# Patient Record
Sex: FEMALE | Race: WHITE | NOT HISPANIC OR LATINO | Employment: OTHER | URBAN - METROPOLITAN AREA
[De-identification: names, ages, dates, MRNs, and addresses within clinical notes are randomized per-mention and may not be internally consistent; named-entity substitution may affect disease eponyms.]

---

## 2017-01-04 ENCOUNTER — ANTI-COAG VISIT (OUTPATIENT)
Dept: CARDIOLOGY | Facility: CLINIC | Age: 82
End: 2017-01-04
Payer: MEDICARE

## 2017-01-04 DIAGNOSIS — I48.20 CHRONIC ATRIAL FIBRILLATION: ICD-10-CM

## 2017-01-04 DIAGNOSIS — Z79.01 LONG TERM (CURRENT) USE OF ANTICOAGULANTS: Primary | ICD-10-CM

## 2017-01-04 LAB
CTP QC/QA: NORMAL
INR PPP: 2.3 (ref 2–3)

## 2017-01-04 PROCEDURE — 85610 PROTHROMBIN TIME: CPT | Mod: QW,S$GLB,, | Performed by: PHARMACIST

## 2017-01-04 NOTE — PROGRESS NOTES
Patient denies any changes in diet, medications, or health that would effect warfarin therapy.  Patient was re-educated on situations that would require placing a call to the coumadin clinic, including bleeding or unusual bruising issues, changes in health, diet or medications, upcoming procedures that require warfarin interruption, and missed coumadin dose(s). Patient expressed understanding.

## 2017-01-05 ENCOUNTER — CLINICAL SUPPORT (OUTPATIENT)
Dept: INTERNAL MEDICINE | Facility: CLINIC | Age: 82
End: 2017-01-05
Payer: MEDICARE

## 2017-01-05 DIAGNOSIS — J30.81 NON-SEASONAL ALLERGIC RHINITIS DUE TO ANIMAL HAIR AND DANDER: ICD-10-CM

## 2017-01-05 DIAGNOSIS — J30.1 SEASONAL ALLERGIC RHINITIS DUE TO POLLEN: ICD-10-CM

## 2017-01-05 PROCEDURE — 95115 IMMUNOTHERAPY ONE INJECTION: CPT | Mod: S$GLB,,, | Performed by: INTERNAL MEDICINE

## 2017-01-05 NOTE — PROGRESS NOTES
Patient came in for her monthly allergy injection. 0.5ml of vial #1, RED, given to the upper right posterior arm.  Patient tolerated injection well with no reaction noted or verbalized.

## 2017-01-24 ENCOUNTER — OFFICE VISIT (OUTPATIENT)
Dept: INTERNAL MEDICINE | Facility: CLINIC | Age: 82
End: 2017-01-24
Payer: MEDICARE

## 2017-01-24 ENCOUNTER — TELEPHONE (OUTPATIENT)
Dept: INTERNAL MEDICINE | Facility: CLINIC | Age: 82
End: 2017-01-24

## 2017-01-24 ENCOUNTER — HOSPITAL ENCOUNTER (OUTPATIENT)
Dept: RADIOLOGY | Facility: HOSPITAL | Age: 82
Discharge: HOME | End: 2017-01-24
Attending: INTERNAL MEDICINE
Payer: MEDICARE

## 2017-01-24 VITALS
TEMPERATURE: 99 F | DIASTOLIC BLOOD PRESSURE: 64 MMHG | SYSTOLIC BLOOD PRESSURE: 136 MMHG | BODY MASS INDEX: 33.02 KG/M2 | HEART RATE: 75 BPM | WEIGHT: 204.56 LBS | RESPIRATION RATE: 20 BRPM

## 2017-01-24 DIAGNOSIS — I70.0 ATHEROSCLEROSIS OF AORTA: ICD-10-CM

## 2017-01-24 DIAGNOSIS — R06.09 DYSPNEA ON EXERTION: Primary | ICD-10-CM

## 2017-01-24 DIAGNOSIS — I51.89 LEFT VENTRICULAR DIASTOLIC DYSFUNCTION WITH PRESERVED SYSTOLIC FUNCTION: ICD-10-CM

## 2017-01-24 DIAGNOSIS — R06.09 DYSPNEA ON EXERTION: ICD-10-CM

## 2017-01-24 DIAGNOSIS — I50.32 CHRONIC DIASTOLIC HEART FAILURE: ICD-10-CM

## 2017-01-24 DIAGNOSIS — I48.20 CHRONIC ATRIAL FIBRILLATION: ICD-10-CM

## 2017-01-24 PROCEDURE — 3078F DIAST BP <80 MM HG: CPT | Mod: S$GLB,,, | Performed by: INTERNAL MEDICINE

## 2017-01-24 PROCEDURE — 3075F SYST BP GE 130 - 139MM HG: CPT | Mod: S$GLB,,, | Performed by: INTERNAL MEDICINE

## 2017-01-24 PROCEDURE — 71020 XR CHEST PA AND LATERAL: CPT | Mod: 26,,, | Performed by: RADIOLOGY

## 2017-01-24 PROCEDURE — 99214 OFFICE O/P EST MOD 30 MIN: CPT | Mod: S$GLB,,, | Performed by: INTERNAL MEDICINE

## 2017-01-24 PROCEDURE — 99999 PR PBB SHADOW E&M-EST. PATIENT-LVL III: CPT | Mod: PBBFAC,,, | Performed by: INTERNAL MEDICINE

## 2017-01-24 PROCEDURE — 1157F ADVNC CARE PLAN IN RCRD: CPT | Mod: S$GLB,,, | Performed by: INTERNAL MEDICINE

## 2017-01-24 PROCEDURE — 93010 ELECTROCARDIOGRAM REPORT: CPT | Mod: S$GLB,,, | Performed by: INTERNAL MEDICINE

## 2017-01-24 PROCEDURE — 1160F RVW MEDS BY RX/DR IN RCRD: CPT | Mod: S$GLB,,, | Performed by: INTERNAL MEDICINE

## 2017-01-24 PROCEDURE — 99499 UNLISTED E&M SERVICE: CPT | Mod: S$GLB,,, | Performed by: INTERNAL MEDICINE

## 2017-01-24 PROCEDURE — 93005 ELECTROCARDIOGRAM TRACING: CPT | Mod: S$GLB,,, | Performed by: INTERNAL MEDICINE

## 2017-01-24 PROCEDURE — 71020 XR CHEST PA AND LATERAL: CPT | Mod: TC,PO

## 2017-01-24 PROCEDURE — 1159F MED LIST DOCD IN RCRD: CPT | Mod: S$GLB,,, | Performed by: INTERNAL MEDICINE

## 2017-01-24 NOTE — PROGRESS NOTES
Subjective:       Patient ID: Jenniffer Jimenez is a 84 y.o. female.    Chief Complaint: Cough; Chest Pain; and Shortness of Breath    Patient is a 84 y.o.female with atherosclerosis of aorta, chronic diastolic heart failure and chronic atrial fibrillation who presents today for cough and congestion.    From christmas to January she was eating salty foods and drinking two glasses of wine nightly. Since then, she has developed some shortness of breath ; mostly on exertion. She admits to a cough as well. The cough is productive with phlegm. No fever or chills. She complains of left sided chest pain; usually after eating. It lasts a few seconds and after belching it resolves.     Review of Systems   Constitutional: Negative for appetite change, chills, diaphoresis, fatigue and fever.   HENT: Negative for congestion, dental problem, ear discharge, ear pain, hearing loss, postnasal drip, sinus pressure and sore throat.    Eyes: Negative for discharge, redness and itching.   Respiratory: Positive for cough and shortness of breath. Negative for chest tightness and wheezing.    Cardiovascular: Negative for chest pain, palpitations and leg swelling.   Gastrointestinal: Negative for abdominal pain, constipation, diarrhea, nausea and vomiting.   Endocrine: Negative for cold intolerance and heat intolerance.   Genitourinary: Negative for difficulty urinating, frequency, hematuria and urgency.   Musculoskeletal: Negative for arthralgias, back pain, gait problem, myalgias and neck pain.   Skin: Negative for color change and rash.   Neurological: Negative for dizziness, syncope and headaches.   Hematological: Negative for adenopathy.   Psychiatric/Behavioral: Negative for behavioral problems and sleep disturbance. The patient is not nervous/anxious.        Objective:      Physical Exam   Constitutional: She is oriented to person, place, and time. She appears well-developed and well-nourished. No distress.   HENT:   Head:  Normocephalic and atraumatic.   Right Ear: Tympanic membrane and external ear normal.   Left Ear: Tympanic membrane and external ear normal.   Nose: Mucosal edema and rhinorrhea present.   Mouth/Throat: Uvula is midline, oropharynx is clear and moist and mucous membranes are normal. No oropharyngeal exudate, posterior oropharyngeal edema, posterior oropharyngeal erythema or tonsillar abscesses.   Eyes: Conjunctivae and EOM are normal. Pupils are equal, round, and reactive to light. Right eye exhibits no discharge. Left eye exhibits no discharge. No scleral icterus.   Neck: Normal range of motion. Neck supple. No JVD present. No thyromegaly present.   Cardiovascular: Normal rate, regular rhythm, normal heart sounds and intact distal pulses.  Exam reveals no gallop and no friction rub.    No murmur heard.  Pulmonary/Chest: Effort normal and breath sounds normal. No stridor. No respiratory distress. She has no wheezes. She has no rales. She exhibits no tenderness.   Abdominal: Soft. Bowel sounds are normal. She exhibits no distension. There is no tenderness. There is no rebound.   Musculoskeletal: Normal range of motion. She exhibits no edema or tenderness.   Lymphadenopathy:     She has no cervical adenopathy.   2+ pitting edema bilateral lower extremities   Neurological: She is alert and oriented to person, place, and time.   Skin: Skin is warm. No rash noted. She is not diaphoretic. No erythema.   Psychiatric: She has a normal mood and affect. Her behavior is normal.   Nursing note and vitals reviewed.      Assessment and Plan:       1. Dyspnea on exertion  - rule out CHF exacerbation vs pneumonia vs cardiac etiology  - EKG 12-lead  - X-Ray Chest PA And Lateral; Future  - CBC auto differential; Future  - Brain natriuretic peptide; Future  - Basic metabolic panel; Future    2. Atherosclerosis of aorta  - stable on coumadin and statin    3. Chronic atrial fibrillation  - stable on coumadin and lasix    4. Chronic  diastolic heart failure  - taking lasix daily; check xray today    5. Left ventricular diastolic dysfunction with preserved systolic function  - taking lasix daily; check chest xray today    Notify clinic if symptoms change, worsen or do not improve        Return in about 4 months (around 5/24/2017).

## 2017-01-25 ENCOUNTER — ANTI-COAG VISIT (OUTPATIENT)
Dept: CARDIOLOGY | Facility: CLINIC | Age: 82
End: 2017-01-25
Payer: MEDICARE

## 2017-01-25 DIAGNOSIS — I48.20 CHRONIC ATRIAL FIBRILLATION: ICD-10-CM

## 2017-01-25 DIAGNOSIS — Z79.01 LONG TERM (CURRENT) USE OF ANTICOAGULANTS: Primary | ICD-10-CM

## 2017-01-25 LAB
CTP QC/QA: YES
INR PPP: 2 (ref 2–3)

## 2017-01-25 PROCEDURE — 85610 PROTHROMBIN TIME: CPT | Mod: QW,S$GLB,,

## 2017-01-25 PROCEDURE — 99211 OFF/OP EST MAY X REQ PHY/QHP: CPT | Mod: 25,S$GLB,,

## 2017-01-25 NOTE — PROGRESS NOTES
Patient missed a dose this past week since she accidentally dropped it on the floor. Despite this missed dose her INR remained within range. She will continue this dose until follow-up. I advised her to contact us with any changes or problems.

## 2017-02-01 ENCOUNTER — TELEPHONE (OUTPATIENT)
Dept: INTERNAL MEDICINE | Facility: CLINIC | Age: 82
End: 2017-02-01

## 2017-02-01 NOTE — TELEPHONE ENCOUNTER
----- Message from Vania Cesar sent at 2/1/2017  9:10 AM CST -----  Contact: self   Pt will like a copy of her EKG and lab results. Like come on Thursday to pickup copy       Please advise

## 2017-02-02 ENCOUNTER — CLINICAL SUPPORT (OUTPATIENT)
Dept: INTERNAL MEDICINE | Facility: CLINIC | Age: 82
End: 2017-02-02
Payer: MEDICARE

## 2017-02-02 DIAGNOSIS — J30.81 NON-SEASONAL ALLERGIC RHINITIS DUE TO ANIMAL HAIR AND DANDER: ICD-10-CM

## 2017-02-02 DIAGNOSIS — J30.1 SEASONAL ALLERGIC RHINITIS DUE TO POLLEN: ICD-10-CM

## 2017-02-02 PROCEDURE — 95115 IMMUNOTHERAPY ONE INJECTION: CPT | Mod: S$GLB,,, | Performed by: INTERNAL MEDICINE

## 2017-02-02 NOTE — PROGRESS NOTES
Patient came in for her monthly allergy injection. 0.5ml of vial #1, RED, given to the upper left posterior arm.  Patient had a 1+ local reaction noted to injection site.

## 2017-02-16 ENCOUNTER — PATIENT MESSAGE (OUTPATIENT)
Dept: CARDIOLOGY | Facility: CLINIC | Age: 82
End: 2017-02-16

## 2017-02-22 ENCOUNTER — ANTI-COAG VISIT (OUTPATIENT)
Dept: CARDIOLOGY | Facility: CLINIC | Age: 82
End: 2017-02-22
Payer: MEDICARE

## 2017-02-22 DIAGNOSIS — Z79.01 LONG TERM (CURRENT) USE OF ANTICOAGULANTS: Primary | ICD-10-CM

## 2017-02-22 DIAGNOSIS — I48.20 CHRONIC ATRIAL FIBRILLATION: ICD-10-CM

## 2017-02-22 LAB — INR PPP: 2.2 (ref 2–3)

## 2017-02-22 PROCEDURE — 99211 OFF/OP EST MAY X REQ PHY/QHP: CPT | Mod: 25,S$GLB,,

## 2017-02-22 PROCEDURE — 85610 PROTHROMBIN TIME: CPT | Mod: QW,S$GLB,,

## 2017-02-22 NOTE — PROGRESS NOTES
Patient will stop salads every other day and supplement the salads for [employer] nutritional drink. This change in diet can affect warfarin. She will begin in one week therefore we will follow-up two weeks later to ensure she is meeting her target goal with this change in diet. I advised her to contact us with any changes or problems.

## 2017-03-02 ENCOUNTER — CLINICAL SUPPORT (OUTPATIENT)
Dept: INTERNAL MEDICINE | Facility: CLINIC | Age: 82
End: 2017-03-02
Payer: MEDICARE

## 2017-03-02 DIAGNOSIS — J30.1 SEASONAL ALLERGIC RHINITIS DUE TO POLLEN: ICD-10-CM

## 2017-03-02 DIAGNOSIS — J30.81 NON-SEASONAL ALLERGIC RHINITIS DUE TO ANIMAL HAIR AND DANDER: ICD-10-CM

## 2017-03-02 PROCEDURE — 95115 IMMUNOTHERAPY ONE INJECTION: CPT | Mod: S$GLB,,, | Performed by: INTERNAL MEDICINE

## 2017-03-02 NOTE — PROGRESS NOTES
Patient came in for her monthly allergy injection. 0.5ml of vial #1 given to the upper left posterior arm.  Patient had a 1+ local reaction noted to injection site.

## 2017-03-06 ENCOUNTER — OFFICE VISIT (OUTPATIENT)
Dept: OTOLARYNGOLOGY | Facility: CLINIC | Age: 82
End: 2017-03-06
Payer: MEDICARE

## 2017-03-06 ENCOUNTER — CLINICAL SUPPORT (OUTPATIENT)
Dept: AUDIOLOGY | Facility: CLINIC | Age: 82
End: 2017-03-06
Payer: MEDICARE

## 2017-03-06 VITALS — WEIGHT: 205 LBS | BODY MASS INDEX: 32.95 KG/M2 | HEIGHT: 66 IN

## 2017-03-06 DIAGNOSIS — H90.3 SENSORY HEARING LOSS, BILATERAL: Primary | ICD-10-CM

## 2017-03-06 DIAGNOSIS — H60.8X1 CHRONIC ECZEMATOUS OTITIS EXTERNA OF RIGHT EAR: ICD-10-CM

## 2017-03-06 PROCEDURE — 99999 PR PBB SHADOW E&M-EST. PATIENT-LVL III: CPT | Mod: PBBFAC,,, | Performed by: OTOLARYNGOLOGY

## 2017-03-06 PROCEDURE — 92567 TYMPANOMETRY: CPT | Mod: S$GLB,,, | Performed by: AUDIOLOGIST

## 2017-03-06 PROCEDURE — 1159F MED LIST DOCD IN RCRD: CPT | Mod: S$GLB,,, | Performed by: OTOLARYNGOLOGY

## 2017-03-06 PROCEDURE — 1157F ADVNC CARE PLAN IN RCRD: CPT | Mod: S$GLB,,, | Performed by: OTOLARYNGOLOGY

## 2017-03-06 PROCEDURE — 99213 OFFICE O/P EST LOW 20 MIN: CPT | Mod: S$GLB,,, | Performed by: OTOLARYNGOLOGY

## 2017-03-06 PROCEDURE — 1160F RVW MEDS BY RX/DR IN RCRD: CPT | Mod: S$GLB,,, | Performed by: OTOLARYNGOLOGY

## 2017-03-06 PROCEDURE — 92557 COMPREHENSIVE HEARING TEST: CPT | Mod: S$GLB,,, | Performed by: AUDIOLOGIST

## 2017-03-06 PROCEDURE — 1126F AMNT PAIN NOTED NONE PRSNT: CPT | Mod: S$GLB,,, | Performed by: OTOLARYNGOLOGY

## 2017-03-06 RX ORDER — BETAMETHASONE VALERATE 0.1 %
LOTION (ML) TOPICAL 2 TIMES DAILY
Qty: 60 ML | Refills: 2 | Status: SHIPPED | OUTPATIENT
Start: 2017-03-06 | End: 2018-04-06

## 2017-03-06 NOTE — PROGRESS NOTES
Subjective:       Patient ID: Jenniffer Jimenez is a 85 y.o. female.    Chief Complaint: itchy ears    HPI: Hx of MORRO SMITH.    S/P EMS .    Now with R ear itching.    Past Medical History: Patient has a past medical history of Amblyopia of left eye (4/10/2013); Anxiety; Arthritis; Central retinal vein occlusion of left eye; Depression; Diastolic heart failure (2014); Exotropia of both eyes (2013); History of resection of small bowel; Hyperlipidemia; Hypertension; Hypertensive retinopathy of both eyes; Hypoglycemia; Macular degeneration; Meniere's disease of both ears; Other and unspecified hyperlipidemia; and Posterior vitreous detachment of both eyes.    Past Surgical History: Patient has a past surgical history that includes Cardiac catheterization; Inner ear surgery; Sinus surgery; Tonsillectomy;  section, classic; Appendectomy; Strabismus surgery (13); Strabismus surgery (2014); and Cataract extraction w/  intraocular lens implant (Bilateral).    Social History: Patient reports that she quit smoking about 34 years ago. Her smoking use included Cigarettes. She has quit using smokeless tobacco. She reports that she drinks alcohol. She reports that she does not use illicit drugs.    Family History: family history includes Diabetes in her brother and sister; Heart disease in her sister and sister; Hypertension in her father and mother; Liver disease in her sister; No Known Problems in her daughter, maternal aunt, maternal grandfather, maternal grandmother, maternal uncle, paternal aunt, paternal grandfather, paternal grandmother, paternal uncle, son, son, and son. There is no history of Cancer, Melanoma, Psoriasis, Lupus, Amblyopia, Blindness, Cataracts, Glaucoma, Macular degeneration, Retinal detachment, Strabismus, Stroke, or Thyroid disease.    Medications:   Current Outpatient Prescriptions   Medication Sig    aspirin (ECOTRIN) 81 MG EC tablet Take 81 mg by mouth once daily.     clindamycin (CLEOCIN T) 1 % lotion Use hs on face    fluticasone (FLONASE) 50 mcg/actuation nasal spray 1 spray by Each Nare route once daily.    furosemide (LASIX) 20 MG tablet Take 1 tablet (20 mg total) by mouth once daily.    glucosamine-chondroitin 500-400 mg tablet Take 1 tablet by mouth every morning.    peg 400-propylene glycol (SYSTANE) 0.4-0.3 % Drop Apply to eye daily as needed.     potassium chloride (MICRO-K) 10 MEQ CpSR Take 1 capsule (10 mEq total) by mouth once daily.    pravastatin (PRAVACHOL) 40 MG tablet Take 1 tablet (40 mg total) by mouth 2 (two) times daily. (Patient taking differently: Take 40 mg by mouth once daily. )    vitamin D 1000 units Tab Take 2,000 Units by mouth once daily.     warfarin (COUMADIN) 3 MG tablet Take 1 tablet (3 mg total) by mouth Daily.    betamethasone valerate 0.1% (VALISONE) 0.1 % Lotn Apply topically 2 (two) times daily. qtts 3 AD Bid.     No current facility-administered medications for this visit.        Allergies: Patient is allergic to bactrim [sulfamethoxazole-trimethoprim]; dramamine [dimenhydrinate]; tramadol; beta-blockers (beta-adrenergic blocking agts); and phenergan [promethazine].      Review of Systems   Constitutional: Negative.    HENT: Positive for ear discharge, ear pain and hearing loss. Negative for tinnitus.    Neurological: Negative for dizziness, seizures, syncope, facial asymmetry, speech difficulty, weakness, light-headedness and headaches.       Objective:      Physical Exam   Constitutional: She appears well-developed and well-nourished. No distress.   HENT:   Right Ear: External ear normal. No lacerations. No drainage, swelling or tenderness. No foreign bodies. No mastoid tenderness. Tympanic membrane is not injected, not scarred, not perforated, not erythematous, not retracted and not bulging. Tympanic membrane mobility is normal. No middle ear effusion. No hemotympanum. Decreased hearing is noted.   Left Ear: External ear  normal. No lacerations. No drainage, swelling or tenderness. No foreign bodies. No mastoid tenderness. Tympanic membrane is not injected, not scarred, not perforated, not erythematous, not retracted and not bulging. Tympanic membrane mobility is normal.  No middle ear effusion. No hemotympanum. Decreased hearing is noted.       R LISSY.       Eyes: Right eye exhibits normal extraocular motion and no nystagmus. Left eye exhibits normal extraocular motion and no nystagmus.   Neurological: She has normal strength. No cranial nerve deficit or sensory deficit. She displays a negative Romberg sign. Coordination and gait normal.   Skin: She is not diaphoretic.               Assessment:       1. Asymmetrical hearing loss, bilateral    2. Chronic eczematous otitis externa of right ear        Plan:         Jenniffer was seen today for itchy ears.    Diagnoses and all orders for this visit:    Asymmetrical hearing loss, bilateral  -     Ambulatory referral to Audiology    Chronic eczematous otitis externa of right ear  -     Ambulatory referral to Audiology    Other orders  -     betamethasone valerate 0.1% (VALISONE) 0.1 % Lotn; Apply topically 2 (two) times daily. qtts 3 AD Bid.        Patient to see Audiology for hearing aid evaluation.

## 2017-03-06 NOTE — MR AVS SNAPSHOT
[facility]  [address]  [place] [address]  Phone: [phone]  Fax: [phone]                  Jenniffer Jimenez   3/6/2017 2:00 PM   Office Visit    Description:  Female : 1932   Provider:  Celestino Lehman MD   Department:  [facility]           Reason for Visit     itchy ears           Diagnoses this Visit        Comments    Asymmetrical hearing loss, bilateral    -  Primary     Chronic eczematous otitis externa of right ear                To Do List           Future Appointments        Provider Department Dept Phone    3/15/2017 1:15 PM Denisa Camacho, PharmD [facility] [phone]    2017 8:30 AM Luly Saenz MD [facility] [phone]      Goals (5 Years of Data)     None      Follow-Up and Disposition     Return if symptoms worsen or fail to improve.       These Medications        Disp Refills Start End    betamethasone valerate 0.1% (VALISONE) 0.1 % Lotn 60 mL 2 3/6/2017 3/20/2017    Apply topically 2 (two) times daily. qtts 3 AD Bid. - Topical    [facility] [id number]  [place] [address] AT [facility] OF [facility] [address] Ph #: [phone]         [facility] On Call     [facility] On Call Nurse Care Line - 24/7 Assistance  Registered nurses in the [facility] On Call Center provide clinical advisement, health education, appointment booking, and other advisory services.  Call for this free service at [phone].             Medications           Message regarding Medications     Verify the changes and/or additions to your medication regime listed below are the same as discussed with your clinician today.  If any of these changes or additions are incorrect, please notify your healthcare provider.        START taking these NEW medications        Refills    betamethasone valerate 0.1% (VALISONE) 0.1 % Lotn 2    Sig: Apply topically 2 (two) times daily. qtts 3 AD Bid.    Class:  "Normal    Route: Topical           Verify that the below list of medications is an accurate representation of the medications you are currently taking.  If none reported, the list may be blank. If incorrect, please contact your healthcare provider. Carry this list with you in case of emergency.           Current Medications     aspirin (ECOTRIN) 81 MG EC tablet Take 81 mg by mouth once daily.    betamethasone valerate 0.1% (VALISONE) 0.1 % Lotn Apply topically 2 (two) times daily. qtts 3 AD Bid.    clindamycin (CLEOCIN T) 1 % lotion Use hs on face    fluticasone (FLONASE) 50 mcg/actuation nasal spray 1 spray by Each Nare route once daily.    furosemide (LASIX) 20 MG tablet Take 1 tablet (20 mg total) by mouth once daily.    glucosamine-chondroitin 500-400 mg tablet Take 1 tablet by mouth every morning.    peg 400-propylene glycol (SYSTANE) 0.4-0.3 % Drop Apply to eye daily as needed.     potassium chloride (MICRO-K) 10 MEQ CpSR Take 1 capsule (10 mEq total) by mouth once daily.    pravastatin (PRAVACHOL) 40 MG tablet Take 1 tablet (40 mg total) by mouth 2 (two) times daily.    vitamin D 1000 units Tab Take 2,000 Units by mouth once daily.     warfarin (COUMADIN) 3 MG tablet Take 1 tablet (3 mg total) by mouth Daily.           Clinical Reference Information           Your Vitals Were     Height Weight Last Period BMI       5' 6" (1.676 m) 93 kg (205 lb 0.4 oz) (LMP Unknown) 33.09 kg/m2       Allergies as of 3/6/2017     Bactrim [Sulfamethoxazole-trimethoprim]    Dramamine [Dimenhydrinate]    Tramadol    Beta-blockers (Beta-adrenergic Blocking Agts)    Phenergan [Promethazine]      Immunizations Administered on Date of Encounter - 3/6/2017     None      Orders Placed During Today's Visit      Normal Orders This Visit    Ambulatory referral to Audiology       Language Assistance Services     ATTENTION: Language assistance services are available, free of charge. Please call [phone].      ATENCIÓN: Si derrek " español, tiene a valle disposición servicios gratuitos de asistencia lingüística. Mauro al [phone].     TRACIE Ý: N?u b?n nói Ti?ng Vi?t, có các d?ch v? h? tr? ngôn ng? mi?n phí dành cho b?n. G?i s? [phone].         [facility] complies with applicable Federal civil rights laws and does not discriminate on the basis of race, color, national origin, age, disability, or sex.

## 2017-03-06 NOTE — PROGRESS NOTES
Moderate to severe sensorineural hearing loss with type A tympanogram in the right ear.  Severe sensorineural hearing loss in the left ear with type A tympanogram.

## 2017-03-10 ENCOUNTER — CLINICAL SUPPORT (OUTPATIENT)
Dept: AUDIOLOGY | Facility: CLINIC | Age: 82
End: 2017-03-10

## 2017-03-10 DIAGNOSIS — H90.3 ASYMMETRICAL SENSORINEURAL HEARING LOSS: Primary | ICD-10-CM

## 2017-03-10 PROCEDURE — 99499 UNLISTED E&M SERVICE: CPT | Mod: S$GLB,,, | Performed by: OTOLARYNGOLOGY

## 2017-03-10 NOTE — PROGRESS NOTES
Jenniffer Jimenez was seen in the clinic today for a hearing aid consult.    Pricing and styles were discussed. Ms. Jimenez was interested in the [employer] B90-R hearing aids in champagne. She stated she wanted to price shop and trial hearing aids for [employer] before deciding to order. Ms. Jimenez will call the clinic if she decides to order hearing aids.

## 2017-03-24 ENCOUNTER — ANTI-COAG VISIT (OUTPATIENT)
Dept: CARDIOLOGY | Facility: CLINIC | Age: 82
End: 2017-03-24
Payer: MEDICARE

## 2017-03-24 DIAGNOSIS — Z79.01 LONG TERM (CURRENT) USE OF ANTICOAGULANTS: Primary | ICD-10-CM

## 2017-03-24 DIAGNOSIS — I48.20 CHRONIC ATRIAL FIBRILLATION: ICD-10-CM

## 2017-03-24 LAB — INR PPP: 2.2 (ref 2–3)

## 2017-03-24 PROCEDURE — 85610 PROTHROMBIN TIME: CPT | Mod: QW,S$GLB,, | Performed by: PHARMACIST

## 2017-03-25 ENCOUNTER — NURSE TRIAGE (OUTPATIENT)
Dept: ADMINISTRATIVE | Facility: CLINIC | Age: 82
End: 2017-03-25

## 2017-03-25 ENCOUNTER — HOSPITAL ENCOUNTER (EMERGENCY)
Facility: HOSPITAL | Age: 82
Discharge: HOME | End: 2017-03-25
Attending: EMERGENCY MEDICINE | Admitting: EMERGENCY MEDICINE
Payer: MEDICARE

## 2017-03-25 VITALS
WEIGHT: 195 LBS | BODY MASS INDEX: 31.34 KG/M2 | HEIGHT: 66 IN | SYSTOLIC BLOOD PRESSURE: 117 MMHG | DIASTOLIC BLOOD PRESSURE: 55 MMHG | TEMPERATURE: 100 F | OXYGEN SATURATION: 96 % | RESPIRATION RATE: 18 BRPM | HEART RATE: 104 BPM

## 2017-03-25 DIAGNOSIS — R11.2 NON-INTRACTABLE VOMITING WITH NAUSEA, UNSPECIFIED VOMITING TYPE: ICD-10-CM

## 2017-03-25 DIAGNOSIS — R10.9 ABDOMINAL PAIN, UNSPECIFIED LOCATION: Primary | ICD-10-CM

## 2017-03-25 DIAGNOSIS — K52.9 GASTROENTERITIS: ICD-10-CM

## 2017-03-25 DIAGNOSIS — R19.7 DIARRHEA, UNSPECIFIED TYPE: ICD-10-CM

## 2017-03-25 DIAGNOSIS — R10.32 ABDOMINAL PAIN, LEFT LOWER QUADRANT: ICD-10-CM

## 2017-03-25 LAB
ALBUMIN SERPL BCP-MCNC: 3.7 G/DL
ALP SERPL-CCNC: 118 U/L
ALT SERPL W/O P-5'-P-CCNC: 13 U/L
ANION GAP SERPL CALC-SCNC: 10 MMOL/L
AST SERPL-CCNC: 18 U/L
BACTERIA #/AREA URNS AUTO: NORMAL /HPF
BASOPHILS # BLD AUTO: 0.01 K/UL
BASOPHILS NFR BLD: 0.1 %
BILIRUB SERPL-MCNC: 0.6 MG/DL
BILIRUB UR QL STRIP: NEGATIVE
BUN SERPL-MCNC: 30 MG/DL
CALCIUM SERPL-MCNC: 9.4 MG/DL
CHLORIDE SERPL-SCNC: 105 MMOL/L
CLARITY UR REFRACT.AUTO: ABNORMAL
CO2 SERPL-SCNC: 25 MMOL/L
COLOR UR AUTO: YELLOW
CREAT SERPL-MCNC: 1 MG/DL
DIFFERENTIAL METHOD: ABNORMAL
EOSINOPHIL # BLD AUTO: 0.1 K/UL
EOSINOPHIL NFR BLD: 0.9 %
ERYTHROCYTE [DISTWIDTH] IN BLOOD BY AUTOMATED COUNT: 14.7 %
EST. GFR  (AFRICAN AMERICAN): 59.4 ML/MIN/1.73 M^2
EST. GFR  (NON AFRICAN AMERICAN): 51.5 ML/MIN/1.73 M^2
GLUCOSE SERPL-MCNC: 118 MG/DL
GLUCOSE UR QL STRIP: NEGATIVE
HCT VFR BLD AUTO: 36.7 %
HGB BLD-MCNC: 13 G/DL
HGB UR QL STRIP: NEGATIVE
HYALINE CASTS UR QL AUTO: 0 /LPF
KETONES UR QL STRIP: NEGATIVE
LEUKOCYTE ESTERASE UR QL STRIP: ABNORMAL
LIPASE SERPL-CCNC: 12 U/L
LYMPHOCYTES # BLD AUTO: 0.4 K/UL
LYMPHOCYTES NFR BLD: 4.5 %
MCH RBC QN AUTO: 30.2 PG
MCHC RBC AUTO-ENTMCNC: 35.4 %
MCV RBC AUTO: 85 FL
MICROSCOPIC COMMENT: NORMAL
MONOCYTES # BLD AUTO: 0.2 K/UL
MONOCYTES NFR BLD: 2.1 %
NEUTROPHILS # BLD AUTO: 9 K/UL
NEUTROPHILS NFR BLD: 92.2 %
NITRITE UR QL STRIP: NEGATIVE
PH UR STRIP: 5 [PH] (ref 5–8)
PLATELET # BLD AUTO: 206 K/UL
PMV BLD AUTO: 9.5 FL
POTASSIUM SERPL-SCNC: 4.2 MMOL/L
PROT SERPL-MCNC: 7.2 G/DL
PROT UR QL STRIP: ABNORMAL
RBC # BLD AUTO: 4.3 M/UL
RBC #/AREA URNS AUTO: 0 /HPF (ref 0–4)
SODIUM SERPL-SCNC: 140 MMOL/L
SP GR UR STRIP: 1.03 (ref 1–1.03)
SQUAMOUS #/AREA URNS AUTO: 2 /HPF
URN SPEC COLLECT METH UR: ABNORMAL
UROBILINOGEN UR STRIP-ACNC: NEGATIVE EU/DL
WBC # BLD AUTO: 9.81 K/UL
WBC #/AREA URNS AUTO: 4 /HPF (ref 0–5)

## 2017-03-25 PROCEDURE — 81001 URINALYSIS AUTO W/SCOPE: CPT

## 2017-03-25 PROCEDURE — 63600175 PHARM REV CODE 636 W HCPCS: Performed by: PHYSICIAN ASSISTANT

## 2017-03-25 PROCEDURE — 25500020 PHARM REV CODE 255: Performed by: EMERGENCY MEDICINE

## 2017-03-25 PROCEDURE — 96374 THER/PROPH/DIAG INJ IV PUSH: CPT

## 2017-03-25 PROCEDURE — 25000003 PHARM REV CODE 250: Performed by: PHYSICIAN ASSISTANT

## 2017-03-25 PROCEDURE — 99284 EMERGENCY DEPT VISIT MOD MDM: CPT | Mod: 25

## 2017-03-25 PROCEDURE — 96361 HYDRATE IV INFUSION ADD-ON: CPT

## 2017-03-25 PROCEDURE — 83690 ASSAY OF LIPASE: CPT

## 2017-03-25 PROCEDURE — 80053 COMPREHEN METABOLIC PANEL: CPT

## 2017-03-25 PROCEDURE — 99284 EMERGENCY DEPT VISIT MOD MDM: CPT | Mod: ,,, | Performed by: PHYSICIAN ASSISTANT

## 2017-03-25 PROCEDURE — 85025 COMPLETE CBC W/AUTO DIFF WBC: CPT

## 2017-03-25 RX ORDER — HYOSCYAMINE SULFATE 0.12 MG/1
0.12 TABLET SUBLINGUAL
Status: COMPLETED | OUTPATIENT
Start: 2017-03-25 | End: 2017-03-25

## 2017-03-25 RX ORDER — ONDANSETRON 4 MG/1
4 TABLET, ORALLY DISINTEGRATING ORAL EVERY 8 HOURS PRN
Qty: 12 TABLET | Refills: 0 | Status: SHIPPED | OUTPATIENT
Start: 2017-03-25 | End: 2017-03-31

## 2017-03-25 RX ORDER — ONDANSETRON 2 MG/ML
4 INJECTION INTRAMUSCULAR; INTRAVENOUS
Status: COMPLETED | OUTPATIENT
Start: 2017-03-25 | End: 2017-03-25

## 2017-03-25 RX ORDER — HYOSCYAMINE SULFATE 0.12 MG/1
0.12 TABLET SUBLINGUAL EVERY 4 HOURS PRN
Qty: 15 TABLET | Refills: 0 | Status: SHIPPED | OUTPATIENT
Start: 2017-03-25 | End: 2017-03-31

## 2017-03-25 RX ADMIN — ONDANSETRON 4 MG: 2 INJECTION INTRAMUSCULAR; INTRAVENOUS at 09:03

## 2017-03-25 RX ADMIN — SODIUM CHLORIDE 500 ML: 0.9 INJECTION, SOLUTION INTRAVENOUS at 01:03

## 2017-03-25 RX ADMIN — IOHEXOL 75 ML: 350 INJECTION, SOLUTION INTRAVENOUS at 11:03

## 2017-03-25 RX ADMIN — HYOSCYAMINE SULFATE 0.12 MG: 0.12 TABLET ORAL at 09:03

## 2017-03-25 RX ADMIN — SODIUM CHLORIDE 500 ML: 0.9 INJECTION, SOLUTION INTRAVENOUS at 09:03

## 2017-03-25 NOTE — ED AVS SNAPSHOT
[facility]  [address]  [facility] [address]               Jenniffer Jimenez   3/25/2017  8:03 AM   ED    Description:  Female : 1932   Department:  [facility]           Your Care was Coordinated By:     Provider Role From To    Remi Stein MD Attending Provider 17 --    Toya Wang PA-C Physician Assistant 17 --      Reason for Visit     Abdominal Pain     Diarrhea           Diagnoses this Visit        Comments    Abdominal pain, unspecified location    -  Primary     Non-intractable vomiting with nausea, unspecified vomiting type         Diarrhea, unspecified type         Gastroenteritis           ED Disposition     ED Disposition Condition Comment    Discharge             To Do List           Follow-up Information     Schedule an appointment as soon as possible for a visit with Rubi Young DO.    Specialty:  Internal Medicine    Contact information:     [facility] [address]  [phone]         These Medications        Disp Refills Start End    hyoscyamine (LEVSIN/SL) 0.125 mg Subl 15 tablet 0 3/25/2017     Place 1 tablet (0.125 mg total) under the tongue every 4 (four) hours as needed. - Sublingual    Pharmacy: [facility] - [address] Ph #: [phone]       ondansetron (ZOFRAN-ODT) 4 MG TbDL 12 tablet 0 3/25/2017     Take 1 tablet (4 mg total) by mouth every 8 (eight) hours as needed. - Oral    Pharmacy: [facility] - [address] Ph #: [phone]         [facility]     [facility] -  Assistance  Registered nurses in the [facility] provide clinical advisement, health education, appointment booking, and other advisory services.  Call for this free service at [phone].             Medications           Message regarding Medications      Verify the changes and/or additions to your medication regime listed below are the same as discussed with your clinician today.  If any of these changes or additions are incorrect, please notify your healthcare provider.        START taking these NEW medications        Refills    hyoscyamine (LEVSIN/SL) 0.125 mg Subl 0    Sig: Place 1 tablet (0.125 mg total) under the tongue every 4 (four) hours as needed.    Class: Print    Route: Sublingual    ondansetron (ZOFRAN-ODT) 4 MG TbDL 0    Sig: Take 1 tablet (4 mg total) by mouth every 8 (eight) hours as needed.    Class: Print    Route: Oral      These medications were administered today        Dose Freq    ondansetron injection 4 mg 4 mg ED 1 Time    Sig: Inject 4 mg into the vein ED 1 Time.    Class: Normal    Route: Intravenous    Cosign for Ordering: Accepted by Remi Stein MD on 3/25/2017 10:22 AM    sodium chloride 0.9% bolus 500 mL 500 mL ED 1 Time    Sig: Inject 500 mLs into the vein ED 1 Time.    Class: Normal    Route: Intravenous    Cosign for Ordering: Accepted by Remi Stein MD on 3/25/2017 10:22 AM    hyoscyamine SL tablet 0.125 mg 0.125 mg ED 1 Time    Sig: Take 1 tablet (0.125 mg total) by mouth ED 1 Time.    Class: Normal    Route: Oral    Cosign for Ordering: Accepted by Remi Stein MD on 3/25/2017 10:22 AM    omnipaque 350 iohexol 75 mL 75 mL IMG once as needed    Sig: Inject 75 mLs into the vein ONCE PRN for contrast.    Class: Normal    Route: Intravenous    sodium chloride 0.9% bolus 500 mL 500 mL ED 1 Time    Sig: Inject 500 mLs into the vein ED 1 Time.    Class: Normal    Route: Intravenous    Cosign for Ordering: Required by Remi Stein MD           Verify that the below list of medications is an accurate representation of the medications you are currently taking.  If none reported, the list may be blank. If incorrect, please contact your healthcare provider. Carry this list with you in case of  "emergency.           Current Medications     aspirin (ECOTRIN) 81 MG EC tablet Take 81 mg by mouth once daily.    clindamycin (CLEOCIN T) 1 % lotion Use hs on face    fluticasone (FLONASE) 50 mcg/actuation nasal spray 1 spray by Each Nare route once daily.    furosemide (LASIX) 20 MG tablet Take 1 tablet (20 mg total) by mouth once daily.    glucosamine-chondroitin 500-400 mg tablet Take 1 tablet by mouth every morning.    peg 400-propylene glycol (SYSTANE) 0.4-0.3 % Drop Apply to eye daily as needed.     potassium chloride (MICRO-K) 10 MEQ CpSR Take 1 capsule (10 mEq total) by mouth once daily.    pravastatin (PRAVACHOL) 40 MG tablet Take 1 tablet (40 mg total) by mouth 2 (two) times daily.    vitamin D 1000 units Tab Take 2,000 Units by mouth once daily.     warfarin (COUMADIN) 3 MG tablet Take 1 tablet (3 mg total) by mouth Daily.    betamethasone valerate 0.1% (VALISONE) 0.1 % Lotn Apply topically 2 (two) times daily. qtts 3 AD Bid.    hyoscyamine (LEVSIN/SL) 0.125 mg Subl Place 1 tablet (0.125 mg total) under the tongue every 4 (four) hours as needed.    hyoscyamine SL tablet 0.125 mg Take 1 tablet (0.125 mg total) by mouth ED 1 Time.    ondansetron (ZOFRAN-ODT) 4 MG TbDL Take 1 tablet (4 mg total) by mouth every 8 (eight) hours as needed.           Clinical Reference Information           Your Vitals Were     BP Pulse Temp Resp Height Weight    117/55 (BP Location: Right arm, Patient Position: Lying, BP Method: Automatic) 104 99.6 °F (37.6 °C) (Oral) 18 5' 6" (1.676 m) 88.5 kg (195 lb)    Last Period SpO2 BMI          (LMP Unknown) 96% 31.47 kg/m2        Allergies as of 3/25/2017        Reactions    Bactrim [Sulfamethoxazole-trimethoprim]     arthritis    Dramamine [Dimenhydrinate]     Tramadol Other (See Comments)    hallucinations    Beta-blockers (Beta-adrenergic Blocking Agts)     Can not go on beta blockers for long period of time    Phenergan [Promethazine]     per pt. request- saw sisters have bad " reaction to drug      Immunizations Administered on Date of Encounter - 3/25/2017     None      ED Micro, Lab, POCT     Start Ordered       Status Ordering Provider    03/25/17 0840 03/25/17 0840  Urinalysis Microscopic  Once      Final result     03/25/17 0839 03/25/17 0840  CBC auto differential  STAT      Final result     03/25/17 0839 03/25/17 0840  Urinalysis Clean Catch  STAT      Final result     03/25/17 0839 03/25/17 0840  Comprehensive metabolic panel  STAT      Final result     03/25/17 0839 03/25/17 0840  Lipase  STAT      Final result       ED Imaging Orders     Start Ordered       Status Ordering Provider    03/25/17 0840 03/25/17 0840  CT Abdomen Pelvis With Contrast  1 time imaging      Final result         Discharge Instructions       Follow up with your PCP. Take the nausea medication (zofran) every 8 hours. Take levsin as needed for abdominal cramping. Return to the ED for any new or worsening symptoms, including those listed below.        Abdominal Pain    Abdominal pain is pain in the stomach or belly area. Everyone has this pain from time to time. In many cases it goes away on its own. But abdominal pain can sometimes be due to a serious problem, such as appendicitis. So its important to know when to seek help.  Causes of abdominal pain  There are many possible causes of abdominal pain. Common causes in adults include:  · Constipation, diarrhea, or gas  · Stomach acid flowing back up into the esophagus (acid reflux or heartburn)  · Severe acid reflux, called GERD (gastroesophageal reflux disease)  · A sore in the lining of the stomach or small intestine (peptic ulcer)  · Inflammation of the gallbladder, liver, or pancreas  · Gallstones or kidney stones  · Appendicitis   · Intestinal blockage   · An internal organ pushing through a muscle or other tissue (hernia)  · Urinary tract infections  · In women, menstrual cramps, fibroids, or endometriosis  · Inflammation or infection of the  intestines  Diagnosing the cause of abdominal pain  Your healthcare provider will do a physical exam help find the cause of your pain. If needed, tests will be ordered. Belly pain has many possible causes. So it can be hard to find the reason for your pain. Giving details about your pain can help. Tell your provider where and when you feel the pain, and what makes it better or worse. Also let your provider know if you have other symptoms such as:  · Fever  · Tiredness  · Upset stomach (nausea)  · Vomiting  · Changes in bathroom habits  Treating abdominal pain  Some causes of pain need emergency medical treatment right away. These include appendicitis or a bowel blockage. Other problems can be treated with rest, fluids, or medicines. Your healthcare provider can give you specific instructions for treatment or self-care based on what is causing your pain.  If you have vomiting or diarrhea, sip water or other clear fluids. When you are ready to eat solid foods again, start with small amounts of easy-to-digest, low-fat foods. These include apple sauce, toast, or crackers.   When to seek medical care  Call 911 or go to the hospital right away if you:  · Cant pass stool and are vomiting  · Are vomiting blood or have bloody diarrhea or black, tarry diarrhea  · Have chest, neck, or shoulder pain  · Feel like you might pass out  · Have pain in your shoulder blades with nausea  · Have sudden, severe belly pain  · Have new, severe pain unlike any you have felt before  · Have a belly that is rigid, hard, and tender to touch  Call your healthcare provider if you have:  · Pain for more than 5 days  · Bloating for more than 2 days  · Diarrhea for more than 5 days  · A fever of 100.4°F (38.0°C) or higher, or as directed by your provider  · Pain that gets worse  · Weight loss for no reason  · Continued lack of appetite  · Blood in your stool  How to prevent abdominal pain  Here are some tips to help prevent abdominal pain:  · Eat  smaller amounts of food at one time.  · Avoid greasy, fried, or other high-fat foods.  · Avoid foods that give you gas.  · Exercise regularly.  · Drink plenty of fluids.  To help prevent GERD symptoms:  · Quit smoking.  · Reduce alcohol and certain foods that increase stomach acid.  · Avoid aspirin and over-the-counter pain and fever medicines (NSAIDS or nonsteroidal anti-inflammatory drugs), if possible  · Lose extra weight.  · Finish eating at least 2 hours before you go to bed or lie down.  · Raise the head of your bed.  Date Last Reviewed: 7/1/2016  © 5238-2801 [employer]. [address] [place] [address], [place] [address]. All rights reserved. This information is not intended as a substitute for professional medical care. Always follow your healthcare professional's instructions.          Noninfectious Gastroenteritis (Ages 6 Years to Adult)    Gastroenteritis can cause nausea, vomiting, diarrhea, and abdominal cramping. This may occur as a result of food sensitivity, inflammation of your gastrointestinal tract, medicines, stress, or other causes not related to infection. Your symptoms will usually last from 1 to 3 days, but can last longer. Antibiotics are not effective, but simple home treatment will be helpful.  Home care  Medicine  · You may use acetaminophen or NSAID medicines like ibuprofen or naproxen to control fever, unless another medicine is prescribed. (Note: If you have chronic liver or kidney disease, or ever had a stomach ulcer or gastrointestinalI bleeding, talk with your healthcare provider before using these medicines.) Aspirin should never be used in anyone under 18 years of age who is ill with a fever. It may cause severe liver damage. Don't increase your NSAID medicines if you are already taking these medicines for another condition (like arthritis). Don't use NSAIDS if you are on aspirin (such as for heart disease, or after a stroke).  · If medicines for diarrhea or vomiting are  prescribed, take only as directed.  General care and preventing spread of the illness  · If symptoms are severe, rest at home for the next 24 hours or until you feel better.  · Hand washing with soap and water is the best way to prevent the spread of infection. Wash your hands after touching anyone who is sick.  · Wash your hands after using the toilet and before meals. Clean the toilet after each use.  · Caffeine, tobacco, and alcohol can make your diarrhea, cramping, and pain worse.  Diet  · Water and clear liquids are important so you do not get dehydrated. Drink a small amount at a time.  · Do not force yourself to eat, especially if you have cramps, vomiting, or diarrhea. When you finally decide to start eating, do not eat large amounts at a time, even if you are hungry.  · If you eat, avoid fatty, greasy, spicy, or fried foods.  · Do not eat dairy products if you have diarrhea; they can make the diarrhea worse.  During the first 24 hours (the first full day), follow the diet below:  · Beverages: Water, clear liquids, soft drinks without caffeine, like ginger ale; mineral water (plain or flavored); decaffeinated tea and coffee.  · Soups: Clear broth, consommé, and bouillon Sports drinks aren't a good choice because they have too much sugar and not enough electrolytes. In this case, commercially available products called oral rehydration solutions are best.  · Desserts: Plain gelatin, popsicles, and fruit juice bars.  During the next 24 hours (the second day), you may add the following to the above if you have improved. If not, continue what you did the first day:  · Hot cereal, plain toast, bread, rolls, crackers  · Plain noodles, rice, mashed potatoes, chicken noodle or rice soup  · Unsweetened canned fruit (avoid pineapple), bananas  · Limit caffeine and chocolate. No spices or seasonings except salt.  During the next 24 hours  · Gradually resume a normal diet, as you feel better and your symptoms  improve.  · If at any time your symptoms start getting worse, go back to clear liquids until you feel better.  Food preparation  · If you have diarrhea, you should not prepare food for others. When you  prepare food for yourself, wash your hands before and after.  · Wash your hands after using cutting boards, countertops, and knives that have been in contact with raw food.  · Keep uncooked meats away from cooked and ready-to-eat foods.  Follow-up care  Follow up with your healthcare provider if you are not improving over the next 2 to 3 days, or as advised. If a stool (diarrhea) sample was taken, call for the results as directed.  When to seek medical care  Call your healthcare provider right away if any of these occur:   · Increasing abdominal pain or constant lower right abdominal pain  · Continued vomiting (unable to keep liquids down)  · Frequent diarrhea (more than 5 times a day)  · Blood in vomit or stool (black or red color)  · Inability to tolerate solid food after a few days.  · Dark urine, reduced urine output  · Weakness, dizziness  · Drowsiness  · Fever of 100.4ºF (38.0ºC) or higher, or as directed by your healthcare provider  · New rash  Call 911  Call 911 if any of these occur:  · Trouble breathing  · Chest pain  · Confusion  · Severe drowsiness or trouble awakening  · Seizure  · Stiff neck  Date Last Reviewed: 11/16/2015  © 6928-6668 [employer]. [address] [place] [address]. All rights reserved. This information is not intended as a substitute for professional medical care. Always follow your healthcare professional's instructions.          Nonspecific Vomiting and Diarrhea (Adult)  Vomiting and diarrhea can have many causes, including:  · Helping your body get rid of harmful substances   · Gastroenteritis caused by viruses, parasites, bacteria, or toxins.  · Allergy to or side effect of a food or medicine  · Severe stress or worry (anxiety)   · Other  illnesses  · Pregnancy  It is often hard to pinpoint an exact cause, even with testing. Vomiting and diarrhea often go away within a day or two without problems. If they continue, though, they can lead to too much loss of fluid (dehydration). This can be serious if not treated.    Home care  Medications  · You may use acetaminophen or NSAID medicines like ibuprofen or naproxen to control fever, unless another medicine was prescribed. If you have chronic liver or kidney disease, talk with your healthcare provider before using these medicines. Also talk with your provider if you've had a stomach ulcer or gastrointestinal bleeding. Don't give aspirin to anyone under 18 years of age who is ill with a fever. Don't use NSAID medicines if you are already taking one for another condition (like arthritis) or are on aspirin (such as for heart disease or after a stroke)  · If medicines for diarrhea or vomiting were prescribed, take these only as directed. Never take these without a healthcare providers approval.  General care  · If symptoms are severe, rest at home for the next 24 hours, or until you are feeling better.  · Washing your hands with soap and water, or using alcohol-based hand  is the best way to stop the spread of infection. Wash your hands after touching anyone who is sick.  · Wash your hands after using the toilet and before meals. Clean the toilet after each use.  · Caffeine, tobacco, and alcohol can make the diarrhea, cramping, and pain worse. Remember, caffeine not only is in coffee, but also is in chocolate, some energy drinks, and teas.  Diet  · Water and clear liquids are important so you don't get dehydrated. Drink a small amount at a time. Don't guzzle down the drinks. That may increase your nausea, make cramping worse, and cause the drinks to come back up.  · Sports drinks may also help. Make sure they are not too sugary, because this can sometimes make things worse. Also, don't drink  beverages that are too acidic, like orange juice and grape juice.  · If you are very dehydrated, sports drinks aren't a good choice. They have too much sugar and not enough electrolytes. In this case, commercially available products called oral rehydration solutions are best.  Food  · Don't force yourself to eat, especially if you have cramps, diarrhea, or vomiting. Eat just a little at a time, and then wait a few minutes before you try to eat more.  · Don't eat fatty, greasy, spicy, or fried foods.  · Don't eat dairy products if you have diarrhea. They can make it worse.  During the first 24 hours (the first full day), follow the diet below:  · Beverages: Sports drinks, soft drinks without caffeine, mineral water, and decaffeinated tea and coffee  · Soups: Clear broth, consommé, and bouillon  · Desserts: Plain gelatin, popsicles, and fruit juice bars  During the next 24 hours (the second day), you may add the following to the above if you are better. If not, continue what you did the first day:  · Hot cereal, plain toast, bread, rolls, crackers  · Plain noodles, rice, mashed potatoes, chicken noodle or rice soup  · Unsweetened canned fruit (avoid pineapple), bananas  · Limit fat intake to less than 15 grams per day by avoiding margarine, butter, oils, mayonnaise, sauces, gravies, fried foods, peanut butter, meat, poultry, and fish.  · Limit fiber. Avoid raw or cooked vegetables, fresh fruits (except bananas) and bran cereals.  · Limit caffeine and chocolate. No spices or seasonings except salt.  During the next 24 hours:  · Gradually resume a normal diet, as you feel better and your symptoms improve.  · If at any time your symptoms start getting worse again, go back to clear liquids until you feel better.  Food preparation  · If you have diarrhea, you should not prepare food for others. When preparing foods, wash your hands before and after.  · Wash your hands or use alcohol-based  after using cutting  boards, countertops, and knives that have been in contact with raw food.  · Keep uncooked meats away from cooked and ready-to-eat foods.  Follow-up care  Follow up with your healthcare advisor, or as advised. Call if you do not get better in the next 2 to 3 days. If a stool (diarrhea) sample was taken, or cultures done, you will be told if they are positive, or if your treatment needs to be changed. You may call as directed for the results.  If X-rays were taken, and a radiologist has not yet looked at them, he or she will do so. You will be told if there is a change in the reading, especially if it affects your treatment.  Call 911  Call 911 if any of these occur:  · Trouble breathing  · Chest pain  · Confusion  · Severe drowsiness or trouble awakening  · Fainting or loss of consciousness  · Rapid heart rate  · Seizure  · Stiff neck  · Severe weakness, dizziness, or lightheadedness  When to seek medical advice  Call your healthcare provider right away if any of these occur:  · Bloody or black vomit or stools.  · Severe, steady abdominal pain or any abdominal pain that is getting worse.  · Severe headache or stiff neck  · An inability to hold down even sips of liquids for more than 12 hours.  · Vomiting that lasts more than 24 hours.  · Diarrhea that lasts more than 24 hours.  · Fever of 100.4°F (38.0°C) or higher that lasts more than 48 hours, or as directed by your health care provider  · Yellowish color to your skin or the whites of your eyes.  · Signs of dehydration, such as dry mouth, little urine (less than every 6 hours), or very dark urine.  Date Last Reviewed: 1/3/2016  © 8576-3608 [employer]. [address] [place] [address], [place] [address]. All rights reserved. This information is not intended as a substitute for professional medical care. Always follow your healthcare professional's instructions.          Your Scheduled Appointments     Mar 31, 2017  8:15 AM CDT   Injection with METAIRIE, ALLERGY  INJECTION   [place] [facility] ([place]    [address] [facility]  [place] [id number] [phone]            Apr 21, 2017 12:00 PM CDT   Anticoagulation with Samy Kumar Lauritaliana - Coumadin ([address] )    [address]  [facility] [address]   [phone]            May 04, 2017  7:00 AM CDT   Established Patient Visit with Rubi Young,    [place] [facility]    [address] [facility]  [place] [address] [phone]            May 04, 2017  8:00 AM CDT   Injection with ERNESTINEIRIE, ALLERGY INJECTION   [place] [facility]    [address] [facility]  [place] [address] [phone]            May 26, 2017  8:30 AM CDT   Established Patient Visit with MD Ernestine [facility] Cardiology ([place])    [address] [facility]  [place] [address] [phone]              Smoking Cessation     If you would like to quit smoking:   You may be eligible for free services if you are a [place] resident and started smoking cigarettes before September 1, 1988.  Call the Smoking Cessation Trust (SCT) toll free at (324) 403-8397 or (336) 075-4333.   Call 3-856-QUIT-NOW if you do not meet the above criteria.             [facility] complies with applicable Federal civil rights laws and does not discriminate on the basis of race, color, national origin, age, disability, or sex.        Language Assistance Services     ATTENTION: Language assistance services are available, free of charge. Please call [phone].      ATENCIÓN: Si habla español, tiene a valle disposición servicios gratuitos de asistencia lingüística. Llame al [phone].     CHÚ Ý: N?u b?n nói Ti?ng Vi?t, có các d?ch v? h? tr? ngôn ng? mi?n phí dành cho b?n. G?i s? [phone].

## 2017-03-25 NOTE — ED NOTES
Pt identifiers Jenniffer Jimenez were checked and correct  LOC: The patient is awake, alert, aware of environment with an appropriate affect. Oriented x3, speaking appropriately  APPEARANCE: Pt resting comfortably, in no acute distress, pt is clean and well groomed, clothing properly fastened  SKIN: Skin warm, dry and intact, normal skin turgor, moist mucus membranes  RESPIRATORY: Airway is open and patent, respirations are spontaneous, even and unlabored, normal effort and rate  CARDIAC: Normal rate and rhythm, no peripheral edema noted, capillary refill < 3 seconds, bilateral radial pulses 2+  ABDOMEN: Soft, non tender, non distended. Bowel sounds present x 4 quadrants. Pt c/o n/v/d. Pt c/o  bilateral  lower quadrant abdominal pain.   NEUROLOGIC: PERRLA, facial expression is symmetrical, patient moving all extremities spontaneously, normal sensation in all extremities when touched with a finger.  Follows all commands appropriately  MUSCULOSKELETAL: No obvious deformities.

## 2017-03-25 NOTE — ED PROVIDER NOTES
"Encounter Date: 3/25/2017    SCRIBE #1 NOTE: I, Jabari Oliver, am scribing for, and in the presence of,  Remi Stein MD. I have scribed the following portions of the note - the APC attestation.       History     Chief Complaint   Patient presents with    Abdominal Pain    Diarrhea     Review of patient's allergies indicates:   Allergen Reactions    Bactrim [sulfamethoxazole-trimethoprim]      arthritis    Dramamine [dimenhydrinate]     Tramadol Other (See Comments)     hallucinations    Beta-blockers (beta-adrenergic blocking agts)      Can not go on beta blockers for long period of time    Phenergan [promethazine]      per pt. request- saw sisters have bad reaction to drug     HPI Comments: 85-year-old white female with past medical history of hypertension, hyperlipidemia, Ménière's disease arthritis, heart failure, hyperlipidemia presents to the ED complaining of left lower quadrant abdominal pain that started 6:00 this morning.  She describes the pain as intermittent "sharp" that radiates across the lower abdomen.  She reports associated diarrhea, nausea, vomiting, decreased appetite, generalized weakness.  She denies any sick contacts, recent travel, recent antibiotic use, recent hospitalizations.  She denies any abdominal trauma.  Past surgical history significant for cholecystectomy.  She denies fever, chills, chest pain, shortness of breath, dysuria, hematuria, BRBPR, melena, headache.  She rarely drinks alcohol and denies tobacco or drug use.    The history is provided by the patient.     Past Medical History:   Diagnosis Date    Amblyopia of left eye 4/10/2013    Anxiety     Arthritis     Central retinal vein occlusion of left eye     Depression     Diastolic heart failure 8/20/2014    Exotropia of both eyes 2/6/2013    recession RSR 5.0mm w/ adj; recession LR os 5.0 w/ adj; resect MR os  4.0mm    History of resection of small bowel     Hyperlipidemia     Hypertension     " Hypertensive retinopathy of both eyes     Hypoglycemia     Macular degeneration     Meniere's disease of both ears     Other and unspecified hyperlipidemia     Posterior vitreous detachment of both eyes      Past Surgical History:   Procedure Laterality Date    APPENDECTOMY      CARDIAC CATHETERIZATION      CATARACT EXTRACTION W/  INTRAOCULAR LENS IMPLANT Bilateral      SECTION, CLASSIC      INNER EAR SURGERY      SINUS SURGERY      STRABISMUS SURGERY  13    RSR recession 5 mm, LLR recession 5 mm and LMR resection 4mm    STRABISMUS SURGERY  2014    recess LR OD 6mm    TONSILLECTOMY       Family History   Problem Relation Age of Onset    Hypertension Mother     Hypertension Father     Liver disease Sister     Diabetes Sister     Heart disease Sister     Diabetes Brother     No Known Problems Maternal Aunt     No Known Problems Maternal Uncle     No Known Problems Paternal Aunt     No Known Problems Paternal Uncle     No Known Problems Maternal Grandmother     No Known Problems Maternal Grandfather     No Known Problems Paternal Grandmother     No Known Problems Paternal Grandfather     No Known Problems Daughter     No Known Problems Son     Heart disease Sister     No Known Problems Son     No Known Problems Son     Cancer Neg Hx      in first degree relatives    Melanoma Neg Hx     Psoriasis Neg Hx     Lupus Neg Hx     Amblyopia Neg Hx     Blindness Neg Hx     Cataracts Neg Hx     Glaucoma Neg Hx     Macular degeneration Neg Hx     Retinal detachment Neg Hx     Strabismus Neg Hx     Stroke Neg Hx     Thyroid disease Neg Hx      Social History   Substance Use Topics    Smoking status: Former Smoker     Types: Cigarettes     Quit date: 10/29/1982    Smokeless tobacco: Former User    Alcohol use 0.0 oz/week     0 Standard drinks or equivalent per week      Comment: 1 -2 glasses since October     Review of Systems   Constitutional: Positive for appetite  change (decreased) and fever (subjective). Negative for chills.   HENT: Negative for congestion, rhinorrhea and sore throat.    Eyes: Negative for photophobia and visual disturbance.   Respiratory: Negative for shortness of breath.    Cardiovascular: Negative for chest pain.   Gastrointestinal: Positive for abdominal pain, diarrhea, nausea and vomiting. Negative for anal bleeding, blood in stool and constipation.   Genitourinary: Negative for dysuria and hematuria.   Musculoskeletal: Negative for back pain, neck pain and neck stiffness.   Skin: Negative for rash and wound.   Neurological: Negative for dizziness, syncope, weakness, light-headedness, numbness and headaches.   Psychiatric/Behavioral: Negative for confusion.       Physical Exam   Initial Vitals   BP Pulse Resp Temp SpO2   03/25/17 0750 03/25/17 0750 03/25/17 0750 03/25/17 0750 03/25/17 0750   125/71 108 16 98.9 °F (37.2 °C) 98 %     Physical Exam    Nursing note and vitals reviewed.  Constitutional: She appears well-developed and well-nourished. She is not diaphoretic. No distress.   HENT:   Head: Normocephalic and atraumatic.   Neck: Normal range of motion. Neck supple.   Cardiovascular: Regular rhythm and normal heart sounds. Tachycardia present.  Exam reveals no gallop and no friction rub.    No murmur heard.  Pulmonary/Chest: Breath sounds normal. She has no wheezes. She has no rhonchi. She has no rales.   Abdominal: Soft. Bowel sounds are normal. There is tenderness (L mid abdomen). There is no rigidity, no rebound, no guarding, no CVA tenderness, no tenderness at McBurney's point and negative Castellon's sign.       Musculoskeletal: Normal range of motion.   Neurological: She is alert and oriented to person, place, and time.   Skin: Skin is warm and dry. No rash noted. No erythema.   Psychiatric: She has a normal mood and affect.         ED Course   Procedures  Labs Reviewed   CBC W/ AUTO DIFFERENTIAL - Abnormal; Notable for the following:         Result Value    Hematocrit 36.7 (*)     RDW 14.7 (*)     Gran # 9.0 (*)     Lymph # 0.4 (*)     Mono # 0.2 (*)     Gran% 92.2 (*)     Lymph% 4.5 (*)     Mono% 2.1 (*)     All other components within normal limits   URINALYSIS - Abnormal; Notable for the following:     Appearance, UA Hazy (*)     Protein, UA 1+ (*)     Leukocytes, UA Trace (*)     All other components within normal limits   COMPREHENSIVE METABOLIC PANEL - Abnormal; Notable for the following:     Glucose 118 (*)     BUN, Bld 30 (*)     eGFR if  59.4 (*)     eGFR if non  51.5 (*)     All other components within normal limits   LIPASE   URINALYSIS MICROSCOPIC        Imaging Results         CT Abdomen Pelvis With Contrast (Final result) Result time:  03/25/17 12:43:27    Final result by Byron Li MD (03/25/17 12:43:27)    Impression:        1. No acute intra-abdominal findings to explain the patient's symptoms of LEFT lower quadrant pain.    2. Stable appearance of a focal hepatic hypodensity with peripheral enhancement measuring 2.3 cm in hepatic segment VIII as well as a region of heterogeneity within hepatic segment VII which is poorly evaluated on this study.  ______________________________________     Electronically signed by resident: BYRON LI MD  Date:     03/25/17  Time:    12:40            As the supervising and teaching physician, I personally reviewed the images and resident's interpretation and I agree with the findings.            Electronically signed by: RHONDA MARTINEZ MD  Date:     03/25/17  Time:    12:43     Narrative:    CT ABDOMEN/PELVIS    PROCEDURE COMMENTS: The patient was surveyed from the lung bases through the pelvis after the administration of 75 cc Omni 350 IV contrast. Data was reconstructed for coronal, sagittal, and axial images.    INDICATION: Abdominal pain with diarrhea    COMPARISON: CT abdomen pelvis with contrast 2/6/2016.    FINDINGS:    The lung bases are unremarkable.   There is no pleural fluid present.  The imaged portions of the heart appear normal.    The liver is normal in size and attenuation. There is redemonstration of a focal hepatic hypodensity with peripheral enhancement within hepatic segment VIII stable in size and appearance from prior examination measuring 2.3 cm (axial series 2, image 20). A second ill-defined region of parenchymal heterogeneity is again noted within hepatic segment VII which is poorly evaluated on this examination. There are no new hepatic lesions.    The gallbladder is surgically absent with cholecystectomy clips present in gallbladder fossa. There is no intra-or extrahepatic biliary ductal dilatation.    There is a small hiatal hernia. The stomach, spleen, pancreas, and adrenal glands are unremarkable.    The kidneys are normal in size and location demonstrating appropriate concentration and excretion of contrast on delayed imaging there is a small hypodensity of inferior pole the LEFT kidney is unchanged from prior examination..  There is a RIGHT extrarenal pelvis. There is no hydroureteronephrosis.  The urinary bladder demonstrate no significant abnormality. The uterus is surgically absent.    The abdominal aorta is normal in course and caliber significant calcific atherosclerosis along its course.    The small and large bowel are normal in appearance without findings to suggest obstruction or inflammation. There are scattered colonic diverticula along the sigmoid colon. There is no ascites, free fluid, or intraperitoneal free air. There is no abdominopelvic lymphadenopathy.    The osseous structures demonstrate age-appropriate degenerative change noting compression deformity of L1 and L3 vertebral bodies. There is no acute fracture or bony destructive process.  The extraperitoneal soft tissues are unremarkable.                 Medical Decision Making:   History:   Old Medical Records: I decided to obtain old medical records.  Old Records  Summarized: records from clinic visits.       <> Summary of Records: Echo from 2/3/15 shows EF of 60%  Clinical Tests:   Lab Tests: Ordered and Reviewed  Radiological Study: Reviewed and Ordered       APC / Resident Notes:   85-year-old white female with past medical history of hypertension, hyperlipidemia, Ménière's disease arthritis, heart failure, hyperlipidemia presents to the ED complaining of left lower quadrant abdominal pain that started 6:00 this morning.  Tachycardic at 108.  Regular rhythm without murmurs. Lungs CTA bilaterally without wheezes. Abdomen soft and tender in the L mid abdomen. Negative [place] sign and no tenderness at McBurneys point. No CVA tenderness. DDx includes but is not limited to diverticulitis, gastroenteritis, UTI, bowel obstruction, pyelonephritis, appendicitis. Will get labs and CT abdomen/pelvis.    CBC with no leukocytosis with WBC 9.81.  CMP shows elevated BUN at 30 with normal creatinine at 1.0.  Lipase WNL at 12.  UA with no signs of infection.    CT abdomen/pelvis with no acute intra-abdominal findings. Stable hypodensity within hepatic segment VII.    She was given levsin, fluids, and zofran with improvement of her symptoms.  She has not had any further episodes of emesis in the ED. Symptoms likely secondary to gastroenteritis. I do not feel that she needs any further labs or imaging at this time. Stable for discharge.    She was discharged with prescriptions for levsin and zofran.  She will follow up with her PCP.  She was given strict return precautions.  All of the patient's questions were answered.  I reviewed the patient's chart, labs, and imaging and discussed the case with my supervising physician.          Scribe Attestation:   Scribe #1: I performed the above scribed service and the documentation accurately describes the services I performed. I attest to the accuracy of the note.    Attending Attestation:     Physician Attestation Statement for NP/PA:   I  discussed this assessment and plan of this patient with the NP/PA, but I did not personally examine the patient. The face to face encounter was performed by the NP/PA.    Other NP/PA Attestation Additions:    History of Present Illness: 85 year old female with Hx off chronic back pain, A-fib and chronic anti-coagulation presents for evaluation of abdominal pain which began earlier this morning with nausea, vomiting and diarrhea.          Physician Attestation for Scribe:  Physician Attestation Statement for Scribe #1: I, Remi Stein MD, reviewed documentation, as scribed by Jabari Oliver in my presence, and it is both accurate and complete.                 ED Course     Clinical Impression:   The primary encounter diagnosis was Abdominal pain, unspecified location. Diagnoses of Non-intractable vomiting with nausea, unspecified vomiting type, Diarrhea, unspecified type, and Gastroenteritis were also pertinent to this visit.    Disposition:   Disposition: Discharged  Condition: Stable       Toya Wang PA-C  03/25/17 1525

## 2017-03-25 NOTE — ED NOTES
Pt presented to the ED c/o abdominal pain, nausea, vomiting, and diarrhea since last night. Pt alert. Pt states her diarrhea is loose. Pt states she took some tums this morning with little relief.

## 2017-03-25 NOTE — DISCHARGE INSTRUCTIONS
Follow up with your PCP. Take the nausea medication (zofran) every 8 hours. Take levsin as needed for abdominal cramping. Return to the ED for any new or worsening symptoms, including those listed below.        Abdominal Pain    Abdominal pain is pain in the stomach or belly area. Everyone has this pain from time to time. In many cases it goes away on its own. But abdominal pain can sometimes be due to a serious problem, such as appendicitis. So its important to know when to seek help.  Causes of abdominal pain  There are many possible causes of abdominal pain. Common causes in adults include:  · Constipation, diarrhea, or gas  · Stomach acid flowing back up into the esophagus (acid reflux or heartburn)  · Severe acid reflux, called GERD (gastroesophageal reflux disease)  · A sore in the lining of the stomach or small intestine (peptic ulcer)  · Inflammation of the gallbladder, liver, or pancreas  · Gallstones or kidney stones  · Appendicitis   · Intestinal blockage   · An internal organ pushing through a muscle or other tissue (hernia)  · Urinary tract infections  · In women, menstrual cramps, fibroids, or endometriosis  · Inflammation or infection of the intestines  Diagnosing the cause of abdominal pain  Your healthcare provider will do a physical exam help find the cause of your pain. If needed, tests will be ordered. Belly pain has many possible causes. So it can be hard to find the reason for your pain. Giving details about your pain can help. Tell your provider where and when you feel the pain, and what makes it better or worse. Also let your provider know if you have other symptoms such as:  · Fever  · Tiredness  · Upset stomach (nausea)  · Vomiting  · Changes in bathroom habits  Treating abdominal pain  Some causes of pain need emergency medical treatment right away. These include appendicitis or a bowel blockage. Other problems can be treated with rest, fluids, or medicines. Your healthcare provider can  give you specific instructions for treatment or self-care based on what is causing your pain.  If you have vomiting or diarrhea, sip water or other clear fluids. When you are ready to eat solid foods again, start with small amounts of easy-to-digest, low-fat foods. These include apple sauce, toast, or crackers.   When to seek medical care  Call 911 or go to the hospital right away if you:  · Cant pass stool and are vomiting  · Are vomiting blood or have bloody diarrhea or black, tarry diarrhea  · Have chest, neck, or shoulder pain  · Feel like you might pass out  · Have pain in your shoulder blades with nausea  · Have sudden, severe belly pain  · Have new, severe pain unlike any you have felt before  · Have a belly that is rigid, hard, and tender to touch  Call your healthcare provider if you have:  · Pain for more than 5 days  · Bloating for more than 2 days  · Diarrhea for more than 5 days  · A fever of 100.4°F (38.0°C) or higher, or as directed by your provider  · Pain that gets worse  · Weight loss for no reason  · Continued lack of appetite  · Blood in your stool  How to prevent abdominal pain  Here are some tips to help prevent abdominal pain:  · Eat smaller amounts of food at one time.  · Avoid greasy, fried, or other high-fat foods.  · Avoid foods that give you gas.  · Exercise regularly.  · Drink plenty of fluids.  To help prevent GERD symptoms:  · Quit smoking.  · Reduce alcohol and certain foods that increase stomach acid.  · Avoid aspirin and over-the-counter pain and fever medicines (NSAIDS or nonsteroidal anti-inflammatory drugs), if possible  · Lose extra weight.  · Finish eating at least 2 hours before you go to bed or lie down.  · Raise the head of your bed.  Date Last Reviewed: 7/1/2016  © 4420-9245 [employer]. [address] [place] [address], [place] [address]. All rights reserved. This information is not intended as a substitute for professional medical care. Always follow your healthcare  professional's instructions.          Noninfectious Gastroenteritis (Ages 6 Years to Adult)    Gastroenteritis can cause nausea, vomiting, diarrhea, and abdominal cramping. This may occur as a result of food sensitivity, inflammation of your gastrointestinal tract, medicines, stress, or other causes not related to infection. Your symptoms will usually last from 1 to 3 days, but can last longer. Antibiotics are not effective, but simple home treatment will be helpful.  Home care  Medicine  · You may use acetaminophen or NSAID medicines like ibuprofen or naproxen to control fever, unless another medicine is prescribed. (Note: If you have chronic liver or kidney disease, or ever had a stomach ulcer or gastrointestinalI bleeding, talk with your healthcare provider before using these medicines.) Aspirin should never be used in anyone under 18 years of age who is ill with a fever. It may cause severe liver damage. Don't increase your NSAID medicines if you are already taking these medicines for another condition (like arthritis). Don't use NSAIDS if you are on aspirin (such as for heart disease, or after a stroke).  · If medicines for diarrhea or vomiting are prescribed, take only as directed.  General care and preventing spread of the illness  · If symptoms are severe, rest at home for the next 24 hours or until you feel better.  · Hand washing with soap and water is the best way to prevent the spread of infection. Wash your hands after touching anyone who is sick.  · Wash your hands after using the toilet and before meals. Clean the toilet after each use.  · Caffeine, tobacco, and alcohol can make your diarrhea, cramping, and pain worse.  Diet  · Water and clear liquids are important so you do not get dehydrated. Drink a small amount at a time.  · Do not force yourself to eat, especially if you have cramps, vomiting, or diarrhea. When you finally decide to start eating, do not eat large amounts at a time, even if you  are hungry.  · If you eat, avoid fatty, greasy, spicy, or fried foods.  · Do not eat dairy products if you have diarrhea; they can make the diarrhea worse.  During the first 24 hours (the first full day), follow the diet below:  · Beverages: Water, clear liquids, soft drinks without caffeine, like ginger ale; mineral water (plain or flavored); decaffeinated tea and coffee.  · Soups: Clear broth, consommé, and bouillon Sports drinks aren't a good choice because they have too much sugar and not enough electrolytes. In this case, commercially available products called oral rehydration solutions are best.  · Desserts: Plain gelatin, popsicles, and fruit juice bars.  During the next 24 hours (the second day), you may add the following to the above if you have improved. If not, continue what you did the first day:  · Hot cereal, plain toast, bread, rolls, crackers  · Plain noodles, rice, mashed potatoes, chicken noodle or rice soup  · Unsweetened canned fruit (avoid pineapple), bananas  · Limit caffeine and chocolate. No spices or seasonings except salt.  During the next 24 hours  · Gradually resume a normal diet, as you feel better and your symptoms improve.  · If at any time your symptoms start getting worse, go back to clear liquids until you feel better.  Food preparation  · If you have diarrhea, you should not prepare food for others. When you  prepare food for yourself, wash your hands before and after.  · Wash your hands after using cutting boards, countertops, and knives that have been in contact with raw food.  · Keep uncooked meats away from cooked and ready-to-eat foods.  Follow-up care  Follow up with your healthcare provider if you are not improving over the next 2 to 3 days, or as advised. If a stool (diarrhea) sample was taken, call for the results as directed.  When to seek medical care  Call your healthcare provider right away if any of these occur:   · Increasing abdominal pain or constant lower right  abdominal pain  · Continued vomiting (unable to keep liquids down)  · Frequent diarrhea (more than 5 times a day)  · Blood in vomit or stool (black or red color)  · Inability to tolerate solid food after a few days.  · Dark urine, reduced urine output  · Weakness, dizziness  · Drowsiness  · Fever of 100.4ºF (38.0ºC) or higher, or as directed by your healthcare provider  · New rash  Call 911  Call 911 if any of these occur:  · Trouble breathing  · Chest pain  · Confusion  · Severe drowsiness or trouble awakening  · Seizure  · Stiff neck  Date Last Reviewed: 11/16/2015 © 2000-2016 [employer]. [address] [place] [address], [place] [address]. All rights reserved. This information is not intended as a substitute for professional medical care. Always follow your healthcare professional's instructions.          Nonspecific Vomiting and Diarrhea (Adult)  Vomiting and diarrhea can have many causes, including:  · Helping your body get rid of harmful substances   · Gastroenteritis caused by viruses, parasites, bacteria, or toxins.  · Allergy to or side effect of a food or medicine  · Severe stress or worry (anxiety)   · Other illnesses  · Pregnancy  It is often hard to pinpoint an exact cause, even with testing. Vomiting and diarrhea often go away within a day or two without problems. If they continue, though, they can lead to too much loss of fluid (dehydration). This can be serious if not treated.    Home care  Medications  · You may use acetaminophen or NSAID medicines like ibuprofen or naproxen to control fever, unless another medicine was prescribed. If you have chronic liver or kidney disease, talk with your healthcare provider before using these medicines. Also talk with your provider if you've had a stomach ulcer or gastrointestinal bleeding. Don't give aspirin to anyone under 18 years of age who is ill with a fever. Don't use NSAID medicines if you are already taking one for another condition (like  arthritis) or are on aspirin (such as for heart disease or after a stroke)  · If medicines for diarrhea or vomiting were prescribed, take these only as directed. Never take these without a healthcare providers approval.  General care  · If symptoms are severe, rest at home for the next 24 hours, or until you are feeling better.  · Washing your hands with soap and water, or using alcohol-based hand  is the best way to stop the spread of infection. Wash your hands after touching anyone who is sick.  · Wash your hands after using the toilet and before meals. Clean the toilet after each use.  · Caffeine, tobacco, and alcohol can make the diarrhea, cramping, and pain worse. Remember, caffeine not only is in coffee, but also is in chocolate, some energy drinks, and teas.  Diet  · Water and clear liquids are important so you don't get dehydrated. Drink a small amount at a time. Don't guzzle down the drinks. That may increase your nausea, make cramping worse, and cause the drinks to come back up.  · Sports drinks may also help. Make sure they are not too sugary, because this can sometimes make things worse. Also, don't drink beverages that are too acidic, like orange juice and grape juice.  · If you are very dehydrated, sports drinks aren't a good choice. They have too much sugar and not enough electrolytes. In this case, commercially available products called oral rehydration solutions are best.  Food  · Don't force yourself to eat, especially if you have cramps, diarrhea, or vomiting. Eat just a little at a time, and then wait a few minutes before you try to eat more.  · Don't eat fatty, greasy, spicy, or fried foods.  · Don't eat dairy products if you have diarrhea. They can make it worse.  During the first 24 hours (the first full day), follow the diet below:  · Beverages: Sports drinks, soft drinks without caffeine, mineral water, and decaffeinated tea and coffee  · Soups: Clear broth, consommé, and  bouillon  · Desserts: Plain gelatin, popsicles, and fruit juice bars  During the next 24 hours (the second day), you may add the following to the above if you are better. If not, continue what you did the first day:  · Hot cereal, plain toast, bread, rolls, crackers  · Plain noodles, rice, mashed potatoes, chicken noodle or rice soup  · Unsweetened canned fruit (avoid pineapple), bananas  · Limit fat intake to less than 15 grams per day by avoiding margarine, butter, oils, mayonnaise, sauces, gravies, fried foods, peanut butter, meat, poultry, and fish.  · Limit fiber. Avoid raw or cooked vegetables, fresh fruits (except bananas) and bran cereals.  · Limit caffeine and chocolate. No spices or seasonings except salt.  During the next 24 hours:  · Gradually resume a normal diet, as you feel better and your symptoms improve.  · If at any time your symptoms start getting worse again, go back to clear liquids until you feel better.  Food preparation  · If you have diarrhea, you should not prepare food for others. When preparing foods, wash your hands before and after.  · Wash your hands or use alcohol-based  after using cutting boards, countertops, and knives that have been in contact with raw food.  · Keep uncooked meats away from cooked and ready-to-eat foods.  Follow-up care  Follow up with your healthcare advisor, or as advised. Call if you do not get better in the next 2 to 3 days. If a stool (diarrhea) sample was taken, or cultures done, you will be told if they are positive, or if your treatment needs to be changed. You may call as directed for the results.  If X-rays were taken, and a radiologist has not yet looked at them, he or she will do so. You will be told if there is a change in the reading, especially if it affects your treatment.  Call 911  Call 911 if any of these occur:  · Trouble breathing  · Chest pain  · Confusion  · Severe drowsiness or trouble awakening  · Fainting or loss of  consciousness  · Rapid heart rate  · Seizure  · Stiff neck  · Severe weakness, dizziness, or lightheadedness  When to seek medical advice  Call your healthcare provider right away if any of these occur:  · Bloody or black vomit or stools.  · Severe, steady abdominal pain or any abdominal pain that is getting worse.  · Severe headache or stiff neck  · An inability to hold down even sips of liquids for more than 12 hours.  · Vomiting that lasts more than 24 hours.  · Diarrhea that lasts more than 24 hours.  · Fever of 100.4°F (38.0°C) or higher that lasts more than 48 hours, or as directed by your health care provider  · Yellowish color to your skin or the whites of your eyes.  · Signs of dehydration, such as dry mouth, little urine (less than every 6 hours), or very dark urine.  Date Last Reviewed: 1/3/2016  © 6289-5646 [employer]. [address] [place] [address], [place] [address]. All rights reserved. This information is not intended as a substitute for professional medical care. Always follow your healthcare professional's instructions.

## 2017-03-25 NOTE — TELEPHONE ENCOUNTER
"  Reason for Disposition   Patient sounds very sick or weak to the triager    Answer Assessment - Initial Assessment Questions  1. LOCATION: "Where does it hurt?"       Lower all over  2. RADIATION: "Does the pain shoot anywhere else?" (e.g., chest, back)      no  3. ONSET: "When did the pain begin?" (e.g., minutes, hours or days ago)       1-2 hours ago  4. SUDDEN: "Gradual or sudden onset?"      Like a shooting pain not sure may have been there last night  5. PATTERN "Does the pain come and go, or is it constant?"     - If constant: "Is it getting better, staying the same, or worsening?"       (Note: Constant means the pain never goes away completely; most serious pain is constant and it progresses)      - If intermittent: "How long does it last?" "Do you have pain now?"      (Note: Intermittent means the pain goes away completely between bouts)      Comes and goes  6. SEVERITY: "How bad is the pain?"  (e.g., Scale 1-10; mild, moderate, or severe)    - MILD (1-3): doesn't interfere with normal activities, abdomen soft and not tender to touch     - MODERATE (4-7): interferes with normal activities or awakens from sleep, tender to touch     - SEVERE (8-10): excruciating pain, doubled over, unable to do any normal activities       6/10  7. RECURRENT SYMPTOM: "Have you ever had this type of abdominal pain before?" If so, ask: "When was the last time?" and "What happened that time?"       Yes has had not sure what is cause  8. CAUSE: "What do you think is causing the abdominal pain?"      Not sure  9. RELIEVING/AGGRAVATING FACTORS: "What makes it better or worse?" (e.g., movement, antacids, bowel movement)      Had diarrhea and nausea took tums and vomited  10. OTHER SYMPTOMS: "Has there been any vomiting, diarrhea, constipation, or urine problems?"        Vomiting and diarrhea  11. PREGNANCY: "Is there any chance you are pregnant?" "When was your last menstrual period?"        no    Protocols used: ST ABDOMINAL PAIN " - FEMALE-A-  pt states she felt weak  And faint. Advised to ED pt 85 years old.  Verbalized understanding.

## 2017-03-25 NOTE — TELEPHONE ENCOUNTER
Has had stomache, nausea and vomiting. History of Afib and CHF. Having abdominal pain.  On coumadin.

## 2017-03-29 ENCOUNTER — CLINICAL SUPPORT (OUTPATIENT)
Dept: ALLERGY | Facility: CLINIC | Age: 82
End: 2017-03-29
Payer: MEDICARE

## 2017-03-29 DIAGNOSIS — J30.9 CHRONIC ALLERGIC RHINITIS, UNSPECIFIED SEASONALITY, UNSPECIFIED TRIGGER: ICD-10-CM

## 2017-03-29 PROCEDURE — 99499 UNLISTED E&M SERVICE: CPT | Mod: S$GLB,,, | Performed by: ALLERGY & IMMUNOLOGY

## 2017-03-29 PROCEDURE — 95165 ANTIGEN THERAPY SERVICES: CPT | Mod: S$GLB,,, | Performed by: ALLERGY & IMMUNOLOGY

## 2017-03-31 ENCOUNTER — CLINICAL SUPPORT (OUTPATIENT)
Dept: INTERNAL MEDICINE | Facility: CLINIC | Age: 82
End: 2017-03-31
Payer: MEDICARE

## 2017-03-31 ENCOUNTER — OFFICE VISIT (OUTPATIENT)
Dept: INTERNAL MEDICINE | Facility: CLINIC | Age: 82
End: 2017-03-31
Payer: MEDICARE

## 2017-03-31 ENCOUNTER — LAB VISIT (OUTPATIENT)
Dept: LAB | Facility: HOSPITAL | Age: 82
End: 2017-03-31
Attending: INTERNAL MEDICINE
Payer: MEDICARE

## 2017-03-31 VITALS
RESPIRATION RATE: 15 BRPM | HEIGHT: 67 IN | TEMPERATURE: 99 F | HEART RATE: 86 BPM | SYSTOLIC BLOOD PRESSURE: 120 MMHG | DIASTOLIC BLOOD PRESSURE: 60 MMHG | WEIGHT: 179.88 LBS | BODY MASS INDEX: 28.23 KG/M2

## 2017-03-31 VITALS
WEIGHT: 180 LBS | SYSTOLIC BLOOD PRESSURE: 120 MMHG | RESPIRATION RATE: 15 BRPM | TEMPERATURE: 99 F | DIASTOLIC BLOOD PRESSURE: 60 MMHG | HEART RATE: 86 BPM | BODY MASS INDEX: 28.25 KG/M2 | HEIGHT: 67 IN

## 2017-03-31 DIAGNOSIS — R26.81 GAIT INSTABILITY: ICD-10-CM

## 2017-03-31 DIAGNOSIS — I50.32 CHRONIC DIASTOLIC HEART FAILURE: ICD-10-CM

## 2017-03-31 DIAGNOSIS — R73.03 PREDIABETES: ICD-10-CM

## 2017-03-31 DIAGNOSIS — E78.00 PURE HYPERCHOLESTEROLEMIA: ICD-10-CM

## 2017-03-31 DIAGNOSIS — Z79.01 LONG TERM (CURRENT) USE OF ANTICOAGULANTS: ICD-10-CM

## 2017-03-31 DIAGNOSIS — R73.01 IMPAIRED FASTING GLUCOSE: ICD-10-CM

## 2017-03-31 DIAGNOSIS — H52.01 HYPERMETROPIA OF RIGHT EYE: ICD-10-CM

## 2017-03-31 DIAGNOSIS — M48.061 LUMBAR SPINAL STENOSIS: ICD-10-CM

## 2017-03-31 DIAGNOSIS — H90.3 SENSORINEURAL HEARING LOSS (SNHL) OF BOTH EARS: ICD-10-CM

## 2017-03-31 DIAGNOSIS — N18.30 CHRONIC KIDNEY DISEASE, STAGE III (MODERATE): ICD-10-CM

## 2017-03-31 DIAGNOSIS — I51.89 LEFT VENTRICULAR DIASTOLIC DYSFUNCTION WITH PRESERVED SYSTOLIC FUNCTION: ICD-10-CM

## 2017-03-31 DIAGNOSIS — I10 BENIGN ESSENTIAL HTN: ICD-10-CM

## 2017-03-31 DIAGNOSIS — Z00.00 ENCOUNTER FOR PREVENTIVE HEALTH EXAMINATION: ICD-10-CM

## 2017-03-31 DIAGNOSIS — H34.8192 CRVO (CENTRAL RETINAL VEIN OCCLUSION): ICD-10-CM

## 2017-03-31 DIAGNOSIS — J31.0 CHRONIC RHINITIS: ICD-10-CM

## 2017-03-31 DIAGNOSIS — H50.10 EXOTROPIA OF BOTH EYES: ICD-10-CM

## 2017-03-31 DIAGNOSIS — I10 BENIGN ESSENTIAL HTN: Primary | ICD-10-CM

## 2017-03-31 DIAGNOSIS — I70.0 ATHEROSCLEROSIS OF AORTA: ICD-10-CM

## 2017-03-31 DIAGNOSIS — H81.09 MENIERE'S DISEASE, UNSPECIFIED LATERALITY: ICD-10-CM

## 2017-03-31 DIAGNOSIS — Z96.1 PSEUDOPHAKIA, BOTH EYES: ICD-10-CM

## 2017-03-31 DIAGNOSIS — M81.0 OSTEOPOROSIS: ICD-10-CM

## 2017-03-31 DIAGNOSIS — R26.89 BALANCE PROBLEMS: ICD-10-CM

## 2017-03-31 DIAGNOSIS — J30.1 SEASONAL ALLERGIC RHINITIS DUE TO POLLEN: ICD-10-CM

## 2017-03-31 DIAGNOSIS — I48.20 CHRONIC ATRIAL FIBRILLATION: ICD-10-CM

## 2017-03-31 DIAGNOSIS — M47.817 FACET ARTHROPATHY, LUMBOSACRAL: ICD-10-CM

## 2017-03-31 DIAGNOSIS — Z98.890 HISTORY OF STRABISMUS SURGERY: ICD-10-CM

## 2017-03-31 DIAGNOSIS — M19.90 ARTHRITIS: Primary | ICD-10-CM

## 2017-03-31 DIAGNOSIS — M19.011 PRIMARY OSTEOARTHRITIS OF RIGHT SHOULDER: ICD-10-CM

## 2017-03-31 DIAGNOSIS — Z86.73 HISTORY OF TIA (TRANSIENT ISCHEMIC ATTACK): ICD-10-CM

## 2017-03-31 DIAGNOSIS — E66.09 NON MORBID OBESITY DUE TO EXCESS CALORIES: ICD-10-CM

## 2017-03-31 LAB
ALBUMIN SERPL BCP-MCNC: 3.7 G/DL
ALP SERPL-CCNC: 109 U/L
ALT SERPL W/O P-5'-P-CCNC: 12 U/L
ANION GAP SERPL CALC-SCNC: 12 MMOL/L
AST SERPL-CCNC: 19 U/L
BILIRUB SERPL-MCNC: 0.8 MG/DL
BUN SERPL-MCNC: 8 MG/DL
CALCIUM SERPL-MCNC: 9.4 MG/DL
CHLORIDE SERPL-SCNC: 104 MMOL/L
CHOLEST/HDLC SERPL: 3.2 {RATIO}
CO2 SERPL-SCNC: 25 MMOL/L
CREAT SERPL-MCNC: 1 MG/DL
EST. GFR  (AFRICAN AMERICAN): 59.4 ML/MIN/1.73 M^2
EST. GFR  (NON AFRICAN AMERICAN): 51.5 ML/MIN/1.73 M^2
GLUCOSE SERPL-MCNC: 103 MG/DL
HDL/CHOLESTEROL RATIO: 31.4 %
HDLC SERPL-MCNC: 172 MG/DL
HDLC SERPL-MCNC: 54 MG/DL
LDLC SERPL CALC-MCNC: 96.6 MG/DL
NONHDLC SERPL-MCNC: 118 MG/DL
POTASSIUM SERPL-SCNC: 4.7 MMOL/L
PROT SERPL-MCNC: 7.1 G/DL
SODIUM SERPL-SCNC: 141 MMOL/L
TRIGL SERPL-MCNC: 107 MG/DL

## 2017-03-31 PROCEDURE — 99999 PR PBB SHADOW E&M-EST. PATIENT-LVL IV: CPT | Mod: PBBFAC,,, | Performed by: NURSE PRACTITIONER

## 2017-03-31 PROCEDURE — 99999 PR PBB SHADOW E&M-EST. PATIENT-LVL III: CPT | Mod: PBBFAC,,, | Performed by: INTERNAL MEDICINE

## 2017-03-31 PROCEDURE — 99214 OFFICE O/P EST MOD 30 MIN: CPT | Mod: S$GLB,,, | Performed by: INTERNAL MEDICINE

## 2017-03-31 PROCEDURE — 80053 COMPREHEN METABOLIC PANEL: CPT

## 2017-03-31 PROCEDURE — 1126F AMNT PAIN NOTED NONE PRSNT: CPT | Mod: S$GLB,,, | Performed by: INTERNAL MEDICINE

## 2017-03-31 PROCEDURE — 99499 UNLISTED E&M SERVICE: CPT | Mod: S$GLB,,, | Performed by: INTERNAL MEDICINE

## 2017-03-31 PROCEDURE — 99999 PR PBB SHADOW E&M-EST. PATIENT-LVL I: CPT | Mod: PBBFAC,,,

## 2017-03-31 PROCEDURE — 80061 LIPID PANEL: CPT

## 2017-03-31 PROCEDURE — 1159F MED LIST DOCD IN RCRD: CPT | Mod: S$GLB,,, | Performed by: INTERNAL MEDICINE

## 2017-03-31 PROCEDURE — 1160F RVW MEDS BY RX/DR IN RCRD: CPT | Mod: S$GLB,,, | Performed by: INTERNAL MEDICINE

## 2017-03-31 PROCEDURE — 93005 ELECTROCARDIOGRAM TRACING: CPT | Mod: S$GLB,,, | Performed by: INTERNAL MEDICINE

## 2017-03-31 PROCEDURE — 99499 UNLISTED E&M SERVICE: CPT | Mod: S$GLB,,, | Performed by: NURSE PRACTITIONER

## 2017-03-31 PROCEDURE — G0439 PPPS, SUBSEQ VISIT: HCPCS | Mod: S$GLB,,, | Performed by: NURSE PRACTITIONER

## 2017-03-31 PROCEDURE — 36415 COLL VENOUS BLD VENIPUNCTURE: CPT | Mod: PO

## 2017-03-31 PROCEDURE — 83036 HEMOGLOBIN GLYCOSYLATED A1C: CPT

## 2017-03-31 PROCEDURE — 95115 IMMUNOTHERAPY ONE INJECTION: CPT | Mod: S$GLB,,, | Performed by: FAMILY MEDICINE

## 2017-03-31 PROCEDURE — 3078F DIAST BP <80 MM HG: CPT | Mod: S$GLB,,, | Performed by: INTERNAL MEDICINE

## 2017-03-31 PROCEDURE — 99499 UNLISTED E&M SERVICE: CPT | Mod: S$GLB,,, | Performed by: FAMILY MEDICINE

## 2017-03-31 PROCEDURE — 93010 ELECTROCARDIOGRAM REPORT: CPT | Mod: S$GLB,,, | Performed by: INTERNAL MEDICINE

## 2017-03-31 PROCEDURE — 3074F SYST BP LT 130 MM HG: CPT | Mod: S$GLB,,, | Performed by: INTERNAL MEDICINE

## 2017-03-31 PROCEDURE — 1157F ADVNC CARE PLAN IN RCRD: CPT | Mod: S$GLB,,, | Performed by: INTERNAL MEDICINE

## 2017-03-31 NOTE — PATIENT INSTRUCTIONS
Counseling and Referral of Other Preventative  (Italic type indicates deductible and co-insurance are waived)    Patient Name: Jenniffer Jimenez  Today's Date: 3/31/2017      SERVICE LIMITATIONS RECOMMENDATION    Vaccines    · Pneumococcal (once after 65)    · Influenza (annually)    · Hepatitis B (if medium/high risk)    · Prevnar 13      Hepatitis B medium/high risk factors:       - End-stage renal disease       - Hemophiliacs who received Factor VII or         IX concentrates       - Clients of institutions for the mentally             retarded       - Persons who live in the same house as          a HepB carrier       - Homosexual men       - Illicit injectable drug abusers     Pneumococcal: current     Influenza: current     Hepatitis B: declined     Prevnar 13: current    Mammogram (biennial age 50-74)  Annually (age 40 or over)  last 6/27/16-due June    Pap (up to age 70 and after 70 if unknown history or abnormal study last 10 years)    N/A         Colorectal cancer screening (to age 75)    · Fecal occult blood test (annual)  · Flexible sigmoidoscopy (5y)  · Screening colonoscopy (10y)  · Barium enema   last 2009- she will discuss need with PCP     Diabetes self-management training (no USPSTF recommendations)  Requires referral by treating physician for patient with diabetes or renal disease. 10 hours of initial DSMT sessions of no less than 30 minutes each in a continuous 12-month period. 2 hours of follow-up DSMT in subsequent years.  N/A    Bone mass measurements (age 65 & older, biennial)  Requires diagnosis related to osteoporosis or estrogen deficiency. Biennial benefit unless patient has history of long-term glucocorticoid  last 6/27/16 due 2018    Glaucoma screening (no USPSTF recommendation)  Diabetes mellitus, family history   , age 50 or over    American, age 65 or over  current    Medical nutrition therapy for diabetes or renal disease (no recommended schedule)  Requires  referral by treating physician for patient with diabetes or renal disease or kidney transplant within the past 3 years.  Can be provided in same year as diabetes self-management training (DSMT), and CMS recommends medical nutrition therapy take place after DSMT. Up to 3 hours for initial year and 2 hours in subsequent years.  N/A    Cardiovascular screening blood tests (every 5 years)  · Fasting lipid panel  Order as a panel if possible  current    Diabetes screening tests (at least every 3 years, Medicare covers annually or at 6-month intervals for prediabetic patients)  · Fasting blood sugar (FBS) or glucose tolerance test (GTT)  Patient must be diagnosed with one of the following:       - Hypertension       - Dyslipidemia       - Obesity (BMI 30kg/m2)       - Previous elevated impaired FBS or GTT       ... or any two of the following:       - Overweight (BMI 25 but <30)       - Family history of diabetes       - Age 65 or older       - History of gestational diabetes or birth of baby weighing more than 9 pounds  current    Abdominal aortic aneurysm screening (once)  · Sonogram   Limited to patients who meet one of the following criteria:       - Men who are 65-75 years old and have smoked more than 100 cigarette in their lifetime       - Anyone with a family history of abdominal aortic aneurysm       - Anyone recommended for screening by the USPSTF  N/A    HIV screening (annually for increased risk patients)  · HIV-1 and HIV-2 by EIA, or ANAHI, rapid antibody test or oral mucosa transudate  Patients must be at increased risk for HIV infection per USPSTF guidelines or pregnant. Tests covered annually for patient at increased risk or as requested by the patient. Pregnant patients may receive up to 3 tests during pregnancy.  Risks discussed, screening is declined    Smoking cessation counseling (up to 8 sessions per year)  Patients must be asymptomatic of tobacco-related conditions to receive as a preventative  service.  N/A    Subsequent annual wellness visit  At least 12 months since last AWV  Return in one year     The following information is provided to all patients.  This information is to help you find resources for any of the problems found today that may be affecting your health:                Living healthy guide: www.[facility].[place].[facility]      Understanding Diabetes: www.diabetes.org      Eating healthy: www.cdc.gov/healthyweight      CDC home safety checklist: www.cdc.gov/steadi/patient.html      Agency on Aging: www.goea.[place].[facility]      Alcoholics anonymous (AA): www.aa.org      Physical Activity: www.nichelle.nih.gov/fe7zznw      Tobacco use: www.quitwithusla.org     Fall Prevention  Falls often occur due to slipping, tripping or losing your balance. Millions of people fall every year and injure themselves. Here are ways to reduce your risk of falling again.  · Think about your fall, was there anything that caused your fall that can be fixed, removed, or replaced?  · Make your home safe by keeping walkways clear of objects you may trip over.  · Use non-slip pads under rugs. Do not use area rugs or small throw rugs.  · Use non-slip mats in bathtubs and showers.  · Install handrails and lights on staircases.  · Do not walk in poorly lit areas.  · Do not stand on chairs or wobbly ladders.  · Use caution when reaching overhead or looking upward. This position can cause a loss of balance.  · Be sure your shoes fit properly, have non-slip bottoms and are in good condition.   · Wear shoes both inside and out. Avoid going barefoot or wearing slippers.  · Be cautious when going up and down stairs, curbs, and when walking on uneven sidewalks.  · If your balance is poor, consider using a cane or walker.  · If your fall was related to alcohol use, stop or limit alcohol intake.   · If your fall was related to use of sleeping medicines, talk to your doctor about this. You may need to reduce your dosage at bedtime if you awaken  during the night to go to the bathroom.    · To reduce the need for nighttime bathroom trips:  ¨ Avoid drinking fluids for several hours before going to bed  ¨ Empty your bladder before going to bed  ¨ Men can keep a urinal at the bedside  · Stay as active as you can. Balance, flexibility, strength, and endurance all come from exercise. They all play a role in preventing falls. Ask your healthcare provider which types of activity are right for you.  · Get your vision checked on a regular basis.  · If you have pets, know where they are before you stand up or walk so you don't trip over them.  · Use night lights.  Date Last Reviewed: 11/5/2015  © 0667-4666 [employer]. [address] [place] [address], [place] [address]. All rights reserved. This information is not intended as a substitute for professional medical care. Always follow your healthcare professional's instructions.        Preventing Falls: Moving Safely Using a Cane or Walker     Keep the cane away from your feet so you dont trip.     A walking aid, such as a cane or walker, can help you stay more independent and avoid falls. Remember to keep your walking aid within easy reach when you're in a chair or in bed. And learn how to use it safely so you don't injure yourself. Be sure the cane or walker is the correct height. Hang your arm loosely at your side, and measure the distance from your wrist to the floor. The distance should be the same as the height of your cane or walker.  Using a cane  If you have a stronger side, hold the cane on the side of your stronger leg.  1. Get your balance.  2. Move the cane and your weaker leg forward.  3. Support your weight on both the cane and your weaker side.  4. Step with your stronger leg.  5. Start again from step 1.     If youre using a folding walker, be sure you know how to lock it open. Check that its locked open before each use.   Using a walker  1. Roll the walker (or lift it, if you're using one  without wheels) forward about 12 inches.  2. Step forward with your weaker leg first.  3. Use the walker to help keep your balance.  4. Bring your other foot forward to the center of the walker.  5. Start again from step 1.  Helpful tips  · Check with your health care provider about the right walking aid to use. Ask about a walker with a seat attached.  · Check the tips of your cane or walker to make sure they have nonskid covers.  · Move slowly from room to room. Don't rush.  · Sit down to get dressed.  · Use a oma pack or backpack to keep your hands free.  · Get help for jobs that mean climbing, even on a stepstool.  · Do not try going up or down stairs using a walker.   Date Last Reviewed: 6/12/2015  © 9899-7483 [employer]. [address] [place] [address], [place] [address]. All rights reserved. This information is not intended as a substitute for professional medical care. Always follow your healthcare professional's instructions.

## 2017-03-31 NOTE — PROGRESS NOTES
Subjective:       Patient ID: Jenniffer Jimenez is a 85 y.o. female.    Chief Complaint: Follow-up (ER)    Patient is a 85 y.o.female who presents today for ER follow up and six month follow up. She went to the ER on 3/25/17 for abdominal pain. She had labs done and a CT scan and no acute findings were noted at that time. She was treated with levsin and zofran and her symptoms improved.    Cholesterol: (due now)  Vaccines: Influenza (yearly); Tetanus (up to date) ; Pneumovax (2010; Prevnar 2015); Zoster (declines)  Eye exam: sept 2015  Mammogram: June 2016  Gyn exam: declines  Colonoscopy: June 2009; repeat in June 2019  DEXA: march 2016; due in 2018  Exercise: yes; walking  Diet: healthy    Review of Systems   Constitutional: Negative for appetite change, chills, diaphoresis, fatigue and fever.   HENT: Negative for congestion, dental problem, ear discharge, ear pain, hearing loss, postnasal drip, sinus pressure and sore throat.    Eyes: Negative for discharge, redness and itching.   Respiratory: Negative for cough, chest tightness, shortness of breath and wheezing.    Cardiovascular: Negative for chest pain, palpitations and leg swelling.   Gastrointestinal: Negative for abdominal pain, constipation, diarrhea, nausea and vomiting.   Endocrine: Negative for cold intolerance and heat intolerance.   Genitourinary: Negative for difficulty urinating, frequency, hematuria and urgency.   Musculoskeletal: Negative for arthralgias, back pain, gait problem, myalgias and neck pain.   Skin: Negative for color change and rash.   Neurological: Negative for dizziness, syncope and headaches.   Hematological: Negative for adenopathy.   Psychiatric/Behavioral: Negative for behavioral problems and sleep disturbance. The patient is not nervous/anxious.        Objective:      Physical Exam   Constitutional: She is oriented to person, place, and time. She appears well-developed and well-nourished. No distress.   HENT:   Head:  Normocephalic and atraumatic.   Right Ear: External ear normal.   Left Ear: External ear normal.   Nose: Nose normal.   Mouth/Throat: Oropharynx is clear and moist. No oropharyngeal exudate.   Eyes: Conjunctivae and EOM are normal. Pupils are equal, round, and reactive to light. Right eye exhibits no discharge. Left eye exhibits no discharge. No scleral icterus.   Neck: Normal range of motion. Neck supple. No JVD present. No thyromegaly present.   Cardiovascular: Normal rate, regular rhythm, normal heart sounds and intact distal pulses.  Exam reveals no gallop and no friction rub.    No murmur heard.  Pulmonary/Chest: Effort normal and breath sounds normal. No stridor. No respiratory distress. She has no wheezes. She has no rales. She exhibits no tenderness.   Abdominal: Soft. Bowel sounds are normal. She exhibits no distension. There is no tenderness. There is no rebound.   Musculoskeletal: Normal range of motion. She exhibits no edema or tenderness.   Lymphadenopathy:     She has no cervical adenopathy.   Neurological: She is alert and oriented to person, place, and time.   Skin: Skin is warm. No rash noted. She is not diaphoretic. No erythema.   Psychiatric: She has a normal mood and affect. Her behavior is normal.   Nursing note and vitals reviewed.      Assessment and Plan:       1. Benign essential HTN  - stable on current meds  - Lipid panel; Future  - Hemoglobin A1c; Future  - Comprehensive metabolic panel; Future    2. Chronic atrial fibrillation  - stable on coumadin  - Lipid panel; Future  - Hemoglobin A1c; Future  - Comprehensive metabolic panel; Future  - EKG 12-lead    3. Chronic diastolic heart failure  - stable; on lasix  - Lipid panel; Future  - Hemoglobin A1c; Future  - Comprehensive metabolic panel; Future    4. Chronic kidney disease, stage III (moderate)  - avoid nsaids  - Lipid panel; Future  - Hemoglobin A1c; Future  - Comprehensive metabolic panel; Future    5. Long term (current) use of  anticoagulants-warfarin  - follows with coumadin clinic  - Lipid panel; Future  - Hemoglobin A1c; Future  - Comprehensive metabolic panel; Future    6. Prediabetes  - diet controlled  - Lipid panel; Future  - Hemoglobin A1c; Future  - Comprehensive metabolic panel; Future    7. Impaired fasting glucose  - Hemoglobin A1c; Future        No Follow-up on file.

## 2017-03-31 NOTE — PROGRESS NOTES
"Jenniffer Jimenez presented for a  Medicare AWV and comprehensive Health Risk Assessment today. The following components were reviewed and updated:    · Medical history  · Family History  · Social history  · Allergies and Current Medications  · Health Risk Assessment  · Health Maintenance  · Care Team     ** See Completed Assessments for Annual Wellness Visit within the encounter summary.**       The following assessments were completed:  · Living Situation  · CAGE  · Depression Screening  · Timed Get Up and Go  · Whisper Test  · Cognitive Function Screening  · Nutrition Screening  · ADL Screening  · PAQ Screening    Vitals:    03/31/17 0736   BP: 120/60   Pulse: 86   Resp: 15   Temp: 98.5 °F (36.9 °C)   TempSrc: Oral   Weight: 81.6 kg (180 lb)   Height: 5' 6.5" (1.689 m)     Body mass index is 28.62 kg/(m^2).  Physical Exam   Constitutional: She is oriented to person, place, and time. She appears well-developed and well-nourished.   HENT:   Head: Normocephalic and atraumatic.   Right Ear: External ear normal.   Left Ear: External ear normal.   Hearing impaired with whispers   Eyes: No scleral icterus.   Cardiovascular: Normal rate and regular rhythm.    Pulmonary/Chest: Effort normal and breath sounds normal.   Abdominal: Soft. Bowel sounds are normal. There is no tenderness.   Musculoskeletal: She exhibits no edema.   Walks w cane   Neurological: She is alert and oriented to person, place, and time.   Skin: Skin is warm and dry.   Psychiatric: She has a normal mood and affect. Her behavior is normal. Judgment and thought content normal.   Nursing note and vitals reviewed.        Diagnoses and health risks identified today and associated recommendations/orders:    1. Arthritis  Stable-followed by PCP    2. Atherosclerosis of aorta  Stable- followed by cardiology    3. Balance problems  Stable-followed by PCP    4. Benign essential HTN  Stable- followed by cardiology    5. Chronic atrial fibrillation  Stable- followed by " cardiology    6. Chronic diastolic heart failure  Stable- followed by cardiology    7. Chronic kidney disease, stage III (moderate)  Stable-followed by PCP    8. Chronic rhinitis  Stable-followed by PCP    9. CRVO (central retinal vein occlusion) - Left Eye  Stable- followed by opthalmology    10. Exotropia of both eyes  Stable- followed by opthalmology    11. Facet arthropathy, lumbosacral  Stable-followed by PCP    12. Gait instability  Stable-followed by PCP    13. Sensorineural hearing loss (SNHL) of both ears  Stable- followed by ENT/audiology    14. History of strabismus surgery  Stable- followed by opthalmology    15. History of TIA (transient ischemic attack)  Stable-followed by PCP    16. 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 Long term (current) use of anticoagulants  Stable- followed by cardiology    19. Lumbar spinal stenosis  Stable-followed by PCP    20. Meniere's disease, unspecified laterality  Stable-followed by PCP & ENT    21. Osteoporosis  Stable-followed by PCP    22. Non morbid obesity due to excess calories  CHronic with gradual intentional weight loss.Reviewed current BMI & ideal BMI range. Encouraged weight loss,  diet and exercise. Followed by PCP.    23. Pseudophakia, both eyes  Stable- followed by opthalmology    24. Pure hypercholesterolemia  Stable- followed by cardiology    25. Primary osteoarthritis of right shoulder  Stable-followed by PCP    26. Encounter for preventive health examination  Assessments completed  Preventative health recommendations reviewed         Provided Bradley with a 5-10 year written screening schedule and personal prevention plan. Recommendations were developed using the USPSTF age appropriate recommendations. Education, counseling, and referrals were provided as needed. After Visit Summary printed and given to patient which  includes a list of additional screenings\tests needed.    Return in about 1 day (around 4/1/2017).    Kaila Aguilar NP

## 2017-03-31 NOTE — MR AVS SNAPSHOT
[facility]   [facility]  [place] [address]  Phone: [phone]  Fax: [phone]                  Jenniffer Jimenez   3/31/2017 7:30 AM   Office Visit    Description:  Female : 1932   Provider:  HRA, [facility] 1   Department:  [facility]           Reason for Visit     Health Risk Assessment           Diagnoses this Visit        Comments    Arthritis    -  Primary     Atherosclerosis of aorta         Balance problems         Benign essential HTN         Chronic atrial fibrillation         Chronic diastolic heart failure         Chronic kidney disease, stage III (moderate)         Chronic rhinitis         CRVO (central retinal vein occlusion)         Exotropia of both eyes         Facet arthropathy, lumbosacral         Gait instability         Sensorineural hearing loss (SNHL) of both ears         History of strabismus surgery         History of TIA (transient ischemic attack)         Left ventricular diastolic dysfunction with preserved systolic function         Hypermetropia of right eye         Long term (current) use of anticoagulants         Lumbar spinal stenosis         Meniere's disease, unspecified laterality         Osteoporosis         Non morbid obesity due to excess calories         Pseudophakia, both eyes         Pure hypercholesterolemia         Primary osteoarthritis of right shoulder         Encounter for preventive health examination                To Do List           Future Appointments        Provider Department Dept Phone    3/31/2017 8:15 AM KAMILA, ALLERGY INJECTION [facility] [phone]    3/31/2017 8:40 AM Rubi Young DO [facility] [phone]    2017 12:00 PM Candice Galarza, PharmD [facility] [phone]    2017 7:00 AM Rubi Young DO [facility] [phone]    2017 8:00 AM KAMILA ALLERGY INJECTION [facility]  [phone]      Goals (5 Years of Data)     None      Follow-Up and Disposition     Return in about 1 day (around 4/1/2017).      [facility]     [facility] - 24/7 Assistance  Unless otherwise directed by your provider, please contact [facility], our nurse care line that is available for 24/7 assistance.     Registered nurses in the [facility] provide: appointment scheduling, clinical advisement, health education, and other advisory services.  Call: [phone] (toll free)               Medications           Message regarding Medications     Verify the changes and/or additions to your medication regime listed below are the same as discussed with your clinician today.  If any of these changes or additions are incorrect, please notify your healthcare provider.        STOP taking these medications     hyoscyamine (LEVSIN/SL) 0.125 mg Subl Place 1 tablet (0.125 mg total) under the tongue every 4 (four) hours as needed.    ondansetron (ZOFRAN-ODT) 4 MG TbDL Take 1 tablet (4 mg total) by mouth every 8 (eight) hours as needed.           Verify that the below list of medications is an accurate representation of the medications you are currently taking.  If none reported, the list may be blank. If incorrect, please contact your healthcare provider. Carry this list with you in case of emergency.           Current Medications     aspirin (ECOTRIN) 81 MG EC tablet Take 81 mg by mouth once daily.    betamethasone valerate 0.1% (VALISONE) 0.1 % Lotn Apply topically 2 (two) times daily. qtts 3 AD Bid.    clindamycin (CLEOCIN T) 1 % lotion Use hs on face    fluticasone (FLONASE) 50 mcg/actuation nasal spray 1 spray by Each Nare route once daily.    furosemide (LASIX) 20 MG tablet Take 1 tablet (20 mg total) by mouth once daily.    glucosamine-chondroitin 500-400 mg tablet Take 1 tablet by mouth every morning.    peg 400-propylene glycol (SYSTANE) 0.4-0.3 % Drop Apply to eye daily as  "needed.     potassium chloride (MICRO-K) 10 MEQ CpSR Take 1 capsule (10 mEq total) by mouth once daily.    pravastatin (PRAVACHOL) 40 MG tablet Take 1 tablet (40 mg total) by mouth 2 (two) times daily.    vitamin D 1000 units Tab Take 2,000 Units by mouth once daily.     warfarin (COUMADIN) 3 MG tablet Take 1 tablet (3 mg total) by mouth Daily.           Clinical Reference Information           Your Vitals Were     BP Pulse Temp Resp Height Weight    120/60 86 98.5 °F (36.9 °C) (Oral) 15 5' 6.5" (1.689 m) 81.6 kg (180 lb)    Last Period BMI             (LMP Unknown) 28.62 kg/m2         Blood Pressure          Most Recent Value    BP  120/60      Allergies as of 3/31/2017     Bactrim [Sulfamethoxazole-trimethoprim]    Dramamine [Dimenhydrinate]    Tramadol    Beta-blockers (Beta-adrenergic Blocking Agts)    Phenergan [Promethazine]      Immunizations Administered on Date of Encounter - 3/31/2017     None      Instructions      Counseling and Referral of Other Preventative  (Italic type indicates deductible and co-insurance are waived)    Patient Name: Jenniffer Jimenez  Today's Date: 3/31/2017      SERVICE LIMITATIONS RECOMMENDATION    Vaccines    · Pneumococcal (once after 65)    · Influenza (annually)    · Hepatitis B (if medium/high risk)    · Prevnar 13      Hepatitis B medium/high risk factors:       - End-stage renal disease       - Hemophiliacs who received Factor VII or         IX concentrates       - Clients of institutions for the mentally             retarded       - Persons who live in the same house as          a HepB carrier       - Homosexual men       - Illicit injectable drug abusers     Pneumococcal: current     Influenza: current     Hepatitis B: declined     Prevnar 13: current    Mammogram (biennial age 50-74)  Annually (age 40 or over)  last 6/27/16-due June    Pap (up to age 70 and after 70 if unknown history or abnormal study last 10 years)    N/A         Colorectal cancer screening (to age " 75)    · Fecal occult blood test (annual)  · Flexible sigmoidoscopy (5y)  · Screening colonoscopy (10y)  · Barium enema   last 2009- she will discuss need with PCP     Diabetes self-management training (no USPSTF recommendations)  Requires referral by treating physician for patient with diabetes or renal disease. 10 hours of initial DSMT sessions of no less than 30 minutes each in a continuous 12-month period. 2 hours of follow-up DSMT in subsequent years.  N/A    Bone mass measurements (age 65 & older, biennial)  Requires diagnosis related to osteoporosis or estrogen deficiency. Biennial benefit unless patient has history of long-term glucocorticoid  last 6/27/16 due 2018    Glaucoma screening (no USPSTF recommendation)  Diabetes mellitus, family history   , age 50 or over    American, age 65 or over  current    Medical nutrition therapy for diabetes or renal disease (no recommended schedule)  Requires referral by treating physician for patient with diabetes or renal disease or kidney transplant within the past 3 years.  Can be provided in same year as diabetes self-management training (DSMT), and CMS recommends medical nutrition therapy take place after DSMT. Up to 3 hours for initial year and 2 hours in subsequent years.  N/A    Cardiovascular screening blood tests (every 5 years)  · Fasting lipid panel  Order as a panel if possible  current    Diabetes screening tests (at least every 3 years, Medicare covers annually or at 6-month intervals for prediabetic patients)  · Fasting blood sugar (FBS) or glucose tolerance test (GTT)  Patient must be diagnosed with one of the following:       - Hypertension       - Dyslipidemia       - Obesity (BMI 30kg/m2)       - Previous elevated impaired FBS or GTT       ... or any two of the following:       - Overweight (BMI 25 but <30)       - Family history of diabetes       - Age 65 or older       - History of gestational diabetes or birth of baby  weighing more than 9 pounds  current    Abdominal aortic aneurysm screening (once)  · Sonogram   Limited to patients who meet one of the following criteria:       - Men who are 65-75 years old and have smoked more than 100 cigarette in their lifetime       - Anyone with a family history of abdominal aortic aneurysm       - Anyone recommended for screening by the USPSTF  N/A    HIV screening (annually for increased risk patients)  · HIV-1 and HIV-2 by EIA, or ANAHI, rapid antibody test or oral mucosa transudate  Patients must be at increased risk for HIV infection per USPSTF guidelines or pregnant. Tests covered annually for patient at increased risk or as requested by the patient. Pregnant patients may receive up to 3 tests during pregnancy.  Risks discussed, screening is declined    Smoking cessation counseling (up to 8 sessions per year)  Patients must be asymptomatic of tobacco-related conditions to receive as a preventative service.  N/A    Subsequent annual wellness visit  At least 12 months since last AWV  Return in one year     The following information is provided to all patients.  This information is to help you find resources for any of the problems found today that may be affecting your health:                Living healthy guide: www.[facility].[place].[facility]      Understanding Diabetes: www.diabetes.org      Eating healthy: www.cdc.gov/healthyweight      [facility] home safety checklist: www.cdc.gov/steadi/patient.html      Agency on Aging: www.goea.[place].[facility]      Alcoholics anonymous (AA): www.aa.org      Physical Activity: www.nichelle.nih.gov/oc3zmra      Tobacco use: www.quitwithusla.org     Fall Prevention  Falls often occur due to slipping, tripping or losing your balance. Millions of people fall every year and injure themselves. Here are ways to reduce your risk of falling again.  · Think about your fall, was there anything that caused your fall that can be fixed, removed, or replaced?  · Make your home safe by  keeping walkways clear of objects you may trip over.  · Use non-slip pads under rugs. Do not use area rugs or small throw rugs.  · Use non-slip mats in bathtubs and showers.  · Install handrails and lights on staircases.  · Do not walk in poorly lit areas.  · Do not stand on chairs or wobbly ladders.  · Use caution when reaching overhead or looking upward. This position can cause a loss of balance.  · Be sure your shoes fit properly, have non-slip bottoms and are in good condition.   · Wear shoes both inside and out. Avoid going barefoot or wearing slippers.  · Be cautious when going up and down stairs, curbs, and when walking on uneven sidewalks.  · If your balance is poor, consider using a cane or walker.  · If your fall was related to alcohol use, stop or limit alcohol intake.   · If your fall was related to use of sleeping medicines, talk to your doctor about this. You may need to reduce your dosage at bedtime if you awaken during the night to go to the bathroom.    · To reduce the need for nighttime bathroom trips:  ¨ Avoid drinking fluids for several hours before going to bed  ¨ Empty your bladder before going to bed  ¨ Men can keep a urinal at the bedside  · Stay as active as you can. Balance, flexibility, strength, and endurance all come from exercise. They all play a role in preventing falls. Ask your healthcare provider which types of activity are right for you.  · Get your vision checked on a regular basis.  · If you have pets, know where they are before you stand up or walk so you don't trip over them.  · Use night lights.  Date Last Reviewed: 11/5/2015  © 3934-2933 [employer]. [address] [place] [address], [place] [address]. All rights reserved. This information is not intended as a substitute for professional medical care. Always follow your healthcare professional's instructions.        Preventing Falls: Moving Safely Using a Cane or Walker     Keep the cane away from your feet so you dont  trip.     A walking aid, such as a cane or walker, can help you stay more independent and avoid falls. Remember to keep your walking aid within easy reach when you're in a chair or in bed. And learn how to use it safely so you don't injure yourself. Be sure the cane or walker is the correct height. Hang your arm loosely at your side, and measure the distance from your wrist to the floor. The distance should be the same as the height of your cane or walker.  Using a cane  If you have a stronger side, hold the cane on the side of your stronger leg.  1. Get your balance.  2. Move the cane and your weaker leg forward.  3. Support your weight on both the cane and your weaker side.  4. Step with your stronger leg.  5. Start again from step 1.     If youre using a folding walker, be sure you know how to lock it open. Check that its locked open before each use.   Using a walker  1. Roll the walker (or lift it, if you're using one without wheels) forward about 12 inches.  2. Step forward with your weaker leg first.  3. Use the walker to help keep your balance.  4. Bring your other foot forward to the center of the walker.  5. Start again from step 1.  Helpful tips  · Check with your health care provider about the right walking aid to use. Ask about a walker with a seat attached.  · Check the tips of your cane or walker to make sure they have nonskid covers.  · Move slowly from room to room. Don't rush.  · Sit down to get dressed.  · Use a oma pack or backpack to keep your hands free.  · Get help for jobs that mean climbing, even on a stepstool.  · Do not try going up or down stairs using a walker.   Date Last Reviewed: 6/12/2015  © 9941-0361 [employer]. [address] [place] [address], [place] [address]. All rights reserved. This information is not intended as a substitute for professional medical care. Always follow your healthcare professional's instructions.             Language Assistance Services      ATTENTION: Language assistance services are available, free of charge. Please call [phone].      ATENCIÓN: Si habla jaquan, tiene a valle disposición servicios gratuitos de asistencia lingüística. Llame al [phone].     CHÚ Ý: N?u b?n nói Ti?ng Vi?t, có các d?ch v? h? tr? ngôn ng? mi?n phí dành cho b?n. G?i s? [phone].         [facility] complies with applicable Federal civil rights laws and does not discriminate on the basis of race, color, national origin, age, disability, or sex.

## 2017-04-01 LAB
ESTIMATED AVG GLUCOSE: 131 MG/DL
HBA1C MFR BLD HPLC: 6.2 %

## 2017-04-03 NOTE — PROGRESS NOTES
"Patient seen in clinic today (3/31/17) for an allergy injection.  After injection was given by Chel Sauer LPN, patient announced she was going downstairs to have labs drawn.  I advised the patient that she could not leave the floor for 30 minutes.  She stated that she "always" goes downstairs for labs while waiting the 30 minutes, and added that she been doing that for 15 years.  I then said it's been done incorrectly for 15 years.  I again advised her that she cannot leave the floor in the event of a potential reaction and that if she did she was leaving against medical advice. She stated that she has never had a reaction and asked if in 30 minutes someone was going to check on her.   Chel said she would.  I reminded Chel that she cannot leave the floor to check the patient as she has patients on the floor for which she is responsible.     Patient saw Dr. Del Rio in early 2016.  As it's been over a year I sent an e-mail to Dr. Oliver, Huong Burt and Dr. Castro suggesting the patient come to the clinic to meet with Dr. Del Rio and Dr. Castro to review immunotherapy.  "

## 2017-04-04 ENCOUNTER — TELEPHONE (OUTPATIENT)
Dept: ALLERGY | Facility: CLINIC | Age: 82
End: 2017-04-04

## 2017-04-04 ENCOUNTER — TELEPHONE (OUTPATIENT)
Dept: INTERNAL MEDICINE | Facility: CLINIC | Age: 82
End: 2017-04-04

## 2017-04-04 NOTE — TELEPHONE ENCOUNTER
----- Message from Jyoti Rajput sent at 4/4/2017  7:12 AM CDT -----  Contact: Home: [phone]   Please call her on cell phone today due to her not being at home with her test results. The cell number is  [phone] but she do not want this number listed in the system. After 3 pm she will be home then you can call  #.

## 2017-04-04 NOTE — TELEPHONE ENCOUNTER
I called Ms. Jimenez this morning to review policy on waiting 30 minutes after immunotherapy injections. We reviewed the events of last week that led to her running late and I apologized that some days we are not able to give her shots at the time scheduled due to other patient needs that come up unexpectedly, and also that because she is hard of hearing she sometimes doesn't hear her name being called. I also reviewed that the 30 minute wait is a policy because anaphylaxis after immunotherapy injections do happen and are life threatening. She feels that as long as she is in the medical building she should be ok. I reviewed that the allergy nurses an physicians are the ones most prepared to treat anaphylaxis, and that promptly treating reactions is associated with better outcomes. She would like us to review this policy, but agrees to follow it from now on unless it is changed.

## 2017-04-05 ENCOUNTER — TELEPHONE (OUTPATIENT)
Dept: INTERNAL MEDICINE | Facility: CLINIC | Age: 82
End: 2017-04-05

## 2017-04-05 NOTE — TELEPHONE ENCOUNTER
----- Message from Vania Cesar sent at 4/5/2017  8:46 AM CDT -----  Contact: self [phone] or cell # [phone]  Patient would like to get test results.  Name of test (lab, mammo, etc.):  Lab & EKG  Date of test:  03/31  Ordering provider: Dr. Young   Where was the test performed:  [facility]  Comments:  Pt still waiting on response back from Dr. Young doesn't want to talk to the nurse

## 2017-04-21 ENCOUNTER — ANTI-COAG VISIT (OUTPATIENT)
Dept: CARDIOLOGY | Facility: CLINIC | Age: 82
End: 2017-04-21
Payer: MEDICARE

## 2017-04-21 DIAGNOSIS — I48.20 CHRONIC ATRIAL FIBRILLATION: ICD-10-CM

## 2017-04-21 DIAGNOSIS — Z79.01 LONG TERM (CURRENT) USE OF ANTICOAGULANTS: Primary | ICD-10-CM

## 2017-04-21 LAB — INR PPP: 2.2 (ref 2–3)

## 2017-04-21 PROCEDURE — 99211 OFF/OP EST MAY X REQ PHY/QHP: CPT | Mod: 25,S$GLB,,

## 2017-04-21 PROCEDURE — 85610 PROTHROMBIN TIME: CPT | Mod: QW,S$GLB,,

## 2017-04-21 NOTE — PROGRESS NOTES
INR good. Pt denies changes. She has bruising on her arms. Pt reports she bumps into things regularly. No s/sx of bleeding. Continue maintenance dose and repeat INR next month while at [facility]

## 2017-04-22 ENCOUNTER — NURSE TRIAGE (OUTPATIENT)
Dept: ADMINISTRATIVE | Facility: CLINIC | Age: 82
End: 2017-04-22

## 2017-04-22 ENCOUNTER — OFFICE VISIT (OUTPATIENT)
Dept: INTERNAL MEDICINE | Facility: CLINIC | Age: 82
End: 2017-04-22
Payer: MEDICARE

## 2017-04-22 VITALS
DIASTOLIC BLOOD PRESSURE: 85 MMHG | SYSTOLIC BLOOD PRESSURE: 130 MMHG | HEART RATE: 84 BPM | BODY MASS INDEX: 29.97 KG/M2 | WEIGHT: 188.5 LBS | TEMPERATURE: 98 F

## 2017-04-22 DIAGNOSIS — I50.32 CHRONIC DIASTOLIC HEART FAILURE: ICD-10-CM

## 2017-04-22 DIAGNOSIS — I10 BENIGN ESSENTIAL HTN: ICD-10-CM

## 2017-04-22 DIAGNOSIS — N39.0 URINARY TRACT INFECTION WITHOUT HEMATURIA, SITE UNSPECIFIED: Primary | ICD-10-CM

## 2017-04-22 LAB
BILIRUB SERPL-MCNC: NORMAL MG/DL
BLOOD URINE, POC: NORMAL
COLOR, POC UA: YELLOW
GLUCOSE UR QL STRIP: NORMAL
KETONES UR QL STRIP: NORMAL
LEUKOCYTE ESTERASE URINE, POC: NORMAL
NITRITE, POC UA: NORMAL
PH, POC UA: 6
PROTEIN, POC: NORMAL
SPECIFIC GRAVITY, POC UA: 1
UROBILINOGEN, POC UA: NORMAL

## 2017-04-22 PROCEDURE — 81001 URINALYSIS AUTO W/SCOPE: CPT | Mod: S$GLB,,, | Performed by: INTERNAL MEDICINE

## 2017-04-22 PROCEDURE — 99213 OFFICE O/P EST LOW 20 MIN: CPT | Mod: S$GLB,,, | Performed by: INTERNAL MEDICINE

## 2017-04-22 PROCEDURE — 3075F SYST BP GE 130 - 139MM HG: CPT | Mod: S$GLB,,, | Performed by: INTERNAL MEDICINE

## 2017-04-22 PROCEDURE — 1159F MED LIST DOCD IN RCRD: CPT | Mod: S$GLB,,, | Performed by: INTERNAL MEDICINE

## 2017-04-22 PROCEDURE — 99999 PR PBB SHADOW E&M-EST. PATIENT-LVL III: CPT | Mod: PBBFAC,,, | Performed by: INTERNAL MEDICINE

## 2017-04-22 PROCEDURE — 87077 CULTURE AEROBIC IDENTIFY: CPT

## 2017-04-22 PROCEDURE — 1160F RVW MEDS BY RX/DR IN RCRD: CPT | Mod: S$GLB,,, | Performed by: INTERNAL MEDICINE

## 2017-04-22 PROCEDURE — 87086 URINE CULTURE/COLONY COUNT: CPT

## 2017-04-22 PROCEDURE — 3079F DIAST BP 80-89 MM HG: CPT | Mod: S$GLB,,, | Performed by: INTERNAL MEDICINE

## 2017-04-22 PROCEDURE — 87088 URINE BACTERIA CULTURE: CPT

## 2017-04-22 PROCEDURE — 99499 UNLISTED E&M SERVICE: CPT | Mod: S$GLB,,, | Performed by: INTERNAL MEDICINE

## 2017-04-22 PROCEDURE — 87186 SC STD MICRODIL/AGAR DIL: CPT

## 2017-04-22 RX ORDER — CIPROFLOXACIN 500 MG/1
500 TABLET ORAL EVERY 12 HOURS
Qty: 14 TABLET | Refills: 0 | Status: SHIPPED | OUTPATIENT
Start: 2017-04-22 | End: 2017-04-29

## 2017-04-22 NOTE — TELEPHONE ENCOUNTER
"  Reason for Disposition   Urinating more frequently than usual (i.e., frequency)    Answer Assessment - Initial Assessment Questions  1. SYMPTOM: "What's the main symptom you're concerned about?" (e.g., frequency, incontinence)      Frequency, urgency and bladder discomfort   2. ONSET: "When did the  ________  start?"      Two nights ago   3. PAIN: "Is there any pain?" If so, ask: "How bad is it?" (Scale: 1-10; mild, moderate, severe)      "just severe discomfort"  4. CAUSE: "What do you think is causing the symptoms?"      Unsure, similiar to past UTI   5. OTHER SYMPTOMS: "Do you have any other symptoms?" (e.g., fever, flank pain, blood in urine, pain with urination)      No   6. PREGNANCY: "Is there any chance you are pregnant?" "When was your last menstrual period?"      --Age consideration--    Protocols used: ST URINARY SYMPTOMS-A-    "

## 2017-04-25 LAB — BACTERIA UR CULT: NORMAL

## 2017-04-25 NOTE — PROGRESS NOTES
Subjective:       Patient ID: Jenniffer Jimenez is a 85 y.o. female.    Chief Complaint: Urinary Tract Infection    HPI   The patient has had symptoms of increased urinary frequency and urgency for the past several days.  She is concerned that she may have a urinary tract infection.  She denies having any flank pain, chills, or fever.  She relates that she has hypertension and has been treated for CHF.  Review of Systems   Constitutional: Negative for chills and fever.   Respiratory: Negative for shortness of breath.    Cardiovascular: Negative for chest pain and leg swelling.   Genitourinary: Positive for frequency and urgency. Negative for flank pain and hematuria.       Objective:      Physical Exam   Constitutional: She is oriented to person, place, and time. She appears well-developed and well-nourished. No distress.   HENT:   Head: Normocephalic and atraumatic.   Cardiovascular:   An irregular rhythm is present.   Pulmonary/Chest: Effort normal and breath sounds normal. No respiratory distress.   Abdominal: Soft. Bowel sounds are normal.   Mild suprapubic tenderness is present.   Musculoskeletal:   No CVA percussion tenderness is present.   Neurological: She is alert and oriented to person, place, and time.   Skin: Skin is warm and dry. No rash noted.   Nursing note and vitals reviewed.    Dipstick urinalysis showed 2+ leukocytes but it was negative for blood negative for nitrites.  Assessment:       1. Urinary tract infection without hematuria, site unspecified    2. Benign essential HTN    3. Chronic diastolic heart failure        Plan:           Jenniffer was seen today for urinary tract infection.  Urine will be sent for culture.  Ciprofloxacin will be ordered.  The patient has been encouraged to increase oral water intake.  She may return to clinic as needed.    Diagnoses and all orders for this visit:    Urinary tract infection without hematuria, site unspecified  -     Urine culture; Future  -     POCT  urinalysis, dipstick or tablet reag  -     Urine culture    Benign essential HTN    Chronic diastolic heart failure    Other orders  -     ciprofloxacin HCl (CIPRO) 500 MG tablet; Take 1 tablet (500 mg total) by mouth every 12 (twelve) hours.

## 2017-05-01 ENCOUNTER — TELEPHONE (OUTPATIENT)
Dept: ALLERGY | Facility: CLINIC | Age: 82
End: 2017-05-01

## 2017-05-01 NOTE — TELEPHONE ENCOUNTER
Patient did not an  receive allergy injection on 04/28/2017 . Vaccine unavailable. Instructed patient that staff would call her as soon as vaccines where available. NAHUM

## 2017-05-05 ENCOUNTER — CLINICAL SUPPORT (OUTPATIENT)
Dept: INTERNAL MEDICINE | Facility: CLINIC | Age: 82
End: 2017-05-05
Payer: MEDICARE

## 2017-05-05 DIAGNOSIS — J31.0 CHRONIC RHINITIS: ICD-10-CM

## 2017-05-05 PROCEDURE — 95115 IMMUNOTHERAPY ONE INJECTION: CPT | Mod: S$GLB,,, | Performed by: FAMILY MEDICINE

## 2017-05-05 PROCEDURE — 99499 UNLISTED E&M SERVICE: CPT | Mod: S$GLB,,, | Performed by: FAMILY MEDICINE

## 2017-05-05 NOTE — PROGRESS NOTES
Patient in for allergy injection. Stated no new meds, no problems with last injection.    0.35 ml of  1:1   given SQ in Right Upper arm. Tolerated well.     Patient assessed after 30 minutes . No reaction noted.     Patient voice no complaints

## 2017-05-11 NOTE — PROGRESS NOTES
Patient called to report that she woke up this am and noticed dry blood on her L-arm, thinks her cat may have scratched her during the night--not really sure, states she cleaned her arm and it's not bleeding now, reports no other bleeding issues, no missed doses, no new meds., next INR is due 5/26, call back # [phone]

## 2017-05-12 ENCOUNTER — CLINICAL SUPPORT (OUTPATIENT)
Dept: INTERNAL MEDICINE | Facility: CLINIC | Age: 82
End: 2017-05-12
Payer: MEDICARE

## 2017-05-12 DIAGNOSIS — J30.9 ALLERGIC RHINITIS, UNSPECIFIED ALLERGIC RHINITIS TRIGGER, UNSPECIFIED RHINITIS SEASONALITY: ICD-10-CM

## 2017-05-12 PROCEDURE — 95115 IMMUNOTHERAPY ONE INJECTION: CPT | Mod: S$GLB,,, | Performed by: FAMILY MEDICINE

## 2017-05-12 PROCEDURE — 99499 UNLISTED E&M SERVICE: CPT | Mod: S$GLB,,, | Performed by: FAMILY MEDICINE

## 2017-05-12 NOTE — PROGRESS NOTES
Patient in for allergy injection. Stated no new meds, no problems with last injection.     0.40 ml of 1 :1   given SQ in  Left  Upper arm. Tolerated well.     Patient assessed after 30 minutes . No reaction noted.     Patient voice no complaints

## 2017-05-15 ENCOUNTER — PATIENT MESSAGE (OUTPATIENT)
Dept: INTERNAL MEDICINE | Facility: CLINIC | Age: 82
End: 2017-05-15

## 2017-05-16 ENCOUNTER — TELEPHONE (OUTPATIENT)
Dept: INTERNAL MEDICINE | Facility: CLINIC | Age: 82
End: 2017-05-16
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
## 2017-05-16 NOTE — TELEPHONE ENCOUNTER
Spoke to pt and informed because of rash she would have to be evaluated in the clinic, she verbalized understanding. Offered apt with another physician for tomorrow, pt refused and booked for Friday.

## 2017-05-19 ENCOUNTER — CLINICAL SUPPORT (OUTPATIENT)
Dept: INTERNAL MEDICINE | Facility: CLINIC | Age: 82
End: 2017-05-19
Payer: MEDICARE

## 2017-05-19 ENCOUNTER — CLINICAL SUPPORT (OUTPATIENT)
Dept: CARDIOLOGY | Facility: CLINIC | Age: 82
End: 2017-05-19
Payer: MEDICARE

## 2017-05-19 ENCOUNTER — OFFICE VISIT (OUTPATIENT)
Dept: INTERNAL MEDICINE | Facility: CLINIC | Age: 82
End: 2017-05-19
Payer: MEDICARE

## 2017-05-19 VITALS
DIASTOLIC BLOOD PRESSURE: 68 MMHG | WEIGHT: 191.13 LBS | HEIGHT: 66 IN | SYSTOLIC BLOOD PRESSURE: 130 MMHG | RESPIRATION RATE: 12 BRPM | BODY MASS INDEX: 30.72 KG/M2 | HEART RATE: 80 BPM

## 2017-05-19 DIAGNOSIS — I48.20 CHRONIC ATRIAL FIBRILLATION: ICD-10-CM

## 2017-05-19 DIAGNOSIS — N39.0 URINARY TRACT INFECTION WITHOUT HEMATURIA, SITE UNSPECIFIED: ICD-10-CM

## 2017-05-19 DIAGNOSIS — B37.9 CANDIDIASIS: ICD-10-CM

## 2017-05-19 DIAGNOSIS — I50.32 CHRONIC DIASTOLIC HEART FAILURE: ICD-10-CM

## 2017-05-19 DIAGNOSIS — R21 RASH: Primary | ICD-10-CM

## 2017-05-19 LAB
BILIRUB UR QL STRIP: NEGATIVE
CLARITY UR REFRACT.AUTO: CLEAR
COLOR UR AUTO: COLORLESS
ESTIMATED PA SYSTOLIC PRESSURE: 36.3
GLUCOSE UR QL STRIP: NEGATIVE
HGB UR QL STRIP: NEGATIVE
KETONES UR QL STRIP: NEGATIVE
LEUKOCYTE ESTERASE UR QL STRIP: NEGATIVE
MITRAL VALVE REGURGITATION: NORMAL
NITRITE UR QL STRIP: NEGATIVE
PH UR STRIP: 7 [PH] (ref 5–8)
PROT UR QL STRIP: NEGATIVE
RETIRED EF AND QEF - SEE NOTES: 55 (ref 55–65)
SP GR UR STRIP: 1 (ref 1–1.03)
TRICUSPID VALVE REGURGITATION: NORMAL
URN SPEC COLLECT METH UR: ABNORMAL
UROBILINOGEN UR STRIP-ACNC: NEGATIVE EU/DL

## 2017-05-19 PROCEDURE — 3078F DIAST BP <80 MM HG: CPT | Mod: S$GLB,,, | Performed by: INTERNAL MEDICINE

## 2017-05-19 PROCEDURE — 87086 URINE CULTURE/COLONY COUNT: CPT

## 2017-05-19 PROCEDURE — 81003 URINALYSIS AUTO W/O SCOPE: CPT

## 2017-05-19 PROCEDURE — 1126F AMNT PAIN NOTED NONE PRSNT: CPT | Mod: S$GLB,,, | Performed by: INTERNAL MEDICINE

## 2017-05-19 PROCEDURE — 3075F SYST BP GE 130 - 139MM HG: CPT | Mod: S$GLB,,, | Performed by: INTERNAL MEDICINE

## 2017-05-19 PROCEDURE — 1159F MED LIST DOCD IN RCRD: CPT | Mod: S$GLB,,, | Performed by: INTERNAL MEDICINE

## 2017-05-19 PROCEDURE — 93306 TTE W/DOPPLER COMPLETE: CPT | Mod: S$GLB,,, | Performed by: INTERNAL MEDICINE

## 2017-05-19 PROCEDURE — 1160F RVW MEDS BY RX/DR IN RCRD: CPT | Mod: S$GLB,,, | Performed by: INTERNAL MEDICINE

## 2017-05-19 PROCEDURE — 99999 PR PBB SHADOW E&M-EST. PATIENT-LVL III: CPT | Mod: PBBFAC,,, | Performed by: INTERNAL MEDICINE

## 2017-05-19 PROCEDURE — 99214 OFFICE O/P EST MOD 30 MIN: CPT | Mod: S$GLB,,, | Performed by: INTERNAL MEDICINE

## 2017-05-19 RX ORDER — KETOCONAZOLE 20 MG/G
CREAM TOPICAL 2 TIMES DAILY
Qty: 1 TUBE | Refills: 1 | Status: SHIPPED | OUTPATIENT
Start: 2017-05-19 | End: 2018-04-06

## 2017-05-19 NOTE — PROGRESS NOTES
Subjective:       Patient ID: Jenniffer Jimenez is a 85 y.o. female.    Chief Complaint: Rash (buttocks) and Urinary Frequency    Patient is a 85 y.o.female who presents today for follow up. She had a rash on her buttock for two weeks but it is since improving. She says it is red and itchy. She also had a urine infection that was treated. She continues to complain of urine frequency.    Review of Systems   Constitutional: Negative for appetite change, chills, diaphoresis, fatigue and fever.   HENT: Negative for congestion, dental problem, ear discharge, ear pain, hearing loss, postnasal drip, sinus pressure and sore throat.    Eyes: Negative for discharge, redness and itching.   Respiratory: Negative for cough, chest tightness, shortness of breath and wheezing.    Cardiovascular: Negative for chest pain, palpitations and leg swelling.   Gastrointestinal: Negative for abdominal pain, constipation, diarrhea, nausea and vomiting.   Endocrine: Negative for cold intolerance and heat intolerance.   Genitourinary: Positive for frequency. Negative for difficulty urinating, hematuria and urgency.   Musculoskeletal: Negative for arthralgias, back pain, gait problem, myalgias and neck pain.   Skin: Positive for rash. Negative for color change.   Neurological: Negative for dizziness, syncope and headaches.   Hematological: Negative for adenopathy.   Psychiatric/Behavioral: Negative for behavioral problems and sleep disturbance. The patient is not nervous/anxious.        Objective:      Physical Exam   Constitutional: She is oriented to person, place, and time. She appears well-developed and well-nourished. No distress.   HENT:   Head: Normocephalic and atraumatic.   Right Ear: External ear normal.   Left Ear: External ear normal.   Eyes: Conjunctivae and EOM are normal. Pupils are equal, round, and reactive to light. Right eye exhibits no discharge. Left eye exhibits no discharge. No scleral icterus.   Neck: Normal range of  motion. Neck supple. No JVD present. No thyromegaly present.   Cardiovascular: Normal rate, regular rhythm, normal heart sounds and intact distal pulses.  Exam reveals no gallop and no friction rub.    No murmur heard.  Pulmonary/Chest: Effort normal and breath sounds normal. No stridor. No respiratory distress. She has no wheezes. She has no rales. She exhibits no tenderness.   Abdominal: Soft. Bowel sounds are normal. She exhibits no distension. There is no tenderness. There is no rebound.   Musculoskeletal: Normal range of motion. She exhibits no edema or tenderness.   Lymphadenopathy:     She has no cervical adenopathy.   Neurological: She is alert and oriented to person, place, and time.   Skin: Skin is warm. Rash noted. She is not diaphoretic. No erythema.   Psychiatric: She has a normal mood and affect. Her behavior is normal.   Nursing note and vitals reviewed.      Assessment and Plan:       1. Rash  - ketoconazole (NIZORAL) 2 % cream; Apply topically 2 (two) times daily.  Dispense: 1 Tube; Refill: 1    2. Candidiasis  - bathe after working out on bicycle  - ketoconazole (NIZORAL) 2 % cream; Apply topically 2 (two) times daily.  Dispense: 1 Tube; Refill: 1    3. Urinary tract infection without hematuria, site unspecified  - increase fluids  - Urinalysis  - Urine culture    Notify clinic if symptoms change, worsen or do not improve        No Follow-up on file.

## 2017-05-19 NOTE — PROGRESS NOTES
Patient in for allergy injection. Stated no new meds, no problems with last injection.    0.45  ml of 1: 1   given SQ in Left Upper  arm. Tolerated well.     Patient assessed after 30 minutes . No reaction noted.     Patient voice no complaints

## 2017-05-20 LAB
BACTERIA UR CULT: NORMAL
BACTERIA UR CULT: NORMAL

## 2017-05-26 ENCOUNTER — ANTI-COAG VISIT (OUTPATIENT)
Dept: CARDIOLOGY | Facility: CLINIC | Age: 82
End: 2017-05-26
Payer: MEDICARE

## 2017-05-26 ENCOUNTER — LAB VISIT (OUTPATIENT)
Dept: LAB | Facility: HOSPITAL | Age: 82
End: 2017-05-26
Attending: INTERNAL MEDICINE
Payer: MEDICARE

## 2017-05-26 ENCOUNTER — CLINICAL SUPPORT (OUTPATIENT)
Dept: INTERNAL MEDICINE | Facility: CLINIC | Age: 82
End: 2017-05-26
Payer: MEDICARE

## 2017-05-26 ENCOUNTER — OFFICE VISIT (OUTPATIENT)
Dept: CARDIOLOGY | Facility: CLINIC | Age: 82
End: 2017-05-26
Payer: MEDICARE

## 2017-05-26 VITALS
DIASTOLIC BLOOD PRESSURE: 70 MMHG | HEIGHT: 66 IN | HEART RATE: 76 BPM | SYSTOLIC BLOOD PRESSURE: 136 MMHG | BODY MASS INDEX: 29.95 KG/M2 | WEIGHT: 186.38 LBS

## 2017-05-26 DIAGNOSIS — I48.20 CHRONIC ATRIAL FIBRILLATION: ICD-10-CM

## 2017-05-26 DIAGNOSIS — J30.9 ALLERGIC RHINITIS, UNSPECIFIED ALLERGIC RHINITIS TRIGGER, UNSPECIFIED RHINITIS SEASONALITY: ICD-10-CM

## 2017-05-26 DIAGNOSIS — I50.32 CHRONIC DIASTOLIC HEART FAILURE: ICD-10-CM

## 2017-05-26 DIAGNOSIS — I48.20 CHRONIC ATRIAL FIBRILLATION: Primary | ICD-10-CM

## 2017-05-26 DIAGNOSIS — E78.00 PURE HYPERCHOLESTEROLEMIA: ICD-10-CM

## 2017-05-26 DIAGNOSIS — I10 ESSENTIAL HYPERTENSION: ICD-10-CM

## 2017-05-26 DIAGNOSIS — Z79.01 LONG TERM (CURRENT) USE OF ANTICOAGULANTS: Primary | ICD-10-CM

## 2017-05-26 LAB
ANION GAP SERPL CALC-SCNC: 14 MMOL/L
BUN SERPL-MCNC: 22 MG/DL
CALCIUM SERPL-MCNC: 10 MG/DL
CHLORIDE SERPL-SCNC: 103 MMOL/L
CO2 SERPL-SCNC: 25 MMOL/L
CREAT SERPL-MCNC: 1 MG/DL
EST. GFR  (AFRICAN AMERICAN): 59.4 ML/MIN/1.73 M^2
EST. GFR  (NON AFRICAN AMERICAN): 51.5 ML/MIN/1.73 M^2
GLUCOSE SERPL-MCNC: 111 MG/DL
INR PPP: 2.2 (ref 2–3)
POTASSIUM SERPL-SCNC: 4.5 MMOL/L
SODIUM SERPL-SCNC: 142 MMOL/L

## 2017-05-26 PROCEDURE — 1126F AMNT PAIN NOTED NONE PRSNT: CPT | Mod: S$GLB,,, | Performed by: INTERNAL MEDICINE

## 2017-05-26 PROCEDURE — 99499 UNLISTED E&M SERVICE: CPT | Mod: S$GLB,,, | Performed by: INTERNAL MEDICINE

## 2017-05-26 PROCEDURE — 99213 OFFICE O/P EST LOW 20 MIN: CPT | Mod: S$GLB,,, | Performed by: INTERNAL MEDICINE

## 2017-05-26 PROCEDURE — 99999 PR PBB SHADOW E&M-EST. PATIENT-LVL I: CPT | Mod: PBBFAC,,,

## 2017-05-26 PROCEDURE — 95115 IMMUNOTHERAPY ONE INJECTION: CPT | Mod: S$GLB,,, | Performed by: FAMILY MEDICINE

## 2017-05-26 PROCEDURE — 99499 UNLISTED E&M SERVICE: CPT | Mod: S$GLB,,, | Performed by: FAMILY MEDICINE

## 2017-05-26 PROCEDURE — 1159F MED LIST DOCD IN RCRD: CPT | Mod: S$GLB,,, | Performed by: INTERNAL MEDICINE

## 2017-05-26 PROCEDURE — 99999 PR PBB SHADOW E&M-EST. PATIENT-LVL III: CPT | Mod: PBBFAC,,, | Performed by: INTERNAL MEDICINE

## 2017-05-26 PROCEDURE — 85610 PROTHROMBIN TIME: CPT | Mod: QW,S$GLB,,

## 2017-05-26 NOTE — PATIENT INSTRUCTIONS
If you like you can do interval training while exercising - accelerate or turn up the resistance for about 1/2 a minute to a minute every few minutes.    OK to skip Lasix (furosemide) for a day but not for 2-3 days  Continue to stay away from salt

## 2017-05-26 NOTE — PROGRESS NOTES
Patient has bruising on her arms from use. Patient is stable on this dose. She will continue this dose until follow-up. I advised her to contact us with any changes or problems.

## 2017-05-26 NOTE — PROGRESS NOTES
Subjective:   Patient ID:  Jenniffer Jimenez is a 85 y.o. 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 She used to follow w Dr. Sommers in Cardiology.  Shortness of breath has resolved. She exercises 60 min on a stationary bicycle.  She is urinating 7 times or more during the day w 20 mg of furosemide. Planning a trip to [place] by train.  She needed much convincing to resume an anticoagulant. She takes warfarin.      Review of Systems   Constitution: Negative for malaise/fatigue, weight gain and weight loss.   Cardiovascular: Negative for chest pain, dyspnea on exertion, leg swelling and palpitations.   Hematologic/Lymphatic: Does not bruise/bleed easily.   Musculoskeletal: Negative for muscle cramps.   Gastrointestinal: Negative for melena.       Current Outpatient Prescriptions   Medication Sig    aspirin (ECOTRIN) 81 MG EC tablet Take 81 mg by mouth once daily.    betamethasone valerate 0.1% (VALISONE) 0.1 % Lotn Apply topically 2 (two) times daily. qtts 3 AD Bid.    clindamycin (CLEOCIN T) 1 % lotion Use hs on face    fluticasone (FLONASE) 50 mcg/actuation nasal spray 1 spray by Each Nare route once daily.    furosemide (LASIX) 20 MG tablet Take 1 tablet (20 mg total) by mouth once daily.    ketoconazole (NIZORAL) 2 % cream Apply topically 2 (two) times daily.    peg 400-propylene glycol (SYSTANE) 0.4-0.3 % Drop Apply to eye daily as needed.     potassium chloride (MICRO-K) 10 MEQ CpSR Take 1 capsule (10 mEq total) by mouth once daily.    pravastatin (PRAVACHOL) 40 MG tablet Take 1 tablet (40 mg total) by mouth 2 (two) times daily. (Patient taking differently: Take 40 mg by mouth once daily. )    SAFFLOWER OIL/LINOLEIC ACID,CO (CLA ORAL) Take 1 capsule by mouth once daily at 6am.    vitamin D 1000  "units Tab Take 2,000 Units by mouth once daily.     warfarin (COUMADIN) 3 MG tablet Take 1 tablet (3 mg total) by mouth Daily. (Patient taking differently: Take 3 mg by mouth. 1.5 tablets 5 days per week, 1 tablet 2 days per week, followed by Coumadin clinic)         Social History   Substance Use Topics    Smoking status: Former Smoker     Types: Cigarettes     Quit date: 10/29/1982    Smokeless tobacco: Former User    Alcohol use 0.0 oz/week      Comment: 1 -2 glasses since October         Objective:     Physical Exam   Constitutional: She is oriented to person, place, and time. She appears well-developed and well-nourished.   /70   Pulse 76   Ht 5' 6" (1.676 m)   Wt 84.5 kg (186 lb 6.4 oz)   LMP  (LMP Unknown)   BMI 30.09 kg/m²      Neck: No JVD present.   Cardiovascular: Normal rate.  An irregularly irregular rhythm present.   No murmur heard.  Pulmonary/Chest: She has no decreased breath sounds. She has no wheezes. She has no rales. Chest wall is not dull to percussion.   Abdominal: Soft. Normal appearance. There is no splenomegaly or hepatomegaly. There is no tenderness.   Musculoskeletal:        Right lower leg: She exhibits no edema.        Left lower leg: She exhibits no edema.   Neurological: She is alert and oriented to person, place, and time.   Skin: Skin is warm. No bruising noted. Nails show no clubbing.   Psychiatric: Her speech is normal and behavior is normal. Cognition and memory are normal.         Lab Results   Component Value Date    CHOL 172 03/31/2017    HDL 54 03/31/2017    LDLCALC 96.6 03/31/2017    TRIG 107 03/31/2017    CHOLHDL 31.4 03/31/2017     Lab Results   Component Value Date     03/31/2017    CREATININE 1.0 03/31/2017    BUN 8 03/31/2017     03/31/2017    K 4.7 03/31/2017     03/31/2017    CO2 25 03/31/2017     Lab Results   Component Value Date    ALT 12 03/31/2017    AST 19 03/31/2017    ALKPHOS 109 03/31/2017    BILITOT 0.8 03/31/2017 "         Assessment:     1. Chronic atrial fibrillation    2. Chronic diastolic heart failure    3. Essential hypertension    4. Pure hypercholesterolemia        Plan:       Chronic atrial fibrillation  -     EKG 12-lead; Future; Expected date: 11/26/2017  -     Hemoglobin; Future; Expected date: 11/26/2017  -     Hematocrit; Future; Expected date: 11/26/2017    Chronic diastolic heart failure  -     Basic metabolic pane today   Continue salt restriction  -     Basic metabolic panel; Future; Expected date: 11/26/2017    Essential hypertension    Pure hypercholesterolemia  -     ALT (SGPT); Future; Expected date: 11/26/2017  -     Lipid panel; Future; Expected date: 11/26/2017  -     AST (SGOT); Future; Expected date: 11/26/2017     RTC 11/17.

## 2017-05-26 NOTE — PROGRESS NOTES
Patient in for allergy injection. Stated no new meds, no problems with last injection.    0.50  ml of 1:1   given SQ in Left Upper  arm. Tolerated well.     Patient assessed after 30 minutes . No reaction noted.     Patient voice no complaints

## 2017-05-30 ENCOUNTER — TELEPHONE (OUTPATIENT)
Dept: INTERNAL MEDICINE | Facility: CLINIC | Age: 82
End: 2017-05-30

## 2017-05-30 DIAGNOSIS — Z12.31 VISIT FOR SCREENING MAMMOGRAM: Primary | ICD-10-CM

## 2017-05-30 NOTE — TELEPHONE ENCOUNTER
----- Message from John Morales sent at 5/30/2017  9:26 AM CDT -----  Contact: Patient came by - [phone]  JOSE G Elizabeth,    Patient came by to speak to you in reference to a referral for her mammogram.  Patient ask once the referral is in, could she have her mammogram scheduled on [id number] because she's coming in for an allergy appt.  I inform her when its in, I'll ask you just to make a note to Paige to see if [id number] available and Paige will give her a call, if not she may change her allergy appt.    Thanks Tatyana

## 2017-06-20 ENCOUNTER — PATIENT MESSAGE (OUTPATIENT)
Dept: CARDIOLOGY | Facility: CLINIC | Age: 82
End: 2017-06-20

## 2017-06-28 ENCOUNTER — CLINICAL SUPPORT (OUTPATIENT)
Dept: INTERNAL MEDICINE | Facility: CLINIC | Age: 82
End: 2017-06-28
Payer: MEDICARE

## 2017-06-28 ENCOUNTER — HOSPITAL ENCOUNTER (OUTPATIENT)
Dept: RADIOLOGY | Facility: HOSPITAL | Age: 82
Discharge: HOME | End: 2017-06-28
Attending: INTERNAL MEDICINE
Payer: MEDICARE

## 2017-06-28 DIAGNOSIS — Z12.31 VISIT FOR SCREENING MAMMOGRAM: ICD-10-CM

## 2017-06-28 DIAGNOSIS — J30.89 NON-SEASONAL ALLERGIC RHINITIS, UNSPECIFIED ALLERGIC RHINITIS TRIGGER: ICD-10-CM

## 2017-06-28 PROCEDURE — 99499 UNLISTED E&M SERVICE: CPT | Mod: S$GLB,,, | Performed by: FAMILY MEDICINE

## 2017-06-28 PROCEDURE — 77067 SCR MAMMO BI INCL CAD: CPT | Mod: 26,,, | Performed by: RADIOLOGY

## 2017-06-28 PROCEDURE — 95115 IMMUNOTHERAPY ONE INJECTION: CPT | Mod: S$GLB,,, | Performed by: FAMILY MEDICINE

## 2017-06-28 PROCEDURE — 77063 BREAST TOMOSYNTHESIS BI: CPT | Mod: 26,,, | Performed by: RADIOLOGY

## 2017-07-07 ENCOUNTER — ANTI-COAG VISIT (OUTPATIENT)
Dept: CARDIOLOGY | Facility: CLINIC | Age: 82
End: 2017-07-07
Payer: MEDICARE

## 2017-07-07 DIAGNOSIS — Z79.01 LONG TERM (CURRENT) USE OF ANTICOAGULANTS: Primary | ICD-10-CM

## 2017-07-07 DIAGNOSIS — I48.20 CHRONIC ATRIAL FIBRILLATION: ICD-10-CM

## 2017-07-07 LAB — INR PPP: 1.8 (ref 2–3)

## 2017-07-07 PROCEDURE — 85610 PROTHROMBIN TIME: CPT | Mod: QW,S$GLB,, | Performed by: PHARMACIST

## 2017-07-07 PROCEDURE — 99211 OFF/OP EST MAY X REQ PHY/QHP: CPT | Mod: 25,S$GLB,, | Performed by: PHARMACIST

## 2017-07-07 NOTE — PROGRESS NOTES
Patient presents with her usual bruising on her arms. Otherwise, Patient denies any changes in diet, medications, or health that would effect warfarin therapy.  INR usually therapeutic on this dose. Patient/caretaker advised by student, Alyson. I have reviewed the student's initial findings and agree with their assessment.  I have personally spoken with and assessed the patient in clinic to devise care plan.

## 2017-07-12 ENCOUNTER — TELEPHONE (OUTPATIENT)
Dept: INTERNAL MEDICINE | Facility: CLINIC | Age: 82
End: 2017-07-12

## 2017-07-26 ENCOUNTER — CLINICAL SUPPORT (OUTPATIENT)
Dept: INTERNAL MEDICINE | Facility: CLINIC | Age: 82
End: 2017-07-26
Payer: MEDICARE

## 2017-07-26 DIAGNOSIS — J30.89 NON-SEASONAL ALLERGIC RHINITIS DUE TO FUNGAL SPORES, UNSPECIFIED CHRONICITY: ICD-10-CM

## 2017-07-26 PROCEDURE — 99499 UNLISTED E&M SERVICE: CPT | Mod: S$GLB,,, | Performed by: ALLERGY & IMMUNOLOGY

## 2017-07-26 PROCEDURE — 95115 IMMUNOTHERAPY ONE INJECTION: CPT | Mod: S$GLB,,, | Performed by: FAMILY MEDICINE

## 2017-07-26 NOTE — PROGRESS NOTES
Patient was here for allergy injections.  No new meds, no problems with last injections.    0.5mL of 1:1 Red given SQ in Upper Left arm.  Tolerated well.  0 reaction at injection site.  No complaints voiced.

## 2017-07-28 ENCOUNTER — ANTI-COAG VISIT (OUTPATIENT)
Dept: CARDIOLOGY | Facility: CLINIC | Age: 82
End: 2017-07-28
Payer: MEDICARE

## 2017-07-28 DIAGNOSIS — Z79.01 LONG TERM (CURRENT) USE OF ANTICOAGULANTS: Primary | ICD-10-CM

## 2017-07-28 DIAGNOSIS — I48.20 CHRONIC ATRIAL FIBRILLATION: ICD-10-CM

## 2017-07-28 LAB — INR PPP: 2.2 (ref 2–3)

## 2017-07-28 PROCEDURE — 99211 OFF/OP EST MAY X REQ PHY/QHP: CPT | Mod: 25,S$GLB,,

## 2017-07-28 PROCEDURE — 85610 PROTHROMBIN TIME: CPT | Mod: QW,S$GLB,,

## 2017-07-28 NOTE — PROGRESS NOTES
INR good. No changes in medications or health. Patient denies greens once per week as listed in chart. She reports she eats salads with iceberg lettuce about 4 times per week. She occasionally has moderate vit K foods like avocado or coleslaw. Will update chart to reflect patients actual diet. She reports bruising from hitting things. No signs or symptoms of bleeding. INR stable. Maintain current dose and repeat INR in 4 weeks.

## 2017-08-04 RX ORDER — PRAVASTATIN SODIUM 40 MG/1
TABLET ORAL
Qty: 180 TABLET | Refills: 3 | Status: SHIPPED | OUTPATIENT
Start: 2017-08-04 | End: 2018-12-03 | Stop reason: SDUPTHER

## 2017-08-09 ENCOUNTER — TELEPHONE (OUTPATIENT)
Dept: INTERNAL MEDICINE | Facility: CLINIC | Age: 82
End: 2017-08-09

## 2017-08-09 NOTE — TELEPHONE ENCOUNTER
----- Message from Kayleenpatria Goldsmith sent at 8/9/2017  1:43 PM CDT -----  Contact: Pt [phone]  Patient would like to get medical advice.  Symptoms (please be specific):  Constant stream of urination, pain and discomfort  How long has patient had these symptoms:  Two days  Pharmacy name and phone #:  [facility], [phone]  Any drug allergies:  Check Chart  Comments: Pt would like urnie scheduled for tomorrow early at main campus if needed.

## 2017-08-11 ENCOUNTER — TELEPHONE (OUTPATIENT)
Dept: INTERNAL MEDICINE | Facility: CLINIC | Age: 82
End: 2017-08-11

## 2017-08-11 ENCOUNTER — OFFICE VISIT (OUTPATIENT)
Dept: INTERNAL MEDICINE | Facility: CLINIC | Age: 82
End: 2017-08-11
Payer: MEDICARE

## 2017-08-11 VITALS
DIASTOLIC BLOOD PRESSURE: 68 MMHG | SYSTOLIC BLOOD PRESSURE: 132 MMHG | WEIGHT: 185.63 LBS | BODY MASS INDEX: 29.96 KG/M2 | HEART RATE: 93 BPM | TEMPERATURE: 98 F | RESPIRATION RATE: 18 BRPM

## 2017-08-11 DIAGNOSIS — N39.0 URINARY TRACT INFECTION WITHOUT HEMATURIA, SITE UNSPECIFIED: Primary | ICD-10-CM

## 2017-08-11 LAB
BACTERIA #/AREA URNS AUTO: ABNORMAL /HPF
BILIRUB SERPL-MCNC: NORMAL MG/DL
BILIRUB UR QL STRIP: NEGATIVE
BLOOD URINE, POC: NORMAL
CLARITY UR REFRACT.AUTO: ABNORMAL
COLOR UR AUTO: YELLOW
COLOR, POC UA: YELLOW
GLUCOSE UR QL STRIP: NEGATIVE
GLUCOSE UR QL STRIP: NORMAL
HGB UR QL STRIP: ABNORMAL
HYALINE CASTS UR QL AUTO: 0 /LPF
KETONES UR QL STRIP: NEGATIVE
KETONES UR QL STRIP: NORMAL
LEUKOCYTE ESTERASE UR QL STRIP: ABNORMAL
LEUKOCYTE ESTERASE URINE, POC: NORMAL
MICROSCOPIC COMMENT: ABNORMAL
NITRITE UR QL STRIP: NEGATIVE
NITRITE, POC UA: POSITIVE
PH UR STRIP: 5 [PH] (ref 5–8)
PH, POC UA: 6
PROT UR QL STRIP: ABNORMAL
PROTEIN, POC: NORMAL
RBC #/AREA URNS AUTO: 36 /HPF (ref 0–4)
SP GR UR STRIP: 1.02 (ref 1–1.03)
SPECIFIC GRAVITY, POC UA: 1.01
URN SPEC COLLECT METH UR: ABNORMAL
UROBILINOGEN UR STRIP-ACNC: 2 EU/DL
UROBILINOGEN, POC UA: NORMAL
WBC #/AREA URNS AUTO: >100 /HPF (ref 0–5)
WBC CLUMPS UR QL AUTO: ABNORMAL

## 2017-08-11 PROCEDURE — 87186 SC STD MICRODIL/AGAR DIL: CPT

## 2017-08-11 PROCEDURE — 81001 URINALYSIS AUTO W/SCOPE: CPT

## 2017-08-11 PROCEDURE — 81002 URINALYSIS NONAUTO W/O SCOPE: CPT | Mod: S$GLB,,, | Performed by: FAMILY MEDICINE

## 2017-08-11 PROCEDURE — 99999 PR PBB SHADOW E&M-EST. PATIENT-LVL III: CPT | Mod: PBBFAC,,, | Performed by: FAMILY MEDICINE

## 2017-08-11 PROCEDURE — 99214 OFFICE O/P EST MOD 30 MIN: CPT | Mod: 25,S$GLB,, | Performed by: FAMILY MEDICINE

## 2017-08-11 PROCEDURE — 87077 CULTURE AEROBIC IDENTIFY: CPT

## 2017-08-11 PROCEDURE — 1125F AMNT PAIN NOTED PAIN PRSNT: CPT | Mod: S$GLB,,, | Performed by: FAMILY MEDICINE

## 2017-08-11 PROCEDURE — 87086 URINE CULTURE/COLONY COUNT: CPT

## 2017-08-11 PROCEDURE — 87088 URINE BACTERIA CULTURE: CPT

## 2017-08-11 PROCEDURE — 1159F MED LIST DOCD IN RCRD: CPT | Mod: S$GLB,,, | Performed by: FAMILY MEDICINE

## 2017-08-11 PROCEDURE — 3008F BODY MASS INDEX DOCD: CPT | Mod: S$GLB,,, | Performed by: FAMILY MEDICINE

## 2017-08-11 RX ORDER — NITROFURANTOIN 25; 75 MG/1; MG/1
100 CAPSULE ORAL 2 TIMES DAILY
Qty: 10 CAPSULE | Refills: 0 | Status: SHIPPED | OUTPATIENT
Start: 2017-08-11 | End: 2017-08-16

## 2017-08-11 RX ORDER — PHENAZOPYRIDINE HYDROCHLORIDE 200 MG/1
200 TABLET, FILM COATED ORAL 3 TIMES DAILY PRN
Qty: 10 TABLET | Refills: 0 | Status: SHIPPED | OUTPATIENT
Start: 2017-08-11 | End: 2017-08-14

## 2017-08-11 RX ORDER — CIPROFLOXACIN 500 MG/1
500 TABLET ORAL 2 TIMES DAILY
Qty: 14 TABLET | Refills: 0 | Status: SHIPPED | OUTPATIENT
Start: 2017-08-11 | End: 2017-08-11

## 2017-08-11 NOTE — TELEPHONE ENCOUNTER
----- Message from Kayleen Goldsmith sent at 8/11/2017  9:03 AM CDT -----  Contact: Jayashree with [facility] [phone]  Jayashree called requesting to change medication Cipro to another medication due to drug interaction with Warafen.

## 2017-08-11 NOTE — PROGRESS NOTES
Subjective:       Patient ID: Jenniffer Jimenez is a 85 y.o. female.    Chief Complaint: Urinary Tract Infection    HPI 85-year-old white female presents to clinic today secondary to a complaint of right flank pain, nausea, burning with urination, urinary frequency, urinary urgency, and increased fatigue worsening over the past week.  She has been taking Tylenol without relief.  Review of Systems   Constitutional: Positive for fatigue. Negative for appetite change, chills and fever.   HENT: Negative for congestion, ear pain, hearing loss, postnasal drip, rhinorrhea, sinus pressure, sore throat and tinnitus.    Eyes: Negative for redness, itching and visual disturbance.   Respiratory: Negative for cough, chest tightness and shortness of breath.    Cardiovascular: Negative for chest pain and palpitations.   Gastrointestinal: Positive for nausea. Negative for abdominal pain, constipation, diarrhea and vomiting.   Genitourinary: Positive for dysuria, flank pain (right flank), frequency and urgency. Negative for decreased urine volume, difficulty urinating and hematuria.   Musculoskeletal: Negative for back pain, myalgias, neck pain and neck stiffness.   Skin: Negative for rash.   Neurological: Negative for dizziness, light-headedness and headaches.   Psychiatric/Behavioral: Negative.        Objective:      Physical Exam   Constitutional: She is oriented to person, place, and time. She appears well-developed and well-nourished. No distress.   HENT:   Head: Normocephalic and atraumatic.   Right Ear: External ear normal.   Left Ear: External ear normal.   Nose: Nose normal.   Mouth/Throat: Oropharynx is clear and moist. No oropharyngeal exudate.   Eyes: Conjunctivae and EOM are normal. Pupils are equal, round, and reactive to light. Right eye exhibits no discharge. Left eye exhibits no discharge. No scleral icterus.   Neck: Normal range of motion. Neck supple. No JVD present. No tracheal deviation present. No  thyromegaly present.   Cardiovascular: Normal rate, regular rhythm, normal heart sounds and intact distal pulses.  Exam reveals no gallop and no friction rub.    No murmur heard.  Pulmonary/Chest: Effort normal and breath sounds normal. No stridor. No respiratory distress. She has no wheezes. She has no rales.   Abdominal: Soft. Bowel sounds are normal. She exhibits no distension and no mass. There is no tenderness. There is CVA tenderness (right). There is no rebound and no guarding.   Musculoskeletal: Normal range of motion. She exhibits no edema or tenderness.   Lymphadenopathy:     She has no cervical adenopathy.   Neurological: She is alert and oriented to person, place, and time.   Skin: Skin is warm and dry. No rash noted. She is not diaphoretic. No erythema. No pallor.   Psychiatric: She has a normal mood and affect. Her behavior is normal. Judgment and thought content normal.   Nursing note and vitals reviewed.      Results for orders placed or performed in visit on 08/11/17   POCT URINE DIPSTICK WITHOUT MICROSCOPE   Result Value Ref Range    Color, UA yellow     Spec Grav UA 1.015     pH, UA 6     WBC, UA 2+     Nitrite, UA positive     Protein 100+     Glucose, UA normal     Ketones, UA neg.     Urobilinogen, UA normal     Bilirubin neg.     Blood, UA about 250        Assessment:       1. Urinary tract infection without hematuria, site unspecified        Plan:       Urinary tract infection without hematuria, site unspecified  -     Urine culture  -     POCT URINE DIPSTICK WITHOUT MICROSCOPE  -     Urinalysis  -     ciprofloxacin HCl (CIPRO) 500 MG tablet; Take 1 tablet (500 mg total) by mouth 2 (two) times daily.  Dispense: 14 tablet; Refill: 0  -     phenazopyridine (PYRIDIUM) 200 MG tablet; Take 1 tablet (200 mg total) by mouth 3 (three) times daily as needed for Pain.  Dispense: 10 tablet; Refill: 0    Return to clinic as needed if symptoms persist or worsen.

## 2017-08-14 LAB — BACTERIA UR CULT: NORMAL

## 2017-08-23 ENCOUNTER — CLINICAL SUPPORT (OUTPATIENT)
Dept: INTERNAL MEDICINE | Facility: CLINIC | Age: 82
End: 2017-08-23
Payer: MEDICARE

## 2017-08-23 DIAGNOSIS — J30.9 ACUTE ALLERGIC RHINITIS, UNSPECIFIED SEASONALITY, UNSPECIFIED TRIGGER: ICD-10-CM

## 2017-08-23 PROCEDURE — 99499 UNLISTED E&M SERVICE: CPT | Mod: S$GLB,,, | Performed by: ALLERGY & IMMUNOLOGY

## 2017-08-23 PROCEDURE — 95115 IMMUNOTHERAPY ONE INJECTION: CPT | Mod: S$GLB,,, | Performed by: FAMILY MEDICINE

## 2017-08-24 ENCOUNTER — TELEPHONE (OUTPATIENT)
Dept: INTERNAL MEDICINE | Facility: CLINIC | Age: 82
End: 2017-08-24

## 2017-08-24 NOTE — TELEPHONE ENCOUNTER
----- Message from Odilia Blanc sent at 8/24/2017  8:18 AM CDT -----  Contact: pt [phone]  Pt said she had leg cramps during the night going up to her groan area and she is taking potassium and would like to know what else she can do,please advise pt

## 2017-08-24 NOTE — TELEPHONE ENCOUNTER
Spoke to pt who stated that last night upper right leg was painful radiating up to groin area. Offered to schedule pt apt for tomorrow, she refused. Please advise if apt is needed or pt can take otc medication

## 2017-08-25 ENCOUNTER — ANTI-COAG VISIT (OUTPATIENT)
Dept: CARDIOLOGY | Facility: CLINIC | Age: 82
End: 2017-08-25
Payer: MEDICARE

## 2017-08-25 DIAGNOSIS — Z79.01 LONG TERM (CURRENT) USE OF ANTICOAGULANTS: Primary | ICD-10-CM

## 2017-08-25 DIAGNOSIS — I48.20 CHRONIC ATRIAL FIBRILLATION: ICD-10-CM

## 2017-08-25 LAB — INR PPP: 2.9 (ref 2–3)

## 2017-08-25 PROCEDURE — 99211 OFF/OP EST MAY X REQ PHY/QHP: CPT | Mod: 25,S$GLB,, | Performed by: PHARMACIST

## 2017-08-25 PROCEDURE — 85610 PROTHROMBIN TIME: CPT | Mod: QW,S$GLB,, | Performed by: PHARMACIST

## 2017-08-25 NOTE — PROGRESS NOTES
Patient reports drinking cranberry juice about 2 weeks ago. She reports taking Macro BID for 5 days, which she has completed now. She will be advised to always call coumadin clinic when starting new medications. Patient was re-educated on situations that would require placing a call to the coumadin clinic, including bleeding or unusual bruising issues, changes in health, diet or medications, upcoming procedures that require warfarin interruption, and missed coumadin dose(s). Patient expressed understanding that avoidance of consistency with these parameters could cause fluctuations in INR, leading to more frequent visits and increase risk of adverse events.

## 2017-08-31 ENCOUNTER — HOSPITAL ENCOUNTER (OUTPATIENT)
Dept: RADIOLOGY | Facility: HOSPITAL | Age: 82
Discharge: HOME | End: 2017-08-31
Attending: INTERNAL MEDICINE
Payer: MEDICARE

## 2017-08-31 ENCOUNTER — PATIENT MESSAGE (OUTPATIENT)
Dept: INTERNAL MEDICINE | Facility: CLINIC | Age: 82
End: 2017-08-31

## 2017-08-31 ENCOUNTER — OFFICE VISIT (OUTPATIENT)
Dept: INTERNAL MEDICINE | Facility: CLINIC | Age: 82
End: 2017-08-31
Payer: MEDICARE

## 2017-08-31 VITALS
WEIGHT: 187.19 LBS | RESPIRATION RATE: 20 BRPM | TEMPERATURE: 98 F | HEART RATE: 96 BPM | DIASTOLIC BLOOD PRESSURE: 64 MMHG | BODY MASS INDEX: 30.21 KG/M2 | SYSTOLIC BLOOD PRESSURE: 116 MMHG

## 2017-08-31 DIAGNOSIS — R10.9 RIGHT SIDED ABDOMINAL PAIN: Primary | ICD-10-CM

## 2017-08-31 DIAGNOSIS — R10.9 RIGHT FLANK PAIN: ICD-10-CM

## 2017-08-31 DIAGNOSIS — R11.0 NAUSEA: ICD-10-CM

## 2017-08-31 DIAGNOSIS — R10.9 RIGHT SIDED ABDOMINAL PAIN: ICD-10-CM

## 2017-08-31 PROCEDURE — 99999 PR PBB SHADOW E&M-EST. PATIENT-LVL III: CPT | Mod: PBBFAC,,, | Performed by: INTERNAL MEDICINE

## 2017-08-31 PROCEDURE — 1159F MED LIST DOCD IN RCRD: CPT | Mod: S$GLB,,, | Performed by: INTERNAL MEDICINE

## 2017-08-31 PROCEDURE — 3008F BODY MASS INDEX DOCD: CPT | Mod: S$GLB,,, | Performed by: INTERNAL MEDICINE

## 2017-08-31 PROCEDURE — 72100 X-RAY EXAM L-S SPINE 2/3 VWS: CPT | Mod: TC,PO

## 2017-08-31 PROCEDURE — 72100 X-RAY EXAM L-S SPINE 2/3 VWS: CPT | Mod: 26,,, | Performed by: RADIOLOGY

## 2017-08-31 PROCEDURE — 99214 OFFICE O/P EST MOD 30 MIN: CPT | Mod: S$GLB,,, | Performed by: INTERNAL MEDICINE

## 2017-08-31 PROCEDURE — 1125F AMNT PAIN NOTED PAIN PRSNT: CPT | Mod: S$GLB,,, | Performed by: INTERNAL MEDICINE

## 2017-08-31 PROCEDURE — 74000 XR ABDOMEN AP 1 VIEW: CPT | Mod: TC,PO

## 2017-08-31 PROCEDURE — 74000 XR ABDOMEN AP 1 VIEW: CPT | Mod: 26,,, | Performed by: RADIOLOGY

## 2017-08-31 NOTE — PROGRESS NOTES
Subjective:       Patient ID: Jenniffer Jimenez is a 85 y.o. female.    Chief Complaint: Leg Pain (right ) and Hip Pain    Patient is a 85 y.o.female who presents today for right lower abdominal pain for one week. She has been having some fatigue and weakness. The abdominal pain is constant during the day. The pain seems to be worse after eating. Some diarrhea. No nausea or vomiting. No fever or chills. The pain does radiate into her back.     Review of Systems   Constitutional: Negative for appetite change, chills, diaphoresis, fatigue and fever.   HENT: Negative for congestion, dental problem, ear discharge, ear pain, hearing loss, postnasal drip, sinus pressure and sore throat.    Eyes: Negative for discharge, redness and itching.   Respiratory: Negative for cough, chest tightness, shortness of breath and wheezing.    Cardiovascular: Negative for chest pain, palpitations and leg swelling.   Gastrointestinal: Positive for abdominal pain. Negative for constipation, diarrhea, nausea and vomiting.   Endocrine: Negative for cold intolerance and heat intolerance.   Genitourinary: Negative for difficulty urinating, frequency, hematuria and urgency.   Musculoskeletal: Positive for back pain. Negative for arthralgias, gait problem, myalgias and neck pain.   Skin: Negative for color change and rash.   Neurological: Negative for dizziness, syncope and headaches.   Hematological: Negative for adenopathy.   Psychiatric/Behavioral: Negative for behavioral problems and sleep disturbance. The patient is not nervous/anxious.        Objective:      Physical Exam   Constitutional: She is oriented to person, place, and time. She appears well-developed and well-nourished. No distress.   HENT:   Head: Normocephalic and atraumatic.   Right Ear: External ear normal.   Left Ear: External ear normal.   Nose: Nose normal.   Mouth/Throat: Oropharynx is clear and moist. No oropharyngeal exudate.   Eyes: Conjunctivae and EOM are normal.  Pupils are equal, round, and reactive to light. Right eye exhibits no discharge. Left eye exhibits no discharge. No scleral icterus.   Neck: Normal range of motion. Neck supple. No JVD present. No thyromegaly present.   Cardiovascular: Normal rate, regular rhythm, normal heart sounds and intact distal pulses.  Exam reveals no gallop and no friction rub.    No murmur heard.  Pulmonary/Chest: Effort normal and breath sounds normal. No stridor. No respiratory distress. She has no wheezes. She has no rales. She exhibits no tenderness.   Abdominal: Soft. Bowel sounds are normal. She exhibits no distension. There is tenderness in the right lower quadrant. There is no rebound.   Musculoskeletal: She exhibits no edema.        Lumbar back: She exhibits decreased range of motion and tenderness (right flank).   Lymphadenopathy:     She has no cervical adenopathy.   Neurological: She is alert and oriented to person, place, and time. No cranial nerve deficit.   Skin: Skin is warm. No rash noted. She is not diaphoretic. No erythema.   Psychiatric: She has a normal mood and affect. Her behavior is normal.   Nursing note and vitals reviewed.      Assessment and Plan:       1. Right sided abdominal pain  - unsure of etiology; possible liver vs kidney stone vs uti vs back pain  - CBC auto differential; Future  - Comprehensive metabolic panel; Future  - Magnesium; Future  - Urinalysis; Future  - Urine culture; Future  - CT Abdomen Without Contrast; Future  - X-Ray Lumbar Spine Ap And Lateral; Future  - X-Ray Abdomen AP 1 View; Future    2. Nausea    - CBC auto differential; Future  - Comprehensive metabolic panel; Future  - Magnesium; Future  - Urinalysis; Future  - Urine culture; Future  - CT Abdomen Without Contrast; Future  - X-Ray Lumbar Spine Ap And Lateral; Future  - X-Ray Abdomen AP 1 View; Future    3. Right flank pain    - X-Ray Lumbar Spine Ap And Lateral; Future  - X-Ray Abdomen AP 1 View; Future    Notify clinic if  symptoms change, worsen or do not improve          No Follow-up on file.

## 2017-09-01 ENCOUNTER — TELEPHONE (OUTPATIENT)
Dept: INTERNAL MEDICINE | Facility: CLINIC | Age: 82
End: 2017-09-01

## 2017-09-01 ENCOUNTER — HOSPITAL ENCOUNTER (OUTPATIENT)
Dept: RADIOLOGY | Facility: HOSPITAL | Age: 82
Discharge: HOME | End: 2017-09-01
Attending: INTERNAL MEDICINE
Payer: MEDICARE

## 2017-09-01 DIAGNOSIS — R11.0 NAUSEA: ICD-10-CM

## 2017-09-01 DIAGNOSIS — R10.9 RIGHT SIDED ABDOMINAL PAIN: ICD-10-CM

## 2017-09-01 DIAGNOSIS — R10.31 RIGHT LOWER QUADRANT PAIN: ICD-10-CM

## 2017-09-01 DIAGNOSIS — R10.31 RIGHT LOWER QUADRANT PAIN: Primary | ICD-10-CM

## 2017-09-01 PROCEDURE — 25500020 PHARM REV CODE 255: Performed by: INTERNAL MEDICINE

## 2017-09-01 PROCEDURE — 74177 CT ABD & PELVIS W/CONTRAST: CPT | Mod: 26,,, | Performed by: RADIOLOGY

## 2017-09-01 PROCEDURE — 74177 CT ABD & PELVIS W/CONTRAST: CPT | Mod: TC

## 2017-09-01 RX ADMIN — IOHEXOL 15 ML: 350 INJECTION, SOLUTION INTRAVENOUS at 04:09

## 2017-09-01 RX ADMIN — IOHEXOL 15 ML: 350 INJECTION, SOLUTION INTRAVENOUS at 03:09

## 2017-09-01 RX ADMIN — IOHEXOL 100 ML: 350 INJECTION, SOLUTION INTRAVENOUS at 05:09

## 2017-09-02 ENCOUNTER — PATIENT MESSAGE (OUTPATIENT)
Dept: INTERNAL MEDICINE | Facility: CLINIC | Age: 82
End: 2017-09-02

## 2017-09-05 ENCOUNTER — PATIENT MESSAGE (OUTPATIENT)
Dept: INTERNAL MEDICINE | Facility: CLINIC | Age: 82
End: 2017-09-05

## 2017-09-21 ENCOUNTER — ANTI-COAG VISIT (OUTPATIENT)
Dept: CARDIOLOGY | Facility: CLINIC | Age: 82
End: 2017-09-21
Payer: MEDICARE

## 2017-09-21 DIAGNOSIS — I48.20 CHRONIC ATRIAL FIBRILLATION: ICD-10-CM

## 2017-09-21 DIAGNOSIS — Z79.01 LONG TERM (CURRENT) USE OF ANTICOAGULANTS: Primary | ICD-10-CM

## 2017-09-21 LAB — INR PPP: 1.8 (ref 2–3)

## 2017-09-21 PROCEDURE — 85610 PROTHROMBIN TIME: CPT | Mod: QW,S$GLB,, | Performed by: PHARMACIST

## 2017-09-21 NOTE — PROGRESS NOTES
Patient reports possibly more greens lately and she will not continue this. We will boost dose today then resume her previous dose. Patient was re-educated on situations that would require placing a call to the coumadin clinic, including bleeding or unusual bruising issues, changes in health, diet or medications, upcoming procedures that require warfarin interruption, and missed coumadin dose(s). Patient expressed understanding that avoidance of consistency with these parameters could cause fluctuations in INR, leading to more frequent visits and increase risk of adverse events.

## 2017-09-29 ENCOUNTER — CLINICAL SUPPORT (OUTPATIENT)
Dept: INTERNAL MEDICINE | Facility: CLINIC | Age: 82
End: 2017-09-29
Payer: MEDICARE

## 2017-09-29 DIAGNOSIS — J30.9 ACUTE ALLERGIC RHINITIS, UNSPECIFIED SEASONALITY, UNSPECIFIED TRIGGER: Primary | ICD-10-CM

## 2017-10-19 ENCOUNTER — ANTI-COAG VISIT (OUTPATIENT)
Dept: CARDIOLOGY | Facility: CLINIC | Age: 82
End: 2017-10-19
Payer: MEDICARE

## 2017-10-19 DIAGNOSIS — Z79.01 LONG-TERM (CURRENT) USE OF ANTICOAGULANTS: Primary | ICD-10-CM

## 2017-10-19 DIAGNOSIS — I48.20 CHRONIC ATRIAL FIBRILLATION: ICD-10-CM

## 2017-10-19 LAB — INR PPP: 1.9 (ref 2–3)

## 2017-10-19 PROCEDURE — 99211 OFF/OP EST MAY X REQ PHY/QHP: CPT | Mod: 25,S$GLB,, | Performed by: PHARMACIST

## 2017-10-19 PROCEDURE — 85610 PROTHROMBIN TIME: CPT | Mod: QW,S$GLB,, | Performed by: PHARMACIST

## 2017-10-19 NOTE — PROGRESS NOTES
INR slightly low and has been hovering low. Patient denies any changes in diet, medications, or health that would effect warfarin therapy.  I will increase her weekly dose at this time but very slightly due to patient's age.

## 2017-10-20 ENCOUNTER — OFFICE VISIT (OUTPATIENT)
Dept: OPTOMETRY | Facility: CLINIC | Age: 82
End: 2017-10-20
Payer: MEDICARE

## 2017-10-20 DIAGNOSIS — Z96.1 PSEUDOPHAKIA OF BOTH EYES: ICD-10-CM

## 2017-10-20 DIAGNOSIS — H35.363 RETINAL DRUSEN OF BOTH EYES: Primary | ICD-10-CM

## 2017-10-20 DIAGNOSIS — H52.203 ASTIGMATISM OF BOTH EYES, UNSPECIFIED TYPE: ICD-10-CM

## 2017-10-20 DIAGNOSIS — Z98.42 S/P CATARACT SURGERY, LEFT: ICD-10-CM

## 2017-10-20 DIAGNOSIS — Z98.41 S/P CATARACT SURGERY, RIGHT: ICD-10-CM

## 2017-10-20 PROCEDURE — 99999 PR PBB SHADOW E&M-EST. PATIENT-LVL II: CPT | Mod: PBBFAC,,, | Performed by: OPTOMETRIST

## 2017-10-20 PROCEDURE — 92014 COMPRE OPH EXAM EST PT 1/>: CPT | Mod: S$GLB,,, | Performed by: OPTOMETRIST

## 2017-10-20 PROCEDURE — 92015 DETERMINE REFRACTIVE STATE: CPT | Mod: S$GLB,,, | Performed by: OPTOMETRIST

## 2017-10-20 NOTE — PATIENT INSTRUCTIONS
S/P cataract surgery in both eyes.  Posterior chamber intraocular lens in both eyes.  S/P YAG laser posterior capsulotomy in the right eye.  Geneally, good ocular health in both eyes.  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  New spectacle lens Rx issued for use as desired.  Recommended full-time wear.  Recheck in one year - or prior if any problems noted in the interim.

## 2017-10-20 NOTE — PROGRESS NOTES
HPI     Concerns About Ocular Health    Additional comments: Eye exam - general eye examination and   refraction.Has seen Dr. Finn for many years.  Has seen Dr. Cheng in the   past, and is scheduled to see Dr. Easley soon.  H/O CRVO in OS.   S/p strabismus surgery a few years ago (Dr. Ceja).  No diplopia           Comments   Patient's age: 85 y.o. WFM   Occupation: Retired  Approximate date of last eye examination: 02/19/2016  Name of last eye doctor seen: Dr. Lis Desai  City/State: [facility]  Wears glasses? Yes      If yes, wears  Full-time or part-time?  Full time   Present glasses are: Bifocal, SV Distance, SV Reading?  Progressive  Approximate age of present glasses:  1 year   Got new glasses following last exam, or subsequently?:  yes   Any problem with VA with glasses?  Patient feels her vision has changed   overall  Wears CLs?:  No  Headaches?  No  Eye pain/discomfort?  No                                                                                     Flashes?  No  Floaters?  No  Diplopia/Double vision?  No  Patient's Ocular History:         Any eye surgeries? Cataract surgery OU          Any eye injury?           Any treatment for eye disease?  Central Retinal Vein Occlusion OS   Family history of eye disease?  None reported   Significant patient medical history:         1. Diabetes?  No       If yes, IDDM or NIDDM? n/a   2. HBP?  Yes, managed with medication              3. Other (describe):  Hearing impaired.    ! OTC eyedrops currently using:  No   ! Prescription eye meds currently using:  No   ! Any history of allergy/adverse reaction to any eye meds used   previously?  No    ! Any history of allergy/adverse reaction to eyedrops used during prior   eye exam(s)? No    ! Any history of allergy/adverse reaction to Novacaine or similar meds?   No   ! Any history of allergy/adverse reaction to Epinephrine or similar meds?   No    ! Patient okay with use of anesthetic eyedrops to check eye pressure?     yes        ! Patient okay with use of eyedrops to dilate pupils today?  yes   !  Allergies/Medications/Medical History/Family History reviewed today?    Yes       PD =     Desired reading distance =    Approx computer distance =                                                                  Last edited by Ashok Hernandez, OD on 10/20/2017  8:21 AM. (History)            Assessment /Plan     For exam results, see Encounter Report.    1. Retinal drusen of both eyes     2. S/P cataract surgery, left     3. S/P cataract surgery, right     4. Pseudophakia of both eyes     5. Astigmatism of both eyes, unspecified type                 S/P cataract surgery in both eyes.  Posterior chamber intraocular lens in both eyes.  S/P YAG laser posterior capsulotomy in the right eye.  Geneally, good ocular health in both eyes.  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  New spectacle lens Rx issued for use as desired.  Recommended full-time wear.  Recheck in one year - or prior if any problems noted in the interim.

## 2017-10-23 ENCOUNTER — TELEPHONE (OUTPATIENT)
Dept: ALLERGY | Facility: CLINIC | Age: 82
End: 2017-10-23

## 2017-10-23 NOTE — TELEPHONE ENCOUNTER
Called pt to reschedule appt.  No answer.  Left msg that we won't have a nurse in injection clinic Wednesday 10/24 a.m., so we will have to reschedule to the afternoon, or she is welcome to come Monday 10/23 or Friday 10/27.  I will accommodate her at any time.

## 2017-10-27 ENCOUNTER — CLINICAL SUPPORT (OUTPATIENT)
Dept: INTERNAL MEDICINE | Facility: CLINIC | Age: 82
End: 2017-10-27
Payer: MEDICARE

## 2017-10-27 DIAGNOSIS — J31.0 CHRONIC RHINITIS, UNSPECIFIED TYPE: ICD-10-CM

## 2017-10-27 PROCEDURE — 95115 IMMUNOTHERAPY ONE INJECTION: CPT | Mod: S$GLB,,, | Performed by: FAMILY MEDICINE

## 2017-10-27 PROCEDURE — 99499 UNLISTED E&M SERVICE: CPT | Mod: S$GLB,,, | Performed by: ALLERGY & IMMUNOLOGY

## 2017-11-03 ENCOUNTER — ANTI-COAG VISIT (OUTPATIENT)
Dept: CARDIOLOGY | Facility: CLINIC | Age: 82
End: 2017-11-03

## 2017-11-03 ENCOUNTER — OFFICE VISIT (OUTPATIENT)
Dept: OPHTHALMOLOGY | Facility: CLINIC | Age: 82
End: 2017-11-03
Payer: MEDICARE

## 2017-11-03 ENCOUNTER — LAB VISIT (OUTPATIENT)
Dept: LAB | Facility: HOSPITAL | Age: 82
End: 2017-11-03
Attending: INTERNAL MEDICINE
Payer: MEDICARE

## 2017-11-03 DIAGNOSIS — H34.8192 CRVO (CENTRAL RETINAL VEIN OCCLUSION): Primary | ICD-10-CM

## 2017-11-03 DIAGNOSIS — I50.32 CHRONIC DIASTOLIC HEART FAILURE: ICD-10-CM

## 2017-11-03 DIAGNOSIS — E78.00 PURE HYPERCHOLESTEROLEMIA: ICD-10-CM

## 2017-11-03 DIAGNOSIS — Z79.01 LONG-TERM (CURRENT) USE OF ANTICOAGULANTS: ICD-10-CM

## 2017-11-03 DIAGNOSIS — H43.813 POSTERIOR VITREOUS DETACHMENT, BILATERAL: ICD-10-CM

## 2017-11-03 DIAGNOSIS — I48.20 CHRONIC ATRIAL FIBRILLATION: ICD-10-CM

## 2017-11-03 DIAGNOSIS — Z96.1 PSEUDOPHAKIA, BOTH EYES: ICD-10-CM

## 2017-11-03 LAB
ALT SERPL W/O P-5'-P-CCNC: 14 U/L
ANION GAP SERPL CALC-SCNC: 10 MMOL/L
AST SERPL-CCNC: 16 U/L
BUN SERPL-MCNC: 20 MG/DL
CALCIUM SERPL-MCNC: 9.3 MG/DL
CHLORIDE SERPL-SCNC: 104 MMOL/L
CHOLEST SERPL-MCNC: 212 MG/DL
CHOLEST/HDLC SERPL: 3.1 {RATIO}
CO2 SERPL-SCNC: 26 MMOL/L
CREAT SERPL-MCNC: 1 MG/DL
EST. GFR  (AFRICAN AMERICAN): 59.4 ML/MIN/1.73 M^2
EST. GFR  (NON AFRICAN AMERICAN): 51.5 ML/MIN/1.73 M^2
GLUCOSE SERPL-MCNC: 111 MG/DL
HCT VFR BLD AUTO: 36.9 %
HDLC SERPL-MCNC: 68 MG/DL
HDLC SERPL: 32.1 %
HGB BLD-MCNC: 12.3 G/DL
INR PPP: 1.7
LDLC SERPL CALC-MCNC: 122.2 MG/DL
NONHDLC SERPL-MCNC: 144 MG/DL
POTASSIUM SERPL-SCNC: 4.4 MMOL/L
PROTHROMBIN TIME: 17.4 SEC
SODIUM SERPL-SCNC: 140 MMOL/L
TRIGL SERPL-MCNC: 109 MG/DL

## 2017-11-03 PROCEDURE — 92014 COMPRE OPH EXAM EST PT 1/>: CPT | Mod: S$GLB,,, | Performed by: OPHTHALMOLOGY

## 2017-11-03 PROCEDURE — 80048 BASIC METABOLIC PNL TOTAL CA: CPT

## 2017-11-03 PROCEDURE — 84460 ALANINE AMINO (ALT) (SGPT): CPT

## 2017-11-03 PROCEDURE — 36415 COLL VENOUS BLD VENIPUNCTURE: CPT

## 2017-11-03 PROCEDURE — 85018 HEMOGLOBIN: CPT

## 2017-11-03 PROCEDURE — 99999 PR PBB SHADOW E&M-EST. PATIENT-LVL II: CPT | Mod: PBBFAC,,, | Performed by: OPHTHALMOLOGY

## 2017-11-03 PROCEDURE — 85014 HEMATOCRIT: CPT

## 2017-11-03 PROCEDURE — 80061 LIPID PANEL: CPT

## 2017-11-03 PROCEDURE — 85610 PROTHROMBIN TIME: CPT

## 2017-11-03 PROCEDURE — 84450 TRANSFERASE (AST) (SGOT): CPT

## 2017-11-05 ENCOUNTER — PATIENT MESSAGE (OUTPATIENT)
Dept: CARDIOLOGY | Facility: CLINIC | Age: 82
End: 2017-11-05

## 2017-11-06 ENCOUNTER — ANTI-COAG VISIT (OUTPATIENT)
Dept: CARDIOLOGY | Facility: CLINIC | Age: 82
End: 2017-11-06

## 2017-11-06 DIAGNOSIS — I48.20 CHRONIC ATRIAL FIBRILLATION: ICD-10-CM

## 2017-11-07 ENCOUNTER — TELEPHONE (OUTPATIENT)
Dept: DERMATOLOGY | Facility: CLINIC | Age: 82
End: 2017-11-07

## 2017-11-07 NOTE — TELEPHONE ENCOUNTER
----- Message from Daysi Coello MD sent at 11/7/2017  4:09 PM CST -----  Contact: pt at [phone] her cell  Ok to put her in no need for lunch but thanks  ----- Message -----  From: Zoraida Chance MA  Sent: 11/7/2017   2:39 PM  To: MD Dr. Mode Wallace Mrs. Jimenez is asking to come on the 29th  Also she stated she will bring herself some lunch if she have to you're at 22 only yellow spot is available  ----- Message -----  From: Ann Harding  Sent: 11/7/2017   2:00 PM  To: Mode MOHAN [facility] pt-pt wants top be seen on the Nov. 29.  Will wait all dayand bring her lunch.  Will be there for an allergy injection

## 2017-11-10 ENCOUNTER — OFFICE VISIT (OUTPATIENT)
Dept: CARDIOLOGY | Facility: CLINIC | Age: 82
End: 2017-11-10
Payer: MEDICARE

## 2017-11-10 VITALS
SYSTOLIC BLOOD PRESSURE: 122 MMHG | HEART RATE: 80 BPM | BODY MASS INDEX: 30.93 KG/M2 | DIASTOLIC BLOOD PRESSURE: 78 MMHG | HEIGHT: 66 IN | WEIGHT: 192.44 LBS

## 2017-11-10 DIAGNOSIS — Z79.01 CURRENT USE OF LONG TERM ANTICOAGULATION: ICD-10-CM

## 2017-11-10 DIAGNOSIS — E66.9 OBESITY (BMI 30.0-34.9): ICD-10-CM

## 2017-11-10 DIAGNOSIS — I48.20 CHRONIC ATRIAL FIBRILLATION: Primary | ICD-10-CM

## 2017-11-10 DIAGNOSIS — N18.30 CHRONIC KIDNEY DISEASE, STAGE III (MODERATE): ICD-10-CM

## 2017-11-10 DIAGNOSIS — H91.93 BILATERAL HEARING LOSS, UNSPECIFIED HEARING LOSS TYPE: ICD-10-CM

## 2017-11-10 DIAGNOSIS — E78.00 PURE HYPERCHOLESTEROLEMIA: Chronic | ICD-10-CM

## 2017-11-10 DIAGNOSIS — I50.32 CHRONIC DIASTOLIC HEART FAILURE: ICD-10-CM

## 2017-11-10 DIAGNOSIS — R26.89 BALANCE PROBLEMS: ICD-10-CM

## 2017-11-10 PROCEDURE — 99214 OFFICE O/P EST MOD 30 MIN: CPT | Mod: S$GLB,,, | Performed by: NURSE PRACTITIONER

## 2017-11-10 PROCEDURE — 99499 UNLISTED E&M SERVICE: CPT | Mod: S$GLB,,, | Performed by: NURSE PRACTITIONER

## 2017-11-10 PROCEDURE — 99999 PR PBB SHADOW E&M-EST. PATIENT-LVL III: CPT | Mod: PBBFAC,,, | Performed by: NURSE PRACTITIONER

## 2017-11-10 PROCEDURE — 93000 ELECTROCARDIOGRAM COMPLETE: CPT | Mod: S$GLB,,, | Performed by: INTERNAL MEDICINE

## 2017-11-10 NOTE — PROGRESS NOTES
Ms. Jimenez is a patient of Dr. Saenz and was last seen in [facility] 5/26/2017.    Subjective:   Patient ID:  Jenniffershade Jimenez is a 85 y.o. female who presents for follow-up of Atrial Fibrillation    Problem List:  Atrial fib 1/16  HTN  Hypercholesterolemia  TIA 1997 and then transient vision loss ~2005  HFpEF, EF 55-60%  Former smoker, quit 1982  CKD stage III  Glucose intolerance    HPI  Ms. Jimenez is in clinic today for routine follow up.  Reports missing lasix two days in a row and has hd some weight gain.  Her weight is up 6 pounds since her last visit.  Patient denies chest pain with exertion or at rest, palpitations, SOB, dizziness, syncope, edema, orthopnea, PND, or claudication.  Reports routine exercise, riding the stationary bike. She is gardening and cleaning her own home.  She is treated with low dose ASA and moderate intensity statin.  Patient is taking Pravastatin 40mg and LDL is 122.  Reports following a low salt diet. She is anticoagulated with coumadin but has had some trouble with getting her levels correct because of some recent dietary changes.  Denies bleeding gums, epistaxis, hematuria, and hematochezia.  She can not take beta blockers because of her allergy shots.  Reports having received her flu shot.    Review of Systems   Constitution: Negative for decreased appetite, diaphoresis, weakness, malaise/fatigue, weight gain and weight loss.   HENT: Positive for congestion.    Eyes: Negative for visual disturbance.   Cardiovascular: Positive for dyspnea on exertion. Negative for chest pain, claudication, irregular heartbeat, leg swelling, near-syncope, orthopnea, palpitations, paroxysmal nocturnal dyspnea and syncope.        Denies chest pressure   Respiratory: Positive for cough. Negative for hemoptysis, shortness of breath, sleep disturbances due to breathing and snoring.    Endocrine: Negative for cold intolerance and heat intolerance.   Hematologic/Lymphatic: Negative for bleeding  problem. Does not bruise/bleed easily.   Musculoskeletal: Positive for muscle cramps. Negative for myalgias.   Gastrointestinal: Negative for bloating, abdominal pain, anorexia, change in bowel habit, constipation, diarrhea, nausea and vomiting.   Neurological: Negative for difficulty with concentration, disturbances in coordination, excessive daytime sleepiness, dizziness, headaches, light-headedness, loss of balance and numbness.   Psychiatric/Behavioral: The patient does not have insomnia.      Allergies and current medications updated and reviewed:  Review of patient's allergies indicates:   Allergen Reactions    Bactrim [sulfamethoxazole-trimethoprim]      arthritis    Dramamine [dimenhydrinate]     Tramadol Other (See Comments)     hallucinations    Beta-blockers (beta-adrenergic blocking agts)      Can not go on beta blockers for long period of time    Phenergan [promethazine]      per pt. request- saw sisters have bad reaction to drug     Current Outpatient Prescriptions   Medication Sig    aspirin (ECOTRIN) 81 MG EC tablet Take 81 mg by mouth once daily.    betamethasone valerate 0.1% (VALISONE) 0.1 % Lotn Apply topically 2 (two) times daily. qtts 3 AD Bid.    clindamycin (CLEOCIN T) 1 % lotion Use hs on face    fluticasone (FLONASE) 50 mcg/actuation nasal spray 1 spray by Each Nare route once daily.    FLUZONE HIGH-DOSE 2017-18, PF, 180 mcg/0.5 mL vaccine     furosemide (LASIX) 20 MG tablet Take 1 tablet (20 mg total) by mouth once daily.    ketoconazole (NIZORAL) 2 % cream Apply topically 2 (two) times daily.    peg 400-propylene glycol (SYSTANE) 0.4-0.3 % Drop Apply to eye daily as needed.     potassium chloride (MICRO-K) 10 MEQ CpSR Take 1 capsule (10 mEq total) by mouth once daily.    pravastatin (PRAVACHOL) 40 MG tablet TAKE 1 TABLET TWICE DAILY (Patient taking differently: Pt taking one pill once a day.)    Warfarin 5mg Take 1 and 1/2 tablets a day      No current facility-administered  "medications for this visit.      Objective:        /78   Pulse 80   Ht 5' 6" (1.676 m)   Wt 87.3 kg (192 lb 7.4 oz)   LMP  (LMP Unknown)   BMI 31.06 kg/m²     Physical Exam   Constitutional: She is oriented to person, place, and time. Vital signs are normal. She appears well-developed and well-nourished. She is active. No distress.   HENT:   Head: Normocephalic and atraumatic.   Eyes: Conjunctivae and lids are normal. No scleral icterus.   Neck: Neck supple. Normal carotid pulses, no hepatojugular reflux and no JVD present. Carotid bruit is not present.   Cardiovascular: Normal rate, regular rhythm, S1 normal, S2 normal and intact distal pulses.  PMI is not displaced.  Exam reveals no gallop and no friction rub.    No murmur heard.  Pulses:       Carotid pulses are 2+ on the right side, and 2+ on the left side.       Radial pulses are 2+ on the right side, and 2+ on the left side.        Dorsalis pedis pulses are 2+ on the right side, and 2+ on the left side.        Posterior tibial pulses are 1+ on the right side, and 1+ on the left side.   Pulmonary/Chest: Effort normal and breath sounds normal. No respiratory distress. She has no decreased breath sounds. She has no wheezes. She has no rhonchi. She has no rales. She exhibits no tenderness.   Abdominal: Soft. Normal appearance and bowel sounds are normal. She exhibits no distension, no fluid wave, no abdominal bruit, no ascites and no pulsatile midline mass. There is no hepatosplenomegaly. There is no tenderness.   Musculoskeletal: She exhibits edema (BLE +1 pitting).   Neurological: She is alert and oriented to person, place, and time. Gait normal.   Skin: Skin is warm, dry and intact. No rash noted. She is not diaphoretic. Nails show no clubbing.   Psychiatric: She has a normal mood and affect. Her speech is normal and behavior is normal. Judgment and thought content normal. Cognition and memory are normal.   Nursing note and vitals reviewed.      " Chemistry        Component Value Date/Time     11/03/2017 0650    K 4.4 11/03/2017 0650     11/03/2017 0650    CO2 26 11/03/2017 0650    BUN 20 11/03/2017 0650    CREATININE 1.0 11/03/2017 0650     (H) 11/03/2017 0650        Component Value Date/Time    CALCIUM 9.3 11/03/2017 0650    ALKPHOS 119 08/31/2017 0740    AST 16 11/03/2017 0650    ALT 14 11/03/2017 0650    BILITOT 0.7 08/31/2017 0740    ESTGFRAFRICA 59.4 (A) 11/03/2017 0650    EGFRNONAA 51.5 (A) 11/03/2017 0650        Lab Results   Component Value Date    HGBA1C 6.2 03/31/2017       Recent Labs  Lab 10/05/16  0705 11/09/16  0705  01/24/17  0730  08/31/17  0740 11/03/17  0650   WHITE BLOOD CELL COUNT  --  5.17  --  4.40  < > 5.23  --    HEMOGLOBIN  --  13.6  < > 12.3  < > 12.5 12.3   HEMATOCRIT  --  39.7  < > 36.3 L  < > 36.7 L 36.9 L   MCV  --  87  --  88  < > 88  --    PLATELETS  --  273  --  242  < > 255  --     H 107 H  --  131 H  --   --   --    TSH  --  1.547  --   --   --   --   --    CHOLESTEROL  --  227 H  --   --   < >  --  212 H   HDL  --  65  --   --   < >  --  68   LDL CHOLESTEROL  --  141.6  --   --   < >  --  122.2   TRIGLYCERIDES  --  102  --   --   < >  --  109   HDL/CHOLESTEROL RATIO  --  28.6  --   --   < >  --  32.1   < > = values in this interval not displayed.      Recent Labs  Lab 08/25/17  0901 09/21/17  0927 10/19/17  1003 11/03/17  0650   INR 2.9 1.8 A 1.9 A 1.7 H        Test(s) Reviewed  I have reviewed the following in detail:  [] Stress test   [] Angiography   [x] Echocardiogram   [x] Labs   [] Other:         Assessment/Plan:     Chronic atrial fibrillation  Comments:  Anticoagulated with coumadin. Not on rate controlling medications. ECG shows atrial fibrillation with HR 76. Declines beta blocker because of allergy treatments  Orders:  -     EKG 12-lead    Current use of long term anticoagulation  Comments:  Followed in the coumadin clinic. Denies bleeding    Chronic diastolic heart  failure  Comments:  6 pound weight gain since her last visit. Missed 2 days of lasix. No SOB/JVD. +1Pitting edema. Instructed to take lasix BID today    Pure hypercholesterolemia  Comments:  . Taking pravastatin 20mg daily.     Chronic kidney disease, stage III (moderate)  Comments:  Stable. GFR mildly depressed. CRT only 1 and BUN wnl    Obesity (BMI 30.0-34.9)  Comments:  BMI 31    Balance problems  Comments:  Stopped riding exercise bike and is just restarting. Deconditioning is a contributing factor. Encouraged to exercise 150minutes a week.    Bilateral hearing loss, unspecified hearing loss type       Return in about 6 months (around 5/10/2018).

## 2017-11-10 NOTE — PATIENT INSTRUCTIONS
Restrict salt to no more than 2,000mg a day  -No more than 100mg of salt in a snack  -No more than 250mg of salt in an entree  -No more than 500mg of salt in an entire meal    Increase cardiovascular exercise to 30 minutes of brisk walking a day for 4-5 days a week.  You can use a stationary bike or swim for 30 minutes a day instead of walking.  Whatever exercise you choose, make sure you are working hard enough to increase your heart rate.     Take an extra dose of lasix today to help get rid of the extra fluid.    You can take an extra dose of Lasix (furosemide) if weight increases 2 or more lbs. in a 24 hr period, or 3-4 lbs. over a 3-4 day period. Contact our office if weight does not return to baseline within 1-2 days.    You should avoid NSAIDs (Nonsteroidal anti-inflammatory drugs) because you take a blood thinner. NSAIDs are: Ibuprofen, Advil, Motrin, Aleeve, Naproxen, Meloxicam, Mobic, Diclofenac and Voltaren.  Do not take Celebrex either.  You can take Tylenol for pain. It is not a NSAID. Limit Tylenol to 4 tabs (500 mg each) in a 24 hr period.

## 2017-11-15 NOTE — PROGRESS NOTES
Patient called to report that as per Dr. Finn today she started: Lutin-25mg, Zeaxanthin -5mg, Congugated Linoleic acid -1250mg, D-3 -2000 IU, Glucosemine & Chondroitin twice a day, next INR is due 11/20, call back # [phone] or [phone]

## 2017-11-15 NOTE — PROGRESS NOTES
HPI     DLS 10/26/2016 Dr. Cheng            10/20/17 Dr. Hernandez    Pt states has been followed yearly by Dr. Cheng. Finds that she is having   more difficulty with reading and TV. She never felt the glasses given by   Dr. Finn were correct, she is getting new ones from Dr. Hernandez exam.      She is feeling off balance and bumping into things. She is wondering if   her eyes are causing this.              POHX:    1. S/P Recession LR OD 6mm on 03/19/14  2.S/P Strabismus sx OD 2/6/13, Strabismus repair OU (12/19/13) Dr Cjea  3. Hx: CRVO OS, s/p laser x 2  4.  PVD OU  5.HTN Ret OU  6.PSK OU               Gtts: Systane PRN OU        Last edited by Tavia Menendez on 11/3/2017  9:10 AM. (History)          OCT without fluid OU    Assessment /Plan     For exam results, see Encounter Report.    CRVO (central retinal vein occlusion) - Left Eye    Pseudophakia, both eyes    Posterior vitreous detachment, bilateral    Other orders  -     Cancel: OCT- Retina; Future; Expected date: 11/03/2017      No NV/ME    Excellent cataract and strabismus sx result    Peripheral drusen OU    Observe    See PMD if balance issue not solved by new glasses or if worsens in any way    RTC q 1 yr, sooner PRN    F/u Dr Hernandez as planned.  Can also just f/u Dr. Hernandez and with retina PRN ONLY

## 2017-11-20 ENCOUNTER — ANTI-COAG VISIT (OUTPATIENT)
Dept: CARDIOLOGY | Facility: CLINIC | Age: 82
End: 2017-11-20
Payer: MEDICARE

## 2017-11-20 DIAGNOSIS — Z79.01 CURRENT USE OF LONG TERM ANTICOAGULATION: ICD-10-CM

## 2017-11-20 DIAGNOSIS — I48.20 CHRONIC ATRIAL FIBRILLATION: ICD-10-CM

## 2017-11-20 LAB — INR PPP: 2 (ref 2–3)

## 2017-11-20 PROCEDURE — 85610 PROTHROMBIN TIME: CPT | Mod: QW,S$GLB,,

## 2017-11-20 NOTE — PROGRESS NOTES
Patient reports dose as 4.5mg daily x 3mg tue. Has resumed head lettuce salads. Continues on supplements. Will maintain dose.

## 2017-11-23 RX ORDER — POTASSIUM CHLORIDE 750 MG/1
CAPSULE, EXTENDED RELEASE ORAL
Qty: 90 CAPSULE | Refills: 3 | Status: SHIPPED | OUTPATIENT
Start: 2017-11-23 | End: 2018-11-22 | Stop reason: SDUPTHER

## 2017-11-24 RX ORDER — FUROSEMIDE 20 MG/1
TABLET ORAL
Qty: 90 TABLET | Refills: 3 | Status: SHIPPED | OUTPATIENT
Start: 2017-11-24 | End: 2018-11-06 | Stop reason: SDUPTHER

## 2017-11-27 RX ORDER — WARFARIN 3 MG/1
TABLET ORAL
Qty: 120 TABLET | Refills: 0 | Status: SHIPPED | OUTPATIENT
Start: 2017-11-27 | End: 2018-02-18 | Stop reason: SDUPTHER

## 2017-11-28 ENCOUNTER — TELEPHONE (OUTPATIENT)
Dept: INTERNAL MEDICINE | Facility: CLINIC | Age: 82
End: 2017-11-28

## 2017-11-28 DIAGNOSIS — R39.89 SENSATION OF PRESSURE IN BLADDER AREA: Primary | ICD-10-CM

## 2017-11-28 NOTE — TELEPHONE ENCOUNTER
----- Message from Clara Del Castillo sent at 11/28/2017  3:36 PM CST -----  Contact: Pt [phone]  Pt would like a call back from the nurse regarding a message that was left earlier. Pt would like a call back today

## 2017-11-28 NOTE — TELEPHONE ENCOUNTER
----- Message from Gillian Solano sent at 11/28/2017 11:18 AM CST -----  Contact: patient- [phone] cell  Patient would like to get medical advice.  Symptoms (please be specific):  Hard time urinating, feeling pressure and discomfort  How long has patient had these symptoms:  3 days  Pharmacy name and phone #:  [facility] [phone] (Phone)  [phone] (Fax)  Any drug allergies:    Comments:

## 2017-11-29 ENCOUNTER — TELEPHONE (OUTPATIENT)
Dept: INTERNAL MEDICINE | Facility: CLINIC | Age: 82
End: 2017-11-29

## 2017-11-29 ENCOUNTER — CLINICAL SUPPORT (OUTPATIENT)
Dept: INTERNAL MEDICINE | Facility: CLINIC | Age: 82
End: 2017-11-29
Payer: MEDICARE

## 2017-11-29 ENCOUNTER — OFFICE VISIT (OUTPATIENT)
Dept: DERMATOLOGY | Facility: CLINIC | Age: 82
End: 2017-11-29
Payer: MEDICARE

## 2017-11-29 ENCOUNTER — LAB VISIT (OUTPATIENT)
Dept: LAB | Facility: HOSPITAL | Age: 82
End: 2017-11-29
Attending: INTERNAL MEDICINE
Payer: MEDICARE

## 2017-11-29 VITALS — BODY MASS INDEX: 30.99 KG/M2 | WEIGHT: 192 LBS

## 2017-11-29 DIAGNOSIS — J30.9 CHRONIC ALLERGIC RHINITIS, UNSPECIFIED SEASONALITY, UNSPECIFIED TRIGGER: ICD-10-CM

## 2017-11-29 DIAGNOSIS — L71.9 ROSACEA: Primary | ICD-10-CM

## 2017-11-29 DIAGNOSIS — R39.89 SENSATION OF PRESSURE IN BLADDER AREA: ICD-10-CM

## 2017-11-29 LAB
BACTERIA #/AREA URNS AUTO: ABNORMAL /HPF
BILIRUB UR QL STRIP: NEGATIVE
CLARITY UR REFRACT.AUTO: ABNORMAL
COLOR UR AUTO: YELLOW
GLUCOSE UR QL STRIP: NEGATIVE
HGB UR QL STRIP: ABNORMAL
HYALINE CASTS UR QL AUTO: 0 /LPF
KETONES UR QL STRIP: NEGATIVE
LEUKOCYTE ESTERASE UR QL STRIP: ABNORMAL
MICROSCOPIC COMMENT: ABNORMAL
NITRITE UR QL STRIP: POSITIVE
PH UR STRIP: 5 [PH] (ref 5–8)
PROT UR QL STRIP: ABNORMAL
RBC #/AREA URNS AUTO: 22 /HPF (ref 0–4)
SP GR UR STRIP: 1.02 (ref 1–1.03)
URN SPEC COLLECT METH UR: ABNORMAL
UROBILINOGEN UR STRIP-ACNC: NEGATIVE EU/DL
WBC #/AREA URNS AUTO: >100 /HPF (ref 0–5)
WBC CLUMPS UR QL AUTO: ABNORMAL

## 2017-11-29 PROCEDURE — 99999 PR PBB SHADOW E&M-EST. PATIENT-LVL III: CPT | Mod: PBBFAC,,, | Performed by: DERMATOLOGY

## 2017-11-29 PROCEDURE — 87077 CULTURE AEROBIC IDENTIFY: CPT

## 2017-11-29 PROCEDURE — 87088 URINE BACTERIA CULTURE: CPT

## 2017-11-29 PROCEDURE — 99212 OFFICE O/P EST SF 10 MIN: CPT | Mod: S$GLB,,, | Performed by: DERMATOLOGY

## 2017-11-29 PROCEDURE — 99499 UNLISTED E&M SERVICE: CPT | Mod: S$GLB,,, | Performed by: ALLERGY & IMMUNOLOGY

## 2017-11-29 PROCEDURE — 87186 SC STD MICRODIL/AGAR DIL: CPT

## 2017-11-29 PROCEDURE — 87086 URINE CULTURE/COLONY COUNT: CPT

## 2017-11-29 PROCEDURE — 95115 IMMUNOTHERAPY ONE INJECTION: CPT | Mod: S$GLB,,, | Performed by: FAMILY MEDICINE

## 2017-11-29 PROCEDURE — 81001 URINALYSIS AUTO W/SCOPE: CPT

## 2017-11-29 RX ORDER — AMOXICILLIN AND CLAVULANATE POTASSIUM 875; 125 MG/1; MG/1
1 TABLET, FILM COATED ORAL 2 TIMES DAILY
Qty: 14 TABLET | Refills: 0 | Status: SHIPPED | OUTPATIENT
Start: 2017-11-29 | End: 2017-12-01

## 2017-11-29 NOTE — PROGRESS NOTES
Gave 0.5mL of vial 1: 1 (red) Upper Right arm. Pt tolerated well, no notable reaction, no complaints voiced..

## 2017-11-29 NOTE — TELEPHONE ENCOUNTER
Left voicemail for patient to let her know that urine appears infected. Sent abx to her pharmacy. Culture still pending

## 2017-11-29 NOTE — PROGRESS NOTES
Subjective:       Patient ID:  Jenniffer Jimenez is a 85 y.o. female who presents for   Chief Complaint   Patient presents with    Skin Discoloration     nose    Hair/Scalp Problem     History of Present Illness: The patient presents with chief complaint of redness .  Location: nose  Duration: weeks  Signs/Symptoms: none    Prior treatments: none    Would like rosacea flare improved prior to holidays.           Review of Systems   Constitutional: Negative for fever.   Skin: Negative for itching and rash.   Hematologic/Lymphatic: Bruises/bleeds easily.        Objective:    Physical Exam   Skin:   Areas Examined (abnormalities noted in diagram):   Head / Face Inspection Performed              Diagram Legend     Erythematous scaling macule/papule c/w actinic keratosis       Vascular papule c/w angioma      Pigmented verrucoid papule/plaque c/w seborrheic keratosis      Yellow umbilicated papule c/w sebaceous hyperplasia      Irregularly shaped tan macule c/w lentigo     1-2 mm smooth white papules consistent with Milia      Movable subcutaneous cyst with punctum c/w epidermal inclusion cyst      Subcutaneous movable cyst c/w pilar cyst      Firm pink to brown papule c/w dermatofibroma      Pedunculated fleshy papule(s) c/w skin tag(s)      Evenly pigmented macule c/w junctional nevus     Mildly variegated pigmented, slightly irregular-bordered macule c/w mildly atypical nevus      Flesh colored to evenly pigmented papule c/w intradermal nevus       Pink pearly papule/plaque c/w basal cell carcinoma      Erythematous hyperkeratotic cursted plaque c/w SCC      Surgical scar with no sign of skin cancer recurrence      Open and closed comedones      Inflammatory papules and pustules      Verrucoid papule consistent consistent with wart     Erythematous eczematous patches and plaques     Dystrophic onycholytic nail with subungual debris c/w onychomycosis     Umbilicated papule    Erythematous-base heme-crusted tan  verrucoid plaque consistent with inflamed seborrheic keratosis     Erythematous Silvery Scaling Plaque c/w Psoriasis     See annotation      Assessment / Plan:        Rosacea  try soolantra  ultravate lotion for 1-2 days nose  Cont cleocin t lotion           Return if symptoms worsen or fail to improve.

## 2017-11-30 ENCOUNTER — PATIENT MESSAGE (OUTPATIENT)
Dept: INTERNAL MEDICINE | Facility: CLINIC | Age: 82
End: 2017-11-30

## 2017-11-30 NOTE — TELEPHONE ENCOUNTER
----- Message from Vania Cesar sent at 11/29/2017  3:03 PM CST -----  Contact: CELL # [phone]  Patient is returning a phone call.  Who left a message for the patient: Clara   Does patient know what this is regarding: urine results   Comments:

## 2017-12-01 ENCOUNTER — PATIENT MESSAGE (OUTPATIENT)
Dept: INTERNAL MEDICINE | Facility: CLINIC | Age: 82
End: 2017-12-01

## 2017-12-01 LAB — BACTERIA UR CULT: NORMAL

## 2017-12-01 RX ORDER — NITROFURANTOIN 25; 75 MG/1; MG/1
100 CAPSULE ORAL 2 TIMES DAILY
Qty: 20 CAPSULE | Refills: 0 | Status: SHIPPED | OUTPATIENT
Start: 2017-12-01 | End: 2017-12-02

## 2017-12-02 ENCOUNTER — PATIENT MESSAGE (OUTPATIENT)
Dept: INTERNAL MEDICINE | Facility: CLINIC | Age: 82
End: 2017-12-02

## 2017-12-02 RX ORDER — CIPROFLOXACIN 500 MG/1
500 TABLET ORAL 2 TIMES DAILY
Qty: 14 TABLET | Refills: 0 | Status: SHIPPED | OUTPATIENT
Start: 2017-12-02 | End: 2017-12-02 | Stop reason: SDUPTHER

## 2017-12-03 RX ORDER — CIPROFLOXACIN 500 MG/1
500 TABLET ORAL 2 TIMES DAILY
Qty: 14 TABLET | Refills: 0 | Status: SHIPPED | OUTPATIENT
Start: 2017-12-03 | End: 2017-12-10

## 2017-12-12 RX ORDER — TRIAMCINOLONE ACETONIDE 55 UG/1
2 SPRAY, METERED NASAL DAILY
Qty: 17 G | Refills: 0 | Status: SHIPPED | OUTPATIENT
Start: 2017-12-12 | End: 2021-02-11

## 2017-12-15 RX ORDER — IVERMECTIN 10 MG/G
CREAM TOPICAL
Qty: 30 G | Refills: 3 | Status: SHIPPED | OUTPATIENT
Start: 2017-12-15 | End: 2018-04-06

## 2017-12-15 RX ORDER — IVERMECTIN 10 MG/G
CREAM TOPICAL
COMMUNITY
End: 2017-12-15 | Stop reason: SDUPTHER

## 2017-12-18 ENCOUNTER — ANTI-COAG VISIT (OUTPATIENT)
Dept: CARDIOLOGY | Facility: CLINIC | Age: 82
End: 2017-12-18
Payer: MEDICARE

## 2017-12-18 DIAGNOSIS — Z79.01 CURRENT USE OF LONG TERM ANTICOAGULATION: Primary | ICD-10-CM

## 2017-12-18 DIAGNOSIS — I48.20 CHRONIC ATRIAL FIBRILLATION: ICD-10-CM

## 2017-12-18 LAB — INR PPP: 2.2 (ref 2–3)

## 2017-12-18 PROCEDURE — 85610 PROTHROMBIN TIME: CPT | Mod: QW,S$GLB,,

## 2017-12-18 PROCEDURE — 99211 OFF/OP EST MAY X REQ PHY/QHP: CPT | Mod: 25,S$GLB,,

## 2017-12-18 NOTE — PROGRESS NOTES
INR within normal range today. Patient states that she was taking an antibiotic for a bladder infection but is unsure of which one. She took he rlast dose on the antibiotic last night. Patient states that she will call us later today with the name of the medication. She denies any bleeding, bruising or other changes. We willl maintain her current dose and follow up in 4 weeks. Advised patient to notify us of any changes or concerns.

## 2017-12-19 ENCOUNTER — TELEPHONE (OUTPATIENT)
Dept: INTERNAL MEDICINE | Facility: CLINIC | Age: 82
End: 2017-12-19

## 2017-12-19 RX ORDER — FLUTICASONE PROPIONATE 50 MCG
1 SPRAY, SUSPENSION (ML) NASAL DAILY
Qty: 3 BOTTLE | Refills: 0 | Status: SHIPPED | OUTPATIENT
Start: 2017-12-19 | End: 2018-12-03 | Stop reason: SDUPTHER

## 2017-12-19 NOTE — TELEPHONE ENCOUNTER
----- Message from Naida Bruce sent at 12/19/2017 11:48 AM CST -----  Contact: Patient [phone]  Prescription Request:     Name of medication: fluticasone (FLONASE) 50 mcg/actuation nasal spray    Reason for request: Refill    Pharmacy: [facility] [address]    Please advise. 90 day supply    Thank You

## 2017-12-27 ENCOUNTER — CLINICAL SUPPORT (OUTPATIENT)
Dept: INTERNAL MEDICINE | Facility: CLINIC | Age: 82
End: 2017-12-27
Payer: MEDICARE

## 2017-12-27 DIAGNOSIS — J30.9 CHRONIC ALLERGIC RHINITIS, UNSPECIFIED SEASONALITY, UNSPECIFIED TRIGGER: ICD-10-CM

## 2017-12-27 PROCEDURE — 95115 IMMUNOTHERAPY ONE INJECTION: CPT | Mod: S$GLB,,, | Performed by: INTERNAL MEDICINE

## 2017-12-27 PROCEDURE — 99499 UNLISTED E&M SERVICE: CPT | Mod: S$GLB,,, | Performed by: ALLERGY & IMMUNOLOGY

## 2018-01-02 ENCOUNTER — DOCUMENTATION ONLY (OUTPATIENT)
Dept: DERMATOLOGY | Facility: CLINIC | Age: 83
End: 2018-01-02

## 2018-01-24 ENCOUNTER — OFFICE VISIT (OUTPATIENT)
Dept: INTERNAL MEDICINE | Facility: CLINIC | Age: 83
End: 2018-01-24
Payer: MEDICARE

## 2018-01-24 ENCOUNTER — CLINICAL SUPPORT (OUTPATIENT)
Dept: INTERNAL MEDICINE | Facility: CLINIC | Age: 83
End: 2018-01-24
Payer: MEDICARE

## 2018-01-24 ENCOUNTER — ANTI-COAG VISIT (OUTPATIENT)
Dept: CARDIOLOGY | Facility: CLINIC | Age: 83
End: 2018-01-24

## 2018-01-24 ENCOUNTER — PATIENT MESSAGE (OUTPATIENT)
Dept: INTERNAL MEDICINE | Facility: CLINIC | Age: 83
End: 2018-01-24

## 2018-01-24 ENCOUNTER — LAB VISIT (OUTPATIENT)
Dept: LAB | Facility: HOSPITAL | Age: 83
End: 2018-01-24
Attending: INTERNAL MEDICINE
Payer: MEDICARE

## 2018-01-24 VITALS
WEIGHT: 195.13 LBS | HEART RATE: 90 BPM | DIASTOLIC BLOOD PRESSURE: 67 MMHG | TEMPERATURE: 99 F | SYSTOLIC BLOOD PRESSURE: 120 MMHG | BODY MASS INDEX: 31.49 KG/M2 | RESPIRATION RATE: 22 BRPM

## 2018-01-24 DIAGNOSIS — I48.20 CHRONIC ATRIAL FIBRILLATION: ICD-10-CM

## 2018-01-24 DIAGNOSIS — N18.30 CHRONIC KIDNEY DISEASE, STAGE III (MODERATE): ICD-10-CM

## 2018-01-24 DIAGNOSIS — Z79.01 CURRENT USE OF LONG TERM ANTICOAGULATION: ICD-10-CM

## 2018-01-24 DIAGNOSIS — M79.89 LEG SWELLING: ICD-10-CM

## 2018-01-24 DIAGNOSIS — E78.00 PURE HYPERCHOLESTEROLEMIA: ICD-10-CM

## 2018-01-24 DIAGNOSIS — I70.0 ATHEROSCLEROSIS OF AORTA: ICD-10-CM

## 2018-01-24 DIAGNOSIS — J30.9 CHRONIC ALLERGIC RHINITIS, UNSPECIFIED SEASONALITY, UNSPECIFIED TRIGGER: ICD-10-CM

## 2018-01-24 DIAGNOSIS — Z79.01 LONG TERM CURRENT USE OF ANTICOAGULANT THERAPY: ICD-10-CM

## 2018-01-24 DIAGNOSIS — I51.89 LEFT VENTRICULAR DIASTOLIC DYSFUNCTION WITH PRESERVED SYSTOLIC FUNCTION: ICD-10-CM

## 2018-01-24 DIAGNOSIS — I50.32 CHRONIC DIASTOLIC HEART FAILURE: ICD-10-CM

## 2018-01-24 DIAGNOSIS — R73.03 PREDIABETES: ICD-10-CM

## 2018-01-24 DIAGNOSIS — Z00.00 ANNUAL PHYSICAL EXAM: ICD-10-CM

## 2018-01-24 DIAGNOSIS — M47.817 FACET ARTHROPATHY, LUMBOSACRAL: ICD-10-CM

## 2018-01-24 DIAGNOSIS — Z00.00 ANNUAL PHYSICAL EXAM: Primary | ICD-10-CM

## 2018-01-24 LAB
ALBUMIN SERPL BCP-MCNC: 3.6 G/DL
ALP SERPL-CCNC: 129 U/L
ALT SERPL W/O P-5'-P-CCNC: 16 U/L
ANION GAP SERPL CALC-SCNC: 10 MMOL/L
AST SERPL-CCNC: 18 U/L
BASOPHILS # BLD AUTO: 0.03 K/UL
BASOPHILS NFR BLD: 0.6 %
BILIRUB SERPL-MCNC: 0.7 MG/DL
BNP SERPL-MCNC: 147 PG/ML
BUN SERPL-MCNC: 21 MG/DL
CALCIUM SERPL-MCNC: 9.5 MG/DL
CHLORIDE SERPL-SCNC: 104 MMOL/L
CHOLEST SERPL-MCNC: 224 MG/DL
CHOLEST/HDLC SERPL: 2.7 {RATIO}
CO2 SERPL-SCNC: 27 MMOL/L
CREAT SERPL-MCNC: 0.9 MG/DL
DIFFERENTIAL METHOD: ABNORMAL
EOSINOPHIL # BLD AUTO: 0.1 K/UL
EOSINOPHIL NFR BLD: 1.9 %
ERYTHROCYTE [DISTWIDTH] IN BLOOD BY AUTOMATED COUNT: 15.3 %
EST. GFR  (AFRICAN AMERICAN): >60 ML/MIN/1.73 M^2
EST. GFR  (NON AFRICAN AMERICAN): 58.5 ML/MIN/1.73 M^2
ESTIMATED AVG GLUCOSE: 120 MG/DL
GLUCOSE SERPL-MCNC: 113 MG/DL
HBA1C MFR BLD HPLC: 5.8 %
HCT VFR BLD AUTO: 37.6 %
HDLC SERPL-MCNC: 82 MG/DL
HDLC SERPL: 36.6 %
HGB BLD-MCNC: 12.4 G/DL
IMM GRANULOCYTES # BLD AUTO: 0.01 K/UL
IMM GRANULOCYTES NFR BLD AUTO: 0.2 %
INR PPP: 2.3
LDLC SERPL CALC-MCNC: 117.8 MG/DL
LYMPHOCYTES # BLD AUTO: 1 K/UL
LYMPHOCYTES NFR BLD: 21.5 %
MCH RBC QN AUTO: 29.2 PG
MCHC RBC AUTO-ENTMCNC: 33 G/DL
MCV RBC AUTO: 89 FL
MONOCYTES # BLD AUTO: 0.3 K/UL
MONOCYTES NFR BLD: 6.7 %
NEUTROPHILS # BLD AUTO: 3.3 K/UL
NEUTROPHILS NFR BLD: 69.1 %
NONHDLC SERPL-MCNC: 142 MG/DL
NRBC BLD-RTO: 0 /100 WBC
PLATELET # BLD AUTO: 258 K/UL
PMV BLD AUTO: 10.6 FL
POTASSIUM SERPL-SCNC: 4.2 MMOL/L
PROT SERPL-MCNC: 7.2 G/DL
PROTHROMBIN TIME: 21.9 SEC
RBC # BLD AUTO: 4.25 M/UL
SODIUM SERPL-SCNC: 141 MMOL/L
TRIGL SERPL-MCNC: 121 MG/DL
TSH SERPL DL<=0.005 MIU/L-ACNC: 4 UIU/ML
WBC # BLD AUTO: 4.75 K/UL

## 2018-01-24 PROCEDURE — 83036 HEMOGLOBIN GLYCOSYLATED A1C: CPT

## 2018-01-24 PROCEDURE — 99499 UNLISTED E&M SERVICE: CPT | Mod: S$GLB,,, | Performed by: ALLERGY & IMMUNOLOGY

## 2018-01-24 PROCEDURE — 95115 IMMUNOTHERAPY ONE INJECTION: CPT | Mod: S$GLB,,, | Performed by: FAMILY MEDICINE

## 2018-01-24 PROCEDURE — 99999 PR PBB SHADOW E&M-EST. PATIENT-LVL III: CPT | Mod: PBBFAC,,, | Performed by: INTERNAL MEDICINE

## 2018-01-24 PROCEDURE — 36415 COLL VENOUS BLD VENIPUNCTURE: CPT | Mod: PO

## 2018-01-24 PROCEDURE — 80061 LIPID PANEL: CPT

## 2018-01-24 PROCEDURE — 83880 ASSAY OF NATRIURETIC PEPTIDE: CPT

## 2018-01-24 PROCEDURE — 85025 COMPLETE CBC W/AUTO DIFF WBC: CPT

## 2018-01-24 PROCEDURE — 99397 PER PM REEVAL EST PAT 65+ YR: CPT | Mod: S$GLB,,, | Performed by: INTERNAL MEDICINE

## 2018-01-24 PROCEDURE — 85610 PROTHROMBIN TIME: CPT

## 2018-01-24 PROCEDURE — 84443 ASSAY THYROID STIM HORMONE: CPT

## 2018-01-24 PROCEDURE — 99499 UNLISTED E&M SERVICE: CPT | Mod: S$GLB,,, | Performed by: INTERNAL MEDICINE

## 2018-01-24 PROCEDURE — 80053 COMPREHEN METABOLIC PANEL: CPT

## 2018-01-24 NOTE — PROGRESS NOTES
Patient here for allergy injection.  No new meds, no problems with last injection.    0.5mL of Red 1:1 given SQ in upper Right arm.  Tolerated well.    Patient assessed after 30 minutes.  1+ reaction noted at injection site.  No complaints voiced.

## 2018-01-24 NOTE — PROGRESS NOTES
Subjective:       Patient ID: Jenniffer Jimenez is a 85 y.o. female.    Chief Complaint: Foot Swelling (numbness in ankles)    Patient is a 85 y.o.female with atherosclerosis of aorta, chronic diastolic heart failure, chronic atrial fib and facet arthropathy who presents today for annual.  Has some leg swelling at times. She does take lasix 20 mg daily.     Cholesterol: (due now)  Vaccines: Influenza (yearly); Tetanus (up to date) ; Pneumovax (2010; Prevnar 2015); Zoster (declines)  Eye exam: sept 2015  Mammogram: June 2017  Gyn exam: declines  Colonoscopy: June 2009; repeat in June 2019  DEXA: june 2016  Exercise: yes; walking  Diet: healthy    Review of Systems   Constitutional: Negative for appetite change, chills, diaphoresis, fatigue and fever.   HENT: Negative for congestion, dental problem, ear discharge, ear pain, hearing loss, postnasal drip, sinus pressure and sore throat.    Eyes: Negative for discharge, redness and itching.   Respiratory: Negative for cough, chest tightness, shortness of breath and wheezing.    Cardiovascular: Negative for chest pain, palpitations and leg swelling.   Gastrointestinal: Negative for abdominal pain, constipation, diarrhea, nausea and vomiting.   Endocrine: Negative for cold intolerance and heat intolerance.   Genitourinary: Negative for difficulty urinating, frequency, hematuria and urgency.   Musculoskeletal: Negative for arthralgias, back pain, gait problem, myalgias and neck pain.   Skin: Negative for color change and rash.   Neurological: Negative for dizziness, syncope and headaches.   Hematological: Negative for adenopathy.   Psychiatric/Behavioral: Negative for behavioral problems and sleep disturbance. The patient is not nervous/anxious.        Objective:      Physical Exam   Constitutional: She is oriented to person, place, and time. She appears well-developed and well-nourished. No distress.   HENT:   Head: Normocephalic and atraumatic.   Right Ear: External  ear normal.   Left Ear: External ear normal.   Nose: Nose normal.   Mouth/Throat: Oropharynx is clear and moist. No oropharyngeal exudate.   Eyes: Conjunctivae and EOM are normal. Pupils are equal, round, and reactive to light. Right eye exhibits no discharge. Left eye exhibits no discharge. No scleral icterus.   Neck: Normal range of motion. Neck supple. No JVD present. No thyromegaly present.   Cardiovascular: Normal rate, regular rhythm, normal heart sounds and intact distal pulses.  Exam reveals no gallop and no friction rub.    No murmur heard.  Pulmonary/Chest: Effort normal and breath sounds normal. No stridor. No respiratory distress. She has no wheezes. She has no rales. She exhibits no tenderness.   Abdominal: Soft. Bowel sounds are normal. She exhibits no distension. There is no tenderness. There is no rebound.   Musculoskeletal: Normal range of motion. She exhibits no edema or tenderness.   Lymphadenopathy:     She has no cervical adenopathy.   Neurological: She is alert and oriented to person, place, and time. No cranial nerve deficit.   Skin: Skin is warm and dry. No rash noted. She is not diaphoretic. No erythema.   Psychiatric: She has a normal mood and affect. Her behavior is normal.   Nursing note and vitals reviewed.      Assessment and Plan:       1. Annual physical exam  - labs due now  - CBC auto differential; Future  - Comprehensive metabolic panel; Future  - TSH; Future  - Lipid panel; Future  - Hemoglobin A1c; Future  - Urinalysis; Future  - Microalbumin/creatinine urine ratio; Future    2. Prediabetes  - following diet closely  - CBC auto differential; Future  - Comprehensive metabolic panel; Future  - TSH; Future  - Lipid panel; Future  - Hemoglobin A1c; Future  - Urinalysis; Future  - Microalbumin/creatinine urine ratio; Future    3. Current use of long term anticoagulation  - stable; followed by coumadin clinic  - CBC auto differential; Future  - Comprehensive metabolic panel; Future  -  TSH; Future  - Lipid panel; Future  - Hemoglobin A1c; Future  - Urinalysis; Future  - Microalbumin/creatinine urine ratio; Future    4. Chronic kidney disease, stage III (moderate)  - good control; check kidney function today  - CBC auto differential; Future  - Comprehensive metabolic panel; Future  - TSH; Future  - Lipid panel; Future  - Hemoglobin A1c; Future  - Urinalysis; Future  - Microalbumin/creatinine urine ratio; Future    5. Pure hypercholesterolemia  - on low cholesterol diet and statin  - CBC auto differential; Future  - Comprehensive metabolic panel; Future  - TSH; Future  - Lipid panel; Future  - Hemoglobin A1c; Future  - Urinalysis; Future  - Microalbumin/creatinine urine ratio; Future    6. Left ventricular diastolic dysfunction with preserved systolic function  - stable; monitored by cardio  - CBC auto differential; Future  - Comprehensive metabolic panel; Future  - TSH; Future  - Lipid panel; Future  - Hemoglobin A1c; Future  - Urinalysis; Future  - Microalbumin/creatinine urine ratio; Future    7. Chronic diastolic heart failure  - stable; monitored by cardio; echo due in may  - CBC auto differential; Future  - Comprehensive metabolic panel; Future  - TSH; Future  - Lipid panel; Future  - Hemoglobin A1c; Future  - Urinalysis; Future  - Microalbumin/creatinine urine ratio; Future    8. Atherosclerosis of aorta  - stable on statin and asa  - CBC auto differential; Future  - Comprehensive metabolic panel; Future  - TSH; Future  - Lipid panel; Future  - Hemoglobin A1c; Future  - Urinalysis; Future  - Microalbumin/creatinine urine ratio; Future    9. Chronic atrial fibrillation  - stable on warfarin  - CBC auto differential; Future  - Comprehensive metabolic panel; Future  - TSH; Future  - Lipid panel; Future  - Hemoglobin A1c; Future  - Urinalysis; Future  - Microalbumin/creatinine urine ratio; Future    10. Facet arthropathy, lumbosacral  - monitored  - CBC auto differential; Future  - Comprehensive  metabolic panel; Future  - TSH; Future  - Lipid panel; Future  - Hemoglobin A1c; Future  - Urinalysis; Future  - Microalbumin/creatinine urine ratio; Future    11. Leg swelling  - Brain natriuretic peptide; Future          No Follow-up on file.

## 2018-02-01 ENCOUNTER — PES CALL (OUTPATIENT)
Dept: ADMINISTRATIVE | Facility: CLINIC | Age: 83
End: 2018-02-01

## 2018-02-10 ENCOUNTER — NURSE TRIAGE (OUTPATIENT)
Dept: ADMINISTRATIVE | Facility: CLINIC | Age: 83
End: 2018-02-10

## 2018-02-10 NOTE — TELEPHONE ENCOUNTER
"Patient stated that she has mild exertional dyspnea and belly swelling. Had diarrhea and red arms/legs last night. History of heart failure but unable to read the scale. No shortness of breath heard during our call. Informed that I would send a message to the provider's office. Did take her morning medications today. Advised per protocol and she verbalized understanding. Instructed to call back with any additional problems and/or concerns      Reason for Disposition   [1] MILD swelling of both ankles (i.e., pedal edema) AND [2] is a chronic symptom (recurrent or ongoing AND present > 4 weeks)    Answer Assessment - Initial Assessment Questions  1. ONSET: "When did the swelling start?" (e.g., minutes, hours, days)      Yesterday morning  2. LOCATION: "What part of the leg is swollen?"  "Are both legs swollen or just one leg?"      Bilateral leg swelling  3. SEVERITY: "How bad is the swelling?" (e.g., localized; mild, moderate, severe)   - Localized - small area of swelling localized to one leg   - MILD pedal edema - swelling limited to foot and ankle, pitting edema < 1/4 inch (6 mm) deep, rest and elevation eliminate most or all swelling   - MODERATE edema - swelling of lower leg to knee, pitting edema > 1/4 inch (6 mm) deep, rest and elevation only partially reduce swelling   - SEVERE edema - swelling extends above knee, facial or hand swelling present       mild  4. REDNESS: "Does the swelling look red or infected?"      No  5. PAIN: "Is the swelling painful to touch?" If so, ask: "How painful is it?"   (Scale 1-10; mild, moderate or severe)      No  6. FEVER: "Do you have a fever?" If so, ask: "What is it, how was it measured, and when did it start?"       No  7. CAUSE: "What do you think is causing the leg swelling?"      Heart failure  8. MEDICAL HISTORY: "Do you have a history of heart failure, kidney disease, liver failure, or cancer?"      yes  9. RECURRENT SYMPTOM: "Have you had leg swelling before?" If so, " "ask: "When was the last time?" "What happened that time?"      No  10. OTHER SYMPTOMS: "Do you have any other symptoms?" (e.g., chest pain, difficulty breathing)        Exertional dyspnea  11. PREGNANCY: "Is there any chance you are pregnant?" "When was your last menstrual period?"        N/A    Protocols used: ST LEG SWELLING AND EDEMA-A-AH      "

## 2018-02-12 ENCOUNTER — TELEPHONE (OUTPATIENT)
Dept: CARDIOLOGY | Facility: CLINIC | Age: 83
End: 2018-02-12

## 2018-02-12 NOTE — TELEPHONE ENCOUNTER
Patient called back.  States that Thursday her abdomen was swollen on Friday, and soon after she had a large bout of diarrhea.  States her abdomen is now back to normal and the swelling in her legs is now back to normal.  States she is still having a slight shortness of breath.  Her BP is normal and has not run any fever.  Thinks overall she is back to her baseline and will call if she has any more issues.

## 2018-02-12 NOTE — TELEPHONE ENCOUNTER
Note from [facility]:    Patient stated that she has mild exertional dyspnea and belly swelling. Had diarrhea and red arms/legs last night. History of heart failure but unable to read the scale. No shortness of breath heard during our call. Informed that I would send a message to the provider's office. Did take her morning medications today. Advised per protocol and she verbalized understanding. Instructed to call back with any additional problems and/or concerns

## 2018-02-16 ENCOUNTER — OFFICE VISIT (OUTPATIENT)
Dept: OPTOMETRY | Facility: CLINIC | Age: 83
End: 2018-02-16
Payer: COMMERCIAL

## 2018-02-16 DIAGNOSIS — Z98.42 S/P CATARACT SURGERY, LEFT: ICD-10-CM

## 2018-02-16 DIAGNOSIS — Z98.41 S/P CATARACT SURGERY, RIGHT: ICD-10-CM

## 2018-02-16 DIAGNOSIS — Z96.1 PSEUDOPHAKIA OF BOTH EYES: ICD-10-CM

## 2018-02-16 DIAGNOSIS — H53.8 BLURRED VISION, BILATERAL: Primary | ICD-10-CM

## 2018-02-16 DIAGNOSIS — H52.203 ASTIGMATISM OF BOTH EYES, UNSPECIFIED TYPE: ICD-10-CM

## 2018-02-16 PROCEDURE — 99499 UNLISTED E&M SERVICE: CPT | Mod: S$GLB,,, | Performed by: OPTOMETRIST

## 2018-02-16 PROCEDURE — 99999 PR PBB SHADOW E&M-EST. PATIENT-LVL II: CPT | Mod: PBBFAC,,, | Performed by: OPTOMETRIST

## 2018-02-16 NOTE — PROGRESS NOTES
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     POHX:     1. S/P Recession LR OD 6mm on 03/19/14   2.S/P Strabismus sx OD 2/6/13, Strabismus repair OU (12/19/13) Dr Ceja   3. Hx: CRVO OS, s/p laser x 2   4.  PVD OU   5.HTN Ret OU   6.PSK OU     PD =  67/63   Desired reading distance =  13"                    Last edited by Ashok Hernandez, OD on 2/16/2018 10:22 AM. (History)            Assessment /Plan     For exam results, see Encounter Report.    1. Blurred vision, bilateral     2. S/P cataract surgery, left     3. S/P cataract surgery, right     4. Pseudophakia of both eyes     5. Astigmatism of both eyes, unspecified type                    S/P cataract surgery in both eyes.  Posterior chamber intraocular lens in both eyes.  S/P YAG laser posterior capsulotomy in the right eye.    Mrs. Jimenez in today with report that she failed vision test at 's license office.  Note that VA with glasses measured today is not as good as that obtained with refraction in 20/2017, or as good as obtained with refraction today.  Note no change in refractive error in each eye.    Referred to [facility] for confirmation that Rx was fabricated correctly, and for reassessment of bifocal placement in each lens.       "

## 2018-02-16 NOTE — PATIENT INSTRUCTIONS
S/P cataract surgery in both eyes.  Posterior chamber intraocular lens in both eyes.  S/P YAG laser posterior capsulotomy in the right eye.    Mrs. Jimenez in today with report that she failed vision test at 's license office.  Note that VA with glasses measured today is not as good as that obtained with refraction in 20/2017, or as good as obtained with refraction today.  Note no change in refractive error in each eye.    Referred to [facility] for confirmation that Rx was fabricated correctly, and for reassessment of bifocal placement in each lens.

## 2018-02-18 RX ORDER — WARFARIN 3 MG/1
TABLET ORAL
Qty: 120 TABLET | Refills: 0 | Status: SHIPPED | OUTPATIENT
Start: 2018-02-18 | End: 2018-03-11

## 2018-02-26 ENCOUNTER — ANTI-COAG VISIT (OUTPATIENT)
Dept: CARDIOLOGY | Facility: CLINIC | Age: 83
End: 2018-02-26
Payer: MEDICARE

## 2018-02-26 ENCOUNTER — CLINICAL SUPPORT (OUTPATIENT)
Dept: INTERNAL MEDICINE | Facility: CLINIC | Age: 83
End: 2018-02-26
Payer: MEDICARE

## 2018-02-26 DIAGNOSIS — Z79.01 CURRENT USE OF LONG TERM ANTICOAGULATION: Primary | ICD-10-CM

## 2018-02-26 DIAGNOSIS — I48.20 CHRONIC ATRIAL FIBRILLATION: ICD-10-CM

## 2018-02-26 DIAGNOSIS — J30.89 ALLERGIC RHINITIS DUE TO DUST MITE: ICD-10-CM

## 2018-02-26 LAB — INR PPP: 2.3 (ref 2–3)

## 2018-02-26 PROCEDURE — 99211 OFF/OP EST MAY X REQ PHY/QHP: CPT | Mod: 25,S$GLB,,

## 2018-02-26 PROCEDURE — 99499 UNLISTED E&M SERVICE: CPT | Mod: S$GLB,,, | Performed by: ALLERGY & IMMUNOLOGY

## 2018-02-26 PROCEDURE — 85610 PROTHROMBIN TIME: CPT | Mod: QW,S$GLB,,

## 2018-02-26 PROCEDURE — 95115 IMMUNOTHERAPY ONE INJECTION: CPT | Mod: S$GLB,,, | Performed by: FAMILY MEDICINE

## 2018-02-26 NOTE — PROGRESS NOTES
Patient here for allergy injection.  No new meds, no problems with last injection.    0.5mL of Red 1:1 given SQ in upper Right arm.  Tolerated well.    Patient assessed after 30 minutes.  1+ reaction noted at injection site.  No complaints voiced.    Scheduled appt for patient to see Dr. Henderson on 3/21/18 for yearly check and allergy vaccine renewal.

## 2018-03-05 ENCOUNTER — PES CALL (OUTPATIENT)
Dept: ADMINISTRATIVE | Facility: CLINIC | Age: 83
End: 2018-03-05

## 2018-03-11 ENCOUNTER — HOSPITAL ENCOUNTER (EMERGENCY)
Facility: HOSPITAL | Age: 83
Discharge: HOME | End: 2018-03-11
Attending: EMERGENCY MEDICINE
Payer: MEDICARE

## 2018-03-11 VITALS
HEART RATE: 83 BPM | HEIGHT: 66 IN | OXYGEN SATURATION: 98 % | SYSTOLIC BLOOD PRESSURE: 161 MMHG | RESPIRATION RATE: 18 BRPM | WEIGHT: 190 LBS | DIASTOLIC BLOOD PRESSURE: 70 MMHG | BODY MASS INDEX: 30.53 KG/M2 | TEMPERATURE: 98 F

## 2018-03-11 DIAGNOSIS — S09.93XA INJURY OF LIP, INITIAL ENCOUNTER: ICD-10-CM

## 2018-03-11 DIAGNOSIS — M25.552 BILATERAL HIP PAIN: ICD-10-CM

## 2018-03-11 DIAGNOSIS — M25.551 BILATERAL HIP PAIN: ICD-10-CM

## 2018-03-11 DIAGNOSIS — S89.91XA RIGHT KNEE INJURY: ICD-10-CM
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
## 2018-03-11 LAB
INR PPP: 2.2
PROTHROMBIN TIME: 21.5 SEC

## 2018-03-11 PROCEDURE — 90715 TDAP VACCINE 7 YRS/> IM: CPT | Performed by: EMERGENCY MEDICINE

## 2018-03-11 PROCEDURE — 99284 EMERGENCY DEPT VISIT MOD MDM: CPT

## 2018-03-11 PROCEDURE — 99284 EMERGENCY DEPT VISIT MOD MDM: CPT | Mod: ,,, | Performed by: EMERGENCY MEDICINE

## 2018-03-11 PROCEDURE — 85610 PROTHROMBIN TIME: CPT

## 2018-03-11 PROCEDURE — 63600175 PHARM REV CODE 636 W HCPCS: Performed by: EMERGENCY MEDICINE

## 2018-03-11 PROCEDURE — 90471 IMMUNIZATION ADMIN: CPT | Performed by: EMERGENCY MEDICINE

## 2018-03-11 RX ORDER — WARFARIN 3 MG/1
3.5 TABLET ORAL DAILY
COMMUNITY
End: 2019-01-21 | Stop reason: SDUPTHER

## 2018-03-11 RX ADMIN — CLOSTRIDIUM TETANI TOXOID ANTIGEN (FORMALDEHYDE INACTIVATED), CORYNEBACTERIUM DIPHTHERIAE TOXOID ANTIGEN (FORMALDEHYDE INACTIVATED), BORDETELLA PERTUSSIS TOXOID ANTIGEN (GLUTARALDEHYDE INACTIVATED), BORDETELLA PERTUSSIS FILAMENTOUS HEMAGGLUTININ ANTIGEN (FORMALDEHYDE INACTIVATED), BORDETELLA PERTUSSIS PERTACTIN ANTIGEN, AND BORDETELLA PERTUSSIS FIMBRIAE 2/3 ANTIGEN 0.5 ML: 5; 2; 2.5; 5; 3; 5 INJECTION, SUSPENSION INTRAMUSCULAR at 02:03

## 2018-03-11 NOTE — ED NOTES
Patient identifiers verified and correct for MS Jimenez  C/C: Pain to right knee, laceration to lip, soreness to right hip  APPEARANCE: awake and alert in NAD.  SKIN: warm, dry and intact. No breakdown or bruising.  MUSCULOSKELETAL: Patient moving all extremities spontaneously, no obvious swelling or deformities noted. Ambulates independently.  RESPIRATORY: Denies shortness of breath.Respirations unlabored.   CARDIAC: Denies CP, 2+ distal pulses; no peripheral edema  ABDOMEN: S/ND/NT, Denies nausea  : voids spontaneously, denies difficulty  Neurologic: AAO x 4; follows commands equal strength in all extremities; denies numbness/tingling. Denies dizziness Denies weakness except pain to left knee, positive sensation and mvmt to right foot, positive pedal pulse.
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
## 2018-03-11 NOTE — DISCHARGE INSTRUCTIONS
Your imaging today does not show any fractures or acute injuries. Please rest and use your walker at all times when you are moving around at home. Please follow the RICE protocol provided in the attached instructions. You may be sore over the next few days. You may use Tylenol to control your pain at home. Please follow-up with your primary care doctor if you continue to have pain.
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
## 2018-03-19 ENCOUNTER — HOSPITAL ENCOUNTER (OUTPATIENT)
Dept: RADIOLOGY | Facility: HOSPITAL | Age: 83
Discharge: HOME | End: 2018-03-19
Attending: INTERNAL MEDICINE
Payer: MEDICARE

## 2018-03-19 ENCOUNTER — OFFICE VISIT (OUTPATIENT)
Dept: INTERNAL MEDICINE | Facility: CLINIC | Age: 83
End: 2018-03-19
Payer: MEDICARE

## 2018-03-19 VITALS
HEART RATE: 72 BPM | WEIGHT: 196.19 LBS | HEIGHT: 66 IN | BODY MASS INDEX: 31.53 KG/M2 | DIASTOLIC BLOOD PRESSURE: 60 MMHG | TEMPERATURE: 98 F | SYSTOLIC BLOOD PRESSURE: 110 MMHG

## 2018-03-19 DIAGNOSIS — R07.81 RIB PAIN ON RIGHT SIDE: ICD-10-CM

## 2018-03-19 DIAGNOSIS — M54.5 LOW BACK PAIN, UNSPECIFIED BACK PAIN LATERALITY, UNSPECIFIED CHRONICITY, WITH SCIATICA PRESENCE UNSPECIFIED: ICD-10-CM
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
## 2018-03-19 DIAGNOSIS — M54.9 BACK PAIN, UNSPECIFIED BACK LOCATION, UNSPECIFIED BACK PAIN LATERALITY, UNSPECIFIED CHRONICITY: ICD-10-CM

## 2018-03-19 PROCEDURE — 71110 X-RAY EXAM RIBS BIL 3 VIEWS: CPT | Mod: 26,,, | Performed by: RADIOLOGY

## 2018-03-19 PROCEDURE — 96372 THER/PROPH/DIAG INJ SC/IM: CPT | Mod: S$GLB,,, | Performed by: INTERNAL MEDICINE

## 2018-03-19 PROCEDURE — 72100 X-RAY EXAM L-S SPINE 2/3 VWS: CPT | Mod: TC,PO

## 2018-03-19 PROCEDURE — 74018 RADEX ABDOMEN 1 VIEW: CPT | Mod: TC,PO

## 2018-03-19 PROCEDURE — 99214 OFFICE O/P EST MOD 30 MIN: CPT | Mod: 25,S$GLB,, | Performed by: INTERNAL MEDICINE

## 2018-03-19 PROCEDURE — 99999 PR PBB SHADOW E&M-EST. PATIENT-LVL III: CPT | Mod: PBBFAC,,, | Performed by: INTERNAL MEDICINE

## 2018-03-19 PROCEDURE — 71110 X-RAY EXAM RIBS BIL 3 VIEWS: CPT | Mod: TC,PO

## 2018-03-19 PROCEDURE — 72100 X-RAY EXAM L-S SPINE 2/3 VWS: CPT | Mod: 26,,, | Performed by: RADIOLOGY

## 2018-03-19 PROCEDURE — 74018 RADEX ABDOMEN 1 VIEW: CPT | Mod: 26,,, | Performed by: RADIOLOGY

## 2018-03-19 RX ORDER — TRIAMCINOLONE ACETONIDE 40 MG/ML
40 INJECTION, SUSPENSION INTRA-ARTICULAR; INTRAMUSCULAR
Status: COMPLETED | OUTPATIENT
Start: 2018-03-19 | End: 2018-03-19

## 2018-03-19 RX ORDER — DICLOFENAC SODIUM 10 MG/G
2 GEL TOPICAL DAILY
Qty: 1 TUBE | Refills: 1 | Status: SHIPPED | OUTPATIENT
Start: 2018-03-19 | End: 2018-04-06

## 2018-03-19 RX ORDER — TIZANIDINE 2 MG/1
TABLET ORAL
Qty: 30 TABLET | Refills: 1 | Status: SHIPPED | OUTPATIENT
Start: 2018-03-19 | End: 2018-04-06

## 2018-03-19 RX ORDER — TIZANIDINE 2 MG/1
4 TABLET ORAL EVERY 6 HOURS PRN
Qty: 30 TABLET | Refills: 1 | Status: SHIPPED | OUTPATIENT
Start: 2018-03-19 | End: 2018-03-19 | Stop reason: SDUPTHER

## 2018-03-19 RX ADMIN — TRIAMCINOLONE ACETONIDE 40 MG: 40 INJECTION, SUSPENSION INTRA-ARTICULAR; INTRAMUSCULAR at 09:03

## 2018-03-19 NOTE — PROGRESS NOTES
Subjective:       Patient ID: Jenniffer Jimenez is a 86 y.o. female.    Chief Complaint: No chief complaint on file.    Patient is a 86 y.o.female who presents today for a fall.    Her cat was disturbing her in the middle of the night. She fell asleep and she was dreaming that someone was trying to get into her house door. Next thing she knows, she fell onto the floor. Her bed is rather high off the floor. She fell onto the steps onto her right side of rib cage and back. This occurred on Saturday morning around 5 am. She cannot lie flat; she is sitting up at home.       She is taking tylenol for her discomfort. She rates it 10/10.  Review of Systems   Constitutional: Negative for appetite change, chills, diaphoresis, fatigue and fever.   HENT: Negative for congestion, dental problem, ear discharge, ear pain, hearing loss, postnasal drip, sinus pressure and sore throat.    Eyes: Negative for discharge, redness and itching.   Respiratory: Negative for cough, chest tightness, shortness of breath and wheezing.    Cardiovascular: Negative for chest pain, palpitations and leg swelling.   Gastrointestinal: Negative for abdominal pain, constipation, diarrhea, nausea and vomiting.   Endocrine: Negative for cold intolerance and heat intolerance.   Genitourinary: Negative for difficulty urinating, frequency, hematuria and urgency.   Musculoskeletal: Positive for arthralgias, back pain and myalgias. Negative for gait problem and neck pain.   Skin: Negative for color change and rash.   Neurological: Negative for dizziness, syncope and headaches.   Hematological: Negative for adenopathy.   Psychiatric/Behavioral: Negative for behavioral problems and sleep disturbance. The patient is not nervous/anxious.        Objective:      Physical Exam   Constitutional: She is oriented to person, place, and time. She appears well-developed and well-nourished. No distress.   HENT:   Head: Normocephalic and atraumatic.   Right Ear: External  ear normal.   Left Ear: External ear normal.   Nose: Nose normal.   Mouth/Throat: Oropharynx is clear and moist. No oropharyngeal exudate.   Eyes: Conjunctivae and EOM are normal. Pupils are equal, round, and reactive to light. Right eye exhibits no discharge. Left eye exhibits no discharge. No scleral icterus.   Neck: Normal range of motion. Neck supple. No JVD present. No thyromegaly present.   Cardiovascular: Normal rate, regular rhythm, normal heart sounds and intact distal pulses.  Exam reveals no gallop and no friction rub.    No murmur heard.  Pulmonary/Chest: Effort normal and breath sounds normal. No stridor. No respiratory distress. She has no wheezes. She has no rales. She exhibits no tenderness.   Abdominal: Soft. Bowel sounds are normal. She exhibits no distension. There is no tenderness. There is no rebound.   Musculoskeletal: She exhibits no edema.        Right shoulder: She exhibits decreased range of motion and tenderness.        Arms:  Lymphadenopathy:     She has no cervical adenopathy.   Neurological: She is alert and oriented to person, place, and time. No cranial nerve deficit.   Skin: Skin is warm and dry. No rash noted. She is not diaphoretic. No erythema.   Psychiatric: She has a normal mood and affect. Her behavior is normal.   Nursing note and vitals reviewed.      Assessment and Plan:       1. Fall, initial encounter    - diclofenac sodium (VOLTAREN) 1 % Gel; Apply 2 g topically once daily.  Dispense: 1 Tube; Refill: 1  - triamcinolone acetonide injection 40 mg; Inject 1 mL (40 mg total) into the muscle one time.  - tiZANidine (ZANAFLEX) 2 MG tablet; Take 2 tablets (4 mg total) by mouth every 6 (six) hours as needed (muscle pain).  Dispense: 30 tablet; Refill: 1  - X-Ray Ribs 3 Views Bilateral; Future  - X-Ray Lumbar Spine Ap And Lateral; Future  - X-Ray Abdomen AP 1 View; Future    2. Rib pain on right side    - diclofenac sodium (VOLTAREN) 1 % Gel; Apply 2 g topically once daily.   Dispense: 1 Tube; Refill: 1  - triamcinolone acetonide injection 40 mg; Inject 1 mL (40 mg total) into the muscle one time.  - tiZANidine (ZANAFLEX) 2 MG tablet; Take 2 tablets (4 mg total) by mouth every 6 (six) hours as needed (muscle pain).  Dispense: 30 tablet; Refill: 1  - X-Ray Ribs 3 Views Bilateral; Future  - X-Ray Lumbar Spine Ap And Lateral; Future  - X-Ray Abdomen AP 1 View; Future    3. Back pain, unspecified back location, unspecified back pain laterality, unspecified chronicity    - diclofenac sodium (VOLTAREN) 1 % Gel; Apply 2 g topically once daily.  Dispense: 1 Tube; Refill: 1  - triamcinolone acetonide injection 40 mg; Inject 1 mL (40 mg total) into the muscle one time.  - tiZANidine (ZANAFLEX) 2 MG tablet; Take 2 tablets (4 mg total) by mouth every 6 (six) hours as needed (muscle pain).  Dispense: 30 tablet; Refill: 1  - X-Ray Ribs 3 Views Bilateral; Future  - X-Ray Lumbar Spine Ap And Lateral; Future  - X-Ray Abdomen AP 1 View; Future    4. Low back pain, unspecified back pain laterality, unspecified chronicity, with sciatica presence unspecified    - diclofenac sodium (VOLTAREN) 1 % Gel; Apply 2 g topically once daily.  Dispense: 1 Tube; Refill: 1  - triamcinolone acetonide injection 40 mg; Inject 1 mL (40 mg total) into the muscle one time.  - tiZANidine (ZANAFLEX) 2 MG tablet; Take 2 tablets (4 mg total) by mouth every 6 (six) hours as needed (muscle pain).  Dispense: 30 tablet; Refill: 1  - X-Ray Ribs 3 Views Bilateral; Future  - X-Ray Lumbar Spine Ap And Lateral; Future  - X-Ray Abdomen AP 1 View; Future      - check xrays to rule out rib fracture  - no ace bandage to ribs  - alternate heat and ice  - cannot tolerate tramadol, nsaids or codeine  - tylenol ES every 6 hours prn pain  - voltaren gel prn  - tizanidine 2 mg po every six hours prn spasm; may cause drowsiness  - kenalog 40 mg IM x 1  - rest, fluids  - rtc if symptoms worsen or do not improve          No Follow-up on file.
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
## 2018-03-19 NOTE — PROGRESS NOTES
Patient is already scheduled for other appts on 3/21, so it should be convenient for her to get an INR that day. Please advise her that her INR may be high right now and increasing her chance of bleeding internal. If she refuses despite knowing the risks, that's up to her.

## 2018-03-19 NOTE — PROGRESS NOTES
Patient was advised the 2 meds are fine, was given change of next lab date, Patient refused appt. due to being in too much pain and not being able to get around

## 2018-03-20 NOTE — PROGRESS NOTES
Patient went ahead and tentatively scheduled an appointment with coumadin clinic for 3/21, states she did have X-rays done 3/19

## 2018-03-21 ENCOUNTER — OUTPATIENT CASE MANAGEMENT (OUTPATIENT)
Dept: ADMINISTRATIVE | Facility: OTHER | Age: 83
End: 2018-03-21

## 2018-03-21 ENCOUNTER — ANTI-COAG VISIT (OUTPATIENT)
Dept: CARDIOLOGY | Facility: CLINIC | Age: 83
End: 2018-03-21
Payer: MEDICARE

## 2018-03-21 DIAGNOSIS — I48.20 CHRONIC ATRIAL FIBRILLATION: ICD-10-CM

## 2018-03-21 DIAGNOSIS — Z79.01 CURRENT USE OF LONG TERM ANTICOAGULATION: ICD-10-CM

## 2018-03-21 LAB — INR PPP: 4.9 (ref 2–3)

## 2018-03-21 PROCEDURE — 99211 OFF/OP EST MAY X REQ PHY/QHP: CPT | Mod: 25,S$GLB,,

## 2018-03-21 PROCEDURE — 85610 PROTHROMBIN TIME: CPT | Mod: QW,S$GLB,,
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
## 2018-03-21 NOTE — PROGRESS NOTES
The following patient has been assigned to Olive Holder RN with Outpatient Complex Care Management for high risk screening.    Reason: High Risk  Seen in clinic    Please contact OPCM at [phone] with any questions.    Thank you,    Heather Chowdary, [facility]  Outpatient Complex Care Mgmt  Ext [phone]

## 2018-03-22 ENCOUNTER — TELEPHONE (OUTPATIENT)
Dept: INTERNAL MEDICINE | Facility: CLINIC | Age: 83
End: 2018-03-22

## 2018-03-22 NOTE — TELEPHONE ENCOUNTER
----- Message from Alexandra Polo sent at 3/22/2018  1:25 PM CDT -----  Contact: Pt [phone]  Pt said the script for diclofenac sodium (VOLTAREN) 1 % Gel didn't help her and she couldn't take it while using her heating pad. Pt said she's taking the script tiZANidine (ZANAFLEX) 2 MG tablet and it's helping.

## 2018-03-26 ENCOUNTER — ANTI-COAG VISIT (OUTPATIENT)
Dept: CARDIOLOGY | Facility: CLINIC | Age: 83
End: 2018-03-26
Payer: MEDICARE

## 2018-03-26 ENCOUNTER — CLINICAL SUPPORT (OUTPATIENT)
Dept: INTERNAL MEDICINE | Facility: CLINIC | Age: 83
End: 2018-03-26
Payer: MEDICARE

## 2018-03-26 ENCOUNTER — OFFICE VISIT (OUTPATIENT)
Dept: DERMATOLOGY | Facility: CLINIC | Age: 83
End: 2018-03-26
Payer: MEDICARE

## 2018-03-26 VITALS — WEIGHT: 196 LBS | BODY MASS INDEX: 31.64 KG/M2

## 2018-03-26 DIAGNOSIS — Z79.01 CURRENT USE OF LONG TERM ANTICOAGULATION: Primary | ICD-10-CM

## 2018-03-26 DIAGNOSIS — L82.1 SEBORRHEIC KERATOSES: Primary | ICD-10-CM

## 2018-03-26 DIAGNOSIS — I48.20 CHRONIC ATRIAL FIBRILLATION: ICD-10-CM

## 2018-03-26 DIAGNOSIS — L21.9 SEBORRHEIC DERMATITIS: ICD-10-CM

## 2018-03-26 DIAGNOSIS — L71.9 ROSACEA: ICD-10-CM

## 2018-03-26 DIAGNOSIS — J30.89 ALLERGIC RHINITIS DUE TO AMERICAN HOUSE DUST MITE: ICD-10-CM

## 2018-03-26 LAB — INR PPP: 2.9 (ref 2–3)

## 2018-03-26 PROCEDURE — 85610 PROTHROMBIN TIME: CPT | Mod: QW,S$GLB,, | Performed by: INTERNAL MEDICINE

## 2018-03-26 PROCEDURE — 99212 OFFICE O/P EST SF 10 MIN: CPT | Mod: S$GLB,,, | Performed by: DERMATOLOGY

## 2018-03-26 PROCEDURE — 95115 IMMUNOTHERAPY ONE INJECTION: CPT | Mod: S$GLB,,, | Performed by: FAMILY MEDICINE

## 2018-03-26 PROCEDURE — 99499 UNLISTED E&M SERVICE: CPT | Mod: S$GLB,,, | Performed by: ALLERGY & IMMUNOLOGY

## 2018-03-26 PROCEDURE — 99999 PR PBB SHADOW E&M-EST. PATIENT-LVL III: CPT | Mod: PBBFAC,,, | Performed by: DERMATOLOGY

## 2018-03-26 NOTE — PROGRESS NOTES
Subjective:       Patient ID:  Jenniffer Jimenez is a 86 y.o. female who presents for   Chief Complaint   Patient presents with    Lesion     scalp     History of Present Illness: The patient presents with chief complaint of spot.  Location: scalp  Duration: months  Signs/Symptoms: none    Prior treatments: none          Review of Systems   Constitutional: Negative for fever.   Skin: Negative for itching and rash.   Hematologic/Lymphatic: Bruises/bleeds easily.        Objective:    Physical Exam   Constitutional: She appears well-developed and well-nourished. No distress.   Neurological: She is alert and oriented to person, place, and time. She is not disoriented.   Psychiatric: She has a normal mood and affect.   Skin:   Areas Examined (abnormalities noted in diagram):   Scalp / Hair Palpated and Inspected  Head / Face Inspection Performed  Neck Inspection Performed              Diagram Legend     Erythematous scaling macule/papule c/w actinic keratosis       Vascular papule c/w angioma      Pigmented verrucoid papule/plaque c/w seborrheic keratosis      Yellow umbilicated papule c/w sebaceous hyperplasia      Irregularly shaped tan macule c/w lentigo     1-2 mm smooth white papules consistent with Milia      Movable subcutaneous cyst with punctum c/w epidermal inclusion cyst      Subcutaneous movable cyst c/w pilar cyst      Firm pink to brown papule c/w dermatofibroma      Pedunculated fleshy papule(s) c/w skin tag(s)      Evenly pigmented macule c/w junctional nevus     Mildly variegated pigmented, slightly irregular-bordered macule c/w mildly atypical nevus      Flesh colored to evenly pigmented papule c/w intradermal nevus       Pink pearly papule/plaque c/w basal cell carcinoma      Erythematous hyperkeratotic cursted plaque c/w SCC      Surgical scar with no sign of skin cancer recurrence      Open and closed comedones      Inflammatory papules and pustules      Verrucoid papule consistent consistent  with wart     Erythematous eczematous patches and plaques     Dystrophic onycholytic nail with subungual debris c/w onychomycosis     Umbilicated papule    Erythematous-base heme-crusted tan verrucoid plaque consistent with inflamed seborrheic keratosis     Erythematous Silvery Scaling Plaque c/w Psoriasis     See annotation      Assessment / Plan:        Seborrheic keratoses  reassurance      Rosacea/Seborrheic dermatitis  Cont nizoral cream prn             Follow-up if symptoms worsen or fail to improve.

## 2018-03-26 NOTE — PROGRESS NOTES
Patient here for allergy injection.  No new meds, no problems with last injection.    0.5mL Red 1:1 given SQ in upper right arm.  Tolerated well.    Site assessed after 30 minutes.  1+ reaction of erythema noted.  No complaints voiced.

## 2018-03-26 NOTE — PROGRESS NOTES
Pt seen by Melvina SERRATO. I have reviewed her documentation and agree with her assessment and plan.
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
## 2018-03-27 ENCOUNTER — OUTPATIENT CASE MANAGEMENT (OUTPATIENT)
Dept: ADMINISTRATIVE | Facility: OTHER | Age: 83
End: 2018-03-27

## 2018-03-27 NOTE — PROGRESS NOTES
3/27/18  Summary:  1st Attempt to complete initial assessment for Outpatient Care Management. Pt declines OPCM.     Interventions:  Explained in depth the purpose of referral and OPCM services (RNs & LCSWs) as part of her healthcare team at [facility] .                 Mailed contact for CM and OPCM Brochure.   Notified Dr. Rubi Hernandez, PCP.    Plan:  None.

## 2018-04-05 ENCOUNTER — TELEPHONE (OUTPATIENT)
Dept: INTERNAL MEDICINE | Facility: CLINIC | Age: 83
End: 2018-04-05

## 2018-04-05 NOTE — TELEPHONE ENCOUNTER
----- Message from Micaela Chiang sent at 4/5/2018 11:40 AM CDT -----  Contact: self/[phone]  Pt found a lump the size of and egg behind her left arm and would like to talk to the office about it.        Please advise

## 2018-04-06 ENCOUNTER — OFFICE VISIT (OUTPATIENT)
Dept: INTERNAL MEDICINE | Facility: CLINIC | Age: 83
End: 2018-04-06
Payer: MEDICARE

## 2018-04-06 VITALS
RESPIRATION RATE: 16 BRPM | DIASTOLIC BLOOD PRESSURE: 72 MMHG | TEMPERATURE: 98 F | SYSTOLIC BLOOD PRESSURE: 134 MMHG | HEART RATE: 52 BPM | BODY MASS INDEX: 29.62 KG/M2 | HEIGHT: 66 IN | WEIGHT: 184.31 LBS

## 2018-04-06 DIAGNOSIS — D17.1 LIPOMA OF TORSO: Primary | ICD-10-CM

## 2018-04-06 PROCEDURE — 99999 PR PBB SHADOW E&M-EST. PATIENT-LVL III: CPT | Mod: PBBFAC,,, | Performed by: FAMILY MEDICINE

## 2018-04-06 PROCEDURE — 99213 OFFICE O/P EST LOW 20 MIN: CPT | Mod: S$GLB,,, | Performed by: FAMILY MEDICINE

## 2018-04-09 ENCOUNTER — ANTI-COAG VISIT (OUTPATIENT)
Dept: CARDIOLOGY | Facility: CLINIC | Age: 83
End: 2018-04-09
Payer: MEDICARE

## 2018-04-09 DIAGNOSIS — Z79.01 CURRENT USE OF LONG TERM ANTICOAGULATION: Primary | ICD-10-CM

## 2018-04-09 DIAGNOSIS — I48.20 CHRONIC ATRIAL FIBRILLATION: ICD-10-CM

## 2018-04-09 LAB — INR PPP: 1.8 (ref 2–3)

## 2018-04-09 PROCEDURE — 85610 PROTHROMBIN TIME: CPT | Mod: QW,S$GLB,, | Performed by: INTERNAL MEDICINE

## 2018-04-09 NOTE — PROGRESS NOTES
INR subtherapeutic today. Patient reports eating more salad with viky lettuce over the past week. She does not intend to continue with this diet. She denies any bleeding, bruising or other changes that may affect warfarin therapy. Will boost dose today then resume weekly dose. Patient advised to call coumadin clinic with any changes.

## 2018-04-09 NOTE — PROGRESS NOTES
Pt seen by Jade SERRATO. I have reviewed her initial findings and agree with her assessment and plan

## 2018-04-18 ENCOUNTER — OFFICE VISIT (OUTPATIENT)
Dept: ALLERGY | Facility: CLINIC | Age: 83
End: 2018-04-18
Payer: MEDICARE

## 2018-04-18 VITALS
BODY MASS INDEX: 30.33 KG/M2 | HEIGHT: 66 IN | WEIGHT: 188.69 LBS | DIASTOLIC BLOOD PRESSURE: 62 MMHG | SYSTOLIC BLOOD PRESSURE: 140 MMHG

## 2018-04-18 DIAGNOSIS — J30.89 CHRONIC NONSEASONAL ALLERGIC RHINITIS DUE TO POLLEN: ICD-10-CM

## 2018-04-18 DIAGNOSIS — J30.9 CHRONIC ALLERGIC RHINITIS, UNSPECIFIED SEASONALITY, UNSPECIFIED TRIGGER: Primary | ICD-10-CM

## 2018-04-18 DIAGNOSIS — H81.09 MENIERE'S DISEASE, UNSPECIFIED LATERALITY: ICD-10-CM

## 2018-04-18 PROCEDURE — 99214 OFFICE O/P EST MOD 30 MIN: CPT | Mod: GC,S$GLB,, | Performed by: ALLERGY & IMMUNOLOGY

## 2018-04-18 PROCEDURE — 99999 PR PBB SHADOW E&M-EST. PATIENT-LVL III: CPT | Mod: PBBFAC,GC,, | Performed by: ALLERGY & IMMUNOLOGY
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
## 2018-04-19 ENCOUNTER — NURSE TRIAGE (OUTPATIENT)
Dept: ADMINISTRATIVE | Facility: CLINIC | Age: 83
End: 2018-04-19

## 2018-04-19 ENCOUNTER — TELEPHONE (OUTPATIENT)
Dept: ALLERGY | Facility: CLINIC | Age: 83
End: 2018-04-19

## 2018-04-19 ENCOUNTER — TELEPHONE (OUTPATIENT)
Dept: CARDIOLOGY | Facility: CLINIC | Age: 83
End: 2018-04-19

## 2018-04-19 NOTE — TELEPHONE ENCOUNTER
If the discomfort as lasting only a few seconds that would not worry about it even though it recurred several times

## 2018-04-19 NOTE — TELEPHONE ENCOUNTER
Pt states she experienced 5 episodes of chest discomfort this AM. Pt states each episode lasted for only 1-2 seconds in duration. Pt states she took Aspirin 81mg and her usual furosemide dose. Pt states she is feeling fine now, denies any chest discomfort at this time. Pt asking if she should be concerned and asking if she should keep Aspirin on hand at home to take if needed in the future and if so which strength Asprin should she keep on hand. Please advise

## 2018-04-19 NOTE — TELEPHONE ENCOUNTER
Sent text to Dr. Basurto that as soon as pt's allergy refills are put in to the system, and are made, I can give pt her allergy injections.  She would like them by 5/4.

## 2018-04-19 NOTE — TELEPHONE ENCOUNTER
----- Message from Danica Basurto MD sent at 4/18/2018  9:12 AM CDT -----  Hi Ms. Leon,   I had the pleasure of meeting this patient today and she requests that I check in with you about whether or not her extract will be ready for May 4th - anything I need to do to assist with this?   Thank you!  Nirmala Basurto

## 2018-04-20 NOTE — TELEPHONE ENCOUNTER
Reason for Disposition   [1] MODERATE pain (e.g., interferes with normal activities) AND [2] present > 3 days    Protocols used: ST MUSCLE ACHES AND BODY PAIN-A-AH

## 2018-04-26 ENCOUNTER — ANTI-COAG VISIT (OUTPATIENT)
Dept: CARDIOLOGY | Facility: CLINIC | Age: 83
End: 2018-04-26
Payer: MEDICARE

## 2018-04-26 ENCOUNTER — LAB VISIT (OUTPATIENT)
Dept: LAB | Facility: HOSPITAL | Age: 83
End: 2018-04-26
Payer: MEDICARE

## 2018-04-26 ENCOUNTER — CLINICAL SUPPORT (OUTPATIENT)
Dept: ALLERGY | Facility: CLINIC | Age: 83
End: 2018-04-26
Payer: MEDICARE

## 2018-04-26 DIAGNOSIS — E78.00 PURE HYPERCHOLESTEROLEMIA: Chronic | ICD-10-CM

## 2018-04-26 DIAGNOSIS — I48.20 CHRONIC ATRIAL FIBRILLATION: ICD-10-CM

## 2018-04-26 DIAGNOSIS — I50.32 CHRONIC DIASTOLIC HEART FAILURE: ICD-10-CM

## 2018-04-26 DIAGNOSIS — J30.9 CHRONIC ALLERGIC RHINITIS: Primary | ICD-10-CM

## 2018-04-26 DIAGNOSIS — Z79.01 CURRENT USE OF LONG TERM ANTICOAGULATION: Primary | ICD-10-CM

## 2018-04-26 DIAGNOSIS — N18.30 CHRONIC KIDNEY DISEASE, STAGE III (MODERATE): ICD-10-CM

## 2018-04-26 LAB
ALBUMIN SERPL BCP-MCNC: 3.6 G/DL
ALP SERPL-CCNC: 123 U/L
ALT SERPL W/O P-5'-P-CCNC: 17 U/L
ANION GAP SERPL CALC-SCNC: 8 MMOL/L
AST SERPL-CCNC: 17 U/L
BILIRUB SERPL-MCNC: 0.6 MG/DL
BUN SERPL-MCNC: 25 MG/DL
CALCIUM SERPL-MCNC: 9.3 MG/DL
CHLORIDE SERPL-SCNC: 103 MMOL/L
CHOLEST SERPL-MCNC: 213 MG/DL
CHOLEST/HDLC SERPL: 2.7 {RATIO}
CO2 SERPL-SCNC: 29 MMOL/L
CREAT SERPL-MCNC: 1 MG/DL
EST. GFR  (AFRICAN AMERICAN): 58.9 ML/MIN/1.73 M^2
EST. GFR  (NON AFRICAN AMERICAN): 51.1 ML/MIN/1.73 M^2
GLUCOSE SERPL-MCNC: 119 MG/DL
HDLC SERPL-MCNC: 78 MG/DL
HDLC SERPL: 36.6 %
INR PPP: 1.6 (ref 2–3)
LDLC SERPL CALC-MCNC: 120.2 MG/DL
NONHDLC SERPL-MCNC: 135 MG/DL
POTASSIUM SERPL-SCNC: 4 MMOL/L
PROT SERPL-MCNC: 6.8 G/DL
SODIUM SERPL-SCNC: 140 MMOL/L
TRIGL SERPL-MCNC: 74 MG/DL

## 2018-04-26 PROCEDURE — 99499 UNLISTED E&M SERVICE: CPT | Mod: S$GLB,,, | Performed by: ALLERGY & IMMUNOLOGY

## 2018-04-26 PROCEDURE — 80061 LIPID PANEL: CPT

## 2018-04-26 PROCEDURE — 36415 COLL VENOUS BLD VENIPUNCTURE: CPT

## 2018-04-26 PROCEDURE — 99211 OFF/OP EST MAY X REQ PHY/QHP: CPT | Mod: 25,S$GLB,, | Performed by: PHARMACIST

## 2018-04-26 PROCEDURE — 80053 COMPREHEN METABOLIC PANEL: CPT

## 2018-04-26 PROCEDURE — 85610 PROTHROMBIN TIME: CPT | Mod: QW,S$GLB,, | Performed by: PHARMACIST

## 2018-04-26 PROCEDURE — 95165 ANTIGEN THERAPY SERVICES: CPT | Mod: S$GLB,,, | Performed by: ALLERGY & IMMUNOLOGY

## 2018-04-26 NOTE — PROGRESS NOTES
INR continues to hover low. Patient denies any changes in diet, medications, or health that would effect warfarin therapy. We will increase her weekly coumadin dose at this time. Patient was re-educated on situations that would require placing a call to the coumadin clinic, including bleeding or unusual bruising issues, changes in health, diet or medications, upcoming procedures that require warfarin interruption, and missed coumadin dose(s). Patient expressed understanding that avoidance of consistency with these parameters could cause fluctuations in INR, leading to more frequent visits and increase risk of adverse events.

## 2018-05-02 ENCOUNTER — PATIENT MESSAGE (OUTPATIENT)
Dept: CARDIOLOGY | Facility: CLINIC | Age: 83
End: 2018-05-02

## 2018-05-04 ENCOUNTER — OFFICE VISIT (OUTPATIENT)
Dept: CARDIOLOGY | Facility: CLINIC | Age: 83
End: 2018-05-04
Payer: MEDICARE

## 2018-05-04 ENCOUNTER — CLINICAL SUPPORT (OUTPATIENT)
Dept: INTERNAL MEDICINE | Facility: CLINIC | Age: 83
End: 2018-05-04
Payer: MEDICARE

## 2018-05-04 VITALS
HEART RATE: 76 BPM | HEIGHT: 66 IN | SYSTOLIC BLOOD PRESSURE: 132 MMHG | DIASTOLIC BLOOD PRESSURE: 70 MMHG | WEIGHT: 186.06 LBS | BODY MASS INDEX: 29.9 KG/M2

## 2018-05-04 DIAGNOSIS — R69 DIAGNOSIS DEFERRED: ICD-10-CM

## 2018-05-04 DIAGNOSIS — I50.32 CHRONIC DIASTOLIC HEART FAILURE: ICD-10-CM

## 2018-05-04 DIAGNOSIS — I48.20 CHRONIC ATRIAL FIBRILLATION: Primary | ICD-10-CM

## 2018-05-04 DIAGNOSIS — J30.89 NON-SEASONAL ALLERGIC RHINITIS DUE TO FUNGAL SPORES: ICD-10-CM

## 2018-05-04 DIAGNOSIS — I10 ESSENTIAL HYPERTENSION: ICD-10-CM

## 2018-05-04 DIAGNOSIS — E78.00 PURE HYPERCHOLESTEROLEMIA: ICD-10-CM

## 2018-05-04 DIAGNOSIS — Z79.01 LONG TERM (CURRENT) USE OF ANTICOAGULANTS: ICD-10-CM

## 2018-05-04 PROCEDURE — 99499 UNLISTED E&M SERVICE: CPT | Mod: S$PBB,,, | Performed by: INTERNAL MEDICINE

## 2018-05-04 PROCEDURE — 93010 ELECTROCARDIOGRAM REPORT: CPT | Mod: S$GLB,,, | Performed by: INTERNAL MEDICINE

## 2018-05-04 PROCEDURE — 99999 PR PBB SHADOW E&M-EST. PATIENT-LVL III: CPT | Mod: PBBFAC,,, | Performed by: INTERNAL MEDICINE

## 2018-05-04 PROCEDURE — 93005 ELECTROCARDIOGRAM TRACING: CPT | Mod: S$GLB,,, | Performed by: INTERNAL MEDICINE

## 2018-05-04 PROCEDURE — 99499 UNLISTED E&M SERVICE: CPT | Mod: S$GLB,,, | Performed by: ALLERGY & IMMUNOLOGY

## 2018-05-04 PROCEDURE — 99214 OFFICE O/P EST MOD 30 MIN: CPT | Mod: S$GLB,,, | Performed by: INTERNAL MEDICINE

## 2018-05-04 PROCEDURE — 95115 IMMUNOTHERAPY ONE INJECTION: CPT | Mod: S$GLB,,, | Performed by: FAMILY MEDICINE

## 2018-05-04 NOTE — PROGRESS NOTES
Patient here for allergy injection.  No new meds, no problems with last injection.    0.25mL Red 1:1 given SQ in upper right arm.  Tolerated well.  Patient backed up due to new vial.    Site assessed after 30 minutes.  0reaction noted.  No complaints voiced.
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
## 2018-05-04 NOTE — PATIENT INSTRUCTIONS
LOW-SALT DIET (2 grams/day)   This diet eliminates foods that are high in salt and restricts the amount of salt that you cook with. It is most often used for patients with high blood pressure, edema (fluid retention), kidney, liver, and heart disease.   Table salt contains the mineral sodium. The body needs a little bit of sodium to work normally. But too much sodium can make your health problems worse. Your total daily allowance of salt (sodium) is 2 grams. This equals 2000 milligrams (mg). This is about a teaspoon of salt. This means you can have only about 500 mg of sodium at each meal. Most individuals get excessive sodium from snacks. Look carefully for sodium levels at or around 250 mg per serving and do not eat more than 1 serving. Really low-sodium snacks contain less than 100 mg per serving.      When you cook, limit the salt you use. And if you can avoid using salt, even better. Do not add salt at the table. So, throw away the saltshaker!   When shopping, read the package labels. Salt is often called sodium on the label. Choose foods that are Salt-Free, Low Salt, or Very Low Salt. Note that foods with Reduced Salt may not lower your salt intake enough.     BEVERAGES OK: Tea, coffee, carbonated beverages, juices   AVOID: Flavored international coffees, electrolyte replacement drinks, sports beverages     BREAD & CEREALS OK: All regular bread, rolls, cereals, cakes; low-salt crackers, matzoh crackers   AVOID: Salted crackers, pretzels, popcorn; [place] toast, pancakes, muffins     FRUITS & DESSERTS OK: Ice cream, frozen yogurt, juice bars, gelatin (Jell-O), cookies and pies, sugar, honey, jelly, hard candy   AVOID: Most pies, cakes and cookies prepared or processed with salt, instant pudding     MEATS OK: All fresh meat, fish, poultry, low-salt tuna   AVOID: Smoked, pickled, brine-cured, or salted meats or fish. This includes foley, chipped beef, corned beef, hot dogs, luncheon meats, ham, kosher meats,  salt pork, sausage, canned tuna, salted codfish, smoked salmon, herring, sardines, or anchovies.     DAIRY OK: Milk, chocolate milk, hot chocolate mix; eggs, Low Salt cheeses, yogurt, egg substitute   AVOID: Processed cheese, cheese spreads, Roquefort, Camembert, and cottage cheese, buttermilk, instant breakfast drink     BEANS, POTATOES & PASTA OK: Dry beans, split peas, lentils, potatoes, rice, macaroni, noodles, spaghetti without added salt   AVOID: Potato chips, tortilla chips, and similar products     SOUPS OK: Low-salt soups and broths made with allowed foods   AVOID: Bouillon cubes, soups with smoked or salted meats, regular soup and broth     VEGETABLES OK: Most are okay; low-salt tomato and vegetable juices   AVOID: Sauerkraut and other brine-soaked vegetables, pickles and other pickled vegetables, tomato juice, olives       SEASONING & SPICES: OK: Most seasonings are okay. Good substitutes for salt include: fresh herb blends, Tabasco, lemon, garlic, hitchcock, vinegar, dry mustard, parsley, cilantro, horseradish, tomato paste, regular margarine, mayonnaise, butter, cream cheese, vegetable oil, cream, low-salt salad dressing and gravy   AVOID: Regular ketchup, relishes, pickles, soy sauce, teriyaki sauce, Worcestershire sauce, BBQ sauce, tartar sauce, meat tenderizer, chili sauce, regular gravy, regular salad dressing   © 2433-9182 [facility], [address] [place] [address], [place] [address]. All rights reserved. This information is not intended as a substitute for professional medical care. Always follow your healthcare professional's instructions.

## 2018-05-10 ENCOUNTER — ANTI-COAG VISIT (OUTPATIENT)
Dept: CARDIOLOGY | Facility: CLINIC | Age: 83
End: 2018-05-10
Payer: MEDICARE

## 2018-05-10 DIAGNOSIS — I48.20 CHRONIC ATRIAL FIBRILLATION: ICD-10-CM

## 2018-05-10 DIAGNOSIS — Z79.01 CURRENT USE OF LONG TERM ANTICOAGULATION: Primary | ICD-10-CM

## 2018-05-10 LAB — INR PPP: 2 (ref 2–3)

## 2018-05-10 PROCEDURE — 85610 PROTHROMBIN TIME: CPT | Mod: QW,S$GLB,, | Performed by: INTERNAL MEDICINE

## 2018-05-11 ENCOUNTER — CLINICAL SUPPORT (OUTPATIENT)
Dept: INTERNAL MEDICINE | Facility: CLINIC | Age: 83
End: 2018-05-11
Payer: MEDICARE

## 2018-05-11 DIAGNOSIS — J30.89 CHRONIC ALLERGIC RHINITIS DUE TO FUNGAL SPORES: ICD-10-CM

## 2018-05-11 PROCEDURE — 95115 IMMUNOTHERAPY ONE INJECTION: CPT | Mod: S$GLB,,, | Performed by: FAMILY MEDICINE

## 2018-05-11 PROCEDURE — 99499 UNLISTED E&M SERVICE: CPT | Mod: S$GLB,,, | Performed by: ALLERGY & IMMUNOLOGY

## 2018-05-11 NOTE — PROGRESS NOTES
Patient here for allergy injection.  No new meds, no problems with last injection.    0.3mL Red 1:1 given SQ in upper right arm.  Tolerated well.    Site assessed after 30 minutes.  0 reaction noted.  No complaints voiced.

## 2018-05-15 RX ORDER — WARFARIN 3 MG/1
TABLET ORAL
Qty: 120 TABLET | Refills: 0 | Status: SHIPPED | OUTPATIENT
Start: 2018-05-15 | End: 2018-08-04 | Stop reason: SDUPTHER

## 2018-05-18 ENCOUNTER — CLINICAL SUPPORT (OUTPATIENT)
Dept: INTERNAL MEDICINE | Facility: CLINIC | Age: 83
End: 2018-05-18
Payer: MEDICARE

## 2018-05-18 DIAGNOSIS — J31.0 CHRONIC RHINITIS: ICD-10-CM

## 2018-05-18 PROCEDURE — 99499 UNLISTED E&M SERVICE: CPT | Mod: S$GLB,,, | Performed by: ALLERGY & IMMUNOLOGY

## 2018-05-18 PROCEDURE — 95115 IMMUNOTHERAPY ONE INJECTION: CPT | Mod: S$GLB,,, | Performed by: FAMILY MEDICINE

## 2018-05-18 NOTE — PROGRESS NOTES
Patient here for allergy injection.  No new meds, no problems with last injection.    0.35mL Red 1:1 given SQ in upper right arm.  Tolerated well.    Site assessed after 30 minutes.  0 reaction noted.  No complaints voiced.

## 2018-05-24 ENCOUNTER — ANTI-COAG VISIT (OUTPATIENT)
Dept: CARDIOLOGY | Facility: CLINIC | Age: 83
End: 2018-05-24
Payer: MEDICARE

## 2018-05-24 DIAGNOSIS — Z79.01 CURRENT USE OF LONG TERM ANTICOAGULATION: Primary | ICD-10-CM

## 2018-05-24 DIAGNOSIS — I48.20 CHRONIC ATRIAL FIBRILLATION: ICD-10-CM

## 2018-05-24 LAB — INR PPP: 3.6 (ref 2–3)

## 2018-05-24 PROCEDURE — 99211 OFF/OP EST MAY X REQ PHY/QHP: CPT | Mod: 25,S$GLB,, | Performed by: PHARMACIST

## 2018-05-24 PROCEDURE — 85610 PROTHROMBIN TIME: CPT | Mod: QW,S$GLB,, | Performed by: PHARMACIST

## 2018-05-24 NOTE — PROGRESS NOTES
INR elevated today. Patient reports having diarrhea this morning. She denies any bleeding, bruising or other changes that may affect warfarin therapy. Will hold dose tonight then lower her weekly dose. Patient reminded to call coumadin clinic with any changes or concerns.

## 2018-05-28 ENCOUNTER — CLINICAL SUPPORT (OUTPATIENT)
Dept: INTERNAL MEDICINE | Facility: CLINIC | Age: 83
End: 2018-05-28
Payer: MEDICARE

## 2018-05-28 DIAGNOSIS — J31.0 CHRONIC RHINITIS: ICD-10-CM

## 2018-05-28 DIAGNOSIS — J30.89 CHRONIC ALLERGIC RHINITIS DUE TO FUNGAL SPORES: ICD-10-CM

## 2018-05-28 PROCEDURE — 95115 IMMUNOTHERAPY ONE INJECTION: CPT | Mod: S$GLB,,, | Performed by: FAMILY MEDICINE

## 2018-05-28 PROCEDURE — 99499 UNLISTED E&M SERVICE: CPT | Mod: S$GLB,,, | Performed by: ALLERGY & IMMUNOLOGY

## 2018-05-28 NOTE — PROGRESS NOTES
Patient here for allergy injection.  No new meds, no problems with last injection.    0.4mL Red 1:1 given SQ in upper right arm.  Tolerated well.    Site assessed after 30 minutes.  1+ reaction with induration noted.  No complaints voiced.

## 2018-06-04 ENCOUNTER — CLINICAL SUPPORT (OUTPATIENT)
Dept: INTERNAL MEDICINE | Facility: CLINIC | Age: 83
End: 2018-06-04
Payer: MEDICARE

## 2018-06-04 DIAGNOSIS — J30.2 CHRONIC SEASONAL ALLERGIC RHINITIS: ICD-10-CM

## 2018-06-04 PROCEDURE — 95115 IMMUNOTHERAPY ONE INJECTION: CPT | Mod: S$GLB,,, | Performed by: FAMILY MEDICINE

## 2018-06-04 PROCEDURE — 99499 UNLISTED E&M SERVICE: CPT | Mod: S$GLB,,, | Performed by: ALLERGY & IMMUNOLOGY

## 2018-06-04 NOTE — PROGRESS NOTES
Patient here for allergy injection.  No new meds, no problems with last injection.    0.45mL Red 1:1 given SQ in upper right arm.  Tolerated well.    Site assessed after 30 minutes.  0 reaction with induration noted.  No complaints voiced.

## 2018-06-07 ENCOUNTER — ANTI-COAG VISIT (OUTPATIENT)
Dept: CARDIOLOGY | Facility: CLINIC | Age: 83
End: 2018-06-07
Payer: MEDICARE

## 2018-06-07 DIAGNOSIS — I48.20 CHRONIC ATRIAL FIBRILLATION: ICD-10-CM

## 2018-06-07 DIAGNOSIS — Z79.01 CURRENT USE OF LONG TERM ANTICOAGULATION: Primary | ICD-10-CM

## 2018-06-07 LAB — INR PPP: 2.4 (ref 2–3)

## 2018-06-07 PROCEDURE — 85610 PROTHROMBIN TIME: CPT | Mod: QW,S$GLB,, | Performed by: INTERNAL MEDICINE

## 2018-06-07 NOTE — PROGRESS NOTES
Quick follow up for elevated INR on 5/24. INR within therapeutic range today. Patient reports still having occasional diarrhea. She also reports occasional bruising from use. She denies any other changes. We would like to follow up in 2 weeks but the patient states that she can not come in 2 weeks. Patient reminded of the risks associated with infrequent monitoring. Patient states that she understands. Will maintain current dose and follow up in 3 weeks. Patient reminded to call coumadin clinic with any changes or concerns.

## 2018-06-15 ENCOUNTER — CLINICAL SUPPORT (OUTPATIENT)
Dept: INTERNAL MEDICINE | Facility: CLINIC | Age: 83
End: 2018-06-15
Payer: MEDICARE

## 2018-06-15 DIAGNOSIS — J31.0 CHRONIC RHINITIS: ICD-10-CM

## 2018-06-15 PROCEDURE — 99499 UNLISTED E&M SERVICE: CPT | Mod: S$GLB,,, | Performed by: ALLERGY & IMMUNOLOGY

## 2018-06-15 PROCEDURE — 95115 IMMUNOTHERAPY ONE INJECTION: CPT | Mod: S$GLB,,, | Performed by: FAMILY MEDICINE

## 2018-06-15 NOTE — PROGRESS NOTES
Patient here for allergy injection.  No new meds, no problems with last injection.    0.5mL Red 1:1 given SQ in upper right arm.  Tolerated well.    Site assessed after 30 minutes.  +1 slight erythema and induration noted.  No complaints voiced.

## 2018-06-28 ENCOUNTER — ANTI-COAG VISIT (OUTPATIENT)
Dept: CARDIOLOGY | Facility: CLINIC | Age: 83
End: 2018-06-28
Payer: MEDICARE

## 2018-06-28 DIAGNOSIS — Z79.01 CURRENT USE OF LONG TERM ANTICOAGULATION: Primary | ICD-10-CM

## 2018-06-28 DIAGNOSIS — I48.20 CHRONIC ATRIAL FIBRILLATION: ICD-10-CM

## 2018-06-28 LAB — INR PPP: 1.4 (ref 2–3)

## 2018-06-28 PROCEDURE — 85610 PROTHROMBIN TIME: CPT | Mod: QW,S$GLB,, | Performed by: INTERNAL MEDICINE

## 2018-06-28 PROCEDURE — 99211 OFF/OP EST MAY X REQ PHY/QHP: CPT | Mod: 25,S$GLB,ICN, | Performed by: INTERNAL MEDICINE

## 2018-06-28 NOTE — PROGRESS NOTES
INR low today. Patient states that she had an increase in greens this past week and she will resume her normal intake. She has bruises from use, denies any bleeding, missed doses or other changes. Will boost dose today and then increase back to previously stable weekly dose until follow-up 7/9(wanted 7/6 but patient couldn't make it) SARAH flores assisted with dosing. Advised her to call with any changes or concerns.

## 2018-07-09 ENCOUNTER — ANTI-COAG VISIT (OUTPATIENT)
Dept: CARDIOLOGY | Facility: CLINIC | Age: 83
End: 2018-07-09
Payer: MEDICARE

## 2018-07-09 DIAGNOSIS — I48.20 CHRONIC ATRIAL FIBRILLATION: ICD-10-CM

## 2018-07-09 DIAGNOSIS — Z79.01 CURRENT USE OF LONG TERM ANTICOAGULATION: Primary | ICD-10-CM

## 2018-07-09 LAB — INR PPP: 2.3 (ref 2–3)

## 2018-07-09 PROCEDURE — 85610 PROTHROMBIN TIME: CPT | Mod: QW,S$GLB,, | Performed by: INTERNAL MEDICINE

## 2018-07-09 PROCEDURE — 99211 OFF/OP EST MAY X REQ PHY/QHP: CPT | Mod: 25,S$GLB,, | Performed by: PHARMACIST

## 2018-07-09 NOTE — PROGRESS NOTES
Quick follow up for subtherapeutic INR on 6/28. INR within therapeutic range today. Bruising noted to both arms from use. Patient denies any bleeding or changes that may affect warfarin therapy. We will maintain current dose and follow up in 3 weeks. Patient reminded to call coumadin clinic with any changes or concerns.

## 2018-07-19 NOTE — PROGRESS NOTES
Subjective:       Patient ID: [place] Liam Jimenez is a 86 y.o. female.    Chief Complaint: Follow-up (right side pain, question about cla)    Patient is a 86 y.o.female who presents today for follow up.    Cholesterol: (due now)  Vaccines: Influenza (yearly); Tetanus (up to date) ; Pneumovax (2010; Prevnar 2015); Zoster (declines)  Eye exam: sept 2015  Mammogram: June 2017; due now  Gyn exam: declines  Colonoscopy: June 2009; repeat in June 2019  DEXA: june 2016; due now  Exercise: yes; walking  Diet: healthy    Skin mass: left axilla; not tender    Review of Systems   Constitutional: Negative for appetite change, chills, diaphoresis, fatigue and fever.   HENT: Negative for congestion, dental problem, ear discharge, ear pain, hearing loss, postnasal drip, sinus pressure and sore throat.    Eyes: Negative for discharge, redness and itching.   Respiratory: Negative for cough, chest tightness, shortness of breath and wheezing.    Cardiovascular: Negative for chest pain, palpitations and leg swelling.   Gastrointestinal: Negative for abdominal pain, constipation, diarrhea, nausea and vomiting.   Endocrine: Negative for cold intolerance and heat intolerance.   Genitourinary: Negative for difficulty urinating, frequency, hematuria and urgency.   Musculoskeletal: Negative for arthralgias, back pain, gait problem, myalgias and neck pain.   Skin: Negative for color change and rash.   Neurological: Negative for dizziness, syncope and headaches.   Hematological: Negative for adenopathy.   Psychiatric/Behavioral: Negative for behavioral problems and sleep disturbance. The patient is not nervous/anxious.        Objective:      Physical Exam   Constitutional: She is oriented to person, place, and time. She appears well-developed and well-nourished. No distress.   HENT:   Head: Normocephalic and atraumatic.   Right Ear: External ear normal.   Left Ear: External ear normal.   Nose: Nose normal.   Mouth/Throat: Oropharynx is clear  and moist. No oropharyngeal exudate.   Eyes: Conjunctivae and EOM are normal. Pupils are equal, round, and reactive to light. Right eye exhibits no discharge. Left eye exhibits no discharge. No scleral icterus.   Neck: Normal range of motion. Neck supple. No JVD present. No thyromegaly present.   Cardiovascular: Normal rate, regular rhythm, normal heart sounds and intact distal pulses.  Exam reveals no gallop and no friction rub.    No murmur heard.  Pulmonary/Chest: Effort normal and breath sounds normal. No stridor. No respiratory distress. She has no wheezes. She has no rales. She exhibits no tenderness.   Abdominal: Soft. Bowel sounds are normal. She exhibits no distension. There is no tenderness. There is no rebound.   Musculoskeletal: Normal range of motion. She exhibits no edema or tenderness.   Lymphadenopathy:     She has no cervical adenopathy.   Neurological: She is alert and oriented to person, place, and time. No cranial nerve deficit.   Skin: Skin is warm and dry. No rash noted. She is not diaphoretic. No erythema.        Psychiatric: She has a normal mood and affect. Her behavior is normal.   Nursing note and vitals reviewed.      Assessment and Plan:       1. Atherosclerosis of aorta    - Comprehensive metabolic panel; Future  - CBC auto differential; Future  - Hemoglobin A1c; Future  - Lipid panel; Future  - TSH; Future  - Urinalysis; Future  - Vitamin D; Future    2. Chronic atrial fibrillation    - Comprehensive metabolic panel; Future  - CBC auto differential; Future  - Hemoglobin A1c; Future  - Lipid panel; Future  - TSH; Future  - Urinalysis; Future  - Vitamin D; Future    3. Chronic diastolic heart failure  - Comprehensive metabolic panel; Future  - CBC auto differential; Future  - Hemoglobin A1c; Future  - Lipid panel; Future  - TSH; Future  - Urinalysis; Future  - Vitamin D; Future    4. Pure hypercholesterolemia  - Comprehensive metabolic panel; Future  - CBC auto differential; Future  -  Hemoglobin A1c; Future  - Lipid panel; Future  - TSH; Future  - Urinalysis; Future  - Vitamin D; Future    5. Chronic kidney disease, stage III (moderate)  - avoid nsaids  - Comprehensive metabolic panel; Future  - CBC auto differential; Future  - Hemoglobin A1c; Future  - Lipid panel; Future  - TSH; Future  - Urinalysis; Future  - Vitamin D; Future    6. Prediabetes  - diet controlle  - Comprehensive metabolic panel; Future  - CBC auto differential; Future  - Hemoglobin A1c; Future  - Lipid panel; Future  - TSH; Future  - Urinalysis; Future  - Vitamin D; Future    7. Visit for screening mammogram  - Mammo Digital Screening Bilat with CAD; Future    8. Postmenopausal    - DXA Bone Density Spine And Hip; Future    9. Skin mass  - US Soft Tissue Misc; Future          No Follow-up on file.

## 2018-07-20 ENCOUNTER — OFFICE VISIT (OUTPATIENT)
Dept: INTERNAL MEDICINE | Facility: CLINIC | Age: 83
End: 2018-07-20
Payer: MEDICARE

## 2018-07-20 ENCOUNTER — HOSPITAL ENCOUNTER (OUTPATIENT)
Dept: RADIOLOGY | Facility: HOSPITAL | Age: 83
Discharge: HOME | End: 2018-07-20
Attending: INTERNAL MEDICINE
Payer: MEDICARE

## 2018-07-20 ENCOUNTER — CLINICAL SUPPORT (OUTPATIENT)
Dept: INTERNAL MEDICINE | Facility: CLINIC | Age: 83
End: 2018-07-20
Payer: MEDICARE

## 2018-07-20 ENCOUNTER — PATIENT MESSAGE (OUTPATIENT)
Dept: INTERNAL MEDICINE | Facility: CLINIC | Age: 83
End: 2018-07-20

## 2018-07-20 VITALS
BODY MASS INDEX: 29 KG/M2 | DIASTOLIC BLOOD PRESSURE: 78 MMHG | HEART RATE: 87 BPM | RESPIRATION RATE: 18 BRPM | WEIGHT: 179.69 LBS | TEMPERATURE: 98 F | SYSTOLIC BLOOD PRESSURE: 136 MMHG

## 2018-07-20 DIAGNOSIS — J31.0 CHRONIC RHINITIS: ICD-10-CM

## 2018-07-20 DIAGNOSIS — R22.9 SKIN MASS: ICD-10-CM

## 2018-07-20 DIAGNOSIS — R73.03 PREDIABETES: ICD-10-CM

## 2018-07-20 DIAGNOSIS — I70.0 ATHEROSCLEROSIS OF AORTA: Primary | ICD-10-CM

## 2018-07-20 DIAGNOSIS — N18.30 CHRONIC KIDNEY DISEASE, STAGE III (MODERATE): ICD-10-CM

## 2018-07-20 DIAGNOSIS — Z12.31 VISIT FOR SCREENING MAMMOGRAM: ICD-10-CM

## 2018-07-20 DIAGNOSIS — I50.32 CHRONIC DIASTOLIC HEART FAILURE: ICD-10-CM

## 2018-07-20 DIAGNOSIS — E78.00 PURE HYPERCHOLESTEROLEMIA: ICD-10-CM

## 2018-07-20 DIAGNOSIS — I48.20 CHRONIC ATRIAL FIBRILLATION: ICD-10-CM

## 2018-07-20 DIAGNOSIS — R74.8 ELEVATED ALKALINE PHOSPHATASE LEVEL: Primary | ICD-10-CM

## 2018-07-20 DIAGNOSIS — Z78.0 POSTMENOPAUSAL: ICD-10-CM

## 2018-07-20 PROCEDURE — 95115 IMMUNOTHERAPY ONE INJECTION: CPT | Mod: S$GLB,,, | Performed by: FAMILY MEDICINE

## 2018-07-20 PROCEDURE — 99999 PR PBB SHADOW E&M-EST. PATIENT-LVL III: CPT | Mod: PBBFAC,,, | Performed by: INTERNAL MEDICINE

## 2018-07-20 PROCEDURE — 77067 SCR MAMMO BI INCL CAD: CPT | Mod: TC,PO

## 2018-07-20 PROCEDURE — 99499 UNLISTED E&M SERVICE: CPT | Mod: S$GLB,,, | Performed by: ALLERGY & IMMUNOLOGY

## 2018-07-20 PROCEDURE — 77067 SCR MAMMO BI INCL CAD: CPT | Mod: 26,,, | Performed by: RADIOLOGY

## 2018-07-20 PROCEDURE — 99214 OFFICE O/P EST MOD 30 MIN: CPT | Mod: S$GLB,,, | Performed by: INTERNAL MEDICINE

## 2018-07-20 PROCEDURE — 77063 BREAST TOMOSYNTHESIS BI: CPT | Mod: 26,,, | Performed by: RADIOLOGY

## 2018-07-24 ENCOUNTER — PATIENT MESSAGE (OUTPATIENT)
Dept: INTERNAL MEDICINE | Facility: CLINIC | Age: 83
End: 2018-07-24

## 2018-07-25 ENCOUNTER — PATIENT MESSAGE (OUTPATIENT)
Dept: INTERNAL MEDICINE | Facility: CLINIC | Age: 83
End: 2018-07-25

## 2018-07-25 NOTE — TELEPHONE ENCOUNTER
----- Message from Micaela Chiang sent at 7/25/2018 11:38 AM CDT -----  Contact: self/[phone]  Pt called in regards to disregarding her my [facility] message that she sent about an aug 3 lab appointment.      Please advise

## 2018-07-26 ENCOUNTER — HOSPITAL ENCOUNTER (OUTPATIENT)
Dept: RADIOLOGY | Facility: HOSPITAL | Age: 83
Discharge: HOME | End: 2018-07-26
Attending: INTERNAL MEDICINE
Payer: MEDICARE

## 2018-07-26 ENCOUNTER — TELEPHONE (OUTPATIENT)
Dept: INTERNAL MEDICINE | Facility: CLINIC | Age: 83
End: 2018-07-26

## 2018-07-26 ENCOUNTER — PATIENT MESSAGE (OUTPATIENT)
Dept: INTERNAL MEDICINE | Facility: CLINIC | Age: 83
End: 2018-07-26

## 2018-07-26 DIAGNOSIS — R22.9 SKIN MASS: ICD-10-CM

## 2018-07-26 DIAGNOSIS — R22.32 LOCALIZED SWELLING, MASS AND LUMP, LEFT UPPER LIMB: ICD-10-CM

## 2018-07-26 DIAGNOSIS — R22.9 SKIN MASS: Primary | ICD-10-CM

## 2018-07-26 PROCEDURE — 76881 US COMPL JOINT R-T W/IMG: CPT | Mod: 26,,, | Performed by: RADIOLOGY

## 2018-07-26 PROCEDURE — 76881 US COMPL JOINT R-T W/IMG: CPT | Mod: TC

## 2018-07-26 NOTE — TELEPHONE ENCOUNTER
----- Message from Lou Palmer sent at 7/26/2018  3:01 PM CDT -----  Contact: call pt at home not cell   [phone]  Patient is calling stating that she just left a message with a different , and she gave the wrong phone number   Please call her at her home number of [phone]

## 2018-07-26 NOTE — TELEPHONE ENCOUNTER
----- Message from Radha Pedraza sent at 7/26/2018  2:57 PM CDT -----  Contact: Self/[phone]  Patient states she has questions about the biopsy. Please advise.

## 2018-07-26 NOTE — TELEPHONE ENCOUNTER
Spoke to pt. Pt stated that she would like to proceed with seeing general surgery. She stated that she has a trip coming up in October. She would like to be seen before then.  Please enter referral.

## 2018-07-27 ENCOUNTER — TELEPHONE (OUTPATIENT)
Dept: INTERNAL MEDICINE | Facility: CLINIC | Age: 83
End: 2018-07-27

## 2018-07-27 NOTE — TELEPHONE ENCOUNTER
----- Message from Paige CARRINGTON Shekhar sent at 7/27/2018  1:17 PM CDT -----  Contact: [phone]  Hi, I scheduled this pt with Dr Ricketts. Pt would like to know if she should see Dr Lei due to the mass being close to her Breast. He does only see Breast surgery issues. Please advise. Pt is scheduled 8/8 with Dr Ricketts and Dr Lei does not have any openings until September.    Thanks!

## 2018-07-27 NOTE — TELEPHONE ENCOUNTER
----- Message from Radha Pedraza sent at 7/26/2018  3:38 PM CDT -----  Contact: Self/[phone]  Patient would like to a specific physician that Dr. Young would like the patient to see. Please advise.

## 2018-07-28 NOTE — TELEPHONE ENCOUNTER
----- Message from Kimani Mary sent at 7/27/2018  4:37 PM CDT -----  Contact: Self [phone]  Patient is returning a phone call.  Who left a message for the patient: Clara  Does patient know what this is regarding:  Unknown  Comments:

## 2018-07-31 ENCOUNTER — TELEPHONE (OUTPATIENT)
Dept: INTERNAL MEDICINE | Facility: CLINIC | Age: 83
End: 2018-07-31

## 2018-07-31 ENCOUNTER — LAB VISIT (OUTPATIENT)
Dept: LAB | Facility: HOSPITAL | Age: 83
End: 2018-07-31
Attending: INTERNAL MEDICINE
Payer: MEDICARE

## 2018-07-31 ENCOUNTER — ANTI-COAG VISIT (OUTPATIENT)
Dept: CARDIOLOGY | Facility: CLINIC | Age: 83
End: 2018-07-31
Payer: MEDICARE

## 2018-07-31 DIAGNOSIS — R74.8 ELEVATED ALKALINE PHOSPHATASE LEVEL: ICD-10-CM

## 2018-07-31 DIAGNOSIS — I48.20 CHRONIC ATRIAL FIBRILLATION: ICD-10-CM

## 2018-07-31 DIAGNOSIS — Z79.01 CURRENT USE OF LONG TERM ANTICOAGULATION: Primary | ICD-10-CM

## 2018-07-31 LAB
ALBUMIN SERPL BCP-MCNC: 3.7 G/DL
ALP SERPL-CCNC: 133 U/L
ALT SERPL W/O P-5'-P-CCNC: 13 U/L
ANION GAP SERPL CALC-SCNC: 8 MMOL/L
AST SERPL-CCNC: 16 U/L
BILIRUB SERPL-MCNC: 0.6 MG/DL
BUN SERPL-MCNC: 17 MG/DL
CALCIUM SERPL-MCNC: 9.4 MG/DL
CHLORIDE SERPL-SCNC: 104 MMOL/L
CO2 SERPL-SCNC: 29 MMOL/L
CREAT SERPL-MCNC: 0.9 MG/DL
EST. GFR  (AFRICAN AMERICAN): >60 ML/MIN/1.73 M^2
EST. GFR  (NON AFRICAN AMERICAN): 58.1 ML/MIN/1.73 M^2
GLUCOSE SERPL-MCNC: 109 MG/DL
INR PPP: 3 (ref 2–3)
POTASSIUM SERPL-SCNC: 4.5 MMOL/L
PROT SERPL-MCNC: 7.1 G/DL
SODIUM SERPL-SCNC: 141 MMOL/L

## 2018-07-31 PROCEDURE — 84080 ASSAY ALKALINE PHOSPHATASES: CPT

## 2018-07-31 PROCEDURE — 84075 ASSAY ALKALINE PHOSPHATASE: CPT

## 2018-07-31 PROCEDURE — 80053 COMPREHEN METABOLIC PANEL: CPT

## 2018-07-31 PROCEDURE — 36415 COLL VENOUS BLD VENIPUNCTURE: CPT

## 2018-07-31 NOTE — TELEPHONE ENCOUNTER
----- Message from Vania Cesar sent at 7/31/2018 10:55 AM CDT -----  Contact: self [phone]  Patient is returning a phone call.  Who left a message for the patient: Clara   Does patient know what this is regarding:  no  Comments:

## 2018-07-31 NOTE — PROGRESS NOTES
Patient INR in therapeutic range today. Reports no bleeding, bruising or other changes. Will maintain current weekly dose until follow up in 3 weeks. Advised patient to call with any concerns or changes.

## 2018-08-03 LAB
ALP BONE CFR SERPL: 29 % (ref 28–66)
ALP INTEST CFR SERPL: 0 % (ref 1–24)
ALP LIVER CFR SERPL: 71 % (ref 25–69)
ALP PLAC CFR SERPL: 0 %
ALP SERPL-CCNC: 114 U/L (ref 33–130)

## 2018-08-04 RX ORDER — WARFARIN 3 MG/1
TABLET ORAL
Qty: 120 TABLET | Refills: 0 | Status: ON HOLD | OUTPATIENT
Start: 2018-08-04 | End: 2018-08-16 | Stop reason: CLARIF

## 2018-08-05 ENCOUNTER — PATIENT MESSAGE (OUTPATIENT)
Dept: INTERNAL MEDICINE | Facility: CLINIC | Age: 83
End: 2018-08-05

## 2018-08-05 DIAGNOSIS — M54.5 LOW BACK PAIN, UNSPECIFIED BACK PAIN LATERALITY, UNSPECIFIED CHRONICITY, WITH SCIATICA PRESENCE UNSPECIFIED: ICD-10-CM

## 2018-08-05 DIAGNOSIS — M54.9 BACK PAIN, UNSPECIFIED BACK LOCATION, UNSPECIFIED BACK PAIN LATERALITY, UNSPECIFIED CHRONICITY: ICD-10-CM

## 2018-08-05 DIAGNOSIS — R07.81 RIB PAIN ON RIGHT SIDE: ICD-10-CM
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
## 2018-08-05 RX ORDER — TIZANIDINE 2 MG/1
TABLET ORAL
Qty: 60 TABLET | Refills: 0 | Status: SHIPPED | OUTPATIENT
Start: 2018-08-05 | End: 2018-08-08

## 2018-08-05 NOTE — PROGRESS NOTES
Patient seen by Suzy SERRATO. I have reviewed her initial findings and agree with her assessment. Please repeat INR in 2 weeks. INR on upper end and dose recently increased.

## 2018-08-08 ENCOUNTER — OFFICE VISIT (OUTPATIENT)
Dept: SURGERY | Facility: CLINIC | Age: 83
End: 2018-08-08
Payer: MEDICARE

## 2018-08-08 VITALS
BODY MASS INDEX: 29.34 KG/M2 | TEMPERATURE: 98 F | WEIGHT: 182.56 LBS | DIASTOLIC BLOOD PRESSURE: 80 MMHG | HEIGHT: 66 IN | SYSTOLIC BLOOD PRESSURE: 165 MMHG | HEART RATE: 91 BPM

## 2018-08-08 DIAGNOSIS — R22.32 AXILLARY MASS, LEFT: Primary | ICD-10-CM

## 2018-08-08 PROCEDURE — 99999 PR PBB SHADOW E&M-EST. PATIENT-LVL V: CPT | Mod: PBBFAC,,, | Performed by: SURGERY

## 2018-08-08 PROCEDURE — 99203 OFFICE O/P NEW LOW 30 MIN: CPT | Mod: S$GLB,,, | Performed by: SURGERY

## 2018-08-08 NOTE — H&P (VIEW-ONLY)
GENERAL SURGERY CLINIC  HISTORY AND PHYSICAL    CC:  L axillary mass    HPI:  Jenniffer Jimenez is a 86 y.o.  female with Hx of Afib who presents to clinic for a left axillary mass. She states that she first noticed the mass around 2018 and that she underwent an ultrasound for further workup, which she says was inconclusive. She notes that the mass does not cause her any discomfort and does not believe that it has increased in size. Of note, the patient takes Coumadin daily for her atrial fibrillation. She states that she does not wish to undergo general anesthesia if the mass is to be removed.        Review of Systems   All 12 systems otherwise negative.  See HPI for pertinent positives     Past Medical History:   Diagnosis Date    Amblyopia of left eye 4/10/2013    Anxiety     Arthritis     Auricular fibrillation     Central retinal vein occlusion of left eye     CHF (congestive heart failure)     Coronary artery disease     Depression     Diastolic heart failure 2014    Exotropia of both eyes 2013    recession RSR 5.0mm w/ adj; recession LR os 5.0 w/ adj; resect MR os  4.0mm    History of resection of small bowel     Hyperlipidemia     Hypertension     Hypertensive retinopathy of both eyes     Hypoglycemia     Macular degeneration     Meniere's disease of both ears     Other and unspecified hyperlipidemia     Posterior vitreous detachment of both eyes        Past Surgical History:   Procedure Laterality Date    APPENDECTOMY      CARDIAC CATHETERIZATION      CATARACT EXTRACTION W/  INTRAOCULAR LENS IMPLANT Bilateral      SECTION, CLASSIC      HYSTERECTOMY      INNER EAR SURGERY      OOPHORECTOMY      SINUS SURGERY      STRABISMUS SURGERY  13    RSR recession 5 mm, LLR recession 5 mm and LMR resection 4mm    STRABISMUS SURGERY  2014    recess LR OD 6mm    TONSILLECTOMY         Social History     Social History    Marital status:       Spouse name: N/A    Number of children: N/A    Years of education: N/A     Occupational History    Not on file.     Social History Main Topics    Smoking status: Former Smoker     Types: Cigarettes     Quit date: 10/29/1982    Smokeless tobacco: Former User    Alcohol use 0.0 oz/week      Comment: socially    Drug use: No    Sexual activity: No     Other Topics Concern    Not on file     Social History Narrative    No narrative on file       Review of patient's allergies indicates:   Allergen Reactions    Bactrim [sulfamethoxazole-trimethoprim]      arthritis    Dramamine [dimenhydrinate]     Tramadol Other (See Comments)     hallucinations    Beta-blockers (beta-adrenergic blocking agts)      Can not go on beta blockers for long period of time    Phenergan [promethazine]      per pt. request- saw sisters have bad reaction to drug    Tizanidine Other (See Comments)         PHYSICAL EXAM:  Vitals:    08/08/18 1414   BP: (!) 165/80   Pulse: 91   Temp: 97.7 °F (36.5 °C)       General: NAD  Neuro: AAOx3  Cardio: RRR  Resp: Breathing even and unlabored  Abd: Soft, ND, NT, no palpable mass  Ext: Warm and well perfused, ~4cm LEFT axillary mass that is freely mobile and non-tender to palpation       PERTINENT LABS:  Reviewed. None.      PERTINENT IMAGING:  Reviewed. None.      ASSESSMENT/PLAN:  Jenniffer Jimenez is a 86 y.o. female with a left axillary mass    - Schedule for mass excision in the OR under MAC per patient not wanting to be placed under general anesthesia    Gilles Boyd M.D.  General Surgery PGY3  [phone]

## 2018-08-08 NOTE — LETTER
August 14, 2018      Rubi Young, DO  [address] [facility] [place] [address]           [facility]  [address]  [place] [address]  Phone: [phone]          Patient: Jenniffer Jimenez   MR Number: [id number]   YOB: 1932   Date of Visit: 8/8/2018       Dear Dr. Rubi Young:    Thank you for referring Jenniffer Jimenez to me for evaluation. Attached you will find relevant portions of my assessment and plan of care.    If you have questions, please do not hesitate to call me. I look forward to following Jenniffer Jimenez along with you.    Sincerely,    Hao Ricketts MD    Enclosure  CC:  No Recipients    If you would like to receive this communication electronically, please contact [email or web address] or [phone] to request more information on [employer] access.    For providers and/or their staff who would like to refer a patient to [facility], please contact us through our one-stop-shop provider referral line, [facility] , at [phone].    If you feel you have received this communication in error or would no longer like to receive these types of communications, please e-mail [email or web address]

## 2018-08-08 NOTE — PATIENT INSTRUCTIONS
Understanding a Lipoma    A lipoma is a benign lump under the skin thats made of fat. Its not cancer. It feels soft like rubber when you press it, and in most cases it doesnt hurt. Some people have more than one. A lipoma grows slowly over time and doesnt cause many problems. Lipomas occur most often in adults from ages 40 to 60, and more often in men.  How to say it  Ly-POH-[id number]   What causes a lipoma?  The cause is not yet known. Experts are still learning more. It may be partly caused by a problem in a gene. They can run in families. Familial multiple lipomatosis is when 2 or more family members have many lipomas.  Symptoms of a lipoma  The main symptom of a lipoma is a soft lump under the skin that doesnt hurt unless it is pressing on a nerve. It may be small, around 1/4 inch across. Or it may be larger, up to 4 inches across or more.  There are different kinds of lipomas. The most common kind occurs under the skin of the shoulders, chest, back, belly, or under the arms. In some cases, a lipoma can occur on the legs. In rare cases, one may occur deeper in the body or in a muscle.  Treatment for a lipoma  In most cases, a lipoma doesnt need treatment. Your healthcare provider may look at it during regular checkups to see if it changes.  But if the lipoma is painful or you want it removed for cosmetic reasons, it can be removed with surgery. The surgery is called excision. The lipoma will most likely not grow back after surgery. During surgery, the area around the lipoma is numbed. If you have a deep lipoma, you may need medicine called regional anesthesia to numb a larger area. Or you may need medicine called general anesthesia to put you to sleep during the procedure. Then the doctor makes a cut over the area of the lipoma. He or she removes the lump of fat. The cut is then closed with stitches.  Possible complications of a lipoma  A large lipoma inside the body can press on organs, nerves, or other  tissues and cause problems. For example, it can cause problems with breathing or digestion.  Living with a lipoma  Your healthcare provider may look at the lipoma during regular checkups to see if it changes or is causing problems.  When to call your healthcare provider  Call your healthcare provider right away if you have any of these:  · Lipoma that grows quickly, causes pain, or feels hard  · Growth of new lipomas   Date Last Reviewed: 5/1/2016  © 1315-4312 [employer] [place]. [address] [place] [address]. All rights reserved. This information is not intended as a substitute for professional medical care. Always follow your healthcare professional's instructions.

## 2018-08-08 NOTE — PROGRESS NOTES
GENERAL SURGERY CLINIC  HISTORY AND PHYSICAL    CC:  L axillary mass    HPI:  Jenniffer Jimenez is a 86 y.o.  female with Hx of Afib who presents to clinic for a left axillary mass. She states that she first noticed the mass around 2018 and that she underwent an ultrasound for further workup, which she says was inconclusive. She notes that the mass does not cause her any discomfort and does not believe that it has increased in size. Of note, the patient takes Coumadin daily for her atrial fibrillation. She states that she does not wish to undergo general anesthesia if the mass is to be removed.        Review of Systems   All 12 systems otherwise negative.  See HPI for pertinent positives     Past Medical History:   Diagnosis Date    Amblyopia of left eye 4/10/2013    Anxiety     Arthritis     Auricular fibrillation     Central retinal vein occlusion of left eye     CHF (congestive heart failure)     Coronary artery disease     Depression     Diastolic heart failure 2014    Exotropia of both eyes 2013    recession RSR 5.0mm w/ adj; recession LR os 5.0 w/ adj; resect MR os  4.0mm    History of resection of small bowel     Hyperlipidemia     Hypertension     Hypertensive retinopathy of both eyes     Hypoglycemia     Macular degeneration     Meniere's disease of both ears     Other and unspecified hyperlipidemia     Posterior vitreous detachment of both eyes        Past Surgical History:   Procedure Laterality Date    APPENDECTOMY      CARDIAC CATHETERIZATION      CATARACT EXTRACTION W/  INTRAOCULAR LENS IMPLANT Bilateral      SECTION, CLASSIC      HYSTERECTOMY      INNER EAR SURGERY      OOPHORECTOMY      SINUS SURGERY      STRABISMUS SURGERY  13    RSR recession 5 mm, LLR recession 5 mm and LMR resection 4mm    STRABISMUS SURGERY  2014    recess LR OD 6mm    TONSILLECTOMY         Social History     Social History    Marital status:       Spouse name: N/A    Number of children: N/A    Years of education: N/A     Occupational History    Not on file.     Social History Main Topics    Smoking status: Former Smoker     Types: Cigarettes     Quit date: 10/29/1982    Smokeless tobacco: Former User    Alcohol use 0.0 oz/week      Comment: socially    Drug use: No    Sexual activity: No     Other Topics Concern    Not on file     Social History Narrative    No narrative on file       Review of patient's allergies indicates:   Allergen Reactions    Bactrim [sulfamethoxazole-trimethoprim]      arthritis    Dramamine [dimenhydrinate]     Tramadol Other (See Comments)     hallucinations    Beta-blockers (beta-adrenergic blocking agts)      Can not go on beta blockers for long period of time    Phenergan [promethazine]      per pt. request- saw sisters have bad reaction to drug    Tizanidine Other (See Comments)         PHYSICAL EXAM:  Vitals:    08/08/18 1414   BP: (!) 165/80   Pulse: 91   Temp: 97.7 °F (36.5 °C)       General: NAD  Neuro: AAOx3  Cardio: RRR  Resp: Breathing even and unlabored  Abd: Soft, ND, NT, no palpable mass  Ext: Warm and well perfused, ~4cm LEFT axillary mass that is freely mobile and non-tender to palpation       PERTINENT LABS:  Reviewed. None.      PERTINENT IMAGING:  Reviewed. None.      ASSESSMENT/PLAN:  Jenniffer Jimenez is a 86 y.o. female with a left axillary mass    - Schedule for mass excision in the OR under MAC per patient not wanting to be placed under general anesthesia    Gilles Boyd M.D.  General Surgery PGY3  [phone]

## 2018-08-09 DIAGNOSIS — M54.9 BACK PAIN, UNSPECIFIED BACK LOCATION, UNSPECIFIED BACK PAIN LATERALITY, UNSPECIFIED CHRONICITY: ICD-10-CM

## 2018-08-09 DIAGNOSIS — R07.81 RIB PAIN ON RIGHT SIDE: ICD-10-CM

## 2018-08-09 DIAGNOSIS — R22.2 MASS ON BACK: ICD-10-CM

## 2018-08-09 DIAGNOSIS — M54.5 LOW BACK PAIN, UNSPECIFIED BACK PAIN LATERALITY, UNSPECIFIED CHRONICITY, WITH SCIATICA PRESENCE UNSPECIFIED: ICD-10-CM
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
## 2018-08-09 RX ORDER — TIZANIDINE 2 MG/1
TABLET ORAL
Qty: 60 TABLET | Refills: 0 | OUTPATIENT
Start: 2018-08-09

## 2018-08-09 RX ORDER — SODIUM CHLORIDE 9 MG/ML
INJECTION, SOLUTION INTRAVENOUS CONTINUOUS
Status: CANCELLED | OUTPATIENT
Start: 2018-08-09

## 2018-08-09 NOTE — PROGRESS NOTES
Received message to clear patient for axillary mass excision on 8/16. Reviewed history, she can hold 5 days. Message sent to Dr. Saenz. Risk factors - age, female, HF, HTN, TIA (1997).

## 2018-08-10 ENCOUNTER — TELEPHONE (OUTPATIENT)
Dept: ALLERGY | Facility: CLINIC | Age: 83
End: 2018-08-10

## 2018-08-10 NOTE — TELEPHONE ENCOUNTER
----- Message from Columba Baldwin LPN sent at 8/10/2018  8:45 AM CDT -----  Contact: patient [phone]  I'm sending this to you because I don't have access to xis.     ----- Message -----  From: Micaela Denis  Sent: 8/10/2018   8:38 AM  To: [facility]LSEN Staff    Patient has a question about her next injection and would like to speak with the nurse. Please call her.

## 2018-08-13 ENCOUNTER — TELEPHONE (OUTPATIENT)
Dept: SURGERY | Facility: CLINIC | Age: 83
End: 2018-08-13

## 2018-08-13 NOTE — TELEPHONE ENCOUNTER
----- Message from Camron Dodge sent at 8/13/2018 12:49 PM CDT -----  Needs Advice    Reason for call: Pt said she is schedule for surgery on 8/16/18. Pt said she looked over some paper work and has some questions regarding herbal intake. Pt said she has been drinking green tea.      Communication Preference: [phone]  Additional Information:

## 2018-08-13 NOTE — TELEPHONE ENCOUNTER
Pt called with concern that she has continued to drink green tea and take Calcium supplements.  She read in her Pre-Op packet to stop taking supplements.  Reassured her that drinking green tea is OK but not to take the pill form or green tea.  She may also take Calcium supplements without stopping.  She verbalized understanding.

## 2018-08-13 NOTE — TELEPHONE ENCOUNTER
Pt is requesting that pain medication be prescribed for her prior to her surgery on 8/16/18 as she lives alone.  Notified her that narcotics are not prescribed prior to the procedure, but that she can make arrangements for bedside delivery by the [facility] day of surgery.  She verbalized understanding.

## 2018-08-13 NOTE — TELEPHONE ENCOUNTER
----- Message from Daniella Berry sent at 8/13/2018  8:20 AM CDT -----  Needs Advice    Reason for call:   Pt. wants to know before her surgery can the  Prescribe her the pain med's, because she live alone and will not be able to pick it up after her surgery.  Communication Preference: [phone]  Additional Information:

## 2018-08-15 RX ORDER — ACETAMINOPHEN 500 MG
1000 TABLET ORAL 2 TIMES DAILY PRN
COMMUNITY
End: 2020-01-02

## 2018-08-15 NOTE — PRE-PROCEDURE INSTRUCTIONS
"Spoke with Patient.  NPO, medication, and pre-op instructions reviewed.  Talkative.  Denies previous problems with Anesthesia.  Stated that she has "a little trouble breathing."  Does not use Oxygen.  Unable to lie flat. Sleeps with extra pillows.  Lives alone.  Requesting Bedside Delivery for any post-op prescriptions.  Aspirin and Coumadin are on Hold.  Stated that she will be wearing a Depend Diaper.  She is not incontinent but feels safer with the Depend on.   Stated that she expectorates clear mucous.  Verbalized understanding of instructions.    "

## 2018-08-16 ENCOUNTER — ANESTHESIA (OUTPATIENT)
Dept: SURGERY | Facility: HOSPITAL | Age: 83
End: 2018-08-16
Payer: MEDICARE

## 2018-08-16 ENCOUNTER — HOSPITAL ENCOUNTER (OUTPATIENT)
Facility: HOSPITAL | Age: 83
Discharge: HOME | End: 2018-08-16
Attending: SURGERY | Admitting: SURGERY
Payer: MEDICARE

## 2018-08-16 ENCOUNTER — ANESTHESIA EVENT (OUTPATIENT)
Dept: SURGERY | Facility: HOSPITAL | Age: 83
End: 2018-08-16
Payer: MEDICARE

## 2018-08-16 VITALS
TEMPERATURE: 98 F | BODY MASS INDEX: 29.25 KG/M2 | DIASTOLIC BLOOD PRESSURE: 62 MMHG | SYSTOLIC BLOOD PRESSURE: 132 MMHG | RESPIRATION RATE: 18 BRPM | HEIGHT: 66 IN | HEART RATE: 75 BPM | WEIGHT: 182 LBS | OXYGEN SATURATION: 100 %

## 2018-08-16 DIAGNOSIS — R22.2 MASS ON BACK: ICD-10-CM

## 2018-08-16 PROCEDURE — 37000009 HC ANESTHESIA EA ADD 15 MINS: Performed by: SURGERY

## 2018-08-16 PROCEDURE — 71000015 HC POSTOP RECOV 1ST HR: Performed by: SURGERY

## 2018-08-16 PROCEDURE — 37000008 HC ANESTHESIA 1ST 15 MINUTES: Performed by: SURGERY

## 2018-08-16 PROCEDURE — 71000044 HC DOSC ROUTINE RECOVERY FIRST HOUR: Performed by: SURGERY

## 2018-08-16 PROCEDURE — D9220A PRA ANESTHESIA: Mod: ANES,,, | Performed by: ANESTHESIOLOGY

## 2018-08-16 PROCEDURE — 21552 EXC NECK LES SC 3 CM/>: CPT | Mod: ,,, | Performed by: SURGERY

## 2018-08-16 PROCEDURE — 88304 TISSUE EXAM BY PATHOLOGIST: CPT | Mod: 26,,, | Performed by: PATHOLOGY

## 2018-08-16 PROCEDURE — 63600175 PHARM REV CODE 636 W HCPCS: Performed by: NURSE ANESTHETIST, CERTIFIED REGISTERED

## 2018-08-16 PROCEDURE — 36000706: Performed by: SURGERY

## 2018-08-16 PROCEDURE — D9220A PRA ANESTHESIA: Mod: CRNA,,, | Performed by: NURSE ANESTHETIST, CERTIFIED REGISTERED

## 2018-08-16 PROCEDURE — 25000003 PHARM REV CODE 250: Performed by: PHYSICIAN ASSISTANT

## 2018-08-16 PROCEDURE — 88304 TISSUE EXAM BY PATHOLOGIST: CPT | Performed by: PATHOLOGY

## 2018-08-16 PROCEDURE — 25000003 PHARM REV CODE 250: Performed by: SURGERY

## 2018-08-16 PROCEDURE — 63600175 PHARM REV CODE 636 W HCPCS: Performed by: PHYSICIAN ASSISTANT

## 2018-08-16 PROCEDURE — 36000707: Performed by: SURGERY

## 2018-08-16 RX ORDER — PROPOFOL 10 MG/ML
VIAL (ML) INTRAVENOUS
Status: DISCONTINUED | OUTPATIENT
Start: 2018-08-16 | End: 2018-08-16

## 2018-08-16 RX ORDER — LIDOCAINE HCL/PF 100 MG/5ML
SYRINGE (ML) INTRAVENOUS
Status: DISCONTINUED | OUTPATIENT
Start: 2018-08-16 | End: 2018-08-16

## 2018-08-16 RX ORDER — SODIUM CHLORIDE 9 MG/ML
INJECTION, SOLUTION INTRAVENOUS CONTINUOUS
Status: DISCONTINUED | OUTPATIENT
Start: 2018-08-16 | End: 2018-08-16 | Stop reason: HOSPADM

## 2018-08-16 RX ORDER — HYDROCODONE BITARTRATE AND ACETAMINOPHEN 5; 325 MG/1; MG/1
1 TABLET ORAL EVERY 4 HOURS PRN
Status: DISCONTINUED | OUTPATIENT
Start: 2018-08-16 | End: 2018-08-16 | Stop reason: HOSPADM

## 2018-08-16 RX ORDER — SODIUM CHLORIDE 0.9 % (FLUSH) 0.9 %
3 SYRINGE (ML) INJECTION
Status: DISCONTINUED | OUTPATIENT
Start: 2018-08-16 | End: 2018-08-16 | Stop reason: HOSPADM

## 2018-08-16 RX ORDER — PROPOFOL 10 MG/ML
VIAL (ML) INTRAVENOUS CONTINUOUS PRN
Status: DISCONTINUED | OUTPATIENT
Start: 2018-08-16 | End: 2018-08-16

## 2018-08-16 RX ORDER — OXYCODONE AND ACETAMINOPHEN 5; 325 MG/1; MG/1
1 TABLET ORAL EVERY 6 HOURS PRN
Qty: 16 TABLET | Refills: 0 | Status: SHIPPED | OUTPATIENT
Start: 2018-08-16 | End: 2018-08-30

## 2018-08-16 RX ORDER — LIDOCAINE HYDROCHLORIDE 10 MG/ML
INJECTION INFILTRATION; PERINEURAL
Status: DISCONTINUED | OUTPATIENT
Start: 2018-08-16 | End: 2018-08-16 | Stop reason: HOSPADM

## 2018-08-16 RX ORDER — CEFAZOLIN SODIUM 1 G/3ML
2 INJECTION, POWDER, FOR SOLUTION INTRAMUSCULAR; INTRAVENOUS
Status: COMPLETED | OUTPATIENT
Start: 2018-08-16 | End: 2018-08-16

## 2018-08-16 RX ORDER — PHENYLEPHRINE HYDROCHLORIDE 10 MG/ML
INJECTION INTRAVENOUS
Status: DISCONTINUED | OUTPATIENT
Start: 2018-08-16 | End: 2018-08-16

## 2018-08-16 RX ORDER — HYDROMORPHONE HYDROCHLORIDE 1 MG/ML
0.2 INJECTION, SOLUTION INTRAMUSCULAR; INTRAVENOUS; SUBCUTANEOUS EVERY 5 MIN PRN
Status: DISCONTINUED | OUTPATIENT
Start: 2018-08-16 | End: 2018-08-16 | Stop reason: HOSPADM

## 2018-08-16 RX ORDER — FENTANYL CITRATE 50 UG/ML
INJECTION, SOLUTION INTRAMUSCULAR; INTRAVENOUS
Status: DISCONTINUED | OUTPATIENT
Start: 2018-08-16 | End: 2018-08-16

## 2018-08-16 RX ADMIN — LIDOCAINE HYDROCHLORIDE 50 MG: 20 INJECTION, SOLUTION INTRAVENOUS at 09:08

## 2018-08-16 RX ADMIN — FENTANYL CITRATE 25 MCG: 50 INJECTION, SOLUTION INTRAMUSCULAR; INTRAVENOUS at 09:08

## 2018-08-16 RX ADMIN — SODIUM CHLORIDE: 0.9 INJECTION, SOLUTION INTRAVENOUS at 08:08

## 2018-08-16 RX ADMIN — CEFAZOLIN 2 G: 330 INJECTION, POWDER, FOR SOLUTION INTRAMUSCULAR; INTRAVENOUS at 09:08

## 2018-08-16 RX ADMIN — PHENYLEPHRINE HYDROCHLORIDE 100 MCG: 10 INJECTION INTRAVENOUS at 09:08

## 2018-08-16 RX ADMIN — PROPOFOL 100 MCG/KG/MIN: 10 INJECTION, EMULSION INTRAVENOUS at 09:08

## 2018-08-16 RX ADMIN — PROPOFOL 50 MG: 10 INJECTION, EMULSION INTRAVENOUS at 09:08

## 2018-08-16 NOTE — INTERVAL H&P NOTE
The patient has been examined and the H&P has been reviewed:    I concur with the findings and no changes have occurred since H&P was written.    Anesthesia/Surgery risks, benefits and alternative options discussed and understood by patient/family.          Active Hospital Problems    Diagnosis  POA    Mass on back [R22.2]  Yes      Resolved Hospital Problems   No resolved problems to display.

## 2018-08-16 NOTE — ANESTHESIA POSTPROCEDURE EVALUATION
"Anesthesia Post Evaluation    Patient: Jenniffer Jimenez    Procedure(s) Performed: Procedure(s) (LRB):  EXCISION,LIPOMA - Left Axillary/Flank (Left)    Final Anesthesia Type: general  Patient location during evaluation: PACU  Patient participation: Yes- Able to Participate  Level of consciousness: awake and alert and awake  Post-procedure vital signs: reviewed and stable  Pain management: adequate  Airway patency: patent  PONV status at discharge: No PONV  Anesthetic complications: no      Cardiovascular status: blood pressure returned to baseline  Respiratory status: unassisted and spontaneous ventilation  Hydration status: euvolemic  Follow-up not needed.        Visit Vitals  /62 (BP Location: Right arm, Patient Position: Lying)   Pulse 75   Temp 36.7 °C (98.1 °F) (Temporal)   Resp 18   Ht 5' 6" (1.676 m)   Wt 82.6 kg (182 lb)   LMP  (LMP Unknown)   SpO2 100%   Breastfeeding? No   BMI 29.38 kg/m²       Pain/Hammad Score: Pain Assessment Performed: Yes (8/16/2018 11:00 AM)  Presence of Pain: denies (8/16/2018 11:00 AM)  Hammad Score: 10 (8/16/2018 10:03 AM)        "

## 2018-08-16 NOTE — OP NOTE
DATE OF PROCEDURE:  08/16/2018    PREOPERATIVE DIAGNOSIS:  Left axillary lipoma.    POSTOPERATIVE DIAGNOSIS:  Left axillary lipoma.    PROCEDURES:  1.  Excision of left axillary lipoma 4 cm.  2.  Intermediate level wound closure.    SURGEON:  Hao Ricketts M.D.    ASSISTANT:  Addi.    ANESTHESIA:  Intravenous sedation plus local 1% Xylocaine.    CLINICAL NOTE:  The patient is an 86-year-old female with a tender soft tissue   mass along the posterior left axilla.    PROCEDURE NOTE:  Following induction of adequate intravenous sedation, the   patient's axilla and chest were prepped and draped with ChloraPrep.  We   infiltrated 1% Xylocaine and made a 4 cm incision over this palpable mass.  We   dissected down and encountered a lipoma, which was dissected away from its   attachments to the subcutaneous tissue.  We then removed the specimen and sent   it to Pathology.  We then irrigated and obtained hemostasis with cautery and   then we closed the wound in layers of subcutaneous Vicryl and subcuticular 4-0   Monocryl sutures.  Sterile dressings were then applied and the procedure was   terminated with the patient tolerating it well.    ESTIMATED BLOOD LOSS:  3 mL.      MCT/IN  dd: 08/16/2018 12:41:31 (CDT)  td: 08/16/2018 13:26:31 (CDT)  Doc ID   #[id number]  Job ID #[id number]    CC:

## 2018-08-16 NOTE — PROGRESS NOTES
Pt called 08/16/18 to inform us that the Dr told her to start back on her coumadin on 08/17/18 and not on 8/16/18. ([phone]) please advise STAt.

## 2018-08-16 NOTE — DISCHARGE INSTRUCTIONS
OK to restart warfarin tomorrow, 8/17/18      Anesthesia: General Anesthesia     You are watched continuously during your procedure by your anesthesia provider.     Youre due to have surgery. During surgery, youll be given medicine called anesthesia or anesthetic. This will keep you comfortable and pain-free. Your anesthesia provider will use general anesthesia.  What is general anesthesia?  General anesthesia puts you into a state like deep sleep. It goes into the bloodstream (IV anesthetics), into the lungs (gas anesthetics), or both. You feel nothing during the procedure. You will not remember it. During the procedure, the anesthesia provider monitors you continuously. He or she checks your heart rate and rhythm, blood pressure, breathing, and blood oxygen.  · IV anesthetics. IV anesthetics are given through an IV line in your arm. Theyre often given first. This is so you are asleep before a gas anesthetic is started. Some kinds of IV anesthetics relieve pain. Others relax you. Your doctor will decide which kind is best in your case.  · Gas anesthetics. Gas anesthetics are breathed into the lungs. They are often used to keep you asleep. They can be given through a facemask or a tube placed in your larynx or trachea (breathing tube).  ¨ If you have a facemask, your anesthesia provider will most likely place it over your nose and mouth while youre still awake. Youll breathe oxygen through the mask as your IV anesthetic is started. Gas anesthetic may be added through the mask.  ¨ If you have a tube in the larynx or trachea, it will be inserted into your throat after youre asleep.  Anesthesia tools and medicines  You will likely have:  · IV anesthetics. These are put into an IV line into your bloodstream.  · Gas anesthetics. You breathe these anesthetics into your lungs, where they pass into your bloodstream.  · Pulse oximeter. This is a small clip that is attached to the end of your finger. This measures  your blood oxygen level.  · Electrocardiography leads (electrodes). These are small sticky pads that are placed on your chest. They record your heart rate and rhythm.  · Blood pressure cuff. This reads your blood pressure.  Risks and possible complications  General anesthesia has some risks. These include:  · Breathing problems  · Nausea and vomiting  · Sore throat or hoarseness (usually temporary)  · Allergic reaction to the anesthetic  · Irregular heartbeat (rare)  · Cardiac arrest (rare)   Anesthesia safety  · Follow all instructions you are given for how long not to eat or drink before your procedure.  · Be sure your doctor knows what medicines and drugs you take. This includes over-the-counter medicines, herbs, supplements, alcohol or other drugs. You will be asked when those were last taken.  · Have an adult family member or friend drive you home after the procedure.  · For the first 24 hours after your surgery:  ¨ Do not drive or use heavy equipment.  ¨ Do not make important decisions or sign legal documents. If important decisions or signing legal documents is necessary during the first 24 hours after surgery, have a trusted family member or spouse act on your behalf.  ¨ Avoid alcohol.  ¨ Have a responsible adult stay with you. He or she can watch for problems and help keep you safe.  Date Last Reviewed: 12/1/2016  © 3235-3380 [employer]. [address] [place] [address], [place] [address]. All rights reserved. This information is not intended as a substitute for professional medical care. Always follow your healthcare professional's instructions.

## 2018-08-16 NOTE — TRANSFER OF CARE
"Anesthesia Transfer of Care Note    Patient: Jenniffer Jimenez    Procedure(s) Performed: Procedure(s) (LRB):  EXCISION,LIPOMA - Left Axillary/Flank (Left)    Patient location: [facility]    Anesthesia Type: general    Transport from OR: Transported from OR on room air with adequate spontaneous ventilation    Post pain: adequate analgesia    Post assessment: no apparent anesthetic complications and tolerated procedure well    Post vital signs: stable    Level of consciousness: awake, alert and oriented    Nausea/Vomiting: no nausea/vomiting    Complications: none    Transfer of care protocol was followed      Last vitals:   Visit Vitals  BP (!) 141/67 (BP Location: Right arm, Patient Position: Lying)   Pulse 82   Temp 37.1 °C (98.8 °F) (Oral)   Resp 17   Ht 5' 6" (1.676 m)   Wt 82.6 kg (182 lb)   LMP  (LMP Unknown)   SpO2 100%   Breastfeeding? No   BMI 29.38 kg/m²     "

## 2018-08-16 NOTE — PLAN OF CARE
Pt and daughter given discharge instructions, pt and daughter verbalize understanding of discharge instructions.

## 2018-08-16 NOTE — ANESTHESIA PREPROCEDURE EVALUATION
08/16/2018  Jenniffer Jimenez is a 86 y.o., female.scheduled for strabismus repair pt. Has current URI     Pre-op Assessment    I have reviewed the Patient Summary Reports.     I have reviewed the Nursing Notes.   I have reviewed the Medications.     Review of Systems  Anesthesia Hx:  History of prior surgery of interest to airway management or planning: Previous anesthesia: General 4/22/13 left TKA with general anesthesia.  Denies Family Hx of Anesthesia complications.   Denies Personal Hx of Anesthesia complications.   Social:  Denies Tobacco Use. Denies Alcohol Use.  Denies Illicit Drug Use.  Hematology/Oncology:  Hematology Normal   Oncology Normal     EENT/Dental:   Caps upper and lower Eyes: Eye Symptoms: of dry eyes. 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     2 - Normal right ventricular systolic function .     3 - Indeterminate LV diastolic function.     4 - The estimated PA systolic pressure is 36 mmHg.     5 - Trivial to mild mitral regurgitation.     6 - Mild tricuspid regurgitation.     7 - Trivial pulmonic regurgitation.     8 - Intermediate central venous pressure.    Functional Capacity rides stationary bike 10-15 min 3 times a day denies SOB or CP   Coronary Artery Disease: (diastolic dysfunction)  Hypertension    Pulmonary:   Denies Shortness of breath. Recent URI (Dr. Ceja office notified pt. went to urgent care clinic today denies fever), unresolved  Pulmonary Symptoms: (chest congestion nasal drip and productive cough )  are currently symptomatic and productive cough.    Renal/:   Chronic Renal Disease, CRI    Hepatic/GI:   GERD (no longer on  meds. did not like the way it made her feel)    Musculoskeletal:   Arthritis   Musculoskeletal General/Symptoms: muscle weakness, generalized. Functional capacity is unlimited. Legs.   Neurological:   TIA,    Endocrine:  Metabolic Disorders, Hyperlipoproteinemia  Psych:   Psychiatric History          Physical Exam  General:  Well nourished, Obesity    Airway/Jaw/Neck:  Airway Findings: Mouth Opening: Normal Tongue: Normal  General Airway Assessment: Adult  Mallampati: I  Improves to I with phonation.  TM Distance: Normal, at least 6 cm        Eyes/Ears/Nose:  EYES/EARS/NOSE FINDINGS: Normal   Dental:  DENTAL FINDINGS: Normal   Chest/Lungs:  Chest/Lungs Findings: Clear to auscultation, Normal Respiratory Rate     Heart/Vascular:  Heart Findings: Rate: Normal  Rhythm: Regular Rhythm  Sounds: Normal     Abdomen:  Abdomen Findings: Normal    Musculoskeletal:  Musculoskeletal Findings: Normal   Skin:  Skin Findings: Normal    Mental Status:  Mental Status Findings:  Cooperative, Alert and Oriented         Anesthesia Plan  Type of Anesthesia, risks & benefits discussed:  Anesthesia Type:  general, MAC  Patient's Preference:   Intra-op Monitoring Plan: standard ASA monitors  Intra-op Monitoring Plan Comments:   Post Op Pain Control Plan: per primary service following discharge from PACU  Post Op Pain Control Plan Comments:   Induction:   IV  Beta Blocker:  Patient is not currently on a Beta-Blocker (No further documentation required).       Informed Consent: Patient understands risks and agrees with Anesthesia plan.  Questions answered. Anesthesia consent signed with patient.  ASA Score: 3     Day of Surgery Review of History & Physical:    H&P update referred to the surgeon.     Anesthesia Plan Notes: Lateral  local MAC        Ready For Surgery From Anesthesia Perspective.                                                                                                                    Ready For Surgery From Anesthesia  Perspective.          Lab Results   Component Value Date    WBC 4.68 07/20/2018    HGB 12.5 07/20/2018    HCT 37.5 07/20/2018    MCV 90 07/20/2018     07/20/2018     BMP  Lab Results   Component Value Date     07/31/2018    K 4.5 07/31/2018     07/31/2018    CO2 29 07/31/2018    BUN 17 07/31/2018    CREATININE 0.9 07/31/2018    CALCIUM 9.4 07/31/2018    ANIONGAP 8 07/31/2018    ESTGFRAFRICA >60.0 07/31/2018    EGFRNONAA 58.1 (A) 07/31/2018     Lab Results   Component Value Date    HGBA1C 5.8 (H) 07/20/2018

## 2018-08-16 NOTE — BRIEF OP NOTE
[facility]  Brief Operative Note     SUMMARY     Surgery Date: 8/16/2018     Surgeon(s) and Role:     * Hao Ricketts MD - Primary     * Keyshawn Fontana MD - Resident - Assisting        Pre-op Diagnosis:  Axillary mass, left [R22.32]    Post-op Diagnosis:  Post-Op Diagnosis Codes:     * Axillary mass, left [R22.32]    Procedure(s) (LRB):  EXCISION,LIPOMA - Left Axillary/Flank (Left)    Anesthesia: Local MAC    Description of the findings of the procedure: Approx 4 cm left flank lipoma removed    Findings/Key Components: Same    Estimated Blood Loss: 0 mL         Specimens:   Specimen (12h ago, onward)    Start     Ordered    08/16/18 0921  Specimen to Pathology - Surgery  Once     Comments:  1. Lipoma - left axilla - Permanent     Start Status   08/16/18 0921 Collected (08/16/18 0923)       08/16/18 0923          Discharge Note    SUMMARY     Admit Date: 8/16/2018    Discharge Date and Time:  08/16/2018 9:33 AM    Hospital Course (synopsis of major diagnoses, care, treatment, and services provided during the course of the hospital stay): Patient arrived for scheduled procedure. Patient tolerated the procedure well. Patient was discharged after recovery.        Final Diagnosis: Post-Op Diagnosis Codes:     * Axillary mass, left [R22.32]    Disposition: Home or Self Care    Follow Up/Patient Instructions:     Medications:  Reconciled Home Medications:      Medication List      CHANGE how you take these medications    fluticasone 50 mcg/actuation nasal spray  Commonly known as:  FLONASE  1 spray by Each Nare route once daily.  What changed:    · when to take this  · reasons to take this     furosemide 20 MG tablet  Commonly known as:  LASIX  TAKE 1 TABLET(20 MG) BY MOUTH EVERY DAY  What changed:  See the new instructions.     potassium chloride 10 MEQ Cpsr  Commonly known as:  MICRO-K  TAKE 1 CAPSULE(10 MEQ) BY MOUTH EVERY DAY  What changed:  See the new instructions.     pravastatin 40 MG  tablet  Commonly known as:  PRAVACHOL  TAKE 1 TABLET TWICE DAILY  What changed:  See the new instructions.     triamcinolone 55 mcg nasal inhaler  Commonly known as:  NASACORT  2 sprays by Nasal route once daily.  What changed:    · when to take this  · reasons to take this        CONTINUE taking these medications    acetaminophen 500 MG tablet  Commonly known as:  TYLENOL  Take 1,000 mg by mouth 2 (two) times daily as needed for Pain. Stated that per Cards, she can take only two doses per day prn     aspirin 81 MG EC tablet  Commonly known as:  ECOTRIN  Take 81 mg by mouth nightly.     SYSTANE (PROPYLENE GLYCOL) 0.4-0.3 % Drop  Generic drug:  peg 400-propylene glycol  Place 1-2 drops into both eyes as needed.     warfarin 3 MG tablet  Commonly known as:  COUMADIN  Take 3.5 mg by mouth once daily. 4.5mg 6 days per week, 3mg 1 day per week, followed by Coumadin clinic          Discharge Procedure Orders   Diet general     Call MD for:  temperature >100.4     Call MD for:  persistent nausea and vomiting     Call MD for:  severe uncontrolled pain     Call MD for:  redness, tenderness, or signs of infection (pain, swelling, redness, odor or green/yellow discharge around incision site)     Remove dressing in 48 hours   Order Comments: After that no dressing needed  Ok to shower with warm soap and water  Do not submerge in bath, pool, lake etc for 2 weeks     Follow-up Information     Hao Ricketts MD In 2 weeks.    Specialty:  General Surgery  Contact information:  [address] [facility] [address]  [phone]

## 2018-08-16 NOTE — PROGRESS NOTES
After her recent procedure, the pt is reporting that she was instructed to resume her coumadin on 8/17, rather than 8/16.  See calendar for dosing to reflect this change.

## 2018-08-21 ENCOUNTER — TELEPHONE (OUTPATIENT)
Dept: SURGERY | Facility: CLINIC | Age: 83
End: 2018-08-21

## 2018-08-21 NOTE — TELEPHONE ENCOUNTER
Spoke with patient as a f/u to her lipoma removal with Dr. Ricketts on 8/16/18.  She reports feeling well and only took 1 Percocet after surgery.  She is showering over the incision with soap and water and keeping the site clean and dry.  She will f/u as scheduled on 8/30/18.

## 2018-08-22 ENCOUNTER — ANTI-COAG VISIT (OUTPATIENT)
Dept: CARDIOLOGY | Facility: CLINIC | Age: 83
End: 2018-08-22
Payer: MEDICARE

## 2018-08-22 ENCOUNTER — CLINICAL SUPPORT (OUTPATIENT)
Dept: INTERNAL MEDICINE | Facility: CLINIC | Age: 83
End: 2018-08-22
Payer: MEDICARE

## 2018-08-22 DIAGNOSIS — I48.20 CHRONIC ATRIAL FIBRILLATION: ICD-10-CM

## 2018-08-22 DIAGNOSIS — J30.9 CHRONIC ALLERGIC RHINITIS: ICD-10-CM

## 2018-08-22 DIAGNOSIS — Z79.01 CURRENT USE OF LONG TERM ANTICOAGULATION: Primary | ICD-10-CM

## 2018-08-22 LAB — INR PPP: 1.6 (ref 2–3)

## 2018-08-22 PROCEDURE — 95115 IMMUNOTHERAPY ONE INJECTION: CPT | Mod: S$GLB,,, | Performed by: INTERNAL MEDICINE

## 2018-08-22 PROCEDURE — 99499 UNLISTED E&M SERVICE: CPT | Mod: S$GLB,,, | Performed by: ALLERGY & IMMUNOLOGY

## 2018-08-22 PROCEDURE — 85610 PROTHROMBIN TIME: CPT | Mod: QW,S$GLB,, | Performed by: INTERNAL MEDICINE

## 2018-08-22 PROCEDURE — 99211 OFF/OP EST MAY X REQ PHY/QHP: CPT | Mod: 25,S$GLB,, | Performed by: INTERNAL MEDICINE

## 2018-08-22 NOTE — PROGRESS NOTES
Quick follow-up for procedure 8/16 and 5 day prior procedure hold. INR improving but still low. Patient with bruises from use, denies any bleeding or changes. Will boost dose today and then resume weekly dose until follow-up next week. Advised her to call with any changes or concerns.  PRETTY THOMPSON assisted with dosing.

## 2018-08-22 NOTE — PROGRESS NOTES
Patient here for allergy injection.  No new meds, no problems with last injection.    0.5mL Red 1:1 given SQ in upper right arm.  Tolerated well.    Site assessed after 30 minutes.  +0 reaction noted.  No complaints voiced.

## 2018-08-28 ENCOUNTER — OFFICE VISIT (OUTPATIENT)
Dept: OPTOMETRY | Facility: CLINIC | Age: 83
End: 2018-08-28
Payer: MEDICARE

## 2018-08-28 DIAGNOSIS — H00.12 CHALAZION OF RIGHT LOWER EYELID: Primary | ICD-10-CM

## 2018-08-28 PROCEDURE — 99999 PR PBB SHADOW E&M-EST. PATIENT-LVL II: CPT | Mod: PBBFAC,,, | Performed by: OPTOMETRIST

## 2018-08-28 PROCEDURE — 92012 INTRM OPH EXAM EST PATIENT: CPT | Mod: S$GLB,,, | Performed by: OPTOMETRIST

## 2018-08-28 RX ORDER — NEOMYCIN SULFATE, POLYMYXIN B SULFATE AND DEXAMETHASONE 3.5; 10000; 1 MG/ML; [USP'U]/ML; MG/ML
1 SUSPENSION/ DROPS OPHTHALMIC 3 TIMES DAILY
Qty: 5 ML | Refills: 0 | Status: SHIPPED | OUTPATIENT
Start: 2018-08-28 | End: 2018-09-04

## 2018-08-28 NOTE — PROGRESS NOTES
HPI     Last eye exam was 2/16/18 with Dr. Hernandez.  Patient states RLL has been swollen and tender to touch for the past 2   weeks. Swelling has gone down this morning. Going on a trip and wanted to   make sure this was taken care of before leaving. No discharge. Hasn't done   any compresses.    Systane BID-TID OU    Last edited by Lorena Hameed on 8/28/2018  8:08 AM. (History)            Assessment /Plan     For exam results, see Encounter Report.    Chalazion of right lower eyelid  -     neomycin-polymyxin-dexamethasone (MAXITROL) 3.5mg/mL-10,000 unit/mL-0.1 % DrpS; Place 1 drop into the right eye 3 (three) times daily. for 7 days  Dispense: 5 mL; Refill: 0            1.  Recommend warm compresses at least 3x/day with digital massage.  Maxitrol tid x 1 week.  RTC as scheduled or sooner if condition does not resolve.

## 2018-08-30 ENCOUNTER — OFFICE VISIT (OUTPATIENT)
Dept: SURGERY | Facility: CLINIC | Age: 83
End: 2018-08-30
Payer: MEDICARE

## 2018-08-30 ENCOUNTER — ANTI-COAG VISIT (OUTPATIENT)
Dept: CARDIOLOGY | Facility: CLINIC | Age: 83
End: 2018-08-30
Payer: MEDICARE

## 2018-08-30 VITALS
WEIGHT: 182 LBS | SYSTOLIC BLOOD PRESSURE: 143 MMHG | BODY MASS INDEX: 29.25 KG/M2 | DIASTOLIC BLOOD PRESSURE: 70 MMHG | HEART RATE: 73 BPM | HEIGHT: 66 IN

## 2018-08-30 DIAGNOSIS — D17.20 LIPOMA OF UPPER ARM: Primary | ICD-10-CM

## 2018-08-30 DIAGNOSIS — I48.20 CHRONIC ATRIAL FIBRILLATION: ICD-10-CM

## 2018-08-30 DIAGNOSIS — Z79.01 CURRENT USE OF LONG TERM ANTICOAGULATION: Primary | ICD-10-CM

## 2018-08-30 LAB — INR PPP: 1.8 (ref 2–3)

## 2018-08-30 PROCEDURE — 99999 PR PBB SHADOW E&M-EST. PATIENT-LVL III: CPT | Mod: PBBFAC,,, | Performed by: PHYSICIAN ASSISTANT

## 2018-08-30 PROCEDURE — 99024 POSTOP FOLLOW-UP VISIT: CPT | Mod: S$GLB,,, | Performed by: PHYSICIAN ASSISTANT

## 2018-08-30 PROCEDURE — 85610 PROTHROMBIN TIME: CPT | Mod: PBBFAC

## 2018-08-30 NOTE — PROGRESS NOTES
Jenniffer Jimenez  86 y.o.    No diagnosis found.    SUBJECTIVE:  86 y.o.female presents s/p Excision of lipoma.  Patient reports that area is healing well.  Denies pain, drainage, fever or chills.     OBJECTIVE:  Posterior left shoulder - incision healing well, no erythema, no drainage, no tenderness with palpation.     Pathology- reviewed  FINAL PATHOLOGIC DIAGNOSIS  FRAGMENTS OF BENIGN ADIPOSE TISSUE, CONSISTENT WITH A LIPOMA    ASSESSMENT:  85 yo female s/p lipoma removal    PLAN:  Patient doing well after removal  Return to clinic prn.

## 2018-08-30 NOTE — PROGRESS NOTES
Quick follow-up for low INR 8/22. INR improving but still low. Patient with bruises from use, denies any bleeding or changes. Will increase weekly dose (no boost pt is elderly) and follow-up in 10 days. Advised her to call with any changes or concerns. PRETTY Lorenzo D assisted with dosing.

## 2018-09-04 ENCOUNTER — TELEPHONE (OUTPATIENT)
Dept: OPTOMETRY | Facility: CLINIC | Age: 83
End: 2018-09-04

## 2018-09-04 NOTE — TELEPHONE ENCOUNTER
----- Message from Damarimary ann Hernandez sent at 9/4/2018 12:09 PM CDT -----  Contact: Pt  Needs Advice    Reason for call: Calling to state her [facility] is not working so don't send a message via [facility], she stated she's not getting better and she no longer has swelling. Pt did state the tumor is the size as the cyst/tear duct. Pt stated she followed the doctor instructions and nothing has changed.      Communication Preference: [phone] or [phone]  Additional Information: N/A

## 2018-09-04 NOTE — TELEPHONE ENCOUNTER
Patient states still has bump RLL and drops and warm compresses haven't help. Per Dr. Duncan schedule appointment with Dr. Scott.

## 2018-09-05 ENCOUNTER — TELEPHONE (OUTPATIENT)
Dept: INTERNAL MEDICINE | Facility: CLINIC | Age: 83
End: 2018-09-05

## 2018-09-05 DIAGNOSIS — R74.8 ELEVATED ALKALINE PHOSPHATASE LEVEL: Primary | ICD-10-CM

## 2018-09-05 NOTE — TELEPHONE ENCOUNTER
Spoke to pt who is requesting labs to be checked to find base level and start CLA again and then to be retested to see if that increased her alkaline phosphatase. Please advise.

## 2018-09-05 NOTE — TELEPHONE ENCOUNTER
Not sure what she is asking. She wants to retest her alk phos now? It was tested in July and it was back to normal.

## 2018-09-05 NOTE — TELEPHONE ENCOUNTER
----- Message from Ginna Moore sent at 9/5/2018  3:19 PM CDT -----  Contact: Jenniffer Montañosesar [phone]  Jenniffer would like a call back in regards to CLA . Would like to see DR. Young on 9/17.

## 2018-09-10 ENCOUNTER — ANTI-COAG VISIT (OUTPATIENT)
Dept: CARDIOLOGY | Facility: CLINIC | Age: 83
End: 2018-09-10
Payer: MEDICARE

## 2018-09-10 ENCOUNTER — LAB VISIT (OUTPATIENT)
Dept: LAB | Facility: HOSPITAL | Age: 83
End: 2018-09-10
Payer: MEDICARE

## 2018-09-10 DIAGNOSIS — R74.8 ELEVATED ALKALINE PHOSPHATASE LEVEL: ICD-10-CM

## 2018-09-10 DIAGNOSIS — Z79.01 CURRENT USE OF LONG TERM ANTICOAGULATION: Primary | ICD-10-CM

## 2018-09-10 DIAGNOSIS — I48.20 CHRONIC ATRIAL FIBRILLATION: ICD-10-CM

## 2018-09-10 LAB
ALBUMIN SERPL BCP-MCNC: 3.7 G/DL
ALP SERPL-CCNC: 118 U/L
ALT SERPL W/O P-5'-P-CCNC: 11 U/L
ANION GAP SERPL CALC-SCNC: 9 MMOL/L
AST SERPL-CCNC: 15 U/L
BILIRUB SERPL-MCNC: 0.9 MG/DL
BUN SERPL-MCNC: 17 MG/DL
CALCIUM SERPL-MCNC: 9.3 MG/DL
CHLORIDE SERPL-SCNC: 104 MMOL/L
CO2 SERPL-SCNC: 27 MMOL/L
CREAT SERPL-MCNC: 0.9 MG/DL
EST. GFR  (AFRICAN AMERICAN): >60 ML/MIN/1.73 M^2
EST. GFR  (NON AFRICAN AMERICAN): 58.1 ML/MIN/1.73 M^2
GLUCOSE SERPL-MCNC: 109 MG/DL
INR PPP: 2.4 (ref 2–3)
POTASSIUM SERPL-SCNC: 3.9 MMOL/L
PROT SERPL-MCNC: 7.1 G/DL
SODIUM SERPL-SCNC: 140 MMOL/L

## 2018-09-10 PROCEDURE — 36415 COLL VENOUS BLD VENIPUNCTURE: CPT

## 2018-09-10 PROCEDURE — 80053 COMPREHEN METABOLIC PANEL: CPT

## 2018-09-10 PROCEDURE — 99211 OFF/OP EST MAY X REQ PHY/QHP: CPT | Mod: S$PBB,25,,

## 2018-09-10 PROCEDURE — 85610 PROTHROMBIN TIME: CPT | Mod: PBBFAC

## 2018-09-10 NOTE — PROGRESS NOTES
Quick follow up for subtherapeutic INR on 8/30. INR within therapeutic range today. Bruising noted to hands from use. Patient denies any bleeding or other changes that would affect warfarin therapy. Will maintain current dose. Patient advised to call coumadin clinic with any changes or concerns.

## 2018-09-10 NOTE — PROGRESS NOTES
Subjective:       Patient ID: Jenniffer Jimenez is a 86 y.o. female.    Chief Complaint: Follow-up    Patient is a 86 y.o.female who presents today for follow up      Vaccines: Influenza (yearly); Tetanus (up to date) ; Pneumovax (2010; Prevnar 2015); Zoster (declines)  Eye exam: sept 2015  Mammogram: July 2018  Gyn exam: Pt declines. Understands risks in declining this preventative exam  Colonoscopy: June 2009; repeat in June 2019  DEXA:2018  Exercise: yes; walking  Diet: healthy      1. Constipation: has been having a BM every four days. She admits to abdominal bloating as well. She is taking dulcolax and fleet enema and drinking 64 ounces of water. She is passing gas well.   Review of Systems   Constitutional: Negative for appetite change, chills, diaphoresis, fatigue and fever.   HENT: Negative for congestion, dental problem, ear discharge, ear pain, hearing loss, postnasal drip, sinus pressure and sore throat.    Eyes: Negative for discharge, redness and itching.   Respiratory: Negative for cough, chest tightness, shortness of breath and wheezing.    Cardiovascular: Negative for chest pain, palpitations and leg swelling.   Gastrointestinal: Positive for constipation. Negative for abdominal pain, diarrhea, nausea and vomiting.   Endocrine: Negative for cold intolerance and heat intolerance.   Genitourinary: Negative for difficulty urinating, frequency, hematuria and urgency.   Musculoskeletal: Negative for arthralgias, back pain, gait problem, myalgias and neck pain.   Skin: Negative for color change and rash.   Neurological: Negative for dizziness, syncope and headaches.   Hematological: Negative for adenopathy.   Psychiatric/Behavioral: Negative for behavioral problems and sleep disturbance. The patient is not nervous/anxious.        Objective:      Physical Exam   Constitutional: She is oriented to person, place, and time. She appears well-developed and well-nourished. No distress.   HENT:   Head:  Normocephalic and atraumatic.   Right Ear: External ear normal.   Left Ear: External ear normal.   Nose: Nose normal.   Mouth/Throat: Oropharynx is clear and moist. No oropharyngeal exudate.   Eyes: Conjunctivae and EOM are normal. Pupils are equal, round, and reactive to light. Right eye exhibits no discharge. Left eye exhibits no discharge. No scleral icterus.   Neck: Normal range of motion. Neck supple. No JVD present. No thyromegaly present.   Cardiovascular: Normal rate, regular rhythm, normal heart sounds and intact distal pulses. Exam reveals no gallop and no friction rub.   No murmur heard.  Pulmonary/Chest: Effort normal and breath sounds normal. No stridor. No respiratory distress. She has no wheezes. She has no rales. She exhibits no tenderness.   Abdominal: Soft. Bowel sounds are normal. She exhibits no distension. There is no tenderness. There is no rebound.   Musculoskeletal: Normal range of motion. She exhibits no edema or tenderness.   Lymphadenopathy:     She has no cervical adenopathy.   Neurological: She is alert and oriented to person, place, and time. No cranial nerve deficit.   Skin: Skin is warm and dry. No rash noted. She is not diaphoretic. No erythema.   Psychiatric: She has a normal mood and affect. Her behavior is normal.   Nursing note and vitals reviewed.      Assessment and Plan:       1. Arthritis  - stable    2. Prediabetes  - stable    3. Chronic kidney disease, stage III (moderate)  - stable  - Comprehensive metabolic panel; Future    4. Pure hypercholesterolemia  - stable  - Comprehensive metabolic panel; Future    5. Chronic diastolic heart failure  - stable on meds  - Comprehensive metabolic panel; Future    6. Atherosclerosis of aorta  - stable on meds    7. Constipation, unspecified constipation type  - trial of lactulose  - X-Ray Abdomen AP 1 View; Future    8. Abdominal bloating  - X-Ray Abdomen AP 1 View; Future          No Follow-up on file.

## 2018-09-16 ENCOUNTER — PATIENT MESSAGE (OUTPATIENT)
Dept: INTERNAL MEDICINE | Facility: CLINIC | Age: 83
End: 2018-09-16

## 2018-09-17 ENCOUNTER — PATIENT MESSAGE (OUTPATIENT)
Dept: INTERNAL MEDICINE | Facility: CLINIC | Age: 83
End: 2018-09-17

## 2018-09-17 ENCOUNTER — HOSPITAL ENCOUNTER (OUTPATIENT)
Dept: RADIOLOGY | Facility: HOSPITAL | Age: 83
Discharge: HOME | End: 2018-09-17
Attending: INTERNAL MEDICINE
Payer: MEDICARE

## 2018-09-17 ENCOUNTER — OFFICE VISIT (OUTPATIENT)
Dept: INTERNAL MEDICINE | Facility: CLINIC | Age: 83
End: 2018-09-17
Payer: MEDICARE

## 2018-09-17 ENCOUNTER — CLINICAL SUPPORT (OUTPATIENT)
Dept: INTERNAL MEDICINE | Facility: CLINIC | Age: 83
End: 2018-09-17
Payer: MEDICARE

## 2018-09-17 VITALS
HEART RATE: 77 BPM | RESPIRATION RATE: 16 BRPM | BODY MASS INDEX: 29.41 KG/M2 | DIASTOLIC BLOOD PRESSURE: 67 MMHG | WEIGHT: 183 LBS | TEMPERATURE: 99 F | SYSTOLIC BLOOD PRESSURE: 118 MMHG | HEIGHT: 66 IN

## 2018-09-17 DIAGNOSIS — R14.0 ABDOMINAL BLOATING: ICD-10-CM

## 2018-09-17 DIAGNOSIS — K59.00 CONSTIPATION, UNSPECIFIED CONSTIPATION TYPE: ICD-10-CM

## 2018-09-17 DIAGNOSIS — E78.00 PURE HYPERCHOLESTEROLEMIA: ICD-10-CM

## 2018-09-17 DIAGNOSIS — J30.9 CHRONIC ALLERGIC RHINITIS: ICD-10-CM

## 2018-09-17 DIAGNOSIS — M19.90 ARTHRITIS: Primary | ICD-10-CM

## 2018-09-17 DIAGNOSIS — I50.32 CHRONIC DIASTOLIC HEART FAILURE: ICD-10-CM

## 2018-09-17 DIAGNOSIS — R73.03 PREDIABETES: ICD-10-CM

## 2018-09-17 DIAGNOSIS — I70.0 ATHEROSCLEROSIS OF AORTA: ICD-10-CM

## 2018-09-17 DIAGNOSIS — N18.30 CHRONIC KIDNEY DISEASE, STAGE III (MODERATE): ICD-10-CM

## 2018-09-17 PROCEDURE — 74018 RADEX ABDOMEN 1 VIEW: CPT | Mod: TC,PO

## 2018-09-17 PROCEDURE — 99499 UNLISTED E&M SERVICE: CPT | Mod: S$PBB,,, | Performed by: ALLERGY & IMMUNOLOGY

## 2018-09-17 PROCEDURE — 99213 OFFICE O/P EST LOW 20 MIN: CPT | Mod: PBBFAC,PO,25 | Performed by: INTERNAL MEDICINE

## 2018-09-17 PROCEDURE — 1101F PT FALLS ASSESS-DOCD LE1/YR: CPT | Mod: CPTII,,, | Performed by: INTERNAL MEDICINE

## 2018-09-17 PROCEDURE — 95115 IMMUNOTHERAPY ONE INJECTION: CPT | Mod: PBBFAC,PO

## 2018-09-17 PROCEDURE — 99213 OFFICE O/P EST LOW 20 MIN: CPT | Mod: S$PBB,,, | Performed by: INTERNAL MEDICINE

## 2018-09-17 PROCEDURE — 74018 RADEX ABDOMEN 1 VIEW: CPT | Mod: 26,,, | Performed by: RADIOLOGY

## 2018-09-17 PROCEDURE — 99999 PR PBB SHADOW E&M-EST. PATIENT-LVL III: CPT | Mod: PBBFAC,,, | Performed by: INTERNAL MEDICINE

## 2018-09-17 RX ORDER — LACTULOSE 10 G/15ML
SOLUTION ORAL; RECTAL
Qty: 4257 ML | Refills: 0 | OUTPATIENT
Start: 2018-09-17

## 2018-09-17 RX ORDER — LACTULOSE 10 G/15ML
30 SOLUTION ORAL DAILY
Qty: 450 ML | Refills: 0 | Status: SHIPPED | OUTPATIENT
Start: 2018-09-17 | End: 2019-02-15

## 2018-09-17 NOTE — PROGRESS NOTES
Patient here for allergy injection.  No new meds, no problems with last injection.    0.5mL Red 1:1 given SQ in upper right arm.  Tolerated well.    Site assessed after 30 minutes.  1+ reaction noted.  No complaints voiced.

## 2018-09-24 ENCOUNTER — ANTI-COAG VISIT (OUTPATIENT)
Dept: CARDIOLOGY | Facility: CLINIC | Age: 83
End: 2018-09-24
Payer: MEDICARE

## 2018-09-24 ENCOUNTER — PROCEDURE VISIT (OUTPATIENT)
Dept: OPHTHALMOLOGY | Facility: CLINIC | Age: 83
End: 2018-09-24
Payer: MEDICARE

## 2018-09-24 VITALS — HEART RATE: 70 BPM | DIASTOLIC BLOOD PRESSURE: 75 MMHG | SYSTOLIC BLOOD PRESSURE: 119 MMHG

## 2018-09-24 DIAGNOSIS — H00.12 CHALAZION OF RIGHT LOWER EYELID: Primary | ICD-10-CM

## 2018-09-24 DIAGNOSIS — I48.20 CHRONIC ATRIAL FIBRILLATION: ICD-10-CM

## 2018-09-24 DIAGNOSIS — Z96.1 PSEUDOPHAKIA, BOTH EYES: ICD-10-CM

## 2018-09-24 DIAGNOSIS — Z79.01 CURRENT USE OF LONG TERM ANTICOAGULATION: Primary | ICD-10-CM

## 2018-09-24 LAB — INR PPP: 2.6 (ref 2–3)

## 2018-09-24 PROCEDURE — 67800 REMOVE EYELID LESION: CPT | Mod: S$PBB,E4,, | Performed by: OPHTHALMOLOGY

## 2018-09-24 PROCEDURE — 92012 INTRM OPH EXAM EST PATIENT: CPT | Mod: S$PBB,25,, | Performed by: OPHTHALMOLOGY

## 2018-09-24 PROCEDURE — 99211 OFF/OP EST MAY X REQ PHY/QHP: CPT | Mod: S$PBB,25,,

## 2018-09-24 PROCEDURE — 67800 REMOVE EYELID LESION: CPT | Mod: PBBFAC,PO | Performed by: OPHTHALMOLOGY

## 2018-09-24 PROCEDURE — 85610 PROTHROMBIN TIME: CPT | Mod: PBBFAC,PO

## 2018-09-24 RX ORDER — LACTULOSE 10 G/15ML
SOLUTION ORAL; RECTAL
COMMUNITY
Start: 2018-09-17 | End: 2018-10-17 | Stop reason: SDUPTHER

## 2018-09-24 NOTE — PROGRESS NOTES
Subjective:       Patient ID: Jenniffer Jimenez is a 86 y.o. female.    Chief Complaint: Eye Problem (Possible chalazion right lower lid. )    HPI     Eye Problem      Additional comments: Possible chalazion right lower lid.               Comments     86 y.o. Female is here for Possible chalazion right lower lid. Denies eye   pain. After waking up in the morning right eye is wet per pt. No blurred   vision. Sometimes right lower lid is a little sore. No color discharge.   Itching right eye and sometimes left eye. No burning. Use hot compresses   four times a day OD. Used Neomyicn and Polymyxin B in the past with no   improvements.     Eye Meds: no gtt            Last edited by NORI Awan on 9/24/2018  3:39 PM. (History)             Assessment:       1. Chalazion of right lower eyelid    2. Pseudophakia, both eyes        Plan:       RLL chalazion-Pt wants it removed today.        Chalazion excision today.  Maxitrol buffy OD bid-tid x 5 days.  RTC 2 mos for full eye exam.      Procedure note:2% xylocaine injected into RLL. Betadine prep to RLL/RUL. Clamp placed on RLL & lid was everted. Vertical incision made into palpebral conj. Curette & Q-tips were used to remove lesion. Cautery used for hemostasis. Maxitrol buffy applied OD. Pt tolerated procedure well.

## 2018-09-24 NOTE — PROGRESS NOTES
INR within normal range today. Patient with bruises on arms from use, denies any bleeding or changes. Patient appears stable on this dose. She will continue this dose until follow-up in 3 weeks. I advised her to contact us with any changes or problems.

## 2018-10-12 ENCOUNTER — PES CALL (OUTPATIENT)
Dept: ADMINISTRATIVE | Facility: CLINIC | Age: 83
End: 2018-10-12

## 2018-10-15 ENCOUNTER — ANTI-COAG VISIT (OUTPATIENT)
Dept: CARDIOLOGY | Facility: CLINIC | Age: 83
End: 2018-10-15
Payer: MEDICARE

## 2018-10-15 ENCOUNTER — CLINICAL SUPPORT (OUTPATIENT)
Dept: INTERNAL MEDICINE | Facility: CLINIC | Age: 83
End: 2018-10-15
Payer: MEDICARE

## 2018-10-15 DIAGNOSIS — Z79.01 CURRENT USE OF LONG TERM ANTICOAGULATION: Primary | ICD-10-CM

## 2018-10-15 DIAGNOSIS — I48.20 CHRONIC ATRIAL FIBRILLATION: ICD-10-CM

## 2018-10-15 DIAGNOSIS — J30.9 CHRONIC ALLERGIC RHINITIS: ICD-10-CM

## 2018-10-15 LAB — INR PPP: 2.5 (ref 2–3)

## 2018-10-15 PROCEDURE — 85610 PROTHROMBIN TIME: CPT | Mod: PBBFAC,PO

## 2018-10-15 PROCEDURE — 95115 IMMUNOTHERAPY ONE INJECTION: CPT | Mod: PBBFAC,PO

## 2018-10-15 PROCEDURE — 99211 OFF/OP EST MAY X REQ PHY/QHP: CPT | Mod: S$PBB,25,, | Performed by: INTERNAL MEDICINE

## 2018-10-15 PROCEDURE — 99499 UNLISTED E&M SERVICE: CPT | Mod: S$PBB,,, | Performed by: ALLERGY & IMMUNOLOGY

## 2018-10-16 RX ORDER — WARFARIN 3 MG/1
4.5 TABLET ORAL DAILY
Qty: 45 TABLET | Refills: 3 | Status: SHIPPED | OUTPATIENT
Start: 2018-10-16 | End: 2018-11-14

## 2018-10-17 RX ORDER — LACTULOSE 10 G/15ML
30 SOLUTION ORAL; RECTAL DAILY
Qty: 1350 ML | Refills: 0 | Status: SHIPPED | OUTPATIENT
Start: 2018-10-17 | End: 2019-02-15

## 2018-10-17 NOTE — TELEPHONE ENCOUNTER
----- Message from Alexandrajuan Polo sent at 10/17/2018 10:03 AM CDT -----  Contact: Pt Mobile/Home [phone]  RX request - refill or new RX.  Is this a refill or new RX:  Refill  RX name and strength: GENERLAC 10 gram/15 mL solution  Directions:   Is this a 30 day or 90 day RX:  30  Local pharmacy or mail order pharmacy:  [facility] [address] [place] [address] [address]  AT [facility] OF [facility] & [address]  Pharmacy name and phone # [facility] Phone # [phone],Fax# [phone]   Comments: Patient would like a call back in regards to her going on a trip for four days and she would like to ask you some questions about taking her medication.

## 2018-10-17 NOTE — TELEPHONE ENCOUNTER
Pt requesting medication refill. She states that when she doesn't take the medication that she becomes constipated but when she does take it she has diarrhea. She stated that that she normally takes 3 tsp of the generlac and wanted to know if she should decrease. Pt will be on a train for 4 days.

## 2018-10-17 NOTE — TELEPHONE ENCOUNTER
Spoke to pt and informed that she can take anywhere between 1 tsp up to 3 tsp as needed per Dr. Espinoza.

## 2018-10-17 NOTE — TELEPHONE ENCOUNTER
Refill sent to the pharmacy.  Patient should take the medication as needed for constipation.  Please inform the patient. Thank you.

## 2018-10-17 NOTE — PROGRESS NOTES
Pt called 10/17/18 to inform us that she for got to take (4.5mg warfarin) on 10/16/18. Please advise

## 2018-10-18 ENCOUNTER — LAB VISIT (OUTPATIENT)
Dept: LAB | Facility: HOSPITAL | Age: 83
End: 2018-10-18
Attending: INTERNAL MEDICINE
Payer: MEDICARE

## 2018-10-18 ENCOUNTER — PATIENT MESSAGE (OUTPATIENT)
Dept: INTERNAL MEDICINE | Facility: CLINIC | Age: 83
End: 2018-10-18

## 2018-10-18 DIAGNOSIS — E78.00 PURE HYPERCHOLESTEROLEMIA: ICD-10-CM

## 2018-10-18 DIAGNOSIS — N18.30 CHRONIC KIDNEY DISEASE, STAGE III (MODERATE): ICD-10-CM

## 2018-10-18 DIAGNOSIS — I50.32 CHRONIC DIASTOLIC HEART FAILURE: ICD-10-CM

## 2018-10-18 LAB
ALBUMIN SERPL BCP-MCNC: 3.6 G/DL
ALP SERPL-CCNC: 126 U/L
ALT SERPL W/O P-5'-P-CCNC: 14 U/L
ANION GAP SERPL CALC-SCNC: 8 MMOL/L
AST SERPL-CCNC: 17 U/L
BILIRUB SERPL-MCNC: 0.6 MG/DL
BUN SERPL-MCNC: 14 MG/DL
CALCIUM SERPL-MCNC: 9.3 MG/DL
CHLORIDE SERPL-SCNC: 106 MMOL/L
CO2 SERPL-SCNC: 27 MMOL/L
CREAT SERPL-MCNC: 0.9 MG/DL
EST. GFR  (AFRICAN AMERICAN): >60 ML/MIN/1.73 M^2
EST. GFR  (NON AFRICAN AMERICAN): 58.1 ML/MIN/1.73 M^2
GLUCOSE SERPL-MCNC: 102 MG/DL
POTASSIUM SERPL-SCNC: 4.3 MMOL/L
PROT SERPL-MCNC: 6.7 G/DL
SODIUM SERPL-SCNC: 141 MMOL/L

## 2018-10-18 PROCEDURE — 80053 COMPREHEN METABOLIC PANEL: CPT

## 2018-10-18 PROCEDURE — 36415 COLL VENOUS BLD VENIPUNCTURE: CPT

## 2018-10-19 ENCOUNTER — PATIENT MESSAGE (OUTPATIENT)
Dept: INTERNAL MEDICINE | Facility: CLINIC | Age: 83
End: 2018-10-19

## 2018-10-19 ENCOUNTER — HOSPITAL ENCOUNTER (OUTPATIENT)
Dept: RADIOLOGY | Facility: HOSPITAL | Age: 83
Discharge: HOME | End: 2018-10-19
Attending: INTERNAL MEDICINE
Payer: MEDICARE

## 2018-10-19 ENCOUNTER — OFFICE VISIT (OUTPATIENT)
Dept: INTERNAL MEDICINE | Facility: CLINIC | Age: 83
End: 2018-10-19
Payer: MEDICARE

## 2018-10-19 VITALS
TEMPERATURE: 98 F | BODY MASS INDEX: 29.16 KG/M2 | DIASTOLIC BLOOD PRESSURE: 62 MMHG | OXYGEN SATURATION: 98 % | WEIGHT: 181.44 LBS | SYSTOLIC BLOOD PRESSURE: 118 MMHG | HEIGHT: 66 IN | RESPIRATION RATE: 18 BRPM | HEART RATE: 75 BPM

## 2018-10-19 DIAGNOSIS — R19.8 CHANGE IN BOWEL MOVEMENT: ICD-10-CM

## 2018-10-19 DIAGNOSIS — R14.3 FLATULENCE: ICD-10-CM

## 2018-10-19 DIAGNOSIS — R14.3 FLATULENCE: Primary | ICD-10-CM

## 2018-10-19 PROCEDURE — 99214 OFFICE O/P EST MOD 30 MIN: CPT | Mod: PBBFAC,PO | Performed by: INTERNAL MEDICINE

## 2018-10-19 PROCEDURE — 74018 RADEX ABDOMEN 1 VIEW: CPT | Mod: TC,PO

## 2018-10-19 PROCEDURE — 99999 PR PBB SHADOW E&M-EST. PATIENT-LVL IV: CPT | Mod: PBBFAC,,, | Performed by: INTERNAL MEDICINE

## 2018-10-19 PROCEDURE — 74018 RADEX ABDOMEN 1 VIEW: CPT | Mod: 26,,, | Performed by: RADIOLOGY

## 2018-10-19 PROCEDURE — 99213 OFFICE O/P EST LOW 20 MIN: CPT | Mod: S$PBB,,, | Performed by: INTERNAL MEDICINE

## 2018-10-19 PROCEDURE — 1101F PT FALLS ASSESS-DOCD LE1/YR: CPT | Mod: CPTII,,, | Performed by: INTERNAL MEDICINE

## 2018-10-19 NOTE — PROGRESS NOTES
"Subjective:       Patient ID: Jenniffer Jimenez is a 86 y.o. female.    Chief Complaint: Abdominal Pain (pt had been taking medication for constipation for 6 weeks, suddenly started having diarrhea yesterday.); Diarrhea; and Fatigue    HPI    She has been taking lactulose for constipation, but hadn't been taking it much this week. She reports eating from the salad bar from [employer] yesterday then had distended abdomen but soft and abdominal cramping. She had several BMs yesterday. No fevers. No nausea or vomiting.     She is going on a trip via train on Monday.  Review of Systems   Constitutional: Negative for chills and fever.   Respiratory: Negative for shortness of breath.    Cardiovascular: Negative for chest pain.   Gastrointestinal: Positive for abdominal distention, abdominal pain (cramping), constipation and diarrhea. Negative for anal bleeding, blood in stool, nausea, rectal pain and vomiting.   Genitourinary: Negative for difficulty urinating.       Objective:        Vitals:    10/19/18 0829   BP: 118/62   Pulse: 75   Resp: 18   Temp: 98 °F (36.7 °C)   SpO2: 98%   Weight: 82.3 kg (181 lb 7 oz)   Height: 5' 6" (1.676 m)       Body mass index is 29.28 kg/m².    Physical Exam   Constitutional: She appears well-developed and well-nourished. No distress.   HENT:   Head: Normocephalic and atraumatic.   Nose: Nose normal.   Eyes: Conjunctivae and EOM are normal. Right eye exhibits no discharge. Left eye exhibits no discharge.   Neck: Normal range of motion. Neck supple.   Cardiovascular: Normal rate, regular rhythm, normal heart sounds and intact distal pulses.   Pulmonary/Chest: Effort normal and breath sounds normal.   Abdominal: Soft. Bowel sounds are normal. She exhibits no distension and no ascites. There is no tenderness. There is no rigidity, no rebound, no guarding and negative Castellon's sign.   Musculoskeletal: She exhibits no edema.   Neurological: She is alert.   Skin: Skin is warm and dry. She is not " diaphoretic. No erythema.   Psychiatric: She has a normal mood and affect. Her behavior is normal. Thought content normal.       Assessment:     1. Flatulence    2. Change in bowel movement           Plan:         1. Flatulence  - simethicone for gas pain  - X-Ray Abdomen AP 1 View; Future    2. Change in bowel movement  - recommend starting probiotics over the counter daily  - she reports having to strain for bowel movements typically for which I have recommended that she start Colace daily and use lactulose for severe constipation if no bowel movement in several days.  Stay well hydrated and encouraged physical activity.           Patient note was created using MModal Dictation.  Any errors in syntax or even information may not have been identified and edited on initial review prior to signing this note.

## 2018-10-22 ENCOUNTER — PATIENT MESSAGE (OUTPATIENT)
Dept: INTERNAL MEDICINE | Facility: CLINIC | Age: 83
End: 2018-10-22

## 2018-11-07 RX ORDER — FUROSEMIDE 20 MG/1
TABLET ORAL
Qty: 90 TABLET | Refills: 2 | Status: SHIPPED | OUTPATIENT
Start: 2018-11-07 | End: 2019-02-15 | Stop reason: SDUPTHER

## 2018-11-08 RX ORDER — FUROSEMIDE 20 MG/1
TABLET ORAL
Qty: 90 TABLET | Refills: 3 | Status: SHIPPED | OUTPATIENT
Start: 2018-11-08 | End: 2019-02-15 | Stop reason: SDUPTHER

## 2018-11-12 ENCOUNTER — ANTI-COAG VISIT (OUTPATIENT)
Dept: CARDIOLOGY | Facility: CLINIC | Age: 83
End: 2018-11-12
Payer: MEDICARE

## 2018-11-12 DIAGNOSIS — Z79.01 CURRENT USE OF LONG TERM ANTICOAGULATION: Primary | ICD-10-CM

## 2018-11-12 DIAGNOSIS — I48.20 CHRONIC ATRIAL FIBRILLATION: ICD-10-CM

## 2018-11-12 LAB — INR PPP: 2.5 (ref 2–3)

## 2018-11-12 PROCEDURE — 99211 OFF/OP EST MAY X REQ PHY/QHP: CPT | Mod: 25,S$GLB,, | Performed by: INTERNAL MEDICINE

## 2018-11-12 PROCEDURE — 85610 PROTHROMBIN TIME: CPT | Mod: QW,S$GLB,, | Performed by: INTERNAL MEDICINE

## 2018-11-12 NOTE — PROGRESS NOTES
Pt seen by Jade SERRATO . I have reviewed her initial findings and agree with her assessment and plan

## 2018-11-12 NOTE — PROGRESS NOTES
INR within therapeutic range today. Bruising noted to arms from use. Patient denies any bleeding or other changes that would affect warfarin therapy. Will maintain current dose and follow up in 4 weeks. Patient advised to call coumadin clinic with any changes or concerns. Care plan made with J cordaro Pharm D.

## 2018-11-14 ENCOUNTER — OFFICE VISIT (OUTPATIENT)
Dept: OPHTHALMOLOGY | Facility: CLINIC | Age: 83
End: 2018-11-14
Payer: MEDICARE

## 2018-11-14 ENCOUNTER — CLINICAL SUPPORT (OUTPATIENT)
Dept: INTERNAL MEDICINE | Facility: CLINIC | Age: 83
End: 2018-11-14
Payer: MEDICARE

## 2018-11-14 DIAGNOSIS — J31.0 CHRONIC RHINITIS: ICD-10-CM

## 2018-11-14 DIAGNOSIS — Z96.1 PSEUDOPHAKIA, BOTH EYES: ICD-10-CM

## 2018-11-14 DIAGNOSIS — H43.813 POSTERIOR VITREOUS DETACHMENT, BILATERAL: ICD-10-CM

## 2018-11-14 DIAGNOSIS — H34.8122 STABLE CENTRAL RETINAL VEIN OCCLUSION OF LEFT EYE: Primary | ICD-10-CM

## 2018-11-14 DIAGNOSIS — H04.123 DRY EYE SYNDROME OF BOTH EYES: ICD-10-CM

## 2018-11-14 DIAGNOSIS — H52.7 REFRACTIVE ERROR: ICD-10-CM

## 2018-11-14 PROBLEM — H04.129 DRY EYE SYNDROME: Status: ACTIVE | Noted: 2018-11-14

## 2018-11-14 PROCEDURE — 99999 PR PBB SHADOW E&M-EST. PATIENT-LVL II: CPT | Mod: PBBFAC,HCNC,, | Performed by: OPHTHALMOLOGY

## 2018-11-14 PROCEDURE — 99499 UNLISTED E&M SERVICE: CPT | Mod: HCNC,S$GLB,, | Performed by: ALLERGY & IMMUNOLOGY

## 2018-11-14 PROCEDURE — 92014 COMPRE OPH EXAM EST PT 1/>: CPT | Mod: HCNC,S$GLB,, | Performed by: OPHTHALMOLOGY

## 2018-11-14 PROCEDURE — 95115 IMMUNOTHERAPY ONE INJECTION: CPT | Mod: HCNC,S$GLB,, | Performed by: FAMILY MEDICINE

## 2018-11-15 NOTE — PROGRESS NOTES
Subjective:       Patient ID: Jenniffer Jimenez is a 86 y.o. female.    Chief Complaint: Eye Exam    HPI     DSL- 9/24/18     87 y/o female is here for routine eye exam. Pt c/o of discharge in the   morning. Pt has eye allergies. Pt sleeps with Cat at night. Pt occas has   floaters/flashes.     Eyemeds  Systane OU PRN    Last edited by Johan Cardona on 11/14/2018  9:47 AM. (History)             Assessment:       1. 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 Dry eye syndrome of both eyes    3. Posterior vitreous detachment, bilateral    4. Refractive error    5. Pseudophakia, both eyes        Plan:       H/o CRVO OS-Stable. No NV or ME.  SARAH-Needs more AT's.  PVD's OU-Stable.  RE-Pt does not want MRx.        AT's.  RTC 1 yr.

## 2018-11-20 ENCOUNTER — NURSE TRIAGE (OUTPATIENT)
Dept: ADMINISTRATIVE | Facility: CLINIC | Age: 83
End: 2018-11-20

## 2018-11-20 ENCOUNTER — HOSPITAL ENCOUNTER (OUTPATIENT)
Dept: RADIOLOGY | Facility: HOSPITAL | Age: 83
Discharge: HOME | End: 2018-11-20
Attending: FAMILY MEDICINE
Payer: MEDICARE

## 2018-11-20 ENCOUNTER — OFFICE VISIT (OUTPATIENT)
Dept: INTERNAL MEDICINE | Facility: CLINIC | Age: 83
End: 2018-11-20
Payer: MEDICARE

## 2018-11-20 VITALS
WEIGHT: 187.63 LBS | HEIGHT: 66 IN | BODY MASS INDEX: 30.16 KG/M2 | DIASTOLIC BLOOD PRESSURE: 62 MMHG | SYSTOLIC BLOOD PRESSURE: 140 MMHG | RESPIRATION RATE: 18 BRPM | TEMPERATURE: 98 F | HEART RATE: 83 BPM | OXYGEN SATURATION: 95 %

## 2018-11-20 DIAGNOSIS — R20.0 NUMBNESS AND TINGLING OF FOOT: ICD-10-CM

## 2018-11-20 DIAGNOSIS — R20.0 NUMBNESS AND TINGLING OF FOOT: Primary | ICD-10-CM

## 2018-11-20 DIAGNOSIS — R20.2 NUMBNESS AND TINGLING OF FOOT: ICD-10-CM

## 2018-11-20 DIAGNOSIS — R20.2 NUMBNESS AND TINGLING OF FOOT: Primary | ICD-10-CM

## 2018-11-20 PROCEDURE — 99214 OFFICE O/P EST MOD 30 MIN: CPT | Mod: HCNC,S$GLB,, | Performed by: FAMILY MEDICINE

## 2018-11-20 PROCEDURE — 1101F PT FALLS ASSESS-DOCD LE1/YR: CPT | Mod: CPTII,HCNC,S$GLB, | Performed by: FAMILY MEDICINE

## 2018-11-20 PROCEDURE — 73630 X-RAY EXAM OF FOOT: CPT | Mod: TC,HCNC,PO,RT

## 2018-11-20 PROCEDURE — 99999 PR PBB SHADOW E&M-EST. PATIENT-LVL IV: CPT | Mod: PBBFAC,HCNC,, | Performed by: FAMILY MEDICINE

## 2018-11-20 PROCEDURE — 73630 X-RAY EXAM OF FOOT: CPT | Mod: 26,HCNC,RT, | Performed by: RADIOLOGY

## 2018-11-20 NOTE — TELEPHONE ENCOUNTER
Pt reported numbness/weakness to right foot to just above ankle for a few days. Can walk but afraid to drive as she can't press the peddle as normal.    Reason for Disposition   Neurologic deficit of gradual onset, ANY of the following: * Weakness of the face, arm, or leg on one side of the body * Numbness of the face, arm, or leg on one side of the body * Loss of speech or garbled speech    Protocols used: ST NEUROLOGIC DEFICIT-A-OH

## 2018-11-20 NOTE — PROGRESS NOTES
Subjective:       Patient ID: Jenniffer Jimenez is a 86 y.o. female.    Chief Complaint: Numbness (right foot / weakness)    HPI 86-year-old white female with atrial fibrillation and lumbar spinal stenosis presents to clinic today secondary to concerns of right foot numbness and tingling with associated weakness since this morning.  She reports that she awoke this morning and has been running around all day running errands.  She has not taken her Lasix yet today.  She denies any pain in the foot or any difficulty walking but does report numbness to the medial foot with radiation into the ankle.  Secondary to this numbness she reports difficulty pressing the accelerator or break of her car.  Review of Systems   Constitutional: Negative for appetite change, chills, fatigue and fever.   HENT: Negative for congestion, ear pain, hearing loss, postnasal drip, rhinorrhea, sinus pressure, sore throat and tinnitus.    Eyes: Negative for redness, itching and visual disturbance.   Respiratory: Negative for cough, chest tightness and shortness of breath.    Cardiovascular: Negative for chest pain and palpitations.   Gastrointestinal: Negative for abdominal pain, constipation, diarrhea, nausea and vomiting.   Genitourinary: Negative for decreased urine volume, difficulty urinating, dysuria, frequency, hematuria and urgency.   Musculoskeletal: Negative for back pain, myalgias, neck pain and neck stiffness.   Skin: Negative for rash.   Neurological: Positive for weakness (right ankle) and numbness (right ankle). Negative for dizziness, light-headedness and headaches.   Psychiatric/Behavioral: Negative.        Objective:      Physical Exam   Constitutional: She is oriented to person, place, and time. She appears well-developed and well-nourished. No distress.   HENT:   Head: Normocephalic and atraumatic.   Right Ear: External ear normal.   Left Ear: External ear normal.   Nose: Nose normal.   Mouth/Throat: Oropharynx is clear  and moist. No oropharyngeal exudate.   Eyes: Conjunctivae and EOM are normal. Pupils are equal, round, and reactive to light. Right eye exhibits no discharge. Left eye exhibits no discharge. No scleral icterus.   Neck: Normal range of motion. Neck supple. No JVD present. No tracheal deviation present. No thyromegaly present.   Cardiovascular: Normal rate, regular rhythm, normal heart sounds and intact distal pulses. Exam reveals no gallop and no friction rub.   No murmur heard.  Pulses:       Dorsalis pedis pulses are 2+ on the right side, and 2+ on the left side.        Posterior tibial pulses are 2+ on the right side, and 2+ on the left side.   Pulmonary/Chest: Effort normal and breath sounds normal. No stridor. No respiratory distress. She has no wheezes. She has no rales.   Abdominal: Soft. Bowel sounds are normal. She exhibits no distension and no mass. There is no tenderness. There is no rebound and no guarding.   Musculoskeletal: Normal range of motion. She exhibits no edema or tenderness.   Lymphadenopathy:     She has no cervical adenopathy.   Neurological: She is alert and oriented to person, place, and time.   Skin: Skin is warm and dry. No rash noted. She is not diaphoretic. No erythema. No pallor.   Psychiatric: She has a normal mood and affect. Her behavior is normal. Judgment and thought content normal.   Nursing note and vitals reviewed.      Assessment:       1. Numbness and tingling of foot        Plan:       1.  Right foot x-ray now.  2.  Recommend taking Lasix once the patient gets home.  3.  Recommend elevation of the lower extremity.  4.  Return to clinic as needed or proceed to the emergency room if symptoms persist or worsen such as numbness or weakness to the leg, dizziness, or slurring of speech.

## 2018-11-21 ENCOUNTER — TELEPHONE (OUTPATIENT)
Dept: INTERNAL MEDICINE | Facility: CLINIC | Age: 83
End: 2018-11-21

## 2018-11-21 NOTE — TELEPHONE ENCOUNTER
----- Message from José Miguel Espinoza MD sent at 11/21/2018  7:26 AM CST -----  Foot x-ray was normal besides signs of arthritis.  Please inform the patient.  Thank you.

## 2018-11-22 RX ORDER — POTASSIUM CHLORIDE 750 MG/1
CAPSULE, EXTENDED RELEASE ORAL
Qty: 90 CAPSULE | Refills: 0 | Status: SHIPPED | OUTPATIENT
Start: 2018-11-22 | End: 2019-01-21 | Stop reason: SDUPTHER

## 2018-11-27 ENCOUNTER — TELEPHONE (OUTPATIENT)
Dept: INTERNAL MEDICINE | Facility: CLINIC | Age: 83
End: 2018-11-27

## 2018-11-27 NOTE — TELEPHONE ENCOUNTER
----- Message from Kimani Mary sent at 11/27/2018 11:53 AM CST -----  Contact: Self [phone]  Pt will like to speak to the doctor or nurse in regards to [employer] Assessment appointment.

## 2018-11-28 ENCOUNTER — TELEPHONE (OUTPATIENT)
Dept: INTERNAL MEDICINE | Facility: CLINIC | Age: 83
End: 2018-11-28

## 2018-11-28 NOTE — PROGRESS NOTES
Subjective:       Patient ID: Jenniffer Jimenez is a 86 y.o. female.    Chief Complaint: Numbness (right leg/bilaterally sometime)    Patient is a 86 y.o.female who presents today for weakness. She thinks she is a diabetic. She is having right foot numbness. Of note, she does have history of lumbar spinal stenosis.      On nov 20th, she was driving and had sudden numbness to her right leg. She did have double vision during this episode as well.     Then, last week, she was sitting at the computer, she then had sudden numbness to both legs. It stops at the calves. The numbness completely resolves a few hours later. She is scared to drive as she is never sure when this occurs.     She is getting some spasms in different parts of her body at times.      Review of Systems   Constitutional: Negative for appetite change, chills, diaphoresis, fatigue and fever.   HENT: Negative for congestion, dental problem, ear discharge, ear pain, hearing loss, postnasal drip, sinus pressure and sore throat.    Eyes: Negative for discharge, redness and itching.   Respiratory: Negative for cough, chest tightness, shortness of breath and wheezing.    Cardiovascular: Negative for chest pain, palpitations and leg swelling.   Gastrointestinal: Negative for abdominal pain, constipation, diarrhea, nausea and vomiting.   Endocrine: Negative for cold intolerance and heat intolerance.   Genitourinary: Negative for difficulty urinating, frequency, hematuria and urgency.   Musculoskeletal: Negative for arthralgias, back pain, gait problem, myalgias and neck pain.   Skin: Negative for color change and rash.   Neurological: Negative for dizziness, syncope and headaches.   Hematological: Negative for adenopathy.   Psychiatric/Behavioral: Negative for behavioral problems and sleep disturbance. The patient is not nervous/anxious.        Objective:      Physical Exam   Constitutional: She is oriented to person, place, and time. She appears  well-developed and well-nourished. No distress.   HENT:   Head: Normocephalic and atraumatic.   Right Ear: External ear normal.   Left Ear: External ear normal.   Nose: Nose normal.   Mouth/Throat: Oropharynx is clear and moist. No oropharyngeal exudate.   Eyes: Conjunctivae and EOM are normal. Pupils are equal, round, and reactive to light. Right eye exhibits no discharge. Left eye exhibits no discharge. No scleral icterus.   Neck: Normal range of motion. Neck supple. No JVD present. No thyromegaly present.   Cardiovascular: Normal rate, regular rhythm, normal heart sounds and intact distal pulses. Exam reveals no gallop and no friction rub.   No murmur heard.  Pulmonary/Chest: Effort normal and breath sounds normal. No stridor. No respiratory distress. She has no wheezes. She has no rales. She exhibits no tenderness.   Abdominal: Soft. Bowel sounds are normal. She exhibits no distension. There is no tenderness. There is no rebound.   Musculoskeletal: Normal range of motion. She exhibits no edema or tenderness.   Lymphadenopathy:     She has no cervical adenopathy.   Neurological: She is alert and oriented to person, place, and time. No cranial nerve deficit.   Skin: Skin is warm and dry. No rash noted. She is not diaphoretic. No erythema.   Psychiatric: She has a normal mood and affect. Her behavior is normal.   Nursing note and vitals reviewed.      Assessment and Plan:       1. Numbness and tingling    - EMG W/ ULTRASOUND AND NERVE CONDUCTION TEST 1 Extremity; Future  - Hemoglobin A1c; Future  - Comprehensive metabolic panel; Future  - Magnesium; Future  - MRI Brain W WO Contrast; Future    2. Muscle spasms of both lower extremities    - EMG W/ ULTRASOUND AND NERVE CONDUCTION TEST 1 Extremity; Future  - Hemoglobin A1c; Future  - Comprehensive metabolic panel; Future  - Magnesium; Future  - MRI Brain W WO Contrast; Future    3. Double vision  - Hemoglobin A1c; Future  - Comprehensive metabolic panel; Future  -  Magnesium; Future  - MRI Brain W WO Contrast; Future    4. Diplopia  - Hemoglobin A1c; Future  - Comprehensive metabolic panel; Future  - Magnesium; Future  - MRI Brain W WO Contrast; Future    5. Prediabetes  - Hemoglobin A1c; Future    Notify clinic if symptoms change, worsen or do not improve          No Follow-up on file.

## 2018-11-28 NOTE — TELEPHONE ENCOUNTER
Spoke to pt and informed that at this time there is no sooner available apt. Advised that labs and other test to be ordered  Will be discussed during o/v

## 2018-11-28 NOTE — TELEPHONE ENCOUNTER
----- Message from Olimpia [facility] sent at 11/28/2018 11:49 AM CST -----  Contact: self/ [phone]  Patient think she is a diabetic? Patient feel light headed . Right foot numbness. Patient was seen 11/20/18 and believe she was diagnosed wrong. Patient has an appointment scheduled for 12/3/18. Please call and advise.

## 2018-12-03 ENCOUNTER — OFFICE VISIT (OUTPATIENT)
Dept: INTERNAL MEDICINE | Facility: CLINIC | Age: 83
End: 2018-12-03
Payer: MEDICARE

## 2018-12-03 ENCOUNTER — PATIENT MESSAGE (OUTPATIENT)
Dept: INTERNAL MEDICINE | Facility: CLINIC | Age: 83
End: 2018-12-03

## 2018-12-03 ENCOUNTER — LAB VISIT (OUTPATIENT)
Dept: LAB | Facility: HOSPITAL | Age: 83
End: 2018-12-03
Attending: INTERNAL MEDICINE
Payer: MEDICARE

## 2018-12-03 ENCOUNTER — TELEPHONE (OUTPATIENT)
Dept: NEUROLOGY | Facility: CLINIC | Age: 83
End: 2018-12-03

## 2018-12-03 VITALS
OXYGEN SATURATION: 95 % | HEART RATE: 71 BPM | TEMPERATURE: 99 F | RESPIRATION RATE: 18 BRPM | DIASTOLIC BLOOD PRESSURE: 60 MMHG | SYSTOLIC BLOOD PRESSURE: 132 MMHG | BODY MASS INDEX: 29 KG/M2 | WEIGHT: 179.69 LBS

## 2018-12-03 DIAGNOSIS — R20.2 NUMBNESS AND TINGLING: ICD-10-CM

## 2018-12-03 DIAGNOSIS — R73.03 PREDIABETES: ICD-10-CM

## 2018-12-03 DIAGNOSIS — R20.0 NUMBNESS AND TINGLING: Primary | ICD-10-CM

## 2018-12-03 DIAGNOSIS — H53.2 DOUBLE VISION: ICD-10-CM

## 2018-12-03 DIAGNOSIS — R20.0 NUMBNESS AND TINGLING: ICD-10-CM

## 2018-12-03 DIAGNOSIS — R20.2 NUMBNESS AND TINGLING: Primary | ICD-10-CM

## 2018-12-03 DIAGNOSIS — M62.838 MUSCLE SPASMS OF BOTH LOWER EXTREMITIES: ICD-10-CM

## 2018-12-03 DIAGNOSIS — H53.2 DIPLOPIA: ICD-10-CM

## 2018-12-03 LAB
ALBUMIN SERPL BCP-MCNC: 3.9 G/DL
ALP SERPL-CCNC: 127 U/L
ALT SERPL W/O P-5'-P-CCNC: 14 U/L
ANION GAP SERPL CALC-SCNC: 9 MMOL/L
AST SERPL-CCNC: 19 U/L
BILIRUB SERPL-MCNC: 0.7 MG/DL
BUN SERPL-MCNC: 27 MG/DL
CALCIUM SERPL-MCNC: 9.6 MG/DL
CHLORIDE SERPL-SCNC: 102 MMOL/L
CO2 SERPL-SCNC: 27 MMOL/L
CREAT SERPL-MCNC: 0.9 MG/DL
EST. GFR  (AFRICAN AMERICAN): >60 ML/MIN/1.73 M^2
EST. GFR  (NON AFRICAN AMERICAN): 58.1 ML/MIN/1.73 M^2
ESTIMATED AVG GLUCOSE: 123 MG/DL
GLUCOSE SERPL-MCNC: 109 MG/DL
HBA1C MFR BLD HPLC: 5.9 %
MAGNESIUM SERPL-MCNC: 2.2 MG/DL
POTASSIUM SERPL-SCNC: 4.2 MMOL/L
PROT SERPL-MCNC: 7.3 G/DL
SODIUM SERPL-SCNC: 138 MMOL/L

## 2018-12-03 PROCEDURE — 99999 PR PBB SHADOW E&M-EST. PATIENT-LVL III: CPT | Mod: PBBFAC,HCNC,, | Performed by: INTERNAL MEDICINE

## 2018-12-03 PROCEDURE — 99214 OFFICE O/P EST MOD 30 MIN: CPT | Mod: HCNC,S$GLB,, | Performed by: INTERNAL MEDICINE

## 2018-12-03 PROCEDURE — 83036 HEMOGLOBIN GLYCOSYLATED A1C: CPT | Mod: HCNC

## 2018-12-03 PROCEDURE — 1101F PT FALLS ASSESS-DOCD LE1/YR: CPT | Mod: CPTII,HCNC,S$GLB, | Performed by: INTERNAL MEDICINE

## 2018-12-03 PROCEDURE — 36415 COLL VENOUS BLD VENIPUNCTURE: CPT | Mod: HCNC,PO

## 2018-12-03 PROCEDURE — 83735 ASSAY OF MAGNESIUM: CPT | Mod: HCNC

## 2018-12-03 PROCEDURE — 80053 COMPREHEN METABOLIC PANEL: CPT | Mod: HCNC

## 2018-12-03 RX ORDER — FLUTICASONE PROPIONATE 50 MCG
1 SPRAY, SUSPENSION (ML) NASAL DAILY
Qty: 3 BOTTLE | Refills: 0 | Status: SHIPPED | OUTPATIENT
Start: 2018-12-03 | End: 2019-05-20 | Stop reason: SDUPTHER

## 2018-12-03 RX ORDER — PRAVASTATIN SODIUM 40 MG/1
40 TABLET ORAL 2 TIMES DAILY
Qty: 180 TABLET | Refills: 3 | Status: SHIPPED | OUTPATIENT
Start: 2018-12-03 | End: 2020-08-18 | Stop reason: SDUPTHER

## 2018-12-03 NOTE — TELEPHONE ENCOUNTER
----- Message from Clara Del Castillo sent at 12/3/2018  7:43 AM CST -----  Contact: Pt [phone]  Pt would like call back from the nurse to see if she can be fit in on 2/15 for an EMG so that was it can be the same day as her appt with dr ramsay

## 2018-12-04 ENCOUNTER — PATIENT MESSAGE (OUTPATIENT)
Dept: INTERNAL MEDICINE | Facility: CLINIC | Age: 83
End: 2018-12-04

## 2018-12-10 ENCOUNTER — PATIENT MESSAGE (OUTPATIENT)
Dept: INTERNAL MEDICINE | Facility: CLINIC | Age: 83
End: 2018-12-10

## 2018-12-13 ENCOUNTER — PATIENT MESSAGE (OUTPATIENT)
Dept: INTERNAL MEDICINE | Facility: CLINIC | Age: 83
End: 2018-12-13

## 2018-12-13 ENCOUNTER — HOSPITAL ENCOUNTER (OUTPATIENT)
Dept: RADIOLOGY | Facility: HOSPITAL | Age: 83
Discharge: HOME | End: 2018-12-13
Attending: INTERNAL MEDICINE
Payer: MEDICARE

## 2018-12-13 DIAGNOSIS — H53.2 DOUBLE VISION: ICD-10-CM

## 2018-12-13 DIAGNOSIS — R20.0 NUMBNESS AND TINGLING: ICD-10-CM

## 2018-12-13 DIAGNOSIS — R20.2 NUMBNESS AND TINGLING: ICD-10-CM

## 2018-12-13 DIAGNOSIS — M62.838 MUSCLE SPASMS OF BOTH LOWER EXTREMITIES: ICD-10-CM

## 2018-12-13 DIAGNOSIS — H53.2 DIPLOPIA: ICD-10-CM

## 2018-12-13 PROCEDURE — 70553 MRI BRAIN STEM W/O & W/DYE: CPT | Mod: TC,HCNC

## 2018-12-13 PROCEDURE — 70553 MRI BRAIN STEM W/O & W/DYE: CPT | Mod: 26,HCNC,, | Performed by: RADIOLOGY

## 2018-12-13 PROCEDURE — A9585 GADOBUTROL INJECTION: HCPCS | Mod: HCNC | Performed by: INTERNAL MEDICINE

## 2018-12-13 PROCEDURE — 25500020 PHARM REV CODE 255: Mod: HCNC | Performed by: INTERNAL MEDICINE

## 2018-12-13 RX ORDER — GADOBUTROL 604.72 MG/ML
8 INJECTION INTRAVENOUS
Status: COMPLETED | OUTPATIENT
Start: 2018-12-13 | End: 2018-12-13

## 2018-12-13 RX ADMIN — GADOBUTROL 8 ML: 604.72 INJECTION INTRAVENOUS at 10:12

## 2018-12-14 ENCOUNTER — ANTI-COAG VISIT (OUTPATIENT)
Dept: CARDIOLOGY | Facility: CLINIC | Age: 83
End: 2018-12-14
Payer: MEDICARE

## 2018-12-14 ENCOUNTER — CLINICAL SUPPORT (OUTPATIENT)
Dept: INTERNAL MEDICINE | Facility: CLINIC | Age: 83
End: 2018-12-14
Payer: MEDICARE

## 2018-12-14 DIAGNOSIS — I48.20 CHRONIC ATRIAL FIBRILLATION: ICD-10-CM

## 2018-12-14 DIAGNOSIS — Z79.01 CURRENT USE OF LONG TERM ANTICOAGULATION: Primary | ICD-10-CM

## 2018-12-14 DIAGNOSIS — J31.0 CHRONIC RHINITIS: ICD-10-CM

## 2018-12-14 LAB — INR PPP: 2.1 (ref 2–3)

## 2018-12-14 PROCEDURE — 85610 PROTHROMBIN TIME: CPT | Mod: QW,S$GLB,,

## 2018-12-14 PROCEDURE — 95115 IMMUNOTHERAPY ONE INJECTION: CPT | Mod: S$GLB,,, | Performed by: FAMILY MEDICINE

## 2018-12-14 PROCEDURE — 99499 UNLISTED E&M SERVICE: CPT | Mod: S$GLB,,, | Performed by: ALLERGY & IMMUNOLOGY

## 2018-12-14 PROCEDURE — 99211 OFF/OP EST MAY X REQ PHY/QHP: CPT | Mod: 25,S$GLB,,

## 2018-12-14 NOTE — PROGRESS NOTES
INR within goal range today. Patient with bruises on arms and hands from use. Denies any bleeding or other changes. Patient is stable on this dose. She will continue this dose until follow-up in 5 weeks. I advised her to contact us with any changes or problems.

## 2019-01-14 NOTE — PROGRESS NOTES
Patient called to reschedule 1/18/19 coumadin clinic appointment to 1/21/19 due to transportation problem

## 2019-01-21 ENCOUNTER — OFFICE VISIT (OUTPATIENT)
Dept: INTERNAL MEDICINE | Facility: CLINIC | Age: 84
End: 2019-01-21
Payer: MEDICARE

## 2019-01-21 ENCOUNTER — ANTI-COAG VISIT (OUTPATIENT)
Dept: CARDIOLOGY | Facility: CLINIC | Age: 84
End: 2019-01-21
Payer: MEDICARE

## 2019-01-21 ENCOUNTER — CLINICAL SUPPORT (OUTPATIENT)
Dept: INTERNAL MEDICINE | Facility: CLINIC | Age: 84
End: 2019-01-21
Payer: MEDICARE

## 2019-01-21 VITALS
WEIGHT: 181 LBS | RESPIRATION RATE: 15 BRPM | DIASTOLIC BLOOD PRESSURE: 50 MMHG | BODY MASS INDEX: 29.09 KG/M2 | HEART RATE: 79 BPM | SYSTOLIC BLOOD PRESSURE: 110 MMHG | HEIGHT: 66 IN

## 2019-01-21 DIAGNOSIS — R73.03 PREDIABETES: ICD-10-CM

## 2019-01-21 DIAGNOSIS — M19.011 PRIMARY OSTEOARTHRITIS OF RIGHT SHOULDER: ICD-10-CM

## 2019-01-21 DIAGNOSIS — H52.01 HYPERMETROPIA OF RIGHT EYE: ICD-10-CM

## 2019-01-21 DIAGNOSIS — Z91.81 RISK FOR FALLS: ICD-10-CM

## 2019-01-21 DIAGNOSIS — H81.09 MENIERE'S DISEASE, UNSPECIFIED LATERALITY: ICD-10-CM

## 2019-01-21 DIAGNOSIS — H43.813 POSTERIOR VITREOUS DETACHMENT, BILATERAL: ICD-10-CM

## 2019-01-21 DIAGNOSIS — J31.0 CHRONIC RHINITIS: ICD-10-CM

## 2019-01-21 DIAGNOSIS — H52.7 REFRACTIVE ERROR: ICD-10-CM

## 2019-01-21 DIAGNOSIS — Z79.01 CURRENT USE OF LONG TERM ANTICOAGULATION: ICD-10-CM

## 2019-01-21 DIAGNOSIS — I70.0 ATHEROSCLEROSIS OF AORTA: ICD-10-CM

## 2019-01-21 DIAGNOSIS — H34.8122 STABLE CENTRAL RETINAL VEIN OCCLUSION OF LEFT EYE: ICD-10-CM

## 2019-01-21 DIAGNOSIS — M81.6 LOCALIZED OSTEOPOROSIS WITHOUT CURRENT PATHOLOGICAL FRACTURE: ICD-10-CM

## 2019-01-21 DIAGNOSIS — Z96.1 PSEUDOPHAKIA, BOTH EYES: ICD-10-CM

## 2019-01-21 DIAGNOSIS — I51.89 LEFT VENTRICULAR DIASTOLIC DYSFUNCTION WITH PRESERVED SYSTOLIC FUNCTION: ICD-10-CM

## 2019-01-21 DIAGNOSIS — Z86.73 HISTORY OF TIA (TRANSIENT ISCHEMIC ATTACK): ICD-10-CM

## 2019-01-21 DIAGNOSIS — E78.00 PURE HYPERCHOLESTEROLEMIA: ICD-10-CM

## 2019-01-21 DIAGNOSIS — I48.20 CHRONIC ATRIAL FIBRILLATION: ICD-10-CM

## 2019-01-21 DIAGNOSIS — Z98.890 HISTORY OF STRABISMUS SURGERY: ICD-10-CM

## 2019-01-21 DIAGNOSIS — H50.10 EXOTROPIA OF BOTH EYES: ICD-10-CM

## 2019-01-21 DIAGNOSIS — N18.30 CHRONIC KIDNEY DISEASE, STAGE III (MODERATE): ICD-10-CM

## 2019-01-21 DIAGNOSIS — Z00.00 ENCOUNTER FOR PREVENTIVE HEALTH EXAMINATION: Primary | ICD-10-CM

## 2019-01-21 DIAGNOSIS — M19.90 ARTHRITIS: ICD-10-CM

## 2019-01-21 DIAGNOSIS — H90.3 SENSORINEURAL HEARING LOSS (SNHL) OF BOTH EARS: ICD-10-CM

## 2019-01-21 DIAGNOSIS — E66.3 OVERWEIGHT (BMI 25.0-29.9): ICD-10-CM

## 2019-01-21 DIAGNOSIS — I50.32 CHRONIC DIASTOLIC HEART FAILURE: ICD-10-CM

## 2019-01-21 DIAGNOSIS — H04.129 DRY EYE SYNDROME, UNSPECIFIED LATERALITY: ICD-10-CM

## 2019-01-21 DIAGNOSIS — M47.817 FACET ARTHROPATHY, LUMBOSACRAL: ICD-10-CM

## 2019-01-21 DIAGNOSIS — R22.2 MASS ON BACK: ICD-10-CM

## 2019-01-21 DIAGNOSIS — R26.81 GAIT INSTABILITY: ICD-10-CM

## 2019-01-21 DIAGNOSIS — M48.061 SPINAL STENOSIS OF LUMBAR REGION, UNSPECIFIED WHETHER NEUROGENIC CLAUDICATION PRESENT: ICD-10-CM

## 2019-01-21 DIAGNOSIS — R26.89 BALANCE PROBLEMS: ICD-10-CM

## 2019-01-21 DIAGNOSIS — Z79.01 CURRENT USE OF LONG TERM ANTICOAGULATION: Primary | ICD-10-CM

## 2019-01-21 PROBLEM — H91.93 BILATERAL HEARING LOSS: Status: RESOLVED | Noted: 2017-11-10 | Resolved: 2019-01-21

## 2019-01-21 PROBLEM — H00.12 CHALAZION OF RIGHT LOWER EYELID: Status: RESOLVED | Noted: 2018-09-24 | Resolved: 2019-01-21

## 2019-01-21 LAB — INR PPP: 1.8 (ref 2–3)

## 2019-01-21 PROCEDURE — 99999 PR PBB SHADOW E&M-EST. PATIENT-LVL V: ICD-10-PCS | Mod: PBBFAC,HCNC,, | Performed by: NURSE PRACTITIONER

## 2019-01-21 PROCEDURE — 85610 PROTHROMBIN TIME: CPT | Mod: QW,HCNC,S$GLB,

## 2019-01-21 PROCEDURE — 99499 NO LOS: ICD-10-PCS | Mod: HCNC,S$GLB,, | Performed by: ALLERGY & IMMUNOLOGY

## 2019-01-21 PROCEDURE — G0439 PPPS, SUBSEQ VISIT: HCPCS | Mod: HCNC,S$GLB,, | Performed by: NURSE PRACTITIONER

## 2019-01-21 PROCEDURE — 95115 IMMUNOTHERAPY ONE INJECTION: CPT | Mod: HCNC,S$GLB,, | Performed by: FAMILY MEDICINE

## 2019-01-21 PROCEDURE — 85610 POCT INR: ICD-10-PCS | Mod: QW,HCNC,S$GLB,

## 2019-01-21 PROCEDURE — G0439 PR MEDICARE ANNUAL WELLNESS SUBSEQUENT VISIT: ICD-10-PCS | Mod: HCNC,S$GLB,, | Performed by: NURSE PRACTITIONER

## 2019-01-21 PROCEDURE — 99211 OFF/OP EST MAY X REQ PHY/QHP: CPT | Mod: 25,HCNC,S$GLB,

## 2019-01-21 PROCEDURE — 99499 UNLISTED E&M SERVICE: CPT | Mod: HCNC,S$GLB,, | Performed by: ALLERGY & IMMUNOLOGY

## 2019-01-21 PROCEDURE — 99499 RISK ADDL DX/OHS AUDIT: ICD-10-PCS | Mod: HCNC,S$GLB,, | Performed by: NURSE PRACTITIONER

## 2019-01-21 PROCEDURE — 99999 PR PBB SHADOW E&M-EST. PATIENT-LVL I: CPT | Mod: PBBFAC,HCNC,,

## 2019-01-21 PROCEDURE — 99211 PR OFFICE/OUTPT VISIT, EST, LEVL I: ICD-10-PCS | Mod: 25,HCNC,S$GLB,

## 2019-01-21 PROCEDURE — 99499 UNLISTED E&M SERVICE: CPT | Mod: HCNC,S$GLB,, | Performed by: NURSE PRACTITIONER

## 2019-01-21 PROCEDURE — 95115 PR IMMUNOTHERAPY, ONE INJECTION: ICD-10-PCS | Mod: HCNC,S$GLB,, | Performed by: FAMILY MEDICINE

## 2019-01-21 PROCEDURE — 99999 PR PBB SHADOW E&M-EST. PATIENT-LVL I: ICD-10-PCS | Mod: PBBFAC,HCNC,,

## 2019-01-21 PROCEDURE — 99999 PR PBB SHADOW E&M-EST. PATIENT-LVL V: CPT | Mod: PBBFAC,HCNC,, | Performed by: NURSE PRACTITIONER

## 2019-01-21 RX ORDER — POTASSIUM CHLORIDE 750 MG/1
CAPSULE, EXTENDED RELEASE ORAL
Qty: 90 CAPSULE | Refills: 3 | Status: SHIPPED | OUTPATIENT
Start: 2019-01-21 | End: 2020-03-20

## 2019-01-21 RX ORDER — FUROSEMIDE 20 MG/1
TABLET ORAL
Qty: 90 TABLET | Refills: 0 | Status: SHIPPED | OUTPATIENT
Start: 2019-01-21 | End: 2019-02-15 | Stop reason: SDUPTHER

## 2019-01-21 RX ORDER — WARFARIN 3 MG/1
4.5-6 TABLET ORAL DAILY
Qty: 60 TABLET | Refills: 5 | Status: SHIPPED | OUTPATIENT
Start: 2019-01-21 | End: 2019-01-25 | Stop reason: SDUPTHER

## 2019-01-21 RX ORDER — POTASSIUM CHLORIDE 750 MG/1
CAPSULE, EXTENDED RELEASE ORAL
Qty: 90 CAPSULE | Refills: 0 | Status: SHIPPED | OUTPATIENT
Start: 2019-01-21 | End: 2019-02-15 | Stop reason: SDUPTHER

## 2019-01-21 NOTE — PROGRESS NOTES
INR below goal range today. Patient reports increase to vit K foods which she thinks she will continue. She has bruising from use but denies any bleeding or any other changes that might account for low level. Will increase weekly dose now since INR is low and last reading was at low end of goal range. Would like to recheck INR in 2 weeks but patient has transportation issues. Will scheduled lab with other appt/labs in 3 weeks. Patient advised to call with any other problems or changes.

## 2019-01-21 NOTE — PROGRESS NOTES
"Jenniffer Jimenez presented for a  Medicare AWV and comprehensive Health Risk Assessment today. The following components were reviewed and updated:    · Medical history  · Family History  · Social history  · Allergies and Current Medications  · Health Risk Assessment  · Health Maintenance  · Care Team     ** See Completed Assessments for Annual Wellness Visit within the encounter summary.**    Patient agrees to see Kalia MENDEZ today         The following assessments were completed:  · Living Situation  · CAGE  · Depression Screening  · Timed Get Up and Go  · Whisper Test  · Cognitive Function Screening  · Nutrition Screening  · ADL Screening  · PAQ Screening    Vitals:    01/21/19 0848   BP: (!) 110/50   Pulse: 79   Resp: 15   Weight: 82.1 kg (181 lb)   Height: 5' 6" (1.676 m)     Body mass index is 29.21 kg/m².  Physical Exam   Constitutional: She is oriented to person, place, and time. She appears well-developed and well-nourished.   HENT:   Head: Normocephalic and atraumatic.   Right Ear: External ear normal.   Left Ear: External ear normal.   Eyes: No scleral icterus.   Cardiovascular: Normal rate, regular rhythm and normal heart sounds.   No murmur heard.  Pulmonary/Chest: Effort normal and breath sounds normal. No respiratory distress. She has no wheezes. She has no rales.   Abdominal: Soft. Bowel sounds are normal. She exhibits no distension. There is no tenderness.   Musculoskeletal: She exhibits edema. She exhibits no tenderness or deformity.   Neurological: She is alert and oriented to person, place, and time. No cranial nerve deficit.   Skin: Skin is warm and dry.   Psychiatric: She has a normal mood and affect. Her behavior is normal. Judgment and thought content normal.   Nursing note and vitals reviewed.        Diagnoses and health risks identified today and associated recommendations/orders:    1. Pure hypercholesterolemia  Stable- followed by cardiology, PCP    2. Pseudophakia, both eyes  Stable- " followed by opthalmolgy    3. Stable central retinal vein occlusion of left eye  Stable- followed by opthalmolgy    4. Chronic rhinitis  Stable- followed by PCP    5. Sensorineural hearing loss (SNHL) of both ears  Stable- followed by PCP  - Ambulatory referral to Outpatient Case Management    6. Exotropia of both eyes  Stable- followed by opthalmolgy    7. Meniere's disease, unspecified laterality  Stable- followed by opthalmology    8. Facet arthropathy, lumbosacral  Stable- followed by PCP    9. Spinal stenosis of lumbar region, unspecified whether neurogenic claudication present  Stable- followed by PCP    10. Dry eye syndrome, unspecified laterality  Stable- followed by opthalmolgy    11. Posterior vitreous detachment, bilateral  Stable- followed by opthalmolgy    12. Refractive error  Stable- followed by opthalmolgy    13. Prediabetes  Stable- followed by PCP    14. Current use of long term anticoagulation  Stable- followed by coumadin cloinic    15. History of strabismus surgery  Stable- followed by opthalmolgy    16. Primary osteoarthritis of right shoulder  Stable- followed by PCP    17. Localized osteoporosis without current pathological fracture  Stable- followed by PCP-last DXA 7/20/18    18. History of TIA (transient ischemic attack)  Stable- followed by PCP    19. 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 Atherosclerosis of aorta  Stable- followed by cardiology, PCP    21. Chronic kidney disease, stage III (moderate)  Stable- followed by PCP    22. Chronic atrial fibrillation  Stable- followed by cardiology    23. Encounter for preventive health examination  Assessments completed  Preventative health recommendations reviewed       24. Overweight (BMI 25.0-29.9)  Chronic  Followed by PCP.  Reviewed with patient the Centers for Disease Control and Prevention (CDC)  weight recommendations for current BMI & ideal BMI range.   Low fat  diet and regular exercise regimen encouraged.  [facility] handout with recommended goal weight range given to patient.     25. Hypermetropia of right eye  Stable- followed by opthalmolgy    26. Chronic diastolic heart failure  Stable- followed by cardiology    27. Mass on back  Stable- followed by PCP    28. Arthritis  Stable- followed by PCP    29. Balance problems  Stable- followed by PCP    30. Gait instability  Stable- followed by PCP    31. Risk for falls  Stable- followed by PCP      Provided Jenniffer with a 5-10 year written screening schedule and personal prevention plan. Recommendations were developed using the USPSTF age appropriate recommendations. Education, counseling, and referrals were provided as needed. After Visit Summary printed and given to patient which includes a list of additional screenings\tests needed. Case mgt referral: cost of hearing aides, senior resource guide given w hearing resources in the community under topic. Fall prevention handouts given. The [employer] transportation application printed off internet & given to pt to complete as per pt request. Patient will submit completed application to [employer] when complete. Verbalizes understanding of process.       Follow-up in about 1 year (around 1/21/2020) for hra.    Kaila Aguilar NP

## 2019-01-21 NOTE — Clinical Note
Primary Care Providers:Rubi Young, DO, DO (General)Your patient was seen today for a HRA visit. No Gap(s) in care (HEDIS gaps) have been identified during this visit that require additional testing and possible follow up.Orders Placed This Encounter    Ambulatory referral to Outpatient Case Management        Referral Priority:Routine        Referral Type:Consultation        Referral Reason:Specialty Services Required        Number of Visits Requested:1These orders were placed using [facility] approved protocol and any results will be forwarded to your office for appropriate follow up. I have included a copy of my visit note; please review the note and feel free to contact me with any questions. Thank you for allowing me to participate in the care of your patients.Kaila Aguilar NP

## 2019-01-21 NOTE — PROGRESS NOTES
I offered to discuss end of life issues, including information on how to make advance directives that the patient could use to name someone who would make medical decisions on their behalf if they became too ill to make themselves.    ___Patient declined  _X_Patient HAS LIVING WILL

## 2019-01-21 NOTE — PATIENT INSTRUCTIONS
Counseling and Referral of Other Preventative  (Italic type indicates deductible and co-insurance are waived)    Patient Name: Jenniffer Jimenez  Today's Date: 1/21/2019    Health Maintenance       Date Due Completion Date    Lipid Panel 07/20/2019 7/20/2018    TETANUS VACCINE 03/11/2028   3/11/2018    MAMMOGRAM                                                7/20/19    BONE DENSITY                                              7/20/2020        Orders Placed This Encounter   Procedures    Ambulatory referral to Outpatient Case Management     The following information is provided to all patients.  This information is to help you find resources for any of the problems found today that may be affecting your health:                Living healthy guide: www.[facility].[place].[facility]      Understanding Diabetes: www.diabetes.org      Eating healthy: www.cdc.gov/healthyweight      CDC home safety checklist: www.cdc.gov/steadi/patient.html      Agency on Aging: www.goea.[place].[facility]      Alcoholics anonymous (AA): www.aa.org      Physical Activity: www.nichelle.nih.gov/gb3ryqc      Tobacco use: www.quitwithusla.org     Exercises to Prevent Falls  Certain types of exercises may help make you less likely to fall. Try the ones below. Or do other exercises that your health care provider suggests. Depending on your health, you may need to start slowly. Don't let that stop you. Even small amounts of exercise can help you. Be sure to talk to your health care provider before starting any exercise program.       Improve balance  Many types of exercise can help improve balance. Ronaldo chi and yoga are good examples. Here's another one to try. You can do it anytime and almost anywhere.  · Stand next to a counter or solid support.  · Push yourself up onto your tiptoes.  · Hold for 5 seconds. If you start to lose your balance, hold on to the counter.  · Rest and repeat 5 times. Work up to holding for 20 to 30 seconds, if you can. Increase  "flexibility  Being more flexible makes it easier for you to move around safely. Try exercises like the seated hamstring stretch.  · Sit in a chair and put one foot on a stool.  · Straighten your leg and reach with both hands down either side of your leg. Reach as far down your leg as you can.  · Hold for about 20 seconds.  · Go back to the starting position. Then repeat 5 times. Switch legs. Build strength  "Resistance" exercises help build strength. You can do them without equipment. Or you can use weights, elastic bands, or special machines. One such exercise is called the biceps curl. You can hold a 1-pound weight or even a can of soup. Do this exercise at least 3 times a week. Strive for every day.  · Sit up straight in a chair.  · Keep your elbow close to your body and your wrist straight.  · Bend your arm, moving your hand up to your shoulder. Then slowly lower your arm.  · Repeat 5 times. Switch to the other arm.   Build your staying power  Aerobic exercises make your heart and lungs stronger so you can keep moving longer. Walking and swimming are two of the best types of exercises you can do. Using a stationary bike is great, too. Find an aerobic exercise that you enjoy. Start slowly and build up. Even 5 minutes is helpful. Aim for a goal of 30 minutes, at least 3 times a week. You don't have to do 30 minutes in 1 session. Break it up and walk a little throughout the day.  More helpful tips  · Start easy. Slowly work up to doing more.  · Talk with your health care provider about the best exercises for you.  · Call senior centers or health clubs about exercise programs.  · If needed, have a family member watch you walk every so often to check your stability.  · Exercise with a friend. Choose an activity you both enjoy.  · Consider greg chi or yoga to strengthen your balance.  · Try exercises that you can do anytime, anywhere. Here are 2 examples. Have someone with you when you first try these:  ¨ Practice " walking by placing 1 foot right in front of the other.  ¨ Stand up and sit down 10 times. Repeat this throughout the day.   Date Last Reviewed: 6/13/2015  © 6252-0909 [employer] [address] [place] [address], [place] [address]. All rights reserved. This information is not intended as a substitute for professional medical care. Always follow your healthcare professional's instructions.        Preventing Falls: Making Changes in Your Living Space  Is your living space filled with hazards that could cause you to fall? Changes can make you safer. They could even save your life. Take a careful look around your home. Change what you can on your own. Hire someone or ask friends or family to help with harder tasks.      Be sure to add a nonslip mat to the inside of your shower or bathtub. Always keep a nightlight on. Keep a clear path from your bed to the bathroom. Move items from higher shelves to lower ones.   Remove hazards  · Remove things that can trip you, like throw rugs, boxes, piles of paper, or cords.  · Nail down rugs or carpeting if you don't want to remove them.  · Don't store items on stairs.  · Keep walkways clear.  · Clean up spills right away.  · Replace glass tables with wooden ones. They're safer if you fall.  Add safety devices  · Add handrails to both sides of stairs.  · Buy a raised toilet seat.  · Add grab bars near the toilet and in the shower.  · Get grabbers to help you reach things and avoid climbing.  Improve lighting  · Add nightlights to halls, bedrooms, and bathrooms.   · Put light switches at the top and bottom of stairs.  · Be sure each room and flight of stairs has proper lighting.  · Use shades or curtains to cut glare from windows.  · Put flashlights in each room. Replace burned-out bulbs.  · Get glowing light switches for room entrances.  Take other precautions  · Use nonskid floor wax.  · Buy a nonslip mat and a liquid soap dispenser for the shower.  · Tack down carpets or use  slip-resistant backing.  · Put most-used items within easy reach.  · Add bright paint or tape on the top front edge of steps.  · Save big jobs, such as moving furniture or other heavy objects, for family or friends.  · Get professional help installing grab bars. They can be unsafe if not installed the right way.  Fix riskier rooms first  Don't tackle everything at once. Focus on one room at a time. The bathroom is a common spot for falls, so you may start there. Or start with a room you spend lots of time in, such as your bedroom. Make only a few changes at once. This will give you time to adjust to them.     Outside your home  You might arrange for these changes yourself, or you might need to talk to your  or homeowners' association about them.  · Have loose boards on porches or damaged stairs repaired.  · Have rough edges, holes, or large cracks in sidewalks or driveways repaired.  · Have hazards that could trip you, such as hoses or cassandra, removed.  · Use high-wattage light bulbs (100 or greater) near outside doors and stairs.  · Get handrails added to outside stairs. Have them extend beyond the bottom step.  · Get help in winter weather with ice or snow removal.   Date Last Reviewed: 6/12/2015  © 2596-2546 [employer]. [address] [place] [address]. All rights reserved. This information is not intended as a substitute for professional medical care. Always follow your healthcare professional's instructions.        Preventing Falls: Moving Safely Using a Cane or Walker     When using a cane, keep it away from your feet so you dont trip.     A walking aid, such as a cane or walker, can help you stay more independent and avoid falls. Remember to keep your walking aid within easy reach when you're in a chair or in bed. And learn how to use it safely so you don't injure yourself. Be sure the cane or walker is the correct height. Hang your arm loosely at your side, and measure  the distance from your wrist to the floor. The distance should be the same as the height of your cane or walker.  Using a cane  If you have a stronger side, hold the cane on the side of your stronger leg.  1. Get your balance.  2. Move the cane and your weaker leg forward.  3. Support your weight on both the cane and your weaker side.  4. Step with your stronger leg.  5. Start again from step 1.  Using a walker  1. Roll the walker (or lift it, if you're using one without wheels) forward about 12 inches.  2. Step forward with your weaker leg first.  3. Use the walker to help keep your balance.  4. Bring your other foot forward to the center of the walker.  5. Start again from step 1.  Helpful tips  · Check with your health care provider about the right walking aid to use. Ask about a walker with a seat attached.  · Check the tips of your cane or walker to make sure they have nonskid covers.  · Move slowly from room to room. Don't rush.  · Sit down to get dressed.  · Use a oma pack or backpack to keep your hands free.  · Get help for jobs that mean climbing, even on a stepstool.  · Do not try going up or down stairs using a walker.   Date Last Reviewed: 6/12/2015  © 3438-1680 [employer]. [address] [place] [address], [place] [address]. All rights reserved. This information is not intended as a substitute for professional medical care. Always follow your healthcare professional's instructions.

## 2019-01-22 ENCOUNTER — OUTPATIENT CASE MANAGEMENT (OUTPATIENT)
Dept: ADMINISTRATIVE | Facility: OTHER | Age: 84
End: 2019-01-22

## 2019-01-22 NOTE — PROGRESS NOTES
Thank you for the referral.  Patient has been assigned to Juliann Naranjo LMSW for low risk screening for Outpatient Case Management.     Reason for referral: Sensorineural hearing loss (SNHL) of both ears    Please contact [facility] at ext. [phone] with any questions.    Thank you,    Heather Chowdary, [facility].  [phone]

## 2019-01-23 ENCOUNTER — OUTPATIENT CASE MANAGEMENT (OUTPATIENT)
Dept: ADMINISTRATIVE | Facility: OTHER | Age: 84
End: 2019-01-23

## 2019-01-24 ENCOUNTER — OUTPATIENT CASE MANAGEMENT (OUTPATIENT)
Dept: ADMINISTRATIVE | Facility: OTHER | Age: 84
End: 2019-01-24

## 2019-01-24 NOTE — LETTER
January 24, 2019    Jenniffer Jimenez  [address] [place] [id number]             [facility] [address] [place] [address] Dear Ms. Jimenez:    I am writing from the Outpatient Complex Care Management Department at [facility]. I received a referral from Kaila Aguilar NP to contact you or your caregiver regarding any needs you may have. I have attempted to contact you or your cargeiver by phone two times unsuccessfully. Please contact the Outpatient Complex Care Management Department at [phone] if you would like to discuss your needs.      Sincerely,     Juliann Naranjo, [facility] [place] [facility]

## 2019-01-24 NOTE — PROGRESS NOTES
This LMSW received a referral on the above patient.   Reason for referral: Low risk, Sensorineural hearing loss (SNHL) of both ears  Name of the community resource that was provided: [place] [facility]  Resource given to: Patient via US mail     LMSW received return phone call from patient this afternoon. LMSW completed assessment with patient. Patient reports living alone and reports she has a cat. Patient reports being independent with ADLs. Patient uses walker to ambulate long distances. Patient reports nearest relative is a granddaughter however she reports granddaughter works two jobs and not available to assist her. Patient states she has a brother in [place]. Patient reports having close friends and reports she goes to [place] every day. Patient denies having difficulty affording housing, food, or medication cost. Patient confirms discussing need for hearing aids with Kaila Aguilar NP. Patient reports having complicated history with her hearing and reports being told several years ago by a provider not to get hearing aids because they would cause complications and not be effective. Patient reports sharing information with Kaila Aguilar. Patient reports she does have the money to even try hearing aids and reports prices for hearing aid exceed her limit. LMSW informed patient about Hearing Aid Program by the [facility] and offered to provide patient information to request assistance. Patient voiced agreement with receiving resource. No other needs identified by patient. Case closed. Referral source notified.

## 2019-01-25 ENCOUNTER — PATIENT MESSAGE (OUTPATIENT)
Dept: INTERNAL MEDICINE | Facility: CLINIC | Age: 84
End: 2019-01-25

## 2019-01-25 DIAGNOSIS — Z79.01 CURRENT USE OF LONG TERM ANTICOAGULATION: ICD-10-CM

## 2019-01-25 DIAGNOSIS — I48.20 CHRONIC ATRIAL FIBRILLATION: ICD-10-CM

## 2019-01-25 NOTE — TELEPHONE ENCOUNTER
----- Message from Danielle Mendez sent at 1/25/2019  1:30 PM CST -----  Contact: Pt [phone]  Pt would like a call back in reference to Transportation. Pt states she recently applied for Transportation and  will have to sign some forms for her to be approved. Please call and advise.

## 2019-01-28 RX ORDER — WARFARIN 3 MG/1
TABLET ORAL
Qty: 45 TABLET | Refills: 0 | Status: SHIPPED | OUTPATIENT
Start: 2019-01-28 | End: 2019-10-21 | Stop reason: SDUPTHER

## 2019-02-01 NOTE — PROGRESS NOTES
Subjective:       Patient ID: Jenniffer Jimenez is a 86 y.o. female.    Chief Complaint: Follow-up    Patient is a 86 y.o.female who presents today for follow up. At last visit, pt had complaint of numbness, tingling and double vision. MRI brain did not show any acute problems. EMG was ordered. She had to cancel the EMG but she is going to neuro soon.    Cough and congestion:  She notes colored green sputum when she does cough. Some wheezing at times but no shortness of breath. She notes hoarseness as well.    Labs: reviewed  Vaccines: up to date  Gyn: hysterectomy  Mammo: July 2018  Dexa: July 2018  Colon: due in June 2019  Eye: nov 2018    Review of Systems   Constitutional: Negative for appetite change, chills, diaphoresis, fatigue and fever.   HENT: Negative for congestion, dental problem, ear discharge, ear pain, hearing loss, postnasal drip, sinus pressure and sore throat.    Eyes: Negative for discharge, redness and itching.   Respiratory: Negative for cough, chest tightness, shortness of breath and wheezing.    Cardiovascular: Negative for chest pain, palpitations and leg swelling.   Gastrointestinal: Negative for abdominal pain, constipation, diarrhea, nausea and vomiting.   Endocrine: Negative for cold intolerance and heat intolerance.   Genitourinary: Negative for difficulty urinating, frequency, hematuria and urgency.   Musculoskeletal: Negative for arthralgias, back pain, gait problem, myalgias and neck pain.   Skin: Negative for color change and rash.   Neurological: Negative for dizziness, syncope and headaches.   Hematological: Negative for adenopathy.   Psychiatric/Behavioral: Negative for behavioral problems and sleep disturbance. The patient is not nervous/anxious.        Objective:      Physical Exam   Constitutional: She is oriented to person, place, and time. She appears well-developed and well-nourished. No distress.   HENT:   Head: Normocephalic and atraumatic.   Right Ear: External ear  normal.   Left Ear: External ear normal.   Nose: Nose normal.   Mouth/Throat: Oropharynx is clear and moist. No oropharyngeal exudate.   Eyes: Conjunctivae and EOM are normal. Pupils are equal, round, and reactive to light. Right eye exhibits no discharge. Left eye exhibits no discharge. No scleral icterus.   Neck: Normal range of motion. Neck supple. No JVD present. No thyromegaly present.   Cardiovascular: Normal rate, regular rhythm, normal heart sounds and intact distal pulses. Exam reveals no gallop and no friction rub.   No murmur heard.  Pulmonary/Chest: Effort normal and breath sounds normal. No stridor. No respiratory distress. She has no wheezes. She has no rales. She exhibits no tenderness.   Abdominal: Soft. Bowel sounds are normal. She exhibits no distension. There is no tenderness. There is no rebound.   Musculoskeletal: Normal range of motion. She exhibits no edema or tenderness.   Lymphadenopathy:     She has no cervical adenopathy.   Neurological: She is alert and oriented to person, place, and time. No cranial nerve deficit.   Skin: Skin is warm and dry. No rash noted. She is not diaphoretic. No erythema.   Psychiatric: She has a normal mood and affect. Her behavior is normal.   Nursing note and vitals reviewed.      Assessment and Plan:       1. Prediabetes  - diet controlled      2. Chronic kidney disease, stage III (moderate)  - avoiding nsaid usage; hydrating well    3. Pure hypercholesterolemia  - on statin  - labs reviewed    4. Chronic diastolic heart failure  - stable on diuretic    5. Chronic atrial fibrillation  - stable on meds, sees cardio    6. Atherosclerosis of aorta  - stable on meds; sees cardio    7. Spinal stenosis of lumbar region, unspecified whether neurogenic claudication present  - scheduled to see neuro soon    8. Cough  - doxycycline (VIBRAMYCIN) 100 MG Cap; Take 1 capsule (100 mg total) by mouth 2 (two) times daily. for 7 days  Dispense: 14 capsule; Refill: 0          No  Follow-up on file.

## 2019-02-02 ENCOUNTER — LAB VISIT (OUTPATIENT)
Dept: LAB | Facility: HOSPITAL | Age: 84
End: 2019-02-02
Attending: INTERNAL MEDICINE
Payer: MEDICARE

## 2019-02-02 DIAGNOSIS — E78.00 PURE HYPERCHOLESTEROLEMIA: ICD-10-CM

## 2019-02-02 DIAGNOSIS — I50.32 CHRONIC DIASTOLIC HEART FAILURE: ICD-10-CM

## 2019-02-02 DIAGNOSIS — Z79.01 LONG TERM (CURRENT) USE OF ANTICOAGULANTS: ICD-10-CM

## 2019-02-02 LAB
ALT SERPL W/O P-5'-P-CCNC: 19 U/L
ANION GAP SERPL CALC-SCNC: 10 MMOL/L
AST SERPL-CCNC: 19 U/L
BUN SERPL-MCNC: 25 MG/DL
CALCIUM SERPL-MCNC: 10 MG/DL
CHLORIDE SERPL-SCNC: 102 MMOL/L
CHOLEST SERPL-MCNC: 196 MG/DL
CHOLEST/HDLC SERPL: 2.6 {RATIO}
CO2 SERPL-SCNC: 27 MMOL/L
CREAT SERPL-MCNC: 1 MG/DL
EST. GFR  (AFRICAN AMERICAN): 58.9 ML/MIN/1.73 M^2
EST. GFR  (NON AFRICAN AMERICAN): 51.1 ML/MIN/1.73 M^2
GLUCOSE SERPL-MCNC: 102 MG/DL
HCT VFR BLD AUTO: 37.2 %
HDLC SERPL-MCNC: 74 MG/DL
HDLC SERPL: 37.8 %
HGB BLD-MCNC: 12 G/DL
LDLC SERPL CALC-MCNC: 104.2 MG/DL
NONHDLC SERPL-MCNC: 122 MG/DL
POTASSIUM SERPL-SCNC: 4.3 MMOL/L
SODIUM SERPL-SCNC: 139 MMOL/L
TRIGL SERPL-MCNC: 89 MG/DL

## 2019-02-02 PROCEDURE — 80048 BASIC METABOLIC PNL TOTAL CA: CPT | Mod: HCNC

## 2019-02-02 PROCEDURE — 85018 HEMOGLOBIN: CPT | Mod: HCNC

## 2019-02-02 PROCEDURE — 84460 ALANINE AMINO (ALT) (SGPT): CPT | Mod: HCNC

## 2019-02-02 PROCEDURE — 36415 COLL VENOUS BLD VENIPUNCTURE: CPT | Mod: HCNC,PO

## 2019-02-02 PROCEDURE — 85014 HEMATOCRIT: CPT | Mod: HCNC

## 2019-02-02 PROCEDURE — 80061 LIPID PANEL: CPT | Mod: HCNC

## 2019-02-02 PROCEDURE — 84450 TRANSFERASE (AST) (SGOT): CPT | Mod: HCNC

## 2019-02-14 ENCOUNTER — CLINICAL SUPPORT (OUTPATIENT)
Dept: ALLERGY | Facility: CLINIC | Age: 84
End: 2019-02-14
Payer: MEDICARE

## 2019-02-14 DIAGNOSIS — J30.9 CHRONIC ALLERGIC RHINITIS: Primary | ICD-10-CM

## 2019-02-14 PROCEDURE — 95165 ANTIGEN THERAPY SERVICES: CPT | Mod: HCNC,S$GLB,, | Performed by: ALLERGY & IMMUNOLOGY

## 2019-02-14 PROCEDURE — 99999 PR PBB SHADOW E&M-EST. PATIENT-LVL I: CPT | Mod: PBBFAC,HCNC,,

## 2019-02-14 PROCEDURE — 99499 NO LOS: ICD-10-PCS | Mod: HCNC,S$GLB,, | Performed by: ALLERGY & IMMUNOLOGY

## 2019-02-14 PROCEDURE — 95165 PR PROFES SVC,IMMUNOTHER,SINGLE/MULT AGS: ICD-10-PCS | Mod: HCNC,S$GLB,, | Performed by: ALLERGY & IMMUNOLOGY

## 2019-02-14 PROCEDURE — 99999 PR PBB SHADOW E&M-EST. PATIENT-LVL I: ICD-10-PCS | Mod: PBBFAC,HCNC,,

## 2019-02-14 PROCEDURE — 99499 UNLISTED E&M SERVICE: CPT | Mod: HCNC,S$GLB,, | Performed by: ALLERGY & IMMUNOLOGY

## 2019-02-15 ENCOUNTER — LAB VISIT (OUTPATIENT)
Dept: LAB | Facility: HOSPITAL | Age: 84
End: 2019-02-15
Attending: INTERNAL MEDICINE
Payer: MEDICARE

## 2019-02-15 ENCOUNTER — ANTI-COAG VISIT (OUTPATIENT)
Dept: CARDIOLOGY | Facility: CLINIC | Age: 84
End: 2019-02-15

## 2019-02-15 ENCOUNTER — CLINICAL SUPPORT (OUTPATIENT)
Dept: INTERNAL MEDICINE | Facility: CLINIC | Age: 84
End: 2019-02-15
Payer: MEDICARE

## 2019-02-15 ENCOUNTER — OFFICE VISIT (OUTPATIENT)
Dept: CARDIOLOGY | Facility: CLINIC | Age: 84
End: 2019-02-15
Payer: MEDICARE

## 2019-02-15 ENCOUNTER — OFFICE VISIT (OUTPATIENT)
Dept: INTERNAL MEDICINE | Facility: CLINIC | Age: 84
End: 2019-02-15
Payer: MEDICARE

## 2019-02-15 VITALS
HEIGHT: 66 IN | HEART RATE: 82 BPM | BODY MASS INDEX: 29.48 KG/M2 | WEIGHT: 183.44 LBS | DIASTOLIC BLOOD PRESSURE: 67 MMHG | SYSTOLIC BLOOD PRESSURE: 151 MMHG

## 2019-02-15 VITALS
RESPIRATION RATE: 20 BRPM | HEART RATE: 76 BPM | SYSTOLIC BLOOD PRESSURE: 154 MMHG | TEMPERATURE: 98 F | BODY MASS INDEX: 29.72 KG/M2 | HEIGHT: 66 IN | WEIGHT: 184.94 LBS | DIASTOLIC BLOOD PRESSURE: 80 MMHG

## 2019-02-15 DIAGNOSIS — I48.20 CHRONIC ATRIAL FIBRILLATION: ICD-10-CM

## 2019-02-15 DIAGNOSIS — I70.0 ATHEROSCLEROSIS OF AORTA: ICD-10-CM

## 2019-02-15 DIAGNOSIS — N18.30 CHRONIC KIDNEY DISEASE, STAGE III (MODERATE): ICD-10-CM

## 2019-02-15 DIAGNOSIS — I48.20 CHRONIC ATRIAL FIBRILLATION: Primary | ICD-10-CM

## 2019-02-15 DIAGNOSIS — Z79.01 CURRENT USE OF LONG TERM ANTICOAGULATION: ICD-10-CM

## 2019-02-15 DIAGNOSIS — E78.00 PURE HYPERCHOLESTEROLEMIA: ICD-10-CM

## 2019-02-15 DIAGNOSIS — I50.32 CHRONIC DIASTOLIC HEART FAILURE: ICD-10-CM

## 2019-02-15 DIAGNOSIS — R73.03 PREDIABETES: Primary | ICD-10-CM

## 2019-02-15 DIAGNOSIS — M48.061 SPINAL STENOSIS OF LUMBAR REGION, UNSPECIFIED WHETHER NEUROGENIC CLAUDICATION PRESENT: ICD-10-CM

## 2019-02-15 DIAGNOSIS — I10 ESSENTIAL HYPERTENSION: ICD-10-CM

## 2019-02-15 DIAGNOSIS — J30.1 CHRONIC ALLERGIC RHINITIS DUE TO POLLEN: ICD-10-CM

## 2019-02-15 DIAGNOSIS — R05.9 COUGH: ICD-10-CM

## 2019-02-15 LAB
INR PPP: 1.9
INR PPP: 1.9
PROTHROMBIN TIME: 18.9 SEC

## 2019-02-15 PROCEDURE — 99999 PR PBB SHADOW E&M-EST. PATIENT-LVL III: CPT | Mod: PBBFAC,HCNC,, | Performed by: INTERNAL MEDICINE

## 2019-02-15 PROCEDURE — 99499 UNLISTED E&M SERVICE: CPT | Mod: HCNC,S$GLB,, | Performed by: ALLERGY & IMMUNOLOGY

## 2019-02-15 PROCEDURE — 1101F PR PT FALLS ASSESS DOC 0-1 FALLS W/OUT INJ PAST YR: ICD-10-PCS | Mod: HCNC,CPTII,S$GLB, | Performed by: INTERNAL MEDICINE

## 2019-02-15 PROCEDURE — 99999 PR PBB SHADOW E&M-EST. PATIENT-LVL III: ICD-10-PCS | Mod: PBBFAC,HCNC,, | Performed by: INTERNAL MEDICINE

## 2019-02-15 PROCEDURE — 1101F PT FALLS ASSESS-DOCD LE1/YR: CPT | Mod: HCNC,CPTII,S$GLB, | Performed by: INTERNAL MEDICINE

## 2019-02-15 PROCEDURE — 99213 PR OFFICE/OUTPT VISIT, EST, LEVL III, 20-29 MIN: ICD-10-PCS | Mod: HCNC,S$GLB,, | Performed by: INTERNAL MEDICINE

## 2019-02-15 PROCEDURE — 95115 IMMUNOTHERAPY ONE INJECTION: CPT | Mod: HCNC,S$GLB,, | Performed by: FAMILY MEDICINE

## 2019-02-15 PROCEDURE — 85610 PROTHROMBIN TIME: CPT | Mod: HCNC

## 2019-02-15 PROCEDURE — 99213 OFFICE O/P EST LOW 20 MIN: CPT | Mod: HCNC,S$GLB,, | Performed by: INTERNAL MEDICINE

## 2019-02-15 PROCEDURE — 95115 PR IMMUNOTHERAPY, ONE INJECTION: ICD-10-PCS | Mod: HCNC,S$GLB,, | Performed by: FAMILY MEDICINE

## 2019-02-15 PROCEDURE — 99499 NO LOS: ICD-10-PCS | Mod: HCNC,S$GLB,, | Performed by: ALLERGY & IMMUNOLOGY

## 2019-02-15 PROCEDURE — 36415 COLL VENOUS BLD VENIPUNCTURE: CPT | Mod: HCNC,PO

## 2019-02-15 RX ORDER — FUROSEMIDE 20 MG/1
TABLET ORAL
Qty: 100 TABLET | Refills: 3 | Status: SHIPPED | OUTPATIENT
Start: 2019-02-15 | End: 2020-03-20

## 2019-02-15 RX ORDER — DOXYCYCLINE 100 MG/1
100 CAPSULE ORAL 2 TIMES DAILY
Qty: 14 CAPSULE | Refills: 0 | Status: SHIPPED | OUTPATIENT
Start: 2019-02-15 | End: 2019-02-22

## 2019-02-15 RX ORDER — MUPIROCIN 20 MG/G
OINTMENT TOPICAL 3 TIMES DAILY
Qty: 30 G | Refills: 1 | Status: SHIPPED | OUTPATIENT
Start: 2019-02-15 | End: 2019-11-25 | Stop reason: SDUPTHER

## 2019-02-15 NOTE — PROGRESS NOTES
Subjective:   Patient ID:  Jenniffer Jimenez is a 86 y.o. female who presents for follow-up of Atrial Fibrillation      Problem List:  Atrial fib - chronic 1/16  HTN  Hypercholesterolemia  TIA 1997 and then transient vision loss ~2005  CHF diastolic    HPI:   Jenniffer Jimenez takes low-dose furosemide daily for diastolic heart failure.  Takes an extra furosemide very infrequently. She reports mild shortness of breath with exertion.  She is remarkably independent and agile.  She demonstrated from me that she can bend down and mop the floor with her hand and a paper towel.    She does not report of palpitations.  Occasional lightheadedness but not when she bends down and mops the floor.  On warfarin for stroke prevention. She bruises easily but does not bleed.   Blood pressure elevated in clinic today.  She checks it at home the and reports blood pressures in the 120s/60s.   On pravastatin for hypercholesterolemia. LDL has decreased slightly from 114-120 to 104 mg/dl. HDL remains in the 70s.       ROS   Negative except as noted in the HPI.      Current Outpatient Medications   Medication Sig    acetaminophen (TYLENOL) 500 MG tablet Take 1,000 mg by mouth 2 (two) times daily as needed for Pain. Stated that per Cards, she can take only two doses per day prn    aspirin (ECOTRIN) 81 MG EC tablet Take 81 mg by mouth nightly.     doxycycline (VIBRAMYCIN) 100 MG Cap Take 1 capsule (100 mg total) by mouth 2 (two) times daily. for 7 days    fluticasone (FLONASE) 50 mcg/actuation nasal spray 1 spray (50 mcg total) by Each Nare route once daily.    furosemide (LASIX) 20 MG tablet TAKE 1 TABLET(20 MG) BY MOUTH EVERY DAY    mupirocin (BACTROBAN) 2 % ointment Apply topically 3 (three) times daily.    peg 400-propylene glycol (SYSTANE) 0.4-0.3 % Drop Place 1-2 drops into both eyes as needed.     potassium chloride (MICRO-K) 10 MEQ CpSR TAKE 1 CAPSULE(10 MEQ) BY MOUTH EVERY DAY    triamcinolone (NASACORT) 55 mcg nasal  "inhaler 2 sprays by Nasal route once daily. (Patient taking differently: 2 sprays by Nasal route 2 (two) times daily as needed. )    warfarin (COUMADIN) 3 MG tablet TAKE 1 AND 1/2 TABLET BY MOUTH DAILY    pravastatin (PRAVACHOL) 40 MG tablet Take 1 tablet (40 mg total) by mouth 2 (two) times daily.     Current Facility-Administered Medications   Medication    Allergy Mix       Social History     Tobacco Use    Smoking status: Former Smoker     Types: Cigarettes     Last attempt to quit: 10/29/1982     Years since quittin.3    Smokeless tobacco: Never Used   Substance Use Topics    Alcohol use: Yes     Alcohol/week: 0.0 oz     Comment: socially    Drug use: No         Objective:     Physical Exam   Constitutional: She is oriented to person, place, and time. She appears well-developed and well-nourished.   BP (!) 151/67 (BP Location: Right arm, Patient Position: Sitting)   Pulse 82   Ht 5' 6" (1.676 m)   Wt 83.2 kg (183 lb 6.8 oz)   LMP  (LMP Unknown)   BMI 29.61 kg/m²      Neck: No JVD present.   Cardiovascular: Normal rate. An irregularly irregular rhythm present.   No murmur heard.  Pulses:       Radial pulses are 2+ on the right side, and 2+ on the left side.   Pulmonary/Chest: She has no decreased breath sounds. She has no wheezes. She has no rales. Chest wall is not dull to percussion.   Abdominal: Soft. Normal appearance. There is no splenomegaly or hepatomegaly. There is no tenderness.   Musculoskeletal:        Right lower leg: She exhibits no edema.        Left lower leg: She exhibits no edema.   Neurological: She is alert and oriented to person, place, and time.   Skin: Skin is warm. Bruising noted. Nails show no clubbing.   Psychiatric: Her speech is normal and behavior is normal. Cognition and memory are normal.           Lab Results   Component Value Date    CHOL 196 2019    HDL 74 2019    LDLCALC 104.2 2019    TRIG 89 2019    CHOLHDL 37.8 2019     Lab " Results   Component Value Date     02/02/2019    CREATININE 1.0 02/02/2019    BUN 25 (H) 02/02/2019     02/02/2019    K 4.3 02/02/2019     02/02/2019    CO2 27 02/02/2019     Lab Results   Component Value Date    ALT 19 02/02/2019    AST 19 02/02/2019    ALKPHOS 127 12/03/2018    BILITOT 0.7 12/03/2018           Assessment and Plan:     1. Chronic atrial fibrillation    2. Current use of long term anticoagulation    3. Chronic diastolic heart failure    4. Essential hypertension    5. Pure hypercholesterolemia      Stop aspirin.  Continue other meds.  Exercise counseling.  Cholesterol education.      Follow-up in about 1 year (around 2/15/2020).

## 2019-02-18 NOTE — PROGRESS NOTES
Patient wants wants to know if she gets lab on 2/26 when should she start the Doxycycline, call back # [phone]

## 2019-02-19 NOTE — PROGRESS NOTES
Pt states she will not have reliable transportation today for INR check; PT did not start new meds (doxy) on 2/16 like planned; also reports starting new meds (doxy) 2/22 and request appt scheduled on 2/26; 2/26 will be the only available day/date to have a f/u INR appt.

## 2019-02-26 ENCOUNTER — ANTI-COAG VISIT (OUTPATIENT)
Dept: CARDIOLOGY | Facility: CLINIC | Age: 84
End: 2019-02-26
Payer: MEDICARE

## 2019-02-26 DIAGNOSIS — Z79.01 LONG TERM (CURRENT) USE OF ANTICOAGULANTS: Primary | ICD-10-CM

## 2019-02-26 DIAGNOSIS — Z79.01 CURRENT USE OF LONG TERM ANTICOAGULATION: ICD-10-CM

## 2019-02-26 DIAGNOSIS — I48.20 CHRONIC ATRIAL FIBRILLATION: ICD-10-CM

## 2019-02-26 LAB — INR PPP: 2.1 (ref 2–3)

## 2019-02-26 PROCEDURE — 85610 POCT INR: ICD-10-PCS | Mod: QW,HCNC,S$GLB, | Performed by: INTERNAL MEDICINE

## 2019-02-26 PROCEDURE — 99211 OFF/OP EST MAY X REQ PHY/QHP: CPT | Mod: 25,HCNC,S$GLB, | Performed by: INTERNAL MEDICINE

## 2019-02-26 PROCEDURE — 99211 PR OFFICE/OUTPT VISIT, EST, LEVL I: ICD-10-PCS | Mod: 25,HCNC,S$GLB, | Performed by: INTERNAL MEDICINE

## 2019-02-26 PROCEDURE — 85610 PROTHROMBIN TIME: CPT | Mod: QW,HCNC,S$GLB, | Performed by: INTERNAL MEDICINE

## 2019-02-26 NOTE — PROGRESS NOTES
INR within range, reports she is still taking Doxycycline 100 mg BID; will take 1 tab on 2/28/19 as only change to regular scheduled dosing with follow up in 3 weeks. SARAH THOMPSON assisted with dosing    Patient was re-educated on situations that would require placing a call to the Coumadin Clinic, including bleeding or unusual bruising issues, changes in health, diet or medications, upcoming procedures that require Coumadin interruption, and missed Coumadin dose(s). Patient expressed understanding that avoidance of consistency with these parameters could cause fluctuations in INR, leading to more frequent visits and increase risk of adverse events.

## 2019-03-13 ENCOUNTER — ANTI-COAG VISIT (OUTPATIENT)
Dept: CARDIOLOGY | Facility: CLINIC | Age: 84
End: 2019-03-13
Payer: MEDICARE

## 2019-03-13 DIAGNOSIS — I48.20 CHRONIC ATRIAL FIBRILLATION: ICD-10-CM

## 2019-03-13 DIAGNOSIS — Z79.01 CURRENT USE OF LONG TERM ANTICOAGULATION: Primary | ICD-10-CM

## 2019-03-13 LAB — INR PPP: 2 (ref 2–3)

## 2019-03-13 PROCEDURE — 99211 OFF/OP EST MAY X REQ PHY/QHP: CPT | Mod: 25,HCNC,S$GLB,

## 2019-03-13 PROCEDURE — 99211 PR OFFICE/OUTPT VISIT, EST, LEVL I: ICD-10-PCS | Mod: 25,HCNC,S$GLB,

## 2019-03-13 PROCEDURE — 85610 PROTHROMBIN TIME: CPT | Mod: QW,HCNC,S$GLB, | Performed by: INTERNAL MEDICINE

## 2019-03-13 PROCEDURE — 85610 POCT INR: ICD-10-PCS | Mod: QW,HCNC,S$GLB, | Performed by: INTERNAL MEDICINE

## 2019-03-13 NOTE — PROGRESS NOTES
INR within therapeutic range today. Bruising noted to arms from use. Patient denies any bleeding or other changes that would affect warfarin therapy. Will maintain current dose and follow up in 5 weeks. We would like to follow up in 3 weeks but patient refused. Patient was re-educated on situations that would require placing a call to the Coumadin  Clinic, including bleeding or unusual bruising issues, changes in health, diet or medications,upcoming procedures that require warfarin interruption, and missed Coumadin dose(s). Patient expressed understanding that avoidance of consistency with these parameters could cause fluctuations in INR, leading to more frequent visits and increase risk of adverse events. Care plan made with KALYN Lorenzo D

## 2019-03-16 ENCOUNTER — OFFICE VISIT (OUTPATIENT)
Dept: URGENT CARE | Facility: CLINIC | Age: 84
End: 2019-03-16
Payer: MEDICARE

## 2019-03-16 VITALS
SYSTOLIC BLOOD PRESSURE: 127 MMHG | WEIGHT: 183 LBS | TEMPERATURE: 98 F | HEIGHT: 66 IN | RESPIRATION RATE: 18 BRPM | BODY MASS INDEX: 29.41 KG/M2 | OXYGEN SATURATION: 98 % | HEART RATE: 73 BPM | DIASTOLIC BLOOD PRESSURE: 61 MMHG

## 2019-03-16 DIAGNOSIS — T14.8XXA ABRASION: Primary | ICD-10-CM

## 2019-03-16 PROCEDURE — 99214 OFFICE O/P EST MOD 30 MIN: CPT | Mod: S$GLB,,, | Performed by: NURSE PRACTITIONER

## 2019-03-16 PROCEDURE — 1101F PT FALLS ASSESS-DOCD LE1/YR: CPT | Mod: CPTII,S$GLB,, | Performed by: NURSE PRACTITIONER

## 2019-03-16 PROCEDURE — 99214 PR OFFICE/OUTPT VISIT, EST, LEVL IV, 30-39 MIN: ICD-10-PCS | Mod: S$GLB,,, | Performed by: NURSE PRACTITIONER

## 2019-03-16 PROCEDURE — 1101F PR PT FALLS ASSESS DOC 0-1 FALLS W/OUT INJ PAST YR: ICD-10-PCS | Mod: CPTII,S$GLB,, | Performed by: NURSE PRACTITIONER

## 2019-03-16 RX ORDER — MUPIROCIN 20 MG/G
OINTMENT TOPICAL
Qty: 22 G | Refills: 1 | Status: SHIPPED | OUTPATIENT
Start: 2019-03-16 | End: 2020-12-17

## 2019-03-16 NOTE — PROGRESS NOTES
"Subjective:       Patient ID: Jenniffer Jimenez is a 87 y.o. female.    Vitals:  height is 5' 6" (1.676 m) and weight is 83 kg (183 lb). Her oral temperature is 97.7 °F (36.5 °C). Her blood pressure is 127/61 and her pulse is 73. Her respiration is 18 and oxygen saturation is 98%.     Chief Complaint: Leg Swelling    Pt reports bilateral leg swelling for three days.she states that the right leg will swell sometimes and that is due to her AFIB per her doctor. Pt states she also has a cut on her right lower leg from a car door one week ago. Pt reports redness around the wound. Pt states she has been taking extra lasix to bring down the swelling without relief.   Tetanus is utd per the patient       Edema   This is a new problem. The current episode started in the past 7 days. The problem occurs constantly. The problem has been unchanged. Associated symptoms include joint swelling and numbness. Pertinent negatives include no arthralgias, chest pain, chills, congestion, coughing, fatigue, fever, headaches, myalgias, nausea, rash, sore throat, vertigo, vomiting or weakness. Nothing aggravates the symptoms. She has tried nothing (lasix) for the symptoms. The treatment provided no relief.       Constitution: Negative for chills, fatigue and fever.   HENT: Negative for congestion and sore throat.    Neck: Negative for painful lymph nodes.   Cardiovascular: Negative for chest pain and leg swelling.   Eyes: Negative for double vision and blurred vision.   Respiratory: Negative for cough and shortness of breath.    Gastrointestinal: Negative for nausea, vomiting and diarrhea.   Genitourinary: Negative for dysuria, frequency, urgency and history of kidney stones.   Musculoskeletal: Positive for joint swelling. Negative for joint pain, muscle cramps and muscle ache.   Skin: Positive for lesion. Negative for color change, pale, rash and bruising.   Allergic/Immunologic: Negative for seasonal allergies.   Neurological: Positive " for numbness. Negative for dizziness, history of vertigo, light-headedness, passing out and headaches.   Hematologic/Lymphatic: Negative for swollen lymph nodes.   Psychiatric/Behavioral: Negative for nervous/anxious, sleep disturbance and depression. The patient is not nervous/anxious.        Objective:      Physical Exam   Constitutional: She is oriented to person, place, and time. She appears well-developed and well-nourished.   HENT:   Head: Normocephalic.   Eyes: Pupils are equal, round, and reactive to light.   Neck: Normal range of motion. Neck supple.   Pulmonary/Chest: Effort normal. No respiratory distress. She has no wheezes.   Musculoskeletal: Normal range of motion.        Legs:  Neurological: She is alert and oriented to person, place, and time.   Skin: Skin is warm and dry.   Psychiatric: She has a normal mood and affect. Her behavior is normal. Judgment and thought content normal.   Vitals reviewed.      Assessment:       1. Abrasion        Plan:         Abrasion    Other orders  -     mupirocin (BACTROBAN) 2 % ointment; Apply to affected area 3 times daily  Dispense: 22 g; Refill: 1      Patient Instructions       Follow with your pcp, call for appt  Follow with neurology as scheduled       Wound Care  Taking proper care of your wound will help it heal. Your healthcare provider may show you how to clean and dress the wound. He or she will also explain how to tell if the wound is healing normally. If you are unsure of how to take care of the wound, be sure to clarify what dressing to use and how often you should change the bandages. Here are the basic steps.     A wound that's not healing normally may be dark in color or have white streaks.   Wash your hands  Tips for washing your hands include:  · Use liquid soap and lather for 2 minutes. Scrub between your fingers and under your nails.  · Rinse with warm water, keeping your fingers pointing down.  · Use a paper towel to dry your hands and to turn  off the faucet.  Remove the used dressing  Here are suggestions for removing the dressing:  · If dressing changes cause you pain, be sure to take your pain medicine as prescribed by your healthcare provider 30 minutes before dressing changes.  · Set up your supplies.  · Put on disposable gloves if youre dressing a wound for someone else or your wound is infected.  · Loosen the tape by pulling gently toward the wound.  · Gently take off the old dressing. If the dressing is stuck to the wound, moisten it with saline (if available) or clean water.  · If you have a drain or tube in the wound, be careful not to pull on it.  · Remove the dressing 1 layer at a time and put it in a plastic bag.  · Remove your gloves.  Inspect and dress the wound  Check the wound carefully:  · Each time you change the dressing, inspect the wound carefully to be sure its healing normally by making sure your wound appears to be pink and moist, and is free of infection.    · Wash your hands again. Put on a new pair of gloves.  · Clean and dress the wound as directed by your healthcare provider or nurse. Do not put anything in the wound that is not prescribed or directed by your healthcare provider. If you have a drain or tube, be careful not to pull on it. Make sure to secure the drain or tube as well.  · Put all supplies in a plastic bag. Seal the bag and put it in the trash.  · Be sure to wash your hands again.  Call your healthcare provider  Call your healthcare provider if you see any of the following signs of a problem:  · Bleeding that soaks the dressing  · Pink fluid weeping from the wound  · Increased drainage or drainage that is yellow, yellow-green, or foul-smelling  · Increased swelling or pain, or redness or swelling in the skin around the wound  · A change in the color of the wound, or if streaks develop in a direction away from the wound  · The area between any stitches opens up  · An increase in the size of the wound  · A fever  of 100.4°F (38°C) or higher, or as directed by your healthcare provider  · Chills, increased fatigue, or a loss of appetite      Date Last Reviewed: 7/30/2015  © 3541-2998 [employer]. [address] [place] [address], [place] [address]. All rights reserved. This information is not intended as a substitute for professional medical care. Always follow your healthcare professional's instructions.

## 2019-03-16 NOTE — PATIENT INSTRUCTIONS
Follow with your pcp, call for appt  Follow with neurology as scheduled       Wound Care  Taking proper care of your wound will help it heal. Your healthcare provider may show you how to clean and dress the wound. He or she will also explain how to tell if the wound is healing normally. If you are unsure of how to take care of the wound, be sure to clarify what dressing to use and how often you should change the bandages. Here are the basic steps.     A wound that's not healing normally may be dark in color or have white streaks.   Wash your hands  Tips for washing your hands include:  · Use liquid soap and lather for 2 minutes. Scrub between your fingers and under your nails.  · Rinse with warm water, keeping your fingers pointing down.  · Use a paper towel to dry your hands and to turn off the faucet.  Remove the used dressing  Here are suggestions for removing the dressing:  · If dressing changes cause you pain, be sure to take your pain medicine as prescribed by your healthcare provider 30 minutes before dressing changes.  · Set up your supplies.  · Put on disposable gloves if youre dressing a wound for someone else or your wound is infected.  · Loosen the tape by pulling gently toward the wound.  · Gently take off the old dressing. If the dressing is stuck to the wound, moisten it with saline (if available) or clean water.  · If you have a drain or tube in the wound, be careful not to pull on it.  · Remove the dressing 1 layer at a time and put it in a plastic bag.  · Remove your gloves.  Inspect and dress the wound  Check the wound carefully:  · Each time you change the dressing, inspect the wound carefully to be sure its healing normally by making sure your wound appears to be pink and moist, and is free of infection.    · Wash your hands again. Put on a new pair of gloves.  · Clean and dress the wound as directed by your healthcare provider or nurse. Do not put anything in the wound that is not  prescribed or directed by your healthcare provider. If you have a drain or tube, be careful not to pull on it. Make sure to secure the drain or tube as well.  · Put all supplies in a plastic bag. Seal the bag and put it in the trash.  · Be sure to wash your hands again.  Call your healthcare provider  Call your healthcare provider if you see any of the following signs of a problem:  · Bleeding that soaks the dressing  · Pink fluid weeping from the wound  · Increased drainage or drainage that is yellow, yellow-green, or foul-smelling  · Increased swelling or pain, or redness or swelling in the skin around the wound  · A change in the color of the wound, or if streaks develop in a direction away from the wound  · The area between any stitches opens up  · An increase in the size of the wound  · A fever of 100.4°F (38°C) or higher, or as directed by your healthcare provider  · Chills, increased fatigue, or a loss of appetite      Date Last Reviewed: 7/30/2015  © 1039-0471 [employer] [address] [place] [address], [place] [address]. All rights reserved. This information is not intended as a substitute for professional medical care. Always follow your healthcare professional's instructions.

## 2019-03-20 ENCOUNTER — CLINICAL SUPPORT (OUTPATIENT)
Dept: INTERNAL MEDICINE | Facility: CLINIC | Age: 84
End: 2019-03-20
Payer: MEDICARE

## 2019-03-20 ENCOUNTER — PROCEDURE VISIT (OUTPATIENT)
Dept: NEUROLOGY | Facility: CLINIC | Age: 84
End: 2019-03-20
Payer: MEDICARE

## 2019-03-20 DIAGNOSIS — R20.0 NUMBNESS AND TINGLING: ICD-10-CM

## 2019-03-20 DIAGNOSIS — J30.9 CHRONIC ALLERGIC RHINITIS: ICD-10-CM

## 2019-03-20 DIAGNOSIS — R20.2 NUMBNESS AND TINGLING: ICD-10-CM

## 2019-03-20 DIAGNOSIS — M62.838 MUSCLE SPASMS OF BOTH LOWER EXTREMITIES: ICD-10-CM

## 2019-03-20 PROCEDURE — 95908 PR NERVE CONDUCTION STUDY; 3-4 STUDIES: ICD-10-PCS | Mod: HCNC,S$GLB,, | Performed by: NEUROMUSCULOSKELETAL MEDICINE & OMM

## 2019-03-20 PROCEDURE — 99499 UNLISTED E&M SERVICE: CPT | Mod: HCNC,S$GLB,, | Performed by: ALLERGY & IMMUNOLOGY

## 2019-03-20 PROCEDURE — 95115 PR IMMUNOTHERAPY, ONE INJECTION: ICD-10-PCS | Mod: HCNC,S$GLB,, | Performed by: FAMILY MEDICINE

## 2019-03-20 PROCEDURE — 99499 NO LOS: ICD-10-PCS | Mod: HCNC,S$GLB,, | Performed by: ALLERGY & IMMUNOLOGY

## 2019-03-20 PROCEDURE — 95886 PR EMG COMPLETE, W/ NERVE CONDUCTION STUDIES, 5+ MUSCLES: ICD-10-PCS | Mod: HCNC,S$GLB,, | Performed by: NEUROMUSCULOSKELETAL MEDICINE & OMM

## 2019-03-20 PROCEDURE — 95115 IMMUNOTHERAPY ONE INJECTION: CPT | Mod: HCNC,S$GLB,, | Performed by: FAMILY MEDICINE

## 2019-03-20 PROCEDURE — 95886 MUSC TEST DONE W/N TEST COMP: CPT | Mod: HCNC,S$GLB,, | Performed by: NEUROMUSCULOSKELETAL MEDICINE & OMM

## 2019-03-20 PROCEDURE — 95908 NRV CNDJ TST 3-4 STUDIES: CPT | Mod: HCNC,S$GLB,, | Performed by: NEUROMUSCULOSKELETAL MEDICINE & OMM

## 2019-03-26 ENCOUNTER — TELEPHONE (OUTPATIENT)
Dept: INTERNAL MEDICINE | Facility: CLINIC | Age: 84
End: 2019-03-26

## 2019-03-26 DIAGNOSIS — M54.16 LUMBAR RADICULOPATHY: Primary | ICD-10-CM

## 2019-03-26 NOTE — TELEPHONE ENCOUNTER
Pt requesting printout of results.     She stated that she has already been to back and spine and neck surgery was recommended, she has refused due to age and risk.

## 2019-03-26 NOTE — TELEPHONE ENCOUNTER
----- Message from Micaela Denis sent at 3/26/2019  1:51 PM CDT -----  Contact: patient [phone]  Patient would like to get test results  Name of test neurology  / EMG  Date of test:  03/20/19  Ordering provider:   Where was the test performed:  [place]  Would the patient rather a call back or a response via [facility]?:  No  Comments:  Pt can't get results in her [facility] account.  told patientt that you would be calling her with results.

## 2019-03-27 ENCOUNTER — CLINICAL SUPPORT (OUTPATIENT)
Dept: INTERNAL MEDICINE | Facility: CLINIC | Age: 84
End: 2019-03-27
Payer: MEDICARE

## 2019-03-27 DIAGNOSIS — J30.9 CHRONIC ALLERGIC RHINITIS: ICD-10-CM

## 2019-03-27 PROCEDURE — 95115 IMMUNOTHERAPY ONE INJECTION: CPT | Mod: HCNC,S$GLB,, | Performed by: FAMILY MEDICINE

## 2019-03-27 PROCEDURE — 95115 PR IMMUNOTHERAPY, ONE INJECTION: ICD-10-PCS | Mod: HCNC,S$GLB,, | Performed by: FAMILY MEDICINE

## 2019-03-27 PROCEDURE — 99499 NO LOS: ICD-10-PCS | Mod: HCNC,S$GLB,, | Performed by: ALLERGY & IMMUNOLOGY

## 2019-03-27 PROCEDURE — 99499 UNLISTED E&M SERVICE: CPT | Mod: HCNC,S$GLB,, | Performed by: ALLERGY & IMMUNOLOGY

## 2019-04-03 ENCOUNTER — CLINICAL SUPPORT (OUTPATIENT)
Dept: INTERNAL MEDICINE | Facility: CLINIC | Age: 84
End: 2019-04-03
Payer: MEDICARE

## 2019-04-03 DIAGNOSIS — J30.9 CHRONIC ALLERGIC RHINITIS: ICD-10-CM

## 2019-04-03 PROCEDURE — 99499 NO LOS: ICD-10-PCS | Mod: HCNC,S$GLB,, | Performed by: ALLERGY & IMMUNOLOGY

## 2019-04-03 PROCEDURE — 99499 UNLISTED E&M SERVICE: CPT | Mod: HCNC,S$GLB,, | Performed by: ALLERGY & IMMUNOLOGY

## 2019-04-03 PROCEDURE — 95115 PR IMMUNOTHERAPY, ONE INJECTION: ICD-10-PCS | Mod: HCNC,S$GLB,, | Performed by: INTERNAL MEDICINE

## 2019-04-03 PROCEDURE — 95115 IMMUNOTHERAPY ONE INJECTION: CPT | Mod: HCNC,S$GLB,, | Performed by: INTERNAL MEDICINE

## 2019-04-09 ENCOUNTER — ANTI-COAG VISIT (OUTPATIENT)
Dept: CARDIOLOGY | Facility: CLINIC | Age: 84
End: 2019-04-09
Payer: MEDICARE

## 2019-04-09 DIAGNOSIS — I48.20 CHRONIC ATRIAL FIBRILLATION: ICD-10-CM

## 2019-04-09 DIAGNOSIS — Z79.01 CURRENT USE OF LONG TERM ANTICOAGULATION: Primary | ICD-10-CM

## 2019-04-09 LAB — INR PPP: 2.1 (ref 2–3)

## 2019-04-09 PROCEDURE — 93793 PR ANTICOAGULANT MGMT FOR PT TAKING WARFARIN: ICD-10-PCS | Mod: HCNC,S$GLB,, | Performed by: PHARMACIST

## 2019-04-09 PROCEDURE — 85610 POCT INR: ICD-10-PCS | Mod: QW,HCNC,S$GLB, | Performed by: INTERNAL MEDICINE

## 2019-04-09 PROCEDURE — 93793 ANTICOAG MGMT PT WARFARIN: CPT | Mod: HCNC,S$GLB,, | Performed by: PHARMACIST

## 2019-04-09 PROCEDURE — 85610 PROTHROMBIN TIME: CPT | Mod: QW,HCNC,S$GLB, | Performed by: INTERNAL MEDICINE

## 2019-04-09 NOTE — PROGRESS NOTES
INR within therapeutic range today. Bruising noted to arms from use. Patient denies any bleeding or other changes that would affect warfarin therapy. Will maintain current dose and follow up in 5 weeks. Patient advised to call coumadin clinic with any changes or concerns. Patient was re-educated on situations that would require placing a call to the Coumadin  Clinic, including bleeding or unusual bruising issues, changes in health, diet or medications,upcoming procedures that require warfarin interruption, and missed Coumadin dose(s). Patient expressed understanding that avoidance of consistency with these parameters could cause fluctuations in INR, leading to more frequent visits and increase risk of adverse events.

## 2019-04-12 ENCOUNTER — CLINICAL SUPPORT (OUTPATIENT)
Dept: INTERNAL MEDICINE | Facility: CLINIC | Age: 84
End: 2019-04-12
Payer: MEDICARE

## 2019-04-12 DIAGNOSIS — J30.89 CHRONIC NONSEASONAL ALLERGIC RHINITIS DUE TO POLLEN: ICD-10-CM

## 2019-04-12 PROCEDURE — 99499 NO LOS: ICD-10-PCS | Mod: HCNC,S$GLB,, | Performed by: ALLERGY & IMMUNOLOGY

## 2019-04-12 PROCEDURE — 95115 PR IMMUNOTHERAPY, ONE INJECTION: ICD-10-PCS | Mod: HCNC,S$GLB,, | Performed by: FAMILY MEDICINE

## 2019-04-12 PROCEDURE — 99499 UNLISTED E&M SERVICE: CPT | Mod: HCNC,S$GLB,, | Performed by: ALLERGY & IMMUNOLOGY

## 2019-04-12 PROCEDURE — 95115 IMMUNOTHERAPY ONE INJECTION: CPT | Mod: HCNC,S$GLB,, | Performed by: FAMILY MEDICINE

## 2019-04-22 ENCOUNTER — CLINICAL SUPPORT (OUTPATIENT)
Dept: INTERNAL MEDICINE | Facility: CLINIC | Age: 84
End: 2019-04-22
Payer: MEDICARE

## 2019-04-22 DIAGNOSIS — J30.9 CHRONIC ALLERGIC RHINITIS: ICD-10-CM

## 2019-04-22 PROCEDURE — 99499 NO LOS: ICD-10-PCS | Mod: HCNC,S$GLB,, | Performed by: ALLERGY & IMMUNOLOGY

## 2019-04-22 PROCEDURE — 99499 UNLISTED E&M SERVICE: CPT | Mod: HCNC,S$GLB,, | Performed by: ALLERGY & IMMUNOLOGY

## 2019-04-22 PROCEDURE — 95115 PR IMMUNOTHERAPY, ONE INJECTION: ICD-10-PCS | Mod: HCNC,S$GLB,, | Performed by: FAMILY MEDICINE

## 2019-04-22 PROCEDURE — 95115 IMMUNOTHERAPY ONE INJECTION: CPT | Mod: HCNC,S$GLB,, | Performed by: FAMILY MEDICINE

## 2019-04-24 RX ORDER — FUROSEMIDE 20 MG/1
TABLET ORAL
Qty: 90 TABLET | Refills: 3 | Status: SHIPPED | OUTPATIENT
Start: 2019-04-24 | End: 2019-11-22 | Stop reason: SDUPTHER

## 2019-05-13 ENCOUNTER — ANTI-COAG VISIT (OUTPATIENT)
Dept: CARDIOLOGY | Facility: CLINIC | Age: 84
End: 2019-05-13
Payer: MEDICARE

## 2019-05-13 DIAGNOSIS — I48.20 CHRONIC ATRIAL FIBRILLATION: ICD-10-CM

## 2019-05-13 DIAGNOSIS — Z79.01 CURRENT USE OF LONG TERM ANTICOAGULATION: Primary | ICD-10-CM

## 2019-05-13 LAB — INR PPP: 1.8 (ref 2–3)

## 2019-05-13 PROCEDURE — 85610 POCT INR: ICD-10-PCS | Mod: QW,HCNC,S$GLB, | Performed by: INTERNAL MEDICINE

## 2019-05-13 PROCEDURE — 93793 PR ANTICOAGULANT MGMT FOR PT TAKING WARFARIN: ICD-10-PCS | Mod: HCNC,S$GLB,,

## 2019-05-13 PROCEDURE — 93793 ANTICOAG MGMT PT WARFARIN: CPT | Mod: HCNC,S$GLB,,

## 2019-05-13 PROCEDURE — 85610 PROTHROMBIN TIME: CPT | Mod: QW,HCNC,S$GLB, | Performed by: INTERNAL MEDICINE

## 2019-05-13 NOTE — PROGRESS NOTES
INR not at goal. Medications, chart, and patient findings reviewed. See calendar for adjustments to dose and follow up plan.  Will maintain current regimen since pt slightly below goal.  Plan to re-assess.  Pt denies any changes.

## 2019-05-20 ENCOUNTER — CLINICAL SUPPORT (OUTPATIENT)
Dept: INTERNAL MEDICINE | Facility: CLINIC | Age: 84
End: 2019-05-20
Payer: MEDICARE

## 2019-05-20 DIAGNOSIS — J31.0 CHRONIC RHINITIS: ICD-10-CM

## 2019-05-20 PROCEDURE — 99499 NO LOS: ICD-10-PCS | Mod: HCNC,S$GLB,, | Performed by: ALLERGY & IMMUNOLOGY

## 2019-05-20 PROCEDURE — 99499 UNLISTED E&M SERVICE: CPT | Mod: HCNC,S$GLB,, | Performed by: ALLERGY & IMMUNOLOGY

## 2019-05-20 PROCEDURE — 95115 IMMUNOTHERAPY ONE INJECTION: CPT | Mod: HCNC,S$GLB,, | Performed by: FAMILY MEDICINE

## 2019-05-20 PROCEDURE — 95115 PR IMMUNOTHERAPY, ONE INJECTION: ICD-10-PCS | Mod: HCNC,S$GLB,, | Performed by: FAMILY MEDICINE

## 2019-05-21 RX ORDER — FLUTICASONE PROPIONATE 50 MCG
SPRAY, SUSPENSION (ML) NASAL
Qty: 32 G | Refills: 11 | Status: SHIPPED | OUTPATIENT
Start: 2019-05-21 | End: 2020-09-20

## 2019-06-11 ENCOUNTER — ANTI-COAG VISIT (OUTPATIENT)
Dept: CARDIOLOGY | Facility: CLINIC | Age: 84
End: 2019-06-11
Payer: MEDICARE

## 2019-06-11 DIAGNOSIS — Z79.01 CURRENT USE OF LONG TERM ANTICOAGULATION: Primary | ICD-10-CM

## 2019-06-11 DIAGNOSIS — I48.20 CHRONIC ATRIAL FIBRILLATION: ICD-10-CM

## 2019-06-11 LAB — INR PPP: 2.8 (ref 2–3)

## 2019-06-11 PROCEDURE — 85610 PROTHROMBIN TIME: CPT | Mod: QW,HCNC,S$GLB, | Performed by: INTERNAL MEDICINE

## 2019-06-11 PROCEDURE — 93793 PR ANTICOAGULANT MGMT FOR PT TAKING WARFARIN: ICD-10-PCS | Mod: HCNC,S$GLB,,

## 2019-06-11 PROCEDURE — 93793 ANTICOAG MGMT PT WARFARIN: CPT | Mod: HCNC,S$GLB,,

## 2019-06-11 PROCEDURE — 85610 POCT INR: ICD-10-PCS | Mod: QW,HCNC,S$GLB, | Performed by: INTERNAL MEDICINE

## 2019-06-11 NOTE — PROGRESS NOTES
INR at goal. Medications and chart reviewed. No changes noted to necessitate adjustment of warfarin or follow-up plan. See calendar.  Pt findings:  Pt states her extremities were red; however, not swollen or itchy.  Pt denies any other changes.  Pt has an upcoming appt w/ PCP and will discuss w/ them.  Will maintain current regimen & re-assess.

## 2019-06-17 ENCOUNTER — CLINICAL SUPPORT (OUTPATIENT)
Dept: INTERNAL MEDICINE | Facility: CLINIC | Age: 84
End: 2019-06-17
Payer: MEDICARE

## 2019-06-17 DIAGNOSIS — J30.9 CHRONIC ALLERGIC RHINITIS: ICD-10-CM

## 2019-06-17 PROCEDURE — 95115 IMMUNOTHERAPY ONE INJECTION: CPT | Mod: HCNC,S$GLB,, | Performed by: FAMILY MEDICINE

## 2019-06-17 PROCEDURE — 99499 NO LOS: ICD-10-PCS | Mod: HCNC,S$GLB,, | Performed by: ALLERGY & IMMUNOLOGY

## 2019-06-17 PROCEDURE — 99499 UNLISTED E&M SERVICE: CPT | Mod: HCNC,S$GLB,, | Performed by: ALLERGY & IMMUNOLOGY

## 2019-06-17 PROCEDURE — 95115 PR IMMUNOTHERAPY, ONE INJECTION: ICD-10-PCS | Mod: HCNC,S$GLB,, | Performed by: FAMILY MEDICINE

## 2019-07-15 ENCOUNTER — CLINICAL SUPPORT (OUTPATIENT)
Dept: INTERNAL MEDICINE | Facility: CLINIC | Age: 84
End: 2019-07-15
Payer: MEDICARE

## 2019-07-15 DIAGNOSIS — J31.0 CHRONIC RHINITIS: ICD-10-CM

## 2019-07-15 PROCEDURE — 99499 UNLISTED E&M SERVICE: CPT | Mod: HCNC,S$GLB,, | Performed by: ALLERGY & IMMUNOLOGY

## 2019-07-15 PROCEDURE — 95115 PR IMMUNOTHERAPY, ONE INJECTION: ICD-10-PCS | Mod: HCNC,S$GLB,, | Performed by: FAMILY MEDICINE

## 2019-07-15 PROCEDURE — 95115 IMMUNOTHERAPY ONE INJECTION: CPT | Mod: HCNC,S$GLB,, | Performed by: FAMILY MEDICINE

## 2019-07-15 PROCEDURE — 99499 NO LOS: ICD-10-PCS | Mod: HCNC,S$GLB,, | Performed by: ALLERGY & IMMUNOLOGY

## 2019-07-17 ENCOUNTER — ANTI-COAG VISIT (OUTPATIENT)
Dept: CARDIOLOGY | Facility: CLINIC | Age: 84
End: 2019-07-17
Payer: MEDICARE

## 2019-07-17 DIAGNOSIS — I48.20 CHRONIC ATRIAL FIBRILLATION: ICD-10-CM

## 2019-07-17 DIAGNOSIS — Z79.01 CURRENT USE OF LONG TERM ANTICOAGULATION: Primary | ICD-10-CM

## 2019-07-17 LAB — INR PPP: 3.9 (ref 2–3)

## 2019-07-17 PROCEDURE — 93793 ANTICOAG MGMT PT WARFARIN: CPT | Mod: HCNC,S$GLB,, | Performed by: PHARMACIST

## 2019-07-17 PROCEDURE — 85610 POCT INR: ICD-10-PCS | Mod: QW,HCNC,S$GLB, | Performed by: INTERNAL MEDICINE

## 2019-07-17 PROCEDURE — 93793 PR ANTICOAGULANT MGMT FOR PT TAKING WARFARIN: ICD-10-PCS | Mod: HCNC,S$GLB,, | Performed by: PHARMACIST

## 2019-07-17 PROCEDURE — 85610 PROTHROMBIN TIME: CPT | Mod: QW,HCNC,S$GLB, | Performed by: INTERNAL MEDICINE

## 2019-07-17 NOTE — PROGRESS NOTES
Patient denies any changes in diet, medications, or health that would effect warfarin therapy. Patient elderly. We will hold her coumadin today then lower her weekly coumadin dose  Patient was re-educated on situations that would require placing a call to the coumadin clinic, including bleeding or unusual bruising issues, changes in health, diet or medications, upcoming procedures that require warfarin interruption, and missed coumadin dose(s). Patient expressed understanding that avoidance of consistency with these parameters could cause fluctuations in INR, leading to more frequent visits and increase risk of adverse events.

## 2019-07-23 DIAGNOSIS — Z79.01 CURRENT USE OF LONG TERM ANTICOAGULATION: ICD-10-CM

## 2019-07-23 DIAGNOSIS — I48.20 CHRONIC ATRIAL FIBRILLATION: ICD-10-CM

## 2019-07-23 RX ORDER — WARFARIN 3 MG/1
TABLET ORAL
Qty: 180 TABLET | Refills: 0 | Status: SHIPPED | OUTPATIENT
Start: 2019-07-23 | End: 2019-10-21 | Stop reason: SDUPTHER

## 2019-07-31 ENCOUNTER — ANTI-COAG VISIT (OUTPATIENT)
Dept: CARDIOLOGY | Facility: CLINIC | Age: 84
End: 2019-07-31
Payer: MEDICARE

## 2019-07-31 DIAGNOSIS — I48.20 CHRONIC ATRIAL FIBRILLATION: ICD-10-CM

## 2019-07-31 DIAGNOSIS — Z79.01 LONG TERM (CURRENT) USE OF ANTICOAGULANTS: Primary | ICD-10-CM

## 2019-07-31 DIAGNOSIS — Z79.01 CURRENT USE OF LONG TERM ANTICOAGULATION: ICD-10-CM

## 2019-07-31 LAB — INR PPP: 3.1 (ref 2–3)

## 2019-07-31 PROCEDURE — 93793 ANTICOAG MGMT PT WARFARIN: CPT | Mod: HCNC,S$GLB,,

## 2019-07-31 PROCEDURE — 85610 POCT INR: ICD-10-PCS | Mod: QW,HCNC,S$GLB, | Performed by: INTERNAL MEDICINE

## 2019-07-31 PROCEDURE — 85610 PROTHROMBIN TIME: CPT | Mod: QW,HCNC,S$GLB, | Performed by: INTERNAL MEDICINE

## 2019-07-31 PROCEDURE — 93793 PR ANTICOAGULANT MGMT FOR PT TAKING WARFARIN: ICD-10-PCS | Mod: HCNC,S$GLB,,

## 2019-07-31 NOTE — PROGRESS NOTES
INR not at goal. Medications, chart, and patient findings reviewed. See calendar for adjustments to dose and follow up plan.  Pt slightly above range.  Findings: Pt states she did not have greens consistently this week & denies any other changes.  Will instruct pt to have an extra serving of greens this week.  Pt was also supratherapeutic last week w/o cause.  Will slightly reduce her dose again & re-assess her in 2 weeks.

## 2019-08-12 ENCOUNTER — TELEPHONE (OUTPATIENT)
Dept: INTERNAL MEDICINE | Facility: CLINIC | Age: 84
End: 2019-08-12

## 2019-08-12 ENCOUNTER — CLINICAL SUPPORT (OUTPATIENT)
Dept: INTERNAL MEDICINE | Facility: CLINIC | Age: 84
End: 2019-08-12
Payer: MEDICARE

## 2019-08-12 DIAGNOSIS — Z12.31 VISIT FOR SCREENING MAMMOGRAM: Primary | ICD-10-CM

## 2019-08-12 DIAGNOSIS — J31.0 CHRONIC RHINITIS: ICD-10-CM

## 2019-08-12 PROCEDURE — 99499 NO LOS: ICD-10-PCS | Mod: HCNC,S$GLB,, | Performed by: ALLERGY & IMMUNOLOGY

## 2019-08-12 PROCEDURE — 95115 IMMUNOTHERAPY ONE INJECTION: CPT | Mod: HCNC,S$GLB,, | Performed by: FAMILY MEDICINE

## 2019-08-12 PROCEDURE — 95115 PR IMMUNOTHERAPY, ONE INJECTION: ICD-10-PCS | Mod: HCNC,S$GLB,, | Performed by: FAMILY MEDICINE

## 2019-08-12 PROCEDURE — 99499 UNLISTED E&M SERVICE: CPT | Mod: HCNC,S$GLB,, | Performed by: ALLERGY & IMMUNOLOGY

## 2019-08-14 ENCOUNTER — ANTI-COAG VISIT (OUTPATIENT)
Dept: CARDIOLOGY | Facility: CLINIC | Age: 84
End: 2019-08-14
Payer: MEDICARE

## 2019-08-14 DIAGNOSIS — Z79.01 LONG TERM (CURRENT) USE OF ANTICOAGULANTS: Primary | ICD-10-CM

## 2019-08-14 DIAGNOSIS — Z79.01 CURRENT USE OF LONG TERM ANTICOAGULATION: ICD-10-CM

## 2019-08-14 DIAGNOSIS — I48.20 CHRONIC ATRIAL FIBRILLATION: ICD-10-CM

## 2019-08-14 LAB — INR PPP: 2.2 (ref 2–3)

## 2019-08-14 PROCEDURE — 93793 ANTICOAG MGMT PT WARFARIN: CPT | Mod: HCNC,S$GLB,,

## 2019-08-14 PROCEDURE — 93793 PR ANTICOAGULANT MGMT FOR PT TAKING WARFARIN: ICD-10-PCS | Mod: HCNC,S$GLB,,

## 2019-08-14 PROCEDURE — 85610 POCT INR: ICD-10-PCS | Mod: QW,HCNC,S$GLB,

## 2019-08-14 PROCEDURE — 85610 PROTHROMBIN TIME: CPT | Mod: QW,HCNC,S$GLB,

## 2019-08-14 NOTE — PROGRESS NOTES
INR at goal. Medications and chart reviewed. No changes noted to necessitate adjustment of warfarin or follow-up plan. See calendar.  Pt findings: Pt is having pain in her lower stomachdaily (she is going to speak to her MD); she continues to have bruising due to use; & she denies any other changes.  Will maintain current regimen & re-assess in 3 weeks.  Pt refused lab & times available in coumadin clinic for recommended 2 week follow-up.

## 2019-09-03 ENCOUNTER — ANTI-COAG VISIT (OUTPATIENT)
Dept: CARDIOLOGY | Facility: CLINIC | Age: 84
End: 2019-09-03
Payer: MEDICARE

## 2019-09-03 DIAGNOSIS — I48.20 CHRONIC ATRIAL FIBRILLATION: ICD-10-CM

## 2019-09-03 DIAGNOSIS — Z79.01 CURRENT USE OF LONG TERM ANTICOAGULATION: ICD-10-CM

## 2019-09-03 DIAGNOSIS — Z79.01 LONG TERM (CURRENT) USE OF ANTICOAGULANTS: Primary | ICD-10-CM

## 2019-09-03 LAB — INR PPP: 2.6 (ref 2–3)

## 2019-09-03 PROCEDURE — 85610 PROTHROMBIN TIME: CPT | Mod: QW,HCNC,S$GLB, | Performed by: INTERNAL MEDICINE

## 2019-09-03 PROCEDURE — 93793 ANTICOAG MGMT PT WARFARIN: CPT | Mod: HCNC,S$GLB,,

## 2019-09-03 PROCEDURE — 93793 PR ANTICOAGULANT MGMT FOR PT TAKING WARFARIN: ICD-10-PCS | Mod: HCNC,S$GLB,,

## 2019-09-03 PROCEDURE — 85610 POCT INR: ICD-10-PCS | Mod: QW,HCNC,S$GLB, | Performed by: INTERNAL MEDICINE

## 2019-09-03 NOTE — PROGRESS NOTES
INR at goal. Medications and chart reviewed. No changes noted to necessitate adjustment of warfarin or follow-up plan. See calendar. Findings: Pt has bruising due to use; her stomach pain has resolved; pt did complain of cramps in her leg; and pt denies any other changes.  Will maintain current dose & re-assess in 4 weeks.

## 2019-09-06 ENCOUNTER — TELEPHONE (OUTPATIENT)
Dept: OPHTHALMOLOGY | Facility: CLINIC | Age: 84
End: 2019-09-06

## 2019-09-06 NOTE — TELEPHONE ENCOUNTER
----- Message from Lilibeth Chandler sent at 9/6/2019 12:28 PM CDT -----  Contact: pt  Reason: Pt states she's experiencing some burning and discharge Please call to advise    Communication: [phone]

## 2019-09-09 ENCOUNTER — HOSPITAL ENCOUNTER (OUTPATIENT)
Dept: RADIOLOGY | Facility: HOSPITAL | Age: 84
Discharge: HOME | End: 2019-09-09
Attending: INTERNAL MEDICINE
Payer: MEDICARE

## 2019-09-09 ENCOUNTER — CLINICAL SUPPORT (OUTPATIENT)
Dept: INTERNAL MEDICINE | Facility: CLINIC | Age: 84
End: 2019-09-09
Payer: MEDICARE

## 2019-09-09 DIAGNOSIS — Z12.31 VISIT FOR SCREENING MAMMOGRAM: ICD-10-CM

## 2019-09-09 DIAGNOSIS — J31.0 CHRONIC RHINITIS: ICD-10-CM

## 2019-09-09 PROCEDURE — 77063 MAMMO DIGITAL SCREENING BILAT WITH TOMOSYNTHESIS_CAD: ICD-10-PCS | Mod: 26,HCNC,, | Performed by: RADIOLOGY

## 2019-09-09 PROCEDURE — 99499 UNLISTED E&M SERVICE: CPT | Mod: HCNC,S$GLB,, | Performed by: ALLERGY & IMMUNOLOGY

## 2019-09-09 PROCEDURE — 99499 NO LOS: ICD-10-PCS | Mod: HCNC,S$GLB,, | Performed by: ALLERGY & IMMUNOLOGY

## 2019-09-09 PROCEDURE — 95115 PR IMMUNOTHERAPY, ONE INJECTION: ICD-10-PCS | Mod: HCNC,S$GLB,, | Performed by: FAMILY MEDICINE

## 2019-09-09 PROCEDURE — 77067 MAMMO DIGITAL SCREENING BILAT WITH TOMOSYNTHESIS_CAD: ICD-10-PCS | Mod: 26,HCNC,, | Performed by: RADIOLOGY

## 2019-09-09 PROCEDURE — 77063 BREAST TOMOSYNTHESIS BI: CPT | Mod: 26,HCNC,, | Performed by: RADIOLOGY

## 2019-09-09 PROCEDURE — 95115 IMMUNOTHERAPY ONE INJECTION: CPT | Mod: HCNC,S$GLB,, | Performed by: FAMILY MEDICINE

## 2019-09-09 PROCEDURE — 77067 SCR MAMMO BI INCL CAD: CPT | Mod: TC,HCNC,PO

## 2019-09-09 PROCEDURE — 77067 SCR MAMMO BI INCL CAD: CPT | Mod: 26,HCNC,, | Performed by: RADIOLOGY

## 2019-09-16 ENCOUNTER — TELEPHONE (OUTPATIENT)
Dept: INTERNAL MEDICINE | Facility: CLINIC | Age: 84
End: 2019-09-16

## 2019-09-16 DIAGNOSIS — N39.0 URINARY TRACT INFECTION WITHOUT HEMATURIA, SITE UNSPECIFIED: Primary | ICD-10-CM

## 2019-09-16 NOTE — TELEPHONE ENCOUNTER
----- Message from Fide Boudreaux sent at 9/16/2019  4:40 PM CDT -----  Contact: self [phone]  Type: Orders Request    What orders/ testing are being requested? U/A    Is there a future appointment scheduled for the patient with PCP? no    When? Urinary burning and itching    Comments: she's coming for an eye apt and is asking if her doctor can place an order for an UA so she can do it on 9/18,    Thank you

## 2019-09-18 ENCOUNTER — OFFICE VISIT (OUTPATIENT)
Dept: OPHTHALMOLOGY | Facility: CLINIC | Age: 84
End: 2019-09-18
Payer: MEDICARE

## 2019-09-18 ENCOUNTER — PATIENT MESSAGE (OUTPATIENT)
Dept: INTERNAL MEDICINE | Facility: CLINIC | Age: 84
End: 2019-09-18

## 2019-09-18 DIAGNOSIS — H34.8122 STABLE CENTRAL RETINAL VEIN OCCLUSION OF LEFT EYE: ICD-10-CM

## 2019-09-18 DIAGNOSIS — H52.7 REFRACTIVE ERROR: ICD-10-CM

## 2019-09-18 DIAGNOSIS — H43.813 POSTERIOR VITREOUS DETACHMENT, BILATERAL: ICD-10-CM

## 2019-09-18 DIAGNOSIS — Z96.1 PSEUDOPHAKIA, BOTH EYES: ICD-10-CM

## 2019-09-18 DIAGNOSIS — H04.123 DRY EYE SYNDROME OF BOTH EYES: Primary | ICD-10-CM

## 2019-09-18 PROCEDURE — 99999 PR PBB SHADOW E&M-EST. PATIENT-LVL II: ICD-10-PCS | Mod: PBBFAC,HCNC,, | Performed by: OPHTHALMOLOGY

## 2019-09-18 PROCEDURE — 99999 PR PBB SHADOW E&M-EST. PATIENT-LVL II: CPT | Mod: PBBFAC,HCNC,, | Performed by: OPHTHALMOLOGY

## 2019-09-18 PROCEDURE — 92014 COMPRE OPH EXAM EST PT 1/>: CPT | Mod: HCNC,S$GLB,, | Performed by: OPHTHALMOLOGY

## 2019-09-18 PROCEDURE — 92014 PR EYE EXAM, EST PATIENT,COMPREHESV: ICD-10-PCS | Mod: HCNC,S$GLB,, | Performed by: OPHTHALMOLOGY

## 2019-09-18 NOTE — PROGRESS NOTES
Subjective:       Patient ID: Jenniffer Jimenez is a 87 y.o. female.    Chief Complaint: Eye Exam    HPI     DSL- 9/18/19 Dr. Scott    88 y/o female is here for routine eye exam. Pt states Va has been blurry.   Pt has itchy and teary eyed. Pt lost her glasses and want prisms included.   Occas floaters.     [facility]  Systane OU QAM      Last edited by Luis Scott MD on 9/18/2019  8:48 AM. (History)               Assessment:       1. Dry eye syndrome of both eyes    2. Stable central retinal vein occlusion of left eye    3. Posterior vitreous detachment, bilateral    4. Refractive error    5. Pseudophakia, both eyes        Plan:       SARAH-Needs more AT's.  H/o CRVO OS-Stable.  PVD's OU-Stable.  RE-Pt wants MRx.        AT's.  Give MRx with 3.0 down OD.  RTC 1 yr.

## 2019-09-19 ENCOUNTER — TELEPHONE (OUTPATIENT)
Dept: INTERNAL MEDICINE | Facility: CLINIC | Age: 84
End: 2019-09-19

## 2019-09-19 NOTE — TELEPHONE ENCOUNTER
----- Message from Alexandra Polo sent at 9/19/2019  1:29 PM CDT -----  Contact: Pt self Mobile [phone]  Patient would like for you to give her a call. She said that she would like for you to let her know what message you sent to her.

## 2019-09-19 NOTE — TELEPHONE ENCOUNTER
----- Message from Faby Gutierrez sent at 9/19/2019  1:26 PM CDT -----  Contact: self/[phone]  Type: Returning a call    Who left a message? Clara     When did the practice call? Today     Comments: Please call and advise.        Thank You

## 2019-09-19 NOTE — TELEPHONE ENCOUNTER
Spoke to pt and informed that culture will be back tomorrow and Dr. Hernandez will prescribe abx based on results. In the meantime advised AZO otc for burning sensation. She verbalized understanding.

## 2019-09-19 NOTE — TELEPHONE ENCOUNTER
----- Message from Bella Sood sent at 9/19/2019  9:51 AM CDT -----  Contact: self   Patient would like to get test results  Name of test (lab, mammo, etc.):     Date of test:  9/18  Ordering provider: Arsen  Where was the test performed:  met  Would the patient rather a call back or a response via [facility]?:  Call back   Comments:

## 2019-09-20 ENCOUNTER — TELEPHONE (OUTPATIENT)
Dept: INTERNAL MEDICINE | Facility: CLINIC | Age: 84
End: 2019-09-20

## 2019-09-20 ENCOUNTER — HOSPITAL ENCOUNTER (OUTPATIENT)
Dept: RADIOLOGY | Facility: HOSPITAL | Age: 84
Discharge: HOME | End: 2019-09-20
Attending: INTERNAL MEDICINE
Payer: MEDICARE

## 2019-09-20 ENCOUNTER — OFFICE VISIT (OUTPATIENT)
Dept: INTERNAL MEDICINE | Facility: CLINIC | Age: 84
End: 2019-09-20
Payer: MEDICARE

## 2019-09-20 VITALS
BODY MASS INDEX: 29.72 KG/M2 | HEART RATE: 66 BPM | SYSTOLIC BLOOD PRESSURE: 132 MMHG | DIASTOLIC BLOOD PRESSURE: 70 MMHG | TEMPERATURE: 99 F | HEIGHT: 66 IN | WEIGHT: 184.94 LBS | OXYGEN SATURATION: 97 %

## 2019-09-20 DIAGNOSIS — R05.9 COUGH: ICD-10-CM

## 2019-09-20 DIAGNOSIS — R30.0 DYSURIA: Primary | ICD-10-CM

## 2019-09-20 DIAGNOSIS — I48.20 CHRONIC ATRIAL FIBRILLATION: ICD-10-CM

## 2019-09-20 DIAGNOSIS — Z79.01 CURRENT USE OF LONG TERM ANTICOAGULATION: ICD-10-CM

## 2019-09-20 LAB
BACTERIA #/AREA URNS AUTO: ABNORMAL /HPF
BILIRUB SERPL-MCNC: NEGATIVE MG/DL
BILIRUB UR QL STRIP: NEGATIVE
BLOOD URINE, POC: NORMAL
CLARITY UR REFRACT.AUTO: ABNORMAL
COLOR UR AUTO: ABNORMAL
COLOR, POC UA: YELLOW
GLUCOSE UR QL STRIP: NEGATIVE
GLUCOSE UR QL STRIP: NORMAL
HGB UR QL STRIP: ABNORMAL
KETONES UR QL STRIP: NEGATIVE
KETONES UR QL STRIP: NEGATIVE
LEUKOCYTE ESTERASE UR QL STRIP: ABNORMAL
LEUKOCYTE ESTERASE URINE, POC: NORMAL
MICROSCOPIC COMMENT: ABNORMAL
NITRITE UR QL STRIP: POSITIVE
NITRITE, POC UA: POSITIVE
PH UR STRIP: 5 [PH] (ref 5–8)
PH, POC UA: 5
PROT UR QL STRIP: NEGATIVE
PROTEIN, POC: NEGATIVE
RBC #/AREA URNS AUTO: 0 /HPF (ref 0–4)
SP GR UR STRIP: 1 (ref 1–1.03)
SPECIFIC GRAVITY, POC UA: 1.01
SQUAMOUS #/AREA URNS AUTO: 1 /HPF
URN SPEC COLLECT METH UR: ABNORMAL
UROBILINOGEN, POC UA: NORMAL
WBC #/AREA URNS AUTO: 42 /HPF (ref 0–5)
WBC CLUMPS UR QL AUTO: ABNORMAL

## 2019-09-20 PROCEDURE — 99999 PR PBB SHADOW E&M-EST. PATIENT-LVL IV: ICD-10-PCS | Mod: PBBFAC,HCNC,, | Performed by: INTERNAL MEDICINE

## 2019-09-20 PROCEDURE — 71046 XR CHEST PA AND LATERAL: ICD-10-PCS | Mod: 26,HCNC,, | Performed by: RADIOLOGY

## 2019-09-20 PROCEDURE — 87077 CULTURE AEROBIC IDENTIFY: CPT | Mod: HCNC

## 2019-09-20 PROCEDURE — 87088 URINE BACTERIA CULTURE: CPT | Mod: HCNC

## 2019-09-20 PROCEDURE — 87186 SC STD MICRODIL/AGAR DIL: CPT | Mod: HCNC

## 2019-09-20 PROCEDURE — 99999 PR PBB SHADOW E&M-EST. PATIENT-LVL IV: CPT | Mod: PBBFAC,HCNC,, | Performed by: INTERNAL MEDICINE

## 2019-09-20 PROCEDURE — 1101F PR PT FALLS ASSESS DOC 0-1 FALLS W/OUT INJ PAST YR: ICD-10-PCS | Mod: HCNC,CPTII,S$GLB, | Performed by: INTERNAL MEDICINE

## 2019-09-20 PROCEDURE — 71046 X-RAY EXAM CHEST 2 VIEWS: CPT | Mod: TC,HCNC

## 2019-09-20 PROCEDURE — 81001 POCT URINALYSIS, DIPSTICK OR TABLET REAGENT, AUTOMATED, WITH MICROSCOP: ICD-10-PCS | Mod: HCNC,S$GLB,, | Performed by: INTERNAL MEDICINE

## 2019-09-20 PROCEDURE — 81001 URINALYSIS AUTO W/SCOPE: CPT | Mod: HCNC

## 2019-09-20 PROCEDURE — 87086 URINE CULTURE/COLONY COUNT: CPT | Mod: HCNC

## 2019-09-20 PROCEDURE — 71046 X-RAY EXAM CHEST 2 VIEWS: CPT | Mod: 26,HCNC,, | Performed by: RADIOLOGY

## 2019-09-20 PROCEDURE — 99214 PR OFFICE/OUTPT VISIT, EST, LEVL IV, 30-39 MIN: ICD-10-PCS | Mod: 25,HCNC,S$GLB, | Performed by: INTERNAL MEDICINE

## 2019-09-20 PROCEDURE — 1101F PT FALLS ASSESS-DOCD LE1/YR: CPT | Mod: HCNC,CPTII,S$GLB, | Performed by: INTERNAL MEDICINE

## 2019-09-20 PROCEDURE — 99214 OFFICE O/P EST MOD 30 MIN: CPT | Mod: 25,HCNC,S$GLB, | Performed by: INTERNAL MEDICINE

## 2019-09-20 PROCEDURE — 81001 URINALYSIS AUTO W/SCOPE: CPT | Mod: HCNC,S$GLB,, | Performed by: INTERNAL MEDICINE

## 2019-09-20 RX ORDER — CIPROFLOXACIN 500 MG/1
500 TABLET ORAL 2 TIMES DAILY
Qty: 14 TABLET | Refills: 0 | Status: SHIPPED | OUTPATIENT
Start: 2019-09-20 | End: 2019-09-27

## 2019-09-20 NOTE — TELEPHONE ENCOUNTER
----- Message from Shadia Hay sent at 9/20/2019  7:48 AM CDT -----  Contact: pt  Pt need to know if she should continue to take thefurosemide (LASIX) 20 MG tablet and potassium chloride (MICRO-K) 10 MEQ CpSR with the ACO medication     Says she is at home today and is not able to get online asked if someone can call her at [phone] or text her on her cell phone at

## 2019-09-20 NOTE — PROGRESS NOTES
Subjective:       Patient ID: Jenniffer Jimenez is a 87 y.o. female.    Chief Complaint: Cough    Here for urgent care -- chronic cough and phlegm and some recent blood in sputum. Always swallowing mucous and feels like post tussive vomiting. Has had chronic coughing and congestion.  Also chronic allergic rhinitis and chronic shots.  What is new today is that she saw scant amount of blood in sputum. She does not think blood came from now. She does use nasonex spray and she is on coumadin for afib.    Also has symptoms of a UTI. Pressure, urinary frequ. Started -- 4 days ago. Dr sent in Cipro today she has not yet started med.    No f/c/ns, has stable breathing. No chest pains.    Review of Systems   Constitutional: Negative for activity change and fatigue.   HENT: Positive for congestion, postnasal drip and rhinorrhea.    Respiratory: Positive for cough.    Cardiovascular: Positive for leg swelling (mild stable). Negative for chest pain and palpitations.   Gastrointestinal: Negative for abdominal distention and abdominal pain.   Genitourinary: Positive for frequency and urgency. Negative for decreased urine volume.   Skin: Negative for rash and wound.       Objective:      Physical Exam   Constitutional: She is oriented to person, place, and time. She appears well-developed and well-nourished. No distress.   Not toxic appearing   HENT:   Head: Normocephalic and atraumatic.   Mouth/Throat: No oropharyngeal exudate.   sinuses nontender, nasal mucosa w/o purulence, but L nares with mucosal irritation which appears like an area with recent bleeding   Eyes: Pupils are equal, round, and reactive to light. EOM are normal. No scleral icterus.   Neck: Normal range of motion. Neck supple. No thyromegaly present.   Cardiovascular: Normal rate and regular rhythm.   Pulmonary/Chest: Effort normal. No stridor. No respiratory distress. She has wheezes. She has no rales. She exhibits no tenderness.   Mild crackles at the  based.  Wheezes throughout   Abdominal: Soft. Bowel sounds are normal.   No CVAT   Musculoskeletal: She exhibits edema (tr).   Lymphadenopathy:     She has no cervical adenopathy.   Neurological: She is alert and oriented to person, place, and time.   Skin: She is not diaphoretic.   Psychiatric: She has a normal mood and affect. Her behavior is normal.       Assessment:       1. Dysuria    2. Chronic atrial fibrillation    3. Current use of long term anticoagulation    4. Cough        Plan:       Jenniffer was seen today for cough.    Diagnoses and all orders for this visit:    Dysuria  -     Urinalysis  -     Urine culture  -     POCT urinalysis, dipstick or tablet reag  Take cipro and rxed    Chronic atrial fibrillation  Good rate control    Current use of long term anticoagulation  -     Protime-INR; Future  Since she saw scant blood in sputum chk inr    Cough  -     X-Ray Chest PA And Lateral; Future  I explained that she is wheezy, I offered an inhaler, she declines. Has never had one. She does not have clear hx of asthma or copd.  Will ensure no pna.  Explained that if not better in 1-2 weeks, pt should rtc/call PCP    Blood in mucous could be from L nares nose bleed, discussed stopping nasal steroid spray.        Other orders  -     Urinalysis Microscopic      Addendum -- reviewed with her that CXR does not show pna.

## 2019-09-20 NOTE — TELEPHONE ENCOUNTER
"Spoke to pt and informed of uti and abx sent to pharm. Pt was also informed to contact coumadin clinic regarding meds.     Pt stated that sometime she coughs up blood, sometimes sputum is brown. Pt had one coughing spell on phone and stated "yea, it's red"   "

## 2019-09-21 ENCOUNTER — NURSE TRIAGE (OUTPATIENT)
Dept: ADMINISTRATIVE | Facility: CLINIC | Age: 84
End: 2019-09-21

## 2019-09-21 NOTE — TELEPHONE ENCOUNTER
"Patient calling to see if coumadin dosage needs to be adjusted today as a result of recent labs. She would also like to report to md that she is currently taking cipro. spoke to on call provider, Dr. De Leon. She states patient can take her normal dose tonight and tomorrow and contact coumadin clinic on Monday to see if dosage should be adjusted and inform them that she is now on Cipro. Patient advised and voices understanding. Please contact caller directly to discuss any further care advice.    Reason for Disposition   Caller has urgent medication question about med that PCP prescribed and triager unable to answer question    Additional Information   Negative: Drug overdose and nurse unable to answer question   Negative: Caller requesting information not related to medicine   Negative: Caller requesting a prescription for Strep throat and has a positive culture result   Negative: Rash while taking a medication or within 3 days of stopping it   Negative: Immunization reaction suspected   Negative: [1] Asthma AND [2] having symptoms of asthma (cough, wheezing, etc)   Negative: MORE THAN A DOUBLE DOSE of a prescription or over-the-counter (OTC) drug   Negative: [1] DOUBLE DOSE (an extra dose or lesser amount) of over-the-counter (OTC) drug AND [2] any symptoms (e.g., dizziness, nausea, pain, sleepiness)   Negative: [1] DOUBLE DOSE (an extra dose or lesser amount) of prescription drug AND [2] any symptoms (e.g., dizziness, nausea, pain, sleepiness)   Negative: Took another person's prescription drug   Negative: Pharmacy calling with prescription questions and triager unable to answer question   Negative: [1] Prescription not at pharmacy AND [2] was prescribed today by PCP   Negative: [1] Request for URGENT new prescription or refill of "essential" medication (i.e., likelihood of harm to patient if not taken) AND [2] triager unable to fill per unit policy   Negative: Diabetes drug error or overdose (e.g., " insulin or extra dose)   Negative: [1] DOUBLE DOSE (an extra dose or lesser amount) of prescription drug AND [2] NO symptoms (Exception: a double dose of antibiotics)    Protocols used: MEDICATION QUESTION CALL-A-AH

## 2019-09-23 ENCOUNTER — ANTI-COAG VISIT (OUTPATIENT)
Dept: CARDIOLOGY | Facility: CLINIC | Age: 84
End: 2019-09-23

## 2019-09-23 DIAGNOSIS — Z79.01 CURRENT USE OF LONG TERM ANTICOAGULATION: ICD-10-CM

## 2019-09-23 DIAGNOSIS — I48.20 CHRONIC ATRIAL FIBRILLATION: ICD-10-CM

## 2019-09-23 LAB
BACTERIA UR CULT: ABNORMAL
INR PPP: 1.8

## 2019-09-23 NOTE — PROGRESS NOTES
Patient called 09/23/19 to inform us that she had coughed up (Phelgm) small appearance of blood in it.Patient started on (Cipro 500mg) 1 tablet x2 times daily for 7 days started on 09/21/19. She also took on 9/19/19 (Azo) 1 tablets only. Please advise.

## 2019-09-24 ENCOUNTER — TELEPHONE (OUTPATIENT)
Dept: DERMATOLOGY | Facility: CLINIC | Age: 84
End: 2019-09-24

## 2019-09-24 ENCOUNTER — ANTI-COAG VISIT (OUTPATIENT)
Dept: CARDIOLOGY | Facility: CLINIC | Age: 84
End: 2019-09-24
Payer: MEDICARE

## 2019-09-24 DIAGNOSIS — I48.20 CHRONIC ATRIAL FIBRILLATION: ICD-10-CM

## 2019-09-24 DIAGNOSIS — Z79.01 CURRENT USE OF LONG TERM ANTICOAGULATION: Primary | ICD-10-CM

## 2019-09-24 LAB — INR PPP: 2.7 (ref 2–3)

## 2019-09-24 PROCEDURE — 85610 POCT INR: ICD-10-PCS | Mod: QW,HCNC,S$GLB, | Performed by: INTERNAL MEDICINE

## 2019-09-24 PROCEDURE — 93793 PR ANTICOAGULANT MGMT FOR PT TAKING WARFARIN: ICD-10-PCS | Mod: HCNC,S$GLB,, | Performed by: PHARMACIST

## 2019-09-24 PROCEDURE — 93793 ANTICOAG MGMT PT WARFARIN: CPT | Mod: HCNC,S$GLB,, | Performed by: PHARMACIST

## 2019-09-24 PROCEDURE — 85610 PROTHROMBIN TIME: CPT | Mod: QW,HCNC,S$GLB, | Performed by: INTERNAL MEDICINE

## 2019-09-24 NOTE — PROGRESS NOTES
Patient started a course of CIPRO on 9/21 and has 3 days remaining. INR on higher end of range and moved up from last week. It could be the possible cipro interaction. Patient is elderly so we will lower her coumadin slightly for today then resume her previous stable dose with close follow up. Patient was re-educated on situations that would require placing a call to the coumadin clinic, including bleeding or unusual bruising issues, changes in health, diet or medications, upcoming procedures that require warfarin interruption, and missed coumadin dose(s). Patient expressed understanding that avoidance of consistency with these parameters could cause fluctuations in INR, leading to more frequent visits and increase risk of adverse events.

## 2019-09-25 ENCOUNTER — TELEPHONE (OUTPATIENT)
Dept: INTERNAL MEDICINE | Facility: CLINIC | Age: 84
End: 2019-09-25

## 2019-09-25 ENCOUNTER — OFFICE VISIT (OUTPATIENT)
Dept: INTERNAL MEDICINE | Facility: CLINIC | Age: 84
End: 2019-09-25
Payer: MEDICARE

## 2019-09-25 VITALS
OXYGEN SATURATION: 96 % | DIASTOLIC BLOOD PRESSURE: 74 MMHG | HEIGHT: 66 IN | TEMPERATURE: 98 F | SYSTOLIC BLOOD PRESSURE: 116 MMHG | WEIGHT: 187.38 LBS | BODY MASS INDEX: 30.11 KG/M2 | HEART RATE: 93 BPM

## 2019-09-25 DIAGNOSIS — R60.0 PERIPHERAL EDEMA: Primary | ICD-10-CM

## 2019-09-25 PROCEDURE — 99999 PR PBB SHADOW E&M-EST. PATIENT-LVL III: ICD-10-PCS | Mod: PBBFAC,HCNC,, | Performed by: INTERNAL MEDICINE

## 2019-09-25 PROCEDURE — 1101F PT FALLS ASSESS-DOCD LE1/YR: CPT | Mod: HCNC,CPTII,S$GLB, | Performed by: INTERNAL MEDICINE

## 2019-09-25 PROCEDURE — 1101F PR PT FALLS ASSESS DOC 0-1 FALLS W/OUT INJ PAST YR: ICD-10-PCS | Mod: HCNC,CPTII,S$GLB, | Performed by: INTERNAL MEDICINE

## 2019-09-25 PROCEDURE — 99213 PR OFFICE/OUTPT VISIT, EST, LEVL III, 20-29 MIN: ICD-10-PCS | Mod: HCNC,S$GLB,, | Performed by: INTERNAL MEDICINE

## 2019-09-25 PROCEDURE — 99999 PR PBB SHADOW E&M-EST. PATIENT-LVL III: CPT | Mod: PBBFAC,HCNC,, | Performed by: INTERNAL MEDICINE

## 2019-09-25 PROCEDURE — 99213 OFFICE O/P EST LOW 20 MIN: CPT | Mod: HCNC,S$GLB,, | Performed by: INTERNAL MEDICINE

## 2019-09-25 NOTE — PROGRESS NOTES
Subjective:       Patient ID: Jenniffer Jimenez is a 87 y.o. female.    Chief Complaint: Leg Swelling (R x2 days )    87 year old lady reports that her legs swelled up after eating ham.  She took an extra lasix and legs went down some, left more than right, but right is always bigger than left.      Review of Systems   Constitutional: Negative for activity change, chills, fatigue and fever.   HENT: Negative for congestion, ear pain, nosebleeds, postnasal drip, sinus pressure and sore throat.    Eyes: Negative.  Negative for visual disturbance.   Respiratory: Negative for cough, chest tightness, shortness of breath and wheezing.    Cardiovascular: Negative for chest pain.   Gastrointestinal: Negative for abdominal pain, diarrhea, nausea and vomiting.   Genitourinary: Negative for difficulty urinating, dysuria, frequency and urgency.   Musculoskeletal: Negative for arthralgias and neck stiffness.   Skin: Negative for rash.   Neurological: Negative for dizziness, weakness and headaches.   Psychiatric/Behavioral: Negative for sleep disturbance. The patient is not nervous/anxious.        Objective:      Physical Exam   Musculoskeletal:        Legs:      Assessment:       1. Peripheral edema        Plan:   Jenniffer was seen today for leg swelling.    Diagnoses and all orders for this visit:    Peripheral edema

## 2019-09-25 NOTE — TELEPHONE ENCOUNTER
----- Message from Naida Bruce sent at 9/25/2019  1:42 PM CDT -----  Contact: Patient [phone]  Stated that her right leg is swollen. Took an extra Lasix last night but it is still swollen.    Please call and advise.    Thank You

## 2019-09-25 NOTE — TELEPHONE ENCOUNTER
Spoke to pt and informed that she should be evaluated for right leg swelling per Dr. Abreu. Pt reported taking two lasix yesterday and only one today. She wanted to know if se should take another. Booked apt for this afternoon.

## 2019-09-26 NOTE — PROGRESS NOTES
Subjective:       Patient ID: Jenniffer Jimenez is a 87 y.o. female.    Chief Complaint: Leg Swelling; Spasms (bilateral leg); Hand Problem (dicoloration in fingers); and Pain (sporadic pain, sharp, usually to hand area, stated it is in her vein)    Patient is a 87 y.o.female who presents today for follow up       Labs: due now  Vaccines: up to date  Gyn: hysterectomy  Mammo: sept 2019  Dexa: July 2018  Colon: declines for now  Eye: nov 2018      1. Right leg swells intermittently; worse at night when lying in bed. She also gets a cramp to the right leg as well. She gets up to walk to help the cramp resolve. She hasn't been exercising as much recently.      Review of Systems   Constitutional: Negative for appetite change, chills, diaphoresis and fever.   HENT: Negative for congestion, ear discharge, ear pain, postnasal drip, tinnitus, trouble swallowing and voice change.    Eyes: Negative for discharge, redness and itching.   Respiratory: Negative for cough, chest tightness, shortness of breath and wheezing.    Cardiovascular: Positive for leg swelling. Negative for chest pain and palpitations.   Gastrointestinal: Negative for abdominal pain, constipation, diarrhea, nausea and vomiting.   Endocrine: Negative for cold intolerance and heat intolerance.   Genitourinary: Negative for difficulty urinating, flank pain, hematuria and urgency.   Musculoskeletal: Positive for arthralgias and myalgias. Negative for gait problem and neck stiffness.   Skin: Negative for color change and rash.   Neurological: Positive for dizziness. Negative for seizures, syncope and headaches.   Hematological: Negative for adenopathy.   Psychiatric/Behavioral: Negative for agitation, behavioral problems, confusion and sleep disturbance. The patient is nervous/anxious.        Objective:      Physical Exam   Constitutional: She is oriented to person, place, and time. She appears well-developed and well-nourished. No distress.   HENT:   Head:  Normocephalic and atraumatic.   Right Ear: External ear normal.   Left Ear: External ear normal.   Nose: Nose normal.   Mouth/Throat: Oropharynx is clear and moist. No oropharyngeal exudate.   Eyes: Pupils are equal, round, and reactive to light. Conjunctivae and EOM are normal. Right eye exhibits no discharge. Left eye exhibits no discharge. No scleral icterus.   Neck: Neck supple. No JVD present. No tracheal deviation present. No thyromegaly present.   Cardiovascular: Normal rate, normal heart sounds and intact distal pulses. Exam reveals no gallop and no friction rub.   No murmur heard.  Pulmonary/Chest: Effort normal and breath sounds normal. No stridor. No respiratory distress. She has no wheezes. She has no rales. She exhibits no tenderness.   Abdominal: Soft. Bowel sounds are normal. She exhibits no distension. There is no tenderness. There is no rebound.   Musculoskeletal: She exhibits no edema or tenderness.   Lymphadenopathy:     She has no cervical adenopathy.   Neurological: She is alert and oriented to person, place, and time.   Skin: Skin is warm and dry. No rash noted. She is not diaphoretic. No erythema.   Psychiatric: She has a normal mood and affect. Her behavior is normal.   Nursing note and vitals reviewed.      Assessment and Plan:       1. Chronic diastolic heart failure    - CBC auto differential; Future  - Comprehensive metabolic panel; Future  - Hemoglobin A1c; Future  - Lipid panel; Future  - TSH; Future  - Urinalysis; Future    2. Chronic atrial fibrillation    - CBC auto differential; Future  - Comprehensive metabolic panel; Future  - Hemoglobin A1c; Future  - Lipid panel; Future  - TSH; Future  - Urinalysis; Future    3. Atherosclerosis of aorta    - CBC auto differential; Future  - Comprehensive metabolic panel; Future  - Hemoglobin A1c; Future  - Lipid panel; Future  - TSH; Future  - Urinalysis; Future    4. Pure hypercholesterolemia    - CBC auto differential; Future  - Comprehensive  metabolic panel; Future  - Hemoglobin A1c; Future  - Lipid panel; Future  - TSH; Future  - Urinalysis; Future    5. Chronic kidney disease, stage III (moderate)    - CBC auto differential; Future  - Comprehensive metabolic panel; Future  - Hemoglobin A1c; Future  - Lipid panel; Future  - TSH; Future  - Urinalysis; Future    6. Prediabetes  - diet controlled  - CBC auto differential; Future  - Comprehensive metabolic panel; Future  - Hemoglobin A1c; Future  - Lipid panel; Future  - TSH; Future  - Urinalysis; Future    7. Leg swelling  - CV US Lower Extremity Veins Bilateral Insufficiency; Future  - COMPRESSION STOCKINGS    8. Leg cramps  - Comprehensive metabolic panel; Future  - Magnesium; Future    9. Urge incontinence  - Urine culture; Future  - Ambulatory Referral to Urology    10. Edema, unspecified type  - trial of compression stockings; low salt diet  - CV US Lower Extremity Veins Bilateral Insufficiency; Future          No follow-ups on file.

## 2019-09-30 ENCOUNTER — PATIENT OUTREACH (OUTPATIENT)
Dept: ADMINISTRATIVE | Facility: OTHER | Age: 84
End: 2019-09-30

## 2019-10-02 ENCOUNTER — ANTI-COAG VISIT (OUTPATIENT)
Dept: CARDIOLOGY | Facility: CLINIC | Age: 84
End: 2019-10-02
Payer: MEDICARE

## 2019-10-02 DIAGNOSIS — Z79.01 CURRENT USE OF LONG TERM ANTICOAGULATION: ICD-10-CM

## 2019-10-02 DIAGNOSIS — Z79.01 LONG TERM (CURRENT) USE OF ANTICOAGULANTS: Primary | ICD-10-CM

## 2019-10-02 DIAGNOSIS — I48.20 CHRONIC ATRIAL FIBRILLATION: ICD-10-CM

## 2019-10-02 LAB — INR PPP: 2.9 (ref 2–3)

## 2019-10-02 PROCEDURE — 85610 PROTHROMBIN TIME: CPT | Mod: QW,HCNC,S$GLB, | Performed by: INTERNAL MEDICINE

## 2019-10-02 PROCEDURE — 93793 PR ANTICOAGULANT MGMT FOR PT TAKING WARFARIN: ICD-10-PCS | Mod: HCNC,S$GLB,,

## 2019-10-02 PROCEDURE — 85610 POCT INR: ICD-10-PCS | Mod: QW,HCNC,S$GLB, | Performed by: INTERNAL MEDICINE

## 2019-10-02 PROCEDURE — 93793 ANTICOAG MGMT PT WARFARIN: CPT | Mod: HCNC,S$GLB,,

## 2019-10-02 NOTE — PROGRESS NOTES
INR at goal. Medications and chart reviewed. No changes noted to necessitate adjustment of warfarin or follow-up plan. See calendar.  Findings: Pt states her cat scratched her causing her to bleed; she has bruising to due to use; she had some diarrhea due to eating some strange foods; she's d/c'd her cipro; & denies any other changes.  Maintain current regimen & re-assess in 3 weeks. Reviewed diet w/ pt.   Patient was re-educated on situations that would require placing a call to the Coumadin Clinic, including bleeding or unusual bruising issues, changes in health, diet or medications,upcoming procedures that require warfarin interruption, and missed Coumadin dose(s). Patient expressed understanding that avoidance of consistency with these parameters could cause fluctuations in INR, leading to more frequent visits and increase risk of adverse events.

## 2019-10-03 ENCOUNTER — OFFICE VISIT (OUTPATIENT)
Dept: INTERNAL MEDICINE | Facility: CLINIC | Age: 84
End: 2019-10-03
Payer: MEDICARE

## 2019-10-03 ENCOUNTER — CLINICAL SUPPORT (OUTPATIENT)
Dept: CARDIOLOGY | Facility: CLINIC | Age: 84
End: 2019-10-03
Attending: INTERNAL MEDICINE
Payer: MEDICARE

## 2019-10-03 ENCOUNTER — LAB VISIT (OUTPATIENT)
Dept: LAB | Facility: HOSPITAL | Age: 84
End: 2019-10-03
Attending: INTERNAL MEDICINE
Payer: MEDICARE

## 2019-10-03 ENCOUNTER — OFFICE VISIT (OUTPATIENT)
Dept: DERMATOLOGY | Facility: CLINIC | Age: 84
End: 2019-10-03
Payer: MEDICARE

## 2019-10-03 VITALS
HEART RATE: 76 BPM | WEIGHT: 185.19 LBS | SYSTOLIC BLOOD PRESSURE: 124 MMHG | TEMPERATURE: 99 F | RESPIRATION RATE: 16 BRPM | DIASTOLIC BLOOD PRESSURE: 60 MMHG | HEIGHT: 66 IN | BODY MASS INDEX: 29.76 KG/M2

## 2019-10-03 VITALS — WEIGHT: 185 LBS | BODY MASS INDEX: 29.86 KG/M2

## 2019-10-03 DIAGNOSIS — I50.32 CHRONIC DIASTOLIC HEART FAILURE: ICD-10-CM

## 2019-10-03 DIAGNOSIS — R73.03 PREDIABETES: ICD-10-CM

## 2019-10-03 DIAGNOSIS — M79.89 LEG SWELLING: ICD-10-CM

## 2019-10-03 DIAGNOSIS — L21.9 SEBORRHEIC DERMATITIS: ICD-10-CM

## 2019-10-03 DIAGNOSIS — I48.20 CHRONIC ATRIAL FIBRILLATION: ICD-10-CM

## 2019-10-03 DIAGNOSIS — N18.30 CHRONIC KIDNEY DISEASE, STAGE III (MODERATE): ICD-10-CM

## 2019-10-03 DIAGNOSIS — I70.0 ATHEROSCLEROSIS OF AORTA: ICD-10-CM

## 2019-10-03 DIAGNOSIS — N39.41 URGE INCONTINENCE: ICD-10-CM

## 2019-10-03 DIAGNOSIS — E78.00 PURE HYPERCHOLESTEROLEMIA: ICD-10-CM

## 2019-10-03 DIAGNOSIS — I50.32 CHRONIC DIASTOLIC HEART FAILURE: Primary | ICD-10-CM

## 2019-10-03 DIAGNOSIS — R60.9 EDEMA, UNSPECIFIED TYPE: ICD-10-CM

## 2019-10-03 DIAGNOSIS — L82.1 SEBORRHEIC KERATOSES: Primary | ICD-10-CM

## 2019-10-03 DIAGNOSIS — R25.2 LEG CRAMPS: ICD-10-CM

## 2019-10-03 DIAGNOSIS — R20.9 DISTURBANCE OF SKIN SENSATION: ICD-10-CM

## 2019-10-03 LAB
ALBUMIN SERPL BCP-MCNC: 4.2 G/DL (ref 3.5–5.2)
ALP SERPL-CCNC: 134 U/L (ref 55–135)
ALT SERPL W/O P-5'-P-CCNC: 16 U/L (ref 10–44)
ANION GAP SERPL CALC-SCNC: 10 MMOL/L (ref 8–16)
AST SERPL-CCNC: 22 U/L (ref 10–40)
BASOPHILS # BLD AUTO: 0.03 K/UL (ref 0–0.2)
BASOPHILS NFR BLD: 0.6 % (ref 0–1.9)
BILIRUB SERPL-MCNC: 0.9 MG/DL (ref 0.1–1)
BUN SERPL-MCNC: 20 MG/DL (ref 8–23)
CALCIUM SERPL-MCNC: 9.8 MG/DL (ref 8.7–10.5)
CHLORIDE SERPL-SCNC: 101 MMOL/L (ref 95–110)
CHOLEST SERPL-MCNC: 218 MG/DL (ref 120–199)
CHOLEST/HDLC SERPL: 2.7 {RATIO} (ref 2–5)
CO2 SERPL-SCNC: 27 MMOL/L (ref 23–29)
CREAT SERPL-MCNC: 1 MG/DL (ref 0.5–1.4)
DIFFERENTIAL METHOD: ABNORMAL
EOSINOPHIL # BLD AUTO: 0.1 K/UL (ref 0–0.5)
EOSINOPHIL NFR BLD: 1.5 % (ref 0–8)
ERYTHROCYTE [DISTWIDTH] IN BLOOD BY AUTOMATED COUNT: 15 % (ref 11.5–14.5)
EST. GFR  (AFRICAN AMERICAN): 58.5 ML/MIN/1.73 M^2
EST. GFR  (NON AFRICAN AMERICAN): 50.8 ML/MIN/1.73 M^2
ESTIMATED AVG GLUCOSE: 126 MG/DL (ref 68–131)
GLUCOSE SERPL-MCNC: 110 MG/DL (ref 70–110)
HBA1C MFR BLD HPLC: 6 % (ref 4–5.6)
HCT VFR BLD AUTO: 38.8 % (ref 37–48.5)
HDLC SERPL-MCNC: 82 MG/DL (ref 40–75)
HDLC SERPL: 37.6 % (ref 20–50)
HGB BLD-MCNC: 12.5 G/DL (ref 12–16)
IMM GRANULOCYTES # BLD AUTO: 0.01 K/UL (ref 0–0.04)
IMM GRANULOCYTES NFR BLD AUTO: 0.2 % (ref 0–0.5)
LDLC SERPL CALC-MCNC: 117.4 MG/DL (ref 63–159)
LEFT GREAT SAPHENOUS DISTAL THIGH DIA: 0.32 CM
LEFT GREAT SAPHENOUS JUNCTION DIA: 0.47 CM
LEFT GREAT SAPHENOUS MIDDLE THIGH DIA: 0.47 CM
LEFT SMALL SAPHENOUS KNEE DIA: 0.23 CM
LEFT SMALL SAPHENOUS SPJ DIA: 0.18 CM
LYMPHOCYTES # BLD AUTO: 1 K/UL (ref 1–4.8)
LYMPHOCYTES NFR BLD: 20.7 % (ref 18–48)
MAGNESIUM SERPL-MCNC: 2.2 MG/DL (ref 1.6–2.6)
MCH RBC QN AUTO: 29.8 PG (ref 27–31)
MCHC RBC AUTO-ENTMCNC: 32.2 G/DL (ref 32–36)
MCV RBC AUTO: 93 FL (ref 82–98)
MONOCYTES # BLD AUTO: 0.4 K/UL (ref 0.3–1)
MONOCYTES NFR BLD: 7.9 % (ref 4–15)
NEUTROPHILS # BLD AUTO: 3.3 K/UL (ref 1.8–7.7)
NEUTROPHILS NFR BLD: 69.1 % (ref 38–73)
NONHDLC SERPL-MCNC: 136 MG/DL
NRBC BLD-RTO: 0 /100 WBC
PLATELET # BLD AUTO: 232 K/UL (ref 150–350)
PMV BLD AUTO: 10.9 FL (ref 9.2–12.9)
POTASSIUM SERPL-SCNC: 4 MMOL/L (ref 3.5–5.1)
PROT SERPL-MCNC: 7.7 G/DL (ref 6–8.4)
RBC # BLD AUTO: 4.19 M/UL (ref 4–5.4)
RIGHT GREAT SAPHENOUS DISTAL THIGH DIA: 0.37 CM
RIGHT GREAT SAPHENOUS JUNCTION DIA: 0.66 CM
RIGHT GREAT SAPHENOUS KNEE DIA: 0.22 CM
RIGHT GREAT SAPHENOUS KNEE REFLUX: 2214 MS
RIGHT GREAT SAPHENOUS MIDDLE THIGH DIA: 0.33 CM
RIGHT GREAT SAPHENOUS PROXIMAL CALF DIA: 0.28 CM
RIGHT SMALL SAPHENOUS KNEE DIA: 0.23 CM
RIGHT SMALL SAPHENOUS SPJ DIA: 0.4 CM
SODIUM SERPL-SCNC: 138 MMOL/L (ref 136–145)
TRIGL SERPL-MCNC: 93 MG/DL (ref 30–150)
TSH SERPL DL<=0.005 MIU/L-ACNC: 2.16 UIU/ML (ref 0.4–4)
WBC # BLD AUTO: 4.79 K/UL (ref 3.9–12.7)

## 2019-10-03 PROCEDURE — 99999 PR PBB SHADOW E&M-EST. PATIENT-LVL III: ICD-10-PCS | Mod: PBBFAC,HCNC,, | Performed by: INTERNAL MEDICINE

## 2019-10-03 PROCEDURE — 80061 LIPID PANEL: CPT | Mod: HCNC

## 2019-10-03 PROCEDURE — 84443 ASSAY THYROID STIM HORMONE: CPT | Mod: HCNC

## 2019-10-03 PROCEDURE — 1101F PT FALLS ASSESS-DOCD LE1/YR: CPT | Mod: HCNC,CPTII,S$GLB, | Performed by: DERMATOLOGY

## 2019-10-03 PROCEDURE — 99999 PR PBB SHADOW E&M-EST. PATIENT-LVL III: CPT | Mod: PBBFAC,HCNC,, | Performed by: INTERNAL MEDICINE

## 2019-10-03 PROCEDURE — 1101F PR PT FALLS ASSESS DOC 0-1 FALLS W/OUT INJ PAST YR: ICD-10-PCS | Mod: HCNC,CPTII,S$GLB, | Performed by: DERMATOLOGY

## 2019-10-03 PROCEDURE — 85025 COMPLETE CBC W/AUTO DIFF WBC: CPT | Mod: HCNC

## 2019-10-03 PROCEDURE — 36415 COLL VENOUS BLD VENIPUNCTURE: CPT | Mod: HCNC,PO

## 2019-10-03 PROCEDURE — 17110 PR DESTRUCTION BENIGN LESIONS UP TO 14: ICD-10-PCS | Mod: HCNC,S$GLB,, | Performed by: DERMATOLOGY

## 2019-10-03 PROCEDURE — 99999 PR PBB SHADOW E&M-EST. PATIENT-LVL II: ICD-10-PCS | Mod: PBBFAC,HCNC,, | Performed by: DERMATOLOGY

## 2019-10-03 PROCEDURE — 1101F PT FALLS ASSESS-DOCD LE1/YR: CPT | Mod: HCNC,CPTII,S$GLB, | Performed by: INTERNAL MEDICINE

## 2019-10-03 PROCEDURE — 93970 EXTREMITY STUDY: CPT | Mod: HCNC,S$GLB,, | Performed by: INTERNAL MEDICINE

## 2019-10-03 PROCEDURE — 83735 ASSAY OF MAGNESIUM: CPT | Mod: HCNC

## 2019-10-03 PROCEDURE — 1101F PR PT FALLS ASSESS DOC 0-1 FALLS W/OUT INJ PAST YR: ICD-10-PCS | Mod: HCNC,CPTII,S$GLB, | Performed by: INTERNAL MEDICINE

## 2019-10-03 PROCEDURE — 99213 OFFICE O/P EST LOW 20 MIN: CPT | Mod: 25,HCNC,S$GLB, | Performed by: DERMATOLOGY

## 2019-10-03 PROCEDURE — 99214 PR OFFICE/OUTPT VISIT, EST, LEVL IV, 30-39 MIN: ICD-10-PCS | Mod: HCNC,S$GLB,, | Performed by: INTERNAL MEDICINE

## 2019-10-03 PROCEDURE — 99999 PR PBB SHADOW E&M-EST. PATIENT-LVL II: CPT | Mod: PBBFAC,HCNC,, | Performed by: DERMATOLOGY

## 2019-10-03 PROCEDURE — 83036 HEMOGLOBIN GLYCOSYLATED A1C: CPT | Mod: HCNC

## 2019-10-03 PROCEDURE — 80053 COMPREHEN METABOLIC PANEL: CPT | Mod: HCNC

## 2019-10-03 PROCEDURE — 17110 DESTRUCTION B9 LES UP TO 14: CPT | Mod: HCNC,S$GLB,, | Performed by: DERMATOLOGY

## 2019-10-03 PROCEDURE — 93970 CV US LOWER VENOUS INSUFFICIENCY BILATERAL (CUPID ONLY): ICD-10-PCS | Mod: HCNC,S$GLB,, | Performed by: INTERNAL MEDICINE

## 2019-10-03 PROCEDURE — 99214 OFFICE O/P EST MOD 30 MIN: CPT | Mod: HCNC,S$GLB,, | Performed by: INTERNAL MEDICINE

## 2019-10-03 PROCEDURE — 99213 PR OFFICE/OUTPT VISIT, EST, LEVL III, 20-29 MIN: ICD-10-PCS | Mod: 25,HCNC,S$GLB, | Performed by: DERMATOLOGY

## 2019-10-03 NOTE — MEDICAL/APP STUDENT
Subjective:       Patient ID: Jenniffer Jimenez is a 87 y.o. female.    Chief Complaint: Leg Swelling; Spasms (bilateral leg); Hand Problem (dicoloration in fingers); and Pain (sporadic pain, sharp, usually to hand area, stated it is in her vein)    Ms Jenniffer Jimenez is an 86 yo F here for complaints of leg swelling and cramping. Swelling occurs intermittently, worse at night, and associated with cramping that wakes her up from sleep a couple times per week. She has to get up and walk around the house before symptoms resolve. Right leg worse than left. She has been seen previously for this problem and instructed to take an extra Lasix. She consumes a balanced diet and exercises on a stationary bike. She lives alone in her house with a cat.      Review of Systems   Constitutional: Negative for activity change, appetite change, chills, fatigue and fever.   HENT: Positive for congestion and postnasal drip. Negative for sinus pain and sore throat.    Eyes: Negative for pain and discharge.   Respiratory: Positive for cough. Negative for chest tightness and shortness of breath.    Cardiovascular: Positive for leg swelling. Negative for chest pain.   Gastrointestinal: Negative for abdominal distention, abdominal pain, diarrhea, nausea and vomiting.   Genitourinary: Positive for urgency. Negative for difficulty urinating, dysuria and frequency.   Musculoskeletal: Positive for arthralgias and gait problem. Negative for joint swelling and myalgias.   Skin: Negative for rash and wound.   Neurological: Positive for dizziness and numbness. Negative for headaches.   Hematological: Bruises/bleeds easily.   Psychiatric/Behavioral: Negative for agitation and behavioral problems.        Positive for memory problems       Objective:      Physical Exam   Constitutional: She is oriented to person, place, and time. She appears well-developed and well-nourished.   HENT:   Head: Normocephalic.   Eyes: Pupils are equal, round, and reactive  to light.   Neck: Normal range of motion. Neck supple.   Cardiovascular: Normal rate, regular rhythm and normal heart sounds.   Pulmonary/Chest: Effort normal and breath sounds normal.   Abdominal: Soft. Bowel sounds are normal.   Neurological: She is alert and oriented to person, place, and time.   Skin: Skin is warm and dry.   Psychiatric: She has a normal mood and affect. Her behavior is normal.       Assessment:       Ms Jenniffer Jimenez is an 88 yo F here today for leg swelling.      Plan:       Leg swelling/cramping  To start wearing compression stockings  CMP to test electrolytes  Continue taking Lasix as prescribed  Continue regular exercise

## 2019-10-03 NOTE — PROGRESS NOTES
Subjective:       Patient ID:  Jenniffer Jimenez is a 87 y.o. female who presents for   Chief Complaint   Patient presents with    Lesion     scalp     Irritated lesion right scalp present for several months, growing not painful no tx.    Lesion  - Initial  Affected locations: face and scalp  Aggravated by: nothing  Relieving factors/Treatments tried: nothing        Review of Systems   Constitutional: Negative for fever, chills, weight loss, weight gain, fatigue, night sweats and malaise.   Skin: Positive for rash, daily sunscreen use, activity-related sunscreen use and wears hat.   Hematologic/Lymphatic: Bruises/bleeds easily.        Objective:    Physical Exam   Constitutional: She appears well-developed and well-nourished. No distress.   Neurological: She is alert and oriented to person, place, and time. She is not disoriented.   Psychiatric: She has a normal mood and affect.   Skin:   Areas Examined (abnormalities noted in diagram):   Scalp / Hair Palpated and Inspected  Head / Face Inspection Performed  Neck Inspection Performed  RUE Inspected  LUE Inspection Performed  Nails and Digits Inspection Performed                   Diagram Legend     Erythematous scaling macule/papule c/w actinic keratosis       Vascular papule c/w angioma      Pigmented verrucoid papule/plaque c/w seborrheic keratosis      Yellow umbilicated papule c/w sebaceous hyperplasia      Irregularly shaped tan macule c/w lentigo     1-2 mm smooth white papules consistent with Milia      Movable subcutaneous cyst with punctum c/w epidermal inclusion cyst      Subcutaneous movable cyst c/w pilar cyst      Firm pink to brown papule c/w dermatofibroma      Pedunculated fleshy papule(s) c/w skin tag(s)      Evenly pigmented macule c/w junctional nevus     Mildly variegated pigmented, slightly irregular-bordered macule c/w mildly atypical nevus      Flesh colored to evenly pigmented papule c/w intradermal nevus       Pink pearly papule/plaque  c/w basal cell carcinoma      Erythematous hyperkeratotic cursted plaque c/w SCC      Surgical scar with no sign of skin cancer recurrence      Open and closed comedones      Inflammatory papules and pustules      Verrucoid papule consistent consistent with wart     Erythematous eczematous patches and plaques     Dystrophic onycholytic nail with subungual debris c/w onychomycosis     Umbilicated papule    Erythematous-base heme-crusted tan verrucoid plaque consistent with inflamed seborrheic keratosis     Erythematous Silvery Scaling Plaque c/w Psoriasis     See annotation      Assessment / Plan:        Seborrheic keratoses  Cryosurgery Procedure Note    Cryosurgery procedure note:    Verbal consent from the patient is obtained including, but not limited to, risk of hypopigmentation/hyperpigmentation, scar, recurrence of lesion. Liquid nitrogen cryosurgery is applied to 1 lesion to produce a freeze injury, is instructed to call if lesions do not completely resolve.        Seborrheic dermatitis/irritant dermatitis right chin  brihali lotion until clear                   Return 2 months for skin check

## 2019-10-04 ENCOUNTER — PATIENT MESSAGE (OUTPATIENT)
Dept: INTERNAL MEDICINE | Facility: CLINIC | Age: 84
End: 2019-10-04

## 2019-10-07 ENCOUNTER — PATIENT MESSAGE (OUTPATIENT)
Dept: INTERNAL MEDICINE | Facility: CLINIC | Age: 84
End: 2019-10-07

## 2019-10-07 ENCOUNTER — CLINICAL SUPPORT (OUTPATIENT)
Dept: INTERNAL MEDICINE | Facility: CLINIC | Age: 84
End: 2019-10-07
Payer: MEDICARE

## 2019-10-07 DIAGNOSIS — J31.0 CHRONIC RHINITIS: ICD-10-CM

## 2019-10-07 PROCEDURE — 99499 NO LOS: ICD-10-PCS | Mod: HCNC,S$GLB,, | Performed by: ALLERGY & IMMUNOLOGY

## 2019-10-07 PROCEDURE — 99499 UNLISTED E&M SERVICE: CPT | Mod: HCNC,S$GLB,, | Performed by: ALLERGY & IMMUNOLOGY

## 2019-10-07 PROCEDURE — 95115 IMMUNOTHERAPY ONE INJECTION: CPT | Mod: HCNC,S$GLB,, | Performed by: FAMILY MEDICINE

## 2019-10-07 PROCEDURE — 95115 PR IMMUNOTHERAPY, ONE INJECTION: ICD-10-PCS | Mod: HCNC,S$GLB,, | Performed by: FAMILY MEDICINE

## 2019-10-13 ENCOUNTER — PATIENT MESSAGE (OUTPATIENT)
Dept: INTERNAL MEDICINE | Facility: CLINIC | Age: 84
End: 2019-10-13

## 2019-10-21 DIAGNOSIS — Z79.01 CURRENT USE OF LONG TERM ANTICOAGULATION: ICD-10-CM

## 2019-10-21 DIAGNOSIS — I48.20 CHRONIC ATRIAL FIBRILLATION: ICD-10-CM

## 2019-10-21 RX ORDER — WARFARIN 3 MG/1
TABLET ORAL
Qty: 140 TABLET | Refills: 3 | Status: SHIPPED | OUTPATIENT
Start: 2019-10-21 | End: 2019-12-20 | Stop reason: SDUPTHER

## 2019-10-21 RX ORDER — WARFARIN 3 MG/1
TABLET ORAL
Qty: 180 TABLET | Refills: 0 | OUTPATIENT
Start: 2019-10-21

## 2019-10-23 ENCOUNTER — ANTI-COAG VISIT (OUTPATIENT)
Dept: CARDIOLOGY | Facility: CLINIC | Age: 84
End: 2019-10-23
Payer: MEDICARE

## 2019-10-23 DIAGNOSIS — Z79.01 CURRENT USE OF LONG TERM ANTICOAGULATION: ICD-10-CM

## 2019-10-23 DIAGNOSIS — Z79.01 LONG TERM (CURRENT) USE OF ANTICOAGULANTS: Primary | ICD-10-CM

## 2019-10-23 DIAGNOSIS — I48.20 CHRONIC ATRIAL FIBRILLATION: ICD-10-CM

## 2019-10-23 LAB — INR PPP: 2.6 (ref 2–3)

## 2019-10-23 PROCEDURE — 85610 PROTHROMBIN TIME: CPT | Mod: QW,HCNC,S$GLB, | Performed by: INTERNAL MEDICINE

## 2019-10-23 PROCEDURE — 93793 ANTICOAG MGMT PT WARFARIN: CPT | Mod: HCNC,S$GLB,,

## 2019-10-23 PROCEDURE — 93793 PR ANTICOAGULANT MGMT FOR PT TAKING WARFARIN: ICD-10-PCS | Mod: HCNC,S$GLB,,

## 2019-10-23 PROCEDURE — 85610 POCT INR: ICD-10-PCS | Mod: QW,HCNC,S$GLB, | Performed by: INTERNAL MEDICINE

## 2019-10-23 NOTE — PROGRESS NOTES
INR at goal. Medications and chart reviewed. No changes noted to necessitate adjustment of warfarin or follow-up plan. See calendar.  Maintain current regimen & re-assess in 4 weeks. Pt denies any changes.   Patient was re-educated on situations that would require placing a call to the Coumadin Clinic, including bleeding or unusual bruising issues, changes in health, diet or medications,upcoming procedures that require warfarin interruption, and missed Coumadin dose(s). Patient expressed understanding that avoidance of consistency with these parameters could cause fluctuations in INR, leading to more frequent visits and increase risk of adverse events.

## 2019-11-04 ENCOUNTER — CLINICAL SUPPORT (OUTPATIENT)
Dept: INTERNAL MEDICINE | Facility: CLINIC | Age: 84
End: 2019-11-04
Payer: MEDICARE

## 2019-11-04 ENCOUNTER — OFFICE VISIT (OUTPATIENT)
Dept: UROLOGY | Facility: CLINIC | Age: 84
End: 2019-11-04
Payer: MEDICARE

## 2019-11-04 VITALS
HEIGHT: 66 IN | SYSTOLIC BLOOD PRESSURE: 155 MMHG | DIASTOLIC BLOOD PRESSURE: 69 MMHG | BODY MASS INDEX: 29.83 KG/M2 | WEIGHT: 185.63 LBS | HEART RATE: 68 BPM

## 2019-11-04 DIAGNOSIS — N39.0 RECURRENT UTI: ICD-10-CM

## 2019-11-04 DIAGNOSIS — J30.9 CHRONIC ALLERGIC RHINITIS: ICD-10-CM

## 2019-11-04 DIAGNOSIS — N32.81 OAB (OVERACTIVE BLADDER): ICD-10-CM

## 2019-11-04 PROCEDURE — 99204 PR OFFICE/OUTPT VISIT, NEW, LEVL IV, 45-59 MIN: ICD-10-PCS | Mod: HCNC,S$GLB,, | Performed by: UROLOGY

## 2019-11-04 PROCEDURE — 95115 PR IMMUNOTHERAPY, ONE INJECTION: ICD-10-PCS | Mod: HCNC,S$GLB,, | Performed by: FAMILY MEDICINE

## 2019-11-04 PROCEDURE — 99499 UNLISTED E&M SERVICE: CPT | Mod: HCNC,S$GLB,, | Performed by: ALLERGY & IMMUNOLOGY

## 2019-11-04 PROCEDURE — 95115 IMMUNOTHERAPY ONE INJECTION: CPT | Mod: HCNC,S$GLB,, | Performed by: FAMILY MEDICINE

## 2019-11-04 PROCEDURE — 99999 PR PBB SHADOW E&M-EST. PATIENT-LVL III: CPT | Mod: PBBFAC,HCNC,, | Performed by: UROLOGY

## 2019-11-04 PROCEDURE — 99499 NO LOS: ICD-10-PCS | Mod: HCNC,S$GLB,, | Performed by: ALLERGY & IMMUNOLOGY

## 2019-11-04 PROCEDURE — 1101F PT FALLS ASSESS-DOCD LE1/YR: CPT | Mod: HCNC,CPTII,S$GLB, | Performed by: UROLOGY

## 2019-11-04 PROCEDURE — 99999 PR PBB SHADOW E&M-EST. PATIENT-LVL III: ICD-10-PCS | Mod: PBBFAC,HCNC,, | Performed by: UROLOGY

## 2019-11-04 PROCEDURE — 1101F PR PT FALLS ASSESS DOC 0-1 FALLS W/OUT INJ PAST YR: ICD-10-PCS | Mod: HCNC,CPTII,S$GLB, | Performed by: UROLOGY

## 2019-11-04 PROCEDURE — 99204 OFFICE O/P NEW MOD 45 MIN: CPT | Mod: HCNC,S$GLB,, | Performed by: UROLOGY

## 2019-11-04 RX ORDER — OXYBUTYNIN CHLORIDE 5 MG/1
5 TABLET ORAL DAILY PRN
Qty: 90 TABLET | Refills: 0 | Status: SHIPPED | OUTPATIENT
Start: 2019-11-04 | End: 2020-06-16

## 2019-11-04 NOTE — PROGRESS NOTES
CC: increased urgency and urge incontinence, recurrent UTI    Jenniffer Jimenez is a 87 y.o. woman who is here for the evaluation of No chief complaint on file.    A new pt referred by her PCP, Rubi Young,    Had E. Coli UTI back in Sept 2019 and was treated with cipro.  Had a repeat urine culture on 10/3/19.  Has worn depends for over 7 years but never had an accidents.  Now c/o urgency and urge incontinence.  She had a hysterectomy at age 35, which was complicated by bowel injury.  Since her hysterectomy, she was told to have a small bladder capacity.  Has been going to bathroom a lot, 15 to 20 x a day.  Urination symptoms: Positive for frequency, urgency, nocturia 1 to 2 x and urge incontinence.  Denies flank pain, dysuria, hematuria     She is active and lives with a cat.  His son lives in [place] doing a research.   to a chinese woman.  Former smoker, quit smoking at age 42.      Past Medical History:   Diagnosis Date    Amblyopia of left eye 4/10/2013    Anticoagulant long-term use     Coumadin - A-Fib    Anxiety     Arthritis     Facet arthropathy, Lumbosacral    Atherosclerosis of aorta     Atrial fibrillation     Auricular fibrillation     Central retinal vein occlusion of left eye     CHF (congestive heart failure)     Chronic kidney disease, stage 3     Coronary artery disease     Depression     Diastolic heart failure 8/20/2014    Exotropia of both eyes 2/6/2013    recession RSR 5.0mm w/ adj; recession LR os 5.0 w/ adj; resect MR os  4.0mm    Hearing loss     History of resection of small bowel     Hyperlipidemia     Hypertension     Hypertensive retinopathy of both eyes     Hypoglycemia     Macular degeneration     Meniere's disease of both ears     OA (osteoarthritis) of shoulder     Right    Osteoporosis     Other and unspecified hyperlipidemia     Posterior vitreous detachment of both eyes     Rhinitis     TIA (transient ischemic attack)      Past Surgical  History:   Procedure Laterality Date    APPENDECTOMY      CARDIAC CATHETERIZATION      CATARACT EXTRACTION W/  INTRAOCULAR LENS IMPLANT Bilateral      SECTION, CLASSIC      HYSTERECTOMY      INNER EAR SURGERY      JOINT REPLACEMENT      LEFT KNEE REPLACEMENT IN 2013 -    OOPHORECTOMY      SINUS SURGERY      STRABISMUS SURGERY  13    RSR recession 5 mm, LLR recession 5 mm and LMR resection 4mm    STRABISMUS SURGERY  2014    recess LR OD 6mm    TONSILLECTOMY       Social History     Tobacco Use    Smoking status: Former Smoker     Types: Cigarettes     Last attempt to quit: 10/29/1982     Years since quittin.0    Smokeless tobacco: Never Used   Substance Use Topics    Alcohol use: Yes     Alcohol/week: 0.0 standard drinks     Comment: socially    Drug use: No     Family History   Problem Relation Age of Onset    Hypertension Mother     Hypertension Father     Liver disease Sister     Diabetes Sister     Heart disease Sister     Diabetes Brother     Breast cancer Maternal Aunt     No Known Problems Maternal Uncle     No Known Problems Paternal Aunt     No Known Problems Paternal Uncle     No Known Problems Maternal Grandmother     No Known Problems Maternal Grandfather     No Known Problems Paternal Grandmother     No Known Problems Paternal Grandfather     No Known Problems Daughter     No Known Problems Son     Heart disease Sister     No Known Problems Son     No Known Problems Son     Cancer Neg Hx         in first degree relatives    Melanoma Neg Hx     Psoriasis Neg Hx     Lupus Neg Hx     Amblyopia Neg Hx     Blindness Neg Hx     Cataracts Neg Hx     Glaucoma Neg Hx     Macular degeneration Neg Hx     Retinal detachment Neg Hx     Strabismus Neg Hx     Stroke Neg Hx     Thyroid disease Neg Hx      Allergy:  Review of patient's allergies indicates:   Allergen Reactions    Bactrim [sulfamethoxazole-trimethoprim] Other (See Comments)      ""Couldn't walk"    Opioids - morphine analogues Other (See Comments)     Bowel issues; bowel obstruction    Tizanidine Other (See Comments)     "Lips were numb,  Almost passed out."    Tramadol Hallucinations    Beta-blockers (beta-adrenergic blocking agts) Other (See Comments)     Can not go on beta blockers for long period of time - due to taking allergy injections    Phenergan [promethazine] Other (See Comments)     Per pt. request- saw sisters have bad reaction to drug     Outpatient Encounter Medications as of 11/4/2019   Medication Sig Dispense Refill    acetaminophen (TYLENOL) 500 MG tablet Take 1,000 mg by mouth 2 (two) times daily as needed for Pain. Stated that per Cards, she can take only two doses per day prn      fluticasone propionate (FLONASE) 50 mcg/actuation nasal spray USE 1 SPRAY IN EACH NOSTRIL ONE TIME DAILY 32 g 11    furosemide (LASIX) 20 MG tablet TAKE 1 TABLET(20 MG) BY MOUTH EVERY DAY EXTRA TAB PRN FOR SWELLING 100 tablet 3    furosemide (LASIX) 20 MG tablet TAKE 1 TABLET(20 MG) BY MOUTH EVERY DAY 90 tablet 3    mupirocin (BACTROBAN) 2 % ointment Apply topically 3 (three) times daily. 30 g 1    mupirocin (BACTROBAN) 2 % ointment Apply to affected area 3 times daily 22 g 1    peg 400-propylene glycol (SYSTANE) 0.4-0.3 % Drop Place 1-2 drops into both eyes as needed.       potassium chloride (MICRO-K) 10 MEQ CpSR TAKE 1 CAPSULE(10 MEQ) BY MOUTH EVERY DAY 90 capsule 3    pravastatin (PRAVACHOL) 40 MG tablet Take 1 tablet (40 mg total) by mouth 2 (two) times daily. 180 tablet 3    triamcinolone (NASACORT) 55 mcg nasal inhaler 2 sprays by Nasal route once daily. 17 g 0    warfarin (COUMADIN) 3 MG tablet 4.5mg PO daily - OR AS DIRECTED BY COUMADIN CLINIC 140 tablet 3    oxybutynin (DITROPAN) 5 MG Tab Take 1 tablet (5 mg total) by mouth daily as needed. 90 tablet 0     Facility-Administered Encounter Medications as of 11/4/2019   Medication Dose Route Frequency Provider Last Rate " Last Dose    Allergy Mix   Subcutaneous 1 time in Clinic/HOD Harriet Franco MD        Allergy Mix   Subcutaneous 1 time in Clinic/HOD Srinivas Castro MD         Review of Systems   ROS  Physical Exam     Vitals:    11/04/19 0900   BP: (!) 155/69   Pulse: 68     Physical Exam   Constitutional: She is oriented to person, place, and time. She appears well-developed and well-nourished. No distress.   HENT:   Head: Normocephalic and atraumatic.   Right Ear: External ear normal.   Left Ear: External ear normal.   Nose: Nose normal.   Eyes: Conjunctivae and EOM are normal. Pupils are equal, round, and reactive to light. No scleral icterus.   Neck: Normal range of motion. Neck supple. No JVD present. No tracheal deviation present. No thyromegaly present.   Cardiovascular: Normal rate, regular rhythm, normal heart sounds and intact distal pulses.  Exam reveals no gallop and no friction rub.    No murmur heard.  Pulmonary/Chest: Effort normal and breath sounds normal. No respiratory distress. She has no wheezes.   Abdominal: Soft. Bowel sounds are normal. She exhibits no distension and no mass. There is no tenderness. There is no rebound and no guarding.   Genitourinary: No vaginal discharge found.   Genitourinary Comments: Atrophic vagina   Musculoskeletal: Normal range of motion. She exhibits no edema, tenderness or deformity.   Lymphadenopathy:     She has no cervical adenopathy.   Neurological: She is alert and oriented to person, place, and time.   Skin: Skin is warm and dry. She is not diaphoretic.     Psychiatric: She has a normal mood and affect. Her behavior is normal. Thought content normal.       LABS:  Lab Results   Component Value Date    CREATININE 1.0 10/03/2019    CREATININE 1.0 02/02/2019    CREATININE 0.9 12/03/2018     Urine Culture, Routine   Date Value Ref Range Status   10/03/2019 No growth  Final   09/20/2019 ESCHERICHIA COLI  >100,000 cfu/ml   (A)  Final     Hemoglobin A1C   Date Value Ref  Range Status   10/03/2019 6.0 (H) 4.0 - 5.6 % Final     Comment:     ADA Screening Guidelines:  5.7-6.4%  Consistent with prediabetes  >or=6.5%  Consistent with diabetes  High levels of fetal hemoglobin interfere with the HbA1C  assay. Heterozygous hemoglobin variants (HbS, HgC, etc)do  not significantly interfere with this assay.   However, presence of multiple variants may affect accuracy.     12/03/2018 5.9 (H) 4.0 - 5.6 % Final     Comment:     ADA Screening Guidelines:  5.7-6.4%  Consistent with prediabetes  >or=6.5%  Consistent with diabetes  High levels of fetal hemoglobin interfere with the HbA1C  assay. Heterozygous hemoglobin variants (HbS, HgC, etc)do  not significantly interfere with this assay.   However, presence of multiple variants may affect accuracy.       UA    Assessment and Plan:  Diagnoses and all orders for this visit:    OAB (overactive bladder)  -     oxybutynin (DITROPAN) 5 MG Tab; Take 1 tablet (5 mg total) by mouth daily as needed.    Recurrent UTI    has been taking cranberry juice and Probiotics  Watch for low salt diet.  Fluid management as she has done well.  She does not want to take additional medication regularly for her OAB .  However, she likes to try a medication for her activity trip as needed.  So we decided to try oxybutynin daily prn.  Potential side effects explained.  All questions answered.    Follow-up:  Follow up if symptoms worsen or fail to improve.

## 2019-11-04 NOTE — LETTER
November 4, 2019      Rubi Young,   [address] [facility] [address] [facility] [address]  [place] [address]           [place] [facility]  [address]  [facility] [address]  Phone: [phone]          Patient: Jenniffer Jimenez   MR Number: [id number]   YOB: 1932   Date of Visit: 11/4/2019       Dear Dr. Rubi Young:    Thank you for referring Jenniffer Jimenez to me for evaluation. Attached you will find relevant portions of my assessment and plan of care.    If you have questions, please do not hesitate to call me. I look forward to following Jenniffer Jimenez along with you.    Sincerely,    David Roe MD    Enclosure  CC:  No Recipients    If you would like to receive this communication electronically, please contact [email or web address] or [phone] to request more information on [facility] access.    For providers and/or their staff who would like to refer a patient to [facility], please contact us through our one-stop-shop provider referral line, [facility] , at [phone].    If you feel you have received this communication in error or would no longer like to receive these types of communications, please e-mail [email or web address]

## 2019-11-13 ENCOUNTER — OFFICE VISIT (OUTPATIENT)
Dept: DERMATOLOGY | Facility: CLINIC | Age: 84
End: 2019-11-13
Payer: MEDICARE

## 2019-11-13 VITALS — BODY MASS INDEX: 29.86 KG/M2 | WEIGHT: 185 LBS

## 2019-11-13 DIAGNOSIS — I87.2 VENOUS STASIS DERMATITIS OF LEFT LOWER EXTREMITY: Primary | ICD-10-CM

## 2019-11-13 DIAGNOSIS — L71.9 ROSACEA: ICD-10-CM

## 2019-11-13 DIAGNOSIS — L82.1 SEBORRHEIC KERATOSES: ICD-10-CM

## 2019-11-13 PROCEDURE — 99999 PR PBB SHADOW E&M-EST. PATIENT-LVL III: CPT | Mod: PBBFAC,HCNC,, | Performed by: DERMATOLOGY

## 2019-11-13 PROCEDURE — 99213 OFFICE O/P EST LOW 20 MIN: CPT | Mod: HCNC,S$GLB,, | Performed by: DERMATOLOGY

## 2019-11-13 PROCEDURE — 99213 PR OFFICE/OUTPT VISIT, EST, LEVL III, 20-29 MIN: ICD-10-PCS | Mod: HCNC,S$GLB,, | Performed by: DERMATOLOGY

## 2019-11-13 PROCEDURE — 1101F PT FALLS ASSESS-DOCD LE1/YR: CPT | Mod: HCNC,CPTII,S$GLB, | Performed by: DERMATOLOGY

## 2019-11-13 PROCEDURE — 99999 PR PBB SHADOW E&M-EST. PATIENT-LVL III: ICD-10-PCS | Mod: PBBFAC,HCNC,, | Performed by: DERMATOLOGY

## 2019-11-13 PROCEDURE — 1101F PR PT FALLS ASSESS DOC 0-1 FALLS W/OUT INJ PAST YR: ICD-10-PCS | Mod: HCNC,CPTII,S$GLB, | Performed by: DERMATOLOGY

## 2019-11-13 NOTE — PROGRESS NOTES
"  Subjective:       Patient ID:  Jenniffer Jimenez is a 87 y.o. female who presents for   Chief Complaint   Patient presents with    Lesion     face, bilateral legs      See note 2015;  : See outside notes Dr Coller [facility], has lesion on left leg for several years not painful had bx and showed scar and stasis dermatitis. She relates the lesion was resolved for a while this year but now recurred.    Has recurred again, she has "moved the crust off" and it is irritated. Relates same area has come and gone for about 20 years.     Lesion  - Initial  Affected locations: face, right lower leg, left lower leg, left upper leg and right upper leg  Signs / symptoms: asymptomatic  Aggravated by: nothing  Relieving factors/Treatments tried: nothing        Review of Systems   Constitutional: Negative for fever, chills, weight loss, weight gain, fatigue, night sweats and malaise.   Skin: Positive for daily sunscreen use, activity-related sunscreen use and wears hat.   Hematologic/Lymphatic: Bruises/bleeds easily.        Objective:    Physical Exam   Constitutional: She appears well-developed and well-nourished. No distress.   Neurological: She is alert and oriented to person, place, and time. She is not disoriented.   Psychiatric: She has a normal mood and affect.   Skin:   Areas Examined (abnormalities noted in diagram):   Scalp / Hair Palpated and Inspected  Head / Face Inspection Performed  Neck Inspection Performed  RUE Inspected  LUE Inspection Performed  Nails and Digits Inspection Performed                   Diagram Legend     Erythematous scaling macule/papule c/w actinic keratosis       Vascular papule c/w angioma      Pigmented verrucoid papule/plaque c/w seborrheic keratosis      Yellow umbilicated papule c/w sebaceous hyperplasia      Irregularly shaped tan macule c/w lentigo     1-2 mm smooth white papules consistent with Milia      Movable subcutaneous cyst with punctum c/w epidermal inclusion cyst      " Subcutaneous movable cyst c/w pilar cyst      Firm pink to brown papule c/w dermatofibroma      Pedunculated fleshy papule(s) c/w skin tag(s)      Evenly pigmented macule c/w junctional nevus     Mildly variegated pigmented, slightly irregular-bordered macule c/w mildly atypical nevus      Flesh colored to evenly pigmented papule c/w intradermal nevus       Pink pearly papule/plaque c/w basal cell carcinoma      Erythematous hyperkeratotic cursted plaque c/w SCC      Surgical scar with no sign of skin cancer recurrence      Open and closed comedones      Inflammatory papules and pustules      Verrucoid papule consistent consistent with wart     Erythematous eczematous patches and plaques     Dystrophic onycholytic nail with subungual debris c/w onychomycosis     Umbilicated papule    Erythematous-base heme-crusted tan verrucoid plaque consistent with inflamed seborrheic keratosis     Erythematous Silvery Scaling Plaque c/w Psoriasis     See annotation      Assessment / Plan:        Venous stasis dermatitis of left lower extremity  See bx report outside record  Will use vaseline on band aid and should be improved 1-2 weeks  Call if recurs    Seborrheic keratoses  reassurance      Rosacea  Asking about laser for telangiectasias             No follow-ups on file.

## 2019-11-14 ENCOUNTER — TELEPHONE (OUTPATIENT)
Dept: DERMATOLOGY | Facility: CLINIC | Age: 84
End: 2019-11-14

## 2019-11-14 NOTE — TELEPHONE ENCOUNTER
"----- Message from Kaur Headley sent at 11/14/2019  9:29 AM CST -----  Contact: CASTILLO ARREDONDO [[id number]]  Name of Who is Calling: CASTILLO ARREDONDO [[id number]]      What is the request in detail:   Patient called requesting to schedule a consult for cosmetic (s) / botox. 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 Green ONLY."  Please give a call back at your earliest convenience and further advise.      THANKS!      Reply by [facility]: no      Call Back : CASTILLO ARREDONDO / # [phone]                                    "
Called PT and scheduled botox appointment.   
ingestion

## 2019-11-15 ENCOUNTER — PROCEDURE VISIT (OUTPATIENT)
Dept: DERMATOLOGY | Facility: CLINIC | Age: 84
End: 2019-11-15
Payer: MEDICARE

## 2019-11-15 DIAGNOSIS — I78.1 TELANGIECTASIA: ICD-10-CM

## 2019-11-15 DIAGNOSIS — L71.9 ROSACEA: Primary | ICD-10-CM

## 2019-11-15 PROCEDURE — 99213 PR OFFICE/OUTPT VISIT, EST, LEVL III, 20-29 MIN: ICD-10-PCS | Mod: S$GLB,,, | Performed by: DERMATOLOGY

## 2019-11-15 PROCEDURE — 99213 OFFICE O/P EST LOW 20 MIN: CPT | Mod: S$GLB,,, | Performed by: DERMATOLOGY

## 2019-11-15 NOTE — PROGRESS NOTES
Subjective:       Patient ID:  Jenniffer Jimenez is a 87 y.o. female who presents for   Chief Complaint   Patient presents with    Skin Discoloration     face, years, red     Skin Discoloration  - Initial  Affected locations: face  Duration: 10 years  Signs / symptoms: redness  Severity: mild  Timing: constant  Aggravated by: stress  Treatments tried: soolantra.  Improvement on treatment: mild      Review of Systems   HENT: Negative for headaches.    Eyes: Negative for itching, eye watering, eye irritation and eyelid inflammation.   Gastrointestinal: Negative for nausea, vomiting, diarrhea and Sensitivity to oral antibiotics.   Skin: Positive for activity-related sunscreen use. Negative for daily sunscreen use and recent sunburn.   Neurological: Negative for headaches.        Objective:    Physical Exam   Constitutional: She appears well-developed and well-nourished. No distress.   HENT:   Mouth/Throat: Lips normal.    Eyes: Lids are normal.  No conjunctival no injection.   Neurological: She is alert and oriented to person, place, and time. She is not disoriented.   Psychiatric: She has a normal mood and affect.   Skin:   Areas Examined (abnormalities noted in diagram):   Head / Face Inspection Performed  Neck Inspection Performed              Diagram Legend     Erythematous scaling macule/papule c/w actinic keratosis       Vascular papule c/w angioma      Pigmented verrucoid papule/plaque c/w seborrheic keratosis      Yellow umbilicated papule c/w sebaceous hyperplasia      Irregularly shaped tan macule c/w lentigo     1-2 mm smooth white papules consistent with Milia      Movable subcutaneous cyst with punctum c/w epidermal inclusion cyst      Subcutaneous movable cyst c/w pilar cyst      Firm pink to brown papule c/w dermatofibroma      Pedunculated fleshy papule(s) c/w skin tag(s)      Evenly pigmented macule c/w junctional nevus     Mildly variegated pigmented, slightly irregular-bordered macule c/w  mildly atypical nevus      Flesh colored to evenly pigmented papule c/w intradermal nevus       Pink pearly papule/plaque c/w basal cell carcinoma      Erythematous hyperkeratotic cursted plaque c/w SCC      Surgical scar with no sign of skin cancer recurrence      Open and closed comedones      Inflammatory papules and pustules      Verrucoid papule consistent consistent with wart     Erythematous eczematous patches and plaques     Dystrophic onycholytic nail with subungual debris c/w onychomycosis     Umbilicated papule    Erythematous-base heme-crusted tan verrucoid plaque consistent with inflamed seborrheic keratosis     Erythematous Silvery Scaling Plaque c/w Psoriasis     See annotation      Assessment / Plan:        Rosacea  Telangiectasia  Discussed that rosacea is a chronic condition without a definitive cure.  There are several well-known triggers, such as exercise, temperature extremes, alcohol, and spicy foods, that should be avoided to prevent a rosacea flare.  Recommended and discussed risks, benefits, alternatives, and side effects of pulse dye laser (PDL).      Follow up for laser.

## 2019-11-18 ENCOUNTER — TELEPHONE (OUTPATIENT)
Dept: DERMATOLOGY | Facility: CLINIC | Age: 84
End: 2019-11-18

## 2019-11-18 NOTE — TELEPHONE ENCOUNTER
----- Message from Ana Maria Chau sent at 11/18/2019  8:15 AM CST -----  Contact: [place] [phone]  Type: Patient Call Back    Who called:Jenniffer     What is the request in detail: the patient is requesting a call back from Ms. Orosco. The patient states that she forgot she to notify the staff that she has congestive heart failure, AFIB& she is coumadin. The patient also has another question to ask in regards to her procedure.    Can the clinic reply by [facility]?no    Would the patient rather a call back or a response via [facility]? Call back    Best call back number:[phone]

## 2019-11-18 NOTE — TELEPHONE ENCOUNTER
Called PT and informed her that it is ok to lia laser with her conditions that she listed. Told PT that her and Dr. MCHUGH can discuss any cosmetic tx during her appointment in December.

## 2019-11-18 NOTE — TELEPHONE ENCOUNTER
Called PT in regards to answering questions she has about laser. LVM for PT to call back at their convenience.

## 2019-11-19 ENCOUNTER — TELEPHONE (OUTPATIENT)
Dept: DERMATOLOGY | Facility: CLINIC | Age: 84
End: 2019-11-19

## 2019-11-19 NOTE — TELEPHONE ENCOUNTER
Called PT and informed her that she should be able to cover any redness and or bruising with make up

## 2019-11-19 NOTE — TELEPHONE ENCOUNTER
----- Message from Andrew De Leon sent at 11/19/2019  8:53 AM CST -----  Contact: CASTILLO ARREDONDO [[id number]]  Name of Who is Calling: CASTILLO ARREDONDO [[id number]]      What is the request in detail: Would like to speak with staff in regards to reschedule her laser appointment. Please advise      Can the clinic reply by [facility]: no      What Number to Call Back if not in [facility]: [phone]

## 2019-11-20 ENCOUNTER — ANTI-COAG VISIT (OUTPATIENT)
Dept: CARDIOLOGY | Facility: CLINIC | Age: 84
End: 2019-11-20
Payer: MEDICARE

## 2019-11-20 DIAGNOSIS — Z79.01 LONG TERM (CURRENT) USE OF ANTICOAGULANTS: Primary | ICD-10-CM

## 2019-11-20 DIAGNOSIS — Z79.01 CURRENT USE OF LONG TERM ANTICOAGULATION: ICD-10-CM

## 2019-11-20 DIAGNOSIS — I48.20 CHRONIC ATRIAL FIBRILLATION: ICD-10-CM

## 2019-11-20 LAB — INR PPP: 2.4 (ref 2–3)

## 2019-11-20 PROCEDURE — 85610 POCT INR: ICD-10-PCS | Mod: QW,HCNC,S$GLB, | Performed by: INTERNAL MEDICINE

## 2019-11-20 PROCEDURE — 93793 PR ANTICOAGULANT MGMT FOR PT TAKING WARFARIN: ICD-10-PCS | Mod: HCNC,S$GLB,,

## 2019-11-20 PROCEDURE — 85610 PROTHROMBIN TIME: CPT | Mod: QW,HCNC,S$GLB, | Performed by: INTERNAL MEDICINE

## 2019-11-20 PROCEDURE — 93793 ANTICOAG MGMT PT WARFARIN: CPT | Mod: HCNC,S$GLB,,

## 2019-11-20 NOTE — PROGRESS NOTES
INR at goal. Medications and chart reviewed. No changes noted to necessitate adjustment of warfarin or follow-up plan. See calendar.  Findings: Pt denies any changes.  She is thinking about resuming drinking daily (will notify CC if decides to).  She has also started drinking cranberry juice daily & was started on ditropan. Discussed w/ pt the importance being consistent w/ drinking & it's affects on INR.  Pt indicated understanding.    Patient was re-educated on situations that would require placing a call to the Coumadin Clinic, including bleeding or unusual bruising issues, changes in health, diet or medications,upcoming procedures that require warfarin interruption, and missed Coumadin dose(s). Patient expressed understanding that avoidance of consistency with these parameters could cause fluctuations in INR, leading to more frequent visits and increase risk of adverse events.

## 2019-11-22 ENCOUNTER — OFFICE VISIT (OUTPATIENT)
Dept: CARDIOLOGY | Facility: CLINIC | Age: 84
End: 2019-11-22
Payer: MEDICARE

## 2019-11-22 VITALS
SYSTOLIC BLOOD PRESSURE: 154 MMHG | HEIGHT: 66 IN | BODY MASS INDEX: 29.97 KG/M2 | HEART RATE: 73 BPM | WEIGHT: 186.5 LBS | DIASTOLIC BLOOD PRESSURE: 67 MMHG

## 2019-11-22 DIAGNOSIS — Z79.01 CURRENT USE OF LONG TERM ANTICOAGULATION: ICD-10-CM

## 2019-11-22 DIAGNOSIS — I50.32 CHRONIC DIASTOLIC HEART FAILURE: Primary | ICD-10-CM

## 2019-11-22 DIAGNOSIS — I48.20 CHRONIC ATRIAL FIBRILLATION: ICD-10-CM

## 2019-11-22 DIAGNOSIS — R03.0 ELEVATED BLOOD-PRESSURE READING, WITHOUT DIAGNOSIS OF HYPERTENSION: ICD-10-CM

## 2019-11-22 DIAGNOSIS — E66.3 OVERWEIGHT (BMI 25.0-29.9): ICD-10-CM

## 2019-11-22 DIAGNOSIS — N18.30 CHRONIC KIDNEY DISEASE, STAGE III (MODERATE): ICD-10-CM

## 2019-11-22 DIAGNOSIS — I87.2 VENOUS INSUFFICIENCY OF BOTH LOWER EXTREMITIES: ICD-10-CM

## 2019-11-22 DIAGNOSIS — E78.00 PURE HYPERCHOLESTEROLEMIA: ICD-10-CM

## 2019-11-22 PROCEDURE — 1159F PR MEDICATION LIST DOCUMENTED IN MEDICAL RECORD: ICD-10-PCS | Mod: HCNC,S$GLB,, | Performed by: NURSE PRACTITIONER

## 2019-11-22 PROCEDURE — 99499 RISK ADDL DX/OHS AUDIT: ICD-10-PCS | Mod: HCNC,S$GLB,, | Performed by: NURSE PRACTITIONER

## 2019-11-22 PROCEDURE — 99499 UNLISTED E&M SERVICE: CPT | Mod: HCNC,S$GLB,, | Performed by: NURSE PRACTITIONER

## 2019-11-22 PROCEDURE — 1159F MED LIST DOCD IN RCRD: CPT | Mod: HCNC,S$GLB,, | Performed by: NURSE PRACTITIONER

## 2019-11-22 PROCEDURE — 1126F AMNT PAIN NOTED NONE PRSNT: CPT | Mod: HCNC,S$GLB,, | Performed by: NURSE PRACTITIONER

## 2019-11-22 PROCEDURE — 99214 OFFICE O/P EST MOD 30 MIN: CPT | Mod: HCNC,S$GLB,, | Performed by: NURSE PRACTITIONER

## 2019-11-22 PROCEDURE — 1126F PR PAIN SEVERITY QUANTIFIED, NO PAIN PRESENT: ICD-10-PCS | Mod: HCNC,S$GLB,, | Performed by: NURSE PRACTITIONER

## 2019-11-22 PROCEDURE — 1101F PT FALLS ASSESS-DOCD LE1/YR: CPT | Mod: HCNC,CPTII,S$GLB, | Performed by: NURSE PRACTITIONER

## 2019-11-22 PROCEDURE — 99214 PR OFFICE/OUTPT VISIT, EST, LEVL IV, 30-39 MIN: ICD-10-PCS | Mod: HCNC,S$GLB,, | Performed by: NURSE PRACTITIONER

## 2019-11-22 PROCEDURE — 99999 PR PBB SHADOW E&M-EST. PATIENT-LVL III: CPT | Mod: PBBFAC,HCNC,, | Performed by: NURSE PRACTITIONER

## 2019-11-22 PROCEDURE — 1101F PR PT FALLS ASSESS DOC 0-1 FALLS W/OUT INJ PAST YR: ICD-10-PCS | Mod: HCNC,CPTII,S$GLB, | Performed by: NURSE PRACTITIONER

## 2019-11-22 PROCEDURE — 99999 PR PBB SHADOW E&M-EST. PATIENT-LVL III: ICD-10-PCS | Mod: PBBFAC,HCNC,, | Performed by: NURSE PRACTITIONER

## 2019-11-22 NOTE — PATIENT INSTRUCTIONS
Salt restriction of 2,000mg a day. Weigh yourself every morning after you go to the restroom.  Take an extra dose of Lasix (furosemide) if weight increases 3 or more pounds in a 24 hr period, or 5 pounds over a 7 day period. Contact our office if weight does not return to baseline within 1-2 days    You can try drinking a little diet tonic water for the muscle cramps.

## 2019-11-22 NOTE — PROGRESS NOTES
Ms. Jimenez is a patient of Dr. Saenz and was last seen in [facility] Cardiology Visit 2/17/19.      Subjective:   Patient ID:  Jenniffer Jimenez is a 87 y.o. female who presents for follow-up of Edema    Problem List:  Atrial fib - chronic 1/16  HTN  Hypercholesterolemia  TIA 1997 and then transient vision loss ~2005  CHF diastolic     HPI:   Jenniffer Jimenez is in clinic today for routine follow up.  Reports LE edema that started last month.  She had a venous ultrasound on 10/3/19 that revealed venous reflux and no DVT.  Reports following a low salt diet. Patient denies chest pain with exertion or at rest, palpitations, SOB, MENDIETA, dizziness, syncope, orthopnea, PND, or claudication.  Reports routine exercise, biking 30-60 minutes on level 3 everyday.  She goes to the [employer] daily and does eat lunch there.  She reports having fatiguing early when raking the leaves yesterday.  Patient is taking warfarin for thromboembolic prophylaxis.  Denies bleeding gums, epistaxis, hemoptysis, hematuria, and hematochezia.    Review of Systems   Constitution: Negative for decreased appetite, diaphoresis, malaise/fatigue, weight gain and weight loss.   Eyes: Negative for visual disturbance.   Cardiovascular: Positive for leg swelling. Negative for chest pain, claudication, dyspnea on exertion, irregular heartbeat, near-syncope, orthopnea, palpitations, paroxysmal nocturnal dyspnea and syncope.        Denies chest pressure   Respiratory: Negative for cough, hemoptysis, shortness of breath, sleep disturbances due to breathing and snoring.    Endocrine: Negative for cold intolerance and heat intolerance.   Hematologic/Lymphatic: Negative for bleeding problem. Does not bruise/bleed easily.   Musculoskeletal: Negative for myalgias.   Gastrointestinal: Negative for bloating, abdominal pain, anorexia, change in bowel habit, constipation, diarrhea, nausea and vomiting.   Neurological: Negative for difficulty with concentration,  "disturbances in coordination, excessive daytime sleepiness, dizziness, headaches, light-headedness, loss of balance, numbness and weakness.   Psychiatric/Behavioral: The patient does not have insomnia.        Allergies and current medications updated and reviewed:  Review of patient's allergies indicates:   Allergen Reactions    Bactrim [sulfamethoxazole-trimethoprim] Other (See Comments)     "Couldn't walk"    Opioids - morphine analogues Other (See Comments)     Bowel issues; bowel obstruction    Tizanidine Other (See Comments)     "Lips were numb,  Almost passed out."    Tramadol Hallucinations    Beta-blockers (beta-adrenergic blocking agts) Other (See Comments)     Can not go on beta blockers for long period of time - due to taking allergy injections    Phenergan [promethazine] Other (See Comments)     Per pt. request- saw sisters have bad reaction to drug     Current Outpatient Medications   Medication Sig    acetaminophen (TYLENOL) 500 MG tablet Take 1,000 mg by mouth 2 (two) times daily as needed for Pain. Stated that per Cards, she can take only two doses per day prn    fluticasone propionate (FLONASE) 50 mcg/actuation nasal spray USE 1 SPRAY IN EACH NOSTRIL ONE TIME DAILY    furosemide (LASIX) 20 MG tablet TAKE 1 TABLET(20 MG) BY MOUTH EVERY DAY EXTRA TAB PRN FOR SWELLING    mupirocin (BACTROBAN) 2 % ointment Apply topically 3 (three) times daily.    mupirocin (BACTROBAN) 2 % ointment Apply to affected area 3 times daily    oxybutynin (DITROPAN) 5 MG Tab Take 1 tablet (5 mg total) by mouth daily as needed.    peg 400-propylene glycol (SYSTANE) 0.4-0.3 % Drop Place 1-2 drops into both eyes as needed.     potassium chloride (MICRO-K) 10 MEQ CpSR TAKE 1 CAPSULE(10 MEQ) BY MOUTH EVERY DAY    pravastatin (PRAVACHOL) 40 MG tablet Take 1 tablet (40 mg total) by mouth 2 (two) times daily. (Patient taking differently: Take 40 mg by mouth once daily. )    triamcinolone (NASACORT) 55 mcg nasal inhaler " "2 sprays by Nasal route once daily.    warfarin (COUMADIN) 3 MG tablet 4.5mg PO daily - OR AS DIRECTED BY COUMADIN CLINIC     Current Facility-Administered Medications   Medication    Allergy Mix    Allergy Mix       Objective:        BP (!) 154/67   Pulse 73   Ht 5' 6" (1.676 m)   Wt 84.6 kg (186 lb 8.2 oz)   LMP  (LMP Unknown)   BMI 30.10 kg/m²     Physical Exam   Constitutional: She is oriented to person, place, and time. Vital signs are normal. She appears well-developed and well-nourished. She is active. No distress.   HENT:   Head: Normocephalic and atraumatic.   Right Ear: Decreased hearing is noted.   Left Ear: Decreased hearing is noted.   Eyes: Conjunctivae and lids are normal. No scleral icterus.   Neck: Neck supple. Normal carotid pulses, no hepatojugular reflux and no JVD present. Carotid bruit is not present.   Cardiovascular: Normal rate, S1 normal, S2 normal and intact distal pulses. An irregularly irregular rhythm present. PMI is not displaced. Exam reveals no gallop and no friction rub.   No murmur heard.  Pulses:       Carotid pulses are 2+ on the right side, and 2+ on the left side.       Radial pulses are 2+ on the right side, and 2+ on the left side.        Dorsalis pedis pulses are 2+ on the right side, and 2+ on the left side.        Posterior tibial pulses are 1+ on the right side, and 1+ on the left side.   Pulmonary/Chest: Effort normal and breath sounds normal. No respiratory distress. She has no decreased breath sounds. She has no wheezes. She has no rhonchi. She has no rales. She exhibits no tenderness.   Abdominal: Soft. Normal appearance and bowel sounds are normal. She exhibits no distension, no fluid wave, no abdominal bruit, no ascites and no pulsatile midline mass. There is no hepatosplenomegaly. There is no tenderness.   Musculoskeletal: She exhibits edema (trace pitting; wearing compression stockings).   Neurological: She is alert and oriented to person, place, and time. " Gait normal.   Skin: Skin is warm, dry and intact. No rash noted. She is not diaphoretic. Nails show no clubbing.   Psychiatric: She has a normal mood and affect. Her speech is normal and behavior is normal. Judgment and thought content normal. Cognition and memory are normal.   Nursing note and vitals reviewed.      Chemistry        Component Value Date/Time     10/03/2019 0917    K 4.0 10/03/2019 0917     10/03/2019 0917    CO2 27 10/03/2019 0917    BUN 20 10/03/2019 0917    CREATININE 1.0 10/03/2019 0917     10/03/2019 0917        Component Value Date/Time    CALCIUM 9.8 10/03/2019 0917    ALKPHOS 134 10/03/2019 0917    AST 22 10/03/2019 0917    ALT 16 10/03/2019 0917    BILITOT 0.9 10/03/2019 0917    ESTGFRAFRICA 58.5 (A) 10/03/2019 0917    EGFRNONAA 50.8 (A) 10/03/2019 0917        Lab Results   Component Value Date    HGBA1C 6.0 (H) 10/03/2019     Recent Labs   Lab 01/24/17  0730  01/24/18  0739  10/03/19  0917   WBC 4.40   < > 4.75   < > 4.79   Hemoglobin 12.3   < > 12.4   < > 12.5   Hematocrit 36.3 L   < > 37.6   < > 38.8   Mean Corpuscular Volume 88   < > 89   < > 93   Platelets 242   < > 258   < > 232    H  --  147 H  --   --    TSH  --   --  3.998   < > 2.164   Cholesterol  --    < > 224 H   < > 218 H   HDL  --    < > 82 H   < > 82 H   LDL Cholesterol  --    < > 117.8   < > 117.4   Triglycerides  --    < > 121   < > 93   Hdl/Cholesterol Ratio  --    < > 36.6   < > 37.6    < > = values in this interval not displayed.       Recent Labs   Lab 09/24/19  0907 10/02/19  1305 10/23/19  1427 11/20/19  1436   INR 2.7 2.9 2.6 2.4        Test(s) Reviewed  I have reviewed the following in detail:  [] Stress test   [] Angiography   [x] Echocardiogram   [x] Labs   [] Other:         Assessment/Plan:   1. Chronic diastolic heart failure  Euvolemic on exam.  2 gm sodium diet.  Instructed to take an extra lasix PRN wt gain >3 lbs/day or >5 lbs/wk.     2. Pure hypercholesterolemia  .  Encouraged mediterranean diet and exercise.     3. Chronic atrial fibrillation  Irregularly irregular rhythm. Continue current regimen.    4. Current use of long term anticoagulation  Denies bleeding.  Discussed when to seek immediate medical attention.       5. Chronic kidney disease, stage III (moderate)  Stable and mild.    6. Overweight (BMI 25.0-29.9)  BMI 30.1 Encouraged increased CV exercise to 30 minutes a day for 5 days a week.     7. Venous insufficiency  No exam findings to support HF and asymptomatic other than the LE edema.  She had a venous ultrasound on 10/3 that did not reveal a DVT in either leg but did reveal venous reflux bilaterally that would confirm the dx of venous insufficiency.      8. Elevated blood pressure  Not currently treated for HTN.  Typically her bp runs 116-130s but has been elevated at her last 2 visits.  Requested she monitor her bp at home and call in 2 weeks.  If bp remains elevated, would start ARB such as losartan 25 mg daily       Patient was discussed with but not examined by Dr. Saenz    Follow up in about 6 months (around 5/22/2020).

## 2019-12-02 ENCOUNTER — CLINICAL SUPPORT (OUTPATIENT)
Dept: INTERNAL MEDICINE | Facility: CLINIC | Age: 84
End: 2019-12-02
Payer: MEDICARE

## 2019-12-02 ENCOUNTER — TELEPHONE (OUTPATIENT)
Dept: INTERNAL MEDICINE | Facility: CLINIC | Age: 84
End: 2019-12-02

## 2019-12-02 DIAGNOSIS — R53.81 DEBILITY: Primary | ICD-10-CM

## 2019-12-02 DIAGNOSIS — J31.0 CHRONIC RHINITIS: ICD-10-CM

## 2019-12-02 PROCEDURE — 99499 NO LOS: ICD-10-PCS | Mod: HCNC,S$GLB,, | Performed by: ALLERGY & IMMUNOLOGY

## 2019-12-02 PROCEDURE — 95115 IMMUNOTHERAPY ONE INJECTION: CPT | Mod: HCNC,S$GLB,, | Performed by: FAMILY MEDICINE

## 2019-12-02 PROCEDURE — 95115 PR IMMUNOTHERAPY, ONE INJECTION: ICD-10-PCS | Mod: HCNC,S$GLB,, | Performed by: FAMILY MEDICINE

## 2019-12-02 PROCEDURE — 99499 UNLISTED E&M SERVICE: CPT | Mod: HCNC,S$GLB,, | Performed by: ALLERGY & IMMUNOLOGY

## 2019-12-05 ENCOUNTER — PATIENT MESSAGE (OUTPATIENT)
Dept: INTERNAL MEDICINE | Facility: CLINIC | Age: 84
End: 2019-12-05

## 2019-12-05 ENCOUNTER — TELEPHONE (OUTPATIENT)
Dept: INTERNAL MEDICINE | Facility: CLINIC | Age: 84
End: 2019-12-05

## 2019-12-05 DIAGNOSIS — R53.81 DEBILITY: Primary | ICD-10-CM

## 2019-12-05 NOTE — TELEPHONE ENCOUNTER
----- Message from Giselle Zafar sent at 12/5/2019  1:48 PM CST -----  Contact: [phone]  Patient is requesting orders for a Quad walking cane. Please send to [employer].  Please advise,thanks .

## 2019-12-11 ENCOUNTER — ANTI-COAG VISIT (OUTPATIENT)
Dept: CARDIOLOGY | Facility: CLINIC | Age: 84
End: 2019-12-11
Payer: MEDICARE

## 2019-12-11 DIAGNOSIS — Z79.01 LONG TERM (CURRENT) USE OF ANTICOAGULANTS: Primary | ICD-10-CM

## 2019-12-11 DIAGNOSIS — Z79.01 CURRENT USE OF LONG TERM ANTICOAGULATION: ICD-10-CM

## 2019-12-11 DIAGNOSIS — I48.20 CHRONIC ATRIAL FIBRILLATION: ICD-10-CM

## 2019-12-11 LAB — INR PPP: 2.3 (ref 2–3)

## 2019-12-11 PROCEDURE — 93793 ANTICOAG MGMT PT WARFARIN: CPT | Mod: HCNC,S$GLB,,

## 2019-12-11 PROCEDURE — 85610 POCT INR: ICD-10-PCS | Mod: QW,HCNC,S$GLB, | Performed by: INTERNAL MEDICINE

## 2019-12-11 PROCEDURE — 85610 PROTHROMBIN TIME: CPT | Mod: QW,HCNC,S$GLB, | Performed by: INTERNAL MEDICINE

## 2019-12-11 PROCEDURE — 93793 PR ANTICOAGULANT MGMT FOR PT TAKING WARFARIN: ICD-10-PCS | Mod: HCNC,S$GLB,,

## 2019-12-11 NOTE — PROGRESS NOTES
INR at goal. Medications and chart reviewed. No changes noted to necessitate adjustment of warfarin or follow-up plan. See calendar.  Findings: Pt denies any changes and is having pulse dye laser 12/17 for her Rosacea.  She denies any other changes.  Maintain current regimen & re-assess in 4 weeks.   Patient was re-educated on situations that would require placing a call to the [facility], including bleeding or unusual bruising issues, changes in health, diet or medications,upcoming procedures that require warfarin interruption, and missed Coumadin dose(s). Patient expressed understanding that avoidance of consistency with these parameters could cause fluctuations in INR, leading to more frequent visits and increase risk of adverse events.

## 2019-12-16 ENCOUNTER — TELEPHONE (OUTPATIENT)
Dept: DERMATOLOGY | Facility: CLINIC | Age: 84
End: 2019-12-16

## 2019-12-16 NOTE — TELEPHONE ENCOUNTER
L/M, appt usually approx 30 minutes----- Message from Sagrario Cardenas sent at 12/16/2019  8:46 AM CST -----  Contact: CASTILLO ARREDONDO  Type: Patient Call Back    Who called:CASTILLO ARREDONDO    What is the request in detail: Patient is requesting a call back. She states that she would like to know how long her visit will be. She states that she need to let her transportation know.   Please contact to further advise.    Can the clinic reply by [facility]? No    Best call back number: [phone] (Please call after 3pm)    Additional Information: N/A

## 2019-12-17 ENCOUNTER — PROCEDURE VISIT (OUTPATIENT)
Dept: DERMATOLOGY | Facility: CLINIC | Age: 84
End: 2019-12-17
Payer: MEDICARE

## 2019-12-17 DIAGNOSIS — I78.1 TELANGIECTASIA: ICD-10-CM

## 2019-12-17 DIAGNOSIS — Z41.1 ELECTIVE PROCEDURE FOR UNACCEPTABLE COSMETIC APPEARANCE: Primary | ICD-10-CM

## 2019-12-17 DIAGNOSIS — L71.9 ROSACEA: ICD-10-CM

## 2019-12-17 PROCEDURE — 17999 PR V BEAM, 51-150 PULSES: ICD-10-PCS | Mod: CSM,S$GLB,, | Performed by: DERMATOLOGY

## 2019-12-17 PROCEDURE — 99499 NO LOS: ICD-10-PCS | Mod: CSM,S$GLB,, | Performed by: DERMATOLOGY

## 2019-12-17 PROCEDURE — 99499 UNLISTED E&M SERVICE: CPT | Mod: CSM,S$GLB,, | Performed by: DERMATOLOGY

## 2019-12-17 PROCEDURE — 17999 UNLISTD PX SKN MUC MEMB SUBQ: CPT | Mod: CSM,S$GLB,, | Performed by: DERMATOLOGY

## 2019-12-20 RX ORDER — WARFARIN 3 MG/1
TABLET ORAL
Qty: 140 TABLET | Refills: 3 | Status: SHIPPED | OUTPATIENT
Start: 2019-12-20 | End: 2020-02-28 | Stop reason: SDUPTHER

## 2019-12-27 ENCOUNTER — PES CALL (OUTPATIENT)
Dept: ADMINISTRATIVE | Facility: CLINIC | Age: 84
End: 2019-12-27

## 2020-01-01 ENCOUNTER — NURSE TRIAGE (OUTPATIENT)
Dept: ADMINISTRATIVE | Facility: CLINIC | Age: 85
End: 2020-01-01

## 2020-01-01 ENCOUNTER — HOSPITAL ENCOUNTER (EMERGENCY)
Facility: HOSPITAL | Age: 85
Discharge: HOME | End: 2020-01-02
Attending: EMERGENCY MEDICINE
Payer: MEDICARE

## 2020-01-01 DIAGNOSIS — M79.606 LEG PAIN: ICD-10-CM

## 2020-01-01 DIAGNOSIS — M25.579 ANKLE PAIN: ICD-10-CM

## 2020-01-01 DIAGNOSIS — M79.89 LEG SWELLING: ICD-10-CM

## 2020-01-01 PROCEDURE — 85610 PROTHROMBIN TIME: CPT | Mod: HCNC

## 2020-01-01 PROCEDURE — 99284 EMERGENCY DEPT VISIT MOD MDM: CPT | Mod: HCNC,,, | Performed by: EMERGENCY MEDICINE

## 2020-01-01 PROCEDURE — 99284 PR EMERGENCY DEPT VISIT,LEVEL IV: ICD-10-PCS | Mod: HCNC,,, | Performed by: EMERGENCY MEDICINE

## 2020-01-01 PROCEDURE — 80053 COMPREHEN METABOLIC PANEL: CPT | Mod: HCNC

## 2020-01-01 PROCEDURE — 99285 EMERGENCY DEPT VISIT HI MDM: CPT | Mod: 25,HCNC

## 2020-01-01 PROCEDURE — 86140 C-REACTIVE PROTEIN: CPT | Mod: HCNC

## 2020-01-01 PROCEDURE — 25000003 PHARM REV CODE 250: Mod: HCNC | Performed by: EMERGENCY MEDICINE

## 2020-01-01 PROCEDURE — 85652 RBC SED RATE AUTOMATED: CPT | Mod: HCNC

## 2020-01-01 PROCEDURE — 85025 COMPLETE CBC W/AUTO DIFF WBC: CPT | Mod: HCNC

## 2020-01-01 PROCEDURE — 83880 ASSAY OF NATRIURETIC PEPTIDE: CPT | Mod: HCNC

## 2020-01-01 RX ORDER — IBUPROFEN 600 MG/1
600 TABLET ORAL
Status: COMPLETED | OUTPATIENT
Start: 2020-01-01 | End: 2020-01-01

## 2020-01-01 RX ADMIN — IBUPROFEN 600 MG: 600 TABLET, FILM COATED ORAL at 11:01

## 2020-01-02 ENCOUNTER — TELEPHONE (OUTPATIENT)
Dept: INTERNAL MEDICINE | Facility: CLINIC | Age: 85
End: 2020-01-02

## 2020-01-02 VITALS
TEMPERATURE: 99 F | OXYGEN SATURATION: 97 % | HEART RATE: 85 BPM | RESPIRATION RATE: 18 BRPM | SYSTOLIC BLOOD PRESSURE: 147 MMHG | DIASTOLIC BLOOD PRESSURE: 64 MMHG

## 2020-01-02 LAB
ALBUMIN SERPL BCP-MCNC: 3.8 G/DL (ref 3.5–5.2)
ALP SERPL-CCNC: 144 U/L (ref 55–135)
ALT SERPL W/O P-5'-P-CCNC: 14 U/L (ref 10–44)
ANION GAP SERPL CALC-SCNC: 12 MMOL/L (ref 8–16)
AST SERPL-CCNC: 19 U/L (ref 10–40)
BASOPHILS # BLD AUTO: 0.02 K/UL (ref 0–0.2)
BASOPHILS NFR BLD: 0.3 % (ref 0–1.9)
BILIRUB SERPL-MCNC: 0.7 MG/DL (ref 0.1–1)
BNP SERPL-MCNC: 178 PG/ML (ref 0–99)
BUN SERPL-MCNC: 24 MG/DL (ref 8–23)
CALCIUM SERPL-MCNC: 9.1 MG/DL (ref 8.7–10.5)
CHLORIDE SERPL-SCNC: 105 MMOL/L (ref 95–110)
CO2 SERPL-SCNC: 23 MMOL/L (ref 23–29)
CREAT SERPL-MCNC: 1 MG/DL (ref 0.5–1.4)
CRP SERPL-MCNC: 8.1 MG/L (ref 0–8.2)
DIFFERENTIAL METHOD: ABNORMAL
EOSINOPHIL # BLD AUTO: 0.1 K/UL (ref 0–0.5)
EOSINOPHIL NFR BLD: 1.3 % (ref 0–8)
ERYTHROCYTE [DISTWIDTH] IN BLOOD BY AUTOMATED COUNT: 15.7 % (ref 11.5–14.5)
ERYTHROCYTE [SEDIMENTATION RATE] IN BLOOD BY WESTERGREN METHOD: 29 MM/HR (ref 0–36)
EST. GFR  (AFRICAN AMERICAN): 58.5 ML/MIN/1.73 M^2
EST. GFR  (NON AFRICAN AMERICAN): 50.8 ML/MIN/1.73 M^2
GLUCOSE SERPL-MCNC: 114 MG/DL (ref 70–110)
HCT VFR BLD AUTO: 34.7 % (ref 37–48.5)
HGB BLD-MCNC: 11.6 G/DL (ref 12–16)
IMM GRANULOCYTES # BLD AUTO: 0.01 K/UL (ref 0–0.04)
IMM GRANULOCYTES NFR BLD AUTO: 0.2 % (ref 0–0.5)
INR PPP: 3.2 (ref 0.8–1.2)
LYMPHOCYTES # BLD AUTO: 0.7 K/UL (ref 1–4.8)
LYMPHOCYTES NFR BLD: 12.4 % (ref 18–48)
MCH RBC QN AUTO: 30 PG (ref 27–31)
MCHC RBC AUTO-ENTMCNC: 33.4 G/DL (ref 32–36)
MCV RBC AUTO: 90 FL (ref 82–98)
MONOCYTES # BLD AUTO: 0.5 K/UL (ref 0.3–1)
MONOCYTES NFR BLD: 9.1 % (ref 4–15)
NEUTROPHILS # BLD AUTO: 4.6 K/UL (ref 1.8–7.7)
NEUTROPHILS NFR BLD: 76.7 % (ref 38–73)
NRBC BLD-RTO: 0 /100 WBC
PLATELET # BLD AUTO: 208 K/UL (ref 150–350)
PMV BLD AUTO: 10.5 FL (ref 9.2–12.9)
POTASSIUM SERPL-SCNC: 4 MMOL/L (ref 3.5–5.1)
PROT SERPL-MCNC: 7.1 G/DL (ref 6–8.4)
PROTHROMBIN TIME: 30.1 SEC (ref 9–12.5)
RBC # BLD AUTO: 3.87 M/UL (ref 4–5.4)
SODIUM SERPL-SCNC: 140 MMOL/L (ref 136–145)
WBC # BLD AUTO: 5.95 K/UL (ref 3.9–12.7)

## 2020-01-02 PROCEDURE — 25000003 PHARM REV CODE 250: Mod: HCNC | Performed by: EMERGENCY MEDICINE

## 2020-01-02 RX ORDER — IBUPROFEN 600 MG/1
600 TABLET ORAL EVERY 6 HOURS PRN
Qty: 20 TABLET | Refills: 0 | Status: SHIPPED | OUTPATIENT
Start: 2020-01-02 | End: 2020-01-07

## 2020-01-02 RX ORDER — ACETAMINOPHEN 500 MG/1
1-2 CAPSULE, LIQUID FILLED ORAL EVERY 6 HOURS
Qty: 40 CAPSULE | Refills: 0 | Status: SHIPPED | OUTPATIENT
Start: 2020-01-02 | End: 2020-01-07

## 2020-01-02 RX ORDER — ACETAMINOPHEN 500 MG
1000 TABLET ORAL
Status: COMPLETED | OUTPATIENT
Start: 2020-01-02 | End: 2020-01-02

## 2020-01-02 RX ADMIN — ACETAMINOPHEN 1000 MG: 500 TABLET ORAL at 01:01

## 2020-01-02 NOTE — DISCHARGE INSTRUCTIONS
Tests you had showed: Xray did not show any fractures (broken bones).  Ultrasound did not show a blood clot.  It is possible that you sprained your ankle.    Treatments you received were:   - Air cast was placed    - Read the provided information about ankle sprain  - Recovery/healing may take up to several months    The first goal is to reduce pain and swelling of your ankle using the RICE regimen (Rest, Ice, Compression, Elevation):  - Rest: Do not walk or bear weight on your sprained ankle.To get around, use the crutches that have been provided to you.  - Ice: For pain and swelling, apply ice packs (with a towel over skin) for 15 minutes 3 - 5 times per day  - Compression: To help with swelling, wrap the ankle with an Ace wrap as needed  - Elevation: Elevate (boost) your ankle to the height of your hip when sitting for at least the first 24 - 48 hours    Home Care Instructions include:  Take ibuprofen (also called Advil, Motrin) for your pain. This medicine is available over-the-counter in 200 mg tablets.  - You may take 600 mg every 6 hours, or 800 mg every 8 hours as needed   - Do not take more than this amount, as it can cause kidney problems, bleeding in your stomach, and other serious problems.   - Do not also take naproxen (Aleve) at the same time or on the same day  - If you have heart problems or uncontrolled high blood pressure, you should not take ibuprofen for more than 3 days without discussing with your doctor    Follow-up plan:  - Follow-up with: Primary care doctor within 3 - 5 days  - Follow-up for additional testing and/or evaluation as directed by your primary doctor  - If your pain persists, follow-up with the provided orthopedic surgeon group  - Return to activity as directed by your doctor or orthopedic surgeon    Return to the Emergency Department for symptoms including but not limited to: worsening symptoms, severe pain, shortness of breath or chest pain, vomiting with inability to hold  down fluids, fevers greater than 100.4°F, passing out/unconsciousness, or other concerning symptoms.

## 2020-01-02 NOTE — ED NOTES
Pt did not want to use velcro ankle splints. MD Deedee made aware and verbalized that it was okay for pt to use walking boot. Walking boot applied to pt's right ankle. Pt educated about how to use boot and verbalized understanding.

## 2020-01-02 NOTE — ED PROVIDER NOTES
"Source of History:  Patient    Chief complaint:  Foot Pain (left foot pain since yesterday. redness noted to left foot and ankle and warm to touch.)    HPI:  Jenniffer Jimenez is a 87 y.o. female with history of , TIA, hearing loss, arthritis presenting to emergency department with complaint of right leg pain. Patient has noted that her right leg seems more swollen than usual over the past week.  She went to urgent care few days ago and states that they did not do any testing and send her home.    She states that this evening she developed worsening pain in her right ankle and calf.  She denies any trauma, aside from trying to put on compression stockings.  She has not had fevers or chills.  There is no redness of the skin.  No fevers or chills.  No chest pain or shortness of breath.  No hemoptysis.  She is compliant with her Coumadin.    She states that she is having increasing difficulty walking due to the degree of her pain.  She did not take any pain medication prior to arrival.    Later, the patient noted that she may have hurt her leg when she was bending down and trying to reach something under her bed.    ROS: As per HPI and below:  Review of Systems   Constitutional: Negative for fever.   HENT: Negative for sore throat.    Eyes: Negative for double vision.   Respiratory: Negative for cough and shortness of breath.    Cardiovascular: Negative for chest pain.   Gastrointestinal: Negative for abdominal pain and vomiting.   Genitourinary: Negative for dysuria.   Musculoskeletal: Positive for joint pain. Negative for falls.   Skin: Negative for rash.   Neurological: Negative for headaches.       Review of patient's allergies indicates:   Allergen Reactions    Bactrim [sulfamethoxazole-trimethoprim] Other (See Comments)     "Couldn't walk"    Opioids - morphine analogues Other (See Comments)     Bowel issues; bowel obstruction    Tizanidine Other (See Comments)     "Lips were numb,  Almost passed out."    " Tramadol Hallucinations    Beta-blockers (beta-adrenergic blocking agts) Other (See Comments)     Can not go on beta blockers for long period of time - due to taking allergy injections    Phenergan [promethazine] Other (See Comments)     Per pt. request- saw sisters have bad reaction to drug       Current Facility-Administered Medications on File Prior to Encounter   Medication Dose Route Frequency Provider Last Rate Last Dose    Allergy Mix   Subcutaneous 1 time in Clinic/HOD Srinivas Castro MD         Current Outpatient Medications on File Prior to Encounter   Medication Sig Dispense Refill    fluticasone propionate (FLONASE) 50 mcg/actuation nasal spray USE 1 SPRAY IN EACH NOSTRIL ONE TIME DAILY 32 g 11    furosemide (LASIX) 20 MG tablet TAKE 1 TABLET(20 MG) BY MOUTH EVERY DAY EXTRA TAB PRN FOR SWELLING 100 tablet 3    mupirocin (BACTROBAN) 2 % ointment Apply to affected area 3 times daily 22 g 1    oxybutynin (DITROPAN) 5 MG Tab Take 1 tablet (5 mg total) by mouth daily as needed. 90 tablet 0    peg 400-propylene glycol (SYSTANE) 0.4-0.3 % Drop Place 1-2 drops into both eyes as needed.       potassium chloride (MICRO-K) 10 MEQ CpSR TAKE 1 CAPSULE(10 MEQ) BY MOUTH EVERY DAY 90 capsule 3    pravastatin (PRAVACHOL) 40 MG tablet Take 1 tablet (40 mg total) by mouth 2 (two) times daily. (Patient taking differently: Take 40 mg by mouth once daily. ) 180 tablet 3    triamcinolone (NASACORT) 55 mcg nasal inhaler 2 sprays by Nasal route once daily. 17 g 0    warfarin (COUMADIN) 3 MG tablet 4.5mg PO daily - OR AS DIRECTED BY COUMADIN CLINIC 140 tablet 3    [DISCONTINUED] acetaminophen (TYLENOL) 500 MG tablet Take 1,000 mg by mouth 2 (two) times daily as needed for Pain. Stated that per Cards, she can take only two doses per day prn         PMH:  As per HPI and below:  Past Medical History:   Diagnosis Date    Amblyopia of left eye 4/10/2013    Anticoagulant long-term use     Coumadin - A-Fib    Anxiety      Arthritis     Facet arthropathy, Lumbosacral    Atherosclerosis of aorta     Atrial fibrillation     Auricular fibrillation     Central retinal vein occlusion of left eye     CHF (congestive heart failure)     Chronic kidney disease, stage 3     Coronary artery disease     Depression     Diastolic heart failure 2014    Exotropia of both eyes 2013    recession RSR 5.0mm w/ adj; recession LR os 5.0 w/ adj; resect MR os  4.0mm    Hearing loss     History of resection of small bowel     Hyperlipidemia     Hypertension     Hypertensive retinopathy of both eyes     Hypoglycemia     Macular degeneration     Meniere's disease of both ears     OA (osteoarthritis) of shoulder     Right    Osteoporosis     Other and unspecified hyperlipidemia     Posterior vitreous detachment of both eyes     Rhinitis     TIA (transient ischemic attack)      Past Surgical History:   Procedure Laterality Date    APPENDECTOMY      CARDIAC CATHETERIZATION      CATARACT EXTRACTION W/  INTRAOCULAR LENS IMPLANT Bilateral      SECTION, CLASSIC      HYSTERECTOMY      INNER EAR SURGERY      JOINT REPLACEMENT      LEFT KNEE REPLACEMENT IN  -    OOPHORECTOMY      SINUS SURGERY      STRABISMUS SURGERY  13    RSR recession 5 mm, LLR recession 5 mm and LMR resection 4mm    STRABISMUS SURGERY  2014    recess LR OD 6mm    TONSILLECTOMY         Social History     Socioeconomic History    Marital status:      Spouse name: Not on file    Number of children: Not on file    Years of education: Not on file    Highest education level: Not on file   Occupational History    Not on file   Social Needs    Financial resource strain: Not on file    Food insecurity:     Worry: Not on file     Inability: Not on file    Transportation needs:     Medical: Not on file     Non-medical: Not on file   Tobacco Use    Smoking status: Former Smoker     Types: Cigarettes     Last attempt to quit:  10/29/1982     Years since quittin.2    Smokeless tobacco: Never Used   Substance and Sexual Activity    Alcohol use: Yes     Alcohol/week: 0.0 standard drinks     Comment: socially    Drug use: No    Sexual activity: Never   Lifestyle    Physical activity:     Days per week: Not on file     Minutes per session: Not on file    Stress: Not on file   Relationships    Social connections:     Talks on phone: Not on file     Gets together: Not on file     Attends [employer] service: Not on file     Active member of club or organization: Not on file     Attends meetings of clubs or organizations: Not on file     Relationship status: Not on file   Other Topics Concern    Patient feels they ought to cut down on drinking/drug use Not Asked    Patient annoyed by others criticizing their drinking/drug use Not Asked    Patient has felt bad or guilty about drinking/drug use Not Asked    Patient has had a drink/used drugs as an eye opener in the AM Not Asked    Are you pregnant or think you may be? No    Breast-feeding No   Social History Narrative    Not on file       Family History   Problem Relation Age of Onset    Hypertension Mother     Hypertension Father     Liver disease Sister     Diabetes Sister     Heart disease Sister     Diabetes Brother     Breast cancer Maternal Aunt     No Known Problems Maternal Uncle     No Known Problems Paternal Aunt     No Known Problems Paternal Uncle     No Known Problems Maternal Grandmother     No Known Problems Maternal Grandfather     No Known Problems Paternal Grandmother     No Known Problems Paternal Grandfather     No Known Problems Daughter     No Known Problems Son     Heart disease Sister     No Known Problems Son     No Known Problems Son     Cancer Neg Hx         in first degree relatives    Melanoma Neg Hx     Psoriasis Neg Hx     Lupus Neg Hx     Amblyopia Neg Hx     Blindness Neg Hx     Cataracts Neg Hx     Glaucoma Neg Hx      Macular degeneration Neg Hx     Retinal detachment Neg Hx     Strabismus Neg Hx     Stroke Neg Hx     Thyroid disease Neg Hx        Physical Exam:    Vitals:    01/02/20 0031   BP: (!) 147/64   Pulse:    Resp:    Temp:      Gen: No acute distress. Nontoxic.  Mental Status:  Alert and oriented x 3.  Appropriate, conversant.  Skin: Warm, dry. No rashes seen.   Pulm: No increased work of breathing.  CV: Normal peripheral perfusion.  MSK:  Swelling surrounding the right ankle and distal right calf.  There is no erythema, ecchymosis. She has full range of motion of the ankle both passively and actively.  Sensation is intact.  There is no focal tenderness to palpation, but she is tender throughout the right ankle.  No tenderness throughout the foot  Neuro: Awake. Speech normal. No focal neuro deficit observed.    Laboratory Studies:  Labs Reviewed   CBC W/ AUTO DIFFERENTIAL - Abnormal; Notable for the following components:       Result Value    RBC 3.87 (*)     Hemoglobin 11.6 (*)     Hematocrit 34.7 (*)     RDW 15.7 (*)     Lymph # 0.7 (*)     Gran% 76.7 (*)     Lymph% 12.4 (*)     All other components within normal limits   COMPREHENSIVE METABOLIC PANEL - Abnormal; Notable for the following components:    Glucose 114 (*)     BUN, Bld 24 (*)     Alkaline Phosphatase 144 (*)     eGFR if  58.5 (*)     eGFR if non  50.8 (*)     All other components within normal limits   B-TYPE NATRIURETIC PEPTIDE - Abnormal; Notable for the following components:     (*)     All other components within normal limits   PROTIME-INR - Abnormal; Notable for the following components:    Prothrombin Time 30.1 (*)     INR 3.2 (*)     All other components within normal limits   SEDIMENTATION RATE   C-REACTIVE PROTEIN     X-rays (independently interpreted by me):  No fracture noted    Chart reviewed.     Imaging Results          US Lower Extremity Veins Right (Final result)  Result time 01/02/20 00:17:25     Final result by Kyle Bennett MD (01/02/20 00:17:25)                 Impression:      No evidence of deep venous thrombosis in the right lower extremity.    Soft tissue edema in the right lower extremity.    Electronically signed by resident: Wallace De La Cruz  Date:    01/02/2020  Time:    00:09    Electronically signed by: Kyle Bennett MD  Date:    01/02/2020  Time:    00:17             Narrative:    EXAMINATION:  US LOWER EXTREMITY VEINS RIGHT    CLINICAL HISTORY:  Other specified soft tissue disorders    TECHNIQUE:  Duplex and color flow Doppler evaluation and graded compression of the right lower extremity veins was performed.    COMPARISON:  No relevant prior imaging for comparison    FINDINGS:  Right thigh veins: The common femoral, femoral, popliteal, upper greater saphenous, and deep femoral veins are patent and free of thrombus. The veins are normally compressible and have normal phasic flow and augmentation response.    Right calf veins: The visualized calf veins are patent.    Contralateral CFV: The contralateral (left) common femoral vein is patent and free of thrombus.    Miscellaneous: Edema in the soft tissues of the right calf.                               X-Ray Tibia Fibula 2 View Right (Final result)  Result time 01/02/20 00:29:39    Final result by Kyle Bennett MD (01/02/20 00:29:39)                 Impression:      No acute fracture.    Additional findings as above.      Electronically signed by: Kyle Bennett MD  Date:    01/02/2020  Time:    00:29             Narrative:    EXAMINATION:  XR ANKLE COMPLETE 3 VIEW RIGHT; XR TIBIA FIBULA 2 VIEW RIGHT    CLINICAL HISTORY:  Pain in unspecified ankle and joints of unspecified foot; Pain in leg, unspecified    TECHNIQUE:  AP, lateral, and oblique images of the right ankle were performed.  AP and lateral images of the right tibia and fibula were performed.    COMPARISON:  None    FINDINGS:  No fracture or dislocation.  Ankle mortise is  symmetric.  Dense calcification inferior to the lateral malleolus, possible sequela of prior injury.  Prominent plantar calcaneal spur.  Moderate tricompartment degenerative change at the right knee.  Atherosclerotic vascular calcifications present.  Generalized soft tissue swelling right lower leg and ankle.                               X-Ray Ankle Complete Right (Final result)  Result time 01/02/20 00:29:39    Final result by Kyle Bennett MD (01/02/20 00:29:39)                 Impression:      No acute fracture.    Additional findings as above.      Electronically signed by: Kyle Bennett MD  Date:    01/02/2020  Time:    00:29             Narrative:    EXAMINATION:  XR ANKLE COMPLETE 3 VIEW RIGHT; XR TIBIA FIBULA 2 VIEW RIGHT    CLINICAL HISTORY:  Pain in unspecified ankle and joints of unspecified foot; Pain in leg, unspecified    TECHNIQUE:  AP, lateral, and oblique images of the right ankle were performed.  AP and lateral images of the right tibia and fibula were performed.    COMPARISON:  None    FINDINGS:  No fracture or dislocation.  Ankle mortise is symmetric.  Dense calcification inferior to the lateral malleolus, possible sequela of prior injury.  Prominent plantar calcaneal spur.  Moderate tricompartment degenerative change at the right knee.  Atherosclerotic vascular calcifications present.  Generalized soft tissue swelling right lower leg and ankle.                              Medications Given:  Medications   ibuprofen tablet 600 mg (600 mg Oral Given 1/1/20 7232)   acetaminophen tablet 1,000 mg (1,000 mg Oral Given 1/2/20 0101)     MDM:    87 y.o. female with right ankle and lower calf pain and swelling.  She is afebrile, stable, nontoxic, and neuro intact.    Differential diagnosis including but not limited to:  DVT, cellulitis, occult fracture, sprain    Ultrasound without evidence of DVT.  The clinically the patient does not appear to have cellulitis.  X-ray without occult fracture.  I  am not suspicious for a septic joint.    I suspect that the patient may have injured her ankle when she was bending to reach under her bed.    She has multiple drug allergies, can only give ibuprofen and Tylenol for pain.    Will place in an air cast, give walker, and have her follow up with her primary care doctor.    Diagnostic Impression:    1. Leg swelling    2. Leg pain    3. Ankle pain        Patient and/or family understands the plan and is in agreement, verbalized understanding, questions answered    Suzy Mark MD  Emergency Medicine           Suzy Mark MD  01/02/20 0147

## 2020-01-02 NOTE — TELEPHONE ENCOUNTER
Reason for Disposition   Can't stand (bear weight) or walk    Additional Information   Negative: Serious injury with multiple fractures   Negative: [1] Major bleeding (e.g., actively dripping or spurting) AND [2] can't be stopped   Negative: Amputation   Negative: Looks like a dislocated joint (very crooked or deformed)   Negative: Sounds like a life-threatening emergency to the triager   Negative: Wound looks infected   Negative: Caused by an animal bite   Negative: Caused by a human bite   Negative: Puncture wound of foot   Negative: Toe injury is main concern   Negative: Cast problems or questions   Negative: Bullet wound, stabbed by knife, or other serious penetrating wound   Negative: Skin is split open or gaping  (or length > 1/2 inch or 12 mm)   Negative: [1] Bleeding AND [2] won't stop after 10 minutes of direct pressure (using correct technique)   Negative: [1] Dirt in the wound AND [2] not removed with 15 minutes of scrubbing    Protocols used: FOOT AND ANKLE INJURY-A-AH    Pt stated she has severe pain in her right foot and ankle of unknown origin and cannot bare weight on it. Per triage protocol, advised to go to ED and not to drive. Pt verbalized understanding and will call a cab.

## 2020-01-02 NOTE — TELEPHONE ENCOUNTER
----- Message from Ann Harding sent at 1/2/2020 12:02 PM CST -----  Contact: pt at [phone]  Hannah farias-Pt is re questing an appt on January 10 if available at [facility].  Fu visit kfor a sprained ankle.  Was in ER yesterday.  Was told to follow up with her PCP.

## 2020-01-03 ENCOUNTER — PES CALL (OUTPATIENT)
Dept: ADMINISTRATIVE | Facility: CLINIC | Age: 85
End: 2020-01-03

## 2020-01-06 NOTE — PROGRESS NOTES
Subjective:       Patient ID: Jenniffer Jimenez is a 87 y.o. female.    Chief Complaint: Follow-up    Patient is a 87 y.o.female with atherosclerosis of aorta, chronic diastolic HF and ckd stage 3 who presents today for ER follow up.  Per ER visit:  87 y.o. female with right ankle and lower calf pain and swelling.  She is afebrile, stable, nontoxic, and neuro intact.     Differential diagnosis including but not limited to:  DVT, cellulitis, occult fracture, sprain     Ultrasound without evidence of DVT.  The clinically the patient does not appear to have cellulitis.  X-ray without occult fracture.  I am not suspicious for a septic joint.     I suspect that the patient may have injured her ankle when she was bending to reach under her bed.     She has multiple drug allergies, can only give ibuprofen and Tylenol for pain.     Will place in an air cast, give walker, and have her follow up with her primary care doctor.     Visit today:  - she is wearing the boot  - without the boot, she notes pain to her lateral ankle  - she notes swelling  - rates pain 6/10  - she takes tylenol prn  - now 10 days s/p and not much improvement per patient  Review of Systems   Constitutional: Negative for appetite change, chills, diaphoresis and fever.   HENT: Negative for congestion, ear discharge, ear pain, postnasal drip, tinnitus, trouble swallowing and voice change.    Eyes: Negative for discharge, redness and itching.   Respiratory: Negative for cough, chest tightness, shortness of breath and wheezing.    Cardiovascular: Negative for chest pain, palpitations and leg swelling.   Gastrointestinal: Negative for abdominal pain, constipation, diarrhea, nausea and vomiting.   Endocrine: Negative for cold intolerance and heat intolerance.   Genitourinary: Negative for difficulty urinating, flank pain, hematuria and urgency.   Musculoskeletal: Positive for arthralgias. Negative for gait problem, myalgias and neck stiffness.   Skin:  Negative for color change and rash.   Neurological: Negative for dizziness, seizures, syncope and headaches.   Hematological: Negative for adenopathy.   Psychiatric/Behavioral: Negative for agitation, behavioral problems, confusion and sleep disturbance.       Objective:      Physical Exam   Constitutional: She is oriented to person, place, and time. She appears well-developed and well-nourished. No distress.   HENT:   Head: Normocephalic and atraumatic.   Right Ear: External ear normal.   Left Ear: External ear normal.   Nose: Nose normal.   Mouth/Throat: Oropharynx is clear and moist. No oropharyngeal exudate.   Eyes: Pupils are equal, round, and reactive to light. Conjunctivae and EOM are normal. Right eye exhibits no discharge. Left eye exhibits no discharge. No scleral icterus.   Neck: Neck supple. No JVD present. No tracheal deviation present. No thyromegaly present.   Cardiovascular: Normal rate, normal heart sounds and intact distal pulses. Exam reveals no gallop and no friction rub.   No murmur heard.  Pulmonary/Chest: Effort normal and breath sounds normal. No stridor. No respiratory distress. She has no wheezes. She has no rales. She exhibits no tenderness.   Abdominal: Soft. Bowel sounds are normal. She exhibits no distension. There is no tenderness. There is no rebound.   Musculoskeletal: She exhibits no edema or tenderness.   (+) right ankle swelling; no warmth or redness   Lymphadenopathy:     She has no cervical adenopathy.   Neurological: She is alert and oriented to person, place, and time.   Skin: Skin is warm and dry. No rash noted. She is not diaphoretic. No erythema.   Psychiatric: She has a normal mood and affect. Her behavior is normal.   Nursing note and vitals reviewed.      Assessment and Plan:       1. Acute right ankle pain  - continue to wear boot until seen by podiatry  - Ambulatory Referral to Podiatry    2. Atherosclerosis of aorta  - stable; monitoring    3. Chronic atrial  fibrillation  - stable; monitoring    4. Chronic kidney disease, stage III (moderate)  - stable; monitoring    5. Chronic diastolic heart failure  - stable; monitoring          No follow-ups on file.

## 2020-01-08 ENCOUNTER — ANTI-COAG VISIT (OUTPATIENT)
Dept: CARDIOLOGY | Facility: CLINIC | Age: 85
End: 2020-01-08
Payer: MEDICARE

## 2020-01-08 DIAGNOSIS — Z79.01 CURRENT USE OF LONG TERM ANTICOAGULATION: Primary | ICD-10-CM

## 2020-01-08 DIAGNOSIS — I48.20 CHRONIC ATRIAL FIBRILLATION: ICD-10-CM

## 2020-01-08 LAB — INR PPP: 1.7 (ref 2–3)

## 2020-01-08 PROCEDURE — 85610 POCT INR: ICD-10-PCS | Mod: QW,HCNC,S$GLB, | Performed by: INTERNAL MEDICINE

## 2020-01-08 PROCEDURE — 93793 ANTICOAG MGMT PT WARFARIN: CPT | Mod: HCNC,S$GLB,,

## 2020-01-08 PROCEDURE — 85610 PROTHROMBIN TIME: CPT | Mod: QW,HCNC,S$GLB, | Performed by: INTERNAL MEDICINE

## 2020-01-08 PROCEDURE — 93793 PR ANTICOAGULANT MGMT FOR PT TAKING WARFARIN: ICD-10-PCS | Mod: HCNC,S$GLB,,

## 2020-01-08 NOTE — PROGRESS NOTES
INR not at goal. Medications, chart, and patient findings reviewed. See calendar for adjustments to dose and follow up plan.  Findings: Pt indicated that she had more vit k than usual; she recently visited the ED for swelling & pain in her LE & denies any other changes.  Recommend that pt takes 6mg 1/8 & resumes maintenance dose.  Plan to re-assess in 2 weeks.   Patient was re-educated on situations that would require placing a call to the Coumadin Clinic, including bleeding or unusual bruising issues, changes in health, diet or medications,upcoming procedures that require warfarin interruption, and missed Coumadin dose(s). Patient expressed understanding that avoidance of consistency with these parameters could cause fluctuations in INR, leading to more frequent visits and increase risk of adverse events.

## 2020-01-10 ENCOUNTER — OFFICE VISIT (OUTPATIENT)
Dept: INTERNAL MEDICINE | Facility: CLINIC | Age: 85
End: 2020-01-10
Payer: MEDICARE

## 2020-01-10 ENCOUNTER — TELEPHONE (OUTPATIENT)
Dept: PODIATRY | Facility: CLINIC | Age: 85
End: 2020-01-10

## 2020-01-10 ENCOUNTER — CLINICAL SUPPORT (OUTPATIENT)
Dept: INTERNAL MEDICINE | Facility: CLINIC | Age: 85
End: 2020-01-10
Payer: MEDICARE

## 2020-01-10 VITALS
TEMPERATURE: 99 F | BODY MASS INDEX: 29.9 KG/M2 | WEIGHT: 186.06 LBS | HEART RATE: 100 BPM | RESPIRATION RATE: 20 BRPM | DIASTOLIC BLOOD PRESSURE: 70 MMHG | SYSTOLIC BLOOD PRESSURE: 122 MMHG | HEIGHT: 66 IN

## 2020-01-10 DIAGNOSIS — I70.0 ATHEROSCLEROSIS OF AORTA: ICD-10-CM

## 2020-01-10 DIAGNOSIS — N18.30 CHRONIC KIDNEY DISEASE, STAGE III (MODERATE): ICD-10-CM

## 2020-01-10 DIAGNOSIS — I50.32 CHRONIC DIASTOLIC HEART FAILURE: ICD-10-CM

## 2020-01-10 DIAGNOSIS — M25.571 ACUTE RIGHT ANKLE PAIN: Primary | ICD-10-CM

## 2020-01-10 DIAGNOSIS — J30.9 CHRONIC ALLERGIC RHINITIS: ICD-10-CM

## 2020-01-10 DIAGNOSIS — I48.20 CHRONIC ATRIAL FIBRILLATION: ICD-10-CM

## 2020-01-10 PROCEDURE — 99999 PR PBB SHADOW E&M-EST. PATIENT-LVL IV: CPT | Mod: PBBFAC,HCNC,, | Performed by: INTERNAL MEDICINE

## 2020-01-10 PROCEDURE — 1125F AMNT PAIN NOTED PAIN PRSNT: CPT | Mod: HCNC,S$GLB,, | Performed by: INTERNAL MEDICINE

## 2020-01-10 PROCEDURE — 99999 PR PBB SHADOW E&M-EST. PATIENT-LVL IV: ICD-10-PCS | Mod: PBBFAC,HCNC,, | Performed by: INTERNAL MEDICINE

## 2020-01-10 PROCEDURE — 1159F MED LIST DOCD IN RCRD: CPT | Mod: HCNC,S$GLB,, | Performed by: INTERNAL MEDICINE

## 2020-01-10 PROCEDURE — 1125F PR PAIN SEVERITY QUANTIFIED, PAIN PRESENT: ICD-10-PCS | Mod: HCNC,S$GLB,, | Performed by: INTERNAL MEDICINE

## 2020-01-10 PROCEDURE — 99499 RISK ADDL DX/OHS AUDIT: ICD-10-PCS | Mod: HCNC,S$GLB,, | Performed by: INTERNAL MEDICINE

## 2020-01-10 PROCEDURE — 99213 OFFICE O/P EST LOW 20 MIN: CPT | Mod: HCNC,S$GLB,, | Performed by: INTERNAL MEDICINE

## 2020-01-10 PROCEDURE — 99499 UNLISTED E&M SERVICE: CPT | Mod: HCNC,S$GLB,, | Performed by: INTERNAL MEDICINE

## 2020-01-10 PROCEDURE — 99213 PR OFFICE/OUTPT VISIT, EST, LEVL III, 20-29 MIN: ICD-10-PCS | Mod: HCNC,S$GLB,, | Performed by: INTERNAL MEDICINE

## 2020-01-10 PROCEDURE — 95115 PR IMMUNOTHERAPY, ONE INJECTION: ICD-10-PCS | Mod: HCNC,S$GLB,, | Performed by: FAMILY MEDICINE

## 2020-01-10 PROCEDURE — 95115 IMMUNOTHERAPY ONE INJECTION: CPT | Mod: HCNC,S$GLB,, | Performed by: FAMILY MEDICINE

## 2020-01-10 PROCEDURE — 1159F PR MEDICATION LIST DOCUMENTED IN MEDICAL RECORD: ICD-10-PCS | Mod: HCNC,S$GLB,, | Performed by: INTERNAL MEDICINE

## 2020-01-10 PROCEDURE — 1101F PR PT FALLS ASSESS DOC 0-1 FALLS W/OUT INJ PAST YR: ICD-10-PCS | Mod: HCNC,CPTII,S$GLB, | Performed by: INTERNAL MEDICINE

## 2020-01-10 PROCEDURE — 1101F PT FALLS ASSESS-DOCD LE1/YR: CPT | Mod: HCNC,CPTII,S$GLB, | Performed by: INTERNAL MEDICINE

## 2020-01-10 PROCEDURE — 99499 NO LOS: ICD-10-PCS | Mod: HCNC,S$GLB,, | Performed by: ALLERGY & IMMUNOLOGY

## 2020-01-10 PROCEDURE — 99499 UNLISTED E&M SERVICE: CPT | Mod: HCNC,S$GLB,, | Performed by: ALLERGY & IMMUNOLOGY

## 2020-01-10 NOTE — TELEPHONE ENCOUNTER
----- Message from Katie Galarza sent at 1/10/2020  2:47 PM CST -----  Contact: pt@ [phone]  Patient has referral to Podiatry in the system, has acute right ankle pain, needs to be seen in 1 day.  Please call.

## 2020-01-10 NOTE — TELEPHONE ENCOUNTER
----- Message from Inderjit Barillas sent at 1/10/2020  1:38 PM CST -----  Contact: Patient   Good Afternoon,      placed orders for  to be seen by a Podiatrist, She has a boot on her right leg that doesn't fit her and she's also experiencing right ankle pain. I wasn't able to find any openings until February I was just calling to see if possible we can get her scheduled sooner then that. Please advise and tank you in advanced.

## 2020-01-21 ENCOUNTER — PROCEDURE VISIT (OUTPATIENT)
Dept: DERMATOLOGY | Facility: CLINIC | Age: 85
End: 2020-01-21
Payer: MEDICARE

## 2020-01-21 DIAGNOSIS — L71.9 ROSACEA: ICD-10-CM

## 2020-01-21 DIAGNOSIS — I78.1 TELANGIECTASIA: ICD-10-CM

## 2020-01-21 DIAGNOSIS — Z41.1 ELECTIVE PROCEDURE FOR UNACCEPTABLE COSMETIC APPEARANCE: Primary | ICD-10-CM

## 2020-01-21 PROCEDURE — 99499 NO LOS: ICD-10-PCS | Mod: CSM,S$GLB,, | Performed by: DERMATOLOGY

## 2020-01-21 PROCEDURE — 99499 UNLISTED E&M SERVICE: CPT | Mod: CSM,S$GLB,, | Performed by: DERMATOLOGY

## 2020-01-21 PROCEDURE — 17999 PR V BEAM, 51-150 PULSES: ICD-10-PCS | Mod: CSM,S$GLB,, | Performed by: DERMATOLOGY

## 2020-01-21 PROCEDURE — 17999 UNLISTD PX SKN MUC MEMB SUBQ: CPT | Mod: CSM,S$GLB,, | Performed by: DERMATOLOGY

## 2020-01-27 ENCOUNTER — ANTI-COAG VISIT (OUTPATIENT)
Dept: CARDIOLOGY | Facility: CLINIC | Age: 85
End: 2020-01-27
Payer: MEDICARE

## 2020-01-27 DIAGNOSIS — Z79.01 CURRENT USE OF LONG TERM ANTICOAGULATION: Primary | ICD-10-CM

## 2020-01-27 DIAGNOSIS — I48.20 CHRONIC ATRIAL FIBRILLATION: ICD-10-CM

## 2020-01-27 LAB — INR PPP: 2.3 (ref 2–3)

## 2020-01-27 PROCEDURE — 85610 PROTHROMBIN TIME: CPT | Mod: QW,HCNC,S$GLB, | Performed by: INTERNAL MEDICINE

## 2020-01-27 PROCEDURE — 93793 ANTICOAG MGMT PT WARFARIN: CPT | Mod: HCNC,S$GLB,,

## 2020-01-27 PROCEDURE — 85610 POCT INR: ICD-10-PCS | Mod: QW,HCNC,S$GLB, | Performed by: INTERNAL MEDICINE

## 2020-01-27 PROCEDURE — 93793 PR ANTICOAGULANT MGMT FOR PT TAKING WARFARIN: ICD-10-PCS | Mod: HCNC,S$GLB,,

## 2020-01-27 NOTE — PROGRESS NOTES
INR at goal. Reports leg swelling is significantly improved. Reports no changes in diet, medications, or health that would affect her INR. Plan to continue dosing per calendar and follow up in 4 weeks. Patient was re-educated on situations that would require placing a call to the coumadin clinic, including bleeding or unusual bruising issues, changes in health, diet or medications, upcoming procedures that require warfarin interruption, and missed coumadin dose(s). Patient expressed understanding that avoidance of consistency with these parameters could cause fluctuations in INR, leading to more frequent visits and increase risk of adverse events.

## 2020-02-04 ENCOUNTER — OFFICE VISIT (OUTPATIENT)
Dept: INTERNAL MEDICINE | Facility: CLINIC | Age: 85
End: 2020-02-04
Payer: MEDICARE

## 2020-02-04 VITALS
HEIGHT: 66 IN | BODY MASS INDEX: 29.05 KG/M2 | HEART RATE: 72 BPM | SYSTOLIC BLOOD PRESSURE: 134 MMHG | RESPIRATION RATE: 18 BRPM | WEIGHT: 180.75 LBS | DIASTOLIC BLOOD PRESSURE: 60 MMHG | TEMPERATURE: 99 F

## 2020-02-04 DIAGNOSIS — B02.9 HERPES ZOSTER WITHOUT COMPLICATION: Primary | ICD-10-CM

## 2020-02-04 PROCEDURE — 99999 PR PBB SHADOW E&M-EST. PATIENT-LVL III: ICD-10-PCS | Mod: PBBFAC,HCNC,, | Performed by: INTERNAL MEDICINE

## 2020-02-04 PROCEDURE — 1159F MED LIST DOCD IN RCRD: CPT | Mod: HCNC,S$GLB,, | Performed by: INTERNAL MEDICINE

## 2020-02-04 PROCEDURE — 1159F PR MEDICATION LIST DOCUMENTED IN MEDICAL RECORD: ICD-10-PCS | Mod: HCNC,S$GLB,, | Performed by: INTERNAL MEDICINE

## 2020-02-04 PROCEDURE — 99214 PR OFFICE/OUTPT VISIT, EST, LEVL IV, 30-39 MIN: ICD-10-PCS | Mod: HCNC,S$GLB,, | Performed by: INTERNAL MEDICINE

## 2020-02-04 PROCEDURE — 99214 OFFICE O/P EST MOD 30 MIN: CPT | Mod: HCNC,S$GLB,, | Performed by: INTERNAL MEDICINE

## 2020-02-04 PROCEDURE — 1125F AMNT PAIN NOTED PAIN PRSNT: CPT | Mod: HCNC,S$GLB,, | Performed by: INTERNAL MEDICINE

## 2020-02-04 PROCEDURE — 1125F PR PAIN SEVERITY QUANTIFIED, PAIN PRESENT: ICD-10-PCS | Mod: HCNC,S$GLB,, | Performed by: INTERNAL MEDICINE

## 2020-02-04 PROCEDURE — 99999 PR PBB SHADOW E&M-EST. PATIENT-LVL III: CPT | Mod: PBBFAC,HCNC,, | Performed by: INTERNAL MEDICINE

## 2020-02-04 PROCEDURE — 1101F PT FALLS ASSESS-DOCD LE1/YR: CPT | Mod: HCNC,CPTII,S$GLB, | Performed by: INTERNAL MEDICINE

## 2020-02-04 PROCEDURE — 1101F PR PT FALLS ASSESS DOC 0-1 FALLS W/OUT INJ PAST YR: ICD-10-PCS | Mod: HCNC,CPTII,S$GLB, | Performed by: INTERNAL MEDICINE

## 2020-02-04 RX ORDER — VALACYCLOVIR HYDROCHLORIDE 1 G/1
1000 TABLET, FILM COATED ORAL 3 TIMES DAILY
Qty: 15 TABLET | Refills: 0 | Status: SHIPPED | OUTPATIENT
Start: 2020-02-04 | End: 2020-02-07 | Stop reason: SDUPTHER

## 2020-02-04 RX ORDER — TRIAMCINOLONE ACETONIDE 1 MG/G
CREAM TOPICAL 2 TIMES DAILY
Qty: 80 G | Refills: 1 | Status: SHIPPED | OUTPATIENT
Start: 2020-02-04 | End: 2020-06-16

## 2020-02-04 NOTE — PROGRESS NOTES
Subjective:       Patient ID: Jenniffer Jimenez is a 87 y.o. female.    Chief Complaint: Rash (pain on the left side)    HPI   Pt here for evaluation of a few days of worsening rash located on her left lower/lateral back with pain radiating around the side to her abdomen a/w burning/tingling pain.   Review of Systems   Constitutional: Negative for activity change, appetite change, chills, diaphoresis, fatigue, fever and unexpected weight change.   HENT: Negative for postnasal drip, rhinorrhea, sinus pressure, sneezing, sore throat, trouble swallowing and voice change.    Respiratory: Negative for cough, shortness of breath and wheezing.    Cardiovascular: Negative for chest pain, palpitations and leg swelling.   Gastrointestinal: Negative for abdominal pain, blood in stool, constipation, diarrhea, nausea and vomiting.   Genitourinary: Negative for dysuria.   Musculoskeletal: Negative for arthralgias and myalgias.   Skin: Positive for rash. Negative for wound.   Allergic/Immunologic: Negative for environmental allergies and food allergies.   Hematological: Negative for adenopathy. Does not bruise/bleed easily.       Objective:      Physical Exam   Constitutional: She is oriented to person, place, and time. She appears well-developed and well-nourished. No distress.   HENT:   Head: Normocephalic and atraumatic.   Eyes: Pupils are equal, round, and reactive to light. Conjunctivae and EOM are normal. Right eye exhibits no discharge. Left eye exhibits no discharge. No scleral icterus.   Neck: Neck supple. No JVD present.   Cardiovascular: Normal rate, regular rhythm, normal heart sounds and intact distal pulses.   Pulmonary/Chest: Effort normal and breath sounds normal. No respiratory distress. She has no wheezes. She has no rales.   Musculoskeletal: She exhibits no edema.   Lymphadenopathy:     She has no cervical adenopathy.   Neurological: She is alert and oriented to person, place, and time.   Skin: Skin is warm  and dry. No rash noted. She is not diaphoretic. No pallor.            Assessment:       1. Herpes zoster without complication        Plan:    1. Rx Valtrex 1 gm TID x 5 days and Triamcinolone cream 0.1 % BID PRN        Tylenol PRN pain

## 2020-02-05 ENCOUNTER — TELEPHONE (OUTPATIENT)
Dept: ALLERGY | Facility: CLINIC | Age: 85
End: 2020-02-05

## 2020-02-05 ENCOUNTER — TELEPHONE (OUTPATIENT)
Dept: INTERNAL MEDICINE | Facility: CLINIC | Age: 85
End: 2020-02-05

## 2020-02-05 NOTE — TELEPHONE ENCOUNTER
----- Message from Columba Baldwin LPN sent at 2/5/2020  9:45 AM CST -----  Contact: pt   Please advise.     ----- Message -----  From: Sagrario Nguyen  Sent: 2/5/2020   8:51 AM CST  To: Salvador OLSEN Staff    Pt is calling to speak with the nurse pt has shingles pt is on medication and has an allergy injection on Friday 2/7/2020 pt is asking if she can still come in to get her injection. Can you please call pt at [phone]    LOU

## 2020-02-05 NOTE — TELEPHONE ENCOUNTER
She should not get shot until is better, off antiviral and lesions drying up. However she has not been seen in clinic in almost 2 years so needs follow up ASAP

## 2020-02-05 NOTE — TELEPHONE ENCOUNTER
----- Message from Kimani Mary sent at 2/5/2020  8:43 AM CST -----  Contact: Self [phone]  Patient states she has shingle, It did not spread and follow direction by Dr. Young Keep in touch, (update) please advise.

## 2020-02-05 NOTE — TELEPHONE ENCOUNTER
----- Message from Daysi Coello MD sent at 9/23/2019  4:20 PM CDT -----  Contact: Jenniffer   call on the land phone:    [phone]  /   We have already added a patient at 10 that day.  ----- Message -----  From: Zoraida Chance MA  Sent: 9/23/2019   1:36 PM CDT  To: MD Dr. Mode Wallace is it ok to put her on the 10:00am spot for 10/03/2019 due to her transportation  ----- Message -----  From: Luciana Sauer  Sent: 9/23/2019  10:42 AM CDT  To: Mode MOHAN Staff    Caller says she'll be at the [facility] on 10/3  At 8:30am and travels via [employer] bus.   Asking if you could pls squeeze her in to see her for the rash on her face.   Pls call the house phone: [phone], can't hear on the cell phone.  Pt. Says she will sit and wait for you.        After study, can you please clarify if there was a UTI or not?    A.	UTI ruled out  B.	UTI ruled in  C.	Other, please specify  D.	Not clinically significant    Supporting Documentations:    1/26/20 Progress Note Hospitalist:  UTI  -urine cx pos for Klebsiella pneumonia, sensitive to cipro  -start abx  SOB  -likely 2/2 Flu with ?asthma or COPD  -Pulm recs appreciated  -cont nebs  -outpt PFTs  -steroids if wheezing continues    1/27/20 Progress Note ID;  - Urine culture with Klebsiella   - UA with no UTI  - D/C Cipro    Discharge Note Provider:  Hospital Course  Urine cx with [id number] CFU Klebsiella pneumoniae. As per ID, neg UA, no abx needed.  UTI  -urine cx pos for Klebsiella pneumonia, UA neg  -no abx needed as per ID

## 2020-02-07 ENCOUNTER — TELEPHONE (OUTPATIENT)
Dept: INTERNAL MEDICINE | Facility: CLINIC | Age: 85
End: 2020-02-07

## 2020-02-07 ENCOUNTER — TELEPHONE (OUTPATIENT)
Dept: DERMATOLOGY | Facility: CLINIC | Age: 85
End: 2020-02-07

## 2020-02-07 RX ORDER — VALACYCLOVIR HYDROCHLORIDE 1 G/1
TABLET, FILM COATED ORAL
Qty: 15 TABLET | Refills: 0 | Status: SHIPPED | OUTPATIENT
Start: 2020-02-07 | End: 2020-02-20 | Stop reason: ALTCHOICE

## 2020-02-07 RX ORDER — VALACYCLOVIR HYDROCHLORIDE 1 G/1
1000 TABLET, FILM COATED ORAL 3 TIMES DAILY
Qty: 15 TABLET | Refills: 0 | Status: SHIPPED | OUTPATIENT
Start: 2020-02-07 | End: 2020-02-20 | Stop reason: ALTCHOICE

## 2020-02-07 NOTE — TELEPHONE ENCOUNTER
----- Message from Naida Bruce sent at 2/7/2020  8:26 AM CST -----  Contact: Patient [phone]  Patient stated that her rash is not as red as it was. Skin is still sensitive but feels much better. Stated that she has enough Rx to last till tomorrow. Does she need a refill or is the cream Rx enough?    Please call and advise.    Thank You

## 2020-02-07 NOTE — TELEPHONE ENCOUNTER
Called PT and told her that it is ok to come in for laser as long as shingles were not on her face.

## 2020-02-07 NOTE — TELEPHONE ENCOUNTER
----- Message from Donna Slater sent at 2/7/2020  2:30 PM CST -----  Contact: Jenniffer [phone]  Patient is calling because she is almost out of valACYclovir (VALTREX) 1000 MG tablet, and she wants to know if she should request a refill or not. Please call and advise.

## 2020-02-07 NOTE — TELEPHONE ENCOUNTER
----- Message from Zev Ashton sent at 2/7/2020  2:48 PM CST -----  Contact: Patient [phone]  RX request - refill or new RX.  Is this a refill or new RX:  refill  RX name and strength: valACYclovir (VALTREX) 1000 MG tablet      Pharmacy name and phone # [id number] - [place] - [address] AT [facility] [address] [phone] (Phone)  [phone] (Fax)    Comments:  Patient stating the Rx was called in on 2/04/20 for only 15 Tab, takes 3 times per day, running out will not have any more for the weekend, stating does has shingles and need the Rx called in prior to closing, would like a call back to inform has been sent.    Please call an advise  Thank you

## 2020-02-07 NOTE — TELEPHONE ENCOUNTER
----- Message from Thiago Sood sent at 2/7/2020  8:32 AM CST -----  Contact: Self   Patient state the rash is not as red and  better, the skin is still sensitive but feel better. Patient state she have enough medication until Saturday and is asking should she get a refill. Please call and advise.

## 2020-02-07 NOTE — TELEPHONE ENCOUNTER
----- Message from Ryan Loomis sent at 2/7/2020  9:58 AM CST -----  Contact: Pt   Name of Who is Calling:  CASTILLO ARREDONDO [[id number]]    What is the request in detail: Patient is requesting a call back Please contact to further discuss and advise      Can the clinic reply by [facility]: No     What Number to Call Back if not in [facility]:  [phone] (home)

## 2020-02-14 ENCOUNTER — PROCEDURE VISIT (OUTPATIENT)
Dept: DERMATOLOGY | Facility: CLINIC | Age: 85
End: 2020-02-14
Payer: MEDICARE

## 2020-02-14 DIAGNOSIS — I78.1 TELANGIECTASIA: ICD-10-CM

## 2020-02-14 DIAGNOSIS — Z41.1 ELECTIVE PROCEDURE FOR UNACCEPTABLE COSMETIC APPEARANCE: Primary | ICD-10-CM

## 2020-02-14 PROCEDURE — 17999 UNLISTD PX SKN MUC MEMB SUBQ: CPT | Mod: CSM,S$GLB,, | Performed by: DERMATOLOGY

## 2020-02-14 PROCEDURE — 17999 PR V BEAM, 0-25 PULSES: ICD-10-PCS | Mod: CSM,S$GLB,, | Performed by: DERMATOLOGY

## 2020-02-14 PROCEDURE — 99499 NO LOS: ICD-10-PCS | Mod: CSM,S$GLB,, | Performed by: DERMATOLOGY

## 2020-02-14 PROCEDURE — 99499 UNLISTED E&M SERVICE: CPT | Mod: CSM,S$GLB,, | Performed by: DERMATOLOGY

## 2020-02-17 ENCOUNTER — OFFICE VISIT (OUTPATIENT)
Dept: PODIATRY | Facility: CLINIC | Age: 85
End: 2020-02-17
Payer: MEDICARE

## 2020-02-17 VITALS
DIASTOLIC BLOOD PRESSURE: 60 MMHG | HEART RATE: 72 BPM | WEIGHT: 180 LBS | SYSTOLIC BLOOD PRESSURE: 130 MMHG | HEIGHT: 66 IN | BODY MASS INDEX: 28.93 KG/M2

## 2020-02-17 DIAGNOSIS — G60.9 IDIOPATHIC PERIPHERAL NEUROPATHY: Primary | ICD-10-CM

## 2020-02-17 DIAGNOSIS — M19.90 ARTHRITIS: ICD-10-CM

## 2020-02-17 DIAGNOSIS — M19.079 ANKLE ARTHRITIS: ICD-10-CM

## 2020-02-17 DIAGNOSIS — B35.1 ONYCHOMYCOSIS DUE TO DERMATOPHYTE: ICD-10-CM

## 2020-02-17 DIAGNOSIS — M48.061 SPINAL STENOSIS OF LUMBAR REGION, UNSPECIFIED WHETHER NEUROGENIC CLAUDICATION PRESENT: ICD-10-CM

## 2020-02-17 PROCEDURE — 1125F PR PAIN SEVERITY QUANTIFIED, PAIN PRESENT: ICD-10-PCS | Mod: HCNC,S$GLB,, | Performed by: STUDENT IN AN ORGANIZED HEALTH CARE EDUCATION/TRAINING PROGRAM

## 2020-02-17 PROCEDURE — 1101F PR PT FALLS ASSESS DOC 0-1 FALLS W/OUT INJ PAST YR: ICD-10-PCS | Mod: HCNC,CPTII,S$GLB, | Performed by: STUDENT IN AN ORGANIZED HEALTH CARE EDUCATION/TRAINING PROGRAM

## 2020-02-17 PROCEDURE — 99999 PR PBB SHADOW E&M-EST. PATIENT-LVL III: CPT | Mod: PBBFAC,HCNC,, | Performed by: STUDENT IN AN ORGANIZED HEALTH CARE EDUCATION/TRAINING PROGRAM

## 2020-02-17 PROCEDURE — 99499 RISK ADDL DX/OHS AUDIT: ICD-10-PCS | Mod: HCNC,S$GLB,, | Performed by: STUDENT IN AN ORGANIZED HEALTH CARE EDUCATION/TRAINING PROGRAM

## 2020-02-17 PROCEDURE — 1101F PT FALLS ASSESS-DOCD LE1/YR: CPT | Mod: HCNC,CPTII,S$GLB, | Performed by: STUDENT IN AN ORGANIZED HEALTH CARE EDUCATION/TRAINING PROGRAM

## 2020-02-17 PROCEDURE — 11721 DEBRIDE NAIL 6 OR MORE: CPT | Mod: HCNC,S$GLB,, | Performed by: STUDENT IN AN ORGANIZED HEALTH CARE EDUCATION/TRAINING PROGRAM

## 2020-02-17 PROCEDURE — 99203 PR OFFICE/OUTPT VISIT, NEW, LEVL III, 30-44 MIN: ICD-10-PCS | Mod: 25,HCNC,S$GLB, | Performed by: STUDENT IN AN ORGANIZED HEALTH CARE EDUCATION/TRAINING PROGRAM

## 2020-02-17 PROCEDURE — 1159F PR MEDICATION LIST DOCUMENTED IN MEDICAL RECORD: ICD-10-PCS | Mod: HCNC,S$GLB,, | Performed by: STUDENT IN AN ORGANIZED HEALTH CARE EDUCATION/TRAINING PROGRAM

## 2020-02-17 PROCEDURE — 99203 OFFICE O/P NEW LOW 30 MIN: CPT | Mod: 25,HCNC,S$GLB, | Performed by: STUDENT IN AN ORGANIZED HEALTH CARE EDUCATION/TRAINING PROGRAM

## 2020-02-17 PROCEDURE — 1125F AMNT PAIN NOTED PAIN PRSNT: CPT | Mod: HCNC,S$GLB,, | Performed by: STUDENT IN AN ORGANIZED HEALTH CARE EDUCATION/TRAINING PROGRAM

## 2020-02-17 PROCEDURE — 1159F MED LIST DOCD IN RCRD: CPT | Mod: HCNC,S$GLB,, | Performed by: STUDENT IN AN ORGANIZED HEALTH CARE EDUCATION/TRAINING PROGRAM

## 2020-02-17 PROCEDURE — 99499 UNLISTED E&M SERVICE: CPT | Mod: HCNC,S$GLB,, | Performed by: STUDENT IN AN ORGANIZED HEALTH CARE EDUCATION/TRAINING PROGRAM

## 2020-02-17 PROCEDURE — 99999 PR PBB SHADOW E&M-EST. PATIENT-LVL III: ICD-10-PCS | Mod: PBBFAC,HCNC,, | Performed by: STUDENT IN AN ORGANIZED HEALTH CARE EDUCATION/TRAINING PROGRAM

## 2020-02-17 PROCEDURE — 11721 ROUTINE FOOT CARE: ICD-10-PCS | Mod: HCNC,S$GLB,, | Performed by: STUDENT IN AN ORGANIZED HEALTH CARE EDUCATION/TRAINING PROGRAM

## 2020-02-17 RX ORDER — CICLOPIROX 80 MG/ML
SOLUTION TOPICAL NIGHTLY
Qty: 1 BOTTLE | Refills: 0 | Status: SHIPPED | OUTPATIENT
Start: 2020-02-17 | End: 2020-06-16

## 2020-02-17 RX ORDER — DICLOFENAC SODIUM 10 MG/G
2 GEL TOPICAL 3 TIMES DAILY
Qty: 1 TUBE | Refills: 0 | Status: SHIPPED | OUTPATIENT
Start: 2020-02-17 | End: 2020-12-17

## 2020-02-17 NOTE — PROCEDURES
Routine Foot Care  Date/Time: 2/17/2020 1:00 PM  Performed by: Hilda Vences DPM  Authorized by: Hilda Vences DPM     Consent Done?:  Yes (Verbal)    Nail Care Type:  Debride  Location(s): All  (Left 1st Toe, Left 3rd Toe, Left 2nd Toe, Left 4th Toe, Left 5th Toe, Right 1st Toe, Right 2nd Toe, Right 3rd Toe, Right 4th Toe and Right 5th Toe)  Patient tolerance:  Patient tolerated the procedure well with no immediate complications

## 2020-02-17 NOTE — PROGRESS NOTES
Subjective:      Patient ID: Jenniffer Jimeenz is a 87 y.o. female.    Chief Complaint: Foot Pain (right foot) and Ankle Pain    Jenniffer is a 87 y.o. female who presents to the clinic upon referral from Dr. Crowell  for evaluation and treatment of diabetic feet. Jenniffer has a past medical history of Amblyopia of left eye (4/10/2013), Anticoagulant long-term use, Anxiety, Arthritis, Atherosclerosis of aorta, Atrial fibrillation, Auricular fibrillation, Central retinal vein occlusion of left eye, CHF (congestive heart failure), Chronic kidney disease, stage 3, Coronary artery disease, Depression, Diastolic heart failure (8/20/2014), Exotropia of both eyes (2/6/2013), Hearing loss, History of resection of small bowel, Hyperlipidemia, Hypertension, Hypertensive retinopathy of both eyes, Hypoglycemia, Macular degeneration, Meniere's disease of both ears, OA (osteoarthritis) of shoulder, Osteoporosis, Other and unspecified hyperlipidemia, Posterior vitreous detachment of both eyes, Rhinitis, and TIA (transient ischemic attack). Patient relates no major problem with feet. Only complaints today consist of elongated, thickened toe nails. States she can cut them due to periphernal neuropathy. Also states has right ankle pain, hurts worse at night. Denies recent trauma, no other pedal complaints.     PCP: Rubi Young,     Date Last Seen by PCP: See epic    Current shoe gear: Casual shoes    Hemoglobin A1C   Date Value Ref Range Status   10/03/2019 6.0 (H) 4.0 - 5.6 % Final     Comment:     ADA Screening Guidelines:  5.7-6.4%  Consistent with prediabetes  >or=6.5%  Consistent with diabetes  High levels of fetal hemoglobin interfere with the HbA1C  assay. Heterozygous hemoglobin variants (HbS, HgC, etc)do  not significantly interfere with this assay.   However, presence of multiple variants may affect accuracy.     12/03/2018 5.9 (H) 4.0 - 5.6 % Final     Comment:     ADA Screening Guidelines:  5.7-6.4%  Consistent with  prediabetes  >or=6.5%  Consistent with diabetes  High levels of fetal hemoglobin interfere with the HbA1C  assay. Heterozygous hemoglobin variants (HbS, HgC, etc)do  not significantly interfere with this assay.   However, presence of multiple variants may affect accuracy.     07/20/2018 5.8 (H) 4.0 - 5.6 % Final     Comment:     ADA Screening Guidelines:  5.7-6.4%  Consistent with prediabetes  >or=6.5%  Consistent with diabetes  High levels of fetal hemoglobin interfere with the HbA1C  assay. Heterozygous hemoglobin variants (HbS, HgC, etc)do  not significantly interfere with this assay.   However, presence of multiple variants may affect accuracy.             Review of Systems   Constitution: Negative for chills, decreased appetite, diaphoresis and fever.   HENT: Negative for congestion and hearing loss.    Cardiovascular: Negative for chest pain, claudication, leg swelling and syncope.   Respiratory: Negative for cough and shortness of breath.    Skin: Positive for nail changes. Negative for color change, dry skin, flushing, itching, poor wound healing, rash, suspicious lesions and unusual hair distribution.   Musculoskeletal: Positive for joint pain, joint swelling and myalgias.   Gastrointestinal: Negative for nausea and vomiting.   Neurological: Positive for numbness. Negative for loss of balance, paresthesias and sensory change.   Psychiatric/Behavioral: Negative for altered mental status, suicidal ideas and thoughts of violence. The patient is not nervous/anxious.            Objective:      Physical Exam   Constitutional: She is oriented to person, place, and time. She appears well-developed and well-nourished. No distress.   Cardiovascular: Intact distal pulses.   Pulses:       Dorsalis pedis pulses are 2+ on the right side, and 2+ on the left side.        Posterior tibial pulses are 1+ on the right side, and 1+ on the left side.       Skin temperature is within normal limits. Toes are cool to touch and feet  are warm proximally. Hair growth is diminished. Skin is mildly atrophic and with mild hyperpigmentation. Mild edema noted, dean.    Musculoskeletal: She exhibits no edema or tenderness.        Right foot: There is decreased range of motion and deformity.        Left foot: There is decreased range of motion and deformity.   POP to R ankle joint and with ROM to right ankle. No crepitus noted. No erythema/edema noted, appears stable, no SOI   Feet:   Right Foot:   Skin Integrity: Positive for dry skin. Negative for blister, erythema or warmth.   Left Foot:   Skin Integrity: Positive for dry skin. Negative for blister, erythema or warmth.   Lymphadenopathy:   Negative lymphadenopathy bilateral popliteal fossa and tarsal tunnel.    Neurological: She is alert and oriented to person, place, and time. She has normal strength. No sensory deficit. She exhibits normal muscle tone.     [place] 5.07 monofilamant testing diminished. Sharp/dull sensation diminished bilaterally. Light touch sensation is diminished bilaterally. Vibratory sensation is diminished dean.    Skin: Skin is warm and dry. No abrasion, no bruising, no burn, no laceration, no petechiae and no rash noted. She is not diaphoretic. No cyanosis or erythema. No pallor. Nails show no clubbing.   Skin is warm and dry, bilaterally, no acute SOI noted, bilaterally, appears stable.         Nails are thickened by 2-4 mm's, dystrophic, and are darkened in coloration with subungual fungal debris.        Psychiatric: She has a normal mood and affect. Her behavior is normal. Judgment and thought content normal.             Assessment:       Encounter Diagnoses   Name Primary?    Idiopathic peripheral neuropathy Yes    Arthritis     Spinal stenosis of lumbar region, unspecified whether neurogenic claudication present     Ankle arthritis     Onychomycosis due to dermatophyte          Plan:       Jenniffer was seen today for foot pain and ankle pain.    Diagnoses and all  orders for this visit:    Idiopathic peripheral neuropathy  -     Routine Foot Care    Arthritis    Spinal stenosis of lumbar region, unspecified whether neurogenic claudication present    Ankle arthritis    Onychomycosis due to dermatophyte    Other orders  -     ciclopirox (PENLAC) 8 % Soln; Apply topically nightly.  -     diclofenac sodium (VOLTAREN) 1 % Gel; Apply 2 g topically 3 (three) times daily. Apply to ankle for pain      I counseled the patient on her conditions, their implications and medical management.    Nails debrided, see procedure note.     Dispensed ankle brace for ankle arthritis, pt instructed on RICE therapy, if fails to improve will consider ankle injection or AFO.    Shoe inspection. General Foot Education. Patient instructed on proper foot hygeine. We discussed wearing proper shoe gear, daily foot inspections, never walking without protective shoe gear, never putting sharp instruments to feet. Patient reminded of the importance of good nutrition, weight loss if needed to help alleviate foot pressure/pain     Discussed general foot care:  Wear comfortable, proper fitting shoes. Wash feet daily. Dry well. After drying, apply moisturizer to feet (no lotion to webspaces). Inspect feet daily for skin breaks, blisters, swelling, or redness. Wear cotton socks (preferably white)  Change socks every day. Do NOT walk barefoot. Do NOT use heating pads or warm/hot water soaks. I discussed with the  patient  signs and symptoms of infection including redness, drainage, purulence, odor, pain, elevated BS, streaking, fever, chills, etc . Patient is to seek medical attention (ER or urgent care) if these symptoms occur       Recommend supportive tennis shoes for foot pain. Recommend shoes with small heel, rigid arch support and wide toe box to accommodate foot type.     RTC PRN

## 2020-02-18 ENCOUNTER — TELEPHONE (OUTPATIENT)
Dept: INTERNAL MEDICINE | Facility: CLINIC | Age: 85
End: 2020-02-18

## 2020-02-18 NOTE — TELEPHONE ENCOUNTER
----- Message from Yuval Jordan sent at 2/18/2020  2:51 PM CST -----  Contact: Pt [phone]  Patient is returning a phone call.  Who left a message for the patient: Karen  Does patient know what this is regarding:  No  Comments: she said the message didn't come thru

## 2020-02-18 NOTE — TELEPHONE ENCOUNTER
----- Message from Olimpia Moore sent at 2/18/2020  7:56 AM CST -----  Contact: self/ [phone]  Patient would like to get medical advice.  Symptoms (please be specific):    How long has patient had these symptoms:    Pharmacy name and phone # (copy/paste from chart):  [facility] - [facility] [address] [facility] [phone] (Phone)  [phone] (Fax)  Any drug allergies (copy/paste from chart):      Would the patient rather a call back or a response via [facility]?:    Comments:  Patient have the shingles and she will be out of the pills and the rash is 3/4 gone, patient would like to know if she is to stop taking the pills or order more. and continue with the cream, please call and advise. Patient has 3 pills left and just use the cream.

## 2020-02-18 NOTE — TELEPHONE ENCOUNTER
Pt does not need anymore Valtrex at this point. Continue with the steroid cream as needed and Tylenol for the pain

## 2020-02-18 NOTE — TELEPHONE ENCOUNTER
Patient has the shingles and she will be out of the pills and the rash is 3/4 gone, patient would like to know if she is to stop taking the pills or order more. and continue with the cream, please call and advise. Patient has 3 pills left and just use the cream

## 2020-02-18 NOTE — TELEPHONE ENCOUNTER
Left detailed message no longer needs Valtrex, continue the steroid crean and take Tylenol , follow the package directions

## 2020-02-20 ENCOUNTER — CLINICAL SUPPORT (OUTPATIENT)
Dept: ALLERGY | Facility: CLINIC | Age: 85
End: 2020-02-20
Payer: MEDICARE

## 2020-02-20 ENCOUNTER — OFFICE VISIT (OUTPATIENT)
Dept: ALLERGY | Facility: CLINIC | Age: 85
End: 2020-02-20
Payer: MEDICARE

## 2020-02-20 VITALS — HEIGHT: 66 IN | WEIGHT: 180.13 LBS | BODY MASS INDEX: 28.95 KG/M2

## 2020-02-20 DIAGNOSIS — Z51.6 ALLERGY DESENSITIZATION THERAPY: ICD-10-CM

## 2020-02-20 DIAGNOSIS — H81.09 MENIERE'S DISEASE, UNSPECIFIED LATERALITY: ICD-10-CM

## 2020-02-20 DIAGNOSIS — J30.9 CHRONIC ALLERGIC RHINITIS: ICD-10-CM

## 2020-02-20 DIAGNOSIS — H90.3 SENSORINEURAL HEARING LOSS (SNHL) OF BOTH EARS: ICD-10-CM

## 2020-02-20 DIAGNOSIS — J30.9 CHRONIC ALLERGIC RHINITIS: Primary | ICD-10-CM

## 2020-02-20 PROCEDURE — 1126F PR PAIN SEVERITY QUANTIFIED, NO PAIN PRESENT: ICD-10-PCS | Mod: HCNC,S$GLB,, | Performed by: ALLERGY & IMMUNOLOGY

## 2020-02-20 PROCEDURE — 99999 PR PBB SHADOW E&M-EST. PATIENT-LVL II: CPT | Mod: PBBFAC,HCNC,, | Performed by: ALLERGY & IMMUNOLOGY

## 2020-02-20 PROCEDURE — 99499 NO LOS: ICD-10-PCS | Mod: HCNC,S$GLB,, | Performed by: ALLERGY & IMMUNOLOGY

## 2020-02-20 PROCEDURE — 1159F MED LIST DOCD IN RCRD: CPT | Mod: HCNC,S$GLB,, | Performed by: ALLERGY & IMMUNOLOGY

## 2020-02-20 PROCEDURE — 99999 PR PBB SHADOW E&M-EST. PATIENT-LVL II: ICD-10-PCS | Mod: PBBFAC,HCNC,, | Performed by: ALLERGY & IMMUNOLOGY

## 2020-02-20 PROCEDURE — 99214 OFFICE O/P EST MOD 30 MIN: CPT | Mod: HCNC,25,S$GLB, | Performed by: ALLERGY & IMMUNOLOGY

## 2020-02-20 PROCEDURE — 95115 IMMUNOTHERAPY ONE INJECTION: CPT | Mod: HCNC,S$GLB,, | Performed by: ALLERGY & IMMUNOLOGY

## 2020-02-20 PROCEDURE — 95115 PR IMMUNOTHERAPY, ONE INJECTION: ICD-10-PCS | Mod: HCNC,S$GLB,, | Performed by: ALLERGY & IMMUNOLOGY

## 2020-02-20 PROCEDURE — 99499 UNLISTED E&M SERVICE: CPT | Mod: HCNC,S$GLB,, | Performed by: ALLERGY & IMMUNOLOGY

## 2020-02-20 PROCEDURE — 99214 PR OFFICE/OUTPT VISIT, EST, LEVL IV, 30-39 MIN: ICD-10-PCS | Mod: HCNC,25,S$GLB, | Performed by: ALLERGY & IMMUNOLOGY

## 2020-02-20 PROCEDURE — 1159F PR MEDICATION LIST DOCUMENTED IN MEDICAL RECORD: ICD-10-PCS | Mod: HCNC,S$GLB,, | Performed by: ALLERGY & IMMUNOLOGY

## 2020-02-20 PROCEDURE — 1126F AMNT PAIN NOTED NONE PRSNT: CPT | Mod: HCNC,S$GLB,, | Performed by: ALLERGY & IMMUNOLOGY

## 2020-02-20 PROCEDURE — 1101F PR PT FALLS ASSESS DOC 0-1 FALLS W/OUT INJ PAST YR: ICD-10-PCS | Mod: HCNC,CPTII,S$GLB, | Performed by: ALLERGY & IMMUNOLOGY

## 2020-02-20 PROCEDURE — 1101F PT FALLS ASSESS-DOCD LE1/YR: CPT | Mod: HCNC,CPTII,S$GLB, | Performed by: ALLERGY & IMMUNOLOGY

## 2020-02-20 NOTE — PROGRESS NOTES
ALLERGY & IMMUNOLOGY CLINIC - FOLLOW UP      HISTORY OF PRESENT ILLNESS      Patient ID: [place] Liam Jimenez is a 86 y.o. female     CC: chronic rhinitis and follow up of AIT     HPI: Miss Jimenez was originally seen by Dr. Ragsdale in 1982, and has been on AIT, maintenance therapy for Alternaria, Fusarium, D. Farinae and D. Pteronyssinus since 1982. She was last seen by Dr Hernandez and is new to me.  Patient had severe Meniere's disease, s/p aural shunt by Dr. Mabry with improvement, and was reportedly told that she would need lifelong immunotherapy. Reports symptom return of dizziness, vertigo and worsening imbalance when IT was temporarily held. Other than during this period, dizziness is controlled, although patient does walk with walker due to chronic imbalance. Additional symptoms when IT was held include nasal congestion and sneezing. Current nasal symptoms are well controlled on IT. Uses fluticasone 1 SEN daily for nasal congestion with good relief of her symptoms. Patient reports tolerates AIT well she has had no reactions to shots, no itch, no hives, no swelling, no SOB, no wheeze, no dizziness. She recently had shingles so missed couple weeks of shots and she has new vial.         REVIEW OF SYSTEMS      CONST: no F/C/NS, no unintentional weight changes  NEURO: no H/A, no weakness, no paresthesias  EYES: no discharge, no pruritus, no erythema  EARS: no hearing loss, no sensation of fullness  NOSE: + congestion, no rhinorrhea, no discharge, no itching, no sneezing  PULM: no acute SOB, no wheezing, no cough, no snoring  CV: no CP, no palpitations, no leg swelling  GI: no dysphagia, no heartburn, no pain, no N/V/D, no BRBPR/melena  URO: no dysuria, no hematuria, no nocturia  MSK: no joint pain, no muscle pain  DERM: no rashes, no skin breaks      MEDICAL HISTORY      MedHx: Meniere's disease,  Allergic rhinitis, hearing loss, atrial fibrillation, HF active problems reviewed  SurgHx: cardiac catheterization, ear  "Sx, sinus Sx, strabismus  SocHx: EtOH, tobacco, IVDU: negative x3, (tobacco cessation 1982)  FamHx: deafness in grandfather, mother and brother with diminished hearing  Seasonal allergies   Allergies: see below  Medications: MAR reviewed  Vaccines:reports up to date on  flu shot, pneumonia shot     H/o Asthma: denies  H/o Eczema: denies  H/o Rhinitis: seasonal  Oral Allergy:  denies  Food Allergy: denies  Venom Allergy: denies  Latex Allergy: denies  Other Allergies:  Bactrim: severe arthritis  Dramamine: remote  Tramadol: hallucination  Env/Occ: denies any evironmental or occupational exposures      PHYSICAL EXAM      Ht 5' 5.98" (1.676 m)   Wt 81.7 kg (180 lb 1.9 oz)   LMP  (LMP Unknown)   BMI 29.09 kg/m²   GEN: Alert, oriented, no acute distress, walks with a walker  EYES: PERRL, EOMI, no conjunctival injection, no discharge, wears glasses  EARS: decreased hearing b/l  NOSE: turbinates 2-3+ pink B/L, no stringing mucous, no polyps  MOUTH: MMM, no ulcers, no thrush, no cobblestoning  NECK: supple, trachea midline, no thyromegaly, no LAD  LUNGS: CTAB, no w/r/c, no increased WOB  HEART: irregularly irregular,  normal S1/S2, no m/g/r  EXTREMITIES: +2 distal pulses, no c/c/e  LYMPHATICS: no cervical/submandibular LAD  DERM: no rashes, no skin breaks, no dystrophic fingernails  NEURO: abnormal gait, no facial asymmetry      LABORATORY STUDIES      January 2018: Cbc and CMP and TSH unremarkable      ALLERGEN TESTING      Skin Prick: records unavailable, remote history of prior SPT     Immunocaps: none to date      CHART REVIEW      Reviewed IT record sheets      ASSESSMENT & PLAN      Jenniffer Jimenez is a 84 y.o. female with      Allergic rhinitis due to pollen     Meniere's disease, unspecified laterality     1. Allergic rhinits - controlled on AIT. Nasal symptoms returned with temporary discontinuation of therapy. Patient denies local of systemic reaction to AIT.               - continue  AIT, maintenance " therapy for Alternaria, Fusarium, D. Farinae and D. Pteronyssinus               - annual follow up     2. Meniere's disease - symptoms well controlled s/p aural shunt and AIT. Symptoms of dizzines, vertigo and worsening imbalance with temporary discontinuation of therapy              - AIT as above      Follow up:  One year

## 2020-02-20 NOTE — PROGRESS NOTES
..See Media Tab for Allergy Injection information. Injection given at office visit today per Dr. Franco.    1:1 0.25mL given SQ upper L arm  Patient tolerated well.

## 2020-02-28 ENCOUNTER — ANTI-COAG VISIT (OUTPATIENT)
Dept: CARDIOLOGY | Facility: CLINIC | Age: 85
End: 2020-02-28
Payer: MEDICARE

## 2020-02-28 DIAGNOSIS — Z79.01 LONG TERM (CURRENT) USE OF ANTICOAGULANTS: Primary | ICD-10-CM

## 2020-02-28 DIAGNOSIS — I48.20 CHRONIC ATRIAL FIBRILLATION: ICD-10-CM

## 2020-02-28 DIAGNOSIS — Z79.01 CURRENT USE OF LONG TERM ANTICOAGULATION: ICD-10-CM

## 2020-02-28 LAB — INR PPP: 2.8 (ref 2–3)

## 2020-02-28 PROCEDURE — 85610 POCT INR: ICD-10-PCS | Mod: QW,HCNC,S$GLB, | Performed by: INTERNAL MEDICINE

## 2020-02-28 PROCEDURE — 93793 PR ANTICOAGULANT MGMT FOR PT TAKING WARFARIN: ICD-10-PCS | Mod: HCNC,S$GLB,,

## 2020-02-28 PROCEDURE — 93793 ANTICOAG MGMT PT WARFARIN: CPT | Mod: HCNC,S$GLB,,

## 2020-02-28 PROCEDURE — 85610 PROTHROMBIN TIME: CPT | Mod: QW,HCNC,S$GLB, | Performed by: INTERNAL MEDICINE

## 2020-02-28 RX ORDER — WARFARIN 3 MG/1
TABLET ORAL
Qty: 140 TABLET | Refills: 3 | Status: ON HOLD | OUTPATIENT
Start: 2020-02-28 | End: 2020-06-26 | Stop reason: HOSPADM

## 2020-02-28 NOTE — PROGRESS NOTES
INR at goal. Medications and chart reviewed. No changes noted to necessitate adjustment of warfarin or follow-up plan. See calendar.  Pt has bruising due to use; pt states she is on the fasting diet to lose weight; she had an increase of alcohol intake due to her birthday being this week; & denies any other changes.   Maintain & re-assess in 5 weeks.   Patient was re-educated on situations that would require placing a call to the Coumadin Clinic, including bleeding or unusual bruising issues, changes in health, diet or medications,upcoming procedures that require warfarin interruption, and missed Coumadin dose(s). Patient expressed understanding that avoidance of consistency with these parameters could cause fluctuations in INR, leading to more frequent visits and increase risk of adverse events.

## 2020-03-02 ENCOUNTER — CLINICAL SUPPORT (OUTPATIENT)
Dept: INTERNAL MEDICINE | Facility: CLINIC | Age: 85
End: 2020-03-02
Payer: MEDICARE

## 2020-03-02 DIAGNOSIS — J30.9 CHRONIC ALLERGIC RHINITIS: ICD-10-CM

## 2020-03-02 PROCEDURE — 99499 NO LOS: ICD-10-PCS | Mod: HCNC,S$GLB,, | Performed by: ALLERGY & IMMUNOLOGY

## 2020-03-02 PROCEDURE — 95115 PR IMMUNOTHERAPY, ONE INJECTION: ICD-10-PCS | Mod: HCNC,S$GLB,, | Performed by: FAMILY MEDICINE

## 2020-03-02 PROCEDURE — 99499 UNLISTED E&M SERVICE: CPT | Mod: HCNC,S$GLB,, | Performed by: ALLERGY & IMMUNOLOGY

## 2020-03-02 PROCEDURE — 95115 IMMUNOTHERAPY ONE INJECTION: CPT | Mod: HCNC,S$GLB,, | Performed by: FAMILY MEDICINE

## 2020-03-09 ENCOUNTER — CLINICAL SUPPORT (OUTPATIENT)
Dept: INTERNAL MEDICINE | Facility: CLINIC | Age: 85
End: 2020-03-09
Payer: MEDICARE

## 2020-03-09 DIAGNOSIS — J30.9 CHRONIC ALLERGIC RHINITIS: ICD-10-CM

## 2020-03-09 PROCEDURE — 95115 IMMUNOTHERAPY ONE INJECTION: CPT | Mod: HCNC,S$GLB,, | Performed by: FAMILY MEDICINE

## 2020-03-09 PROCEDURE — 99499 NO LOS: ICD-10-PCS | Mod: HCNC,S$GLB,, | Performed by: ALLERGY & IMMUNOLOGY

## 2020-03-09 PROCEDURE — 99499 UNLISTED E&M SERVICE: CPT | Mod: HCNC,S$GLB,, | Performed by: ALLERGY & IMMUNOLOGY

## 2020-03-09 PROCEDURE — 95115 PR IMMUNOTHERAPY, ONE INJECTION: ICD-10-PCS | Mod: HCNC,S$GLB,, | Performed by: FAMILY MEDICINE

## 2020-03-11 ENCOUNTER — TELEPHONE (OUTPATIENT)
Dept: INTERNAL MEDICINE | Facility: CLINIC | Age: 85
End: 2020-03-11

## 2020-03-11 NOTE — TELEPHONE ENCOUNTER
Please call pt. She has shingles and would like to be seen by someone ASAP. She wasn't sure if Dr oYung would be able to see her since she is pregnant.

## 2020-03-11 NOTE — TELEPHONE ENCOUNTER
----- Message from Thiago Sood sent at 3/11/2020  4:27 PM CDT -----  Contact: Self   Patient state she have shingles and wants to seen tomorrow. Patient have been advised the first available appointment is 03/17. Please call and advise.

## 2020-03-12 ENCOUNTER — OFFICE VISIT (OUTPATIENT)
Dept: INTERNAL MEDICINE | Facility: CLINIC | Age: 85
End: 2020-03-12
Payer: MEDICARE

## 2020-03-12 VITALS
HEIGHT: 66 IN | WEIGHT: 189.38 LBS | TEMPERATURE: 98 F | BODY MASS INDEX: 30.44 KG/M2 | DIASTOLIC BLOOD PRESSURE: 72 MMHG | HEART RATE: 68 BPM | RESPIRATION RATE: 18 BRPM | SYSTOLIC BLOOD PRESSURE: 138 MMHG

## 2020-03-12 DIAGNOSIS — Z23 NEED FOR SHINGLES VACCINE: ICD-10-CM

## 2020-03-12 DIAGNOSIS — B37.89 CANDIDIASIS OF BREAST: Primary | ICD-10-CM

## 2020-03-12 DIAGNOSIS — R35.0 URINARY FREQUENCY: ICD-10-CM

## 2020-03-12 PROCEDURE — 1159F MED LIST DOCD IN RCRD: CPT | Mod: HCNC,S$GLB,, | Performed by: INTERNAL MEDICINE

## 2020-03-12 PROCEDURE — 99214 PR OFFICE/OUTPT VISIT, EST, LEVL IV, 30-39 MIN: ICD-10-PCS | Mod: HCNC,S$GLB,, | Performed by: INTERNAL MEDICINE

## 2020-03-12 PROCEDURE — 1159F PR MEDICATION LIST DOCUMENTED IN MEDICAL RECORD: ICD-10-PCS | Mod: HCNC,S$GLB,, | Performed by: INTERNAL MEDICINE

## 2020-03-12 PROCEDURE — 99214 OFFICE O/P EST MOD 30 MIN: CPT | Mod: HCNC,S$GLB,, | Performed by: INTERNAL MEDICINE

## 2020-03-12 PROCEDURE — 1101F PT FALLS ASSESS-DOCD LE1/YR: CPT | Mod: HCNC,CPTII,S$GLB, | Performed by: INTERNAL MEDICINE

## 2020-03-12 PROCEDURE — 99999 PR PBB SHADOW E&M-EST. PATIENT-LVL I: ICD-10-PCS | Mod: PBBFAC,HCNC,, | Performed by: INTERNAL MEDICINE

## 2020-03-12 PROCEDURE — 99999 PR PBB SHADOW E&M-EST. PATIENT-LVL I: CPT | Mod: PBBFAC,HCNC,, | Performed by: INTERNAL MEDICINE

## 2020-03-12 PROCEDURE — 1101F PR PT FALLS ASSESS DOC 0-1 FALLS W/OUT INJ PAST YR: ICD-10-PCS | Mod: HCNC,CPTII,S$GLB, | Performed by: INTERNAL MEDICINE

## 2020-03-12 RX ORDER — KETOCONAZOLE 20 MG/G
CREAM TOPICAL 2 TIMES DAILY
Qty: 1 TUBE | Refills: 0 | Status: SHIPPED | OUTPATIENT
Start: 2020-03-12 | End: 2020-06-16

## 2020-03-12 NOTE — PROGRESS NOTES
Subjective:       Patient ID: Jenniffer Jimenez is a 88 y.o. female.    Chief Complaint: No chief complaint on file.    Patient is a 88 y.o.female who presents today for rash. Rash has been present for 4-5 days ago. The rash is under her breast bilaterally. It is painful. She tried   bactroban cream with no improvement. No fever or chills. Rash is red.     She is urinating more frequently.  No burning with urination.  Review of Systems   Constitutional: Negative for appetite change, chills, diaphoresis and fever.   HENT: Negative for congestion, ear discharge, ear pain, postnasal drip, tinnitus, trouble swallowing and voice change.    Eyes: Negative for discharge, redness and itching.   Respiratory: Negative for cough, chest tightness, shortness of breath and wheezing.    Cardiovascular: Negative for chest pain, palpitations and leg swelling.   Gastrointestinal: Negative for abdominal pain, constipation, diarrhea, nausea and vomiting.   Endocrine: Negative for cold intolerance and heat intolerance.   Genitourinary: Positive for frequency. Negative for difficulty urinating, flank pain, hematuria and urgency.   Musculoskeletal: Negative for arthralgias, gait problem, myalgias and neck stiffness.   Skin: Positive for rash. Negative for color change.   Neurological: Negative for dizziness, seizures, syncope and headaches.   Hematological: Negative for adenopathy.   Psychiatric/Behavioral: Negative for agitation, behavioral problems, confusion and sleep disturbance.       Objective:      Physical Exam   Constitutional: She is oriented to person, place, and time. She appears well-developed and well-nourished. No distress.   HENT:   Head: Normocephalic and atraumatic.   Right Ear: External ear normal.   Left Ear: External ear normal.   Nose: Nose normal.   Mouth/Throat: Oropharynx is clear and moist. No oropharyngeal exudate.   Eyes: Pupils are equal, round, and reactive to light. Conjunctivae and EOM are normal. Right  eye exhibits no discharge. Left eye exhibits no discharge. No scleral icterus.   Neck: Neck supple. No JVD present. No tracheal deviation present. No thyromegaly present.   Cardiovascular: Normal rate, normal heart sounds and intact distal pulses. Exam reveals no gallop and no friction rub.   No murmur heard.  Pulmonary/Chest: Effort normal and breath sounds normal. No stridor. No respiratory distress. She has no wheezes. She has no rales. She exhibits no tenderness.   Abdominal: Soft. Bowel sounds are normal. She exhibits no distension. There is no tenderness. There is no rebound.   Musculoskeletal: She exhibits no edema or tenderness.   Lymphadenopathy:     She has no cervical adenopathy.   Neurological: She is alert and oriented to person, place, and time.   Skin: Skin is warm and dry. Rash noted. She is not diaphoretic. No erythema.   Psychiatric: She has a normal mood and affect. Her behavior is normal.   Nursing note and vitals reviewed.      Assessment and Plan:       1. Candidiasis of breast    - ketoconazole (NIZORAL) 2 % cream; Apply topically 2 (two) times daily.  Dispense: 1 Tube; Refill: 0    2. Need for shingles vaccine    - varicella-zoster [id number], PF, (SHINGRIX, PF,) 50 mcg/0.5 mL injection; Inject 0.5 mLs into the muscle once. for 1 dose  Dispense: 0.5 mL; Refill: 1    3. Urinary frequency    - Urinalysis; Future  - Urine culture; Future          No follow-ups on file.

## 2020-03-14 RX ORDER — AMOXICILLIN AND CLAVULANATE POTASSIUM 875; 125 MG/1; MG/1
1 TABLET, FILM COATED ORAL 2 TIMES DAILY
Qty: 14 TABLET | Refills: 0 | Status: SHIPPED | OUTPATIENT
Start: 2020-03-14 | End: 2020-03-21

## 2020-03-16 ENCOUNTER — CLINICAL SUPPORT (OUTPATIENT)
Dept: INTERNAL MEDICINE | Facility: CLINIC | Age: 85
End: 2020-03-16
Payer: MEDICARE

## 2020-03-16 DIAGNOSIS — J30.9 CHRONIC ALLERGIC RHINITIS: ICD-10-CM

## 2020-03-16 PROCEDURE — 99499 NO LOS: ICD-10-PCS | Mod: HCNC,S$GLB,, | Performed by: ALLERGY & IMMUNOLOGY

## 2020-03-16 PROCEDURE — 99499 UNLISTED E&M SERVICE: CPT | Mod: HCNC,S$GLB,, | Performed by: ALLERGY & IMMUNOLOGY

## 2020-03-16 PROCEDURE — 95115 IMMUNOTHERAPY ONE INJECTION: CPT | Mod: HCNC,S$GLB,, | Performed by: FAMILY MEDICINE

## 2020-03-16 PROCEDURE — 95115 PR IMMUNOTHERAPY, ONE INJECTION: ICD-10-PCS | Mod: HCNC,S$GLB,, | Performed by: FAMILY MEDICINE

## 2020-03-16 NOTE — PROGRESS NOTES
Patient called 03/16/20 to inform us that she missed taking on 3/14/20 (Warfarin 4.5mg). What should she take today? Please advise.

## 2020-03-20 ENCOUNTER — TELEPHONE (OUTPATIENT)
Dept: ALLERGY | Facility: CLINIC | Age: 85
End: 2020-03-20

## 2020-03-20 RX ORDER — FUROSEMIDE 20 MG/1
TABLET ORAL
Qty: 100 TABLET | Refills: 3 | Status: SHIPPED | OUTPATIENT
Start: 2020-03-20 | End: 2021-03-15

## 2020-03-20 RX ORDER — POTASSIUM CHLORIDE 750 MG/1
CAPSULE, EXTENDED RELEASE ORAL
Qty: 90 CAPSULE | Refills: 3 | Status: SHIPPED | OUTPATIENT
Start: 2020-03-20 | End: 2020-12-17 | Stop reason: SDUPTHER

## 2020-03-20 NOTE — TELEPHONE ENCOUNTER
Spoke to pt, read Dr Franco's note, she is going to wait 5-6 weeks and then repeat 0.4 and go from there.

## 2020-03-20 NOTE — TELEPHONE ENCOUNTER
----- Message from Sagrario Patrick sent at 3/20/2020  2:43 PM CDT -----  Contact: pt   Pt is calling to speak with the nurse about her allergy appt next week pt is asking if she has to wait the whole thirty minutes for her injection due to the virus and if the pt can start giving the injections at home. 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 Can you please call pt at [phone].    LOU

## 2020-03-20 NOTE — TELEPHONE ENCOUNTER
Advised pt she does have to wait the whole 30 min and no she can not do it herself. Pt received vial 1:1-  0.4 last week and is scheduled for 0.5 next week. Told her we would discuss with  about spacing out so she doesn't have to risk exposure to virus    ----- Message from Sagrario Nguyen sent at 3/20/2020  2:43 PM CDT -----  Contact: pt   Pt is calling to speak with the nurse about her allergy appt next week pt is asking if she has to wait the whole thirty minutes for her injection due to the virus and if the pt can start giving the injections at home. 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 Can you please call pt at [phone].    LOU

## 2020-04-06 NOTE — ADDENDUM NOTE
Addended by: ALYSHA DOVE on: 4/6/2020 01:23 PM     Modules accepted: Orders     You can access the FollowMyHealth Patient Portal offered by [facility] by registering at the following website: [email or web address]. By joining [facility]’s FollowMyHealth portal, you will also be able to view your health information using other applications (apps) compatible with our system.

## 2020-04-06 NOTE — PROGRESS NOTES
4/6 - INR missed last week. Since levels generally in range will postpone next INR to 8 weeks since last (2/2 Covid concerns).

## 2020-04-16 ENCOUNTER — TELEPHONE (OUTPATIENT)
Dept: ALLERGY | Facility: CLINIC | Age: 85
End: 2020-04-16

## 2020-04-16 NOTE — TELEPHONE ENCOUNTER
----- Message from Columba Baldwin LPN sent at 4/15/2020  3:24 PM CDT -----  Contact: self/ [phone]  Pt said you said she could get adjusted dose, and spread it out to 8 weeks. Pt has appt on 4/27, could you please make adjustments in XIS so there won't be any confusion on 4/27.     Thanks    ----- Message -----  From: Shyla Palma  Sent: 4/15/2020   1:03 PM CDT  To: [facility]LS[facility] Staff    Pt states she wants to get allergy injection. Pt tried to schedule but it is asking for a referral. Please call and advise.

## 2020-04-16 NOTE — TELEPHONE ENCOUNTER
I will adjust XIS but she was not told 8 weeks was told 5-6 weeks. She was in process of building back up. She needs to repeat 0.4 from last time and will eventually need weekly to build back up

## 2020-04-21 ENCOUNTER — TELEPHONE (OUTPATIENT)
Dept: PODIATRY | Facility: CLINIC | Age: 85
End: 2020-04-21

## 2020-04-27 ENCOUNTER — ANTI-COAG VISIT (OUTPATIENT)
Dept: CARDIOLOGY | Facility: CLINIC | Age: 85
End: 2020-04-27
Payer: MEDICARE

## 2020-04-27 ENCOUNTER — LAB VISIT (OUTPATIENT)
Dept: LAB | Facility: HOSPITAL | Age: 85
End: 2020-04-27
Attending: INTERNAL MEDICINE
Payer: MEDICARE

## 2020-04-27 ENCOUNTER — CLINICAL SUPPORT (OUTPATIENT)
Dept: INTERNAL MEDICINE | Facility: CLINIC | Age: 85
End: 2020-04-27
Payer: MEDICARE

## 2020-04-27 DIAGNOSIS — J30.9 CHRONIC ALLERGIC RHINITIS: ICD-10-CM

## 2020-04-27 DIAGNOSIS — Z79.01 LONG TERM (CURRENT) USE OF ANTICOAGULANTS: ICD-10-CM

## 2020-04-27 DIAGNOSIS — Z79.01 CURRENT USE OF LONG TERM ANTICOAGULATION: ICD-10-CM

## 2020-04-27 DIAGNOSIS — I48.20 CHRONIC ATRIAL FIBRILLATION: ICD-10-CM

## 2020-04-27 LAB
INR PPP: 2.7 (ref 0.8–1.2)
PROTHROMBIN TIME: 25.8 SEC (ref 9–12.5)

## 2020-04-27 PROCEDURE — 36415 COLL VENOUS BLD VENIPUNCTURE: CPT | Mod: HCNC,PO

## 2020-04-27 PROCEDURE — 99499 NO LOS: ICD-10-PCS | Mod: HCNC,S$GLB,, | Performed by: ALLERGY & IMMUNOLOGY

## 2020-04-27 PROCEDURE — 93793 ANTICOAG MGMT PT WARFARIN: CPT | Mod: S$GLB,,,

## 2020-04-27 PROCEDURE — 95115 IMMUNOTHERAPY ONE INJECTION: CPT | Mod: HCNC,S$GLB,, | Performed by: FAMILY MEDICINE

## 2020-04-27 PROCEDURE — 85610 PROTHROMBIN TIME: CPT | Mod: HCNC

## 2020-04-27 PROCEDURE — 93793 PR ANTICOAGULANT MGMT FOR PT TAKING WARFARIN: ICD-10-PCS | Mod: S$GLB,,,

## 2020-04-27 PROCEDURE — 95115 PR IMMUNOTHERAPY, ONE INJECTION: ICD-10-PCS | Mod: HCNC,S$GLB,, | Performed by: FAMILY MEDICINE

## 2020-04-27 PROCEDURE — 99499 UNLISTED E&M SERVICE: CPT | Mod: HCNC,S$GLB,, | Performed by: ALLERGY & IMMUNOLOGY

## 2020-05-11 ENCOUNTER — CLINICAL SUPPORT (OUTPATIENT)
Dept: INTERNAL MEDICINE | Facility: CLINIC | Age: 85
End: 2020-05-11
Payer: MEDICARE

## 2020-05-11 ENCOUNTER — LAB VISIT (OUTPATIENT)
Dept: LAB | Facility: HOSPITAL | Age: 85
End: 2020-05-11
Attending: INTERNAL MEDICINE
Payer: MEDICARE

## 2020-05-11 ENCOUNTER — ANTI-COAG VISIT (OUTPATIENT)
Dept: CARDIOLOGY | Facility: CLINIC | Age: 85
End: 2020-05-11
Payer: MEDICARE

## 2020-05-11 DIAGNOSIS — I48.20 CHRONIC ATRIAL FIBRILLATION: ICD-10-CM

## 2020-05-11 DIAGNOSIS — Z79.01 CURRENT USE OF LONG TERM ANTICOAGULATION: ICD-10-CM

## 2020-05-11 DIAGNOSIS — Z79.01 LONG TERM (CURRENT) USE OF ANTICOAGULANTS: ICD-10-CM

## 2020-05-11 DIAGNOSIS — E78.00 PURE HYPERCHOLESTEROLEMIA: ICD-10-CM

## 2020-05-11 DIAGNOSIS — J30.9 CHRONIC ALLERGIC RHINITIS: ICD-10-CM

## 2020-05-11 DIAGNOSIS — I50.32 CHRONIC DIASTOLIC HEART FAILURE: ICD-10-CM

## 2020-05-11 LAB
ALT SERPL W/O P-5'-P-CCNC: 12 U/L (ref 10–44)
ANION GAP SERPL CALC-SCNC: 9 MMOL/L (ref 8–16)
AST SERPL-CCNC: 18 U/L (ref 10–40)
BUN SERPL-MCNC: 17 MG/DL (ref 8–23)
CALCIUM SERPL-MCNC: 8.9 MG/DL (ref 8.7–10.5)
CHLORIDE SERPL-SCNC: 102 MMOL/L (ref 95–110)
CHOLEST SERPL-MCNC: 176 MG/DL (ref 120–199)
CHOLEST/HDLC SERPL: 3 {RATIO} (ref 2–5)
CO2 SERPL-SCNC: 27 MMOL/L (ref 23–29)
CREAT SERPL-MCNC: 0.9 MG/DL (ref 0.5–1.4)
ERYTHROCYTE [DISTWIDTH] IN BLOOD BY AUTOMATED COUNT: 15 % (ref 11.5–14.5)
EST. GFR  (AFRICAN AMERICAN): >60 ML/MIN/1.73 M^2
EST. GFR  (NON AFRICAN AMERICAN): 57.3 ML/MIN/1.73 M^2
GLUCOSE SERPL-MCNC: 91 MG/DL (ref 70–110)
HCT VFR BLD AUTO: 36.8 % (ref 37–48.5)
HDLC SERPL-MCNC: 58 MG/DL (ref 40–75)
HDLC SERPL: 33 % (ref 20–50)
HGB BLD-MCNC: 11.6 G/DL (ref 12–16)
INR PPP: 2.1 (ref 0.8–1.2)
LDLC SERPL CALC-MCNC: 103.6 MG/DL (ref 63–159)
MCH RBC QN AUTO: 29.9 PG (ref 27–31)
MCHC RBC AUTO-ENTMCNC: 31.5 G/DL (ref 32–36)
MCV RBC AUTO: 95 FL (ref 82–98)
NONHDLC SERPL-MCNC: 118 MG/DL
PLATELET # BLD AUTO: 196 K/UL (ref 150–350)
PMV BLD AUTO: 11.1 FL (ref 9.2–12.9)
POTASSIUM SERPL-SCNC: 4.6 MMOL/L (ref 3.5–5.1)
PROTHROMBIN TIME: 20.2 SEC (ref 9–12.5)
RBC # BLD AUTO: 3.88 M/UL (ref 4–5.4)
SODIUM SERPL-SCNC: 138 MMOL/L (ref 136–145)
TRIGL SERPL-MCNC: 72 MG/DL (ref 30–150)
WBC # BLD AUTO: 3.25 K/UL (ref 3.9–12.7)

## 2020-05-11 PROCEDURE — 95115 PR IMMUNOTHERAPY, ONE INJECTION: ICD-10-PCS | Mod: HCNC,S$GLB,, | Performed by: FAMILY MEDICINE

## 2020-05-11 PROCEDURE — 80048 BASIC METABOLIC PNL TOTAL CA: CPT | Mod: HCNC

## 2020-05-11 PROCEDURE — 84450 TRANSFERASE (AST) (SGOT): CPT | Mod: HCNC

## 2020-05-11 PROCEDURE — 85610 PROTHROMBIN TIME: CPT | Mod: HCNC

## 2020-05-11 PROCEDURE — 95115 IMMUNOTHERAPY ONE INJECTION: CPT | Mod: HCNC,S$GLB,, | Performed by: FAMILY MEDICINE

## 2020-05-11 PROCEDURE — 93793 ANTICOAG MGMT PT WARFARIN: CPT | Mod: S$GLB,,,

## 2020-05-11 PROCEDURE — 80061 LIPID PANEL: CPT | Mod: HCNC

## 2020-05-11 PROCEDURE — 36415 COLL VENOUS BLD VENIPUNCTURE: CPT | Mod: HCNC,PO

## 2020-05-11 PROCEDURE — 93793 PR ANTICOAGULANT MGMT FOR PT TAKING WARFARIN: ICD-10-PCS | Mod: S$GLB,,,

## 2020-05-11 PROCEDURE — 84460 ALANINE AMINO (ALT) (SGPT): CPT | Mod: HCNC

## 2020-05-11 PROCEDURE — 85027 COMPLETE CBC AUTOMATED: CPT | Mod: HCNC

## 2020-05-11 NOTE — PROGRESS NOTES
INR lab draw was due 5/19, appointment was cancelled and rescheduled to today 5/11 by another department (coumadin clinic not notified)

## 2020-05-11 NOTE — PROGRESS NOTES
Spoke with pt, notified of results and verbalized understanding. Pt states she would not be able to go on 6/8/2020. Pt is scheduled for 6/22, advised the pt to call if she is able to make an earlier appt.

## 2020-05-22 ENCOUNTER — CLINICAL SUPPORT (OUTPATIENT)
Dept: INTERNAL MEDICINE | Facility: CLINIC | Age: 85
End: 2020-05-22
Payer: MEDICARE

## 2020-05-22 DIAGNOSIS — Z51.6 ALLERGY DESENSITIZATION THERAPY: ICD-10-CM

## 2020-05-22 PROCEDURE — 95115 IMMUNOTHERAPY ONE INJECTION: CPT | Mod: HCNC,S$GLB,, | Performed by: FAMILY MEDICINE

## 2020-05-22 PROCEDURE — 99499 UNLISTED E&M SERVICE: CPT | Mod: HCNC,S$GLB,, | Performed by: ALLERGY & IMMUNOLOGY

## 2020-05-22 PROCEDURE — 99499 NO LOS: ICD-10-PCS | Mod: HCNC,S$GLB,, | Performed by: ALLERGY & IMMUNOLOGY

## 2020-05-22 PROCEDURE — 95115 PR IMMUNOTHERAPY, ONE INJECTION: ICD-10-PCS | Mod: HCNC,S$GLB,, | Performed by: FAMILY MEDICINE

## 2020-06-04 ENCOUNTER — HOSPITAL ENCOUNTER (OUTPATIENT)
Dept: RADIOLOGY | Facility: HOSPITAL | Age: 85
Discharge: HOME | End: 2020-06-04
Attending: INTERNAL MEDICINE
Payer: MEDICARE

## 2020-06-04 ENCOUNTER — OFFICE VISIT (OUTPATIENT)
Dept: INTERNAL MEDICINE | Facility: CLINIC | Age: 85
End: 2020-06-04
Payer: MEDICARE

## 2020-06-04 VITALS
OXYGEN SATURATION: 99 % | WEIGHT: 183.44 LBS | SYSTOLIC BLOOD PRESSURE: 104 MMHG | BODY MASS INDEX: 29.48 KG/M2 | DIASTOLIC BLOOD PRESSURE: 64 MMHG | HEIGHT: 66 IN | HEART RATE: 80 BPM | TEMPERATURE: 98 F | RESPIRATION RATE: 12 BRPM

## 2020-06-04 DIAGNOSIS — S69.91XA INJURY OF RIGHT WRIST, INITIAL ENCOUNTER: Primary | ICD-10-CM

## 2020-06-04 DIAGNOSIS — S80.812A ABRASION OF LEFT LOWER EXTREMITY, INITIAL ENCOUNTER: ICD-10-CM

## 2020-06-04 DIAGNOSIS — S69.91XA INJURY OF RIGHT WRIST, INITIAL ENCOUNTER: ICD-10-CM

## 2020-06-04 PROCEDURE — 99999 PR PBB SHADOW E&M-EST. PATIENT-LVL III: CPT | Mod: PBBFAC,HCNC,, | Performed by: INTERNAL MEDICINE

## 2020-06-04 PROCEDURE — 1101F PT FALLS ASSESS-DOCD LE1/YR: CPT | Mod: HCNC,CPTII,S$GLB, | Performed by: INTERNAL MEDICINE

## 2020-06-04 PROCEDURE — 73110 X-RAY EXAM OF WRIST: CPT | Mod: TC,HCNC,PO,RT

## 2020-06-04 PROCEDURE — 1125F PR PAIN SEVERITY QUANTIFIED, PAIN PRESENT: ICD-10-PCS | Mod: HCNC,S$GLB,, | Performed by: INTERNAL MEDICINE

## 2020-06-04 PROCEDURE — 73090 XR FOREARM BILATERAL: ICD-10-PCS | Mod: 26,50,HCNC, | Performed by: RADIOLOGY

## 2020-06-04 PROCEDURE — 99999 PR PBB SHADOW E&M-EST. PATIENT-LVL III: ICD-10-PCS | Mod: PBBFAC,HCNC,, | Performed by: INTERNAL MEDICINE

## 2020-06-04 PROCEDURE — 73090 X-RAY EXAM OF FOREARM: CPT | Mod: TC,50,HCNC,PO

## 2020-06-04 PROCEDURE — 1125F AMNT PAIN NOTED PAIN PRSNT: CPT | Mod: HCNC,S$GLB,, | Performed by: INTERNAL MEDICINE

## 2020-06-04 PROCEDURE — 99213 PR OFFICE/OUTPT VISIT, EST, LEVL III, 20-29 MIN: ICD-10-PCS | Mod: HCNC,S$GLB,, | Performed by: INTERNAL MEDICINE

## 2020-06-04 PROCEDURE — 1101F PR PT FALLS ASSESS DOC 0-1 FALLS W/OUT INJ PAST YR: ICD-10-PCS | Mod: HCNC,CPTII,S$GLB, | Performed by: INTERNAL MEDICINE

## 2020-06-04 PROCEDURE — 73090 X-RAY EXAM OF FOREARM: CPT | Mod: 26,50,HCNC, | Performed by: RADIOLOGY

## 2020-06-04 PROCEDURE — 73110 X-RAY EXAM OF WRIST: CPT | Mod: 26,HCNC,RT, | Performed by: RADIOLOGY

## 2020-06-04 PROCEDURE — 1159F MED LIST DOCD IN RCRD: CPT | Mod: HCNC,S$GLB,, | Performed by: INTERNAL MEDICINE

## 2020-06-04 PROCEDURE — 1159F PR MEDICATION LIST DOCUMENTED IN MEDICAL RECORD: ICD-10-PCS | Mod: HCNC,S$GLB,, | Performed by: INTERNAL MEDICINE

## 2020-06-04 PROCEDURE — 99213 OFFICE O/P EST LOW 20 MIN: CPT | Mod: HCNC,S$GLB,, | Performed by: INTERNAL MEDICINE

## 2020-06-04 PROCEDURE — 73110 XR WRIST COMPLETE 3 VIEWS RIGHT: ICD-10-PCS | Mod: 26,HCNC,RT, | Performed by: RADIOLOGY

## 2020-06-04 RX ORDER — DOXYCYCLINE 100 MG/1
100 CAPSULE ORAL EVERY 12 HOURS
Qty: 20 CAPSULE | Refills: 0 | Status: SHIPPED | OUTPATIENT
Start: 2020-06-04 | End: 2020-06-16 | Stop reason: ALTCHOICE

## 2020-06-04 NOTE — PROGRESS NOTES
Subjective:       Patient ID: Jenniffer Jimenez is a 88 y.o. female.    Chief Complaint: Fall (Fell and hit bed wooden frame, injured left leg, scrape--area reddened) and Wrist Pain (Right wrist; unable to have weight bearing)      HPI   The patient fell at home 6 days ago hurting her right wrist and forearm.  She also sustained an abrasion to her left leg.    The patient is followed by dermatology for venous stasis dermatitis of the lower extremities.    The patient is physically active and exercises by riding a bike daily.  She had not sustained any other injuries to her wrist prior to this incident.    Review of Systems   Constitutional: Negative for activity change and fever.   Musculoskeletal: Positive for arthralgias and joint swelling.   Neurological: Negative for weakness and numbness.       Objective:      Physical Exam   Constitutional: She is oriented to person, place, and time. She appears well-developed and well-nourished. No distress.   HENT:   Head: Normocephalic and atraumatic.   Cardiovascular:   Trace left leg edema is present.  1+ right lower extremity edema is present.   Musculoskeletal:   Right distal forearm is tender on supination and pronation.  There is localized right wrist swelling present.     Neurological: She is alert and oriented to person, place, and time. She exhibits normal muscle tone.   Skin:   Large skin abrasion on the anterior aspect of the right leg.   Nursing note and vitals reviewed.      Assessment:       1. Injury of right wrist, initial encounter    2. Abrasion of left lower extremity, initial encounter        Plan:     Jenniffer was seen today for fall and wrist pain.  X-rays of the right wrist and forearm will be obtained.  Doxycycline will be sent for treatment of localized skin inflammation.  The patient was encouraged to use a wrist splint until her symptoms have resolved.    Diagnoses and all orders for this visit:    Injury of right wrist, initial encounter  -      Cancel: X-Ray Wrist 2 View Right; Future  -     X-Ray Radius Ulna Bilateral AP And Lateral; Future    Abrasion of left lower extremity, initial encounter    Other orders  -     doxycycline (MONODOX) 100 MG capsule; Take 1 capsule (100 mg total) by mouth every 12 (twelve) hours.

## 2020-06-04 NOTE — PROGRESS NOTES
6/4/20 pt was given message and states that she is not leaving her home, there is bad weather coming. Pt does see any reason to come back she only missed one day, then double the dose, she could have not called the clinic. Pt states that she has been doing fine on her regular dose she just made a mistake.

## 2020-06-04 NOTE — PROGRESS NOTES
Hold dose today and repeat INR ASAP.     Update s/w pt: she does not want to come in sooner for assessment of INR due to dose error. She agreed to come in 6/16 with other appts.

## 2020-06-04 NOTE — PROGRESS NOTES
Subjective:   Patient ID: Jenniffer Jimenez is a 86 y.o. female.    Chief Complaint: Mass (left side underarm/ rib area)      HPI  85 yo female with mass on left side and hasn't noticed it before.  Patient queried and denies any further complaints.      ALLERGIES AND MEDICATIONS: updated and reviewed.  Review of patient's allergies indicates:   Allergen Reactions    Bactrim [sulfamethoxazole-trimethoprim]      arthritis    Dramamine [dimenhydrinate]     Tramadol Other (See Comments)     hallucinations    Beta-blockers (beta-adrenergic blocking agts)      Can not go on beta blockers for long period of time    Phenergan [promethazine]      per pt. request- saw sisters have bad reaction to drug    Tizanidine Other (See Comments)       Current Outpatient Prescriptions:     aspirin (ECOTRIN) 81 MG EC tablet, Take 81 mg by mouth once daily., Disp: , Rfl:     fluticasone (FLONASE) 50 mcg/actuation nasal spray, 1 spray by Each Nare route once daily., Disp: 3 Bottle, Rfl: 0    furosemide (LASIX) 20 MG tablet, TAKE 1 TABLET(20 MG) BY MOUTH EVERY DAY, Disp: 90 tablet, Rfl: 3    peg 400-propylene glycol (SYSTANE) 0.4-0.3 % Drop, Apply to eye daily as needed. , Disp: , Rfl:     potassium chloride (MICRO-K) 10 MEQ CpSR, TAKE 1 CAPSULE(10 MEQ) BY MOUTH EVERY DAY, Disp: 90 capsule, Rfl: 3    pravastatin (PRAVACHOL) 40 MG tablet, TAKE 1 TABLET TWICE DAILY (Patient taking differently: Pt taking one pill once a day.), Disp: 180 tablet, Rfl: 3    triamcinolone (NASACORT) 55 mcg nasal inhaler, 2 sprays by Nasal route once daily., Disp: 17 g, Rfl: 0    warfarin (COUMADIN) 3 MG tablet, Take 3.5 mg by mouth once daily. Takes 4.5 mg everyday except Wednesday, takes 3mg., Disp: , Rfl:     Current Facility-Administered Medications:     Allergy Mix, , Subcutaneous, 1 time in Clinic/HOD, Harriet Franco MD    Review of Systems   Constitutional: Negative for activity change, appetite change, chills, diaphoresis, fatigue,  "fever and unexpected weight change.   HENT: Negative for congestion, ear discharge, ear pain, facial swelling, hearing loss, nosebleeds, postnasal drip, rhinorrhea, sinus pressure, sneezing, sore throat, tinnitus, trouble swallowing and voice change.    Eyes: Negative for photophobia, pain, discharge, redness, itching and visual disturbance.   Respiratory: Negative for cough, chest tightness, shortness of breath and wheezing.    Cardiovascular: Negative for chest pain, palpitations and leg swelling.   Gastrointestinal: Negative for abdominal distention, abdominal pain, anal bleeding, blood in stool, constipation, diarrhea, nausea, rectal pain and vomiting.   Endocrine: Negative for cold intolerance, heat intolerance, polydipsia, polyphagia and polyuria.   Genitourinary: Negative for difficulty urinating, dysuria and flank pain.   Musculoskeletal: Negative for arthralgias, back pain, joint swelling, myalgias and neck pain.   Skin: Negative for rash.   Neurological: Negative for dizziness, tremors, seizures, syncope, speech difficulty, weakness, light-headedness, numbness and headaches.   Psychiatric/Behavioral: Negative for behavioral problems, confusion, decreased concentration, dysphoric mood, sleep disturbance and suicidal ideas. The patient is not nervous/anxious and is not hyperactive.        Objective:     Vitals:    04/06/18 1006   BP: 134/72   Pulse: (!) 52   Resp: 16   Temp: 97.9 °F (36.6 °C)   TempSrc: Oral   Weight: 83.6 kg (184 lb 4.9 oz)   Height: 5' 6" (1.676 m)   PainSc:   3   PainLoc: Back     Body mass index is 29.75 kg/m².    Physical Exam   Constitutional: She is oriented to person, place, and time. She appears well-developed and well-nourished. She is cooperative. She does not have a sickly appearance. No distress.   HENT:   Head: Normocephalic and atraumatic.   Right Ear: Hearing, tympanic membrane, external ear and ear canal normal. No tenderness.   Left Ear: Hearing, tympanic membrane, external " ear and ear canal normal. No tenderness.   Nose: Nose normal.   Mouth/Throat: Oropharynx is clear and moist. Normal dentition. No oropharyngeal exudate, posterior oropharyngeal edema or posterior oropharyngeal erythema.   Eyes: Conjunctivae and lids are normal. Right eye exhibits no discharge. Left eye exhibits no discharge. Right conjunctiva is not injected. Left conjunctiva is not injected. No scleral icterus. Right eye exhibits normal extraocular motion. Left eye exhibits normal extraocular motion.   Neck: Normal range of motion. Neck supple. No JVD present. Carotid bruit is not present. No tracheal deviation and no edema present. No thyromegaly present.   Cardiovascular: Normal rate, regular rhythm, normal heart sounds and normal pulses.  Exam reveals no friction rub.    No murmur heard.  Pulmonary/Chest: Effort normal and breath sounds normal. No accessory muscle usage. No respiratory distress. She has no wheezes. She has no rhonchi. She has no rales.   Musculoskeletal: She exhibits no edema.   Lymphadenopathy:        Head (right side): No submandibular adenopathy present.        Head (left side): No submandibular adenopathy present.     She has no cervical adenopathy.   Neurological: She is alert and oriented to person, place, and time.   Skin: Skin is warm and dry. She is not diaphoretic.        Psychiatric: Her speech is normal and behavior is normal. Thought content normal. Her mood appears not anxious. Her affect is not angry, not labile and not inappropriate. She does not exhibit a depressed mood.       Assessment and Plan:   Jenniffer was seen today for mass.    Diagnoses and all orders for this visit:    Lipoma of torso      Observe    No Follow-up on file.    THIS NOTE WILL BE SHARED WITH THE PATIENT.         remains on pressors, not tolerating dialysis  no urgent need to re-attempt   prognosis grave -- rec hospice

## 2020-06-04 NOTE — PROGRESS NOTES
Patient called 06/04/20 to inform us that on 06/3/20 she mistakenly took (Warfarin 4.5mg) twice. She want to know what to do today? Please advise

## 2020-06-16 ENCOUNTER — TELEPHONE (OUTPATIENT)
Dept: CARDIOLOGY | Facility: CLINIC | Age: 85
End: 2020-06-16

## 2020-06-16 ENCOUNTER — LAB VISIT (OUTPATIENT)
Dept: LAB | Facility: HOSPITAL | Age: 85
End: 2020-06-16
Attending: INTERNAL MEDICINE
Payer: MEDICARE

## 2020-06-16 ENCOUNTER — OFFICE VISIT (OUTPATIENT)
Dept: CARDIOLOGY | Facility: CLINIC | Age: 85
End: 2020-06-16
Payer: MEDICARE

## 2020-06-16 ENCOUNTER — ANTI-COAG VISIT (OUTPATIENT)
Dept: CARDIOLOGY | Facility: CLINIC | Age: 85
End: 2020-06-16
Payer: MEDICARE

## 2020-06-16 VITALS
SYSTOLIC BLOOD PRESSURE: 130 MMHG | BODY MASS INDEX: 28.21 KG/M2 | HEART RATE: 72 BPM | WEIGHT: 175.5 LBS | DIASTOLIC BLOOD PRESSURE: 66 MMHG | HEIGHT: 66 IN

## 2020-06-16 DIAGNOSIS — Q20.8 ABNORMALITY OF LEFT ATRIAL APPENDAGE: Primary | ICD-10-CM

## 2020-06-16 DIAGNOSIS — Z79.01 CURRENT USE OF LONG TERM ANTICOAGULATION: ICD-10-CM

## 2020-06-16 DIAGNOSIS — Z79.01 LONG TERM (CURRENT) USE OF ANTICOAGULANTS: ICD-10-CM

## 2020-06-16 DIAGNOSIS — N18.30 CHRONIC KIDNEY DISEASE, STAGE III (MODERATE): ICD-10-CM

## 2020-06-16 DIAGNOSIS — I48.20 CHRONIC ATRIAL FIBRILLATION: ICD-10-CM

## 2020-06-16 DIAGNOSIS — E78.00 PURE HYPERCHOLESTEROLEMIA: ICD-10-CM

## 2020-06-16 DIAGNOSIS — I50.32 CHRONIC DIASTOLIC HEART FAILURE: ICD-10-CM

## 2020-06-16 DIAGNOSIS — E66.3 OVERWEIGHT (BMI 25.0-29.9): ICD-10-CM

## 2020-06-16 DIAGNOSIS — R13.10 DYSPHAGIA, UNSPECIFIED TYPE: ICD-10-CM

## 2020-06-16 DIAGNOSIS — I48.20 CHRONIC ATRIAL FIBRILLATION: Primary | ICD-10-CM

## 2020-06-16 LAB
INR PPP: 1.8 (ref 0.8–1.2)
PROTHROMBIN TIME: 17.5 SEC (ref 9–12.5)

## 2020-06-16 PROCEDURE — 99499 UNLISTED E&M SERVICE: CPT | Mod: HCNC,S$GLB,, | Performed by: NURSE PRACTITIONER

## 2020-06-16 PROCEDURE — 93010 EKG 12-LEAD: ICD-10-PCS | Mod: HCNC,S$GLB,, | Performed by: INTERNAL MEDICINE

## 2020-06-16 PROCEDURE — 1159F MED LIST DOCD IN RCRD: CPT | Mod: HCNC,S$GLB,, | Performed by: NURSE PRACTITIONER

## 2020-06-16 PROCEDURE — 1126F AMNT PAIN NOTED NONE PRSNT: CPT | Mod: HCNC,S$GLB,, | Performed by: NURSE PRACTITIONER

## 2020-06-16 PROCEDURE — 1159F PR MEDICATION LIST DOCUMENTED IN MEDICAL RECORD: ICD-10-PCS | Mod: HCNC,S$GLB,, | Performed by: NURSE PRACTITIONER

## 2020-06-16 PROCEDURE — 93005 EKG 12-LEAD: ICD-10-PCS | Mod: HCNC,S$GLB,, | Performed by: INTERNAL MEDICINE

## 2020-06-16 PROCEDURE — 99999 PR PBB SHADOW E&M-EST. PATIENT-LVL IV: ICD-10-PCS | Mod: PBBFAC,HCNC,, | Performed by: NURSE PRACTITIONER

## 2020-06-16 PROCEDURE — 1101F PT FALLS ASSESS-DOCD LE1/YR: CPT | Mod: HCNC,CPTII,S$GLB, | Performed by: NURSE PRACTITIONER

## 2020-06-16 PROCEDURE — 99999 PR PBB SHADOW E&M-EST. PATIENT-LVL IV: CPT | Mod: PBBFAC,HCNC,, | Performed by: NURSE PRACTITIONER

## 2020-06-16 PROCEDURE — 1126F PR PAIN SEVERITY QUANTIFIED, NO PAIN PRESENT: ICD-10-PCS | Mod: HCNC,S$GLB,, | Performed by: NURSE PRACTITIONER

## 2020-06-16 PROCEDURE — 99214 OFFICE O/P EST MOD 30 MIN: CPT | Mod: HCNC,S$GLB,, | Performed by: NURSE PRACTITIONER

## 2020-06-16 PROCEDURE — 93005 ELECTROCARDIOGRAM TRACING: CPT | Mod: HCNC,S$GLB,, | Performed by: INTERNAL MEDICINE

## 2020-06-16 PROCEDURE — 99214 PR OFFICE/OUTPT VISIT, EST, LEVL IV, 30-39 MIN: ICD-10-PCS | Mod: HCNC,S$GLB,, | Performed by: NURSE PRACTITIONER

## 2020-06-16 PROCEDURE — 93010 ELECTROCARDIOGRAM REPORT: CPT | Mod: HCNC,S$GLB,, | Performed by: INTERNAL MEDICINE

## 2020-06-16 PROCEDURE — 85610 PROTHROMBIN TIME: CPT | Mod: HCNC

## 2020-06-16 PROCEDURE — 99499 RISK ADDL DX/OHS AUDIT: ICD-10-PCS | Mod: HCNC,S$GLB,, | Performed by: NURSE PRACTITIONER

## 2020-06-16 PROCEDURE — 36415 COLL VENOUS BLD VENIPUNCTURE: CPT | Mod: HCNC,PO

## 2020-06-16 PROCEDURE — 1101F PR PT FALLS ASSESS DOC 0-1 FALLS W/OUT INJ PAST YR: ICD-10-PCS | Mod: HCNC,CPTII,S$GLB, | Performed by: NURSE PRACTITIONER

## 2020-06-16 RX ORDER — MULTIVITAMIN
1 TABLET ORAL DAILY
COMMUNITY
End: 2022-07-25

## 2020-06-16 NOTE — TELEPHONE ENCOUNTER
Patient referred for watchman device by Mary Smith. I called the patient to schedule an appointment. I offered her to see Dr. Curtis on 6/23/2020. Patient stated she would call me back next week or the following week to set up an appointment.

## 2020-06-16 NOTE — Clinical Note
Dr. Young,  She wanted a note to keep her from having to go to the senior center.  I don't have a reason from a cardiac perspective to prevent her from going to the senior center.  I asked her to d/w you.  Thanks, Mary

## 2020-06-16 NOTE — PATIENT INSTRUCTIONS
Your white blood cell count is a little low.  Discuss with Dr. Hernandez if you need to re-check this.    Your alkaline phosphatase is a little high.  Ask if Dr. Hernandez thinks you should check your vitamin D level.

## 2020-06-16 NOTE — PROGRESS NOTES
Ms. Jimenez is a patient of Dr. Saenz and was last seen in [facility] Visit 11/22/19.      Subjective:   Patient ID:  Jenniffer Jimenez is a 88 y.o. female who presents for follow-up of Atrial Fibrillation and chronic diastolic heart failure    Problem List:  Atrial fib - chronic 1/16  HTN  Hypercholesterolemia  TIA 1997 and then transient vision loss ~2005  CHF diastolic     HPI:   Jenniffer Jimenez is in clinic today for routine follow up.  Reports some balance since the pandemic started.  She has been increasing her walking an is up to 6 blocks a day.  Reports chest tightness along her bra line that is constant for a day and then goes away.  It is not associated with exertion.  Her weight is down 8 pounds in the last 2 weeks.  She said the wt loss was a result of being on abx.  Patient denies chest pain with exertion or at rest, palpitations, SOB, MENDIETA, dizziness, syncope, edema, orthopnea, PND, or claudication.  Patient is taking warfarin for thromboembolic prophylaxis.  Denies bleeding gums, epistaxis, hemoptysis, hematuria, and hematochezia.  Her blood pressures run 110-130s.     ECG today: atrial fibrillation with PVCs, rate 76 bpm    Review of Systems   Constitution: Negative for decreased appetite, diaphoresis, malaise/fatigue, weight gain and weight loss.   Eyes: Negative for visual disturbance.   Cardiovascular: Negative for chest pain, claudication, dyspnea on exertion, irregular heartbeat, leg swelling, near-syncope, orthopnea, palpitations, paroxysmal nocturnal dyspnea and syncope.        Denies chest pressure   Respiratory: Positive for cough, sputum production (clear) and wheezing. Negative for hemoptysis, shortness of breath, sleep disturbances due to breathing and snoring.    Endocrine: Negative for cold intolerance and heat intolerance.   Hematologic/Lymphatic: Negative for bleeding problem. Does not bruise/bleed easily.   Musculoskeletal: Positive for arthritis, muscle cramps and neck  "pain. Negative for myalgias.   Gastrointestinal: Positive for constipation, diarrhea, dysphagia and heartburn. Negative for bloating, abdominal pain, anorexia, change in bowel habit, nausea and vomiting.   Genitourinary: Positive for frequency.   Neurological: Positive for excessive daytime sleepiness and numbness. Negative for difficulty with concentration, disturbances in coordination, dizziness, headaches, light-headedness, loss of balance and weakness.   Psychiatric/Behavioral: The patient does not have insomnia.        Allergies and current medications updated and reviewed:  Review of patient's allergies indicates:   Allergen Reactions    Bactrim [sulfamethoxazole-trimethoprim] Other (See Comments)     "Couldn't walk"    Opioids - morphine analogues Other (See Comments)     Bowel issues; bowel obstruction    Tizanidine Other (See Comments)     "Lips were numb,  Almost passed out."    Tramadol Hallucinations    Beta-blockers (beta-adrenergic blocking agts) Other (See Comments)     Can not go on beta blockers for long period of time - due to taking allergy injections    Phenergan [promethazine] Other (See Comments)     Per pt. request- saw sisters have bad reaction to drug     Current Outpatient Medications   Medication Sig    cholecalciferol, vitamin D3, (VITAMIN D3 ORAL) Take 1 tablet by mouth once daily.    diclofenac sodium (VOLTAREN) 1 % Gel Apply 2 g topically 3 (three) times daily. Apply to ankle for pain    fluticasone propionate (FLONASE) 50 mcg/actuation nasal spray USE 1 SPRAY IN EACH NOSTRIL ONE TIME DAILY    furosemide (LASIX) 20 MG tablet TAKE 1 TABLET BY MOUTH DAILY; MAY TAKE AN ADDITIONAL TABLET AS NEEDED FOR SWELLING.    multivitamin (ONE DAILY MULTIVITAMIN) per tablet Take 1 tablet by mouth once daily.    mupirocin (BACTROBAN) 2 % ointment Apply to affected area 3 times daily    peg 400-propylene glycol (SYSTANE) 0.4-0.3 % Drop Place 1-2 drops into both eyes as needed.     " "potassium chloride (MICRO-K) 10 MEQ CpSR TAKE 1 CAPSULE EVERY DAY    pravastatin (PRAVACHOL) 40 MG tablet Take 1 tablet (40 mg total) by mouth 2 (two) times daily. (Patient taking differently: Take 40 mg by mouth once daily. )    triamcinolone (NASACORT) 55 mcg nasal inhaler 2 sprays by Nasal route once daily.    warfarin (COUMADIN) 3 MG tablet 4.5mg PO daily - OR AS DIRECTED BY COU[facility]ADIN CLINIC     Current Facility-Administered Medications   Medication    Allergy Mix       Objective:        /66   Pulse 72   Ht 5' 6" (1.676 m)   Wt 79.6 kg (175 lb 7.8 oz)   LMP  (LMP Unknown)   BMI 28.32 kg/m²     Physical Exam   Constitutional: She is oriented to person, place, and time. Vital signs are normal. She appears well-developed and well-nourished. She is active. No distress.   Examined in chair d/t mobility issues.   HENT:   Head: Normocephalic and atraumatic.   Eyes: Conjunctivae and lids are normal. No scleral icterus.   Neck: Neck supple. Normal carotid pulses, no hepatojugular reflux and no JVD present. Carotid bruit is not present.   Cardiovascular: Normal rate, regular rhythm, S1 normal, S2 normal and intact distal pulses. PMI is not displaced. Exam reveals no gallop and no friction rub.   No murmur heard.  Pulses:       Carotid pulses are 2+ on the right side and 2+ on the left side.       Radial pulses are 2+ on the right side and 2+ on the left side.        Dorsalis pedis pulses are 2+ on the right side and 2+ on the left side.        Posterior tibial pulses are 1+ on the right side and 1+ on the left side.   Pulmonary/Chest: Effort normal and breath sounds normal. No respiratory distress. She has no decreased breath sounds. She has no wheezes. She has no rhonchi. She has no rales. She exhibits no tenderness.   Abdominal: Soft. Normal appearance and bowel sounds are normal. She exhibits no distension, no fluid wave, no abdominal bruit, no ascites and no pulsatile midline mass. There is no " hepatosplenomegaly. There is no abdominal tenderness.   Musculoskeletal:         General: Edema (BLE trace to +1 pitting in ankles and 1/3 way up calves. ) present.   Neurological: She is alert and oriented to person, place, and time. Gait normal.   Skin: Skin is warm, dry and intact. No rash noted. She is not diaphoretic. Nails show no clubbing.   Psychiatric: She has a normal mood and affect. Her speech is normal and behavior is normal. Judgment and thought content normal. Cognition and memory are normal.   Nursing note and vitals reviewed.      Chemistry        Component Value Date/Time     05/11/2020 0748    K 4.6 05/11/2020 0748     05/11/2020 0748    CO2 27 05/11/2020 0748    BUN 17 05/11/2020 0748    CREATININE 0.9 05/11/2020 0748    GLU 91 05/11/2020 0748        Component Value Date/Time    CALCIUM 8.9 05/11/2020 0748    ALKPHOS 144 (H) 01/01/2020 2319    AST 18 05/11/2020 0748    ALT 12 05/11/2020 0748    BILITOT 0.7 01/01/2020 2319    ESTGFRAFRICA >60.0 05/11/2020 0748    EGFRNONAA 57.3 (A) 05/11/2020 0748        Lab Results   Component Value Date    HGBA1C 6.0 (H) 10/03/2019     Recent Labs   Lab 01/24/18  0739  10/03/19  0917 01/01/20  2319 05/11/20  0748   WBC 4.75   < > 4.79 5.95 3.25 L   Hemoglobin 12.4   < > 12.5 11.6 L 11.6 L   Hematocrit 37.6   < > 38.8 34.7 L 36.8 L   Mean Corpuscular Volume 89   < > 93 90 95   Platelets 258   < > 232 208 196    H  --   --  178 H  --    TSH 3.998   < > 2.164  --   --    Cholesterol 224 H   < > 218 H  --  176   HDL 82 H   < > 82 H  --  58   LDL Cholesterol 117.8   < > 117.4  --  103.6   Triglycerides 121   < > 93  --  72   Hdl/Cholesterol Ratio 36.6   < > 37.6  --  33.0    < > = values in this interval not displayed.       Recent Labs   Lab 01/27/20  1421 02/28/20  1406 04/27/20  0758 05/11/20  0748   INR 2.3 2.8 2.7 H 2.1 H        Test(s) Reviewed  I have reviewed the following in detail:  [] Stress test   [] Angiography   [x] Echocardiogram    [x] Labs   [] Other:         Assessment/Plan:   1. Chronic atrial fibrillation    - EKG 12-lead  - Ambulatory referral/consult to Interventional Cardiology; Future  - Magnesium; Future    2. Current use of long term anticoagulation  Having balance issues and started to fall and caught her leg on her bed.  Wound is bandaged and has been seen by primary care.  Discussed watchman at length and shown the watchman device. Will refer to interventional cardiology for evaluation for watchman.  Denies bleeding.  Discussed when to seek immediate medical attention.     - CBC Without Differential; Future    3. Chronic diastolic heart failure  Weight is down. Denies SOB.  Some LE edema.     4. Pure hypercholesterolemia  .  Encouraged mediterranean diet and exercise.     - Comprehensive metabolic panel; Future  - Lipid Panel; Future    5. Chronic kidney disease, stage III (moderate)      6. Overweight (BMI 25.0-29.9)  BMI 28.3 Encouraged increased CV exercise to 30 minutes a day for 5 days a week.       7. Dysphagia, unspecified type  Reports some difficulty swallowing and occasionally choking on her food.  Refer to GI.     - Ambulatory referral/consult to Gastroenterology; Future      Patient was discussed with but not examined by Dr. Saenz    Follow up in about 6 months (around 12/16/2020).

## 2020-06-17 NOTE — PROGRESS NOTES
6/17/20 spoke with pt and she did she normal dose on yesterday because she could not be reach to give dose.

## 2020-06-22 ENCOUNTER — LAB VISIT (OUTPATIENT)
Dept: LAB | Facility: HOSPITAL | Age: 85
End: 2020-06-22
Attending: INTERNAL MEDICINE
Payer: MEDICARE

## 2020-06-22 ENCOUNTER — ANTI-COAG VISIT (OUTPATIENT)
Dept: CARDIOLOGY | Facility: CLINIC | Age: 85
End: 2020-06-22
Payer: MEDICARE

## 2020-06-22 ENCOUNTER — CLINICAL SUPPORT (OUTPATIENT)
Dept: INTERNAL MEDICINE | Facility: CLINIC | Age: 85
End: 2020-06-22
Payer: MEDICARE

## 2020-06-22 DIAGNOSIS — Z79.01 CURRENT USE OF LONG TERM ANTICOAGULATION: ICD-10-CM

## 2020-06-22 DIAGNOSIS — I48.20 CHRONIC ATRIAL FIBRILLATION: ICD-10-CM

## 2020-06-22 DIAGNOSIS — Z79.01 LONG TERM (CURRENT) USE OF ANTICOAGULANTS: ICD-10-CM

## 2020-06-22 DIAGNOSIS — J31.0 CHRONIC RHINITIS: ICD-10-CM

## 2020-06-22 LAB
INR PPP: 1.9 (ref 0.8–1.2)
PROTHROMBIN TIME: 18.4 SEC (ref 9–12.5)

## 2020-06-22 PROCEDURE — 95115 IMMUNOTHERAPY ONE INJECTION: CPT | Mod: HCNC,S$GLB,, | Performed by: FAMILY MEDICINE

## 2020-06-22 PROCEDURE — 93793 PR ANTICOAGULANT MGMT FOR PT TAKING WARFARIN: ICD-10-PCS | Mod: S$GLB,,,

## 2020-06-22 PROCEDURE — 95115 PR IMMUNOTHERAPY, ONE INJECTION: ICD-10-PCS | Mod: HCNC,S$GLB,, | Performed by: FAMILY MEDICINE

## 2020-06-22 PROCEDURE — 99499 UNLISTED E&M SERVICE: CPT | Mod: HCNC,S$GLB,, | Performed by: ALLERGY & IMMUNOLOGY

## 2020-06-22 PROCEDURE — 93793 ANTICOAG MGMT PT WARFARIN: CPT | Mod: S$GLB,,,

## 2020-06-22 PROCEDURE — 36415 COLL VENOUS BLD VENIPUNCTURE: CPT | Mod: HCNC,PO

## 2020-06-22 PROCEDURE — 99499 NO LOS: ICD-10-PCS | Mod: HCNC,S$GLB,, | Performed by: ALLERGY & IMMUNOLOGY

## 2020-06-22 PROCEDURE — 85610 PROTHROMBIN TIME: CPT | Mod: HCNC

## 2020-06-23 NOTE — PROGRESS NOTES
INR a tad below range at 1.9. She normally has cranberry juice daily and has not had any. No other significant changes. Current dose has been very stable in the past and only slightly off. Bolus dose again today then resume maintenance dose. Recheck INR in 2 weeks    Noted has assessment planned for Kim chamberlain/ Dr. Curtis in 2 weeks

## 2020-06-24 ENCOUNTER — TELEPHONE (OUTPATIENT)
Dept: OPHTHALMOLOGY | Facility: CLINIC | Age: 85
End: 2020-06-24

## 2020-06-24 NOTE — TELEPHONE ENCOUNTER
Appointment     Pt was given an appointment for 6/25, was instructed to call if symptom worsen prior to appointment     Pt verbalized she understands

## 2020-06-24 NOTE — TELEPHONE ENCOUNTER
----- Message from Beverly Inman sent at 6/24/2020  8:25 AM CDT -----  Contact: [place]  Pt stated she's having some flashing going on in her left eye and needed to be seen on today if possible. Pt stated she also having black spots. Pt stated this started a couple of days ago. Pt contact number is [phone].

## 2020-06-25 ENCOUNTER — HOSPITAL ENCOUNTER (OUTPATIENT)
Facility: HOSPITAL | Age: 85
Discharge: HOME | End: 2020-06-26
Attending: EMERGENCY MEDICINE | Admitting: PSYCHIATRY & NEUROLOGY
Payer: MEDICARE

## 2020-06-25 ENCOUNTER — OFFICE VISIT (OUTPATIENT)
Dept: OPTOMETRY | Facility: CLINIC | Age: 85
End: 2020-06-25
Payer: MEDICARE

## 2020-06-25 DIAGNOSIS — E66.3 OVERWEIGHT (BMI 25.0-29.9): ICD-10-CM

## 2020-06-25 DIAGNOSIS — I48.20 CHRONIC ATRIAL FIBRILLATION: ICD-10-CM

## 2020-06-25 DIAGNOSIS — H34.8392 BRVO (BRANCH RETINAL VEIN OCCLUSION): Primary | ICD-10-CM

## 2020-06-25 DIAGNOSIS — H34.8322 BRANCH RETINAL VEIN OCCLUSION OF LEFT EYE, UNSPECIFIED COMPLICATION STATUS: Primary | ICD-10-CM

## 2020-06-25 DIAGNOSIS — H43.813 PVD (POSTERIOR VITREOUS DETACHMENT), BILATERAL: ICD-10-CM

## 2020-06-25 DIAGNOSIS — I10 ESSENTIAL HYPERTENSION: Chronic | ICD-10-CM

## 2020-06-25 DIAGNOSIS — Z96.1 PSEUDOPHAKIA OF BOTH EYES: ICD-10-CM

## 2020-06-25 DIAGNOSIS — G45.9 TIA (TRANSIENT ISCHEMIC ATTACK): ICD-10-CM

## 2020-06-25 DIAGNOSIS — H81.09 MENIERE'S DISEASE, UNSPECIFIED LATERALITY: ICD-10-CM

## 2020-06-25 DIAGNOSIS — R73.03 PREDIABETES: ICD-10-CM

## 2020-06-25 DIAGNOSIS — H04.123 DRY EYE SYNDROME OF BOTH EYES: ICD-10-CM

## 2020-06-25 LAB
ALBUMIN SERPL BCP-MCNC: 4.2 G/DL (ref 3.5–5.2)
ALP SERPL-CCNC: 123 U/L (ref 55–135)
ALT SERPL W/O P-5'-P-CCNC: 15 U/L (ref 10–44)
ANION GAP SERPL CALC-SCNC: 10 MMOL/L (ref 8–16)
APTT BLDCRRT: 30.2 SEC (ref 21–32)
AST SERPL-CCNC: 21 U/L (ref 10–40)
BASOPHILS # BLD AUTO: 0.02 K/UL (ref 0–0.2)
BASOPHILS NFR BLD: 0.5 % (ref 0–1.9)
BILIRUB SERPL-MCNC: 0.8 MG/DL (ref 0.1–1)
BUN SERPL-MCNC: 13 MG/DL (ref 8–23)
CALCIUM SERPL-MCNC: 9.6 MG/DL (ref 8.7–10.5)
CHLORIDE SERPL-SCNC: 104 MMOL/L (ref 95–110)
CHOLEST SERPL-MCNC: 206 MG/DL (ref 120–199)
CHOLEST/HDLC SERPL: 2.8 {RATIO} (ref 2–5)
CO2 SERPL-SCNC: 27 MMOL/L (ref 23–29)
CREAT SERPL-MCNC: 0.9 MG/DL (ref 0.5–1.4)
CRP SERPL-MCNC: 1.6 MG/L (ref 0–8.2)
DIFFERENTIAL METHOD: ABNORMAL
EOSINOPHIL # BLD AUTO: 0 K/UL (ref 0–0.5)
EOSINOPHIL NFR BLD: 0.8 % (ref 0–8)
ERYTHROCYTE [DISTWIDTH] IN BLOOD BY AUTOMATED COUNT: 15 % (ref 11.5–14.5)
ERYTHROCYTE [SEDIMENTATION RATE] IN BLOOD BY WESTERGREN METHOD: 40 MM/HR (ref 0–36)
EST. GFR  (AFRICAN AMERICAN): >60 ML/MIN/1.73 M^2
EST. GFR  (NON AFRICAN AMERICAN): 57.3 ML/MIN/1.73 M^2
ESTIMATED AVG GLUCOSE: 114 MG/DL (ref 68–131)
GLUCOSE SERPL-MCNC: 93 MG/DL (ref 70–110)
HBA1C MFR BLD HPLC: 5.6 % (ref 4–5.6)
HCT VFR BLD AUTO: 36 % (ref 37–48.5)
HDLC SERPL-MCNC: 74 MG/DL (ref 40–75)
HDLC SERPL: 35.9 % (ref 20–50)
HGB BLD-MCNC: 11.9 G/DL (ref 12–16)
IMM GRANULOCYTES # BLD AUTO: 0.01 K/UL (ref 0–0.04)
IMM GRANULOCYTES NFR BLD AUTO: 0.3 % (ref 0–0.5)
INR PPP: 1.7 (ref 0.8–1.2)
LDLC SERPL CALC-MCNC: 114.2 MG/DL (ref 63–159)
LYMPHOCYTES # BLD AUTO: 1 K/UL (ref 1–4.8)
LYMPHOCYTES NFR BLD: 25.1 % (ref 18–48)
MCH RBC QN AUTO: 30.4 PG (ref 27–31)
MCHC RBC AUTO-ENTMCNC: 33.1 G/DL (ref 32–36)
MCV RBC AUTO: 92 FL (ref 82–98)
MONOCYTES # BLD AUTO: 0.3 K/UL (ref 0.3–1)
MONOCYTES NFR BLD: 8.2 % (ref 4–15)
NEUTROPHILS # BLD AUTO: 2.5 K/UL (ref 1.8–7.7)
NEUTROPHILS NFR BLD: 65.1 % (ref 38–73)
NONHDLC SERPL-MCNC: 132 MG/DL
NRBC BLD-RTO: 0 /100 WBC
PLATELET # BLD AUTO: 171 K/UL (ref 150–350)
PMV BLD AUTO: 9.8 FL (ref 9.2–12.9)
POTASSIUM SERPL-SCNC: 4.1 MMOL/L (ref 3.5–5.1)
PROT SERPL-MCNC: 7.3 G/DL (ref 6–8.4)
PROTHROMBIN TIME: 16.2 SEC (ref 9–12.5)
RBC # BLD AUTO: 3.91 M/UL (ref 4–5.4)
SARS-COV-2 RDRP RESP QL NAA+PROBE: NEGATIVE
SODIUM SERPL-SCNC: 141 MMOL/L (ref 136–145)
TRIGL SERPL-MCNC: 89 MG/DL (ref 30–150)
TSH SERPL DL<=0.005 MIU/L-ACNC: 2.83 UIU/ML (ref 0.4–4)
WBC # BLD AUTO: 3.79 K/UL (ref 3.9–12.7)

## 2020-06-25 PROCEDURE — U0002 COVID-19 LAB TEST NON-CDC: HCPCS | Mod: HCNC

## 2020-06-25 PROCEDURE — 83036 HEMOGLOBIN GLYCOSYLATED A1C: CPT | Mod: HCNC

## 2020-06-25 PROCEDURE — G0378 HOSPITAL OBSERVATION PER HR: HCPCS | Mod: HCNC

## 2020-06-25 PROCEDURE — 93005 ELECTROCARDIOGRAM TRACING: CPT | Mod: HCNC

## 2020-06-25 PROCEDURE — 99285 EMERGENCY DEPT VISIT HI MDM: CPT | Mod: HCNC,,, | Performed by: EMERGENCY MEDICINE

## 2020-06-25 PROCEDURE — 99220 PR INITIAL OBSERVATION CARE,LEVL III: CPT | Mod: HCNC,,, | Performed by: NURSE PRACTITIONER

## 2020-06-25 PROCEDURE — 99285 EMERGENCY DEPT VISIT HI MDM: CPT | Mod: 25,HCNC

## 2020-06-25 PROCEDURE — 92014 COMPRE OPH EXAM EST PT 1/>: CPT | Mod: HCNC,S$GLB,, | Performed by: OPTOMETRIST

## 2020-06-25 PROCEDURE — 99999 PR PBB SHADOW E&M-EST. PATIENT-LVL II: ICD-10-PCS | Mod: PBBFAC,HCNC,, | Performed by: OPTOMETRIST

## 2020-06-25 PROCEDURE — 85025 COMPLETE CBC W/AUTO DIFF WBC: CPT | Mod: HCNC

## 2020-06-25 PROCEDURE — 92014 PR EYE EXAM, EST PATIENT,COMPREHESV: ICD-10-PCS | Mod: HCNC,S$GLB,, | Performed by: OPTOMETRIST

## 2020-06-25 PROCEDURE — 25000003 PHARM REV CODE 250: Mod: HCNC | Performed by: NURSE PRACTITIONER

## 2020-06-25 PROCEDURE — 99220 PR INITIAL OBSERVATION CARE,LEVL III: ICD-10-PCS | Mod: HCNC,,, | Performed by: NURSE PRACTITIONER

## 2020-06-25 PROCEDURE — 80053 COMPREHEN METABOLIC PANEL: CPT | Mod: HCNC

## 2020-06-25 PROCEDURE — 93010 EKG 12-LEAD: ICD-10-PCS | Mod: HCNC,,, | Performed by: INTERNAL MEDICINE

## 2020-06-25 PROCEDURE — 36000 PLACE NEEDLE IN VEIN: CPT | Mod: HCNC

## 2020-06-25 PROCEDURE — 99285 PR EMERGENCY DEPT VISIT,LEVEL V: ICD-10-PCS | Mod: HCNC,,, | Performed by: EMERGENCY MEDICINE

## 2020-06-25 PROCEDURE — 93010 ELECTROCARDIOGRAM REPORT: CPT | Mod: HCNC,,, | Performed by: INTERNAL MEDICINE

## 2020-06-25 PROCEDURE — 84443 ASSAY THYROID STIM HORMONE: CPT | Mod: HCNC

## 2020-06-25 PROCEDURE — 85730 THROMBOPLASTIN TIME PARTIAL: CPT | Mod: HCNC

## 2020-06-25 PROCEDURE — 36415 COLL VENOUS BLD VENIPUNCTURE: CPT | Mod: HCNC

## 2020-06-25 PROCEDURE — 86140 C-REACTIVE PROTEIN: CPT | Mod: HCNC

## 2020-06-25 PROCEDURE — 99999 PR PBB SHADOW E&M-EST. PATIENT-LVL II: CPT | Mod: PBBFAC,HCNC,, | Performed by: OPTOMETRIST

## 2020-06-25 PROCEDURE — 80061 LIPID PANEL: CPT | Mod: HCNC

## 2020-06-25 PROCEDURE — 85652 RBC SED RATE AUTOMATED: CPT | Mod: HCNC

## 2020-06-25 PROCEDURE — 85610 PROTHROMBIN TIME: CPT | Mod: HCNC

## 2020-06-25 RX ORDER — ASPIRIN 81 MG/1
81 TABLET ORAL DAILY
Status: DISCONTINUED | OUTPATIENT
Start: 2020-06-26 | End: 2020-06-25

## 2020-06-25 RX ORDER — POTASSIUM CHLORIDE 750 MG/1
10 CAPSULE, EXTENDED RELEASE ORAL DAILY
Status: DISCONTINUED | OUTPATIENT
Start: 2020-06-26 | End: 2020-06-26 | Stop reason: HOSPADM

## 2020-06-25 RX ORDER — FUROSEMIDE 20 MG/1
20 TABLET ORAL DAILY
Status: DISCONTINUED | OUTPATIENT
Start: 2020-06-26 | End: 2020-06-26 | Stop reason: HOSPADM

## 2020-06-25 RX ORDER — ATORVASTATIN CALCIUM 10 MG/1
40 TABLET, FILM COATED ORAL DAILY
Status: DISCONTINUED | OUTPATIENT
Start: 2020-06-26 | End: 2020-06-26

## 2020-06-25 RX ORDER — PREDNISONE 20 MG/1
60 TABLET ORAL DAILY
Status: DISCONTINUED | OUTPATIENT
Start: 2020-06-26 | End: 2020-06-26

## 2020-06-25 RX ORDER — WARFARIN SODIUM 5 MG/1
5 TABLET ORAL DAILY
Status: DISCONTINUED | OUTPATIENT
Start: 2020-06-26 | End: 2020-06-26 | Stop reason: HOSPADM

## 2020-06-25 RX ORDER — FLUTICASONE PROPIONATE 50 MCG
2 SPRAY, SUSPENSION (ML) NASAL DAILY
Status: DISCONTINUED | OUTPATIENT
Start: 2020-06-26 | End: 2020-06-26 | Stop reason: HOSPADM

## 2020-06-25 RX ORDER — MUPIROCIN 20 MG/G
OINTMENT TOPICAL 3 TIMES DAILY
Status: DISCONTINUED | OUTPATIENT
Start: 2020-06-25 | End: 2020-06-26 | Stop reason: HOSPADM

## 2020-06-25 RX ORDER — SODIUM CHLORIDE 0.9 % (FLUSH) 0.9 %
10 SYRINGE (ML) INJECTION
Status: DISCONTINUED | OUTPATIENT
Start: 2020-06-25 | End: 2020-06-26 | Stop reason: HOSPADM

## 2020-06-25 RX ADMIN — MUPIROCIN: 20 OINTMENT TOPICAL at 10:06

## 2020-06-25 NOTE — PROVIDER PROGRESS NOTES - EMERGENCY DEPT.
" Emergency Department TeleTRIAGE Encounter Note      CHIEF COMPLAINT    Chief Complaint   Patient presents with    Eye Problem     sent from optho       VITAL SIGNS   Initial Vitals [06/25/20 1335]   BP Pulse Resp Temp SpO2   (!) 174/78 96 15 98 °F (36.7 °C) 97 %      MAP       --            ALLERGIES    Review of patient's allergies indicates:   Allergen Reactions    Bactrim [sulfamethoxazole-trimethoprim] Other (See Comments)     "Couldn't walk"    Opioids - morphine analogues Other (See Comments)     Bowel issues; bowel obstruction    Tizanidine Other (See Comments)     "Lips were numb,  Almost passed out."    Tramadol Hallucinations    Beta-blockers (beta-adrenergic blocking agts) Other (See Comments)     Can not go on beta blockers for long period of time - due to taking allergy injections    Phenergan [promethazine] Other (See Comments)     Per pt. request- saw sisters have bad reaction to drug       PROVIDER TRIAGE NOTE  Pt is an 88 yr old female with extensive medical history who presents to the ED after seeing ophtho today.  She has been having visual disturbance in the left eye, with headache, and jaw claudication.  They are concerned she may have GCA.  Will start with labs to evaluate inflammatory markers and will begin with head ct to rule out any intracranial abnormality.      ORDERS  Labs Reviewed   CBC W/ AUTO DIFFERENTIAL   COMPREHENSIVE METABOLIC PANEL   SEDIMENTATION RATE   C-REACTIVE PROTEIN       ED Orders (720h ago, onward)    Start Ordered     Status Ordering Provider    06/25/20 1403 06/25/20 1402  Comprehensive metabolic panel  STAT  Collect    Ordered SANAZ PERALTA    06/25/20 1403 06/25/20 1402  Sedimentation rate  STAT  Collect    Ordered SANAZ PERALTA    06/25/20 1403 06/25/20 1402  C-reactive protein  STAT  Collect    Ordered SANAZ PERALTA    06/25/20 1402 06/25/20 1402  Saline lock IV  Once      Ordered SANAZ PERALTA.    " 06/25/20 1402 06/25/20 1402  CBC auto differential  STAT  Collect    Ordered SANAZ PERALTA            Virtual Visit Note: The provider triage portion of this emergency department evaluation and documentation was performed via [employer], a HIPAA-compliant telemedicine application, in concert with a tele-presenter in the room. A face to face patient evaluation with one of my colleagues will occur once the patient is placed in an emergency department room.      DISCLAIMER: This note was prepared with [employer] voice recognition transcription software. Garbled syntax, mangled pronouns, and other bizarre constructions may be attributed to that software system.

## 2020-06-25 NOTE — ED PROVIDER NOTES
"Encounter Date: 6/25/2020       History     Chief Complaint   Patient presents with    Eye Problem     sent from optho     Pt is an 89 yo F w PMH Of atrial fibrillation on coumadin, TIA who presents with decreased vision to left eye referred to the ED by her optometrist today. Pt had 3 episodes of sudden acute complete loss of vision x 3 over the past week with return of vision in 20 minutes. She noted an area of decreased vision floaters that persisted and was evaluated for that today. She was found to have branch retinal vein occlusion that is acute. She also complained of a headache and jaw pain and was referred to the ER for further evaluation of possible GCA.  Pt tells me she is currently feeling well other than decreased visual acuity. She has had a TIA in the past but it did not affect her vision at that time. She denies facial droop, focal numbness or weakness, difficulty speaking.    The history is provided by the patient.     Review of patient's allergies indicates:   Allergen Reactions    Bactrim [sulfamethoxazole-trimethoprim] Other (See Comments)     "Couldn't walk"    Opioids - morphine analogues Other (See Comments)     Bowel issues; bowel obstruction    Tizanidine Other (See Comments)     "Lips were numb,  Almost passed out."    Tramadol Hallucinations    Beta-blockers (beta-adrenergic blocking agts) Other (See Comments)     Can not go on beta blockers for long period of time - due to taking allergy injections    Phenergan [promethazine] Other (See Comments)     Per pt. request- saw sisters have bad reaction to drug     Past Medical History:   Diagnosis Date    Amblyopia of left eye 4/10/2013    Anticoagulant long-term use     Coumadin - A-Fib    Anxiety     Arthritis     Facet arthropathy, Lumbosacral    Atherosclerosis of aorta     Atrial fibrillation     Auricular fibrillation     Central retinal vein occlusion of left eye     CHF (congestive heart failure)     Chronic kidney " disease, stage 3     Coronary artery disease     Depression     Diastolic heart failure 2014    Exotropia of both eyes 2013    recession RSR 5.0mm w/ adj; recession LR os 5.0 w/ adj; resect MR os  4.0mm    Hearing loss     History of resection of small bowel     Hyperlipidemia     Hypertension     Hypertensive retinopathy of both eyes     Hypoglycemia     Macular degeneration     Meniere's disease of both ears     OA (osteoarthritis) of shoulder     Right    Osteoporosis     Other and unspecified hyperlipidemia     Posterior vitreous detachment of both eyes     Rhinitis     TIA (transient ischemic attack)      Past Surgical History:   Procedure Laterality Date    APPENDECTOMY      CARDIAC CATHETERIZATION      CATARACT EXTRACTION W/  INTRAOCULAR LENS IMPLANT Bilateral      SECTION, CLASSIC      HYSTERECTOMY      INNER EAR SURGERY      JOINT REPLACEMENT      LEFT KNEE REPLACEMENT IN  -    OOPHORECTOMY      SINUS SURGERY      STRABISMUS SURGERY  13    RSR recession 5 mm, LLR recession 5 mm and LMR resection 4mm    STRABISMUS SURGERY  2014    recess LR OD 6mm    TONSILLECTOMY       Family History   Problem Relation Age of Onset    Hypertension Mother     Hypertension Father     Liver disease Sister     Diabetes Sister     Heart disease Sister     Diabetes Brother     Breast cancer Maternal Aunt     No Known Problems Maternal Uncle     No Known Problems Paternal Aunt     No Known Problems Paternal Uncle     No Known Problems Maternal Grandmother     No Known Problems Maternal Grandfather     No Known Problems Paternal Grandmother     No Known Problems Paternal Grandfather     No Known Problems Daughter     No Known Problems Son     Heart disease Sister     No Known Problems Son     No Known Problems Son     Cancer Neg Hx         in first degree relatives    Melanoma Neg Hx     Psoriasis Neg Hx     Lupus Neg Hx     Amblyopia Neg Hx      Blindness Neg Hx     Cataracts Neg Hx     Glaucoma Neg Hx     Macular degeneration Neg Hx     Retinal detachment Neg Hx     Strabismus Neg Hx     Stroke Neg Hx     Thyroid disease Neg Hx      Social History     Tobacco Use    Smoking status: Former Smoker     Types: Cigarettes     Quit date: 10/29/1982     Years since quittin.6    Smokeless tobacco: Never Used   Substance Use Topics    Alcohol use: Yes     Alcohol/week: 0.0 standard drinks     Comment: socially    Drug use: No     Review of Systems  General: No fever.  No chills.  Eyes: + visual changes.  Head:+ headache.    Integument: No rashes or lesions.  Chest: No shortness of breath.  Cardiovascular: No chest pain.  Abdomen: No abdominal pain.  No nausea or vomiting.  Urinary: No abnormal urination.  Neurologic: No focal weakness.  No numbness.  Hematologic: No easy bruising.  Endocrine: No excessive thirst or urination.    Physical Exam     Initial Vitals [20 1335]   BP Pulse Resp Temp SpO2   (!) 174/78 96 15 98 °F (36.7 °C) 97 %      MAP       --         Physical Exam    Nursing note and vitals reviewed.  Constitutional: She appears well-developed and well-nourished. She is not diaphoretic. No distress.   HENT:   Head: Normocephalic and atraumatic.   Temples non-tender bilaterally   Eyes: Conjunctivae and EOM are normal.   Dilated left pupil (had dilated exam just prior to arrival)   Neck: Normal range of motion. Neck supple.   Cardiovascular: Normal rate and regular rhythm.   Pulmonary/Chest: No respiratory distress.   Abdominal: Soft. There is no abdominal tenderness.   Musculoskeletal: Normal range of motion.   Neurological: She is alert and oriented to person, place, and time. She has normal strength. No sensory deficit. GCS score is 15. GCS eye subscore is 4. GCS verbal subscore is 5. GCS motor subscore is 6.   No drift   Skin: Skin is warm and dry.         ED Course   Procedures  Labs Reviewed   CBC W/ AUTO DIFFERENTIAL -  Abnormal; Notable for the following components:       Result Value    WBC 3.79 (*)     RBC 3.91 (*)     Hemoglobin 11.9 (*)     Hematocrit 36.0 (*)     RDW 15.0 (*)     All other components within normal limits   COMPREHENSIVE METABOLIC PANEL - Abnormal; Notable for the following components:    eGFR if non  57.3 (*)     All other components within normal limits   SEDIMENTATION RATE - Abnormal; Notable for the following components:    Sed Rate 40 (*)     All other components within normal limits   PROTIME-INR - Abnormal; Notable for the following components:    Prothrombin Time 16.2 (*)     INR 1.7 (*)     All other components within normal limits    Narrative:     ADD-ON APTT #[id number]; PT-INR #[id number] PER CARLOS REN MD 17:03  06/25/2020    C-REACTIVE PROTEIN   APTT   PROTIME-INR   SARS-COV-2 RNA AMPLIFICATION, QUAL   APTT    Narrative:     ADD-ON APTT #[id number]; PT-INR #[id number] PER CARLOS REN MD 17:03  06/25/2020         ECG Results          EKG 12-lead (Final result)  Result time 06/26/20 10:05:05    Final result by Interface, [facility] (06/26/20 10:05:05)                 Narrative:    Test Reason : G45.9,    Vent. Rate : 074 BPM     Atrial Rate : 375 BPM     P-R Int : 000 ms          QRS Dur : 070 ms      QT Int : 386 ms       P-R-T Axes : 000 079 022 degrees     QTc Int : 428 ms    Atrial fibrillation  Nonspecific T wave abnormality  Abnormal ECG  When compared with ECG of 16-JUN-2020 07:59,  Nonspecific T wave abnormality, worse in Inferior leads  Confirmed by Hao Cortes MD ([id number]) on 6/26/2020 10:04:52 AM    Referred By: [email or web address]   SELF           Confirmed By:Hao Cortes MD                            Imaging Results          US Transcran Doppler Intracran Artr Ltd (Final result)  Result time 06/26/20 08:14:04    Final result by Eugenia Coto MD (06/26/20 08:14:04)                 Impression:      Normal temporal arteries.    No obvious findings  to suggest Giant Cell Arteritis.    Electronically signed by resident: Omid Marshall  Date:    06/26/2020  Time:    08:06    Electronically signed by: Eugenia Coto MD  Date:    06/26/2020  Time:    08:14             Narrative:    EXAMINATION:  US TRANSCRAN DOPPLER INTRACRAN ARTR LTD    Bilateral temporal artery color duplex sonography    CLINICAL HISTORY:  87 y/o F referred due to: Concern for GCA;    TECHNIQUE:  Standard color duplex sonography technique was used to evaluate the bilateral temporal arteries.  Longitudinal and transverse planes were evaluated.    COMPARISON:  MRA brain without contrast 06/25/2020    FINDINGS:  Right temporal artery ultrasound shows normal signal in the walls of the vessel on both planes.  No evidence of halo sign.  Right temporal artery velocity 72 cm/sec.    Left temporal artery ultrasound there is normal signal in the walls of the vessels in both planes.  No evidence of halo sign.  Left temporal artery velocity 76 cm/sec.                               MRA Neck without contrast (Final result)  Result time 06/25/20 18:29:06    Final result by Austin Us MD (06/25/20 18:29:06)                 Impression:      1. No acute intracranial abnormality.  2. Generalized cerebral volume loss and chronic microvascular ischemic change.  3. No significant arterial abnormalities.  4. Bilateral mastoid effusions.    Electronically signed by resident: Juan Jose Aranda  Date:    06/25/2020  Time:    18:05    Electronically signed by: Austin Us MD  Date:    06/25/2020  Time:    18:29             Narrative:    EXAMINATION:  MRI BRAIN WITHOUT CONTRAST; MRA NECK WITHOUT CONTRAST; MRA BRAIN WITHOUT CONTRAST    CLINICAL HISTORY:  TIA, initial exam;; Neuro deficit, acute, stroke suspected;; Stroke, follow up;    TECHNIQUE:  Routine MRI brain without contrast, noncontrast MRA of the brain, and noncontrast MRA of the neck. Multiplanar multisequence MR imaging of the brain was performed without contrast.  By separate acquisition, noncontrast MR angiography was performed of the intracranial vasculature with 3D time-of-flight technique through the [place] [facility] [place], with MIP reformatting. Non-contrast 2D and/or 3D time-of-flight MR angiography of the neck was performed, with MIP reformatting.    COMPARISON:  CT 06/25/2020 and MRI 12/13/2018.    FINDINGS:  Intracranial Compartment:    The ventricles are stable in size without evidence of hydrocephalus. No extra-axial blood or fluid collections.    There is generalized cerebral volume loss.  Patchy T2/FLAIR hyperintensities throughout the supratentorial white matter consistent with chronic microvascular ischemic change.  No mass lesion, acute hemorrhage, edema, or acute infarct.    Aorta: Left-sided arch with 2 branch vessels.    Extracranial carotid circulation: No hemodynamically significant stenosis, aneurysmal dilatation, or dissection.    Extracranial vertebral circulation: No hemodynamically significant stenosis, aneurysmal dilatation, or dissection.  Medial deviation of the right internal carotid artery.    Intracranial Arteries:    No focal high-grade stenosis, occlusion, or aneurysm within the ACAs or MCAs.    No focal high-grade stenosis, occlusion, or aneurysm within the intracranial vertebral arteries, basilar artery, or PCAs.    Skull/Extracranial Contents (limited evaluation): Bone marrow signal intensity is normal.  The orbits and intraorbital contents demonstrate no acute abnormality.  There are bilateral mastoid effusions.                               MRA Brain without contrast (Final result)  Result time 06/25/20 18:29:06    Final result by Austin Us MD (06/25/20 18:29:06)                 Impression:      1. No acute intracranial abnormality.  2. Generalized cerebral volume loss and chronic microvascular ischemic change.  3. No significant arterial abnormalities.  4. Bilateral mastoid effusions.    Electronically signed by resident: Juan Jose  Manoj  Date:    06/25/2020  Time:    18:05    Electronically signed by: Austin Us MD  Date:    06/25/2020  Time:    18:29             Narrative:    EXAMINATION:  MRI BRAIN WITHOUT CONTRAST; MRA NECK WITHOUT CONTRAST; MRA BRAIN WITHOUT CONTRAST    CLINICAL HISTORY:  TIA, initial exam;; Neuro deficit, acute, stroke suspected;; Stroke, follow up;    TECHNIQUE:  Routine MRI brain without contrast, noncontrast MRA of the brain, and noncontrast MRA of the neck. Multiplanar multisequence MR imaging of the brain was performed without contrast. By separate acquisition, noncontrast MR angiography was performed of the intracranial vasculature with 3D time-of-flight technique through the [place] [facility] [place], with MIP reformatting. Non-contrast 2D and/or 3D time-of-flight MR angiography of the neck was performed, with MIP reformatting.    COMPARISON:  CT 06/25/2020 and MRI 12/13/2018.    FINDINGS:  Intracranial Compartment:    The ventricles are stable in size without evidence of hydrocephalus. No extra-axial blood or fluid collections.    There is generalized cerebral volume loss.  Patchy T2/FLAIR hyperintensities throughout the supratentorial white matter consistent with chronic microvascular ischemic change.  No mass lesion, acute hemorrhage, edema, or acute infarct.    Aorta: Left-sided arch with 2 branch vessels.    Extracranial carotid circulation: No hemodynamically significant stenosis, aneurysmal dilatation, or dissection.    Extracranial vertebral circulation: No hemodynamically significant stenosis, aneurysmal dilatation, or dissection.  Medial deviation of the right internal carotid artery.    Intracranial Arteries:    No focal high-grade stenosis, occlusion, or aneurysm within the ACAs or MCAs.    No focal high-grade stenosis, occlusion, or aneurysm within the intracranial vertebral arteries, basilar artery, or PCAs.    Skull/Extracranial Contents (limited evaluation): Bone marrow signal intensity is normal.  The  orbits and intraorbital contents demonstrate no acute abnormality.  There are bilateral mastoid effusions.                               MRI Brain Without Contrast (Final result)  Result time 06/25/20 18:29:06    Final result by Austin Us MD (06/25/20 18:29:06)                 Impression:      1. No acute intracranial abnormality.  2. Generalized cerebral volume loss and chronic microvascular ischemic change.  3. No significant arterial abnormalities.  4. Bilateral mastoid effusions.    Electronically signed by resident: Juan Jose Aranda  Date:    06/25/2020  Time:    18:05    Electronically signed by: Austin Us MD  Date:    06/25/2020  Time:    18:29             Narrative:    EXAMINATION:  MRI BRAIN WITHOUT CONTRAST; MRA NECK WITHOUT CONTRAST; MRA BRAIN WITHOUT CONTRAST    CLINICAL HISTORY:  TIA, initial exam;; Neuro deficit, acute, stroke suspected;; Stroke, follow up;    TECHNIQUE:  Routine MRI brain without contrast, noncontrast MRA of the brain, and noncontrast MRA of the neck. Multiplanar multisequence MR imaging of the brain was performed without contrast. By separate acquisition, noncontrast MR angiography was performed of the intracranial vasculature with 3D time-of-flight technique through the [place] [facility] [place], with MIP reformatting. Non-contrast 2D and/or 3D time-of-flight MR angiography of the neck was performed, with MIP reformatting.    COMPARISON:  CT 06/25/2020 and MRI 12/13/2018.    FINDINGS:  Intracranial Compartment:    The ventricles are stable in size without evidence of hydrocephalus. No extra-axial blood or fluid collections.    There is generalized cerebral volume loss.  Patchy T2/FLAIR hyperintensities throughout the supratentorial white matter consistent with chronic microvascular ischemic change.  No mass lesion, acute hemorrhage, edema, or acute infarct.    Aorta: Left-sided arch with 2 branch vessels.    Extracranial carotid circulation: No hemodynamically significant stenosis,  aneurysmal dilatation, or dissection.    Extracranial vertebral circulation: No hemodynamically significant stenosis, aneurysmal dilatation, or dissection.  Medial deviation of the right internal carotid artery.    Intracranial Arteries:    No focal high-grade stenosis, occlusion, or aneurysm within the ACAs or MCAs.    No focal high-grade stenosis, occlusion, or aneurysm within the intracranial vertebral arteries, basilar artery, or PCAs.    Skull/Extracranial Contents (limited evaluation): Bone marrow signal intensity is normal.  The orbits and intraorbital contents demonstrate no acute abnormality.  There are bilateral mastoid effusions.                               CT Head Without Contrast (Final result)  Result time 06/25/20 15:49:33    Final result by Efrem Anne MD (06/25/20 15:49:33)                 Impression:      1. No acute intracranial abnormalities noting sequela of chronic microvascular ischemic change and senescent change.      Electronically signed by: Efrem Anne MD  Date:    06/25/2020  Time:    15:49             Narrative:    EXAMINATION:  CT HEAD WITHOUT CONTRAST    CLINICAL HISTORY:  Headache, acute, normal neuro exam;    TECHNIQUE:  Low dose axial images were obtained through the head.  Coronal and sagittal reformations were also performed. Contrast was not administered.    COMPARISON:  MRI 12/13/2018    FINDINGS:  There is generalized cerebral volume loss.  There is hypoattenuation in a periventricular fashion, likely sequela of chronic microvascular ischemic change.  There is no evidence of acute major vascular territory infarct, hemorrhage, or mass.  There is no hydrocephalus.  There are no abnormal extra-axial fluid collections.  The paranasal sinuses and mastoid air cells are clear, and there is no evidence of calvarial fracture.  The visualized soft tissues are unremarkable.  There is remote appearing left nasal bone fracture.                                 Medical Decision  Making:   History:   Old Medical Records: I decided to obtain old medical records.  Initial Assessment:   Pt with chronic afib, has been subtherapeutic on warfarin lately  Concern for TIA or stroke with amaurosis fugax three times this week  Also with headache and jaw pain, concern for GCA  ESR borderline at 40 with no elevation of CRP  Consulted vascular neurology- MRI head/neck and MRI brain ordered  PT/INR pending  Seen by optometrist today and no retinal detachment or signs of retinal artery occlusion  Differential Diagnosis:   TIA, stroke, GCA  Clinical Tests:   Lab Tests: Ordered  Radiological Study: Ordered  Medical Tests: Ordered  ED Management:  Care transferred to Dr. Beard pending labs including coags, EKG, MRI, MRA  dispo per vascular neurology                                 Clinical Impression:       ICD-10-CM ICD-9-CM   1. BRVO (branch retinal vein occlusion)  H34.8392 362.36   2. TIA (transient ischemic attack)  G45.9 435.9   3. Essential hypertension  I10 401.9   4. Chronic atrial fibrillation  I48.20 427.31   5. Meniere's disease, unspecified laterality  H81.09 386.00   6. Prediabetes  R73.03 790.29   7. Overweight (BMI 25.0-29.9)  E66.3 278.02             ED Disposition Condition    Observation                           Brittani Herrera MD  06/26/20 [id number]

## 2020-06-25 NOTE — ED TRIAGE NOTES
Pt began seeing spots and floaters approximately 1 week ago in her left eye. Pt has a history of venous occlusion in her left eye and was sent by Dr. Montelongo.

## 2020-06-25 NOTE — PROGRESS NOTES
HPI     Ms. Jenniffer Jimenez was referred by triage for a dark spot OS.    Patient complains she lost vision OS. It was black. Lasted a minute. Now   she is seeing swirling circles temporally OS. Light flashes within   circles. Seeing an orange square when she looks a certain way at the wall.   Vision hasn't had the black out vision since the first time.    *Pt notes that she has been really tired lately. (+)eye pain (+)pain   behind the eye (+)HA (+)amaurosis fujacks, 2 times with complete black out   vision. (-)weakness (-)changes in speech  After further questioning, patient reports a temporal headache and jaw   pain that started around the same time as the amaurosis fujacks. (-)scalp   tenderness.*    H/o CRVO OS         Pseudophakia OU         Exotropia OU         PVD OU         Hypermetropia OD    Would patient like a refraction today? no     Paitent denies diplopia, headaches, flashes/floaters, itching, tearing,   burning, redness, and pain.    (+)drops, systane    (-)Diabetes          OCULAR HISTORY  Last Eye Exam: 8/28/18 with Dr Duncan  (+)eye surgery   (+)diagnosed or treated for any eye conditions or diseases, see above    FAMILY HISTORY  (-)Glaucoma              Last edited by Jayashree Montelongo, OD on 6/25/2020  1:46 PM. (History)            Assessment /Plan     For exam results, see Encounter Report.    Branch retinal vein occlusion of left eye, unspecified complication status    Pseudophakia of both eyes    PVD (posterior vitreous detachment), bilateral    Dry eye syndrome of both eyes      1. Educated pt on findings. H/o BRVO OS. Today new BRVO with heme along superior edge of optic nerve. Small heme along I/T arcade. Refer to retina for further eval. Appointment scheduled today.     2. PCIOL clear and centered OU.     3. Stable, OU. Monitor yearly with DFE.     4. Educated pt on findings. Recommended ATs BID-TID + buffy QHS for added lubrication and comfort.  If symptoms worsen or dont improve, RTC.  Monitor.     NOTE: Patient reports temporal HA + jaw pain that began at the same as her amaurosis fujacks symptoms. She notes no scalp tenderness or weakness. Vision is stable.   Due to GCA symptoms, had patient transported to ED for GCA work-up. ED called and discussed findings.       RTC with Dr. Hernandez for further retina eval, me yearly or prn.

## 2020-06-26 ENCOUNTER — OUTPATIENT CASE MANAGEMENT (OUTPATIENT)
Dept: ADMINISTRATIVE | Facility: OTHER | Age: 85
End: 2020-06-26

## 2020-06-26 ENCOUNTER — ANTI-COAG VISIT (OUTPATIENT)
Dept: CARDIOLOGY | Facility: CLINIC | Age: 85
End: 2020-06-26
Payer: MEDICARE

## 2020-06-26 VITALS
WEIGHT: 157.88 LBS | TEMPERATURE: 98 F | RESPIRATION RATE: 18 BRPM | BODY MASS INDEX: 25.37 KG/M2 | OXYGEN SATURATION: 99 % | HEIGHT: 66 IN | HEART RATE: 78 BPM | SYSTOLIC BLOOD PRESSURE: 139 MMHG | DIASTOLIC BLOOD PRESSURE: 64 MMHG

## 2020-06-26 DIAGNOSIS — Z79.01 CURRENT USE OF LONG TERM ANTICOAGULATION: Primary | ICD-10-CM

## 2020-06-26 DIAGNOSIS — I48.20 CHRONIC ATRIAL FIBRILLATION: ICD-10-CM

## 2020-06-26 LAB
ALBUMIN SERPL BCP-MCNC: 3.4 G/DL (ref 3.5–5.2)
ALP SERPL-CCNC: 103 U/L (ref 55–135)
ALT SERPL W/O P-5'-P-CCNC: 12 U/L (ref 10–44)
ANION GAP SERPL CALC-SCNC: 10 MMOL/L (ref 8–16)
APTT BLDCRRT: 28.9 SEC (ref 21–32)
AST SERPL-CCNC: 18 U/L (ref 10–40)
BASOPHILS # BLD AUTO: 0.02 K/UL (ref 0–0.2)
BASOPHILS NFR BLD: 0.7 % (ref 0–1.9)
BILIRUB SERPL-MCNC: 0.7 MG/DL (ref 0.1–1)
BUN SERPL-MCNC: 14 MG/DL (ref 8–23)
CALCIUM SERPL-MCNC: 8.8 MG/DL (ref 8.7–10.5)
CHLORIDE SERPL-SCNC: 103 MMOL/L (ref 95–110)
CK MB SERPL-MCNC: 2.1 NG/ML (ref 0.1–6.5)
CK MB SERPL-RTO: 3.8 % (ref 0–5)
CK SERPL-CCNC: 56 U/L (ref 20–180)
CO2 SERPL-SCNC: 26 MMOL/L (ref 23–29)
CREAT SERPL-MCNC: 0.8 MG/DL (ref 0.5–1.4)
DIFFERENTIAL METHOD: ABNORMAL
EOSINOPHIL # BLD AUTO: 0.1 K/UL (ref 0–0.5)
EOSINOPHIL NFR BLD: 2.2 % (ref 0–8)
ERYTHROCYTE [DISTWIDTH] IN BLOOD BY AUTOMATED COUNT: 14.7 % (ref 11.5–14.5)
EST. GFR  (AFRICAN AMERICAN): >60 ML/MIN/1.73 M^2
EST. GFR  (NON AFRICAN AMERICAN): >60 ML/MIN/1.73 M^2
GLUCOSE SERPL-MCNC: 75 MG/DL (ref 70–110)
HCT VFR BLD AUTO: 32.5 % (ref 37–48.5)
HGB BLD-MCNC: 10.7 G/DL (ref 12–16)
IMM GRANULOCYTES # BLD AUTO: 0.01 K/UL (ref 0–0.04)
IMM GRANULOCYTES NFR BLD AUTO: 0.4 % (ref 0–0.5)
INR PPP: 1.6 (ref 0.8–1.2)
LYMPHOCYTES # BLD AUTO: 1 K/UL (ref 1–4.8)
LYMPHOCYTES NFR BLD: 36.4 % (ref 18–48)
MAGNESIUM SERPL-MCNC: 2.1 MG/DL (ref 1.6–2.6)
MCH RBC QN AUTO: 30 PG (ref 27–31)
MCHC RBC AUTO-ENTMCNC: 32.9 G/DL (ref 32–36)
MCV RBC AUTO: 91 FL (ref 82–98)
MONOCYTES # BLD AUTO: 0.3 K/UL (ref 0.3–1)
MONOCYTES NFR BLD: 11.5 % (ref 4–15)
NEUTROPHILS # BLD AUTO: 1.3 K/UL (ref 1.8–7.7)
NEUTROPHILS NFR BLD: 48.8 % (ref 38–73)
NRBC BLD-RTO: 0 /100 WBC
PHOSPHATE SERPL-MCNC: 3.8 MG/DL (ref 2.7–4.5)
PLATELET # BLD AUTO: 152 K/UL (ref 150–350)
PMV BLD AUTO: 10.2 FL (ref 9.2–12.9)
POTASSIUM SERPL-SCNC: 3.7 MMOL/L (ref 3.5–5.1)
PROT SERPL-MCNC: 6 G/DL (ref 6–8.4)
PROTHROMBIN TIME: 16 SEC (ref 9–12.5)
RBC # BLD AUTO: 3.57 M/UL (ref 4–5.4)
SODIUM SERPL-SCNC: 139 MMOL/L (ref 136–145)
TROPONIN I SERPL DL<=0.01 NG/ML-MCNC: <0.006 NG/ML (ref 0–0.03)
WBC # BLD AUTO: 2.69 K/UL (ref 3.9–12.7)

## 2020-06-26 PROCEDURE — 85025 COMPLETE CBC W/AUTO DIFF WBC: CPT | Mod: HCNC

## 2020-06-26 PROCEDURE — 93793 ANTICOAG MGMT PT WARFARIN: CPT | Mod: S$GLB,,,

## 2020-06-26 PROCEDURE — 99217 PR OBSERVATION CARE DISCHARGE: ICD-10-PCS | Mod: HCNC,,, | Performed by: PSYCHIATRY & NEUROLOGY

## 2020-06-26 PROCEDURE — 93793 PR ANTICOAGULANT MGMT FOR PT TAKING WARFARIN: ICD-10-PCS | Mod: S$GLB,,,

## 2020-06-26 PROCEDURE — 97802 MEDICAL NUTRITION INDIV IN: CPT | Mod: HCNC

## 2020-06-26 PROCEDURE — 83735 ASSAY OF MAGNESIUM: CPT | Mod: HCNC

## 2020-06-26 PROCEDURE — 99217 PR OBSERVATION CARE DISCHARGE: CPT | Mod: HCNC,,, | Performed by: PSYCHIATRY & NEUROLOGY

## 2020-06-26 PROCEDURE — 97535 SELF CARE MNGMENT TRAINING: CPT | Mod: HCNC

## 2020-06-26 PROCEDURE — 84484 ASSAY OF TROPONIN QUANT: CPT | Mod: HCNC

## 2020-06-26 PROCEDURE — 97165 OT EVAL LOW COMPLEX 30 MIN: CPT | Mod: HCNC

## 2020-06-26 PROCEDURE — 82553 CREATINE MB FRACTION: CPT | Mod: HCNC

## 2020-06-26 PROCEDURE — 85610 PROTHROMBIN TIME: CPT | Mod: HCNC

## 2020-06-26 PROCEDURE — 25000242 PHARM REV CODE 250 ALT 637 W/ HCPCS: Mod: HCNC | Performed by: NURSE PRACTITIONER

## 2020-06-26 PROCEDURE — 84100 ASSAY OF PHOSPHORUS: CPT | Mod: HCNC

## 2020-06-26 PROCEDURE — 25000003 PHARM REV CODE 250: Mod: HCNC | Performed by: NURSE PRACTITIONER

## 2020-06-26 PROCEDURE — 82550 ASSAY OF CK (CPK): CPT | Mod: HCNC

## 2020-06-26 PROCEDURE — 80053 COMPREHEN METABOLIC PANEL: CPT | Mod: HCNC

## 2020-06-26 PROCEDURE — 36415 COLL VENOUS BLD VENIPUNCTURE: CPT | Mod: HCNC

## 2020-06-26 PROCEDURE — G0378 HOSPITAL OBSERVATION PER HR: HCPCS | Mod: HCNC

## 2020-06-26 PROCEDURE — 85730 THROMBOPLASTIN TIME PARTIAL: CPT | Mod: HCNC

## 2020-06-26 RX ORDER — WARFARIN SODIUM 5 MG/1
5 TABLET ORAL DAILY
Qty: 30 TABLET | Refills: 11 | Status: ON HOLD | OUTPATIENT
Start: 2020-06-26 | End: 2020-08-05 | Stop reason: HOSPADM

## 2020-06-26 RX ADMIN — FLUTICASONE PROPIONATE 100 MCG: 50 SPRAY, METERED NASAL at 10:06

## 2020-06-26 RX ADMIN — THERA TABS 1 TABLET: TAB at 10:06

## 2020-06-26 RX ADMIN — FUROSEMIDE 20 MG: 20 TABLET ORAL at 10:06

## 2020-06-26 RX ADMIN — MUPIROCIN: 20 OINTMENT TOPICAL at 10:06

## 2020-06-26 NOTE — ASSESSMENT & PLAN NOTE
87 y/o female with history of Afib (warfarin), CHF, CKD III, CAD, HLD, HTN, prior TIA now with isolated BRAO vs GCA.  Patient with constellation of symptoms concerning for GCA on admission- jaw pain/fullness. MRI/MRA unremarkable.Sed rate and CRP unremarkable. Patient denies scalp tenderness or headache. Vision intact. TCD with normal temporal arteries. GSC unlikely, prednisone discontinued. 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 Patient to continue increased dose of Coumadin (5mg) and follow up in Coumadin clinic. Will continue pravastatin 40 mg, reports intolerance of atorvastatin. Work up complete, patient to discharge home today and follow up in stroke clinic in 4-6 weeks.    Plan:  -Antithrombotic: Continue home warfarin increased dose to 5mg (INR 1.7)  -Statin:  Pravastatin 40 mg   -BP goal = normotension

## 2020-06-26 NOTE — SUBJECTIVE & OBJECTIVE
Neurologic Chief Complaint: left eye vision loss    Subjective:     Interval History: Patient is seen for follow-up neurological assessment and treatment recommendations:     Sed rate and CRP unremarkable. Patietn denies scalp tenderness or headache. Vision intact. TCD with normal temporal arteries. GSC unlikely, prednisone discontinued. 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 Patient to continue increased dose of Coumadin (5mg) and follow up in Coumadin clinic. Will continue pravastatin 40 mg, reports intolerance of atorvastatin. Work up complete, patient to discharge home today and follow up in stroke clinic in 4-6 weeks.     HPI, Past Medical, Family, and Social History remains the same as documented in the initial encounter.     Review of Systems   HENT: Positive for hearing loss (chronic ). Negative for trouble swallowing.    Eyes: Positive for visual disturbance (resolved).   Cardiovascular: Negative for chest pain and palpitations.   Gastrointestinal: Negative for diarrhea.   Musculoskeletal: Positive for arthralgias.   Skin: Positive for wound (left shin). Negative for color change and pallor.   Neurological: Negative for dizziness, facial asymmetry, speech difficulty, weakness, light-headedness and headaches.   Psychiatric/Behavioral: Negative for confusion and decreased concentration.     Scheduled Meds:   fluticasone propionate  2 spray Each Nostril Daily    furosemide  20 mg Oral Daily    multivitamin  1 tablet Oral Daily    mupirocin   Topical (Top) TID    potassium chloride  10 mEq Oral Daily    warfarin  5 mg Oral Daily     Continuous Infusions:   sodium chloride 0.9%       PRN Meds:sodium chloride 0.9%, sodium chloride 0.9%    Objective:     Vital Signs (Most Recent):  Temp: 98 °F (36.7 °C) (06/26/20 0730)  Pulse: 75 (06/26/20 0757)  Resp: 16 (06/26/20 0730)  BP: (!) 141/67 (06/26/20 0730)  SpO2: 98 % (06/26/20 0730)  BP Location: Left arm    Vital Signs Range (Last 24H):  Temp:  [97.2 °F (36.2 °C)-98.9 °F (37.2  °C)]   Pulse:  [62-96]   Resp:  [15-20]   BP: (108-177)/(52-92)   SpO2:  [97 %-100 %]   BP Location: Left arm    Physical Exam  HENT:      Head: Normocephalic and atraumatic.   Eyes:      General: Vision grossly intact.      Extraocular Movements: Extraocular movements intact.   Pulmonary:      Effort: Pulmonary effort is normal.   Abdominal:      General: Abdomen is flat.   Musculoskeletal: Normal range of motion.   Skin:     General: Skin is warm and dry.   Neurological:      Mental Status: She is alert and oriented to person, place, and time.   Psychiatric:         Mood and Affect: Mood normal.         Neurological Exam:   LOC: alert  Attention Span: Good   Language: No aphasia  Articulation: No dysarthria  Orientation: Person, Place, Time   Visual Fields: Full  EOM (CN III, IV, VI): Full/intact  Pupils (CN II, III): PERRL  Facial Sensation (CN V): Normal  Facial Movement (CN VII): Symmetric facial expression    Motor: Arm left  Normal 5/5  Leg left  Normal 5/5  Arm right  Normal 5/5  Leg right Normal 5/5  Cebellar: No evidence of appendicular or axial ataxia  Sensation: Intact to light touch, temperature and vibration  Tone: Normal tone throughout    Laboratory:  CMP:   Recent Labs   Lab 06/26/20  0415   CALCIUM 8.8   ALBUMIN 3.4*   PROT 6.0      K 3.7   CO2 26      BUN 14   CREATININE 0.8   ALKPHOS 103   ALT 12   AST 18   BILITOT 0.7     BMP:   Recent Labs   Lab 06/26/20  0415      K 3.7      CO2 26   BUN 14   CREATININE 0.8   CALCIUM 8.8     CBC:   Recent Labs   Lab 06/26/20  0415   WBC 2.69*   RBC 3.57*   HGB 10.7*   HCT 32.5*      MCV 91   MCH 30.0   MCHC 32.9     Lipid Panel:   Recent Labs   Lab 06/25/20  2145   CHOL 206*   LDLCALC 114.2   HDL 74   TRIG 89     Coagulation:   Recent Labs   Lab 06/26/20 0415   INR 1.6*   APTT 28.9     Platelet Aggregation Study: No results for input(s): PLTAGG, PLTAGINTERP, PLTAGREGLACO, ADPPLTAGGREG in the last 168 hours.  Hgb A1C:   Recent  Labs   Lab 06/25/20  2145   HGBA1C 5.6     TSH:   Recent Labs   Lab 06/25/20  2145   TSH 2.834       Diagnostic Results     Brain Imaging     MRI brain 6/25/20    1. No acute intracranial abnormality.  2. Generalized cerebral volume loss and chronic microvascular ischemic change.  3. No significant arterial abnormalities.  4. Bilateral mastoid effusions.        Vessel Imaging     MRA brain/ neck 6/25/20  No significant arterial abnormalities    TCD 6/26/20  Normal temporal arteries.     No obvious findings to suggest Giant Cell Arteritis.    Cardiac Imaging

## 2020-06-26 NOTE — PLAN OF CARE
Eval completed; no follow up OT needs at this time. OT to sign off. Recommending home with 0 OT needs. ZACHARIAH Flanagan 6/26/2020

## 2020-06-26 NOTE — LETTER
July 1, 2020     Jacobshade Jimenez  [address] [place] [address]  [id number]    Dear Ms. Tony,     Welcome to [facility] Complex Care Management Program.  It was a pleasure talking with you today.  My name is Brisa Al, and I look forward to being your Care Manager.  My goal is to help you function at the healthiest and highest level possible.  You can contact me directly at [phone].    As an [facility] patient with [employer], some of the services we may be able to provide include:      Development of an individualized care plan with a Registered Nurse    Connection with a    Connection with available resources and services     Coordinate communication among your care team members    Provide coaching and education    Help you understand your doctors treatment plan   Help you obtain information about your insurance coverage.     All services provided by [facility] Complex Care Managers and other care team members are coordinated with and communicated to your primary care team.    As part of your enrollment, you will be receiving education materials and more information about these services in your [facility] account, by phone or through the mail.  If you do not wish to participate or receive information, please contact our office at [phone].      Sincerely,        Brisa Al, RN  [facility]   Out-patient RN Complex Care Manager   [phone]

## 2020-06-26 NOTE — CONSULTS
"  [facility]  Adult Nutrition  Consult Note    SUMMARY     Recommendations    1.) ADAT to cardiac diet restrictions; texture per SLP.   2.) Add MVI.   3.) Daily weights    Goals: 1.) Pt to return/meet >75% EEN and EPN by follow up.   Nutrition Goal Status: new  Communication of RD Recs: other (comment)(POC)    Reason for Assessment    Reason For Assessment: consult(stroke pathway)  Diagnosis: other (see comments)(BRVO)  Relevant Medical History: HTN, HLD, TIA, CAD, CHF, CKD3  Interdisciplinary Rounds: did not attend  General Information Comments: Pt remains NPO for stroke rule out. PTA: pt reports good appetite, consuming 2-3 meals/day. No GI distress. Pt reports # with wt discrepancies per chart review. UBW 84.6kg (2019), 2020-79.6kg, CBW 71.6kg. Weights varying most likely related to diuresis. Will monitor weights as pt with stable wt ~180#. NFPE completed with no s/s wasting. Pt does not meet malnutrition criteria at this time.   Nutrition Discharge Planning: Provided cardiac diet education. Pt reports following a low Na diet for years now. Emphasized no added salt, ready to eat meals, and portion sizes. Pt verbalized understanding. Handouts provided to bedside with RD contact information.     Nutrition Risk Screen    Nutrition Risk Screen: no indicators present    Nutrition/Diet History    Spiritual, Cultural Beliefs, Islam Practices, Values that Affect Care: no  Food Allergies: NKFA  Factors Affecting Nutritional Intake: NPO    Anthropometrics    Temp: 98 °F (36.7 °C)  Height Method: Measured  Height: 5' 6" (167.6 cm)  Height (inches): 66 in  Weight Method: Bed Scale  Weight: 71.6 kg (157 lb 13.6 oz)  Weight (lb): 157.85 lb  Ideal Body Weight (IBW), Female: 130 lb  % Ideal Body Weight, Female (lb): 121.42 %  BMI (Calculated): 25.5  BMI Grade: 25 - 29.9 - overweight  Usual Body Weight (UBW), k.6 kg(2019)  % Usual Body Weight: 84.81   "     Lab/Procedures/Meds    Pertinent Labs Reviewed: reviewed  Pertinent Labs Comments: HbA1C 5.6, albumin 3.4, cholesterol 206  Pertinent Medications Reviewed: reviewed  Pertinent Medications Comments: lasix, MVI, coumadin    Estimated/Assessed Needs    Weight Used For Calorie Calculations: 71.6 kg (157 lb 13.6 oz)  Energy Calorie Requirements (kcal): 1454  Energy Need Method: McKinley-St Jeor(x1.25 PAL)  Protein Requirements: 57-72(g/day)  Weight Used For Protein Calculations: 71.6 kg (157 lb 13.6 oz)(0.8-1.0 g/kg)     Estimated Fluid Requirement Method: RDA Method(or per MD)  RDA Method (mL): 1454     Nutrition Prescription Ordered    Current Diet Order: NPO    Evaluation of Received Nutrient/Fluid Intake    I/O: -1L since admit  Energy Calories Required: not meeting needs  Protein Required: not meeting needs  Fluid Required: not meeting needs  Comments: LBM 6/25  % Intake of Estimated Energy Needs: 0 - 25 %  % Meal Intake: NPO    Nutrition Risk    Level of Risk/Frequency of Follow-up: high(x2/week)     Assessment and Plan  Nutrition Problem  Inadequate energy intake    Related to (etiology):   Decreased ability to consume sufficient energy    Signs and Symptoms (as evidenced by):   <75% of nutritional needs being met; NPO     Interventions(treatment strategy):  Collaboration of care with other providers  Referral of care  Nutrition education-cardiac diet    Nutrition Diagnosis Status:   New       Monitor and Evaluation    Food and Nutrient Intake: energy intake  Food and Nutrient Adminstration: diet order  Knowledge/Beliefs/Attitudes: food and nutrition knowledge/skill  Physical Activity and Function: nutrition-related ADLs and IADLs  Anthropometric Measurements: weight, weight change, body mass index  Biochemical Data, Medical Tests and Procedures: electrolyte and renal panel, gastrointestinal profile, glucose/endocrine profile, inflammatory profile  Nutrition-Focused Physical Findings: overall appearance      Malnutrition Assessment             Weight Loss (Malnutrition): (wt varying due to lasix)   Orbital Region (Subcutaneous Fat Loss): well nourished  Upper Arm Region (Subcutaneous Fat Loss): well nourished   Sikh Region (Muscle Loss): well nourished  Clavicle Bone Region (Muscle Loss): well nourished  Clavicle and Acromion Bone Region (Muscle Loss): well nourished  Dorsal Hand (Muscle Loss): well nourished  Patellar Region (Muscle Loss): well nourished  Anterior Thigh Region (Muscle Loss): well nourished  Posterior Calf Region (Muscle Loss): well nourished   Edema (Fluid Accumulation): 0-->no edema present   Subcutaneous Fat Loss (Final Summary): well nourished  Muscle Loss Evaluation (Final Summary): well nourished         Nutrition Follow-Up    RD Follow-up?: Yes

## 2020-06-26 NOTE — PLAN OF CARE
Patient medically ready for discharge to Home no needs. Any necessary transport setup by . This CM scheduled or requested necessary follow-up appointments. Message sent to Dr. Montelongo [facility] to schedule BRVO follow up    Future Appointments   Date Time Provider Department Center   7/6/2020  8:20 AM Daysi Coello MD [facility] DERM [place]   7/7/2020 10:00 AM Himanshu Queen DO [facility] IM [place]   7/7/2020 11:45 AM [facility] CT1 64- LIMIT 650 LBS [facility] CT SCAN [facility]   7/7/2020  1:00 PM Abundio Curtis MD [facility] CARDVAL [facility]   7/14/2020  1:00 PM MARK Hernandez MD [facility] OPHTHAL [facility]   7/15/2020 11:30 AM Hilda Vences DPM [facility] PODIAT [facility]   7/16/2020  8:15 AM LAB, [place] METH LAB [place]   7/20/2020  7:30 AM Kaila Aguilar NP [facility] IM [place]   7/20/2020  8:00 AM [place], ALLERGY INJECTION [facility] IM [place]       Nava Wu, RN  Case Management  Ext: [phone]  06/26/2020  12:15 PM

## 2020-06-26 NOTE — LETTER
June 29, 2020    Jenniffer Wheeler Tony  [address] [place] [id number]             [facility] [address] [place] [address] Dear Ms. Jimenez,     I work with [facility] Outpatient Case Management Department. We received a referral to call you to discuss your medical history.These services are free of charge and are offered to [facility] patients who have recently been discharged from any of our facilities or who have complex medical conditions that may require the skill of a nurse to assist with management.             I am a Registered Nurse who specializes in connecting patients with available medical and financial resources as well as addressing any educational needs that may be indicated.      I attempted to reach you by telephone, but I was unsuccessful. Please call our department so that we can go over some questions with you regarding your health.    The Outpatient Case Management Department can be reached at [phone] from 8:00AM to 4:30 PM on Monday thru Friday. [facility] also has a program where a nurse is available 24/7 to answer questions or provide medical advice, their number is [phone].    Thanks,      Brisa Al, RN Case Manager  Outpatient Complex Case Management/Disease Management   [phone]

## 2020-06-26 NOTE — PLAN OF CARE
CM met with patient in room for Discharge Planning Assessment.  Per patient,  patient lives with alone in a(n) SS with ramp to enter.   Per patient, patient was independent with ADLS and used rollator for ambulation.  Per patient, the patient will have assistance from daughter upon discharge.  Discharge Planning Booklet given to patient/family and discussed.  All questions addressed.       PCP:  Rubi Young DO      Pharmacy:    [facility] [address] [facility]  [address] [facility] [address]  Phone: [phone] Fax: [phone]    [id number] - [place] - [address] AT [facility] [address]  [address] [place] [address]  Phone: [phone] Fax: [phone]        Emergency Contacts:  Extended Emergency Contact Information  Primary Emergency Contact: Afshan Duenas   [facility] Phone: [phone]  Relation: Daughter  Secondary Emergency Contact: Paul Duenas  Mobile Phone: [phone]  Relation: Healthcare Power of       Insurance:    Payor: [employer] MANAGED MEDICARE / Plan: [employer] MEDICARE HMO / Product Type: Capitation /        06/26/20 1202   Discharge Assessment   Assessment Type Discharge Planning Assessment   Confirmed/corrected address and phone number on facesheet? Yes   Assessment information obtained from? Patient   Expected Length of Stay (days) 1   Communicated expected length of stay with patient/caregiver yes   Prior to hospitilization cognitive status: Alert/Oriented;No Deficits   Prior to hospitalization functional status: Independent   Current cognitive status: Alert/Oriented;No Deficits   Current Functional Status: Independent   Lives With alone   Able to Return to Prior Arrangements yes   Is patient able to care for self after discharge? Yes   Who are your caregiver(s) and their phone number(s)? Afshan Duenas Daughter [phone]   Patient's perception of  discharge disposition home or selfcare   Readmission Within the Last 30 Days no previous admission in last 30 days   Patient currently being followed by outpatient case management? No   Patient currently receives any other outside agency services? No   Equipment Currently Used at Home cane, straight;cane, quad;rollator;walker, rolling;grab bar;bath bench   Do you have any problems affording any of your prescribed medications? No   Is the patient taking medications as prescribed? yes   Does the patient have transportation home? Yes   Transportation Anticipated health plan transportation   Does the patient receive services at the Coumadin Clinic? Yes  ([facility])   Discharge Plan A Home   Discharge Plan B Home   DME Needed Upon Discharge  none   Patient/Family in Agreement with Plan yes       Nava Wu RN  Case Management  Ext: [phone]  06/26/2020  12:05 PM

## 2020-06-26 NOTE — HPI
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  Patient initially presented with complaints of sudden vision loss lasting about 1 minute.  Her vision returned but now she is seeing colors and flashing lights, along with swirling circles.  Patient reports associated eye pain, HA, and Jaw pain/fullness.  Patient had a prior history of BRVO in 2002.  Patient has significant eye disease history.      Of note, patient has been subtherapeutic on warfarin but reports taking her medication without missing doses.

## 2020-06-26 NOTE — ASSESSMENT & PLAN NOTE
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  Patient with constellation of symptoms concerning for GCA. MRI/MRA unremarkable.    Plan:  -Prednisone 60mg daily.  -Temporal Artery US  -Antithrombotic: Continue home warfarin increased dose to 5mg (INR 1.7)  -Statin:  Atorvastatin 40mg  -BP goal = normotension

## 2020-06-26 NOTE — PLAN OF CARE
Problem: Eating/Swallowing Impairment (Stroke, Ischemic/Transient Ischemic Attack)  Goal: Oral Intake without Aspiration  Outcome: Ongoing, Progressing  Intervention: Optimize Eating and Swallowing  Flowsheets (Taken 6/26/2020 1102)  Nutrition Support Management:   weight trending reviewed   other (see comments)     Problem: Oral Intake Inadequate  Goal: Improved Oral Intake  Outcome: Ongoing, Progressing  Intervention: Promote and Optimize Oral Intake  Flowsheets (Taken 6/26/2020 1102)  Oral Nutrition Promotion: other (see comments)     Recommendations     1.) ADAT to cardiac diet restrictions; texture per SLP.   2.) Add MVI.   3.) Daily weights     Goals: 1.) Pt to return/meet >75% EEN and EPN by follow up.   Nutrition Goal Status: new  Communication of RD Recs: other (comment)(POC)

## 2020-06-26 NOTE — PROGRESS NOTES
Nava/Case/manager([id number]) called 06/26/20 to inform us that patient was discharged today from ([facility]) patient went home on ([id number]) daily. request inr in 1 week.Please advise.

## 2020-06-26 NOTE — PROGRESS NOTES
[facility]  Vascular Neurology  Comprehensive Stroke Center  Progress Note    Assessment/Plan:     * BRVO (branch retinal vein occlusion)  89 y/o female with history of Afib (warfarin), CHF, CKD III, CAD, HLD, HTN, prior TIA now with isolated BRAO vs GCA.  Patient with constellation of symptoms concerning for GCA on admission- jaw pain/fullness. MRI/MRA unremarkable.Sed rate and CRP unremarkable. Patient denies scalp tenderness or headache. Vision intact. TCD with normal temporal arteries. GSC unlikely, prednisone discontinued. 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 Patient to continue increased dose of Coumadin (5mg) and follow up in Coumadin clinic. Will continue pravastatin 40 mg, reports intolerance of atorvastatin. Work up complete, patient to discharge home today and follow up in stroke clinic in 4-6 weeks.    Plan:  -Antithrombotic: Continue home warfarin increased dose to 5mg (INR 1.7)  -Statin:  Pravastatin 40 mg   -BP goal = normotension    Chronic atrial fibrillation  Has been taking warfarin without missing doses  Subtherapeutic 1.6 today  Warfarin 5 mg daily  Daily INR    Hypertension  Stroke Risk Factor  Continue home meds      Pure hypercholesterolemia  Stroke Risk Factor  Reporting muscle aches with atorvastatin   Continue home pravastatin 40 mg          No notes on file    STROKE DOCUMENTATION        NIH Scale:  1a. Level of Consciousness: 0-->Alert, keenly responsive  1b. LOC Questions: 0-->Answers both questions correctly  1c. LOC Commands: 0-->Performs both tasks correctly  2. Best Gaze: 0-->Normal  3. Visual: 0-->No visual loss  4. Facial Palsy: 0-->Normal symmetrical movements  5a. Motor Arm, Left: 0-->No drift, limb holds 90 (or 45) degrees for full 10 secs  5b. Motor Arm, Right: 0-->No drift, limb holds 90 (or 45) degrees for full 10 secs  6a. Motor Leg, Left: 0-->No drift, leg holds 30 degree position for full 5 secs  6b. Motor Leg, Right: 0-->No drift, leg holds 30 degree position for  full 5 secs  7. Limb Ataxia: 0-->Absent  8. Sensory: 0-->Normal, no sensory loss  9. Best Language: 0-->No aphasia, normal  10. Dysarthria: 0-->Normal  11. Extinction and Inattention (formerly Neglect): 0-->No abnormality  Total (NIH Stroke Scale): 0       Modified Alma Score: 0  Madan Coma Scale:    ABCD2 Score:    [id number] Score:   HAS -BLED Score:   ICH Score:   Hunt & Bosch Classification:      Hemorrhagic change of an Ischemic Stroke: Does this patient have an ischemic stroke with hemorrhagic changes? No     Neurologic Chief Complaint: left eye vision loss    Subjective:     Interval History: Patient is seen for follow-up neurological assessment and treatment recommendations:     Sed rate and CRP unremarkable. Patietn denies scalp tenderness or headache. Vision intact. TCD with normal temporal arteries. GSC unlikely, prednisone discontinued. 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 Patient to continue increased dose of Coumadin (5mg) and follow up in Coumadin clinic. Will continue pravastatin 40 mg, reports intolerance of atorvastatin. Work up complete, patient to discharge home today and follow up in stroke clinic in 4-6 weeks.     HPI, Past Medical, Family, and Social History remains the same as documented in the initial encounter.     Review of Systems   HENT: Positive for hearing loss (chronic ). Negative for trouble swallowing.    Eyes: Positive for visual disturbance (resolved).   Cardiovascular: Negative for chest pain and palpitations.   Gastrointestinal: Negative for diarrhea.   Musculoskeletal: Positive for arthralgias.   Skin: Positive for wound (left shin). Negative for color change and pallor.   Neurological: Negative for dizziness, facial asymmetry, speech difficulty, weakness, light-headedness and headaches.   Psychiatric/Behavioral: Negative for confusion and decreased concentration.     Scheduled Meds:   fluticasone propionate  2 spray Each Nostril Daily    furosemide  20 mg Oral Daily    multivitamin  1  tablet Oral Daily    mupirocin   Topical (Top) TID    potassium chloride  10 mEq Oral Daily    warfarin  5 mg Oral Daily     Continuous Infusions:   sodium chloride 0.9%       PRN Meds:sodium chloride 0.9%, sodium chloride 0.9%    Objective:     Vital Signs (Most Recent):  Temp: 98 °F (36.7 °C) (06/26/20 0730)  Pulse: 75 (06/26/20 0757)  Resp: 16 (06/26/20 0730)  BP: (!) 141/67 (06/26/20 0730)  SpO2: 98 % (06/26/20 0730)  BP Location: Left arm    Vital Signs Range (Last 24H):  Temp:  [97.2 °F (36.2 °C)-98.9 °F (37.2 °C)]   Pulse:  [62-96]   Resp:  [15-20]   BP: (108-177)/(52-92)   SpO2:  [97 %-100 %]   BP Location: Left arm    Physical Exam  HENT:      Head: Normocephalic and atraumatic.   Eyes:      General: Vision grossly intact.      Extraocular Movements: Extraocular movements intact.   Pulmonary:      Effort: Pulmonary effort is normal.   Abdominal:      General: Abdomen is flat.   Musculoskeletal: Normal range of motion.   Skin:     General: Skin is warm and dry.   Neurological:      Mental Status: She is alert and oriented to person, place, and time.   Psychiatric:         Mood and Affect: Mood normal.         Neurological Exam:   LOC: alert  Attention Span: Good   Language: No aphasia  Articulation: No dysarthria  Orientation: Person, Place, Time   Visual Fields: Full  EOM (CN III, IV, VI): Full/intact  Pupils (CN II, III): PERRL  Facial Sensation (CN V): Normal  Facial Movement (CN VII): Symmetric facial expression    Motor: Arm left  Normal 5/5  Leg left  Normal 5/5  Arm right  Normal 5/5  Leg right Normal 5/5  Cebellar: No evidence of appendicular or axial ataxia  Sensation: Intact to light touch, temperature and vibration  Tone: Normal tone throughout    Laboratory:  CMP:   Recent Labs   Lab 06/26/20 0415   CALCIUM 8.8   ALBUMIN 3.4*   PROT 6.0      K 3.7   CO2 26      BUN 14   CREATININE 0.8   ALKPHOS 103   ALT 12   AST 18   BILITOT 0.7     BMP:   Recent Labs   Lab 06/26/20 0415   NA  139   K 3.7      CO2 26   BUN 14   CREATININE 0.8   CALCIUM 8.8     CBC:   Recent Labs   Lab 06/26/20 0415   WBC 2.69*   RBC 3.57*   HGB 10.7*   HCT 32.5*      MCV 91   MCH 30.0   MCHC 32.9     Lipid Panel:   Recent Labs   Lab 06/25/20 2145   CHOL 206*   LDLCALC 114.2   HDL 74   TRIG 89     Coagulation:   Recent Labs   Lab 06/26/20 0415   INR 1.6*   APTT 28.9     Platelet Aggregation Study: No results for input(s): PLTAGG, PLTAGINTERP, PLTAGREGLACO, ADPPLTAGGREG in the last 168 hours.  Hgb A1C:   Recent Labs   Lab 06/25/20 2145   HGBA1C 5.6     TSH:   Recent Labs   Lab 06/25/20 2145   TSH 2.834       Diagnostic Results     Brain Imaging     MRI brain 6/25/20    1. No acute intracranial abnormality.  2. Generalized cerebral volume loss and chronic microvascular ischemic change.  3. No significant arterial abnormalities.  4. Bilateral mastoid effusions.        Vessel Imaging     MRA brain/ neck 6/25/20  No significant arterial abnormalities    TCD 6/26/20  Normal temporal arteries.     No obvious findings to suggest Giant Cell Arteritis.    Cardiac Imaging         Neema Castro NP  [facility] Stroke [facility]  Department of Vascular Neurology   [facility]

## 2020-06-26 NOTE — PLAN OF CARE
06/26/20 1347   Post-Acute Status   Post-Acute Authorization Other   Other Status See Comments       Pt setup with [facility] for 2pm pickup. Nurse notified to bring the patient to the [facility] entrance.  SW in contact with CM and Medical staff. Will continue to follow and offer support as needed.     Nirmal Geiger, WES  [facility]   Ext. [phone]

## 2020-06-26 NOTE — ASSESSMENT & PLAN NOTE
Has been taking warfarin without missing doses  Subtherapeutic 1.7  Warfarin 5 mg daily  Daily INR

## 2020-06-26 NOTE — SUBJECTIVE & OBJECTIVE
Past Medical History:   Diagnosis Date    Amblyopia of left eye 4/10/2013    Anticoagulant long-term use     Coumadin - A-Fib    Anxiety     Arthritis     Facet arthropathy, Lumbosacral    Atherosclerosis of aorta     Atrial fibrillation     Auricular fibrillation     Central retinal vein occlusion of left eye     CHF (congestive heart failure)     Chronic kidney disease, stage 3     Coronary artery disease     Depression     Diastolic heart failure 2014    Exotropia of both eyes 2013    recession RSR 5.0mm w/ adj; recession LR os 5.0 w/ adj; resect MR os  4.0mm    Hearing loss     History of resection of small bowel     Hyperlipidemia     Hypertension     Hypertensive retinopathy of both eyes     Hypoglycemia     Macular degeneration     Meniere's disease of both ears     OA (osteoarthritis) of shoulder     Right    Osteoporosis     Other and unspecified hyperlipidemia     Posterior vitreous detachment of both eyes     Rhinitis     TIA (transient ischemic attack)      Past Surgical History:   Procedure Laterality Date    APPENDECTOMY      CARDIAC CATHETERIZATION      CATARACT EXTRACTION W/  INTRAOCULAR LENS IMPLANT Bilateral      SECTION, CLASSIC      HYSTERECTOMY      INNER EAR SURGERY      JOINT REPLACEMENT      LEFT KNEE REPLACEMENT IN  -    OOPHORECTOMY      SINUS SURGERY      STRABISMUS SURGERY  13    RSR recession 5 mm, LLR recession 5 mm and LMR resection 4mm    STRABISMUS SURGERY  2014    recess LR OD 6mm    TONSILLECTOMY       Family History   Problem Relation Age of Onset    Hypertension Mother     Hypertension Father     Liver disease Sister     Diabetes Sister     Heart disease Sister     Diabetes Brother     Breast cancer Maternal Aunt     No Known Problems Maternal Uncle     No Known Problems Paternal Aunt     No Known Problems Paternal Uncle     No Known Problems Maternal Grandmother     No Known Problems Maternal  "Grandfather     No Known Problems Paternal Grandmother     No Known Problems Paternal Grandfather     No Known Problems Daughter     No Known Problems Son     Heart disease Sister     No Known Problems Son     No Known Problems Son     Cancer Neg Hx         in first degree relatives    Melanoma Neg Hx     Psoriasis Neg Hx     Lupus Neg Hx     Amblyopia Neg Hx     Blindness Neg Hx     Cataracts Neg Hx     Glaucoma Neg Hx     Macular degeneration Neg Hx     Retinal detachment Neg Hx     Strabismus Neg Hx     Stroke Neg Hx     Thyroid disease Neg Hx      Social History     Tobacco Use    Smoking status: Former Smoker     Types: Cigarettes     Quit date: 10/29/1982     Years since quittin.6    Smokeless tobacco: Never Used   Substance Use Topics    Alcohol use: Yes     Alcohol/week: 0.0 standard drinks     Comment: socially    Drug use: No     Review of patient's allergies indicates:   Allergen Reactions    Bactrim [sulfamethoxazole-trimethoprim] Other (See Comments)     "Couldn't walk"    Opioids - morphine analogues Other (See Comments)     Bowel issues; bowel obstruction    Tizanidine Other (See Comments)     "Lips were numb,  Almost passed out."    Tramadol Hallucinations    Beta-blockers (beta-adrenergic blocking agts) Other (See Comments)     Can not go on beta blockers for long period of time - due to taking allergy injections    Phenergan [promethazine] Other (See Comments)     Per pt. request- saw sisters have bad reaction to drug       Medications: I have reviewed the current medication administration record.    (Not in a hospital admission)      Review of Systems   Constitutional: Negative for appetite change, chills and fever.   HENT: Positive for congestion, postnasal drip and trouble swallowing. Negative for voice change.    Eyes: Positive for pain, redness and visual disturbance.   Respiratory: Positive for cough. Negative for shortness of breath.    Cardiovascular: " Positive for chest pain and leg swelling. Negative for palpitations.   Gastrointestinal: Positive for diarrhea. Negative for nausea and vomiting.   Genitourinary: Positive for urgency. Negative for dysuria and hematuria.   Musculoskeletal: Positive for arthralgias and gait problem.   Skin: Positive for wound (left Shin).   Neurological: Positive for headaches. Negative for dizziness, speech difficulty, weakness and numbness.   Psychiatric/Behavioral: Negative for agitation and confusion.     Objective:     Vital Signs (Most Recent):  Temp: 98.9 °F (37.2 °C) (06/25/20 1734)  Pulse: 80 (06/25/20 2012)  Resp: 17 (06/25/20 2012)  BP: (!) 177/77 (06/25/20 2012)  SpO2: 98 % (06/25/20 2012)    Vital Signs Range (Last 24H):  Temp:  [98 °F (36.7 °C)-98.9 °F (37.2 °C)]   Pulse:  [73-96]   Resp:  [15-17]   BP: (135-177)/(77-92)   SpO2:  [97 %-100 %]     Physical Exam  Constitutional:       Appearance: Normal appearance.   HENT:      Head: Normocephalic and atraumatic.   Eyes:      Conjunctiva/sclera:      Left eye: Left conjunctiva is injected.   Cardiovascular:      Rate and Rhythm: Rhythm irregular.   Pulmonary:      Effort: Pulmonary effort is normal.      Breath sounds: Normal breath sounds.   Abdominal:      Palpations: Abdomen is soft.   Musculoskeletal:      Right lower leg: Edema present.      Left lower leg: Edema present.   Skin:     General: Skin is warm and dry.   Neurological:      Mental Status: She is alert.      Gait: Gait abnormal (Chronic).   Psychiatric:         Mood and Affect: Mood normal.         Behavior: Behavior normal.         Thought Content: Thought content normal.         Judgment: Judgment normal.         Neurological Exam:   LOC: alert  Attention Span: Good   Language: No aphasia  Articulation: No dysarthria  Orientation: Person, Place, Time   Visual Fields: Full  EOM (CN III, IV, VI): Full/intact  Pupils (CN II, III): PERRL  Facial Sensation (CN V): Normal  Facial Movement (CN VII): Symmetric  facial expression    Motor: Arm left  Normal 5/5  Leg left  Normal 5/5  Arm right  Normal 5/5  Leg right Normal 5/5  Cebellar: No evidence of appendicular or axial ataxia  Sensation: Intact to light touch, temperature and vibration  Tone: Normal tone throughout      Laboratory:  CMP:   Recent Labs   Lab 06/25/20  1434   CALCIUM 9.6   ALBUMIN 4.2   PROT 7.3      K 4.1   CO2 27      BUN 13   CREATININE 0.9   ALKPHOS 123   ALT 15   AST 21   BILITOT 0.8     CBC:   Recent Labs   Lab 06/25/20  1434   WBC 3.79*   RBC 3.91*   HGB 11.9*   HCT 36.0*      MCV 92   MCH 30.4   MCHC 33.1     Lipid Panel: No results for input(s): CHOL, LDLCALC, HDL, TRIG in the last 168 hours.  Coagulation:   Recent Labs   Lab 06/25/20  1434   INR 1.7*   APTT 30.2     Hgb A1C: No results for input(s): HGBA1C in the last 168 hours.  TSH: No results for input(s): TSH in the last 168 hours.    Diagnostic Results:      Brain imaging:  No early infarct signs or hemorrhage.    Vessel Imaging:  No high-grade stenosis or occlusion.

## 2020-06-26 NOTE — H&P
[facility]  Vascular Neurology  Comprehensive Stroke Center  History & Physical    Inpatient consult to Vascular (Stroke) Neurology  Consult performed by: Daria Diop NP  Consult ordered by: Brittani Herrera MD        Assessment/Plan:     Patient is a 88 y.o. year old female with:    * BRVO (branch retinal vein occlusion)  87 y/o female with history of Afib (warfarin), CHF, CKD III, CAD, HLD, HTN, prior TIA now with isolated BRAO vs GCA.  Patient with constellation of symptoms concerning for GCA. MRI/MRA unremarkable.    Plan:  -Prednisone 60mg daily.  -Temporal Artery US  -Antithrombotic: Continue home warfarin increased dose to 5mg (INR 1.7)  -Statin:  Atorvastatin 40mg  -BP goal = normotension    Chronic atrial fibrillation  Has been taking warfarin without missing doses  Subtherapeutic 1.7  Warfarin 5 mg daily  Daily INR    Balance problems  Chronic - currently at baseline    Hypertension  Stroke Risk Factor  Continue home meds      Pure hypercholesterolemia  Stroke Risk Factor  Atorvastatin 40mg        STROKE DOCUMENTATION          NIH Scale:  1a. Level of Consciousness: 0-->Alert, keenly responsive  1b. LOC Questions: 0-->Answers both questions correctly  1c. LOC Commands: 0-->Performs both tasks correctly  2. Best Gaze: 0-->Normal  3. Visual: 0-->No visual loss  4. Facial Palsy: 0-->Normal symmetrical movements  5a. Motor Arm, Left: 0-->No drift, limb holds 90 (or 45) degrees for full 10 secs  5b. Motor Arm, Right: 0-->No drift, limb holds 90 (or 45) degrees for full 10 secs  6a. Motor Leg, Left: 0-->No drift, leg holds 30 degree position for full 5 secs  6b. Motor Leg, Right: 0-->No drift, leg holds 30 degree position for full 5 secs  7. Limb Ataxia: 0-->Absent  8. Sensory: 0-->Normal, no sensory loss  9. Best Language: 0-->No aphasia, normal  10. Dysarthria: 0-->Normal  11. Extinction and Inattention (formerly Neglect): 0-->No abnormality  Total (NIH Stroke Scale): 0      Modified Maddie Score: 0  [place] Coma Scale:    ABCD2 Score:    [id number] Score:   HAS -BLED Score:   ICH Score:   Hunt & Bosch Classification:      Thrombolysis Candidate? No, Out of window     Delays to Thrombolysis?  No    Interventional Revascularization Candidate?   Is the patient eligible for mechanical endovascular reperfusion (MUKUL)?  No; No large vessel occlusion    Hemorrhagic change of an Ischemic Stroke: Does this patient have an ischemic stroke with hemorrhagic changes? No         Subjective:     History of Present Illness:  87 y/o female with history of Afib (warfarin), CHF, CKD III, CAD, HLD, HTN, prior TIA presents from the Optometry clinic for branch retinal vein occlusion.  Patient initially presented with complaints of sudden vision loss lasting about 1 minute.  Her vision returned but now she is seeing colors and flashing lights, along with swirling circles.  Patient reports associated eye pain, HA, and Jaw pain/fullness.  Patient had a prior history of BRVO in 2002.  Patient has significant eye disease history.      Of note, patient has been subtherapeutic on warfarin but reports taking her medication without missing doses.          Past Medical History:   Diagnosis Date    Amblyopia of left eye 4/10/2013    Anticoagulant long-term use     Coumadin - A-Fib    Anxiety     Arthritis     Facet arthropathy, Lumbosacral    Atherosclerosis of aorta     Atrial fibrillation     Auricular fibrillation     Central retinal vein occlusion of left eye     CHF (congestive heart failure)     Chronic kidney disease, stage 3     Coronary artery disease     Depression     Diastolic heart failure 8/20/2014    Exotropia of both eyes 2/6/2013    recession RSR 5.0mm w/ adj; recession LR os 5.0 w/ adj; resect MR os  4.0mm    Hearing loss     History of resection of small bowel     Hyperlipidemia     Hypertension     Hypertensive retinopathy of both eyes     Hypoglycemia     Macular  degeneration     Meniere's disease of both ears     OA (osteoarthritis) of shoulder     Right    Osteoporosis     Other and unspecified hyperlipidemia     Posterior vitreous detachment of both eyes     Rhinitis     TIA (transient ischemic attack)      Past Surgical History:   Procedure Laterality Date    APPENDECTOMY      CARDIAC CATHETERIZATION      CATARACT EXTRACTION W/  INTRAOCULAR LENS IMPLANT Bilateral      SECTION, CLASSIC      HYSTERECTOMY      INNER EAR SURGERY      JOINT REPLACEMENT      LEFT KNEE REPLACEMENT IN  -    OOPHORECTOMY      SINUS SURGERY      STRABISMUS SURGERY  13    RSR recession 5 mm, LLR recession 5 mm and LMR resection 4mm    STRABISMUS SURGERY  2014    recess LR OD 6mm    TONSILLECTOMY       Family History   Problem Relation Age of Onset    Hypertension Mother     Hypertension Father     Liver disease Sister     Diabetes Sister     Heart disease Sister     Diabetes Brother     Breast cancer Maternal Aunt     No Known Problems Maternal Uncle     No Known Problems Paternal Aunt     No Known Problems Paternal Uncle     No Known Problems Maternal Grandmother     No Known Problems Maternal Grandfather     No Known Problems Paternal Grandmother     No Known Problems Paternal Grandfather     No Known Problems Daughter     No Known Problems Son     Heart disease Sister     No Known Problems Son     No Known Problems Son     Cancer Neg Hx         in first degree relatives    Melanoma Neg Hx     Psoriasis Neg Hx     Lupus Neg Hx     Amblyopia Neg Hx     Blindness Neg Hx     Cataracts Neg Hx     Glaucoma Neg Hx     Macular degeneration Neg Hx     Retinal detachment Neg Hx     Strabismus Neg Hx     Stroke Neg Hx     Thyroid disease Neg Hx      Social History     Tobacco Use    Smoking status: Former Smoker     Types: Cigarettes     Quit date: 10/29/1982     Years since quittin.6    Smokeless tobacco: Never Used  "  Substance Use Topics    Alcohol use: Yes     Alcohol/week: 0.0 standard drinks     Comment: socially    Drug use: No     Review of patient's allergies indicates:   Allergen Reactions    Bactrim [sulfamethoxazole-trimethoprim] Other (See Comments)     "Couldn't walk"    Opioids - morphine analogues Other (See Comments)     Bowel issues; bowel obstruction    Tizanidine Other (See Comments)     "Lips were numb,  Almost passed out."    Tramadol Hallucinations    Beta-blockers (beta-adrenergic blocking agts) Other (See Comments)     Can not go on beta blockers for long period of time - due to taking allergy injections    Phenergan [promethazine] Other (See Comments)     Per pt. request- saw sisters have bad reaction to drug       Medications: I have reviewed the current medication administration record.    (Not in a hospital admission)      Review of Systems   Constitutional: Negative for appetite change, chills and fever.   HENT: Positive for congestion, postnasal drip and trouble swallowing. Negative for voice change.    Eyes: Positive for pain, redness and visual disturbance.   Respiratory: Positive for cough. Negative for shortness of breath.    Cardiovascular: Positive for chest pain and leg swelling. Negative for palpitations.   Gastrointestinal: Positive for diarrhea. Negative for nausea and vomiting.   Genitourinary: Positive for urgency. Negative for dysuria and hematuria.   Musculoskeletal: Positive for arthralgias and gait problem.   Skin: Positive for wound (left Shin).   Neurological: Positive for headaches. Negative for dizziness, speech difficulty, weakness and numbness.   Psychiatric/Behavioral: Negative for agitation and confusion.     Objective:     Vital Signs (Most Recent):  Temp: 98.9 °F (37.2 °C) (06/25/20 1734)  Pulse: 80 (06/25/20 2012)  Resp: 17 (06/25/20 2012)  BP: (!) 177/77 (06/25/20 2012)  SpO2: 98 % (06/25/20 2012)    Vital Signs Range (Last 24H):  Temp:  [98 °F (36.7 °C)-98.9 °F " (37.2 °C)]   Pulse:  [73-96]   Resp:  [15-17]   BP: (135-177)/(77-92)   SpO2:  [97 %-100 %]     Physical Exam  Constitutional:       Appearance: Normal appearance.   HENT:      Head: Normocephalic and atraumatic.   Eyes:      Conjunctiva/sclera:      Left eye: Left conjunctiva is injected.   Cardiovascular:      Rate and Rhythm: Rhythm irregular.   Pulmonary:      Effort: Pulmonary effort is normal.      Breath sounds: Normal breath sounds.   Abdominal:      Palpations: Abdomen is soft.   Musculoskeletal:      Right lower leg: Edema present.      Left lower leg: Edema present.   Skin:     General: Skin is warm and dry.   Neurological:      Mental Status: She is alert.      Gait: Gait abnormal (Chronic).   Psychiatric:         Mood and Affect: Mood normal.         Behavior: Behavior normal.         Thought Content: Thought content normal.         Judgment: Judgment normal.         Neurological Exam:   LOC: alert  Attention Span: Good   Language: No aphasia  Articulation: No dysarthria  Orientation: Person, Place, Time   Visual Fields: Full  EOM (CN III, IV, VI): Full/intact  Pupils (CN II, III): PERRL  Facial Sensation (CN V): Normal  Facial Movement (CN VII): Symmetric facial expression    Motor: Arm left  Normal 5/5  Leg left  Normal 5/5  Arm right  Normal 5/5  Leg right Normal 5/5  Cebellar: No evidence of appendicular or axial ataxia  Sensation: Intact to light touch, temperature and vibration  Tone: Normal tone throughout      Laboratory:  CMP:   Recent Labs   Lab 06/25/20  1434   CALCIUM 9.6   ALBUMIN 4.2   PROT 7.3      K 4.1   CO2 27      BUN 13   CREATININE 0.9   ALKPHOS 123   ALT 15   AST 21   BILITOT 0.8     CBC:   Recent Labs   Lab 06/25/20  1434   WBC 3.79*   RBC 3.91*   HGB 11.9*   HCT 36.0*      MCV 92   MCH 30.4   MCHC 33.1     Lipid Panel: No results for input(s): CHOL, LDLCALC, HDL, TRIG in the last 168 hours.  Coagulation:   Recent Labs   Lab 06/25/20  1434   INR 1.7*   APTT 30.2      Hgb A1C: No results for input(s): HGBA1C in the last 168 hours.  TSH: No results for input(s): TSH in the last 168 hours.    Diagnostic Results:      Brain imaging:  No early infarct signs or hemorrhage.    Vessel Imaging:  No high-grade stenosis or occlusion.            Daria Diop NP  [facility]  Department of Vascular Neurology   [facility]

## 2020-06-26 NOTE — PT/OT/SLP EVAL
Occupational Therapy   Evaluation and Discharge Note    Name: Jenniffer Jimenez  MRN: [id number]  Admitting Diagnosis:  BRVO (branch retinal vein occlusion)      Recommendations:     Discharge Recommendations: home  Discharge Equipment Recommendations:  none  Barriers to discharge:  None    Assessment:     Jenniffer Jimenez is a 88 y.o. female with a medical diagnosis of BRVO (branch retinal vein occlusion). At this time, patient is functioning at their prior level of function for ADLs, functional mobility and household mgmt tasks and does not require further acute OT services.     Plan:     During this hospitalization, patient does not require further acute OT services.  Please re-consult if situation changes.    · Plan of Care Reviewed with: patient    Subjective     Chief Complaint: none reported   Patient/Family Comments/goals: home    Occupational Profile:  Living Environment: Pt lives alone in [facility] with ramp to enter. Tub/shower combo.   Previous level of function: active and indep. 30-60 min daily on stationary bike. Care taker to cat.  Wears reading glasses. Reported using rollator when outside the home and gardening and using furniture with mobility within the home.  Roles and Routines: home & community dweller dweller, caregiver to self and pet. Enjoys gardening.   Equipment Used at home:  rollator, shower chair, grab bar  Assistance upon Discharge: limited 24 hour    Pain/Comfort:  · Pain Rating 1: 0/10  · Pain Rating Post-Intervention 1: 0/10    Patients cultural, spiritual, Yarsanism conflicts given the current situation: no    Objective:     Communicated with: RN prior to session.  Patient found seated on couch with telemetry upon OT entry to room.    General Precautions: Standard, fall, hearing impaired   Orthopedic Precautions:N/A   Braces: N/A     Occupational Performance:  Functional Mobility/Transfers:  · Patient completed Sit <> Stand Transfer with modified independence  with  no assistive  device   · Patient completed Step Transfer technique with modified independence with personal rollator  · Patient completed Toilet Transfer Step Transfer technique with modified independence with  personal rollator   · Functional Mobility: household and short community distance (mask donned in hallway) with Supervision and personal rollator  · Attempt to complete mobility without AD at household distance with mild LOB to R    Activities of Daily Living:  · Grooming: modified independence set up standing at sink for oral care  · Upper Body Dressing: modified independence seated on couch  · Lower Body Dressing: modified independence 4 point position to don B socks and shoes    Cognitive/Visual Perceptual:  Cognitive/Psychosocial Skills:     -       Oriented to: Person, Place, Time and Situation   -       Follows Commands/attention:Follows multistep  commands  -       Communication: clear/fluent  -       Memory: No Deficits noted  -       Safety awareness/insight to disability: intact   -       Mood/Affect/Coping skills/emotional control: Cooperative  Visual/Perceptual:      -Intact tracking and scanning for functional task    Physical Exam:  Postural examination/scapula alignment:    -       Rounded shoulders  Skin integrity: Visible skin intact  Edema:  None noted  Sensation:    -       Intact  Motor Planning:    -       Intact B UE  Dominant hand:    -       R  Upper Extremity Range of Motion:     -       Right Upper Extremity: WFL  -       Left Upper Extremity: WFL  Upper Extremity Strength:    -       Right Upper Extremity: WFL  -       Left Upper Extremity: WFL   Strength:    -       Right Upper Extremity: WFL  -       Left Upper Extremity: WFL  Fine Motor Coordination:    -       Intact  Left hand, finger to nose, Right hand, finger to nose, Left hand, manipulation of objects, Right hand, manipulation of objects and Right hand, graphomotor skills   Gross motor coordination:   WFL B UE    [facility] 6 Click  ADL:  [facility] Total Score: 24   Body mass index is 25.48 kg/m².  Vitals:    20 0757   BP:    Pulse: 75   Resp:    Temp:        Treatment & Education:  -Pt alert and agreeable to therapy session; edu on therapy role in care  -discussed best safety and use of rollator with all functional tasks and activites to aid in best balance   -Communication board updated; questions/concerns addressed within OT scope of practice    Education:    Patient left seated on couch with RN notified      History:     Past Medical History:   Diagnosis Date    Amblyopia of left eye 4/10/2013    Anticoagulant long-term use     Coumadin - A-Fib    Anxiety     Arthritis     Facet arthropathy, Lumbosacral    Atherosclerosis of aorta     Atrial fibrillation     Auricular fibrillation     Central retinal vein occlusion of left eye     CHF (congestive heart failure)     Chronic kidney disease, stage 3     Coronary artery disease     Depression     Diastolic heart failure 2014    Exotropia of both eyes 2013    recession RSR 5.0mm w/ adj; recession LR os 5.0 w/ adj; resect MR os  4.0mm    Hearing loss     History of resection of small bowel     Hyperlipidemia     Hypertension     Hypertensive retinopathy of both eyes     Hypoglycemia     Macular degeneration     Meniere's disease of both ears     OA (osteoarthritis) of shoulder     Right    Osteoporosis     Other and unspecified hyperlipidemia     Posterior vitreous detachment of both eyes     Rhinitis     TIA (transient ischemic attack)        Past Surgical History:   Procedure Laterality Date    APPENDECTOMY      CARDIAC CATHETERIZATION      CATARACT EXTRACTION W/  INTRAOCULAR LENS IMPLANT Bilateral      SECTION, CLASSIC      HYSTERECTOMY      INNER EAR SURGERY      JOINT REPLACEMENT      LEFT KNEE REPLACEMENT IN  -    OOPHORECTOMY      SINUS SURGERY      STRABISMUS SURGERY  13    RSR recession 5 mm, LLR recession 5 mm and LMR  resection 4mm    STRABISMUS SURGERY  03/19/2014    recess LR OD 6mm    TONSILLECTOMY         Time Tracking:     OT Date of Treatment: 06/26/20  OT Start Time: 1045  OT Stop Time: 1105  OT Total Time (min): 20 min    Billable Minutes:Evaluation 10  Self Care/Home Management 10    ZACHARIAH Flanagan  6/26/2020

## 2020-06-26 NOTE — ASSESSMENT & PLAN NOTE
Has been taking warfarin without missing doses  Subtherapeutic 1.6 today  Warfarin 5 mg daily  Daily INR

## 2020-06-26 NOTE — PLAN OF CARE
06/26/20 1215   Final Note   Assessment Type Final Discharge Note   Anticipated Discharge Disposition Home   Hospital Follow Up  Appt(s) scheduled? Yes   Discharge plans and expectations educations in teach back method with documentation complete? Yes   Right Care Referral Info   Post Acute Recommendation No Care   Post-Acute Status   Post-Acute Authorization Other   Other Status No Post-Acute Service Needs   Discharge Delays None known at this time

## 2020-06-26 NOTE — PLAN OF CARE
06/26/20 1142   Post-Acute Status   Post-Acute Authorization Other   Other Status No Post-Acute Service Needs       No SW needs noted upon D/C. SW in contact with CM and Medical staff. Will continue to follow and offer support as needed.     Nirmal Geiger, WES  [facility]   Ext. [phone]

## 2020-06-26 NOTE — PROGRESS NOTES
INR low and from ER. Patient sent to ER from urgent optometrist appt for vision changes. Pt worked up for TIA, GCA, and BRVO. Tests negative and dx with BRVO. Pt reports this happened to her in the past as well. MD changed Rx to 5mg tablet. Dose was missed yesterday. INR decreasing. We will adjust dose and plan for repeat INR Monday. Patient advised of dose and lab plan.

## 2020-06-27 NOTE — DISCHARGE SUMMARY
[facility]  Vascular Neurology  Comprehensive Stroke Center  Discharge Summary     Summary:     Admit Date: 6/25/2020  2:34 PM    Discharge Date and Time: 6/26/2020  2:01 PM    Attending Physician: Dr. Nichole MD     Discharge Provider: Neema Castro NP    History of Present Illness: 89 y/o female with history of Afib (warfarin), CHF, CKD III, CAD, HLD, HTN, prior TIA presents from the Optometry clinic for branch retinal vein occlusion.  Patient initially presented with complaints of sudden vision loss lasting about 1 minute.  Her vision returned but now she is seeing colors and flashing lights, along with swirling circles.  Patient reports associated eye pain, HA, and Jaw pain/fullness.  Patient had a prior history of BRVO in 2002.  Patient has significant eye disease history.      Of note, patient has been subtherapeutic on warfarin but reports taking her medication without missing doses.      Hospital Course (synopsis of major diagnoses, care, treatment, and services provided during the course of the hospital stay):       89 y/o female with history of Afib (warfarin), CHF, CKD III, CAD, HLD, HTN, prior TIA now with isolated BRAO vs GCA.  Patient with constellation of symptoms concerning for GCA on admission- jaw pain/fullness. MRI/MRA unremarkable.Sed rate and CRP unremarkable. Patient denies scalp tenderness or headache. Vision intact. TCD with normal temporal arteries. GSC unlikely, prednisone discontinued. 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 Patient to continue increased dose of Coumadin (5mg) and follow up in Coumadin clinic. Will continue pravastatin 40 mg, reports intolerance of atorvastatin. Work up complete, patient to discharge home today and follow up in stroke clinic in 4-6 weeks.       Stroke Etiology: Not Applicable/Not Ischemic Stroke    STROKE DOCUMENTATION        NIH Scale:  1a. Level of Consciousness: 0-->Alert, keenly responsive  1b. LOC Questions: 0-->Answers both questions  correctly  1c. LOC Commands: 0-->Performs both tasks correctly  2. Best Gaze: 0-->Normal  3. Visual: 0-->No visual loss  4. Facial Palsy: 0-->Normal symmetrical movements  5a. Motor Arm, Left: 0-->No drift, limb holds 90 (or 45) degrees for full 10 secs  5b. Motor Arm, Right: 0-->No drift, limb holds 90 (or 45) degrees for full 10 secs  6a. Motor Leg, Left: 0-->No drift, leg holds 30 degree position for full 5 secs  6b. Motor Leg, Right: 0-->No drift, leg holds 30 degree position for full 5 secs  7. Limb Ataxia: 0-->Absent  8. Sensory: 0-->Normal, no sensory loss  9. Best Language: 0-->No aphasia, normal  10. Dysarthria: 0-->Normal  11. Extinction and Inattention (formerly Neglect): 0-->No abnormality  Total (NIH Stroke Scale): 0            Modified Maddie Score: 0  [place] Coma Scale:    ABCD2 Score:    [id number] Score:   HAS -BLED Score:   ICH Score:   Hunt & Bosch Classification:       Assessment/Plan:     Diagnostic Results:    MRI brain 6/25/20     1. No acute intracranial abnormality.  2. Generalized cerebral volume loss and chronic microvascular ischemic change.  3. No significant arterial abnormalities.  4. Bilateral mastoid effusions.           Vessel Imaging      MRA brain/ neck 6/25/20  No significant arterial abnormalities     TCD 6/26/20  Normal temporal arteries.     No obvious findings to suggest Giant Cell Arteritis.     Cardiac Imaging     Interventions: None    Complications: None    Disposition: Home or Self Care    Final Active Diagnoses:    Diagnosis Date Noted POA    PRINCIPAL PROBLEM:  BRVO (branch retinal vein occlusion) [H34.8392] 06/25/2020 Yes    Chronic atrial fibrillation [I48.20] 01/29/2016 Yes    Hypertension [I10]  Unknown     Chronic    Pure hypercholesterolemia [E78.00] 07/02/2012 Yes      Problems Resolved During this Admission:    Diagnosis Date Noted Date Resolved POA    Balance problems [R26.89] 11/09/2012 06/26/2020 Yes     * BRVO (branch retinal vein occlusion)  89 y/o  female with history of Afib (warfarin), CHF, CKD III, CAD, HLD, HTN, prior TIA now with isolated BRAO vs GCA.  Patient with constellation of symptoms concerning for GCA on admission- jaw pain/fullness. MRI/MRA unremarkable.Sed rate and CRP unremarkable. Patient denies scalp tenderness or headache. Vision intact. TCD with normal temporal arteries. GSC unlikely, prednisone discontinued. 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 Patient to continue increased dose of Coumadin (5mg) and follow up in Coumadin clinic. Will continue pravastatin 40 mg, reports intolerance of atorvastatin. Work up complete, patient to discharge home today and follow up in stroke clinic in 4-6 weeks.    Plan:  -Antithrombotic: Continue home warfarin increased dose to 5mg (INR 1.7)  -Statin:  Pravastatin 40 mg   -BP goal = normotension    Chronic atrial fibrillation  Has been taking warfarin without missing doses  Subtherapeutic 1.6 today  Warfarin 5 mg daily  Daily INR    Hypertension  Stroke Risk Factor  Continue home meds      Pure hypercholesterolemia  Stroke Risk Factor  Reporting muscle aches with atorvastatin   Continue home pravastatin 40 mg         Recommendations:     Post-discharge complication risks: None    Stroke Education given to: patient    Follow-up in Stroke Clinic in 4-6 weeks    Discharge Plan:  Anticoagulant: Warfarin    Follow Up:  Follow-up Information     Himanshu Queen DO On 7/7/2020.    Specialty: Internal Medicine  Why: hospital discharge follow up 10 am (Dr. Young on maternity leave)  Contact information:  [address] [facility] [address]  [phone]             Jayashree Montelongo OD In 1 week.    Specialty: Optometry  Why: BRVO hospital discharge follow up  Contact information:  [facility]  [facility] [address]  [phone]             [facility] VASCULAR NEUROLOGY In 4 weeks.    Specialty: Vascular Neurology  Why: clinic to call to set up appointment, if no call within 1 week please call  [phone]  Contact information:  [facility]  [facility] [address]  [phone]           [facility] - Coumadin In 1 week.    Specialty: Cardiology  Why: coumadin will call to schedule follow up  Contact information:  [address]  [facility] [address] [phone]  Additional information:  3rd floor                 Patient Instructions:      Ambulatory referral/consult to Outpatient Case Management   Referral Priority: Routine Referral Type: Consultation   Referral Reason: Specialty Services Required   Number of Visits Requested: 1     Activity as tolerated       Medications:  Reconciled Home Medications:      Medication List      CHANGE how you take these medications    pravastatin 40 MG tablet  Commonly known as: PRAVACHOL  Take 1 tablet (40 mg total) by mouth 2 (two) times daily.  What changed: when to take this     warfarin 5 MG tablet  Commonly known as: COUMADIN  Take 1 tablet (5 mg total) by mouth Daily.  What changed:   · medication strength  · how much to take  · how to take this  · when to take this  · additional instructions        CONTINUE taking these medications    diclofenac sodium 1 % Gel  Commonly known as: VOLTAREN  Apply 2 g topically 3 (three) times daily. Apply to ankle for pain     fluticasone propionate 50 mcg/actuation nasal spray  Commonly known as: FLONASE  USE 1 SPRAY IN EACH NOSTRIL ONE TIME DAILY     furosemide 20 MG tablet  Commonly known as: LASIX  TAKE 1 TABLET BY MOUTH DAILY; MAY TAKE AN ADDITIONAL TABLET AS NEEDED FOR SWELLING.     mupirocin 2 % ointment  Commonly known as: BACTROBAN  Apply to affected area 3 times daily     ONE DAILY MULTIVITAMIN per tablet  Generic drug: multivitamin  Take 1 tablet by mouth once daily.     potassium chloride 10 MEQ Cpsr  Commonly known as: MICRO-K  TAKE 1 CAPSULE EVERY DAY     SYSTANE (PROPYLENE GLYCOL) 0.4-0.3 % Drop  Generic drug: peg 400-propylene glycol  Place 1-2 drops into both eyes as needed.      triamcinolone 55 mcg nasal inhaler  Commonly known as: NASACORT  2 sprays by Nasal route once daily.     VITAMIN D3 ORAL  Take 1 tablet by mouth once daily.            Neema Castro NP  [facility]  Department of Vascular Neurology   [facility]

## 2020-06-27 NOTE — ASSESSMENT & PLAN NOTE
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  Patient with constellation of symptoms concerning for GCA on admission- jaw pain/fullness. MRI/MRA unremarkable.Sed rate and CRP unremarkable. Patient denies scalp tenderness or headache. Vision intact. TCD with normal temporal arteries. GSC unlikely, prednisone discontinued. 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 Patient to continue increased dose of Coumadin (5mg) and follow up in Coumadin clinic. Will continue pravastatin 40 mg, reports intolerance of atorvastatin. Work up complete, patient to discharge home today and follow up in stroke clinic in 4-6 weeks.    Plan:  -Antithrombotic: Continue home warfarin increased dose to 5mg (INR 1.7)  -Statin:  Pravastatin 40 mg   -BP goal = normotension

## 2020-06-27 NOTE — NURSING
Patient is a pleasant 89yo female. She is hard of hearing and has no batteries for them. She wears glasses only for reading. She has no c/o pain. Patient has a personal walker in her room. She is able to ambulate well with minimum assistance. Patient walks around her neighborhood and rides a stationary bike at home to stay active. Discharge instructions were reviewed . All questions asked and answered. Tele and IV removed without incidence. This nurse brought pt and belongings to meet transportation. Patient left in no apparent distress.

## 2020-06-27 NOTE — ASSESSMENT & PLAN NOTE
Has been taking warfarin without missing doses  Subtherapeutic 1.6 today  Warfarin 5 mg daily  Daily INR   negative

## 2020-06-29 ENCOUNTER — LAB VISIT (OUTPATIENT)
Dept: LAB | Facility: HOSPITAL | Age: 85
End: 2020-06-29
Attending: INTERNAL MEDICINE
Payer: MEDICARE

## 2020-06-29 ENCOUNTER — ANTI-COAG VISIT (OUTPATIENT)
Dept: CARDIOLOGY | Facility: CLINIC | Age: 85
End: 2020-06-29
Payer: MEDICARE

## 2020-06-29 DIAGNOSIS — Z79.01 CURRENT USE OF LONG TERM ANTICOAGULATION: ICD-10-CM

## 2020-06-29 DIAGNOSIS — I48.20 CHRONIC ATRIAL FIBRILLATION: ICD-10-CM

## 2020-06-29 LAB
INR PPP: 1.7 (ref 0.8–1.2)
PROTHROMBIN TIME: 16.4 SEC (ref 9–12.5)

## 2020-06-29 PROCEDURE — 36415 COLL VENOUS BLD VENIPUNCTURE: CPT | Mod: HCNC,PO

## 2020-06-29 PROCEDURE — 85610 PROTHROMBIN TIME: CPT | Mod: HCNC

## 2020-06-29 PROCEDURE — 93793 ANTICOAG MGMT PT WARFARIN: CPT | Mod: S$GLB,,,

## 2020-06-29 PROCEDURE — 93793 PR ANTICOAGULANT MGMT FOR PT TAKING WARFARIN: ICD-10-PCS | Mod: S$GLB,,,

## 2020-07-01 ENCOUNTER — TELEPHONE (OUTPATIENT)
Dept: NEUROLOGY | Facility: CLINIC | Age: 85
End: 2020-07-01

## 2020-07-01 NOTE — PATIENT INSTRUCTIONS
Heart Failure: Warning Signs of a Flare-Up  You have a condition called heart failure. Once you have heart failure, flare-ups can happen. Below are signs that can mean your heart failure is getting worse. If you notice any of these warning signs, call your healthcare provider.  Swelling    · Your feet, ankles, or lower legs get puffier.  · You notice skin changes on your lower legs.  · Your shoes feel too tight.  · Your clothes are tighter in the waist.  · You have trouble getting rings on or off your fingers.  Shortness of breath  · You have to breathe harder even when youre doing your normal activities or when youre resting.  · You are short of breath walking up stairs or even short distances.  · You wake up at night short of breath or coughing.  · You need to use more pillows or sit up to sleep.  · You wake up tired or restless.  Other warning signs  · You feel weaker, dizzy, or more tired.  · You have chest pain or changes in your heartbeat.  · You have a cough that wont go away.  · You cant remember things or dont feel like eating.  Tracking your weight  Gaining weight is often the first warning sign that heart failure is getting worse. Gaining even a few pounds can be a sign that your body is retaining excess water and salt. Weighing yourself each day in the morning after you urinate and before you eat, is the best way to know if you're retaining water. Get a scale that is easy to read and make sure you wear the same clothes and use the same scale every time you weigh. Your healthcare provider will show you how to track your weight. Call your doctor if you gain more than 2 pounds in 1 day, 5 pounds in 1 week, or whatever weight gain you were told to report by your doctor. This is often a sign of worsening heart failure and needs to be evaluated and treated before it compromises your breathing. Your doctor will tell you what to do next.    Date Last Reviewed: 3/15/2016  © 6982-3921 [employer] [address] [place] [address]. All rights reserved. This information is not intended as a substitute for professional medical care. Always follow your healthcare professional's instructions.        Left- or Right- Side Congestive Heart Failure (CHF)    The heart is a large muscle. It is a pump that circulates blood throughout the body. Blood carries oxygen to all of the organs, including the brain, muscles, and skin. After your body takes the oxygen out of the blood, the blood returns to the heart. The right side of the heart collects the blood from the body and pumps it to the lungs. In the lungs, it gets fresh oxygen and gives up carbon dioxide. The oxygen-rich blood from the lungs then returns to the left side of the heart, where it is pumped back out to the rest of your body, starting the process all over.  Congestive heart failure (CHF) occurs when the heart muscle is weakened. This affects the pumping action of the heart. Heart failure can affect the right side of the heart or the left side. But heart failure may affect not only the right side of the heart or only the left side. Although it may have started on one side, it can and often eventually does affect both sides.  Right-side heart failure  When the right side of the heart is weakened, it cant handle the blood it is getting from the rest of the body. This blood returns to the heart through veins. When too much pressure builds up in the veins, fluid leaks out into the tissues. Gravity then causes that fluid to move to those parts of the body that are the lowest. So one of the first symptoms of right-side CHF can include swelling in the feet and ankles. If the condition gets worse, the swelling can even go up past the knees. Sometimes it gets so severe, the liver can get congested as well.  Left-side heart failure  When the left side of the heart is weakened, it cant handle the blood it gets from the lungs. Pressure then builds up in  the veins of the lungs, causing fluid to leak into the lung tissues. This may cause CHF and pulmonary edema. This causes you to feel short of breath, weak, or dizzy. These symptoms are often worse with exertion, such as when climbing stairs or walking up hills. Lying with your head flat is uncomfortable and can make your breathing worse. This may make sleeping difficult. You may need to use extra pillows to elevate your upper body to sleep well. The same is true when just resting during the daytime.  There are many causes of heart failure including:  · Coronary artery disease  · Past heart attack (also known as acute myocardial infarction, or AMI)  · High blood pressure  · Damaged heart valve  · Diabetes  · Obesity  · Cigarette smoking  · Alcohol abuse  Heart failure is a chronic condition. There is no cure. The purpose of medical treatment is to improve the pumping action of the heart. The main way to do this is to remove excess water from the body. A number of medicines can help reach this goal, improve symptoms, and prevent the heart from becoming weaker. Sometimes, heart failure can become so severe that a device is placed in the heart to help with pumping. Another major goal is to better treat the causes of heart failure, such as diabetes and high blood pressure, by making changes in your lifestyle and maximizing medical control when needed.  Home care  Follow these guidelines when caring for yourself at home:  · Check your weight every day. This is very important because a sudden increase in weight gain could mean worsening heart failure. Keep these things in mind:  ¨ Use the same scale every day.  ¨ Weigh yourself at the same time every day.  ¨ Make sure the scale is on a hard floor surface, not on a rug or carpet.  ¨ Keep a record of your weight every day so your healthcare provider can see it. If you are not given a log sheet for this, keep a separate journal for this purpose.   · Cut back on the amount of  salt (sodium) you eat. Follow your healthcare provider's recommendation on how much salt or sodium you should have each day.  ¨ Avoid high-salt foods. These include olives, pickles, smoked meats, salted potato chips, and most prepared foods.  ¨ Don't add salt to your food at the table. Use only small amounts of salt when cooking.  ¨ Read the labels carefully on food packages to learn how much salt or sodium is in each serving in the package. Remember, a can or package of food may contain more than 1 serving. So if you eat all the food in the package, you may be getting more salt than you think.  · Follow your healthcare provider's recommendations about how much fluid you should have. Be aware that some foods, such as soup, pudding, and juicy fruits like oranges or melons, contain liquid. You'll need to count the liquid in those foods as part of your daily fluid intake. Your provider can help you with this.  · Stop smoking.  · Cut back on how much alcohol you drink.  · Lose weight if you are overweight. The excess weight adds a lot of stress on the workload of the heart.  · Stay active. Talk with your provider about an exercise program that is safe for your heart.  · Keep your feet elevated to reduce swelling. Ask your provider about support hose as a preventive treatment for daytime leg swelling.  Besides taking your medicine as instructed, an important part of treatment is lifestyle changes. These include diet, physical activity, stopping smoking, and weight control.  Improve your diet by including more fresh foods, cutting back on how much sugar and saturated fat you eat, and eating fewer processed foods and less salt.  Follow-up care  Follow up with your healthcare provider, or as advised.  Make sure to keep any appointments that were made for you. These can help better control your congestive heart failure. You will need to follow up with your provider on a routine basis to make sure your heart failure is well  managed.  If an X-ray, ECG, or other tests were done, you will be told of any new findings that may affect your care.  Call 911  Call 911 if you:  · Become severely short of breath  · Feel lightheaded, or feel like you might pass out or faint  · Have chest pain or discomfort that is different than usual, the medicines your doctor told you to use for this don't help, or the pain lasts longer than 10 to 15 minutes  · You suddenly develop a rapid heart rate  When to seek medical advice  The following may be signs that your heart failure is getting worse. Call your healthcare provider right away if any of these happen:  · Sudden weight gain. This means 3 or more pounds in one day, or 5 or more pounds in 1 week.  · Trouble breathing not related to being active  · New or increased swelling of your legs or ankles  · Swelling or pain in your abdomen  · Breathing trouble at night. This means waking up short of breath or needing more pillows to breathe.  · Frequent coughing that doesnt go away  · Feeling much more tired than usual  Date Last Reviewed: 1/4/2016  © 5026-9933 [employer]. [address] [place] [address]. All rights reserved. This information is not intended as a substitute for professional medical care. Always follow your healthcare professional's instructions.        Eating Heart-Healthy Food: Using the DASH Plan    Eating for your heart doesnt have to be hard or boring. You just need to know how to make healthier choices. The DASH eating plan has been developed to help you do just that. DASH stands for Dietary Approaches to Stop Hypertension. It is a plan that has been proven to be healthier for your heart and to lower your risk for high blood pressure. It can also help lower your risk for cancer, heart disease, osteoporosis, and diabetes.  Choosing from each food group  Choose foods from each of the food groups below each day. Try to get the recommended number of servings for each  food group. The serving numbers are based on a diet of 2,000 calories a day. Talk to your doctor if youre unsure about your calorie needs. Along with getting the correct servings, the DASH plan also recommends a sodium intake less than 2,300 mg per day.        Grains  Servings: 6 to 8 a day  A serving is:  · 1 slice bread  · 1 ounce dry cereal  · Half a cup cooked rice, pasta or cereal  Best choices: Whole grains and any grains high in fiber. Vegetables  Servings: 4 to 5 a day  A serving is:  · 1 cup raw leafy vegetable  · Half a cup cut-up raw or cooked vegetable  · Half a cup vegetable juice  Best choices: Fresh or frozen vegetables prepared without added salt or fat.   Fruits  Servings: 4 to 5 a day  A serving is:  · 1 medium fruit  · One-quarter cup dried fruit  · Half a cup fresh, frozen, or canned fruit  · Half a cup of 100% fruit juices  Best choices: A variety of fresh fruits of different colors. Whole fruits are a better choice than fruit juices. Low-fat or fat-free dairy  Servings: 2 to 3 a day  A serving is:  · 1 cup milk  · 1 cup yogurt  · One and a half ounces cheese  Best choices: Skim or 1% milk, low-fat or fat-free yogurt or buttermilk, and low-fat cheeses.         Lean meats, poultry, fish  Servings: 6 or fewer a day  A serving is:  · 1 ounce cooked meats, poultry, or fish  · 1 egg  Best choices: Lean poultry and fish. Trim away visible fat. Broil, grill, roast, or boil instead of frying. Remove skin from poultry before eating. Limit how much red meat you eat.  Nuts, seeds, beans  Servings: 4 to 5 a week  A serving is:  · One-third cup nuts (one and a half ounces)  · 2 tablespoons nut butter or seeds  · Half a cup cooked dry beans or legumes  Best choices: Dry roasted nuts with no salt added, lentils, kidney beans, garbanzo beans, and whole taylor beans.   Fats and oils  Servings: 2 to 3 a day  A serving is:  · 1 teaspoon vegetable oil  · 1 teaspoon soft margarine  · 1 tablespoon mayonnaise  · 2  tablespoons salad dressing  Best choices: Nut and vegetable oils (nontropical vegetable oils), such as olive and canola oil. Sweets  Servings: 5 a week or fewer  A serving is:  · 1 tablespoon sugar, maple syrup, or honey  · 1 tablespoon jam or jelly  · 1 half-ounce jelly beans (about 15)  · 1 cup lemonade  Best choices: Dried fruit can be a satisfying sweet. Choose low-fat sweets. And watch your serving sizes!      For more on the DASH eating plan, visit:  www.nhlbi.nih.gov/health/health-topics/topics/dash   Date Last Reviewed: 6/1/2016  © 0388-3153 [employer]. [address] [place] [address], [place] [address]. All rights reserved. This information is not intended as a substitute for professional medical care. Always follow your healthcare professional's instructions.        Low-Salt Choices  Eating salt (sodium) can make your body retain too much water. Excess water makes your heart work harder. Canned, packaged, and frozen foods are easy to prepare, but they are often high in sodium. Here are some ideas for low-salt foods you can easily prepare yourself.    For breakfast  · Fruit or 100% fruit juice  · Whole-wheat bread or an English muffin. Compare sodium content on labels.  · Low-fat milk or yogurt  · Unsalted eggs  · Shredded wheat  · Corn tortillas  · Unsalted steamed rice  · Regular (not instant) hot cereal, made without salt  Stay away from:  · Sausage, foley, and ham  · Flour tortillas  · Packaged muffins, pancakes, and biscuits  · Instant hot cereals  · Cottage cheese  For lunch and dinner  · Fresh fish, chicken, turkey, or meat--baked, broiled, or roasted without salt  · Dry beans, cooked without salt  · Tofu, stir-fried without salt  · Unsalted fresh fruit and vegetables, or frozen or canned fruit and vegetables with no added salt  Stay away from:  · Lunch or deli meat that is cured or smoked  · Cheese  · Tomato juice and catsup  · Canned vegetables, soups, and fish not labeled as no-salt-added or  reduced sodium  · Packaged gravies and sauces  · Olives, pickles, and relish  · Bottled salad dressings  For snacks and desserts  · Yogurt  · Unsalted, air popped popcorn  · Unsalted nuts or seeds  Stay away from:  · Pies and cakes  · Packaged dessert mixes  · Pizza  · Canned and packaged puddings  · Pretzels, chips, crackers, and nuts--unless the label says unsalted  Date Last Reviewed: 6/17/2015  © 8557-7339 [employer]. [address] [place] [address]. All rights reserved. This information is not intended as a substitute for professional medical care. Always follow your healthcare professional's instructions.

## 2020-07-01 NOTE — TELEPHONE ENCOUNTER
----- Message from Perico Spencer sent at 7/1/2020 11:57 AM CDT -----  Contact: pt  Please call pt at [phone]    Patient is requesting a sooner hospital discharge appt     Patient is still having vision problems    Thank you

## 2020-07-01 NOTE — PROGRESS NOTES
"Outpatient Care Management  Initial Patient Assessment    Patient: Jenniffer Jimenez  MRN: [id number]  Date of Service: 06/26/2020  Completed by: Brisa Al RN  Referral Date: 06/26/2020  Program: Case Management (High Risk)    Reason for Visit   Patient presents with    OPCM Chart Review     6/26/20    OPCM RN First Assessment Attempt     6/29/20    OPCM Enrollment Call     6/30/20    Initial Assessment     6/30/20    OPCM Welcome Letter     6/30/20    PHQ-9     6/30/20    Plan Of Care     6/30/20       Brief Summary: Contacted patient to complete initial assessment for OPCM. Patient is agreeable to OPCM program. Patient lives alone.  She has 2 adults daughters, one lives in [place] and the other lives in [place]. She does have a granddaughter that lives nearby that does assist her if needed. Patient uses a RW or cane when leaving the house. Patient has been trying to stay home as much as possible with the COVID-19. She has been ordering her medications and groceries and having them delivered. She no longer drives. She usually uses [facility] to take her to MD appts. She states that she gets frustrated with [employer] because they do not drive her to the door of the MD office, they drop her off in the garage and it is quite a walking distance for her to get to the MD office. She has requested for them to drop her off closer but they do not. She is requesting any other available transportation resources, will refer patient to [facility] for additional resources.  She reports financial struggles with food at times. Prior to COVID-19, patient was attending a senior center and they have assisted her with getting Meals on Wheels temporarily. She is not sure how much longer she will receive these meals so she is asking for any additional food resources available. She states that she already has paperwork for advanced planning, living will and POA which would be her granddaughter. She states "I just haven't filed it yet". " Advised patient to bring a copy to be scanned once paperwork has been completed. She checks her weight and BP daily but does not keep a record of results. Educated patient on signs/symptoms of CHF flare. Will mail additional education on CHF Home Care guide, CHF flare, low sodium diet/choices and logs to start keeping record of results for MD appts.     Reviewed [employer] benefits with patient. Patient already uses the OTC and mail pharmacy benefits. Patient would like information on getting transportation benefit added to her policy. Provided patient with [facility] representative phone number to contact for more information.       Reminded patient of upcoming appointments, verbalized awareness. Discussed completing follow up call with patient, who is in agreement with call in 2 weeks.      Assessment Documentation     OPCM Initial Assessment    Involvement of Care  Do I have permission to speak with other family members about your care?: No  Assessment completed by: Patient  Patient Reported Insurance  Verified current insurance plan: [employer] Advantage  [employer] benefits discussed: OTC prescription discounts, Mail Order Pharmacy, Well Dine, Transportation, Silver Sneakers (Comment: pt already uses OTC and Mail Pharmacy)  Current Health Status  Patient Health Rating  Compared to other people your age, how would you rate your health?: Good  Patient Reported Labs & Vitals  Any patient reported labs and/or vitals?: Yes  Patient reported blood pressure (mmHg): 120/70  Patient reported weight (lbs): 164.7  Social Determinants  Advanced Care Planning  Do you have any of the following?:  (Comment: patient states that she has paperwork on POA and living will but has not completed yet)  If yes, do we have a copy?: N/A  If no, would the patient like Advance Directive resources?: No  Advanced Care Planning resources provided?: No (Comment: patient states that she has paperwork on POA and living will but has not completed  yet)  Is Advanced Care Planning an area of need?: No  Support  Caregiver presence?: Yes  Name of primary caregiver: ANA DURAND (GRANDDAUGHTER)  Primary caregiver phone number: [phone]  Primary caregiver relationship: family member  Present activity level: need help from person or special equipment to leave house (Comment: pt uses a RW or cane when leaving the house)  Who takes you to your medical appointments?: other (see comment) (Comment: patient uses "[employer]" transportation service)  Is the caregiver supporting a need?: Yes  Does the current primary caregiver meet the patient's needs?: Yes  Housing  Living arrangements: alone  Number of people in home: 1  Type of residence: single family home  Own or rent?: own  Permanent residence?: yes  Does the patient's residence have any of the following?: n/a (Comment: has a ramp built on to home)  Is housing an area of need?: no  Access to [employer]  Does the patient have access to any of the following devices or technologies?: SmartPhone  Clinical Assessment  Medication Adherence  How does the patient obtain their medications?: mail order, pharmacy delivery  How many days a week do you miss medications?: never  Do you use a pill box or medication chart to help you manage your medications?: pill box  Do you sometimes have difficulty refilling your medications?: no  Medication reconciliation completed?: Yes  Is medication adherence an area of need?: No  *Active medication list was reviewed and reconciled with patient and/or caregiver:   Nutritional Status  Diet: low sodium  Change in appetite?: no  Recent changes in weight?: weight loss > 10 lbs in the past 2 months  Was weight change intentional or unintentional?: intentional  Dentition: N/A  Is nutrition an area of need?: no  Labs  Do you have regular lab work to monitor your medications?: yes  Type of lab work: INR  Where do you get your lab work done?: [facility]  Is lab adherence an area of need?:  no  PHQ Depression Screen  Does patient's PHQ Depression Screening indicate a barrier to meeting self-care needs?: No  Cognitive/Behavioral Health  Alert and oriented?: yes  Difficulty thinking?: no  Requires prompting?: no  Requires assistance for routine expression?: no  Is Cognitive/Behavioral health an area of need?: no  Culture/Episcopal  Does patient have cultural or Jainism beliefs that may impact ability to access healthcare?: no  Communication  Language preference: English   needed?: no  Hearing problems?: yes  Hearing assistance: hearing aid  Decreased vision?: yes  Legally blind?: no  Vision assistance: glasses  Is Communication an area of need?: no  Health Literacy  Preferred learning method: reading materials  How often do you need to have someone help you read instructions, pamphlets, or other written material from your doctor or pharmacy?: never  Is there a Health Literacy need?: no  Activities of Daily Living  Ambulation: assistance required (Comment: uses RW or cane when leaving the house)  Dressing: independent  Bathing: independent  Transfers: independent  Toileting: independent  Feeding: independent  Cleaning home/chores: independent  Telephone use: independent  Shopping/attending doctors' appointments: independent  Paying bills: independent  Taking meds: independent  Climbing stairs: assistance required  Fall Risk  Patient mobility status: Ambulatory w/ assistance  Number of falls in the past 12 months: 0  Fall risk?: No  Equipment/Current Services  Equipment/supplies used in home: cane, walker, other (see comment) (Comment: BP machine)  Current services: transportation service, [place] Me[employer] on Wheels  Is Equipment/Supplies/Services an area of need?: no  Community & Government Programs  Support: senior centers (Comment: no longer attending senior center at this time due to COVID-19)  Government suppot assistance: N/A  [facility] Office of Aging and Adult Services: N/A  Community Resource  Assessment  Based on the assessment of needs: Patient is in need of community resources  OPCM  consulted to assist with the following: transportation, nutrition support  Completion of Initial Assessment  Is the Initial Assessment Complete at this time?: yes         Problem List and History     Patient Active Problem List   Diagnosis    Pure hypercholesterolemia    Pseudophakia, both eyes    Stable central retinal vein occlusion of left eye    Chronic rhinitis    Hearing loss, sensorineural    Hypertension    Arthritis    Exotropia of both eyes    Gait instability    Meniere's disease    Facet arthropathy, lumbosacral    Lumbar spinal stenosis    Chronic diastolic heart failure    Hypermetropia of right eye    Chronic atrial fibrillation    Chronic kidney disease, stage III (moderate)    Atherosclerosis of aorta    Left ventricular diastolic dysfunction with preserved systolic function    History of TIA (transient ischemic attack)    Osteoporosis    Primary osteoarthritis of right shoulder    History of strabismus surgery    Current use of long term anticoagulation    Prediabetes    Mass on back    Refractive error    Posterior vitreous detachment, bilateral    Dry eye syndrome    Overweight (BMI 25.0-29.9)    Risk for falls    OAB (overactive bladder)    Recurrent UTI    Venous insufficiency of both lower extremities    BRVO (branch retinal vein occlusion)       Reviewed Active Problem List with patient and/or Caregiver. The following were identified as areas of need: CHF and hypertension    Medical History:  Reviewed medical history with patient and/or caregiver    Social History:  Reviewed social history with patient and/or caregiver    Complex Care Plan    Care plan was discussed and completed today with input from patient and/or caregiver.    Goals    None         Patient Instructions     Instructions were provided via the [employer] patient resources and are available  for the patient to view on the patient portal, if active.       Clinical Reference Documents Added to Patient Instructions       Document    HEART FAILURE, CONGESTIVE (CHF) (ENGLISH)    HEART FAILURE: WARNING SIGNS OF A FLARE-UP (ENGLISH)    HEART-HEALTHY FOOD, EATING: USING THE DASH PLAN (ENGLISH)    LOW-SALT CHOICES (ENGLISH)            Follow up in about 18 days (around 7/14/2020) for OPCM.    Todays OPCM Self-Management Care Plan was developed with the patients/caregivers input and was based on identified barriers from todays assessment.  Goals were written today with the patient/caregiver and the patient has agreed to work towards these goals to improve his/her overall well-being. Patient verbalized understanding of the care plan, goals, and all of today's instructions. Encouraged patient/caregiver to communicate with his/her physician and health care team about health conditions and the treatment plan.  Provided my contact information today and encouraged patient/caregiver to call me with any questions as needed.

## 2020-07-07 ENCOUNTER — ANTI-COAG VISIT (OUTPATIENT)
Dept: CARDIOLOGY | Facility: CLINIC | Age: 85
End: 2020-07-07
Payer: MEDICARE

## 2020-07-07 ENCOUNTER — HOSPITAL ENCOUNTER (OUTPATIENT)
Dept: RADIOLOGY | Facility: HOSPITAL | Age: 85
Discharge: HOME | End: 2020-07-07
Attending: INTERNAL MEDICINE
Payer: MEDICARE

## 2020-07-07 ENCOUNTER — OFFICE VISIT (OUTPATIENT)
Dept: OPHTHALMOLOGY | Facility: CLINIC | Age: 85
End: 2020-07-07
Payer: MEDICARE

## 2020-07-07 ENCOUNTER — INITIAL CONSULT (OUTPATIENT)
Dept: CARDIOLOGY | Facility: CLINIC | Age: 85
End: 2020-07-07
Payer: MEDICARE

## 2020-07-07 VITALS
OXYGEN SATURATION: 100 % | SYSTOLIC BLOOD PRESSURE: 172 MMHG | DIASTOLIC BLOOD PRESSURE: 84 MMHG | BODY MASS INDEX: 27.25 KG/M2 | HEIGHT: 66 IN | WEIGHT: 169.56 LBS | HEART RATE: 69 BPM

## 2020-07-07 DIAGNOSIS — I70.0 ATHEROSCLEROSIS OF AORTA: ICD-10-CM

## 2020-07-07 DIAGNOSIS — I48.0 PAROXYSMAL ATRIAL FIBRILLATION: Primary | ICD-10-CM

## 2020-07-07 DIAGNOSIS — H34.8320 BRANCH RETINAL VEIN OCCLUSION OF LEFT EYE WITH MACULAR EDEMA: Primary | ICD-10-CM

## 2020-07-07 DIAGNOSIS — I48.20 CHRONIC ATRIAL FIBRILLATION: Primary | ICD-10-CM

## 2020-07-07 DIAGNOSIS — Q20.8 ABNORMALITY OF LEFT ATRIAL APPENDAGE: ICD-10-CM

## 2020-07-07 DIAGNOSIS — I48.91 ATRIAL FIBRILLATION: ICD-10-CM

## 2020-07-07 DIAGNOSIS — I48.20 CHRONIC ATRIAL FIBRILLATION: ICD-10-CM

## 2020-07-07 DIAGNOSIS — I10 ESSENTIAL HYPERTENSION: Chronic | ICD-10-CM

## 2020-07-07 DIAGNOSIS — Z79.01 CURRENT USE OF LONG TERM ANTICOAGULATION: ICD-10-CM

## 2020-07-07 DIAGNOSIS — H43.813 POSTERIOR VITREOUS DETACHMENT, BILATERAL: ICD-10-CM

## 2020-07-07 DIAGNOSIS — E78.00 PURE HYPERCHOLESTEROLEMIA: ICD-10-CM

## 2020-07-07 PROCEDURE — 99999 PR PBB SHADOW E&M-EST. PATIENT-LVL IV: CPT | Mod: PBBFAC,HCNC,, | Performed by: OPHTHALMOLOGY

## 2020-07-07 PROCEDURE — 1126F PR PAIN SEVERITY QUANTIFIED, NO PAIN PRESENT: ICD-10-PCS | Mod: HCNC,S$GLB,, | Performed by: INTERNAL MEDICINE

## 2020-07-07 PROCEDURE — 92014 COMPRE OPH EXAM EST PT 1/>: CPT | Mod: 25,HCNC,S$GLB, | Performed by: OPHTHALMOLOGY

## 2020-07-07 PROCEDURE — 99499 RISK ADDL DX/OHS AUDIT: ICD-10-PCS | Mod: HCNC,S$GLB,, | Performed by: INTERNAL MEDICINE

## 2020-07-07 PROCEDURE — 92014 PR EYE EXAM, EST PATIENT,COMPREHESV: ICD-10-PCS | Mod: 25,HCNC,S$GLB, | Performed by: OPHTHALMOLOGY

## 2020-07-07 PROCEDURE — 92134 POSTERIOR SEGMENT OCT RETINA (OCULAR COHERENCE TOMOGRAPHY)-BOTH EYES: ICD-10-PCS | Mod: HCNC,S$GLB,, | Performed by: OPHTHALMOLOGY

## 2020-07-07 PROCEDURE — 99499 UNLISTED E&M SERVICE: CPT | Mod: HCNC,S$GLB,, | Performed by: INTERNAL MEDICINE

## 2020-07-07 PROCEDURE — 93793 PR ANTICOAGULANT MGMT FOR PT TAKING WARFARIN: ICD-10-PCS | Mod: S$GLB,,,

## 2020-07-07 PROCEDURE — 1101F PR PT FALLS ASSESS DOC 0-1 FALLS W/OUT INJ PAST YR: ICD-10-PCS | Mod: HCNC,CPTII,S$GLB, | Performed by: INTERNAL MEDICINE

## 2020-07-07 PROCEDURE — 1159F MED LIST DOCD IN RCRD: CPT | Mod: HCNC,S$GLB,, | Performed by: INTERNAL MEDICINE

## 2020-07-07 PROCEDURE — 99999 PR PBB SHADOW E&M-EST. PATIENT-LVL IV: ICD-10-PCS | Mod: PBBFAC,HCNC,, | Performed by: OPHTHALMOLOGY

## 2020-07-07 PROCEDURE — 99205 PR OFFICE/OUTPT VISIT, NEW, LEVL V, 60-74 MIN: ICD-10-PCS | Mod: HCNC,S$GLB,, | Performed by: INTERNAL MEDICINE

## 2020-07-07 PROCEDURE — 1159F PR MEDICATION LIST DOCUMENTED IN MEDICAL RECORD: ICD-10-PCS | Mod: HCNC,S$GLB,, | Performed by: INTERNAL MEDICINE

## 2020-07-07 PROCEDURE — 99205 OFFICE O/P NEW HI 60 MIN: CPT | Mod: HCNC,S$GLB,, | Performed by: INTERNAL MEDICINE

## 2020-07-07 PROCEDURE — 67028 INJECTION EYE DRUG: CPT | Mod: HCNC,LT,S$GLB, | Performed by: OPHTHALMOLOGY

## 2020-07-07 PROCEDURE — 67028 PR INJECT INTRAVITREAL PHARMCOLOGIC: ICD-10-PCS | Mod: HCNC,LT,S$GLB, | Performed by: OPHTHALMOLOGY

## 2020-07-07 PROCEDURE — 93793 ANTICOAG MGMT PT WARFARIN: CPT | Mod: S$GLB,,,

## 2020-07-07 PROCEDURE — 75574 CT ANGIO HRT W/3D IMAGE: CPT | Mod: TC,HCNC

## 2020-07-07 PROCEDURE — 1101F PT FALLS ASSESS-DOCD LE1/YR: CPT | Mod: HCNC,CPTII,S$GLB, | Performed by: INTERNAL MEDICINE

## 2020-07-07 PROCEDURE — 75574 CTA CARDIAC: ICD-10-PCS | Mod: 26,HCNC,, | Performed by: RADIOLOGY

## 2020-07-07 PROCEDURE — 99499 RISK ADDL DX/OHS AUDIT: ICD-10-PCS | Mod: HCNC,S$GLB,, | Performed by: OPHTHALMOLOGY

## 2020-07-07 PROCEDURE — 99499 UNLISTED E&M SERVICE: CPT | Mod: HCNC,S$GLB,, | Performed by: OPHTHALMOLOGY

## 2020-07-07 PROCEDURE — 99999 PR PBB SHADOW E&M-EST. PATIENT-LVL IV: CPT | Mod: PBBFAC,HCNC,, | Performed by: INTERNAL MEDICINE

## 2020-07-07 PROCEDURE — 1126F AMNT PAIN NOTED NONE PRSNT: CPT | Mod: HCNC,S$GLB,, | Performed by: INTERNAL MEDICINE

## 2020-07-07 PROCEDURE — 99999 PR PBB SHADOW E&M-EST. PATIENT-LVL IV: ICD-10-PCS | Mod: PBBFAC,HCNC,, | Performed by: INTERNAL MEDICINE

## 2020-07-07 PROCEDURE — 75574 CT ANGIO HRT W/3D IMAGE: CPT | Mod: 26,HCNC,, | Performed by: RADIOLOGY

## 2020-07-07 PROCEDURE — 25500020 PHARM REV CODE 255: Mod: HCNC | Performed by: INTERNAL MEDICINE

## 2020-07-07 PROCEDURE — 92134 CPTRZ OPH DX IMG PST SGM RTA: CPT | Mod: HCNC,S$GLB,, | Performed by: OPHTHALMOLOGY

## 2020-07-07 RX ORDER — DEXTROSE MONOHYDRATE AND SODIUM CHLORIDE 5; .45 G/100ML; G/100ML
INJECTION, SOLUTION INTRAVENOUS CONTINUOUS
Status: CANCELLED | OUTPATIENT
Start: 2020-07-07

## 2020-07-07 RX ORDER — DIPHENHYDRAMINE HCL 25 MG
50 CAPSULE ORAL EVERY 6 HOURS
Status: CANCELLED | OUTPATIENT
Start: 2020-07-07 | End: 2020-07-08

## 2020-07-07 RX ORDER — ASPIRIN 81 MG/1
81 TABLET ORAL DAILY
Qty: 360 TABLET | Refills: 0 | Status: ON HOLD
Start: 2020-07-07 | End: 2022-10-10

## 2020-07-07 RX ADMIN — IOHEXOL 85 ML: 350 INJECTION, SOLUTION INTRAVENOUS at 09:07

## 2020-07-07 RX ADMIN — Medication 1.25 MG: at 03:07

## 2020-07-07 NOTE — H&P (VIEW-ONLY)
Interventional Cardiology Clinic Note  Reason for Visit: AFib/Watchman evaluation  Referring Physician: Dr. Saenz/Mary Smith    HPI:   Jenniffer Jimenez is a 88 y.o. female who presents for Atrial Fibrillation    She has a PMHx of chronic Afib w/ on warfarin, TIA in 1997 and 2005, HTN, HLD, HFpEF, CKD III, hearing loss requiring a hearing aid.    Patient was seen in cardiology clinic 6/16/2020, having balance issues with falls leading to LE wounds, hence referred to IC clinic for Watchman evaluation. Wounds are now recovered on her L leg. No recent falls, but INR's have been labile and occasionally subtherapeutic in recent months.    Denies chest pain, SOB, palpitations, PND, or orthopnea. Has occasional LE edema. Lives alone, is unsteady on her feet for a long time but can ambulate without assistance at home. Use a rolling walker when she leaves the house.    AFib chronic since 2016.  CHADS2-VASc = 7 (11.2% annual risk of stroke, 15.7% risk of stroke/TIA/systemic embolism)  HASBLED = 5.    ROS:    Constitution: Negative for fever, chills, weight loss or gain.   HENT: Negative for sore throat, rhinorrhea, or headache.  Eyes: Negative for blurred or double vision.   Cardiovascular: See above  Pulmonary: Negative for SOB   Gastrointestinal: Negative for abdominal pain, nausea, vomiting, or diarrhea.   : Negative for dysuria.   Neurological: Negative for focal weakness or sensory changes.  PMH:     Past Medical History:   Diagnosis Date    Amblyopia of left eye 4/10/2013    Anticoagulant long-term use     Coumadin - A-Fib    Anxiety     Arthritis     Facet arthropathy, Lumbosacral    Atherosclerosis of aorta     Atrial fibrillation     Auricular fibrillation     Central retinal vein occlusion of left eye     CHF (congestive heart failure)     Chronic kidney disease, stage 3     Coronary artery disease     Depression     Diastolic heart failure 8/20/2014    Exotropia of both eyes 2/6/2013     "recession RSR 5.0mm w/ adj; recession LR os 5.0 w/ adj; resect MR os  4.0mm    Hearing loss     History of resection of small bowel     Hyperlipidemia     Hypertension     Hypertensive retinopathy of both eyes     Hypoglycemia     Macular degeneration     Meniere's disease of both ears     OA (osteoarthritis) of shoulder     Right    Osteoporosis     Other and unspecified hyperlipidemia     Posterior vitreous detachment of both eyes     Rhinitis     TIA (transient ischemic attack)      Past Surgical History:   Procedure Laterality Date    APPENDECTOMY      CARDIAC CATHETERIZATION      CATARACT EXTRACTION W/  INTRAOCULAR LENS IMPLANT Bilateral      SECTION, CLASSIC      HYSTERECTOMY      INNER EAR SURGERY      JOINT REPLACEMENT      LEFT KNEE REPLACEMENT IN  -    OOPHORECTOMY      SINUS SURGERY      STRABISMUS SURGERY  13    RSR recession 5 mm, LLR recession 5 mm and LMR resection 4mm    STRABISMUS SURGERY  2014    recess LR OD 6mm    TONSILLECTOMY       Allergies:     Review of patient's allergies indicates:   Allergen Reactions    Bactrim [sulfamethoxazole-trimethoprim] Other (See Comments)     "Couldn't walk"    Opioids - morphine analogues Other (See Comments)     Bowel issues; bowel obstruction    Tizanidine Other (See Comments)     "Lips were numb,  Almost passed out."    Tramadol Hallucinations    Beta-blockers (beta-adrenergic blocking agts) Other (See Comments)     Can not go on beta blockers for long period of time - due to taking allergy injections    Phenergan [promethazine] Other (See Comments)     Per pt. request- saw sisters have bad reaction to drug     Medications:     Current Outpatient Medications on File Prior to Visit   Medication Sig Dispense Refill    cholecalciferol, vitamin D3, (VITAMIN D3 ORAL) Take 1 tablet by mouth once daily.      diclofenac sodium (VOLTAREN) 1 % Gel Apply 2 g topically 3 (three) times daily. Apply to ankle for pain " 1 Tube 0    fluticasone propionate (FLONASE) 50 mcg/actuation nasal spray USE 1 SPRAY IN EACH NOSTRIL ONE TIME DAILY 32 g 11    furosemide (LASIX) 20 MG tablet TAKE 1 TABLET BY MOUTH DAILY; MAY TAKE AN ADDITIONAL TABLET AS NEEDED FOR SWELLING. 100 tablet 3    multivitamin (ONE DAILY MULTIVITAMIN) per tablet Take 1 tablet by mouth once daily.      mupirocin (BACTROBAN) 2 % ointment Apply to affected area 3 times daily 22 g 1    peg 400-propylene glycol (SYSTANE) 0.4-0.3 % Drop Place 1-2 drops into both eyes as needed.       potassium chloride (MICRO-K) 10 MEQ CpSR TAKE 1 CAPSULE EVERY DAY 90 capsule 3    pravastatin (PRAVACHOL) 40 MG tablet Take 1 tablet (40 mg total) by mouth 2 (two) times daily. (Patient taking differently: Take 40 mg by mouth once daily. ) 180 tablet 3    triamcinolone (NASACORT) 55 mcg nasal inhaler 2 sprays by Nasal route once daily. 17 g 0    warfarin (COUMADIN) 5 MG tablet Take 1 tablet (5 mg total) by mouth Daily. 30 tablet 11     Current Facility-Administered Medications on File Prior to Visit   Medication Dose Route Frequency Provider Last Rate Last Dose    [COMPLETED] iohexoL (OMNIPAQUE 350) injection 85 mL  85 mL Intravenous ONCE PRN Abundio Curtis MD   85 mL at 20 0942     Social History:     Social History     Tobacco Use    Smoking status: Former Smoker     Types: Cigarettes     Quit date: 10/29/1982     Years since quittin.7    Smokeless tobacco: Never Used   Substance Use Topics    Alcohol use: Yes     Alcohol/week: 0.0 standard drinks     Comment: socially     Family History:     Family History   Problem Relation Age of Onset    Hypertension Mother     Hypertension Father     Liver disease Sister     Diabetes Sister     Heart disease Sister     Diabetes Brother     Breast cancer Maternal Aunt     No Known Problems Maternal Uncle     No Known Problems Paternal Aunt     No Known Problems Paternal Uncle     No Known Problems Maternal Grandmother   "   No Known Problems Maternal Grandfather     No Known Problems Paternal Grandmother     No Known Problems Paternal Grandfather     No Known Problems Daughter     No Known Problems Son     Heart disease Sister     No Known Problems Son     No Known Problems Son     Cancer Neg Hx         in first degree relatives    Melanoma Neg Hx     Psoriasis Neg Hx     Lupus Neg Hx     Amblyopia Neg Hx     Blindness Neg Hx     Cataracts Neg Hx     Glaucoma Neg Hx     Macular degeneration Neg Hx     Retinal detachment Neg Hx     Strabismus Neg Hx     Stroke Neg Hx     Thyroid disease Neg Hx      Physical Exam:   BP (!) 174/84 (BP Location: Left arm, Patient Position: Sitting, BP Method: Large (Automatic))   Pulse 71   Ht 5' 6" (1.676 m)   Wt 76.9 kg (169 lb 8.5 oz)   LMP  (LMP Unknown)   SpO2 100%   BMI 27.36 kg/m²      Constitutional: NAD, conversant  HEENT: Sclera anicteric, PERRLA, EOMI  Neck: No JVD, no carotid bruits  CV: Irregularly irregular, no murmur, normal S1/S2  Pulm: CTAB, no wheezes, rales, or ronchi  GI: Abdomen soft, NTND, +BS  Extremities: 1+ LE edema, warm and well perfused  Skin: No ecchymosis, erythema, or ulcers  Psych: AOx3, appropriate affect  Neuro: No gross deficits    Labs:     Lab Results   Component Value Date     06/26/2020    K 3.7 06/26/2020     06/26/2020    CO2 26 06/26/2020    BUN 14 06/26/2020    CREATININE 0.8 06/26/2020    ANIONGAP 10 06/26/2020     Lab Results   Component Value Date    HGBA1C 5.6 06/25/2020     Lab Results   Component Value Date     (H) 01/01/2020     (H) 01/24/2018     (H) 01/24/2017    Lab Results   Component Value Date    WBC 2.69 (L) 06/26/2020    HGB 10.7 (L) 06/26/2020    HCT 32.5 (L) 06/26/2020     06/26/2020    GRAN 1.3 (L) 06/26/2020    GRAN 48.8 06/26/2020     Lab Results   Component Value Date    CHOL 206 (H) 06/25/2020    HDL 74 06/25/2020    LDLCALC 114.2 06/25/2020    TRIG 89 06/25/2020      "     Imaging:   Cardiac CT (7/7/2020)  Left atrial appendage origin measurements as follows: 2.9 cm (coronal series 601, image 103), 2.4 cm (sagittal series 602, image 114.)  Maximum transverse diameter of the left atrial appendage is approximately 4.3 cm (sagittal series 303, image 43).  Delayed series shows complete enhancement of the left atrial appendage with no definite filling defect detected.    Assessment:     1. Chronic atrial fibrillation    2. Essential hypertension    3. Atherosclerosis of aorta    4. Pure hypercholesterolemia      Plan:     Chronic atrial fibrillation  - Chronic AFib since 2016, on Coumadin  - Labile INR's recently, balance issues  - CHADS-VASc = 7, HASBLED = 5  - Cardiac CT completed for appendage dimensions  - Plan for LAAO (Watchman), will obtain SONYA at the time of the procedure  - Anti-platelet Therapy: ASA 81 mg Daily, on warfarin  - Access: R CFV  - Creatinine/CrCl: 0.8 on 6/26/2020  - Allergies: No shellfish/Iodine allergy  - Pre-Hydration: 3 cc/kg/hr IV, continuous, for 1 hour, Pre-Procedure  - Pre-Op Med: Diphenhydramine (Benadryl) 50 mg, Oral, Once, Pre-Procedure   - All patient's questions were answered  - The risks, benefits & alternatives of the procedure were explained to the patient  - The risks of FERNANDA occlusion include but are not limited to: Bleeding, infection, heart rhythm abnormalities, allergic reactions, kidney injury requiring dilaysis, limb loss, stroke and death  - The risks of moderate sedation include hypotension, respiratory depression, arrhythmias, bronchospasm, & death  - Informed consent was obtained & the patient is agreeable to proceed with the procedure    Hypertension  - Well controlled in clinic today  - Continue furosemide 20 mg Daily    Pure hypercholesterolemia  - Lipid panel as above  - Continue pravastatin 40 mg Daily      Signed:  Fernando Moon MD  Advanced Interventional Cardiology Fellow, PGY-8  Pager: [phone]  7/7/2020 10:21  AM    Interventional Cardiology Staff  I have personally taken the history and examined this patient. I have discussed and agree with the resident's findings and plan as documented in the resident's note.  Has bled equal 5, chads Vasc equals 7, referred for left atrial appendage occlusion.  Patient understands and accepts risks of the procedure and wishes to proceed.    Abundio Curtis

## 2020-07-07 NOTE — ASSESSMENT & PLAN NOTE
- Chronic AFib since 2016, on Coumadin  - Labile INR's recently, balance issues  - CHADS-VASc = 7, HASBLED = 5  - Cardiac CT completed for appendage dimensions  - Plan for LAAO (Watchman), will obtain SONYA at the time of the procedure  - Anti-platelet Therapy: ASA 81 mg Daily, on warfarin  - Access: R CFV  - Creatinine/CrCl: 0.8 on 6/26/2020  - Allergies: No shellfish/Iodine allergy  - Pre-Hydration: 3 cc/kg/hr IV, continuous, for 1 hour, Pre-Procedure  - Pre-Op Med: Diphenhydramine (Benadryl) 50 mg, Oral, Once, Pre-Procedure   - All patient's questions were answered  - The risks, benefits & alternatives of the procedure were explained to the patient  - The risks of FERNANDA occlusion include but are not limited to: Bleeding, infection, heart rhythm abnormalities, allergic reactions, kidney injury requiring dilaysis, limb loss, stroke and death  - The risks of moderate sedation include hypotension, respiratory depression, arrhythmias, bronchospasm, & death  - Informed consent was obtained & the patient is agreeable to proceed with the procedure

## 2020-07-07 NOTE — PROGRESS NOTES
Interventional Cardiology Clinic Note  Reason for Visit: AFib/Watchman evaluation  Referring Physician: Dr. Saenz/Mary Smith    HPI:   Jenniffer Jimenez is a 88 y.o. female who presents for Atrial Fibrillation    She has a PMHx of chronic Afib w/ on warfarin, TIA in 1997 and 2005, HTN, HLD, HFpEF, CKD III, hearing loss requiring a hearing aid.    Patient was seen in cardiology clinic 6/16/2020, having balance issues with falls leading to LE wounds, hence referred to IC clinic for Watchman evaluation. Wounds are now recovered on her L leg. No recent falls, but INR's have been labile and occasionally subtherapeutic in recent months.    Denies chest pain, SOB, palpitations, PND, or orthopnea. Has occasional LE edema. Lives alone, is unsteady on her feet for a long time but can ambulate without assistance at home. Use a rolling walker when she leaves the house.    AFib chronic since 2016.  CHADS2-VASc = 7 (11.2% annual risk of stroke, 15.7% risk of stroke/TIA/systemic embolism)  HASBLED = 5.    ROS:    Constitution: Negative for fever, chills, weight loss or gain.   HENT: Negative for sore throat, rhinorrhea, or headache.  Eyes: Negative for blurred or double vision.   Cardiovascular: See above  Pulmonary: Negative for SOB   Gastrointestinal: Negative for abdominal pain, nausea, vomiting, or diarrhea.   : Negative for dysuria.   Neurological: Negative for focal weakness or sensory changes.  PMH:     Past Medical History:   Diagnosis Date    Amblyopia of left eye 4/10/2013    Anticoagulant long-term use     Coumadin - A-Fib    Anxiety     Arthritis     Facet arthropathy, Lumbosacral    Atherosclerosis of aorta     Atrial fibrillation     Auricular fibrillation     Central retinal vein occlusion of left eye     CHF (congestive heart failure)     Chronic kidney disease, stage 3     Coronary artery disease     Depression     Diastolic heart failure 8/20/2014    Exotropia of both eyes 2/6/2013     "recession RSR 5.0mm w/ adj; recession LR os 5.0 w/ adj; resect MR os  4.0mm    Hearing loss     History of resection of small bowel     Hyperlipidemia     Hypertension     Hypertensive retinopathy of both eyes     Hypoglycemia     Macular degeneration     Meniere's disease of both ears     OA (osteoarthritis) of shoulder     Right    Osteoporosis     Other and unspecified hyperlipidemia     Posterior vitreous detachment of both eyes     Rhinitis     TIA (transient ischemic attack)      Past Surgical History:   Procedure Laterality Date    APPENDECTOMY      CARDIAC CATHETERIZATION      CATARACT EXTRACTION W/  INTRAOCULAR LENS IMPLANT Bilateral      SECTION, CLASSIC      HYSTERECTOMY      INNER EAR SURGERY      JOINT REPLACEMENT      LEFT KNEE REPLACEMENT IN  -    OOPHORECTOMY      SINUS SURGERY      STRABISMUS SURGERY  13    RSR recession 5 mm, LLR recession 5 mm and LMR resection 4mm    STRABISMUS SURGERY  2014    recess LR OD 6mm    TONSILLECTOMY       Allergies:     Review of patient's allergies indicates:   Allergen Reactions    Bactrim [sulfamethoxazole-trimethoprim] Other (See Comments)     "Couldn't walk"    Opioids - morphine analogues Other (See Comments)     Bowel issues; bowel obstruction    Tizanidine Other (See Comments)     "Lips were numb,  Almost passed out."    Tramadol Hallucinations    Beta-blockers (beta-adrenergic blocking agts) Other (See Comments)     Can not go on beta blockers for long period of time - due to taking allergy injections    Phenergan [promethazine] Other (See Comments)     Per pt. request- saw sisters have bad reaction to drug     Medications:     Current Outpatient Medications on File Prior to Visit   Medication Sig Dispense Refill    cholecalciferol, vitamin D3, (VITAMIN D3 ORAL) Take 1 tablet by mouth once daily.      diclofenac sodium (VOLTAREN) 1 % Gel Apply 2 g topically 3 (three) times daily. Apply to ankle for pain " 1 Tube 0    fluticasone propionate (FLONASE) 50 mcg/actuation nasal spray USE 1 SPRAY IN EACH NOSTRIL ONE TIME DAILY 32 g 11    furosemide (LASIX) 20 MG tablet TAKE 1 TABLET BY MOUTH DAILY; MAY TAKE AN ADDITIONAL TABLET AS NEEDED FOR SWELLING. 100 tablet 3    multivitamin (ONE DAILY MULTIVITAMIN) per tablet Take 1 tablet by mouth once daily.      mupirocin (BACTROBAN) 2 % ointment Apply to affected area 3 times daily 22 g 1    peg 400-propylene glycol (SYSTANE) 0.4-0.3 % Drop Place 1-2 drops into both eyes as needed.       potassium chloride (MICRO-K) 10 MEQ CpSR TAKE 1 CAPSULE EVERY DAY 90 capsule 3    pravastatin (PRAVACHOL) 40 MG tablet Take 1 tablet (40 mg total) by mouth 2 (two) times daily. (Patient taking differently: Take 40 mg by mouth once daily. ) 180 tablet 3    triamcinolone (NASACORT) 55 mcg nasal inhaler 2 sprays by Nasal route once daily. 17 g 0    warfarin (COUMADIN) 5 MG tablet Take 1 tablet (5 mg total) by mouth Daily. 30 tablet 11     Current Facility-Administered Medications on File Prior to Visit   Medication Dose Route Frequency Provider Last Rate Last Dose    [COMPLETED] iohexoL (OMNIPAQUE 350) injection 85 mL  85 mL Intravenous ONCE PRN Abundio Curtis MD   85 mL at 20 0942     Social History:     Social History     Tobacco Use    Smoking status: Former Smoker     Types: Cigarettes     Quit date: 10/29/1982     Years since quittin.7    Smokeless tobacco: Never Used   Substance Use Topics    Alcohol use: Yes     Alcohol/week: 0.0 standard drinks     Comment: socially     Family History:     Family History   Problem Relation Age of Onset    Hypertension Mother     Hypertension Father     Liver disease Sister     Diabetes Sister     Heart disease Sister     Diabetes Brother     Breast cancer Maternal Aunt     No Known Problems Maternal Uncle     No Known Problems Paternal Aunt     No Known Problems Paternal Uncle     No Known Problems Maternal Grandmother   "   No Known Problems Maternal Grandfather     No Known Problems Paternal Grandmother     No Known Problems Paternal Grandfather     No Known Problems Daughter     No Known Problems Son     Heart disease Sister     No Known Problems Son     No Known Problems Son     Cancer Neg Hx         in first degree relatives    Melanoma Neg Hx     Psoriasis Neg Hx     Lupus Neg Hx     Amblyopia Neg Hx     Blindness Neg Hx     Cataracts Neg Hx     Glaucoma Neg Hx     Macular degeneration Neg Hx     Retinal detachment Neg Hx     Strabismus Neg Hx     Stroke Neg Hx     Thyroid disease Neg Hx      Physical Exam:   BP (!) 174/84 (BP Location: Left arm, Patient Position: Sitting, BP Method: Large (Automatic))   Pulse 71   Ht 5' 6" (1.676 m)   Wt 76.9 kg (169 lb 8.5 oz)   LMP  (LMP Unknown)   SpO2 100%   BMI 27.36 kg/m²      Constitutional: NAD, conversant  HEENT: Sclera anicteric, PERRLA, EOMI  Neck: No JVD, no carotid bruits  CV: Irregularly irregular, no murmur, normal S1/S2  Pulm: CTAB, no wheezes, rales, or ronchi  GI: Abdomen soft, NTND, +BS  Extremities: 1+ LE edema, warm and well perfused  Skin: No ecchymosis, erythema, or ulcers  Psych: AOx3, appropriate affect  Neuro: No gross deficits    Labs:     Lab Results   Component Value Date     06/26/2020    K 3.7 06/26/2020     06/26/2020    CO2 26 06/26/2020    BUN 14 06/26/2020    CREATININE 0.8 06/26/2020    ANIONGAP 10 06/26/2020     Lab Results   Component Value Date    HGBA1C 5.6 06/25/2020     Lab Results   Component Value Date     (H) 01/01/2020     (H) 01/24/2018     (H) 01/24/2017    Lab Results   Component Value Date    WBC 2.69 (L) 06/26/2020    HGB 10.7 (L) 06/26/2020    HCT 32.5 (L) 06/26/2020     06/26/2020    GRAN 1.3 (L) 06/26/2020    GRAN 48.8 06/26/2020     Lab Results   Component Value Date    CHOL 206 (H) 06/25/2020    HDL 74 06/25/2020    LDLCALC 114.2 06/25/2020    TRIG 89 06/25/2020      "     Imaging:   Cardiac CT (7/7/2020)  Left atrial appendage origin measurements as follows: 2.9 cm (coronal series 601, image 103), 2.4 cm (sagittal series 602, image 114.)  Maximum transverse diameter of the left atrial appendage is approximately 4.3 cm (sagittal series 303, image 43).  Delayed series shows complete enhancement of the left atrial appendage with no definite filling defect detected.    Assessment:     1. Chronic atrial fibrillation    2. Essential hypertension    3. Atherosclerosis of aorta    4. Pure hypercholesterolemia      Plan:     Chronic atrial fibrillation  - Chronic AFib since 2016, on Coumadin  - Labile INR's recently, balance issues  - CHADS-VASc = 7, HASBLED = 5  - Cardiac CT completed for appendage dimensions  - Plan for LAAO (Watchman), will obtain SONYA at the time of the procedure  - Anti-platelet Therapy: ASA 81 mg Daily, on warfarin  - Access: R CFV  - Creatinine/CrCl: 0.8 on 6/26/2020  - Allergies: No shellfish/Iodine allergy  - Pre-Hydration: 3 cc/kg/hr IV, continuous, for 1 hour, Pre-Procedure  - Pre-Op Med: Diphenhydramine (Benadryl) 50 mg, Oral, Once, Pre-Procedure   - All patient's questions were answered  - The risks, benefits & alternatives of the procedure were explained to the patient  - The risks of FERNANDA occlusion include but are not limited to: Bleeding, infection, heart rhythm abnormalities, allergic reactions, kidney injury requiring dilaysis, limb loss, stroke and death  - The risks of moderate sedation include hypotension, respiratory depression, arrhythmias, bronchospasm, & death  - Informed consent was obtained & the patient is agreeable to proceed with the procedure    Hypertension  - Well controlled in clinic today  - Continue furosemide 20 mg Daily    Pure hypercholesterolemia  - Lipid panel as above  - Continue pravastatin 40 mg Daily      Signed:  Fernando Moon MD  Advanced Interventional Cardiology Fellow, PGY-8  Pager: [phone]  7/7/2020 10:21  AM    Interventional Cardiology Staff  I have personally taken the history and examined this patient. I have discussed and agree with the resident's findings and plan as documented in the resident's note.  Has bled equal 5, chads Vasc equals 7, referred for left atrial appendage occlusion.  Patient understands and accepts risks of the procedure and wishes to proceed.    Abundio Curtis

## 2020-07-07 NOTE — LETTER
July 7, 2020      Mary Smith NP  [address]  [facility] [address]           [facility]  [address]  [facility] [address]  Phone: [phone]          Patient: Jenniffer Jimenez   MR Number: [id number]   YOB: 1932   Date of Visit: 7/7/2020       Dear Mary Smith:    Thank you for referring Jenniffer Jimenez to me for evaluation. Attached you will find relevant portions of my assessment and plan of care.    If you have questions, please do not hesitate to call me. I look forward to following Jenniffer Jimenez along with you.    Sincerely,    Abundio Curtis MD    Enclosure  CC:  No Recipients    If you would like to receive this communication electronically, please contact [email or web address] or [phone] to request more information on [facility] access.    For providers and/or their staff who would like to refer a patient to [facility], please contact us through our one-stop-shop provider referral line, [facility], at [phone].    If you feel you have received this communication in error or would no longer like to receive these types of communications, please e-mail [email or web address]

## 2020-07-07 NOTE — PATIENT INSTRUCTIONS

## 2020-07-07 NOTE — PROGRESS NOTES
HPI     Eye Problem      Additional comments: BRVO              Comments     Referred by Dr. Montelongo    Patient states she lost vision OS for a moment that started 2 weeks. Also   reports decrease in vision OS since it started.          Last edited by Mary Schwarz, OD on 7/7/2020  2:54 PM. (History)        OCT - OD - no ME  OS - SRF      A/P    1. Wet AMD OS  PPCNVM    Avastin OS today    2. Prior CRVO OS  Stable    3. PCIOL OU    4. S/p strab sx    5. HTN Ret OU  BP control      1 month OCT no dilate      Risks, benefits, and alternatives to treatment discussed in detail with the patient.  The patient voiced understanding and wished to proceed with the procedure    Injection Procedure Note:  Diagnosis: Wet AMD OS    Patient Identified and Time Out complete  Pt Prefers to be marked with sticker rather than ink marker ([id number] #[id number])  Topical Proparacaine and Betadine.  Inject Avastin OS at 6:00 @ 3.5-4mm posterior to limbus  Post Operative Dx: Same  Complications: None  Follow up as above.

## 2020-07-07 NOTE — PROGRESS NOTES
INR good on new dose. Noted visit with Dr. Curtis today and plans to move forward with Watchman procedure. Current date for procedure is 8/5. Maintain new dose. Recheck INR in 1 week w/ other appts

## 2020-07-13 ENCOUNTER — NURSE TRIAGE (OUTPATIENT)
Dept: ADMINISTRATIVE | Facility: CLINIC | Age: 85
End: 2020-07-13

## 2020-07-13 ENCOUNTER — TELEPHONE (OUTPATIENT)
Dept: CARDIOLOGY | Facility: CLINIC | Age: 85
End: 2020-07-13

## 2020-07-13 ENCOUNTER — TELEPHONE (OUTPATIENT)
Dept: INTERNAL MEDICINE | Facility: CLINIC | Age: 85
End: 2020-07-13

## 2020-07-13 NOTE — TELEPHONE ENCOUNTER
She can take Tylenol for the fever  Do not see any contraindication in her chart for it     and ice packs to the injection site   and benadryl or zyrtec type antihistamine  That is usually very helpful w/ these reactions

## 2020-07-13 NOTE — TELEPHONE ENCOUNTER
Spoke with pt:  Had shingles shot yesterday.  And has fever 100.2F. arm is tender. No SOB no sore throat.     I read MD instructions to pt: She can take Tylenol for the fever  Do not see any contraindication in her chart for it- and ice packs to the injection site and benadryl or zyrtec type antihistamine  That is usually very helpful w/ these reactions    Pt verbalizes understanding protocol advice            Reason for Disposition   Fever with no signs of serious infection or localizing symptoms    Additional Information   Negative: Difficult to awaken or acting confused (e.g., disoriented, slurred speech)   Negative: Pale cold skin and very weak (can't stand)   Negative: Difficulty breathing and bluish (or gray) lips or face   Negative: New onset rash with purple (or blood-colored) spots or dots   Negative: Sounds like a life-threatening emergency to the triager   Negative: Headache and stiff neck (can't touch chin to chest)   Negative: Difficulty breathing   Negative: IV drug abuse   Negative: Fever > 103 F (39.4 C)   Negative: Fever > 101 F (38.3 C) and over 60 years of age   Negative: Fever > 100.0 F (37.8 C) and diabetes mellitus or a weak immune system (e.g., HIV positive, chemotherapy, splenectomy)   Negative: Fever > 100.0 F (37.8 C) and bedridden (e.g., nursing home patient, stroke, chronic illness, recovering from surgery)   Negative: Fever > 100.0 F (37.8 C) and indwelling urinary catheter (e.g., Sinclair, coude)   Negative: Fever > 100.5 F (38.1 C) and has port (portacath), central line, or PICC line   Negative: Drinking very little and has signs of dehydration (e.g., no urine > 12 hours, very dry mouth, very lightheaded)   Negative: Patient sounds very sick or weak to the triager   Negative: Fever > 100.5 F (38.1 C) and surgery in the past month   Negative: Transplant patient (e.g., liver, heart, lung, kidney)   Negative: Widespread rash and cause unknown   Negative: Patient wants to  be seen   Negative: Fever present > 3 days (72 hours)   Negative: Intermittent fever > 100.5 F persists > 3 weeks   Negative: Fever > 100.0 F (37.8 C) and foreign travel to a developing country in the past month    Protocols used: FEVER-A-OH

## 2020-07-13 NOTE — TELEPHONE ENCOUNTER
----- Message from Theresa Sifuentes sent at 7/13/2020  4:08 PM CDT -----  Regarding: Question about tylenol  Pt said she got the shingles shot and she want to know if she can take tylenol for a low grade fever.    Thanks

## 2020-07-13 NOTE — PROGRESS NOTES
Patient called to report that yesterday had the 2nd shingles vaccine yesterday  and had low grade fever (100.2)  -took 1 Tylenol --500mg  which was  in April

## 2020-07-13 NOTE — TELEPHONE ENCOUNTER
Patient had the  Shingle vaccine on sat evening a started to feel bad last night .   Her current temp is 99.8 she has not taking and thing for the fever .   She concerned about taking tylenol with her current health condition .  She has not been exposed to covid nether has she been tested for it

## 2020-07-13 NOTE — PROGRESS NOTES
Note reviewed. No changes for us needed. Patient should discard  meds and not take them in the future. Maintain dose and INR as planned

## 2020-07-15 ENCOUNTER — OFFICE VISIT (OUTPATIENT)
Dept: PODIATRY | Facility: CLINIC | Age: 85
End: 2020-07-15
Payer: MEDICARE

## 2020-07-15 ENCOUNTER — OUTPATIENT CASE MANAGEMENT (OUTPATIENT)
Dept: ADMINISTRATIVE | Facility: OTHER | Age: 85
End: 2020-07-15

## 2020-07-15 VITALS
HEIGHT: 66 IN | DIASTOLIC BLOOD PRESSURE: 75 MMHG | SYSTOLIC BLOOD PRESSURE: 133 MMHG | HEART RATE: 81 BPM | BODY MASS INDEX: 27.25 KG/M2 | WEIGHT: 169.56 LBS

## 2020-07-15 DIAGNOSIS — E11.42 DIABETIC POLYNEUROPATHY ASSOCIATED WITH TYPE 2 DIABETES MELLITUS: ICD-10-CM

## 2020-07-15 DIAGNOSIS — G60.9 IDIOPATHIC PERIPHERAL NEUROPATHY: Primary | ICD-10-CM

## 2020-07-15 DIAGNOSIS — B35.1 ONYCHOMYCOSIS DUE TO DERMATOPHYTE: ICD-10-CM

## 2020-07-15 PROCEDURE — 1159F MED LIST DOCD IN RCRD: CPT | Mod: HCNC,S$GLB,, | Performed by: STUDENT IN AN ORGANIZED HEALTH CARE EDUCATION/TRAINING PROGRAM

## 2020-07-15 PROCEDURE — 1101F PT FALLS ASSESS-DOCD LE1/YR: CPT | Mod: HCNC,CPTII,S$GLB, | Performed by: STUDENT IN AN ORGANIZED HEALTH CARE EDUCATION/TRAINING PROGRAM

## 2020-07-15 PROCEDURE — 99214 PR OFFICE/OUTPT VISIT, EST, LEVL IV, 30-39 MIN: ICD-10-PCS | Mod: 25,HCNC,S$GLB, | Performed by: STUDENT IN AN ORGANIZED HEALTH CARE EDUCATION/TRAINING PROGRAM

## 2020-07-15 PROCEDURE — 99999 PR PBB SHADOW E&M-EST. PATIENT-LVL III: ICD-10-PCS | Mod: PBBFAC,HCNC,, | Performed by: STUDENT IN AN ORGANIZED HEALTH CARE EDUCATION/TRAINING PROGRAM

## 2020-07-15 PROCEDURE — 99999 PR PBB SHADOW E&M-EST. PATIENT-LVL III: CPT | Mod: PBBFAC,HCNC,, | Performed by: STUDENT IN AN ORGANIZED HEALTH CARE EDUCATION/TRAINING PROGRAM

## 2020-07-15 PROCEDURE — 11721 DEBRIDE NAIL 6 OR MORE: CPT | Mod: Q9,HCNC,S$GLB, | Performed by: STUDENT IN AN ORGANIZED HEALTH CARE EDUCATION/TRAINING PROGRAM

## 2020-07-15 PROCEDURE — 1126F PR PAIN SEVERITY QUANTIFIED, NO PAIN PRESENT: ICD-10-PCS | Mod: HCNC,S$GLB,, | Performed by: STUDENT IN AN ORGANIZED HEALTH CARE EDUCATION/TRAINING PROGRAM

## 2020-07-15 PROCEDURE — 1126F AMNT PAIN NOTED NONE PRSNT: CPT | Mod: HCNC,S$GLB,, | Performed by: STUDENT IN AN ORGANIZED HEALTH CARE EDUCATION/TRAINING PROGRAM

## 2020-07-15 PROCEDURE — 1159F PR MEDICATION LIST DOCUMENTED IN MEDICAL RECORD: ICD-10-PCS | Mod: HCNC,S$GLB,, | Performed by: STUDENT IN AN ORGANIZED HEALTH CARE EDUCATION/TRAINING PROGRAM

## 2020-07-15 PROCEDURE — 99214 OFFICE O/P EST MOD 30 MIN: CPT | Mod: 25,HCNC,S$GLB, | Performed by: STUDENT IN AN ORGANIZED HEALTH CARE EDUCATION/TRAINING PROGRAM

## 2020-07-15 PROCEDURE — 11721 ROUTINE FOOT CARE: ICD-10-PCS | Mod: Q9,HCNC,S$GLB, | Performed by: STUDENT IN AN ORGANIZED HEALTH CARE EDUCATION/TRAINING PROGRAM

## 2020-07-15 PROCEDURE — 1101F PR PT FALLS ASSESS DOC 0-1 FALLS W/OUT INJ PAST YR: ICD-10-PCS | Mod: HCNC,CPTII,S$GLB, | Performed by: STUDENT IN AN ORGANIZED HEALTH CARE EDUCATION/TRAINING PROGRAM

## 2020-07-15 PROCEDURE — 99499 RISK ADDL DX/OHS AUDIT: ICD-10-PCS | Mod: HCNC,S$GLB,, | Performed by: STUDENT IN AN ORGANIZED HEALTH CARE EDUCATION/TRAINING PROGRAM

## 2020-07-15 PROCEDURE — 99499 UNLISTED E&M SERVICE: CPT | Mod: HCNC,S$GLB,, | Performed by: STUDENT IN AN ORGANIZED HEALTH CARE EDUCATION/TRAINING PROGRAM

## 2020-07-15 NOTE — PROCEDURES
Routine Foot Care    Date/Time: 7/15/2020 11:30 AM  Performed by: Hilda Vences DPM  Authorized by: Hilda Vences DPM     Consent Done?:  Yes (Verbal)    Nail Care Type:  Debride  Location(s): All  (Left 1st Toe, Left 3rd Toe, Left 2nd Toe, Left 4th Toe, Left 5th Toe, Right 1st Toe, Right 2nd Toe, Right 3rd Toe, Right 4th Toe and Right 5th Toe)  Patient tolerance:  Patient tolerated the procedure well with no immediate complications

## 2020-07-15 NOTE — PROGRESS NOTES
Subjective:      Patient ID: Jenniffer Jimenez is a 88 y.o. female.    Chief Complaint: Nail Problem (fungus on nails of right foot )    Jenniffer is a 88 y.o. female who presents to the clinic upon referral from Dr. Tootie corey. provider found  for evaluation and treatment of diabetic feet. Jenniffer has a past medical history of Amblyopia of left eye (4/10/2013), Anticoagulant long-term use, Anxiety, Arthritis, Atherosclerosis of aorta, Atrial fibrillation, Auricular fibrillation, Central retinal vein occlusion of left eye, CHF (congestive heart failure), Chronic kidney disease, stage 3, Coronary artery disease, Depression, Diastolic heart failure (8/20/2014), Exotropia of both eyes (2/6/2013), Hearing loss, History of resection of small bowel, Hyperlipidemia, Hypertension, Hypertensive retinopathy of both eyes, Hypoglycemia, Macular degeneration, Meniere's disease of both ears, OA (osteoarthritis) of shoulder, Osteoporosis, Other and unspecified hyperlipidemia, Posterior vitreous detachment of both eyes, Rhinitis, and TIA (transient ischemic attack). Patient relates no major problem with feet. Only complaints today consist of elongated, thickened toe nails. States she can't cut them due to periphernal neuropathy. Relates ankle pain is no longer bothering her. No other pedal complaints at this time.     PCP: Rubi Young,     Date Last Seen by PCP: See epic    Current shoe gear: Casual shoes    Hemoglobin A1C   Date Value Ref Range Status   06/25/2020 5.6 4.0 - 5.6 % Final     Comment:     ADA Screening Guidelines:  5.7-6.4%  Consistent with prediabetes  >or=6.5%  Consistent with diabetes  High levels of fetal hemoglobin interfere with the HbA1C  assay. Heterozygous hemoglobin variants (HbS, HgC, etc)do  not significantly interfere with this assay.   However, presence of multiple variants may affect accuracy.     10/03/2019 6.0 (H) 4.0 - 5.6 % Final     Comment:     ADA Screening Guidelines:  5.7-6.4%  Consistent with  prediabetes  >or=6.5%  Consistent with diabetes  High levels of fetal hemoglobin interfere with the HbA1C  assay. Heterozygous hemoglobin variants (HbS, HgC, etc)do  not significantly interfere with this assay.   However, presence of multiple variants may affect accuracy.     12/03/2018 5.9 (H) 4.0 - 5.6 % Final     Comment:     ADA Screening Guidelines:  5.7-6.4%  Consistent with prediabetes  >or=6.5%  Consistent with diabetes  High levels of fetal hemoglobin interfere with the HbA1C  assay. Heterozygous hemoglobin variants (HbS, HgC, etc)do  not significantly interfere with this assay.   However, presence of multiple variants may affect accuracy.             Review of Systems   Constitution: Negative for chills, decreased appetite, diaphoresis and fever.   HENT: Negative for congestion and hearing loss.    Cardiovascular: Negative for chest pain, claudication, leg swelling and syncope.   Respiratory: Negative for cough and shortness of breath.    Skin: Positive for nail changes. Negative for color change, dry skin, flushing, itching, poor wound healing, rash, suspicious lesions and unusual hair distribution.   Musculoskeletal: Positive for joint pain, joint swelling and myalgias.   Gastrointestinal: Negative for nausea and vomiting.   Neurological: Positive for numbness. Negative for loss of balance, paresthesias and sensory change.   Psychiatric/Behavioral: Negative for altered mental status, suicidal ideas and thoughts of violence. The patient is not nervous/anxious.            Objective:      Physical Exam  Constitutional:       General: She is not in acute distress.     Appearance: She is well-developed. She is not diaphoretic.   Cardiovascular:      Pulses:           Dorsalis pedis pulses are 2+ on the right side and 2+ on the left side.        Posterior tibial pulses are 1+ on the right side and 1+ on the left side.      Comments:     Skin temperature is within normal limits. Toes are cool to touch and feet  are warm proximally. Hair growth is diminished. Skin is mildly atrophic and with mild hyperpigmentation. Mild edema noted, dean.   Musculoskeletal:         General: No tenderness.      Right foot: Decreased range of motion. Deformity present.      Left foot: Decreased range of motion. Deformity present.      Comments: Muscle strength is 4/5 to all LE muscle groups, dean. No POP noted.     Feet:      Right foot:      Skin integrity: Dry skin present. No blister, erythema or warmth.      Left foot:      Skin integrity: Dry skin present. No blister, erythema or warmth.   Lymphadenopathy:      Comments: Negative lymphadenopathy bilateral popliteal fossa and tarsal tunnel.    Skin:     General: Skin is warm and dry.      Coloration: Skin is not pale.      Findings: No abrasion, bruising, burn, erythema, laceration, petechiae or rash.      Nails: There is no clubbing.        Comments: Skin is warm and dry, bilaterally, no acute SOI noted, bilaterally, appears stable.     Nails are elongated, thickened by 2-4 mm's, dystrophic, and are darkened in coloration with subungual fungal debris.        Neurological:      Mental Status: She is alert and oriented to person, place, and time.      Sensory: No sensory deficit.      Motor: No abnormal muscle tone.      Comments:   Barberton-Anne 5.07 monofilamant testing diminished. Sharp/dull sensation diminished bilaterally. Light touch sensation is diminished bilaterally. Vibratory sensation is diminished dean.    Psychiatric:         Behavior: Behavior normal.         Thought Content: Thought content normal.         Judgment: Judgment normal.               Assessment:       Encounter Diagnoses   Name Primary?    Idiopathic peripheral neuropathy Yes    Onychomycosis due to dermatophyte     Diabetic polyneuropathy associated with type 2 diabetes mellitus          Plan:       Jenniffer was seen today for nail problem.    Diagnoses and all orders for this visit:    Idiopathic peripheral  neuropathy    Onychomycosis due to dermatophyte    Diabetic polyneuropathy associated with type 2 diabetes mellitus      I counseled the patient on her conditions, their implications and medical management.    Nails debrided, see procedure note.     Continue topical antifungal, pt states penlac is difficult to remove. New topical anti-fungal script sent per [facility].     Shoe inspection. General Foot Education. Patient instructed on proper foot hygeine. We discussed wearing proper shoe gear, daily foot inspections, never walking without protective shoe gear, never putting sharp instruments to feet. Patient reminded of the importance of good nutrition, weight loss if needed to help alleviate foot pressure/pain     Discussed general foot care:  Wear comfortable, proper fitting shoes. Wash feet daily. Dry well. After drying, apply moisturizer to feet (no lotion to webspaces). Inspect feet daily for skin breaks, blisters, swelling, or redness. Wear cotton socks (preferably white)  Change socks every day. Do NOT walk barefoot. Do NOT use heating pads or warm/hot water soaks. I discussed with the  patient  signs and symptoms of infection including redness, drainage, purulence, odor, pain, elevated BS, streaking, fever, chills, etc . Patient is to seek medical attention (ER or urgent care) if these symptoms occur      RTC PRN

## 2020-07-16 ENCOUNTER — OFFICE VISIT (OUTPATIENT)
Dept: OTOLARYNGOLOGY | Facility: CLINIC | Age: 85
End: 2020-07-16
Payer: MEDICARE

## 2020-07-16 ENCOUNTER — OUTPATIENT CASE MANAGEMENT (OUTPATIENT)
Dept: ADMINISTRATIVE | Facility: OTHER | Age: 85
End: 2020-07-16

## 2020-07-16 VITALS
BODY MASS INDEX: 26.84 KG/M2 | HEART RATE: 79 BPM | HEIGHT: 66 IN | SYSTOLIC BLOOD PRESSURE: 113 MMHG | WEIGHT: 167 LBS | DIASTOLIC BLOOD PRESSURE: 61 MMHG

## 2020-07-16 DIAGNOSIS — H61.23 BILATERAL IMPACTED CERUMEN: Primary | ICD-10-CM

## 2020-07-16 PROCEDURE — 99999 PR PBB SHADOW E&M-EST. PATIENT-LVL III: CPT | Mod: PBBFAC,HCNC,, | Performed by: NURSE PRACTITIONER

## 2020-07-16 PROCEDURE — 99999 PR PBB SHADOW E&M-EST. PATIENT-LVL III: ICD-10-PCS | Mod: PBBFAC,HCNC,, | Performed by: NURSE PRACTITIONER

## 2020-07-16 PROCEDURE — 99499 NO LOS: ICD-10-PCS | Mod: HCNC,S$GLB,, | Performed by: NURSE PRACTITIONER

## 2020-07-16 PROCEDURE — 69210 REMOVE IMPACTED EAR WAX UNI: CPT | Mod: HCNC,S$GLB,, | Performed by: NURSE PRACTITIONER

## 2020-07-16 PROCEDURE — 99499 UNLISTED E&M SERVICE: CPT | Mod: HCNC,S$GLB,, | Performed by: NURSE PRACTITIONER

## 2020-07-16 PROCEDURE — 69210 EAR CERUMEN REMOVAL: ICD-10-PCS | Mod: HCNC,S$GLB,, | Performed by: NURSE PRACTITIONER

## 2020-07-16 NOTE — PROGRESS NOTES
Called Patient to reschedule 7/15 cancelled appointment, she reports it has already been rescheduled to 7/20/20

## 2020-07-16 NOTE — PROCEDURES
Ear Cerumen Removal    Date/Time: 7/16/2020 2:00 PM  Performed by: Hung Luque NP  Authorized by: Hung Luque NP     Consent Done?:  Yes (Verbal)  Location details:  Both ears  Procedure type: curette    Cerumen  Removal Results:  Cerumen completely removed  Patient tolerance:  Patient tolerated the procedure well with no immediate complications     Procedure Note:    The patient was brought to the minor procedure room and placed under the operating microscope of the left ear canal which was cleaned of ceruminous debris. Using a combination of suction, curettes and cup forceps the patient's cerumen impaction was removed. The tympanic membrane was evaluated and was unremarkable. The patient tolerated the procedure well. There were no complications.  Procedure Note:    Patient was brought to the minor procedure room and using the operating microscope of the right ear canal which was cleaned of ceruminous debris. There was a significant cerumen impaction.  Using a combination of suction, curettes and cup forceps the patient's cerumen impaction was removed. Tympanic membrane intact. Pt tolerated well. There were no complications.

## 2020-07-17 NOTE — PROGRESS NOTES
Allergy Mix 12, Dr. Castro   pt intubated and obtunded, no family present at bedside, unable to assess

## 2020-07-17 NOTE — PROGRESS NOTES
PATIENT SUMMARY   LMSW received referral from OPCM EDITH Ledezma for assistance with food and transportation barrier. SW attempted to complete assessment but at this time patient declined. She reports that she uses [employer] and [facility] has been great. She stated she was just seeing other options. MARICRUZ discussed SNAP, food wise. MARICRUZ asked patient income. She stated she didn't think this was relevant. SW discussed this for SNAP or Commodities. She stated she didn't qualify for it. She reports she orders her food through [employer] and has it delivered but delivery fee is expensive so appreciates [facility]. She stated she loves [employer] they are always on time and has never had any issues.  MARICRUZ provided contact number and information on available community resources: 311; pravin.ready.gov. Encouraged patient to contact MARICRUZ if any additional needs arise. MARICRUZ notified RN. Case Closed

## 2020-07-19 ENCOUNTER — OFFICE VISIT (OUTPATIENT)
Dept: URGENT CARE | Facility: CLINIC | Age: 85
End: 2020-07-19
Payer: MEDICARE

## 2020-07-19 ENCOUNTER — NURSE TRIAGE (OUTPATIENT)
Dept: ADMINISTRATIVE | Facility: CLINIC | Age: 85
End: 2020-07-19

## 2020-07-19 VITALS
WEIGHT: 167 LBS | SYSTOLIC BLOOD PRESSURE: 140 MMHG | HEART RATE: 69 BPM | BODY MASS INDEX: 26.84 KG/M2 | HEIGHT: 66 IN | DIASTOLIC BLOOD PRESSURE: 71 MMHG | OXYGEN SATURATION: 98 % | RESPIRATION RATE: 14 BRPM | TEMPERATURE: 98 F

## 2020-07-19 DIAGNOSIS — R35.0 FREQUENCY OF URINATION: ICD-10-CM

## 2020-07-19 DIAGNOSIS — N39.0 ACUTE UTI (URINARY TRACT INFECTION): Primary | ICD-10-CM

## 2020-07-19 DIAGNOSIS — R30.0 DYSURIA: ICD-10-CM

## 2020-07-19 LAB
BILIRUB UR QL STRIP: POSITIVE
GLUCOSE UR QL STRIP: NEGATIVE
KETONES UR QL STRIP: NEGATIVE
LEUKOCYTE ESTERASE UR QL STRIP: POSITIVE
PH, POC UA: 5.5 (ref 5–8)
POC BLOOD, URINE: NEGATIVE
POC NITRATES, URINE: POSITIVE
PROT UR QL STRIP: POSITIVE
SP GR UR STRIP: <=1.005 (ref 1–1.03)
UROBILINOGEN UR STRIP-ACNC: POSITIVE (ref 0.1–1.1)

## 2020-07-19 PROCEDURE — 81003 POCT URINALYSIS, DIPSTICK, AUTOMATED, W/O SCOPE: ICD-10-PCS | Mod: QW,S$GLB,, | Performed by: NURSE PRACTITIONER

## 2020-07-19 PROCEDURE — 87086 URINE CULTURE/COLONY COUNT: CPT | Mod: HCNC

## 2020-07-19 PROCEDURE — 87088 URINE BACTERIA CULTURE: CPT | Mod: HCNC

## 2020-07-19 PROCEDURE — 99214 PR OFFICE/OUTPT VISIT, EST, LEVL IV, 30-39 MIN: ICD-10-PCS | Mod: 25,S$GLB,, | Performed by: NURSE PRACTITIONER

## 2020-07-19 PROCEDURE — 99214 OFFICE O/P EST MOD 30 MIN: CPT | Mod: 25,S$GLB,, | Performed by: NURSE PRACTITIONER

## 2020-07-19 PROCEDURE — 81003 URINALYSIS AUTO W/O SCOPE: CPT | Mod: QW,S$GLB,, | Performed by: NURSE PRACTITIONER

## 2020-07-19 PROCEDURE — 87077 CULTURE AEROBIC IDENTIFY: CPT | Mod: HCNC

## 2020-07-19 PROCEDURE — 87186 SC STD MICRODIL/AGAR DIL: CPT | Mod: HCNC

## 2020-07-19 RX ORDER — AMOXICILLIN AND CLAVULANATE POTASSIUM 875; 125 MG/1; MG/1
1 TABLET, FILM COATED ORAL EVERY 12 HOURS
Qty: 14 TABLET | Refills: 0 | Status: SHIPPED | OUTPATIENT
Start: 2020-07-19 | End: 2020-07-26

## 2020-07-19 NOTE — PROGRESS NOTES
"Dictation #1  MRN:[id number]  CSN:[id number]  Subjective:       Patient ID: Jenniffer Jimenez is a 88 y.o. female.    Vitals:  height is 5' 6" (1.676 m) and weight is 75.8 kg (167 lb). Her temperature is 97.6 °F (36.4 °C). Her blood pressure is 140/71 (abnormal) and her pulse is 69. Her respiration is 14 and oxygen saturation is 98%.     Chief Complaint: Urinary Tract Infection    This is a 88 y.o. female who presents today with a chief complaint of  painful urination since Friday, patient reports frequency of urination, urgency and dysuria, denies fever, denies back pain, patient reports she tried over-the-counter azo after checking with her warfarin clinic  Upon chart review patient is on long-term anticoagulation and has history of recurrent UTI last one in March 2020, patient treated with Augmentin without side effects or contraindication with her warfarin, patient last urine culture results positive for E coli    Urinary Tract Infection   This is a new problem. Episode onset: 3 days. The problem has been gradually worsening. The quality of the pain is described as burning. Associated symptoms include frequency and urgency. Pertinent negatives include no chills, flank pain, hematuria, nausea, vomiting or rash. Treatments tried: azo. The treatment provided no relief.       Constitution: Negative for chills and fever.   Neck: Negative for painful lymph nodes.   Gastrointestinal: Negative for abdominal pain, nausea and vomiting.   Genitourinary: Positive for dysuria, frequency and urgency. Negative for urine decreased, flank pain, hematuria, history of kidney stones, painful menstruation, irregular menstruation, missed menses, heavy menstrual bleeding, ovarian cysts, genital trauma, vaginal pain, vaginal discharge, vaginal bleeding, vaginal odor, painful intercourse, genital sore, painful ejaculation and pelvic pain.   Musculoskeletal: Negative for back pain.   Skin: Negative for rash and lesion. "   Hematologic/Lymphatic: Negative for swollen lymph nodes.       Objective:      Physical Exam   Constitutional: She is oriented to person, place, and time. She appears well-developed.   HENT:   Head: Normocephalic and atraumatic.   Ears:   Right Ear: External ear normal. Decreased hearing (chronic) is noted.   Left Ear: External ear normal. Decreased hearing (chronic) is noted.   Nose: Nose normal. No nasal deformity. No epistaxis.   Mouth/Throat: Oropharynx is clear and moist and mucous membranes are normal.   Eyes: Lids are normal.   Neck: Trachea normal, normal range of motion and phonation normal. Neck supple.   Cardiovascular: Normal pulses.   Pulmonary/Chest: Effort normal.   Abdominal: Soft. Normal appearance and bowel sounds are normal. She exhibits no distension. There is no abdominal tenderness. There is no rebound, no guarding, no tenderness at McBurney's point and negative Castellon's sign.   Neurological: She is alert and oriented to person, place, and time.   Skin: Skin is warm, dry and intact. Psychiatric: Her speech is normal and behavior is normal.   Nursing note and vitals reviewed.          Results for orders placed or performed in visit on 07/19/20   POCT Urinalysis, Dipstick, Automated, W/O Scope   Result Value Ref Range    POC Blood, Urine Negative Negative    POC Bilirubin, Urine Positive (A) Negative    POC Urobilinogen, Urine POSITIVE 0.1 - 1.1    POC Ketones, Urine Negative Negative    POC Protein, Urine Positive (A) Negative    POC Nitrates, Urine Positive (A) Negative    POC Glucose, Urine Negative Negative    pH, UA 5.5 5 - 8    POC Specific Gravity, Urine <=1.005 1.003 - 1.029    POC Leukocytes, Urine Positive (A) Negative     *Note: Due to a large number of results and/or encounters for the requested time period, some results have not been displayed. A complete set of results can be found in Results Review.       88-year-old female presents with urinary frequency, urgency and dysuria  since Friday.  Denies fever.  Tried over the medication Azo with no relief.  No CVA tenderness.  Urinalysis positive for 10 leukocytes and ketones.  Urine culture sent.  Patient with history of positive E coli in the past.  Will treat patient with Augmentin based on updated urine culture sensitivity results of 3/12/2020 awaiting urine culture results.  Patient is on chronic anticoagulation warfarin.  Patient's daughter came in the clinic as patient was hard of hearing to discuss plan of care.  Detailed education discussed with daughter including prescription medication and to follow-up with primary if symptom worsens or does not improve.  Strict precautions given to patient to monitor for worsening signs and symptoms. Advised to follow up with primary.All questions answered. Strict ER precautions given. If your symptoms worsens of fail to improve you should go to the Emergency Room. Patient voiced understanding and in agreement with current treatment plan.        Assessment:       1. Acute UTI (urinary tract infection)    2. Dysuria    3. Frequency of urination        Plan:         Acute UTI (urinary tract infection)  -     POCT Urinalysis, Dipstick, Automated, W/O Scope  -     Urine culture  -     amoxicillin-clavulanate 875-125mg (AUGMENTIN) 875-125 mg per tablet; Take 1 tablet by mouth every 12 (twelve) hours. for 7 days  Dispense: 14 tablet; Refill: 0    Dysuria  -     POCT Urinalysis, Dipstick, Automated, W/O Scope  -     Urine culture    Frequency of urination      Patient Instructions   PLEASE READ YOUR DISCHARGE INSTRUCTIONS ENTIRELY AS IT CONTAINS IMPORTANT INFORMATION.      Take the antibiotics to completion.     Drink plenty of fluids, wipe front to back, take showers not baths, no scented soaps, wear breathable cotton underwear, urinate after sexual intercourse.     A urine culture was sent. You will be contacted once it results and appropriate action will be taken if needed.       Please go to the ER  "for worsening symptoms including fever, worsening flank pain, vomiting, etc.       Please return or see your primary care doctor if you develop new or worsening symptoms.     Please arrange follow up with your primary medical clinic as soon as possible. You must understand that you've received an Urgent Care treatment only and that you may be released before all of your medical problems are known or treated. You, the patient, will arrange for follow up as instructed. If your symptoms worsen or fail to improve you should go to the Emergency Room.  WE CANNOT RULE OUT ALL POSSIBLE CAUSES OF YOUR SYMPTOMS IN THE URGENT CARE SETTING PLEASE GO TO THE ER IF YOU FEELS YOUR CONDITION IS WORSENING OR YOU WOULD LIKE EMERGENT EVALUATION.      Bladder Infection, Female (Adult)    Urine is normally doesn't have any bacteria in it. But bacteria can get into the urinary tract from the skin around the rectum. Or they can travel in the blood from elsewhere in the body. Once they are in your urinary tract, they can cause infection in the urethra (urethritis), the bladder (cystitis), or the kidneys (pyelonephritis).  The most common place for an infection is in the bladder. This is called a bladder infection. This is one of the most common infections in women. Most bladder infections are easily treated. They are not serious unless the infection spreads to the kidney.  The phrases "bladder infection," "UTI," and "cystitis" are often used to describe the same thing. But they are not always the same. Cystitis is an inflammation of the bladder. The most common cause of cystitis is an infection.  Symptoms  The infection causes inflammation in the urethra and bladder. This causes many of the symptoms. The most common symptoms of a bladder infection are:  · Pain or burning when urinating  · Having to urinate more often than usual  · Urgent need to urinate  · Only a small amount of urine comes out  · Blood in urine  · Abdominal discomfort. " This is usually in the lower abdomen above the pubic bone.  · Cloudy urine  · Strong- or bad-smelling urine  · Unable to urinate (urinary retention)  · Unable to hold urine in (urinary incontinence)  · Fever  · Loss of appetite  · Confusion (in older adults)  Causes  Bladder infections are not contagious. You can't get one from someone else, from a toilet seat, or from sharing a bath.  The most common cause of bladder infections is bacteria from the bowels. The bacteria get onto the skin around the opening of the urethra. From there, they can get into the urine and travel up to the bladder, causing inflammation and infection. This usually happens because of:  · Wiping improperly after urinating. Always wipe from front to back.  · Bowel incontinence  · Pregnancy  · Procedures such as having a catheter inserted  · Older age  · Not emptying your bladder. This can allow bacteria a chance to grow in your urine.  · Dehydration  · Constipation  · Sex  · Use of a diaphragm for birth control   Treatment  Bladder infections are diagnosed by a urine test. They are treated with antibiotics and usually clear up quickly without complications. Treatment helps prevent a more serious kidney infection.  Medicines  Medicines can help in the treatment of a bladder infection:  · Take antibiotics until they are used up, even if you feel better. It is important to finish them to make sure the infection has cleared.  · You can use acetaminophen or ibuprofen for pain, fever, or discomfort, unless another medicine was prescribed. If you have chronic liver or kidney disease, talk with your healthcare provider before using these medicines. Also talk with your provider if you've ever had a stomach ulcer or gastrointestinal bleeding, or are taking blood-thinner medicines.  · If you are given phenazopydridine to reduce burning with urination, it will cause your urine to become a bright orange color. This can stain clothing.  Care and  prevention  These self-care steps can help prevent future infections:  · Drink plenty of fluids to prevent dehydration and flush out your bladder. Do this unless you must restrict fluids for other health reasons, or your doctor told you not to.  · Proper cleaning after going to the bathroom is important. Wipe from front to back after using the toilet to prevent the spread of bacteria.  · Urinate more often. Don't try to hold urine in for a long time.  · Wear loose-fitting clothes and cotton underwear. Avoid tight-fitting pants.  · Improve your diet and prevent constipation. Eat more fresh fruit and vegetables, and fiber, and less junk and fatty foods.  · Avoid sex until your symptoms are gone.  · Avoid caffeine, alcohol, and spicy foods. These can irritate your bladder.  · Urinate right after intercourse to flush out your bladder.  · If you use birth control pills and have frequent bladder infections, discuss it with your doctor.  Follow-up care  Call your healthcare provider if all symptoms are not gone after 3 days of treatment. This is especially important if you have repeat infections.  If a culture was done, you will be told if your treatment needs to be changed. If directed, you can call to find out the results.  If X-rays were done, you will be told if the results will affect your treatment.  Call 911  Call 911 if any of the following occur:  · Trouble breathing  · Hard to wake up or confusion  · Fainting or loss of consciousness  · Rapid heart rate  When to seek medical advice  Call your healthcare provider right away if any of these occur:  · Fever of 100.4ºF (38.0ºC) or higher, or as directed by your healthcare provider  · Symptoms are not better by the third day of treatment  · Back or belly (abdominal) pain that gets worse  · Repeated vomiting, or unable to keep medicine down  · Weakness or dizziness  · Vaginal discharge  · Pain, redness, or swelling in the outer vaginal area (labia)  Date Last Reviewed:  10/1/2016  © 5476-9299 [employer]. [address] [place] [address], [place] [address]. All rights reserved. This information is not intended as a substitute for professional medical care. Always follow your healthcare professional's instructions.

## 2020-07-19 NOTE — TELEPHONE ENCOUNTER
Reason for Disposition   [1] SEVERE pain with urination  (e.g., excruciating) AND [2] not improved after 2 hours of pain medicine and Sitz bath    Additional Information   Negative: Shock suspected (e.g., cold/pale/clammy skin, too weak to stand, low BP, rapid pulse)   Negative: Sounds like a life-threatening emergency to the triager   Negative: [1] Unable to urinate (or only a few drops) > 4 hours AND [2] bladder feels very full (e.g., palpable bladder or strong urge to urinate)   Negative: Vomiting   Negative: Patient sounds very sick or weak to the triager    Protocols used: URINATION PAIN - FEMALE-A-AH    Pt stated she has pain when she urinates. Taking AZO for 2 days with no relief. Per triage protcol, advised to see a Phy[facility]ian within 4 hrs. Pt stated if Provider on call calls in antibiotics, she has no way to get to the Pharmacy. Call placed to [facility] hr [facility] on [facility]. Spoke with Marilyn, she stated they cannot deliver medication the same day. Pt advised to ask a family member or friend to take her to an Urgent Care to see a Provider to give a urine sample, and to take her to Pharmacy if medication is prescribed. Pt stated she would go to Urgent Care [facility] [address], and the Pharmacy if she can catch a cab or get someone to bring her. Stated if she cannot get a ride she will have to wait until tomorrow. Advised a message will be sent to the PCP's office.

## 2020-07-19 NOTE — PATIENT INSTRUCTIONS
"PLEASE READ YOUR DISCHARGE INSTRUCTIONS ENTIRELY AS IT CONTAINS IMPORTANT INFORMATION.      Take the antibiotics to completion.     Drink plenty of fluids, wipe front to back, take showers not baths, no scented soaps, wear breathable cotton underwear, urinate after sexual intercourse.     A urine culture was sent. You will be contacted once it results and appropriate action will be taken if needed.       Please go to the ER for worsening symptoms including fever, worsening flank pain, vomiting, etc.       Please return or see your primary care doctor if you develop new or worsening symptoms.     Please arrange follow up with your primary medical clinic as soon as possible. You must understand that you've received an Urgent Care treatment only and that you may be released before all of your medical problems are known or treated. You, the patient, will arrange for follow up as instructed. If your symptoms worsen or fail to improve you should go to the Emergency Room.  WE CANNOT RULE OUT ALL POSSIBLE CAUSES OF YOUR SYMPTOMS IN THE URGENT CARE SETTING PLEASE GO TO THE ER IF YOU FEELS YOUR CONDITION IS WORSENING OR YOU WOULD LIKE EMERGENT EVALUATION.      Bladder Infection, Female (Adult)    Urine is normally doesn't have any bacteria in it. But bacteria can get into the urinary tract from the skin around the rectum. Or they can travel in the blood from elsewhere in the body. Once they are in your urinary tract, they can cause infection in the urethra (urethritis), the bladder (cystitis), or the kidneys (pyelonephritis).  The most common place for an infection is in the bladder. This is called a bladder infection. This is one of the most common infections in women. Most bladder infections are easily treated. They are not serious unless the infection spreads to the kidney.  The phrases "bladder infection," "UTI," and "cystitis" are often used to describe the same thing. But they are not always the same. Cystitis is an " inflammation of the bladder. The most common cause of cystitis is an infection.  Symptoms  The infection causes inflammation in the urethra and bladder. This causes many of the symptoms. The most common symptoms of a bladder infection are:  · Pain or burning when urinating  · Having to urinate more often than usual  · Urgent need to urinate  · Only a small amount of urine comes out  · Blood in urine  · Abdominal discomfort. This is usually in the lower abdomen above the pubic bone.  · Cloudy urine  · Strong- or bad-smelling urine  · Unable to urinate (urinary retention)  · Unable to hold urine in (urinary incontinence)  · Fever  · Loss of appetite  · Confusion (in older adults)  Causes  Bladder infections are not contagious. You can't get one from someone else, from a toilet seat, or from sharing a bath.  The most common cause of bladder infections is bacteria from the bowels. The bacteria get onto the skin around the opening of the urethra. From there, they can get into the urine and travel up to the bladder, causing inflammation and infection. This usually happens because of:  · Wiping improperly after urinating. Always wipe from front to back.  · Bowel incontinence  · Pregnancy  · Procedures such as having a catheter inserted  · Older age  · Not emptying your bladder. This can allow bacteria a chance to grow in your urine.  · Dehydration  · Constipation  · Sex  · Use of a diaphragm for birth control   Treatment  Bladder infections are diagnosed by a urine test. They are treated with antibiotics and usually clear up quickly without complications. Treatment helps prevent a more serious kidney infection.  Medicines  Medicines can help in the treatment of a bladder infection:  · Take antibiotics until they are used up, even if you feel better. It is important to finish them to make sure the infection has cleared.  · You can use acetaminophen or ibuprofen for pain, fever, or discomfort, unless another medicine was  prescribed. If you have chronic liver or kidney disease, talk with your healthcare provider before using these medicines. Also talk with your provider if you've ever had a stomach ulcer or gastrointestinal bleeding, or are taking blood-thinner medicines.  · If you are given phenazopydridine to reduce burning with urination, it will cause your urine to become a bright orange color. This can stain clothing.  Care and prevention  These self-care steps can help prevent future infections:  · Drink plenty of fluids to prevent dehydration and flush out your bladder. Do this unless you must restrict fluids for other health reasons, or your doctor told you not to.  · Proper cleaning after going to the bathroom is important. Wipe from front to back after using the toilet to prevent the spread of bacteria.  · Urinate more often. Don't try to hold urine in for a long time.  · Wear loose-fitting clothes and cotton underwear. Avoid tight-fitting pants.  · Improve your diet and prevent constipation. Eat more fresh fruit and vegetables, and fiber, and less junk and fatty foods.  · Avoid sex until your symptoms are gone.  · Avoid caffeine, alcohol, and spicy foods. These can irritate your bladder.  · Urinate right after intercourse to flush out your bladder.  · If you use birth control pills and have frequent bladder infections, discuss it with your doctor.  Follow-up care  Call your healthcare provider if all symptoms are not gone after 3 days of treatment. This is especially important if you have repeat infections.  If a culture was done, you will be told if your treatment needs to be changed. If directed, you can call to find out the results.  If X-rays were done, you will be told if the results will affect your treatment.  Call 911  Call 911 if any of the following occur:  · Trouble breathing  · Hard to wake up or confusion  · Fainting or loss of consciousness  · Rapid heart rate  When to seek medical advice  Call your  healthcare provider right away if any of these occur:  · Fever of 100.4ºF (38.0ºC) or higher, or as directed by your healthcare provider  · Symptoms are not better by the third day of treatment  · Back or belly (abdominal) pain that gets worse  · Repeated vomiting, or unable to keep medicine down  · Weakness or dizziness  · Vaginal discharge  · Pain, redness, or swelling in the outer vaginal area (labia)  Date Last Reviewed: 10/1/2016  © 1700-2159 [employer]. [address] [place] [address]. All rights reserved. This information is not intended as a substitute for professional medical care. Always follow your healthcare professional's instructions.

## 2020-07-20 ENCOUNTER — LAB VISIT (OUTPATIENT)
Dept: LAB | Facility: HOSPITAL | Age: 85
End: 2020-07-20
Attending: INTERNAL MEDICINE
Payer: MEDICARE

## 2020-07-20 ENCOUNTER — CLINICAL SUPPORT (OUTPATIENT)
Dept: INTERNAL MEDICINE | Facility: CLINIC | Age: 85
End: 2020-07-20
Payer: MEDICARE

## 2020-07-20 ENCOUNTER — ANTI-COAG VISIT (OUTPATIENT)
Dept: CARDIOLOGY | Facility: CLINIC | Age: 85
End: 2020-07-20
Payer: MEDICARE

## 2020-07-20 DIAGNOSIS — Z79.01 CURRENT USE OF LONG TERM ANTICOAGULATION: ICD-10-CM

## 2020-07-20 DIAGNOSIS — I48.20 CHRONIC ATRIAL FIBRILLATION: ICD-10-CM

## 2020-07-20 DIAGNOSIS — J30.9 CHRONIC ALLERGIC RHINITIS: ICD-10-CM

## 2020-07-20 LAB
INR PPP: 2.9 (ref 0.8–1.2)
PROTHROMBIN TIME: 30.3 SEC (ref 9–12.5)

## 2020-07-20 PROCEDURE — 95115 IMMUNOTHERAPY ONE INJECTION: CPT | Mod: HCNC,S$GLB,, | Performed by: FAMILY MEDICINE

## 2020-07-20 PROCEDURE — 93793 ANTICOAG MGMT PT WARFARIN: CPT | Mod: S$GLB,,,

## 2020-07-20 PROCEDURE — 93793 PR ANTICOAGULANT MGMT FOR PT TAKING WARFARIN: ICD-10-PCS | Mod: S$GLB,,,

## 2020-07-20 PROCEDURE — 85610 PROTHROMBIN TIME: CPT | Mod: HCNC

## 2020-07-20 PROCEDURE — 95115 PR IMMUNOTHERAPY, ONE INJECTION: ICD-10-PCS | Mod: HCNC,S$GLB,, | Performed by: FAMILY MEDICINE

## 2020-07-20 PROCEDURE — 99499 NO LOS: ICD-10-PCS | Mod: HCNC,S$GLB,, | Performed by: ALLERGY & IMMUNOLOGY

## 2020-07-20 PROCEDURE — 36415 COLL VENOUS BLD VENIPUNCTURE: CPT | Mod: HCNC,PO

## 2020-07-20 PROCEDURE — 99499 UNLISTED E&M SERVICE: CPT | Mod: HCNC,S$GLB,, | Performed by: ALLERGY & IMMUNOLOGY

## 2020-07-20 NOTE — PROGRESS NOTES
INR at goal. Medications and chart reviewed. Seen in urgent care 7/19 for UTI, prescribed augmentin. No DDI expected. Watchman scheduled for 8/5. No changed noted to necessitate adjustment of warfarin or follow-up plan. See calendar.

## 2020-07-21 LAB — BACTERIA UR CULT: ABNORMAL

## 2020-07-22 ENCOUNTER — TELEPHONE (OUTPATIENT)
Dept: URGENT CARE | Facility: CLINIC | Age: 85
End: 2020-07-22

## 2020-07-22 NOTE — TELEPHONE ENCOUNTER
Discussed positive urine culture with patient. She states she is doing better since her visit but still having some symptoms. Denies and back pain, fever or chills.

## 2020-07-23 NOTE — PROGRESS NOTES
Outpatient Care Management  Plan of Care Follow Up Visit    Patient: Jenniffer Jimenez  MRN: [id number]  Date of Service: 07/15/2020  Completed by: Brisa Al RN  Referral Date: 06/26/2020  Program: Case Management (High Risk)    Reason for Visit   Patient presents with    OPCM RN First Follow-Up Attempt     7/16/20    Update Plan Of Care     7/22/20       Brief Summary: Completed follow-up call for OPCM. Patient feeling okay today. She states that her BP has been up and down. Today it was 153/62. Her weight was up as well at 172. She gained 4 lbs and contacted her MD and was instructed to take an additional dose of Lasix. She reports that she has not been eating any processed foods. But she has been receiving meals from [employer] and she states that it consists mostly of foods like gumbo, red beans and sausage along with a small salad. She tries to limit it and states that sometimes she eats the one meal delivered over the course of the entire day. Educated patient on importance of choosing low sodium foods. Went over additional choices. She states that she is still having financial issues and does not want to waste the food that is delivered. She continues to check BP and weight daily and has started keeping a log for MD appts. She reports that she recently had to go to Urgent care for UTI symptoms and was prescribed an antibiotic. She has started taking it. Reminded patient of upcoming appointments, verbalized awareness. Discussed completing follow up call with patient, who is in agreement with call in 2 weeks.        Patient Summary     Involvement of Care:  Do I have permission to speak with other family members about your care?  Yes  Paul Duenas [facility] Power of      [phone]        Patient Reported Labs & Vitals:  1.  Any Patient Reported Labs & Vitals?  Yes  2.  Patient Reported Blood Pressure:  153/62  3.  Patient Reported Pulse:     4.  Patient Reported Weight (Kg):  172  5.   Patient Reported Blood Glucose (mg/dl):       Medical and social history was reviewed with patient and/or caregiver.     Clinical Assessment     Reviewed and provided basic information on available community resources for mental health, transportation, wellness resources, and palliative care programs with patient and/or caregiver.     Complex Care Plan     Care plan was discussed and completed today with input from patient and/or caregiver.    Patient Instructions     Instructions were provided via the [employer] [place] patient [employer] and are available for the patient to view on the patient portal.     Clinical Reference Documents Added to Patient Instructions       Document    URINARY TRACT INFECTIONS IN WOMEN (ENGLISH)             Follow up in about 23 days (around 8/7/2020) for OPCM.    Todays OPCM Self-Management Care Plan was developed with the patients/caregivers input and was based on identified barriers from todays assessment.  Goals were written today with the patient/caregiver and the patient has agreed to work towards these goals to improve his/her overall well-being. Patient verbalized understanding of the care plan, goals, and all of today's instructions. Encouraged patient/caregiver to communicate with his/her physician and health care team about health conditions and the treatment plan.  Provided my contact information today and encouraged patient/caregiver to call me with any questions as needed.

## 2020-07-30 ENCOUNTER — ANTI-COAG VISIT (OUTPATIENT)
Dept: CARDIOLOGY | Facility: CLINIC | Age: 85
End: 2020-07-30
Payer: MEDICARE

## 2020-07-30 ENCOUNTER — LAB VISIT (OUTPATIENT)
Dept: LAB | Facility: HOSPITAL | Age: 85
End: 2020-07-30
Payer: MEDICARE

## 2020-07-30 ENCOUNTER — OFFICE VISIT (OUTPATIENT)
Dept: NEUROLOGY | Facility: CLINIC | Age: 85
End: 2020-07-30
Payer: MEDICARE

## 2020-07-30 VITALS
HEIGHT: 66 IN | SYSTOLIC BLOOD PRESSURE: 126 MMHG | WEIGHT: 165 LBS | BODY MASS INDEX: 26.52 KG/M2 | DIASTOLIC BLOOD PRESSURE: 71 MMHG | HEART RATE: 64 BPM

## 2020-07-30 DIAGNOSIS — I48.20 CHRONIC ATRIAL FIBRILLATION: ICD-10-CM

## 2020-07-30 DIAGNOSIS — Z79.01 CURRENT USE OF LONG TERM ANTICOAGULATION: ICD-10-CM

## 2020-07-30 DIAGNOSIS — R26.81 GAIT INSTABILITY: ICD-10-CM

## 2020-07-30 DIAGNOSIS — H34.8322 STABLE BRANCH RETINAL VEIN OCCLUSION OF LEFT EYE: Primary | ICD-10-CM

## 2020-07-30 LAB
INR PPP: 2.4 (ref 0.8–1.2)
PROTHROMBIN TIME: 25.3 SEC (ref 9–12.5)

## 2020-07-30 PROCEDURE — 93793 ANTICOAG MGMT PT WARFARIN: CPT | Mod: S$GLB,,,

## 2020-07-30 PROCEDURE — 1126F PR PAIN SEVERITY QUANTIFIED, NO PAIN PRESENT: ICD-10-PCS | Mod: HCNC,S$GLB,, | Performed by: PSYCHIATRY & NEUROLOGY

## 2020-07-30 PROCEDURE — 99215 OFFICE O/P EST HI 40 MIN: CPT | Mod: HCNC,S$GLB,, | Performed by: PSYCHIATRY & NEUROLOGY

## 2020-07-30 PROCEDURE — 99999 PR PBB SHADOW E&M-EST. PATIENT-LVL III: CPT | Mod: PBBFAC,HCNC,, | Performed by: PSYCHIATRY & NEUROLOGY

## 2020-07-30 PROCEDURE — 99999 PR PBB SHADOW E&M-EST. PATIENT-LVL III: ICD-10-PCS | Mod: PBBFAC,HCNC,, | Performed by: PSYCHIATRY & NEUROLOGY

## 2020-07-30 PROCEDURE — 1101F PR PT FALLS ASSESS DOC 0-1 FALLS W/OUT INJ PAST YR: ICD-10-PCS | Mod: HCNC,CPTII,S$GLB, | Performed by: PSYCHIATRY & NEUROLOGY

## 2020-07-30 PROCEDURE — 1159F PR MEDICATION LIST DOCUMENTED IN MEDICAL RECORD: ICD-10-PCS | Mod: HCNC,S$GLB,, | Performed by: PSYCHIATRY & NEUROLOGY

## 2020-07-30 PROCEDURE — 1126F AMNT PAIN NOTED NONE PRSNT: CPT | Mod: HCNC,S$GLB,, | Performed by: PSYCHIATRY & NEUROLOGY

## 2020-07-30 PROCEDURE — 1101F PT FALLS ASSESS-DOCD LE1/YR: CPT | Mod: HCNC,CPTII,S$GLB, | Performed by: PSYCHIATRY & NEUROLOGY

## 2020-07-30 PROCEDURE — 36415 COLL VENOUS BLD VENIPUNCTURE: CPT | Mod: HCNC

## 2020-07-30 PROCEDURE — 85610 PROTHROMBIN TIME: CPT | Mod: HCNC

## 2020-07-30 PROCEDURE — 93793 PR ANTICOAGULANT MGMT FOR PT TAKING WARFARIN: ICD-10-PCS | Mod: S$GLB,,,

## 2020-07-30 PROCEDURE — 99215 PR OFFICE/OUTPT VISIT, EST, LEVL V, 40-54 MIN: ICD-10-PCS | Mod: HCNC,S$GLB,, | Performed by: PSYCHIATRY & NEUROLOGY

## 2020-07-30 PROCEDURE — 1159F MED LIST DOCD IN RCRD: CPT | Mod: HCNC,S$GLB,, | Performed by: PSYCHIATRY & NEUROLOGY

## 2020-07-30 NOTE — PROGRESS NOTES
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 Continue warfarin (target INR 2-3) for now; WATCHMAN planned  2. Continue pravastatin (atorvastatin intolerant)  3. F/U with ophthalmology (Dr. Hernandez) as scheduled  4. Hydration discussed  5. RTC prn    Problem List Items Addressed This Visit        Unprioritized    Gait instability    Current use of long term anticoagulation    BRVO (branch retinal vein occlusion) - Primary          CC:  F/u BRVO    HPI:  89 y/o WF here for hospital f/u.  See history below.  Since discharge, no recurrent visual disturbance but still with photopsias.  Tolerating warfarin and scheduled for Watchman next week.   Uses walker when leaves the home.       89 y/o female with history of Afib (warfarin), CHF, CKD III, CAD, HLD, HTN, prior TIA presents from the Optometry clinic for branch retinal vein occlusion.  Patient initially presented with complaints of sudden vision loss lasting about 1 minute.  Her vision returned but now she is seeing colors and flashing lights, along with swirling circles.  Patient reports associated eye pain, HA, and Jaw pain/fullness.  Patient had a prior history of BRVO in 2002.  Patient has significant eye disease history.       Of note, patient has been subtherapeutic on warfarin but reports taking her medication without missing doses.       Hospital Course (synopsis of major diagnoses, care, treatment, and services provided during the course of the hospital stay):         89 y/o female with history of Afib (warfarin), CHF, CKD III, CAD, HLD, HTN, prior TIA now with isolated BRAO vs GCA.  Patient with constellation of symptoms concerning for GCA on admission- jaw pain/fullness. MRI/MRA unremarkable.Sed rate and CRP unremarkable. Patient denies scalp tenderness or headache. Vision intact. TCD with normal temporal arteries. GSC unlikely, prednisone discontinued. 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 Patient to continue increased dose of  Coumadin (5mg) and follow up in Coumadin clinic. Will continue pravastatin 40 mg, reports intolerance of atorvastatin. Work up complete, patient to discharge home today and follow up in stroke clinic in 4-6 weeks.        Past Medical History:   Diagnosis Date    Amblyopia of left eye 4/10/2013    Anticoagulant long-term use     Coumadin - A-Fib    Anxiety     Arthritis     Facet arthropathy, Lumbosacral    Atherosclerosis of aorta     Atrial fibrillation     Auricular fibrillation     Central retinal vein occlusion of left eye     CHF (congestive heart failure)     Chronic kidney disease, stage 3     Coronary artery disease     Depression     Diastolic heart failure 2014    Exotropia of both eyes 2013    recession RSR 5.0mm w/ adj; recession LR os 5.0 w/ adj; resect MR os  4.0mm    Hearing loss     History of resection of small bowel     Hyperlipidemia     Hypertension     Hypertensive retinopathy of both eyes     Hypoglycemia     Macular degeneration     Meniere's disease of both ears     OA (osteoarthritis) of shoulder     Right    Osteoporosis     Other and unspecified hyperlipidemia     Posterior vitreous detachment of both eyes     Rhinitis     TIA (transient ischemic attack)       Past Surgical History:   Procedure Laterality Date    APPENDECTOMY      CARDIAC CATHETERIZATION      CATARACT EXTRACTION W/  INTRAOCULAR LENS IMPLANT Bilateral      SECTION, CLASSIC      HYSTERECTOMY      INNER EAR SURGERY      JOINT REPLACEMENT      LEFT KNEE REPLACEMENT IN  -    OOPHORECTOMY      SINUS SURGERY      STRABISMUS SURGERY  13    RSR recession 5 mm, LLR recession 5 mm and LMR resection 4mm    STRABISMUS SURGERY  2014    recess LR OD 6mm    TONSILLECTOMY        Outpatient Medications Marked as Taking for the 20 encounter (Office Visit) with José Miguel Varela MD   Medication Sig Dispense Refill    aspirin (ECOTRIN) 81 MG EC tablet Take 1 tablet  "(81 mg total) by mouth once daily. 360 tablet 0    cholecalciferol, vitamin D3, (VITAMIN D3 ORAL) Take 1 tablet by mouth once daily.      diclofenac sodium (VOLTAREN) 1 % Gel Apply 2 g topically 3 (three) times daily. Apply to ankle for pain 1 Tube 0    fluticasone propionate (FLONASE) 50 mcg/actuation nasal spray USE 1 SPRAY IN EACH NOSTRIL ONE TIME DAILY 32 g 11    furosemide (LASIX) 20 MG tablet TAKE 1 TABLET BY MOUTH DAILY; MAY TAKE AN ADDITIONAL TABLET AS NEEDED FOR SWELLING. 100 tablet 3    multivitamin (ONE DAILY MULTIVITAMIN) per tablet Take 1 tablet by mouth once daily.      mupirocin (BACTROBAN) 2 % ointment Apply to affected area 3 times daily 22 g 1    peg 400-propylene glycol (SYSTANE) 0.4-0.3 % Drop Place 1-2 drops into both eyes as needed.       potassium chloride (MICRO-K) 10 MEQ CpSR TAKE 1 CAPSULE EVERY DAY 90 capsule 3    pravastatin (PRAVACHOL) 40 MG tablet Take 1 tablet (40 mg total) by mouth 2 (two) times daily. (Patient taking differently: Take 40 mg by mouth once daily. ) 180 tablet 3    triamcinolone (NASACORT) 55 mcg nasal inhaler 2 sprays by Nasal route once daily. 17 g 0    warfarin (COUMADIN) 5 MG tablet Take 1 tablet (5 mg total) by mouth Daily. 30 tablet 11      Review of patient's allergies indicates:   Allergen Reactions    Bactrim [sulfamethoxazole-trimethoprim] Other (See Comments)     "Couldn't walk"    Opioids - morphine analogues Other (See Comments)     Bowel issues; bowel obstruction    Tizanidine Other (See Comments)     "Lips were numb,  Almost passed out."    Tramadol Hallucinations    Beta-blockers (beta-adrenergic blocking agts) Other (See Comments)     Can not go on beta blockers for long period of time - due to taking allergy injections    Phenergan [promethazine] Other (See Comments)     Per pt. request- saw sisters have bad reaction to drug      Family History   Problem Relation Age of Onset    Hypertension Mother     Hypertension Father     Liver " disease Sister     Diabetes Sister     Heart disease Sister     Diabetes Brother     Breast cancer Maternal Aunt     No Known Problems Maternal Uncle     No Known Problems Paternal Aunt     No Known Problems Paternal Uncle     No Known Problems Maternal Grandmother     No Known Problems Maternal Grandfather     No Known Problems Paternal Grandmother     No Known Problems Paternal Grandfather     No Known Problems Daughter     No Known Problems Son     Heart disease Sister     No Known Problems Son     No Known Problems Son     Cancer Neg Hx         in first degree relatives    Melanoma Neg Hx     Psoriasis Neg Hx     Lupus Neg Hx     Amblyopia Neg Hx     Blindness Neg Hx     Cataracts Neg Hx     Glaucoma Neg Hx     Macular degeneration Neg Hx     Retinal detachment Neg Hx     Strabismus Neg Hx     Stroke Neg Hx     Thyroid disease Neg Hx       Social History     Socioeconomic History    Marital status:      Spouse name: Not on file    Number of children: Not on file    Years of education: Not on file    Highest education level: Not on file   Occupational History    Not on file   Social Needs    Financial resource strain: Not very hard    Food insecurity     Worry: Sometimes true     Inability: Sometimes true    Transportation needs     Medical: Yes     Non-medical: Yes   Tobacco Use    Smoking status: Former Smoker     Types: Cigarettes     Quit date: 10/29/1982     Years since quittin.7    Smokeless tobacco: Never Used   Substance and Sexual Activity    Alcohol use: Yes     Alcohol/week: 0.0 standard drinks     Comment: socially    Drug use: No    Sexual activity: Never   Lifestyle    Physical activity     Days per week: Not on file     Minutes per session: Not on file    Stress: Not on file   Relationships    Social connections     Talks on phone: Not on file     Gets together: Not on file     Attends Buddhist service: Not on file     Active member of club  "or organization: Not on file     Attends meetings of clubs or organizations: Not on file     Relationship status: Not on file   Other Topics Concern    Patient feels they ought to cut down on drinking/drug use Not Asked    Patient annoyed by others criticizing their drinking/drug use Not Asked    Patient has felt bad or guilty about drinking/drug use Not Asked    Patient has had a drink/used drugs as an eye opener in the AM Not Asked    Are you pregnant or think you may be? No    Breast-feeding No   Social History Narrative    Not on file     Review Of Systems:  General: Negative for fever   HENT: Negative for tinnitus, nose bleeds, neck stiffness   Cardiac Negative for palpitations   Vascular: Negative for easy bruising   Pulmonary: Negative for cough   Gastrointestinal: Negative for constipation   Urinary: Negative for incomplete bladder emptying   Musculoskeletal: Negative for muscle aches   Neurological: As above. Otherwise negative for abnormal movements   Psychiatric:  Negative for depression       /71   Pulse 64   Ht 5' 6" (1.676 m)   Wt 74.8 kg (165 lb)   LMP  (LMP Unknown)   BMI 26.63 kg/m²    Well developed, well nourished female  Extremities: no edema    Mental status:   Awake, alert and appropriately oriented   Normal recent and remote memory   Normal attention and concentration   Normal speech and language   Normal fund of knowledge   No extinction  Cranial nerves:   EOMF without nystagmus   VF - L eye inferotemporal scotoma   Normal facial sensation   Normal facial movements   Decreased hearing bilaterally (+ aids)  Motor:   No pronator drift   Normal FF movements bilaterally   Normal muscle tone, bulk and power (no cogwheeling)   No abnormal movements  Sensory   Intact to LT  DTRs   NT  Coordination   Intact to FNF and HTS  Gait   Mild unsteadiness - drifted to right    Data Reviewed:  Lab Results   Component Value Date    INR 2.4 (H) 07/30/2020    INR 2.9 (H) 07/20/2020    INR 2.6 " (H) 07/07/2020       José Miguel Varela MD

## 2020-07-31 NOTE — PROGRESS NOTES
INR at goal. Medications and chart reviewed. Having watchman procedure on 8/5. Continue dose per calendar. F/u procedure INR on 8/5.

## 2020-07-31 NOTE — PATIENT INSTRUCTIONS

## 2020-08-03 ENCOUNTER — LAB VISIT (OUTPATIENT)
Dept: SURGERY | Facility: CLINIC | Age: 85
DRG: 309 | End: 2020-08-03
Payer: MEDICARE

## 2020-08-03 DIAGNOSIS — I48.91 ATRIAL FIBRILLATION: ICD-10-CM

## 2020-08-03 LAB — SARS-COV-2 RNA RESP QL NAA+PROBE: NOT DETECTED

## 2020-08-03 PROCEDURE — U0003 INFECTIOUS AGENT DETECTION BY NUCLEIC ACID (DNA OR RNA); SEVERE ACUTE RESPIRATORY SYNDROME CORONAVIRUS 2 (SARS-COV-2) (CORONAVIRUS DISEASE [COVID-19]), AMPLIFIED PROBE TECHNIQUE, MAKING USE OF HIGH THROUGHPUT TECHNOLOGIES AS DESCRIBED BY CMS-2020-01-R: HCPCS | Mod: HCNC

## 2020-08-04 ENCOUNTER — ANESTHESIA EVENT (OUTPATIENT)
Dept: MEDSURG UNIT | Facility: HOSPITAL | Age: 85
DRG: 309 | End: 2020-08-04
Payer: MEDICARE

## 2020-08-04 NOTE — ANESTHESIA PREPROCEDURE EVALUATION
"[facility]  Anesthesia Pre-Operative Evaluation         Patient Name: Jenniffer Jimenez  YOB: 1932  MRN: [id number]    SUBJECTIVE:     Pre-operative evaluation for Procedure(s) (LRB):  Left atrial appendage closure device (N/A)     08/04/2020    Jenniffer Jimenez is a 88 y.o. female w/ a significant PMHx of of Afib (warfarin), CHF, CKD III, CAD, HLD, HTN, prior TIA.    Patient now presents for the above procedure(s).      LDA: None documented    Prev airway: None documented.    Drips: None documented.      Patient Active Problem List   Diagnosis    Pure hypercholesterolemia    Pseudophakia, both eyes    Stable central retinal vein occlusion of left eye    Chronic rhinitis    Hearing loss, sensorineural    Hypertension    Arthritis    Exotropia of both eyes    Gait instability    Meniere's disease    Facet arthropathy, lumbosacral    Lumbar spinal stenosis    Chronic diastolic heart failure    Hypermetropia of right eye    Chronic atrial fibrillation    Chronic kidney disease, stage III (moderate)    Atherosclerosis of aorta    Left ventricular diastolic dysfunction with preserved systolic function    History of TIA (transient ischemic attack)    Osteoporosis    Primary osteoarthritis of right shoulder    History of strabismus surgery    Current use of long term anticoagulation    Prediabetes    Mass on back    Refractive error    Posterior vitreous detachment, bilateral    Dry eye syndrome    Overweight (BMI 25.0-29.9)    Risk for falls    OAB (overactive bladder)    Recurrent UTI    Venous insufficiency of both lower extremities    BRVO (branch retinal vein occlusion)       Review of patient's allergies indicates:   Allergen Reactions    Bactrim [sulfamethoxazole-trimethoprim] Other (See Comments)     "Couldn't walk"    Opioids - morphine analogues Other (See Comments)     Bowel issues; bowel obstruction    Tizanidine Other (See Comments)     " ""Lips were numb,  Almost passed out."    Tramadol Hallucinations    Beta-blockers (beta-adrenergic blocking agts) Other (See Comments)     Can not go on beta blockers for long period of time - due to taking allergy injections    Phenergan [promethazine] Other (See Comments)     Per pt. request- saw sisters have bad reaction to drug       Current Inpatient Medications:      No current facility-administered medications on file prior to encounter.      Current Outpatient Medications on File Prior to Encounter   Medication Sig Dispense Refill    aspirin (ECOTRIN) 81 MG EC tablet Take 1 tablet (81 mg total) by mouth once daily. 360 tablet 0    cholecalciferol, vitamin D3, (VITAMIN D3 ORAL) Take 1 tablet by mouth once daily.      diclofenac sodium (VOLTAREN) 1 % Gel Apply 2 g topically 3 (three) times daily. Apply to ankle for pain 1 Tube 0    fluticasone propionate (FLONASE) 50 mcg/actuation nasal spray USE 1 SPRAY IN EACH NOSTRIL ONE TIME DAILY 32 g 11    furosemide (LASIX) 20 MG tablet TAKE 1 TABLET BY MOUTH DAILY; MAY TAKE AN ADDITIONAL TABLET AS NEEDED FOR SWELLING. 100 tablet 3    multivitamin (ONE DAILY MULTIVITAMIN) per tablet Take 1 tablet by mouth once daily.      mupirocin (BACTROBAN) 2 % ointment Apply to affected area 3 times daily 22 g 1    peg 400-propylene glycol (SYSTANE) 0.4-0.3 % Drop Place 1-2 drops into both eyes as needed.       potassium chloride (MICRO-K) 10 MEQ CpSR TAKE 1 CAPSULE EVERY DAY 90 capsule 3    pravastatin (PRAVACHOL) 40 MG tablet Take 1 tablet (40 mg total) by mouth 2 (two) times daily. (Patient taking differently: Take 40 mg by mouth once daily. ) 180 tablet 3    triamcinolone (NASACORT) 55 mcg nasal inhaler 2 sprays by Nasal route once daily. 17 g 0    warfarin (COUMADIN) 5 MG tablet Take 1 tablet (5 mg total) by mouth Daily. 30 tablet 11       Past Surgical History:   Procedure Laterality Date    APPENDECTOMY      CARDIAC CATHETERIZATION      CATARACT EXTRACTION " W/  INTRAOCULAR LENS IMPLANT Bilateral      SECTION, CLASSIC      HYSTERECTOMY      INNER EAR SURGERY      JOINT REPLACEMENT      LEFT KNEE REPLACEMENT IN 2013 -    OOPHORECTOMY      SINUS SURGERY      STRABISMUS SURGERY  13    RSR recession 5 mm, LLR recession 5 mm and LMR resection 4mm    STRABISMUS SURGERY  2014    recess LR OD 6mm    TONSILLECTOMY         Social History     Socioeconomic History    Marital status:      Spouse name: Not on file    Number of children: Not on file    Years of education: Not on file    Highest education level: Not on file   Occupational History    Not on file   Social Needs    Financial resource strain: Not very hard    Food insecurity     Worry: Sometimes true     Inability: Sometimes true    Transportation needs     Medical: Yes     Non-medical: Yes   Tobacco Use    Smoking status: Former Smoker     Types: Cigarettes     Quit date: 10/29/1982     Years since quittin.7    Smokeless tobacco: Never Used   Substance and Sexual Activity    Alcohol use: Yes     Alcohol/week: 0.0 standard drinks     Comment: socially    Drug use: No    Sexual activity: Never   Lifestyle    Physical activity     Days per week: Not on file     Minutes per session: Not on file    Stress: Not on file   Relationships    Social connections     Talks on phone: Not on file     Gets together: Not on file     Attends Moravian service: Not on file     Active member of club or organization: Not on file     Attends meetings of clubs or organizations: Not on file     Relationship status: Not on file   Other Topics Concern    Patient feels they ought to cut down on drinking/drug use Not Asked    Patient annoyed by others criticizing their drinking/drug use Not Asked    Patient has felt bad or guilty about drinking/drug use Not Asked    Patient has had a drink/used drugs as an eye opener in the AM Not Asked    Are you pregnant or think you may be? No     Breast-feeding No   Social History Narrative    Not on file       OBJECTIVE:     Vital Signs Range (Last 24H):         Significant Labs:  Lab Results   Component Value Date    WBC 2.69 (L) 06/26/2020    HGB 10.7 (L) 06/26/2020    HCT 32.5 (L) 06/26/2020     06/26/2020    CHOL 206 (H) 06/25/2020    TRIG 89 06/25/2020    HDL 74 06/25/2020    ALT 12 06/26/2020    AST 18 06/26/2020     06/26/2020    K 3.7 06/26/2020     06/26/2020    CREATININE 0.8 06/26/2020    BUN 14 06/26/2020    CO2 26 06/26/2020    TSH 2.834 06/25/2020    INR 2.4 (H) 07/30/2020    GLUF 101 08/14/2007    HGBA1C 5.6 06/25/2020       Diagnostic Studies: No relevant studies.    EKG:   Results for orders placed or performed during the hospital encounter of 06/25/20   EKG 12-lead    Collection Time: 06/25/20  5:28 PM    Narrative    Test Reason : G45.9,    Vent. Rate : 074 BPM     Atrial Rate : 375 BPM     P-R Int : 000 ms          QRS Dur : 070 ms      QT Int : 386 ms       P-R-T Axes : 000 079 022 degrees     QTc Int : 428 ms    Atrial fibrillation  Nonspecific T wave abnormality  Abnormal ECG  When compared with ECG of 16-JUN-2020 07:59,  Nonspecific T wave abnormality, worse in Inferior leads  Confirmed by Hao Cortes MD ([id number]) on 6/26/2020 10:04:52 AM    Referred By: RUBA   SELF           Confirmed By:Hao Cortes MD       ECHOCARDIOGRAM:  TTE:  No results found. However, due to the size of the patient record, not all encounters were searched. Please check Results Review for a complete set of results.    ASSESSMENT/PLAN:         Anesthesia Evaluation    I have reviewed the Patient Summary Reports.    I have reviewed the Nursing Notes.    I have reviewed the Medications.     Review of Systems  Anesthesia Hx:  History of prior surgery of interest to airway management or planning: Previous anesthesia: General 4/22/13 left TKA with general anesthesia.  Denies Family Hx of Anesthesia complications.   Denies Personal Hx of  Anesthesia complications.   Social:  Denies Tobacco Use. Denies Alcohol Use.  Denies Illicit Drug Use.  Hematology/Oncology:  Hematology Normal   Oncology Normal     EENT/Dental:   Caps upper and lower Eyes: Eye Symptoms: of dry eyes. Eye Disease: Strabismus:  Macular degeneration  Ears General/Symptom(s) Hearing Impairment: deaf - left menieres disease   Cardiovascular:   Exercise tolerance: good Hypertension CAD   CHF  Coronary Artery Disease: (diastolic dysfunction)  Hypertension    Pulmonary:   Denies Shortness of breath.  Pulmonary Symptoms: (chest congestion nasal drip and productive cough )  are currently symptomatic and productive cough.    Renal/:   Chronic Renal Disease, CRI    Hepatic/GI:   GERD (no longer on meds. did not like the way it made her feel)    Musculoskeletal:   Arthritis   Musculoskeletal General/Symptoms: muscle weakness, generalized. Functional capacity is unlimited. Legs.   Neurological:   TIA,    Endocrine:  Metabolic Disorders, Hyperlipoproteinemia  Psych:   Psychiatric History          Physical Exam  General:  Well nourished, Obesity    Airway/Jaw/Neck:  Airway Findings: Mouth Opening: Normal Tongue: Normal  General Airway Assessment: Adult  Mallampati: I  Improves to I with phonation.  TM Distance: Normal, at least 6 cm        Eyes/Ears/Nose:  EYES/EARS/NOSE FINDINGS: Normal   Dental:  DENTAL FINDINGS: Normal   Chest/Lungs:  Chest/Lungs Findings: Clear to auscultation, Normal Respiratory Rate     Heart/Vascular:  Heart Findings: Rate: Normal  Rhythm: Regular Rhythm  Sounds: Normal     Abdomen:  Abdomen Findings: Normal    Musculoskeletal:  Musculoskeletal Findings: Normal   Skin:  Skin Findings: Normal    Mental Status:  Mental Status Findings:  Cooperative, Alert and Oriented         Anesthesia Plan  Type of Anesthesia, risks & benefits discussed:  Anesthesia Type:  general  Patient's Preference: General   Intra-op Monitoring Plan: standard ASA monitors and arterial line  Intra-op  Monitoring Plan Comments:   Post Op Pain Control Plan: per primary service following discharge from PACU  Post Op Pain Control Plan Comments:   Induction:   IV  Beta Blocker:  Patient is on a Beta-Blocker and has received one dose within the past 24 hours (No further documentation required).       Informed Consent: Patient understands risks and agrees with Anesthesia plan.  Questions answered. Anesthesia consent signed with patient.  ASA Score: 4     Day of Surgery Review of History & Physical:    H&P update referred to the surgeon.     Anesthesia Plan Notes: NPO confirmed.   No history of anesthesia problems.   Hard of hearing.        Ready For Surgery From Anesthesia Perspective.

## 2020-08-05 ENCOUNTER — HOSPITAL ENCOUNTER (OUTPATIENT)
Dept: CARDIOLOGY | Facility: HOSPITAL | Age: 85
Discharge: HOME | DRG: 309 | End: 2020-08-05
Attending: INTERNAL MEDICINE
Payer: MEDICARE

## 2020-08-05 ENCOUNTER — HOSPITAL ENCOUNTER (INPATIENT)
Facility: HOSPITAL | Age: 85
LOS: 1 days | Discharge: HOME | DRG: 309 | End: 2020-08-05
Attending: INTERNAL MEDICINE | Admitting: INTERNAL MEDICINE
Payer: MEDICARE

## 2020-08-05 ENCOUNTER — ANESTHESIA (OUTPATIENT)
Dept: MEDSURG UNIT | Facility: HOSPITAL | Age: 85
DRG: 309 | End: 2020-08-05
Payer: MEDICARE

## 2020-08-05 ENCOUNTER — ANTI-COAG VISIT (OUTPATIENT)
Dept: CARDIOLOGY | Facility: CLINIC | Age: 85
End: 2020-08-05

## 2020-08-05 VITALS
TEMPERATURE: 97 F | WEIGHT: 166 LBS | HEIGHT: 66 IN | DIASTOLIC BLOOD PRESSURE: 69 MMHG | HEART RATE: 48 BPM | OXYGEN SATURATION: 97 % | RESPIRATION RATE: 18 BRPM | SYSTOLIC BLOOD PRESSURE: 140 MMHG | BODY MASS INDEX: 26.68 KG/M2

## 2020-08-05 DIAGNOSIS — I48.0 PAROXYSMAL ATRIAL FIBRILLATION: ICD-10-CM

## 2020-08-05 DIAGNOSIS — Z79.01 CURRENT USE OF LONG TERM ANTICOAGULATION: ICD-10-CM

## 2020-08-05 DIAGNOSIS — I48.20 CHRONIC ATRIAL FIBRILLATION: ICD-10-CM

## 2020-08-05 LAB
ABO + RH BLD: NORMAL
ANION GAP SERPL CALC-SCNC: 10 MMOL/L (ref 8–16)
APTT BLDCRRT: 28.8 SEC (ref 21–32)
BLD GP AB SCN CELLS X3 SERPL QL: NORMAL
BSA FOR ECHO PROCEDURE: 1.87 M2
BUN SERPL-MCNC: 17 MG/DL (ref 8–23)
CALCIUM SERPL-MCNC: 9 MG/DL (ref 8.7–10.5)
CHLORIDE SERPL-SCNC: 107 MMOL/L (ref 95–110)
CO2 SERPL-SCNC: 24 MMOL/L (ref 23–29)
CREAT SERPL-MCNC: 0.9 MG/DL (ref 0.5–1.4)
ERYTHROCYTE [DISTWIDTH] IN BLOOD BY AUTOMATED COUNT: 15.4 % (ref 11.5–14.5)
EST. GFR  (AFRICAN AMERICAN): >60 ML/MIN/1.73 M^2
EST. GFR  (NON AFRICAN AMERICAN): 57.3 ML/MIN/1.73 M^2
GLUCOSE SERPL-MCNC: 104 MG/DL (ref 70–110)
HCT VFR BLD AUTO: 31.9 % (ref 37–48.5)
HGB BLD-MCNC: 10.6 G/DL (ref 12–16)
INR PPP: 1.3 (ref 0.8–1.2)
MCH RBC QN AUTO: 30.7 PG (ref 27–31)
MCHC RBC AUTO-ENTMCNC: 33.2 G/DL (ref 32–36)
MCV RBC AUTO: 93 FL (ref 82–98)
PISA TR MAX VEL: 2.5 M/S
PLATELET # BLD AUTO: 165 K/UL (ref 150–350)
PMV BLD AUTO: 10.5 FL (ref 9.2–12.9)
POTASSIUM SERPL-SCNC: 3.9 MMOL/L (ref 3.5–5.1)
PROTHROMBIN TIME: 14.4 SEC (ref 9–12.5)
RBC # BLD AUTO: 3.45 M/UL (ref 4–5.4)
SODIUM SERPL-SCNC: 141 MMOL/L (ref 136–145)
TR MAX PG: 25 MMHG
WBC # BLD AUTO: 2.98 K/UL (ref 3.9–12.7)

## 2020-08-05 PROCEDURE — 85027 COMPLETE CBC AUTOMATED: CPT | Mod: HCNC

## 2020-08-05 PROCEDURE — 36620 INSERTION CATHETER ARTERY: CPT | Mod: HCNC,59,, | Performed by: ANESTHESIOLOGY

## 2020-08-05 PROCEDURE — D9220A PRA ANESTHESIA: ICD-10-PCS | Mod: HCNC,,, | Performed by: ANESTHESIOLOGY

## 2020-08-05 PROCEDURE — D9220A PRA ANESTHESIA: Mod: HCNC,,, | Performed by: ANESTHESIOLOGY

## 2020-08-05 PROCEDURE — 25000003 PHARM REV CODE 250: Mod: HCNC | Performed by: STUDENT IN AN ORGANIZED HEALTH CARE EDUCATION/TRAINING PROGRAM

## 2020-08-05 PROCEDURE — 27201037 HC PRESSURE MONITORING SET UP: Mod: HCNC

## 2020-08-05 PROCEDURE — S5010 5% DEXTROSE AND 0.45% SALINE: HCPCS | Mod: HCNC | Performed by: INTERNAL MEDICINE

## 2020-08-05 PROCEDURE — 63600175 PHARM REV CODE 636 W HCPCS: Mod: HCNC | Performed by: INTERNAL MEDICINE

## 2020-08-05 PROCEDURE — 93320 DOPPLER ECHO COMPLETE: CPT | Mod: 26,HCNC,, | Performed by: INTERNAL MEDICINE

## 2020-08-05 PROCEDURE — 37000009 HC ANESTHESIA EA ADD 15 MINS: Mod: HCNC | Performed by: INTERNAL MEDICINE

## 2020-08-05 PROCEDURE — 93010 EKG 12-LEAD: ICD-10-PCS | Mod: HCNC,,, | Performed by: INTERNAL MEDICINE

## 2020-08-05 PROCEDURE — 63600175 PHARM REV CODE 636 W HCPCS: Mod: HCNC | Performed by: STUDENT IN AN ORGANIZED HEALTH CARE EDUCATION/TRAINING PROGRAM

## 2020-08-05 PROCEDURE — 85730 THROMBOPLASTIN TIME PARTIAL: CPT | Mod: HCNC

## 2020-08-05 PROCEDURE — 93320 TRANSESOPHAGEAL ECHO (TEE) (CUPID ONLY): ICD-10-PCS | Mod: 26,HCNC,, | Performed by: INTERNAL MEDICINE

## 2020-08-05 PROCEDURE — 85610 PROTHROMBIN TIME: CPT | Mod: HCNC

## 2020-08-05 PROCEDURE — 80048 BASIC METABOLIC PNL TOTAL CA: CPT | Mod: HCNC

## 2020-08-05 PROCEDURE — 93312 ECHO TRANSESOPHAGEAL: CPT | Mod: 26,HCNC,, | Performed by: INTERNAL MEDICINE

## 2020-08-05 PROCEDURE — 36620 PR INSERT CATH,ART,PERCUT,SHORTTERM: ICD-10-PCS | Mod: HCNC,59,, | Performed by: ANESTHESIOLOGY

## 2020-08-05 PROCEDURE — 11000001 HC ACUTE MED/SURG PRIVATE ROOM: Mod: HCNC

## 2020-08-05 PROCEDURE — 25000003 PHARM REV CODE 250: Mod: HCNC | Performed by: INTERNAL MEDICINE

## 2020-08-05 PROCEDURE — 93010 ELECTROCARDIOGRAM REPORT: CPT | Mod: HCNC,,, | Performed by: INTERNAL MEDICINE

## 2020-08-05 PROCEDURE — 93005 ELECTROCARDIOGRAM TRACING: CPT | Mod: HCNC

## 2020-08-05 PROCEDURE — 37000008 HC ANESTHESIA 1ST 15 MINUTES: Mod: HCNC | Performed by: INTERNAL MEDICINE

## 2020-08-05 PROCEDURE — 86901 BLOOD TYPING SEROLOGIC RH(D): CPT | Mod: HCNC

## 2020-08-05 PROCEDURE — 93325 DOPPLER ECHO COLOR FLOW MAPG: CPT | Mod: HCNC

## 2020-08-05 PROCEDURE — 93312 TRANSESOPHAGEAL ECHO (TEE) (CUPID ONLY): ICD-10-PCS | Mod: 26,HCNC,, | Performed by: INTERNAL MEDICINE

## 2020-08-05 PROCEDURE — A4216 STERILE WATER/SALINE, 10 ML: HCPCS | Mod: HCNC | Performed by: STUDENT IN AN ORGANIZED HEALTH CARE EDUCATION/TRAINING PROGRAM

## 2020-08-05 RX ORDER — DIPHENHYDRAMINE HCL 50 MG
50 CAPSULE ORAL EVERY 6 HOURS
Status: DISCONTINUED | OUTPATIENT
Start: 2020-08-05 | End: 2020-08-05 | Stop reason: HOSPADM

## 2020-08-05 RX ORDER — DEXMEDETOMIDINE HYDROCHLORIDE 100 UG/ML
INJECTION, SOLUTION INTRAVENOUS
Status: DISCONTINUED | OUTPATIENT
Start: 2020-08-05 | End: 2020-08-05 | Stop reason: HOSPADM

## 2020-08-05 RX ORDER — HEPARIN SODIUM 200 [USP'U]/100ML
INJECTION, SOLUTION INTRAVENOUS
Status: DISCONTINUED | OUTPATIENT
Start: 2020-08-05 | End: 2020-08-05 | Stop reason: HOSPADM

## 2020-08-05 RX ORDER — FENTANYL CITRATE 50 UG/ML
INJECTION, SOLUTION INTRAMUSCULAR; INTRAVENOUS
Status: DISCONTINUED | OUTPATIENT
Start: 2020-08-05 | End: 2020-08-05 | Stop reason: HOSPADM

## 2020-08-05 RX ORDER — SODIUM CHLORIDE 9 MG/ML
INJECTION, SOLUTION INTRAVENOUS CONTINUOUS PRN
Status: DISCONTINUED | OUTPATIENT
Start: 2020-08-05 | End: 2020-08-05 | Stop reason: HOSPADM

## 2020-08-05 RX ORDER — DEXTROSE MONOHYDRATE AND SODIUM CHLORIDE 5; .45 G/100ML; G/100ML
INJECTION, SOLUTION INTRAVENOUS CONTINUOUS
Status: DISCONTINUED | OUTPATIENT
Start: 2020-08-05 | End: 2020-08-05 | Stop reason: HOSPADM

## 2020-08-05 RX ORDER — LIDOCAINE HYDROCHLORIDE 20 MG/ML
INJECTION, SOLUTION EPIDURAL; INFILTRATION; INTRACAUDAL; PERINEURAL
Status: DISCONTINUED | OUTPATIENT
Start: 2020-08-05 | End: 2020-08-05 | Stop reason: HOSPADM

## 2020-08-05 RX ADMIN — SODIUM CHLORIDE, SODIUM GLUCONATE, SODIUM ACETATE, POTASSIUM CHLORIDE, MAGNESIUM CHLORIDE, SODIUM PHOSPHATE, DIBASIC, AND POTASSIUM PHOSPHATE: .53; .5; .37; .037; .03; .012; .00082 INJECTION, SOLUTION INTRAVENOUS at 11:08

## 2020-08-05 RX ADMIN — DIPHENHYDRAMINE HYDROCHLORIDE 50 MG: 50 CAPSULE ORAL at 10:08

## 2020-08-05 RX ADMIN — SODIUM CHLORIDE: 9 INJECTION, SOLUTION INTRAVENOUS at 11:08

## 2020-08-05 RX ADMIN — DEXMEDETOMIDINE HYDROCHLORIDE 1 MCG/KG/HR: 100 INJECTION, SOLUTION, CONCENTRATE INTRAVENOUS at 11:08

## 2020-08-05 RX ADMIN — DEXTROSE AND SODIUM CHLORIDE: 5; .45 INJECTION, SOLUTION INTRAVENOUS at 10:08

## 2020-08-05 RX ADMIN — DEXMEDETOMIDINE HYDROCHLORIDE 75 MCG: 100 INJECTION, SOLUTION, CONCENTRATE INTRAVENOUS at 11:08

## 2020-08-05 RX ADMIN — FENTANYL CITRATE 25 MCG: 50 INJECTION, SOLUTION INTRAMUSCULAR; INTRAVENOUS at 11:08

## 2020-08-05 NOTE — ASSESSMENT & PLAN NOTE
Jenniffer Jimenez is 88 y.o. female who presents for Atrial Fibrillation. She has a PMHx of chronic Afib w/ on warfarin, TIA in 1997 and 2005, HTN, HLD, HFpEF, CKD III, hearing loss requiring a hearing aid. She presented for Watchman device implantation for her long standing atrial fibrillatio.     SONYA ROS  Dysphagia or odynophagia:  No  Liver Disease, esophageal disease, or known varices:  No  Upper GI Bleeding: No  Snoring:  No  Sleep Apnea:  unknown  Prior neck surgery or radiation:  No  History of anesthetic difficulties:  No  Last oral intake:  12 hours ago  Able to move neck in all directions:  Yes    Intraoperative SONYA for Watchman device implantation  -No absolute contraindications of esophageal stricture, tumor, perforation, laceration,or diverticulum and/or active GI bleed  -The risks, benefits & alternatives of the procedure were explained to the patient.   -The risks of transesophageal echo include but are not limited to:  Dental trauma, esophageal trauma/perforation, bleeding, laryngospasm/brochospasm, aspiration, sore throat/hoarseness, & dislodgement of the endotracheal tube/nasogastric tube (where applicable).    -The risks of moderate sedation include hypotension, respiratory depression, arrhythmias, bronchospasm, & death.    -Informed consent was obtained. The patient is agreeable to proceed with the procedure and all questions and concerns addressed.

## 2020-08-05 NOTE — HPI
88 y.o. female with history of chronic Afib w/ on warfarin, TIA in 1997 and 2005, HTN, HLD, HFpEF, CKD III, hearing loss requiring a hearing aid. Patient was seen in cardiology clinic 6/16/2020, having balance issues with falls leading to LE wounds, hence referred to IC clinic for Watchman evaluation. Wounds are now recovered on her L leg. No recent falls, but INR's have been labile and occasionally subtherapeutic in recent months.Lives alone, is unsteady on her feet for a long time but can ambulate without assistance at home. Use a rolling walker when she leaves the house.     AFib chronic since 2016.  CHADS2-VASc = 7 (11.2% annual risk of stroke, 15.7% risk of stroke/TIA/systemic embolism)  HASBLED = 5.

## 2020-08-05 NOTE — NURSING TRANSFER
Nursing Transfer Note      8/5/2020     Transfer To: [facility] room 6    Transfer via stretcher    Transfer with cardiac monitoring    Transported by PCT    Chart send with patient: Yes    Notified: daughter    Patient reassessed at: 1500

## 2020-08-05 NOTE — SUBJECTIVE & OBJECTIVE
"Past Medical History:   Diagnosis Date    Amblyopia of left eye 4/10/2013    Anticoagulant long-term use     Coumadin - A-Fib    Anxiety     Arthritis     Facet arthropathy, Lumbosacral    Atherosclerosis of aorta     Atrial fibrillation     Auricular fibrillation     Central retinal vein occlusion of left eye     CHF (congestive heart failure)     Chronic kidney disease, stage 3     Coronary artery disease     Depression     Diastolic heart failure 2014    Exotropia of both eyes 2013    recession RSR 5.0mm w/ adj; recession LR os 5.0 w/ adj; resect MR os  4.0mm    Hearing loss     History of resection of small bowel     Hyperlipidemia     Hypertension     Hypertensive retinopathy of both eyes     Hypoglycemia     Macular degeneration     Meniere's disease of both ears     OA (osteoarthritis) of shoulder     Right    Osteoporosis     Other and unspecified hyperlipidemia     Posterior vitreous detachment of both eyes     Rhinitis     TIA (transient ischemic attack)        Past Surgical History:   Procedure Laterality Date    APPENDECTOMY      CARDIAC CATHETERIZATION      CATARACT EXTRACTION W/  INTRAOCULAR LENS IMPLANT Bilateral      SECTION, CLASSIC      HYSTERECTOMY      INNER EAR SURGERY      JOINT REPLACEMENT      LEFT KNEE REPLACEMENT IN  -    OOPHORECTOMY      SINUS SURGERY      STRABISMUS SURGERY  13    RSR recession 5 mm, LLR recession 5 mm and LMR resection 4mm    STRABISMUS SURGERY  2014    recess LR OD 6mm    TONSILLECTOMY         Review of patient's allergies indicates:   Allergen Reactions    Bactrim [sulfamethoxazole-trimethoprim] Other (See Comments)     "Couldn't walk"    Opioids - morphine analogues Other (See Comments)     Bowel issues; bowel obstruction    Tizanidine Other (See Comments)     "Lips were numb,  Almost passed out."    Tramadol Hallucinations    Beta-blockers (beta-adrenergic blocking agts) Other (See Comments) "     Can not go on beta blockers for long period of time - due to taking allergy injections    Phenergan [promethazine] Other (See Comments)     Per pt. request- saw sisters have bad reaction to drug       No current facility-administered medications on file prior to encounter.      Current Outpatient Medications on File Prior to Encounter   Medication Sig    cholecalciferol, vitamin D3, (VITAMIN D3 ORAL) Take 1 tablet by mouth once daily.    furosemide (LASIX) 20 MG tablet TAKE 1 TABLET BY MOUTH DAILY; MAY TAKE AN ADDITIONAL TABLET AS NEEDED FOR SWELLING.    multivitamin (ONE DAILY MULTIVITAMIN) per tablet Take 1 tablet by mouth once daily.    potassium chloride (MICRO-K) 10 MEQ CpSR TAKE 1 CAPSULE EVERY DAY    pravastatin (PRAVACHOL) 40 MG tablet Take 1 tablet (40 mg total) by mouth 2 (two) times daily. (Patient taking differently: Take 40 mg by mouth once daily. )    triamcinolone (NASACORT) 55 mcg nasal inhaler 2 sprays by Nasal route once daily.    aspirin (ECOTRIN) 81 MG EC tablet Take 1 tablet (81 mg total) by mouth once daily.    diclofenac sodium (VOLTAREN) 1 % Gel Apply 2 g topically 3 (three) times daily. Apply to ankle for pain    fluticasone propionate (FLONASE) 50 mcg/actuation nasal spray USE 1 SPRAY IN EACH NOSTRIL ONE TIME DAILY    mupirocin (BACTROBAN) 2 % ointment Apply to affected area 3 times daily    peg 400-propylene glycol (SYSTANE) 0.4-0.3 % Drop Place 1-2 drops into both eyes as needed.     warfarin (COUMADIN) 5 MG tablet Take 1 tablet (5 mg total) by mouth Daily.     Family History     Problem Relation (Age of Onset)    Breast cancer Maternal Aunt    Diabetes Sister, Brother    Heart disease Sister, Sister    Hypertension Mother, Father    Liver disease Sister    No Known Problems Maternal Uncle, Paternal Aunt, Paternal Uncle, Maternal Grandmother, Maternal Grandfather, Paternal Grandmother, Paternal Grandfather, Daughter, Son, Son, Son        Tobacco Use    Smoking status:  Former Smoker     Types: Cigarettes     Quit date: 10/29/1982     Years since quittin.7    Smokeless tobacco: Never Used   Substance and Sexual Activity    Alcohol use: Yes     Alcohol/week: 0.0 standard drinks     Comment: socially    Drug use: No    Sexual activity: Never     Review of Systems   Constitution: Positive for malaise/fatigue. Negative for chills and decreased appetite.   HENT: Negative for congestion.    Eyes: Negative for blurred vision.   Cardiovascular: Positive for dyspnea on exertion, irregular heartbeat and palpitations. Negative for chest pain.   Respiratory: Negative for cough.    Endocrine: Negative for cold intolerance and heat intolerance.   Skin: Negative for rash.   Musculoskeletal: Negative for arthritis and back pain.   Gastrointestinal: Negative for abdominal pain, constipation and diarrhea.   Genitourinary: Negative for dysuria.   Neurological: Negative for dizziness and headaches.   Psychiatric/Behavioral: Negative for altered mental status.     Objective:     Vital Signs (Most Recent):    Vital Signs (24h Range):           There is no height or weight on file to calculate BMI.            No intake or output data in the 24 hours ending 20 1038    Lines/Drains/Airways     Epidural Line                 Perineural Analgesia/Anesthesia Assessment (using dermatomes) Epidural -- days         Perineural Analgesia/Anesthesia Assessment (using dermatomes) Epidural -- days          Peripheral Intravenous Line                 Peripheral IV - Single Lumen 20 1434 20 G Left Antecubital 40 days                Physical Exam   Constitutional: She is oriented to person, place, and time. She appears well-nourished. No distress.   HENT:   Head: Normocephalic.   Eyes: Pupils are equal, round, and reactive to light.   Neck: No JVD present. No thyromegaly present.   Cardiovascular: Normal rate.   No murmur heard.  Pulmonary/Chest: Effort normal. No respiratory distress.   Abdominal:  Soft. She exhibits no distension.   Musculoskeletal:         General: No edema.   Neurological: She is alert and oriented to person, place, and time.   Vitals reviewed.      Significant Labs:   CBC   Recent Labs   Lab 08/05/20  1016   WBC 2.98*   HGB 10.6*   HCT 31.9*          Significant Imaging: Echocardiogram:   2D echo with color flow doppler:   Results for orders placed or performed in visit on 05/19/17   2D echo with color flow doppler   Result Value Ref Range    QEF 55 55 - 65    Mitral Valve Regurgitation TRIVIAL TO MILD     Est. PA Systolic Pressure 36.3     Tricuspid Valve Regurgitation MILD     Narrative    Date of Procedure: 05/19/2017        TEST DESCRIPTION       Aorta: The aortic root is normal in size, measuring 3.1 cm at sinotubular junction and 3.0 cm at Sinuses of Valsalva. The proximal ascending aorta is normal in size, measuring 3.0 cm across.     Left Atrium: The left atrial volume index is severely enlarged, measuring 50.62 cc/m2.     Left Ventricle: The left ventricle is normal in size, with an end-diastolic diameter of 5.0 cm, and an end-systolic diameter of 3.5 cm. LV wall thickness is normal, with the septum and the posterior wall each measuring 0.8 cm across. Relative wall   thickness was normal at 0.32, and the LV mass index was 81.4 g/m2 consistent with normal left ventricular mass. There are no regional wall motion abnormalities. Left ventricular systolic function appears normal. Visually estimated ejection fraction is   55-60%. The LV Doppler derived stroke volume equals 42.0 ccs.     Diastolic indices: Mitral inflow pattern reveals fusion of E & A waves. E wave velocity 1.4 m/s, E/A ratio 4.0,  msec., E/e' ratio(sep) 17. Diastolic function is indeterminate.     Right Atrium: The right atrium is upper limit of normal, measuring 6.1 cm in length and 4.2 cm in width in the apical view. The end-systolic right atrial area is 20.0 cm2.     Right Ventricle: The right ventricle  is normal in size measuring 4.0 cm at the base in the apical right ventricle-focused view. Global right ventricular systolic function appears normal. Tricuspid annular plane systolic excursion (TAPSE) is 1.1 cm. The   estimated PA systolic pressure is 36 mmHg.     Aortic Valve:  The aortic valve is mildly sclerotic. The aortic valve is tri-leaflet in structure.     Mitral Valve:  The mitral valve is mildly sclerotic. There is trivial to mild mitral regurgitation. There is mitral annular calcification.     Tricuspid Valve:  There is mild tricuspid regurgitation. Tricuspid valve is normal in structure with normal leaflet mobility.     Pulmonary Valve:  There is trivial pulmonic regurgitation. Pulmonary valve is normal in structure with normal leaflet mobility.     IVC: IVC is enlarged but collapses > 50% with a sniff, suggesting intermediate right atrial pressure of 8 mmHg.     Intracavitary: There is no evidence of pericardial effusion, intracavity mass, thrombi, or vegetation.         CONCLUSIONS     1 - Normal left ventricular systolic function (EF 55-60%).     2 - Normal right ventricular systolic function .     3 - Indeterminate LV diastolic function.     4 - The estimated PA systolic pressure is 36 mmHg.     5 - Trivial to mild mitral regurgitation.     6 - Mild tricuspid regurgitation.     7 - Trivial pulmonic regurgitation.     8 - Intermediate central venous pressure.             This document has been electronically    SIGNED BY: Elo Groves MD On: 05/19/2017 11:29    This document was originally electronically signed on: 05/19/2017 11:06

## 2020-08-05 NOTE — ANESTHESIA PROCEDURE NOTES
Arterial    Diagnosis: atrial fibrillation    Patient location during procedure: done in OR  Procedure start time: 8/5/2020 11:47 AM  Timeout: 8/5/2020 11:45 AM  Procedure end time: 8/5/2020 11:52 AM    Staffing  Authorizing Provider: Sarabjit Choi MD  Performing Provider: Nilesh Moreno MD    Anesthesiologist was present at the time of the procedure.    Preanesthetic Checklist  Completed: patient identified, site marked, surgical consent, pre-op evaluation, timeout performed, IV checked, risks and benefits discussed, monitors and equipment checked and anesthesia consent givenArterial  Skin Prep: chlorhexidine gluconate and isopropyl alcohol  Local Infiltration: lidocaine  Orientation: right  Location: radial  Catheter Size: 20 G  Catheter placement by Ultrasound guidance. Heme positive aspiration all ports.  Vessel Caliber: small, patent, compressibility normal  Needle advanced into vessel with real time Ultrasound guidance.Insertion Attempts: 2  Assessment  Dressing: secured with tape and tegaderm  Patient: Tolerated well

## 2020-08-05 NOTE — TRANSFER OF CARE
"Anesthesia Transfer of Care Note    Patient: Jenniffer Jimenez    Procedure(s) Performed: Procedure(s) (LRB):  Left atrial appendage closure device (N/A)    Patient location: PACU    Anesthesia Type: general    Transport from OR: Transported from OR on 6-10 L/min O2 by face mask with adequate spontaneous ventilation    Post pain: adequate analgesia    Post assessment: no apparent anesthetic complications    Post vital signs: stable    Level of consciousness: awake    Nausea/Vomiting: no nausea/vomiting    Complications: none    Transfer of care protocol was followed      Last vitals:   Visit Vitals  BP (!) 140/69   Pulse (!) 48   Temp 36.1 °C (97 °F) (Oral)   Resp 18   Ht 5' 6" (1.676 m)   Wt 75.3 kg (166 lb)   LMP  (LMP Unknown)   SpO2 97%   Breastfeeding No   BMI 26.79 kg/m²     "

## 2020-08-05 NOTE — PROGRESS NOTES
"Per d/c summary:  Hospital Course:  "Patient presented for left atrial appendage occlusion/Watchman device placement. Patient reported that she had stopped coumadin 2 days prior to the procedure. Pre-procedure SONYA demonstrated a Left atrial appendage thrombus. Procedure aborted and patient brought back to recovery. Plan to switch to eliquis 5 mg twice a day as patient has had subtherapeutic INRs in the past. Patient will be brought back in 6 weeks for repeat SONYA(cath lab) and watchman procedure. Stop eliquis 2 days prior to procedure."      Not sure why patient held preop but likely would not have changed her outcome. Pt now on Eliquis. D/c from clinic. Will call patient to confirm change and advise she can now resume a normal diet w/out concern for vit K.  "

## 2020-08-05 NOTE — HOSPITAL COURSE
Patient presented for left atrial appendage occlusion/Watchman device placement. Patient reported that she had stopped coumadin 2 days prior to the procedure. Pre-procedure SONYA demonstrated a Left atrial appendage thrombus. Procedure aborted and patient brought back to recovery. Plan to switch to eliquis 5 mg twice a day as patient has had subtherapeutic INRs in the past. Patient will be brought back in 6 weeks for repeat SONYA(cath lab) and watchman procedure.

## 2020-08-05 NOTE — PROGRESS NOTES
Patient ambulated to bathroom with one person assistance. Patient is AAOx3. PIVs removed. Patient given at copy of discharge instructions with follow up appointments listed. New medications picked up from pharmacy. Patient discharged home per MD order with all personal belongings.

## 2020-08-05 NOTE — DISCHARGE SUMMARY
[facility] - Short Stay Cardiac Unit  Interventional Cardiology  Discharge Summary      Patient Name: Jenniffer Jimenez  MRN: [id number]  Admission Date: 8/5/2020  Hospital Length of Stay: 0 days  Discharge Date and Time:  08/05/2020 3:52 PM  Attending Physician: Abundio Curtis MD  Discharging Provider: Caesar Burt MD  Primary Care Physician: Rubi Young DO    HPI:  88 y.o. female with history of chronic Afib w/ on warfarin, TIA in 1997 and 2005, HTN, HLD, HFpEF, CKD III, hearing loss requiring a hearing aid. Patient was seen in cardiology clinic 6/16/2020, having balance issues with falls leading to LE wounds, hence referred to IC clinic for Watchman evaluation. Wounds are now recovered on her L leg. No recent falls, but INR's have been labile and occasionally subtherapeutic in recent months.Lives alone, is unsteady on her feet for a long time but can ambulate without assistance at home. Use a rolling walker when she leaves the house.     AFib chronic since 2016.  CHADS2-VASc = 7 (11.2% annual risk of stroke, 15.7% risk of stroke/TIA/systemic embolism)  HASBLED = 5.    Procedure(s) (LRB):  Left atrial appendage closure device (N/A)     Indwelling Lines/Drains at time of discharge:  Lines/Drains/Airways     Epidural Line                 Perineural Analgesia/Anesthesia Assessment (using dermatomes) Epidural -- days         Perineural Analgesia/Anesthesia Assessment (using dermatomes) Epidural -- days                Hospital Course:  Patient presented for left atrial appendage occlusion/Watchman device placement. Patient reported that she had stopped coumadin 2 days prior to the procedure. Pre-procedure SONYA demonstrated a Left atrial appendage thrombus. Procedure aborted and patient brought back to recovery. Plan to switch to eliquis 5 mg twice a day as patient has had subtherapeutic INRs in the past. Patient will be brought back in 6 weeks for repeat SONYA(cath lab) and watchman procedure. Stop  eliquis 2 days prior to procedure.         Significant Diagnostic Studies: Labs:   BMP:   Recent Labs   Lab 08/05/20  1016         K 3.9      CO2 24   BUN 17   CREATININE 0.9   CALCIUM 9.0    and CBC   Recent Labs   Lab 08/05/20  1016   WBC 2.98*   HGB 10.6*   HCT 31.9*          Pending Diagnostic Studies:     None        No new Assessment & Plan notes have been filed under this hospital service since the last note was generated.  Service: Interventional Cardiology      Discharged Condition: good    Follow Up:in 6 weeks for repeat SONYA in cath lab and watchman    Patient Instructions:      Type & Screen   Standing Status: Future Standing Exp. Date: 09/05/21     Medications:  Reconciled Home Medications:      Medication List      CHANGE how you take these medications    pravastatin 40 MG tablet  Commonly known as: PRAVACHOL  Take 1 tablet (40 mg total) by mouth 2 (two) times daily.  What changed: when to take this        CONTINUE taking these medications    aspirin 81 MG EC tablet  Commonly known as: ECOTRIN  Take 1 tablet (81 mg total) by mouth once daily.     diclofenac sodium 1 % Gel  Commonly known as: VOLTAREN  Apply 2 g topically 3 (three) times daily. Apply to ankle for pain     fluticasone propionate 50 mcg/actuation nasal spray  Commonly known as: FLONASE  USE 1 SPRAY IN EACH NOSTRIL ONE TIME DAILY     furosemide 20 MG tablet  Commonly known as: LASIX  TAKE 1 TABLET BY MOUTH DAILY; MAY TAKE AN ADDITIONAL TABLET AS NEEDED FOR SWELLING.     mupirocin 2 % ointment  Commonly known as: BACTROBAN  Apply to affected area 3 times daily     ONE DAILY MULTIVITAMIN per tablet  Generic drug: multivitamin  Take 1 tablet by mouth once daily.     potassium chloride 10 MEQ Cpsr  Commonly known as: MICRO-K  TAKE 1 CAPSULE EVERY DAY     SYSTANE (PROPYLENE GLYCOL) 0.4-0.3 % Drop  Generic drug: peg 400-propylene glycol  Place 1-2 drops into both eyes as needed.     triamcinolone 55 mcg nasal  inhaler  Commonly known as: NASACORT  2 sprays by Nasal route once daily.     VITAMIN D3 ORAL  Take 1 tablet by mouth once daily.        STOP taking these medications    warfarin 5 MG tablet  Commonly known as: COUMADIN            Time spent on the discharge of patient: 35 minutes    Caesar Burt MD  Interventional Cardiology  [facility] Short Stay Cardiac Unit

## 2020-08-05 NOTE — CONSULTS
[facility]  Cardiology  Consult Note    Patient Name: Jenniffer Jimenez  MRN: [id number]  Admission Date: 8/5/2020  Hospital Length of Stay: 0 days  Code Status: Prior   Attending Provider: Abundio Curtis MD   Consulting Provider: Hitesh Varela MD  Primary Care Physician: Rubi Young DO  Principal Problem:<principal problem not specified>    Patient information was obtained from patient, past medical records and ER records.     Consults  Subjective:     Chief Complaint:  atrial fibrillation      HPI:   Jenniffer Jimenez is 88 y.o. female who presents for Atrial Fibrillation. She has a PMHx of chronic Afib w/ on warfarin, TIA in 1997 and 2005, HTN, HLD, HFpEF, CKD III, hearing loss requiring a hearing aid. Patient was seen in cardiology clinic 6/16/2020, having balance issues with falls leading to LE wounds, hence referred to IC clinic for Watchman evaluation. Wounds are now recovered on her L leg. No recent falls, but INR's have been labile and occasionally subtherapeutic in recent months. Creatinine/CrCl: 0.8 on 6/26/2020. She has prior history of colitis in 2016 and bowel obstruction attributed to narcotics. AFib chronic since 2016. CHADS2-VASc = 7 (11.2% annual risk of stroke, 15.7% risk of stroke/TIA/systemic embolism). HASBLED = 5.    Card Meds:  Warfarin 5 mg PO daily, furosemide 20 mg Daily    TTE 5/2017  Aorta: The aortic root is normal in size, measuring 3.1 cm at sinotubular junction and 3.0 cm at Sinuses of Valsalva. The proximal ascending aorta is normal in size, measuring 3.0 cm across.   Left Atrium: The left atrial volume index is severely enlarged, measuring 50.62 cc/m2.   Left Ventricle: Left ventricular systolic function appears normal. Visually estimated ejection fraction is 55-60%.   Right Atrium: The right atrium is upper limit of normal, measuring 6.1 cm in length and 4.2 cm in width in the apical view.   Right Ventricle: The right ventricle  is normal in size measuring 4.0 cm at the base in the apical right ventricle-focused view.   Aortic Valve:  The aortic valve is mildly sclerotic. The aortic valve is trileaflet in structure.   Mitral Valve:  The mitral valve is mildly sclerotic. There is trivial to mild mitral regurgitation. There is mitral annular calcification.   Tricuspid Valve:  There is mild tricuspid regurgitation. Tricuspid valve is normal in structure with normal leaflet mobility.   Pulmonary Valve:  There is trivial pulmonic regurgitation. Pulmonary valve is normal in structure with normal leaflet mobility.   IVC: IVC is enlarged but collapses > 50% with a sniff, suggesting intermediate right atrial pressure of 8 mmHg.   Intracavitary: There is no evidence of pericardial effusion, intracavitary mass, thrombi, or vegetation.      Past Medical History:   Diagnosis Date    Amblyopia of left eye 4/10/2013    Anticoagulant long-term use     Coumadin - A-Fib    Anxiety     Arthritis     Facet arthropathy, Lumbosacral    Atherosclerosis of aorta     Atrial fibrillation     Auricular fibrillation     Central retinal vein occlusion of left eye     CHF (congestive heart failure)     Chronic kidney disease, stage 3     Coronary artery disease     Depression     Diastolic heart failure 8/20/2014    Exotropia of both eyes 2/6/2013    recession RSR 5.0mm w/ adj; recession LR os 5.0 w/ adj; resect MR os  4.0mm    Hearing loss     History of resection of small bowel     Hyperlipidemia     Hypertension     Hypertensive retinopathy of both eyes     Hypoglycemia     Macular degeneration     Meniere's disease of both ears     OA (osteoarthritis) of shoulder     Right    Osteoporosis     Other and unspecified hyperlipidemia     Posterior vitreous detachment of both eyes     Rhinitis     TIA (transient ischemic attack)        Past Surgical History:   Procedure Laterality Date    APPENDECTOMY      CARDIAC CATHETERIZATION       "CATARACT EXTRACTION W/  INTRAOCULAR LENS IMPLANT Bilateral      SECTION, CLASSIC      HYSTERECTOMY      INNER EAR SURGERY      JOINT REPLACEMENT      LEFT KNEE REPLACEMENT IN 2013 -    OOPHORECTOMY      SINUS SURGERY      STRABISMUS SURGERY  13    RSR recession 5 mm, LLR recession 5 mm and LMR resection 4mm    STRABISMUS SURGERY  2014    recess LR OD 6mm    TONSILLECTOMY         Review of patient's allergies indicates:   Allergen Reactions    Bactrim [sulfamethoxazole-trimethoprim] Other (See Comments)     "Couldn't walk"    Opioids - morphine analogues Other (See Comments)     Bowel issues; bowel obstruction    Tizanidine Other (See Comments)     "Lips were numb,  Almost passed out."    Tramadol Hallucinations    Beta-blockers (beta-adrenergic blocking agts) Other (See Comments)     Can not go on beta blockers for long period of time - due to taking allergy injections    Phenergan [promethazine] Other (See Comments)     Per pt. request- saw sisters have bad reaction to drug       No current facility-administered medications on file prior to encounter.      Current Outpatient Medications on File Prior to Encounter   Medication Sig    cholecalciferol, vitamin D3, (VITAMIN D3 ORAL) Take 1 tablet by mouth once daily.    furosemide (LASIX) 20 MG tablet TAKE 1 TABLET BY MOUTH DAILY; MAY TAKE AN ADDITIONAL TABLET AS NEEDED FOR SWELLING.    multivitamin (ONE DAILY MULTIVITAMIN) per tablet Take 1 tablet by mouth once daily.    potassium chloride (MICRO-K) 10 MEQ CpSR TAKE 1 CAPSULE EVERY DAY    pravastatin (PRAVACHOL) 40 MG tablet Take 1 tablet (40 mg total) by mouth 2 (two) times daily. (Patient taking differently: Take 40 mg by mouth once daily. )    triamcinolone (NASACORT) 55 mcg nasal inhaler 2 sprays by Nasal route once daily.    aspirin (ECOTRIN) 81 MG EC tablet Take 1 tablet (81 mg total) by mouth once daily.    diclofenac sodium (VOLTAREN) 1 % Gel Apply 2 g topically 3 " (three) times daily. Apply to ankle for pain    fluticasone propionate (FLONASE) 50 mcg/actuation nasal spray USE 1 SPRAY IN EACH NOSTRIL ONE TIME DAILY    mupirocin (BACTROBAN) 2 % ointment Apply to affected area 3 times daily    peg 400-propylene glycol (SYSTANE) 0.4-0.3 % Drop Place 1-2 drops into both eyes as needed.     warfarin (COUMADIN) 5 MG tablet Take 1 tablet (5 mg total) by mouth Daily.     Family History     Problem Relation (Age of Onset)    Breast cancer Maternal Aunt    Diabetes Sister, Brother    Heart disease Sister, Sister    Hypertension Mother, Father    Liver disease Sister    No Known Problems Maternal Uncle, Paternal Aunt, Paternal Uncle, Maternal Grandmother, Maternal Grandfather, Paternal Grandmother, Paternal Grandfather, Daughter, Son, Son, Son        Tobacco Use    Smoking status: Former Smoker     Types: Cigarettes     Quit date: 10/29/1982     Years since quittin.7    Smokeless tobacco: Never Used   Substance and Sexual Activity    Alcohol use: Yes     Alcohol/week: 0.0 standard drinks     Comment: socially    Drug use: No    Sexual activity: Never     Review of Systems   Constitution: Positive for malaise/fatigue. Negative for chills and decreased appetite.   HENT: Negative for congestion.    Eyes: Negative for blurred vision.   Cardiovascular: Positive for dyspnea on exertion, irregular heartbeat and palpitations. Negative for chest pain.   Respiratory: Negative for cough.    Endocrine: Negative for cold intolerance and heat intolerance.   Skin: Negative for rash.   Musculoskeletal: Negative for arthritis and back pain.   Gastrointestinal: Negative for abdominal pain, constipation and diarrhea.   Genitourinary: Negative for dysuria.   Neurological: Negative for dizziness and headaches.   Psychiatric/Behavioral: Negative for altered mental status.     Objective:     Vital Signs (Most Recent):    Vital Signs (24h Range):           There is no height or weight on file to  calculate BMI.            No intake or output data in the 24 hours ending 08/05/20 1038    Lines/Drains/Airways     Epidural Line                 Perineural Analgesia/Anesthesia Assessment (using dermatomes) Epidural -- days         Perineural Analgesia/Anesthesia Assessment (using dermatomes) Epidural -- days          Peripheral Intravenous Line                 Peripheral IV - Single Lumen 06/25/20 1434 20 G Left Antecubital 40 days                Physical Exam   Constitutional: She is oriented to person, place, and time. She appears well-nourished. No distress.   HENT:   Head: Normocephalic.   Eyes: Pupils are equal, round, and reactive to light.   Neck: No JVD present. No thyromegaly present.   Cardiovascular: Normal rate.   No murmur heard.  Pulmonary/Chest: Effort normal. No respiratory distress.   Abdominal: Soft. She exhibits no distension.   Musculoskeletal:         General: No edema.   Neurological: She is alert and oriented to person, place, and time.   Vitals reviewed.      Significant Labs:   CBC   Recent Labs   Lab 08/05/20  1016   WBC 2.98*   HGB 10.6*   HCT 31.9*          Significant Imaging: Echocardiogram:   2D echo with color flow doppler:   Results for orders placed or performed in visit on 05/19/17   2D echo with color flow doppler   Result Value Ref Range    QEF 55 55 - 65    Mitral Valve Regurgitation TRIVIAL TO MILD     Est. PA Systolic Pressure 36.3     Tricuspid Valve Regurgitation MILD     Narrative    Date of Procedure: 05/19/2017        TEST DESCRIPTION       Aorta: The aortic root is normal in size, measuring 3.1 cm at sinotubular junction and 3.0 cm at Sinuses of Valsalva. The proximal ascending aorta is normal in size, measuring 3.0 cm across.     Left Atrium: The left atrial volume index is severely enlarged, measuring 50.62 cc/m2.     Left Ventricle: The left ventricle is normal in size, with an end-diastolic diameter of 5.0 cm, and an end-systolic diameter of 3.5 cm. LV wall  thickness is normal, with the septum and the posterior wall each measuring 0.8 cm across. Relative wall   thickness was normal at 0.32, and the LV mass index was 81.4 g/m2 consistent with normal left ventricular mass. There are no regional wall motion abnormalities. Left ventricular systolic function appears normal. Visually estimated ejection fraction is   55-60%. The LV Doppler derived stroke volume equals 42.0 ccs.     Diastolic indices: Mitral inflow pattern reveals fusion of E & A waves. E wave velocity 1.4 m/s, E/A ratio 4.0,  msec., E/e' ratio(sep) 17. Diastolic function is indeterminate.     Right Atrium: The right atrium is upper limit of normal, measuring 6.1 cm in length and 4.2 cm in width in the apical view. The end-systolic right atrial area is 20.0 cm2.     Right Ventricle: The right ventricle is normal in size measuring 4.0 cm at the base in the apical right ventricle-focused view. Global right ventricular systolic function appears normal. Tricuspid annular plane systolic excursion (TAPSE) is 1.1 cm. The   estimated PA systolic pressure is 36 mmHg.     Aortic Valve:  The aortic valve is mildly sclerotic. The aortic valve is tri-leaflet in structure.     Mitral Valve:  The mitral valve is mildly sclerotic. There is trivial to mild mitral regurgitation. There is mitral annular calcification.     Tricuspid Valve:  There is mild tricuspid regurgitation. Tricuspid valve is normal in structure with normal leaflet mobility.     Pulmonary Valve:  There is trivial pulmonic regurgitation. Pulmonary valve is normal in structure with normal leaflet mobility.     IVC: IVC is enlarged but collapses > 50% with a sniff, suggesting intermediate right atrial pressure of 8 mmHg.     Intracavitary: There is no evidence of pericardial effusion, intracavity mass, thrombi, or vegetation.         CONCLUSIONS     1 - Normal left ventricular systolic function (EF 55-60%).     2 - Normal right ventricular systolic  function .     3 - Indeterminate LV diastolic function.     4 - The estimated PA systolic pressure is 36 mmHg.     5 - Trivial to mild mitral regurgitation.     6 - Mild tricuspid regurgitation.     7 - Trivial pulmonic regurgitation.     8 - Intermediate central venous pressure.             This document has been electronically    SIGNED BY: Elo Groves MD On: 05/19/2017 11:29    This document was originally electronically signed on: 05/19/2017 11:06        Assessment and Plan:     Paroxysmal atrial fibrillation  Jenniffer Jimenez is 88 y.o. female who presents for Atrial Fibrillation. She has a PMHx of chronic Afib w/ on warfarin, TIA in 1997 and 2005, HTN, HLD, HFpEF, CKD III, hearing loss requiring a hearing aid. She presented for Watchman device implantation for her long standing atrial fibrillatio.     SONYA ROS  Dysphagia or odynophagia:  No  Liver Disease, esophageal disease, or known varices:  No  Upper GI Bleeding: No  Snoring:  No  Sleep Apnea:  unknown  Prior neck surgery or radiation:  No  History of anesthetic difficulties:  No  Last oral intake:  12 hours ago  Able to move neck in all directions:  Yes    Intraoperative SONYA for Watchman device implantation  -No absolute contraindications of esophageal stricture, tumor, perforation, laceration,or diverticulum and/or active GI bleed  -The risks, benefits & alternatives of the procedure were explained to the patient.   -The risks of transesophageal echo include but are not limited to:  Dental trauma, esophageal trauma/perforation, bleeding, laryngospasm/brochospasm, aspiration, sore throat/hoarseness, & dislodgement of the endotracheal tube/nasogastric tube (where applicable).    -The risks of moderate sedation include hypotension, respiratory depression, arrhythmias, bronchospasm, & death.    -Informed consent was obtained. The patient is agreeable to proceed with the procedure and all questions and concerns addressed.        VTE Risk Mitigation  (From admission, onward)    None      Thank you for your consult. I will follow-up with patient. Please contact us if you have any additional questions.    Hitesh Varela MD  Cardiology   [facility]

## 2020-08-05 NOTE — ANESTHESIA POSTPROCEDURE EVALUATION
Anesthesia Post Evaluation    Patient: Jenniffer Jimenez    Procedure(s) Performed: Procedure(s) (LRB):  Left atrial appendage closure device (N/A)    Final Anesthesia Type: general    Patient location during evaluation: PACU  Patient participation: Yes- Able to Participate  Level of consciousness: awake and alert  Post-procedure vital signs: reviewed and stable  Pain management: adequate  Airway patency: patent    PONV status at discharge: No PONV  Anesthetic complications: no      Cardiovascular status: blood pressure returned to baseline  Respiratory status: unassisted  Hydration status: euvolemic  Follow-up not needed.          Vitals Value Taken Time   /56 08/05/20 1301   Temp 36.7 °C (98.1 °F) 08/05/20 1258   Pulse 54 08/05/20 1311   Resp 11 08/05/20 1311   SpO2 99 % 08/05/20 1311   Vitals shown include unvalidated device data.      No case tracking events are documented in the log.      Pain/Hammad Score: Hammad Score: 8 (8/5/2020  1:00 PM)

## 2020-08-05 NOTE — ANESTHESIA RELEASE NOTE
"Anesthesia Release from PACU Note    Patient: Jenniffer Jimenez    Procedure(s) Performed: Procedure(s) (LRB):  Left atrial appendage closure device (N/A)    Anesthesia type: general    Post pain: Adequate analgesia    Post assessment: no apparent anesthetic complications    Last Vitals:   Visit Vitals  BP (!) 140/69   Pulse (!) 48   Temp 36.1 °C (97 °F) (Oral)   Resp 18   Ht 5' 6" (1.676 m)   Wt 75.3 kg (166 lb)   LMP  (LMP Unknown)   SpO2 97%   Breastfeeding No   BMI 26.79 kg/m²       Post vital signs: stable    Level of consciousness: awake    Nausea/Vomiting: no nausea/no vomiting    Complications: none    Airway Patency: patent    Respiratory: unassisted    Cardiovascular: stable and blood pressure at baseline    Hydration: euvolemic  "

## 2020-08-06 DIAGNOSIS — I48.91 ATRIAL FIBRILLATION: ICD-10-CM

## 2020-08-06 DIAGNOSIS — I48.0 PAROXYSMAL ATRIAL FIBRILLATION: Primary | ICD-10-CM

## 2020-08-06 NOTE — PROGRESS NOTES
8/6/20 contacted pt and pt confirmed that she is on the Eliquis and started it yesterday when she got home and pt was advised of note.

## 2020-08-07 ENCOUNTER — TELEPHONE (OUTPATIENT)
Dept: ENDOSCOPY | Facility: HOSPITAL | Age: 85
End: 2020-08-07

## 2020-08-11 ENCOUNTER — OUTPATIENT CASE MANAGEMENT (OUTPATIENT)
Dept: ADMINISTRATIVE | Facility: OTHER | Age: 85
End: 2020-08-11

## 2020-08-11 ENCOUNTER — PROCEDURE VISIT (OUTPATIENT)
Dept: OPHTHALMOLOGY | Facility: CLINIC | Age: 85
End: 2020-08-11
Payer: MEDICARE

## 2020-08-11 DIAGNOSIS — H34.8122 STABLE CENTRAL RETINAL VEIN OCCLUSION OF LEFT EYE: ICD-10-CM

## 2020-08-11 DIAGNOSIS — H34.8320 BRANCH RETINAL VEIN OCCLUSION OF LEFT EYE WITH MACULAR EDEMA: ICD-10-CM

## 2020-08-11 DIAGNOSIS — H43.813 POSTERIOR VITREOUS DETACHMENT, BILATERAL: ICD-10-CM

## 2020-08-11 DIAGNOSIS — H35.3221 EXUDATIVE AGE-RELATED MACULAR DEGENERATION OF LEFT EYE WITH ACTIVE CHOROIDAL NEOVASCULARIZATION: Primary | ICD-10-CM

## 2020-08-11 PROCEDURE — 92012 PR EYE EXAM, EST PATIENT,INTERMED: ICD-10-PCS | Mod: 25,HCNC,S$GLB, | Performed by: OPHTHALMOLOGY

## 2020-08-11 PROCEDURE — 67028 INJECTION EYE DRUG: CPT | Mod: HCNC,LT,S$GLB, | Performed by: OPHTHALMOLOGY

## 2020-08-11 PROCEDURE — 92134 POSTERIOR SEGMENT OCT RETINA (OCULAR COHERENCE TOMOGRAPHY)-BOTH EYES: ICD-10-PCS | Mod: HCNC,S$GLB,, | Performed by: OPHTHALMOLOGY

## 2020-08-11 PROCEDURE — 92134 CPTRZ OPH DX IMG PST SGM RTA: CPT | Mod: HCNC,S$GLB,, | Performed by: OPHTHALMOLOGY

## 2020-08-11 PROCEDURE — 67028 PR INJECT INTRAVITREAL PHARMCOLOGIC: ICD-10-PCS | Mod: HCNC,LT,S$GLB, | Performed by: OPHTHALMOLOGY

## 2020-08-11 PROCEDURE — 92012 INTRM OPH EXAM EST PATIENT: CPT | Mod: 25,HCNC,S$GLB, | Performed by: OPHTHALMOLOGY

## 2020-08-11 RX ADMIN — Medication 1.25 MG: at 03:08

## 2020-08-11 NOTE — PATIENT INSTRUCTIONS

## 2020-08-11 NOTE — PROGRESS NOTES
"HPI     Eye Problem      Additional comments: BRVO OS              Comments     DLS: 07/07/2020 Dr. Hernandez    Patient states the circles in her peripheral vision in the left eye are   not as bad. She also says the vision in the left eye is still "weak."      OCT - OD - no ME  OS - SRF      A/P    1. Wet AMD OS  PPCNVM  S/p Avastin OS x 2    Avastin OS today    2. Prior CRVO OS  Stable    3. PCIOL OU    4. S/p strab sx    5. HTN Ret OU  BP control      1 month Avastin OS only      Risks, benefits, and alternatives to treatment discussed in detail with the patient.  The patient voiced understanding and wished to proceed with the procedure    Injection Procedure Note:  Diagnosis: Wet AMD OS    Patient Identified and Time Out complete  Pt Prefers to be marked with sticker rather than ink marker (OHS.Qual.[id number] #5)  Topical Proparacaine and Betadine.  Inject Avastin OS at 6:00 @ 3.5-4mm posterior to limbus  Post Operative Dx: Same  Complications: None  Follow up as above.    "

## 2020-08-11 NOTE — LETTER
August 24, 2020    Jenniffer Jimenez  [address] [place] [id number]             [facility] [address] [place] [address] Dear Ms Jimenez,    I work with [facility] Outpatient Case Management Department. I have been unsuccessful at reaching you to follow-up to see how you have been doing. Please call me back at your earliest convenience to discuss your health care needs.      I can be reached at [phone] from 8:00AM to 4:30 PM on Monday thru Friday. [facility] is a program offered through [facility] where a nurse is available 24/7 to answer questions or provide medical advice, their number is [phone].    Thanks,      Brisa Al, RN Case Manager  Outpatient Case Management  [phone]

## 2020-08-12 DIAGNOSIS — I48.11 LONGSTANDING PERSISTENT ATRIAL FIBRILLATION: Primary | ICD-10-CM

## 2020-08-12 RX ORDER — DEXTROSE MONOHYDRATE AND SODIUM CHLORIDE 5; .45 G/100ML; G/100ML
INJECTION, SOLUTION INTRAVENOUS CONTINUOUS
Status: CANCELLED | OUTPATIENT
Start: 2020-08-12

## 2020-08-12 RX ORDER — DIPHENHYDRAMINE HCL 25 MG
50 CAPSULE ORAL ONCE
Status: CANCELLED | OUTPATIENT
Start: 2020-08-12 | End: 2020-08-12

## 2020-08-17 ENCOUNTER — TELEPHONE (OUTPATIENT)
Dept: DERMATOLOGY | Facility: CLINIC | Age: 85
End: 2020-08-17

## 2020-08-17 ENCOUNTER — CLINICAL SUPPORT (OUTPATIENT)
Dept: INTERNAL MEDICINE | Facility: CLINIC | Age: 85
End: 2020-08-17
Payer: MEDICARE

## 2020-08-17 DIAGNOSIS — J30.9 CHRONIC ALLERGIC RHINITIS: ICD-10-CM

## 2020-08-17 PROCEDURE — 95115 PR IMMUNOTHERAPY, ONE INJECTION: ICD-10-PCS | Mod: HCNC,S$GLB,, | Performed by: FAMILY MEDICINE

## 2020-08-17 PROCEDURE — 95115 IMMUNOTHERAPY ONE INJECTION: CPT | Mod: HCNC,S$GLB,, | Performed by: FAMILY MEDICINE

## 2020-08-17 NOTE — TELEPHONE ENCOUNTER
----- Message from Richard Chance sent at 8/17/2020  3:07 PM CDT -----  Contact: self  Pt is asking for a call back in regards to rescheduling her injection appointment      Contact info-  [phone]

## 2020-08-18 ENCOUNTER — TELEPHONE (OUTPATIENT)
Dept: GASTROENTEROLOGY | Facility: CLINIC | Age: 85
End: 2020-08-18

## 2020-08-18 ENCOUNTER — NURSE TRIAGE (OUTPATIENT)
Dept: ADMINISTRATIVE | Facility: CLINIC | Age: 85
End: 2020-08-18

## 2020-08-18 NOTE — TELEPHONE ENCOUNTER
Reason for Disposition   [1] Follow-up call to recent contact AND [2] information only call, no triage required    Protocols used: INFORMATION ONLY CALL - NO TRIAGE-A-

## 2020-08-19 ENCOUNTER — TELEPHONE (OUTPATIENT)
Dept: GASTROENTEROLOGY | Facility: CLINIC | Age: 85
End: 2020-08-19

## 2020-08-19 RX ORDER — PRAVASTATIN SODIUM 40 MG/1
40 TABLET ORAL DAILY
Qty: 90 TABLET | Refills: 3 | Status: SHIPPED | OUTPATIENT
Start: 2020-08-19 | End: 2021-10-07

## 2020-08-19 NOTE — TELEPHONE ENCOUNTER
MA contacted pt ,because pt was requesting a sooner appt. Pt stated she will keep the appt she has , she has a lot of appt's in September and will be out of town in October.   MA will mail out appt reminder for pt to address on file, per pt.     ----- Message from Angle Franks sent at 8/17/2020  2:56 PM CDT -----  Regarding: Appointment  Contact: [phone]  Calling to reschedule appointment to an afternoon appointment after 10/25/2020. Please call and advise.

## 2020-08-25 ENCOUNTER — PES CALL (OUTPATIENT)
Dept: ADMINISTRATIVE | Facility: CLINIC | Age: 85
End: 2020-08-25

## 2020-08-26 ENCOUNTER — TELEPHONE (OUTPATIENT)
Dept: PHARMACY | Facility: CLINIC | Age: 85
End: 2020-08-26

## 2020-08-26 NOTE — TELEPHONE ENCOUNTER
Patient is going to bring her Social Security Award Letter and EOB on September 4 so we can apply with [facility]

## 2020-08-26 NOTE — PROGRESS NOTES
Outpatient Care Management  Plan of Care Follow Up Visit    Patient: Jenniffer Jimenez  MRN: [id number]  Date of Service: 08/11/2020  Completed by: Brisa Al RN  Referral Date: 06/26/2020  Program: Case Management (High Risk)    Reason for Visit   Patient presents with    OPCM Chart Review     8/17/20    OPCM RN First Follow-Up Attempt     8/24/20    OPCM RN Second Follow-Up Attempt     8/26/20    Update Plan Of Care     8/26/20       Brief Summary: Contacted patient to complete follow-up call for OPCM. Patient states that she is feeling good. She was scheduled to have a Watchman device implantation but was found to have a Left atrial appendage thrombus so procedure was rescheduled for a later date and patient was prescribed Eliquis. Patient states that she has financial struggles with the cost of Eliquis, she is currently paying $47.00/month. Referral has been placed for Pharmacy Assistance Program for any assistance with the medication costs. Patient continues to check BP and weight daily and continues to keep a log of results for MD appts. Patient is able to verbalize when she should contact MD for weight gains. She states that her weight has been consistent over the last few weeks, with today at 166 lbs. She denies any swelling or shortness of breath at this time. She continues to try to follow a low sodium diet but states that it can be difficult at times due to the types of food that [employer] provides. She tries to eat only the meat, salad, vegetables and fruit that they send. Patient states that she is taking a trip to [place], [place] for her nephew's wedding on 9/23/20 as long as she is feeling good. Reminded patient of upcoming appointments, verbalized awareness. Discussed completing follow up call with patient, who is in agreement with call in 2 weeks.        Patient Summary     Involvement of Care:  Do I have permission to speak with other family members about your care?  Yes    Patient  Reported Labs & Vitals:  1.  Any Patient Reported Labs & Vitals?  Yes  2.  Patient Reported Blood Pressure:  122/70  3.  Patient Reported Pulse:     4.  Patient Reported Weight (Kg):  166  5.  Patient Reported Blood Glucose (mg/dl):       Medical and social history was reviewed with patient and/or caregiver.     Clinical Assessment     Reviewed and provided basic information on available community resources for mental health, transportation, wellness resources, and palliative care programs with patient and/or caregiver.     Complex Care Plan     Care plan was discussed and completed today with input from patient and/or caregiver.    Patient Instructions     Instructions were provided via the [employer] patient [employer] and are available for the patient to view on the patient portal.    Follow-up call for OPCM scheduled on 9/8/20    [facility] OPCM Self-Management Care Plan was developed with the patients/caregivers input and was based on identified barriers from todays assessment.  Goals were written today with the patient/caregiver and the patient has agreed to work towards these goals to improve his/her overall well-being. Patient verbalized understanding of the care plan, goals, and all of today's instructions. Encouraged patient/caregiver to communicate with his/her physician and health care team about health conditions and the treatment plan.  Provided my contact information today and encouraged patient/caregiver to call me with any questions as needed.

## 2020-09-01 ENCOUNTER — TELEPHONE (OUTPATIENT)
Dept: INTERNAL MEDICINE | Facility: CLINIC | Age: 85
End: 2020-09-01

## 2020-09-01 DIAGNOSIS — Z12.31 VISIT FOR SCREENING MAMMOGRAM: Primary | ICD-10-CM

## 2020-09-01 NOTE — TELEPHONE ENCOUNTER
----- Message from Yumiko Cunningham sent at 9/1/2020  2:28 PM CDT -----  Contact: Jenniffer pardo [phone]  Type:  Needs Medical Advice    Who Called: Jenniffer pardo  Symptoms (please be specific):   How long has patient had these symptoms:    Pharmacy name and phone #:    Would the patient rather a call back or a response via [facility]? Call back  Best Call Back Number:  [phone]  Additional Information: Pt is requesting a call back from the nurse because she need orders in the computer to schedule her mammogram

## 2020-09-03 ENCOUNTER — PES CALL (OUTPATIENT)
Dept: ADMINISTRATIVE | Facility: CLINIC | Age: 85
End: 2020-09-03

## 2020-09-14 ENCOUNTER — CLINICAL SUPPORT (OUTPATIENT)
Dept: INTERNAL MEDICINE | Facility: CLINIC | Age: 85
End: 2020-09-14
Payer: MEDICARE

## 2020-09-14 DIAGNOSIS — J30.9 CHRONIC ALLERGIC RHINITIS: ICD-10-CM

## 2020-09-14 PROCEDURE — 99499 UNLISTED E&M SERVICE: CPT | Mod: HCNC,S$GLB,, | Performed by: ALLERGY & IMMUNOLOGY

## 2020-09-14 PROCEDURE — 99499 NO LOS: ICD-10-PCS | Mod: HCNC,S$GLB,, | Performed by: ALLERGY & IMMUNOLOGY

## 2020-09-14 PROCEDURE — 95115 IMMUNOTHERAPY ONE INJECTION: CPT | Mod: HCNC,S$GLB,, | Performed by: FAMILY MEDICINE

## 2020-09-14 PROCEDURE — 95115 PR IMMUNOTHERAPY, ONE INJECTION: ICD-10-PCS | Mod: HCNC,S$GLB,, | Performed by: FAMILY MEDICINE

## 2020-09-15 ENCOUNTER — PROCEDURE VISIT (OUTPATIENT)
Dept: OPHTHALMOLOGY | Facility: CLINIC | Age: 85
End: 2020-09-15
Payer: MEDICARE

## 2020-09-15 DIAGNOSIS — H35.3221 EXUDATIVE AGE-RELATED MACULAR DEGENERATION OF LEFT EYE WITH ACTIVE CHOROIDAL NEOVASCULARIZATION: Primary | ICD-10-CM

## 2020-09-15 DIAGNOSIS — H43.813 POSTERIOR VITREOUS DETACHMENT, BILATERAL: ICD-10-CM

## 2020-09-15 PROCEDURE — 92012 INTRM OPH EXAM EST PATIENT: CPT | Mod: 25,HCNC,S$GLB, | Performed by: OPHTHALMOLOGY

## 2020-09-15 PROCEDURE — 67028 PR INJECT INTRAVITREAL PHARMCOLOGIC: ICD-10-PCS | Mod: HCNC,LT,S$GLB, | Performed by: OPHTHALMOLOGY

## 2020-09-15 PROCEDURE — 67028 INJECTION EYE DRUG: CPT | Mod: HCNC,LT,S$GLB, | Performed by: OPHTHALMOLOGY

## 2020-09-15 PROCEDURE — 92012 PR EYE EXAM, EST PATIENT,INTERMED: ICD-10-PCS | Mod: 25,HCNC,S$GLB, | Performed by: OPHTHALMOLOGY

## 2020-09-15 RX ADMIN — Medication 1.25 MG: at 02:09

## 2020-09-15 NOTE — PROGRESS NOTES
HPI     Eye Problem      Additional comments: BRVO OS              Comments     DLS: 08/11/2020 Dr. Hernandez    Patient states her vision has been stable since her last visit.   (+)floaters.       OCT - OD - no ME  OS - SRF      A/P    1. Wet AMD OS  PPCNVM  S/p Avastin OS x 3    Avastin OS today    2. Prior CRVO OS  Stable    3. PCIOL OU    4. S/p strab sx    5. HTN Ret OU  BP control      1 month OCT and dilate OU      Risks, benefits, and alternatives to treatment discussed in detail with the patient.  The patient voiced understanding and wished to proceed with the procedure    Injection Procedure Note:  Diagnosis: Wet AMD OS    Patient Identified and Time Out complete  Pt Prefers to be marked with sticker rather than ink marker (OHS.Qual.003 #5)  Topical Proparacaine and Betadine.  Inject Avastin OS at 6:00 @ 3.5-4mm posterior to limbus  Post Operative Dx: Same  Complications: None  Follow up as above.

## 2020-09-15 NOTE — PATIENT INSTRUCTIONS

## 2020-09-17 ENCOUNTER — OUTPATIENT CASE MANAGEMENT (OUTPATIENT)
Dept: ADMINISTRATIVE | Facility: OTHER | Age: 85
End: 2020-09-17

## 2020-09-17 NOTE — LETTER
September 29, 2020    Jenniffer Jimenez  [address] [place] [address] [place] [address]             [facility] [address] [place] [address] Dear Ms Toyn,    I work with [facility] Outpatient Case Management Department. I have been unsuccessful at reaching you to follow-up to see how you have been doing. Please call me back at your earliest convenience to discuss your health care needs.      I can be reached at [phone]  from 8:00AM to 4:30 PM on Monday thru Friday. [facility] is a program offered through [facility] where a nurse is available 24/7 to answer questions or provide medical advice, their number is [phone].    Thanks,      Brisa Al, RN Case Manager  Outpatient Case Management

## 2020-10-02 ENCOUNTER — PATIENT OUTREACH (OUTPATIENT)
Dept: ADMINISTRATIVE | Facility: OTHER | Age: 85
End: 2020-10-02

## 2020-10-06 ENCOUNTER — DOCUMENTATION ONLY (OUTPATIENT)
Dept: CARDIOLOGY | Facility: CLINIC | Age: 85
End: 2020-10-06

## 2020-10-06 ENCOUNTER — LAB VISIT (OUTPATIENT)
Dept: LAB | Facility: HOSPITAL | Age: 85
End: 2020-10-06
Payer: MEDICARE

## 2020-10-06 ENCOUNTER — LAB VISIT (OUTPATIENT)
Dept: SURGERY | Facility: CLINIC | Age: 85
End: 2020-10-06
Payer: MEDICARE

## 2020-10-06 ENCOUNTER — OFFICE VISIT (OUTPATIENT)
Dept: CARDIOLOGY | Facility: CLINIC | Age: 85
End: 2020-10-06
Payer: MEDICARE

## 2020-10-06 ENCOUNTER — ANESTHESIA EVENT (OUTPATIENT)
Dept: MEDSURG UNIT | Facility: HOSPITAL | Age: 85
DRG: 274 | End: 2020-10-06
Payer: MEDICARE

## 2020-10-06 VITALS
BODY MASS INDEX: 26.03 KG/M2 | WEIGHT: 162 LBS | DIASTOLIC BLOOD PRESSURE: 67 MMHG | OXYGEN SATURATION: 100 % | HEART RATE: 66 BPM | SYSTOLIC BLOOD PRESSURE: 143 MMHG | HEIGHT: 66 IN

## 2020-10-06 DIAGNOSIS — Z86.73 HISTORY OF TIA (TRANSIENT ISCHEMIC ATTACK): Primary | ICD-10-CM

## 2020-10-06 DIAGNOSIS — N18.2 STAGE 2 CHRONIC KIDNEY DISEASE: ICD-10-CM

## 2020-10-06 DIAGNOSIS — I48.0 PAROXYSMAL ATRIAL FIBRILLATION: ICD-10-CM

## 2020-10-06 DIAGNOSIS — I48.20 CHRONIC ATRIAL FIBRILLATION: ICD-10-CM

## 2020-10-06 DIAGNOSIS — I48.91 ATRIAL FIBRILLATION: ICD-10-CM

## 2020-10-06 DIAGNOSIS — I50.32 CHRONIC DIASTOLIC HEART FAILURE: ICD-10-CM

## 2020-10-06 DIAGNOSIS — I10 ESSENTIAL HYPERTENSION: Chronic | ICD-10-CM

## 2020-10-06 DIAGNOSIS — N18.31 STAGE 3A CHRONIC KIDNEY DISEASE: ICD-10-CM

## 2020-10-06 LAB
ANION GAP SERPL CALC-SCNC: 7 MMOL/L (ref 8–16)
APTT BLDCRRT: 26.8 SEC (ref 21–32)
BASOPHILS # BLD AUTO: 0.03 K/UL (ref 0–0.2)
BASOPHILS NFR BLD: 0.9 % (ref 0–1.9)
BUN SERPL-MCNC: 15 MG/DL (ref 8–23)
CALCIUM SERPL-MCNC: 8.9 MG/DL (ref 8.7–10.5)
CHLORIDE SERPL-SCNC: 102 MMOL/L (ref 95–110)
CO2 SERPL-SCNC: 29 MMOL/L (ref 23–29)
CREAT SERPL-MCNC: 0.9 MG/DL (ref 0.5–1.4)
DIFFERENTIAL METHOD: ABNORMAL
EOSINOPHIL # BLD AUTO: 0 K/UL (ref 0–0.5)
EOSINOPHIL NFR BLD: 1.2 % (ref 0–8)
ERYTHROCYTE [DISTWIDTH] IN BLOOD BY AUTOMATED COUNT: 14.8 % (ref 11.5–14.5)
EST. GFR  (AFRICAN AMERICAN): >60 ML/MIN/1.73 M^2
EST. GFR  (NON AFRICAN AMERICAN): 57.3 ML/MIN/1.73 M^2
GLUCOSE SERPL-MCNC: 103 MG/DL (ref 70–110)
HCT VFR BLD AUTO: 31.8 % (ref 37–48.5)
HGB BLD-MCNC: 10.4 G/DL (ref 12–16)
IMM GRANULOCYTES # BLD AUTO: 0.02 K/UL (ref 0–0.04)
IMM GRANULOCYTES NFR BLD AUTO: 0.6 % (ref 0–0.5)
INR PPP: 1 (ref 0.8–1.2)
LYMPHOCYTES # BLD AUTO: 0.8 K/UL (ref 1–4.8)
LYMPHOCYTES NFR BLD: 22.8 % (ref 18–48)
MCH RBC QN AUTO: 30.7 PG (ref 27–31)
MCHC RBC AUTO-ENTMCNC: 32.7 G/DL (ref 32–36)
MCV RBC AUTO: 94 FL (ref 82–98)
MONOCYTES # BLD AUTO: 0.3 K/UL (ref 0.3–1)
MONOCYTES NFR BLD: 8.6 % (ref 4–15)
NEUTROPHILS # BLD AUTO: 2.2 K/UL (ref 1.8–7.7)
NEUTROPHILS NFR BLD: 65.9 % (ref 38–73)
NRBC BLD-RTO: 0 /100 WBC
PLATELET # BLD AUTO: 173 K/UL (ref 150–350)
PMV BLD AUTO: 10.4 FL (ref 9.2–12.9)
POTASSIUM SERPL-SCNC: 4.6 MMOL/L (ref 3.5–5.1)
PROTHROMBIN TIME: 10.6 SEC (ref 9–12.5)
RBC # BLD AUTO: 3.39 M/UL (ref 4–5.4)
SARS-COV-2 RNA RESP QL NAA+PROBE: NOT DETECTED
SODIUM SERPL-SCNC: 138 MMOL/L (ref 136–145)
WBC # BLD AUTO: 3.37 K/UL (ref 3.9–12.7)

## 2020-10-06 PROCEDURE — 99499 UNLISTED E&M SERVICE: CPT | Mod: S$GLB,,, | Performed by: INTERNAL MEDICINE

## 2020-10-06 PROCEDURE — 1159F MED LIST DOCD IN RCRD: CPT | Mod: HCNC,S$GLB,, | Performed by: INTERNAL MEDICINE

## 2020-10-06 PROCEDURE — 85730 THROMBOPLASTIN TIME PARTIAL: CPT | Mod: HCNC

## 2020-10-06 PROCEDURE — 1159F PR MEDICATION LIST DOCUMENTED IN MEDICAL RECORD: ICD-10-PCS | Mod: HCNC,S$GLB,, | Performed by: INTERNAL MEDICINE

## 2020-10-06 PROCEDURE — 99215 PR OFFICE/OUTPT VISIT, EST, LEVL V, 40-54 MIN: ICD-10-PCS | Mod: HCNC,S$GLB,, | Performed by: INTERNAL MEDICINE

## 2020-10-06 PROCEDURE — 1101F PR PT FALLS ASSESS DOC 0-1 FALLS W/OUT INJ PAST YR: ICD-10-PCS | Mod: HCNC,CPTII,S$GLB, | Performed by: INTERNAL MEDICINE

## 2020-10-06 PROCEDURE — 85025 COMPLETE CBC W/AUTO DIFF WBC: CPT | Mod: HCNC

## 2020-10-06 PROCEDURE — 1125F PR PAIN SEVERITY QUANTIFIED, PAIN PRESENT: ICD-10-PCS | Mod: HCNC,S$GLB,, | Performed by: INTERNAL MEDICINE

## 2020-10-06 PROCEDURE — 36415 COLL VENOUS BLD VENIPUNCTURE: CPT | Mod: HCNC

## 2020-10-06 PROCEDURE — 85610 PROTHROMBIN TIME: CPT | Mod: HCNC

## 2020-10-06 PROCEDURE — U0003 INFECTIOUS AGENT DETECTION BY NUCLEIC ACID (DNA OR RNA); SEVERE ACUTE RESPIRATORY SYNDROME CORONAVIRUS 2 (SARS-COV-2) (CORONAVIRUS DISEASE [COVID-19]), AMPLIFIED PROBE TECHNIQUE, MAKING USE OF HIGH THROUGHPUT TECHNOLOGIES AS DESCRIBED BY CMS-2020-01-R: HCPCS | Mod: HCNC

## 2020-10-06 PROCEDURE — 1101F PT FALLS ASSESS-DOCD LE1/YR: CPT | Mod: HCNC,CPTII,S$GLB, | Performed by: INTERNAL MEDICINE

## 2020-10-06 PROCEDURE — 99999 PR PBB SHADOW E&M-EST. PATIENT-LVL IV: ICD-10-PCS | Mod: PBBFAC,HCNC,, | Performed by: INTERNAL MEDICINE

## 2020-10-06 PROCEDURE — 99999 PR PBB SHADOW E&M-EST. PATIENT-LVL IV: CPT | Mod: PBBFAC,HCNC,, | Performed by: INTERNAL MEDICINE

## 2020-10-06 PROCEDURE — 99215 OFFICE O/P EST HI 40 MIN: CPT | Mod: HCNC,S$GLB,, | Performed by: INTERNAL MEDICINE

## 2020-10-06 PROCEDURE — 80048 BASIC METABOLIC PNL TOTAL CA: CPT | Mod: HCNC

## 2020-10-06 PROCEDURE — 1125F AMNT PAIN NOTED PAIN PRSNT: CPT | Mod: HCNC,S$GLB,, | Performed by: INTERNAL MEDICINE

## 2020-10-06 PROCEDURE — 99499 RISK ADDL DX/OHS AUDIT: ICD-10-PCS | Mod: S$GLB,,, | Performed by: INTERNAL MEDICINE

## 2020-10-06 NOTE — H&P (VIEW-ONLY)
Patient MRN: [id number]  Patient : 1932  PCP: Rubi Young DO    CC: Follow up Watchman     History of Present Illness:   Jeninffer Jimenez is a 88 y.o. y.o. female who presents for consultation for Watchman follow up.     This is an established patient for me presenting with an established problem of AF. Her other chronic medical conditions include CKD, history of CVA.  Patient initially presented for outpatient Watchman device on 2020.  On SONYA noted to have thrombus in left atrial appendage and when she was on Coumadin.  She was transition to Eliquis at that time.  She presents as an outpatient today to clinic for evaluation for Watchman as planned initially for 10/07/2020.  Patient was instructed to stop her Eliquis on 10/02/2020 so she has been off for the past 4 days.  She denies history of bleeding in notes medication compliance she is continued on aspirin 81 mg daily.    Due to concern for FERNANDA thrombus, patient will need to be continued on Eliquis without stopping. This presents a higher bleeding risk but this is an assumed risk given her risk of thrombus.     This is the extent of the patient's complaints at this time. Patient queried and denies any further complaint.     Past Medical History:     Past Medical History:   Diagnosis Date    Amblyopia of left eye 4/10/2013    Anticoagulant long-term use     Coumadin - A-Fib    Anxiety     Arthritis     Facet arthropathy, Lumbosacral    Atherosclerosis of aorta     Atrial fibrillation     Auricular fibrillation     Central retinal vein occlusion of left eye     CHF (congestive heart failure)     Chronic kidney disease, stage 3     Coronary artery disease     Depression     Diastolic heart failure 2014    Exotropia of both eyes 2013    recession RSR 5.0mm w/ adj; recession LR os 5.0 w/ adj; resect MR os  4.0mm    Hearing loss     History of resection of small bowel     Hyperlipidemia     Hypertension      Hypertensive retinopathy of both eyes     Hypoglycemia     Macular degeneration     Meniere's disease of both ears     OA (osteoarthritis) of shoulder     Right    Osteoporosis     Other and unspecified hyperlipidemia     Posterior vitreous detachment of both eyes     Rhinitis     TIA (transient ischemic attack)        Past Surgical History:     Past Surgical History:   Procedure Laterality Date    APPENDECTOMY      CARDIAC CATHETERIZATION      CATARACT EXTRACTION W/  INTRAOCULAR LENS IMPLANT Bilateral      SECTION, CLASSIC      HYSTERECTOMY      INNER EAR SURGERY      JOINT REPLACEMENT      LEFT KNEE REPLACEMENT IN 2013 -    OOPHORECTOMY      SINUS SURGERY      STRABISMUS SURGERY  13    RSR recession 5 mm, LLR recession 5 mm and LMR resection 4mm    STRABISMUS SURGERY  2014    recess LR OD 6mm    TONSILLECTOMY         Social History:     Social History     Tobacco Use    Smoking status: Former Smoker     Types: Cigarettes     Quit date: 10/29/1982     Years since quittin.9    Smokeless tobacco: Never Used   Substance Use Topics    Alcohol use: Not Currently     Alcohol/week: 0.0 standard drinks     Comment: socially       Family History:     Family History   Problem Relation Age of Onset    Hypertension Mother     Hypertension Father     Liver disease Sister     Diabetes Sister     Heart disease Sister     Diabetes Brother     Breast cancer Maternal Aunt     No Known Problems Maternal Uncle     No Known Problems Paternal Aunt     No Known Problems Paternal Uncle     No Known Problems Maternal Grandmother     No Known Problems Maternal Grandfather     No Known Problems Paternal Grandmother     No Known Problems Paternal Grandfather     No Known Problems Daughter     No Known Problems Son     Heart disease Sister     No Known Problems Son     No Known Problems Son     Cancer Neg Hx         in first degree relatives    Melanoma Neg Hx     Psoriasis  "Neg Hx     Lupus Neg Hx     Amblyopia Neg Hx     Blindness Neg Hx     Cataracts Neg Hx     Glaucoma Neg Hx     Macular degeneration Neg Hx     Retinal detachment Neg Hx     Strabismus Neg Hx     Stroke Neg Hx     Thyroid disease Neg Hx        Allergies:     Review of patient's allergies indicates:   Allergen Reactions    Bactrim [sulfamethoxazole-trimethoprim] Other (See Comments)     "Couldn't walk"    Opioids - morphine analogues Other (See Comments)     Bowel issues; bowel obstruction    Tizanidine Other (See Comments)     "Lips were numb,  Almost passed out."    Tramadol Hallucinations    Beta-blockers (beta-adrenergic blocking agts) Other (See Comments)     Can not go on beta blockers for long period of time - due to taking allergy injections    Phenergan [promethazine] Other (See Comments)     Per pt. request- saw sisters have bad reaction to drug       Review of Systems:   Review of Systems   Constitutional: Negative for chills, fever and weight loss.   HENT: Negative for hearing loss and sore throat.    Eyes: Negative for blurred vision.   Respiratory: Negative for cough and shortness of breath.    Cardiovascular: Negative for chest pain, palpitations, orthopnea, leg swelling and PND.   Gastrointestinal: Negative for abdominal pain, constipation, diarrhea, heartburn, nausea and vomiting.   Genitourinary: Negative for dysuria.   Musculoskeletal: Negative for neck pain.   Skin: Negative for rash.   Neurological: Negative for dizziness and headaches.   Endo/Heme/Allergies: Does not bruise/bleed easily.   Psychiatric/Behavioral: Negative for substance abuse.       All other systems reviewed and are negative.   Medications:     Current Outpatient Medications on File Prior to Visit   Medication Sig Dispense Refill    apixaban (ELIQUIS) 5 mg Tab Take 1 tablet (5 mg total) by mouth 2 (two) times daily. 60 tablet 11    aspirin (ECOTRIN) 81 MG EC tablet Take 1 tablet (81 mg total) by mouth once " "daily. 360 tablet 0    cholecalciferol, vitamin D3, (VITAMIN D3 ORAL) Take 1 tablet by mouth once daily.      diclofenac sodium (VOLTAREN) 1 % Gel Apply 2 g topically 3 (three) times daily. Apply to ankle for pain 1 Tube 0    fluticasone propionate (FLONASE) 50 mcg/actuation nasal spray USE 1 SPRAY IN EACH NOSTRIL ONE TIME DAILY 32 g 11    furosemide (LASIX) 20 MG tablet TAKE 1 TABLET BY MOUTH DAILY; MAY TAKE AN ADDITIONAL TABLET AS NEEDED FOR SWELLING. 100 tablet 3    multivitamin (ONE DAILY MULTIVITAMIN) per tablet Take 1 tablet by mouth once daily.      mupirocin (BACTROBAN) 2 % ointment Apply to affected area 3 times daily 22 g 1    peg 400-propylene glycol (SYSTANE) 0.4-0.3 % Drop Place 1-2 drops into both eyes as needed.       potassium chloride (MICRO-K) 10 MEQ CpSR TAKE 1 CAPSULE EVERY DAY 90 capsule 3    pravastatin (PRAVACHOL) 40 MG tablet Take 1 tablet (40 mg total) by mouth once daily. 90 tablet 3    triamcinolone (NASACORT) 55 mcg nasal inhaler 2 sprays by Nasal route once daily. 17 g 0     No current facility-administered medications on file prior to visit.        Physical Exam:   BP (!) 143/67 (BP Location: Right arm, Patient Position: Sitting, BP Method: Large (Automatic))   Pulse 66   Ht 5' 6" (1.676 m)   Wt 73.5 kg (162 lb)   LMP  (LMP Unknown)   SpO2 100%   BMI 26.15 kg/m²   Physical Exam   Constitutional: She is oriented to person, place, and time and well-developed, well-nourished, and in no distress.   HENT:   Head: Normocephalic and atraumatic.   Eyes: Conjunctivae are normal.   Neck: Normal range of motion. Neck supple.   Cardiovascular: Normal rate, regular rhythm and normal heart sounds. Exam reveals no gallop and no friction rub.   No murmur heard.  Pulmonary/Chest: Effort normal and breath sounds normal. No respiratory distress. She has no wheezes. She has no rales. She exhibits no tenderness.   Abdominal: Soft. Bowel sounds are normal. She exhibits no distension and no " mass. There is no abdominal tenderness. There is no rebound and no guarding.   Musculoskeletal: Normal range of motion.         General: No edema.   Neurological: She is alert and oriented to person, place, and time.   Skin: Skin is warm and dry.   Psychiatric: Mood normal.   Vitals reviewed.      Labs:     Lab Results   Component Value Date    WBC 3.37 (L) 10/06/2020    HGB 10.4 (L) 10/06/2020    HCT 31.8 (L) 10/06/2020     10/06/2020    GRAN 2.2 10/06/2020    GRAN 65.9 10/06/2020        Chemistry        Component Value Date/Time     10/06/2020 1003    K 4.6 10/06/2020 1003     10/06/2020 1003    CO2 29 10/06/2020 1003    BUN 15 10/06/2020 1003    CREATININE 0.9 10/06/2020 1003     10/06/2020 1003        Component Value Date/Time    CALCIUM 8.9 10/06/2020 1003    ALKPHOS 103 06/26/2020 0415    AST 18 06/26/2020 0415    ALT 12 06/26/2020 0415    BILITOT 0.7 06/26/2020 0415    ESTGFRAFRICA >60.0 10/06/2020 1003    EGFRNONAA 57.3 (A) 10/06/2020 1003          Lab Results   Component Value Date    ALT 12 06/26/2020    AST 18 06/26/2020    ALKPHOS 103 06/26/2020    BILITOT 0.7 06/26/2020      Lab Results   Component Value Date    HGBA1C 5.6 06/25/2020     Lab Results   Component Value Date     (H) 01/01/2020     (H) 01/24/2018     (H) 01/24/2017     Lab Results   Component Value Date    CHOL 206 (H) 06/25/2020    HDL 74 06/25/2020    LDLCALC 114.2 06/25/2020    TRIG 89 06/25/2020     Lab Results   Component Value Date    INR 1.0 10/06/2020    INR 1.3 (H) 08/05/2020    INR 2.4 (H) 07/30/2020        I have reviewed all pertinent labs within the past 24 hours.    Cardiovascular Imaging:   Echo: No results found for: EF  2D echo with color flow doppler:   Results for orders placed or performed in visit on 05/19/17   2D echo with color flow doppler   Result Value Ref Range    QEF 55 55 - 65    Mitral Valve Regurgitation TRIVIAL TO MILD     Est. PA Systolic Pressure 36.3      Tricuspid Valve Regurgitation MILD     Narrative    Date of Procedure: 05/19/2017        TEST DESCRIPTION       Aorta: The aortic root is normal in size, measuring 3.1 cm at sinotubular junction and 3.0 cm at Sinuses of Valsalva. The proximal ascending aorta is normal in size, measuring 3.0 cm across.     Left Atrium: The left atrial volume index is severely enlarged, measuring 50.62 cc/m2.     Left Ventricle: The left ventricle is normal in size, with an end-diastolic diameter of 5.0 cm, and an end-systolic diameter of 3.5 cm. LV wall thickness is normal, with the septum and the posterior wall each measuring 0.8 cm across. Relative wall   thickness was normal at 0.32, and the LV mass index was 81.4 g/m2 consistent with normal left ventricular mass. There are no regional wall motion abnormalities. Left ventricular systolic function appears normal. Visually estimated ejection fraction is   55-60%. The LV Doppler derived stroke volume equals 42.0 ccs.     Diastolic indices: Mitral inflow pattern reveals fusion of E & A waves. E wave velocity 1.4 m/s, E/A ratio 4.0,  msec., E/e' ratio(sep) 17. Diastolic function is indeterminate.     Right Atrium: The right atrium is upper limit of normal, measuring 6.1 cm in length and 4.2 cm in width in the apical view. The end-systolic right atrial area is 20.0 cm2.     Right Ventricle: The right ventricle is normal in size measuring 4.0 cm at the base in the apical right ventricle-focused view. Global right ventricular systolic function appears normal. Tricuspid annular plane systolic excursion (TAPSE) is 1.1 cm. The   estimated PA systolic pressure is 36 mmHg.     Aortic Valve:  The aortic valve is mildly sclerotic. The aortic valve is tri-leaflet in structure.     Mitral Valve:  The mitral valve is mildly sclerotic. There is trivial to mild mitral regurgitation. There is mitral annular calcification.     Tricuspid Valve:  There is mild tricuspid regurgitation. Tricuspid  valve is normal in structure with normal leaflet mobility.     Pulmonary Valve:  There is trivial pulmonic regurgitation. Pulmonary valve is normal in structure with normal leaflet mobility.     IVC: IVC is enlarged but collapses > 50% with a sniff, suggesting intermediate right atrial pressure of 8 mmHg.     Intracavitary: There is no evidence of pericardial effusion, intracavity mass, thrombi, or vegetation.         CONCLUSIONS     1 - Normal left ventricular systolic function (EF 55-60%).     2 - Normal right ventricular systolic function .     3 - Indeterminate LV diastolic function.     4 - The estimated PA systolic pressure is 36 mmHg.     5 - Trivial to mild mitral regurgitation.     6 - Mild tricuspid regurgitation.     7 - Trivial pulmonic regurgitation.     8 - Intermediate central venous pressure.             This document has been electronically    SIGNED BY: Elo Groves MD On: 05/19/2017 11:29    This document was originally electronically signed on: 05/19/2017 11:06    and Transthoracic echo (TTE) complete (Cupid Only): No results found. However, due to the size of the patient record, not all encounters were searched. Please check Results Review for a complete set of results.    Test(s) Reviewed  I have reviewed the following in detail:  [] Stress test   [] Angiography   [x] Echocardiogram   [x] Labs   [] Other:       Assessment & Plan:   The or sumed risk.  Patient had left atrial appendage thrombus on prior SONYA she was given without tears for medication assistance for her Eliquis.  Patient was consented inclinic appointment today.    -The risks, benefits & alternatives of the procedure were explained to the patient.    -The risks of Watchman include but are not limited to: Bleeding, infection, heart rhythm abnormalities, allergic reactions, kidney injury, stroke and death.    -The risks of moderate sedation include hypotension, respiratory depression, arrhythmias, bronchospasm, & death.     -Informed consent was obtained & the patient is agreeable to proceed with the procedure.    Usman HOOKER Knight - Pager# [phone]  10/6/2020  11:54 AM  Cardiovascular Fellow  [facility]   Interventional Cardiology Staff  I have personally taken the history and examined this patient. I have discussed and agree with the resident's findings and plan as documented in the resident's note.          Chronic atrial fibrillation  Severe chronic atrial fibrillation- Chronic AFib since 2016, on Coumadin  - Labile INR's recently, balance issues  - CHADS-VASc = 7, HASBLED = 5  - Cardiac CT completed for appendage dimensions  - Plan for LAAO (Watchman), will obtain SONYA at the time of the procedure  - Anti-platelet Therapy: ASA 81 mg Daily, on warfarin  - Access: R CFV  - Creatinine/CrCl: 0.8 on 6/26/2020  - Allergies: No shellfish/Iodine allergy  - Pre-Hydration: 3 cc/kg/hr IV, continuous, for 1 hour, Pre-Procedure  - Pre-Op Med: Diphenhydramine (Benadryl) 50 mg, Oral, Once, Pre-Procedure   - All patient's questions were answered      Hypertension  - Well controlled in clinic today  - Continue furosemide 20 mg Daily    Chronic kidney disease, stage III (moderate)  Will not perform CTA for LAAO sizing.                                                   \Abundio Curtis

## 2020-10-06 NOTE — ASSESSMENT & PLAN NOTE
Severe chronic atrial fibrillation- Chronic AFib since 2016, on Coumadin  - Labile INR's recently, balance issues  - CHADS-VASc = 7, HASBLED = 5  - Cardiac CT completed for appendage dimensions  - Plan for LAAO (Watchman), will obtain SONYA at the time of the procedure  - Anti-platelet Therapy: ASA 81 mg Daily, on warfarin  - Access: R CFV  - Creatinine/CrCl: 0.8 on 6/26/2020  - Allergies: No shellfish/Iodine allergy  - Pre-Hydration: 3 cc/kg/hr IV, continuous, for 1 hour, Pre-Procedure  - Pre-Op Med: Diphenhydramine (Benadryl) 50 mg, Oral, Once, Pre-Procedure   - All patient's questions were answered

## 2020-10-06 NOTE — PROGRESS NOTES
Patient MRN: [id number]  Patient : 1932  PCP: Rubi Young DO    CC: Follow up Watchman     History of Present Illness:   Jenniffer Jimenez is a 88 y.o. y.o. female who presents for consultation for Watchman follow up.     This is an established patient for me presenting with an established problem of AF. Her other chronic medical conditions include CKD, history of CVA.  Patient initially presented for outpatient Watchman device on 2020.  On SONYA noted to have thrombus in left atrial appendage and when she was on Coumadin.  She was transition to Eliquis at that time.  She presents as an outpatient today to clinic for evaluation for Watchman as planned initially for 10/07/2020.  Patient was instructed to stop her Eliquis on 10/02/2020 so she has been off for the past 4 days.  She denies history of bleeding in notes medication compliance she is continued on aspirin 81 mg daily.    Due to concern for FERNANDA thrombus, patient will need to be continued on Eliquis without stopping. This presents a higher bleeding risk but this is an assumed risk given her risk of thrombus.     This is the extent of the patient's complaints at this time. Patient queried and denies any further complaint.     Past Medical History:     Past Medical History:   Diagnosis Date    Amblyopia of left eye 4/10/2013    Anticoagulant long-term use     Coumadin - A-Fib    Anxiety     Arthritis     Facet arthropathy, Lumbosacral    Atherosclerosis of aorta     Atrial fibrillation     Auricular fibrillation     Central retinal vein occlusion of left eye     CHF (congestive heart failure)     Chronic kidney disease, stage 3     Coronary artery disease     Depression     Diastolic heart failure 2014    Exotropia of both eyes 2013    recession RSR 5.0mm w/ adj; recession LR os 5.0 w/ adj; resect MR os  4.0mm    Hearing loss     History of resection of small bowel     Hyperlipidemia     Hypertension      Hypertensive retinopathy of both eyes     Hypoglycemia     Macular degeneration     Meniere's disease of both ears     OA (osteoarthritis) of shoulder     Right    Osteoporosis     Other and unspecified hyperlipidemia     Posterior vitreous detachment of both eyes     Rhinitis     TIA (transient ischemic attack)        Past Surgical History:     Past Surgical History:   Procedure Laterality Date    APPENDECTOMY      CARDIAC CATHETERIZATION      CATARACT EXTRACTION W/  INTRAOCULAR LENS IMPLANT Bilateral      SECTION, CLASSIC      HYSTERECTOMY      INNER EAR SURGERY      JOINT REPLACEMENT      LEFT KNEE REPLACEMENT IN 2013 -    OOPHORECTOMY      SINUS SURGERY      STRABISMUS SURGERY  13    RSR recession 5 mm, LLR recession 5 mm and LMR resection 4mm    STRABISMUS SURGERY  2014    recess LR OD 6mm    TONSILLECTOMY         Social History:     Social History     Tobacco Use    Smoking status: Former Smoker     Types: Cigarettes     Quit date: 10/29/1982     Years since quittin.9    Smokeless tobacco: Never Used   Substance Use Topics    Alcohol use: Not Currently     Alcohol/week: 0.0 standard drinks     Comment: socially       Family History:     Family History   Problem Relation Age of Onset    Hypertension Mother     Hypertension Father     Liver disease Sister     Diabetes Sister     Heart disease Sister     Diabetes Brother     Breast cancer Maternal Aunt     No Known Problems Maternal Uncle     No Known Problems Paternal Aunt     No Known Problems Paternal Uncle     No Known Problems Maternal Grandmother     No Known Problems Maternal Grandfather     No Known Problems Paternal Grandmother     No Known Problems Paternal Grandfather     No Known Problems Daughter     No Known Problems Son     Heart disease Sister     No Known Problems Son     No Known Problems Son     Cancer Neg Hx         in first degree relatives    Melanoma Neg Hx     Psoriasis  "Neg Hx     Lupus Neg Hx     Amblyopia Neg Hx     Blindness Neg Hx     Cataracts Neg Hx     Glaucoma Neg Hx     Macular degeneration Neg Hx     Retinal detachment Neg Hx     Strabismus Neg Hx     Stroke Neg Hx     Thyroid disease Neg Hx        Allergies:     Review of patient's allergies indicates:   Allergen Reactions    Bactrim [sulfamethoxazole-trimethoprim] Other (See Comments)     "Couldn't walk"    Opioids - morphine analogues Other (See Comments)     Bowel issues; bowel obstruction    Tizanidine Other (See Comments)     "Lips were numb,  Almost passed out."    Tramadol Hallucinations    Beta-blockers (beta-adrenergic blocking agts) Other (See Comments)     Can not go on beta blockers for long period of time - due to taking allergy injections    Phenergan [promethazine] Other (See Comments)     Per pt. request- saw sisters have bad reaction to drug       Review of Systems:   Review of Systems   Constitutional: Negative for chills, fever and weight loss.   HENT: Negative for hearing loss and sore throat.    Eyes: Negative for blurred vision.   Respiratory: Negative for cough and shortness of breath.    Cardiovascular: Negative for chest pain, palpitations, orthopnea, leg swelling and PND.   Gastrointestinal: Negative for abdominal pain, constipation, diarrhea, heartburn, nausea and vomiting.   Genitourinary: Negative for dysuria.   Musculoskeletal: Negative for neck pain.   Skin: Negative for rash.   Neurological: Negative for dizziness and headaches.   Endo/Heme/Allergies: Does not bruise/bleed easily.   Psychiatric/Behavioral: Negative for substance abuse.       All other systems reviewed and are negative.   Medications:     Current Outpatient Medications on File Prior to Visit   Medication Sig Dispense Refill    apixaban (ELIQUIS) 5 mg Tab Take 1 tablet (5 mg total) by mouth 2 (two) times daily. 60 tablet 11    aspirin (ECOTRIN) 81 MG EC tablet Take 1 tablet (81 mg total) by mouth once " "daily. 360 tablet 0    cholecalciferol, vitamin D3, (VITAMIN D3 ORAL) Take 1 tablet by mouth once daily.      diclofenac sodium (VOLTAREN) 1 % Gel Apply 2 g topically 3 (three) times daily. Apply to ankle for pain 1 Tube 0    fluticasone propionate (FLONASE) 50 mcg/actuation nasal spray USE 1 SPRAY IN EACH NOSTRIL ONE TIME DAILY 32 g 11    furosemide (LASIX) 20 MG tablet TAKE 1 TABLET BY MOUTH DAILY; MAY TAKE AN ADDITIONAL TABLET AS NEEDED FOR SWELLING. 100 tablet 3    multivitamin (ONE DAILY MULTIVITAMIN) per tablet Take 1 tablet by mouth once daily.      mupirocin (BACTROBAN) 2 % ointment Apply to affected area 3 times daily 22 g 1    peg 400-propylene glycol (SYSTANE) 0.4-0.3 % Drop Place 1-2 drops into both eyes as needed.       potassium chloride (MICRO-K) 10 MEQ CpSR TAKE 1 CAPSULE EVERY DAY 90 capsule 3    pravastatin (PRAVACHOL) 40 MG tablet Take 1 tablet (40 mg total) by mouth once daily. 90 tablet 3    triamcinolone (NASACORT) 55 mcg nasal inhaler 2 sprays by Nasal route once daily. 17 g 0     No current facility-administered medications on file prior to visit.        Physical Exam:   BP (!) 143/67 (BP Location: Right arm, Patient Position: Sitting, BP Method: Large (Automatic))   Pulse 66   Ht 5' 6" (1.676 m)   Wt 73.5 kg (162 lb)   LMP  (LMP Unknown)   SpO2 100%   BMI 26.15 kg/m²   Physical Exam   Constitutional: She is oriented to person, place, and time and well-developed, well-nourished, and in no distress.   HENT:   Head: Normocephalic and atraumatic.   Eyes: Conjunctivae are normal.   Neck: Normal range of motion. Neck supple.   Cardiovascular: Normal rate, regular rhythm and normal heart sounds. Exam reveals no gallop and no friction rub.   No murmur heard.  Pulmonary/Chest: Effort normal and breath sounds normal. No respiratory distress. She has no wheezes. She has no rales. She exhibits no tenderness.   Abdominal: Soft. Bowel sounds are normal. She exhibits no distension and no " mass. There is no abdominal tenderness. There is no rebound and no guarding.   Musculoskeletal: Normal range of motion.         General: No edema.   Neurological: She is alert and oriented to person, place, and time.   Skin: Skin is warm and dry.   Psychiatric: Mood normal.   Vitals reviewed.      Labs:     Lab Results   Component Value Date    WBC 3.37 (L) 10/06/2020    HGB 10.4 (L) 10/06/2020    HCT 31.8 (L) 10/06/2020     10/06/2020    GRAN 2.2 10/06/2020    GRAN 65.9 10/06/2020        Chemistry        Component Value Date/Time     10/06/2020 1003    K 4.6 10/06/2020 1003     10/06/2020 1003    CO2 29 10/06/2020 1003    BUN 15 10/06/2020 1003    CREATININE 0.9 10/06/2020 1003     10/06/2020 1003        Component Value Date/Time    CALCIUM 8.9 10/06/2020 1003    ALKPHOS 103 06/26/2020 0415    AST 18 06/26/2020 0415    ALT 12 06/26/2020 0415    BILITOT 0.7 06/26/2020 0415    ESTGFRAFRICA >60.0 10/06/2020 1003    EGFRNONAA 57.3 (A) 10/06/2020 1003          Lab Results   Component Value Date    ALT 12 06/26/2020    AST 18 06/26/2020    ALKPHOS 103 06/26/2020    BILITOT 0.7 06/26/2020      Lab Results   Component Value Date    HGBA1C 5.6 06/25/2020     Lab Results   Component Value Date     (H) 01/01/2020     (H) 01/24/2018     (H) 01/24/2017     Lab Results   Component Value Date    CHOL 206 (H) 06/25/2020    HDL 74 06/25/2020    LDLCALC 114.2 06/25/2020    TRIG 89 06/25/2020     Lab Results   Component Value Date    INR 1.0 10/06/2020    INR 1.3 (H) 08/05/2020    INR 2.4 (H) 07/30/2020        I have reviewed all pertinent labs within the past 24 hours.    Cardiovascular Imaging:   Echo: No results found for: EF  2D echo with color flow doppler:   Results for orders placed or performed in visit on 05/19/17   2D echo with color flow doppler   Result Value Ref Range    QEF 55 55 - 65    Mitral Valve Regurgitation TRIVIAL TO MILD     Est. PA Systolic Pressure 36.3      Tricuspid Valve Regurgitation MILD     Narrative    Date of Procedure: 05/19/2017        TEST DESCRIPTION       Aorta: The aortic root is normal in size, measuring 3.1 cm at sinotubular junction and 3.0 cm at Sinuses of Valsalva. The proximal ascending aorta is normal in size, measuring 3.0 cm across.     Left Atrium: The left atrial volume index is severely enlarged, measuring 50.62 cc/m2.     Left Ventricle: The left ventricle is normal in size, with an end-diastolic diameter of 5.0 cm, and an end-systolic diameter of 3.5 cm. LV wall thickness is normal, with the septum and the posterior wall each measuring 0.8 cm across. Relative wall   thickness was normal at 0.32, and the LV mass index was 81.4 g/m2 consistent with normal left ventricular mass. There are no regional wall motion abnormalities. Left ventricular systolic function appears normal. Visually estimated ejection fraction is   55-60%. The LV Doppler derived stroke volume equals 42.0 ccs.     Diastolic indices: Mitral inflow pattern reveals fusion of E & A waves. E wave velocity 1.4 m/s, E/A ratio 4.0,  msec., E/e' ratio(sep) 17. Diastolic function is indeterminate.     Right Atrium: The right atrium is upper limit of normal, measuring 6.1 cm in length and 4.2 cm in width in the apical view. The end-systolic right atrial area is 20.0 cm2.     Right Ventricle: The right ventricle is normal in size measuring 4.0 cm at the base in the apical right ventricle-focused view. Global right ventricular systolic function appears normal. Tricuspid annular plane systolic excursion (TAPSE) is 1.1 cm. The   estimated PA systolic pressure is 36 mmHg.     Aortic Valve:  The aortic valve is mildly sclerotic. The aortic valve is tri-leaflet in structure.     Mitral Valve:  The mitral valve is mildly sclerotic. There is trivial to mild mitral regurgitation. There is mitral annular calcification.     Tricuspid Valve:  There is mild tricuspid regurgitation. Tricuspid  valve is normal in structure with normal leaflet mobility.     Pulmonary Valve:  There is trivial pulmonic regurgitation. Pulmonary valve is normal in structure with normal leaflet mobility.     IVC: IVC is enlarged but collapses > 50% with a sniff, suggesting intermediate right atrial pressure of 8 mmHg.     Intracavitary: There is no evidence of pericardial effusion, intracavity mass, thrombi, or vegetation.         CONCLUSIONS     1 - Normal left ventricular systolic function (EF 55-60%).     2 - Normal right ventricular systolic function .     3 - Indeterminate LV diastolic function.     4 - The estimated PA systolic pressure is 36 mmHg.     5 - Trivial to mild mitral regurgitation.     6 - Mild tricuspid regurgitation.     7 - Trivial pulmonic regurgitation.     8 - Intermediate central venous pressure.             This document has been electronically    SIGNED BY: Elo Groves MD On: 05/19/2017 11:29    This document was originally electronically signed on: 05/19/2017 11:06    and Transthoracic echo (TTE) complete (Cupid Only): No results found. However, due to the size of the patient record, not all encounters were searched. Please check Results Review for a complete set of results.    Test(s) Reviewed  I have reviewed the following in detail:  [] Stress test   [] Angiography   [x] Echocardiogram   [x] Labs   [] Other:       Assessment & Plan:   The or sumed risk.  Patient had left atrial appendage thrombus on prior SONYA she was given without tears for medication assistance for her Eliquis.  Patient was consented inclinic appointment today.    -The risks, benefits & alternatives of the procedure were explained to the patient.    -The risks of Watchman include but are not limited to: Bleeding, infection, heart rhythm abnormalities, allergic reactions, kidney injury, stroke and death.    -The risks of moderate sedation include hypotension, respiratory depression, arrhythmias, bronchospasm, & death.     -Informed consent was obtained & the patient is agreeable to proceed with the procedure.    Usman HOOKER Knight - Pager# [phone]  10/6/2020  11:54 AM  Cardiovascular Fellow  [facility]   Interventional Cardiology Staff  I have personally taken the history and examined this patient. I have discussed and agree with the resident's findings and plan as documented in the resident's note.          Chronic atrial fibrillation  Severe chronic atrial fibrillation- Chronic AFib since 2016, on Coumadin  - Labile INR's recently, balance issues  - CHADS-VASc = 7, HASBLED = 5  - Cardiac CT completed for appendage dimensions  - Plan for LAAO (Watchman), will obtain SONYA at the time of the procedure  - Anti-platelet Therapy: ASA 81 mg Daily, on warfarin  - Access: R CFV  - Creatinine/CrCl: 0.8 on 6/26/2020  - Allergies: No shellfish/Iodine allergy  - Pre-Hydration: 3 cc/kg/hr IV, continuous, for 1 hour, Pre-Procedure  - Pre-Op Med: Diphenhydramine (Benadryl) 50 mg, Oral, Once, Pre-Procedure   - All patient's questions were answered      Hypertension  - Well controlled in clinic today  - Continue furosemide 20 mg Daily    Chronic kidney disease, stage III (moderate)  Will not perform CTA for LAAO sizing.                                                   \Abundio Curtis

## 2020-10-06 NOTE — PROGRESS NOTES
You have been scheduled for your procedure on Wednesday, November 4, 2020.      Please report to the Cardiology Waiting Area on the Third floor of the hospital and check in at 6 AM.     You will then be taken to the SSCU (Short Stay Cardiac Unit) and prepared for your procedure. Please be aware that this is not the time of your procedure but the time that you are to arrive.     Preperations for your procedure  1. Shower with Dial soap the night before and the morning of your procedure.  2. Call the office for any signs of infection ( fever, cough, pneumonia, urinary tract, etc.) We cannot implant a device in an infected patient.      You may not have anything to eat or drink after midnight the night before your test. You may take your regular morning medications with as much water as necessary. If there are any medications that you should not take you will be instructed to hold them that morning. If you are diabetic and on Metformin (Glucophage) do not take it the day before, the day of, and for 2 days after your procedure.   Start Coumadin 5mg daily two days prior to your procedure if you are not currently taking it.     The entire procedure may take up to three hours. After the procedure, you will return to your room on the third floor where you will be monitored closely for the next several hours.     Your length of stay in the hospital will be determined by your physician. You may be discharged the same day if your physician is satisfied with your progress.     Follow up After Your Procedure  About 45 days after your procedure you will be required to have a Transesophageal echo and a clinic visit with your physician at [facility] in [place].   At 6 months, 1 year, and 2 years after your procedure, you will receive a phone call follow up with one of our nurses.  You can follow up with your regular Cardiologist regarding any other heart issues.     If you should have any questions, concerns, or need to  "change the date of your procedure, please call "Zoraida RN @ [phone]    Special Instructions:    Do not stop Eliquis.      THE ABOVE INSTRUCTIONS WERE GIVEN TO THE PATIENT VERBALLY AND THEY VERBALIZED UNDERSTANDING.  THEY DO NOT REQUIRE ANY SPECIAL NEEDS AND DO NOT HAVE ANY LEARNING BARRIERS.          Directions for Reporting to Cardiology Waiting Area in the Hospital  If you park in the Parking Garage:  Take elevators to the1st floor of the parking garage.  Continue past the gift shop, coffee shop, and piano.  Take a right and go to the gold elevators. (Elevator B)  Take the elevator to the 3rd floor.  Follow the arrow on the sign on the wall that says Cath Lab Registration/EP Lab Registration.  Follow the long hallway all the way around until you come to a big open area.  This is the registration area.  Check in at Reception Desk.    OR    If family is dropping you off:  Have them drop you off at the front of the Hospital under the green overhang.  Enter through the doors and take a right.  Take the E elevators to the 3rd floor Cardiology Waiting Area.  Check in at the Reception Desk in the waiting room.              "

## 2020-10-07 ENCOUNTER — ANESTHESIA (OUTPATIENT)
Dept: MEDSURG UNIT | Facility: HOSPITAL | Age: 85
DRG: 274 | End: 2020-10-07
Payer: MEDICARE

## 2020-10-08 ENCOUNTER — TELEPHONE (OUTPATIENT)
Dept: CARDIOLOGY | Facility: CLINIC | Age: 85
End: 2020-10-08

## 2020-10-08 NOTE — TELEPHONE ENCOUNTER
Patient scheduled for Watchman device on 11/4/2020. Patient called today asking if she could get the flu shot. I told her yes she can get the flu shot. Patient also had concerns about the procedure and discharge. She lives alone and has balance issues. Patient would like to spend the night in the hospital post procedure because she has no one to help her at home. I told the patient that we can keep her overnight if we need to. Patient verbalized understanding.

## 2020-10-14 ENCOUNTER — HOSPITAL ENCOUNTER (OUTPATIENT)
Dept: RADIOLOGY | Facility: HOSPITAL | Age: 85
Discharge: HOME | End: 2020-10-14
Attending: INTERNAL MEDICINE
Payer: MEDICARE

## 2020-10-14 ENCOUNTER — CLINICAL SUPPORT (OUTPATIENT)
Dept: INTERNAL MEDICINE | Facility: CLINIC | Age: 85
End: 2020-10-14
Payer: MEDICARE

## 2020-10-14 DIAGNOSIS — Z12.31 VISIT FOR SCREENING MAMMOGRAM: ICD-10-CM

## 2020-10-14 DIAGNOSIS — J30.9 CHRONIC ALLERGIC RHINITIS: ICD-10-CM

## 2020-10-14 PROCEDURE — 77063 MAMMO DIGITAL SCREENING BILAT WITH TOMO: ICD-10-PCS | Mod: 26,HCNC,, | Performed by: RADIOLOGY

## 2020-10-14 PROCEDURE — 77067 MAMMO DIGITAL SCREENING BILAT WITH TOMO: ICD-10-PCS | Mod: 26,HCNC,, | Performed by: RADIOLOGY

## 2020-10-14 PROCEDURE — 99499 NO LOS: ICD-10-PCS | Mod: HCNC,S$GLB,, | Performed by: ALLERGY & IMMUNOLOGY

## 2020-10-14 PROCEDURE — 95115 PR IMMUNOTHERAPY, ONE INJECTION: ICD-10-PCS | Mod: HCNC,S$GLB,, | Performed by: FAMILY MEDICINE

## 2020-10-14 PROCEDURE — 77067 SCR MAMMO BI INCL CAD: CPT | Mod: TC,HCNC,PO

## 2020-10-14 PROCEDURE — 77063 BREAST TOMOSYNTHESIS BI: CPT | Mod: 26,HCNC,, | Performed by: RADIOLOGY

## 2020-10-14 PROCEDURE — 95115 IMMUNOTHERAPY ONE INJECTION: CPT | Mod: HCNC,S$GLB,, | Performed by: FAMILY MEDICINE

## 2020-10-14 PROCEDURE — 99499 UNLISTED E&M SERVICE: CPT | Mod: HCNC,S$GLB,, | Performed by: ALLERGY & IMMUNOLOGY

## 2020-10-14 PROCEDURE — 77067 SCR MAMMO BI INCL CAD: CPT | Mod: 26,HCNC,, | Performed by: RADIOLOGY

## 2020-10-20 ENCOUNTER — PROCEDURE VISIT (OUTPATIENT)
Dept: OPHTHALMOLOGY | Facility: CLINIC | Age: 85
End: 2020-10-20
Payer: MEDICARE

## 2020-10-20 DIAGNOSIS — H35.3221 EXUDATIVE AGE-RELATED MACULAR DEGENERATION OF LEFT EYE WITH ACTIVE CHOROIDAL NEOVASCULARIZATION: Primary | ICD-10-CM

## 2020-10-20 DIAGNOSIS — H35.033 HYPERTENSIVE RETINOPATHY OF BOTH EYES: ICD-10-CM

## 2020-10-20 DIAGNOSIS — H43.813 POSTERIOR VITREOUS DETACHMENT, BILATERAL: ICD-10-CM

## 2020-10-20 PROBLEM — N39.0 RECURRENT UTI: Status: RESOLVED | Noted: 2019-11-04 | Resolved: 2020-10-20

## 2020-10-20 PROCEDURE — 67028 PR INJECT INTRAVITREAL PHARMCOLOGIC: ICD-10-PCS | Mod: HCNC,LT,S$GLB, | Performed by: OPHTHALMOLOGY

## 2020-10-20 PROCEDURE — 92014 COMPRE OPH EXAM EST PT 1/>: CPT | Mod: 25,HCNC,S$GLB, | Performed by: OPHTHALMOLOGY

## 2020-10-20 PROCEDURE — 92134 POSTERIOR SEGMENT OCT RETINA (OCULAR COHERENCE TOMOGRAPHY)-BOTH EYES: ICD-10-PCS | Mod: HCNC,S$GLB,, | Performed by: OPHTHALMOLOGY

## 2020-10-20 PROCEDURE — 67028 INJECTION EYE DRUG: CPT | Mod: HCNC,LT,S$GLB, | Performed by: OPHTHALMOLOGY

## 2020-10-20 PROCEDURE — 92014 PR EYE EXAM, EST PATIENT,COMPREHESV: ICD-10-PCS | Mod: 25,HCNC,S$GLB, | Performed by: OPHTHALMOLOGY

## 2020-10-20 PROCEDURE — 92134 CPTRZ OPH DX IMG PST SGM RTA: CPT | Mod: HCNC,S$GLB,, | Performed by: OPHTHALMOLOGY

## 2020-10-20 RX ADMIN — Medication 1.25 MG: at 03:10

## 2020-10-20 NOTE — PROGRESS NOTES
HPI     Eye Problem      Additional comments: BRVO OS              Comments     DLS: 09/115/2020 Dr. Hernandez    Patient states her vision in her left eye is much better. She states she   does not see as many floaters.         OCT - OD - no ME  OS - SRF resolved      A/P    1. Wet AMD OS  PPCNVM  S/p Avastin OS x 4    Extend    Avastin OS today    2. Prior CRVO OS  Stable    3. PCIOL OU    4. S/p strab sx    5. HTN Ret OU  BP control      8 weeks  OCT no dilate      Risks, benefits, and alternatives to treatment discussed in detail with the patient.  The patient voiced understanding and wished to proceed with the procedure    Injection Procedure Note:  Diagnosis: Wet AMD OS    Patient Identified and Time Out complete  Pt Prefers to be marked with sticker rather than ink marker ([id number] #5)  Topical Proparacaine and Betadine.  Inject Avastin OS at 6:00 @ 3.5-4mm posterior to limbus  Post Operative Dx: Same  Complications: None  Follow up as above.

## 2020-10-20 NOTE — PROGRESS NOTES
10/20/20-Attempted to contact patient to complete follow-up call for [facility]. Unable to reach patient. Letter has been sent to patient with [facility]PCM contact information should any needs arise in the future. Closing case at this time.

## 2020-10-21 ENCOUNTER — OFFICE VISIT (OUTPATIENT)
Dept: INTERNAL MEDICINE | Facility: CLINIC | Age: 85
End: 2020-10-21
Payer: MEDICARE

## 2020-10-21 ENCOUNTER — OFFICE VISIT (OUTPATIENT)
Dept: DERMATOLOGY | Facility: CLINIC | Age: 85
End: 2020-10-21
Payer: MEDICARE

## 2020-10-21 VITALS
SYSTOLIC BLOOD PRESSURE: 118 MMHG | DIASTOLIC BLOOD PRESSURE: 70 MMHG | BODY MASS INDEX: 25.88 KG/M2 | RESPIRATION RATE: 14 BRPM | OXYGEN SATURATION: 99 % | HEART RATE: 66 BPM | WEIGHT: 161 LBS | HEIGHT: 66 IN

## 2020-10-21 VITALS — BODY MASS INDEX: 26.15 KG/M2 | WEIGHT: 162 LBS

## 2020-10-21 DIAGNOSIS — N32.81 OAB (OVERACTIVE BLADDER): ICD-10-CM

## 2020-10-21 DIAGNOSIS — M47.817 FACET ARTHROPATHY, LUMBOSACRAL: ICD-10-CM

## 2020-10-21 DIAGNOSIS — Z79.01 CURRENT USE OF LONG TERM ANTICOAGULATION: ICD-10-CM

## 2020-10-21 DIAGNOSIS — Z86.73 HISTORY OF TIA (TRANSIENT ISCHEMIC ATTACK): ICD-10-CM

## 2020-10-21 DIAGNOSIS — I87.2 VENOUS INSUFFICIENCY OF BOTH LOWER EXTREMITIES: ICD-10-CM

## 2020-10-21 DIAGNOSIS — I10 HYPERTENSION, UNSPECIFIED TYPE: Chronic | ICD-10-CM

## 2020-10-21 DIAGNOSIS — N18.2 STAGE 2 CHRONIC KIDNEY DISEASE: ICD-10-CM

## 2020-10-21 DIAGNOSIS — H52.7 REFRACTIVE ERROR: ICD-10-CM

## 2020-10-21 DIAGNOSIS — I48.0 PAROXYSMAL ATRIAL FIBRILLATION: ICD-10-CM

## 2020-10-21 DIAGNOSIS — Z98.890 HISTORY OF STRABISMUS SURGERY: ICD-10-CM

## 2020-10-21 DIAGNOSIS — M19.90 ARTHRITIS: ICD-10-CM

## 2020-10-21 DIAGNOSIS — R26.81 GAIT INSTABILITY: ICD-10-CM

## 2020-10-21 DIAGNOSIS — I50.32 CHRONIC DIASTOLIC HEART FAILURE: ICD-10-CM

## 2020-10-21 DIAGNOSIS — E78.00 PURE HYPERCHOLESTEROLEMIA: ICD-10-CM

## 2020-10-21 DIAGNOSIS — M81.6 LOCALIZED OSTEOPOROSIS, UNSPECIFIED PATHOLOGICAL FRACTURE PRESENCE: ICD-10-CM

## 2020-10-21 DIAGNOSIS — H35.3221 EXUDATIVE AGE-RELATED MACULAR DEGENERATION OF LEFT EYE WITH ACTIVE CHOROIDAL NEOVASCULARIZATION: ICD-10-CM

## 2020-10-21 DIAGNOSIS — H90.3 SENSORINEURAL HEARING LOSS (SNHL) OF BOTH EARS: ICD-10-CM

## 2020-10-21 DIAGNOSIS — H35.033 HYPERTENSIVE RETINOPATHY OF BOTH EYES: ICD-10-CM

## 2020-10-21 DIAGNOSIS — H81.09 MENIERE'S DISEASE, UNSPECIFIED LATERALITY: ICD-10-CM

## 2020-10-21 DIAGNOSIS — L82.1 SEBORRHEIC KERATOSES: Primary | ICD-10-CM

## 2020-10-21 DIAGNOSIS — Z87.2 HISTORY OF DERMATITIS: ICD-10-CM

## 2020-10-21 DIAGNOSIS — Z00.00 ENCOUNTER FOR PREVENTIVE HEALTH EXAMINATION: Primary | ICD-10-CM

## 2020-10-21 DIAGNOSIS — H34.8122 STABLE CENTRAL RETINAL VEIN OCCLUSION OF LEFT EYE: ICD-10-CM

## 2020-10-21 DIAGNOSIS — H34.8392 BRANCH RETINAL VEIN OCCLUSION, UNSPECIFIED COMPLICATION STATUS, UNSPECIFIED LATERALITY: ICD-10-CM

## 2020-10-21 DIAGNOSIS — M46.97 UNSPECIFIED INFLAMMATORY SPONDYLOPATHY, LUMBOSACRAL REGION: ICD-10-CM

## 2020-10-21 DIAGNOSIS — I70.0 ATHEROSCLEROSIS OF AORTA: ICD-10-CM

## 2020-10-21 DIAGNOSIS — M19.011 PRIMARY OSTEOARTHRITIS OF RIGHT SHOULDER: ICD-10-CM

## 2020-10-21 DIAGNOSIS — Z96.1 PSEUDOPHAKIA, BOTH EYES: ICD-10-CM

## 2020-10-21 DIAGNOSIS — H43.813 POSTERIOR VITREOUS DETACHMENT, BILATERAL: ICD-10-CM

## 2020-10-21 DIAGNOSIS — J31.0 CHRONIC RHINITIS: ICD-10-CM

## 2020-10-21 DIAGNOSIS — I51.89 LEFT VENTRICULAR DIASTOLIC DYSFUNCTION WITH PRESERVED SYSTOLIC FUNCTION: ICD-10-CM

## 2020-10-21 DIAGNOSIS — M48.061 SPINAL STENOSIS OF LUMBAR REGION, UNSPECIFIED WHETHER NEUROGENIC CLAUDICATION PRESENT: ICD-10-CM

## 2020-10-21 DIAGNOSIS — H52.01 HYPERMETROPIA OF RIGHT EYE: ICD-10-CM

## 2020-10-21 DIAGNOSIS — R73.03 PREDIABETES: ICD-10-CM

## 2020-10-21 DIAGNOSIS — H04.129 DRY EYE SYNDROME, UNSPECIFIED LATERALITY: ICD-10-CM

## 2020-10-21 DIAGNOSIS — Z91.81 RISK FOR FALLS: ICD-10-CM

## 2020-10-21 DIAGNOSIS — I48.20 CHRONIC ATRIAL FIBRILLATION: ICD-10-CM

## 2020-10-21 DIAGNOSIS — H50.10 EXOTROPIA OF BOTH EYES: ICD-10-CM

## 2020-10-21 DIAGNOSIS — N18.30 STAGE 3 CHRONIC KIDNEY DISEASE, UNSPECIFIED WHETHER STAGE 3A OR 3B CKD: ICD-10-CM

## 2020-10-21 PROBLEM — R22.2 MASS ON BACK: Status: RESOLVED | Noted: 2018-08-16 | Resolved: 2020-10-21

## 2020-10-21 PROCEDURE — 1159F PR MEDICATION LIST DOCUMENTED IN MEDICAL RECORD: ICD-10-PCS | Mod: HCNC,S$GLB,, | Performed by: DERMATOLOGY

## 2020-10-21 PROCEDURE — 1126F PR PAIN SEVERITY QUANTIFIED, NO PAIN PRESENT: ICD-10-PCS | Mod: HCNC,S$GLB,, | Performed by: DERMATOLOGY

## 2020-10-21 PROCEDURE — G0439 PR MEDICARE ANNUAL WELLNESS SUBSEQUENT VISIT: ICD-10-PCS | Mod: HCNC,S$GLB,, | Performed by: NURSE PRACTITIONER

## 2020-10-21 PROCEDURE — 99999 PR PBB SHADOW E&M-EST. PATIENT-LVL III: ICD-10-PCS | Mod: PBBFAC,HCNC,, | Performed by: DERMATOLOGY

## 2020-10-21 PROCEDURE — 99999 PR PBB SHADOW E&M-EST. PATIENT-LVL V: ICD-10-PCS | Mod: PBBFAC,HCNC,, | Performed by: NURSE PRACTITIONER

## 2020-10-21 PROCEDURE — G0439 PPPS, SUBSEQ VISIT: HCPCS | Mod: HCNC,S$GLB,, | Performed by: NURSE PRACTITIONER

## 2020-10-21 PROCEDURE — 99499 UNLISTED E&M SERVICE: CPT | Mod: S$GLB,,, | Performed by: NURSE PRACTITIONER

## 2020-10-21 PROCEDURE — 1126F AMNT PAIN NOTED NONE PRSNT: CPT | Mod: HCNC,S$GLB,, | Performed by: DERMATOLOGY

## 2020-10-21 PROCEDURE — 99213 OFFICE O/P EST LOW 20 MIN: CPT | Mod: HCNC,S$GLB,, | Performed by: DERMATOLOGY

## 2020-10-21 PROCEDURE — 1101F PR PT FALLS ASSESS DOC 0-1 FALLS W/OUT INJ PAST YR: ICD-10-PCS | Mod: HCNC,CPTII,S$GLB, | Performed by: DERMATOLOGY

## 2020-10-21 PROCEDURE — 1101F PT FALLS ASSESS-DOCD LE1/YR: CPT | Mod: HCNC,CPTII,S$GLB, | Performed by: DERMATOLOGY

## 2020-10-21 PROCEDURE — 1159F MED LIST DOCD IN RCRD: CPT | Mod: HCNC,S$GLB,, | Performed by: DERMATOLOGY

## 2020-10-21 PROCEDURE — 99999 PR PBB SHADOW E&M-EST. PATIENT-LVL V: CPT | Mod: PBBFAC,HCNC,, | Performed by: NURSE PRACTITIONER

## 2020-10-21 PROCEDURE — 99213 PR OFFICE/OUTPT VISIT, EST, LEVL III, 20-29 MIN: ICD-10-PCS | Mod: HCNC,S$GLB,, | Performed by: DERMATOLOGY

## 2020-10-21 PROCEDURE — 99499 RISK ADDL DX/OHS AUDIT: ICD-10-PCS | Mod: S$GLB,,, | Performed by: NURSE PRACTITIONER

## 2020-10-21 PROCEDURE — 99999 PR PBB SHADOW E&M-EST. PATIENT-LVL III: CPT | Mod: PBBFAC,HCNC,, | Performed by: DERMATOLOGY

## 2020-10-21 RX ORDER — VITAMIN E 268 MG
400 CAPSULE ORAL DAILY
COMMUNITY

## 2020-10-21 RX ORDER — ASPIRIN 81 MG/1
81 TABLET ORAL DAILY
COMMUNITY
End: 2020-12-17 | Stop reason: SDUPTHER

## 2020-10-21 RX ORDER — DOCUSATE SODIUM 100 MG/1
100 CAPSULE, LIQUID FILLED ORAL DAILY
COMMUNITY
End: 2021-07-09

## 2020-10-21 NOTE — PATIENT INSTRUCTIONS

## 2020-10-21 NOTE — PATIENT INSTRUCTIONS
Counseling and Referral of Other Preventative  (Italic type indicates deductible and co-insurance are waived)    Patient Name: Jenniffer Jimenez  Today's Date: 10/21/2020    Health Maintenance       Date Due Completion Date    Lipid Panel 06/25/2021 6/25/2020    TETANUS VACCINE 03/11/2028 3/11/2018        Mammogram                                                    10/14/2021                10/14/2020    Bone density                                           Check insurance coverage      7/20/2018                                                                If paid every 2 or 3 years.                                                                If it pays every 2 years, it is due    Annual eye exam- current            Orders Placed This Encounter   Procedures    DXA Bone Density Spine And Hip     The following information is provided to all patients.  This information is to help you find resources for any of the problems found today that may be affecting your health:                Living healthy guide: www.[facility].[place].gov      Understanding Diabetes: www.diabetes.org      Eating healthy: www.cdc.gov/healthyweight      [facility] home safety checklist: www.cdc.gov/steadi/patient.html      Agency on Aging: www.goea.[place].gov      Alcoholics anonymous (AA): www.aa.org      Physical Activity: www.nichelle.nih.gov/mx4gwus      Tobacco use: www.quitwithusla.org

## 2020-10-21 NOTE — PROGRESS NOTES
Subjective:       Patient ID:  Jenniffer Jimenez is a 88 y.o. female who presents for   Chief Complaint   Patient presents with    Spot     left lower leg, chest, itching     Skin Check     UBSE     The stasis dermatitis on her left leg has resolved, she had laser for telangiectasias on face which worked well.       Review of Systems   Constitutional: Negative for fever, chills, weight loss, weight gain, fatigue, night sweats and malaise.   Skin: Positive for daily sunscreen use, activity-related sunscreen use and wears hat.   Hematologic/Lymphatic: Bruises/bleeds easily.        Objective:    Physical Exam   Constitutional: She appears well-developed and well-nourished. No distress.   Neurological: She is alert and oriented to person, place, and time. She is not disoriented.   Psychiatric: She has a normal mood and affect.   Skin:   Areas Examined (abnormalities noted in diagram):   Head / Face Inspection Performed  Neck Inspection Performed  Chest / Axilla Inspection Performed  Back Inspection Performed  RUE Inspected  LUE Inspection Performed  Nails and Digits Inspection Performed                   Diagram Legend     Erythematous scaling macule/papule c/w actinic keratosis       Vascular papule c/w angioma      Pigmented verrucoid papule/plaque c/w seborrheic keratosis      Yellow umbilicated papule c/w sebaceous hyperplasia      Irregularly shaped tan macule c/w lentigo     1-2 mm smooth white papules consistent with Milia      Movable subcutaneous cyst with punctum c/w epidermal inclusion cyst      Subcutaneous movable cyst c/w pilar cyst      Firm pink to brown papule c/w dermatofibroma      Pedunculated fleshy papule(s) c/w skin tag(s)      Evenly pigmented macule c/w junctional nevus     Mildly variegated pigmented, slightly irregular-bordered macule c/w mildly atypical nevus      Flesh colored to evenly pigmented papule c/w intradermal nevus       Pink pearly papule/plaque c/w basal cell carcinoma       Erythematous hyperkeratotic cursted plaque c/w SCC      Surgical scar with no sign of skin cancer recurrence      Open and closed comedones      Inflammatory papules and pustules      Verrucoid papule consistent consistent with wart     Erythematous eczematous patches and plaques     Dystrophic onycholytic nail with subungual debris c/w onychomycosis     Umbilicated papule    Erythematous-base heme-crusted tan verrucoid plaque consistent with inflamed seborrheic keratosis     Erythematous Silvery Scaling Plaque c/w Psoriasis     See annotation      Assessment / Plan:        Seborrheic keratoses  reassurance      History of dermatitis  Left leg  See previous note           Follow up if symptoms worsen or fail to improve.

## 2020-10-21 NOTE — PROGRESS NOTES
"Jenniffer Jimenez presented for a  Medicare AWV and comprehensive Health Risk Assessment today. The following components were reviewed and updated:    · Medical history  · Family History  · Social history  · Allergies and Current Medications  · Health Risk Assessment  · Health Maintenance  · Care Team     ** See Completed Assessments for Annual Wellness Visit within the encounter summary.**       The following assessments were completed:  · Living Situation  · CAGE  · Depression Screening  · Timed Get Up and Go  · Whisper Test  · Cognitive Function Screening  · Nutrition Screening  · ADL Screening  · PAQ Screening    Vitals:    10/21/20 0958   BP: 118/70   Pulse: 66   Resp: 14   SpO2: 99%   Weight: 73 kg (161 lb)   Height: 5' 5.5" (1.664 m)     Body mass index is 26.38 kg/m².  Physical Exam  Constitutional:       Appearance: She is well-developed.   HENT:      Head: Normocephalic.      Comments: Wears mask     Right Ear: External ear normal.      Left Ear: External ear normal.      Ears:      Comments: Hear aide R     Mouth/Throat:      Pharynx: No oropharyngeal exudate or posterior oropharyngeal erythema.   Eyes:      General: No scleral icterus.  Neck:      Musculoskeletal: Normal range of motion and neck supple.   Cardiovascular:      Rate and Rhythm: Normal rate and regular rhythm.   Pulmonary:      Effort: Pulmonary effort is normal. No respiratory distress.      Breath sounds: Normal breath sounds.   Abdominal:      General: Bowel sounds are normal. There is no distension.      Palpations: Abdomen is soft.   Musculoskeletal: Normal range of motion.      Comments: Walks w rollator   Skin:     General: Skin is warm and dry.      Findings: No rash.   Neurological:      General: No focal deficit present.      Mental Status: She is alert and oriented to person, place, and time.      Cranial Nerves: No cranial nerve deficit.      Motor: No abnormal muscle tone.      Coordination: Coordination normal.      Deep Tendon " Reflexes: Reflexes are normal and symmetric. Reflexes normal.   Psychiatric:         Mood and Affect: Mood normal.         Behavior: Behavior normal.         Thought Content: Thought content normal.         Judgment: Judgment normal.           Lab Results   Component Value Date    HGBA1C 5.6 06/25/2020    CHOL 206 (H) 06/25/2020    HDL 74 06/25/2020    LDLCALC 114.2 06/25/2020    TRIG 89 06/25/2020    CHOLHDL 35.9 06/25/2020      Results for orders placed in visit on 07/20/18   DXA Bone Density Spine And Hip    Narrative EXAMINATION:  DEXA BONE DENSITY SPINE HIP    CLINICAL HISTORY:  Asymptomatic menopausal state86 y/o female with a history of fractured left shoulder and humerus at 59 y/o.  She had a hysterectomy at 34 y/o.  Pt's Mother had a hip fracture.  She does not exercise and does not smoke.    TECHNIQUE:  DXA specification: [place] [employer] [id number].    Bone Mineral Density scanning was performed over the hip and lumbar spine. Review of the images confirms satisfactory positioning and technique.    COMPARISON:  Comparison study done on 06/27/2016. Lumbar spine BMD 1.252 g/cm2 and the T-score 1.9.  The Total Hip BMD 0.713 g/cm2 and the T-score -1.9.    FINDINGS:  Lumbar Spine: Lumbar bone mineral density L1-L4 is 1.215g/cm2, which is a T-score of 1.5. The Z-score is 4.4.    Total Hip: Total hip bone mineral density is 0.659g/cm2.  The T-score is -2.3, and the Z-score is 0.0.    Femoral neck: Bone mineral density is 0.574g/cm2 and the T-score is -2.5 and the Z-score is 0.0 g/cm2.    There is a 33% risk of a major osteoporotic fracture and a 23% risk of hip fracture in the next 10 years (FRAX).    Compared with previous DXA, BMD at the lumbar spine has decreased 3.0%  and the BMD at the total hip has decreased 7.7%.      Impression Osteoporosis.    RECOMMENDATIONS of [facility]:    1.  Calcium 1052-0845 mg daily and vitamin D 800-1000 units daily, adequate  exercise.    2.  If clinically appropriate, consider bisphosphonates (eg. alendronate, risedronate, zolendronic acid), denosumab, or teraparatide/abaloparatide.    3.  Repeat BMD in 2 years    4.  Given BMD irregularities in the lumbar spine, can consider lumbar and thoracic spinal xrays/imaging, if not previously done, to assess for vertebral abnormalities.    EXPLANATION OF RESULTS:    The T-score compares these results to the average bone density of a 20 year-old of the same gender. The Z-score compares this result to the average bone density to people of the same age, gender, and race. The amounts indicate the number of standard deviations above or below the mean.    * Osteoporosis is generally defined as having a T-score between less than or equal to -2.5.    * Osteopenia is generally defined as having a T-score between -1.0 and -2.5.    * The normal range is generally defined as having a t-score greater than or equal to -1.0.    * Calculated FRAX scores for fracture risk prediction may not be accurate in the setting of certain clinical factors such as pharmacologic therapy for osteoporosis, prior fragility fractures, high dose glucocorticoid use etc.      Electronically signed by: Anthony Up MD  Date:    07/24/2018  Time:    09:12     Results for orders placed during the hospital encounter of 06/27/16   Mammo Digital Screening Bilat with CAD    Narrative The present examination has been compared to prior imaging studies dated  06/25/2015 and 02/28/2014.    BILATERAL MAMMOGRAM FINDINGS:  There are scattered fibroglandular densities.    No significant abnormalities are seen.    These images  were processed to produce digital images analyzed for potential  abnormalities.      Impression There is no mammographic evidence of malignancy.    Mammogram in 1 year is recommended.    ACR BI-RADS Category 1: Negative      JAZMIN WOLF M.D.  06/28/2016     Diagnoses and health risks identified today and associated  recommendations/orders:    1. Pure hypercholesterolemia  Stable- followed by PCP    2. Pseudophakia, both eyes  Stable- followed by ophth    3. Stable central retinal vein occlusion of left eye  Stable- followed by ophth    4. Chronic rhinitis  Stable- followed by PCP    5. Sensorineural hearing loss (SNHL) of both ears  Stable- followed by PCP, audible    6. Arthritis  Stable- followed by PCP    7. Exotropia of both eyes  Stable- followed by ophth    8. Gait instability  Stable- followed by PCP    9. Meniere's disease, unspecified laterality  Stable- followed by PCP    10. Facet arthropathy, lumbosacral  Stable- followed by PCP    11. Spinal stenosis of lumbar region, unspecified whether neurogenic claudication present  Stable- followed by PCP    12. Chronic diastolic heart failure  Stable- followed by cardiology    13. Hypermetropia of right eye  Stable- followed by ophth    14. Chronic atrial fibrillation  Stable- followed by cardiology    15. Stage 3 chronic kidney disease, unspecified whether stage 3a or 3b CKD  Stable- followed by PCP    16. Hypertension, unspecified type  Stable- followed by cardiology    17. Encounter for preventive health examination  Here for Health Risk Assessment/Annual Wellness Visit.  Health maintenance reviewed and updated. Follow up in one year.       18. 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 eye with active choroidal neovascularization  Stable- followed by ophth    19. Paroxysmal atrial fibrillation  Stable- followed by cardiology    20. Branch retinal vein occlusion, unspecified complication status, unspecified laterality  Stable- followed by ophth    21. Venous insufficiency of both lower extremities  Stable- followed by cardiology    22. OAB (overactive bladder)  Stable- followed by PCP    23. Risk for falls  Stable- followed by PCP    24. Dry eye syndrome, unspecified laterality  Stable- followed by ophth    25. Posterior vitreous detachment, bilateral  Stable-  followed by ophth    26. Refractive error  Stable- followed by ophth    27. Prediabetes  Stable- followed by PCP    28. Current use of long term anticoagulation  Stable- followed by cardiology    29. History of strabismus surgery  Stable- followed by ophth    30. Primary osteoarthritis of right shoulder  Stable- followed by PCP    31. Localized osteoporosis, unspecified pathological fracture presence  Stable- followed by PCP  - DXA Bone Density Spine And Hip; Future    32. History of TIA (transient ischemic attack)  Stable- followed by PCP    33. Left ventricular diastolic dysfunction with preserved systolic function  Stable- followed by cardiology    34. Atherosclerosis of aorta  Stable- followed by cardiology    35. Hypertensive retinopathy of both eyes  Stable- followed by ophth    36. Unspecified inflammatory spondylopathy, lumbosacral region  Stable- followed by PCP        Provided Jenniffer with a 5-10 year written screening schedule and personal prevention plan. Recommendations were developed using the USPSTF age appropriate recommendations. Education, counseling, and referrals were provided as needed. After Visit Summary printed and given to patient which includes a list of additional screenings\tests needed. Denies bleeding , falls. Refer to after visit summary  for individualized health maintainence chart other identifying  risk factor mitigation interventions & specific health maintenance recommendations . She will ivonne annual w PCP-due.    Follow up in about 1 year (around 10/21/2021) for HRA.    Kaila Aguilar NP I offered to discuss end of life issues, including information on how to make advance directives that the patient could use to name someone who would make medical decisions on their behalf if they became too ill to make themselves.    ___Patient declined  _X_Patient is interested, I provided paper work and offered to discuss.

## 2020-10-26 ENCOUNTER — TELEPHONE (OUTPATIENT)
Dept: GASTROENTEROLOGY | Facility: CLINIC | Age: 85
End: 2020-10-26

## 2020-10-26 NOTE — TELEPHONE ENCOUNTER
MA contacted pt to update her about appt being rescheduled on 11/6 to 11/2 for 8:40 due to provider not being in clinic . Pt did not answer, MA will try again later.

## 2020-10-29 ENCOUNTER — PATIENT MESSAGE (OUTPATIENT)
Dept: GASTROENTEROLOGY | Facility: CLINIC | Age: 85
End: 2020-10-29

## 2020-10-29 ENCOUNTER — OFFICE VISIT (OUTPATIENT)
Dept: PODIATRY | Facility: CLINIC | Age: 85
End: 2020-10-29
Payer: MEDICARE

## 2020-10-29 VITALS
DIASTOLIC BLOOD PRESSURE: 61 MMHG | HEART RATE: 71 BPM | SYSTOLIC BLOOD PRESSURE: 125 MMHG | WEIGHT: 160.94 LBS | BODY MASS INDEX: 26.37 KG/M2

## 2020-10-29 DIAGNOSIS — N18.30 STAGE 3 CHRONIC KIDNEY DISEASE, UNSPECIFIED WHETHER STAGE 3A OR 3B CKD: ICD-10-CM

## 2020-10-29 DIAGNOSIS — G60.9 IDIOPATHIC PERIPHERAL NEUROPATHY: Primary | ICD-10-CM

## 2020-10-29 DIAGNOSIS — B35.1 ONYCHOMYCOSIS DUE TO DERMATOPHYTE: ICD-10-CM

## 2020-10-29 PROCEDURE — 11721 PR DEBRIDEMENT OF NAILS, 6 OR MORE: ICD-10-PCS | Mod: Q9,HCNC,S$GLB, | Performed by: PODIATRIST

## 2020-10-29 PROCEDURE — 99499 UNLISTED E&M SERVICE: CPT | Mod: HCNC,S$GLB,, | Performed by: PODIATRIST

## 2020-10-29 PROCEDURE — 99999 PR PBB SHADOW E&M-EST. PATIENT-LVL III: CPT | Mod: PBBFAC,HCNC,, | Performed by: PODIATRIST

## 2020-10-29 PROCEDURE — 99499 NO LOS: ICD-10-PCS | Mod: HCNC,S$GLB,, | Performed by: PODIATRIST

## 2020-10-29 PROCEDURE — 99999 PR PBB SHADOW E&M-EST. PATIENT-LVL III: ICD-10-PCS | Mod: PBBFAC,HCNC,, | Performed by: PODIATRIST

## 2020-10-29 PROCEDURE — 11721 DEBRIDE NAIL 6 OR MORE: CPT | Mod: Q9,HCNC,S$GLB, | Performed by: PODIATRIST

## 2020-10-29 NOTE — PROGRESS NOTES
Subjective:      Patient ID: Jenniffer Jimenez is a 88 y.o. female.    Chief Complaint: Foot Problem (fungus)    Jenniffer is a 88 y.o. female who presents to the clinic for evaluation and treatment of high risk feet. Jenniffer has a past medical history of Amblyopia of left eye (4/10/2013), Anticoagulant long-term use, Anxiety, Arthritis, Atherosclerosis of aorta, Atrial fibrillation, Auricular fibrillation, Central retinal vein occlusion of left eye, CHF (congestive heart failure), Chronic kidney disease, stage 3, Coronary artery disease, Depression, Diastolic heart failure (8/20/2014), Exotropia of both eyes (2/6/2013), Hearing loss, History of resection of small bowel, Hyperlipidemia, Hypertension, Hypertensive retinopathy of both eyes, Hypoglycemia, Macular degeneration, Meniere's disease of both ears, OA (osteoarthritis) of shoulder, Osteoporosis, Other and unspecified hyperlipidemia, Posterior vitreous detachment of both eyes, Rhinitis, and TIA (transient ischemic attack). The patient's chief complaint is long, thick toenails. This patient has documented high risk feet requiring routine maintenance secondary to peripheral neuropathy.    PCP: Rubi Young, DO    Date Last Seen by PCP:   Chief Complaint   Patient presents with    Foot Problem     fungus       Last encounter in this department: Visit date not found    Hemoglobin A1C   Date Value Ref Range Status   06/25/2020 5.6 4.0 - 5.6 % Final     Comment:     ADA Screening Guidelines:  5.7-6.4%  Consistent with prediabetes  >or=6.5%  Consistent with diabetes  High levels of fetal hemoglobin interfere with the HbA1C  assay. Heterozygous hemoglobin variants (HbS, HgC, etc)do  not significantly interfere with this assay.   However, presence of multiple variants may affect accuracy.     10/03/2019 6.0 (H) 4.0 - 5.6 % Final     Comment:     ADA Screening Guidelines:  5.7-6.4%  Consistent with prediabetes  >or=6.5%  Consistent with diabetes  High levels of fetal  hemoglobin interfere with the HbA1C  assay. Heterozygous hemoglobin variants (HbS, HgC, etc)do  not significantly interfere with this assay.   However, presence of multiple variants may affect accuracy.     12/03/2018 5.9 (H) 4.0 - 5.6 % Final     Comment:     ADA Screening Guidelines:  5.7-6.4%  Consistent with prediabetes  >or=6.5%  Consistent with diabetes  High levels of fetal hemoglobin interfere with the HbA1C  assay. Heterozygous hemoglobin variants (HbS, HgC, etc)do  not significantly interfere with this assay.   However, presence of multiple variants may affect accuracy.         Review of Systems   Constitution: Negative for chills, decreased appetite and fever.   Cardiovascular: Negative for leg swelling.   Skin: Positive for nail changes.   Musculoskeletal: Negative for arthritis, joint pain, joint swelling and myalgias.   Gastrointestinal: Negative for nausea and vomiting.   Neurological: Negative for loss of balance, numbness and paresthesias.           Objective:      Physical Exam  Constitutional:       Appearance: She is well-developed.   Cardiovascular:      Comments: Dorsalis pedis and posterior tibial pulses are diminished Bilaterally. Toes are cool to touch. Feet are warm proximally.There is decreased digital hair. Skin is atrophic, slightly hyperpigmented, and mildly edematous      Musculoskeletal: Normal range of motion.         General: No tenderness.      Comments: Adequate joint range of motion without pain, limitation, nor crepitation Bilateral feet and ankle joints. Muscle strength is 5/5 in all groups bilaterally.         Skin:     General: Skin is warm and dry.      Findings: No ecchymosis, erythema or lesion.      Comments: Nails x10 are elongated by  2-4mm's, thickened by 2-4 mm's, dystrophic, and are whitish in  coloration . Xerosis Bilaterally. No open lesions noted.       Neurological:      Mental Status: She is alert and oriented to person, place, and time.      Comments:  [place] 5.07 monofilamant testing is diminished Senthil feet. Sharp/dull sensation diminished Bilaterally. Light touch absent Bilaterally.       Psychiatric:         Behavior: Behavior normal.               Assessment:       Encounter Diagnoses   Name Primary?    Idiopathic peripheral neuropathy Yes    Onychomycosis due to dermatophyte     Stage 3 chronic kidney disease, unspecified whether stage 3a or 3b CKD          Plan:       Jenniffer was seen today for foot problem.    Diagnoses and all orders for this visit:    Idiopathic peripheral neuropathy    Onychomycosis due to dermatophyte    Stage 3 chronic kidney disease, unspecified whether stage 3a or 3b CKD      I counseled the patient on her conditions, their implications and medical management.        - Shoe inspection. Patient instructed on proper foot hygeine. We discussed wearing proper shoe gear, daily foot inspections, never walking without protective shoe gear, never putting sharp instruments to feet, routine podiatric nail visits every 2-3 months.      - With patient's permission, nails were aggressively reduced and debrided x 10 to their soft tissue attachment mechanically and with electric , removing all offending nail and debris. Patient relates relief following the procedure. She will continue to monitor the areas daily, inspect her feet, wear protective shoe gear when ambulatory, moisturizer to maintain skin integrity and follow in this office in approximately 2-3 months, sooner p.r.n.

## 2020-11-02 ENCOUNTER — LAB VISIT (OUTPATIENT)
Dept: SURGERY | Facility: CLINIC | Age: 85
DRG: 274 | End: 2020-11-02
Payer: MEDICARE

## 2020-11-02 DIAGNOSIS — I48.91 ATRIAL FIBRILLATION: ICD-10-CM

## 2020-11-02 LAB — SARS-COV-2 RNA RESP QL NAA+PROBE: NOT DETECTED

## 2020-11-02 PROCEDURE — U0003 INFECTIOUS AGENT DETECTION BY NUCLEIC ACID (DNA OR RNA); SEVERE ACUTE RESPIRATORY SYNDROME CORONAVIRUS 2 (SARS-COV-2) (CORONAVIRUS DISEASE [COVID-19]), AMPLIFIED PROBE TECHNIQUE, MAKING USE OF HIGH THROUGHPUT TECHNOLOGIES AS DESCRIBED BY CMS-2020-01-R: HCPCS | Mod: HCNC

## 2020-11-03 NOTE — ANESTHESIA PREPROCEDURE EVALUATION
[facility]  Anesthesia Pre-Operative Evaluation         Patient Name: Jenniffer Jimenez  YOB: 1932  MRN: [id number]    SUBJECTIVE:     Pre-operative evaluation for Procedure(s) (LRB):  Left atrial appendage closure device (N/A)     11/03/2020    Jenniffer Jimenez is a 88 y.o. female w/ a significant PMHx of HTN, HFpEF, a fib on apixaban, prior TIAs (1997, 2005), HLD, CAD, CKD3, hearing loss requiring hearing aids presents for watchman device insertion. Attempted placement 6 weeks prior, but aborted due to left atrial appendage thrombus. Previously on warfarin, switched to apixaban at that time.    LDA: None documented.    Prev airway: None documented.    Drips: None documented.      Patient Active Problem List   Diagnosis    Pure hypercholesterolemia    Pseudophakia, both eyes    Stable central retinal vein occlusion of left eye    Chronic rhinitis    Hearing loss, sensorineural    Hypertension    Arthritis    Exotropia of both eyes    Gait instability    Meniere's disease    Facet arthropathy, lumbosacral    Lumbar spinal stenosis    Chronic diastolic heart failure    Hypermetropia of right eye    Chronic atrial fibrillation    Chronic kidney disease, stage III (moderate)    Atherosclerosis of aorta    Left ventricular diastolic dysfunction with preserved systolic function    History of TIA (transient ischemic attack)    Osteoporosis    Primary osteoarthritis of right shoulder    History of strabismus surgery    Current use of long term anticoagulation    Prediabetes    Refractive error    Posterior vitreous detachment, bilateral    Dry eye syndrome    Overweight (BMI 25.0-29.9)    Risk for falls    OAB (overactive bladder)    Venous insufficiency of both lower extremities    BRVO (branch retinal vein occlusion)    Paroxysmal atrial fibrillation    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 eye with active choroidal neovascularization  "   Stage 2 chronic kidney disease    Hypertensive retinopathy of both eyes    Unspecified inflammatory spondylopathy, lumbosacral region       Review of patient's allergies indicates:   Allergen Reactions    Bactrim [sulfamethoxazole-trimethoprim] Other (See Comments)     "Couldn't walk"    Opioids - morphine analogues Other (See Comments)     Bowel issues; bowel obstruction    Tizanidine Other (See Comments)     "Lips were numb,  Almost passed out."    Tramadol Hallucinations    Beta-blockers (beta-adrenergic blocking agts) Other (See Comments)     Can not go on beta blockers for long period of time - due to taking allergy injections    Phenergan [promethazine] Other (See Comments)     Per pt. request- saw sisters have bad reaction to drug       Current Inpatient Medications:      No current facility-administered medications on file prior to encounter.      Current Outpatient Medications on File Prior to Encounter   Medication Sig Dispense Refill    apixaban (ELIQUIS) 5 mg Tab Take 1 tablet (5 mg total) by mouth 2 (two) times daily. 60 tablet 11    aspirin (ECOTRIN) 81 MG EC tablet Take 1 tablet (81 mg total) by mouth once daily. 360 tablet 0    cholecalciferol, vitamin D3, (VITAMIN D3 ORAL) Take 1 tablet by mouth once daily.      diclofenac sodium (VOLTAREN) 1 % Gel Apply 2 g topically 3 (three) times daily. Apply to ankle for pain 1 Tube 0    furosemide (LASIX) 20 MG tablet TAKE 1 TABLET BY MOUTH DAILY; MAY TAKE AN ADDITIONAL TABLET AS NEEDED FOR SWELLING. 100 tablet 3    multivitamin (ONE DAILY MULTIVITAMIN) per tablet Take 1 tablet by mouth once daily.      mupirocin (BACTROBAN) 2 % ointment Apply to affected area 3 times daily 22 g 1    peg 400-propylene glycol (SYSTANE) 0.4-0.3 % Drop Place 1-2 drops into both eyes as needed.       potassium chloride (MICRO-K) 10 MEQ CpSR TAKE 1 CAPSULE EVERY DAY 90 capsule 3    triamcinolone (NASACORT) 55 mcg nasal inhaler 2 sprays by Nasal route once " daily. 17 g 0       Past Surgical History:   Procedure Laterality Date    APPENDECTOMY      CARDIAC CATHETERIZATION      CATARACT EXTRACTION W/  INTRAOCULAR LENS IMPLANT Bilateral      SECTION, CLASSIC      HYSTERECTOMY      INNER EAR SURGERY      JOINT REPLACEMENT      LEFT KNEE REPLACEMENT IN 2013 -    OOPHORECTOMY      SINUS SURGERY      STRABISMUS SURGERY  13    RSR recession 5 mm, LLR recession 5 mm and LMR resection 4mm    STRABISMUS SURGERY  2014    recess LR OD 6mm    TONSILLECTOMY         Social History     Socioeconomic History    Marital status:      Spouse name: Not on file    Number of children: Not on file    Years of education: Not on file    Highest education level: Not on file   Occupational History    Not on file   Social Needs    Financial resource strain: Not very hard    Food insecurity     Worry: Sometimes true     Inability: Sometimes true    Transportation needs     Medical: Yes     Non-medical: Yes   Tobacco Use    Smoking status: Former Smoker     Types: Cigarettes     Quit date: 10/29/1982     Years since quittin.0    Smokeless tobacco: Never Used   Substance and Sexual Activity    Alcohol use: Not Currently     Alcohol/week: 0.0 standard drinks     Comment: socially    Drug use: No    Sexual activity: Never   Lifestyle    Physical activity     Days per week: 7 days     Minutes per session: 20 min    Stress: Only a little   Relationships    Social connections     Talks on phone: More than three times a week     Gets together: More than three times a week     Attends Islam service: Not on file     Active member of club or organization: Not on file     Attends meetings of clubs or organizations: Not on file     Relationship status:    Other Topics Concern    Patient feels they ought to cut down on drinking/drug use Not Asked    Patient annoyed by others criticizing their drinking/drug use Not Asked    Patient has felt  bad or guilty about drinking/drug use Not Asked    Patient has had a drink/used drugs as an eye opener in the AM Not Asked    Are you pregnant or think you may be? No    Breast-feeding No   Social History Narrative    Not on file       OBJECTIVE:     Vital Signs Range (Last 24H):         Significant Labs:  Lab Results   Component Value Date    WBC 3.37 (L) 10/06/2020    HGB 10.4 (L) 10/06/2020    HCT 31.8 (L) 10/06/2020     10/06/2020    CHOL 206 (H) 06/25/2020    TRIG 89 06/25/2020    HDL 74 06/25/2020    ALT 12 06/26/2020    AST 18 06/26/2020     10/06/2020    K 4.6 10/06/2020     10/06/2020    CREATININE 0.9 10/06/2020    BUN 15 10/06/2020    CO2 29 10/06/2020    TSH 2.834 06/25/2020    INR 1.0 10/06/2020    GLUF 101 08/14/2007    HGBA1C 5.6 06/25/2020       Diagnostic Studies: No relevant studies.    EKG:   Results for orders placed or performed during the hospital encounter of 08/05/20   EKG 12-lead    Collection Time: 08/05/20 10:53 AM    Narrative    Test Reason : I48.0,    Vent. Rate : 067 BPM     Atrial Rate : 000 BPM     P-R Int : 000 ms          QRS Dur : 082 ms      QT Int : 404 ms       P-R-T Axes : 000 069 015 degrees     QTc Int : 426 ms    Atrial fibrillation  Low voltage QRS  Nonspecific T wave abnormality  Abnormal ECG  When compared with ECG of 25-JUN-2020 17:28,  No significant change was found  Confirmed by RAMAN SMITH, HOMEYAR ([id number]) on 8/6/2020 9:47:28 AM    Referred By: LAURA CANTRELL           Confirmed By:PORSCHE CAMARGO MD       ECHOCARDIOGRAM:    SONYA:  Results for orders placed or performed during the hospital encounter of 08/05/20   Transesophageal echo (SONYA)   Result Value Ref Range    BSA 1.87 m2    TR Max Rg 2.50 m/s    Triscuspid Valve Regurgitation Peak Gradient 25 mmHg    Narrative    · Severe right atrial enlargement.  · Severe left atrial enlargement.  · Normal left ventricular systolic function. The estimated ejection   fraction is 55%.  · Normal right  ventricular systolic function.  · SEC and Thrombus is present in the appendage with echo cotnrast  · Moderate to severe tricuspid regurgitation.  · Moderate mitral regurgitation.  · Normal LV diastolic function.     45 degree 2.2 cm width and length of 2.7 cm  90 degree 2.1 cm width and length of 3.1cm  120 degree 2.1 cm width and length of 2.1 cm  0 degree 2.2 cm width and length of 3.6 cm       STRESS:  No results found.         ASSESSMENT/PLAN:         Anesthesia Evaluation    I have reviewed the Patient Summary Reports.    I have reviewed the Nursing Notes. I have reviewed the NPO Status.   I have reviewed the Medications.     Review of Systems  Anesthesia Hx:  History of prior surgery of interest to airway management or planning: Previous anesthesia: General 4/22/13 left TKA with general anesthesia.  Denies Family Hx of Anesthesia complications.   Denies Personal Hx of Anesthesia complications.   Social:  Denies Tobacco Use. Denies Alcohol Use.  Denies Illicit Drug Use.  Hematology/Oncology:  Hematology Normal   Oncology Normal     EENT/Dental:   Caps upper and lower Eyes: Eye Symptoms: of dry eyes. Eye Disease: Strabismus:  Macular degeneration  Ears General/Symptom(s) Hearing Impairment: deaf - left menieres disease   Cardiovascular:   Exercise tolerance: good Hypertension CAD   CHF  Coronary Artery Disease: (diastolic dysfunction)  Hypertension    Pulmonary:   Denies Shortness of breath.  Pulmonary Symptoms: (chest congestion nasal drip and productive cough )  are currently symptomatic and productive cough.    Renal/:   Chronic Renal Disease, CRI    Hepatic/GI:   GERD (no longer on meds. did not like the way it made her feel)    Musculoskeletal:   Arthritis   Musculoskeletal General/Symptoms: muscle weakness, generalized. Functional capacity is unlimited. Legs.   Neurological:   TIA,    Endocrine:  Metabolic Disorders, Hyperlipoproteinemia  Psych:   Psychiatric History          Physical  Exam  General:  Well nourished, Obesity    Airway/Jaw/Neck:  Airway Findings: Mouth Opening: Normal Tongue: Normal  General Airway Assessment: Adult  Mallampati: I  Improves to I with phonation.  TM Distance: Normal, at least 6 cm        Eyes/Ears/Nose:  EYES/EARS/NOSE FINDINGS: Normal   Dental:  DENTAL FINDINGS: Normal   Chest/Lungs:  Chest/Lungs Findings: Clear to auscultation, Normal Respiratory Rate     Heart/Vascular:  Heart Findings: Rate: Normal  Rhythm: Regular Rhythm  Sounds: Normal     Abdomen:  Abdomen Findings: Normal    Musculoskeletal:  Musculoskeletal Findings: Normal   Skin:  Skin Findings: Normal    Mental Status:  Mental Status Findings:  Cooperative, Alert and Oriented         Anesthesia Plan  Type of Anesthesia, risks & benefits discussed:  Anesthesia Type:  general, MAC  Patient's Preference:   Intra-op Monitoring Plan: standard ASA monitors and arterial line  Intra-op Monitoring Plan Comments:   Post Op Pain Control Plan: multimodal analgesia and IV/PO Opioids PRN  Post Op Pain Control Plan Comments:   Induction:   IV  Beta Blocker:  Patient is not currently on a Beta-Blocker (No further documentation required).       Informed Consent: Patient understands risks and agrees with Anesthesia plan.  Questions answered. Anesthesia consent signed with patient.  ASA Score: 3     Day of Surgery Review of History & Physical: I have interviewed and examined the patient. I have reviewed the patient's H&P dated:    H&P update referred to the surgeon.         Ready For Surgery From Anesthesia Perspective.

## 2020-11-04 ENCOUNTER — HOSPITAL ENCOUNTER (INPATIENT)
Facility: HOSPITAL | Age: 85
LOS: 1 days | Discharge: HOME HEALTH | DRG: 274 | End: 2020-11-05
Attending: INTERNAL MEDICINE | Admitting: INTERNAL MEDICINE
Payer: MEDICARE

## 2020-11-04 ENCOUNTER — EDUCATION (OUTPATIENT)
Dept: CARDIOLOGY | Facility: CLINIC | Age: 85
End: 2020-11-04

## 2020-11-04 DIAGNOSIS — I48.11 LONGSTANDING PERSISTENT ATRIAL FIBRILLATION: ICD-10-CM

## 2020-11-04 DIAGNOSIS — Z01.812 PRE-PROCEDURE LAB EXAM: ICD-10-CM

## 2020-11-04 DIAGNOSIS — I48.11 LONGSTANDING PERSISTENT ATRIAL FIBRILLATION: Primary | ICD-10-CM

## 2020-11-04 DIAGNOSIS — Z95.818 PRESENCE OF WATCHMAN LEFT ATRIAL APPENDAGE CLOSURE DEVICE: ICD-10-CM

## 2020-11-04 LAB
ABO + RH BLD: NORMAL
ANION GAP SERPL CALC-SCNC: 11 MMOL/L (ref 8–16)
BLD GP AB SCN CELLS X3 SERPL QL: NORMAL
BUN SERPL-MCNC: 20 MG/DL (ref 8–23)
CALCIUM SERPL-MCNC: 8.4 MG/DL (ref 8.7–10.5)
CHLORIDE SERPL-SCNC: 108 MMOL/L (ref 95–110)
CO2 SERPL-SCNC: 25 MMOL/L (ref 23–29)
CREAT SERPL-MCNC: 1 MG/DL (ref 0.5–1.4)
EST. GFR  (AFRICAN AMERICAN): 58.1 ML/MIN/1.73 M^2
EST. GFR  (NON AFRICAN AMERICAN): 50.4 ML/MIN/1.73 M^2
GLUCOSE SERPL-MCNC: 98 MG/DL (ref 70–110)
POTASSIUM SERPL-SCNC: 4 MMOL/L (ref 3.5–5.1)
SODIUM SERPL-SCNC: 144 MMOL/L (ref 136–145)

## 2020-11-04 PROCEDURE — 93010 ELECTROCARDIOGRAM REPORT: CPT | Mod: ,,, | Performed by: INTERNAL MEDICINE

## 2020-11-04 PROCEDURE — 86901 BLOOD TYPING SEROLOGIC RH(D): CPT | Mod: HCNC

## 2020-11-04 PROCEDURE — 20600001 HC STEP DOWN PRIVATE ROOM: Mod: HCNC

## 2020-11-04 PROCEDURE — 93005 ELECTROCARDIOGRAM TRACING: CPT | Mod: HCNC

## 2020-11-04 PROCEDURE — C1887 CATHETER, GUIDING: HCPCS | Mod: HCNC | Performed by: INTERNAL MEDICINE

## 2020-11-04 PROCEDURE — 37000008 HC ANESTHESIA 1ST 15 MINUTES: Mod: HCNC | Performed by: INTERNAL MEDICINE

## 2020-11-04 PROCEDURE — 36620 INSERTION CATHETER ARTERY: CPT | Mod: 59,HCNC,, | Performed by: STUDENT IN AN ORGANIZED HEALTH CARE EDUCATION/TRAINING PROGRAM

## 2020-11-04 PROCEDURE — 86850 RBC ANTIBODY SCREEN: CPT | Mod: HCNC

## 2020-11-04 PROCEDURE — C1751 CATH, INF, PER/CENT/MIDLINE: HCPCS | Mod: HCNC | Performed by: STUDENT IN AN ORGANIZED HEALTH CARE EDUCATION/TRAINING PROGRAM

## 2020-11-04 PROCEDURE — 25000003 PHARM REV CODE 250: Mod: HCNC | Performed by: INTERNAL MEDICINE

## 2020-11-04 PROCEDURE — 80048 BASIC METABOLIC PNL TOTAL CA: CPT | Mod: HCNC

## 2020-11-04 PROCEDURE — 33340 PR TRANSCATH CLOSURE, PERC, LEFT ATRIAL APPENDAGE, W/IMPLANT: ICD-10-PCS | Mod: HCNC,,, | Performed by: INTERNAL MEDICINE

## 2020-11-04 PROCEDURE — 25000003 PHARM REV CODE 250: Mod: HCNC | Performed by: STUDENT IN AN ORGANIZED HEALTH CARE EDUCATION/TRAINING PROGRAM

## 2020-11-04 PROCEDURE — 27201423 OPTIME MED/SURG SUP & DEVICES STERILE SUPPLY: Mod: HCNC | Performed by: INTERNAL MEDICINE

## 2020-11-04 PROCEDURE — 27800903 OPTIME MED/SURG SUP & DEVICES OTHER IMPLANTS: Mod: HCNC | Performed by: INTERNAL MEDICINE

## 2020-11-04 PROCEDURE — 33340 PERQ CLSR TCAT L ATR APNDGE: CPT | Mod: HCNC,,, | Performed by: INTERNAL MEDICINE

## 2020-11-04 PROCEDURE — 25500020 PHARM REV CODE 255: Mod: HCNC | Performed by: INTERNAL MEDICINE

## 2020-11-04 PROCEDURE — S5010 5% DEXTROSE AND 0.45% SALINE: HCPCS | Mod: HCNC | Performed by: INTERNAL MEDICINE

## 2020-11-04 PROCEDURE — 33340 PERQ CLSR TCAT L ATR APNDGE: CPT | Mod: HCNC | Performed by: INTERNAL MEDICINE

## 2020-11-04 PROCEDURE — 36620 ARTERIAL: ICD-10-PCS | Mod: 59,HCNC,, | Performed by: STUDENT IN AN ORGANIZED HEALTH CARE EDUCATION/TRAINING PROGRAM

## 2020-11-04 PROCEDURE — C1894 INTRO/SHEATH, NON-LASER: HCPCS | Mod: HCNC | Performed by: INTERNAL MEDICINE

## 2020-11-04 PROCEDURE — 63600175 PHARM REV CODE 636 W HCPCS: Mod: HCNC | Performed by: STUDENT IN AN ORGANIZED HEALTH CARE EDUCATION/TRAINING PROGRAM

## 2020-11-04 PROCEDURE — A4216 STERILE WATER/SALINE, 10 ML: HCPCS | Mod: HCNC | Performed by: STUDENT IN AN ORGANIZED HEALTH CARE EDUCATION/TRAINING PROGRAM

## 2020-11-04 PROCEDURE — D9220A PRA ANESTHESIA: ICD-10-PCS | Mod: HCNC,,, | Performed by: STUDENT IN AN ORGANIZED HEALTH CARE EDUCATION/TRAINING PROGRAM

## 2020-11-04 PROCEDURE — D9220A PRA ANESTHESIA: Mod: HCNC,,, | Performed by: STUDENT IN AN ORGANIZED HEALTH CARE EDUCATION/TRAINING PROGRAM

## 2020-11-04 PROCEDURE — 37000009 HC ANESTHESIA EA ADD 15 MINS: Mod: HCNC | Performed by: INTERNAL MEDICINE

## 2020-11-04 PROCEDURE — 94761 N-INVAS EAR/PLS OXIMETRY MLT: CPT | Mod: HCNC

## 2020-11-04 PROCEDURE — 93010 EKG 12-LEAD: ICD-10-PCS | Mod: ,,, | Performed by: INTERNAL MEDICINE

## 2020-11-04 PROCEDURE — C1760 CLOSURE DEV, VASC: HCPCS | Mod: HCNC | Performed by: INTERNAL MEDICINE

## 2020-11-04 PROCEDURE — C1769 GUIDE WIRE: HCPCS | Mod: HCNC | Performed by: INTERNAL MEDICINE

## 2020-11-04 PROCEDURE — 36415 COLL VENOUS BLD VENIPUNCTURE: CPT | Mod: HCNC

## 2020-11-04 DEVICE — LEFT ATRIAL APPENDAGE CLOSURE DEVICE WITH DELIVERY SYSTEM
Type: IMPLANTABLE DEVICE | Site: HEART | Status: FUNCTIONAL
Brand: WATCHMAN FLX™

## 2020-11-04 RX ORDER — PROPOFOL 10 MG/ML
VIAL (ML) INTRAVENOUS
Status: DISCONTINUED | OUTPATIENT
Start: 2020-11-04 | End: 2020-11-04

## 2020-11-04 RX ORDER — ACETAMINOPHEN 325 MG/1
650 TABLET ORAL EVERY 4 HOURS PRN
Status: DISCONTINUED | OUTPATIENT
Start: 2020-11-04 | End: 2020-11-05 | Stop reason: HOSPADM

## 2020-11-04 RX ORDER — SODIUM CHLORIDE 9 MG/ML
INJECTION, SOLUTION INTRAVENOUS CONTINUOUS PRN
Status: DISCONTINUED | OUTPATIENT
Start: 2020-11-04 | End: 2020-11-04

## 2020-11-04 RX ORDER — SODIUM CHLORIDE 0.9 % (FLUSH) 0.9 %
10 SYRINGE (ML) INJECTION
Status: DISCONTINUED | OUTPATIENT
Start: 2020-11-04 | End: 2020-11-05 | Stop reason: HOSPADM

## 2020-11-04 RX ORDER — PRAVASTATIN SODIUM 40 MG/1
40 TABLET ORAL DAILY
Status: DISCONTINUED | OUTPATIENT
Start: 2020-11-04 | End: 2020-11-05 | Stop reason: HOSPADM

## 2020-11-04 RX ORDER — DOCUSATE SODIUM 100 MG/1
100 CAPSULE, LIQUID FILLED ORAL DAILY
Status: DISCONTINUED | OUTPATIENT
Start: 2020-11-04 | End: 2020-11-05 | Stop reason: HOSPADM

## 2020-11-04 RX ORDER — ASPIRIN 81 MG/1
81 TABLET ORAL DAILY
Status: DISCONTINUED | OUTPATIENT
Start: 2020-11-04 | End: 2020-11-05 | Stop reason: HOSPADM

## 2020-11-04 RX ORDER — ONDANSETRON 2 MG/ML
4 INJECTION INTRAMUSCULAR; INTRAVENOUS DAILY PRN
Status: CANCELLED | OUTPATIENT
Start: 2020-11-04

## 2020-11-04 RX ORDER — FENTANYL CITRATE 50 UG/ML
INJECTION, SOLUTION INTRAMUSCULAR; INTRAVENOUS
Status: DISCONTINUED | OUTPATIENT
Start: 2020-11-04 | End: 2020-11-04

## 2020-11-04 RX ORDER — PHENYLEPHRINE HYDROCHLORIDE 10 MG/ML
INJECTION INTRAVENOUS
Status: DISCONTINUED | OUTPATIENT
Start: 2020-11-04 | End: 2020-11-04

## 2020-11-04 RX ORDER — HEPARIN SODIUM 1000 [USP'U]/ML
INJECTION, SOLUTION INTRAVENOUS; SUBCUTANEOUS
Status: DISCONTINUED | OUTPATIENT
Start: 2020-11-04 | End: 2020-11-04

## 2020-11-04 RX ORDER — FUROSEMIDE 20 MG/1
20 TABLET ORAL DAILY
Status: DISCONTINUED | OUTPATIENT
Start: 2020-11-04 | End: 2020-11-05 | Stop reason: HOSPADM

## 2020-11-04 RX ORDER — DEXTROSE MONOHYDRATE AND SODIUM CHLORIDE 5; .45 G/100ML; G/100ML
INJECTION, SOLUTION INTRAVENOUS CONTINUOUS
Status: DISCONTINUED | OUTPATIENT
Start: 2020-11-04 | End: 2020-11-04

## 2020-11-04 RX ORDER — LIDOCAINE HYDROCHLORIDE 20 MG/ML
INJECTION INTRAVENOUS
Status: DISCONTINUED | OUTPATIENT
Start: 2020-11-04 | End: 2020-11-04

## 2020-11-04 RX ORDER — LIDOCAINE HYDROCHLORIDE 20 MG/ML
SOLUTION OROPHARYNGEAL
Status: DISCONTINUED | OUTPATIENT
Start: 2020-11-04 | End: 2020-11-04

## 2020-11-04 RX ORDER — DIPHENHYDRAMINE HCL 50 MG
50 CAPSULE ORAL ONCE
Status: COMPLETED | OUTPATIENT
Start: 2020-11-04 | End: 2020-11-04

## 2020-11-04 RX ORDER — PROTAMINE SULFATE 10 MG/ML
INJECTION, SOLUTION INTRAVENOUS
Status: DISCONTINUED | OUTPATIENT
Start: 2020-11-04 | End: 2020-11-04

## 2020-11-04 RX ORDER — FLUTICASONE PROPIONATE 50 MCG
2 SPRAY, SUSPENSION (ML) NASAL DAILY
Status: DISCONTINUED | OUTPATIENT
Start: 2020-11-05 | End: 2020-11-05 | Stop reason: HOSPADM

## 2020-11-04 RX ORDER — DEXMEDETOMIDINE HYDROCHLORIDE 100 UG/ML
INJECTION, SOLUTION INTRAVENOUS
Status: DISCONTINUED | OUTPATIENT
Start: 2020-11-04 | End: 2020-11-04

## 2020-11-04 RX ADMIN — PROTAMINE SULFATE 20 MG: 10 INJECTION, SOLUTION INTRAVENOUS at 08:11

## 2020-11-04 RX ADMIN — PROPOFOL 10 MG: 10 INJECTION, EMULSION INTRAVENOUS at 08:11

## 2020-11-04 RX ADMIN — DEXMEDETOMIDINE HYDROCHLORIDE 8 MCG: 100 INJECTION, SOLUTION, CONCENTRATE INTRAVENOUS at 07:11

## 2020-11-04 RX ADMIN — LIDOCAINE HYDROCHLORIDE 20 MG: 20 INJECTION, SOLUTION INTRAVENOUS at 07:11

## 2020-11-04 RX ADMIN — DOCUSATE SODIUM 100 MG: 100 CAPSULE, LIQUID FILLED ORAL at 03:11

## 2020-11-04 RX ADMIN — SODIUM CHLORIDE, SODIUM GLUCONATE, SODIUM ACETATE, POTASSIUM CHLORIDE, MAGNESIUM CHLORIDE, SODIUM PHOSPHATE, DIBASIC, AND POTASSIUM PHOSPHATE: .53; .5; .37; .037; .03; .012; .00082 INJECTION, SOLUTION INTRAVENOUS at 07:11

## 2020-11-04 RX ADMIN — HEPARIN SODIUM 6500 UNITS: 1000 INJECTION INTRAVENOUS; SUBCUTANEOUS at 08:11

## 2020-11-04 RX ADMIN — PHENYLEPHRINE HYDROCHLORIDE 100 MCG: 10 INJECTION, SOLUTION INTRAMUSCULAR; INTRAVENOUS; SUBCUTANEOUS at 09:11

## 2020-11-04 RX ADMIN — SODIUM CHLORIDE: 0.9 INJECTION, SOLUTION INTRAVENOUS at 07:11

## 2020-11-04 RX ADMIN — DEXTROSE AND SODIUM CHLORIDE: 5; .45 INJECTION, SOLUTION INTRAVENOUS at 06:11

## 2020-11-04 RX ADMIN — PROTAMINE SULFATE 10 MG: 10 INJECTION, SOLUTION INTRAVENOUS at 08:11

## 2020-11-04 RX ADMIN — PHENYLEPHRINE HYDROCHLORIDE 100 MCG: 10 INJECTION, SOLUTION INTRAMUSCULAR; INTRAVENOUS; SUBCUTANEOUS at 07:11

## 2020-11-04 RX ADMIN — PROPOFOL 20 MG: 10 INJECTION, EMULSION INTRAVENOUS at 07:11

## 2020-11-04 RX ADMIN — HEPARIN SODIUM 2500 UNITS: 1000 INJECTION INTRAVENOUS; SUBCUTANEOUS at 08:11

## 2020-11-04 RX ADMIN — FENTANYL CITRATE 25 MCG: 50 INJECTION INTRAMUSCULAR; INTRAVENOUS at 08:11

## 2020-11-04 RX ADMIN — DEXTROSE 2 G: 50 INJECTION, SOLUTION INTRAVENOUS at 08:11

## 2020-11-04 RX ADMIN — DEXMEDETOMIDINE HYDROCHLORIDE 1 MCG/KG/HR: 100 INJECTION, SOLUTION, CONCENTRATE INTRAVENOUS at 07:11

## 2020-11-04 RX ADMIN — FENTANYL CITRATE 25 MCG: 50 INJECTION INTRAMUSCULAR; INTRAVENOUS at 07:11

## 2020-11-04 RX ADMIN — FUROSEMIDE 20 MG: 20 TABLET ORAL at 03:11

## 2020-11-04 RX ADMIN — PRAVASTATIN SODIUM 40 MG: 40 TABLET ORAL at 03:11

## 2020-11-04 RX ADMIN — PROPOFOL 30 MG: 10 INJECTION, EMULSION INTRAVENOUS at 07:11

## 2020-11-04 RX ADMIN — PHENYLEPHRINE HYDROCHLORIDE 100 MCG: 10 INJECTION, SOLUTION INTRAMUSCULAR; INTRAVENOUS; SUBCUTANEOUS at 08:11

## 2020-11-04 RX ADMIN — DIPHENHYDRAMINE HYDROCHLORIDE 50 MG: 50 CAPSULE ORAL at 06:11

## 2020-11-04 RX ADMIN — LIDOCAINE HYDROCHLORIDE 15 ML: 20 SOLUTION ORAL; TOPICAL at 07:11

## 2020-11-04 RX ADMIN — HEPARIN SODIUM 3000 UNITS: 1000 INJECTION INTRAVENOUS; SUBCUTANEOUS at 08:11

## 2020-11-04 RX ADMIN — SODIUM CHLORIDE: 0.9 INJECTION, SOLUTION INTRAVENOUS at 08:11

## 2020-11-04 RX ADMIN — PROTAMINE SULFATE 50 MG: 10 INJECTION, SOLUTION INTRAVENOUS at 08:11

## 2020-11-04 RX ADMIN — APIXABAN 5 MG: 5 TABLET, FILM COATED ORAL at 09:11

## 2020-11-04 RX ADMIN — ASPIRIN 81 MG: 81 TABLET, COATED ORAL at 03:11

## 2020-11-04 NOTE — ANESTHESIA PROCEDURE NOTES
Arterial    Diagnosis: a fib    Patient location during procedure: done in OR  Procedure start time: 7/22/2020 7:44 AM  Timeout: 7/21/2020 7:44 AM  Procedure end time: 7/30/2020 7:44 AM    Staffing  Authorizing Provider: Sarabjit Murdock MD  Performing Provider: Uzair Swanson MD    Anesthesiologist was present at the time of the procedure.    Preanesthetic Checklist  Completed: patient identified, site marked, surgical consent, pre-op evaluation, timeout performed, IV checked, risks and benefits discussed, monitors and equipment checked and anesthesia consent givenArterial  Skin Prep: chlorhexidine gluconate  Local Infiltration: lidocaine  Orientation: right  Location: radial  Catheter Size: 20 GInsertion Attempts: 1  Assessment  Dressing: secured with tape and tegaderm, tegaderm and secured with tape  Patient: Tolerated well

## 2020-11-04 NOTE — PROGRESS NOTES
Oral airway out patient waking from procedure updated patient's grand daughter on patient location with the permission of patient.

## 2020-11-04 NOTE — SUBJECTIVE & OBJECTIVE
"Past Medical History:   Diagnosis Date    Amblyopia of left eye 4/10/2013    Anticoagulant long-term use     Coumadin - A-Fib    Anxiety     Arthritis     Facet arthropathy, Lumbosacral    Atherosclerosis of aorta     Atrial fibrillation     Auricular fibrillation     Central retinal vein occlusion of left eye     CHF (congestive heart failure)     Chronic kidney disease, stage 3     Coronary artery disease     Depression     Diastolic heart failure 2014    Exotropia of both eyes 2013    recession RSR 5.0mm w/ adj; recession LR os 5.0 w/ adj; resect MR os  4.0mm    Hearing loss     History of resection of small bowel     Hyperlipidemia     Hypertension     Hypertensive retinopathy of both eyes     Hypoglycemia     Macular degeneration     Meniere's disease of both ears     OA (osteoarthritis) of shoulder     Right    Osteoporosis     Other and unspecified hyperlipidemia     Posterior vitreous detachment of both eyes     Rhinitis     TIA (transient ischemic attack)        Past Surgical History:   Procedure Laterality Date    APPENDECTOMY      CARDIAC CATHETERIZATION      CATARACT EXTRACTION W/  INTRAOCULAR LENS IMPLANT Bilateral      SECTION, CLASSIC      HYSTERECTOMY      INNER EAR SURGERY      JOINT REPLACEMENT      LEFT KNEE REPLACEMENT IN  -    OOPHORECTOMY      SINUS SURGERY      STRABISMUS SURGERY  13    RSR recession 5 mm, LLR recession 5 mm and LMR resection 4mm    STRABISMUS SURGERY  2014    recess LR OD 6mm    TONSILLECTOMY         Review of patient's allergies indicates:   Allergen Reactions    Bactrim [sulfamethoxazole-trimethoprim] Other (See Comments)     "Couldn't walk"    Opioids - morphine analogues Other (See Comments)     Bowel issues; bowel obstruction    Tizanidine Other (See Comments)     "Lips were numb,  Almost passed out."    Tramadol Hallucinations    Beta-blockers (beta-adrenergic blocking agts) Other (See Comments) "     Can not go on beta blockers for long period of time - due to taking allergy injections    Phenergan [promethazine] Other (See Comments)     Per pt. request- saw sisters have bad reaction to drug       No current facility-administered medications on file prior to encounter.      Current Outpatient Medications on File Prior to Encounter   Medication Sig    apixaban (ELIQUIS) 5 mg Tab Take 1 tablet (5 mg total) by mouth 2 (two) times daily.    aspirin (ECOTRIN) 81 MG EC tablet Take 1 tablet (81 mg total) by mouth once daily.    furosemide (LASIX) 20 MG tablet TAKE 1 TABLET BY MOUTH DAILY; MAY TAKE AN ADDITIONAL TABLET AS NEEDED FOR SWELLING.    potassium chloride (MICRO-K) 10 MEQ CpSR TAKE 1 CAPSULE EVERY DAY    cholecalciferol, vitamin D3, (VITAMIN D3 ORAL) Take 1 tablet by mouth once daily.    diclofenac sodium (VOLTAREN) 1 % Gel Apply 2 g topically 3 (three) times daily. Apply to ankle for pain    multivitamin (ONE DAILY MULTIVITAMIN) per tablet Take 1 tablet by mouth once daily.    mupirocin (BACTROBAN) 2 % ointment Apply to affected area 3 times daily    peg 400-propylene glycol (SYSTANE) 0.4-0.3 % Drop Place 1-2 drops into both eyes as needed.     triamcinolone (NASACORT) 55 mcg nasal inhaler 2 sprays by Nasal route once daily.     Family History     Problem Relation (Age of Onset)    Breast cancer Maternal Aunt    Diabetes Sister, Brother    Heart disease Sister, Sister    Hypertension Mother, Father    Liver disease Sister    No Known Problems Maternal Uncle, Paternal Aunt, Paternal Uncle, Maternal Grandmother, Maternal Grandfather, Paternal Grandmother, Paternal Grandfather, Daughter, Son, Son, Son        Tobacco Use    Smoking status: Former Smoker     Types: Cigarettes     Quit date: 10/29/1982     Years since quittin.0    Smokeless tobacco: Never Used   Substance and Sexual Activity    Alcohol use: Not Currently     Alcohol/week: 0.0 standard drinks     Comment: socially    Drug use:  No    Sexual activity: Never     Review of Systems   All other systems reviewed and are negative.    Objective:     Vital Signs (Most Recent):  Temp: 97.3 °F (36.3 °C) (11/04/20 0633)  Pulse: 70 (11/04/20 0633)  Resp: 18 (11/04/20 0633)  BP: (!) 143/65 (11/04/20 0635)  SpO2: 100 % (11/04/20 0633) Vital Signs (24h Range):  Temp:  [97.3 °F (36.3 °C)] 97.3 °F (36.3 °C)  Pulse:  [70] 70  Resp:  [18] 18  SpO2:  [100 %] 100 %  BP: (143-159)/(65-69) 143/65     Weight: 74.4 kg (164 lb)  Body mass index is 26.88 kg/m².    SpO2: 100 %  O2 Device (Oxygen Therapy): room air    No intake or output data in the 24 hours ending 11/04/20 0713    Lines/Drains/Airways     Epidural Line                 Perineural Analgesia/Anesthesia Assessment (using dermatomes) Epidural -- days         Perineural Analgesia/Anesthesia Assessment (using dermatomes) Epidural -- days          Peripheral Intravenous Line                 Peripheral IV - Single Lumen 06/25/20 1434 20 G Left Antecubital 131 days         Peripheral IV - Single Lumen 11/04/20 0643 20 G Left Antecubital less than 1 day                Physical Exam   Constitutional: She is oriented to person, place, and time. She appears well-developed and well-nourished. No distress.   HENT:   Head: Normocephalic and atraumatic.   Eyes: Pupils are equal, round, and reactive to light. EOM are normal.   Neck: Normal range of motion. JVD present.   Cardiovascular: Normal rate, normal heart sounds and intact distal pulses. Exam reveals no gallop and no friction rub.   No murmur heard.  Irregularly irregular rhythm   Pulmonary/Chest: Effort normal and breath sounds normal. No respiratory distress. She has no wheezes. She has no rales.   Abdominal: Soft. Bowel sounds are normal. She exhibits no distension. There is no abdominal tenderness.   Musculoskeletal: Normal range of motion.         General: Edema (3+ bilateral lower extremity edema) present.   Neurological: She is alert and oriented to  person, place, and time.   Skin: Skin is warm. No rash noted. She is not diaphoretic.   Psychiatric: She has a normal mood and affect. Her behavior is normal.       Significant Labs:     CBC auto differential  Order: [id number]  Status:  Final result   Visible to patient:  Yes (Patient Portal) Next appt:  Today at 08:00 AM in Cardiology (INPATIENT, [facility]) Dx:  Paroxysmal atrial fibrillation   Ref Range & Units 4wk ago  (10/6/20) 3mo ago  (8/5/20) 4mo ago  (6/26/20) 4mo ago  (6/25/20) 5mo ago  (5/11/20) 10mo ago  (1/1/20) 1yr ago  (10/3/19)   WBC 3.90 - 12.70 K/uL 3.37Low   2.98Low   2.69Low   3.79Low   3.25Low   5.95  4.79    RBC 4.00 - 5.40 M/uL 3.39Low   3.45Low   3.57Low   3.91Low   3.88Low   3.87Low   4.19    Hemoglobin 12.0 - 16.0 g/dL 10.4Low   10.6Low   10.7Low   11.9Low   11.6Low   11.6Low   12.5    Hematocrit 37.0 - 48.5 % 31.8Low   31.9Low   32.5Low   36.0Low   36.8Low   34.7Low   38.8    MCV 82 - 98 fL 94  93  91  92  95  90  93    MCH 27.0 - 31.0 pg 30.7  30.7  30.0  30.4  29.9  30.0  29.8    MCHC 32.0 - 36.0 g/dL 32.7  33.2  32.9  33.1  31.5Low   33.4  32.2    RDW 11.5 - 14.5 % 14.8High   15.4High   14.7High   15.0High   15.0High   15.7High   15.0High     Platelets 150 - 350 K/uL 173  165  152  171  196  208  232    MPV 9.2 - 12.9 fL 10.4  10.5  10.2  9.8  11.1  10.5  10.9    Immature Granulocytes 0.0 - 0.5 % 0.6High    0.4  0.3   0.2  0.2    Gran # (ANC) 1.8 - 7.7 K/uL 2.2   1.3Low   2.5   4.6  3.3    Immature Grans (Abs) 0.00 - 0.04 K/uL 0.02   0.01 CM  0.01 CM   0.01 CM  0.01 CM    Comment: Mild elevation in immature granulocytes is non specific and   can be seen in a variety of conditions including stress response,   acute inflammation, trauma and pregnancy. Correlation with other   laboratory and clinical findings is essential.    Lymph # 1.0 - 4.8 K/uL 0.8Low    1.0  1.0   0.7Low   1.0    Mono # 0.3 - 1.0 K/uL 0.3   0.3  0.3   0.5  0.4    Eos # 0.0 - 0.5 K/uL 0.0   0.1  0.0   0.1  0.1    Baso # 0.00  - 0.20 K/uL 0.03   0.02  0.02   0.02  0.03    nRBC 0 /100 WBC 0   0  0   0  0    Gran % 38.0 - 73.0 % 65.9   48.8  65.1   76.7High   69.1    Lymph % 18.0 - 48.0 % 22.8   36.4  25.1   12.4Low   20.7    Mono % 4.0 - 15.0 % 8.6   11.5  8.2   9.1  7.9    Eosinophil % 0.0 - 8.0 % 1.2   2.2  0.8   1.3  1.5    Basophil % 0.0 - 1.9 % 0.9   0.7  0.5   0.3  0.6    Differential Method  Automated   Automated                    Significant Imaging:     I have reviewed all pertinent imaging studies

## 2020-11-04 NOTE — CONSULTS
[facility] - Short Stay Cardiac Unit  Cardiology  Consult Note    Patient Name: Jenniffer Jimenez  MRN: [id number]  Admission Date: 11/4/2020  Hospital Length of Stay: 0 days  Code Status: Prior   Attending Provider: Abundio Curtis MD   Consulting Provider: Rohith Cifuentes MD  Primary Care Physician: Rubi Young DO  Principal Problem:<principal problem not specified>    Patient information was obtained from patient and ER records.     Consults  Subjective:     Chief Complaint:  Atrial fibrillation     HPI:   88 year old female here for Watchman, she has pAF, CVA, HFpEF, CKD III, fall risk, elevated bleeding risk (HAS BLED 5) and stroke risk (CHADS VASc 7). She reports feeling well overall, she denies angina but has chronic orthopnea and peripheral edema. She denies PND. She has been compliant with eliquis and has been taking it twice daily. She has no contraindications to SONYA.    SONYA 8/5/20    Conclusion       · Severe right atrial enlargement.  · Severe left atrial enlargement.  · Normal left ventricular systolic function. The estimated ejection fraction is 55%.  · Normal right ventricular systolic function.  · SEC and Thrombus is present in the appendage with echo contrast  · Moderate to severe tricuspid regurgitation.  · Moderate mitral regurgitation.  · Normal LV diastolic function.     45 degree 2.2 cm width and length of 2.7 cm  90 degree 2.1 cm width and length of 3.1cm  120 degree 2.1 cm width and length of 2.1 cm  0 degree 2.2 cm width and length of 3.6 cm      TTE 5/19/17    CONCLUSIONS     1 - Normal left ventricular systolic function (EF 55-60%).     2 - Normal right ventricular systolic function .     3 - Indeterminate LV diastolic function.     4 - The estimated PA systolic pressure is 36 mmHg.     5 - Trivial to mild mitral regurgitation.     6 - Mild tricuspid regurgitation.     7 - Trivial pulmonic regurgitation.     8 - Intermediate central venous pressure.     Dysphagia or  odynophagia:  No  Liver Disease, esophageal disease, or known varices:  No  Upper GI Bleeding: No  Snoring:  Yes  Sleep Apnea:  Unknown  Prior neck surgery or radiation:  No  History of anesthetic difficulties:  No  Family history of anesthetic difficulties:  No  Last oral intake:  12 hours ago  Able to move neck in all directions:  Yes          Past Medical History:   Diagnosis Date    Amblyopia of left eye 4/10/2013    Anticoagulant long-term use     Coumadin - A-Fib    Anxiety     Arthritis     Facet arthropathy, Lumbosacral    Atherosclerosis of aorta     Atrial fibrillation     Auricular fibrillation     Central retinal vein occlusion of left eye     CHF (congestive heart failure)     Chronic kidney disease, stage 3     Coronary artery disease     Depression     Diastolic heart failure 2014    Exotropia of both eyes 2013    recession RSR 5.0mm w/ adj; recession LR os 5.0 w/ adj; resect MR os  4.0mm    Hearing loss     History of resection of small bowel     Hyperlipidemia     Hypertension     Hypertensive retinopathy of both eyes     Hypoglycemia     Macular degeneration     Meniere's disease of both ears     OA (osteoarthritis) of shoulder     Right    Osteoporosis     Other and unspecified hyperlipidemia     Posterior vitreous detachment of both eyes     Rhinitis     TIA (transient ischemic attack)        Past Surgical History:   Procedure Laterality Date    APPENDECTOMY      CARDIAC CATHETERIZATION      CATARACT EXTRACTION W/  INTRAOCULAR LENS IMPLANT Bilateral      SECTION, CLASSIC      HYSTERECTOMY      INNER EAR SURGERY      JOINT REPLACEMENT      LEFT KNEE REPLACEMENT IN  -    OOPHORECTOMY      SINUS SURGERY      STRABISMUS SURGERY  13    RSR recession 5 mm, LLR recession 5 mm and LMR resection 4mm    STRABISMUS SURGERY  2014    recess LR OD 6mm    TONSILLECTOMY         Review of patient's allergies indicates:   Allergen Reactions     "Bactrim [sulfamethoxazole-trimethoprim] Other (See Comments)     "Couldn't walk"    Opioids - morphine analogues Other (See Comments)     Bowel issues; bowel obstruction    Tizanidine Other (See Comments)     "Lips were numb,  Almost passed out."    Tramadol Hallucinations    Beta-blockers (beta-adrenergic blocking agts) Other (See Comments)     Can not go on beta blockers for long period of time - due to taking allergy injections    Phenergan [promethazine] Other (See Comments)     Per pt. request- saw sisters have bad reaction to drug       No current facility-administered medications on file prior to encounter.      Current Outpatient Medications on File Prior to Encounter   Medication Sig    apixaban (ELIQUIS) 5 mg Tab Take 1 tablet (5 mg total) by mouth 2 (two) times daily.    aspirin (ECOTRIN) 81 MG EC tablet Take 1 tablet (81 mg total) by mouth once daily.    furosemide (LASIX) 20 MG tablet TAKE 1 TABLET BY MOUTH DAILY; MAY TAKE AN ADDITIONAL TABLET AS NEEDED FOR SWELLING.    potassium chloride (MICRO-K) 10 MEQ CpSR TAKE 1 CAPSULE EVERY DAY    cholecalciferol, vitamin D3, (VITAMIN D3 ORAL) Take 1 tablet by mouth once daily.    diclofenac sodium (VOLTAREN) 1 % Gel Apply 2 g topically 3 (three) times daily. Apply to ankle for pain    multivitamin (ONE DAILY MULTIVITAMIN) per tablet Take 1 tablet by mouth once daily.    mupirocin (BACTROBAN) 2 % ointment Apply to affected area 3 times daily    peg 400-propylene glycol (SYSTANE) 0.4-0.3 % Drop Place 1-2 drops into both eyes as needed.     triamcinolone (NASACORT) 55 mcg nasal inhaler 2 sprays by Nasal route once daily.     Family History     Problem Relation (Age of Onset)    Breast cancer Maternal Aunt    Diabetes Sister, Brother    Heart disease Sister, Sister    Hypertension Mother, Father    Liver disease Sister    No Known Problems Maternal Uncle, Paternal Aunt, Paternal Uncle, Maternal Grandmother, Maternal Grandfather, Paternal Grandmother, " Paternal Grandfather, Daughter, Son, Son, Son        Tobacco Use    Smoking status: Former Smoker     Types: Cigarettes     Quit date: 10/29/1982     Years since quittin.0    Smokeless tobacco: Never Used   Substance and Sexual Activity    Alcohol use: Not Currently     Alcohol/week: 0.0 standard drinks     Comment: socially    Drug use: No    Sexual activity: Never     Review of Systems   All other systems reviewed and are negative.    Objective:     Vital Signs (Most Recent):  Temp: 97.3 °F (36.3 °C) (20)  Pulse: 70 (20)  Resp: 18 (20)  BP: (!) 143/65 (20)  SpO2: 100 % (20) Vital Signs (24h Range):  Temp:  [97.3 °F (36.3 °C)] 97.3 °F (36.3 °C)  Pulse:  [70] 70  Resp:  [18] 18  SpO2:  [100 %] 100 %  BP: (143-159)/(65-69) 143/65     Weight: 74.4 kg (164 lb)  Body mass index is 26.88 kg/m².    SpO2: 100 %  O2 Device (Oxygen Therapy): room air    No intake or output data in the 24 hours ending 20 0713    Lines/Drains/Airways     Epidural Line                 Perineural Analgesia/Anesthesia Assessment (using dermatomes) Epidural -- days         Perineural Analgesia/Anesthesia Assessment (using dermatomes) Epidural -- days          Peripheral Intravenous Line                 Peripheral IV - Single Lumen 20 1434 20 G Left Antecubital 131 days         Peripheral IV - Single Lumen 20 0643 20 G Left Antecubital less than 1 day                Physical Exam   Constitutional: She is oriented to person, place, and time. She appears well-developed and well-nourished. No distress.   HENT:   Head: Normocephalic and atraumatic.   Eyes: Pupils are equal, round, and reactive to light. EOM are normal.   Neck: Normal range of motion. JVD present.   Cardiovascular: Normal rate, normal heart sounds and intact distal pulses. Exam reveals no gallop and no friction rub.   No murmur heard.  Irregularly irregular rhythm   Pulmonary/Chest: Effort normal and  breath sounds normal. No respiratory distress. She has no wheezes. She has no rales.   Abdominal: Soft. Bowel sounds are normal. She exhibits no distension. There is no abdominal tenderness.   Musculoskeletal: Normal range of motion.         General: Edema (3+ bilateral lower extremity edema) present.   Neurological: She is alert and oriented to person, place, and time.   Skin: Skin is warm. No rash noted. She is not diaphoretic.   Psychiatric: She has a normal mood and affect. Her behavior is normal.       Significant Labs:     CBC auto differential  Order: [id number]  Status:  Final result   Visible to patient:  Yes (Patient Portal) Next appt:  Today at 08:00 AM in Cardiology (INPATIENT, SONYA) Dx:  Paroxysmal atrial fibrillation   Ref Range & Units 4wk ago  (10/6/20) 3mo ago  (8/5/20) 4mo ago  (6/26/20) 4mo ago  (6/25/20) 5mo ago  (5/11/20) 10mo ago  (1/1/20) 1yr ago  (10/3/19)   WBC 3.90 - 12.70 K/uL 3.37Low   2.98Low   2.69Low   3.79Low   3.25Low   5.95  4.79    RBC 4.00 - 5.40 M/uL 3.39Low   3.45Low   3.57Low   3.91Low   3.88Low   3.87Low   4.19    Hemoglobin 12.0 - 16.0 g/dL 10.4Low   10.6Low   10.7Low   11.9Low   11.6Low   11.6Low   12.5    Hematocrit 37.0 - 48.5 % 31.8Low   31.9Low   32.5Low   36.0Low   36.8Low   34.7Low   38.8    MCV 82 - 98 fL 94  93  91  92  95  90  93    MCH 27.0 - 31.0 pg 30.7  30.7  30.0  30.4  29.9  30.0  29.8    MCHC 32.0 - 36.0 g/dL 32.7  33.2  32.9  33.1  31.5Low   33.4  32.2    RDW 11.5 - 14.5 % 14.8High   15.4High   14.7High   15.0High   15.0High   15.7High   15.0High     Platelets 150 - 350 K/uL 173  165  152  171  196  208  232    MPV 9.2 - 12.9 fL 10.4  10.5  10.2  9.8  11.1  10.5  10.9    Immature Granulocytes 0.0 - 0.5 % 0.6High    0.4  0.3   0.2  0.2    Gran # (ANC) 1.8 - 7.7 K/uL 2.2   1.3Low   2.5   4.6  3.3    Immature Grans (Abs) 0.00 - 0.04 K/uL 0.02   0.01 CM  0.01 CM   0.01 CM  0.01 CM    Comment: Mild elevation in immature granulocytes is non specific and   can be  seen in a variety of conditions including stress response,   acute inflammation, trauma and pregnancy. Correlation with other   laboratory and clinical findings is essential.    Lymph # 1.0 - 4.8 K/uL 0.8Low    1.0  1.0   0.7Low   1.0    Mono # 0.3 - 1.0 K/uL 0.3   0.3  0.3   0.5  0.4    Eos # 0.0 - 0.5 K/uL 0.0   0.1  0.0   0.1  0.1    Baso # 0.00 - 0.20 K/uL 0.03   0.02  0.02   0.02  0.03    nRBC 0 /100 WBC 0   0  0   0  0    Gran % 38.0 - 73.0 % 65.9   48.8  65.1   76.7High   69.1    Lymph % 18.0 - 48.0 % 22.8   36.4  25.1   12.4Low   20.7    Mono % 4.0 - 15.0 % 8.6   11.5  8.2   9.1  7.9    Eosinophil % 0.0 - 8.0 % 1.2   2.2  0.8   1.3  1.5    Basophil % 0.0 - 1.9 % 0.9   0.7  0.5   0.3  0.6    Differential Method  Automated   Automated                    Significant Imaging:     I have reviewed all pertinent imaging studies      Assessment and Plan:     Longstanding persistent atrial fibrillation  1. SONYA for evaluation of Watchman  -No absolute contraindications of esophageal stricture, tumor, perforation, laceration,or diverticulum and/or active GI bleed  -The risks, benefits & alternatives of the procedure were explained to the patient.   -The risks of transesophageal echo include but are not limited to:  Dental trauma, esophageal trauma/perforation, bleeding, laryngospasm/brochospasm, aspiration, sore throat/hoarseness, & dislodgement of the endotracheal tube/nasogastric tube (where applicable).    -The risks of moderate sedation include hypotension, respiratory depression, arrhythmias, bronchospasm, & death.    -Informed consent was obtained. The patient is agreeable to proceed with the procedure and all questions and concerns addressed.    Case discussed with an attending in echocardiography lab.     Further recommendations per attending addendum          VTE Risk Mitigation (From admission, onward)    None            Rohith Cifuentes MD  Cardiology   [facility] Stay Cardiac Unit

## 2020-11-04 NOTE — HPI
88 year old female here for Watchman, she has pAF, CVA, HFpEF, CKD III, fall risk, elevated bleeding risk (HAS BLED 5) and stroke risk (CHADS VASc 7). She reports feeling well overall, she denies angina but has chronic orthopnea and peripheral edema. She denies PND. She has been compliant with eliquis and has been taking it twice daily. She has no contraindications to SONYA.    SONYA 8/5/20    Conclusion       · Severe right atrial enlargement.  · Severe left atrial enlargement.  · Normal left ventricular systolic function. The estimated ejection fraction is 55%.  · Normal right ventricular systolic function.  · SEC and Thrombus is present in the appendage with echo contrast  · Moderate to severe tricuspid regurgitation.  · Moderate mitral regurgitation.  · Normal LV diastolic function.     45 degree 2.2 cm width and length of 2.7 cm  90 degree 2.1 cm width and length of 3.1cm  120 degree 2.1 cm width and length of 2.1 cm  0 degree 2.2 cm width and length of 3.6 cm      TTE 5/19/17    CONCLUSIONS     1 - Normal left ventricular systolic function (EF 55-60%).     2 - Normal right ventricular systolic function .     3 - Indeterminate LV diastolic function.     4 - The estimated PA systolic pressure is 36 mmHg.     5 - Trivial to mild mitral regurgitation.     6 - Mild tricuspid regurgitation.     7 - Trivial pulmonic regurgitation.     8 - Intermediate central venous pressure.     Dysphagia or odynophagia:  No  Liver Disease, esophageal disease, or known varices:  No  Upper GI Bleeding: No  Snoring:  Yes  Sleep Apnea:  Unknown  Prior neck surgery or radiation:  No  History of anesthetic difficulties:  No  Family history of anesthetic difficulties:  No  Last oral intake:  12 hours ago  Able to move neck in all directions:  Yes

## 2020-11-04 NOTE — INTERVAL H&P NOTE
The patient has been examined and the H&P has been reviewed:    I concur with the findings and no changes have occurred since H&P was written.    Anesthesia/Surgery risks, benefits and alternative options discussed and understood by patient/family.    Referral: Dr. Saenz / Martha Smith  This is Ms. Jimenez who is a 88 yoF, here for Watchman implantation. Patient with history of Atrial fibrillation with high HASBLED / CHADSVASC. Patient is compliant with her medications, last Eliquis and ASA dose last night, uninterrupted for 30 days. Prior SONYA with EF 55%, with SEC and Thrombus in the FERNANDA, with Moderate TR / MR. Patient wishes to stay overnight on observation as she lives alone.     CTA reviewed. FERNANDA Average diameter 30.3mm (Distance 30.7mm x 26.5mm), Area of 6.28cm, length 35.9mm    Chronic atrial fibrillation  Severe chronic atrial fibrillation- Chronic AFib since 2016, on Eliquis / ASA   - Labile INR's recently, balance issues  - CHADS-VASc = 7, HASBLED = 5  - Plan for LAAO (Watchman), will obtain SONYA at the time of the procedure  - Anti-platelet Therapy: ASA 81 mg Daily, on warfarin  - Access: R CFV  - Creatinine/CrCl: 0.8 on 6/26/2020  - Allergies: No shellfish/Iodine allergy  - Pre-Hydration: 3 cc/kg/hr IV, continuous, for 1 hour, Pre-Procedure  - Pre-Op Med: Diphenhydramine (Benadryl) 50 mg, Oral, Once, Pre-Procedure   - All patient's questions were answered    SONYA  · Severe right atrial enlargement.  · Severe left atrial enlargement.  · Normal left ventricular systolic function. The estimated ejection fraction is 55%.  · Normal right ventricular systolic function.  · SEC and Thrombus is present in the appendage with echo cotnrast  · Moderate to severe tricuspid regurgitation.  · Moderate mitral regurgitation.  · Normal LV diastolic function.     45 degree 2.2 cm width and length of 2.7 cm  90 degree 2.1 cm width and length of 3.1cm  120 degree 2.1 cm width and length of 2.1 cm  0 degree 2.2 cm width and  length of 3.6 cm         Active Hospital Problems    Diagnosis  POA    Longstanding persistent atrial fibrillation [I48.11]  Yes      Resolved Hospital Problems   No resolved problems to display.

## 2020-11-04 NOTE — ANESTHESIA POSTPROCEDURE EVALUATION
Anesthesia Post Evaluation    Patient: Jenniffer Jimenez    Procedure(s) Performed: Procedure(s) (LRB):  Left atrial appendage closure device (N/A)    Final Anesthesia Type: general    Patient location during evaluation: PACU  Patient participation: Yes- Able to Participate  Level of consciousness: awake and alert, awake and oriented  Post-procedure vital signs: reviewed and stable  Pain management: adequate  Airway patency: patent    PONV status at discharge: No PONV  Anesthetic complications: no      Cardiovascular status: blood pressure returned to baseline, hemodynamically stable and stable  Respiratory status: unassisted, spontaneous ventilation and room air  Hydration status: euvolemic  Follow-up not needed.          Vitals Value Taken Time   /76 11/04/20 1230   Temp 36.1 °C (97 °F) 11/04/20 1130   Pulse 62 11/04/20 1230   Resp 18 11/04/20 1130   SpO2 100 % 11/04/20 1130         Event Time   Out of Recovery 11:28:00         Pain/Hammad Score: Hammad Score: 9 (11/4/2020  9:45 AM)

## 2020-11-04 NOTE — Clinical Note
The sheath is inserted through the larger sheath in the right femoral vein.  And repositioned to the Right Atrium.

## 2020-11-04 NOTE — PROGRESS NOTES
Patient admitted to recovery see [facility] for complete assessment pacu bcg's maintained safety measures verified patient sedated with oral airway intact. Patient has pressure dressing to right groin intact rn at bedside.

## 2020-11-04 NOTE — PLAN OF CARE
Pt free of falls/trauma/injuries. AAOx4 VSS Denies c/o SOB; O2Sats remain stable on room air. SATs remain above 90%. Watchman placed today 11/4/2020. Plan to continue with post-op care.  Pt tolerating plan of care. Will continue to monitor.

## 2020-11-04 NOTE — PROGRESS NOTES
"You have been scheduled for a procedure in the echo lab on Tuesday, December 15,  2020.     Please report to the Cardiology Waiting Area on the Third floor of the hospital and check in at 9:30AM.     You will then be taken to the SSCU (Short Stay Cardiac Unit) and prepared for your procedure. Please be aware that this is not the time of your procedure but the time you are to arrive. The procedures are scheduled on an hourly basis; however, emergency cases take precedence over all other cases.     You may not have anything to eat or drink after midnight the night before your test. You may take your regular morning medications with water.    The procedure will take 1-2 hours to perform. After the procedure, you will return to SSCU on the third floor of the hospital. Your family may remain in the room with you during this time.     You will be discharged home that same afternoon. You must have someone to drive you home.  Your doctor will determine, based on your progress, the time of your discharge. The results of your procedure will be discussed with you before you are discharged. Any further testing or procedures will be scheduled for you either before you leave or you will be called with these appointments.       If you should have any questions, concerns, or need to change the date of your procedure, please call EDITH Monteiro @ [phone]",    Special Instructions:  Covid test 12/14        THE ABOVE INSTRUCTIONS WERE GIVEN TO THE PATIENT VERBALLY AND THEY VERBALIZED UNDERSTANDING.  THEY DO NOT REQUIRE ANY SPECIAL NEEDS AND DO NOT HAVE ANY LEARNING BARRIERS.          Directions for Reporting to Cardiology Waiting Area in the Hospital  If you park in the Parking Garage:  Take elevators to the1st floor of the parking garage.  Continue past the gift shop, coffee shop, and piano.  Take a right and go to the gold elevators. (Elevator B)  Take the elevator to the 3rd floor.  Follow the arrow on the sign on the wall that " says Cath Lab Registration/EP Lab Registration.  Follow the long hallway all the way around until you come to a big open area.  This is the registration area.  Check in at Reception Desk.    OR    If family is dropping you off:  Have them drop you off at the front of the Hospital under the green overhang.  Enter through the doors and take a right.  Take the E elevators to the 3rd floor Cardiology Waiting Area.  Check in at the Reception Desk in the waiting room.

## 2020-11-04 NOTE — Clinical Note
115 ml injected throughout the case. 85 mL total wasted during the case. 200 mL total used in the case.

## 2020-11-04 NOTE — PROGRESS NOTES
Pt arrived to unit via stretcher with escort, VSS, ambulated to bed, bed in lowest position and call light in reach. Will continue to monitor.

## 2020-11-04 NOTE — NURSING TRANSFER
Nursing Transfer Note      11/4/2020     Transfer To: sscu 12    Transfer via stretcher    Transfer with cardiac monitoring    Transported by dylan miranda    Medicines sent: none    Chart send with patient: Yes    Notified: reported to gaudencio miranda    Patient reassessed at: see epic (date, time)    Upon arrival to floor: to room no complaints no distress noted.

## 2020-11-04 NOTE — TRANSFER OF CARE
"Anesthesia Transfer of Care Note    Patient: Jenniffer Jimenez    Procedure(s) Performed: Procedure(s) (LRB):  Left atrial appendage closure device (N/A)    Patient location: PACU    Anesthesia Type: general    Transport from OR: Transported from OR on 6-10 L/min O2 by face mask with adequate spontaneous ventilation    Post pain: adequate analgesia    Post assessment: no apparent anesthetic complications    Post vital signs: stable    Level of consciousness: awake    Nausea/Vomiting: no nausea/vomiting    Complications: none    Transfer of care protocol was followed      Last vitals:   Visit Vitals  BP (!) 143/65 (BP Location: Right arm, Patient Position: Lying)   Pulse 70   Temp 36.3 °C (97.3 °F) (Oral)   Resp 18   Ht 5' 5.5" (1.664 m)   Wt 74.4 kg (164 lb)   LMP  (LMP Unknown)   SpO2 100%   Breastfeeding No   BMI 26.88 kg/m²     "

## 2020-11-04 NOTE — PROCEDURES
Brief Operative Note:    : Abundio Curtis MD     Referring Physician: Abundio Curtis     All Operators: Surgeon(s):  MD Caesar Pulido MD Madhav Prakash Upadhyaya, MD Rikin Sanjay Kadakia, MD Stephen R. Ramee, MD     Preoperative Diagnosis: Longstanding persistent atrial fibrillation [I48.11]     Postop Diagnosis: Longstanding persistent atrial fibrillation [I48.11]    Treatments/Procedures: Procedure(s) (LRB):  Left atrial appendage closure device (N/A)    Finding. S/p 31mm Watchman device. See catheterization report for full details.    Estimated Blood loss: 20 cc    Specimens removed: No    Lizette Holley

## 2020-11-05 VITALS
WEIGHT: 164 LBS | HEART RATE: 73 BPM | HEIGHT: 66 IN | TEMPERATURE: 99 F | DIASTOLIC BLOOD PRESSURE: 52 MMHG | SYSTOLIC BLOOD PRESSURE: 102 MMHG | RESPIRATION RATE: 20 BRPM | BODY MASS INDEX: 26.36 KG/M2 | OXYGEN SATURATION: 97 %

## 2020-11-05 LAB
BASOPHILS # BLD AUTO: 0.04 K/UL (ref 0–0.2)
BASOPHILS NFR BLD: 0.9 % (ref 0–1.9)
DIFFERENTIAL METHOD: ABNORMAL
EOSINOPHIL # BLD AUTO: 0 K/UL (ref 0–0.5)
EOSINOPHIL NFR BLD: 0.9 % (ref 0–8)
ERYTHROCYTE [DISTWIDTH] IN BLOOD BY AUTOMATED COUNT: 14.8 % (ref 11.5–14.5)
HCT VFR BLD AUTO: 32.5 % (ref 37–48.5)
HGB BLD-MCNC: 10.3 G/DL (ref 12–16)
IMM GRANULOCYTES # BLD AUTO: 0.01 K/UL (ref 0–0.04)
IMM GRANULOCYTES NFR BLD AUTO: 0.2 % (ref 0–0.5)
LYMPHOCYTES # BLD AUTO: 1 K/UL (ref 1–4.8)
LYMPHOCYTES NFR BLD: 22.5 % (ref 18–48)
MCH RBC QN AUTO: 30.2 PG (ref 27–31)
MCHC RBC AUTO-ENTMCNC: 31.7 G/DL (ref 32–36)
MCV RBC AUTO: 95 FL (ref 82–98)
MONOCYTES # BLD AUTO: 0.5 K/UL (ref 0.3–1)
MONOCYTES NFR BLD: 10.3 % (ref 4–15)
NEUTROPHILS # BLD AUTO: 2.9 K/UL (ref 1.8–7.7)
NEUTROPHILS NFR BLD: 65.2 % (ref 38–73)
NRBC BLD-RTO: 0 /100 WBC
PLATELET # BLD AUTO: 154 K/UL (ref 150–350)
PMV BLD AUTO: 10.1 FL (ref 9.2–12.9)
POC ACTIVATED CLOTTING TIME K: 213 SEC (ref 74–137)
POC ACTIVATED CLOTTING TIME K: 241 SEC (ref 74–137)
POC ACTIVATED CLOTTING TIME K: 290 SEC (ref 74–137)
RBC # BLD AUTO: 3.41 M/UL (ref 4–5.4)
SAMPLE: ABNORMAL
WBC # BLD AUTO: 4.48 K/UL (ref 3.9–12.7)

## 2020-11-05 PROCEDURE — 36415 COLL VENOUS BLD VENIPUNCTURE: CPT | Mod: HCNC

## 2020-11-05 PROCEDURE — 90472 IMMUNIZATION ADMIN EACH ADD: CPT | Mod: HCNC | Performed by: INTERNAL MEDICINE

## 2020-11-05 PROCEDURE — G0008 ADMIN INFLUENZA VIRUS VAC: HCPCS | Mod: HCNC | Performed by: INTERNAL MEDICINE

## 2020-11-05 PROCEDURE — 90471 IMMUNIZATION ADMIN: CPT | Mod: HCNC | Performed by: INTERNAL MEDICINE

## 2020-11-05 PROCEDURE — 99900035 HC TECH TIME PER 15 MIN (STAT): Mod: HCNC

## 2020-11-05 PROCEDURE — 90694 VACC AIIV4 NO PRSRV 0.5ML IM: CPT | Mod: HCNC | Performed by: INTERNAL MEDICINE

## 2020-11-05 PROCEDURE — 94761 N-INVAS EAR/PLS OXIMETRY MLT: CPT | Mod: HCNC

## 2020-11-05 PROCEDURE — 85025 COMPLETE CBC W/AUTO DIFF WBC: CPT | Mod: HCNC

## 2020-11-05 PROCEDURE — 63600175 PHARM REV CODE 636 W HCPCS: Mod: HCNC | Performed by: INTERNAL MEDICINE

## 2020-11-05 PROCEDURE — 25000003 PHARM REV CODE 250: Mod: HCNC | Performed by: STUDENT IN AN ORGANIZED HEALTH CARE EDUCATION/TRAINING PROGRAM

## 2020-11-05 RX ADMIN — ASPIRIN 81 MG: 81 TABLET, COATED ORAL at 08:11

## 2020-11-05 RX ADMIN — A/SINGAPORE/GP1908/2015 IVR-180 (AN A/MICHIGAN/45/2015 (H1N1)PDM09-LIKE VIRUS, A/HONG KONG/4801/2014, NYMC X-263B (H3N2) (AN A/HONG KONG/4801/2014-LIKE VIRUS), AND B/BRISBANE/60/2008, WILD TYPE (A B/BRISBANE/60/2008-LIKE VIRUS) 0.5 ML: 15; 15; 15 INJECTION, SUSPENSION INTRAMUSCULAR at 03:11

## 2020-11-05 RX ADMIN — FUROSEMIDE 20 MG: 20 TABLET ORAL at 08:11

## 2020-11-05 RX ADMIN — ACETAMINOPHEN 650 MG: 325 TABLET ORAL at 11:11

## 2020-11-05 RX ADMIN — DOCUSATE SODIUM 100 MG: 100 CAPSULE, LIQUID FILLED ORAL at 08:11

## 2020-11-05 RX ADMIN — APIXABAN 5 MG: 5 TABLET, FILM COATED ORAL at 08:11

## 2020-11-05 RX ADMIN — PRAVASTATIN SODIUM 40 MG: 40 TABLET ORAL at 08:11

## 2020-11-05 NOTE — PLAN OF CARE
"[facility]    HOME HEALTH ORDERS  FACE TO FACE ENCOUNTER    Patient Name: Jenniffer Jimenez  YOB: 1932    PCP: Rubi Young DO   PCP Address: [address] [facility] / [place] [address]  PCP Phone Number: [phone]  PCP Fax: [phone]    Encounter Date: 11/05/2020    Admit to Home Health    Diagnoses:  Active Hospital Problems    Diagnosis  POA    *Longstanding persistent atrial fibrillation [I48.11]  Yes      Resolved Hospital Problems   No resolved problems to display.       Future Appointments   Date Time Provider Department Center   12/8/2020  7:45 AM LAB, [place] [facility] [place]   12/15/2020  9:20 AM Mary Smith NP [facility] CARDIO [place]   12/21/2020  9:00 AM COVID TESTING, [facility] GEN SURG [facility] GENSUR [facility]   12/22/2020  8:30 AM Ashia Morocho PA-C [facility] CARDVAL [facility]   12/22/2020  9:00 AM 3PREP, [facility] access hospital [facility]   12/22/2020 10:00 AM INPATIENT, SONYA [facility] ECHOSTR [facility]   12/22/2020  1:40 PM MARK Hernandez MD [facility] OPHTHAL [facility]   1/28/2021  2:00 PM Lennie Louis DPM [facility] POD [facility]           I have seen and examined this patient face to face today. My clinical findings that support the need for the home health skilled services and home bound status are the following:  Weakness/numbness causing balance and gait disturbance due to Surgery making it taxing to leave home.  Requiring assistive device to leave home due to unsteady gait caused by  Surgery.  Patient with medication mismanagement issues requiring home bound status as evidenced by  Poor understanding of medication regimen/dosage.  Medical restrictions requiring assistance of another human to leave home due to  Post surgery monitoring.    Allergies:  Review of patient's allergies indicates:   Allergen Reactions    Bactrim [sulfamethoxazole-trimethoprim] Other (See Comments)     "Couldn't walk"    Opioids - morphine analogues Other (See Comments)     " "Bowel issues; bowel obstruction    Tizanidine Other (See Comments)     "Lips were numb,  Almost passed out."    Tramadol Hallucinations    Beta-blockers (beta-adrenergic blocking agts) Other (See Comments)     Can not go on beta blockers for long period of time - due to taking allergy injections    Phenergan [promethazine] Other (See Comments)     Per pt. request- saw sisters have bad reaction to drug       Diet: cardiac diet    Activities: ambulate in house with assistance    Nursing:   SN to complete comprehensive assessment including routine vital signs. Instruct on disease process and s/s of complications to report to MD. Review/verify medication list sent home with the patient at time of discharge  and instruct patient/caregiver as needed. Frequency may be adjusted depending on start of care date.    Notify MD if SBP > 160 or < 90; DBP > 90 or < 50; HR > 120 or < 50; Temp > 101; Other:         CONSULTS:    Physical Therapy to evaluate and treat. Evaluate for home safety and equipment needs; Establish/upgrade home exercise program. Perform / instruct on therapeutic exercises, gait training, transfer training, and Range of Motion.  Occupational Therapy to evaluate and treat. Evaluate home environment for safety and equipment needs. Perform/Instruct on transfers, ADL training, ROM, and therapeutic exercises.   to evaluate for community resources/long-range planning.  Aide to provide assistance with personal care, ADLs, and vital signs.    MISCELLANEOUS CARE:  N/A    WOUND CARE ORDERS  n/a      Medications: Review discharge medications with patient and family and provide education.      Current Discharge Medication List      CONTINUE these medications which have NOT CHANGED    Details   apixaban (ELIQUIS) 5 mg Tab Take 1 tablet (5 mg total) by mouth 2 (two) times daily.  Qty: 60 tablet, Refills: 11      !! aspirin (ECOTRIN) 81 MG EC tablet Take 1 tablet (81 mg total) by mouth once daily.  Qty: 360 " tablet, Refills: 0    Associated Diagnoses: Chronic atrial fibrillation      furosemide (LASIX) 20 MG tablet TAKE 1 TABLET BY MOUTH DAILY; MAY TAKE AN ADDITIONAL TABLET AS NEEDED FOR SWELLING.  Qty: 100 tablet, Refills: 3      potassium chloride (MICRO-K) 10 MEQ CpSR TAKE 1 CAPSULE EVERY DAY  Qty: 90 capsule, Refills: 3      pravastatin (PRAVACHOL) 40 MG tablet Take 1 tablet (40 mg total) by mouth once daily.  Qty: 90 tablet, Refills: 3      !! aspirin (ECOTRIN) 81 MG EC tablet Take 81 mg by mouth once daily.      cholecalciferol, vitamin D3, (VITAMIN D3 ORAL) Take 1 tablet by mouth once daily.      diclofenac sodium (VOLTAREN) 1 % Gel Apply 2 g topically 3 (three) times daily. Apply to ankle for pain  Qty: 1 Tube, Refills: 0      docusate sodium (COLACE) 100 MG capsule Take 100 mg by mouth once daily.      fluticasone propionate (FLONASE) 50 mcg/actuation nasal spray USE 1 SPRAY IN EACH NOSTRIL ONE TIME DAILY  Qty: 32 g, Refills: 11      multivitamin (ONE DAILY MULTIVITAMIN) per tablet Take 1 tablet by mouth once daily.      mupirocin (BACTROBAN) 2 % ointment Apply to affected area 3 times daily  Qty: 22 g, Refills: 1      peg 400-propylene glycol (SYSTANE) 0.4-0.3 % Drop Place 1-2 drops into both eyes as needed.       triamcinolone (NASACORT) 55 mcg nasal inhaler 2 sprays by Nasal route once daily.  Qty: 17 g, Refills: 0      vitamin E 400 UNIT capsule Take 400 Units by mouth once daily.       !! - Potential duplicate medications found. Please discuss with provider.          I certify that this patient is confined to her home and needs intermittent skilled nursing care, physical therapy and occupational therapy.

## 2020-11-05 NOTE — PLAN OF CARE
11/05/20 1223   Post-Acute Status   Post-Acute Authorization Home Health   Home Health Status Set-up Complete     Pt accepted by [facility].    Sagrario Ashford LMSW  [facility]  [id number]

## 2020-11-05 NOTE — PLAN OF CARE
Pt discharged per MD orders.  Tele discontinued and returned to station.  IV discontinued; catheter tip intact x.  Medication list and prescriptions reviewed; prescriptions sent to pt preferred pharmacy and printed prescriptions provided.  Pt verbalizes understanding of all written and verbal discharge instructions. [facility] is set up for pt. Pt awaiting family/escort arrival.  Will continue to monitor.

## 2020-11-05 NOTE — PLAN OF CARE
No significant events occurred during the night. Pt remained free from falls/trauma/injury. VSS. Denies any CP, SOB, palpitations, dizziness, pain and discomfort. R groin CDI, dressed w/ gauze and tegaderm, no sign of bleeding or hematoma. Turning/repositioning independently in bed. Plan of care reviewed with patient and all questions answered, verbalizes understanding. No acute distress noted. Will continue to monitor.

## 2020-11-05 NOTE — PLAN OF CARE
11/05/20 1054   Discharge Assessment   Assessment Type Discharge Planning Assessment   Confirmed/corrected address and phone number on facesheet? Yes   Assessment information obtained from? Patient;Medical Record   Expected Length of Stay (days) 2   Communicated expected length of stay with patient/caregiver yes   Prior to hospitilization cognitive status: Alert/Oriented   Prior to hospitalization functional status: Independent;Assistive Equipment   Current cognitive status: Alert/Oriented   Current Functional Status: Assistive Equipment;Needs Assistance   Lives With alone   Able to Return to Prior Arrangements other (see comments)  (2 near falls today)   Is patient able to care for self after discharge? Unable to determine at this time (comments)   Patient's perception of discharge disposition skilled nursing facility   Readmission Within the Last 30 Days no previous admission in last 30 days   Patient currently being followed by outpatient case management? No   Patient currently receives any other outside agency services? No   Equipment Currently Used at Home cane, straight;cane, quad;rollator;walker, rolling;grab bar;bath bench   Do you have any problems affording any of your prescribed medications? TBD   Is the patient taking medications as prescribed? yes   Does the patient have transportation home? Yes   Transportation Anticipated family or friend will provide   Discharge Plan A Skilled Nursing Facility   Discharge Plan B Home;Home Health   DME Needed Upon Discharge  none   Admitted with A-Fib post Watchman procedure. Previously lived alone and was independent in her ADLs. Pt has had 2 near falls while here in the hospital. States she feels worse than pre procedure. Pt's nurse, Tanja, had called this  to express her concerns. Secure chat sent to Dr Holley and Dr Curtis.  had nurse ask the pt if she would be willing to go to SNF if recommended by PT-OT. She said she would.

## 2020-11-05 NOTE — PLAN OF CARE
CM was advised that the Interventional Cardiologists were at bedside. Discussion ongoing between Mds and the pt about DC options. Pt refusing to go to a SNF due to COVID. She is agreeable to go home with HHC. States she cannot afford home care aid. Mds expressed their opinion that she would be better off home with HHC than to remain here. Mds to write HHC orders. Pt states she has used [facility] in the past and would like to use them again.

## 2020-11-05 NOTE — DISCHARGE SUMMARY
[facility]  Interventional Cardiology  Discharge Summary      Patient Name: Jenniffer Jimenez  MRN: [id number]  Admission Date: 11/4/2020  Hospital Length of Stay: 1 days  Discharge Date and Time:  11/05/2020 9:00 AM  Attending Physician: Abundio Curtis MD  Discharging Provider: Lizette Holley MD  Primary Care Physician: Rubi Young DO    HPI:  No notes on file    Procedure(s) (LRB):  Left atrial appendage closure device (N/A)     Indwelling Lines/Drains at time of discharge:  Lines/Drains/Airways     None                 Hospital Course:  Ms. part is is a 88-year-old female with longstanding persistent atrial fibrillation, she underwent a right common femoral vein watchman implantation.  SONYA guidance use.  Status post 31 mm watchman device implanted.  Patient tolerated procedure well.  She was observed overnight patient was doing well hemodynamically stable.  She was ambulated and discharged in a stable medical condition.    -follow-up with Dr. Curtis in clinic  -will need a SONYA 45 days post Watchman  -if SONYA with satisfactory Watchman position and no device leak will discontinue Eliquis        Significant Diagnostic Studies: Labs:   CMP   Recent Labs   Lab 11/04/20  1459      K 4.0      CO2 25   GLU 98   BUN 20   CREATININE 1.0   CALCIUM 8.4*   ANIONGAP 11   ESTGFRAFRICA 58.1*   EGFRNONAA 50.4*   , CBC   Recent Labs   Lab 11/05/20  0016   WBC 4.48   HGB 10.3*   HCT 32.5*      , INR   Lab Results   Component Value Date    INR 1.0 10/06/2020    INR 1.3 (H) 08/05/2020    INR 2.4 (H) 07/30/2020    and Lipid Panel   Lab Results   Component Value Date    CHOL 206 (H) 06/25/2020    HDL 74 06/25/2020    LDLCALC 114.2 06/25/2020    TRIG 89 06/25/2020    CHOLHDL 35.9 06/25/2020       Pending Diagnostic Studies:     None        No new Assessment & Plan notes have been filed under this hospital service since the last note was generated.  Service: Interventional  Cardiology      Discharged Condition: good    Follow Up:    Patient Instructions:      Type & Screen   Standing Status: Future Standing Exp. Date: 10/11/21     Medications:  Reconciled Home Medications:      Medication List      CONTINUE taking these medications    * aspirin 81 MG EC tablet  Commonly known as: ECOTRIN  Take 81 mg by mouth once daily.     * aspirin 81 MG EC tablet  Commonly known as: ECOTRIN  Take 1 tablet (81 mg total) by mouth once daily.     diclofenac sodium 1 % Gel  Commonly known as: VOLTAREN  Apply 2 g topically 3 (three) times daily. Apply to ankle for pain     docusate sodium 100 MG capsule  Commonly known as: COLACE  Take 100 mg by mouth once daily.     ELIQUIS 5 mg Tab  Generic drug: apixaban  Take 1 tablet (5 mg total) by mouth 2 (two) times daily.     fluticasone propionate 50 mcg/actuation nasal spray  Commonly known as: FLONASE  USE 1 SPRAY IN EACH NOSTRIL ONE TIME DAILY     furosemide 20 MG tablet  Commonly known as: LASIX  TAKE 1 TABLET BY MOUTH DAILY; MAY TAKE AN ADDITIONAL TABLET AS NEEDED FOR SWELLING.     mupirocin 2 % ointment  Commonly known as: BACTROBAN  Apply to affected area 3 times daily     ONE DAILY MULTIVITAMIN per tablet  Generic drug: multivitamin  Take 1 tablet by mouth once daily.     potassium chloride 10 MEQ Cpsr  Commonly known as: MICRO-K  TAKE 1 CAPSULE EVERY DAY     pravastatin 40 MG tablet  Commonly known as: PRAVACHOL  Take 1 tablet (40 mg total) by mouth once daily.     SYSTANE (PROPYLENE GLYCOL) 0.4-0.3 % Drop  Generic drug: peg 400-propylene glycol  Place 1-2 drops into both eyes as needed.     triamcinolone 55 mcg nasal inhaler  Commonly known as: NASACORT  2 sprays by Nasal route once daily.     VITAMIN D3 ORAL  Take 1 tablet by mouth once daily.     vitamin E 400 UNIT capsule  Take 400 Units by mouth once daily.         * This list has 2 medication(s) that are the same as other medications prescribed for you. Read the directions carefully, and ask your  doctor or other care provider to review them with you.                Time spent on the discharge of patient: 55 minutes    Lizette Holley MD  Interventional Cardiology  [facility]

## 2020-11-06 NOTE — PLAN OF CARE
11/06/20 0705   Final Note   Assessment Type Final Discharge Note   Anticipated Discharge Disposition Home-Health   Hospital Follow Up  Appt(s) scheduled? Yes

## 2020-11-09 ENCOUNTER — TELEPHONE (OUTPATIENT)
Dept: OPHTHALMOLOGY | Facility: CLINIC | Age: 85
End: 2020-11-09

## 2020-11-09 NOTE — TELEPHONE ENCOUNTER
----- Message from Faby Fonseca sent at 11/9/2020  3:26 PM CST -----  Regarding: Speak with office  Contact: Jenniffer  Pt is calling to speak with someone in office about rescheduling appt in December. Pt have hear problem but let phone ring.      [phone]

## 2020-11-17 ENCOUNTER — NURSE TRIAGE (OUTPATIENT)
Dept: ADMINISTRATIVE | Facility: CLINIC | Age: 85
End: 2020-11-17

## 2020-11-18 ENCOUNTER — CLINICAL SUPPORT (OUTPATIENT)
Dept: INTERNAL MEDICINE | Facility: CLINIC | Age: 85
End: 2020-11-18
Payer: MEDICARE

## 2020-11-18 DIAGNOSIS — J30.9 CHRONIC ALLERGIC RHINITIS: ICD-10-CM

## 2020-11-18 PROCEDURE — 99499 NO LOS: ICD-10-PCS | Mod: HCNC,S$GLB,, | Performed by: ALLERGY & IMMUNOLOGY

## 2020-11-18 PROCEDURE — 95115 IMMUNOTHERAPY ONE INJECTION: CPT | Mod: HCNC,S$GLB,, | Performed by: FAMILY MEDICINE

## 2020-11-18 PROCEDURE — 95115 PR IMMUNOTHERAPY, ONE INJECTION: ICD-10-PCS | Mod: HCNC,S$GLB,, | Performed by: FAMILY MEDICINE

## 2020-11-18 PROCEDURE — 99499 UNLISTED E&M SERVICE: CPT | Mod: HCNC,S$GLB,, | Performed by: ALLERGY & IMMUNOLOGY

## 2020-11-20 ENCOUNTER — TELEPHONE (OUTPATIENT)
Dept: INTERNAL MEDICINE | Facility: CLINIC | Age: 85
End: 2020-11-20

## 2020-11-20 DIAGNOSIS — I10 HYPERTENSION, UNSPECIFIED TYPE: Primary | ICD-10-CM

## 2020-11-24 ENCOUNTER — DOCUMENT SCAN (OUTPATIENT)
Dept: HOME HEALTH SERVICES | Facility: HOSPITAL | Age: 85
End: 2020-11-24
Payer: MEDICARE

## 2020-11-27 ENCOUNTER — EXTERNAL HOME HEALTH (OUTPATIENT)
Dept: HOME HEALTH SERVICES | Facility: HOSPITAL | Age: 85
End: 2020-11-27
Payer: MEDICARE

## 2020-12-08 ENCOUNTER — LAB VISIT (OUTPATIENT)
Dept: LAB | Facility: HOSPITAL | Age: 85
End: 2020-12-08
Attending: NURSE PRACTITIONER
Payer: MEDICARE

## 2020-12-08 DIAGNOSIS — E78.00 PURE HYPERCHOLESTEROLEMIA: ICD-10-CM

## 2020-12-08 DIAGNOSIS — Z79.01 CURRENT USE OF LONG TERM ANTICOAGULATION: ICD-10-CM

## 2020-12-08 DIAGNOSIS — I48.20 CHRONIC ATRIAL FIBRILLATION: ICD-10-CM

## 2020-12-08 LAB
ALBUMIN SERPL BCP-MCNC: 3.7 G/DL (ref 3.5–5.2)
ALP SERPL-CCNC: 121 U/L (ref 55–135)
ALT SERPL W/O P-5'-P-CCNC: 11 U/L (ref 10–44)
ANION GAP SERPL CALC-SCNC: 10 MMOL/L (ref 8–16)
AST SERPL-CCNC: 16 U/L (ref 10–40)
BILIRUB SERPL-MCNC: 0.6 MG/DL (ref 0.1–1)
BUN SERPL-MCNC: 19 MG/DL (ref 8–23)
CALCIUM SERPL-MCNC: 8.8 MG/DL (ref 8.7–10.5)
CHLORIDE SERPL-SCNC: 100 MMOL/L (ref 95–110)
CHOLEST SERPL-MCNC: 164 MG/DL (ref 120–199)
CHOLEST/HDLC SERPL: 2.6 {RATIO} (ref 2–5)
CO2 SERPL-SCNC: 29 MMOL/L (ref 23–29)
CREAT SERPL-MCNC: 0.9 MG/DL (ref 0.5–1.4)
ERYTHROCYTE [DISTWIDTH] IN BLOOD BY AUTOMATED COUNT: 14.6 % (ref 11.5–14.5)
EST. GFR  (AFRICAN AMERICAN): >60 ML/MIN/1.73 M^2
EST. GFR  (NON AFRICAN AMERICAN): 57.3 ML/MIN/1.73 M^2
GLUCOSE SERPL-MCNC: 98 MG/DL (ref 70–110)
HCT VFR BLD AUTO: 31.7 % (ref 37–48.5)
HDLC SERPL-MCNC: 64 MG/DL (ref 40–75)
HDLC SERPL: 39 % (ref 20–50)
HGB BLD-MCNC: 10.1 G/DL (ref 12–16)
LDLC SERPL CALC-MCNC: 86.6 MG/DL (ref 63–159)
MAGNESIUM SERPL-MCNC: 2.2 MG/DL (ref 1.6–2.6)
MCH RBC QN AUTO: 30 PG (ref 27–31)
MCHC RBC AUTO-ENTMCNC: 31.9 G/DL (ref 32–36)
MCV RBC AUTO: 94 FL (ref 82–98)
NONHDLC SERPL-MCNC: 100 MG/DL
PLATELET # BLD AUTO: 163 K/UL (ref 150–350)
PMV BLD AUTO: 11.2 FL (ref 9.2–12.9)
POTASSIUM SERPL-SCNC: 4.2 MMOL/L (ref 3.5–5.1)
PROT SERPL-MCNC: 6.6 G/DL (ref 6–8.4)
RBC # BLD AUTO: 3.37 M/UL (ref 4–5.4)
SODIUM SERPL-SCNC: 139 MMOL/L (ref 136–145)
TRIGL SERPL-MCNC: 67 MG/DL (ref 30–150)
WBC # BLD AUTO: 3.17 K/UL (ref 3.9–12.7)

## 2020-12-08 PROCEDURE — 80053 COMPREHEN METABOLIC PANEL: CPT | Mod: HCNC

## 2020-12-08 PROCEDURE — 85027 COMPLETE CBC AUTOMATED: CPT | Mod: HCNC

## 2020-12-08 PROCEDURE — 83735 ASSAY OF MAGNESIUM: CPT | Mod: HCNC

## 2020-12-08 PROCEDURE — 80061 LIPID PANEL: CPT | Mod: HCNC

## 2020-12-08 PROCEDURE — 36415 COLL VENOUS BLD VENIPUNCTURE: CPT | Mod: HCNC,PO

## 2020-12-14 ENCOUNTER — LAB VISIT (OUTPATIENT)
Dept: INTERNAL MEDICINE | Facility: CLINIC | Age: 85
End: 2020-12-14
Payer: MEDICARE

## 2020-12-14 DIAGNOSIS — Z95.818 PRESENCE OF WATCHMAN LEFT ATRIAL APPENDAGE CLOSURE DEVICE: ICD-10-CM

## 2020-12-14 DIAGNOSIS — Z01.812 PRE-PROCEDURE LAB EXAM: ICD-10-CM

## 2020-12-14 DIAGNOSIS — I48.11 LONGSTANDING PERSISTENT ATRIAL FIBRILLATION: ICD-10-CM

## 2020-12-14 LAB — SARS-COV-2 RNA RESP QL NAA+PROBE: NOT DETECTED

## 2020-12-14 PROCEDURE — U0003 INFECTIOUS AGENT DETECTION BY NUCLEIC ACID (DNA OR RNA); SEVERE ACUTE RESPIRATORY SYNDROME CORONAVIRUS 2 (SARS-COV-2) (CORONAVIRUS DISEASE [COVID-19]), AMPLIFIED PROBE TECHNIQUE, MAKING USE OF HIGH THROUGHPUT TECHNOLOGIES AS DESCRIBED BY CMS-2020-01-R: HCPCS | Mod: HCNC

## 2020-12-15 ENCOUNTER — HOSPITAL ENCOUNTER (OUTPATIENT)
Dept: CARDIOLOGY | Facility: HOSPITAL | Age: 85
Discharge: HOME | End: 2020-12-15
Attending: INTERNAL MEDICINE | Admitting: INTERNAL MEDICINE
Payer: MEDICARE

## 2020-12-15 ENCOUNTER — OFFICE VISIT (OUTPATIENT)
Dept: CARDIOLOGY | Facility: CLINIC | Age: 85
End: 2020-12-15
Payer: MEDICARE

## 2020-12-15 ENCOUNTER — HOSPITAL ENCOUNTER (OUTPATIENT)
Facility: HOSPITAL | Age: 85
Discharge: HOME | End: 2020-12-15
Attending: INTERNAL MEDICINE | Admitting: INTERNAL MEDICINE
Payer: MEDICARE

## 2020-12-15 ENCOUNTER — ANESTHESIA EVENT (OUTPATIENT)
Dept: MEDSURG UNIT | Facility: HOSPITAL | Age: 85
End: 2020-12-15
Payer: MEDICARE

## 2020-12-15 ENCOUNTER — ANESTHESIA (OUTPATIENT)
Dept: MEDSURG UNIT | Facility: HOSPITAL | Age: 85
End: 2020-12-15
Payer: MEDICARE

## 2020-12-15 VITALS
OXYGEN SATURATION: 98 % | WEIGHT: 171.31 LBS | HEIGHT: 66 IN | HEART RATE: 66 BPM | BODY MASS INDEX: 27.53 KG/M2 | SYSTOLIC BLOOD PRESSURE: 148 MMHG | DIASTOLIC BLOOD PRESSURE: 65 MMHG

## 2020-12-15 VITALS
SYSTOLIC BLOOD PRESSURE: 141 MMHG | HEIGHT: 66 IN | OXYGEN SATURATION: 100 % | RESPIRATION RATE: 16 BRPM | TEMPERATURE: 98 F | DIASTOLIC BLOOD PRESSURE: 64 MMHG | BODY MASS INDEX: 27.48 KG/M2 | WEIGHT: 171 LBS | HEART RATE: 69 BPM

## 2020-12-15 VITALS
SYSTOLIC BLOOD PRESSURE: 134 MMHG | DIASTOLIC BLOOD PRESSURE: 64 MMHG | BODY MASS INDEX: 27.48 KG/M2 | WEIGHT: 171 LBS | HEIGHT: 66 IN

## 2020-12-15 DIAGNOSIS — Z91.81 RISK FOR FALLS: ICD-10-CM

## 2020-12-15 DIAGNOSIS — E78.00 PURE HYPERCHOLESTEROLEMIA: ICD-10-CM

## 2020-12-15 DIAGNOSIS — Z01.812 PRE-PROCEDURE LAB EXAM: ICD-10-CM

## 2020-12-15 DIAGNOSIS — I48.11 LONGSTANDING PERSISTENT ATRIAL FIBRILLATION: ICD-10-CM

## 2020-12-15 DIAGNOSIS — I51.89 LEFT VENTRICULAR DIASTOLIC DYSFUNCTION WITH PRESERVED SYSTOLIC FUNCTION: ICD-10-CM

## 2020-12-15 DIAGNOSIS — Z95.818 PRESENCE OF WATCHMAN LEFT ATRIAL APPENDAGE CLOSURE DEVICE: Primary | ICD-10-CM

## 2020-12-15 DIAGNOSIS — Z95.818 PRESENCE OF WATCHMAN LEFT ATRIAL APPENDAGE CLOSURE DEVICE: ICD-10-CM

## 2020-12-15 DIAGNOSIS — N18.30 STAGE 3 CHRONIC KIDNEY DISEASE, UNSPECIFIED WHETHER STAGE 3A OR 3B CKD: ICD-10-CM

## 2020-12-15 DIAGNOSIS — I48.20 CHRONIC ATRIAL FIBRILLATION: ICD-10-CM

## 2020-12-15 DIAGNOSIS — I10 ESSENTIAL HYPERTENSION: Chronic | ICD-10-CM

## 2020-12-15 DIAGNOSIS — Q20.8 ABNORMALITY OF LEFT ATRIAL APPENDAGE: ICD-10-CM

## 2020-12-15 PROBLEM — I48.0 PAROXYSMAL ATRIAL FIBRILLATION: Status: RESOLVED | Noted: 2020-08-05 | Resolved: 2020-12-15

## 2020-12-15 LAB — BSA FOR ECHO PROCEDURE: 1.9 M2

## 2020-12-15 PROCEDURE — 1125F AMNT PAIN NOTED PAIN PRSNT: CPT | Mod: HCNC,S$GLB,, | Performed by: PHYSICIAN ASSISTANT

## 2020-12-15 PROCEDURE — 93312 ECHO TRANSESOPHAGEAL: CPT | Mod: 26,HCNC,, | Performed by: INTERNAL MEDICINE

## 2020-12-15 PROCEDURE — 93312 TRANSESOPHAGEAL ECHO (TEE) (CUPID ONLY): ICD-10-PCS | Mod: 26,HCNC,, | Performed by: INTERNAL MEDICINE

## 2020-12-15 PROCEDURE — 99214 OFFICE O/P EST MOD 30 MIN: CPT | Mod: HCNC,S$GLB,, | Performed by: PHYSICIAN ASSISTANT

## 2020-12-15 PROCEDURE — 1159F PR MEDICATION LIST DOCUMENTED IN MEDICAL RECORD: ICD-10-PCS | Mod: HCNC,S$GLB,, | Performed by: PHYSICIAN ASSISTANT

## 2020-12-15 PROCEDURE — D9220A PRA ANESTHESIA: ICD-10-PCS | Mod: HCNC,CRNA,, | Performed by: NURSE ANESTHETIST, CERTIFIED REGISTERED

## 2020-12-15 PROCEDURE — 63600175 PHARM REV CODE 636 W HCPCS: Mod: HCNC | Performed by: NURSE ANESTHETIST, CERTIFIED REGISTERED

## 2020-12-15 PROCEDURE — 1159F MED LIST DOCD IN RCRD: CPT | Mod: HCNC,S$GLB,, | Performed by: PHYSICIAN ASSISTANT

## 2020-12-15 PROCEDURE — 93325 DOPPLER ECHO COLOR FLOW MAPG: CPT | Mod: HCNC

## 2020-12-15 PROCEDURE — D9220A PRA ANESTHESIA: Mod: HCNC,CRNA,, | Performed by: NURSE ANESTHETIST, CERTIFIED REGISTERED

## 2020-12-15 PROCEDURE — 93010 ELECTROCARDIOGRAM REPORT: CPT | Mod: HCNC,,, | Performed by: INTERNAL MEDICINE

## 2020-12-15 PROCEDURE — 1125F PR PAIN SEVERITY QUANTIFIED, PAIN PRESENT: ICD-10-PCS | Mod: HCNC,S$GLB,, | Performed by: PHYSICIAN ASSISTANT

## 2020-12-15 PROCEDURE — 99499 RISK ADDL DX/OHS AUDIT: ICD-10-PCS | Mod: S$GLB,,, | Performed by: PHYSICIAN ASSISTANT

## 2020-12-15 PROCEDURE — D9220A PRA ANESTHESIA: ICD-10-PCS | Mod: HCNC,ANES,, | Performed by: ANESTHESIOLOGY

## 2020-12-15 PROCEDURE — 93325 DOPPLER ECHO COLOR FLOW MAPG: CPT | Mod: 26,HCNC,, | Performed by: INTERNAL MEDICINE

## 2020-12-15 PROCEDURE — 37000008 HC ANESTHESIA 1ST 15 MINUTES: Mod: HCNC

## 2020-12-15 PROCEDURE — 99999 PR PBB SHADOW E&M-EST. PATIENT-LVL IV: ICD-10-PCS | Mod: PBBFAC,HCNC,, | Performed by: PHYSICIAN ASSISTANT

## 2020-12-15 PROCEDURE — 93325 TRANSESOPHAGEAL ECHO (TEE) (CUPID ONLY): ICD-10-PCS | Mod: 26,HCNC,, | Performed by: INTERNAL MEDICINE

## 2020-12-15 PROCEDURE — 93010 EKG 12-LEAD: ICD-10-PCS | Mod: HCNC,,, | Performed by: INTERNAL MEDICINE

## 2020-12-15 PROCEDURE — 99214 PR OFFICE/OUTPT VISIT, EST, LEVL IV, 30-39 MIN: ICD-10-PCS | Mod: HCNC,S$GLB,, | Performed by: PHYSICIAN ASSISTANT

## 2020-12-15 PROCEDURE — 99499 UNLISTED E&M SERVICE: CPT | Mod: S$GLB,,, | Performed by: PHYSICIAN ASSISTANT

## 2020-12-15 PROCEDURE — D9220A PRA ANESTHESIA: Mod: HCNC,ANES,, | Performed by: ANESTHESIOLOGY

## 2020-12-15 PROCEDURE — 93005 ELECTROCARDIOGRAM TRACING: CPT | Mod: HCNC

## 2020-12-15 PROCEDURE — 37000009 HC ANESTHESIA EA ADD 15 MINS: Mod: HCNC

## 2020-12-15 PROCEDURE — 25000003 PHARM REV CODE 250: Mod: HCNC | Performed by: NURSE ANESTHETIST, CERTIFIED REGISTERED

## 2020-12-15 PROCEDURE — 99999 PR PBB SHADOW E&M-EST. PATIENT-LVL IV: CPT | Mod: PBBFAC,HCNC,, | Performed by: PHYSICIAN ASSISTANT

## 2020-12-15 RX ORDER — ONDANSETRON 2 MG/ML
4 INJECTION INTRAMUSCULAR; INTRAVENOUS DAILY PRN
Status: DISCONTINUED | OUTPATIENT
Start: 2020-12-15 | End: 2020-12-15 | Stop reason: HOSPADM

## 2020-12-15 RX ORDER — LIDOCAINE HYDROCHLORIDE 20 MG/ML
INJECTION INTRAVENOUS
Status: DISCONTINUED | OUTPATIENT
Start: 2020-12-15 | End: 2020-12-15

## 2020-12-15 RX ORDER — ONDANSETRON 2 MG/ML
4 INJECTION INTRAMUSCULAR; INTRAVENOUS ONCE AS NEEDED
Status: DISCONTINUED | OUTPATIENT
Start: 2020-12-15 | End: 2020-12-15 | Stop reason: HOSPADM

## 2020-12-15 RX ORDER — PROPOFOL 10 MG/ML
VIAL (ML) INTRAVENOUS
Status: DISCONTINUED | OUTPATIENT
Start: 2020-12-15 | End: 2020-12-15

## 2020-12-15 RX ORDER — FENTANYL CITRATE 50 UG/ML
INJECTION, SOLUTION INTRAMUSCULAR; INTRAVENOUS
Status: DISCONTINUED | OUTPATIENT
Start: 2020-12-15 | End: 2020-12-15

## 2020-12-15 RX ORDER — SODIUM CHLORIDE 9 MG/ML
INJECTION, SOLUTION INTRAVENOUS CONTINUOUS PRN
Status: DISCONTINUED | OUTPATIENT
Start: 2020-12-15 | End: 2020-12-15

## 2020-12-15 RX ORDER — FENTANYL CITRATE 50 UG/ML
25 INJECTION, SOLUTION INTRAMUSCULAR; INTRAVENOUS EVERY 5 MIN PRN
Status: DISCONTINUED | OUTPATIENT
Start: 2020-12-15 | End: 2020-12-15 | Stop reason: HOSPADM

## 2020-12-15 RX ORDER — SODIUM CHLORIDE 0.9 % (FLUSH) 0.9 %
3 SYRINGE (ML) INJECTION
Status: DISCONTINUED | OUTPATIENT
Start: 2020-12-15 | End: 2020-12-15 | Stop reason: HOSPADM

## 2020-12-15 RX ORDER — DIPHENHYDRAMINE HYDROCHLORIDE 50 MG/ML
25 INJECTION INTRAMUSCULAR; INTRAVENOUS EVERY 6 HOURS PRN
Status: DISCONTINUED | OUTPATIENT
Start: 2020-12-15 | End: 2020-12-15 | Stop reason: HOSPADM

## 2020-12-15 RX ORDER — LIDOCAINE HYDROCHLORIDE 20 MG/ML
SOLUTION OROPHARYNGEAL
Status: DISCONTINUED | OUTPATIENT
Start: 2020-12-15 | End: 2020-12-15

## 2020-12-15 RX ORDER — CLOPIDOGREL BISULFATE 75 MG/1
75 TABLET ORAL DAILY
Qty: 182 TABLET | Refills: 0 | Status: SHIPPED | OUTPATIENT
Start: 2020-12-15 | End: 2021-07-09

## 2020-12-15 RX ORDER — PROPOFOL 10 MG/ML
VIAL (ML) INTRAVENOUS CONTINUOUS PRN
Status: DISCONTINUED | OUTPATIENT
Start: 2020-12-15 | End: 2020-12-15

## 2020-12-15 RX ADMIN — PROPOFOL 100 MCG/KG/MIN: 10 INJECTION, EMULSION INTRAVENOUS at 11:12

## 2020-12-15 RX ADMIN — LIDOCAINE HYDROCHLORIDE 15 ML: 20 SOLUTION ORAL; TOPICAL at 11:12

## 2020-12-15 RX ADMIN — LIDOCAINE HYDROCHLORIDE 50 MG: 20 INJECTION, SOLUTION INTRAVENOUS at 11:12

## 2020-12-15 RX ADMIN — SODIUM CHLORIDE: 9 INJECTION, SOLUTION INTRAVENOUS at 11:12

## 2020-12-15 RX ADMIN — PROPOFOL 60 MG: 10 INJECTION, EMULSION INTRAVENOUS at 11:12

## 2020-12-15 RX ADMIN — FENTANYL CITRATE 25 MCG: 50 INJECTION INTRAMUSCULAR; INTRAVENOUS at 11:12

## 2020-12-15 NOTE — HPI
88 F with Meniere's disease, hypercholesterolemia, Chronic diastolic heart failure, Chronic atrial fibrillation, venous insufficiency of both lower extremities and Paroxysmal atrial fibrillation CKD III. She presented for 6 weeks follow up post Watchman implantation. The plan following SONYA post implant if < 5mm nelida-device jet: D/C Eliquis. Continue ASA 81 mg and Start Clopidogrel 75 mg.     Card Meds: apixaban 5 mg, aspirin 81 mg, fluticasone propionate 50 mcg furosemide 20 mg, pravastatin 40 mg.    SONYA Intraoperatively 11/4/2020  · With normal systolic function. The estimated ejection fraction is 55%.  WATCHMAN FLX CLSR 31MM device.  · Normal right ventricular size with normal right ventricular systolic function.  · Severe biatrial enlargement.· Moderate mitral regurgitation.· Mild tricuspid regurgitation.  · Mild pulmonic regurgitation.  · Normal appearing left atrial appendage. No thrombus is present in the appendage.  · 31 mm Watchman FLX successfully implanted, 25% compression.  · Iatrogenic ASD present with left to right shunting indicated by color flow Doppler    Dysphagia or odynophagia:  No  Liver Disease, esophageal disease, or known varices:  No  Upper GI Bleeding: No  Snoring:  Yes  Sleep Apnea:  Yes  Family history of anesthetic difficulties:  No  Last oral intake:  12 hours ago  Able to move neck in all directions:  Yes

## 2020-12-15 NOTE — TRANSFER OF CARE
"Anesthesia Transfer of Care Note    Patient: Jenniffer Jimenez    Procedure(s) Performed: Procedure(s) (LRB):  ECHOCARDIOGRAM,TRANSESOPHAGEAL (N/A)    Patient location: PACU    Anesthesia Type: general    Transport from OR: Transported from OR on 2-3 L/min O2 by NC with adequate spontaneous ventilation    Post pain: adequate analgesia    Post assessment: no apparent anesthetic complications and tolerated procedure well    Post vital signs: stable    Level of consciousness: awake and alert    Nausea/Vomiting: no nausea/vomiting    Complications: none    Transfer of care protocol was followed      Last vitals:   Visit Vitals  /64 (BP Location: Left arm, Patient Position: Lying)   Pulse 74   Temp 36.4 °C (97.5 °F) (Oral)   Resp 16   Ht 5' 6" (1.676 m)   Wt 77.6 kg (171 lb)   LMP  (LMP Unknown)   SpO2 98%   Breastfeeding No   BMI 27.60 kg/m²     "

## 2020-12-15 NOTE — ANESTHESIA POSTPROCEDURE EVALUATION
Anesthesia Post Evaluation    Patient: Jenniffer Jimenez    Procedure(s) Performed: Procedure(s) (LRB):  ECHOCARDIOGRAM,TRANSESOPHAGEAL (N/A)    Final Anesthesia Type: general    Patient location during evaluation: PACU  Patient participation: Yes- Able to Participate  Level of consciousness: awake and alert  Post-procedure vital signs: reviewed and stable  Pain management: adequate  Airway patency: patent    PONV status at discharge: No PONV  Anesthetic complications: no      Cardiovascular status: blood pressure returned to baseline  Respiratory status: unassisted  Follow-up not needed.          Vitals Value Taken Time   /64 12/15/20 1222   Temp 36.7 °C (98.1 °F) 12/15/20 1222   Pulse 69 12/15/20 1222   Resp 16 12/15/20 1222   SpO2 100 % 12/15/20 1222         No case tracking events are documented in the log.      Pain/Hammad Score: Hammad Score: 10 (12/15/2020 12:22 PM)

## 2020-12-15 NOTE — HOSPITAL COURSE
Presented for eval of Watchhaley 6 weeks after implantation. Discussed with Dr. Curtis, given no para device leak and stable findings and compression, switch from anticoagulation Apixaban to Plavix for the next 6 months followed by ASA monotherapy.

## 2020-12-15 NOTE — PROGRESS NOTES
Interventional Cardiology Clinic Note  Reason for Visit: S/p Watchman follow up  Last Clinic Visit: 10/6/2020  PCP: Rubi Young DO  Primary Cardiologist: Dr. Saenz    HPI:   Jenniffer Jimenez is a 88 y.o. female with chronic Afib, CKD, history of CVA and high risk for falls who presents for follow up s/p Watchman.     Patient initially presented for outpatient Watchman device on 08/05/2020.  On SONYA noted to have thrombus in left atrial appendage when she was on Coumadin.  She was transitioned to Eliquis at that time. She presented on 11/4/20 for Watchman procedure and her SONYA showed resolution of the FERNANDA thrombus. She underwent successful 31 mm Watchman device placement. She was continued on Eliquis. She is scheduled for SONYA today.        Doing well since procedure. She stopped dieting and started eating much more since Thanksgiving and now has more leg swelling than usual. She has gained about 10 lbs. Otherwise no complaints and compliant with ASA and Eliquis. Also compliant with antibiotic prophylaxis before dental work.       ROS:      Review of Systems   Constitution: Positive for weight gain. Negative for diaphoresis, malaise/fatigue and weight loss.   HENT: Negative for nosebleeds.    Eyes: Negative for vision loss in left eye, vision loss in right eye and visual disturbance.   Cardiovascular: Positive for leg swelling. Negative for chest pain, claudication, dyspnea on exertion, near-syncope, orthopnea, palpitations, paroxysmal nocturnal dyspnea and syncope.   Respiratory: Negative for cough, shortness of breath, sleep disturbances due to breathing, snoring and wheezing.    Hematologic/Lymphatic: Negative for bleeding problem. Bruises/bleeds easily.   Skin: Negative for poor wound healing and rash.   Musculoskeletal: Positive for arthritis, back pain and joint pain. Negative for muscle cramps and myalgias.   Gastrointestinal: Negative for bloating, abdominal pain, diarrhea, heartburn, melena,  nausea and vomiting.   Genitourinary: Negative for hematuria and nocturia.   Neurological: Positive for loss of balance. Negative for brief paralysis, dizziness, headaches, numbness and weakness.   Psychiatric/Behavioral: Positive for memory loss. Negative for depression. The patient does not have insomnia.    Allergic/Immunologic: Negative for hives.       PMH:     Past Medical History:   Diagnosis Date    Amblyopia of left eye 4/10/2013    Anticoagulant long-term use     Coumadin - A-Fib    Anxiety     Arthritis     Facet arthropathy, Lumbosacral    Atherosclerosis of aorta     Atrial fibrillation     Auricular fibrillation     Central retinal vein occlusion of left eye     CHF (congestive heart failure)     Chronic kidney disease, stage 3     Coronary artery disease     Depression     Diastolic heart failure 2014    Exotropia of both eyes 2013    recession RSR 5.0mm w/ adj; recession LR os 5.0 w/ adj; resect MR os  4.0mm    Hearing loss     History of resection of small bowel     Hyperlipidemia     Hypertension     Hypertensive retinopathy of both eyes     Hypoglycemia     Macular degeneration     Meniere's disease of both ears     OA (osteoarthritis) of shoulder     Right    Osteoporosis     Other and unspecified hyperlipidemia     Posterior vitreous detachment of both eyes     Rhinitis     TIA (transient ischemic attack)      Past Surgical History:   Procedure Laterality Date    APPENDECTOMY      CARDIAC CATHETERIZATION      CATARACT EXTRACTION W/  INTRAOCULAR LENS IMPLANT Bilateral      SECTION, CLASSIC      CLOSURE OF LEFT ATRIAL APPENDAGE USING DEVICE N/A 2020    Procedure: Left atrial appendage closure device;  Surgeon: Abundio Curtis MD;  Location: [facility] CATH LAB;  Service: Cardiology;  Laterality: N/A;    HYSTERECTOMY      INNER EAR SURGERY      JOINT REPLACEMENT      LEFT KNEE REPLACEMENT IN  -    OOPHORECTOMY      SINUS SURGERY       "STRABISMUS SURGERY  2/6/13    RSR recession 5 mm, LLR recession 5 mm and LMR resection 4mm    STRABISMUS SURGERY  03/19/2014    recess LR OD 6mm    TONSILLECTOMY       Allergies:     Review of patient's allergies indicates:   Allergen Reactions    Bactrim [sulfamethoxazole-trimethoprim] Other (See Comments)     "Couldn't walk"    Opioids - morphine analogues Other (See Comments)     Bowel issues; bowel obstruction    Tizanidine Other (See Comments)     "Lips were numb,  Almost passed out."    Tramadol Hallucinations    Beta-blockers (beta-adrenergic blocking agts) Other (See Comments)     Can not go on beta blockers for long period of time - due to taking allergy injections    Phenergan [promethazine] Other (See Comments)     Per pt. request- saw sisters have bad reaction to drug     Medications:     Current Outpatient Medications on File Prior to Visit   Medication Sig Dispense Refill    amoxicillin (AMOXIL) 500 MG capsule Take 4 capsule by mouth 1 hour prior dental appointment 12 capsule 0    apixaban (ELIQUIS) 5 mg Tab Take 1 tablet (5 mg total) by mouth 2 (two) times daily. 60 tablet 11    aspirin (ECOTRIN) 81 MG EC tablet Take 1 tablet (81 mg total) by mouth once daily. 360 tablet 0    aspirin (ECOTRIN) 81 MG EC tablet Take 81 mg by mouth once daily.      cholecalciferol, vitamin D3, (VITAMIN D3 ORAL) Take 1 tablet by mouth once daily.      diclofenac sodium (VOLTAREN) 1 % Gel Apply 2 g topically 3 (three) times daily. Apply to ankle for pain 1 Tube 0    docusate sodium (COLACE) 100 MG capsule Take 100 mg by mouth once daily.      fluticasone propionate (FLONASE) 50 mcg/actuation nasal spray USE 1 SPRAY IN EACH NOSTRIL ONE TIME DAILY 32 g 11    furosemide (LASIX) 20 MG tablet TAKE 1 TABLET BY MOUTH DAILY; MAY TAKE AN ADDITIONAL TABLET AS NEEDED FOR SWELLING. 100 tablet 3    multivitamin (ONE DAILY MULTIVITAMIN) per tablet Take 1 tablet by mouth once daily.      mupirocin (BACTROBAN) 2 % " ointment Apply to affected area 3 times daily 22 g 1    peg 400-propylene glycol (SYSTANE) 0.4-0.3 % Drop Place 1-2 drops into both eyes as needed.       potassium chloride (MICRO-K) 10 MEQ CpSR TAKE 1 CAPSULE EVERY DAY 90 capsule 3    pravastatin (PRAVACHOL) 40 MG tablet Take 1 tablet (40 mg total) by mouth once daily. 90 tablet 3    triamcinolone (NASACORT) 55 mcg nasal inhaler 2 sprays by Nasal route once daily. 17 g 0    vitamin E 400 UNIT capsule Take 400 Units by mouth once daily.       No current facility-administered medications on file prior to visit.      Social History:     Social History     Tobacco Use    Smoking status: Former Smoker     Types: Cigarettes     Quit date: 10/29/1982     Years since quittin.1    Smokeless tobacco: Never Used   Substance Use Topics    Alcohol use: Not Currently     Alcohol/week: 0.0 standard drinks     Comment: socially     Family History:     Family History   Problem Relation Age of Onset    Hypertension Mother     Hypertension Father     Liver disease Sister     Diabetes Sister     Heart disease Sister     Diabetes Brother     Breast cancer Maternal Aunt     No Known Problems Maternal Uncle     No Known Problems Paternal Aunt     No Known Problems Paternal Uncle     No Known Problems Maternal Grandmother     No Known Problems Maternal Grandfather     No Known Problems Paternal Grandmother     No Known Problems Paternal Grandfather     No Known Problems Daughter     No Known Problems Son     Heart disease Sister     No Known Problems Son     No Known Problems Son     Cancer Neg Hx         in first degree relatives    Melanoma Neg Hx     Psoriasis Neg Hx     Lupus Neg Hx     Amblyopia Neg Hx     Blindness Neg Hx     Cataracts Neg Hx     Glaucoma Neg Hx     Macular degeneration Neg Hx     Retinal detachment Neg Hx     Strabismus Neg Hx     Stroke Neg Hx     Thyroid disease Neg Hx      Physical Exam:   BP (!) 148/65 (BP Location:  "Right arm, Patient Position: Sitting, BP Method: Large (Automatic))   Pulse 66   Ht 5' 5.5" (1.664 m)   Wt 77.7 kg (171 lb 4.8 oz)   LMP  (LMP Unknown)   SpO2 98%   BMI 28.07 kg/m²      Physical Exam   Constitutional: She is oriented to person, place, and time and well-developed, well-nourished, and in no distress.   HENT:   Head: Normocephalic and atraumatic.   Eyes: Conjunctivae and EOM are normal. No scleral icterus.   Neck: Neck supple. No JVD present. No thyromegaly present.   Cardiovascular: Normal rate, normal heart sounds and intact distal pulses. An irregularly irregular rhythm present. Exam reveals no gallop and no friction rub.   No murmur heard.  Pulmonary/Chest: Effort normal and breath sounds normal. She has no wheezes. She has no rales. She exhibits no tenderness.   Abdominal: Soft. Bowel sounds are normal. She exhibits no distension. There is no abdominal tenderness.   Musculoskeletal:         General: Edema (2+pitting pedal edema bilaterally) present.   Neurological: She is alert and oriented to person, place, and time.   Skin: Skin is warm and dry. No rash noted. No cyanosis or erythema. No pallor. Nails show no clubbing.   Psychiatric: Affect normal.   Nursing note and vitals reviewed.       Labs:     Lab Results   Component Value Date     12/08/2020    K 4.2 12/08/2020     12/08/2020    CO2 29 12/08/2020    BUN 19 12/08/2020    CREATININE 0.9 12/08/2020    ANIONGAP 10 12/08/2020     Lab Results   Component Value Date    HGBA1C 5.6 06/25/2020     Lab Results   Component Value Date     (H) 01/01/2020     (H) 01/24/2018     (H) 01/24/2017    Lab Results   Component Value Date    WBC 3.17 (L) 12/08/2020    HGB 10.1 (L) 12/08/2020    HCT 31.7 (L) 12/08/2020     12/08/2020    GRAN 2.9 11/05/2020    GRAN 65.2 11/05/2020     Lab Results   Component Value Date    CHOL 164 12/08/2020    HDL 64 12/08/2020    LDLCALC 86.6 12/08/2020    TRIG 67 12/08/2020      "     Imaging:   SONYA 11/4/20  · With normal systolic function. The estimated ejection fraction is 55%.  · Normal right ventricular size with normal right ventricular systolic function.  · Severe biatrial enlargement.  · Moderate mitral regurgitation.  · Mild tricuspid regurgitation.  · Mild pulmonic regurgitation.  · Normal appearing left atrial appendage. No thrombus is present in the appendage.  · 31 mm Watchman FLX successfully implanted, 25% compression.  · Iatrogenic ASD present with left to right shunting indicated by color flow Doppler.       Assessment:     1. Presence of Watchman left atrial appendage closure device    2. Chronic atrial fibrillation    3. Essential hypertension    4. Pure hypercholesterolemia    5. Left ventricular diastolic dysfunction with preserved systolic function    6. Stage 3 chronic kidney disease, unspecified whether stage 3a or 3b CKD    7. Risk for falls      Plan:     Chronic Afib s/p Watchman device   -Scheduled for SONYA today. If < 5mm nelida-device jet:   -D/C Eliquis.   -Continue ASA 81 mg   -Start Clopidogrel 75 mg  -At 6 months post implant:   -D/C Clopidogrel   -Continue ASA 81 mg indefinitely.  -Endocarditis prophylaxis for life    Hyperlipidemia  -Controlled on Pravastatin      Hypertension  - Typically well controlled. Needs to be compliant with low sodium diet  - Continue furosemide 20 mg Daily     Follow up with Dr. Saenz/Mary Smith as scheduled.    Signed:  Ashia Morocho PA-C  Cardiology   [phone] - Interventional  [phone] - General  12/15/2020 8:21 AM

## 2020-12-15 NOTE — SUBJECTIVE & OBJECTIVE
"Past Medical History:   Diagnosis Date    Amblyopia of left eye 4/10/2013    Anticoagulant long-term use     Coumadin - A-Fib    Anxiety     Arthritis     Facet arthropathy, Lumbosacral    Atherosclerosis of aorta     Atrial fibrillation     Auricular fibrillation     Central retinal vein occlusion of left eye     CHF (congestive heart failure)     Chronic kidney disease, stage 3     Coronary artery disease     Depression     Diastolic heart failure 2014    Exotropia of both eyes 2013    recession RSR 5.0mm w/ adj; recession LR os 5.0 w/ adj; resect MR os  4.0mm    Hearing loss     History of resection of small bowel     Hyperlipidemia     Hypertension     Hypertensive retinopathy of both eyes     Hypoglycemia     Macular degeneration     Meniere's disease of both ears     OA (osteoarthritis) of shoulder     Right    Osteoporosis     Other and unspecified hyperlipidemia     Posterior vitreous detachment of both eyes     Rhinitis     TIA (transient ischemic attack)        Past Surgical History:   Procedure Laterality Date    APPENDECTOMY      CARDIAC CATHETERIZATION      CATARACT EXTRACTION W/  INTRAOCULAR LENS IMPLANT Bilateral      SECTION, CLASSIC      CLOSURE OF LEFT ATRIAL APPENDAGE USING DEVICE N/A 2020    Procedure: Left atrial appendage closure device;  Surgeon: Abundio Curtis MD;  Location: [facility] CATH LAB;  Service: Cardiology;  Laterality: N/A;    HYSTERECTOMY      INNER EAR SURGERY      JOINT REPLACEMENT      LEFT KNEE REPLACEMENT IN 2013 -    OOPHORECTOMY      SINUS SURGERY      STRABISMUS SURGERY  13    RSR recession 5 mm, LLR recession 5 mm and LMR resection 4mm    STRABISMUS SURGERY  2014    recess LR OD 6mm    TONSILLECTOMY         Review of patient's allergies indicates:   Allergen Reactions    Bactrim [sulfamethoxazole-trimethoprim] Other (See Comments)     "Couldn't walk"    Opioids - morphine analogues Other (See " "Comments)     Bowel issues; bowel obstruction    Tizanidine Other (See Comments)     "Lips were numb,  Almost passed out."    Tramadol Hallucinations    Beta-blockers (beta-adrenergic blocking agts) Other (See Comments)     Can not go on beta blockers for long period of time - due to taking allergy injections    Phenergan [promethazine] Other (See Comments)     Per pt. request- saw sisters have bad reaction to drug       No current facility-administered medications on file prior to encounter.      Current Outpatient Medications on File Prior to Encounter   Medication Sig    apixaban (ELIQUIS) 5 mg Tab Take 1 tablet (5 mg total) by mouth 2 (two) times daily.    aspirin (ECOTRIN) 81 MG EC tablet Take 1 tablet (81 mg total) by mouth once daily.    cholecalciferol, vitamin D3, (VITAMIN D3 ORAL) Take 1 tablet by mouth once daily.    docusate sodium (COLACE) 100 MG capsule Take 100 mg by mouth once daily.    fluticasone propionate (FLONASE) 50 mcg/actuation nasal spray USE 1 SPRAY IN EACH NOSTRIL ONE TIME DAILY    furosemide (LASIX) 20 MG tablet TAKE 1 TABLET BY MOUTH DAILY; MAY TAKE AN ADDITIONAL TABLET AS NEEDED FOR SWELLING.    multivitamin (ONE DAILY MULTIVITAMIN) per tablet Take 1 tablet by mouth once daily.    potassium chloride (MICRO-K) 10 MEQ CpSR TAKE 1 CAPSULE EVERY DAY    pravastatin (PRAVACHOL) 40 MG tablet Take 1 tablet (40 mg total) by mouth once daily.    triamcinolone (NASACORT) 55 mcg nasal inhaler 2 sprays by Nasal route once daily.    vitamin E 400 UNIT capsule Take 400 Units by mouth once daily.    aspirin (ECOTRIN) 81 MG EC tablet Take 81 mg by mouth once daily.    diclofenac sodium (VOLTAREN) 1 % Gel Apply 2 g topically 3 (three) times daily. Apply to ankle for pain    mupirocin (BACTROBAN) 2 % ointment Apply to affected area 3 times daily (Patient not taking: Reported on 12/15/2020)    peg 400-propylene glycol (SYSTANE) 0.4-0.3 % Drop Place 1-2 drops into both eyes as needed.  "     Family History     Problem Relation (Age of Onset)    Breast cancer Maternal Aunt    Diabetes Sister, Brother    Heart disease Sister, Sister    Hypertension Mother, Father    Liver disease Sister    No Known Problems Maternal Uncle, Paternal Aunt, Paternal Uncle, Maternal Grandmother, Maternal Grandfather, Paternal Grandmother, Paternal Grandfather, Daughter, Son, Son, Son        Tobacco Use    Smoking status: Former Smoker     Types: Cigarettes     Quit date: 10/29/1982     Years since quittin.1    Smokeless tobacco: Never Used   Substance and Sexual Activity    Alcohol use: Yes     Alcohol/week: 2.0 standard drinks     Types: 1 Cans of beer, 1 Shots of liquor per week     Comment: socially    Drug use: No    Sexual activity: Never     Review of Systems   Constitution: Positive for malaise/fatigue. Negative for chills and decreased appetite.   HENT: Negative for congestion.    Cardiovascular: Negative for chest pain, irregular heartbeat and leg swelling.   Respiratory: Positive for shortness of breath. Negative for cough.    Endocrine: Negative for cold intolerance and heat intolerance.   Skin: Negative for rash.   Musculoskeletal: Negative for arthritis and back pain.   Gastrointestinal: Negative for abdominal pain, constipation and diarrhea.   Genitourinary: Negative for dysuria and hematuria.   Neurological: Negative for dizziness and headaches.   Psychiatric/Behavioral: Negative for altered mental status.     Objective:     Vital Signs (Most Recent):    Vital Signs (24h Range):  Pulse:  [61-66] 66  SpO2:  [98 %] 98 %  BP: (147-148)/(65-67) 148/65     Weight: 77.6 kg (171 lb)  Body mass index is 27.6 kg/m².            No intake or output data in the 24 hours ending 12/15/20 1017    Lines/Drains/Airways     None                 Physical Exam   Constitutional: She is oriented to person, place, and time. She appears well-nourished. No distress.   HENT:   Head: Normocephalic.   Eyes: Pupils are equal,  round, and reactive to light.   Neck: No JVD present. No thyromegaly present.   Cardiovascular: Normal rate.   Murmur heard.  Pulmonary/Chest: Effort normal. No respiratory distress. She has no wheezes. She has no rales.   Abdominal: Soft.   Musculoskeletal:         General: Edema present.   Neurological: She is alert and oriented to person, place, and time.   Vitals reviewed.      Significant Labs: All pertinent lab results from the last 24 hours have been reviewed.    Significant Imaging: Echocardiogram:   2D echo with color flow doppler:   Results for orders placed or performed in visit on 05/19/17   2D echo with color flow doppler   Result Value Ref Range    EF 55 55 - 65    Mitral Valve Regurgitation TRIVIAL TO MILD     Est. PA Systolic Pressure 36.3     Tricuspid Valve Regurgitation MILD     Narrative    Date of Procedure: 05/19/2017        TEST DESCRIPTION       Aorta: The aortic root is normal in size, measuring 3.1 cm at sinotubular junction and 3.0 cm at Sinuses of Valsalva. The proximal ascending aorta is normal in size, measuring 3.0 cm across.     Left Atrium: The left atrial volume index is severely enlarged, measuring 50.62 cc/m2.     Left Ventricle: The left ventricle is normal in size, with an end-diastolic diameter of 5.0 cm, and an end-systolic diameter of 3.5 cm. LV wall thickness is normal, with the septum and the posterior wall each measuring 0.8 cm across. Relative wall   thickness was normal at 0.32, and the LV mass index was 81.4 g/m2 consistent with normal left ventricular mass. There are no regional wall motion abnormalities. Left ventricular systolic function appears normal. Visually estimated ejection fraction is   55-60%. The LV Doppler derived stroke volume equals 42.0 ccs.     Diastolic indices: Mitral inflow pattern reveals fusion of E & A waves. E wave velocity 1.4 m/s, E/A ratio 4.0,  msec., E/e' ratio(sep) 17. Diastolic function is indeterminate.     Right Atrium: The right  atrium is upper limit of normal, measuring 6.1 cm in length and 4.2 cm in width in the apical view. The end-systolic right atrial area is 20.0 cm2.     Right Ventricle: The right ventricle is normal in size measuring 4.0 cm at the base in the apical right ventricle-focused view. Global right ventricular systolic function appears normal. Tricuspid annular plane systolic excursion (TAPSE) is 1.1 cm. The   estimated PA systolic pressure is 36 mmHg.     Aortic Valve:  The aortic valve is mildly sclerotic. The aortic valve is tri-leaflet in structure.     Mitral Valve:  The mitral valve is mildly sclerotic. There is trivial to mild mitral regurgitation. There is mitral annular calcification.     Tricuspid Valve:  There is mild tricuspid regurgitation. Tricuspid valve is normal in structure with normal leaflet mobility.     Pulmonary Valve:  There is trivial pulmonic regurgitation. Pulmonary valve is normal in structure with normal leaflet mobility.     IVC: IVC is enlarged but collapses > 50% with a sniff, suggesting intermediate right atrial pressure of 8 mmHg.     Intracavitary: There is no evidence of pericardial effusion, intracavity mass, thrombi, or vegetation.         CONCLUSIONS     1 - Normal left ventricular systolic function (EF 55-60%).     2 - Normal right ventricular systolic function .     3 - Indeterminate LV diastolic function.     4 - The estimated PA systolic pressure is 36 mmHg.     5 - Trivial to mild mitral regurgitation.     6 - Mild tricuspid regurgitation.     7 - Trivial pulmonic regurgitation.     8 - Intermediate central venous pressure.             This document has been electronically    SIGNED BY: Elo Groves MD On: 05/19/2017 11:29    This document was originally electronically signed on: 05/19/2017 11:06    and Transthoracic echo (TTE) complete (Cupid Only): No results found. However, due to the size of the patient record, not all encounters were searched. Please check Results  Review for a complete set of results.

## 2020-12-15 NOTE — ASSESSMENT & PLAN NOTE
88 F with Meniere's disease, hypercholesterolemia, Chronic diastolic heart failure, Chronic atrial fibrillation, venous insufficiency of both lower extremities and Paroxysmal atrial fibrillation CKD III. She presented for 6 weeks follow up post Watchman implantation. The plan following SONYA post implant if < 5mm nelida-device jet: D/C Eliquis. Continue ASA 81 mg and Start Clopidogrel 75 mg.     Card Meds: apixaban 5 mg, aspirin 81 mg, fluticasone propionate 50 mcg furosemide 20 mg, pravastatin 40 mg.    1. SONYA for evaluation of Watchman FLX 31 mm   -No absolute contraindications of esophageal stricture, tumor, perforation, laceration,or diverticulum and/or active GI bleed  -The risks, benefits & alternatives of the procedure were explained to the patient.   -The risks of transesophageal echo include but are not limited to:  Dental trauma, esophageal trauma/perforation, bleeding, laryngospasm/brochospasm, aspiration, sore throat/hoarseness, & dislodgement of the endotracheal tube/nasogastric tube (where applicable).    -The risks of moderate sedation include hypotension, respiratory depression, arrhythmias, bronchospasm, & death.    -Informed consent was obtained. The patient is agreeable to proceed with the procedure and all questions and concerns addressed.

## 2020-12-15 NOTE — DISCHARGE SUMMARY
[facility]  Cardiology  Discharge Summary      Patient Name: Jenniffer Jimenez  MRN: [id number]  Admission Date: 12/15/2020  Hospital Length of Stay: 0 days  Discharge Date and Time:  12/15/2020 2:12 PM  Attending Physician: No att. providers found    Discharging Provider: Hitesh Varela MD  Primary Care Physician: Rubi Young DO    HPI:   88 F with Meniere's disease, hypercholesterolemia, Chronic diastolic heart failure, Chronic atrial fibrillation, venous insufficiency of both lower extremities and Paroxysmal atrial fibrillation CKD III. She presented for 6 weeks follow up post Watchman implantation. The plan following SONYA post implant if < 5mm nelida-device jet: D/C Eliquis. Continue ASA 81 mg and Start Clopidogrel 75 mg.     Card Meds: apixaban 5 mg, aspirin 81 mg, fluticasone propionate 50 mcg furosemide 20 mg, pravastatin 40 mg.    SONYA Intraoperatively 11/4/2020  · With normal systolic function. The estimated ejection fraction is 55%.  WATCHMAN FLX CLSR 31MM device.  · Normal right ventricular size with normal right ventricular systolic function.  · Severe biatrial enlargement.· Moderate mitral regurgitation.· Mild tricuspid regurgitation.  · Mild pulmonic regurgitation.  · Normal appearing left atrial appendage. No thrombus is present in the appendage.  · 31 mm Watchman FLX successfully implanted, 25% compression.  · Iatrogenic ASD present with left to right shunting indicated by color flow Doppler    Dysphagia or odynophagia:  No  Liver Disease, esophageal disease, or known varices:  No  Upper GI Bleeding: No  Snoring:  Yes  Sleep Apnea:  Yes  Family history of anesthetic difficulties:  No  Last oral intake:  12 hours ago  Able to move neck in all directions:  Yes        Procedure(s) (LRB):  ECHOCARDIOGRAM,TRANSESOPHAGEAL (N/A)     Indwelling Lines/Drains at time of discharge:  Lines/Drains/Airways     None                 Hospital Course:  Presented for eval of  Kim 6 weeks after implantation. Discussed with Dr. Curtis, given no para device leak and stable findings and compression, switch from anticoagulation Apixaban to Plavix for the next 6 months followed by ASA monotherapy.     Consults:     Significant Diagnostic Studies: Labs: CBC No results for input(s): WBC, HGB, HCT, PLT in the last 48 hours.    Pending Diagnostic Studies:     None          Final Active Diagnoses:    Diagnosis Date Noted POA    Abnormality of left atrial appendage [Q20.8] 12/15/2020 Not Applicable      Problems Resolved During this Admission:     No new Assessment & Plan notes have been filed under this hospital service since the last note was generated.  Service: Cardiology      Discharged Condition: stable    Disposition: Home or Self Care    Follow Up:    Patient Instructions:   No discharge procedures on file.  Medications:  Reconciled Home Medications:      Medication List      START taking these medications    clopidogreL 75 mg tablet  Commonly known as: PLAVIX  Take 1 tablet (75 mg total) by mouth once daily.        CONTINUE taking these medications    amoxicillin 500 MG capsule  Commonly known as: AMOXIL  Take 4 capsule by mouth 1 hour prior dental appointment     * aspirin 81 MG EC tablet  Commonly known as: ECOTRIN  Take 81 mg by mouth once daily.     * aspirin 81 MG EC tablet  Commonly known as: ECOTRIN  Take 1 tablet (81 mg total) by mouth once daily.     diclofenac sodium 1 % Gel  Commonly known as: VOLTAREN  Apply 2 g topically 3 (three) times daily. Apply to ankle for pain     docusate sodium 100 MG capsule  Commonly known as: COLACE  Take 100 mg by mouth once daily.     fluticasone propionate 50 mcg/actuation nasal spray  Commonly known as: FLONASE  USE 1 SPRAY IN EACH NOSTRIL ONE TIME DAILY     furosemide 20 MG tablet  Commonly known as: LASIX  TAKE 1 TABLET BY MOUTH DAILY; MAY TAKE AN ADDITIONAL TABLET AS NEEDED FOR SWELLING.     mupirocin 2 % ointment  Commonly known as:  BACTROBAN  Apply to affected area 3 times daily     ONE DAILY MULTIVITAMIN per tablet  Generic drug: multivitamin  Take 1 tablet by mouth once daily.     potassium chloride 10 MEQ Cpsr  Commonly known as: MICRO-K  TAKE 1 CAPSULE EVERY DAY     pravastatin 40 MG tablet  Commonly known as: PRAVACHOL  Take 1 tablet (40 mg total) by mouth once daily.     SYSTANE (PROPYLENE GLYCOL) 0.4-0.3 % Drop  Generic drug: peg 400-propylene glycol  Place 1-2 drops into both eyes as needed.     triamcinolone 55 mcg nasal inhaler  Commonly known as: NASACORT  2 sprays by Nasal route once daily.     VITAMIN D3 ORAL  Take 1 tablet by mouth once daily.     vitamin E 400 UNIT capsule  Take 400 Units by mouth once daily.         * This list has 2 medication(s) that are the same as other medications prescribed for you. Read the directions carefully, and ask your doctor or other care provider to review them with you.            STOP taking these medications    ELIQUIS 5 mg Tab  Generic drug: apixaban            Time spent on the discharge of patient: 31 minutes    Hitesh Varela MD  Cardiology  [facility]

## 2020-12-15 NOTE — PROGRESS NOTES
Plavix prescription delivered to bedside. Discharge papers and med regimen discussed with patient. Granddaughter notified; states that she will pick patient up at front of hospital at 1330. LAC PIV removed at this time. Patient discharged home with all personal belongings.

## 2020-12-15 NOTE — NURSING TRANSFER
Nursing Transfer Note      12/15/2020     Transfer {TRANSFER TO/cath 6    Transfer via stretcher    Transfer with cardiac monitoring    Transported by     Medicines sent: none    Chart send with patient: Yes    Notified: granddaughter    Patient reassessed at: 1215 12/15/2020    Upon arrival to floor: cardiac monitor applied, patient oriented to room, call bell in reach and bed in lowest position

## 2020-12-15 NOTE — H&P
[facility]  Cardiology  History and Physical     Patient Name: Jenniffer Jimenez  MRN: [id number]  Admission Date: 12/15/2020  Code Status: Prior   Attending Provider: Abundio Curtis MD   Primary Care Physician: Rubi Young DO  Principal Problem:<principal problem not specified>    Patient information was obtained from patient, past medical records and ER records.     Subjective:     Chief Complaint:  atrial fibrillation      HPI:  88 F with Meniere's disease, hypercholesterolemia, Chronic diastolic heart failure, Chronic atrial fibrillation, venous insufficiency of both lower extremities and Paroxysmal atrial fibrillation CKD III. She presented for 6 weeks follow up post Watchman implantation. The plan following SONYA post implant if < 5mm nelida-device jet: D/C Eliquis. Continue ASA 81 mg and Start Clopidogrel 75 mg.     Card Meds: apixaban 5 mg, aspirin 81 mg, fluticasone propionate 50 mcg furosemide 20 mg, pravastatin 40 mg.    SONYA Intraoperatively 11/4/2020  · With normal systolic function. The estimated ejection fraction is 55%.  WATCHMAN FLX CLSR 31MM device.  · Normal right ventricular size with normal right ventricular systolic function.  · Severe biatrial enlargement.· Moderate mitral regurgitation.· Mild tricuspid regurgitation.  · Mild pulmonic regurgitation.  · Normal appearing left atrial appendage. No thrombus is present in the appendage.  · 31 mm Watchman FLX successfully implanted, 25% compression.  · Iatrogenic ASD present with left to right shunting indicated by color flow Doppler    Dysphagia or odynophagia:  No  Liver Disease, esophageal disease, or known varices:  No  Upper GI Bleeding: No  Snoring:  Yes  Sleep Apnea:  Yes  Family history of anesthetic difficulties:  No  Last oral intake:  12 hours ago  Able to move neck in all directions:  Yes        Past Medical History:   Diagnosis Date    Amblyopia of left eye 4/10/2013    Anticoagulant long-term  "use     Coumadin - A-Fib    Anxiety     Arthritis     Facet arthropathy, Lumbosacral    Atherosclerosis of aorta     Atrial fibrillation     Auricular fibrillation     Central retinal vein occlusion of left eye     CHF (congestive heart failure)     Chronic kidney disease, stage 3     Coronary artery disease     Depression     Diastolic heart failure 2014    Exotropia of both eyes 2013    recession RSR 5.0mm w/ adj; recession LR os 5.0 w/ adj; resect MR os  4.0mm    Hearing loss     History of resection of small bowel     Hyperlipidemia     Hypertension     Hypertensive retinopathy of both eyes     Hypoglycemia     Macular degeneration     Meniere's disease of both ears     OA (osteoarthritis) of shoulder     Right    Osteoporosis     Other and unspecified hyperlipidemia     Posterior vitreous detachment of both eyes     Rhinitis     TIA (transient ischemic attack)        Past Surgical History:   Procedure Laterality Date    APPENDECTOMY      CARDIAC CATHETERIZATION      CATARACT EXTRACTION W/  INTRAOCULAR LENS IMPLANT Bilateral      SECTION, CLASSIC      CLOSURE OF LEFT ATRIAL APPENDAGE USING DEVICE N/A 2020    Procedure: Left atrial appendage closure device;  Surgeon: Abundio Curtis MD;  Location: [facility] CATH LAB;  Service: Cardiology;  Laterality: N/A;    HYSTERECTOMY      INNER EAR SURGERY      JOINT REPLACEMENT      LEFT KNEE REPLACEMENT IN  -    OOPHORECTOMY      SINUS SURGERY      STRABISMUS SURGERY  13    RSR recession 5 mm, LLR recession 5 mm and LMR resection 4mm    STRABISMUS SURGERY  2014    recess LR OD 6mm    TONSILLECTOMY         Review of patient's allergies indicates:   Allergen Reactions    Bactrim [sulfamethoxazole-trimethoprim] Other (See Comments)     "Couldn't walk"    Opioids - morphine analogues Other (See Comments)     Bowel issues; bowel obstruction    Tizanidine Other (See Comments)     "Lips were numb,  Almost " "passed out."    Tramadol Hallucinations    Beta-blockers (beta-adrenergic blocking agts) Other (See Comments)     Can not go on beta blockers for long period of time - due to taking allergy injections    Phenergan [promethazine] Other (See Comments)     Per pt. request- saw sisters have bad reaction to drug       No current facility-administered medications on file prior to encounter.      Current Outpatient Medications on File Prior to Encounter   Medication Sig    apixaban (ELIQUIS) 5 mg Tab Take 1 tablet (5 mg total) by mouth 2 (two) times daily.    aspirin (ECOTRIN) 81 MG EC tablet Take 1 tablet (81 mg total) by mouth once daily.    cholecalciferol, vitamin D3, (VITAMIN D3 ORAL) Take 1 tablet by mouth once daily.    docusate sodium (COLACE) 100 MG capsule Take 100 mg by mouth once daily.    fluticasone propionate (FLONASE) 50 mcg/actuation nasal spray USE 1 SPRAY IN EACH NOSTRIL ONE TIME DAILY    furosemide (LASIX) 20 MG tablet TAKE 1 TABLET BY MOUTH DAILY; MAY TAKE AN ADDITIONAL TABLET AS NEEDED FOR SWELLING.    multivitamin (ONE DAILY MULTIVITAMIN) per tablet Take 1 tablet by mouth once daily.    potassium chloride (MICRO-K) 10 MEQ CpSR TAKE 1 CAPSULE EVERY DAY    pravastatin (PRAVACHOL) 40 MG tablet Take 1 tablet (40 mg total) by mouth once daily.    triamcinolone (NASACORT) 55 mcg nasal inhaler 2 sprays by Nasal route once daily.    vitamin E 400 UNIT capsule Take 400 Units by mouth once daily.    aspirin (ECOTRIN) 81 MG EC tablet Take 81 mg by mouth once daily.    diclofenac sodium (VOLTAREN) 1 % Gel Apply 2 g topically 3 (three) times daily. Apply to ankle for pain    mupirocin (BACTROBAN) 2 % ointment Apply to affected area 3 times daily (Patient not taking: Reported on 12/15/2020)    peg 400-propylene glycol (SYSTANE) 0.4-0.3 % Drop Place 1-2 drops into both eyes as needed.      Family History     Problem Relation (Age of Onset)    Breast cancer Maternal Aunt    Diabetes Sister, Brother "    Heart disease Sister, Sister    Hypertension Mother, Father    Liver disease Sister    No Known Problems Maternal Uncle, Paternal Aunt, Paternal Uncle, Maternal Grandmother, Maternal Grandfather, Paternal Grandmother, Paternal Grandfather, Daughter, Son, Son, Son        Tobacco Use    Smoking status: Former Smoker     Types: Cigarettes     Quit date: 10/29/1982     Years since quittin.1    Smokeless tobacco: Never Used   Substance and Sexual Activity    Alcohol use: Yes     Alcohol/week: 2.0 standard drinks     Types: 1 Cans of beer, 1 Shots of liquor per week     Comment: socially    Drug use: No    Sexual activity: Never     Review of Systems   Constitution: Positive for malaise/fatigue. Negative for chills and decreased appetite.   HENT: Negative for congestion.    Cardiovascular: Negative for chest pain, irregular heartbeat and leg swelling.   Respiratory: Positive for shortness of breath. Negative for cough.    Endocrine: Negative for cold intolerance and heat intolerance.   Skin: Negative for rash.   Musculoskeletal: Negative for arthritis and back pain.   Gastrointestinal: Negative for abdominal pain, constipation and diarrhea.   Genitourinary: Negative for dysuria and hematuria.   Neurological: Negative for dizziness and headaches.   Psychiatric/Behavioral: Negative for altered mental status.     Objective:     Vital Signs (Most Recent):    Vital Signs (24h Range):  Pulse:  [61-66] 66  SpO2:  [98 %] 98 %  BP: (147-148)/(65-67) 148/65     Weight: 77.6 kg (171 lb)  Body mass index is 27.6 kg/m².            No intake or output data in the 24 hours ending 12/15/20 1017    Lines/Drains/Airways     None                 Physical Exam   Constitutional: She is oriented to person, place, and time. She appears well-nourished. No distress.   HENT:   Head: Normocephalic.   Eyes: Pupils are equal, round, and reactive to light.   Neck: No JVD present. No thyromegaly present.   Cardiovascular: Normal rate.    Murmur heard.  Pulmonary/Chest: Effort normal. No respiratory distress. She has no wheezes. She has no rales.   Abdominal: Soft.   Musculoskeletal:         General: Edema present.   Neurological: She is alert and oriented to person, place, and time.   Vitals reviewed.      Significant Labs: All pertinent lab results from the last 24 hours have been reviewed.    Significant Imaging: Echocardiogram:   2D echo with color flow doppler:   Results for orders placed or performed in visit on 05/19/17   2D echo with color flow doppler   Result Value Ref Range    EF 55 55 - 65    Mitral Valve Regurgitation TRIVIAL TO MILD     Est. PA Systolic Pressure 36.3     Tricuspid Valve Regurgitation MILD     Narrative    Date of Procedure: 05/19/2017        TEST DESCRIPTION       Aorta: The aortic root is normal in size, measuring 3.1 cm at sinotubular junction and 3.0 cm at Sinuses of Valsalva. The proximal ascending aorta is normal in size, measuring 3.0 cm across.     Left Atrium: The left atrial volume index is severely enlarged, measuring 50.62 cc/m2.     Left Ventricle: The left ventricle is normal in size, with an end-diastolic diameter of 5.0 cm, and an end-systolic diameter of 3.5 cm. LV wall thickness is normal, with the septum and the posterior wall each measuring 0.8 cm across. Relative wall   thickness was normal at 0.32, and the LV mass index was 81.4 g/m2 consistent with normal left ventricular mass. There are no regional wall motion abnormalities. Left ventricular systolic function appears normal. Visually estimated ejection fraction is   55-60%. The LV Doppler derived stroke volume equals 42.0 ccs.     Diastolic indices: Mitral inflow pattern reveals fusion of E & A waves. E wave velocity 1.4 m/s, E/A ratio 4.0,  msec., E/e' ratio(sep) 17. Diastolic function is indeterminate.     Right Atrium: The right atrium is upper limit of normal, measuring 6.1 cm in length and 4.2 cm in width in the apical view. The  end-systolic right atrial area is 20.0 cm2.     Right Ventricle: The right ventricle is normal in size measuring 4.0 cm at the base in the apical right ventricle-focused view. Global right ventricular systolic function appears normal. Tricuspid annular plane systolic excursion (TAPSE) is 1.1 cm. The   estimated PA systolic pressure is 36 mmHg.     Aortic Valve:  The aortic valve is mildly sclerotic. The aortic valve is tri-leaflet in structure.     Mitral Valve:  The mitral valve is mildly sclerotic. There is trivial to mild mitral regurgitation. There is mitral annular calcification.     Tricuspid Valve:  There is mild tricuspid regurgitation. Tricuspid valve is normal in structure with normal leaflet mobility.     Pulmonary Valve:  There is trivial pulmonic regurgitation. Pulmonary valve is normal in structure with normal leaflet mobility.     IVC: IVC is enlarged but collapses > 50% with a sniff, suggesting intermediate right atrial pressure of 8 mmHg.     Intracavitary: There is no evidence of pericardial effusion, intracavity mass, thrombi, or vegetation.         CONCLUSIONS     1 - Normal left ventricular systolic function (EF 55-60%).     2 - Normal right ventricular systolic function .     3 - Indeterminate LV diastolic function.     4 - The estimated PA systolic pressure is 36 mmHg.     5 - Trivial to mild mitral regurgitation.     6 - Mild tricuspid regurgitation.     7 - Trivial pulmonic regurgitation.     8 - Intermediate central venous pressure.             This document has been electronically    SIGNED BY: Elo Groves MD On: 05/19/2017 11:29    This document was originally electronically signed on: 05/19/2017 11:06    and Transthoracic echo (TTE) complete (Cupid Only): No results found. However, due to the size of the patient record, not all encounters were searched. Please check Results Review for a complete set of results.    Assessment and Plan:     Abnormality of left atrial appendage  88 F  with Meniere's disease, hypercholesterolemia, Chronic diastolic heart failure, Chronic atrial fibrillation, venous insufficiency of both lower extremities and Paroxysmal atrial fibrillation CKD III. She presented for 6 weeks follow up post Watchman implantation. The plan following SONYA post implant if < 5mm nelida-device jet: D/C Eliquis. Continue ASA 81 mg and Start Clopidogrel 75 mg.     Card Meds: apixaban 5 mg, aspirin 81 mg, fluticasone propionate 50 mcg furosemide 20 mg, pravastatin 40 mg.     1. SONYA for evaluation of Watchman FLX 31 mm   -No absolute contraindications of esophageal stricture, tumor, perforation, laceration,or diverticulum and/or active GI bleed  -The risks, benefits & alternatives of the procedure were explained to the patient.   -The risks of transesophageal echo include but are not limited to:  Dental trauma, esophageal trauma/perforation, bleeding, laryngospasm/brochospasm, aspiration, sore throat/hoarseness, & dislodgement of the endotracheal tube/nasogastric tube (where applicable).    -The risks of moderate sedation include hypotension, respiratory depression, arrhythmias, bronchospasm, & death.    -Informed consent was obtained. The patient is agreeable to proceed with the procedure and all questions and concerns addressed.      VTE Risk Mitigation (From admission, onward)    None          Hitesh Varela MD  Cardiology   [facility]

## 2020-12-15 NOTE — ANESTHESIA PREPROCEDURE EVALUATION
"                                                                                                             12/15/2020  Pre-operative evaluation for Procedure(s) (LRB):  ECHOCARDIOGRAM,TRANSESOPHAGEAL (N/A)    Jenniffer Jimenez is a 88 y.o. female here for SONYA to evaluate post Watchman LAAOD    Patient Active Problem List   Diagnosis    Pure hypercholesterolemia    Pseudophakia, both eyes    Stable central retinal vein occlusion of left eye    Chronic rhinitis    Hearing loss, sensorineural    Hypertension    Arthritis    Exotropia of both eyes    Gait instability    Meniere's disease    Facet arthropathy, lumbosacral    Lumbar spinal stenosis    Hypermetropia of right eye    Chronic atrial fibrillation    Chronic kidney disease, stage III (moderate)    Atherosclerosis of aorta    Left ventricular diastolic dysfunction with preserved systolic function    History of TIA (transient ischemic attack)    Osteoporosis    Primary osteoarthritis of right shoulder    History of strabismus surgery    Current use of long term anticoagulation    Prediabetes    Refractive error    Posterior vitreous detachment, bilateral    Dry eye syndrome    Overweight (BMI 25.0-29.9)    Risk for falls    OAB (overactive bladder)    Venous insufficiency of both lower extremities    BRVO (branch retinal vein occlusion)    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 eye with active choroidal neovascularization    Stage 2 chronic kidney disease    Hypertensive retinopathy of both eyes    Unspecified inflammatory spondylopathy, lumbosacral region    Presence of Watchman left atrial appendage closure device       Review of patient's allergies indicates:   Allergen Reactions    Bactrim [sulfamethoxazole-trimethoprim] Other (See Comments)     "Couldn't walk"    Opioids - morphine analogues Other (See Comments)     Bowel issues; bowel obstruction    Tizanidine Other (See Comments)     "Lips were numb,  " "Almost passed out."    Tramadol Hallucinations    Beta-blockers (beta-adrenergic blocking agts) Other (See Comments)     Can not go on beta blockers for long period of time - due to taking allergy injections    Phenergan [promethazine] Other (See Comments)     Per pt. request- saw sisters have bad reaction to drug       No current facility-administered medications on file prior to encounter.      Current Outpatient Medications on File Prior to Encounter   Medication Sig Dispense Refill    apixaban (ELIQUIS) 5 mg Tab Take 1 tablet (5 mg total) by mouth 2 (two) times daily. 60 tablet 11    aspirin (ECOTRIN) 81 MG EC tablet Take 1 tablet (81 mg total) by mouth once daily. 360 tablet 0    cholecalciferol, vitamin D3, (VITAMIN D3 ORAL) Take 1 tablet by mouth once daily.      docusate sodium (COLACE) 100 MG capsule Take 100 mg by mouth once daily.      fluticasone propionate (FLONASE) 50 mcg/actuation nasal spray USE 1 SPRAY IN EACH NOSTRIL ONE TIME DAILY 32 g 11    furosemide (LASIX) 20 MG tablet TAKE 1 TABLET BY MOUTH DAILY; MAY TAKE AN ADDITIONAL TABLET AS NEEDED FOR SWELLING. 100 tablet 3    multivitamin (ONE DAILY MULTIVITAMIN) per tablet Take 1 tablet by mouth once daily.      potassium chloride (MICRO-K) 10 MEQ CpSR TAKE 1 CAPSULE EVERY DAY 90 capsule 3    pravastatin (PRAVACHOL) 40 MG tablet Take 1 tablet (40 mg total) by mouth once daily. 90 tablet 3    triamcinolone (NASACORT) 55 mcg nasal inhaler 2 sprays by Nasal route once daily. 17 g 0    vitamin E 400 UNIT capsule Take 400 Units by mouth once daily.      aspirin (ECOTRIN) 81 MG EC tablet Take 81 mg by mouth once daily.      diclofenac sodium (VOLTAREN) 1 % Gel Apply 2 g topically 3 (three) times daily. Apply to ankle for pain 1 Tube 0    mupirocin (BACTROBAN) 2 % ointment Apply to affected area 3 times daily (Patient not taking: Reported on 12/15/2020) 22 g 1    peg 400-propylene glycol (SYSTANE) 0.4-0.3 % Drop Place 1-2 drops into both " eyes as needed.          Past Surgical History:   Procedure Laterality Date    APPENDECTOMY      CARDIAC CATHETERIZATION      CATARACT EXTRACTION W/  INTRAOCULAR LENS IMPLANT Bilateral      SECTION, CLASSIC      CLOSURE OF LEFT ATRIAL APPENDAGE USING DEVICE N/A 2020    Procedure: Left atrial appendage closure device;  Surgeon: Abundio Curtis MD;  Location: [facility] CATH LAB;  Service: Cardiology;  Laterality: N/A;    HYSTERECTOMY      INNER EAR SURGERY      JOINT REPLACEMENT      LEFT KNEE REPLACEMENT IN 2013 -    OOPHORECTOMY      SINUS SURGERY      STRABISMUS SURGERY  13    RSR recession 5 mm, LLR recession 5 mm and LMR resection 4mm    STRABISMUS SURGERY  2014    recess LR OD 6mm    TONSILLECTOMY         Social History     Socioeconomic History    Marital status:      Spouse name: Not on file    Number of children: Not on file    Years of education: Not on file    Highest education level: Not on file   Occupational History    Not on file   Social Needs    Financial resource strain: Not very hard    Food insecurity     Worry: Sometimes true     Inability: Sometimes true    Transportation needs     Medical: Yes     Non-medical: Yes   Tobacco Use    Smoking status: Former Smoker     Types: Cigarettes     Quit date: 10/29/1982     Years since quittin.1    Smokeless tobacco: Never Used   Substance and Sexual Activity    Alcohol use: Yes     Alcohol/week: 2.0 standard drinks     Types: 1 Cans of beer, 1 Shots of liquor per week     Comment: socially    Drug use: No    Sexual activity: Never   Lifestyle    Physical activity     Days per week: 7 days     Minutes per session: 20 min    Stress: Only a little   Relationships    Social connections     Talks on phone: More than three times a week     Gets together: More than three times a week     Attends Jain service: Not on file     Active member of club or organization: Not on file     Attends meetings of  clubs or organizations: Not on file     Relationship status:    Other Topics Concern    Patient feels they ought to cut down on drinking/drug use Not Asked    Patient annoyed by others criticizing their drinking/drug use Not Asked    Patient has felt bad or guilty about drinking/drug use Not Asked    Patient has had a drink/used drugs as an eye opener in the AM Not Asked    Are you pregnant or think you may be? No    Breast-feeding No   Social History Narrative    Not on file         2D Echo:  Results for orders placed or performed in visit on 05/19/17   2D echo with color flow doppler   Result Value Ref Range    EF 55 55 - 65    Mitral Valve Regurgitation TRIVIAL TO MILD     Est. PA Systolic Pressure 36.3     Tricuspid Valve Regurgitation MILD          Anesthesia Evaluation    I have reviewed the Patient Summary Reports.    I have reviewed the Nursing Notes. I have reviewed the NPO Status.      Review of Systems  Anesthesia Hx:  No problems with previous Anesthesia    Cardiovascular:   Hypertension CAD   CHF    Pulmonary:  Pulmonary Normal    Renal/:   Chronic Renal Disease, CRI    Hepatic/GI:  Hepatic/GI Normal    Musculoskeletal:   Arthritis     Neurological:   TIA,        Physical Exam  General:  Well nourished    Airway/Jaw/Neck:  Airway Findings: Mouth Opening: Normal Tongue: Normal  General Airway Assessment: Adult  Mallampati: II  TM Distance: Normal, at least 6 cm       Chest/Lungs:  Chest/Lungs Findings: Normal Respiratory Rate     Heart/Vascular:  Heart Findings: Rate: Normal             Anesthesia Plan  Type of Anesthesia, risks & benefits discussed:  Anesthesia Type:  general, MAC  Patient's Preference:   Intra-op Monitoring Plan: standard ASA monitors  Intra-op Monitoring Plan Comments:   Post Op Pain Control Plan: IV/PO Opioids PRN and multimodal analgesia  Post Op Pain Control Plan Comments:   Induction:   IV  Beta Blocker:         Informed Consent: Patient understands risks and  agrees with Anesthesia plan.  Questions answered. Anesthesia consent signed with patient.  ASA Score: 3     Day of Surgery Review of History & Physical:            Ready For Surgery From Anesthesia Perspective.

## 2020-12-17 ENCOUNTER — OFFICE VISIT (OUTPATIENT)
Dept: CARDIOLOGY | Facility: CLINIC | Age: 85
End: 2020-12-17
Payer: MEDICARE

## 2020-12-17 VITALS
DIASTOLIC BLOOD PRESSURE: 60 MMHG | HEART RATE: 80 BPM | WEIGHT: 170.63 LBS | HEIGHT: 66 IN | SYSTOLIC BLOOD PRESSURE: 116 MMHG | BODY MASS INDEX: 27.42 KG/M2

## 2020-12-17 DIAGNOSIS — Z95.818 PRESENCE OF WATCHMAN LEFT ATRIAL APPENDAGE CLOSURE DEVICE: ICD-10-CM

## 2020-12-17 DIAGNOSIS — Z79.02 ENCOUNTER FOR LONG-TERM (CURRENT) USE OF ANTIPLATELETS/ANTITHROMBOTICS: ICD-10-CM

## 2020-12-17 DIAGNOSIS — I50.32 CHRONIC DIASTOLIC HEART FAILURE: ICD-10-CM

## 2020-12-17 DIAGNOSIS — E78.00 PURE HYPERCHOLESTEROLEMIA: ICD-10-CM

## 2020-12-17 DIAGNOSIS — I48.20 CHRONIC ATRIAL FIBRILLATION: Primary | ICD-10-CM

## 2020-12-17 DIAGNOSIS — Z86.73 HISTORY OF TIA (TRANSIENT ISCHEMIC ATTACK): ICD-10-CM

## 2020-12-17 DIAGNOSIS — E66.3 OVERWEIGHT (BMI 25.0-29.9): ICD-10-CM

## 2020-12-17 DIAGNOSIS — I10 ESSENTIAL HYPERTENSION: Chronic | ICD-10-CM

## 2020-12-17 DIAGNOSIS — D64.9 NORMOCYTIC ANEMIA: ICD-10-CM

## 2020-12-17 DIAGNOSIS — N18.31 STAGE 3A CHRONIC KIDNEY DISEASE: ICD-10-CM

## 2020-12-17 PROBLEM — N18.2 STAGE 2 CHRONIC KIDNEY DISEASE: Status: RESOLVED | Noted: 2020-10-06 | Resolved: 2020-12-17

## 2020-12-17 PROCEDURE — 99499 UNLISTED E&M SERVICE: CPT | Mod: S$GLB,,, | Performed by: NURSE PRACTITIONER

## 2020-12-17 PROCEDURE — 1101F PR PT FALLS ASSESS DOC 0-1 FALLS W/OUT INJ PAST YR: ICD-10-PCS | Mod: HCNC,CPTII,S$GLB, | Performed by: NURSE PRACTITIONER

## 2020-12-17 PROCEDURE — 1159F MED LIST DOCD IN RCRD: CPT | Mod: HCNC,S$GLB,, | Performed by: NURSE PRACTITIONER

## 2020-12-17 PROCEDURE — 1159F PR MEDICATION LIST DOCUMENTED IN MEDICAL RECORD: ICD-10-PCS | Mod: HCNC,S$GLB,, | Performed by: NURSE PRACTITIONER

## 2020-12-17 PROCEDURE — 1101F PT FALLS ASSESS-DOCD LE1/YR: CPT | Mod: HCNC,CPTII,S$GLB, | Performed by: NURSE PRACTITIONER

## 2020-12-17 PROCEDURE — 99214 PR OFFICE/OUTPT VISIT, EST, LEVL IV, 30-39 MIN: ICD-10-PCS | Mod: HCNC,S$GLB,, | Performed by: NURSE PRACTITIONER

## 2020-12-17 PROCEDURE — 3288F FALL RISK ASSESSMENT DOCD: CPT | Mod: HCNC,CPTII,S$GLB, | Performed by: NURSE PRACTITIONER

## 2020-12-17 PROCEDURE — 99214 OFFICE O/P EST MOD 30 MIN: CPT | Mod: HCNC,S$GLB,, | Performed by: NURSE PRACTITIONER

## 2020-12-17 PROCEDURE — 1126F AMNT PAIN NOTED NONE PRSNT: CPT | Mod: HCNC,S$GLB,, | Performed by: NURSE PRACTITIONER

## 2020-12-17 PROCEDURE — 99999 PR PBB SHADOW E&M-EST. PATIENT-LVL IV: ICD-10-PCS | Mod: PBBFAC,HCNC,, | Performed by: NURSE PRACTITIONER

## 2020-12-17 PROCEDURE — 99499 RISK ADDL DX/OHS AUDIT: ICD-10-PCS | Mod: S$GLB,,, | Performed by: NURSE PRACTITIONER

## 2020-12-17 PROCEDURE — 99999 PR PBB SHADOW E&M-EST. PATIENT-LVL IV: CPT | Mod: PBBFAC,HCNC,, | Performed by: NURSE PRACTITIONER

## 2020-12-17 PROCEDURE — 3288F PR FALLS RISK ASSESSMENT DOCUMENTED: ICD-10-PCS | Mod: HCNC,CPTII,S$GLB, | Performed by: NURSE PRACTITIONER

## 2020-12-17 PROCEDURE — 1126F PR PAIN SEVERITY QUANTIFIED, NO PAIN PRESENT: ICD-10-PCS | Mod: HCNC,S$GLB,, | Performed by: NURSE PRACTITIONER

## 2020-12-17 RX ORDER — POTASSIUM CHLORIDE 750 MG/1
10 CAPSULE, EXTENDED RELEASE ORAL DAILY PRN
Qty: 90 CAPSULE | Refills: 3 | Status: SHIPPED | OUTPATIENT
Start: 2020-12-17 | End: 2021-02-25

## 2020-12-17 NOTE — PROGRESS NOTES
Ms. Jimenez is a patient of Dr. Saenz and was last seen in [facility] Cardiology Visit 6/16/20.      Subjective:   Patient ID:  Jenniffer Jimenez is a 88 y.o. female who presents for follow-up of Atrial Fibrillation    Problem List:  Atrial fib - chronic 1/16  -s/p watchman 11/4/20; repeat SONYA on 12/15 with successful occlusion of FERNANDA  HTN  Hypercholesterolemia  TIA 1997 and then transient vision loss ~2005  CHF diastolic     HPI:   Jenniffer Jimenez is in clinic today for routine follow up.  She had sucessful implantation of the watchman on 11/4 and stopped anticoagulation therapy on 12/15 after SONYA was done to confirm FERNANDA occlusion.  Patient is taking DAPT since 12/15.  Denies bleeding gums, epistaxis, hemoptysis, hematuria, and hematochezia.  She is chronically anemic but it is stable.  She is riding the stationary bike about 30 minutes daily.  Patient denies chest pain with exertion or at rest, palpitations, SOB, MENDIETA, dizziness, syncope, edema, orthopnea, PND, or claudication.  Her weight is up 10 pounds over the last month by clinic weights.      Review of Systems   Constitution: Positive for weight gain. Negative for decreased appetite, diaphoresis, malaise/fatigue and weight loss.   Eyes: Negative for visual disturbance.   Cardiovascular: Positive for leg swelling. Negative for chest pain, claudication, dyspnea on exertion, irregular heartbeat, near-syncope, orthopnea, palpitations, paroxysmal nocturnal dyspnea and syncope.        Denies chest pressure   Respiratory: Negative for cough, hemoptysis, shortness of breath, sleep disturbances due to breathing and snoring.    Endocrine: Negative for cold intolerance and heat intolerance.   Hematologic/Lymphatic: Negative for bleeding problem. Does not bruise/bleed easily.   Musculoskeletal: Negative for myalgias.   Gastrointestinal: Negative for bloating, abdominal pain, anorexia, change in bowel habit, constipation, diarrhea, nausea and vomiting.   Neurological:  "Negative for difficulty with concentration, disturbances in coordination, excessive daytime sleepiness, dizziness, headaches, light-headedness, loss of balance, numbness and weakness.   Psychiatric/Behavioral: The patient does not have insomnia.        Allergies and current medications updated and reviewed:  Review of patient's allergies indicates:   Allergen Reactions    Bactrim [sulfamethoxazole-trimethoprim] Other (See Comments)     "Couldn't walk"    Opioids - morphine analogues Other (See Comments)     Bowel issues; bowel obstruction    Tizanidine Other (See Comments)     "Lips were numb,  Almost passed out."    Tramadol Hallucinations    Beta-blockers (beta-adrenergic blocking agts) Other (See Comments)     Can not go on beta blockers for long period of time - due to taking allergy injections    Phenergan [promethazine] Other (See Comments)     Per pt. request- saw sisters have bad reaction to drug     Current Outpatient Medications   Medication Sig    amoxicillin (AMOXIL) 500 MG capsule Take 4 capsule by mouth 1 hour prior dental appointment    aspirin (ECOTRIN) 81 MG EC tablet Take 1 tablet (81 mg total) by mouth once daily.    cholecalciferol, vitamin D3, (VITAMIN D3 ORAL) Take 1 tablet by mouth once daily.    clopidogreL (PLAVIX) 75 mg tablet Take 1 tablet (75 mg total) by mouth once daily.    docusate sodium (COLACE) 100 MG capsule Take 100 mg by mouth once daily.    fluticasone propionate (FLONASE) 50 mcg/actuation nasal spray USE 1 SPRAY IN EACH NOSTRIL ONE TIME DAILY    furosemide (LASIX) 20 MG tablet TAKE 1 TABLET BY MOUTH DAILY; MAY TAKE AN ADDITIONAL TABLET AS NEEDED FOR SWELLING.    multivitamin (ONE DAILY MULTIVITAMIN) per tablet Take 1 tablet by mouth once daily.    peg 400-propylene glycol (SYSTANE) 0.4-0.3 % Drop Place 1-2 drops into both eyes as needed.     potassium chloride (MICRO-K) 10 MEQ CpSR Take 1 capsule (10 mEq total) by mouth daily as needed (on days that you take the " "furosemide).    pravastatin (PRAVACHOL) 40 MG tablet Take 1 tablet (40 mg total) by mouth once daily.    triamcinolone (NASACORT) 55 mcg nasal inhaler 2 sprays by Nasal route once daily.    UNABLE TO FIND Apply topically. Fluconazole/Vancomycin/Ibuprofen 2/2/2% W/V DMSO Nail Polish [40591]    vitamin E 400 UNIT capsule Take 400 Units by mouth once daily.     No current facility-administered medications for this visit.        Objective:        /60   Pulse 80   Ht 5' 6" (1.676 m)   Wt 77.4 kg (170 lb 10.2 oz)   LMP  (LMP Unknown)   BMI 27.54 kg/m²       Physical Exam   Constitutional: She is oriented to person, place, and time. Vital signs are normal. She appears well-developed and well-nourished. She is active. No distress.   HENT:   Head: Normocephalic and atraumatic.   Eyes: Conjunctivae and lids are normal. No scleral icterus.   Neck: Neck supple. Hepatojugular reflux present. Normal carotid pulses and no JVD present. Carotid bruit is not present.   Cardiovascular: Normal rate, S1 normal, S2 normal and intact distal pulses. An irregularly irregular rhythm present. PMI is not displaced. Exam reveals no gallop and no friction rub.   No murmur heard.  Pulses:       Carotid pulses are 2+ on the right side and 2+ on the left side.       Radial pulses are 2+ on the right side and 2+ on the left side.        Dorsalis pedis pulses are 2+ on the right side and 2+ on the left side.        Posterior tibial pulses are 1+ on the right side and 1+ on the left side.   Pulmonary/Chest: Effort normal and breath sounds normal. No respiratory distress. She has no decreased breath sounds. She has no wheezes. She has no rhonchi. She has no rales. She exhibits no tenderness.   Abdominal: Soft. Normal appearance and bowel sounds are normal. She exhibits no distension, no fluid wave, no abdominal bruit, no ascites and no pulsatile midline mass. There is no hepatosplenomegaly. There is no abdominal tenderness. "   Musculoskeletal:         General: Edema (BLE +1 pitting R>L; trace pitting presacral) present.   Neurological: She is alert and oriented to person, place, and time. Gait normal.   Skin: Skin is warm, dry and intact. No rash noted. She is not diaphoretic. Nails show no clubbing.   Psychiatric: She has a normal mood and affect. Her speech is normal and behavior is normal. Judgment and thought content normal. Cognition and memory are normal.   Nursing note and vitals reviewed.      Chemistry        Component Value Date/Time     12/08/2020 0730    K 4.2 12/08/2020 0730     12/08/2020 0730    CO2 29 12/08/2020 0730    BUN 19 12/08/2020 0730    CREATININE 0.9 12/08/2020 0730    GLU 98 12/08/2020 0730        Component Value Date/Time    CALCIUM 8.8 12/08/2020 0730    ALKPHOS 121 12/08/2020 0730    AST 16 12/08/2020 0730    ALT 11 12/08/2020 0730    BILITOT 0.6 12/08/2020 0730    ESTGFRAFRICA >60.0 12/08/2020 0730    EGFRNONAA 57.3 (A) 12/08/2020 0730        Lab Results   Component Value Date    HGBA1C 5.6 06/25/2020     Recent Labs   Lab 01/24/18  0739  01/01/20  2319  06/25/20  2145  12/08/20  0730   WBC 4.75   < > 5.95   < >  --    < > 3.17 L   Hemoglobin 12.4   < > 11.6 L   < >  --    < > 10.1 L   Hematocrit 37.6   < > 34.7 L   < >  --    < > 31.7 L   MCV 89   < > 90   < >  --    < > 94   Platelets 258   < > 208   < >  --    < > 163    H  --  178 H  --   --   --   --    TSH 3.998   < >  --   --  2.834  --   --    Cholesterol 224 H   < >  --    < > 206 H  --  164   HDL 82 H   < >  --    < > 74  --  64   LDL Cholesterol 117.8   < >  --    < > 114.2  --  86.6   Triglycerides 121   < >  --    < > 89  --  67   HDL/Cholesterol Ratio 36.6   < >  --    < > 35.9  --  39.0    < > = values in this interval not displayed.       Recent Labs   Lab 07/20/20  0750 07/30/20  1318 08/05/20  1016 10/06/20  1003   INR 2.9 H 2.4 H 1.3 H 1.0        Test(s) Reviewed  I have reviewed the following in detail:  [] Stress  test   [] Angiography   [x] Echocardiogram   [x] Labs   [] Other:         Assessment/Plan:   1. Chronic atrial fibrillation      2. Presence of Watchman left atrial appendage closure device      3. Encounter for long-term (current) use of antiplatelets/antithrombotics  Denies bleeding.  Discussed when to seek immediate medical attention.       4. History of TIA (transient ischemic attack)      5. Chronic diastolic heart failure  Wt up 10 pounds over the last month.  Mildly hypervolemic on exam.  Increase furosemide to BID for 5 days and bmp in 2 weeks.  Requested that she call Monday to let us know how her weight and her leg swelling are.     - potassium chloride (MICRO-K) 10 MEQ CpSR; Take 1 capsule (10 mEq total) by mouth daily as needed (on days that you take the furosemide).  Dispense: 90 capsule; Refill: 3    6. Essential hypertension  BP at goal <130/80. Continue current regimen.      7. Pure hypercholesterolemia  LDL at goal <100. No changes      8. Overweight (BMI 25.0-29.9)  BMI 27.5 Encouraged CV exercise to 30 minutes a day for 5 days a week.       9. Stage 3a chronic kidney disease      10.  Normocytic anemia  Stable      A copy of this note will be forwarded to Dr. Saenz and Dr. Hernandez    Follow up in about 6 months (around 6/17/2021), or if symptoms worsen or fail to improve.

## 2020-12-17 NOTE — PATIENT INSTRUCTIONS
Take your fluid pill in the afternoon 6 hours after your morning dose for 5 days.  You can stop the afternoon dose sooner than 5 days if you lose the 5-7 pounds that you have gained at home.     Call me on Monday and let me know how your weight is and your leg swelling.

## 2020-12-18 ENCOUNTER — CLINICAL SUPPORT (OUTPATIENT)
Dept: INTERNAL MEDICINE | Facility: CLINIC | Age: 85
End: 2020-12-18
Payer: MEDICARE

## 2020-12-18 DIAGNOSIS — Z51.6 ALLERGY DESENSITIZATION THERAPY: ICD-10-CM

## 2020-12-18 PROCEDURE — 99499 NO LOS: ICD-10-PCS | Mod: HCNC,S$GLB,, | Performed by: ALLERGY & IMMUNOLOGY

## 2020-12-18 PROCEDURE — 95115 PR IMMUNOTHERAPY, ONE INJECTION: ICD-10-PCS | Mod: HCNC,S$GLB,, | Performed by: FAMILY MEDICINE

## 2020-12-18 PROCEDURE — 95115 IMMUNOTHERAPY ONE INJECTION: CPT | Mod: HCNC,S$GLB,, | Performed by: FAMILY MEDICINE

## 2020-12-18 PROCEDURE — 99499 UNLISTED E&M SERVICE: CPT | Mod: HCNC,S$GLB,, | Performed by: ALLERGY & IMMUNOLOGY

## 2020-12-22 ENCOUNTER — PROCEDURE VISIT (OUTPATIENT)
Dept: OPHTHALMOLOGY | Facility: CLINIC | Age: 85
End: 2020-12-22
Payer: MEDICARE

## 2020-12-22 DIAGNOSIS — H43.813 POSTERIOR VITREOUS DETACHMENT, BILATERAL: ICD-10-CM

## 2020-12-22 DIAGNOSIS — H35.033 HYPERTENSIVE RETINOPATHY OF BOTH EYES: ICD-10-CM

## 2020-12-22 DIAGNOSIS — H35.3221 EXUDATIVE AGE-RELATED MACULAR DEGENERATION OF LEFT EYE WITH ACTIVE CHOROIDAL NEOVASCULARIZATION: Primary | ICD-10-CM

## 2020-12-22 PROCEDURE — 92012 PR EYE EXAM, EST PATIENT,INTERMED: ICD-10-PCS | Mod: 25,HCNC,S$GLB, | Performed by: OPHTHALMOLOGY

## 2020-12-22 PROCEDURE — 92134 CPTRZ OPH DX IMG PST SGM RTA: CPT | Mod: HCNC,S$GLB,, | Performed by: OPHTHALMOLOGY

## 2020-12-22 PROCEDURE — 67028 INJECTION EYE DRUG: CPT | Mod: HCNC,LT,S$GLB, | Performed by: OPHTHALMOLOGY

## 2020-12-22 PROCEDURE — 67028 PR INJECT INTRAVITREAL PHARMCOLOGIC: ICD-10-PCS | Mod: HCNC,LT,S$GLB, | Performed by: OPHTHALMOLOGY

## 2020-12-22 PROCEDURE — 92012 INTRM OPH EXAM EST PATIENT: CPT | Mod: 25,HCNC,S$GLB, | Performed by: OPHTHALMOLOGY

## 2020-12-22 PROCEDURE — 92134 POSTERIOR SEGMENT OCT RETINA (OCULAR COHERENCE TOMOGRAPHY)-BOTH EYES: ICD-10-PCS | Mod: HCNC,S$GLB,, | Performed by: OPHTHALMOLOGY

## 2020-12-22 RX ADMIN — Medication 1.25 MG: at 02:12

## 2020-12-23 NOTE — PATIENT INSTRUCTIONS
POST INTRAVITREAL INJECTION PATIENT INSTRUCTIONS   Below are some guidelines for what to expect after your treatment and additional care instructions.   * you may experience mild discomfort in your eye after the treatment. Your eye usually feels better within 24 to 48 hours after the procedure.   * you have been given drops that numb the surface of your eye.   DO NOT rub or touch your eye, DO NOT wear contacts until numbness goes away.   * you may experience small spots that appear in your field of vision, these are usually caused by an air bubble or from the medication. It taked a few hours or days for these to go away.   * use of sunglasses will help reduce light sensitivity and glare.   * DO NOT swim   for at least 3 hours after the injection   * If you have a gritty, burning, irritating or stinging feeling in the injected eye. This may be a result of the antiseptic used. Use artifical tears or eye lubricant ( from over- the- counter from your local pharmacy ). If you have some at home already please check the expiration date, so not to use anything contaminated in your eye. A cool pack over your eye brow above the injected eye may also relieve discomfort.   Call us right away or go to the Emergency Department if you have a dramatic decrease in vision or extreme pain in the eye.   [facility] [phone]

## 2020-12-28 ENCOUNTER — TELEPHONE (OUTPATIENT)
Dept: INTERNAL MEDICINE | Facility: CLINIC | Age: 85
End: 2020-12-28

## 2020-12-28 NOTE — TELEPHONE ENCOUNTER
----- Message from Suzy Moore sent at 12/28/2020 12:05 PM CST -----  Contact: Self [phone]  Would like to get medical advice.    Comments:  Calling to speak with the nurse regarding the covid vaccine once available. Patient  states she have questions and requesting a call back.

## 2020-12-28 NOTE — TELEPHONE ENCOUNTER
"Spoke with pt to advise:    "[facility] tentatively has plans to begin vaccinating patients beginning in the Spring.  We also know that vaccines will not be given here at our local clinics.    You will be notified via text message, media, and/or My Chart when the vaccine is available for your age and risk group.     I'm sorry, I wish I could have been of more help.    Thanks."    Verbalized understanding.    "

## 2020-12-29 ENCOUNTER — TELEPHONE (OUTPATIENT)
Dept: ADMINISTRATIVE | Facility: CLINIC | Age: 85
End: 2020-12-29

## 2020-12-30 ENCOUNTER — DOCUMENT SCAN (OUTPATIENT)
Dept: HOME HEALTH SERVICES | Facility: HOSPITAL | Age: 85
End: 2020-12-30
Payer: MEDICARE

## 2020-12-30 ENCOUNTER — LAB VISIT (OUTPATIENT)
Dept: LAB | Facility: HOSPITAL | Age: 85
End: 2020-12-30
Attending: NURSE PRACTITIONER
Payer: COMMERCIAL

## 2020-12-30 DIAGNOSIS — I50.32 CHRONIC DIASTOLIC HEART FAILURE: ICD-10-CM

## 2020-12-30 LAB
ANION GAP SERPL CALC-SCNC: 10 MMOL/L (ref 8–16)
BUN SERPL-MCNC: 25 MG/DL (ref 8–23)
CALCIUM SERPL-MCNC: 8.9 MG/DL (ref 8.7–10.5)
CHLORIDE SERPL-SCNC: 103 MMOL/L (ref 95–110)
CO2 SERPL-SCNC: 27 MMOL/L (ref 23–29)
CREAT SERPL-MCNC: 1 MG/DL (ref 0.5–1.4)
EST. GFR  (AFRICAN AMERICAN): 58.1 ML/MIN/1.73 M^2
EST. GFR  (NON AFRICAN AMERICAN): 50.4 ML/MIN/1.73 M^2
GLUCOSE SERPL-MCNC: 99 MG/DL (ref 70–110)
POTASSIUM SERPL-SCNC: 4.2 MMOL/L (ref 3.5–5.1)
SODIUM SERPL-SCNC: 140 MMOL/L (ref 136–145)

## 2020-12-30 PROCEDURE — 36415 COLL VENOUS BLD VENIPUNCTURE: CPT | Mod: HCNC,PO

## 2020-12-30 PROCEDURE — 80048 BASIC METABOLIC PNL TOTAL CA: CPT | Mod: HCNC

## 2021-01-07 ENCOUNTER — EXTERNAL HOME HEALTH (OUTPATIENT)
Dept: HOME HEALTH SERVICES | Facility: HOSPITAL | Age: 86
End: 2021-01-07
Payer: MEDICARE

## 2021-01-11 ENCOUNTER — TELEPHONE (OUTPATIENT)
Dept: ALLERGY | Facility: CLINIC | Age: 86
End: 2021-01-11

## 2021-01-14 ENCOUNTER — IMMUNIZATION (OUTPATIENT)
Dept: INTERNAL MEDICINE | Facility: CLINIC | Age: 86
End: 2021-01-14
Payer: MEDICARE

## 2021-01-14 DIAGNOSIS — Z23 NEED FOR VACCINATION: ICD-10-CM

## 2021-01-14 PROCEDURE — 91300 COVID-19, MRNA, LNP-S, PF, 30 MCG/0.3 ML DOSE VACCINE: CPT | Mod: PBBFAC | Performed by: INTERNAL MEDICINE

## 2021-01-15 ENCOUNTER — DOCUMENT SCAN (OUTPATIENT)
Dept: HOME HEALTH SERVICES | Facility: HOSPITAL | Age: 86
End: 2021-01-15
Payer: MEDICARE

## 2021-01-15 PROCEDURE — G0179 MD RECERTIFICATION HHA PT: HCPCS | Mod: ,,, | Performed by: INTERNAL MEDICINE

## 2021-01-15 PROCEDURE — G0179 PR HOME HEALTH MD RECERTIFICATION: ICD-10-PCS | Mod: ,,, | Performed by: INTERNAL MEDICINE

## 2021-01-25 ENCOUNTER — CLINICAL SUPPORT (OUTPATIENT)
Dept: INTERNAL MEDICINE | Facility: CLINIC | Age: 86
End: 2021-01-25
Payer: MEDICARE

## 2021-01-25 DIAGNOSIS — J31.0 CHRONIC RHINITIS: ICD-10-CM

## 2021-01-25 PROCEDURE — 99499 NO LOS: ICD-10-PCS | Mod: HCNC,S$GLB,, | Performed by: ALLERGY & IMMUNOLOGY

## 2021-01-25 PROCEDURE — 95115 PR IMMUNOTHERAPY, ONE INJECTION: ICD-10-PCS | Mod: HCNC,S$GLB,, | Performed by: FAMILY MEDICINE

## 2021-01-25 PROCEDURE — 99499 UNLISTED E&M SERVICE: CPT | Mod: HCNC,S$GLB,, | Performed by: ALLERGY & IMMUNOLOGY

## 2021-01-25 PROCEDURE — 95115 IMMUNOTHERAPY ONE INJECTION: CPT | Mod: HCNC,S$GLB,, | Performed by: FAMILY MEDICINE

## 2021-01-28 ENCOUNTER — OFFICE VISIT (OUTPATIENT)
Dept: PODIATRY | Facility: CLINIC | Age: 86
End: 2021-01-28
Payer: MEDICARE

## 2021-01-28 VITALS
HEIGHT: 66 IN | HEART RATE: 57 BPM | RESPIRATION RATE: 18 BRPM | BODY MASS INDEX: 27.32 KG/M2 | WEIGHT: 170 LBS | SYSTOLIC BLOOD PRESSURE: 120 MMHG | DIASTOLIC BLOOD PRESSURE: 62 MMHG

## 2021-01-28 DIAGNOSIS — G60.9 IDIOPATHIC PERIPHERAL NEUROPATHY: Primary | ICD-10-CM

## 2021-01-28 DIAGNOSIS — B35.1 ONYCHOMYCOSIS DUE TO DERMATOPHYTE: ICD-10-CM

## 2021-01-28 PROCEDURE — 1126F AMNT PAIN NOTED NONE PRSNT: CPT | Mod: S$GLB,,, | Performed by: PODIATRIST

## 2021-01-28 PROCEDURE — 11721 PR DEBRIDEMENT OF NAILS, 6 OR MORE: ICD-10-PCS | Mod: Q9,S$GLB,, | Performed by: PODIATRIST

## 2021-01-28 PROCEDURE — 99999 PR PBB SHADOW E&M-EST. PATIENT-LVL III: ICD-10-PCS | Mod: PBBFAC,,, | Performed by: PODIATRIST

## 2021-01-28 PROCEDURE — 11721 DEBRIDE NAIL 6 OR MORE: CPT | Mod: Q9,S$GLB,, | Performed by: PODIATRIST

## 2021-01-28 PROCEDURE — 99499 UNLISTED E&M SERVICE: CPT | Mod: S$GLB,,, | Performed by: PODIATRIST

## 2021-01-28 PROCEDURE — 1126F PR PAIN SEVERITY QUANTIFIED, NO PAIN PRESENT: ICD-10-PCS | Mod: S$GLB,,, | Performed by: PODIATRIST

## 2021-01-28 PROCEDURE — 99499 NO LOS: ICD-10-PCS | Mod: S$GLB,,, | Performed by: PODIATRIST

## 2021-01-28 PROCEDURE — 99999 PR PBB SHADOW E&M-EST. PATIENT-LVL III: CPT | Mod: PBBFAC,,, | Performed by: PODIATRIST

## 2021-02-05 ENCOUNTER — IMMUNIZATION (OUTPATIENT)
Dept: INTERNAL MEDICINE | Facility: CLINIC | Age: 86
End: 2021-02-05
Payer: MEDICARE

## 2021-02-05 DIAGNOSIS — Z23 NEED FOR VACCINATION: Primary | ICD-10-CM

## 2021-02-05 PROCEDURE — 0002A COVID-19, MRNA, LNP-S, PF, 30 MCG/0.3 ML DOSE VACCINE: CPT | Mod: PBBFAC | Performed by: INTERNAL MEDICINE

## 2021-02-05 PROCEDURE — 91300 COVID-19, MRNA, LNP-S, PF, 30 MCG/0.3 ML DOSE VACCINE: CPT | Mod: PBBFAC | Performed by: INTERNAL MEDICINE

## 2021-02-11 ENCOUNTER — HOSPITAL ENCOUNTER (OUTPATIENT)
Dept: RADIOLOGY | Facility: HOSPITAL | Age: 86
Discharge: HOME | End: 2021-02-11
Attending: INTERNAL MEDICINE
Payer: MEDICARE

## 2021-02-11 ENCOUNTER — OFFICE VISIT (OUTPATIENT)
Dept: CARDIOLOGY | Facility: CLINIC | Age: 86
End: 2021-02-11
Payer: MEDICARE

## 2021-02-11 VITALS
DIASTOLIC BLOOD PRESSURE: 65 MMHG | HEART RATE: 75 BPM | SYSTOLIC BLOOD PRESSURE: 141 MMHG | BODY MASS INDEX: 28.95 KG/M2 | WEIGHT: 173.75 LBS | HEIGHT: 65 IN

## 2021-02-11 DIAGNOSIS — Z95.818 PRESENCE OF WATCHMAN LEFT ATRIAL APPENDAGE CLOSURE DEVICE: ICD-10-CM

## 2021-02-11 DIAGNOSIS — I50.32 CHRONIC DIASTOLIC HEART FAILURE: ICD-10-CM

## 2021-02-11 DIAGNOSIS — N18.31 STAGE 3A CHRONIC KIDNEY DISEASE: ICD-10-CM

## 2021-02-11 DIAGNOSIS — E78.00 PURE HYPERCHOLESTEROLEMIA: ICD-10-CM

## 2021-02-11 DIAGNOSIS — Q20.8 ABNORMALITY OF LEFT ATRIAL APPENDAGE: ICD-10-CM

## 2021-02-11 DIAGNOSIS — I48.20 CHRONIC ATRIAL FIBRILLATION: ICD-10-CM

## 2021-02-11 DIAGNOSIS — I10 ESSENTIAL HYPERTENSION: Chronic | ICD-10-CM

## 2021-02-11 DIAGNOSIS — Z79.02 ENCOUNTER FOR LONG-TERM (CURRENT) USE OF ANTIPLATELETS/ANTITHROMBOTICS: ICD-10-CM

## 2021-02-11 DIAGNOSIS — R07.9 CHEST PAIN, UNSPECIFIED TYPE: Primary | ICD-10-CM

## 2021-02-11 DIAGNOSIS — Z86.73 HISTORY OF TIA (TRANSIENT ISCHEMIC ATTACK): ICD-10-CM

## 2021-02-11 PROCEDURE — 99999 PR PBB SHADOW E&M-EST. PATIENT-LVL IV: CPT | Mod: PBBFAC,,, | Performed by: INTERNAL MEDICINE

## 2021-02-11 PROCEDURE — 3288F FALL RISK ASSESSMENT DOCD: CPT | Mod: CPTII,S$GLB,, | Performed by: INTERNAL MEDICINE

## 2021-02-11 PROCEDURE — 1101F PR PT FALLS ASSESS DOC 0-1 FALLS W/OUT INJ PAST YR: ICD-10-PCS | Mod: CPTII,S$GLB,, | Performed by: INTERNAL MEDICINE

## 2021-02-11 PROCEDURE — 99214 PR OFFICE/OUTPT VISIT, EST, LEVL IV, 30-39 MIN: ICD-10-PCS | Mod: 25,S$GLB,, | Performed by: INTERNAL MEDICINE

## 2021-02-11 PROCEDURE — 71046 X-RAY EXAM CHEST 2 VIEWS: CPT | Mod: TC,PO

## 2021-02-11 PROCEDURE — 1159F PR MEDICATION LIST DOCUMENTED IN MEDICAL RECORD: ICD-10-PCS | Mod: S$GLB,,, | Performed by: INTERNAL MEDICINE

## 2021-02-11 PROCEDURE — 99214 OFFICE O/P EST MOD 30 MIN: CPT | Mod: 25,S$GLB,, | Performed by: INTERNAL MEDICINE

## 2021-02-11 PROCEDURE — 71046 XR CHEST PA AND LATERAL: ICD-10-PCS | Mod: 26,,, | Performed by: RADIOLOGY

## 2021-02-11 PROCEDURE — 93000 EKG 12-LEAD: ICD-10-PCS | Mod: S$GLB,,, | Performed by: INTERNAL MEDICINE

## 2021-02-11 PROCEDURE — 1159F MED LIST DOCD IN RCRD: CPT | Mod: S$GLB,,, | Performed by: INTERNAL MEDICINE

## 2021-02-11 PROCEDURE — 71046 X-RAY EXAM CHEST 2 VIEWS: CPT | Mod: 26,,, | Performed by: RADIOLOGY

## 2021-02-11 PROCEDURE — 93000 ELECTROCARDIOGRAM COMPLETE: CPT | Mod: S$GLB,,, | Performed by: INTERNAL MEDICINE

## 2021-02-11 PROCEDURE — 1126F AMNT PAIN NOTED NONE PRSNT: CPT | Mod: S$GLB,,, | Performed by: INTERNAL MEDICINE

## 2021-02-11 PROCEDURE — 3288F PR FALLS RISK ASSESSMENT DOCUMENTED: ICD-10-PCS | Mod: CPTII,S$GLB,, | Performed by: INTERNAL MEDICINE

## 2021-02-11 PROCEDURE — 1126F PR PAIN SEVERITY QUANTIFIED, NO PAIN PRESENT: ICD-10-PCS | Mod: S$GLB,,, | Performed by: INTERNAL MEDICINE

## 2021-02-11 PROCEDURE — 1101F PT FALLS ASSESS-DOCD LE1/YR: CPT | Mod: CPTII,S$GLB,, | Performed by: INTERNAL MEDICINE

## 2021-02-11 PROCEDURE — 99999 PR PBB SHADOW E&M-EST. PATIENT-LVL IV: ICD-10-PCS | Mod: PBBFAC,,, | Performed by: INTERNAL MEDICINE

## 2021-02-11 RX ORDER — SPIRONOLACTONE 25 MG/1
25 TABLET ORAL DAILY
Qty: 30 TABLET | Refills: 11 | Status: SHIPPED | OUTPATIENT
Start: 2021-02-11 | End: 2021-02-19 | Stop reason: SDUPTHER

## 2021-02-19 DIAGNOSIS — I10 ESSENTIAL HYPERTENSION: Chronic | ICD-10-CM

## 2021-02-19 DIAGNOSIS — I50.32 CHRONIC DIASTOLIC HEART FAILURE: ICD-10-CM

## 2021-02-19 RX ORDER — SPIRONOLACTONE 25 MG/1
25 TABLET ORAL DAILY
Qty: 30 TABLET | Refills: 11 | Status: SHIPPED | OUTPATIENT
Start: 2021-02-19 | End: 2021-02-25 | Stop reason: SDUPTHER

## 2021-02-22 ENCOUNTER — CLINICAL SUPPORT (OUTPATIENT)
Dept: INTERNAL MEDICINE | Facility: CLINIC | Age: 86
End: 2021-02-22
Payer: MEDICARE

## 2021-02-22 DIAGNOSIS — J31.0 CHRONIC RHINITIS: ICD-10-CM

## 2021-02-22 PROCEDURE — 95115 IMMUNOTHERAPY ONE INJECTION: CPT | Mod: S$GLB,,, | Performed by: FAMILY MEDICINE

## 2021-02-22 PROCEDURE — 99499 NO LOS: ICD-10-PCS | Mod: S$GLB,,, | Performed by: ALLERGY & IMMUNOLOGY

## 2021-02-22 PROCEDURE — 95115 PR IMMUNOTHERAPY, ONE INJECTION: ICD-10-PCS | Mod: S$GLB,,, | Performed by: FAMILY MEDICINE

## 2021-02-22 PROCEDURE — 99499 UNLISTED E&M SERVICE: CPT | Mod: S$GLB,,, | Performed by: ALLERGY & IMMUNOLOGY

## 2021-02-25 ENCOUNTER — OFFICE VISIT (OUTPATIENT)
Dept: CARDIOLOGY | Facility: CLINIC | Age: 86
End: 2021-02-25
Payer: MEDICARE

## 2021-02-25 ENCOUNTER — LAB VISIT (OUTPATIENT)
Dept: LAB | Facility: HOSPITAL | Age: 86
End: 2021-02-25
Attending: INTERNAL MEDICINE
Payer: MEDICARE

## 2021-02-25 VITALS
DIASTOLIC BLOOD PRESSURE: 73 MMHG | HEART RATE: 82 BPM | HEIGHT: 65 IN | SYSTOLIC BLOOD PRESSURE: 136 MMHG | WEIGHT: 157.44 LBS | BODY MASS INDEX: 26.23 KG/M2

## 2021-02-25 DIAGNOSIS — I10 ESSENTIAL HYPERTENSION: Chronic | ICD-10-CM

## 2021-02-25 DIAGNOSIS — I50.32 CHRONIC DIASTOLIC HEART FAILURE: ICD-10-CM

## 2021-02-25 PROCEDURE — 1159F PR MEDICATION LIST DOCUMENTED IN MEDICAL RECORD: ICD-10-PCS | Mod: S$GLB,,, | Performed by: INTERNAL MEDICINE

## 2021-02-25 PROCEDURE — 99999 PR PBB SHADOW E&M-EST. PATIENT-LVL III: CPT | Mod: PBBFAC,,, | Performed by: INTERNAL MEDICINE

## 2021-02-25 PROCEDURE — 3288F PR FALLS RISK ASSESSMENT DOCUMENTED: ICD-10-PCS | Mod: CPTII,S$GLB,, | Performed by: INTERNAL MEDICINE

## 2021-02-25 PROCEDURE — 1101F PT FALLS ASSESS-DOCD LE1/YR: CPT | Mod: CPTII,S$GLB,, | Performed by: INTERNAL MEDICINE

## 2021-02-25 PROCEDURE — 1159F MED LIST DOCD IN RCRD: CPT | Mod: S$GLB,,, | Performed by: INTERNAL MEDICINE

## 2021-02-25 PROCEDURE — 99214 PR OFFICE/OUTPT VISIT, EST, LEVL IV, 30-39 MIN: ICD-10-PCS | Mod: S$GLB,,, | Performed by: INTERNAL MEDICINE

## 2021-02-25 PROCEDURE — 1126F AMNT PAIN NOTED NONE PRSNT: CPT | Mod: S$GLB,,, | Performed by: INTERNAL MEDICINE

## 2021-02-25 PROCEDURE — 80048 BASIC METABOLIC PNL TOTAL CA: CPT

## 2021-02-25 PROCEDURE — 1101F PR PT FALLS ASSESS DOC 0-1 FALLS W/OUT INJ PAST YR: ICD-10-PCS | Mod: CPTII,S$GLB,, | Performed by: INTERNAL MEDICINE

## 2021-02-25 PROCEDURE — 99214 OFFICE O/P EST MOD 30 MIN: CPT | Mod: S$GLB,,, | Performed by: INTERNAL MEDICINE

## 2021-02-25 PROCEDURE — 99999 PR PBB SHADOW E&M-EST. PATIENT-LVL III: ICD-10-PCS | Mod: PBBFAC,,, | Performed by: INTERNAL MEDICINE

## 2021-02-25 PROCEDURE — 3288F FALL RISK ASSESSMENT DOCD: CPT | Mod: CPTII,S$GLB,, | Performed by: INTERNAL MEDICINE

## 2021-02-25 PROCEDURE — 36415 COLL VENOUS BLD VENIPUNCTURE: CPT | Mod: PO | Performed by: INTERNAL MEDICINE

## 2021-02-25 PROCEDURE — 1126F PR PAIN SEVERITY QUANTIFIED, NO PAIN PRESENT: ICD-10-PCS | Mod: S$GLB,,, | Performed by: INTERNAL MEDICINE

## 2021-02-25 RX ORDER — SPIRONOLACTONE 25 MG/1
25 TABLET ORAL DAILY
Qty: 90 TABLET | Refills: 3 | Status: SHIPPED | OUTPATIENT
Start: 2021-02-25 | End: 2021-04-08

## 2021-02-26 LAB
ANION GAP SERPL CALC-SCNC: 13 MMOL/L (ref 8–16)
BUN SERPL-MCNC: 24 MG/DL (ref 8–23)
CALCIUM SERPL-MCNC: 9.4 MG/DL (ref 8.7–10.5)
CHLORIDE SERPL-SCNC: 100 MMOL/L (ref 95–110)
CO2 SERPL-SCNC: 28 MMOL/L (ref 23–29)
CREAT SERPL-MCNC: 1.1 MG/DL (ref 0.5–1.4)
EST. GFR  (AFRICAN AMERICAN): 51.4 ML/MIN/1.73 M^2
EST. GFR  (NON AFRICAN AMERICAN): 44.6 ML/MIN/1.73 M^2
GLUCOSE SERPL-MCNC: 102 MG/DL (ref 70–110)
POTASSIUM SERPL-SCNC: 4.3 MMOL/L (ref 3.5–5.1)
SODIUM SERPL-SCNC: 141 MMOL/L (ref 136–145)

## 2021-03-02 ENCOUNTER — PROCEDURE VISIT (OUTPATIENT)
Dept: OPHTHALMOLOGY | Facility: CLINIC | Age: 86
End: 2021-03-02
Payer: MEDICARE

## 2021-03-02 DIAGNOSIS — H35.033 HYPERTENSIVE RETINOPATHY OF BOTH EYES: ICD-10-CM

## 2021-03-02 DIAGNOSIS — H35.3221 EXUDATIVE AGE-RELATED MACULAR DEGENERATION OF LEFT EYE WITH ACTIVE CHOROIDAL NEOVASCULARIZATION: Primary | ICD-10-CM

## 2021-03-02 DIAGNOSIS — H34.8122 STABLE CENTRAL RETINAL VEIN OCCLUSION OF LEFT EYE: ICD-10-CM

## 2021-03-02 DIAGNOSIS — H43.813 POSTERIOR VITREOUS DETACHMENT, BILATERAL: ICD-10-CM

## 2021-03-02 PROCEDURE — 92014 PR EYE EXAM, EST PATIENT,COMPREHESV: ICD-10-PCS | Mod: S$GLB,,, | Performed by: OPHTHALMOLOGY

## 2021-03-02 PROCEDURE — 92134 POSTERIOR SEGMENT OCT RETINA (OCULAR COHERENCE TOMOGRAPHY)-BOTH EYES: ICD-10-PCS | Mod: S$GLB,,, | Performed by: OPHTHALMOLOGY

## 2021-03-02 PROCEDURE — 99499 RISK ADDL DX/OHS AUDIT: ICD-10-PCS | Mod: S$GLB,,, | Performed by: OPHTHALMOLOGY

## 2021-03-02 PROCEDURE — 92014 COMPRE OPH EXAM EST PT 1/>: CPT | Mod: S$GLB,,, | Performed by: OPHTHALMOLOGY

## 2021-03-02 PROCEDURE — 92202 PR OPHTHALMOSCOPY, EXT, W/DRAW OPTIC NERVE/MACULA, I&R, UNI/BI: ICD-10-PCS | Mod: S$GLB,,, | Performed by: OPHTHALMOLOGY

## 2021-03-02 PROCEDURE — 99499 UNLISTED E&M SERVICE: CPT | Mod: S$GLB,,, | Performed by: OPHTHALMOLOGY

## 2021-03-02 PROCEDURE — 92134 CPTRZ OPH DX IMG PST SGM RTA: CPT | Mod: S$GLB,,, | Performed by: OPHTHALMOLOGY

## 2021-03-02 PROCEDURE — 92202 OPSCPY EXTND ON/MAC DRAW: CPT | Mod: S$GLB,,, | Performed by: OPHTHALMOLOGY

## 2021-03-03 ENCOUNTER — TELEPHONE (OUTPATIENT)
Dept: INTERNAL MEDICINE | Facility: CLINIC | Age: 86
End: 2021-03-03

## 2021-03-06 PROCEDURE — G0179 MD RECERTIFICATION HHA PT: HCPCS | Mod: ,,, | Performed by: INTERNAL MEDICINE

## 2021-03-06 PROCEDURE — G0179 PR HOME HEALTH MD RECERTIFICATION: ICD-10-PCS | Mod: ,,, | Performed by: INTERNAL MEDICINE

## 2021-03-09 ENCOUNTER — TELEPHONE (OUTPATIENT)
Dept: INTERNAL MEDICINE | Facility: CLINIC | Age: 86
End: 2021-03-09

## 2021-03-09 ENCOUNTER — EXTERNAL HOME HEALTH (OUTPATIENT)
Dept: HOME HEALTH SERVICES | Facility: HOSPITAL | Age: 86
End: 2021-03-09
Payer: MEDICARE

## 2021-03-09 DIAGNOSIS — Z78.0 POSTMENOPAUSAL: Primary | ICD-10-CM

## 2021-03-10 ENCOUNTER — DOCUMENT SCAN (OUTPATIENT)
Dept: HOME HEALTH SERVICES | Facility: HOSPITAL | Age: 86
End: 2021-03-10
Payer: MEDICARE

## 2021-03-15 ENCOUNTER — PATIENT MESSAGE (OUTPATIENT)
Dept: CARDIOLOGY | Facility: CLINIC | Age: 86
End: 2021-03-15

## 2021-03-22 ENCOUNTER — CLINICAL SUPPORT (OUTPATIENT)
Dept: INTERNAL MEDICINE | Facility: CLINIC | Age: 86
End: 2021-03-22
Payer: MEDICARE

## 2021-03-22 DIAGNOSIS — J31.0 CHRONIC RHINITIS: ICD-10-CM

## 2021-03-22 PROCEDURE — 99499 NO LOS: ICD-10-PCS | Mod: S$GLB,,, | Performed by: ALLERGY & IMMUNOLOGY

## 2021-03-22 PROCEDURE — 95115 PR IMMUNOTHERAPY, ONE INJECTION: ICD-10-PCS | Mod: S$GLB,,, | Performed by: FAMILY MEDICINE

## 2021-03-22 PROCEDURE — 99499 UNLISTED E&M SERVICE: CPT | Mod: S$GLB,,, | Performed by: ALLERGY & IMMUNOLOGY

## 2021-03-22 PROCEDURE — 95115 IMMUNOTHERAPY ONE INJECTION: CPT | Mod: S$GLB,,, | Performed by: FAMILY MEDICINE

## 2021-03-24 DIAGNOSIS — M25.552 LEFT HIP PAIN: Primary | ICD-10-CM

## 2021-03-26 ENCOUNTER — HOSPITAL ENCOUNTER (OUTPATIENT)
Dept: RADIOLOGY | Facility: HOSPITAL | Age: 86
Discharge: HOME | End: 2021-03-26
Attending: ORTHOPAEDIC SURGERY
Payer: MEDICARE

## 2021-03-26 ENCOUNTER — OFFICE VISIT (OUTPATIENT)
Dept: ORTHOPEDICS | Facility: CLINIC | Age: 86
End: 2021-03-26
Payer: MEDICARE

## 2021-03-26 VITALS — BODY MASS INDEX: 26.26 KG/M2 | HEIGHT: 65 IN | WEIGHT: 157.63 LBS

## 2021-03-26 DIAGNOSIS — M25.552 LEFT HIP PAIN: ICD-10-CM

## 2021-03-26 DIAGNOSIS — M70.62 GREATER TROCHANTERIC BURSITIS OF LEFT HIP: Primary | ICD-10-CM

## 2021-03-26 PROCEDURE — 20610 DRAIN/INJ JOINT/BURSA W/O US: CPT | Mod: LT,S$GLB,, | Performed by: ORTHOPAEDIC SURGERY

## 2021-03-26 PROCEDURE — 99999 PR PBB SHADOW E&M-EST. PATIENT-LVL III: CPT | Mod: PBBFAC,,, | Performed by: ORTHOPAEDIC SURGERY

## 2021-03-26 PROCEDURE — 99999 PR PBB SHADOW E&M-EST. PATIENT-LVL III: ICD-10-PCS | Mod: PBBFAC,,, | Performed by: ORTHOPAEDIC SURGERY

## 2021-03-26 PROCEDURE — 3288F PR FALLS RISK ASSESSMENT DOCUMENTED: ICD-10-PCS | Mod: CPTII,S$GLB,, | Performed by: ORTHOPAEDIC SURGERY

## 2021-03-26 PROCEDURE — 99203 PR OFFICE/OUTPT VISIT, NEW, LEVL III, 30-44 MIN: ICD-10-PCS | Mod: 25,S$GLB,, | Performed by: ORTHOPAEDIC SURGERY

## 2021-03-26 PROCEDURE — 1101F PR PT FALLS ASSESS DOC 0-1 FALLS W/OUT INJ PAST YR: ICD-10-PCS | Mod: CPTII,S$GLB,, | Performed by: ORTHOPAEDIC SURGERY

## 2021-03-26 PROCEDURE — 99203 OFFICE O/P NEW LOW 30 MIN: CPT | Mod: 25,S$GLB,, | Performed by: ORTHOPAEDIC SURGERY

## 2021-03-26 PROCEDURE — 1125F AMNT PAIN NOTED PAIN PRSNT: CPT | Mod: S$GLB,,, | Performed by: ORTHOPAEDIC SURGERY

## 2021-03-26 PROCEDURE — 1159F PR MEDICATION LIST DOCUMENTED IN MEDICAL RECORD: ICD-10-PCS | Mod: S$GLB,,, | Performed by: ORTHOPAEDIC SURGERY

## 2021-03-26 PROCEDURE — 20610 LARGE JOINT ASPIRATION/INJECTION: L GREATER TROCHANTERIC BURSA: ICD-10-PCS | Mod: LT,S$GLB,, | Performed by: ORTHOPAEDIC SURGERY

## 2021-03-26 PROCEDURE — 73502 XR HIP 2 VIEW LEFT: ICD-10-PCS | Mod: 26,LT,, | Performed by: RADIOLOGY

## 2021-03-26 PROCEDURE — 1101F PT FALLS ASSESS-DOCD LE1/YR: CPT | Mod: CPTII,S$GLB,, | Performed by: ORTHOPAEDIC SURGERY

## 2021-03-26 PROCEDURE — 3288F FALL RISK ASSESSMENT DOCD: CPT | Mod: CPTII,S$GLB,, | Performed by: ORTHOPAEDIC SURGERY

## 2021-03-26 PROCEDURE — 73502 X-RAY EXAM HIP UNI 2-3 VIEWS: CPT | Mod: TC,LT

## 2021-03-26 PROCEDURE — 1159F MED LIST DOCD IN RCRD: CPT | Mod: S$GLB,,, | Performed by: ORTHOPAEDIC SURGERY

## 2021-03-26 PROCEDURE — 1125F PR PAIN SEVERITY QUANTIFIED, PAIN PRESENT: ICD-10-PCS | Mod: S$GLB,,, | Performed by: ORTHOPAEDIC SURGERY

## 2021-03-26 PROCEDURE — 73502 X-RAY EXAM HIP UNI 2-3 VIEWS: CPT | Mod: 26,LT,, | Performed by: RADIOLOGY

## 2021-03-26 RX ADMIN — TRIAMCINOLONE ACETONIDE 40 MG: 40 INJECTION, SUSPENSION INTRA-ARTICULAR; INTRAMUSCULAR at 01:03

## 2021-03-29 ENCOUNTER — APPOINTMENT (OUTPATIENT)
Dept: RADIOLOGY | Facility: CLINIC | Age: 86
End: 2021-03-29
Attending: INTERNAL MEDICINE
Payer: MEDICARE

## 2021-03-29 DIAGNOSIS — Z78.0 POSTMENOPAUSAL: ICD-10-CM

## 2021-03-29 PROCEDURE — 77080 DXA BONE DENSITY AXIAL: CPT | Mod: TC,PO

## 2021-03-29 PROCEDURE — 77080 DXA BONE DENSITY AXIAL: CPT | Mod: 26,,, | Performed by: INTERNAL MEDICINE

## 2021-03-29 PROCEDURE — 77080 DEXA BONE DENSITY SPINE HIP: ICD-10-PCS | Mod: 26,,, | Performed by: INTERNAL MEDICINE

## 2021-04-02 RX ORDER — TRIAMCINOLONE ACETONIDE 40 MG/ML
40 INJECTION, SUSPENSION INTRA-ARTICULAR; INTRAMUSCULAR
Status: DISCONTINUED | OUTPATIENT
Start: 2021-03-26 | End: 2021-04-02 | Stop reason: HOSPADM

## 2021-04-07 ENCOUNTER — OFFICE VISIT (OUTPATIENT)
Dept: OPHTHALMOLOGY | Facility: CLINIC | Age: 86
End: 2021-04-07
Payer: COMMERCIAL

## 2021-04-07 ENCOUNTER — DOCUMENT SCAN (OUTPATIENT)
Dept: HOME HEALTH SERVICES | Facility: HOSPITAL | Age: 86
End: 2021-04-07
Payer: MEDICARE

## 2021-04-07 DIAGNOSIS — H04.123 DRY EYE SYNDROME OF BOTH EYES: ICD-10-CM

## 2021-04-07 DIAGNOSIS — Z96.1 PSEUDOPHAKIA, BOTH EYES: ICD-10-CM

## 2021-04-07 DIAGNOSIS — H52.7 REFRACTIVE ERROR: Primary | ICD-10-CM

## 2021-04-07 PROCEDURE — 99999 PR PBB SHADOW E&M-EST. PATIENT-LVL III: CPT | Mod: PBBFAC,,, | Performed by: OPHTHALMOLOGY

## 2021-04-07 PROCEDURE — 92012 PR EYE EXAM, EST PATIENT,INTERMED: ICD-10-PCS | Mod: S$GLB,,, | Performed by: OPHTHALMOLOGY

## 2021-04-07 PROCEDURE — 92012 INTRM OPH EXAM EST PATIENT: CPT | Mod: S$GLB,,, | Performed by: OPHTHALMOLOGY

## 2021-04-07 PROCEDURE — 99999 PR PBB SHADOW E&M-EST. PATIENT-LVL III: ICD-10-PCS | Mod: PBBFAC,,, | Performed by: OPHTHALMOLOGY

## 2021-04-08 ENCOUNTER — LAB VISIT (OUTPATIENT)
Dept: LAB | Facility: HOSPITAL | Age: 86
End: 2021-04-08
Attending: INTERNAL MEDICINE
Payer: MEDICARE

## 2021-04-08 ENCOUNTER — TELEPHONE (OUTPATIENT)
Dept: OPHTHALMOLOGY | Facility: CLINIC | Age: 86
End: 2021-04-08

## 2021-04-08 ENCOUNTER — OFFICE VISIT (OUTPATIENT)
Dept: CARDIOLOGY | Facility: CLINIC | Age: 86
End: 2021-04-08
Payer: MEDICARE

## 2021-04-08 VITALS
HEIGHT: 65 IN | WEIGHT: 157.44 LBS | BODY MASS INDEX: 26.23 KG/M2 | SYSTOLIC BLOOD PRESSURE: 129 MMHG | DIASTOLIC BLOOD PRESSURE: 71 MMHG | HEART RATE: 78 BPM

## 2021-04-08 DIAGNOSIS — N18.31 STAGE 3A CHRONIC KIDNEY DISEASE: Chronic | ICD-10-CM

## 2021-04-08 DIAGNOSIS — I48.20 CHRONIC ATRIAL FIBRILLATION: Chronic | ICD-10-CM

## 2021-04-08 DIAGNOSIS — I10 ESSENTIAL HYPERTENSION: Chronic | ICD-10-CM

## 2021-04-08 DIAGNOSIS — Z95.818 PRESENCE OF WATCHMAN LEFT ATRIAL APPENDAGE CLOSURE DEVICE: Chronic | ICD-10-CM

## 2021-04-08 DIAGNOSIS — I50.32 CHRONIC DIASTOLIC HEART FAILURE: Primary | Chronic | ICD-10-CM

## 2021-04-08 PROCEDURE — 99214 OFFICE O/P EST MOD 30 MIN: CPT | Mod: S$GLB,,, | Performed by: INTERNAL MEDICINE

## 2021-04-08 PROCEDURE — 3288F FALL RISK ASSESSMENT DOCD: CPT | Mod: CPTII,S$GLB,, | Performed by: INTERNAL MEDICINE

## 2021-04-08 PROCEDURE — 99214 PR OFFICE/OUTPT VISIT, EST, LEVL IV, 30-39 MIN: ICD-10-PCS | Mod: S$GLB,,, | Performed by: INTERNAL MEDICINE

## 2021-04-08 PROCEDURE — 80048 BASIC METABOLIC PNL TOTAL CA: CPT | Performed by: INTERNAL MEDICINE

## 2021-04-08 PROCEDURE — 1126F PR PAIN SEVERITY QUANTIFIED, NO PAIN PRESENT: ICD-10-PCS | Mod: S$GLB,,, | Performed by: INTERNAL MEDICINE

## 2021-04-08 PROCEDURE — 1159F MED LIST DOCD IN RCRD: CPT | Mod: S$GLB,,, | Performed by: INTERNAL MEDICINE

## 2021-04-08 PROCEDURE — 36415 COLL VENOUS BLD VENIPUNCTURE: CPT | Mod: PO | Performed by: INTERNAL MEDICINE

## 2021-04-08 PROCEDURE — 1101F PR PT FALLS ASSESS DOC 0-1 FALLS W/OUT INJ PAST YR: ICD-10-PCS | Mod: CPTII,S$GLB,, | Performed by: INTERNAL MEDICINE

## 2021-04-08 PROCEDURE — 3288F PR FALLS RISK ASSESSMENT DOCUMENTED: ICD-10-PCS | Mod: CPTII,S$GLB,, | Performed by: INTERNAL MEDICINE

## 2021-04-08 PROCEDURE — 1126F AMNT PAIN NOTED NONE PRSNT: CPT | Mod: S$GLB,,, | Performed by: INTERNAL MEDICINE

## 2021-04-08 PROCEDURE — 99999 PR PBB SHADOW E&M-EST. PATIENT-LVL IV: CPT | Mod: PBBFAC,,, | Performed by: INTERNAL MEDICINE

## 2021-04-08 PROCEDURE — 1159F PR MEDICATION LIST DOCUMENTED IN MEDICAL RECORD: ICD-10-PCS | Mod: S$GLB,,, | Performed by: INTERNAL MEDICINE

## 2021-04-08 PROCEDURE — 99999 PR PBB SHADOW E&M-EST. PATIENT-LVL IV: ICD-10-PCS | Mod: PBBFAC,,, | Performed by: INTERNAL MEDICINE

## 2021-04-08 PROCEDURE — 1101F PT FALLS ASSESS-DOCD LE1/YR: CPT | Mod: CPTII,S$GLB,, | Performed by: INTERNAL MEDICINE

## 2021-04-08 PROCEDURE — 85025 COMPLETE CBC W/AUTO DIFF WBC: CPT | Performed by: INTERNAL MEDICINE

## 2021-04-08 RX ORDER — FUROSEMIDE 20 MG/1
20 TABLET ORAL
Qty: 180 TABLET | Refills: 3 | Status: SHIPPED | OUTPATIENT
Start: 2021-04-08 | End: 2021-05-25 | Stop reason: SDUPTHER

## 2021-04-08 RX ORDER — SPIRONOLACTONE 25 MG/1
25 TABLET ORAL
Qty: 180 TABLET | Refills: 3 | Status: SHIPPED | OUTPATIENT
Start: 2021-04-08 | End: 2021-05-25 | Stop reason: SDUPTHER

## 2021-04-09 LAB
ANION GAP SERPL CALC-SCNC: 11 MMOL/L (ref 8–16)
BASOPHILS # BLD AUTO: 0.02 K/UL (ref 0–0.2)
BASOPHILS NFR BLD: 0.4 % (ref 0–1.9)
BUN SERPL-MCNC: 24 MG/DL (ref 8–23)
CALCIUM SERPL-MCNC: 9.1 MG/DL (ref 8.7–10.5)
CHLORIDE SERPL-SCNC: 100 MMOL/L (ref 95–110)
CO2 SERPL-SCNC: 28 MMOL/L (ref 23–29)
CREAT SERPL-MCNC: 1 MG/DL (ref 0.5–1.4)
DIFFERENTIAL METHOD: ABNORMAL
EOSINOPHIL # BLD AUTO: 0 K/UL (ref 0–0.5)
EOSINOPHIL NFR BLD: 0.8 % (ref 0–8)
ERYTHROCYTE [DISTWIDTH] IN BLOOD BY AUTOMATED COUNT: 15.9 % (ref 11.5–14.5)
EST. GFR  (AFRICAN AMERICAN): 57.7 ML/MIN/1.73 M^2
EST. GFR  (NON AFRICAN AMERICAN): 50.1 ML/MIN/1.73 M^2
GLUCOSE SERPL-MCNC: 93 MG/DL (ref 70–110)
HCT VFR BLD AUTO: 36 % (ref 37–48.5)
HGB BLD-MCNC: 11.7 G/DL (ref 12–16)
IMM GRANULOCYTES # BLD AUTO: 0.01 K/UL (ref 0–0.04)
IMM GRANULOCYTES NFR BLD AUTO: 0.2 % (ref 0–0.5)
LYMPHOCYTES # BLD AUTO: 0.9 K/UL (ref 1–4.8)
LYMPHOCYTES NFR BLD: 17.2 % (ref 18–48)
MCH RBC QN AUTO: 30.4 PG (ref 27–31)
MCHC RBC AUTO-ENTMCNC: 32.5 G/DL (ref 32–36)
MCV RBC AUTO: 94 FL (ref 82–98)
MONOCYTES # BLD AUTO: 0.4 K/UL (ref 0.3–1)
MONOCYTES NFR BLD: 7.8 % (ref 4–15)
NEUTROPHILS # BLD AUTO: 3.8 K/UL (ref 1.8–7.7)
NEUTROPHILS NFR BLD: 73.6 % (ref 38–73)
NRBC BLD-RTO: 0 /100 WBC
PLATELET # BLD AUTO: 222 K/UL (ref 150–450)
PMV BLD AUTO: 10.4 FL (ref 9.2–12.9)
POTASSIUM SERPL-SCNC: 4.5 MMOL/L (ref 3.5–5.1)
RBC # BLD AUTO: 3.85 M/UL (ref 4–5.4)
SODIUM SERPL-SCNC: 139 MMOL/L (ref 136–145)
WBC # BLD AUTO: 5.11 K/UL (ref 3.9–12.7)

## 2021-04-13 ENCOUNTER — PATIENT MESSAGE (OUTPATIENT)
Dept: OPHTHALMOLOGY | Facility: CLINIC | Age: 86
End: 2021-04-13

## 2021-04-14 ENCOUNTER — PATIENT MESSAGE (OUTPATIENT)
Dept: CARDIOLOGY | Facility: CLINIC | Age: 86
End: 2021-04-14

## 2021-04-14 ENCOUNTER — TELEPHONE (OUTPATIENT)
Dept: OPHTHALMOLOGY | Facility: CLINIC | Age: 86
End: 2021-04-14

## 2021-04-15 ENCOUNTER — DOCUMENT SCAN (OUTPATIENT)
Dept: HOME HEALTH SERVICES | Facility: HOSPITAL | Age: 86
End: 2021-04-15
Payer: MEDICARE

## 2021-04-19 ENCOUNTER — PATIENT MESSAGE (OUTPATIENT)
Dept: INTERNAL MEDICINE | Facility: CLINIC | Age: 86
End: 2021-04-19

## 2021-04-19 ENCOUNTER — CLINICAL SUPPORT (OUTPATIENT)
Dept: INTERNAL MEDICINE | Facility: CLINIC | Age: 86
End: 2021-04-19
Payer: MEDICARE

## 2021-04-19 ENCOUNTER — DOCUMENT SCAN (OUTPATIENT)
Dept: HOME HEALTH SERVICES | Facility: HOSPITAL | Age: 86
End: 2021-04-19
Payer: MEDICARE

## 2021-04-19 DIAGNOSIS — J31.0 CHRONIC RHINITIS: ICD-10-CM

## 2021-04-19 PROCEDURE — 99499 NO LOS: ICD-10-PCS | Mod: S$GLB,,, | Performed by: ALLERGY & IMMUNOLOGY

## 2021-04-19 PROCEDURE — 99499 UNLISTED E&M SERVICE: CPT | Mod: S$GLB,,, | Performed by: ALLERGY & IMMUNOLOGY

## 2021-04-19 PROCEDURE — 95115 IMMUNOTHERAPY ONE INJECTION: CPT | Mod: S$GLB,,, | Performed by: FAMILY MEDICINE

## 2021-04-19 PROCEDURE — 95115 PR IMMUNOTHERAPY, ONE INJECTION: ICD-10-PCS | Mod: S$GLB,,, | Performed by: FAMILY MEDICINE

## 2021-04-24 NOTE — PATIENT INSTRUCTIONS
Probiotics (available over the counter) to help get healthy gut bacteria    Simethicone (gas-x - available over the counter) to help with gas pain    Colace to help soften the stool   
Appropriate

## 2021-04-28 ENCOUNTER — PATIENT MESSAGE (OUTPATIENT)
Dept: INTERNAL MEDICINE | Facility: CLINIC | Age: 86
End: 2021-04-28

## 2021-04-29 ENCOUNTER — LAB VISIT (OUTPATIENT)
Dept: LAB | Facility: HOSPITAL | Age: 86
End: 2021-04-29
Attending: INTERNAL MEDICINE
Payer: MEDICARE

## 2021-04-29 DIAGNOSIS — I10 HYPERTENSION, UNSPECIFIED TYPE: ICD-10-CM

## 2021-04-29 LAB
ALBUMIN SERPL BCP-MCNC: 4 G/DL (ref 3.5–5.2)
ALP SERPL-CCNC: 104 U/L (ref 55–135)
ALT SERPL W/O P-5'-P-CCNC: 11 U/L (ref 10–44)
ANION GAP SERPL CALC-SCNC: 10 MMOL/L (ref 8–16)
AST SERPL-CCNC: 18 U/L (ref 10–40)
BILIRUB SERPL-MCNC: 0.7 MG/DL (ref 0.1–1)
BUN SERPL-MCNC: 20 MG/DL (ref 8–23)
CALCIUM SERPL-MCNC: 9.5 MG/DL (ref 8.7–10.5)
CHLORIDE SERPL-SCNC: 101 MMOL/L (ref 95–110)
CHOLEST SERPL-MCNC: 193 MG/DL (ref 120–199)
CHOLEST/HDLC SERPL: 3 {RATIO} (ref 2–5)
CO2 SERPL-SCNC: 29 MMOL/L (ref 23–29)
CREAT SERPL-MCNC: 1.1 MG/DL (ref 0.5–1.4)
EST. GFR  (AFRICAN AMERICAN): 51.4 ML/MIN/1.73 M^2
EST. GFR  (NON AFRICAN AMERICAN): 44.6 ML/MIN/1.73 M^2
GLUCOSE SERPL-MCNC: 101 MG/DL (ref 70–110)
HDLC SERPL-MCNC: 65 MG/DL (ref 40–75)
HDLC SERPL: 33.7 % (ref 20–50)
LDLC SERPL CALC-MCNC: 112.8 MG/DL (ref 63–159)
NONHDLC SERPL-MCNC: 128 MG/DL
POTASSIUM SERPL-SCNC: 4.7 MMOL/L (ref 3.5–5.1)
PROT SERPL-MCNC: 7.2 G/DL (ref 6–8.4)
SODIUM SERPL-SCNC: 140 MMOL/L (ref 136–145)
TRIGL SERPL-MCNC: 76 MG/DL (ref 30–150)
TSH SERPL DL<=0.005 MIU/L-ACNC: 2.79 UIU/ML (ref 0.4–4)

## 2021-04-29 PROCEDURE — 36415 COLL VENOUS BLD VENIPUNCTURE: CPT | Mod: PO | Performed by: INTERNAL MEDICINE

## 2021-04-29 PROCEDURE — 84443 ASSAY THYROID STIM HORMONE: CPT | Performed by: INTERNAL MEDICINE

## 2021-04-29 PROCEDURE — 80053 COMPREHEN METABOLIC PANEL: CPT | Performed by: INTERNAL MEDICINE

## 2021-04-29 PROCEDURE — 80061 LIPID PANEL: CPT | Performed by: INTERNAL MEDICINE

## 2021-05-05 ENCOUNTER — PATIENT MESSAGE (OUTPATIENT)
Dept: INTERNAL MEDICINE | Facility: CLINIC | Age: 86
End: 2021-05-05

## 2021-05-06 ENCOUNTER — OFFICE VISIT (OUTPATIENT)
Dept: INTERNAL MEDICINE | Facility: CLINIC | Age: 86
End: 2021-05-06
Payer: MEDICARE

## 2021-05-06 VITALS
OXYGEN SATURATION: 100 % | HEART RATE: 66 BPM | SYSTOLIC BLOOD PRESSURE: 132 MMHG | WEIGHT: 154.31 LBS | DIASTOLIC BLOOD PRESSURE: 72 MMHG | TEMPERATURE: 97 F | HEIGHT: 65 IN | BODY MASS INDEX: 25.71 KG/M2

## 2021-05-06 DIAGNOSIS — I87.2 VENOUS INSUFFICIENCY OF BOTH LOWER EXTREMITIES: ICD-10-CM

## 2021-05-06 DIAGNOSIS — N32.81 OAB (OVERACTIVE BLADDER): ICD-10-CM

## 2021-05-06 DIAGNOSIS — R51.9 LEFT TEMPORAL HEADACHE: ICD-10-CM

## 2021-05-06 DIAGNOSIS — I48.20 CHRONIC ATRIAL FIBRILLATION: ICD-10-CM

## 2021-05-06 DIAGNOSIS — I10 HYPERTENSION, UNSPECIFIED TYPE: ICD-10-CM

## 2021-05-06 DIAGNOSIS — E13.42 DIABETIC POLYNEUROPATHY ASSOCIATED WITH OTHER SPECIFIED DIABETES MELLITUS: ICD-10-CM

## 2021-05-06 DIAGNOSIS — M46.97 INFLAMMATORY SPONDYLOPATHY OF LUMBOSACRAL REGION: ICD-10-CM

## 2021-05-06 DIAGNOSIS — N18.30 STAGE 3 CHRONIC KIDNEY DISEASE, UNSPECIFIED WHETHER STAGE 3A OR 3B CKD: ICD-10-CM

## 2021-05-06 DIAGNOSIS — Z86.73 HISTORY OF TIA (TRANSIENT ISCHEMIC ATTACK): ICD-10-CM

## 2021-05-06 DIAGNOSIS — I50.32 CHRONIC DIASTOLIC HEART FAILURE: ICD-10-CM

## 2021-05-06 DIAGNOSIS — I70.0 ATHEROSCLEROSIS OF AORTA: ICD-10-CM

## 2021-05-06 DIAGNOSIS — Z00.00 ANNUAL PHYSICAL EXAM: Primary | ICD-10-CM

## 2021-05-06 DIAGNOSIS — H81.09 MENIERE'S DISEASE, UNSPECIFIED LATERALITY: ICD-10-CM

## 2021-05-06 PROCEDURE — 99397 PER PM REEVAL EST PAT 65+ YR: CPT | Mod: S$GLB,,, | Performed by: INTERNAL MEDICINE

## 2021-05-06 PROCEDURE — 1101F PT FALLS ASSESS-DOCD LE1/YR: CPT | Mod: CPTII,S$GLB,, | Performed by: INTERNAL MEDICINE

## 2021-05-06 PROCEDURE — 99397 PR PREVENTIVE VISIT,EST,65 & OVER: ICD-10-PCS | Mod: S$GLB,,, | Performed by: INTERNAL MEDICINE

## 2021-05-06 PROCEDURE — 1126F PR PAIN SEVERITY QUANTIFIED, NO PAIN PRESENT: ICD-10-PCS | Mod: S$GLB,,, | Performed by: INTERNAL MEDICINE

## 2021-05-06 PROCEDURE — 99499 UNLISTED E&M SERVICE: CPT | Mod: S$GLB,,, | Performed by: INTERNAL MEDICINE

## 2021-05-06 PROCEDURE — 1126F AMNT PAIN NOTED NONE PRSNT: CPT | Mod: S$GLB,,, | Performed by: INTERNAL MEDICINE

## 2021-05-06 PROCEDURE — 3288F PR FALLS RISK ASSESSMENT DOCUMENTED: ICD-10-PCS | Mod: CPTII,S$GLB,, | Performed by: INTERNAL MEDICINE

## 2021-05-06 PROCEDURE — 1101F PR PT FALLS ASSESS DOC 0-1 FALLS W/OUT INJ PAST YR: ICD-10-PCS | Mod: CPTII,S$GLB,, | Performed by: INTERNAL MEDICINE

## 2021-05-06 PROCEDURE — 3288F FALL RISK ASSESSMENT DOCD: CPT | Mod: CPTII,S$GLB,, | Performed by: INTERNAL MEDICINE

## 2021-05-06 PROCEDURE — 99999 PR PBB SHADOW E&M-EST. PATIENT-LVL IV: ICD-10-PCS | Mod: PBBFAC,,, | Performed by: INTERNAL MEDICINE

## 2021-05-06 PROCEDURE — 99499 RISK ADDL DX/OHS AUDIT: ICD-10-PCS | Mod: S$GLB,,, | Performed by: INTERNAL MEDICINE

## 2021-05-06 PROCEDURE — 99999 PR PBB SHADOW E&M-EST. PATIENT-LVL IV: CPT | Mod: PBBFAC,,, | Performed by: INTERNAL MEDICINE

## 2021-05-10 ENCOUNTER — PATIENT OUTREACH (OUTPATIENT)
Dept: ADMINISTRATIVE | Facility: OTHER | Age: 86
End: 2021-05-10

## 2021-05-11 ENCOUNTER — TELEPHONE (OUTPATIENT)
Dept: INTERNAL MEDICINE | Facility: CLINIC | Age: 86
End: 2021-05-11

## 2021-05-11 ENCOUNTER — PATIENT MESSAGE (OUTPATIENT)
Dept: CARDIOLOGY | Facility: CLINIC | Age: 86
End: 2021-05-11

## 2021-05-11 ENCOUNTER — OFFICE VISIT (OUTPATIENT)
Dept: OPHTHALMOLOGY | Facility: CLINIC | Age: 86
End: 2021-05-11
Payer: MEDICARE

## 2021-05-11 ENCOUNTER — TELEPHONE (OUTPATIENT)
Dept: ENDOSCOPY | Facility: HOSPITAL | Age: 86
End: 2021-05-11

## 2021-05-11 ENCOUNTER — OFFICE VISIT (OUTPATIENT)
Dept: GASTROENTEROLOGY | Facility: CLINIC | Age: 86
End: 2021-05-11
Payer: MEDICARE

## 2021-05-11 VITALS
SYSTOLIC BLOOD PRESSURE: 141 MMHG | BODY MASS INDEX: 25.93 KG/M2 | HEIGHT: 65 IN | WEIGHT: 155.63 LBS | DIASTOLIC BLOOD PRESSURE: 66 MMHG | HEART RATE: 66 BPM

## 2021-05-11 DIAGNOSIS — H35.033 HYPERTENSIVE RETINOPATHY OF BOTH EYES: ICD-10-CM

## 2021-05-11 DIAGNOSIS — H34.8122 STABLE CENTRAL RETINAL VEIN OCCLUSION OF LEFT EYE: ICD-10-CM

## 2021-05-11 DIAGNOSIS — H34.8320 BRANCH RETINAL VEIN OCCLUSION OF LEFT EYE WITH MACULAR EDEMA: ICD-10-CM

## 2021-05-11 DIAGNOSIS — H35.3221 EXUDATIVE AGE-RELATED MACULAR DEGENERATION OF LEFT EYE WITH ACTIVE CHOROIDAL NEOVASCULARIZATION: Primary | ICD-10-CM

## 2021-05-11 DIAGNOSIS — K59.00 CONSTIPATION, UNSPECIFIED CONSTIPATION TYPE: Primary | ICD-10-CM

## 2021-05-11 PROCEDURE — 92014 PR EYE EXAM, EST PATIENT,COMPREHESV: ICD-10-PCS | Mod: S$GLB,,, | Performed by: OPHTHALMOLOGY

## 2021-05-11 PROCEDURE — 1159F PR MEDICATION LIST DOCUMENTED IN MEDICAL RECORD: ICD-10-PCS | Mod: S$GLB,,, | Performed by: INTERNAL MEDICINE

## 2021-05-11 PROCEDURE — 92134 POSTERIOR SEGMENT OCT RETINA (OCULAR COHERENCE TOMOGRAPHY)-BOTH EYES: ICD-10-PCS | Mod: S$GLB,,, | Performed by: OPHTHALMOLOGY

## 2021-05-11 PROCEDURE — 92134 CPTRZ OPH DX IMG PST SGM RTA: CPT | Mod: S$GLB,,, | Performed by: OPHTHALMOLOGY

## 2021-05-11 PROCEDURE — 92201 PR OPHTHALMOSCOPY, EXT, W/RET DRAW/SCLERAL DEPR, I&R, UNI/BI: ICD-10-PCS | Mod: S$GLB,,, | Performed by: OPHTHALMOLOGY

## 2021-05-11 PROCEDURE — 1126F PR PAIN SEVERITY QUANTIFIED, NO PAIN PRESENT: ICD-10-PCS | Mod: S$GLB,,, | Performed by: INTERNAL MEDICINE

## 2021-05-11 PROCEDURE — 1126F AMNT PAIN NOTED NONE PRSNT: CPT | Mod: S$GLB,,, | Performed by: INTERNAL MEDICINE

## 2021-05-11 PROCEDURE — 3288F FALL RISK ASSESSMENT DOCD: CPT | Mod: CPTII,S$GLB,, | Performed by: OPHTHALMOLOGY

## 2021-05-11 PROCEDURE — 99999 PR PBB SHADOW E&M-EST. PATIENT-LVL IV: CPT | Mod: PBBFAC,,, | Performed by: INTERNAL MEDICINE

## 2021-05-11 PROCEDURE — 92014 COMPRE OPH EXAM EST PT 1/>: CPT | Mod: S$GLB,,, | Performed by: OPHTHALMOLOGY

## 2021-05-11 PROCEDURE — 99204 PR OFFICE/OUTPT VISIT, NEW, LEVL IV, 45-59 MIN: ICD-10-PCS | Mod: S$GLB,,, | Performed by: INTERNAL MEDICINE

## 2021-05-11 PROCEDURE — 1101F PT FALLS ASSESS-DOCD LE1/YR: CPT | Mod: CPTII,S$GLB,, | Performed by: OPHTHALMOLOGY

## 2021-05-11 PROCEDURE — 1126F PR PAIN SEVERITY QUANTIFIED, NO PAIN PRESENT: ICD-10-PCS | Mod: S$GLB,,, | Performed by: OPHTHALMOLOGY

## 2021-05-11 PROCEDURE — 99204 OFFICE O/P NEW MOD 45 MIN: CPT | Mod: S$GLB,,, | Performed by: INTERNAL MEDICINE

## 2021-05-11 PROCEDURE — 99999 PR PBB SHADOW E&M-EST. PATIENT-LVL III: CPT | Mod: PBBFAC,,, | Performed by: OPHTHALMOLOGY

## 2021-05-11 PROCEDURE — 1101F PR PT FALLS ASSESS DOC 0-1 FALLS W/OUT INJ PAST YR: ICD-10-PCS | Mod: CPTII,S$GLB,, | Performed by: INTERNAL MEDICINE

## 2021-05-11 PROCEDURE — 3288F PR FALLS RISK ASSESSMENT DOCUMENTED: ICD-10-PCS | Mod: CPTII,S$GLB,, | Performed by: OPHTHALMOLOGY

## 2021-05-11 PROCEDURE — 99999 PR PBB SHADOW E&M-EST. PATIENT-LVL III: ICD-10-PCS | Mod: PBBFAC,,, | Performed by: OPHTHALMOLOGY

## 2021-05-11 PROCEDURE — 1101F PR PT FALLS ASSESS DOC 0-1 FALLS W/OUT INJ PAST YR: ICD-10-PCS | Mod: CPTII,S$GLB,, | Performed by: OPHTHALMOLOGY

## 2021-05-11 PROCEDURE — 92201 OPSCPY EXTND RTA DRAW UNI/BI: CPT | Mod: S$GLB,,, | Performed by: OPHTHALMOLOGY

## 2021-05-11 PROCEDURE — 1126F AMNT PAIN NOTED NONE PRSNT: CPT | Mod: S$GLB,,, | Performed by: OPHTHALMOLOGY

## 2021-05-11 PROCEDURE — 1159F MED LIST DOCD IN RCRD: CPT | Mod: S$GLB,,, | Performed by: INTERNAL MEDICINE

## 2021-05-11 PROCEDURE — 3288F FALL RISK ASSESSMENT DOCD: CPT | Mod: CPTII,S$GLB,, | Performed by: INTERNAL MEDICINE

## 2021-05-11 PROCEDURE — 99999 PR PBB SHADOW E&M-EST. PATIENT-LVL IV: ICD-10-PCS | Mod: PBBFAC,,, | Performed by: INTERNAL MEDICINE

## 2021-05-11 PROCEDURE — 3288F PR FALLS RISK ASSESSMENT DOCUMENTED: ICD-10-PCS | Mod: CPTII,S$GLB,, | Performed by: INTERNAL MEDICINE

## 2021-05-11 PROCEDURE — 1101F PT FALLS ASSESS-DOCD LE1/YR: CPT | Mod: CPTII,S$GLB,, | Performed by: INTERNAL MEDICINE

## 2021-05-11 RX ORDER — POLYETHYLENE GLYCOL 3350, SODIUM SULFATE ANHYDROUS, SODIUM BICARBONATE, SODIUM CHLORIDE, POTASSIUM CHLORIDE 236; 22.74; 6.74; 5.86; 2.97 G/4L; G/4L; G/4L; G/4L; G/4L
4 POWDER, FOR SOLUTION ORAL ONCE
Qty: 4000 ML | Refills: 0 | Status: SHIPPED | OUTPATIENT
Start: 2021-05-11 | End: 2021-05-12

## 2021-05-11 RX ORDER — POLYETHYLENE GLYCOL 3350 17 G/17G
17 POWDER, FOR SOLUTION ORAL DAILY
Qty: 1020 G | Refills: 11 | Status: SHIPPED | OUTPATIENT
Start: 2021-05-11 | End: 2022-01-14

## 2021-05-12 ENCOUNTER — TELEPHONE (OUTPATIENT)
Dept: GASTROENTEROLOGY | Facility: CLINIC | Age: 86
End: 2021-05-12

## 2021-05-17 ENCOUNTER — CLINICAL SUPPORT (OUTPATIENT)
Dept: INTERNAL MEDICINE | Facility: CLINIC | Age: 86
End: 2021-05-17
Payer: MEDICARE

## 2021-05-17 DIAGNOSIS — J31.0 CHRONIC RHINITIS: ICD-10-CM

## 2021-05-17 PROCEDURE — 95115 IMMUNOTHERAPY ONE INJECTION: CPT | Mod: S$GLB,,, | Performed by: FAMILY MEDICINE

## 2021-05-17 PROCEDURE — 95115 PR IMMUNOTHERAPY, ONE INJECTION: ICD-10-PCS | Mod: S$GLB,,, | Performed by: FAMILY MEDICINE

## 2021-05-17 PROCEDURE — 99499 NO LOS: ICD-10-PCS | Mod: S$GLB,,, | Performed by: ALLERGY & IMMUNOLOGY

## 2021-05-17 PROCEDURE — 99499 UNLISTED E&M SERVICE: CPT | Mod: S$GLB,,, | Performed by: ALLERGY & IMMUNOLOGY

## 2021-05-25 ENCOUNTER — PATIENT MESSAGE (OUTPATIENT)
Dept: CARDIOLOGY | Facility: CLINIC | Age: 86
End: 2021-05-25

## 2021-05-25 DIAGNOSIS — I50.32 CHRONIC DIASTOLIC HEART FAILURE: Chronic | ICD-10-CM

## 2021-05-25 DIAGNOSIS — I10 ESSENTIAL HYPERTENSION: Chronic | ICD-10-CM

## 2021-05-25 RX ORDER — FUROSEMIDE 20 MG/1
20 TABLET ORAL
Qty: 180 TABLET | Refills: 3 | Status: SHIPPED | OUTPATIENT
Start: 2021-05-25 | End: 2022-05-27 | Stop reason: SDUPTHER

## 2021-05-25 RX ORDER — SPIRONOLACTONE 25 MG/1
25 TABLET ORAL
Qty: 180 TABLET | Refills: 3 | Status: SHIPPED | OUTPATIENT
Start: 2021-05-25 | End: 2022-05-27 | Stop reason: SDUPTHER

## 2021-05-28 ENCOUNTER — PATIENT MESSAGE (OUTPATIENT)
Dept: CARDIOLOGY | Facility: CLINIC | Age: 86
End: 2021-05-28

## 2021-06-14 ENCOUNTER — PATIENT MESSAGE (OUTPATIENT)
Dept: GASTROENTEROLOGY | Facility: CLINIC | Age: 86
End: 2021-06-14

## 2021-06-14 ENCOUNTER — CLINICAL SUPPORT (OUTPATIENT)
Dept: INTERNAL MEDICINE | Facility: CLINIC | Age: 86
End: 2021-06-14
Payer: MEDICARE

## 2021-06-14 DIAGNOSIS — J30.9 CHRONIC ALLERGIC RHINITIS: ICD-10-CM

## 2021-06-14 PROCEDURE — 95115 PR IMMUNOTHERAPY, ONE INJECTION: ICD-10-PCS | Mod: S$GLB,,, | Performed by: FAMILY MEDICINE

## 2021-06-14 PROCEDURE — 95115 IMMUNOTHERAPY ONE INJECTION: CPT | Mod: S$GLB,,, | Performed by: FAMILY MEDICINE

## 2021-06-14 PROCEDURE — 99499 NO LOS: ICD-10-PCS | Mod: S$GLB,,, | Performed by: ALLERGY & IMMUNOLOGY

## 2021-06-14 PROCEDURE — 99499 UNLISTED E&M SERVICE: CPT | Mod: S$GLB,,, | Performed by: ALLERGY & IMMUNOLOGY

## 2021-06-15 ENCOUNTER — PATIENT MESSAGE (OUTPATIENT)
Dept: GASTROENTEROLOGY | Facility: CLINIC | Age: 86
End: 2021-06-15

## 2021-06-15 RX ORDER — AMOXICILLIN 500 MG/1
2000 TABLET, FILM COATED ORAL ONCE
Qty: 4 TABLET | Refills: 0 | Status: SHIPPED | OUTPATIENT
Start: 2021-06-15 | End: 2022-12-07 | Stop reason: SDUPTHER

## 2021-06-16 ENCOUNTER — PATIENT MESSAGE (OUTPATIENT)
Dept: GASTROENTEROLOGY | Facility: CLINIC | Age: 86
End: 2021-06-16

## 2021-06-16 ENCOUNTER — TELEPHONE (OUTPATIENT)
Dept: ENDOSCOPY | Facility: HOSPITAL | Age: 86
End: 2021-06-16

## 2021-06-21 ENCOUNTER — CLINICAL SUPPORT (OUTPATIENT)
Dept: INTERNAL MEDICINE | Facility: CLINIC | Age: 86
End: 2021-06-21
Payer: MEDICARE

## 2021-06-21 DIAGNOSIS — J30.9 CHRONIC ALLERGIC RHINITIS: Primary | ICD-10-CM

## 2021-06-21 PROCEDURE — 99499 NO LOS: ICD-10-PCS | Mod: S$GLB,,, | Performed by: ALLERGY & IMMUNOLOGY

## 2021-06-21 PROCEDURE — 95115 IMMUNOTHERAPY ONE INJECTION: CPT | Mod: S$GLB,,, | Performed by: STUDENT IN AN ORGANIZED HEALTH CARE EDUCATION/TRAINING PROGRAM

## 2021-06-21 PROCEDURE — 99499 UNLISTED E&M SERVICE: CPT | Mod: S$GLB,,, | Performed by: ALLERGY & IMMUNOLOGY

## 2021-06-21 PROCEDURE — 95115 PR IMMUNOTHERAPY, ONE INJECTION: ICD-10-PCS | Mod: S$GLB,,, | Performed by: STUDENT IN AN ORGANIZED HEALTH CARE EDUCATION/TRAINING PROGRAM

## 2021-06-28 ENCOUNTER — CLINICAL SUPPORT (OUTPATIENT)
Dept: INTERNAL MEDICINE | Facility: CLINIC | Age: 86
End: 2021-06-28
Payer: MEDICARE

## 2021-06-28 ENCOUNTER — PATIENT MESSAGE (OUTPATIENT)
Dept: CARDIOLOGY | Facility: CLINIC | Age: 86
End: 2021-06-28

## 2021-06-28 DIAGNOSIS — J30.9 CHRONIC ALLERGIC RHINITIS: ICD-10-CM

## 2021-06-28 PROCEDURE — 95115 IMMUNOTHERAPY ONE INJECTION: CPT | Mod: S$GLB,,, | Performed by: FAMILY MEDICINE

## 2021-06-28 PROCEDURE — 95115 PR IMMUNOTHERAPY, ONE INJECTION: ICD-10-PCS | Mod: S$GLB,,, | Performed by: FAMILY MEDICINE

## 2021-06-28 PROCEDURE — 99499 NO LOS: ICD-10-PCS | Mod: S$GLB,,, | Performed by: ALLERGY & IMMUNOLOGY

## 2021-06-28 PROCEDURE — 99499 UNLISTED E&M SERVICE: CPT | Mod: S$GLB,,, | Performed by: ALLERGY & IMMUNOLOGY

## 2021-07-01 ENCOUNTER — TELEPHONE (OUTPATIENT)
Dept: CARDIOLOGY | Facility: CLINIC | Age: 86
End: 2021-07-01

## 2021-07-08 ENCOUNTER — PATIENT OUTREACH (OUTPATIENT)
Dept: ADMINISTRATIVE | Facility: OTHER | Age: 86
End: 2021-07-08

## 2021-07-08 DIAGNOSIS — E11.9 TYPE 2 DIABETES MELLITUS WITHOUT COMPLICATION, UNSPECIFIED WHETHER LONG TERM INSULIN USE: Primary | ICD-10-CM

## 2021-07-09 ENCOUNTER — OFFICE VISIT (OUTPATIENT)
Dept: CARDIOLOGY | Facility: CLINIC | Age: 86
End: 2021-07-09
Payer: MEDICARE

## 2021-07-09 ENCOUNTER — TELEPHONE (OUTPATIENT)
Dept: ALLERGY | Facility: CLINIC | Age: 86
End: 2021-07-09

## 2021-07-09 ENCOUNTER — CLINICAL SUPPORT (OUTPATIENT)
Dept: INTERNAL MEDICINE | Facility: CLINIC | Age: 86
End: 2021-07-09
Payer: MEDICARE

## 2021-07-09 VITALS
SYSTOLIC BLOOD PRESSURE: 145 MMHG | HEART RATE: 70 BPM | DIASTOLIC BLOOD PRESSURE: 66 MMHG | BODY MASS INDEX: 27.22 KG/M2 | HEIGHT: 65 IN | WEIGHT: 163.38 LBS

## 2021-07-09 DIAGNOSIS — J31.0 CHRONIC RHINITIS: ICD-10-CM

## 2021-07-09 DIAGNOSIS — I50.32 CHRONIC DIASTOLIC HEART FAILURE: Primary | Chronic | ICD-10-CM

## 2021-07-09 DIAGNOSIS — Z95.818 PRESENCE OF WATCHMAN LEFT ATRIAL APPENDAGE CLOSURE DEVICE: Chronic | ICD-10-CM

## 2021-07-09 DIAGNOSIS — E78.00 PURE HYPERCHOLESTEROLEMIA: Chronic | ICD-10-CM

## 2021-07-09 DIAGNOSIS — I70.0 ATHEROSCLEROSIS OF AORTA: Chronic | ICD-10-CM

## 2021-07-09 DIAGNOSIS — N18.31 STAGE 3A CHRONIC KIDNEY DISEASE: ICD-10-CM

## 2021-07-09 DIAGNOSIS — I48.20 CHRONIC ATRIAL FIBRILLATION: Chronic | ICD-10-CM

## 2021-07-09 DIAGNOSIS — I10 ESSENTIAL HYPERTENSION: Chronic | ICD-10-CM

## 2021-07-09 PROCEDURE — 1126F PR PAIN SEVERITY QUANTIFIED, NO PAIN PRESENT: ICD-10-PCS | Mod: S$GLB,,, | Performed by: INTERNAL MEDICINE

## 2021-07-09 PROCEDURE — 1126F AMNT PAIN NOTED NONE PRSNT: CPT | Mod: S$GLB,,, | Performed by: INTERNAL MEDICINE

## 2021-07-09 PROCEDURE — 99214 OFFICE O/P EST MOD 30 MIN: CPT | Mod: S$GLB,,, | Performed by: INTERNAL MEDICINE

## 2021-07-09 PROCEDURE — 3288F FALL RISK ASSESSMENT DOCD: CPT | Mod: CPTII,S$GLB,, | Performed by: INTERNAL MEDICINE

## 2021-07-09 PROCEDURE — 99999 PR PBB SHADOW E&M-EST. PATIENT-LVL III: CPT | Mod: PBBFAC,,, | Performed by: INTERNAL MEDICINE

## 2021-07-09 PROCEDURE — 1159F PR MEDICATION LIST DOCUMENTED IN MEDICAL RECORD: ICD-10-PCS | Mod: S$GLB,,, | Performed by: INTERNAL MEDICINE

## 2021-07-09 PROCEDURE — 1159F MED LIST DOCD IN RCRD: CPT | Mod: S$GLB,,, | Performed by: INTERNAL MEDICINE

## 2021-07-09 PROCEDURE — 3288F PR FALLS RISK ASSESSMENT DOCUMENTED: ICD-10-PCS | Mod: CPTII,S$GLB,, | Performed by: INTERNAL MEDICINE

## 2021-07-09 PROCEDURE — 99499 NO LOS: ICD-10-PCS | Mod: S$GLB,,, | Performed by: ALLERGY & IMMUNOLOGY

## 2021-07-09 PROCEDURE — 1101F PT FALLS ASSESS-DOCD LE1/YR: CPT | Mod: CPTII,S$GLB,, | Performed by: INTERNAL MEDICINE

## 2021-07-09 PROCEDURE — 99499 UNLISTED E&M SERVICE: CPT | Mod: S$GLB,,, | Performed by: ALLERGY & IMMUNOLOGY

## 2021-07-09 PROCEDURE — 1101F PR PT FALLS ASSESS DOC 0-1 FALLS W/OUT INJ PAST YR: ICD-10-PCS | Mod: CPTII,S$GLB,, | Performed by: INTERNAL MEDICINE

## 2021-07-09 PROCEDURE — 99999 PR PBB SHADOW E&M-EST. PATIENT-LVL III: ICD-10-PCS | Mod: PBBFAC,,, | Performed by: INTERNAL MEDICINE

## 2021-07-09 PROCEDURE — 95115 IMMUNOTHERAPY ONE INJECTION: CPT | Mod: S$GLB,,, | Performed by: STUDENT IN AN ORGANIZED HEALTH CARE EDUCATION/TRAINING PROGRAM

## 2021-07-09 PROCEDURE — 99214 PR OFFICE/OUTPT VISIT, EST, LEVL IV, 30-39 MIN: ICD-10-PCS | Mod: S$GLB,,, | Performed by: INTERNAL MEDICINE

## 2021-07-09 PROCEDURE — 95115 PR IMMUNOTHERAPY, ONE INJECTION: ICD-10-PCS | Mod: S$GLB,,, | Performed by: STUDENT IN AN ORGANIZED HEALTH CARE EDUCATION/TRAINING PROGRAM

## 2021-07-14 ENCOUNTER — LAB VISIT (OUTPATIENT)
Dept: LAB | Facility: HOSPITAL | Age: 86
End: 2021-07-14
Payer: MEDICARE

## 2021-07-14 ENCOUNTER — OFFICE VISIT (OUTPATIENT)
Dept: NEUROLOGY | Facility: CLINIC | Age: 86
End: 2021-07-14
Payer: MEDICARE

## 2021-07-14 ENCOUNTER — OFFICE VISIT (OUTPATIENT)
Dept: PODIATRY | Facility: CLINIC | Age: 86
End: 2021-07-14
Payer: MEDICARE

## 2021-07-14 VITALS
SYSTOLIC BLOOD PRESSURE: 155 MMHG | DIASTOLIC BLOOD PRESSURE: 75 MMHG | BODY MASS INDEX: 25.49 KG/M2 | HEIGHT: 65 IN | RESPIRATION RATE: 18 BRPM | WEIGHT: 153 LBS | HEART RATE: 69 BPM

## 2021-07-14 VITALS
HEART RATE: 82 BPM | WEIGHT: 153.56 LBS | SYSTOLIC BLOOD PRESSURE: 159 MMHG | HEIGHT: 65 IN | BODY MASS INDEX: 25.58 KG/M2 | DIASTOLIC BLOOD PRESSURE: 78 MMHG

## 2021-07-14 DIAGNOSIS — R51.9 NONINTRACTABLE HEADACHE, UNSPECIFIED CHRONICITY PATTERN, UNSPECIFIED HEADACHE TYPE: ICD-10-CM

## 2021-07-14 DIAGNOSIS — G89.29 CHRONIC NONINTRACTABLE HEADACHE, UNSPECIFIED HEADACHE TYPE: ICD-10-CM

## 2021-07-14 DIAGNOSIS — G60.9 IDIOPATHIC PERIPHERAL NEUROPATHY: ICD-10-CM

## 2021-07-14 DIAGNOSIS — M79.676 PAIN OF GREAT TOE, UNSPECIFIED LATERALITY: ICD-10-CM

## 2021-07-14 DIAGNOSIS — R51.9 CHRONIC NONINTRACTABLE HEADACHE, UNSPECIFIED HEADACHE TYPE: Primary | ICD-10-CM

## 2021-07-14 DIAGNOSIS — G89.29 CHRONIC NONINTRACTABLE HEADACHE, UNSPECIFIED HEADACHE TYPE: Primary | ICD-10-CM

## 2021-07-14 DIAGNOSIS — R27.0 ATAXIA, UNSPECIFIED: ICD-10-CM

## 2021-07-14 DIAGNOSIS — R51.9 CHRONIC NONINTRACTABLE HEADACHE, UNSPECIFIED HEADACHE TYPE: ICD-10-CM

## 2021-07-14 DIAGNOSIS — B35.1 ONYCHOMYCOSIS DUE TO DERMATOPHYTE: ICD-10-CM

## 2021-07-14 DIAGNOSIS — L60.0 INCURVED TOENAIL: Primary | ICD-10-CM

## 2021-07-14 LAB
ALBUMIN SERPL BCP-MCNC: 4.2 G/DL (ref 3.5–5.2)
ALP SERPL-CCNC: 122 U/L (ref 55–135)
ALT SERPL W/O P-5'-P-CCNC: 10 U/L (ref 10–44)
ANION GAP SERPL CALC-SCNC: 12 MMOL/L (ref 8–16)
AST SERPL-CCNC: 18 U/L (ref 10–40)
BILIRUB SERPL-MCNC: 0.7 MG/DL (ref 0.1–1)
BUN SERPL-MCNC: 18 MG/DL (ref 8–23)
CALCIUM SERPL-MCNC: 9.6 MG/DL (ref 8.7–10.5)
CHLORIDE SERPL-SCNC: 98 MMOL/L (ref 95–110)
CO2 SERPL-SCNC: 27 MMOL/L (ref 23–29)
CREAT SERPL-MCNC: 1.1 MG/DL (ref 0.5–1.4)
CRP SERPL-MCNC: 1.2 MG/L (ref 0–8.2)
ERYTHROCYTE [SEDIMENTATION RATE] IN BLOOD BY WESTERGREN METHOD: 22 MM/HR (ref 0–36)
EST. GFR  (AFRICAN AMERICAN): 51.4 ML/MIN/1.73 M^2
EST. GFR  (NON AFRICAN AMERICAN): 44.6 ML/MIN/1.73 M^2
GLUCOSE SERPL-MCNC: 83 MG/DL (ref 70–110)
POTASSIUM SERPL-SCNC: 4 MMOL/L (ref 3.5–5.1)
PROT SERPL-MCNC: 7.3 G/DL (ref 6–8.4)
SODIUM SERPL-SCNC: 137 MMOL/L (ref 136–145)

## 2021-07-14 PROCEDURE — 3288F FALL RISK ASSESSMENT DOCD: CPT | Mod: CPTII,S$GLB,, | Performed by: PHYSICIAN ASSISTANT

## 2021-07-14 PROCEDURE — 99215 PR OFFICE/OUTPT VISIT, EST, LEVL V, 40-54 MIN: ICD-10-PCS | Mod: S$GLB,,, | Performed by: PHYSICIAN ASSISTANT

## 2021-07-14 PROCEDURE — 99213 OFFICE O/P EST LOW 20 MIN: CPT | Mod: 25,S$GLB,, | Performed by: PODIATRIST

## 2021-07-14 PROCEDURE — 86140 C-REACTIVE PROTEIN: CPT | Performed by: PHYSICIAN ASSISTANT

## 2021-07-14 PROCEDURE — 11721 DEBRIDE NAIL 6 OR MORE: CPT | Mod: Q9,S$GLB,, | Performed by: PODIATRIST

## 2021-07-14 PROCEDURE — 99999 PR PBB SHADOW E&M-EST. PATIENT-LVL III: CPT | Mod: PBBFAC,,, | Performed by: PODIATRIST

## 2021-07-14 PROCEDURE — 99499 UNLISTED E&M SERVICE: CPT | Mod: HCNC,S$GLB,, | Performed by: PHYSICIAN ASSISTANT

## 2021-07-14 PROCEDURE — 1101F PT FALLS ASSESS-DOCD LE1/YR: CPT | Mod: CPTII,S$GLB,, | Performed by: PHYSICIAN ASSISTANT

## 2021-07-14 PROCEDURE — 85652 RBC SED RATE AUTOMATED: CPT | Performed by: PHYSICIAN ASSISTANT

## 2021-07-14 PROCEDURE — 1126F PR PAIN SEVERITY QUANTIFIED, NO PAIN PRESENT: ICD-10-PCS | Mod: S$GLB,,, | Performed by: PHYSICIAN ASSISTANT

## 2021-07-14 PROCEDURE — 36415 COLL VENOUS BLD VENIPUNCTURE: CPT | Performed by: PHYSICIAN ASSISTANT

## 2021-07-14 PROCEDURE — 99999 PR PBB SHADOW E&M-EST. PATIENT-LVL IV: CPT | Mod: PBBFAC,,, | Performed by: PHYSICIAN ASSISTANT

## 2021-07-14 PROCEDURE — 99499 RISK ADDL DX/OHS AUDIT: ICD-10-PCS | Mod: HCNC,S$GLB,, | Performed by: PHYSICIAN ASSISTANT

## 2021-07-14 PROCEDURE — 1101F PR PT FALLS ASSESS DOC 0-1 FALLS W/OUT INJ PAST YR: ICD-10-PCS | Mod: CPTII,S$GLB,, | Performed by: PHYSICIAN ASSISTANT

## 2021-07-14 PROCEDURE — 99215 OFFICE O/P EST HI 40 MIN: CPT | Mod: S$GLB,,, | Performed by: PHYSICIAN ASSISTANT

## 2021-07-14 PROCEDURE — 1125F AMNT PAIN NOTED PAIN PRSNT: CPT | Mod: CPTII,S$GLB,, | Performed by: PODIATRIST

## 2021-07-14 PROCEDURE — 80053 COMPREHEN METABOLIC PANEL: CPT | Performed by: PHYSICIAN ASSISTANT

## 2021-07-14 PROCEDURE — 99999 PR PBB SHADOW E&M-EST. PATIENT-LVL IV: ICD-10-PCS | Mod: PBBFAC,,, | Performed by: PHYSICIAN ASSISTANT

## 2021-07-14 PROCEDURE — 99999 PR PBB SHADOW E&M-EST. PATIENT-LVL III: ICD-10-PCS | Mod: PBBFAC,,, | Performed by: PODIATRIST

## 2021-07-14 PROCEDURE — 1125F PR PAIN SEVERITY QUANTIFIED, PAIN PRESENT: ICD-10-PCS | Mod: CPTII,S$GLB,, | Performed by: PODIATRIST

## 2021-07-14 PROCEDURE — 11721 PR DEBRIDEMENT OF NAILS, 6 OR MORE: ICD-10-PCS | Mod: Q9,S$GLB,, | Performed by: PODIATRIST

## 2021-07-14 PROCEDURE — 1159F MED LIST DOCD IN RCRD: CPT | Mod: CPTII,S$GLB,, | Performed by: PODIATRIST

## 2021-07-14 PROCEDURE — 99213 PR OFFICE/OUTPT VISIT, EST, LEVL III, 20-29 MIN: ICD-10-PCS | Mod: 25,S$GLB,, | Performed by: PODIATRIST

## 2021-07-14 PROCEDURE — 1159F PR MEDICATION LIST DOCUMENTED IN MEDICAL RECORD: ICD-10-PCS | Mod: CPTII,S$GLB,, | Performed by: PODIATRIST

## 2021-07-14 PROCEDURE — 1126F AMNT PAIN NOTED NONE PRSNT: CPT | Mod: S$GLB,,, | Performed by: PHYSICIAN ASSISTANT

## 2021-07-14 PROCEDURE — 3288F PR FALLS RISK ASSESSMENT DOCUMENTED: ICD-10-PCS | Mod: CPTII,S$GLB,, | Performed by: PHYSICIAN ASSISTANT

## 2021-07-14 PROCEDURE — 1159F MED LIST DOCD IN RCRD: CPT | Mod: S$GLB,,, | Performed by: PHYSICIAN ASSISTANT

## 2021-07-14 PROCEDURE — 1159F PR MEDICATION LIST DOCUMENTED IN MEDICAL RECORD: ICD-10-PCS | Mod: S$GLB,,, | Performed by: PHYSICIAN ASSISTANT

## 2021-07-15 ENCOUNTER — PATIENT MESSAGE (OUTPATIENT)
Dept: NEUROLOGY | Facility: CLINIC | Age: 86
End: 2021-07-15

## 2021-07-16 ENCOUNTER — CLINICAL SUPPORT (OUTPATIENT)
Dept: INTERNAL MEDICINE | Facility: CLINIC | Age: 86
End: 2021-07-16
Payer: MEDICARE

## 2021-07-16 ENCOUNTER — TELEPHONE (OUTPATIENT)
Dept: NEUROLOGY | Facility: CLINIC | Age: 86
End: 2021-07-16

## 2021-07-16 DIAGNOSIS — J31.0 CHRONIC RHINITIS: ICD-10-CM

## 2021-07-16 PROCEDURE — 99499 NO LOS: ICD-10-PCS | Mod: S$GLB,,, | Performed by: ALLERGY & IMMUNOLOGY

## 2021-07-16 PROCEDURE — 95115 PR IMMUNOTHERAPY, ONE INJECTION: ICD-10-PCS | Mod: S$GLB,,, | Performed by: FAMILY MEDICINE

## 2021-07-16 PROCEDURE — 95115 IMMUNOTHERAPY ONE INJECTION: CPT | Mod: S$GLB,,, | Performed by: FAMILY MEDICINE

## 2021-07-16 PROCEDURE — 99499 UNLISTED E&M SERVICE: CPT | Mod: S$GLB,,, | Performed by: ALLERGY & IMMUNOLOGY

## 2021-07-26 ENCOUNTER — CLINICAL SUPPORT (OUTPATIENT)
Dept: INTERNAL MEDICINE | Facility: CLINIC | Age: 86
End: 2021-07-26
Payer: MEDICARE

## 2021-07-26 DIAGNOSIS — J30.1 ALLERGIC RHINITIS DUE TO POLLEN, UNSPECIFIED SEASONALITY: Primary | ICD-10-CM

## 2021-07-26 PROCEDURE — 99499 UNLISTED E&M SERVICE: CPT | Mod: S$GLB,,, | Performed by: ALLERGY & IMMUNOLOGY

## 2021-07-26 PROCEDURE — 95115 IMMUNOTHERAPY ONE INJECTION: CPT | Mod: S$GLB,,, | Performed by: FAMILY MEDICINE

## 2021-07-26 PROCEDURE — 99499 NO LOS: ICD-10-PCS | Mod: S$GLB,,, | Performed by: ALLERGY & IMMUNOLOGY

## 2021-07-26 PROCEDURE — 95115 PR IMMUNOTHERAPY, ONE INJECTION: ICD-10-PCS | Mod: S$GLB,,, | Performed by: FAMILY MEDICINE

## 2021-08-02 ENCOUNTER — HOSPITAL ENCOUNTER (OUTPATIENT)
Dept: RADIOLOGY | Facility: HOSPITAL | Age: 86
Discharge: HOME | End: 2021-08-02
Attending: PHYSICIAN ASSISTANT
Payer: MEDICARE

## 2021-08-02 DIAGNOSIS — R51.9 NONINTRACTABLE HEADACHE, UNSPECIFIED CHRONICITY PATTERN, UNSPECIFIED HEADACHE TYPE: ICD-10-CM

## 2021-08-02 DIAGNOSIS — R27.0 ATAXIA, UNSPECIFIED: ICD-10-CM

## 2021-08-02 PROCEDURE — A9585 GADOBUTROL INJECTION: HCPCS | Performed by: PHYSICIAN ASSISTANT

## 2021-08-02 PROCEDURE — 70553 MRI BRAIN STEM W/O & W/DYE: CPT | Mod: 26,,, | Performed by: RADIOLOGY

## 2021-08-02 PROCEDURE — 25500020 PHARM REV CODE 255: Performed by: PHYSICIAN ASSISTANT

## 2021-08-02 PROCEDURE — 70553 MRI BRAIN STEM W/O & W/DYE: CPT | Mod: TC

## 2021-08-02 PROCEDURE — 70553 MRI BRAIN W WO CONTRAST: ICD-10-PCS | Mod: 26,,, | Performed by: RADIOLOGY

## 2021-08-02 RX ORDER — GADOBUTROL 604.72 MG/ML
7 INJECTION INTRAVENOUS
Status: COMPLETED | OUTPATIENT
Start: 2021-08-02 | End: 2021-08-02

## 2021-08-02 RX ADMIN — GADOBUTROL 7 ML: 604.72 INJECTION INTRAVENOUS at 12:08

## 2021-08-03 ENCOUNTER — PATIENT MESSAGE (OUTPATIENT)
Dept: ADMINISTRATIVE | Facility: HOSPITAL | Age: 86
End: 2021-08-03

## 2021-08-03 ENCOUNTER — PES CALL (OUTPATIENT)
Dept: ADMINISTRATIVE | Facility: CLINIC | Age: 86
End: 2021-08-03

## 2021-08-03 ENCOUNTER — PATIENT MESSAGE (OUTPATIENT)
Dept: INTERNAL MEDICINE | Facility: CLINIC | Age: 86
End: 2021-08-03

## 2021-08-03 ENCOUNTER — TELEPHONE (OUTPATIENT)
Dept: INTERNAL MEDICINE | Facility: CLINIC | Age: 86
End: 2021-08-03

## 2021-08-04 ENCOUNTER — CLINICAL SUPPORT (OUTPATIENT)
Dept: INTERNAL MEDICINE | Facility: CLINIC | Age: 86
End: 2021-08-04
Payer: MEDICARE

## 2021-08-04 DIAGNOSIS — J30.1 ALLERGIC RHINITIS DUE TO POLLEN, UNSPECIFIED SEASONALITY: Primary | ICD-10-CM

## 2021-08-04 PROCEDURE — 99499 UNLISTED E&M SERVICE: CPT | Mod: S$GLB,,, | Performed by: ALLERGY & IMMUNOLOGY

## 2021-08-04 PROCEDURE — 99999 PR PBB SHADOW E&M-EST. PATIENT-LVL I: CPT | Mod: PBBFAC,,,

## 2021-08-04 PROCEDURE — 95115 PR IMMUNOTHERAPY, ONE INJECTION: ICD-10-PCS | Mod: S$GLB,,, | Performed by: FAMILY MEDICINE

## 2021-08-04 PROCEDURE — 99499 NO LOS: ICD-10-PCS | Mod: S$GLB,,, | Performed by: ALLERGY & IMMUNOLOGY

## 2021-08-04 PROCEDURE — 95115 IMMUNOTHERAPY ONE INJECTION: CPT | Mod: S$GLB,,, | Performed by: FAMILY MEDICINE

## 2021-08-04 PROCEDURE — 99999 PR PBB SHADOW E&M-EST. PATIENT-LVL I: ICD-10-PCS | Mod: PBBFAC,,,

## 2021-08-05 ENCOUNTER — PATIENT MESSAGE (OUTPATIENT)
Dept: CARDIOLOGY | Facility: CLINIC | Age: 86
End: 2021-08-05

## 2021-08-10 ENCOUNTER — OFFICE VISIT (OUTPATIENT)
Dept: NEUROLOGY | Facility: CLINIC | Age: 86
End: 2021-08-10
Payer: MEDICARE

## 2021-08-10 VITALS
BODY MASS INDEX: 20.37 KG/M2 | HEART RATE: 84 BPM | SYSTOLIC BLOOD PRESSURE: 114 MMHG | DIASTOLIC BLOOD PRESSURE: 62 MMHG | HEIGHT: 65 IN | WEIGHT: 122.25 LBS

## 2021-08-10 DIAGNOSIS — Z86.69 HISTORY OF MENIERE'S DISEASE: ICD-10-CM

## 2021-08-10 DIAGNOSIS — G44.86 CERVICOGENIC HEADACHE: Primary | ICD-10-CM

## 2021-08-10 DIAGNOSIS — R26.89 IMBALANCE: ICD-10-CM

## 2021-08-10 DIAGNOSIS — H91.90 HEARING LOSS, UNSPECIFIED HEARING LOSS TYPE, UNSPECIFIED LATERALITY: ICD-10-CM

## 2021-08-10 PROCEDURE — 1101F PT FALLS ASSESS-DOCD LE1/YR: CPT | Mod: CPTII,S$GLB,, | Performed by: PHYSICIAN ASSISTANT

## 2021-08-10 PROCEDURE — 99499 UNLISTED E&M SERVICE: CPT | Mod: HCNC,S$GLB,, | Performed by: PHYSICIAN ASSISTANT

## 2021-08-10 PROCEDURE — 1126F PR PAIN SEVERITY QUANTIFIED, NO PAIN PRESENT: ICD-10-PCS | Mod: CPTII,S$GLB,, | Performed by: PHYSICIAN ASSISTANT

## 2021-08-10 PROCEDURE — 3288F FALL RISK ASSESSMENT DOCD: CPT | Mod: CPTII,S$GLB,, | Performed by: PHYSICIAN ASSISTANT

## 2021-08-10 PROCEDURE — 1159F PR MEDICATION LIST DOCUMENTED IN MEDICAL RECORD: ICD-10-PCS | Mod: CPTII,S$GLB,, | Performed by: PHYSICIAN ASSISTANT

## 2021-08-10 PROCEDURE — 99499 RISK ADDL DX/OHS AUDIT: ICD-10-PCS | Mod: HCNC,S$GLB,, | Performed by: PHYSICIAN ASSISTANT

## 2021-08-10 PROCEDURE — 99999 PR PBB SHADOW E&M-EST. PATIENT-LVL IV: CPT | Mod: PBBFAC,,, | Performed by: PHYSICIAN ASSISTANT

## 2021-08-10 PROCEDURE — 99214 PR OFFICE/OUTPT VISIT, EST, LEVL IV, 30-39 MIN: ICD-10-PCS | Mod: S$GLB,,, | Performed by: PHYSICIAN ASSISTANT

## 2021-08-10 PROCEDURE — 3288F PR FALLS RISK ASSESSMENT DOCUMENTED: ICD-10-PCS | Mod: CPTII,S$GLB,, | Performed by: PHYSICIAN ASSISTANT

## 2021-08-10 PROCEDURE — 99999 PR PBB SHADOW E&M-EST. PATIENT-LVL IV: ICD-10-PCS | Mod: PBBFAC,,, | Performed by: PHYSICIAN ASSISTANT

## 2021-08-10 PROCEDURE — 99214 OFFICE O/P EST MOD 30 MIN: CPT | Mod: S$GLB,,, | Performed by: PHYSICIAN ASSISTANT

## 2021-08-10 PROCEDURE — 1101F PR PT FALLS ASSESS DOC 0-1 FALLS W/OUT INJ PAST YR: ICD-10-PCS | Mod: CPTII,S$GLB,, | Performed by: PHYSICIAN ASSISTANT

## 2021-08-10 PROCEDURE — 1159F MED LIST DOCD IN RCRD: CPT | Mod: CPTII,S$GLB,, | Performed by: PHYSICIAN ASSISTANT

## 2021-08-10 PROCEDURE — 1126F AMNT PAIN NOTED NONE PRSNT: CPT | Mod: CPTII,S$GLB,, | Performed by: PHYSICIAN ASSISTANT

## 2021-08-10 PROCEDURE — 1160F RVW MEDS BY RX/DR IN RCRD: CPT | Mod: CPTII,S$GLB,, | Performed by: PHYSICIAN ASSISTANT

## 2021-08-10 PROCEDURE — 1160F PR REVIEW ALL MEDS BY PRESCRIBER/CLIN PHARMACIST DOCUMENTED: ICD-10-PCS | Mod: CPTII,S$GLB,, | Performed by: PHYSICIAN ASSISTANT

## 2021-08-10 RX ORDER — BROMPHENIRAMINE MALEATE, DEXTROMETHORPHAN HBR, PHENYLEPHRINE HCL, DIPHENHYDRAMINE HCL, PHENYLEPHRINE HCL 0.52G
9 KIT ORAL DAILY
Status: ON HOLD | COMMUNITY
End: 2022-11-08

## 2021-08-11 ENCOUNTER — TELEPHONE (OUTPATIENT)
Dept: NEUROLOGY | Facility: CLINIC | Age: 86
End: 2021-08-11

## 2021-08-13 ENCOUNTER — CLINICAL SUPPORT (OUTPATIENT)
Dept: INTERNAL MEDICINE | Facility: CLINIC | Age: 86
End: 2021-08-13
Payer: MEDICARE

## 2021-08-13 DIAGNOSIS — J30.1 ALLERGIC RHINITIS DUE TO POLLEN, UNSPECIFIED SEASONALITY: Primary | ICD-10-CM

## 2021-08-13 PROCEDURE — 95115 IMMUNOTHERAPY ONE INJECTION: CPT | Mod: S$GLB,,, | Performed by: FAMILY MEDICINE

## 2021-08-13 PROCEDURE — 99499 UNLISTED E&M SERVICE: CPT | Mod: S$GLB,,, | Performed by: ALLERGY & IMMUNOLOGY

## 2021-08-13 PROCEDURE — 99499 NO LOS: ICD-10-PCS | Mod: S$GLB,,, | Performed by: ALLERGY & IMMUNOLOGY

## 2021-08-13 PROCEDURE — 95115 PR IMMUNOTHERAPY, ONE INJECTION: ICD-10-PCS | Mod: S$GLB,,, | Performed by: FAMILY MEDICINE

## 2021-08-15 ENCOUNTER — PATIENT OUTREACH (OUTPATIENT)
Dept: ADMINISTRATIVE | Facility: OTHER | Age: 86
End: 2021-08-15

## 2021-08-17 ENCOUNTER — OFFICE VISIT (OUTPATIENT)
Dept: OPHTHALMOLOGY | Facility: CLINIC | Age: 86
End: 2021-08-17
Payer: MEDICARE

## 2021-08-17 DIAGNOSIS — H43.813 POSTERIOR VITREOUS DETACHMENT, BILATERAL: Primary | ICD-10-CM

## 2021-08-17 DIAGNOSIS — H35.033 HYPERTENSIVE RETINOPATHY OF BOTH EYES: ICD-10-CM

## 2021-08-17 DIAGNOSIS — H34.8122 STABLE CENTRAL RETINAL VEIN OCCLUSION OF LEFT EYE: ICD-10-CM

## 2021-08-17 DIAGNOSIS — H35.3221 EXUDATIVE AGE-RELATED MACULAR DEGENERATION OF LEFT EYE WITH ACTIVE CHOROIDAL NEOVASCULARIZATION: ICD-10-CM

## 2021-08-17 PROCEDURE — 92134 POSTERIOR SEGMENT OCT RETINA (OCULAR COHERENCE TOMOGRAPHY)-BOTH EYES: ICD-10-PCS | Mod: S$GLB,,, | Performed by: OPHTHALMOLOGY

## 2021-08-17 PROCEDURE — 1160F RVW MEDS BY RX/DR IN RCRD: CPT | Mod: CPTII,S$GLB,, | Performed by: OPHTHALMOLOGY

## 2021-08-17 PROCEDURE — 92201 PR OPHTHALMOSCOPY, EXT, W/RET DRAW/SCLERAL DEPR, I&R, UNI/BI: ICD-10-PCS | Mod: S$GLB,,, | Performed by: OPHTHALMOLOGY

## 2021-08-17 PROCEDURE — 1126F AMNT PAIN NOTED NONE PRSNT: CPT | Mod: CPTII,S$GLB,, | Performed by: OPHTHALMOLOGY

## 2021-08-17 PROCEDURE — 1101F PR PT FALLS ASSESS DOC 0-1 FALLS W/OUT INJ PAST YR: ICD-10-PCS | Mod: CPTII,S$GLB,, | Performed by: OPHTHALMOLOGY

## 2021-08-17 PROCEDURE — 99999 PR PBB SHADOW E&M-EST. PATIENT-LVL III: ICD-10-PCS | Mod: PBBFAC,,, | Performed by: OPHTHALMOLOGY

## 2021-08-17 PROCEDURE — 1159F PR MEDICATION LIST DOCUMENTED IN MEDICAL RECORD: ICD-10-PCS | Mod: CPTII,S$GLB,, | Performed by: OPHTHALMOLOGY

## 2021-08-17 PROCEDURE — 1101F PT FALLS ASSESS-DOCD LE1/YR: CPT | Mod: CPTII,S$GLB,, | Performed by: OPHTHALMOLOGY

## 2021-08-17 PROCEDURE — 1126F PR PAIN SEVERITY QUANTIFIED, NO PAIN PRESENT: ICD-10-PCS | Mod: CPTII,S$GLB,, | Performed by: OPHTHALMOLOGY

## 2021-08-17 PROCEDURE — 1160F PR REVIEW ALL MEDS BY PRESCRIBER/CLIN PHARMACIST DOCUMENTED: ICD-10-PCS | Mod: CPTII,S$GLB,, | Performed by: OPHTHALMOLOGY

## 2021-08-17 PROCEDURE — 3288F FALL RISK ASSESSMENT DOCD: CPT | Mod: CPTII,S$GLB,, | Performed by: OPHTHALMOLOGY

## 2021-08-17 PROCEDURE — 92014 COMPRE OPH EXAM EST PT 1/>: CPT | Mod: S$GLB,,, | Performed by: OPHTHALMOLOGY

## 2021-08-17 PROCEDURE — 3288F PR FALLS RISK ASSESSMENT DOCUMENTED: ICD-10-PCS | Mod: CPTII,S$GLB,, | Performed by: OPHTHALMOLOGY

## 2021-08-17 PROCEDURE — 92134 CPTRZ OPH DX IMG PST SGM RTA: CPT | Mod: S$GLB,,, | Performed by: OPHTHALMOLOGY

## 2021-08-17 PROCEDURE — 92201 OPSCPY EXTND RTA DRAW UNI/BI: CPT | Mod: S$GLB,,, | Performed by: OPHTHALMOLOGY

## 2021-08-17 PROCEDURE — 1159F MED LIST DOCD IN RCRD: CPT | Mod: CPTII,S$GLB,, | Performed by: OPHTHALMOLOGY

## 2021-08-17 PROCEDURE — 99999 PR PBB SHADOW E&M-EST. PATIENT-LVL III: CPT | Mod: PBBFAC,,, | Performed by: OPHTHALMOLOGY

## 2021-08-17 PROCEDURE — 92014 PR EYE EXAM, EST PATIENT,COMPREHESV: ICD-10-PCS | Mod: S$GLB,,, | Performed by: OPHTHALMOLOGY

## 2021-08-23 ENCOUNTER — CLINICAL SUPPORT (OUTPATIENT)
Dept: INTERNAL MEDICINE | Facility: CLINIC | Age: 86
End: 2021-08-23
Payer: MEDICARE

## 2021-08-23 DIAGNOSIS — J30.1 ALLERGIC RHINITIS DUE TO POLLEN, UNSPECIFIED SEASONALITY: Primary | ICD-10-CM

## 2021-08-23 PROCEDURE — 99499 NO LOS: ICD-10-PCS | Mod: S$GLB,,, | Performed by: ALLERGY & IMMUNOLOGY

## 2021-08-23 PROCEDURE — 99499 UNLISTED E&M SERVICE: CPT | Mod: S$GLB,,, | Performed by: ALLERGY & IMMUNOLOGY

## 2021-08-23 PROCEDURE — 95115 PR IMMUNOTHERAPY, ONE INJECTION: ICD-10-PCS | Mod: S$GLB,,, | Performed by: FAMILY MEDICINE

## 2021-08-23 PROCEDURE — 95115 IMMUNOTHERAPY ONE INJECTION: CPT | Mod: S$GLB,,, | Performed by: FAMILY MEDICINE

## 2021-09-03 ENCOUNTER — PATIENT MESSAGE (OUTPATIENT)
Dept: NEUROLOGY | Facility: CLINIC | Age: 86
End: 2021-09-03

## 2021-09-09 ENCOUNTER — TELEPHONE (OUTPATIENT)
Dept: NEUROLOGY | Facility: CLINIC | Age: 86
End: 2021-09-09

## 2021-09-09 ENCOUNTER — TELEPHONE (OUTPATIENT)
Dept: OTOLARYNGOLOGY | Facility: CLINIC | Age: 86
End: 2021-09-09

## 2021-09-10 ENCOUNTER — TELEPHONE (OUTPATIENT)
Dept: INTERNAL MEDICINE | Facility: CLINIC | Age: 86
End: 2021-09-10

## 2021-09-10 ENCOUNTER — PATIENT MESSAGE (OUTPATIENT)
Dept: ALLERGY | Facility: CLINIC | Age: 86
End: 2021-09-10

## 2021-09-10 ENCOUNTER — TELEPHONE (OUTPATIENT)
Dept: NEUROLOGY | Facility: CLINIC | Age: 86
End: 2021-09-10

## 2021-09-14 ENCOUNTER — TELEPHONE (OUTPATIENT)
Dept: INTERNAL MEDICINE | Facility: CLINIC | Age: 86
End: 2021-09-14

## 2021-09-15 ENCOUNTER — PES CALL (OUTPATIENT)
Dept: ADMINISTRATIVE | Facility: CLINIC | Age: 86
End: 2021-09-15

## 2021-09-20 ENCOUNTER — OFFICE VISIT (OUTPATIENT)
Dept: OTOLARYNGOLOGY | Facility: CLINIC | Age: 86
End: 2021-09-20
Payer: MEDICARE

## 2021-09-20 ENCOUNTER — TELEPHONE (OUTPATIENT)
Dept: PHARMACY | Facility: CLINIC | Age: 86
End: 2021-09-20

## 2021-09-20 VITALS — WEIGHT: 165.38 LBS | BODY MASS INDEX: 27.51 KG/M2

## 2021-09-20 DIAGNOSIS — R26.89 IMBALANCE: ICD-10-CM

## 2021-09-20 DIAGNOSIS — H61.23 BILATERAL IMPACTED CERUMEN: Primary | ICD-10-CM

## 2021-09-20 DIAGNOSIS — Z86.69 HISTORY OF MENIERE'S DISEASE: ICD-10-CM

## 2021-09-20 DIAGNOSIS — H91.90 HEARING LOSS, UNSPECIFIED HEARING LOSS TYPE, UNSPECIFIED LATERALITY: ICD-10-CM

## 2021-09-20 PROCEDURE — 99212 OFFICE O/P EST SF 10 MIN: CPT | Mod: 25,HCNC,S$GLB, | Performed by: OTOLARYNGOLOGY

## 2021-09-20 PROCEDURE — 1126F AMNT PAIN NOTED NONE PRSNT: CPT | Mod: HCNC,CPTII,S$GLB, | Performed by: OTOLARYNGOLOGY

## 2021-09-20 PROCEDURE — 1159F PR MEDICATION LIST DOCUMENTED IN MEDICAL RECORD: ICD-10-PCS | Mod: HCNC,CPTII,S$GLB, | Performed by: OTOLARYNGOLOGY

## 2021-09-20 PROCEDURE — 69210 REMOVE IMPACTED EAR WAX UNI: CPT | Mod: HCNC,S$GLB,, | Performed by: OTOLARYNGOLOGY

## 2021-09-20 PROCEDURE — 1126F PR PAIN SEVERITY QUANTIFIED, NO PAIN PRESENT: ICD-10-PCS | Mod: HCNC,CPTII,S$GLB, | Performed by: OTOLARYNGOLOGY

## 2021-09-20 PROCEDURE — 99212 PR OFFICE/OUTPT VISIT, EST, LEVL II, 10-19 MIN: ICD-10-PCS | Mod: 25,HCNC,S$GLB, | Performed by: OTOLARYNGOLOGY

## 2021-09-20 PROCEDURE — 69210 PR REMOVAL IMPACTED CERUMEN REQUIRING INSTRUMENTATION, UNILATERAL: ICD-10-PCS | Mod: HCNC,S$GLB,, | Performed by: OTOLARYNGOLOGY

## 2021-09-20 PROCEDURE — 99999 PR PBB SHADOW E&M-EST. PATIENT-LVL III: CPT | Mod: PBBFAC,HCNC,, | Performed by: OTOLARYNGOLOGY

## 2021-09-20 PROCEDURE — 99999 PR PBB SHADOW E&M-EST. PATIENT-LVL III: ICD-10-PCS | Mod: PBBFAC,HCNC,, | Performed by: OTOLARYNGOLOGY

## 2021-09-20 PROCEDURE — 1159F MED LIST DOCD IN RCRD: CPT | Mod: HCNC,CPTII,S$GLB, | Performed by: OTOLARYNGOLOGY

## 2021-09-21 ENCOUNTER — NURSE TRIAGE (OUTPATIENT)
Dept: ADMINISTRATIVE | Facility: CLINIC | Age: 86
End: 2021-09-21

## 2021-09-30 ENCOUNTER — TELEPHONE (OUTPATIENT)
Dept: CARDIOLOGY | Facility: CLINIC | Age: 86
End: 2021-09-30

## 2021-09-30 ENCOUNTER — LAB VISIT (OUTPATIENT)
Dept: LAB | Facility: HOSPITAL | Age: 86
End: 2021-09-30
Attending: INTERNAL MEDICINE
Payer: MEDICARE

## 2021-09-30 ENCOUNTER — TELEPHONE (OUTPATIENT)
Dept: ALLERGY | Facility: CLINIC | Age: 86
End: 2021-09-30
Payer: MEDICARE

## 2021-09-30 DIAGNOSIS — N18.31 STAGE 3A CHRONIC KIDNEY DISEASE: ICD-10-CM

## 2021-09-30 DIAGNOSIS — R26.81 GAIT INSTABILITY: ICD-10-CM

## 2021-09-30 DIAGNOSIS — I48.20 CHRONIC ATRIAL FIBRILLATION: Chronic | ICD-10-CM

## 2021-09-30 DIAGNOSIS — G45.9 TRANSIENT CEREBRAL ISCHEMIA, UNSPECIFIED TYPE: ICD-10-CM

## 2021-09-30 DIAGNOSIS — I50.32 CHRONIC DIASTOLIC HEART FAILURE: Chronic | ICD-10-CM

## 2021-09-30 DIAGNOSIS — Z86.73 HISTORY OF TIA (TRANSIENT ISCHEMIC ATTACK): Primary | ICD-10-CM

## 2021-09-30 DIAGNOSIS — I70.0 ATHEROSCLEROSIS OF AORTA: Chronic | ICD-10-CM

## 2021-09-30 DIAGNOSIS — I10 ESSENTIAL HYPERTENSION: Chronic | ICD-10-CM

## 2021-09-30 DIAGNOSIS — E78.00 PURE HYPERCHOLESTEROLEMIA: Chronic | ICD-10-CM

## 2021-09-30 DIAGNOSIS — E11.9 TYPE 2 DIABETES MELLITUS WITHOUT COMPLICATION, UNSPECIFIED WHETHER LONG TERM INSULIN USE: ICD-10-CM

## 2021-09-30 LAB
ANION GAP SERPL CALC-SCNC: 13 MMOL/L (ref 8–16)
BASOPHILS # BLD AUTO: 0.02 K/UL (ref 0–0.2)
BASOPHILS NFR BLD: 0.6 % (ref 0–1.9)
BUN SERPL-MCNC: 25 MG/DL (ref 8–23)
CALCIUM SERPL-MCNC: 9.2 MG/DL (ref 8.7–10.5)
CHLORIDE SERPL-SCNC: 99 MMOL/L (ref 95–110)
CO2 SERPL-SCNC: 21 MMOL/L (ref 23–29)
CREAT SERPL-MCNC: 1.1 MG/DL (ref 0.5–1.4)
DIFFERENTIAL METHOD: ABNORMAL
EOSINOPHIL # BLD AUTO: 0.1 K/UL (ref 0–0.5)
EOSINOPHIL NFR BLD: 1.9 % (ref 0–8)
ERYTHROCYTE [DISTWIDTH] IN BLOOD BY AUTOMATED COUNT: 15.1 % (ref 11.5–14.5)
EST. GFR  (AFRICAN AMERICAN): 51.4 ML/MIN/1.73 M^2
EST. GFR  (NON AFRICAN AMERICAN): 44.6 ML/MIN/1.73 M^2
ESTIMATED AVG GLUCOSE: 117 MG/DL (ref 68–131)
GLUCOSE SERPL-MCNC: 94 MG/DL (ref 70–110)
HBA1C MFR BLD: 5.7 % (ref 4–5.6)
HCT VFR BLD AUTO: 32.8 % (ref 37–48.5)
HGB BLD-MCNC: 10.7 G/DL (ref 12–16)
IMM GRANULOCYTES # BLD AUTO: 0.01 K/UL (ref 0–0.04)
IMM GRANULOCYTES NFR BLD AUTO: 0.3 % (ref 0–0.5)
LYMPHOCYTES # BLD AUTO: 0.8 K/UL (ref 1–4.8)
LYMPHOCYTES NFR BLD: 25.4 % (ref 18–48)
MCH RBC QN AUTO: 29.7 PG (ref 27–31)
MCHC RBC AUTO-ENTMCNC: 32.6 G/DL (ref 32–36)
MCV RBC AUTO: 91 FL (ref 82–98)
MONOCYTES # BLD AUTO: 0.3 K/UL (ref 0.3–1)
MONOCYTES NFR BLD: 9.4 % (ref 4–15)
NEUTROPHILS # BLD AUTO: 2 K/UL (ref 1.8–7.7)
NEUTROPHILS NFR BLD: 62.4 % (ref 38–73)
NRBC BLD-RTO: 0 /100 WBC
PLATELET # BLD AUTO: 192 K/UL (ref 150–450)
PMV BLD AUTO: 10.4 FL (ref 9.2–12.9)
POTASSIUM SERPL-SCNC: 4.2 MMOL/L (ref 3.5–5.1)
RBC # BLD AUTO: 3.6 M/UL (ref 4–5.4)
SODIUM SERPL-SCNC: 133 MMOL/L (ref 136–145)
WBC # BLD AUTO: 3.19 K/UL (ref 3.9–12.7)

## 2021-09-30 PROCEDURE — 85025 COMPLETE CBC W/AUTO DIFF WBC: CPT | Mod: HCNC | Performed by: INTERNAL MEDICINE

## 2021-09-30 PROCEDURE — 83036 HEMOGLOBIN GLYCOSYLATED A1C: CPT | Mod: HCNC | Performed by: INTERNAL MEDICINE

## 2021-09-30 PROCEDURE — 80048 BASIC METABOLIC PNL TOTAL CA: CPT | Mod: HCNC | Performed by: INTERNAL MEDICINE

## 2021-09-30 PROCEDURE — 36415 COLL VENOUS BLD VENIPUNCTURE: CPT | Mod: HCNC,PO | Performed by: INTERNAL MEDICINE

## 2021-10-02 PROCEDURE — 95165 PR PROFES SVC,IMMUNOTHER,SINGLE/MULT AGS: ICD-10-PCS | Mod: S$GLB,,, | Performed by: ALLERGY & IMMUNOLOGY

## 2021-10-02 PROCEDURE — 95165 ANTIGEN THERAPY SERVICES: CPT | Mod: S$GLB,,, | Performed by: ALLERGY & IMMUNOLOGY

## 2021-10-06 ENCOUNTER — TELEPHONE (OUTPATIENT)
Dept: ALLERGY | Facility: CLINIC | Age: 86
End: 2021-10-06

## 2021-10-15 NOTE — HPI
Jenniffer Jimenez is 88 y.o. female who presents for Atrial Fibrillation. She has a PMHx of chronic Afib w/ on warfarin, TIA in 1997 and 2005, HTN, HLD, HFpEF, CKD III, hearing loss requiring a hearing aid. Patient was seen in cardiology clinic 6/16/2020, having balance issues with falls leading to LE wounds, hence referred to IC clinic for Watchman evaluation. Wounds are now recovered on her L leg. No recent falls, but INR's have been labile and occasionally subtherapeutic in recent months. Creatinine/CrCl: 0.8 on 6/26/2020. She has prior history of colitis in 2016 and bowel obstruction attributed to narcotics. AFib chronic since 2016. CHADS2-VASc = 7 (11.2% annual risk of stroke, 15.7% risk of stroke/TIA/systemic embolism). HASBLED = 5.    Card Meds:  Warfarin 5 mg PO daily, furosemide 20 mg Daily    TTE 5/2017  Aorta: The aortic root is normal in size, measuring 3.1 cm at sinotubular junction and 3.0 cm at Sinuses of Valsalva. The proximal ascending aorta is normal in size, measuring 3.0 cm across.   Left Atrium: The left atrial volume index is severely enlarged, measuring 50.62 cc/m2.   Left Ventricle: Left ventricular systolic function appears normal. Visually estimated ejection fraction is 55-60%.   Right Atrium: The right atrium is upper limit of normal, measuring 6.1 cm in length and 4.2 cm in width in the apical view.   Right Ventricle: The right ventricle is normal in size measuring 4.0 cm at the base in the apical right ventricle-focused view.   Aortic Valve:  The aortic valve is mildly sclerotic. The aortic valve is trileaflet in structure.   Mitral Valve:  The mitral valve is mildly sclerotic. There is trivial to mild mitral regurgitation. There is mitral annular calcification.   Tricuspid Valve:  There is mild tricuspid regurgitation. Tricuspid valve is normal in structure with normal leaflet mobility.   Pulmonary Valve:  There is trivial pulmonic regurgitation. Pulmonary valve is normal in  structure with normal leaflet mobility.   IVC: IVC is enlarged but collapses > 50% with a sniff, suggesting intermediate right atrial pressure of 8 mmHg.   Intracavitary: There is no evidence of pericardial effusion, intracavitary mass, thrombi, or vegetation.     - - -

## 2021-10-18 ENCOUNTER — PATIENT MESSAGE (OUTPATIENT)
Dept: ADMINISTRATIVE | Facility: HOSPITAL | Age: 86
End: 2021-10-18
Payer: MEDICARE

## 2021-10-25 ENCOUNTER — PATIENT MESSAGE (OUTPATIENT)
Dept: ALLERGY | Facility: CLINIC | Age: 86
End: 2021-10-25
Payer: MEDICARE

## 2021-10-25 ENCOUNTER — TELEPHONE (OUTPATIENT)
Dept: ALLERGY | Facility: CLINIC | Age: 86
End: 2021-10-25
Payer: MEDICARE

## 2021-10-27 ENCOUNTER — HOSPITAL ENCOUNTER (OUTPATIENT)
Dept: CARDIOLOGY | Facility: HOSPITAL | Age: 86
Discharge: HOME | End: 2021-10-27
Attending: INTERNAL MEDICINE
Payer: MEDICARE

## 2021-10-27 DIAGNOSIS — G45.9 TRANSIENT CEREBRAL ISCHEMIA, UNSPECIFIED TYPE: ICD-10-CM

## 2021-10-27 DIAGNOSIS — N18.31 STAGE 3A CHRONIC KIDNEY DISEASE: ICD-10-CM

## 2021-10-27 DIAGNOSIS — I48.20 CHRONIC ATRIAL FIBRILLATION: ICD-10-CM

## 2021-10-27 DIAGNOSIS — I70.0 ATHEROSCLEROSIS OF AORTA: ICD-10-CM

## 2021-10-27 DIAGNOSIS — I10 ESSENTIAL HYPERTENSION: ICD-10-CM

## 2021-10-27 DIAGNOSIS — R26.81 GAIT INSTABILITY: ICD-10-CM

## 2021-10-27 DIAGNOSIS — E78.00 PURE HYPERCHOLESTEROLEMIA: ICD-10-CM

## 2021-10-27 DIAGNOSIS — I50.32 CHRONIC DIASTOLIC HEART FAILURE: ICD-10-CM

## 2021-10-27 DIAGNOSIS — Z86.73 HISTORY OF TIA (TRANSIENT ISCHEMIC ATTACK): ICD-10-CM

## 2021-10-27 LAB
LEFT ARM DIASTOLIC BLOOD PRESSURE: 80 MMHG
LEFT ARM SYSTOLIC BLOOD PRESSURE: 130 MMHG
LEFT CBA DIAS: 14 CM/S
LEFT CBA SYS: 71 CM/S
LEFT CCA DIST DIAS: 8 CM/S
LEFT CCA DIST SYS: 69 CM/S
LEFT CCA MID DIAS: 9 CM/S
LEFT CCA MID SYS: 87 CM/S
LEFT CCA PROX DIAS: 13 CM/S
LEFT CCA PROX SYS: 86 CM/S
LEFT ECA DIAS: 9 CM/S
LEFT ECA SYS: 130 CM/S
LEFT ICA DIST DIAS: 21 CM/S
LEFT ICA DIST SYS: 133 CM/S
LEFT ICA MID DIAS: 28 CM/S
LEFT ICA MID SYS: 113 CM/S
LEFT ICA PROX DIAS: 15 CM/S
LEFT ICA PROX SYS: 101 CM/S
LEFT VERTEBRAL DIAS: 7 CM/S
LEFT VERTEBRAL SYS: 54 CM/S
OHS CV CAROTID RIGHT ICA EDV HIGHEST: 27
OHS CV CAROTID ULTRASOUND LEFT ICA/CCA RATIO: 1.93
OHS CV CAROTID ULTRASOUND RIGHT ICA/CCA RATIO: 1.52
OHS CV PV CAROTID LEFT HIGHEST CCA: 87
OHS CV PV CAROTID LEFT HIGHEST ICA: 133
OHS CV PV CAROTID RIGHT HIGHEST CCA: 129
OHS CV PV CAROTID RIGHT HIGHEST ICA: 125
OHS CV US CAROTID LEFT HIGHEST EDV: 28
RIGHT ARM DIASTOLIC BLOOD PRESSURE: 80 MMHG
RIGHT ARM SYSTOLIC BLOOD PRESSURE: 130 MMHG
RIGHT CBA DIAS: 16 CM/S
RIGHT CBA SYS: 90 CM/S
RIGHT CCA DIST DIAS: 9 CM/S
RIGHT CCA DIST SYS: 82 CM/S
RIGHT CCA MID DIAS: 7 CM/S
RIGHT CCA MID SYS: 79 CM/S
RIGHT CCA PROX DIAS: 17 CM/S
RIGHT CCA PROX SYS: 129 CM/S
RIGHT ECA DIAS: 6 CM/S
RIGHT ECA SYS: 159 CM/S
RIGHT ICA DIST DIAS: 15 CM/S
RIGHT ICA DIST SYS: 75 CM/S
RIGHT ICA MID DIAS: 23 CM/S
RIGHT ICA MID SYS: 87 CM/S
RIGHT ICA PROX DIAS: 27 CM/S
RIGHT ICA PROX SYS: 125 CM/S
RIGHT VERTEBRAL DIAS: 10 CM/S
RIGHT VERTEBRAL SYS: 46 CM/S

## 2021-10-27 PROCEDURE — 93880 EXTRACRANIAL BILAT STUDY: CPT | Mod: 26,HCNC,, | Performed by: INTERNAL MEDICINE

## 2021-10-27 PROCEDURE — 93880 CV US DOPPLER CAROTID (CUPID ONLY): ICD-10-PCS | Mod: 26,HCNC,, | Performed by: INTERNAL MEDICINE

## 2021-10-27 PROCEDURE — 93880 EXTRACRANIAL BILAT STUDY: CPT | Mod: HCNC

## 2021-11-03 ENCOUNTER — PES CALL (OUTPATIENT)
Dept: ADMINISTRATIVE | Facility: CLINIC | Age: 86
End: 2021-11-03
Payer: MEDICARE

## 2021-11-03 ENCOUNTER — TELEPHONE (OUTPATIENT)
Dept: ALLERGY | Facility: CLINIC | Age: 86
End: 2021-11-03
Payer: MEDICARE

## 2021-11-03 ENCOUNTER — TELEPHONE (OUTPATIENT)
Dept: INTERNAL MEDICINE | Facility: CLINIC | Age: 86
End: 2021-11-03
Payer: MEDICARE

## 2021-11-03 DIAGNOSIS — Z12.31 VISIT FOR SCREENING MAMMOGRAM: Primary | ICD-10-CM

## 2021-11-04 ENCOUNTER — TELEPHONE (OUTPATIENT)
Dept: ALLERGY | Facility: CLINIC | Age: 86
End: 2021-11-04
Payer: MEDICARE

## 2021-11-09 ENCOUNTER — OFFICE VISIT (OUTPATIENT)
Dept: CARDIOLOGY | Facility: CLINIC | Age: 86
End: 2021-11-09
Payer: MEDICARE

## 2021-11-09 VITALS
HEART RATE: 65 BPM | OXYGEN SATURATION: 100 % | DIASTOLIC BLOOD PRESSURE: 71 MMHG | SYSTOLIC BLOOD PRESSURE: 154 MMHG | WEIGHT: 170.44 LBS | BODY MASS INDEX: 28.4 KG/M2 | HEIGHT: 65 IN

## 2021-11-09 DIAGNOSIS — I48.20 CHRONIC ATRIAL FIBRILLATION: ICD-10-CM

## 2021-11-09 DIAGNOSIS — Z95.818 PRESENCE OF WATCHMAN LEFT ATRIAL APPENDAGE CLOSURE DEVICE: Primary | ICD-10-CM

## 2021-11-09 DIAGNOSIS — I10 ESSENTIAL HYPERTENSION: ICD-10-CM

## 2021-11-09 DIAGNOSIS — R07.89 ATYPICAL CHEST PAIN: ICD-10-CM

## 2021-11-09 PROCEDURE — 1125F AMNT PAIN NOTED PAIN PRSNT: CPT | Mod: HCNC,CPTII,S$GLB, | Performed by: PHYSICIAN ASSISTANT

## 2021-11-09 PROCEDURE — 99214 PR OFFICE/OUTPT VISIT, EST, LEVL IV, 30-39 MIN: ICD-10-PCS | Mod: HCNC,S$GLB,, | Performed by: PHYSICIAN ASSISTANT

## 2021-11-09 PROCEDURE — 99999 PR PBB SHADOW E&M-EST. PATIENT-LVL III: ICD-10-PCS | Mod: PBBFAC,HCNC,, | Performed by: PHYSICIAN ASSISTANT

## 2021-11-09 PROCEDURE — 1125F PR PAIN SEVERITY QUANTIFIED, PAIN PRESENT: ICD-10-PCS | Mod: HCNC,CPTII,S$GLB, | Performed by: PHYSICIAN ASSISTANT

## 2021-11-09 PROCEDURE — 1159F PR MEDICATION LIST DOCUMENTED IN MEDICAL RECORD: ICD-10-PCS | Mod: HCNC,CPTII,S$GLB, | Performed by: PHYSICIAN ASSISTANT

## 2021-11-09 PROCEDURE — 99999 PR PBB SHADOW E&M-EST. PATIENT-LVL III: CPT | Mod: PBBFAC,HCNC,, | Performed by: PHYSICIAN ASSISTANT

## 2021-11-09 PROCEDURE — 99214 OFFICE O/P EST MOD 30 MIN: CPT | Mod: HCNC,S$GLB,, | Performed by: PHYSICIAN ASSISTANT

## 2021-11-09 PROCEDURE — 1159F MED LIST DOCD IN RCRD: CPT | Mod: HCNC,CPTII,S$GLB, | Performed by: PHYSICIAN ASSISTANT

## 2021-11-10 ENCOUNTER — PATIENT MESSAGE (OUTPATIENT)
Dept: INTERNAL MEDICINE | Facility: CLINIC | Age: 86
End: 2021-11-10
Payer: MEDICARE

## 2021-11-10 ENCOUNTER — TELEPHONE (OUTPATIENT)
Dept: INTERNAL MEDICINE | Facility: CLINIC | Age: 86
End: 2021-11-10
Payer: MEDICARE

## 2021-11-10 ENCOUNTER — TELEPHONE (OUTPATIENT)
Dept: ALLERGY | Facility: CLINIC | Age: 86
End: 2021-11-10
Payer: MEDICARE

## 2021-11-11 ENCOUNTER — OFFICE VISIT (OUTPATIENT)
Dept: INTERNAL MEDICINE | Facility: CLINIC | Age: 86
End: 2021-11-11
Payer: MEDICARE

## 2021-11-11 ENCOUNTER — LAB VISIT (OUTPATIENT)
Dept: LAB | Facility: HOSPITAL | Age: 86
End: 2021-11-11
Attending: INTERNAL MEDICINE
Payer: MEDICARE

## 2021-11-11 VITALS
HEART RATE: 60 BPM | WEIGHT: 170.88 LBS | BODY MASS INDEX: 28.47 KG/M2 | OXYGEN SATURATION: 100 % | DIASTOLIC BLOOD PRESSURE: 72 MMHG | HEIGHT: 65 IN | SYSTOLIC BLOOD PRESSURE: 110 MMHG | RESPIRATION RATE: 16 BRPM | TEMPERATURE: 98 F

## 2021-11-11 DIAGNOSIS — R20.2 NUMBNESS AND TINGLING: ICD-10-CM

## 2021-11-11 DIAGNOSIS — N18.30 STAGE 3 CHRONIC KIDNEY DISEASE, UNSPECIFIED WHETHER STAGE 3A OR 3B CKD: ICD-10-CM

## 2021-11-11 DIAGNOSIS — R25.2 LEG CRAMPS: ICD-10-CM

## 2021-11-11 DIAGNOSIS — R20.0 NUMBNESS AND TINGLING: ICD-10-CM

## 2021-11-11 DIAGNOSIS — I70.0 ATHEROSCLEROSIS OF AORTA: ICD-10-CM

## 2021-11-11 DIAGNOSIS — I48.20 CHRONIC ATRIAL FIBRILLATION: ICD-10-CM

## 2021-11-11 DIAGNOSIS — I50.32 CHRONIC DIASTOLIC HEART FAILURE: ICD-10-CM

## 2021-11-11 DIAGNOSIS — I10 HYPERTENSION, UNSPECIFIED TYPE: Primary | ICD-10-CM

## 2021-11-11 DIAGNOSIS — E13.42 DIABETIC POLYNEUROPATHY ASSOCIATED WITH OTHER SPECIFIED DIABETES MELLITUS: ICD-10-CM

## 2021-11-11 DIAGNOSIS — I10 HYPERTENSION, UNSPECIFIED TYPE: ICD-10-CM

## 2021-11-11 LAB
25(OH)D3+25(OH)D2 SERPL-MCNC: 43 NG/ML (ref 30–96)
ALBUMIN SERPL BCP-MCNC: 4 G/DL (ref 3.5–5.2)
ALP SERPL-CCNC: 116 U/L (ref 55–135)
ALT SERPL W/O P-5'-P-CCNC: 17 U/L (ref 10–44)
ANION GAP SERPL CALC-SCNC: 14 MMOL/L (ref 8–16)
AST SERPL-CCNC: 22 U/L (ref 10–40)
BILIRUB SERPL-MCNC: 0.6 MG/DL (ref 0.1–1)
BUN SERPL-MCNC: 19 MG/DL (ref 8–23)
CALCIUM SERPL-MCNC: 9.7 MG/DL (ref 8.7–10.5)
CHLORIDE SERPL-SCNC: 102 MMOL/L (ref 95–110)
CO2 SERPL-SCNC: 24 MMOL/L (ref 23–29)
CREAT SERPL-MCNC: 1.4 MG/DL (ref 0.5–1.4)
EST. GFR  (AFRICAN AMERICAN): 38.4 ML/MIN/1.73 M^2
EST. GFR  (NON AFRICAN AMERICAN): 33.3 ML/MIN/1.73 M^2
GLUCOSE SERPL-MCNC: 102 MG/DL (ref 70–110)
MAGNESIUM SERPL-MCNC: 2.1 MG/DL (ref 1.6–2.6)
POTASSIUM SERPL-SCNC: 4.5 MMOL/L (ref 3.5–5.1)
PROT SERPL-MCNC: 7.2 G/DL (ref 6–8.4)
SODIUM SERPL-SCNC: 140 MMOL/L (ref 136–145)
VIT B12 SERPL-MCNC: 398 PG/ML (ref 210–950)

## 2021-11-11 PROCEDURE — 3288F PR FALLS RISK ASSESSMENT DOCUMENTED: ICD-10-PCS | Mod: HCNC,CPTII,S$GLB, | Performed by: INTERNAL MEDICINE

## 2021-11-11 PROCEDURE — 3288F FALL RISK ASSESSMENT DOCD: CPT | Mod: HCNC,CPTII,S$GLB, | Performed by: INTERNAL MEDICINE

## 2021-11-11 PROCEDURE — 80053 COMPREHEN METABOLIC PANEL: CPT | Mod: HCNC | Performed by: INTERNAL MEDICINE

## 2021-11-11 PROCEDURE — 83735 ASSAY OF MAGNESIUM: CPT | Mod: HCNC | Performed by: INTERNAL MEDICINE

## 2021-11-11 PROCEDURE — 1101F PR PT FALLS ASSESS DOC 0-1 FALLS W/OUT INJ PAST YR: ICD-10-PCS | Mod: HCNC,CPTII,S$GLB, | Performed by: INTERNAL MEDICINE

## 2021-11-11 PROCEDURE — 1126F PR PAIN SEVERITY QUANTIFIED, NO PAIN PRESENT: ICD-10-PCS | Mod: HCNC,CPTII,S$GLB, | Performed by: INTERNAL MEDICINE

## 2021-11-11 PROCEDURE — 1126F AMNT PAIN NOTED NONE PRSNT: CPT | Mod: HCNC,CPTII,S$GLB, | Performed by: INTERNAL MEDICINE

## 2021-11-11 PROCEDURE — 1159F MED LIST DOCD IN RCRD: CPT | Mod: HCNC,CPTII,S$GLB, | Performed by: INTERNAL MEDICINE

## 2021-11-11 PROCEDURE — 1159F PR MEDICATION LIST DOCUMENTED IN MEDICAL RECORD: ICD-10-PCS | Mod: HCNC,CPTII,S$GLB, | Performed by: INTERNAL MEDICINE

## 2021-11-11 PROCEDURE — 99999 PR PBB SHADOW E&M-EST. PATIENT-LVL IV: CPT | Mod: PBBFAC,HCNC,, | Performed by: INTERNAL MEDICINE

## 2021-11-11 PROCEDURE — 99214 OFFICE O/P EST MOD 30 MIN: CPT | Mod: HCNC,S$GLB,, | Performed by: INTERNAL MEDICINE

## 2021-11-11 PROCEDURE — 82607 VITAMIN B-12: CPT | Mod: HCNC | Performed by: INTERNAL MEDICINE

## 2021-11-11 PROCEDURE — 36415 COLL VENOUS BLD VENIPUNCTURE: CPT | Mod: HCNC,PO | Performed by: INTERNAL MEDICINE

## 2021-11-11 PROCEDURE — 99214 PR OFFICE/OUTPT VISIT, EST, LEVL IV, 30-39 MIN: ICD-10-PCS | Mod: HCNC,S$GLB,, | Performed by: INTERNAL MEDICINE

## 2021-11-11 PROCEDURE — 82306 VITAMIN D 25 HYDROXY: CPT | Mod: HCNC | Performed by: INTERNAL MEDICINE

## 2021-11-11 PROCEDURE — 99999 PR PBB SHADOW E&M-EST. PATIENT-LVL IV: ICD-10-PCS | Mod: PBBFAC,HCNC,, | Performed by: INTERNAL MEDICINE

## 2021-11-11 PROCEDURE — 1101F PT FALLS ASSESS-DOCD LE1/YR: CPT | Mod: HCNC,CPTII,S$GLB, | Performed by: INTERNAL MEDICINE

## 2021-11-18 ENCOUNTER — TELEPHONE (OUTPATIENT)
Dept: INTERNAL MEDICINE | Facility: CLINIC | Age: 86
End: 2021-11-18
Payer: MEDICARE

## 2021-11-19 ENCOUNTER — CLINICAL SUPPORT (OUTPATIENT)
Dept: INTERNAL MEDICINE | Facility: CLINIC | Age: 86
End: 2021-11-19
Payer: MEDICARE

## 2021-11-19 DIAGNOSIS — J31.0 CHRONIC RHINITIS: ICD-10-CM

## 2021-11-19 PROCEDURE — 99499 NO LOS: ICD-10-PCS | Mod: HCNC,S$GLB,, | Performed by: ALLERGY & IMMUNOLOGY

## 2021-11-19 PROCEDURE — 95115 IMMUNOTHERAPY ONE INJECTION: CPT | Mod: HCNC,S$GLB,, | Performed by: FAMILY MEDICINE

## 2021-11-19 PROCEDURE — 95115 PR IMMUNOTHERAPY, ONE INJECTION: ICD-10-PCS | Mod: HCNC,S$GLB,, | Performed by: FAMILY MEDICINE

## 2021-11-19 PROCEDURE — 99499 UNLISTED E&M SERVICE: CPT | Mod: HCNC,S$GLB,, | Performed by: ALLERGY & IMMUNOLOGY

## 2021-11-29 ENCOUNTER — CLINICAL SUPPORT (OUTPATIENT)
Dept: INTERNAL MEDICINE | Facility: CLINIC | Age: 86
End: 2021-11-29
Payer: MEDICARE

## 2021-11-29 DIAGNOSIS — J30.1 ALLERGIC RHINITIS DUE TO POLLEN, UNSPECIFIED SEASONALITY: Primary | ICD-10-CM

## 2021-11-29 PROCEDURE — 95115 IMMUNOTHERAPY ONE INJECTION: CPT | Mod: HCNC,S$GLB,, | Performed by: FAMILY MEDICINE

## 2021-11-29 PROCEDURE — 99499 UNLISTED E&M SERVICE: CPT | Mod: HCNC,S$GLB,, | Performed by: ALLERGY & IMMUNOLOGY

## 2021-11-29 PROCEDURE — 99499 NO LOS: ICD-10-PCS | Mod: HCNC,S$GLB,, | Performed by: ALLERGY & IMMUNOLOGY

## 2021-11-29 PROCEDURE — 95115 PR IMMUNOTHERAPY, ONE INJECTION: ICD-10-PCS | Mod: HCNC,S$GLB,, | Performed by: FAMILY MEDICINE

## 2021-12-03 ENCOUNTER — PATIENT MESSAGE (OUTPATIENT)
Dept: PRIMARY CARE CLINIC | Facility: CLINIC | Age: 86
End: 2021-12-03
Payer: MEDICARE

## 2021-12-03 ENCOUNTER — CLINICAL SUPPORT (OUTPATIENT)
Dept: INTERNAL MEDICINE | Facility: CLINIC | Age: 86
End: 2021-12-03
Payer: MEDICARE

## 2021-12-03 DIAGNOSIS — J30.9 CHRONIC ALLERGIC RHINITIS: ICD-10-CM

## 2021-12-03 PROCEDURE — 99499 NO LOS: ICD-10-PCS | Mod: HCNC,S$GLB,, | Performed by: ALLERGY & IMMUNOLOGY

## 2021-12-03 PROCEDURE — 95115 PR IMMUNOTHERAPY, ONE INJECTION: ICD-10-PCS | Mod: HCNC,S$GLB,, | Performed by: FAMILY MEDICINE

## 2021-12-03 PROCEDURE — 99499 UNLISTED E&M SERVICE: CPT | Mod: HCNC,S$GLB,, | Performed by: ALLERGY & IMMUNOLOGY

## 2021-12-03 PROCEDURE — 95115 IMMUNOTHERAPY ONE INJECTION: CPT | Mod: HCNC,S$GLB,, | Performed by: FAMILY MEDICINE

## 2021-12-06 ENCOUNTER — CLINICAL SUPPORT (OUTPATIENT)
Dept: INTERNAL MEDICINE | Facility: CLINIC | Age: 86
End: 2021-12-06
Payer: MEDICARE

## 2021-12-06 DIAGNOSIS — J30.1 ALLERGIC RHINITIS DUE TO POLLEN, UNSPECIFIED SEASONALITY: Primary | ICD-10-CM

## 2021-12-06 PROCEDURE — 99499 UNLISTED E&M SERVICE: CPT | Mod: HCNC,S$GLB,, | Performed by: ALLERGY & IMMUNOLOGY

## 2021-12-06 PROCEDURE — 95115 IMMUNOTHERAPY ONE INJECTION: CPT | Mod: HCNC,S$GLB,, | Performed by: FAMILY MEDICINE

## 2021-12-06 PROCEDURE — 99499 NO LOS: ICD-10-PCS | Mod: HCNC,S$GLB,, | Performed by: ALLERGY & IMMUNOLOGY

## 2021-12-06 PROCEDURE — 95115 PR IMMUNOTHERAPY, ONE INJECTION: ICD-10-PCS | Mod: HCNC,S$GLB,, | Performed by: FAMILY MEDICINE

## 2021-12-10 ENCOUNTER — CLINICAL SUPPORT (OUTPATIENT)
Dept: INTERNAL MEDICINE | Facility: CLINIC | Age: 86
End: 2021-12-10
Payer: MEDICARE

## 2021-12-10 DIAGNOSIS — J30.1 ALLERGIC RHINITIS DUE TO POLLEN, UNSPECIFIED SEASONALITY: Primary | ICD-10-CM

## 2021-12-10 DIAGNOSIS — Z91.048 ALLERGY TO MOLD: ICD-10-CM

## 2021-12-10 DIAGNOSIS — Z91.09 ALLERGY TO MITES: ICD-10-CM

## 2021-12-10 PROCEDURE — 95115 IMMUNOTHERAPY ONE INJECTION: CPT | Mod: HCNC,S$GLB,, | Performed by: FAMILY MEDICINE

## 2021-12-10 PROCEDURE — 99499 UNLISTED E&M SERVICE: CPT | Mod: HCNC,S$GLB,, | Performed by: ALLERGY & IMMUNOLOGY

## 2021-12-10 PROCEDURE — 99499 NO LOS: ICD-10-PCS | Mod: HCNC,S$GLB,, | Performed by: ALLERGY & IMMUNOLOGY

## 2021-12-10 PROCEDURE — 95115 PR IMMUNOTHERAPY, ONE INJECTION: ICD-10-PCS | Mod: HCNC,S$GLB,, | Performed by: FAMILY MEDICINE

## 2021-12-13 ENCOUNTER — TELEPHONE (OUTPATIENT)
Dept: INTERNAL MEDICINE | Facility: CLINIC | Age: 86
End: 2021-12-13
Payer: MEDICARE

## 2021-12-13 ENCOUNTER — CLINICAL SUPPORT (OUTPATIENT)
Dept: INTERNAL MEDICINE | Facility: CLINIC | Age: 86
End: 2021-12-13
Payer: MEDICARE

## 2021-12-13 DIAGNOSIS — Z91.048 ALLERGY TO MOLD: ICD-10-CM

## 2021-12-13 DIAGNOSIS — J30.1 ALLERGIC RHINITIS DUE TO POLLEN, UNSPECIFIED SEASONALITY: Primary | ICD-10-CM

## 2021-12-13 DIAGNOSIS — Z91.09 ALLERGY TO MITES: ICD-10-CM

## 2021-12-13 PROCEDURE — 95115 IMMUNOTHERAPY ONE INJECTION: CPT | Mod: HCNC,S$GLB,, | Performed by: FAMILY MEDICINE

## 2021-12-13 PROCEDURE — 95115 PR IMMUNOTHERAPY, ONE INJECTION: ICD-10-PCS | Mod: HCNC,S$GLB,, | Performed by: FAMILY MEDICINE

## 2021-12-13 PROCEDURE — 99499 UNLISTED E&M SERVICE: CPT | Mod: HCNC,S$GLB,, | Performed by: ALLERGY & IMMUNOLOGY

## 2021-12-13 PROCEDURE — 99499 NO LOS: ICD-10-PCS | Mod: HCNC,S$GLB,, | Performed by: ALLERGY & IMMUNOLOGY

## 2021-12-18 ENCOUNTER — NURSE TRIAGE (OUTPATIENT)
Dept: ADMINISTRATIVE | Facility: CLINIC | Age: 86
End: 2021-12-18
Payer: MEDICARE

## 2021-12-19 ENCOUNTER — OFFICE VISIT (OUTPATIENT)
Dept: URGENT CARE | Facility: CLINIC | Age: 86
End: 2021-12-19
Payer: MEDICARE

## 2021-12-19 VITALS
RESPIRATION RATE: 17 BRPM | HEART RATE: 69 BPM | TEMPERATURE: 98 F | BODY MASS INDEX: 28.32 KG/M2 | SYSTOLIC BLOOD PRESSURE: 165 MMHG | DIASTOLIC BLOOD PRESSURE: 77 MMHG | OXYGEN SATURATION: 100 % | WEIGHT: 170 LBS | HEIGHT: 65 IN

## 2021-12-19 DIAGNOSIS — M25.512 ACUTE PAIN OF LEFT SHOULDER: Primary | ICD-10-CM

## 2021-12-19 PROCEDURE — 73030 XR SHOULDER COMPLETE 2 OR MORE VIEWS LEFT: ICD-10-PCS | Mod: FY,LT,S$GLB, | Performed by: RADIOLOGY

## 2021-12-19 PROCEDURE — 73030 X-RAY EXAM OF SHOULDER: CPT | Mod: FY,LT,S$GLB, | Performed by: RADIOLOGY

## 2021-12-19 PROCEDURE — 99213 PR OFFICE/OUTPT VISIT, EST, LEVL III, 20-29 MIN: ICD-10-PCS | Mod: S$GLB,,,

## 2021-12-19 PROCEDURE — 99213 OFFICE O/P EST LOW 20 MIN: CPT | Mod: S$GLB,,,

## 2021-12-19 RX ORDER — DICLOFENAC SODIUM 10 MG/G
2 GEL TOPICAL 4 TIMES DAILY
Qty: 100 G | Refills: 1 | Status: ON HOLD | OUTPATIENT
Start: 2021-12-19 | End: 2022-10-10 | Stop reason: HOSPADM

## 2021-12-19 RX ORDER — DICLOFENAC SODIUM 10 MG/G
2 GEL TOPICAL 4 TIMES DAILY
Qty: 100 G | Refills: 1 | Status: CANCELLED | OUTPATIENT
Start: 2021-12-19 | End: 2021-12-29

## 2021-12-20 ENCOUNTER — TELEPHONE (OUTPATIENT)
Dept: CARDIOLOGY | Facility: CLINIC | Age: 86
End: 2021-12-20
Payer: MEDICARE

## 2021-12-21 ENCOUNTER — OFFICE VISIT (OUTPATIENT)
Dept: ORTHOPEDICS | Facility: CLINIC | Age: 86
End: 2021-12-21
Payer: MEDICARE

## 2021-12-21 VITALS
HEIGHT: 65 IN | HEART RATE: 77 BPM | SYSTOLIC BLOOD PRESSURE: 134 MMHG | BODY MASS INDEX: 28.32 KG/M2 | DIASTOLIC BLOOD PRESSURE: 68 MMHG | WEIGHT: 170 LBS

## 2021-12-21 DIAGNOSIS — M19.112 POST-TRAUMATIC OSTEOARTHRITIS OF LEFT SHOULDER: Primary | ICD-10-CM

## 2021-12-21 PROCEDURE — 20610 DRAIN/INJ JOINT/BURSA W/O US: CPT | Mod: HCNC,LT,S$GLB, | Performed by: PHYSICIAN ASSISTANT

## 2021-12-21 PROCEDURE — 99213 OFFICE O/P EST LOW 20 MIN: CPT | Mod: 25,HCNC,S$GLB, | Performed by: PHYSICIAN ASSISTANT

## 2021-12-21 PROCEDURE — 99213 PR OFFICE/OUTPT VISIT, EST, LEVL III, 20-29 MIN: ICD-10-PCS | Mod: 25,HCNC,S$GLB, | Performed by: PHYSICIAN ASSISTANT

## 2021-12-21 PROCEDURE — 99999 PR PBB SHADOW E&M-EST. PATIENT-LVL IV: ICD-10-PCS | Mod: PBBFAC,HCNC,, | Performed by: PHYSICIAN ASSISTANT

## 2021-12-21 PROCEDURE — 20610 PR DRAIN/INJECT LARGE JOINT/BURSA: ICD-10-PCS | Mod: HCNC,LT,S$GLB, | Performed by: PHYSICIAN ASSISTANT

## 2021-12-21 PROCEDURE — 99999 PR PBB SHADOW E&M-EST. PATIENT-LVL IV: CPT | Mod: PBBFAC,HCNC,, | Performed by: PHYSICIAN ASSISTANT

## 2021-12-21 RX ORDER — TRIAMCINOLONE ACETONIDE 40 MG/ML
40 INJECTION, SUSPENSION INTRA-ARTICULAR; INTRAMUSCULAR
Status: COMPLETED | OUTPATIENT
Start: 2021-12-21 | End: 2021-12-21

## 2021-12-21 RX ADMIN — TRIAMCINOLONE ACETONIDE 40 MG: 40 INJECTION, SUSPENSION INTRA-ARTICULAR; INTRAMUSCULAR at 04:12

## 2021-12-23 ENCOUNTER — HOSPITAL ENCOUNTER (EMERGENCY)
Facility: HOSPITAL | Age: 86
Discharge: HOME | End: 2021-12-23
Attending: EMERGENCY MEDICINE
Payer: MEDICARE

## 2021-12-23 ENCOUNTER — TELEPHONE (OUTPATIENT)
Dept: ORTHOPEDICS | Facility: CLINIC | Age: 86
End: 2021-12-23
Payer: MEDICARE

## 2021-12-23 VITALS
DIASTOLIC BLOOD PRESSURE: 64 MMHG | WEIGHT: 169.75 LBS | BODY MASS INDEX: 28.25 KG/M2 | HEART RATE: 74 BPM | RESPIRATION RATE: 16 BRPM | SYSTOLIC BLOOD PRESSURE: 145 MMHG | TEMPERATURE: 99 F | OXYGEN SATURATION: 98 %

## 2021-12-23 DIAGNOSIS — M25.512 LEFT SHOULDER PAIN: Primary | ICD-10-CM

## 2021-12-23 DIAGNOSIS — M75.00 ADHESIVE CAPSULITIS OF SHOULDER, UNSPECIFIED LATERALITY: ICD-10-CM

## 2021-12-23 LAB
ALBUMIN SERPL BCP-MCNC: 4.2 G/DL (ref 3.5–5.2)
ALP SERPL-CCNC: 110 U/L (ref 55–135)
ALT SERPL W/O P-5'-P-CCNC: 13 U/L (ref 10–44)
ANION GAP SERPL CALC-SCNC: 12 MMOL/L (ref 8–16)
AST SERPL-CCNC: 17 U/L (ref 10–40)
BASOPHILS # BLD AUTO: 0.03 K/UL (ref 0–0.2)
BASOPHILS NFR BLD: 0.5 % (ref 0–1.9)
BILIRUB SERPL-MCNC: 0.6 MG/DL (ref 0.1–1)
BUN SERPL-MCNC: 32 MG/DL (ref 8–23)
CALCIUM SERPL-MCNC: 9.5 MG/DL (ref 8.7–10.5)
CHLORIDE SERPL-SCNC: 104 MMOL/L (ref 95–110)
CO2 SERPL-SCNC: 23 MMOL/L (ref 23–29)
CREAT SERPL-MCNC: 1.2 MG/DL (ref 0.5–1.4)
CRP SERPL-MCNC: 0.7 MG/L (ref 0–8.2)
DIFFERENTIAL METHOD: ABNORMAL
EOSINOPHIL # BLD AUTO: 0.1 K/UL (ref 0–0.5)
EOSINOPHIL NFR BLD: 0.8 % (ref 0–8)
ERYTHROCYTE [DISTWIDTH] IN BLOOD BY AUTOMATED COUNT: 15 % (ref 11.5–14.5)
ERYTHROCYTE [SEDIMENTATION RATE] IN BLOOD BY WESTERGREN METHOD: 27 MM/HR (ref 0–36)
EST. GFR  (AFRICAN AMERICAN): 46.3 ML/MIN/1.73 M^2
EST. GFR  (NON AFRICAN AMERICAN): 40.2 ML/MIN/1.73 M^2
GLUCOSE SERPL-MCNC: 94 MG/DL (ref 70–110)
HCT VFR BLD AUTO: 34.3 % (ref 37–48.5)
HGB BLD-MCNC: 11.2 G/DL (ref 12–16)
IMM GRANULOCYTES # BLD AUTO: 0.02 K/UL (ref 0–0.04)
IMM GRANULOCYTES NFR BLD AUTO: 0.3 % (ref 0–0.5)
LYMPHOCYTES # BLD AUTO: 0.9 K/UL (ref 1–4.8)
LYMPHOCYTES NFR BLD: 13.8 % (ref 18–48)
MCH RBC QN AUTO: 29.9 PG (ref 27–31)
MCHC RBC AUTO-ENTMCNC: 32.7 G/DL (ref 32–36)
MCV RBC AUTO: 92 FL (ref 82–98)
MONOCYTES # BLD AUTO: 0.4 K/UL (ref 0.3–1)
MONOCYTES NFR BLD: 6.8 % (ref 4–15)
NEUTROPHILS # BLD AUTO: 4.8 K/UL (ref 1.8–7.7)
NEUTROPHILS NFR BLD: 77.8 % (ref 38–73)
NRBC BLD-RTO: 0 /100 WBC
PLATELET # BLD AUTO: 212 K/UL (ref 150–450)
PMV BLD AUTO: 9.8 FL (ref 9.2–12.9)
POTASSIUM SERPL-SCNC: 4.5 MMOL/L (ref 3.5–5.1)
PROT SERPL-MCNC: 7.4 G/DL (ref 6–8.4)
RBC # BLD AUTO: 3.75 M/UL (ref 4–5.4)
SODIUM SERPL-SCNC: 139 MMOL/L (ref 136–145)
URATE SERPL-MCNC: 7.9 MG/DL (ref 2.4–5.7)
WBC # BLD AUTO: 6.14 K/UL (ref 3.9–12.7)

## 2021-12-23 PROCEDURE — 93005 ELECTROCARDIOGRAM TRACING: CPT | Mod: HCNC

## 2021-12-23 PROCEDURE — 93010 EKG 12-LEAD: ICD-10-PCS | Mod: HCNC,,, | Performed by: INTERNAL MEDICINE

## 2021-12-23 PROCEDURE — 99284 EMERGENCY DEPT VISIT MOD MDM: CPT | Mod: HCNC,,, | Performed by: NURSE PRACTITIONER

## 2021-12-23 PROCEDURE — 99285 EMERGENCY DEPT VISIT HI MDM: CPT | Mod: 25,HCNC

## 2021-12-23 PROCEDURE — 93010 ELECTROCARDIOGRAM REPORT: CPT | Mod: HCNC,,, | Performed by: INTERNAL MEDICINE

## 2021-12-23 PROCEDURE — 63600175 PHARM REV CODE 636 W HCPCS: Mod: HCNC | Performed by: NURSE PRACTITIONER

## 2021-12-23 PROCEDURE — 84550 ASSAY OF BLOOD/URIC ACID: CPT | Mod: HCNC | Performed by: NURSE PRACTITIONER

## 2021-12-23 PROCEDURE — 80053 COMPREHEN METABOLIC PANEL: CPT | Mod: HCNC | Performed by: NURSE PRACTITIONER

## 2021-12-23 PROCEDURE — 85652 RBC SED RATE AUTOMATED: CPT | Mod: HCNC | Performed by: NURSE PRACTITIONER

## 2021-12-23 PROCEDURE — 85025 COMPLETE CBC W/AUTO DIFF WBC: CPT | Mod: HCNC | Performed by: NURSE PRACTITIONER

## 2021-12-23 PROCEDURE — 99284 PR EMERGENCY DEPT VISIT,LEVEL IV: ICD-10-PCS | Mod: HCNC,,, | Performed by: NURSE PRACTITIONER

## 2021-12-23 PROCEDURE — 96372 THER/PROPH/DIAG INJ SC/IM: CPT | Mod: HCNC

## 2021-12-23 PROCEDURE — 86140 C-REACTIVE PROTEIN: CPT | Mod: HCNC | Performed by: NURSE PRACTITIONER

## 2021-12-23 RX ORDER — DEXAMETHASONE SODIUM PHOSPHATE 4 MG/ML
6 INJECTION, SOLUTION INTRA-ARTICULAR; INTRALESIONAL; INTRAMUSCULAR; INTRAVENOUS; SOFT TISSUE
Status: COMPLETED | OUTPATIENT
Start: 2021-12-23 | End: 2021-12-23

## 2021-12-23 RX ORDER — MELOXICAM 7.5 MG/1
7.5 TABLET ORAL DAILY PRN
Qty: 7 TABLET | Refills: 0 | Status: SHIPPED | OUTPATIENT
Start: 2021-12-23 | End: 2021-12-23 | Stop reason: SDUPTHER

## 2021-12-23 RX ORDER — MELOXICAM 7.5 MG/1
7.5 TABLET ORAL DAILY PRN
Qty: 7 TABLET | Refills: 0 | Status: SHIPPED | OUTPATIENT
Start: 2021-12-23 | End: 2022-01-14

## 2021-12-23 RX ADMIN — DEXAMETHASONE SODIUM PHOSPHATE 6 MG: 4 INJECTION INTRA-ARTICULAR; INTRALESIONAL; INTRAMUSCULAR; INTRAVENOUS; SOFT TISSUE at 07:12

## 2021-12-27 ENCOUNTER — TELEPHONE (OUTPATIENT)
Dept: ALLERGY | Facility: CLINIC | Age: 86
End: 2021-12-27
Payer: MEDICARE

## 2022-01-05 ENCOUNTER — CLINICAL SUPPORT (OUTPATIENT)
Dept: INTERNAL MEDICINE | Facility: CLINIC | Age: 87
End: 2022-01-05
Payer: MEDICARE

## 2022-01-05 DIAGNOSIS — J30.1 ALLERGIC RHINITIS DUE TO POLLEN, UNSPECIFIED SEASONALITY: Primary | ICD-10-CM

## 2022-01-05 DIAGNOSIS — Z91.048 ALLERGY TO MOLD: ICD-10-CM

## 2022-01-05 DIAGNOSIS — Z91.09 ALLERGY TO MITES: ICD-10-CM

## 2022-01-05 PROCEDURE — 95115 PR IMMUNOTHERAPY, ONE INJECTION: ICD-10-PCS | Mod: HCNC,S$GLB,, | Performed by: FAMILY MEDICINE

## 2022-01-05 PROCEDURE — 95115 IMMUNOTHERAPY ONE INJECTION: CPT | Mod: HCNC,S$GLB,, | Performed by: FAMILY MEDICINE

## 2022-01-05 PROCEDURE — 99499 NO LOS: ICD-10-PCS | Mod: HCNC,S$GLB,, | Performed by: ALLERGY & IMMUNOLOGY

## 2022-01-05 PROCEDURE — 99499 UNLISTED E&M SERVICE: CPT | Mod: HCNC,S$GLB,, | Performed by: ALLERGY & IMMUNOLOGY

## 2022-01-05 NOTE — PROGRESS NOTES
SQ-RT UPPER ARM  allergy injection red vial # 1/1 DM/M/ 0.3 ml given,tolerated well. Pt. Waited 30 minutes, no local reaction noted

## 2022-01-06 ENCOUNTER — OFFICE VISIT (OUTPATIENT)
Dept: INTERNAL MEDICINE | Facility: CLINIC | Age: 87
End: 2022-01-06
Payer: MEDICARE

## 2022-01-06 VITALS
DIASTOLIC BLOOD PRESSURE: 70 MMHG | SYSTOLIC BLOOD PRESSURE: 110 MMHG | BODY MASS INDEX: 25.94 KG/M2 | HEIGHT: 66 IN | TEMPERATURE: 98 F | OXYGEN SATURATION: 100 % | HEART RATE: 77 BPM | WEIGHT: 161.38 LBS

## 2022-01-06 DIAGNOSIS — Z86.73 HISTORY OF TIA (TRANSIENT ISCHEMIC ATTACK): ICD-10-CM

## 2022-01-06 DIAGNOSIS — H52.7 REFRACTIVE ERROR: ICD-10-CM

## 2022-01-06 DIAGNOSIS — E66.3 OVERWEIGHT (BMI 25.0-29.9): ICD-10-CM

## 2022-01-06 DIAGNOSIS — I10 PRIMARY HYPERTENSION: Chronic | ICD-10-CM

## 2022-01-06 DIAGNOSIS — M47.817 FACET ARTHROPATHY, LUMBOSACRAL: ICD-10-CM

## 2022-01-06 DIAGNOSIS — H52.01 HYPERMETROPIA OF RIGHT EYE: ICD-10-CM

## 2022-01-06 DIAGNOSIS — H04.129 DRY EYE SYNDROME, UNSPECIFIED LATERALITY: ICD-10-CM

## 2022-01-06 DIAGNOSIS — H34.8122 STABLE CENTRAL RETINAL VEIN OCCLUSION OF LEFT EYE: ICD-10-CM

## 2022-01-06 DIAGNOSIS — H90.3 SENSORINEURAL HEARING LOSS (SNHL) OF BOTH EARS: ICD-10-CM

## 2022-01-06 DIAGNOSIS — H34.8392 BRANCH RETINAL VEIN OCCLUSION, UNSPECIFIED COMPLICATION STATUS, UNSPECIFIED LATERALITY: ICD-10-CM

## 2022-01-06 DIAGNOSIS — G44.86 CERVICOGENIC HEADACHE: ICD-10-CM

## 2022-01-06 DIAGNOSIS — R26.81 GAIT INSTABILITY: ICD-10-CM

## 2022-01-06 DIAGNOSIS — I50.32 CHRONIC DIASTOLIC HEART FAILURE: ICD-10-CM

## 2022-01-06 DIAGNOSIS — J31.0 CHRONIC RHINITIS: ICD-10-CM

## 2022-01-06 DIAGNOSIS — I87.2 VENOUS INSUFFICIENCY OF BOTH LOWER EXTREMITIES: ICD-10-CM

## 2022-01-06 DIAGNOSIS — H81.09 MENIERE'S DISEASE, UNSPECIFIED LATERALITY: ICD-10-CM

## 2022-01-06 DIAGNOSIS — E78.00 PURE HYPERCHOLESTEROLEMIA: Chronic | ICD-10-CM

## 2022-01-06 DIAGNOSIS — Z98.890 HISTORY OF STRABISMUS SURGERY: ICD-10-CM

## 2022-01-06 DIAGNOSIS — Z96.1 PSEUDOPHAKIA, BOTH EYES: ICD-10-CM

## 2022-01-06 DIAGNOSIS — I70.0 ATHEROSCLEROSIS OF AORTA: ICD-10-CM

## 2022-01-06 DIAGNOSIS — H50.10 EXOTROPIA OF BOTH EYES: ICD-10-CM

## 2022-01-06 DIAGNOSIS — Z95.818 PRESENCE OF WATCHMAN LEFT ATRIAL APPENDAGE CLOSURE DEVICE: Chronic | ICD-10-CM

## 2022-01-06 DIAGNOSIS — D69.2 SENILE PURPURA: ICD-10-CM

## 2022-01-06 DIAGNOSIS — N32.81 OAB (OVERACTIVE BLADDER): ICD-10-CM

## 2022-01-06 DIAGNOSIS — M75.02 ADHESIVE CAPSULITIS OF LEFT SHOULDER: ICD-10-CM

## 2022-01-06 DIAGNOSIS — R51.0 POSITIONAL HEADACHE: Primary | ICD-10-CM

## 2022-01-06 DIAGNOSIS — M46.97 INFLAMMATORY SPONDYLOPATHY OF LUMBOSACRAL REGION: ICD-10-CM

## 2022-01-06 DIAGNOSIS — E13.42 DIABETIC POLYNEUROPATHY ASSOCIATED WITH OTHER SPECIFIED DIABETES MELLITUS: ICD-10-CM

## 2022-01-06 DIAGNOSIS — M48.061 SPINAL STENOSIS OF LUMBAR REGION WITHOUT NEUROGENIC CLAUDICATION: ICD-10-CM

## 2022-01-06 DIAGNOSIS — H43.813 POSTERIOR VITREOUS DETACHMENT, BILATERAL: ICD-10-CM

## 2022-01-06 DIAGNOSIS — M46.97 UNSPECIFIED INFLAMMATORY SPONDYLOPATHY, LUMBOSACRAL REGION: ICD-10-CM

## 2022-01-06 DIAGNOSIS — S51.812A SKIN TEAR OF FOREARM WITHOUT COMPLICATION, LEFT, INITIAL ENCOUNTER: ICD-10-CM

## 2022-01-06 DIAGNOSIS — I48.20 CHRONIC ATRIAL FIBRILLATION: ICD-10-CM

## 2022-01-06 DIAGNOSIS — R73.03 PREDIABETES: ICD-10-CM

## 2022-01-06 DIAGNOSIS — Q20.8 ABNORMALITY OF LEFT ATRIAL APPENDAGE: ICD-10-CM

## 2022-01-06 DIAGNOSIS — H35.033 HYPERTENSIVE RETINOPATHY OF BOTH EYES: ICD-10-CM

## 2022-01-06 DIAGNOSIS — Z79.02 ENCOUNTER FOR LONG-TERM (CURRENT) USE OF ANTIPLATELETS/ANTITHROMBOTICS: ICD-10-CM

## 2022-01-06 DIAGNOSIS — Z91.81 RISK FOR FALLS: ICD-10-CM

## 2022-01-06 DIAGNOSIS — M19.011 PRIMARY OSTEOARTHRITIS OF RIGHT SHOULDER: ICD-10-CM

## 2022-01-06 DIAGNOSIS — Z00.00 ENCOUNTER FOR PREVENTIVE HEALTH EXAMINATION: ICD-10-CM

## 2022-01-06 DIAGNOSIS — M81.6 LOCALIZED OSTEOPOROSIS, UNSPECIFIED PATHOLOGICAL FRACTURE PRESENCE: ICD-10-CM

## 2022-01-06 DIAGNOSIS — N18.30 STAGE 3 CHRONIC KIDNEY DISEASE, UNSPECIFIED WHETHER STAGE 3A OR 3B CKD: ICD-10-CM

## 2022-01-06 DIAGNOSIS — H35.3221 EXUDATIVE AGE-RELATED MACULAR DEGENERATION OF LEFT EYE WITH ACTIVE CHOROIDAL NEOVASCULARIZATION: ICD-10-CM

## 2022-01-06 DIAGNOSIS — M19.90 ARTHRITIS: ICD-10-CM

## 2022-01-06 PROBLEM — E11.42 DIABETIC POLYNEUROPATHY: Status: ACTIVE | Noted: 2022-01-06

## 2022-01-06 PROCEDURE — G0439 PR MEDICARE ANNUAL WELLNESS SUBSEQUENT VISIT: ICD-10-PCS | Mod: HCNC,S$GLB,, | Performed by: INTERNAL MEDICINE

## 2022-01-06 PROCEDURE — 1101F PT FALLS ASSESS-DOCD LE1/YR: CPT | Mod: HCNC,CPTII,S$GLB, | Performed by: INTERNAL MEDICINE

## 2022-01-06 PROCEDURE — 3288F FALL RISK ASSESSMENT DOCD: CPT | Mod: HCNC,CPTII,S$GLB, | Performed by: INTERNAL MEDICINE

## 2022-01-06 PROCEDURE — 99499 RISK ADDL DX/OHS AUDIT: ICD-10-PCS | Mod: S$GLB,,, | Performed by: INTERNAL MEDICINE

## 2022-01-06 PROCEDURE — 99999 PR PBB SHADOW E&M-EST. PATIENT-LVL V: ICD-10-PCS | Mod: PBBFAC,HCNC,, | Performed by: INTERNAL MEDICINE

## 2022-01-06 PROCEDURE — 1159F PR MEDICATION LIST DOCUMENTED IN MEDICAL RECORD: ICD-10-PCS | Mod: HCNC,CPTII,S$GLB, | Performed by: INTERNAL MEDICINE

## 2022-01-06 PROCEDURE — 3288F PR FALLS RISK ASSESSMENT DOCUMENTED: ICD-10-PCS | Mod: HCNC,CPTII,S$GLB, | Performed by: INTERNAL MEDICINE

## 2022-01-06 PROCEDURE — 1101F PR PT FALLS ASSESS DOC 0-1 FALLS W/OUT INJ PAST YR: ICD-10-PCS | Mod: HCNC,CPTII,S$GLB, | Performed by: INTERNAL MEDICINE

## 2022-01-06 PROCEDURE — 99999 PR PBB SHADOW E&M-EST. PATIENT-LVL V: CPT | Mod: PBBFAC,HCNC,, | Performed by: INTERNAL MEDICINE

## 2022-01-06 PROCEDURE — G0439 PPPS, SUBSEQ VISIT: HCPCS | Mod: HCNC,S$GLB,, | Performed by: INTERNAL MEDICINE

## 2022-01-06 PROCEDURE — 1159F MED LIST DOCD IN RCRD: CPT | Mod: HCNC,CPTII,S$GLB, | Performed by: INTERNAL MEDICINE

## 2022-01-06 PROCEDURE — 1126F PR PAIN SEVERITY QUANTIFIED, NO PAIN PRESENT: ICD-10-PCS | Mod: HCNC,CPTII,S$GLB, | Performed by: INTERNAL MEDICINE

## 2022-01-06 PROCEDURE — 1126F AMNT PAIN NOTED NONE PRSNT: CPT | Mod: HCNC,CPTII,S$GLB, | Performed by: INTERNAL MEDICINE

## 2022-01-06 PROCEDURE — 99499 UNLISTED E&M SERVICE: CPT | Mod: S$GLB,,, | Performed by: INTERNAL MEDICINE

## 2022-01-06 RX ORDER — INFLUENZA A VIRUS A/VICTORIA/2570/2019 IVR-215 (H1N1) ANTIGEN (FORMALDEHYDE INACTIVATED), INFLUENZA A VIRUS A/TASMANIA/503/2020 IVR-221 (H3N2) ANTIGEN (FORMALDEHYDE INACTIVATED), INFLUENZA B VIRUS B/PHUKET/3073/2013 ANTIGEN (FORMALDEHYDE INACTIVATED), AND INFLUENZA B VIRUS B/WASHINGTON/02/2019 ANTIGEN (FORMALDEHYDE INACTIVATED) 60; 60; 60; 60 UG/.7ML; UG/.7ML; UG/.7ML; UG/.7ML
INJECTION, SUSPENSION INTRAMUSCULAR
COMMUNITY
Start: 2021-09-17 | End: 2022-08-23 | Stop reason: ALTCHOICE

## 2022-01-06 NOTE — Clinical Note
She has severe debility with right shoulder for which I am sending her back to ortho.  She also gets headache from bending forward and has severe balance disorder, so sent her for neuro evaluation.

## 2022-01-10 PROBLEM — S51.812A SKIN TEAR OF FOREARM WITHOUT COMPLICATION, LEFT, INITIAL ENCOUNTER: Status: ACTIVE | Noted: 2022-01-10

## 2022-01-10 PROBLEM — D69.2 SENILE PURPURA: Status: ACTIVE | Noted: 2022-01-10

## 2022-01-10 NOTE — PROGRESS NOTES
"Jenniffer Jimenez presented for a  Medicare AWV and comprehensive Health Risk Assessment today. The following components were reviewed and updated:    · Medical history  · Family History  · Social history  · Allergies and Current Medications  · Health Risk Assessment  · Health Maintenance  · Care Team     ** See Completed Assessments for Annual Wellness Visit within the encounter summary.**       The following assessments were completed:  · Living Situation  · CAGE  · Depression Screening  · Timed Get Up and Go  · Whisper Test  · Cognitive Function Screening  · Nutrition Screening  · ADL Screening  · PAQ Screening    Vitals:    01/06/22 1031   BP: 110/70   BP Location: Right arm   Patient Position: Sitting   BP Method: Medium (Manual)   Pulse: 77   Temp: 97.7 °F (36.5 °C)   TempSrc: Temporal   SpO2: 100%   Weight: 73.2 kg (161 lb 6 oz)   Height: 5' 5.5" (1.664 m)     Body mass index is 26.45 kg/m².    89-year-old female patient coming in for annual wellness exam for [facility].  Patient is living alone and has some problems taking care of herself because of her balance and severe limitations with her left shoulder.  She had a prior fracture left shoulder in I believe 2008 and began having pain in mid December resulting in a ER visit and a shot by an orthopedist which have not given her much relief.  She was told that they thought it was bursitis.  She has very little movement of the left shoulder without severe pain.    Patient says she has been having pain in her head, mostly coronal pain, on bending forward for about the last year and this seems to be getting progressively worse.  She has no other neurologic problems with this that she is aware of.    Patient also has severe balance problems resulting in prior falls which she is now avoiding by using a cane in the house and a walker when she goes out of the house.  She is unable to get around by car and got here today by the [employer] bus.    She was told she was diabetic " though her glucose is a been normal recently.  She is seen by multiple specialists and subspecialists.  Patient has had her COVID shots but has not had her booster yet.  I advised her to proceed with that if she is able to make arrangements.    Patient just a day or 2 ago had a skin tear on her left wrist from doing something in her house but not a fall or loss of balance.  This is resulted a minor tear which she is covering with a Band-Aid.  She has multiple bruising in her arms especially in her hands.      Physical Exam  Constitutional:       Appearance: Normal appearance. She is well-developed and well-nourished. She is obese.   Eyes:      Extraocular Movements: EOM normal.      Pupils: Pupils are equal, round, and reactive to light.   Neck:      Thyroid: No thyromegaly.      Vascular: No JVD.   Cardiovascular:      Rate and Rhythm: Normal rate. Rhythm irregular.      Pulses: Intact distal pulses.      Heart sounds: Normal heart sounds. No murmur heard.  No gallop.    Pulmonary:      Breath sounds: Normal breath sounds. No wheezing or rales.   Abdominal:      General: Bowel sounds are normal.      Palpations: Abdomen is soft. There is no mass.      Tenderness: There is no abdominal tenderness.   Musculoskeletal:         General: Normal range of motion.      Cervical back: Normal range of motion and neck supple.      Right lower leg: No edema.      Left lower leg: No edema.   Lymphadenopathy:      Cervical: No cervical adenopathy.   Skin:     Findings: Bruising and lesion present. No erythema or rash.   Neurological:      Mental Status: She is alert and oriented to person, place, and time.      Cranial Nerves: No cranial nerve deficit.      Gait: Gait abnormal.      Deep Tendon Reflexes: Reflexes are normal and symmetric.   Psychiatric:         Mood and Affect: Mood and affect and mood normal.         Behavior: Behavior normal.         Judgment: Judgment normal.           Lab Results   Component Value Date     HGBA1C 5.7 (H) 09/30/2021    CHOL 193 04/29/2021    HDL 65 04/29/2021    LDLCALC 112.8 04/29/2021    TRIG 76 04/29/2021    CHOLHDL 33.7 04/29/2021      Results for orders placed in visit on 03/29/21    DXA Bone Density Spine And Hip    Narrative  EXAMINATION:  DEXA BONE DENSITY SPINE HIP    CLINICAL HISTORY:  Asymptomatic menopausal state.  89 y/o female with a left shoulder and humerus fracture at 61 y/o.  She had a hysterectomy at 34 y/o.  Pt's Mother had a hip fracture.  Pt is taking a Multivitamin and Vit D supplements.  She does not exercise or smoke.    TECHNIQUE:  DXA specification: epacube [id number]    Bone Mineral Density scanning was performed over the hip and lumbar spine.    Review of the images confirms satisfactory positioning and technique.    COMPARISON:  Comparison study done on 07/20/2018.    Lumbar spine:  BMD 1.215 g/cm2 and T-score 1.5.    Total Hip:        BMD 0.659 g/cm2 and T-score -2.3.    FINDINGS:  Lumbar spine (L1-L4):              BMD is 1.247 g/cm2, T-score is 1.8, and Z-score is 4.7.    Degenerative changes at the lumbar spine are noted, which may cause increased bone density in this region.    Possible compression deformity at L3.    Total hip:                               BMD is 0.643 g/cm2, T-score is -2.5, and Z-score is -0.1.    Femoral neck:                         BMD is 0.543 g/cm2, T-score is -2.8, and Z-score is -0.2.    FRAX:    41% risk of a major osteoporotic fracture in the next 10 years.    29% risk of hip fracture in the next 10 years.    Impression  1. Osteoporosis with very high fracture risk  2.  Degenerative changes at the lumbar spine are noted, which may cause increased bone density in this region.  3. Possible compression deformity at L3, similar to the study in 2018.  4. Compared with previous DXA, BMD at the lumbar spine has increased by 2.7%, and the BMD at the total hip has remained stable.    RECOMMENDATIONS:  RECOMMENDATIONS of [facility]  Rheumatology and Endocrinology Departments:    1. Recommend daily calcium intake 3100-1281 mg, dietary sources preferred; Vitamin D 1115-3461 IU daily.  2. Adequate exercise and fall precautions.  3.  Consider AP and lateral X-rays of the thoraco-lumbar spine to evaluate for possible occult vertebral compression fracture.  4. Recommend medical therapy for osteoporosis.  Given very high fracture risk based on FRAX (above 30% or 4.5%), would consider anabolic agents as first-line therapy including teriparatide, abaloparatide, or romosozumab.  Second line therapy includes Prolia, Reclast, or oral bisphosphonate.  5. Repeat BMD in 1-2 years    EXPLANATION OF RESULTS:  The T-score compares these results to the average bone density of a 20-29 year-old of the same gender. The Z-score compares this result to the average bone density to people of the same age, gender, and race. The amounts indicate the number of standard deviations above or below the mean.    * Osteoporosis is generally defined as having a T-score between less than or equal to -2.5.    * Osteopenia is generally defined as having a T-score between -1.0 and -2.5.    * The normal range is generally defined as having a T-score greater than or equal to -1.0.    * Calculated FRAX scores for fracture risk prediction may not be accurate in the setting of certain clinical factors such as pharmacologic therapy for osteoporosis, prior fragility fractures, high dose glucocorticoid use.      Electronically signed by: Escobar Mondragon  Date:    04/07/2021  Time:    15:24    No results found for this or any previous visit.                Diagnoses and health risks identified today and associated recommendations/orders:    1. Positional headache  Stable- followed by neurology  - Ambulatory referral/consult to Neurology; Future    2. Diabetic polyneuropathy associated with other specified diabetes mellitus  Stable- followed by neurology    3. Inflammatory spondylopathy of  lumbosacral region  Stable- followed by ortho    4. Chronic diastolic heart failure  Stable- followed by cardiology    5. Chronic atrial fibrillation  Stable- followed by cardiology    6. Atherosclerosis of aorta  Stable- followed by cardiology    7. 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 eye with active choroidal neovascularization  Stable- followed by opthal    8. Stage 3 chronic kidney disease, unspecified whether stage 3a or 3b CKD  Stable- followed by cardiology    9. Adhesive capsulitis of left shoulder  Stable- followed by ortho  - Ambulatory referral/consult to Orthopedics; Future    10. Spinal stenosis of lumbar region without neurogenic claudication  Stable- followed by ortho    11. History of TIA (transient ischemic attack)  Stable- followed by neurology    12. Cervicogenic headache  Stable- followed by neurology    13. Stable central retinal vein occlusion of left eye  Stable- followed by opthal    14. Refractive error  Stable- followed by opthal    15. Pseudophakia, both eyes  Stable- followed by opthal    16. Posterior vitreous detachment, bilateral  Stable- followed by opthal    17. Hypertensive retinopathy of both eyes  Stable- followed by opthal    18. Hypermetropia of right eye  Stable- followed by opthal    19. History of strabismus surgery  Stable- followed by opthal    20. Exotropia of both eyes  Stable- followed by opthal    21. Dry eye syndrome, unspecified laterality  Stable- followed by opthal    22. Branch retinal vein occlusion, unspecified complication status, unspecified laterality  Stable- followed by opthal    23. Meniere's disease, unspecified laterality  Stable- followed by PCP    24. Sensorineural hearing loss (SNHL) of both ears  Stable- followed by PCP    25. Chronic rhinitis  Stable- followed by PCP    26. Venous insufficiency of both lower extremities  Stable- followed by PCP    27. Pure hypercholesterolemia  Stable- followed by PCP    28. Primary  hypertension  Stable- followed by PCP    29. Abnormality of left atrial appendage  Stable- followed by cardiology    30. OAB (overactive bladder)  Stable- followed by PCP    31. Encounter for long-term (current) use of antiplatelets/antithrombotics  Stable- followed by cardiology    32. Prediabetes  Stable- followed by PCP    33. Overweight (BMI 25.0-29.9)  Stable- followed by PCP    34. Unspecified inflammatory spondylopathy, lumbosacral region  Stable- followed by ortho    35. Primary osteoarthritis of right shoulder  Stable- followed by ortho    36. Localized osteoporosis, unspecified pathological fracture presence  Stable- followed by ortho    37. Facet arthropathy, lumbosacral  Stable- followed by ortho    38. Arthritis  Stable- followed by PCP    39. Risk for falls  Stable- followed by PCP    40. Presence of Watchman left atrial appendage closure device  Stable- followed by cardiology    41. Gait instability  Stable- followed by PCP    42. Senile purpura  Stable- followed by PCP    43. Skin tear of forearm without complication, left, initial encounter  Stable- followed by PCP    44. Encounter for preventive health examination  Stable- followed by PCP      Provided [place] with a 5-10 year written screening schedule and personal prevention plan. Recommendations were developed using the USPSTF age appropriate recommendations. Education, counseling, and referrals were provided as needed. After Visit Summary printed and given to patient which includes a list of additional screenings\tests needed.    Follow up in about 1 year (around 1/6/2023) for hra.    Abundio Perez MD I offered to discuss advanced care planning, including how to pick a person who would make decisions for you if you were unable to make them for yourself, called a health care power of , and what kind of decisions you might make such as use of life sustaining treatments such as ventilators and tube feeding when faced with a life limiting  illness recorded on a living will that they will need to know. (How you want to be cared for as you near the end of your natural life)     X Patient is interested in learning more about how to make advanced directives.  I provided them paperwork and offered to discuss this with them.

## 2022-01-10 NOTE — PATIENT INSTRUCTIONS
Here for Health Risk Assessment/Annual Wellness Visit.  Health maintenance reviewed and updated. Follow up in one year.   Annual physical exam  Age appropriate prevention guidelines implemented, unless patient refused  Encourage Advanced directives  Labs ordered   Immunizations reviewed. Encourage yearly influenza vaccine.  Patient Counseling:  --Nutrition: Encourage healthy food choices, adequate water intake, moderate sodium/caffeine intake, diet low in saturated fat and cholesterol. Importance of caloric balance and sufficient intake of fresh fruits, vegetables, fiber, calcium, iron, and 1 mg of folate supplement per day (for females capable of pregnancy).  --Exercise: Stressed the importance of regular exercise.   --Substance Abuse: Abstinence from tobacco products. No more than 2 alcoholic drinks per day in men or 1 alcoholic drink per day in women. Avoid illicit drugs, driving or other dangerous activities under the influence. In the event of abuse, treatment offered.   --Sexuality: Safe sex practices to avoid sexually transmitted diseases; careful partner selection, use of condoms and contraceptive alternatives, avoidance of unintended pregnancy in fertile women of child-bearing age  --Injury prevention: encourage safety belts, safety helmets, smoke detector.  --Dental health: Discussed importance of regular tooth brushing X2 daily, flossing X1 daily, and routine dental visits.  --Encourage use of sun screen, wear sun protective clothing  --Encourage routine eye exams   Today's Health Risk Assessment/Annual Wellness identified risk factors which have been discussed with the patient. Long and short-term goals have been developed with the patient's input . The patient has agreed to work toward these goals to modify risk factors and improve her/his overall health. The patient verbalized understanding all of today's instructions.  The patient was encouraged to communicate with her/his physician and care team  regarding her condition(s) and treatment.  I provided the patient with my contact information today and encouraged her to contact me via phone or patient portal as needed for any questions relating to today's health risk assessment.      Counseling and Referral of Other Preventative  (Italic type indicates deductible and co-insurance are waived)    Patient Name: Jenniffer Jimenez  Today's Date: 1/10/2022    Health Maintenance       Date Due Completion Date    Foot Exam 06/06/2022 (Originally 2/25/1942) ---    Hemoglobin A1c 03/30/2022 9/30/2021    Lipid Panel 04/29/2022 4/29/2021    Eye Exam 08/17/2022 8/17/2021    Diabetes Urine Screening 11/11/2022 11/11/2021    TETANUS VACCINE 03/11/2028 3/11/2018    Override on 11/7/2016: Declined        Orders Placed This Encounter   Procedures    Ambulatory referral/consult to Neurology    Ambulatory referral/consult to Orthopedics     The following information is provided to all patients.  This information is to help you find resources for any of the problems found today that may be affecting your health:                Living healthy guide: www.[facility].[place].gov      Understanding Diabetes: www.diabetes.org      Eating healthy: www.cdc.gov/healthyweight      CDC home safety checklist: www.cdc.gov/steadi/patient.html      Agency on Aging: www.goea.[place].gov      Alcoholics anonymous (AA): www.aa.org      Physical Activity: www.nichelle.nih.gov/xq2wrcx      Tobacco use: www.quitwithusla.org

## 2022-01-12 ENCOUNTER — CLINICAL SUPPORT (OUTPATIENT)
Dept: INTERNAL MEDICINE | Facility: CLINIC | Age: 87
End: 2022-01-12
Payer: MEDICARE

## 2022-01-12 ENCOUNTER — HOSPITAL ENCOUNTER (OUTPATIENT)
Dept: CARDIOLOGY | Facility: HOSPITAL | Age: 87
Discharge: HOME | End: 2022-01-12
Attending: INTERNAL MEDICINE
Payer: MEDICARE

## 2022-01-12 VITALS
HEART RATE: 68 BPM | DIASTOLIC BLOOD PRESSURE: 68 MMHG | SYSTOLIC BLOOD PRESSURE: 120 MMHG | HEIGHT: 65 IN | BODY MASS INDEX: 28.32 KG/M2 | WEIGHT: 170 LBS

## 2022-01-12 DIAGNOSIS — I50.32 CHRONIC DIASTOLIC HEART FAILURE: ICD-10-CM

## 2022-01-12 DIAGNOSIS — Z91.09 ALLERGY TO MITES: ICD-10-CM

## 2022-01-12 DIAGNOSIS — Z91.048 ALLERGY TO MOLD: ICD-10-CM

## 2022-01-12 DIAGNOSIS — J30.1 ALLERGIC RHINITIS DUE TO POLLEN, UNSPECIFIED SEASONALITY: Primary | ICD-10-CM

## 2022-01-12 LAB
ASCENDING AORTA: 3.38 CM
AV INDEX (PROSTH): 0.66
AV MEAN GRADIENT: 4 MMHG
AV PEAK GRADIENT: 8 MMHG
AV VALVE AREA: 2.23 CM2
AV VELOCITY RATIO: 0.55
BSA FOR ECHO PROCEDURE: 1.88 M2
CV ECHO LV RWT: 0.28 CM
DOP CALC AO PEAK VEL: 1.41 M/S
DOP CALC AO VTI: 31.01 CM
DOP CALC LVOT AREA: 3.4 CM2
DOP CALC LVOT DIAMETER: 2.07 CM
DOP CALC LVOT PEAK VEL: 0.77 M/S
DOP CALC LVOT STROKE VOLUME: 69.16 CM3
DOP CALCLVOT PEAK VEL VTI: 20.56 CM
E WAVE DECELERATION TIME: 170.54 MSEC
E/A RATIO: 3.02
E/E' RATIO: 10.23 M/S
ECHO LV POSTERIOR WALL: 0.7 CM (ref 0.6–1.1)
EJECTION FRACTION: 65 %
FRACTIONAL SHORTENING: 33 % (ref 28–44)
INTERVENTRICULAR SEPTUM: 0.73 CM (ref 0.6–1.1)
IVRT: 88.49 MSEC
LA MAJOR: 7.17 CM
LA MINOR: 7.04 CM
LA WIDTH: 4.67 CM
LEFT ATRIUM SIZE: 4.42 CM
LEFT ATRIUM VOLUME INDEX MOD: 55 ML/M2
LEFT ATRIUM VOLUME INDEX: 67.4 ML/M2
LEFT ATRIUM VOLUME MOD: 101.81 CM3
LEFT ATRIUM VOLUME: 124.65 CM3
LEFT INTERNAL DIMENSION IN SYSTOLE: 3.33 CM (ref 2.1–4)
LEFT VENTRICLE DIASTOLIC VOLUME INDEX: 63.38 ML/M2
LEFT VENTRICLE DIASTOLIC VOLUME: 117.25 ML
LEFT VENTRICLE MASS INDEX: 63 G/M2
LEFT VENTRICLE SYSTOLIC VOLUME INDEX: 24.4 ML/M2
LEFT VENTRICLE SYSTOLIC VOLUME: 45.18 ML
LEFT VENTRICULAR INTERNAL DIMENSION IN DIASTOLE: 4.98 CM (ref 3.5–6)
LEFT VENTRICULAR MASS: 116.97 G
LV LATERAL E/E' RATIO: 10.23 M/S
LV SEPTAL E/E' RATIO: 10.23 M/S
MV PEAK A VEL: 0.44 M/S
MV PEAK E VEL: 1.33 M/S
MV STENOSIS PRESSURE HALF TIME: 49.46 MS
MV VALVE AREA P 1/2 METHOD: 4.45 CM2
PISA TR MAX VEL: 3.27 M/S
PV PEAK D VEL: 0.68 M/S
RA MAJOR: 6.98 CM
RA PRESSURE: 8 MMHG
RA WIDTH: 3.38 CM
RIGHT VENTRICULAR END-DIASTOLIC DIMENSION: 3.51 CM
SINUS: 3.02 CM
STJ: 2.77 CM
TDI LATERAL: 0.13 M/S
TDI SEPTAL: 0.13 M/S
TDI: 0.13 M/S
TR MAX PG: 43 MMHG
TRICUSPID ANNULAR PLANE SYSTOLIC EXCURSION: 1.61 CM
TV REST PULMONARY ARTERY PRESSURE: 51 MMHG

## 2022-01-12 PROCEDURE — 93306 TTE W/DOPPLER COMPLETE: CPT | Mod: HCNC

## 2022-01-12 PROCEDURE — 93306 ECHO (CUPID ONLY): ICD-10-PCS | Mod: 26,HCNC,, | Performed by: INTERNAL MEDICINE

## 2022-01-12 PROCEDURE — 99499 UNLISTED E&M SERVICE: CPT | Mod: HCNC,S$GLB,, | Performed by: ALLERGY & IMMUNOLOGY

## 2022-01-12 PROCEDURE — 95115 PR IMMUNOTHERAPY, ONE INJECTION: ICD-10-PCS | Mod: HCNC,S$GLB,, | Performed by: FAMILY MEDICINE

## 2022-01-12 PROCEDURE — 95115 IMMUNOTHERAPY ONE INJECTION: CPT | Mod: HCNC,S$GLB,, | Performed by: FAMILY MEDICINE

## 2022-01-12 PROCEDURE — 93306 TTE W/DOPPLER COMPLETE: CPT | Mod: 26,HCNC,, | Performed by: INTERNAL MEDICINE

## 2022-01-12 PROCEDURE — 99499 NO LOS: ICD-10-PCS | Mod: HCNC,S$GLB,, | Performed by: ALLERGY & IMMUNOLOGY

## 2022-01-12 NOTE — PROGRESS NOTES
SQ-RT UPPER ARM  allergy injection red vial # 1/1 DM/M/ 0.35 ml given,tolerated well. Pt. Waited 30 minutes, no local reaction noted

## 2022-01-13 ENCOUNTER — EXTERNAL HOME HEALTH (OUTPATIENT)
Dept: HOME HEALTH SERVICES | Facility: HOSPITAL | Age: 87
End: 2022-01-13
Payer: MEDICARE

## 2022-01-14 ENCOUNTER — OFFICE VISIT (OUTPATIENT)
Dept: CARDIOLOGY | Facility: CLINIC | Age: 87
End: 2022-01-14
Payer: MEDICARE

## 2022-01-14 VITALS
HEIGHT: 65 IN | SYSTOLIC BLOOD PRESSURE: 127 MMHG | BODY MASS INDEX: 26.82 KG/M2 | HEART RATE: 71 BPM | WEIGHT: 161 LBS | DIASTOLIC BLOOD PRESSURE: 73 MMHG

## 2022-01-14 DIAGNOSIS — E78.00 PURE HYPERCHOLESTEROLEMIA: ICD-10-CM

## 2022-01-14 DIAGNOSIS — Z95.818 PRESENCE OF WATCHMAN LEFT ATRIAL APPENDAGE CLOSURE DEVICE: Chronic | ICD-10-CM

## 2022-01-14 DIAGNOSIS — I48.20 CHRONIC ATRIAL FIBRILLATION: Chronic | ICD-10-CM

## 2022-01-14 DIAGNOSIS — I70.0 ATHEROSCLEROSIS OF AORTA: Chronic | ICD-10-CM

## 2022-01-14 DIAGNOSIS — N18.32 STAGE 3B CHRONIC KIDNEY DISEASE: ICD-10-CM

## 2022-01-14 DIAGNOSIS — I10 PRIMARY HYPERTENSION: ICD-10-CM

## 2022-01-14 DIAGNOSIS — I50.32 CHRONIC DIASTOLIC HEART FAILURE: Primary | Chronic | ICD-10-CM

## 2022-01-14 PROCEDURE — 99214 OFFICE O/P EST MOD 30 MIN: CPT | Mod: HCNC,S$GLB,, | Performed by: INTERNAL MEDICINE

## 2022-01-14 PROCEDURE — 1160F RVW MEDS BY RX/DR IN RCRD: CPT | Mod: HCNC,CPTII,S$GLB, | Performed by: INTERNAL MEDICINE

## 2022-01-14 PROCEDURE — 3288F FALL RISK ASSESSMENT DOCD: CPT | Mod: HCNC,CPTII,S$GLB, | Performed by: INTERNAL MEDICINE

## 2022-01-14 PROCEDURE — 99214 PR OFFICE/OUTPT VISIT, EST, LEVL IV, 30-39 MIN: ICD-10-PCS | Mod: HCNC,S$GLB,, | Performed by: INTERNAL MEDICINE

## 2022-01-14 PROCEDURE — 3288F PR FALLS RISK ASSESSMENT DOCUMENTED: ICD-10-PCS | Mod: HCNC,CPTII,S$GLB, | Performed by: INTERNAL MEDICINE

## 2022-01-14 PROCEDURE — 1126F PR PAIN SEVERITY QUANTIFIED, NO PAIN PRESENT: ICD-10-PCS | Mod: HCNC,CPTII,S$GLB, | Performed by: INTERNAL MEDICINE

## 2022-01-14 PROCEDURE — 1101F PR PT FALLS ASSESS DOC 0-1 FALLS W/OUT INJ PAST YR: ICD-10-PCS | Mod: HCNC,CPTII,S$GLB, | Performed by: INTERNAL MEDICINE

## 2022-01-14 PROCEDURE — 99999 PR PBB SHADOW E&M-EST. PATIENT-LVL III: CPT | Mod: PBBFAC,HCNC,, | Performed by: INTERNAL MEDICINE

## 2022-01-14 PROCEDURE — 99999 PR PBB SHADOW E&M-EST. PATIENT-LVL III: ICD-10-PCS | Mod: PBBFAC,HCNC,, | Performed by: INTERNAL MEDICINE

## 2022-01-14 PROCEDURE — 1126F AMNT PAIN NOTED NONE PRSNT: CPT | Mod: HCNC,CPTII,S$GLB, | Performed by: INTERNAL MEDICINE

## 2022-01-14 PROCEDURE — 1159F PR MEDICATION LIST DOCUMENTED IN MEDICAL RECORD: ICD-10-PCS | Mod: HCNC,CPTII,S$GLB, | Performed by: INTERNAL MEDICINE

## 2022-01-14 PROCEDURE — 1160F PR REVIEW ALL MEDS BY PRESCRIBER/CLIN PHARMACIST DOCUMENTED: ICD-10-PCS | Mod: HCNC,CPTII,S$GLB, | Performed by: INTERNAL MEDICINE

## 2022-01-14 PROCEDURE — 1101F PT FALLS ASSESS-DOCD LE1/YR: CPT | Mod: HCNC,CPTII,S$GLB, | Performed by: INTERNAL MEDICINE

## 2022-01-14 PROCEDURE — 1159F MED LIST DOCD IN RCRD: CPT | Mod: HCNC,CPTII,S$GLB, | Performed by: INTERNAL MEDICINE

## 2022-01-14 RX ORDER — PRAVASTATIN SODIUM 40 MG/1
40 TABLET ORAL DAILY
Qty: 90 TABLET | Refills: 3 | Status: SHIPPED | OUTPATIENT
Start: 2022-01-14 | End: 2023-04-03

## 2022-01-14 NOTE — PROGRESS NOTES
Subjective:   01/14/2022     Patient ID:  Jenniffer Jimenez is a 89 y.o. female who presents for evaulation of Hyperlipidemia      She presented for evaluation of chest pain in February of 2021.  She had noted increasing swelling in her legs.  She had taken additional furosemide.  She is status post Watchman device placement in December of 2020.  She is off anticoagulation, now on aspirin only.  She has not had bleeding problems.  She does not have a history of chest pain.  Cardiac catheterization performed many years ago was normal.  Echocardiography continue show evidence for diastolic dysfunction with increased PA pressures and increased left atrial size.  She has not had increasing shortness of breath.  Her weight has been stable, taking additional furosemide and aldosterone is needed.  Laboratory work recently has shown stable renal to and potassium.     Hypercholesterolemia is on present, on moderate dose statin therapy.  Prescription refilled     She does have chronic atrial fibrillation is status post Watchman device as noted above.  Her heart rate is well controlled.     She was felt to have acute on chronic chronic diastolic heart failure.  My recommendations then were:  1.  Discontinue potassium chloride  2.  Take furosemide 20 mg twice a day to get rid of fluid, decrease to once a day when fluid improved.  3.  Take spironolactone 25 mg 1 with each furosemide.  4.  Call me if you have recurrent chest pain.  5.  Weigh yourself daily.  Record this and bring it in with your next visit     I saw her 2 weeks after the initial presentation.  She was markedly improved.  Recommendations then were:    1.  Decrease Lasix/furosemide and Aldactone/spironolactone to 1 a day as long as weight stays below 156 lb, okay to increase to twice a day for increased weight.  2.  Please do a blood test for me today to check kidney function.    She developed an episode of lightheadedness and her weight target was changed to  160lb.      Echocardiogram from January 2022:  · The left ventricle is normal in size with normal systolic function.  · The estimated ejection fraction is 65%.  · Severe left atrial enlargement.  · Grade III left ventricular diastolic dysfunction.  · The estimated PA systolic pressure is 51 mmHg.  · Normal right ventricular size with normal right ventricular systolic function.  · There is mild pulmonary hypertension.  · Intermediate central venous pressure (8 mmHg).  · Moderate right atrial enlargement.  · Moderate tricuspid regurgitation.  · Mild mitral regurgitation.       Recent carotid ultrasound shows mild bilateral disease:  There is 20-39% right Internal Carotid Stenosis.  There is 40-49% left Internal Carotid Stenosis.      Hyperlipidemia  Pertinent negatives include no chest pain.       Patient Active Problem List   Diagnosis    Pure hypercholesterolemia    Pseudophakia, both eyes    Stable central retinal vein occlusion of left eye    Chronic rhinitis    Hearing loss, sensorineural    Primary hypertension    Arthritis    Exotropia of both eyes    Gait instability    Meniere's disease    Facet arthropathy, lumbosacral    Lumbar spinal stenosis    Hypermetropia of right eye    Chronic atrial fibrillation    Stage 3b chronic kidney disease    Atherosclerosis of aorta    Chronic diastolic heart failure    History of TIA (transient ischemic attack)    Osteoporosis    Primary osteoarthritis of right shoulder    History of strabismus surgery    Encounter for long-term (current) use of antiplatelets/antithrombotics    Prediabetes    Refractive error    Posterior vitreous detachment, bilateral    Dry eye syndrome    Overweight (BMI 25.0-29.9)    Risk for falls    OAB (overactive bladder)    Venous insufficiency of both lower extremities    BRVO (branch retinal vein occlusion)    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 eye with active choroidal neovascularization     "Hypertensive retinopathy of both eyes    Unspecified inflammatory spondylopathy, lumbosacral region    Presence of Watchman left atrial appendage closure device    Abnormality of left atrial appendage    Normocytic anemia    Cervicogenic headache    Diabetic polyneuropathy    Senile purpura    Skin tear of forearm without complication, left, initial encounter        Review of patient's allergies indicates:   Allergen Reactions    Opioids - morphine analogues Other (See Comments)     Bowel issues; bowel obstruction    Tizanidine Other (See Comments)     "Lips were numb,  Almost passed out."    Tramadol Hallucinations    Beta-blockers (beta-adrenergic blocking agts) Other (See Comments)     Can not go on beta blockers for long period of time - due to taking allergy injections    Morphine     Opioids-meperidine and related          Current Outpatient Medications:     aspirin (ECOTRIN) 81 MG EC tablet, Take 1 tablet (81 mg total) by mouth once daily., Disp: 360 tablet, Rfl: 0    cholecalciferol, vitamin D3, (VITAMIN D3 ORAL), Take 1 tablet by mouth once daily., Disp: , Rfl:     diclofenac sodium (VOLTAREN) 1 % Gel, Apply 2 g topically 4 (four) times daily. Use gloves to apply for 10 days, Disp: 100 g, Rfl: 1    fluticasone propionate (FLONASE) 50 mcg/actuation nasal spray, USE 1 SPRAY IN EACH NOSTRIL ONE TIME DAILY, Disp: 32 g, Rfl: 11    furosemide (LASIX) 20 MG tablet, Take 1 tablet (20 mg total) by mouth 2 (two) times daily before meals. (Patient taking differently: Take 20 mg by mouth once daily. And as needed), Disp: 180 tablet, Rfl: 3    multivitamin (THERAGRAN) per tablet, Take 1 tablet by mouth once daily., Disp: , Rfl:     pravastatin (PRAVACHOL) 40 MG tablet, TAKE 1 TABLET EVERY DAY, Disp: 90 tablet, Rfl: 3    psyllium 0.52 gram capsule, Take 9 g by mouth once daily. Pt takes 3 capsules 3 times a day, Disp: , Rfl:     spironolactone (ALDACTONE) 25 MG tablet, Take 1 tablet (25 mg total) " by mouth 2 (two) times daily before meals., Disp: 180 tablet, Rfl: 3    vit C/E/Zn/coppr/lutein/zeaxan (OCUVITE LUTEIN AND ZEAXANTHIN ORAL), Take 1 capsule by mouth once daily. Lutien 25 mg, Zeaxanthin 5 mg, Disp: , Rfl:     vitamin E 400 UNIT capsule, Take 400 Units by mouth once daily., Disp: , Rfl:     FLUZONE HIGHDOSE QUAD 21-22  mcg/0.7 mL Syrg, , Disp: , Rfl:      Objective:   Review of Systems   Cardiovascular: Negative for chest pain, claudication, cyanosis, dyspnea on exertion, irregular heartbeat, leg swelling, near-syncope, orthopnea, palpitations, paroxysmal nocturnal dyspnea and syncope.   Musculoskeletal: Positive for arthritis and muscle cramps.   Gastrointestinal: Positive for constipation and diarrhea.   Neurological: Positive for headaches, loss of balance, numbness and paresthesias.       Vitals:    01/14/22 0828   BP: 127/73   Pulse: 71       Physical Exam  Vitals reviewed.   Constitutional:       General: She is not in acute distress.     Appearance: She is well-developed.   HENT:      Head: Normocephalic and atraumatic.      Nose: Nose normal.   Eyes:      Conjunctiva/sclera: Conjunctivae normal.      Pupils: Pupils are equal, round, and reactive to light.   Neck:      Vascular: No JVD.   Cardiovascular:      Rate and Rhythm: Normal rate and regular rhythm.      Pulses: Intact distal pulses.      Heart sounds: Normal heart sounds. No murmur heard.  No friction rub. No gallop.       Comments: Jugular venous pressure is normal.  No edema  Pulmonary:      Effort: Pulmonary effort is normal. No respiratory distress.      Breath sounds: Normal breath sounds. No wheezing or rales.   Chest:      Chest wall: No tenderness.   Abdominal:      General: Bowel sounds are normal. There is no distension.      Palpations: Abdomen is soft.      Tenderness: There is no abdominal tenderness.   Musculoskeletal:         General: No tenderness or deformity. Normal range of motion.      Cervical back: Normal  range of motion and neck supple.      Right lower leg: Edema (1+ bilateral edema) present.      Left lower leg: Edema present.   Skin:     General: Skin is warm and dry.      Findings: No erythema or rash.   Neurological:      Mental Status: She is alert and oriented to person, place, and time.      Cranial Nerves: No cranial nerve deficit.      Motor: No abnormal muscle tone.      Coordination: Coordination normal.   Psychiatric:         Behavior: Behavior normal.         Thought Content: Thought content normal.         Judgment: Judgment normal.       Component      Latest Ref Rng & Units 12/23/2021 11/11/2021 9/30/2021   WBC      3.90 - 12.70 K/uL 6.14  3.19 (L)   RBC      4.00 - 5.40 M/uL 3.75 (L)  3.60 (L)   Hemoglobin      12.0 - 16.0 g/dL 11.2 (L)  10.7 (L)   Hematocrit      37.0 - 48.5 % 34.3 (L)  32.8 (L)   MCV      82 - 98 fL 92  91   MCH      27.0 - 31.0 pg 29.9  29.7   MCHC      32.0 - 36.0 g/dL 32.7  32.6   RDW      11.5 - 14.5 % 15.0 (H)  15.1 (H)   Platelets      150 - 450 K/uL 212  192   MPV      9.2 - 12.9 fL 9.8  10.4   Immature Granulocytes      0.0 - 0.5 % 0.3  0.3   Gran # (ANC)      1.8 - 7.7 K/uL 4.8  2.0   Immature Grans (Abs)      0.00 - 0.04 K/uL 0.02  0.01   Lymph #      1.0 - 4.8 K/uL 0.9 (L)  0.8 (L)   Mono #      0.3 - 1.0 K/uL 0.4  0.3   Eos #      0.0 - 0.5 K/uL 0.1  0.1   Baso #      0.00 - 0.20 K/uL 0.03  0.02   nRBC      0 /100 WBC 0  0   Gran %      38.0 - 73.0 % 77.8 (H)  62.4   Lymph %      18.0 - 48.0 % 13.8 (L)  25.4   Mono %      4.0 - 15.0 % 6.8  9.4   Eosinophil %      0.0 - 8.0 % 0.8  1.9   Basophil %      0.0 - 1.9 % 0.5  0.6   Differential Method       Automated  Automated   Sodium      136 - 145 mmol/L 139 140 133 (L)   Potassium      3.5 - 5.1 mmol/L 4.5 4.5 4.2   Chloride      95 - 110 mmol/L 104 102 99   CO2      23 - 29 mmol/L 23 24 21 (L)   Glucose      70 - 110 mg/dL 94 102 94   BUN      8 - 23 mg/dL 32 (H) 19 25 (H)   Creatinine      0.5 - 1.4 mg/dL 1.2 1.4 1.1    Calcium      8.7 - 10.5 mg/dL 9.5 9.7 9.2   PROTEIN TOTAL      6.0 - 8.4 g/dL 7.4 7.2    Albumin      3.5 - 5.2 g/dL 4.2 4.0    BILIRUBIN TOTAL      0.1 - 1.0 mg/dL 0.6 0.6    Alkaline Phosphatase      55 - 135 U/L 110 116    AST      10 - 40 U/L 17 22    ALT      10 - 44 U/L 13 17    Anion Gap      8 - 16 mmol/L 12 14 13   eGFR if African American      >60 mL/min/1.73 m:2 46.3 (A) 38.4 (A) 51.4 (A)   eGFR if non African American      >60 mL/min/1.73 m:2 40.2 (A) 33.3 (A) 44.6 (A)   Microalbumin, Urine      ug/mL  <5.0    Creatinine, Urine      15.0 - 325.0 mg/dL  22.0    MICROALB/CREAT RATIO      0.0 - 30.0 ug/mg  Unable to calculate    Hemoglobin A1C External      4.0 - 5.6 %   5.7 (H)   Estimated Avg Glucose      68 - 131 mg/dL   117   Vitamin B-12      210 - 950 pg/mL  398    Vit D, 25-Hydroxy      30 - 96 ng/mL  43    Magnesium      1.6 - 2.6 mg/dL  2.1    CRP      0.0 - 8.2 mg/L 0.7     Sed Rate      0 - 36 mm/Hr 27     Uric Acid      2.4 - 5.7 mg/dL 7.9 (H)            Assessment and Plan:     Chronic diastolic heart failure  Comments:  Currently stable    Pure hypercholesterolemia  Comments:  On moderate dose statin therapy, no change    Primary hypertension  Comments:  Blood pressure appears well controlled    Chronic atrial fibrillation  Comments:  Rate controlled, status post Watchman    Atherosclerosis of aorta  Comments:  Continue blood pressure and lipid control    Presence of Watchman left atrial appendage closure device  Comments:  Continue aspirin    Stage 3b chronic kidney disease  Comments:  Appears stable         Follow up in about 6 months (around 7/14/2022).

## 2022-01-15 ENCOUNTER — PATIENT MESSAGE (OUTPATIENT)
Dept: CARDIOLOGY | Facility: CLINIC | Age: 87
End: 2022-01-15
Payer: MEDICARE

## 2022-01-15 ENCOUNTER — PATIENT MESSAGE (OUTPATIENT)
Dept: INTERNAL MEDICINE | Facility: CLINIC | Age: 87
End: 2022-01-15
Payer: MEDICARE

## 2022-01-18 NOTE — TELEPHONE ENCOUNTER
Encounter details require adjustment(s)/ updating by [facility] Staff  As of this time Protocols and CDM: did not populate or display   Adjustment(s) made: Department  CDM should display. Medication(s) delegated by the OR.  Will resend refill request encounter to P Centralized Refill Staff Pool.   [facility]   Note composed:8:57 AM 01/18/2022

## 2022-01-18 NOTE — TELEPHONE ENCOUNTER
No new care gaps identified.  Powered by [employer]. Reference number: [id number].   1/18/2022 8:58:05 AM CST

## 2022-01-21 ENCOUNTER — CLINICAL SUPPORT (OUTPATIENT)
Dept: INTERNAL MEDICINE | Facility: CLINIC | Age: 87
End: 2022-01-21
Payer: MEDICARE

## 2022-01-21 DIAGNOSIS — Z91.048 ALLERGY TO MOLD: ICD-10-CM

## 2022-01-21 DIAGNOSIS — J30.1 ALLERGIC RHINITIS DUE TO POLLEN, UNSPECIFIED SEASONALITY: Primary | ICD-10-CM

## 2022-01-21 DIAGNOSIS — Z91.09 ALLERGY TO MITES: ICD-10-CM

## 2022-01-21 PROCEDURE — 99999 PR PBB SHADOW E&M-EST. PATIENT-LVL I: CPT | Mod: PBBFAC,HCNC,,

## 2022-01-21 PROCEDURE — 99499 UNLISTED E&M SERVICE: CPT | Mod: HCNC,S$GLB,, | Performed by: ALLERGY & IMMUNOLOGY

## 2022-01-21 PROCEDURE — 95115 IMMUNOTHERAPY ONE INJECTION: CPT | Mod: HCNC,S$GLB,, | Performed by: FAMILY MEDICINE

## 2022-01-21 PROCEDURE — 95115 PR IMMUNOTHERAPY, ONE INJECTION: ICD-10-PCS | Mod: HCNC,S$GLB,, | Performed by: FAMILY MEDICINE

## 2022-01-21 PROCEDURE — 99499 NO LOS: ICD-10-PCS | Mod: HCNC,S$GLB,, | Performed by: ALLERGY & IMMUNOLOGY

## 2022-01-21 PROCEDURE — 99999 PR PBB SHADOW E&M-EST. PATIENT-LVL I: ICD-10-PCS | Mod: PBBFAC,HCNC,,

## 2022-01-21 NOTE — PROGRESS NOTES
SQ-RT UPPER ARM  allergy injection red vial # 1/1 DM/M/ 0.4 ml given,tolerated well. Pt. Waited 30 minutes, no local reaction noted

## 2022-01-25 NOTE — PROGRESS NOTES
Subjective:     Jenniffer Jimenez     Chief Complaint   Patient presents with    Left Shoulder - Pain       HPI    Jenniffer is a 89 y.o. female coming in today for left shoulder, neck and arm pain that began about 1 month(s) ago, referred by Dr. Perez. Hx of ORIF 1992 years ago for L humeral fracture. Her shoulder acutely became painful about 1 month ago. Pt. describes the pain as a 3/10 at rest, 8-9/10 with ROM achy pain that does radiate. She reports pain in her neck as well as paresthesias in her fingertips. Notes difficulty with fine motor movements of her left hand. There was not a fall/injury/ or trauma associated with the onset of symptoms. The pain is better with rest, tylenol and worse with ROM, raising arm. Pt notes she is unable to actively raise her arm. Pt reports 2 days relief with CSI to her left shoulder, performed by Hao Aguila PA-C. She has been seen in urgent care and the ED for this. ED recommended she be admitted to [facility] [place], but pt declined this. She has been doing home health OT which has worsened her pain. Pt. Denies any other musculoskeletal complaints at this time.     Joint instability? no  Mechanical locking/clicking? no   Affecting ADL's? yes  Affecting sleep? yes    Occupation: retired     Review of Systems   Constitutional: Negative for chills and fever.   HENT: Negative for hearing loss and tinnitus.    Eyes: Negative for blurred vision and photophobia.   Respiratory: Negative for cough and shortness of breath.    Cardiovascular: Negative for chest pain and leg swelling.   Gastrointestinal: Negative for abdominal pain, heartburn, nausea and vomiting.   Genitourinary: Negative for dysuria and hematuria.   Musculoskeletal: Positive for joint pain. Negative for back pain, falls, myalgias and neck pain.   Skin: Negative for rash.   Neurological: Negative for dizziness, tingling, focal weakness, weakness and headaches.   Endo/Heme/Allergies: Negative for environmental  allergies. Does not bruise/bleed easily.   Psychiatric/Behavioral: Negative for depression. The patient is not nervous/anxious.        PAST MEDICAL HISTORY:   Past Medical History:   Diagnosis Date    Amblyopia of left eye 4/10/2013    Anxiety     Arthritis     Facet arthropathy, Lumbosacral    Cataract     Central retinal vein occlusion of left eye     Depression     Diabetic polyneuropathy 2022    Exotropia of both eyes 2013    recession RSR 5.0mm w/ adj; recession LR os 5.0 w/ adj; resect MR os  4.0mm    Hearing loss     History of resection of small bowel     Hypertensive retinopathy of both eyes     Hypoglycemia     Macular degeneration     OA (osteoarthritis) of shoulder     Right    Osteoporosis     Posterior vitreous detachment of both eyes     Rhinitis     TIA (transient ischemic attack)      PAST SURGICAL HISTORY:   Past Surgical History:   Procedure Laterality Date    APPENDECTOMY      CARDIAC CATHETERIZATION      CATARACT EXTRACTION W/  INTRAOCULAR LENS IMPLANT Bilateral      SECTION, CLASSIC      CLOSURE OF LEFT ATRIAL APPENDAGE USING DEVICE N/A 2020    Procedure: Left atrial appendage closure device;  Surgeon: Abundio Curtis MD;  Location: [facility] CATH LAB;  Service: Cardiology;  Laterality: N/A;    HYSTERECTOMY      INNER EAR SURGERY      JOINT REPLACEMENT      LEFT KNEE REPLACEMENT IN  -    OOPHORECTOMY      SINUS SURGERY      STRABISMUS SURGERY  13    RSR recession 5 mm, LLR recession 5 mm and LMR resection 4mm    STRABISMUS SURGERY  2014    recess LR OD 6mm    TONSILLECTOMY      watchman surgery N/A 2020     FAMILY HISTORY:   Family History   Problem Relation Age of Onset    Hypertension Mother     Hypertension Father     Liver disease Sister     Diabetes Sister     Heart disease Sister     Diabetes Brother     Breast cancer Maternal Aunt     No Known Problems Maternal Uncle     No Known Problems Paternal Aunt     No  Known Problems Paternal Uncle     No Known Problems Maternal Grandmother     No Known Problems Maternal Grandfather     No Known Problems Paternal Grandmother     No Known Problems Paternal Grandfather     No Known Problems Daughter     No Known Problems Son     Heart disease Sister     No Known Problems Son     No Known Problems Son     Cancer Neg Hx         in first degree relatives    Melanoma Neg Hx     Psoriasis Neg Hx     Lupus Neg Hx     Amblyopia Neg Hx     Blindness Neg Hx     Cataracts Neg Hx     Glaucoma Neg Hx     Macular degeneration Neg Hx     Retinal detachment Neg Hx     Strabismus Neg Hx     Stroke Neg Hx     Thyroid disease Neg Hx      SOCIAL HISTORY:   Social History     Socioeconomic History    Marital status:    Occupational History    Occupation: retired   Tobacco Use    Smoking status: Former Smoker     Types: Cigarettes     Quit date: 10/29/1982     Years since quittin.2    Smokeless tobacco: Never Used   Substance and Sexual Activity    Alcohol use: Not Currently     Comment: socially    Drug use: No    Sexual activity: Never   Other Topics Concern    Are you pregnant or think you may be? No    Breast-feeding No     Social Determinants of Health     Financial Resource Strain: Low Risk     Difficulty of Paying Living Expenses: Not hard at all   Food Insecurity: No Food Insecurity    Worried About Running Out of Food in the Last Year: Never true    Ran Out of Food in the Last Year: Never true   Transportation Needs: No Transportation Needs    Lack of Transportation (Medical): No    Lack of Transportation (Non-Medical): No   Physical Activity: Inactive    Days of Exercise per Week: 0 days    Minutes of Exercise per Session: 0 min   Stress: Stress Concern Present    Feeling of Stress : To some extent   Social Connections: Socially Isolated    Frequency of Communication with Friends and Family: Once a week    Frequency of Social Gatherings  with Friends and Family: Once a week    Attends Rastafarian Services: Never    Active Member of Clubs or Organizations: No    Attends Club or Organization Meetings: Never    Marital Status:    Housing Stability: Unknown    Unable to Pay for Housing in the Last Year: No    Unstable Housing in the Last Year: No       MEDICATIONS:   Current Outpatient Medications:     aspirin (ECOTRIN) 81 MG EC tablet, Take 1 tablet (81 mg total) by mouth once daily., Disp: 360 tablet, Rfl: 0    cholecalciferol, vitamin D3, (VITAMIN D3 ORAL), Take 1 tablet by mouth once daily., Disp: , Rfl:     diclofenac sodium (VOLTAREN) 1 % Gel, Apply 2 g topically 4 (four) times daily. Use gloves to apply for 10 days, Disp: 100 g, Rfl: 1    fluticasone propionate (FLONASE) 50 mcg/actuation nasal spray, USE 1 SPRAY IN EACH NOSTRIL ONE TIME DAILY, Disp: 32 g, Rfl: 11    FLUZONE HIGHDOSE QUAD 21-22  mcg/0.7 mL Syrg, , Disp: , Rfl:     furosemide (LASIX) 20 MG tablet, Take 1 tablet (20 mg total) by mouth 2 (two) times daily before meals. (Patient taking differently: Take 20 mg by mouth once daily. And as needed), Disp: 180 tablet, Rfl: 3    multivitamin (THERAGRAN) per tablet, Take 1 tablet by mouth once daily., Disp: , Rfl:     pravastatin (PRAVACHOL) 40 MG tablet, Take 1 tablet (40 mg total) by mouth once daily., Disp: 90 tablet, Rfl: 3    psyllium 0.52 gram capsule, Take 9 g by mouth once daily. Pt takes 3 capsules 3 times a day, Disp: , Rfl:     spironolactone (ALDACTONE) 25 MG tablet, Take 1 tablet (25 mg total) by mouth 2 (two) times daily before meals., Disp: 180 tablet, Rfl: 3    vit C/E/Zn/coppr/lutein/zeaxan (OCUVITE LUTEIN AND ZEAXANTHIN ORAL), Take 1 capsule by mouth once daily. Lutien 25 mg, Zeaxanthin 5 mg, Disp: , Rfl:     vitamin E 400 UNIT capsule, Take 400 Units by mouth once daily., Disp: , Rfl:   ALLERGIES:   Review of patient's allergies indicates:   Allergen Reactions    Opioids - morphine analogues  "Other (See Comments)     Bowel issues; bowel obstruction    Tizanidine Other (See Comments)     "Lips were numb,  Almost passed out."    Tramadol Hallucinations    Beta-blockers (beta-adrenergic blocking agts) Other (See Comments)     Can not go on beta blockers for long period of time - due to taking allergy injections    Morphine     Opioids-meperidine and related        Reviewed office visit on 21 with Hao Aguila PA-C: L shoulder posttraumatic osteoarthritis. CSI L shoulder    Reviewed office visit on 22 with Dr. Perez: Patient is living alone and has some problems taking care of herself because of her balance and severe limitations with her left shoulder.  She had a prior fracture left shoulder in I believe  and began having pain in mid December resulting in a ER visit and a shot by an orthopedist which have not given her much relief.  She was told that they thought it was bursitis.  She has very little movement of the left shoulder without severe pain.    IMAGIN. X-ray ordered due to left shoulder pain. (AP, scapular Y, and axillary views) taken 21, 21.   2. X-ray images were reviewed personally by me and then directly with patient.  3. FINDINGS: Generalized osteopenia.  Remote ORIF for healed fracture of the humeral shaft with long intramedullary krysten and single proximal anchoring screw, without evidence of acute hardware malalignment or loosening. There is stable appearance of long intramedullary krysten fixation with proximal stabilizing screw.  No periprosthetic fractures identified No acute displaced fracture, dislocation or destructive osseous process.  Minimal to mild degenerative change about the shoulder. There is narrowing of the glenohumeral joint.  There is mild arthropathy of the acromioclavicular joint.  The coracoclavicular interval is within normal limits. There are calcifications of the thoracic aorta.  The remaining visualized left hemithorax is within " "normal limits. No subcutaneous emphysema or radiodense retained foreign body.  No left apical pneumothorax.  4. IMPRESSION: Left humerus remote ORIF without detrimental change or acute osseous process seen. No evidence of a fracture or dislocation of the left shoulder.  Stable postop changes of the left shoulder.    Objective:     VITAL SIGNS: /68   Ht 5' 5" (1.651 m)   Wt 73 kg (161 lb)   LMP  (LMP Unknown)   BMI 26.79 kg/m²    General    Nursing note and vitals reviewed.  Constitutional: She is oriented to person, place, and time. She appears well-developed and well-nourished.   HENT:   Head: Normocephalic and atraumatic.   no nasal discharge, no external ear redness or discharge   Eyes:   EOM is full and smooth  No eye redness or discharge   Neck: Neck supple. No tracheal deviation present.   Cardiovascular: Normal rate.    2+ Radial pulse bilaterally  2+ Dorsalis Pedis pulse bilaterally  No LE edema appreciated   Pulmonary/Chest: Effort normal. No respiratory distress.   Abdominal: She exhibits no distension.   No rigidity   Neurological: She is alert and oriented to person, place, and time. She exhibits normal muscle tone. Coordination normal.   See details below   Psychiatric: She has a normal mood and affect. Her behavior is normal.             MUSCULOSKELETAL EXAM  Shoulder: left SHOULDER  The affected shoulder is compared to the contralateral shoulder.    Observation:      SHOULDER  No ecchymosis, edema, or erythema throughout the shoulder girdle.  No sternal, clavicular, or acromial deformities bilaterally.  + atrophy of the pectorals, deltoids, supraspinatus and infraspinatus on the left  + asymmetry of shoulders bilaterally, elevated left shoulder.    Posture:  Increased thoracic kyphosis and Anterior head carriage      ROM (* = with pain):  CERVICAL SPINE   AROM in flexion, extension, sidebending, and rotation.    SHOULDER  Active flexion to 10° on left* (passove to 90 degrees) and " 180° on right.  Active abduction to 10° on left* (passive to 90 degrees) and 180° on right.  Active internal rotation to SI on left and T7 on right.    Active external rotation unable to perform on left and T4 on right.    No scapular dyskinesia or winging.    Tenderness:  No tenderness at the SC or AC joint  No tenderness over the clavicle   No tenderness over biceps tendon or bicipital groove  + tenderness over subacromial space  + left upper trapezius pain    Strength Testing (* = with pain):  Deltoid - 5/5 on left and 5/5 on right  Biceps - 5/5 on left and 5/5 on right  Triceps - 5/5 on left and 5/5 on right  Wrist extension - 5/5 on left and 5/5 on right  Wrist flexion - 5/5 on left and 5/5 on right   - 5/5 on left and 5/5 on right  Finger extension - 5/5 on left and 5/5 on right  Finger abduction - 5/5 on left and 5/5 on right    Special Tests:  Empty can test - positive  Full can test - positive  Bear hug test - positive  Belly press test - positive  Resisted internal rotation - positive  Resisted external rotation - positive  + drop arm test    Neer's test - unable to perform due to degrees ROM  Hawkin's-Sanjay test - positive  Cross-arm test- negative    OPitos test - negative  Biceps load 1 test - negative  Biceps load 2 test - negative  Gomez sheer test - negative    Sulcus sign - none  AP load and shift laxity - none  Anterior apprehension test - negative  Posterior apprehension test - negative    Speed's test - negative  Yergason's test - negative    Neurovascular Exam:  Spurlings test - positive on left, reproducing left shoulder and upper arm pain  Lhermittes test - positive  2+ radial pulses bilaterally  Sensation intact to light touch in the distal median, radial, and ulnar nerve distributions bilaterally.  Negative Tinel's at carpal tunnel  Negative Tinel's at cubital tunnel  2+/4 reflexes at triceps, biceps, and brachioradialis  Capillary refill intact <2 seconds in all digits  bilaterally    IMAGIN. X-ray ordered due to left neck pain. (AP, lateral, bilateral obliques, and odontoid  views) taken today.   2. X-ray images were reviewed personally by me and then directly with patient.  3. FINDINGS: X-ray images obtained demonstrate that the odontoid prevertebral soft tissues and posterior elements are intact.  There is mild DJD.  No fracture dislocation bone destruction seen.  No instability seen.  No trauma seen.  There is mild neural foraminal narrowing at C5-C6 and C6-C7.  4. IMPRESSION:  Cervical spondylosis with mild neural foraminal narrowing at C5-C6 and C6-C7    Assessment:      Encounter Diagnoses   Name Primary?    Chronic left shoulder pain Yes    Osteoarthritis of left shoulder, unspecified osteoarthritis type     Rotator cuff dysfunction, left     Neck pain     Spondylosis of cervical joint           Plan:   1. Chronic left shoulder pain with suspicion of underlying left rotator cuff tear on physical exam today as well as underlying DJD changes on previous shoulder x-rays.  Patient also has underlying neck pain and spondylosis that could  be contributing to her left shoulder pain as well.  Cervical spine and left shoulder MRIs ordered for further evaluation.  Only temporary relief with previous left shoulder CSI performed buy Hao Aguila PA-C on 21.  - recommend continuing home health OT as tolerated for left shoulder passive range of motion exercises  - recommend continuing over-the-counter Tylenol, ice or heat up to 20 minutes at a time, and OTC Voltaren 1% gel to 4 times a day  -  X-ray images of left shoulder taken 21 (AP, scapular Y, and axillary views) showed DJD changes at the glenohumeral and AC joints and stable appearance of long intramedullary krysten fixation with proximal stabilizing screw of the left humerus. Images were personally reviewed with patient.  -  X-ray images of cervical spine taken today (AP, lateral, bilateral obliques, and  odontoid  views) showed  cervical spondylosis with neural foraminal narrowing at C5-C6 and C6-C7. Images were personally reviewed with patient.     2. Pt. Given the following HEP:    A) Pendulum exercises: move dangling arm in small circles clockwise for 10 reps and counterclockwise for 10 reps, 1-2 times daily   B) passive shoulder ABduction and flexion ROM motion exercises: 10 reps, 1-2 times a day    [address] HOME EXERCISE PROGRAM (HEP):  The patient was taught a homegoing physical therapy regimen as described above. The patient demonstrated understanding of the exercises and proper technique of their execution. This interaction took 15 minutes.     3. Follow-up in 1-2 weeks for reevaluation and review of cervical and shoulder MRI results    4. Patient agreeable to today's plan and all questions were answered    This note is dictated using the M*Modal Fluency Direct word recognition program. There are word recognition mistakes that are occasionally missed on review.

## 2022-01-26 ENCOUNTER — OFFICE VISIT (OUTPATIENT)
Dept: SPORTS MEDICINE | Facility: CLINIC | Age: 87
End: 2022-01-26
Payer: MEDICARE

## 2022-01-26 ENCOUNTER — HOSPITAL ENCOUNTER (OUTPATIENT)
Dept: RADIOLOGY | Facility: HOSPITAL | Age: 87
Discharge: HOME | End: 2022-01-26
Attending: NEUROMUSCULOSKELETAL MEDICINE & OMM
Payer: MEDICARE

## 2022-01-26 ENCOUNTER — CLINICAL SUPPORT (OUTPATIENT)
Dept: INTERNAL MEDICINE | Facility: CLINIC | Age: 87
End: 2022-01-26
Payer: MEDICARE

## 2022-01-26 VITALS
SYSTOLIC BLOOD PRESSURE: 118 MMHG | HEIGHT: 65 IN | BODY MASS INDEX: 26.82 KG/M2 | DIASTOLIC BLOOD PRESSURE: 68 MMHG | WEIGHT: 161 LBS

## 2022-01-26 DIAGNOSIS — M54.2 NECK PAIN: ICD-10-CM

## 2022-01-26 DIAGNOSIS — M25.512 CHRONIC LEFT SHOULDER PAIN: Primary | ICD-10-CM

## 2022-01-26 DIAGNOSIS — Z91.09 ALLERGY TO MITES: ICD-10-CM

## 2022-01-26 DIAGNOSIS — J30.1 ALLERGIC RHINITIS DUE TO POLLEN, UNSPECIFIED SEASONALITY: Primary | ICD-10-CM

## 2022-01-26 DIAGNOSIS — M19.012 OSTEOARTHRITIS OF LEFT SHOULDER, UNSPECIFIED OSTEOARTHRITIS TYPE: ICD-10-CM

## 2022-01-26 DIAGNOSIS — M47.812 SPONDYLOSIS OF CERVICAL JOINT: ICD-10-CM

## 2022-01-26 DIAGNOSIS — Z91.048 ALLERGY TO MOLD: ICD-10-CM

## 2022-01-26 DIAGNOSIS — G89.29 CHRONIC LEFT SHOULDER PAIN: Primary | ICD-10-CM

## 2022-01-26 DIAGNOSIS — M67.912 ROTATOR CUFF DYSFUNCTION, LEFT: ICD-10-CM

## 2022-01-26 PROCEDURE — 99204 OFFICE O/P NEW MOD 45 MIN: CPT | Mod: 25,HCNC,S$GLB, | Performed by: NEUROMUSCULOSKELETAL MEDICINE & OMM

## 2022-01-26 PROCEDURE — 1160F PR REVIEW ALL MEDS BY PRESCRIBER/CLIN PHARMACIST DOCUMENTED: ICD-10-PCS | Mod: HCNC,CPTII,S$GLB, | Performed by: NEUROMUSCULOSKELETAL MEDICINE & OMM

## 2022-01-26 PROCEDURE — 72052 XR CERVICAL SPINE 5 VIEW WITH FLEX AND EXT: ICD-10-PCS | Mod: 26,HCNC,, | Performed by: RADIOLOGY

## 2022-01-26 PROCEDURE — 99499 NO LOS: ICD-10-PCS | Mod: HCNC,S$GLB,, | Performed by: ALLERGY & IMMUNOLOGY

## 2022-01-26 PROCEDURE — 99999 PR PBB SHADOW E&M-EST. PATIENT-LVL I: ICD-10-PCS | Mod: PBBFAC,HCNC,,

## 2022-01-26 PROCEDURE — 95115 PR IMMUNOTHERAPY, ONE INJECTION: ICD-10-PCS | Mod: HCNC,S$GLB,, | Performed by: FAMILY MEDICINE

## 2022-01-26 PROCEDURE — 1101F PR PT FALLS ASSESS DOC 0-1 FALLS W/OUT INJ PAST YR: ICD-10-PCS | Mod: HCNC,CPTII,S$GLB, | Performed by: NEUROMUSCULOSKELETAL MEDICINE & OMM

## 2022-01-26 PROCEDURE — 95115 IMMUNOTHERAPY ONE INJECTION: CPT | Mod: HCNC,S$GLB,, | Performed by: FAMILY MEDICINE

## 2022-01-26 PROCEDURE — 1160F RVW MEDS BY RX/DR IN RCRD: CPT | Mod: HCNC,CPTII,S$GLB, | Performed by: NEUROMUSCULOSKELETAL MEDICINE & OMM

## 2022-01-26 PROCEDURE — 99204 PR OFFICE/OUTPT VISIT, NEW, LEVL IV, 45-59 MIN: ICD-10-PCS | Mod: 25,HCNC,S$GLB, | Performed by: NEUROMUSCULOSKELETAL MEDICINE & OMM

## 2022-01-26 PROCEDURE — 1101F PT FALLS ASSESS-DOCD LE1/YR: CPT | Mod: HCNC,CPTII,S$GLB, | Performed by: NEUROMUSCULOSKELETAL MEDICINE & OMM

## 2022-01-26 PROCEDURE — 1159F MED LIST DOCD IN RCRD: CPT | Mod: HCNC,CPTII,S$GLB, | Performed by: NEUROMUSCULOSKELETAL MEDICINE & OMM

## 2022-01-26 PROCEDURE — 99999 PR PBB SHADOW E&M-EST. PATIENT-LVL IV: ICD-10-PCS | Mod: PBBFAC,HCNC,, | Performed by: NEUROMUSCULOSKELETAL MEDICINE & OMM

## 2022-01-26 PROCEDURE — 72052 X-RAY EXAM NECK SPINE 6/>VWS: CPT | Mod: 26,HCNC,, | Performed by: RADIOLOGY

## 2022-01-26 PROCEDURE — 1159F PR MEDICATION LIST DOCUMENTED IN MEDICAL RECORD: ICD-10-PCS | Mod: HCNC,CPTII,S$GLB, | Performed by: NEUROMUSCULOSKELETAL MEDICINE & OMM

## 2022-01-26 PROCEDURE — 3288F FALL RISK ASSESSMENT DOCD: CPT | Mod: HCNC,CPTII,S$GLB, | Performed by: NEUROMUSCULOSKELETAL MEDICINE & OMM

## 2022-01-26 PROCEDURE — 99499 UNLISTED E&M SERVICE: CPT | Mod: HCNC,S$GLB,, | Performed by: ALLERGY & IMMUNOLOGY

## 2022-01-26 PROCEDURE — 99999 PR PBB SHADOW E&M-EST. PATIENT-LVL IV: CPT | Mod: PBBFAC,HCNC,, | Performed by: NEUROMUSCULOSKELETAL MEDICINE & OMM

## 2022-01-26 PROCEDURE — 3288F PR FALLS RISK ASSESSMENT DOCUMENTED: ICD-10-PCS | Mod: HCNC,CPTII,S$GLB, | Performed by: NEUROMUSCULOSKELETAL MEDICINE & OMM

## 2022-01-26 PROCEDURE — 99999 PR PBB SHADOW E&M-EST. PATIENT-LVL I: CPT | Mod: PBBFAC,HCNC,,

## 2022-01-26 PROCEDURE — 72052 X-RAY EXAM NECK SPINE 6/>VWS: CPT | Mod: TC,HCNC,PO

## 2022-01-26 NOTE — PROGRESS NOTES
SQ-RT UPPER ARM  allergy injection red vial #1/1 DM/M/ 0.45 ml  given,tolerated well. Pt. Waited 30 minutes, no local reaction noted

## 2022-01-27 ENCOUNTER — TELEPHONE (OUTPATIENT)
Dept: SPORTS MEDICINE | Facility: CLINIC | Age: 87
End: 2022-01-27
Payer: MEDICARE

## 2022-01-27 RX ORDER — FLUTICASONE PROPIONATE 50 MCG
SPRAY, SUSPENSION (ML) NASAL
Qty: 32 G | Refills: 11 | Status: ON HOLD | OUTPATIENT
Start: 2022-01-27 | End: 2023-01-29 | Stop reason: HOSPADM

## 2022-01-27 NOTE — TELEPHONE ENCOUNTER
----- Message from Mary Sher sent at 1/27/2022 11:41 AM CST -----  Contact: [phone]  Requesting an RX refill or new RX.  Is this a refill or new RX: REFILL  RX name and strength (copy/paste from chart):  fluticasone propionate (FLONASE) 50 mcg/actuation nasal spray  Is this a 30 day or 90 day RX: 90  Patient advised that in the future they can use their [facility] account to request a refill?:YES  Patient advised that RX refills can take up to 72 hours?: YES  Pharmacy name and phone # (copy/paste from chart):    [facility]  [address]  [place] [address]  Phone: [phone] Fax: [phone]      Comments: Pt wants rx delivered to her home         Patient would like to get medical advice.  Symptoms (please be specific):  congestion in throat   How long have you had these symptoms: awhile  Would you like a call back, or a response through your [facility] portal?:  call   Pharmacy name and phone # (copy from chart):    [facility]  [address]  [address]  [place] [address]  Phone: [phone] Fax: [phone]       Comments:   pt is asking for rx for cough/ congestion

## 2022-01-27 NOTE — TELEPHONE ENCOUNTER
No new care gaps identified.  Powered by [employer]. Reference number: [id number].   1/27/2022 2:36:24 PM CST

## 2022-01-27 NOTE — TELEPHONE ENCOUNTER
Spoke with pt in depth about each of these issues. Reports for quite some time she has been experiencing dizziness/weakness upon raising her arms above her head and turning her head. Has seen neuro and ENT for this. Encouraged pt to follow up with neuro and/or ENT for these continued symptoms, and to do her HEP from a seated position in a chair for safety.     Pt expressed concerns about the MRI. Explained the process to the patient and she feels ready to proceed as scheduled without sedation.     Pt states understanding and appreciation.     Clara Canela MS, OTC  Clinical Assistant to Dr. Martha Wren

## 2022-01-27 NOTE — TELEPHONE ENCOUNTER
----- Message from Funmi Nava MA sent at 1/27/2022  2:37 PM CST -----  Regarding: FW: pt   Hi this is yalls patient but was sent to [facility].  Anything you want me to do with this?  ----- Message -----  From: Sagrario Nguyen  Sent: 1/27/2022   2:31 PM CST  To: [facility] Staff  Subject: pt                                               Pt is calling to speak with the nurse pt was seen yesterday and was given some home exercises to do pt wanted to let the nurses know pt is having weakness in her legs pt was seen for arm and shoulder pain. Pt said she did the exercises today and was having the leg weakness. Can you please call pt at [phone]. Pt said she is hearing impaired.     LOU

## 2022-01-27 NOTE — TELEPHONE ENCOUNTER
----- Message from Rima Mackey sent at 1/27/2022  2:45 PM CST -----  Regarding: Call back  Contact: [phone]  Who Called: PT     Patient is calling to talk to nurse in regards to her MRI's and if doctor called in medication to help her relax or can she get it done in the hospital with sedation. Please advice

## 2022-01-28 RX ORDER — FLUTICASONE PROPIONATE 50 MCG
SPRAY, SUSPENSION (ML) NASAL
Qty: 32 G | Refills: 11 | OUTPATIENT
Start: 2022-01-28

## 2022-01-29 NOTE — TELEPHONE ENCOUNTER
Quick DC. Request already responded to by other means (e.g. phone or fax)   Refill Authorization Note   Jenniffer Jimenez  is requesting a refill authorization.  Brief Assessment and Rationale for Refill:  Quick Discontinue  Medication Therapy Plan:  quick dc: duplicate     Medication Reconciliation Completed:  No      Comments:   Pended Medication(s)       Requested Prescriptions     Pending Prescriptions Disp Refills    fluticasone propionate (FLONASE) 50 mcg/actuation nasal spray [Pharmacy Med Name: FLUTICASONE PROPIONATE 50 MCG/ACT Suspension] 32 g 11     Sig: USE 1 SPRAY IN EACH NOSTRIL ONE TIME DAILY        Duplicate Pended Encounter(s)/ Last Prescribed Details: (includes pharmacy & prescriber details)   fluticasone propionate (FLONASE) 50 mcg/actuation nasal spray 32 g 11 1/27/2022  No   Sig: USE 1 SPRAY IN EACH NOSTRIL ONE TIME DAILY   Sent to pharmacy as: fluticasone propionate (FLONASE) 50 mcg/actuation nasal spray   Class: Normal   Order: [id number]   Date/Time Signed: 1/27/2022 14:54           Ordering Encounter Report    Associated Reports   View Encounter                Note composed:7:26 PM 01/28/2022

## 2022-02-01 ENCOUNTER — LAB VISIT (OUTPATIENT)
Dept: LAB | Facility: HOSPITAL | Age: 87
End: 2022-02-01
Attending: INTERNAL MEDICINE
Payer: MEDICARE

## 2022-02-01 DIAGNOSIS — I13.0 HYPERTENSIVE HEART AND RENAL DISEASE WITH CONGESTIVE HEART FAILURE: Primary | ICD-10-CM

## 2022-02-01 PROCEDURE — 87077 CULTURE AEROBIC IDENTIFY: CPT | Mod: HCNC | Performed by: INTERNAL MEDICINE

## 2022-02-01 PROCEDURE — 81001 URINALYSIS AUTO W/SCOPE: CPT | Mod: HCNC | Performed by: INTERNAL MEDICINE

## 2022-02-01 PROCEDURE — 87086 URINE CULTURE/COLONY COUNT: CPT | Mod: HCNC | Performed by: INTERNAL MEDICINE

## 2022-02-01 PROCEDURE — 87088 URINE BACTERIA CULTURE: CPT | Mod: HCNC | Performed by: INTERNAL MEDICINE

## 2022-02-01 PROCEDURE — 87186 SC STD MICRODIL/AGAR DIL: CPT | Mod: HCNC | Performed by: INTERNAL MEDICINE

## 2022-02-01 NOTE — PROGRESS NOTES
Rani with [facility] called back and I gave give verbal orders for ua and culture   She VU and will set up apt to go see her

## 2022-02-01 NOTE — PROGRESS NOTES
Called [facility] and spoke to Yokasta about verbal orders. She said she would have Rani call me back

## 2022-02-02 ENCOUNTER — HOSPITAL ENCOUNTER (OUTPATIENT)
Dept: RADIOLOGY | Facility: HOSPITAL | Age: 87
Discharge: HOME | End: 2022-02-02
Attending: NEUROMUSCULOSKELETAL MEDICINE & OMM
Payer: MEDICARE

## 2022-02-02 ENCOUNTER — TELEPHONE (OUTPATIENT)
Dept: INTERNAL MEDICINE | Facility: CLINIC | Age: 87
End: 2022-02-02
Payer: MEDICARE

## 2022-02-02 DIAGNOSIS — M54.2 NECK PAIN: ICD-10-CM

## 2022-02-02 DIAGNOSIS — G89.29 CHRONIC LEFT SHOULDER PAIN: ICD-10-CM

## 2022-02-02 DIAGNOSIS — M25.512 CHRONIC LEFT SHOULDER PAIN: ICD-10-CM

## 2022-02-02 DIAGNOSIS — M47.812 SPONDYLOSIS OF CERVICAL JOINT: ICD-10-CM

## 2022-02-02 LAB
BACTERIA #/AREA URNS AUTO: ABNORMAL /HPF
BILIRUB UR QL STRIP: NEGATIVE
CLARITY UR REFRACT.AUTO: ABNORMAL
COLOR UR AUTO: ABNORMAL
GLUCOSE UR QL STRIP: NEGATIVE
HGB UR QL STRIP: ABNORMAL
HYALINE CASTS UR QL AUTO: 0 /LPF
KETONES UR QL STRIP: NEGATIVE
LEUKOCYTE ESTERASE UR QL STRIP: ABNORMAL
MICROSCOPIC COMMENT: ABNORMAL
NITRITE UR QL STRIP: POSITIVE
PH UR STRIP: 5 [PH] (ref 5–8)
PROT UR QL STRIP: ABNORMAL
RBC #/AREA URNS AUTO: 5 /HPF (ref 0–4)
SP GR UR STRIP: 1.01 (ref 1–1.03)
URN SPEC COLLECT METH UR: ABNORMAL
WBC #/AREA URNS AUTO: >100 /HPF (ref 0–5)
WBC CLUMPS UR QL AUTO: ABNORMAL

## 2022-02-02 PROCEDURE — 73221 MRI JOINT UPR EXTREM W/O DYE: CPT | Mod: TC,HCNC,LT

## 2022-02-02 PROCEDURE — 72141 MRI NECK SPINE W/O DYE: CPT | Mod: TC,HCNC

## 2022-02-02 PROCEDURE — 72141 MRI CERVICAL SPINE WITHOUT CONTRAST: ICD-10-PCS | Mod: 26,HCNC,, | Performed by: RADIOLOGY

## 2022-02-02 PROCEDURE — 73221 MRI JOINT UPR EXTREM W/O DYE: CPT | Mod: 26,HCNC,LT, | Performed by: RADIOLOGY

## 2022-02-02 PROCEDURE — 72141 MRI NECK SPINE W/O DYE: CPT | Mod: 26,HCNC,, | Performed by: RADIOLOGY

## 2022-02-02 PROCEDURE — 73221 MRI SHOULDER WITHOUT CONTRAST LEFT: ICD-10-PCS | Mod: 26,HCNC,LT, | Performed by: RADIOLOGY

## 2022-02-02 NOTE — TELEPHONE ENCOUNTER
Tried calling pt but it asks for a remote access code.  I sent her a portal message letting her know she can take 1 AZO TID for UTI symptoms

## 2022-02-02 NOTE — TELEPHONE ENCOUNTER
----- Message from Kurtis Inman sent at 2/2/2022  2:41 PM CST -----  Contact: Pt @ [phone]  Patient would like to get medical advice.  Symptoms (please be specific):  possible UTI  How long have you had these symptoms: 1 week  Would you like a call back, or a response through your [facility] portal?:   call  Pharmacy name and phone # (copy from chart):    Comments:    Pt would like to know the results of her Urine test.

## 2022-02-02 NOTE — TELEPHONE ENCOUNTER
Tried calling pt but no answer and then it asks for a remote code.  I sent pt a portal message through another phone encounter

## 2022-02-02 NOTE — TELEPHONE ENCOUNTER
----- Message from Thor Flynn sent at 2/2/2022 10:16 AM CST -----  Contact: [phone] pt  Calling to get test results.  Name of test (lab, x-ray): Urine  Date of test: n/a  Where was the test performed: [facility]  Would you like a call back, or a response through your [facility] portal?:  call back   Comments: Pt states she needs an antibiotic for her UTI, pt is in pain

## 2022-02-02 NOTE — TELEPHONE ENCOUNTER
Yes she can take azo 1 tab po tid x 3 days; culture will hopefully return in 1-2 days; if symptoms don't improve with azo, I will send a tentative abx until her culture returns

## 2022-02-03 ENCOUNTER — OFFICE VISIT (OUTPATIENT)
Dept: SPORTS MEDICINE | Facility: CLINIC | Age: 87
End: 2022-02-03
Payer: MEDICARE

## 2022-02-03 ENCOUNTER — TELEPHONE (OUTPATIENT)
Dept: SPORTS MEDICINE | Facility: CLINIC | Age: 87
End: 2022-02-03
Payer: MEDICARE

## 2022-02-03 ENCOUNTER — HOSPITAL ENCOUNTER (OUTPATIENT)
Dept: RADIOLOGY | Facility: HOSPITAL | Age: 87
Discharge: HOME | End: 2022-02-03
Attending: NEUROMUSCULOSKELETAL MEDICINE & OMM
Payer: MEDICARE

## 2022-02-03 ENCOUNTER — PATIENT MESSAGE (OUTPATIENT)
Dept: INTERNAL MEDICINE | Facility: CLINIC | Age: 87
End: 2022-02-03
Payer: MEDICARE

## 2022-02-03 VITALS
DIASTOLIC BLOOD PRESSURE: 70 MMHG | HEIGHT: 65 IN | WEIGHT: 161 LBS | BODY MASS INDEX: 26.82 KG/M2 | SYSTOLIC BLOOD PRESSURE: 120 MMHG

## 2022-02-03 DIAGNOSIS — M25.512 CHRONIC LEFT SHOULDER PAIN: ICD-10-CM

## 2022-02-03 DIAGNOSIS — M67.912 ROTATOR CUFF DYSFUNCTION, LEFT: ICD-10-CM

## 2022-02-03 DIAGNOSIS — M54.2 NECK PAIN: ICD-10-CM

## 2022-02-03 DIAGNOSIS — G89.29 CHRONIC LEFT SHOULDER PAIN: ICD-10-CM

## 2022-02-03 DIAGNOSIS — M48.02 CERVICAL STENOSIS OF SPINE: ICD-10-CM

## 2022-02-03 DIAGNOSIS — M19.012 OSTEOARTHRITIS OF LEFT SHOULDER, UNSPECIFIED OSTEOARTHRITIS TYPE: Primary | ICD-10-CM

## 2022-02-03 DIAGNOSIS — M25.519 PAIN IN UNSPECIFIED SHOULDER: ICD-10-CM

## 2022-02-03 DIAGNOSIS — M47.812 SPONDYLOSIS OF CERVICAL JOINT: ICD-10-CM

## 2022-02-03 PROCEDURE — 3288F PR FALLS RISK ASSESSMENT DOCUMENTED: ICD-10-PCS | Mod: HCNC,CPTII,S$GLB, | Performed by: NEUROMUSCULOSKELETAL MEDICINE & OMM

## 2022-02-03 PROCEDURE — 73221 MRI JOINT UPR EXTREM W/O DYE: CPT | Mod: 26,HCNC,LT,GC | Performed by: RADIOLOGY

## 2022-02-03 PROCEDURE — 1101F PT FALLS ASSESS-DOCD LE1/YR: CPT | Mod: HCNC,CPTII,S$GLB, | Performed by: NEUROMUSCULOSKELETAL MEDICINE & OMM

## 2022-02-03 PROCEDURE — 3288F FALL RISK ASSESSMENT DOCD: CPT | Mod: HCNC,CPTII,S$GLB, | Performed by: NEUROMUSCULOSKELETAL MEDICINE & OMM

## 2022-02-03 PROCEDURE — 73221 MRI SHOULDER WITHOUT CONTRAST LEFT: ICD-10-PCS | Mod: 26,HCNC,LT,GC | Performed by: RADIOLOGY

## 2022-02-03 PROCEDURE — 99214 OFFICE O/P EST MOD 30 MIN: CPT | Mod: HCNC,S$GLB,, | Performed by: NEUROMUSCULOSKELETAL MEDICINE & OMM

## 2022-02-03 PROCEDURE — 99999 PR PBB SHADOW E&M-EST. PATIENT-LVL IV: ICD-10-PCS | Mod: PBBFAC,HCNC,, | Performed by: NEUROMUSCULOSKELETAL MEDICINE & OMM

## 2022-02-03 PROCEDURE — 1159F PR MEDICATION LIST DOCUMENTED IN MEDICAL RECORD: ICD-10-PCS | Mod: HCNC,CPTII,S$GLB, | Performed by: NEUROMUSCULOSKELETAL MEDICINE & OMM

## 2022-02-03 PROCEDURE — 73221 MRI JOINT UPR EXTREM W/O DYE: CPT | Mod: TC,HCNC,LT

## 2022-02-03 PROCEDURE — 99214 PR OFFICE/OUTPT VISIT, EST, LEVL IV, 30-39 MIN: ICD-10-PCS | Mod: HCNC,S$GLB,, | Performed by: NEUROMUSCULOSKELETAL MEDICINE & OMM

## 2022-02-03 PROCEDURE — 1101F PR PT FALLS ASSESS DOC 0-1 FALLS W/OUT INJ PAST YR: ICD-10-PCS | Mod: HCNC,CPTII,S$GLB, | Performed by: NEUROMUSCULOSKELETAL MEDICINE & OMM

## 2022-02-03 PROCEDURE — 1160F RVW MEDS BY RX/DR IN RCRD: CPT | Mod: HCNC,CPTII,S$GLB, | Performed by: NEUROMUSCULOSKELETAL MEDICINE & OMM

## 2022-02-03 PROCEDURE — 1160F PR REVIEW ALL MEDS BY PRESCRIBER/CLIN PHARMACIST DOCUMENTED: ICD-10-PCS | Mod: HCNC,CPTII,S$GLB, | Performed by: NEUROMUSCULOSKELETAL MEDICINE & OMM

## 2022-02-03 PROCEDURE — 1159F MED LIST DOCD IN RCRD: CPT | Mod: HCNC,CPTII,S$GLB, | Performed by: NEUROMUSCULOSKELETAL MEDICINE & OMM

## 2022-02-03 PROCEDURE — 99999 PR PBB SHADOW E&M-EST. PATIENT-LVL IV: CPT | Mod: PBBFAC,HCNC,, | Performed by: NEUROMUSCULOSKELETAL MEDICINE & OMM

## 2022-02-03 RX ORDER — NITROFURANTOIN 25; 75 MG/1; MG/1
100 CAPSULE ORAL 2 TIMES DAILY
Qty: 10 CAPSULE | Refills: 0 | Status: SHIPPED | OUTPATIENT
Start: 2022-02-03 | End: 2022-03-02 | Stop reason: SDUPTHER

## 2022-02-03 NOTE — TELEPHONE ENCOUNTER
Please keep an eye on urine culture final hopefully will be back this afternoon so you  can order something.    Thanks remberto

## 2022-02-03 NOTE — PROGRESS NOTES
Subjective:     Jenniffer Jimenez     Chief Complaint   Patient presents with    Left Shoulder - Pain    Neck - Pain    Follow-up       HPI      Jenniffer is a 89 y.o. female coming in today for left shoulder, neck, and arm pain.  Since last visit the pain has Improved. Pt reports more ROM in her arm and less pain, though still very limited with flexion or raising her arm.  She notes her home OT has been helping her significantly, requests to continue this. The pain is better with rest, tylenol and worse with ROM, raising arm. Pt notes she is unable to actively raise her arm. Pt. describes the pain as a 5/10 achy pain that does not radiate. There has not been any new a fall/injury/ or traumas since last visit.  Pt. denies any new musculoskeletal complaints at this time.  Patient is here to review her results of her recent cervical spine MRI.  Of note, a left shoulder MRI was also ordered however there was an error in the sequencing, so this is needed to be repeated.    Office note from 1/26/22 reviewed    Joint instability? no  Mechanical locking/clicking? no   Affecting ADL's? yes  Affecting sleep? yes    Occupation: retired     Review of Systems   Constitutional: Negative for chills and fever.   Musculoskeletal: Positive for joint pain. Negative for back pain, falls, myalgias and neck pain.   Neurological: Positive for weakness. Negative for dizziness, tingling, focal weakness and headaches.       PAST MEDICAL HISTORY:   Past Medical History:   Diagnosis Date    Amblyopia of left eye 4/10/2013    Anxiety     Arthritis     Facet arthropathy, Lumbosacral    Cataract     Central retinal vein occlusion of left eye     Depression     Diabetic polyneuropathy 1/6/2022    Exotropia of both eyes 2/6/2013    recession RSR 5.0mm w/ adj; recession LR os 5.0 w/ adj; resect MR os  4.0mm    Hearing loss     History of resection of small bowel     Hypertensive retinopathy of both eyes     Hypoglycemia     Macular  degeneration     OA (osteoarthritis) of shoulder     Right    Osteoporosis     Posterior vitreous detachment of both eyes     Rhinitis     TIA (transient ischemic attack)      PAST SURGICAL HISTORY:   Past Surgical History:   Procedure Laterality Date    APPENDECTOMY      CARDIAC CATHETERIZATION      CATARACT EXTRACTION W/  INTRAOCULAR LENS IMPLANT Bilateral      SECTION, CLASSIC      CLOSURE OF LEFT ATRIAL APPENDAGE USING DEVICE N/A 2020    Procedure: Left atrial appendage closure device;  Surgeon: Abundio Curtis MD;  Location: [facility] CATH LAB;  Service: Cardiology;  Laterality: N/A;    HYSTERECTOMY      INNER EAR SURGERY      JOINT REPLACEMENT      LEFT KNEE REPLACEMENT IN  -    OOPHORECTOMY      SINUS SURGERY      STRABISMUS SURGERY  13    RSR recession 5 mm, LLR recession 5 mm and LMR resection 4mm    STRABISMUS SURGERY  2014    recess LR OD 6mm    TONSILLECTOMY      watchman surgery N/A 2020     FAMILY HISTORY:   Family History   Problem Relation Age of Onset    Hypertension Mother     Hypertension Father     Liver disease Sister     Diabetes Sister     Heart disease Sister     Diabetes Brother     Breast cancer Maternal Aunt     No Known Problems Maternal Uncle     No Known Problems Paternal Aunt     No Known Problems Paternal Uncle     No Known Problems Maternal Grandmother     No Known Problems Maternal Grandfather     No Known Problems Paternal Grandmother     No Known Problems Paternal Grandfather     No Known Problems Daughter     No Known Problems Son     Heart disease Sister     No Known Problems Son     No Known Problems Son     Cancer Neg Hx         in first degree relatives    Melanoma Neg Hx     Psoriasis Neg Hx     Lupus Neg Hx     Amblyopia Neg Hx     Blindness Neg Hx     Cataracts Neg Hx     Glaucoma Neg Hx     Macular degeneration Neg Hx     Retinal detachment Neg Hx     Strabismus Neg Hx     Stroke Neg Hx      Thyroid disease Neg Hx      SOCIAL HISTORY:   Social History     Socioeconomic History    Marital status:    Occupational History    Occupation: retired   Tobacco Use    Smoking status: Former Smoker     Types: Cigarettes     Quit date: 10/29/1982     Years since quittin.2    Smokeless tobacco: Never Used   Substance and Sexual Activity    Alcohol use: Not Currently     Comment: socially    Drug use: No    Sexual activity: Never   Other Topics Concern    Are you pregnant or think you may be? No    Breast-feeding No     Social Determinants of Health     Financial Resource Strain: Low Risk     Difficulty of Paying Living Expenses: Not hard at all   Food Insecurity: No Food Insecurity    Worried About Running Out of Food in the Last Year: Never true    Ran Out of Food in the Last Year: Never true   Transportation Needs: No Transportation Needs    Lack of Transportation (Medical): No    Lack of Transportation (Non-Medical): No   Physical Activity: Inactive    Days of Exercise per Week: 0 days    Minutes of Exercise per Session: 0 min   Stress: Stress Concern Present    Feeling of Stress : To some extent   Social Connections: Socially Isolated    Frequency of Communication with Friends and Family: Once a week    Frequency of Social Gatherings with Friends and Family: Once a week    Attends [employer] Services: Never    Active Member of Clubs or Organizations: No    Attends Club or Organization Meetings: Never    Marital Status:    Housing Stability: Unknown    Unable to Pay for Housing in the Last Year: No    Unstable Housing in the Last Year: No       MEDICATIONS:   Current Outpatient Medications:     aspirin (ECOTRIN) 81 MG EC tablet, Take 1 tablet (81 mg total) by mouth once daily., Disp: 360 tablet, Rfl: 0    cholecalciferol, vitamin D3, (VITAMIN D3 ORAL), Take 1 tablet by mouth once daily., Disp: , Rfl:     diclofenac sodium (VOLTAREN) 1 % Gel, Apply 2 g topically 4  "(four) times daily. Use gloves to apply for 10 days, Disp: 100 g, Rfl: 1    fluticasone propionate (FLONASE) 50 mcg/actuation nasal spray, USE 1 SPRAY IN EACH NOSTRIL ONE TIME DAILY, Disp: 32 g, Rfl: 11    FLUZONE HIGHDOSE QUAD 21-22  mcg/0.7 mL Syrg, , Disp: , Rfl:     furosemide (LASIX) 20 MG tablet, Take 1 tablet (20 mg total) by mouth 2 (two) times daily before meals. (Patient taking differently: Take 20 mg by mouth once daily. And as needed), Disp: 180 tablet, Rfl: 3    multivitamin (THERAGRAN) per tablet, Take 1 tablet by mouth once daily., Disp: , Rfl:     nitrofurantoin, macrocrystal-monohydrate, (MACROBID) 100 MG capsule, Take 1 capsule (100 mg total) by mouth 2 (two) times daily., Disp: 10 capsule, Rfl: 0    pravastatin (PRAVACHOL) 40 MG tablet, Take 1 tablet (40 mg total) by mouth once daily., Disp: 90 tablet, Rfl: 3    psyllium 0.52 gram capsule, Take 9 g by mouth once daily. Pt takes 3 capsules 3 times a day, Disp: , Rfl:     spironolactone (ALDACTONE) 25 MG tablet, Take 1 tablet (25 mg total) by mouth 2 (two) times daily before meals., Disp: 180 tablet, Rfl: 3    vit C/E/Zn/coppr/lutein/zeaxan (OCUVITE LUTEIN AND ZEAXANTHIN ORAL), Take 1 capsule by mouth once daily. Lutien 25 mg, Zeaxanthin 5 mg, Disp: , Rfl:     vitamin E 400 UNIT capsule, Take 400 Units by mouth once daily., Disp: , Rfl:   ALLERGIES:   Review of patient's allergies indicates:   Allergen Reactions    Opioids - morphine analogues Other (See Comments)     Bowel issues; bowel obstruction    Tizanidine Other (See Comments)     "Lips were numb,  Almost passed out."    Tramadol Hallucinations    Beta-blockers (beta-adrenergic blocking agts) Other (See Comments)     Can not go on beta blockers for long period of time - due to taking allergy injections    Morphine     Opioids-meperidine and related        Reviewed office visit on 12/21/21 with Hao Aguila PA-C: L shoulder posttraumatic osteoarthritis. CSI L " "shoulder    Reviewed office visit on 22 with Dr. Perez: Patient is living alone and has some problems taking care of herself because of her balance and severe limitations with her left shoulder.  She had a prior fracture left shoulder in I believe  and began having pain in mid December resulting in a ER visit and a shot by an orthopedist which have not given her much relief.  She was told that they thought it was bursitis.  She has very little movement of the left shoulder without severe pain.    IMAGIN. X-ray ordered due to left shoulder pain. (AP, scapular Y, and axillary views) taken 21, 21.   2. X-ray images were reviewed personally by me and then directly with patient.  3. FINDINGS: Generalized osteopenia.  Remote ORIF for healed fracture of the humeral shaft with long intramedullary krysten and single proximal anchoring screw, without evidence of acute hardware malalignment or loosening. There is stable appearance of long intramedullary krysten fixation with proximal stabilizing screw.  No periprosthetic fractures identified No acute displaced fracture, dislocation or destructive osseous process.  Minimal to mild degenerative change about the shoulder. There is narrowing of the glenohumeral joint.  There is mild arthropathy of the acromioclavicular joint.  The coracoclavicular interval is within normal limits. There are calcifications of the thoracic aorta.  The remaining visualized left hemithorax is within normal limits. No subcutaneous emphysema or radiodense retained foreign body.  No left apical pneumothorax.  4. IMPRESSION: Left humerus remote ORIF without detrimental change or acute osseous process seen. No evidence of a fracture or dislocation of the left shoulder.  Stable postop changes of the left shoulder.    Objective:     VITAL SIGNS: /70   Ht 5' 5" (1.651 m)   Wt 73 kg (161 lb)   LMP  (LMP Unknown)   BMI 26.79 kg/m²    General    Vitals reviewed.  Constitutional: She " is oriented to person, place, and time. She appears well-developed and well-nourished.   Neurological: She is alert and oriented to person, place, and time.   Psychiatric: She has a normal mood and affect. Her behavior is normal.             MUSCULOSKELETAL EXAM  Shoulder: left SHOULDER  The affected shoulder is compared to the contralateral shoulder.    Observation:      SHOULDER  No ecchymosis, edema, or erythema throughout the shoulder girdle.  No sternal, clavicular, or acromial deformities bilaterally.  + atrophy of the pectorals, biceps, deltoids, supraspinatus and infraspinatus on the left  + asymmetry of shoulder on left, but decreased left shoulder elevation     Posture:  Increased thoracic kyphosis and Anterior head carriage      ROM (* = with pain):  CERVICAL SPINE   AROM in flexion, extension, sidebending, and rotation.    SHOULDER  Active flexion to 10° on left* (passove to 100 degrees) and 180° on right.  Active abduction to 10° on left* (passive to 100 degrees) and 180° on right.  Active internal rotation to SI on left and T7 on right.    Active external rotation unable to perform on left and T4 on right.    No scapular dyskinesia or winging.     Tenderness:  No tenderness at the SC or AC joint  No tenderness over the clavicle   No tenderness over biceps tendon or bicipital groove  + tenderness over subacromial space  + left upper trapezius pain    Strength Testing (* = with pain):  Deltoid - 3+/5 on left and 5/5 on right  Biceps - 3+/5 on left and 5/5 on right  Triceps - 5/5 on left and 5/5 on right  Wrist extension - 5/5 on left and 5/5 on right  Wrist flexion - 5/5 on left and 5/5 on right   - 5/5 on left and 5/5 on right  Finger extension - 5/5 on left and 5/5 on right  Finger abduction - 5/5 on left and 5/5 on right    Special Tests:  Empty can test - positive  Full can test - positive  Bear hug test - positive  Belly press test - positive  Resisted internal rotation -  positive  Resisted external rotation - positive  + drop arm test    Neer's test - unable to perform due to degrees ROM  Hawkin's-Sanjay test - positive  Cross-arm test- negative    Sulcus sign - none  AP load and shift laxity - none    Speed's test - negative  Yergason's test - negative    Neurovascular Exam:  Spurlings test - positive on left, reproducing left shoulder and upper arm pain  Lhermittes test - positive  2+ radial pulses bilaterally  Sensation intact to light touch in the distal median, radial, and ulnar nerve distributions bilaterally.  Negative Tinel's at carpal tunnel  Negative Tinel's at cubital tunnel  2+/4 reflexes at triceps, biceps, and brachioradialis  Capillary refill intact <2 seconds in all digits bilaterally      IMAGIN. MRI ordered due to cervical spine pain taken 22.   2. MRI images were reviewed personally by me and then directly with patient.  3. FINDINGS:   Alignment: Slight grade 1 retrolisthesis of C3 on C4.  Vertebrae: No acute fracture.  No diffuse marrow replacement process.  Slight increased STIR signal in the T7 vertebral body, likely degenerative.  Discs: Multilevel disc desiccation with severe disc height loss at C3-C4.  Cord: Normal caliber and signal.  Skull base and craniocervical junction: Normal.  Degenerative findings:  C2-C3: Facet hypertrophy and uncovertebral spurring, resulting in mild right and moderate left neural foraminal narrowing.  No spinal canal stenosis.  C3-C4: Posterior disc osteophyte complex, facet hypertrophy, and uncovertebral spurring resulting in moderate spinal canal stenosis and severe bilateral neural foraminal narrowing.  C4-C5: Prominent posterior disc osteophyte complex with superimposed small right paracentral protrusion.  Facet hypertrophy and uncovertebral spurring noted.  Findings result in severe spinal canal stenosis and severe bilateral neural foraminal narrowing.  C5-C6: Posterior disc osteophyte complex, facet hypertrophy,  and uncovertebral spurring, resulting in moderate spinal canal stenosis and severe bilateral neural foraminal narrowing.  C6-C7: Posterior disc osteophyte complex, facet hypertrophy, and uncovertebral spurring, resulting in mild spinal canal stenosis as well as moderate bilateral neural foraminal narrowing.  C7-T1: Facet hypertrophy contributing to mild right neural foraminal narrowing.  No spinal canal stenosis.   Paraspinal muscles & soft tissues: Unremarkable.  4. IMPRESSION:  Multilevel cervical spondylosis, resulting in moderate/ severe spinal canal stenosis from C3-C4 to C5-C6 and moderate/ severe neural foraminal narrowing from C2-C3 to C6-C7, as above.      Assessment:      Encounter Diagnoses   Name Primary?    Osteoarthritis of left shoulder, unspecified osteoarthritis type Yes    Spondylosis of cervical joint     Chronic left shoulder pain     Rotator cuff dysfunction, left     Neck pain           Plan:   1. Chronic left shoulder pain with suspicion of underlying left rotator cuff tear on physical exam today as well as underlying DJD changes on previous shoulder x-rays.  Patient also has underlying neck pain, spondylosis, and spinal stenosis on recent cervical spine MRI  that is likely contributing to her left shoulder pain as well.   Patient has seen Dr. Miranda (pain management) for her neck pain in the past - referral place for a follow-up with him. Only temporary relief with previous left shoulder CSI performed buy Hao Aguila PA-C on 12/21/21.  - Pt. returning to imaging center following this appointment to obtain left shoulder MRI with proper sequencing.  I plan on calling patient to review her results once these are obtained.    - recommend continuing home health PT/OT (reordred today) as tolerated for left shoulder passive range of motion exercises as well as balance retraining.   - recommend continuing over-the-counter Tylenol, ice or heat up to 20 minutes at a time, and OTC Voltaren 1%  gel to 4 times a day  -  X-ray images of left shoulder taken 12/23/21 (AP, scapular Y, and axillary views) showed DJD changes at the glenohumeral and AC joints and stable appearance of long intramedullary krysten fixation with proximal stabilizing screw of the left humerus. Images were personally reviewed with patient.  -  X-ray images of cervical spine taken 1/26/22 (AP, lateral, bilateral obliques, and odontoid  views) showed  cervical spondylosis with neural foraminal narrowing at C5-C6 and C6-C7. Images were personally reviewed with patient.  - MRI  images of cervical spine taken 2/2/22 showed Multilevel cervical spondylosis, resulting in moderate/ severe spinal canal stenosis from C3-C4 to C5-C6 and moderate/ severe neural foraminal narrowing from C2-C3 to C6-C7, as above.Images were personally reviewed with patient.     2. Reviewed with pt. the following HEP:  Continue:  A) Pendulum exercises: move dangling arm in small circles clockwise for 10 reps and counterclockwise for 10 reps, 1-2 times daily   B) passive shoulder ABduction and flexion ROM motion exercises: 10 reps, 1-2 times a day     The patient was taught a homegoing physical therapy regimen as described above. The patient demonstrated understanding of the exercises and proper technique of their execution.     3. Follow-up with pain management for further evaluation of neck pain. Plan to call patient with results of upcoming left shoulder MRI.     4. Patient agreeable to today's plan and all questions were answered    This note is dictated using the M*Modal Fluency Direct word recognition program. There are word recognition mistakes that are occasionally missed on review.

## 2022-02-03 NOTE — TELEPHONE ENCOUNTER
----- Message from RT Momo sent at 2/3/2022 11:46 AM CST -----  Regarding: RE: Per Dr Chery Study wants pt repeated at No charge to patient  Okay sure thing will work her in  But still We will a new order for MRI Shoulder left wo   We can't use the order from yesterday  Please thank you  ----- Message -----  From: Clara Canela  Sent: 2/3/2022  11:43 AM CST  To: RT Momo, Martha Wren DO, #  Subject: RE: Per Dr Chery Study wants pt repeated at#    Good morning,  The patient is on her way to the imaging center now. I discussed with the read room and techs and they said they could work her in to repeat the study.     Thanks!  Clara Canela, MS, OTC  Clinical Assistant to Dr. Martha Wren      ----- Message -----  From: RT Momo  Sent: 2/3/2022  11:41 AM CST  To: Martha Wren DO, [facility] Staff  Subject: Per Dr Chery Study wants pt repeated at No #      Good Morning    I am writing in regards to our patient Jenniffer Jimenez patient came on 2/2/22 for her MRI Shoulder  Per Dr Chery wants patient to repeat MRI shoulder with Thorp Protocol because patient has  Metal. Can you assist us with an MRI Shoulder Left WO contrast.  Please advise while patient is here at [facility]  Thank you

## 2022-02-04 ENCOUNTER — PATIENT MESSAGE (OUTPATIENT)
Dept: INTERNAL MEDICINE | Facility: CLINIC | Age: 87
End: 2022-02-04
Payer: MEDICARE

## 2022-02-04 LAB — BACTERIA UR CULT: ABNORMAL

## 2022-02-04 NOTE — PROGRESS NOTES
Called pt. To discussed recent left shoulder MRI results. Plan to follow-up with pain management for neck pain first, as I believe this is the main source of patient's pain. If shoulder pain still present following treatment with Dr. Miranda, consider US guided subacromial CSI for associated bursitis.

## 2022-02-07 ENCOUNTER — CLINICAL SUPPORT (OUTPATIENT)
Dept: INTERNAL MEDICINE | Facility: CLINIC | Age: 87
End: 2022-02-07
Payer: MEDICARE

## 2022-02-07 DIAGNOSIS — J30.1 ALLERGIC RHINITIS DUE TO POLLEN, UNSPECIFIED SEASONALITY: Primary | ICD-10-CM

## 2022-02-07 DIAGNOSIS — Z91.09 ALLERGY TO MITES: ICD-10-CM

## 2022-02-07 DIAGNOSIS — Z91.048 ALLERGY TO MOLD: ICD-10-CM

## 2022-02-07 PROCEDURE — 99499 UNLISTED E&M SERVICE: CPT | Mod: HCNC,S$GLB,, | Performed by: ALLERGY & IMMUNOLOGY

## 2022-02-07 PROCEDURE — 95115 IMMUNOTHERAPY ONE INJECTION: CPT | Mod: HCNC,S$GLB,, | Performed by: FAMILY MEDICINE

## 2022-02-07 PROCEDURE — 95115 PR IMMUNOTHERAPY, ONE INJECTION: ICD-10-PCS | Mod: HCNC,S$GLB,, | Performed by: FAMILY MEDICINE

## 2022-02-07 PROCEDURE — 99499 NO LOS: ICD-10-PCS | Mod: HCNC,S$GLB,, | Performed by: ALLERGY & IMMUNOLOGY

## 2022-02-07 NOTE — PROGRESS NOTES
SQ-RT UPPER ARM  allergy injection red vial # 1/1 DM/M/ 0.5 ml given,tolerated well. Pt. Waited 30 minutes, no local reaction noted

## 2022-02-09 ENCOUNTER — DOCUMENT SCAN (OUTPATIENT)
Dept: HOME HEALTH SERVICES | Facility: HOSPITAL | Age: 87
End: 2022-02-09
Payer: MEDICARE

## 2022-02-11 ENCOUNTER — PATIENT MESSAGE (OUTPATIENT)
Dept: INTERNAL MEDICINE | Facility: CLINIC | Age: 87
End: 2022-02-11
Payer: MEDICARE

## 2022-02-14 ENCOUNTER — TELEPHONE (OUTPATIENT)
Dept: PAIN MEDICINE | Facility: CLINIC | Age: 87
End: 2022-02-14
Payer: MEDICARE

## 2022-02-14 ENCOUNTER — TELEPHONE (OUTPATIENT)
Dept: SPORTS MEDICINE | Facility: CLINIC | Age: 87
End: 2022-02-14
Payer: MEDICARE

## 2022-02-14 NOTE — TELEPHONE ENCOUNTER
----- Message from Katie Galarza sent at 2/14/2022  1:25 PM CST -----  Regarding: pt advice  Contact: pt @ [phone]  Pt calling to speak with someone (Clara) in Dr. Wren's office regarding her scheduled appt with Dr. Bright. Please call.

## 2022-02-14 NOTE — TELEPHONE ENCOUNTER
Advised pt that is is ok for her to see Dr. Bright. Confirmed appt date, time and location and patient states understanding.     Clara Canela MS, OTC  Clinical Assistant to Dr. Martha Wren

## 2022-02-14 NOTE — TELEPHONE ENCOUNTER
Spoke with pt, I notified her that we need to reschedule her 2/17 2pm appt with Dr. Miranda. I offered patient a 10am with Dr. Mckeon and a 1:30pm with Dr. Barfield for Friday. Pt scheduled for Friday with Dr. Barfield, pt stated that she depend on transportation to bring her to her appts and she will let us know if she can make it. I offered pt a 2pm appt in [place] with Dr. Westfall on 2/17 since she already have a ride scheduled. Pt accepted appt, pt stated to keep her scheduled for Friday also just incase she can't make it on 2/17, I stated ok. Pt stated that she will call to let us know which one to cancel.

## 2022-02-16 ENCOUNTER — PATIENT MESSAGE (OUTPATIENT)
Dept: SPORTS MEDICINE | Facility: CLINIC | Age: 87
End: 2022-02-16
Payer: MEDICARE

## 2022-02-17 ENCOUNTER — OFFICE VISIT (OUTPATIENT)
Dept: PAIN MEDICINE | Facility: CLINIC | Age: 87
End: 2022-02-17
Payer: MEDICARE

## 2022-02-17 ENCOUNTER — DOCUMENT SCAN (OUTPATIENT)
Dept: HOME HEALTH SERVICES | Facility: HOSPITAL | Age: 87
End: 2022-02-17
Payer: MEDICARE

## 2022-02-17 VITALS
HEIGHT: 65 IN | TEMPERATURE: 98 F | BODY MASS INDEX: 28.16 KG/M2 | WEIGHT: 169 LBS | DIASTOLIC BLOOD PRESSURE: 60 MMHG | HEART RATE: 61 BPM | SYSTOLIC BLOOD PRESSURE: 118 MMHG | RESPIRATION RATE: 19 BRPM

## 2022-02-17 DIAGNOSIS — M62.81 MUSCLE WEAKNESS OF LEFT UPPER EXTREMITY: Primary | ICD-10-CM

## 2022-02-17 DIAGNOSIS — M62.58 MUSCLE ATROPHY OF UPPER EXTREMITY: ICD-10-CM

## 2022-02-17 DIAGNOSIS — M79.10 MYALGIA: ICD-10-CM

## 2022-02-17 DIAGNOSIS — M47.812 CERVICAL SPONDYLOSIS: ICD-10-CM

## 2022-02-17 DIAGNOSIS — M48.02 CERVICAL STENOSIS OF SPINE: ICD-10-CM

## 2022-02-17 DIAGNOSIS — M50.30 DDD (DEGENERATIVE DISC DISEASE), CERVICAL: ICD-10-CM

## 2022-02-17 DIAGNOSIS — M75.102 TEAR OF LEFT SUPRASPINATUS TENDON: Primary | ICD-10-CM

## 2022-02-17 DIAGNOSIS — M62.512 ATROPHY OF MUSCLE OF LEFT SHOULDER: ICD-10-CM

## 2022-02-17 DIAGNOSIS — M47.812 SPONDYLOSIS OF CERVICAL JOINT: ICD-10-CM

## 2022-02-17 DIAGNOSIS — M54.12 CERVICAL RADICULOPATHY: ICD-10-CM

## 2022-02-17 PROCEDURE — 1101F PR PT FALLS ASSESS DOC 0-1 FALLS W/OUT INJ PAST YR: ICD-10-PCS | Mod: HCNC,CPTII,S$GLB, | Performed by: STUDENT IN AN ORGANIZED HEALTH CARE EDUCATION/TRAINING PROGRAM

## 2022-02-17 PROCEDURE — 99205 OFFICE O/P NEW HI 60 MIN: CPT | Mod: HCNC,S$GLB,, | Performed by: STUDENT IN AN ORGANIZED HEALTH CARE EDUCATION/TRAINING PROGRAM

## 2022-02-17 PROCEDURE — 1160F RVW MEDS BY RX/DR IN RCRD: CPT | Mod: HCNC,CPTII,S$GLB, | Performed by: STUDENT IN AN ORGANIZED HEALTH CARE EDUCATION/TRAINING PROGRAM

## 2022-02-17 PROCEDURE — 1159F PR MEDICATION LIST DOCUMENTED IN MEDICAL RECORD: ICD-10-PCS | Mod: HCNC,CPTII,S$GLB, | Performed by: STUDENT IN AN ORGANIZED HEALTH CARE EDUCATION/TRAINING PROGRAM

## 2022-02-17 PROCEDURE — 1160F PR REVIEW ALL MEDS BY PRESCRIBER/CLIN PHARMACIST DOCUMENTED: ICD-10-PCS | Mod: HCNC,CPTII,S$GLB, | Performed by: STUDENT IN AN ORGANIZED HEALTH CARE EDUCATION/TRAINING PROGRAM

## 2022-02-17 PROCEDURE — 1159F MED LIST DOCD IN RCRD: CPT | Mod: HCNC,CPTII,S$GLB, | Performed by: STUDENT IN AN ORGANIZED HEALTH CARE EDUCATION/TRAINING PROGRAM

## 2022-02-17 PROCEDURE — 1101F PT FALLS ASSESS-DOCD LE1/YR: CPT | Mod: HCNC,CPTII,S$GLB, | Performed by: STUDENT IN AN ORGANIZED HEALTH CARE EDUCATION/TRAINING PROGRAM

## 2022-02-17 PROCEDURE — 3288F PR FALLS RISK ASSESSMENT DOCUMENTED: ICD-10-PCS | Mod: HCNC,CPTII,S$GLB, | Performed by: STUDENT IN AN ORGANIZED HEALTH CARE EDUCATION/TRAINING PROGRAM

## 2022-02-17 PROCEDURE — 99999 PR PBB SHADOW E&M-EST. PATIENT-LVL V: CPT | Mod: PBBFAC,HCNC,, | Performed by: STUDENT IN AN ORGANIZED HEALTH CARE EDUCATION/TRAINING PROGRAM

## 2022-02-17 PROCEDURE — 1125F PR PAIN SEVERITY QUANTIFIED, PAIN PRESENT: ICD-10-PCS | Mod: HCNC,CPTII,S$GLB, | Performed by: STUDENT IN AN ORGANIZED HEALTH CARE EDUCATION/TRAINING PROGRAM

## 2022-02-17 PROCEDURE — 1125F AMNT PAIN NOTED PAIN PRSNT: CPT | Mod: HCNC,CPTII,S$GLB, | Performed by: STUDENT IN AN ORGANIZED HEALTH CARE EDUCATION/TRAINING PROGRAM

## 2022-02-17 PROCEDURE — 3288F FALL RISK ASSESSMENT DOCD: CPT | Mod: HCNC,CPTII,S$GLB, | Performed by: STUDENT IN AN ORGANIZED HEALTH CARE EDUCATION/TRAINING PROGRAM

## 2022-02-17 PROCEDURE — 99205 PR OFFICE/OUTPT VISIT, NEW, LEVL V, 60-74 MIN: ICD-10-PCS | Mod: HCNC,S$GLB,, | Performed by: STUDENT IN AN ORGANIZED HEALTH CARE EDUCATION/TRAINING PROGRAM

## 2022-02-17 PROCEDURE — 99999 PR PBB SHADOW E&M-EST. PATIENT-LVL V: ICD-10-PCS | Mod: PBBFAC,HCNC,, | Performed by: STUDENT IN AN ORGANIZED HEALTH CARE EDUCATION/TRAINING PROGRAM

## 2022-02-17 NOTE — PROGRESS NOTES
Chronic Pain - New Consult    Referring Physician: Mag Crowellerral    Date: 02/17/2022     Re: Jenniffer Jimenez  MR#: [id number]  YOB: 1932  Age: 89 y.o.    Chief Complaint:   Chief Complaint   Patient presents with    Neck Pain    Leg Pain     Right          **This note is dictated using the M*Modal Fluency Direct word recognition program. There are word recognition mistakes that are occasionally missed on review.**    ASSESSMENT: 89 y.o. year old female with left neck/shoulder pain, consistent with     1. Tear of left supraspinatus tendon  Ambulatory referral/consult to Physical/Occupational Therapy   2. Cervical radiculopathy     3. Cervical stenosis of spine     4. DDD (degenerative disc disease), cervical     5. Cervical spondylosis     6. Atrophy of muscle of left shoulder     7. Myalgia           PLAN:     Cervical radiculopathy  -Noted stenosis at multiple levels on MRI  -May be contributing to weakness, but she has no pain anymore so cervical epidural would not be indicated  -continue conservative therapy  -discussed addition of Gabapentin, but after discussing SEs, the patient declined.  -If radicular pain returns, then would be a candidate for cervical epidural.    Left arm weakness  -2/2 partial rotator cuff tear vs cervical radic  -discussed spine surgery as option given the degree of stenosis, however, patient declines referral.   -Discussed with patient that an injection purely for weakness would not be indicated, as it is unlikely to give improvement.     Left Suprascapular tear   -Noted on shoulder MRI  -following with sports med  -ROM/strengthe is slowly improving per patient.   -Continue OT.  New referral to outpatient OT provided.    Myalgia  -the patient has a trigger point at the left trap. We discussed a trigger point injection, but she defers for now.    Lower extremity weakness with arms raised  -may be related to cervical stenosis.  Discussed that only surgery could  relieve the pressure on the cervical spine to potentially improve these symptoms. Monitor.     - RTC PRN  - Counseled patient regarding the importance of  activity modification.    The above plan and management options were discussed at length with patient. Patient is in agreement with the above and verbalized understanding. It will be communicated with the referring physician via electronic record, fax, or mail.  Lab/study reports reviewed were important and necessary because subsequent medical and treatment recommendations required review of the above lab/study reports. Images viewed/reviewed above were important and necessary because subsequent medical and treatment recommendations required review of the reviewed image(s).     Electronically signed by:  Lionel Reeves  02/17/2022    =========================================================================================================    SUBJECTIVE:    Jenniffer Jimenez is a 89 y.o. female presents to the clinic for the evaluation of shoulder and neck pain. The pain started December 10 2021 following lifting a bag of dog food and symptoms have been improving.  She was previosuly having singificant pain in the left neck/trap/shoulder with radiation down the left arm to the mid biceps.  She also has associated weakness of the left arm which is new as she is able to abduct her shoulder or flex beyond 30 degrees.  This is new since the incident.  Over the last week the patient reports that her pain has mostly resolved.  She is not taking pain medication anymore (just Tylenol), and has only 0-1/10 pain.  The patient states that she does have some weakness in the lower extremities when she raises her arms above her head, but this has been going on for longer than the pain.  She does not have b/b changes.  ROM is not painful, as she can do it passively, but cannot acitvely move her shoulder very much.  She is currently getting OT through home health.    Pain  Description:    The pain is located in the left neck area and radiates to the left trap and shoulder.    At BEST  0/10   At WORST  9/10 on the WORST day.    On average pain is rated as 1/10.   Today the pain is rated as 1/10  The pain is continuous.  The pain is described as aching    Symptoms interfere with daily activity.   Exacerbating factors: moving her head    Mitigating factors nothing.     Physical Therapy/Home Exercise: Yes, currently in Physical therapy    Current Pain Medications:    - Tylenol 500mg QID PRN    Failed Pain Medications:    - Tylenol    Pain Treatment Therapies:    Pain procedures: none  Physical Therapy: currently in occupational therapy for the shoulder    Patient denies urinary incontinence and bowel incontinence.  Patient denies any suicidal or homicidal ideations     report:  Not applicable    Imaging:   Cervical MRI 2/2/22:    Multilevel cervical spondylosis, resulting in moderate/ severe spinal canal stenosis from C3-C4 to C5-C6 and moderate/ severe neural foraminal narrowing from C2-C3 to C6-C7, as above.    Shoulder MRI  1. Tendinosis and possible partial thickness tear involving the insertional supraspinatus.  Infraspinatus tendinosis.  No evidence for full-thickness rotator cuff tear.  Mild to moderate muscle atrophy.  2. Moderate acromioclavicular arthrosis.  3. Low signal material in the expected location of the subacromial subdeltoid bursa, likely complex bursitis.  Appearance may be seen with calcific or gouty bursitis.    Past Medical History:   Diagnosis Date    Amblyopia of left eye 4/10/2013    Anxiety     Arthritis     Facet arthropathy, Lumbosacral    Cataract     Central retinal vein occlusion of left eye     Depression     Diabetic polyneuropathy 1/6/2022    Exotropia of both eyes 2/6/2013    recession RSR 5.0mm w/ adj; recession LR os 5.0 w/ adj; resect MR os  4.0mm    Hearing loss     History of resection of small bowel     Hypertensive retinopathy of  both eyes     Hypoglycemia     Macular degeneration     OA (osteoarthritis) of shoulder     Right    Osteoporosis     Posterior vitreous detachment of both eyes     Rhinitis     TIA (transient ischemic attack)      Past Surgical History:   Procedure Laterality Date    APPENDECTOMY      CARDIAC CATHETERIZATION      CATARACT EXTRACTION W/  INTRAOCULAR LENS IMPLANT Bilateral      SECTION, CLASSIC      CLOSURE OF LEFT ATRIAL APPENDAGE USING DEVICE N/A 2020    Procedure: Left atrial appendage closure device;  Surgeon: Abundio Curtis MD;  Location: [facility] CATH LAB;  Service: Cardiology;  Laterality: N/A;    HYSTERECTOMY      INNER EAR SURGERY      JOINT REPLACEMENT      LEFT KNEE REPLACEMENT IN 2013 -    OOPHORECTOMY      SINUS SURGERY      STRABISMUS SURGERY  13    RSR recession 5 mm, LLR recession 5 mm and LMR resection 4mm    STRABISMUS SURGERY  2014    recess LR OD 6mm    TONSILLECTOMY      watchman surgery N/A 2020     Social History     Socioeconomic History    Marital status:    Occupational History    Occupation: retired   Tobacco Use    Smoking status: Former Smoker     Types: Cigarettes     Quit date: 10/29/1982     Years since quittin.3    Smokeless tobacco: Never Used   Substance and Sexual Activity    Alcohol use: Not Currently     Comment: socially    Drug use: No    Sexual activity: Never   Other Topics Concern    Are you pregnant or think you may be? No    Breast-feeding No     Social Determinants of Health     Financial Resource Strain: Low Risk     Difficulty of Paying Living Expenses: Not hard at all   Food Insecurity: No Food Insecurity    Worried About Running Out of Food in the Last Year: Never true    Ran Out of Food in the Last Year: Never true   Transportation Needs: No Transportation Needs    Lack of Transportation (Medical): No    Lack of Transportation (Non-Medical): No   Physical Activity: Inactive    Days of Exercise  "per Week: 0 days    Minutes of Exercise per Session: 0 min   Stress: Stress Concern Present    Feeling of Stress : To some extent   Social Connections: Socially Isolated    Frequency of Communication with Friends and Family: Once a week    Frequency of Social Gatherings with Friends and Family: Once a week    Attends Latter-day Services: Never    Active Member of Clubs or Organizations: No    Attends Club or Organization Meetings: Never    Marital Status:    Housing Stability: Unknown    Unable to Pay for Housing in the Last Year: No    Unstable Housing in the Last Year: No     Family History   Problem Relation Age of Onset    Hypertension Mother     Hypertension Father     Liver disease Sister     Diabetes Sister     Heart disease Sister     Diabetes Brother     Breast cancer Maternal Aunt     No Known Problems Maternal Uncle     No Known Problems Paternal Aunt     No Known Problems Paternal Uncle     No Known Problems Maternal Grandmother     No Known Problems Maternal Grandfather     No Known Problems Paternal Grandmother     No Known Problems Paternal Grandfather     No Known Problems Daughter     No Known Problems Son     Heart disease Sister     No Known Problems Son     No Known Problems Son     Cancer Neg Hx         in first degree relatives    Melanoma Neg Hx     Psoriasis Neg Hx     Lupus Neg Hx     Amblyopia Neg Hx     Blindness Neg Hx     Cataracts Neg Hx     Glaucoma Neg Hx     Macular degeneration Neg Hx     Retinal detachment Neg Hx     Strabismus Neg Hx     Stroke Neg Hx     Thyroid disease Neg Hx        Review of patient's allergies indicates:   Allergen Reactions    Opioids - morphine analogues Other (See Comments)     Bowel issues; bowel obstruction    Tizanidine Other (See Comments)     "Lips were numb,  Almost passed out."    Tramadol Hallucinations    Beta-blockers (beta-adrenergic blocking agts) Other (See Comments)     Can not go on beta " blockers for long period of time - due to taking allergy injections    Morphine     Opioids-meperidine and related        Current Outpatient Medications   Medication Sig    cholecalciferol, vitamin D3, (VITAMIN D3 ORAL) Take 1 tablet by mouth once daily.    fluticasone propionate (FLONASE) 50 mcg/actuation nasal spray USE 1 SPRAY IN EACH NOSTRIL ONE TIME DAILY    FLUZONE HIGHDOSE QUAD 21-22  mcg/0.7 mL Syrg     furosemide (LASIX) 20 MG tablet Take 1 tablet (20 mg total) by mouth 2 (two) times daily before meals. (Patient taking differently: Take 20 mg by mouth once daily. And as needed)    multivitamin (THERAGRAN) per tablet Take 1 tablet by mouth once daily.    nitrofurantoin, macrocrystal-monohydrate, (MACROBID) 100 MG capsule Take 1 capsule (100 mg total) by mouth 2 (two) times daily.    pravastatin (PRAVACHOL) 40 MG tablet Take 1 tablet (40 mg total) by mouth once daily.    psyllium 0.52 gram capsule Take 9 g by mouth once daily. Pt takes 3 capsules 3 times a day    spironolactone (ALDACTONE) 25 MG tablet Take 1 tablet (25 mg total) by mouth 2 (two) times daily before meals.    vit C/E/Zn/coppr/lutein/zeaxan (OCUVITE LUTEIN AND ZEAXANTHIN ORAL) Take 1 capsule by mouth once daily. Lutien 25 mg, Zeaxanthin 5 mg    vitamin E 400 UNIT capsule Take 400 Units by mouth once daily.    aspirin (ECOTRIN) 81 MG EC tablet Take 1 tablet (81 mg total) by mouth once daily.    diclofenac sodium (VOLTAREN) 1 % Gel Apply 2 g topically 4 (four) times daily. Use gloves to apply for 10 days     No current facility-administered medications for this visit.       REVIEW OF SYSTEMS:    GENERAL:  No weight loss, malaise or fevers.  HEENT:   No recent changes in vision or hearing (+)HAs, vision changes  NECK:  Negative for lumps, no difficulty with swallowing.  RESPIRATORY:  Negative for cough, wheezing or shortness of breath, patient denies any recent URI. (+) cough  CARDIOVASCULAR:  Negative for chest pain, leg  "swelling or palpitations. (+) Afib, CHF  GI:  Negative for abdominal discomfort, blood in stools or black stools or change in bowel habits.  MUSCULOSKELETAL:  See HPI.  SKIN:  Negative for lesions, rash, and itching. (+) easy bruising  PSYCH:  No mood disorder or recent psychosocial stressors.  Patients sleep is not disturbed secondary to pain.  HEMATOLOGY/LYMPHOLOGY:  Negative for prolonged bleeding, bruising easily or swollen nodes.  Patient is not currently taking any anti-coagulants  NEURO:   No history of headaches, syncope, paralysis, seizures or tremors.  All other reviewed and negative other than HPI.    OBJECTIVE:    /60   Pulse 61   Temp 98.2 °F (36.8 °C) (Oral)   Resp 19   Ht 5' 5" (1.651 m)   Wt 76.7 kg (169 lb)   LMP  (LMP Unknown)   BMI 28.12 kg/m²     PHYSICAL EXAMINATION:    GENERAL: Well appearing, in no acute distress, alert and oriented x3.  PSYCH:  Mood and affect appropriate.  SKIN: Skin color, texture, turgor normal, no rashes or lesions.  HEAD/FACE:  Normocephalic, atraumatic. Cranial nerves grossly intact.    NECK:    - Positive pain to palpation over the left trap muscle.   - Spurling  Negative.  - Positive pain with neck rotation to the left.     CV: RRR with palpation of the radial artery.  PULM: CTAB. No evidence of respiratory difficulty, symmetric chest rise.  GI:  Soft and non-tender.    MUSKULOSKELETAL:    EXTREMITIES:   Shoulder:  -Left shoulder atrophy of the deltoid  -no TTP of the shoulder joint  -limited passive ROM with only about 20 degrees of abduction, 30 degrees of shoulder flexion.  Active ROM is mostly normal without pain.  -weakness of the left shoulder is evident.  Elbow flexion normal.  normal. Elbow extension normal.      NEURO:  No clonus.  No loss of sensation is noted.    NEUROLOGICAL EXAM:  MENTAL STATUS: A x O x 3, good concentration, speech is fluent and goal directed  MEMORY: recent and remote are intact  CN: CN2-12 grossly intact  MOTOR: 5/5 in " left UE muscle groups.   DTRs: 1+ intact symmetric  Cox: absent on the bilateral side(s)

## 2022-02-22 ENCOUNTER — PATIENT MESSAGE (OUTPATIENT)
Dept: SPORTS MEDICINE | Facility: CLINIC | Age: 87
End: 2022-02-22
Payer: MEDICARE

## 2022-02-22 ENCOUNTER — TELEPHONE (OUTPATIENT)
Dept: SPORTS MEDICINE | Facility: CLINIC | Age: 87
End: 2022-02-22
Payer: MEDICARE

## 2022-02-22 NOTE — TELEPHONE ENCOUNTER
"Attempted to call patient regarding EMG scheduling, phone rang for several minutes and then recorded voices asked for an "access code". Unable to LVM. Aixa mess sent.     Clara Canela MS, OTC  Clinical Assistant to Dr. Martha Wren    "

## 2022-02-23 ENCOUNTER — CLINICAL SUPPORT (OUTPATIENT)
Dept: REHABILITATION | Facility: HOSPITAL | Age: 87
End: 2022-02-23
Attending: STUDENT IN AN ORGANIZED HEALTH CARE EDUCATION/TRAINING PROGRAM
Payer: MEDICARE

## 2022-02-23 DIAGNOSIS — R29.898 WEAKNESS OF SHOULDER: Primary | ICD-10-CM

## 2022-02-23 DIAGNOSIS — R68.89 IMPAIRED FUNCTION OF UPPER EXTREMITY: ICD-10-CM

## 2022-02-23 DIAGNOSIS — M75.102 TEAR OF LEFT SUPRASPINATUS TENDON: ICD-10-CM

## 2022-02-23 PROCEDURE — 97110 THERAPEUTIC EXERCISES: CPT | Mod: HCNC

## 2022-02-23 PROCEDURE — 97161 PT EVAL LOW COMPLEX 20 MIN: CPT | Mod: HCNC

## 2022-02-23 NOTE — PLAN OF CARE
"[facility]   Physical Therapy Initial Evaluation     Date: 2/23/2022   Name: Jenniffer Jimenez  Clinic Number: [id number]    Therapy Diagnosis:   Encounter Diagnoses   Name Primary?    Tear of left supraspinatus tendon     Weakness of shoulder Yes    Impaired function of upper extremity      Physician: Lionel Bright,*    Physician Orders: PT Eval and Treat    Left shoulder supraspinatus tear. Limited strengthen but minimal pain.  Needs strengthening of the left deltoid/shoulder      Medical Diagnosis from Referral: M75.102 (ICD-10-CM) - Tear of left supraspinatus tendon  Evaluation Date: 2/23/2022  Authorization Period Expiration: TBD  Plan of Care Expiration: 5/6/2022  Progress Note Due: 3/23/2022  Visit # / Visits authorized: 1/ TBD   FOTO: TBD    Precautions: Standard, Fall and HEARING IMPAIRED     Time In: 1255pm  Time Out: 145pm  Total Appointment Time (timed & untimed codes): 50 minutes      SUBJECTIVE     Date of onset: December    History of current condition - Jenniffer reports:  She tore her rotator cuff when she was picking something up. She has gotten home health which helped her pain and motion a lot. She still can't move her L arm much without her R arm helping. She does get soreness and pain in her shoulder after a day of inc'd activity. Her shoulder pain and function is impacting her ability to perform eating, showering, LE dressing, and toileting. She uses rollator for community ambulation and no AD at home. She has been performing exercises given to her by the home health therapist. She is concerned about participation in therapy due to financial and transportation considerations. She denies numbness or tingling. She describes the pain as an ache.    Falls: none recently    Imaging, MRI studies shoulder: "Impression: 1. Tendinosis and possible partial thickness tear involving the insertional supraspinatus.  Infraspinatus tendinosis.  No evidence for full-thickness " "rotator cuff tear.  Mild to moderate muscle atrophy. 2. Moderate acromioclavicular arthrosis. 3. Low signal material in the expected location of the subacromial subdeltoid bursa, likely complex bursitis.  Appearance may be seen with calcific or gouty bursitis"   MRI studies neck: "Impression:Multilevel cervical spondylosis, resulting in moderate/ severe spinal canal stenosis from C3-C4 to C5-C6 and moderate/ severe neural foraminal narrowing from C2-C3 to C6-C7, as above."    Prior Therapy: yes,  for shoulder  Social History: lives alone in [facility] with ramp to enter  Occupation: retired  Prior Level of Function: independent for ADLs, Mod I for community ambulation with rollator  Current Level of Function: Mod I for most ADLs, unable to perform adequate lower body dressing     Pain:  Current 0/10, worst 7/10, best 0/10   Location: left shoulder   Description: Aching, soreness  Aggravating Factors: inc'd activity  Easing Factors: tylenol    Patients goals: to be able to lift her arm to a level out in front of her (indicated to be 90*)     Medical History:   Past Medical History:   Diagnosis Date    Amblyopia of left eye 4/10/2013    Anxiety     Arthritis     Facet arthropathy, Lumbosacral    Cataract     Central retinal vein occlusion of left eye     Depression     Diabetic polyneuropathy 2022    Exotropia of both eyes 2013    recession RSR 5.0mm w/ adj; recession LR os 5.0 w/ adj; resect MR os  4.0mm    Hearing loss     History of resection of small bowel     Hypertensive retinopathy of both eyes     Hypoglycemia     Macular degeneration     OA (osteoarthritis) of shoulder     Right    Osteoporosis     Posterior vitreous detachment of both eyes     Rhinitis     TIA (transient ischemic attack)        Surgical History:   Jenniffer Jimenez  has a past surgical history that includes Cardiac catheterization; Inner ear surgery; Sinus surgery; Tonsillectomy;  section, classic; " "Appendectomy; Strabismus surgery (2/6/13); Strabismus surgery (03/19/2014); Cataract extraction w/  intraocular lens implant (Bilateral); Hysterectomy; Oophorectomy; Joint replacement; Closure of left atrial appendage using device (N/A, 11/4/2020); and watchman surgery (N/A, 11/2020).    Medications:   Jenniffer has a current medication list which includes the following prescription(s): aspirin, cholecalciferol (vitamin d3), diclofenac sodium, fluticasone propionate, fluzone highdose quad 21-22 pf, furosemide, multivitamin, nitrofurantoin (macrocrystal-monohydrate), pravastatin, psyllium, spironolactone, vit c/e/zn/coppr/lutein/zeaxan, and vitamin e.    Allergies:   Review of patient's allergies indicates:   Allergen Reactions    Opioids - morphine analogues Other (See Comments)     Bowel issues; bowel obstruction    Tizanidine Other (See Comments)     "Lips were numb,  Almost passed out."    Tramadol Hallucinations    Beta-blockers (beta-adrenergic blocking agts) Other (See Comments)     Can not go on beta blockers for long period of time - due to taking allergy injections    Morphine     Opioids-meperidine and related           OBJECTIVE     Shoulder Right Right Left Left Left Pain/Dysfunction with Movement    AROM MMT AROM PROM MMT    Flexion WFL 4-/5 25! WFL 1/5!    Extension WFL 4-/5 45 NT 2/5    Abduction WFL 4-/5 20! WFL 1/5!    Adduction WFL 4-/5 WNL WNL 2/5    Internal rotation WFL 4-/5 L5 45 2/5    External Rotation WFL 4-/5 0*! 55! 1/5!      Elbow Right Right Left Left Pain/Dysfunction with Movement    AROM MMT AROM MMT    Flexion WNL 4-/5 90! 3/5 Full PROM   Extension WNL 4-/5 0 3+/5       Strength: R>L WFL    Posture Assessment: elevated R scapula, fwd head, kyphosis    Palpation: no TTP t/o shoulder     Anterior Apprehension: negative       Limitation/Restriction for FOTO Survey    Therapist reviewed FOTO scores for Jenniffer Jimenez on 2/23/2022.   FOTO documents entered into EPIC - see Media " section.    Limitation Score: TBA%         TREATMENT     Total Treatment time (time-based codes) separate from Evaluation: 8 minutes      Jenniffer received the treatments listed below:      therapeutic exercises to develop strength, endurance, ROM, flexibility and posture for 8 minutes including:  Review or previously established HEP  Review of new HEP including the following:  Table circles x30 cw/ccw      PATIENT EDUCATION AND HOME EXERCISES     Education provided:   - Role of PT  - PT POC  - HEP    Written Home Exercises Provided: yes. Exercises were reviewed and [place] was able to demonstrate them prior to the end of the session.  Jenniffer demonstrated good  understanding of the education provided. See EMR under Patient Instructions for exercises provided during therapy sessions.    ASSESSMENT     Jenniffer is a 89 y.o. female referred to outpatient Physical Therapy with a medical diagnosis of L supraspinatus tear. Patient presents with impairments in strength, ROM, pain, functional mobility, joint mobility, motor control, tissue extensibility, activity tolerance, and posture. Pt with significantly increased PROM as compared to AROM and with minimal pain during PROM. Pt significantly limited by decreased L shoulder activation. Pt most limited in ER AROM. Cervical ROM does not reproduce symptoms.     Patient prognosis is Fair.   Patientt will benefit from skilled outpatient Physical Therapy to address the deficits stated above and in the chart below, provide patient /family education, and to maximize patientt's level of independence.     Plan of care discussed with patient: Yes  Patient's spiritual, cultural and educational needs considered and patient is agreeable to the plan of care and goals as stated below:     Anticipated Barriers for therapy: age, transportation, financial considerations, co-morbidities, pain    Medical Necessity is demonstrated by the following  History  Co-morbidities and personal factors that may  impact the plan of care Co-morbidities:   see history above    Personal Factors:   no deficits     moderate   Examination  Body Structures and Functions, activity limitations and participation restrictions that may impact the plan of care Body Regions:   neck  upper extremities    Body Systems:    gross symmetry  ROM  strength  gross coordinated movement  balance  transfers  transitions  motor control  motor learning  heart rate    Participation Restrictions:   ADLs as discussed above    Activity limitations:   Learning and applying knowledge  no deficits    General Tasks and Commands  no deficits    Communication  communicating with/receiving spoken language    Mobility  lifting and carrying objects  fine hand use (grasping/picking up)    Self care  washing oneself (bathing, drying, washing hands)  caring for body parts (brushing teeth, shaving, grooming)  toileting  dressing  eating    Domestic Life  cooking  doing house work (cleaning house, washing dishes, laundry)    Interactions/Relationships  no deficits    Life Areas  no deficits    Community and Social Life  community life  recreation and leisure         high   Clinical Presentation stable and uncomplicated low   Decision Making/ Complexity Score: low     Goals:  Short Term Goals (4 Weeks):   1. Pt will be independent with HEP to supplement PT in improving functional use of UE.  2. Pt will increase pain free shoulder elevation AROM to >/= 45 deg to improve functional mobility of UE  3. Pt will increase shoulder ER AROM abduction to >/=10 deg to improve functional mobility of UE  4. Pt will increase elbow AROM to 110 deg in 4 weeks to improve functional mobility of UE.  Long Term Goals (8 Weeks):   1. Pt will improve FOTO score to </=TBA% limited to decrease perceived limitation with carrying, moving, and handling objects.  2. Pt will increase shoulder elevation AROM to 90* to improve functional use of L RUE.  3. Pt will increase shoulder ER AROM to 20* to  improve functional use of L RUE.  4. Pt will improve impaired shoulder MMTs to = >/=3+/5 to promote equal use of B UEs in performing functional tasks.  5. Pt to report pain </= 3/10 with ADLs and IADLs using L UE to improve functional QOL.      PLAN   Plan of care Certification: 2/23/2022 to 5/6/2022.    Outpatient Physical Therapy 2 times weekly for 10 weeks to include the following interventions: Cervical/Lumbar Traction, Electrical Stimulation  , Gait Training, Manual Therapy, Moist Heat/ Ice, Neuromuscular Re-ed, Patient Education, Self Care, Therapeutic Activities, Therapeutic Exercise, Ultrasound and dry needling, IASTM, and kinesiotaping.     hCin Gardner, PT      I CERTIFY THE NEED FOR THESE SERVICES FURNISHED UNDER THIS PLAN OF TREATMENT AND WHILE UNDER MY CARE   Physician's comments:     Physician's Signature: ___________________________________________________

## 2022-02-28 ENCOUNTER — TELEPHONE (OUTPATIENT)
Dept: INTERNAL MEDICINE | Facility: CLINIC | Age: 87
End: 2022-02-28
Payer: MEDICARE

## 2022-02-28 NOTE — TELEPHONE ENCOUNTER
----- Message from Gillian Damon MA sent at 2/28/2022  2:19 PM CST -----  Contact: self [phone]  I opened the mail Friday and it was a duplicate to the faxed request we got the day before so I threw it out.    The fax request is on kofi koch's desk to sign when she returns to clinic on Thursday.      ----- Message -----  From: Nereida Ricks MA  Sent: 2/28/2022   2:17 PM CST  To: Gillian Damon MA    Have you or Yaima received this form in the mail?   Thank You,  YASIR Padron      ----- Message -----  From: Nereida Collado  Sent: 2/28/2022  10:32 AM CST  To: Hannah Venegas S Staff    Patient states that she mailed her application for [facility] to the office and needs to check the status of that. Please advise

## 2022-02-28 NOTE — TELEPHONE ENCOUNTER
Called pt but no answer. She then called right back and wanted to know if we received her transportation form for [employer]. I told her we did receive it and it is on the dr's desk and I will have her look at it and sign it on Wednesday. I told her I would fax it to the company and mail her the original.   She VU

## 2022-03-02 ENCOUNTER — LAB VISIT (OUTPATIENT)
Dept: LAB | Facility: HOSPITAL | Age: 87
End: 2022-03-02
Attending: INTERNAL MEDICINE
Payer: MEDICARE

## 2022-03-02 ENCOUNTER — TELEPHONE (OUTPATIENT)
Dept: INTERNAL MEDICINE | Facility: CLINIC | Age: 87
End: 2022-03-02
Payer: MEDICARE

## 2022-03-02 DIAGNOSIS — N39.0 URINARY TRACT INFECTION WITHOUT HEMATURIA, SITE UNSPECIFIED: ICD-10-CM

## 2022-03-02 DIAGNOSIS — N39.0 URINARY TRACT INFECTION WITHOUT HEMATURIA, SITE UNSPECIFIED: Primary | ICD-10-CM

## 2022-03-02 LAB
BACTERIA #/AREA URNS AUTO: ABNORMAL /HPF
BILIRUB UR QL STRIP: NEGATIVE
CLARITY UR REFRACT.AUTO: ABNORMAL
COLOR UR AUTO: YELLOW
GLUCOSE UR QL STRIP: NEGATIVE
HGB UR QL STRIP: ABNORMAL
HYALINE CASTS UR QL AUTO: 0 /LPF
KETONES UR QL STRIP: NEGATIVE
LEUKOCYTE ESTERASE UR QL STRIP: ABNORMAL
MICROSCOPIC COMMENT: ABNORMAL
NITRITE UR QL STRIP: NEGATIVE
PH UR STRIP: 5 [PH] (ref 5–8)
PROT UR QL STRIP: ABNORMAL
RBC #/AREA URNS AUTO: 14 /HPF (ref 0–4)
SP GR UR STRIP: 1.01 (ref 1–1.03)
SQUAMOUS #/AREA URNS AUTO: 1 /HPF
URN SPEC COLLECT METH UR: ABNORMAL
WBC #/AREA URNS AUTO: >100 /HPF (ref 0–5)
WBC CLUMPS UR QL AUTO: ABNORMAL

## 2022-03-02 PROCEDURE — 81001 URINALYSIS AUTO W/SCOPE: CPT | Mod: HCNC | Performed by: INTERNAL MEDICINE

## 2022-03-02 PROCEDURE — 87186 SC STD MICRODIL/AGAR DIL: CPT | Mod: HCNC | Performed by: INTERNAL MEDICINE

## 2022-03-02 PROCEDURE — 87077 CULTURE AEROBIC IDENTIFY: CPT | Mod: HCNC | Performed by: INTERNAL MEDICINE

## 2022-03-02 PROCEDURE — 87088 URINE BACTERIA CULTURE: CPT | Mod: HCNC | Performed by: INTERNAL MEDICINE

## 2022-03-02 PROCEDURE — 87086 URINE CULTURE/COLONY COUNT: CPT | Mod: HCNC | Performed by: INTERNAL MEDICINE

## 2022-03-02 RX ORDER — NITROFURANTOIN 25; 75 MG/1; MG/1
100 CAPSULE ORAL 2 TIMES DAILY
Qty: 20 CAPSULE | Refills: 0 | Status: SHIPPED | OUTPATIENT
Start: 2022-03-02 | End: 2022-03-03 | Stop reason: SDUPTHER

## 2022-03-02 NOTE — TELEPHONE ENCOUNTER
Called pt and she c/o UTI. Her symptoms are pain / pressure with urination, frequency and urgency. She denies blood in urine, fever or flank pain    Please order UA and UCx.     She would like to know if you can send an antibiotic to start and change if necessary once culture comes back    Also want to know if she can take something for the symptoms such as AZO

## 2022-03-02 NOTE — TELEPHONE ENCOUNTER
Called pt and let her know Urine ordered; abx sent to pharm; ok for her to take azo x 3 days as needed for symptoms  She VU

## 2022-03-02 NOTE — TELEPHONE ENCOUNTER
----- Message from Mallorie Cardona sent at 3/2/2022  7:06 AM CST -----  States she has a UTI. She did a test strip and it was positive. States she wants to know if she needs to come in and do blood work. States she is having some discomfort, pain and hot urine. Please call pt [phone]. Thank you

## 2022-03-03 ENCOUNTER — TELEPHONE (OUTPATIENT)
Dept: INTERNAL MEDICINE | Facility: CLINIC | Age: 87
End: 2022-03-03
Payer: MEDICARE

## 2022-03-03 DIAGNOSIS — N39.0 URINARY TRACT INFECTION WITHOUT HEMATURIA, SITE UNSPECIFIED: ICD-10-CM

## 2022-03-03 RX ORDER — NITROFURANTOIN 25; 75 MG/1; MG/1
100 CAPSULE ORAL 2 TIMES DAILY
Qty: 20 CAPSULE | Refills: 0 | Status: SHIPPED | OUTPATIENT
Start: 2022-03-03 | End: 2022-04-04

## 2022-03-03 RX ORDER — NITROFURANTOIN 25; 75 MG/1; MG/1
100 CAPSULE ORAL 2 TIMES DAILY
Qty: 20 CAPSULE | Refills: 0 | Status: CANCELLED | OUTPATIENT
Start: 2022-03-03

## 2022-03-03 NOTE — TELEPHONE ENCOUNTER
----- Message from Angelia Galarza sent at 3/3/2022  2:55 PM CST -----  Regarding: Call Back  Who Called: Marianne (Pharmacy)         What is the reason for the call: calling in regards to new prescription for pt         Can patient be contacted on Mychart: n/a         Call back number: [phone]

## 2022-03-03 NOTE — TELEPHONE ENCOUNTER
[facility] and she wanted to be sure we were sending pt's rx to [facility] so it could be delivered today  I told her it was being handled now  She VU

## 2022-03-04 ENCOUNTER — TELEPHONE (OUTPATIENT)
Dept: INTERNAL MEDICINE | Facility: CLINIC | Age: 87
End: 2022-03-04
Payer: MEDICARE

## 2022-03-04 NOTE — TELEPHONE ENCOUNTER
----- Message from Alia Isarua sent at 3/4/2022  2:40 PM CST -----  Contact: [phone] Patient  Caller is requesting an earlier appointment then we can schedule.  Caller is requesting a message be sent to the provider.  If this is for urgent care symptoms, did you offer other providers at this location, providers at other locations, or [facility]? (yes, no, n/a):  N/A  If this is for the patients physical, did you offer to schedule next available and put on wait list, or to see NP or PA for their physical?  (yes, no, n/a):  yes  When is the next available appointment with their provider:  08/11; Pt states this is 4 months before [place]  Reason for the appointment:  physical  Patient preference of timeframe to be scheduled:  05/07/22 or after  Would the patient like a call back, or a response through their [facility] portal?:  call back  Comments:

## 2022-03-04 NOTE — TELEPHONE ENCOUNTER
----- Message from Tavia Hightower sent at 3/3/2022  3:14 PM CST -----  Contact: Marianne [phone]  Pharmacy is requesting to speak to you again Nereida Ricks MA    Please call and advise.    Thank You

## 2022-03-04 NOTE — TELEPHONE ENCOUNTER
Called pharm and spoke to hilton and she said christiana was not in and no one knows what she was calling for  She can see where the rx was filled at [facility] but can't tell me if it was delivered

## 2022-03-05 ENCOUNTER — NURSE TRIAGE (OUTPATIENT)
Dept: ADMINISTRATIVE | Facility: CLINIC | Age: 87
End: 2022-03-05
Payer: MEDICARE

## 2022-03-05 LAB — BACTERIA UR CULT: ABNORMAL

## 2022-03-05 NOTE — TELEPHONE ENCOUNTER
Spoke with patient states she has a bladder infection that she is being treated for with Macrobid. Patient states she now havif covid/flu like symptoms that started last night.  Current symptoms are mild difficulty breathing, tightness in chest dizziness, fever body aches, cough, and chills.  Fever ranging between 100.6-101.0.  Patient states she is ambulating with there walker which she does not normally.  Per protocol advised ER and patient verbalized understanding.  States she will take a cab.     Reason for Disposition   Difficulty breathing    Additional Information   Negative: SEVERE difficulty breathing (e.g., struggling for each breath, speaks in single words)   Negative: Difficult to awaken or acting confused (e.g., disoriented, slurred speech)   Negative: Bluish (or gray) lips or face now   Negative: Shock suspected (e.g., cold/pale/clammy skin, too weak to stand, low BP, rapid pulse)   Negative: Sounds like a life-threatening emergency to the triager   Negative: SEVERE or constant chest pain or pressure (Exception: mild central chest pain, present only when coughing)   Negative: MODERATE difficulty breathing (e.g., speaks in phrases, SOB even at rest, pulse 100-120)   Negative: [1] Headache AND [2] stiff neck (can't touch chin to chest)   Negative: Chest pain or pressure   Negative: Patient sounds very sick or weak to the triager   MILD difficulty breathing (e.g., minimal/no SOB at rest, SOB with walking, pulse <100)   Negative: SEVERE difficulty breathing (e.g., struggling for each breath, speaks in single words)   Negative: [1] Difficulty breathing or swallowing AND [2] started suddenly after medicine, an allergic food or bee sting   Negative: Shock suspected (e.g., cold/pale/clammy skin, too weak to stand, low BP, rapid pulse)   Negative: Difficult to awaken or acting confused (e.g., disoriented, slurred speech)   Negative: [1] Weakness (i.e., paralysis, loss of muscle strength) of the  face, arm or leg on one side of the body AND [2] sudden onset AND [3] present now   Negative: [1] Numbness (i.e., loss of sensation) of the face, arm or leg on one side of the body AND [2] sudden onset AND [3] present now   Negative: [1] Loss of speech or garbled speech AND [2] sudden onset AND [3] present now   Negative: Overdose (accidental or intentional) of medications   Negative: [1] Fainted > 15 minutes ago AND [2] still feels too weak or dizzy to stand   Negative: Heart beating < 50 beats per minute OR > 140 beats per minute   Negative: Sounds like a life-threatening emergency to the triager    Protocols used: DIZZINESS - [id number], CORONAVIRUS (COVID-19) DIAGNOSED OR [id number]

## 2022-03-07 ENCOUNTER — CLINICAL SUPPORT (OUTPATIENT)
Dept: INTERNAL MEDICINE | Facility: CLINIC | Age: 87
End: 2022-03-07
Payer: MEDICARE

## 2022-03-07 DIAGNOSIS — Z91.048 ALLERGY TO MOLD: ICD-10-CM

## 2022-03-07 DIAGNOSIS — J30.1 ALLERGIC RHINITIS DUE TO POLLEN, UNSPECIFIED SEASONALITY: Primary | ICD-10-CM

## 2022-03-07 DIAGNOSIS — Z91.09 ALLERGY TO MITES: ICD-10-CM

## 2022-03-07 PROCEDURE — 95115 PR IMMUNOTHERAPY, ONE INJECTION: ICD-10-PCS | Mod: HCNC,S$GLB,, | Performed by: STUDENT IN AN ORGANIZED HEALTH CARE EDUCATION/TRAINING PROGRAM

## 2022-03-07 PROCEDURE — 99499 NO LOS: ICD-10-PCS | Mod: HCNC,S$GLB,, | Performed by: ALLERGY & IMMUNOLOGY

## 2022-03-07 PROCEDURE — 99499 UNLISTED E&M SERVICE: CPT | Mod: HCNC,S$GLB,, | Performed by: ALLERGY & IMMUNOLOGY

## 2022-03-07 PROCEDURE — 95115 IMMUNOTHERAPY ONE INJECTION: CPT | Mod: HCNC,S$GLB,, | Performed by: STUDENT IN AN ORGANIZED HEALTH CARE EDUCATION/TRAINING PROGRAM

## 2022-03-09 ENCOUNTER — PATIENT MESSAGE (OUTPATIENT)
Dept: NEUROLOGY | Facility: CLINIC | Age: 87
End: 2022-03-09
Payer: MEDICARE

## 2022-03-16 ENCOUNTER — CLINICAL SUPPORT (OUTPATIENT)
Dept: REHABILITATION | Facility: HOSPITAL | Age: 87
End: 2022-03-16
Attending: STUDENT IN AN ORGANIZED HEALTH CARE EDUCATION/TRAINING PROGRAM
Payer: MEDICARE

## 2022-03-16 DIAGNOSIS — R68.89 IMPAIRED FUNCTION OF UPPER EXTREMITY: ICD-10-CM

## 2022-03-16 DIAGNOSIS — R29.898 WEAKNESS OF SHOULDER: ICD-10-CM

## 2022-03-16 DIAGNOSIS — M75.102 TEAR OF LEFT SUPRASPINATUS TENDON: Primary | ICD-10-CM

## 2022-03-16 PROCEDURE — 97110 THERAPEUTIC EXERCISES: CPT | Mod: HCNC

## 2022-03-16 NOTE — PROGRESS NOTES
[facility]   Physical Therapy Treatment Note     Name: Jenniffer Jimenez  Clinic Number: [id number]    Therapy Diagnosis:   Encounter Diagnoses   Name Primary?    Tear of left supraspinatus tendon Yes    Weakness of shoulder     Impaired function of upper extremity      Physician: Lionel Bright,*    Visit Date: 3/16/2022    Physician Orders: PT Eval and Treat     Left shoulder supraspinatus tear. Limited strengthen but minimal pain.  Needs strengthening of the left deltoid/shoulder      Medical Diagnosis from Referral: M75.102 (ICD-10-CM) - Tear of left supraspinatus tendon  Evaluation Date: 2/23/2022  Authorization Period Expiration: 5/15/2022  Plan of Care Expiration: 5/6/2022  Progress Note Due: 3/23/2022  Visit # / Visits authorized: 1/1, 1/20   FOTO: 0/5     Precautions: Standard, Fall and HEARING IMPAIRED      PTA Visit #: 0/5     Time In: 145  Time Out: 233  Total Billable Time: 48 minutes    SUBJECTIVE     Pt reports: her shoulder has been feeling good. She has been very compliant with her HEP and feels like her shoulder has gotten a little better already.  She was compliant with home exercise program.  Response to previous treatment: no adverse effects  Functional change: none noted    Pain: 0/10  Location: left shoulder      OBJECTIVE     Objective Measures updated at progress report unless specified.     FOTO: 66%    Treatment     Jenniffer received the treatments listed below:      therapeutic exercises to develop strength, endurance, ROM, flexibility and posture for 48 minutes including:  Table circles   - x15 cw   - x15 ccw  Ball slides   - fwd x15  - scaption x15  Isometrics   - ER x10   - IR   - add 2x10   - abd x10  Seated chest press 2x12  Biceps curl 1lb with t-bar and shoulder flexion leaning at table 2x10  Rows green theraband 2x10 leaning at table  Biceps curl with 1lb dumbbell x10      Patient Education and Home Exercises     Home Exercises Provided and  Patient Education Provided     Education provided:   - HEP    Written Home Exercises Provided: yes. Exercises were reviewed and Jenniffer was able to demonstrate them prior to the end of the session.  Jenniffer demonstrated good  understanding of the education provided. See EMR under Patient Instructions for exercises provided during therapy sessions    ASSESSMENT     Pt tolerated first session after initial evaluation well today with no adverse effects. Exercises emphasized activation of shoulder muscles and strengthening of upper extremity. Pt requires frequent seated rest breaks and SPV for all standing activities due to impaired balance.     Jenniffer Is progressing well towards her goals.   Patient prognosis is Fair.   Patientt will benefit from skilled outpatient Physical Therapy to address the deficits stated above and in the chart below, provide patient /family education, and to maximize patientt's level of independence.      Plan of care discussed with patient: Yes  Patient's spiritual, cultural and educational needs considered and patient is agreeable to the plan of care and goals as stated below:      Anticipated Barriers for therapy: age, transportation, financial considerations, co-morbidities, pain      Goals:   Short Term Goals (4 Weeks):   1. Pt will be independent with HEP to supplement PT in improving functional use of UE.  2. Pt will increase pain free shoulder elevation AROM to >/= 45 deg to improve functional mobility of UE  3. Pt will increase shoulder ER AROM abduction to >/=10 deg to improve functional mobility of UE  4. Pt will increase elbow AROM to 110 deg in 4 weeks to improve functional mobility of UE.  Long Term Goals (8 Weeks):   1. Pt will improve FOTO score to </=46% limited to decrease perceived limitation with carrying, moving, and handling objects.  2. Pt will increase shoulder elevation AROM to 90* to improve functional use of L RUE.  3. Pt will increase shoulder ER AROM to 20* to improve  functional use of L RUE.  4. Pt will improve impaired shoulder MMTs to = >/=3+/5 to promote equal use of B UEs in performing functional tasks.  5. Pt to report pain </= 3/10 with ADLs and IADLs using L UE to improve functional QOL.    PLAN     Plan of care Certification: 2/23/2022 to 5/6/2022.     Outpatient Physical Therapy 2 times weekly for 10 weeks to include the following interventions: Cervical/Lumbar Traction, Electrical Stimulation  , Gait Training, Manual Therapy, Moist Heat/ Ice, Neuromuscular Re-ed, Patient Education, Self Care, Therapeutic Activities, Therapeutic Exercise, Ultrasound and dry needling, IASTM, and kinesiotaping.       Chin Gardner, PT

## 2022-03-17 ENCOUNTER — DOCUMENT SCAN (OUTPATIENT)
Dept: HOME HEALTH SERVICES | Facility: HOSPITAL | Age: 87
End: 2022-03-17
Payer: MEDICARE

## 2022-03-18 ENCOUNTER — TELEPHONE (OUTPATIENT)
Dept: ALLERGY | Facility: CLINIC | Age: 87
End: 2022-03-18
Payer: MEDICARE

## 2022-03-18 NOTE — TELEPHONE ENCOUNTER
----- Message from Nereida Rivera LPN sent at 3/18/2022  9:37 AM CDT -----  Regarding: FW: pt    ----- Message -----  From: Sagrario Nguyen  Sent: 3/18/2022   9:01 AM CDT  To: Salvador OLSEN Staff  Subject: pt                                               Pt is calling to speak with the nurse Radha pt said she will further discuss when the nurse calls her back can you please call pt at [phone].    LOU       
Pt. Was calling to verify the correct day for her next allergy injection , so she could set up her transportation    
normal...

## 2022-03-23 ENCOUNTER — CLINICAL SUPPORT (OUTPATIENT)
Dept: REHABILITATION | Facility: HOSPITAL | Age: 87
End: 2022-03-23
Attending: STUDENT IN AN ORGANIZED HEALTH CARE EDUCATION/TRAINING PROGRAM
Payer: MEDICARE

## 2022-03-23 DIAGNOSIS — R68.89 IMPAIRED FUNCTION OF UPPER EXTREMITY: ICD-10-CM

## 2022-03-23 DIAGNOSIS — R29.898 WEAKNESS OF SHOULDER: ICD-10-CM

## 2022-03-23 DIAGNOSIS — M75.102 TEAR OF LEFT SUPRASPINATUS TENDON: Primary | ICD-10-CM

## 2022-03-23 PROCEDURE — 97110 THERAPEUTIC EXERCISES: CPT | Mod: CQ

## 2022-03-23 PROCEDURE — 97140 MANUAL THERAPY 1/> REGIONS: CPT | Mod: CQ
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
## 2022-03-24 ENCOUNTER — DOCUMENTATION ONLY (OUTPATIENT)
Dept: REHABILITATION | Facility: HOSPITAL | Age: 87
End: 2022-03-24
Payer: MEDICARE

## 2022-03-24 NOTE — PROGRESS NOTES
PT/PTA met face to face to discuss pt's treatment plan and progress towards established goals. Pt will be seen by a physical therapist minimally every 6th visit or every 30 days.    Hao Andrade PTA    Face to Face PTA Conference performed with Tanja Argueta PTA and Hao Andrade PTA regarding patient's current status, overall progress, and plan of care.    Chin Gardner, PT

## 2022-04-04 ENCOUNTER — TELEPHONE (OUTPATIENT)
Dept: INTERNAL MEDICINE | Facility: CLINIC | Age: 87
End: 2022-04-04

## 2022-04-04 ENCOUNTER — OFFICE VISIT (OUTPATIENT)
Dept: INTERNAL MEDICINE | Facility: CLINIC | Age: 87
End: 2022-04-04
Payer: MEDICARE

## 2022-04-04 ENCOUNTER — CLINICAL SUPPORT (OUTPATIENT)
Dept: INTERNAL MEDICINE | Facility: CLINIC | Age: 87
End: 2022-04-04
Payer: MEDICARE

## 2022-04-04 VITALS
WEIGHT: 168.63 LBS | RESPIRATION RATE: 18 BRPM | HEIGHT: 65 IN | SYSTOLIC BLOOD PRESSURE: 132 MMHG | DIASTOLIC BLOOD PRESSURE: 68 MMHG | BODY MASS INDEX: 28.1 KG/M2 | HEART RATE: 78 BPM | OXYGEN SATURATION: 96 % | TEMPERATURE: 98 F

## 2022-04-04 DIAGNOSIS — J30.1 ALLERGIC RHINITIS DUE TO POLLEN, UNSPECIFIED SEASONALITY: Primary | ICD-10-CM

## 2022-04-04 DIAGNOSIS — S80.811A INFECTED ABRASION OF SKIN OF RIGHT LOWER LEG: Primary | ICD-10-CM

## 2022-04-04 DIAGNOSIS — L03.115 CELLULITIS OF RIGHT ANTERIOR LOWER LEG: ICD-10-CM

## 2022-04-04 DIAGNOSIS — R73.03 PREDIABETES: ICD-10-CM

## 2022-04-04 DIAGNOSIS — I48.20 CHRONIC ATRIAL FIBRILLATION: Chronic | ICD-10-CM

## 2022-04-04 DIAGNOSIS — L08.9 INFECTED ABRASION OF SKIN OF RIGHT LOWER LEG: Primary | ICD-10-CM

## 2022-04-04 DIAGNOSIS — Z91.09 ALLERGY TO MITES: ICD-10-CM

## 2022-04-04 DIAGNOSIS — Z91.048 ALLERGY TO MOLD: ICD-10-CM

## 2022-04-04 DIAGNOSIS — I10 PRIMARY HYPERTENSION: ICD-10-CM

## 2022-04-04 PROCEDURE — 99499 NO LOS: ICD-10-PCS | Mod: S$GLB,,, | Performed by: ALLERGY & IMMUNOLOGY

## 2022-04-04 PROCEDURE — 3288F FALL RISK ASSESSMENT DOCD: CPT | Mod: CPTII,S$GLB,, | Performed by: INTERNAL MEDICINE

## 2022-04-04 PROCEDURE — 1159F MED LIST DOCD IN RCRD: CPT | Mod: CPTII,S$GLB,, | Performed by: INTERNAL MEDICINE

## 2022-04-04 PROCEDURE — 3288F PR FALLS RISK ASSESSMENT DOCUMENTED: ICD-10-PCS | Mod: CPTII,S$GLB,, | Performed by: INTERNAL MEDICINE

## 2022-04-04 PROCEDURE — 1159F PR MEDICATION LIST DOCUMENTED IN MEDICAL RECORD: ICD-10-PCS | Mod: CPTII,S$GLB,, | Performed by: INTERNAL MEDICINE

## 2022-04-04 PROCEDURE — 1160F PR REVIEW ALL MEDS BY PRESCRIBER/CLIN PHARMACIST DOCUMENTED: ICD-10-PCS | Mod: CPTII,S$GLB,, | Performed by: INTERNAL MEDICINE

## 2022-04-04 PROCEDURE — 95115 IMMUNOTHERAPY ONE INJECTION: CPT | Mod: S$GLB,,, | Performed by: FAMILY MEDICINE

## 2022-04-04 PROCEDURE — 1101F PT FALLS ASSESS-DOCD LE1/YR: CPT | Mod: CPTII,S$GLB,, | Performed by: INTERNAL MEDICINE

## 2022-04-04 PROCEDURE — 1125F AMNT PAIN NOTED PAIN PRSNT: CPT | Mod: CPTII,S$GLB,, | Performed by: INTERNAL MEDICINE

## 2022-04-04 PROCEDURE — 1125F PR PAIN SEVERITY QUANTIFIED, PAIN PRESENT: ICD-10-PCS | Mod: CPTII,S$GLB,, | Performed by: INTERNAL MEDICINE

## 2022-04-04 PROCEDURE — 99999 PR PBB SHADOW E&M-EST. PATIENT-LVL IV: ICD-10-PCS | Mod: PBBFAC,,, | Performed by: INTERNAL MEDICINE

## 2022-04-04 PROCEDURE — 99213 OFFICE O/P EST LOW 20 MIN: CPT | Mod: S$GLB,,, | Performed by: INTERNAL MEDICINE

## 2022-04-04 PROCEDURE — 99499 UNLISTED E&M SERVICE: CPT | Mod: S$GLB,,, | Performed by: ALLERGY & IMMUNOLOGY

## 2022-04-04 PROCEDURE — 99213 PR OFFICE/OUTPT VISIT, EST, LEVL III, 20-29 MIN: ICD-10-PCS | Mod: S$GLB,,, | Performed by: INTERNAL MEDICINE

## 2022-04-04 PROCEDURE — 1160F RVW MEDS BY RX/DR IN RCRD: CPT | Mod: CPTII,S$GLB,, | Performed by: INTERNAL MEDICINE

## 2022-04-04 PROCEDURE — 1101F PR PT FALLS ASSESS DOC 0-1 FALLS W/OUT INJ PAST YR: ICD-10-PCS | Mod: CPTII,S$GLB,, | Performed by: INTERNAL MEDICINE

## 2022-04-04 PROCEDURE — 95115 PR IMMUNOTHERAPY, ONE INJECTION: ICD-10-PCS | Mod: S$GLB,,, | Performed by: FAMILY MEDICINE

## 2022-04-04 PROCEDURE — 99999 PR PBB SHADOW E&M-EST. PATIENT-LVL IV: CPT | Mod: PBBFAC,,, | Performed by: INTERNAL MEDICINE

## 2022-04-04 RX ORDER — SULFAMETHOXAZOLE AND TRIMETHOPRIM 800; 160 MG/1; MG/1
1 TABLET ORAL 2 TIMES DAILY
Qty: 20 TABLET | Refills: 0 | Status: SHIPPED | OUTPATIENT
Start: 2022-04-04 | End: 2022-04-04 | Stop reason: SDUPTHER

## 2022-04-04 RX ORDER — SULFAMETHOXAZOLE AND TRIMETHOPRIM 800; 160 MG/1; MG/1
1 TABLET ORAL 2 TIMES DAILY
Qty: 20 TABLET | Refills: 0 | Status: SHIPPED | OUTPATIENT
Start: 2022-04-04 | End: 2022-04-14

## 2022-04-04 NOTE — PATIENT INSTRUCTIONS
Start generic Bactrim twice a day for leg infection.  Daily cleansing of wound with hydrogen peroxide.  Continue all regualr meds.\  Followup in 1 week if not improving.

## 2022-04-04 NOTE — PROGRESS NOTES
Subjective:       Patient ID: Jenniffer Jimenez is a 89 y.o. female.    Chief Complaint: Leg Pain (Rt leg pain/Infection )    Patient whom I have seen once before presents for treatment of an apparent infected skin abrasion on right lower leg.  She hit the shin on a bed frame several days ago.  It has become red and painful with some heat to touch.  She is concerned.  No purulent drainage.  No fever/chills.    She has been trying to clean it and apply triple antibiotic ointment.    Compliant with her chronic medical condition medications.  Has Chronic atrial fib and is a prediabetic.  Also, CKD.    Review of Systems   Constitutional: Negative for chills and fever.   Respiratory: Negative.    Cardiovascular: Negative.    Gastrointestinal: Negative.    Integumentary:  Positive for wound.   Neurological: Negative for dizziness, light-headedness and headaches.         Objective:      Physical Exam  Vitals reviewed.   Constitutional:       General: She is not in acute distress.     Appearance: Normal appearance.   Cardiovascular:      Rate and Rhythm: Normal rate. Rhythm irregular.      Pulses: Normal pulses.      Heart sounds: Normal heart sounds.   Pulmonary:      Effort: Pulmonary effort is normal. No respiratory distress.      Breath sounds: Normal breath sounds.   Skin:     Comments: Red warm skin abrasion on right mid shin area.  No drainage but tender to palpation. Area about 2 X 4 inches.  Early scab formation noted.    Good depend pulses.   Neurological:      Mental Status: She is alert.         Assessment:       Problem List Items Addressed This Visit     Chronic atrial fibrillation (Chronic)    Prediabetes    Primary hypertension      Other Visit Diagnoses     Infected abrasion of skin of right lower leg    -  Primary    Relevant Medications    sulfamethoxazole-trimethoprim 800-160mg (BACTRIM DS) 800-160 mg Tab    Cellulitis of right anterior lower leg        Relevant Medications     sulfamethoxazole-trimethoprim 800-160mg (BACTRIM DS) 800-160 mg Tab          Plan:   1. Cleaned and dressed in the office.  2. Start generic bactrim BID for 10 days.  3. Daily cleansing and dressing at home.  4. Continue all regular meds.  5. followup in 1 week for recheck.

## 2022-04-04 NOTE — PROGRESS NOTES
SQ-RT UPPER ARM  allergy injection red vial #1/1 DM/M/ 0.5 ml  given,tolerated well. Pt. Waited 30 minutes, no local reaction noted

## 2022-04-04 NOTE — TELEPHONE ENCOUNTER
Pt called.    States she cannot take Bactrim D, prescribed today, because she's on a dieretic.    Please advise.

## 2022-04-04 NOTE — TELEPHONE ENCOUNTER
Called pt and she states she was seen by dr villar today and he prescribe a sulfa antibiotic. When she went to pick it up she was told by pharmacist that she should cut back on her Lasix and Aldactone while taking this antibiotic. However, she has gained a few pounds in the last couple of days and was going to double up these meds before being told about cutting back.   What should see do    Please advise

## 2022-04-04 NOTE — TELEPHONE ENCOUNTER
----- Message from Priyanka Madrid sent at 4/4/2022  3:39 PM CDT -----  Regarding: advice - request call back  Contact: steve   [phone]  Patient is requesting a call back from Nereida because she cant take prescribed medication.  Please call

## 2022-04-05 RX ORDER — DOXYCYCLINE 100 MG/1
100 CAPSULE ORAL 2 TIMES DAILY
Qty: 14 CAPSULE | Refills: 0 | Status: SHIPPED | OUTPATIENT
Start: 2022-04-05 | End: 2022-04-12

## 2022-04-05 NOTE — TELEPHONE ENCOUNTER
Spoke with pt to advise.    Verbalized understanding.    Requested that medication is changed from Bactrim to Doxy.    Thank you.

## 2022-04-14 ENCOUNTER — DOCUMENTATION ONLY (OUTPATIENT)
Dept: REHABILITATION | Facility: HOSPITAL | Age: 87
End: 2022-04-14
Payer: MEDICARE

## 2022-04-14 NOTE — PROGRESS NOTES
PT/PTA met face to face to discuss pt's treatment plan and progress towards established goals. Pt will be seen by a physical therapist minimally every 6th visit or every 30 days.    Hao Andrade PTA    Face to Face PTA Conference performed with Hao Andrade PTA regarding patient's current status, overall progress, and plan of care.    Chin Gardner, PT

## 2022-05-02 ENCOUNTER — CLINICAL SUPPORT (OUTPATIENT)
Dept: INTERNAL MEDICINE | Facility: CLINIC | Age: 87
End: 2022-05-02
Payer: MEDICARE

## 2022-05-02 DIAGNOSIS — Z91.09 ALLERGY TO MITES: ICD-10-CM

## 2022-05-02 DIAGNOSIS — Z91.048 ALLERGY TO MOLD: ICD-10-CM

## 2022-05-02 DIAGNOSIS — J30.1 ALLERGIC RHINITIS DUE TO POLLEN, UNSPECIFIED SEASONALITY: Primary | ICD-10-CM

## 2022-05-02 PROCEDURE — 99499 NO LOS: ICD-10-PCS | Mod: S$GLB,,, | Performed by: ALLERGY & IMMUNOLOGY

## 2022-05-02 PROCEDURE — 95115 PR IMMUNOTHERAPY, ONE INJECTION: ICD-10-PCS | Mod: S$GLB,,, | Performed by: STUDENT IN AN ORGANIZED HEALTH CARE EDUCATION/TRAINING PROGRAM

## 2022-05-02 PROCEDURE — 99499 UNLISTED E&M SERVICE: CPT | Mod: S$GLB,,, | Performed by: ALLERGY & IMMUNOLOGY

## 2022-05-02 PROCEDURE — 95115 IMMUNOTHERAPY ONE INJECTION: CPT | Mod: S$GLB,,, | Performed by: STUDENT IN AN ORGANIZED HEALTH CARE EDUCATION/TRAINING PROGRAM

## 2022-05-02 NOTE — PROGRESS NOTES
SQ-LT UPPER ARM  ALLERGY injection red vial #1/1 DM/M/ 0.5 ml   given,tolerated well. Pt waited 30 minutes, no local reaction noted

## 2022-05-03 ENCOUNTER — TELEPHONE (OUTPATIENT)
Dept: PAIN MEDICINE | Facility: CLINIC | Age: 87
End: 2022-05-03
Payer: MEDICARE

## 2022-05-03 DIAGNOSIS — M75.102 TEAR OF LEFT SUPRASPINATUS TENDON: Primary | ICD-10-CM

## 2022-05-03 DIAGNOSIS — M62.512 ATROPHY OF MUSCLE OF LEFT SHOULDER: ICD-10-CM

## 2022-05-03 NOTE — TELEPHONE ENCOUNTER
----- Message from Paul Laughlin sent at 5/3/2022  2:06 PM CDT -----  Contact: @ [phone]  Patient [facility] ), pls fax order to: [phone]

## 2022-05-03 NOTE — TELEPHONE ENCOUNTER
Staff contacted patient regarding missed call. Patient informed staff that she would like her pt request sent to a facility outside of [facility]. Staff verbalized understanding and will fax over to the number we received.

## 2022-05-24 ENCOUNTER — OFFICE VISIT (OUTPATIENT)
Dept: OTOLARYNGOLOGY | Facility: CLINIC | Age: 87
End: 2022-05-24
Payer: MEDICARE

## 2022-05-24 DIAGNOSIS — H61.23 BILATERAL IMPACTED CERUMEN: Primary | ICD-10-CM

## 2022-05-24 PROCEDURE — 3288F FALL RISK ASSESSMENT DOCD: CPT | Mod: CPTII,S$GLB,, | Performed by: NURSE PRACTITIONER

## 2022-05-24 PROCEDURE — 69210 EAR CERUMEN REMOVAL: ICD-10-PCS | Mod: S$GLB,,, | Performed by: NURSE PRACTITIONER

## 2022-05-24 PROCEDURE — 99999 PR PBB SHADOW E&M-EST. PATIENT-LVL III: CPT | Mod: PBBFAC,,, | Performed by: NURSE PRACTITIONER

## 2022-05-24 PROCEDURE — 1159F MED LIST DOCD IN RCRD: CPT | Mod: CPTII,S$GLB,, | Performed by: NURSE PRACTITIONER

## 2022-05-24 PROCEDURE — 1160F RVW MEDS BY RX/DR IN RCRD: CPT | Mod: CPTII,S$GLB,, | Performed by: NURSE PRACTITIONER

## 2022-05-24 PROCEDURE — 99499 NO LOS: ICD-10-PCS | Mod: S$GLB,,, | Performed by: NURSE PRACTITIONER

## 2022-05-24 PROCEDURE — 1126F AMNT PAIN NOTED NONE PRSNT: CPT | Mod: CPTII,S$GLB,, | Performed by: NURSE PRACTITIONER

## 2022-05-24 PROCEDURE — 99999 PR PBB SHADOW E&M-EST. PATIENT-LVL III: ICD-10-PCS | Mod: PBBFAC,,, | Performed by: NURSE PRACTITIONER

## 2022-05-24 PROCEDURE — 1159F PR MEDICATION LIST DOCUMENTED IN MEDICAL RECORD: ICD-10-PCS | Mod: CPTII,S$GLB,, | Performed by: NURSE PRACTITIONER

## 2022-05-24 PROCEDURE — 99499 UNLISTED E&M SERVICE: CPT | Mod: S$GLB,,, | Performed by: NURSE PRACTITIONER

## 2022-05-24 PROCEDURE — 3288F PR FALLS RISK ASSESSMENT DOCUMENTED: ICD-10-PCS | Mod: CPTII,S$GLB,, | Performed by: NURSE PRACTITIONER

## 2022-05-24 PROCEDURE — 1101F PR PT FALLS ASSESS DOC 0-1 FALLS W/OUT INJ PAST YR: ICD-10-PCS | Mod: CPTII,S$GLB,, | Performed by: NURSE PRACTITIONER

## 2022-05-24 PROCEDURE — 1101F PT FALLS ASSESS-DOCD LE1/YR: CPT | Mod: CPTII,S$GLB,, | Performed by: NURSE PRACTITIONER

## 2022-05-24 PROCEDURE — 1126F PR PAIN SEVERITY QUANTIFIED, NO PAIN PRESENT: ICD-10-PCS | Mod: CPTII,S$GLB,, | Performed by: NURSE PRACTITIONER

## 2022-05-24 PROCEDURE — 69210 REMOVE IMPACTED EAR WAX UNI: CPT | Mod: S$GLB,,, | Performed by: NURSE PRACTITIONER

## 2022-05-24 PROCEDURE — 1160F PR REVIEW ALL MEDS BY PRESCRIBER/CLIN PHARMACIST DOCUMENTED: ICD-10-PCS | Mod: CPTII,S$GLB,, | Performed by: NURSE PRACTITIONER

## 2022-05-24 NOTE — PROCEDURES
Ear Cerumen Removal    Date/Time: 5/24/2022 9:00 AM  Performed by: Hung Luque DNP, FNP-C  Authorized by: Hung Luque DNP, FNP-C     Consent Done?:  Yes (Verbal)    Local anesthetic:  None  Location details:  Both ears  Procedure type: curette    Cerumen  Removal Results:  Cerumen completely removed  Patient tolerance:  Patient tolerated the procedure well with no immediate complications     Procedure Note:    The patient was brought to the minor procedure room and placed under the operating microscope of the left ear canal which was cleaned of ceruminous debris. Using a combination of suction, curettes and cup forceps the patient's cerumen impaction was removed. The tympanic membrane was evaluated and was unremarkable. The patient tolerated the procedure well. There were no complications.    Procedure Note:    Patient was brought to the minor procedure room and using the operating microscope of the right ear canal which was cleaned of ceruminous debris. There was a significant cerumen impaction.  Using a combination of suction, curettes and cup forceps the patient's cerumen impaction was removed. Tympanic membrane intact. Pt tolerated well. There were no complications.

## 2022-05-27 DIAGNOSIS — I50.32 CHRONIC DIASTOLIC HEART FAILURE: Chronic | ICD-10-CM

## 2022-05-27 DIAGNOSIS — I10 ESSENTIAL HYPERTENSION: Chronic | ICD-10-CM

## 2022-05-27 RX ORDER — FUROSEMIDE 20 MG/1
20 TABLET ORAL
Qty: 180 TABLET | Refills: 3 | Status: ON HOLD | OUTPATIENT
Start: 2022-05-27 | End: 2022-10-10 | Stop reason: SDUPTHER

## 2022-05-27 RX ORDER — SPIRONOLACTONE 25 MG/1
25 TABLET ORAL
Qty: 180 TABLET | Refills: 3 | Status: SHIPPED | OUTPATIENT
Start: 2022-05-27 | End: 2022-11-07

## 2022-05-30 ENCOUNTER — CLINICAL SUPPORT (OUTPATIENT)
Dept: INTERNAL MEDICINE | Facility: CLINIC | Age: 87
End: 2022-05-30
Payer: MEDICARE

## 2022-05-30 DIAGNOSIS — J30.9 CHRONIC ALLERGIC RHINITIS: ICD-10-CM

## 2022-05-30 PROCEDURE — 99499 UNLISTED E&M SERVICE: CPT | Mod: S$GLB,,, | Performed by: ALLERGY & IMMUNOLOGY

## 2022-05-30 PROCEDURE — 95115 PR IMMUNOTHERAPY, ONE INJECTION: ICD-10-PCS | Mod: S$GLB,,, | Performed by: STUDENT IN AN ORGANIZED HEALTH CARE EDUCATION/TRAINING PROGRAM

## 2022-05-30 PROCEDURE — 95115 IMMUNOTHERAPY ONE INJECTION: CPT | Mod: S$GLB,,, | Performed by: STUDENT IN AN ORGANIZED HEALTH CARE EDUCATION/TRAINING PROGRAM

## 2022-05-30 PROCEDURE — 99499 NO LOS: ICD-10-PCS | Mod: S$GLB,,, | Performed by: ALLERGY & IMMUNOLOGY

## 2022-06-08 ENCOUNTER — TELEPHONE (OUTPATIENT)
Dept: OPTOMETRY | Facility: CLINIC | Age: 87
End: 2022-06-08
Payer: MEDICARE

## 2022-06-08 NOTE — TELEPHONE ENCOUNTER
----- Message from Funmi Ingram sent at 6/8/2022  4:21 PM CDT -----  Regarding: RE: Fall/Schedule  Contact: Jenniffer Fairbanks I scheduled her. Have a great evening :)  ----- Message -----  From: Lorena Madden  Sent: 6/8/2022   4:18 PM CDT  To: Funmi Juan Jose  Subject: FW: Fall/Schedule                                Hey, she is fine. She is not having any swelling or pain. She said she feels spaced out.. I advised she go to ER for eval and possible CT/MRI but she didn't want to do that so I suggested she at least call her PCP. She does need a f/u with Dr. Hernandez for August though, can you help get her set up for that?   ----- Message -----  From: Funmi Ingram  Sent: 6/8/2022   3:57 PM CDT  To: [facility] Ophthalmology Triage  Subject: FW: Fall/Schedule                                Jose Alfredo Lo can we get her with a triage clinic? Dr. Hernandez is not going to see her for a fall  ----- Message -----  From: Beverly Inman  Sent: 6/8/2022   1:17 PM CDT  To: [facility] Staff  Subject: Fall/Schedule                                    Patient stated she had a fall on 5/7 and wanted to come in and see Dr. Hernandez so he could check her eye. Patient stated her eye is really swollen. Patient call back number is [phone] or [phone]. Patient stated she feel that something is wrong. Please contact this patient.

## 2022-06-09 ENCOUNTER — TELEPHONE (OUTPATIENT)
Dept: INTERNAL MEDICINE | Facility: CLINIC | Age: 87
End: 2022-06-09
Payer: MEDICARE

## 2022-06-09 NOTE — TELEPHONE ENCOUNTER
Pt called on home and mobile numbers. No answer. Left voicemail on home voicemail to call back to advise if she went to the ER, and if so what they said.

## 2022-06-09 NOTE — TELEPHONE ENCOUNTER
----- Message from Nereida Ricks MA sent at 6/9/2022  9:18 AM CDT -----  Regarding: FW: Fall/Schedule  Contact: Jenniffer Jimenez    ----- Message -----  From: Gillian Damon MA  Sent: 6/8/2022   5:23 PM CDT  To: Nereida Ricks MA  Subject: FW: Fall/Schedule                                  ----- Message -----  From: Lorenamaisha Madden  Sent: 6/8/2022   4:35 PM CDT  To: [facility] Staff  Subject: FW: Fall/Schedule                                This pt is having feeling of confusion after a fall on Sunday. I advised she go to ER but she refused. My second advice was for her to contact your office.   ----- Message -----  From: Funmi Ingram  Sent: 6/8/2022   4:22 PM CDT  To: Lorena Madden  Subject: RE: Fall/Schedule                                Gotcha I scheduled her. Have a great evening :)  ----- Message -----  From: Lorena Madden  Sent: 6/8/2022   4:18 PM CDT  To: Funmi Ingram  Subject: FW: Fall/Schedule                                Hey, she is fine. She is not having any swelling or pain. She said she feels spaced out.. I advised she go to ER for eval and possible CT/MRI but she didn't want to do that so I suggested she at least call her PCP. She does need a f/u with Dr. Hernandez for August though, can you help get her set up for that?   ----- Message -----  From: Funmi Ingram  Sent: 6/8/2022   3:57 PM CDT  To: [facility] Ophthalmology Triage  Subject: FW: Fall/Schedule                                Jose Alfredo Lo can we get her with a triage clinic? Dr. Hernandez is not going to see her for a fall  ----- Message -----  From: Beverly Inman  Sent: 6/8/2022   1:17 PM CDT  To: [facility] Staff  Subject: Fall/Schedule                                    Patient stated she had a fall on 5/7 and wanted to come in and see Dr. Hernandez so he could check her eye. Patient stated her eye is really swollen. Patient call back number is [phone] or [phone]. Patient stated she feel that something is wrong.  Please contact this patient.

## 2022-06-27 ENCOUNTER — CLINICAL SUPPORT (OUTPATIENT)
Dept: INTERNAL MEDICINE | Facility: CLINIC | Age: 87
End: 2022-06-27
Payer: MEDICARE

## 2022-06-27 DIAGNOSIS — Z91.09 ALLERGY TO MITES: ICD-10-CM

## 2022-06-27 DIAGNOSIS — J30.1 ALLERGIC RHINITIS DUE TO POLLEN, UNSPECIFIED SEASONALITY: Primary | ICD-10-CM

## 2022-06-27 DIAGNOSIS — Z91.048 ALLERGY TO MOLD: ICD-10-CM

## 2022-06-27 PROCEDURE — 95115 IMMUNOTHERAPY ONE INJECTION: CPT | Mod: S$GLB,,, | Performed by: FAMILY MEDICINE

## 2022-06-27 PROCEDURE — 99999 PR PBB SHADOW E&M-EST. PATIENT-LVL I: CPT | Mod: PBBFAC,,,

## 2022-06-27 PROCEDURE — 99499 UNLISTED E&M SERVICE: CPT | Mod: S$GLB,,, | Performed by: ALLERGY & IMMUNOLOGY

## 2022-06-27 PROCEDURE — 95115 PR IMMUNOTHERAPY, ONE INJECTION: ICD-10-PCS | Mod: S$GLB,,, | Performed by: FAMILY MEDICINE

## 2022-06-27 PROCEDURE — 99499 NO LOS: ICD-10-PCS | Mod: S$GLB,,, | Performed by: ALLERGY & IMMUNOLOGY

## 2022-06-27 PROCEDURE — 99999 PR PBB SHADOW E&M-EST. PATIENT-LVL I: ICD-10-PCS | Mod: PBBFAC,,,

## 2022-07-01 ENCOUNTER — PATIENT MESSAGE (OUTPATIENT)
Dept: CARDIOLOGY | Facility: CLINIC | Age: 87
End: 2022-07-01
Payer: MEDICARE

## 2022-07-07 ENCOUNTER — TELEPHONE (OUTPATIENT)
Dept: INTERNAL MEDICINE | Facility: CLINIC | Age: 87
End: 2022-07-07
Payer: MEDICARE

## 2022-07-07 ENCOUNTER — PATIENT MESSAGE (OUTPATIENT)
Dept: INTERNAL MEDICINE | Facility: CLINIC | Age: 87
End: 2022-07-07
Payer: MEDICARE

## 2022-07-07 DIAGNOSIS — M48.061 SPINAL STENOSIS OF LUMBAR REGION WITHOUT NEUROGENIC CLAUDICATION: Primary | ICD-10-CM

## 2022-07-07 NOTE — TELEPHONE ENCOUNTER
----- Message from Mary Sher sent at 7/7/2022 11:13 AM CDT -----  Contact: [phone]  Pt is requesting a order for a Rolator walker.Pt states the breaks are out on her current walker. Please f/u

## 2022-07-12 ENCOUNTER — PATIENT MESSAGE (OUTPATIENT)
Dept: CARDIOLOGY | Facility: CLINIC | Age: 87
End: 2022-07-12
Payer: MEDICARE

## 2022-07-12 ENCOUNTER — TELEPHONE (OUTPATIENT)
Dept: CARDIOLOGY | Facility: CLINIC | Age: 87
End: 2022-07-12
Payer: MEDICARE

## 2022-07-12 NOTE — TELEPHONE ENCOUNTER
Attempted to contact patient on both numbers.  Did leave VM on home phone, cell phone in box is full.  Patient does not need to have in person visit tomorrow 7/13/22 for Watchman follow up.  Can be done over phone.

## 2022-07-13 ENCOUNTER — OFFICE VISIT (OUTPATIENT)
Dept: CARDIOLOGY | Facility: CLINIC | Age: 87
End: 2022-07-13
Payer: MEDICARE

## 2022-07-13 VITALS
DIASTOLIC BLOOD PRESSURE: 72 MMHG | SYSTOLIC BLOOD PRESSURE: 153 MMHG | OXYGEN SATURATION: 97 % | HEART RATE: 92 BPM | HEIGHT: 65 IN | WEIGHT: 162.69 LBS | BODY MASS INDEX: 27.1 KG/M2

## 2022-07-13 DIAGNOSIS — I50.32 CHRONIC DIASTOLIC HEART FAILURE: Chronic | ICD-10-CM

## 2022-07-13 DIAGNOSIS — I48.20 CHRONIC ATRIAL FIBRILLATION: Chronic | ICD-10-CM

## 2022-07-13 DIAGNOSIS — I70.0 ATHEROSCLEROSIS OF AORTA: Chronic | ICD-10-CM

## 2022-07-13 DIAGNOSIS — N18.32 STAGE 3B CHRONIC KIDNEY DISEASE: ICD-10-CM

## 2022-07-13 PROCEDURE — 1159F MED LIST DOCD IN RCRD: CPT | Mod: CPTII,S$GLB,, | Performed by: PHYSICIAN ASSISTANT

## 2022-07-13 PROCEDURE — 1125F AMNT PAIN NOTED PAIN PRSNT: CPT | Mod: CPTII,S$GLB,, | Performed by: PHYSICIAN ASSISTANT

## 2022-07-13 PROCEDURE — 1125F PR PAIN SEVERITY QUANTIFIED, PAIN PRESENT: ICD-10-PCS | Mod: CPTII,S$GLB,, | Performed by: PHYSICIAN ASSISTANT

## 2022-07-13 PROCEDURE — 99214 OFFICE O/P EST MOD 30 MIN: CPT | Mod: S$GLB,,, | Performed by: PHYSICIAN ASSISTANT

## 2022-07-13 PROCEDURE — 99999 PR PBB SHADOW E&M-EST. PATIENT-LVL III: ICD-10-PCS | Mod: PBBFAC,,, | Performed by: PHYSICIAN ASSISTANT

## 2022-07-13 PROCEDURE — 1159F PR MEDICATION LIST DOCUMENTED IN MEDICAL RECORD: ICD-10-PCS | Mod: CPTII,S$GLB,, | Performed by: PHYSICIAN ASSISTANT

## 2022-07-13 PROCEDURE — 99214 PR OFFICE/OUTPT VISIT, EST, LEVL IV, 30-39 MIN: ICD-10-PCS | Mod: S$GLB,,, | Performed by: PHYSICIAN ASSISTANT

## 2022-07-13 PROCEDURE — 99999 PR PBB SHADOW E&M-EST. PATIENT-LVL III: CPT | Mod: PBBFAC,,, | Performed by: PHYSICIAN ASSISTANT

## 2022-07-13 NOTE — ASSESSMENT & PLAN NOTE
Successful FERNANDA closure with Watchman. SONYA with no nelida-device leak. No need for OAC. Continue ASA indefinitely.

## 2022-07-13 NOTE — PROGRESS NOTES
Subjective:    Patient ID:  Jenniffer Jimenez is a 89 y.o. female who presents for follow-up of Watchman Device    Referring Physician: Dr Adela SPEARS  Jenniffer Jimenez is a 89 y.o. female with chronic Afib, CKD, history of CVA and high risk for falls who presents for follow up s/p Watchman.      Patient initially presented for outpatient Watchman device on 08/05/2020.  On SONYA noted to have thrombus in left atrial appendage when she was on Coumadin.  She was transitioned to Eliquis at that time. She presented on 11/4/20 for Watchman procedure and her SONYA showed resolution of the FERNANDA thrombus. She underwent successful 31 mm Watchman device placement. 45 day follow up SONYA was negative for nelida device leak. Eliquis was stopped at that time. She has also completed her 4.5 month course of Plavix and continues on ASA alone.     She is feeling well overall today. She continues to do 30-60 minutes daily on the stationary bike. She did have a bad fall recently. She has not seen her PCP since.       Review of Systems   Constitutional: Negative for chills and fever.   HENT: Negative for sore throat.    Eyes: Negative for blurred vision.   Cardiovascular: Negative for chest pain, claudication, cyanosis, dyspnea on exertion, irregular heartbeat, leg swelling, near-syncope, orthopnea, palpitations, paroxysmal nocturnal dyspnea and syncope.   Respiratory: Negative for cough and sputum production.    Hematologic/Lymphatic: Does not bruise/bleed easily.   Skin: Negative for itching, rash and suspicious lesions.   Musculoskeletal: Negative for falls.   Gastrointestinal: Negative for abdominal pain and change in bowel habit.   Genitourinary: Negative for dysuria.   Neurological: Negative for disturbances in coordination, dizziness and loss of balance.   Psychiatric/Behavioral: Negative for altered mental status.          Past Medical History:   Diagnosis Date    Amblyopia of left eye 4/10/2013    Anxiety     Arthritis      Facet arthropathy, Lumbosacral    Cataract     Central retinal vein occlusion of left eye     Depression     Diabetic polyneuropathy 1/6/2022    Exotropia of both eyes 2/6/2013    recession RSR 5.0mm w/ adj; recession LR os 5.0 w/ adj; resect MR os  4.0mm    Hearing loss     History of resection of small bowel     Hypertensive retinopathy of both eyes     Hypoglycemia     Macular degeneration     OA (osteoarthritis) of shoulder     Right    Osteoporosis     Posterior vitreous detachment of both eyes     Rhinitis     TIA (transient ischemic attack)        Current Outpatient Medications:     aspirin (ECOTRIN) 81 MG EC tablet, Take 1 tablet (81 mg total) by mouth once daily., Disp: 360 tablet, Rfl: 0    fluticasone propionate (FLONASE) 50 mcg/actuation nasal spray, USE 1 SPRAY IN EACH NOSTRIL ONE TIME DAILY, Disp: 32 g, Rfl: 11    furosemide (LASIX) 20 MG tablet, Take 1 tablet (20 mg total) by mouth 2 (two) times daily before meals., Disp: 180 tablet, Rfl: 3    multivitamin (THERAGRAN) per tablet, Take 1 tablet by mouth once daily., Disp: , Rfl:     pravastatin (PRAVACHOL) 40 MG tablet, Take 1 tablet (40 mg total) by mouth once daily., Disp: 90 tablet, Rfl: 3    psyllium 0.52 gram capsule, Take 9 g by mouth once daily. Pt takes 3 capsules 3 times a day, Disp: , Rfl:     spironolactone (ALDACTONE) 25 MG tablet, Take 1 tablet (25 mg total) by mouth 2 (two) times daily before meals., Disp: 180 tablet, Rfl: 3    vit C/E/Zn/coppr/lutein/zeaxan (OCUVITE LUTEIN AND ZEAXANTHIN ORAL), Take 1 capsule by mouth once daily. Lutien 25 mg, Zeaxanthin 5 mg, Disp: , Rfl:     vitamin E 400 UNIT capsule, Take 400 Units by mouth once daily., Disp: , Rfl:     cholecalciferol, vitamin D3, (VITAMIN D3 ORAL), Take 1 tablet by mouth once daily., Disp: , Rfl:     diclofenac sodium (VOLTAREN) 1 % Gel, Apply 2 g topically 4 (four) times daily. Use gloves to apply for 10 days, Disp: 100 g, Rfl: 1    FLUZONE HIGHDOSE  "QUAD 21-22  mcg/0.7 mL Syrg, , Disp: , Rfl:     Objective:    Physical Exam  Vitals reviewed.   Constitutional:       General: She is not in acute distress.     Appearance: She is well-developed. She is not diaphoretic.   HENT:      Head: Normocephalic and atraumatic.   Neck:      Vascular: No JVD.   Cardiovascular:      Rate and Rhythm: Normal rate and regular rhythm.      Pulses: Intact distal pulses.      Heart sounds: No murmur heard.  Pulmonary:      Effort: Pulmonary effort is normal. No respiratory distress.      Breath sounds: Normal breath sounds.   Musculoskeletal:      Cervical back: Normal range of motion.      Right lower leg: No edema.      Left lower leg: No edema.   Skin:     General: Skin is warm and dry.   Neurological:      Mental Status: She is alert and oriented to person, place, and time.             Vitals:    07/13/22 1423 07/13/22 1426   BP: (!) 151/68 (!) 153/72   BP Location: Left arm Right arm   Patient Position: Sitting Sitting   BP Method: Large (Automatic) Large (Automatic)   Pulse: 87 92   SpO2: 97%    Weight: 73.8 kg (162 lb 11.2 oz)    Height: 5' 5" (1.651 m)      Body mass index is 27.07 kg/m².    Test(s) Reviewed  I have reviewed the following in detail:  [] Stress test   [] Angiography   [] Echocardiogram   [] Labs   [] Other       Chemistry        Component Value Date/Time     12/23/2021 1642    K 4.5 12/23/2021 1642     12/23/2021 1642    CO2 23 12/23/2021 1642    BUN 32 (H) 12/23/2021 1642    CREATININE 1.2 12/23/2021 1642    GLU 94 12/23/2021 1642        Component Value Date/Time    CALCIUM 9.5 12/23/2021 1642    ALKPHOS 110 12/23/2021 1642    AST 17 12/23/2021 1642    ALT 13 12/23/2021 1642    BILITOT 0.6 12/23/2021 1642    ESTGFRAFRICA 46.3 (A) 12/23/2021 1642    EGFRNONAA 40.2 (A) 12/23/2021 1642            Assessment:   Chronic atrial fibrillation  Successful FERNANDA closure with Watchman. SONYA with no nelida-device leak. No need for OAC. Continue ASA " indefinitely.     Chronic diastolic heart failure  NYHA I    Atherosclerosis of aorta  See CT    Stage 3b chronic kidney disease  Stable. Follow up with PCP    Plan:     1. Continue ASA indefinitely.  2. Follow up with Dr. Chapman.   3. SBE prophylaxis for life.              Mercy Trejo PA-C  Valve and Structural Heart Disease  [facility]

## 2022-07-14 ENCOUNTER — TELEPHONE (OUTPATIENT)
Dept: INTERNAL MEDICINE | Facility: CLINIC | Age: 87
End: 2022-07-14
Payer: MEDICARE

## 2022-07-14 NOTE — TELEPHONE ENCOUNTER
----- Message from Ritu Jai sent at 7/14/2022  3:46 PM CDT -----  Regarding: RE: Ritu with [employer]  This is something that is required by the insurance company.  Thank you    ----- Message -----  From: Sagrario Cabrera LPN  Sent: 7/14/2022   3:03 PM CDT  To: Ritu Vergara  Subject: RE: Ritu with [employer]                               The pt already currently uses a rollator walker. Her brakes have worn out. This is a replacement.  Thank you.  ----- Message -----  From: Ritu Vergara  Sent: 7/14/2022   1:07 PM CDT  To: [facility] STEPHENS Staff  Subject: Ritu with [employer]                                   I received an order for a rollator, but there are no clinic notes to indicate the need for a walker.  Please advise.

## 2022-07-19 ENCOUNTER — PATIENT MESSAGE (OUTPATIENT)
Dept: RESEARCH | Facility: CLINIC | Age: 87
End: 2022-07-19
Payer: MEDICARE

## 2022-07-19 NOTE — TELEPHONE ENCOUNTER
Message sent to pt letting her know she needs to schedule an apt before a new rollator can be ordered    I also sent a message to [employer] letting them know that pt must be seen in clinic first

## 2022-07-25 ENCOUNTER — TELEPHONE (OUTPATIENT)
Dept: CARDIOLOGY | Facility: CLINIC | Age: 87
End: 2022-07-25
Payer: MEDICARE

## 2022-07-25 ENCOUNTER — OFFICE VISIT (OUTPATIENT)
Dept: CARDIOLOGY | Facility: CLINIC | Age: 87
End: 2022-07-25
Payer: MEDICARE

## 2022-07-25 ENCOUNTER — PATIENT MESSAGE (OUTPATIENT)
Dept: CARDIOLOGY | Facility: CLINIC | Age: 87
End: 2022-07-25
Payer: MEDICARE

## 2022-07-25 ENCOUNTER — CLINICAL SUPPORT (OUTPATIENT)
Dept: INTERNAL MEDICINE | Facility: CLINIC | Age: 87
End: 2022-07-25
Payer: MEDICARE

## 2022-07-25 VITALS
DIASTOLIC BLOOD PRESSURE: 87 MMHG | WEIGHT: 161.38 LBS | HEART RATE: 80 BPM | HEIGHT: 65 IN | SYSTOLIC BLOOD PRESSURE: 127 MMHG | BODY MASS INDEX: 26.89 KG/M2

## 2022-07-25 DIAGNOSIS — I10 PRIMARY HYPERTENSION: Chronic | ICD-10-CM

## 2022-07-25 DIAGNOSIS — J30.1 ALLERGIC RHINITIS DUE TO POLLEN, UNSPECIFIED SEASONALITY: Primary | ICD-10-CM

## 2022-07-25 DIAGNOSIS — I50.32 CHRONIC DIASTOLIC HEART FAILURE: Chronic | ICD-10-CM

## 2022-07-25 DIAGNOSIS — I70.0 ATHEROSCLEROSIS OF AORTA: Chronic | ICD-10-CM

## 2022-07-25 DIAGNOSIS — I48.20 CHRONIC ATRIAL FIBRILLATION: Primary | Chronic | ICD-10-CM

## 2022-07-25 DIAGNOSIS — Z95.818 PRESENCE OF WATCHMAN LEFT ATRIAL APPENDAGE CLOSURE DEVICE: Chronic | ICD-10-CM

## 2022-07-25 DIAGNOSIS — Z91.09 ALLERGY TO MITES: ICD-10-CM

## 2022-07-25 DIAGNOSIS — Z91.048 ALLERGY TO MOLD: ICD-10-CM

## 2022-07-25 PROBLEM — Q20.8 ABNORMALITY OF LEFT ATRIAL APPENDAGE: Status: RESOLVED | Noted: 2020-12-15 | Resolved: 2022-07-25

## 2022-07-25 PROCEDURE — 3288F FALL RISK ASSESSMENT DOCD: CPT | Mod: CPTII,S$GLB,, | Performed by: INTERNAL MEDICINE

## 2022-07-25 PROCEDURE — 1101F PR PT FALLS ASSESS DOC 0-1 FALLS W/OUT INJ PAST YR: ICD-10-PCS | Mod: CPTII,S$GLB,, | Performed by: INTERNAL MEDICINE

## 2022-07-25 PROCEDURE — 1160F RVW MEDS BY RX/DR IN RCRD: CPT | Mod: CPTII,S$GLB,, | Performed by: INTERNAL MEDICINE

## 2022-07-25 PROCEDURE — 1160F PR REVIEW ALL MEDS BY PRESCRIBER/CLIN PHARMACIST DOCUMENTED: ICD-10-PCS | Mod: CPTII,S$GLB,, | Performed by: INTERNAL MEDICINE

## 2022-07-25 PROCEDURE — 99214 PR OFFICE/OUTPT VISIT, EST, LEVL IV, 30-39 MIN: ICD-10-PCS | Mod: S$GLB,,, | Performed by: INTERNAL MEDICINE

## 2022-07-25 PROCEDURE — 99499 UNLISTED E&M SERVICE: CPT | Mod: S$GLB,,, | Performed by: ALLERGY & IMMUNOLOGY

## 2022-07-25 PROCEDURE — 99214 OFFICE O/P EST MOD 30 MIN: CPT | Mod: S$GLB,,, | Performed by: INTERNAL MEDICINE

## 2022-07-25 PROCEDURE — 3288F PR FALLS RISK ASSESSMENT DOCUMENTED: ICD-10-PCS | Mod: CPTII,S$GLB,, | Performed by: INTERNAL MEDICINE

## 2022-07-25 PROCEDURE — 1126F AMNT PAIN NOTED NONE PRSNT: CPT | Mod: CPTII,S$GLB,, | Performed by: INTERNAL MEDICINE

## 2022-07-25 PROCEDURE — 95115 IMMUNOTHERAPY ONE INJECTION: CPT | Mod: S$GLB,,, | Performed by: FAMILY MEDICINE

## 2022-07-25 PROCEDURE — 1159F MED LIST DOCD IN RCRD: CPT | Mod: CPTII,S$GLB,, | Performed by: INTERNAL MEDICINE

## 2022-07-25 PROCEDURE — 1101F PT FALLS ASSESS-DOCD LE1/YR: CPT | Mod: CPTII,S$GLB,, | Performed by: INTERNAL MEDICINE

## 2022-07-25 PROCEDURE — 1126F PR PAIN SEVERITY QUANTIFIED, NO PAIN PRESENT: ICD-10-PCS | Mod: CPTII,S$GLB,, | Performed by: INTERNAL MEDICINE

## 2022-07-25 PROCEDURE — 1159F PR MEDICATION LIST DOCUMENTED IN MEDICAL RECORD: ICD-10-PCS | Mod: CPTII,S$GLB,, | Performed by: INTERNAL MEDICINE

## 2022-07-25 PROCEDURE — 99999 PR PBB SHADOW E&M-EST. PATIENT-LVL III: ICD-10-PCS | Mod: PBBFAC,,, | Performed by: INTERNAL MEDICINE

## 2022-07-25 PROCEDURE — 95115 PR IMMUNOTHERAPY, ONE INJECTION: ICD-10-PCS | Mod: S$GLB,,, | Performed by: FAMILY MEDICINE

## 2022-07-25 PROCEDURE — 99999 PR PBB SHADOW E&M-EST. PATIENT-LVL III: CPT | Mod: PBBFAC,,, | Performed by: INTERNAL MEDICINE

## 2022-07-25 PROCEDURE — 99499 NO LOS: ICD-10-PCS | Mod: S$GLB,,, | Performed by: ALLERGY & IMMUNOLOGY

## 2022-07-25 NOTE — TELEPHONE ENCOUNTER
Called patient to attempt to clarify SBE prophylaxis. She cannot hear over the phone. Will attempt to send a patient message.

## 2022-07-25 NOTE — PROGRESS NOTES
Subjective:   07/25/2022     Patient ID:  Jenniffer Jimenez is a 89 y.o. female who presents for evaulation of Congestive Heart Failure and Hypertension      She comes in for cardiac follow-up.  She has not had increasing chest pains or tightness, shortness of breath, PND or orthopnea.  She is status placement of a left atrial appendage occluder 2021, November.  Recently followed up in [facility], note made that she should take SBE prophylaxis for life, she has not been doing that.    She has previous had diastolic heart failure, shortness of breath not increasing, she does take additional furosemide and spironolactone as needed for swelling.      Chronic atrial fibrillation is present, currently off of anticoagulation, only on aspirin.  No bleeding problems.        Prior note:    She presented for evaluation of chest pain in February of 2021.  She had noted increasing swelling in her legs.  She had taken additional furosemide.  She is status post Watchman device placement in December of 2020.  She is off anticoagulation, now on aspirin only.  She has not had bleeding problems.  She does not have a history of chest pain.  Cardiac catheterization performed many years ago was normal.  Echocardiography continue show evidence for diastolic dysfunction with increased PA pressures and increased left atrial size.  She has not had increasing shortness of breath.  Her weight has been stable, taking additional furosemide and aldosterone is needed.  Laboratory work recently has shown stable renal to and potassium.     Hypercholesterolemia is on present, on moderate dose statin therapy.  Prescription refilled     She does have chronic atrial fibrillation is status post Watchman device as noted above.  Her heart rate is well controlled.     She was felt to have acute on chronic chronic diastolic heart failure.  My recommendations then were:  1.  Discontinue potassium chloride  2.  Take furosemide 20 mg twice a day  to get rid of fluid, decrease to once a day when fluid improved.  3.  Take spironolactone 25 mg 1 with each furosemide.  4.  Call me if you have recurrent chest pain.  5.  Weigh yourself daily.  Record this and bring it in with your next visit     I saw her 2 weeks after the initial presentation.  She was markedly improved.  Recommendations then were:    1.  Decrease Lasix/furosemide and Aldactone/spironolactone to 1 a day as long as weight stays below 156 lb, okay to increase to twice a day for increased weight.  2.  Please do a blood test for me today to check kidney function.    She developed an episode of lightheadedness and her weight target was changed to 160lb.      Echocardiogram from January 2022:  · The left ventricle is normal in size with normal systolic function.  · The estimated ejection fraction is 65%.  · Severe left atrial enlargement.  · Grade III left ventricular diastolic dysfunction.  · The estimated PA systolic pressure is 51 mmHg.  · Normal right ventricular size with normal right ventricular systolic function.  · There is mild pulmonary hypertension.  · Intermediate central venous pressure (8 mmHg).  · Moderate right atrial enlargement.  · Moderate tricuspid regurgitation.  · Mild mitral regurgitation.       Recent carotid ultrasound shows mild bilateral disease:  There is 20-39% right Internal Carotid Stenosis.  There is 40-49% left Internal Carotid Stenosis.      Hyperlipidemia  Pertinent negatives include no chest pain.   Congestive Heart Failure  Pertinent negatives include no chest pain, claudication, near-syncope or palpitations.   Hypertension  Associated symptoms include headaches. Pertinent negatives include no chest pain, orthopnea, palpitations or PND.       Patient Active Problem List   Diagnosis    Pure hypercholesterolemia    Pseudophakia, both eyes    Stable central retinal vein occlusion of left eye    Chronic rhinitis    Hearing loss, sensorineural    Primary  "hypertension    Arthritis    Exotropia of both eyes    Gait instability    Meniere's disease    Facet arthropathy, lumbosacral    Lumbar spinal stenosis    Hypermetropia of right eye    Chronic atrial fibrillation    Stage 3b chronic kidney disease    Atherosclerosis of aorta    Chronic diastolic heart failure    History of TIA (transient ischemic attack)    Osteoporosis    Primary osteoarthritis of right shoulder    History of strabismus surgery    Encounter for long-term (current) use of antiplatelets/antithrombotics    Prediabetes    Refractive error    Posterior vitreous detachment, bilateral    Dry eye syndrome    Overweight (BMI 25.0-29.9)    Risk for falls    OAB (overactive bladder)    Venous insufficiency of both lower extremities    BRVO (branch retinal vein occlusion)    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 eye with active choroidal neovascularization    Hypertensive retinopathy of both eyes    Unspecified inflammatory spondylopathy, lumbosacral region    Presence of Watchman left atrial appendage closure device    Normocytic anemia    Cervicogenic headache    Diabetic polyneuropathy    Senile purpura    Skin tear of forearm without complication, left, initial encounter    Tear of left supraspinatus tendon    Weakness of shoulder    Impaired function of upper extremity        Review of patient's allergies indicates:   Allergen Reactions    Opioids - morphine analogues Other (See Comments)     Bowel issues; bowel obstruction    Tizanidine Other (See Comments)     "Lips were numb,  Almost passed out."    Tramadol Hallucinations    Beta-blockers (beta-adrenergic blocking agts) Other (See Comments)     Can not go on beta blockers for long period of time - due to taking allergy injections    Morphine     Opioids-meperidine and related          Current Outpatient Medications:     aspirin (ECOTRIN) 81 MG EC tablet, Take 1 tablet (81 mg total) by mouth " once daily., Disp: 360 tablet, Rfl: 0    cholecalciferol, vitamin D3, (VITAMIN D3 ORAL), Take 1 tablet by mouth once daily., Disp: , Rfl:     fluticasone propionate (FLONASE) 50 mcg/actuation nasal spray, USE 1 SPRAY IN EACH NOSTRIL ONE TIME DAILY, Disp: 32 g, Rfl: 11    furosemide (LASIX) 20 MG tablet, Take 1 tablet (20 mg total) by mouth 2 (two) times daily before meals., Disp: 180 tablet, Rfl: 3    pravastatin (PRAVACHOL) 40 MG tablet, Take 1 tablet (40 mg total) by mouth once daily., Disp: 90 tablet, Rfl: 3    psyllium 0.52 gram capsule, Take 9 g by mouth once daily. Pt takes 3 capsules 3 times a day, Disp: , Rfl:     spironolactone (ALDACTONE) 25 MG tablet, Take 1 tablet (25 mg total) by mouth 2 (two) times daily before meals., Disp: 180 tablet, Rfl: 3    vit C/E/Zn/coppr/lutein/zeaxan (OCUVITE LUTEIN AND ZEAXANTHIN ORAL), Take 1 capsule by mouth once daily. Lutien 25 mg, Zeaxanthin 5 mg, Disp: , Rfl:     vitamin E 400 UNIT capsule, Take 400 Units by mouth once daily., Disp: , Rfl:     diclofenac sodium (VOLTAREN) 1 % Gel, Apply 2 g topically 4 (four) times daily. Use gloves to apply for 10 days, Disp: 100 g, Rfl: 1    FLUZONE HIGHDOSE QUAD 21-22  mcg/0.7 mL Syrg, , Disp: , Rfl:      Objective:   Review of Systems   Cardiovascular: Negative for chest pain, claudication, cyanosis, dyspnea on exertion, irregular heartbeat, leg swelling, near-syncope, orthopnea, palpitations, paroxysmal nocturnal dyspnea and syncope.   Musculoskeletal: Positive for arthritis and muscle cramps.   Gastrointestinal: Positive for constipation and diarrhea.   Neurological: Positive for headaches, loss of balance, numbness and paresthesias.       Vitals:    07/25/22 1332   BP: 127/87   Pulse: 80       Physical Exam  Vitals reviewed.   Constitutional:       General: She is not in acute distress.     Appearance: She is well-developed.   HENT:      Head: Normocephalic and atraumatic.      Nose: Nose normal.   Eyes:       Conjunctiva/sclera: Conjunctivae normal.      Pupils: Pupils are equal, round, and reactive to light.   Neck:      Vascular: No JVD.   Cardiovascular:      Rate and Rhythm: Normal rate and regular rhythm.      Pulses: Intact distal pulses.      Heart sounds: Normal heart sounds. No murmur heard.    No friction rub. No gallop.      Comments: Jugular venous pressure is normal.  No edema  Pulmonary:      Effort: Pulmonary effort is normal. No respiratory distress.      Breath sounds: Normal breath sounds. No wheezing or rales.   Chest:      Chest wall: No tenderness.   Abdominal:      General: Bowel sounds are normal. There is no distension.      Palpations: Abdomen is soft.      Tenderness: There is no abdominal tenderness.   Musculoskeletal:         General: No tenderness or deformity. Normal range of motion.      Cervical back: Normal range of motion and neck supple.      Right lower leg: Edema (1+ bilateral edema) present.      Left lower leg: Edema present.   Skin:     General: Skin is warm and dry.      Findings: No erythema or rash.   Neurological:      Mental Status: She is alert and oriented to person, place, and time.      Cranial Nerves: No cranial nerve deficit.      Motor: No abnormal muscle tone.      Coordination: Coordination normal.   Psychiatric:         Behavior: Behavior normal.         Thought Content: Thought content normal.         Judgment: Judgment normal.       Component      Latest Ref Rng & Units 12/23/2021 11/11/2021 9/30/2021   WBC      3.90 - 12.70 K/uL 6.14  3.19 (L)   RBC      4.00 - 5.40 M/uL 3.75 (L)  3.60 (L)   Hemoglobin      12.0 - 16.0 g/dL 11.2 (L)  10.7 (L)   Hematocrit      37.0 - 48.5 % 34.3 (L)  32.8 (L)   MCV      82 - 98 fL 92  91   MCH      27.0 - 31.0 pg 29.9  29.7   MCHC      32.0 - 36.0 g/dL 32.7  32.6   RDW      11.5 - 14.5 % 15.0 (H)  15.1 (H)   Platelets      150 - 450 K/uL 212  192   MPV      9.2 - 12.9 fL 9.8  10.4   Immature Granulocytes      0.0 - 0.5 % 0.3  0.3    Gran # (ANC)      1.8 - 7.7 K/uL 4.8  2.0   Immature Grans (Abs)      0.00 - 0.04 K/uL 0.02  0.01   Lymph #      1.0 - 4.8 K/uL 0.9 (L)  0.8 (L)   Mono #      0.3 - 1.0 K/uL 0.4  0.3   Eos #      0.0 - 0.5 K/uL 0.1  0.1   Baso #      0.00 - 0.20 K/uL 0.03  0.02   nRBC      0 /100 WBC 0  0   Gran %      38.0 - 73.0 % 77.8 (H)  62.4   Lymph %      18.0 - 48.0 % 13.8 (L)  25.4   Mono %      4.0 - 15.0 % 6.8  9.4   Eosinophil %      0.0 - 8.0 % 0.8  1.9   Basophil %      0.0 - 1.9 % 0.5  0.6   Differential Method       Automated  Automated   Sodium      136 - 145 mmol/L 139 140 133 (L)   Potassium      3.5 - 5.1 mmol/L 4.5 4.5 4.2   Chloride      95 - 110 mmol/L 104 102 99   CO2      23 - 29 mmol/L 23 24 21 (L)   Glucose      70 - 110 mg/dL 94 102 94   BUN      8 - 23 mg/dL 32 (H) 19 25 (H)   Creatinine      0.5 - 1.4 mg/dL 1.2 1.4 1.1   Calcium      8.7 - 10.5 mg/dL 9.5 9.7 9.2   PROTEIN TOTAL      6.0 - 8.4 g/dL 7.4 7.2    Albumin      3.5 - 5.2 g/dL 4.2 4.0    BILIRUBIN TOTAL      0.1 - 1.0 mg/dL 0.6 0.6    Alkaline Phosphatase      55 - 135 U/L 110 116    AST      10 - 40 U/L 17 22    ALT      10 - 44 U/L 13 17    Anion Gap      8 - 16 mmol/L 12 14 13   eGFR if African American      >60 mL/min/1.73 m:2 46.3 (A) 38.4 (A) 51.4 (A)   eGFR if non African American      >60 mL/min/1.73 m:2 40.2 (A) 33.3 (A) 44.6 (A)   Microalbumin, Urine      ug/mL  <5.0    Creatinine, Urine      15.0 - 325.0 mg/dL  22.0    MICROALB/CREAT RATIO      0.0 - 30.0 ug/mg  Unable to calculate    Hemoglobin A1C External      4.0 - 5.6 %   5.7 (H)   Estimated Avg Glucose      68 - 131 mg/dL   117   Vitamin B-12      210 - 950 pg/mL  398    Vit D, 25-Hydroxy      30 - 96 ng/mL  43    Magnesium      1.6 - 2.6 mg/dL  2.1    CRP      0.0 - 8.2 mg/L 0.7     Sed Rate      0 - 36 mm/Hr 27     Uric Acid      2.4 - 5.7 mg/dL 7.9 (H)            Assessment and Plan:     Chronic atrial fibrillation  Comments:  Rate controlled    Chronic diastolic heart  failure  Comments:  Appears compensated    Presence of Watchman left atrial appendage closure device  Comments:  Check with Interventional Clinic about SBE prophylaxis    Primary hypertension  Comments:  Blood pressure well controlled    Atherosclerosis of aorta    To see Dr. Mccoy soon, lab at that time.      Follow up in about 6 months (around 1/25/2023).

## 2022-08-03 NOTE — TELEPHONE ENCOUNTER
----- Message from Zev Ashton sent at 7/13/2020  1:38 PM CDT -----  Regarding: Advice  Contact: Patient [phone]  Patient would like to get medical advice.    Comments: Patient stating has been feeling cold a lot and today fever reading where 100.2 and 100.4, not sure if has to do with Covid, would like a call back to discuss.    Also got over to nurse.    Please call an advise  Thank you     Hide Additional Notes?: No Detail Level: Zone

## 2022-08-05 ENCOUNTER — OFFICE VISIT (OUTPATIENT)
Dept: OPHTHALMOLOGY | Facility: CLINIC | Age: 87
End: 2022-08-05
Payer: MEDICARE

## 2022-08-05 DIAGNOSIS — H35.3221 EXUDATIVE AGE-RELATED MACULAR DEGENERATION OF LEFT EYE WITH ACTIVE CHOROIDAL NEOVASCULARIZATION: Primary | ICD-10-CM

## 2022-08-05 DIAGNOSIS — H35.033 HYPERTENSIVE RETINOPATHY OF BOTH EYES: ICD-10-CM

## 2022-08-05 DIAGNOSIS — H34.8122 STABLE CENTRAL RETINAL VEIN OCCLUSION OF LEFT EYE: ICD-10-CM

## 2022-08-05 DIAGNOSIS — H43.813 POSTERIOR VITREOUS DETACHMENT, BILATERAL: ICD-10-CM

## 2022-08-05 PROCEDURE — 92201 OPSCPY EXTND RTA DRAW UNI/BI: CPT | Mod: S$GLB,,, | Performed by: OPHTHALMOLOGY

## 2022-08-05 PROCEDURE — 1101F PT FALLS ASSESS-DOCD LE1/YR: CPT | Mod: CPTII,S$GLB,, | Performed by: OPHTHALMOLOGY

## 2022-08-05 PROCEDURE — 92201 PR OPHTHALMOSCOPY, EXT, W/RET DRAW/SCLERAL DEPR, I&R, UNI/BI: ICD-10-PCS | Mod: S$GLB,,, | Performed by: OPHTHALMOLOGY

## 2022-08-05 PROCEDURE — 1160F PR REVIEW ALL MEDS BY PRESCRIBER/CLIN PHARMACIST DOCUMENTED: ICD-10-PCS | Mod: CPTII,S$GLB,, | Performed by: OPHTHALMOLOGY

## 2022-08-05 PROCEDURE — 1126F PR PAIN SEVERITY QUANTIFIED, NO PAIN PRESENT: ICD-10-PCS | Mod: CPTII,S$GLB,, | Performed by: OPHTHALMOLOGY

## 2022-08-05 PROCEDURE — 92014 PR EYE EXAM, EST PATIENT,COMPREHESV: ICD-10-PCS | Mod: S$GLB,,, | Performed by: OPHTHALMOLOGY

## 2022-08-05 PROCEDURE — 3288F FALL RISK ASSESSMENT DOCD: CPT | Mod: CPTII,S$GLB,, | Performed by: OPHTHALMOLOGY

## 2022-08-05 PROCEDURE — 1160F RVW MEDS BY RX/DR IN RCRD: CPT | Mod: CPTII,S$GLB,, | Performed by: OPHTHALMOLOGY

## 2022-08-05 PROCEDURE — 1159F PR MEDICATION LIST DOCUMENTED IN MEDICAL RECORD: ICD-10-PCS | Mod: CPTII,S$GLB,, | Performed by: OPHTHALMOLOGY

## 2022-08-05 PROCEDURE — 1101F PR PT FALLS ASSESS DOC 0-1 FALLS W/OUT INJ PAST YR: ICD-10-PCS | Mod: CPTII,S$GLB,, | Performed by: OPHTHALMOLOGY

## 2022-08-05 PROCEDURE — 92014 COMPRE OPH EXAM EST PT 1/>: CPT | Mod: S$GLB,,, | Performed by: OPHTHALMOLOGY

## 2022-08-05 PROCEDURE — 1126F AMNT PAIN NOTED NONE PRSNT: CPT | Mod: CPTII,S$GLB,, | Performed by: OPHTHALMOLOGY

## 2022-08-05 PROCEDURE — 99999 PR PBB SHADOW E&M-EST. PATIENT-LVL III: CPT | Mod: PBBFAC,,, | Performed by: OPHTHALMOLOGY

## 2022-08-05 PROCEDURE — 3288F PR FALLS RISK ASSESSMENT DOCUMENTED: ICD-10-PCS | Mod: CPTII,S$GLB,, | Performed by: OPHTHALMOLOGY

## 2022-08-05 PROCEDURE — 1159F MED LIST DOCD IN RCRD: CPT | Mod: CPTII,S$GLB,, | Performed by: OPHTHALMOLOGY

## 2022-08-05 PROCEDURE — 92134 CPTRZ OPH DX IMG PST SGM RTA: CPT | Mod: S$GLB,,, | Performed by: OPHTHALMOLOGY

## 2022-08-05 PROCEDURE — 99999 PR PBB SHADOW E&M-EST. PATIENT-LVL III: ICD-10-PCS | Mod: PBBFAC,,, | Performed by: OPHTHALMOLOGY

## 2022-08-05 PROCEDURE — 92134 POSTERIOR SEGMENT OCT RETINA (OCULAR COHERENCE TOMOGRAPHY)-BOTH EYES: ICD-10-PCS | Mod: S$GLB,,, | Performed by: OPHTHALMOLOGY

## 2022-08-05 NOTE — PROGRESS NOTES
HPI     Patient here today for yearly AMD check. Patient states VA has decreased   since last visit OS > OD, no pain or f/f. Patient does report having a   fall since last visit, hitting her head against the wall causing VA   changes OS.    Systane ou prn    Last edited by Funmi Ingram on 8/5/2022  9:25 AM. (History)            OCT - OD - no ME  OS - SRF resolved      A/P    1. Wet AMD OS  PPCNVM  S/p Avastin OS x 6 (last 12/20)    Stable - continue obs    2. Prior CRVO OS  Stable    3. PCIOL OU    4. S/p strab sx    5. HTN Ret OU  BP control      12 months  OCT and dilate

## 2022-08-09 ENCOUNTER — TELEPHONE (OUTPATIENT)
Dept: INTERNAL MEDICINE | Facility: CLINIC | Age: 87
End: 2022-08-09
Payer: MEDICARE

## 2022-08-09 DIAGNOSIS — R53.83 FATIGUE, UNSPECIFIED TYPE: ICD-10-CM

## 2022-08-09 DIAGNOSIS — R73.03 PREDIABETES: ICD-10-CM

## 2022-08-09 DIAGNOSIS — I10 PRIMARY HYPERTENSION: ICD-10-CM

## 2022-08-09 DIAGNOSIS — Z00.00 ANNUAL PHYSICAL EXAM: Primary | ICD-10-CM

## 2022-08-09 NOTE — TELEPHONE ENCOUNTER
----- Message from Steff Wilson sent at 8/9/2022  2:42 PM CDT -----  Contact: Self/[phone]  type: Lab    Caller is requesting to schedule their Lab appointment prior to annual appointment.  Order is not listed in EPIC.  Please enter order and contact patient to schedule.    Name of Caller:[place]     Preferred Date and Time of Labs:9/20 8:00 am     Date of Annual Physical Appointment:9/20     Where would they like the lab performed?[facility] [place]     Would the patient rather a call back or a response via [facility] [employer]? Call back     Best Call Back Number:[phone]    Additional Information:na

## 2022-08-22 ENCOUNTER — CLINICAL SUPPORT (OUTPATIENT)
Dept: INTERNAL MEDICINE | Facility: CLINIC | Age: 87
End: 2022-08-22
Payer: MEDICARE

## 2022-08-22 DIAGNOSIS — Z91.048 ALLERGY TO MOLD: ICD-10-CM

## 2022-08-22 DIAGNOSIS — J30.1 ALLERGIC RHINITIS DUE TO POLLEN, UNSPECIFIED SEASONALITY: Primary | ICD-10-CM

## 2022-08-22 DIAGNOSIS — Z91.09 ALLERGY TO MITES: ICD-10-CM

## 2022-08-22 PROCEDURE — 95115 IMMUNOTHERAPY ONE INJECTION: CPT | Mod: S$GLB,,, | Performed by: FAMILY MEDICINE

## 2022-08-22 PROCEDURE — 99499 UNLISTED E&M SERVICE: CPT | Mod: S$GLB,,, | Performed by: ALLERGY & IMMUNOLOGY

## 2022-08-22 PROCEDURE — 99499 NO LOS: ICD-10-PCS | Mod: S$GLB,,, | Performed by: ALLERGY & IMMUNOLOGY

## 2022-08-22 PROCEDURE — 95115 PR IMMUNOTHERAPY, ONE INJECTION: ICD-10-PCS | Mod: S$GLB,,, | Performed by: FAMILY MEDICINE

## 2022-08-23 ENCOUNTER — OFFICE VISIT (OUTPATIENT)
Dept: INTERNAL MEDICINE | Facility: CLINIC | Age: 87
End: 2022-08-23
Payer: MEDICARE

## 2022-08-23 ENCOUNTER — HOSPITAL ENCOUNTER (OUTPATIENT)
Dept: RADIOLOGY | Facility: HOSPITAL | Age: 87
Discharge: HOME | End: 2022-08-23
Attending: INTERNAL MEDICINE
Payer: MEDICARE

## 2022-08-23 VITALS
RESPIRATION RATE: 24 BRPM | SYSTOLIC BLOOD PRESSURE: 116 MMHG | HEART RATE: 78 BPM | BODY MASS INDEX: 26.29 KG/M2 | OXYGEN SATURATION: 98 % | TEMPERATURE: 98 F | DIASTOLIC BLOOD PRESSURE: 50 MMHG | WEIGHT: 163.56 LBS | HEIGHT: 66 IN

## 2022-08-23 DIAGNOSIS — J01.90 ACUTE BACTERIAL SINUSITIS: Primary | ICD-10-CM

## 2022-08-23 DIAGNOSIS — B96.89 ACUTE BACTERIAL SINUSITIS: Primary | ICD-10-CM

## 2022-08-23 DIAGNOSIS — R05.9 COUGH: ICD-10-CM

## 2022-08-23 PROCEDURE — 1160F RVW MEDS BY RX/DR IN RCRD: CPT | Mod: CPTII,S$GLB,, | Performed by: INTERNAL MEDICINE

## 2022-08-23 PROCEDURE — 99213 OFFICE O/P EST LOW 20 MIN: CPT | Mod: S$GLB,,, | Performed by: INTERNAL MEDICINE

## 2022-08-23 PROCEDURE — 71046 X-RAY EXAM CHEST 2 VIEWS: CPT | Mod: TC,PO

## 2022-08-23 PROCEDURE — 1126F AMNT PAIN NOTED NONE PRSNT: CPT | Mod: CPTII,S$GLB,, | Performed by: INTERNAL MEDICINE

## 2022-08-23 PROCEDURE — 71046 X-RAY EXAM CHEST 2 VIEWS: CPT | Mod: 26,,, | Performed by: RADIOLOGY

## 2022-08-23 PROCEDURE — 99213 PR OFFICE/OUTPT VISIT, EST, LEVL III, 20-29 MIN: ICD-10-PCS | Mod: S$GLB,,, | Performed by: INTERNAL MEDICINE

## 2022-08-23 PROCEDURE — 1160F PR REVIEW ALL MEDS BY PRESCRIBER/CLIN PHARMACIST DOCUMENTED: ICD-10-PCS | Mod: CPTII,S$GLB,, | Performed by: INTERNAL MEDICINE

## 2022-08-23 PROCEDURE — 99999 PR PBB SHADOW E&M-EST. PATIENT-LVL V: ICD-10-PCS | Mod: PBBFAC,,, | Performed by: INTERNAL MEDICINE

## 2022-08-23 PROCEDURE — 71046 XR CHEST PA AND LATERAL: ICD-10-PCS | Mod: 26,,, | Performed by: RADIOLOGY

## 2022-08-23 PROCEDURE — 1159F MED LIST DOCD IN RCRD: CPT | Mod: CPTII,S$GLB,, | Performed by: INTERNAL MEDICINE

## 2022-08-23 PROCEDURE — 1159F PR MEDICATION LIST DOCUMENTED IN MEDICAL RECORD: ICD-10-PCS | Mod: CPTII,S$GLB,, | Performed by: INTERNAL MEDICINE

## 2022-08-23 PROCEDURE — 1126F PR PAIN SEVERITY QUANTIFIED, NO PAIN PRESENT: ICD-10-PCS | Mod: CPTII,S$GLB,, | Performed by: INTERNAL MEDICINE

## 2022-08-23 PROCEDURE — 99999 PR PBB SHADOW E&M-EST. PATIENT-LVL V: CPT | Mod: PBBFAC,,, | Performed by: INTERNAL MEDICINE

## 2022-08-23 RX ORDER — BENZONATATE 200 MG/1
200 CAPSULE ORAL 3 TIMES DAILY PRN
Qty: 30 CAPSULE | Refills: 0 | Status: SHIPPED | OUTPATIENT
Start: 2022-08-23 | End: 2022-09-02

## 2022-08-23 RX ORDER — DOXYCYCLINE 100 MG/1
100 CAPSULE ORAL EVERY 12 HOURS
Qty: 10 CAPSULE | Refills: 0 | Status: SHIPPED | OUTPATIENT
Start: 2022-08-23 | End: 2022-08-28

## 2022-08-23 RX ORDER — AZELASTINE 1 MG/ML
1 SPRAY, METERED NASAL 2 TIMES DAILY
Qty: 30 ML | Refills: 1 | Status: ON HOLD | OUTPATIENT
Start: 2022-08-23 | End: 2023-01-29 | Stop reason: HOSPADM

## 2022-08-23 NOTE — PATIENT INSTRUCTIONS
Saline nasal spray every 4 hours as needed     2. Guaifenesin (Robitussin or Mucinex) as needed to help thin the mucus

## 2022-08-23 NOTE — PROGRESS NOTES
Subjective:     Jenniffer Jimenez is a 89 y.o. female who presents for   Chief Complaint   Patient presents with    Hemoptysis     Pt has been congested, post nasal drip and coughing for weeks.  Getting worse  Yesterday and this morning, brown blood with a string of red. No fever.         HPI     Sinusitis: The patient presents for evaluation of sinus pain. Symptoms include: congestion, cough, facial pain, frequent clearing of the throat, nasal congestion, post nasal drip, sinus pressure and spitting/vomiting mucous. Onset of symptoms was 1-2 weeks ago. Symptoms have been unchanged since that time. Past history is significant for recurrent sinus infections. Patient is a former smoker and has history of seasonal allergies. She coughs up mucus that is very thick and has streaks of blood. She reports mild SOB and mucus sometimes gets caught in her chest and causes discomfort. She denies fever and has no chills. She reports episodes of sweats for 2 days but this has resolved.  She exercises regularly but symptoms have not stopped her from exercising.       Review of Systems   Constitutional: Positive for diaphoresis (last few weeks). Negative for chills, fatigue and fever.   HENT: Positive for congestion, hearing loss (wears hearing aid), nosebleeds, postnasal drip, rhinorrhea and sinus pressure. Negative for ear discharge, ear pain, sneezing and sore throat.         She reports spitting up blood   Eyes: Negative for discharge and redness.   Respiratory: Positive for cough. Negative for chest tightness and shortness of breath.    Cardiovascular: Negative for chest pain, palpitations and leg swelling.   Gastrointestinal: Negative for abdominal pain, constipation, diarrhea and vomiting.   Musculoskeletal: Positive for gait problem (uses rollator). Negative for myalgias and neck pain.   Skin: Negative for rash and wound.   Allergic/Immunologic: Positive for environmental allergies.   Neurological: Negative for  dizziness, tremors and headaches.   Hematological: Negative for adenopathy.          Objective:     Physical Exam  Vitals reviewed.   Constitutional:       General: She is awake. She is not in acute distress.     Appearance: Normal appearance. She is well-developed and well-groomed.   HENT:      Head: Normocephalic and atraumatic.      Right Ear: Tympanic membrane, ear canal and external ear normal. Decreased hearing noted. Tympanic membrane is not erythematous or bulging.      Left Ear: Tympanic membrane, ear canal and external ear normal. Decreased hearing noted. Tympanic membrane is not erythematous or bulging.      Nose: Mucosal edema, congestion and rhinorrhea present. Rhinorrhea is clear.      Right Sinus: Maxillary sinus tenderness present. No frontal sinus tenderness.      Left Sinus: Maxillary sinus tenderness present. No frontal sinus tenderness.      Mouth/Throat:      Mouth: Mucous membranes are moist.   Eyes:      General: Lids are normal. Vision grossly intact.   Cardiovascular:      Rate and Rhythm: Normal rate and regular rhythm.      Heart sounds: Normal heart sounds. No murmur heard.  Pulmonary:      Effort: Pulmonary effort is normal. No tachypnea or bradypnea.      Breath sounds: Normal breath sounds. No decreased breath sounds or wheezing.   Abdominal:      General: Bowel sounds are normal. There is no distension.   Musculoskeletal:         General: Normal range of motion.      Cervical back: Normal range of motion.      Right lower leg: No edema.      Left lower leg: No edema.   Skin:     General: Skin is warm and dry.      Findings: No lesion or rash.   Neurological:      Mental Status: She is alert and oriented to person, place, and time.      Gait: Gait abnormal.   Psychiatric:         Attention and Perception: Attention normal.         Mood and Affect: Mood normal.         Behavior: Behavior is cooperative.            Assessment:      1. Acute bacterial sinusitis    2. Cough           Plan:      1. Acute bacterial sinusitis  - azelastine (ASTELIN) 137 mcg (0.1 %) nasal spray; 1 spray (137 mcg total) by Nasal route 2 (two) times daily.  Dispense: 30 mL; Refill: 1  - doxycycline (VIBRAMYCIN) 100 MG Cap; Take 1 capsule (100 mg total) by mouth every 12 (twelve) hours. for 5 days  Dispense: 10 capsule; Refill: 0  - saline nasal spray as needed to prevent dry nasal passages and bleeding    2. Cough  - benzonatate (TESSALON) 200 MG capsule; Take 1 capsule (200 mg total) by mouth 3 (three) times daily as needed.  Dispense: 30 capsule; Refill: 0  - X-Ray Chest PA And Lateral; Future  - may use Mucinex as needed    Call if there is no improvement in symptoms    __________________________    Vidya Ray MD, PharmD  [facility]- Internal Medicine  American Board of Obesity Medicine diplomate  Office [phone]

## 2022-08-24 ENCOUNTER — TELEPHONE (OUTPATIENT)
Dept: INTERNAL MEDICINE | Facility: CLINIC | Age: 87
End: 2022-08-24

## 2022-08-24 NOTE — TELEPHONE ENCOUNTER
Pt notified and verbalized understanding.    Her prescription is being delivered today and she'll start taking today.    Wants you to know she says you're a great doctor!!

## 2022-08-24 NOTE — TELEPHONE ENCOUNTER
----- Message from Vidya Ray MD sent at 8/23/2022  1:27 PM CDT -----  The x-ray shows no signs of pneumonia. Please continue treatment as we discussed.    Call us if there is no improvement in the symptoms.

## 2022-09-13 ENCOUNTER — PATIENT MESSAGE (OUTPATIENT)
Dept: INTERNAL MEDICINE | Facility: CLINIC | Age: 87
End: 2022-09-13
Payer: MEDICARE

## 2022-09-20 ENCOUNTER — HOSPITAL ENCOUNTER (OUTPATIENT)
Dept: RADIOLOGY | Facility: HOSPITAL | Age: 87
Discharge: HOME | End: 2022-09-20
Attending: INTERNAL MEDICINE
Payer: MEDICARE

## 2022-09-20 ENCOUNTER — OFFICE VISIT (OUTPATIENT)
Dept: INTERNAL MEDICINE | Facility: CLINIC | Age: 87
End: 2022-09-20
Payer: MEDICARE

## 2022-09-20 VITALS
TEMPERATURE: 98 F | DIASTOLIC BLOOD PRESSURE: 72 MMHG | RESPIRATION RATE: 16 BRPM | HEART RATE: 72 BPM | WEIGHT: 168.63 LBS | HEIGHT: 66 IN | BODY MASS INDEX: 27.1 KG/M2 | SYSTOLIC BLOOD PRESSURE: 114 MMHG | OXYGEN SATURATION: 98 %

## 2022-09-20 DIAGNOSIS — R25.2 BILATERAL LEG CRAMPS: ICD-10-CM

## 2022-09-20 DIAGNOSIS — L03.115 CELLULITIS AND ABSCESS OF RIGHT LOWER EXTREMITY: Primary | ICD-10-CM

## 2022-09-20 DIAGNOSIS — Z12.31 VISIT FOR SCREENING MAMMOGRAM: ICD-10-CM

## 2022-09-20 DIAGNOSIS — L02.415 CELLULITIS AND ABSCESS OF RIGHT LOWER EXTREMITY: Primary | ICD-10-CM

## 2022-09-20 DIAGNOSIS — I87.2 VENOUS STASIS DERMATITIS OF RIGHT LOWER EXTREMITY: ICD-10-CM

## 2022-09-20 DIAGNOSIS — R60.0 LEG EDEMA, RIGHT: ICD-10-CM

## 2022-09-20 DIAGNOSIS — I87.2 VENOUS INSUFFICIENCY OF BOTH LOWER EXTREMITIES: ICD-10-CM

## 2022-09-20 DIAGNOSIS — I50.32 CHRONIC DIASTOLIC HEART FAILURE: ICD-10-CM

## 2022-09-20 DIAGNOSIS — I48.20 CHRONIC ATRIAL FIBRILLATION: ICD-10-CM

## 2022-09-20 PROCEDURE — 1159F MED LIST DOCD IN RCRD: CPT | Mod: CPTII,S$GLB,, | Performed by: INTERNAL MEDICINE

## 2022-09-20 PROCEDURE — 1126F AMNT PAIN NOTED NONE PRSNT: CPT | Mod: CPTII,S$GLB,, | Performed by: INTERNAL MEDICINE

## 2022-09-20 PROCEDURE — 99999 PR PBB SHADOW E&M-EST. PATIENT-LVL III: CPT | Mod: PBBFAC,,, | Performed by: INTERNAL MEDICINE

## 2022-09-20 PROCEDURE — 99999 PR PBB SHADOW E&M-EST. PATIENT-LVL III: ICD-10-PCS | Mod: PBBFAC,,, | Performed by: INTERNAL MEDICINE

## 2022-09-20 PROCEDURE — 3288F PR FALLS RISK ASSESSMENT DOCUMENTED: ICD-10-PCS | Mod: CPTII,S$GLB,, | Performed by: INTERNAL MEDICINE

## 2022-09-20 PROCEDURE — 99214 PR OFFICE/OUTPT VISIT, EST, LEVL IV, 30-39 MIN: ICD-10-PCS | Mod: S$GLB,,, | Performed by: INTERNAL MEDICINE

## 2022-09-20 PROCEDURE — 1126F PR PAIN SEVERITY QUANTIFIED, NO PAIN PRESENT: ICD-10-PCS | Mod: CPTII,S$GLB,, | Performed by: INTERNAL MEDICINE

## 2022-09-20 PROCEDURE — 1101F PR PT FALLS ASSESS DOC 0-1 FALLS W/OUT INJ PAST YR: ICD-10-PCS | Mod: CPTII,S$GLB,, | Performed by: INTERNAL MEDICINE

## 2022-09-20 PROCEDURE — 77063 MAMMO DIGITAL SCREENING BILAT WITH TOMO: ICD-10-PCS | Mod: 26,,, | Performed by: RADIOLOGY

## 2022-09-20 PROCEDURE — 3288F FALL RISK ASSESSMENT DOCD: CPT | Mod: CPTII,S$GLB,, | Performed by: INTERNAL MEDICINE

## 2022-09-20 PROCEDURE — 1159F PR MEDICATION LIST DOCUMENTED IN MEDICAL RECORD: ICD-10-PCS | Mod: CPTII,S$GLB,, | Performed by: INTERNAL MEDICINE

## 2022-09-20 PROCEDURE — 77063 BREAST TOMOSYNTHESIS BI: CPT | Mod: TC,PO

## 2022-09-20 PROCEDURE — 99214 OFFICE O/P EST MOD 30 MIN: CPT | Mod: S$GLB,,, | Performed by: INTERNAL MEDICINE

## 2022-09-20 PROCEDURE — 77063 BREAST TOMOSYNTHESIS BI: CPT | Mod: 26,,, | Performed by: RADIOLOGY

## 2022-09-20 PROCEDURE — 77067 SCR MAMMO BI INCL CAD: CPT | Mod: 26,,, | Performed by: RADIOLOGY

## 2022-09-20 PROCEDURE — 1101F PT FALLS ASSESS-DOCD LE1/YR: CPT | Mod: CPTII,S$GLB,, | Performed by: INTERNAL MEDICINE

## 2022-09-20 PROCEDURE — 77067 MAMMO DIGITAL SCREENING BILAT WITH TOMO: ICD-10-PCS | Mod: 26,,, | Performed by: RADIOLOGY

## 2022-09-20 RX ORDER — AMOXICILLIN AND CLAVULANATE POTASSIUM 875; 125 MG/1; MG/1
1 TABLET, FILM COATED ORAL 2 TIMES DAILY
Qty: 20 TABLET | Refills: 0 | Status: SHIPPED | OUTPATIENT
Start: 2022-09-20 | End: 2022-09-25

## 2022-09-20 RX ORDER — MUPIROCIN 20 MG/G
OINTMENT TOPICAL 3 TIMES DAILY
Qty: 22 G | Refills: 0 | Status: ON HOLD | OUTPATIENT
Start: 2022-09-20 | End: 2022-10-10 | Stop reason: HOSPADM

## 2022-09-20 NOTE — PROGRESS NOTES
CC: leg wound-cellulitis/abcess    89 y.o. w/ HTN, Afib, CDHF, CKD 3b, pre-DM,  and venous insufficiency LE  presents today for cellulitis  Started-one month  Location-RLE  Trauma-hit bed frame?  TDAP: 2018  Tx-Podiatrist escripted Levaquin and she didn't want to take 2/2 to warning about mm problems of the shoulder w/ her present shoulder ds  Denied fever, chills, or night sweats    MEDCARD: Reviewed  ROS:  No fatigue or lethargy  No fever, chills, or night sweats  No chest pain or palpitations  No PND or orthopnea   No abdominal pain or GERD  B/L leg cramps  Remainder or review negative except as previously noted    PMHX: Reviewed  SHX: Reviewed  FHX: Reviewed    PE:  VSS:  GEN: WDWN, A&O, NAD, conversant and co-operative  EYES: Conj/lids unremarkable, sclera anicteric  ENT: O/P mucus membranes moist w/o lesions or stridor  NECK: Supple w/o lymphadenopathy or thyromegaly  RESP: Efforts unlabored, lungs CTA  CV: Heart RRR,  diminished pedal pulses,   1+ pitting right sided LE edema  MSK: Gait impaired, + walker. No CCE  NEURO: PIKE. No tremor noted  SKIN: cellulitis anterior RLE w/ warmth, erythema and  Serous d/c and denuding of several areas of the dermis. + tender to palpation     IMPRESSION:  Cellulitis, RLE  LE edema, right  Venous stasis dermatitis, RLE , 2+ pitting today  Venous insufficiency, noted on CV LE studies - test reviewed  Night cramps, intermittent  Afib, stable today  CDHF, on furosemide- noted 2-3# weight gain w/ RLE infection    PLAN:   Rx augmentin 875 BID x 10 days  Rx Bactroban  Hydration  ELevation  Furosemide 20 mg - take 2 each morning x 3 days  Increase potassium diet  Add mg to lab today

## 2022-09-22 ENCOUNTER — PATIENT MESSAGE (OUTPATIENT)
Dept: OPHTHALMOLOGY | Facility: CLINIC | Age: 87
End: 2022-09-22
Payer: MEDICARE

## 2022-09-25 ENCOUNTER — HOSPITAL ENCOUNTER (EMERGENCY)
Facility: HOSPITAL | Age: 87
Discharge: HOME | End: 2022-09-26
Attending: EMERGENCY MEDICINE
Payer: MEDICARE

## 2022-09-25 DIAGNOSIS — I50.32 CHRONIC DIASTOLIC HEART FAILURE: ICD-10-CM

## 2022-09-25 DIAGNOSIS — M79.89 RIGHT LEG SWELLING: ICD-10-CM

## 2022-09-25 DIAGNOSIS — M79.89 LEG SWELLING: ICD-10-CM

## 2022-09-25 DIAGNOSIS — L03.115 CELLULITIS OF RIGHT LOWER EXTREMITY: Primary | ICD-10-CM

## 2022-09-25 LAB
ALBUMIN SERPL BCP-MCNC: 4 G/DL (ref 3.5–5.2)
ALP SERPL-CCNC: 122 U/L (ref 55–135)
ALT SERPL W/O P-5'-P-CCNC: 12 U/L (ref 10–44)
ANION GAP SERPL CALC-SCNC: 11 MMOL/L (ref 8–16)
AST SERPL-CCNC: 17 U/L (ref 10–40)
BASOPHILS # BLD AUTO: 0.03 K/UL (ref 0–0.2)
BASOPHILS NFR BLD: 0.7 % (ref 0–1.9)
BILIRUB SERPL-MCNC: 0.6 MG/DL (ref 0.1–1)
BNP SERPL-MCNC: 153 PG/ML (ref 0–99)
BUN SERPL-MCNC: 34 MG/DL (ref 8–23)
CALCIUM SERPL-MCNC: 9.5 MG/DL (ref 8.7–10.5)
CHLORIDE SERPL-SCNC: 105 MMOL/L (ref 95–110)
CO2 SERPL-SCNC: 22 MMOL/L (ref 23–29)
CREAT SERPL-MCNC: 1.2 MG/DL (ref 0.5–1.4)
CRP SERPL-MCNC: 2.1 MG/L (ref 0–8.2)
DIFFERENTIAL METHOD: ABNORMAL
EOSINOPHIL # BLD AUTO: 0.1 K/UL (ref 0–0.5)
EOSINOPHIL NFR BLD: 2.5 % (ref 0–8)
ERYTHROCYTE [DISTWIDTH] IN BLOOD BY AUTOMATED COUNT: 15.4 % (ref 11.5–14.5)
ERYTHROCYTE [SEDIMENTATION RATE] IN BLOOD BY PHOTOMETRIC METHOD: 29 MM/HR (ref 0–36)
EST. GFR  (NO RACE VARIABLE): 43 ML/MIN/1.73 M^2
GLUCOSE SERPL-MCNC: 103 MG/DL (ref 70–110)
HCT VFR BLD AUTO: 33.3 % (ref 37–48.5)
HGB BLD-MCNC: 11 G/DL (ref 12–16)
IMM GRANULOCYTES # BLD AUTO: 0.03 K/UL (ref 0–0.04)
IMM GRANULOCYTES NFR BLD AUTO: 0.7 % (ref 0–0.5)
LYMPHOCYTES # BLD AUTO: 0.8 K/UL (ref 1–4.8)
LYMPHOCYTES NFR BLD: 18.7 % (ref 18–48)
MCH RBC QN AUTO: 29.3 PG (ref 27–31)
MCHC RBC AUTO-ENTMCNC: 33 G/DL (ref 32–36)
MCV RBC AUTO: 89 FL (ref 82–98)
MONOCYTES # BLD AUTO: 0.5 K/UL (ref 0.3–1)
MONOCYTES NFR BLD: 11.5 % (ref 4–15)
NEUTROPHILS # BLD AUTO: 2.9 K/UL (ref 1.8–7.7)
NEUTROPHILS NFR BLD: 65.9 % (ref 38–73)
NRBC BLD-RTO: 0 /100 WBC
PLATELET # BLD AUTO: 182 K/UL (ref 150–450)
PMV BLD AUTO: 10.3 FL (ref 9.2–12.9)
POTASSIUM SERPL-SCNC: 4.5 MMOL/L (ref 3.5–5.1)
PROT SERPL-MCNC: 7 G/DL (ref 6–8.4)
RBC # BLD AUTO: 3.75 M/UL (ref 4–5.4)
SODIUM SERPL-SCNC: 138 MMOL/L (ref 136–145)
TROPONIN I SERPL DL<=0.01 NG/ML-MCNC: <0.006 NG/ML (ref 0–0.03)
WBC # BLD AUTO: 4.34 K/UL (ref 3.9–12.7)

## 2022-09-25 PROCEDURE — 84484 ASSAY OF TROPONIN QUANT: CPT | Performed by: PHYSICIAN ASSISTANT

## 2022-09-25 PROCEDURE — 99285 EMERGENCY DEPT VISIT HI MDM: CPT | Mod: ,,, | Performed by: PHYSICIAN ASSISTANT

## 2022-09-25 PROCEDURE — 93005 ELECTROCARDIOGRAM TRACING: CPT

## 2022-09-25 PROCEDURE — 86803 HEPATITIS C AB TEST: CPT | Performed by: EMERGENCY MEDICINE

## 2022-09-25 PROCEDURE — 99285 EMERGENCY DEPT VISIT HI MDM: CPT | Mod: 25

## 2022-09-25 PROCEDURE — 25000003 PHARM REV CODE 250: Performed by: PHYSICIAN ASSISTANT

## 2022-09-25 PROCEDURE — 85652 RBC SED RATE AUTOMATED: CPT | Performed by: PHYSICIAN ASSISTANT

## 2022-09-25 PROCEDURE — 99285 PR EMERGENCY DEPT VISIT,LEVEL V: ICD-10-PCS | Mod: ,,, | Performed by: PHYSICIAN ASSISTANT

## 2022-09-25 PROCEDURE — 36000 PLACE NEEDLE IN VEIN: CPT

## 2022-09-25 PROCEDURE — 93010 ELECTROCARDIOGRAM REPORT: CPT | Mod: ,,, | Performed by: INTERNAL MEDICINE

## 2022-09-25 PROCEDURE — 80053 COMPREHEN METABOLIC PANEL: CPT | Performed by: PHYSICIAN ASSISTANT

## 2022-09-25 PROCEDURE — 86140 C-REACTIVE PROTEIN: CPT | Performed by: PHYSICIAN ASSISTANT

## 2022-09-25 PROCEDURE — 87389 HIV-1 AG W/HIV-1&-2 AB AG IA: CPT | Performed by: EMERGENCY MEDICINE

## 2022-09-25 PROCEDURE — 83880 ASSAY OF NATRIURETIC PEPTIDE: CPT | Performed by: PHYSICIAN ASSISTANT

## 2022-09-25 PROCEDURE — 93010 EKG 12-LEAD: ICD-10-PCS | Mod: ,,, | Performed by: INTERNAL MEDICINE

## 2022-09-25 PROCEDURE — 85025 COMPLETE CBC W/AUTO DIFF WBC: CPT | Performed by: PHYSICIAN ASSISTANT

## 2022-09-25 RX ORDER — CEPHALEXIN 500 MG/1
500 CAPSULE ORAL
Status: COMPLETED | OUTPATIENT
Start: 2022-09-25 | End: 2022-09-25

## 2022-09-25 RX ORDER — DOXYCYCLINE HYCLATE 100 MG
100 TABLET ORAL
Status: COMPLETED | OUTPATIENT
Start: 2022-09-25 | End: 2022-09-25

## 2022-09-25 RX ORDER — DOXYCYCLINE 100 MG/1
100 CAPSULE ORAL 2 TIMES DAILY
Qty: 13 CAPSULE | Refills: 0 | Status: SHIPPED | OUTPATIENT
Start: 2022-09-25 | End: 2022-10-02

## 2022-09-25 RX ORDER — CEPHALEXIN 500 MG/1
500 CAPSULE ORAL 4 TIMES DAILY
Qty: 19 CAPSULE | Refills: 0 | Status: SHIPPED | OUTPATIENT
Start: 2022-09-25 | End: 2022-09-30

## 2022-09-25 RX ADMIN — CEPHALEXIN 500 MG: 500 CAPSULE ORAL at 11:09

## 2022-09-25 RX ADMIN — DOXYCYCLINE HYCLATE 100 MG: 100 TABLET, COATED ORAL at 11:09

## 2022-09-26 VITALS
BODY MASS INDEX: 27.99 KG/M2 | TEMPERATURE: 98 F | RESPIRATION RATE: 20 BRPM | HEIGHT: 65 IN | HEART RATE: 73 BPM | WEIGHT: 168 LBS | DIASTOLIC BLOOD PRESSURE: 71 MMHG | SYSTOLIC BLOOD PRESSURE: 161 MMHG | OXYGEN SATURATION: 99 %

## 2022-09-26 LAB
HCV AB SERPL QL IA: NORMAL
HIV 1+2 AB+HIV1 P24 AG SERPL QL IA: NORMAL

## 2022-09-26 NOTE — ED PROVIDER NOTES
"Encounter Date: 9/25/2022       History     Chief Complaint   Patient presents with    Leg Swelling     Redness and swelling to R lower leg, x 1 month. Pt ambulatory in triage. Denies fever/chills     The history is provided by the patient and medical records. No  was used.     Jenniffer Jimenez is a 89 y.o. female with medical history of HTN, HLD, AFib  s/p Watchman, Diastolic HF presenting to the ED with the chief complaint of right leg swelling.     Reports worsening R lower leg pain and swelling that began about 1 month ago. Believes she may have scrapped her leg against her bed frame, but unsure. No falls or trauma. Saw PCP 5 days ago who gave her Augmentin RX that has not provided any improvement. Reports feeling more SOB than normal and has been sitting up more in bed. Reports having a non-productive cough. She is compliant with her Lasix. She is on daily ASA 81mg. No fever, chest pain, abdominal pain, vomiting, urinary or bowel movement changes. Lives alone with her cat. Ambulates with a walker.     Review of patient's allergies indicates:   Allergen Reactions    Opioids - morphine analogues Other (See Comments)     Bowel issues; bowel obstruction    Tizanidine Other (See Comments)     "Lips were numb,  Almost passed out."    Tramadol Hallucinations    Beta-blockers (beta-adrenergic blocking agts) Other (See Comments)     Can not go on beta blockers for long period of time - due to taking allergy injections    Morphine     Opioids-meperidine and related      Past Medical History:   Diagnosis Date    Amblyopia of left eye 4/10/2013    Anxiety     Arthritis     Facet arthropathy, Lumbosacral    Cataract     Central retinal vein occlusion of left eye     Depression     Diabetic polyneuropathy 1/6/2022    Exotropia of both eyes 2/6/2013    recession RSR 5.0mm w/ adj; recession LR os 5.0 w/ adj; resect MR os  4.0mm    Hearing loss     History of resection of small bowel     Hypertensive " retinopathy of both eyes     Hypoglycemia     Macular degeneration     OA (osteoarthritis) of shoulder     Right    Osteoporosis     Posterior vitreous detachment of both eyes     Rhinitis     TIA (transient ischemic attack)      Past Surgical History:   Procedure Laterality Date    APPENDECTOMY      CARDIAC CATHETERIZATION      CATARACT EXTRACTION W/  INTRAOCULAR LENS IMPLANT Bilateral      SECTION, CLASSIC      CLOSURE OF LEFT ATRIAL APPENDAGE USING DEVICE N/A 2020    Procedure: Left atrial appendage closure device;  Surgeon: Abundio Curtis MD;  Location: [facility] CATH LAB;  Service: Cardiology;  Laterality: N/A;    HYSTERECTOMY      INNER EAR SURGERY      JOINT REPLACEMENT      LEFT KNEE REPLACEMENT IN  -    OOPHORECTOMY      SINUS SURGERY      STRABISMUS SURGERY  13    RSR recession 5 mm, LLR recession 5 mm and LMR resection 4mm    STRABISMUS SURGERY  2014    recess LR OD 6mm    TONSILLECTOMY      watchman surgery N/A 2020     Family History   Problem Relation Age of Onset    Hypertension Mother     Hypertension Father     Liver disease Sister     Diabetes Sister     Heart disease Sister     Diabetes Brother     Breast cancer Maternal Aunt     No Known Problems Maternal Uncle     No Known Problems Paternal Aunt     No Known Problems Paternal Uncle     No Known Problems Maternal Grandmother     No Known Problems Maternal Grandfather     No Known Problems Paternal Grandmother     No Known Problems Paternal Grandfather     No Known Problems Daughter     No Known Problems Son     Heart disease Sister     No Known Problems Son     No Known Problems Son     Cancer Neg Hx         in first degree relatives    Melanoma Neg Hx     Psoriasis Neg Hx     Lupus Neg Hx     Amblyopia Neg Hx     Blindness Neg Hx     Cataracts Neg Hx     Glaucoma Neg Hx     Macular degeneration Neg Hx     Retinal detachment Neg Hx     Strabismus Neg Hx     Stroke Neg Hx     Thyroid disease Neg Hx      Social History      Tobacco Use    Smoking status: Former     Types: Cigarettes     Quit date: 10/29/1982     Years since quittin.9    Smokeless tobacco: Never   Substance Use Topics    Alcohol use: Yes     Alcohol/week: 2.0 standard drinks     Types: 2 Shots of liquor per week     Comment: rare    Drug use: No     Review of Systems   Constitutional:  Negative for fever.   HENT:  Negative for sore throat.    Eyes:  Negative for pain.   Respiratory:  Positive for cough and shortness of breath.    Cardiovascular:  Positive for leg swelling. Negative for chest pain.   Gastrointestinal:  Negative for nausea.   Genitourinary:  Negative for dysuria.   Musculoskeletal:  Negative for back pain.   Skin:  Positive for wound. Negative for rash.   Neurological:  Negative for weakness.   Hematological:  Does not bruise/bleed easily.     Physical Exam     Initial Vitals [22]   BP Pulse Resp Temp SpO2   137/63 88 20 98.6 °F (37 °C) 99 %      MAP       --         Physical Exam    Constitutional: She appears well-developed and well-nourished. She is not diaphoretic. No distress.   Hard of hearing   HENT:   Head: Normocephalic and atraumatic.   Mouth/Throat: Oropharynx is clear and moist. No oropharyngeal exudate.   Eyes: Conjunctivae and EOM are normal. Pupils are equal, round, and reactive to light. No scleral icterus.   Neck: Neck supple.   Normal range of motion.  Cardiovascular:  Normal rate and regular rhythm.           +2 DP pulses BLE   Pulmonary/Chest: Breath sounds normal. No respiratory distress. She has no wheezes.   Abdominal: Abdomen is soft. She exhibits no distension. There is no abdominal tenderness. There is no rebound.   Musculoskeletal:         General: No tenderness or edema. Normal range of motion.      Cervical back: Normal range of motion and neck supple.      Comments: +2 pitting edema to RLE up to knee. Macular erythematous rash to lateral aspect of tib-fib with flaking skin. Yellow serous expressible fluid.  TTP and warm to touch.   Full A/P ROM of BLE     Neurological: She is alert and oriented to person, place, and time. She has normal strength. No sensory deficit.   Skin: Skin is warm and dry. No rash noted. No erythema.   Psychiatric: She has a normal mood and affect.       ED Course   Procedures  Labs Reviewed   CBC W/ AUTO DIFFERENTIAL - Abnormal; Notable for the following components:       Result Value    RBC 3.75 (*)     Hemoglobin 11.0 (*)     Hematocrit 33.3 (*)     RDW 15.4 (*)     Immature Granulocytes 0.7 (*)     Lymph # 0.8 (*)     All other components within normal limits    Narrative:     Release to patient->Immediate   COMPREHENSIVE METABOLIC PANEL - Abnormal; Notable for the following components:    CO2 22 (*)     BUN 34 (*)     eGFR 43.0 (*)     All other components within normal limits    Narrative:     Release to patient->Immediate   B-TYPE NATRIURETIC PEPTIDE - Abnormal; Notable for the following components:     (*)     All other components within normal limits    Narrative:     Release to patient->Immediate   TROPONIN I    Narrative:     Release to patient->Immediate   SEDIMENTATION RATE    Narrative:     Release to patient->Immediate   C-REACTIVE PROTEIN    Narrative:     Release to patient->Immediate   HIV 1 / 2 ANTIBODY   HEPATITIS C ANTIBODY          Imaging Results              US Lower Extremity Veins Right (Final result)  Result time 09/25/22 23:21:29      Final result by Kyle Bennett MD (09/25/22 23:21:29)                   Impression:      No evidence of deep venous thrombosis in the right lower extremity.    Soft tissue edema in the right popliteal fossa and medial right calf.      Electronically signed by: Kyle Bennett MD  Date:    09/25/2022  Time:    23:21               Narrative:    EXAMINATION:  US LOWER EXTREMITY VEINS RIGHT    CLINICAL HISTORY:  Other specified soft tissue disorders    TECHNIQUE:  Duplex and color flow Doppler evaluation and graded compression of  the right lower extremity veins was performed.    COMPARISON:  01/01/2020.    FINDINGS:  Right thigh veins: The common femoral, femoral, popliteal, upper greater saphenous, and deep femoral veins are patent and free of thrombus. The veins are normally compressible and have normal phasic flow and augmentation response.    Right calf veins: The visualized calf veins are patent.    Contralateral CFV: The contralateral (left) common femoral vein is patent and free of thrombus.    Miscellaneous: Soft tissue edema in the right popliteal fossa and medial calf.                                       X-Ray Chest AP Portable (Final result)  Result time 09/25/22 21:36:18      Final result by Celestino Breen MD (09/25/22 21:36:18)                   Impression:      Acute on chronic change noted with interstitial and alveolar infiltrates superimposed on chronic change, as discussed above.      Electronically signed by: Celestino Breen  Date:    09/25/2022  Time:    21:36               Narrative:    EXAMINATION:  XR CHEST AP PORTABLE    CLINICAL HISTORY:  Other specified soft tissue disorders    TECHNIQUE:  Single frontal view of the chest was performed.    COMPARISON:  Chest radiograph August 23, 2022    FINDINGS:  Single portable chest view is submitted.  When accounting for difference in position, technique and depth of inspiration, the appearance of the cardiomediastinal silhouette is stable.  Aortic atherosclerotic change noted.    Accentuation of pulmonary interstitial and bronchovascular markings noted, on comparison to prior studies there is an underlying chronic appearance.  However there is superimposed pattern of acute infiltrate.  This is more prominent on the right.  There is perihilar, central and basilar infiltrate including interstitial and patchy and mildly confluent alveolar infiltrate particularly at the right base.  There is mild left perihilar and basilar interstitial infiltrate and minimal patchy alveolar  infiltrates suggested.  These findings are seen as an interval change.    There is no evidence for pleural effusion and there is no evidence for pneumothorax.  The osseous structures demonstrate chronic change.  There is diminished mineralization and there is degenerative change, postoperative change of the left humerus with associated intramedullary krysten noted.                                       Medications   cephALEXin capsule 500 mg (has no administration in time range)   doxycycline tablet 100 mg (has no administration in time range)     Medical Decision Making:   History:   Old Medical Records: I decided to obtain old medical records.  Old Records Summarized: records from clinic visits and records from previous admission(s).  Independently Interpreted Test(s):   I have ordered and independently interpreted EKG Reading(s) - see summary below       <> Summary of EKG Reading(s): AFib 76 bpm. No STEMI  Clinical Tests:   Lab Tests: Ordered and Reviewed  Radiological Study: Ordered and Reviewed  Medical Tests: Ordered and Reviewed     APC / Resident Notes:   89 y.o. female with medical history of HTN, HLD, AFib  s/p Watchman, Diastolic HF presenting to the ED c/o worsening RLE swelling and redness despite compliance with Augmentin therapy. DDx includes but not limited to venous dermatitis, cellulitis, CHF exacerbation, DVT, dependent edema.       ED Course as of 09/25/22 2359   Sun Sep 25, 2022   2330 CRP: 2.1 [BA]   2330 Sed Rate: 29 [BA]   2330 WBC: 4.34 [BA]   2330 Creatinine: 1.2 [BA]   2330 Troponin I: <0.006 [BA]   2330 BNP(!): 153  Appears to be at her baseline [BA]   2330 CXR with interstitial and alveolar infiltrates that appear acute on chronic. No large consolidations or pulmonary edema [BA]   2331 Venous US negative for DVT [BA]   2331 No significant findings on laboratory work. Afebrile and non-toxic appearing. Outpatient management appropriate at this time with PCP follow-up within the next week. Will  switch patient from Augmentin to Doxy + Keflex for further management of her RLE cellulitis. Encouraged her to keep her leg elevated while at rest and sleeping. Patient expresses understanding and agreeable to the plan. Return to ED precautions given for new, worsening, or concerning symptoms. I have discussed the care of this patient with my supervising physician.  [BA]      ED Course User Index  [BA] Himanshu Rivas PA-C                 Clinical Impression:   Final diagnoses:  [M79.89] Leg swelling  [M79.89] Right leg swelling  [L03.115] Cellulitis of right lower extremity (Primary)  [I50.32] Chronic diastolic heart failure      ED Disposition Condition    Discharge Stable          ED Prescriptions       Medication Sig Dispense Start Date End Date Auth. Provider    cephALEXin (KEFLEX) 500 MG capsule Take 1 capsule (500 mg total) by mouth 4 (four) times daily. for 5 days 19 capsule 9/25/2022 9/30/2022 Himanshu Rivas PA-C    doxycycline (VIBRAMYCIN) 100 MG Cap Take 1 capsule (100 mg total) by mouth 2 (two) times daily. for 7 days 13 capsule 9/25/2022 10/2/2022 Himanshu Rivas PA-C          Follow-up Information       Follow up With Specialties Details Why Contact Info    Rubi Young,  Internal Medicine   [address] [place] [address]  [phone]               Himanshu Rivas PA-C  09/25/22 5035

## 2022-09-26 NOTE — DISCHARGE INSTRUCTIONS
Stop taking the previously prescribed Augmentin  Begin taking the prescribed Keflex and Doxycycline for further management of your leg cellulitis.   Keep your leg elevated while at rest  Follow-up with your primary care provider for further management.     Return to the emergency room for new, worsening, or concerning symptoms.     Future Appointments   Date Time Provider Department Center   9/26/2022  3:00 PM Camryn Merino NP [facility]   9/29/2022  9:40 AM Himanshu Queen DO [facility] [place]   10/5/2022  8:15 AM [place], ALLERGY INJECTION [facility] [place]   10/17/2022  8:30 AM Luis Scott MD [facility] [place]

## 2022-09-26 NOTE — ED NOTES
"Jenniffer Jimenez, an 89 y.o. female presents to the ED complaining of leg swelling of the right lower leg that has been going on for 1 month but worse today. States she may have hit her leg on the bed at some point. There are a few areas of skin tears and erythema. Also endorses cramps that radiate from the lower extremity to the groin. Denies HA, CP, abd pain, NVD, dysuria.       LOC: The patient is awake, alert and aware of environment with an appropriate affect, the patient is oriented x 3 and speaking appropriately.   APPEARANCE: Patient appears comfortable and in no acute distress, patient is clean and well groomed.  SKIN: The skin is warm and dry, color consistent with ethnicity.   MUSCULOSKELETAL: Patient moving all extremities spontaneously. + swelling and erythema noted to the right lower extremity  RESPIRATORY: Airway is open and patent, respirations are spontaneous, patient has a normal effort and rate, no accessory muscle use noted.  CARDIAC: Patient has a normal rate and regular rhythm, no edema noted, capillary refill < 3 seconds.   GASTRO: Soft and non tender to palpation, no distention noted.   : Pt denies any pain or frequency with urination.  NEURO: Pt opens eyes spontaneously, behavior appropriate to situation, follows commands.        Chief Complaint   Patient presents with    Leg Swelling     Redness and swelling to R lower leg, x 1 month. Pt ambulatory in triage. Denies fever/chills     Review of patient's allergies indicates:   Allergen Reactions    Opioids - morphine analogues Other (See Comments)     Bowel issues; bowel obstruction    Tizanidine Other (See Comments)     "Lips were numb,  Almost passed out."    Tramadol Hallucinations    Beta-blockers (beta-adrenergic blocking agts) Other (See Comments)     Can not go on beta blockers for long period of time - due to taking allergy injections    Morphine     Opioids-meperidine and related      Past Medical History:   Diagnosis Date    " Amblyopia of left eye 4/10/2013    Anxiety     Arthritis     Facet arthropathy, Lumbosacral    Cataract     Central retinal vein occlusion of left eye     Depression     Diabetic polyneuropathy 1/6/2022    Exotropia of both eyes 2/6/2013    recession RSR 5.0mm w/ adj; recession LR os 5.0 w/ adj; resect MR os  4.0mm    Hearing loss     History of resection of small bowel     Hypertensive retinopathy of both eyes     Hypoglycemia     Macular degeneration     OA (osteoarthritis) of shoulder     Right    Osteoporosis     Posterior vitreous detachment of both eyes     Rhinitis     TIA (transient ischemic attack)

## 2022-09-29 ENCOUNTER — OFFICE VISIT (OUTPATIENT)
Dept: INTERNAL MEDICINE | Facility: CLINIC | Age: 87
End: 2022-09-29
Payer: MEDICARE

## 2022-09-29 VITALS
HEART RATE: 81 BPM | BODY MASS INDEX: 27.63 KG/M2 | OXYGEN SATURATION: 100 % | SYSTOLIC BLOOD PRESSURE: 128 MMHG | RESPIRATION RATE: 18 BRPM | DIASTOLIC BLOOD PRESSURE: 60 MMHG | HEIGHT: 65 IN | WEIGHT: 165.81 LBS | TEMPERATURE: 98 F

## 2022-09-29 DIAGNOSIS — N18.32 STAGE 3B CHRONIC KIDNEY DISEASE: ICD-10-CM

## 2022-09-29 DIAGNOSIS — R73.03 PREDIABETES: ICD-10-CM

## 2022-09-29 DIAGNOSIS — G89.29 CHRONIC LEFT SHOULDER PAIN: Primary | ICD-10-CM

## 2022-09-29 DIAGNOSIS — D64.9 NORMOCYTIC NORMOCHROMIC ANEMIA: ICD-10-CM

## 2022-09-29 DIAGNOSIS — Z86.73 HISTORY OF TIA (TRANSIENT ISCHEMIC ATTACK): ICD-10-CM

## 2022-09-29 DIAGNOSIS — M81.6 LOCALIZED OSTEOPOROSIS, UNSPECIFIED PATHOLOGICAL FRACTURE PRESENCE: ICD-10-CM

## 2022-09-29 DIAGNOSIS — I48.20 CHRONIC ATRIAL FIBRILLATION: Chronic | ICD-10-CM

## 2022-09-29 DIAGNOSIS — M19.90 ARTHRITIS: ICD-10-CM

## 2022-09-29 DIAGNOSIS — G89.29 CHRONIC PAIN OF BOTH HIPS: ICD-10-CM

## 2022-09-29 DIAGNOSIS — D69.2 SENILE PURPURA: ICD-10-CM

## 2022-09-29 DIAGNOSIS — M25.512 CHRONIC LEFT SHOULDER PAIN: Primary | ICD-10-CM

## 2022-09-29 DIAGNOSIS — Z00.00 ANNUAL PHYSICAL EXAM: ICD-10-CM

## 2022-09-29 DIAGNOSIS — M25.552 CHRONIC PAIN OF BOTH HIPS: ICD-10-CM

## 2022-09-29 DIAGNOSIS — M25.551 CHRONIC PAIN OF BOTH HIPS: ICD-10-CM

## 2022-09-29 DIAGNOSIS — I50.32 CHRONIC DIASTOLIC HEART FAILURE: Chronic | ICD-10-CM

## 2022-09-29 DIAGNOSIS — E78.00 PURE HYPERCHOLESTEROLEMIA: Chronic | ICD-10-CM

## 2022-09-29 DIAGNOSIS — Z95.818 PRESENCE OF WATCHMAN LEFT ATRIAL APPENDAGE CLOSURE DEVICE: Chronic | ICD-10-CM

## 2022-09-29 DIAGNOSIS — I70.0 ATHEROSCLEROSIS OF AORTA: Chronic | ICD-10-CM

## 2022-09-29 DIAGNOSIS — R26.81 GAIT INSTABILITY: ICD-10-CM

## 2022-09-29 DIAGNOSIS — I10 PRIMARY HYPERTENSION: Chronic | ICD-10-CM

## 2022-09-29 PROCEDURE — G0008 ADMIN INFLUENZA VIRUS VAC: HCPCS | Mod: S$GLB,,, | Performed by: INTERNAL MEDICINE

## 2022-09-29 PROCEDURE — 90694 FLU VACCINE - QUADRIVALENT - ADJUVANTED: ICD-10-PCS | Mod: S$GLB,,, | Performed by: INTERNAL MEDICINE

## 2022-09-29 PROCEDURE — 3288F PR FALLS RISK ASSESSMENT DOCUMENTED: ICD-10-PCS | Mod: CPTII,S$GLB,, | Performed by: INTERNAL MEDICINE

## 2022-09-29 PROCEDURE — 1125F PR PAIN SEVERITY QUANTIFIED, PAIN PRESENT: ICD-10-PCS | Mod: CPTII,S$GLB,, | Performed by: INTERNAL MEDICINE

## 2022-09-29 PROCEDURE — 99397 PR PREVENTIVE VISIT,EST,65 & OVER: ICD-10-PCS | Mod: S$GLB,,, | Performed by: INTERNAL MEDICINE

## 2022-09-29 PROCEDURE — 90694 VACC AIIV4 NO PRSRV 0.5ML IM: CPT | Mod: S$GLB,,, | Performed by: INTERNAL MEDICINE

## 2022-09-29 PROCEDURE — 3288F FALL RISK ASSESSMENT DOCD: CPT | Mod: CPTII,S$GLB,, | Performed by: INTERNAL MEDICINE

## 2022-09-29 PROCEDURE — 1125F AMNT PAIN NOTED PAIN PRSNT: CPT | Mod: CPTII,S$GLB,, | Performed by: INTERNAL MEDICINE

## 2022-09-29 PROCEDURE — 1101F PR PT FALLS ASSESS DOC 0-1 FALLS W/OUT INJ PAST YR: ICD-10-PCS | Mod: CPTII,S$GLB,, | Performed by: INTERNAL MEDICINE

## 2022-09-29 PROCEDURE — 99499 UNLISTED E&M SERVICE: CPT | Mod: HCNC,S$GLB,, | Performed by: INTERNAL MEDICINE

## 2022-09-29 PROCEDURE — 1101F PT FALLS ASSESS-DOCD LE1/YR: CPT | Mod: CPTII,S$GLB,, | Performed by: INTERNAL MEDICINE

## 2022-09-29 PROCEDURE — 99999 PR PBB SHADOW E&M-EST. PATIENT-LVL V: ICD-10-PCS | Mod: PBBFAC,,, | Performed by: INTERNAL MEDICINE

## 2022-09-29 PROCEDURE — 99397 PER PM REEVAL EST PAT 65+ YR: CPT | Mod: S$GLB,,, | Performed by: INTERNAL MEDICINE

## 2022-09-29 PROCEDURE — G0008 FLU VACCINE - QUADRIVALENT - ADJUVANTED: ICD-10-PCS | Mod: S$GLB,,, | Performed by: INTERNAL MEDICINE

## 2022-09-29 PROCEDURE — 99999 PR PBB SHADOW E&M-EST. PATIENT-LVL V: CPT | Mod: PBBFAC,,, | Performed by: INTERNAL MEDICINE

## 2022-09-29 NOTE — PROGRESS NOTES
Subjective:       Patient ID: Jenniffer Jimenez is a 89 y.o. female.    Chief Complaint: Annual Exam and Hip Pain    HPI  Patient is a 89 y.o.female with diabetic polyneuropathy associated with type 2 dm, unspecified inflammatory spondylopaty lumbar region, chronic diastolic HF, chronic atrial fib, atherosclerosis of aorta, stage 3 ckd who presents today for annual.     Requesting referral to PT for chronic left shoulder and B/L hip pain.     Labs reviewed  Vaccines up to date  Eye: randolph  mammo oct 2020  dexa 2021  Colon declines  Review of Systems   Constitutional:  Negative for activity change, appetite change, chills, diaphoresis, fatigue, fever and unexpected weight change.   HENT:  Negative for nasal congestion, mouth sores, postnasal drip, rhinorrhea, sinus pressure/congestion, sneezing, sore throat, trouble swallowing and voice change.    Eyes:  Negative for pain, discharge and visual disturbance.   Respiratory:  Negative for cough, shortness of breath and wheezing.    Cardiovascular:  Negative for chest pain, palpitations and leg swelling.   Gastrointestinal:  Negative for abdominal pain, blood in stool, constipation, diarrhea, nausea and vomiting.   Endocrine: Negative for cold intolerance and heat intolerance.   Genitourinary:  Negative for difficulty urinating, dysuria, frequency, hematuria and urgency.   Musculoskeletal:  Positive for arthralgias. Negative for myalgias.   Integumentary:  Negative for rash and wound.   Allergic/Immunologic: Negative for environmental allergies and food allergies.   Neurological:  Negative for dizziness, tremors, seizures, syncope, weakness, light-headedness and headaches.   Hematological:  Negative for adenopathy. Does not bruise/bleed easily.   Psychiatric/Behavioral:  Negative for confusion and sleep disturbance. The patient is not nervous/anxious.        Objective:      Physical Exam  Vitals and nursing note reviewed.   Constitutional:       General: She is not  in acute distress.     Appearance: Normal appearance. She is well-developed. She is not diaphoretic.   HENT:      Head: Normocephalic and atraumatic.      Right Ear: External ear normal.      Left Ear: External ear normal.      Nose: Nose normal.      Mouth/Throat:      Pharynx: No oropharyngeal exudate.   Eyes:      General: No scleral icterus.        Right eye: No discharge.         Left eye: No discharge.      Conjunctiva/sclera: Conjunctivae normal.      Pupils: Pupils are equal, round, and reactive to light.   Neck:      Thyroid: No thyromegaly.      Vascular: No JVD.   Cardiovascular:      Rate and Rhythm: Normal rate and regular rhythm.      Pulses: Normal pulses.      Heart sounds: Normal heart sounds. No murmur heard.  Pulmonary:      Effort: Pulmonary effort is normal. No respiratory distress.      Breath sounds: Normal breath sounds. No wheezing, rhonchi or rales.   Chest:      Chest wall: No tenderness.   Abdominal:      General: Bowel sounds are normal. There is no distension.      Palpations: Abdomen is soft.      Tenderness: There is no abdominal tenderness. There is no guarding or rebound.   Musculoskeletal:      Left shoulder: Tenderness present. Decreased range of motion.      Cervical back: Neck supple.      Right hip: Tenderness present.      Left hip: Tenderness present.      Right lower leg: No edema.      Left lower leg: No edema.   Lymphadenopathy:      Cervical: No cervical adenopathy.   Skin:     General: Skin is warm and dry.      Coloration: Skin is not pale.      Findings: No rash.   Neurological:      General: No focal deficit present.      Mental Status: She is alert and oriented to person, place, and time.      Gait: Gait normal.   Psychiatric:         Behavior: Behavior normal.         Thought Content: Thought content normal.         Judgment: Judgment normal.       Assessment:       Problem List Items Addressed This Visit          Neuro    History of TIA (transient ischemic attack)        Cardiac/Vascular    Pure hypercholesterolemia (Chronic)    Primary hypertension (Chronic)    Presence of Watchman left atrial appendage closure device (Chronic)    Chronic diastolic heart failure (Chronic)    Chronic atrial fibrillation (Chronic)    Atherosclerosis of aorta (Chronic)       Renal/    Stage 3b chronic kidney disease       Hematology    Senile purpura       Oncology    Normocytic normochromic anemia       Endocrine    Prediabetes       Orthopedic    Osteoporosis    Arthritis       Other    Gait instability     Other Visit Diagnoses       Chronic left shoulder pain    -  Primary    Relevant Orders    Ambulatory referral/consult to Physical/Occupational Therapy    Chronic pain of both hips        Relevant Orders    Ambulatory referral/consult to Physical/Occupational Therapy    Annual physical exam                Plan:    Blood work reviewed with pt and is stable     Continue medical management for chronic medical conditions     Referral to PT for chronic hip/shoulder pain

## 2022-10-04 ENCOUNTER — TELEPHONE (OUTPATIENT)
Dept: INTERNAL MEDICINE | Facility: CLINIC | Age: 87
End: 2022-10-04
Payer: MEDICARE

## 2022-10-04 ENCOUNTER — PATIENT MESSAGE (OUTPATIENT)
Dept: INTERNAL MEDICINE | Facility: CLINIC | Age: 87
End: 2022-10-04
Payer: MEDICARE

## 2022-10-04 RX ORDER — HYDROXYZINE HYDROCHLORIDE 25 MG/1
25 TABLET, FILM COATED ORAL 3 TIMES DAILY PRN
Qty: 90 TABLET | Refills: 0 | Status: ON HOLD | OUTPATIENT
Start: 2022-10-04 | End: 2022-10-10 | Stop reason: SDUPTHER

## 2022-10-04 NOTE — TELEPHONE ENCOUNTER
----- Message from Carissa Moore sent at 10/4/2022  8:26 AM CDT -----  Contact: pt [phone]  Per pt, she is itching everywhere. She believes it is a side effect of the medication that was used for her skin infection on her leg. She would like something called in. Pt is using   [facility]  [address]  [place] [address]  Phone: [phone] Fax: [phone]. Pt states she will not be home until 4pm but she would like this taken care of today.         Thank you

## 2022-10-04 NOTE — TELEPHONE ENCOUNTER
----- Message from Carissa Moore sent at 10/4/2022  8:26 AM CDT -----  Contact: pt [phone]  Per pt, she is itching everywhere. She believes it is a side effect of the medication that was used for her skin infection on her leg. She would like something called in. Pt is using   [facility]  [address]  [place] [address]  Phone: [phone] Fax: [phone]. Pt states she will not be home until 4pm but she would like this taken care of today.         Thank you

## 2022-10-04 NOTE — TELEPHONE ENCOUNTER
Medication sent for itching symptom. If it does not work, she will need an o/v to assess her cellulitis to determine which medications need to be ordered given her reaction to the current ones

## 2022-10-07 ENCOUNTER — TELEPHONE (OUTPATIENT)
Dept: DERMATOLOGY | Facility: CLINIC | Age: 87
End: 2022-10-07
Payer: MEDICARE

## 2022-10-07 ENCOUNTER — HOSPITAL ENCOUNTER (OUTPATIENT)
Facility: HOSPITAL | Age: 87
Discharge: HOME | End: 2022-10-10
Attending: EMERGENCY MEDICINE | Admitting: INTERNAL MEDICINE
Payer: MEDICARE

## 2022-10-07 DIAGNOSIS — L03.115 CELLULITIS OF RIGHT LOWER EXTREMITY: Primary | ICD-10-CM

## 2022-10-07 DIAGNOSIS — S81.801D LEG WOUND, RIGHT, SUBSEQUENT ENCOUNTER: ICD-10-CM

## 2022-10-07 DIAGNOSIS — R07.9 CHEST PAIN: ICD-10-CM

## 2022-10-07 DIAGNOSIS — I50.32 CHRONIC DIASTOLIC HEART FAILURE: Chronic | ICD-10-CM

## 2022-10-07 DIAGNOSIS — I50.9 HEART FAILURE: ICD-10-CM

## 2022-10-07 DIAGNOSIS — I48.20 CHRONIC ATRIAL FIBRILLATION: ICD-10-CM

## 2022-10-07 DIAGNOSIS — I87.2 VENOUS INSUFFICIENCY OF BOTH LOWER EXTREMITIES: ICD-10-CM

## 2022-10-07 DIAGNOSIS — H81.09 MENIERE'S DISEASE, UNSPECIFIED LATERALITY: ICD-10-CM

## 2022-10-07 LAB
ALBUMIN SERPL BCP-MCNC: 4.2 G/DL (ref 3.5–5.2)
ALP SERPL-CCNC: 129 U/L (ref 55–135)
ALT SERPL W/O P-5'-P-CCNC: 12 U/L (ref 10–44)
ANION GAP SERPL CALC-SCNC: 11 MMOL/L (ref 8–16)
AST SERPL-CCNC: 16 U/L (ref 10–40)
BASOPHILS # BLD AUTO: 0.04 K/UL (ref 0–0.2)
BASOPHILS NFR BLD: 0.8 % (ref 0–1.9)
BILIRUB SERPL-MCNC: 0.9 MG/DL (ref 0.1–1)
BUN SERPL-MCNC: 33 MG/DL (ref 6–30)
BUN SERPL-MCNC: 36 MG/DL (ref 8–23)
CALCIUM SERPL-MCNC: 9.9 MG/DL (ref 8.7–10.5)
CHLORIDE SERPL-SCNC: 105 MMOL/L (ref 95–110)
CHLORIDE SERPL-SCNC: 107 MMOL/L (ref 95–110)
CO2 SERPL-SCNC: 22 MMOL/L (ref 23–29)
CREAT SERPL-MCNC: 1.2 MG/DL (ref 0.5–1.4)
CREAT SERPL-MCNC: 1.2 MG/DL (ref 0.5–1.4)
DIFFERENTIAL METHOD: ABNORMAL
EOSINOPHIL # BLD AUTO: 0.1 K/UL (ref 0–0.5)
EOSINOPHIL NFR BLD: 2.3 % (ref 0–8)
ERYTHROCYTE [DISTWIDTH] IN BLOOD BY AUTOMATED COUNT: 15.3 % (ref 11.5–14.5)
EST. GFR  (NO RACE VARIABLE): 43 ML/MIN/1.73 M^2
GLUCOSE SERPL-MCNC: 78 MG/DL (ref 70–110)
GLUCOSE SERPL-MCNC: 80 MG/DL (ref 70–110)
HCT VFR BLD AUTO: 33.4 % (ref 37–48.5)
HCT VFR BLD CALC: 36 %PCV (ref 36–54)
HGB BLD-MCNC: 11.3 G/DL (ref 12–16)
IMM GRANULOCYTES # BLD AUTO: 0.01 K/UL (ref 0–0.04)
IMM GRANULOCYTES NFR BLD AUTO: 0.2 % (ref 0–0.5)
LACTATE SERPL-SCNC: 1.3 MMOL/L (ref 0.5–2.2)
LYMPHOCYTES # BLD AUTO: 0.8 K/UL (ref 1–4.8)
LYMPHOCYTES NFR BLD: 16.7 % (ref 18–48)
MCH RBC QN AUTO: 29.2 PG (ref 27–31)
MCHC RBC AUTO-ENTMCNC: 33.8 G/DL (ref 32–36)
MCV RBC AUTO: 86 FL (ref 82–98)
MONOCYTES # BLD AUTO: 0.5 K/UL (ref 0.3–1)
MONOCYTES NFR BLD: 9.5 % (ref 4–15)
NEUTROPHILS # BLD AUTO: 3.3 K/UL (ref 1.8–7.7)
NEUTROPHILS NFR BLD: 70.5 % (ref 38–73)
NRBC BLD-RTO: 0 /100 WBC
PLATELET # BLD AUTO: 183 K/UL (ref 150–450)
PMV BLD AUTO: 10.7 FL (ref 9.2–12.9)
POC IONIZED CALCIUM: 1.17 MMOL/L (ref 1.06–1.42)
POC TCO2 (MEASURED): 23 MMOL/L (ref 23–29)
POTASSIUM BLD-SCNC: 4.1 MMOL/L (ref 3.5–5.1)
POTASSIUM SERPL-SCNC: 4.2 MMOL/L (ref 3.5–5.1)
PROT SERPL-MCNC: 7.4 G/DL (ref 6–8.4)
RBC # BLD AUTO: 3.87 M/UL (ref 4–5.4)
SAMPLE: ABNORMAL
SODIUM BLD-SCNC: 139 MMOL/L (ref 136–145)
SODIUM SERPL-SCNC: 140 MMOL/L (ref 136–145)
WBC # BLD AUTO: 4.72 K/UL (ref 3.9–12.7)

## 2022-10-07 PROCEDURE — G0378 HOSPITAL OBSERVATION PER HR: HCPCS

## 2022-10-07 PROCEDURE — 84145 PROCALCITONIN (PCT): CPT | Performed by: INTERNAL MEDICINE

## 2022-10-07 PROCEDURE — 85025 COMPLETE CBC W/AUTO DIFF WBC: CPT | Performed by: EMERGENCY MEDICINE

## 2022-10-07 PROCEDURE — 82330 ASSAY OF CALCIUM: CPT

## 2022-10-07 PROCEDURE — 83605 ASSAY OF LACTIC ACID: CPT | Performed by: EMERGENCY MEDICINE

## 2022-10-07 PROCEDURE — 80047 BASIC METABLC PNL IONIZED CA: CPT

## 2022-10-07 PROCEDURE — 84443 ASSAY THYROID STIM HORMONE: CPT | Performed by: INTERNAL MEDICINE

## 2022-10-07 PROCEDURE — 99285 EMERGENCY DEPT VISIT HI MDM: CPT | Mod: 25

## 2022-10-07 PROCEDURE — 84484 ASSAY OF TROPONIN QUANT: CPT | Performed by: INTERNAL MEDICINE

## 2022-10-07 PROCEDURE — 25000003 PHARM REV CODE 250: Performed by: INTERNAL MEDICINE

## 2022-10-07 PROCEDURE — 83880 ASSAY OF NATRIURETIC PEPTIDE: CPT | Performed by: INTERNAL MEDICINE

## 2022-10-07 PROCEDURE — 80053 COMPREHEN METABOLIC PANEL: CPT | Performed by: EMERGENCY MEDICINE

## 2022-10-07 PROCEDURE — 99285 PR EMERGENCY DEPT VISIT,LEVEL V: ICD-10-PCS | Mod: ,,, | Performed by: EMERGENCY MEDICINE

## 2022-10-07 PROCEDURE — 36415 COLL VENOUS BLD VENIPUNCTURE: CPT | Performed by: INTERNAL MEDICINE

## 2022-10-07 PROCEDURE — 82728 ASSAY OF FERRITIN: CPT | Performed by: INTERNAL MEDICINE

## 2022-10-07 PROCEDURE — 96365 THER/PROPH/DIAG IV INF INIT: CPT

## 2022-10-07 PROCEDURE — 99285 EMERGENCY DEPT VISIT HI MDM: CPT | Mod: ,,, | Performed by: EMERGENCY MEDICINE

## 2022-10-07 PROCEDURE — 84466 ASSAY OF TRANSFERRIN: CPT | Performed by: INTERNAL MEDICINE

## 2022-10-07 PROCEDURE — 25000003 PHARM REV CODE 250: Performed by: EMERGENCY MEDICINE

## 2022-10-07 PROCEDURE — 82565 ASSAY OF CREATININE: CPT

## 2022-10-07 PROCEDURE — 96366 THER/PROPH/DIAG IV INF ADDON: CPT

## 2022-10-07 RX ORDER — GLUCAGON 1 MG
1 KIT INJECTION
Status: DISCONTINUED | OUTPATIENT
Start: 2022-10-07 | End: 2022-10-10 | Stop reason: HOSPADM

## 2022-10-07 RX ORDER — CLINDAMYCIN PHOSPHATE 600 MG/50ML
600 INJECTION, SOLUTION INTRAVENOUS
Status: DISCONTINUED | OUTPATIENT
Start: 2022-10-07 | End: 2022-10-09

## 2022-10-07 RX ORDER — HYDROXYZINE HYDROCHLORIDE 25 MG/1
25 TABLET, FILM COATED ORAL 3 TIMES DAILY PRN
Status: DISCONTINUED | OUTPATIENT
Start: 2022-10-07 | End: 2022-10-10 | Stop reason: HOSPADM

## 2022-10-07 RX ORDER — CHOLECALCIFEROL (VITAMIN D3) 25 MCG
1000 TABLET ORAL DAILY
Status: DISCONTINUED | OUTPATIENT
Start: 2022-10-08 | End: 2022-10-10 | Stop reason: HOSPADM

## 2022-10-07 RX ORDER — FUROSEMIDE 20 MG/1
20 TABLET ORAL
Status: DISCONTINUED | OUTPATIENT
Start: 2022-10-08 | End: 2022-10-09

## 2022-10-07 RX ORDER — ONDANSETRON 2 MG/ML
4 INJECTION INTRAMUSCULAR; INTRAVENOUS EVERY 8 HOURS PRN
Status: DISCONTINUED | OUTPATIENT
Start: 2022-10-07 | End: 2022-10-10 | Stop reason: HOSPADM

## 2022-10-07 RX ORDER — SPIRONOLACTONE 25 MG/1
25 TABLET ORAL
Status: DISCONTINUED | OUTPATIENT
Start: 2022-10-08 | End: 2022-10-08

## 2022-10-07 RX ORDER — ENOXAPARIN SODIUM 100 MG/ML
40 INJECTION SUBCUTANEOUS EVERY 24 HOURS
Status: DISCONTINUED | OUTPATIENT
Start: 2022-10-07 | End: 2022-10-09

## 2022-10-07 RX ORDER — PROCHLORPERAZINE EDISYLATE 5 MG/ML
5 INJECTION INTRAMUSCULAR; INTRAVENOUS EVERY 6 HOURS PRN
Status: DISCONTINUED | OUTPATIENT
Start: 2022-10-07 | End: 2022-10-10 | Stop reason: HOSPADM

## 2022-10-07 RX ORDER — ACETAMINOPHEN 325 MG/1
650 TABLET ORAL EVERY 4 HOURS PRN
Status: DISCONTINUED | OUTPATIENT
Start: 2022-10-07 | End: 2022-10-10 | Stop reason: HOSPADM

## 2022-10-07 RX ORDER — PRAVASTATIN SODIUM 40 MG/1
40 TABLET ORAL DAILY
Status: DISCONTINUED | OUTPATIENT
Start: 2022-10-08 | End: 2022-10-09

## 2022-10-07 RX ORDER — POLYETHYLENE GLYCOL 3350 17 G/17G
17 POWDER, FOR SOLUTION ORAL 2 TIMES DAILY PRN
Status: DISCONTINUED | OUTPATIENT
Start: 2022-10-07 | End: 2022-10-10 | Stop reason: HOSPADM

## 2022-10-07 RX ORDER — IBUPROFEN 200 MG
24 TABLET ORAL
Status: DISCONTINUED | OUTPATIENT
Start: 2022-10-07 | End: 2022-10-10 | Stop reason: HOSPADM

## 2022-10-07 RX ORDER — TALC
6 POWDER (GRAM) TOPICAL NIGHTLY PRN
Status: DISCONTINUED | OUTPATIENT
Start: 2022-10-07 | End: 2022-10-10 | Stop reason: HOSPADM

## 2022-10-07 RX ORDER — VITAMIN E 268 MG
400 CAPSULE ORAL DAILY
Status: DISCONTINUED | OUTPATIENT
Start: 2022-10-08 | End: 2022-10-10 | Stop reason: HOSPADM

## 2022-10-07 RX ORDER — CLINDAMYCIN PHOSPHATE 150 MG/ML
600 INJECTION, SOLUTION INTRAVENOUS
Status: DISCONTINUED | OUTPATIENT
Start: 2022-10-07 | End: 2022-10-07

## 2022-10-07 RX ORDER — CLINDAMYCIN PHOSPHATE 600 MG/50ML
600 INJECTION, SOLUTION INTRAVENOUS
Status: COMPLETED | OUTPATIENT
Start: 2022-10-07 | End: 2022-10-07

## 2022-10-07 RX ORDER — IBUPROFEN 200 MG
16 TABLET ORAL
Status: DISCONTINUED | OUTPATIENT
Start: 2022-10-07 | End: 2022-10-10 | Stop reason: HOSPADM

## 2022-10-07 RX ORDER — ASPIRIN 81 MG/1
81 TABLET ORAL DAILY
Status: DISCONTINUED | OUTPATIENT
Start: 2022-10-08 | End: 2022-10-10 | Stop reason: HOSPADM

## 2022-10-07 RX ORDER — AZELASTINE 1 MG/ML
1 SPRAY, METERED NASAL 2 TIMES DAILY
Status: DISCONTINUED | OUTPATIENT
Start: 2022-10-07 | End: 2022-10-10 | Stop reason: HOSPADM

## 2022-10-07 RX ORDER — SODIUM CHLORIDE 0.9 % (FLUSH) 0.9 %
10 SYRINGE (ML) INJECTION EVERY 12 HOURS PRN
Status: DISCONTINUED | OUTPATIENT
Start: 2022-10-07 | End: 2022-10-10 | Stop reason: HOSPADM

## 2022-10-07 RX ORDER — NALOXONE HCL 0.4 MG/ML
0.02 VIAL (ML) INJECTION
Status: DISCONTINUED | OUTPATIENT
Start: 2022-10-07 | End: 2022-10-10 | Stop reason: HOSPADM

## 2022-10-07 RX ADMIN — CLINDAMYCIN IN 5 PERCENT DEXTROSE 600 MG: 12 INJECTION, SOLUTION INTRAVENOUS at 05:10

## 2022-10-07 RX ADMIN — AZELASTINE HYDROCHLORIDE 137 MCG: 137 SPRAY, METERED NASAL at 11:10

## 2022-10-07 RX ADMIN — CLINDAMYCIN IN 5 PERCENT DEXTROSE 600 MG: 12 INJECTION, SOLUTION INTRAVENOUS at 11:10

## 2022-10-07 NOTE — Clinical Note
Diagnosis: Leg wound, right, subsequent encounter [114051]   Future Attending Provider: SOPHY SIMPSON [[id number]]   Admitting Provider:: SOPHY SIMPSON [[id number]]

## 2022-10-07 NOTE — ED NOTES
Pt identifiers Dime Boxshade Jimenez were checked and care correct  LOC: The patient is awake, alert, aware of environment with an appropriate affect. Oriented x4 speaking appropriately  APPEARANCE: Pt resting comfortably, in no acute distress, pt is clean and well groomed, clothing properly fastened  SKIN: Skin warm, dry and intact, normal skin turgor, moist mucus membranes  Dry skin, redness and swelling noted to right lower extremity  RESPIRATORY: Airway is open and patent, respirations are spontaneous, even and unlabored, normal effort and rate  CARDIAC: radial pulses strong and irregular , 1 +  peripheral edema noted, capillary refill < 3 seconds, bilateral radial pulses 2+  ABDOMEN: Soft, nontender, nondistended. Bowel sounds present to all four quad of abd on auscultation  NEUROLOGIC: PERRL, facial expression is symmetrical, patient moving all extremities spontaneously, normal sensation in all extremities when touched with a finger.  Follows all commands appropriately  MUSCULOSKELETAL: No obvious deformities.

## 2022-10-07 NOTE — ED TRIAGE NOTES
Pt complains of redness and swelling to right lower leg times one month but has gotten worse over the past two to three weeks

## 2022-10-07 NOTE — ED PROVIDER NOTES
"Encounter Date: 10/7/2022       History     Chief Complaint   Patient presents with    Wound Infection     To right leg, taking PO ABX and finished 4 days      Pt is a 89 yo F with PMH of recent R leg cellulitis who presents with persistent pain, redness to RLE  She has taken augmentin then switched to keflex and doxycycline- she notes persistent swelling and increasing pain and redness    The history is provided by the patient and medical records.   Review of patient's allergies indicates:   Allergen Reactions    Opioids - morphine analogues Other (See Comments)     Bowel issues; bowel obstruction    Tizanidine Other (See Comments)     "Lips were numb,  Almost passed out."    Tramadol Hallucinations    Beta-blockers (beta-adrenergic blocking agts) Other (See Comments)     Can not go on beta blockers for long period of time - due to taking allergy injections    Morphine     Opioids-meperidine and related      Past Medical History:   Diagnosis Date    Amblyopia of left eye 4/10/2013    Anxiety     Arthritis     Facet arthropathy, Lumbosacral    Cataract     Central retinal vein occlusion of left eye     Depression     Diabetic polyneuropathy 2022    Exotropia of both eyes 2013    recession RSR 5.0mm w/ adj; recession LR os 5.0 w/ adj; resect MR os  4.0mm    Hearing loss     History of resection of small bowel     Hypertensive retinopathy of both eyes     Hypoglycemia     Macular degeneration     OA (osteoarthritis) of shoulder     Right    Osteoporosis     Posterior vitreous detachment of both eyes     Rhinitis     TIA (transient ischemic attack)      Past Surgical History:   Procedure Laterality Date    APPENDECTOMY      CARDIAC CATHETERIZATION      CATARACT EXTRACTION W/  INTRAOCULAR LENS IMPLANT Bilateral      SECTION, CLASSIC      CLOSURE OF LEFT ATRIAL APPENDAGE USING DEVICE N/A 2020    Procedure: Left atrial appendage closure device;  Surgeon: Abundio Curtis MD;  Location: [facility] CATH LAB; "  Service: Cardiology;  Laterality: N/A;    HYSTERECTOMY      INNER EAR SURGERY      JOINT REPLACEMENT      LEFT KNEE REPLACEMENT IN 2013 -    OOPHORECTOMY      SINUS SURGERY      STRABISMUS SURGERY  13    RSR recession 5 mm, LLR recession 5 mm and LMR resection 4mm    STRABISMUS SURGERY  2014    recess LR OD 6mm    TONSILLECTOMY      watchman surgery N/A 2020     Family History   Problem Relation Age of Onset    Hypertension Mother     Hypertension Father     Liver disease Sister     Diabetes Sister     Heart disease Sister     Diabetes Brother     Breast cancer Maternal Aunt     No Known Problems Maternal Uncle     No Known Problems Paternal Aunt     No Known Problems Paternal Uncle     No Known Problems Maternal Grandmother     No Known Problems Maternal Grandfather     No Known Problems Paternal Grandmother     No Known Problems Paternal Grandfather     No Known Problems Daughter     No Known Problems Son     Heart disease Sister     No Known Problems Son     No Known Problems Son     Cancer Neg Hx         in first degree relatives    Melanoma Neg Hx     Psoriasis Neg Hx     Lupus Neg Hx     Amblyopia Neg Hx     Blindness Neg Hx     Cataracts Neg Hx     Glaucoma Neg Hx     Macular degeneration Neg Hx     Retinal detachment Neg Hx     Strabismus Neg Hx     Stroke Neg Hx     Thyroid disease Neg Hx      Social History     Tobacco Use    Smoking status: Former     Types: Cigarettes     Quit date: 10/29/1982     Years since quittin.9    Smokeless tobacco: Never   Substance Use Topics    Alcohol use: Yes     Alcohol/week: 2.0 standard drinks     Types: 2 Shots of liquor per week     Comment: rare    Drug use: No     Review of Systems  General: No fever.  No chills.  Eyes: No visual changes.  Head: No headache.    Integument: + redness to skin to right lower leg with crusting  Chest: No shortness of breath.  Cardiovascular: No chest pain.  Abdomen: No abdominal pain.  No nausea or vomiting.  Urinary:  No abnormal urination.  Neurologic: No focal weakness.  No numbness.  Hematologic: No easy bruising.  Endocrine: No excessive thirst or urination.    Physical Exam     Initial Vitals [10/07/22 1405]   BP Pulse Resp Temp SpO2   133/61 81 18 98.3 °F (36.8 °C) 97 %      MAP       --         Physical Exam    Nursing note and vitals reviewed.  Constitutional: She appears well-developed and well-nourished. She is not diaphoretic. No distress.   HENT:   Head: Normocephalic and atraumatic.   Right Ear: External ear normal.   Left Ear: External ear normal.   Eyes: Conjunctivae and EOM are normal.   Neck: Neck supple.   Normal range of motion.  Cardiovascular:  Normal rate, regular rhythm and intact distal pulses.           Pulmonary/Chest: No respiratory distress.   Abdominal: Abdomen is soft. She exhibits no distension. There is no abdominal tenderness.   Musculoskeletal:         General: Edema (R leg) present. Normal range of motion.      Cervical back: Normal range of motion and neck supple.     Neurological: She is alert and oriented to person, place, and time. GCS score is 15. GCS eye subscore is 4. GCS verbal subscore is 5. GCS motor subscore is 6.   Skin: Skin is warm and dry.   See photo below  R lower leg is warm, erythematous, tender   Psychiatric: She has a normal mood and affect. Her behavior is normal. Judgment and thought content normal.             ED Course   Procedures  Labs Reviewed   COMPREHENSIVE METABOLIC PANEL - Abnormal; Notable for the following components:       Result Value    CO2 22 (*)     BUN 36 (*)     eGFR 43.0 (*)     All other components within normal limits   CBC W/ AUTO DIFFERENTIAL - Abnormal; Notable for the following components:    RBC 3.87 (*)     Hemoglobin 11.3 (*)     Hematocrit 33.4 (*)     RDW 15.3 (*)     Lymph # 0.8 (*)     Lymph % 16.7 (*)     All other components within normal limits   ISTAT PROCEDURE - Abnormal; Notable for the following components:    POC BUN 33 (*)     All  other components within normal limits   LACTIC ACID, PLASMA   ISTAT CHEM8          Imaging Results              X-Ray Tibia Fibula 2 View Right (Final result)  Result time 10/07/22 17:37:47      Final result by Austin Us MD (10/07/22 17:37:47)                   Impression:      No acute process.      Electronically signed by: Austin Us MD  Date:    10/07/2022  Time:    17:37               Narrative:    EXAMINATION:  XR TIBIA FIBULA 2 VIEW RIGHT    CLINICAL HISTORY:  Unspecified open wound, right lower leg, subsequent encounter    TECHNIQUE:  AP and lateral views of the right tibia and fibula were performed.    COMPARISON:  01/01/2020.    FINDINGS:  The bone mineralization is within normal limits.  There is no cortical step-off.  There is no evidence of periostitis.    The joint spaces are maintained.  There are vascular calcifications.  The soft tissues are otherwise unremarkable.    There is no evidence of a fracture or dislocation of the right tibia/fibula.                                       Medications   aspirin EC tablet 81 mg (has no administration in time range)   azelastine 137 mcg (0.1 %) nasal spray 137 mcg (has no administration in time range)   vitamin D 1000 units tablet 1,000 Units (has no administration in time range)   furosemide tablet 20 mg (has no administration in time range)   hydrOXYzine HCL tablet 25 mg (has no administration in time range)   pravastatin tablet 40 mg (has no administration in time range)   psyllium husk (aspartame) 3.4 gram packet 1 packet (has no administration in time range)   spironolactone tablet 25 mg (has no administration in time range)   vitamin E capsule 400 Units (has no administration in time range)   sodium chloride 0.9% flush 10 mL (has no administration in time range)   naloxone 0.4 mg/mL injection 0.02 mg (has no administration in time range)   glucose chewable tablet 16 g (has no administration in time range)   glucose chewable tablet 24 g (has no  administration in time range)   glucagon (human recombinant) injection 1 mg (has no administration in time range)   dextrose 10% bolus 125 mL (has no administration in time range)   dextrose 10% bolus 250 mL (has no administration in time range)   enoxaparin injection 40 mg (has no administration in time range)   acetaminophen tablet 650 mg (has no administration in time range)   polyethylene glycol packet 17 g (has no administration in time range)   ondansetron injection 4 mg (has no administration in time range)   prochlorperazine injection Soln 5 mg (has no administration in time range)   melatonin tablet 6 mg (has no administration in time range)   clindamycin in D5W 600 mg/50 mL IVPB 600 mg (has no administration in time range)   clindamycin in D5W 600 mg/50 mL IVPB 600 mg (0 mg Intravenous Stopped 10/7/22 7970)     Medical Decision Making:   History:   Old Medical Records: I decided to obtain old medical records.  Old Records Summarized: records from clinic visits and records from previous admission(s).       <> Summary of Records: US LE bilaterally  Differential Diagnosis:   Cellulitis, abscess, shingles, necrotizing fasciitis, eczema, impetigo  Clinical Tests:   Lab Tests: Reviewed and Ordered  Radiological Study: Ordered and Reviewed  ED Management:  Persistent RLE cellulitis despite outpatient antibiotics including augmentin, keflex, doxycycline  Pt is well appearing, nontoxic, does not appear septic  Started on clindamycin iv and place in observation with hospital medicine                        Clinical Impression:   Final diagnoses:  [S81.801D] Leg wound, right, subsequent encounter  [L03.115] Cellulitis of right lower extremity (Primary)      ED Disposition Condition    Observation Stable                Brittani Herrera MD  10/07/22 3121

## 2022-10-07 NOTE — TELEPHONE ENCOUNTER
Called pt . Pt stated that her cellulitis was really bad and she needed an appt today. There are no providers currently in office that have availability.  I suggested the ER if the leg was as bad as the patient stated.

## 2022-10-07 NOTE — ED NOTES
I-STAT Chem-8+ Results:   Value Reference Range   Sodium 139 136-145 mmol/L   Potassium  4.1 3.5-5.1 mmol/L   Chloride 105  mmol/L   Ionized Calcium 1.17 1.06-1.42 mmol/L   CO2 (measured) 23 23-29 mmol/L   Glucose 80  mg/dL   BUN 33 6-30 mg/dL   Creatinine 1.2 0.5-1.4 mg/dL   Hematocrit 36 36-54%

## 2022-10-08 LAB
ALBUMIN SERPL BCP-MCNC: 3.1 G/DL (ref 3.5–5.2)
ALP SERPL-CCNC: 102 U/L (ref 55–135)
ALT SERPL W/O P-5'-P-CCNC: 9 U/L (ref 10–44)
ANION GAP SERPL CALC-SCNC: 9 MMOL/L (ref 8–16)
ASCENDING AORTA: 3.27 CM
AST SERPL-CCNC: 13 U/L (ref 10–40)
AV INDEX (PROSTH): 0.55
AV MEAN GRADIENT: 5 MMHG
AV PEAK GRADIENT: 8 MMHG
AV VALVE AREA: 1.77 CM2
AV VELOCITY RATIO: 0.54
BASOPHILS # BLD AUTO: 0.03 K/UL (ref 0–0.2)
BASOPHILS NFR BLD: 1 % (ref 0–1.9)
BILIRUB SERPL-MCNC: 0.5 MG/DL (ref 0.1–1)
BNP SERPL-MCNC: 139 PG/ML (ref 0–99)
BSA FOR ECHO PROCEDURE: 1.84 M2
BUN SERPL-MCNC: 33 MG/DL (ref 8–23)
CALCIUM SERPL-MCNC: 8.7 MG/DL (ref 8.7–10.5)
CHLORIDE SERPL-SCNC: 106 MMOL/L (ref 95–110)
CO2 SERPL-SCNC: 20 MMOL/L (ref 23–29)
CREAT SERPL-MCNC: 1.1 MG/DL (ref 0.5–1.4)
CV ECHO LV RWT: 0.53 CM
DIFFERENTIAL METHOD: ABNORMAL
DOP CALC AO PEAK VEL: 1.45 M/S
DOP CALC AO VTI: 36.64 CM
DOP CALC LVOT AREA: 3.2 CM2
DOP CALC LVOT DIAMETER: 2.02 CM
DOP CALC LVOT PEAK VEL: 0.78 M/S
DOP CALC LVOT STROKE VOLUME: 64.86 CM3
DOP CALCLVOT PEAK VEL VTI: 20.25 CM
E WAVE DECELERATION TIME: 150.75 MSEC
E/A RATIO: 3
E/E' RATIO: 11.45 M/S
ECHO LV POSTERIOR WALL: 0.96 CM (ref 0.6–1.1)
EJECTION FRACTION: 55 %
EOSINOPHIL # BLD AUTO: 0.1 K/UL (ref 0–0.5)
EOSINOPHIL NFR BLD: 3.3 % (ref 0–8)
ERYTHROCYTE [DISTWIDTH] IN BLOOD BY AUTOMATED COUNT: 15.4 % (ref 11.5–14.5)
EST. GFR  (NO RACE VARIABLE): 47.7 ML/MIN/1.73 M^2
FERRITIN SERPL-MCNC: 91 NG/ML (ref 20–300)
FRACTIONAL SHORTENING: 28 % (ref 28–44)
GLUCOSE SERPL-MCNC: 114 MG/DL (ref 70–110)
HCT VFR BLD AUTO: 30.2 % (ref 37–48.5)
HGB BLD-MCNC: 9.7 G/DL (ref 12–16)
IMM GRANULOCYTES # BLD AUTO: 0.04 K/UL (ref 0–0.04)
IMM GRANULOCYTES NFR BLD AUTO: 1.3 % (ref 0–0.5)
INTERVENTRICULAR SEPTUM: 0.9 CM (ref 0.6–1.1)
IRON SERPL-MCNC: 49 UG/DL (ref 30–160)
IVRT: 79.92 MSEC
LA MAJOR: 7 CM
LA MINOR: 6.71 CM
LA WIDTH: 4.06 CM
LEFT ATRIUM SIZE: 3.88 CM
LEFT ATRIUM VOLUME INDEX MOD: 55.3 ML/M2
LEFT ATRIUM VOLUME INDEX: 50.7 ML/M2
LEFT ATRIUM VOLUME MOD: 100.16 CM3
LEFT ATRIUM VOLUME: 91.75 CM3
LEFT INTERNAL DIMENSION IN SYSTOLE: 2.62 CM (ref 2.1–4)
LEFT VENTRICLE DIASTOLIC VOLUME INDEX: 31.13 ML/M2
LEFT VENTRICLE DIASTOLIC VOLUME: 56.35 ML
LEFT VENTRICLE MASS INDEX: 55 G/M2
LEFT VENTRICLE SYSTOLIC VOLUME INDEX: 13.8 ML/M2
LEFT VENTRICLE SYSTOLIC VOLUME: 24.98 ML
LEFT VENTRICULAR INTERNAL DIMENSION IN DIASTOLE: 3.65 CM (ref 3.5–6)
LEFT VENTRICULAR MASS: 99.32 G
LV LATERAL E/E' RATIO: 10.5 M/S
LV SEPTAL E/E' RATIO: 12.6 M/S
LYMPHOCYTES # BLD AUTO: 0.7 K/UL (ref 1–4.8)
LYMPHOCYTES NFR BLD: 21.8 % (ref 18–48)
MAGNESIUM SERPL-MCNC: 2.1 MG/DL (ref 1.6–2.6)
MCH RBC QN AUTO: 29.3 PG (ref 27–31)
MCHC RBC AUTO-ENTMCNC: 32.1 G/DL (ref 32–36)
MCV RBC AUTO: 91 FL (ref 82–98)
MONOCYTES # BLD AUTO: 0.4 K/UL (ref 0.3–1)
MONOCYTES NFR BLD: 13.7 % (ref 4–15)
MV PEAK A VEL: 0.42 M/S
MV PEAK E VEL: 1.26 M/S
MV STENOSIS PRESSURE HALF TIME: 43.72 MS
MV VALVE AREA P 1/2 METHOD: 5.03 CM2
NEUTROPHILS # BLD AUTO: 1.8 K/UL (ref 1.8–7.7)
NEUTROPHILS NFR BLD: 58.9 % (ref 38–73)
NRBC BLD-RTO: 0 /100 WBC
PHOSPHATE SERPL-MCNC: 3.5 MG/DL (ref 2.7–4.5)
PISA TR MAX VEL: 3.05 M/S
PLATELET # BLD AUTO: 130 K/UL (ref 150–450)
PMV BLD AUTO: 11.2 FL (ref 9.2–12.9)
POTASSIUM SERPL-SCNC: 4.5 MMOL/L (ref 3.5–5.1)
PROCALCITONIN SERPL IA-MCNC: 0.06 NG/ML
PROT SERPL-MCNC: 5.5 G/DL (ref 6–8.4)
RA MAJOR: 6.35 CM
RA PRESSURE: 15 MMHG
RA WIDTH: 5.07 CM
RBC # BLD AUTO: 3.31 M/UL (ref 4–5.4)
RIGHT VENTRICULAR END-DIASTOLIC DIMENSION: 4.1 CM
RV TISSUE DOPPLER FREE WALL SYSTOLIC VELOCITY 1 (APICAL 4 CHAMBER VIEW): 7.86 CM/S
SATURATED IRON: 14 % (ref 20–50)
SINUS: 2.83 CM
SODIUM SERPL-SCNC: 135 MMOL/L (ref 136–145)
STJ: 2.6 CM
TDI LATERAL: 0.12 M/S
TDI SEPTAL: 0.1 M/S
TDI: 0.11 M/S
TOTAL IRON BINDING CAPACITY: 355 UG/DL (ref 250–450)
TR MAX PG: 37 MMHG
TRANSFERRIN SERPL-MCNC: 240 MG/DL (ref 200–375)
TRICUSPID ANNULAR PLANE SYSTOLIC EXCURSION: 1.39 CM
TROPONIN I SERPL DL<=0.01 NG/ML-MCNC: <0.006 NG/ML (ref 0–0.03)
TSH SERPL DL<=0.005 MIU/L-ACNC: 2.19 UIU/ML (ref 0.4–4)
TV REST PULMONARY ARTERY PRESSURE: 52 MMHG
WBC # BLD AUTO: 3.07 K/UL (ref 3.9–12.7)

## 2022-10-08 PROCEDURE — G0378 HOSPITAL OBSERVATION PER HR: HCPCS

## 2022-10-08 PROCEDURE — 99225 PR SUBSEQUENT OBSERVATION CARE,LEVEL II: CPT | Mod: ,,, | Performed by: INTERNAL MEDICINE

## 2022-10-08 PROCEDURE — 96372 THER/PROPH/DIAG INJ SC/IM: CPT | Performed by: INTERNAL MEDICINE

## 2022-10-08 PROCEDURE — 84100 ASSAY OF PHOSPHORUS: CPT | Performed by: INTERNAL MEDICINE

## 2022-10-08 PROCEDURE — 80053 COMPREHEN METABOLIC PANEL: CPT | Performed by: INTERNAL MEDICINE

## 2022-10-08 PROCEDURE — 63600175 PHARM REV CODE 636 W HCPCS: Performed by: INTERNAL MEDICINE

## 2022-10-08 PROCEDURE — 25000242 PHARM REV CODE 250 ALT 637 W/ HCPCS: Performed by: INTERNAL MEDICINE

## 2022-10-08 PROCEDURE — 99225 PR SUBSEQUENT OBSERVATION CARE,LEVEL II: ICD-10-PCS | Mod: ,,, | Performed by: INTERNAL MEDICINE

## 2022-10-08 PROCEDURE — 83735 ASSAY OF MAGNESIUM: CPT | Performed by: INTERNAL MEDICINE

## 2022-10-08 PROCEDURE — 85025 COMPLETE CBC W/AUTO DIFF WBC: CPT | Performed by: INTERNAL MEDICINE

## 2022-10-08 PROCEDURE — 25000003 PHARM REV CODE 250: Performed by: PHYSICIAN ASSISTANT

## 2022-10-08 PROCEDURE — 25000003 PHARM REV CODE 250: Performed by: INTERNAL MEDICINE

## 2022-10-08 PROCEDURE — 36415 COLL VENOUS BLD VENIPUNCTURE: CPT | Performed by: INTERNAL MEDICINE

## 2022-10-08 PROCEDURE — 96366 THER/PROPH/DIAG IV INF ADDON: CPT

## 2022-10-08 PROCEDURE — 96365 THER/PROPH/DIAG IV INF INIT: CPT | Mod: 59

## 2022-10-08 RX ORDER — GUAIFENESIN 100 MG/5ML
200 SOLUTION ORAL EVERY 6 HOURS PRN
Status: DISCONTINUED | OUTPATIENT
Start: 2022-10-08 | End: 2022-10-10 | Stop reason: HOSPADM

## 2022-10-08 RX ADMIN — CLINDAMYCIN IN 5 PERCENT DEXTROSE 600 MG: 12 INJECTION, SOLUTION INTRAVENOUS at 05:10

## 2022-10-08 RX ADMIN — GUAIFENESIN 200 MG: 200 SOLUTION ORAL at 09:10

## 2022-10-08 RX ADMIN — ASPIRIN 81 MG: 81 TABLET, COATED ORAL at 09:10

## 2022-10-08 RX ADMIN — PRAVASTATIN SODIUM 40 MG: 40 TABLET ORAL at 09:10

## 2022-10-08 RX ADMIN — ENOXAPARIN SODIUM 40 MG: 100 INJECTION SUBCUTANEOUS at 05:10

## 2022-10-08 RX ADMIN — Medication 400 UNITS: at 09:10

## 2022-10-08 RX ADMIN — AZELASTINE HYDROCHLORIDE 137 MCG: 137 SPRAY, METERED NASAL at 09:10

## 2022-10-08 RX ADMIN — HYDROXYZINE HYDROCHLORIDE 25 MG: 25 TABLET, FILM COATED ORAL at 12:10

## 2022-10-08 RX ADMIN — ACETAMINOPHEN 650 MG: 325 TABLET ORAL at 08:10

## 2022-10-08 RX ADMIN — SPIRONOLACTONE 25 MG: 25 TABLET, FILM COATED ORAL at 06:10

## 2022-10-08 RX ADMIN — AZELASTINE HYDROCHLORIDE 137 MCG: 137 SPRAY, METERED NASAL at 08:10

## 2022-10-08 RX ADMIN — CLINDAMYCIN IN 5 PERCENT DEXTROSE 600 MG: 12 INJECTION, SOLUTION INTRAVENOUS at 12:10

## 2022-10-08 RX ADMIN — CLINDAMYCIN IN 5 PERCENT DEXTROSE 600 MG: 12 INJECTION, SOLUTION INTRAVENOUS at 03:10

## 2022-10-08 RX ADMIN — PSYLLIUM HUSK 1 PACKET: 3.4 POWDER ORAL at 09:10

## 2022-10-08 RX ADMIN — CLINDAMYCIN IN 5 PERCENT DEXTROSE 600 MG: 12 INJECTION, SOLUTION INTRAVENOUS at 09:10

## 2022-10-08 RX ADMIN — FUROSEMIDE 20 MG: 20 TABLET ORAL at 06:10

## 2022-10-08 RX ADMIN — FUROSEMIDE 20 MG: 20 TABLET ORAL at 05:10

## 2022-10-08 RX ADMIN — CHOLECALCIFEROL TAB 25 MCG (1000 UNIT) 1000 UNITS: 25 TAB at 09:10

## 2022-10-08 NOTE — ASSESSMENT & PLAN NOTE
- diuresis with home lasix and aldactone  - followup repeat TTE  - strict I/Os   - daily weights, preferably standing   - fluid restrict 1.5L while inpatient  - tele  - keep Mg >2, K >4

## 2022-10-08 NOTE — SUBJECTIVE & OBJECTIVE
Interval History: Patient lying in bed, no acute distress.No acute events overnight. Patient reports LLE pain and appearance of the wound is slightly improved. Patient also notes good UOP, regular bowel movements and good po intake. Denies fever, chills, chest pain, SOB, cough, N/V, abdominal pain, changes in bowel or bladder function, signs of bleeding. Patient noted that she would like to still attend her family member's wedding on Tuesday 10/11/22 and primary team informed her that she would likely be discharge on 10/10 on oral antibiotics if wound is improving on IV clindamycin in the next few days.       Review of Systems   Constitutional:  Positive for activity change and fatigue. Negative for chills and fever.   HENT:  Negative for congestion and trouble swallowing.    Eyes:  Negative for visual disturbance.   Respiratory:  Negative for cough and shortness of breath.    Cardiovascular:  Negative for chest pain and leg swelling.   Gastrointestinal:  Negative for abdominal distention, abdominal pain, nausea and vomiting.   Endocrine: Negative for cold intolerance, heat intolerance, polydipsia and polyuria.   Genitourinary:  Negative for difficulty urinating and dysuria.   Musculoskeletal:  Negative for back pain and myalgias.   Skin:  Positive for wound.   Neurological:  Positive for weakness. Negative for dizziness and light-headedness.   Hematological:  Negative for adenopathy. Does not bruise/bleed easily.   Psychiatric/Behavioral:  Negative for confusion and sleep disturbance.    Objective:     Vital Signs (Most Recent):  Temp: 97.3 °F (36.3 °C) (10/08/22 1513)  Pulse: 60 (10/08/22 1513)  Resp: 16 (10/08/22 1513)  BP: (!) 140/63 (10/08/22 1711)  SpO2: 96 % (10/08/22 1513)   Vital Signs (24h Range):  Temp:  [96.2 °F (35.7 °C)-98.4 °F (36.9 °C)] 97.3 °F (36.3 °C)  Pulse:  [60-80] 60  Resp:  [16-18] 16  SpO2:  [93 %-100 %] 96 %  BP: ()/(56-72) 140/63     Weight: 73.5 kg (162 lb)  Body mass index is 26.96  kg/m².    Intake/Output Summary (Last 24 hours) at 10/8/2022 1714  Last data filed at 10/8/2022 0627  Gross per 24 hour   Intake 50 ml   Output 300 ml   Net -250 ml      Physical Exam  Constitutional:       Appearance: She is well-developed.   HENT:      Head: Normocephalic and atraumatic.   Eyes:      General: No scleral icterus.     Pupils: Pupils are equal, round, and reactive to light.   Neck:      Vascular: No JVD.   Cardiovascular:      Rate and Rhythm: Normal rate and regular rhythm.      Heart sounds: No murmur heard.    No friction rub. No gallop.   Pulmonary:      Effort: Pulmonary effort is normal. No respiratory distress.      Breath sounds: Normal breath sounds. No wheezing or rales.   Abdominal:      General: Bowel sounds are normal. There is no distension.      Palpations: Abdomen is soft.      Tenderness: There is no abdominal tenderness. There is no guarding or rebound.   Musculoskeletal:         General: No deformity. Normal range of motion.      Cervical back: Neck supple.      Comments: LLE wound on anterior tibial region with surrounding erythema, swelling and TTP. Without active drainage or fluctuance   Lymphadenopathy:      Cervical: No cervical adenopathy.   Skin:     General: Skin is warm and dry.      Capillary Refill: Capillary refill takes less than 2 seconds.      Findings: No erythema or rash.   Neurological:      Mental Status: She is alert and oriented to person, place, and time.      Cranial Nerves: No cranial nerve deficit.      Sensory: No sensory deficit.   Psychiatric:         Judgment: Judgment normal.       Significant Labs: All pertinent labs within the past 24 hours have been reviewed.  BMP:   Recent Labs   Lab 10/08/22  0439   *   *   K 4.5      CO2 20*   BUN 33*   CREATININE 1.1   CALCIUM 8.7   MG 2.1     CBC:   Recent Labs   Lab 10/07/22  1601 10/07/22  1611 10/08/22  0439   WBC 4.72  --  3.07*   HGB 11.3*  --  9.7*   HCT 33.4* 36 30.2*     --   130*       Significant Imaging: I have reviewed all pertinent imaging results/findings within the past 24 hours.

## 2022-10-08 NOTE — SUBJECTIVE & OBJECTIVE
"Past Medical History:   Diagnosis Date    Amblyopia of left eye 4/10/2013    Anxiety     Arthritis     Facet arthropathy, Lumbosacral    Cataract     Central retinal vein occlusion of left eye     Depression     Diabetic polyneuropathy 2022    Exotropia of both eyes 2013    recession RSR 5.0mm w/ adj; recession LR os 5.0 w/ adj; resect MR os  4.0mm    Hearing loss     History of resection of small bowel     Hypertensive retinopathy of both eyes     Hypoglycemia     Macular degeneration     OA (osteoarthritis) of shoulder     Right    Osteoporosis     Posterior vitreous detachment of both eyes     Rhinitis     TIA (transient ischemic attack)        Past Surgical History:   Procedure Laterality Date    APPENDECTOMY      CARDIAC CATHETERIZATION      CATARACT EXTRACTION W/  INTRAOCULAR LENS IMPLANT Bilateral      SECTION, CLASSIC      CLOSURE OF LEFT ATRIAL APPENDAGE USING DEVICE N/A 2020    Procedure: Left atrial appendage closure device;  Surgeon: Abundio Curtis MD;  Location: [facility] CATH LAB;  Service: Cardiology;  Laterality: N/A;    HYSTERECTOMY      INNER EAR SURGERY      JOINT REPLACEMENT      LEFT KNEE REPLACEMENT IN  -    OOPHORECTOMY      SINUS SURGERY      STRABISMUS SURGERY  13    RSR recession 5 mm, LLR recession 5 mm and LMR resection 4mm    STRABISMUS SURGERY  2014    recess LR OD 6mm    TONSILLECTOMY      watchman surgery N/A 2020       Review of patient's allergies indicates:   Allergen Reactions    Opioids - morphine analogues Other (See Comments)     Bowel issues; bowel obstruction    Tizanidine Other (See Comments)     "Lips were numb,  Almost passed out."    Tramadol Hallucinations    Beta-blockers (beta-adrenergic blocking agts) Other (See Comments)     Can not go on beta blockers for long period of time - due to taking allergy injections    Morphine     Opioids-meperidine and related        No current facility-administered medications on file prior to " encounter.     Current Outpatient Medications on File Prior to Encounter   Medication Sig    aspirin (ECOTRIN) 81 MG EC tablet Take 1 tablet (81 mg total) by mouth once daily.    furosemide (LASIX) 20 MG tablet Take 1 tablet (20 mg total) by mouth 2 (two) times daily before meals.    pravastatin (PRAVACHOL) 40 MG tablet Take 1 tablet (40 mg total) by mouth once daily.    spironolactone (ALDACTONE) 25 MG tablet Take 1 tablet (25 mg total) by mouth 2 (two) times daily before meals.    azelastine (ASTELIN) 137 mcg (0.1 %) nasal spray 1 spray (137 mcg total) by Nasal route 2 (two) times daily.    cholecalciferol, vitamin D3, (VITAMIN D3 ORAL) Take 1 tablet by mouth once daily.    diclofenac sodium (VOLTAREN) 1 % Gel Apply 2 g topically 4 (four) times daily. Use gloves to apply for 10 days    fluticasone propionate (FLONASE) 50 mcg/actuation nasal spray USE 1 SPRAY IN EACH NOSTRIL ONE TIME DAILY (Patient not taking: No sig reported)    hydrOXYzine HCL (ATARAX) 25 MG tablet Take 1 tablet (25 mg total) by mouth 3 (three) times daily as needed for Itching.    mupirocin (BACTROBAN) 2 % ointment Apply topically 3 (three) times daily. To right leg wound    psyllium 0.52 gram capsule Take 9 g by mouth once daily. Pt takes 3 capsules 3 times a day    vit C/E/Zn/coppr/lutein/zeaxan (OCUVITE LUTEIN AND ZEAXANTHIN ORAL) Take 1 capsule by mouth once daily. Lutien 25 mg, Zeaxanthin 5 mg    vitamin E 400 UNIT capsule Take 400 Units by mouth once daily.     Family History       Problem Relation (Age of Onset)    Breast cancer Maternal Aunt    Diabetes Sister, Brother    Heart disease Sister, Sister    Hypertension Mother, Father    Liver disease Sister    No Known Problems Maternal Uncle, Paternal Aunt, Paternal Uncle, Maternal Grandmother, Maternal Grandfather, Paternal Grandmother, Paternal Grandfather, Daughter, Son, Son, Son          Tobacco Use    Smoking status: Former     Types: Cigarettes     Quit date: 10/29/1982     Years  since quittin.9    Smokeless tobacco: Never   Substance and Sexual Activity    Alcohol use: Yes     Alcohol/week: 2.0 standard drinks     Types: 2 Shots of liquor per week     Comment: rare    Drug use: No    Sexual activity: Never     Review of Systems   Constitutional:  Positive for activity change and fatigue. Negative for chills and fever.   HENT:  Negative for congestion and trouble swallowing.    Eyes:  Negative for visual disturbance.   Respiratory:  Negative for cough and shortness of breath.    Cardiovascular:  Positive for leg swelling. Negative for chest pain.   Gastrointestinal:  Negative for abdominal distention, abdominal pain, nausea and vomiting.   Endocrine: Negative for cold intolerance, heat intolerance, polydipsia and polyuria.   Genitourinary:  Negative for difficulty urinating and dysuria.   Musculoskeletal:  Negative for back pain and myalgias.   Skin:  Negative for rash and wound.   Neurological:  Positive for weakness. Negative for dizziness and light-headedness.   Hematological:  Negative for adenopathy. Does not bruise/bleed easily.   Psychiatric/Behavioral:  Negative for confusion and sleep disturbance.    Objective:     Vital Signs (Most Recent):  Temp: 98 °F (36.7 °C) (10/07/22 2140)  Pulse: 80 (10/07/22 2140)  Resp: 18 (10/07/22 2140)  BP: (!) 154/63 (10/07/22 2140)  SpO2: 97 % (10/07/22 2140)   Vital Signs (24h Range):  Temp:  [98 °F (36.7 °C)-98.5 °F (36.9 °C)] 98 °F (36.7 °C)  Pulse:  [68-81] 80  Resp:  [16-18] 18  SpO2:  [95 %-100 %] 97 %  BP: (129-157)/(61-70) 154/63        There is no height or weight on file to calculate BMI.    Physical Exam  Constitutional:       Appearance: She is well-developed.   HENT:      Head: Normocephalic and atraumatic.   Eyes:      General: No scleral icterus.     Pupils: Pupils are equal, round, and reactive to light.   Neck:      Vascular: No JVD.   Cardiovascular:      Rate and Rhythm: Normal rate and regular rhythm.      Heart sounds: No  murmur heard.    No friction rub. No gallop.   Pulmonary:      Effort: Pulmonary effort is normal. No respiratory distress.      Breath sounds: Normal breath sounds. No wheezing or rales.   Abdominal:      General: Bowel sounds are normal. There is no distension.      Palpations: Abdomen is soft.      Tenderness: There is no abdominal tenderness. There is no guarding or rebound.   Musculoskeletal:         General: No deformity. Normal range of motion.      Cervical back: Neck supple.   Lymphadenopathy:      Cervical: No cervical adenopathy.   Skin:     General: Skin is warm and dry.      Capillary Refill: Capillary refill takes less than 2 seconds.      Findings: No erythema or rash.      Comments: Right pre-tibial swelling, redness and erythema that is TTP   Right lower shin wound   Neurological:      Mental Status: She is alert and oriented to person, place, and time.      Cranial Nerves: No cranial nerve deficit.      Sensory: No sensory deficit.   Psychiatric:         Judgment: Judgment normal.         CRANIAL NERVES     CN III, IV, VI   Pupils are equal, round, and reactive to light.     Significant Labs:   Recent Lab Results         10/07/22  1611   10/07/22  1601        Albumin   4.2       Alkaline Phosphatase   129       ALT   12       Anion Gap   11       AST   16       Baso #   0.04       Basophil %   0.8       BILIRUBIN TOTAL   0.9  Comment: For infants and newborns, interpretation of results should be based  on gestational age, weight and in agreement with clinical  observations.    Premature Infant recommended reference ranges:  Up to 24 hours.............<8.0 mg/dL  Up to 48 hours............<12.0 mg/dL  3-5 days..................<15.0 mg/dL  6-29 days.................<15.0 mg/dL         BUN   36       Calcium   9.9       Chloride   107       CO2   22       Creatinine   1.2       Differential Method   Automated       eGFR   43.0       Eos #   0.1       Eosinophil %   2.3       Glucose   78       Gran #  (ANC)   3.3       Gran %   70.5       Hematocrit   33.4       Hemoglobin   11.3       Immature Grans (Abs)   0.01  Comment: Mild elevation in immature granulocytes is non specific and   can be seen in a variety of conditions including stress response,   acute inflammation, trauma and pregnancy. Correlation with other   laboratory and clinical findings is essential.         Immature Granulocytes   0.2       Lactate, Eleanor   1.3  Comment: Falsely low lactic acid results can be found in samples   containing >=13.0 mg/dL total bilirubin and/or >=3.5 mg/dL   direct bilirubin.         Lymph #   0.8       Lymph %   16.7       MCH   29.2       MCHC   33.8       MCV   86       Mono #   0.5       Mono %   9.5       MPV   10.7       nRBC   0       Platelets   183       POC BUN 33         POC Chloride 105         POC Creatinine 1.2         POC Glucose 80         POC Hematocrit 36         POC Ionized Calcium 1.17         POC Potassium 4.1         POC Sodium 139         POC TCO2 (MEASURED) 23         Potassium   4.2       PROTEIN TOTAL   7.4       RBC   3.87       RDW   15.3       Sample ELEANOR         Sodium   140       WBC   4.72               Significant Imaging: I have reviewed all pertinent imaging results/findings within the past 24 hours.    Imaging Results              X-Ray Tibia Fibula 2 View Right (Final result)  Result time 10/07/22 17:37:47      Final result by Austin Us MD (10/07/22 17:37:47)                   Impression:      No acute process.      Electronically signed by: Austin Us MD  Date:    10/07/2022  Time:    17:37               Narrative:    EXAMINATION:  XR TIBIA FIBULA 2 VIEW RIGHT    CLINICAL HISTORY:  Unspecified open wound, right lower leg, subsequent encounter    TECHNIQUE:  AP and lateral views of the right tibia and fibula were performed.    COMPARISON:  01/01/2020.    FINDINGS:  The bone mineralization is within normal limits.  There is no cortical step-off.  There is no evidence of  periostitis.    The joint spaces are maintained.  There are vascular calcifications.  The soft tissues are otherwise unremarkable.    There is no evidence of a fracture or dislocation of the right tibia/fibula.

## 2022-10-08 NOTE — PLAN OF CARE
Problem: Adult Inpatient Plan of Care  Goal: Plan of Care Review  Outcome: Ongoing, Progressing  Goal: Patient-Specific Goal (Individualized)  Outcome: Ongoing, Progressing  Goal: Absence of Hospital-Acquired Illness or Injury  Outcome: Ongoing, Progressing  Goal: Optimal Comfort and Wellbeing  Outcome: Ongoing, Progressing  Goal: Readiness for Transition of Care  Outcome: Ongoing, Progressing     No significant events this shift

## 2022-10-08 NOTE — ASSESSMENT & PLAN NOTE
Patient with Paroxysmal (<7 days) atrial fibrillation which is controlled currently with no medication. Patient is currently in sinus rhythm.[id number] Score: 4. HASBLED Score: Unable to calculate. Anticoagulation not indicated due to age and fall risk.

## 2022-10-08 NOTE — ASSESSMENT & PLAN NOTE
Patient with Paroxysmal (<7 days) atrial fibrillation which is controlled currently with no medication. Patient is currently in sinus rhythm.[id number] Score: 4. HASBLED Score: Unable to calculate. Anticoagulation not indicated due to age and fall risk.

## 2022-10-08 NOTE — ASSESSMENT & PLAN NOTE
- compensated state  - diuresis with home lasix and aldactone  -  TTE showed EF 55-60%, grade III DD, severe TR and mild MR, PASP 52 mm Hg  - strict I/Os   - daily weights, preferably standing   - fluid restrict 1.5L while inpatient  - tele  - keep Mg >2, K >4

## 2022-10-08 NOTE — ASSESSMENT & PLAN NOTE
Patient's FSGs are controlled on current medication regimen.  Last A1c reviewed-   Lab Results   Component Value Date    HGBA1C 6.0 (H) 09/20/2022     Most recent fingerstick glucose reviewed- No results for input(s): POCTGLUCOSE in the last 24 hours.  Current correctional scale  Low  Maintain anti-hyperglycemic dose as follows-   Antihyperglycemics (From admission, onward)    None        Hold Oral hypoglycemics while patient is in the hospital.

## 2022-10-08 NOTE — ED NOTES
Pt assisted to bathroom x1 assist. All pt belongings with pt/transport. Pt transport at beside to bring pt to floor rm now.

## 2022-10-08 NOTE — PROGRESS NOTES
[facility]  Progress Note    Patient Name: Jenniffer Jimenez  MRN: [id number]  Patient Class: OP- Observation   Admission Date: 10/7/2022  Length of Stay: 0 days  Attending Physician: Chito Jade MD  Primary Care Provider: Rubi Young DO        Subjective:     Principal Problem:Cellulitis of right lower extremity        HPI:  Ms. Jimenez is a 89 year old woman with PMH of HTN, HLD, AFib  s/p Watchman, Diastolic HF, Meniere's disease, venous insufficiency of both lower extremities, CKD III who presents with worsening right leg cellulitis. She reports persistent pain, redness to RLE that began about one month ago. She was recently seen in the ED about 2 week prior to admission and was placed on Augmentin, then later switched to keflex and doxycycline. Despite escalating oral antibiotic regimen, patient's RLE pain and swelling continued to worsen. US Doppler LE negative for DVT on 9/25/22.       In the ED, BP (!) 154/63   Pulse 80   Temp 98 °F (36.7 °C)   Resp 18   LMP  (LMP Unknown)   SpO2 97%   Breastfeeding No . She is sating well on room air. CBC significant for Hb 11.3. CMP significant for bicarb 22. Lactate 1.3. XR RLE showed no acute changes. Patient started on IV clindamycin and admitted to Hospital Medicine for further evaluation and management of RLE cellulitis.       Overview/Hospital Course:  No notes on file    Interval History: Patient lying in bed, no acute distress.No acute events overnight. Patient reports LLE pain and appearance of the wound is slightly improved. Patient also notes good UOP, regular bowel movements and good po intake. Denies fever, chills, chest pain, SOB, cough, N/V, abdominal pain, changes in bowel or bladder function, signs of bleeding. Patient noted that she would like to still attend her family member's wedding on Tuesday 10/11/22 and primary team informed her that she would likely be discharge on 10/10 on oral antibiotics if wound is  improving on IV clindamycin in the next few days.       Review of Systems   Constitutional:  Positive for activity change and fatigue. Negative for chills and fever.   HENT:  Negative for congestion and trouble swallowing.    Eyes:  Negative for visual disturbance.   Respiratory:  Negative for cough and shortness of breath.    Cardiovascular:  Negative for chest pain and leg swelling.   Gastrointestinal:  Negative for abdominal distention, abdominal pain, nausea and vomiting.   Endocrine: Negative for cold intolerance, heat intolerance, polydipsia and polyuria.   Genitourinary:  Negative for difficulty urinating and dysuria.   Musculoskeletal:  Negative for back pain and myalgias.   Skin:  Positive for wound.   Neurological:  Positive for weakness. Negative for dizziness and light-headedness.   Hematological:  Negative for adenopathy. Does not bruise/bleed easily.   Psychiatric/Behavioral:  Negative for confusion and sleep disturbance.    Objective:     Vital Signs (Most Recent):  Temp: 97.3 °F (36.3 °C) (10/08/22 1513)  Pulse: 60 (10/08/22 1513)  Resp: 16 (10/08/22 1513)  BP: (!) 140/63 (10/08/22 1711)  SpO2: 96 % (10/08/22 1513)   Vital Signs (24h Range):  Temp:  [96.2 °F (35.7 °C)-98.4 °F (36.9 °C)] 97.3 °F (36.3 °C)  Pulse:  [60-80] 60  Resp:  [16-18] 16  SpO2:  [93 %-100 %] 96 %  BP: ()/(56-72) 140/63     Weight: 73.5 kg (162 lb)  Body mass index is 26.96 kg/m².    Intake/Output Summary (Last 24 hours) at 10/8/2022 6845  Last data filed at 10/8/2022 0627  Gross per 24 hour   Intake 50 ml   Output 300 ml   Net -250 ml      Physical Exam  Constitutional:       Appearance: She is well-developed.   HENT:      Head: Normocephalic and atraumatic.   Eyes:      General: No scleral icterus.     Pupils: Pupils are equal, round, and reactive to light.   Neck:      Vascular: No JVD.   Cardiovascular:      Rate and Rhythm: Normal rate and regular rhythm.      Heart sounds: No murmur heard.    No friction rub. No  gallop.   Pulmonary:      Effort: Pulmonary effort is normal. No respiratory distress.      Breath sounds: Normal breath sounds. No wheezing or rales.   Abdominal:      General: Bowel sounds are normal. There is no distension.      Palpations: Abdomen is soft.      Tenderness: There is no abdominal tenderness. There is no guarding or rebound.   Musculoskeletal:         General: No deformity. Normal range of motion.      Cervical back: Neck supple.      Comments: LLE wound on anterior tibial region with surrounding erythema, swelling and TTP. Without active drainage or fluctuance   Lymphadenopathy:      Cervical: No cervical adenopathy.   Skin:     General: Skin is warm and dry.      Capillary Refill: Capillary refill takes less than 2 seconds.      Findings: No erythema or rash.   Neurological:      Mental Status: She is alert and oriented to person, place, and time.      Cranial Nerves: No cranial nerve deficit.      Sensory: No sensory deficit.   Psychiatric:         Judgment: Judgment normal.       Significant Labs: All pertinent labs within the past 24 hours have been reviewed.  BMP:   Recent Labs   Lab 10/08/22  0439   *   *   K 4.5      CO2 20*   BUN 33*   CREATININE 1.1   CALCIUM 8.7   MG 2.1     CBC:   Recent Labs   Lab 10/07/22  1601 10/07/22  1611 10/08/22  0439   WBC 4.72  --  3.07*   HGB 11.3*  --  9.7*   HCT 33.4* 36 30.2*     --  130*       Significant Imaging: I have reviewed all pertinent imaging results/findings within the past 24 hours.      Assessment/Plan:      * Cellulitis of right lower extremity  - nonseptic   - XR RLE showed no acute changes  - lactate wnl  - continue IV clindamycin  (D1- 10/7). Plan to transition to oral clindamycin on 10/9  - wound care consulted, followup recs      Diabetic polyneuropathy  Patient's FSGs are controlled on current medication regimen.  Last A1c reviewed-   Lab Results   Component Value Date    HGBA1C 6.0 (H) 09/20/2022     Most  recent fingerstick glucose reviewed- No results for input(s): POCTGLUCOSE in the last 24 hours.  Current correctional scale  Low  Maintain anti-hyperglycemic dose as follows-   Antihyperglycemics (From admission, onward)    None        Hold Oral hypoglycemics while patient is in the hospital.  - patient previously prescribed gabapentin but stopped taking it due to concerns about potential side effects    Venous insufficiency of both lower extremities  - continue diuresis and elevate legs       Chronic diastolic heart failure  - compensated state  - diuresis with home lasix and aldactone  -  TTE showed EF 55-60%, grade III DD, severe TR and mild MR, PASP 52 mm Hg  - strict I/Os   - daily weights, preferably standing   - fluid restrict 1.5L while inpatient  - tele  - keep Mg >2, K >4        Stage 3b chronic kidney disease  - Hb at baseline 1.1-1.2  - renally dose medications   - BMP daily       Chronic atrial fibrillation  Patient with Paroxysmal (<7 days) atrial fibrillation which is controlled currently with no medication. Patient is currently in sinus rhythm.[id number] Score: 4. HASBLED Score: Unable to calculate. Anticoagulation not indicated due to age and fall risk.        Meniere's disease  - no active issues  - functional status at baseline      Primary hypertension  - continue home lasix and aldactone  - stable   - monitor       Pure hypercholesterolemia  - continue home statin        VTE Risk Mitigation (From admission, onward)         Ordered     enoxaparin injection 40 mg  Daily         10/07/22 2133     IP VTE HIGH RISK PATIENT  Once         10/07/22 2133     Place sequential compression device  Until discontinued         10/07/22 2133                Discharge Planning   GILES: 10/10/2022     Code Status: Full Code   Is the patient medically ready for discharge?: No    Reason for patient still in hospital (select all that apply): Patient unstable, Patient trending condition, Laboratory test and Treatment              Time spent in care of patient: > 35 minutes           Chito Jade MD  Department of Hospital Medicine   [facility]

## 2022-10-08 NOTE — ASSESSMENT & PLAN NOTE
- nonseptic   - XR RLE showed no acute changes  - lactate wnl  - continue IV clindamycin  (D1- 10/7). Plan to transition to oral clindamycin on 10/9  - wound care consulted, followup recs

## 2022-10-08 NOTE — ED NOTES
"Jenniffer Jimenez, a 89 y.o. female presents to the ED w/ complaint of RLE edema/erythema    Triage note:  Chief Complaint   Patient presents with    Wound Infection     To right leg, taking PO ABX and finished 4 days      Review of patient's allergies indicates:   Allergen Reactions    Opioids - morphine analogues Other (See Comments)     Bowel issues; bowel obstruction    Tizanidine Other (See Comments)     "Lips were numb,  Almost passed out."    Tramadol Hallucinations    Beta-blockers (beta-adrenergic blocking agts) Other (See Comments)     Can not go on beta blockers for long period of time - due to taking allergy injections    Morphine     Opioids-meperidine and related      Past Medical History:   Diagnosis Date    Amblyopia of left eye 4/10/2013    Anxiety     Arthritis     Facet arthropathy, Lumbosacral    Cataract     Central retinal vein occlusion of left eye     Depression     Diabetic polyneuropathy 1/6/2022    Exotropia of both eyes 2/6/2013    recession RSR 5.0mm w/ adj; recession LR os 5.0 w/ adj; resect MR os  4.0mm    Hearing loss     History of resection of small bowel     Hypertensive retinopathy of both eyes     Hypoglycemia     Macular degeneration     OA (osteoarthritis) of shoulder     Right    Osteoporosis     Posterior vitreous detachment of both eyes     Rhinitis     TIA (transient ischemic attack)       "

## 2022-10-08 NOTE — ASSESSMENT & PLAN NOTE
- nonseptic   - XR RLE showed no acute changes  - lactate wnl  - continue IV clindamycin  (D1- 10/7)  - wound care consulted, followup recs  - if patient spikes fever or clinically declines, collect blood cultures, broaden antibiotics, order lactate and start gentle IVF

## 2022-10-08 NOTE — H&P
[facility] Medicine  History & Physical    Patient Name: Jenniffer Jimenez  MRN: [id number]  Patient Class: OP- Observation  Admission Date: 10/7/2022  Attending Physician: Chito Jade MD   Primary Care Provider: Rubi Young DO         Patient information was obtained from patient and ER records.     Subjective:     Principal Problem:Cellulitis of right lower extremity    Chief Complaint:   Chief Complaint   Patient presents with    Wound Infection     To right leg, taking PO ABX and finished 4 days         HPI: Ms. Jimenez is a 89 year old woman with PMH of HTN, HLD, AFib  s/p Watchman, Diastolic HF, Meniere's disease, venous insufficiency of both lower extremities, CKD III who presents with worsening right leg cellulitis. She reports persistent pain, redness to RLE that began about one month ago. She was recently seen in the ED about 2 week prior to admission and was placed on Augmentin, then later switched to keflex and doxycycline. Despite escalating oral antibiotic regimen, patient's RLE pain and swelling continued to worsen. US Doppler LE negative for DVT on 9/25/22.       In the ED, BP (!) 154/63   Pulse 80   Temp 98 °F (36.7 °C)   Resp 18   LMP  (LMP Unknown)   SpO2 97%   Breastfeeding No . She is sating well on room air. CBC significant for Hb 11.3. CMP significant for bicarb 22. Lactate 1.3. XR RLE showed no acute changes. Patient started on IV clindamycin and admitted to Hospital Medicine for further evaluation and management of RLE cellulitis.       Past Medical History:   Diagnosis Date    Amblyopia of left eye 4/10/2013    Anxiety     Arthritis     Facet arthropathy, Lumbosacral    Cataract     Central retinal vein occlusion of left eye     Depression     Diabetic polyneuropathy 1/6/2022    Exotropia of both eyes 2/6/2013    recession RSR 5.0mm w/ adj; recession LR os 5.0 w/ adj; resect MR os  4.0mm    Hearing loss     History of resection of small bowel   "   Hypertensive retinopathy of both eyes     Hypoglycemia     Macular degeneration     OA (osteoarthritis) of shoulder     Right    Osteoporosis     Posterior vitreous detachment of both eyes     Rhinitis     TIA (transient ischemic attack)        Past Surgical History:   Procedure Laterality Date    APPENDECTOMY      CARDIAC CATHETERIZATION      CATARACT EXTRACTION W/  INTRAOCULAR LENS IMPLANT Bilateral      SECTION, CLASSIC      CLOSURE OF LEFT ATRIAL APPENDAGE USING DEVICE N/A 2020    Procedure: Left atrial appendage closure device;  Surgeon: Abundio Curtis MD;  Location: [facility] CATH LAB;  Service: Cardiology;  Laterality: N/A;    HYSTERECTOMY      INNER EAR SURGERY      JOINT REPLACEMENT      LEFT KNEE REPLACEMENT IN 2013 -    OOPHORECTOMY      SINUS SURGERY      STRABISMUS SURGERY  13    RSR recession 5 mm, LLR recession 5 mm and LMR resection 4mm    STRABISMUS SURGERY  2014    recess LR OD 6mm    TONSILLECTOMY      watchman surgery N/A 2020       Review of patient's allergies indicates:   Allergen Reactions    Opioids - morphine analogues Other (See Comments)     Bowel issues; bowel obstruction    Tizanidine Other (See Comments)     "Lips were numb,  Almost passed out."    Tramadol Hallucinations    Beta-blockers (beta-adrenergic blocking agts) Other (See Comments)     Can not go on beta blockers for long period of time - due to taking allergy injections    Morphine     Opioids-meperidine and related        No current facility-administered medications on file prior to encounter.     Current Outpatient Medications on File Prior to Encounter   Medication Sig    aspirin (ECOTRIN) 81 MG EC tablet Take 1 tablet (81 mg total) by mouth once daily.    furosemide (LASIX) 20 MG tablet Take 1 tablet (20 mg total) by mouth 2 (two) times daily before meals.    pravastatin (PRAVACHOL) 40 MG tablet Take 1 tablet (40 mg total) by mouth once daily.    spironolactone " (ALDACTONE) 25 MG tablet Take 1 tablet (25 mg total) by mouth 2 (two) times daily before meals.    azelastine (ASTELIN) 137 mcg (0.1 %) nasal spray 1 spray (137 mcg total) by Nasal route 2 (two) times daily.    cholecalciferol, vitamin D3, (VITAMIN D3 ORAL) Take 1 tablet by mouth once daily.    diclofenac sodium (VOLTAREN) 1 % Gel Apply 2 g topically 4 (four) times daily. Use gloves to apply for 10 days    fluticasone propionate (FLONASE) 50 mcg/actuation nasal spray USE 1 SPRAY IN EACH NOSTRIL ONE TIME DAILY (Patient not taking: No sig reported)    hydrOXYzine HCL (ATARAX) 25 MG tablet Take 1 tablet (25 mg total) by mouth 3 (three) times daily as needed for Itching.    mupirocin (BACTROBAN) 2 % ointment Apply topically 3 (three) times daily. To right leg wound    psyllium 0.52 gram capsule Take 9 g by mouth once daily. Pt takes 3 capsules 3 times a day    vit C/E/Zn/coppr/lutein/zeaxan (OCUVITE LUTEIN AND ZEAXANTHIN ORAL) Take 1 capsule by mouth once daily. Lutien 25 mg, Zeaxanthin 5 mg    vitamin E 400 UNIT capsule Take 400 Units by mouth once daily.     Family History       Problem Relation (Age of Onset)    Breast cancer Maternal Aunt    Diabetes Sister, Brother    Heart disease Sister, Sister    Hypertension Mother, Father    Liver disease Sister    No Known Problems Maternal Uncle, Paternal Aunt, Paternal Uncle, Maternal Grandmother, Maternal Grandfather, Paternal Grandmother, Paternal Grandfather, Daughter, Son, Son, Son          Tobacco Use    Smoking status: Former     Types: Cigarettes     Quit date: 10/29/1982     Years since quittin.9    Smokeless tobacco: Never   Substance and Sexual Activity    Alcohol use: Yes     Alcohol/week: 2.0 standard drinks     Types: 2 Shots of liquor per week     Comment: rare    Drug use: No    Sexual activity: Never     Review of Systems   Constitutional:  Positive for activity change and fatigue. Negative for chills and fever.   HENT:  Negative for  congestion and trouble swallowing.    Eyes:  Negative for visual disturbance.   Respiratory:  Negative for cough and shortness of breath.    Cardiovascular:  Positive for leg swelling. Negative for chest pain.   Gastrointestinal:  Negative for abdominal distention, abdominal pain, nausea and vomiting.   Endocrine: Negative for cold intolerance, heat intolerance, polydipsia and polyuria.   Genitourinary:  Negative for difficulty urinating and dysuria.   Musculoskeletal:  Negative for back pain and myalgias.   Skin:  Negative for rash and wound.   Neurological:  Positive for weakness. Negative for dizziness and light-headedness.   Hematological:  Negative for adenopathy. Does not bruise/bleed easily.   Psychiatric/Behavioral:  Negative for confusion and sleep disturbance.    Objective:     Vital Signs (Most Recent):  Temp: 98 °F (36.7 °C) (10/07/22 2140)  Pulse: 80 (10/07/22 2140)  Resp: 18 (10/07/22 2140)  BP: (!) 154/63 (10/07/22 2140)  SpO2: 97 % (10/07/22 2140)   Vital Signs (24h Range):  Temp:  [98 °F (36.7 °C)-98.5 °F (36.9 °C)] 98 °F (36.7 °C)  Pulse:  [68-81] 80  Resp:  [16-18] 18  SpO2:  [95 %-100 %] 97 %  BP: (129-157)/(61-70) 154/63        There is no height or weight on file to calculate BMI.    Physical Exam  Constitutional:       Appearance: She is well-developed.   HENT:      Head: Normocephalic and atraumatic.   Eyes:      General: No scleral icterus.     Pupils: Pupils are equal, round, and reactive to light.   Neck:      Vascular: No JVD.   Cardiovascular:      Rate and Rhythm: Normal rate and regular rhythm.      Heart sounds: No murmur heard.    No friction rub. No gallop.   Pulmonary:      Effort: Pulmonary effort is normal. No respiratory distress.      Breath sounds: Normal breath sounds. No wheezing or rales.   Abdominal:      General: Bowel sounds are normal. There is no distension.      Palpations: Abdomen is soft.      Tenderness: There is no abdominal tenderness. There is no guarding or  rebound.   Musculoskeletal:         General: No deformity. Normal range of motion.      Cervical back: Neck supple.   Lymphadenopathy:      Cervical: No cervical adenopathy.   Skin:     General: Skin is warm and dry.      Capillary Refill: Capillary refill takes less than 2 seconds.      Findings: No erythema or rash.      Comments: Right pre-tibial swelling, redness and erythema that is TTP   Right lower shin wound   Neurological:      Mental Status: She is alert and oriented to person, place, and time.      Cranial Nerves: No cranial nerve deficit.      Sensory: No sensory deficit.   Psychiatric:         Judgment: Judgment normal.         CRANIAL NERVES     CN III, IV, VI   Pupils are equal, round, and reactive to light.     Significant Labs:   Recent Lab Results         10/07/22  1611   10/07/22  1601        Albumin   4.2       Alkaline Phosphatase   129       ALT   12       Anion Gap   11       AST   16       Baso #   0.04       Basophil %   0.8       BILIRUBIN TOTAL   0.9  Comment: For infants and newborns, interpretation of results should be based  on gestational age, weight and in agreement with clinical  observations.    Premature Infant recommended reference ranges:  Up to 24 hours.............<8.0 mg/dL  Up to 48 hours............<12.0 mg/dL  3-5 days..................<15.0 mg/dL  6-29 days.................<15.0 mg/dL         BUN   36       Calcium   9.9       Chloride   107       CO2   22       Creatinine   1.2       Differential Method   Automated       eGFR   43.0       Eos #   0.1       Eosinophil %   2.3       Glucose   78       Gran # (ANC)   3.3       Gran %   70.5       Hematocrit   33.4       Hemoglobin   11.3       Immature Grans (Abs)   0.01  Comment: Mild elevation in immature granulocytes is non specific and   can be seen in a variety of conditions including stress response,   acute inflammation, trauma and pregnancy. Correlation with other   laboratory and clinical findings is essential.          Immature Granulocytes   0.2       Lactate, Eleanor   1.3  Comment: Falsely low lactic acid results can be found in samples   containing >=13.0 mg/dL total bilirubin and/or >=3.5 mg/dL   direct bilirubin.         Lymph #   0.8       Lymph %   16.7       MCH   29.2       MCHC   33.8       MCV   86       Mono #   0.5       Mono %   9.5       MPV   10.7       nRBC   0       Platelets   183       POC BUN 33         POC Chloride 105         POC Creatinine 1.2         POC Glucose 80         POC Hematocrit 36         POC Ionized Calcium 1.17         POC Potassium 4.1         POC Sodium 139         POC TCO2 (MEASURED) 23         Potassium   4.2       PROTEIN TOTAL   7.4       RBC   3.87       RDW   15.3       Sample ELEANOR         Sodium   140       WBC   4.72               Significant Imaging: I have reviewed all pertinent imaging results/findings within the past 24 hours.    Imaging Results              X-Ray Tibia Fibula 2 View Right (Final result)  Result time 10/07/22 17:37:47      Final result by Austin Us MD (10/07/22 17:37:47)                   Impression:      No acute process.      Electronically signed by: Austin Us MD  Date:    10/07/2022  Time:    17:37               Narrative:    EXAMINATION:  XR TIBIA FIBULA 2 VIEW RIGHT    CLINICAL HISTORY:  Unspecified open wound, right lower leg, subsequent encounter    TECHNIQUE:  AP and lateral views of the right tibia and fibula were performed.    COMPARISON:  01/01/2020.    FINDINGS:  The bone mineralization is within normal limits.  There is no cortical step-off.  There is no evidence of periostitis.    The joint spaces are maintained.  There are vascular calcifications.  The soft tissues are otherwise unremarkable.    There is no evidence of a fracture or dislocation of the right tibia/fibula.                                        Assessment/Plan:     * Cellulitis of right lower extremity  - nonseptic   - XR RLE showed no acute changes  - lactate wnl  - continue IV  clindamycin  (D1- 10/7)  - wound care consulted, followup recs  - if patient spikes fever or clinically declines, collect blood cultures, broaden antibiotics, order lactate and start gentle IVF       Diabetic polyneuropathy  Patient's FSGs are controlled on current medication regimen.  Last A1c reviewed-   Lab Results   Component Value Date    HGBA1C 6.0 (H) 09/20/2022     Most recent fingerstick glucose reviewed- No results for input(s): POCTGLUCOSE in the last 24 hours.  Current correctional scale  Low  Maintain anti-hyperglycemic dose as follows-   Antihyperglycemics (From admission, onward)    None        Hold Oral hypoglycemics while patient is in the hospital.    Venous insufficiency of both lower extremities  - continue diuresis and elevate legs       Chronic diastolic heart failure  - diuresis with home lasix and aldactone  - followup repeat TTE  - strict I/Os   - daily weights, preferably standing   - fluid restrict 1.5L while inpatient  - tele  - keep Mg >2, K >4        Stage 3b chronic kidney disease  - Hb at baseline 1.1-1.2  - renally dose medications   - BMP daily       Chronic atrial fibrillation  Patient with Paroxysmal (<7 days) atrial fibrillation which is controlled currently with no medication. Patient is currently in sinus rhythm.[id number] Score: 4. HASBLED Score: Unable to calculate. Anticoagulation not indicated due to age and fall risk.        Meniere's disease  - no active issues      Primary hypertension  - continue home lasix and aldactone  - stable   - monitor       Pure hypercholesterolemia  - continue home statin        VTE Risk Mitigation (From admission, onward)         Ordered     enoxaparin injection 40 mg  Daily         10/07/22 2133     IP VTE HIGH RISK PATIENT  Once         10/07/22 2133     Place sequential compression device  Until discontinued         10/07/22 2133              Time spent in care of patient: > 35 minutes       Chito Jade MD  Department of Hospital  Medicine   [facility] S[facility]

## 2022-10-08 NOTE — ASSESSMENT & PLAN NOTE
Patient's FSGs are controlled on current medication regimen.  Last A1c reviewed-   Lab Results   Component Value Date    HGBA1C 6.0 (H) 09/20/2022     Most recent fingerstick glucose reviewed- No results for input(s): POCTGLUCOSE in the last 24 hours.  Current correctional scale  Low  Maintain anti-hyperglycemic dose as follows-   Antihyperglycemics (From admission, onward)    None        Hold Oral hypoglycemics while patient is in the hospital.  - patient previously prescribed gabapentin but stopped taking it due to concerns about potential side effects

## 2022-10-08 NOTE — HPI
Ms. Jimenez is a 89 year old woman with PMH of HTN, HLD, AFib  s/p Watchman, Diastolic HF, Meniere's disease, venous insufficiency of both lower extremities, CKD III who presents with worsening right leg cellulitis. She reports persistent pain, redness to RLE that began about one month ago. She was recently seen in the ED about 2 week prior to admission and was placed on Augmentin, then later switched to keflex and doxycycline. Despite escalating oral antibiotic regimen, patient's RLE pain and swelling continued to worsen. US Doppler LE negative for DVT on 9/25/22.       In the ED, BP (!) 154/63   Pulse 80   Temp 98 °F (36.7 °C)   Resp 18   LMP  (LMP Unknown)   SpO2 97%   Breastfeeding No . She is sating well on room air. CBC significant for Hb 11.3. CMP significant for bicarb 22. Lactate 1.3. XR RLE showed no acute changes. Patient started on IV clindamycin and admitted to Hospital Medicine for further evaluation and management of RLE cellulitis.

## 2022-10-09 LAB
ALBUMIN SERPL BCP-MCNC: 3.4 G/DL (ref 3.5–5.2)
ALP SERPL-CCNC: 103 U/L (ref 55–135)
ALT SERPL W/O P-5'-P-CCNC: 9 U/L (ref 10–44)
ANION GAP SERPL CALC-SCNC: 10 MMOL/L (ref 8–16)
AST SERPL-CCNC: 13 U/L (ref 10–40)
BASOPHILS # BLD AUTO: 0.03 K/UL (ref 0–0.2)
BASOPHILS NFR BLD: 0.9 % (ref 0–1.9)
BILIRUB SERPL-MCNC: 0.7 MG/DL (ref 0.1–1)
BUN SERPL-MCNC: 35 MG/DL (ref 8–23)
CALCIUM SERPL-MCNC: 8.9 MG/DL (ref 8.7–10.5)
CHLORIDE SERPL-SCNC: 108 MMOL/L (ref 95–110)
CO2 SERPL-SCNC: 21 MMOL/L (ref 23–29)
CREAT SERPL-MCNC: 1.3 MG/DL (ref 0.5–1.4)
CRP SERPL-MCNC: 2.1 MG/L (ref 0–8.2)
DIFFERENTIAL METHOD: ABNORMAL
EOSINOPHIL # BLD AUTO: 0.1 K/UL (ref 0–0.5)
EOSINOPHIL NFR BLD: 2.9 % (ref 0–8)
ERYTHROCYTE [DISTWIDTH] IN BLOOD BY AUTOMATED COUNT: 15.6 % (ref 11.5–14.5)
ERYTHROCYTE [SEDIMENTATION RATE] IN BLOOD BY PHOTOMETRIC METHOD: 18 MM/HR (ref 0–36)
EST. GFR  (NO RACE VARIABLE): 39.1 ML/MIN/1.73 M^2
GLUCOSE SERPL-MCNC: 99 MG/DL (ref 70–110)
HCT VFR BLD AUTO: 31.1 % (ref 37–48.5)
HGB BLD-MCNC: 10.1 G/DL (ref 12–16)
IMM GRANULOCYTES # BLD AUTO: 0.03 K/UL (ref 0–0.04)
IMM GRANULOCYTES NFR BLD AUTO: 0.9 % (ref 0–0.5)
LYMPHOCYTES # BLD AUTO: 0.6 K/UL (ref 1–4.8)
LYMPHOCYTES NFR BLD: 16.8 % (ref 18–48)
MAGNESIUM SERPL-MCNC: 2 MG/DL (ref 1.6–2.6)
MCH RBC QN AUTO: 29.2 PG (ref 27–31)
MCHC RBC AUTO-ENTMCNC: 32.5 G/DL (ref 32–36)
MCV RBC AUTO: 90 FL (ref 82–98)
MONOCYTES # BLD AUTO: 0.4 K/UL (ref 0.3–1)
MONOCYTES NFR BLD: 11.6 % (ref 4–15)
NEUTROPHILS # BLD AUTO: 2.3 K/UL (ref 1.8–7.7)
NEUTROPHILS NFR BLD: 66.9 % (ref 38–73)
NRBC BLD-RTO: 0 /100 WBC
PHOSPHATE SERPL-MCNC: 4.4 MG/DL (ref 2.7–4.5)
PLATELET # BLD AUTO: 145 K/UL (ref 150–450)
PMV BLD AUTO: 10.8 FL (ref 9.2–12.9)
POTASSIUM SERPL-SCNC: 4.2 MMOL/L (ref 3.5–5.1)
PROT SERPL-MCNC: 5.9 G/DL (ref 6–8.4)
RBC # BLD AUTO: 3.46 M/UL (ref 4–5.4)
SODIUM SERPL-SCNC: 139 MMOL/L (ref 136–145)
WBC # BLD AUTO: 3.45 K/UL (ref 3.9–12.7)

## 2022-10-09 PROCEDURE — 85025 COMPLETE CBC W/AUTO DIFF WBC: CPT | Performed by: INTERNAL MEDICINE

## 2022-10-09 PROCEDURE — G0378 HOSPITAL OBSERVATION PER HR: HCPCS

## 2022-10-09 PROCEDURE — 63600175 PHARM REV CODE 636 W HCPCS: Performed by: INTERNAL MEDICINE

## 2022-10-09 PROCEDURE — 80053 COMPREHEN METABOLIC PANEL: CPT | Performed by: INTERNAL MEDICINE

## 2022-10-09 PROCEDURE — 25000003 PHARM REV CODE 250: Performed by: PHYSICIAN ASSISTANT

## 2022-10-09 PROCEDURE — 99225 PR SUBSEQUENT OBSERVATION CARE,LEVEL II: ICD-10-PCS | Mod: ,,, | Performed by: INTERNAL MEDICINE

## 2022-10-09 PROCEDURE — 25000242 PHARM REV CODE 250 ALT 637 W/ HCPCS: Performed by: INTERNAL MEDICINE

## 2022-10-09 PROCEDURE — 85652 RBC SED RATE AUTOMATED: CPT | Performed by: INTERNAL MEDICINE

## 2022-10-09 PROCEDURE — 96372 THER/PROPH/DIAG INJ SC/IM: CPT | Performed by: INTERNAL MEDICINE

## 2022-10-09 PROCEDURE — 83735 ASSAY OF MAGNESIUM: CPT | Performed by: INTERNAL MEDICINE

## 2022-10-09 PROCEDURE — 84100 ASSAY OF PHOSPHORUS: CPT | Performed by: INTERNAL MEDICINE

## 2022-10-09 PROCEDURE — 99225 PR SUBSEQUENT OBSERVATION CARE,LEVEL II: CPT | Mod: ,,, | Performed by: INTERNAL MEDICINE

## 2022-10-09 PROCEDURE — 36415 COLL VENOUS BLD VENIPUNCTURE: CPT | Performed by: INTERNAL MEDICINE

## 2022-10-09 PROCEDURE — 86140 C-REACTIVE PROTEIN: CPT | Performed by: INTERNAL MEDICINE

## 2022-10-09 PROCEDURE — 96366 THER/PROPH/DIAG IV INF ADDON: CPT

## 2022-10-09 PROCEDURE — 25000003 PHARM REV CODE 250: Performed by: INTERNAL MEDICINE

## 2022-10-09 RX ORDER — CEFPODOXIME PROXETIL 200 MG/1
200 TABLET, FILM COATED ORAL DAILY
Status: DISCONTINUED | OUTPATIENT
Start: 2022-10-10 | End: 2022-10-10 | Stop reason: HOSPADM

## 2022-10-09 RX ORDER — ENOXAPARIN SODIUM 100 MG/ML
30 INJECTION SUBCUTANEOUS EVERY 24 HOURS
Status: DISCONTINUED | OUTPATIENT
Start: 2022-10-09 | End: 2022-10-10 | Stop reason: HOSPADM

## 2022-10-09 RX ORDER — FUROSEMIDE 20 MG/1
20 TABLET ORAL
Status: DISCONTINUED | OUTPATIENT
Start: 2022-10-10 | End: 2022-10-10 | Stop reason: HOSPADM

## 2022-10-09 RX ORDER — DOXYCYCLINE HYCLATE 100 MG
100 TABLET ORAL 2 TIMES DAILY
Status: DISCONTINUED | OUTPATIENT
Start: 2022-10-09 | End: 2022-10-10 | Stop reason: HOSPADM

## 2022-10-09 RX ORDER — PRAVASTATIN SODIUM 10 MG/1
10 TABLET ORAL DAILY
Status: DISCONTINUED | OUTPATIENT
Start: 2022-10-10 | End: 2022-10-10 | Stop reason: HOSPADM

## 2022-10-09 RX ADMIN — AZELASTINE HYDROCHLORIDE 137 MCG: 137 SPRAY, METERED NASAL at 09:10

## 2022-10-09 RX ADMIN — PSYLLIUM HUSK 1 PACKET: 3.4 POWDER ORAL at 09:10

## 2022-10-09 RX ADMIN — Medication 400 UNITS: at 09:10

## 2022-10-09 RX ADMIN — ENOXAPARIN SODIUM 30 MG: 100 INJECTION SUBCUTANEOUS at 05:10

## 2022-10-09 RX ADMIN — PRAVASTATIN SODIUM 40 MG: 40 TABLET ORAL at 09:10

## 2022-10-09 RX ADMIN — CHOLECALCIFEROL TAB 25 MCG (1000 UNIT) 1000 UNITS: 25 TAB at 09:10

## 2022-10-09 RX ADMIN — DOXYCYCLINE HYCLATE 100 MG: 100 TABLET, COATED ORAL at 08:10

## 2022-10-09 RX ADMIN — CLINDAMYCIN IN 5 PERCENT DEXTROSE 600 MG: 12 INJECTION, SOLUTION INTRAVENOUS at 03:10

## 2022-10-09 RX ADMIN — ACETAMINOPHEN 650 MG: 325 TABLET ORAL at 08:10

## 2022-10-09 RX ADMIN — CLINDAMYCIN IN 5 PERCENT DEXTROSE 600 MG: 12 INJECTION, SOLUTION INTRAVENOUS at 10:10

## 2022-10-09 RX ADMIN — ASPIRIN 81 MG: 81 TABLET, COATED ORAL at 09:10

## 2022-10-09 RX ADMIN — GUAIFENESIN 200 MG: 200 SOLUTION ORAL at 08:10

## 2022-10-09 RX ADMIN — AZELASTINE HYDROCHLORIDE 137 MCG: 137 SPRAY, METERED NASAL at 08:10

## 2022-10-09 NOTE — ASSESSMENT & PLAN NOTE
- compensated state  - holding home lasix and aldactone due to hypotension  -  TTE showed EF 55-60%, grade III DD, severe TR and mild MR, PASP 52 mm Hg  - strict I/Os   - daily weights, preferably standing   - fluid restrict 1.5L while inpatient  - tele  - keep Mg >2, K >4

## 2022-10-09 NOTE — PROGRESS NOTES
Pharmacist Renal Dose Adjustment Note    Jenniffer Jimenez is a 89 y.o. female being treated with the medication enoxaparin    Patient Data:    Vital Signs (Most Recent):  Temp: 98.2 °F (36.8 °C) (10/09/22 0718)  Pulse: 61 (10/09/22 0718)  Resp: 18 (10/09/22 0718)  BP: (!) 96/50 (10/09/22 0718)  SpO2: 97 % (10/09/22 0718)   Vital Signs (72h Range):  Temp:  [96.2 °F (35.7 °C)-98.5 °F (36.9 °C)]   Pulse:  [60-81]   Resp:  [16-20]   BP: ()/(50-72)   SpO2:  [93 %-100 %]      Recent Labs   Lab 10/07/22  1601 10/08/22  0439 10/09/22  0738   CREATININE 1.2 1.1 1.3     Serum creatinine: 1.3 mg/dL 10/09/22 0738  Estimated creatinine clearance: 28.9 mL/min    Medication:enoxaparin dose: 40 mg frequency q 24 h will be changed to medication:enoxaparin dose:30 mg frequency:q 24 h    Pharmacist's Name: Jono Sanchez  Pharmacist's Extension: [phone]

## 2022-10-09 NOTE — SUBJECTIVE & OBJECTIVE
Interval History: Patient lying in bed, no acute distress.No acute events overnight. Patient reports dizziness and fatigue overnight. Discontinued home lasix and spironolactone since patient hypotensive and likely causing her symptoms. Patient denies fever, chills, chest pain, N/V, SOB, dysuria or abdominal pain. Tolerating IV clindamycin and ID consulted to assist with antibiotic guidance since patient's cellulitis was refractory to several other oral antibiotics prior to admission. Wound care consulted and will evaluate patient tomorrow.       Review of Systems   Constitutional:  Positive for activity change and fatigue. Negative for chills and fever.   HENT:  Negative for congestion and trouble swallowing.    Eyes:  Negative for visual disturbance.   Respiratory:  Negative for cough and shortness of breath.    Cardiovascular:  Negative for chest pain and leg swelling.   Gastrointestinal:  Negative for abdominal distention, abdominal pain, nausea and vomiting.   Endocrine: Negative for cold intolerance, heat intolerance, polydipsia and polyuria.   Genitourinary:  Negative for difficulty urinating and dysuria.   Musculoskeletal:  Negative for back pain and myalgias.   Skin:  Positive for wound.   Neurological:  Positive for weakness. Negative for dizziness and light-headedness.   Hematological:  Negative for adenopathy. Does not bruise/bleed easily.   Psychiatric/Behavioral:  Negative for confusion and sleep disturbance.    Objective:     Vital Signs (Most Recent):  Temp: 97 °F (36.1 °C) (10/09/22 1547)  Pulse: 71 (10/09/22 1547)  Resp: 16 (10/09/22 1547)  BP: 134/61 (10/09/22 1547)  SpO2: 99 % (10/09/22 1547)   Vital Signs (24h Range):  Temp:  [97 °F (36.1 °C)-98.2 °F (36.8 °C)] 97 °F (36.1 °C)  Pulse:  [61-71] 71  Resp:  [16-20] 16  SpO2:  [94 %-99 %] 99 %  BP: ()/(50-63) 134/61     Weight: 73.5 kg (162 lb)  Body mass index is 26.96 kg/m².    Intake/Output Summary (Last 24 hours) at 10/9/2022 1703  Last  data filed at 10/9/2022 0359  Gross per 24 hour   Intake 100 ml   Output --   Net 100 ml        Physical Exam  Constitutional:       Appearance: She is well-developed.   HENT:      Head: Normocephalic and atraumatic.   Eyes:      General: No scleral icterus.     Pupils: Pupils are equal, round, and reactive to light.   Neck:      Vascular: No JVD.   Cardiovascular:      Rate and Rhythm: Normal rate and regular rhythm.      Heart sounds: No murmur heard.    No friction rub. No gallop.   Pulmonary:      Effort: Pulmonary effort is normal. No respiratory distress.      Breath sounds: Normal breath sounds. No wheezing or rales.   Abdominal:      General: Bowel sounds are normal. There is no distension.      Palpations: Abdomen is soft.      Tenderness: There is no abdominal tenderness. There is no guarding or rebound.   Musculoskeletal:         General: No deformity. Normal range of motion.      Cervical back: Neck supple.      Comments: LLE wound on anterior tibial region with surrounding erythema and swelling which is improving. Without active drainage or fluctuance   Lymphadenopathy:      Cervical: No cervical adenopathy.   Skin:     General: Skin is warm and dry.      Capillary Refill: Capillary refill takes less than 2 seconds.      Findings: No erythema or rash.   Neurological:      Mental Status: She is alert and oriented to person, place, and time.      Cranial Nerves: No cranial nerve deficit.      Sensory: No sensory deficit.   Psychiatric:         Judgment: Judgment normal.       Significant Labs: All pertinent labs within the past 24 hours have been reviewed.  BMP:   Recent Labs   Lab 10/09/22  0738   GLU 99      K 4.2      CO2 21*   BUN 35*   CREATININE 1.3   CALCIUM 8.9   MG 2.0       CBC:   Recent Labs   Lab 10/08/22  0439 10/09/22  0739   WBC 3.07* 3.45*   HGB 9.7* 10.1*   HCT 30.2* 31.1*   * 145*         Significant Imaging: I have reviewed all pertinent imaging results/findings  within the past 24 hours.

## 2022-10-09 NOTE — ASSESSMENT & PLAN NOTE
- nonseptic   - XR RLE showed no acute changes  - lactate wnl  - continue IV clindamycin  (D1- 10/7). Transitioned to oral cefpodoxime and doxycyline on 10/9  - ID consulted. followup recs  - wound care consulted, followup recs

## 2022-10-09 NOTE — NURSING
patient is asking for robitussin. She says she takes the liquid every day at home.     Secure chat to TAYLOR Barboza

## 2022-10-09 NOTE — PROGRESS NOTES
[facility] Medicine  Progress Note    Patient Name: Jenniffer Jimenez  MRN: [id number]  Patient Class: OP- Observation   Admission Date: 10/7/2022  Length of Stay: 0 days  Attending Physician: Chito Jade MD  Primary Care Provider: Rubi Young DO        Subjective:     Principal Problem:Cellulitis of right lower extremity        HPI:  Ms. Jimenez is a 89 year old woman with PMH of HTN, HLD, AFib  s/p Watchman, Diastolic HF, Meniere's disease, venous insufficiency of both lower extremities, CKD III who presents with worsening right leg cellulitis. She reports persistent pain, redness to RLE that began about one month ago. She was recently seen in the ED about 2 week prior to admission and was placed on Augmentin, then later switched to keflex and doxycycline. Despite escalating oral antibiotic regimen, patient's RLE pain and swelling continued to worsen. US Doppler LE negative for DVT on 9/25/22.       In the ED, BP (!) 154/63   Pulse 80   Temp 98 °F (36.7 °C)   Resp 18   LMP  (LMP Unknown)   SpO2 97%   Breastfeeding No . She is sating well on room air. CBC significant for Hb 11.3. CMP significant for bicarb 22. Lactate 1.3. XR RLE showed no acute changes. Patient started on IV clindamycin and admitted to Hospital Medicine for further evaluation and management of RLE cellulitis.       Overview/Hospital Course:  No notes on file    Interval History: Patient lying in bed, no acute distress.No acute events overnight. Patient reports dizziness and fatigue overnight. Discontinued home lasix and spironolactone since patient hypotensive and likely causing her symptoms. Patient denies fever, chills, chest pain, N/V, SOB, dysuria or abdominal pain. Tolerating IV clindamycin and ID consulted to assist with antibiotic guidance since patient's cellulitis was refractory to several other oral antibiotics prior to admission. Wound care consulted and will evaluate patient tomorrow.       Review of  Systems   Constitutional:  Positive for activity change and fatigue. Negative for chills and fever.   HENT:  Negative for congestion and trouble swallowing.    Eyes:  Negative for visual disturbance.   Respiratory:  Negative for cough and shortness of breath.    Cardiovascular:  Negative for chest pain and leg swelling.   Gastrointestinal:  Negative for abdominal distention, abdominal pain, nausea and vomiting.   Endocrine: Negative for cold intolerance, heat intolerance, polydipsia and polyuria.   Genitourinary:  Negative for difficulty urinating and dysuria.   Musculoskeletal:  Negative for back pain and myalgias.   Skin:  Positive for wound.   Neurological:  Positive for weakness. Negative for dizziness and light-headedness.   Hematological:  Negative for adenopathy. Does not bruise/bleed easily.   Psychiatric/Behavioral:  Negative for confusion and sleep disturbance.    Objective:     Vital Signs (Most Recent):  Temp: 97 °F (36.1 °C) (10/09/22 1547)  Pulse: 71 (10/09/22 1547)  Resp: 16 (10/09/22 1547)  BP: 134/61 (10/09/22 1547)  SpO2: 99 % (10/09/22 1547)   Vital Signs (24h Range):  Temp:  [97 °F (36.1 °C)-98.2 °F (36.8 °C)] 97 °F (36.1 °C)  Pulse:  [61-71] 71  Resp:  [16-20] 16  SpO2:  [94 %-99 %] 99 %  BP: ()/(50-63) 134/61     Weight: 73.5 kg (162 lb)  Body mass index is 26.96 kg/m².    Intake/Output Summary (Last 24 hours) at 10/9/2022 1705  Last data filed at 10/9/2022 0359  Gross per 24 hour   Intake 100 ml   Output --   Net 100 ml        Physical Exam  Constitutional:       Appearance: She is well-developed.   HENT:      Head: Normocephalic and atraumatic.   Eyes:      General: No scleral icterus.     Pupils: Pupils are equal, round, and reactive to light.   Neck:      Vascular: No JVD.   Cardiovascular:      Rate and Rhythm: Normal rate and regular rhythm.      Heart sounds: No murmur heard.    No friction rub. No gallop.   Pulmonary:      Effort: Pulmonary effort is normal. No respiratory  distress.      Breath sounds: Normal breath sounds. No wheezing or rales.   Abdominal:      General: Bowel sounds are normal. There is no distension.      Palpations: Abdomen is soft.      Tenderness: There is no abdominal tenderness. There is no guarding or rebound.   Musculoskeletal:         General: No deformity. Normal range of motion.      Cervical back: Neck supple.      Comments: LLE wound on anterior tibial region with surrounding erythema and swelling which is improving. Without active drainage or fluctuance   Lymphadenopathy:      Cervical: No cervical adenopathy.   Skin:     General: Skin is warm and dry.      Capillary Refill: Capillary refill takes less than 2 seconds.      Findings: No erythema or rash.   Neurological:      Mental Status: She is alert and oriented to person, place, and time.      Cranial Nerves: No cranial nerve deficit.      Sensory: No sensory deficit.   Psychiatric:         Judgment: Judgment normal.       Significant Labs: All pertinent labs within the past 24 hours have been reviewed.  BMP:   Recent Labs   Lab 10/09/22  0738   GLU 99      K 4.2      CO2 21*   BUN 35*   CREATININE 1.3   CALCIUM 8.9   MG 2.0       CBC:   Recent Labs   Lab 10/08/22  0439 10/09/22  0739   WBC 3.07* 3.45*   HGB 9.7* 10.1*   HCT 30.2* 31.1*   * 145*         Significant Imaging: I have reviewed all pertinent imaging results/findings within the past 24 hours.      Assessment/Plan:      * Cellulitis of right lower extremity  - nonseptic   - XR RLE showed no acute changes  - lactate wnl  - continue IV clindamycin  (D1- 10/7). Transitioned to oral cefpodoxime and doxycyline on 10/9  - ID consulted. followup recs  - wound care consulted, followup recs      Diabetic polyneuropathy  Patient's FSGs are controlled on current medication regimen.  Last A1c reviewed-   Lab Results   Component Value Date    HGBA1C 6.0 (H) 09/20/2022     Most recent fingerstick glucose reviewed- No results for  input(s): POCTGLUCOSE in the last 24 hours.  Current correctional scale  Low  Maintain anti-hyperglycemic dose as follows-   Antihyperglycemics (From admission, onward)    None        Hold Oral hypoglycemics while patient is in the hospital.  - patient previously prescribed gabapentin but stopped taking it due to concerns about potential side effects    Venous insufficiency of both lower extremities  - continue diuresis and elevate legs       Chronic diastolic heart failure  - compensated state  - holding home lasix and aldactone due to hypotension  -  TTE showed EF 55-60%, grade III DD, severe TR and mild MR, PASP 52 mm Hg  - strict I/Os   - daily weights, preferably standing   - fluid restrict 1.5L while inpatient  - tele  - keep Mg >2, K >4        Stage 3b chronic kidney disease  - Hb at baseline 1.1-1.2  - renally dose medications   - BMP daily       Chronic atrial fibrillation  Patient with Paroxysmal (<7 days) atrial fibrillation which is controlled currently with no medication. Patient is currently in sinus rhythm.[id number] Score: 4. HASBLED Score: Unable to calculate. Anticoagulation not indicated due to age and fall risk.        Meniere's disease  - no active issues  - functional status at baseline      Primary hypertension  - continue home lasix and aldactone  - stable   - monitor       Pure hypercholesterolemia  - continue home statin        VTE Risk Mitigation (From admission, onward)         Ordered     enoxaparin injection 30 mg  Daily         10/09/22 0935     IP VTE HIGH RISK PATIENT  Once         10/07/22 2133     Place sequential compression device  Until discontinued         10/07/22 2133                Discharge Planning   GILES: 10/10/2022     Code Status: Full Code   Is the patient medically ready for discharge?: No    Reason for patient still in hospital (select all that apply): Patient unstable, Patient trending condition, Laboratory test, Treatment and Consult recommendations             Time  spent in care of patient: > 35 minutes           Chito Jade MD  Department of Hospital Medicine   [facility] Surg

## 2022-10-10 ENCOUNTER — PATIENT MESSAGE (OUTPATIENT)
Dept: ALLERGY | Facility: CLINIC | Age: 87
End: 2022-10-10
Payer: MEDICARE

## 2022-10-10 VITALS
HEART RATE: 70 BPM | OXYGEN SATURATION: 99 % | HEIGHT: 65 IN | WEIGHT: 162 LBS | TEMPERATURE: 98 F | DIASTOLIC BLOOD PRESSURE: 68 MMHG | BODY MASS INDEX: 26.99 KG/M2 | RESPIRATION RATE: 16 BRPM | SYSTOLIC BLOOD PRESSURE: 155 MMHG

## 2022-10-10 LAB
ALBUMIN SERPL BCP-MCNC: 3.2 G/DL (ref 3.5–5.2)
ALP SERPL-CCNC: 95 U/L (ref 55–135)
ALT SERPL W/O P-5'-P-CCNC: 9 U/L (ref 10–44)
ANION GAP SERPL CALC-SCNC: 10 MMOL/L (ref 8–16)
AST SERPL-CCNC: 12 U/L (ref 10–40)
BASOPHILS # BLD AUTO: 0.02 K/UL (ref 0–0.2)
BASOPHILS NFR BLD: 0.6 % (ref 0–1.9)
BILIRUB SERPL-MCNC: 0.4 MG/DL (ref 0.1–1)
BUN SERPL-MCNC: 38 MG/DL (ref 8–23)
CALCIUM SERPL-MCNC: 8.8 MG/DL (ref 8.7–10.5)
CHLORIDE SERPL-SCNC: 107 MMOL/L (ref 95–110)
CO2 SERPL-SCNC: 19 MMOL/L (ref 23–29)
CREAT SERPL-MCNC: 1.2 MG/DL (ref 0.5–1.4)
DIFFERENTIAL METHOD: ABNORMAL
EOSINOPHIL # BLD AUTO: 0.1 K/UL (ref 0–0.5)
EOSINOPHIL NFR BLD: 2.9 % (ref 0–8)
ERYTHROCYTE [DISTWIDTH] IN BLOOD BY AUTOMATED COUNT: 15.7 % (ref 11.5–14.5)
EST. GFR  (NO RACE VARIABLE): 43 ML/MIN/1.73 M^2
GLUCOSE SERPL-MCNC: 93 MG/DL (ref 70–110)
HCT VFR BLD AUTO: 29.7 % (ref 37–48.5)
HGB BLD-MCNC: 9.6 G/DL (ref 12–16)
IMM GRANULOCYTES # BLD AUTO: 0.01 K/UL (ref 0–0.04)
IMM GRANULOCYTES NFR BLD AUTO: 0.3 % (ref 0–0.5)
LYMPHOCYTES # BLD AUTO: 0.9 K/UL (ref 1–4.8)
LYMPHOCYTES NFR BLD: 28.4 % (ref 18–48)
MAGNESIUM SERPL-MCNC: 2 MG/DL (ref 1.6–2.6)
MCH RBC QN AUTO: 29 PG (ref 27–31)
MCHC RBC AUTO-ENTMCNC: 32.3 G/DL (ref 32–36)
MCV RBC AUTO: 90 FL (ref 82–98)
MONOCYTES # BLD AUTO: 0.4 K/UL (ref 0.3–1)
MONOCYTES NFR BLD: 12.8 % (ref 4–15)
NEUTROPHILS # BLD AUTO: 1.7 K/UL (ref 1.8–7.7)
NEUTROPHILS NFR BLD: 55 % (ref 38–73)
NRBC BLD-RTO: 0 /100 WBC
PHOSPHATE SERPL-MCNC: 4.5 MG/DL (ref 2.7–4.5)
PLATELET # BLD AUTO: 140 K/UL (ref 150–450)
PMV BLD AUTO: 11.2 FL (ref 9.2–12.9)
POTASSIUM SERPL-SCNC: 4.3 MMOL/L (ref 3.5–5.1)
PROT SERPL-MCNC: 5.5 G/DL (ref 6–8.4)
RBC # BLD AUTO: 3.31 M/UL (ref 4–5.4)
SODIUM SERPL-SCNC: 136 MMOL/L (ref 136–145)
WBC # BLD AUTO: 3.13 K/UL (ref 3.9–12.7)

## 2022-10-10 PROCEDURE — 25000242 PHARM REV CODE 250 ALT 637 W/ HCPCS: Performed by: INTERNAL MEDICINE

## 2022-10-10 PROCEDURE — 83735 ASSAY OF MAGNESIUM: CPT | Performed by: INTERNAL MEDICINE

## 2022-10-10 PROCEDURE — 84100 ASSAY OF PHOSPHORUS: CPT | Performed by: INTERNAL MEDICINE

## 2022-10-10 PROCEDURE — 80053 COMPREHEN METABOLIC PANEL: CPT | Performed by: INTERNAL MEDICINE

## 2022-10-10 PROCEDURE — G0378 HOSPITAL OBSERVATION PER HR: HCPCS

## 2022-10-10 PROCEDURE — 99217 PR OBSERVATION CARE DISCHARGE: CPT | Mod: ,,, | Performed by: INTERNAL MEDICINE

## 2022-10-10 PROCEDURE — 99204 OFFICE O/P NEW MOD 45 MIN: CPT | Mod: ,,, | Performed by: INTERNAL MEDICINE

## 2022-10-10 PROCEDURE — 25000003 PHARM REV CODE 250: Performed by: INTERNAL MEDICINE

## 2022-10-10 PROCEDURE — 85025 COMPLETE CBC W/AUTO DIFF WBC: CPT | Performed by: INTERNAL MEDICINE

## 2022-10-10 PROCEDURE — 36415 COLL VENOUS BLD VENIPUNCTURE: CPT | Performed by: INTERNAL MEDICINE

## 2022-10-10 PROCEDURE — 99204 PR OFFICE/OUTPT VISIT, NEW, LEVL IV, 45-59 MIN: ICD-10-PCS | Mod: ,,, | Performed by: INTERNAL MEDICINE

## 2022-10-10 PROCEDURE — 99217 PR OBSERVATION CARE DISCHARGE: ICD-10-PCS | Mod: ,,, | Performed by: INTERNAL MEDICINE

## 2022-10-10 RX ORDER — CEFDINIR 300 MG/1
300 CAPSULE ORAL 2 TIMES DAILY
Qty: 28 CAPSULE | Refills: 0 | Status: SHIPPED | OUTPATIENT
Start: 2022-10-10 | End: 2022-10-20 | Stop reason: SDUPTHER

## 2022-10-10 RX ORDER — HYDROXYZINE HYDROCHLORIDE 25 MG/1
25 TABLET, FILM COATED ORAL 3 TIMES DAILY PRN
Qty: 90 TABLET | Refills: 0 | Status: ON HOLD | OUTPATIENT
Start: 2022-10-10 | End: 2022-11-08

## 2022-10-10 RX ORDER — DOXYCYCLINE HYCLATE 100 MG
100 TABLET ORAL 2 TIMES DAILY
Qty: 28 TABLET | Refills: 0 | Status: SHIPPED | OUTPATIENT
Start: 2022-10-10 | End: 2022-10-24

## 2022-10-10 RX ORDER — FUROSEMIDE 20 MG/1
20 TABLET ORAL
Qty: 180 TABLET | Refills: 3 | Status: SHIPPED | OUTPATIENT
Start: 2022-10-10 | End: 2022-11-07

## 2022-10-10 RX ORDER — ASPIRIN 81 MG/1
81 TABLET ORAL DAILY
Qty: 90 TABLET | Refills: 3 | Status: SHIPPED | OUTPATIENT
Start: 2022-10-10 | End: 2024-01-27 | Stop reason: SDUPTHER

## 2022-10-10 RX ADMIN — PSYLLIUM HUSK 1 PACKET: 3.4 POWDER ORAL at 10:10

## 2022-10-10 RX ADMIN — PRAVASTATIN SODIUM 10 MG: 10 TABLET ORAL at 10:10

## 2022-10-10 RX ADMIN — ASPIRIN 81 MG: 81 TABLET, COATED ORAL at 10:10

## 2022-10-10 RX ADMIN — CHOLECALCIFEROL TAB 25 MCG (1000 UNIT) 1000 UNITS: 25 TAB at 10:10

## 2022-10-10 RX ADMIN — DOXYCYCLINE HYCLATE 100 MG: 100 TABLET, COATED ORAL at 10:10

## 2022-10-10 RX ADMIN — FUROSEMIDE 20 MG: 20 TABLET ORAL at 09:10

## 2022-10-10 RX ADMIN — AZELASTINE HYDROCHLORIDE 137 MCG: 137 SPRAY, METERED NASAL at 09:10

## 2022-10-10 RX ADMIN — FUROSEMIDE 20 MG: 20 TABLET ORAL at 04:10

## 2022-10-10 RX ADMIN — HYDROXYZINE HYDROCHLORIDE 25 MG: 25 TABLET, FILM COATED ORAL at 11:10

## 2022-10-10 RX ADMIN — Medication 400 UNITS: at 10:10

## 2022-10-10 RX ADMIN — ACETAMINOPHEN 650 MG: 325 TABLET ORAL at 10:10

## 2022-10-10 RX ADMIN — CEFPODOXIME PROXETIL 200 MG: 200 TABLET, FILM COATED ORAL at 10:10

## 2022-10-10 NOTE — HPI
Ms. Jimenez is a 89 year old woman with PMH of HTN, HLD, AFib  s/p Watchman, Diastolic HF, Meniere's disease, venous insufficiency of both lower extremities, CKD III who presents with worsening right leg cellulitis 10/7. She reports redness and swelling to RLE that began about one month ago. She was seen by PCP for the leg on 9/20 and given Augmentin.  She was recently seen in the ED about 2 week prior to admission and was placed on keflex and doxycycline. US Doppler LE negative for DVT on 9/25/22.  Despite oral antibiotic regimen she has failed to improve, patient's RLE redness and swelling continued to worsen.  She does not recall any trauma and feels all prior po abx did not help significantly.  She denies any significant pain unless the leg is pressed. The patient denies any recent fever, chills, or sweats.      Patient admitted and started in IV clindamycin.  Per notes she improved and was changed to Doxy and cefpodoxime.  Per dw with patient she feels leg is unchanged.  Labs reveal sed rate 18, CRP 2.1 and mild leukopenia.  Xray of R tib/fib without acute process.  Afebrile.  ID consulted for abx recs.

## 2022-10-10 NOTE — PLAN OF CARE
[facility]isco liana - [facility] S[facility]  Discharge Assessment    Primary Care Provider: Rubi Young DO     Discharge Assessment (most recent)       BRIEF DISCHARGE ASSESSMENT - 10/10/22 0908          Discharge Planning    Assessment Type Discharge Planning Brief Assessment     Resource/Environmental Concerns reliable transportation     Transportation Concerns no car     Support Systems Children     Equipment Currently Used at Home rollator     Current Living Arrangements home/apartment/condo     Patient/Family Anticipates Transition to home     DME Needed Upon Discharge  none     Discharge Plan A Home with family     Discharge Plan B Home with family                       Pt has rollator at bedside.  Discharging today.  Needs a w/c transport home.  Pcp f/u appointment made.     DCP: tania North RN [facility] [phone]

## 2022-10-10 NOTE — ASSESSMENT & PLAN NOTE
89 yo female with Hx diastolic HF and venous insufficiency of BL LEs with pw 1 month R LE edema and redness with cf cellulitis but has had multiple po abx without improvement. Sed rate and CRP WNL so suspicion of cellulitis lower.  No DVT on US 9/25 and no systemic signs of infection.  Suspect chronic venous insufficiency and fluid retention secondary heart failure contributing with possible mild sign of cellulitis.  Was treated with clinda and now on doxy cefpodoxime.  After dw her suspect she she has heightened concern as her diabetic sister had to have LE amputation secondary to infection.     Plan:  · Continue Doxy/cefpodoxime x14d  · Rec outpt wound care and vasc sx fu for wound care and evaluation of vascular cause contributing to symptoms  · FU in ID clinic in 10-14  · Seen with ID staff   · Will sign off  · Plan discussed with Dr. Jade

## 2022-10-10 NOTE — PLAN OF CARE
Francisco Fried - [facility]      HOME HEALTH ORDERS  FACE TO FACE ENCOUNTER    Patient Name: Jenniffer Jimenez  YOB: 1932    PCP: Rubi Young DO   PCP Address: [address] / [place] [address]  PCP Phone Number: [phone]  PCP Fax: [phone]    Encounter Date: 10/7/22    Admit to Home Health    Diagnoses:  Active Hospital Problems    Diagnosis  POA    *Cellulitis of right lower extremity [L03.115]  Yes    Diabetic polyneuropathy [E11.42]  Yes    Venous insufficiency of both lower extremities [I87.2]  Yes    Chronic diastolic heart failure [I50.32]  Yes     Chronic     TTE (10/7/2022):    The left ventricle is normal in size with concentric remodeling and normal systolic function.  The estimated ejection fraction is 55-60%.  Grade III left ventricular diastolic dysfunction.  Mild mitral regurgitation.  Normal right ventricular size with mildly reduced right ventricular systolic function.  Severe tricuspid regurgitation.  The estimated PA systolic pressure is 52 mmHg. PASP may be under-estimated due to TR severity.  Elevated central venous pressure (15 mmHg).  There is pulmonary hypertension.  Severe left atrial enlargement.           Stage 3b chronic kidney disease [N18.32]  Yes    Chronic atrial fibrillation [I48.20]  Yes     Chronic    Meniere's disease [H81.09]  Yes    Primary hypertension [I10]  Yes     Chronic    Pure hypercholesterolemia [E78.00]  Yes     Chronic      Resolved Hospital Problems   No resolved problems to display.       Follow Up Appointments:  Future Appointments   Date Time Provider Department Center   10/17/2022  8:30 AM Luis Scott MD [facility] OPHTHAL [place]   10/20/2022  9:30 AM Venu David Jr., PA [facility] ID [facility]   10/20/2022 10:00 AM José Miguel Espinoza MD [facility] IM [place]   11/8/2022  2:00 PM DO [facility] [place]   3/30/2023 10:00 AM Rubi Young DO [facility] IM [place]       Allergies:  Review of patient's  "allergies indicates:   Allergen Reactions    Opioids - morphine analogues Other (See Comments)     Bowel issues; bowel obstruction    Tizanidine Other (See Comments)     "Lips were numb,  Almost passed out."    Tramadol Hallucinations    Beta-blockers (beta-adrenergic blocking agts) Other (See Comments)     Can not go on beta blockers for long period of time - due to taking allergy injections    Morphine     Opioids-meperidine and related        Medications: Review discharge medications with patient and family and provide education.    Current Facility-Administered Medications   Medication Dose Route Frequency Provider Last Rate Last Admin    acetaminophen tablet 650 mg  650 mg Oral Q4H PRN Chito Jade MD   650 mg at 10/10/22 1000    aspirin EC tablet 81 mg  81 mg Oral Daily Chito Jade MD   81 mg at 10/10/22 1000    azelastine 137 mcg (0.1 %) nasal spray 137 mcg  1 spray Nasal BID Chito Jade MD   137 mcg at 10/10/22 0900    cefpodoxime tablet 200 mg  200 mg Oral Daily Chito Jade MD   200 mg at 10/10/22 1000    dextrose 10% bolus 125 mL  12.5 g Intravenous PRN Chito Jade MD        dextrose 10% bolus 250 mL  25 g Intravenous PRN Chito Jade MD        doxycycline tablet 100 mg  100 mg Oral BID Chito Jade MD   100 mg at 10/10/22 1000    enoxaparin injection 30 mg  30 mg Subcutaneous Daily Chito Jade MD   30 mg at 10/09/22 1734    furosemide tablet 20 mg  20 mg Oral BID AC Chito Jade MD   20 mg at 10/10/22 0945    glucagon (human recombinant) injection 1 mg  1 mg Intramuscular PRN Chito Jade MD        glucose chewable tablet 16 g  16 g Oral PRN Chito Jade MD        glucose chewable tablet 24 g  24 g Oral PRN Chito Jade MD        guaiFENesin 100 mg/5 ml syrup 200 mg  200 mg Oral Q6H PRN Ciera Barboza PA-C   200 mg at 10/09/22 2002    hydrOXYzine HCL tablet 25 mg  25 mg Oral TID PRN Chito Jade MD   25 mg at 10/10/22 1149    " melatonin tablet 6 mg  6 mg Oral Nightly PRN Chito Jade MD        [START ON 10/11/2022] miconazole nitrate 2% ointment   Topical (Top) Every other day Chito Jade MD        naloxone 0.4 mg/mL injection 0.02 mg  0.02 mg Intravenous PRN Chito Jade MD        ondansetron injection 4 mg  4 mg Intravenous Q8H PRN Chito Jade MD        polyethylene glycol packet 17 g  17 g Oral BID PRN Chito Jade MD        pravastatin tablet 10 mg  10 mg Oral Daily Chito Jade MD   10 mg at 10/10/22 1000    prochlorperazine injection Soln 5 mg  5 mg Intravenous Q6H PRN Chito Jade MD        psyllium husk (aspartame) 3.4 gram packet 1 packet  1 packet Oral Daily Chito Jade MD   1 packet at 10/10/22 1000    sars-cov-2 (covid-19 pfizer omicron) 30 mcg/0.3 mL injection 0.3 mL  0.3 mL Intramuscular vaccine x 1 dose Chito Jade MD        sodium chloride 0.9% flush 10 mL  10 mL Intravenous Q12H PRN Chito Jade MD        vitamin D 1000 units tablet 1,000 Units  1,000 Units Oral Daily Chito Jade MD   1,000 Units at 10/10/22 1000    vitamin E capsule 400 Units  400 Units Oral Daily Chito Jade MD   400 Units at 10/10/22 1000     Current Discharge Medication List        START taking these medications    Details   cefdinir (OMNICEF) 300 MG capsule Take 1 capsule (300 mg total) by mouth 2 (two) times daily. for 14 days  Qty: 28 capsule, Refills: 0      doxycycline (VIBRA-TABS) 100 MG tablet Take 1 tablet (100 mg total) by mouth 2 (two) times a day. for 14 days  Qty: 28 tablet, Refills: 0      miconazole nitrate 2% (MICOTIN) 2 % Oint Apply topically every other day.  Qty: 28.4 g, Refills: 2           CONTINUE these medications which have CHANGED    Details   aspirin (ECOTRIN) 81 MG EC tablet Take 1 tablet (81 mg total) by mouth once daily.  Qty: 90 tablet, Refills: 3    Associated Diagnoses: Chronic atrial fibrillation      hydrOXYzine HCL (ATARAX) 25 MG tablet Take 1 tablet  (25 mg total) by mouth 3 (three) times daily as needed for Itching.  Qty: 90 tablet, Refills: 0           CONTINUE these medications which have NOT CHANGED    Details   furosemide (LASIX) 20 MG tablet Take 1 tablet (20 mg total) by mouth 2 (two) times daily before meals.  Qty: 180 tablet, Refills: 3    Associated Diagnoses: Chronic diastolic heart failure      pravastatin (PRAVACHOL) 40 MG tablet Take 1 tablet (40 mg total) by mouth once daily.  Qty: 90 tablet, Refills: 3    Associated Diagnoses: Pure hypercholesterolemia      spironolactone (ALDACTONE) 25 MG tablet Take 1 tablet (25 mg total) by mouth 2 (two) times daily before meals.  Qty: 180 tablet, Refills: 3    Comments: .  She will take 1 or 2 tablets daily as needed for swelling  Associated Diagnoses: Chronic diastolic heart failure; Essential hypertension      azelastine (ASTELIN) 137 mcg (0.1 %) nasal spray 1 spray (137 mcg total) by Nasal route 2 (two) times daily.  Qty: 30 mL, Refills: 1    Associated Diagnoses: Acute bacterial sinusitis      cholecalciferol, vitamin D3, (VITAMIN D3 ORAL) Take 1 tablet by mouth once daily.      fluticasone propionate (FLONASE) 50 mcg/actuation nasal spray USE 1 SPRAY IN EACH NOSTRIL ONE TIME DAILY  Qty: 32 g, Refills: 11      psyllium 0.52 gram capsule Take 9 g by mouth once daily. Pt takes 3 capsules 3 times a day      vit C/E/Zn/coppr/lutein/zeaxan (OCUVITE LUTEIN AND ZEAXANTHIN ORAL) Take 1 capsule by mouth once daily. Lutien 25 mg, Zeaxanthin 5 mg      vitamin E 400 UNIT capsule Take 400 Units by mouth once daily.           STOP taking these medications       diclofenac sodium (VOLTAREN) 1 % Gel Comments:   Reason for Stopping:         mupirocin (BACTROBAN) 2 % ointment Comments:   Reason for Stopping:                 I have seen and examined this patient within the last 30 days. My clinical findings that support the need for the home health skilled services and home bound status are the  following:no   Weakness/numbness causing balance and gait disturbance due to Heart Failure, Infection, Weakness/Debility, Anemia, and Dehydration making it taxing to leave home.     Diet:   2 gram sodium diet    Labs:  SN to perform labs:  CBC: Weekly; 2 week(s) and CMP: Weekly; 2 week(s) and Report Lab results to PCP.    Referrals/ Consults  None    Activities:   activity as tolerated    Nursing:   Agency to admit patient within 24 hours of hospital discharge unless specified on physician order or at patient request    SN to complete comprehensive assessment including routine vital signs. Instruct on disease process and s/s of complications to report to MD. Review/verify medication list sent home with the patient at time of discharge  and instruct patient/caregiver as needed. Frequency may be adjusted depending on start of care date.     Skilled nurse to perform up to 3 visits PRN for symptoms related to diagnosis    Notify MD if SBP > 160 or < 90; DBP > 90 or < 50; HR > 120 or < 50; Temp > 101; O2 < 88%    Ok to schedule additional visits based on staff availability and patient request on consecutive days within the home health episode.    When multiple disciplines ordered:    Start of Care occurs on Sunday - Wednesday schedule remaining discipline evaluations as ordered on separate consecutive days following the start of care.    Thursday SOC -schedule subsequent evaluations Friday and Monday the following week.     Friday - Saturday SOC - schedule subsequent discipline evaluations on consecutive days starting Monday of the following week.    For all post-discharge communication and subsequent orders please contact patient's primary care physician. If unable to reach primary care physician or do not receive response within 30 minutes, please contact hospitalist on-call for clinical staff order clarification    Miscellaneous   Routine Skin for Bedridden Patients: Instruct patient/caregiver to apply moisture  barrier cream to all skin folds and wet areas in perineal area daily and after baths and all bowel movements.  Heart Failure:      SN to instruct on the following:    Instruct on the definition of CHF.   Instruct on the signs/sympoms of CHF to be reported.   Instruct on and monitor daily weights.   Instruct on factors that cause exacerbation.   Instruct on action, dose, schedule, and side effects of medications.   Instruct on diet as prescribed.   Instruct on activity allowed.   Instruct on life-style modifications for life long management of CHF   SN to assess compliance with daily weights, diet, medications, fluid retention,    safety precautions, activities permitted and life-style modifications.   Additional 1-2 SN visits per week as needed for signs and symptoms     of CHF exacerbation.      For Weight Gain > 2-3 lbs in 1 day or 4-6 lbs over 1 week notify PCP    Home Health Aide:  Nursing Three times weekly    Wound Care Orders  Cleanse right leg with soap and water pat dry apply antifungal ointment cover with a Vaseline gauze, the dry gauze or a abd pad wrap with kerlix and secure with tape every other day and prn.    I certify that this patient is confined to her home and needs intermittent skilled nursing care.

## 2022-10-10 NOTE — PROGRESS NOTES
[facility] Surg  Wound Care    Patient Name:  Jenniffer Jimenez   MRN:  [id number]  Date: 10/10/2022  Diagnosis: Cellulitis of right lower extremity    History:     Past Medical History:   Diagnosis Date    Amblyopia of left eye 4/10/2013    Anxiety     Arthritis     Facet arthropathy, Lumbosacral    Cataract     Central retinal vein occlusion of left eye     Depression     Diabetic polyneuropathy 2022    Exotropia of both eyes 2013    recession RSR 5.0mm w/ adj; recession LR os 5.0 w/ adj; resect MR os  4.0mm    Hearing loss     History of resection of small bowel     Hypertensive retinopathy of both eyes     Hypoglycemia     Macular degeneration     OA (osteoarthritis) of shoulder     Right    Osteoporosis     Posterior vitreous detachment of both eyes     Rhinitis     TIA (transient ischemic attack)        Social History     Socioeconomic History    Marital status:    Occupational History    Occupation: retired   Tobacco Use    Smoking status: Former     Types: Cigarettes     Quit date: 10/29/1982     Years since quittin.9    Smokeless tobacco: Never   Substance and Sexual Activity    Alcohol use: Yes     Alcohol/week: 2.0 standard drinks     Types: 2 Shots of liquor per week     Comment: rare    Drug use: No    Sexual activity: Never   Other Topics Concern    Are you pregnant or think you may be? No    Breast-feeding No     Social Determinants of Health     Financial Resource Strain: Low Risk     Difficulty of Paying Living Expenses: Not hard at all   Food Insecurity: No Food Insecurity    Worried About Running Out of Food in the Last Year: Never true    Ran Out of Food in the Last Year: Never true   Transportation Needs: No Transportation Needs    Lack of Transportation (Medical): No    Lack of Transportation (Non-Medical): No   Physical Activity: Inactive    Days of Exercise per Week: 0 days    Minutes of Exercise per Session: 0 min   Stress: Stress Concern Present    Feeling of Stress  : To some extent   Social Connections: Socially Isolated    Frequency of Communication with Friends and Family: Once a week    Frequency of Social Gatherings with Friends and Family: Once a week    Attends Spiritism Services: Never    Active Member of Clubs or Organizations: No    Attends Club or Organization Meetings: Never    Marital Status:    Housing Stability: Unknown    Unable to Pay for Housing in the Last Year: No    Unstable Housing in the Last Year: No       Precautions:     Allergies as of 10/07/2022 - Reviewed 10/07/2022   Allergen Reaction Noted    Opioids - morphine analogues Other (See Comments) 08/15/2018    Tizanidine Other (See Comments) 04/06/2018    Tramadol Hallucinations 01/18/2013    Beta-blockers (beta-adrenergic blocking agts) Other (See Comments) 10/23/2013    Morphine  12/24/2021    Opioids-meperidine and related  01/04/2022       [facility] Assessment Details/Treatment   (Add Subjective Assessment as Narrative)  Patient consult for wound care to right lower extremity. The right leg have dry flaky scab to the skin. This site is cleanse with soap and water pat dry apply antifungal ointment cover with a vaseline gauze, cover with dry gauze or abd pad for absorption wrap with kerlix secure with tape every other day and prn for saturation.    (Insert flowsheet data here)    Recommendations made to primary team for the aboveOrders placed.     10/10/2022

## 2022-10-10 NOTE — DISCHARGE INSTRUCTIONS
Wound care instructions:  cleanse right leg with soap and water pat dry apply antifungal ointment cover with a Vaseline gauze, the dry gauze or a abd pad wrap with kerlix and secure with tape every other day and prn.      To keep the extra fluid off your body, you will need to limit your sodium and fluid intake.     Salt intake: No more than 2 grams of sodium per day      Fluid intake:  No more than 2.0 liters per day OR (50 - 70 ounces per day) OR (6 - 8 cups per day)    Weight yourself every morning around the same time. For Weight Gain > 2-3 lbs in 1 day or 4-6 lbs over 1 week notify PCP, cardiologist and/or heart failure clinic.

## 2022-10-10 NOTE — CONSULTS
[facility] Surg  Infectious Disease  Consult Note    Patient Name: Jenniffer Jimenez  MRN: [id number]  Admission Date: 10/7/2022  Hospital Length of Stay: 0 days  Attending Physician: Chito Jade MD  Primary Care Provider: Rubi Young DO     Isolation Status: No active isolations    Patient information was obtained from patient and ER records.      Inpatient consult to Infectious Diseases  Consult performed by: TAYLOR Carlos Jr.  Consult ordered by: Chito Jade MD      Assessment/Plan:     * Cellulitis of right lower extremity  89 yo female with Hx diastolic HF and venous insufficiency of BL LEs with pw 1 month R LE edema and redness with cf cellulitis but has had multiple po abx without improvement. Sed rate and CRP WNL so suspicion of cellulitis lower.  No DVT on US 9/25 and no systemic signs of infection.  Suspect chronic venous insufficiency and fluid retention secondary heart failure contributing with possible mild sign of cellulitis.  Was treated with clinda and now on doxy cefpodoxime.  After dw her suspect she she has heightened concern as her diabetic sister had to have LE amputation secondary to infection.     Plan:  Continue Doxy/cefpodoxime x14d  Rec outpt wound care and vasc sx fu for wound care and evaluation of vascular cause contributing to symptoms  FU in ID clinic in 10-14  Seen with ID staff   Will sign off  Plan discussed with Dr. Jade    Venous insufficiency of both lower extremities  Rec outpt vasc sx eval ASAP post dc        Thank you for your consult. I will sign off. Please contact us if you have any additional questions.    TAYLOR Bowers  Infectious Disease  [facility] Surg    Subjective:     Principal Problem: Cellulitis of right lower extremity    HPI: Ms. Jimenez is a 89 year old woman with PMH of HTN, HLD, AFib  s/p Watchman, Diastolic HF, Meniere's disease, venous insufficiency of both lower extremities, CKD III who presents with worsening  right leg cellulitis 10/7. She reports redness and swelling to RLE that began about one month ago. She was seen by PCP for the leg on  and given Augmentin.  She was recently seen in the ED about 2 week prior to admission and was placed on keflex and doxycycline. US Doppler LE negative for DVT on 22.  Despite oral antibiotic regimen she has failed to improve, patient's RLE redness and swelling continued to worsen.  She does not recall any trauma and feels all prior po abx did not help significantly.  She denies any significant pain unless the leg is pressed. The patient denies any recent fever, chills, or sweats.      Patient admitted and started in IV clindamycin.  Per notes she improved and was changed to Doxy and cefpodoxime.  Per dw with patient she feels leg is unchanged.  Labs reveal sed rate 18, CRP 2.1 and mild leukopenia.  Xray of R tib/fib without acute process.  Afebrile.  ID consulted for abx recs.          Past Medical History:   Diagnosis Date    Amblyopia of left eye 4/10/2013    Anxiety     Arthritis     Facet arthropathy, Lumbosacral    Cataract     Central retinal vein occlusion of left eye     Depression     Diabetic polyneuropathy 2022    Exotropia of both eyes 2013    recession RSR 5.0mm w/ adj; recession LR os 5.0 w/ adj; resect MR os  4.0mm    Hearing loss     History of resection of small bowel     Hypertensive retinopathy of both eyes     Hypoglycemia     Macular degeneration     OA (osteoarthritis) of shoulder     Right    Osteoporosis     Posterior vitreous detachment of both eyes     Rhinitis     TIA (transient ischemic attack)        Past Surgical History:   Procedure Laterality Date    APPENDECTOMY      CARDIAC CATHETERIZATION      CATARACT EXTRACTION W/  INTRAOCULAR LENS IMPLANT Bilateral      SECTION, CLASSIC      CLOSURE OF LEFT ATRIAL APPENDAGE USING DEVICE N/A 2020    Procedure: Left atrial appendage closure device;  Surgeon: Abundio Curtis MD;   "Location: [facility] CATH LAB;  Service: Cardiology;  Laterality: N/A;    HYSTERECTOMY      INNER EAR SURGERY      JOINT REPLACEMENT      LEFT KNEE REPLACEMENT IN 2013 -    OOPHORECTOMY      SINUS SURGERY      STRABISMUS SURGERY  2/6/13    RSR recession 5 mm, LLR recession 5 mm and LMR resection 4mm    STRABISMUS SURGERY  03/19/2014    recess LR OD 6mm    TONSILLECTOMY      watchman surgery N/A 11/2020       Review of patient's allergies indicates:   Allergen Reactions    Opioids - morphine analogues Other (See Comments)     Bowel issues; bowel obstruction    Tizanidine Other (See Comments)     "Lips were numb,  Almost passed out."    Tramadol Hallucinations    Beta-blockers (beta-adrenergic blocking agts) Other (See Comments)     Can not go on beta blockers for long period of time - due to taking allergy injections    Morphine     Opioids-meperidine and related        Medications:  Medications Prior to Admission   Medication Sig    furosemide (LASIX) 20 MG tablet Take 1 tablet (20 mg total) by mouth 2 (two) times daily before meals.    pravastatin (PRAVACHOL) 40 MG tablet Take 1 tablet (40 mg total) by mouth once daily.    spironolactone (ALDACTONE) 25 MG tablet Take 1 tablet (25 mg total) by mouth 2 (two) times daily before meals.    [DISCONTINUED] aspirin (ECOTRIN) 81 MG EC tablet Take 1 tablet (81 mg total) by mouth once daily.    azelastine (ASTELIN) 137 mcg (0.1 %) nasal spray 1 spray (137 mcg total) by Nasal route 2 (two) times daily.    cholecalciferol, vitamin D3, (VITAMIN D3 ORAL) Take 1 tablet by mouth once daily.    fluticasone propionate (FLONASE) 50 mcg/actuation nasal spray USE 1 SPRAY IN EACH NOSTRIL ONE TIME DAILY    psyllium 0.52 gram capsule Take 9 g by mouth once daily. Pt takes 3 capsules 3 times a day    vit C/E/Zn/coppr/lutein/zeaxan (OCUVITE LUTEIN AND ZEAXANTHIN ORAL) Take 1 capsule by mouth once daily. Lutien 25 mg, Zeaxanthin 5 mg    vitamin E 400 UNIT capsule Take 400 Units by mouth once daily. "    [DISCONTINUED] diclofenac sodium (VOLTAREN) 1 % Gel Apply 2 g topically 4 (four) times daily. Use gloves to apply for 10 days    [DISCONTINUED] mupirocin (BACTROBAN) 2 % ointment Apply topically 3 (three) times daily. To right leg wound     Antibiotics (From admission, onward)      Start     Stop Route Frequency Ordered    10/10/22 0900  cefpodoxime tablet 200 mg         -- Oral Daily 10/09/22 1710    10/09/22 2100  doxycycline tablet 100 mg         -- Oral 2 times daily 10/09/22 1710          Antifungals (From admission, onward)      Start     Stop Route Frequency Ordered    10/11/22 0900  miconazole nitrate 2% ointment         -- Top Every other day 10/10/22 1259          Antivirals (From admission, onward)      None             Immunization History   Administered Date(s) Administered    COVID-19, MRNA, LN-S, PF (Pfizer) (Gray Cap) 05/01/2022    COVID-19, MRNA, LN-S, PF (Pfizer) (Purple Cap) 01/14/2021, 02/05/2021, 09/17/2021    Influenza (FLUAD) - Quadrivalent - Adjuvanted - PF *Preferred* (65+) 11/05/2020, 09/29/2022    Influenza - High Dose - PF (65 years and older) 09/19/2012, 09/24/2013, 10/08/2014, 09/21/2015, 10/01/2016, 09/21/2017, 08/30/2018    Influenza - Quadrivalent 09/04/2019, 09/04/2019    Influenza - Quadrivalent - High Dose - PF (65 years and older) 09/17/2021    Influenza - Trivalent (ADULT) 10/20/2006, 10/03/2007, 10/06/2008, 09/24/2009, 09/28/2010, 09/20/2011    Influenza A (H1N1) 2009 Monovalent - IM - PF 01/06/2010    Pneumococcal Conjugate - 13 Valent 09/14/2015    Pneumococcal Polysaccharide - 23 Valent 10/20/2001, 10/04/2010    Tdap 03/11/2018    Zoster Recombinant 03/12/2020, 07/12/2020       Family History       Problem Relation (Age of Onset)    Breast cancer Maternal Aunt    Diabetes Sister, Brother    Heart disease Sister, Sister    Hypertension Mother, Father    Liver disease Sister    No Known Problems Maternal Uncle, Paternal Aunt, Paternal Uncle, Maternal Grandmother, Maternal  Grandfather, Paternal Grandmother, Paternal Grandfather, Daughter, Son, Son, Son          Social History     Socioeconomic History    Marital status:    Occupational History    Occupation: retired   Tobacco Use    Smoking status: Former     Types: Cigarettes     Quit date: 10/29/1982     Years since quittin.9    Smokeless tobacco: Never   Substance and Sexual Activity    Alcohol use: Yes     Alcohol/week: 2.0 standard drinks     Types: 2 Shots of liquor per week     Comment: rare    Drug use: No    Sexual activity: Never   Other Topics Concern    Are you pregnant or think you may be? No    Breast-feeding No     Social Determinants of Health     Financial Resource Strain: Low Risk     Difficulty of Paying Living Expenses: Not hard at all   Food Insecurity: No Food Insecurity    Worried About Running Out of Food in the Last Year: Never true    Ran Out of Food in the Last Year: Never true   Transportation Needs: No Transportation Needs    Lack of Transportation (Medical): No    Lack of Transportation (Non-Medical): No   Physical Activity: Inactive    Days of Exercise per Week: 0 days    Minutes of Exercise per Session: 0 min   Stress: Stress Concern Present    Feeling of Stress : To some extent   Social Connections: Socially Isolated    Frequency of Communication with Friends and Family: Once a week    Frequency of Social Gatherings with Friends and Family: Once a week    Attends Adventist Services: Never    Active Member of Clubs or Organizations: No    Attends Club or Organization Meetings: Never    Marital Status:    Housing Stability: Unknown    Unable to Pay for Housing in the Last Year: No    Unstable Housing in the Last Year: No     Review of Systems   Constitutional:  Negative for appetite change, chills, diaphoresis, fatigue, fever and unexpected weight change.   HENT:  Negative for congestion, ear pain, hearing loss, sore throat and tinnitus.    Eyes:  Negative for pain, redness and  visual disturbance.   Respiratory:  Negative for cough, chest tightness, shortness of breath and wheezing.    Cardiovascular:  Negative for chest pain.   Gastrointestinal:  Negative for abdominal pain, constipation, diarrhea, nausea and vomiting.   Endocrine: Negative for cold intolerance and heat intolerance.   Genitourinary:  Negative for decreased urine volume, difficulty urinating, dysuria, flank pain, frequency, hematuria and urgency.   Musculoskeletal:  Negative for arthralgias, back pain, myalgias and neck pain.   Skin:  Positive for color change and wound. Negative for rash.   Allergic/Immunologic: Negative for environmental allergies, food allergies and immunocompromised state.   Neurological:  Negative for dizziness, facial asymmetry, weakness, light-headedness, numbness and headaches.   Hematological:  Negative for adenopathy. Does not bruise/bleed easily.   Psychiatric/Behavioral:  Negative for agitation, behavioral problems and confusion.    Objective:     Vital Signs (Most Recent):  Temp: 98.3 °F (36.8 °C) (10/10/22 1211)  Pulse: 70 (10/10/22 1211)  Resp: 16 (10/10/22 1211)  BP: (!) 155/68 (10/10/22 1211)  SpO2: 99 % (10/10/22 1211)   Vital Signs (24h Range):  Temp:  [97 °F (36.1 °C)-98.5 °F (36.9 °C)] 98.3 °F (36.8 °C)  Pulse:  [61-71] 70  Resp:  [16-20] 16  SpO2:  [94 %-99 %] 99 %  BP: (117-155)/(54-78) 155/68     Weight: 73.5 kg (162 lb)  Body mass index is 26.96 kg/m².    Estimated Creatinine Clearance: 31.3 mL/min (based on SCr of 1.2 mg/dL).    Physical Exam  Constitutional:       General: She is not in acute distress.     Appearance: Normal appearance. She is well-developed. She is not ill-appearing, toxic-appearing or diaphoretic.       HENT:      Head: Normocephalic and atraumatic.   Cardiovascular:      Rate and Rhythm: Normal rate and regular rhythm.      Heart sounds: Normal heart sounds. No murmur heard.    No friction rub. No gallop.   Pulmonary:      Effort: Pulmonary effort is normal.  No respiratory distress.      Breath sounds: Normal breath sounds. No wheezing or rales.   Abdominal:      General: Bowel sounds are normal. There is no distension.      Palpations: Abdomen is soft. There is no mass.      Tenderness: There is no abdominal tenderness. There is no guarding or rebound.   Musculoskeletal:      Right lower leg: Edema (mild) present.      Left lower leg: No edema.   Skin:     General: Skin is warm and dry.   Neurological:      Mental Status: She is alert and oriented to person, place, and time.   Psychiatric:         Behavior: Behavior normal.   10/10      10/7      9/25            Significant Labs: Blood Culture: No results for input(s): LABBLOO in the last 4320 hours.  CBC:   Recent Labs   Lab 10/09/22  0739 10/10/22  0324   WBC 3.45* 3.13*   HGB 10.1* 9.6*   HCT 31.1* 29.7*   * 140*     CMP:   Recent Labs   Lab 10/09/22  0738 10/10/22  0324    136   K 4.2 4.3    107   CO2 21* 19*   GLU 99 93   BUN 35* 38*   CREATININE 1.3 1.2   CALCIUM 8.9 8.8   PROT 5.9* 5.5*   ALBUMIN 3.4* 3.2*   BILITOT 0.7 0.4   ALKPHOS 103 95   AST 13 12   ALT 9* 9*   ANIONGAP 10 10     Wound Culture: No results for input(s): LABAERO in the last 4320 hours.  All pertinent labs within the past 24 hours have been reviewed.    Significant Imaging: I have reviewed all pertinent imaging results/findings within the past 24 hours.  X-Ray Tibia Fibula 2 View Right [[id number]] Resulted: 10/07/22 1737   Order Status: Completed Updated: 10/07/22 1740   Narrative:     EXAMINATION:   XR TIBIA FIBULA 2 VIEW RIGHT     CLINICAL HISTORY:   Unspecified open wound, right lower leg, subsequent encounter     TECHNIQUE:   AP and lateral views of the right tibia and fibula were performed.     COMPARISON:   01/01/2020.     FINDINGS:   The bone mineralization is within normal limits.  There is no cortical step-off.  There is no evidence of periostitis.     The joint spaces are maintained.  There are vascular  calcifications.  The soft tissues are otherwise unremarkable.     There is no evidence of a fracture or dislocation of the right tibia/fibula.    Impression:       No acute process.       Electronically signed by: Austin Us MD   Date: 10/07/2022   Time: 17:37     Imaging History    2022    Date Procedure Name Study Review Link PACS Link Status Accession Number Location   10/07/22 05:16 PM X-Ray Tibia Fibula 2 View Right Study Review  Images Final [id number] [facility]   09/25/22 11:18 PM US Lower Extremity Veins Right Study Review  Images Final [id number] [facility]   09/25/22 09:29 PM X-Ray Chest AP Portable Study Review  Images Final [id number] [facility]   09/20/22 08:38 AM Mammo Digital Screening Bilat w/ Odilon Study Review  Images Final [id number] [facility]   10/08/22 12:06 PM Echo Study Review  Images Final [id number] [facility]

## 2022-10-10 NOTE — NURSING
Pt received discharge instructions/education. No concerns verbalized. IV cath discontinued, cath intact, pt tolerated well. Awaiting additional medications and transportation. Safety precautions in place. Will continue to monitor.

## 2022-10-10 NOTE — SUBJECTIVE & OBJECTIVE
"Past Medical History:   Diagnosis Date    Amblyopia of left eye 4/10/2013    Anxiety     Arthritis     Facet arthropathy, Lumbosacral    Cataract     Central retinal vein occlusion of left eye     Depression     Diabetic polyneuropathy 2022    Exotropia of both eyes 2013    recession RSR 5.0mm w/ adj; recession LR os 5.0 w/ adj; resect MR os  4.0mm    Hearing loss     History of resection of small bowel     Hypertensive retinopathy of both eyes     Hypoglycemia     Macular degeneration     OA (osteoarthritis) of shoulder     Right    Osteoporosis     Posterior vitreous detachment of both eyes     Rhinitis     TIA (transient ischemic attack)        Past Surgical History:   Procedure Laterality Date    APPENDECTOMY      CARDIAC CATHETERIZATION      CATARACT EXTRACTION W/  INTRAOCULAR LENS IMPLANT Bilateral      SECTION, CLASSIC      CLOSURE OF LEFT ATRIAL APPENDAGE USING DEVICE N/A 2020    Procedure: Left atrial appendage closure device;  Surgeon: Abundio Curtis MD;  Location: [facility] CATH LAB;  Service: Cardiology;  Laterality: N/A;    HYSTERECTOMY      INNER EAR SURGERY      JOINT REPLACEMENT      LEFT KNEE REPLACEMENT IN  -    OOPHORECTOMY      SINUS SURGERY      STRABISMUS SURGERY  13    RSR recession 5 mm, LLR recession 5 mm and LMR resection 4mm    STRABISMUS SURGERY  2014    recess LR OD 6mm    TONSILLECTOMY      watchman surgery N/A 2020       Review of patient's allergies indicates:   Allergen Reactions    Opioids - morphine analogues Other (See Comments)     Bowel issues; bowel obstruction    Tizanidine Other (See Comments)     "Lips were numb,  Almost passed out."    Tramadol Hallucinations    Beta-blockers (beta-adrenergic blocking agts) Other (See Comments)     Can not go on beta blockers for long period of time - due to taking allergy injections    Morphine     Opioids-meperidine and related        Medications:  Medications Prior to Admission   Medication Sig    " furosemide (LASIX) 20 MG tablet Take 1 tablet (20 mg total) by mouth 2 (two) times daily before meals.    pravastatin (PRAVACHOL) 40 MG tablet Take 1 tablet (40 mg total) by mouth once daily.    spironolactone (ALDACTONE) 25 MG tablet Take 1 tablet (25 mg total) by mouth 2 (two) times daily before meals.    [DISCONTINUED] aspirin (ECOTRIN) 81 MG EC tablet Take 1 tablet (81 mg total) by mouth once daily.    azelastine (ASTELIN) 137 mcg (0.1 %) nasal spray 1 spray (137 mcg total) by Nasal route 2 (two) times daily.    cholecalciferol, vitamin D3, (VITAMIN D3 ORAL) Take 1 tablet by mouth once daily.    fluticasone propionate (FLONASE) 50 mcg/actuation nasal spray USE 1 SPRAY IN EACH NOSTRIL ONE TIME DAILY    psyllium 0.52 gram capsule Take 9 g by mouth once daily. Pt takes 3 capsules 3 times a day    vit C/E/Zn/coppr/lutein/zeaxan (OCUVITE LUTEIN AND ZEAXANTHIN ORAL) Take 1 capsule by mouth once daily. Lutien 25 mg, Zeaxanthin 5 mg    vitamin E 400 UNIT capsule Take 400 Units by mouth once daily.    [DISCONTINUED] diclofenac sodium (VOLTAREN) 1 % Gel Apply 2 g topically 4 (four) times daily. Use gloves to apply for 10 days    [DISCONTINUED] mupirocin (BACTROBAN) 2 % ointment Apply topically 3 (three) times daily. To right leg wound     Antibiotics (From admission, onward)      Start     Stop Route Frequency Ordered    10/10/22 0900  cefpodoxime tablet 200 mg         -- Oral Daily 10/09/22 1710    10/09/22 2100  doxycycline tablet 100 mg         -- Oral 2 times daily 10/09/22 1710          Antifungals (From admission, onward)      Start     Stop Route Frequency Ordered    10/11/22 0900  miconazole nitrate 2% ointment         -- Top Every other day 10/10/22 1259          Antivirals (From admission, onward)      None             Immunization History   Administered Date(s) Administered    COVID-19, MRNA, LN-S, PF (Pfizer) (Gray Cap) 05/01/2022    COVID-19, MRNA, LN-S, PF (Pfizer) (Purple Cap) 01/14/2021, 02/05/2021,  2021    Influenza (FLUAD) - Quadrivalent - Adjuvanted - PF *Preferred* (65+) 2020, 2022    Influenza - High Dose - PF (65 years and older) 2012, 2013, 10/08/2014, 2015, 10/01/2016, 2017, 2018    Influenza - Quadrivalent 2019, 2019    Influenza - Quadrivalent - High Dose - PF (65 years and older) 2021    Influenza - Trivalent (ADULT) 10/20/2006, 10/03/2007, 10/06/2008, 2009, 2010, 2011    Influenza A (H1N1) 2009 Monovalent - IM - PF 2010    Pneumococcal Conjugate - 13 Valent 2015    Pneumococcal Polysaccharide - 23 Valent 10/20/2001, 10/04/2010    Tdap 2018    Zoster Recombinant 2020, 2020       Family History       Problem Relation (Age of Onset)    Breast cancer Maternal Aunt    Diabetes Sister, Brother    Heart disease Sister, Sister    Hypertension Mother, Father    Liver disease Sister    No Known Problems Maternal Uncle, Paternal Aunt, Paternal Uncle, Maternal Grandmother, Maternal Grandfather, Paternal Grandmother, Paternal Grandfather, Daughter, Son, Son, Son          Social History     Socioeconomic History    Marital status:    Occupational History    Occupation: retired   Tobacco Use    Smoking status: Former     Types: Cigarettes     Quit date: 10/29/1982     Years since quittin.9    Smokeless tobacco: Never   Substance and Sexual Activity    Alcohol use: Yes     Alcohol/week: 2.0 standard drinks     Types: 2 Shots of liquor per week     Comment: rare    Drug use: No    Sexual activity: Never   Other Topics Concern    Are you pregnant or think you may be? No    Breast-feeding No     Social Determinants of Health     Financial Resource Strain: Low Risk     Difficulty of Paying Living Expenses: Not hard at all   Food Insecurity: No Food Insecurity    Worried About Running Out of Food in the Last Year: Never true    Ran Out of Food in the Last Year: Never true   Transportation Needs:  No Transportation Needs    Lack of Transportation (Medical): No    Lack of Transportation (Non-Medical): No   Physical Activity: Inactive    Days of Exercise per Week: 0 days    Minutes of Exercise per Session: 0 min   Stress: Stress Concern Present    Feeling of Stress : To some extent   Social Connections: Socially Isolated    Frequency of Communication with Friends and Family: Once a week    Frequency of Social Gatherings with Friends and Family: Once a week    Attends Hoahaoism Services: Never    Active Member of Clubs or Organizations: No    Attends Club or Organization Meetings: Never    Marital Status:    Housing Stability: Unknown    Unable to Pay for Housing in the Last Year: No    Unstable Housing in the Last Year: No     Review of Systems   Constitutional:  Negative for appetite change, chills, diaphoresis, fatigue, fever and unexpected weight change.   HENT:  Negative for congestion, ear pain, hearing loss, sore throat and tinnitus.    Eyes:  Negative for pain, redness and visual disturbance.   Respiratory:  Negative for cough, chest tightness, shortness of breath and wheezing.    Cardiovascular:  Negative for chest pain.   Gastrointestinal:  Negative for abdominal pain, constipation, diarrhea, nausea and vomiting.   Endocrine: Negative for cold intolerance and heat intolerance.   Genitourinary:  Negative for decreased urine volume, difficulty urinating, dysuria, flank pain, frequency, hematuria and urgency.   Musculoskeletal:  Negative for arthralgias, back pain, myalgias and neck pain.   Skin:  Positive for color change and wound. Negative for rash.   Allergic/Immunologic: Negative for environmental allergies, food allergies and immunocompromised state.   Neurological:  Negative for dizziness, facial asymmetry, weakness, light-headedness, numbness and headaches.   Hematological:  Negative for adenopathy. Does not bruise/bleed easily.   Psychiatric/Behavioral:  Negative for agitation, behavioral  problems and confusion.    Objective:     Vital Signs (Most Recent):  Temp: 98.3 °F (36.8 °C) (10/10/22 1211)  Pulse: 70 (10/10/22 1211)  Resp: 16 (10/10/22 1211)  BP: (!) 155/68 (10/10/22 1211)  SpO2: 99 % (10/10/22 1211)   Vital Signs (24h Range):  Temp:  [97 °F (36.1 °C)-98.5 °F (36.9 °C)] 98.3 °F (36.8 °C)  Pulse:  [61-71] 70  Resp:  [16-20] 16  SpO2:  [94 %-99 %] 99 %  BP: (117-155)/(54-78) 155/68     Weight: 73.5 kg (162 lb)  Body mass index is 26.96 kg/m².    Estimated Creatinine Clearance: 31.3 mL/min (based on SCr of 1.2 mg/dL).    Physical Exam  Constitutional:       General: She is not in acute distress.     Appearance: Normal appearance. She is well-developed. She is not ill-appearing, toxic-appearing or diaphoretic.       HENT:      Head: Normocephalic and atraumatic.   Cardiovascular:      Rate and Rhythm: Normal rate and regular rhythm.      Heart sounds: Normal heart sounds. No murmur heard.    No friction rub. No gallop.   Pulmonary:      Effort: Pulmonary effort is normal. No respiratory distress.      Breath sounds: Normal breath sounds. No wheezing or rales.   Abdominal:      General: Bowel sounds are normal. There is no distension.      Palpations: Abdomen is soft. There is no mass.      Tenderness: There is no abdominal tenderness. There is no guarding or rebound.   Musculoskeletal:      Right lower leg: Edema (mild) present.      Left lower leg: No edema.   Skin:     General: Skin is warm and dry.   Neurological:      Mental Status: She is alert and oriented to person, place, and time.   Psychiatric:         Behavior: Behavior normal.   10/10      10/7      9/25            Significant Labs: Blood Culture: No results for input(s): LABBLOO in the last 4320 hours.  CBC:   Recent Labs   Lab 10/09/22  0739 10/10/22  0324   WBC 3.45* 3.13*   HGB 10.1* 9.6*   HCT 31.1* 29.7*   * 140*     CMP:   Recent Labs   Lab 10/09/22  0738 10/10/22  0324    136   K 4.2 4.3    107   CO2 21* 19*    GLU 99 93   BUN 35* 38*   CREATININE 1.3 1.2   CALCIUM 8.9 8.8   PROT 5.9* 5.5*   ALBUMIN 3.4* 3.2*   BILITOT 0.7 0.4   ALKPHOS 103 95   AST 13 12   ALT 9* 9*   ANIONGAP 10 10     Wound Culture: No results for input(s): LABAERO in the last 4320 hours.  All pertinent labs within the past 24 hours have been reviewed.    Significant Imaging: I have reviewed all pertinent imaging results/findings within the past 24 hours.  X-Ray Tibia Fibula 2 View Right [[id number]] Resulted: 10/07/22 1737   Order Status: Completed Updated: 10/07/22 1740   Narrative:     EXAMINATION:   XR TIBIA FIBULA 2 VIEW RIGHT     CLINICAL HISTORY:   Unspecified open wound, right lower leg, subsequent encounter     TECHNIQUE:   AP and lateral views of the right tibia and fibula were performed.     COMPARISON:   01/01/2020.     FINDINGS:   The bone mineralization is within normal limits.  There is no cortical step-off.  There is no evidence of periostitis.     The joint spaces are maintained.  There are vascular calcifications.  The soft tissues are otherwise unremarkable.     There is no evidence of a fracture or dislocation of the right tibia/fibula.    Impression:       No acute process.       Electronically signed by: Austin Us MD   Date: 10/07/2022   Time: 17:37     Imaging History    2022    Date Procedure Name Study Review Link PACS Link Status Accession Number Location   10/07/22 05:16 PM X-Ray Tibia Fibula 2 View Right Study Review  Images Final [id number] [facility]   09/25/22 11:18 PM US Lower Extremity Veins Right Study Review  Images Final [id number] [facility]   09/25/22 09:29 PM X-Ray Chest AP Portable Study Review  Images Final [id number] [facility]   09/20/22 08:38 AM Mammo Digital Screening Bilat w/ Odilon Study Review  Images Final [id number] [facility]   10/08/22 12:06 PM Echo Study Review  Images Final [id number] [facility]

## 2022-10-11 ENCOUNTER — PATIENT MESSAGE (OUTPATIENT)
Dept: ADMINISTRATIVE | Facility: OTHER | Age: 87
End: 2022-10-11
Payer: MEDICARE

## 2022-10-11 ENCOUNTER — OUTPATIENT CASE MANAGEMENT (OUTPATIENT)
Dept: ADMINISTRATIVE | Facility: OTHER | Age: 87
End: 2022-10-11
Payer: MEDICARE

## 2022-10-11 NOTE — PLAN OF CARE
[facility] - Med Surg  Discharge Final Note    Primary Care Provider: Rubi Young DO    Expected Discharge Date: 10/10/2022    Final Discharge Note (most recent)       Final Note - 10/11/22 0814          Final Note    Assessment Type Final Discharge Note     Anticipated Discharge Disposition Home or Self Care     Hospital Resources/Appts/Education Provided Provided patient/caregiver with written discharge plan information;Appointments scheduled and added to AVS        Post-Acute Status    Discharge Delays None known at this time                     Important Message from Medicare             Contact Info       Rubi Young DO   Specialty: Internal Medicine   Relationship: PCP - General    [address] [facility] [address]   Phone: [phone]       Next Steps: Go on 10/20/2022    Instructions: at 10am on oct 20. thank you    allergy clinic    ph: [phone]       Next Steps: Call    Instructions: Unable to make your allergy appointment.  They will call you to make your appointment.  you can call the above number to follow-up.   thanks          Pt went home with no needs.  Follow-up appt was made.  W/c van transport used for discharge.     DCP: tania Nroth RN [facility] [phone]

## 2022-10-11 NOTE — LETTER
October 14, 2022           Dear Ms Jenniffer,     Welcome to [facility]s Complex Care Management Program.  It was a pleasure talking with you today.  My name is Lima Washington. I look forward to working with you as your .  My goal is to help you function at the healthiest and highest level possible.  You can contact me directly at [phone].    As an [facility] patient with [employer], some of the services we may be able to provide include:      Development of an individualized care plan with a Registered Nurse    Connection with a    Connection with available resources and services     Coordinate communication among your care team members    Provide coaching and education    Help you understand your doctors treatment plan   Help you obtain information about your insurance coverage.     All services provided by [facility]s Complex Care Managers and other care team members are coordinated with and communicated to your primary care team.    As part of your enrollment, you will be receiving education materials and more information about these services in your [facility] account, by phone or through the mail.  If you do not wish to participate or receive information, please contact our office at [phone].      Sincerely,        Lima Washington, RN  [facility]   Out-patient RN Complex Care Manager

## 2022-10-14 ENCOUNTER — OUTPATIENT CASE MANAGEMENT (OUTPATIENT)
Dept: ADMINISTRATIVE | Facility: OTHER | Age: 87
End: 2022-10-14
Payer: MEDICARE

## 2022-10-14 NOTE — PROGRESS NOTES
Outpatient Care Management   - High Risk Patient Assessment    Patient: Jenniffer Jimenez  MRN:  [id number]  Date of Service:  10/14/2022  Completed by:  Suzy Morales LMSW  Referral Date: 10/10/2022    Reason for Visit   Patient presents with    [facility] Chart Review    Social Work Assessment - High Risk     10/14/2022    Plan Of Care     10/14/22       Brief Summary:  received a referral from OPCM RN Rona Washington for the following patient identified psycho-social needs: home health set up. MARICRUZ completed social assessment with pt via telephone. Pt was discharged from the hospital on 10/10/22 with home health orders placed for wound care, but referral not sent out for set up. Pt had [facility] in the past. SW call [facility] liaison and they were able to schedule patient for Roberto 10/ 16/2022. MARICRUZ informed pt of this and she was appreciative. Pt lives with her cat in [facility] while her home is being renovated. Her daughter and granddaughter live nearby. Pt gets a lot of support from apartment staff, but is independent with ADLs. Pt utilized [employer] transportation and stated she is very happy with their services. Pt currently receives Meals on Wheels. She did not report any other needs/concerns. MARICRUZ will follow up with Pt on Monday to make sure home health came by. Care plan was created in collaboration with patient/caregiver input.     Patient Summary     [facility] Social Work Assessment (High Risk)    Involvement of Care  Do I have permission to speak with other family members about your care?: No  Assessment completed by: Patient  Cognitive  Which of the following can you state?: Name, Address, Date of birth, Year, President  Cognitive barriers?: Yes  General  Marital status:   Current employment status: Retired and not working  Support  Level of Caregiver support: Member independent and does not need caregiver assistance  Support system: Neighbors  Is the caregiver reporting burnout?:  No  Support Barriers?: No  Advanced Care Planning  Do you have any of the following?: Living will  If yes, do we have a copy?: Yes  If no, would you like Advance Directive resources?: N/A  Advance Care Planning resources provided?: No  Is Advance Care Planning an area of need?: No  Financial  Current medical coverage: Medicare Advantage  Have you applied for government assistance programs?: No  Are you unable to pay any of the following?: None  Gross monthly income:  (Comment: enough to cover bills)  Financial Support Barriers?: No  Safety  Safety barriers?: No   History  Do you or your spouse currently or formerly serve in the ?: No  Disaster Plan  Established evacuation plan?: Yes  Evacuation location: go with daughter  Mental Health Status  Emotional status: Stable  Have you recenetly lost a loved one?: No  Current mental health treatment: No  Would you like mental health resources?: No  Current symptoms: None  Mental/Behavioral/Environmental risk: None  Mental Health Barriers?: No  Addictive Behaviors  Current alcohol consumption?: No  Current substance abuse?: No  Gambling habits?: No  Was the PHQ depression screening completed?: No  Was the YING-7 completed?: No         Complex Care Plan     Care plan was discussed and completed today with input from patient and/or caregiver.    Patient Instructions     Follow up in about 3 days (around 10/17/2022).    Todays OPCM Self-Management Care Plan was developed with the patients/caregivers input and was based on identified barriers from todays assessment.  Goals were written today with the patient/caregiver and the patient has agreed to work towards these goals to improve his/her overall well-being. Patient verbalized understanding of the care plan, goals, and all of today's instructions. Encouraged patient/caregiver to communicate with his/her physician and health care team about health conditions and the treatment plan.  Provided my contact  information today and encouraged patient/caregiver to call me with any questions as needed.

## 2022-10-14 NOTE — PROGRESS NOTES
Outpatient Care Management  Initial Patient Assessment    Patient: Jenniffer Jimenez  MRN: [id number]  Date of Service: 10/11/2022  Completed by: Lima Washington RN  Referral Date: 10/10/2022  Program: High Risk  Status: Ongoing  Effective Dates: 10/14/2022 - present  Responsible Staff: Lima Washington RN      Reason for Visit   Patient presents with    OPCM Chart Review     10/11/2022    OPCM RN First Assessment Attempt     10/11/2022  No answer, CM left voicemail message on cell phone, home phone mailbox is full.  CM also sent assessment attempt letter through the portal.    OPCM RN Second Assessment Attempt     10/12/2022  No answer, CM left voicemail message on cell phone, home phone mailbox is full.    Nursing Assessment     10/14/2022    OPCM Enrollment Call     10/14/2022    Plan Of Care     10/14/2022    Initial Assessment     10/14/2022       Brief Summary:  Jenniffer Jimenez was referred by Dr. Chito Jade for heart failure. Patient qualifies for program based on risk score of 66.7%.   Active problem list, medical, surgical and social history reviewed. Active comorbidities include HTN, HLD, AFib, CHF, CKD3, hypoglycemia. Areas of need identified by patient include CHF.   Complex care plan created with patient/caregiver input. By next encounter, patient agrees to follow up in one week, to weigh herself daily and to read educational materials.     Next steps:   Follow up in one week  Follow up if received educational materials  Follow up if daily weights  Educate on 3 lb/5 lb rule  Review daily weight trends  Educate on S&S of CHF  Educate on low sodium diet    Follow up in about 13 days (around 10/24/2022).    Assessment Documentation     OPCM Initial Assessment    Involvement of Care  Do I have permission to speak with other family members about your care?: Yes  Assessment completed by: Patient  Identified Areas of Need  Advanced Care Planning: No  Housing: no  Medication Adherence: No  *Active  medication list was reviewed and reconciled with patient and/or caregiver:   Nutrition: no  Lab Adherence: no  Depression: No  Cognitive/Behavioral Health: no  Communication: no  Health Literacy: Yes  Fall risk?: No  Equipment/Supplies/Services: no          Problem List and History     Problems Addressed This Visit    Atrial Fibrillation: Not identified by patient as current problem  Heart Failure: Identified as current problem  Hypertension: Not identified by patient as current problem  Diabetes: Not identified by patient as current problem  Stroke: Not identified by patient as current problem  Anemia: Not identified by patient as current problem  Osteoporosis: Not identified by patient as current problem  Chronic Kidney Disease: Not identified by patient as current problem  Hyperlipidemia: Not identified by patient as current problem  Heart Disease: Not identified by patient as current problem         Reviewed medical and social history with patient and/or caregiver. A complex care plan was discussed and completed today, with input from patient and/or caregiver.    Patient Instructions     Instructions were provided via the [employer] patient resources and are available for the patient to view on the patient portal, if active.    Todays OPCM Self-Management Care Plan was developed with the patients/caregivers input and was based on identified barriers from todays assessment.  Goals were written today with the patient/caregiver and the patient has agreed to work towards these goals to improve his/her overall well-being. Patient verbalized understanding of the care plan, goals, and all of today's instructions. Encouraged patient/caregiver to communicate with his/her physician and health care team about health conditions and the treatment plan.  Provided my contact information today and encouraged patient/caregiver to call me with any questions as needed.

## 2022-10-17 ENCOUNTER — OUTPATIENT CASE MANAGEMENT (OUTPATIENT)
Dept: ADMINISTRATIVE | Facility: OTHER | Age: 87
End: 2022-10-17
Payer: MEDICARE

## 2022-10-17 NOTE — PROGRESS NOTES
Outpatient Care Management   - Care Plan Follow Up    Patient: Jenniffer Jimenez  MRN:  [id number]  Date of Service:  10/17/2022  Completed by:  Suzy Morales LMSW  Referral Date: 10/10/2022    Reason for Visit   Patient presents with    Update Plan Of Care    Case Closure     10/17/2022       Brief Summary: Sw spoke with pt for follow up via telephone. She reported home health did come out yesterday and admitted pt. Nurse was able to change her wound dressing and PT will be coming out soon also. Pt reported no other needs or issues. Pt has a follow up with MD on Thursday. SW discussed with pt closing case and she was in agreement. SW will close case and notify OPCM RN.     Complex Care Plan     Care plan was discussed and completed today with input from patient and/or caregiver.    Patient Instructions     No follow-ups on file.    Todays OPCM Self-Management Care Plan was developed with the patients/caregivers input and was based on identified barriers from todays assessment.  Goals were written today with the patient/caregiver and the patient has agreed to work towards these goals to improve his/her overall well-being. Patient verbalized understanding of the care plan, goals, and all of today's instructions. Encouraged patient/caregiver to communicate with his/her physician and health care team about health conditions and the treatment plan.  Provided my contact information today and encouraged patient/caregiver to call me with any questions as needed.

## 2022-10-18 ENCOUNTER — LAB VISIT (OUTPATIENT)
Dept: LAB | Facility: HOSPITAL | Age: 87
End: 2022-10-18
Attending: INTERNAL MEDICINE
Payer: MEDICARE

## 2022-10-18 DIAGNOSIS — I50.32 CHRONIC DIASTOLIC HEART FAILURE: Primary | ICD-10-CM

## 2022-10-18 LAB
ALBUMIN SERPL BCP-MCNC: 3.8 G/DL (ref 3.5–5.2)
ALP SERPL-CCNC: 140 U/L (ref 55–135)
ALT SERPL W/O P-5'-P-CCNC: 15 U/L (ref 10–44)
ANION GAP SERPL CALC-SCNC: 10 MMOL/L (ref 8–16)
AST SERPL-CCNC: 21 U/L (ref 10–40)
BILIRUB SERPL-MCNC: 0.5 MG/DL (ref 0.1–1)
BUN SERPL-MCNC: 34 MG/DL (ref 8–23)
CALCIUM SERPL-MCNC: 9.2 MG/DL (ref 8.7–10.5)
CHLORIDE SERPL-SCNC: 108 MMOL/L (ref 95–110)
CO2 SERPL-SCNC: 22 MMOL/L (ref 23–29)
CREAT SERPL-MCNC: 1.3 MG/DL (ref 0.5–1.4)
ERYTHROCYTE [DISTWIDTH] IN BLOOD BY AUTOMATED COUNT: 15.7 % (ref 11.5–14.5)
EST. GFR  (NO RACE VARIABLE): 39.1 ML/MIN/1.73 M^2
GLUCOSE SERPL-MCNC: 66 MG/DL (ref 70–110)
HCT VFR BLD AUTO: 33.2 % (ref 37–48.5)
HGB BLD-MCNC: 11 G/DL (ref 12–16)
MCH RBC QN AUTO: 29.4 PG (ref 27–31)
MCHC RBC AUTO-ENTMCNC: 33.1 G/DL (ref 32–36)
MCV RBC AUTO: 89 FL (ref 82–98)
PLATELET # BLD AUTO: 202 K/UL (ref 150–450)
PMV BLD AUTO: 11.2 FL (ref 9.2–12.9)
POTASSIUM SERPL-SCNC: 4.4 MMOL/L (ref 3.5–5.1)
PROT SERPL-MCNC: 7 G/DL (ref 6–8.4)
RBC # BLD AUTO: 3.74 M/UL (ref 4–5.4)
SODIUM SERPL-SCNC: 140 MMOL/L (ref 136–145)
WBC # BLD AUTO: 4.4 K/UL (ref 3.9–12.7)

## 2022-10-18 PROCEDURE — 85027 COMPLETE CBC AUTOMATED: CPT | Performed by: INTERNAL MEDICINE

## 2022-10-18 PROCEDURE — 80053 COMPREHEN METABOLIC PANEL: CPT | Performed by: INTERNAL MEDICINE

## 2022-10-20 ENCOUNTER — OFFICE VISIT (OUTPATIENT)
Dept: INFECTIOUS DISEASES | Facility: CLINIC | Age: 87
End: 2022-10-20
Payer: MEDICARE

## 2022-10-20 VITALS
SYSTOLIC BLOOD PRESSURE: 128 MMHG | DIASTOLIC BLOOD PRESSURE: 68 MMHG | HEART RATE: 83 BPM | TEMPERATURE: 98 F | WEIGHT: 171.31 LBS | BODY MASS INDEX: 28.51 KG/M2

## 2022-10-20 DIAGNOSIS — L03.119 CELLULITIS AND ABSCESS OF LOWER EXTREMITY: Primary | ICD-10-CM

## 2022-10-20 DIAGNOSIS — L02.419 CELLULITIS AND ABSCESS OF LOWER EXTREMITY: Primary | ICD-10-CM

## 2022-10-20 DIAGNOSIS — L03.115 CELLULITIS OF RIGHT LEG: ICD-10-CM

## 2022-10-20 DIAGNOSIS — I87.2 VENOUS INSUFFICIENCY: ICD-10-CM

## 2022-10-20 PROCEDURE — 1101F PR PT FALLS ASSESS DOC 0-1 FALLS W/OUT INJ PAST YR: ICD-10-PCS | Mod: CPTII,S$GLB,, | Performed by: PHYSICIAN ASSISTANT

## 2022-10-20 PROCEDURE — 1126F PR PAIN SEVERITY QUANTIFIED, NO PAIN PRESENT: ICD-10-PCS | Mod: CPTII,S$GLB,, | Performed by: PHYSICIAN ASSISTANT

## 2022-10-20 PROCEDURE — 99214 PR OFFICE/OUTPT VISIT, EST, LEVL IV, 30-39 MIN: ICD-10-PCS | Mod: S$GLB,,, | Performed by: PHYSICIAN ASSISTANT

## 2022-10-20 PROCEDURE — 1101F PT FALLS ASSESS-DOCD LE1/YR: CPT | Mod: CPTII,S$GLB,, | Performed by: PHYSICIAN ASSISTANT

## 2022-10-20 PROCEDURE — 3288F PR FALLS RISK ASSESSMENT DOCUMENTED: ICD-10-PCS | Mod: CPTII,S$GLB,, | Performed by: PHYSICIAN ASSISTANT

## 2022-10-20 PROCEDURE — 99214 OFFICE O/P EST MOD 30 MIN: CPT | Mod: S$GLB,,, | Performed by: PHYSICIAN ASSISTANT

## 2022-10-20 PROCEDURE — 99999 PR PBB SHADOW E&M-EST. PATIENT-LVL III: CPT | Mod: PBBFAC,,, | Performed by: PHYSICIAN ASSISTANT

## 2022-10-20 PROCEDURE — 1126F AMNT PAIN NOTED NONE PRSNT: CPT | Mod: CPTII,S$GLB,, | Performed by: PHYSICIAN ASSISTANT

## 2022-10-20 PROCEDURE — 3288F FALL RISK ASSESSMENT DOCD: CPT | Mod: CPTII,S$GLB,, | Performed by: PHYSICIAN ASSISTANT

## 2022-10-20 PROCEDURE — 99999 PR PBB SHADOW E&M-EST. PATIENT-LVL III: ICD-10-PCS | Mod: PBBFAC,,, | Performed by: PHYSICIAN ASSISTANT

## 2022-10-20 RX ORDER — CEFDINIR 300 MG/1
300 CAPSULE ORAL EVERY 12 HOURS
Qty: 14 CAPSULE | Refills: 0 | Status: SHIPPED | OUTPATIENT
Start: 2022-10-20 | End: 2022-10-27

## 2022-10-20 NOTE — PROGRESS NOTES
Subjective:       Patient ID: Jenniffer Jimenez is a 89 y.o. female.    Chief Complaint: Follow-up    HPI    89 yo female with CHF, venous insufficiency BLE presents to ID clinic for hospital follow up. Pt was recently admitted for persistent RLE cellulitis. She was discharged on doxycycline and cefpodoxome x 14 days. She is on day 10/14 of abx therapy. No fever chills or night sweats. Has wound care every other day .  she continues to have leg swelling and redness. +volume overload.     Review of Systems   Constitutional:  Negative for fatigue and fever.   Respiratory:  Negative for cough and shortness of breath.    Cardiovascular:  Positive for leg swelling.   Genitourinary:  Negative for dysuria.   Integumentary:  Positive for wound.   Neurological:  Negative for dizziness.   All other systems reviewed and are negative.      Objective:      Physical Exam  Vitals and nursing note reviewed.   Constitutional:       General: She is not in acute distress.     Appearance: She is well-developed. She is not diaphoretic.   HENT:      Head: Normocephalic and atraumatic.   Eyes:      Pupils: Pupils are equal, round, and reactive to light.   Cardiovascular:      Rate and Rhythm: Normal rate and regular rhythm.      Heart sounds: Normal heart sounds. No murmur heard.    No friction rub. No gallop.   Pulmonary:      Effort: Pulmonary effort is normal. No respiratory distress.      Breath sounds: Normal breath sounds. No wheezing or rales.   Chest:      Chest wall: No tenderness.   Abdominal:      General: Bowel sounds are normal. There is no distension.      Palpations: Abdomen is soft. There is no mass.      Tenderness: There is no abdominal tenderness. There is no guarding or rebound.      Hernia: No hernia is present.   Musculoskeletal:         General: No tenderness or deformity. Normal range of motion.      Cervical back: Normal range of motion and neck supple.      Right lower leg: Edema present.   Skin:     General:  Skin is warm and dry.      Coloration: Skin is not pale.      Findings: Erythema present.   Neurological:      Mental Status: She is alert and oriented to person, place, and time.      Cranial Nerves: No cranial nerve deficit.      Coordination: Coordination normal.   Psychiatric:         Behavior: Behavior normal.         Thought Content: Thought content normal.             Assessment:       Problem List Items Addressed This Visit    None  Visit Diagnoses       Cellulitis and abscess of lower extremity    -  Primary    Cellulitis of right leg        Relevant Medications    cefdinir (OMNICEF) 300 MG capsule    Venous insufficiency        Relevant Medications    cefdinir (OMNICEF) 300 MG capsule              Plan:     Pt will need judicial leg elevation ankle>knee>hip.  Would not extend abx at this time and complete oral abx course as instructed. However as pt adamant on continuing medication, will order cefdinir x 7 days to pharmacy as needed if with recurrent cellulitis.   Agree with vascular and PCP follow up   F/u with ID in two weeks       >35 minutes of total time spent on the encounter, which includes face to face time and non-face to face time preparing to see the patient (eg, review of tests), Obtaining and/or reviewing separately obtained history, Documenting clinical information in the electronic or other health record, Independently interpreting results (not separately reported) and communicating results to the patient/family/caregiver, or Care coordination (not separately reported).

## 2022-10-21 PROBLEM — D64.9 NORMOCYTIC NORMOCHROMIC ANEMIA: Status: ACTIVE | Noted: 2022-10-21

## 2022-10-24 ENCOUNTER — PATIENT MESSAGE (OUTPATIENT)
Dept: ADMINISTRATIVE | Facility: OTHER | Age: 87
End: 2022-10-24
Payer: MEDICARE

## 2022-10-24 ENCOUNTER — OFFICE VISIT (OUTPATIENT)
Dept: OPHTHALMOLOGY | Facility: CLINIC | Age: 87
End: 2022-10-24
Payer: MEDICARE

## 2022-10-24 ENCOUNTER — OUTPATIENT CASE MANAGEMENT (OUTPATIENT)
Dept: ADMINISTRATIVE | Facility: OTHER | Age: 87
End: 2022-10-24
Payer: MEDICARE

## 2022-10-24 DIAGNOSIS — H34.8122 STABLE CENTRAL RETINAL VEIN OCCLUSION OF LEFT EYE: ICD-10-CM

## 2022-10-24 DIAGNOSIS — H35.3221 EXUDATIVE AGE-RELATED MACULAR DEGENERATION OF LEFT EYE WITH ACTIVE CHOROIDAL NEOVASCULARIZATION: ICD-10-CM

## 2022-10-24 DIAGNOSIS — H43.813 POSTERIOR VITREOUS DETACHMENT, BILATERAL: ICD-10-CM

## 2022-10-24 DIAGNOSIS — H35.033 HYPERTENSIVE RETINOPATHY OF BOTH EYES: ICD-10-CM

## 2022-10-24 DIAGNOSIS — H52.7 REFRACTIVE ERROR: ICD-10-CM

## 2022-10-24 DIAGNOSIS — H04.123 DRY EYE SYNDROME OF BOTH EYES: Primary | ICD-10-CM

## 2022-10-24 DIAGNOSIS — Z96.1 PSEUDOPHAKIA, BOTH EYES: ICD-10-CM

## 2022-10-24 PROCEDURE — 1159F PR MEDICATION LIST DOCUMENTED IN MEDICAL RECORD: ICD-10-PCS | Mod: CPTII,S$GLB,, | Performed by: OPHTHALMOLOGY

## 2022-10-24 PROCEDURE — 3288F FALL RISK ASSESSMENT DOCD: CPT | Mod: CPTII,S$GLB,, | Performed by: OPHTHALMOLOGY

## 2022-10-24 PROCEDURE — 92014 PR EYE EXAM, EST PATIENT,COMPREHESV: ICD-10-PCS | Mod: S$GLB,,, | Performed by: OPHTHALMOLOGY

## 2022-10-24 PROCEDURE — 1126F AMNT PAIN NOTED NONE PRSNT: CPT | Mod: CPTII,S$GLB,, | Performed by: OPHTHALMOLOGY

## 2022-10-24 PROCEDURE — 99999 PR PBB SHADOW E&M-EST. PATIENT-LVL III: CPT | Mod: PBBFAC,,, | Performed by: OPHTHALMOLOGY

## 2022-10-24 PROCEDURE — 1101F PT FALLS ASSESS-DOCD LE1/YR: CPT | Mod: CPTII,S$GLB,, | Performed by: OPHTHALMOLOGY

## 2022-10-24 PROCEDURE — 92014 COMPRE OPH EXAM EST PT 1/>: CPT | Mod: S$GLB,,, | Performed by: OPHTHALMOLOGY

## 2022-10-24 PROCEDURE — 1160F PR REVIEW ALL MEDS BY PRESCRIBER/CLIN PHARMACIST DOCUMENTED: ICD-10-PCS | Mod: CPTII,S$GLB,, | Performed by: OPHTHALMOLOGY

## 2022-10-24 PROCEDURE — 1159F MED LIST DOCD IN RCRD: CPT | Mod: CPTII,S$GLB,, | Performed by: OPHTHALMOLOGY

## 2022-10-24 PROCEDURE — 1160F RVW MEDS BY RX/DR IN RCRD: CPT | Mod: CPTII,S$GLB,, | Performed by: OPHTHALMOLOGY

## 2022-10-24 PROCEDURE — 1101F PR PT FALLS ASSESS DOC 0-1 FALLS W/OUT INJ PAST YR: ICD-10-PCS | Mod: CPTII,S$GLB,, | Performed by: OPHTHALMOLOGY

## 2022-10-24 PROCEDURE — 99999 PR PBB SHADOW E&M-EST. PATIENT-LVL III: ICD-10-PCS | Mod: PBBFAC,,, | Performed by: OPHTHALMOLOGY

## 2022-10-24 PROCEDURE — 1126F PR PAIN SEVERITY QUANTIFIED, NO PAIN PRESENT: ICD-10-PCS | Mod: CPTII,S$GLB,, | Performed by: OPHTHALMOLOGY

## 2022-10-24 PROCEDURE — 3288F PR FALLS RISK ASSESSMENT DOCUMENTED: ICD-10-PCS | Mod: CPTII,S$GLB,, | Performed by: OPHTHALMOLOGY

## 2022-10-24 NOTE — PROGRESS NOTES
Transitional Care Note:    Family and/or Caretaker present at visit?  No.  Diagnostic tests reviewed/disposition: I have reviewed all completed as well as pending diagnostic tests at the time of discharge.  Disease/illness education: Yes   Home health/community services discussion/referrals:  Has home health   Establishment or re-establishment of referral orders for community resources:  Will establish with Wound care as per plan  .   Discussion with other health care providers:  Will coordinate with ID  .     HPI:    Mrs. Jenniffer Jimenez is a 89-year-old F, new to me but established with a colleague, with extensive prior medical history as below presenting for hospital follow-up:    Hospital follow-up:  -presented with right lower extremity cellulitis and failure of outpatient antibiotics (Augmentin switched to Keflex/doxy with increasing pain/swelling)  -negative venous ultrasound   -treated with IV clinda  -discharged on doxycycline/cefpodoxome x 14 days and found to be in fluid overload  -has already completed hospital follow-up with Infectious Disease 10/20; cefdinir x 7 days added and recommended follow-up in 2 weeks  -4 pounds down in last 5 days on increased lasix without noticeable decline in serious fluid discharge           Review of Systems   Constitutional:  Negative for chills, fever and weight loss.   HENT:  Negative for congestion, hearing loss, sinus pain and sore throat.    Eyes:  Negative for blurred vision.   Respiratory:  Negative for cough, shortness of breath and wheezing.    Cardiovascular:  Negative for chest pain, palpitations and leg swelling.   Gastrointestinal:  Negative for abdominal pain, constipation, diarrhea, nausea and vomiting.   Genitourinary:  Negative for dysuria, flank pain and hematuria.   Skin:  Negative for rash.        Wound    Neurological:  Negative for dizziness, loss of consciousness and headaches.     Vitals:    10/25/22 1120   BP: (!) 114/54   Pulse: 71   Resp: 18    Temp: 97.3 °F (36.3 °C)      Physical Exam  Constitutional:       Appearance: Normal appearance.   HENT:      Head: Normocephalic and atraumatic.   Eyes:      Extraocular Movements: Extraocular movements intact.      Conjunctiva/sclera: Conjunctivae normal.   Cardiovascular:      Rate and Rhythm: Normal rate and regular rhythm.   Pulmonary:      Effort: Pulmonary effort is normal.      Breath sounds: Normal breath sounds.   Skin:     Comments: Lower extremity wound as per photo in HPI    Neurological:      Mental Status: She is alert.     Assessment & Plan      Lower extremity wound:  - It is unclear to me if we are treating a refractory cellulitis, arterial insufficiency, fluid overload, or Pyoderma Gangrenosum (referral generated to Wound care urgently for specialist opinion and further assistance/guidance regarding treatment). At present, the wound does not appear infected nor is it responding to several courses of both PO and IV Ab's; so further ABs seem superfluous, but ED return if symptoms worsen was stressed   - Will assess Procal to further delineate bacterial sources and assess lower extremity arterial sufficiency to ensure Ab's have penetrance     Addendum:   -after conferring with Infectious Disease, I have also added a dermatology referral for consideration of biopsy/pathology diagnosis.

## 2022-10-24 NOTE — PROGRESS NOTES
Outpatient Care Management  Plan of Care Follow Up Visit    Patient: Jenniffer Jimenez  MRN: [id number]  Date of Service: 10/24/2022  Completed by: Lima Washington RN  Referral Date: 10/10/2022    Reason for Visit   Patient presents with    OPCM Chart Review     10/24/2022    OPCM RN First Follow-Up Attempt     10/24/2022  No answer on cell phone, CM left voicemail message.  No answer on home phone, voice mailbox is full.  CM sent follow up attempt letter through the portal.    Follow-up     10/24/2022    Update Plan Of Care     10/24/2022       Brief Summary: Patient states she has been weighing herself everyday.  Today she weighed 165 which was down one pound from yesterday at 166.  She states her weight has been staying right around these numbers.  She received her educational materials and read through them.  Patient states she does not know all the signs and symptoms of CHF except for shortness of breath, weight gain and fatigue.  She states she keeps her legs elevated above her heart whenever she is sitting down and she has no swelling at this time.  She states she only gets slightly SOB on exertion.  Patient states that she is trying to follow the low sodium diet.  She states she eats a lot of fresh vegetables and salads.  She does not eat canned vegetables because of the sodium content.  She sticks to a 1000 ml fluid restriction a day.     CM reviewed with patient she should contact her PCP if she has a 3 lb weight gain in one day or a 5 lb weight gain in one week.  CM informed patient of the signs and symptoms of CHF including SOB; new or worsening cough especially if your cough is bloody or frothy; worsened swelling in your legs or ankles; fast or irregular heartbeat.  CM reviewed a 2920-1153 mg sodium diet with patient.  CM reviewed whether or not patient can read flood nutritional labels, she states she does it everyday.     Next steps:   Follow up in two weeks  Follow up on 3 lb/5 lb rule  Follow up  on S&S of CHF  Follow up on low sodium diet  Follow up on daily weights and trends  Review use of diuretic medication  Educate on 6539-9445 ml fluid restriction instead of 1000 ml (unless from MD)  Educate to avoid processed foods  Educate to keep a food and drink log    Follow up in about 15 days (around 11/8/2022).    Patient Summary     Involvement of Care:  Do I have permission to speak with other family members about your care?  Yes    Patient Reported Labs & Vitals:  1.  Any Patient Reported Labs & Vitals?  Yes  2.  Patient Reported Blood Pressure:  NA  3.  Patient Reported Pulse:  NA  4.  Patient Reported Weight (Kg):  165 lbs  5.  Patient Reported Blood Glucose (mg/dl):  NA    Medical and social history was reviewed with patient and/or caregiver.     Clinical Assessment     Reviewed and provided basic information on available community resources for mental health, transportation, wellness resources, and palliative care programs with patient and/or caregiver.     Complex Care Plan     Care plan was discussed and completed today with input from patient and/or caregiver.    Patient Instructions     Instructions were provided via the [facility] patient resources and are available for the patient to view on the patient portal.    Todays OPCM Self-Management Care Plan was developed with the patients/caregivers input and was based on identified barriers from todays assessment.  Goals were written today with the patient/caregiver and the patient has agreed to work towards these goals to improve his/her overall well-being. Patient verbalized understanding of the care plan, goals, and all of today's instructions. Encouraged patient/caregiver to communicate with his/her physician and health care team about health conditions and the treatment plan.  Provided my contact information today and encouraged patient/caregiver to call me with any questions as needed.

## 2022-10-24 NOTE — PROGRESS NOTES
Subjective:       Patient ID: Jenniffer Jimenez is a 89 y.o. female.    Chief Complaint: Annual Exam (Jenniffer Jimenez is a 89 y/o female )    HPI     Annual Exam     Additional comments: Jenniffer Jimenez is a 89 y/o female            Comments    Pt here for annual eye exam   Pt state no complaints at this time.    POHx:   1. Wet AMD OS   Avastin OS x 5 (12/22/20)     2. PPCNVM       3. Prior CRVO OS   Stable       4. PCIOL OU       5. S/p strab sx       6. HTN Ret OU            Last edited by Basia Jensen on 10/24/2022 10:19 AM.             Assessment:       1. Dry eye syndrome of both eyes    2. 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 eye with active choroidal neovascularization    3. Posterior vitreous detachment, bilateral    4. Stable central retinal vein occlusion of left eye    5. Hypertensive retinopathy of both eyes    6. Refractive error    7. Pseudophakia, both eyes          Plan:       SARAH-Needs AT's.  Wet AMD OS-Followed by Dr Hernandez.  PVD's OU-Stable.  Prior CRVO OS-Stable.  HTN retinopathy OU-Mild, stable OU.  RE-Pt wants MRx.      AT's.  Control HTN.  Give MRx.  RTC 1 yr.

## 2022-10-25 ENCOUNTER — OFFICE VISIT (OUTPATIENT)
Dept: INTERNAL MEDICINE | Facility: CLINIC | Age: 87
End: 2022-10-25
Payer: MEDICARE

## 2022-10-25 ENCOUNTER — LAB VISIT (OUTPATIENT)
Dept: LAB | Facility: HOSPITAL | Age: 87
End: 2022-10-25
Attending: STUDENT IN AN ORGANIZED HEALTH CARE EDUCATION/TRAINING PROGRAM
Payer: MEDICARE

## 2022-10-25 VITALS
OXYGEN SATURATION: 99 % | HEIGHT: 65 IN | DIASTOLIC BLOOD PRESSURE: 54 MMHG | HEART RATE: 71 BPM | RESPIRATION RATE: 18 BRPM | TEMPERATURE: 97 F | WEIGHT: 167.13 LBS | BODY MASS INDEX: 27.85 KG/M2 | SYSTOLIC BLOOD PRESSURE: 114 MMHG

## 2022-10-25 DIAGNOSIS — S81.801S LEG WOUND, RIGHT, SEQUELA: ICD-10-CM

## 2022-10-25 DIAGNOSIS — S81.801S LEG WOUND, RIGHT, SEQUELA: Primary | ICD-10-CM

## 2022-10-25 LAB — PROCALCITONIN SERPL IA-MCNC: 0.04 NG/ML

## 2022-10-25 PROCEDURE — 1125F AMNT PAIN NOTED PAIN PRSNT: CPT | Mod: CPTII,S$GLB,, | Performed by: STUDENT IN AN ORGANIZED HEALTH CARE EDUCATION/TRAINING PROGRAM

## 2022-10-25 PROCEDURE — 99214 PR OFFICE/OUTPT VISIT, EST, LEVL IV, 30-39 MIN: ICD-10-PCS | Mod: S$GLB,,, | Performed by: STUDENT IN AN ORGANIZED HEALTH CARE EDUCATION/TRAINING PROGRAM

## 2022-10-25 PROCEDURE — 36415 COLL VENOUS BLD VENIPUNCTURE: CPT | Mod: PO | Performed by: STUDENT IN AN ORGANIZED HEALTH CARE EDUCATION/TRAINING PROGRAM

## 2022-10-25 PROCEDURE — 99214 OFFICE O/P EST MOD 30 MIN: CPT | Mod: S$GLB,,, | Performed by: STUDENT IN AN ORGANIZED HEALTH CARE EDUCATION/TRAINING PROGRAM

## 2022-10-25 PROCEDURE — 84145 PROCALCITONIN (PCT): CPT | Performed by: STUDENT IN AN ORGANIZED HEALTH CARE EDUCATION/TRAINING PROGRAM

## 2022-10-25 PROCEDURE — 1101F PR PT FALLS ASSESS DOC 0-1 FALLS W/OUT INJ PAST YR: ICD-10-PCS | Mod: CPTII,S$GLB,, | Performed by: STUDENT IN AN ORGANIZED HEALTH CARE EDUCATION/TRAINING PROGRAM

## 2022-10-25 PROCEDURE — 3288F PR FALLS RISK ASSESSMENT DOCUMENTED: ICD-10-PCS | Mod: CPTII,S$GLB,, | Performed by: STUDENT IN AN ORGANIZED HEALTH CARE EDUCATION/TRAINING PROGRAM

## 2022-10-25 PROCEDURE — 1159F PR MEDICATION LIST DOCUMENTED IN MEDICAL RECORD: ICD-10-PCS | Mod: CPTII,S$GLB,, | Performed by: STUDENT IN AN ORGANIZED HEALTH CARE EDUCATION/TRAINING PROGRAM

## 2022-10-25 PROCEDURE — 1125F PR PAIN SEVERITY QUANTIFIED, PAIN PRESENT: ICD-10-PCS | Mod: CPTII,S$GLB,, | Performed by: STUDENT IN AN ORGANIZED HEALTH CARE EDUCATION/TRAINING PROGRAM

## 2022-10-25 PROCEDURE — 1101F PT FALLS ASSESS-DOCD LE1/YR: CPT | Mod: CPTII,S$GLB,, | Performed by: STUDENT IN AN ORGANIZED HEALTH CARE EDUCATION/TRAINING PROGRAM

## 2022-10-25 PROCEDURE — 99999 PR PBB SHADOW E&M-EST. PATIENT-LVL V: ICD-10-PCS | Mod: PBBFAC,,, | Performed by: STUDENT IN AN ORGANIZED HEALTH CARE EDUCATION/TRAINING PROGRAM

## 2022-10-25 PROCEDURE — 3288F FALL RISK ASSESSMENT DOCD: CPT | Mod: CPTII,S$GLB,, | Performed by: STUDENT IN AN ORGANIZED HEALTH CARE EDUCATION/TRAINING PROGRAM

## 2022-10-25 PROCEDURE — 99999 PR PBB SHADOW E&M-EST. PATIENT-LVL V: CPT | Mod: PBBFAC,,, | Performed by: STUDENT IN AN ORGANIZED HEALTH CARE EDUCATION/TRAINING PROGRAM

## 2022-10-25 PROCEDURE — 1159F MED LIST DOCD IN RCRD: CPT | Mod: CPTII,S$GLB,, | Performed by: STUDENT IN AN ORGANIZED HEALTH CARE EDUCATION/TRAINING PROGRAM

## 2022-10-25 NOTE — Clinical Note
I chatted with Infectious Disease, and they would also like her to see Dermatology.  Referral placed.  Can we help facilitate this for her please?  Thank you for your time.

## 2022-10-26 ENCOUNTER — LAB VISIT (OUTPATIENT)
Dept: LAB | Facility: HOSPITAL | Age: 87
End: 2022-10-26
Attending: INTERNAL MEDICINE
Payer: MEDICARE

## 2022-10-26 ENCOUNTER — TELEPHONE (OUTPATIENT)
Dept: INTERNAL MEDICINE | Facility: CLINIC | Age: 87
End: 2022-10-26
Payer: MEDICARE

## 2022-10-26 DIAGNOSIS — S81.801S LEG WOUND, RIGHT, SEQUELA: Primary | ICD-10-CM

## 2022-10-26 DIAGNOSIS — M79.603 PAIN OF UPPER EXTREMITY, UNSPECIFIED LATERALITY: Primary | ICD-10-CM

## 2022-10-26 DIAGNOSIS — L03.115 CELLULITIS OF RIGHT FOOT: Primary | ICD-10-CM

## 2022-10-26 LAB
ALBUMIN SERPL BCP-MCNC: 3.6 G/DL (ref 3.5–5.2)
ALP SERPL-CCNC: 127 U/L (ref 55–135)
ALT SERPL W/O P-5'-P-CCNC: 12 U/L (ref 10–44)
ANION GAP SERPL CALC-SCNC: 11 MMOL/L (ref 8–16)
AST SERPL-CCNC: 18 U/L (ref 10–40)
BASOPHILS # BLD AUTO: 0.03 K/UL (ref 0–0.2)
BASOPHILS NFR BLD: 0.7 % (ref 0–1.9)
BILIRUB SERPL-MCNC: 0.9 MG/DL (ref 0.1–1)
BUN SERPL-MCNC: 38 MG/DL (ref 8–23)
CALCIUM SERPL-MCNC: 9 MG/DL (ref 8.7–10.5)
CHLORIDE SERPL-SCNC: 106 MMOL/L (ref 95–110)
CO2 SERPL-SCNC: 20 MMOL/L (ref 23–29)
CREAT SERPL-MCNC: 1.3 MG/DL (ref 0.5–1.4)
DIFFERENTIAL METHOD: ABNORMAL
EOSINOPHIL # BLD AUTO: 0 K/UL (ref 0–0.5)
EOSINOPHIL NFR BLD: 1 % (ref 0–8)
ERYTHROCYTE [DISTWIDTH] IN BLOOD BY AUTOMATED COUNT: 15.7 % (ref 11.5–14.5)
EST. GFR  (NO RACE VARIABLE): 39.1 ML/MIN/1.73 M^2
GLUCOSE SERPL-MCNC: 91 MG/DL (ref 70–110)
HCT VFR BLD AUTO: 31.5 % (ref 37–48.5)
HGB BLD-MCNC: 10.5 G/DL (ref 12–16)
IMM GRANULOCYTES # BLD AUTO: 0.03 K/UL (ref 0–0.04)
IMM GRANULOCYTES NFR BLD AUTO: 0.7 % (ref 0–0.5)
LYMPHOCYTES # BLD AUTO: 0.8 K/UL (ref 1–4.8)
LYMPHOCYTES NFR BLD: 20.4 % (ref 18–48)
MCH RBC QN AUTO: 29.3 PG (ref 27–31)
MCHC RBC AUTO-ENTMCNC: 33.3 G/DL (ref 32–36)
MCV RBC AUTO: 88 FL (ref 82–98)
MONOCYTES # BLD AUTO: 0.3 K/UL (ref 0.3–1)
MONOCYTES NFR BLD: 7.8 % (ref 4–15)
NEUTROPHILS # BLD AUTO: 2.9 K/UL (ref 1.8–7.7)
NEUTROPHILS NFR BLD: 69.4 % (ref 38–73)
NRBC BLD-RTO: 0 /100 WBC
PLATELET # BLD AUTO: 199 K/UL (ref 150–450)
PMV BLD AUTO: 10.9 FL (ref 9.2–12.9)
POTASSIUM SERPL-SCNC: 4.9 MMOL/L (ref 3.5–5.1)
PROT SERPL-MCNC: 6.6 G/DL (ref 6–8.4)
RBC # BLD AUTO: 3.58 M/UL (ref 4–5.4)
SODIUM SERPL-SCNC: 137 MMOL/L (ref 136–145)
WBC # BLD AUTO: 4.12 K/UL (ref 3.9–12.7)

## 2022-10-26 PROCEDURE — 85025 COMPLETE CBC W/AUTO DIFF WBC: CPT | Performed by: INTERNAL MEDICINE

## 2022-10-26 PROCEDURE — 80053 COMPREHEN METABOLIC PANEL: CPT | Performed by: INTERNAL MEDICINE

## 2022-10-26 NOTE — TELEPHONE ENCOUNTER
----- Message from Naida Bruce sent at 10/26/2022 12:36 PM CDT -----  Contact: Aimee/[facility] [phone]  Requesting to have a OT evaluation for rotator cuff on left arm.    Please call and advise.    Thank You

## 2022-10-26 NOTE — TELEPHONE ENCOUNTER
----- Message from Naida Bruce sent at 10/26/2022 12:36 PM CDT -----  Contact: Aimee/[facility] [phone]  Requesting to have a OT evaluation for rotator cuff on left arm.    Please call and advise.    Thank You

## 2022-10-26 NOTE — DISCHARGE SUMMARY
[facility] Medicine  Discharge Summary      Patient Name: Jenniffer Jimenez  MRN: [id number]  Patient Class: OP- Observation  Admission Date: 10/7/2022  Hospital Length of Stay: 0 days  Discharge Date and Time: 10/10/2022  6:32 PM  Attending Physician: No att. providers found   Discharging Provider: Chito Jade MD  Primary Care Provider: Rubi Young [facility] Medicine Team: [facility]OSP MED R Chito Jade MD    HPI:   Ms. Jimenez is a 89 year old woman with PMH of HTN, HLD, AFib  s/p Watchman, Diastolic HF, Meniere's disease, venous insufficiency of both lower extremities, CKD III who presents with worsening right leg cellulitis. She reports persistent pain, redness to RLE that began about one month ago. She was recently seen in the ED about 2 week prior to admission and was placed on Augmentin, then later switched to keflex and doxycycline. Despite escalating oral antibiotic regimen, patient's RLE pain and swelling continued to worsen. US Doppler LE negative for DVT on 9/25/22.       In the ED, BP (!) 154/63   Pulse 80   Temp 98 °F (36.7 °C)   Resp 18   LMP  (LMP Unknown)   SpO2 97%   Breastfeeding No . She is sating well on room air. CBC significant for Hb 11.3. CMP significant for bicarb 22. Lactate 1.3. XR RLE showed no acute changes. Patient started on IV clindamycin and admitted to Hospital Medicine for further evaluation and management of RLE cellulitis.       * No surgery found *      Hospital Course:   Patient reports LLE pain and appearance of the wound gradually improved on IV clindamycin. Patient noted that she would like to still attend her family member's wedding on Tuesday 10/11/22 and primary team informed her that she would likely be discharge on 10/10 on oral antibiotics if wound is improving on IV clindamycin in the next few days. Sed rate and CRP WNL so suspicion of cellulitis lower.  No DVT on US 9/25 and no systemic signs of infection.  ID consulted for  antibiotic guidance. ID suspected chronic venous insufficiency and fluid retention secondary heart failure contributing with possible mild sign of cellulitis.  She was switched to doxy and cefpodoxime.  . She was stable for discharge home after wound care evaluation and provided wound instructions and supplies and advised to followup with outpatient wound care. ID recommended to continue Doxy/cefpodoxime x14 d and recommended uoutpatient wound care and vasc sx fu for wound care and evaluation of vascular cause contributing to symptoms. ID followup in 2 weeks. She was switched from cefpodoxime to cefdinir due to copay cost.        Goals of Care Treatment Preferences:  Code Status: Full Code      Consults:   Consults (From admission, onward)        Status Ordering Provider     Inpatient consult to Infectious Diseases  Once        Provider:  (Not yet assigned)    Completed SOPHY SIMPSON          No new Assessment & Plan notes have been filed under this hospital service since the last note was generated.  Service: Hospital Medicine    Final Active Diagnoses:    Diagnosis Date Noted POA    PRINCIPAL PROBLEM:  Cellulitis of right lower extremity [L03.115] 10/07/2022 Yes    Diabetic polyneuropathy [E11.42] 01/06/2022 Yes    Venous insufficiency of both lower extremities [I87.2] 11/22/2019 Yes    Chronic diastolic heart failure [I50.32] 05/04/2016 Yes     Chronic    Stage 3b chronic kidney disease [N18.32] 05/04/2016 Yes    Chronic atrial fibrillation [I48.20] 01/29/2016 Yes     Chronic    Meniere's disease [H81.09] 04/02/2014 Yes    Primary hypertension [I10]  Yes     Chronic    Pure hypercholesterolemia [E78.00] 07/02/2012 Yes     Chronic      Problems Resolved During this Admission:       Discharged Condition: good    Disposition: Home or Self Care    Follow Up:   Follow-up Information     Rubi Young DO. Go on 10/20/2022.    Specialty: Internal Medicine  Why: at 10am on oct 20. thank you  Contact  information:  [address] [facility]  [place] [address]  [phone]             allergy clinic. Call.    Why: Unable to make your allergy appointment.  They will call you to make your appointment.  you can call the above number to follow-up.   thanks  Contact information:  ph: [phone]                     Patient Instructions:      Ambulatory referral/consult to Outpatient Case Management   Referral Priority: Routine Referral Type: Consultation   Referral Reason: Specialty Services Required   Number of Visits Requested: 1     Ambulatory referral/consult to Allergy   Standing Status: Future   Referral Priority: Routine Referral Type: Allergy Testing   Referral Reason: Specialty Services Required   Requested Specialty: Allergy   Number of Visits Requested: 1     Ambulatory referral/consult to Neurology   Standing Status: Future   Referral Priority: Routine Referral Type: Consultation   Referral Reason: Specialty Services Required   Requested Specialty: Neurology   Number of Visits Requested: 1     Ambulatory referral/consult to Infectious Disease   Standing Status: Future   Referral Priority: Routine Referral Type: Consultation   Referral Reason: Specialty Services Required   Requested Specialty: Infectious Diseases   Number of Visits Requested: 1     Ambulatory referral/consult to Vascular Surgery   Standing Status: Future   Referral Priority: Routine Referral Type: Consultation   Referral Reason: Specialty Services Required   Requested Specialty: Vascular Surgery   Number of Visits Requested: 1       Significant Diagnostic Studies: Labs:     CBC:   Latest Reference Range & Units 10/10/22 03:24   WBC 3.90 - 12.70 K/uL 3.13 (L)   RBC 4.00 - 5.40 M/uL 3.31 (L)   Hemoglobin 12.0 - 16.0 g/dL 9.6 (L)   Hematocrit 37.0 - 48.5 % 29.7 (L)   MCV 82 - 98 fL 90   MCH 27.0 - 31.0 pg 29.0   MCHC 32.0 - 36.0 g/dL 32.3   RDW 11.5 - 14.5 % 15.7 (H)   Platelets 150 - 450 K/uL 140 (L)   (L): Data is abnormally low  (H):  Data is abnormally high    CMP:   Latest Reference Range & Units 10/10/22 03:24   Sodium 136 - 145 mmol/L 136   Potassium 3.5 - 5.1 mmol/L 4.3   Chloride 95 - 110 mmol/L 107   CO2 23 - 29 mmol/L 19 (L)   Anion Gap 8 - 16 mmol/L 10   BUN 8 - 23 mg/dL 38 (H)   Creatinine 0.5 - 1.4 mg/dL 1.2   eGFR >60 mL/min/1.73 m^2 43.0 !   Glucose 70 - 110 mg/dL 93   Calcium 8.7 - 10.5 mg/dL 8.8   Phosphorus 2.7 - 4.5 mg/dL 4.5   Magnesium 1.6 - 2.6 mg/dL 2.0   Alkaline Phosphatase 55 - 135 U/L 95   PROTEIN TOTAL 6.0 - 8.4 g/dL 5.5 (L)   Albumin 3.5 - 5.2 g/dL 3.2 (L)   BILIRUBIN TOTAL 0.1 - 1.0 mg/dL 0.4   AST 10 - 40 U/L 12   ALT 10 - 44 U/L 9 (L)   (L): Data is abnormally low  (H): Data is abnormally high  !: Data is abnormal      Microbiology:   Microbiology Results (last 7 days)     ** No results found for the last 168 hours. **          Radiology:   Imaging Results          X-Ray Tibia Fibula 2 View Right (Final result)  Result time 10/07/22 17:37:47    Final result by Austin Us MD (10/07/22 17:37:47)                 Impression:      No acute process.      Electronically signed by: Austin Us MD  Date:    10/07/2022  Time:    17:37             Narrative:    EXAMINATION:  XR TIBIA FIBULA 2 VIEW RIGHT    CLINICAL HISTORY:  Unspecified open wound, right lower leg, subsequent encounter    TECHNIQUE:  AP and lateral views of the right tibia and fibula were performed.    COMPARISON:  01/01/2020.    FINDINGS:  The bone mineralization is within normal limits.  There is no cortical step-off.  There is no evidence of periostitis.    The joint spaces are maintained.  There are vascular calcifications.  The soft tissues are otherwise unremarkable.    There is no evidence of a fracture or dislocation of the right tibia/fibula.                                  Pending Diagnostic Studies:     None         Medications:  Reconciled Home Medications:      Medication List      START taking these medications    CRITIC-AID CLEAR  AF(MICONAZOL) 2 % Oint  Generic drug: miconazole nitrate 2%  Apply topically every other day.     fluticasone propionate 50 mcg/actuation nasal spray  Commonly known as: FLONASE  USE 1 SPRAY IN EACH NOSTRIL ONE TIME DAILY        CHANGE how you take these medications    furosemide 20 MG tablet  Commonly known as: LASIX  Take 1 tablet (20 mg total) by mouth 2 (two) times daily before meals. Take an extra tablet twice a day for Weight Gain > 2-3 lbs in 1 day or 4-6 lbs over 1 week.  What changed: additional instructions        CONTINUE taking these medications    aspirin 81 MG EC tablet  Commonly known as: ECOTRIN  Take 1 tablet (81 mg total) by mouth once daily.     azelastine 137 mcg (0.1 %) nasal spray  Commonly known as: ASTELIN  1 spray (137 mcg total) by Nasal route 2 (two) times daily.     hydrOXYzine HCL 25 MG tablet  Commonly known as: ATARAX  Take 1 tablet (25 mg total) by mouth 3 (three) times daily as needed for Itching.     OCUVITE LUTEIN AND ZEAXANTHIN ORAL  Take 1 capsule by mouth once daily. Lutien 25 mg, Zeaxanthin 5 mg     pravastatin 40 MG tablet  Commonly known as: PRAVACHOL  Take 1 tablet (40 mg total) by mouth once daily.     psyllium 0.52 gram capsule  Take 9 g by mouth once daily. Pt takes 3 capsules 3 times a day     spironolactone 25 MG tablet  Commonly known as: ALDACTONE  Take 1 tablet (25 mg total) by mouth 2 (two) times daily before meals.     VITAMIN D3 ORAL  Take 1 tablet by mouth once daily.     vitamin E 400 UNIT capsule  Take 400 Units by mouth once daily.        STOP taking these medications    diclofenac sodium 1 % Gel  Commonly known as: VOLTAREN     mupirocin 2 % ointment  Commonly known as: BACTROBAN        ASK your doctor about these medications    doxycycline 100 MG tablet  Commonly known as: VIBRA-TABS  Take 1 tablet (100 mg total) by mouth 2 (two) times a day. for 14 days  Ask about: Should I take this medication?            Indwelling Lines/Drains at time of discharge:    Lines/Drains/Airways     None                 Time spent on the discharge of patient: 45 minutes         Chito Jade MD  Department of Hospital Medicine  [facility]

## 2022-10-26 NOTE — TELEPHONE ENCOUNTER
[facility] home help nurse fantasma is requesting a referral for occupational therapy for pt ms. Jenniffer, fantasma states that she can only lift her arm to an 45 degree angle.      Please place referral .

## 2022-10-26 NOTE — HOSPITAL COURSE
Patient reports LLE pain and appearance of the wound gradually improved on IV clindamycin. Patient noted that she would like to still attend her family member's wedding on Tuesday 10/11/22 and primary team informed her that she would likely be discharge on 10/10 on oral antibiotics if wound is improving on IV clindamycin in the next few days. Sed rate and CRP WNL so suspicion of cellulitis lower.  No DVT on US 9/25 and no systemic signs of infection.  ID consulted for antibiotic guidance. ID suspected chronic venous insufficiency and fluid retention secondary heart failure contributing with possible mild sign of cellulitis.  She was switched to doxy and cefpodoxime.  . She was stable for discharge home after wound care evaluation and provided wound instructions and supplies and advised to followup with outpatient wound care. ID recommended to continue Doxy/cefpodoxime x14 d and recommended uoutpatient wound care and vasc sx fu for wound care and evaluation of vascular cause contributing to symptoms. ID followup in 2 weeks. She was switched from cefpodoxime to cefdinir due to copay cost.

## 2022-10-28 ENCOUNTER — TELEPHONE (OUTPATIENT)
Dept: INTERNAL MEDICINE | Facility: CLINIC | Age: 87
End: 2022-10-28
Payer: MEDICARE

## 2022-10-28 NOTE — TELEPHONE ENCOUNTER
----- Message from Marty Carrasquillo MD sent at 10/26/2022  7:40 AM CDT -----  Please see derm referral  Thank you for your time.

## 2022-11-01 ENCOUNTER — TELEPHONE (OUTPATIENT)
Dept: INTERNAL MEDICINE | Facility: CLINIC | Age: 87
End: 2022-11-01
Payer: MEDICARE

## 2022-11-01 NOTE — TELEPHONE ENCOUNTER
Spoke with Anabelle from [facility] , in regards needing to know if pt's Meniere's disease is in the Left , right ear or both ears .

## 2022-11-01 NOTE — TELEPHONE ENCOUNTER
----- Message from Steff Wilson sent at 11/1/2022 12:09 PM CDT -----  Contact: [facility]/Anabelle/[phone]  Anabelle from [facility] said that she Is calling in regards to needing to know if pt's Meniere's disease is in the Left , right ear or both . Please advise

## 2022-11-01 NOTE — TELEPHONE ENCOUNTER
----- Message from Steff Wilson sent at 11/1/2022 12:09 PM CDT -----  Contact: [facility]/Anabelle/[phone]  Anabelle from [facility] said that she Is calling in regards to needing to know if pt's Meniere's disease is in the Left , right ear or both . Please advise

## 2022-11-02 ENCOUNTER — HOSPITAL ENCOUNTER (OUTPATIENT)
Dept: WOUND CARE | Facility: HOSPITAL | Age: 87
Discharge: HOME | End: 2022-11-02
Attending: SURGERY
Payer: MEDICARE

## 2022-11-02 VITALS
SYSTOLIC BLOOD PRESSURE: 170 MMHG | HEART RATE: 83 BPM | TEMPERATURE: 98 F | BODY MASS INDEX: 27.82 KG/M2 | DIASTOLIC BLOOD PRESSURE: 74 MMHG | HEIGHT: 65 IN | WEIGHT: 167 LBS

## 2022-11-02 DIAGNOSIS — S81.801S LEG WOUND, RIGHT, SEQUELA: ICD-10-CM

## 2022-11-02 DIAGNOSIS — L03.115 CELLULITIS OF RIGHT LOWER EXTREMITY: Primary | ICD-10-CM

## 2022-11-02 PROCEDURE — 87186 SC STD MICRODIL/AGAR DIL: CPT | Performed by: SURGERY

## 2022-11-02 PROCEDURE — 87075 CULTR BACTERIA EXCEPT BLOOD: CPT | Performed by: SURGERY

## 2022-11-02 PROCEDURE — 29581 APPL MULTLAYER CMPRN SYS LEG: CPT

## 2022-11-02 PROCEDURE — 87077 CULTURE AEROBIC IDENTIFY: CPT | Performed by: SURGERY

## 2022-11-02 PROCEDURE — 87070 CULTURE OTHR SPECIMN AEROBIC: CPT | Performed by: SURGERY

## 2022-11-02 RX ORDER — DOXYCYCLINE HYCLATE 100 MG
100 TABLET ORAL 2 TIMES DAILY
Qty: 30 TABLET | Refills: 2 | Status: ON HOLD | OUTPATIENT
Start: 2022-11-02 | End: 2022-11-10 | Stop reason: HOSPADM

## 2022-11-02 RX ORDER — MUPIROCIN 20 MG/G
OINTMENT TOPICAL 3 TIMES DAILY
Qty: 22 G | Refills: 2 | Status: ON HOLD | OUTPATIENT
Start: 2022-11-02 | End: 2023-01-16 | Stop reason: HOSPADM

## 2022-11-02 NOTE — PROGRESS NOTES
[facility]    Subjective:       Patient ID: Jenniffer Jimenez is a 89 y.o. female.    Chief Complaint: Non-healing Wound (Right Lower Leg Cellulitis)    11/2/2022: Patient admitted to Wound Care Clinic for Right Lower Leg Wound / Cellulitis. Wound to right leg Assessed and  a Treatment Plan was discussed with patient. Dressings and Compression Wrap with medicated ointment applied to leg wound per order, tolerated it well. Home Health to perform Dressing changes every other day. Instructed to return to Wound Clinic in 1 week (11/9/2022). Patient verbalized understanding of treatment and next visit.   Review of Systems   Constitutional: Negative.    HENT: Negative.     Eyes: Negative.    Respiratory: Negative.     Cardiovascular: Negative.    Gastrointestinal: Negative.    Genitourinary: Negative.    Musculoskeletal: Negative.    Skin: Negative.    Neurological: Negative.    Psychiatric/Behavioral: Negative.         Objective:      Physical Exam  Constitutional:       Appearance: She is well-developed.   HENT:      Head: Normocephalic.   Eyes:      Conjunctiva/sclera: Conjunctivae normal.      Pupils: Pupils are equal, round, and reactive to light.   Cardiovascular:      Rate and Rhythm: Normal rate and regular rhythm.      Heart sounds: Normal heart sounds.   Pulmonary:      Effort: Pulmonary effort is normal.      Breath sounds: Normal breath sounds.   Abdominal:      General: Bowel sounds are normal.      Palpations: Abdomen is soft.   Musculoskeletal:         General: Normal range of motion.      Cervical back: Normal range of motion and neck supple.   Skin:     General: Skin is warm and dry.   Neurological:      Mental Status: She is alert and oriented to person, place, and time.      Deep Tendon Reflexes: Reflexes are normal and symmetric.        Altered Skin Integrity 09/25/22 2010 Right anterior;lower Leg #1 (Active)   09/25/22 2010   Altered Skin Integrity  Present on Admission: yes   Side: Right   Orientation: anterior;lower   Location: Leg   Wound Number: #1   Is this injury device related?:    Primary Wound Type:    Description of Altered Skin Integrity:    Ankle-Brachial Index:    Pulses:    Removal Indication and Assessment:    Wound Outcome:    (Retired) Wound Length (cm):    (Retired) Wound Width (cm):    (Retired) Depth (cm):    Wound Description (Comments):    Removal Indications:    Wound Image    11/02/22 1422   Description of Altered Skin Integrity Full thickness tissue loss. Subcutaneous fat may be visible but bone, tendon or muscle are not exposed 11/02/22 1422   Dressing Appearance Moist drainage;Open to air 11/02/22 1422   Drainage Amount Moderate 11/02/22 1422   Drainage Characteristics/Odor Serosanguineous;Yellow;Creamy;No odor 11/02/22 1422   Appearance Pink;Red;Blistered;Moist;Granulating;Yellow 11/02/22 1422   Tissue loss description Full thickness 11/02/22 1422   Red (%), Wound Tissue Color 80 % 11/02/22 1422   Yellow (%), Wound Tissue Color 20 % 11/02/22 1422   Periwound Area Hemosiderin Staining;Macerated;Moist;Blistered;Swelling 11/02/22 1422   Wound Edges Irregular;Open;Undefined 11/02/22 1422   Wound Length (cm) 14 cm 11/02/22 1422   Wound Width (cm) 8.3 cm 11/02/22 1422   Wound Depth (cm) 0.2 cm 11/02/22 1422   Wound Volume (cm^3) 23.24 cm^3 11/02/22 1422   Wound Surface Area (cm^2) 116.2 cm^2 11/02/22 1422   Care Cleansed with:;Soap and water;Sterile normal saline 11/02/22 1422   Dressing Applied;Non-adherent;Compression wrap;Rolled gauze 11/02/22 1422   Periwound Care Dry periwound area maintained 11/02/22 1422   Compression Two layer compression 11/02/22 1422   Off Loading Off loading shoe 11/02/22 1422   Dressing Change Due 11/04/22 11/02/22 1422         Assessment/Plan:         ICD-10-CM ICD-9-CM   1. Cellulitis of right lower extremity  L03.115 682.6   2. Leg wound, right, sequela  S81.801S 906.1           Tissue pathology and/or  culture taken:  [x] Yes [] No   Sharp debridement performed:   [] Yes [x] No   Labs ordered this visit:   [] Yes [x] No   Imaging ordered this visit:   [] Yes [x] No           Orders Placed This Encounter   Procedures    Aerobic culture          Anaerobic culture          Ambulatory referral/consult to Wound Clinic     Standing Status:   Standing     Number of Occurrences:   1     Referral Priority:   Urgent     Referral Type:   Consultation     Referral Reason:   Specialty Services Required     Requested Specialty:   Wound Care     Number of Visits Requested:   1    Change dressing     Right Lower Leg Cellulitis:    Cleanse wound with: Soap and Water, Sterile Saline  Lidocaine:PRN  Silver nitrate: PRN  Periwound care: Moisturizer  Primary dressing: Bactroban,  Adaptic, Soft Rolled Gauze (Wound area only)  Secondary dressing:  Co-Flex with Calamine  2 layer Compression  Offloading: Darco Shoe  Edema control: Elevate Rt. Leg Frequently  Frequency: Every other Day  Follow-up: 1 Week with Dr. Chavez ( 11/9/2022)  Home Health: Dressing changes every other Day per Home Health except when in Wound Clinic.   Other Orders: Culture Done to Rt. Leg and sent. Continue Antibiotic that she is taking presently.        Follow up in about 1 week (around 11/9/2022) for Dr. Chavez.

## 2022-11-05 LAB — BACTERIA SPEC AEROBE CULT: ABNORMAL

## 2022-11-07 ENCOUNTER — TELEPHONE (OUTPATIENT)
Dept: CARDIOLOGY | Facility: CLINIC | Age: 87
End: 2022-11-07
Payer: MEDICARE

## 2022-11-07 ENCOUNTER — HOSPITAL ENCOUNTER (OUTPATIENT)
Facility: HOSPITAL | Age: 87
Discharge: HOME HEALTH | End: 2022-11-10
Attending: EMERGENCY MEDICINE | Admitting: EMERGENCY MEDICINE
Payer: MEDICARE

## 2022-11-07 DIAGNOSIS — R07.9 CHEST PAIN: ICD-10-CM

## 2022-11-07 DIAGNOSIS — I12.9 HYPERTENSIVE CHRONIC KIDNEY DISEASE WITH STAGE 1 THROUGH STAGE 4 CHRONIC KIDNEY DISEASE, OR UNSPECIFIED CHRONIC KIDNEY DISEASE: ICD-10-CM

## 2022-11-07 DIAGNOSIS — L03.90 CELLULITIS: ICD-10-CM

## 2022-11-07 DIAGNOSIS — S81.801A WOUND OF RIGHT LOWER EXTREMITY: ICD-10-CM

## 2022-11-07 DIAGNOSIS — I87.2 VENOUS INSUFFICIENCY OF BOTH LOWER EXTREMITIES: ICD-10-CM

## 2022-11-07 DIAGNOSIS — S81.801A NON-HEALING WOUND OF RIGHT LOWER EXTREMITY: Primary | ICD-10-CM

## 2022-11-07 DIAGNOSIS — I10 ESSENTIAL HYPERTENSION: Chronic | ICD-10-CM

## 2022-11-07 DIAGNOSIS — I48.91 A-FIB: ICD-10-CM

## 2022-11-07 DIAGNOSIS — L03.115 CELLULITIS OF RIGHT LOWER EXTREMITY: ICD-10-CM

## 2022-11-07 DIAGNOSIS — I73.9 PVD (PERIPHERAL VASCULAR DISEASE): ICD-10-CM

## 2022-11-07 DIAGNOSIS — I50.32 CHRONIC DIASTOLIC HEART FAILURE: Chronic | ICD-10-CM

## 2022-11-07 LAB
BACTERIA SPEC ANAEROBE CULT: NORMAL
BASOPHILS # BLD AUTO: 0.04 K/UL (ref 0–0.2)
BASOPHILS NFR BLD: 0.8 % (ref 0–1.9)
DIFFERENTIAL METHOD: ABNORMAL
EOSINOPHIL # BLD AUTO: 0.1 K/UL (ref 0–0.5)
EOSINOPHIL NFR BLD: 1.4 % (ref 0–8)
ERYTHROCYTE [DISTWIDTH] IN BLOOD BY AUTOMATED COUNT: 15.5 % (ref 11.5–14.5)
HCT VFR BLD AUTO: 30.4 % (ref 37–48.5)
HGB BLD-MCNC: 10 G/DL (ref 12–16)
IMM GRANULOCYTES # BLD AUTO: 0.03 K/UL (ref 0–0.04)
IMM GRANULOCYTES NFR BLD AUTO: 0.6 % (ref 0–0.5)
LYMPHOCYTES # BLD AUTO: 0.9 K/UL (ref 1–4.8)
LYMPHOCYTES NFR BLD: 17.6 % (ref 18–48)
MCH RBC QN AUTO: 28.9 PG (ref 27–31)
MCHC RBC AUTO-ENTMCNC: 32.9 G/DL (ref 32–36)
MCV RBC AUTO: 88 FL (ref 82–98)
MONOCYTES # BLD AUTO: 0.5 K/UL (ref 0.3–1)
MONOCYTES NFR BLD: 10.8 % (ref 4–15)
NEUTROPHILS # BLD AUTO: 3.4 K/UL (ref 1.8–7.7)
NEUTROPHILS NFR BLD: 68.8 % (ref 38–73)
NRBC BLD-RTO: 0 /100 WBC
PLATELET # BLD AUTO: 220 K/UL (ref 150–450)
PMV BLD AUTO: 10.2 FL (ref 9.2–12.9)
RBC # BLD AUTO: 3.46 M/UL (ref 4–5.4)
WBC # BLD AUTO: 4.9 K/UL (ref 3.9–12.7)

## 2022-11-07 PROCEDURE — 99284 PR EMERGENCY DEPT VISIT,LEVEL IV: ICD-10-PCS | Mod: ,,, | Performed by: EMERGENCY MEDICINE

## 2022-11-07 PROCEDURE — 99285 EMERGENCY DEPT VISIT HI MDM: CPT | Mod: 25

## 2022-11-07 PROCEDURE — 80053 COMPREHEN METABOLIC PANEL: CPT | Performed by: EMERGENCY MEDICINE

## 2022-11-07 PROCEDURE — 85025 COMPLETE CBC W/AUTO DIFF WBC: CPT | Performed by: EMERGENCY MEDICINE

## 2022-11-07 PROCEDURE — 99284 EMERGENCY DEPT VISIT MOD MDM: CPT | Mod: ,,, | Performed by: EMERGENCY MEDICINE

## 2022-11-07 RX ORDER — FUROSEMIDE 20 MG/1
20 TABLET ORAL
Qty: 180 TABLET | Refills: 3 | Status: ON HOLD | OUTPATIENT
Start: 2022-11-07 | End: 2023-01-23 | Stop reason: HOSPADM

## 2022-11-07 RX ORDER — SPIRONOLACTONE 25 MG/1
TABLET ORAL
Qty: 180 TABLET | Refills: 3 | Status: ON HOLD | OUTPATIENT
Start: 2022-11-07 | End: 2023-01-23 | Stop reason: HOSPADM

## 2022-11-07 NOTE — TELEPHONE ENCOUNTER
----- Message from Jeri Mock sent at 11/7/2022  1:45 PM CST -----  Regarding: clarify medication  Aimee with [facility] [phone] is calling to clarify pt's fluid medication.  The directions on the bottle and what the pt is taking does not match up and pt has had wt gain.    Thank you

## 2022-11-08 PROBLEM — S81.801A NON-HEALING WOUND OF RIGHT LOWER EXTREMITY: Status: ACTIVE | Noted: 2022-11-08

## 2022-11-08 PROBLEM — L03.90 CELLULITIS: Status: ACTIVE | Noted: 2022-11-08

## 2022-11-08 LAB
ALBUMIN SERPL BCP-MCNC: 3 G/DL (ref 3.5–5.2)
ALBUMIN SERPL BCP-MCNC: 3.4 G/DL (ref 3.5–5.2)
ALP SERPL-CCNC: 117 U/L (ref 55–135)
ALP SERPL-CCNC: 132 U/L (ref 55–135)
ALT SERPL W/O P-5'-P-CCNC: 10 U/L (ref 10–44)
ALT SERPL W/O P-5'-P-CCNC: 10 U/L (ref 10–44)
ANION GAP SERPL CALC-SCNC: 10 MMOL/L (ref 8–16)
ANION GAP SERPL CALC-SCNC: 11 MMOL/L (ref 8–16)
AST SERPL-CCNC: 15 U/L (ref 10–40)
AST SERPL-CCNC: 16 U/L (ref 10–40)
BACTERIA #/AREA URNS AUTO: ABNORMAL /HPF
BASOPHILS # BLD AUTO: 0.01 K/UL (ref 0–0.2)
BASOPHILS NFR BLD: 0.3 % (ref 0–1.9)
BILIRUB SERPL-MCNC: 0.5 MG/DL (ref 0.1–1)
BILIRUB SERPL-MCNC: 0.6 MG/DL (ref 0.1–1)
BILIRUB UR QL STRIP: NEGATIVE
BUN SERPL-MCNC: 29 MG/DL (ref 8–23)
BUN SERPL-MCNC: 30 MG/DL (ref 8–23)
CALCIUM SERPL-MCNC: 8.7 MG/DL (ref 8.7–10.5)
CALCIUM SERPL-MCNC: 9.3 MG/DL (ref 8.7–10.5)
CHLORIDE SERPL-SCNC: 102 MMOL/L (ref 95–110)
CHLORIDE SERPL-SCNC: 105 MMOL/L (ref 95–110)
CLARITY UR REFRACT.AUTO: ABNORMAL
CO2 SERPL-SCNC: 21 MMOL/L (ref 23–29)
CO2 SERPL-SCNC: 22 MMOL/L (ref 23–29)
COLOR UR AUTO: YELLOW
CREAT SERPL-MCNC: 1.3 MG/DL (ref 0.5–1.4)
CREAT SERPL-MCNC: 1.3 MG/DL (ref 0.5–1.4)
DIFFERENTIAL METHOD: ABNORMAL
EOSINOPHIL # BLD AUTO: 0.1 K/UL (ref 0–0.5)
EOSINOPHIL NFR BLD: 1.3 % (ref 0–8)
ERYTHROCYTE [DISTWIDTH] IN BLOOD BY AUTOMATED COUNT: 15.4 % (ref 11.5–14.5)
EST. GFR  (NO RACE VARIABLE): 39.1 ML/MIN/1.73 M^2
EST. GFR  (NO RACE VARIABLE): 39.1 ML/MIN/1.73 M^2
GLUCOSE SERPL-MCNC: 115 MG/DL (ref 70–110)
GLUCOSE SERPL-MCNC: 119 MG/DL (ref 70–110)
GLUCOSE UR QL STRIP: NEGATIVE
HCT VFR BLD AUTO: 26.2 % (ref 37–48.5)
HGB BLD-MCNC: 9 G/DL (ref 12–16)
HGB UR QL STRIP: NEGATIVE
HYALINE CASTS UR QL AUTO: 1 /LPF
IMM GRANULOCYTES # BLD AUTO: 0.02 K/UL (ref 0–0.04)
IMM GRANULOCYTES NFR BLD AUTO: 0.5 % (ref 0–0.5)
KETONES UR QL STRIP: NEGATIVE
LACTATE SERPL-SCNC: 0.8 MMOL/L (ref 0.5–2.2)
LEUKOCYTE ESTERASE UR QL STRIP: ABNORMAL
LYMPHOCYTES # BLD AUTO: 0.7 K/UL (ref 1–4.8)
LYMPHOCYTES NFR BLD: 17.8 % (ref 18–48)
MAGNESIUM SERPL-MCNC: 2.2 MG/DL (ref 1.6–2.6)
MCH RBC QN AUTO: 29.7 PG (ref 27–31)
MCHC RBC AUTO-ENTMCNC: 34.4 G/DL (ref 32–36)
MCV RBC AUTO: 87 FL (ref 82–98)
MICROSCOPIC COMMENT: ABNORMAL
MONOCYTES # BLD AUTO: 0.5 K/UL (ref 0.3–1)
MONOCYTES NFR BLD: 12.8 % (ref 4–15)
NEUTROPHILS # BLD AUTO: 2.7 K/UL (ref 1.8–7.7)
NEUTROPHILS NFR BLD: 67.3 % (ref 38–73)
NITRITE UR QL STRIP: NEGATIVE
NRBC BLD-RTO: 0 /100 WBC
PH UR STRIP: 6 [PH] (ref 5–8)
PHOSPHATE SERPL-MCNC: 3.9 MG/DL (ref 2.7–4.5)
PLATELET # BLD AUTO: 193 K/UL (ref 150–450)
PMV BLD AUTO: 10.6 FL (ref 9.2–12.9)
POCT GLUCOSE: 125 MG/DL (ref 70–110)
POCT GLUCOSE: 152 MG/DL (ref 70–110)
POCT GLUCOSE: 99 MG/DL (ref 70–110)
POTASSIUM SERPL-SCNC: 4.4 MMOL/L (ref 3.5–5.1)
POTASSIUM SERPL-SCNC: 4.8 MMOL/L (ref 3.5–5.1)
PROT SERPL-MCNC: 6 G/DL (ref 6–8.4)
PROT SERPL-MCNC: 6.7 G/DL (ref 6–8.4)
PROT UR QL STRIP: NEGATIVE
RBC # BLD AUTO: 3.03 M/UL (ref 4–5.4)
RBC #/AREA URNS AUTO: 0 /HPF (ref 0–4)
SODIUM SERPL-SCNC: 133 MMOL/L (ref 136–145)
SODIUM SERPL-SCNC: 138 MMOL/L (ref 136–145)
SP GR UR STRIP: 1.01 (ref 1–1.03)
SQUAMOUS #/AREA URNS AUTO: 1 /HPF
URN SPEC COLLECT METH UR: ABNORMAL
WBC # BLD AUTO: 4 K/UL (ref 3.9–12.7)
WBC #/AREA URNS AUTO: 8 /HPF (ref 0–5)

## 2022-11-08 PROCEDURE — 96372 THER/PROPH/DIAG INJ SC/IM: CPT | Mod: 59

## 2022-11-08 PROCEDURE — 80053 COMPREHEN METABOLIC PANEL: CPT | Performed by: STUDENT IN AN ORGANIZED HEALTH CARE EDUCATION/TRAINING PROGRAM

## 2022-11-08 PROCEDURE — G0378 HOSPITAL OBSERVATION PER HR: HCPCS

## 2022-11-08 PROCEDURE — 93010 EKG 12-LEAD: ICD-10-PCS | Mod: ,,, | Performed by: INTERNAL MEDICINE

## 2022-11-08 PROCEDURE — 63600175 PHARM REV CODE 636 W HCPCS

## 2022-11-08 PROCEDURE — 93010 ELECTROCARDIOGRAM REPORT: CPT | Mod: ,,, | Performed by: INTERNAL MEDICINE

## 2022-11-08 PROCEDURE — 25000003 PHARM REV CODE 250

## 2022-11-08 PROCEDURE — 99220 PR INITIAL OBSERVATION CARE,LEVL III: ICD-10-PCS | Mod: ,,,

## 2022-11-08 PROCEDURE — 99220 PR INITIAL OBSERVATION CARE,LEVL III: CPT | Mod: ,,,

## 2022-11-08 PROCEDURE — 93005 ELECTROCARDIOGRAM TRACING: CPT

## 2022-11-08 PROCEDURE — 99213 PR OFFICE/OUTPT VISIT, EST, LEVL III, 20-29 MIN: ICD-10-PCS | Mod: ,,, | Performed by: INTERNAL MEDICINE

## 2022-11-08 PROCEDURE — 85025 COMPLETE CBC W/AUTO DIFF WBC: CPT | Performed by: STUDENT IN AN ORGANIZED HEALTH CARE EDUCATION/TRAINING PROGRAM

## 2022-11-08 PROCEDURE — 84100 ASSAY OF PHOSPHORUS: CPT | Performed by: STUDENT IN AN ORGANIZED HEALTH CARE EDUCATION/TRAINING PROGRAM

## 2022-11-08 PROCEDURE — 63600175 PHARM REV CODE 636 W HCPCS: Performed by: EMERGENCY MEDICINE

## 2022-11-08 PROCEDURE — 83605 ASSAY OF LACTIC ACID: CPT | Performed by: EMERGENCY MEDICINE

## 2022-11-08 PROCEDURE — 96366 THER/PROPH/DIAG IV INF ADDON: CPT

## 2022-11-08 PROCEDURE — 81001 URINALYSIS AUTO W/SCOPE: CPT | Performed by: EMERGENCY MEDICINE

## 2022-11-08 PROCEDURE — 96365 THER/PROPH/DIAG IV INF INIT: CPT

## 2022-11-08 PROCEDURE — 25000242 PHARM REV CODE 250 ALT 637 W/ HCPCS

## 2022-11-08 PROCEDURE — 82962 GLUCOSE BLOOD TEST: CPT | Mod: 91

## 2022-11-08 PROCEDURE — 83735 ASSAY OF MAGNESIUM: CPT | Performed by: STUDENT IN AN ORGANIZED HEALTH CARE EDUCATION/TRAINING PROGRAM

## 2022-11-08 PROCEDURE — 99213 OFFICE O/P EST LOW 20 MIN: CPT | Mod: ,,, | Performed by: INTERNAL MEDICINE

## 2022-11-08 PROCEDURE — 25000003 PHARM REV CODE 250: Performed by: EMERGENCY MEDICINE

## 2022-11-08 RX ORDER — BISACODYL 10 MG
10 SUPPOSITORY, RECTAL RECTAL DAILY PRN
Status: DISCONTINUED | OUTPATIENT
Start: 2022-11-08 | End: 2022-11-10 | Stop reason: HOSPADM

## 2022-11-08 RX ORDER — CHOLECALCIFEROL (VITAMIN D3) 25 MCG
1000 TABLET ORAL DAILY
Status: DISCONTINUED | OUTPATIENT
Start: 2022-11-08 | End: 2022-11-10 | Stop reason: HOSPADM

## 2022-11-08 RX ORDER — IPRATROPIUM BROMIDE AND ALBUTEROL SULFATE 2.5; .5 MG/3ML; MG/3ML
3 SOLUTION RESPIRATORY (INHALATION) EVERY 4 HOURS PRN
Status: DISCONTINUED | OUTPATIENT
Start: 2022-11-08 | End: 2022-11-10 | Stop reason: HOSPADM

## 2022-11-08 RX ORDER — SODIUM CHLORIDE 0.9 % (FLUSH) 0.9 %
5 SYRINGE (ML) INJECTION
Status: DISCONTINUED | OUTPATIENT
Start: 2022-11-08 | End: 2022-11-10 | Stop reason: HOSPADM

## 2022-11-08 RX ORDER — VANCOMYCIN HCL IN 5 % DEXTROSE 1G/250ML
1000 PLASTIC BAG, INJECTION (ML) INTRAVENOUS
Status: COMPLETED | OUTPATIENT
Start: 2022-11-08 | End: 2022-11-08

## 2022-11-08 RX ORDER — ASPIRIN 81 MG/1
81 TABLET ORAL DAILY
Status: DISCONTINUED | OUTPATIENT
Start: 2022-11-08 | End: 2022-11-10 | Stop reason: HOSPADM

## 2022-11-08 RX ORDER — ONDANSETRON 8 MG/1
8 TABLET, ORALLY DISINTEGRATING ORAL EVERY 8 HOURS PRN
Status: DISCONTINUED | OUTPATIENT
Start: 2022-11-08 | End: 2022-11-10 | Stop reason: HOSPADM

## 2022-11-08 RX ORDER — ACETAMINOPHEN 500 MG
1000 TABLET ORAL EVERY 8 HOURS PRN
Status: DISCONTINUED | OUTPATIENT
Start: 2022-11-08 | End: 2022-11-10 | Stop reason: HOSPADM

## 2022-11-08 RX ORDER — FLUTICASONE PROPIONATE 50 MCG
1 SPRAY, SUSPENSION (ML) NASAL DAILY
Status: DISCONTINUED | OUTPATIENT
Start: 2022-11-08 | End: 2022-11-09

## 2022-11-08 RX ORDER — ACETAMINOPHEN 325 MG/1
650 TABLET ORAL EVERY 4 HOURS PRN
Status: DISCONTINUED | OUTPATIENT
Start: 2022-11-08 | End: 2022-11-10 | Stop reason: HOSPADM

## 2022-11-08 RX ORDER — SPIRONOLACTONE 25 MG/1
25 TABLET ORAL DAILY
Status: DISCONTINUED | OUTPATIENT
Start: 2022-11-08 | End: 2022-11-10 | Stop reason: HOSPADM

## 2022-11-08 RX ORDER — PRAVASTATIN SODIUM 40 MG/1
40 TABLET ORAL DAILY
Status: DISCONTINUED | OUTPATIENT
Start: 2022-11-08 | End: 2022-11-10 | Stop reason: HOSPADM

## 2022-11-08 RX ORDER — HEPARIN SODIUM 5000 [USP'U]/ML
5000 INJECTION, SOLUTION INTRAVENOUS; SUBCUTANEOUS EVERY 8 HOURS
Status: DISCONTINUED | OUTPATIENT
Start: 2022-11-08 | End: 2022-11-10 | Stop reason: HOSPADM

## 2022-11-08 RX ORDER — ACETAMINOPHEN 500 MG
500 TABLET ORAL
Status: COMPLETED | OUTPATIENT
Start: 2022-11-08 | End: 2022-11-08

## 2022-11-08 RX ORDER — NALOXONE HCL 0.4 MG/ML
0.02 VIAL (ML) INJECTION
Status: DISCONTINUED | OUTPATIENT
Start: 2022-11-08 | End: 2022-11-10 | Stop reason: HOSPADM

## 2022-11-08 RX ORDER — AZELASTINE 1 MG/ML
1 SPRAY, METERED NASAL 2 TIMES DAILY
Status: DISCONTINUED | OUTPATIENT
Start: 2022-11-08 | End: 2022-11-10 | Stop reason: HOSPADM

## 2022-11-08 RX ORDER — IBUPROFEN 200 MG
16 TABLET ORAL
Status: DISCONTINUED | OUTPATIENT
Start: 2022-11-08 | End: 2022-11-10 | Stop reason: HOSPADM

## 2022-11-08 RX ORDER — SIMETHICONE 80 MG
1 TABLET,CHEWABLE ORAL 4 TIMES DAILY PRN
Status: DISCONTINUED | OUTPATIENT
Start: 2022-11-08 | End: 2022-11-10 | Stop reason: HOSPADM

## 2022-11-08 RX ORDER — POLYETHYLENE GLYCOL 3350 17 G/17G
17 POWDER, FOR SOLUTION ORAL 2 TIMES DAILY PRN
Status: DISCONTINUED | OUTPATIENT
Start: 2022-11-08 | End: 2022-11-10 | Stop reason: HOSPADM

## 2022-11-08 RX ORDER — GUAIFENESIN 100 MG/5ML
15 SOLUTION ORAL DAILY
Status: ON HOLD | COMMUNITY
End: 2023-01-29 | Stop reason: HOSPADM

## 2022-11-08 RX ORDER — FUROSEMIDE 20 MG/1
20 TABLET ORAL
Status: DISCONTINUED | OUTPATIENT
Start: 2022-11-08 | End: 2022-11-10 | Stop reason: HOSPADM

## 2022-11-08 RX ORDER — HYDROXYZINE HYDROCHLORIDE 25 MG/1
25 TABLET, FILM COATED ORAL 3 TIMES DAILY PRN
Status: DISCONTINUED | OUTPATIENT
Start: 2022-11-08 | End: 2022-11-10 | Stop reason: HOSPADM

## 2022-11-08 RX ORDER — INSULIN ASPART 100 [IU]/ML
0-5 INJECTION, SOLUTION INTRAVENOUS; SUBCUTANEOUS
Status: DISCONTINUED | OUTPATIENT
Start: 2022-11-08 | End: 2022-11-10 | Stop reason: HOSPADM

## 2022-11-08 RX ORDER — IBUPROFEN 200 MG
24 TABLET ORAL
Status: DISCONTINUED | OUTPATIENT
Start: 2022-11-08 | End: 2022-11-10 | Stop reason: HOSPADM

## 2022-11-08 RX ORDER — TALC
6 POWDER (GRAM) TOPICAL NIGHTLY PRN
Status: DISCONTINUED | OUTPATIENT
Start: 2022-11-08 | End: 2022-11-10 | Stop reason: HOSPADM

## 2022-11-08 RX ORDER — GLUCAGON 1 MG
1 KIT INJECTION
Status: DISCONTINUED | OUTPATIENT
Start: 2022-11-08 | End: 2022-11-10 | Stop reason: HOSPADM

## 2022-11-08 RX ORDER — MAG HYDROX/ALUMINUM HYD/SIMETH 200-200-20
30 SUSPENSION, ORAL (FINAL DOSE FORM) ORAL 4 TIMES DAILY PRN
Status: DISCONTINUED | OUTPATIENT
Start: 2022-11-08 | End: 2022-11-10 | Stop reason: HOSPADM

## 2022-11-08 RX ORDER — PROCHLORPERAZINE EDISYLATE 5 MG/ML
5 INJECTION INTRAMUSCULAR; INTRAVENOUS EVERY 6 HOURS PRN
Status: DISCONTINUED | OUTPATIENT
Start: 2022-11-08 | End: 2022-11-10 | Stop reason: HOSPADM

## 2022-11-08 RX ORDER — ACETAMINOPHEN 500 MG
1000 TABLET ORAL 2 TIMES DAILY
COMMUNITY
End: 2024-02-15

## 2022-11-08 RX ADMIN — HEPARIN SODIUM 5000 UNITS: 5000 INJECTION INTRAVENOUS; SUBCUTANEOUS at 04:11

## 2022-11-08 RX ADMIN — CHOLECALCIFEROL TAB 25 MCG (1000 UNIT) 1000 UNITS: 25 TAB at 08:11

## 2022-11-08 RX ADMIN — FLUTICASONE PROPIONATE 50 MCG: 50 SPRAY, METERED NASAL at 10:11

## 2022-11-08 RX ADMIN — FUROSEMIDE 20 MG: 20 TABLET ORAL at 04:11

## 2022-11-08 RX ADMIN — PRAVASTATIN SODIUM 40 MG: 40 TABLET ORAL at 08:11

## 2022-11-08 RX ADMIN — ASPIRIN 81 MG: 81 TABLET, COATED ORAL at 08:11

## 2022-11-08 RX ADMIN — HEPARIN SODIUM 5000 UNITS: 5000 INJECTION INTRAVENOUS; SUBCUTANEOUS at 09:11

## 2022-11-08 RX ADMIN — VANCOMYCIN HYDROCHLORIDE 1000 MG: 1 INJECTION, POWDER, LYOPHILIZED, FOR SOLUTION INTRAVENOUS at 12:11

## 2022-11-08 RX ADMIN — ACETAMINOPHEN 500 MG: 500 TABLET ORAL at 12:11

## 2022-11-08 RX ADMIN — PSYLLIUM HUSK 1 PACKET: 3.4 POWDER ORAL at 10:11

## 2022-11-08 RX ADMIN — ACETAMINOPHEN 1000 MG: 500 TABLET ORAL at 08:11

## 2022-11-08 RX ADMIN — SPIRONOLACTONE 25 MG: 25 TABLET, FILM COATED ORAL at 09:11

## 2022-11-08 NOTE — SUBJECTIVE & OBJECTIVE
"Past Medical History:   Diagnosis Date    Amblyopia of left eye 4/10/2013    Anxiety     Arthritis     Facet arthropathy, Lumbosacral    Cataract     Central retinal vein occlusion of left eye     Depression     Diabetic polyneuropathy 2022    Exotropia of both eyes 2013    recession RSR 5.0mm w/ adj; recession LR os 5.0 w/ adj; resect MR os  4.0mm    Hearing loss     History of resection of small bowel     Hypertensive retinopathy of both eyes     Hypoglycemia     Macular degeneration     OA (osteoarthritis) of shoulder     Right    Osteoporosis     Posterior vitreous detachment of both eyes     Rhinitis     TIA (transient ischemic attack)        Past Surgical History:   Procedure Laterality Date    APPENDECTOMY      CARDIAC CATHETERIZATION      CATARACT EXTRACTION W/  INTRAOCULAR LENS IMPLANT Bilateral      SECTION, CLASSIC      CLOSURE OF LEFT ATRIAL APPENDAGE USING DEVICE N/A 2020    Procedure: Left atrial appendage closure device;  Surgeon: Abundio Curtis MD;  Location: [facility] CATH LAB;  Service: Cardiology;  Laterality: N/A;    HYSTERECTOMY      INNER EAR SURGERY      JOINT REPLACEMENT      LEFT KNEE REPLACEMENT IN  -    OOPHORECTOMY      SINUS SURGERY      STRABISMUS SURGERY  13    RSR recession 5 mm, LLR recession 5 mm and LMR resection 4mm    STRABISMUS SURGERY  2014    recess LR OD 6mm    TONSILLECTOMY      watchman surgery N/A 2020       Review of patient's allergies indicates:   Allergen Reactions    Opioids - morphine analogues Other (See Comments)     Bowel issues; bowel obstruction    Tizanidine Other (See Comments)     "Lips were numb,  Almost passed out."    Tramadol Hallucinations    Beta-blockers (beta-adrenergic blocking agts) Other (See Comments)     Can not go on beta blockers for long period of time - due to taking allergy injections    Morphine     Opioids-meperidine and related        No current facility-administered medications on file prior to " encounter.     Current Outpatient Medications on File Prior to Encounter   Medication Sig    aspirin (ECOTRIN) 81 MG EC tablet Take 1 tablet (81 mg total) by mouth once daily.    azelastine (ASTELIN) 137 mcg (0.1 %) nasal spray 1 spray (137 mcg total) by Nasal route 2 (two) times daily.    cholecalciferol, vitamin D3, (VITAMIN D3 ORAL) Take 1 tablet by mouth once daily.    doxycycline (VIBRA-TABS) 100 MG tablet Take 1 tablet (100 mg total) by mouth 2 (two) times daily.    fluticasone propionate (FLONASE) 50 mcg/actuation nasal spray USE 1 SPRAY IN EACH NOSTRIL ONE TIME DAILY    furosemide (LASIX) 20 MG tablet Take 1 tablet (20 mg total) by mouth 2 (two) times daily before meals. Take twice a day for Weight greater than 160 lb, once a day for weights less than 160 lb    hydrOXYzine HCL (ATARAX) 25 MG tablet Take 1 tablet (25 mg total) by mouth 3 (three) times daily as needed for Itching.    miconazole nitrate 2% (MICOTIN) 2 % Oint Apply topically every other day.    mupirocin (BACTROBAN) 2 % ointment Apply topically 3 (three) times daily. Apply locally every other day    pravastatin (PRAVACHOL) 40 MG tablet Take 1 tablet (40 mg total) by mouth once daily.    psyllium 0.52 gram capsule Take 9 g by mouth once daily. Pt takes 3 capsules 3 times a day    spironolactone (ALDACTONE) 25 MG tablet Take twice a day for Weight greater than 160 lb, once a day for weights less than 160 lb    vit C/E/Zn/coppr/lutein/zeaxan (OCUVITE LUTEIN AND ZEAXANTHIN ORAL) Take 1 capsule by mouth once daily. Lutien 25 mg, Zeaxanthin 5 mg    vitamin E 400 UNIT capsule Take 400 Units by mouth once daily.     Family History       Problem Relation (Age of Onset)    Breast cancer Maternal Aunt    Diabetes Sister, Brother    Heart disease Sister, Sister    Hypertension Mother, Father    Liver disease Sister    No Known Problems Maternal Uncle, Paternal Aunt, Paternal Uncle, Maternal Grandmother, Maternal Grandfather, Paternal Grandmother, Paternal  Grandfather, Daughter, Son, Son, Son          Tobacco Use    Smoking status: Former     Types: Cigarettes     Quit date: 10/29/1982     Years since quittin.0     Passive exposure: Never    Smokeless tobacco: Never   Substance and Sexual Activity    Alcohol use: Yes     Alcohol/week: 2.0 standard drinks     Types: 2 Shots of liquor per week     Comment: rare    Drug use: No    Sexual activity: Never     Review of Systems   Constitutional:  Negative for activity change, chills and fever.   HENT:  Positive for hearing loss. Negative for trouble swallowing.    Eyes:  Negative for photophobia and visual disturbance.   Respiratory:  Negative for cough, chest tightness and shortness of breath.    Cardiovascular:  Positive for leg swelling. Negative for chest pain and palpitations.   Gastrointestinal:  Negative for abdominal pain, constipation, diarrhea, nausea and vomiting.   Genitourinary:  Negative for dysuria, frequency and hematuria.   Musculoskeletal:  Positive for arthralgias (L hip). Negative for back pain, gait problem and neck pain.        RLE pain and cramping   Skin:  Positive for wound (RLE). Negative for rash.   Neurological:  Negative for dizziness, syncope, speech difficulty and light-headedness.   Psychiatric/Behavioral:  Negative for agitation and confusion. The patient is not nervous/anxious.    Objective:     Vital Signs (Most Recent):  Temp: 98.1 °F (36.7 °C) (22)  Pulse: 63 (22 0502)  Resp: 18 (22 050)  BP: (!) 123/54 (22 050)  SpO2: 98 % (22)   Vital Signs (24h Range):  Temp:  [98.1 °F (36.7 °C)] 98.1 °F (36.7 °C)  Pulse:  [63-78] 63  Resp:  [18] 18  SpO2:  [96 %-98 %] 98 %  BP: (116-123)/(51-54) 123/54     Weight: 77.6 kg (171 lb)  Body mass index is 28.46 kg/m².    Physical Exam  Vitals and nursing note reviewed.   Constitutional:       General: She is not in acute distress.     Appearance: She is well-developed. She is not ill-appearing or diaphoretic.       Comments: Resting comfortably   HENT:      Head: Normocephalic and atraumatic.      Mouth/Throat:      Pharynx: No oropharyngeal exudate.   Eyes:      Conjunctiva/sclera: Conjunctivae normal.      Pupils: Pupils are equal, round, and reactive to light.   Cardiovascular:      Rate and Rhythm: Normal rate and regular rhythm.      Heart sounds: Normal heart sounds.   Pulmonary:      Effort: Pulmonary effort is normal. No respiratory distress.      Breath sounds: Normal breath sounds. No wheezing.   Abdominal:      General: Bowel sounds are normal. There is no distension.      Palpations: Abdomen is soft.      Tenderness: There is no abdominal tenderness.   Musculoskeletal:         General: Swelling (RLE>LLE) present. No tenderness. Normal range of motion.      Cervical back: Normal range of motion and neck supple.      Right lower leg: Edema (2+up to knee) present.      Left lower leg: Edema (2+) present.   Lymphadenopathy:      Cervical: No cervical adenopathy.   Skin:     General: Skin is warm and dry.      Capillary Refill: Capillary refill takes less than 2 seconds.      Findings: No rash.      Comments: RLE wound; wrapped in clean, dry, and intact dressing on my exam. See picture below from ED provider   Neurological:      General: No focal deficit present.      Mental Status: She is alert and oriented to person, place, and time.      Cranial Nerves: No cranial nerve deficit.      Sensory: No sensory deficit.      Coordination: Coordination normal.   Psychiatric:         Behavior: Behavior normal.         Thought Content: Thought content normal.         Judgment: Judgment normal.               CRANIAL NERVES     CN III, IV, VI   Pupils are equal, round, and reactive to light.     Significant Labs: All pertinent labs within the past 24 hours have been reviewed.  CBC:   Recent Labs   Lab 11/07/22  2318 11/08/22  0501   WBC 4.90 4.00   HGB 10.0* 9.0*   HCT 30.4* 26.2*    193     CMP:   Recent Labs   Lab  11/07/22  2318 11/08/22  0501   * 138   K 4.8 4.4    105   CO2 21* 22*   * 115*   BUN 30* 29*   CREATININE 1.3 1.3   CALCIUM 9.3 8.7   PROT 6.7 6.0   ALBUMIN 3.4* 3.0*   BILITOT 0.6 0.5   ALKPHOS 132 117   AST 15 16   ALT 10 10   ANIONGAP 10 11     Lactic Acid:   Recent Labs   Lab 11/08/22  0028   LACTATE 0.8       Significant Imaging: I have reviewed all pertinent imaging results/findings within the past 24 hours.

## 2022-11-08 NOTE — ASSESSMENT & PLAN NOTE
89 yo F with PMHx of CKD, HFpEF, A fib, Meniere's disease, venous insufficiency, HTN, HLD who presented to ED for worsening RLE cellulitis. Recent admission 1 month ago and treated with IV clindamycin and transitioned to po doxy and cefdinir on discharge. Follows with wound care and ID outpatient.    - SIRS 0/4  - Lactate 0.8  - Given tylenol and IV vanc in ED  - RLE US pending  - ID consulted as patient has failed po and IV abx therapy   - will hold off on abx at this time and defer to ID for recs  - wound care consulted

## 2022-11-08 NOTE — ASSESSMENT & PLAN NOTE
- Hgb 10 -> 9.0 on admit (baseline ~10)  -Obtain type and screen and transfuse if Hb <7 or patient is acutely symptomatic

## 2022-11-08 NOTE — ASSESSMENT & PLAN NOTE
- Patient is identified as having Diastolic (HFpEF) heart failure that is Chronic.   - CHF is currently controlled.   - Latest ECHO performed and demonstrates- Results for orders placed during the hospital encounter of 10/07/22    Echo    Interpretation Summary  · The left ventricle is normal in size with concentric remodeling and normal systolic function.  · The estimated ejection fraction is 55-60%.  · Grade III left ventricular diastolic dysfunction.  · Mild mitral regurgitation.  · Normal right ventricular size with mildly reduced right ventricular systolic function.  · Severe tricuspid regurgitation.  · The estimated PA systolic pressure is 52 mmHg. PASP may be under-estimated due to TR severity.  · Elevated central venous pressure (15 mmHg).  · There is pulmonary hypertension.  · Severe left atrial enlargement.  .   - Continue Furosemide Aldactone and monitor clinical status closely.   - Monitor on telemetry.   - Patient is off CHF pathway.    - Monitor strict Is&Os and daily weights.    - Place on fluid restriction of 1.5 L.   - Continue to stress to patient importance of self efficacy and  on diet for CHF.   - BLE swelling noted on exam. Patient denies SOB and lungs CTA. Swelling likely 2/2 venous insufficiency, but consider BNP if concern for acute exacerbation.

## 2022-11-08 NOTE — HPI
Ms. iJmenez is a 89 year old female with a PMH of CKD, HFpEF, A fib, Meniere's disease, venous insufficiency, HTN, HLD, who presented to ED with worsening RLE wound.     Per chart review, pt initially presented to PCP on 9/20 following potentially scraping her leg with the c/o increased pain. She was started on Augmentin. She unfortunately did not have relief and presented to the ER on 9/25 for similar issue. The ED physician transitioned her to keflex/doxy. A lower extremity ultrasound on 9/25 was obtained and negative for DVT although soft tissue edema in the right popliteal fossa and medial right calf was noted. Pt returned to the ER on 10/7 with similar complaints, persistent RLE wound, increased pain, she was then admitted and placed on IV clindamycin. An xray of the right tib/fib was obtained on 10/7 and no evidence of periostitis or fracture of tib/fib was noted. It was overall an unremarkable study. Pt was seen per ID during the hospital stay and was discharged with 14d course of doxy/cefpodoxime. Vascular surgery consult was also ordered. Pt followed up in ID clinic on 10/20 who requested her abx be extended, which she was give omnicef x7days. She reported qother day wound care as well. She had not seen vascular surgery. Pt was instructed to come to ED per wound care for increased drainage, pain. Pt returned on 11/7 for these reasons.     Since being admitted, pt remains afebrile, no leukocytosis noted. Recent wound culture was obtained from right lower leg on 11/2 + pseudomonas aeruginosa, pan sensitive.     Pt is currently not on abx therapy. ID was consulted for abx recs.

## 2022-11-08 NOTE — SUBJECTIVE & OBJECTIVE
"Past Medical History:   Diagnosis Date    Amblyopia of left eye 4/10/2013    Anxiety     Arthritis     Facet arthropathy, Lumbosacral    Cataract     Central retinal vein occlusion of left eye     Depression     Diabetic polyneuropathy 2022    Exotropia of both eyes 2013    recession RSR 5.0mm w/ adj; recession LR os 5.0 w/ adj; resect MR os  4.0mm    Hearing loss     History of resection of small bowel     Hypertensive retinopathy of both eyes     Hypoglycemia     Macular degeneration     OA (osteoarthritis) of shoulder     Right    Osteoporosis     Posterior vitreous detachment of both eyes     Rhinitis     TIA (transient ischemic attack)        Past Surgical History:   Procedure Laterality Date    APPENDECTOMY      CARDIAC CATHETERIZATION      CATARACT EXTRACTION W/  INTRAOCULAR LENS IMPLANT Bilateral      SECTION, CLASSIC      CLOSURE OF LEFT ATRIAL APPENDAGE USING DEVICE N/A 2020    Procedure: Left atrial appendage closure device;  Surgeon: Abundio Curtis MD;  Location: [facility] CATH LAB;  Service: Cardiology;  Laterality: N/A;    HYSTERECTOMY      INNER EAR SURGERY      JOINT REPLACEMENT      LEFT KNEE REPLACEMENT IN  -    OOPHORECTOMY      SINUS SURGERY      STRABISMUS SURGERY  13    RSR recession 5 mm, LLR recession 5 mm and LMR resection 4mm    STRABISMUS SURGERY  2014    recess LR OD 6mm    TONSILLECTOMY      watchman surgery N/A 2020       Review of patient's allergies indicates:   Allergen Reactions    Opioids - morphine analogues Other (See Comments)     Bowel issues; bowel obstruction    Tizanidine Other (See Comments)     "Lips were numb,  Almost passed out."    Tramadol Hallucinations    Beta-blockers (beta-adrenergic blocking agts) Other (See Comments)     Can not go on beta blockers for long period of time - due to taking allergy injections    Morphine     Opioids-meperidine and related        Medications:  (Not in a hospital admission)    Antibiotics (From " admission, onward)      None          Antifungals (From admission, onward)      None          Antivirals (From admission, onward)      None             Immunization History   Administered Date(s) Administered    COVID-19, MRNA, LN-S, PF (Pfizer) (Gray Cap) 2022    COVID-19, MRNA, LN-S, PF (Pfizer) (Purple Cap) 2021, 2021, 2021    COVID-19, mRNA, LNP-S, bivalent booster, PF (PFIZER OMICRON) 10/14/2022    Influenza (FLUAD) - Quadrivalent - Adjuvanted - PF *Preferred* (65+) 2020, 2022    Influenza - High Dose - PF (65 years and older) 2012, 2013, 10/08/2014, 2015, 10/01/2016, 2017, 2018    Influenza - Quadrivalent 2019, 2019    Influenza - Quadrivalent - High Dose - PF (65 years and older) 2021    Influenza - Trivalent (ADULT) 10/20/2006, 10/03/2007, 10/06/2008, 2009, 2010, 2011    Influenza A (H1N1) 2009 Monovalent - IM - PF 2010    Pneumococcal Conjugate - 13 Valent 2015    Pneumococcal Polysaccharide - 23 Valent 10/20/2001, 10/04/2010    Tdap 2018    Zoster Recombinant 2020, 2020       Family History       Problem Relation (Age of Onset)    Breast cancer Maternal Aunt    Diabetes Sister, Brother    Heart disease Sister, Sister    Hypertension Mother, Father    Liver disease Sister    No Known Problems Maternal Uncle, Paternal Aunt, Paternal Uncle, Maternal Grandmother, Maternal Grandfather, Paternal Grandmother, Paternal Grandfather, Daughter, Son, Son, Son          Social History     Socioeconomic History    Marital status:    Occupational History    Occupation: retired   Tobacco Use    Smoking status: Former     Types: Cigarettes     Quit date: 10/29/1982     Years since quittin.0     Passive exposure: Never    Smokeless tobacco: Never   Substance and Sexual Activity    Alcohol use: Yes     Alcohol/week: 2.0 standard drinks     Types: 2 Shots of liquor per week      Comment: rare    Drug use: No    Sexual activity: Never   Other Topics Concern    Are you pregnant or think you may be? No    Breast-feeding No     Social Determinants of Health     Financial Resource Strain: Low Risk     Difficulty of Paying Living Expenses: Not hard at all   Food Insecurity: No Food Insecurity    Worried About Running Out of Food in the Last Year: Never true    Ran Out of Food in the Last Year: Never true   Transportation Needs: No Transportation Needs    Lack of Transportation (Medical): No    Lack of Transportation (Non-Medical): No   Physical Activity: Inactive    Days of Exercise per Week: 0 days    Minutes of Exercise per Session: 0 min   Stress: Stress Concern Present    Feeling of Stress : To some extent   Social Connections: Socially Isolated    Frequency of Communication with Friends and Family: Once a week    Frequency of Social Gatherings with Friends and Family: Once a week    Attends Voodoo Services: Never    Active Member of Clubs or Organizations: No    Attends Club or Organization Meetings: Never    Marital Status:    Housing Stability: Unknown    Unable to Pay for Housing in the Last Year: No    Unstable Housing in the Last Year: No     Review of Systems   Constitutional:  Negative for chills, diaphoresis, fatigue and fever.   HENT: Negative.     Eyes: Negative.    Respiratory:  Negative for cough and shortness of breath.    Cardiovascular:  Positive for leg swelling. Negative for chest pain and palpitations.   Gastrointestinal:  Negative for abdominal pain, constipation, diarrhea, nausea and vomiting.   Genitourinary:  Negative for difficulty urinating and dysuria.   Musculoskeletal:  Negative for arthralgias, back pain and myalgias.   Skin:  Positive for wound. Negative for color change.   Neurological:  Negative for dizziness, weakness and numbness.   Psychiatric/Behavioral:  Negative for agitation and confusion.    Objective:     Vital Signs (Most Recent):  Temp:  97.7 °F (36.5 °C) (11/08/22 1629)  Pulse: 65 (11/08/22 1629)  Resp: 16 (11/08/22 1629)  BP: (!) 106/53 (11/08/22 1615)  SpO2: 96 % (11/08/22 1629)   Vital Signs (24h Range):  Temp:  [97.4 °F (36.3 °C)-98.1 °F (36.7 °C)] 97.7 °F (36.5 °C)  Pulse:  [63-80] 65  Resp:  [16-18] 16  SpO2:  [96 %-99 %] 96 %  BP: (106-123)/(51-63) 106/53     Weight: 77.6 kg (171 lb)  Body mass index is 28.46 kg/m².    Estimated Creatinine Clearance: 29.6 mL/min (based on SCr of 1.3 mg/dL).    Physical Exam  Vitals and nursing note reviewed.   Constitutional:       General: She is not in acute distress.     Appearance: She is well-developed and normal weight. She is not ill-appearing, toxic-appearing or diaphoretic.   HENT:      Head: Normocephalic and atraumatic.      Nose: Nose normal.      Mouth/Throat:      Mouth: Mucous membranes are moist.      Dentition: Normal dentition. Does not have dentures. No dental caries or dental abscesses.      Pharynx: Oropharynx is clear. No oropharyngeal exudate.   Eyes:      General: No scleral icterus.     Conjunctiva/sclera: Conjunctivae normal.   Cardiovascular:      Rate and Rhythm: Normal rate and regular rhythm.      Heart sounds: Normal heart sounds. No murmur heard.  Pulmonary:      Effort: Pulmonary effort is normal. No respiratory distress.      Breath sounds: Normal breath sounds.   Abdominal:      General: Bowel sounds are normal. There is no distension.      Palpations: Abdomen is soft.      Tenderness: There is no abdominal tenderness.   Musculoskeletal:         General: Normal range of motion.      Cervical back: Normal range of motion.      Right lower leg: Edema present.      Left lower leg: Edema present.   Lymphadenopathy:      Cervical: No cervical adenopathy.   Skin:     General: Skin is warm and dry.      Findings: No erythema or rash.      Comments: Right lower leg venous stasis wound. No swelling, periwound redness noted. No purulent drainage noted. Mild warmth noted to  surrounding area.    Neurological:      General: No focal deficit present.      Mental Status: She is alert and oriented to person, place, and time. Mental status is at baseline.      Motor: No weakness.      Gait: Gait normal.   Psychiatric:         Mood and Affect: Mood normal.         Behavior: Behavior normal.         Thought Content: Thought content normal.         Judgment: Judgment normal.       Significant Labs: All pertinent labs within the past 24 hours have been reviewed.    Significant Imaging: I have reviewed all pertinent imaging results/findings within the past 24 hours.

## 2022-11-08 NOTE — PLAN OF CARE
Hospital Medicine Plan of Care Note    Admission H&P dated earlier this morning reviewed, and agree with assessment and plan as documented. Pt seen and examined this morning on rounds, NAEON.     Pain well-controlled. Awaiting ID consult for abx recommendations. VSS, labs reviewed. Continuing current plan, further dispo pending.      Shilo Swift MD  Attending Physician  Department of Hospital Medicine  Epic secure chat preferred, or ext. [phone]  11/8/2022

## 2022-11-08 NOTE — H&P
[facility]  [facility]  History & Physical    Patient Name: Jenniffer Jimenez  MRN: [id number]  Patient Class: OP- Observation  Admission Date: 11/7/2022  Attending Physician: Shilo Swift MD   Primary Care Provider: Rubi Young DO         Patient information was obtained from patient and ER records.     Subjective:     Principal Problem:Cellulitis of right lower extremity    Chief Complaint:   Chief Complaint   Patient presents with    Wound Infection     Pt has chronic wound taking up entire front part of R shin. Pt followed by wound care RN - wound was bandaged today and completely saturated 3 hours later. Denies any other sx besides R leg pain.         HPI: Jenniffer Jimenez is a 89 yo F with PMHx of CKD, HFpEF, A fib, Meniere's disease, venous insufficiency, HTN, HLD who presented to ED for worsening RLE cellulitis. Wound was first noticed 2 months ago, for which she was then hospitalized at [facility] one month ago after failing po abx therapy (Augmentin -> keflex/doxy).  She was placed on IV clindamycin during that admission and then transitioned to po doxy and cefdinir on discharge. Since discharge, she has received OP wound care and follows with OP ID. Patient receives wound care every other day at home and was told by wound care nurse to go to ED for increased redness and serous drainage today. Wound was initially not painful, but she now admits soreness and cramping to RLE. Also admits L hip pain. Of note, patient has become increasingly dependent on walker for ambulation over the past couple of months. Patient denies fever, chills, chest pain, SOB, cough, abdominal pain, n/v/d, weakness.    In ED: Afebrile. VSS. Hgb 10.0 ->9.0. Lactate 0.8. Given tylenol and IV vanc.      Past Medical History:   Diagnosis Date    Amblyopia of left eye 4/10/2013    Anxiety     Arthritis     Facet arthropathy, Lumbosacral    Cataract     Central retinal vein occlusion of left eye     Depression   "   Diabetic polyneuropathy 2022    Exotropia of both eyes 2013    recession RSR 5.0mm w/ adj; recession LR os 5.0 w/ adj; resect MR os  4.0mm    Hearing loss     History of resection of small bowel     Hypertensive retinopathy of both eyes     Hypoglycemia     Macular degeneration     OA (osteoarthritis) of shoulder     Right    Osteoporosis     Posterior vitreous detachment of both eyes     Rhinitis     TIA (transient ischemic attack)        Past Surgical History:   Procedure Laterality Date    APPENDECTOMY      CARDIAC CATHETERIZATION      CATARACT EXTRACTION W/  INTRAOCULAR LENS IMPLANT Bilateral      SECTION, CLASSIC      CLOSURE OF LEFT ATRIAL APPENDAGE USING DEVICE N/A 2020    Procedure: Left atrial appendage closure device;  Surgeon: Abundio Curtis MD;  Location: [facility] CATH LAB;  Service: Cardiology;  Laterality: N/A;    HYSTERECTOMY      INNER EAR SURGERY      JOINT REPLACEMENT      LEFT KNEE REPLACEMENT IN  -    OOPHORECTOMY      SINUS SURGERY      STRABISMUS SURGERY  13    RSR recession 5 mm, LLR recession 5 mm and LMR resection 4mm    STRABISMUS SURGERY  2014    recess LR OD 6mm    TONSILLECTOMY      watchman surgery N/A 2020       Review of patient's allergies indicates:   Allergen Reactions    Opioids - morphine analogues Other (See Comments)     Bowel issues; bowel obstruction    Tizanidine Other (See Comments)     "Lips were numb,  Almost passed out."    Tramadol Hallucinations    Beta-blockers (beta-adrenergic blocking agts) Other (See Comments)     Can not go on beta blockers for long period of time - due to taking allergy injections    Morphine     Opioids-meperidine and related        No current facility-administered medications on file prior to encounter.     Current Outpatient Medications on File Prior to Encounter   Medication Sig    aspirin (ECOTRIN) 81 MG EC tablet Take 1 tablet (81 mg total) by mouth once daily.    azelastine (ASTELIN) 137 mcg (0.1 %) " nasal spray 1 spray (137 mcg total) by Nasal route 2 (two) times daily.    cholecalciferol, vitamin D3, (VITAMIN D3 ORAL) Take 1 tablet by mouth once daily.    doxycycline (VIBRA-TABS) 100 MG tablet Take 1 tablet (100 mg total) by mouth 2 (two) times daily.    fluticasone propionate (FLONASE) 50 mcg/actuation nasal spray USE 1 SPRAY IN EACH NOSTRIL ONE TIME DAILY    furosemide (LASIX) 20 MG tablet Take 1 tablet (20 mg total) by mouth 2 (two) times daily before meals. Take twice a day for Weight greater than 160 lb, once a day for weights less than 160 lb    hydrOXYzine HCL (ATARAX) 25 MG tablet Take 1 tablet (25 mg total) by mouth 3 (three) times daily as needed for Itching.    miconazole nitrate 2% (MICOTIN) 2 % Oint Apply topically every other day.    mupirocin (BACTROBAN) 2 % ointment Apply topically 3 (three) times daily. Apply locally every other day    pravastatin (PRAVACHOL) 40 MG tablet Take 1 tablet (40 mg total) by mouth once daily.    psyllium 0.52 gram capsule Take 9 g by mouth once daily. Pt takes 3 capsules 3 times a day    spironolactone (ALDACTONE) 25 MG tablet Take twice a day for Weight greater than 160 lb, once a day for weights less than 160 lb    vit C/E/Zn/coppr/lutein/zeaxan (OCUVITE LUTEIN AND ZEAXANTHIN ORAL) Take 1 capsule by mouth once daily. Lutien 25 mg, Zeaxanthin 5 mg    vitamin E 400 UNIT capsule Take 400 Units by mouth once daily.     Family History       Problem Relation (Age of Onset)    Breast cancer Maternal Aunt    Diabetes Sister, Brother    Heart disease Sister, Sister    Hypertension Mother, Father    Liver disease Sister    No Known Problems Maternal Uncle, Paternal Aunt, Paternal Uncle, Maternal Grandmother, Maternal Grandfather, Paternal Grandmother, Paternal Grandfather, Daughter, Son, Son, Son          Tobacco Use    Smoking status: Former     Types: Cigarettes     Quit date: 10/29/1982     Years since quittin.0     Passive exposure: Never    Smokeless tobacco:  Never   Substance and Sexual Activity    Alcohol use: Yes     Alcohol/week: 2.0 standard drinks     Types: 2 Shots of liquor per week     Comment: rare    Drug use: No    Sexual activity: Never     Review of Systems   Constitutional:  Negative for activity change, chills and fever.   HENT:  Positive for hearing loss. Negative for trouble swallowing.    Eyes:  Negative for photophobia and visual disturbance.   Respiratory:  Negative for cough, chest tightness and shortness of breath.    Cardiovascular:  Positive for leg swelling. Negative for chest pain and palpitations.   Gastrointestinal:  Negative for abdominal pain, constipation, diarrhea, nausea and vomiting.   Genitourinary:  Negative for dysuria, frequency and hematuria.   Musculoskeletal:  Positive for arthralgias (L hip). Negative for back pain, gait problem and neck pain.        RLE pain and cramping   Skin:  Positive for wound (RLE). Negative for rash.   Neurological:  Negative for dizziness, syncope, speech difficulty and light-headedness.   Psychiatric/Behavioral:  Negative for agitation and confusion. The patient is not nervous/anxious.    Objective:     Vital Signs (Most Recent):  Temp: 98.1 °F (36.7 °C) (11/07/22 2129)  Pulse: 63 (11/08/22 0502)  Resp: 18 (11/08/22 0502)  BP: (!) 123/54 (11/08/22 0502)  SpO2: 98 % (11/08/22 0502)   Vital Signs (24h Range):  Temp:  [98.1 °F (36.7 °C)] 98.1 °F (36.7 °C)  Pulse:  [63-78] 63  Resp:  [18] 18  SpO2:  [96 %-98 %] 98 %  BP: (116-123)/(51-54) 123/54     Weight: 77.6 kg (171 lb)  Body mass index is 28.46 kg/m².    Physical Exam  Vitals and nursing note reviewed.   Constitutional:       General: She is not in acute distress.     Appearance: She is well-developed. She is not ill-appearing or diaphoretic.      Comments: Resting comfortably   HENT:      Head: Normocephalic and atraumatic.      Mouth/Throat:      Pharynx: No oropharyngeal exudate.   Eyes:      Conjunctiva/sclera: Conjunctivae normal.      Pupils:  Pupils are equal, round, and reactive to light.   Cardiovascular:      Rate and Rhythm: Normal rate and regular rhythm.      Heart sounds: Normal heart sounds.   Pulmonary:      Effort: Pulmonary effort is normal. No respiratory distress.      Breath sounds: Normal breath sounds. No wheezing.   Abdominal:      General: Bowel sounds are normal. There is no distension.      Palpations: Abdomen is soft.      Tenderness: There is no abdominal tenderness.   Musculoskeletal:         General: Swelling (RLE>LLE) present. No tenderness. Normal range of motion.      Cervical back: Normal range of motion and neck supple.      Right lower leg: Edema (2+up to knee) present.      Left lower leg: Edema (2+) present.   Lymphadenopathy:      Cervical: No cervical adenopathy.   Skin:     General: Skin is warm and dry.      Capillary Refill: Capillary refill takes less than 2 seconds.      Findings: No rash.      Comments: RLE wound; wrapped in clean, dry, and intact dressing on my exam. See picture below from ED provider   Neurological:      General: No focal deficit present.      Mental Status: She is alert and oriented to person, place, and time.      Cranial Nerves: No cranial nerve deficit.      Sensory: No sensory deficit.      Coordination: Coordination normal.   Psychiatric:         Behavior: Behavior normal.         Thought Content: Thought content normal.         Judgment: Judgment normal.               CRANIAL NERVES     CN III, IV, VI   Pupils are equal, round, and reactive to light.     Significant Labs: All pertinent labs within the past 24 hours have been reviewed.  CBC:   Recent Labs   Lab 11/07/22 2318 11/08/22  0501   WBC 4.90 4.00   HGB 10.0* 9.0*   HCT 30.4* 26.2*    193     CMP:   Recent Labs   Lab 11/07/22 2318 11/08/22  0501   * 138   K 4.8 4.4    105   CO2 21* 22*   * 115*   BUN 30* 29*   CREATININE 1.3 1.3   CALCIUM 9.3 8.7   PROT 6.7 6.0   ALBUMIN 3.4* 3.0*   BILITOT 0.6 0.5    ALKPHOS 132 117   AST 15 16   ALT 10 10   ANIONGAP 10 11     Lactic Acid:   Recent Labs   Lab 11/08/22  0028   LACTATE 0.8       Significant Imaging: I have reviewed all pertinent imaging results/findings within the past 24 hours.    Assessment/Plan:     * Cellulitis of right lower extremity  89 yo F with PMHx of CKD, HFpEF, A fib, Meniere's disease, venous insufficiency, HTN, HLD who presented to ED for worsening RLE cellulitis. Recent admission 1 month ago and treated with IV clindamycin and transitioned to po doxy and cefdinir on discharge. Follows with wound care and ID outpatient.    - SIRS 0/4  - Lactate 0.8  - Given tylenol and IV vanc in ED  - RLE US pending  - recent wound cx performed on 11/02 with pseudomonas aeruginosa  - ID consulted as patient has failed po and IV abx therapy   - will hold off on abx at this time and defer to ID for recs  - wound care consulted    Venous insufficiency of both lower extremities  - [facility]    Chronic diastolic heart failure  - Patient is identified as having Diastolic (HFpEF) heart failure that is Chronic.   - CHF is currently controlled.   - Latest ECHO performed and demonstrates- Results for orders placed during the hospital encounter of 10/07/22    Echo    Interpretation Summary  · The left ventricle is normal in size with concentric remodeling and normal systolic function.  · The estimated ejection fraction is 55-60%.  · Grade III left ventricular diastolic dysfunction.  · Mild mitral regurgitation.  · Normal right ventricular size with mildly reduced right ventricular systolic function.  · Severe tricuspid regurgitation.  · The estimated PA systolic pressure is 52 mmHg. PASP may be under-estimated due to TR severity.  · Elevated central venous pressure (15 mmHg).  · There is pulmonary hypertension.  · Severe left atrial enlargement.  .   - Continue Furosemide Aldactone and monitor clinical status closely.   - Monitor  on telemetry.   - Patient is off CHF pathway.    - Monitor strict Is&Os and daily weights.    - Place on fluid restriction of 1.5 L.   - Continue to stress to patient importance of self efficacy and  on diet for CHF.   - BLE swelling noted on exam. Patient denies SOB and lungs CTA. Swelling likely 2/2 venous insufficiency, but consider BNP if concern for acute exacerbation.    Normocytic anemia  - Hgb 10 -> 9.0 on admit (baseline ~10)  -Obtain type and screen and transfuse if Hb <7 or patient is acutely symptomatic    Stage 3b chronic kidney disease  - Cr 1.3 on admit  - renally dose meds  - avoid nephrotoxic meds  - daily bmp    Chronic atrial fibrillation  - s/p Watchman  - EKG pending  - continue home ASA    Meniere's disease  - appointment with allergy/immunology scheduled for today; will need to be rescheduled    Primary hypertension  - controlled on admit  - continue home lasix    Pure hypercholesterolemia  - continue home statin    VTE Risk Mitigation (From admission, onward)           Ordered     heparin (porcine) injection 5,000 Units  Every 8 hours         11/08/22 0114     IP VTE HIGH RISK PATIENT  Once         11/08/22 0114                       Lana Miramontes PA-C  Department of Hospital Medicine   [facility] Dept

## 2022-11-08 NOTE — NURSING
Report called to [facility]. Patient alert and oriented. Room air. Blood sugar 125 at dinner time. Continent times two. Ambulates with rolling walker. Tele box 1903 heart rate 68 and Normal Sinus  rhythm.

## 2022-11-08 NOTE — CONSULTS
[facility] Emergency Dept  Infectious Disease  Consult Note    Patient Name: Jenniffer Jimenez  MRN: [id number]  Admission Date: 11/7/2022  Hospital Length of Stay: 0 days  Attending Physician: Shilo Swift MD  Primary Care Provider: Rubi Young DO     Isolation Status: No active isolations    Patient information was obtained from patient, past medical records and ER records.      Inpatient consult to Infectious Diseases  Consult performed by: Joseph Rees NP  Consult ordered by: Lana Miramontes PA-C        Assessment/Plan:     Non-healing wound of right lower extremity   89 year old female with a PMH of CKD, HFpEF, A fib, Meniere's disease, venous insufficiency, HTN, HLD, who presented to ED with worsening RLE wound.     Abx history:   9/20 - Augmentin   9/25- doxy/keflex   10/7 - IV clinda and was transitioned to doxy/cefpodoxime   10/20 - Cefdinir x7d    Imaging history:   9/25 - lower extremity ultrasound - No DVT bilaterally   10/7- tib/fib xray of right leg - no evidence of periostitis or fracture of tib/fib was noted. It was overall an unremarkable study.  11/8- lower extremity ultrasound - no DVT bilaterally      Since being admitted, pt remains afebrile, no leukocytosis noted. Recent wound culture was obtained from right lower leg on 11/2 + pseudomonas aeruginosa, pan sensitive.      Pt is currently not on abx therapy. ID was consulted for abx recs.     Recommendations:   - Wound does not appear infectious from ID standpoint. Culture obtained from 11/2 was surface culture and most likely d/t colonization. Do not recommend further abx at this time.   - Rec frequent leg elevation and compression to lower extremities.   - Rec Vascular surgery consult to further evaluate nonhealing wound.   - Rec to continue freq wound care to promote wound healing and prevent infections in the future.   - Instructed pt to call or seek immediate medical attention for any fevers, chills, sweats, diarrhea, n/v or  increase in pain, swelling, purulent/foul smelling drainage from wound.   - Pt seen and plan reviewed with Dr. Zuñiga. ID will sign off.           Thank you for your consult. I will sign off. Please contact us if you have any additional questions.    Joseph Diaz NP  Infectious Disease  [facility]    Subjective:     Principal Problem: Cellulitis of right lower extremity    HPI: Ms. Jimenez is a 89 year old female with a PMH of CKD, HFpEF, A fib, Meniere's disease, venous insufficiency, HTN, HLD, who presented to ED with worsening RLE wound.     Per chart review, pt initially presented to PCP on 9/20 following potentially scraping her leg with the c/o increased pain. She was started on Augmentin. She unfortunately did not have relief and presented to the ER on 9/25 for similar issue. The ED physician transitioned her to keflex/doxy. A lower extremity ultrasound on 9/25 was obtained and negative for DVT although soft tissue edema in the right popliteal fossa and medial right calf was noted. Pt returned to the ER on 10/7 with similar complaints, persistent RLE wound, increased pain, she was then admitted and placed on IV clindamycin. An xray of the right tib/fib was obtained on 10/7 and no evidence of periostitis or fracture of tib/fib was noted. It was overall an unremarkable study. Pt was seen per ID during the hospital stay and was discharged with 14d course of doxy/cefpodoxime. Vascular surgery consult was also ordered. Pt followed up in ID clinic on 10/20 who requested her abx be extended, which she was give omnicef x7days. She reported qother day wound care as well. She had not seen vascular surgery. Pt was instructed to come to ED per wound care for increased drainage, pain. Pt returned on 11/7 for these reasons.     Since being admitted, pt remains afebrile, no leukocytosis noted. Recent wound culture was obtained from right lower leg on 11/2 + pseudomonas aeruginosa, pan sensitive.     Pt is  "currently not on abx therapy. ID was consulted for abx recs.       Past Medical History:   Diagnosis Date    Amblyopia of left eye 4/10/2013    Anxiety     Arthritis     Facet arthropathy, Lumbosacral    Cataract     Central retinal vein occlusion of left eye     Depression     Diabetic polyneuropathy 2022    Exotropia of both eyes 2013    recession RSR 5.0mm w/ adj; recession LR os 5.0 w/ adj; resect MR os  4.0mm    Hearing loss     History of resection of small bowel     Hypertensive retinopathy of both eyes     Hypoglycemia     Macular degeneration     OA (osteoarthritis) of shoulder     Right    Osteoporosis     Posterior vitreous detachment of both eyes     Rhinitis     TIA (transient ischemic attack)        Past Surgical History:   Procedure Laterality Date    APPENDECTOMY      CARDIAC CATHETERIZATION      CATARACT EXTRACTION W/  INTRAOCULAR LENS IMPLANT Bilateral      SECTION, CLASSIC      CLOSURE OF LEFT ATRIAL APPENDAGE USING DEVICE N/A 2020    Procedure: Left atrial appendage closure device;  Surgeon: Abundio Curtis MD;  Location: [facility] CATH LAB;  Service: Cardiology;  Laterality: N/A;    HYSTERECTOMY      INNER EAR SURGERY      JOINT REPLACEMENT      LEFT KNEE REPLACEMENT IN  -    OOPHORECTOMY      SINUS SURGERY      STRABISMUS SURGERY  13    RSR recession 5 mm, LLR recession 5 mm and LMR resection 4mm    STRABISMUS SURGERY  2014    recess LR OD 6mm    TONSILLECTOMY      watchman surgery N/A 2020       Review of patient's allergies indicates:   Allergen Reactions    Opioids - morphine analogues Other (See Comments)     Bowel issues; bowel obstruction    Tizanidine Other (See Comments)     "Lips were numb,  Almost passed out."    Tramadol Hallucinations    Beta-blockers (beta-adrenergic blocking agts) Other (See Comments)     Can not go on beta blockers for long period of time - due to taking allergy injections    Morphine     " Opioids-meperidine and related        Medications:  (Not in a hospital admission)    Antibiotics (From admission, onward)      None          Antifungals (From admission, onward)      None          Antivirals (From admission, onward)      None             Immunization History   Administered Date(s) Administered    COVID-19, MRNA, LN-S, PF (Pfizer) (Gray Cap) 2022    COVID-19, MRNA, LN-S, PF (Pfizer) (Purple Cap) 2021, 2021, 2021    COVID-19, mRNA, LNP-S, bivalent booster, PF (PFIZER OMICRON) 10/14/2022    Influenza (FLUAD) - Quadrivalent - Adjuvanted - PF *Preferred* (65+) 2020, 2022    Influenza - High Dose - PF (65 years and older) 2012, 2013, 10/08/2014, 2015, 10/01/2016, 2017, 2018    Influenza - Quadrivalent 2019, 2019    Influenza - Quadrivalent - High Dose - PF (65 years and older) 2021    Influenza - Trivalent (ADULT) 10/20/2006, 10/03/2007, 10/06/2008, 2009, 2010, 2011    Influenza A (H1N1) 2009 Monovalent - IM - PF 2010    Pneumococcal Conjugate - 13 Valent 2015    Pneumococcal Polysaccharide - 23 Valent 10/20/2001, 10/04/2010    Tdap 2018    Zoster Recombinant 2020, 2020       Family History       Problem Relation (Age of Onset)    Breast cancer Maternal Aunt    Diabetes Sister, Brother    Heart disease Sister, Sister    Hypertension Mother, Father    Liver disease Sister    No Known Problems Maternal Uncle, Paternal Aunt, Paternal Uncle, Maternal Grandmother, Maternal Grandfather, Paternal Grandmother, Paternal Grandfather, Daughter, Son, Son, Son          Social History     Socioeconomic History    Marital status:    Occupational History    Occupation: retired   Tobacco Use    Smoking status: Former     Types: Cigarettes     Quit date: 10/29/1982     Years since quittin.0     Passive exposure: Never    Smokeless tobacco: Never   Substance and  Sexual Activity    Alcohol use: Yes     Alcohol/week: 2.0 standard drinks     Types: 2 Shots of liquor per week     Comment: rare    Drug use: No    Sexual activity: Never   Other Topics Concern    Are you pregnant or think you may be? No    Breast-feeding No     Social Determinants of Health     Financial Resource Strain: Low Risk     Difficulty of Paying Living Expenses: Not hard at all   Food Insecurity: No Food Insecurity    Worried About Running Out of Food in the Last Year: Never true    Ran Out of Food in the Last Year: Never true   Transportation Needs: No Transportation Needs    Lack of Transportation (Medical): No    Lack of Transportation (Non-Medical): No   Physical Activity: Inactive    Days of Exercise per Week: 0 days    Minutes of Exercise per Session: 0 min   Stress: Stress Concern Present    Feeling of Stress : To some extent   Social Connections: Socially Isolated    Frequency of Communication with Friends and Family: Once a week    Frequency of Social Gatherings with Friends and Family: Once a week    Attends [employer] Services: Never    Active Member of Clubs or Organizations: No    Attends Club or Organization Meetings: Never    Marital Status:    Housing Stability: Unknown    Unable to Pay for Housing in the Last Year: No    Unstable Housing in the Last Year: No     Review of Systems   Constitutional:  Negative for chills, diaphoresis, fatigue and fever.   HENT: Negative.     Eyes: Negative.    Respiratory:  Negative for cough and shortness of breath.    Cardiovascular:  Positive for leg swelling. Negative for chest pain and palpitations.   Gastrointestinal:  Negative for abdominal pain, constipation, diarrhea, nausea and vomiting.   Genitourinary:  Negative for difficulty urinating and dysuria.   Musculoskeletal:  Negative for arthralgias, back pain and myalgias.   Skin:  Positive for wound. Negative for color change.   Neurological:  Negative for dizziness,  weakness and numbness.   Psychiatric/Behavioral:  Negative for agitation and confusion.    Objective:     Vital Signs (Most Recent):  Temp: 97.7 °F (36.5 °C) (11/08/22 1629)  Pulse: 65 (11/08/22 1629)  Resp: 16 (11/08/22 1629)  BP: (!) 106/53 (11/08/22 1615)  SpO2: 96 % (11/08/22 1629)   Vital Signs (24h Range):  Temp:  [97.4 °F (36.3 °C)-98.1 °F (36.7 °C)] 97.7 °F (36.5 °C)  Pulse:  [63-80] 65  Resp:  [16-18] 16  SpO2:  [96 %-99 %] 96 %  BP: (106-123)/(51-63) 106/53     Weight: 77.6 kg (171 lb)  Body mass index is 28.46 kg/m².    Estimated Creatinine Clearance: 29.6 mL/min (based on SCr of 1.3 mg/dL).    Physical Exam  Vitals and nursing note reviewed.   Constitutional:       General: She is not in acute distress.     Appearance: She is well-developed and normal weight. She is not ill-appearing, toxic-appearing or diaphoretic.   HENT:      Head: Normocephalic and atraumatic.      Nose: Nose normal.      Mouth/Throat:      Mouth: Mucous membranes are moist.      Dentition: Normal dentition. Does not have dentures. No dental caries or dental abscesses.      Pharynx: Oropharynx is clear. No oropharyngeal exudate.   Eyes:      General: No scleral icterus.     Conjunctiva/sclera: Conjunctivae normal.   Cardiovascular:      Rate and Rhythm: Normal rate and regular rhythm.      Heart sounds: Normal heart sounds. No murmur heard.  Pulmonary:      Effort: Pulmonary effort is normal. No respiratory distress.      Breath sounds: Normal breath sounds.   Abdominal:      General: Bowel sounds are normal. There is no distension.      Palpations: Abdomen is soft.      Tenderness: There is no abdominal tenderness.   Musculoskeletal:         General: Normal range of motion.      Cervical back: Normal range of motion.      Right lower leg: Edema present.      Left lower leg: Edema present.   Lymphadenopathy:      Cervical: No cervical adenopathy.   Skin:     General: Skin is warm and dry.      Findings: No erythema or rash.       Comments: Right lower leg venous stasis wound. No swelling, periwound redness noted. No purulent drainage noted. Mild warmth noted to surrounding area.    Neurological:      General: No focal deficit present.      Mental Status: She is alert and oriented to person, place, and time. Mental status is at baseline.      Motor: No weakness.      Gait: Gait normal.   Psychiatric:         Mood and Affect: Mood normal.         Behavior: Behavior normal.         Thought Content: Thought content normal.         Judgment: Judgment normal.       Significant Labs: All pertinent labs within the past 24 hours have been reviewed.    Significant Imaging: I have reviewed all pertinent imaging results/findings within the past 24 hours.

## 2022-11-08 NOTE — ASSESSMENT & PLAN NOTE
89 year old female with a PMH of CKD, HFpEF, A fib, Meniere's disease, venous insufficiency, HTN, HLD, who presented to ED with worsening RLE wound.     Abx history:   9/20 - Augmentin   9/25- doxy/keflex   10/7 - IV clinda and was transitioned to doxy/cefpodoxime   10/20 - Cefdinir x7d    Imaging history:   9/25 - lower extremity ultrasound - No DVT bilaterally   10/7- tib/fib xray of right leg - no evidence of periostitis or fracture of tib/fib was noted. It was overall an unremarkable study.  11/8- lower extremity ultrasound - no DVT bilaterally      Since being admitted, pt remains afebrile, no leukocytosis noted. Recent wound culture was obtained from right lower leg on 11/2 + pseudomonas aeruginosa, pan sensitive.      Pt is currently not on abx therapy. ID was consulted for abx recs.     Recommendations:   - Wound does not appear infectious from ID standpoint. Culture obtained from 11/2 was surface culture and most likely d/t colonization. Do not recommend further abx at this time.   - Rec frequent leg elevation and compression to lower extremities.   - Rec Vascular surgery consult to further evaluate nonhealing wound.   - Rec to continue freq wound care to promote wound healing and prevent infections in the future.   - Instructed pt to call or seek immediate medical attention for any fevers, chills, sweats, diarrhea, n/v or increase in pain, swelling, purulent/foul smelling drainage from wound.   - Pt seen and plan reviewed with Dr. Zuñiga. ID will sign off.

## 2022-11-08 NOTE — ED TRIAGE NOTES
"Jenniffer Jimenez, a 89 y.o. female presents to the ED w/ complaint of wound to R shin, chronic wound with recurring cellulitis, states wound has been draining more than usual and concerned for infection.     Triage note:  Chief Complaint   Patient presents with    Wound Infection     Pt has chronic wound taking up entire front part of R shin. Pt followed by wound care RN - wound was bandaged today and completely saturated 3 hours later. Denies any other sx besides R leg pain.      Review of patient's allergies indicates:   Allergen Reactions    Opioids - morphine analogues Other (See Comments)     Bowel issues; bowel obstruction    Tizanidine Other (See Comments)     "Lips were numb,  Almost passed out."    Tramadol Hallucinations    Beta-blockers (beta-adrenergic blocking agts) Other (See Comments)     Can not go on beta blockers for long period of time - due to taking allergy injections    Morphine     Opioids-meperidine and related      Past Medical History:   Diagnosis Date    Amblyopia of left eye 4/10/2013    Anxiety     Arthritis     Facet arthropathy, Lumbosacral    Cataract     Central retinal vein occlusion of left eye     Depression     Diabetic polyneuropathy 1/6/2022    Exotropia of both eyes 2/6/2013    recession RSR 5.0mm w/ adj; recession LR os 5.0 w/ adj; resect MR os  4.0mm    Hearing loss     History of resection of small bowel     Hypertensive retinopathy of both eyes     Hypoglycemia     Macular degeneration     OA (osteoarthritis) of shoulder     Right    Osteoporosis     Posterior vitreous detachment of both eyes     Rhinitis     TIA (transient ischemic attack)       "

## 2022-11-08 NOTE — ED PROVIDER NOTES
"Encounter Date: 11/7/2022       History     Chief Complaint   Patient presents with    Wound Infection     Pt has chronic wound taking up entire front part of R shin. Pt followed by wound care RN - wound was bandaged today and completely saturated 3 hours later. Denies any other sx besides R leg pain.      HPI    Patient is a 89-year-old female with a history of peripheral edema, diastolic heart failure, chronic kidney disease, venous insufficiency with chronic right lower extremity wound for which she is followed by home wound care who was referred to the emergency department after wound care nurse noted increased drainage and concern for spread of erythema.  The patient noted increased clear serous drainage today and also increased redness and pain in the right lower extremity with no preceding trauma.  Patient denies any fevers chills, no nausea vomiting, no additional symptoms.    Review of patient's allergies indicates:   Allergen Reactions    Opioids - morphine analogues Other (See Comments)     Bowel issues; bowel obstruction    Tizanidine Other (See Comments)     "Lips were numb,  Almost passed out."    Tramadol Hallucinations    Beta-blockers (beta-adrenergic blocking agts) Other (See Comments)     Can not go on beta blockers for long period of time - due to taking allergy injections    Morphine     Opioids-meperidine and related      Past Medical History:   Diagnosis Date    Amblyopia of left eye 4/10/2013    Anxiety     Arthritis     Facet arthropathy, Lumbosacral    Cataract     Central retinal vein occlusion of left eye     Depression     Diabetic polyneuropathy 1/6/2022    Exotropia of both eyes 2/6/2013    recession RSR 5.0mm w/ adj; recession LR os 5.0 w/ adj; resect MR os  4.0mm    Hearing loss     History of resection of small bowel     Hypertensive retinopathy of both eyes     Hypoglycemia     Macular degeneration     OA (osteoarthritis) of shoulder     Right    Osteoporosis     Posterior vitreous " detachment of both eyes     Rhinitis     TIA (transient ischemic attack)      Past Surgical History:   Procedure Laterality Date    APPENDECTOMY      CARDIAC CATHETERIZATION      CATARACT EXTRACTION W/  INTRAOCULAR LENS IMPLANT Bilateral      SECTION, CLASSIC      CLOSURE OF LEFT ATRIAL APPENDAGE USING DEVICE N/A 2020    Procedure: Left atrial appendage closure device;  Surgeon: Abundio Curtis MD;  Location: [facility] CATH LAB;  Service: Cardiology;  Laterality: N/A;    HYSTERECTOMY      INNER EAR SURGERY      JOINT REPLACEMENT      LEFT KNEE REPLACEMENT IN 2013 -    OOPHORECTOMY      SINUS SURGERY      STRABISMUS SURGERY  13    RSR recession 5 mm, LLR recession 5 mm and LMR resection 4mm    STRABISMUS SURGERY  2014    recess LR OD 6mm    TONSILLECTOMY      watchman surgery N/A 2020     Family History   Problem Relation Age of Onset    Hypertension Mother     Hypertension Father     Liver disease Sister     Diabetes Sister     Heart disease Sister     Diabetes Brother     Breast cancer Maternal Aunt     No Known Problems Maternal Uncle     No Known Problems Paternal Aunt     No Known Problems Paternal Uncle     No Known Problems Maternal Grandmother     No Known Problems Maternal Grandfather     No Known Problems Paternal Grandmother     No Known Problems Paternal Grandfather     No Known Problems Daughter     No Known Problems Son     Heart disease Sister     No Known Problems Son     No Known Problems Son     Cancer Neg Hx         in first degree relatives    Melanoma Neg Hx     Psoriasis Neg Hx     Lupus Neg Hx     Amblyopia Neg Hx     Blindness Neg Hx     Cataracts Neg Hx     Glaucoma Neg Hx     Macular degeneration Neg Hx     Retinal detachment Neg Hx     Strabismus Neg Hx     Stroke Neg Hx     Thyroid disease Neg Hx      Social History     Tobacco Use    Smoking status: Former     Types: Cigarettes     Quit date: 10/29/1982     Years since quittin.0     Passive exposure: Never     Smokeless tobacco: Never   Substance Use Topics    Alcohol use: Yes     Alcohol/week: 2.0 standard drinks     Types: 2 Shots of liquor per week     Comment: rare    Drug use: No     Review of Systems  Review of Systems   Constitutional:  Negative for chills, diaphoresis, fatigue and fever.   HENT:  Negative for congestion, dental problem, ear pain and sore throat.    Eyes:  Negative for pain, redness and visual disturbance.   Respiratory:  Negative for cough, chest tightness and shortness of breath.    Cardiovascular:  Negative for chest pain, palpitations and leg swelling.   Gastrointestinal:  Negative for abdominal pain, blood in stool, diarrhea, nausea and vomiting.   Endocrine: Negative for polydipsia and polyuria.   Genitourinary:  Negative for dysuria, flank pain, hematuria and urgency.   Musculoskeletal:  Negative for back pain, neck pain and neck stiffness. + RLE pain   Skin: +chronic RLE wound  Allergic/Immunologic: Negative for immunocompromised state.   Neurological:  Negative for dizziness, weakness, light-headedness, numbness and headaches.   Psychiatric/Behavioral:  Negative for hallucinations and suicidal ideas.      Physical Exam     Initial Vitals [11/07/22 2129]   BP Pulse Resp Temp SpO2   (!) 116/51 78 18 98.1 °F (36.7 °C) 96 %      MAP       --         Physical Exam  Physical Exam     Nursing note and vitals reviewed.  Constitutional: Patient appears well-developed and well-nourished. No distress.   HENT:   Head: Normocephalic and atraumatic.   Eyes: Conjunctivae and EOM are normal. Pupils are equal, round, and reactive to light.   Neck: Neck supple.   Normal range of motion.  Cardiovascular: Normal rate, regular rhythm, normal heart sounds and intact distal pulses.   Pulmonary/Chest: Breath sounds normal.   Abdominal: Abdomen is soft. Patient exhibits no distension. There is no abdominal tenderness.   Musculoskeletal:      Cervical back: Normal range of motion and neck supple.   Neurological:  Patient is alert and oriented to person, place, and time. No cranial nerve deficit. Gait normal. GCS score is 15.    Skin:         Psych: Normal mood/affect    ED Course   Procedures  Labs Reviewed   CBC W/ AUTO DIFFERENTIAL - Abnormal; Notable for the following components:       Result Value    RBC 3.46 (*)     Hemoglobin 10.0 (*)     Hematocrit 30.4 (*)     RDW 15.5 (*)     Immature Granulocytes 0.6 (*)     Lymph # 0.9 (*)     Lymph % 17.6 (*)     All other components within normal limits   COMPREHENSIVE METABOLIC PANEL - Abnormal; Notable for the following components:    Sodium 133 (*)     CO2 21 (*)     Glucose 119 (*)     BUN 30 (*)     Albumin 3.4 (*)     eGFR 39.1 (*)     All other components within normal limits   LACTIC ACID, PLASMA   URINALYSIS, REFLEX TO URINE CULTURE   BASIC METABOLIC PANEL   CBC W/ AUTO DIFFERENTIAL   URINALYSIS MICROSCOPIC          Imaging Results    None          Medications   aspirin EC tablet 81 mg (has no administration in time range)   azelastine 137 mcg (0.1 %) nasal spray 137 mcg (has no administration in time range)   vitamin D 1000 units tablet 1,000 Units (has no administration in time range)   fluticasone propionate 50 mcg/actuation nasal spray 50 mcg (has no administration in time range)   furosemide tablet 20 mg (has no administration in time range)   hydrOXYzine HCL tablet 25 mg (has no administration in time range)   pravastatin tablet 40 mg (has no administration in time range)   psyllium husk (aspartame) 3.4 gram packet 1 packet (has no administration in time range)   spironolactone tablet 25 mg (has no administration in time range)   sodium chloride 0.9% flush 5 mL (has no administration in time range)   albuterol-ipratropium 2.5 mg-0.5 mg/3 mL nebulizer solution 3 mL (has no administration in time range)   melatonin tablet 6 mg (has no administration in time range)   ondansetron disintegrating tablet 8 mg (has no administration in time range)   prochlorperazine  injection Soln 5 mg (has no administration in time range)   polyethylene glycol packet 17 g (has no administration in time range)   bisacodyL suppository 10 mg (has no administration in time range)   simethicone chewable tablet 80 mg (has no administration in time range)   aluminum-magnesium hydroxide-simethicone 200-200-20 mg/5 mL suspension 30 mL (has no administration in time range)   acetaminophen tablet 650 mg (has no administration in time range)   acetaminophen tablet 1,000 mg (has no administration in time range)   naloxone 0.4 mg/mL injection 0.02 mg (has no administration in time range)   glucose chewable tablet 16 g (has no administration in time range)   glucose chewable tablet 24 g (has no administration in time range)   glucagon (human recombinant) injection 1 mg (has no administration in time range)   dextrose 10% bolus 125 mL (has no administration in time range)   dextrose 10% bolus 250 mL (has no administration in time range)   heparin (porcine) injection 5,000 Units (has no administration in time range)   insulin aspart U-100 pen 0-5 Units (has no administration in time range)   vancomycin in dextrose 5 % 1 gram/250 mL IVPB 1,000 mg (0 mg Intravenous Stopped 11/8/22 0207)   acetaminophen tablet 500 mg (500 mg Oral Given 11/8/22 0028)                       The patient does appear to have a new cellulitis surrounding her chronic wound along her right lower extremity.    Will plan for IV antibiotics in the emergency department admission for IV antibiotics and wound care given history of diabetes and venous insufficiency.       Clinical Impression:   Final diagnoses:  [L03.90] Cellulitis        ED Disposition Condition    Observation                 Millicent Lopez MD  11/08/22 4922

## 2022-11-08 NOTE — ED NOTES
Pt placed La tele, pt ambulated to bathroom to bed with walker with steady gait, no assistance needed. Wound to leg covered by prior providers. Pt AAOx4, answering questions appropriately, NAD noted. Pt updated on plan of care, will continue to monitor

## 2022-11-08 NOTE — HPI
Jenniffer Jimenez is a 89 yo F with PMHx of CKD, HFpEF, A fib, Meniere's disease, venous insufficiency, HTN, HLD who presented to ED for worsening RLE cellulitis. Wound was first noticed 2 months ago, for which she was then hospitalized at [facility] one month ago after failing po abx therapy (Augmentin -> keflex/doxy).  She was placed on IV clindamycin during that admission and then transitioned to po doxy and cefdinir on discharge. Since discharge, she has received OP wound care and follows with OP ID. Patient receives wound care every other day at home and was told by wound care nurse to go to ED for increased redness and serous drainage today. Wound was initially not painful, but she now admits soreness and cramping to RLE. Also admits L hip pain. Of note, patient has become increasingly dependent on walker for ambulation over the past couple of months. Patient denies fever, chills, chest pain, SOB, cough, abdominal pain, n/v/d, weakness.    In ED: Afebrile. VSS. Hgb 10.0 ->9.0. Lactate 0.8. Given tylenol and IV vanc.

## 2022-11-09 ENCOUNTER — TELEPHONE (OUTPATIENT)
Dept: ALLERGY | Facility: CLINIC | Age: 87
End: 2022-11-09
Payer: MEDICARE

## 2022-11-09 LAB
ALBUMIN SERPL BCP-MCNC: 2.7 G/DL (ref 3.5–5.2)
ALP SERPL-CCNC: 108 U/L (ref 55–135)
ALT SERPL W/O P-5'-P-CCNC: 7 U/L (ref 10–44)
ANION GAP SERPL CALC-SCNC: 7 MMOL/L (ref 8–16)
AST SERPL-CCNC: 10 U/L (ref 10–40)
BASOPHILS # BLD AUTO: 0.01 K/UL (ref 0–0.2)
BASOPHILS NFR BLD: 0.3 % (ref 0–1.9)
BILIRUB SERPL-MCNC: 0.3 MG/DL (ref 0.1–1)
BUN SERPL-MCNC: 29 MG/DL (ref 8–23)
CALCIUM SERPL-MCNC: 8.3 MG/DL (ref 8.7–10.5)
CHLORIDE SERPL-SCNC: 109 MMOL/L (ref 95–110)
CO2 SERPL-SCNC: 23 MMOL/L (ref 23–29)
CREAT SERPL-MCNC: 1.3 MG/DL (ref 0.5–1.4)
DIFFERENTIAL METHOD: ABNORMAL
EOSINOPHIL # BLD AUTO: 0.1 K/UL (ref 0–0.5)
EOSINOPHIL NFR BLD: 1.4 % (ref 0–8)
ERYTHROCYTE [DISTWIDTH] IN BLOOD BY AUTOMATED COUNT: 15.5 % (ref 11.5–14.5)
EST. GFR  (NO RACE VARIABLE): 39.1 ML/MIN/1.73 M^2
GLUCOSE SERPL-MCNC: 115 MG/DL (ref 70–110)
HCT VFR BLD AUTO: 27.4 % (ref 37–48.5)
HGB BLD-MCNC: 8.9 G/DL (ref 12–16)
IMM GRANULOCYTES # BLD AUTO: 0.04 K/UL (ref 0–0.04)
IMM GRANULOCYTES NFR BLD AUTO: 1.1 % (ref 0–0.5)
LEFT ABI: 1.14
LEFT ARM BP: 130 MMHG
LEFT DORSALIS PEDIS: 157 MMHG
LEFT POSTERIOR TIBIAL: 131 MMHG
LYMPHOCYTES # BLD AUTO: 0.7 K/UL (ref 1–4.8)
LYMPHOCYTES NFR BLD: 19 % (ref 18–48)
MAGNESIUM SERPL-MCNC: 2.1 MG/DL (ref 1.6–2.6)
MCH RBC QN AUTO: 29.3 PG (ref 27–31)
MCHC RBC AUTO-ENTMCNC: 32.5 G/DL (ref 32–36)
MCV RBC AUTO: 90 FL (ref 82–98)
MONOCYTES # BLD AUTO: 0.4 K/UL (ref 0.3–1)
MONOCYTES NFR BLD: 10.3 % (ref 4–15)
NEUTROPHILS # BLD AUTO: 2.5 K/UL (ref 1.8–7.7)
NEUTROPHILS NFR BLD: 67.9 % (ref 38–73)
NRBC BLD-RTO: 0 /100 WBC
PHOSPHATE SERPL-MCNC: 3.9 MG/DL (ref 2.7–4.5)
PLATELET # BLD AUTO: 174 K/UL (ref 150–450)
PMV BLD AUTO: 10 FL (ref 9.2–12.9)
POCT GLUCOSE: 123 MG/DL (ref 70–110)
POCT GLUCOSE: 132 MG/DL (ref 70–110)
POCT GLUCOSE: 136 MG/DL (ref 70–110)
POCT GLUCOSE: 84 MG/DL (ref 70–110)
POTASSIUM SERPL-SCNC: 4.2 MMOL/L (ref 3.5–5.1)
PROT SERPL-MCNC: 5.5 G/DL (ref 6–8.4)
RBC # BLD AUTO: 3.04 M/UL (ref 4–5.4)
RIGHT ABI: 1.17
RIGHT ARM BP: 138 MMHG
RIGHT DORSALIS PEDIS: 147 MMHG
RIGHT POSTERIOR TIBIAL: 161 MMHG
SODIUM SERPL-SCNC: 139 MMOL/L (ref 136–145)
WBC # BLD AUTO: 3.68 K/UL (ref 3.9–12.7)

## 2022-11-09 PROCEDURE — 36415 COLL VENOUS BLD VENIPUNCTURE: CPT | Performed by: STUDENT IN AN ORGANIZED HEALTH CARE EDUCATION/TRAINING PROGRAM

## 2022-11-09 PROCEDURE — 25000003 PHARM REV CODE 250

## 2022-11-09 PROCEDURE — 83735 ASSAY OF MAGNESIUM: CPT | Performed by: STUDENT IN AN ORGANIZED HEALTH CARE EDUCATION/TRAINING PROGRAM

## 2022-11-09 PROCEDURE — G0378 HOSPITAL OBSERVATION PER HR: HCPCS

## 2022-11-09 PROCEDURE — 99204 PR OFFICE/OUTPT VISIT, NEW, LEVL IV, 45-59 MIN: ICD-10-PCS | Mod: GC,,, | Performed by: SURGERY

## 2022-11-09 PROCEDURE — 63600175 PHARM REV CODE 636 W HCPCS

## 2022-11-09 PROCEDURE — 99204 OFFICE O/P NEW MOD 45 MIN: CPT | Mod: GC,,, | Performed by: SURGERY

## 2022-11-09 PROCEDURE — 85025 COMPLETE CBC W/AUTO DIFF WBC: CPT | Performed by: STUDENT IN AN ORGANIZED HEALTH CARE EDUCATION/TRAINING PROGRAM

## 2022-11-09 PROCEDURE — 96372 THER/PROPH/DIAG INJ SC/IM: CPT

## 2022-11-09 PROCEDURE — 99226 PR SUBSEQUENT OBSERVATION CARE,LEVEL III: ICD-10-PCS | Mod: ,,, | Performed by: STUDENT IN AN ORGANIZED HEALTH CARE EDUCATION/TRAINING PROGRAM

## 2022-11-09 PROCEDURE — 84100 ASSAY OF PHOSPHORUS: CPT | Performed by: STUDENT IN AN ORGANIZED HEALTH CARE EDUCATION/TRAINING PROGRAM

## 2022-11-09 PROCEDURE — 80053 COMPREHEN METABOLIC PANEL: CPT | Performed by: STUDENT IN AN ORGANIZED HEALTH CARE EDUCATION/TRAINING PROGRAM

## 2022-11-09 PROCEDURE — 99226 PR SUBSEQUENT OBSERVATION CARE,LEVEL III: CPT | Mod: ,,, | Performed by: STUDENT IN AN ORGANIZED HEALTH CARE EDUCATION/TRAINING PROGRAM

## 2022-11-09 RX ORDER — FLUTICASONE PROPIONATE 50 MCG
1 SPRAY, SUSPENSION (ML) NASAL DAILY PRN
Status: DISCONTINUED | OUTPATIENT
Start: 2022-11-09 | End: 2022-11-10 | Stop reason: HOSPADM

## 2022-11-09 RX ADMIN — ACETAMINOPHEN 1000 MG: 500 TABLET ORAL at 10:11

## 2022-11-09 RX ADMIN — SPIRONOLACTONE 25 MG: 25 TABLET, FILM COATED ORAL at 09:11

## 2022-11-09 RX ADMIN — HEPARIN SODIUM 5000 UNITS: 5000 INJECTION INTRAVENOUS; SUBCUTANEOUS at 03:11

## 2022-11-09 RX ADMIN — ACETAMINOPHEN 1000 MG: 500 TABLET ORAL at 12:11

## 2022-11-09 RX ADMIN — ACETAMINOPHEN 1000 MG: 500 TABLET ORAL at 01:11

## 2022-11-09 RX ADMIN — HEPARIN SODIUM 5000 UNITS: 5000 INJECTION INTRAVENOUS; SUBCUTANEOUS at 10:11

## 2022-11-09 RX ADMIN — PRAVASTATIN SODIUM 40 MG: 40 TABLET ORAL at 09:11

## 2022-11-09 RX ADMIN — HEPARIN SODIUM 5000 UNITS: 5000 INJECTION INTRAVENOUS; SUBCUTANEOUS at 05:11

## 2022-11-09 RX ADMIN — AZELASTINE HYDROCHLORIDE 137 MCG: 137 SPRAY, METERED NASAL at 01:11

## 2022-11-09 RX ADMIN — CHOLECALCIFEROL TAB 25 MCG (1000 UNIT) 1000 UNITS: 25 TAB at 09:11

## 2022-11-09 RX ADMIN — FUROSEMIDE 20 MG: 20 TABLET ORAL at 05:11

## 2022-11-09 RX ADMIN — ASPIRIN 81 MG: 81 TABLET, COATED ORAL at 09:11

## 2022-11-09 RX ADMIN — AZELASTINE HYDROCHLORIDE 137 MCG: 137 SPRAY, METERED NASAL at 09:11

## 2022-11-09 RX ADMIN — FUROSEMIDE 20 MG: 20 TABLET ORAL at 03:11

## 2022-11-09 NOTE — HOSPITAL COURSE
Pt [facility]. ID consulted, who did not feel pt's non-healing RLE wound was due to infection: they recommended against abx, and signed off. Vascular surgery and wound care consulted for evaluation.

## 2022-11-09 NOTE — TELEPHONE ENCOUNTER
----- Message from Grazyna De La Fuente sent at 11/9/2022 11:59 AM CST -----  Contact: @[phone]  Pt is calling in stating that she missed her appt because she was admitted in the hospital and would like to know if you would be able to come down and see her. Pt states that she is in room 1111. Please call to discuss further.

## 2022-11-09 NOTE — HPI
89 year old spry female with CKD, HFpEF, A fib, Meniere's disease, venous insufficiency, HTN, HLD who presented to ED for worsening RLE cellulitis. Wound was first noticed 2 months ago. She has been hospitalized for this before and undergone fairly extensive IV antibiotic therapy as well as outpatient wound care (although she only reports one visit). Patient receives wound care every other day at home and was told by wound care nurse to go to ED for increased redness and serous drainage today. Wound was initially not painful, but she now admits soreness and cramping to RLE. Also admits L hip pain. Patient denies fever, chills, numbness, tingling, weakness to BLE

## 2022-11-09 NOTE — PLAN OF CARE
[facility]  Discharge Assessment    Primary Care Provider: Rubi Young, DO     Discharge Assessment (most recent)       BRIEF DISCHARGE ASSESSMENT - 11/09/22 1343          Discharge Planning    Assessment Type Discharge Planning Brief Assessment     Resource/Environmental Concerns none     Support Systems Children     Equipment Currently Used at Home rollator;cane, straight;grab bar     Current Living Arrangements home/apartment/condo     Patient/Family Anticipates Transition to home     Patient/Family Anticipated Services at Transition home health care;outpatient care     DME Needed Upon Discharge  other (see comments)   TBD    Discharge Plan A Home     Discharge Plan B Home Health        Physical Activity    On average, how many days per week do you engage in moderate to strenuous exercise (like a brisk walk)? 0 days     On average, how many minutes do you engage in exercise at this level? 0 min        Financial Resource Strain    How hard is it for you to pay for the very basics like food, housing, medical care, and heating? Not hard at all        Housing Stability    In the last 12 months, was there a time when you were not able to pay the mortgage or rent on time? No     In the last 12 months, how many places have you lived? 1     In the last 12 months, was there a time when you did not have a steady place to sleep or slept in a shelter (including now)? No        Transportation Needs    In the past 12 months, has lack of transportation kept you from medical appointments or from getting medications? No     In the past 12 months, has lack of transportation kept you from meetings, work, or from getting things needed for daily living? No        Food Insecurity    Within the past 12 months, the food you bought just didn't last and you didn't have money to get more. Never true        Stress    Do you feel stress - tense, restless, nervous, or anxious, or unable to sleep at night because your mind  is troubled all the time - these days? To some extent        Social Connections    In a typical week, how many times do you talk on the phone with family, friends, or neighbors? Once a week     How often do you get together with friends or relatives? Once a week     How often do you attend [employer]ian or Spiritism services? Never     Do you belong to any clubs or organizations such as [employer] groups, unions, fraternal or athletic groups, or school groups? Yes     How often do you attend meetings of the clubs or organizations you belong to? Never     Are you , , , , never , or living with a partner?         Alcohol Use    Q1: How often do you have a drink containing alcohol? Never     Q3: How often do you have six or more drinks on one occasion? Never                   Pt is a 89 y.o. female admitted with Non-Healing wound of RLE and has a PMH of CKD, HFrEF and AFib. She lives alone witgh her cat in a 5th floor apartment, elevator in the d. She is able to perform self care. She utilized insurance provided transport and Meals on Wheels. He dtr does check in on her frequently. [facility] Discharge Packet given to patient and/or family with understanding verbalized.   name and number and estimated discharge date written on white board in patient's room with request to call for any questions or concerns.  Will continue to follow for needs.  Srinivas Neely RN,BSN

## 2022-11-09 NOTE — CONSULTS
[facility]  Wound Care    Patient Name:  Jenniffer Jimenez   MRN:  [id number]  Date: 2022  Diagnosis: Non-healing wound of right lower extremity    History:     Past Medical History:   Diagnosis Date    Amblyopia of left eye 4/10/2013    Anxiety     Arthritis     Facet arthropathy, Lumbosacral    Cataract     Central retinal vein occlusion of left eye     Depression     Diabetic polyneuropathy 2022    Exotropia of both eyes 2013    recession RSR 5.0mm w/ adj; recession LR os 5.0 w/ adj; resect MR os  4.0mm    Hearing loss     History of resection of small bowel     Hypertensive retinopathy of both eyes     Hypoglycemia     Macular degeneration     OA (osteoarthritis) of shoulder     Right    Osteoporosis     Posterior vitreous detachment of both eyes     Rhinitis     TIA (transient ischemic attack)        Social History     Socioeconomic History    Marital status:    Occupational History    Occupation: retired   Tobacco Use    Smoking status: Former     Types: Cigarettes     Quit date: 10/29/1982     Years since quittin.0     Passive exposure: Never    Smokeless tobacco: Never   Substance and Sexual Activity    Alcohol use: Yes     Alcohol/week: 2.0 standard drinks     Types: 2 Shots of liquor per week     Comment: rare    Drug use: No    Sexual activity: Never   Other Topics Concern    Are you pregnant or think you may be? No    Breast-feeding No     Social Determinants of Health     Financial Resource Strain: Low Risk     Difficulty of Paying Living Expenses: Not hard at all   Food Insecurity: No Food Insecurity    Worried About Running Out of Food in the Last Year: Never true    Ran Out of Food in the Last Year: Never true   Transportation Needs: No Transportation Needs    Lack of Transportation (Medical): No    Lack of Transportation (Non-Medical): No   Physical Activity: Inactive    Days of Exercise per Week: 0 days    Minutes of Exercise per Session: 0 min   Stress:  Stress Concern Present    Feeling of Stress : To some extent   Social Connections: Socially Isolated    Frequency of Communication with Friends and Family: Once a week    Frequency of Social Gatherings with Friends and Family: Once a week    Attends Sabianist Services: Never    Active Member of Clubs or Organizations: Yes    Attends Club or Organization Meetings: Never    Marital Status:    Housing Stability: Low Risk     Unable to Pay for Housing in the Last Year: No    Number of Places Lived in the Last Year: 1    Unstable Housing in the Last Year: No       Precautions:     Allergies as of 11/07/2022 - Reviewed 11/07/2022   Allergen Reaction Noted    Opioids - morphine analogues Other (See Comments) 08/15/2018    Tizanidine Other (See Comments) 04/06/2018    Tramadol Hallucinations 01/18/2013    Beta-blockers (beta-adrenergic blocking agts) Other (See Comments) 10/23/2013    Morphine  12/24/2021    Opioids-meperidine and related  01/04/2022       [facility] Assessment Details/Treatment   Attempted to see patient for wound care consult for right leg cellulitis. The vascular team is at the bedside, wrapping the leg with xeroform and kerlix. Will follow-up.     11/09/2022

## 2022-11-09 NOTE — SUBJECTIVE & OBJECTIVE
Interval History: Pt seen and examined this morning on rosalba WILLSON. Pain well-controlled. Updated on ID's recommendations, and that vascular surgery will see her today for assessment. Care plan reviewed. Otherwise, doing well and with no further complaints at this time.      Objective:     Vital Signs (Most Recent):  Temp: 97.8 °F (36.6 °C) (11/09/22 1129)  Pulse: 60 (11/09/22 1129)  Resp: 18 (11/09/22 1129)  BP: 133/65 (11/09/22 1129)  SpO2: 97 % (11/09/22 1129) Vital Signs (24h Range):  Temp:  [97.7 °F (36.5 °C)-98.8 °F (37.1 °C)] 97.8 °F (36.6 °C)  Pulse:  [55-78] 60  Resp:  [16-20] 18  SpO2:  [95 %-98 %] 97 %  BP: (106-153)/(53-72) 133/65     Weight: 77.6 kg (171 lb)  Body mass index is 28.46 kg/m².    Intake/Output Summary (Last 24 hours) at 11/9/2022 1134  Last data filed at 11/9/2022 0115  Gross per 24 hour   Intake --   Output 700 ml   Net -700 ml        Physical Exam  Vitals and nursing note reviewed.   Constitutional:       General: She is not in acute distress.     Appearance: She is well-developed. She is not ill-appearing or diaphoretic.      Comments: Resting comfortably   HENT:      Head: Normocephalic and atraumatic.      Mouth/Throat:      Pharynx: No oropharyngeal exudate.   Eyes:      Conjunctiva/sclera: Conjunctivae normal.      Pupils: Pupils are equal, round, and reactive to light.   Cardiovascular:      Rate and Rhythm: Normal rate and regular rhythm.      Heart sounds: Normal heart sounds.   Pulmonary:      Effort: Pulmonary effort is normal. No respiratory distress.      Breath sounds: Normal breath sounds. No wheezing.   Abdominal:      General: Bowel sounds are normal. There is no distension.      Palpations: Abdomen is soft.      Tenderness: There is no abdominal tenderness.   Musculoskeletal:         General: Swelling (RLE>LLE) present. No tenderness. Normal range of motion.      Cervical back: Normal range of motion and neck supple.      Right lower leg: Edema (2+up to knee) present.       Left lower leg: Edema (2+) present.   Lymphadenopathy:      Cervical: No cervical adenopathy.   Skin:     General: Skin is warm and dry.      Capillary Refill: Capillary refill takes less than 2 seconds.      Findings: No rash.      Comments: RLE wound; wrapped in clean, dry, and intact dressing on my exam. See picture below from ED provider   Neurological:      General: No focal deficit present.      Mental Status: She is alert and oriented to person, place, and time.      Cranial Nerves: No cranial nerve deficit.      Sensory: No sensory deficit.      Coordination: Coordination normal.   Psychiatric:         Behavior: Behavior normal.         Thought Content: Thought content normal.         Judgment: Judgment normal.              Significant Labs: All pertinent labs within the past 24 hours have been reviewed.    Significant Imaging: I have reviewed all pertinent imaging results/findings within the past 24 hours.

## 2022-11-09 NOTE — SUBJECTIVE & OBJECTIVE
"Medications Prior to Admission   Medication Sig Dispense Refill Last Dose    acetaminophen (TYLENOL) 500 MG tablet Take 1,000 mg by mouth 2 (two) times a day.       aspirin (ECOTRIN) 81 MG EC tablet Take 1 tablet (81 mg total) by mouth once daily. 90 tablet 3     azelastine (ASTELIN) 137 mcg (0.1 %) nasal spray 1 spray (137 mcg total) by Nasal route 2 (two) times daily. 30 mL 1     fluticasone propionate (FLONASE) 50 mcg/actuation nasal spray USE 1 SPRAY IN EACH NOSTRIL ONE TIME DAILY 32 g 11     furosemide (LASIX) 20 MG tablet Take 1 tablet (20 mg total) by mouth 2 (two) times daily before meals. Take twice a day for Weight greater than 160 lb, once a day for weights less than 160 lb 180 tablet 3     guaiFENesin 100 mg/5 ml (ROBITUSSIN) 100 mg/5 mL syrup Take 15 mLs by mouth once daily.       pravastatin (PRAVACHOL) 40 MG tablet Take 1 tablet (40 mg total) by mouth once daily. 90 tablet 3     spironolactone (ALDACTONE) 25 MG tablet Take twice a day for Weight greater than 160 lb, once a day for weights less than 160 lb 180 tablet 3     vit C/E/Zn/coppr/lutein/zeaxan (OCUVITE LUTEIN AND ZEAXANTHIN ORAL) Take 1 capsule by mouth once daily. Lutien 25 mg, Zeaxanthin 5 mg       vitamin E 400 UNIT capsule Take 400 Units by mouth once daily.       doxycycline (VIBRA-TABS) 100 MG tablet Take 1 tablet (100 mg total) by mouth 2 (two) times daily. (Patient not taking: Reported on 11/8/2022) 30 tablet 2 Not Taking    miconazole nitrate 2% (MICOTIN) 2 % Oint Apply topically every other day. 57 g 2     mupirocin (BACTROBAN) 2 % ointment Apply topically 3 (three) times daily. Apply locally every other day 22 g 2        Review of patient's allergies indicates:   Allergen Reactions    Opioids - morphine analogues Other (See Comments)     Bowel issues; bowel obstruction    Tizanidine Other (See Comments)     "Lips were numb,  Almost passed out."    Tramadol Hallucinations    Beta-blockers (beta-adrenergic blocking agts) Other (See " Comments)     Can not go on beta blockers for long period of time - due to taking allergy injections    Morphine     Opioids-meperidine and related        Past Medical History:   Diagnosis Date    Amblyopia of left eye 4/10/2013    Anxiety     Arthritis     Facet arthropathy, Lumbosacral    Cataract     Central retinal vein occlusion of left eye     Depression     Diabetic polyneuropathy 2022    Exotropia of both eyes 2013    recession RSR 5.0mm w/ adj; recession LR os 5.0 w/ adj; resect MR os  4.0mm    Hearing loss     History of resection of small bowel     Hypertensive retinopathy of both eyes     Hypoglycemia     Macular degeneration     OA (osteoarthritis) of shoulder     Right    Osteoporosis     Posterior vitreous detachment of both eyes     Rhinitis     TIA (transient ischemic attack)      Past Surgical History:   Procedure Laterality Date    APPENDECTOMY      CARDIAC CATHETERIZATION      CATARACT EXTRACTION W/  INTRAOCULAR LENS IMPLANT Bilateral      SECTION, CLASSIC      CLOSURE OF LEFT ATRIAL APPENDAGE USING DEVICE N/A 2020    Procedure: Left atrial appendage closure device;  Surgeon: Abundio Curtis MD;  Location: [facility] CATH LAB;  Service: Cardiology;  Laterality: N/A;    HYSTERECTOMY      INNER EAR SURGERY      JOINT REPLACEMENT      LEFT KNEE REPLACEMENT IN  -    OOPHORECTOMY      SINUS SURGERY      STRABISMUS SURGERY  13    RSR recession 5 mm, LLR recession 5 mm and LMR resection 4mm    STRABISMUS SURGERY  2014    recess LR OD 6mm    TONSILLECTOMY      watchman surgery N/A 2020     Family History       Problem Relation (Age of Onset)    Breast cancer Maternal Aunt    Diabetes Sister, Brother    Heart disease Sister, Sister    Hypertension Mother, Father    Liver disease Sister    No Known Problems Maternal Uncle, Paternal Aunt, Paternal Uncle, Maternal Grandmother, Maternal Grandfather, Paternal Grandmother, Paternal Grandfather, Daughter, Son, Son, Son           Tobacco Use    Smoking status: Former     Types: Cigarettes     Quit date: 10/29/1982     Years since quittin.0     Passive exposure: Never    Smokeless tobacco: Never   Substance and Sexual Activity    Alcohol use: Yes     Alcohol/week: 2.0 standard drinks     Types: 2 Shots of liquor per week     Comment: rare    Drug use: No    Sexual activity: Never     Review of Systems   Skin:  Positive for wound.   All other systems reviewed and are negative.  Objective:     Vital Signs (Most Recent):  Temp: 97.8 °F (36.6 °C) (22)  Pulse: 60 (22)  Resp: 18 (22)  BP: 133/65 (22)  SpO2: 97 % (22) Vital Signs (24h Range):  Temp:  [97.7 °F (36.5 °C)-98.8 °F (37.1 °C)] 97.8 °F (36.6 °C)  Pulse:  [55-78] 60  Resp:  [16-20] 18  SpO2:  [95 %-98 %] 97 %  BP: (106-153)/(53-72) 133/65     Weight: 76.3 kg (168 lb 3.2 oz)  Body mass index is 27.99 kg/m².    Physical Exam  Vitals and nursing note reviewed.   Constitutional:       Appearance: Normal appearance.   HENT:      Head: Normocephalic and atraumatic.   Cardiovascular:      Rate and Rhythm: Normal rate and regular rhythm.      Comments: R femoral pulse 2+   L DP palpable   Extensive wound care bandage to RLE   Pulmonary:      Effort: Pulmonary effort is normal. No respiratory distress.   Abdominal:      General: Abdomen is flat. There is no distension.      Palpations: Abdomen is soft. There is no mass.      Tenderness: There is no abdominal tenderness.      Hernia: No hernia is present.   Musculoskeletal:      Right lower leg: No edema.      Left lower leg: No edema.   Skin:     General: Skin is warm and dry.      Findings: Lesion (see picture) present.   Neurological:      General: No focal deficit present.      Mental Status: She is alert and oriented to person, place, and time.   Psychiatric:         Mood and Affect: Mood normal.         Behavior: Behavior normal.           Significant Labs:  CBC:   Recent Labs    Lab 11/09/22  0531   WBC 3.68*   RBC 3.04*   HGB 8.9*   HCT 27.4*      MCV 90   MCH 29.3   MCHC 32.5       Significant Diagnostics:  I have reviewed all pertinent imaging results/findings within the past 24 hours.    HOLLY/US arterial/ Venous duplex pending

## 2022-11-09 NOTE — CHAPLAIN
Visited pt per spiritual consult order (advance directive assistance). Pt stated she has a living will and healthcare POA in place already. She didn't see the need to complete another one. After speaking with nurse after my visit, nurse said pt had expressed a desire to be DNR and that pt's LW and healthcare POA aren't on file with [facility]. Pt told me her daughter will be visiting this evening. I will re-visit pt when daughter arrives and will see if they wish to complete a LW this evening indicating DNR.

## 2022-11-09 NOTE — PHARMACY MED REC
"Admission Medication History     The home medication history was taken by Radha Pham.    You may go to "Admission" then "Reconcile Home Medications" tabs to review and/or act upon these items.     The home medication list has been updated by the Pharmacy department.   Please read ALL comments highlighted in yellow.   Please address this information as you see fit.    Feel free to contact us if you have any questions or require assistance.      The medications listed below were removed from the home medication list. Please reorder if appropriate:  Patient reports no longer taking the following medication(s):  HYDROXYZINE HCL 25 MG  PSYLLIUM 0.52 GRAM  CHOLECALCIFEROL, VITAMIN D3,    Medications listed below were obtained from: Patient/family  PTA Medications   Medication Sig    acetaminophen (TYLENOL) 500 MG tablet   Take 1,000 mg by mouth 2 (two) times a day.    aspirin (ECOTRIN) 81 MG EC tablet   Take 1 tablet (81 mg total) by mouth once daily.    azelastine (ASTELIN) 137 mcg (0.1 %) nasal spray   1 spray (137 mcg total) by Nasal route 2 (two) times daily.    fluticasone propionate (FLONASE) 50 mcg/actuation nasal spray   USE 1 SPRAY IN EACH NOSTRIL ONE TIME DAILY    furosemide (LASIX) 20 MG tablet       Take 1 tablet (20 mg total) by mouth 2 (two) times daily before meals. Take twice a day for Weight greater than 160 lb, once a day for weights less than 160 lb    guaiFENesin 100 mg/5 ml (ROBITUSSIN) 100 mg/5 mL syrup   Take 15 mLs by mouth once daily.    pravastatin (PRAVACHOL) 40 MG tablet   Take 1 tablet (40 mg total) by mouth once daily.    spironolactone (ALDACTONE) 25 MG tablet   Take twice a day for Weight greater than 160 lb, once a day for weights less than 160 lb    vit C/E/Zn/coppr/lutein/zeaxan (OCUVITE LUTEIN AND ZEAXANTHIN ORAL)   Take 1 capsule by mouth once daily. Lutien 25 mg, Zeaxanthin 5 mg    vitamin E 400 UNIT capsule   Take 400 Units by mouth once daily.    doxycycline (VIBRA-TABS) 100 MG " tablet   Take 1 tablet (100 mg total) by mouth 2 (two) times daily. (Patient not taking: Reported on 11/8/2022)    miconazole nitrate 2% (MICOTIN) 2 % Oint   Apply topically every other day.    mupirocin (BACTROBAN) 2 % ointment Apply topically 3 (three) times daily. Apply locally every other day         Radha Pham  [id number]                  .

## 2022-11-09 NOTE — ASSESSMENT & PLAN NOTE
89 yo F with above history, limited ambulation, and non-healing venous stasis ulcer to RLE.     - No acute surgical intervention indicated  - Continue antibiotics as directed by ID; wound care; RLE elevation  - Can obtain arterial work up - HOLLY/TBI and US arterial   - Can also obtain VAS venous duplex to evaluate for reflux amendable to tx (VAS US venous legs bilateral for venous insufficiency)  - These studies are ordered, will follow up.

## 2022-11-09 NOTE — CONSULTS
[facility]  Vascular Surgery  Consult Note    Inpatient consult to Vascular Surgery  Consult performed by: Afia Breaux MD  Consult ordered by: Shilo Swift MD        Subjective:     Chief Complaint/Reason for Admission: Non healing ulcer; RLE cellulitis     History of Present Illness: 89 year old spry female with CKD, HFpEF, A fib, Meniere's disease, venous insufficiency, HTN, HLD who presented to ED for worsening RLE cellulitis. Wound was first noticed 2 months ago. She has been hospitalized for this before and undergone fairly extensive IV antibiotic therapy as well as outpatient wound care (although she only reports one visit). Patient receives wound care every other day at home and was told by wound care nurse to go to ED for increased redness and serous drainage today. Wound was initially not painful, but she now admits soreness and cramping to RLE. Also admits L hip pain. Patient denies fever, chills, numbness, tingling, weakness to BLE      Medications Prior to Admission   Medication Sig Dispense Refill Last Dose    acetaminophen (TYLENOL) 500 MG tablet Take 1,000 mg by mouth 2 (two) times a day.       aspirin (ECOTRIN) 81 MG EC tablet Take 1 tablet (81 mg total) by mouth once daily. 90 tablet 3     azelastine (ASTELIN) 137 mcg (0.1 %) nasal spray 1 spray (137 mcg total) by Nasal route 2 (two) times daily. 30 mL 1     fluticasone propionate (FLONASE) 50 mcg/actuation nasal spray USE 1 SPRAY IN EACH NOSTRIL ONE TIME DAILY 32 g 11     furosemide (LASIX) 20 MG tablet Take 1 tablet (20 mg total) by mouth 2 (two) times daily before meals. Take twice a day for Weight greater than 160 lb, once a day for weights less than 160 lb 180 tablet 3     guaiFENesin 100 mg/5 ml (ROBITUSSIN) 100 mg/5 mL syrup Take 15 mLs by mouth once daily.       pravastatin (PRAVACHOL) 40 MG tablet Take 1 tablet (40 mg total) by mouth once daily. 90 tablet 3     spironolactone (ALDACTONE) 25 MG tablet Take twice a day  "for Weight greater than 160 lb, once a day for weights less than 160 lb 180 tablet 3     vit C/E/Zn/coppr/lutein/zeaxan (OCUVITE LUTEIN AND ZEAXANTHIN ORAL) Take 1 capsule by mouth once daily. Lutien 25 mg, Zeaxanthin 5 mg       vitamin E 400 UNIT capsule Take 400 Units by mouth once daily.       doxycycline (VIBRA-TABS) 100 MG tablet Take 1 tablet (100 mg total) by mouth 2 (two) times daily. (Patient not taking: Reported on 11/8/2022) 30 tablet 2 Not Taking    miconazole nitrate 2% (MICOTIN) 2 % Oint Apply topically every other day. 57 g 2     mupirocin (BACTROBAN) 2 % ointment Apply topically 3 (three) times daily. Apply locally every other day 22 g 2        Review of patient's allergies indicates:   Allergen Reactions    Opioids - morphine analogues Other (See Comments)     Bowel issues; bowel obstruction    Tizanidine Other (See Comments)     "Lips were numb,  Almost passed out."    Tramadol Hallucinations    Beta-blockers (beta-adrenergic blocking agts) Other (See Comments)     Can not go on beta blockers for long period of time - due to taking allergy injections    Morphine     Opioids-meperidine and related        Past Medical History:   Diagnosis Date    Amblyopia of left eye 4/10/2013    Anxiety     Arthritis     Facet arthropathy, Lumbosacral    Cataract     Central retinal vein occlusion of left eye     Depression     Diabetic polyneuropathy 1/6/2022    Exotropia of both eyes 2/6/2013    recession RSR 5.0mm w/ adj; recession LR os 5.0 w/ adj; resect MR os  4.0mm    Hearing loss     History of resection of small bowel     Hypertensive retinopathy of both eyes     Hypoglycemia     Macular degeneration     OA (osteoarthritis) of shoulder     Right    Osteoporosis     Posterior vitreous detachment of both eyes     Rhinitis     TIA (transient ischemic attack)      Past Surgical History:   Procedure Laterality Date    APPENDECTOMY      CARDIAC CATHETERIZATION      CATARACT EXTRACTION W/  INTRAOCULAR LENS " IMPLANT Bilateral      SECTION, CLASSIC      CLOSURE OF LEFT ATRIAL APPENDAGE USING DEVICE N/A 2020    Procedure: Left atrial appendage closure device;  Surgeon: Abundio Curtis MD;  Location: [facility] CATH LAB;  Service: Cardiology;  Laterality: N/A;    HYSTERECTOMY      INNER EAR SURGERY      JOINT REPLACEMENT      LEFT KNEE REPLACEMENT IN 2013 -    OOPHORECTOMY      SINUS SURGERY      STRABISMUS SURGERY  13    RSR recession 5 mm, LLR recession 5 mm and LMR resection 4mm    STRABISMUS SURGERY  2014    recess LR OD 6mm    TONSILLECTOMY      watchman surgery N/A 2020     Family History       Problem Relation (Age of Onset)    Breast cancer Maternal Aunt    Diabetes Sister, Brother    Heart disease Sister, Sister    Hypertension Mother, Father    Liver disease Sister    No Known Problems Maternal Uncle, Paternal Aunt, Paternal Uncle, Maternal Grandmother, Maternal Grandfather, Paternal Grandmother, Paternal Grandfather, Daughter, Son, Son, Son          Tobacco Use    Smoking status: Former     Types: Cigarettes     Quit date: 10/29/1982     Years since quittin.0     Passive exposure: Never    Smokeless tobacco: Never   Substance and Sexual Activity    Alcohol use: Yes     Alcohol/week: 2.0 standard drinks     Types: 2 Shots of liquor per week     Comment: rare    Drug use: No    Sexual activity: Never     Review of Systems   Skin:  Positive for wound.   All other systems reviewed and are negative.  Objective:     Vital Signs (Most Recent):  Temp: 97.8 °F (36.6 °C) (22)  Pulse: 60 (22)  Resp: 18 (22)  BP: 133/65 (22)  SpO2: 97 % (22) Vital Signs (24h Range):  Temp:  [97.7 °F (36.5 °C)-98.8 °F (37.1 °C)] 97.8 °F (36.6 °C)  Pulse:  [55-78] 60  Resp:  [16-20] 18  SpO2:  [95 %-98 %] 97 %  BP: (106-153)/(53-72) 133/65     Weight: 76.3 kg (168 lb 3.2 oz)  Body mass index is 27.99 kg/m².    Physical Exam  Vitals and nursing note reviewed.    Constitutional:       Appearance: Normal appearance.   HENT:      Head: Normocephalic and atraumatic.   Cardiovascular:      Rate and Rhythm: Normal rate and regular rhythm.      Comments: R femoral pulse 2+   L DP palpable   Extensive wound care bandage to RLE   Pulmonary:      Effort: Pulmonary effort is normal. No respiratory distress.   Abdominal:      General: Abdomen is flat. There is no distension.      Palpations: Abdomen is soft. There is no mass.      Tenderness: There is no abdominal tenderness.      Hernia: No hernia is present.   Musculoskeletal:      Right lower leg: No edema.      Left lower leg: No edema.   Skin:     General: Skin is warm and dry.      Findings: Lesion (see picture) present.   Neurological:      General: No focal deficit present.      Mental Status: She is alert and oriented to person, place, and time.   Psychiatric:         Mood and Affect: Mood normal.         Behavior: Behavior normal.           Significant Labs:  CBC:   Recent Labs   Lab 11/09/22  0531   WBC 3.68*   RBC 3.04*   HGB 8.9*   HCT 27.4*      MCV 90   MCH 29.3   MCHC 32.5       Significant Diagnostics:  I have reviewed all pertinent imaging results/findings within the past 24 hours.    HOLLY/US arterial/ Venous duplex pending     Assessment/Plan:     * Non-healing wound of right lower extremity  89 yo F with above history, limited ambulation, and non-healing venous stasis ulcer to RLE.     - No acute surgical intervention indicated  - Continue antibiotics as directed by ID; wound care; RLE elevation  - Good candidate for hema boot  - Can obtain arterial work up - HOLLY/TBI. Good palpable pulses.  - Can also obtain VAS venous duplex to evaluate for reflux amendable to tx (VAS US venous legs bilateral for venous insufficiency)  - These studies are ordered, will follow up.  - Will hopefully have insufficiency amendable to vascular intervention - can follow up with Dr. Black outpatient to discuss options for  intervention.             Thank you for your consult. I will follow-up with patient. Please contact us if you have any additional questions.    Afia Breaux MD  Vascular Surgery  [facility]

## 2022-11-09 NOTE — ASSESSMENT & PLAN NOTE
89 yo F with PMHx of CKD, HFpEF, A fib, Meniere's disease, venous insufficiency, HTN, HLD who presented to ED for worsening RLE cellulitis. Recent admission 1 month ago and treated with IV clindamycin and transitioned to po doxy and cefdinir on discharge. Follows with wound care and ID outpatient.    - SIRS 0/4  - Lactate 0.8  - Given tylenol and IV vanc in ED  - RLE US negative for DVT  - recent wound cx performed on 11/02 with pseudomonas aeruginosa  - ID consulted: do not think this is infectious, recommended no abx, signed off  - Vascular surgery consulted for assistance  - wound care consulted  - continuing to monitor

## 2022-11-09 NOTE — PROGRESS NOTES
[facility] [address] [place] [address] Medicine  Progress Note    Patient Name: Jenniffer Jimenez  MRN: [id number]  Patient Class: OP- Observation   Admission Date: 11/7/2022  Length of Stay: 0 days  Attending Physician: Shilo Swift MD  Primary Care Provider: Rubi Young DO        Subjective:     Principal Problem:Non-healing wound of right lower extremity        HPI:  Jenniffer Jimenez is a 89 yo F with PMHx of CKD, HFpEF, A fib, Meniere's disease, venous insufficiency, HTN, HLD who presented to ED for worsening RLE cellulitis. Wound was first noticed 2 months ago, for which she was then hospitalized at [facility] [place] one month ago after failing po abx therapy (Augmentin -> keflex/doxy).  She was placed on IV clindamycin during that admission and then transitioned to po doxy and cefdinir on discharge. Since discharge, she has received OP wound care and follows with OP ID. Patient receives wound care every other day at home and was told by wound care nurse to go to ED for increased redness and serous drainage today. Wound was initially not painful, but she now admits soreness and cramping to RLE. Also admits L hip pain. Of note, patient has become increasingly dependent on walker for ambulation over the past couple of months. Patient denies fever, chills, chest pain, SOB, cough, abdominal pain, n/v/d, weakness.    In ED: Afebrile. VSS. Hgb 10.0 ->9.0. Lactate 0.8. Given tylenol and IV vanc.      Overview/Hospital Course:  Pt admitted to [facility]. ID consulted, who did not feel pt's non-healing RLE wound was due to infection: they recommended against abx, and signed off. Vascular surgery and wound care consulted for evaluation.      Interval History: Pt seen and examined this morning on rounds. DEMETRIS. Pain well-controlled. Updated on ID's recommendations, and that vascular surgery will see her today for assessment. Care plan reviewed. Otherwise, doing well and with no further complaints at this time.      Objective:      Vital Signs (Most Recent):  Temp: 97.8 °F (36.6 °C) (11/09/22 1129)  Pulse: 60 (11/09/22 1129)  Resp: 18 (11/09/22 1129)  BP: 133/65 (11/09/22 1129)  SpO2: 97 % (11/09/22 1129) Vital Signs (24h Range):  Temp:  [97.7 °F (36.5 °C)-98.8 °F (37.1 °C)] 97.8 °F (36.6 °C)  Pulse:  [55-78] 60  Resp:  [16-20] 18  SpO2:  [95 %-98 %] 97 %  BP: (106-153)/(53-72) 133/65     Weight: 77.6 kg (171 lb)  Body mass index is 28.46 kg/m².    Intake/Output Summary (Last 24 hours) at 11/9/2022 1134  Last data filed at 11/9/2022 0115  Gross per 24 hour   Intake --   Output 700 ml   Net -700 ml        Physical Exam  Vitals and nursing note reviewed.   Constitutional:       General: She is not in acute distress.     Appearance: She is well-developed. She is not ill-appearing or diaphoretic.      Comments: Resting comfortably   HENT:      Head: Normocephalic and atraumatic.      Mouth/Throat:      Pharynx: No oropharyngeal exudate.   Eyes:      Conjunctiva/sclera: Conjunctivae normal.      Pupils: Pupils are equal, round, and reactive to light.   Cardiovascular:      Rate and Rhythm: Normal rate and regular rhythm.      Heart sounds: Normal heart sounds.   Pulmonary:      Effort: Pulmonary effort is normal. No respiratory distress.      Breath sounds: Normal breath sounds. No wheezing.   Abdominal:      General: Bowel sounds are normal. There is no distension.      Palpations: Abdomen is soft.      Tenderness: There is no abdominal tenderness.   Musculoskeletal:         General: Swelling (RLE>LLE) present. No tenderness. Normal range of motion.      Cervical back: Normal range of motion and neck supple.      Right lower leg: Edema (2+up to knee) present.      Left lower leg: Edema (2+) present.   Lymphadenopathy:      Cervical: No cervical adenopathy.   Skin:     General: Skin is warm and dry.      Capillary Refill: Capillary refill takes less than 2 seconds.      Findings: No rash.      Comments: RLE wound; wrapped in clean, dry, and  intact dressing on my exam. See picture below from ED provider   Neurological:      General: No focal deficit present.      Mental Status: She is alert and oriented to person, place, and time.      Cranial Nerves: No cranial nerve deficit.      Sensory: No sensory deficit.      Coordination: Coordination normal.   Psychiatric:         Behavior: Behavior normal.         Thought Content: Thought content normal.         Judgment: Judgment normal.              Significant Labs: All pertinent labs within the past 24 hours have been reviewed.    Significant Imaging: I have reviewed all pertinent imaging results/findings within the past 24 hours.      Assessment/Plan:      * Non-healing wound of right lower extremity  89 yo F with PMHx of CKD, HFpEF, A fib, Meniere's disease, venous insufficiency, HTN, HLD who presented to ED for worsening RLE cellulitis. Recent admission 1 month ago and treated with IV clindamycin and transitioned to po doxy and cefdinir on discharge. Follows with wound care and ID outpatient.    - SIRS 0/4  - Lactate 0.8  - Given tylenol and IV vanc in ED  - RLE US negative for DVT  - recent wound cx performed on 11/02 with pseudomonas aeruginosa  - ID consulted: do not think this is infectious, recommended no abx, signed off  - Vascular surgery consulted for assistance  - wound care consulted  - continuing to monitor    Cellulitis of right lower extremity  - As above    Normocytic anemia  - Hgb 10 -> 9.0 on admit (baseline ~10)  -Obtain type and screen and transfuse if Hb <7 or patient is acutely symptomatic    Venous insufficiency of both lower extremities  - amb referral to vasc surgery placed on last admission    Chronic diastolic heart failure  - Patient is identified as having Diastolic (HFpEF) heart failure that is Chronic.   - CHF is currently controlled.   - Latest ECHO performed and demonstrates- Results for orders placed during the hospital encounter of 10/07/22    Echo    Interpretation  Summary  · The left ventricle is normal in size with concentric remodeling and normal systolic function.  · The estimated ejection fraction is 55-60%.  · Grade III left ventricular diastolic dysfunction.  · Mild mitral regurgitation.  · Normal right ventricular size with mildly reduced right ventricular systolic function.  · Severe tricuspid regurgitation.  · The estimated PA systolic pressure is 52 mmHg. PASP may be under-estimated due to TR severity.  · Elevated central venous pressure (15 mmHg).  · There is pulmonary hypertension.  · Severe left atrial enlargement.  .   - Continue Furosemide Aldactone and monitor clinical status closely.   - Monitor on telemetry.   - Patient is off CHF pathway.    - Monitor strict Is&Os and daily weights.    - Place on fluid restriction of 1.5 L.   - Continue to stress to patient importance of self efficacy and  on diet for CHF.   - BLE swelling noted on exam. Patient denies SOB and lungs CTA. Swelling likely 2/2 venous insufficiency, but consider BNP if concern for acute exacerbation.    Stage 3b chronic kidney disease  - Cr 1.3 on admit  - renally dose meds  - avoid nephrotoxic meds  - daily bmp    Chronic atrial fibrillation  - s/p Watchman  - EKG pending  - continue home ASA    Meniere's disease  - appointment with allergy/immunology scheduled for today; will need to be rescheduled    Primary hypertension  - controlled on admit  - continue home lasix    Pure hypercholesterolemia  - continue home statin      VTE Risk Mitigation (From admission, onward)         Ordered     heparin (porcine) injection 5,000 Units  Every 8 hours         11/08/22 0114     IP VTE HIGH RISK PATIENT  Once         11/08/22 0114                Discharge Planning   GILES: 11/10/2022     Code Status: Full Code   Is the patient medically ready for discharge?: No    Reason for patient still in hospital (select all that apply): Patient trending condition             Shilo Swift MD  Attending  Physician  Department of Hospital Medicine  Epic secure chat preferred, or ext. [phone]  11/9/2022

## 2022-11-09 NOTE — PLAN OF CARE
Problem: Adult Inpatient Plan of Care  Goal: Plan of Care Review  Outcome: Ongoing, Progressing     Problem: Adult Inpatient Plan of Care  Goal: Patient-Specific Goal (Individualized)  Outcome: Ongoing, Progressing     Problem: Infection  Goal: Absence of Infection Signs and Symptoms  Outcome: Ongoing, Progressing     Problem: Fall Injury Risk  Goal: Absence of Fall and Fall-Related Injury  Outcome: Ongoing, Progressing     Problem: Impaired Wound Healing  Goal: Optimal Wound Healing  Outcome: Ongoing, Progressing       Problem: Diabetes Comorbidity  Goal: Blood Glucose Level Within Targeted Range  Outcome: Ongoing, Progressing

## 2022-11-10 VITALS
SYSTOLIC BLOOD PRESSURE: 118 MMHG | WEIGHT: 168.19 LBS | HEART RATE: 69 BPM | BODY MASS INDEX: 27.99 KG/M2 | TEMPERATURE: 10 F | RESPIRATION RATE: 19 BRPM | OXYGEN SATURATION: 100 % | DIASTOLIC BLOOD PRESSURE: 56 MMHG

## 2022-11-10 LAB
ALBUMIN SERPL BCP-MCNC: 2.9 G/DL (ref 3.5–5.2)
ALP SERPL-CCNC: 114 U/L (ref 55–135)
ALT SERPL W/O P-5'-P-CCNC: 8 U/L (ref 10–44)
ANION GAP SERPL CALC-SCNC: 10 MMOL/L (ref 8–16)
AST SERPL-CCNC: 10 U/L (ref 10–40)
BASOPHILS # BLD AUTO: 0.02 K/UL (ref 0–0.2)
BASOPHILS NFR BLD: 0.6 % (ref 0–1.9)
BILIRUB SERPL-MCNC: 0.4 MG/DL (ref 0.1–1)
BUN SERPL-MCNC: 31 MG/DL (ref 8–23)
CALCIUM SERPL-MCNC: 9 MG/DL (ref 8.7–10.5)
CHLORIDE SERPL-SCNC: 103 MMOL/L (ref 95–110)
CO2 SERPL-SCNC: 23 MMOL/L (ref 23–29)
CREAT SERPL-MCNC: 1.2 MG/DL (ref 0.5–1.4)
DIFFERENTIAL METHOD: ABNORMAL
EOSINOPHIL # BLD AUTO: 0.1 K/UL (ref 0–0.5)
EOSINOPHIL NFR BLD: 2.3 % (ref 0–8)
ERYTHROCYTE [DISTWIDTH] IN BLOOD BY AUTOMATED COUNT: 15.4 % (ref 11.5–14.5)
EST. GFR  (NO RACE VARIABLE): 43 ML/MIN/1.73 M^2
GLUCOSE SERPL-MCNC: 102 MG/DL (ref 70–110)
HCT VFR BLD AUTO: 28.4 % (ref 37–48.5)
HGB BLD-MCNC: 9.2 G/DL (ref 12–16)
IMM GRANULOCYTES # BLD AUTO: 0.02 K/UL (ref 0–0.04)
IMM GRANULOCYTES NFR BLD AUTO: 0.6 % (ref 0–0.5)
LYMPHOCYTES # BLD AUTO: 0.9 K/UL (ref 1–4.8)
LYMPHOCYTES NFR BLD: 28.9 % (ref 18–48)
MAGNESIUM SERPL-MCNC: 2 MG/DL (ref 1.6–2.6)
MCH RBC QN AUTO: 28.5 PG (ref 27–31)
MCHC RBC AUTO-ENTMCNC: 32.4 G/DL (ref 32–36)
MCV RBC AUTO: 88 FL (ref 82–98)
MONOCYTES # BLD AUTO: 0.3 K/UL (ref 0.3–1)
MONOCYTES NFR BLD: 10.3 % (ref 4–15)
NEUTROPHILS # BLD AUTO: 1.8 K/UL (ref 1.8–7.7)
NEUTROPHILS NFR BLD: 57.3 % (ref 38–73)
NRBC BLD-RTO: 0 /100 WBC
PHOSPHATE SERPL-MCNC: 4.1 MG/DL (ref 2.7–4.5)
PLATELET # BLD AUTO: 215 K/UL (ref 150–450)
PMV BLD AUTO: 10.5 FL (ref 9.2–12.9)
POCT GLUCOSE: 106 MG/DL (ref 70–110)
POCT GLUCOSE: 106 MG/DL (ref 70–110)
POCT GLUCOSE: 132 MG/DL (ref 70–110)
POTASSIUM SERPL-SCNC: 3.9 MMOL/L (ref 3.5–5.1)
PROT SERPL-MCNC: 5.8 G/DL (ref 6–8.4)
RBC # BLD AUTO: 3.23 M/UL (ref 4–5.4)
SODIUM SERPL-SCNC: 136 MMOL/L (ref 136–145)
WBC # BLD AUTO: 3.11 K/UL (ref 3.9–12.7)

## 2022-11-10 PROCEDURE — 63600175 PHARM REV CODE 636 W HCPCS

## 2022-11-10 PROCEDURE — 83735 ASSAY OF MAGNESIUM: CPT | Performed by: STUDENT IN AN ORGANIZED HEALTH CARE EDUCATION/TRAINING PROGRAM

## 2022-11-10 PROCEDURE — 99217 PR OBSERVATION CARE DISCHARGE: CPT | Mod: 95,,, | Performed by: INTERNAL MEDICINE

## 2022-11-10 PROCEDURE — 85025 COMPLETE CBC W/AUTO DIFF WBC: CPT | Performed by: STUDENT IN AN ORGANIZED HEALTH CARE EDUCATION/TRAINING PROGRAM

## 2022-11-10 PROCEDURE — 36415 COLL VENOUS BLD VENIPUNCTURE: CPT | Performed by: STUDENT IN AN ORGANIZED HEALTH CARE EDUCATION/TRAINING PROGRAM

## 2022-11-10 PROCEDURE — G0378 HOSPITAL OBSERVATION PER HR: HCPCS

## 2022-11-10 PROCEDURE — 84100 ASSAY OF PHOSPHORUS: CPT | Performed by: STUDENT IN AN ORGANIZED HEALTH CARE EDUCATION/TRAINING PROGRAM

## 2022-11-10 PROCEDURE — 25000003 PHARM REV CODE 250

## 2022-11-10 PROCEDURE — 99213 OFFICE O/P EST LOW 20 MIN: CPT | Mod: GC,,, | Performed by: SURGERY

## 2022-11-10 PROCEDURE — 94761 N-INVAS EAR/PLS OXIMETRY MLT: CPT

## 2022-11-10 PROCEDURE — 80053 COMPREHEN METABOLIC PANEL: CPT | Performed by: STUDENT IN AN ORGANIZED HEALTH CARE EDUCATION/TRAINING PROGRAM

## 2022-11-10 PROCEDURE — 96372 THER/PROPH/DIAG INJ SC/IM: CPT

## 2022-11-10 PROCEDURE — 99217 PR OBSERVATION CARE DISCHARGE: ICD-10-PCS | Mod: 95,,, | Performed by: INTERNAL MEDICINE

## 2022-11-10 PROCEDURE — 99213 PR OFFICE/OUTPT VISIT, EST, LEVL III, 20-29 MIN: ICD-10-PCS | Mod: GC,,, | Performed by: SURGERY

## 2022-11-10 RX ORDER — METHOCARBAMOL 500 MG/1
500 TABLET, FILM COATED ORAL 2 TIMES DAILY PRN
Qty: 20 TABLET | Refills: 0 | Status: SHIPPED | OUTPATIENT
Start: 2022-11-10 | End: 2022-11-20

## 2022-11-10 RX ORDER — METHOCARBAMOL 500 MG/1
500 TABLET, FILM COATED ORAL 2 TIMES DAILY PRN
Qty: 20 TABLET | Refills: 0 | Status: SHIPPED | OUTPATIENT
Start: 2022-11-10 | End: 2022-11-10 | Stop reason: SDUPTHER

## 2022-11-10 RX ADMIN — FUROSEMIDE 20 MG: 20 TABLET ORAL at 05:11

## 2022-11-10 RX ADMIN — FUROSEMIDE 20 MG: 20 TABLET ORAL at 06:11

## 2022-11-10 RX ADMIN — SPIRONOLACTONE 25 MG: 25 TABLET, FILM COATED ORAL at 10:11

## 2022-11-10 RX ADMIN — PRAVASTATIN SODIUM 40 MG: 40 TABLET ORAL at 10:11

## 2022-11-10 RX ADMIN — AZELASTINE HYDROCHLORIDE 137 MCG: 137 SPRAY, METERED NASAL at 10:11

## 2022-11-10 RX ADMIN — ACETAMINOPHEN 1000 MG: 500 TABLET ORAL at 10:11

## 2022-11-10 RX ADMIN — CHOLECALCIFEROL TAB 25 MCG (1000 UNIT) 1000 UNITS: 25 TAB at 10:11

## 2022-11-10 RX ADMIN — HEPARIN SODIUM 5000 UNITS: 5000 INJECTION INTRAVENOUS; SUBCUTANEOUS at 01:11

## 2022-11-10 RX ADMIN — HEPARIN SODIUM 5000 UNITS: 5000 INJECTION INTRAVENOUS; SUBCUTANEOUS at 06:11

## 2022-11-10 RX ADMIN — ASPIRIN 81 MG: 81 TABLET, COATED ORAL at 10:11

## 2022-11-10 NOTE — PLAN OF CARE
Problem: Adult Inpatient Plan of Care  Goal: Plan of Care Review  Outcome: Ongoing, Progressing     Problem: Adult Inpatient Plan of Care  Goal: Patient-Specific Goal (Individualized)  Outcome: Ongoing, Progressing     Problem: Adult Inpatient Plan of Care  Goal: Absence of Hospital-Acquired Illness or Injury  Outcome: Ongoing, Progressing     Problem: Adult Inpatient Plan of Care  Goal: Optimal Comfort and Wellbeing  Outcome: Ongoing, Progressing     Problem: Infection  Goal: Absence of Infection Signs and Symptoms  Outcome: Ongoing, Progressing     Problem: Fall Injury Risk  Goal: Absence of Fall and Fall-Related Injury  Outcome: Ongoing, Progressing

## 2022-11-10 NOTE — PLAN OF CARE
Pt accepted by Jessica ADAN.      11/10/22 3544   Post-Acute Status   Post-Acute Authorization Placement   Home Health Status Set-up Complete/Auth obtained   Discharge Plan   Discharge Plan A Home Health   Discharge Plan B Home Health     Srinivas Neely, RN,BSN

## 2022-11-10 NOTE — SUBJECTIVE & OBJECTIVE
Medications:  Continuous Infusions:  Scheduled Meds:   aspirin  81 mg Oral Daily    azelastine  1 spray Nasal BID    furosemide  20 mg Oral BID AC    heparin (porcine)  5,000 Units Subcutaneous Q8H    pravastatin  40 mg Oral Daily    spironolactone  25 mg Oral Daily    vitamin D  1,000 Units Oral Daily     PRN Meds:acetaminophen, acetaminophen, albuterol-ipratropium, aluminum-magnesium hydroxide-simethicone, bisacodyL, dextrose 10%, dextrose 10%, fluticasone propionate, glucagon (human recombinant), glucose, glucose, hydrOXYzine HCL, insulin aspart U-100, melatonin, naloxone, ondansetron, polyethylene glycol, prochlorperazine, psyllium husk (aspartame), simethicone, sodium chloride 0.9%       Objective:     Vital Signs (Most Recent):  Temp: 97.9 °F (36.6 °C) (11/10/22 0429)  Pulse: 69 (11/10/22 0429)  Resp: 18 (11/10/22 0429)  BP: 133/70 (11/10/22 0429)  SpO2: 98 % (11/10/22 0429) Vital Signs (24h Range):  Temp:  [96.9 °F (36.1 °C)-98.1 °F (36.7 °C)] 97.9 °F (36.6 °C)  Pulse:  [55-77] 69  Resp:  [18] 18  SpO2:  [93 %-98 %] 98 %  BP: (124-139)/(58-74) 133/70     Weight: 76.3 kg (168 lb 3.2 oz)  Body mass index is 27.99 kg/m².    Physical Exam  Vitals and nursing note reviewed.   Constitutional:       Appearance: Normal appearance.   HENT:      Head: Normocephalic and atraumatic.   Cardiovascular:      Rate and Rhythm: Normal rate and regular rhythm.      Comments: R femoral pulse 2+   L DP palpable   Extensive wound care bandage to RLE   Pulmonary:      Effort: Pulmonary effort is normal. No respiratory distress.   Abdominal:      General: Abdomen is flat. There is no distension.      Palpations: Abdomen is soft. There is no mass.      Tenderness: There is no abdominal tenderness.      Hernia: No hernia is present.   Musculoskeletal:      Right lower leg: No edema.      Left lower leg: No edema.   Skin:     General: Skin is warm and dry.      Findings: Lesion (see picture) present.   Neurological:      General: No  focal deficit present.      Mental Status: She is alert and oriented to person, place, and time.   Psychiatric:         Mood and Affect: Mood normal.         Behavior: Behavior normal.           Significant Labs:  CBC:   Recent Labs   Lab 11/10/22  0340   WBC 3.11*   RBC 3.23*   HGB 9.2*   HCT 28.4*      MCV 88   MCH 28.5   MCHC 32.4         Significant Diagnostics:  I have reviewed all pertinent imaging results/findings within the past 24 hours.    US arterial without abnormality     Venous duplex pending

## 2022-11-10 NOTE — CHAPLAIN
"Re-visited pt to see if she wanted to complete our LW form making her a DNR. She said she does not. Her LW at home was "completed by an  and is very good". She said she will bring a copy of it the next time she comes to [facility] for care so it can be scanned and made part of her EMR. Does not want to complete our Advance Directive form/s on this admission. If she winds up staying longer than expected, she said she will have her daughter bring her a copy from her house.       "

## 2022-11-10 NOTE — CONSULTS
[facility] [address] [place] [address] Medicine  Telemedicine Consult Note    Patient Name: Jenniffer Jimenez  MRN: [id number]  Admission Date: 11/7/2022  Hospital Length of Stay: 0 days  Attending Physician: Shilo Swift MD   Primary Care Provider: Rubi Young,          Thank you for your consult to [facility]. We have reviewed the patient chart. This patient does meet criteria for [facility] service at this time. Will assume care on 11/10/22 at 7AM.        Bhargavi Morales MD  Department of Hospital Medicine   [facility]

## 2022-11-10 NOTE — PROGRESS NOTES
[facility]  Vascular Surgery  Progress Note    Patient Name: Jenniffer Jimenez  MRN: [id number]  Admission Date: 11/7/2022  Primary Care Provider: Rubi Young DO    Subjective:     Interval History: NAEON  US arterial normal  No clinical changes     Post-Op Info:  * No surgery found *           Medications:  Continuous Infusions:  Scheduled Meds:   aspirin  81 mg Oral Daily    azelastine  1 spray Nasal BID    furosemide  20 mg Oral BID AC    heparin (porcine)  5,000 Units Subcutaneous Q8H    pravastatin  40 mg Oral Daily    spironolactone  25 mg Oral Daily    vitamin D  1,000 Units Oral Daily     PRN Meds:acetaminophen, acetaminophen, albuterol-ipratropium, aluminum-magnesium hydroxide-simethicone, bisacodyL, dextrose 10%, dextrose 10%, fluticasone propionate, glucagon (human recombinant), glucose, glucose, hydrOXYzine HCL, insulin aspart U-100, melatonin, naloxone, ondansetron, polyethylene glycol, prochlorperazine, psyllium husk (aspartame), simethicone, sodium chloride 0.9%       Objective:     Vital Signs (Most Recent):  Temp: 97.9 °F (36.6 °C) (11/10/22 0429)  Pulse: 69 (11/10/22 0429)  Resp: 18 (11/10/22 0429)  BP: 133/70 (11/10/22 0429)  SpO2: 98 % (11/10/22 0429) Vital Signs (24h Range):  Temp:  [96.9 °F (36.1 °C)-98.1 °F (36.7 °C)] 97.9 °F (36.6 °C)  Pulse:  [55-77] 69  Resp:  [18] 18  SpO2:  [93 %-98 %] 98 %  BP: (124-139)/(58-74) 133/70     Weight: 76.3 kg (168 lb 3.2 oz)  Body mass index is 27.99 kg/m².    Physical Exam  Vitals and nursing note reviewed.   Constitutional:       Appearance: Normal appearance.   HENT:      Head: Normocephalic and atraumatic.   Cardiovascular:      Rate and Rhythm: Normal rate and regular rhythm.      Comments: R femoral pulse 2+   L DP palpable   Extensive wound care bandage to RLE   Pulmonary:      Effort: Pulmonary effort is normal. No respiratory distress.   Abdominal:      General: Abdomen is flat. There is no distension.      Palpations:  Abdomen is soft. There is no mass.      Tenderness: There is no abdominal tenderness.      Hernia: No hernia is present.   Musculoskeletal:      Right lower leg: No edema.      Left lower leg: No edema.   Skin:     General: Skin is warm and dry.      Findings: Lesion (see picture) present.   Neurological:      General: No focal deficit present.      Mental Status: She is alert and oriented to person, place, and time.   Psychiatric:         Mood and Affect: Mood normal.         Behavior: Behavior normal.           Significant Labs:  CBC:   Recent Labs   Lab 11/10/22  0340   WBC 3.11*   RBC 3.23*   HGB 9.2*   HCT 28.4*      MCV 88   MCH 28.5   MCHC 32.4         Significant Diagnostics:  I have reviewed all pertinent imaging results/findings within the past 24 hours.    US arterial without abnormality     Venous duplex pending     Assessment/Plan:     * Non-healing wound of right lower extremity  89 yo F with above history, limited ambulation, and non-healing venous stasis ulcer to RLE.     - Okay to DC from vascular perspective after application of UNNA Boot with follow up in our vascular surgery and wound care clinic. We will set these follow up appointments.  - If VAS venous duplex imaging is delaying discharge, can obtain in outpatient setting prior to follow up.   - We will apply Unna Boot today              Afia Breaux MD  Vascular Surgery  [facility]

## 2022-11-10 NOTE — PLAN OF CARE
Referrals sent to Home health agencies via [employer].      11/10/22 9236   Post-Acute Status   Post-Acute Authorization Home Health   Home Health Status Referrals Sent   Discharge Plan   Discharge Plan A Home Health   Discharge Plan B Home Health     Srinivas Neely RN,BSN

## 2022-11-10 NOTE — ASSESSMENT & PLAN NOTE
89 yo F with above history, limited ambulation, and non-healing venous stasis ulcer to RLE.     - Okay to DC from vascular perspective after application of UNNA Boot with follow up in our vascular surgery and wound care clinic. We will set these follow up appointments.  - If VAS venous duplex imaging is delaying discharge, can obtain in outpatient setting prior to follow up.   - We will apply Unna Boot today

## 2022-11-10 NOTE — CARE UPDATE
Vascular Surgery Care Update     Paras Ugalde applied this am. Wound care follow up set up for next week.Vascular Surgery clinic follow up will be set up as well.     Okay to DC from our perspective. If VAS US Venous Leg Bilateral study is delaying discharge - can be obtained outpatient prior to clinic follow up.    We will sign off.     Afia Breaux MD  PGY-3, General Surgery  [facility]

## 2022-11-10 NOTE — PLAN OF CARE
[facility]      HOME HEALTH ORDERS  FACE TO FACE ENCOUNTER    Patient Name: Jenniffer Jimenez  YOB: 1932    PCP: Rubi Young DO   PCP Address: [address] [facility] / [place] [address]  PCP Phone Number: [phone]  PCP Fax: [phone]    Encounter Date: 11/7/22    Admit to Home Health    Diagnoses:  Active Hospital Problems    Diagnosis  POA    *Non-healing wound of right lower extremity [S81.801A]  Yes    Cellulitis of right lower extremity [L03.115]  Yes    Normocytic anemia [D64.9]  Yes    Venous insufficiency of both lower extremities [I87.2]  Yes    History of TIA (transient ischemic attack) [Z86.73]  Not Applicable    Chronic diastolic heart failure [I50.32]  Yes     Chronic     TTE (10/7/2022):    The left ventricle is normal in size with concentric remodeling and normal systolic function.  The estimated ejection fraction is 55-60%.  Grade III left ventricular diastolic dysfunction.  Mild mitral regurgitation.  Normal right ventricular size with mildly reduced right ventricular systolic function.  Severe tricuspid regurgitation.  The estimated PA systolic pressure is 52 mmHg. PASP may be under-estimated due to TR severity.  Elevated central venous pressure (15 mmHg).  There is pulmonary hypertension.  Severe left atrial enlargement.           Stage 3b chronic kidney disease [N18.32]  Yes    Chronic atrial fibrillation [I48.20]  Yes     Chronic    Meniere's disease [H81.09]  Yes    Primary hypertension [I10]  Yes     Chronic    Pure hypercholesterolemia [E78.00]  Yes     Chronic      Resolved Hospital Problems   No resolved problems to display.       Follow Up Appointments:   Follow-up Information       Rubi Young DO. Schedule an appointment as soon as possible for a visit in 1 week(s).    Specialty: Internal Medicine  Why: For discharge from hospital follow up  Contact information:  [address] [facility]  [place] [address]  [phone]           "     [facility]ASCULAR SURGERY. Schedule an appointment as soon as possible for a visit in 1 week(s).    Specialty: Vascular Surgery  Why: For discharge from hospital follow up, To establish care, Wound check  Contact information:  [address]  [facility] [address]  [phone]                         Future Appointments   Date Time Provider Department Center   11/11/2022  8:45 AM Hao Romero MD [facility] ORTHO [facility]   11/17/2022 11:00 AM Dede Chavez MD [facility] WOUND [facility]   3/30/2023 10:00 AM Rubi Young DO [facility] IM [place]       Allergies:  Review of patient's allergies indicates:   Allergen Reactions    Opioids - morphine analogues Other (See Comments)     Bowel issues; bowel obstruction    Tizanidine Other (See Comments)     "Lips were numb,  Almost passed out."    Tramadol Hallucinations    Beta-blockers (beta-adrenergic blocking agts) Other (See Comments)     Can not go on beta blockers for long period of time - due to taking allergy injections    Morphine     Opioids-meperidine and related        Medications: Review discharge medications with patient and family and provide education.    Current Facility-Administered Medications   Medication Dose Route Frequency Provider Last Rate Last Admin    acetaminophen tablet 1,000 mg  1,000 mg Oral Q8H PRN Lana Miramontes PA-C   1,000 mg at 11/10/22 1017    acetaminophen tablet 650 mg  650 mg Oral Q4H PRN Lana Miramontes PA-C        albuterol-ipratropium 2.5 mg-0.5 mg/3 mL nebulizer solution 3 mL  3 mL Nebulization Q4H PRN Lana Miramontes PA-C        aluminum-magnesium hydroxide-simethicone 200-200-20 mg/5 mL suspension 30 mL  30 mL Oral QID PRN Lana Miramontes PA-C        aspirin EC tablet 81 mg  81 mg Oral Daily Lana Miramontes PA-C   81 mg at 11/10/22 1000    azelastine 137 mcg (0.1 %) nasal spray 137 mcg  1 spray Nasal BID Lana Miramontes PA-C   137 mcg at 11/10/22 1000    bisacodyL suppository 10 mg  10 mg Rectal Daily " PRN Lana Miramontes PA-C        dextrose 10% bolus 125 mL  12.5 g Intravenous PRN TAYLOR Mc-KIMBERLEE        dextrose 10% bolus 250 mL  25 g Intravenous PRN TAYLOR Mc-KIMBERLEE        fluticasone propionate 50 mcg/actuation nasal spray 50 mcg  1 spray Each Nostril Daily PRN Shilo Swift MD        furosemide tablet 20 mg  20 mg Oral BID AC TAYLOR Mc-C   20 mg at 11/10/22 0603    glucagon (human recombinant) injection 1 mg  1 mg Intramuscular PRN Lana Miramontes PA-C        glucose chewable tablet 16 g  16 g Oral PRN Lana Miramontes PA-C        glucose chewable tablet 24 g  24 g Oral PRN Lana Miramontes PA-C        heparin (porcine) injection 5,000 Units  5,000 Units Subcutaneous Q8H TAYLOR Mc-C   5,000 Units at 11/10/22 1317    hydrOXYzine HCL tablet 25 mg  25 mg Oral TID PRN Lana Miramontes PA-C        insulin aspart U-100 pen 0-5 Units  0-5 Units Subcutaneous QID (AC + HS) PRN Lana Miramontes PA-C        melatonin tablet 6 mg  6 mg Oral Nightly PRN Lana Miramontes PA-C        naloxone 0.4 mg/mL injection 0.02 mg  0.02 mg Intravenous PRN Lana Miramontes PA-C        ondansetron disintegrating tablet 8 mg  8 mg Oral Q8H PRN Lana Miramontes PA-C        polyethylene glycol packet 17 g  17 g Oral BID PRN Lana Miramontes PA-C        pravastatin tablet 40 mg  40 mg Oral Daily TAYLOR Mc-C   40 mg at 11/10/22 1000    prochlorperazine injection Soln 5 mg  5 mg Intravenous Q6H PRN Lana Miramontes PA-C        psyllium husk (aspartame) 3.4 gram packet 1 packet  1 packet Oral Daily PRN Shilo Swift MD        simethicone chewable tablet 80 mg  1 tablet Oral QID PRN Lana Miramontes PA-C        sodium chloride 0.9% flush 5 mL  5 mL Intravenous PRN Lana Miramontes PA-C        spironolactone tablet 25 mg  25 mg Oral Daily Lana Miramontes PA-C   25 mg at 11/10/22 1000    vitamin D 1000 units tablet 1,000 Units  1,000 Units Oral Daily Lana Miramontes PA-C   1,000 Units at 11/10/22  1000     Current Discharge Medication List        CONTINUE these medications which have NOT CHANGED    Details   acetaminophen (TYLENOL) 500 MG tablet Take 1,000 mg by mouth 2 (two) times a day.      aspirin (ECOTRIN) 81 MG EC tablet Take 1 tablet (81 mg total) by mouth once daily.  Qty: 90 tablet, Refills: 3    Associated Diagnoses: Chronic atrial fibrillation      azelastine (ASTELIN) 137 mcg (0.1 %) nasal spray 1 spray (137 mcg total) by Nasal route 2 (two) times daily.  Qty: 30 mL, Refills: 1    Associated Diagnoses: Acute bacterial sinusitis      fluticasone propionate (FLONASE) 50 mcg/actuation nasal spray USE 1 SPRAY IN EACH NOSTRIL ONE TIME DAILY  Qty: 32 g, Refills: 11      furosemide (LASIX) 20 MG tablet Take 1 tablet (20 mg total) by mouth 2 (two) times daily before meals. Take twice a day for Weight greater than 160 lb, once a day for weights less than 160 lb  Qty: 180 tablet, Refills: 3    Associated Diagnoses: Chronic diastolic heart failure      guaiFENesin 100 mg/5 ml (ROBITUSSIN) 100 mg/5 mL syrup Take 15 mLs by mouth once daily.      pravastatin (PRAVACHOL) 40 MG tablet Take 1 tablet (40 mg total) by mouth once daily.  Qty: 90 tablet, Refills: 3    Associated Diagnoses: Pure hypercholesterolemia      spironolactone (ALDACTONE) 25 MG tablet Take twice a day for Weight greater than 160 lb, once a day for weights less than 160 lb  Qty: 180 tablet, Refills: 3    Comments: .  She will take 1 or 2 tablets daily as needed for swelling  Associated Diagnoses: Chronic diastolic heart failure; Essential hypertension      vit C/E/Zn/coppr/lutein/zeaxan (OCUVITE LUTEIN AND ZEAXANTHIN ORAL) Take 1 capsule by mouth once daily. Lutien 25 mg, Zeaxanthin 5 mg      vitamin E 400 UNIT capsule Take 400 Units by mouth once daily.      miconazole nitrate 2% (MICOTIN) 2 % Oint Apply topically every other day.  Qty: 57 g, Refills: 2      mupirocin (BACTROBAN) 2 % ointment Apply topically 3 (three) times daily. Apply  locally every other day  Qty: 22 g, Refills: 2           STOP taking these medications       cholecalciferol, vitamin D3, (VITAMIN D3 ORAL) Comments:   Reason for Stopping:         doxycycline (VIBRA-TABS) 100 MG tablet Comments:   Reason for Stopping:         hydrOXYzine HCL (ATARAX) 25 MG tablet Comments:   Reason for Stopping:         psyllium 0.52 gram capsule Comments:   Reason for Stopping:                 I have seen and examined this patient within the last 30 days. My clinical findings that support the need for the home health skilled services and home bound status are the following:no   Requiring assistive device to leave home due to unsteady gait caused by  Weakness/Debility.     Diet:   cardiac, < 2 gram sodium diet, and High Protein. Boost Plus BID     Labs:  Report Lab results to PCP.    Referrals/ Consults  Physical Therapy to evaluate and treat. Evaluate for home safety and equipment needs; Establish/upgrade home exercise program. Perform / instruct on therapeutic exercises, gait training, transfer training, and Range of Motion.  Occupational Therapy to evaluate and treat. Evaluate home environment for safety and equipment needs. Perform/Instruct on transfers, ADL training, ROM, and therapeutic exercises.    Activities:   activity as tolerated; keep RLE elevated    Nursing:   Agency to admit patient within 24 hours of hospital discharge unless specified on physician order or at patient request    SN to complete comprehensive assessment including routine vital signs. Instruct on disease process and s/s of complications to report to MD. Review/verify medication list sent home with the patient at time of discharge  and instruct patient/caregiver as needed. Frequency may be adjusted depending on start of care date.     Skilled nurse to perform up to 3 visits PRN for symptoms related to diagnosis    Notify MD if SBP > 160 or < 90; DBP > 90 or < 50; HR > 120 or < 50; Temp > 101; O2 < 88%    Ok to schedule  additional visits based on staff availability and patient request on consecutive days within the home health episode.    When multiple disciplines ordered:    Start of Care occurs on Sunday - Wednesday schedule remaining discipline evaluations as ordered on separate consecutive days following the start of care.    Thursday SOC -schedule subsequent evaluations Friday and Monday the following week.     Friday - Saturday SOC - schedule subsequent discipline evaluations on consecutive days starting Monday of the following week.    For all post-discharge communication and subsequent orders please contact patient's primary care physician. If unable to reach primary care physician or do not receive response within 30 minutes, please contact [facility] On Call RN for clinical staff order clarification      Home Health Aide:  Nursing Twice weekly, Physical Therapy Three times weekly, and Occupational Therapy Twice weekly    Wound Care Orders  Maintain UNNA Aftab RLE    I certify that this patient is confined to her home and needs intermittent skilled nursing care, physical therapy, and occupational therapy.      Bhargavi Morales MD

## 2022-11-10 NOTE — ASSESSMENT & PLAN NOTE
- Patient is identified as having Diastolic (HFpEF) heart failure that is Chronic.   - CHF is currently controlled.   - Latest ECHO performed and demonstrates- Results for orders placed during the hospital encounter of 10/07/22    Echo    Interpretation Summary  · The left ventricle is normal in size with concentric remodeling and normal systolic function.  · The estimated ejection fraction is 55-60%.  · Grade III left ventricular diastolic dysfunction.  · Mild mitral regurgitation.  · Normal right ventricular size with mildly reduced right ventricular systolic function.  · Severe tricuspid regurgitation.  · The estimated PA systolic pressure is 52 mmHg. PASP may be under-estimated due to TR severity.  · Elevated central venous pressure (15 mmHg).  · There is pulmonary hypertension.  · Severe left atrial enlargement.  .   - Continue Furosemide Aldactone and monitor clinical status closely.   - Monitor on telemetry.   - Patient is off CHF pathway.    - Monitor strict Is&Os and daily weights.    - Place on fluid restriction of 1.5 L.    - BLE swelling noted on exam. Patient denies SOB and lungs CTA. Swelling likely due to venous insufficiency

## 2022-11-10 NOTE — ASSESSMENT & PLAN NOTE
Vascular Surgery consulted  UNNA boot placed  Follow up in Vascular Surgery clinic in 1 week  Keep RLE elevated  Nutrition referral placed.

## 2022-11-10 NOTE — ASSESSMENT & PLAN NOTE
89 yo F with PMHx of CKD, HFpEF, A fib, Meniere's disease, venous insufficiency, HTN, HLD who presented to ED for worsening RLE cellulitis. Recent admission 1 month ago and treated with IV clindamycin and transitioned to po doxy and cefdinir on discharge. Follows with wound care and ID outpatient.  - SIRS 0/4  - Lactate 0.8  - Given Tylenol and IV vancomycin in ED  - RLE US negative for DVT  - recent wound cx performed on 11/02 with pseudomonas aeruginosa  - ID consulted: do not think this is infectious, recommended no antibiotcs, signed off  - Vascular surgery consulted for assistance; UNNA Boot placed  - wound care consulted  - Follow up with Vascular Surgery in 1 week

## 2022-11-10 NOTE — PLAN OF CARE
[facility]  Discharge Final Note    Primary Care Provider: Rubi Young DO    Expected Discharge Date: 11/10/2022    Future Appointments   Date Time Provider Department Center   11/11/2022  8:45 AM Hao Romero MD [facility] ORTHO [facility]   11/17/2022 11:00 AM Dede Chavez MD [facility] WOUND [facility]   3/30/2023 10:00 AM Rubi Young DO [facility] IM [place]       Pt discharged with referral to Home health.   Srinivas Neely, RN,BSN        Final Discharge Note (most recent)       Final Note - 11/10/22 1406          Final Note    Assessment Type Final Discharge Note     Anticipated Discharge Disposition Home-Health Care [facility]     Hospital Resources/Appts/Education Provided Provided patient/caregiver with written discharge plan information;Appointments scheduled and added to AVS        Post-Acute Status    Post-Acute Authorization Home Health     Home Health Status Referrals Sent     Discharge Delays None known at this time                     Important Message from Medicare             Contact Info       Rubi Young DO   Specialty: Internal Medicine   Relationship: PCP - General    [address] [facility] [place] [address]   Phone: [phone]       Next Steps: Schedule an appointment as soon as possible for a visit in 1 week(s)    Instructions: For discharge from hospital follow up    [facility] VASCULAR SURGERY   Specialty: Vascular Surgery    [address]  [facility] [address]   Phone: [phone]       Next Steps: Schedule an appointment as soon as possible for a visit in 1 week(s)    Instructions: For discharge from hospital follow up, To establish care, Wound check. The Clinic Nurse will contact you with a follow up appointment. If you do not hear from them within 48 hours, please call [phone]. Thank You

## 2022-11-10 NOTE — DISCHARGE SUMMARY
[facility] - Observation [address] [place] [address] Medicine  Telemedicine Discharge Summary      Patient Name: Jenniffer Jimenez  MRN: [id number]  Patient Class: OP- Observation  Admission Date: 11/7/2022  Hospital Length of Stay: 0 days  Discharge Date and Time:  11/10/2022 1:31 PM  Attending Physician: Bhargavi Morales MD   Discharging Provider: Bhargavi Morales MD  Primary Care Provider: Rubi Young DO      HPI:   Jenniffer Jimenez is a 89 yo F with PMHx of CKD, HFpEF, A fib, Meniere's disease, venous insufficiency, HTN, HLD who presented to ED for worsening RLE cellulitis. Wound was first noticed 2 months ago, for which she was then hospitalized at [facility] one month ago after failing po abx therapy (Augmentin -> keflex/doxy).  She was placed on IV clindamycin during that admission and then transitioned to po doxy and cefdinir on discharge. Since discharge, she has received OP wound care and follows with OP ID. Patient receives wound care every other day at home and was told by wound care nurse to go to ED for increased redness and serous drainage today. Wound was initially not painful, but she now admits soreness and cramping to RLE. Also admits L hip pain. Of note, patient has become increasingly dependent on walker for ambulation over the past couple of months. Patient denies fever, chills, chest pain, SOB, cough, abdominal pain, n/v/d, weakness.    In ED: Afebrile. VSS. Hgb 10.0 ->9.0. Lactate 0.8. Given tylenol and IV vanc.      * No surgery found *      Hospital Course:   Pt admitted to [facility]. ID consulted, who did not feel pt's non-healing RLE wound was due to infection: they recommended against abx, and signed off. Vascular surgery and wound care consulted for evaluation.     See Problems listed below for additional details of this hospital stay.      Goals of Care Treatment Preferences:  Code Status: Full Code      Consults:   Consults (From admission, onward)          Status Ordering Provider     Inpatient  virtual consult to Hospital Medicine  Once        Provider:  (Not yet assigned)    Completed NILE HUERTAS     IP consult to case management  Once        Provider:  (Not yet assigned)    Acknowledged NILE HUERTAS     Inpatient consult to Spiritual Care  Once        Provider:  (Not yet assigned)    Completed NILE HUERTAS     Inpatient consult to Vascular Surgery  Once        Provider:  (Not yet assigned)    Completed NILE HUERTAS     Inpatient consult to Infectious Diseases  Once        Provider:  (Not yet assigned)    Completed TRINIDAD JUAN            * Non-healing wound of right lower extremity  89 yo F with PMHx of CKD, HFpEF, A fib, Meniere's disease, venous insufficiency, HTN, HLD who presented to ED for worsening RLE cellulitis. Recent admission 1 month ago and treated with IV clindamycin and transitioned to po doxy and cefdinir on discharge. Follows with wound care and ID outpatient.  - SIRS 0/4  - Lactate 0.8  - Given Tylenol and IV vancomycin in ED  - RLE US negative for DVT  - recent wound cx performed on 11/02 with pseudomonas aeruginosa  - ID consulted: do not think this is infectious, recommended no antibiotcs, signed off  - Vascular surgery consulted for assistance; UNNA Boot placed  - wound care consulted  - Follow up with Vascular Surgery in 1 week    Cellulitis of right lower extremity  - Resolved; no additional antibiotics needed at this time    Normocytic anemia  - Hgb 10 to 9.0 on admit (baseline ~10)  - H&H stable    Venous insufficiency of both lower extremities  Vascular Surgery consulted  UNNA boot placed  Follow up in Vascular Surgery clinic in 1 week  Keep RLE elevated  Nutrition referral placed.    History of TIA (transient ischemic attack)  Continue ASA    Chronic diastolic heart failure  - Patient is identified as having Diastolic (HFpEF) heart failure that is Chronic.   - CHF is currently controlled.   - Latest ECHO performed and demonstrates- Results for orders placed  during the hospital encounter of 10/07/22    Echo    Interpretation Summary  · The left ventricle is normal in size with concentric remodeling and normal systolic function.  · The estimated ejection fraction is 55-60%.  · Grade III left ventricular diastolic dysfunction.  · Mild mitral regurgitation.  · Normal right ventricular size with mildly reduced right ventricular systolic function.  · Severe tricuspid regurgitation.  · The estimated PA systolic pressure is 52 mmHg. PASP may be under-estimated due to TR severity.  · Elevated central venous pressure (15 mmHg).  · There is pulmonary hypertension.  · Severe left atrial enlargement.  .   - Continue Furosemide Aldactone and monitor clinical status closely.   - Monitor on telemetry.   - Patient is off CHF pathway.    - Monitor strict Is&Os and daily weights.    - Place on fluid restriction of 1.5 L.    - BLE swelling noted on exam. Patient denies SOB and lungs CTA. Swelling likely due to venous insufficiency    Stage 3b chronic kidney disease  - Cr 1.3 on admit  - renally dose meds  - avoid nephrotoxic meds    Chronic atrial fibrillation  - s/p Watchman  - EKG - A-fib, rate controlled   - continue home ASA    Meniere's disease  - appointment with allergy/immunology scheduled during hospital stay; will need to be rescheduled    Primary hypertension  - controlled   - continue home Lasix and aldactone  - Low Na diet    Pure hypercholesterolemia  - continue home statin      Final Active Diagnoses:    Diagnosis Date Noted POA    PRINCIPAL PROBLEM:  Non-healing wound of right lower extremity [S81.801A] 11/08/2022 Yes    Cellulitis of right lower extremity [L03.115] 10/07/2022 Yes    Normocytic anemia [D64.9] 12/17/2020 Yes    Venous insufficiency of both lower extremities [I87.2] 11/22/2019 Yes    History of TIA (transient ischemic attack) [Z86.73] 05/04/2016 Not Applicable    Chronic diastolic heart failure [I50.32] 05/04/2016 Yes     Chronic    Stage 3b chronic kidney  disease [N18.32] 05/04/2016 Yes    Chronic atrial fibrillation [I48.20] 01/29/2016 Yes     Chronic    Meniere's disease [H81.09] 04/02/2014 Yes    Primary hypertension [I10]  Yes     Chronic    Pure hypercholesterolemia [E78.00] 07/02/2012 Yes     Chronic      Problems Resolved During this Admission:       Discharged Condition: stable    Disposition: Home-Health Care [facility]    Follow Up:   Follow-up Information       Rubi Young DO. Go to.    Specialty: Internal Medicine  Why: As Scheduled  Contact information:  [address] [facility]  [place] [address]  [phone]               [facility] VASCULAR SURGERY. Schedule an appointment as soon as possible for a visit in 1 week(s).    Specialty: Vascular Surgery  Why: For discharge from hospital follow up, To establish care, Wound check  Contact information:  Ele[address] Fried  [facility] [address]  [phone]                         Future Appointments   Date Time Provider Department Center   11/11/2022  8:45 AM Hao Romero MD [facility]   11/17/2022 11:00 AM Dede Chavez MD [facility] WOUND [facility]   3/30/2023 10:00 AM Rubi Young DO [facility] [place]       Patient Instructions:      Ambulatory referral/consult to Vascular Surgery   Standing Status: Future   Referral Priority: Urgent Referral Type: Consultation   Referral Reason: Specialty Services Required   Requested Specialty: Vascular Surgery   Number of Visits Requested: 1     Ambulatory referral/consult to Nutrition Services   Standing Status: Future   Referral Priority: Urgent Referral Type: Consultation   Referral Reason: Specialty Services Required   Requested Specialty: Nutrition   Number of Visits Requested: 1     Diet Cardiac   Order Comments: High Protein and Low Sodium     Keep surgical extremity elevated   Order Comments: Keep RLE elevated     Notify your health care provider if you experience any of the following:  temperature >100.4     Notify your health  care provider if you experience any of the following:  persistent nausea and vomiting or diarrhea     Notify your health care provider if you experience any of the following:  difficulty breathing or increased cough     Notify your health care provider if you experience any of the following:  severe persistent headache     Notify your health care provider if you experience any of the following:  increased confusion or weakness     Notify your health care provider if you experience any of the following:  persistent dizziness, light-headedness, or visual disturbances     Leave dressing on - Keep it clean, dry, and intact until clinic visit     Activity as tolerated       Significant Diagnostic Studies:  Recent Labs   Lab 09/20/22  0818   HGBA1C 6.0*     Recent Labs   Lab 11/08/22  0501 11/09/22  0531 11/10/22  0340   WBC 4.00 3.68* 3.11*   HGB 9.0* 8.9* 9.2*   HCT 26.2* 27.4* 28.4*    174 215     Recent Labs   Lab 11/08/22  0501 11/09/22  0531 11/10/22  0340   GRAN 67.3  2.7 67.9  2.5 57.3  1.8   LYMPH 17.8*  0.7* 19.0  0.7* 28.9  0.9*   MONO 12.8  0.5 10.3  0.4 10.3  0.3   EOS 0.1 0.1 0.1     Recent Labs   Lab 11/08/22  0501 11/09/22  0531 11/10/22  0340    139 136   K 4.4 4.2 3.9    109 103   CO2 22* 23 23   BUN 29* 29* 31*   CREATININE 1.3 1.3 1.2   * 115* 102   CALCIUM 8.7 8.3* 9.0   ALBUMIN 3.0* 2.7* 2.9*   MG 2.2 2.1 2.0   PHOS 3.9 3.9 4.1     Recent Labs   Lab 11/08/22  0501 11/09/22  0531 11/10/22  0340   ALKPHOS 117 108 114   ALT 10 7* 8*   AST 16 10 10   PROT 6.0 5.5* 5.8*   BILITOT 0.5 0.3 0.4     Recent Labs   Lab 09/25/22  2039 10/07/22  1601 10/07/22  2354 10/09/22  0739 10/25/22  1228 11/08/22  0028   PROCAL  --   --  0.06  --  0.04  --    LACTATE  --  1.3  --   --   --  0.8   FERRITIN  --   --  91  --   --   --    SEDRATE 29  --   --  18  --   --      SARS-CoV2 (COVID-19) Qualitative PCR (no units)   Date Value   12/14/2020 Not Detected   11/02/2020 Not Detected    10/06/2020 Not Detected   08/03/2020 Not Detected     SARS-CoV-2 RNA, Amplification, Qual (no units)   Date Value   06/25/2020 Negative       ECG Results              EKG 12-lead (Final result)  Result time 11/08/22 12:26:00      Final result by Interface, Lab In [facility] (11/08/22 12:26:00)                   Narrative:    Test Reason : I48.91,    Vent. Rate : 070 BPM     Atrial Rate : 000 BPM     P-R Int : 000 ms          QRS Dur : 086 ms      QT Int : 412 ms       P-R-T Axes : 000 085 058 degrees     QTc Int : 444 ms    Atrial fibrillation  Abnormal ECG  When compared with ECG of 25-SEP-2022 20:37,  No significant change was found  Confirmed by Raaf OTTO MD ([id number]) on 11/8/2022 12:25:43 PM    Referred By: [email or web address]   SELF           Confirmed By:Rafa OTTO MD                                    Results for orders placed during the hospital encounter of 10/07/22    Echo    Interpretation Summary  · The left ventricle is normal in size with concentric remodeling and normal systolic function.  · The estimated ejection fraction is 55-60%.  · Grade III left ventricular diastolic dysfunction.  · Mild mitral regurgitation.  · Normal right ventricular size with mildly reduced right ventricular systolic function.  · Severe tricuspid regurgitation.  · The estimated PA systolic pressure is 52 mmHg. PASP may be under-estimated due to TR severity.  · Elevated central venous pressure (15 mmHg).  · There is pulmonary hypertension.  · Severe left atrial enlargement.      US Lower Extremity Arteries Right  Narrative: EXAMINATION:  Right lower extremity arterial Doppler study    CLINICAL HISTORY:  Evaluate for peripheral arterial disease.    COMPARISON:  No priors.    FINDINGS:  Peak systolic velocities of the RIGHT lower extremity were obtained, as follows:    Common femoral: 94 cm/sec monophasic waveform.    Deep femoral artery : 55 cm/sec  biphasic waveform.    Proximal superficial femoral: 113 cm/sec  monophasic waveform.    Mid  superficial femoral: 156 cm/sec  monophasic waveform.    Distal superficial femoral: 130 cm/sec  monophasic waveform.    Proximal popliteal: 108 cm/sec  monophasic waveform.    Distal popliteal: 119 cm/sec  monophasic waveform.    Posterior tibial: 108 cm/sec  monophasic waveform.    Anterior tibial: 125 cm/sec  monophasic waveform.  Impression: Right lower extremity arteries are patent noting scattered atherosclerotic plaque.  No evidence of focal hemodynamically significant stenosis.  Monophasic waveforms throughout the majority of the right lower extremity suggestive of proximal disease.  Further assessment could be performed with CTA abdomen/pelvis if clinically warranted.    Electronically signed by resident: Alfonso Fitch  Date:    11/09/2022  Time:    15:53    Electronically signed by: Barrie Garza  Date:    11/09/2022  Time:    16:05  Ankle Brachial Indices (HOLLY)  · Resting HOLLY's are normal.  · PVR's are biphasic throughout.          Medications:  Reconciled Home Medications:      Medication List        START taking these medications      methocarbamoL 500 MG Tab  Commonly known as: ROBAXIN  Take 1 tablet (500 mg total) by mouth 2 (two) times daily as needed (muscle cramps).            CONTINUE taking these medications      acetaminophen 500 MG tablet  Commonly known as: TYLENOL  Take 1,000 mg by mouth 2 (two) times a day.     aspirin 81 MG EC tablet  Commonly known as: ECOTRIN  Take 1 tablet (81 mg total) by mouth once daily.     azelastine 137 mcg (0.1 %) nasal spray  Commonly known as: ASTELIN  1 spray (137 mcg total) by Nasal route 2 (two) times daily.     CRITIC-AID CLEAR AF(MICONAZOL) 2 % Oint  Generic drug: miconazole nitrate 2%  Apply topically every other day.     fluticasone propionate 50 mcg/actuation nasal spray  Commonly known as: FLONASE  USE 1 SPRAY IN EACH NOSTRIL ONE TIME DAILY     furosemide 20 MG tablet  Commonly known as: LASIX  Take 1 tablet (20 mg total) by mouth 2 (two) times  daily before meals. Take twice a day for Weight greater than 160 lb, once a day for weights less than 160 lb     guaiFENesin 100 mg/5 ml 100 mg/5 mL syrup  Commonly known as: ROBITUSSIN  Take 15 mLs by mouth once daily.     mupirocin 2 % ointment  Commonly known as: BACTROBAN  Apply topically 3 (three) times daily. Apply locally every other day     OCUVITE LUTEIN AND ZEAXANTHIN ORAL  Take 1 capsule by mouth once daily. Lutien 25 mg, Zeaxanthin 5 mg     pravastatin 40 MG tablet  Commonly known as: PRAVACHOL  Take 1 tablet (40 mg total) by mouth once daily.     spironolactone 25 MG tablet  Commonly known as: ALDACTONE  Take twice a day for Weight greater than 160 lb, once a day for weights less than 160 lb     vitamin E 400 UNIT capsule  Take 400 Units by mouth once daily.            STOP taking these medications      doxycycline 100 MG tablet  Commonly known as: VIBRA-TABS              Indwelling Lines/Drains at time of discharge:   Lines/Drains/Airways       None       Time spent on the discharge of patient: 45 minutes  This service was provided by Virtual Visit.   Patient was seen and examined on the date of discharge.  Additional time was spent speaking with consultants and case management, reviewing records, and/or discussing the plan of care with patient/family.  The patient location is: 1111/1111 A  Admitted 11/7/2022 11:31 PM  Present with the patient at the time of the telemed/virtual assessment: Telepresenter    Patient was transferred to [facility] Medicine on:  11/10/22   This document was prepared by chart review and may not directly reflect my personal knowledge of the patient's case, clinical course, or significant events during the hospital stay.    The attending portion of this evaluation, treatment, and documentation was performed per Bhargavi Morales MD via Telemedicine AudioVisual using the secure Watchfinder software platform with 2 way audio/video. The provider was located off-site and the  patient is located in the hospital. The aforementioned video software was utilized to document the relevant history and physical exam    Bhargavi Morales MD  Department of Hospital Medicine  [facility] - Observation 11H

## 2022-11-11 ENCOUNTER — OUTPATIENT CASE MANAGEMENT (OUTPATIENT)
Dept: ADMINISTRATIVE | Facility: OTHER | Age: 87
End: 2022-11-11
Payer: MEDICARE

## 2022-11-11 NOTE — NURSING
Discharge instructions reviewed with patient. Patient verbalized understanding. Questions answered/encouraged at this time. Patient awaiting transport. Patient stated that her daughter will be picking her up. Patient denies concerns at this time.

## 2022-11-11 NOTE — PROGRESS NOTES
Outpatient Care Management  Plan of Care Follow Up Visit    Patient: Jenniffer Jimenez  MRN: [id number]  Date of Service: 11/11/2022  Completed by: Lima Washington RN  Referral Date: 10/10/2022    Reason for Visit   Patient presents with    OPCM Chart Review     11/11/2022    OPCM RN First Follow-Up Attempt     11/11/2022  Patient unavailable to talk due to  nurse calling.  Will call back later this afternoon.    Follow-up     11/15/2022    Update Plan Of Care     11/15/2022       Brief Summary: Patient states she weighs herself everyday when she is at home.  She was in the hospital from 11/11-11/14 and the last time they weighed her was 11/12 she was 160 lbs.  Today she is weighing in at 159.2.  She has not had to contact her doctor because she hasn't gained weight.  Patient states the signs and symptoms of CHF are dizziness, shortness of breath and lightheadedness.  She states she doesn't remember what else.  She states she has been taking her Lasix and Aldactone twice a day but will now decrease to once a day since her weight is below 160, per MD orders.  Patient states she is following a low sodium diet.  She states she follows a 1700 ml fluid restriction.  She states in addition to eating, she drinks two Boosts a day to help with protein intake.  Patient states her right leg wound dressing is dry and intact at this time.  She recently had two hospital stays due to wound drainage, copious amounts, just discharged yesterday from the second hospital stay.  She states she has [facility] and the nurse is supposed to change the dressing a couple of times a week.  She states she is eating a high protein diet to help with wound healing.  She states she elevates her leg when she can but stays busy with house cleaning and cannot keep propping her leg up constantly.  She has too much to do.    CM reiterated to patient to call PCP/cardiologist if she gains 3 lbs in one day or 5 lbs in one week.  CM also  reiterated signs of weight gain like shortness of breath, swelling/puffiness to feet, ankles, and legs, tightness of clothing.  CM reiterated the signs and symptoms of CHF including SOB; new or worsening cough especially if your cough is bloody or frothy; worsened swelling in your legs or ankles; fast or irregular heartbeat.  CM reviewed what processed meats are and that they are high in sodium.  CM also mailed three Ensure coupons to patient's home.  CM reviewed self care steps with patient including maintain nutrition and hydration, proper handwashing and personal hygiene, follow wound care instructions provided, take antibiotic prescription as prescribed.  CM also reviewed Bob supplements with patient that is specifically formulated for wound healing.  CM mailed two Bob coupons to patient's home.  CM encouraged patient to elevate leg as MD has instructed patient to do.    Next steps:   Follow up in three weeks  Follow up on 3 lb/5 lb rule  Follow up on S&S of CHF  Follow up on low sodium diet  Follow up on daily weights and trends  Follow up if taking diuretic medication  Follow up if following 1700 ml fluid restriction  Follow up if avoiding processed foods  Follow up on self care steps for wounds  Follow up if got Bob  Educate on S&S of wound infection    Follow up in about 25 days (around 12/6/2022).    Patient Summary     Involvement of Care:  Do I have permission to speak with other family members about your care?  Yes    Patient Reported Labs & Vitals:  1.  Any Patient Reported Labs & Vitals?  Yes  2.  Patient Reported Blood Pressure:  NA  3.  Patient Reported Pulse:  NA  4.  Patient Reported Weight (Kg):  159.2  5.  Patient Reported Blood Glucose (mg/dl):  NA    Medical and social history was reviewed with patient and/or caregiver.     Clinical Assessment     Reviewed and provided basic information on available community resources for mental health, transportation, wellness resources, and palliative  care programs with patient and/or caregiver.     Complex Care Plan     Care plan was discussed and completed today with input from patient and/or caregiver.    Patient Instructions     Instructions were provided via the [employer] patient resources and are available for the patient to view on the patient portal.    Todays OPCM Self-Management Care Plan was developed with the patients/caregivers input and was based on identified barriers from todays assessment.  Goals were written today with the patient/caregiver and the patient has agreed to work towards these goals to improve his/her overall well-being. Patient verbalized understanding of the care plan, goals, and all of today's instructions. Encouraged patient/caregiver to communicate with his/her physician and health care team about health conditions and the treatment plan.  Provided my contact information today and encouraged patient/caregiver to call me with any questions as needed.

## 2022-11-11 NOTE — PLAN OF CARE
MARICRUZ spoke with Domonique at [facility] who reported that pt is a current pt of theirs receiving home health services. Domonique reported that she cancelled the referral to [facility] and [facility] will resume services with pt.    Deyanira Ochoa LMSW  [facility]  Ext. [phone]

## 2022-11-11 NOTE — LETTER
November 15, 2022    Jenniffer Liam Jimenez  [address]  [place] [address]             Outpatient Case Management  [address] [facility] [place] [address] Dear Ms Abad,    Here is some educational information for you about your wound.       If you need anything please give me, Rona Washington, a call Monday through Friday from 8:00-4:30 PM at [phone].     The Outpatient Case Management Department can be reached at [phone] from 8:00AM to 4:30 PM on Monday thru Friday. [facility] also has a program where a nurse is available 24/7 to answer questions or provide medical advice, their number is [phone].      Thanks,    Rona Washington RN  RN Case Manager  Outpatient Complex Care Management  [facility]  [phone]  [email or web address]

## 2022-11-11 NOTE — PLAN OF CARE
Problem: Adult Inpatient Plan of Care  Goal: Plan of Care Review  Outcome: Met  Goal: Patient-Specific Goal (Individualized)  Outcome: Met  Goal: Absence of Hospital-Acquired Illness or Injury  Outcome: Met  Goal: Optimal Comfort and Wellbeing  Outcome: Met  Goal: Readiness for Transition of Care  Outcome: Met     Problem: Infection  Goal: Absence of Infection Signs and Symptoms  Outcome: Met     Problem: Fall Injury Risk  Goal: Absence of Fall and Fall-Related Injury  Outcome: Met     Problem: Diabetes Comorbidity  Goal: Blood Glucose Level Within Targeted Range  Outcome: Met     Problem: Impaired Wound Healing  Goal: Optimal Wound Healing  Outcome: Met

## 2022-11-12 ENCOUNTER — DOCUMENT SCAN (OUTPATIENT)
Dept: HOME HEALTH SERVICES | Facility: HOSPITAL | Age: 87
End: 2022-11-12
Payer: MEDICARE

## 2022-11-12 ENCOUNTER — EXTERNAL HOME HEALTH (OUTPATIENT)
Dept: HOME HEALTH SERVICES | Facility: HOSPITAL | Age: 87
End: 2022-11-12
Payer: MEDICARE

## 2022-11-12 ENCOUNTER — NURSE TRIAGE (OUTPATIENT)
Dept: ADMINISTRATIVE | Facility: CLINIC | Age: 87
End: 2022-11-12
Payer: MEDICARE

## 2022-11-12 ENCOUNTER — HOSPITAL ENCOUNTER (OUTPATIENT)
Facility: HOSPITAL | Age: 87
Discharge: HOME | End: 2022-11-14
Attending: EMERGENCY MEDICINE | Admitting: EMERGENCY MEDICINE
Payer: MEDICARE

## 2022-11-12 DIAGNOSIS — Z78.9 UNABLE TO CARE FOR SELF: ICD-10-CM

## 2022-11-12 DIAGNOSIS — Z51.89 VISIT FOR WOUND CHECK: ICD-10-CM

## 2022-11-12 DIAGNOSIS — R07.9 CHEST PAIN: ICD-10-CM

## 2022-11-12 DIAGNOSIS — S81.801A WOUND OF RIGHT LOWER EXTREMITY: Primary | ICD-10-CM

## 2022-11-12 LAB
ALBUMIN SERPL BCP-MCNC: 3.4 G/DL (ref 3.5–5.2)
ALP SERPL-CCNC: 125 U/L (ref 55–135)
ALT SERPL W/O P-5'-P-CCNC: 13 U/L (ref 10–44)
ANION GAP SERPL CALC-SCNC: 14 MMOL/L (ref 8–16)
AST SERPL-CCNC: 21 U/L (ref 10–40)
BASOPHILS # BLD AUTO: 0.03 K/UL (ref 0–0.2)
BASOPHILS NFR BLD: 0.7 % (ref 0–1.9)
BILIRUB SERPL-MCNC: 0.4 MG/DL (ref 0.1–1)
BUN SERPL-MCNC: 37 MG/DL (ref 8–23)
CALCIUM SERPL-MCNC: 9.5 MG/DL (ref 8.7–10.5)
CHLORIDE SERPL-SCNC: 104 MMOL/L (ref 95–110)
CO2 SERPL-SCNC: 23 MMOL/L (ref 23–29)
CREAT SERPL-MCNC: 1.2 MG/DL (ref 0.5–1.4)
CRP SERPL-MCNC: 8.8 MG/L (ref 0–8.2)
DIFFERENTIAL METHOD: ABNORMAL
EOSINOPHIL # BLD AUTO: 0.1 K/UL (ref 0–0.5)
EOSINOPHIL NFR BLD: 1.4 % (ref 0–8)
ERYTHROCYTE [DISTWIDTH] IN BLOOD BY AUTOMATED COUNT: 15.5 % (ref 11.5–14.5)
ERYTHROCYTE [SEDIMENTATION RATE] IN BLOOD BY PHOTOMETRIC METHOD: 64 MM/HR (ref 0–36)
EST. GFR  (NO RACE VARIABLE): 43 ML/MIN/1.73 M^2
GLUCOSE SERPL-MCNC: 83 MG/DL (ref 70–110)
HCT VFR BLD AUTO: 30.7 % (ref 37–48.5)
HGB BLD-MCNC: 10.1 G/DL (ref 12–16)
IMM GRANULOCYTES # BLD AUTO: 0.03 K/UL (ref 0–0.04)
IMM GRANULOCYTES NFR BLD AUTO: 0.7 % (ref 0–0.5)
LACTATE SERPL-SCNC: 1 MMOL/L (ref 0.5–2.2)
LYMPHOCYTES # BLD AUTO: 1.1 K/UL (ref 1–4.8)
LYMPHOCYTES NFR BLD: 25.9 % (ref 18–48)
MCH RBC QN AUTO: 29.4 PG (ref 27–31)
MCHC RBC AUTO-ENTMCNC: 32.9 G/DL (ref 32–36)
MCV RBC AUTO: 89 FL (ref 82–98)
MONOCYTES # BLD AUTO: 0.5 K/UL (ref 0.3–1)
MONOCYTES NFR BLD: 10.7 % (ref 4–15)
NEUTROPHILS # BLD AUTO: 2.6 K/UL (ref 1.8–7.7)
NEUTROPHILS NFR BLD: 60.6 % (ref 38–73)
NRBC BLD-RTO: 0 /100 WBC
PLATELET # BLD AUTO: 243 K/UL (ref 150–450)
PMV BLD AUTO: 9.9 FL (ref 9.2–12.9)
POCT GLUCOSE: 78 MG/DL (ref 70–110)
POTASSIUM SERPL-SCNC: 5.1 MMOL/L (ref 3.5–5.1)
PROT SERPL-MCNC: 6.9 G/DL (ref 6–8.4)
RBC # BLD AUTO: 3.44 M/UL (ref 4–5.4)
SODIUM SERPL-SCNC: 141 MMOL/L (ref 136–145)
WBC # BLD AUTO: 4.21 K/UL (ref 3.9–12.7)

## 2022-11-12 PROCEDURE — 86140 C-REACTIVE PROTEIN: CPT | Performed by: EMERGENCY MEDICINE

## 2022-11-12 PROCEDURE — 99285 EMERGENCY DEPT VISIT HI MDM: CPT | Mod: ,,, | Performed by: EMERGENCY MEDICINE

## 2022-11-12 PROCEDURE — 99285 EMERGENCY DEPT VISIT HI MDM: CPT | Mod: 25

## 2022-11-12 PROCEDURE — 80053 COMPREHEN METABOLIC PANEL: CPT | Performed by: EMERGENCY MEDICINE

## 2022-11-12 PROCEDURE — G0378 HOSPITAL OBSERVATION PER HR: HCPCS

## 2022-11-12 PROCEDURE — 85652 RBC SED RATE AUTOMATED: CPT | Performed by: EMERGENCY MEDICINE

## 2022-11-12 PROCEDURE — 63600175 PHARM REV CODE 636 W HCPCS: Performed by: NURSE PRACTITIONER

## 2022-11-12 PROCEDURE — 96372 THER/PROPH/DIAG INJ SC/IM: CPT | Performed by: NURSE PRACTITIONER

## 2022-11-12 PROCEDURE — 99220 PR INITIAL OBSERVATION CARE,LEVL III: CPT | Mod: ,,, | Performed by: NURSE PRACTITIONER

## 2022-11-12 PROCEDURE — 83605 ASSAY OF LACTIC ACID: CPT | Performed by: EMERGENCY MEDICINE

## 2022-11-12 PROCEDURE — 25000003 PHARM REV CODE 250: Performed by: NURSE PRACTITIONER

## 2022-11-12 PROCEDURE — 99220 PR INITIAL OBSERVATION CARE,LEVL III: ICD-10-PCS | Mod: ,,, | Performed by: NURSE PRACTITIONER

## 2022-11-12 PROCEDURE — 85025 COMPLETE CBC W/AUTO DIFF WBC: CPT | Performed by: EMERGENCY MEDICINE

## 2022-11-12 PROCEDURE — 99285 PR EMERGENCY DEPT VISIT,LEVEL V: ICD-10-PCS | Mod: ,,, | Performed by: EMERGENCY MEDICINE

## 2022-11-12 RX ORDER — TALC
6 POWDER (GRAM) TOPICAL NIGHTLY PRN
Status: DISCONTINUED | OUTPATIENT
Start: 2022-11-12 | End: 2022-11-12

## 2022-11-12 RX ORDER — TALC
6 POWDER (GRAM) TOPICAL NIGHTLY PRN
Status: DISCONTINUED | OUTPATIENT
Start: 2022-11-12 | End: 2022-11-14 | Stop reason: HOSPADM

## 2022-11-12 RX ORDER — HEPARIN SODIUM 5000 [USP'U]/ML
5000 INJECTION, SOLUTION INTRAVENOUS; SUBCUTANEOUS EVERY 8 HOURS
Status: DISCONTINUED | OUTPATIENT
Start: 2022-11-12 | End: 2022-11-14 | Stop reason: HOSPADM

## 2022-11-12 RX ORDER — ONDANSETRON 8 MG/1
8 TABLET, ORALLY DISINTEGRATING ORAL EVERY 8 HOURS PRN
Status: DISCONTINUED | OUTPATIENT
Start: 2022-11-12 | End: 2022-11-14 | Stop reason: HOSPADM

## 2022-11-12 RX ORDER — MAG HYDROX/ALUMINUM HYD/SIMETH 200-200-20
30 SUSPENSION, ORAL (FINAL DOSE FORM) ORAL 4 TIMES DAILY PRN
Status: DISCONTINUED | OUTPATIENT
Start: 2022-11-12 | End: 2022-11-14 | Stop reason: HOSPADM

## 2022-11-12 RX ORDER — IBUPROFEN 200 MG
16 TABLET ORAL
Status: DISCONTINUED | OUTPATIENT
Start: 2022-11-12 | End: 2022-11-14 | Stop reason: HOSPADM

## 2022-11-12 RX ORDER — BISACODYL 10 MG
10 SUPPOSITORY, RECTAL RECTAL DAILY PRN
Status: DISCONTINUED | OUTPATIENT
Start: 2022-11-12 | End: 2022-11-14 | Stop reason: HOSPADM

## 2022-11-12 RX ORDER — ASPIRIN 81 MG/1
81 TABLET ORAL DAILY
Status: DISCONTINUED | OUTPATIENT
Start: 2022-11-13 | End: 2022-11-14 | Stop reason: HOSPADM

## 2022-11-12 RX ORDER — FLUTICASONE PROPIONATE 50 MCG
1 SPRAY, SUSPENSION (ML) NASAL DAILY PRN
Status: DISCONTINUED | OUTPATIENT
Start: 2022-11-12 | End: 2022-11-14 | Stop reason: HOSPADM

## 2022-11-12 RX ORDER — PROCHLORPERAZINE EDISYLATE 5 MG/ML
5 INJECTION INTRAMUSCULAR; INTRAVENOUS EVERY 6 HOURS PRN
Status: DISCONTINUED | OUTPATIENT
Start: 2022-11-12 | End: 2022-11-14 | Stop reason: HOSPADM

## 2022-11-12 RX ORDER — AZELASTINE 1 MG/ML
1 SPRAY, METERED NASAL 2 TIMES DAILY
Status: DISCONTINUED | OUTPATIENT
Start: 2022-11-12 | End: 2022-11-14 | Stop reason: HOSPADM

## 2022-11-12 RX ORDER — SIMETHICONE 80 MG
1 TABLET,CHEWABLE ORAL 4 TIMES DAILY PRN
Status: DISCONTINUED | OUTPATIENT
Start: 2022-11-12 | End: 2022-11-14 | Stop reason: HOSPADM

## 2022-11-12 RX ORDER — CHOLECALCIFEROL (VITAMIN D3) 25 MCG
1000 TABLET ORAL DAILY
Status: DISCONTINUED | OUTPATIENT
Start: 2022-11-13 | End: 2022-11-14 | Stop reason: HOSPADM

## 2022-11-12 RX ORDER — SODIUM CHLORIDE 0.9 % (FLUSH) 0.9 %
5 SYRINGE (ML) INJECTION
Status: DISCONTINUED | OUTPATIENT
Start: 2022-11-12 | End: 2022-11-14 | Stop reason: HOSPADM

## 2022-11-12 RX ORDER — HYDROXYZINE HYDROCHLORIDE 25 MG/1
25 TABLET, FILM COATED ORAL 3 TIMES DAILY PRN
Status: DISCONTINUED | OUTPATIENT
Start: 2022-11-12 | End: 2022-11-14 | Stop reason: HOSPADM

## 2022-11-12 RX ORDER — INSULIN ASPART 100 [IU]/ML
0-5 INJECTION, SOLUTION INTRAVENOUS; SUBCUTANEOUS
Status: DISCONTINUED | OUTPATIENT
Start: 2022-11-12 | End: 2022-11-14 | Stop reason: HOSPADM

## 2022-11-12 RX ORDER — NALOXONE HCL 0.4 MG/ML
0.02 VIAL (ML) INJECTION
Status: DISCONTINUED | OUTPATIENT
Start: 2022-11-12 | End: 2022-11-14 | Stop reason: HOSPADM

## 2022-11-12 RX ORDER — POLYETHYLENE GLYCOL 3350 17 G/17G
17 POWDER, FOR SOLUTION ORAL 2 TIMES DAILY PRN
Status: DISCONTINUED | OUTPATIENT
Start: 2022-11-12 | End: 2022-11-14 | Stop reason: HOSPADM

## 2022-11-12 RX ORDER — GLUCAGON 1 MG
1 KIT INJECTION
Status: DISCONTINUED | OUTPATIENT
Start: 2022-11-12 | End: 2022-11-14 | Stop reason: HOSPADM

## 2022-11-12 RX ORDER — SPIRONOLACTONE 25 MG/1
25 TABLET ORAL DAILY
Status: DISCONTINUED | OUTPATIENT
Start: 2022-11-13 | End: 2022-11-14 | Stop reason: HOSPADM

## 2022-11-12 RX ORDER — IBUPROFEN 200 MG
24 TABLET ORAL
Status: DISCONTINUED | OUTPATIENT
Start: 2022-11-12 | End: 2022-11-14 | Stop reason: HOSPADM

## 2022-11-12 RX ORDER — SODIUM CHLORIDE 0.9 % (FLUSH) 0.9 %
10 SYRINGE (ML) INJECTION EVERY 12 HOURS PRN
Status: DISCONTINUED | OUTPATIENT
Start: 2022-11-12 | End: 2022-11-12

## 2022-11-12 RX ORDER — SODIUM CHLORIDE 0.9 % (FLUSH) 0.9 %
10 SYRINGE (ML) INJECTION
Status: DISCONTINUED | OUTPATIENT
Start: 2022-11-12 | End: 2022-11-12

## 2022-11-12 RX ORDER — ACETAMINOPHEN 500 MG
1000 TABLET ORAL EVERY 8 HOURS PRN
Status: DISCONTINUED | OUTPATIENT
Start: 2022-11-12 | End: 2022-11-14 | Stop reason: HOSPADM

## 2022-11-12 RX ORDER — PRAVASTATIN SODIUM 40 MG/1
40 TABLET ORAL DAILY
Status: DISCONTINUED | OUTPATIENT
Start: 2022-11-13 | End: 2022-11-14 | Stop reason: HOSPADM

## 2022-11-12 RX ORDER — IPRATROPIUM BROMIDE AND ALBUTEROL SULFATE 2.5; .5 MG/3ML; MG/3ML
3 SOLUTION RESPIRATORY (INHALATION) EVERY 4 HOURS PRN
Status: DISCONTINUED | OUTPATIENT
Start: 2022-11-12 | End: 2022-11-14 | Stop reason: HOSPADM

## 2022-11-12 RX ORDER — ACETAMINOPHEN 325 MG/1
650 TABLET ORAL EVERY 4 HOURS PRN
Status: DISCONTINUED | OUTPATIENT
Start: 2022-11-12 | End: 2022-11-14 | Stop reason: HOSPADM

## 2022-11-12 RX ORDER — METHOCARBAMOL 500 MG/1
500 TABLET, FILM COATED ORAL 2 TIMES DAILY PRN
Status: DISCONTINUED | OUTPATIENT
Start: 2022-11-12 | End: 2022-11-14 | Stop reason: HOSPADM

## 2022-11-12 RX ORDER — FUROSEMIDE 20 MG/1
20 TABLET ORAL
Status: DISCONTINUED | OUTPATIENT
Start: 2022-11-13 | End: 2022-11-14 | Stop reason: HOSPADM

## 2022-11-12 RX ADMIN — AZELASTINE HYDROCHLORIDE 137 MCG: 137 SPRAY, METERED NASAL at 11:11

## 2022-11-12 RX ADMIN — HEPARIN SODIUM 5000 UNITS: 5000 INJECTION INTRAVENOUS; SUBCUTANEOUS at 10:11

## 2022-11-12 RX ADMIN — ACETAMINOPHEN 1000 MG: 500 TABLET ORAL at 10:11

## 2022-11-12 RX ADMIN — Medication 6 MG: at 10:11

## 2022-11-12 NOTE — TELEPHONE ENCOUNTER
Reason for Disposition   Patient sounds very sick or weak to the triager    Additional Information   Negative: [1] Widespread rash AND [2] bright red, sunburn-like AND [3] too weak to stand   Negative: Sounds like a life-threatening emergency to the triager   Negative: [1] Widespread rash AND [2] bright red, sunburn-like   Negative: SEVERE pain in the wound   Negative: Black (necrotic) or blisters develop in wound    Protocols used: Wound Infection-A-AH    Jenniffer called to say that she is home from the hospital after she was discharged 11/10/2022 following treatment for cellulitis of RLE.  She states she is home now, and the dressing that goes from just below her knee to her toes is soaked with very sticky drainage, and it is draining through the dressing onto the towel she has placed below her elevated leg.  She states the leg continues to drain copiously, and the leg is very tender to touch. Instructions upon discharge indicate orders to keep the dressing dry and intact until she is seen for follow up in clinic.  Home health nurse has not been to see her /re-admit to their service since discharge, she said, and she is very concerned. OT came to see her, she said, but not nurse. Age 89 years, she is unable to attend to this drainage / change dressing herself at this time.  She is home alone.   Per [facility] triage protocol, recommend ED now for evaluation / dressing change.  Recommend she call her daughter, with above information, to request transportation to ED.  She states she will.  Strongly encouraged call for ambulance if she cannot safely get to auto from her home, but she said she can ambulate with walker. She does have follow up appointments with Dr Chavez 11/17, and Dr Garcia 11/21. Message to Dr Garcia, and Dr Chavez, as well as Rubi Young DO, pcp.  Please contact caller directly with any additional care advice.

## 2022-11-13 LAB
ALBUMIN SERPL BCP-MCNC: 3.1 G/DL (ref 3.5–5.2)
ALP SERPL-CCNC: 114 U/L (ref 55–135)
ALT SERPL W/O P-5'-P-CCNC: 18 U/L (ref 10–44)
ANION GAP SERPL CALC-SCNC: 14 MMOL/L (ref 8–16)
AST SERPL-CCNC: 29 U/L (ref 10–40)
BASOPHILS # BLD AUTO: 0.04 K/UL (ref 0–0.2)
BASOPHILS NFR BLD: 1.3 % (ref 0–1.9)
BILIRUB SERPL-MCNC: 0.4 MG/DL (ref 0.1–1)
BUN SERPL-MCNC: 37 MG/DL (ref 8–23)
CALCIUM SERPL-MCNC: 9.4 MG/DL (ref 8.7–10.5)
CHLORIDE SERPL-SCNC: 106 MMOL/L (ref 95–110)
CO2 SERPL-SCNC: 20 MMOL/L (ref 23–29)
CREAT SERPL-MCNC: 1.3 MG/DL (ref 0.5–1.4)
DIFFERENTIAL METHOD: ABNORMAL
EOSINOPHIL # BLD AUTO: 0 K/UL (ref 0–0.5)
EOSINOPHIL NFR BLD: 1.3 % (ref 0–8)
ERYTHROCYTE [DISTWIDTH] IN BLOOD BY AUTOMATED COUNT: 15.8 % (ref 11.5–14.5)
EST. GFR  (NO RACE VARIABLE): 39.1 ML/MIN/1.73 M^2
GLUCOSE SERPL-MCNC: 100 MG/DL (ref 70–110)
HCT VFR BLD AUTO: 30 % (ref 37–48.5)
HGB BLD-MCNC: 9.5 G/DL (ref 12–16)
IMM GRANULOCYTES # BLD AUTO: 0.02 K/UL (ref 0–0.04)
IMM GRANULOCYTES NFR BLD AUTO: 0.6 % (ref 0–0.5)
LYMPHOCYTES # BLD AUTO: 0.7 K/UL (ref 1–4.8)
LYMPHOCYTES NFR BLD: 23.5 % (ref 18–48)
MAGNESIUM SERPL-MCNC: 2 MG/DL (ref 1.6–2.6)
MCH RBC QN AUTO: 29.3 PG (ref 27–31)
MCHC RBC AUTO-ENTMCNC: 31.7 G/DL (ref 32–36)
MCV RBC AUTO: 93 FL (ref 82–98)
MONOCYTES # BLD AUTO: 0.4 K/UL (ref 0.3–1)
MONOCYTES NFR BLD: 11.9 % (ref 4–15)
NEUTROPHILS # BLD AUTO: 1.9 K/UL (ref 1.8–7.7)
NEUTROPHILS NFR BLD: 61.4 % (ref 38–73)
NRBC BLD-RTO: 0 /100 WBC
PHOSPHATE SERPL-MCNC: 4.6 MG/DL (ref 2.7–4.5)
PLATELET # BLD AUTO: 179 K/UL (ref 150–450)
PMV BLD AUTO: 10.6 FL (ref 9.2–12.9)
POCT GLUCOSE: 108 MG/DL (ref 70–110)
POTASSIUM SERPL-SCNC: 4.7 MMOL/L (ref 3.5–5.1)
PROT SERPL-MCNC: 6.2 G/DL (ref 6–8.4)
RBC # BLD AUTO: 3.24 M/UL (ref 4–5.4)
SODIUM SERPL-SCNC: 140 MMOL/L (ref 136–145)
WBC # BLD AUTO: 3.1 K/UL (ref 3.9–12.7)

## 2022-11-13 PROCEDURE — 83735 ASSAY OF MAGNESIUM: CPT | Performed by: NURSE PRACTITIONER

## 2022-11-13 PROCEDURE — 84100 ASSAY OF PHOSPHORUS: CPT | Performed by: NURSE PRACTITIONER

## 2022-11-13 PROCEDURE — G0378 HOSPITAL OBSERVATION PER HR: HCPCS

## 2022-11-13 PROCEDURE — 63600175 PHARM REV CODE 636 W HCPCS: Performed by: NURSE PRACTITIONER

## 2022-11-13 PROCEDURE — 96372 THER/PROPH/DIAG INJ SC/IM: CPT | Performed by: NURSE PRACTITIONER

## 2022-11-13 PROCEDURE — 99226 PR SUBSEQUENT OBSERVATION CARE,LEVEL III: ICD-10-PCS | Mod: ,,,

## 2022-11-13 PROCEDURE — 25000003 PHARM REV CODE 250: Performed by: NURSE PRACTITIONER

## 2022-11-13 PROCEDURE — 36415 COLL VENOUS BLD VENIPUNCTURE: CPT | Performed by: NURSE PRACTITIONER

## 2022-11-13 PROCEDURE — 80053 COMPREHEN METABOLIC PANEL: CPT | Performed by: NURSE PRACTITIONER

## 2022-11-13 PROCEDURE — 99226 PR SUBSEQUENT OBSERVATION CARE,LEVEL III: CPT | Mod: ,,,

## 2022-11-13 PROCEDURE — 85025 COMPLETE CBC W/AUTO DIFF WBC: CPT | Performed by: NURSE PRACTITIONER

## 2022-11-13 RX ADMIN — AZELASTINE HYDROCHLORIDE 137 MCG: 137 SPRAY, METERED NASAL at 08:11

## 2022-11-13 RX ADMIN — ASPIRIN 81 MG: 81 TABLET, COATED ORAL at 09:11

## 2022-11-13 RX ADMIN — HEPARIN SODIUM 5000 UNITS: 5000 INJECTION INTRAVENOUS; SUBCUTANEOUS at 10:11

## 2022-11-13 RX ADMIN — HEPARIN SODIUM 5000 UNITS: 5000 INJECTION INTRAVENOUS; SUBCUTANEOUS at 05:11

## 2022-11-13 RX ADMIN — METHOCARBAMOL 500 MG: 500 TABLET ORAL at 05:11

## 2022-11-13 RX ADMIN — PRAVASTATIN SODIUM 40 MG: 40 TABLET ORAL at 08:11

## 2022-11-13 RX ADMIN — FUROSEMIDE 20 MG: 20 TABLET ORAL at 06:11

## 2022-11-13 RX ADMIN — HEPARIN SODIUM 5000 UNITS: 5000 INJECTION INTRAVENOUS; SUBCUTANEOUS at 02:11

## 2022-11-13 RX ADMIN — SPIRONOLACTONE 25 MG: 25 TABLET, FILM COATED ORAL at 08:11

## 2022-11-13 RX ADMIN — FUROSEMIDE 20 MG: 20 TABLET ORAL at 04:11

## 2022-11-13 RX ADMIN — AZELASTINE HYDROCHLORIDE 137 MCG: 137 SPRAY, METERED NASAL at 09:11

## 2022-11-13 RX ADMIN — ACETAMINOPHEN 1000 MG: 500 TABLET ORAL at 08:11

## 2022-11-13 RX ADMIN — CHOLECALCIFEROL TAB 25 MCG (1000 UNIT) 1000 UNITS: 25 TAB at 08:11

## 2022-11-13 NOTE — ASSESSMENT & PLAN NOTE
Patient's FSGs are controlled on current medication regimen.  Last A1c reviewed-   Lab Results   Component Value Date    HGBA1C 6.0 (H) 09/20/2022     Most recent fingerstick glucose reviewed-   Recent Labs   Lab 11/12/22 2145   POCTGLUCOSE 78     Current correctional scale  Low  Maintain anti-hyperglycemic dose as follows-   Antihyperglycemics (From admission, onward)    Start     Stop Route Frequency Ordered    11/12/22 2051  insulin aspart U-100 pen 0-5 Units         -- SubQ Before meals & nightly PRN 11/12/22 2054        Hold Oral hypoglycemics while patient is in the hospital.  -Accuchecks AC/HS

## 2022-11-13 NOTE — SUBJECTIVE & OBJECTIVE
"Past Medical History:   Diagnosis Date    Amblyopia of left eye 4/10/2013    Anxiety     Arthritis     Facet arthropathy, Lumbosacral    Cataract     Central retinal vein occlusion of left eye     Depression     Diabetic polyneuropathy 2022    Exotropia of both eyes 2013    recession RSR 5.0mm w/ adj; recession LR os 5.0 w/ adj; resect MR os  4.0mm    Hearing loss     History of resection of small bowel     Hypertensive retinopathy of both eyes     Hypoglycemia     Macular degeneration     OA (osteoarthritis) of shoulder     Right    Osteoporosis     Posterior vitreous detachment of both eyes     Rhinitis     TIA (transient ischemic attack)        Past Surgical History:   Procedure Laterality Date    APPENDECTOMY      CARDIAC CATHETERIZATION      CATARACT EXTRACTION W/  INTRAOCULAR LENS IMPLANT Bilateral      SECTION, CLASSIC      CLOSURE OF LEFT ATRIAL APPENDAGE USING DEVICE N/A 2020    Procedure: Left atrial appendage closure device;  Surgeon: Abundio Curtis MD;  Location: [facility] CATH LAB;  Service: Cardiology;  Laterality: N/A;    HYSTERECTOMY      INNER EAR SURGERY      JOINT REPLACEMENT      LEFT KNEE REPLACEMENT IN  -    OOPHORECTOMY      SINUS SURGERY      STRABISMUS SURGERY  13    RSR recession 5 mm, LLR recession 5 mm and LMR resection 4mm    STRABISMUS SURGERY  2014    recess LR OD 6mm    TONSILLECTOMY      watchman surgery N/A 2020       Review of patient's allergies indicates:   Allergen Reactions    Opioids - morphine analogues Other (See Comments)     Bowel issues; bowel obstruction    Tizanidine Other (See Comments)     "Lips were numb,  Almost passed out."    Tramadol Hallucinations    Beta-blockers (beta-adrenergic blocking agts) Other (See Comments)     Can not go on beta blockers for long period of time - due to taking allergy injections    Morphine     Opioids-meperidine and related        No current facility-administered medications on file prior to " encounter.     Current Outpatient Medications on File Prior to Encounter   Medication Sig    acetaminophen (TYLENOL) 500 MG tablet Take 1,000 mg by mouth 2 (two) times a day.    aspirin (ECOTRIN) 81 MG EC tablet Take 1 tablet (81 mg total) by mouth once daily.    azelastine (ASTELIN) 137 mcg (0.1 %) nasal spray 1 spray (137 mcg total) by Nasal route 2 (two) times daily.    fluticasone propionate (FLONASE) 50 mcg/actuation nasal spray USE 1 SPRAY IN EACH NOSTRIL ONE TIME DAILY    furosemide (LASIX) 20 MG tablet Take 1 tablet (20 mg total) by mouth 2 (two) times daily before meals. Take twice a day for Weight greater than 160 lb, once a day for weights less than 160 lb    guaiFENesin 100 mg/5 ml (ROBITUSSIN) 100 mg/5 mL syrup Take 15 mLs by mouth once daily.    methocarbamoL (ROBAXIN) 500 MG Tab Take 1 tablet (500 mg total) by mouth 2 (two) times daily as needed (muscle cramps).    miconazole nitrate 2% (MICOTIN) 2 % Oint Apply topically every other day.    mupirocin (BACTROBAN) 2 % ointment Apply topically 3 (three) times daily. Apply locally every other day    pravastatin (PRAVACHOL) 40 MG tablet Take 1 tablet (40 mg total) by mouth once daily.    spironolactone (ALDACTONE) 25 MG tablet Take twice a day for Weight greater than 160 lb, once a day for weights less than 160 lb    vit C/E/Zn/coppr/lutein/zeaxan (OCUVITE LUTEIN AND ZEAXANTHIN ORAL) Take 1 capsule by mouth once daily. Lutien 25 mg, Zeaxanthin 5 mg    vitamin E 400 UNIT capsule Take 400 Units by mouth once daily.     Family History       Problem Relation (Age of Onset)    Breast cancer Maternal Aunt    Diabetes Sister, Brother    Heart disease Sister, Sister    Hypertension Mother, Father    Liver disease Sister    No Known Problems Maternal Uncle, Paternal Aunt, Paternal Uncle, Maternal Grandmother, Maternal Grandfather, Paternal Grandmother, Paternal Grandfather, Daughter, Son, Son, Son          Tobacco Use    Smoking status: Former     Types: Cigarettes      Quit date: 10/29/1982     Years since quittin.0     Passive exposure: Never    Smokeless tobacco: Never   Substance and Sexual Activity    Alcohol use: Yes     Alcohol/week: 2.0 standard drinks     Types: 2 Shots of liquor per week     Comment: rare    Drug use: No    Sexual activity: Never     Review of Systems   Constitutional:  Negative for appetite change, chills, diaphoresis, fatigue and fever.   HENT:  Negative for congestion, rhinorrhea and sinus pressure.    Eyes:  Negative for photophobia and visual disturbance.   Respiratory:  Negative for cough, shortness of breath and wheezing.    Cardiovascular:  Negative for chest pain and leg swelling.   Gastrointestinal:  Negative for abdominal distention, abdominal pain, diarrhea, nausea and vomiting.   Genitourinary:  Negative for difficulty urinating, dysuria and hematuria.   Musculoskeletal:  Positive for myalgias. Negative for arthralgias, back pain, gait problem and neck pain.   Skin:  Positive for wound (RLE). Negative for color change, pallor and rash.   Neurological:  Negative for dizziness, syncope, weakness, light-headedness and headaches.   Psychiatric/Behavioral:  Negative for agitation, confusion and hallucinations.    Objective:     Vital Signs (Most Recent):  Temp: 98.5 °F (36.9 °C) (22)  Pulse: 73 (22)  Resp: 18 (22)  BP: (!) 117/56 (22)  SpO2: 97 % (22)   Vital Signs (24h Range):  Temp:  [98.5 °F (36.9 °C)] 98.5 °F (36.9 °C)  Pulse:  [73] 73  Resp:  [18] 18  SpO2:  [97 %] 97 %  BP: (117)/(56) 117/56     Weight: 72.6 kg (160 lb)  Body mass index is 26.63 kg/m².    Physical Exam  Vitals and nursing note reviewed.   Constitutional:       General: She is not in acute distress.     Appearance: She is not toxic-appearing or diaphoretic.   HENT:      Head: Normocephalic and atraumatic.      Nose: Nose normal.      Mouth/Throat:      Mouth: Mucous membranes are moist.   Eyes:      Pupils:  Pupils are equal, round, and reactive to light.   Cardiovascular:      Rate and Rhythm: Normal rate and regular rhythm.      Pulses: Normal pulses.   Pulmonary:      Effort: Pulmonary effort is normal. No respiratory distress.      Breath sounds: No wheezing, rhonchi or rales.   Abdominal:      General: Bowel sounds are normal. There is no distension.      Palpations: Abdomen is soft.      Tenderness: There is no abdominal tenderness. There is no guarding.   Musculoskeletal:         General: No swelling, tenderness or deformity. Normal range of motion.      Cervical back: Normal range of motion. No tenderness.   Skin:     General: Skin is warm and dry.      Capillary Refill: Capillary refill takes less than 2 seconds.      Findings: Erythema and wound present.      Comments: Wound noted to RLE over shin, no warmth or fluctuance. Nontender to palpation. Serous drainage noted. Erythema noted. See photos.   Neurological:      General: No focal deficit present.      Mental Status: She is alert and oriented to person, place, and time.      Sensory: No sensory deficit.      Motor: No weakness.   Psychiatric:         Mood and Affect: Mood normal.         Behavior: Behavior normal.         CRANIAL NERVES     CN III, IV, VI   Pupils are equal, round, and reactive to light.           Significant Labs: All pertinent labs within the past 24 hours have been reviewed.  CBC:   Recent Labs   Lab 11/12/22 1941   WBC 4.21   HGB 10.1*   HCT 30.7*        CMP:   Recent Labs   Lab 11/12/22 1941      K 5.1      CO2 23   GLU 83   BUN 37*   CREATININE 1.2   CALCIUM 9.5   PROT 6.9   ALBUMIN 3.4*   BILITOT 0.4   ALKPHOS 125   AST 21   ALT 13   ANIONGAP 14     Lactic Acid:   Recent Labs   Lab 11/12/22 1941   LACTATE 1.0       Significant Imaging: I have reviewed all pertinent imaging results/findings within the past 24 hours.

## 2022-11-13 NOTE — PROGRESS NOTES
[facility] [address] [place] [address] Medicine  Progress Note    Patient Name: Jenniffer Jimenez  MRN: [id number]  Patient Class: OP- Observation   Admission Date: 11/12/2022  Length of Stay: 0 days  Attending Physician: Teofilo Nixon MD  Primary Care Provider: Rubi Young DO        Subjective:     Principal Problem:Non-healing wound of right lower extremity        HPI:  Jenniffer Jimenez is a 89 y.o. female with PMHx of hypoglycemia, CKD, HFpEF, A fib, Meniere's disease, venous insufficiency, HTN, and HLD who presents to the ED due to increased drainage from chronic RLE wound. She was admitted from 11/8/22-11/10/22 for chronic RLE wound and was initially placed on abx, but ID did not feel the wound was infected and recommended discontinuing abx therapy. Unna boot was placed by vascular surgery with follow up in 1 week. She was told the dressing would last a week but she reports the wound is draining through the dressing. She reports soreness to the touch. She says the redness and swelling have improved but the draining has worsened. She has been elevating her leg at home. She is not currently on antibiotics. She denies any fever or chills. Home health previously came every few days but have not come since being discharged, as she was told the dressing would last a week. The patient denies any new numbness, weakness, and affirms improvement of pain in the last week with much improvement in edema of extremity. The patient is concerned due to the drainage continuing to soak through her dressing and feels she cannot manage this at home herself.    In the ED, VSSAF. CBC unremarkable. Cr 1.2 (at baseline). Sed rate 64. CRP 8.8. Lactate WNL. Patient lives alone and does have HH that is supposed to provide wound care but have not been to readmit the patient since discharge. Patient placed in observation for case management consult and possible short term placement for wound care.      Overview/Hospital  Course:  Jenniffer Jimenez was admitted to Observation for RLE wound care and case management consult. Patient reports concern for continued wound drainage and slow healing. Discussed outpatient wound care possibilities with CM. Wound Care to advise plan of care for outpatient wound care.      Interval History: KEM WILLSON. Patient concerned with slow wound healing and is anxious to be discharged home. States her current wound care regimen is not enough to maintain continued healing. Discussed wound care options and plan of care with patient. Wound care to see patient tomorrow, 11/14, and advise follow up needs. Patient denies fevers, chills, nausea, vomiting, chest pain, abdominal pain, constipation, diarrhea. Requesting help to reschedule missed Sports Medicine appointment for lower back pain/left hip bursitis. Case Management to assist with this.    Review of Systems   Constitutional:  Negative for appetite change, chills, diaphoresis, fatigue and fever.   HENT:  Negative for congestion, rhinorrhea and sinus pressure.    Eyes:  Negative for photophobia and visual disturbance.   Respiratory:  Negative for cough, shortness of breath and wheezing.    Cardiovascular:  Negative for chest pain and leg swelling.   Gastrointestinal:  Negative for abdominal distention, abdominal pain, diarrhea, nausea and vomiting.   Genitourinary:  Negative for difficulty urinating, dysuria and hematuria.   Musculoskeletal:  Positive for back pain and myalgias. Negative for arthralgias, gait problem and neck pain.   Skin:  Positive for wound (RLE). Negative for color change, pallor and rash.   Neurological:  Negative for dizziness, syncope, weakness, light-headedness and headaches.   Psychiatric/Behavioral:  Negative for agitation, confusion and hallucinations.      Objective:     Vital Signs (Most Recent):  Temp: 98.5 °F (36.9 °C) (11/13/22 1100)  Pulse: 66 (11/13/22 1100)  Resp: 17 (11/13/22 1100)  BP: 134/68 (11/13/22 1100)  SpO2:  100 % (11/13/22 1100) Vital Signs (24h Range):  Temp:  [97.5 °F (36.4 °C)-98.5 °F (36.9 °C)] 98.5 °F (36.9 °C)  Pulse:  [66-77] 66  Resp:  [17-19] 17  SpO2:  [96 %-100 %] 100 %  BP: (117-134)/(56-68) 134/68     Weight: 72.6 kg (160 lb 0.9 oz)  Body mass index is 26.63 kg/m².  No intake or output data in the 24 hours ending 11/13/22 1458     Physical Exam  Vitals and nursing note reviewed.   Constitutional:       General: She is not in acute distress.     Appearance: She is not toxic-appearing or diaphoretic.   HENT:      Head: Normocephalic and atraumatic.      Nose: Nose normal.      Mouth/Throat:      Mouth: Mucous membranes are moist.   Eyes:      Pupils: Pupils are equal, round, and reactive to light.   Cardiovascular:      Rate and Rhythm: Normal rate and regular rhythm.      Pulses: Normal pulses.   Pulmonary:      Effort: Pulmonary effort is normal. No respiratory distress.      Breath sounds: No wheezing, rhonchi or rales.   Abdominal:      General: Bowel sounds are normal. There is no distension.      Palpations: Abdomen is soft.      Tenderness: There is no abdominal tenderness. There is no guarding.   Musculoskeletal:         General: No swelling, tenderness or deformity. Normal range of motion.      Cervical back: Normal range of motion. No tenderness.   Skin:     General: Skin is warm and dry.      Capillary Refill: Capillary refill takes less than 2 seconds.      Findings: Erythema and wound present.      Comments: Dressing to RLE, C/D/I   Neurological:      General: No focal deficit present.      Mental Status: She is alert and oriented to person, place, and time.      Sensory: No sensory deficit.      Motor: No weakness.   Psychiatric:         Mood and Affect: Mood normal.         Behavior: Behavior normal.       Significant Labs: All pertinent labs within the past 24 hours have been reviewed.    Significant Imaging: I have reviewed all pertinent imaging results/findings within the past 24  hours.      Assessment/Plan:      * Non-healing wound of right lower extremity  Follows with wound care and ID outpatient. Patient with concerns of being discharged home. Feels she does not receive appropriate wound care outpatient and would prefer to be admitted or go to SNF.  - SIRS 0/4  - Lactate 1.0  - PRN tylenol for pain  - Wound Care consulted  - Follow up with Vascular Surgery next week    Unable to care for self  - Patient lives alone and is unable to change her RLE dressing independently  - Case management consulted for possible placement vs close wound care follow-up with provided transportation    Diabetic polyneuropathy  Patient's FSGs are controlled on current medication regimen.  Last A1c reviewed-   Lab Results   Component Value Date    HGBA1C 6.0 (H) 09/20/2022     Most recent fingerstick glucose reviewed-   Recent Labs   Lab 11/12/22 2145   POCTGLUCOSE 78     Current correctional scale  Low  Maintain anti-hyperglycemic dose as follows-   Antihyperglycemics (From admission, onward)    Start     Stop Route Frequency Ordered    11/12/22 2051  insulin aspart U-100 pen 0-5 Units         -- SubQ Before meals & nightly PRN 11/12/22 2054        Hold Oral hypoglycemics while patient is in the hospital.  -Accuchecks AC/HS    Normocytic anemia  Patient's anemia is currently controlled. S/p 0 units of PRBCs.   Current CBC reviewed-   Lab Results   Component Value Date    HGB 9.5 (L) 11/13/2022    HCT 30.0 (L) 11/13/2022     Monitor serial CBC and transfuse if patient becomes hemodynamically unstable, symptomatic or H/H drops below 7/21.     Venous insufficiency of both lower extremities  - Continue ASA and statin  - Keep lower extremities elevated  - Has outpatient vascular follow up next week    History of TIA (transient ischemic attack)  - Continue ASA and statin    Chronic diastolic heart failure  Patient is identified as having Diastolic (HFpEF) heart failure that is Chronic. CHF is currently controlled.  Latest ECHO performed and demonstrates- Results for orders placed during the hospital encounter of 10/07/22    Echo    Interpretation Summary  · The left ventricle is normal in size with concentric remodeling and normal systolic function.  · The estimated ejection fraction is 55-60%.  · Grade III left ventricular diastolic dysfunction.  · Mild mitral regurgitation.  · Normal right ventricular size with mildly reduced right ventricular systolic function.  · Severe tricuspid regurgitation.  · The estimated PA systolic pressure is 52 mmHg. PASP may be under-estimated due to TR severity.  · Elevated central venous pressure (15 mmHg).  · There is pulmonary hypertension.  · Severe left atrial enlargement.  - Continue Furosemide, Aldactone, and monitor clinical status closely  - Monitor on telemetry  - Patient is off CHF pathway.   - Monitor strict Is&Os and daily weights.  - Place on fluid restriction of 1.5 L.   - Continue to stress to patient importance of self efficacy and  on diet for CHF.   - Appears euvolemic on exam.    Stage 3b chronic kidney disease  - Creatine stable for now  - BMP reviewed- noted Estimated Creatinine Clearance: 28.7 mL/min (based on SCr of 1.3 mg/dL). according to latest data.   - Monitor UOP and serial BMP and adjust therapy as needed.   - Renally dose meds.    Chronic atrial fibrillation  Presence of Watchman left atrial appendage closure device  Patient with Permanent atrial fibrillation which is controlled currently. Patient is currently in atrial fibrillation. [id number] Score: 4. Patient is s/p Watchman.    Meniere's disease  - Chronic, stable  - Functional status at baseline    Primary hypertension  - controlled   - continue home Lasix and aldactone  - Low Na diet    Pure hypercholesterolemia   Patient is chronically on statin.will continue for now. Monitor clinically. Last LDL was   Lab Results   Component Value Date    LDLCALC 105.8 09/20/2022         VTE Risk Mitigation (From  admission, onward)         Ordered     heparin (porcine) injection 5,000 Units  Every 8 hours         11/12/22 2054     IP VTE HIGH RISK PATIENT  Once         11/12/22 2055     Place sequential compression device  Until discontinued         11/12/22 2055                Discharge Planning   GILES: 11/15/2022     Code Status: Full Code   Is the patient medically ready for discharge?: No    Reason for patient still in hospital (select all that apply): Treatment and Consult recommendations                     Kelton Castillo PA-C  Department of Hospital Medicine   [facility]

## 2022-11-13 NOTE — ASSESSMENT & PLAN NOTE
Presence of Watchman left atrial appendage closure device  Patient with Permanent atrial fibrillation which is controlled currently. Patient is currently in atrial fibrillation. [id number] Score: 4. Patient is s/p Watchman.

## 2022-11-13 NOTE — PLAN OF CARE
Problem: Adult Inpatient Plan of Care  Goal: Plan of Care Review  Outcome: Ongoing, Progressing     Problem: Diabetes Comorbidity  Goal: Blood Glucose Level Within Targeted Range  Outcome: Ongoing, Progressing     Problem: Impaired Wound Healing  Goal: Optimal Wound Healing  Outcome: Ongoing, Progressing

## 2022-11-13 NOTE — ED TRIAGE NOTES
"Jenniffer Jimenez, a 89 y.o. female presents to the ED w/ complaint of Wound    Triage note: Patient states wound to right leg for months patient states multiple trips to ER, patient states she came in today as the wound was weeping.   Chief Complaint   Patient presents with    Wound Check     Recent infection to left leg, wound leaking liquid      Review of patient's allergies indicates:   Allergen Reactions    Opioids - morphine analogues Other (See Comments)     Bowel issues; bowel obstruction    Tizanidine Other (See Comments)     "Lips were numb,  Almost passed out."    Tramadol Hallucinations    Beta-blockers (beta-adrenergic blocking agts) Other (See Comments)     Can not go on beta blockers for long period of time - due to taking allergy injections    Morphine     Opioids-meperidine and related      Past Medical History:   Diagnosis Date    Amblyopia of left eye 4/10/2013    Anxiety     Arthritis     Facet arthropathy, Lumbosacral    Cataract     Central retinal vein occlusion of left eye     Depression     Diabetic polyneuropathy 1/6/2022    Exotropia of both eyes 2/6/2013    recession RSR 5.0mm w/ adj; recession LR os 5.0 w/ adj; resect MR os  4.0mm    Hearing loss     History of resection of small bowel     Hypertensive retinopathy of both eyes     Hypoglycemia     Macular degeneration     OA (osteoarthritis) of shoulder     Right    Osteoporosis     Posterior vitreous detachment of both eyes     Rhinitis     TIA (transient ischemic attack)       "

## 2022-11-13 NOTE — HPI
Jenniffer Jimenez is a 89 y.o. female with PMHx of hypoglycemia, CKD, HFpEF, A fib, Meniere's disease, venous insufficiency, HTN, and HLD who presents to the ED due to increased drainage from chronic RLE wound. She was admitted from 11/8/22-11/10/22 for chronic RLE wound and was initially placed on abx, but ID did not feel the wound was infected and recommended discontinuing abx therapy. Unna boot was placed by vascular surgery with follow up in 1 week. She was told the dressing would last a week but she reports the wound is draining through the dressing. She reports soreness to the touch. She says the redness and swelling have improved but the draining has worsened. She has been elevating her leg at home. She is not currently on antibiotics. She denies any fever or chills. Home health previously came every few days but have not come since being discharged, as she was told the dressing would last a week. The patient denies any new numbness, weakness, and affirms improvement of pain in the last week with much improvement in edema of extremity. The patient is concerned due to the drainage continuing to soak through her dressing and feels she cannot manage this at home herself.    In the ED, VSSAF. CBC unremarkable. Cr 1.2 (at baseline). Sed rate 64. CRP 8.8. Lactate WNL. Patient lives alone and does have HH that is supposed to provide wound care but have not been to readmit the patient since discharge. Patient placed in observation for case management consult and possible short term placement for wound care.

## 2022-11-13 NOTE — ASSESSMENT & PLAN NOTE
Patient lives alone and is unable to change her RLE dressing herself.  -Case management consulted for possible placement.

## 2022-11-13 NOTE — ASSESSMENT & PLAN NOTE
Presence of Watchman left atrial appendage closure device  Patient with Permanent atrial fibrillation which is controlled currently. Patient is currently in atrial fibrillation.[id number] Score: 4. Patient is s/p Watchman.

## 2022-11-13 NOTE — ASSESSMENT & PLAN NOTE
Creatine stable for now. BMP reviewed- noted Estimated Creatinine Clearance: 31.1 mL/min (based on SCr of 1.2 mg/dL). according to latest data. Monitor UOP and serial BMP and adjust therapy as needed. Renally dose meds.

## 2022-11-13 NOTE — ASSESSMENT & PLAN NOTE
Patient is chronically on statin.will continue for now. Monitor clinically. Last LDL was   Lab Results   Component Value Date    LDLCALC 105.8 09/20/2022

## 2022-11-13 NOTE — ASSESSMENT & PLAN NOTE
Patient's FSGs are controlled on current medication regimen.  Last A1c reviewed-   Lab Results   Component Value Date    HGBA1C 6.0 (H) 09/20/2022     Most recent fingerstick glucose reviewed- No results for input(s): POCTGLUCOSE in the last 24 hours.  Current correctional scale  Low  Maintain anti-hyperglycemic dose as follows-   Antihyperglycemics (From admission, onward)    Start     Stop Route Frequency Ordered    11/12/22 2051  insulin aspart U-100 pen 0-5 Units         -- SubQ Before meals & nightly PRN 11/12/22 2054        Hold Oral hypoglycemics while patient is in the hospital.  -Accuchecks AC/HS

## 2022-11-13 NOTE — ASSESSMENT & PLAN NOTE
Follows with wound care and ID outpatient. Patient with concerns of being discharged home. Feels she does not receive appropriate wound care outpatient and would prefer to be admitted or go to SNF.  - SIRS 0/4  - Lactate 1.0  - PRN tylenol for pain  - Wound Care consulted  - Follow up with Vascular Surgery next week

## 2022-11-13 NOTE — ASSESSMENT & PLAN NOTE
Patient is identified as having Diastolic (HFpEF) heart failure that is Chronic. CHF is currently controlled. Latest ECHO performed and demonstrates- Results for orders placed during the hospital encounter of 10/07/22    Echo    Interpretation Summary  · The left ventricle is normal in size with concentric remodeling and normal systolic function.  · The estimated ejection fraction is 55-60%.  · Grade III left ventricular diastolic dysfunction.  · Mild mitral regurgitation.  · Normal right ventricular size with mildly reduced right ventricular systolic function.  · Severe tricuspid regurgitation.  · The estimated PA systolic pressure is 52 mmHg. PASP may be under-estimated due to TR severity.  · Elevated central venous pressure (15 mmHg).  · There is pulmonary hypertension.  · Severe left atrial enlargement.  - Continue Furosemide, Aldactone, and monitor clinical status closely  - Monitor on telemetry  - Patient is off CHF pathway.   - Monitor strict Is&Os and daily weights.  - Place on fluid restriction of 1.5 L.   - Continue to stress to patient importance of self efficacy and  on diet for CHF.   - Appears euvolemic on exam.

## 2022-11-13 NOTE — ASSESSMENT & PLAN NOTE
- Patient lives alone and is unable to change her RLE dressing independently  - Case management consulted for possible placement vs close wound care follow-up with provided transportation

## 2022-11-13 NOTE — ED PROVIDER NOTES
"Encounter Date: 11/12/2022    SCRIBE #1 NOTE: I, Ilana Montenegro, am scribing for, and in the presence of,  Contreras Christian MD. I have scribed the entire note.   STAFF ATTENDING PHYSICIAN NOTE:  I provided and agree with the documentation provided by SCRIBE on Jenniffer Jimenez.  ____________________  Mega JOELLEN Christian MD, [facility]  Emergency Medicine Staff  7:43 PM 11/12/2022    History     Chief Complaint   Patient presents with    Wound Check     Recent infection to left leg, wound leaking liquid      Time patient was seen by the provider: 7:08 PM      The patient is a 89 y.o. female with co-morbidities including: hypoglycemia, hearing loss, TIA, diabetic polyneuropathy who presents to the ED with a complaint of a wound to her right shin with onset a few weeks ago. She was admitted from 11/8/22-11/10/22. Her shin is wrapped on arrival. She was told the dressing would last a week but she reports the wound is draining through the dressing. She reports soreness to the touch. She says the redness and swelling have improved but the draining and pain have worsened. She has been elevating her leg at home. She is not currently on antibiotics. She denies any fever. Home health came every few days but has not come since being admitted as she was told the dressing would last a week.     The history is provided by the patient and medical records. No  was used.   No numbness, no weakness, no fever or chills and affirms improvement of pain in the last week with much improvement in edema of extremity.  She acknowledges that the contour/tapering of her lower extremity on to ankle joint has improved significantly.      Review of patient's allergies indicates:   Allergen Reactions    Opioids - morphine analogues Other (See Comments)     Bowel issues; bowel obstruction    Tizanidine Other (See Comments)     "Lips were numb,  Almost passed out."    Tramadol Hallucinations    Beta-blockers (beta-adrenergic blocking " agts) Other (See Comments)     Can not go on beta blockers for long period of time - due to taking allergy injections    Morphine     Opioids-meperidine and related      Past Medical History:   Diagnosis Date    Amblyopia of left eye 4/10/2013    Anxiety     Arthritis     Facet arthropathy, Lumbosacral    Cataract     Central retinal vein occlusion of left eye     Depression     Diabetic polyneuropathy 2022    Exotropia of both eyes 2013    recession RSR 5.0mm w/ adj; recession LR os 5.0 w/ adj; resect MR os  4.0mm    Hearing loss     History of resection of small bowel     Hypertensive retinopathy of both eyes     Hypoglycemia     Macular degeneration     OA (osteoarthritis) of shoulder     Right    Osteoporosis     Posterior vitreous detachment of both eyes     Rhinitis     TIA (transient ischemic attack)      Past Surgical History:   Procedure Laterality Date    APPENDECTOMY      CARDIAC CATHETERIZATION      CATARACT EXTRACTION W/  INTRAOCULAR LENS IMPLANT Bilateral      SECTION, CLASSIC      CLOSURE OF LEFT ATRIAL APPENDAGE USING DEVICE N/A 2020    Procedure: Left atrial appendage closure device;  Surgeon: Abundio Cutris MD;  Location: [facility] CATH LAB;  Service: Cardiology;  Laterality: N/A;    HYSTERECTOMY      INNER EAR SURGERY      JOINT REPLACEMENT      LEFT KNEE REPLACEMENT IN  -    OOPHORECTOMY      SINUS SURGERY      STRABISMUS SURGERY  13    RSR recession 5 mm, LLR recession 5 mm and LMR resection 4mm    STRABISMUS SURGERY  2014    recess LR OD 6mm    TONSILLECTOMY      watchman surgery N/A 2020     Family History   Problem Relation Age of Onset    Hypertension Mother     Hypertension Father     Liver disease Sister     Diabetes Sister     Heart disease Sister     Diabetes Brother     Breast cancer Maternal Aunt     No Known Problems Maternal Uncle     No Known Problems Paternal Aunt     No Known Problems Paternal Uncle     No Known Problems Maternal Grandmother      No Known Problems Maternal Grandfather     No Known Problems Paternal Grandmother     No Known Problems Paternal Grandfather     No Known Problems Daughter     No Known Problems Son     Heart disease Sister     No Known Problems Son     No Known Problems Son     Cancer Neg Hx         in first degree relatives    Melanoma Neg Hx     Psoriasis Neg Hx     Lupus Neg Hx     Amblyopia Neg Hx     Blindness Neg Hx     Cataracts Neg Hx     Glaucoma Neg Hx     Macular degeneration Neg Hx     Retinal detachment Neg Hx     Strabismus Neg Hx     Stroke Neg Hx     Thyroid disease Neg Hx      Social History     Tobacco Use    Smoking status: Former     Types: Cigarettes     Quit date: 10/29/1982     Years since quittin.0     Passive exposure: Never    Smokeless tobacco: Never   Substance Use Topics    Alcohol use: Yes     Alcohol/week: 2.0 standard drinks     Types: 2 Shots of liquor per week     Comment: rare    Drug use: No     Review of Systems   Constitutional:  Negative for fever.   HENT:  Negative for sore throat.    Respiratory:  Negative for shortness of breath.    Cardiovascular:  Negative for chest pain.   Gastrointestinal:  Negative for nausea.   Genitourinary:  Negative for dysuria.   Musculoskeletal:  Negative for back pain.   Skin:  Positive for wound (right shin; draining, painful). Negative for rash.   Neurological:  Negative for weakness.   Hematological:  Does not bruise/bleed easily.     Physical Exam     Initial Vitals [22 1839]   BP Pulse Resp Temp SpO2   (!) 117/56 73 18 98.5 °F (36.9 °C) 97 %      MAP       --         Physical Exam  GENERAL: Calm; Cooperative; Well-appearing and Non-Toxic; Well-Nourished; NAD.  Walks at baseline with walker.  HEENT: AT/NC; anicteric speaking full sentences with no slurring of speech or drooling/inability to tolerate oral secretions.  NECK: Supple, FROM with no meningismus, no accessory muscle use.   THORAX/BACK: Atraumatic with NTTP. No midline TTP to C/T/LS  spine  HEART: Regular rate and rhythm, no M/G/T.  LUNGS: No Tachypnea, No Retractions, and CTA B/L with no W/R/R.  ABDOMEN: Soft, ND, NTTP.   EXTREMITIES: FROM. Strength 5/5. Symmetrical Sensorium and with no deficits. Soft Comparments.  SKIN: Wound is not hot/warm to palpation, nontender to palpation. No crepitus. Not malodorous. Compared to previous photo shown by the patient, wound is less erythematous.   VASCULAR: 2+ pulses Prox/Dist & Symmetrical with No delay.  NEUROLOGIC: AAOx3; Answering questions appropriately with no focal deficits          ED Course   Procedures  Labs Reviewed   CBC W/ AUTO DIFFERENTIAL - Abnormal; Notable for the following components:       Result Value    RBC 3.44 (*)     Hemoglobin 10.1 (*)     Hematocrit 30.7 (*)     RDW 15.5 (*)     Immature Granulocytes 0.7 (*)     All other components within normal limits   COMPREHENSIVE METABOLIC PANEL - Abnormal; Notable for the following components:    BUN 37 (*)     Albumin 3.4 (*)     eGFR 43.0 (*)     All other components within normal limits   SEDIMENTATION RATE - Abnormal; Notable for the following components:    Sed Rate 64 (*)     All other components within normal limits   C-REACTIVE PROTEIN - Abnormal; Notable for the following components:    CRP 8.8 (*)     All other components within normal limits   LACTIC ACID, PLASMA   URINALYSIS, REFLEX TO URINE CULTURE          Imaging Results    None          Medications - No data to display  Medical Decision Making:   History:   Old Medical Records: I decided to obtain old medical records.  Initial Assessment:   Patient is afebrile and well-appearing with wound appropriately healing.  Wound itself is not malodorous, with no appreciated asymmetric warmth, no tenderness to palpation, crepitus or really any red flags of infection at this time.  Patient is concerned about caring for self. Was discharged 2 days ago and home health aid did not come yesterday or today. She would prefer to stay in a  short term facility.   ____________________  Mega Christian MD, [facility]  Emergency Medicine Staff  7:45 PM 11/12/2022    Clinical Tests:   Lab Tests: Ordered and Reviewed        Scribe Attestation:   Scribe #1: I performed the above scribed service and the documentation accurately describes the services I performed. I attest to the accuracy of the note.                   Clinical Impression:   Final diagnoses:  [Z78.9] Unable to care for self (Primary)  [Z51.89] Visit for wound check      ED Disposition Condition    Observation Stable                Contreras Christian MD  11/12/22 2038

## 2022-11-13 NOTE — ASSESSMENT & PLAN NOTE
Patient's anemia is currently controlled. S/p 0 units of PRBCs.   Current CBC reviewed-   Lab Results   Component Value Date    HGB 10.1 (L) 11/12/2022    HCT 30.7 (L) 11/12/2022     Monitor serial CBC and transfuse if patient becomes hemodynamically unstable, symptomatic or H/H drops below 7/21.

## 2022-11-13 NOTE — ASSESSMENT & PLAN NOTE
Patient's anemia is currently controlled. S/p 0 units of PRBCs.   Current CBC reviewed-   Lab Results   Component Value Date    HGB 9.5 (L) 11/13/2022    HCT 30.0 (L) 11/13/2022     Monitor serial CBC and transfuse if patient becomes hemodynamically unstable, symptomatic or H/H drops below 7/21.

## 2022-11-13 NOTE — H&P
[facility] [address] [place] [address] Medicine  History & Physical    Patient Name: Jenniffer Jimenez  MRN: [id number]  Patient Class: OP- Observation  Admission Date: 11/12/2022  Attending Physician: Teofilo Nixon MD   Primary Care Provider: Rubi Young DO         Patient information was obtained from patient, past medical records and ER records.     Subjective:     Principal Problem:Non-healing wound of right lower extremity    Chief Complaint:   Chief Complaint   Patient presents with    Wound Check     Recent infection to left leg, wound leaking liquid         HPI: Jenniffer Jimenez is a 89 y.o. female with PMHx of hypoglycemia, CKD, HFpEF, A fib, Meniere's disease, venous insufficiency, HTN, and HLD who presents to the ED due to increased drainage from chronic RLE wound. She was admitted from 11/8/22-11/10/22 for chronic RLE wound and was initially placed on abx, but ID did not feel the wound was infected and recommended discontinuing abx therapy. Unna boot was placed by vascular surgery with follow up in 1 week. She was told the dressing would last a week but she reports the wound is draining through the dressing. She reports soreness to the touch. She says the redness and swelling have improved but the draining has worsened. She has been elevating her leg at home. She is not currently on antibiotics. She denies any fever or chills. Home health previously came every few days but have not come since being discharged, as she was told the dressing would last a week. The patient denies any new numbness, weakness, and affirms improvement of pain in the last week with much improvement in edema of extremity. The patient is concerned due to the drainage continuing to soak through her dressing and feels she cannot manage this at home herself.    In the ED, VSSAF. CBC unremarkable. Cr 1.2 (at baseline). Sed rate 64. CRP 8.8. Lactate WNL. Patient lives alone and does have HH that is supposed to provide wound  "care but have not been to readmit the patient since discharge. Patient placed in observation for case management consult and possible short term placement for wound care.      Past Medical History:   Diagnosis Date    Amblyopia of left eye 4/10/2013    Anxiety     Arthritis     Facet arthropathy, Lumbosacral    Cataract     Central retinal vein occlusion of left eye     Depression     Diabetic polyneuropathy 2022    Exotropia of both eyes 2013    recession RSR 5.0mm w/ adj; recession LR os 5.0 w/ adj; resect MR os  4.0mm    Hearing loss     History of resection of small bowel     Hypertensive retinopathy of both eyes     Hypoglycemia     Macular degeneration     OA (osteoarthritis) of shoulder     Right    Osteoporosis     Posterior vitreous detachment of both eyes     Rhinitis     TIA (transient ischemic attack)        Past Surgical History:   Procedure Laterality Date    APPENDECTOMY      CARDIAC CATHETERIZATION      CATARACT EXTRACTION W/  INTRAOCULAR LENS IMPLANT Bilateral      SECTION, CLASSIC      CLOSURE OF LEFT ATRIAL APPENDAGE USING DEVICE N/A 2020    Procedure: Left atrial appendage closure device;  Surgeon: Abundio Curtis MD;  Location: [facility] CATH LAB;  Service: Cardiology;  Laterality: N/A;    HYSTERECTOMY      INNER EAR SURGERY      JOINT REPLACEMENT      LEFT KNEE REPLACEMENT IN  -    OOPHORECTOMY      SINUS SURGERY      STRABISMUS SURGERY  13    RSR recession 5 mm, LLR recession 5 mm and LMR resection 4mm    STRABISMUS SURGERY  2014    recess LR OD 6mm    TONSILLECTOMY      watchman surgery N/A 2020       Review of patient's allergies indicates:   Allergen Reactions    Opioids - morphine analogues Other (See Comments)     Bowel issues; bowel obstruction    Tizanidine Other (See Comments)     "Lips were numb,  Almost passed out."    Tramadol Hallucinations    Beta-blockers (beta-adrenergic blocking agts) Other (See Comments)     Can not go on beta blockers for " long period of time - due to taking allergy injections    Morphine     Opioids-meperidine and related        No current facility-administered medications on file prior to encounter.     Current Outpatient Medications on File Prior to Encounter   Medication Sig    acetaminophen (TYLENOL) 500 MG tablet Take 1,000 mg by mouth 2 (two) times a day.    aspirin (ECOTRIN) 81 MG EC tablet Take 1 tablet (81 mg total) by mouth once daily.    azelastine (ASTELIN) 137 mcg (0.1 %) nasal spray 1 spray (137 mcg total) by Nasal route 2 (two) times daily.    fluticasone propionate (FLONASE) 50 mcg/actuation nasal spray USE 1 SPRAY IN EACH NOSTRIL ONE TIME DAILY    furosemide (LASIX) 20 MG tablet Take 1 tablet (20 mg total) by mouth 2 (two) times daily before meals. Take twice a day for Weight greater than 160 lb, once a day for weights less than 160 lb    guaiFENesin 100 mg/5 ml (ROBITUSSIN) 100 mg/5 mL syrup Take 15 mLs by mouth once daily.    methocarbamoL (ROBAXIN) 500 MG Tab Take 1 tablet (500 mg total) by mouth 2 (two) times daily as needed (muscle cramps).    miconazole nitrate 2% (MICOTIN) 2 % Oint Apply topically every other day.    mupirocin (BACTROBAN) 2 % ointment Apply topically 3 (three) times daily. Apply locally every other day    pravastatin (PRAVACHOL) 40 MG tablet Take 1 tablet (40 mg total) by mouth once daily.    spironolactone (ALDACTONE) 25 MG tablet Take twice a day for Weight greater than 160 lb, once a day for weights less than 160 lb    vit C/E/Zn/coppr/lutein/zeaxan (OCUVITE LUTEIN AND ZEAXANTHIN ORAL) Take 1 capsule by mouth once daily. Lutien 25 mg, Zeaxanthin 5 mg    vitamin E 400 UNIT capsule Take 400 Units by mouth once daily.     Family History       Problem Relation (Age of Onset)    Breast cancer Maternal Aunt    Diabetes Sister, Brother    Heart disease Sister, Sister    Hypertension Mother, Father    Liver disease Sister    No Known Problems Maternal Uncle, Paternal Aunt, Paternal Uncle, Maternal  Grandmother, Maternal Grandfather, Paternal Grandmother, Paternal Grandfather, Daughter, Son, Son, Son          Tobacco Use    Smoking status: Former     Types: Cigarettes     Quit date: 10/29/1982     Years since quittin.0     Passive exposure: Never    Smokeless tobacco: Never   Substance and Sexual Activity    Alcohol use: Yes     Alcohol/week: 2.0 standard drinks     Types: 2 Shots of liquor per week     Comment: rare    Drug use: No    Sexual activity: Never     Review of Systems   Constitutional:  Negative for appetite change, chills, diaphoresis, fatigue and fever.   HENT:  Negative for congestion, rhinorrhea and sinus pressure.    Eyes:  Negative for photophobia and visual disturbance.   Respiratory:  Negative for cough, shortness of breath and wheezing.    Cardiovascular:  Negative for chest pain and leg swelling.   Gastrointestinal:  Negative for abdominal distention, abdominal pain, diarrhea, nausea and vomiting.   Genitourinary:  Negative for difficulty urinating, dysuria and hematuria.   Musculoskeletal:  Positive for myalgias. Negative for arthralgias, back pain, gait problem and neck pain.   Skin:  Positive for wound (RLE). Negative for color change, pallor and rash.   Neurological:  Negative for dizziness, syncope, weakness, light-headedness and headaches.   Psychiatric/Behavioral:  Negative for agitation, confusion and hallucinations.    Objective:     Vital Signs (Most Recent):  Temp: 98.5 °F (36.9 °C) (22)  Pulse: 73 (22)  Resp: 18 (22)  BP: (!) 117/56 (22)  SpO2: 97 % (22)   Vital Signs (24h Range):  Temp:  [98.5 °F (36.9 °C)] 98.5 °F (36.9 °C)  Pulse:  [73] 73  Resp:  [18] 18  SpO2:  [97 %] 97 %  BP: (117)/(56) 117/56     Weight: 72.6 kg (160 lb)  Body mass index is 26.63 kg/m².    Physical Exam  Vitals and nursing note reviewed.   Constitutional:       General: She is not in acute distress.     Appearance: She is not toxic-appearing or  diaphoretic.   HENT:      Head: Normocephalic and atraumatic.      Nose: Nose normal.      Mouth/Throat:      Mouth: Mucous membranes are moist.   Eyes:      Pupils: Pupils are equal, round, and reactive to light.   Cardiovascular:      Rate and Rhythm: Normal rate and regular rhythm.      Pulses: Normal pulses.   Pulmonary:      Effort: Pulmonary effort is normal. No respiratory distress.      Breath sounds: No wheezing, rhonchi or rales.   Abdominal:      General: Bowel sounds are normal. There is no distension.      Palpations: Abdomen is soft.      Tenderness: There is no abdominal tenderness. There is no guarding.   Musculoskeletal:         General: No swelling, tenderness or deformity. Normal range of motion.      Cervical back: Normal range of motion. No tenderness.   Skin:     General: Skin is warm and dry.      Capillary Refill: Capillary refill takes less than 2 seconds.      Findings: Erythema and wound present.      Comments: Wound noted to RLE over shin, no warmth or fluctuance. Nontender to palpation. Serous drainage noted. Erythema noted. See photos.   Neurological:      General: No focal deficit present.      Mental Status: She is alert and oriented to person, place, and time.      Sensory: No sensory deficit.      Motor: No weakness.   Psychiatric:         Mood and Affect: Mood normal.         Behavior: Behavior normal.         CRANIAL NERVES     CN III, IV, VI   Pupils are equal, round, and reactive to light.           Significant Labs: All pertinent labs within the past 24 hours have been reviewed.  CBC:   Recent Labs   Lab 11/12/22 1941   WBC 4.21   HGB 10.1*   HCT 30.7*        CMP:   Recent Labs   Lab 11/12/22 1941      K 5.1      CO2 23   GLU 83   BUN 37*   CREATININE 1.2   CALCIUM 9.5   PROT 6.9   ALBUMIN 3.4*   BILITOT 0.4   ALKPHOS 125   AST 21   ALT 13   ANIONGAP 14     Lactic Acid:   Recent Labs   Lab 11/12/22 1941   LACTATE 1.0       Significant Imaging: I have  reviewed all pertinent imaging results/findings within the past 24 hours.    Assessment/Plan:     * Non-healing wound of right lower extremity  Follows with wound care and ID outpatient.  - SIRS 0/4  - Lactate 1.0  - PRN tylenol for pain  - wound care consulted  - Follow up with Vascular Surgery next week.    Unable to care for self  Patient lives alone and is unable to change her RLE dressing herself.  -Case management consulted for possible placement.    Diabetic polyneuropathy  Patient's FSGs are controlled on current medication regimen.  Last A1c reviewed-   Lab Results   Component Value Date    HGBA1C 6.0 (H) 09/20/2022     Most recent fingerstick glucose reviewed- No results for input(s): POCTGLUCOSE in the last 24 hours.  Current correctional scale  Low  Maintain anti-hyperglycemic dose as follows-   Antihyperglycemics (From admission, onward)      Start     Stop Route Frequency Ordered    11/12/22 2051  insulin aspart U-100 pen 0-5 Units         -- SubQ Before meals & nightly PRN 11/12/22 2054          Hold Oral hypoglycemics while patient is in the hospital.  -Accuchecks AC/HS    Normocytic anemia  Patient's anemia is currently controlled. S/p 0 units of PRBCs.   Current CBC reviewed-   Lab Results   Component Value Date    HGB 10.1 (L) 11/12/2022    HCT 30.7 (L) 11/12/2022     Monitor serial CBC and transfuse if patient becomes hemodynamically unstable, symptomatic or H/H drops below 7/21.     Venous insufficiency of both lower extremities  -Continue ASA and statin.  -Keep lower extremities elevated.  -Has outpatient vascular follow up next week    History of TIA (transient ischemic attack)  -Continue ASA and statin    Chronic diastolic heart failure  Patient is identified as having Diastolic (HFpEF) heart failure that is Chronic. CHF is currently controlled. Latest ECHO performed and demonstrates- Results for orders placed during the hospital encounter of 10/07/22    Echo    Interpretation Summary  · The  left ventricle is normal in size with concentric remodeling and normal systolic function.  · The estimated ejection fraction is 55-60%.  · Grade III left ventricular diastolic dysfunction.  · Mild mitral regurgitation.  · Normal right ventricular size with mildly reduced right ventricular systolic function.  · Severe tricuspid regurgitation.  · The estimated PA systolic pressure is 52 mmHg. PASP may be under-estimated due to TR severity.  · Elevated central venous pressure (15 mmHg).  · There is pulmonary hypertension.  · Severe left atrial enlargement.  . Continue Furosemide Aldactone and monitor clinical status closely. Monitor on telemetry. Patient is off CHF pathway.  Monitor strict Is&Os and daily weights.  Place on fluid restriction of 1.5 L. Continue to stress to patient importance of self efficacy and  on diet for CHF.   -Appears euvolemic on exam.    Stage 3b chronic kidney disease  Creatine stable for now. BMP reviewed- noted Estimated Creatinine Clearance: 31.1 mL/min (based on SCr of 1.2 mg/dL). according to latest data. Monitor UOP and serial BMP and adjust therapy as needed. Renally dose meds.    Chronic atrial fibrillation  Presence of Watchman left atrial appendage closure device  Patient with Permanent atrial fibrillation which is controlled currently. Patient is currently in atrial fibrillation.[id number] Score: 4. Patient is s/p Watchman.    Meniere's disease  - no active issues  - functional status at baseline    Primary hypertension  - controlled   - continue home Lasix and aldactone  - Low Na diet    Pure hypercholesterolemia   Patient is chronically on statin.will continue for now. Monitor clinically. Last LDL was   Lab Results   Component Value Date    LDLCALC 105.8 09/20/2022       VTE Risk Mitigation (From admission, onward)           Ordered     heparin (porcine) injection 5,000 Units  Every 8 hours         11/12/22 2054     IP VTE HIGH RISK PATIENT  Once         11/12/22 2055      Place sequential compression device  Until discontinued         11/12/22 2055                       Anette Pereyra NP  Department of Hospital Medicine   [facility]

## 2022-11-13 NOTE — SUBJECTIVE & OBJECTIVE
Interval History: KEM WILLSON. Patient concerned with slow wound healing and is anxious to be discharged home. States her current wound care regimen is not enough to maintain continued healing. Discussed wound care options and plan of care with patient. Wound care to see patient tomorrow, 11/14, and advise follow up needs. Patient denies fevers, chills, nausea, vomiting, chest pain, abdominal pain, constipation, diarrhea. Requesting help to reschedule missed Sports Medicine appointment for lower back pain/left hip bursitis. Case Management to assist with this.    Review of Systems   Constitutional:  Negative for appetite change, chills, diaphoresis, fatigue and fever.   HENT:  Negative for congestion, rhinorrhea and sinus pressure.    Eyes:  Negative for photophobia and visual disturbance.   Respiratory:  Negative for cough, shortness of breath and wheezing.    Cardiovascular:  Negative for chest pain and leg swelling.   Gastrointestinal:  Negative for abdominal distention, abdominal pain, diarrhea, nausea and vomiting.   Genitourinary:  Negative for difficulty urinating, dysuria and hematuria.   Musculoskeletal:  Positive for back pain and myalgias. Negative for arthralgias, gait problem and neck pain.   Skin:  Positive for wound (RLE). Negative for color change, pallor and rash.   Neurological:  Negative for dizziness, syncope, weakness, light-headedness and headaches.   Psychiatric/Behavioral:  Negative for agitation, confusion and hallucinations.      Objective:     Vital Signs (Most Recent):  Temp: 98.5 °F (36.9 °C) (11/13/22 1100)  Pulse: 66 (11/13/22 1100)  Resp: 17 (11/13/22 1100)  BP: 134/68 (11/13/22 1100)  SpO2: 100 % (11/13/22 1100) Vital Signs (24h Range):  Temp:  [97.5 °F (36.4 °C)-98.5 °F (36.9 °C)] 98.5 °F (36.9 °C)  Pulse:  [66-77] 66  Resp:  [17-19] 17  SpO2:  [96 %-100 %] 100 %  BP: (117-134)/(56-68) 134/68     Weight: 72.6 kg (160 lb 0.9 oz)  Body mass index is 26.63 kg/m².  No intake or output  data in the 24 hours ending 11/13/22 5518     Physical Exam  Vitals and nursing note reviewed.   Constitutional:       General: She is not in acute distress.     Appearance: She is not toxic-appearing or diaphoretic.   HENT:      Head: Normocephalic and atraumatic.      Nose: Nose normal.      Mouth/Throat:      Mouth: Mucous membranes are moist.   Eyes:      Pupils: Pupils are equal, round, and reactive to light.   Cardiovascular:      Rate and Rhythm: Normal rate and regular rhythm.      Pulses: Normal pulses.   Pulmonary:      Effort: Pulmonary effort is normal. No respiratory distress.      Breath sounds: No wheezing, rhonchi or rales.   Abdominal:      General: Bowel sounds are normal. There is no distension.      Palpations: Abdomen is soft.      Tenderness: There is no abdominal tenderness. There is no guarding.   Musculoskeletal:         General: No swelling, tenderness or deformity. Normal range of motion.      Cervical back: Normal range of motion. No tenderness.   Skin:     General: Skin is warm and dry.      Capillary Refill: Capillary refill takes less than 2 seconds.      Findings: Erythema and wound present.      Comments: Dressing to RLE, C/D/I   Neurological:      General: No focal deficit present.      Mental Status: She is alert and oriented to person, place, and time.      Sensory: No sensory deficit.      Motor: No weakness.   Psychiatric:         Mood and Affect: Mood normal.         Behavior: Behavior normal.       Significant Labs: All pertinent labs within the past 24 hours have been reviewed.    Significant Imaging: I have reviewed all pertinent imaging results/findings within the past 24 hours.

## 2022-11-13 NOTE — ASSESSMENT & PLAN NOTE
- Creatine stable for now  - BMP reviewed- noted Estimated Creatinine Clearance: 28.7 mL/min (based on SCr of 1.3 mg/dL). according to latest data.   - Monitor UOP and serial BMP and adjust therapy as needed.   - Renally dose meds.

## 2022-11-13 NOTE — ASSESSMENT & PLAN NOTE
-Continue ASA and statin.  -Keep lower extremities elevated.  -Has outpatient vascular follow up next week

## 2022-11-13 NOTE — ASSESSMENT & PLAN NOTE
Patient is identified as having Diastolic (HFpEF) heart failure that is Chronic. CHF is currently controlled. Latest ECHO performed and demonstrates- Results for orders placed during the hospital encounter of 10/07/22    Echo    Interpretation Summary  · The left ventricle is normal in size with concentric remodeling and normal systolic function.  · The estimated ejection fraction is 55-60%.  · Grade III left ventricular diastolic dysfunction.  · Mild mitral regurgitation.  · Normal right ventricular size with mildly reduced right ventricular systolic function.  · Severe tricuspid regurgitation.  · The estimated PA systolic pressure is 52 mmHg. PASP may be under-estimated due to TR severity.  · Elevated central venous pressure (15 mmHg).  · There is pulmonary hypertension.  · Severe left atrial enlargement.  . Continue Furosemide Aldactone and monitor clinical status closely. Monitor on telemetry. Patient is off CHF pathway.  Monitor strict Is&Os and daily weights.  Place on fluid restriction of 1.5 L. Continue to stress to patient importance of self efficacy and  on diet for CHF.   -Appears euvolemic on exam.

## 2022-11-13 NOTE — HOSPITAL COURSE
"Jenniffer Jimenez was admitted to Observation for RLE wound care and case management consult. Patient reports concern for continued wound drainage and slow healing. Discussed outpatient wound care possibilities with CM. Patient complaining of urinary frequency, denied dysuria or hematuria. UA with 3+ Leuks and >100 WBCs. Gave Augmentin 500 mg BID x 7 days. Patient reported an episode of vision changes, confusion, and BLE weakness. Nurse was called and assessed patient at bedside. Patient was hysterical, stating she was scared and unsure of what happened. She reported "the bed was standing up, and I was going to fall over". However, was still in bed. No fall or injury occurred. Bladder/bowel continence was maintained. Patient was examined by me at bedside. Cranial nerve exam wnl. BUE strength 5/5 throughout. Patient unable to perform straight leg raise and knee flexion 2/2 to weakness. Plantar- and dorsiflexion with 5/5 strength. Sensation intact throughout BLE. However, patient endorsed numbness, but denied pain. At this time of my exam, she became very distressed and began crying. Patient was eventually consolable and then proceeded to use her walker with assistance to walk to the bathroom. A CT head was performed to rule out etiology, no evidence of acute hemorrhage or major vascular distribution infarct observed. Patient at baseline without deficits or complaints other than concern for wound healing prior to discharge. Wound Care performed dressing change, recommending changing dressing every 4 days. Patient has home health, care at home, and wound care orders placed.  "

## 2022-11-13 NOTE — ASSESSMENT & PLAN NOTE
- Continue ASA and statin  - Keep lower extremities elevated  - Has outpatient vascular follow up next week

## 2022-11-13 NOTE — ASSESSMENT & PLAN NOTE
Follows with wound care and ID outpatient.  - SIRS 0/4  - Lactate 1.0  - PRN tylenol for pain  - wound care consulted  - Follow up with Vascular Surgery next week.

## 2022-11-14 ENCOUNTER — EXTERNAL HOME HEALTH (OUTPATIENT)
Dept: HOME HEALTH SERVICES | Facility: HOSPITAL | Age: 87
End: 2022-11-14
Payer: MEDICARE

## 2022-11-14 VITALS
WEIGHT: 160.06 LBS | HEIGHT: 65 IN | DIASTOLIC BLOOD PRESSURE: 60 MMHG | OXYGEN SATURATION: 98 % | HEART RATE: 71 BPM | SYSTOLIC BLOOD PRESSURE: 123 MMHG | RESPIRATION RATE: 17 BRPM | BODY MASS INDEX: 26.67 KG/M2 | TEMPERATURE: 98 F

## 2022-11-14 LAB
ALBUMIN SERPL BCP-MCNC: 2.9 G/DL (ref 3.5–5.2)
ALP SERPL-CCNC: 108 U/L (ref 55–135)
ALT SERPL W/O P-5'-P-CCNC: 21 U/L (ref 10–44)
ANION GAP SERPL CALC-SCNC: 9 MMOL/L (ref 8–16)
AST SERPL-CCNC: 25 U/L (ref 10–40)
BACTERIA #/AREA URNS AUTO: ABNORMAL /HPF
BASOPHILS # BLD AUTO: 0.02 K/UL (ref 0–0.2)
BASOPHILS NFR BLD: 0.6 % (ref 0–1.9)
BILIRUB SERPL-MCNC: 0.4 MG/DL (ref 0.1–1)
BILIRUB UR QL STRIP: NEGATIVE
BUN SERPL-MCNC: 35 MG/DL (ref 8–23)
CALCIUM SERPL-MCNC: 8.9 MG/DL (ref 8.7–10.5)
CHLORIDE SERPL-SCNC: 107 MMOL/L (ref 95–110)
CLARITY UR REFRACT.AUTO: ABNORMAL
CO2 SERPL-SCNC: 24 MMOL/L (ref 23–29)
COLOR UR AUTO: YELLOW
CREAT SERPL-MCNC: 1.2 MG/DL (ref 0.5–1.4)
DIFFERENTIAL METHOD: ABNORMAL
EOSINOPHIL # BLD AUTO: 0 K/UL (ref 0–0.5)
EOSINOPHIL NFR BLD: 1.1 % (ref 0–8)
ERYTHROCYTE [DISTWIDTH] IN BLOOD BY AUTOMATED COUNT: 15.5 % (ref 11.5–14.5)
EST. GFR  (NO RACE VARIABLE): 43 ML/MIN/1.73 M^2
GLUCOSE SERPL-MCNC: 99 MG/DL (ref 70–110)
GLUCOSE UR QL STRIP: NEGATIVE
HCT VFR BLD AUTO: 27.6 % (ref 37–48.5)
HGB BLD-MCNC: 9.2 G/DL (ref 12–16)
HGB UR QL STRIP: NEGATIVE
IMM GRANULOCYTES # BLD AUTO: 0.02 K/UL (ref 0–0.04)
IMM GRANULOCYTES NFR BLD AUTO: 0.6 % (ref 0–0.5)
KETONES UR QL STRIP: NEGATIVE
LEUKOCYTE ESTERASE UR QL STRIP: ABNORMAL
LYMPHOCYTES # BLD AUTO: 0.9 K/UL (ref 1–4.8)
LYMPHOCYTES NFR BLD: 23.9 % (ref 18–48)
MAGNESIUM SERPL-MCNC: 1.9 MG/DL (ref 1.6–2.6)
MCH RBC QN AUTO: 29.8 PG (ref 27–31)
MCHC RBC AUTO-ENTMCNC: 33.3 G/DL (ref 32–36)
MCV RBC AUTO: 89 FL (ref 82–98)
MICROSCOPIC COMMENT: ABNORMAL
MONOCYTES # BLD AUTO: 0.5 K/UL (ref 0.3–1)
MONOCYTES NFR BLD: 13.2 % (ref 4–15)
NEUTROPHILS # BLD AUTO: 2.2 K/UL (ref 1.8–7.7)
NEUTROPHILS NFR BLD: 60.6 % (ref 38–73)
NITRITE UR QL STRIP: NEGATIVE
NRBC BLD-RTO: 0 /100 WBC
PH UR STRIP: 6 [PH] (ref 5–8)
PHOSPHATE SERPL-MCNC: 4.5 MG/DL (ref 2.7–4.5)
PLATELET # BLD AUTO: 210 K/UL (ref 150–450)
PMV BLD AUTO: 10.3 FL (ref 9.2–12.9)
POCT GLUCOSE: 104 MG/DL (ref 70–110)
POCT GLUCOSE: 94 MG/DL (ref 70–110)
POTASSIUM SERPL-SCNC: 4.4 MMOL/L (ref 3.5–5.1)
PROT SERPL-MCNC: 5.7 G/DL (ref 6–8.4)
PROT UR QL STRIP: NEGATIVE
RBC # BLD AUTO: 3.09 M/UL (ref 4–5.4)
RBC #/AREA URNS AUTO: 2 /HPF (ref 0–4)
SODIUM SERPL-SCNC: 140 MMOL/L (ref 136–145)
SP GR UR STRIP: 1.01 (ref 1–1.03)
SQUAMOUS #/AREA URNS AUTO: 0 /HPF
URN SPEC COLLECT METH UR: ABNORMAL
WBC # BLD AUTO: 3.56 K/UL (ref 3.9–12.7)
WBC #/AREA URNS AUTO: >100 /HPF (ref 0–5)

## 2022-11-14 PROCEDURE — 85025 COMPLETE CBC W/AUTO DIFF WBC: CPT | Performed by: NURSE PRACTITIONER

## 2022-11-14 PROCEDURE — 36415 COLL VENOUS BLD VENIPUNCTURE: CPT | Performed by: NURSE PRACTITIONER

## 2022-11-14 PROCEDURE — 84100 ASSAY OF PHOSPHORUS: CPT | Performed by: NURSE PRACTITIONER

## 2022-11-14 PROCEDURE — 87088 URINE BACTERIA CULTURE: CPT

## 2022-11-14 PROCEDURE — 25000003 PHARM REV CODE 250: Performed by: NURSE PRACTITIONER

## 2022-11-14 PROCEDURE — G0378 HOSPITAL OBSERVATION PER HR: HCPCS

## 2022-11-14 PROCEDURE — 87086 URINE CULTURE/COLONY COUNT: CPT

## 2022-11-14 PROCEDURE — 87186 SC STD MICRODIL/AGAR DIL: CPT

## 2022-11-14 PROCEDURE — 96372 THER/PROPH/DIAG INJ SC/IM: CPT | Performed by: NURSE PRACTITIONER

## 2022-11-14 PROCEDURE — 80053 COMPREHEN METABOLIC PANEL: CPT | Performed by: NURSE PRACTITIONER

## 2022-11-14 PROCEDURE — 83735 ASSAY OF MAGNESIUM: CPT | Performed by: NURSE PRACTITIONER

## 2022-11-14 PROCEDURE — 99217 PR OBSERVATION CARE DISCHARGE: ICD-10-PCS | Mod: ,,,

## 2022-11-14 PROCEDURE — 99217 PR OBSERVATION CARE DISCHARGE: CPT | Mod: ,,,

## 2022-11-14 PROCEDURE — 63600175 PHARM REV CODE 636 W HCPCS: Performed by: NURSE PRACTITIONER

## 2022-11-14 PROCEDURE — 81001 URINALYSIS AUTO W/SCOPE: CPT

## 2022-11-14 PROCEDURE — 87077 CULTURE AEROBIC IDENTIFY: CPT

## 2022-11-14 RX ORDER — NITROFURANTOIN 25; 75 MG/1; MG/1
100 CAPSULE ORAL 2 TIMES DAILY
Qty: 10 CAPSULE | Refills: 0 | Status: SHIPPED | OUTPATIENT
Start: 2022-11-14 | End: 2022-11-14 | Stop reason: ALTCHOICE

## 2022-11-14 RX ORDER — AMOXICILLIN AND CLAVULANATE POTASSIUM 500; 125 MG/1; MG/1
1 TABLET, FILM COATED ORAL 2 TIMES DAILY
Qty: 14 TABLET | Refills: 0 | Status: SHIPPED | OUTPATIENT
Start: 2022-11-14 | End: 2022-11-17

## 2022-11-14 RX ADMIN — SPIRONOLACTONE 25 MG: 25 TABLET, FILM COATED ORAL at 08:11

## 2022-11-14 RX ADMIN — ACETAMINOPHEN 1000 MG: 500 TABLET ORAL at 08:11

## 2022-11-14 RX ADMIN — FUROSEMIDE 20 MG: 20 TABLET ORAL at 04:11

## 2022-11-14 RX ADMIN — HEPARIN SODIUM 5000 UNITS: 5000 INJECTION INTRAVENOUS; SUBCUTANEOUS at 02:11

## 2022-11-14 RX ADMIN — HEPARIN SODIUM 5000 UNITS: 5000 INJECTION INTRAVENOUS; SUBCUTANEOUS at 06:11

## 2022-11-14 RX ADMIN — METHOCARBAMOL 500 MG: 500 TABLET ORAL at 05:11

## 2022-11-14 RX ADMIN — METHOCARBAMOL 500 MG: 500 TABLET ORAL at 06:11

## 2022-11-14 RX ADMIN — FUROSEMIDE 20 MG: 20 TABLET ORAL at 06:11

## 2022-11-14 RX ADMIN — PRAVASTATIN SODIUM 40 MG: 40 TABLET ORAL at 08:11

## 2022-11-14 RX ADMIN — CHOLECALCIFEROL TAB 25 MCG (1000 UNIT) 1000 UNITS: 25 TAB at 08:11

## 2022-11-14 RX ADMIN — AZELASTINE HYDROCHLORIDE 137 MCG: 137 SPRAY, METERED NASAL at 08:11

## 2022-11-14 RX ADMIN — ASPIRIN 81 MG: 81 TABLET, COATED ORAL at 08:11

## 2022-11-14 NOTE — NURSING
" Pt iv dc'd. AVS discussed. No further questions were asked at this time. Informed pt daughter that she would be discharged. Pt daughter said she could be here "a little after 1830"  "

## 2022-11-14 NOTE — PLAN OF CARE
Problem: Adult Inpatient Plan of Care  Goal: Plan of Care Review  Outcome: Ongoing, Progressing     Problem: Impaired Wound Healing  Goal: Optimal Wound Healing  Outcome: Ongoing, Progressing     Problem: Fall Injury Risk  Goal: Absence of Fall and Fall-Related Injury  Outcome: Ongoing, Progressing

## 2022-11-14 NOTE — PROGRESS NOTES
[facility]  Wound Care    Patient Name:  Jenniffer Jimenez   MRN:  [id number]  Date: 2022  Diagnosis: Non-healing wound of right lower extremity    History:     Past Medical History:   Diagnosis Date    Amblyopia of left eye 4/10/2013    Anxiety     Arthritis     Facet arthropathy, Lumbosacral    Cataract     Central retinal vein occlusion of left eye     Depression     Diabetic polyneuropathy 2022    Exotropia of both eyes 2013    recession RSR 5.0mm w/ adj; recession LR os 5.0 w/ adj; resect MR os  4.0mm    Hearing loss     History of resection of small bowel     Hypertensive retinopathy of both eyes     Hypoglycemia     Macular degeneration     OA (osteoarthritis) of shoulder     Right    Osteoporosis     Posterior vitreous detachment of both eyes     Rhinitis     TIA (transient ischemic attack)        Social History     Socioeconomic History    Marital status:    Occupational History    Occupation: retired   Tobacco Use    Smoking status: Former     Types: Cigarettes     Quit date: 10/29/1982     Years since quittin.0     Passive exposure: Never    Smokeless tobacco: Never   Substance and Sexual Activity    Alcohol use: Yes     Alcohol/week: 2.0 standard drinks     Types: 2 Shots of liquor per week     Comment: rare    Drug use: No    Sexual activity: Never   Other Topics Concern    Are you pregnant or think you may be? No    Breast-feeding No     Social Determinants of Health     Financial Resource Strain: Low Risk     Difficulty of Paying Living Expenses: Not hard at all   Food Insecurity: No Food Insecurity    Worried About Running Out of Food in the Last Year: Never true    Ran Out of Food in the Last Year: Never true   Transportation Needs: No Transportation Needs    Lack of Transportation (Medical): No    Lack of Transportation (Non-Medical): No   Physical Activity: Inactive    Days of Exercise per Week: 0 days    Minutes of Exercise per Session: 0 min   Stress:  Stress Concern Present    Feeling of Stress : To some extent   Social Connections: Socially Isolated    Frequency of Communication with Friends and Family: Once a week    Frequency of Social Gatherings with Friends and Family: Once a week    Attends [employer] Services: Never    Active Member of Clubs or Organizations: Yes    Attends Club or Organization Meetings: Never    Marital Status:    Housing Stability: Low Risk     Unable to Pay for Housing in the Last Year: No    Number of Places Lived in the Last Year: 1    Unstable Housing in the Last Year: No       Precautions:     Allergies as of 11/12/2022 - Reviewed 11/12/2022   Allergen Reaction Noted    Opioids - morphine analogues Other (See Comments) 08/15/2018    Tizanidine Other (See Comments) 04/06/2018    Tramadol Hallucinations 01/18/2013    Beta-blockers (beta-adrenergic blocking agts) Other (See Comments) 10/23/2013    Morphine  12/24/2021    Opioids-meperidine and related  01/04/2022       WO Assessment Details/Treatment   Patient seen at bedside for RLE. Patient has controlled CHF. On previous admission vascular applied hema boot. Patient has vascular FU scheduled for next week. One small open area at base of wound to RLE remainder of Anterior RLE has intact, red, thin, fragile, shiny, skin. ABIs performed on 11/9 and is 1.17 normal. Cleansed dry skin with bath wipes, Applied contact silver layer to open area and red area to assist with prevention of infection, wrapped with two layer calamine compression wrap to control edema and help circulation. Change every 4 days.             11/14/22 1045   WOCN Assessment   WOCN Total Time (mins) 60   Visit Date 11/14/22   Visit Time 1045   Consult Type New   WOCN Speciality Wound   Procedure compression   Intervention assessed;changed;chart review;applied;coordination of care;orders   Teaching on-going        Altered Skin Integrity 09/25/22 2010 Right anterior;lower Leg #1   Date First Assessed/Time First  Assessed: 09/25/22 2010   Altered Skin Integrity Present on Admission: yes  Side: Right  Orientation: anterior;lower  Location: Leg  Wound Number: #1   Wound Image     Dressing Appearance Dry;Intact;Clean   Drainage Amount Scant   Drainage Characteristics/Odor Serous   Appearance Pink;Red;Epithelialization   Periwound Area Dry;Redness   Wound Length (cm) 19 cm   Wound Width (cm) 11 cm   Wound Depth (cm) 0 cm  (small open  area lower leg depth of 0.2 length 0.1 width 0.1)   Wound Volume (cm^3) 0 cm^3   Wound Surface Area (cm^2) 209 cm^2   Care Cleansed with:;Other (see comments)  (white bath wipes)   Dressing Removed;Applied;Compression wrap;Silver   Compression Two layer compression   Dressing Change Due 11/17/22 11/14/2022

## 2022-11-14 NOTE — PLAN OF CARE
[facility]      HOME HEALTH ORDERS  FACE TO FACE ENCOUNTER    Patient Name: Jenniffer Jimenez  YOB: 1932    PCP: Rubi Young DO   PCP Address: [address] [facility] / [place] [address]  PCP Phone Number: [phone]  PCP Fax: [phone]    Encounter Date: 11/12/22    Admit to Home Health    Diagnoses:  Active Hospital Problems    Diagnosis  POA    *Non-healing wound of right lower extremity [S81.801A]  Yes     Priority: 1 - High    Unable to care for self [Z78.9]  Yes    Diabetic polyneuropathy [E11.42]  Yes    Normocytic anemia [D64.9]  Yes    Presence of Watchman left atrial appendage closure device [Z95.818]  Yes     Chronic    Venous insufficiency of both lower extremities [I87.2]  Yes    Stage 3b chronic kidney disease [N18.32]  Yes    Chronic diastolic heart failure [I50.32]  Yes     Chronic     TTE (10/7/2022):    The left ventricle is normal in size with concentric remodeling and normal systolic function.  The estimated ejection fraction is 55-60%.  Grade III left ventricular diastolic dysfunction.  Mild mitral regurgitation.  Normal right ventricular size with mildly reduced right ventricular systolic function.  Severe tricuspid regurgitation.  The estimated PA systolic pressure is 52 mmHg. PASP may be under-estimated due to TR severity.  Elevated central venous pressure (15 mmHg).  There is pulmonary hypertension.  Severe left atrial enlargement.           History of TIA (transient ischemic attack) [Z86.73]  Not Applicable    Chronic atrial fibrillation [I48.20]  Yes     Chronic    Meniere's disease [H81.09]  Yes    Primary hypertension [I10]  Yes     Chronic    Pure hypercholesterolemia [E78.00]  Yes     Chronic      Resolved Hospital Problems   No resolved problems to display.       Follow Up Appointments:  Future Appointments   Date Time Provider Department Center   11/17/2022 11:00 AM Dede Chavez MD [facility] WOUND [facility]   11/21/2022  3:00 PM  "Wolf Garcia MD [facility]   3/30/2023 10:00 AM Rubi Young DO [facility] IM [place]       Allergies:  Review of patient's allergies indicates:   Allergen Reactions    Opioids - morphine analogues Other (See Comments)     Bowel issues; bowel obstruction    Tizanidine Other (See Comments)     "Lips were numb,  Almost passed out."    Tramadol Hallucinations    Beta-blockers (beta-adrenergic blocking agts) Other (See Comments)     Can not go on beta blockers for long period of time - due to taking allergy injections    Morphine     Opioids-meperidine and related        Medications: Review discharge medications with patient and family and provide education.    Current Facility-Administered Medications   Medication Dose Route Frequency Provider Last Rate Last Admin    acetaminophen tablet 1,000 mg  1,000 mg Oral Q8H PRN Anette Pereyra NP   1,000 mg at 11/14/22 0806    acetaminophen tablet 650 mg  650 mg Oral Q4H PRN Anette Pereyra NP        albuterol-ipratropium 2.5 mg-0.5 mg/3 mL nebulizer solution 3 mL  3 mL Nebulization Q4H PRN Anette Pereyra NP        aluminum-magnesium hydroxide-simethicone 200-200-20 mg/5 mL suspension 30 mL  30 mL Oral QID PRN Anette Pereyra NP        aspirin EC tablet 81 mg  81 mg Oral Daily Anette Pereyra NP   81 mg at 11/14/22 0806    azelastine 137 mcg (0.1 %) nasal spray 137 mcg  1 spray Nasal BID Aentte Pereyra NP   137 mcg at 11/14/22 0809    bisacodyL suppository 10 mg  10 mg Rectal Daily PRN Anette Pereyra NP        dextrose 10% bolus 125 mL  12.5 g Intravenous PRN Anette Pereyra NP        dextrose 10% bolus 250 mL  25 g Intravenous PRN Anette Pereyra NP        fluticasone propionate 50 mcg/actuation nasal spray 50 mcg  1 spray Each Nostril Daily PRN Anette Pereyra NP        furosemide tablet 20 mg  20 mg Oral BID AC Anette Pereyra NP   20 mg at 11/14/22 1640    glucagon (human recombinant) injection 1 mg  1 mg Intramuscular PRN " Anette Pereyra NP        glucose chewable tablet 16 g  16 g Oral PRN Anette Pereyra NP        glucose chewable tablet 24 g  24 g Oral PRN Anette Pereyra NP        heparin (porcine) injection 5,000 Units  5,000 Units Subcutaneous Q8H Anette Pereyra NP   5,000 Units at 11/14/22 1400    hydrOXYzine HCL tablet 25 mg  25 mg Oral TID PRN Anette Pereyra NP        insulin aspart U-100 pen 0-5 Units  0-5 Units Subcutaneous QID (AC + HS) PRN Anette Pereyra NP        melatonin tablet 6 mg  6 mg Oral Nightly PRN Anette Pereyra NP   6 mg at 11/12/22 2203    methocarbamoL tablet 500 mg  500 mg Oral BID PRN Anette Pereyra NP   500 mg at 11/14/22 0633    naloxone 0.4 mg/mL injection 0.02 mg  0.02 mg Intravenous PRN Anette Pereyra NP        ondansetron disintegrating tablet 8 mg  8 mg Oral Q8H PRN Anette Pereyra NP        polyethylene glycol packet 17 g  17 g Oral BID PRN Anette Pereyra NP        pravastatin tablet 40 mg  40 mg Oral Daily Anette Pereyra NP   40 mg at 11/14/22 0806    prochlorperazine injection Soln 5 mg  5 mg Intravenous Q6H PRN Anette Pereyra NP        psyllium husk (aspartame) 3.4 gram packet 1 packet  1 packet Oral Daily PRN Anette Pereyra NP        simethicone chewable tablet 80 mg  1 tablet Oral QID PRN Anette Pereyra NP        sodium chloride 0.9% flush 5 mL  5 mL Intravenous PRN Anette Pereyra NP        spironolactone tablet 25 mg  25 mg Oral Daily Anette Pereyra NP   25 mg at 11/14/22 0806    vitamin D 1000 units tablet 1,000 Units  1,000 Units Oral Daily Anette Pereyra NP   1,000 Units at 11/14/22 0805     Current Discharge Medication List        CONTINUE these medications which have NOT CHANGED    Details   acetaminophen (TYLENOL) 500 MG tablet Take 1,000 mg by mouth 2 (two) times a day.      aspirin (ECOTRIN) 81 MG EC tablet Take 1 tablet (81 mg total) by mouth once daily.  Qty: 90 tablet, Refills: 3    Associated Diagnoses: Chronic  atrial fibrillation      azelastine (ASTELIN) 137 mcg (0.1 %) nasal spray 1 spray (137 mcg total) by Nasal route 2 (two) times daily.  Qty: 30 mL, Refills: 1    Associated Diagnoses: Acute bacterial sinusitis      fluticasone propionate (FLONASE) 50 mcg/actuation nasal spray USE 1 SPRAY IN EACH NOSTRIL ONE TIME DAILY  Qty: 32 g, Refills: 11      furosemide (LASIX) 20 MG tablet Take 1 tablet (20 mg total) by mouth 2 (two) times daily before meals. Take twice a day for Weight greater than 160 lb, once a day for weights less than 160 lb  Qty: 180 tablet, Refills: 3    Associated Diagnoses: Chronic diastolic heart failure      guaiFENesin 100 mg/5 ml (ROBITUSSIN) 100 mg/5 mL syrup Take 15 mLs by mouth once daily.      methocarbamoL (ROBAXIN) 500 MG Tab Take 1 tablet (500 mg total) by mouth 2 (two) times daily as needed (muscle cramps).  Qty: 20 tablet, Refills: 0      miconazole nitrate 2% (MICOTIN) 2 % Oint Apply topically every other day.  Qty: 57 g, Refills: 2      mupirocin (BACTROBAN) 2 % ointment Apply topically 3 (three) times daily. Apply locally every other day  Qty: 22 g, Refills: 2      pravastatin (PRAVACHOL) 40 MG tablet Take 1 tablet (40 mg total) by mouth once daily.  Qty: 90 tablet, Refills: 3    Associated Diagnoses: Pure hypercholesterolemia      spironolactone (ALDACTONE) 25 MG tablet Take twice a day for Weight greater than 160 lb, once a day for weights less than 160 lb  Qty: 180 tablet, Refills: 3    Comments: .  She will take 1 or 2 tablets daily as needed for swelling  Associated Diagnoses: Chronic diastolic heart failure; Essential hypertension      vit C/E/Zn/coppr/lutein/zeaxan (OCUVITE LUTEIN AND ZEAXANTHIN ORAL) Take 1 capsule by mouth once daily. Lutien 25 mg, Zeaxanthin 5 mg      vitamin E 400 UNIT capsule Take 400 Units by mouth once daily.               I have seen and examined this patient within the last 30 days. My clinical findings that support the need for the home health skilled  services and home bound status are the following:no   Requiring assistive device to leave home due to unsteady gait caused by  RLE wound.     Diet:   cardiac diet and diabetic diet 2000 calorie      Referrals/ Consults  Physical Therapy to evaluate and treat. Evaluate for home safety and equipment needs; Establish/upgrade home exercise program. Perform / instruct on therapeutic exercises, gait training, transfer training, and Range of Motion.  Occupational Therapy to evaluate and treat. Evaluate home environment for safety and equipment needs. Perform/Instruct on transfers, ADL training, ROM, and therapeutic exercises.  Aide to provide assistance with personal care, ADLs, and vital signs.    Activities:   activity as tolerated    Nursing:   Agency to admit patient within 24 hours of hospital discharge unless specified on physician order or at patient request    SN to complete comprehensive assessment including routine vital signs. Instruct on disease process and s/s of complications to report to MD. Review/verify medication list sent home with the patient at time of discharge  and instruct patient/caregiver as needed. Frequency may be adjusted depending on start of care date.     Skilled nurse to perform up to 3 visits PRN for symptoms related to diagnosis    Notify MD if SBP > 160 or < 90; DBP > 90 or < 50; HR > 120 or < 50; Temp > 101; O2 < 88%;     Ok to schedule additional visits based on staff availability and patient request on consecutive days within the home health episode.    When multiple disciplines ordered:    Start of Care occurs on Sunday - Wednesday schedule remaining discipline evaluations as ordered on separate consecutive days following the start of care.    Thursday SOC -schedule subsequent evaluations Friday and Monday the following week.     Friday - Saturday SOC - schedule subsequent discipline evaluations on consecutive days starting Monday of the following week.    For all post-discharge  communication and subsequent orders please contact patient's primary care physician.       Home Health Aide:  Nursing Weekly, Physical Therapy Twice weekly, Occupational Therapy Twice weekly, and Home Health Aide Three times weekly    Wound Care Orders  yes:  Patient seen at bedside for RLE. Patient has controlled CHF. On previous admission vascular applied hema boot. Patient has vascular FU scheduled for next week. One small open area at base of wound to RLE remainder of Anterior RLE has intact, red, thin, fragile, shiny, skin. ABIs performed on 11/9 and is 1.17 normal. Cleansed dry skin with bath wipes, Applied contact silver layer to open area and red area to assist with prevention of infection, wrapped with two layer calamine compression wrap to control edema and help circulation. Change every 4 days.    I certify that this patient is confined to her home and needs intermittent skilled nursing care, physical therapy, and occupational therapy.

## 2022-11-14 NOTE — PLAN OF CARE
[facility]  Discharge Assessment    Primary Care Provider: Rbui Young,      Discharge Assessment (most recent)       BRIEF DISCHARGE ASSESSMENT - 11/14/22 1100          Discharge Planning    Assessment Type Discharge Planning Brief Assessment     Resource/Environmental Concerns none     Support Systems Children     Equipment Currently Used at Home rollator;cane, straight;grab bar     Current Living Arrangements home/apartment/condo     Patient/Family Anticipates Transition to home with help/services     Patient/Family Anticipated Services at Transition home health care;outpatient care     DME Needed Upon Discharge  none     Discharge Plan A Home;Home Health     Discharge Plan B Home with family        Physical Activity    On average, how many days per week do you engage in moderate to strenuous exercise (like a brisk walk)? 0 days     On average, how many minutes do you engage in exercise at this level? 0 min        Housing Stability    In the last 12 months, how many places have you lived? 1        Transportation Needs    In the past 12 months, has lack of transportation kept you from medical appointments or from getting medications? No     In the past 12 months, has lack of transportation kept you from meetings, work, or from getting things needed for daily living? No        Food Insecurity    Within the past 12 months, you worried that your food would run out before you got the money to buy more. Never true        Stress    Do you feel stress - tense, restless, nervous, or anxious, or unable to sleep at night because your mind is troubled all the time - these days? To some extent        Social Connections    How often do you attend Synagogue or Synagogue services? Never     Are you , , , , never , or living with a partner?                    Lives alone with her cat in a 5th floor apt, building has elevator. Patient gets meals on wheels. Current  with [facility] and are aware of patient discharging.

## 2022-11-15 ENCOUNTER — TELEPHONE (OUTPATIENT)
Dept: INTERNAL MEDICINE | Facility: CLINIC | Age: 87
End: 2022-11-15
Payer: MEDICARE

## 2022-11-15 NOTE — TELEPHONE ENCOUNTER
----- Message from Kandy Stoddard sent at 11/15/2022  9:55 AM CST -----  Type: Patient Call Back    Who called: Amy- [facility]     What is the request in detail: patient need OT extension orders and Physical therapy orders, She says she can except a verbal. Please call     Can the clinic reply by [facility]?    Would the patient rather a call back or a response via [facility]? Call     Best call back number: [phone]

## 2022-11-17 ENCOUNTER — HOSPITAL ENCOUNTER (OUTPATIENT)
Dept: WOUND CARE | Facility: HOSPITAL | Age: 87
Discharge: HOME | End: 2022-11-17
Attending: SURGERY
Payer: MEDICARE

## 2022-11-17 ENCOUNTER — TELEPHONE (OUTPATIENT)
Dept: INTERNAL MEDICINE | Facility: CLINIC | Age: 87
End: 2022-11-17
Payer: MEDICARE

## 2022-11-17 VITALS
HEIGHT: 65 IN | DIASTOLIC BLOOD PRESSURE: 61 MMHG | WEIGHT: 160 LBS | HEART RATE: 82 BPM | TEMPERATURE: 98 F | SYSTOLIC BLOOD PRESSURE: 135 MMHG | BODY MASS INDEX: 26.66 KG/M2

## 2022-11-17 DIAGNOSIS — L03.115 CELLULITIS OF RIGHT LOWER EXTREMITY: Primary | ICD-10-CM

## 2022-11-17 LAB — BACTERIA UR CULT: ABNORMAL

## 2022-11-17 PROCEDURE — 29581 APPL MULTLAYER CMPRN SYS LEG: CPT

## 2022-11-17 RX ORDER — GENTAMICIN SULFATE 1 MG/G
OINTMENT TOPICAL 3 TIMES DAILY
COMMUNITY
End: 2022-11-21

## 2022-11-17 RX ORDER — CIPROFLOXACIN 500 MG/1
500 TABLET ORAL 2 TIMES DAILY
COMMUNITY
End: 2022-12-06

## 2022-11-17 NOTE — TELEPHONE ENCOUNTER
PT states she will contact orthro for pain meds, since the provider is out today and can't see her.

## 2022-11-17 NOTE — PROGRESS NOTES
[facility]    Subjective:       Patient ID: Jenniffer Liam Jimenez is a 89 y.o. female.    Chief Complaint: Non-healing Wound Follow Up    Follow up rle cellulitis.  No complaints per patient. Changed antibiotic oral to Cipro and topical Gentamicin Ointment. New plan of care discussed with patient and daughter, verbalized understanding.  Home health updated with new orders. Follow up  2 weeks.   Review of Systems   Constitutional: Negative.    HENT: Negative.     Eyes: Negative.    Respiratory: Negative.     Cardiovascular: Negative.    Gastrointestinal: Negative.    Genitourinary: Negative.    Musculoskeletal: Negative.    Skin: Negative.    Neurological: Negative.    Psychiatric/Behavioral: Negative.         Objective:      Physical Exam  Constitutional:       Appearance: She is well-developed.   HENT:      Head: Normocephalic.   Eyes:      Conjunctiva/sclera: Conjunctivae normal.      Pupils: Pupils are equal, round, and reactive to light.   Cardiovascular:      Rate and Rhythm: Normal rate and regular rhythm.      Heart sounds: Normal heart sounds.   Pulmonary:      Effort: Pulmonary effort is normal.      Breath sounds: Normal breath sounds.   Abdominal:      General: Bowel sounds are normal.      Palpations: Abdomen is soft.   Musculoskeletal:         General: Normal range of motion.      Cervical back: Normal range of motion and neck supple.   Skin:     General: Skin is warm and dry.   Neurological:      Mental Status: She is alert and oriented to person, place, and time.      Deep Tendon Reflexes: Reflexes are normal and symmetric.        Altered Skin Integrity 09/25/22 2010 Right anterior;lower Leg #1 (Active)   09/25/22 2010   Altered Skin Integrity Present on Admission: yes   Side: Right   Orientation: anterior;lower   Location: Leg   Wound Number: #1   Is this injury device related?:    Primary Wound Type:    Description of Altered Skin Integrity:     Ankle-Brachial Index:    Pulses:    Removal Indication and Assessment:    Wound Outcome:    (Retired) Wound Length (cm):    (Retired) Wound Width (cm):    (Retired) Depth (cm):    Wound Description (Comments):    Removal Indications:    Wound Image    11/17/22 1148   Description of Altered Skin Integrity Partial thickness tissue loss. Shallow open ulcer with a red or pink wound bed, without slough. Intact or Open/Ruptured Serum-filled blister. 11/17/22 1148   Dressing Appearance Intact;Moist drainage 11/17/22 1148   Drainage Amount Large 11/17/22 1148   Drainage Characteristics/Odor Serous;Green 11/17/22 1148   Appearance Pink;Red;Moist 11/17/22 1148   Tissue loss description Partial thickness 11/17/22 1148   Red (%), Wound Tissue Color 100 % 11/17/22 1148   Periwound Area Edematous;Redness 11/17/22 1148   Wound Edges Undefined 11/17/22 1148   Wound Length (cm) 18 cm 11/17/22 1148   Wound Width (cm) 12 cm 11/17/22 1148   Wound Depth (cm) 0.1 cm 11/17/22 1148   Wound Volume (cm^3) 21.6 cm^3 11/17/22 1148   Wound Surface Area (cm^2) 216 cm^2 11/17/22 1148   Care Cleansed with:;Antimicrobial agent 11/17/22 1148   Dressing Applied;Non-adherent;Absorptive Pad;Compression wrap 11/17/22 1148   Periwound Care Dry periwound area maintained;Absorptive dressing applied 11/17/22 1148   Compression Two layer compression 11/17/22 1148   Off Loading Off loading shoe 11/17/22 1148   Dressing Change Due 11/21/22 11/17/22 1148         Assessment/Plan:         ICD-10-CM ICD-9-CM   1. Cellulitis of right lower extremity  L03.115 682.6           Tissue pathology and/or culture taken:  [] Yes [] No   Sharp debridement performed:   [] Yes [] No   Labs ordered this visit:   [] Yes [] No   Imaging ordered this visit:   [] Yes [] No           Orders Placed This Encounter   Procedures    Change dressing     Right Lower Leg Cellulitis:   Cleanse wound with: Soap and Water, Sterile Saline   Lidocaine:PRN   Silver nitrate: PRN   Periwound  care: Moisturizer   Primary dressing: Gentamicin, Adaptic, Soft Rolled Gauze (Wound area only)   Secondary dressing:  Co-Flex with Calamine  2 layer Compression   Offloading: Darco Shoe   Edema control: Elevate Rt. Leg Frequently   Frequency: Twice weekly and PRN per home Health  Follow-up: 2 Weeks with Dr. Chavez 12/1/22  Home Health: Dressing changes as above, except when in Wound Clinic.   Other Orders: RX faxed to Pharmacy for Gentamicin, and Cipro        Follow up in about 2 weeks (around 12/1/2022) for .

## 2022-11-18 ENCOUNTER — PATIENT MESSAGE (OUTPATIENT)
Dept: WOUND CARE | Facility: HOSPITAL | Age: 87
End: 2022-11-18
Payer: MEDICARE

## 2022-11-18 ENCOUNTER — PES CALL (OUTPATIENT)
Dept: ADMINISTRATIVE | Facility: CLINIC | Age: 87
End: 2022-11-18
Payer: MEDICARE

## 2022-11-19 ENCOUNTER — PATIENT MESSAGE (OUTPATIENT)
Dept: WOUND CARE | Facility: HOSPITAL | Age: 87
End: 2022-11-19
Payer: MEDICARE

## 2022-11-21 ENCOUNTER — INITIAL CONSULT (OUTPATIENT)
Dept: VASCULAR SURGERY | Facility: CLINIC | Age: 87
End: 2022-11-21
Payer: MEDICARE

## 2022-11-21 VITALS
HEIGHT: 65 IN | BODY MASS INDEX: 26.81 KG/M2 | DIASTOLIC BLOOD PRESSURE: 65 MMHG | WEIGHT: 160.94 LBS | TEMPERATURE: 99 F | SYSTOLIC BLOOD PRESSURE: 149 MMHG | HEART RATE: 79 BPM

## 2022-11-21 DIAGNOSIS — S81.801A NON-HEALING WOUND OF RIGHT LOWER EXTREMITY: ICD-10-CM

## 2022-11-21 DIAGNOSIS — I87.2 VENOUS INSUFFICIENCY OF BOTH LOWER EXTREMITIES: ICD-10-CM

## 2022-11-21 PROCEDURE — 99999 PR PBB SHADOW E&M-EST. PATIENT-LVL IV: ICD-10-PCS | Mod: PBBFAC,,, | Performed by: SURGERY

## 2022-11-21 PROCEDURE — 99214 PR OFFICE/OUTPT VISIT, EST, LEVL IV, 30-39 MIN: ICD-10-PCS | Mod: S$GLB,,, | Performed by: SURGERY

## 2022-11-21 PROCEDURE — 99214 OFFICE O/P EST MOD 30 MIN: CPT | Mod: S$GLB,,, | Performed by: SURGERY

## 2022-11-21 PROCEDURE — 99999 PR PBB SHADOW E&M-EST. PATIENT-LVL IV: CPT | Mod: PBBFAC,,, | Performed by: SURGERY

## 2022-11-21 RX ORDER — GENTAMICIN SULFATE 1 MG/G
CREAM TOPICAL
Status: ON HOLD | COMMUNITY
Start: 2022-11-17 | End: 2023-01-16 | Stop reason: HOSPADM

## 2022-11-21 RX ORDER — AMOXICILLIN AND CLAVULANATE POTASSIUM 875; 125 MG/1; MG/1
1 TABLET, FILM COATED ORAL 2 TIMES DAILY
Qty: 14 TABLET | Refills: 0 | Status: SHIPPED | OUTPATIENT
Start: 2022-11-21 | End: 2022-11-28 | Stop reason: SDUPTHER

## 2022-11-21 NOTE — PROGRESS NOTES
Jenniffer Jimenez  2022    HPI:  Patient is a 89 y.o. female with a h/o hypoglycemia, hearing loss, CHF, TIA, diabetic polyneuropathy who is here today for evaluation of  right leg wound. The patient states she had a minor trauma to her right knee approximately 3 months ago. Since that time she has had increased redness and swelling to the area and has been unable to see improvement with consistent wound care, which she receives every other day. She was recently admitted for this wound on 2022 and underwent ABIs which did not show any evidence of arterial compromise. Patient is currently on cipro. She notes increased weeping and pain to the touch.    Yes MI/stroke  Tobacco use: remote history, quit 40 years ago     Past Medical History:   Diagnosis Date    Amblyopia of left eye 4/10/2013    Anxiety     Arthritis     Facet arthropathy, Lumbosacral    Cataract     Central retinal vein occlusion of left eye     Depression     Diabetic polyneuropathy 2022    Exotropia of both eyes 2013    recession RSR 5.0mm w/ adj; recession LR os 5.0 w/ adj; resect MR os  4.0mm    Hearing loss     History of resection of small bowel     Hypertensive retinopathy of both eyes     Hypoglycemia     Macular degeneration     OA (osteoarthritis) of shoulder     Right    Osteoporosis     Posterior vitreous detachment of both eyes     Rhinitis     TIA (transient ischemic attack)      Past Surgical History:   Procedure Laterality Date    APPENDECTOMY      CARDIAC CATHETERIZATION      CATARACT EXTRACTION W/  INTRAOCULAR LENS IMPLANT Bilateral      SECTION, CLASSIC      CLOSURE OF LEFT ATRIAL APPENDAGE USING DEVICE N/A 2020    Procedure: Left atrial appendage closure device;  Surgeon: Abundio Curtis MD;  Location: [facility] CATH LAB;  Service: Cardiology;  Laterality: N/A;    HYSTERECTOMY      INNER EAR SURGERY      JOINT REPLACEMENT      LEFT KNEE REPLACEMENT IN 2013 -    OOPHORECTOMY      SINUS SURGERY       STRABISMUS SURGERY  13    RSR recession 5 mm, LLR recession 5 mm and LMR resection 4mm    STRABISMUS SURGERY  2014    recess LR OD 6mm    TONSILLECTOMY      watchman surgery N/A 2020     Family History   Problem Relation Age of Onset    Hypertension Mother     Hypertension Father     Liver disease Sister     Diabetes Sister     Heart disease Sister     Diabetes Brother     Breast cancer Maternal Aunt     No Known Problems Maternal Uncle     No Known Problems Paternal Aunt     No Known Problems Paternal Uncle     No Known Problems Maternal Grandmother     No Known Problems Maternal Grandfather     No Known Problems Paternal Grandmother     No Known Problems Paternal Grandfather     No Known Problems Daughter     No Known Problems Son     Heart disease Sister     No Known Problems Son     No Known Problems Son     Cancer Neg Hx         in first degree relatives    Melanoma Neg Hx     Psoriasis Neg Hx     Lupus Neg Hx     Amblyopia Neg Hx     Blindness Neg Hx     Cataracts Neg Hx     Glaucoma Neg Hx     Macular degeneration Neg Hx     Retinal detachment Neg Hx     Strabismus Neg Hx     Stroke Neg Hx     Thyroid disease Neg Hx      Social History     Socioeconomic History    Marital status:    Occupational History    Occupation: retired   Tobacco Use    Smoking status: Former     Types: Cigarettes     Quit date: 10/29/1982     Years since quittin.0     Passive exposure: Never    Smokeless tobacco: Never   Substance and Sexual Activity    Alcohol use: Yes     Alcohol/week: 2.0 standard drinks     Types: 2 Shots of liquor per week     Comment: rare    Drug use: No    Sexual activity: Never   Other Topics Concern    Are you pregnant or think you may be? No    Breast-feeding No     Social Determinants of Health     Financial Resource Strain: Low Risk     Difficulty of Paying Living Expenses: Not hard at all   Food Insecurity: No Food Insecurity    Worried About Running Out of Food in the Last Year:  Never true    Ran Out of Food in the Last Year: Never true   Transportation Needs: No Transportation Needs    Lack of Transportation (Medical): No    Lack of Transportation (Non-Medical): No   Physical Activity: Inactive    Days of Exercise per Week: 0 days    Minutes of Exercise per Session: 0 min   Stress: Stress Concern Present    Feeling of Stress : To some extent   Social Connections: Socially Isolated    Frequency of Communication with Friends and Family: Once a week    Frequency of Social Gatherings with Friends and Family: Once a week    Attends Congregation Services: Never    Active Member of Clubs or Organizations: Yes    Attends Club or Organization Meetings: Never    Marital Status:    Housing Stability: Low Risk     Unable to Pay for Housing in the Last Year: No    Number of Places Lived in the Last Year: 1    Unstable Housing in the Last Year: No       Current Outpatient Medications:     acetaminophen (TYLENOL) 500 MG tablet, Take 1,000 mg by mouth 2 (two) times a day., Disp: , Rfl:     aspirin (ECOTRIN) 81 MG EC tablet, Take 1 tablet (81 mg total) by mouth once daily., Disp: 90 tablet, Rfl: 3    azelastine (ASTELIN) 137 mcg (0.1 %) nasal spray, 1 spray (137 mcg total) by Nasal route 2 (two) times daily., Disp: 30 mL, Rfl: 1    ciprofloxacin HCl (CIPRO) 500 MG tablet, Take 500 mg by mouth 2 (two) times daily., Disp: , Rfl:     fluticasone propionate (FLONASE) 50 mcg/actuation nasal spray, USE 1 SPRAY IN EACH NOSTRIL ONE TIME DAILY, Disp: 32 g, Rfl: 11    furosemide (LASIX) 20 MG tablet, Take 1 tablet (20 mg total) by mouth 2 (two) times daily before meals. Take twice a day for Weight greater than 160 lb, once a day for weights less than 160 lb, Disp: 180 tablet, Rfl: 3    gentamicin (GARAMYCIN) 0.1 % cream, APPLY TOPICALLY TO THE AFFECTED AREA EVERY DAY AS NEEDED AS DIRECTED, Disp: , Rfl:     guaiFENesin 100 mg/5 ml (ROBITUSSIN) 100 mg/5 mL syrup, Take 15 mLs by mouth once daily., Disp: , Rfl:      miconazole nitrate 2% (MICOTIN) 2 % Oint, Apply topically every other day., Disp: 57 g, Rfl: 2    mupirocin (BACTROBAN) 2 % ointment, Apply topically 3 (three) times daily. Apply locally every other day, Disp: 22 g, Rfl: 2    pravastatin (PRAVACHOL) 40 MG tablet, Take 1 tablet (40 mg total) by mouth once daily., Disp: 90 tablet, Rfl: 3    spironolactone (ALDACTONE) 25 MG tablet, Take twice a day for Weight greater than 160 lb, once a day for weights less than 160 lb, Disp: 180 tablet, Rfl: 3    vit C/E/Zn/coppr/lutein/zeaxan (OCUVITE LUTEIN AND ZEAXANTHIN ORAL), Take 1 capsule by mouth once daily. Lutien 25 mg, Zeaxanthin 5 mg, Disp: , Rfl:     vitamin E 400 UNIT capsule, Take 400 Units by mouth once daily., Disp: , Rfl:     REVIEW OF SYSTEMS:  General: negative; ENT: negative; Allergy and Immunology: negative; Hematological and Lymphatic: Negative; Endocrine: negative; Respiratory: no cough, shortness of breath, or wheezing; Cardiovascular: no chest pain or dyspnea on exertion; Gastrointestinal: no abdominal pain/back, change in bowel habits, or bloody stools; Genito-Urinary: no dysuria, trouble voiding, or hematuria; Musculoskeletal: negative  Neurological: no TIA or stroke symptoms; Psychiatric: no nervousness, anxiety or depression.    PHYSICAL EXAM:   Right Arm BP - Sittin/65 (22 1527)  Left Arm BP - Sittin/70 (22 1527)  Pulse: 79  Temp: 98.5 °F (36.9 °C)      General appearance:  Alert, well-appearing, and in no distress.  Oriented to person, place, and time   Neurological: Normal speech, no focal findings noted; CN II - XII grossly intact           Musculoskeletal: Digits/nail without cyanosis/clubbing.  Normal muscle strength/tone.                 Neck: Supple, no significant adenopathy; thyroid is not enlarged                  No carotid bruit can be auscultated                Chest:  Clear to auscultation, no wheezes, rales or rhonchi, symmetric air entry     No use of accessory  muscles             Cardiac: Normal rate and regular rhythm, S1 and S2 normal; PMI non-displaced          Abdomen: Soft, nontender, nondistended, no masses or organomegaly     No rebound tenderness noted; bowel sounds normal     Pulsatile aortic mass is not palpable.     No groin adenopathy      Extremities:   2+ femoral pulses bilaterally     2+ pedal pulses palpable.     Desquamation over anterior shin. Erythema with sharp demarcation      No ulcerations    LAB RESULTS:  Lab Results   Component Value Date    K 4.4 11/14/2022    K 4.7 11/13/2022    K 5.1 11/12/2022    CREATININE 1.2 11/14/2022    CREATININE 1.3 11/13/2022    CREATININE 1.2 11/12/2022     Lab Results   Component Value Date    WBC 3.56 (L) 11/14/2022    WBC 3.10 (L) 11/13/2022    WBC 4.21 11/12/2022    HCT 27.6 (L) 11/14/2022    HCT 30.0 (L) 11/13/2022    HCT 30.7 (L) 11/12/2022     11/14/2022     11/13/2022     11/12/2022     Lab Results   Component Value Date    HGBA1C 6.0 (H) 09/20/2022    HGBA1C 5.7 (H) 09/30/2021    HGBA1C 5.6 06/25/2020     IMAGING:    IMP/PLAN:     89 y.o. female with 3 months of RLE cellulitis. The patient's cellulitis continues to increase in size regardless of wound care and antibiotics. Suspected strep infection and unlikely due to swelling or vascular supply.     Plan:   - RTC in 1 week with wound care   - PO Hussein Ramos MD  General Surgery   PGY-1

## 2022-11-22 ENCOUNTER — EXTERNAL HOME HEALTH (OUTPATIENT)
Dept: HOME HEALTH SERVICES | Facility: HOSPITAL | Age: 87
End: 2022-11-22
Payer: MEDICARE

## 2022-11-23 ENCOUNTER — DOCUMENT SCAN (OUTPATIENT)
Dept: HOME HEALTH SERVICES | Facility: HOSPITAL | Age: 87
End: 2022-11-23
Payer: MEDICARE

## 2022-11-23 ENCOUNTER — TELEPHONE (OUTPATIENT)
Dept: ORTHOPEDICS | Facility: CLINIC | Age: 87
End: 2022-11-23
Payer: MEDICARE

## 2022-11-23 ENCOUNTER — HOSPITAL ENCOUNTER (OUTPATIENT)
Dept: RADIOLOGY | Facility: HOSPITAL | Age: 87
Discharge: HOME | End: 2022-11-23
Attending: PHYSICIAN ASSISTANT
Payer: MEDICARE

## 2022-11-23 ENCOUNTER — OFFICE VISIT (OUTPATIENT)
Dept: ORTHOPEDICS | Facility: CLINIC | Age: 87
End: 2022-11-23
Payer: MEDICARE

## 2022-11-23 DIAGNOSIS — M25.552 BILATERAL HIP PAIN: ICD-10-CM

## 2022-11-23 DIAGNOSIS — M25.551 BILATERAL HIP PAIN: ICD-10-CM

## 2022-11-23 DIAGNOSIS — M70.62 GREATER TROCHANTERIC BURSITIS OF LEFT HIP: ICD-10-CM

## 2022-11-23 DIAGNOSIS — M76.32 IT BAND SYNDROME, LEFT: ICD-10-CM

## 2022-11-23 DIAGNOSIS — M25.551 BILATERAL HIP PAIN: Primary | ICD-10-CM

## 2022-11-23 DIAGNOSIS — M25.552 BILATERAL HIP PAIN: Primary | ICD-10-CM

## 2022-11-23 PROCEDURE — 99213 OFFICE O/P EST LOW 20 MIN: CPT | Mod: S$GLB,,, | Performed by: PHYSICIAN ASSISTANT

## 2022-11-23 PROCEDURE — 73521 X-RAY EXAM HIPS BI 2 VIEWS: CPT | Mod: TC

## 2022-11-23 PROCEDURE — 99999 PR PBB SHADOW E&M-EST. PATIENT-LVL III: CPT | Mod: PBBFAC,,, | Performed by: PHYSICIAN ASSISTANT

## 2022-11-23 PROCEDURE — 1159F MED LIST DOCD IN RCRD: CPT | Mod: CPTII,S$GLB,, | Performed by: PHYSICIAN ASSISTANT

## 2022-11-23 PROCEDURE — 99213 PR OFFICE/OUTPT VISIT, EST, LEVL III, 20-29 MIN: ICD-10-PCS | Mod: S$GLB,,, | Performed by: PHYSICIAN ASSISTANT

## 2022-11-23 PROCEDURE — 73521 XR HIPS BILATERAL 2 VIEW INCL AP PELVIS: ICD-10-PCS | Mod: 26,,, | Performed by: RADIOLOGY

## 2022-11-23 PROCEDURE — 99999 PR PBB SHADOW E&M-EST. PATIENT-LVL III: ICD-10-PCS | Mod: PBBFAC,,, | Performed by: PHYSICIAN ASSISTANT

## 2022-11-23 PROCEDURE — 73521 X-RAY EXAM HIPS BI 2 VIEWS: CPT | Mod: 26,,, | Performed by: RADIOLOGY

## 2022-11-23 PROCEDURE — 1159F PR MEDICATION LIST DOCUMENTED IN MEDICAL RECORD: ICD-10-PCS | Mod: CPTII,S$GLB,, | Performed by: PHYSICIAN ASSISTANT

## 2022-11-23 NOTE — PROGRESS NOTES
Subjective:      Patient ID: Jenniffer Jimenez is a 89 y.o. female.    Chief Complaint: Pain of the Left Hip and Pain of the Right Hip    HPI    Patient I a 89 year old female who presents to clinic with chief complaint of a-traumatic left lateral hip pain. Pain will radiate to lateral knee. She has had this before and got a steroid injection which helped. She is currently using a rollator to assist with ambulation. She is getting home health to assist with balance and with her cellulitis that is on her right leg. Denied groin pain.     Review of Systems   Constitutional: Negative for chills and fever.   Cardiovascular:  Negative for chest pain.   Respiratory:  Negative for cough and shortness of breath.    Skin:  Negative for color change, dry skin, itching, nail changes, poor wound healing and rash.   Musculoskeletal:         Left hip pain     Neurological:  Negative for dizziness.   Psychiatric/Behavioral:  Negative for altered mental status. The patient is not nervous/anxious.    All other systems reviewed and are negative.      Objective:            General    Constitutional: She is oriented to person, place, and time. She appears well-developed and well-nourished. No distress.   HENT:   Head: Atraumatic.   Eyes: Conjunctivae are normal.   Cardiovascular:  Normal rate.            Pulmonary/Chest: Effort normal.   Neurological: She is alert and oriented to person, place, and time.   Psychiatric: She has a normal mood and affect. Her behavior is normal.         Left Hip Exam     Crepitus   The patient has crepitus of the trochanteric bursa.    Range of Motion   The patient has normal left hip ROM.    Tests   Log Roll: negative    Comments:  TTP along It band           Muscle Strength   Left Lower Extremity   Hip Abduction: 4/5   Hip Adduction: 4/5   Hip Flexion: 4/5     RADS: Right: No fracture dislocation bone destruction seen.  There is DJD.     Left: No fracture dislocation bone destruction seen.  There is  DJD        Assessment:       Encounter Diagnoses   Name Primary?    Bilateral hip pain Yes    It band syndrome, left     Greater trochanteric bursitis of left hip           Plan:     Discussed with the patient that at this time I would not recommend a steroid injection due to her currently being treated for infection. She will ice and use PT. She is to to return to clinic as needed.

## 2022-11-25 ENCOUNTER — DOCUMENT SCAN (OUTPATIENT)
Dept: HOME HEALTH SERVICES | Facility: HOSPITAL | Age: 87
End: 2022-11-25
Payer: MEDICARE

## 2022-11-26 NOTE — ASSESSMENT & PLAN NOTE
- Creatine stable for now  - BMP reviewed- noted CrCl cannot be calculated (Patient's most recent lab result is older than the maximum 7 days allowed.). according to latest data.   - Monitor UOP and serial BMP and adjust therapy as needed  - Renally dose meds

## 2022-11-26 NOTE — ASSESSMENT & PLAN NOTE
Presence of Watchman left atrial appendage closure device  Patient with Permanent atrial fibrillation which is controlled currently. Patient is currently in atrial fibrillation. [id number] Score: 4. Patient is s/p Watchman.

## 2022-11-26 NOTE — ASSESSMENT & PLAN NOTE
Patient's anemia is currently controlled. S/p 0 units of PRBCs.   Current CBC reviewed-   Lab Results   Component Value Date    HGB 9.2 (L) 11/14/2022    HCT 27.6 (L) 11/14/2022     Monitor serial CBC and transfuse if patient becomes hemodynamically unstable, symptomatic or H/H drops below 7/21.

## 2022-11-26 NOTE — DISCHARGE SUMMARY
[facility] [address] [place] [address] Medicine  Discharge Summary      Patient Name: Jenniffer Jimenez  MRN: [id number]  NANCY: [id number]  Patient Class: OP- Observation  Admission Date: 11/12/2022  Hospital Length of Stay: 0 days  Discharge Date and Time: 11/14/2022  6:55 PM  Attending Physician: No att. providers found   Discharging Provider: Kelton Castillo PA-C  Primary Care Provider: Rubi Young [facility] Medicine Team: [facility] Kelton Castillo PA-C  Primary Care Team: [facility]    HPI:   Jenniffer Jimenez is a 89 y.o. female with PMHx of hypoglycemia, CKD, HFpEF, A fib, Meniere's disease, venous insufficiency, HTN, and HLD who presents to the ED due to increased drainage from chronic RLE wound. She was admitted from 11/8/22-11/10/22 for chronic RLE wound and was initially placed on abx, but ID did not feel the wound was infected and recommended discontinuing abx therapy. Unna boot was placed by vascular surgery with follow up in 1 week. She was told the dressing would last a week but she reports the wound is draining through the dressing. She reports soreness to the touch. She says the redness and swelling have improved but the draining has worsened. She has been elevating her leg at home. She is not currently on antibiotics. She denies any fever or chills. Home health previously came every few days but have not come since being discharged, as she was told the dressing would last a week. The patient denies any new numbness, weakness, and affirms improvement of pain in the last week with much improvement in edema of extremity. The patient is concerned due to the drainage continuing to soak through her dressing and feels she cannot manage this at home herself.    In the ED, VSSAF. CBC unremarkable. Cr 1.2 (at baseline). Sed rate 64. CRP 8.8. Lactate WNL. Patient lives alone and does have HH that is supposed to provide wound care but have not been to readmit the patient since discharge.  "Patient placed in observation for case management consult and possible short term placement for wound care.      * No surgery found *      Hospital Course:   Jenniffer Jimenez was admitted to Observation for RLE wound care and case management consult. Patient reports concern for continued wound drainage and slow healing. Discussed outpatient wound care possibilities with CM. Patient complaining of urinary frequency, denied dysuria or hematuria. UA with 3+ Leuks and >100 WBCs. Gave Augmentin 500 mg BID x 7 days. Patient reported an episode of vision changes, confusion, and BLE weakness. Nurse was called and assessed patient at bedside. Patient was hysterical, stating she was scared and unsure of what happened. She reported "the bed was standing up, and I was going to fall over". However, was still in bed. No fall or injury occurred. Bladder/bowel continence was maintained. Patient was examined by me at bedside. Cranial nerve exam wnl. BUE strength 5/5 throughout. Patient unable to perform straight leg raise and knee flexion 2/2 to weakness. Plantar- and dorsiflexion with 5/5 strength. Sensation intact throughout BLE. However, patient endorsed numbness, but denied pain. At this time of my exam, she became very distressed and began crying. Patient was eventually consolable and then proceeded to use her walker with assistance to walk to the bathroom. A CT head was performed to rule out etiology, no evidence of acute hemorrhage or major vascular distribution infarct observed. Patient at baseline without deficits or complaints other than concern for wound healing prior to discharge. Wound Care performed dressing change, recommending changing dressing every 4 days. Patient has home health, care at home, and wound care orders placed.       Goals of Care Treatment Preferences:  Code Status: Full Code      Consults:   Consults (From admission, onward)        Status Ordering Provider     Inpatient consult to PICC team " (NIAS)  Once        Provider:  (Not yet assigned)    Completed DIANNE DORSEY     Inpatient consult to Social Work  Once        Provider:  (Not yet assigned)    Completed REJI VELIZ          * Non-healing wound of right lower extremity  Follows with wound care and ID outpatient. Patient with concerns of being discharged home. Feels she does not receive appropriate wound care outpatient and would prefer to be admitted or go to SNF.  - SIRS 0/4  - Lactate 1.0  - PRN tylenol for pain  - Wound Care consulted  - Follow up with Vascular Surgery next week    Unable to care for self  - Patient lives alone and is unable to change her RLE dressing independently  - Case management consulted for possible placement vs close wound care follow-up with provided transportation    Diabetic polyneuropathy  Patient's FSGs are controlled on current medication regimen.  Last A1c reviewed-   Lab Results   Component Value Date    HGBA1C 6.0 (H) 09/20/2022     Most recent fingerstick glucose reviewed-   No results for input(s): POCTGLUCOSE in the last 24 hours.  Current correctional scale  Low  Maintain anti-hyperglycemic dose as follows-   Antihyperglycemics (From admission, onward)    None        Hold Oral hypoglycemics while patient is in the hospital.  -Accuchecks AC/HS    Normocytic anemia  Patient's anemia is currently controlled. S/p 0 units of PRBCs.   Current CBC reviewed-   Lab Results   Component Value Date    HGB 9.2 (L) 11/14/2022    HCT 27.6 (L) 11/14/2022     Monitor serial CBC and transfuse if patient becomes hemodynamically unstable, symptomatic or H/H drops below 7/21.     Venous insufficiency of both lower extremities  - Continue ASA and statin  - Keep lower extremities elevated  - Has outpatient vascular follow up next week    History of TIA (transient ischemic attack)  - Continue ASA and statin    Chronic diastolic heart failure  Patient is identified as having Diastolic (HFpEF) heart failure that is Chronic.  CHF is currently controlled. Latest ECHO performed and demonstrates- Results for orders placed during the hospital encounter of 10/07/22    Echo    Interpretation Summary  · The left ventricle is normal in size with concentric remodeling and normal systolic function.  · The estimated ejection fraction is 55-60%.  · Grade III left ventricular diastolic dysfunction.  · Mild mitral regurgitation.  · Normal right ventricular size with mildly reduced right ventricular systolic function.  · Severe tricuspid regurgitation.  · The estimated PA systolic pressure is 52 mmHg. PASP may be under-estimated due to TR severity.  · Elevated central venous pressure (15 mmHg).  · There is pulmonary hypertension.  · Severe left atrial enlargement.  - Continue Furosemide, Aldactone, and monitor clinical status closely  - Monitor on telemetry  - Patient is off CHF pathway.   - Monitor strict Is&Os and daily weights.  - Place on fluid restriction of 1.5 L.   - Continue to stress to patient importance of self efficacy and  on diet for CHF.   - Appears euvolemic on exam.    Stage 3b chronic kidney disease  - Creatine stable for now  - BMP reviewed- noted CrCl cannot be calculated (Patient's most recent lab result is older than the maximum 7 days allowed.). according to latest data.   - Monitor UOP and serial BMP and adjust therapy as needed  - Renally dose meds    Chronic atrial fibrillation  Presence of Watchman left atrial appendage closure device  Patient with Permanent atrial fibrillation which is controlled currently. Patient is currently in atrial fibrillation. [id number] Score: 4. Patient is s/p Watchman.    Meniere's disease  - Chronic, stable  - Functional status at baseline    Primary hypertension  - controlled   - continue home Lasix and aldactone  - Low Na diet    Pure hypercholesterolemia   Patient is chronically on statin.will continue for now. Monitor clinically. Last LDL was   Lab Results   Component Value Date     LDLCALC 105.8 09/20/2022         Final Active Diagnoses:    Diagnosis Date Noted POA    PRINCIPAL PROBLEM:  Non-healing wound of right lower extremity [S81.801A] 11/08/2022 Yes    Unable to care for self [Z78.9] 11/12/2022 Yes    Diabetic polyneuropathy [E11.42] 01/06/2022 Yes    Normocytic anemia [D64.9] 12/17/2020 Yes    Presence of Watchman left atrial appendage closure device [Z95.818] 12/15/2020 Yes     Chronic    Venous insufficiency of both lower extremities [I87.2] 11/22/2019 Yes    Stage 3b chronic kidney disease [N18.32] 05/04/2016 Yes    Chronic diastolic heart failure [I50.32] 05/04/2016 Yes     Chronic    History of TIA (transient ischemic attack) [Z86.73] 05/04/2016 Not Applicable    Chronic atrial fibrillation [I48.20] 01/29/2016 Yes     Chronic    Meniere's disease [H81.09] 04/02/2014 Yes    Primary hypertension [I10]  Yes     Chronic    Pure hypercholesterolemia [E78.00] 07/02/2012 Yes     Chronic      Problems Resolved During this Admission:       Discharged Condition: good    Disposition: Home or Self Care    Follow Up:   Follow-up Information     Rubi Young DO Follow up in 1 month(s).    Specialty: Internal Medicine  Contact information:  [address] [facility] [address]  [phone]                       Patient Instructions:      Ambulatory referral/consult to [facility]   Standing Status: Future   Referral Priority: Routine Referral Type: Consultation   Referral Reason: Specialty Services Required   Number of Visits Requested: 1     Diet Cardiac     Notify your health care provider if you experience any of the following:  temperature >100.4     Notify your health care provider if you experience any of the following:  severe uncontrolled pain     Notify your health care provider if you experience any of the following:  persistent nausea and vomiting or diarrhea     Notify your health care provider if you experience any of the  following:  redness, tenderness, or signs of infection (pain, swelling, redness, odor or green/yellow discharge around incision site)     Notify your health care provider if you experience any of the following:  difficulty breathing or increased cough     Notify your health care provider if you experience any of the following:  persistent dizziness, light-headedness, or visual disturbances     Notify your health care provider if you experience any of the following:  severe persistent headache     Notify your health care provider if you experience any of the following:  increased confusion or weakness     Leave dressing on - Keep it clean, dry, and intact until clinic visit     Activity as tolerated       Pending Diagnostic Studies:     None         Medications:  Reconciled Home Medications:      Medication List      CONTINUE taking these medications    acetaminophen 500 MG tablet  Commonly known as: TYLENOL  Take 1,000 mg by mouth 2 (two) times a day.     aspirin 81 MG EC tablet  Commonly known as: ECOTRIN  Take 1 tablet (81 mg total) by mouth once daily.     azelastine 137 mcg (0.1 %) nasal spray  Commonly known as: ASTELIN  1 spray (137 mcg total) by Nasal route 2 (two) times daily.     CRITIC-AID CLEAR AF(MICONAZOL) 2 % Oint  Generic drug: miconazole nitrate 2%  Apply topically every other day.     fluticasone propionate 50 mcg/actuation nasal spray  Commonly known as: FLONASE  USE 1 SPRAY IN EACH NOSTRIL ONE TIME DAILY     furosemide 20 MG tablet  Commonly known as: LASIX  Take 1 tablet (20 mg total) by mouth 2 (two) times daily before meals. Take twice a day for Weight greater than 160 lb, once a day for weights less than 160 lb     guaiFENesin 100 mg/5 ml 100 mg/5 mL syrup  Commonly known as: ROBITUSSIN  Take 15 mLs by mouth once daily.     mupirocin 2 % ointment  Commonly known as: BACTROBAN  Apply topically 3 (three) times daily. Apply locally every other day     OCUVITE LUTEIN AND ZEAXANTHIN ORAL  Take 1  capsule by mouth once daily. Lutien 25 mg, Zeaxanthin 5 mg     pravastatin 40 MG tablet  Commonly known as: PRAVACHOL  Take 1 tablet (40 mg total) by mouth once daily.     spironolactone 25 MG tablet  Commonly known as: ALDACTONE  Take twice a day for Weight greater than 160 lb, once a day for weights less than 160 lb     vitamin E 400 UNIT capsule  Take 400 Units by mouth once daily.        ASK your doctor about these medications    methocarbamoL 500 MG Tab  Commonly known as: ROBAXIN  Take 1 tablet (500 mg total) by mouth 2 (two) times daily as needed (muscle cramps).  Ask about: Should I take this medication?            Indwelling Lines/Drains at time of discharge:   Lines/Drains/Airways     None                 Time spent on the discharge of patient: 36 minutes         Kelton Castillo PA-C  Department of Hospital Medicine  [facility] 11H

## 2022-11-28 ENCOUNTER — OFFICE VISIT (OUTPATIENT)
Dept: WOUND CARE | Facility: CLINIC | Age: 87
End: 2022-11-28
Payer: MEDICARE

## 2022-11-28 ENCOUNTER — TELEPHONE (OUTPATIENT)
Dept: WOUND CARE | Facility: CLINIC | Age: 87
End: 2022-11-28
Payer: MEDICARE

## 2022-11-28 VITALS
TEMPERATURE: 99 F | DIASTOLIC BLOOD PRESSURE: 63 MMHG | BODY MASS INDEX: 27.36 KG/M2 | WEIGHT: 164.25 LBS | HEART RATE: 76 BPM | HEIGHT: 65 IN | SYSTOLIC BLOOD PRESSURE: 132 MMHG

## 2022-11-28 DIAGNOSIS — N18.32 STAGE 3B CHRONIC KIDNEY DISEASE: ICD-10-CM

## 2022-11-28 DIAGNOSIS — L03.115 CELLULITIS OF RIGHT LOWER EXTREMITY: ICD-10-CM

## 2022-11-28 DIAGNOSIS — Z86.73 HISTORY OF TIA (TRANSIENT ISCHEMIC ATTACK): ICD-10-CM

## 2022-11-28 DIAGNOSIS — S81.801A NON-HEALING WOUND OF RIGHT LOWER EXTREMITY: Primary | ICD-10-CM

## 2022-11-28 DIAGNOSIS — I50.32 CHRONIC DIASTOLIC HEART FAILURE: Chronic | ICD-10-CM

## 2022-11-28 DIAGNOSIS — I10 PRIMARY HYPERTENSION: Chronic | ICD-10-CM

## 2022-11-28 DIAGNOSIS — E66.3 OVERWEIGHT (BMI 25.0-29.9): ICD-10-CM

## 2022-11-28 DIAGNOSIS — E13.42 DIABETIC POLYNEUROPATHY ASSOCIATED WITH OTHER SPECIFIED DIABETES MELLITUS: ICD-10-CM

## 2022-11-28 DIAGNOSIS — I87.2 VENOUS INSUFFICIENCY OF BOTH LOWER EXTREMITIES: ICD-10-CM

## 2022-11-28 PROCEDURE — 11042 PR DEBRIDEMENT, SKIN, SUB-Q TISSUE,=<20 SQ CM: ICD-10-PCS | Mod: S$GLB,,,

## 2022-11-28 PROCEDURE — 11045 DBRDMT SUBQ TISS EACH ADDL: CPT | Mod: S$GLB,,,

## 2022-11-28 PROCEDURE — 1125F AMNT PAIN NOTED PAIN PRSNT: CPT | Mod: CPTII,S$GLB,,

## 2022-11-28 PROCEDURE — 99215 OFFICE O/P EST HI 40 MIN: CPT | Mod: 25,S$GLB,,

## 2022-11-28 PROCEDURE — 1159F PR MEDICATION LIST DOCUMENTED IN MEDICAL RECORD: ICD-10-PCS | Mod: CPTII,S$GLB,,

## 2022-11-28 PROCEDURE — 11042 DBRDMT SUBQ TIS 1ST 20SQCM/<: CPT | Mod: S$GLB,,,

## 2022-11-28 PROCEDURE — 99999 PR PBB SHADOW E&M-EST. PATIENT-LVL IV: CPT | Mod: PBBFAC,,,

## 2022-11-28 PROCEDURE — 3288F FALL RISK ASSESSMENT DOCD: CPT | Mod: CPTII,S$GLB,,

## 2022-11-28 PROCEDURE — 1101F PR PT FALLS ASSESS DOC 0-1 FALLS W/OUT INJ PAST YR: ICD-10-PCS | Mod: CPTII,S$GLB,,

## 2022-11-28 PROCEDURE — 1101F PT FALLS ASSESS-DOCD LE1/YR: CPT | Mod: CPTII,S$GLB,,

## 2022-11-28 PROCEDURE — 1159F MED LIST DOCD IN RCRD: CPT | Mod: CPTII,S$GLB,,

## 2022-11-28 PROCEDURE — 99215 PR OFFICE/OUTPT VISIT, EST, LEVL V, 40-54 MIN: ICD-10-PCS | Mod: 25,S$GLB,,

## 2022-11-28 PROCEDURE — 11045 DEBRIDEMENT: ICD-10-PCS | Mod: S$GLB,,,

## 2022-11-28 PROCEDURE — 99999 PR PBB SHADOW E&M-EST. PATIENT-LVL IV: ICD-10-PCS | Mod: PBBFAC,,,

## 2022-11-28 PROCEDURE — 3288F PR FALLS RISK ASSESSMENT DOCUMENTED: ICD-10-PCS | Mod: CPTII,S$GLB,,

## 2022-11-28 PROCEDURE — 1125F PR PAIN SEVERITY QUANTIFIED, PAIN PRESENT: ICD-10-PCS | Mod: CPTII,S$GLB,,

## 2022-11-28 RX ORDER — AMOXICILLIN AND CLAVULANATE POTASSIUM 875; 125 MG/1; MG/1
1 TABLET, FILM COATED ORAL 2 TIMES DAILY
Qty: 14 TABLET | Refills: 0 | Status: SHIPPED | OUTPATIENT
Start: 2022-11-28 | End: 2022-12-05

## 2022-11-28 NOTE — TELEPHONE ENCOUNTER
Patient being officially seen today in wound care clinic and provider will fax over wound care orders to fax number listed in message.

## 2022-11-28 NOTE — PROGRESS NOTES
Subjective:       Patient ID: Jenniffer Jimenez is a 89 y.o. female.    Chief Complaint: Wound Consult    Patient presents with daughter for an evaluation of a right lower extremity wound. Pt reports that she had trauma to her right knee about 3 months ago. Pt reports the wound progressively got worse. Endorses increased erythema and edema to the area. She has been followed at the wound care clinic in [place], vascular surgery, and primary care. Patient was evaluated by the ED 4 times for this. Pt has [facility] home [facility] that comes out 2x a week. She states she has excessive clear drainage. Denies fever, chills, erythema, warmth, purulent drainage, or pain.    ABIs 11/90/22:  Right HOLLY:1.17  Left HOLLY: 1.14    Review of Systems   Constitutional:  Negative for activity change, chills, diaphoresis, fatigue and fever.   Respiratory:  Negative for apnea, chest tightness and shortness of breath.    Cardiovascular:  Positive for leg swelling. Negative for chest pain and palpitations.   Musculoskeletal:  Negative for gait problem and joint swelling.   Skin:  Positive for color change and wound. Negative for pallor and rash.   Neurological:  Negative for syncope, weakness and numbness.   Psychiatric/Behavioral:  Negative for agitation. The patient is not nervous/anxious.    All other systems reviewed and are negative.    Objective:      Physical Exam  Vitals reviewed.   Constitutional:       General: She is not in acute distress.     Appearance: Normal appearance.   Cardiovascular:      Rate and Rhythm: Normal rate and regular rhythm.      Pulses: Normal pulses.   Pulmonary:      Effort: No respiratory distress.   Musculoskeletal:         General: No swelling.      Right lower leg: Edema present.   Skin:     General: Skin is warm and dry.      Capillary Refill: Capillary refill takes less than 2 seconds.      Findings: Erythema and wound present.          Neurological:      General: No focal deficit present.      Mental  Status: She is alert and oriented to person, place, and time.   Psychiatric:         Mood and Affect: Mood normal.         Behavior: Behavior normal.         Thought Content: Thought content normal.         Judgment: Judgment normal.       Assessment:       1. Non-healing wound of right lower extremity    2. Cellulitis of right lower extremity    3. Venous insufficiency of both lower extremities    4. History of TIA (transient ischemic attack)    5. Diabetic polyneuropathy associated with other specified diabetes mellitus    6. Primary hypertension    7. Chronic diastolic heart failure    8. Stage 3b chronic kidney disease    9. Overweight (BMI 25.0-29.9)           Altered Skin Integrity 09/25/22 2010 Right anterior;lower Leg #1 (Active)   09/25/22 2010   Altered Skin Integrity Present on Admission: yes   Side: Right   Orientation: anterior;lower   Location: Leg   Wound Number: #1   Is this injury device related?:    Primary Wound Type:    Description of Altered Skin Integrity:    Ankle-Brachial Index:    Pulses:    Removal Indication and Assessment:    Wound Outcome:    (Retired) Wound Length (cm):    (Retired) Wound Width (cm):    (Retired) Depth (cm):    Wound Description (Comments):    Removal Indications:    Wound Image      11/28/22 1505   Dressing Appearance Dry;Intact;Clean;Saturated;Moist drainage 11/28/22 1505   Drainage Amount Copious 11/28/22 1505   Drainage Characteristics/Odor Yellow;Clear 11/28/22 1505   Red (%), Wound Tissue Color 50 % 11/28/22 1505   Yellow (%), Wound Tissue Color 50 % 11/28/22 1505   Periwound Area Intact;Edematous;[place] 11/28/22 1505   Wound Length (cm) 22 cm 11/28/22 1505   Wound Width (cm) 20 cm 11/28/22 1505   Wound Depth (cm) 0.1 cm 11/28/22 1505   Wound Volume (cm^3) 44 cm^3 11/28/22 1505   Wound Surface Area (cm^2) 440 cm^2 11/28/22 1505   Care Cleansed with:;Soap and water 11/28/22 1505   Dressing Applied;Calcium alginate;Silver;Absorptive Pad;Compression wrap;Other  (comment) 11/28/22 1505   Periwound Care Skin barrier film applied 11/28/22 1505   Compression Two layer compression 11/28/22 1505      Jenniffer was seen in the clinic room and placed in the supine position on the treatment table.  The unna boot was removed with scissors and the leg was cleansed with Easi-clense sponges and dried thoroughly. Erythema noted. No odor or warmth noted from patient's baseline. Placed topical 4% Lidocaine Hydrochloride to wound bed for 15 minutes. Sharp debridement with scissors and pickups to remove non-viable tissue. Pt tolerated procedure well.    Plan of Care: Aquacel Ag to wound bed, calmoseptine to periwound, drawtex, mextra pads, and covered with a compression wrap (kerlix and coban). The patient's foot was positioned at a 90 degree angle.  A compression wrap was applied using a spiral technique avoiding creases or folds.  The wrap was started behind the first metatarsal and ended below the tibial tubercle of the knee.  There was overlap of each turn half the width of the previous turn.    Plan:       Orders Placed This Encounter   Procedures    Debridement     This order was created via procedure documentation       Right lower extremity dressed as detailed above.  Patient was instructed to not get the dressings wet and to use cast covers for showering.  Should the dressing become wet, she is to remove it, place a moist dressing over the wound, cover with gauze and roll gauze and use ace wraps for compression and to secure bandages.  She should then notify this office or home health as soon as possible to have a new dressing applied.  Instructed patient to remove wrap if she notices tingling or coldness to her right lower extremity or if her toes become white, blue, or cold.    Faxed home health care orders to [facility]:  HOME HEALTH WOUND CARE ORDERS  D/C previous orders  Wound care and nurse visits: 2X a week and PRN complications  1. Cleanse wound with saline, mild soap and water, or  wound cleanser  2.Apply aquacel Ag to wound bed, drawtex, mextra pads, and a compression wrap (rolled gauze and coban).  3.Cover with appropriate cover dressing  Monitor for infection and teach patient/caregiver signs and symptoms    Discussed nutrition and the role of protein in wound healing with the patient. Instructed patient to optimize protein for wound healing within CKD protein limitations. Gave samples of Ensure.     Instructed patient to elevate legs whenever sedentary, to follow a low salt diet, and take lasix as prescribed.    Discussed cellulitis with patient. Prescribed Augmentin. Discussed side effects of medication. Instructed patient to take medication as prescribed.     All questions were answered.    Written and verbal instructions given to patient  RTC in 1 week      Clara Arteaga PA-C               50  minutes of total time spent on the encounter, which includes face to face time and non-face to face time preparing to see the patient (eg, review of tests), Obtaining and/or reviewing separately obtained history, Documenting clinical information in the electronic or other health record, Independently interpreting results (not separately reported) and communicating results to the patient/family/caregiver, or Care coordination (not separately reported).

## 2022-11-28 NOTE — TELEPHONE ENCOUNTER
----- Message from Nydia Espinoza sent at 11/25/2022  2:50 PM CST -----  Regarding: Call Back  Contact: [phone]  Afshan ( Daughter) Is needing a call back. She states home health has not changed the bandage and puts the same one on. She states home health has not received the new orders. Bandage leaks two days. Please call to discuss further @[phone]. Home health fax # [phone]

## 2022-11-28 NOTE — PROCEDURES
"Debridement    Date/Time: 11/28/2022 2:00 PM  Performed by: Clara Arteaga PA-C  Authorized by: Clara Arteaga PA-C     Time out: Immediately prior to procedure a "time out" was called to verify the correct patient, procedure, equipment, support staff and site/side marked as required.    Consent Done?:  Yes (Written)  Local anesthesia used?: Yes    Local anesthetic:  Topical anesthetic (Topical 4% Lidocaine hydrochloride)    Wound Details:    Location:  Right leg    Type of Debridement:  Excisional       Length (cm):  22       Area (sq cm):  440       Width (cm):  20       Percent Debrided (%):  50       Depth (cm):  0.1       Total Area Debrided (sq cm):  220    Depth of debridement:  Subcutaneous tissue    Tissue debrided:  Subcutaneous    Devitalized tissue debrided:  Biofilm, Exudate, Fibrin and Slough    Instruments:  Curette    Bleeding:  Minimal  Hemostasis Achieved: Yes    Method Used:  Pressure  Patient tolerance:  Patient tolerated the procedure well with no immediate complications  "

## 2022-11-30 ENCOUNTER — OUTPATIENT CASE MANAGEMENT (OUTPATIENT)
Dept: ADMINISTRATIVE | Facility: OTHER | Age: 87
End: 2022-11-30
Payer: MEDICARE

## 2022-11-30 ENCOUNTER — TELEPHONE (OUTPATIENT)
Dept: WOUND CARE | Facility: CLINIC | Age: 87
End: 2022-11-30
Payer: MEDICARE

## 2022-11-30 NOTE — PROGRESS NOTES
CM received phone call from patient inquiring about a cast cover for her leg to be able to take a shower since she has a bandage on her right leg from below her knee to her toes.  CM called [employer] to inquire about a cast cover.  They states they have one left in stock that is called Aqua [employer]or and costs $28 that insurance will not cover.  They do not ship, someone must go pick it up from the store on [facility], [place].  CM then called patient back with information and with a Google search CM was able to find a Long Leg Case Cover from [employer] online at [employer].  CM assisted patient by placing order for the cover, order number [id number], from [employer] [phone].

## 2022-11-30 NOTE — TELEPHONE ENCOUNTER
----- Message from Marianne Vyas RN sent at 11/29/2022  4:22 PM CST -----  Regarding: FW: orders  Contact: [phone]  I think this message is for y'all.  ----- Message -----  From: Abby Joya  Sent: 11/29/2022  11:58 AM CST  To: Jose LASSITER Staff  Subject: orders                                           CallerAfshan states that PT orders for Home Health has not been fax over. Caller states Home Health is currently at Pt home placing wrong bandage on Pt. PT states correct order are needing to be fax for proper healing care. Caller states no need to call back,just place correct orders. Please call discuss further.

## 2022-11-30 NOTE — TELEPHONE ENCOUNTER
Returned call, no answer, left voice message advising daughter that wound care orders were faxed to home health agency on Monday, 11/28/22.

## 2022-12-05 ENCOUNTER — DOCUMENT SCAN (OUTPATIENT)
Dept: HOME HEALTH SERVICES | Facility: HOSPITAL | Age: 87
End: 2022-12-05
Payer: MEDICARE

## 2022-12-06 ENCOUNTER — OFFICE VISIT (OUTPATIENT)
Dept: WOUND CARE | Facility: CLINIC | Age: 87
End: 2022-12-06
Payer: MEDICARE

## 2022-12-06 ENCOUNTER — OUTPATIENT CASE MANAGEMENT (OUTPATIENT)
Dept: ADMINISTRATIVE | Facility: OTHER | Age: 87
End: 2022-12-06
Payer: MEDICARE

## 2022-12-06 ENCOUNTER — DOCUMENT SCAN (OUTPATIENT)
Dept: HOME HEALTH SERVICES | Facility: HOSPITAL | Age: 87
End: 2022-12-06
Payer: MEDICARE

## 2022-12-06 ENCOUNTER — PATIENT MESSAGE (OUTPATIENT)
Dept: ADMINISTRATIVE | Facility: OTHER | Age: 87
End: 2022-12-06
Payer: MEDICARE

## 2022-12-06 DIAGNOSIS — S81.801A NON-HEALING WOUND OF RIGHT LOWER EXTREMITY: Primary | ICD-10-CM

## 2022-12-06 DIAGNOSIS — I87.2 VENOUS INSUFFICIENCY OF BOTH LOWER EXTREMITIES: ICD-10-CM

## 2022-12-06 DIAGNOSIS — Z86.73 HISTORY OF TIA (TRANSIENT ISCHEMIC ATTACK): ICD-10-CM

## 2022-12-06 DIAGNOSIS — E66.3 OVERWEIGHT (BMI 25.0-29.9): ICD-10-CM

## 2022-12-06 DIAGNOSIS — I10 PRIMARY HYPERTENSION: ICD-10-CM

## 2022-12-06 DIAGNOSIS — N18.32 STAGE 3B CHRONIC KIDNEY DISEASE: ICD-10-CM

## 2022-12-06 DIAGNOSIS — I50.32 CHRONIC DIASTOLIC HEART FAILURE: ICD-10-CM

## 2022-12-06 DIAGNOSIS — E13.42 DIABETIC POLYNEUROPATHY ASSOCIATED WITH OTHER SPECIFIED DIABETES MELLITUS: ICD-10-CM

## 2022-12-06 DIAGNOSIS — L03.115 CELLULITIS OF RIGHT LOWER EXTREMITY: ICD-10-CM

## 2022-12-06 PROCEDURE — 29581 APPL MULTLAYER CMPRN SYS LEG: CPT | Mod: RT,S$GLB,,

## 2022-12-06 PROCEDURE — 29581 PR APPL MLT-LAYER VENOUS WOUND COMPRESS BELOW KNEE: ICD-10-PCS | Mod: RT,S$GLB,,

## 2022-12-06 PROCEDURE — 99214 PR OFFICE/OUTPT VISIT, EST, LEVL IV, 30-39 MIN: ICD-10-PCS | Mod: 25,S$GLB,,

## 2022-12-06 PROCEDURE — 99214 OFFICE O/P EST MOD 30 MIN: CPT | Mod: 25,S$GLB,,

## 2022-12-06 PROCEDURE — 99999 PR PBB SHADOW E&M-EST. PATIENT-LVL II: CPT | Mod: PBBFAC,,,

## 2022-12-06 PROCEDURE — 99999 PR PBB SHADOW E&M-EST. PATIENT-LVL II: ICD-10-PCS | Mod: PBBFAC,,,

## 2022-12-06 RX ORDER — CIPROFLOXACIN 500 MG/1
500 TABLET ORAL EVERY 12 HOURS
Qty: 20 TABLET | Refills: 0 | Status: SHIPPED | OUTPATIENT
Start: 2022-12-06 | End: 2022-12-16

## 2022-12-06 NOTE — PROGRESS NOTES
Subjective:       Patient ID: Jenniffer Jimenez is a 89 y.o. female.    Chief Complaint: Wound Check and Wound Infection    Patient presents with daughter for a reevaluation of a right lower extremity wound. Pt has [facility] home health that comes out 2x a week. Patient reports that Home health is not wrapping her leg correctly. She reports that she completed her Augmentin. Denies fever, chills, erythema, warmth, or pain.    ABIs 11/90/22:  Right HOLLY:1.17  Left HOLLY: 1.14    Review of Systems   Constitutional:  Negative for activity change, chills, diaphoresis, fatigue and fever.   Respiratory:  Negative for apnea, chest tightness and shortness of breath.    Cardiovascular:  Positive for leg swelling. Negative for chest pain and palpitations.   Musculoskeletal:  Negative for gait problem and joint swelling.   Skin:  Positive for wound. Negative for color change, pallor and rash.   Neurological:  Negative for syncope, weakness and numbness.   Psychiatric/Behavioral:  Negative for agitation. The patient is not nervous/anxious.    All other systems reviewed and are negative.    Objective:      Physical Exam  Vitals reviewed.   Constitutional:       General: She is not in acute distress.     Appearance: Normal appearance.   Cardiovascular:      Rate and Rhythm: Normal rate and regular rhythm.      Pulses: Normal pulses.   Pulmonary:      Effort: No respiratory distress.   Musculoskeletal:         General: No swelling.      Right lower leg: Edema present.   Skin:     General: Skin is warm and dry.      Capillary Refill: Capillary refill takes less than 2 seconds.      Findings: Wound present. No erythema.          Neurological:      General: No focal deficit present.      Mental Status: She is alert and oriented to person, place, and time.   Psychiatric:         Mood and Affect: Mood normal.         Behavior: Behavior normal.         Thought Content: Thought content normal.         Judgment: Judgment normal.        Assessment:       1. Non-healing wound of right lower extremity    2. Cellulitis of right lower extremity    3. Venous insufficiency of both lower extremities    4. History of TIA (transient ischemic attack)    5. Diabetic polyneuropathy associated with other specified diabetes mellitus    6. Primary hypertension    7. Chronic diastolic heart failure    8. Stage 3b chronic kidney disease    9. Overweight (BMI 25.0-29.9)           Altered Skin Integrity 09/25/22 2010 Right anterior;lower Leg #1 (Active)   09/25/22 2010   Altered Skin Integrity Present on Admission: yes   Side: Right   Orientation: anterior;lower   Location: Leg   Wound Number: #1   Is this injury device related?:    Primary Wound Type:    Description of Altered Skin Integrity:    Ankle-Brachial Index:    Pulses:    Removal Indication and Assessment:    Wound Outcome:    (Retired) Wound Length (cm):    (Retired) Wound Width (cm):    (Retired) Depth (cm):    Wound Description (Comments):    Removal Indications:    Wound Image      12/06/22 [facility]   Dressing Appearance Dry;Intact;Clean;Moist drainage 12/06/22 1024   Drainage Amount Large 12/06/22 1024   Drainage Characteristics/Odor Green 12/06/22 1024   Red (%), Wound Tissue Color 100 % 12/06/22 1024   Periwound Area Intact;Edematous;Pink 12/06/22 1024   Wound Length (cm) 13.9 cm 12/06/22 1024   Wound Width (cm) 25.1 cm 12/06/22 1024   Wound Depth (cm) 0.1 cm 12/06/22 1024   Wound Volume (cm^3) 34.889 cm^3 12/06/22 1024   Wound Surface Area (cm^2) 348.89 cm^2 12/06/22 1024   Care Cleansed with:;Soap and water 12/06/22 1024   Dressing Applied;Calcium alginate;Silver;Absorptive Pad;Rolled gauze;Elastic bandage;Other (comment) 12/06/22 1024   Periwound Care Skin barrier film applied 12/06/22 1024      Jenniffer was seen in the clinic room and placed in the supine position on the treatment table.  The wound dressing was removed with scissors and the leg was cleansed with Easi-clense sponges and dried  thoroughly. No odor, erythema, or warmth noted from patient's baseline. Green drainage noted.    Plan of Care: Aquacel Ag to wound bed, calmoseptine to periwound, drawtex, mextra pads, and covered with a wrap (kerlix and coban). The patient's foot was positioned at a 90 degree angle.  The wrap was applied using a spiral technique avoiding creases or folds.  The wrap was started behind the first metatarsal and ended below the tibial tubercle of the knee.  There was overlap of each turn half the width of the previous turn.    Plan:       Right lower extremity dressed as detailed above.  Patient was instructed to not get the dressings wet and to use cast covers for showering.  Should the dressing become wet, she is to remove it, place a moist dressing over the wound, cover with gauze and roll gauze and use ace wraps for compression and to secure bandages.  She should then notify this office or home health as soon as possible to have a new dressing applied.  Instructed patient to remove wrap if she notices tingling or coldness to her right lower extremity or if her toes become white, blue, or cold.    Faxed home health care orders to [facility]:  HOME HEALTH WOUND CARE ORDERS  D/C previous orders  Wound care and nurse visits: 3X a week and PRN complications  1. Cleanse wound with saline, mild soap and water, or wound cleanser  2.Apply aquacel Ag to wound bed, drawtex, mextra pads, and wrap leg in rolled gauze and coban from below her first metatarsal and ending below the tibial tubercle of the knee  3.Cover with appropriate cover dressing  Monitor for infection and teach patient/caregiver signs and symptoms    Discussed nutrition and the role of protein in wound healing with the patient. Instructed patient to continue to optimize protein for wound healing within CKD protein limitations.     Instructed patient to elevate legs whenever sedentary, to follow a low salt diet, and take lasix as prescribed.    Discussed  cellulitis with patient-likely pseudomonas. Prescribed ciprofloxacin. Discussed side effects of medication. Instructed patient to take medication as prescribed.     All questions were answered.  Written and verbal instructions given to patient  RTC in 2 week      Clara Arteaga PA-C

## 2022-12-06 NOTE — PROGRESS NOTES
Outpatient Care Management  Plan of Care Follow Up Visit    Patient: Jenniffer Jimenez  MRN: [id number]  Date of Service: 12/06/2022  Completed by: Lima Washington RN  Referral Date: 10/10/2022    Reason for Visit   Patient presents with    OPCM Chart Review     12/6/2022    OPCM RN First Follow-Up Attempt     12/6/2022  No answer, CM left voicemail message and sent follow up attempt letter through the portal.    Follow-up     12/8/2022    Update Plan Of Care     12/8/2022       Brief Summary: Patient states she is not having shortness of breath, a cough, leg swelling or fast or irregular heartbeat.  She states she knows that shortness of breath is a sign and symptom of CHF but doesn't remember the rest.  She weighs 160 lbs today as she has for the last three days.  She states she hasn't gained any weight.  Patient states she is eating a high protein diet for wound healing and drinking her 1700 ml fluid restriction but not eating a lot of sodium.  She doesn't not have salt in her home and reads the label for sodium content before consuming.  She drinks two, high protein boosts a day.  She get low sodium meals from meals on wheels.  Patient states her leg looks a bit better.  She states there is new skin growing and it's less red.  She states there is not as much drainage because it is not soaking through the dressing anymore.  She is keeping her leg elevated on a foam pillow as much as possible.  She gets up to go to the bathroom then props it back up.  She received her education materials and read through it.  She is taking her antibiotics as prescribed by the MD.    CM reiterated to patient the signs and symptoms of CHF including SOB; new or worsening cough especially if your cough is bloody or frothy; worsened swelling in your legs or ankles; fast or irregular heartbeat.  CM informed patient to call PCP/cardiologist if she has any of the above signs and symptoms or if she gains 3 lbs in one day or 5 lbs in one  week.  CM reiterated the importance of following a low sodium diet and fluid restriction.  CM also discussed the importance of a high protein diet at this time for wound healing of her right leg.  CM reviewed the self care steps including maintain nutrition and hydration, proper handwashing and personal hygiene, follow wound care instructions provided, take antibiotic prescription as prescribed.  CM acknowledged that patient already does many of these things and encouraged her to continue doing them.  The patient states she could use an aide to help her with a bath several times a week.  CM messaged Dr. Rubi Young to send an order to [facility], who the patient is current with, for an aide several times a week.    Next steps:   Follow up in three weeks  Follow up on 3 lb/5 lb rule  Follow up on S&S of CHF  Follow up on low sodium diet  Follow up on daily weights and trends  Follow up if taking diuretic medication  Follow up if following 1700 ml fluid restriction  Follow up if avoiding processed foods  Follow up on self care steps for wounds  Follow up if got Bob  Educate on S&S of wound infection

## 2022-12-07 ENCOUNTER — NURSE TRIAGE (OUTPATIENT)
Dept: ADMINISTRATIVE | Facility: CLINIC | Age: 87
End: 2022-12-07
Payer: MEDICARE

## 2022-12-07 ENCOUNTER — TELEPHONE (OUTPATIENT)
Dept: CARDIOLOGY | Facility: CLINIC | Age: 87
End: 2022-12-07
Payer: MEDICARE

## 2022-12-07 ENCOUNTER — TELEPHONE (OUTPATIENT)
Dept: INTERNAL MEDICINE | Facility: CLINIC | Age: 87
End: 2022-12-07
Payer: MEDICARE

## 2022-12-07 ENCOUNTER — TELEPHONE (OUTPATIENT)
Dept: WOUND CARE | Facility: CLINIC | Age: 87
End: 2022-12-07
Payer: MEDICARE

## 2022-12-07 DIAGNOSIS — Z95.818 PRESENCE OF WATCHMAN LEFT ATRIAL APPENDAGE CLOSURE DEVICE: Primary | Chronic | ICD-10-CM

## 2022-12-07 RX ORDER — AMOXICILLIN 500 MG/1
2000 TABLET, FILM COATED ORAL ONCE
Qty: 4 TABLET | Refills: 0 | Status: SHIPPED | OUTPATIENT
Start: 2022-12-07 | End: 2022-12-07

## 2022-12-07 NOTE — TELEPHONE ENCOUNTER
Contacted [facility]. Both the patient's nurse and  were busy. Left message with Bhargavi. Instructed nurse to wrap the patient's lower extremity starting behind the first metatarsal and ending below the tibial tubercle of the knee. Instructed to cover the heel with the wrap.     Clara Arteaga PA-C

## 2022-12-07 NOTE — TELEPHONE ENCOUNTER
Pt states she have cellultitis in her leg , she states she have been taking tylenol but it doesn't help, pt is asking can you prescribe 2 pain killers percocet preferably  . Pt states she do not want tramadol .

## 2022-12-07 NOTE — TELEPHONE ENCOUNTER
Reason for Disposition   Nursing judgment   Unable to complete triage due to phone connection issues    Protocols used: No Protocol Available - Information Only-A-[place], No Contact or Duplicate Contact Call-A-OH    Jyoti Rico, with [employer], called to say they have been waiting for fax back of signed orders for the wound care supplies needed for Jenniffer Jimenez.  She said the orders were signed on 11/08/2022 by Dr Chito Jade.  I explained that Dr Jade is hospitalist, I am not in his office / clinic or the hospital, and I will send him a message with her request, but I do not have access to any records / orders that were initiated by Dr Jade.  She then hung up. I do not have a fax number or phone number, as she did hang up when I was not able to directly assist her.  I found these numbers and locations for [employer], and attempted to call [employer], [facility], [phone], but no answer.  Then called [employer] in [place], [place], but no answer.  Above information sent to Dr Chito Jade. Also sent message to Rubi Young DO, pcp for [place].  I was unable to complete this call.  Please contact caller directly if able, with any additional care advice.  She was asking for the orders to be faxed back to [employer], with provider signature.

## 2022-12-07 NOTE — TELEPHONE ENCOUNTER
----- Message from Brent Chapman Jr., MD sent at 12/7/2022 11:23 AM CST -----  Regarding: RE: dental appt  Needs endocarditis prophylaxis, prescription for amoxicillin sent to pharmacy  ----- Message -----  From: Sabrina Chapman MA  Sent: 12/7/2022  10:18 AM CST  To: Bernt Chapman Jr., MD  Subject: FW: dental appt                                    ----- Message -----  From: Jeri Mock  Sent: 12/7/2022  10:00 AM CST  To: [facility] Staff  Subject: dental appt                                      Pls call pt at [phone].  She states that she had a watchman put in 2 years ago and needs to know if she has to premedicate for a dental appt this afternoon.    Thank you

## 2022-12-07 NOTE — TELEPHONE ENCOUNTER
----- Message from Nereida Collado sent at 12/6/2022 12:49 PM CST -----  Contact: [phone]  Pt is requesting a call back regarding pain medication, pls advise patient does not want Tylenol. She states that cardio advised her to ask pcp. Patient wants to go to grand daughter's baby shower and is only requesting 2 pain pills.      [id number] - [place]  [address]  AT [facility] [address]  [address] [place] [address]  Phone: [phone] Fax: [phone]

## 2022-12-09 ENCOUNTER — TELEPHONE (OUTPATIENT)
Dept: INTERNAL MEDICINE | Facility: CLINIC | Age: 87
End: 2022-12-09
Payer: MEDICARE

## 2022-12-09 DIAGNOSIS — R53.81 DEBILITY: Primary | ICD-10-CM

## 2022-12-09 NOTE — TELEPHONE ENCOUNTER
----- Message from Lima Washington RN sent at 12/8/2022  2:23 PM CST -----  Regarding: Home Health Aide  Good afternoon,    I am Ms Abad's Outpatient .  I spoke to her today and she asked if I could send you a message and request a home health aide for her several times a week to help her bathe.  If you decide to order an aide, her home health agency is [facility].  If your staff could fax the orders to them it would be greatly appreciated.    Thank you,    Rona Washington RN  RN Case Manager  Outpatient Complex Care Management  [facility]  [phone]  [email or web address]

## 2022-12-15 PROCEDURE — G0179 PR HOME HEALTH MD RECERTIFICATION: ICD-10-PCS | Mod: ,,, | Performed by: INTERNAL MEDICINE

## 2022-12-15 PROCEDURE — G0179 MD RECERTIFICATION HHA PT: HCPCS | Mod: ,,, | Performed by: INTERNAL MEDICINE

## 2022-12-16 ENCOUNTER — OUTPATIENT CASE MANAGEMENT (OUTPATIENT)
Dept: ADMINISTRATIVE | Facility: OTHER | Age: 87
End: 2022-12-16
Payer: MEDICARE

## 2022-12-16 NOTE — PROGRESS NOTES
CM received phone call from patient inquiring about the home health aide ordered by Dr. Rubi Young on 12/9/2022.  CM called [facility] [phone] to see if they received the orders.  CM waited for a call back from  without any response.  CM then call [facility] again who states they received the order and an aide will contact the patient on Monday 12/19/2022.  CM then call patient back to inform her, voicemail received.  CM left voicemail message for patient.

## 2022-12-19 ENCOUNTER — TELEPHONE (OUTPATIENT)
Dept: ALLERGY | Facility: CLINIC | Age: 87
End: 2022-12-19
Payer: MEDICARE

## 2022-12-19 ENCOUNTER — TELEPHONE (OUTPATIENT)
Dept: WOUND CARE | Facility: CLINIC | Age: 87
End: 2022-12-19
Payer: MEDICARE

## 2022-12-19 ENCOUNTER — DOCUMENT SCAN (OUTPATIENT)
Dept: HOME HEALTH SERVICES | Facility: HOSPITAL | Age: 87
End: 2022-12-19
Payer: MEDICARE

## 2022-12-19 ENCOUNTER — TELEPHONE (OUTPATIENT)
Dept: INTERNAL MEDICINE | Facility: CLINIC | Age: 87
End: 2022-12-19
Payer: MEDICARE

## 2022-12-19 NOTE — TELEPHONE ENCOUNTER
Spoke to pt , pt states she have a rash all over her body , she think its shingles,pt is requesting to be seen immediately but there is no availability in clinic , [facility] or [place], told pt to go to urgent care. Pt verbalized understanding.

## 2022-12-19 NOTE — TELEPHONE ENCOUNTER
Spoke with patient informed her that allergy doesn't see adult patient for rash she verbalized she understood I told her I was sending her message to the correct department to be scheduled for an appointment and to be assisted further.

## 2022-12-19 NOTE — TELEPHONE ENCOUNTER
----- Message from Carissa Moore sent at 12/19/2022  1:42 PM CST -----  Contact: Jennifer cintia/[facility]  [phone]  Jennifer is calling in regards to pt having a rash all over her body. Pt hasn't taken a bath in over a month due to know assistance. Jennifer is wondering if some type of moisturizer can be called in for pt. Pt is using   [id number] - [place] [facility] [address]  AT [facility] [address]  [address] [place] [address]  Phone: [phone] Fax: [phone]          Thank you

## 2022-12-19 NOTE — TELEPHONE ENCOUNTER
Returned call and spoke with patient, advised patient to stop Cipro antibiotic and keep scheduled appointment for tomorrow morning.  Also advised patient that she can take a shower if she protects the dressing on her leg with a cast protector.

## 2022-12-19 NOTE — TELEPHONE ENCOUNTER
----- Message from Abby Joya sent at 12/19/2022  2:11 PM CST -----  Regarding: appt  Contact: [phone]  PT is requesting a call in regards to rash all over her body. PT has an appt on 12/20/22 and would like to know if she can be advised on this matter at her appt. Please call to discuss further.

## 2022-12-19 NOTE — TELEPHONE ENCOUNTER
----- Message from Gabby Rodriguez sent at 12/19/2022 10:37 AM CST -----  Regarding: Rash Appt For Tomorrow  Contact: @[phone]  Pt requesting a call back to see to she can be seen tomorrow for a rash she has possibly because not bathing in over a month due to a wound on her leg.  Pt just concern about the rash and she is on antibiotic but just want to make sure its nothing else going on.  Due to transportation she can be seen tomorrow after her appt with Clara in wound care at 9:30.  Please call to discuss further.  Pt is also hearing impaired.

## 2022-12-20 ENCOUNTER — OFFICE VISIT (OUTPATIENT)
Dept: URGENT CARE | Facility: CLINIC | Age: 87
End: 2022-12-20
Payer: MEDICARE

## 2022-12-20 ENCOUNTER — OFFICE VISIT (OUTPATIENT)
Dept: WOUND CARE | Facility: CLINIC | Age: 87
End: 2022-12-20
Payer: MEDICARE

## 2022-12-20 VITALS
SYSTOLIC BLOOD PRESSURE: 140 MMHG | TEMPERATURE: 99 F | BODY MASS INDEX: 26.63 KG/M2 | OXYGEN SATURATION: 99 % | HEART RATE: 89 BPM | DIASTOLIC BLOOD PRESSURE: 66 MMHG | RESPIRATION RATE: 16 BRPM | WEIGHT: 160 LBS

## 2022-12-20 VITALS
BODY MASS INDEX: 26.93 KG/M2 | HEART RATE: 91 BPM | DIASTOLIC BLOOD PRESSURE: 59 MMHG | HEIGHT: 65 IN | SYSTOLIC BLOOD PRESSURE: 123 MMHG | TEMPERATURE: 98 F | WEIGHT: 161.63 LBS

## 2022-12-20 DIAGNOSIS — R21 RASH: ICD-10-CM

## 2022-12-20 DIAGNOSIS — S81.801A NON-HEALING WOUND OF RIGHT LOWER EXTREMITY: Primary | ICD-10-CM

## 2022-12-20 DIAGNOSIS — I87.2 VENOUS INSUFFICIENCY OF BOTH LOWER EXTREMITIES: ICD-10-CM

## 2022-12-20 DIAGNOSIS — I50.32 CHRONIC DIASTOLIC HEART FAILURE: ICD-10-CM

## 2022-12-20 DIAGNOSIS — N18.32 STAGE 3B CHRONIC KIDNEY DISEASE: ICD-10-CM

## 2022-12-20 DIAGNOSIS — E13.42 DIABETIC POLYNEUROPATHY ASSOCIATED WITH OTHER SPECIFIED DIABETES MELLITUS: ICD-10-CM

## 2022-12-20 DIAGNOSIS — M25.559 HIP PAIN: Primary | ICD-10-CM

## 2022-12-20 DIAGNOSIS — I10 PRIMARY HYPERTENSION: ICD-10-CM

## 2022-12-20 DIAGNOSIS — Z86.73 HISTORY OF TIA (TRANSIENT ISCHEMIC ATTACK): ICD-10-CM

## 2022-12-20 PROCEDURE — 1159F MED LIST DOCD IN RCRD: CPT | Mod: CPTII,S$GLB,, | Performed by: NURSE PRACTITIONER

## 2022-12-20 PROCEDURE — 99214 OFFICE O/P EST MOD 30 MIN: CPT | Mod: 25,HCNC,S$GLB,

## 2022-12-20 PROCEDURE — 1159F PR MEDICATION LIST DOCUMENTED IN MEDICAL RECORD: ICD-10-PCS | Mod: CPTII,S$GLB,, | Performed by: NURSE PRACTITIONER

## 2022-12-20 PROCEDURE — 1125F AMNT PAIN NOTED PAIN PRSNT: CPT | Mod: HCNC,CPTII,S$GLB,

## 2022-12-20 PROCEDURE — 99213 PR OFFICE/OUTPT VISIT, EST, LEVL III, 20-29 MIN: ICD-10-PCS | Mod: S$GLB,,, | Performed by: NURSE PRACTITIONER

## 2022-12-20 PROCEDURE — 11042 DBRDMT SUBQ TIS 1ST 20SQCM/<: CPT | Mod: HCNC,S$GLB,,

## 2022-12-20 PROCEDURE — 1101F PR PT FALLS ASSESS DOC 0-1 FALLS W/OUT INJ PAST YR: ICD-10-PCS | Mod: HCNC,CPTII,S$GLB,

## 2022-12-20 PROCEDURE — 99999 PR PBB SHADOW E&M-EST. PATIENT-LVL IV: ICD-10-PCS | Mod: PBBFAC,HCNC,,

## 2022-12-20 PROCEDURE — 1125F PR PAIN SEVERITY QUANTIFIED, PAIN PRESENT: ICD-10-PCS | Mod: CPTII,S$GLB,, | Performed by: NURSE PRACTITIONER

## 2022-12-20 PROCEDURE — 99999 PR PBB SHADOW E&M-EST. PATIENT-LVL IV: CPT | Mod: PBBFAC,HCNC,,

## 2022-12-20 PROCEDURE — 1125F PR PAIN SEVERITY QUANTIFIED, PAIN PRESENT: ICD-10-PCS | Mod: HCNC,CPTII,S$GLB,

## 2022-12-20 PROCEDURE — 11045 DEBRIDEMENT: ICD-10-PCS | Mod: HCNC,S$GLB,,

## 2022-12-20 PROCEDURE — 1160F PR REVIEW ALL MEDS BY PRESCRIBER/CLIN PHARMACIST DOCUMENTED: ICD-10-PCS | Mod: CPTII,S$GLB,, | Performed by: NURSE PRACTITIONER

## 2022-12-20 PROCEDURE — 1159F PR MEDICATION LIST DOCUMENTED IN MEDICAL RECORD: ICD-10-PCS | Mod: HCNC,CPTII,S$GLB,

## 2022-12-20 PROCEDURE — 1101F PT FALLS ASSESS-DOCD LE1/YR: CPT | Mod: HCNC,CPTII,S$GLB,

## 2022-12-20 PROCEDURE — 3288F PR FALLS RISK ASSESSMENT DOCUMENTED: ICD-10-PCS | Mod: HCNC,CPTII,S$GLB,

## 2022-12-20 PROCEDURE — 3288F FALL RISK ASSESSMENT DOCD: CPT | Mod: HCNC,CPTII,S$GLB,

## 2022-12-20 PROCEDURE — 1125F AMNT PAIN NOTED PAIN PRSNT: CPT | Mod: CPTII,S$GLB,, | Performed by: NURSE PRACTITIONER

## 2022-12-20 PROCEDURE — 99213 OFFICE O/P EST LOW 20 MIN: CPT | Mod: S$GLB,,, | Performed by: NURSE PRACTITIONER

## 2022-12-20 PROCEDURE — 1159F MED LIST DOCD IN RCRD: CPT | Mod: HCNC,CPTII,S$GLB,

## 2022-12-20 PROCEDURE — 11045 DBRDMT SUBQ TISS EACH ADDL: CPT | Mod: HCNC,S$GLB,,

## 2022-12-20 PROCEDURE — 11042 DEBRIDEMENT: ICD-10-PCS | Mod: HCNC,S$GLB,,

## 2022-12-20 PROCEDURE — 1160F RVW MEDS BY RX/DR IN RCRD: CPT | Mod: CPTII,S$GLB,, | Performed by: NURSE PRACTITIONER

## 2022-12-20 PROCEDURE — 99214 PR OFFICE/OUTPT VISIT, EST, LEVL IV, 30-39 MIN: ICD-10-PCS | Mod: 25,HCNC,S$GLB,

## 2022-12-20 RX ORDER — DICLOFENAC SODIUM 10 MG/G
2 GEL TOPICAL 4 TIMES DAILY
Qty: 100 G | Refills: 1 | Status: ON HOLD | OUTPATIENT
Start: 2022-12-20 | End: 2023-01-24

## 2022-12-20 RX ORDER — LIDOCAINE 50 MG/G
1 PATCH TOPICAL DAILY
Qty: 5 PATCH | Refills: 0 | Status: ON HOLD | OUTPATIENT
Start: 2022-12-20 | End: 2023-01-24 | Stop reason: SDUPTHER

## 2022-12-20 NOTE — PROGRESS NOTES
Subjective:       Patient ID: Jenniffer Jimenez is a 89 y.o. female.    Chief Complaint: Wound Check      Patient presents for a reevaluation of a right lower extremity wound. Pt has [facility] home [facility] that comes out 3x a week. Patient reports a full body rash excluding her face that started about a week ago. She states she got the rash on day 7 of her 10 day Ciprofloxacin prescription. Pt does not think its from her antibiotics due to taking Ciprofloxacin multiple times prior. She reports starting a new rinse soap about a week ago. Pt also reports that her daughter recently was diagnosed with Shingles. She states that she is fully vaccinated against shingles. She states that she is very itchy and uncomfortable. Denies trying anything to alleviate the itch. Pt is concerned about being contagious. Denies fever, chills, erythema, warmth, or pain.    ABIs 11/90/22:  Right HOLLY:1.17  Left HOLLY: 1.14    Review of Systems   Constitutional:  Negative for activity change, chills, diaphoresis, fatigue and fever.   Respiratory:  Negative for apnea, chest tightness and shortness of breath.    Cardiovascular:  Positive for leg swelling. Negative for chest pain and palpitations.   Musculoskeletal:  Negative for gait problem and joint swelling.   Skin:  Positive for wound. Negative for color change, pallor and rash.   Neurological:  Negative for syncope, weakness and numbness.   Psychiatric/Behavioral:  Negative for agitation. The patient is not nervous/anxious.    All other systems reviewed and are negative.    Objective:      Physical Exam  Vitals reviewed.   Constitutional:       General: She is not in acute distress.     Appearance: Normal appearance.   Cardiovascular:      Rate and Rhythm: Normal rate and regular rhythm.      Pulses: Normal pulses.   Pulmonary:      Effort: No respiratory distress.   Musculoskeletal:         General: No swelling.      Right lower leg: Edema present.   Skin:     General: Skin is warm and  dry.      Capillary Refill: Capillary refill takes less than 2 seconds.      Findings: Rash (Noted to full body excluding face) and wound present. No erythema. Rash is crusting and macular.          Neurological:      General: No focal deficit present.      Mental Status: She is alert and oriented to person, place, and time.   Psychiatric:         Mood and Affect: Mood normal.         Behavior: Behavior normal.         Thought Content: Thought content normal.         Judgment: Judgment normal.       Assessment:       1. Non-healing wound of right lower extremity    2. Venous insufficiency of both lower extremities    3. History of TIA (transient ischemic attack)    4. Diabetic polyneuropathy associated with other specified diabetes mellitus    5. Primary hypertension    6. Chronic diastolic heart failure    7. Stage 3b chronic kidney disease    8. BMI 26.0-26.9,adult           Altered Skin Integrity 09/25/22 2010 Right anterior;lower Leg #1 (Active)   09/25/22 2010   Altered Skin Integrity Present on Admission: yes   Side: Right   Orientation: anterior;lower   Location: Leg   Wound Number: #1   Is this injury device related?:    Primary Wound Type:    Description of Altered Skin Integrity:    Ankle-Brachial Index:    Pulses:    Removal Indication and Assessment:    Wound Outcome:    (Retired) Wound Length (cm):    (Retired) Wound Width (cm):    (Retired) Depth (cm):    Wound Description (Comments):    Removal Indications:    Wound Image      12/20/22 0949   Dressing Appearance Dry;Intact;Clean;Moist drainage 12/20/22 0949   Drainage Amount Large 12/20/22 0949   Drainage Characteristics/Odor Yellow;Tan 12/20/22 0949   Red (%), Wound Tissue Color 50 % 12/20/22 0949   Yellow (%), Wound Tissue Color 50 % 12/20/22 0949   Periwound Area Intact;Edematous;Other (see comments) 12/20/22 0949   Wound Length (cm) 15.2 cm 12/20/22 0949   Wound Width (cm) 30.2 cm 12/20/22 0949   Wound Depth (cm) 0.1 cm 12/20/22 0949   Wound  Volume (cm^3) 45.904 cm^3 12/20/22 0949   Wound Surface Area (cm^2) 459.04 cm^2 12/20/22 0949   Care Cleansed with:;Soap and water 12/20/22 0949   Dressing Applied;Calcium alginate;Silver;Absorptive Pad;Compression wrap;Other (comment) 12/20/22 0949   Periwound Care Skin barrier film applied 12/20/22 0949   Compression Two layer compression 12/20/22 0949        Jenniffer was seen in the clinic room and placed in the supine position on the treatment table.  The wound dressing was removed and the leg was cleansed with Easi-clense sponges and dried thoroughly. No odor, erythema, green drainage, or warmth noted from patient's baseline. Sharp debridement with scissors and pickups to remove non-viable tissue. Pt tolerated procedure well.      Plan of Care: Aquacel Ag to wound bed, calmoseptine to periwound, drawtex, mextra pads, and covered with a 2 layer calamine wrap. The patient's foot was positioned at a 90 degree angle.  The wrap was applied using a spiral technique avoiding creases or folds.  The wrap was started behind the first metatarsal and ended below the tibial tubercle of the knee.  There was overlap of each turn half the width of the previous turn.    Plan:       Right lower extremity dressed as detailed above.  Patient was instructed to not get the dressings wet and to use cast covers for showering.  Should the dressing become wet, she is to remove it, place a moist dressing over the wound, cover with gauze and roll gauze and use ace wraps for compression and to secure bandages.  She should then notify this office or home health as soon as possible to have a new dressing applied.  Instructed patient to remove wrap if she notices tingling or coldness to her right lower extremity or if her toes become white, blue, or cold.    Faxed home health care orders to [facility]:  HOME HEALTH WOUND CARE ORDERS  D/C previous orders  Wound care and nurse visits: 3X a week and PRN complications  1. Cleanse wound with saline, mild  soap and water, or wound cleanser  2.Apply aquacel Ag to wound bed, drawtex, mextra pads, and wrap leg in a 2 layer calamine wrap from below her first metatarsal and ending below the tibial tubercle of the knee  3.Cover with appropriate cover dressing  Monitor for infection and teach patient/caregiver signs and symptoms    Discussed nutrition and the role of protein in wound healing with the patient. Instructed patient to continue to optimize protein for wound healing within CKD protein limitations.     Instructed patient to elevate legs whenever sedentary, to follow a low salt diet, and take lasix as prescribed.    Discussed rash with patient- unlikely due to antibiotics due to rash appearing on day 7 of a 10 day prescription and previously tolerating medication. Rash does not follow dermatomal patterns- unlikely Shingles. Discussed being evaluated by PCP, urgent care, or dermatology. Pt opted for urgent care.    All questions were answered.  Written and verbal instructions given to patient  RTC in 3 week      Clara Arteaga PA-C

## 2022-12-20 NOTE — PROGRESS NOTES
"Subjective:       Patient ID: Jenniffer Jimenez is a 89 y.o. female.    Vitals:  weight is 72.6 kg (160 lb). Her oral temperature is 99.1 °F (37.3 °C). Her blood pressure is 140/66 (abnormal) and her pulse is 89. Her respiration is 16 and oxygen saturation is 99%.     Chief Complaint: Rash    This is a 89 y.o. female who presents today with a chief complaint of rash all over body x 2 weeks.  + pruritic. States no new known exposure, started on cipro for R leg cellulitis and developed rash shortly after, however, patient tolerated cipro in the past without rash.  Cipro was subsequently d/c by prescribing provider. No fever or chills. Rash is not stinging or painful.      Pt is also is experiencing R leg pain x 1 week. Pain is located to R lower leg and radiates to R hip and groin. Constant, 10/10. No trauma or fall. No back pain, numbness or tingling, or weakness. Able to bear weight but ambulation is painful.  Transferring from chair to standing and sitting causes worsening of pain. Rubbing it "feels better".  + swelling and px in upper leg due to the wound bandage to R lower leg. Ambulates with a rolling walker.     Pt does not like to tale px  mds due to "dimentia" side-effects      Rash  The affected locations include the abdomen, chest, back, torso, left arm, left wrist, right arm, right shoulder, right lower leg and right upper leg. The rash is characterized by redness and itchiness.     Genitourinary:  Negative for dysuria, frequency, urgency and pelvic pain.   Musculoskeletal:  Positive for arthritis, muscle cramps and muscle ache. Negative for trauma, joint pain, joint swelling, abnormal ROM of joint, back pain and history of spine disorder.   Skin:  Positive for rash. Negative for color change, pale, erythema and bruising.   Neurological:  Negative for tingling and seizures.     Objective:      Physical Exam   Constitutional: She is oriented to person, place, and time. She appears well-developed. She is " cooperative. No distress.   HENT:   Head: Normocephalic and atraumatic.   Nose: Nose normal.   Mouth/Throat: Oropharynx is clear and moist and mucous membranes are normal.   Eyes: Conjunctivae and lids are normal.   Neck: Trachea normal and phonation normal. Neck supple.   Cardiovascular: Normal rate.   Pulmonary/Chest: Effort normal. No respiratory distress.   Abdominal: Normal appearance. She exhibits no distension, no abdominal bruit and no pulsatile midline mass. There is no guarding, no left CVA tenderness and no right CVA tenderness.   Musculoskeletal: Normal range of motion.         General: Tenderness present. No swelling, deformity or signs of injury. Normal range of motion.      Right lower leg: No edema.      Left lower leg: No edema.      Comments: Painful ambulation with walker, passive ROM of R hip produces severe discomfort, no palpation tenderness of lower spine, buttock, + tenderness with palpation of R lateral hip and thigh. + weight bearing but painful   Neurological: She is alert and oriented to person, place, and time. She has normal strength and normal reflexes. She displays no weakness. Coordination normal.      Comments: Unable to thoroughly assess due to patient discomfort and debility but states + numbness to R foot, + sensation, wiggles toes   Skin: Skin is warm, dry, intact, not diaphoretic, not pale and rash. No bruising and No erythema         Comments: Scattered macular papular rash to torso, UEs   Psychiatric: Her speech is normal and behavior is normal. Judgment and thought content normal.   Nursing note and vitals reviewed.      Assessment:       1. Hip pain    2. Rash        Differentials related to risk factors include but not limited to DVT, septic arthritis, arterio iliac insufficiency, trochanteric bursitis. Given severity of pain and presentation recommend further eval with possible imaging. Advised ER referral, patient would like to explore pain management options which are  limited given her current wound and advanced renal dz. Patient and family member advised of potential outcomes related to previously noted differentials, they will consider ER   Plan:         Hip pain  -     LIDOcaine (LIDODERM) 5 %; Place 1 patch onto the skin once daily. Remove & Discard patch within 12 hours or as directed by provider  Dispense: 5 patch; Refill: 0  -     diclofenac sodium (VOLTAREN) 1 % Gel; Apply 2 g topically 4 (four) times daily. Use gloves to apply for 10 days  Dispense: 100 g; Refill: 1    Rash    -likely allergic dermatitis, advised of skin care, f/u with PCP               Patient Instructions   See additional patient Instructions provided      May take Zyrtec or Claritin or Benadryl daily as directed for itching  Moisturize daily after showering  Avoid hot water with bathing as this may worsen itching  Aveeno oatmeal bath, over the counter, use as directed on  packaging.  This may improve skin itching and irritation  May use Calamine lotion, over the counter, use as directed on  packaging for itching    The best way to prevent pruritus is to take care of your skin. To protect skin:  Use skin creams and lotions that moisturize your skin and prevent dryness. Example: SARNA Sensitive lotion provides the maximum amount of anti-itch medication you can find without a prescription. And because it is fragrance- and steroid-free, it is safe for daily use  Use sunscreens regularly to prevent sunburns and skin damage.   Use mild bath soap that won't irritate your skin.   Take a bath or shower in warm -- not hot -- water.   Avoid certain fabrics, such as wool and synthetics, that can make skin itch. Switch to cotton clothing and bed sheets.   Since warm, dry air can make skin dry, keep the thermostat in your house down and use a humidifier.   To relieve itching, place a cool washcloth or some ice over the area that itches, rather than scratching.       Rest and elevated  affected limb   May apply ice x 15-20 minutes 3-4 times per day as needed to affected area of pain  Tylenol as needed for pain if not contraindicated  Voltaren or Faisal galarza topical, use as directed on  packaging  Or may use Salon pas patches, also over the counter, use as directed on  packaging  Healing may take up to 4-6 weeks, follow up with PCP for no improvement  If symptoms worsen be sure to call your PCP for prompt assessment    Patient Instructions   - You must understand that you have received an Urgent Care treatment only and that you may be released before all of your medical problems are known or treated.   - You, the patient, will arrange for follow up care as instructed.   - If your condition worsens or fails to improve we recommend that you receive another evaluation at the ER immediately or contact your PCP to discuss your concerns or return here.     Advised on return/follow-up precautions. Advised on ER precautions. Answered all patient questions. Patient verbalized understanding and voiced agreement with current treatment plan.

## 2022-12-20 NOTE — PATIENT INSTRUCTIONS
See additional patient Instructions provided      May take Zyrtec or Claritin or Benadryl daily as directed for itching  Moisturize daily after showering  Avoid hot water with bathing as this may worsen itching  Aveeno oatmeal bath, over the counter, use as directed on  packaging.  This may improve skin itching and irritation  May use Calamine lotion, over the counter, use as directed on  packaging for itching    The best way to prevent pruritus is to take care of your skin. To protect skin:  Use skin creams and lotions that moisturize your skin and prevent dryness. Example: SARNA Sensitive lotion provides the maximum amount of anti-itch medication you can find without a prescription. And because it is fragrance- and steroid-free, it is safe for daily use  Use sunscreens regularly to prevent sunburns and skin damage.   Use mild bath soap that won't irritate your skin.   Take a bath or shower in warm -- not hot -- water.   Avoid certain fabrics, such as wool and synthetics, that can make skin itch. Switch to cotton clothing and bed sheets.   Since warm, dry air can make skin dry, keep the thermostat in your house down and use a humidifier.   To relieve itching, place a cool washcloth or some ice over the area that itches, rather than scratching.       Rest and elevated affected limb   May apply ice x 15-20 minutes 3-4 times per day as needed to affected area of pain  Tylenol as needed for pain if not contraindicated  Voltaren or Faisal galarza topical, use as directed on  packaging  Or may use Salon pas patches, also over the counter, use as directed on  packaging  Healing may take up to 4-6 weeks, follow up with PCP for no improvement  If symptoms worsen be sure to call your PCP for prompt assessment    Patient Instructions   - You must understand that you have received an Urgent Care treatment only and that you may be released before all of your medical problems are known or  treated.   - You, the patient, will arrange for follow up care as instructed.   - If your condition worsens or fails to improve we recommend that you receive another evaluation at the ER immediately or contact your PCP to discuss your concerns or return here.     Advised on return/follow-up precautions. Advised on ER precautions. Answered all patient questions. Patient verbalized understanding and voiced agreement with current treatment plan.

## 2022-12-20 NOTE — PROCEDURES
"Debridement    Date/Time: 12/20/2022 9:30 AM  Performed by: Clara Arteaga PA-C  Authorized by: Clara Arteaga PA-C     Time out: Immediately prior to procedure a "time out" was called to verify the correct patient, procedure, equipment, support staff and site/side marked as required.    Consent Done?:  Yes (Written)  Local anesthesia used?: No      Wound Details:    Location:  Right leg    Type of Debridement:  Excisional       Length (cm):  15.2       Area (sq cm):  459.04       Width (cm):  30.2       Percent Debrided (%):  100       Depth (cm):  0.1       Total Area Debrided (sq cm):  459.04    Depth of debridement:  Subcutaneous tissue    Tissue debrided:  Subcutaneous    Devitalized tissue debrided:  Biofilm, Exudate, Fibrin and Slough    Instruments:  Forceps and Scissors    Bleeding:  Minimal  Hemostasis Achieved: Yes    Method Used:  Pressure  Patient tolerance:  Patient tolerated the procedure well with no immediate complications  "

## 2022-12-22 ENCOUNTER — OFFICE VISIT (OUTPATIENT)
Dept: PRIMARY CARE CLINIC | Facility: CLINIC | Age: 87
End: 2022-12-22
Payer: MEDICARE

## 2022-12-22 VITALS
OXYGEN SATURATION: 97 % | HEART RATE: 83 BPM | DIASTOLIC BLOOD PRESSURE: 66 MMHG | HEIGHT: 65 IN | WEIGHT: 159.81 LBS | SYSTOLIC BLOOD PRESSURE: 118 MMHG | BODY MASS INDEX: 26.63 KG/M2 | TEMPERATURE: 98 F

## 2022-12-22 DIAGNOSIS — T78.40XD ALLERGIC REACTION TO DRUG, SUBSEQUENT ENCOUNTER: Primary | ICD-10-CM

## 2022-12-22 PROCEDURE — 1125F PR PAIN SEVERITY QUANTIFIED, PAIN PRESENT: ICD-10-PCS | Mod: HCNC,CPTII,S$GLB, | Performed by: INTERNAL MEDICINE

## 2022-12-22 PROCEDURE — 1125F AMNT PAIN NOTED PAIN PRSNT: CPT | Mod: HCNC,CPTII,S$GLB, | Performed by: INTERNAL MEDICINE

## 2022-12-22 PROCEDURE — 99213 PR OFFICE/OUTPT VISIT, EST, LEVL III, 20-29 MIN: ICD-10-PCS | Mod: HCNC,S$GLB,, | Performed by: INTERNAL MEDICINE

## 2022-12-22 PROCEDURE — 1159F PR MEDICATION LIST DOCUMENTED IN MEDICAL RECORD: ICD-10-PCS | Mod: HCNC,CPTII,S$GLB, | Performed by: INTERNAL MEDICINE

## 2022-12-22 PROCEDURE — 1101F PT FALLS ASSESS-DOCD LE1/YR: CPT | Mod: HCNC,CPTII,S$GLB, | Performed by: INTERNAL MEDICINE

## 2022-12-22 PROCEDURE — 99999 PR PBB SHADOW E&M-EST. PATIENT-LVL V: ICD-10-PCS | Mod: PBBFAC,HCNC,, | Performed by: INTERNAL MEDICINE

## 2022-12-22 PROCEDURE — 1160F PR REVIEW ALL MEDS BY PRESCRIBER/CLIN PHARMACIST DOCUMENTED: ICD-10-PCS | Mod: HCNC,CPTII,S$GLB, | Performed by: INTERNAL MEDICINE

## 2022-12-22 PROCEDURE — 1101F PR PT FALLS ASSESS DOC 0-1 FALLS W/OUT INJ PAST YR: ICD-10-PCS | Mod: HCNC,CPTII,S$GLB, | Performed by: INTERNAL MEDICINE

## 2022-12-22 PROCEDURE — 99999 PR PBB SHADOW E&M-EST. PATIENT-LVL V: CPT | Mod: PBBFAC,HCNC,, | Performed by: INTERNAL MEDICINE

## 2022-12-22 PROCEDURE — 3288F FALL RISK ASSESSMENT DOCD: CPT | Mod: HCNC,CPTII,S$GLB, | Performed by: INTERNAL MEDICINE

## 2022-12-22 PROCEDURE — 3288F PR FALLS RISK ASSESSMENT DOCUMENTED: ICD-10-PCS | Mod: HCNC,CPTII,S$GLB, | Performed by: INTERNAL MEDICINE

## 2022-12-22 PROCEDURE — 1160F RVW MEDS BY RX/DR IN RCRD: CPT | Mod: HCNC,CPTII,S$GLB, | Performed by: INTERNAL MEDICINE

## 2022-12-22 PROCEDURE — 99213 OFFICE O/P EST LOW 20 MIN: CPT | Mod: HCNC,S$GLB,, | Performed by: INTERNAL MEDICINE

## 2022-12-22 PROCEDURE — 1159F MED LIST DOCD IN RCRD: CPT | Mod: HCNC,CPTII,S$GLB, | Performed by: INTERNAL MEDICINE

## 2022-12-22 RX ORDER — FAMOTIDINE 20 MG/1
20 TABLET, FILM COATED ORAL 2 TIMES DAILY
Qty: 30 TABLET | Refills: 0 | Status: SHIPPED | OUTPATIENT
Start: 2022-12-22 | End: 2023-02-02

## 2022-12-22 RX ORDER — LEVOCETIRIZINE DIHYDROCHLORIDE 5 MG/1
5 TABLET, FILM COATED ORAL DAILY
Qty: 30 TABLET | Refills: 0 | Status: ON HOLD | OUTPATIENT
Start: 2022-12-22 | End: 2023-01-29 | Stop reason: HOSPADM

## 2022-12-22 NOTE — PROGRESS NOTES
"Subjective:       Patient ID: Jenniffer Jimenez is a 89 y.o. female.    Chief Complaint: Rash    Patient of Dr. Young presents for an urgent visit c/o rash. Describes onset of pruritic generalized rash beginning one week ago. Denies any new exposures. It occurred just as she was nearing the completion of a course of Cipro, which she has taken several times before without adverse effect. No associated fever, chills, body aches. The rash is not painful. She has used Benadryl with relief of the itching, but has only had 3-4 doses over the week that she's had the rash. Presented to Urgent Care two days ago but spent much time taking about leg pain. Nothing was prescribed for the rash. Has chronic cellulitis of the right lower extremity for which she is followed closely by Wound care. She insists she was told by Wound Care not to take ANY steroids, I don't see this documented. She has pre-diabetes, HbA1c 6.0%, no hyperglycemia noted on recent labs.     Past history reviewed. History of allergies, treated with "allergy shots" in the past.   Medication allergies include opiates, tramadol, tizanidine, beta blockers.       Review of Systems   Constitutional:  Negative for fever.   Respiratory:  Negative for cough, shortness of breath and stridor.        Objective:      Vitals:    12/22/22 1117   BP: 118/66   BP Location: Right arm   Patient Position: Sitting   Pulse: 83   Temp: 97.8 °F (36.6 °C)   TempSrc: Oral   SpO2: 97%   Weight: 72.5 kg (159 lb 13.3 oz)   Height: 5' 5" (1.651 m)     Physical Exam  Constitutional:       General: She is not in acute distress.  Cardiovascular:      Rate and Rhythm: Normal rate and regular rhythm.   Pulmonary:      Effort: Pulmonary effort is normal. No respiratory distress.      Breath sounds: Normal breath sounds.   Skin:     Comments: Erythematous maculopapular rash on entire body sparing the face. Bandages not removed from right lower leg, last seen by Wound Care two days ago.  "   Neurological:      Mental Status: She is alert.       Assessment:       Problem List Items Addressed This Visit    None  Visit Diagnoses       Allergic reaction to drug, subsequent encounter    -  Primary    Relevant Medications    levocetirizine (XYZAL) 5 MG tablet    famotidine (PEPCID) 20 MG tablet    Other Relevant Orders    Ambulatory referral/consult to Allergy              Plan:       Allergic reaction to drug, subsequent encounter  -     levocetirizine (XYZAL) 5 MG tablet; Take 1 tablet (5 mg total) by mouth once daily. For Allergies.  Dispense: 30 tablet; Refill: 0  -     famotidine (PEPCID) 20 MG tablet; Take 1 tablet (20 mg total) by mouth 2 (two) times daily. For Allergic Reaction.  Dispense: 30 tablet; Refill: 0  -     NO steroids prescribed per patient request.   -     Ambulatory referral/consult to Allergy; Future; Expected date: 12/29/2022

## 2022-12-27 ENCOUNTER — TELEPHONE (OUTPATIENT)
Dept: VASCULAR SURGERY | Facility: CLINIC | Age: 87
End: 2022-12-27
Payer: MEDICARE

## 2022-12-27 NOTE — TELEPHONE ENCOUNTER
Contacted patient in response to message. States she has fluid draining from abdomen at belly folds and is not sure what is causing this. Denies wounds or severe swelling to legs or abdomen. Explained to pt that this does not seem to be a vascular problem and daughter that she should schedule appt with PCP. Both verbalized understanding. ----- Message from Nicola Simon sent at 12/27/2022  1:13 PM CST -----  Contact: [phone] or [phone]  Pt daughter is calling in ref to pt states her stomach is leaking -- shes requesting to speak with  Clara -- shes currently in pain and also has a rash -- please give her a call back [phone] or [phone] Afshan       Pt is hard of hearing

## 2022-12-28 ENCOUNTER — TELEPHONE (OUTPATIENT)
Dept: INTERNAL MEDICINE | Facility: CLINIC | Age: 87
End: 2022-12-28
Payer: MEDICARE

## 2022-12-28 ENCOUNTER — OFFICE VISIT (OUTPATIENT)
Dept: INTERNAL MEDICINE | Facility: CLINIC | Age: 87
End: 2022-12-28
Payer: MEDICARE

## 2022-12-28 VITALS
SYSTOLIC BLOOD PRESSURE: 132 MMHG | HEIGHT: 65 IN | OXYGEN SATURATION: 99 % | WEIGHT: 169.56 LBS | TEMPERATURE: 97 F | BODY MASS INDEX: 28.25 KG/M2 | RESPIRATION RATE: 20 BRPM | DIASTOLIC BLOOD PRESSURE: 68 MMHG | HEART RATE: 83 BPM

## 2022-12-28 DIAGNOSIS — L30.4 INTERTRIGO: Primary | ICD-10-CM

## 2022-12-28 DIAGNOSIS — M25.551 RIGHT HIP PAIN: ICD-10-CM

## 2022-12-28 PROCEDURE — 1125F PR PAIN SEVERITY QUANTIFIED, PAIN PRESENT: ICD-10-PCS | Mod: HCNC,CPTII,S$GLB, | Performed by: HOSPITALIST

## 2022-12-28 PROCEDURE — 1101F PT FALLS ASSESS-DOCD LE1/YR: CPT | Mod: HCNC,CPTII,S$GLB, | Performed by: HOSPITALIST

## 2022-12-28 PROCEDURE — 1101F PR PT FALLS ASSESS DOC 0-1 FALLS W/OUT INJ PAST YR: ICD-10-PCS | Mod: HCNC,CPTII,S$GLB, | Performed by: HOSPITALIST

## 2022-12-28 PROCEDURE — 99213 OFFICE O/P EST LOW 20 MIN: CPT | Mod: HCNC,S$GLB,, | Performed by: HOSPITALIST

## 2022-12-28 PROCEDURE — 3288F PR FALLS RISK ASSESSMENT DOCUMENTED: ICD-10-PCS | Mod: HCNC,CPTII,S$GLB, | Performed by: HOSPITALIST

## 2022-12-28 PROCEDURE — 1125F AMNT PAIN NOTED PAIN PRSNT: CPT | Mod: HCNC,CPTII,S$GLB, | Performed by: HOSPITALIST

## 2022-12-28 PROCEDURE — 3288F FALL RISK ASSESSMENT DOCD: CPT | Mod: HCNC,CPTII,S$GLB, | Performed by: HOSPITALIST

## 2022-12-28 PROCEDURE — 99213 PR OFFICE/OUTPT VISIT, EST, LEVL III, 20-29 MIN: ICD-10-PCS | Mod: HCNC,S$GLB,, | Performed by: HOSPITALIST

## 2022-12-28 PROCEDURE — 99999 PR PBB SHADOW E&M-EST. PATIENT-LVL V: CPT | Mod: PBBFAC,HCNC,, | Performed by: HOSPITALIST

## 2022-12-28 PROCEDURE — 99999 PR PBB SHADOW E&M-EST. PATIENT-LVL V: ICD-10-PCS | Mod: PBBFAC,HCNC,, | Performed by: HOSPITALIST

## 2022-12-28 RX ORDER — CLOTRIMAZOLE 1 %
CREAM (GRAM) TOPICAL 2 TIMES DAILY
Qty: 60 G | Refills: 0 | Status: SHIPPED | OUTPATIENT
Start: 2022-12-28 | End: 2022-12-28 | Stop reason: SDUPTHER

## 2022-12-28 RX ORDER — CLOTRIMAZOLE 1 %
CREAM (GRAM) TOPICAL 2 TIMES DAILY
Qty: 60 G | Refills: 0 | Status: ON HOLD | OUTPATIENT
Start: 2022-12-28 | End: 2023-01-16 | Stop reason: HOSPADM

## 2022-12-28 NOTE — TELEPHONE ENCOUNTER
----- Message from Olimpia Moore sent at 12/28/2022 12:30 PM CST -----  Contact: F[facility]/ [phone]   Nurse  Fartun is returning a phone call.  Who left a message for the patient: Marjorie Steward LPN  Does patient know what this is regarding:    Would you like a call back, or a response through your [facility] portal?:     Comments:

## 2022-12-28 NOTE — PROGRESS NOTES
"Subjective:     @Patient ID: Jenniffer Jimenez is a 89 y.o. female.    Chief Complaint: Leg Pain (Right leg pain at a 10) and Abdominal Pain (Under stomach has liquid coming from it )    HPI    89 yo F presents for urgent visit with c/o rash of abdomen. Accompanied today by family   Also reporting r hip pain     Review of Systems   Musculoskeletal:  Positive for arthralgias.   Skin:  Positive for rash.   Past medical history, surgical history, and family medical history reviewed and updated as appropriate.    Medications and allergies reviewed.     Objective:     Vitals:    12/28/22 0829   BP: 132/68   BP Location: Right arm   Patient Position: Sitting   BP Method: Small (Manual)   Pulse: 83   Resp: 20   Temp: 96.8 °F (36 °C)   TempSrc: Temporal   SpO2: 99%   Weight: 76.9 kg (169 lb 8.5 oz)   Height: 5' 5" (1.651 m)     Body mass index is 28.21 kg/m².  Physical Exam  Constitutional:       Appearance: Normal appearance.   Eyes:      Conjunctiva/sclera: Conjunctivae normal.   Pulmonary:      Effort: Pulmonary effort is normal.   Musculoskeletal:      Cervical back: Normal range of motion and neck supple.   Skin:     General: Skin is warm and dry.      Findings: Rash present.   Neurological:      Mental Status: She is alert and oriented to person, place, and time.   Psychiatric:         Mood and Affect: Mood normal.         Behavior: Behavior normal.     Lab Results   Component Value Date    WBC 3.56 (L) 11/14/2022    HGB 9.2 (L) 11/14/2022    HCT 27.6 (L) 11/14/2022     11/14/2022    CHOL 170 09/20/2022    TRIG 76 09/20/2022    HDL 49 09/20/2022    ALT 21 11/14/2022    AST 25 11/14/2022     11/14/2022    K 4.4 11/14/2022     11/14/2022    CREATININE 1.2 11/14/2022    BUN 35 (H) 11/14/2022    CO2 24 11/14/2022    TSH 2.188 10/07/2022    INR 1.0 10/06/2020    GLUF 101 08/14/2007    HGBA1C 6.0 (H) 09/20/2022             Assessment:     1. Intertrigo    2. Right hip pain      Plan:   Jenniffer was seen " today for leg pain and abdominal pain.    Diagnoses and all orders for this visit:    Intertrigo  -     clotrimazole (LOTRIMIN) 1 % cream; Apply topically 2 (two) times daily. Apply up to 4 weeks to affected area    Right hip pain  - recommend tylenol prn. Pt has multiple allergies          No follow-ups on file.    Rachael Case MD  Internal Medicine    12/28/2022

## 2022-12-28 NOTE — TELEPHONE ENCOUNTER
----- Message from Rachael Case MD sent at 12/28/2022  9:01 AM CST -----  Regarding: call [facility]  Please call [facility] and give verbal for:    1. Pt to be bathed twice a week. Apply barrier cream to skin folds of abdomen   2. Physical therapy eval and treat for R hip pain/bursitis. Pt requesting [facility] physical therapist

## 2022-12-28 NOTE — TELEPHONE ENCOUNTER
I called [facility] to give verbal orders. The nurse is away from her desk.   Message left to have Rani call me back.

## 2022-12-30 ENCOUNTER — NURSE TRIAGE (OUTPATIENT)
Dept: ADMINISTRATIVE | Facility: CLINIC | Age: 87
End: 2022-12-30
Payer: MEDICARE

## 2022-12-31 NOTE — TELEPHONE ENCOUNTER
High bed, steps onto bed, also celllulitis to right leg, thigh is already sore, when pt was stepping into high bed she pulled muscle to thigh of right leg and now muscle is hurting (above cellulitis and dressing). Has dressing to leg where cellulitis is but pain only to above dressing on thigh. Pain is constant when sitting and walking.     Advised needs to be seen now. VU but caller wants to know if she needs to heat or ice now. Advised with muscle strains, elevate and use cold compress for first 48 hours then heat after 48 hours but again reiterated ED dispo now. VU   Reason for Disposition   [1] Cast on leg or ankle AND [2] now increased pain    Additional Information   Negative: Looks like a broken bone or dislocated joint (e.g., crooked or deformed)   Negative: Sounds like a life-threatening emergency to the triager   Negative: Chest pain   Negative: Difficulty breathing   Negative: Entire foot is cool or blue in comparison to other side   Negative: Unable to walk    Protocols used: Leg Pain-A-AH

## 2023-01-03 NOTE — TELEPHONE ENCOUNTER
Called the patient and asked about the pain they were experiencing in their leg and patient stated they had a lot of fluid built up a few days ago (over the holiday) but is doing fine now. Patient would like to keep their appointment on the 1/10.   [facility] requested a H & H to be drawn prior to discharge and that the pt's hgb be above 10. This RN sent lab down and H & H prior to discharge is 10.9/32. Karen BROUSSARD notified. Baldo COLE notified and pt transferred to [facility] via [facility].

## 2023-01-04 ENCOUNTER — TELEPHONE (OUTPATIENT)
Dept: WOUND CARE | Facility: CLINIC | Age: 88
End: 2023-01-04
Payer: MEDICARE

## 2023-01-04 ENCOUNTER — HOSPITAL ENCOUNTER (INPATIENT)
Facility: HOSPITAL | Age: 88
LOS: 15 days | Discharge: NURSING, REHAB OR LONG-TERM CARE | DRG: 603 | End: 2023-01-24
Attending: EMERGENCY MEDICINE | Admitting: HOSPITALIST
Payer: MEDICARE

## 2023-01-04 DIAGNOSIS — Z51.89 VISIT FOR WOUND CHECK: ICD-10-CM

## 2023-01-04 DIAGNOSIS — M25.559 HIP PAIN: ICD-10-CM

## 2023-01-04 DIAGNOSIS — L03.115 CELLULITIS OF RIGHT LOWER EXTREMITY: ICD-10-CM

## 2023-01-04 DIAGNOSIS — E87.5 HYPERKALEMIA: ICD-10-CM

## 2023-01-04 DIAGNOSIS — Z91.89 AT RISK FOR LONG QT SYNDROME: ICD-10-CM

## 2023-01-04 DIAGNOSIS — I87.2 VENOUS INSUFFICIENCY: ICD-10-CM

## 2023-01-04 DIAGNOSIS — N17.9 AKI (ACUTE KIDNEY INJURY): ICD-10-CM

## 2023-01-04 DIAGNOSIS — L03.90 CELLULITIS, UNSPECIFIED CELLULITIS SITE: Primary | ICD-10-CM

## 2023-01-04 DIAGNOSIS — R07.9 CHEST PAIN: ICD-10-CM

## 2023-01-04 LAB
ANION GAP SERPL CALC-SCNC: 9 MMOL/L (ref 8–16)
BASOPHILS NFR BLD: 3 % (ref 0–1.9)
BUN SERPL-MCNC: 48 MG/DL (ref 8–23)
CALCIUM SERPL-MCNC: 9.1 MG/DL (ref 8.7–10.5)
CHLORIDE SERPL-SCNC: 108 MMOL/L (ref 95–110)
CO2 SERPL-SCNC: 20 MMOL/L (ref 23–29)
CREAT SERPL-MCNC: 1.5 MG/DL (ref 0.5–1.4)
CRP SERPL-MCNC: 85.2 MG/L (ref 0–8.2)
DIFFERENTIAL METHOD: ABNORMAL
EOSINOPHIL NFR BLD: 2 % (ref 0–8)
ERYTHROCYTE [DISTWIDTH] IN BLOOD BY AUTOMATED COUNT: 16.5 % (ref 11.5–14.5)
ERYTHROCYTE [SEDIMENTATION RATE] IN BLOOD BY PHOTOMETRIC METHOD: 96 MM/HR (ref 0–36)
EST. GFR  (NO RACE VARIABLE): 32.9 ML/MIN/1.73 M^2
GLUCOSE SERPL-MCNC: 127 MG/DL (ref 70–110)
HCT VFR BLD AUTO: 31.4 % (ref 37–48.5)
HGB BLD-MCNC: 10.4 G/DL (ref 12–16)
IMM GRANULOCYTES # BLD AUTO: ABNORMAL K/UL (ref 0–0.04)
IMM GRANULOCYTES NFR BLD AUTO: ABNORMAL % (ref 0–0.5)
LYMPHOCYTES NFR BLD: 10 % (ref 18–48)
MCH RBC QN AUTO: 29.1 PG (ref 27–31)
MCHC RBC AUTO-ENTMCNC: 33.1 G/DL (ref 32–36)
MCV RBC AUTO: 88 FL (ref 82–98)
MONOCYTES NFR BLD: 9 % (ref 4–15)
NEUTROPHILS NFR BLD: 76 % (ref 38–73)
NRBC BLD-RTO: 0 /100 WBC
PLATELET # BLD AUTO: 252 K/UL (ref 150–450)
PLATELET BLD QL SMEAR: ABNORMAL
PMV BLD AUTO: 10.2 FL (ref 9.2–12.9)
POTASSIUM SERPL-SCNC: 5.9 MMOL/L (ref 3.5–5.1)
RBC # BLD AUTO: 3.58 M/UL (ref 4–5.4)
SCHISTOCYTES BLD QL SMEAR: ABNORMAL
SODIUM SERPL-SCNC: 137 MMOL/L (ref 136–145)
WBC # BLD AUTO: 7.29 K/UL (ref 3.9–12.7)

## 2023-01-04 PROCEDURE — 99285 EMERGENCY DEPT VISIT HI MDM: CPT | Mod: GC,,, | Performed by: EMERGENCY MEDICINE

## 2023-01-04 PROCEDURE — 85652 RBC SED RATE AUTOMATED: CPT | Mod: HCNC

## 2023-01-04 PROCEDURE — 86140 C-REACTIVE PROTEIN: CPT | Mod: HCNC

## 2023-01-04 PROCEDURE — 85007 BL SMEAR W/DIFF WBC COUNT: CPT | Mod: HCNC

## 2023-01-04 PROCEDURE — 85027 COMPLETE CBC AUTOMATED: CPT | Mod: HCNC

## 2023-01-04 PROCEDURE — 96374 THER/PROPH/DIAG INJ IV PUSH: CPT | Mod: HCNC

## 2023-01-04 PROCEDURE — 99285 PR EMERGENCY DEPT VISIT,LEVEL V: ICD-10-PCS | Mod: GC,,, | Performed by: EMERGENCY MEDICINE

## 2023-01-04 PROCEDURE — 99285 EMERGENCY DEPT VISIT HI MDM: CPT | Mod: 25,HCNC

## 2023-01-04 PROCEDURE — 83880 ASSAY OF NATRIURETIC PEPTIDE: CPT | Mod: HCNC

## 2023-01-04 PROCEDURE — 96375 TX/PRO/DX INJ NEW DRUG ADDON: CPT | Mod: HCNC

## 2023-01-04 PROCEDURE — 80048 BASIC METABOLIC PNL TOTAL CA: CPT | Mod: HCNC

## 2023-01-04 RX ORDER — DOXYCYCLINE HYCLATE 100 MG
100 TABLET ORAL
Status: DISCONTINUED | OUTPATIENT
Start: 2023-01-04 | End: 2023-01-05

## 2023-01-04 NOTE — TELEPHONE ENCOUNTER
Spoke with the patient and informed her that she go to the ED if she's having that much pain are call her PCP to see if she can call her something in for pain.

## 2023-01-04 NOTE — Clinical Note
Diagnosis: Cellulitis, unspecified cellulitis site [[id number]]   Future Attending Provider: BARRY DALEY [[id number]]   Admitting Provider:: BARRY DALEY [[id number]]

## 2023-01-05 PROBLEM — E87.5 HYPERKALEMIA: Status: ACTIVE | Noted: 2023-01-05

## 2023-01-05 PROBLEM — N17.9 AKI (ACUTE KIDNEY INJURY): Status: ACTIVE | Noted: 2023-01-05

## 2023-01-05 LAB
ANION GAP SERPL CALC-SCNC: 7 MMOL/L (ref 8–16)
ANION GAP SERPL CALC-SCNC: 7 MMOL/L (ref 8–16)
ANISOCYTOSIS BLD QL SMEAR: SLIGHT
BASOPHILS NFR BLD: 1 % (ref 0–1.9)
BNP SERPL-MCNC: 134 PG/ML (ref 0–99)
BUN SERPL-MCNC: 39 MG/DL (ref 8–23)
BUN SERPL-MCNC: 40 MG/DL (ref 8–23)
BUN SERPL-MCNC: 44 MG/DL (ref 6–30)
CALCIUM SERPL-MCNC: 8.5 MG/DL (ref 8.7–10.5)
CALCIUM SERPL-MCNC: 8.6 MG/DL (ref 8.7–10.5)
CHLORIDE SERPL-SCNC: 107 MMOL/L (ref 95–110)
CHLORIDE SERPL-SCNC: 108 MMOL/L (ref 95–110)
CHLORIDE SERPL-SCNC: 109 MMOL/L (ref 95–110)
CO2 SERPL-SCNC: 21 MMOL/L (ref 23–29)
CO2 SERPL-SCNC: 21 MMOL/L (ref 23–29)
CREAT SERPL-MCNC: 1.4 MG/DL (ref 0.5–1.4)
CREAT SERPL-MCNC: 1.4 MG/DL (ref 0.5–1.4)
CREAT SERPL-MCNC: 1.5 MG/DL (ref 0.5–1.4)
DACRYOCYTES BLD QL SMEAR: ABNORMAL
DIFFERENTIAL METHOD: ABNORMAL
EOSINOPHIL NFR BLD: 2 % (ref 0–8)
ERYTHROCYTE [DISTWIDTH] IN BLOOD BY AUTOMATED COUNT: 16.5 % (ref 11.5–14.5)
EST. GFR  (NO RACE VARIABLE): 35.7 ML/MIN/1.73 M^2
EST. GFR  (NO RACE VARIABLE): 35.7 ML/MIN/1.73 M^2
GLUCOSE SERPL-MCNC: 132 MG/DL (ref 70–110)
GLUCOSE SERPL-MCNC: 150 MG/DL (ref 70–110)
GLUCOSE SERPL-MCNC: 171 MG/DL (ref 70–110)
HCT VFR BLD AUTO: 29.7 % (ref 37–48.5)
HCT VFR BLD CALC: 29 %PCV (ref 36–54)
HGB BLD-MCNC: 9.6 G/DL (ref 12–16)
HYPOCHROMIA BLD QL SMEAR: ABNORMAL
IMM GRANULOCYTES # BLD AUTO: ABNORMAL K/UL (ref 0–0.04)
IMM GRANULOCYTES NFR BLD AUTO: ABNORMAL % (ref 0–0.5)
LACTATE SERPL-SCNC: 1.3 MMOL/L (ref 0.5–2.2)
LYMPHOCYTES NFR BLD: 16 % (ref 18–48)
MAGNESIUM SERPL-MCNC: 2.1 MG/DL (ref 1.6–2.6)
MCH RBC QN AUTO: 28.5 PG (ref 27–31)
MCHC RBC AUTO-ENTMCNC: 32.3 G/DL (ref 32–36)
MCV RBC AUTO: 88 FL (ref 82–98)
MONOCYTES NFR BLD: 13 % (ref 4–15)
NEUTROPHILS NFR BLD: 68 % (ref 38–73)
NRBC BLD-RTO: 0 /100 WBC
OVALOCYTES BLD QL SMEAR: ABNORMAL
PLATELET # BLD AUTO: 221 K/UL (ref 150–450)
PLATELET BLD QL SMEAR: ABNORMAL
PMV BLD AUTO: 10.7 FL (ref 9.2–12.9)
POC IONIZED CALCIUM: 1.17 MMOL/L (ref 1.06–1.42)
POC TCO2 (MEASURED): 24 MMOL/L (ref 23–29)
POCT GLUCOSE: 132 MG/DL (ref 70–110)
POCT GLUCOSE: 187 MG/DL (ref 70–110)
POCT GLUCOSE: 189 MG/DL (ref 70–110)
POIKILOCYTOSIS BLD QL SMEAR: SLIGHT
POLYCHROMASIA BLD QL SMEAR: ABNORMAL
POTASSIUM BLD-SCNC: 5.8 MMOL/L (ref 3.5–5.1)
POTASSIUM SERPL-SCNC: 5.1 MMOL/L (ref 3.5–5.1)
POTASSIUM SERPL-SCNC: 5.3 MMOL/L (ref 3.5–5.1)
RBC # BLD AUTO: 3.37 M/UL (ref 4–5.4)
SAMPLE: ABNORMAL
SCHISTOCYTES BLD QL SMEAR: ABNORMAL
SODIUM BLD-SCNC: 138 MMOL/L (ref 136–145)
SODIUM SERPL-SCNC: 136 MMOL/L (ref 136–145)
SODIUM SERPL-SCNC: 137 MMOL/L (ref 136–145)
TARGETS BLD QL SMEAR: ABNORMAL
WBC # BLD AUTO: 5.03 K/UL (ref 3.9–12.7)

## 2023-01-05 PROCEDURE — 63600175 PHARM REV CODE 636 W HCPCS: Mod: HCNC | Performed by: PHYSICIAN ASSISTANT

## 2023-01-05 PROCEDURE — 25000242 PHARM REV CODE 250 ALT 637 W/ HCPCS: Mod: HCNC | Performed by: PHYSICIAN ASSISTANT

## 2023-01-05 PROCEDURE — 93005 ELECTROCARDIOGRAM TRACING: CPT | Mod: HCNC

## 2023-01-05 PROCEDURE — 63600175 PHARM REV CODE 636 W HCPCS: Mod: HCNC

## 2023-01-05 PROCEDURE — 93010 EKG 12-LEAD: ICD-10-PCS | Mod: HCNC,,, | Performed by: INTERNAL MEDICINE

## 2023-01-05 PROCEDURE — 36415 COLL VENOUS BLD VENIPUNCTURE: CPT | Mod: HCNC

## 2023-01-05 PROCEDURE — G0378 HOSPITAL OBSERVATION PER HR: HCPCS | Mod: HCNC

## 2023-01-05 PROCEDURE — 85007 BL SMEAR W/DIFF WBC COUNT: CPT | Mod: HCNC

## 2023-01-05 PROCEDURE — 85027 COMPLETE CBC AUTOMATED: CPT | Mod: HCNC

## 2023-01-05 PROCEDURE — 99223 PR INITIAL HOSPITAL CARE,LEVL III: ICD-10-PCS | Mod: HCNC,,, | Performed by: PHYSICIAN ASSISTANT

## 2023-01-05 PROCEDURE — 93010 ELECTROCARDIOGRAM REPORT: CPT | Mod: 76,HCNC,, | Performed by: INTERNAL MEDICINE

## 2023-01-05 PROCEDURE — 99900035 HC TECH TIME PER 15 MIN (STAT): Mod: HCNC

## 2023-01-05 PROCEDURE — 83735 ASSAY OF MAGNESIUM: CPT | Mod: HCNC

## 2023-01-05 PROCEDURE — 99223 1ST HOSP IP/OBS HIGH 75: CPT | Mod: HCNC,,, | Performed by: PHYSICIAN ASSISTANT

## 2023-01-05 PROCEDURE — 93010 ELECTROCARDIOGRAM REPORT: CPT | Mod: HCNC,,, | Performed by: INTERNAL MEDICINE

## 2023-01-05 PROCEDURE — 25000003 PHARM REV CODE 250: Mod: HCNC | Performed by: PHYSICIAN ASSISTANT

## 2023-01-05 PROCEDURE — 96372 THER/PROPH/DIAG INJ SC/IM: CPT | Performed by: PHYSICIAN ASSISTANT

## 2023-01-05 PROCEDURE — 83605 ASSAY OF LACTIC ACID: CPT | Mod: HCNC

## 2023-01-05 PROCEDURE — 36415 COLL VENOUS BLD VENIPUNCTURE: CPT | Mod: HCNC | Performed by: PHYSICIAN ASSISTANT

## 2023-01-05 PROCEDURE — 80048 BASIC METABOLIC PNL TOTAL CA: CPT | Mod: HCNC

## 2023-01-05 PROCEDURE — 94640 AIRWAY INHALATION TREATMENT: CPT | Mod: HCNC,XB

## 2023-01-05 PROCEDURE — 25000242 PHARM REV CODE 250 ALT 637 W/ HCPCS: Mod: HCNC

## 2023-01-05 PROCEDURE — 84132 ASSAY OF SERUM POTASSIUM: CPT | Mod: HCNC | Performed by: PHYSICIAN ASSISTANT

## 2023-01-05 PROCEDURE — 25000003 PHARM REV CODE 250: Mod: HCNC

## 2023-01-05 PROCEDURE — 94640 AIRWAY INHALATION TREATMENT: CPT | Mod: HCNC

## 2023-01-05 RX ORDER — FAMOTIDINE 20 MG/1
20 TABLET, FILM COATED ORAL DAILY
Status: DISCONTINUED | OUTPATIENT
Start: 2023-01-05 | End: 2023-01-11

## 2023-01-05 RX ORDER — IBUPROFEN 200 MG
24 TABLET ORAL
Status: DISCONTINUED | OUTPATIENT
Start: 2023-01-05 | End: 2023-01-24 | Stop reason: HOSPADM

## 2023-01-05 RX ORDER — POLYETHYLENE GLYCOL 3350 17 G/17G
17 POWDER, FOR SOLUTION ORAL DAILY PRN
Status: DISCONTINUED | OUTPATIENT
Start: 2023-01-05 | End: 2023-01-24 | Stop reason: HOSPADM

## 2023-01-05 RX ORDER — CETIRIZINE HYDROCHLORIDE 5 MG/1
5 TABLET ORAL NIGHTLY
Status: DISCONTINUED | OUTPATIENT
Start: 2023-01-05 | End: 2023-01-09

## 2023-01-05 RX ORDER — ACETAMINOPHEN 325 MG/1
650 TABLET ORAL EVERY 4 HOURS PRN
Status: DISCONTINUED | OUTPATIENT
Start: 2023-01-05 | End: 2023-01-15

## 2023-01-05 RX ORDER — DOXYCYCLINE HYCLATE 100 MG
100 TABLET ORAL
Status: DISCONTINUED | OUTPATIENT
Start: 2023-01-05 | End: 2023-01-05

## 2023-01-05 RX ORDER — ALBUTEROL SULFATE 2.5 MG/.5ML
10 SOLUTION RESPIRATORY (INHALATION) ONCE
Status: COMPLETED | OUTPATIENT
Start: 2023-01-05 | End: 2023-01-05

## 2023-01-05 RX ORDER — ENOXAPARIN SODIUM 100 MG/ML
30 INJECTION SUBCUTANEOUS EVERY 24 HOURS
Status: DISCONTINUED | OUTPATIENT
Start: 2023-01-05 | End: 2023-01-13

## 2023-01-05 RX ORDER — GLUCAGON 1 MG
1 KIT INJECTION
Status: DISCONTINUED | OUTPATIENT
Start: 2023-01-05 | End: 2023-01-24 | Stop reason: HOSPADM

## 2023-01-05 RX ORDER — IPRATROPIUM BROMIDE AND ALBUTEROL SULFATE 2.5; .5 MG/3ML; MG/3ML
3 SOLUTION RESPIRATORY (INHALATION) EVERY 4 HOURS PRN
Status: DISCONTINUED | OUTPATIENT
Start: 2023-01-05 | End: 2023-01-24 | Stop reason: HOSPADM

## 2023-01-05 RX ORDER — MORPHINE SULFATE 2 MG/ML
2 INJECTION, SOLUTION INTRAMUSCULAR; INTRAVENOUS
Status: COMPLETED | OUTPATIENT
Start: 2023-01-05 | End: 2023-01-05

## 2023-01-05 RX ORDER — IBUPROFEN 200 MG
16 TABLET ORAL
Status: DISCONTINUED | OUTPATIENT
Start: 2023-01-05 | End: 2023-01-24 | Stop reason: HOSPADM

## 2023-01-05 RX ORDER — ONDANSETRON 2 MG/ML
4 INJECTION INTRAMUSCULAR; INTRAVENOUS
Status: COMPLETED | OUTPATIENT
Start: 2023-01-05 | End: 2023-01-05

## 2023-01-05 RX ORDER — ALBUTEROL SULFATE 2.5 MG/.5ML
10 SOLUTION RESPIRATORY (INHALATION) EVERY 6 HOURS
Status: DISCONTINUED | OUTPATIENT
Start: 2023-01-06 | End: 2023-01-06

## 2023-01-05 RX ORDER — PROMETHAZINE HYDROCHLORIDE 25 MG/1
25 TABLET ORAL EVERY 6 HOURS PRN
Status: DISCONTINUED | OUTPATIENT
Start: 2023-01-05 | End: 2023-01-09

## 2023-01-05 RX ORDER — ACETAMINOPHEN 500 MG
1000 TABLET ORAL EVERY 8 HOURS PRN
Status: DISCONTINUED | OUTPATIENT
Start: 2023-01-05 | End: 2023-01-06

## 2023-01-05 RX ORDER — BISACODYL 10 MG
10 SUPPOSITORY, RECTAL RECTAL DAILY PRN
Status: DISCONTINUED | OUTPATIENT
Start: 2023-01-05 | End: 2023-01-24 | Stop reason: HOSPADM

## 2023-01-05 RX ORDER — TALC
6 POWDER (GRAM) TOPICAL NIGHTLY PRN
Status: DISCONTINUED | OUTPATIENT
Start: 2023-01-05 | End: 2023-01-24 | Stop reason: HOSPADM

## 2023-01-05 RX ORDER — PRAVASTATIN SODIUM 40 MG/1
40 TABLET ORAL DAILY
Status: DISCONTINUED | OUTPATIENT
Start: 2023-01-05 | End: 2023-01-24 | Stop reason: HOSPADM

## 2023-01-05 RX ORDER — OXYCODONE HYDROCHLORIDE 5 MG/1
5 TABLET ORAL EVERY 6 HOURS PRN
Status: DISCONTINUED | OUTPATIENT
Start: 2023-01-05 | End: 2023-01-09

## 2023-01-05 RX ORDER — ASPIRIN 81 MG/1
81 TABLET ORAL DAILY
Status: DISCONTINUED | OUTPATIENT
Start: 2023-01-05 | End: 2023-01-24 | Stop reason: HOSPADM

## 2023-01-05 RX ORDER — ONDANSETRON 8 MG/1
8 TABLET, ORALLY DISINTEGRATING ORAL EVERY 8 HOURS PRN
Status: DISCONTINUED | OUTPATIENT
Start: 2023-01-05 | End: 2023-01-24 | Stop reason: HOSPADM

## 2023-01-05 RX ADMIN — ACETAMINOPHEN 1000 MG: 500 TABLET ORAL at 03:01

## 2023-01-05 RX ADMIN — ASPIRIN 81 MG: 81 TABLET, COATED ORAL at 08:01

## 2023-01-05 RX ADMIN — SODIUM ZIRCONIUM CYCLOSILICATE 10 G: 10 POWDER, FOR SUSPENSION ORAL at 03:01

## 2023-01-05 RX ADMIN — ONDANSETRON 4 MG: 2 INJECTION INTRAMUSCULAR; INTRAVENOUS at 12:01

## 2023-01-05 RX ADMIN — ENOXAPARIN SODIUM 30 MG: 30 INJECTION SUBCUTANEOUS at 04:01

## 2023-01-05 RX ADMIN — ALBUTEROL SULFATE 10 MG: 2.5 SOLUTION RESPIRATORY (INHALATION) at 01:01

## 2023-01-05 RX ADMIN — PRAVASTATIN SODIUM 40 MG: 40 TABLET ORAL at 08:01

## 2023-01-05 RX ADMIN — CETIRIZINE HYDROCHLORIDE 5 MG: 5 TABLET, FILM COATED ORAL at 08:01

## 2023-01-05 RX ADMIN — OXYCODONE 5 MG: 5 TABLET ORAL at 08:01

## 2023-01-05 RX ADMIN — VANCOMYCIN HYDROCHLORIDE 1500 MG: 1.5 INJECTION, POWDER, LYOPHILIZED, FOR SOLUTION INTRAVENOUS at 04:01

## 2023-01-05 RX ADMIN — SODIUM ZIRCONIUM CYCLOSILICATE 10 G: 10 POWDER, FOR SUSPENSION ORAL at 09:01

## 2023-01-05 RX ADMIN — CETIRIZINE HYDROCHLORIDE 5 MG: 5 TABLET, FILM COATED ORAL at 03:01

## 2023-01-05 RX ADMIN — MORPHINE SULFATE 2 MG: 2 INJECTION, SOLUTION INTRAMUSCULAR; INTRAVENOUS at 12:01

## 2023-01-05 RX ADMIN — FAMOTIDINE 20 MG: 20 TABLET ORAL at 08:01

## 2023-01-05 RX ADMIN — SODIUM CHLORIDE 500 ML: 9 INJECTION, SOLUTION INTRAVENOUS at 03:01

## 2023-01-05 RX ADMIN — ALBUTEROL SULFATE 10 MG: 2.5 SOLUTION RESPIRATORY (INHALATION) at 10:01

## 2023-01-05 NOTE — ASSESSMENT & PLAN NOTE
- Cr 1.5, baseline ~1.2  - difficult determine volume status based on physical exam alone  - BNP and CXR pending-- decision for IVFs vs lasix pending results  - check UA  - avoid nephrotoxins, renally dose meds  - trend BMP

## 2023-01-05 NOTE — PROGRESS NOTES
"Pharmacokinetic Initial Assessment & Plan: IV Vancomycin    Initiate intravenous vancomycin with loading dose of 1500 mg once   Subsequent doses based on random vancomycin levels.  Obtain a Vanc random on 01/06 @ 1800  Desired empiric serum trough concentration is 10 to 20 mcg/mL    Pharmacy will continue to follow and monitor vancomycin. [id number] with any questions regarding this assessment.     Thank you for the consult,   Matt Marin       Patient brief summary:  Jenniffer Jimenez is a 89 y.o. female initiated on antimicrobial therapy with IV Vancomycin for treatment of suspected skin & soft tissue infection    Drug Allergies:   Review of patient's allergies indicates:   Allergen Reactions    Opioids - morphine analogues Other (See Comments)     Bowel issues; bowel obstruction    Tizanidine Other (See Comments)     "Lips were numb,  Almost passed out."    Tramadol Hallucinations    Beta-blockers (beta-adrenergic blocking agts) Other (See Comments)     Can not go on beta blockers for long period of time - due to taking allergy injections    Morphine     Opioids-meperidine and related     Ciprofloxacin Rash       Actual Body Weight:   76.7 kg    Renal Function:   Estimated Creatinine Clearance: 25.5 mL/min (A) (based on SCr of 1.5 mg/dL (H)).,     CBC (last 72 hours):  Recent Labs   Lab Result Units 01/04/23  2308   WBC K/uL 7.29   Hemoglobin g/dL 10.4*   Hematocrit % 31.4*   Platelets K/uL 252   Gran % % 76.0*   Lymph % % 10.0*   Mono % % 9.0   Eosinophil % % 2.0   Basophil % % 3.0*   Differential Method  Manual       Metabolic Panel (last 72 hours):  Recent Labs   Lab Result Units 01/04/23  2308   Sodium mmol/L 137   Potassium mmol/L 5.9*   Chloride mmol/L 108   CO2 mmol/L 20*   Glucose mg/dL 127*   BUN mg/dL 48*   Creatinine mg/dL 1.5*       Drug levels (last 3 results):  No results for input(s): VANCOMYCINRA, VANCORANDOM, VANCOMYCINPE, VANCOPEAK, VANCOMYCINTR, VANCOTROUGH in the last 72 " hours.    Microbiologic Results:  Microbiology Results (last 7 days)       ** No results found for the last 168 hours. **

## 2023-01-05 NOTE — DISCHARGE INSTRUCTIONS
Diagnosis: Cellulitis     Cellulitis is a skin infection.     Tests today showed:   Labs Reviewed   CBC W/ AUTO DIFFERENTIAL   BASIC METABOLIC PANEL   C-REACTIVE PROTEIN   SEDIMENTATION RATE     Imaging Results    None         Treatments you had today:   Medications   doxycycline tablet 100 mg (has no administration in time range)       Home Care Instructions:  - Take the prescribed antibiotic as directed.   - Continue taking your home medications as prescribed    Follow-up plan:  · Follow-up with: Primary care doctor within 3 - 5  days  · Follow-up for additional testing and/or evaluation as directed by your primary doctor    Return to the emergency department if you develop significant pain or swelling at the site of your cellulitis infection, if you have fevers, nausea, vomiting, or diarrhea, or if the redness of your skin is moving beyond the area where it is red today. These could be signs of worsening infection requiring further medical care.     If your infection was outlined today, return to the emergency department if the redness extends beyond the area outlined by the pen.

## 2023-01-05 NOTE — NURSING
Unable to assess orientation. Pt eyes are close laying supine. No distress noted. Bed in lowest position.Telemetry maintained. Bed alarm activated. Purewick maintained. Side rails up x2. Call bell and personal belongs within reach.  Safety precautions maintained.No further issues or concerns at this time. Plan of care continues.

## 2023-01-05 NOTE — ASSESSMENT & PLAN NOTE
- appears euvoluemic but complains of occasional SOB c/f volume overload  - BNP, CXR pending  - if normal results, will hold lasix and give IVFs  - if abnormal results, will give IV lasix   - strict I/Os, daily weights

## 2023-01-05 NOTE — PLAN OF CARE
Pt progressing toward goals. No distress noted. No falls or injuries during shift. Pt bed in lowest position. Side rails x2. Bed alarm activated. Call bell and personal belongs within reach. Telemetry maintained. Safety precautions maintained.      Problem: Adult Inpatient Plan of Care  Goal: Plan of Care Review  Outcome: Ongoing, Progressing  Goal: Patient-Specific Goal (Individualized)  Outcome: Ongoing, Progressing  Goal: Absence of Hospital-Acquired Illness or Injury  Outcome: Ongoing, Progressing  Goal: Optimal Comfort and Wellbeing  Outcome: Ongoing, Progressing  Goal: Readiness for Transition of Care  Outcome: Ongoing, Progressing     Problem: Infection  Goal: Absence of Infection Signs and Symptoms  Outcome: Ongoing, Progressing     Problem: Diabetes Comorbidity  Goal: Blood Glucose Level Within Targeted Range  Outcome: Ongoing, Progressing     Problem: Fluid and Electrolyte Imbalance (Acute Kidney Injury/Impairment)  Goal: Fluid and Electrolyte Balance  Outcome: Ongoing, Progressing     Problem: Oral Intake Inadequate (Acute Kidney Injury/Impairment)  Goal: Optimal Nutrition Intake  Outcome: Ongoing, Progressing     Problem: Renal Function Impairment (Acute Kidney Injury/Impairment)  Goal: Effective Renal Function  Outcome: Ongoing, Progressing     Problem: Impaired Wound Healing  Goal: Optimal Wound Healing  Outcome: Ongoing, Progressing     Problem: Fall Injury Risk  Goal: Absence of Fall and Fall-Related Injury  Outcome: Ongoing, Progressing

## 2023-01-05 NOTE — SUBJECTIVE & OBJECTIVE
"Past Medical History:   Diagnosis Date    Amblyopia of left eye 4/10/2013    Anxiety     Arthritis     Facet arthropathy, Lumbosacral    Cataract     Central retinal vein occlusion of left eye     Depression     Diabetic polyneuropathy 2022    Exotropia of both eyes 2013    recession RSR 5.0mm w/ adj; recession LR os 5.0 w/ adj; resect MR os  4.0mm    Hearing loss     History of resection of small bowel     Hypertensive retinopathy of both eyes     Hypoglycemia     Macular degeneration     OA (osteoarthritis) of shoulder     Right    Osteoporosis     Posterior vitreous detachment of both eyes     Rhinitis     TIA (transient ischemic attack)        Past Surgical History:   Procedure Laterality Date    APPENDECTOMY      CARDIAC CATHETERIZATION      CATARACT EXTRACTION W/  INTRAOCULAR LENS IMPLANT Bilateral      SECTION, CLASSIC      CLOSURE OF LEFT ATRIAL APPENDAGE USING DEVICE N/A 2020    Procedure: Left atrial appendage closure device;  Surgeon: Abundio Curtis MD;  Location: [facility] CATH LAB;  Service: Cardiology;  Laterality: N/A;    HYSTERECTOMY      INNER EAR SURGERY      JOINT REPLACEMENT      LEFT KNEE REPLACEMENT IN  -    OOPHORECTOMY      SINUS SURGERY      STRABISMUS SURGERY  13    RSR recession 5 mm, LLR recession 5 mm and LMR resection 4mm    STRABISMUS SURGERY  2014    recess LR OD 6mm    TONSILLECTOMY      watchman surgery N/A 2020       Review of patient's allergies indicates:   Allergen Reactions    Opioids - morphine analogues Other (See Comments)     Bowel issues; bowel obstruction    Tizanidine Other (See Comments)     "Lips were numb,  Almost passed out."    Tramadol Hallucinations    Beta-blockers (beta-adrenergic blocking agts) Other (See Comments)     Can not go on beta blockers for long period of time - due to taking allergy injections    Morphine     Opioids-meperidine and related     Ciprofloxacin Rash       No current facility-administered medications on " file prior to encounter.     Current Outpatient Medications on File Prior to Encounter   Medication Sig    acetaminophen (TYLENOL) 500 MG tablet Take 1,000 mg by mouth 2 (two) times a day.    aspirin (ECOTRIN) 81 MG EC tablet Take 1 tablet (81 mg total) by mouth once daily.    azelastine (ASTELIN) 137 mcg (0.1 %) nasal spray 1 spray (137 mcg total) by Nasal route 2 (two) times daily.    clotrimazole (LOTRIMIN) 1 % cream Apply topically 2 (two) times daily. Apply up to 4 weeks to affected area    diclofenac sodium (VOLTAREN) 1 % Gel Apply 2 g topically 4 (four) times daily. Use gloves to apply for 10 days    famotidine (PEPCID) 20 MG tablet Take 1 tablet (20 mg total) by mouth 2 (two) times daily. For Allergic Reaction.    fluticasone propionate (FLONASE) 50 mcg/actuation nasal spray USE 1 SPRAY IN EACH NOSTRIL ONE TIME DAILY    furosemide (LASIX) 20 MG tablet Take 1 tablet (20 mg total) by mouth 2 (two) times daily before meals. Take twice a day for Weight greater than 160 lb, once a day for weights less than 160 lb    gentamicin (GARAMYCIN) 0.1 % cream APPLY TOPICALLY TO THE AFFECTED AREA EVERY DAY AS NEEDED AS DIRECTED    guaiFENesin 100 mg/5 ml (ROBITUSSIN) 100 mg/5 mL syrup Take 15 mLs by mouth once daily.    levocetirizine (XYZAL) 5 MG tablet Take 1 tablet (5 mg total) by mouth once daily. For Allergies.    LIDOcaine (LIDODERM) 5 % Place 1 patch onto the skin once daily. Remove & Discard patch within 12 hours or as directed by provider    miconazole nitrate 2% (MICOTIN) 2 % Oint Apply topically every other day.    mupirocin (BACTROBAN) 2 % ointment Apply topically 3 (three) times daily. Apply locally every other day    pravastatin (PRAVACHOL) 40 MG tablet Take 1 tablet (40 mg total) by mouth once daily.    spironolactone (ALDACTONE) 25 MG tablet Take twice a day for Weight greater than 160 lb, once a day for weights less than 160 lb    vit C/E/Zn/coppr/lutein/zeaxan (OCUVITE LUTEIN AND ZEAXANTHIN ORAL) Take 1  capsule by mouth once daily. Lutien 25 mg, Zeaxanthin 5 mg    vitamin E 400 UNIT capsule Take 400 Units by mouth once daily.     Family History       Problem Relation (Age of Onset)    Breast cancer Maternal Aunt    Diabetes Sister, Brother    Heart disease Sister, Sister    Hypertension Mother, Father    Liver disease Sister    No Known Problems Maternal Uncle, Paternal Aunt, Paternal Uncle, Maternal Grandmother, Maternal Grandfather, Paternal Grandmother, Paternal Grandfather, Daughter, Son, Son, Son          Tobacco Use    Smoking status: Former     Types: Cigarettes     Quit date: 10/29/1982     Years since quittin.2     Passive exposure: Never    Smokeless tobacco: Never   Substance and Sexual Activity    Alcohol use: Yes     Alcohol/week: 2.0 standard drinks     Types: 2 Shots of liquor per week     Comment: rare    Drug use: No    Sexual activity: Never     Review of Systems   Constitutional:  Negative for appetite change, chills and fever.   HENT:  Negative for congestion, trouble swallowing and voice change.    Eyes:  Negative for photophobia and visual disturbance.   Respiratory:  Positive for shortness of breath. Negative for cough, chest tightness and wheezing.    Cardiovascular:  Positive for leg swelling. Negative for chest pain and palpitations.   Gastrointestinal:  Negative for abdominal pain, constipation, diarrhea, nausea and vomiting.   Endocrine: Negative for polyphagia and polyuria.   Genitourinary:  Negative for decreased urine volume, difficulty urinating, dysuria, flank pain and hematuria.   Musculoskeletal:  Negative for arthralgias, back pain and gait problem.   Skin:  Positive for color change and wound.   Neurological:  Negative for dizziness, seizures, syncope, weakness, numbness and headaches.   Psychiatric/Behavioral:  Negative for agitation, behavioral problems and confusion.    Objective:     Vital Signs (Most Recent):  Temp: 98.1 °F (36.7 °C) (23 2150)  Pulse: 76  (01/04/23 2216)  Resp: 20 (01/05/23 0037)  BP: (!) 140/60 (01/04/23 2216)  SpO2: 99 % (01/04/23 2216)   Vital Signs (24h Range):  Temp:  [98.1 °F (36.7 °C)-98.6 °F (37 °C)] 98.1 °F (36.7 °C)  Pulse:  [76-83] 76  Resp:  [16-22] 20  SpO2:  [97 %-99 %] 99 %  BP: (140-142)/(60-83) 140/60     Weight: 76.7 kg (169 lb)  Body mass index is 28.12 kg/m².    Physical Exam  Vitals and nursing note reviewed.   Constitutional:       General: She is not in acute distress.     Appearance: She is well-developed.   HENT:      Head: Normocephalic and atraumatic.      Mouth/Throat:      Pharynx: No oropharyngeal exudate.   Eyes:      Extraocular Movements: Extraocular movements intact.      Conjunctiva/sclera: Conjunctivae normal.   Cardiovascular:      Rate and Rhythm: Normal rate and regular rhythm.      Heart sounds: Normal heart sounds.   Pulmonary:      Effort: Pulmonary effort is normal. No respiratory distress.      Breath sounds: No wheezing.      Comments: Slight diminished BS to BLL  Abdominal:      General: Bowel sounds are normal. There is no distension.      Palpations: Abdomen is soft.      Tenderness: There is no abdominal tenderness.   Musculoskeletal:         General: Swelling and tenderness present. Normal range of motion.      Cervical back: Normal range of motion and neck supple.   Lymphadenopathy:      Cervical: No cervical adenopathy.   Skin:     General: Skin is warm and dry.      Capillary Refill: Capillary refill takes less than 2 seconds.      Findings: Erythema present. No rash.      Comments: Exam limited 2/2 ace bandage in place. Erythema and wound to RLE. Good pulses and perfusion.    Neurological:      Mental Status: She is alert and oriented to person, place, and time.      Cranial Nerves: No cranial nerve deficit.      Sensory: No sensory deficit.      Coordination: Coordination normal.   Psychiatric:         Behavior: Behavior normal.         Thought Content: Thought content normal.         Judgment:  Judgment normal.           Significant Labs: All pertinent labs within the past 24 hours have been reviewed.  BMP:   Recent Labs   Lab 01/04/23 2308   *      K 5.9*      CO2 20*   BUN 48*   CREATININE 1.5*   CALCIUM 9.1     CBC:   Recent Labs   Lab 01/04/23 2308 01/05/23  0050   WBC 7.29  --    HGB 10.4*  --    HCT 31.4* 29*     --        Significant Imaging: I have reviewed all pertinent imaging results/findings within the past 24 hours.

## 2023-01-05 NOTE — ASSESSMENT & PLAN NOTE
Stage 3b chronic kidney disease  - K 5.9, EKG pending  - likely 2/2 JOSHUA  - shift with albuterol neb and lokelma  - IVFs vs lasix as above  - monitor on tele

## 2023-01-05 NOTE — ED PROVIDER NOTES
"Encounter Date: 1/4/2023       History     Chief Complaint   Patient presents with    Wound Check     To right leg, was told by her nurses to come to ed to get checks, needs antibiotics, onset July 89 y/o F w/ h/o DM, HTN, chronic cellulitis of her right lower extremity is presenting to the emergency department for worsening pain of her right lower extremity. Patient states that she was instructed to come to the emergency department today by her wound care nurse for evaluation due to worsening redness and increased fluid loss from the lower extremity.  Patient is a difficult historian and she is here with daughter who has limited information regarding her recent doctor's visits.  Apparently patient was supposed to be on ciprofloxacin but had a bad reaction and has been off of it for the past 2 weeks.  The patient and has not had an alternative antibiotic prescribed but reportedly a supposed to be on an antibiotic.  She denies any fever, nausea, vomiting.  Again patient is hard of hearing and a difficult historian.     The history is provided by the patient. No  was used.   Review of patient's allergies indicates:   Allergen Reactions    Opioids - morphine analogues Other (See Comments)     Bowel issues; bowel obstruction    Tizanidine Other (See Comments)     "Lips were numb,  Almost passed out."    Tramadol Hallucinations    Beta-blockers (beta-adrenergic blocking agts) Other (See Comments)     Can not go on beta blockers for long period of time - due to taking allergy injections    Morphine     Opioids-meperidine and related     Ciprofloxacin Rash     Past Medical History:   Diagnosis Date    Amblyopia of left eye 4/10/2013    Anxiety     Arthritis     Facet arthropathy, Lumbosacral    Cataract     Central retinal vein occlusion of left eye     Depression     Diabetic polyneuropathy 1/6/2022    Exotropia of both eyes 2/6/2013    recession RSR 5.0mm w/ adj; recession LR os 5.0 w/ adj; resect " MR os  4.0mm    Hearing loss     History of resection of small bowel     Hypertensive retinopathy of both eyes     Hypoglycemia     Macular degeneration     OA (osteoarthritis) of shoulder     Right    Osteoporosis     Posterior vitreous detachment of both eyes     Rhinitis     TIA (transient ischemic attack)      Past Surgical History:   Procedure Laterality Date    APPENDECTOMY      CARDIAC CATHETERIZATION      CATARACT EXTRACTION W/  INTRAOCULAR LENS IMPLANT Bilateral      SECTION, CLASSIC      CLOSURE OF LEFT ATRIAL APPENDAGE USING DEVICE N/A 2020    Procedure: Left atrial appendage closure device;  Surgeon: Abundio Curtis MD;  Location: [facility] CATH LAB;  Service: Cardiology;  Laterality: N/A;    HYSTERECTOMY      INNER EAR SURGERY      JOINT REPLACEMENT      LEFT KNEE REPLACEMENT IN  -    OOPHORECTOMY      SINUS SURGERY      STRABISMUS SURGERY  13    RSR recession 5 mm, LLR recession 5 mm and LMR resection 4mm    STRABISMUS SURGERY  2014    recess LR OD 6mm    TONSILLECTOMY      watchman surgery N/A 2020     Family History   Problem Relation Age of Onset    Hypertension Mother     Hypertension Father     Liver disease Sister     Diabetes Sister     Heart disease Sister     Diabetes Brother     Breast cancer Maternal Aunt     No Known Problems Maternal Uncle     No Known Problems Paternal Aunt     No Known Problems Paternal Uncle     No Known Problems Maternal Grandmother     No Known Problems Maternal Grandfather     No Known Problems Paternal Grandmother     No Known Problems Paternal Grandfather     No Known Problems Daughter     No Known Problems Son     Heart disease Sister     No Known Problems Son     No Known Problems Son     Cancer Neg Hx         in first degree relatives    Melanoma Neg Hx     Psoriasis Neg Hx     Lupus Neg Hx     Amblyopia Neg Hx     Blindness Neg Hx     Cataracts Neg Hx     Glaucoma Neg Hx     Macular degeneration Neg Hx     Retinal detachment Neg Hx      Strabismus Neg Hx     Stroke Neg Hx     Thyroid disease Neg Hx      Social History     Tobacco Use    Smoking status: Former     Types: Cigarettes     Quit date: 10/29/1982     Years since quittin.2     Passive exposure: Never    Smokeless tobacco: Never   Substance Use Topics    Alcohol use: Yes     Alcohol/week: 2.0 standard drinks     Types: 2 Shots of liquor per week     Comment: rare    Drug use: No     Review of Systems   Constitutional:  Negative for chills and fever.   HENT:  Negative for rhinorrhea and sore throat.    Respiratory:  Negative for cough and shortness of breath.    Cardiovascular:  Negative for chest pain and leg swelling.   Gastrointestinal:  Negative for abdominal pain, diarrhea, nausea and vomiting.   Genitourinary:  Negative for dysuria and hematuria.   Musculoskeletal:  Negative for back pain and neck pain.   Skin:  Positive for wound. Negative for rash.   Neurological:  Negative for seizures, weakness and headaches.   Hematological:  Does not bruise/bleed easily.   Psychiatric/Behavioral:  Negative for agitation and behavioral problems.      Physical Exam     Initial Vitals [23 1851]   BP Pulse Resp Temp SpO2   (!) 142/83 83 18 98.6 °F (37 °C) 99 %      MAP       --         Physical Exam    Nursing note and vitals reviewed.  Constitutional: She appears well-developed and well-nourished.   Hard of hearing, pleasant   HENT:   Head: Normocephalic and atraumatic.   Eyes: Conjunctivae and EOM are normal.   Neck: Neck supple.   Normal range of motion.  Cardiovascular:  Normal rate, regular rhythm, normal heart sounds and intact distal pulses.           Pulmonary/Chest: No respiratory distress. She has no wheezes. She has no rhonchi. She has no rales.   Abdominal: Abdomen is soft. Bowel sounds are normal. She exhibits no distension. There is no abdominal tenderness. There is no rebound and no guarding.   Musculoskeletal:         General: No tenderness or edema. Normal range of  motion.      Cervical back: Normal range of motion and neck supple.     Neurological: She is alert.   Moving all extremities   Skin: Skin is warm and dry.   Psychiatric: She has a normal mood and affect. Thought content normal.                 ED Course   Procedures  Labs Reviewed   CBC W/ AUTO DIFFERENTIAL - Abnormal; Notable for the following components:       Result Value    RBC 3.58 (*)     Hemoglobin 10.4 (*)     Hematocrit 31.4 (*)     RDW 16.5 (*)     Gran % 76.0 (*)     Lymph % 10.0 (*)     Basophil % 3.0 (*)     All other components within normal limits   BASIC METABOLIC PANEL - Abnormal; Notable for the following components:    Potassium 5.9 (*)     CO2 20 (*)     Glucose 127 (*)     BUN 48 (*)     Creatinine 1.5 (*)     eGFR 32.9 (*)     All other components within normal limits   C-REACTIVE PROTEIN - Abnormal; Notable for the following components:    CRP 85.2 (*)     All other components within normal limits   SEDIMENTATION RATE - Abnormal; Notable for the following components:    Sed Rate 96 (*)     All other components within normal limits   ISTAT PROCEDURE - Abnormal; Notable for the following components:    POC Glucose 132 (*)     POC BUN 44 (*)     POC Creatinine 1.5 (*)     POC Potassium 5.8 (*)     POC Hematocrit 29 (*)     All other components within normal limits   B-TYPE NATRIURETIC PEPTIDE   URINALYSIS, REFLEX TO URINE CULTURE   HEMOGLOBIN A1C   BASIC METABOLIC PANEL   MAGNESIUM   PHOSPHORUS   CBC W/ AUTO DIFFERENTIAL   ISTAT CHEM8   POCT GLUCOSE MONITORING CONTINUOUS     EKG Readings: (Independently Interpreted)   Initial Reading: No STEMI. Previous EKG: Compared with most recent EKG Rhythm: Atrial Fibrillation. Heart Rate: 76. ST Segments: Normal ST Segments. T Waves: Normal.     Imaging Results              X-Ray Chest AP Portable (Final result)  Result time 01/05/23 01:39:27      Final result by Tye Ferris MD (01/05/23 01:39:27)                   Impression:      Cardiomegaly and  "coarsened interstitial lung markings.  No confluent area of consolidation or detrimental change when compared with 09/25/2022.      Electronically signed by: Tye Ferris MD  Date:    01/05/2023  Time:    01:39               Narrative:    EXAMINATION:  XR CHEST AP PORTABLE    CLINICAL HISTORY:  Provided history is "SOB;  ".    TECHNIQUE:  One view of the chest.    COMPARISON:  09/25/2022.    FINDINGS:  Cardiac wires overlie the chest.  Cardiomediastinal silhouette is mildly enlarged and similar to the prior study.  Atherosclerotic calcifications overlie the aortic arch.  Stable elevation of the left hemidiaphragm.  Coarsened bilateral interstitial lung markings but no confluent area of consolidation.  No large pleural effusion.  No distinct pneumothorax.  Postoperative changes in the left humerus.                                       Medications   vancomycin - pharmacy to dose ( Intravenous Random Level Due 1/6/23 1800)   albuterol-ipratropium 2.5 mg-0.5 mg/3 mL nebulizer solution 3 mL (has no administration in time range)   melatonin tablet 6 mg (has no administration in time range)   ondansetron disintegrating tablet 8 mg (has no administration in time range)   promethazine tablet 25 mg (has no administration in time range)   polyethylene glycol packet 17 g (has no administration in time range)   bisacodyL suppository 10 mg (has no administration in time range)   acetaminophen tablet 650 mg (has no administration in time range)   glucose chewable tablet 16 g (has no administration in time range)   glucose chewable tablet 24 g (has no administration in time range)   glucagon (human recombinant) injection 1 mg (has no administration in time range)   dextrose 10% bolus 125 mL 125 mL (has no administration in time range)   dextrose 10% bolus 250 mL 250 mL (has no administration in time range)   enoxaparin injection 30 mg (has no administration in time range)   acetaminophen tablet 1,000 mg (has no administration " in time range)   oxyCODONE immediate release tablet 5 mg (has no administration in time range)   vancomycin 1,500 mg in dextrose 5 % 250 mL IVPB (has no administration in time range)   sodium zirconium cyclosilicate packet 10 g (has no administration in time range)   aspirin EC tablet 81 mg (has no administration in time range)   famotidine tablet 20 mg (has no administration in time range)   cetirizine tablet 5 mg (has no administration in time range)   pravastatin tablet 40 mg (has no administration in time range)   morphine injection 2 mg (2 mg Intravenous Given 1/5/23 0037)   ondansetron injection 4 mg (4 mg Intravenous Given 1/5/23 0041)   albuterol sulfate nebulizer solution 10 mg (10 mg Nebulization Given 1/5/23 0141)     Medical Decision Making:   Initial Assessment:   89 y/o F w/ h/o DM, HTN, chronic cellulitis of her right lower extremity is presenting to the emergency department for worsening pain of her right lower extremity in the setting of not having ciprofloxacin for approximately 2 weeks.  Patient is followed by wound care and was seen by nurse earlier today who told her come to the emergency department for evaluation.  Patient has not been taking ciprofloxacin because of rash suspected to be adverse reaction to this.  Patient presents with normal vital signs.  She is afebrile and nontoxic appearing.  Differential Diagnosis:   Cellulitis, erysipelas, abscess, lymphangitis, dermatitis  Clinical Tests:   Lab Tests: Ordered and Reviewed  ED Management:  No leukocytosis.  Elevated inflammatory markers.  Worsening kidney function.  Potassium slightly elevated without any EKG changes.  Treated in the emergency department with antibiotics and lower extremity redressed.  Decision to resume oral antibiotics instead of escalating to IV was made because patient did not fail outpatient oral antibiotics she just discontinued them.  Plan to bring into the hospital for continued monitoring and treatment.           Attending Attestation:   Physician Attestation Statement for Resident:  As the supervising MD   Physician Attestation Statement: I have personally seen and examined this patient.   I agree with the above history.  -:   As the supervising MD I agree with the above PE.     As the supervising MD I agree with the above treatment, course, plan, and disposition.    I have reviewed and agree with the residents interpretation of the following: lab data.  I have reviewed the following: old records at this facility.              ED Course as of 01/05/23 0246   Thu Jan 05, 2023   0000 Sed Rate(!): 96 [KL]   0000 CRP(!): 85.2 [KL]   0000 WBC: 7.29 [KL]   0000 Potassium(!): 5.9  Suspect 2/2 hemolysis. Will recheck istat.  [KL]   0001 BUN(!): 48 [KL]   0001 Creatinine(!): 1.5 [KL]      ED Course User Index  [KL] Dilcia Davidson MD                 Clinical Impression:   Final diagnoses:  [L03.90] Cellulitis, unspecified cellulitis site (Primary)  [Z51.89] Visit for wound check  [E87.5] Hyperkalemia  [N17.9] JOSHUA (acute kidney injury)        ED Disposition Condition    Observation Stable                Dilcia Davidson MD  Resident  01/05/23 0246       Suzy Mark MD  01/05/23 0301

## 2023-01-05 NOTE — HPI
Jenniffer Jimenez is a 89 y.o. female with a PMHx of HLD, HTN, AF s/p watchman, chronic cellulitis/ wound of her right lower extremity who presents to [facility] for evaluation of worsening redness and pain to her right lwoer extremity. Patient is a poor historian and has difficulty hearing. Per ED note, patient receives wound care for chronic RLE cellulitis. Wound care nuse noted worsening redness, weeping, and pain to her RLE. Apparently patient was supposed to be on ciprofloxacin but had a bad reaction so she has been off of it for the past 2 weeks.  The patient and has not had an alternative antibiotic prescribed but reportedly is supposed to be on an antibiotic. Patient also complains of intermittent shortness of breath recently which mostly occurs on exertion. Denies any fever, nausea, vomiting, chest pain, numbness/tingling, weakness, slurred speech, or recent falls.     ED: AFVSS. No leukocytosis. K 5.9, Cr 1.5 (BL ~1.2). EKG, CXR pending. Given IV vanc.

## 2023-01-05 NOTE — PLAN OF CARE
[facility] - Observation 11H  Initial Discharge Assessment       Primary Care Provider: Rubi Young DO    Admission Diagnosis: Hyperkalemia [E87.5]  JOSHUA (acute kidney injury) [N17.9]  Chest pain [R07.9]  Visit for wound check [Z51.89]  Cellulitis, unspecified cellulitis site [L03.90]    Admission Date: 1/4/2023  Expected Discharge Date: 1/7/2023    CM met with patient at bedside for Discharge Planning Assessment.  Per pt, patient lives with alone in a apt  with 0 step(s) to enter.   Per pt, needs assistance with ADLs and used rollator and cane for ambulation prior to admit.  Patient will have assistance from home health upon discharge.  Patient does not attend a coumadin clinic and is not on dialysis.     Discharge Planning Booklet given to patient/ and discussed.  All questions addressed.     SW/CM to follow and assist with d/c needs as needed.         Payor: [employer] MANAGED MEDICARE / Plan: [employer] MEDICARE HMO / Product Type: Capitation /     Extended Emergency Contact Information  Primary Emergency Contact: Afshan Duenas   [facility]  Home Phone: [phone]  Relation: Daughter  Preferred language: English   needed? No  Secondary Emergency Contact: Paul Duenas  Mobile Phone: [phone]  Relation: Healthcare Power of   Preferred language: English   needed? No    Discharge Plan A: (P) [facility]         [id number] [place] - [address]  AT [facility] [address]  [address] [place] [address]  Phone: [phone] Fax: [phone]      Initial Assessment (most recent)       Adult Discharge Assessment - 01/05/23 0938          Discharge Assessment    Assessment Type Discharge Planning Assessment     Confirmed/corrected address, phone number and insurance Yes     Confirmed Demographics Correct on Facesheet     Source of Information patient     When was your last doctors appointment? 01/03/23     Reason For Admission  Cellulitis rt leg     People in Home alone     Do you expect to return to your current living situation? Yes     Do you have help at home or someone to help you manage your care at home? Yes     Who are your caregiver(s) and their phone number(s)? Afshan Duenas [phone]     Prior to hospitilization cognitive status: Alert/Oriented     Current cognitive status: Alert/Oriented     Walking or Climbing Stairs ambulation difficulty, requires equipment;stair climbing difficulty, requires equipment;transferring difficulty, requires equipment     Dressing/Bathing bathing difficulty, assistance 1 person;dressing difficulty, assistance 1 person     Dressing/Bathing Management Need help but has been taking her time     Home Accessibility wheelchair accessible     Equipment Currently Used at Home walker, rolling;cane, straight     Readmission within 30 days? No (P)      Patient currently being followed by outpatient case management? Unable to determine (comments) (P)      Do you currently have service(s) that help you manage your care at home? Yes (P)      Name and Contact number of agency [facility] (P)      Is the pt/caregiver preference to resume services with current agency Yes (P)      Do you take prescription medications? Yes (P)      Do you have prescription coverage? Yes (P)      Do you have any problems affording any of your prescribed medications? No (P)      Is the patient taking medications as prescribed? yes (P)      Who is going to help you get home at discharge? Afshan Duenas [phone] (P)      How do you get to doctors appointments? family or friend will provide (P)      Are you on dialysis? No (P)      Do you take coumadin? No (P)      Discharge Plan A Home Health (P)      Discharge Plan discussed with: Patient (P)                         Sanchez Lisa RN BSN  Case Management

## 2023-01-06 PROBLEM — M70.61 TROCHANTERIC BURSITIS OF RIGHT HIP: Status: ACTIVE | Noted: 2023-01-06

## 2023-01-06 LAB
ANION GAP SERPL CALC-SCNC: 10 MMOL/L (ref 8–16)
ANION GAP SERPL CALC-SCNC: 10 MMOL/L (ref 8–16)
ANION GAP SERPL CALC-SCNC: 7 MMOL/L (ref 8–16)
ANION GAP SERPL CALC-SCNC: 7 MMOL/L (ref 8–16)
ANION GAP SERPL CALC-SCNC: 9 MMOL/L (ref 8–16)
BASOPHILS # BLD AUTO: 0.01 K/UL (ref 0–0.2)
BASOPHILS NFR BLD: 0.2 % (ref 0–1.9)
BUN SERPL-MCNC: 37 MG/DL (ref 8–23)
BUN SERPL-MCNC: 39 MG/DL (ref 8–23)
BUN SERPL-MCNC: 40 MG/DL (ref 8–23)
BUN SERPL-MCNC: 40 MG/DL (ref 8–23)
BUN SERPL-MCNC: 42 MG/DL (ref 8–23)
CALCIUM SERPL-MCNC: 8.5 MG/DL (ref 8.7–10.5)
CALCIUM SERPL-MCNC: 8.7 MG/DL (ref 8.7–10.5)
CALCIUM SERPL-MCNC: 8.7 MG/DL (ref 8.7–10.5)
CALCIUM SERPL-MCNC: 9 MG/DL (ref 8.7–10.5)
CALCIUM SERPL-MCNC: 9 MG/DL (ref 8.7–10.5)
CALCIUM SERPL-MCNC: 9.1 MG/DL (ref 8.7–10.5)
CALCIUM SERPL-MCNC: 9.1 MG/DL (ref 8.7–10.5)
CHLORIDE SERPL-SCNC: 105 MMOL/L (ref 95–110)
CHLORIDE SERPL-SCNC: 105 MMOL/L (ref 95–110)
CHLORIDE SERPL-SCNC: 106 MMOL/L (ref 95–110)
CHLORIDE SERPL-SCNC: 108 MMOL/L (ref 95–110)
CHLORIDE SERPL-SCNC: 110 MMOL/L (ref 95–110)
CO2 SERPL-SCNC: 18 MMOL/L (ref 23–29)
CO2 SERPL-SCNC: 19 MMOL/L (ref 23–29)
CO2 SERPL-SCNC: 20 MMOL/L (ref 23–29)
CO2 SERPL-SCNC: 22 MMOL/L (ref 23–29)
CO2 SERPL-SCNC: 23 MMOL/L (ref 23–29)
CREAT SERPL-MCNC: 1.1 MG/DL (ref 0.5–1.4)
CREAT SERPL-MCNC: 1.2 MG/DL (ref 0.5–1.4)
CREAT SERPL-MCNC: 1.3 MG/DL (ref 0.5–1.4)
CREAT SERPL-MCNC: 1.4 MG/DL (ref 0.5–1.4)
CREAT SERPL-MCNC: 1.4 MG/DL (ref 0.5–1.4)
DIFFERENTIAL METHOD: ABNORMAL
EOSINOPHIL # BLD AUTO: 0.1 K/UL (ref 0–0.5)
EOSINOPHIL NFR BLD: 1.8 % (ref 0–8)
ERYTHROCYTE [DISTWIDTH] IN BLOOD BY AUTOMATED COUNT: 16.4 % (ref 11.5–14.5)
EST. GFR  (NO RACE VARIABLE): 35.7 ML/MIN/1.73 M^2
EST. GFR  (NO RACE VARIABLE): 35.7 ML/MIN/1.73 M^2
EST. GFR  (NO RACE VARIABLE): 39.1 ML/MIN/1.73 M^2
EST. GFR  (NO RACE VARIABLE): 43 ML/MIN/1.73 M^2
EST. GFR  (NO RACE VARIABLE): 47.7 ML/MIN/1.73 M^2
GLUCOSE SERPL-MCNC: 124 MG/DL (ref 70–110)
GLUCOSE SERPL-MCNC: 124 MG/DL (ref 70–110)
GLUCOSE SERPL-MCNC: 138 MG/DL (ref 70–110)
GLUCOSE SERPL-MCNC: 139 MG/DL (ref 70–110)
GLUCOSE SERPL-MCNC: 152 MG/DL (ref 70–110)
GLUCOSE SERPL-MCNC: 187 MG/DL (ref 70–110)
GLUCOSE SERPL-MCNC: 191 MG/DL (ref 70–110)
HCT VFR BLD AUTO: 27.6 % (ref 37–48.5)
HGB BLD-MCNC: 9 G/DL (ref 12–16)
IMM GRANULOCYTES # BLD AUTO: 0.07 K/UL (ref 0–0.04)
IMM GRANULOCYTES NFR BLD AUTO: 1.6 % (ref 0–0.5)
LYMPHOCYTES # BLD AUTO: 0.8 K/UL (ref 1–4.8)
LYMPHOCYTES NFR BLD: 18.3 % (ref 18–48)
MAGNESIUM SERPL-MCNC: 2.2 MG/DL (ref 1.6–2.6)
MCH RBC QN AUTO: 28.8 PG (ref 27–31)
MCHC RBC AUTO-ENTMCNC: 32.6 G/DL (ref 32–36)
MCV RBC AUTO: 88 FL (ref 82–98)
MONOCYTES # BLD AUTO: 0.7 K/UL (ref 0.3–1)
MONOCYTES NFR BLD: 14.8 % (ref 4–15)
NEUTROPHILS # BLD AUTO: 2.8 K/UL (ref 1.8–7.7)
NEUTROPHILS NFR BLD: 63.3 % (ref 38–73)
NRBC BLD-RTO: 0 /100 WBC
PHOSPHATE SERPL-MCNC: 3.8 MG/DL (ref 2.7–4.5)
PLATELET # BLD AUTO: 216 K/UL (ref 150–450)
PMV BLD AUTO: 10.5 FL (ref 9.2–12.9)
POTASSIUM SERPL-SCNC: 4.9 MMOL/L (ref 3.5–5.1)
POTASSIUM SERPL-SCNC: 4.9 MMOL/L (ref 3.5–5.1)
POTASSIUM SERPL-SCNC: 5 MMOL/L (ref 3.5–5.1)
POTASSIUM SERPL-SCNC: 5 MMOL/L (ref 3.5–5.1)
POTASSIUM SERPL-SCNC: 5.2 MMOL/L (ref 3.5–5.1)
POTASSIUM SERPL-SCNC: 5.3 MMOL/L (ref 3.5–5.1)
POTASSIUM SERPL-SCNC: 6 MMOL/L (ref 3.5–5.1)
POTASSIUM SERPL-SCNC: 6 MMOL/L (ref 3.5–5.1)
RBC # BLD AUTO: 3.13 M/UL (ref 4–5.4)
SODIUM SERPL-SCNC: 134 MMOL/L (ref 136–145)
SODIUM SERPL-SCNC: 134 MMOL/L (ref 136–145)
SODIUM SERPL-SCNC: 135 MMOL/L (ref 136–145)
SODIUM SERPL-SCNC: 137 MMOL/L (ref 136–145)
SODIUM SERPL-SCNC: 139 MMOL/L (ref 136–145)
VANCOMYCIN SERPL-MCNC: 9.2 UG/ML
WBC # BLD AUTO: 4.47 K/UL (ref 3.9–12.7)

## 2023-01-06 PROCEDURE — 63600175 PHARM REV CODE 636 W HCPCS: Mod: HCNC | Performed by: PHYSICIAN ASSISTANT

## 2023-01-06 PROCEDURE — 96372 THER/PROPH/DIAG INJ SC/IM: CPT | Performed by: PHYSICIAN ASSISTANT

## 2023-01-06 PROCEDURE — 99233 PR SUBSEQUENT HOSPITAL CARE,LEVL III: ICD-10-PCS | Mod: HCNC,,,

## 2023-01-06 PROCEDURE — 94640 AIRWAY INHALATION TREATMENT: CPT | Mod: HCNC

## 2023-01-06 PROCEDURE — 80202 ASSAY OF VANCOMYCIN: CPT | Mod: HCNC | Performed by: HOSPITALIST

## 2023-01-06 PROCEDURE — 25000003 PHARM REV CODE 250: Mod: HCNC

## 2023-01-06 PROCEDURE — 25000242 PHARM REV CODE 250 ALT 637 W/ HCPCS: Mod: HCNC

## 2023-01-06 PROCEDURE — 36415 COLL VENOUS BLD VENIPUNCTURE: CPT | Mod: HCNC

## 2023-01-06 PROCEDURE — 63600175 PHARM REV CODE 636 W HCPCS: Mod: HCNC | Performed by: HOSPITALIST

## 2023-01-06 PROCEDURE — 80048 BASIC METABOLIC PNL TOTAL CA: CPT | Mod: 91,HCNC | Performed by: PHYSICIAN ASSISTANT

## 2023-01-06 PROCEDURE — 85025 COMPLETE CBC W/AUTO DIFF WBC: CPT | Mod: HCNC | Performed by: PHYSICIAN ASSISTANT

## 2023-01-06 PROCEDURE — 80048 BASIC METABOLIC PNL TOTAL CA: CPT | Mod: 91,HCNC

## 2023-01-06 PROCEDURE — 80048 BASIC METABOLIC PNL TOTAL CA: CPT | Mod: HCNC

## 2023-01-06 PROCEDURE — 99900031 HC PATIENT EDUCATION (STAT): Mod: HCNC

## 2023-01-06 PROCEDURE — 99233 SBSQ HOSP IP/OBS HIGH 50: CPT | Mod: HCNC,,,

## 2023-01-06 PROCEDURE — 83735 ASSAY OF MAGNESIUM: CPT | Mod: HCNC | Performed by: PHYSICIAN ASSISTANT

## 2023-01-06 PROCEDURE — 25000003 PHARM REV CODE 250: Mod: HCNC | Performed by: HOSPITALIST

## 2023-01-06 PROCEDURE — 25000003 PHARM REV CODE 250: Mod: HCNC | Performed by: PHYSICIAN ASSISTANT

## 2023-01-06 PROCEDURE — 99900035 HC TECH TIME PER 15 MIN (STAT): Mod: HCNC

## 2023-01-06 PROCEDURE — 36415 COLL VENOUS BLD VENIPUNCTURE: CPT | Mod: HCNC | Performed by: HOSPITALIST

## 2023-01-06 PROCEDURE — G0378 HOSPITAL OBSERVATION PER HR: HCPCS | Mod: HCNC

## 2023-01-06 PROCEDURE — 87040 BLOOD CULTURE FOR BACTERIA: CPT | Mod: 59,HCNC

## 2023-01-06 PROCEDURE — 84100 ASSAY OF PHOSPHORUS: CPT | Mod: HCNC | Performed by: PHYSICIAN ASSISTANT

## 2023-01-06 RX ORDER — DICLOFENAC SODIUM 10 MG/G
2 GEL TOPICAL 2 TIMES DAILY
Status: DISCONTINUED | OUTPATIENT
Start: 2023-01-06 | End: 2023-01-24 | Stop reason: HOSPADM

## 2023-01-06 RX ORDER — SILVER SULFADIAZINE 10 G/1000G
CREAM TOPICAL 2 TIMES DAILY
Status: DISCONTINUED | OUTPATIENT
Start: 2023-01-06 | End: 2023-01-24 | Stop reason: HOSPADM

## 2023-01-06 RX ORDER — DIPHENHYDRAMINE HCL 25 MG
25 CAPSULE ORAL EVERY 6 HOURS PRN
Status: DISCONTINUED | OUTPATIENT
Start: 2023-01-06 | End: 2023-01-08

## 2023-01-06 RX ORDER — ACETAMINOPHEN 500 MG
1000 TABLET ORAL EVERY 6 HOURS
Status: DISCONTINUED | OUTPATIENT
Start: 2023-01-06 | End: 2023-01-12

## 2023-01-06 RX ORDER — CIPROFLOXACIN 500 MG/1
500 TABLET ORAL EVERY 12 HOURS
Status: DISCONTINUED | OUTPATIENT
Start: 2023-01-06 | End: 2023-01-07

## 2023-01-06 RX ORDER — LIDOCAINE HYDROCHLORIDE 20 MG/ML
SOLUTION OROPHARYNGEAL
Status: DISCONTINUED | OUTPATIENT
Start: 2023-01-06 | End: 2023-01-24 | Stop reason: HOSPADM

## 2023-01-06 RX ORDER — FUROSEMIDE 20 MG/1
20 TABLET ORAL 2 TIMES DAILY
Status: DISCONTINUED | OUTPATIENT
Start: 2023-01-06 | End: 2023-01-08

## 2023-01-06 RX ADMIN — CIPROFLOXACIN HYDROCHLORIDE 500 MG: 500 TABLET, FILM COATED ORAL at 08:01

## 2023-01-06 RX ADMIN — ALBUTEROL SULFATE 10 MG: 2.5 SOLUTION RESPIRATORY (INHALATION) at 01:01

## 2023-01-06 RX ADMIN — CETIRIZINE HYDROCHLORIDE 5 MG: 5 TABLET, FILM COATED ORAL at 08:01

## 2023-01-06 RX ADMIN — OXYCODONE 5 MG: 5 TABLET ORAL at 06:01

## 2023-01-06 RX ADMIN — ENOXAPARIN SODIUM 30 MG: 30 INJECTION SUBCUTANEOUS at 04:01

## 2023-01-06 RX ADMIN — OXYCODONE 5 MG: 5 TABLET ORAL at 08:01

## 2023-01-06 RX ADMIN — ALBUTEROL SULFATE 10 MG: 2.5 SOLUTION RESPIRATORY (INHALATION) at 12:01

## 2023-01-06 RX ADMIN — SODIUM ZIRCONIUM CYCLOSILICATE 10 G: 10 POWDER, FOR SUSPENSION ORAL at 08:01

## 2023-01-06 RX ADMIN — VANCOMYCIN HYDROCHLORIDE 1500 MG: 1.5 INJECTION, POWDER, LYOPHILIZED, FOR SOLUTION INTRAVENOUS at 10:01

## 2023-01-06 RX ADMIN — DICLOFENAC SODIUM 2 G: 10 GEL TOPICAL at 08:01

## 2023-01-06 RX ADMIN — ACETAMINOPHEN 1000 MG: 500 TABLET ORAL at 11:01

## 2023-01-06 RX ADMIN — ALBUTEROL SULFATE 10 MG: 2.5 SOLUTION RESPIRATORY (INHALATION) at 08:01

## 2023-01-06 RX ADMIN — ACETAMINOPHEN 1000 MG: 500 TABLET ORAL at 12:01

## 2023-01-06 RX ADMIN — PRAVASTATIN SODIUM 40 MG: 40 TABLET ORAL at 08:01

## 2023-01-06 RX ADMIN — DIPHENHYDRAMINE HYDROCHLORIDE 25 MG: 25 CAPSULE ORAL at 08:01

## 2023-01-06 RX ADMIN — ASPIRIN 81 MG: 81 TABLET, COATED ORAL at 08:01

## 2023-01-06 RX ADMIN — FAMOTIDINE 20 MG: 20 TABLET ORAL at 08:01

## 2023-01-06 RX ADMIN — OXYCODONE 5 MG: 5 TABLET ORAL at 11:01

## 2023-01-06 RX ADMIN — SODIUM ZIRCONIUM CYCLOSILICATE 10 G: 10 POWDER, FOR SUSPENSION ORAL at 04:01

## 2023-01-06 RX ADMIN — ACETAMINOPHEN 1000 MG: 500 TABLET ORAL at 06:01

## 2023-01-06 RX ADMIN — FUROSEMIDE 20 MG: 20 TABLET ORAL at 06:01

## 2023-01-06 NOTE — PROGRESS NOTES
"Pharmacokinetic Assessment Follow Up: IV Vancomycin    Vancomycin serum concentration assessment(s):    The random level was drawn correctly and can be used to guide therapy at this time. The measurement is within the desired definitive target range of 10 to 20 mcg/mL. Result is 9.2 ug/mL but will keep dose the same expecting accumulation    Vancomycin Regimen Plan:    Continue regimen to Vancomycin 1500 mg IV today for 1030 with next serum trough concentration measured tomorrow morning on 1/7 at 0430 with AM labs    Drug levels (last 3 results):  Recent Labs   Lab Result Units 01/06/23  0505   Vancomycin, Random ug/mL 9.2       Pharmacy will continue to follow and monitor vancomycin.    Please contact pharmacy at extension [phone] for questions regarding this assessment.    Thank you for the consult,   Madeline Chance, PharmD       Patient brief summary:  Jenniffer Jimenez is a 89 y.o. female initiated on antimicrobial therapy with IV Vancomycin for treatment of skin & soft tissue infection      Drug Allergies:   Review of patient's allergies indicates:   Allergen Reactions    Opioids - morphine analogues Other (See Comments)     Bowel issues; bowel obstruction    Tizanidine Other (See Comments)     "Lips were numb,  Almost passed out."    Tramadol Hallucinations    Beta-blockers (beta-adrenergic blocking agts) Other (See Comments)     Can not go on beta blockers for long period of time - due to taking allergy injections    Morphine     Opioids-meperidine and related     Ciprofloxacin Rash       Actual Body Weight:   76.8 kg    Renal Function:   Estimated Creatinine Clearance: 29.5 mL/min (based on SCr of 1.3 mg/dL).,     Dialysis Method (if applicable):  N/A    CBC (last 72 hours):  Recent Labs   Lab Result Units 01/04/23  2308 01/05/23  1533 01/06/23  0505   WBC K/uL 7.29 5.03 4.47   Hemoglobin g/dL 10.4* 9.6* 9.0*   Hematocrit % 31.4* 29.7* 27.6*   Platelets K/uL 252 221 216   Gran % % 76.0* 68.0 63.3   Lymph % " % 10.0* 16.0* 18.3   Mono % % 9.0 13.0 14.8   Eosinophil % % 2.0 2.0 1.8   Basophil % % 3.0* 1.0 0.2   Differential Method  Manual Manual Automated       Metabolic Panel (last 72 hours):  Recent Labs   Lab Result Units 01/04/23  2308 01/05/23  0807 01/05/23  1533 01/05/23  1847 01/06/23  0036 01/06/23  0505   Sodium mmol/L 137  --  137 136 137 139  139   Potassium mmol/L 5.9* 5.3* 5.1 5.3*  5.3*  5.3* 5.3*  5.3*  5.3* 5.2*  5.2*  5.2*  5.2*   Chloride mmol/L 108  --  109 108 108 110  110   CO2 mmol/L 20*  --  21* 21* 22* 20*  20*   Glucose mg/dL 127*  --  171* 150* 152* 124*  124*   BUN mg/dL 48*  --  39* 40* 42* 40*  40*   Creatinine mg/dL 1.5*  --  1.4 1.4 1.3 1.3  1.3   Magnesium mg/dL  --   --  2.1  --   --  2.2   Phosphorus mg/dL  --   --   --   --   --  3.8       Vancomycin Administrations:  vancomycin given in the last 96 hours                     vancomycin 1,500 mg in dextrose 5 % 250 mL IVPB (mg) 1,500 mg New Bag 01/05/23 0406                    Microbiologic Results:  Microbiology Results (last 7 days)       ** No results found for the last 168 hours. **

## 2023-01-06 NOTE — SUBJECTIVE & OBJECTIVE
Interval History: Pt seen and examined by me this morning with daughter at bedside. JOSE RAUL WILLSON. Pt reports RLE pain is somewhat improved from previous. Wound undressed with wound care at bedside. Previous dressing with green/blue malodorous drainage. Pt reports R hip pain, currently 9/10, worse with movement and palpation. Per daughter, pt has a history of R greater trochanteric bursitis, and frequently has this issue. Otherwise she is without complaints at this time.     Review of Systems   Constitutional:  Negative for appetite change, chills and fever.   HENT:  Negative for congestion, trouble swallowing and voice change.    Eyes:  Negative for photophobia and visual disturbance.   Respiratory:  Negative for cough, chest tightness and wheezing.    Cardiovascular:  Positive for leg swelling. Negative for chest pain and palpitations.   Gastrointestinal:  Negative for abdominal pain, constipation, diarrhea, nausea and vomiting.   Endocrine: Negative for polyphagia and polyuria.   Genitourinary:  Negative for decreased urine volume, difficulty urinating, dysuria, flank pain and hematuria.   Musculoskeletal:  Positive for arthralgias (R hip). Negative for back pain and gait problem.   Skin:  Positive for color change and wound.   Neurological:  Negative for dizziness, seizures, syncope, weakness, numbness and headaches.   Psychiatric/Behavioral:  Negative for agitation, behavioral problems and confusion.    Objective:     Vital Signs (Most Recent):  Temp: 98.1 °F (36.7 °C) (01/06/23 1516)  Pulse: 109 (01/06/23 1516)  Resp: 20 (01/06/23 1516)  BP: (!) 106/52 (01/06/23 1516)  SpO2: (!) 92 % (01/06/23 1516)   Vital Signs (24h Range):  Temp:  [97.3 °F (36.3 °C)-98.1 °F (36.7 °C)] 98.1 °F (36.7 °C)  Pulse:  [] 109  Resp:  [17-20] 20  SpO2:  [92 %-97 %] 92 %  BP: (106-159)/(52-83) 106/52     Weight: 76.8 kg (169 lb 5 oz)  Body mass index is 28.18 kg/m².    Intake/Output Summary (Last 24 hours) at 1/6/2023 1538  Last  data filed at 1/6/2023 1221  Gross per 24 hour   Intake --   Output 800 ml   Net -800 ml      Physical Exam  Vitals and nursing note reviewed.   Constitutional:       General: She is not in acute distress.     Appearance: She is well-developed.   HENT:      Head: Normocephalic and atraumatic.      Mouth/Throat:      Pharynx: No oropharyngeal exudate.   Eyes:      Extraocular Movements: Extraocular movements intact.      Conjunctiva/sclera: Conjunctivae normal.   Cardiovascular:      Rate and Rhythm: Normal rate and regular rhythm.      Heart sounds: Normal heart sounds.   Pulmonary:      Effort: Pulmonary effort is normal. No respiratory distress.      Breath sounds: Rales present. No wheezing.      Comments: Coarse BS to BLL  Abdominal:      General: Bowel sounds are normal. There is no distension.      Palpations: Abdomen is soft.      Tenderness: There is no abdominal tenderness.   Musculoskeletal:         General: Swelling and tenderness present. Normal range of motion.      Cervical back: Normal range of motion and neck supple.   Lymphadenopathy:      Cervical: No cervical adenopathy.   Skin:     General: Skin is warm and dry.      Capillary Refill: Capillary refill takes less than 2 seconds.      Findings: Erythema present. No rash.      Comments: RLE dressing removed with green/blue malodorous drainage. Underlying skin erythematous, warm, and TTP (appears improved from previous images). Good pulses and perfusion.    Neurological:      Mental Status: She is alert and oriented to person, place, and time.      Cranial Nerves: No cranial nerve deficit.      Sensory: No sensory deficit.      Coordination: Coordination normal.   Psychiatric:         Behavior: Behavior normal.         Thought Content: Thought content normal.         Judgment: Judgment normal.     RLE 1/4/23:      RLE 1/6/23:        Significant Labs: All pertinent labs within the past 24 hours have been reviewed.  CBC:   Recent Labs   Lab  01/04/23  2308 01/05/23  0050 01/05/23  1533 01/06/23  0505   WBC 7.29  --  5.03 4.47   HGB 10.4*  --  9.6* 9.0*   HCT 31.4* 29* 29.7* 27.6*     --  221 216     CMP:   Recent Labs   Lab 01/06/23  0505 01/06/23  0840 01/06/23  1147     139 139 135*   K 5.2*  5.2*  5.2*  5.2* 6.0*  6.0* 5.3*  5.3*     110 110 105   CO2 20*  20* 20* 23   *  124* 138* 139*   BUN 40*  40* 39* 37*   CREATININE 1.3  1.3 1.1 1.2   CALCIUM 9.0  9.0 9.1 9.1   ANIONGAP 9  9 9 7*       Significant Imaging: I have reviewed all pertinent imaging results/findings within the past 24 hours.    US Lower Extremity Veins Bilateral  Narrative: EXAMINATION:  US LOWER EXTREMITY VEINS BILATERAL    CLINICAL HISTORY:  Rule out DVT;    TECHNIQUE:  Duplex and color flow Doppler and dynamic compression was performed of the bilateral lower extremity veins was performed.    COMPARISON:  Ultrasound 11/08/2022 09/25/2022 01/02/2020    FINDINGS:  Right thigh veins: The common femoral, femoral, popliteal, upper greater saphenous, and deep femoral veins are patent and free of thrombus. The veins are normally compressible and have normal phasic flow and augmentation response.    Right calf veins: Posterior tibial vein is not visualized due to overlying bandage.  The remaining visualized calf veins are patent.    Left thigh veins: The common femoral, femoral, popliteal, upper greater saphenous, and deep femoral veins are patent and free of thrombus. The veins are normally compressible and have normal phasic flow and augmentation response.    Left calf veins: The visualized calf veins are patent.    Miscellaneous: Left Baker's cyst measuring 0.7 x 0.6 x 0.4 cm.  Bilateral lower extremities edema.  Impression: No evidence of deep venous thrombosis in either lower extremity.    Subcentimeter left Baker's cyst.    Electronically signed by resident: Ekaterina Gutierrez  Date:    01/05/2023  Time:    11:58    Electronically signed  "by: Joesph Chery MD  Date:    01/05/2023  Time:    12:12  X-Ray Chest AP Portable  Narrative: EXAMINATION:  XR CHEST AP PORTABLE    CLINICAL HISTORY:  Provided history is "SOB;  ".    TECHNIQUE:  One view of the chest.    COMPARISON:  09/25/2022.    FINDINGS:  Cardiac wires overlie the chest.  Cardiomediastinal silhouette is mildly enlarged and similar to the prior study.  Atherosclerotic calcifications overlie the aortic arch.  Stable elevation of the left hemidiaphragm.  Coarsened bilateral interstitial lung markings but no confluent area of consolidation.  No large pleural effusion.  No distinct pneumothorax.  Postoperative changes in the left humerus.  Impression: Cardiomegaly and coarsened interstitial lung markings.  No confluent area of consolidation or detrimental change when compared with 09/25/2022.    Electronically signed by: Tye Ferris MD  Date:    01/05/2023  Time:    01:39     "

## 2023-01-06 NOTE — PLAN OF CARE
01/06/23 1216   Post-Acute Status   Post-Acute Authorization Home Health   Home Health Status Pending medical clearance/testing   Discharge Delays   (Medical clearance)   Discharge Plan   Discharge Plan A Home Health

## 2023-01-06 NOTE — CONSULTS
[facility] - Observation 11[facility]  Wound Care    Patient Name:  Jenniffer Jimenez   MRN:  [id number]  Date: 2023  Diagnosis: Cellulitis of right lower extremity    History:     Past Medical History:   Diagnosis Date    Amblyopia of left eye 4/10/2013    Anxiety     Arthritis     Facet arthropathy, Lumbosacral    Cataract     Central retinal vein occlusion of left eye     Depression     Diabetic polyneuropathy 2022    Exotropia of both eyes 2013    recession RSR 5.0mm w/ adj; recession LR os 5.0 w/ adj; resect MR os  4.0mm    Hearing loss     History of resection of small bowel     Hypertensive retinopathy of both eyes     Hypoglycemia     Macular degeneration     OA (osteoarthritis) of shoulder     Right    Osteoporosis     Posterior vitreous detachment of both eyes     Rhinitis     TIA (transient ischemic attack)        Social History     Socioeconomic History    Marital status:    Occupational History    Occupation: retired   Tobacco Use    Smoking status: Former     Types: Cigarettes     Quit date: 10/29/1982     Years since quittin.2     Passive exposure: Never    Smokeless tobacco: Never   Substance and Sexual Activity    Alcohol use: Yes     Alcohol/week: 2.0 standard drinks     Types: 2 Shots of liquor per week     Comment: rare    Drug use: No    Sexual activity: Never   Other Topics Concern    Are you pregnant or think you may be? No    Breast-feeding No     Social Determinants of Health     Financial Resource Strain: Low Risk     Difficulty of Paying Living Expenses: Not hard at all   Food Insecurity: No Food Insecurity    Worried About Running Out of Food in the Last Year: Never true    Ran Out of Food in the Last Year: Never true   Transportation Needs: No Transportation Needs    Lack of Transportation (Medical): No    Lack of Transportation (Non-Medical): No   Physical Activity: Inactive    Days of Exercise per Week: 0 days    Minutes of Exercise per Session: 0 min   Stress: Stress  Concern Present    Feeling of Stress : To some extent   Social Connections: Socially Isolated    Frequency of Communication with Friends and Family: Once a week    Frequency of Social Gatherings with Friends and Family: Once a week    Attends [employer] Services: Never    Active Member of Clubs or Organizations: Yes    Attends Club or Organization Meetings: Never    Marital Status:    Housing Stability: Low Risk     Unable to Pay for Housing in the Last Year: No    Number of Places Lived in the Last Year: 1    Unstable Housing in the Last Year: No       Precautions:     Allergies as of 01/04/2023 - Reviewed 01/04/2023   Allergen Reaction Noted    Opioids - morphine analogues Other (See Comments) 08/15/2018    Tizanidine Other (See Comments) 04/06/2018    Tramadol Hallucinations 01/18/2013    Beta-blockers (beta-adrenergic blocking agts) Other (See Comments) 10/23/2013    Morphine  12/24/2021    Opioids-meperidine and related  01/04/2022    Ciprofloxacin Rash 12/28/2022       [facility] Assessment Details/Treatment     Patient seen for wound consult for RLE. Seen with Lorena VAZQUEZ.  Previous dressing with greenish/blue malodorous drainage. Partial thickness skin loss noted to right medial knee and right distal lower extremity(circumferential).  Recommend twice a day dressing changes: clean with quarter strength dakin's solution, dress with silvadene, vaseline gauze, ABD pads and secure with kerlix.  PA approved recommendations. Orders placed for nursing. Contact wound care dept for any needs.     01/06/23 1101        Altered Skin Integrity 09/25/22 2010 Right lower Leg #1   Date First Assessed/Time First Assessed: 09/25/22 2010   Altered Skin Integrity Present on Admission: yes  Side: Right  Orientation: lower  Location: Leg  Wound Number: #1   Dressing Appearance Saturated  (green/blue malodorous)   Drainage Amount Scant   Drainage Characteristics/Odor Serosanguineous;No odor   Appearance Moist;Red  (lower  leg/circumferential and medial knee)   Tissue loss description Partial thickness   Periwound Area Dry   Wound Edges Open   Care Cleansed with:;Sterile normal saline   Dressing Changed;Applied  (vaseline gauze, abd pad, kerlix)       Right medial knee    RLE

## 2023-01-06 NOTE — HOSPITAL COURSE
Pt placed in observation for management of worsening RLE cellulitis. Pt received IV vancomycin and ciprofloxacin initially, vancomycin discontinued. Derm consulted for worsening rash, ciprofloxacin discontinued for suspected drug reaction, and the pt was started on doxycycline. Psych consulted for worsening delirium and agitation and pt returned to mental baseline. ID consulted for worsening cellulitis. ID recommends stopping abx and treating with antifungals and steroids. JOSHUA improving s/p IVF, Cr back to baseline. Discontinued lasix and spironolactone, continue lokelma outpatient. Patient medically stable and ready for discharge to SNF. [facility] accepting. Will discharge to SNF. SNF orders updated. Patient will follow up with wound clinic, vascular, infectious disease, and dermatology. Plan of care discussed with patient, patient agreeable to plan, and all questions answered.

## 2023-01-07 LAB
ABO + RH BLD: NORMAL
ANION GAP SERPL CALC-SCNC: 12 MMOL/L (ref 8–16)
ANION GAP SERPL CALC-SCNC: 8 MMOL/L (ref 8–16)
ANION GAP SERPL CALC-SCNC: 9 MMOL/L (ref 8–16)
BACTERIA #/AREA URNS AUTO: ABNORMAL /HPF
BASOPHILS # BLD AUTO: 0.02 K/UL (ref 0–0.2)
BASOPHILS NFR BLD: 0.4 % (ref 0–1.9)
BILIRUB UR QL STRIP: NEGATIVE
BLD GP AB SCN CELLS X3 SERPL QL: NORMAL
BUN SERPL-MCNC: 41 MG/DL (ref 8–23)
BUN SERPL-MCNC: 44 MG/DL (ref 8–23)
BUN SERPL-MCNC: 44 MG/DL (ref 8–23)
BUN SERPL-MCNC: 45 MG/DL (ref 8–23)
BUN SERPL-MCNC: 47 MG/DL (ref 8–23)
CALCIUM SERPL-MCNC: 8.4 MG/DL (ref 8.7–10.5)
CALCIUM SERPL-MCNC: 8.4 MG/DL (ref 8.7–10.5)
CALCIUM SERPL-MCNC: 8.6 MG/DL (ref 8.7–10.5)
CALCIUM SERPL-MCNC: 8.8 MG/DL (ref 8.7–10.5)
CALCIUM SERPL-MCNC: 8.8 MG/DL (ref 8.7–10.5)
CAOX CRY UR QL COMP ASSIST: ABNORMAL
CHLORIDE SERPL-SCNC: 103 MMOL/L (ref 95–110)
CHLORIDE SERPL-SCNC: 104 MMOL/L (ref 95–110)
CHLORIDE SERPL-SCNC: 105 MMOL/L (ref 95–110)
CHLORIDE SERPL-SCNC: 105 MMOL/L (ref 95–110)
CHLORIDE SERPL-SCNC: 107 MMOL/L (ref 95–110)
CLARITY UR REFRACT.AUTO: ABNORMAL
CO2 SERPL-SCNC: 16 MMOL/L (ref 23–29)
CO2 SERPL-SCNC: 19 MMOL/L (ref 23–29)
CO2 SERPL-SCNC: 21 MMOL/L (ref 23–29)
COLOR UR AUTO: YELLOW
CREAT SERPL-MCNC: 1.3 MG/DL (ref 0.5–1.4)
CREAT SERPL-MCNC: 1.4 MG/DL (ref 0.5–1.4)
CREAT SERPL-MCNC: 1.4 MG/DL (ref 0.5–1.4)
CREAT SERPL-MCNC: 1.6 MG/DL (ref 0.5–1.4)
CREAT SERPL-MCNC: 1.7 MG/DL (ref 0.5–1.4)
DIFFERENTIAL METHOD: ABNORMAL
EOSINOPHIL # BLD AUTO: 0.1 K/UL (ref 0–0.5)
EOSINOPHIL NFR BLD: 1.6 % (ref 0–8)
ERYTHROCYTE [DISTWIDTH] IN BLOOD BY AUTOMATED COUNT: 16.5 % (ref 11.5–14.5)
EST. GFR  (NO RACE VARIABLE): 28.3 ML/MIN/1.73 M^2
EST. GFR  (NO RACE VARIABLE): 30.4 ML/MIN/1.73 M^2
EST. GFR  (NO RACE VARIABLE): 35.7 ML/MIN/1.73 M^2
EST. GFR  (NO RACE VARIABLE): 35.7 ML/MIN/1.73 M^2
EST. GFR  (NO RACE VARIABLE): 39.1 ML/MIN/1.73 M^2
GLUCOSE SERPL-MCNC: 111 MG/DL (ref 70–110)
GLUCOSE SERPL-MCNC: 129 MG/DL (ref 70–110)
GLUCOSE SERPL-MCNC: 129 MG/DL (ref 70–110)
GLUCOSE SERPL-MCNC: 138 MG/DL (ref 70–110)
GLUCOSE SERPL-MCNC: 153 MG/DL (ref 70–110)
GLUCOSE UR QL STRIP: NEGATIVE
HCT VFR BLD AUTO: 26.8 % (ref 37–48.5)
HGB BLD-MCNC: 8.5 G/DL (ref 12–16)
HGB UR QL STRIP: NEGATIVE
HYALINE CASTS UR QL AUTO: 62 /LPF
IMM GRANULOCYTES # BLD AUTO: 0.12 K/UL (ref 0–0.04)
IMM GRANULOCYTES NFR BLD AUTO: 2.7 % (ref 0–0.5)
KETONES UR QL STRIP: NEGATIVE
LEUKOCYTE ESTERASE UR QL STRIP: NEGATIVE
LYMPHOCYTES # BLD AUTO: 0.7 K/UL (ref 1–4.8)
LYMPHOCYTES NFR BLD: 14.9 % (ref 18–48)
MAGNESIUM SERPL-MCNC: 2.1 MG/DL (ref 1.6–2.6)
MCH RBC QN AUTO: 28.5 PG (ref 27–31)
MCHC RBC AUTO-ENTMCNC: 31.7 G/DL (ref 32–36)
MCV RBC AUTO: 90 FL (ref 82–98)
MICROSCOPIC COMMENT: ABNORMAL
MONOCYTES # BLD AUTO: 0.6 K/UL (ref 0.3–1)
MONOCYTES NFR BLD: 13.7 % (ref 4–15)
NEUTROPHILS # BLD AUTO: 3 K/UL (ref 1.8–7.7)
NEUTROPHILS NFR BLD: 66.7 % (ref 38–73)
NITRITE UR QL STRIP: NEGATIVE
NON-SQ EPI CELLS #/AREA URNS AUTO: 0 /HPF
NRBC BLD-RTO: 0 /100 WBC
PH UR STRIP: 5 [PH] (ref 5–8)
PHOSPHATE SERPL-MCNC: 4.7 MG/DL (ref 2.7–4.5)
PLATELET # BLD AUTO: 193 K/UL (ref 150–450)
PMV BLD AUTO: 10 FL (ref 9.2–12.9)
POCT GLUCOSE: 184 MG/DL (ref 70–110)
POTASSIUM SERPL-SCNC: 4.7 MMOL/L (ref 3.5–5.1)
POTASSIUM SERPL-SCNC: 4.7 MMOL/L (ref 3.5–5.1)
POTASSIUM SERPL-SCNC: 4.9 MMOL/L (ref 3.5–5.1)
POTASSIUM SERPL-SCNC: 5.1 MMOL/L (ref 3.5–5.1)
POTASSIUM SERPL-SCNC: 5.1 MMOL/L (ref 3.5–5.1)
POTASSIUM SERPL-SCNC: 6.6 MMOL/L (ref 3.5–5.1)
POTASSIUM SERPL-SCNC: 6.6 MMOL/L (ref 3.5–5.1)
PROT UR QL STRIP: ABNORMAL
RBC # BLD AUTO: 2.98 M/UL (ref 4–5.4)
RBC #/AREA URNS AUTO: 2 /HPF (ref 0–4)
SODIUM SERPL-SCNC: 131 MMOL/L (ref 136–145)
SODIUM SERPL-SCNC: 133 MMOL/L (ref 136–145)
SODIUM SERPL-SCNC: 135 MMOL/L (ref 136–145)
SP GR UR STRIP: 1.02 (ref 1–1.03)
SQUAMOUS #/AREA URNS AUTO: 7 /HPF
URN SPEC COLLECT METH UR: ABNORMAL
VANCOMYCIN SERPL-MCNC: 18.6 UG/ML
WBC # BLD AUTO: 4.51 K/UL (ref 3.9–12.7)
WBC #/AREA URNS AUTO: 1 /HPF (ref 0–5)

## 2023-01-07 PROCEDURE — 83735 ASSAY OF MAGNESIUM: CPT | Mod: HCNC | Performed by: PHYSICIAN ASSISTANT

## 2023-01-07 PROCEDURE — G0378 HOSPITAL OBSERVATION PER HR: HCPCS | Mod: HCNC

## 2023-01-07 PROCEDURE — 80202 ASSAY OF VANCOMYCIN: CPT | Mod: HCNC | Performed by: HOSPITALIST

## 2023-01-07 PROCEDURE — 81001 URINALYSIS AUTO W/SCOPE: CPT | Mod: HCNC | Performed by: EMERGENCY MEDICINE

## 2023-01-07 PROCEDURE — 25000242 PHARM REV CODE 250 ALT 637 W/ HCPCS: Mod: HCNC | Performed by: NURSE PRACTITIONER

## 2023-01-07 PROCEDURE — 94640 AIRWAY INHALATION TREATMENT: CPT | Mod: HCNC

## 2023-01-07 PROCEDURE — 96372 THER/PROPH/DIAG INJ SC/IM: CPT | Performed by: PHYSICIAN ASSISTANT

## 2023-01-07 PROCEDURE — 25000003 PHARM REV CODE 250: Mod: HCNC | Performed by: PHYSICIAN ASSISTANT

## 2023-01-07 PROCEDURE — 80048 BASIC METABOLIC PNL TOTAL CA: CPT | Mod: 91,HCNC

## 2023-01-07 PROCEDURE — 63600175 PHARM REV CODE 636 W HCPCS: Mod: HCNC | Performed by: HOSPITALIST

## 2023-01-07 PROCEDURE — 85025 COMPLETE CBC W/AUTO DIFF WBC: CPT | Mod: HCNC | Performed by: PHYSICIAN ASSISTANT

## 2023-01-07 PROCEDURE — 80048 BASIC METABOLIC PNL TOTAL CA: CPT | Mod: HCNC | Performed by: PHYSICIAN ASSISTANT

## 2023-01-07 PROCEDURE — 86900 BLOOD TYPING SEROLOGIC ABO: CPT | Mod: HCNC

## 2023-01-07 PROCEDURE — 84100 ASSAY OF PHOSPHORUS: CPT | Mod: HCNC | Performed by: PHYSICIAN ASSISTANT

## 2023-01-07 PROCEDURE — 25000003 PHARM REV CODE 250: Mod: HCNC | Performed by: NURSE PRACTITIONER

## 2023-01-07 PROCEDURE — 99233 PR SUBSEQUENT HOSPITAL CARE,LEVL III: ICD-10-PCS | Mod: HCNC,,,

## 2023-01-07 PROCEDURE — 25000003 PHARM REV CODE 250: Mod: HCNC | Performed by: HOSPITALIST

## 2023-01-07 PROCEDURE — 99233 SBSQ HOSP IP/OBS HIGH 50: CPT | Mod: HCNC,,,

## 2023-01-07 PROCEDURE — 63600175 PHARM REV CODE 636 W HCPCS: Mod: HCNC | Performed by: PHYSICIAN ASSISTANT

## 2023-01-07 PROCEDURE — 36415 COLL VENOUS BLD VENIPUNCTURE: CPT | Mod: HCNC

## 2023-01-07 PROCEDURE — 25000003 PHARM REV CODE 250: Mod: HCNC

## 2023-01-07 RX ORDER — CALCIUM GLUCONATE 20 MG/ML
1 INJECTION, SOLUTION INTRAVENOUS ONCE
Status: COMPLETED | OUTPATIENT
Start: 2023-01-07 | End: 2023-01-07

## 2023-01-07 RX ORDER — ALBUTEROL SULFATE 2.5 MG/.5ML
10 SOLUTION RESPIRATORY (INHALATION) ONCE
Status: COMPLETED | OUTPATIENT
Start: 2023-01-07 | End: 2023-01-07

## 2023-01-07 RX ORDER — CIPROFLOXACIN 500 MG/1
500 TABLET ORAL EVERY 24 HOURS
Status: DISCONTINUED | OUTPATIENT
Start: 2023-01-08 | End: 2023-01-11

## 2023-01-07 RX ADMIN — FAMOTIDINE 20 MG: 20 TABLET ORAL at 08:01

## 2023-01-07 RX ADMIN — ACETAMINOPHEN 1000 MG: 500 TABLET ORAL at 08:01

## 2023-01-07 RX ADMIN — FUROSEMIDE 20 MG: 20 TABLET ORAL at 05:01

## 2023-01-07 RX ADMIN — CETIRIZINE HYDROCHLORIDE 5 MG: 5 TABLET, FILM COATED ORAL at 08:01

## 2023-01-07 RX ADMIN — FUROSEMIDE 20 MG: 20 TABLET ORAL at 08:01

## 2023-01-07 RX ADMIN — DICLOFENAC SODIUM 2 G: 10 GEL TOPICAL at 08:01

## 2023-01-07 RX ADMIN — SODIUM ZIRCONIUM CYCLOSILICATE 10 G: 10 POWDER, FOR SUSPENSION ORAL at 08:01

## 2023-01-07 RX ADMIN — DIPHENHYDRAMINE HYDROCHLORIDE 25 MG: 25 CAPSULE ORAL at 08:01

## 2023-01-07 RX ADMIN — OXYCODONE 5 MG: 5 TABLET ORAL at 05:01

## 2023-01-07 RX ADMIN — ACETAMINOPHEN 1000 MG: 500 TABLET ORAL at 06:01

## 2023-01-07 RX ADMIN — SILVER SULFADIAZINE: 10 CREAM TOPICAL at 08:01

## 2023-01-07 RX ADMIN — VANCOMYCIN HYDROCHLORIDE 1000 MG: 1 INJECTION, POWDER, LYOPHILIZED, FOR SOLUTION INTRAVENOUS at 11:01

## 2023-01-07 RX ADMIN — ALBUTEROL SULFATE 10 MG: 2.5 SOLUTION RESPIRATORY (INHALATION) at 10:01

## 2023-01-07 RX ADMIN — OXYCODONE 5 MG: 5 TABLET ORAL at 11:01

## 2023-01-07 RX ADMIN — CIPROFLOXACIN HYDROCHLORIDE 500 MG: 500 TABLET, FILM COATED ORAL at 08:01

## 2023-01-07 RX ADMIN — ENOXAPARIN SODIUM 30 MG: 30 INJECTION SUBCUTANEOUS at 05:01

## 2023-01-07 RX ADMIN — OXYCODONE 5 MG: 5 TABLET ORAL at 06:01

## 2023-01-07 RX ADMIN — CALCIUM GLUCONATE 1 G: 20 INJECTION, SOLUTION INTRAVENOUS at 10:01

## 2023-01-07 RX ADMIN — PRAVASTATIN SODIUM 40 MG: 40 TABLET ORAL at 08:01

## 2023-01-07 RX ADMIN — ASPIRIN 81 MG: 81 TABLET, COATED ORAL at 08:01

## 2023-01-07 NOTE — PROGRESS NOTES
Pharmacist Renal Dose Adjustment Note    Jenniffer Jimenez is a 89 y.o. female being treated with the medication Ciprofloxacin.     Patient Data:    Vital Signs (Most Recent):  Temp: 98 °F (36.7 °C) (01/07/23 1121)  Pulse: 76 (01/07/23 1121)  Resp: 18 (01/07/23 1155)  BP: (!) 100/58 (01/07/23 1121)  SpO2: 96 % (01/07/23 1121)   Vital Signs (72h Range):  Temp:  [97 °F (36.1 °C)-98.8 °F (37.1 °C)]   Pulse:  []   Resp:  [16-22]   BP: (100-159)/(52-93)   SpO2:  [92 %-99 %]      Recent Labs   Lab 01/06/23 2003 01/07/23 0018 01/07/23  0526   CREATININE 1.4 1.3 1.4  1.4     Serum creatinine: 1.4 mg/dL 01/07/23 0526  Estimated creatinine clearance: 27.4 mL/min    Ciprofloxacin 500mg BID will be changed to Ciprofloxacin 500mg daily.     Pharmacist's Name: Tato Ballesteros  Pharmacist's Extension: [phone]

## 2023-01-07 NOTE — PROGRESS NOTES
"Pharmacokinetic Assessment Follow Up: IV Vancomycin    Vancomycin serum concentration assessment(s):    The random level was drawn around 17 hours from last dose and can be used to guide therapy at this time.   The measurement is above the desired definitive target range of 10 to 15 mcg/mL.    Vancomycin Regimen Plan:    Scr trending up, CKD3b  Continue pulse dosing.   Give a 1 X 1000 mg IV vancomycin today   Next serum random concentration measured tomorrow morning on 1/8 at 0430 with AM labs    Drug levels (last 3 results):  Recent Labs   Lab Result Units 01/06/23  0505 01/07/23  0526   Vancomycin, Random ug/mL 9.2 18.6       Pharmacy will continue to follow and monitor vancomycin.    Please contact pharmacy at extension [phone] for questions regarding this assessment.    Thank you for the consult,   Tato Ballesteros       Patient brief summary:  Jenniffer Jimenez is a 89 y.o. female initiated on antimicrobial therapy with IV Vancomycin for treatment of skin & soft tissue infection    The patient's current regimen is pulse dosing (~1500mg IV daily X 2 days).     Drug Allergies:   Review of patient's allergies indicates:   Allergen Reactions    Opioids - morphine analogues Other (See Comments)     Bowel issues; bowel obstruction    Tizanidine Other (See Comments)     "Lips were numb,  Almost passed out."    Tramadol Hallucinations    Beta-blockers (beta-adrenergic blocking agts) Other (See Comments)     Can not go on beta blockers for long period of time - due to taking allergy injections    Morphine     Opioids-meperidine and related     Ciprofloxacin Rash       Actual Body Weight:   76.8kg    Renal Function:   Estimated Creatinine Clearance: 27.4 mL/min (based on SCr of 1.4 mg/dL).,     Dialysis Method (if applicable):  N/A    CBC (last 72 hours):  Recent Labs   Lab Result Units 01/04/23  2308 01/05/23  1533 01/06/23  0505 01/07/23  0526   WBC K/uL 7.29 5.03 4.47 4.51   Hemoglobin g/dL 10.4* 9.6* 9.0* 8.5*   Hematocrit " % 31.4* 29.7* 27.6* 26.8*   Platelets K/uL 252 221 216 193   Gran % % 76.0* 68.0 63.3 66.7   Lymph % % 10.0* 16.0* 18.3 14.9*   Mono % % 9.0 13.0 14.8 13.7   Eosinophil % % 2.0 2.0 1.8 1.6   Basophil % % 3.0* 1.0 0.2 0.4   Differential Method  Manual Manual Automated Automated       Metabolic Panel (last 72 hours):  Recent Labs   Lab Result Units 01/04/23  2308 01/05/23  0807 01/05/23  1533 01/05/23  1847 01/06/23  0036 01/06/23  0505 01/06/23  0840 01/06/23  1147 01/06/23  1618 01/06/23  2003 01/07/23  0018 01/07/23  0526   Sodium mmol/L 137  --  137 136 137 139  139 139 135* 134* 134* 133* 135*  135*   Potassium mmol/L 5.9* 5.3* 5.1 5.3*  5.3*  5.3* 5.3*  5.3*  5.3* 5.2*  5.2*  5.2*  5.2* 6.0*  6.0* 5.3*  5.3* 4.9  4.9 5.0  5.0 4.7  4.7 4.9  4.9  4.9   Chloride mmol/L 108  --  109 108 108 110  110 110 105 106 105 104 105  105   CO2 mmol/L 20*  --  21* 21* 22* 20*  20* 20* 23 18* 19* 21* 21*  21*   Glucose mg/dL 127*  --  171* 150* 152* 124*  124* 138* 139* 187* 191* 153* 129*  129*   BUN mg/dL 48*  --  39* 40* 42* 40*  40* 39* 37* 39* 39* 41* 44*  44*   Creatinine mg/dL 1.5*  --  1.4 1.4 1.3 1.3  1.3 1.1 1.2 1.4 1.4 1.3 1.4  1.4   Magnesium mg/dL  --   --  2.1  --   --  2.2  --   --   --   --   --  2.1   Phosphorus mg/dL  --   --   --   --   --  3.8  --   --   --   --   --  4.7*       Vancomycin Administrations:  vancomycin given in the last 96 hours                     vancomycin 1,500 mg in dextrose 5 % 250 mL IVPB (mg) 1,500 mg New Bag 01/06/23 1053    vancomycin 1,500 mg in dextrose 5 % 250 mL IVPB (mg) 1,500 mg New Bag 01/05/23 0406                    Microbiologic Results:  Microbiology Results (last 7 days)       Procedure Component Value Units Date/Time    Blood culture [[id number]] Collected: 01/06/23 1619    Order Status: Completed Specimen: Blood Updated: 01/07/23 0115     Blood Culture, Routine No Growth to date    Narrative:      Site 1    Blood culture [[id number]] Collected:  01/06/23 1619    Order Status: Completed Specimen: Blood Updated: 01/07/23 0115     Blood Culture, Routine No Growth to date    Narrative:      Site 2    Culture, Anaerobe [[id number]]     Order Status: No result Specimen: Wound from Leg, Right     Aerobic culture [[id number]]     Order Status: No result Specimen: Wound from Leg, Right

## 2023-01-07 NOTE — SUBJECTIVE & OBJECTIVE
Interval History: Pt seen and examined by me this morning with nursing at bedside. Mildly hypotensive otherwise VSSAF. NAEON. Pt reports RLE pain and R hip pain are improving since admission. Pt without additional physical complaints at this time.     Review of Systems   Constitutional:  Negative for appetite change, chills and fever.   HENT:  Negative for congestion, trouble swallowing and voice change.    Eyes:  Negative for photophobia and visual disturbance.   Respiratory:  Negative for cough, chest tightness and wheezing.    Cardiovascular:  Positive for leg swelling. Negative for chest pain and palpitations.   Gastrointestinal:  Negative for abdominal pain, constipation, diarrhea, nausea and vomiting.   Endocrine: Negative for polyphagia and polyuria.   Genitourinary:  Negative for decreased urine volume, difficulty urinating, dysuria, flank pain and hematuria.   Musculoskeletal:  Positive for arthralgias (R hip). Negative for back pain and gait problem.   Skin:  Positive for color change and wound.   Neurological:  Negative for dizziness, seizures, syncope, weakness, numbness and headaches.   Psychiatric/Behavioral:  Negative for agitation, behavioral problems and confusion.    Objective:     Vital Signs (Most Recent):  Temp: 98 °F (36.7 °C) (01/07/23 1121)  Pulse: 76 (01/07/23 1121)  Resp: 18 (01/07/23 1155)  BP: (!) 100/58 (01/07/23 1121)  SpO2: 96 % (01/07/23 1121)   Vital Signs (24h Range):  Temp:  [97.3 °F (36.3 °C)-98.2 °F (36.8 °C)] 98 °F (36.7 °C)  Pulse:  [] 76  Resp:  [16-20] 18  SpO2:  [92 %-98 %] 96 %  BP: (100-120)/(52-58) 100/58     Weight: 76.8 kg (169 lb 5 oz)  Body mass index is 28.18 kg/m².    Intake/Output Summary (Last 24 hours) at 1/7/2023 1217  Last data filed at 1/6/2023 2359  Gross per 24 hour   Intake --   Output 550 ml   Net -550 ml        Physical Exam  Vitals and nursing note reviewed.   Constitutional:       General: She is not in acute distress.     Appearance: She is  well-developed.   HENT:      Head: Normocephalic and atraumatic.      Mouth/Throat:      Pharynx: No oropharyngeal exudate.   Eyes:      Extraocular Movements: Extraocular movements intact.      Conjunctiva/sclera: Conjunctivae normal.   Cardiovascular:      Rate and Rhythm: Normal rate and regular rhythm.      Heart sounds: Normal heart sounds.   Pulmonary:      Effort: Pulmonary effort is normal. No respiratory distress.      Breath sounds: No wheezing or rales.   Abdominal:      General: Bowel sounds are normal. There is no distension.      Palpations: Abdomen is soft.      Tenderness: There is no abdominal tenderness.   Musculoskeletal:         General: Swelling and tenderness present. Normal range of motion.      Cervical back: Normal range of motion and neck supple.   Lymphadenopathy:      Cervical: No cervical adenopathy.   Skin:     General: Skin is warm and dry.      Capillary Refill: Capillary refill takes less than 2 seconds.      Findings: Erythema present. No rash.      Comments: RLE dressing removed with green/blue malodorous drainage (less than previous). Underlying skin erythematous, warm, and TTP (improving from previous). Good pulses and perfusion.    Neurological:      Mental Status: She is alert and oriented to person, place, and time.      Cranial Nerves: No cranial nerve deficit.      Sensory: No sensory deficit.      Coordination: Coordination normal.   Psychiatric:         Behavior: Behavior normal.         Thought Content: Thought content normal.         Judgment: Judgment normal.     RLE 1/4/23:      RLE 1/6/23:      RLE 1/7/23:          Significant Labs: All pertinent labs within the past 24 hours have been reviewed.  CBC:   Recent Labs   Lab 01/05/23  1533 01/06/23  0505 01/07/23  0526   WBC 5.03 4.47 4.51   HGB 9.6* 9.0* 8.5*   HCT 29.7* 27.6* 26.8*    216 193       CMP:   Recent Labs   Lab 01/06/23  2003 01/07/23  0018 01/07/23  0526   * 133* 135*  135*   K 5.0  5.0 4.7   "4.7 4.9  4.9  4.9    104 105  105   CO2 19* 21* 21*  21*   * 153* 129*  129*   BUN 39* 41* 44*  44*   CREATININE 1.4 1.3 1.4  1.4   CALCIUM 8.5* 8.4* 8.8  8.8   ANIONGAP 10 8 9  9         Significant Imaging: I have reviewed all pertinent imaging results/findings within the past 24 hours.    US Lower Extremity Veins Bilateral  Narrative: EXAMINATION:  US LOWER EXTREMITY VEINS BILATERAL    CLINICAL HISTORY:  Rule out DVT;    TECHNIQUE:  Duplex and color flow Doppler and dynamic compression was performed of the bilateral lower extremity veins was performed.    COMPARISON:  Ultrasound 11/08/2022 09/25/2022 01/02/2020    FINDINGS:  Right thigh veins: The common femoral, femoral, popliteal, upper greater saphenous, and deep femoral veins are patent and free of thrombus. The veins are normally compressible and have normal phasic flow and augmentation response.    Right calf veins: Posterior tibial vein is not visualized due to overlying bandage.  The remaining visualized calf veins are patent.    Left thigh veins: The common femoral, femoral, popliteal, upper greater saphenous, and deep femoral veins are patent and free of thrombus. The veins are normally compressible and have normal phasic flow and augmentation response.    Left calf veins: The visualized calf veins are patent.    Miscellaneous: Left Baker's cyst measuring 0.7 x 0.6 x 0.4 cm.  Bilateral lower extremities edema.  Impression: No evidence of deep venous thrombosis in either lower extremity.    Subcentimeter left Baker's cyst.    Electronically signed by resident: Ekaterina Gutierrez  Date:    01/05/2023  Time:    11:58    Electronically signed by: Joesph Chery MD  Date:    01/05/2023  Time:    12:12  X-Ray Chest AP Portable  Narrative: EXAMINATION:  XR CHEST AP PORTABLE    CLINICAL HISTORY:  Provided history is "SOB;  ".    TECHNIQUE:  One view of the chest.    COMPARISON:  09/25/2022.    FINDINGS:  Cardiac wires overlie the chest.  " Cardiomediastinal silhouette is mildly enlarged and similar to the prior study.  Atherosclerotic calcifications overlie the aortic arch.  Stable elevation of the left hemidiaphragm.  Coarsened bilateral interstitial lung markings but no confluent area of consolidation.  No large pleural effusion.  No distinct pneumothorax.  Postoperative changes in the left humerus.  Impression: Cardiomegaly and coarsened interstitial lung markings.  No confluent area of consolidation or detrimental change when compared with 09/25/2022.    Electronically signed by: Tye Ferris MD  Date:    01/05/2023  Time:    01:39

## 2023-01-07 NOTE — PROGRESS NOTES
[facility] [address] [place] [address] Medicine  Progress Note    Patient Name: Jenniffer Jimenez  MRN: [id number]  Patient Class: OP- Observation   Admission Date: 1/4/2023  Length of Stay: 0 days  Attending Physician: Nely Chahal MD  Primary Care Provider: Rubi Young DO        Subjective:     Principal Problem:Cellulitis of right lower extremity        HPI:  Jenniffer Jimenez is a 89 y.o. female with a PMHx of HLD, HTN, AF s/p watchman, chronic cellulitis/ wound of her right lower extremity who presents to [facility] for evaluation of worsening redness and pain to her right lwoer extremity. Patient is a poor historian and has difficulty hearing. Per ED note, patient receives wound care for chronic RLE cellulitis. Wound care nuse noted worsening redness, weeping, and pain to her RLE. Apparently patient was supposed to be on ciprofloxacin but had a bad reaction so she has been off of it for the past 2 weeks.  The patient and has not had an alternative antibiotic prescribed but reportedly is supposed to be on an antibiotic. Patient also complains of intermittent shortness of breath recently which mostly occurs on exertion. Denies any fever, nausea, vomiting, chest pain, numbness/tingling, weakness, slurred speech, or recent falls.     ED: AFVSS. No leukocytosis. K 5.9, Cr 1.5 (BL ~1.2). EKG, CXR pending. Given IV vanc.      Overview/Hospital Course:  Pt placed in observation for management of worsening RLE cellulitis. Pt receiving IV vancomycin and clinically improving. Plan to discharge home with home health when clinically stable.       Interval History: Pt seen and examined by me this morning with daughter at bedside. JOSE RAUL WILLSON. Pt reports RLE pain is somewhat improved from previous. Wound undressed with wound care at bedside. Previous dressing with green/blue malodorous drainage. Pt reports R hip pain, currently 9/10, worse with movement and palpation. Per daughter, pt has a history of R greater trochanteric  bursitis, and frequently has this issue. Otherwise she is without complaints at this time.     Review of Systems   Constitutional:  Negative for appetite change, chills and fever.   HENT:  Negative for congestion, trouble swallowing and voice change.    Eyes:  Negative for photophobia and visual disturbance.   Respiratory:  Negative for cough, chest tightness and wheezing.    Cardiovascular:  Positive for leg swelling. Negative for chest pain and palpitations.   Gastrointestinal:  Negative for abdominal pain, constipation, diarrhea, nausea and vomiting.   Endocrine: Negative for polyphagia and polyuria.   Genitourinary:  Negative for decreased urine volume, difficulty urinating, dysuria, flank pain and hematuria.   Musculoskeletal:  Positive for arthralgias (R hip). Negative for back pain and gait problem.   Skin:  Positive for color change and wound.   Neurological:  Negative for dizziness, seizures, syncope, weakness, numbness and headaches.   Psychiatric/Behavioral:  Negative for agitation, behavioral problems and confusion.    Objective:     Vital Signs (Most Recent):  Temp: 98.1 °F (36.7 °C) (01/06/23 1516)  Pulse: 109 (01/06/23 1516)  Resp: 20 (01/06/23 1516)  BP: (!) 106/52 (01/06/23 1516)  SpO2: (!) 92 % (01/06/23 1516)   Vital Signs (24h Range):  Temp:  [97.3 °F (36.3 °C)-98.1 °F (36.7 °C)] 98.1 °F (36.7 °C)  Pulse:  [] 109  Resp:  [17-20] 20  SpO2:  [92 %-97 %] 92 %  BP: (106-159)/(52-83) 106/52     Weight: 76.8 kg (169 lb 5 oz)  Body mass index is 28.18 kg/m².    Intake/Output Summary (Last 24 hours) at 1/6/2023 1538  Last data filed at 1/6/2023 1221  Gross per 24 hour   Intake --   Output 800 ml   Net -800 ml      Physical Exam  Vitals and nursing note reviewed.   Constitutional:       General: She is not in acute distress.     Appearance: She is well-developed.   HENT:      Head: Normocephalic and atraumatic.      Mouth/Throat:      Pharynx: No oropharyngeal exudate.   Eyes:      Extraocular  Movements: Extraocular movements intact.      Conjunctiva/sclera: Conjunctivae normal.   Cardiovascular:      Rate and Rhythm: Normal rate and regular rhythm.      Heart sounds: Normal heart sounds.   Pulmonary:      Effort: Pulmonary effort is normal. No respiratory distress.      Breath sounds: Rales present. No wheezing.      Comments: Coarse BS to BLL  Abdominal:      General: Bowel sounds are normal. There is no distension.      Palpations: Abdomen is soft.      Tenderness: There is no abdominal tenderness.   Musculoskeletal:         General: Swelling and tenderness present. Normal range of motion.      Cervical back: Normal range of motion and neck supple.   Lymphadenopathy:      Cervical: No cervical adenopathy.   Skin:     General: Skin is warm and dry.      Capillary Refill: Capillary refill takes less than 2 seconds.      Findings: Erythema present. No rash.      Comments: RLE dressing removed with green/blue malodorous drainage. Underlying skin erythematous, warm, and TTP (appears improved from previous images). Good pulses and perfusion.    Neurological:      Mental Status: She is alert and oriented to person, place, and time.      Cranial Nerves: No cranial nerve deficit.      Sensory: No sensory deficit.      Coordination: Coordination normal.   Psychiatric:         Behavior: Behavior normal.         Thought Content: Thought content normal.         Judgment: Judgment normal.     RLE 1/4/23:      RLE 1/6/23:        Significant Labs: All pertinent labs within the past 24 hours have been reviewed.  CBC:   Recent Labs   Lab 01/04/23  2308 01/05/23  0050 01/05/23  1533 01/06/23  0505   WBC 7.29  --  5.03 4.47   HGB 10.4*  --  9.6* 9.0*   HCT 31.4* 29* 29.7* 27.6*     --  221 216     CMP:   Recent Labs   Lab 01/06/23  0505 01/06/23  0840 01/06/23  1147     139 139 135*   K 5.2*  5.2*  5.2*  5.2* 6.0*  6.0* 5.3*  5.3*     110 110 105   CO2 20*  20* 20* 23   *  124* 138* 139*  "  BUN 40*  40* 39* 37*   CREATININE 1.3  1.3 1.1 1.2   CALCIUM 9.0  9.0 9.1 9.1   ANIONGAP 9  9 9 7*       Significant Imaging: I have reviewed all pertinent imaging results/findings within the past 24 hours.    US Lower Extremity Veins Bilateral  Narrative: EXAMINATION:  US LOWER EXTREMITY VEINS BILATERAL    CLINICAL HISTORY:  Rule out DVT;    TECHNIQUE:  Duplex and color flow Doppler and dynamic compression was performed of the bilateral lower extremity veins was performed.    COMPARISON:  Ultrasound 11/08/2022 09/25/2022 01/02/2020    FINDINGS:  Right thigh veins: The common femoral, femoral, popliteal, upper greater saphenous, and deep femoral veins are patent and free of thrombus. The veins are normally compressible and have normal phasic flow and augmentation response.    Right calf veins: Posterior tibial vein is not visualized due to overlying bandage.  The remaining visualized calf veins are patent.    Left thigh veins: The common femoral, femoral, popliteal, upper greater saphenous, and deep femoral veins are patent and free of thrombus. The veins are normally compressible and have normal phasic flow and augmentation response.    Left calf veins: The visualized calf veins are patent.    Miscellaneous: Left Baker's cyst measuring 0.7 x 0.6 x 0.4 cm.  Bilateral lower extremities edema.  Impression: No evidence of deep venous thrombosis in either lower extremity.    Subcentimeter left Baker's cyst.    Electronically signed by resident: Ekaterina Gutierrez  Date:    01/05/2023  Time:    11:58    Electronically signed by: Joesph Chery MD  Date:    01/05/2023  Time:    12:12  X-Ray Chest AP Portable  Narrative: EXAMINATION:  XR CHEST AP PORTABLE    CLINICAL HISTORY:  Provided history is "SOB;  ".    TECHNIQUE:  One view of the chest.    COMPARISON:  09/25/2022.    FINDINGS:  Cardiac wires overlie the chest.  Cardiomediastinal silhouette is mildly enlarged and similar to the prior study.  Atherosclerotic " calcifications overlie the aortic arch.  Stable elevation of the left hemidiaphragm.  Coarsened bilateral interstitial lung markings but no confluent area of consolidation.  No large pleural effusion.  No distinct pneumothorax.  Postoperative changes in the left humerus.  Impression: Cardiomegaly and coarsened interstitial lung markings.  No confluent area of consolidation or detrimental change when compared with 09/25/2022.    Electronically signed by: Tye Ferris MD  Date:    01/05/2023  Time:    01:39         Assessment/Plan:      * Cellulitis of right lower extremity  - acute on chronic issue  - afebrile without leukocytosis  - ESR, CRP elevated  - continue IV vanc (pharmD to dose), initiate cipro for gram - and pseudomonas coverage   - pt with rash on day 7/10 of recent cipro course though rash unlikely to be 2/2 cipro given multiple previous courses of cipro without complication or reaction    - benadryl added prn itching, plan to monitor closely for reaction   - wound care consulted and recommending BID dressing changes  - LE US negative for DVT  - elevated extremity  - pain control   - consider advanced imaging if no improvement in AM    Trochanteric bursitis of right hip  - Scheduled tylenol  - Lidocaine jelly  - Voltaren gel     Hyperkalemia  Stage 3b chronic kidney disease  - K 5.9 -> 5.3 -> 4.9  - No EKG changes  - likely 2/2 JOSHUA  - shifted with albuterol neb and scheduled lokelma  - restart lasix as above  - monitor on tele    JOSHUA (acute kidney injury)  - Creatinine today Estimated Creatinine Clearance: 27.4 mL/min (based on SCr of 1.4 mg/dL). (baseline 1.2)  - Suspect pt has prerenal azotemia 2/2 decreased PO intake  - UA pending  - Monitor BMP daily, replete electrolytes if necessary  - Renally adjust drugs to CrCl; avoid nephrotoxic drugs   -  Estimated Creatinine Clearance: 27.4 mL/min (based on SCr of 1.4 mg/dL).   - s/p 500 cc bolus  - Monitor I/Os  - Consider renal U/S if no improvement in  24-48 hrs    Chronic diastolic heart failure  - pt complaining of occasional SOB c/f volume overload, rales on exam this am  -   - CXR with coarse interstitial lung markings and cardiomegaly   - start home lasix today  - strict I/Os, daily weights    Chronic atrial fibrillation  - no longer on eliquis s/p Watchman implantation  - in rate controlled afib on tele    Primary hypertension  - hold aldactone given JOSHUA    Pure hypercholesterolemia  - continue ASA, statin     VTE Risk Mitigation (From admission, onward)         Ordered     enoxaparin injection 30 mg  Daily         01/05/23 0040     IP VTE HIGH RISK PATIENT  Once         01/05/23 0040                Discharge Planning   GILES: 1/9/2023     Code Status: Full Code   Is the patient medically ready for discharge?: No    Reason for patient still in hospital (select all that apply): Patient trending condition, Treatment and Consult recommendations  Discharge Plan A: Home Health   Discharge Delays:  (Medical clearance)              JUSTINO ShinC  Department of Hospital Medicine   [facility]

## 2023-01-07 NOTE — ASSESSMENT & PLAN NOTE
- Creatinine today Estimated Creatinine Clearance: 27.4 mL/min (based on SCr of 1.4 mg/dL). (baseline 1.2)  - Suspect pt has prerenal azotemia 2/2 decreased PO intake  - UA pending  - Monitor BMP daily, replete electrolytes if necessary  - Renally adjust drugs to CrCl; avoid nephrotoxic drugs   -  Estimated Creatinine Clearance: 27.4 mL/min (based on SCr of 1.4 mg/dL).   - s/p 500 cc bolus  - Monitor I/Os  - Consider renal U/S if no improvement in 24-48 hrs

## 2023-01-07 NOTE — ASSESSMENT & PLAN NOTE
- acute on chronic issue  - afebrile without leukocytosis  - ESR, CRP elevated  - continue IV vanc (pharmD to dose), continue cipro for gram - and pseudomonas coverage   - pt with rash on day 7/10 of recent cipro course though rash unlikely to be 2/2 cipro given multiple previous courses of cipro without complication or reaction    - benadryl added, plan to monitor closely for reaction - none reported at this time  - wound care consulted and recommending BID dressing changes  - LE US negative for DVT  - elevate extremity  - continue pain control

## 2023-01-07 NOTE — ASSESSMENT & PLAN NOTE
- pt complaining of occasional SOB c/f volume overload  -   - CXR with coarse interstitial lung markings and cardiomegaly   - last echo below  - Continue home lasix   - strict I/Os, daily weights    Results for orders placed during the hospital encounter of 10/07/22  Echo  Interpretation Summary  · The left ventricle is normal in size with concentric remodeling and normal systolic function.  · The estimated ejection fraction is 55-60%.  · Grade III left ventricular diastolic dysfunction.  · Mild mitral regurgitation.  · Normal right ventricular size with mildly reduced right ventricular systolic function.  · Severe tricuspid regurgitation.  · The estimated PA systolic pressure is 52 mmHg. PASP may be under-estimated due to TR severity.  · Elevated central venous pressure (15 mmHg).  · There is pulmonary hypertension.  · Severe left atrial enlargement.

## 2023-01-07 NOTE — ASSESSMENT & PLAN NOTE
- pt complaining of occasional SOB c/f volume overload, rales on exam this am  -   - CXR with coarse interstitial lung markings and cardiomegaly   - start home lasix today  - strict I/Os, daily weights

## 2023-01-07 NOTE — ASSESSMENT & PLAN NOTE
- acute on chronic issue  - afebrile without leukocytosis  - ESR, CRP elevated  - continue IV vanc (pharmD to dose), initiate cipro for gram - and pseudomonas coverage   - pt with rash on day 7/10 of recent cipro course though rash unlikely to be 2/2 cipro given multiple previous courses of cipro without complication or reaction    - benadryl added prn itching, plan to monitor closely for reaction   - wound care consulted and recommending BID dressing changes  - LE US negative for DVT  - elevated extremity  - pain control   - consider advanced imaging if no improvement in AM

## 2023-01-07 NOTE — ASSESSMENT & PLAN NOTE
Stage 3b chronic kidney disease  - K 5.9 -> 5.3 -> 4.9  - No EKG changes  - likely 2/2 JOSHUA  - shifted with albuterol neb and scheduled lokelma  - continue lasix as above  - monitor on tele

## 2023-01-07 NOTE — ASSESSMENT & PLAN NOTE
Stage 3b chronic kidney disease  - K 5.9 -> 5.3 -> 4.9  - No EKG changes  - likely 2/2 JOSHUA  - shifted with albuterol neb and scheduled lokelma  - restart lasix as above  - monitor on tele

## 2023-01-07 NOTE — PROGRESS NOTES
[facility] [address] [place] [address] Medicine  Progress Note    Patient Name: Jenniffer Jimenez  MRN: [id number]  Patient Class: OP- Observation   Admission Date: 1/4/2023  Length of Stay: 0 days  Attending Physician: Nely Chahal MD  Primary Care Provider: Rubi Young DO        Subjective:     Principal Problem:Cellulitis of right lower extremity        HPI:  Jenniffer Jimenez is a 89 y.o. female with a PMHx of HLD, HTN, AF s/p watchman, chronic cellulitis/ wound of her right lower extremity who presents to [facility] for evaluation of worsening redness and pain to her right lwoer extremity. Patient is a poor historian and has difficulty hearing. Per ED note, patient receives wound care for chronic RLE cellulitis. Wound care nuse noted worsening redness, weeping, and pain to her RLE. Apparently patient was supposed to be on ciprofloxacin but had a bad reaction so she has been off of it for the past 2 weeks.  The patient and has not had an alternative antibiotic prescribed but reportedly is supposed to be on an antibiotic. Patient also complains of intermittent shortness of breath recently which mostly occurs on exertion. Denies any fever, nausea, vomiting, chest pain, numbness/tingling, weakness, slurred speech, or recent falls.     ED: AFVSS. No leukocytosis. K 5.9, Cr 1.5 (BL ~1.2). EKG, CXR pending. Given IV vanc.      Overview/Hospital Course:  Pt placed in observation for management of worsening RLE cellulitis. Pt receiving IV vancomycin and ciprofloxacin and clinically improving. Plan to discharge home with home health when clinically stable.       Interval History: Pt seen and examined by me this morning with nursing at bedside. Mildly hypotensive otherwise VSSAF. NAEON. Pt reports RLE pain and R hip pain are improving since admission. Pt without additional physical complaints at this time.     Review of Systems   Constitutional:  Negative for appetite change, chills and fever.   HENT:  Negative for  congestion, trouble swallowing and voice change.    Eyes:  Negative for photophobia and visual disturbance.   Respiratory:  Negative for cough, chest tightness and wheezing.    Cardiovascular:  Positive for leg swelling. Negative for chest pain and palpitations.   Gastrointestinal:  Negative for abdominal pain, constipation, diarrhea, nausea and vomiting.   Endocrine: Negative for polyphagia and polyuria.   Genitourinary:  Negative for decreased urine volume, difficulty urinating, dysuria, flank pain and hematuria.   Musculoskeletal:  Positive for arthralgias (R hip). Negative for back pain and gait problem.   Skin:  Positive for color change and wound.   Neurological:  Negative for dizziness, seizures, syncope, weakness, numbness and headaches.   Psychiatric/Behavioral:  Negative for agitation, behavioral problems and confusion.    Objective:     Vital Signs (Most Recent):  Temp: 98 °F (36.7 °C) (01/07/23 1121)  Pulse: 76 (01/07/23 1121)  Resp: 18 (01/07/23 1155)  BP: (!) 100/58 (01/07/23 1121)  SpO2: 96 % (01/07/23 1121)   Vital Signs (24h Range):  Temp:  [97.3 °F (36.3 °C)-98.2 °F (36.8 °C)] 98 °F (36.7 °C)  Pulse:  [] 76  Resp:  [16-20] 18  SpO2:  [92 %-98 %] 96 %  BP: (100-120)/(52-58) 100/58     Weight: 76.8 kg (169 lb 5 oz)  Body mass index is 28.18 kg/m².    Intake/Output Summary (Last 24 hours) at 1/7/2023 1217  Last data filed at 1/6/2023 2359  Gross per 24 hour   Intake --   Output 550 ml   Net -550 ml        Physical Exam  Vitals and nursing note reviewed.   Constitutional:       General: She is not in acute distress.     Appearance: She is well-developed.   HENT:      Head: Normocephalic and atraumatic.      Mouth/Throat:      Pharynx: No oropharyngeal exudate.   Eyes:      Extraocular Movements: Extraocular movements intact.      Conjunctiva/sclera: Conjunctivae normal.   Cardiovascular:      Rate and Rhythm: Normal rate and regular rhythm.      Heart sounds: Normal heart sounds.   Pulmonary:       Effort: Pulmonary effort is normal. No respiratory distress.      Breath sounds: No wheezing or rales.   Abdominal:      General: Bowel sounds are normal. There is no distension.      Palpations: Abdomen is soft.      Tenderness: There is no abdominal tenderness.   Musculoskeletal:         General: Swelling and tenderness present. Normal range of motion.      Cervical back: Normal range of motion and neck supple.   Lymphadenopathy:      Cervical: No cervical adenopathy.   Skin:     General: Skin is warm and dry.      Capillary Refill: Capillary refill takes less than 2 seconds.      Findings: Erythema present. No rash.      Comments: RLE dressing removed with green/blue malodorous drainage (less than previous). Underlying skin erythematous, warm, and TTP (improving from previous). Good pulses and perfusion.    Neurological:      Mental Status: She is alert and oriented to person, place, and time.      Cranial Nerves: No cranial nerve deficit.      Sensory: No sensory deficit.      Coordination: Coordination normal.   Psychiatric:         Behavior: Behavior normal.         Thought Content: Thought content normal.         Judgment: Judgment normal.     RLE 1/4/23:      RLE 1/6/23:      RLE 1/7/23:          Significant Labs: All pertinent labs within the past 24 hours have been reviewed.  CBC:   Recent Labs   Lab 01/05/23  1533 01/06/23  0505 01/07/23  0526   WBC 5.03 4.47 4.51   HGB 9.6* 9.0* 8.5*   HCT 29.7* 27.6* 26.8*    216 193       CMP:   Recent Labs   Lab 01/06/23 2003 01/07/23  0018 01/07/23  0526   * 133* 135*  135*   K 5.0  5.0 4.7  4.7 4.9  4.9  4.9    104 105  105   CO2 19* 21* 21*  21*   * 153* 129*  129*   BUN 39* 41* 44*  44*   CREATININE 1.4 1.3 1.4  1.4   CALCIUM 8.5* 8.4* 8.8  8.8   ANIONGAP 10 8 9  9         Significant Imaging: I have reviewed all pertinent imaging results/findings within the past 24 hours.    US Lower Extremity Veins Bilateral  Narrative:  "EXAMINATION:  US LOWER EXTREMITY VEINS BILATERAL    CLINICAL HISTORY:  Rule out DVT;    TECHNIQUE:  Duplex and color flow Doppler and dynamic compression was performed of the bilateral lower extremity veins was performed.    COMPARISON:  Ultrasound 11/08/2022 09/25/2022 01/02/2020    FINDINGS:  Right thigh veins: The common femoral, femoral, popliteal, upper greater saphenous, and deep femoral veins are patent and free of thrombus. The veins are normally compressible and have normal phasic flow and augmentation response.    Right calf veins: Posterior tibial vein is not visualized due to overlying bandage.  The remaining visualized calf veins are patent.    Left thigh veins: The common femoral, femoral, popliteal, upper greater saphenous, and deep femoral veins are patent and free of thrombus. The veins are normally compressible and have normal phasic flow and augmentation response.    Left calf veins: The visualized calf veins are patent.    Miscellaneous: Left Baker's cyst measuring 0.7 x 0.6 x 0.4 cm.  Bilateral lower extremities edema.  Impression: No evidence of deep venous thrombosis in either lower extremity.    Subcentimeter left Baker's cyst.    Electronically signed by resident: Ekaterina Gutierrez  Date:    01/05/2023  Time:    11:58    Electronically signed by: Joesph Chery MD  Date:    01/05/2023  Time:    12:12  X-Ray Chest AP Portable  Narrative: EXAMINATION:  XR CHEST AP PORTABLE    CLINICAL HISTORY:  Provided history is "SOB;  ".    TECHNIQUE:  One view of the chest.    COMPARISON:  09/25/2022.    FINDINGS:  Cardiac wires overlie the chest.  Cardiomediastinal silhouette is mildly enlarged and similar to the prior study.  Atherosclerotic calcifications overlie the aortic arch.  Stable elevation of the left hemidiaphragm.  Coarsened bilateral interstitial lung markings but no confluent area of consolidation.  No large pleural effusion.  No distinct pneumothorax.  Postoperative changes in the left " humerus.  Impression: Cardiomegaly and coarsened interstitial lung markings.  No confluent area of consolidation or detrimental change when compared with 09/25/2022.    Electronically signed by: Tye Ferris MD  Date:    01/05/2023  Time:    01:39         Assessment/Plan:      * Cellulitis of right lower extremity  - acute on chronic issue  - afebrile without leukocytosis  - ESR, CRP elevated  - continue IV vanc (pharmD to dose), continue cipro for gram - and pseudomonas coverage   - pt with rash on day 7/10 of recent cipro course though rash unlikely to be 2/2 cipro given multiple previous courses of cipro without complication or reaction    - benadryl added, plan to monitor closely for reaction - none reported at this time  - wound care consulted and recommending BID dressing changes  - LE US negative for DVT  - elevate extremity  - continue pain control     Trochanteric bursitis of right hip  - Scheduled tylenol  - Lidocaine jelly  - Voltaren gel     Hyperkalemia  Stage 3b chronic kidney disease  - K 5.9 -> 5.3 -> 4.9  - No EKG changes  - likely 2/2 JOSHUA  - shifted with albuterol neb and scheduled lokelma  - continue lasix as above  - monitor on tele    JOSHUA (acute kidney injury)  - Creatinine 1.5 on admission, Cr today Estimated Creatinine Clearance: 27.4 mL/min (based on SCr of 1.4 mg/dL). (baseline 1.2-1.4)  - Suspect pt has prerenal azotemia 2/2 decreased PO intake  - UA pending  - Monitor BMP daily, replete electrolytes if necessary  - Renally adjust drugs to CrCl; avoid nephrotoxic drugs  - s/p 500 cc bolus  - Monitor I/Os  - Consider renal U/S if no improvement in 24-48 hrs    Chronic diastolic heart failure  - pt complaining of occasional SOB c/f volume overload  -   - CXR with coarse interstitial lung markings and cardiomegaly   - last echo below  - Continue home lasix   - strict I/Os, daily weights    Results for orders placed during the hospital encounter of 10/07/22  Echo  Interpretation  Summary  · The left ventricle is normal in size with concentric remodeling and normal systolic function.  · The estimated ejection fraction is 55-60%.  · Grade III left ventricular diastolic dysfunction.  · Mild mitral regurgitation.  · Normal right ventricular size with mildly reduced right ventricular systolic function.  · Severe tricuspid regurgitation.  · The estimated PA systolic pressure is 52 mmHg. PASP may be under-estimated due to TR severity.  · Elevated central venous pressure (15 mmHg).  · There is pulmonary hypertension.  · Severe left atrial enlargement.    Chronic atrial fibrillation  - no longer on eliquis s/p Watchman implantation  - in rate controlled afib on tele    Primary hypertension  - hold aldactone given JOSHUA    Pure hypercholesterolemia  - continue ASA, statin       VTE Risk Mitigation (From admission, onward)         Ordered     enoxaparin injection 30 mg  Daily         01/05/23 0040     IP VTE HIGH RISK PATIENT  Once         01/05/23 0040                Discharge Planning   GILES: 1/9/2023     Code Status: Full Code   Is the patient medically ready for discharge?: No    Reason for patient still in hospital (select all that apply): Patient trending condition and Treatment  Discharge Plan A: Home Health   Discharge Delays:  (Medical clearance)              JUSTINO ShinC  Department of Hospital Medicine   [facility]

## 2023-01-07 NOTE — ASSESSMENT & PLAN NOTE
- Creatinine 1.5 on admission, Cr today Estimated Creatinine Clearance: 27.4 mL/min (based on SCr of 1.4 mg/dL). (baseline 1.2-1.4)  - Suspect pt has prerenal azotemia 2/2 decreased PO intake  - UA pending  - Monitor BMP daily, replete electrolytes if necessary  - Renally adjust drugs to CrCl; avoid nephrotoxic drugs  - s/p 500 cc bolus  - Monitor I/Os  - Consider renal U/S if no improvement in 24-48 hrs

## 2023-01-08 PROBLEM — L30.4 INTERTRIGO: Status: ACTIVE | Noted: 2023-01-08

## 2023-01-08 LAB
ANION GAP SERPL CALC-SCNC: 10 MMOL/L (ref 8–16)
ANION GAP SERPL CALC-SCNC: 11 MMOL/L (ref 8–16)
ANION GAP SERPL CALC-SCNC: 12 MMOL/L (ref 8–16)
ANION GAP SERPL CALC-SCNC: 12 MMOL/L (ref 8–16)
ANION GAP SERPL CALC-SCNC: 8 MMOL/L (ref 8–16)
ANION GAP SERPL CALC-SCNC: 9 MMOL/L (ref 8–16)
ANISOCYTOSIS BLD QL SMEAR: SLIGHT
BASOPHILS # BLD AUTO: ABNORMAL K/UL (ref 0–0.2)
BASOPHILS NFR BLD: 0 % (ref 0–1.9)
BUN SERPL-MCNC: 47 MG/DL (ref 8–23)
BUN SERPL-MCNC: 48 MG/DL (ref 8–23)
BUN SERPL-MCNC: 48 MG/DL (ref 8–23)
BUN SERPL-MCNC: 49 MG/DL (ref 8–23)
BUN SERPL-MCNC: 49 MG/DL (ref 8–23)
BUN SERPL-MCNC: 52 MG/DL (ref 8–23)
CALCIUM SERPL-MCNC: 8.6 MG/DL (ref 8.7–10.5)
CALCIUM SERPL-MCNC: 8.8 MG/DL (ref 8.7–10.5)
CALCIUM SERPL-MCNC: 8.9 MG/DL (ref 8.7–10.5)
CALCIUM SERPL-MCNC: 8.9 MG/DL (ref 8.7–10.5)
CALCIUM SERPL-MCNC: 9 MG/DL (ref 8.7–10.5)
CALCIUM SERPL-MCNC: 9 MG/DL (ref 8.7–10.5)
CHLORIDE SERPL-SCNC: 103 MMOL/L (ref 95–110)
CHLORIDE SERPL-SCNC: 104 MMOL/L (ref 95–110)
CHLORIDE SERPL-SCNC: 105 MMOL/L (ref 95–110)
CHLORIDE SERPL-SCNC: 105 MMOL/L (ref 95–110)
CO2 SERPL-SCNC: 17 MMOL/L (ref 23–29)
CO2 SERPL-SCNC: 18 MMOL/L (ref 23–29)
CO2 SERPL-SCNC: 19 MMOL/L (ref 23–29)
CO2 SERPL-SCNC: 20 MMOL/L (ref 23–29)
CO2 SERPL-SCNC: 22 MMOL/L (ref 23–29)
CO2 SERPL-SCNC: 22 MMOL/L (ref 23–29)
CREAT SERPL-MCNC: 1.4 MG/DL (ref 0.5–1.4)
CREAT SERPL-MCNC: 1.5 MG/DL (ref 0.5–1.4)
CREAT SERPL-MCNC: 1.6 MG/DL (ref 0.5–1.4)
DIFFERENTIAL METHOD: ABNORMAL
EOSINOPHIL # BLD AUTO: ABNORMAL K/UL (ref 0–0.5)
EOSINOPHIL NFR BLD: 1 % (ref 0–8)
ERYTHROCYTE [DISTWIDTH] IN BLOOD BY AUTOMATED COUNT: 16.3 % (ref 11.5–14.5)
EST. GFR  (NO RACE VARIABLE): 30.4 ML/MIN/1.73 M^2
EST. GFR  (NO RACE VARIABLE): 32.9 ML/MIN/1.73 M^2
EST. GFR  (NO RACE VARIABLE): 35.7 ML/MIN/1.73 M^2
GLUCOSE SERPL-MCNC: 105 MG/DL (ref 70–110)
GLUCOSE SERPL-MCNC: 109 MG/DL (ref 70–110)
GLUCOSE SERPL-MCNC: 117 MG/DL (ref 70–110)
GLUCOSE SERPL-MCNC: 136 MG/DL (ref 70–110)
GLUCOSE SERPL-MCNC: 142 MG/DL (ref 70–110)
GLUCOSE SERPL-MCNC: 173 MG/DL (ref 70–110)
HCT VFR BLD AUTO: 28.3 % (ref 37–48.5)
HGB BLD-MCNC: 9.2 G/DL (ref 12–16)
HYPOCHROMIA BLD QL SMEAR: ABNORMAL
IMM GRANULOCYTES # BLD AUTO: ABNORMAL K/UL (ref 0–0.04)
IMM GRANULOCYTES NFR BLD AUTO: ABNORMAL % (ref 0–0.5)
LYMPHOCYTES # BLD AUTO: ABNORMAL K/UL (ref 1–4.8)
LYMPHOCYTES NFR BLD: 11 % (ref 18–48)
MCH RBC QN AUTO: 28.9 PG (ref 27–31)
MCHC RBC AUTO-ENTMCNC: 32.5 G/DL (ref 32–36)
MCV RBC AUTO: 89 FL (ref 82–98)
MONOCYTES # BLD AUTO: ABNORMAL K/UL (ref 0.3–1)
MONOCYTES NFR BLD: 4 % (ref 4–15)
NEUTROPHILS NFR BLD: 83 % (ref 38–73)
NEUTS BAND NFR BLD MANUAL: 1 %
NRBC BLD-RTO: 0 /100 WBC
OVALOCYTES BLD QL SMEAR: ABNORMAL
PLATELET # BLD AUTO: 237 K/UL (ref 150–450)
PMV BLD AUTO: 10.2 FL (ref 9.2–12.9)
POIKILOCYTOSIS BLD QL SMEAR: SLIGHT
POLYCHROMASIA BLD QL SMEAR: ABNORMAL
POTASSIUM SERPL-SCNC: 4.4 MMOL/L (ref 3.5–5.1)
POTASSIUM SERPL-SCNC: 4.5 MMOL/L (ref 3.5–5.1)
POTASSIUM SERPL-SCNC: 4.5 MMOL/L (ref 3.5–5.1)
POTASSIUM SERPL-SCNC: 4.8 MMOL/L (ref 3.5–5.1)
POTASSIUM SERPL-SCNC: 4.8 MMOL/L (ref 3.5–5.1)
POTASSIUM SERPL-SCNC: 5 MMOL/L (ref 3.5–5.1)
POTASSIUM SERPL-SCNC: 5 MMOL/L (ref 3.5–5.1)
POTASSIUM SERPL-SCNC: 5.3 MMOL/L (ref 3.5–5.1)
POTASSIUM SERPL-SCNC: 5.3 MMOL/L (ref 3.5–5.1)
RBC # BLD AUTO: 3.18 M/UL (ref 4–5.4)
SODIUM SERPL-SCNC: 133 MMOL/L (ref 136–145)
SODIUM SERPL-SCNC: 134 MMOL/L (ref 136–145)
SODIUM SERPL-SCNC: 136 MMOL/L (ref 136–145)
VANCOMYCIN SERPL-MCNC: 21.8 UG/ML
WBC # BLD AUTO: 4.31 K/UL (ref 3.9–12.7)

## 2023-01-08 PROCEDURE — 36415 COLL VENOUS BLD VENIPUNCTURE: CPT | Mod: HCNC | Performed by: HOSPITALIST

## 2023-01-08 PROCEDURE — 93005 ELECTROCARDIOGRAM TRACING: CPT | Mod: HCNC

## 2023-01-08 PROCEDURE — 25000003 PHARM REV CODE 250: Mod: HCNC | Performed by: PHYSICIAN ASSISTANT

## 2023-01-08 PROCEDURE — 25000003 PHARM REV CODE 250: Mod: HCNC | Performed by: HOSPITALIST

## 2023-01-08 PROCEDURE — 25000003 PHARM REV CODE 250: Mod: HCNC

## 2023-01-08 PROCEDURE — 63600175 PHARM REV CODE 636 W HCPCS: Mod: HCNC | Performed by: PHYSICIAN ASSISTANT

## 2023-01-08 PROCEDURE — 80048 BASIC METABOLIC PNL TOTAL CA: CPT | Mod: 91,HCNC

## 2023-01-08 PROCEDURE — 96372 THER/PROPH/DIAG INJ SC/IM: CPT | Performed by: PHYSICIAN ASSISTANT

## 2023-01-08 PROCEDURE — G0378 HOSPITAL OBSERVATION PER HR: HCPCS | Mod: HCNC

## 2023-01-08 PROCEDURE — 80202 ASSAY OF VANCOMYCIN: CPT | Mod: HCNC | Performed by: HOSPITALIST

## 2023-01-08 PROCEDURE — 96372 THER/PROPH/DIAG INJ SC/IM: CPT | Performed by: NURSE PRACTITIONER

## 2023-01-08 PROCEDURE — 85007 BL SMEAR W/DIFF WBC COUNT: CPT | Mod: NCS,HCNC

## 2023-01-08 PROCEDURE — 25000003 PHARM REV CODE 250: Mod: HCNC | Performed by: NURSE PRACTITIONER

## 2023-01-08 PROCEDURE — 99233 PR SUBSEQUENT HOSPITAL CARE,LEVL III: ICD-10-PCS | Mod: HCNC,,,

## 2023-01-08 PROCEDURE — 85027 COMPLETE CBC AUTOMATED: CPT | Mod: HCNC

## 2023-01-08 PROCEDURE — 93010 EKG 12-LEAD: ICD-10-PCS | Mod: HCNC,,, | Performed by: INTERNAL MEDICINE

## 2023-01-08 PROCEDURE — S0166 INJ OLANZAPINE 2.5MG: HCPCS | Mod: HCNC | Performed by: NURSE PRACTITIONER

## 2023-01-08 PROCEDURE — 36415 COLL VENOUS BLD VENIPUNCTURE: CPT | Mod: HCNC

## 2023-01-08 PROCEDURE — 93010 ELECTROCARDIOGRAM REPORT: CPT | Mod: HCNC,,, | Performed by: INTERNAL MEDICINE

## 2023-01-08 PROCEDURE — 99233 SBSQ HOSP IP/OBS HIGH 50: CPT | Mod: HCNC,,,

## 2023-01-08 RX ORDER — POLYETHYLENE GLYCOL 3350 17 G/17G
17 POWDER, FOR SOLUTION ORAL 2 TIMES DAILY
Status: DISCONTINUED | OUTPATIENT
Start: 2023-01-08 | End: 2023-01-24 | Stop reason: HOSPADM

## 2023-01-08 RX ORDER — HYDROXYZINE HYDROCHLORIDE 25 MG/1
25 TABLET, FILM COATED ORAL 3 TIMES DAILY PRN
Status: DISCONTINUED | OUTPATIENT
Start: 2023-01-08 | End: 2023-01-08

## 2023-01-08 RX ORDER — SODIUM CHLORIDE 9 MG/ML
INJECTION, SOLUTION INTRAVENOUS CONTINUOUS
Status: ACTIVE | OUTPATIENT
Start: 2023-01-08 | End: 2023-01-09

## 2023-01-08 RX ORDER — DIPHENHYDRAMINE HCL 25 MG
25 CAPSULE ORAL EVERY 6 HOURS
Status: DISCONTINUED | OUTPATIENT
Start: 2023-01-08 | End: 2023-01-09

## 2023-01-08 RX ORDER — HYDROXYZINE PAMOATE 25 MG/1
25 CAPSULE ORAL EVERY 8 HOURS PRN
Status: DISCONTINUED | OUTPATIENT
Start: 2023-01-08 | End: 2023-01-08

## 2023-01-08 RX ORDER — OLANZAPINE 10 MG/2ML
2.5 INJECTION, POWDER, FOR SOLUTION INTRAMUSCULAR ONCE AS NEEDED
Status: COMPLETED | OUTPATIENT
Start: 2023-01-08 | End: 2023-01-08

## 2023-01-08 RX ORDER — DOXYLAMINE SUCCINATE 25 MG
TABLET ORAL 2 TIMES DAILY
Status: DISCONTINUED | OUTPATIENT
Start: 2023-01-08 | End: 2023-01-17

## 2023-01-08 RX ORDER — HYDROXYZINE HYDROCHLORIDE 25 MG/1
25 TABLET, FILM COATED ORAL 3 TIMES DAILY PRN
Status: DISCONTINUED | OUTPATIENT
Start: 2023-01-08 | End: 2023-01-09

## 2023-01-08 RX ORDER — SENNOSIDES 8.6 MG/1
8.6 TABLET ORAL DAILY
Status: DISCONTINUED | OUTPATIENT
Start: 2023-01-08 | End: 2023-01-24 | Stop reason: HOSPADM

## 2023-01-08 RX ORDER — OXYCODONE HYDROCHLORIDE 10 MG/1
10 TABLET ORAL EVERY 6 HOURS PRN
Status: DISCONTINUED | OUTPATIENT
Start: 2023-01-08 | End: 2023-01-09

## 2023-01-08 RX ADMIN — SILVER SULFADIAZINE: 10 CREAM TOPICAL at 08:01

## 2023-01-08 RX ADMIN — SODIUM ZIRCONIUM CYCLOSILICATE 10 G: 10 POWDER, FOR SUSPENSION ORAL at 09:01

## 2023-01-08 RX ADMIN — DICLOFENAC SODIUM 2 G: 10 GEL TOPICAL at 08:01

## 2023-01-08 RX ADMIN — DIPHENHYDRAMINE HYDROCHLORIDE 25 MG: 25 CAPSULE ORAL at 05:01

## 2023-01-08 RX ADMIN — ASPIRIN 81 MG: 81 TABLET, COATED ORAL at 09:01

## 2023-01-08 RX ADMIN — ACETAMINOPHEN 1000 MG: 500 TABLET ORAL at 05:01

## 2023-01-08 RX ADMIN — OXYCODONE HYDROCHLORIDE 15 MG: 5 TABLET ORAL at 09:01

## 2023-01-08 RX ADMIN — CETIRIZINE HYDROCHLORIDE 5 MG: 5 TABLET, FILM COATED ORAL at 08:01

## 2023-01-08 RX ADMIN — OXYCODONE 5 MG: 5 TABLET ORAL at 05:01

## 2023-01-08 RX ADMIN — FUROSEMIDE 20 MG: 20 TABLET ORAL at 09:01

## 2023-01-08 RX ADMIN — POLYETHYLENE GLYCOL 3350 17 G: 17 POWDER, FOR SOLUTION ORAL at 08:01

## 2023-01-08 RX ADMIN — OLANZAPINE 2.5 MG: 10 INJECTION, POWDER, FOR SOLUTION INTRAMUSCULAR at 09:01

## 2023-01-08 RX ADMIN — DIPHENHYDRAMINE HYDROCHLORIDE 25 MG: 25 CAPSULE ORAL at 11:01

## 2023-01-08 RX ADMIN — SENNOSIDES 8.6 MG: 8.6 TABLET, FILM COATED ORAL at 03:01

## 2023-01-08 RX ADMIN — QUETIAPINE FUMARATE 12.5 MG: 25 TABLET ORAL at 07:01

## 2023-01-08 RX ADMIN — MICONAZOLE NITRATE: 20 CREAM TOPICAL at 08:01

## 2023-01-08 RX ADMIN — SILVER SULFADIAZINE: 10 CREAM TOPICAL at 09:01

## 2023-01-08 RX ADMIN — SODIUM CHLORIDE: 9 INJECTION, SOLUTION INTRAVENOUS at 03:01

## 2023-01-08 RX ADMIN — SODIUM ZIRCONIUM CYCLOSILICATE 10 G: 10 POWDER, FOR SUSPENSION ORAL at 03:01

## 2023-01-08 RX ADMIN — PRAVASTATIN SODIUM 40 MG: 40 TABLET ORAL at 09:01

## 2023-01-08 RX ADMIN — CIPROFLOXACIN HYDROCHLORIDE 500 MG: 500 TABLET, FILM COATED ORAL at 09:01

## 2023-01-08 RX ADMIN — FAMOTIDINE 20 MG: 20 TABLET ORAL at 09:01

## 2023-01-08 RX ADMIN — DICLOFENAC SODIUM 2 G: 10 GEL TOPICAL at 09:01

## 2023-01-08 RX ADMIN — ENOXAPARIN SODIUM 30 MG: 30 INJECTION SUBCUTANEOUS at 05:01

## 2023-01-08 RX ADMIN — SODIUM ZIRCONIUM CYCLOSILICATE 10 G: 10 POWDER, FOR SUSPENSION ORAL at 08:01

## 2023-01-08 NOTE — ASSESSMENT & PLAN NOTE
- pt complaining of occasional SOB with volume overload  -   - CXR with coarse interstitial lung markings and cardiomegaly   - last echo below  - Holding home lasix in setting of JOSHUA - restart when clinically appropriate   - strict I/Os, daily weights    Results for orders placed during the hospital encounter of 10/07/22  Echo  Interpretation Summary  · The left ventricle is normal in size with concentric remodeling and normal systolic function.  · The estimated ejection fraction is 55-60%.  · Grade III left ventricular diastolic dysfunction.  · Mild mitral regurgitation.  · Normal right ventricular size with mildly reduced right ventricular systolic function.  · Severe tricuspid regurgitation.  · The estimated PA systolic pressure is 52 mmHg. PASP may be under-estimated due to TR severity.  · Elevated central venous pressure (15 mmHg).  · There is pulmonary hypertension.  · Severe left atrial enlargement.

## 2023-01-08 NOTE — PROGRESS NOTES
"Pharmacokinetic Assessment Follow Up: IV Vancomycin    Vancomycin serum concentration assessment(s):    The random level was drawn correctly and can be used to guide therapy at this time. The measurement is above the desired definitive target range of 10 to 15 mcg/mL.    Vancomycin Regimen Plan:    Discontinue the scheduled vancomycin regimen and re-dose when the random level is less than 15 mcg/mL, next level to be drawn at AM-labs on 1/9.    Drug levels (last 3 results):  Recent Labs   Lab Result Units 01/06/23  0505 01/07/23  0526 01/08/23  0233   Vancomycin, Random ug/mL 9.2 18.6 21.8       Pharmacy will continue to follow and monitor vancomycin.    Please contact pharmacy at extension [phone] for questions regarding this assessment.    Thank you for the consult,   Tato Ballesteros       Patient brief summary:  Jenniffer Jimenez is a 89 y.o. female initiated on antimicrobial therapy with IV Vancomycin for treatment of skin & soft tissue infection    The patient's current regimen is 1000mg IV given on 1/7.     Drug Allergies:   Review of patient's allergies indicates:   Allergen Reactions    Opioids - morphine analogues Other (See Comments)     Bowel issues; bowel obstruction    Tizanidine Other (See Comments)     "Lips were numb,  Almost passed out."    Tramadol Hallucinations    Beta-blockers (beta-adrenergic blocking agts) Other (See Comments)     Can not go on beta blockers for long period of time - due to taking allergy injections    Morphine     Opioids-meperidine and related     Ciprofloxacin Rash       Actual Body Weight:   76.8    Renal Function:   Estimated Creatinine Clearance: 25.5 mL/min (A) (based on SCr of 1.5 mg/dL (H)).,     Dialysis Method (if applicable):  N/A    CBC (last 72 hours):  Recent Labs   Lab Result Units 01/05/23  1533 01/06/23  0505 01/07/23  0526 01/08/23  0807   WBC K/uL 5.03 4.47 4.51 4.31   Hemoglobin g/dL 9.6* 9.0* 8.5* 9.2*   Hematocrit % 29.7* 27.6* 26.8* 28.3*   Platelets K/uL 221 " 216 193 237   Gran % % 68.0 63.3 66.7 83.0*   Lymph % % 16.0* 18.3 14.9* 11.0*   Mono % % 13.0 14.8 13.7 4.0   Eosinophil % % 2.0 1.8 1.6 1.0   Basophil % % 1.0 0.2 0.4 0.0   Differential Method  Manual Automated Automated Manual       Metabolic Panel (last 72 hours):  Recent Labs   Lab Result Units 01/05/23  1533 01/05/23  1847 01/06/23  0036 01/06/23  0505 01/06/23  0840 01/06/23  1147 01/06/23  1618 01/06/23  2003 01/07/23  0018 01/07/23  0526 01/07/23  1549 01/07/23  1822 01/07/23  2027 01/07/23  2351 01/08/23  0233 01/08/23  0807 01/08/23  1221   Sodium mmol/L 137 136 137 139  139 139 135* 134* 134* 133* 135*  135* 131*  --  135* 134* 134* 134* 134*   Potassium mmol/L 5.1 5.3*  5.3*  5.3* 5.3*  5.3*  5.3* 5.2*  5.2*  5.2*  5.2* 6.0*  6.0* 5.3*  5.3* 4.9  4.9 5.0  5.0 4.7  4.7 4.9  4.9  4.9 5.1  5.1  --  6.6*  6.6* 4.5  4.5 5.0  5.0 5.3*  5.3* 4.8  4.8   Chloride mmol/L 109 108 108 110  110 110 105 106 105 104 105  105 103  --  107 104 105 104 103   CO2 mmol/L 21* 21* 22* 20*  20* 20* 23 18* 19* 21* 21*  21* 19*  --  16* 20* 18* 22* 22*   Glucose mg/dL 171* 150* 152* 124*  124* 138* 139* 187* 191* 153* 129*  129* 111*  --  138* 173* 142* 109 105   Glucose, UA   --   --   --   --   --   --   --   --   --   --   --  Negative  --   --   --   --   --    BUN mg/dL 39* 40* 42* 40*  40* 39* 37* 39* 39* 41* 44*  44* 45*  --  47* 52* 49* 49* 48*   Creatinine mg/dL 1.4 1.4 1.3 1.3  1.3 1.1 1.2 1.4 1.4 1.3 1.4  1.4 1.6*  --  1.7* 1.6* 1.5* 1.5* 1.5*   Magnesium mg/dL 2.1  --   --  2.2  --   --   --   --   --  2.1  --   --   --   --   --   --   --    Phosphorus mg/dL  --   --   --  3.8  --   --   --   --   --  4.7*  --   --   --   --   --   --   --        Vancomycin Administrations:  vancomycin given in the last 96 hours                     vancomycin (VANCOCIN) 1,000 mg in dextrose 5 % 250 mL IVPB (mg) 1,000 mg New Bag 01/07/23 1154    vancomycin 1,500 mg in dextrose 5 % 250 mL IVPB (mg)  1,500 mg New Bag 01/06/23 1053    vancomycin 1,500 mg in dextrose 5 % 250 mL IVPB (mg) 1,500 mg New Bag 01/05/23 0406                    Microbiologic Results:  Microbiology Results (last 7 days)       Procedure Component Value Units Date/Time    Blood culture [[id number]] Collected: 01/06/23 1619    Order Status: Completed Specimen: Blood Updated: 01/07/23 1812     Blood Culture, Routine No Growth to date      No Growth to date    Narrative:      Site 2    Blood culture [[id number]] Collected: 01/06/23 1619    Order Status: Completed Specimen: Blood Updated: 01/07/23 1812     Blood Culture, Routine No Growth to date      No Growth to date    Narrative:      Site 1    Culture, Anaerobe [[id number]]     Order Status: No result Specimen: Wound from Leg, Right     Aerobic culture [[id number]]     Order Status: No result Specimen: Wound from Leg, Right

## 2023-01-08 NOTE — ASSESSMENT & PLAN NOTE
Stage 3b chronic kidney disease  - Pt hyperkalemic to 6.6 overnight. Pt given calcium gluconate, albuterol, and lokelma with improvement to 5.0 this morning.   - EKG pending  - likely 2/2 JOSHUA  - continue to shift with scheduled lokelma  - monitor on tele

## 2023-01-08 NOTE — ASSESSMENT & PLAN NOTE
Gait instability  - Discussed plan of care with patient and daughter at bedside who report significant weakness and debility with acute difficulty performing ADLs over the past few weeks and throughout admission  - PT/OT consulted

## 2023-01-08 NOTE — ASSESSMENT & PLAN NOTE
- acute on chronic issue  - afebrile without leukocytosis  - ESR, CRP elevated  - Pt reports RLE pain somewhat worse than previous and per nursing, pt poorly tolerating BID dressing changes. Wound undressed at bedside and appears worse than previous with increased redness and drainage.   - Increased pain control to include scheduled tylenol, prn oxy 5 q6h and oxy 15 mg q6 prn (to be used with dressing changes)    - bowel regimen in place to prevent opioid induced constipation  - continue IV vanc (pharmD to dose), continue cipro for gram - and pseudomonas coverage   - pt with rash on day 7 of 10 of recent cipro course outpatient though rash unlikely to be 2/2 cipro given multiple previous courses of cipro without complication or reaction    - benadryl ordered, plan to monitor closely for reaction  - wound care consulted and recommending BID dressing changes by nursing  - add ana to promote wound healing  - LE US negative for DVT  - elevate extremity

## 2023-01-08 NOTE — SUBJECTIVE & OBJECTIVE
Interval History: Pt seen and examined by me this morning with nursing at bedside. Mildly hypotensive otherwise VSSAF. Pt hyperkalemic to 6.6 overnight. Pt given calcium gluconate, albuterol, and lokelma with improvement to 5.0 this morning. EKG pending. Plan to continue scheduled lokelma. Pt reports RLE pain somewhat worse than previous and per nursing, pt poorly tolerating BID dressing changes, pain regimen adjusted this morning. Wound undressed at bedside and appears worse than previous with increased redness and drainage. Plan to continue IV vancomycin and ciprofloxacin. Pt also reports significant itching, most prominent to lower abdomen, pubis, and upper thighs. Scheduled benadryl and miconazole cream added.     Review of Systems   Constitutional:  Negative for appetite change, chills and fever.   HENT:  Negative for congestion, trouble swallowing and voice change.    Eyes:  Negative for photophobia and visual disturbance.   Respiratory:  Negative for cough, chest tightness and wheezing.    Cardiovascular:  Positive for leg swelling. Negative for chest pain and palpitations.   Gastrointestinal:  Negative for abdominal pain, constipation, diarrhea, nausea and vomiting.   Endocrine: Negative for polyphagia and polyuria.   Genitourinary:  Negative for decreased urine volume, difficulty urinating, dysuria, flank pain and hematuria.   Musculoskeletal:  Positive for arthralgias (R hip). Negative for back pain and gait problem.   Skin:  Positive for color change and wound.   Neurological:  Negative for dizziness, seizures, syncope, weakness, numbness and headaches.   Psychiatric/Behavioral:  Negative for agitation, behavioral problems and confusion.    Objective:     Vital Signs (Most Recent):  Temp: 96.6 °F (35.9 °C) (01/08/23 1157)  Pulse: 82 (01/08/23 1157)  Resp: 18 (01/08/23 1157)  BP: (!) 105/57 (01/08/23 1157)  SpO2: 96 % (01/08/23 1157)   Vital Signs (24h Range):  Temp:  [96.6 °F (35.9 °C)-99.2 °F (37.3 °C)]  96.6 °F (35.9 °C)  Pulse:  [] 82  Resp:  [12-18] 18  SpO2:  [90 %-96 %] 96 %  BP: (105-165)/(52-61) 105/57     Weight: 76.8 kg (169 lb 5 oz)  Body mass index is 28.18 kg/m².    Intake/Output Summary (Last 24 hours) at 1/8/2023 1243  Last data filed at 1/8/2023 0259  Gross per 24 hour   Intake --   Output 600 ml   Net -600 ml      Physical Exam  Vitals and nursing note reviewed.   Constitutional:       General: She is not in acute distress.     Appearance: She is well-developed.   HENT:      Head: Normocephalic and atraumatic.      Mouth/Throat:      Mouth: Mucous membranes are dry.      Pharynx: No oropharyngeal exudate.   Eyes:      Extraocular Movements: Extraocular movements intact.      Conjunctiva/sclera: Conjunctivae normal.      Pupils: Pupils are equal, round, and reactive to light.   Cardiovascular:      Rate and Rhythm: Normal rate and regular rhythm.      Heart sounds: Normal heart sounds.   Pulmonary:      Effort: Pulmonary effort is normal. No respiratory distress.      Breath sounds: Normal breath sounds. No wheezing or rales.   Abdominal:      General: Bowel sounds are normal. There is no distension.      Palpations: Abdomen is soft.      Tenderness: There is no abdominal tenderness.      Comments: Erythematous irregular patch to low abdomen and mons pubis with associated excoriations (see below)   Musculoskeletal:         General: Swelling present. No tenderness. Normal range of motion.      Cervical back: Normal range of motion and neck supple.   Lymphadenopathy:      Cervical: No cervical adenopathy.   Skin:     General: Skin is warm and dry.      Capillary Refill: Capillary refill takes less than 2 seconds.      Findings: Erythema present. No rash.      Comments: RLE dressing removed with green/blue malodorous drainage. Underlying skin erythematous, warm, and TTP - appearing worse than previous exam. Good pulses and perfusion.    Neurological:      Mental Status: She is alert and oriented to  person, place, and time.      Cranial Nerves: No cranial nerve deficit.      Sensory: No sensory deficit.      Coordination: Coordination normal.   Psychiatric:         Behavior: Behavior normal.         Thought Content: Thought content normal.         Judgment: Judgment normal.     L-sided abdomen and pubis        RLE 1/4/23:     RLE 1/6/23:     RLE 1/7/23:     RLE 1/8/23:         Significant Labs: All pertinent labs within the past 24 hours have been reviewed.  CBC:   Recent Labs   Lab 01/07/23  0526 01/08/23  0807   WBC 4.51 4.31   HGB 8.5* 9.2*   HCT 26.8* 28.3*    237     CMP:   Recent Labs   Lab 01/07/23  2351 01/08/23  0233 01/08/23  0807   * 134* 134*   K 4.5  4.5 5.0  5.0 5.3*  5.3*    105 104   CO2 20* 18* 22*   * 142* 109   BUN 52* 49* 49*   CREATININE 1.6* 1.5* 1.5*   CALCIUM 8.8 8.9 9.0   ANIONGAP 10 11 8       Significant Imaging: I have reviewed all pertinent imaging results/findings within the past 24 hours.    US Lower Extremity Veins Bilateral  Narrative: EXAMINATION:  US LOWER EXTREMITY VEINS BILATERAL    CLINICAL HISTORY:  Rule out DVT;    TECHNIQUE:  Duplex and color flow Doppler and dynamic compression was performed of the bilateral lower extremity veins was performed.    COMPARISON:  Ultrasound 11/08/2022 09/25/2022 01/02/2020    FINDINGS:  Right thigh veins: The common femoral, femoral, popliteal, upper greater saphenous, and deep femoral veins are patent and free of thrombus. The veins are normally compressible and have normal phasic flow and augmentation response.    Right calf veins: Posterior tibial vein is not visualized due to overlying bandage.  The remaining visualized calf veins are patent.    Left thigh veins: The common femoral, femoral, popliteal, upper greater saphenous, and deep femoral veins are patent and free of thrombus. The veins are normally compressible and have normal phasic flow and augmentation response.    Left calf veins: The visualized  "calf veins are patent.    Miscellaneous: Left Baker's cyst measuring 0.7 x 0.6 x 0.4 cm.  Bilateral lower extremities edema.  Impression: No evidence of deep venous thrombosis in either lower extremity.    Subcentimeter left Baker's cyst.    Electronically signed by resident: Ekaterina Gutierrez  Date:    01/05/2023  Time:    11:58    Electronically signed by: Joesph Chery MD  Date:    01/05/2023  Time:    12:12  X-Ray Chest AP Portable  Narrative: EXAMINATION:  XR CHEST AP PORTABLE    CLINICAL HISTORY:  Provided history is "SOB;  ".    TECHNIQUE:  One view of the chest.    COMPARISON:  09/25/2022.    FINDINGS:  Cardiac wires overlie the chest.  Cardiomediastinal silhouette is mildly enlarged and similar to the prior study.  Atherosclerotic calcifications overlie the aortic arch.  Stable elevation of the left hemidiaphragm.  Coarsened bilateral interstitial lung markings but no confluent area of consolidation.  No large pleural effusion.  No distinct pneumothorax.  Postoperative changes in the left humerus.  Impression: Cardiomegaly and coarsened interstitial lung markings.  No confluent area of consolidation or detrimental change when compared with 09/25/2022.    Electronically signed by: Tye Ferris MD  Date:    01/05/2023  Time:    01:39     "

## 2023-01-08 NOTE — PLAN OF CARE
Plan of care reviewed with Pt. Wound care complete as ordered. Pt repositioned  through out the shift Pain medication given as ordered.   Problem: Adult Inpatient Plan of Care  Goal: Plan of Care Review  Outcome: Ongoing, Progressing  Goal: Patient-Specific Goal (Individualized)  Outcome: Ongoing, Progressing  Goal: Absence of Hospital-Acquired Illness or Injury  Outcome: Ongoing, Progressing  Goal: Optimal Comfort and Wellbeing  Outcome: Ongoing, Progressing  Goal: Readiness for Transition of Care  Outcome: Ongoing, Progressing

## 2023-01-08 NOTE — NURSING
1240   Notified by pt's daughter that pt was in pain. Pt assessed. Pt AAOx3. Pt states she is not in pain. PA in room discussing plan of care with pt and daughter.     1430   Bed alarm sounding off. Nurse enters room.  Pt oriented to self. Pt in room walking unsteady gait noted. Pt assisted back to bed. Safety maintained. Bed alarm re activated Pt educated on call light usage and to call for assistance out of bed. Call Light in reach.     1500  Bed alarm sounding off pt OOB without assistance unsteady gait noted. Tele sitter ordered.     1600  Bed alarm sounding. Nurse and charge nurse at bed side. Pt has multiple complaints Pt assessed. No s/s of  discomfort. Safety maintained.     1630   Pt removed IV. PA notified.

## 2023-01-08 NOTE — PROGRESS NOTES
[facility] [address] [place] [address] Medicine  Progress Note    Patient Name: Jenniffer Jimenez  MRN: [id number]  Patient Class: OP- Observation   Admission Date: 1/4/2023  Length of Stay: 0 days  Attending Physician: Nely Chahal MD  Primary Care Provider: Rubi Young DO        Subjective:     Principal Problem:Cellulitis of right lower extremity        HPI:  Jenniffer Jimenez is a 89 y.o. female with a PMHx of HLD, HTN, AF s/p watchman, chronic cellulitis/ wound of her right lower extremity who presents to [facility] for evaluation of worsening redness and pain to her right lwoer extremity. Patient is a poor historian and has difficulty hearing. Per ED note, patient receives wound care for chronic RLE cellulitis. Wound care nuse noted worsening redness, weeping, and pain to her RLE. Apparently patient was supposed to be on ciprofloxacin but had a bad reaction so she has been off of it for the past 2 weeks.  The patient and has not had an alternative antibiotic prescribed but reportedly is supposed to be on an antibiotic. Patient also complains of intermittent shortness of breath recently which mostly occurs on exertion. Denies any fever, nausea, vomiting, chest pain, numbness/tingling, weakness, slurred speech, or recent falls.     ED: AFVSS. No leukocytosis. K 5.9, Cr 1.5 (BL ~1.2). EKG, CXR pending. Given IV vanc.      Overview/Hospital Course:  Pt placed in observation for management of worsening RLE cellulitis. Pt receiving IV vancomycin and ciprofloxacin and clinically improving. Plan to discharge home with home health when clinically stable.       Interval History: Pt seen and examined by me this morning with nursing at bedside. Mildly hypotensive otherwise VSSAF. Pt hyperkalemic to 6.6 overnight. Pt given calcium gluconate, albuterol, and lokelma with improvement to 5.0 this morning. EKG pending. Plan to continue scheduled lokelma. Pt reports RLE pain somewhat worse than previous and per nursing, pt  poorly tolerating BID dressing changes, pain regimen adjusted this morning. Wound undressed at bedside and appears worse than previous with increased redness and drainage. Plan to continue IV vancomycin and ciprofloxacin. Pt also reports significant itching, most prominent to lower abdomen, pubis, and upper thighs. Scheduled benadryl and miconazole cream added.     Review of Systems   Constitutional:  Negative for appetite change, chills and fever.   HENT:  Negative for congestion, trouble swallowing and voice change.    Eyes:  Negative for photophobia and visual disturbance.   Respiratory:  Negative for cough, chest tightness and wheezing.    Cardiovascular:  Positive for leg swelling. Negative for chest pain and palpitations.   Gastrointestinal:  Negative for abdominal pain, constipation, diarrhea, nausea and vomiting.   Endocrine: Negative for polyphagia and polyuria.   Genitourinary:  Negative for decreased urine volume, difficulty urinating, dysuria, flank pain and hematuria.   Musculoskeletal:  Positive for arthralgias (R hip). Negative for back pain and gait problem.   Skin:  Positive for color change and wound.   Neurological:  Negative for dizziness, seizures, syncope, weakness, numbness and headaches.   Psychiatric/Behavioral:  Negative for agitation, behavioral problems and confusion.    Objective:     Vital Signs (Most Recent):  Temp: 96.6 °F (35.9 °C) (01/08/23 1157)  Pulse: 82 (01/08/23 1157)  Resp: 18 (01/08/23 1157)  BP: (!) 105/57 (01/08/23 1157)  SpO2: 96 % (01/08/23 1157)   Vital Signs (24h Range):  Temp:  [96.6 °F (35.9 °C)-99.2 °F (37.3 °C)] 96.6 °F (35.9 °C)  Pulse:  [] 82  Resp:  [12-18] 18  SpO2:  [90 %-96 %] 96 %  BP: (105-165)/(52-61) 105/57     Weight: 76.8 kg (169 lb 5 oz)  Body mass index is 28.18 kg/m².    Intake/Output Summary (Last 24 hours) at 1/8/2023 1246  Last data filed at 1/8/2023 0259  Gross per 24 hour   Intake --   Output 600 ml   Net -600 ml      Physical Exam  Vitals  and nursing note reviewed.   Constitutional:       General: She is not in acute distress.     Appearance: She is well-developed.   HENT:      Head: Normocephalic and atraumatic.      Mouth/Throat:      Mouth: Mucous membranes are dry.      Pharynx: No oropharyngeal exudate.   Eyes:      Extraocular Movements: Extraocular movements intact.      Conjunctiva/sclera: Conjunctivae normal.      Pupils: Pupils are equal, round, and reactive to light.   Cardiovascular:      Rate and Rhythm: Normal rate and regular rhythm.      Heart sounds: Normal heart sounds.   Pulmonary:      Effort: Pulmonary effort is normal. No respiratory distress.      Breath sounds: Normal breath sounds. No wheezing or rales.   Abdominal:      General: Bowel sounds are normal. There is no distension.      Palpations: Abdomen is soft.      Tenderness: There is no abdominal tenderness.      Comments: Erythematous irregular patch to low abdomen and mons pubis with associated excoriations (see below)   Musculoskeletal:         General: Swelling present. No tenderness. Normal range of motion.      Cervical back: Normal range of motion and neck supple.   Lymphadenopathy:      Cervical: No cervical adenopathy.   Skin:     General: Skin is warm and dry.      Capillary Refill: Capillary refill takes less than 2 seconds.      Findings: Erythema present. No rash.      Comments: RLE dressing removed with green/blue malodorous drainage. Underlying skin erythematous, warm, and TTP - appearing worse than previous exam. Good pulses and perfusion.    Neurological:      Mental Status: She is alert and oriented to person, place, and time.      Cranial Nerves: No cranial nerve deficit.      Sensory: No sensory deficit.      Coordination: Coordination normal.   Psychiatric:         Behavior: Behavior normal.         Thought Content: Thought content normal.         Judgment: Judgment normal.     L-sided abdomen and pubis        RLE 1/4/23:     RLE 1/6/23:     RLE  1/7/23:     RLE 1/8/23:         Significant Labs: All pertinent labs within the past 24 hours have been reviewed.  CBC:   Recent Labs   Lab 01/07/23  0526 01/08/23  0807   WBC 4.51 4.31   HGB 8.5* 9.2*   HCT 26.8* 28.3*    237     CMP:   Recent Labs   Lab 01/07/23  2351 01/08/23  0233 01/08/23  0807   * 134* 134*   K 4.5  4.5 5.0  5.0 5.3*  5.3*    105 104   CO2 20* 18* 22*   * 142* 109   BUN 52* 49* 49*   CREATININE 1.6* 1.5* 1.5*   CALCIUM 8.8 8.9 9.0   ANIONGAP 10 11 8       Significant Imaging: I have reviewed all pertinent imaging results/findings within the past 24 hours.    US Lower Extremity Veins Bilateral  Narrative: EXAMINATION:  US LOWER EXTREMITY VEINS BILATERAL    CLINICAL HISTORY:  Rule out DVT;    TECHNIQUE:  Duplex and color flow Doppler and dynamic compression was performed of the bilateral lower extremity veins was performed.    COMPARISON:  Ultrasound 11/08/2022 09/25/2022 01/02/2020    FINDINGS:  Right thigh veins: The common femoral, femoral, popliteal, upper greater saphenous, and deep femoral veins are patent and free of thrombus. The veins are normally compressible and have normal phasic flow and augmentation response.    Right calf veins: Posterior tibial vein is not visualized due to overlying bandage.  The remaining visualized calf veins are patent.    Left thigh veins: The common femoral, femoral, popliteal, upper greater saphenous, and deep femoral veins are patent and free of thrombus. The veins are normally compressible and have normal phasic flow and augmentation response.    Left calf veins: The visualized calf veins are patent.    Miscellaneous: Left Baker's cyst measuring 0.7 x 0.6 x 0.4 cm.  Bilateral lower extremities edema.  Impression: No evidence of deep venous thrombosis in either lower extremity.    Subcentimeter left Baker's cyst.    Electronically signed by resident: Ekaterina Gutierrez  Date:    01/05/2023  Time:    11:58    Electronically  "signed by: Joesph Chery MD  Date:    01/05/2023  Time:    12:12  X-Ray Chest AP Portable  Narrative: EXAMINATION:  XR CHEST AP PORTABLE    CLINICAL HISTORY:  Provided history is "SOB;  ".    TECHNIQUE:  One view of the chest.    COMPARISON:  09/25/2022.    FINDINGS:  Cardiac wires overlie the chest.  Cardiomediastinal silhouette is mildly enlarged and similar to the prior study.  Atherosclerotic calcifications overlie the aortic arch.  Stable elevation of the left hemidiaphragm.  Coarsened bilateral interstitial lung markings but no confluent area of consolidation.  No large pleural effusion.  No distinct pneumothorax.  Postoperative changes in the left humerus.  Impression: Cardiomegaly and coarsened interstitial lung markings.  No confluent area of consolidation or detrimental change when compared with 09/25/2022.    Electronically signed by: Tye Ferris MD  Date:    01/05/2023  Time:    01:39         Assessment/Plan:      * Cellulitis of right lower extremity  - acute on chronic issue  - afebrile without leukocytosis  - ESR, CRP elevated  - Pt reports RLE pain somewhat worse than previous and per nursing, pt poorly tolerating BID dressing changes. Wound undressed at bedside and appears worse than previous with increased redness and drainage.   - Increased pain control to include scheduled tylenol, prn oxy 5 q6h and oxy 15 mg q6 prn (to be used with dressing changes)    - bowel regimen in place to prevent opioid induced constipation  - continue IV vanc (pharmD to dose), continue cipro for gram - and pseudomonas coverage   - pt with rash on day 7 of 10 of recent cipro course outpatient though rash unlikely to be 2/2 cipro given multiple previous courses of cipro without complication or reaction    - benadryl ordered, plan to monitor closely for reaction  - wound care consulted and recommending BID dressing changes by nursing  - add ana to promote wound healing  - LE US negative for DVT  - elevate " extremity    Intertrigo  - Continue miconazole cream    Trochanteric bursitis of right hip  - Scheduled tylenol  - Lidocaine jelly  - Voltaren gel     Hyperkalemia  Stage 3b chronic kidney disease  - Pt hyperkalemic to 6.6 overnight. Pt given calcium gluconate, albuterol, and lokelma with improvement to 5.0 this morning.   - EKG pending  - likely 2/2 JOSHUA  - continue to shift with scheduled lokelma  - monitor on tele    JOSHUA (acute kidney injury)  - Creatinine 1.5 on admission, Cr today Estimated Creatinine Clearance: 25.5 mL/min (A) (based on SCr of 1.5 mg/dL (H)). (baseline 1.2)  - Suspect pt has prerenal azotemia 2/2 decreased PO intake  - UA with multiple hyaline casts   - Monitor BMP daily, replete electrolytes if necessary  - Renally adjust drugs to CrCl; avoid nephrotoxic drugs  - s/p IVFs   - Hold lasix, encourage PO intake  - Give NS at 50cc/hr x 10 hrs today  - Strict I/Os  - Consider renal U/S if no improvement in 24-48 hrs    Chronic diastolic heart failure  - pt complaining of occasional SOB with volume overload  -   - CXR with coarse interstitial lung markings and cardiomegaly   - last echo below  - Holding home lasix in setting of JOSHUA - restart when clinically appropriate   - strict I/Os, daily weights    Results for orders placed during the hospital encounter of 10/07/22  Echo  Interpretation Summary  · The left ventricle is normal in size with concentric remodeling and normal systolic function.  · The estimated ejection fraction is 55-60%.  · Grade III left ventricular diastolic dysfunction.  · Mild mitral regurgitation.  · Normal right ventricular size with mildly reduced right ventricular systolic function.  · Severe tricuspid regurgitation.  · The estimated PA systolic pressure is 52 mmHg. PASP may be under-estimated due to TR severity.  · Elevated central venous pressure (15 mmHg).  · There is pulmonary hypertension.  · Severe left atrial enlargement.    Chronic atrial fibrillation  - no  longer on eliquis s/p Watchman implantation  - in rate controlled afib on tele     Debility  Gait instability  - Discussed plan of care with patient and daughter at bedside who report significant weakness and debility with acute difficulty performing ADLs over the past few weeks and throughout admission  - PT/OT consulted    Primary hypertension  - hold aldactone given JOSHUA    Pure hypercholesterolemia  - continue ASA, statin     VTE Risk Mitigation (From admission, onward)         Ordered     enoxaparin injection 30 mg  Daily         01/05/23 0040     IP VTE HIGH RISK PATIENT  Once         01/05/23 0040                Discharge Planning   GILES: 1/9/2023     Code Status: Full Code   Is the patient medically ready for discharge?: No    Reason for patient still in hospital (select all that apply): Patient trending condition, Treatment and PT / OT recommendations  Discharge Plan A: Home Health   Discharge Delays:  (Medical clearance)              JUSTINO ShinC  Department of Hospital Medicine   [facility]

## 2023-01-09 PROBLEM — R41.0 DELIRIUM: Status: ACTIVE | Noted: 2023-01-09

## 2023-01-09 LAB
ANION GAP SERPL CALC-SCNC: 11 MMOL/L (ref 8–16)
ANION GAP SERPL CALC-SCNC: 11 MMOL/L (ref 8–16)
ANION GAP SERPL CALC-SCNC: 12 MMOL/L (ref 8–16)
ANION GAP SERPL CALC-SCNC: 14 MMOL/L (ref 8–16)
ANION GAP SERPL CALC-SCNC: 8 MMOL/L (ref 8–16)
BUN SERPL-MCNC: 41 MG/DL (ref 8–23)
BUN SERPL-MCNC: 42 MG/DL (ref 8–23)
BUN SERPL-MCNC: 44 MG/DL (ref 8–23)
BUN SERPL-MCNC: 45 MG/DL (ref 8–23)
BUN SERPL-MCNC: 54 MG/DL (ref 8–23)
CALCIUM SERPL-MCNC: 8.7 MG/DL (ref 8.7–10.5)
CALCIUM SERPL-MCNC: 8.7 MG/DL (ref 8.7–10.5)
CALCIUM SERPL-MCNC: 8.8 MG/DL (ref 8.7–10.5)
CALCIUM SERPL-MCNC: 8.9 MG/DL (ref 8.7–10.5)
CALCIUM SERPL-MCNC: 8.9 MG/DL (ref 8.7–10.5)
CHLORIDE SERPL-SCNC: 103 MMOL/L (ref 95–110)
CHLORIDE SERPL-SCNC: 105 MMOL/L (ref 95–110)
CHLORIDE SERPL-SCNC: 106 MMOL/L (ref 95–110)
CO2 SERPL-SCNC: 17 MMOL/L (ref 23–29)
CO2 SERPL-SCNC: 19 MMOL/L (ref 23–29)
CO2 SERPL-SCNC: 20 MMOL/L (ref 23–29)
CO2 SERPL-SCNC: 20 MMOL/L (ref 23–29)
CO2 SERPL-SCNC: 21 MMOL/L (ref 23–29)
CREAT SERPL-MCNC: 1.2 MG/DL (ref 0.5–1.4)
CREAT SERPL-MCNC: 1.3 MG/DL (ref 0.5–1.4)
CREAT SERPL-MCNC: 1.3 MG/DL (ref 0.5–1.4)
CREAT SERPL-MCNC: 1.4 MG/DL (ref 0.5–1.4)
CREAT SERPL-MCNC: 1.4 MG/DL (ref 0.5–1.4)
EST. GFR  (NO RACE VARIABLE): 35.7 ML/MIN/1.73 M^2
EST. GFR  (NO RACE VARIABLE): 35.7 ML/MIN/1.73 M^2
EST. GFR  (NO RACE VARIABLE): 39.1 ML/MIN/1.73 M^2
EST. GFR  (NO RACE VARIABLE): 39.1 ML/MIN/1.73 M^2
EST. GFR  (NO RACE VARIABLE): 43 ML/MIN/1.73 M^2
GLUCOSE SERPL-MCNC: 102 MG/DL (ref 70–110)
GLUCOSE SERPL-MCNC: 105 MG/DL (ref 70–110)
GLUCOSE SERPL-MCNC: 113 MG/DL (ref 70–110)
GLUCOSE SERPL-MCNC: 114 MG/DL (ref 70–110)
GLUCOSE SERPL-MCNC: 132 MG/DL (ref 70–110)
POTASSIUM SERPL-SCNC: 3.9 MMOL/L (ref 3.5–5.1)
POTASSIUM SERPL-SCNC: 4 MMOL/L (ref 3.5–5.1)
POTASSIUM SERPL-SCNC: 4 MMOL/L (ref 3.5–5.1)
POTASSIUM SERPL-SCNC: 4.3 MMOL/L (ref 3.5–5.1)
POTASSIUM SERPL-SCNC: 4.3 MMOL/L (ref 3.5–5.1)
POTASSIUM SERPL-SCNC: 4.4 MMOL/L (ref 3.5–5.1)
POTASSIUM SERPL-SCNC: 4.4 MMOL/L (ref 3.5–5.1)
SODIUM SERPL-SCNC: 134 MMOL/L (ref 136–145)
SODIUM SERPL-SCNC: 134 MMOL/L (ref 136–145)
SODIUM SERPL-SCNC: 136 MMOL/L (ref 136–145)
SODIUM SERPL-SCNC: 136 MMOL/L (ref 136–145)
SODIUM SERPL-SCNC: 137 MMOL/L (ref 136–145)
VANCOMYCIN SERPL-MCNC: 14.8 UG/ML

## 2023-01-09 PROCEDURE — 97535 SELF CARE MNGMENT TRAINING: CPT | Mod: HCNC

## 2023-01-09 PROCEDURE — 25000003 PHARM REV CODE 250: Mod: HCNC

## 2023-01-09 PROCEDURE — 36415 COLL VENOUS BLD VENIPUNCTURE: CPT | Mod: HCNC | Performed by: HOSPITALIST

## 2023-01-09 PROCEDURE — 97165 OT EVAL LOW COMPLEX 30 MIN: CPT | Mod: HCNC

## 2023-01-09 PROCEDURE — 99233 PR SUBSEQUENT HOSPITAL CARE,LEVL III: ICD-10-PCS | Mod: HCNC,,,

## 2023-01-09 PROCEDURE — 25000003 PHARM REV CODE 250: Mod: HCNC | Performed by: HOSPITALIST

## 2023-01-09 PROCEDURE — 25000003 PHARM REV CODE 250: Mod: HCNC | Performed by: PHYSICIAN ASSISTANT

## 2023-01-09 PROCEDURE — 80202 ASSAY OF VANCOMYCIN: CPT | Mod: HCNC | Performed by: HOSPITALIST

## 2023-01-09 PROCEDURE — 97161 PT EVAL LOW COMPLEX 20 MIN: CPT | Mod: HCNC

## 2023-01-09 PROCEDURE — 36415 COLL VENOUS BLD VENIPUNCTURE: CPT | Mod: HCNC

## 2023-01-09 PROCEDURE — 97116 GAIT TRAINING THERAPY: CPT | Mod: HCNC

## 2023-01-09 PROCEDURE — 63600175 PHARM REV CODE 636 W HCPCS: Mod: HCNC | Performed by: PHYSICIAN ASSISTANT

## 2023-01-09 PROCEDURE — 11000001 HC ACUTE MED/SURG PRIVATE ROOM: Mod: HCNC

## 2023-01-09 PROCEDURE — 80048 BASIC METABOLIC PNL TOTAL CA: CPT | Mod: 91,HCNC

## 2023-01-09 PROCEDURE — 99233 SBSQ HOSP IP/OBS HIGH 50: CPT | Mod: HCNC,,,

## 2023-01-09 RX ORDER — OLANZAPINE 10 MG/2ML
2.5 INJECTION, POWDER, FOR SOLUTION INTRAMUSCULAR ONCE AS NEEDED
Status: COMPLETED | OUTPATIENT
Start: 2023-01-10 | End: 2023-01-09

## 2023-01-09 RX ORDER — DOXYCYCLINE HYCLATE 100 MG
100 TABLET ORAL 2 TIMES DAILY
Status: DISCONTINUED | OUTPATIENT
Start: 2023-01-09 | End: 2023-01-13

## 2023-01-09 RX ADMIN — MICONAZOLE NITRATE: 20 CREAM TOPICAL at 10:01

## 2023-01-09 RX ADMIN — ACETAMINOPHEN 1000 MG: 500 TABLET ORAL at 12:01

## 2023-01-09 RX ADMIN — MICONAZOLE NITRATE: 20 CREAM TOPICAL at 09:01

## 2023-01-09 RX ADMIN — SILVER SULFADIAZINE: 10 CREAM TOPICAL at 10:01

## 2023-01-09 RX ADMIN — ASPIRIN 81 MG: 81 TABLET, COATED ORAL at 09:01

## 2023-01-09 RX ADMIN — DICLOFENAC SODIUM 2 G: 10 GEL TOPICAL at 10:01

## 2023-01-09 RX ADMIN — FAMOTIDINE 20 MG: 20 TABLET ORAL at 09:01

## 2023-01-09 RX ADMIN — SODIUM ZIRCONIUM CYCLOSILICATE 10 G: 10 POWDER, FOR SUSPENSION ORAL at 10:01

## 2023-01-09 RX ADMIN — SODIUM ZIRCONIUM CYCLOSILICATE 10 G: 10 POWDER, FOR SUSPENSION ORAL at 02:01

## 2023-01-09 RX ADMIN — SILVER SULFADIAZINE: 10 CREAM TOPICAL at 09:01

## 2023-01-09 RX ADMIN — ENOXAPARIN SODIUM 30 MG: 30 INJECTION SUBCUTANEOUS at 05:01

## 2023-01-09 RX ADMIN — DIPHENHYDRAMINE HYDROCHLORIDE 25 MG: 25 CAPSULE ORAL at 02:01

## 2023-01-09 RX ADMIN — ACETAMINOPHEN 1000 MG: 500 TABLET ORAL at 02:01

## 2023-01-09 RX ADMIN — PRAVASTATIN SODIUM 40 MG: 40 TABLET ORAL at 09:01

## 2023-01-09 RX ADMIN — SODIUM ZIRCONIUM CYCLOSILICATE 10 G: 10 POWDER, FOR SUSPENSION ORAL at 09:01

## 2023-01-09 RX ADMIN — DOXYCYCLINE HYCLATE 100 MG: 100 TABLET, COATED ORAL at 12:01

## 2023-01-09 RX ADMIN — DICLOFENAC SODIUM 2 G: 10 GEL TOPICAL at 09:01

## 2023-01-09 RX ADMIN — CIPROFLOXACIN HYDROCHLORIDE 500 MG: 500 TABLET, FILM COATED ORAL at 09:01

## 2023-01-09 RX ADMIN — OLANZAPINE 2.5 MG: 10 INJECTION, POWDER, FOR SOLUTION INTRAMUSCULAR at 11:01

## 2023-01-09 NOTE — PLAN OF CARE
01/09/23 1514   Post-Acute Status   Post-Acute Authorization Home Health   Home Health Status Set-up Complete/Auth obtained   Discharge Delays (!) Change in Medical Condition  (Patient had aggitation and confusion last night.)   Discharge Plan   Discharge Plan A Home Health

## 2023-01-09 NOTE — SUBJECTIVE & OBJECTIVE
Interval History:   Overnight patient removing tele leads, tele discontinued, patient given IM zyprexa. Hypotensive, all other VSSAF on my exam. Patient without complaints. IV vanco discontinued, transitioned to oral doxy bid and continuing oral cipro for cellulitis coverage. Discontinued benadryl and hydroxyzine given delirium. CBC unable to be collected as patient refusing, CMP without abnormalities requiring repletion, Cr improving to 1.3. Nursing to continue dressing changes on RLE. Patient denies further complaints.     Review of Systems   Constitutional:  Negative for appetite change, chills and fever.   HENT:  Negative for congestion, trouble swallowing and voice change.    Eyes:  Negative for photophobia and visual disturbance.   Respiratory:  Negative for cough, chest tightness and wheezing.    Cardiovascular:  Positive for leg swelling. Negative for chest pain and palpitations.   Gastrointestinal:  Negative for abdominal pain, constipation, diarrhea, nausea and vomiting.   Endocrine: Negative for polyphagia and polyuria.   Genitourinary:  Negative for decreased urine volume, difficulty urinating, dysuria, flank pain and hematuria.   Musculoskeletal:  Positive for arthralgias (R hip). Negative for back pain and gait problem.   Skin:  Positive for color change and wound.   Neurological:  Negative for dizziness, seizures, syncope, weakness, numbness and headaches.   Psychiatric/Behavioral:  Negative for agitation, behavioral problems and confusion.    Objective:     Vital Signs (Most Recent):  Temp: 97.7 °F (36.5 °C) (01/09/23 1604)  Pulse: 71 (01/09/23 1604)  Resp: 18 (01/09/23 1604)  BP: (!) 101/50 (01/09/23 1604)  SpO2: 97 % (01/09/23 1604)   Vital Signs (24h Range):  Temp:  [97.5 °F (36.4 °C)-98.1 °F (36.7 °C)] 97.7 °F (36.5 °C)  Pulse:  [70-93] 71  Resp:  [17-18] 18  SpO2:  [93 %-99 %] 97 %  BP: (101-139)/(49-62) 101/50     Weight: 76.8 kg (169 lb 5 oz)  Body mass index is 28.18 kg/m².    Intake/Output  Summary (Last 24 hours) at 1/9/2023 1614  Last data filed at 1/9/2023 0620  Gross per 24 hour   Intake --   Output 1050 ml   Net -1050 ml      Physical Exam  Vitals and nursing note reviewed.   Constitutional:       General: She is not in acute distress.     Appearance: She is well-developed.   HENT:      Head: Normocephalic and atraumatic.      Mouth/Throat:      Mouth: Mucous membranes are dry.      Pharynx: No oropharyngeal exudate.   Eyes:      Extraocular Movements: Extraocular movements intact.      Conjunctiva/sclera: Conjunctivae normal.      Pupils: Pupils are equal, round, and reactive to light.   Cardiovascular:      Rate and Rhythm: Normal rate and regular rhythm.      Heart sounds: Normal heart sounds.   Pulmonary:      Effort: Pulmonary effort is normal. No respiratory distress.      Breath sounds: Normal breath sounds. No wheezing or rales.   Abdominal:      General: Bowel sounds are normal. There is no distension.      Palpations: Abdomen is soft.      Tenderness: There is no abdominal tenderness.      Comments: Erythematous irregular patch to low abdomen and mons pubis with associated excoriations.   Musculoskeletal:         General: Swelling present. No tenderness. Normal range of motion.      Cervical back: Normal range of motion and neck supple.   Lymphadenopathy:      Cervical: No cervical adenopathy.   Skin:     General: Skin is warm and dry.      Capillary Refill: Capillary refill takes less than 2 seconds.      Findings: Erythema present. No rash.      Comments: RLE dressing clean, dry, and intact.    Neurological:      Mental Status: She is alert and oriented to person, place, and time.      Cranial Nerves: No cranial nerve deficit.      Sensory: No sensory deficit.      Coordination: Coordination normal.   Psychiatric:         Behavior: Behavior normal.         Thought Content: Thought content normal.         Judgment: Judgment normal.       Significant Labs: All pertinent labs within the  past 24 hours have been reviewed.  CBC:   Recent Labs   Lab 01/08/23  0807   WBC 4.31   HGB 9.2*   HCT 28.3*        CMP:   Recent Labs   Lab 01/08/23  2351 01/09/23  0918 01/09/23  1319 01/09/23  1536   * 134* 136 136   K 4.4  4.4 3.9  3.9 3.9  3.9 4.0  4.0    105 106 105   CO2 17* 21* 19* 20*   * 102 113* 105   BUN 45* 41* 44*  --    CREATININE 1.4 1.2 1.3  --    CALCIUM 8.7 8.9 8.7 8.8   ANIONGAP 12 8 11 11     Significant Imaging: I have reviewed all pertinent imaging results/findings within the past 24 hours.

## 2023-01-09 NOTE — ASSESSMENT & PLAN NOTE
Stage 3b chronic kidney disease  - overnight 01/05 K 6.6 - given calcium gluconate, albuterol, and lokelma  - 01/08 K 5  - likely 2/2 JOSHUA  - continue to shift with scheduled lokelma  - monitor on tele

## 2023-01-09 NOTE — ASSESSMENT & PLAN NOTE
- acute on chronic issue  - afebrile without leukocytosis  - ESR, CRP elevated  - Increased pain control to include scheduled tylenol, prn oxy 5 q6h and oxy 15 mg q6 prn (to be used with dressing changes)    - bowel regimen in place to prevent opioid induced constipation  - discontinue IV vanc  - continue cipro and start doxy for gram neg and pseudomonas coverage  - wound care consulted, continue BID dressing changes by nursing   -- ana added to promote wound healing  - LE US negative for DVT  - elevate extremity

## 2023-01-09 NOTE — PLAN OF CARE
PT evaluation complete - see note for details. POC and goals established.    Problem: Physical Therapy  Goal: Physical Therapy Goal  Description: Goals to be met by: 23     Patient will increase functional independence with mobility by performin. Sit to stand transfer with Stand-by Assistance  2. Gait  x 100 feet with Stand-by Assistance using LRAD.   3. Stand for 6 minutes with Stand-by Assistance using LRAD while performing dynamic tasks.    Outcome: Ongoing, Progressing     2023

## 2023-01-09 NOTE — PROGRESS NOTES
[facility] [address] [place] [address] Medicine  Progress Note    Patient Name: Jenniffer Jimenez  MRN: [id number]  Patient Class: IP- Inpatient   Admission Date: 1/4/2023  Length of Stay: 0 days  Attending Physician: Nely Chahal MD  Primary Care Provider: Rubi Young DO        Subjective:     Principal Problem:Cellulitis of right lower extremity        HPI:  Jenniffer Jimenez is a 89 y.o. female with a PMHx of HLD, HTN, AF s/p watchman, chronic cellulitis/ wound of her right lower extremity who presents to [facility] [place] for evaluation of worsening redness and pain to her right lwoer extremity. Patient is a poor historian and has difficulty hearing. Per ED note, patient receives wound care for chronic RLE cellulitis. Wound care nuse noted worsening redness, weeping, and pain to her RLE. Apparently patient was supposed to be on ciprofloxacin but had a bad reaction so she has been off of it for the past 2 weeks.  The patient and has not had an alternative antibiotic prescribed but reportedly is supposed to be on an antibiotic. Patient also complains of intermittent shortness of breath recently which mostly occurs on exertion. Denies any fever, nausea, vomiting, chest pain, numbness/tingling, weakness, slurred speech, or recent falls.     ED: AFVSS. No leukocytosis. K 5.9, Cr 1.5 (BL ~1.2). EKG, CXR pending. Given IV vanc.      Overview/Hospital Course:  Pt placed in observation for management of worsening RLE cellulitis. Pt receiving IV vancomycin and ciprofloxacin and clinically improving. Plan to discharge home with home health when clinically stable.       Interval History:   Overnight patient removing tele leads, tele discontinued, patient given IM zyprexa. Hypotensive, all other VSSAF on my exam. Patient without complaints. IV vanco discontinued, transitioned to oral doxy bid and continuing oral cipro for cellulitis coverage. Discontinued benadryl and hydroxyzine given delirium. CBC unable to be collected as  patient refusing, CMP without abnormalities requiring repletion, Cr improving to 1.3. Nursing to continue dressing changes on RLE. Patient denies further complaints.     Review of Systems   Constitutional:  Negative for appetite change, chills and fever.   HENT:  Negative for congestion, trouble swallowing and voice change.    Eyes:  Negative for photophobia and visual disturbance.   Respiratory:  Negative for cough, chest tightness and wheezing.    Cardiovascular:  Positive for leg swelling. Negative for chest pain and palpitations.   Gastrointestinal:  Negative for abdominal pain, constipation, diarrhea, nausea and vomiting.   Endocrine: Negative for polyphagia and polyuria.   Genitourinary:  Negative for decreased urine volume, difficulty urinating, dysuria, flank pain and hematuria.   Musculoskeletal:  Positive for arthralgias (R hip). Negative for back pain and gait problem.   Skin:  Positive for color change and wound.   Neurological:  Negative for dizziness, seizures, syncope, weakness, numbness and headaches.   Psychiatric/Behavioral:  Negative for agitation, behavioral problems and confusion.    Objective:     Vital Signs (Most Recent):  Temp: 97.7 °F (36.5 °C) (01/09/23 1604)  Pulse: 71 (01/09/23 1604)  Resp: 18 (01/09/23 1604)  BP: (!) 101/50 (01/09/23 1604)  SpO2: 97 % (01/09/23 1604)   Vital Signs (24h Range):  Temp:  [97.5 °F (36.4 °C)-98.1 °F (36.7 °C)] 97.7 °F (36.5 °C)  Pulse:  [70-93] 71  Resp:  [17-18] 18  SpO2:  [93 %-99 %] 97 %  BP: (101-139)/(49-62) 101/50     Weight: 76.8 kg (169 lb 5 oz)  Body mass index is 28.18 kg/m².    Intake/Output Summary (Last 24 hours) at 1/9/2023 1614  Last data filed at 1/9/2023 0620  Gross per 24 hour   Intake --   Output 1050 ml   Net -1050 ml      Physical Exam  Vitals and nursing note reviewed.   Constitutional:       General: She is not in acute distress.     Appearance: She is well-developed.   HENT:      Head: Normocephalic and atraumatic.      Mouth/Throat:       Mouth: Mucous membranes are dry.      Pharynx: No oropharyngeal exudate.   Eyes:      Extraocular Movements: Extraocular movements intact.      Conjunctiva/sclera: Conjunctivae normal.      Pupils: Pupils are equal, round, and reactive to light.   Cardiovascular:      Rate and Rhythm: Normal rate and regular rhythm.      Heart sounds: Normal heart sounds.   Pulmonary:      Effort: Pulmonary effort is normal. No respiratory distress.      Breath sounds: Normal breath sounds. No wheezing or rales.   Abdominal:      General: Bowel sounds are normal. There is no distension.      Palpations: Abdomen is soft.      Tenderness: There is no abdominal tenderness.      Comments: Erythematous irregular patch to low abdomen and mons pubis with associated excoriations.   Musculoskeletal:         General: Swelling present. No tenderness. Normal range of motion.      Cervical back: Normal range of motion and neck supple.   Lymphadenopathy:      Cervical: No cervical adenopathy.   Skin:     General: Skin is warm and dry.      Capillary Refill: Capillary refill takes less than 2 seconds.      Findings: Erythema present. No rash.      Comments: RLE dressing clean, dry, and intact.    Neurological:      Mental Status: She is alert and oriented to person, place, and time.      Cranial Nerves: No cranial nerve deficit.      Sensory: No sensory deficit.      Coordination: Coordination normal.   Psychiatric:         Behavior: Behavior normal.         Thought Content: Thought content normal.         Judgment: Judgment normal.       Significant Labs: All pertinent labs within the past 24 hours have been reviewed.  CBC:   Recent Labs   Lab 01/08/23  0807   WBC 4.31   HGB 9.2*   HCT 28.3*        CMP:   Recent Labs   Lab 01/08/23  2351 01/09/23  0918 01/09/23  1319 01/09/23  1536   * 134* 136 136   K 4.4  4.4 3.9  3.9 3.9  3.9 4.0  4.0    105 106 105   CO2 17* 21* 19* 20*   * 102 113* 105   BUN 45* 41* 44*  --     CREATININE 1.4 1.2 1.3  --    CALCIUM 8.7 8.9 8.7 8.8   ANIONGAP 12 8 11 11     Significant Imaging: I have reviewed all pertinent imaging results/findings within the past 24 hours.      Assessment/Plan:      * Cellulitis of right lower extremity  - acute on chronic issue  - afebrile without leukocytosis  - ESR, CRP elevated  - Increased pain control to include scheduled tylenol, prn oxy 5 q6h and oxy 15 mg q6 prn (to be used with dressing changes)    - bowel regimen in place to prevent opioid induced constipation  - discontinue IV vanc  - continue cipro and start doxy for gram neg and pseudomonas coverage  - wound care consulted, continue BID dressing changes by nursing   -- ana added to promote wound healing  - LE US negative for DVT  - elevate extremity    Delirium  - discontinued benadryl and hydroxyzine given delirium overnight   - patient requiring IM Zyprexa x 1    Intertrigo  - Continue miconazole cream    Trochanteric bursitis of right hip  - Scheduled tylenol  - Lidocaine jelly  - Voltaren gel     Hyperkalemia  Stage 3b chronic kidney disease  - overnight 01/05 K 6.6 - given calcium gluconate, albuterol, and lokelma  - 01/08 K 5  - likely 2/2 JOSHUA  - continue to shift with scheduled lokelma  - monitor on tele    JOSHUA (acute kidney injury)  - Creatinine 1.5 on admission, Cr today Estimated Creatinine Clearance: 29.5 mL/min (based on SCr of 1.3 mg/dL). (baseline 1.2)  - Suspect pt has prerenal azotemia 2/2 decreased PO intake  - UA with multiple hyaline casts   - Monitor BMP daily, replete electrolytes if necessary  - Renally adjust drugs to CrCl; avoid nephrotoxic drugs  - s/p IVFs   - Hold lasix, encourage PO intake  - Give NS 500cc 01/08  - Strict I/Os    Chronic diastolic heart failure  - pt complaining of occasional SOB with volume overload  -   - CXR with coarse interstitial lung markings and cardiomegaly   - last echo below  - Holding home lasix in setting of JOSHUA - restart when clinically  appropriate   - strict I/Os, daily weights    Results for orders placed during the hospital encounter of 10/07/22  Echo  Interpretation Summary  · The left ventricle is normal in size with concentric remodeling and normal systolic function.  · The estimated ejection fraction is 55-60%.  · Grade III left ventricular diastolic dysfunction.  · Mild mitral regurgitation.  · Normal right ventricular size with mildly reduced right ventricular systolic function.  · Severe tricuspid regurgitation.  · The estimated PA systolic pressure is 52 mmHg. PASP may be under-estimated due to TR severity.  · Elevated central venous pressure (15 mmHg).  · There is pulmonary hypertension.  · Severe left atrial enlargement.    Chronic atrial fibrillation  - no longer on eliquis s/p Watchman implantation  - in rate controlled afib on tele     Debility  Gait instability  - Discussed plan of care with patient and daughter at bedside who report significant weakness and debility with acute difficulty performing ADLs over the past few weeks and throughout admission  - PT/OT consulted   -- recommending SNF and shower chair at d/c    Primary hypertension  - hold aldactone given JOSHUA    Pure hypercholesterolemia  - continue ASA, statin       VTE Risk Mitigation (From admission, onward)         Ordered     enoxaparin injection 30 mg  Daily         01/05/23 0040     IP VTE HIGH RISK PATIENT  Once         01/05/23 0040                Discharge Planning   GILES: 1/10/2023     Code Status: Full Code   Is the patient medically ready for discharge?: No    Reason for patient still in hospital (select all that apply): Patient trending condition and Treatment  Discharge Plan A: Home Health   Discharge Delays: (!) Change in Medical Condition (Patient had aggitation and confusion last night.)              Paul Curtis PA-C  Department of Hospital Medicine   [facility]

## 2023-01-09 NOTE — ASSESSMENT & PLAN NOTE
Gait instability  - Discussed plan of care with patient and daughter at bedside who report significant weakness and debility with acute difficulty performing ADLs over the past few weeks and throughout admission  - PT/OT consulted   -- recommending SNF and shower chair at d/c

## 2023-01-09 NOTE — NURSING
Wound care completed per orders.   Patient tolerated procedure well with tylenol as a premedication.

## 2023-01-09 NOTE — PT/OT/SLP EVAL
Physical Therapy Co-Evaluation with OT and Treatment     Patient Name:  Jenniffer Jimenez  MRN: [id number]    Admit Date: 1/4/2023  Admitting Diagnosis:  Cellulitis of right lower extremity  Length of Stay: 0 days  Recent Surgery: * No surgery found *      Recommendations:     Discharge Recommendations: Skilled Nursing Facility   Equipment recommendations: shower chair  Barriers to discharge: Increased level of skilled assistance required and Fall risk     Assessment:     Jenniffer Jimenez is a 89 y.o. female [place] [facility] [place] with medical diagnosis of Cellulitis of right lower extremity. Pt presents with weakness, impaired endurance, gait instability, impaired balance, impaired functional mobility, decreased coordination, decreased safety awareness, pain, edema, impaired skin. These deficits effect their roles and responsibilities in which they were able to complete prior to admit.   Pt is pleasant and agreeable to therapy session. Pt able to ambulate ~20 ft in room with RW before stating she felt fatigued and returned to bed. PTA, pt states she was living in [facility] community at [facility]. She recently began using her rollator ~1 month ago (prior to this was furniture walking). Reports (I) with ADLs. Sitter present at end of session.  Jenniffer Jimenez would benefit from acute PT intervention to improve quality of life, focus on recovery of impairments, provide patient/caregiver education, reduce fall risk, and maximize (I) and safety with functional mobility. Once medically stable, recommending pt discharge to Skilled Nursing Facility .      Rehab Prognosis: Good    Plan:     During this hospitalization, patient to be seen 3 x/week to address the identified rehab impairments via gait training, therapeutic activities, therapeutic exercises, neuromuscular re-education and progress towards stated goals.     Plan of Care Expires:  02/08/23  Plan of Care reviewed with: patient    This plan of  "care has been discussed with the patient/caregiver, who was included in its development and is in agreement with the identified goals and treatment plan.     Subjective     Communicated with RN prior to session.  Patient found supine upon PT entry to room, agreeable to evaluation. Pt's sitter present during beginning of session.    Chief Complaint: R leg wound weeping    Patient/Family Comments/goals: "I live with my cat and does what it tells me"    Pain/Comfort:  Pain Rating 1:  (pt did not rate)  Location - Side 1: Right  Location - Orientation 1: generalized  Location 1: leg  Pain Addressed 1: Reposition, Distraction, Cessation of Activity, Nurse notified  Pain Rating Post-Intervention 1:  (pt did not rate)    Patients cultural, spiritual, Faith conflicts given the current situation: None identified     Patient History: information obtained from pt     Living Environment: Pt lives alone at [facility] (independent [facility]) with elevator access to 5th floor.   Prior Level of Function: modified (I) for mobility using rollator; reports (I) ADLs; not driving  DME owned: single point cane and rollator  Support Available/Caregiver Assistance:  states daughter lives 1 mile away and can occasionally (A)    Objective:   OT present for coeval due to pt's multiple medical comorbidities and functional/cognition deficits requiring two skilled therapists to appropriately progress pt's musculoskeletal strength, neuromuscular control, and endurance while taking into consideration medical acuity and pt safety.    Patient found with: peripheral IV, pressure relief boots (telesitter; sitter)    Recent Surgery: * No surgery found *    General Precautions: Standard, fall   Orthopedic Precautions:N/A   Braces: N/A   Oxygen Device: room air      Exams:    Cognition:  Alert  Command following: Follows multistep verbal commands  Communication: clear/fluent    Sensation:   Light touch sensation: Intact " BLEs    Gross Motor Coordination: No deficits noted during functional mobility tasks     Edema/Skin Integrity: weeping RLE in wrapping    Postural examination/scapula alignment: Rounded shoulder and Head forward    Lower Extremity Range of Motion:  Right Lower Extremity: WFL  Left Lower Extremity: WFL    Lower Extremity Strength:  Right Lower Extremity:  grossly 4/5  Left Lower Extremity:  grossly 4/5    Functional Mobility:    Bed Mobility:  Supine > Sit with stand by assistance  Sit > Supine with stand by assistance    Transfers:   Sit <> Stand Transfer: Minimal Assistance x 1 trials from eob with RW AD   Bed <> Chair: deferred due to pt continually getting up without staff per chart review             Gait:  Distance: ~20 ft  Assistance level: Minimal Assistance  Assistive Device: rolling walker  Gait Assessment: decreased step length , decreased marco, decreased gait speed, narrow base of support, and unsteady gait     Balance:  Dynamic Sitting: FAIR+: Maintains balance through MINIMAL excursions of active trunk motion  Standing:  Static: POOR+: Needs MINIMAL assist to maintain   Dynamic: POOR+: Needs MIN (minimal ) assist during gait    Outcome Measure: AM-PAC 6 CLICK MOBILITY  Total Score:17     Patient/Caregiver Education:     Therapist educated pt/caregiver regarding:   PT POC and goals for therapy   Safety with mobility and fall risk   Instruction on use of call button and importance of calling nursing staff for assistance with mobility   Time provided for therapeutic counseling and discussion of current health disposition. All questions answered to satisfaction, within scope of PT practice     Patient/caregiver able to verbalize understanding and expressed no further questions this visit; will follow-up with pt/caregiver during current admit for additional questions/concerns within scope of practice.     White board updated.     Patient left supine with all lines intact, call button in reach, bed alarm  on, RN notified, and sitter present.    GOALS:   Multidisciplinary Problems       Physical Therapy Goals          Problem: Physical Therapy    Goal Priority Disciplines Outcome Goal Variances Interventions   Physical Therapy Goal     PT, PT/OT Ongoing, Progressing     Description: Goals to be met by: 23     Patient will increase functional independence with mobility by performin. Sit to stand transfer with Stand-by Assistance  2. Gait  x 100 feet with Stand-by Assistance using LRAD.   3. Stand for 6 minutes with Stand-by Assistance using LRAD while performing dynamic tasks.                           History:     Past Medical History:   Diagnosis Date    Amblyopia of left eye 4/10/2013    Anxiety     Arthritis     Facet arthropathy, Lumbosacral    Cataract     Central retinal vein occlusion of left eye     Depression     Diabetic polyneuropathy 2022    Exotropia of both eyes 2013    recession RSR 5.0mm w/ adj; recession LR os 5.0 w/ adj; resect MR os  4.0mm    Hearing loss     History of resection of small bowel     Hypertensive retinopathy of both eyes     Hypoglycemia     Macular degeneration     OA (osteoarthritis) of shoulder     Right    Osteoporosis     Posterior vitreous detachment of both eyes     Rhinitis     TIA (transient ischemic attack)        Past Surgical History:   Procedure Laterality Date    APPENDECTOMY      CARDIAC CATHETERIZATION      CATARACT EXTRACTION W/  INTRAOCULAR LENS IMPLANT Bilateral      SECTION, CLASSIC      CLOSURE OF LEFT ATRIAL APPENDAGE USING DEVICE N/A 2020    Procedure: Left atrial appendage closure device;  Surgeon: Abundio Curtis MD;  Location: [facility] CATH LAB;  Service: Cardiology;  Laterality: N/A;    HYSTERECTOMY      INNER EAR SURGERY      JOINT REPLACEMENT      LEFT KNEE REPLACEMENT IN  -    OOPHORECTOMY      SINUS SURGERY      STRABISMUS SURGERY  13    RSR recession 5 mm, LLR recession 5 mm and LMR resection 4mm    STRABISMUS  SURGERY  03/19/2014    recess LR OD 6mm    TONSILLECTOMY      watchman surgery N/A 11/2020       Time Tracking:     PT Received On: 01/09/23  PT Start Time: 1021     PT Stop Time: 1045  PT Total Time (min): 24 min     Billable Minutes: Evaluation 8 and Gait Training 16    01/09/2023

## 2023-01-09 NOTE — ASSESSMENT & PLAN NOTE
- discontinued benadryl and hydroxyzine given delirium overnight   - patient requiring IM Zyprexa x 1

## 2023-01-09 NOTE — PROGRESS NOTES
VANCOMYCIN DOSING BY PHARMACY DISCONTINUATION NOTE    Jenniffer Jimenez is a 89 y.o. female who had been consulted for vancomycin dosing.    The pharmacy consult for vancomycin dosing has been discontinued.     Vancomycin Dosing by Pharmacy Consult will sign-off. Please reconsult if necessary. Thank you for allowing us to participate in this patient's care.     Thank you for the consult,   Abebe Arana, Pharm.D.  Inpatient Pharmacist  EXT [phone]

## 2023-01-09 NOTE — ASSESSMENT & PLAN NOTE
- Creatinine 1.5 on admission, Cr today Estimated Creatinine Clearance: 29.5 mL/min (based on SCr of 1.3 mg/dL). (baseline 1.2)  - Suspect pt has prerenal azotemia 2/2 decreased PO intake  - UA with multiple hyaline casts   - Monitor BMP daily, replete electrolytes if necessary  - Renally adjust drugs to CrCl; avoid nephrotoxic drugs  - s/p IVFs   - Hold lasix, encourage PO intake  - Give NS 500cc 01/08  - Strict I/Os

## 2023-01-09 NOTE — PLAN OF CARE
Problem: Occupational Therapy  Goal: Occupational Therapy Goal  Description: Goals to be met by: 1/16/2023 (1 week)     Patient will increase functional independence with ADLs by performing:    UE Dressing with Supervision.  LE Dressing with Supervision.  Grooming while standing at sink with Stand-by Assistance.  Toileting from toilet with Stand-by Assistance for hygiene and clothing management.   Rolling to Bilateral with [place].   Supine to sit with [place].  Step transfer with Stand-by Assistance  Toilet transfer to toilet with Stand-by Assistance.      Evaluated pt and established OT POC. Continue OT as tolerated.  Fatoumata Cardoza  1/9/2023    Outcome: Ongoing, Progressing

## 2023-01-09 NOTE — PLAN OF CARE
"Significant Event Note:    - Provider called to bedside around 1240 by request from patient's daughter at bedside. Pt's daughter reported pt in significant pain. Pt appears comfortable on my assessment and denies worsening pain after receiving scheduled and prn analgesics. Plan of care discussed with daughter at bedside. All questions were answered. Patient and daughter amenable to plan of care.     - Provider called to bedside by nursing around 1500 for disorientation, agitation, and pt attempting to get out of bed without assistance. On my assessment, pt confused and disoriented reporting she "needs to leave to go babysit the kids." When asked to clarify, pt disorganized in speech. Neuro exam without focal deficits. Pt reoriented to room and situation. Tele sitter ordered.      - Provider notified by nursing at 1600 that pt increasingly more agitated and anxious appearing saying "I can't breathe. I'm having a stroke." PRN vistaril ordered and given.     - Provider notified by nursing at 1700 that pt removed IV and remains agitated requiring frequent re-direction. Seroquel 12.5 mg ordered to be given with dinner.     - Discussed with charge nurse  - Plan to have rapid team attempt IV access following Seroquel administration      Lorena Moore PA-C    "

## 2023-01-09 NOTE — PROGRESS NOTES
"Pharmacokinetic Assessment Follow Up: IV Vancomycin    Vancomycin serum concentration assessment(s):    The random level was drawn correctly and can be used to guide therapy at this time. The measurement is within the desired definitive target range of 10 to 15 mcg/mL.    Vancomycin Regimen Plan:    Ordered vancomycin 750 mg to be given now.  Re-dose when the random level is less than 15 mcg/mL, next level to be drawn at with AM draw on 01/10/23.    Drug levels (last 3 results):  Recent Labs   Lab Result Units 01/07/23  0526 01/08/23  0233 01/09/23  0356   Vancomycin, Random ug/mL 18.6 21.8 14.8       Pharmacy will continue to follow and monitor vancomycin.    Please contact pharmacy at extension [phone] for questions regarding this assessment.    Thank you for the consult,   Abebe Arana       Patient brief summary:  Jenniffer Jimenez is a 89 y.o. female initiated on antimicrobial therapy with IV Vancomycin for treatment of skin & soft tissue infection    The patient's current regimen is pulse dosing.    Drug Allergies:   Review of patient's allergies indicates:   Allergen Reactions    Opioids - morphine analogues Other (See Comments)     Bowel issues; bowel obstruction    Tizanidine Other (See Comments)     "Lips were numb,  Almost passed out."    Tramadol Hallucinations    Beta-blockers (beta-adrenergic blocking agts) Other (See Comments)     Can not go on beta blockers for long period of time - due to taking allergy injections    Morphine     Opioids-meperidine and related     Ciprofloxacin Rash       Actual Body Weight:   76.8 kg    Renal Function:   Estimated Creatinine Clearance: 27.4 mL/min (based on SCr of 1.4 mg/dL).,   \    CBC (last 72 hours):  Recent Labs   Lab Result Units 01/07/23  0526 01/08/23  0807   WBC K/uL 4.51 4.31   Hemoglobin g/dL 8.5* 9.2*   Hematocrit % 26.8* 28.3*   Platelets K/uL 193 237   Gran % % 66.7 83.0*   Lymph % % 14.9* 11.0*   Mono % % 13.7 4.0   Eosinophil % % 1.6 1.0   Basophil " % % 0.4 0.0   Differential Method  Automated Manual       Metabolic Panel (last 72 hours):  Recent Labs   Lab Result Units 01/06/23  0840 01/06/23  1147 01/06/23  1618 01/06/23 2003 01/07/23  0018 01/07/23  0526 01/07/23  1549 01/07/23  1822 01/07/23  2027 01/07/23  2351 01/08/23  0233 01/08/23  0807 01/08/23  1221 01/08/23  1516 01/08/23  1942 01/08/23  2351   Sodium mmol/L 139 135* 134* 134* 133* 135*  135* 131*  --  135* 134* 134* 134* 134* 136 133* 134*   Potassium mmol/L 6.0*  6.0* 5.3*  5.3* 4.9  4.9 5.0  5.0 4.7  4.7 4.9  4.9  4.9 5.1  5.1  --  6.6*  6.6* 4.5  4.5 5.0  5.0 5.3*  5.3* 4.8  4.8 4.4  4.4 4.4  4.4 4.4  4.4   Chloride mmol/L 110 105 106 105 104 105  105 103  --  107 104 105 104 103 105 104 105   CO2 mmol/L 20* 23 18* 19* 21* 21*  21* 19*  --  16* 20* 18* 22* 22* 19* 17* 17*   Glucose mg/dL 138* 139* 187* 191* 153* 129*  129* 111*  --  138* 173* 142* 109 105 117* 136* 114*   Glucose, UA   --   --   --   --   --   --   --  Negative  --   --   --   --   --   --   --   --    BUN mg/dL 39* 37* 39* 39* 41* 44*  44* 45*  --  47* 52* 49* 49* 48* 48* 47* 45*   Creatinine mg/dL 1.1 1.2 1.4 1.4 1.3 1.4  1.4 1.6*  --  1.7* 1.6* 1.5* 1.5* 1.5* 1.5* 1.4 1.4   Magnesium mg/dL  --   --   --   --   --  2.1  --   --   --   --   --   --   --   --   --   --    Phosphorus mg/dL  --   --   --   --   --  4.7*  --   --   --   --   --   --   --   --   --   --        Vancomycin Administrations:  vancomycin given in the last 96 hours                     vancomycin (VANCOCIN) 1,000 mg in dextrose 5 % 250 mL IVPB (mg) 1,000 mg New Bag 01/07/23 1154    vancomycin 1,500 mg in dextrose 5 % 250 mL IVPB (mg) 1,500 mg New Bag 01/06/23 1053                    Microbiologic Results:  Microbiology Results (last 7 days)       Procedure Component Value Units Date/Time    Blood culture [[id number]] Collected: 01/06/23 1619    Order Status: Completed Specimen: Blood Updated: 01/08/23 1812     Blood Culture, Routine  No Growth to date      No Growth to date      No Growth to date    Narrative:      Site 1    Blood culture [[id number]] Collected: 01/06/23 1619    Order Status: Completed Specimen: Blood Updated: 01/08/23 1812     Blood Culture, Routine No Growth to date      No Growth to date      No Growth to date    Narrative:      Site 2    Culture, Anaerobe [[id number]]     Order Status: No result Specimen: Wound from Leg, Right     Aerobic culture [[id number]]     Order Status: No result Specimen: Wound from Leg, Right

## 2023-01-09 NOTE — PT/OT/SLP EVAL
"Occupational Therapy   Co-Evaluation and Co-Treatment   Co-treatment with PT for maximal pt participation, safety, and activity tolerance       Name: Jenniffer Jimenez  MRN: [id number]  Admitting Diagnosis: Cellulitis of right lower extremity  Recent Surgery: * No surgery found *      Recommendations:     Discharge Recommendations: nursing facility, skilled  Discharge Equipment Recommendations:  shower chair  Barriers to discharge:  Inaccessible home environment, Decreased caregiver support    Assessment:     Jenniffer Jimenez is a 89 y.o. female with a medical diagnosis of Cellulitis of right lower extremity.  She presents with impaired ADL and mobility performance deficits. Pt oriented x4 and followed all commands with cues 2/2 pt [place]. Pt required SBA for bed mobility and min A to ambulate in room with RW. Pt with limited environmental scanning and poor posturing. Pt also limited 2/2 RLE discomfort with mobility. PTA, pt was mod I for ADLs and mobility and living alone. Pt would benefit from continued OT skilled services 4x/wk to improve daily living skills to optimize QOL.  Pt is recommended to discharge to SNF at this time.  Performance deficits affecting function: weakness, impaired functional mobility, impaired endurance, gait instability, impaired balance, impaired self care skills.      Rehab Prognosis: Good; patient would benefit from acute skilled OT services to address these deficits and reach maximum level of function.       Plan:     Patient to be seen 4 x/week to address the above listed problems via self-care/home management, therapeutic activities, therapeutic exercises  Plan of Care Expires: 02/09/23  Plan of Care Reviewed with: patient    Subjective     Chief Complaint: RLE discomfort at EOB   Patient/Family Comments/goals: "I do whatever my cat tells me to do!"    Occupational Profile:  Living Environment: Pt lives alone in a [facility]. Apt on 5th floor with elevator access.  Previous " level of function: Pt states using no AD in house (furniture A), rollator in community  Roles and Routines: Pt not driving; spends time with cats  Equipment Used at Home: cane, straight, rollator  Assistance upon Discharge: Daughter lives near    Pain/Comfort:  Pain Rating 1: other (see comments) (R leg discomfort)  Location - Side 1: Right  Location - Orientation 1: generalized  Location 1: leg  Pain Addressed 1: Reposition, Distraction, Cessation of Activity    Patients cultural, spiritual, Tenriism conflicts given the current situation: no    Objective:     Communicated with: RN prior to session.  Patient found HOB elevated with peripheral IV, bed alarm upon OT entry to room.    General Precautions: Standard, fall  Orthopedic Precautions: N/A  Braces: N/A  Respiratory Status: Room air    Occupational Performance:    Bed Mobility:    Patient completed Rolling/Turning to Right with stand by assistance  Patient completed Scooting/Bridging with stand by assistance  Patient completed Supine to Sit with stand by assistance  Patient completed Sit to Supine with stand by assistance    Functional Mobility/Transfers:  Patient completed Sit <> Stand Transfer with minimum assistance  with  rolling walker   Functional Mobility: Pt stood and mobilized 1 full lap in room with min A-RW    Activities of Daily Living:  Grooming: setup A to wash face at EOB   Upper Body Dressing: minimum assistance donning gown   Lower Body Dressing: total assistance donning  socks at EOB     Cognitive/Visual Perceptual:  Cognitive/Psychosocial Skills:     -       Oriented to: Person, Place, Time, and Situation   -       Follows Commands/attention:Follows multistep  commands  -       Communication: clear/fluent  -       Memory: No Deficits noted  -       Safety awareness/insight to disability: intact   -       Mood/Affect/Coping skills/emotional control: Pleasant    Physical Exam:  Balance:    -       demo good sitting and fair standing  balance with RW, narrow ROSETTA   Postural examination/scapula alignment:    -       Kyphosis  Dominant hand:    -       right  Upper Extremity Range of Motion:     -       Right Upper Extremity: WFL  -       Left Upper Extremity: WFL  Upper Extremity Strength:    -       Right Upper Extremity: WFL  -       Left Upper Extremity: WFL   Strength:    -       Right Upper Extremity: WFL  -       Left Upper Extremity: WFL    AMPAC 6 Click ADL:  AMPA Total Score: 14    Treatment & Education:  Pt educated on role of occupational therapy, POC, and safety during ADLs and functional mobility. Pt and OT discussed importance of safe, continued mobility to optimize daily living skills. Pt verbalized understanding.     White board updated during session. Pt given instruction to call for medical staff/nurse for assistance.       Patient left HOB elevated with all lines intact, call button in reach, and RN notified    GOALS:   Multidisciplinary Problems       Occupational Therapy Goals          Problem: Occupational Therapy    Goal Priority Disciplines Outcome Interventions   Occupational Therapy Goal     OT, PT/OT Ongoing, Progressing    Description: Goals to be met by: 1/16/2023 (1 week)     Patient will increase functional independence with ADLs by performing:    UE Dressing with Supervision.  LE Dressing with Supervision.  Grooming while standing at sink with Stand-by Assistance.  Toileting from toilet with Stand-by Assistance for hygiene and clothing management.   Rolling to Bilateral with [place].   Supine to sit with [place].  Step transfer with Stand-by Assistance  Toilet transfer to toilet with Stand-by Assistance.                         History:     Past Medical History:   Diagnosis Date    Amblyopia of left eye 4/10/2013    Anxiety     Arthritis     Facet arthropathy, Lumbosacral    Cataract     Central retinal vein occlusion of left eye     Depression     Diabetic polyneuropathy 1/6/2022    Exotropia of both  eyes 2013    recession RSR 5.0mm w/ adj; recession LR os 5.0 w/ adj; resect MR os  4.0mm    Hearing loss     History of resection of small bowel     Hypertensive retinopathy of both eyes     Hypoglycemia     Macular degeneration     OA (osteoarthritis) of shoulder     Right    Osteoporosis     Posterior vitreous detachment of both eyes     Rhinitis     TIA (transient ischemic attack)          Past Surgical History:   Procedure Laterality Date    APPENDECTOMY      CARDIAC CATHETERIZATION      CATARACT EXTRACTION W/  INTRAOCULAR LENS IMPLANT Bilateral      SECTION, CLASSIC      CLOSURE OF LEFT ATRIAL APPENDAGE USING DEVICE N/A 2020    Procedure: Left atrial appendage closure device;  Surgeon: Abundio Curtis MD;  Location: [facility] CATH LAB;  Service: Cardiology;  Laterality: N/A;    HYSTERECTOMY      INNER EAR SURGERY      JOINT REPLACEMENT      LEFT KNEE REPLACEMENT IN  -    OOPHORECTOMY      SINUS SURGERY      STRABISMUS SURGERY  13    RSR recession 5 mm, LLR recession 5 mm and LMR resection 4mm    STRABISMUS SURGERY  2014    recess LR OD 6mm    TONSILLECTOMY      watchman surgery N/A 2020       Time Tracking:     OT Date of Treatment: 23  OT Start Time: 1021  OT Stop Time: 1043  OT Total Time (min): 22 min    Billable Minutes:Evaluation 11 min  Self Care/Home Management 11 min    2023

## 2023-01-10 LAB
ALBUMIN SERPL BCP-MCNC: 2.7 G/DL (ref 3.5–5.2)
ALP SERPL-CCNC: 117 U/L (ref 55–135)
ALT SERPL W/O P-5'-P-CCNC: 13 U/L (ref 10–44)
ANION GAP SERPL CALC-SCNC: 12 MMOL/L (ref 8–16)
ANION GAP SERPL CALC-SCNC: 13 MMOL/L (ref 8–16)
ANION GAP SERPL CALC-SCNC: 13 MMOL/L (ref 8–16)
AST SERPL-CCNC: 15 U/L (ref 10–40)
BILIRUB SERPL-MCNC: 0.5 MG/DL (ref 0.1–1)
BUN SERPL-MCNC: 47 MG/DL (ref 8–23)
BUN SERPL-MCNC: 53 MG/DL (ref 8–23)
BUN SERPL-MCNC: 53 MG/DL (ref 8–23)
CALCIUM SERPL-MCNC: 9.2 MG/DL (ref 8.7–10.5)
CALCIUM SERPL-MCNC: 9.2 MG/DL (ref 8.7–10.5)
CALCIUM SERPL-MCNC: 9.4 MG/DL (ref 8.7–10.5)
CHLORIDE SERPL-SCNC: 106 MMOL/L (ref 95–110)
CHLORIDE SERPL-SCNC: 106 MMOL/L (ref 95–110)
CHLORIDE SERPL-SCNC: 108 MMOL/L (ref 95–110)
CO2 SERPL-SCNC: 20 MMOL/L (ref 23–29)
CO2 SERPL-SCNC: 20 MMOL/L (ref 23–29)
CO2 SERPL-SCNC: 21 MMOL/L (ref 23–29)
CREAT SERPL-MCNC: 1.2 MG/DL (ref 0.5–1.4)
CREAT SERPL-MCNC: 1.4 MG/DL (ref 0.5–1.4)
CREAT SERPL-MCNC: 1.4 MG/DL (ref 0.5–1.4)
EST. GFR  (NO RACE VARIABLE): 35.7 ML/MIN/1.73 M^2
EST. GFR  (NO RACE VARIABLE): 35.7 ML/MIN/1.73 M^2
EST. GFR  (NO RACE VARIABLE): 43 ML/MIN/1.73 M^2
GLUCOSE SERPL-MCNC: 104 MG/DL (ref 70–110)
GLUCOSE SERPL-MCNC: 152 MG/DL (ref 70–110)
GLUCOSE SERPL-MCNC: 152 MG/DL (ref 70–110)
POTASSIUM SERPL-SCNC: 3.6 MMOL/L (ref 3.5–5.1)
POTASSIUM SERPL-SCNC: 3.6 MMOL/L (ref 3.5–5.1)
POTASSIUM SERPL-SCNC: 4 MMOL/L (ref 3.5–5.1)
PROT SERPL-MCNC: 6.2 G/DL (ref 6–8.4)
SODIUM SERPL-SCNC: 139 MMOL/L (ref 136–145)
SODIUM SERPL-SCNC: 139 MMOL/L (ref 136–145)
SODIUM SERPL-SCNC: 141 MMOL/L (ref 136–145)

## 2023-01-10 PROCEDURE — 63600175 PHARM REV CODE 636 W HCPCS: Mod: HCNC | Performed by: PHYSICIAN ASSISTANT

## 2023-01-10 PROCEDURE — 36415 COLL VENOUS BLD VENIPUNCTURE: CPT | Mod: HCNC

## 2023-01-10 PROCEDURE — 25000003 PHARM REV CODE 250: Mod: HCNC | Performed by: NURSE PRACTITIONER

## 2023-01-10 PROCEDURE — 25000003 PHARM REV CODE 250: Mod: HCNC | Performed by: HOSPITALIST

## 2023-01-10 PROCEDURE — 80048 BASIC METABOLIC PNL TOTAL CA: CPT | Mod: HCNC,XB

## 2023-01-10 PROCEDURE — 80053 COMPREHEN METABOLIC PANEL: CPT | Mod: HCNC

## 2023-01-10 PROCEDURE — 99233 SBSQ HOSP IP/OBS HIGH 50: CPT | Mod: HCNC,,,

## 2023-01-10 PROCEDURE — 25000003 PHARM REV CODE 250: Mod: HCNC

## 2023-01-10 PROCEDURE — 11000001 HC ACUTE MED/SURG PRIVATE ROOM: Mod: HCNC

## 2023-01-10 PROCEDURE — 25000003 PHARM REV CODE 250: Mod: HCNC | Performed by: PHYSICIAN ASSISTANT

## 2023-01-10 PROCEDURE — 99233 PR SUBSEQUENT HOSPITAL CARE,LEVL III: ICD-10-PCS | Mod: HCNC,,,

## 2023-01-10 PROCEDURE — S0166 INJ OLANZAPINE 2.5MG: HCPCS | Mod: HCNC | Performed by: NURSE PRACTITIONER

## 2023-01-10 RX ORDER — SPIRONOLACTONE 25 MG/1
25 TABLET ORAL 2 TIMES DAILY
Status: DISCONTINUED | OUTPATIENT
Start: 2023-01-10 | End: 2023-01-14

## 2023-01-10 RX ORDER — FUROSEMIDE 20 MG/1
20 TABLET ORAL 2 TIMES DAILY
Status: DISCONTINUED | OUTPATIENT
Start: 2023-01-10 | End: 2023-01-18

## 2023-01-10 RX ORDER — OLANZAPINE 2.5 MG/1
2.5 TABLET ORAL DAILY
Status: DISCONTINUED | OUTPATIENT
Start: 2023-01-10 | End: 2023-01-11

## 2023-01-10 RX ADMIN — FAMOTIDINE 20 MG: 20 TABLET ORAL at 08:01

## 2023-01-10 RX ADMIN — MICONAZOLE NITRATE: 20 CREAM TOPICAL at 08:01

## 2023-01-10 RX ADMIN — SENNOSIDES 8.6 MG: 8.6 TABLET, FILM COATED ORAL at 08:01

## 2023-01-10 RX ADMIN — Medication 6 MG: at 09:01

## 2023-01-10 RX ADMIN — SILVER SULFADIAZINE: 10 CREAM TOPICAL at 08:01

## 2023-01-10 RX ADMIN — PRAVASTATIN SODIUM 40 MG: 40 TABLET ORAL at 08:01

## 2023-01-10 RX ADMIN — MICONAZOLE NITRATE: 20 CREAM TOPICAL at 09:01

## 2023-01-10 RX ADMIN — ENOXAPARIN SODIUM 30 MG: 30 INJECTION SUBCUTANEOUS at 05:01

## 2023-01-10 RX ADMIN — SILVER SULFADIAZINE: 10 CREAM TOPICAL at 09:01

## 2023-01-10 RX ADMIN — DICLOFENAC SODIUM 2 G: 10 GEL TOPICAL at 08:01

## 2023-01-10 RX ADMIN — DICLOFENAC SODIUM 2 G: 10 GEL TOPICAL at 09:01

## 2023-01-10 RX ADMIN — SODIUM ZIRCONIUM CYCLOSILICATE 10 G: 10 POWDER, FOR SUSPENSION ORAL at 08:01

## 2023-01-10 RX ADMIN — ASPIRIN 81 MG: 81 TABLET, COATED ORAL at 08:01

## 2023-01-10 RX ADMIN — SPIRONOLACTONE 25 MG: 25 TABLET, FILM COATED ORAL at 09:01

## 2023-01-10 RX ADMIN — DOXYCYCLINE HYCLATE 100 MG: 100 TABLET, COATED ORAL at 08:01

## 2023-01-10 RX ADMIN — OLANZAPINE 2.5 MG: 2.5 TABLET, FILM COATED ORAL at 10:01

## 2023-01-10 RX ADMIN — FUROSEMIDE 20 MG: 20 TABLET ORAL at 05:01

## 2023-01-10 RX ADMIN — DOXYCYCLINE HYCLATE 100 MG: 100 TABLET, COATED ORAL at 09:01

## 2023-01-10 RX ADMIN — CIPROFLOXACIN HYDROCHLORIDE 500 MG: 500 TABLET, FILM COATED ORAL at 08:01
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
## 2023-01-10 NOTE — PLAN OF CARE
01/10/23 1106   Discharge Reassessment   Assessment Type Discharge Planning Reassessment   Did the patient's condition or plan change since previous assessment? Yes  (Acutely confused)   Discharge Plan discussed with: Adult children   Discharge Plan A Skilled Nursing Facility   Discharge Plan B Home Health   DME Needed Upon Discharge  bedside commode   Discharge Barriers Identified   (Medical condition)   Why the patient remains in the hospital Requires continued medical care   Post-Acute Status   Post-Acute Authorization Placement   Post-Acute Placement Status Referrals Sent   Discharge Delays   (Medical condition)

## 2023-01-10 NOTE — ASSESSMENT & PLAN NOTE
- pt complaining of occasional SOB with volume overload  -   - CXR with coarse interstitial lung markings and cardiomegaly   - last echo below  - JOSHUA resolution, resume home lasix   - strict I/Os, daily weights    Results for orders placed during the hospital encounter of 10/07/22  Echo  Interpretation Summary  · The left ventricle is normal in size with concentric remodeling and normal systolic function.  · The estimated ejection fraction is 55-60%.  · Grade III left ventricular diastolic dysfunction.  · Mild mitral regurgitation.  · Normal right ventricular size with mildly reduced right ventricular systolic function.  · Severe tricuspid regurgitation.  · The estimated PA systolic pressure is 52 mmHg. PASP may be under-estimated due to TR severity.  · Elevated central venous pressure (15 mmHg).  · There is pulmonary hypertension.  · Severe left atrial enlargement.
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
## 2023-01-10 NOTE — ASSESSMENT & PLAN NOTE
- Creatinine 1.5 on admission, Cr today Estimated Creatinine Clearance: 31.9 mL/min (based on SCr of 1.2 mg/dL). (baseline 1.2)  - JOSHUA resolution, back to baseline Cr 1.2  - resume home lasix and aldactone
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
## 2023-01-10 NOTE — NURSING
Patient continuing to be progressively agitated and restless despite medication given and  restraints.  Patient yelling and still attempting  to  get  out  of bed.  Sitter at bedside.  q2hr restraint checks performed.  Will report any changes.
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
## 2023-01-10 NOTE — PLAN OF CARE
Patient is alert to self and occasionally place. Sitter has been at bedside throughout the shift for safety. She was cooperative and pleasantly confused throughout the day. Patient had an afternoon nap and after waking up she was more confused and has tried to get out of bed. Opioids were discontinued today. Wound care done to right leg. Next dressing change is tonight during night shift. Patient's daughter was at bedside. Potassium is improving and is now 3.9. New IV started to left forearm. Patient worked with PT today and ambulated in the room with walker. She is able to independently turn in bed. She started doxycycline. Discharge plan will be to SNF or home with [facility]. Call light and sitter for safety. Q2h rounds done.    Problem: Adult Inpatient Plan of Care  Goal: Plan of Care Review  Outcome: Ongoing, Progressing     Problem: Infection  Goal: Absence of Infection Signs and Symptoms  Outcome: Ongoing, Progressing     Problem: Renal Function Impairment (Acute Kidney Injury/Impairment)  Goal: Effective Renal Function  Outcome: Ongoing, Progressing

## 2023-01-10 NOTE — ASSESSMENT & PLAN NOTE
- acute on chronic issue  - afebrile without leukocytosis  - ESR, CRP elevated  - Increased pain control to include scheduled tylenol, prn oxy 5 q6h and oxy 15 mg q6 prn (to be used with dressing changes)    - bowel regimen in place to prevent opioid induced constipation  - discontinue IV vanc  - continue cipro and start doxy for gram neg and pseudomonas coverage  - wound care consulted, continue BID dressing changes by nursing   -- ana added to promote wound healing  - LE US negative for DVT  - elevate extremity   Detail Level: Detailed

## 2023-01-10 NOTE — ASSESSMENT & PLAN NOTE
Stage 3b chronic kidney disease  - overnight 01/05 K 6.6 - given calcium gluconate, albuterol, and lokelma  - scheduled lokelma discontinued, potassium normalized  - monitor on tele

## 2023-01-10 NOTE — PROGRESS NOTES
[facility] [address] [place] [address] Medicine  Progress Note    Patient Name: Jenniffer Jimenez  MRN: [id number]  Patient Class: IP- Inpatient   Admission Date: 1/4/2023  Length of Stay: 1 days  Attending Physician: Nely Chahal MD  Primary Care Provider: Rubi Young DO        Subjective:     Principal Problem:Cellulitis of right lower extremity        HPI:  Jenniffer Jimenez is a 89 y.o. female with a PMHx of HLD, HTN, AF s/p watchman, chronic cellulitis/ wound of her right lower extremity who presents to [facility] [place] for evaluation of worsening redness and pain to her right lwoer extremity. Patient is a poor historian and has difficulty hearing. Per ED note, patient receives wound care for chronic RLE cellulitis. Wound care nuse noted worsening redness, weeping, and pain to her RLE. Apparently patient was supposed to be on ciprofloxacin but had a bad reaction so she has been off of it for the past 2 weeks.  The patient and has not had an alternative antibiotic prescribed but reportedly is supposed to be on an antibiotic. Patient also complains of intermittent shortness of breath recently which mostly occurs on exertion. Denies any fever, nausea, vomiting, chest pain, numbness/tingling, weakness, slurred speech, or recent falls.     ED: AFVSS. No leukocytosis. K 5.9, Cr 1.5 (BL ~1.2). EKG, CXR pending. Given IV vanc.      Overview/Hospital Course:  Pt placed in observation for management of worsening RLE cellulitis. Pt receiving IV vancomycin and ciprofloxacin initially, vancomycin discontinued. Continue ciprofloxacin, start doxycycline. Psych consulted for worsening delirium and agitation. Plan to discharge home with home health when clinically stable.       Interval History:   Overnight patient patient becoming agitated and physically aggressive, hitting technician, requiring restraints and IM zyprexa x 1. VSSAF on my exam, accompanied by daughter, Afshan, bedside. Patient with worsening delirium. Psychiatry  consulted, will follow recs. Continue cipro and doxy for cellulitis. Blood cultures NGTD. PT recommending SNF. CM/SW consult for discharge planning and DME. Patient refusing CBC this am. CMP without emergent abnormalities. No further complaints on exam.     Review of Systems   Constitutional:  Negative for appetite change, chills and fever.   HENT:  Negative for congestion, trouble swallowing and voice change.    Eyes:  Negative for photophobia and visual disturbance.   Respiratory:  Negative for cough, chest tightness and wheezing.    Cardiovascular:  Positive for leg swelling. Negative for chest pain and palpitations.   Gastrointestinal:  Negative for abdominal pain, constipation, diarrhea, nausea and vomiting.   Endocrine: Negative for polyphagia and polyuria.   Genitourinary:  Negative for decreased urine volume, difficulty urinating, dysuria, flank pain and hematuria.   Musculoskeletal:  Positive for arthralgias (R hip). Negative for back pain and gait problem.   Skin:  Positive for color change and wound.   Neurological:  Negative for dizziness, seizures, syncope, weakness, numbness and headaches.   Psychiatric/Behavioral:  Negative for agitation, behavioral problems and confusion.    Objective:     Vital Signs (Most Recent):  Temp: 97.9 °F (36.6 °C) (01/10/23 0857)  Pulse: 85 (01/10/23 0857)  Resp: 20 (01/10/23 0857)  BP: (!) 154/70 (01/10/23 0857)  SpO2: (!) 92 % (01/10/23 0857)   Vital Signs (24h Range):  Temp:  [97.7 °F (36.5 °C)-98.3 °F (36.8 °C)] 97.9 °F (36.6 °C)  Pulse:  [] 85  Resp:  [18-20] 20  SpO2:  [92 %-97 %] 92 %  BP: (101-154)/(50-96) 154/70     Weight: 76.8 kg (169 lb 5 oz)  Body mass index is 28.18 kg/m².    Intake/Output Summary (Last 24 hours) at 1/10/2023 1405  Last data filed at 1/10/2023 0900  Gross per 24 hour   Intake --   Output 250 ml   Net -250 ml      Physical Exam  Vitals and nursing note reviewed.   Constitutional:       General: She is not in acute distress.      Appearance: She is well-developed.   HENT:      Head: Normocephalic and atraumatic.      Mouth/Throat:      Mouth: Mucous membranes are dry.      Pharynx: No oropharyngeal exudate.   Eyes:      Extraocular Movements: Extraocular movements intact.      Conjunctiva/sclera: Conjunctivae normal.      Pupils: Pupils are equal, round, and reactive to light.   Cardiovascular:      Rate and Rhythm: Normal rate and regular rhythm.      Heart sounds: Normal heart sounds.   Pulmonary:      Effort: Pulmonary effort is normal. No respiratory distress.      Breath sounds: Normal breath sounds. No wheezing or rales.   Abdominal:      General: Bowel sounds are normal. There is no distension.      Palpations: Abdomen is soft.      Tenderness: There is no abdominal tenderness.      Comments: Erythematous irregular patch to low abdomen and mons pubis with associated excoriations.   Musculoskeletal:         General: Swelling present. No tenderness. Normal range of motion.      Cervical back: Normal range of motion and neck supple.   Lymphadenopathy:      Cervical: No cervical adenopathy.   Skin:     General: Skin is warm and dry.      Capillary Refill: Capillary refill takes less than 2 seconds.      Findings: Erythema present. No rash.      Comments: RLE dressing clean, dry, and intact.    Neurological:      Mental Status: She is alert and oriented to person, place, and time.      Cranial Nerves: No cranial nerve deficit.      Sensory: No sensory deficit.      Coordination: Coordination normal.   Psychiatric:         Behavior: Behavior normal.         Thought Content: Thought content normal.         Judgment: Judgment normal.       Significant Labs: All pertinent labs within the past 24 hours have been reviewed.    CMP:   Recent Labs   Lab 01/09/23  2003 01/10/23  0321 01/10/23  0905    139  139 141   K 4.3  4.3 4.0  4.0  4.0 3.6  3.6    106  106 108   CO2 20* 20*  20* 21*   * 152*  152* 104   BUN 54* 53*   53* 47*   CREATININE 1.4 1.4  1.4 1.2   CALCIUM 8.9 9.2  9.2 9.4   PROT  --  6.2  --    ALBUMIN  --  2.7*  --    BILITOT  --  0.5  --    ALKPHOS  --  117  --    AST  --  15  --    ALT  --  13  --    ANIONGAP 14 13  13 12       Significant Imaging: I have reviewed all pertinent imaging results/findings within the past 24 hours.      Assessment/Plan:      * Cellulitis of right lower extremity  - acute on chronic issue  - afebrile without leukocytosis  - ESR, CRP elevated  - Increased pain control to include scheduled tylenol, prn oxy 5 q6h and oxy 15 mg q6 prn (to be used with dressing changes)    - bowel regimen in place to prevent opioid induced constipation  - discontinue IV vanc  - continue cipro and start doxy for gram neg and pseudomonas coverage  - wound care consulted, continue BID dressing changes by nursing   -- ana added to promote wound healing  - LE US negative for DVT  - elevate extremity    Delirium  - discontinued benadryl and hydroxyzine given delirium overnight   - overnight 01/09-01/10 - patient hitting technician   -- requiring IM Zyprexa x 1 and restraints  - psychiatry consulted, will follow recs    Intertrigo  - Continue miconazole cream    Trochanteric bursitis of right hip  - Scheduled tylenol  - Lidocaine jelly  - Voltaren gel     Hyperkalemia  Stage 3b chronic kidney disease  - overnight 01/05 K 6.6 - given calcium gluconate, albuterol, and lokelma  - scheduled lokelma discontinued, potassium normalized  - monitor on tele    JOSHUA (acute kidney injury)  - Creatinine 1.5 on admission, Cr today Estimated Creatinine Clearance: 31.9 mL/min (based on SCr of 1.2 mg/dL). (baseline 1.2)  - JOSHUA resolution, back to baseline Cr 1.2  - resume home lasix and aldactone    Chronic diastolic heart failure  - pt complaining of occasional SOB with volume overload  -   - CXR with coarse interstitial lung markings and cardiomegaly   - last echo below  - JOSHUA resolution, resume home lasix   - strict  I/Os, daily weights    Results for orders placed during the hospital encounter of 10/07/22  Echo  Interpretation Summary  · The left ventricle is normal in size with concentric remodeling and normal systolic function.  · The estimated ejection fraction is 55-60%.  · Grade III left ventricular diastolic dysfunction.  · Mild mitral regurgitation.  · Normal right ventricular size with mildly reduced right ventricular systolic function.  · Severe tricuspid regurgitation.  · The estimated PA systolic pressure is 52 mmHg. PASP may be under-estimated due to TR severity.  · Elevated central venous pressure (15 mmHg).  · There is pulmonary hypertension.  · Severe left atrial enlargement.    Chronic atrial fibrillation  - no longer on eliquis s/p Watchman implantation  - in rate controlled afib on tele     Debility  Gait instability  - Discussed plan of care with patient and daughter at bedside who report significant weakness and debility with acute difficulty performing ADLs over the past few weeks and throughout admission  - PT/OT consulted   -- recommending SNF and shower chair at d/c    Primary hypertension  - resolution of JOSHUA, restart home aldactone    Pure hypercholesterolemia  - continue ASA, statin       VTE Risk Mitigation (From admission, onward)         Ordered     enoxaparin injection 30 mg  Daily         01/05/23 0040     IP VTE HIGH RISK PATIENT  Once         01/05/23 0040                Discharge Planning   GILES: 1/12/2023     Code Status: Full Code   Is the patient medically ready for discharge?: No    Reason for patient still in hospital (select all that apply): Patient trending condition, Treatment and Consult recommendations  Discharge Plan A: Skilled Nursing Facility   Discharge Delays:  (Medical condition)              JUSTINO QuirozC  Department of Hospital Medicine   [facility] 11[facility]

## 2023-01-11 PROBLEM — L27.0 DRUG ERUPTION: Status: ACTIVE | Noted: 2023-01-11

## 2023-01-11 PROBLEM — R21 RASH AND NONSPECIFIC SKIN ERUPTION: Status: ACTIVE | Noted: 2023-01-11

## 2023-01-11 LAB
ALBUMIN SERPL BCP-MCNC: 2.6 G/DL (ref 3.5–5.2)
ALP SERPL-CCNC: 113 U/L (ref 55–135)
ALT SERPL W/O P-5'-P-CCNC: 13 U/L (ref 10–44)
ANION GAP SERPL CALC-SCNC: 11 MMOL/L (ref 8–16)
AST SERPL-CCNC: 21 U/L (ref 10–40)
BACTERIA #/AREA URNS AUTO: ABNORMAL /HPF
BACTERIA BLD CULT: NORMAL
BACTERIA BLD CULT: NORMAL
BILIRUB SERPL-MCNC: 0.4 MG/DL (ref 0.1–1)
BILIRUB UR QL STRIP: NEGATIVE
BUN SERPL-MCNC: 46 MG/DL (ref 8–23)
CALCIUM SERPL-MCNC: 9 MG/DL (ref 8.7–10.5)
CHLORIDE SERPL-SCNC: 113 MMOL/L (ref 95–110)
CLARITY UR REFRACT.AUTO: CLEAR
CO2 SERPL-SCNC: 20 MMOL/L (ref 23–29)
COLOR UR AUTO: YELLOW
CREAT SERPL-MCNC: 1.1 MG/DL (ref 0.5–1.4)
EST. GFR  (NO RACE VARIABLE): 47.7 ML/MIN/1.73 M^2
GLUCOSE SERPL-MCNC: 139 MG/DL (ref 70–110)
GLUCOSE UR QL STRIP: NEGATIVE
HGB UR QL STRIP: ABNORMAL
HYALINE CASTS UR QL AUTO: 0 /LPF
KETONES UR QL STRIP: NEGATIVE
LEUKOCYTE ESTERASE UR QL STRIP: NEGATIVE
MICROSCOPIC COMMENT: ABNORMAL
NITRITE UR QL STRIP: NEGATIVE
NON-SQ EPI CELLS #/AREA URNS AUTO: 5 /HPF
PH UR STRIP: 5 [PH] (ref 5–8)
POTASSIUM SERPL-SCNC: 3.5 MMOL/L (ref 3.5–5.1)
PROT SERPL-MCNC: 5.9 G/DL (ref 6–8.4)
PROT UR QL STRIP: ABNORMAL
RBC #/AREA URNS AUTO: 3 /HPF (ref 0–4)
SODIUM SERPL-SCNC: 144 MMOL/L (ref 136–145)
SP GR UR STRIP: 1.02 (ref 1–1.03)
SQUAMOUS #/AREA URNS AUTO: 3 /HPF
URN SPEC COLLECT METH UR: ABNORMAL
WBC #/AREA URNS AUTO: 2 /HPF (ref 0–5)

## 2023-01-11 PROCEDURE — 80053 COMPREHEN METABOLIC PANEL: CPT | Mod: HCNC

## 2023-01-11 PROCEDURE — 99222 1ST HOSP IP/OBS MODERATE 55: CPT | Mod: GC,,,

## 2023-01-11 PROCEDURE — 11000001 HC ACUTE MED/SURG PRIVATE ROOM: Mod: HCNC

## 2023-01-11 PROCEDURE — 25000003 PHARM REV CODE 250: Mod: HCNC | Performed by: HOSPITALIST

## 2023-01-11 PROCEDURE — 81001 URINALYSIS AUTO W/SCOPE: CPT | Mod: HCNC | Performed by: HOSPITALIST

## 2023-01-11 PROCEDURE — 36415 COLL VENOUS BLD VENIPUNCTURE: CPT | Mod: HCNC

## 2023-01-11 PROCEDURE — 99222 PR INITIAL HOSPITAL CARE,LEVL II: ICD-10-PCS | Mod: GC,,,

## 2023-01-11 PROCEDURE — 25000003 PHARM REV CODE 250: Mod: HCNC

## 2023-01-11 PROCEDURE — 25000003 PHARM REV CODE 250: Mod: HCNC | Performed by: PHYSICIAN ASSISTANT

## 2023-01-11 PROCEDURE — 99233 SBSQ HOSP IP/OBS HIGH 50: CPT | Mod: HCNC,,,

## 2023-01-11 PROCEDURE — 63600175 PHARM REV CODE 636 W HCPCS: Mod: HCNC | Performed by: PHYSICIAN ASSISTANT

## 2023-01-11 PROCEDURE — 99233 PR SUBSEQUENT HOSPITAL CARE,LEVL III: ICD-10-PCS | Mod: HCNC,,,

## 2023-01-11 RX ORDER — TRIAMCINOLONE ACETONIDE 1 MG/G
OINTMENT TOPICAL 2 TIMES DAILY
Status: DISCONTINUED | OUTPATIENT
Start: 2023-01-11 | End: 2023-01-24 | Stop reason: HOSPADM

## 2023-01-11 RX ORDER — OLANZAPINE 2.5 MG/1
2.5 TABLET ORAL EVERY 8 HOURS PRN
Status: DISCONTINUED | OUTPATIENT
Start: 2023-01-11 | End: 2023-01-24 | Stop reason: HOSPADM

## 2023-01-11 RX ORDER — AMOXICILLIN AND CLAVULANATE POTASSIUM 875; 125 MG/1; MG/1
1 TABLET, FILM COATED ORAL EVERY 12 HOURS
Status: DISCONTINUED | OUTPATIENT
Start: 2023-01-11 | End: 2023-01-13

## 2023-01-11 RX ORDER — OLANZAPINE 2.5 MG/1
2.5 TABLET ORAL ONCE
Status: COMPLETED | OUTPATIENT
Start: 2023-01-11 | End: 2023-01-11

## 2023-01-11 RX ORDER — OLANZAPINE 2.5 MG/1
5 TABLET ORAL NIGHTLY
Status: DISCONTINUED | OUTPATIENT
Start: 2023-01-12 | End: 2023-01-12

## 2023-01-11 RX ADMIN — AMOXICILLIN AND CLAVULANATE POTASSIUM 1 TABLET: 875; 125 TABLET, FILM COATED ORAL at 10:01

## 2023-01-11 RX ADMIN — PRAVASTATIN SODIUM 40 MG: 40 TABLET ORAL at 08:01

## 2023-01-11 RX ADMIN — SILVER SULFADIAZINE: 10 CREAM TOPICAL at 10:01

## 2023-01-11 RX ADMIN — CIPROFLOXACIN HYDROCHLORIDE 500 MG: 500 TABLET, FILM COATED ORAL at 08:01

## 2023-01-11 RX ADMIN — SPIRONOLACTONE 25 MG: 25 TABLET, FILM COATED ORAL at 08:01

## 2023-01-11 RX ADMIN — FUROSEMIDE 20 MG: 20 TABLET ORAL at 08:01

## 2023-01-11 RX ADMIN — ACETAMINOPHEN 1000 MG: 500 TABLET ORAL at 11:01

## 2023-01-11 RX ADMIN — DICLOFENAC SODIUM 2 G: 10 GEL TOPICAL at 10:01

## 2023-01-11 RX ADMIN — DICLOFENAC SODIUM 2 G: 10 GEL TOPICAL at 08:01

## 2023-01-11 RX ADMIN — ASPIRIN 81 MG: 81 TABLET, COATED ORAL at 08:01

## 2023-01-11 RX ADMIN — SILVER SULFADIAZINE: 10 CREAM TOPICAL at 08:01

## 2023-01-11 RX ADMIN — TRIAMCINOLONE ACETONIDE: 1 OINTMENT TOPICAL at 05:01

## 2023-01-11 RX ADMIN — SENNOSIDES 8.6 MG: 8.6 TABLET, FILM COATED ORAL at 08:01

## 2023-01-11 RX ADMIN — ENOXAPARIN SODIUM 30 MG: 30 INJECTION SUBCUTANEOUS at 05:01

## 2023-01-11 RX ADMIN — DOXYCYCLINE HYCLATE 100 MG: 100 TABLET, COATED ORAL at 08:01

## 2023-01-11 RX ADMIN — POLYETHYLENE GLYCOL 3350 17 G: 17 POWDER, FOR SOLUTION ORAL at 08:01

## 2023-01-11 RX ADMIN — DOXYCYCLINE HYCLATE 100 MG: 100 TABLET, COATED ORAL at 10:01

## 2023-01-11 RX ADMIN — ACETAMINOPHEN 1000 MG: 500 TABLET ORAL at 05:01

## 2023-01-11 RX ADMIN — SPIRONOLACTONE 25 MG: 25 TABLET, FILM COATED ORAL at 10:01

## 2023-01-11 RX ADMIN — OLANZAPINE 2.5 MG: 2.5 TABLET, FILM COATED ORAL at 10:01

## 2023-01-11 RX ADMIN — MICONAZOLE NITRATE: 20 CREAM TOPICAL at 10:01

## 2023-01-11 RX ADMIN — POLYETHYLENE GLYCOL 3350 17 G: 17 POWDER, FOR SOLUTION ORAL at 10:01

## 2023-01-11 RX ADMIN — FAMOTIDINE 20 MG: 20 TABLET ORAL at 08:01

## 2023-01-11 RX ADMIN — MICONAZOLE NITRATE: 20 CREAM TOPICAL at 08:01

## 2023-01-11 RX ADMIN — FUROSEMIDE 20 MG: 20 TABLET ORAL at 05:01

## 2023-01-11 RX ADMIN — OLANZAPINE 2.5 MG: 2.5 TABLET, FILM COATED ORAL at 08:01

## 2023-01-11 NOTE — ASSESSMENT & PLAN NOTE
- rash developing over upper lower extremities and abdomen  - nonspecific skin eruption vs medication reaction  - dermatology consulted, will follow recs

## 2023-01-11 NOTE — SUBJECTIVE & OBJECTIVE
Past Medical History:   Diagnosis Date    Amblyopia of left eye 4/10/2013    Anxiety     Arthritis     Facet arthropathy, Lumbosacral    Cataract     Central retinal vein occlusion of left eye     Depression     Diabetic polyneuropathy 2022    Exotropia of both eyes 2013    recession RSR 5.0mm w/ adj; recession LR os 5.0 w/ adj; resect MR os  4.0mm    Hearing loss     History of resection of small bowel     Hypertensive retinopathy of both eyes     Hypoglycemia     Macular degeneration     OA (osteoarthritis) of shoulder     Right    Osteoporosis     Posterior vitreous detachment of both eyes     Rhinitis     TIA (transient ischemic attack)        Past Surgical History:   Procedure Laterality Date    APPENDECTOMY      CARDIAC CATHETERIZATION      CATARACT EXTRACTION W/  INTRAOCULAR LENS IMPLANT Bilateral      SECTION, CLASSIC      CLOSURE OF LEFT ATRIAL APPENDAGE USING DEVICE N/A 2020    Procedure: Left atrial appendage closure device;  Surgeon: Abundio Curtis MD;  Location: [facility] CATH LAB;  Service: Cardiology;  Laterality: N/A;    HYSTERECTOMY      INNER EAR SURGERY      JOINT REPLACEMENT      LEFT KNEE REPLACEMENT IN  -    OOPHORECTOMY      SINUS SURGERY      STRABISMUS SURGERY  13    RSR recession 5 mm, LLR recession 5 mm and LMR resection 4mm    STRABISMUS SURGERY  2014    recess LR OD 6mm    TONSILLECTOMY      watchman surgery N/A 2020     Family History       Problem Relation (Age of Onset)    Breast cancer Maternal Aunt    Diabetes Sister, Brother    Heart disease Sister, Sister    Hypertension Mother, Father    Liver disease Sister    No Known Problems Maternal Uncle, Paternal Aunt, Paternal Uncle, Maternal Grandmother, Maternal Grandfather, Paternal Grandmother, Paternal Grandfather, Daughter, Son, Son, Son          Tobacco Use    Smoking status: Former     Types: Cigarettes     Quit date: 10/29/1982     Years since quittin.2     Passive exposure: Never     "Smokeless tobacco: Never   Substance and Sexual Activity    Alcohol use: Yes     Alcohol/week: 2.0 standard drinks     Types: 2 Shots of liquor per week     Comment: rare    Drug use: No    Sexual activity: Never       Review of patient's allergies indicates:   Allergen Reactions    Opioids - morphine analogues Other (See Comments)     Bowel issues; bowel obstruction    Tizanidine Other (See Comments)     "Lips were numb,  Almost passed out."    Tramadol Hallucinations    Beta-blockers (beta-adrenergic blocking agts) Other (See Comments)     Can not go on beta blockers for long period of time - due to taking allergy injections    Morphine     Opioids-meperidine and related     Ciprofloxacin Rash       Medications:  Continuous Infusions:  Scheduled Meds:   acetaminophen  1,000 mg Oral Q6H    aspirin  81 mg Oral Daily    diclofenac sodium  2 g Topical (Top) BID    doxycycline  100 mg Oral BID    enoxaparin  30 mg Subcutaneous Daily    famotidine  20 mg Oral Daily    furosemide  20 mg Oral BID    miconazole   Topical (Top) BID    OLANZapine  2.5 mg Oral Daily    polyethylene glycol  17 g Oral BID    pravastatin  40 mg Oral Daily    senna  8.6 mg Oral Daily    silver sulfADIAZINE 1%   Topical (Top) BID    spironolactone  25 mg Oral BID     PRN Meds:acetaminophen, albuterol-ipratropium, bisacodyL, dextrose 10%, dextrose 10%, glucagon (human recombinant), glucose, glucose, LIDOcaine HCl 2%, melatonin, ondansetron, polyethylene glycol    Review of Systems   Unable to perform ROS  Objective:     Vital Signs (Most Recent):  Temp: 97.4 °F (36.3 °C) (01/11/23 1137)  Pulse: 77 (01/11/23 1137)  Resp: 18 (01/11/23 1137)  BP: (!) 119/58 (01/11/23 1137)  SpO2: (!) 93 % (01/11/23 1137)   Vital Signs (24h Range):  Temp:  [97.4 °F (36.3 °C)-98.2 °F (36.8 °C)] 97.4 °F (36.3 °C)  Pulse:  [] 77  Resp:  [18-20] 18  SpO2:  [91 %-98 %] 93 %  BP: (119-154)/(58-77) 119/58     Weight: 76.8 kg (169 lb 5 oz)  Body mass index is 28.18 " kg/m².    Physical Exam   Neurological: She is disoriented.   Skin:   Areas Examined (abnormalities noted in diagram):   Scalp / Hair Palpated and Inspected  Head / Face Inspection Performed  Neck Inspection Performed  Chest / Axilla Inspection Performed  Abdomen Inspection Performed  Genitals / Buttocks / Groin Inspection Performed  Back Inspection Performed  RUE Inspected  LUE Inspection Performed  RLE Inspected  LLE Inspection Performed  Nails and Digits Inspection Performed  Gland Inspection Performed    Significant Labs: All pertinent labs within the past 24 hours have been reviewed.    Significant Imaging: I have reviewed all pertinent imaging results/findings within the past 24 hours.

## 2023-01-11 NOTE — SUBJECTIVE & OBJECTIVE
Interval History:   Overnight patient reportedly hitting staff. VSSAF on exam this morning. Psychiatry consulted yesterday, awaiting recommendations for agitation and aggression. Currently continuing oral zyprexa. Dermatology consulted regarding worsening rash on patients upper lower extremities and abdomen. Will follow for Derm recs. Continuing RLE dressing changes BID, dressing clean, dry and intact. Patient refusing CBC this am. CMP showing stable Cr, no emergent abnormalities.     Review of Systems   Unable to perform ROS: Mental status change   Objective:     Vital Signs (Most Recent):  Temp: 97.8 °F (36.6 °C) (01/11/23 0834)  Pulse: 77 (01/11/23 0834)  Resp: 20 (01/11/23 0834)  BP: 139/65 (01/11/23 0834)  SpO2: 97 % (01/11/23 0834) Vital Signs (24h Range):  Temp:  [97.8 °F (36.6 °C)-98.2 °F (36.8 °C)] 97.8 °F (36.6 °C)  Pulse:  [] 77  Resp:  [18-20] 20  SpO2:  [91 %-98 %] 97 %  BP: (122-154)/(60-77) 139/65     Weight: 76.8 kg (169 lb 5 oz)  Body mass index is 28.18 kg/m².    Intake/Output Summary (Last 24 hours) at 1/11/2023 1040  Last data filed at 1/11/2023 0745  Gross per 24 hour   Intake 100 ml   Output --   Net 100 ml      Physical Exam  Vitals and nursing note reviewed.   Constitutional:       General: She is not in acute distress.     Appearance: She is well-developed.   HENT:      Head: Normocephalic and atraumatic.      Mouth/Throat:      Mouth: Mucous membranes are dry.      Pharynx: No oropharyngeal exudate.   Eyes:      Extraocular Movements: Extraocular movements intact.      Conjunctiva/sclera: Conjunctivae normal.      Pupils: Pupils are equal, round, and reactive to light.   Cardiovascular:      Rate and Rhythm: Normal rate and regular rhythm.      Heart sounds: Normal heart sounds.   Pulmonary:      Effort: Pulmonary effort is normal. No respiratory distress.      Breath sounds: Normal breath sounds. No wheezing or rales.   Abdominal:      General: Bowel sounds are normal. There is no  distension.      Palpations: Abdomen is soft.      Tenderness: There is no abdominal tenderness.      Comments: Erythematous irregular patch to low abdomen and mons pubis with associated excoriations.   Musculoskeletal:         General: Signs of injury present. No swelling or tenderness. Normal range of motion.      Cervical back: Normal range of motion and neck supple.   Lymphadenopathy:      Cervical: No cervical adenopathy.   Skin:     General: Skin is warm and dry.      Capillary Refill: Capillary refill takes less than 2 seconds.      Findings: Erythema and rash (upper lower extremities and abdomen) present.      Comments: RLE dressing clean, dry, and intact.    Neurological:      Mental Status: She is alert and oriented to person, place, and time.      Cranial Nerves: No cranial nerve deficit.      Sensory: No sensory deficit.      Coordination: Coordination normal.   Psychiatric:         Behavior: Behavior normal.         Thought Content: Thought content normal.         Judgment: Judgment normal.       Significant Labs: All pertinent labs within the past 24 hours have been reviewed.  CMP:   Recent Labs   Lab 01/10/23  0321 01/10/23  0905 01/11/23  0622     139 141 144   K 4.0  4.0  4.0 3.6  3.6 3.5     106 108 113*   CO2 20*  20* 21* 20*   *  152* 104 139*   BUN 53*  53* 47* 46*   CREATININE 1.4  1.4 1.2 1.1   CALCIUM 9.2  9.2 9.4 9.0   PROT 6.2  --  5.9*   ALBUMIN 2.7*  --  2.6*   BILITOT 0.5  --  0.4   ALKPHOS 117  --  113   AST 15  --  21   ALT 13  --  13   ANIONGAP 13  13 12 11       Significant Imaging: I have reviewed all pertinent imaging results/findings within the past 24 hours.

## 2023-01-11 NOTE — ASSESSMENT & PLAN NOTE
Jenniffer is a 89-year-old female who is currently admitted to the hospital for treatment of worsening cellulitis. She had recently been treated with PO ciprofloxacin as an outpatient but developed a reaction on day 7/10. On admission to the hospital, she was again started on ciprofloxacin. Her eruption returned and worsened, which prompted dermatology consultation. Given her history and clinical examination, her exanthem is most consistent with generalized drug eruption. Low concern for DIHS at this time with no fever, evidence of internal organ involvement, eosinophilia, centro-facial edema, or lymphadenopathy on examination.     - Recommend stopping ciprofloxacin at this time. We agree with gram negative coverage, given the appearance of the lower extremity wounds, and recommend an alterative agent for added coverage.  - Agree with wound culture to evaluate for appropriate antibiotic coverage of presumed infection. Order appears to be already placed, pending collection.   - Most likely that ciprofloxacin is the inciting drug in this case, but recommend in depth review of medication changes over the last two months, as a number of medications can cause drug eruption.   - No concerning feature to prompt skin biopsy at this time.  - Recommend starting triamcinolone 0.1% ointment to the entire body BID for 2 weeks.

## 2023-01-11 NOTE — PT/OT/SLP PROGRESS
Occupational Therapy      Patient Name:  Jenniffer Jimenez   MRN:  [id number]    Patient not seen today secondary to RN and PA hold 2/2 pt easily agitated and aggressive . Will follow-up as appropriate.    1/11/2023

## 2023-01-11 NOTE — PLAN OF CARE
01/11/23 1249   Post-Acute Status   Post-Acute Authorization Placement   Post-Acute Placement Status Pending medical clearance/testing   Discharge Delays   (Medically not ready)   Discharge Plan   Discharge Plan A Skilled Nursing Facility

## 2023-01-11 NOTE — CONSULTS
[facility]  Dermatology  Consult Note    Patient Name: Jenniffer Jimenez  MRN: [id number]  Admission Date: 1/4/2023  Hospital Length of Stay: 2 days  Attending Physician: Nely Chahal MD  Primary Care Provider: Rubi Young DO     Inpatient consult to Dermatology  Consult performed by: Bijal Aparicio MD  Consult ordered by: JUSTINO QuirozC  Reason for consult: evaluation of drug rash  Assessment/Recommendations: Agree with concern for drug eruption at this time. Please see recommendations as below.        Subjective:     Principal Problem:Cellulitis of right lower extremity    HPI:  Jenniffer is a 89-year-old female with a history of of HLD, HTN, AF s/p watchman, and chronic cellulitis/ wound of her right lower extremity who presented to the Emergency Department on 1/4/23 for evaluation of worsening redness and pain to her right lower extremity. Patient receives wound care for chronic RLE cellulitis, and wound care nuse noted worsening redness, weeping, and pain to her RLE. Per history, patient was recently started on ciprofloxacin out patient, but she developed a pruritic eruption on day 7/10 of her course and stopped the medication. In the ED, patient afebrile without leukocytosis but given examination findings, she was admitted for management of worsening RLE cellulitis and started IV vancomycin and ciprofloxacin. Since then, vancomycin has been discontinued and patient changed to doxycycline in addition to  continued ciprofloxacin. Since admission, she reports worsening pruritic, erythematous eruption to the abdomen and right leg. She has also developed increasing delirium, psychiatry now onboard. Dermatology consulted 1/11 for evaluation of possible drug eruption.            Past Medical History:   Diagnosis Date    Amblyopia of left eye 4/10/2013    Anxiety     Arthritis     Facet arthropathy, Lumbosacral    Cataract     Central retinal vein occlusion of left eye     Depression      Diabetic polyneuropathy 2022    Exotropia of both eyes 2013    recession RSR 5.0mm w/ adj; recession LR os 5.0 w/ adj; resect MR os  4.0mm    Hearing loss     History of resection of small bowel     Hypertensive retinopathy of both eyes     Hypoglycemia     Macular degeneration     OA (osteoarthritis) of shoulder     Right    Osteoporosis     Posterior vitreous detachment of both eyes     Rhinitis     TIA (transient ischemic attack)        Past Surgical History:   Procedure Laterality Date    APPENDECTOMY      CARDIAC CATHETERIZATION      CATARACT EXTRACTION W/  INTRAOCULAR LENS IMPLANT Bilateral      SECTION, CLASSIC      CLOSURE OF LEFT ATRIAL APPENDAGE USING DEVICE N/A 2020    Procedure: Left atrial appendage closure device;  Surgeon: Abundio Curtis MD;  Location: [facility] CATH LAB;  Service: Cardiology;  Laterality: N/A;    HYSTERECTOMY      INNER EAR SURGERY      JOINT REPLACEMENT      LEFT KNEE REPLACEMENT IN  -    OOPHORECTOMY      SINUS SURGERY      STRABISMUS SURGERY  13    RSR recession 5 mm, LLR recession 5 mm and LMR resection 4mm    STRABISMUS SURGERY  2014    recess LR OD 6mm    TONSILLECTOMY      watchman surgery N/A 2020     Family History       Problem Relation (Age of Onset)    Breast cancer Maternal Aunt    Diabetes Sister, Brother    Heart disease Sister, Sister    Hypertension Mother, Father    Liver disease Sister    No Known Problems Maternal Uncle, Paternal Aunt, Paternal Uncle, Maternal Grandmother, Maternal Grandfather, Paternal Grandmother, Paternal Grandfather, Daughter, Son, Son, Son          Tobacco Use    Smoking status: Former     Types: Cigarettes     Quit date: 10/29/1982     Years since quittin.2     Passive exposure: Never    Smokeless tobacco: Never   Substance and Sexual Activity    Alcohol use: Yes     Alcohol/week: 2.0 standard drinks     Types: 2 Shots of liquor per week     Comment: rare    Drug use: No  "   Sexual activity: Never       Review of patient's allergies indicates:   Allergen Reactions    Opioids - morphine analogues Other (See Comments)     Bowel issues; bowel obstruction    Tizanidine Other (See Comments)     "Lips were numb,  Almost passed out."    Tramadol Hallucinations    Beta-blockers (beta-adrenergic blocking agts) Other (See Comments)     Can not go on beta blockers for long period of time - due to taking allergy injections    Morphine     Opioids-meperidine and related     Ciprofloxacin Rash       Medications:  Continuous Infusions:  Scheduled Meds:   acetaminophen  1,000 mg Oral Q6H    aspirin  81 mg Oral Daily    diclofenac sodium  2 g Topical (Top) BID    doxycycline  100 mg Oral BID    enoxaparin  30 mg Subcutaneous Daily    famotidine  20 mg Oral Daily    furosemide  20 mg Oral BID    miconazole   Topical (Top) BID    OLANZapine  2.5 mg Oral Daily    polyethylene glycol  17 g Oral BID    pravastatin  40 mg Oral Daily    senna  8.6 mg Oral Daily    silver sulfADIAZINE 1%   Topical (Top) BID    spironolactone  25 mg Oral BID     PRN Meds:acetaminophen, albuterol-ipratropium, bisacodyL, dextrose 10%, dextrose 10%, glucagon (human recombinant), glucose, glucose, LIDOcaine HCl 2%, melatonin, ondansetron, polyethylene glycol    Review of Systems   Unable to perform ROS  Objective:     Vital Signs (Most Recent):  Temp: 97.4 °F (36.3 °C) (01/11/23 1137)  Pulse: 77 (01/11/23 1137)  Resp: 18 (01/11/23 1137)  BP: (!) 119/58 (01/11/23 1137)  SpO2: (!) 93 % (01/11/23 1137)   Vital Signs (24h Range):  Temp:  [97.4 °F (36.3 °C)-98.2 °F (36.8 °C)] 97.4 °F (36.3 °C)  Pulse:  [] 77  Resp:  [18-20] 18  SpO2:  [91 %-98 %] 93 %  BP: (119-154)/(58-77) 119/58     Weight: 76.8 kg (169 lb 5 oz)  Body mass index is 28.18 kg/m².    Physical Exam   Neurological: She is disoriented, lethargic, and unable to follow commands.   Skin:   Areas Examined (abnormalities noted in diagram):   Scalp / " Hair Palpated and Inspected. No concerning lesions.   Head / Face Inspection Performed. No concerning lesions.   Neck Inspection Performed. No concerning lesions.   Chest / Axilla Inspection Performed. Scattered erythematous macules and papules. Few with overlying scale.   Abdomen Inspection Performed. Scattered erythematous macules and papules, few coalescing into plaques. Thickened erythematous plaque to the central abdomen/pannus  Genitals / Buttocks / Groin: Unable to inspect.  Back Unable to assess.   RUE Inspected: Few erythematous macules and papules. Purpura present.  LUE Inspection Performed. Few erythematous macules and papules. Purpura present.  RLE Inspected. Scattered erythematous macules and papules, few coalescing into plaques. Distal lower extremity wrapped.   LLE Inspection Performed Scattered erythematous macules and papules, few coalescing into plaques. Distal lower extremity wrapped.   Nails and Digits Inspection Performed. No concerning lesions.   Gland Inspection Performed. No cervical or axillary lymphadenopathy observed.               Significant Labs: All pertinent labs within the past 24 hours have been reviewed.    Significant Imaging: I have reviewed all pertinent imaging results/findings within the past 24 hours.      Assessment/Plan:     Drug eruption  Jenniffer is a 89-year-old female who is currently admitted to the hospital for treatment of worsening cellulitis. She had recently been treated with PO ciprofloxacin as an outpatient but developed a reaction on day 7/10. On admission to the hospital, she was again started on ciprofloxacin. Her eruption returned and worsened, which prompted dermatology consultation. Given her history and clinical examination, her exanthem is most consistent with generalized drug eruption. Low concern for DIHS at this time with no fever, evidence of internal organ involvement, eosinophilia, centro-facial edema, or lymphadenopathy on examination.     - Recommend  stopping ciprofloxacin at this time. We agree with gram negative coverage, given the appearance of the lower extremity wounds, and recommend an alterative agent for added coverage.  - Agree with wound culture to evaluate for appropriate antibiotic coverage of presumed infection. Order appears to be already placed, pending collection.   - Most likely that ciprofloxacin is the inciting drug in this case, but recommend in depth review of medication changes over the last two months, as a number of medications can cause drug eruption.   - No concerning feature to prompt skin biopsy at this time.  - Recommend starting triamcinolone 0.1% ointment to the entire body BID for 2 weeks.     Thank you for your consult.    Bijal Aparicio MD  Dermatology PGY-II  [facility]
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
## 2023-01-11 NOTE — ASSESSMENT & PLAN NOTE
Stage 3b chronic kidney disease  - overnight 01/05 K 6.6 - given calcium gluconate, albuterol, and lokelma  - scheduled lokelma discontinued, potassium remaining normalized  - monitor on tele

## 2023-01-11 NOTE — PLAN OF CARE
Problem: Adult Inpatient Plan of Care  Goal: Plan of Care Review  Outcome: Ongoing, Progressing  Goal: Patient-Specific Goal (Individualized)  Outcome: Ongoing, Progressing  Goal: Absence of Hospital-Acquired Illness or Injury  Outcome: Ongoing, Progressing  Goal: Optimal Comfort and Wellbeing  Outcome: Ongoing, Progressing  Goal: Readiness for Transition of Care  Outcome: Ongoing, Progressing     Problem: Infection  Goal: Absence of Infection Signs and Symptoms  Outcome: Ongoing, Progressing     Problem: Diabetes Comorbidity  Goal: Blood Glucose Level Within Targeted Range  Outcome: Ongoing, Progressing     Problem: Fluid and Electrolyte Imbalance (Acute Kidney Injury/Impairment)  Goal: Fluid and Electrolyte Balance  Outcome: Ongoing, Progressing     Problem: Oral Intake Inadequate (Acute Kidney Injury/Impairment)  Goal: Optimal Nutrition Intake  Outcome: Ongoing, Progressing     Problem: Renal Function Impairment (Acute Kidney Injury/Impairment)  Goal: Effective Renal Function  Outcome: Ongoing, Progressing     Problem: Impaired Wound Healing  Goal: Optimal Wound Healing  Outcome: Ongoing, Progressing     Problem: Fall Injury Risk  Goal: Absence of Fall and Fall-Related Injury  Outcome: Ongoing, Progressing     Problem: Skin Injury Risk Increased  Goal: Skin Health and Integrity  Outcome: Ongoing, Progressing     Problem: Restraint, Nonbehavioral (Nonviolent)  Goal: Absence of Harm or Injury  Outcome: Ongoing, Progressing

## 2023-01-11 NOTE — PT/OT/SLP PROGRESS
Physical Therapy      Patient Name:  Jenniffer Jimenez   MRN:  [id number]    Patient not seen today secondary to: RN and PA hold due to pt becoming easily agitated once woken up; pt currently sleeping. Will follow-up when medically appropriate.    1/11/2023

## 2023-01-11 NOTE — HPI
Jenniffer is a 89-year-old female with a history of of HLD, HTN, AF s/p watchman, and chronic cellulitis/ wound of her right lower extremity who presented to the Emergency Department on 1/4/23 for evaluation of worsening redness and pain to her right lower extremity. Patient receives wound care for chronic RLE cellulitis, and wound care nuse noted worsening redness, weeping, and pain to her RLE. Per history, patient was recently started on ciprofloxacin out patient, but she developed a pruritic eruption on day 7/10 of her course and stopped the medication. In the ED, patient afebrile without leukocytosis but given examination findings, she was admitted for management of worsening RLE cellulitis and started IV vancomycin and ciprofloxacin. Since then, vancomycin has been discontinued and patient changed to doxycycline in addition to  continued ciprofloxacin. Since admission, she reports worsening pruritic, erythematous eruption to the abdomen and right leg. She has also developed increasing delirium, psychiatry now onboard. Dermatology consulted 1/11 for evaluation of possible drug eruption.

## 2023-01-12 PROBLEM — E87.29 HIGH ANION GAP METABOLIC ACIDOSIS: Status: ACTIVE | Noted: 2023-01-12

## 2023-01-12 LAB
ALBUMIN SERPL BCP-MCNC: 2.5 G/DL (ref 3.5–5.2)
ALBUMIN SERPL BCP-MCNC: 2.7 G/DL (ref 3.5–5.2)
ALP SERPL-CCNC: 110 U/L (ref 55–135)
ALP SERPL-CCNC: 121 U/L (ref 55–135)
ALT SERPL W/O P-5'-P-CCNC: 13 U/L (ref 10–44)
ALT SERPL W/O P-5'-P-CCNC: 15 U/L (ref 10–44)
ANION GAP SERPL CALC-SCNC: 10 MMOL/L (ref 8–16)
ANION GAP SERPL CALC-SCNC: 18 MMOL/L (ref 8–16)
AST SERPL-CCNC: 19 U/L (ref 10–40)
AST SERPL-CCNC: 20 U/L (ref 10–40)
BASOPHILS # BLD AUTO: 0.02 K/UL (ref 0–0.2)
BASOPHILS NFR BLD: 0.5 % (ref 0–1.9)
BILIRUB SERPL-MCNC: 0.4 MG/DL (ref 0.1–1)
BILIRUB SERPL-MCNC: 0.4 MG/DL (ref 0.1–1)
BUN SERPL-MCNC: 45 MG/DL (ref 8–23)
BUN SERPL-MCNC: 48 MG/DL (ref 8–23)
CALCIUM SERPL-MCNC: 8.7 MG/DL (ref 8.7–10.5)
CALCIUM SERPL-MCNC: 8.8 MG/DL (ref 8.7–10.5)
CHLORIDE SERPL-SCNC: 111 MMOL/L (ref 95–110)
CHLORIDE SERPL-SCNC: 113 MMOL/L (ref 95–110)
CO2 SERPL-SCNC: 15 MMOL/L (ref 23–29)
CO2 SERPL-SCNC: 21 MMOL/L (ref 23–29)
CREAT SERPL-MCNC: 1.1 MG/DL (ref 0.5–1.4)
CREAT SERPL-MCNC: 1.2 MG/DL (ref 0.5–1.4)
DIFFERENTIAL METHOD: ABNORMAL
EOSINOPHIL # BLD AUTO: 0.1 K/UL (ref 0–0.5)
EOSINOPHIL NFR BLD: 2.6 % (ref 0–8)
ERYTHROCYTE [DISTWIDTH] IN BLOOD BY AUTOMATED COUNT: 16.1 % (ref 11.5–14.5)
EST. GFR  (NO RACE VARIABLE): 43 ML/MIN/1.73 M^2
EST. GFR  (NO RACE VARIABLE): 47.7 ML/MIN/1.73 M^2
GLUCOSE SERPL-MCNC: 106 MG/DL (ref 70–110)
GLUCOSE SERPL-MCNC: 128 MG/DL (ref 70–110)
HCT VFR BLD AUTO: 25.7 % (ref 37–48.5)
HGB BLD-MCNC: 8.6 G/DL (ref 12–16)
IMM GRANULOCYTES # BLD AUTO: 0.02 K/UL (ref 0–0.04)
IMM GRANULOCYTES NFR BLD AUTO: 0.5 % (ref 0–0.5)
LACTATE SERPL-SCNC: 3 MMOL/L (ref 0.5–2.2)
LYMPHOCYTES # BLD AUTO: 0.6 K/UL (ref 1–4.8)
LYMPHOCYTES NFR BLD: 15.7 % (ref 18–48)
MCH RBC QN AUTO: 28.7 PG (ref 27–31)
MCHC RBC AUTO-ENTMCNC: 33.5 G/DL (ref 32–36)
MCV RBC AUTO: 86 FL (ref 82–98)
MONOCYTES # BLD AUTO: 0.5 K/UL (ref 0.3–1)
MONOCYTES NFR BLD: 12.3 % (ref 4–15)
NEUTROPHILS # BLD AUTO: 2.7 K/UL (ref 1.8–7.7)
NEUTROPHILS NFR BLD: 68.4 % (ref 38–73)
NRBC BLD-RTO: 0 /100 WBC
PLATELET # BLD AUTO: 235 K/UL (ref 150–450)
PMV BLD AUTO: 10.2 FL (ref 9.2–12.9)
POCT GLUCOSE: 164 MG/DL (ref 70–110)
POTASSIUM SERPL-SCNC: 3.3 MMOL/L (ref 3.5–5.1)
POTASSIUM SERPL-SCNC: 3.8 MMOL/L (ref 3.5–5.1)
PROT SERPL-MCNC: 5.8 G/DL (ref 6–8.4)
PROT SERPL-MCNC: 6.2 G/DL (ref 6–8.4)
RBC # BLD AUTO: 3 M/UL (ref 4–5.4)
SODIUM SERPL-SCNC: 142 MMOL/L (ref 136–145)
SODIUM SERPL-SCNC: 146 MMOL/L (ref 136–145)
WBC # BLD AUTO: 3.89 K/UL (ref 3.9–12.7)

## 2023-01-12 PROCEDURE — 25000003 PHARM REV CODE 250: Mod: HCNC

## 2023-01-12 PROCEDURE — 99232 PR SUBSEQUENT HOSPITAL CARE,LEVL II: ICD-10-PCS | Mod: ,,,

## 2023-01-12 PROCEDURE — 99232 SBSQ HOSP IP/OBS MODERATE 35: CPT | Mod: ,,,

## 2023-01-12 PROCEDURE — 97535 SELF CARE MNGMENT TRAINING: CPT | Mod: HCNC

## 2023-01-12 PROCEDURE — 25000003 PHARM REV CODE 250: Mod: HCNC | Performed by: PHYSICIAN ASSISTANT

## 2023-01-12 PROCEDURE — 85025 COMPLETE CBC W/AUTO DIFF WBC: CPT | Mod: HCNC | Performed by: HOSPITALIST

## 2023-01-12 PROCEDURE — 11000001 HC ACUTE MED/SURG PRIVATE ROOM: Mod: HCNC

## 2023-01-12 PROCEDURE — 80053 COMPREHEN METABOLIC PANEL: CPT | Mod: 91,HCNC

## 2023-01-12 PROCEDURE — 97530 THERAPEUTIC ACTIVITIES: CPT | Mod: HCNC

## 2023-01-12 PROCEDURE — 63600175 PHARM REV CODE 636 W HCPCS: Mod: HCNC | Performed by: PHYSICIAN ASSISTANT

## 2023-01-12 PROCEDURE — 63600175 PHARM REV CODE 636 W HCPCS: Mod: HCNC

## 2023-01-12 PROCEDURE — 80053 COMPREHEN METABOLIC PANEL: CPT | Mod: HCNC

## 2023-01-12 PROCEDURE — 36415 COLL VENOUS BLD VENIPUNCTURE: CPT | Mod: HCNC

## 2023-01-12 PROCEDURE — 83605 ASSAY OF LACTIC ACID: CPT | Mod: HCNC

## 2023-01-12 PROCEDURE — 25000003 PHARM REV CODE 250: Mod: HCNC | Performed by: HOSPITALIST

## 2023-01-12 PROCEDURE — 99233 SBSQ HOSP IP/OBS HIGH 50: CPT | Mod: HCNC,,,

## 2023-01-12 PROCEDURE — 99233 PR SUBSEQUENT HOSPITAL CARE,LEVL III: ICD-10-PCS | Mod: HCNC,,,

## 2023-01-12 RX ORDER — POTASSIUM CHLORIDE 20 MEQ/1
40 TABLET, EXTENDED RELEASE ORAL ONCE
Status: COMPLETED | OUTPATIENT
Start: 2023-01-12 | End: 2023-01-12

## 2023-01-12 RX ORDER — GUAIFENESIN 100 MG/5ML
200 SOLUTION ORAL EVERY 4 HOURS PRN
Status: DISCONTINUED | OUTPATIENT
Start: 2023-01-12 | End: 2023-01-24 | Stop reason: HOSPADM

## 2023-01-12 RX ORDER — OLANZAPINE 2.5 MG/1
2.5 TABLET ORAL NIGHTLY
Status: DISCONTINUED | OUTPATIENT
Start: 2023-01-12 | End: 2023-01-24 | Stop reason: HOSPADM

## 2023-01-12 RX ADMIN — DICLOFENAC SODIUM 2 G: 10 GEL TOPICAL at 09:01

## 2023-01-12 RX ADMIN — SPIRONOLACTONE 25 MG: 25 TABLET, FILM COATED ORAL at 08:01

## 2023-01-12 RX ADMIN — SILVER SULFADIAZINE: 10 CREAM TOPICAL at 08:01

## 2023-01-12 RX ADMIN — SENNOSIDES 8.6 MG: 8.6 TABLET, FILM COATED ORAL at 08:01

## 2023-01-12 RX ADMIN — SODIUM CHLORIDE, POTASSIUM CHLORIDE, SODIUM LACTATE AND CALCIUM CHLORIDE 500 ML: 600; 310; 30; 20 INJECTION, SOLUTION INTRAVENOUS at 11:01

## 2023-01-12 RX ADMIN — FUROSEMIDE 20 MG: 20 TABLET ORAL at 06:01

## 2023-01-12 RX ADMIN — AMOXICILLIN AND CLAVULANATE POTASSIUM 1 TABLET: 875; 125 TABLET, FILM COATED ORAL at 08:01

## 2023-01-12 RX ADMIN — SILVER SULFADIAZINE: 10 CREAM TOPICAL at 09:01

## 2023-01-12 RX ADMIN — TRIAMCINOLONE ACETONIDE: 1 OINTMENT TOPICAL at 09:01

## 2023-01-12 RX ADMIN — ACETAMINOPHEN 1000 MG: 500 TABLET ORAL at 12:01

## 2023-01-12 RX ADMIN — DOXYCYCLINE HYCLATE 100 MG: 100 TABLET, COATED ORAL at 09:01

## 2023-01-12 RX ADMIN — AMOXICILLIN AND CLAVULANATE POTASSIUM 1 TABLET: 875; 125 TABLET, FILM COATED ORAL at 09:01

## 2023-01-12 RX ADMIN — POLYETHYLENE GLYCOL 3350 17 G: 17 POWDER, FOR SOLUTION ORAL at 08:01

## 2023-01-12 RX ADMIN — ASPIRIN 81 MG: 81 TABLET, COATED ORAL at 08:01

## 2023-01-12 RX ADMIN — FUROSEMIDE 20 MG: 20 TABLET ORAL at 08:01

## 2023-01-12 RX ADMIN — DICLOFENAC SODIUM 2 G: 10 GEL TOPICAL at 08:01

## 2023-01-12 RX ADMIN — OLANZAPINE 2.5 MG: 2.5 TABLET, FILM COATED ORAL at 09:01

## 2023-01-12 RX ADMIN — MICONAZOLE NITRATE: 20 CREAM TOPICAL at 09:01

## 2023-01-12 RX ADMIN — SPIRONOLACTONE 25 MG: 25 TABLET, FILM COATED ORAL at 09:01

## 2023-01-12 RX ADMIN — POTASSIUM CHLORIDE 40 MEQ: 1500 TABLET, EXTENDED RELEASE ORAL at 08:01

## 2023-01-12 RX ADMIN — ENOXAPARIN SODIUM 30 MG: 30 INJECTION SUBCUTANEOUS at 06:01

## 2023-01-12 RX ADMIN — MICONAZOLE NITRATE: 20 CREAM TOPICAL at 08:01

## 2023-01-12 RX ADMIN — DOXYCYCLINE HYCLATE 100 MG: 100 TABLET, COATED ORAL at 08:01

## 2023-01-12 RX ADMIN — PRAVASTATIN SODIUM 40 MG: 40 TABLET ORAL at 08:01

## 2023-01-12 NOTE — PT/OT/SLP PROGRESS
Occupational Therapy   Treatment    Name: Jenniffer Jimenez  MRN: [id number]  Admitting Diagnosis:  Cellulitis of right lower extremity       Recommendations:     Discharge Recommendations: nursing facility, skilled  Discharge Equipment Recommendations:  shower chair  Barriers to discharge:  Inaccessible home environment, Decreased caregiver support    Assessment:     Jenniffer Jimenez is a 89 y.o. female with a medical diagnosis of Cellulitis of right lower extremity.  She presents with impaired ADL and mobility performance deficits. Pt found upright in bed and agreeable for therapy once arousable. Pt lethargic upon entry with RN and sitter present. Session focused on EOB ADLs and short distance gait at bedside using rollator. Pt required max A for bed mobility, tolerated EOB ~12 minutes, and stood with mod A using rollator. Pt continues to display poor safety awareness, lateral sway, and wide ROSETTA. Pt would benefit from continued OT skilled services 4x/wk to improve daily living skills to optimize QOL.  Pt is recommended to discharge to SNF at this time.   Performance deficits affecting function are weakness, impaired functional mobility, impaired endurance, gait instability, impaired balance, impaired self care skills, decreased safety awareness, impaired cognition.     Rehab Prognosis:  Good; patient would benefit from acute skilled OT services to address these deficits and reach maximum level of function.       Plan:     Patient to be seen 4 x/week to address the above listed problems via self-care/home management, therapeutic activities, therapeutic exercises  Plan of Care Expires: 02/09/23  Plan of Care Reviewed with: patient    Subjective     Pain/Comfort:  Pain Rating 1: other (see comments) (R leg)  Location - Side 1: Right  Location - Orientation 1: generalized  Location 1: leg    Objective:     Communicated with: RN prior to session.  Patient found HOB elevated with telemetry, peripheral IV upon OT  entry to room.    General Precautions: Standard, fall    Orthopedic Precautions:N/A  Braces: N/A  Respiratory Status: Room air     Occupational Performance:     Bed Mobility:    Patient completed Rolling/Turning to Right with maximal assistance  Patient completed Scooting/Bridging with maximal assistance  Patient completed Supine to Sit with maximal assistance  Patient completed Sit to Supine with maximal assistance     Functional Mobility/Transfers:  Patient completed Sit <> Stand Transfer with moderate assistance  with  rollator    Functional Mobility: Pt stood and took ~4 lateral steps to HOB with mod A using rollator. Pt voiced fear of falling and limited foot clearance (RLE>LLE)    Activities of Daily Living:  Feeding:  setup A at EOB with SBA for safety   Lower Body Dressing: total assistance donning socks       [facility] 6 Click ADL: 14    Treatment & Education:  Pt educated on role of occupational therapy, POC, and safety during ADLs and functional mobility. Pt and OT discussed importance of safe, continued mobility to optimize daily living skills. Pt verbalized understanding.   White board updated during session. Pt given instruction to call for medical staff/nurse for assistance.       Patient left HOB elevated with all lines intact, call button in reach, bed alarm on, RN notified, and PCT and RN present    GOALS:   Multidisciplinary Problems       Occupational Therapy Goals          Problem: Occupational Therapy    Goal Priority Disciplines Outcome Interventions   Occupational Therapy Goal     OT, PT/OT Ongoing, Progressing    Description: Goals to be met by: 1/16/2023 (1 week)     Patient will increase functional independence with ADLs by performing:    UE Dressing with Supervision.  LE Dressing with Supervision.  Grooming while standing at sink with Stand-by Assistance.  Toileting from toilet with Stand-by Assistance for hygiene and clothing management.   Rolling to Bilateral with [place].   Supine to  sit with [place].  Step transfer with Stand-by Assistance  Toilet transfer to toilet with Stand-by Assistance.                         Time Tracking:     OT Date of Treatment: 01/12/23  OT Start Time: 0816  OT Stop Time: 0841  OT Total Time (min): 25 min    Billable Minutes:Self Care/Home Management 15 min  Therapeutic Activity 10 min    OT/ROMERO: OT          1/12/2023

## 2023-01-12 NOTE — PROGRESS NOTES
"CONSULTATION LIAISON PSYCHIATRY PROGRESS NOTE    Patient Name: Jenniffer Jimenez  MRN: [id number]  Patient Class: IP- Inpatient  Admission Date: 1/4/2023  Attending Physician: Nely Ruiz MD      SUBJECTIVE:   Jenniffer Jimenez is a 89 y.o. female with past psychiatric history of anxiety and depression & past pertinent medical history of  HLD, HTN, AF s/p watchman, chronic cellulitis/ wound of her right lower extremity who presents to [facility] for evaluation of worsening redness and pain to her right lower extremity presents to the ED/ admitted to the hospital for Cellulitis of right lower extremity.     Psychiatry consulted for new onset delirium, agitation and non redirectable behavior    Patient seen and chart reviewed. NAEO. Today, pt awake, lying in bed. States she is feeling great and "being treated like a teresa" here. She is oriented to self, situation, time/date. She is disoriented to place stating she is in her "apartment at the [facility]." However, later during interview she states wanting the "[facility] doctors to assess her hearing while she's here." There was definitely a barrier to communication due to her not having her hearing aid, but she was able to participate fully in interview. She is calm and cooperative and answers all questions correctly. Per sitter at bedside, she did not have any combative or aggressive events over night and she has not been attempting to get out of bed on her own.     Pt reports her appetite is good stating "I even ate breakfast, I normally don't eat breakfast because I am trying to watch my weight." She also reports sleeping well. Denies SI/HI/AVH.        OBJECTIVE:    Mental Status Exam:  Appearance: unremarkable, age appropriate  Behavior/Cooperation: normal, cooperative  Speech: normal tone, normal rate, normal pitch, normal volume  Mood: "excellent"  Affect: euphoric and congruent with mood   Thought Process: normal and logical  Thought Content: normal, no " suicidality, no homicidality, delusions, or paranoia   Orientation: person, situation, time/date  Memory: Grossly intact and Registers and recalls 3/3 objects immediately and at 5 minutes  Attention Span/Concentration: Normal  Insight: fair  Judgment: fair    CAM ICU positive? no      ASSESSMENT & RECOMMENDATIONS     DELIRIUM  DELIRIUM BEHAVIOR MANAGEMENT  PLEASE utilize CHEMICAL restraints with PRN meds first for agitation. Minimize use of PHYSICAL restraints OR have periods of being out of physical restraints if possible.  Keep window shades open and room lit during day and room dim at night in order to promote normal sleep-wake cycles  Encourage family at bedside. Hiram patient often to situation, location, date.  Continue to Limit or Discontinue use of Narcotics, Benzos and Anti-cholinergic medications as they may worsen delirium.  Continue medical workup for causative etiology of Delirium.   Recommend changing nightly 5mg zyprexa PO to 2.5mg zyprexa PO nightly.  Continue zyprexa 2.5mg PO Q8HR PRN for agitation     RISK ASSESSMENT  NO NEED FOR PEC    FOLLOW UP  Will sign off pt can follow up with outpt MH provider resources provided in pt discharge instructions.    DISPOSITION- Once medically cleared;    Defer to medical team    Please contact ON CALL psychiatry service (24/7) for any acute issues that may arise.    Andrew Morse NP   Psychiatry  [facility]  1/12/2023 10:27 AM        --------------------------------------------------------------------------------------------------------------------------------------------------------------------------------------------------------------------------------------    CONTINUED OBJECTIVE clinical data & findings reviewed and noted for above decision making    Current Medications:   Scheduled Meds:    acetaminophen  1,000 mg Oral Q6H    amoxicillin-clavulanate 875-125mg  1 tablet Oral Q12H    aspirin  81 mg Oral Daily    diclofenac sodium  2 g  "Topical (Top) BID    doxycycline  100 mg Oral BID    enoxaparin  30 mg Subcutaneous Daily    furosemide  20 mg Oral BID    miconazole   Topical (Top) BID    OLANZapine  5 mg Oral QHS    polyethylene glycol  17 g Oral BID    pravastatin  40 mg Oral Daily    senna  8.6 mg Oral Daily    silver sulfADIAZINE 1%   Topical (Top) BID    spironolactone  25 mg Oral BID    triamcinolone acetonide 0.1%   Topical (Top) BID     PRN Meds: acetaminophen, albuterol-ipratropium, bisacodyL, dextrose 10%, dextrose 10%, glucagon (human recombinant), glucose, glucose, LIDOcaine HCl 2%, melatonin, OLANZapine, ondansetron, polyethylene glycol    Allergies:   Review of patient's allergies indicates:   Allergen Reactions    Opioids - morphine analogues Other (See Comments)     Bowel issues; bowel obstruction    Tizanidine Other (See Comments)     "Lips were numb,  Almost passed out."    Tramadol Hallucinations    Beta-blockers (beta-adrenergic blocking agts) Other (See Comments)     Can not go on beta blockers for long period of time - due to taking allergy injections    Morphine     Opioids-meperidine and related     Ciprofloxacin Rash       Vitals  Vitals:    01/12/23 0408   BP: (!) 123/56   Pulse: 69   Resp: 16   Temp: 97.6 °F (36.4 °C)       Labs/Imaging/Studies:  Recent Results (from the past 24 hour(s))   Urinalysis, Reflex to Urine Culture Urine, Catheterized    Collection Time: 01/11/23  4:00 PM    Specimen: Urine   Result Value Ref Range    Specimen UA Urine, Catheterized     Color, UA Yellow Yellow, Straw, Amira    Appearance, UA Clear Clear    pH, UA 5.0 5.0 - 8.0    Specific Gravity, UA 1.020 1.005 - 1.030    Protein, UA 1+ (A) Negative    Glucose, UA Negative Negative    Ketones, UA Negative Negative    Bilirubin (UA) Negative Negative    Occult Blood UA 1+ (A) Negative    Nitrite, UA Negative Negative    Leukocytes, UA Negative Negative   Urinalysis Microscopic    Collection Time: 01/11/23  4:00 PM   Result Value Ref Range    " RBC, UA 3 0 - 4 /hpf    WBC, UA 2 0 - 5 /hpf    Bacteria None None-Occ /hpf    Squam Epithel, UA 3 /hpf    Non-Squam Epith 5 (A) <1/hpf /hpf    Hyaline Casts, UA 0 0-1/lpf /lpf    Microscopic Comment SEE COMMENT    Comprehensive metabolic panel    Collection Time: 01/12/23  4:42 AM   Result Value Ref Range    Sodium 146 (H) 136 - 145 mmol/L    Potassium 3.3 (L) 3.5 - 5.1 mmol/L    Chloride 113 (H) 95 - 110 mmol/L    CO2 15 (L) 23 - 29 mmol/L    Glucose 106 70 - 110 mg/dL    BUN 48 (H) 8 - 23 mg/dL    Creatinine 1.2 0.5 - 1.4 mg/dL    Calcium 8.8 8.7 - 10.5 mg/dL    Total Protein 5.8 (L) 6.0 - 8.4 g/dL    Albumin 2.5 (L) 3.5 - 5.2 g/dL    Total Bilirubin 0.4 0.1 - 1.0 mg/dL    Alkaline Phosphatase 110 55 - 135 U/L    AST 19 10 - 40 U/L    ALT 13 10 - 44 U/L    Anion Gap 18 (H) 8 - 16 mmol/L    eGFR 43.0 (A) >60 mL/min/1.73 m^2   CBC auto differential    Collection Time: 01/12/23  4:42 AM   Result Value Ref Range    WBC 3.89 (L) 3.90 - 12.70 K/uL    RBC 3.00 (L) 4.00 - 5.40 M/uL    Hemoglobin 8.6 (L) 12.0 - 16.0 g/dL    Hematocrit 25.7 (L) 37.0 - 48.5 %    MCV 86 82 - 98 fL    MCH 28.7 27.0 - 31.0 pg    MCHC 33.5 32.0 - 36.0 g/dL    RDW 16.1 (H) 11.5 - 14.5 %    Platelets 235 150 - 450 K/uL    MPV 10.2 9.2 - 12.9 fL    Immature Granulocytes 0.5 0.0 - 0.5 %    Gran # (ANC) 2.7 1.8 - 7.7 K/uL    Immature Grans (Abs) 0.02 0.00 - 0.04 K/uL    Lymph # 0.6 (L) 1.0 - 4.8 K/uL    Mono # 0.5 0.3 - 1.0 K/uL    Eos # 0.1 0.0 - 0.5 K/uL    Baso # 0.02 0.00 - 0.20 K/uL    nRBC 0 0 /100 WBC    Gran % 68.4 38.0 - 73.0 %    Lymph % 15.7 (L) 18.0 - 48.0 %    Mono % 12.3 4.0 - 15.0 %    Eosinophil % 2.6 0.0 - 8.0 %    Basophil % 0.5 0.0 - 1.9 %    Differential Method Automated      Imaging Results              X-Ray Chest AP Portable (Final result)  Result time 01/05/23 01:39:27      Final result by Tye Ferris MD (01/05/23 01:39:27)                   Impression:      Cardiomegaly and coarsened interstitial lung markings.  No  "confluent area of consolidation or detrimental change when compared with 09/25/2022.      Electronically signed by: Tye Ferris MD  Date:    01/05/2023  Time:    01:39               Narrative:    EXAMINATION:  XR CHEST AP PORTABLE    CLINICAL HISTORY:  Provided history is "SOB;  ".    TECHNIQUE:  One view of the chest.    COMPARISON:  09/25/2022.    FINDINGS:  Cardiac wires overlie the chest.  Cardiomediastinal silhouette is mildly enlarged and similar to the prior study.  Atherosclerotic calcifications overlie the aortic arch.  Stable elevation of the left hemidiaphragm.  Coarsened bilateral interstitial lung markings but no confluent area of consolidation.  No large pleural effusion.  No distinct pneumothorax.  Postoperative changes in the left humerus.                                      "

## 2023-01-12 NOTE — PT/OT/SLP EVAL
Physical Therapy   Progress Note    Patient Name:  Jenniffer Jimenez  MRN: [id number]    Admit Date: 1/4/2023  Admitting Diagnosis:  Cellulitis of right lower extremity  Length of Stay: 3 days  Recent Surgery: * No surgery found *      Recommendations:     Discharge Recommendations: Skilled Nursing Facility   Equipment recommendations: shower chair  Barriers to discharge: Increased level of skilled assistance required and Fall risk     Assessment:     Jenniffer Jimenez is a 89 y.o. female admitted to [facility] on 1/4/2023 with medical diagnosis of Cellulitis of right lower extremity. Pt presents with weakness, impaired endurance, impaired self care skills, impaired functional mobility, gait instability, impaired balance, decreased coordination, decreased lower extremity function, pain, impaired skin. Pt is progressing towards goals, but has not yet reached prior level of function.   Pt is more alert and safer to participate with therapy today. Pt able to perform 2 sit > stands and trial lateral steps. Also able to complete BSC t/f. Pt requires MOD assist with t/f and oob mobility. Very unsteady on feet.  Jenniffer Jimenez would benefit from continued acute PT intervention to improve quality of life, focus on recovery of impairments, provide patient/caregiver education, reduce fall risk, and maximize (I) and safety with functional mobility. Once medically stable, recommending pt discharge to Skilled Nursing Facility .      Rehab Prognosis: Good    Plan:     During this hospitalization, patient to be seen 3 x/week to address the identified rehab impairments via gait training, therapeutic activities, therapeutic exercises, neuromuscular re-education and progress towards stated goals.     Plan of Care Expires:  02/08/23  Plan of Care reviewed with: patient and family    This plan of care has been discussed with the patient/caregiver, who was included in its development and is in agreement with the identified goals and  "treatment plan.     Subjective     Communicated with RN prior to session.  Patient found supine upon PT entry to room, agreeable to therapy session. Pt's daughter present during session.    Patient/Family Comments/goals: "Oh I am glad youre here today"    Pain/Comfort:  Pain Rating 1:  (pt did not rate)  Location - Side 1: Right  Location - Orientation 1: generalized  Location 1: leg  Pain Addressed 1: Reposition, Distraction, Cessation of Activity  Pain Rating Post-Intervention 1:  (pt did not rate)    Patients cultural, spiritual, Pentecostalism conflicts given the current situation: None identified     Objective:     Patient found with: peripheral IV    General Precautions: Standard, fall   Orthopedic Precautions:N/A   Braces: N/A   Oxygen Device: room air    Cognition:  Pt is Alert during session.    Therapist provided skilled verbal and tactile cueing to facilitate the following functional mobility tasks. Listed tasks are focused on recovery of impairments and improving pt's independence and efficiency with bed mobility, transfers and ambulation as able.     Bed Mobility:  Supine > Sit: Stand-by Assistance  Sit > Supine: Stand-by Assistance    Transfers:   Sit <> Stand Transfer: Minimal Assistance from eob with RW AD   Bed <> Chair: Moderate Assistance with RW AD                  Gait:  Distance: 6 lateral steps L + 6 lateral steps R  Assistance level: Moderate Assistance  Assistive Device: rolling walker  Gait Assessment: decreased step length , decreased gait speed, narrow base of support, and unsteady gait     Balance:  Dynamic Sitting: FAIR+: Maintains balance through MINIMAL excursions of active trunk motion  Standing:  Static: POOR: Needs MODERATE assist to maintain   Dynamic: POOR: Needs MOD (moderate) assist during gait    Outcome Measure: AM-PAC 6 CLICK MOBILITY  Total Score:14     Patient/Caregiver Education and Additional Therapeutic Activities/Exercises       Provided pt/caregiver education regarding: "   PT POC and goals for therapy   Safety with mobility and fall risk   Safe management of AD as needed   Energy conservation techniques   Instruction on use of call button and importance of calling nursing staff for assistance with mobility     Patient/caregiver able to verbalize understanding; will follow-up with pt/caregiver during current admit for additional questions/concerns within scope of practice.     White board updated.     Patient left supine with call button in reach, bed alarm on, and RN notified.    Goals:     Multidisciplinary Problems       Physical Therapy Goals          Problem: Physical Therapy    Goal Priority Disciplines Outcome Goal Variances Interventions   Physical Therapy Goal     PT, PT/OT Ongoing, Progressing     Description: Goals to be met by: 23     Patient will increase functional independence with mobility by performin. Sit to stand transfer with Stand-by Assistance  2. Gait  x 100 feet with Stand-by Assistance using LRAD.   3. Stand for 6 minutes with Stand-by Assistance using LRAD while performing dynamic tasks.                         Time Tracking:       PT Received On: 23  PT Start Time: 1515     PT Stop Time: 1545  PT Total Time (min): 30 min     Billable Minutes: Therapeutic Activity 30    2023

## 2023-01-12 NOTE — NURSING
Pt bladder scanned show 750ml, assistance to bedside commode voided 550ml of emanuel colored urine. Tolerated well. Post void bladder scan showed less than 220ml remaining.

## 2023-01-12 NOTE — PLAN OF CARE
01/12/23 1147   Discharge Reassessment   Assessment Type Discharge Planning Reassessment   Did the patient's condition or plan change since previous assessment? Yes  (Patient has returned to baseline and is alert and oriented)   Discharge Plan discussed with: Patient   Discharge Plan A Skilled Nursing Facility   Discharge Plan B Home Health   Discharge Barriers Identified Other (see comments)  (Was in need of a sitter.)   Why the patient remains in the hospital Requires continued medical care   Post-Acute Status   Post-Acute Authorization Placement   Post-Acute Placement Status Pending post-acute provider review/more information requested   Patient choice form signed by patient/caregiver List with quality metrics by geographic area provided   Discharge Delays   (Needs to not have a sitter for 24 hours)

## 2023-01-13 ENCOUNTER — TELEPHONE (OUTPATIENT)
Dept: WOUND CARE | Facility: CLINIC | Age: 88
End: 2023-01-13
Payer: MEDICARE

## 2023-01-13 ENCOUNTER — TELEPHONE (OUTPATIENT)
Dept: INTERNAL MEDICINE | Facility: CLINIC | Age: 88
End: 2023-01-13
Payer: MEDICARE

## 2023-01-13 DIAGNOSIS — H91.90 HEARING LOSS, UNSPECIFIED HEARING LOSS TYPE, UNSPECIFIED LATERALITY: Primary | ICD-10-CM

## 2023-01-13 LAB
ALBUMIN SERPL BCP-MCNC: 2.8 G/DL (ref 3.5–5.2)
ALP SERPL-CCNC: 124 U/L (ref 55–135)
ALT SERPL W/O P-5'-P-CCNC: 17 U/L (ref 10–44)
ANION GAP SERPL CALC-SCNC: 11 MMOL/L (ref 8–16)
AST SERPL-CCNC: 17 U/L (ref 10–40)
BASOPHILS # BLD AUTO: 0.04 K/UL (ref 0–0.2)
BASOPHILS NFR BLD: 0.7 % (ref 0–1.9)
BILIRUB SERPL-MCNC: 0.5 MG/DL (ref 0.1–1)
BUN SERPL-MCNC: 41 MG/DL (ref 8–23)
CALCIUM SERPL-MCNC: 8.4 MG/DL (ref 8.7–10.5)
CHLORIDE SERPL-SCNC: 107 MMOL/L (ref 95–110)
CO2 SERPL-SCNC: 23 MMOL/L (ref 23–29)
CREAT SERPL-MCNC: 1.2 MG/DL (ref 0.5–1.4)
CRP SERPL-MCNC: 23 MG/L (ref 0–8.2)
DIFFERENTIAL METHOD: ABNORMAL
EOSINOPHIL # BLD AUTO: 0.1 K/UL (ref 0–0.5)
EOSINOPHIL NFR BLD: 2.4 % (ref 0–8)
ERYTHROCYTE [DISTWIDTH] IN BLOOD BY AUTOMATED COUNT: 16 % (ref 11.5–14.5)
ERYTHROCYTE [SEDIMENTATION RATE] IN BLOOD BY PHOTOMETRIC METHOD: 66 MM/HR (ref 0–36)
EST. GFR  (NO RACE VARIABLE): 43 ML/MIN/1.73 M^2
GLUCOSE SERPL-MCNC: 108 MG/DL (ref 70–110)
HCT VFR BLD AUTO: 26.6 % (ref 37–48.5)
HGB BLD-MCNC: 8.7 G/DL (ref 12–16)
IMM GRANULOCYTES # BLD AUTO: 0.07 K/UL (ref 0–0.04)
IMM GRANULOCYTES NFR BLD AUTO: 1.2 % (ref 0–0.5)
LACTATE SERPL-SCNC: 2.6 MMOL/L (ref 0.5–2.2)
LYMPHOCYTES # BLD AUTO: 1 K/UL (ref 1–4.8)
LYMPHOCYTES NFR BLD: 16.3 % (ref 18–48)
MCH RBC QN AUTO: 28.5 PG (ref 27–31)
MCHC RBC AUTO-ENTMCNC: 32.7 G/DL (ref 32–36)
MCV RBC AUTO: 87 FL (ref 82–98)
MONOCYTES # BLD AUTO: 0.7 K/UL (ref 0.3–1)
MONOCYTES NFR BLD: 12.7 % (ref 4–15)
NEUTROPHILS # BLD AUTO: 3.9 K/UL (ref 1.8–7.7)
NEUTROPHILS NFR BLD: 66.7 % (ref 38–73)
NRBC BLD-RTO: 0 /100 WBC
PLATELET # BLD AUTO: 229 K/UL (ref 150–450)
PMV BLD AUTO: 10.3 FL (ref 9.2–12.9)
POCT GLUCOSE: 145 MG/DL (ref 70–110)
POTASSIUM SERPL-SCNC: 3.8 MMOL/L (ref 3.5–5.1)
PROCALCITONIN SERPL IA-MCNC: 0.14 NG/ML
PROT SERPL-MCNC: 6.1 G/DL (ref 6–8.4)
RBC # BLD AUTO: 3.05 M/UL (ref 4–5.4)
SODIUM SERPL-SCNC: 141 MMOL/L (ref 136–145)
WBC # BLD AUTO: 5.82 K/UL (ref 3.9–12.7)

## 2023-01-13 PROCEDURE — 36415 COLL VENOUS BLD VENIPUNCTURE: CPT | Mod: HCNC | Performed by: HOSPITALIST

## 2023-01-13 PROCEDURE — 85652 RBC SED RATE AUTOMATED: CPT | Mod: HCNC | Performed by: HOSPITALIST

## 2023-01-13 PROCEDURE — 87077 CULTURE AEROBIC IDENTIFY: CPT | Mod: HCNC

## 2023-01-13 PROCEDURE — 84145 PROCALCITONIN (PCT): CPT | Mod: HCNC | Performed by: HOSPITALIST

## 2023-01-13 PROCEDURE — 86140 C-REACTIVE PROTEIN: CPT | Mod: HCNC | Performed by: HOSPITALIST

## 2023-01-13 PROCEDURE — 85025 COMPLETE CBC W/AUTO DIFF WBC: CPT | Mod: HCNC | Performed by: HOSPITALIST

## 2023-01-13 PROCEDURE — 25000003 PHARM REV CODE 250: Mod: HCNC | Performed by: PHYSICIAN ASSISTANT

## 2023-01-13 PROCEDURE — 36415 COLL VENOUS BLD VENIPUNCTURE: CPT | Mod: HCNC

## 2023-01-13 PROCEDURE — 99233 PR SUBSEQUENT HOSPITAL CARE,LEVL III: ICD-10-PCS | Mod: HCNC,,,

## 2023-01-13 PROCEDURE — 87070 CULTURE OTHR SPECIMN AEROBIC: CPT | Mod: HCNC

## 2023-01-13 PROCEDURE — 99223 PR INITIAL HOSPITAL CARE,LEVL III: ICD-10-PCS | Mod: HCNC,,, | Performed by: INTERNAL MEDICINE

## 2023-01-13 PROCEDURE — 11000001 HC ACUTE MED/SURG PRIVATE ROOM: Mod: HCNC

## 2023-01-13 PROCEDURE — 25000003 PHARM REV CODE 250: Mod: HCNC

## 2023-01-13 PROCEDURE — 25000003 PHARM REV CODE 250: Mod: HCNC | Performed by: HOSPITALIST

## 2023-01-13 PROCEDURE — 83605 ASSAY OF LACTIC ACID: CPT | Mod: HCNC | Performed by: HOSPITALIST

## 2023-01-13 PROCEDURE — 87075 CULTR BACTERIA EXCEPT BLOOD: CPT | Mod: HCNC

## 2023-01-13 PROCEDURE — 87040 BLOOD CULTURE FOR BACTERIA: CPT | Mod: HCNC

## 2023-01-13 PROCEDURE — 99223 1ST HOSP IP/OBS HIGH 75: CPT | Mod: HCNC,,, | Performed by: INTERNAL MEDICINE

## 2023-01-13 PROCEDURE — 87186 SC STD MICRODIL/AGAR DIL: CPT | Mod: HCNC

## 2023-01-13 PROCEDURE — 63600175 PHARM REV CODE 636 W HCPCS: Mod: HCNC | Performed by: HOSPITALIST

## 2023-01-13 PROCEDURE — 80053 COMPREHEN METABOLIC PANEL: CPT | Mod: HCNC

## 2023-01-13 PROCEDURE — 99233 SBSQ HOSP IP/OBS HIGH 50: CPT | Mod: HCNC,,,

## 2023-01-13 RX ORDER — PREDNISONE 20 MG/1
20 TABLET ORAL DAILY
Status: COMPLETED | OUTPATIENT
Start: 2023-01-14 | End: 2023-01-18

## 2023-01-13 RX ORDER — ENOXAPARIN SODIUM 100 MG/ML
40 INJECTION SUBCUTANEOUS EVERY 24 HOURS
Status: DISCONTINUED | OUTPATIENT
Start: 2023-01-13 | End: 2023-01-19

## 2023-01-13 RX ORDER — FLUCONAZOLE 200 MG/1
200 TABLET ORAL DAILY
Status: DISCONTINUED | OUTPATIENT
Start: 2023-01-13 | End: 2023-01-17

## 2023-01-13 RX ADMIN — POLYETHYLENE GLYCOL 3350 17 G: 17 POWDER, FOR SOLUTION ORAL at 08:01

## 2023-01-13 RX ADMIN — TRIAMCINOLONE ACETONIDE: 1 OINTMENT TOPICAL at 08:01

## 2023-01-13 RX ADMIN — SPIRONOLACTONE 25 MG: 25 TABLET, FILM COATED ORAL at 08:01

## 2023-01-13 RX ADMIN — AMOXICILLIN AND CLAVULANATE POTASSIUM 1 TABLET: 875; 125 TABLET, FILM COATED ORAL at 08:01

## 2023-01-13 RX ADMIN — PRAVASTATIN SODIUM 40 MG: 40 TABLET ORAL at 08:01

## 2023-01-13 RX ADMIN — DICLOFENAC SODIUM 2 G: 10 GEL TOPICAL at 08:01

## 2023-01-13 RX ADMIN — SILVER SULFADIAZINE: 10 CREAM TOPICAL at 08:01

## 2023-01-13 RX ADMIN — ENOXAPARIN SODIUM 40 MG: 40 INJECTION SUBCUTANEOUS at 05:01

## 2023-01-13 RX ADMIN — MICONAZOLE NITRATE: 20 CREAM TOPICAL at 08:01

## 2023-01-13 RX ADMIN — DOXYCYCLINE HYCLATE 100 MG: 100 TABLET, COATED ORAL at 08:01

## 2023-01-13 RX ADMIN — FUROSEMIDE 20 MG: 20 TABLET ORAL at 08:01

## 2023-01-13 RX ADMIN — ASPIRIN 81 MG: 81 TABLET, COATED ORAL at 08:01

## 2023-01-13 RX ADMIN — FLUCONAZOLE 200 MG: 200 TABLET ORAL at 05:01

## 2023-01-13 RX ADMIN — FUROSEMIDE 20 MG: 20 TABLET ORAL at 05:01

## 2023-01-13 RX ADMIN — SENNOSIDES 8.6 MG: 8.6 TABLET, FILM COATED ORAL at 08:01

## 2023-01-13 RX ADMIN — OLANZAPINE 2.5 MG: 2.5 TABLET, FILM COATED ORAL at 08:01

## 2023-01-13 NOTE — ASSESSMENT & PLAN NOTE
- rash developing over upper lower extremities and abdomen  - nonspecific skin eruption vs medication reaction  - dermatology consulted and recommend d/c cipro & start triamcinolone cream to entire body BID x 14 days

## 2023-01-13 NOTE — TELEPHONE ENCOUNTER
----- Message from Yamini Madrigal sent at 1/13/2023 10:40 AM CST -----  Contact: Afshan (daughter)  [phone]  Patient would like to get a referral.  Referral to what specialty:  audiology  Does the patient want the referral with a specific physician:  hearing aids  [facility]  Reason (be specific):  hearing loss  Does the patient already have the specialty clinic appointment scheduled:    If yes, what date is the appointment scheduled:     Is the insurance listed in Epic correct? (this is important for a referral):  yes  Advised patient that once provider approves this either a nurse or  will return their call?:   Would the patient like a call back, or a response through their [facility] portal?:   call back  Comments:       Please call and advise

## 2023-01-13 NOTE — PLAN OF CARE
Problem: Adult Inpatient Plan of Care  Goal: Plan of Care Review  Outcome: Ongoing, Progressing     Problem: Adult Inpatient Plan of Care  Goal: Absence of Hospital-Acquired Illness or Injury  Outcome: Ongoing, Progressing     Problem: Adult Inpatient Plan of Care  Goal: Optimal Comfort and Wellbeing  Outcome: Ongoing, Progressing     Problem: Infection  Goal: Absence of Infection Signs and Symptoms  Outcome: Ongoing, Progressing     Problem: Impaired Wound Healing  Goal: Optimal Wound Healing  Outcome: Ongoing, Progressing
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
## 2023-01-13 NOTE — ASSESSMENT & PLAN NOTE
- acute on chronic issue  - afebrile without leukocytosis  - ESR, CRP elevated  - LE US negative for DVT  - s/p IV vanc x3  - cipro discontinued for suspected drug eruption  - continue doxy for gram neg and pseudomonas coverage  - Pain control with scheduled tylenol, prn oxy 5 q6h and oxy 10 mg q6 prn (to be used with dressing changes)    - bowel regimen in place to prevent opioid induced constipation  - wound care consulted, continue BID dressing changes by nursing   -- ana added to promote wound healing  - elevate extremity

## 2023-01-13 NOTE — PLAN OF CARE
01/13/23 1355   Post-Acute Status   Post-Acute Authorization Placement   Post-Acute Placement Status Pending medical clearance/testing   Discharge Delays   (Not medically ready for discharge)   Discharge Plan   Discharge Plan A Skilled Nursing Facility   Discharge Plan B Home Health

## 2023-01-13 NOTE — PROGRESS NOTES
Pharmacist Renal Dose Adjustment Note    Jenniffer Jimenez is a 89 y.o. female being treated with the medication lovenox     Patient Data:    Vital Signs (Most Recent):  Temp: 97.4 °F (36.3 °C) (01/13/23 1122)  Pulse: 68 (01/13/23 1122)  Resp: 18 (01/13/23 1122)  BP: (!) 125/58 (01/13/23 1122)  SpO2: 97 % (01/13/23 1122)   Vital Signs (72h Range):  Temp:  [97.2 °F (36.2 °C)-98.2 °F (36.8 °C)]   Pulse:  []   Resp:  [12-20]   BP: (114-154)/(56-77)   SpO2:  [91 %-100 %]      Recent Labs   Lab 01/12/23  0442 01/12/23  1702 01/13/23  1015   CREATININE 1.2 1.1 1.2     Serum creatinine: 1.2 mg/dL 01/13/23 1015  Estimated creatinine clearance: 31.9 mL/min    Medication:lovenox 30 mg QD will be changed to lovenox 40 mg QD     Pharmacist's Name: MAURA DUMONT  Pharmacist's Extension: [phone]

## 2023-01-13 NOTE — SUBJECTIVE & OBJECTIVE
Interval History:   Pt seen and examined by me this morning. JOSE RAUL WILLSON. Pt reports feeling the urge to urinate and is tearful regarding care for her cat at home. Psychiatry consulted and recommend decreasing nightly zyprexa. Bladder scan revealed 750 cc pre-void and 220 cc post-void. Dermatology consulted regarding worsening rash on patients upper lower extremities and abdomen and recommend applying triamcinolone to the entire body BID x 2 weeks. Continuing RLE dressing changes BID, dressing clean, dry and intact. Pt with new high anion gap metabolic acidosis on morning labs, which improved s/p 500 cc LR.     Review of Systems   Unable to perform ROS: Mental status change   Objective:     Vital Signs (Most Recent):  Temp: 98 °F (36.7 °C) (01/12/23 1916)  Pulse: 70 (01/12/23 1916)  Resp: 18 (01/12/23 1916)  BP: 124/61 (01/12/23 1916)  SpO2: 98 % (01/12/23 1916) Vital Signs (24h Range):  Temp:  [97.2 °F (36.2 °C)-98.2 °F (36.8 °C)] 98 °F (36.7 °C)  Pulse:  [64-84] 70  Resp:  [12-18] 18  SpO2:  [91 %-100 %] 98 %  BP: (118-145)/(56-72) 124/61     Weight: 76.8 kg (169 lb 5 oz)  Body mass index is 28.18 kg/m².  No intake or output data in the 24 hours ending 01/12/23 1926     Physical Exam  Vitals and nursing note reviewed.   Constitutional:       General: She is not in acute distress.     Appearance: She is well-developed.   HENT:      Head: Normocephalic and atraumatic.      Mouth/Throat:      Mouth: Mucous membranes are dry.      Pharynx: No oropharyngeal exudate.   Eyes:      Extraocular Movements: Extraocular movements intact.      Conjunctiva/sclera: Conjunctivae normal.      Pupils: Pupils are equal, round, and reactive to light.   Cardiovascular:      Rate and Rhythm: Normal rate and regular rhythm.      Heart sounds: Normal heart sounds.   Pulmonary:      Effort: Pulmonary effort is normal. No respiratory distress.      Breath sounds: Normal breath sounds. No wheezing or rales.   Abdominal:      General: Bowel  sounds are normal. There is no distension.      Palpations: Abdomen is soft.      Tenderness: There is no abdominal tenderness.      Comments: Erythematous irregular patch to low abdomen and mons pubis with associated excoriations.   Musculoskeletal:         General: Signs of injury present. No swelling or tenderness. Normal range of motion.      Cervical back: Normal range of motion and neck supple.   Lymphadenopathy:      Cervical: No cervical adenopathy.   Skin:     General: Skin is warm and dry.      Capillary Refill: Capillary refill takes less than 2 seconds.      Findings: Erythema and rash (upper lower extremities and abdomen) present.      Comments: RLE dressing clean, dry, and intact.    Neurological:      Mental Status: She is alert and oriented to person, place, and time.      Cranial Nerves: No cranial nerve deficit.      Sensory: No sensory deficit.      Coordination: Coordination normal.   Psychiatric:         Behavior: Behavior normal.         Thought Content: Thought content normal.         Judgment: Judgment normal.       Significant Labs: All pertinent labs within the past 24 hours have been reviewed.  CMP:   Recent Labs   Lab 01/11/23  0622 01/12/23  0442 01/12/23  1702    146* 142   K 3.5 3.3* 3.8   * 113* 111*   CO2 20* 15* 21*   * 106 128*   BUN 46* 48* 45*   CREATININE 1.1 1.2 1.1   CALCIUM 9.0 8.8 8.7   PROT 5.9* 5.8* 6.2   ALBUMIN 2.6* 2.5* 2.7*   BILITOT 0.4 0.4 0.4   ALKPHOS 113 110 121   AST 21 19 20   ALT 13 13 15   ANIONGAP 11 18* 10         Significant Imaging: I have reviewed all pertinent imaging results/findings within the past 24 hours.

## 2023-01-13 NOTE — SUBJECTIVE & OBJECTIVE
"Past Medical History:   Diagnosis Date    Amblyopia of left eye 4/10/2013    Anxiety     Arthritis     Facet arthropathy, Lumbosacral    Cataract     Central retinal vein occlusion of left eye     Depression     Diabetic polyneuropathy 2022    Exotropia of both eyes 2013    recession RSR 5.0mm w/ adj; recession LR os 5.0 w/ adj; resect MR os  4.0mm    Hearing loss     History of resection of small bowel     Hypertensive retinopathy of both eyes     Hypoglycemia     Macular degeneration     OA (osteoarthritis) of shoulder     Right    Osteoporosis     Posterior vitreous detachment of both eyes     Rhinitis     TIA (transient ischemic attack)        Past Surgical History:   Procedure Laterality Date    APPENDECTOMY      CARDIAC CATHETERIZATION      CATARACT EXTRACTION W/  INTRAOCULAR LENS IMPLANT Bilateral      SECTION, CLASSIC      CLOSURE OF LEFT ATRIAL APPENDAGE USING DEVICE N/A 2020    Procedure: Left atrial appendage closure device;  Surgeon: Abundio Curtis MD;  Location: [facility] CATH LAB;  Service: Cardiology;  Laterality: N/A;    HYSTERECTOMY      INNER EAR SURGERY      JOINT REPLACEMENT      LEFT KNEE REPLACEMENT IN  -    OOPHORECTOMY      SINUS SURGERY      STRABISMUS SURGERY  13    RSR recession 5 mm, LLR recession 5 mm and LMR resection 4mm    STRABISMUS SURGERY  2014    recess LR OD 6mm    TONSILLECTOMY      watchman surgery N/A 2020       Review of patient's allergies indicates:   Allergen Reactions    Opioids - morphine analogues Other (See Comments)     Bowel issues; bowel obstruction    Tizanidine Other (See Comments)     "Lips were numb,  Almost passed out."    Tramadol Hallucinations    Beta-blockers (beta-adrenergic blocking agts) Other (See Comments)     Can not go on beta blockers for long period of time - due to taking allergy injections    Morphine     Opioids-meperidine and related     Ciprofloxacin Rash       Medications:  Medications Prior to Admission "   Medication Sig    acetaminophen (TYLENOL) 500 MG tablet Take 1,000 mg by mouth 2 (two) times a day.    aspirin (ECOTRIN) 81 MG EC tablet Take 1 tablet (81 mg total) by mouth once daily.    azelastine (ASTELIN) 137 mcg (0.1 %) nasal spray 1 spray (137 mcg total) by Nasal route 2 (two) times daily.    clotrimazole (LOTRIMIN) 1 % cream Apply topically 2 (two) times daily. Apply up to 4 weeks to affected area    diclofenac sodium (VOLTAREN) 1 % Gel Apply 2 g topically 4 (four) times daily. Use gloves to apply for 10 days    famotidine (PEPCID) 20 MG tablet Take 1 tablet (20 mg total) by mouth 2 (two) times daily. For Allergic Reaction.    fluticasone propionate (FLONASE) 50 mcg/actuation nasal spray USE 1 SPRAY IN EACH NOSTRIL ONE TIME DAILY    furosemide (LASIX) 20 MG tablet Take 1 tablet (20 mg total) by mouth 2 (two) times daily before meals. Take twice a day for Weight greater than 160 lb, once a day for weights less than 160 lb    gentamicin (GARAMYCIN) 0.1 % cream APPLY TOPICALLY TO THE AFFECTED AREA EVERY DAY AS NEEDED AS DIRECTED    guaiFENesin 100 mg/5 ml (ROBITUSSIN) 100 mg/5 mL syrup Take 15 mLs by mouth once daily.    levocetirizine (XYZAL) 5 MG tablet Take 1 tablet (5 mg total) by mouth once daily. For Allergies.    LIDOcaine (LIDODERM) 5 % Place 1 patch onto the skin once daily. Remove & Discard patch within 12 hours or as directed by provider    miconazole nitrate 2% (MICOTIN) 2 % Oint Apply topically every other day.    mupirocin (BACTROBAN) 2 % ointment Apply topically 3 (three) times daily. Apply locally every other day    pravastatin (PRAVACHOL) 40 MG tablet Take 1 tablet (40 mg total) by mouth once daily.    spironolactone (ALDACTONE) 25 MG tablet Take twice a day for Weight greater than 160 lb, once a day for weights less than 160 lb    vit C/E/Zn/coppr/lutein/zeaxan (OCUVITE LUTEIN AND ZEAXANTHIN ORAL) Take 1 capsule by mouth once daily. Lutien 25 mg, Zeaxanthin 5 mg    vitamin E 400 UNIT capsule  Take 400 Units by mouth once daily.     Antibiotics (From admission, onward)      Start     Stop Route Frequency Ordered    01/06/23 2100  silver sulfADIAZINE 1% cream         -- Top 2 times daily 01/06/23 1335          Antifungals (From admission, onward)      Start     Stop Route Frequency Ordered    01/13/23 1600  fluconazole tablet 200 mg         -- Oral Daily 01/13/23 1546    01/08/23 2100  miconazole 2 % cream         -- Top 2 times daily 01/08/23 1251          Antivirals (From admission, onward)      None             Immunization History   Administered Date(s) Administered    COVID-19, MRNA, LN-S, PF (Pfizer) (Gray Cap) 05/01/2022    COVID-19, MRNA, LN-S, PF (Pfizer) (Purple Cap) 01/14/2021, 02/05/2021, 09/17/2021    COVID-19, mRNA, LNP-S, bivalent booster, PF (PFIZER OMICRON) 10/14/2022    Influenza (FLUAD) - Quadrivalent - Adjuvanted - PF *Preferred* (65+) 11/05/2020, 09/29/2022    Influenza - High Dose - PF (65 years and older) 09/19/2012, 09/24/2013, 10/08/2014, 09/21/2015, 10/01/2016, 09/21/2017, 08/30/2018    Influenza - Quadrivalent 09/04/2019, 09/04/2019    Influenza - Quadrivalent - High Dose - PF (65 years and older) 09/17/2021    Influenza - Trivalent (ADULT) 10/20/2006, 10/03/2007, 10/06/2008, 09/24/2009, 09/28/2010, 09/20/2011    Influenza A (H1N1) 2009 Monovalent - IM - PF 01/06/2010    Pneumococcal Conjugate - 13 Valent 09/14/2015    Pneumococcal Polysaccharide - 23 Valent 10/20/2001, 10/04/2010    Tdap 03/11/2018    Zoster Recombinant 03/12/2020, 07/12/2020       Family History       Problem Relation (Age of Onset)    Breast cancer Maternal Aunt    Diabetes Sister, Brother    Heart disease Sister, Sister    Hypertension Mother, Father    Liver disease Sister    No Known Problems Maternal Uncle, Paternal Aunt, Paternal Uncle, Maternal Grandmother, Maternal Grandfather, Paternal Grandmother, Paternal Grandfather, Daughter, Son, Son, Son          Social History     Socioeconomic History     Marital status:    Occupational History    Occupation: retired   Tobacco Use    Smoking status: Former     Types: Cigarettes     Quit date: 10/29/1982     Years since quittin.2     Passive exposure: Never    Smokeless tobacco: Never   Substance and Sexual Activity    Alcohol use: Yes     Alcohol/week: 2.0 standard drinks     Types: 2 Shots of liquor per week     Comment: rare    Drug use: No    Sexual activity: Never   Other Topics Concern    Are you pregnant or think you may be? No    Breast-feeding No     Social Determinants of Health     Financial Resource Strain: Low Risk     Difficulty of Paying Living Expenses: Not hard at all   Food Insecurity: No Food Insecurity    Worried About Running Out of Food in the Last Year: Never true    Ran Out of Food in the Last Year: Never true   Transportation Needs: No Transportation Needs    Lack of Transportation (Medical): No    Lack of Transportation (Non-Medical): No   Physical Activity: Inactive    Days of Exercise per Week: 0 days    Minutes of Exercise per Session: 0 min   Stress: Stress Concern Present    Feeling of Stress : To some extent   Social Connections: Socially Isolated    Frequency of Communication with Friends and Family: Once a week    Frequency of Social Gatherings with Friends and Family: Once a week    Attends Jewish Services: Never    Active Member of Clubs or Organizations: Yes    Attends Club or Organization Meetings: Never    Marital Status:    Housing Stability: Low Risk     Unable to Pay for Housing in the Last Year: No    Number of Places Lived in the Last Year: 1    Unstable Housing in the Last Year: No     Review of Systems   Constitutional:  Negative for diaphoresis, fatigue and fever.   Respiratory:  Negative for cough and shortness of breath.    Cardiovascular:  Negative for chest pain, palpitations and leg swelling.   Gastrointestinal:  Negative for abdominal pain, diarrhea and vomiting.   Genitourinary:  Negative  for dysuria.   Musculoskeletal:  Positive for myalgias.   Skin:  Positive for color change, rash and wound.   Neurological:  Negative for headaches.   Objective:     Vital Signs (Most Recent):  Temp: 97.6 °F (36.4 °C) (01/13/23 1533)  Pulse: 71 (01/13/23 1533)  Resp: 18 (01/13/23 1533)  BP: 134/63 (01/13/23 1533)  SpO2: 98 % (01/13/23 1533) Vital Signs (24h Range):  Temp:  [97.2 °F (36.2 °C)-98.2 °F (36.8 °C)] 97.6 °F (36.4 °C)  Pulse:  [63-84] 71  Resp:  [18] 18  SpO2:  [95 %-100 %] 98 %  BP: (115-140)/(58-66) 134/63     Weight: 76.8 kg (169 lb 5 oz)  Body mass index is 28.18 kg/m².    Estimated Creatinine Clearance: 31.9 mL/min (based on SCr of 1.2 mg/dL).    Physical Exam  Vitals and nursing note reviewed.   Constitutional:       General: She is not in acute distress.     Appearance: She is well-developed. She is not diaphoretic.      Comments: Hard of hearing   HENT:      Head: Normocephalic.   Eyes:      Pupils: Pupils are equal, round, and reactive to light.   Cardiovascular:      Rate and Rhythm: Normal rate and regular rhythm.      Heart sounds: Normal heart sounds.   Pulmonary:      Effort: Pulmonary effort is normal. No respiratory distress.      Breath sounds: Normal breath sounds.   Abdominal:      General: Bowel sounds are normal.      Palpations: Abdomen is soft.   Musculoskeletal:         General: Tenderness and signs of injury present. Normal range of motion.      Cervical back: Normal range of motion and neck supple.      Right lower leg: Edema present.   Skin:     General: Skin is warm and dry.      Findings: Erythema and rash present.      Comments: Extensive rash of upper, lower, trunk and back  Candida intertrigo noted of gluteal folds, groin, lower extremities and feet   Neurological:      Mental Status: She is alert.   Psychiatric:         Behavior: Behavior normal.       Significant Labs: All pertinent labs within the past 24 hours have been reviewed.    Significant Imaging: I have reviewed  all pertinent imaging results/findings within the past 24 hours.

## 2023-01-13 NOTE — ASSESSMENT & PLAN NOTE
- discontinued benadryl and hydroxyzine given delirium overnight   - overnight 01/09-01/10 - patient hitting technician   -- requiring IM Zyprexa x 1 and restraints  - overnight 01/10-01/11 - patient hitting technician despite oral zyprexa yesterday  - psychiatry consulted, will follow recs  - nightly zyprexa decreased to 2.5 mg per psychiatry

## 2023-01-13 NOTE — CONSULTS
[facility]  Infectious Disease  Consult Note    Patient Name: Jenniffer Jimenez  MRN: [id number]  Admission Date: 1/4/2023  Hospital Length of Stay: 4 days  Attending Physician: Nely Ruiz MD  Primary Care Provider: Rubi Young DO     Isolation Status: No active isolations      Inpatient consult to Infectious Diseases  Consult performed by: Agnes Mcneil PA-C  Consult ordered by: Lorena Moore PA-C        Assessment/Plan:     * Cellulitis of right lower extremity  89 y/o female with HLD, HTN admitted for recurrent RLE cellulitis and drug rash.      1. RLE erythema despite prolong course of abx therapy. Would monitor off of abx at this time. Start trial of steroids (5 day course)  and antifungals  2. Dermatology following, no concerning feature to prompt skin biopsy at this time.   3. Continue local wound care and steroid cream for rash     Seen and discussed with ID staff. Discussed with primary team. ID will follow with you         Thank you for your consult. I will follow-up with patient. Please contact us if you have any additional questions.    Agnes Mcneil PA-C  Infectious Disease  [facility] - Observation 11H    Subjective:     Principal Problem: Cellulitis of right lower extremity    HPI: 89 y/o female with HLD, HTN, AF, admitted for recurrent RLE cellulitis and drug rash. Pt is followed by wound care outpatient and recently completed a course of ciprofloxacin in December. She developed drug rash and was seen at urgent care. Rash not improved and RLE erythema worsen so presented to ED. Pt remained afebrile, stable, no leukocytosis.     She was on IV vancomycin and ciprofloxacin. She was seen by dermatology, suspect ciprofloxacin caused drug rash. No biopsy at this time for RLE. Vascular previously saw pt in 11/2022. No acute intervention at this time. She is currently on doxycycline and augmentin.       Past Medical History:   Diagnosis Date    Amblyopia of left eye 4/10/2013  "   Anxiety     Arthritis     Facet arthropathy, Lumbosacral    Cataract     Central retinal vein occlusion of left eye     Depression     Diabetic polyneuropathy 2022    Exotropia of both eyes 2013    recession RSR 5.0mm w/ adj; recession LR os 5.0 w/ adj; resect MR os  4.0mm    Hearing loss     History of resection of small bowel     Hypertensive retinopathy of both eyes     Hypoglycemia     Macular degeneration     OA (osteoarthritis) of shoulder     Right    Osteoporosis     Posterior vitreous detachment of both eyes     Rhinitis     TIA (transient ischemic attack)        Past Surgical History:   Procedure Laterality Date    APPENDECTOMY      CARDIAC CATHETERIZATION      CATARACT EXTRACTION W/  INTRAOCULAR LENS IMPLANT Bilateral      SECTION, CLASSIC      CLOSURE OF LEFT ATRIAL APPENDAGE USING DEVICE N/A 2020    Procedure: Left atrial appendage closure device;  Surgeon: Abundio Curtis MD;  Location: [facility] CATH LAB;  Service: Cardiology;  Laterality: N/A;    HYSTERECTOMY      INNER EAR SURGERY      JOINT REPLACEMENT      LEFT KNEE REPLACEMENT IN  -    OOPHORECTOMY      SINUS SURGERY      STRABISMUS SURGERY  13    RSR recession 5 mm, LLR recession 5 mm and LMR resection 4mm    STRABISMUS SURGERY  2014    recess LR OD 6mm    TONSILLECTOMY      watchman surgery N/A 2020       Review of patient's allergies indicates:   Allergen Reactions    Opioids - morphine analogues Other (See Comments)     Bowel issues; bowel obstruction    Tizanidine Other (See Comments)     "Lips were numb,  Almost passed out."    Tramadol Hallucinations    Beta-blockers (beta-adrenergic blocking agts) Other (See Comments)     Can not go on beta blockers for long period of time - due to taking allergy injections    Morphine     Opioids-meperidine and related     Ciprofloxacin Rash       Medications:  Medications Prior to Admission   Medication Sig    acetaminophen " (TYLENOL) 500 MG tablet Take 1,000 mg by mouth 2 (two) times a day.    aspirin (ECOTRIN) 81 MG EC tablet Take 1 tablet (81 mg total) by mouth once daily.    azelastine (ASTELIN) 137 mcg (0.1 %) nasal spray 1 spray (137 mcg total) by Nasal route 2 (two) times daily.    clotrimazole (LOTRIMIN) 1 % cream Apply topically 2 (two) times daily. Apply up to 4 weeks to affected area    diclofenac sodium (VOLTAREN) 1 % Gel Apply 2 g topically 4 (four) times daily. Use gloves to apply for 10 days    famotidine (PEPCID) 20 MG tablet Take 1 tablet (20 mg total) by mouth 2 (two) times daily. For Allergic Reaction.    fluticasone propionate (FLONASE) 50 mcg/actuation nasal spray USE 1 SPRAY IN EACH NOSTRIL ONE TIME DAILY    furosemide (LASIX) 20 MG tablet Take 1 tablet (20 mg total) by mouth 2 (two) times daily before meals. Take twice a day for Weight greater than 160 lb, once a day for weights less than 160 lb    gentamicin (GARAMYCIN) 0.1 % cream APPLY TOPICALLY TO THE AFFECTED AREA EVERY DAY AS NEEDED AS DIRECTED    guaiFENesin 100 mg/5 ml (ROBITUSSIN) 100 mg/5 mL syrup Take 15 mLs by mouth once daily.    levocetirizine (XYZAL) 5 MG tablet Take 1 tablet (5 mg total) by mouth once daily. For Allergies.    LIDOcaine (LIDODERM) 5 % Place 1 patch onto the skin once daily. Remove & Discard patch within 12 hours or as directed by provider    miconazole nitrate 2% (MICOTIN) 2 % Oint Apply topically every other day.    mupirocin (BACTROBAN) 2 % ointment Apply topically 3 (three) times daily. Apply locally every other day    pravastatin (PRAVACHOL) 40 MG tablet Take 1 tablet (40 mg total) by mouth once daily.    spironolactone (ALDACTONE) 25 MG tablet Take twice a day for Weight greater than 160 lb, once a day for weights less than 160 lb    vit C/E/Zn/coppr/lutein/zeaxan (OCUVITE LUTEIN AND ZEAXANTHIN ORAL) Take 1 capsule by mouth once daily. Lutien 25 mg, Zeaxanthin 5 mg    vitamin E 400 UNIT capsule Take 400 Units  by mouth once daily.     Antibiotics (From admission, onward)      Start     Stop Route Frequency Ordered    01/06/23 2100  silver sulfADIAZINE 1% cream         -- Top 2 times daily 01/06/23 1335          Antifungals (From admission, onward)      Start     Stop Route Frequency Ordered    01/13/23 1600  fluconazole tablet 200 mg         -- Oral Daily 01/13/23 1546    01/08/23 2100  miconazole 2 % cream         -- Top 2 times daily 01/08/23 1251          Antivirals (From admission, onward)      None             Immunization History   Administered Date(s) Administered    COVID-19, MRNA, LN-S, PF (Pfizer) (Gray Cap) 05/01/2022    COVID-19, MRNA, LN-S, PF (Pfizer) (Purple Cap) 01/14/2021, 02/05/2021, 09/17/2021    COVID-19, mRNA, LNP-S, bivalent booster, PF (PFIZER OMICRON) 10/14/2022    Influenza (FLUAD) - Quadrivalent - Adjuvanted - PF *Preferred* (65+) 11/05/2020, 09/29/2022    Influenza - High Dose - PF (65 years and older) 09/19/2012, 09/24/2013, 10/08/2014, 09/21/2015, 10/01/2016, 09/21/2017, 08/30/2018    Influenza - Quadrivalent 09/04/2019, 09/04/2019    Influenza - Quadrivalent - High Dose - PF (65 years and older) 09/17/2021    Influenza - Trivalent (ADULT) 10/20/2006, 10/03/2007, 10/06/2008, 09/24/2009, 09/28/2010, 09/20/2011    Influenza A (H1N1) 2009 Monovalent - IM - PF 01/06/2010    Pneumococcal Conjugate - 13 Valent 09/14/2015    Pneumococcal Polysaccharide - 23 Valent 10/20/2001, 10/04/2010    Tdap 03/11/2018    Zoster Recombinant 03/12/2020, 07/12/2020       Family History       Problem Relation (Age of Onset)    Breast cancer Maternal Aunt    Diabetes Sister, Brother    Heart disease Sister, Sister    Hypertension Mother, Father    Liver disease Sister    No Known Problems Maternal Uncle, Paternal Aunt, Paternal Uncle, Maternal Grandmother, Maternal Grandfather, Paternal Grandmother, Paternal Grandfather, Daughter, Son, Son, Son          Social History     Socioeconomic History    Marital  status:    Occupational History    Occupation: retired   Tobacco Use    Smoking status: Former     Types: Cigarettes     Quit date: 10/29/1982     Years since quittin.2     Passive exposure: Never    Smokeless tobacco: Never   Substance and Sexual Activity    Alcohol use: Yes     Alcohol/week: 2.0 standard drinks     Types: 2 Shots of liquor per week     Comment: rare    Drug use: No    Sexual activity: Never   Other Topics Concern    Are you pregnant or think you may be? No    Breast-feeding No     Social Determinants of Health     Financial Resource Strain: Low Risk     Difficulty of Paying Living Expenses: Not hard at all   Food Insecurity: No Food Insecurity    Worried About Running Out of Food in the Last Year: Never true    Ran Out of Food in the Last Year: Never true   Transportation Needs: No Transportation Needs    Lack of Transportation (Medical): No    Lack of Transportation (Non-Medical): No   Physical Activity: Inactive    Days of Exercise per Week: 0 days    Minutes of Exercise per Session: 0 min   Stress: Stress Concern Present    Feeling of Stress : To some extent   Social Connections: Socially Isolated    Frequency of Communication with Friends and Family: Once a week    Frequency of Social Gatherings with Friends and Family: Once a week    Attends [employer] Services: Never    Active Member of Clubs or Organizations: Yes    Attends Club or Organization Meetings: Never    Marital Status:    Housing Stability: Low Risk     Unable to Pay for Housing in the Last Year: No    Number of Places Lived in the Last Year: 1    Unstable Housing in the Last Year: No     Review of Systems   Constitutional:  Negative for diaphoresis, fatigue and fever.   Respiratory:  Negative for cough and shortness of breath.    Cardiovascular:  Negative for chest pain, palpitations and leg swelling.   Gastrointestinal:  Negative for abdominal pain, diarrhea and vomiting.    Genitourinary:  Negative for dysuria.   Musculoskeletal:  Positive for myalgias.   Skin:  Positive for color change, rash and wound.   Neurological:  Negative for headaches.   Objective:     Vital Signs (Most Recent):  Temp: 97.6 °F (36.4 °C) (01/13/23 1533)  Pulse: 71 (01/13/23 1533)  Resp: 18 (01/13/23 1533)  BP: 134/63 (01/13/23 1533)  SpO2: 98 % (01/13/23 1533) Vital Signs (24h Range):  Temp:  [97.2 °F (36.2 °C)-98.2 °F (36.8 °C)] 97.6 °F (36.4 °C)  Pulse:  [63-84] 71  Resp:  [18] 18  SpO2:  [95 %-100 %] 98 %  BP: (115-140)/(58-66) 134/63     Weight: 76.8 kg (169 lb 5 oz)  Body mass index is 28.18 kg/m².    Estimated Creatinine Clearance: 31.9 mL/min (based on SCr of 1.2 mg/dL).    Physical Exam  Vitals and nursing note reviewed.   Constitutional:       General: She is not in acute distress.     Appearance: She is well-developed. She is not diaphoretic.      Comments: Hard of hearing   HENT:      Head: Normocephalic.   Eyes:      Pupils: Pupils are equal, round, and reactive to light.   Cardiovascular:      Rate and Rhythm: Normal rate and regular rhythm.      Heart sounds: Normal heart sounds.   Pulmonary:      Effort: Pulmonary effort is normal. No respiratory distress.      Breath sounds: Normal breath sounds.   Abdominal:      General: Bowel sounds are normal.      Palpations: Abdomen is soft.   Musculoskeletal:         General: Tenderness and signs of injury present. Normal range of motion.      Cervical back: Normal range of motion and neck supple.      Right lower leg: Edema present.   Skin:     General: Skin is warm and dry.      Findings: Erythema and rash present.      Comments: Extensive rash of upper, lower, trunk and back  Candida intertrigo noted of gluteal folds, groin, lower extremities and feet   Neurological:      Mental Status: She is alert.   Psychiatric:         Behavior: Behavior normal.       Significant Labs: All pertinent labs within the past 24 hours have been reviewed.    Significant  Imaging: I have reviewed all pertinent imaging results/findings within the past 24 hours.

## 2023-01-13 NOTE — TELEPHONE ENCOUNTER
"Patient's daughter (Afshan) called to speak with the provider about the provider coming to visit the patient in the hospital/ rehab. The daughter was also interested in "rehab for the wound" and wanted to find out if that was possible. I took a message and told the daughter I would have someone call back as soon as possible.   "

## 2023-01-13 NOTE — HPI
89 y/o female with HLD, HTN, AF, admitted for recurrent RLE cellulitis and drug rash. Pt is followed by wound care outpatient and recently completed a course of ciprofloxacin in December. She developed drug rash and was seen at urgent care. Rash not improved and RLE erythema worsen so presented to ED. Pt remained afebrile, stable, no leukocytosis.     She was on IV vancomycin and ciprofloxacin. She was seen by dermatology, suspect ciprofloxacin caused drug rash. No biopsy at this time for RLE. Vascular previously saw pt in 11/2022. No acute intervention at this time. She is currently on doxycycline and augmentin.

## 2023-01-13 NOTE — ASSESSMENT & PLAN NOTE
- Creatinine 1.5 on admission, Cr today Estimated Creatinine Clearance: 34.8 mL/min (based on SCr of 1.1 mg/dL). (baseline 1.2)  - JOSHUA resolution, back to baseline Cr 1.2  - resume home lasix and aldactone  - Continue to monitor on daily labs

## 2023-01-13 NOTE — ASSESSMENT & PLAN NOTE
- Pt complaining of occasional SOB with volume overload  -   - CXR with coarse interstitial lung markings and cardiomegaly   - last echo below  - Continue home lasix  - strict I/Os, daily weights    Results for orders placed during the hospital encounter of 10/07/22  Echo  Interpretation Summary  · The left ventricle is normal in size with concentric remodeling and normal systolic function.  · The estimated ejection fraction is 55-60%.  · Grade III left ventricular diastolic dysfunction.  · Mild mitral regurgitation.  · Normal right ventricular size with mildly reduced right ventricular systolic function.  · Severe tricuspid regurgitation.  · The estimated PA systolic pressure is 52 mmHg. PASP may be under-estimated due to TR severity.  · Elevated central venous pressure (15 mmHg).  · There is pulmonary hypertension.  · Severe left atrial enlargement.

## 2023-01-13 NOTE — ASSESSMENT & PLAN NOTE
Body mass index is 28.18 kg/m².   - Morbid obesity complicates all aspects of disease management from diagnostic modalities to treatment.  - Weight loss encouraged and health benefits explained to patient.

## 2023-01-13 NOTE — ASSESSMENT & PLAN NOTE
Stage 3b chronic kidney disease  - overnight 01/05 K 6.6 - given calcium gluconate, albuterol, and lokelma  - scheduled lokelma discontinued, potassium slightly low this am  - monitor on tele

## 2023-01-14 LAB
ANION GAP SERPL CALC-SCNC: 13 MMOL/L (ref 8–16)
BASOPHILS # BLD AUTO: 0.03 K/UL (ref 0–0.2)
BASOPHILS NFR BLD: 0.6 % (ref 0–1.9)
BUN SERPL-MCNC: 41 MG/DL (ref 8–23)
CALCIUM SERPL-MCNC: 8.4 MG/DL (ref 8.7–10.5)
CHLORIDE SERPL-SCNC: 106 MMOL/L (ref 95–110)
CO2 SERPL-SCNC: 23 MMOL/L (ref 23–29)
CREAT SERPL-MCNC: 1.1 MG/DL (ref 0.5–1.4)
DIFFERENTIAL METHOD: ABNORMAL
EOSINOPHIL # BLD AUTO: 0.1 K/UL (ref 0–0.5)
EOSINOPHIL NFR BLD: 2.9 % (ref 0–8)
ERYTHROCYTE [DISTWIDTH] IN BLOOD BY AUTOMATED COUNT: 16.3 % (ref 11.5–14.5)
EST. GFR  (NO RACE VARIABLE): 47.7 ML/MIN/1.73 M^2
GLUCOSE SERPL-MCNC: 115 MG/DL (ref 70–110)
HCT VFR BLD AUTO: 25.1 % (ref 37–48.5)
HGB BLD-MCNC: 8.2 G/DL (ref 12–16)
IMM GRANULOCYTES # BLD AUTO: 0.05 K/UL (ref 0–0.04)
IMM GRANULOCYTES NFR BLD AUTO: 1 % (ref 0–0.5)
LACTATE SERPL-SCNC: 2.1 MMOL/L (ref 0.5–2.2)
LYMPHOCYTES # BLD AUTO: 1.2 K/UL (ref 1–4.8)
LYMPHOCYTES NFR BLD: 23.8 % (ref 18–48)
MCH RBC QN AUTO: 28 PG (ref 27–31)
MCHC RBC AUTO-ENTMCNC: 32.7 G/DL (ref 32–36)
MCV RBC AUTO: 86 FL (ref 82–98)
MONOCYTES # BLD AUTO: 0.6 K/UL (ref 0.3–1)
MONOCYTES NFR BLD: 12.5 % (ref 4–15)
NEUTROPHILS # BLD AUTO: 2.9 K/UL (ref 1.8–7.7)
NEUTROPHILS NFR BLD: 59.2 % (ref 38–73)
NRBC BLD-RTO: 0 /100 WBC
PLATELET # BLD AUTO: 220 K/UL (ref 150–450)
PMV BLD AUTO: 10.2 FL (ref 9.2–12.9)
POTASSIUM SERPL-SCNC: 3.6 MMOL/L (ref 3.5–5.1)
RBC # BLD AUTO: 2.93 M/UL (ref 4–5.4)
SODIUM SERPL-SCNC: 142 MMOL/L (ref 136–145)
WBC # BLD AUTO: 4.87 K/UL (ref 3.9–12.7)

## 2023-01-14 PROCEDURE — 25000003 PHARM REV CODE 250: Mod: HCNC

## 2023-01-14 PROCEDURE — 11000001 HC ACUTE MED/SURG PRIVATE ROOM: Mod: HCNC

## 2023-01-14 PROCEDURE — 85025 COMPLETE CBC W/AUTO DIFF WBC: CPT | Mod: HCNC | Performed by: HOSPITALIST

## 2023-01-14 PROCEDURE — 25000003 PHARM REV CODE 250: Mod: HCNC | Performed by: PHYSICIAN ASSISTANT

## 2023-01-14 PROCEDURE — 36415 COLL VENOUS BLD VENIPUNCTURE: CPT | Mod: HCNC

## 2023-01-14 PROCEDURE — 93010 ELECTROCARDIOGRAM REPORT: CPT | Mod: HCNC,,, | Performed by: INTERNAL MEDICINE

## 2023-01-14 PROCEDURE — 63600175 PHARM REV CODE 636 W HCPCS: Mod: HCNC

## 2023-01-14 PROCEDURE — 36415 COLL VENOUS BLD VENIPUNCTURE: CPT | Mod: HCNC | Performed by: HOSPITALIST

## 2023-01-14 PROCEDURE — 99233 SBSQ HOSP IP/OBS HIGH 50: CPT | Mod: HCNC,GC,,

## 2023-01-14 PROCEDURE — 80048 BASIC METABOLIC PNL TOTAL CA: CPT | Mod: HCNC

## 2023-01-14 PROCEDURE — 93010 EKG 12-LEAD: ICD-10-PCS | Mod: HCNC,,, | Performed by: INTERNAL MEDICINE

## 2023-01-14 PROCEDURE — 83605 ASSAY OF LACTIC ACID: CPT | Mod: HCNC

## 2023-01-14 PROCEDURE — 63600175 PHARM REV CODE 636 W HCPCS: Mod: HCNC | Performed by: HOSPITALIST

## 2023-01-14 PROCEDURE — 99233 PR SUBSEQUENT HOSPITAL CARE,LEVL III: ICD-10-PCS | Mod: HCNC,GC,,

## 2023-01-14 PROCEDURE — 93005 ELECTROCARDIOGRAM TRACING: CPT | Mod: HCNC

## 2023-01-14 PROCEDURE — 63600175 PHARM REV CODE 636 W HCPCS: Mod: HCNC | Performed by: PHYSICIAN ASSISTANT

## 2023-01-14 RX ORDER — SPIRONOLACTONE 25 MG/1
25 TABLET ORAL DAILY
Status: DISCONTINUED | OUTPATIENT
Start: 2023-01-15 | End: 2023-01-18

## 2023-01-14 RX ORDER — MAGNESIUM SULFATE HEPTAHYDRATE 40 MG/ML
2 INJECTION, SOLUTION INTRAVENOUS ONCE
Status: COMPLETED | OUTPATIENT
Start: 2023-01-14 | End: 2023-01-14

## 2023-01-14 RX ADMIN — MICONAZOLE NITRATE: 20 CREAM TOPICAL at 09:01

## 2023-01-14 RX ADMIN — FUROSEMIDE 20 MG: 20 TABLET ORAL at 09:01

## 2023-01-14 RX ADMIN — FUROSEMIDE 20 MG: 20 TABLET ORAL at 05:01

## 2023-01-14 RX ADMIN — ENOXAPARIN SODIUM 40 MG: 40 INJECTION SUBCUTANEOUS at 05:01

## 2023-01-14 RX ADMIN — TRIAMCINOLONE ACETONIDE: 1 OINTMENT TOPICAL at 09:01

## 2023-01-14 RX ADMIN — POLYETHYLENE GLYCOL 3350 17 G: 17 POWDER, FOR SOLUTION ORAL at 09:01

## 2023-01-14 RX ADMIN — OLANZAPINE 2.5 MG: 2.5 TABLET, FILM COATED ORAL at 09:01

## 2023-01-14 RX ADMIN — FLUCONAZOLE 200 MG: 200 TABLET ORAL at 09:01

## 2023-01-14 RX ADMIN — DICLOFENAC SODIUM 2 G: 10 GEL TOPICAL at 09:01

## 2023-01-14 RX ADMIN — PREDNISONE 20 MG: 20 TABLET ORAL at 09:01

## 2023-01-14 RX ADMIN — SILVER SULFADIAZINE: 10 CREAM TOPICAL at 09:01

## 2023-01-14 RX ADMIN — PRAVASTATIN SODIUM 40 MG: 40 TABLET ORAL at 09:01

## 2023-01-14 RX ADMIN — ASPIRIN 81 MG: 81 TABLET, COATED ORAL at 09:01

## 2023-01-14 RX ADMIN — GUAIFENESIN 200 MG: 200 SOLUTION ORAL at 11:01

## 2023-01-14 RX ADMIN — SPIRONOLACTONE 25 MG: 25 TABLET, FILM COATED ORAL at 09:01

## 2023-01-14 RX ADMIN — MAGNESIUM SULFATE 2 G: 2 INJECTION INTRAVENOUS at 01:01

## 2023-01-14 RX ADMIN — SENNOSIDES 8.6 MG: 8.6 TABLET, FILM COATED ORAL at 09:01

## 2023-01-14 NOTE — SUBJECTIVE & OBJECTIVE
Interval History: Pt seen and examined by me this morning. JOSE RAUL WILLSON. Pt with improving anion gap metabolic acidosis, ESR, CRP, and lactic elevated (but improving). ID consulted for worsening RLE cellulitis.      Review of Systems   Unable to perform ROS: Mental status change   Objective:     Vital Signs (Most Recent):  Temp: 97.6 °F (36.4 °C) (01/13/23 1533)  Pulse: 71 (01/13/23 1533)  Resp: 18 (01/13/23 1533)  BP: 134/63 (01/13/23 1533)  SpO2: 98 % (01/13/23 1533) Vital Signs (24h Range):  Temp:  [97.4 °F (36.3 °C)-98.2 °F (36.8 °C)] 97.6 °F (36.4 °C)  Pulse:  [63-80] 71  Resp:  [18] 18  SpO2:  [95 %-98 %] 98 %  BP: (115-140)/(58-65) 134/63     Weight: 76.8 kg (169 lb 5 oz)  Body mass index is 28.18 kg/m².    Intake/Output Summary (Last 24 hours) at 1/13/2023 1827  Last data filed at 1/13/2023 1225  Gross per 24 hour   Intake --   Output 350 ml   Net -350 ml        Physical Exam  Vitals and nursing note reviewed.   Constitutional:       General: She is not in acute distress.     Appearance: She is well-developed.   HENT:      Head: Normocephalic and atraumatic.      Mouth/Throat:      Mouth: Mucous membranes are moist.      Pharynx: No oropharyngeal exudate.   Eyes:      Extraocular Movements: Extraocular movements intact.      Conjunctiva/sclera: Conjunctivae normal.      Pupils: Pupils are equal, round, and reactive to light.   Cardiovascular:      Rate and Rhythm: Normal rate and regular rhythm.      Heart sounds: Normal heart sounds.   Pulmonary:      Effort: Pulmonary effort is normal. No respiratory distress.      Breath sounds: Normal breath sounds. No wheezing or rales.   Abdominal:      General: Bowel sounds are normal. There is no distension.      Palpations: Abdomen is soft.      Tenderness: There is no abdominal tenderness.      Comments: Candida intertrigo noted to gluteal folds, groin, lower extremities and feet   Musculoskeletal:         General: No swelling or tenderness. Normal range of motion.       Cervical back: Normal range of motion and neck supple.   Lymphadenopathy:      Cervical: No cervical adenopathy.   Skin:     General: Skin is warm and dry.      Capillary Refill: Capillary refill takes less than 2 seconds.      Findings: Erythema and rash (upper and lower extremities, trunk, and back (improved from previous) present.   Neurological:      Mental Status: She is alert and oriented to person, place, and time.      Cranial Nerves: No cranial nerve deficit.      Sensory: No sensory deficit.      Coordination: Coordination normal.   Psychiatric:         Behavior: Behavior normal.         Thought Content: Thought content normal.         Judgment: Judgment normal.       Significant Labs: All pertinent labs within the past 24 hours have been reviewed.  CMP:   Recent Labs   Lab 01/12/23  0442 01/12/23  1702 01/13/23  1015   * 142 141   K 3.3* 3.8 3.8   * 111* 107   CO2 15* 21* 23    128* 108   BUN 48* 45* 41*   CREATININE 1.2 1.1 1.2   CALCIUM 8.8 8.7 8.4*   PROT 5.8* 6.2 6.1   ALBUMIN 2.5* 2.7* 2.8*   BILITOT 0.4 0.4 0.5   ALKPHOS 110 121 124   AST 19 20 17   ALT 13 15 17   ANIONGAP 18* 10 11         Significant Imaging: I have reviewed all pertinent imaging results/findings within the past 24 hours.    US Lower Extremity Veins Bilateral  Narrative: EXAMINATION:  US LOWER EXTREMITY VEINS BILATERAL    CLINICAL HISTORY:  Rule out DVT;    TECHNIQUE:  Duplex and color flow Doppler and dynamic compression was performed of the bilateral lower extremity veins was performed.    COMPARISON:  Ultrasound 11/08/2022 09/25/2022 01/02/2020    FINDINGS:  Right thigh veins: The common femoral, femoral, popliteal, upper greater saphenous, and deep femoral veins are patent and free of thrombus. The veins are normally compressible and have normal phasic flow and augmentation response.    Right calf veins: Posterior tibial vein is not visualized due to overlying bandage.  The remaining visualized calf  "veins are patent.    Left thigh veins: The common femoral, femoral, popliteal, upper greater saphenous, and deep femoral veins are patent and free of thrombus. The veins are normally compressible and have normal phasic flow and augmentation response.    Left calf veins: The visualized calf veins are patent.    Miscellaneous: Left Baker's cyst measuring 0.7 x 0.6 x 0.4 cm.  Bilateral lower extremities edema.  Impression: No evidence of deep venous thrombosis in either lower extremity.    Subcentimeter left Baker's cyst.    Electronically signed by resident: Ekaterina Gutierrez  Date:    01/05/2023  Time:    11:58    Electronically signed by: Joesph Chery MD  Date:    01/05/2023  Time:    12:12  X-Ray Chest AP Portable  Narrative: EXAMINATION:  XR CHEST AP PORTABLE    CLINICAL HISTORY:  Provided history is "SOB;  ".    TECHNIQUE:  One view of the chest.    COMPARISON:  09/25/2022.    FINDINGS:  Cardiac wires overlie the chest.  Cardiomediastinal silhouette is mildly enlarged and similar to the prior study.  Atherosclerotic calcifications overlie the aortic arch.  Stable elevation of the left hemidiaphragm.  Coarsened bilateral interstitial lung markings but no confluent area of consolidation.  No large pleural effusion.  No distinct pneumothorax.  Postoperative changes in the left humerus.  Impression: Cardiomegaly and coarsened interstitial lung markings.  No confluent area of consolidation or detrimental change when compared with 09/25/2022.    Electronically signed by: Tye Ferris MD  Date:    01/05/2023  Time:    01:39     "

## 2023-01-14 NOTE — PLAN OF CARE
Problem: Adult Inpatient Plan of Care  Goal: Plan of Care Review  Outcome: Ongoing, Progressing     Problem: Adult Inpatient Plan of Care  Goal: Absence of Hospital-Acquired Illness or Injury  Outcome: Ongoing, Progressing     Problem: Adult Inpatient Plan of Care  Goal: Optimal Comfort and Wellbeing  Outcome: Ongoing, Progressing     Problem: Impaired Wound Healing  Goal: Optimal Wound Healing  Outcome: Ongoing, Progressing

## 2023-01-14 NOTE — ASSESSMENT & PLAN NOTE
Improving  - Likely secondary to infection   - Improving s/p 500 cc LR   - Continue to monitor on daily labs

## 2023-01-14 NOTE — SUBJECTIVE & OBJECTIVE
Interval History: Pt seen and examined by me this morning. RADHALANEY WILLSON. Pt reporting continued RLE pain, as well as bilateral hip pain. Pain regimen in place. ID consulted for worsening RLE cellulitis, plan to continue steroids and antifungals.     Review of Systems   Unable to perform ROS: Mental status change   Objective:     Vital Signs (Most Recent):  Temp: 98.1 °F (36.7 °C) (01/14/23 1524)  Pulse: 69 (01/14/23 1524)  Resp: 18 (01/14/23 1524)  BP: (!) 131/59 (01/14/23 1524)  SpO2: 95 % (01/14/23 1524) Vital Signs (24h Range):  Temp:  [97.4 °F (36.3 °C)-98.1 °F (36.7 °C)] 98.1 °F (36.7 °C)  Pulse:  [55-73] 69  Resp:  [17-18] 18  SpO2:  [95 %-99 %] 95 %  BP: (102-138)/(49-65) 131/59     Weight: 76.8 kg (169 lb 5 oz)  Body mass index is 28.18 kg/m².  No intake or output data in the 24 hours ending 01/14/23 1636     Physical Exam  Vitals and nursing note reviewed.   Constitutional:       General: She is not in acute distress.     Appearance: She is well-developed.   HENT:      Head: Normocephalic and atraumatic.      Mouth/Throat:      Mouth: Mucous membranes are moist.      Pharynx: No oropharyngeal exudate.   Eyes:      Extraocular Movements: Extraocular movements intact.      Conjunctiva/sclera: Conjunctivae normal.      Pupils: Pupils are equal, round, and reactive to light.   Cardiovascular:      Rate and Rhythm: Normal rate and regular rhythm.      Heart sounds: Normal heart sounds.   Pulmonary:      Effort: Pulmonary effort is normal. No respiratory distress.      Breath sounds: Normal breath sounds. No wheezing or rales.   Abdominal:      General: Bowel sounds are normal. There is no distension.      Palpations: Abdomen is soft.      Tenderness: There is no abdominal tenderness.      Comments: Candida intertrigo noted to gluteal folds, groin, lower extremities and feet   Musculoskeletal:         General: No swelling or tenderness. Normal range of motion.      Cervical back: Normal range of motion and neck  supple.   Lymphadenopathy:      Cervical: No cervical adenopathy.   Skin:     General: Skin is warm and dry.      Capillary Refill: Capillary refill takes less than 2 seconds.      Findings: Erythema and rash (upper and lower extremities, trunk, and back (improved from previous) present.   Neurological:      Mental Status: She is alert and oriented to person, place, and time.      Cranial Nerves: No cranial nerve deficit.      Sensory: No sensory deficit.      Coordination: Coordination normal.   Psychiatric:         Behavior: Behavior normal.         Thought Content: Thought content normal.         Judgment: Judgment normal.               Significant Labs: All pertinent labs within the past 24 hours have been reviewed.  CMP:   Recent Labs   Lab 01/12/23  1702 01/13/23  1015 01/14/23  0938    141 142   K 3.8 3.8 3.6   * 107 106   CO2 21* 23 23   * 108 115*   BUN 45* 41* 41*   CREATININE 1.1 1.2 1.1   CALCIUM 8.7 8.4* 8.4*   PROT 6.2 6.1  --    ALBUMIN 2.7* 2.8*  --    BILITOT 0.4 0.5  --    ALKPHOS 121 124  --    AST 20 17  --    ALT 15 17  --    ANIONGAP 10 11 13         Significant Imaging: I have reviewed all pertinent imaging results/findings within the past 24 hours.    X-Ray Ankle Complete Right  Narrative: EXAMINATION:  XR ANKLE COMPLETE 3 VIEW RIGHT    CLINICAL HISTORY:  r/o osteomyelitis;    TECHNIQUE:  AP, lateral, and oblique images of the right ankle were performed.    COMPARISON:  January 1, 2020    FINDINGS:  There is diffuse osseous demineralization.  No definite acute fracture or bony destructive process is seen.  There is mild soft tissue swelling.  There are vascular calcifications in the soft tissues.  There are calcaneal spurs.  Calcification inferior to the lateral malleolus seen on the prior examination is no longer identified.  Alignment is preserved.  Impression: As above.    Electronically signed by: Madeline Wang MD  Date:    01/14/2023  Time:    07:31  X-Ray Tibia  Fibula 2 View Right  Narrative: EXAMINATION:  XR TIBIA FIBULA 2 VIEW RIGHT    CLINICAL HISTORY:  r/o osteomyelitis;    TECHNIQUE:  AP and lateral views of the right tibia and fibula were performed.    COMPARISON:  October 7, 2022    FINDINGS:  No definite acute fracture or bony destructive process is seen.  Alignment is preserved.  As previously there are mild degenerative changes at the knee.  There are vascular calcifications in the soft tissues.  Impression: No detrimental change since October 7, 2022    Electronically signed by: Madeline Wang MD  Date:    01/14/2023  Time:    07:25
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
## 2023-01-14 NOTE — ASSESSMENT & PLAN NOTE
- acute on chronic issue  - afebrile without leukocytosis  - ESR, CRP elevated  - LE US negative for DVT  - s/p IV vanc x3  - cipro discontinued for suspected drug eruption  - continue doxy for gram neg and pseudomonas coverage  - Pain control with scheduled tylenol, prn oxy 5 q6h and oxy 10 mg q6 prn (to be used with dressing changes)    - bowel regimen in place to prevent opioid induced constipation  - wound care consulted, continue BID dressing changes by nursing   -- ana added to promote wound healing  - elevate extremity  - ID consulted, appreciate recs:  1. RLE erythema despite prolong course of abx therapy. Would monitor off of abx at this time. Start trial of steroids (5 day course) and antifungals  2. Dermatology following, no concerning feature to prompt skin biopsy at this time.   3. Continue local wound care and steroid cream for rash

## 2023-01-14 NOTE — PROGRESS NOTES
[facility] [address] [place] [address] Medicine  Progress Note    Patient Name: Jenniffer Jimenez  MRN: [id number]  Patient Class: IP- Inpatient   Admission Date: 1/4/2023  Length of Stay: 4 days  Attending Physician: Nely Ruiz MD  Primary Care Provider: Rubi Young DO        Subjective:     Principal Problem:Cellulitis of right lower extremity        HPI:  Jenniffer Jimenez is a 89 y.o. female with a PMHx of HLD, HTN, AF s/p watchman, chronic cellulitis/ wound of her right lower extremity who presents to [facility] for evaluation of worsening redness and pain to her right lwoer extremity. Patient is a poor historian and has difficulty hearing. Per ED note, patient receives wound care for chronic RLE cellulitis. Wound care nuse noted worsening redness, weeping, and pain to her RLE. Apparently patient was supposed to be on ciprofloxacin but had a bad reaction so she has been off of it for the past 2 weeks.  The patient and has not had an alternative antibiotic prescribed but reportedly is supposed to be on an antibiotic. Patient also complains of intermittent shortness of breath recently which mostly occurs on exertion. Denies any fever, nausea, vomiting, chest pain, numbness/tingling, weakness, slurred speech, or recent falls.     ED: AFVSS. No leukocytosis. K 5.9, Cr 1.5 (BL ~1.2). EKG, CXR pending. Given IV vanc.      Overview/Hospital Course:  Pt placed in observation for management of worsening RLE cellulitis. Pt received IV vancomycin and ciprofloxacin initially, vancomycin discontinued. Derm consulted for worsening rash, ciprofloxacin discontinued for suspected drug reaction, and the pt was started on doxycycline. Psych consulted for worsening delirium and agitation and pt returned to mental baseline. ID consulted for worsening cellulitis. ID recommends stopping abx and treating with antifungals and steroids. Plan to discharge to SNF when medically stable.       Interval History: Pt seen and examined by  me this morning. VSSAF. NAEON. Pt with improving anion gap metabolic acidosis, ESR, CRP, and lactic elevated (but improving). ID consulted for worsening RLE cellulitis.      Review of Systems   Unable to perform ROS: Mental status change   Objective:     Vital Signs (Most Recent):  Temp: 97.6 °F (36.4 °C) (01/13/23 1533)  Pulse: 71 (01/13/23 1533)  Resp: 18 (01/13/23 1533)  BP: 134/63 (01/13/23 1533)  SpO2: 98 % (01/13/23 1533) Vital Signs (24h Range):  Temp:  [97.4 °F (36.3 °C)-98.2 °F (36.8 °C)] 97.6 °F (36.4 °C)  Pulse:  [63-80] 71  Resp:  [18] 18  SpO2:  [95 %-98 %] 98 %  BP: (115-140)/(58-65) 134/63     Weight: 76.8 kg (169 lb 5 oz)  Body mass index is 28.18 kg/m².    Intake/Output Summary (Last 24 hours) at 1/13/2023 1827  Last data filed at 1/13/2023 1225  Gross per 24 hour   Intake --   Output 350 ml   Net -350 ml        Physical Exam  Vitals and nursing note reviewed.   Constitutional:       General: She is not in acute distress.     Appearance: She is well-developed.   HENT:      Head: Normocephalic and atraumatic.      Mouth/Throat:      Mouth: Mucous membranes are moist.      Pharynx: No oropharyngeal exudate.   Eyes:      Extraocular Movements: Extraocular movements intact.      Conjunctiva/sclera: Conjunctivae normal.      Pupils: Pupils are equal, round, and reactive to light.   Cardiovascular:      Rate and Rhythm: Normal rate and regular rhythm.      Heart sounds: Normal heart sounds.   Pulmonary:      Effort: Pulmonary effort is normal. No respiratory distress.      Breath sounds: Normal breath sounds. No wheezing or rales.   Abdominal:      General: Bowel sounds are normal. There is no distension.      Palpations: Abdomen is soft.      Tenderness: There is no abdominal tenderness.      Comments: Candida intertrigo noted to gluteal folds, groin, lower extremities and feet   Musculoskeletal:         General: No swelling or tenderness. Normal range of motion.      Cervical back: Normal range of  motion and neck supple.   Lymphadenopathy:      Cervical: No cervical adenopathy.   Skin:     General: Skin is warm and dry.      Capillary Refill: Capillary refill takes less than 2 seconds.      Findings: Erythema and rash (upper and lower extremities, trunk, and back (improved from previous) present.   Neurological:      Mental Status: She is alert and oriented to person, place, and time.      Cranial Nerves: No cranial nerve deficit.      Sensory: No sensory deficit.      Coordination: Coordination normal.   Psychiatric:         Behavior: Behavior normal.         Thought Content: Thought content normal.         Judgment: Judgment normal.       Significant Labs: All pertinent labs within the past 24 hours have been reviewed.  CMP:   Recent Labs   Lab 01/12/23  0442 01/12/23  1702 01/13/23  1015   * 142 141   K 3.3* 3.8 3.8   * 111* 107   CO2 15* 21* 23    128* 108   BUN 48* 45* 41*   CREATININE 1.2 1.1 1.2   CALCIUM 8.8 8.7 8.4*   PROT 5.8* 6.2 6.1   ALBUMIN 2.5* 2.7* 2.8*   BILITOT 0.4 0.4 0.5   ALKPHOS 110 121 124   AST 19 20 17   ALT 13 15 17   ANIONGAP 18* 10 11         Significant Imaging: I have reviewed all pertinent imaging results/findings within the past 24 hours.    US Lower Extremity Veins Bilateral  Narrative: EXAMINATION:  US LOWER EXTREMITY VEINS BILATERAL    CLINICAL HISTORY:  Rule out DVT;    TECHNIQUE:  Duplex and color flow Doppler and dynamic compression was performed of the bilateral lower extremity veins was performed.    COMPARISON:  Ultrasound 11/08/2022 09/25/2022 01/02/2020    FINDINGS:  Right thigh veins: The common femoral, femoral, popliteal, upper greater saphenous, and deep femoral veins are patent and free of thrombus. The veins are normally compressible and have normal phasic flow and augmentation response.    Right calf veins: Posterior tibial vein is not visualized due to overlying bandage.  The remaining visualized calf veins are patent.    Left thigh veins:  "The common femoral, femoral, popliteal, upper greater saphenous, and deep femoral veins are patent and free of thrombus. The veins are normally compressible and have normal phasic flow and augmentation response.    Left calf veins: The visualized calf veins are patent.    Miscellaneous: Left Baker's cyst measuring 0.7 x 0.6 x 0.4 cm.  Bilateral lower extremities edema.  Impression: No evidence of deep venous thrombosis in either lower extremity.    Subcentimeter left Baker's cyst.    Electronically signed by resident: Ekaterina Gutierrez  Date:    01/05/2023  Time:    11:58    Electronically signed by: Joesph Chery MD  Date:    01/05/2023  Time:    12:12  X-Ray Chest AP Portable  Narrative: EXAMINATION:  XR CHEST AP PORTABLE    CLINICAL HISTORY:  Provided history is "SOB;  ".    TECHNIQUE:  One view of the chest.    COMPARISON:  09/25/2022.    FINDINGS:  Cardiac wires overlie the chest.  Cardiomediastinal silhouette is mildly enlarged and similar to the prior study.  Atherosclerotic calcifications overlie the aortic arch.  Stable elevation of the left hemidiaphragm.  Coarsened bilateral interstitial lung markings but no confluent area of consolidation.  No large pleural effusion.  No distinct pneumothorax.  Postoperative changes in the left humerus.  Impression: Cardiomegaly and coarsened interstitial lung markings.  No confluent area of consolidation or detrimental change when compared with 09/25/2022.    Electronically signed by: Tye Ferris MD  Date:    01/05/2023  Time:    01:39         Assessment/Plan:      * Cellulitis of right lower extremity  - acute on chronic issue  - afebrile without leukocytosis  - ESR, CRP elevated  - LE US negative for DVT  - s/p IV vanc x3  - cipro discontinued for suspected drug eruption  - continue doxy for gram neg and pseudomonas coverage  - Pain control with scheduled tylenol, prn oxy 5 q6h and oxy 10 mg q6 prn (to be used with dressing changes)    - bowel regimen in place to " prevent opioid induced constipation  - wound care consulted, continue BID dressing changes by nursing   -- ana added to promote wound healing  - elevate extremity  - ID consulted, appreciate recs:  1. RLE erythema despite prolong course of abx therapy. Would monitor off of abx at this time. Start trial of steroids (5 day course) and antifungals  2. Dermatology following, no concerning feature to prompt skin biopsy at this time.   3. Continue local wound care and steroid cream for rash     High anion gap metabolic acidosis  Improving  - Likely secondary to infection   - Improving s/p 500 cc LR   - Continue to monitor on daily labs    Drug eruption  - rash developing over upper lower extremities and abdomen  - nonspecific skin eruption vs medication reaction  - dermatology consulted and recommend d/c cipro & start triamcinolone cream to entire body BID x 14 days    Delirium  - discontinued benadryl and hydroxyzine given delirium overnight   - overnight 01/09-01/10 - patient hitting technician   -- requiring IM Zyprexa x 1 and restraints  - overnight 01/10-01/11 - patient hitting technician despite oral zyprexa yesterday  - psychiatry consulted, will follow recs  - nightly zyprexa decreased to 2.5 mg per psychiatry     Intertrigo  - Continue miconazole cream    Trochanteric bursitis of right hip  - Scheduled tylenol  - Lidocaine jelly  - Voltaren gel     Hyperkalemia  Stage 3b chronic kidney disease  - overnight 01/05 K 6.6 - given calcium gluconate, albuterol, and lokelma  - scheduled lokelma discontinued, potassium slightly low this am  - monitor on tele    JOSHUA (acute kidney injury)  - Creatinine 1.5 on admission, Cr today Estimated Creatinine Clearance: 34.8 mL/min (based on SCr of 1.1 mg/dL). (baseline 1.2)  - JOSHUA resolution, back to baseline Cr 1.2  - resume home lasix and aldactone  - Continue to monitor on daily labs    Overweight (BMI 25.0-29.9)  Body mass index is 28.18 kg/m².   - Morbid obesity complicates  all aspects of disease management from diagnostic modalities to treatment.  - Weight loss encouraged and health benefits explained to patient.    Chronic diastolic heart failure  - Pt complaining of occasional SOB with volume overload  -   - CXR with coarse interstitial lung markings and cardiomegaly   - last echo below  - Continue home lasix  - strict I/Os, daily weights    Results for orders placed during the hospital encounter of 10/07/22  Echo  Interpretation Summary  · The left ventricle is normal in size with concentric remodeling and normal systolic function.  · The estimated ejection fraction is 55-60%.  · Grade III left ventricular diastolic dysfunction.  · Mild mitral regurgitation.  · Normal right ventricular size with mildly reduced right ventricular systolic function.  · Severe tricuspid regurgitation.  · The estimated PA systolic pressure is 52 mmHg. PASP may be under-estimated due to TR severity.  · Elevated central venous pressure (15 mmHg).  · There is pulmonary hypertension.  · Severe left atrial enlargement.    Chronic atrial fibrillation  - no longer on eliquis s/p Watchman implantation    Debility  Gait instability  - Discussed plan of care with patient and daughter at bedside who report significant weakness and debility with acute difficulty performing ADLs over the past few weeks and throughout admission  - PT/OT consulted   -- recommending SNF and shower chair at d/c    Primary hypertension  - JOSHUA resolved, restart home aldactone    Pure hypercholesterolemia  - continue ASA, statin       VTE Risk Mitigation (From admission, onward)         Ordered     enoxaparin injection 40 mg  Daily         01/13/23 1417     IP VTE HIGH RISK PATIENT  Once         01/05/23 0040                Discharge Planning   GILES: 1/17/2023     Code Status: Full Code   Is the patient medically ready for discharge?: No    Reason for patient still in hospital (select all that apply): Patient trending condition,  Treatment, Imaging and Consult recommendations  Discharge Plan A: Skilled Nursing Facility   Discharge Delays:  (Not medically ready for discharge)              Lorena Moore PA-C  Department of Hospital Medicine   [facility]

## 2023-01-15 LAB
ANION GAP SERPL CALC-SCNC: 9 MMOL/L (ref 8–16)
BASOPHILS # BLD AUTO: 0.01 K/UL (ref 0–0.2)
BASOPHILS NFR BLD: 0.2 % (ref 0–1.9)
BUN SERPL-MCNC: 45 MG/DL (ref 8–23)
CALCIUM SERPL-MCNC: 8.5 MG/DL (ref 8.7–10.5)
CHLORIDE SERPL-SCNC: 107 MMOL/L (ref 95–110)
CO2 SERPL-SCNC: 20 MMOL/L (ref 23–29)
CREAT SERPL-MCNC: 1.1 MG/DL (ref 0.5–1.4)
DIFFERENTIAL METHOD: ABNORMAL
EOSINOPHIL # BLD AUTO: 0 K/UL (ref 0–0.5)
EOSINOPHIL NFR BLD: 0.2 % (ref 0–8)
ERYTHROCYTE [DISTWIDTH] IN BLOOD BY AUTOMATED COUNT: 16.4 % (ref 11.5–14.5)
EST. GFR  (NO RACE VARIABLE): 47.7 ML/MIN/1.73 M^2
GLUCOSE SERPL-MCNC: 129 MG/DL (ref 70–110)
HCT VFR BLD AUTO: 26.3 % (ref 37–48.5)
HGB BLD-MCNC: 8.6 G/DL (ref 12–16)
IMM GRANULOCYTES # BLD AUTO: 0.04 K/UL (ref 0–0.04)
IMM GRANULOCYTES NFR BLD AUTO: 0.8 % (ref 0–0.5)
LYMPHOCYTES # BLD AUTO: 0.8 K/UL (ref 1–4.8)
LYMPHOCYTES NFR BLD: 15.2 % (ref 18–48)
MCH RBC QN AUTO: 28.5 PG (ref 27–31)
MCHC RBC AUTO-ENTMCNC: 32.7 G/DL (ref 32–36)
MCV RBC AUTO: 87 FL (ref 82–98)
MONOCYTES # BLD AUTO: 0.5 K/UL (ref 0.3–1)
MONOCYTES NFR BLD: 9.7 % (ref 4–15)
NEUTROPHILS # BLD AUTO: 3.9 K/UL (ref 1.8–7.7)
NEUTROPHILS NFR BLD: 73.9 % (ref 38–73)
NRBC BLD-RTO: 0 /100 WBC
PLATELET # BLD AUTO: 240 K/UL (ref 150–450)
PMV BLD AUTO: 10.5 FL (ref 9.2–12.9)
POCT GLUCOSE: 211 MG/DL (ref 70–110)
POTASSIUM SERPL-SCNC: 4.1 MMOL/L (ref 3.5–5.1)
RBC # BLD AUTO: 3.02 M/UL (ref 4–5.4)
SODIUM SERPL-SCNC: 136 MMOL/L (ref 136–145)
WBC # BLD AUTO: 5.27 K/UL (ref 3.9–12.7)

## 2023-01-15 PROCEDURE — 93010 EKG 12-LEAD: ICD-10-PCS | Mod: HCNC,,, | Performed by: INTERNAL MEDICINE

## 2023-01-15 PROCEDURE — 99233 PR SUBSEQUENT HOSPITAL CARE,LEVL III: ICD-10-PCS | Mod: HCNC,,,

## 2023-01-15 PROCEDURE — 93010 ELECTROCARDIOGRAM REPORT: CPT | Mod: HCNC,,, | Performed by: INTERNAL MEDICINE

## 2023-01-15 PROCEDURE — 11000001 HC ACUTE MED/SURG PRIVATE ROOM: Mod: HCNC

## 2023-01-15 PROCEDURE — 63600175 PHARM REV CODE 636 W HCPCS: Mod: HCNC | Performed by: HOSPITALIST

## 2023-01-15 PROCEDURE — 63600175 PHARM REV CODE 636 W HCPCS: Mod: HCNC | Performed by: PHYSICIAN ASSISTANT

## 2023-01-15 PROCEDURE — 80048 BASIC METABOLIC PNL TOTAL CA: CPT | Mod: HCNC

## 2023-01-15 PROCEDURE — 97116 GAIT TRAINING THERAPY: CPT | Mod: HCNC,CQ

## 2023-01-15 PROCEDURE — 25000003 PHARM REV CODE 250: Mod: HCNC

## 2023-01-15 PROCEDURE — 25000003 PHARM REV CODE 250: Mod: HCNC | Performed by: PHYSICIAN ASSISTANT

## 2023-01-15 PROCEDURE — 93005 ELECTROCARDIOGRAM TRACING: CPT | Mod: HCNC

## 2023-01-15 PROCEDURE — 25000003 PHARM REV CODE 250: Mod: HCNC | Performed by: HOSPITALIST

## 2023-01-15 PROCEDURE — 99233 SBSQ HOSP IP/OBS HIGH 50: CPT | Mod: HCNC,,,

## 2023-01-15 PROCEDURE — 97110 THERAPEUTIC EXERCISES: CPT | Mod: HCNC,CQ

## 2023-01-15 PROCEDURE — 97530 THERAPEUTIC ACTIVITIES: CPT | Mod: HCNC,CQ

## 2023-01-15 PROCEDURE — 85025 COMPLETE CBC W/AUTO DIFF WBC: CPT | Mod: HCNC | Performed by: HOSPITALIST

## 2023-01-15 PROCEDURE — 36415 COLL VENOUS BLD VENIPUNCTURE: CPT | Mod: HCNC

## 2023-01-15 RX ORDER — ACETAMINOPHEN 500 MG
1000 TABLET ORAL EVERY 6 HOURS
Status: DISCONTINUED | OUTPATIENT
Start: 2023-01-15 | End: 2023-01-24 | Stop reason: HOSPADM

## 2023-01-15 RX ADMIN — SENNOSIDES 8.6 MG: 8.6 TABLET, FILM COATED ORAL at 09:01

## 2023-01-15 RX ADMIN — PREDNISONE 20 MG: 20 TABLET ORAL at 09:01

## 2023-01-15 RX ADMIN — TRIAMCINOLONE ACETONIDE: 1 OINTMENT TOPICAL at 09:01

## 2023-01-15 RX ADMIN — MICONAZOLE NITRATE: 20 CREAM TOPICAL at 09:01

## 2023-01-15 RX ADMIN — POLYETHYLENE GLYCOL 3350 17 G: 17 POWDER, FOR SOLUTION ORAL at 09:01

## 2023-01-15 RX ADMIN — FUROSEMIDE 20 MG: 20 TABLET ORAL at 09:01

## 2023-01-15 RX ADMIN — ASPIRIN 81 MG: 81 TABLET, COATED ORAL at 09:01

## 2023-01-15 RX ADMIN — FUROSEMIDE 20 MG: 20 TABLET ORAL at 06:01

## 2023-01-15 RX ADMIN — SILVER SULFADIAZINE: 10 CREAM TOPICAL at 09:01

## 2023-01-15 RX ADMIN — SPIRONOLACTONE 25 MG: 25 TABLET, FILM COATED ORAL at 09:01

## 2023-01-15 RX ADMIN — FLUCONAZOLE 200 MG: 200 TABLET ORAL at 09:01

## 2023-01-15 RX ADMIN — DICLOFENAC SODIUM 2 G: 10 GEL TOPICAL at 09:01

## 2023-01-15 RX ADMIN — OLANZAPINE 2.5 MG: 2.5 TABLET, FILM COATED ORAL at 09:01

## 2023-01-15 RX ADMIN — ENOXAPARIN SODIUM 40 MG: 40 INJECTION SUBCUTANEOUS at 05:01

## 2023-01-15 RX ADMIN — PRAVASTATIN SODIUM 40 MG: 40 TABLET ORAL at 09:01

## 2023-01-15 RX ADMIN — ACETAMINOPHEN 1000 MG: 500 TABLET ORAL at 06:01

## 2023-01-15 RX ADMIN — ACETAMINOPHEN 1000 MG: 500 TABLET ORAL at 09:01

## 2023-01-15 NOTE — PLAN OF CARE
No acute events during shift. VSS. Fall and safety precautions maintained. Bed in lowest locked position. Bed alarm maintained. Call light within reach. POC discussed with patient. Will continue to monitor.     Problem: Adult Inpatient Plan of Care  Goal: Plan of Care Review  1/15/2023 0659 by Sagrario Su LPN  Outcome: Ongoing, Progressing  1/15/2023 0658 by Sagrario Su LPN  Outcome: Ongoing, Progressing  Goal: Patient-Specific Goal (Individualized)  1/15/2023 0659 by Sagrario Su LPN  Outcome: Ongoing, Progressing  1/15/2023 0658 by Sagrario Su LPN  Outcome: Ongoing, Progressing  Goal: Absence of Hospital-Acquired Illness or Injury  1/15/2023 0659 by Sagrario Su LPN  Outcome: Ongoing, Progressing  1/15/2023 0658 by Sagrario Su LPN  Outcome: Ongoing, Progressing  Goal: Optimal Comfort and Wellbeing  1/15/2023 0659 by Sagrario Su LPN  Outcome: Ongoing, Progressing  1/15/2023 0658 by Sagrario Su LPN  Outcome: Ongoing, Progressing  Goal: Readiness for Transition of Care  1/15/2023 0659 by Sagrario Su LPN  Outcome: Ongoing, Progressing  1/15/2023 0658 by Sagrario Su LPN  Outcome: Ongoing, Progressing     Problem: Infection  Goal: Absence of Infection Signs and Symptoms  1/15/2023 0659 by Sagrario Su LPN  Outcome: Ongoing, Progressing  1/15/2023 0658 by Sagrario Su LPN  Outcome: Ongoing, Progressing     Problem: Diabetes Comorbidity  Goal: Blood Glucose Level Within Targeted Range  1/15/2023 0659 by Sagrario Su LPN  Outcome: Ongoing, Progressing  1/15/2023 0658 by Sagrario Su LPN  Outcome: Ongoing, Progressing     Problem: Fluid and Electrolyte Imbalance (Acute Kidney Injury/Impairment)  Goal: Fluid and Electrolyte Balance  1/15/2023 0659 by Sagrario Su LPN  Outcome: Ongoing, Progressing  1/15/2023 0658 by Sagrario Su LPN  Outcome: Ongoing, Progressing     Problem: Oral Intake Inadequate (Acute Kidney Injury/Impairment)  Goal: Optimal Nutrition Intake  1/15/2023 0659 by  Sagrario Su LPN  Outcome: Ongoing, Progressing  1/15/2023 0658 by Sagrario Su LPN  Outcome: Ongoing, Progressing     Problem: Renal Function Impairment (Acute Kidney Injury/Impairment)  Goal: Effective Renal Function  1/15/2023 0659 by Sagrario Su LPN  Outcome: Ongoing, Progressing  1/15/2023 0658 by Sagrario Su LPN  Outcome: Ongoing, Progressing     Problem: Impaired Wound Healing  Goal: Optimal Wound Healing  1/15/2023 0659 by Sagrario Su LPN  Outcome: Ongoing, Progressing  1/15/2023 0658 by Sagrario Su LPN  Outcome: Ongoing, Progressing     Problem: Fall Injury Risk  Goal: Absence of Fall and Fall-Related Injury  1/15/2023 0659 by Sagrario Su LPN  Outcome: Ongoing, Progressing  1/15/2023 0658 by Sagrario Su LPN  Outcome: Ongoing, Progressing     Problem: Skin Injury Risk Increased  Goal: Skin Health and Integrity  1/15/2023 0659 by Sagrario Su LPN  Outcome: Ongoing, Progressing  1/15/2023 0658 by Sagrario Su LPN  Outcome: Ongoing, Progressing     Problem: Restraint, Nonbehavioral (Nonviolent)  Goal: Absence of Harm or Injury  1/15/2023 0659 by Sagrario Su LPN  Outcome: Ongoing, Progressing  1/15/2023 0658 by Sagrario Su LPN  Outcome: Ongoing, Progressing

## 2023-01-15 NOTE — PT/OT/SLP PROGRESS
Physical Therapy Treatment    Patient Name:  Jenniffer Jimenez   MRN:  [id number]    Recommendations:     Discharge Recommendations: nursing facility, skilled  Discharge Equipment Recommendations: shower chair  Barriers to discharge:  increased levelas of assistance required and fall risk    Assessment:     Jenniffer Jimenez is a 89 y.o. female admitted with a medical diagnosis of Cellulitis of right lower extremity.  She presents with the following impairments/functional limitations: weakness, impaired endurance, impaired self care skills, impaired functional mobility, gait instability, impaired balance, decreased coordination, decreased lower extremity function, decreased safety awareness, pain, impaired skin Patient found with HOB elevated and doctor present performing assessment. PTA assisted with sitting patient EOB requiring SBA to complete bed mobility. Patient able to maintain sitting balance while speaking to doctor. Patient then able to progress in out of bed activity tolerance performing transfers to both bedside commode and bedside chair and participated in gait training in room. Patient able to achieve ~15ft with RW with minimum assistance. Patient continues to be strongly motivated to participate in therapy and return to PLOF. Continued therapy would benefit patient in progressing current functional mobility levels. .    Rehab Prognosis: Good; patient would benefit from acute skilled PT services to address these deficits and reach maximum level of function.    Recent Surgery: * No surgery found *      Plan:     During this hospitalization, patient to be seen 3 x/week to address the identified rehab impairments via gait training, therapeutic activities, therapeutic exercises, neuromuscular re-education and progress toward the following goals:    Plan of Care Expires:  02/08/23    Subjective     Chief Complaint: leg pain increasing with activity  Patient/Family Comments/goals: to get  stronger  Pain/Comfort:  Pain Rating 1:  (unrated pain increasing with activity)  Location - Side 1: Right  Location - Orientation 1: generalized  Location 1: leg  Pain Addressed 1: Reposition, Distraction, Cessation of Activity  Pain Rating Post-Intervention 1: 0/10      Objective:     Communicated with nurse prior to session.  Patient found HOB elevated with  (no active lines) upon PT entry to room.     General Precautions: Standard, fall  Orthopedic Precautions: N/A  Braces: N/A  Respiratory Status: Room air     Functional Mobility:  Bed Mobility:     Scooting: stand by assistance  Supine to Sit: stand by assistance  Transfers:     Sit to Stand:  minimum assistance with rolling walker  Bed to Chair: minimum assistance and moderate assistance with  rolling walker  using  Step Transfer  Increased levels of assistance required to perform transfer secondary to decreased safety awareness demonstrated by patient attempting to prematurely sit in chair without ensure it was behind her.   Toilet Transfer: minimum assistance with  rolling walker  using  Step Transfer  Gait: ~6steps to bedside commode + ~15ft + ~6steps to bedside chair with seated rest between each trila.   Decreased marco  Verbal cues to promote erect posture for visual scanning of environment to improve safety awareness  Verbal cues to promote increase in step length  Increased time taken to navigate turns.  Step to gait pattern   Balance:   static sitting EOB SBA  Dynamic sitting balance CGA  Static standing CGA-min A      AM-PAC 6 CLICK MOBILITY  Turning over in bed (including adjusting bedclothes, sheets and blankets)?: 3  Sitting down on and standing up from a chair with arms (e.g., wheelchair, bedside commode, etc.): 3  Moving from lying on back to sitting on the side of the bed?: 3  Moving to and from a bed to a chair (including a wheelchair)?: 2  Need to walk in hospital room?: 2  Climbing 3-5 steps with a railing?: 1  Basic Mobility Total Score:  14       Treatment & Education:  Educated patient on BLE exercises to perform while seated up in chair to improve strength for functional mobility improvement. Patient able to complete with verbal cues on technique.     Daughter arrived at conclusion of session, increased time to answer all of family's questions/concerns about care including patient's progress this date.  Educated patient on PTA role in established POC  Educated patient on using call bell to request assistance with functional mobility  All questions within PTA scope of practice answered.       Patient left up in chair with all lines intact, call button in reach, nurse notified, and daughter present..    GOALS:   Multidisciplinary Problems       Physical Therapy Goals          Problem: Physical Therapy    Goal Priority Disciplines Outcome Goal Variances Interventions   Physical Therapy Goal     PT, PT/OT Ongoing, Progressing     Description: Goals to be met by: 23     Patient will increase functional independence with mobility by performin. Sit to stand transfer with Stand-by Assistance  2. Gait  x 100 feet with Stand-by Assistance using LRAD.   3. Stand for 6 minutes with Stand-by Assistance using LRAD while performing dynamic tasks.                         Time Tracking:     PT Received On: 01/15/23  PT Start Time: 1015     PT Stop Time: 1109  PT Total Time (min): 54 min     Billable Minutes: Gait Training 20, Therapeutic Activity 23, and Therapeutic Exercise 10    Treatment Type: Treatment  PT/PTA: PTA     PTA Visit Number: 1     01/15/2023

## 2023-01-16 LAB
BACTERIA SPEC AEROBE CULT: ABNORMAL
POCT GLUCOSE: 253 MG/DL (ref 70–110)

## 2023-01-16 PROCEDURE — 25000003 PHARM REV CODE 250: Mod: HCNC

## 2023-01-16 PROCEDURE — 99233 PR SUBSEQUENT HOSPITAL CARE,LEVL III: ICD-10-PCS | Mod: HCNC,,,

## 2023-01-16 PROCEDURE — 63600175 PHARM REV CODE 636 W HCPCS: Mod: HCNC | Performed by: HOSPITALIST

## 2023-01-16 PROCEDURE — 25000003 PHARM REV CODE 250: Mod: HCNC | Performed by: PHYSICIAN ASSISTANT

## 2023-01-16 PROCEDURE — 93010 ELECTROCARDIOGRAM REPORT: CPT | Mod: HCNC,,, | Performed by: INTERNAL MEDICINE

## 2023-01-16 PROCEDURE — 93010 EKG 12-LEAD: ICD-10-PCS | Mod: HCNC,,, | Performed by: INTERNAL MEDICINE

## 2023-01-16 PROCEDURE — 11000001 HC ACUTE MED/SURG PRIVATE ROOM: Mod: HCNC

## 2023-01-16 PROCEDURE — 25000003 PHARM REV CODE 250: Mod: HCNC | Performed by: HOSPITALIST

## 2023-01-16 PROCEDURE — 93005 ELECTROCARDIOGRAM TRACING: CPT | Mod: HCNC

## 2023-01-16 PROCEDURE — 99233 SBSQ HOSP IP/OBS HIGH 50: CPT | Mod: HCNC,,,

## 2023-01-16 PROCEDURE — 63600175 PHARM REV CODE 636 W HCPCS: Mod: HCNC | Performed by: PHYSICIAN ASSISTANT

## 2023-01-16 RX ORDER — SILVER SULFADIAZINE 10 G/1000G
CREAM TOPICAL
Start: 2023-01-16

## 2023-01-16 RX ORDER — TRIAMCINOLONE ACETONIDE 1 MG/G
OINTMENT TOPICAL
Start: 2023-01-16 | End: 2023-01-16 | Stop reason: SDUPTHER

## 2023-01-16 RX ORDER — MUPIROCIN 20 MG/G
OINTMENT TOPICAL 2 TIMES DAILY
Status: CANCELLED | OUTPATIENT
Start: 2023-01-16 | End: 2023-01-21

## 2023-01-16 RX ORDER — TRIAMCINOLONE ACETONIDE 1 MG/G
OINTMENT TOPICAL
Start: 2023-01-16 | End: 2023-03-13

## 2023-01-16 RX ORDER — OLANZAPINE 2.5 MG/1
2.5 TABLET ORAL EVERY 8 HOURS PRN
Status: ON HOLD
Start: 2023-01-16 | End: 2023-01-29 | Stop reason: HOSPADM

## 2023-01-16 RX ORDER — OLANZAPINE 2.5 MG/1
2.5 TABLET ORAL NIGHTLY
Qty: 30 TABLET | Refills: 11 | Status: ON HOLD
Start: 2023-01-16 | End: 2023-01-29 | Stop reason: HOSPADM

## 2023-01-16 RX ADMIN — ASPIRIN 81 MG: 81 TABLET, COATED ORAL at 10:01

## 2023-01-16 RX ADMIN — ACETAMINOPHEN 1000 MG: 500 TABLET ORAL at 01:01

## 2023-01-16 RX ADMIN — FLUCONAZOLE 200 MG: 200 TABLET ORAL at 10:01

## 2023-01-16 RX ADMIN — FUROSEMIDE 20 MG: 20 TABLET ORAL at 10:01

## 2023-01-16 RX ADMIN — MICONAZOLE NITRATE: 20 CREAM TOPICAL at 10:01

## 2023-01-16 RX ADMIN — ACETAMINOPHEN 1000 MG: 500 TABLET ORAL at 05:01

## 2023-01-16 RX ADMIN — SPIRONOLACTONE 25 MG: 25 TABLET, FILM COATED ORAL at 10:01

## 2023-01-16 RX ADMIN — POLYETHYLENE GLYCOL 3350 17 G: 17 POWDER, FOR SOLUTION ORAL at 10:01

## 2023-01-16 RX ADMIN — DICLOFENAC SODIUM 2 G: 10 GEL TOPICAL at 10:01

## 2023-01-16 RX ADMIN — TRIAMCINOLONE ACETONIDE: 1 OINTMENT TOPICAL at 10:01

## 2023-01-16 RX ADMIN — OLANZAPINE 2.5 MG: 2.5 TABLET, FILM COATED ORAL at 10:01

## 2023-01-16 RX ADMIN — FUROSEMIDE 20 MG: 20 TABLET ORAL at 06:01

## 2023-01-16 RX ADMIN — TRIAMCINOLONE ACETONIDE: 1 OINTMENT TOPICAL at 09:01

## 2023-01-16 RX ADMIN — BISACODYL 10 MG: 10 SUPPOSITORY RECTAL at 06:01

## 2023-01-16 RX ADMIN — SILVER SULFADIAZINE: 10 CREAM TOPICAL at 10:01

## 2023-01-16 RX ADMIN — ACETAMINOPHEN 1000 MG: 500 TABLET ORAL at 12:01

## 2023-01-16 RX ADMIN — PREDNISONE 20 MG: 20 TABLET ORAL at 10:01

## 2023-01-16 RX ADMIN — SENNOSIDES 8.6 MG: 8.6 TABLET, FILM COATED ORAL at 10:01

## 2023-01-16 RX ADMIN — ENOXAPARIN SODIUM 40 MG: 40 INJECTION SUBCUTANEOUS at 04:01

## 2023-01-16 RX ADMIN — ACETAMINOPHEN 1000 MG: 500 TABLET ORAL at 06:01

## 2023-01-16 RX ADMIN — PRAVASTATIN SODIUM 40 MG: 40 TABLET ORAL at 10:01

## 2023-01-16 NOTE — PROGRESS NOTES
[facility] - [facility]ation [address] [place] [address] Medicine  Progress Note    Patient Name: Jenniffer Jimenez  MRN: [id number]  Patient Class: IP- Inpatient   Admission Date: 1/4/2023  Length of Stay: 7 days  Attending Physician: Nely Ruiz MD  Primary Care Provider: Rubi Young DO        Subjective:     Principal Problem:Cellulitis of right lower extremity        HPI:  Jenniffer Jimenez is a 89 y.o. female with a PMHx of HLD, HTN, AF s/p watchman, chronic cellulitis/ wound of her right lower extremity who [place] [facility] [place] redness and pain to her right lwoer extremity. Patient is a poor historian and has difficulty hearing. Per ED note, patient receives wound care for chronic RLE cellulitis. Wound care nuse noted worsening redness, weeping, and pain to her RLE. Apparently patient was supposed to be on ciprofloxacin but had a bad reaction so she has been off of it for the past 2 weeks.  The patient and has not had an alternative antibiotic prescribed but reportedly is supposed to be on an antibiotic. Patient also complains of intermittent shortness of breath recently which mostly occurs on exertion. Denies any fever, nausea, vomiting, chest pain, numbness/tingling, weakness, slurred speech, or recent falls.     ED: AFVSS. No leukocytosis. K 5.9, Cr 1.5 (BL ~1.2). EKG, CXR pending. Given IV vanc.      Overview/Hospital Course:  Pt placed in observation for management of worsening RLE cellulitis. Pt received IV vancomycin and ciprofloxacin initially, vancomycin discontinued. Derm consulted for worsening rash, ciprofloxacin discontinued for suspected drug reaction, and the pt was started on doxycycline. Psych consulted for worsening delirium and agitation and pt returned to mental baseline. ID consulted for worsening cellulitis. ID recommends stopping abx and treating with antifungals and steroids. Plan to discharge to SNF when medically stable.       Interval History: Pt seen and examined by  me this morning. Mildly hypotensive otherwise VSSAF. Sating 96% on RA. NAEON. Pt reports pain well controlled on current regimen, diffuse body rash improving. Plan to continue steroids and antifungals. POC discussed, all questions answered.     Review of Systems   Constitutional:  Negative for appetite change, chills and fever.   HENT:  Negative for congestion, trouble swallowing and voice change.    Eyes:  Negative for photophobia and visual disturbance.   Respiratory:  Negative for cough, chest tightness and wheezing.    Cardiovascular:  Positive for leg swelling. Negative for chest pain and palpitations.   Gastrointestinal:  Negative for abdominal pain, constipation, diarrhea, nausea and vomiting.   Endocrine: Negative for polyphagia and polyuria.   Genitourinary:  Negative for decreased urine volume, difficulty urinating, dysuria, flank pain and hematuria.   Musculoskeletal:  Positive for arthralgias (R hip). Negative for back pain and gait problem.   Skin:  Positive for color change and wound.   Neurological:  Negative for dizziness, seizures, syncope, weakness, numbness and headaches.   Psychiatric/Behavioral:  Negative for agitation, behavioral problems and confusion.    Objective:     Vital Signs (Most Recent):  Temp: 97.7 °F (36.5 °C) (01/16/23 0749)  Pulse: (!) 55 (01/16/23 0749)  Resp: 17 (01/16/23 0749)  BP: (!) 110/51 (01/16/23 0749)  SpO2: (!) 93 % (01/16/23 0749) Vital Signs (24h Range):  Temp:  [97.5 °F (36.4 °C)-98.5 °F (36.9 °C)] 97.7 °F (36.5 °C)  Pulse:  [55-87] 55  Resp:  [17-18] 17  SpO2:  [93 %-97 %] 93 %  BP: (105-156)/(51-67) 110/51     Weight: 76.8 kg (169 lb 5 oz)  Body mass index is 28.18 kg/m².  No intake or output data in the 24 hours ending 01/16/23 1541     Physical Exam  Vitals and nursing note reviewed.   Constitutional:       General: She is not in acute distress.     Appearance: She is well-developed.   HENT:      Head: Normocephalic and atraumatic.      Mouth/Throat:      Mouth:  Mucous membranes are moist.      Pharynx: No oropharyngeal exudate.   Eyes:      Extraocular Movements: Extraocular movements intact.      Conjunctiva/sclera: Conjunctivae normal.      Pupils: Pupils are equal, round, and reactive to light.   Cardiovascular:      Rate and Rhythm: Normal rate and regular rhythm.      Heart sounds: Normal heart sounds.   Pulmonary:      Effort: Pulmonary effort is normal. No respiratory distress.      Breath sounds: Normal breath sounds. No wheezing or rales.   Abdominal:      General: Bowel sounds are normal. There is no distension.      Palpations: Abdomen is soft.      Tenderness: There is no abdominal tenderness.      Comments: Candida intertrigo noted to gluteal folds, groin, lower extremities and feet   Musculoskeletal:         General: No swelling or tenderness. Normal range of motion.      Cervical back: Normal range of motion and neck supple.   Lymphadenopathy:      Cervical: No cervical adenopathy.   Skin:     General: Skin is warm and dry.      Capillary Refill: Capillary refill takes less than 2 seconds.      Findings: Erythema and rash (upper and lower extremities, trunk, and back (improved from previous) present.   Neurological:      Mental Status: She is alert and oriented to person, place, and time.      Cranial Nerves: No cranial nerve deficit.      Sensory: No sensory deficit.      Coordination: Coordination normal.   Psychiatric:         Behavior: Behavior normal.         Thought Content: Thought content normal.         Judgment: Judgment normal.                           Significant Labs: All pertinent labs within the past 24 hours have been reviewed.  CMP:   Recent Labs   Lab 01/15/23  0743      K 4.1      CO2 20*   *   BUN 45*   CREATININE 1.1   CALCIUM 8.5*   ANIONGAP 9         Significant Imaging: I have reviewed all pertinent imaging results/findings within the past 24 hours.    X-Ray Ankle Complete Right  Narrative: EXAMINATION:  XR ANKLE  COMPLETE 3 VIEW RIGHT    CLINICAL HISTORY:  r/o osteomyelitis;    TECHNIQUE:  AP, lateral, and oblique images of the right ankle were performed.    COMPARISON:  January 1, 2020    FINDINGS:  There is diffuse osseous demineralization.  No definite acute fracture or bony destructive process is seen.  There is mild soft tissue swelling.  There are vascular calcifications in the soft tissues.  There are calcaneal spurs.  Calcification inferior to the lateral malleolus seen on the prior examination is no longer identified.  Alignment is preserved.  Impression: As above.    Electronically signed by: Madeline Wang MD  Date:    01/14/2023  Time:    07:31  X-Ray Tibia Fibula 2 View Right  Narrative: EXAMINATION:  XR TIBIA FIBULA 2 VIEW RIGHT    CLINICAL HISTORY:  r/o osteomyelitis;    TECHNIQUE:  AP and lateral views of the right tibia and fibula were performed.    COMPARISON:  October 7, 2022    FINDINGS:  No definite acute fracture or bony destructive process is seen.  Alignment is preserved.  As previously there are mild degenerative changes at the knee.  There are vascular calcifications in the soft tissues.  Impression: No detrimental change since October 7, 2022    Electronically signed by: Madeline Wang MD  Date:    01/14/2023  Time:    07:25         Assessment/Plan:      * Cellulitis of right lower extremity  - acute on chronic issue  - afebrile without leukocytosis  - ESR, CRP elevated, lactic downtrending  - LE US negative for DVT  - s/p IV vanc x3  - cipro discontinued for suspected drug eruption  - continue doxy for gram neg and pseudomonas coverage  - Pain control with scheduled tylenol, prn oxy 5 q6h and oxy 10 mg q6 prn (to be used with dressing changes)    - bowel regimen in place to prevent opioid induced constipation  - wound care consulted, continue BID dressing changes by nursing   -- ana added to promote wound healing  - elevate extremity  - ID consulted, appreciate recs:  1. RLE erythema despite  prolong course of abx therapy. Would monitor off of abx at this time. Start trial of steroids (5 day course) and antifungals  2. Dermatology following, no concerning feature to prompt skin biopsy at this time.   3. Continue local wound care and steroid cream for rash   - Wound cultures + few MRSA - discussed with Agnes Mcneil PA-C with ID who recommended continuing steroids and antifungals as RLE continues to improve    High anion gap metabolic acidosis  Resolved  - Likely secondary to infection   - Improving s/p 500 cc LR   - Continue to monitor on daily labs    Drug eruption  - rash developing over upper lower extremities and abdomen  - nonspecific skin eruption vs medication reaction  - dermatology consulted and recommend d/c cipro & start triamcinolone cream to entire body BID x 14 days    Delirium  - discontinued benadryl and hydroxyzine given delirium overnight   - overnight 01/09-01/10 - patient hitting technician   -- requiring IM Zyprexa x 1 and restraints  - overnight 01/10-01/11 - patient hitting technician despite oral zyprexa yesterday  - psychiatry consulted, will follow recs  - nightly zyprexa decreased to 2.5 mg per psychiatry     Intertrigo  - Continue miconazole cream    Trochanteric bursitis of right hip  - Scheduled tylenol  - Lidocaine jelly  - Voltaren gel     Hyperkalemia  Stage 3b chronic kidney disease  - overnight 01/05 K 6.6 - given calcium gluconate, albuterol, and lokelma  - scheduled lokelma discontinued, potassium slightly low this am  - monitor on tele    JOSHUA (acute kidney injury)  - Creatinine 1.5 on admission, Cr today Estimated Creatinine Clearance: 34.8 mL/min (based on SCr of 1.1 mg/dL). (baseline 1.2)  - JOSHUA resolution, back to baseline Cr 1.2  - resume home lasix and aldactone  - Continue to monitor on daily labs    Overweight (BMI 25.0-29.9)  Body mass index is 28.18 kg/m².   - Morbid obesity complicates all aspects of disease management from diagnostic modalities to  treatment.  - Weight loss encouraged and health benefits explained to patient.    Chronic diastolic heart failure  - Pt complaining of occasional SOB with volume overload  -   - CXR with coarse interstitial lung markings and cardiomegaly   - last echo below  - Continue home lasix  - strict I/Os, daily weights    Results for orders placed during the hospital encounter of 10/07/22  Echo  Interpretation Summary  · The left ventricle is normal in size with concentric remodeling and normal systolic function.  · The estimated ejection fraction is 55-60%.  · Grade III left ventricular diastolic dysfunction.  · Mild mitral regurgitation.  · Normal right ventricular size with mildly reduced right ventricular systolic function.  · Severe tricuspid regurgitation.  · The estimated PA systolic pressure is 52 mmHg. PASP may be under-estimated due to TR severity.  · Elevated central venous pressure (15 mmHg).  · There is pulmonary hypertension.  · Severe left atrial enlargement.    Chronic atrial fibrillation  - no longer on eliquis s/p Watchman implantation    Debility  Gait instability  - Discussed plan of care with patient and daughter at bedside who report significant weakness and debility with acute difficulty performing ADLs over the past few weeks and throughout admission  - PT/OT consulted   -- recommending SNF and shower chair at d/c    Primary hypertension  - JOSHUA resolved, restart home aldactone    Pure hypercholesterolemia  - continue ASA, statin       VTE Risk Mitigation (From admission, onward)         Ordered     enoxaparin injection 40 mg  Daily         01/13/23 1417     IP VTE HIGH RISK PATIENT  Once         01/05/23 0040                Discharge Planning   GILES: 1/18/2023     Code Status: Full Code   Is the patient medically ready for discharge?: No    Reason for patient still in hospital (select all that apply): Patient trending condition, Treatment, Consult recommendations and Pending  disposition  Discharge Plan A: Skilled Nursing Facility   Discharge Delays:  (Not medically ready for discharge)              Lorena Moore PA-C  Department of Hospital Medicine   [facility] - Observation 11H

## 2023-01-16 NOTE — ASSESSMENT & PLAN NOTE
Resolved  - Likely secondary to infection   - Improving s/p 500 cc LR   - Continue to monitor on daily labs

## 2023-01-16 NOTE — PROGRESS NOTES
[facility] [address] [place] [address] Medicine  Progress Note    Patient Name: Jenniffer Jimenez  MRN: [id number]  Patient Class: IP- Inpatient   Admission Date: 1/4/2023  Length of Stay: 6 days  Attending Physician: Nely Ruiz MD  Primary Care Provider: Rubi Young DO        Subjective:     Principal Problem:Cellulitis of right lower extremity        HPI:  Jenniffer Jimenez is a 89 y.o. female with a PMHx of HLD, HTN, AF s/p watchman, chronic cellulitis/ wound of her right lower extremity who presents to [facility] [place] for evaluation of worsening redness and pain to her right lwoer extremity. Patient is a poor historian and has difficulty hearing. Per ED note, patient receives wound care for chronic RLE cellulitis. Wound care nuse noted worsening redness, weeping, and pain to her RLE. Apparently patient was supposed to be on ciprofloxacin but had a bad reaction so she has been off of it for the past 2 weeks.  The patient and has not had an alternative antibiotic prescribed but reportedly is supposed to be on an antibiotic. Patient also complains of intermittent shortness of breath recently which mostly occurs on exertion. Denies any fever, nausea, vomiting, chest pain, numbness/tingling, weakness, slurred speech, or recent falls.     ED: AFVSS. No leukocytosis. K 5.9, Cr 1.5 (BL ~1.2). EKG, CXR pending. Given IV vanc.      Overview/Hospital Course:  Pt placed in observation for management of worsening RLE cellulitis. Pt received IV vancomycin and ciprofloxacin initially, vancomycin discontinued. Derm consulted for worsening rash, ciprofloxacin discontinued for suspected drug reaction, and the pt was started on doxycycline. Psych consulted for worsening delirium and agitation and pt returned to mental baseline. ID consulted for worsening cellulitis. ID recommends stopping abx and treating with antifungals and steroids. Plan to discharge to SNF when medically stable.       Interval History: Pt seen and examined by  me this morning. FRANK. KATLYNARIELON. Pt reports pain well controlled on current regimen. Plan to continue steroids and antifungals. POC discussed, all questions answered.     Review of Systems   Constitutional:  Negative for appetite change, chills and fever.   HENT:  Negative for congestion, trouble swallowing and voice change.    Eyes:  Negative for photophobia and visual disturbance.   Respiratory:  Negative for cough, chest tightness and wheezing.    Cardiovascular:  Positive for leg swelling. Negative for chest pain and palpitations.   Gastrointestinal:  Negative for abdominal pain, constipation, diarrhea, nausea and vomiting.   Endocrine: Negative for polyphagia and polyuria.   Genitourinary:  Negative for decreased urine volume, difficulty urinating, dysuria, flank pain and hematuria.   Musculoskeletal:  Positive for arthralgias (R hip). Negative for back pain and gait problem.   Skin:  Positive for color change and wound.   Neurological:  Negative for dizziness, seizures, syncope, weakness, numbness and headaches.   Psychiatric/Behavioral:  Negative for agitation, behavioral problems and confusion.    Objective:     Vital Signs (Most Recent):  Temp: 97.8 °F (36.6 °C) (01/15/23 1556)  Pulse: 66 (01/15/23 1556)  Resp: 18 (01/15/23 1556)  BP: (!) 140/60 (01/15/23 1556)  SpO2: (!) 93 % (01/15/23 1556) Vital Signs (24h Range):  Temp:  [96.3 °F (35.7 °C)-98 °F (36.7 °C)] 97.8 °F (36.6 °C)  Pulse:  [65-77] 66  Resp:  [16-18] 18  SpO2:  [93 %-99 %] 93 %  BP: (114-140)/(56-60) 140/60     Weight: 76.8 kg (169 lb 5 oz)  Body mass index is 28.18 kg/m².  No intake or output data in the 24 hours ending 01/15/23 1900     Physical Exam  Vitals and nursing note reviewed.   Constitutional:       General: She is not in acute distress.     Appearance: She is well-developed.   HENT:      Head: Normocephalic and atraumatic.      Mouth/Throat:      Mouth: Mucous membranes are moist.      Pharynx: No oropharyngeal exudate.   Eyes:       Extraocular Movements: Extraocular movements intact.      Conjunctiva/sclera: Conjunctivae normal.      Pupils: Pupils are equal, round, and reactive to light.   Cardiovascular:      Rate and Rhythm: Normal rate and regular rhythm.      Heart sounds: Normal heart sounds.   Pulmonary:      Effort: Pulmonary effort is normal. No respiratory distress.      Breath sounds: Normal breath sounds. No wheezing or rales.   Abdominal:      General: Bowel sounds are normal. There is no distension.      Palpations: Abdomen is soft.      Tenderness: There is no abdominal tenderness.      Comments: Candida intertrigo noted to gluteal folds, groin, lower extremities and feet   Musculoskeletal:         General: No swelling or tenderness. Normal range of motion.      Cervical back: Normal range of motion and neck supple.   Lymphadenopathy:      Cervical: No cervical adenopathy.   Skin:     General: Skin is warm and dry.      Capillary Refill: Capillary refill takes less than 2 seconds.      Findings: Erythema and rash (upper and lower extremities, trunk, and back (improved from previous) present.   Neurological:      Mental Status: She is alert and oriented to person, place, and time.      Cranial Nerves: No cranial nerve deficit.      Sensory: No sensory deficit.      Coordination: Coordination normal.   Psychiatric:         Behavior: Behavior normal.         Thought Content: Thought content normal.         Judgment: Judgment normal.                 Significant Labs: All pertinent labs within the past 24 hours have been reviewed.  CMP:   Recent Labs   Lab 01/14/23  0938 01/15/23  0743    136   K 3.6 4.1    107   CO2 23 20*   * 129*   BUN 41* 45*   CREATININE 1.1 1.1   CALCIUM 8.4* 8.5*   ANIONGAP 13 9         Significant Imaging: I have reviewed all pertinent imaging results/findings within the past 24 hours.    X-Ray Ankle Complete Right  Narrative: EXAMINATION:  XR ANKLE COMPLETE 3 VIEW RIGHT    CLINICAL  HISTORY:  r/o osteomyelitis;    TECHNIQUE:  AP, lateral, and oblique images of the right ankle were performed.    COMPARISON:  January 1, 2020    FINDINGS:  There is diffuse osseous demineralization.  No definite acute fracture or bony destructive process is seen.  There is mild soft tissue swelling.  There are vascular calcifications in the soft tissues.  There are calcaneal spurs.  Calcification inferior to the lateral malleolus seen on the prior examination is no longer identified.  Alignment is preserved.  Impression: As above.    Electronically signed by: Madeline Wang MD  Date:    01/14/2023  Time:    07:31  X-Ray Tibia Fibula 2 View Right  Narrative: EXAMINATION:  XR TIBIA FIBULA 2 VIEW RIGHT    CLINICAL HISTORY:  r/o osteomyelitis;    TECHNIQUE:  AP and lateral views of the right tibia and fibula were performed.    COMPARISON:  October 7, 2022    FINDINGS:  No definite acute fracture or bony destructive process is seen.  Alignment is preserved.  As previously there are mild degenerative changes at the knee.  There are vascular calcifications in the soft tissues.  Impression: No detrimental change since October 7, 2022    Electronically signed by: Madeline Wang MD  Date:    01/14/2023  Time:    07:25         Assessment/Plan:      * Cellulitis of right lower extremity  - acute on chronic issue  - afebrile without leukocytosis  - ESR, CRP elevated, lactic downtrending  - LE US negative for DVT  - s/p IV vanc x3  - cipro discontinued for suspected drug eruption  - continue doxy for gram neg and pseudomonas coverage  - Pain control with scheduled tylenol, prn oxy 5 q6h and oxy 10 mg q6 prn (to be used with dressing changes)    - bowel regimen in place to prevent opioid induced constipation  - wound care consulted, continue BID dressing changes by nursing   -- ana added to promote wound healing  - elevate extremity  - ID consulted, appreciate recs:  1. RLE erythema despite prolong course of abx therapy.  Would monitor off of abx at this time. Start trial of steroids (5 day course) and antifungals  2. Dermatology following, no concerning feature to prompt skin biopsy at this time.   3. Continue local wound care and steroid cream for rash     High anion gap metabolic acidosis  Resolved  - Likely secondary to infection   - Improving s/p 500 cc LR   - Continue to monitor on daily labs    Drug eruption  - rash developing over upper lower extremities and abdomen  - nonspecific skin eruption vs medication reaction  - dermatology consulted and recommend d/c cipro & start triamcinolone cream to entire body BID x 14 days    Delirium  - discontinued benadryl and hydroxyzine given delirium overnight   - overnight 01/09-01/10 - patient hitting technician   -- requiring IM Zyprexa x 1 and restraints  - overnight 01/10-01/11 - patient hitting technician despite oral zyprexa yesterday  - psychiatry consulted, will follow recs  - nightly zyprexa decreased to 2.5 mg per psychiatry     Intertrigo  - Continue miconazole cream    Trochanteric bursitis of right hip  - Scheduled tylenol  - Lidocaine jelly  - Voltaren gel     Hyperkalemia  Stage 3b chronic kidney disease  - overnight 01/05 K 6.6 - given calcium gluconate, albuterol, and lokelma  - scheduled lokelma discontinued, potassium slightly low this am  - monitor on tele    JOSHUA (acute kidney injury)  - Creatinine 1.5 on admission, Cr today Estimated Creatinine Clearance: 34.8 mL/min (based on SCr of 1.1 mg/dL). (baseline 1.2)  - JOSHUA resolution, back to baseline Cr 1.2  - resume home lasix and aldactone  - Continue to monitor on daily labs    Overweight (BMI 25.0-29.9)  Body mass index is 28.18 kg/m².   - Morbid obesity complicates all aspects of disease management from diagnostic modalities to treatment.  - Weight loss encouraged and health benefits explained to patient.    Chronic diastolic heart failure  - Pt complaining of occasional SOB with volume overload  -   - CXR  with coarse interstitial lung markings and cardiomegaly   - last echo below  - Continue home lasix  - strict I/Os, daily weights    Results for orders placed during the hospital encounter of 10/07/22  Echo  Interpretation Summary  · The left ventricle is normal in size with concentric remodeling and normal systolic function.  · The estimated ejection fraction is 55-60%.  · Grade III left ventricular diastolic dysfunction.  · Mild mitral regurgitation.  · Normal right ventricular size with mildly reduced right ventricular systolic function.  · Severe tricuspid regurgitation.  · The estimated PA systolic pressure is 52 mmHg. PASP may be under-estimated due to TR severity.  · Elevated central venous pressure (15 mmHg).  · There is pulmonary hypertension.  · Severe left atrial enlargement.    Chronic atrial fibrillation  - no longer on eliquis s/p Watchman implantation    Debility  Gait instability  - Discussed plan of care with patient and daughter at bedside who report significant weakness and debility with acute difficulty performing ADLs over the past few weeks and throughout admission  - PT/OT consulted   -- recommending SNF and shower chair at d/c    Primary hypertension  - JOSHUA resolved, restart home aldactone    Pure hypercholesterolemia  - continue ASA, statin       VTE Risk Mitigation (From admission, onward)         Ordered     enoxaparin injection 40 mg  Daily         01/13/23 1417     IP VTE HIGH RISK PATIENT  Once         01/05/23 0040                Discharge Planning   GILES: 1/17/2023     Code Status: Full Code   Is the patient medically ready for discharge?: No    Reason for patient still in hospital (select all that apply): Patient trending condition, Treatment and Consult recommendations  Discharge Plan A: Skilled Nursing Facility   Discharge Delays:  (Not medically ready for discharge)              JUSTINO ShinC  Department of Hospital Medicine   [facility] - [facility]ation 11[facility]

## 2023-01-16 NOTE — SUBJECTIVE & OBJECTIVE
Interval History: Pt seen and examined by me this morning. Mildly hypotensive otherwise VSSAF. Sating 96% on RA. NAEON. Pt reports pain well controlled on current regimen, diffuse body rash improving. Plan to continue steroids and antifungals. POC discussed, all questions answered.     Review of Systems   Constitutional:  Negative for appetite change, chills and fever.   HENT:  Negative for congestion, trouble swallowing and voice change.    Eyes:  Negative for photophobia and visual disturbance.   Respiratory:  Negative for cough, chest tightness and wheezing.    Cardiovascular:  Positive for leg swelling. Negative for chest pain and palpitations.   Gastrointestinal:  Negative for abdominal pain, constipation, diarrhea, nausea and vomiting.   Endocrine: Negative for polyphagia and polyuria.   Genitourinary:  Negative for decreased urine volume, difficulty urinating, dysuria, flank pain and hematuria.   Musculoskeletal:  Positive for arthralgias (R hip). Negative for back pain and gait problem.   Skin:  Positive for color change and wound.   Neurological:  Negative for dizziness, seizures, syncope, weakness, numbness and headaches.   Psychiatric/Behavioral:  Negative for agitation, behavioral problems and confusion.    Objective:     Vital Signs (Most Recent):  Temp: 97.7 °F (36.5 °C) (01/16/23 0749)  Pulse: (!) 55 (01/16/23 0749)  Resp: 17 (01/16/23 0749)  BP: (!) 110/51 (01/16/23 0749)  SpO2: (!) 93 % (01/16/23 0749) Vital Signs (24h Range):  Temp:  [97.5 °F (36.4 °C)-98.5 °F (36.9 °C)] 97.7 °F (36.5 °C)  Pulse:  [55-87] 55  Resp:  [17-18] 17  SpO2:  [93 %-97 %] 93 %  BP: (105-156)/(51-67) 110/51     Weight: 76.8 kg (169 lb 5 oz)  Body mass index is 28.18 kg/m².  No intake or output data in the 24 hours ending 01/16/23 1541     Physical Exam  Vitals and nursing note reviewed.   Constitutional:       General: She is not in acute distress.     Appearance: She is well-developed.   HENT:      Head: Normocephalic and  atraumatic.      Mouth/Throat:      Mouth: Mucous membranes are moist.      Pharynx: No oropharyngeal exudate.   Eyes:      Extraocular Movements: Extraocular movements intact.      Conjunctiva/sclera: Conjunctivae normal.      Pupils: Pupils are equal, round, and reactive to light.   Cardiovascular:      Rate and Rhythm: Normal rate and regular rhythm.      Heart sounds: Normal heart sounds.   Pulmonary:      Effort: Pulmonary effort is normal. No respiratory distress.      Breath sounds: Normal breath sounds. No wheezing or rales.   Abdominal:      General: Bowel sounds are normal. There is no distension.      Palpations: Abdomen is soft.      Tenderness: There is no abdominal tenderness.      Comments: Candida intertrigo noted to gluteal folds, groin, lower extremities and feet   Musculoskeletal:         General: No swelling or tenderness. Normal range of motion.      Cervical back: Normal range of motion and neck supple.   Lymphadenopathy:      Cervical: No cervical adenopathy.   Skin:     General: Skin is warm and dry.      Capillary Refill: Capillary refill takes less than 2 seconds.      Findings: Erythema and rash (upper and lower extremities, trunk, and back (improved from previous) present.   Neurological:      Mental Status: She is alert and oriented to person, place, and time.      Cranial Nerves: No cranial nerve deficit.      Sensory: No sensory deficit.      Coordination: Coordination normal.   Psychiatric:         Behavior: Behavior normal.         Thought Content: Thought content normal.         Judgment: Judgment normal.                           Significant Labs: All pertinent labs within the past 24 hours have been reviewed.  CMP:   Recent Labs   Lab 01/15/23  0743      K 4.1      CO2 20*   *   BUN 45*   CREATININE 1.1   CALCIUM 8.5*   ANIONGAP 9         Significant Imaging: I have reviewed all pertinent imaging results/findings within the past 24 hours.    X-Ray Ankle Complete  Right  Narrative: EXAMINATION:  XR ANKLE COMPLETE 3 VIEW RIGHT    CLINICAL HISTORY:  r/o osteomyelitis;    TECHNIQUE:  AP, lateral, and oblique images of the right ankle were performed.    COMPARISON:  January 1, 2020    FINDINGS:  There is diffuse osseous demineralization.  No definite acute fracture or bony destructive process is seen.  There is mild soft tissue swelling.  There are vascular calcifications in the soft tissues.  There are calcaneal spurs.  Calcification inferior to the lateral malleolus seen on the prior examination is no longer identified.  Alignment is preserved.  Impression: As above.    Electronically signed by: Madeline Wang MD  Date:    01/14/2023  Time:    07:31  X-Ray Tibia Fibula 2 View Right  Narrative: EXAMINATION:  XR TIBIA FIBULA 2 VIEW RIGHT    CLINICAL HISTORY:  r/o osteomyelitis;    TECHNIQUE:  AP and lateral views of the right tibia and fibula were performed.    COMPARISON:  October 7, 2022    FINDINGS:  No definite acute fracture or bony destructive process is seen.  Alignment is preserved.  As previously there are mild degenerative changes at the knee.  There are vascular calcifications in the soft tissues.  Impression: No detrimental change since October 7, 2022    Electronically signed by: Madeline Wang MD  Date:    01/14/2023  Time:    07:25

## 2023-01-16 NOTE — ASSESSMENT & PLAN NOTE
- acute on chronic issue  - afebrile without leukocytosis  - ESR, CRP elevated, lactic downtrending  - LE US negative for DVT  - s/p IV vanc x3  - cipro discontinued for suspected drug eruption  - continue doxy for gram neg and pseudomonas coverage  - Pain control with scheduled tylenol, prn oxy 5 q6h and oxy 10 mg q6 prn (to be used with dressing changes)    - bowel regimen in place to prevent opioid induced constipation  - wound care consulted, continue BID dressing changes by nursing   -- ana added to promote wound healing  - elevate extremity  - ID consulted, appreciate recs:  1. RLE erythema despite prolong course of abx therapy. Would monitor off of abx at this time. Start trial of steroids (5 day course) and antifungals  2. Dermatology following, no concerning feature to prompt skin biopsy at this time.   3. Continue local wound care and steroid cream for rash

## 2023-01-16 NOTE — ASSESSMENT & PLAN NOTE
- acute on chronic issue  - afebrile without leukocytosis  - ESR, CRP elevated, lactic downtrending  - LE US negative for DVT  - s/p IV vanc x3  - cipro discontinued for suspected drug eruption  - continue doxy for gram neg and pseudomonas coverage  - Pain control with scheduled tylenol, prn oxy 5 q6h and oxy 10 mg q6 prn (to be used with dressing changes)    - bowel regimen in place to prevent opioid induced constipation  - wound care consulted, continue BID dressing changes by nursing   -- ana added to promote wound healing  - elevate extremity  - ID consulted, appreciate recs:  1. RLE erythema despite prolong course of abx therapy. Would monitor off of abx at this time. Start trial of steroids (5 day course) and antifungals  2. Dermatology following, no concerning feature to prompt skin biopsy at this time.   3. Continue local wound care and steroid cream for rash   - Wound cultures + few MRSA - discussed with Agnes Mcneil PA-C with ID who recommended continuing steroids and antifungals as RLE continues to improve

## 2023-01-16 NOTE — SUBJECTIVE & OBJECTIVE
Interval History: Pt seen and examined by me this morning. FRANKRaul KATLYNPHYLLIS. Pt reports pain well controlled on current regimen. Plan to continue steroids and antifungals. POC discussed, all questions answered.     Review of Systems   Constitutional:  Negative for appetite change, chills and fever.   HENT:  Negative for congestion, trouble swallowing and voice change.    Eyes:  Negative for photophobia and visual disturbance.   Respiratory:  Negative for cough, chest tightness and wheezing.    Cardiovascular:  Positive for leg swelling. Negative for chest pain and palpitations.   Gastrointestinal:  Negative for abdominal pain, constipation, diarrhea, nausea and vomiting.   Endocrine: Negative for polyphagia and polyuria.   Genitourinary:  Negative for decreased urine volume, difficulty urinating, dysuria, flank pain and hematuria.   Musculoskeletal:  Positive for arthralgias (R hip). Negative for back pain and gait problem.   Skin:  Positive for color change and wound.   Neurological:  Negative for dizziness, seizures, syncope, weakness, numbness and headaches.   Psychiatric/Behavioral:  Negative for agitation, behavioral problems and confusion.    Objective:     Vital Signs (Most Recent):  Temp: 97.8 °F (36.6 °C) (01/15/23 1556)  Pulse: 66 (01/15/23 1556)  Resp: 18 (01/15/23 1556)  BP: (!) 140/60 (01/15/23 1556)  SpO2: (!) 93 % (01/15/23 1556) Vital Signs (24h Range):  Temp:  [96.3 °F (35.7 °C)-98 °F (36.7 °C)] 97.8 °F (36.6 °C)  Pulse:  [65-77] 66  Resp:  [16-18] 18  SpO2:  [93 %-99 %] 93 %  BP: (114-140)/(56-60) 140/60     Weight: 76.8 kg (169 lb 5 oz)  Body mass index is 28.18 kg/m².  No intake or output data in the 24 hours ending 01/15/23 1900     Physical Exam  Vitals and nursing note reviewed.   Constitutional:       General: She is not in acute distress.     Appearance: She is well-developed.   HENT:      Head: Normocephalic and atraumatic.      Mouth/Throat:      Mouth: Mucous membranes are moist.       Pharynx: No oropharyngeal exudate.   Eyes:      Extraocular Movements: Extraocular movements intact.      Conjunctiva/sclera: Conjunctivae normal.      Pupils: Pupils are equal, round, and reactive to light.   Cardiovascular:      Rate and Rhythm: Normal rate and regular rhythm.      Heart sounds: Normal heart sounds.   Pulmonary:      Effort: Pulmonary effort is normal. No respiratory distress.      Breath sounds: Normal breath sounds. No wheezing or rales.   Abdominal:      General: Bowel sounds are normal. There is no distension.      Palpations: Abdomen is soft.      Tenderness: There is no abdominal tenderness.      Comments: Candida intertrigo noted to gluteal folds, groin, lower extremities and feet   Musculoskeletal:         General: No swelling or tenderness. Normal range of motion.      Cervical back: Normal range of motion and neck supple.   Lymphadenopathy:      Cervical: No cervical adenopathy.   Skin:     General: Skin is warm and dry.      Capillary Refill: Capillary refill takes less than 2 seconds.      Findings: Erythema and rash (upper and lower extremities, trunk, and back (improved from previous) present.   Neurological:      Mental Status: She is alert and oriented to person, place, and time.      Cranial Nerves: No cranial nerve deficit.      Sensory: No sensory deficit.      Coordination: Coordination normal.   Psychiatric:         Behavior: Behavior normal.         Thought Content: Thought content normal.         Judgment: Judgment normal.                 Significant Labs: All pertinent labs within the past 24 hours have been reviewed.  CMP:   Recent Labs   Lab 01/14/23  0938 01/15/23  0743    136   K 3.6 4.1    107   CO2 23 20*   * 129*   BUN 41* 45*   CREATININE 1.1 1.1   CALCIUM 8.4* 8.5*   ANIONGAP 13 9         Significant Imaging: I have reviewed all pertinent imaging results/findings within the past 24 hours.    X-Ray Ankle Complete Right  Narrative: EXAMINATION:  XR  ANKLE COMPLETE 3 VIEW RIGHT    CLINICAL HISTORY:  r/o osteomyelitis;    TECHNIQUE:  AP, lateral, and oblique images of the right ankle were performed.    COMPARISON:  January 1, 2020    FINDINGS:  There is diffuse osseous demineralization.  No definite acute fracture or bony destructive process is seen.  There is mild soft tissue swelling.  There are vascular calcifications in the soft tissues.  There are calcaneal spurs.  Calcification inferior to the lateral malleolus seen on the prior examination is no longer identified.  Alignment is preserved.  Impression: As above.    Electronically signed by: Madeline Wang MD  Date:    01/14/2023  Time:    07:31  X-Ray Tibia Fibula 2 View Right  Narrative: EXAMINATION:  XR TIBIA FIBULA 2 VIEW RIGHT    CLINICAL HISTORY:  r/o osteomyelitis;    TECHNIQUE:  AP and lateral views of the right tibia and fibula were performed.    COMPARISON:  October 7, 2022    FINDINGS:  No definite acute fracture or bony destructive process is seen.  Alignment is preserved.  As previously there are mild degenerative changes at the knee.  There are vascular calcifications in the soft tissues.  Impression: No detrimental change since October 7, 2022    Electronically signed by: Madeline Wang MD  Date:    01/14/2023  Time:    07:25

## 2023-01-16 NOTE — PLAN OF CARE
No acute events during shift. Patient alert and oriented. VSS. Fall and safety precautions maintained. Bed in lowest locked position. Call light within reach. Bed alarm maintained. POC discussed with patient. Will continue to monitor.       Problem: Adult Inpatient Plan of Care  Goal: Plan of Care Review  1/16/2023 0607 by Sagrario Su LPN  Outcome: Ongoing, Progressing  1/16/2023 0607 by Sagrario Su LPN  Outcome: Ongoing, Progressing  Goal: Patient-Specific Goal (Individualized)  1/16/2023 0607 by Sagrario Su LPN  Outcome: Ongoing, Progressing  1/16/2023 0607 by Sagrario Su LPN  Outcome: Ongoing, Progressing  Goal: Absence of Hospital-Acquired Illness or Injury  1/16/2023 0607 by Sagrario Su LPN  Outcome: Ongoing, Progressing  1/16/2023 0607 by Sagrario Su LPN  Outcome: Ongoing, Progressing  Goal: Optimal Comfort and Wellbeing  1/16/2023 0607 by Sagrario Su LPN  Outcome: Ongoing, Progressing  1/16/2023 0607 by Sagrario Su LPN  Outcome: Ongoing, Progressing  Goal: Readiness for Transition of Care  1/16/2023 0607 by Sagrario Su LPN  Outcome: Ongoing, Progressing  1/16/2023 0607 by Sagrario Su LPN  Outcome: Ongoing, Progressing     Problem: Infection  Goal: Absence of Infection Signs and Symptoms  1/16/2023 0607 by Sagrario Su LPN  Outcome: Ongoing, Progressing  1/16/2023 0607 by Sagrario Su LPN  Outcome: Ongoing, Progressing     Problem: Diabetes Comorbidity  Goal: Blood Glucose Level Within Targeted Range  1/16/2023 0607 by Sagrario Su LPN  Outcome: Ongoing, Progressing  1/16/2023 0607 by Sagrario Su LPN  Outcome: Ongoing, Progressing     Problem: Fluid and Electrolyte Imbalance (Acute Kidney Injury/Impairment)  Goal: Fluid and Electrolyte Balance  1/16/2023 0607 by Sagrario Su LPN  Outcome: Ongoing, Progressing  1/16/2023 0607 by Sagrario Su LPN  Outcome: Ongoing, Progressing     Problem: Oral Intake Inadequate (Acute Kidney Injury/Impairment)  Goal: Optimal Nutrition  Intake  1/16/2023 0607 by Sagrario Su LPN  Outcome: Ongoing, Progressing  1/16/2023 0607 by Sagrario Su LPN  Outcome: Ongoing, Progressing     Problem: Renal Function Impairment (Acute Kidney Injury/Impairment)  Goal: Effective Renal Function  1/16/2023 0607 by Sagrario Su LPN  Outcome: Ongoing, Progressing  1/16/2023 0607 by Sagrario Su LPN  Outcome: Ongoing, Progressing     Problem: Impaired Wound Healing  Goal: Optimal Wound Healing  1/16/2023 0607 by Sagrario Su LPN  Outcome: Ongoing, Progressing  1/16/2023 0607 by Sagrario Su LPN  Outcome: Ongoing, Progressing     Problem: Fall Injury Risk  Goal: Absence of Fall and Fall-Related Injury  1/16/2023 0607 by Sagrario Su LPN  Outcome: Ongoing, Progressing  1/16/2023 0607 by Sagrario Su LPN  Outcome: Ongoing, Progressing     Problem: Skin Injury Risk Increased  Goal: Skin Health and Integrity  1/16/2023 0607 by Sagrario Su LPN  Outcome: Ongoing, Progressing  1/16/2023 0607 by Sagrario Su LPN  Outcome: Ongoing, Progressing     Problem: Restraint, Nonbehavioral (Nonviolent)  Goal: Absence of Harm or Injury  1/16/2023 0607 by Sagrario Su LPN  Outcome: Ongoing, Progressing  1/16/2023 0607 by Sagrario Su LPN  Outcome: Ongoing, Progressing

## 2023-01-16 NOTE — PLAN OF CARE
"   NURSING HOME ORDERS    01/18/2023  [facility]  [facility]  [address]  [facility] [address]  Dept: [phone]  Loc: [phone]     Admit to Nursing Home:      Diagnoses:  Active Hospital Problems    Diagnosis  POA    *Cellulitis of right lower extremity [L03.115]  Yes    High anion gap metabolic acidosis [E87.29]  No    Drug eruption [L27.0]  No    Delirium [R41.0]  No    Intertrigo [L30.4]  Yes    Trochanteric bursitis of right hip [M70.61]  Yes    JOSHUA (acute kidney injury) [N17.9]  Yes    Hyperkalemia [E87.5]  Yes    Overweight (BMI 25.0-29.9) [E66.3]  Yes    Stage 3b chronic kidney disease [N18.32]  Yes    Chronic diastolic heart failure [I50.32]  Yes     Chronic     TTE (10/7/2022):    The left ventricle is normal in size with concentric remodeling and normal systolic function.  The estimated ejection fraction is 55-60%.  Grade III left ventricular diastolic dysfunction.  Mild mitral regurgitation.  Normal right ventricular size with mildly reduced right ventricular systolic function.  Severe tricuspid regurgitation.  The estimated PA systolic pressure is 52 mmHg. PASP may be under-estimated due to TR severity.  Elevated central venous pressure (15 mmHg).  There is pulmonary hypertension.  Severe left atrial enlargement.           Chronic atrial fibrillation [I48.20]  Yes     Chronic    Gait instability [R26.81]  Yes    Debility [R53.81]  Yes    Primary hypertension [I10]  Yes     Chronic    Pure hypercholesterolemia [E78.00]  Yes     Chronic      Resolved Hospital Problems   No resolved problems to display.       Patient is homebound due to:  Cellulitis of right lower extremity    Allergies:  Review of patient's allergies indicates:   Allergen Reactions    Opioids - morphine analogues Other (See Comments)     Bowel issues; bowel obstruction    Tizanidine Other (See Comments)     "Lips were numb,  Almost passed out."    Tramadol Hallucinations    Beta-blockers " (beta-adrenergic blocking agts) Other (See Comments)     Can not go on beta blockers for long period of time - due to taking allergy injections    Morphine     Opioids-meperidine and related     Ciprofloxacin Rash       Vitals:  Every shift    Diet: regular diet    Activities:   Up in a chair each morning as tolerated and Activity as tolerated    Goals of Care Treatment Preferences:  Code Status: Full Code      Labs: Per facility protocol    Nursing Precautions:  Fall and Pressure ulcer prevention    Consults:   PT to evaluate and treat- 5 times a week, OT to evaluate and treat- 5 times a week, Wound Care, and Nutrition to evaluate and recommend diet     Miscellaneous Care: Routine Skin for Bedridden Patients:  Apply moisture barrier cream to all  Wound Care: yes:  see below    Wound Care: Recommend twice a day RLE dressing changes: clean with quarter strength dakin's solution, dress with silvadene, vaseline gauze, ABD pads and secure with kerlix.    Medications: Discontinue all previous medication orders, if any. See new list below.     Medication List        START taking these medications      miconazole NITRATE 2 % 2 % top powder  Commonly known as: MICOTIN  Apply topically 2 (two) times daily.  Replaces: CRITIC-AID CLEAR AF(MICONAZOL) 2 % Oint     * OLANZapine 2.5 MG tablet  Commonly known as: ZyPREXA  Take 1 tablet (2.5 mg total) by mouth every 8 (eight) hours as needed (non-redirectable agitation).     * OLANZapine 2.5 MG tablet  Commonly known as: ZyPREXA  Take 1 tablet (2.5 mg total) by mouth every evening.     silver sulfADIAZINE 1% 1 % cream  Commonly known as: SILVADENE  Apply to RLE skin breakdown twice daily     triamcinolone acetonide 0.1% 0.1 % ointment  Commonly known as: KENALOG  Apply to entire body twice daily           * This list has 2 medication(s) that are the same as other medications prescribed for you. Read the directions carefully, and ask your doctor or other care provider to review them  with you.                CONTINUE taking these medications      acetaminophen 500 MG tablet  Commonly known as: TYLENOL  Take 1,000 mg by mouth 2 (two) times a day.     aspirin 81 MG EC tablet  Commonly known as: ECOTRIN  Take 1 tablet (81 mg total) by mouth once daily.     azelastine 137 mcg (0.1 %) nasal spray  Commonly known as: ASTELIN  1 spray (137 mcg total) by Nasal route 2 (two) times daily.     diclofenac sodium 1 % Gel  Commonly known as: VOLTAREN  Apply 2 g topically 4 (four) times daily. Use gloves to apply for 10 days     famotidine 20 MG tablet  Commonly known as: PEPCID  Take 1 tablet (20 mg total) by mouth 2 (two) times daily. For Allergic Reaction.     fluticasone propionate 50 mcg/actuation nasal spray  Commonly known as: FLONASE  USE 1 SPRAY IN EACH NOSTRIL ONE TIME DAILY     furosemide 20 MG tablet  Commonly known as: LASIX  Take 1 tablet (20 mg total) by mouth 2 (two) times daily before meals. Take twice a day for Weight greater than 160 lb, once a day for weights less than 160 lb     guaiFENesin 100 mg/5 ml 100 mg/5 mL syrup  Commonly known as: ROBITUSSIN  Take 15 mLs by mouth once daily.     levocetirizine 5 MG tablet  Commonly known as: XYZAL  Take 1 tablet (5 mg total) by mouth once daily. For Allergies.     LIDOcaine 5 %  Commonly known as: LIDODERM  Place 1 patch onto the skin once daily. Remove & Discard patch within 12 hours or as directed by provider     OCUVITE LUTEIN AND ZEAXANTHIN ORAL  Take 1 capsule by mouth once daily. Lutien 25 mg, Zeaxanthin 5 mg     pravastatin 40 MG tablet  Commonly known as: PRAVACHOL  Take 1 tablet (40 mg total) by mouth once daily.     spironolactone 25 MG tablet  Commonly known as: ALDACTONE  Take twice a day for Weight greater than 160 lb, once a day for weights less than 160 lb     vitamin E 400 UNIT capsule  Take 400 Units by mouth once daily.            STOP taking these medications      clotrimazole 1 % cream  Commonly known as: LOTRIMIN      CRITIC-AID CLEAR AF(MICONAZOL) 2 % Oint  Generic drug: miconazole nitrate 2%  Replaced by: miconazole NITRATE 2 % 2 % top powder     gentamicin 0.1 % cream  Commonly known as: GARAMYCIN     mupirocin 2 % ointment  Commonly known as: BACTROBAN                Immunizations Administered as of 1/16/2023       Name Date Dose VIS Date Route Exp Date    COVID-19, MRNA, LN-S, PF (Pfizer) (Purple Cap) 9/17/2021 0.3 mL -- Intramuscular --    Site: Left deltoid     : [employer]     Lot: [id number]     COVID-19, MRNA, LN-S, PF (Pfizer) (Purple Cap) 2/5/2021 10:50 AM 0.3 mL 12/12/2020 Intramuscular 5/31/2021    Site: Right deltoid     Given By: Rani Rutherford LPN     : [employer]     Lot: [id number]     COVID-19, MRNA, LN-S, PF (Pfizer) (Purple Cap) 1/14/2021  2:30 PM 0.3 mL 12/12/2020 Intramuscular 4/30/2021    Site: Right arm     Given By: Chela Lopez LPN     : [employer]     Lot: [id number]             This patient has had both covid vaccinations          Lorena Moore PA-C  01/18/2023

## 2023-01-17 ENCOUNTER — EXTERNAL HOME HEALTH (OUTPATIENT)
Dept: HOME HEALTH SERVICES | Facility: HOSPITAL | Age: 88
End: 2023-01-17
Payer: MEDICARE

## 2023-01-17 ENCOUNTER — TELEPHONE (OUTPATIENT)
Dept: INTERNAL MEDICINE | Facility: CLINIC | Age: 88
End: 2023-01-17
Payer: MEDICARE

## 2023-01-17 LAB
BACTERIA SPEC ANAEROBE CULT: NORMAL
SARS-COV-2 RNA RESP QL NAA+PROBE: NOT DETECTED

## 2023-01-17 PROCEDURE — 63600175 PHARM REV CODE 636 W HCPCS: Mod: HCNC | Performed by: PHYSICIAN ASSISTANT

## 2023-01-17 PROCEDURE — 63600175 PHARM REV CODE 636 W HCPCS: Mod: HCNC | Performed by: HOSPITALIST

## 2023-01-17 PROCEDURE — 97535 SELF CARE MNGMENT TRAINING: CPT | Mod: HCNC

## 2023-01-17 PROCEDURE — 93005 ELECTROCARDIOGRAM TRACING: CPT | Mod: HCNC

## 2023-01-17 PROCEDURE — 11000001 HC ACUTE MED/SURG PRIVATE ROOM: Mod: HCNC

## 2023-01-17 PROCEDURE — 25000003 PHARM REV CODE 250: Mod: HCNC | Performed by: PHYSICIAN ASSISTANT

## 2023-01-17 PROCEDURE — 99233 SBSQ HOSP IP/OBS HIGH 50: CPT | Mod: HCNC,,,

## 2023-01-17 PROCEDURE — U0005 INFEC AGEN DETEC AMPLI PROBE: HCPCS

## 2023-01-17 PROCEDURE — 99233 SBSQ HOSP IP/OBS HIGH 50: CPT | Mod: HCNC,,, | Performed by: PHYSICIAN ASSISTANT

## 2023-01-17 PROCEDURE — 99233 PR SUBSEQUENT HOSPITAL CARE,LEVL III: ICD-10-PCS | Mod: HCNC,,,

## 2023-01-17 PROCEDURE — 27000207 HC ISOLATION: Mod: HCNC

## 2023-01-17 PROCEDURE — 93010 ELECTROCARDIOGRAM REPORT: CPT | Mod: HCNC,,, | Performed by: INTERNAL MEDICINE

## 2023-01-17 PROCEDURE — 93010 EKG 12-LEAD: ICD-10-PCS | Mod: HCNC,,, | Performed by: INTERNAL MEDICINE

## 2023-01-17 PROCEDURE — 97116 GAIT TRAINING THERAPY: CPT | Mod: HCNC

## 2023-01-17 PROCEDURE — 99233 PR SUBSEQUENT HOSPITAL CARE,LEVL III: ICD-10-PCS | Mod: HCNC,,, | Performed by: PHYSICIAN ASSISTANT

## 2023-01-17 PROCEDURE — 25000003 PHARM REV CODE 250: Mod: HCNC

## 2023-01-17 RX ORDER — MICONAZOLE NITRATE 2 %
POWDER (GRAM) TOPICAL 2 TIMES DAILY
Status: DISCONTINUED | OUTPATIENT
Start: 2023-01-17 | End: 2023-01-24 | Stop reason: HOSPADM

## 2023-01-17 RX ORDER — MICONAZOLE NITRATE 2 %
POWDER (GRAM) TOPICAL 2 TIMES DAILY
Refills: 0
Start: 2023-01-17 | End: 2023-01-24 | Stop reason: SDUPTHER

## 2023-01-17 RX ADMIN — PRAVASTATIN SODIUM 40 MG: 40 TABLET ORAL at 09:01

## 2023-01-17 RX ADMIN — ACETAMINOPHEN 1000 MG: 500 TABLET ORAL at 05:01

## 2023-01-17 RX ADMIN — TRIAMCINOLONE ACETONIDE: 1 OINTMENT TOPICAL at 09:01

## 2023-01-17 RX ADMIN — SENNOSIDES 8.6 MG: 8.6 TABLET, FILM COATED ORAL at 09:01

## 2023-01-17 RX ADMIN — POLYETHYLENE GLYCOL 3350 17 G: 17 POWDER, FOR SOLUTION ORAL at 09:01

## 2023-01-17 RX ADMIN — MICONAZOLE NITRATE: 20 CREAM TOPICAL at 09:01

## 2023-01-17 RX ADMIN — ASPIRIN 81 MG: 81 TABLET, COATED ORAL at 09:01

## 2023-01-17 RX ADMIN — MICONAZOLE NITRATE 2 % TOPICAL POWDER: at 09:01

## 2023-01-17 RX ADMIN — ENOXAPARIN SODIUM 40 MG: 40 INJECTION SUBCUTANEOUS at 05:01

## 2023-01-17 RX ADMIN — SILVER SULFADIAZINE: 10 CREAM TOPICAL at 09:01

## 2023-01-17 RX ADMIN — PREDNISONE 20 MG: 20 TABLET ORAL at 09:01

## 2023-01-17 RX ADMIN — FUROSEMIDE 20 MG: 20 TABLET ORAL at 05:01

## 2023-01-17 RX ADMIN — FLUCONAZOLE 200 MG: 200 TABLET ORAL at 09:01

## 2023-01-17 RX ADMIN — DICLOFENAC SODIUM 2 G: 10 GEL TOPICAL at 09:01

## 2023-01-17 RX ADMIN — OLANZAPINE 2.5 MG: 2.5 TABLET, FILM COATED ORAL at 09:01
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
## 2023-01-17 NOTE — ASSESSMENT & PLAN NOTE
89 y/o female with HLD, HTN admitted for recurrent RLE cellulitis and drug rash.  ID previously followed and ID staff cf inflammatory component or pyoderma.  Derm defers biopsy.  Skin Cx on admit shows MRSA and patient completed 9d of vanc and doxycycline.  Suspect colonization as no active signs of infection (heat or significant ttp).  Drug rash suspected from Cipro and is now improving off abx with steroid treatment.  Has superimposed candida dermatitis under pannus and in inguinal areas.  Has been on po fluconazole.  No systemic sign of infection.  Suspect underlying chronic venous stasis and venous side insufficiency.    Plan:  · As per prior recs, would monitor off of abx at this time. Complete steroids as palnned   · Stop fluconazole given recent drug rash and start antifungal powder to affected areas  · Continue local wound care to RLE and judicious leg elevation   · Continue steroid cream for rash  · Will sign off  · FU in 7-10 in ID clinic  ·      Discussed with ID staff. Discussed with primary team.

## 2023-01-17 NOTE — TELEPHONE ENCOUNTER
----- Message from Sarah Byrne sent at 1/13/2023  2:35 PM CST -----  Contact: [phone]  Pt's daughter Afshan called to advise that her mother is in need of a hearing aid. She says the pt trusts Ms Paredes and would like to discuss this with her. Please Advise      24-Jun-2019 18:00

## 2023-01-17 NOTE — PROGRESS NOTES
[facility]  Infectious Disease  Progress Note    Patient Name: Jenniffer Jimenez  MRN: [id number]  Admission Date: 1/4/2023  Length of Stay: 8 days  Attending Physician: Nely Ruiz MD  Primary Care Provider: Rubi Young DO    Isolation Status: Contact  Assessment/Plan:      * Cellulitis of right lower extremity  89 y/o female with HLD, HTN admitted for recurrent RLE cellulitis and drug rash.  ID previously followed and ID staff cf inflammatory component or pyoderma.  Derm defers biopsy.  Skin Cx on admit shows MRSA and patient completed 9d of vanc and doxycycline.  Suspect colonization as no active signs of infection (heat or significant ttp).  Drug rash suspected from Cipro and is now improving off abx with steroid treatment.  Has superimposed candida dermatitis under pannus and in inguinal areas.  Has been on po fluconazole.  No systemic sign of infection.  Suspect underlying chronic venous stasis and venous side insufficiency.    Plan:  As per prior recs, would monitor off of abx at this time. Complete steroids as palnned   Stop fluconazole given recent drug rash and start antifungal powder to affected areas  Continue local wound care to RLE and judicious leg elevation   Continue steroid cream for rash  Rec vasc Sx fu for eval of venous insufficiency in RLE  Will sign off  FU in 7-10 in ID clinic       Discussed with ID staff. Discussed with primary team.        Anticipated Disposition: tbd    Thank you for your consult. I will sign off. Please contact us if you have any additional questions.    TAYLOR Bowers  Infectious Disease  [facility] 11[facility]    Subjective:     Principal Problem:Cellulitis of right lower extremity    HPI: 89 y/o female with HLD, HTN, AF, admitted for recurrent RLE cellulitis and drug rash. Pt is followed by wound care outpatient and recently completed a course of ciprofloxacin in December. She developed drug rash and was seen at urgent care. Rash  not improved and RLE erythema worsen so presented to ED. Pt remained afebrile, stable, no leukocytosis.     She was on IV vancomycin and ciprofloxacin. She was seen by dermatology, suspect ciprofloxacin caused drug rash. No biopsy at this time for RLE. Vascular previously saw pt in 11/2022. No acute intervention at this time. She is currently on doxycycline and augmentin.     Interval History:   No AEON.  Afebrile and WBC WNL.  Feels rash is improving.  The patient denies any recent fever, chills, or sweats.      Review of Systems   Constitutional:  Negative for activity change, chills, diaphoresis and fever.   Respiratory:  Negative for cough, shortness of breath and wheezing.    Cardiovascular:  Negative for chest pain.   Gastrointestinal:  Negative for abdominal pain, constipation, diarrhea, nausea and vomiting.   Genitourinary:  Negative for dysuria, frequency and urgency.   Skin:  Positive for color change, rash and wound.   Neurological:  Negative for dizziness.   Hematological:  Does not bruise/bleed easily.   Objective:     Vital Signs (Most Recent):  Temp: 98.4 °F (36.9 °C) (01/17/23 1138)  Pulse: 62 (01/17/23 1138)  Resp: 18 (01/17/23 1138)  BP: 129/62 (01/17/23 1138)  SpO2: 97 % (01/17/23 1138) Vital Signs (24h Range):  Temp:  [97.3 °F (36.3 °C)-98.4 °F (36.9 °C)] 98.4 °F (36.9 °C)  Pulse:  [53-75] 62  Resp:  [17-18] 18  SpO2:  [93 %-98 %] 97 %  BP: ()/(42-75) 129/62     Weight: 76.8 kg (169 lb 5 oz)  Body mass index is 28.18 kg/m².    Estimated Creatinine Clearance: 34.8 mL/min (based on SCr of 1.1 mg/dL).    Physical Exam  Vitals and nursing note reviewed.   Constitutional:       General: She is not in acute distress.     Appearance: She is well-developed. She is not diaphoretic.      Comments: Hard of hearing   HENT:      Head: Normocephalic.   Eyes:      Pupils: Pupils are equal, round, and reactive to light.   Cardiovascular:      Rate and Rhythm: Normal rate and regular rhythm.      Heart  sounds: Normal heart sounds.   Pulmonary:      Effort: Pulmonary effort is normal. No respiratory distress.      Breath sounds: Normal breath sounds.   Abdominal:      General: Bowel sounds are normal.      Palpations: Abdomen is soft.   Musculoskeletal:         General: Tenderness present. No signs of injury. Normal range of motion.      Cervical back: Normal range of motion and neck supple.      Right lower leg: Edema present.   Skin:     General: Skin is warm and dry.      Findings: Erythema and rash present.      Comments: Extensive rash of upper, lower, trunk and back  Candida intertrigo noted of gluteal folds, groin, lower extremities and feet   Neurological:      Mental Status: She is alert.   Psychiatric:         Behavior: Behavior normal.   1/17/23                                  Significant Labs: Blood Culture:   Recent Labs   Lab 01/06/23  1619 01/13/23  1039 01/13/23  1040   LABBLOO No growth after 5 days.  No growth after 5 days. No Growth to date  No Growth to date  No Growth to date  No Growth to date  No Growth to date No Growth to date  No Growth to date  No Growth to date  No Growth to date  No Growth to date     CBC: No results for input(s): WBC, HGB, HCT, PLT in the last 48 hours.  CMP: No results for input(s): NA, K, CL, CO2, GLU, BUN, CREATININE, CALCIUM, PROT, ALBUMIN, BILITOT, ALKPHOS, AST, ALT, ANIONGAP, EGFRNONAA in the last 48 hours.    Invalid input(s): ESTGFAFRICA  Wound Culture:   Recent Labs   Lab 11/02/22  0944 01/13/23  1132   LABAERO PSEUDOMONAS AERUGINOSA  Many  Skin raissa also present  * METHICILLIN RESISTANT STAPHYLOCOCCUS AUREUS  Few  *     All pertinent labs within the past 24 hours have been reviewed.    Significant Imaging: I have reviewed all pertinent imaging results/findings within the past 24 hours.  X-Ray Ankle Complete Right [[id number]] Resulted: 01/14/23 0731   Order Status: Completed Updated: 01/14/23 0733   Narrative:     EXAMINATION:   XR ANKLE COMPLETE 3  VIEW RIGHT     CLINICAL HISTORY:   r/o osteomyelitis;     TECHNIQUE:   AP, lateral, and oblique images of the right ankle were performed.     COMPARISON:   January 1, 2020     FINDINGS:   There is diffuse osseous demineralization.  No definite acute fracture or bony destructive process is seen.  There is mild soft tissue swelling.  There are vascular calcifications in the soft tissues.  There are calcaneal spurs.  Calcification inferior to the lateral malleolus seen on the prior examination is no longer identified.  Alignment is preserved.    Impression:       As above.       Electronically signed by: Madeline Wang MD   Date: 01/14/2023   Time: 07:31   X-Ray Tibia Fibula 2 View Right [[id number]] Resulted: 01/14/23 0725   Order Status: Completed Updated: 01/14/23 0728   Narrative:     EXAMINATION:   XR TIBIA FIBULA 2 VIEW RIGHT     CLINICAL HISTORY:   r/o osteomyelitis;     TECHNIQUE:   AP and lateral views of the right tibia and fibula were performed.     COMPARISON:   October 7, 2022     FINDINGS:   No definite acute fracture or bony destructive process is seen.  Alignment is preserved.  As previously there are mild degenerative changes at the knee.  There are vascular calcifications in the soft tissues.    Impression:       No detrimental change since October 7, 2022       Electronically signed by: Madeline Wang MD   Date: 01/14/2023   Time: 07:25     Imaging History    2023    Date Procedure Name Study Review Link PACS Link Status Accession Number Location   01/13/23 04:44 PM X-Ray Ankle Complete Right Study Review  Images Final [id number] [facility]   01/13/23 04:41 PM X-Ray Tibia Fibula 2 View Right Study Review  Images Final [id number] [facility]   01/05/23 11:54 AM US Lower Extremity Veins Bilateral Study Review  Images Final [id number] [facility]   01/05/23 01:34 AM X-Ray Chest AP Portable Study Review  Images Final [id number] [facility]

## 2023-01-17 NOTE — SUBJECTIVE & OBJECTIVE
Interval History:   No AEON.  Afebrile and WBC WNL.  Feels rash is improving.  The patient denies any recent fever, chills, or sweats.      Review of Systems   Constitutional:  Negative for activity change, chills, diaphoresis and fever.   Respiratory:  Negative for cough, shortness of breath and wheezing.    Cardiovascular:  Negative for chest pain.   Gastrointestinal:  Negative for abdominal pain, constipation, diarrhea, nausea and vomiting.   Genitourinary:  Negative for dysuria, frequency and urgency.   Skin:  Positive for color change, rash and wound.   Neurological:  Negative for dizziness.   Hematological:  Does not bruise/bleed easily.   Objective:     Vital Signs (Most Recent):  Temp: 98.4 °F (36.9 °C) (01/17/23 1138)  Pulse: 62 (01/17/23 1138)  Resp: 18 (01/17/23 1138)  BP: 129/62 (01/17/23 1138)  SpO2: 97 % (01/17/23 1138) Vital Signs (24h Range):  Temp:  [97.3 °F (36.3 °C)-98.4 °F (36.9 °C)] 98.4 °F (36.9 °C)  Pulse:  [53-75] 62  Resp:  [17-18] 18  SpO2:  [93 %-98 %] 97 %  BP: ()/(42-75) 129/62     Weight: 76.8 kg (169 lb 5 oz)  Body mass index is 28.18 kg/m².    Estimated Creatinine Clearance: 34.8 mL/min (based on SCr of 1.1 mg/dL).    Physical Exam  Vitals and nursing note reviewed.   Constitutional:       General: She is not in acute distress.     Appearance: She is well-developed. She is not diaphoretic.      Comments: Hard of hearing   HENT:      Head: Normocephalic.   Eyes:      Pupils: Pupils are equal, round, and reactive to light.   Cardiovascular:      Rate and Rhythm: Normal rate and regular rhythm.      Heart sounds: Normal heart sounds.   Pulmonary:      Effort: Pulmonary effort is normal. No respiratory distress.      Breath sounds: Normal breath sounds.   Abdominal:      General: Bowel sounds are normal.      Palpations: Abdomen is soft.   Musculoskeletal:         General: Tenderness present. No signs of injury. Normal range of motion.      Cervical back: Normal range of motion and  neck supple.      Right lower leg: Edema present.   Skin:     General: Skin is warm and dry.      Findings: Erythema and rash present.      Comments: Extensive rash of upper, lower, trunk and back  Candida intertrigo noted of gluteal folds, groin, lower extremities and feet   Neurological:      Mental Status: She is alert.   Psychiatric:         Behavior: Behavior normal.   1/17/23                                  Significant Labs: Blood Culture:   Recent Labs   Lab 01/06/23  1619 01/13/23  1039 01/13/23  1040   LABBLOO No growth after 5 days.  No growth after 5 days. No Growth to date  No Growth to date  No Growth to date  No Growth to date  No Growth to date No Growth to date  No Growth to date  No Growth to date  No Growth to date  No Growth to date     CBC: No results for input(s): WBC, HGB, HCT, PLT in the last 48 hours.  CMP: No results for input(s): NA, K, CL, CO2, GLU, BUN, CREATININE, CALCIUM, PROT, ALBUMIN, BILITOT, ALKPHOS, AST, ALT, ANIONGAP, EGFRNONAA in the last 48 hours.    Invalid input(s): ESTGFAFRICA  Wound Culture:   Recent Labs   Lab 11/02/22  0944 01/13/23  1132   LABAERO PSEUDOMONAS AERUGINOSA  Many  Skin raissa also present  * METHICILLIN RESISTANT STAPHYLOCOCCUS AUREUS  Few  *     All pertinent labs within the past 24 hours have been reviewed.    Significant Imaging: I have reviewed all pertinent imaging results/findings within the past 24 hours.  X-Ray Ankle Complete Right [[id number]] Resulted: 01/14/23 0731   Order Status: Completed Updated: 01/14/23 0733   Narrative:     EXAMINATION:   XR ANKLE COMPLETE 3 VIEW RIGHT     CLINICAL HISTORY:   r/o osteomyelitis;     TECHNIQUE:   AP, lateral, and oblique images of the right ankle were performed.     COMPARISON:   January 1, 2020     FINDINGS:   There is diffuse osseous demineralization.  No definite acute fracture or bony destructive process is seen.  There is mild soft tissue swelling.  There are vascular calcifications in the soft  tissues.  There are calcaneal spurs.  Calcification inferior to the lateral malleolus seen on the prior examination is no longer identified.  Alignment is preserved.    Impression:       As above.       Electronically signed by: Madeline Wang MD   Date: 01/14/2023   Time: 07:31   X-Ray Tibia Fibula 2 View Right [[id number]] Resulted: 01/14/23 0725   Order Status: Completed Updated: 01/14/23 0728   Narrative:     EXAMINATION:   XR TIBIA FIBULA 2 VIEW RIGHT     CLINICAL HISTORY:   r/o osteomyelitis;     TECHNIQUE:   AP and lateral views of the right tibia and fibula were performed.     COMPARISON:   October 7, 2022     FINDINGS:   No definite acute fracture or bony destructive process is seen.  Alignment is preserved.  As previously there are mild degenerative changes at the knee.  There are vascular calcifications in the soft tissues.    Impression:       No detrimental change since October 7, 2022       Electronically signed by: Madeline Wang MD   Date: 01/14/2023   Time: 07:25     Imaging History    2023    Date Procedure Name Study Review Link PACS Link Status Accession Number Location   01/13/23 04:44 PM X-Ray Ankle Complete Right Study Review  Images Final [id number] [facility]   01/13/23 04:41 PM X-Ray Tibia Fibula 2 View Right Study Review  Images Final [id number] [facility]   01/05/23 11:54 AM US Lower Extremity Veins Bilateral Study Review  Images Final [id number] [facility]   01/05/23 01:34 AM X-Ray Chest AP Portable Study Review  Images Final [id number] [facility]

## 2023-01-17 NOTE — PLAN OF CARE
01/17/23 1419   Discharge Reassessment   Assessment Type Discharge Planning Reassessment   Did the patient's condition or plan change since previous assessment? No   Discharge Plan discussed with: Adult children;Patient   Discharge Plan A Skilled Nursing Facility   Why the patient remains in the hospital Requires continued medical care   Post-Acute Status   Post-Acute Authorization Placement   Post-Acute Placement Status Pending payor review/awaiting authorization (if required)

## 2023-01-17 NOTE — ASSESSMENT & PLAN NOTE
- acute on chronic issue  - afebrile without leukocytosis  - ESR, CRP elevated, lactic downtrending  - LE US negative for DVT  - s/p IV vanc x3  - cipro discontinued for suspected drug eruption  - continue doxy for gram neg and pseudomonas coverage  - Pain control with scheduled tylenol, prn oxy 5 q6h and oxy 10 mg q6 prn (to be used with dressing changes)    - bowel regimen in place to prevent opioid induced constipation  - wound care consulted, continue BID dressing changes by nursing   -- ana added to promote wound healing  - elevate extremity  - Wound cultures + few MRSA - discussed with ID who recommended continuing steroids and antifungals as RLE continues to improve  - ID consulted, appreciate recs:    As per prior recs, would monitor off of abx at this time. Complete steroids as planned    Stop fluconazole given recent drug rash and start antifungal powder to affected areas   Continue local wound care to RLE and judicious leg elevation    Continue steroid cream for rash   Rec vasc Sx fu for eval of venous insufficiency in RLE   FU in 7-10 in ID clinic

## 2023-01-17 NOTE — SUBJECTIVE & OBJECTIVE
Interval History: Pt seen and examined by me this morning. FARNKRaul KATLYNPHYLLIS. Pt reports pain well controlled on current regimen, diffuse body rash improving. Plan to continue steroids and antifungals. POC discussed, all questions answered.     Review of Systems   Constitutional:  Negative for appetite change, chills and fever.   HENT:  Negative for congestion, trouble swallowing and voice change.    Eyes:  Negative for photophobia and visual disturbance.   Respiratory:  Negative for cough, chest tightness and wheezing.    Cardiovascular:  Positive for leg swelling. Negative for chest pain and palpitations.   Gastrointestinal:  Negative for abdominal pain, constipation, diarrhea, nausea and vomiting.   Endocrine: Negative for polyphagia and polyuria.   Genitourinary:  Negative for decreased urine volume, difficulty urinating, dysuria, flank pain and hematuria.   Musculoskeletal:  Positive for arthralgias (R hip). Negative for back pain and gait problem.   Skin:  Positive for color change and wound.   Neurological:  Negative for dizziness, seizures, syncope, weakness, numbness and headaches.   Psychiatric/Behavioral:  Negative for agitation, behavioral problems and confusion.    Objective:     Vital Signs (Most Recent):  Temp: 98.4 °F (36.9 °C) (01/17/23 1610)  Pulse: 63 (01/17/23 1610)  Resp: 18 (01/17/23 1610)  BP: (!) 144/63 (01/17/23 1610)  SpO2: 95 % (01/17/23 1610) Vital Signs (24h Range):  Temp:  [97.5 °F (36.4 °C)-98.4 °F (36.9 °C)] 98.4 °F (36.9 °C)  Pulse:  [53-75] 63  Resp:  [17-18] 18  SpO2:  [95 %-98 %] 95 %  BP: ()/(42-75) 144/63     Weight: 76.8 kg (169 lb 5 oz)  Body mass index is 28.18 kg/m².  No intake or output data in the 24 hours ending 01/17/23 5369     Physical Exam  Vitals and nursing note reviewed.   Constitutional:       General: She is not in acute distress.     Appearance: She is well-developed.      Comments: Hard of hearing   HENT:      Head: Normocephalic and atraumatic.       Mouth/Throat:      Mouth: Mucous membranes are moist.      Pharynx: No oropharyngeal exudate.   Eyes:      Extraocular Movements: Extraocular movements intact.      Conjunctiva/sclera: Conjunctivae normal.      Pupils: Pupils are equal, round, and reactive to light.   Cardiovascular:      Rate and Rhythm: Normal rate and regular rhythm.      Heart sounds: Normal heart sounds.   Pulmonary:      Effort: Pulmonary effort is normal. No respiratory distress.      Breath sounds: Normal breath sounds. No wheezing or rales.   Abdominal:      General: Bowel sounds are normal. There is no distension.      Palpations: Abdomen is soft.      Tenderness: There is no abdominal tenderness.      Comments: Candida intertrigo noted to gluteal folds, groin, lower extremities and feet   Musculoskeletal:         General: No swelling or tenderness. Normal range of motion.      Cervical back: Normal range of motion and neck supple.   Lymphadenopathy:      Cervical: No cervical adenopathy.   Skin:     General: Skin is warm and dry.      Capillary Refill: Capillary refill takes less than 2 seconds.      Findings: Erythema and rash (upper and lower extremities, trunk, and back (improved from previous) present.   Neurological:      Mental Status: She is alert and oriented to person, place, and time.      Cranial Nerves: No cranial nerve deficit.      Sensory: No sensory deficit.      Coordination: Coordination normal.   Psychiatric:         Behavior: Behavior normal.         Thought Content: Thought content normal.         Judgment: Judgment normal.                     Significant Labs: All pertinent labs within the past 24 hours have been reviewed.    Significant Imaging: I have reviewed all pertinent imaging results/findings within the past 24 hours.

## 2023-01-17 NOTE — PT/OT/SLP PROGRESS
"Physical Therapy   Progress Note    Patient Name:  Jenniffer Jimenez  MRN: [id number]    Admit Date: 1/4/2023  Admitting Diagnosis:  Cellulitis of right lower extremity  Length of Stay: 8 days  Recent Surgery: * No surgery found *      Recommendations:     Discharge Recommendations: Skilled Nursing Facility   Equipment recommendations: shower chair  Barriers to discharge: Decreased caregiver support available , Increased level of skilled assistance required, and Fall risk     Assessment:     Jenniffer Jimenez is a 89 y.o. female admitted to [facility] on 1/4/2023 with medical diagnosis of Cellulitis of right lower extremity. Pt presents with weakness, impaired endurance, impaired self care skills, impaired functional mobility, gait instability, impaired balance, impaired cognition, decreased coordination, decreased lower extremity function, decreased upper extremity function, decreased safety awareness, impaired skin, impaired coordination. Pt is progressing towards goals, but has not yet reached prior level of function.   Pt agreeable to therapy session. Assisted pt with donning hearing aide as pt is [place]. Pt able to transfer to bedside commode via ambulation with RW. Pt requires Min with progression to ModA during gait due to increased forward flexion and decreased step length. Pt began to get anxious while walking, stating she was "falling" and started to cry. Assisted pt to commode and educated her on safety measures (walker, 2 skilled therapists, gait belt) used to keep her safe. Will continue to assess functional mobility.  Jenniffer Jimenez would benefit from continued acute PT intervention to improve quality of life, focus on recovery of impairments, provide patient/caregiver education, reduce fall risk, and maximize (I) and safety with functional mobility. Once medically stable, recommending pt discharge to Skilled Nursing Facility .      Rehab Prognosis: Good    Plan:     During this hospitalization, patient " "to be seen 3 x/week to address the identified rehab impairments via gait training, therapeutic activities, therapeutic exercises, neuromuscular re-education and progress towards stated goals.     Plan of Care Expires:  02/08/23  Plan of Care reviewed with: patient    This plan of care has been discussed with the patient/caregiver, who was included in its development and is in agreement with the identified goals and treatment plan.     Subjective     Communicated with RN prior to session.  Patient found supine upon PT entry to room, agreeable to therapy session. Pt alone during session.    Patient/Family Comments/goals: "I really want to go down the street. I will crawl there"    Pain/Comfort:  Pain Rating 1: 0/10  Pain Rating Post-Intervention 1: 0/10    Patients cultural, spiritual, [employer]al conflicts given the current situation: None identified     Objective:   OT present for cotreat due to pt's multiple medical comorbidities and functional/cognition deficits requiring two skilled therapists to appropriately progress pt's musculoskeletal strength, neuromuscular control, and endurance while taking into consideration medical acuity and pt safety.    Patient found with: bed alarm    General Precautions: Standard, fall   Orthopedic Precautions:N/A   Braces: N/A   Oxygen Device: room air    Cognition:  Pt is Alert during session.    Therapist provided skilled verbal and tactile cueing to facilitate the following functional mobility tasks. Listed tasks are focused on recovery of impairments and improving pt's independence and efficiency with bed mobility, transfers and ambulation as able.     Bed Mobility:  Supine > Sit: Stand-by Assistance  Sit > Supine: Minimal Assistance  Assist with LLE    Transfers:   Sit <> Stand Transfer: Minimal Assistance from eob with RW AD   Minimal Assistance from BSC with RW AD   Bed <> Chair: Moderate Assistance with RW AD   Decreased safety awareness  Guided hand placements d/t safety   "               Gait:  Distance: 10 ft + 10 ft = 20 ft  Assistance level: Min with progression to Mod  Assistive Device: rolling walker  Gait Assessment: decreased step length , decreased marco, decreased gait speed, narrow base of support, and unsteady gait     Balance:  Dynamic Sitting: GOOD: Maintains balance through MODERATE excursions of active trunk movement  Standing:  Static: POOR+: Needs MINIMAL assist to maintain   Dynamic: POOR: Needs MOD (moderate) assist during gait    Outcome Measure: AM-PAC 6 CLICK MOBILITY  Total Score:16     Patient/Caregiver Education and Additional Therapeutic Activities/Exercises       Provided pt/caregiver education regarding:   PT POC and goals for therapy   Safety with mobility and fall risk   Safe management of AD as needed   Energy conservation techniques   Instruction on use of call button and importance of calling nursing staff for assistance with mobility     Patient/caregiver able to verbalize understanding; will follow-up with pt/caregiver during current admit for additional questions/concerns within scope of practice.     White board updated.     Patient left supine with all lines intact, call button in reach, and bed alarm on.    Goals:     Multidisciplinary Problems       Physical Therapy Goals          Problem: Physical Therapy    Goal Priority Disciplines Outcome Goal Variances Interventions   Physical Therapy Goal     PT, PT/OT Ongoing, Progressing     Description: Goals to be met by: 23     Patient will increase functional independence with mobility by performin. Sit to stand transfer with Stand-by Assistance  2. Gait  x 100 feet with Stand-by Assistance using LRAD.   3. Stand for 6 minutes with Stand-by Assistance using LRAD while performing dynamic tasks.                         Time Tracking:       PT Received On: 23  PT Start Time: 1416     PT Stop Time: 1439  PT Total Time (min): 23 min     Billable Minutes: Gait Training  23    01/17/2023

## 2023-01-17 NOTE — PT/OT/SLP PROGRESS
Occupational Therapy   Co-Treatment  Co-treatment with PT for maximal pt participation, safety, and activity tolerance     Name: Jenniffer Jimenez  MRN: [id number]  Admitting Diagnosis:  Cellulitis of right lower extremity       Recommendations:     Discharge Recommendations: nursing facility, skilled  Discharge Equipment Recommendations:  shower chair  Barriers to discharge:  Inaccessible home environment, Decreased caregiver support    Assessment:     Jenniffer Jimenez is a 89 y.o. female with a medical diagnosis of Cellulitis of right lower extremity.  She presents with impaired ADL and mobility performance deficits. Pt found upright in bed and agreeable for therapy. Pt [place] requiring increased time for communication with pt eager to participate. Pt session focused on mobility, toileting, and safe return to bed using RW. Pt with increased fear of falling and noted progressive weakness during gait training requiring min-mod A (kyphotic posturing, decreased foot clearance, voiced fear). Pt would benefit from continued OT skilled services 3x/wk to improve daily living skills to optimize QOL. Pt is recommended to discharge to SNF at this time.    Performance deficits affecting function are weakness, gait instability, impaired endurance, impaired balance, impaired self care skills, impaired functional mobility, pain, decreased safety awareness, decreased lower extremity function.     Rehab Prognosis:  Good; patient would benefit from acute skilled OT services to address these deficits and reach maximum level of function.       Plan:     Patient to be seen 4 x/week to address the above listed problems via self-care/home management, therapeutic activities, therapeutic exercises  Plan of Care Expires: 02/09/23  Plan of Care Reviewed with: patient    Subjective     Pain/Comfort:  Pain Rating 1: 0/10  Pain Rating Post-Intervention 1: 0/10    Objective:     Communicated with: RN prior to session.  Patient found HOB  elevated with bed alarm upon OT entry to room.    General Precautions: Standard, fall    Orthopedic Precautions:N/A  Braces: N/A  Respiratory Status: Room air     Occupational Performance:     Bed Mobility:    Patient completed Rolling/Turning to Left with  stand by assistance  Patient completed Scooting/Bridging with stand by assistance  Patient completed Supine to Sit with stand by assistance  Patient completed Sit to Supine with minimum assistance     Functional Mobility/Transfers:  Patient completed Sit <> Stand Transfer with minimum assistance  with  rolling walker   Patient completed Toilet Transfer Step Transfer technique with minimum assistance with  rolling walker  Functional Mobility: Pt stood with min A from bed quickly progressing to mod A with kyphotic posturing and decreased step length with RW     Activities of Daily Living:  Upper Body Dressing: minimum assistance donning gown  Lower Body Dressing: total assistance donning socks   Toileting: total assistance for standing pericare      [facility] 6 Click ADL: 14    Treatment & Education:  Pt educated on role of occupational therapy, POC, and safety during ADLs and functional mobility. Pt and OT discussed importance of safe, continued mobility to optimize daily living skills. Pt verbalized understanding.   White board updated during session. Pt given instruction to call for medical staff/nurse for assistance.       Patient left HOB elevated with all lines intact, call button in reach, and RN notified    GOALS:   Multidisciplinary Problems       Occupational Therapy Goals          Problem: Occupational Therapy    Goal Priority Disciplines Outcome Interventions   Occupational Therapy Goal     OT, PT/OT Ongoing, Progressing    Description: Goals to be met by: 1/16/2023 (1 week)     Patient will increase functional independence with ADLs by performing:    UE Dressing with Supervision.  LE Dressing with Supervision.  Grooming while standing at sink with Stand-by  Assistance.  Toileting from toilet with Stand-by Assistance for hygiene and clothing management.   Rolling to Bilateral with [place].   Supine to sit with [place].  Step transfer with Stand-by Assistance  Toilet transfer to toilet with Stand-by Assistance.                         Time Tracking:     OT Date of Treatment: 01/17/23  OT Start Time: 1416  OT Stop Time: 1440  OT Total Time (min): 24 min    Billable Minutes:Self Care/Home Management 24 min    OT/ROMERO: OT          1/17/2023

## 2023-01-18 LAB
BACTERIA BLD CULT: NORMAL
BACTERIA BLD CULT: NORMAL
CREAT SERPL-MCNC: 2.1 MG/DL (ref 0.5–1.4)
EST. GFR  (NO RACE VARIABLE): 22 ML/MIN/1.73 M^2

## 2023-01-18 PROCEDURE — 25000003 PHARM REV CODE 250: Mod: HCNC | Performed by: HOSPITALIST

## 2023-01-18 PROCEDURE — 63600175 PHARM REV CODE 636 W HCPCS: Mod: HCNC | Performed by: PHYSICIAN ASSISTANT

## 2023-01-18 PROCEDURE — 93005 ELECTROCARDIOGRAM TRACING: CPT | Mod: HCNC

## 2023-01-18 PROCEDURE — 25000003 PHARM REV CODE 250: Mod: HCNC

## 2023-01-18 PROCEDURE — 36415 COLL VENOUS BLD VENIPUNCTURE: CPT | Mod: HCNC | Performed by: HOSPITALIST

## 2023-01-18 PROCEDURE — 93010 ELECTROCARDIOGRAM REPORT: CPT | Mod: HCNC,,, | Performed by: INTERNAL MEDICINE

## 2023-01-18 PROCEDURE — 63600175 PHARM REV CODE 636 W HCPCS: Mod: HCNC | Performed by: HOSPITALIST

## 2023-01-18 PROCEDURE — 25000003 PHARM REV CODE 250: Mod: HCNC | Performed by: PHYSICIAN ASSISTANT

## 2023-01-18 PROCEDURE — 93010 EKG 12-LEAD: ICD-10-PCS | Mod: HCNC,,, | Performed by: INTERNAL MEDICINE

## 2023-01-18 PROCEDURE — 99233 SBSQ HOSP IP/OBS HIGH 50: CPT | Mod: HCNC,,,

## 2023-01-18 PROCEDURE — 82565 ASSAY OF CREATININE: CPT | Mod: HCNC | Performed by: HOSPITALIST

## 2023-01-18 PROCEDURE — 27000207 HC ISOLATION: Mod: HCNC

## 2023-01-18 PROCEDURE — 11000001 HC ACUTE MED/SURG PRIVATE ROOM: Mod: HCNC

## 2023-01-18 PROCEDURE — 99233 PR SUBSEQUENT HOSPITAL CARE,LEVL III: ICD-10-PCS | Mod: HCNC,,,

## 2023-01-18 RX ORDER — SODIUM CHLORIDE 9 MG/ML
INJECTION, SOLUTION INTRAVENOUS CONTINUOUS
Status: ACTIVE | OUTPATIENT
Start: 2023-01-18 | End: 2023-01-19

## 2023-01-18 RX ADMIN — PRAVASTATIN SODIUM 40 MG: 40 TABLET ORAL at 09:01

## 2023-01-18 RX ADMIN — SODIUM CHLORIDE: 9 INJECTION, SOLUTION INTRAVENOUS at 06:01

## 2023-01-18 RX ADMIN — POLYETHYLENE GLYCOL 3350 17 G: 17 POWDER, FOR SOLUTION ORAL at 10:01

## 2023-01-18 RX ADMIN — DICLOFENAC SODIUM 2 G: 10 GEL TOPICAL at 09:01

## 2023-01-18 RX ADMIN — DICLOFENAC SODIUM 2 G: 10 GEL TOPICAL at 10:01

## 2023-01-18 RX ADMIN — ENOXAPARIN SODIUM 40 MG: 40 INJECTION SUBCUTANEOUS at 06:01

## 2023-01-18 RX ADMIN — SENNOSIDES 8.6 MG: 8.6 TABLET, FILM COATED ORAL at 09:01

## 2023-01-18 RX ADMIN — ASPIRIN 81 MG: 81 TABLET, COATED ORAL at 09:01

## 2023-01-18 RX ADMIN — POLYETHYLENE GLYCOL 3350 17 G: 17 POWDER, FOR SOLUTION ORAL at 09:01

## 2023-01-18 RX ADMIN — PREDNISONE 20 MG: 20 TABLET ORAL at 09:01

## 2023-01-18 RX ADMIN — ACETAMINOPHEN 1000 MG: 500 TABLET ORAL at 05:01

## 2023-01-18 RX ADMIN — SPIRONOLACTONE 25 MG: 25 TABLET, FILM COATED ORAL at 09:01

## 2023-01-18 RX ADMIN — TRIAMCINOLONE ACETONIDE: 1 OINTMENT TOPICAL at 09:01

## 2023-01-18 RX ADMIN — MICONAZOLE NITRATE 2 % TOPICAL POWDER: at 09:01

## 2023-01-18 RX ADMIN — MICONAZOLE NITRATE 2 % TOPICAL POWDER: at 10:01

## 2023-01-18 RX ADMIN — SILVER SULFADIAZINE: 10 CREAM TOPICAL at 10:01

## 2023-01-18 RX ADMIN — SILVER SULFADIAZINE: 10 CREAM TOPICAL at 09:01

## 2023-01-18 RX ADMIN — OLANZAPINE 2.5 MG: 2.5 TABLET, FILM COATED ORAL at 10:01

## 2023-01-18 RX ADMIN — TRIAMCINOLONE ACETONIDE: 1 OINTMENT TOPICAL at 10:01

## 2023-01-18 RX ADMIN — ACETAMINOPHEN 1000 MG: 500 TABLET ORAL at 03:01

## 2023-01-18 RX ADMIN — FUROSEMIDE 20 MG: 20 TABLET ORAL at 09:01

## 2023-01-18 NOTE — PLAN OF CARE
01/18/23 1159   Post-Acute Status   Post-Acute Authorization Placement   Post-Acute Placement Status Referrals Sent  (Awaiting accepting skilled facility)   Hospital Resources/Appts/Education Provided   (Will need ID in 7 to 10 days. PCP, Dermatology, Wound ([facility]i) in three weeks post discharge.)

## 2023-01-19 LAB
ANION GAP SERPL CALC-SCNC: 8 MMOL/L (ref 8–16)
BASOPHILS # BLD AUTO: 0.02 K/UL (ref 0–0.2)
BASOPHILS NFR BLD: 0.4 % (ref 0–1.9)
BUN SERPL-MCNC: 69 MG/DL (ref 8–23)
CALCIUM SERPL-MCNC: 8.2 MG/DL (ref 8.7–10.5)
CHLORIDE SERPL-SCNC: 110 MMOL/L (ref 95–110)
CO2 SERPL-SCNC: 21 MMOL/L (ref 23–29)
CREAT SERPL-MCNC: 1.9 MG/DL (ref 0.5–1.4)
DIFFERENTIAL METHOD: ABNORMAL
EOSINOPHIL # BLD AUTO: 0.1 K/UL (ref 0–0.5)
EOSINOPHIL NFR BLD: 1.5 % (ref 0–8)
ERYTHROCYTE [DISTWIDTH] IN BLOOD BY AUTOMATED COUNT: 17.2 % (ref 11.5–14.5)
EST. GFR  (NO RACE VARIABLE): 24.8 ML/MIN/1.73 M^2
GLUCOSE SERPL-MCNC: 84 MG/DL (ref 70–110)
HCT VFR BLD AUTO: 28.7 % (ref 37–48.5)
HGB BLD-MCNC: 9.3 G/DL (ref 12–16)
IMM GRANULOCYTES # BLD AUTO: 0.08 K/UL (ref 0–0.04)
IMM GRANULOCYTES NFR BLD AUTO: 1.5 % (ref 0–0.5)
LYMPHOCYTES # BLD AUTO: 1.2 K/UL (ref 1–4.8)
LYMPHOCYTES NFR BLD: 21.7 % (ref 18–48)
MCH RBC QN AUTO: 28.6 PG (ref 27–31)
MCHC RBC AUTO-ENTMCNC: 32.4 G/DL (ref 32–36)
MCV RBC AUTO: 88 FL (ref 82–98)
MONOCYTES # BLD AUTO: 0.6 K/UL (ref 0.3–1)
MONOCYTES NFR BLD: 11.9 % (ref 4–15)
NEUTROPHILS # BLD AUTO: 3.4 K/UL (ref 1.8–7.7)
NEUTROPHILS NFR BLD: 63 % (ref 38–73)
NRBC BLD-RTO: 0 /100 WBC
PLATELET # BLD AUTO: 231 K/UL (ref 150–450)
PMV BLD AUTO: 11.1 FL (ref 9.2–12.9)
POCT GLUCOSE: 143 MG/DL (ref 70–110)
POCT GLUCOSE: 167 MG/DL (ref 70–110)
POCT GLUCOSE: 79 MG/DL (ref 70–110)
POCT GLUCOSE: 90 MG/DL (ref 70–110)
POTASSIUM SERPL-SCNC: 5.3 MMOL/L (ref 3.5–5.1)
RBC # BLD AUTO: 3.25 M/UL (ref 4–5.4)
SODIUM SERPL-SCNC: 139 MMOL/L (ref 136–145)
WBC # BLD AUTO: 5.38 K/UL (ref 3.9–12.7)

## 2023-01-19 PROCEDURE — 97116 GAIT TRAINING THERAPY: CPT | Mod: HCNC

## 2023-01-19 PROCEDURE — 93010 ELECTROCARDIOGRAM REPORT: CPT | Mod: HCNC,,, | Performed by: INTERNAL MEDICINE

## 2023-01-19 PROCEDURE — 97535 SELF CARE MNGMENT TRAINING: CPT | Mod: HCNC

## 2023-01-19 PROCEDURE — 25000003 PHARM REV CODE 250: Mod: HCNC

## 2023-01-19 PROCEDURE — 97530 THERAPEUTIC ACTIVITIES: CPT | Mod: HCNC

## 2023-01-19 PROCEDURE — 99233 PR SUBSEQUENT HOSPITAL CARE,LEVL III: ICD-10-PCS | Mod: HCNC,,,

## 2023-01-19 PROCEDURE — 27000207 HC ISOLATION: Mod: HCNC

## 2023-01-19 PROCEDURE — 93005 ELECTROCARDIOGRAM TRACING: CPT | Mod: HCNC

## 2023-01-19 PROCEDURE — 93010 EKG 12-LEAD: ICD-10-PCS | Mod: HCNC,,, | Performed by: INTERNAL MEDICINE

## 2023-01-19 PROCEDURE — 63600175 PHARM REV CODE 636 W HCPCS: Mod: HCNC | Performed by: HOSPITALIST

## 2023-01-19 PROCEDURE — 85025 COMPLETE CBC W/AUTO DIFF WBC: CPT | Mod: HCNC

## 2023-01-19 PROCEDURE — 80048 BASIC METABOLIC PNL TOTAL CA: CPT | Mod: HCNC

## 2023-01-19 PROCEDURE — 36415 COLL VENOUS BLD VENIPUNCTURE: CPT | Mod: HCNC

## 2023-01-19 PROCEDURE — 11000001 HC ACUTE MED/SURG PRIVATE ROOM: Mod: HCNC

## 2023-01-19 PROCEDURE — 99233 SBSQ HOSP IP/OBS HIGH 50: CPT | Mod: HCNC,,,

## 2023-01-19 PROCEDURE — 25000003 PHARM REV CODE 250: Mod: HCNC | Performed by: PHYSICIAN ASSISTANT

## 2023-01-19 RX ORDER — ENOXAPARIN SODIUM 100 MG/ML
30 INJECTION SUBCUTANEOUS EVERY 24 HOURS
Status: DISCONTINUED | OUTPATIENT
Start: 2023-01-19 | End: 2023-01-24 | Stop reason: HOSPADM

## 2023-01-19 RX ADMIN — SILVER SULFADIAZINE: 10 CREAM TOPICAL at 10:01

## 2023-01-19 RX ADMIN — SODIUM CHLORIDE 250 ML: 9 INJECTION, SOLUTION INTRAVENOUS at 03:01

## 2023-01-19 RX ADMIN — SILVER SULFADIAZINE: 10 CREAM TOPICAL at 09:01

## 2023-01-19 RX ADMIN — ACETAMINOPHEN 1000 MG: 500 TABLET ORAL at 01:01

## 2023-01-19 RX ADMIN — OLANZAPINE 2.5 MG: 2.5 TABLET, FILM COATED ORAL at 10:01

## 2023-01-19 RX ADMIN — MICONAZOLE NITRATE 2 % TOPICAL POWDER: at 09:01

## 2023-01-19 RX ADMIN — ACETAMINOPHEN 1000 MG: 500 TABLET ORAL at 05:01

## 2023-01-19 RX ADMIN — POLYETHYLENE GLYCOL 3350 17 G: 17 POWDER, FOR SOLUTION ORAL at 10:01

## 2023-01-19 RX ADMIN — TRIAMCINOLONE ACETONIDE: 1 OINTMENT TOPICAL at 10:01

## 2023-01-19 RX ADMIN — DICLOFENAC SODIUM 2 G: 10 GEL TOPICAL at 10:01

## 2023-01-19 RX ADMIN — SENNOSIDES 8.6 MG: 8.6 TABLET, FILM COATED ORAL at 09:01

## 2023-01-19 RX ADMIN — ACETAMINOPHEN 1000 MG: 500 TABLET ORAL at 12:01

## 2023-01-19 RX ADMIN — ENOXAPARIN SODIUM 30 MG: 100 INJECTION SUBCUTANEOUS at 05:01

## 2023-01-19 RX ADMIN — TRIAMCINOLONE ACETONIDE: 1 OINTMENT TOPICAL at 09:01

## 2023-01-19 RX ADMIN — ASPIRIN 81 MG: 81 TABLET, COATED ORAL at 09:01

## 2023-01-19 RX ADMIN — MICONAZOLE NITRATE 2 % TOPICAL POWDER: at 10:01

## 2023-01-19 RX ADMIN — DICLOFENAC SODIUM 2 G: 10 GEL TOPICAL at 09:01

## 2023-01-19 RX ADMIN — PRAVASTATIN SODIUM 40 MG: 40 TABLET ORAL at 09:01

## 2023-01-19 RX ADMIN — ACETAMINOPHEN 1000 MG: 500 TABLET ORAL at 06:01

## 2023-01-19 RX ADMIN — SODIUM CHLORIDE 500 ML: 9 INJECTION, SOLUTION INTRAVENOUS at 09:01

## 2023-01-19 NOTE — PROGRESS NOTES
[facility] [address] [place] [address] Medicine  Progress Note    Patient Name: Jenniffer Jimenez  MRN: [id number]  Patient Class: IP- Inpatient   Admission Date: 1/4/2023  Length of Stay: 10 days  Attending Physician: Nely Chahal MD  Primary Care Provider: Rubi Young DO        Subjective:     Principal Problem:Cellulitis of right lower extremity        HPI:  Jenniffer Jimenez is a 89 y.o. female with a PMHx of HLD, HTN, AF s/p watchman, chronic cellulitis/ wound of her right lower extremity who [place] [facility] [place] worsening redness and pain to her right lwoer extremity. Patient is a poor historian and has difficulty hearing. Per ED note, patient receives wound care for chronic RLE cellulitis. Wound care nuse noted worsening redness, weeping, and pain to her RLE. Apparently patient was supposed to be on ciprofloxacin but had a bad reaction so she has been off of it for the past 2 weeks.  The patient and has not had an alternative antibiotic prescribed but reportedly is supposed to be on an antibiotic. Patient also complains of intermittent shortness of breath recently which mostly occurs on exertion. Denies any fever, nausea, vomiting, chest pain, numbness/tingling, weakness, slurred speech, or recent falls.     ED: AFVSS. No leukocytosis. K 5.9, Cr 1.5 (BL ~1.2). EKG, CXR pending. Given IV vanc.      Overview/Hospital Course:  Pt placed in observation for management of worsening RLE cellulitis. Pt received IV vancomycin and ciprofloxacin initially, vancomycin discontinued. Derm consulted for worsening rash, ciprofloxacin discontinued for suspected drug reaction, and the pt was started on doxycycline. Psych consulted for worsening delirium and agitation and pt returned to mental baseline. ID consulted for worsening cellulitis. ID recommends stopping abx and treating with antifungals and steroids. JOSHUA, improving s/p IVF, will continue IVF. Plan to discharge to SNF when medically stable.        Interval History:   DEMETRIS, RADHASAF on my exam. Yesterday patient developing JOSHUA. Given IVF overnight and 500cc NS today. Will continue IVF overnight. Recheck Cr on daily BMP. Continue anti-fungal powder, steroids, and wound care. CBC without leukocytosis. CMP without emergent abnormalities. Patient without further complaints.     Review of Systems   Constitutional:  Negative for appetite change, chills and fever.   HENT:  Negative for congestion, trouble swallowing and voice change.    Eyes:  Negative for photophobia and visual disturbance.   Respiratory:  Negative for cough, chest tightness and wheezing.    Cardiovascular:  Positive for leg swelling. Negative for chest pain and palpitations.   Gastrointestinal:  Negative for abdominal pain, constipation, diarrhea, nausea and vomiting.   Endocrine: Negative for polyphagia and polyuria.   Genitourinary:  Negative for decreased urine volume, difficulty urinating, dysuria, flank pain and hematuria.   Musculoskeletal:  Positive for arthralgias (R hip). Negative for back pain and gait problem.   Skin:  Positive for color change and wound.   Neurological:  Negative for dizziness, seizures, syncope, weakness, numbness and headaches.   Psychiatric/Behavioral:  Negative for agitation, behavioral problems and confusion.    Objective:     Vital Signs (Most Recent):  Temp: 96.6 °F (35.9 °C) (01/19/23 1516)  Pulse: 77 (01/19/23 1516)  Resp: 18 (01/19/23 1516)  BP: (!) 118/56 (01/19/23 1516)  SpO2: 98 % (01/19/23 1516)   Vital Signs (24h Range):  Temp:  [96.6 °F (35.9 °C)-98 °F (36.7 °C)] 96.6 °F (35.9 °C)  Pulse:  [49-77] 77  Resp:  [18] 18  SpO2:  [96 %-99 %] 98 %  BP: ()/(49-68) 118/56     Weight: 76.8 kg (169 lb 5 oz)  Body mass index is 28.18 kg/m².  No intake or output data in the 24 hours ending 01/19/23 1722   Physical Exam  Vitals and nursing note reviewed.   Constitutional:       General: She is not in acute distress.     Appearance: She is well-developed.       Comments: Hard of hearing   HENT:      Head: Normocephalic and atraumatic.      Mouth/Throat:      Mouth: Mucous membranes are moist.      Pharynx: No oropharyngeal exudate.   Eyes:      Extraocular Movements: Extraocular movements intact.      Conjunctiva/sclera: Conjunctivae normal.      Pupils: Pupils are equal, round, and reactive to light.   Cardiovascular:      Rate and Rhythm: Normal rate and regular rhythm.      Heart sounds: Normal heart sounds.   Pulmonary:      Effort: Pulmonary effort is normal. No respiratory distress.      Breath sounds: Normal breath sounds. No wheezing or rales.   Abdominal:      General: Bowel sounds are normal. There is no distension.      Palpations: Abdomen is soft.      Tenderness: There is no abdominal tenderness.      Comments: Candida intertrigo noted to gluteal folds, groin, lower extremities and feet   Musculoskeletal:         General: No swelling or tenderness. Normal range of motion.      Cervical back: Normal range of motion and neck supple.   Lymphadenopathy:      Cervical: No cervical adenopathy.   Skin:     General: Skin is warm and dry.      Capillary Refill: Capillary refill takes less than 2 seconds.      Findings: Erythema and rash (upper and lower extremities, trunk, and back (improved from previous) present.   Neurological:      Mental Status: She is alert and oriented to person, place, and time.      Cranial Nerves: No cranial nerve deficit.      Sensory: No sensory deficit.      Coordination: Coordination normal.   Psychiatric:         Behavior: Behavior normal.         Thought Content: Thought content normal.         Judgment: Judgment normal.       Significant Labs: All pertinent labs within the past 24 hours have been reviewed.  CBC:   Recent Labs   Lab 01/19/23  0557   WBC 5.38   HGB 9.3*   HCT 28.7*        CMP:   Recent Labs   Lab 01/18/23  1329 01/19/23  0557   NA  --  139   K  --  5.3*   CL  --  110   CO2  --  21*   GLU  --  84   BUN  --  69*    CREATININE 2.1* 1.9*   CALCIUM  --  8.2*   ANIONGAP  --  8       Significant Imaging: I have reviewed all pertinent imaging results/findings within the past 24 hours.      Assessment/Plan:      * Cellulitis of right lower extremity  - acute on chronic issue  - afebrile without leukocytosis  - ESR, CRP elevated, lactic downtrending  - LE US negative for DVT  - s/p IV vanc x3  - cipro discontinued for suspected drug eruption  - continue doxy for gram neg and pseudomonas coverage  - Pain control with scheduled tylenol, prn oxy 5 q6h and oxy 10 mg q6 prn (to be used with dressing changes)    - bowel regimen in place to prevent opioid induced constipation  - wound care consulted, continue BID dressing changes by nursing   -- ana added to promote wound healing  - elevate extremity  - Wound cultures + few MRSA - discussed with ID who recommended continuing steroids and antifungals as RLE continues to improve  - ID consulted, appreciate recs:    As per prior recs, would monitor off of abx at this time. Complete steroids as planned    Stop fluconazole given recent drug rash and start antifungal powder to affected areas   Continue local wound care to RLE and judicious leg elevation    Continue steroid cream for rash   Rec vasc Sx fu for eval of venous insufficiency in RLE   FU in 7-10 in ID clinic    High anion gap metabolic acidosis  Resolved  - Likely secondary to infection   - Improving s/p 500 cc LR   - Continue to monitor on daily labs    Drug eruption  - rash developing over upper lower extremities and abdomen  - nonspecific skin eruption vs medication reaction  - dermatology consulted and recommend d/c cipro & start triamcinolone cream to entire body BID x 14 days    Delirium  RESOLVED  - discontinued benadryl and hydroxyzine given delirium overnight   - overnight 01/09-01/10 - patient hitting technician   -- requiring IM Zyprexa x 1 and restraints  - overnight 01/10-01/11 - patient hitting technician despite  oral zyprexa yesterday  - psychiatry consulted, will follow recs  - nightly zyprexa decreased to 2.5 mg per psychiatry     Intertrigo  - Continue nystatin powder    Trochanteric bursitis of right hip  - Scheduled tylenol  - Lidocaine jelly  - Voltaren gel     Hyperkalemia  Stage 3b chronic kidney disease  - overnight 01/05 K 6.6 - given calcium gluconate, albuterol, and lokelma  - scheduled lokelma discontinued  - monitor with labs  - monitor on tele    JOSHUA (acute kidney injury)  - Creatinine 1.5 on admission, Cr today Estimated Creatinine Clearance: 20.2 mL/min (A) (based on SCr of 1.9 mg/dL (H)). (baseline 1.2)  - hold home lasix and aldactone  - cIVFs overnight, 500cc NS throughout day, will continue IVF overnight  - 1/19 - Cr 2.1 --> 1.9  - Continue to monitor on daily labs    Overweight (BMI 25.0-29.9)  Body mass index is 28.18 kg/m².   - Morbid obesity complicates all aspects of disease management from diagnostic modalities to treatment.  - Weight loss encouraged and health benefits explained to patient.    Chronic diastolic heart failure  - Pt complaining of occasional SOB with volume overload  -   - CXR with coarse interstitial lung markings and cardiomegaly   - last echo below  - Continue home lasix once JOSHUA resolved  - strict I/Os, daily weights    Results for orders placed during the hospital encounter of 10/07/22  Echo  Interpretation Summary  · The left ventricle is normal in size with concentric remodeling and normal systolic function.  · The estimated ejection fraction is 55-60%.  · Grade III left ventricular diastolic dysfunction.  · Mild mitral regurgitation.  · Normal right ventricular size with mildly reduced right ventricular systolic function.  · Severe tricuspid regurgitation.  · The estimated PA systolic pressure is 52 mmHg. PASP may be under-estimated due to TR severity.  · Elevated central venous pressure (15 mmHg).  · There is pulmonary hypertension.  · Severe left atrial  enlargement.    Chronic atrial fibrillation  - no longer on eliquis s/p Watchman implantation    Debility  Gait instability  - Discussed plan of care with patient and daughter at bedside who report significant weakness and debility with acute difficulty performing ADLs over the past few weeks and throughout admission  - PT/OT consulted   -- recommending SNF and shower chair at d/c    Primary hypertension  - Continue to monitor  - Holding home lasix and aldactone given JOSHUA    Pure hypercholesterolemia  - continue ASA, statin       VTE Risk Mitigation (From admission, onward)         Ordered     enoxaparin injection 30 mg  Daily         01/19/23 1425     IP VTE HIGH RISK PATIENT  Once         01/05/23 0040                Discharge Planning   GILES: 1/23/2023     Code Status: Full Code   Is the patient medically ready for discharge?: No    Reason for patient still in hospital (select all that apply): Patient trending condition and Pending disposition  Discharge Plan A: Skilled Nursing Facility   Discharge Delays: (!) Change in Medical Condition (JOSHUA)              Paul Curtis PA-C  Department of Hospital Medicine   [facility] 11H

## 2023-01-19 NOTE — PROGRESS NOTES
[facility] [address] [place] [address] Medicine  Progress Note    Patient Name: Jenniffer Jimenez  MRN: [id number]  Patient Class: IP- Inpatient   Admission Date: 1/4/2023  Length of Stay: 9 days  Attending Physician: Nely Ruiz MD  Primary Care Provider: Rubi Young DO        Subjective:     Principal Problem:Cellulitis of right lower extremity        HPI:  Jenniffer Jimenez is a 89 y.o. female with a PMHx of HLD, HTN, AF s/p watchman, chronic cellulitis/ wound of her right lower extremity who [place] [facility] [place] of worsening redness and pain to her right lwoer extremity. Patient is a poor historian and has difficulty hearing. Per ED note, patient receives wound care for chronic RLE cellulitis. Wound care nuse noted worsening redness, weeping, and pain to her RLE. Apparently patient was supposed to be on ciprofloxacin but had a bad reaction so she has been off of it for the past 2 weeks.  The patient and has not had an alternative antibiotic prescribed but reportedly is supposed to be on an antibiotic. Patient also complains of intermittent shortness of breath recently which mostly occurs on exertion. Denies any fever, nausea, vomiting, chest pain, numbness/tingling, weakness, slurred speech, or recent falls.     ED: AFVSS. No leukocytosis. K 5.9, Cr 1.5 (BL ~1.2). EKG, CXR pending. Given IV vanc.      Overview/Hospital Course:  Pt placed in observation for management of worsening RLE cellulitis. Pt received IV vancomycin and ciprofloxacin initially, vancomycin discontinued. Derm consulted for worsening rash, ciprofloxacin discontinued for suspected drug reaction, and the pt was started on doxycycline. Psych consulted for worsening delirium and agitation and pt returned to mental baseline. ID consulted for worsening cellulitis. ID recommends stopping abx and treating with antifungals and steroids. Plan to discharge to SNF when medically stable.       Interval History: Pt seen and examined by  me this morning. FRANK. DEMETRIS. Pt reports pain well controlled on current regimen, diffuse body rash improving. Plan to continue steroids to completion. New JOSHUA with Cr 2.1. Will order IVFs and hold home lasix and aldactone.     Review of Systems   Constitutional:  Negative for appetite change, chills and fever.   HENT:  Negative for congestion, trouble swallowing and voice change.    Eyes:  Negative for photophobia and visual disturbance.   Respiratory:  Negative for cough, chest tightness and wheezing.    Cardiovascular:  Positive for leg swelling. Negative for chest pain and palpitations.   Gastrointestinal:  Negative for abdominal pain, constipation, diarrhea, nausea and vomiting.   Endocrine: Negative for polyphagia and polyuria.   Genitourinary:  Negative for decreased urine volume, difficulty urinating, dysuria, flank pain and hematuria.   Musculoskeletal:  Positive for arthralgias (R hip). Negative for back pain and gait problem.   Skin:  Positive for color change and wound.   Neurological:  Negative for dizziness, seizures, syncope, weakness, numbness and headaches.   Psychiatric/Behavioral:  Negative for agitation, behavioral problems and confusion.    Objective:     Vital Signs (Most Recent):  Temp: 97.7 °F (36.5 °C) (01/18/23 1533)  Pulse: 71 (01/18/23 1533)  Resp: 18 (01/18/23 1533)  BP: (!) 112/53 (01/18/23 1533)  SpO2: 98 % (01/18/23 1533) Vital Signs (24h Range):  Temp:  [5 °F (-15 °C)-97.8 °F (36.6 °C)] 97.7 °F (36.5 °C)  Pulse:  [50-71] 71  Resp:  [18] 18  SpO2:  [94 %-100 %] 98 %  BP: (107-171)/(52-69) 112/53     Weight: 76.8 kg (169 lb 5 oz)  Body mass index is 28.18 kg/m².  No intake or output data in the 24 hours ending 01/18/23 1812     Physical Exam  Vitals and nursing note reviewed.   Constitutional:       General: She is not in acute distress.     Appearance: She is well-developed.      Comments: Hard of hearing   HENT:      Head: Normocephalic and atraumatic.      Mouth/Throat:      Mouth:  Mucous membranes are moist.      Pharynx: No oropharyngeal exudate.   Eyes:      Extraocular Movements: Extraocular movements intact.      Conjunctiva/sclera: Conjunctivae normal.      Pupils: Pupils are equal, round, and reactive to light.   Cardiovascular:      Rate and Rhythm: Normal rate and regular rhythm.      Heart sounds: Normal heart sounds.   Pulmonary:      Effort: Pulmonary effort is normal. No respiratory distress.      Breath sounds: Normal breath sounds. No wheezing or rales.   Abdominal:      General: Bowel sounds are normal. There is no distension.      Palpations: Abdomen is soft.      Tenderness: There is no abdominal tenderness.      Comments: Candida intertrigo noted to gluteal folds, groin, lower extremities and feet   Musculoskeletal:         General: No swelling or tenderness. Normal range of motion.      Cervical back: Normal range of motion and neck supple.   Lymphadenopathy:      Cervical: No cervical adenopathy.   Skin:     General: Skin is warm and dry.      Capillary Refill: Capillary refill takes less than 2 seconds.      Findings: Erythema and rash (upper and lower extremities, trunk, and back (improved from previous) present.   Neurological:      Mental Status: She is alert and oriented to person, place, and time.      Cranial Nerves: No cranial nerve deficit.      Sensory: No sensory deficit.      Coordination: Coordination normal.   Psychiatric:         Behavior: Behavior normal.         Thought Content: Thought content normal.         Judgment: Judgment normal.                     Significant Labs: All pertinent labs within the past 24 hours have been reviewed.    Significant Imaging: I have reviewed all pertinent imaging results/findings within the past 24 hours.          Assessment/Plan:      * Cellulitis of right lower extremity  - acute on chronic issue  - afebrile without leukocytosis  - ESR, CRP elevated, lactic downtrending  - LE US negative for DVT  - s/p IV vanc x3  -  cipro discontinued for suspected drug eruption  - continue doxy for gram neg and pseudomonas coverage  - Pain control with scheduled tylenol, prn oxy 5 q6h and oxy 10 mg q6 prn (to be used with dressing changes)    - bowel regimen in place to prevent opioid induced constipation  - wound care consulted, continue BID dressing changes by nursing   -- ana added to promote wound healing  - elevate extremity  - Wound cultures + few MRSA - discussed with ID who recommended continuing steroids and antifungals as RLE continues to improve  - ID consulted, appreciate recs:    As per prior recs, would monitor off of abx at this time. Complete steroids as planned    Stop fluconazole given recent drug rash and start antifungal powder to affected areas   Continue local wound care to RLE and judicious leg elevation    Continue steroid cream for rash   Rec vasc Sx fu for eval of venous insufficiency in RLE   FU in 7-10 in ID clinic    High anion gap metabolic acidosis  Resolved  - Likely secondary to infection   - Improving s/p 500 cc LR   - Continue to monitor on daily labs    Drug eruption  - rash developing over upper lower extremities and abdomen  - nonspecific skin eruption vs medication reaction  - dermatology consulted and recommend d/c cipro & start triamcinolone cream to entire body BID x 14 days    Delirium  RESOLVED  - discontinued benadryl and hydroxyzine given delirium overnight   - overnight 01/09-01/10 - patient hitting technician   -- requiring IM Zyprexa x 1 and restraints  - overnight 01/10-01/11 - patient hitting technician despite oral zyprexa yesterday  - psychiatry consulted, will follow recs  - nightly zyprexa decreased to 2.5 mg per psychiatry     Intertrigo  - Continue nystatin powder    Trochanteric bursitis of right hip  - Scheduled tylenol  - Lidocaine jelly  - Voltaren gel     Hyperkalemia  Stage 3b chronic kidney disease  - overnight 01/05 K 6.6 - [facility]con[facility]  - scheduled lokelma discontinued  - monitor with labs  - monitor on tele    JOSHUA (acute kidney injury)  - Creatinine 1.5 on admission, Cr today Estimated Creatinine Clearance: 18.2 mL/min (A) (based on SCr of 2.1 mg/dL (H)). (baseline 1.2)  - hold home lasix and aldactone  - cIVFs overnight   - Continue to monitor on daily labs    Overweight (BMI 25.0-29.9)  Body mass index is 28.18 kg/m².   - Morbid obesity complicates all aspects of disease management from diagnostic modalities to treatment.  - Weight loss encouraged and health benefits explained to patient.    Chronic diastolic heart failure  - Pt complaining of occasional SOB with volume overload  -   - CXR with coarse interstitial lung markings and cardiomegaly   - last echo below  - Continue home lasix  - strict I/Os, daily weights    Results for orders placed during the hospital encounter of 10/07/22  Echo  Interpretation Summary  · The left ventricle is normal in size with concentric remodeling and normal systolic function.  · The estimated ejection fraction is 55-60%.  · Grade III left ventricular diastolic dysfunction.  · Mild mitral regurgitation.  · Normal right ventricular size with mildly reduced right ventricular systolic function.  · Severe tricuspid regurgitation.  · The estimated PA systolic pressure is 52 mmHg. PASP may be under-estimated due to TR severity.  · Elevated central venous pressure (15 mmHg).  · There is pulmonary hypertension.  · Severe left atrial enlargement.    Chronic atrial fibrillation  - no longer on eliquis s/p Watchman implantation    Debility  Gait instability  - Discussed plan of care with patient and daughter at bedside who report significant weakness and debility with acute difficulty performing ADLs over the past few weeks and throughout admission  - PT/OT consulted   -- recommending SNF and shower chair at d/c    Primary hypertension  - Continue to monitor  - Holding home lasix and aldactone given  JOSHUA    Pure hypercholesterolemia  - continue ASA, statin       VTE Risk Mitigation (From admission, onward)         Ordered     enoxaparin injection 40 mg  Daily         01/13/23 1417     IP VTE HIGH RISK PATIENT  Once         01/05/23 0040                Discharge Planning   GILES: 1/19/2023     Code Status: Full Code   Is the patient medically ready for discharge?: No    Reason for patient still in hospital (select all that apply): Patient new problem, Treatment, Imaging and Pending disposition  Discharge Plan A: Skilled Nursing Facility   Discharge Delays:  (Not medically ready for discharge)              Lorena Moore PA-C  Department of Hospital Medicine   [facility] - Observation 11H

## 2023-01-19 NOTE — ASSESSMENT & PLAN NOTE
- Creatinine 1.5 on admission, Cr today Estimated Creatinine Clearance: 18.2 mL/min (A) (based on SCr of 2.1 mg/dL (H)). (baseline 1.2)  - hold home lasix and aldactone  - cIVFs overnight   - Continue to monitor on daily labs

## 2023-01-19 NOTE — PLAN OF CARE
01/19/23 1135   Discharge Reassessment   Assessment Type Discharge Planning Reassessment   Did the patient's condition or plan change since previous assessment? No   Discharge Plan discussed with: Adult children;Patient   Discharge Plan A Skilled Nursing Facility   DME Needed Upon Discharge  bedside commode   Discharge Barriers Identified   (Accepting skilled facility)   Why the patient remains in the hospital Requires continued medical care   Post-Acute Status   Post-Acute Authorization Placement   Post-Acute Placement Status   (Await accepting skilled facility)   Discharge Delays (!) Change in Medical Condition  (JOSHUA)

## 2023-01-19 NOTE — ASSESSMENT & PLAN NOTE
RESOLVED  - discontinued benadryl and hydroxyzine given delirium overnight   - overnight 01/09-01/10 - patient hitting technician   -- requiring IM Zyprexa x 1 and restraints  - overnight 01/10-01/11 - patient hitting technician despite oral zyprexa yesterday  - psychiatry consulted, will follow recs  - nightly zyprexa decreased to 2.5 mg per psychiatry

## 2023-01-19 NOTE — PLAN OF CARE
Spoke with patients medical power of      Paul Duenas [facility] Power of    [phone] granddaughter     Power of  request we contact her to assist her grandmother with decision making.    Reviewed with Granddaughter what is presently patient medical issues and notified her of the three facilities that have offered skilled placement.  [facility]. [phone]

## 2023-01-19 NOTE — SUBJECTIVE & OBJECTIVE
Interval History: Pt seen and examined by me this morning. FRANKRaul DEMETRIS. Pt reports pain well controlled on current regimen, diffuse body rash improving. Plan to continue steroids to completion. New JOSHUA with Cr 2.1. Will order IVFs and hold home lasix and aldactone.     Review of Systems   Constitutional:  Negative for appetite change, chills and fever.   HENT:  Negative for congestion, trouble swallowing and voice change.    Eyes:  Negative for photophobia and visual disturbance.   Respiratory:  Negative for cough, chest tightness and wheezing.    Cardiovascular:  Positive for leg swelling. Negative for chest pain and palpitations.   Gastrointestinal:  Negative for abdominal pain, constipation, diarrhea, nausea and vomiting.   Endocrine: Negative for polyphagia and polyuria.   Genitourinary:  Negative for decreased urine volume, difficulty urinating, dysuria, flank pain and hematuria.   Musculoskeletal:  Positive for arthralgias (R hip). Negative for back pain and gait problem.   Skin:  Positive for color change and wound.   Neurological:  Negative for dizziness, seizures, syncope, weakness, numbness and headaches.   Psychiatric/Behavioral:  Negative for agitation, behavioral problems and confusion.    Objective:     Vital Signs (Most Recent):  Temp: 97.7 °F (36.5 °C) (01/18/23 1533)  Pulse: 71 (01/18/23 1533)  Resp: 18 (01/18/23 1533)  BP: (!) 112/53 (01/18/23 1533)  SpO2: 98 % (01/18/23 1533) Vital Signs (24h Range):  Temp:  [5 °F (-15 °C)-97.8 °F (36.6 °C)] 97.7 °F (36.5 °C)  Pulse:  [50-71] 71  Resp:  [18] 18  SpO2:  [94 %-100 %] 98 %  BP: (107-171)/(52-69) 112/53     Weight: 76.8 kg (169 lb 5 oz)  Body mass index is 28.18 kg/m².  No intake or output data in the 24 hours ending 01/18/23 1812     Physical Exam  Vitals and nursing note reviewed.   Constitutional:       General: She is not in acute distress.     Appearance: She is well-developed.      Comments: Hard of hearing   HENT:      Head: Normocephalic and  atraumatic.      Mouth/Throat:      Mouth: Mucous membranes are moist.      Pharynx: No oropharyngeal exudate.   Eyes:      Extraocular Movements: Extraocular movements intact.      Conjunctiva/sclera: Conjunctivae normal.      Pupils: Pupils are equal, round, and reactive to light.   Cardiovascular:      Rate and Rhythm: Normal rate and regular rhythm.      Heart sounds: Normal heart sounds.   Pulmonary:      Effort: Pulmonary effort is normal. No respiratory distress.      Breath sounds: Normal breath sounds. No wheezing or rales.   Abdominal:      General: Bowel sounds are normal. There is no distension.      Palpations: Abdomen is soft.      Tenderness: There is no abdominal tenderness.      Comments: Candida intertrigo noted to gluteal folds, groin, lower extremities and feet   Musculoskeletal:         General: No swelling or tenderness. Normal range of motion.      Cervical back: Normal range of motion and neck supple.   Lymphadenopathy:      Cervical: No cervical adenopathy.   Skin:     General: Skin is warm and dry.      Capillary Refill: Capillary refill takes less than 2 seconds.      Findings: Erythema and rash (upper and lower extremities, trunk, and back (improved from previous) present.   Neurological:      Mental Status: She is alert and oriented to person, place, and time.      Cranial Nerves: No cranial nerve deficit.      Sensory: No sensory deficit.      Coordination: Coordination normal.   Psychiatric:         Behavior: Behavior normal.         Thought Content: Thought content normal.         Judgment: Judgment normal.                     Significant Labs: All pertinent labs within the past 24 hours have been reviewed.    Significant Imaging: I have reviewed all pertinent imaging results/findings within the past 24 hours.

## 2023-01-19 NOTE — ASSESSMENT & PLAN NOTE
- Pt complaining of occasional SOB with volume overload  -   - CXR with coarse interstitial lung markings and cardiomegaly   - last echo below  - Continue home lasix once JOSHUA resolved  - strict I/Os, daily weights    Results for orders placed during the hospital encounter of 10/07/22  Echo  Interpretation Summary  · The left ventricle is normal in size with concentric remodeling and normal systolic function.  · The estimated ejection fraction is 55-60%.  · Grade III left ventricular diastolic dysfunction.  · Mild mitral regurgitation.  · Normal right ventricular size with mildly reduced right ventricular systolic function.  · Severe tricuspid regurgitation.  · The estimated PA systolic pressure is 52 mmHg. PASP may be under-estimated due to TR severity.  · Elevated central venous pressure (15 mmHg).  · There is pulmonary hypertension.  · Severe left atrial enlargement.

## 2023-01-19 NOTE — PLAN OF CARE
Reviewed POC with pt. Pt ambulated with RW and SBA to bathroom needs assist with getting legs in bed. Pt able to take meds with no difficulty.

## 2023-01-19 NOTE — ASSESSMENT & PLAN NOTE
- Creatinine 1.5 on admission, Cr today Estimated Creatinine Clearance: 20.2 mL/min (A) (based on SCr of 1.9 mg/dL (H)). (baseline 1.2)  - hold home lasix and aldactone  - cIVFs overnight, 500cc NS throughout day, will continue IVF overnight  - 1/19 - Cr 2.1 --> 1.9  - Continue to monitor on daily labs

## 2023-01-19 NOTE — SUBJECTIVE & OBJECTIVE
Interval History:   DEMETRIS, RADHASAF on my exam. Yesterday patient developing JOSHUA. Given IVF overnight and 500cc NS today. Will continue IVF overnight. Recheck Cr on daily BMP. Continue anti-fungal powder, steroids, and wound care. CBC without leukocytosis. CMP without emergent abnormalities. Patient without further complaints.     Review of Systems   Constitutional:  Negative for appetite change, chills and fever.   HENT:  Negative for congestion, trouble swallowing and voice change.    Eyes:  Negative for photophobia and visual disturbance.   Respiratory:  Negative for cough, chest tightness and wheezing.    Cardiovascular:  Positive for leg swelling. Negative for chest pain and palpitations.   Gastrointestinal:  Negative for abdominal pain, constipation, diarrhea, nausea and vomiting.   Endocrine: Negative for polyphagia and polyuria.   Genitourinary:  Negative for decreased urine volume, difficulty urinating, dysuria, flank pain and hematuria.   Musculoskeletal:  Positive for arthralgias (R hip). Negative for back pain and gait problem.   Skin:  Positive for color change and wound.   Neurological:  Negative for dizziness, seizures, syncope, weakness, numbness and headaches.   Psychiatric/Behavioral:  Negative for agitation, behavioral problems and confusion.    Objective:     Vital Signs (Most Recent):  Temp: 96.6 °F (35.9 °C) (01/19/23 1516)  Pulse: 77 (01/19/23 1516)  Resp: 18 (01/19/23 1516)  BP: (!) 118/56 (01/19/23 1516)  SpO2: 98 % (01/19/23 1516)   Vital Signs (24h Range):  Temp:  [96.6 °F (35.9 °C)-98 °F (36.7 °C)] 96.6 °F (35.9 °C)  Pulse:  [49-77] 77  Resp:  [18] 18  SpO2:  [96 %-99 %] 98 %  BP: ()/(49-68) 118/56     Weight: 76.8 kg (169 lb 5 oz)  Body mass index is 28.18 kg/m².  No intake or output data in the 24 hours ending 01/19/23 1722   Physical Exam  Vitals and nursing note reviewed.   Constitutional:       General: She is not in acute distress.     Appearance: She is well-developed.       Comments: Hard of hearing   HENT:      Head: Normocephalic and atraumatic.      Mouth/Throat:      Mouth: Mucous membranes are moist.      Pharynx: No oropharyngeal exudate.   Eyes:      Extraocular Movements: Extraocular movements intact.      Conjunctiva/sclera: Conjunctivae normal.      Pupils: Pupils are equal, round, and reactive to light.   Cardiovascular:      Rate and Rhythm: Normal rate and regular rhythm.      Heart sounds: Normal heart sounds.   Pulmonary:      Effort: Pulmonary effort is normal. No respiratory distress.      Breath sounds: Normal breath sounds. No wheezing or rales.   Abdominal:      General: Bowel sounds are normal. There is no distension.      Palpations: Abdomen is soft.      Tenderness: There is no abdominal tenderness.      Comments: Candida intertrigo noted to gluteal folds, groin, lower extremities and feet   Musculoskeletal:         General: No swelling or tenderness. Normal range of motion.      Cervical back: Normal range of motion and neck supple.   Lymphadenopathy:      Cervical: No cervical adenopathy.   Skin:     General: Skin is warm and dry.      Capillary Refill: Capillary refill takes less than 2 seconds.      Findings: Erythema and rash (upper and lower extremities, trunk, and back (improved from previous) present.   Neurological:      Mental Status: She is alert and oriented to person, place, and time.      Cranial Nerves: No cranial nerve deficit.      Sensory: No sensory deficit.      Coordination: Coordination normal.   Psychiatric:         Behavior: Behavior normal.         Thought Content: Thought content normal.         Judgment: Judgment normal.       Significant Labs: All pertinent labs within the past 24 hours have been reviewed.  CBC:   Recent Labs   Lab 01/19/23  0557   WBC 5.38   HGB 9.3*   HCT 28.7*        CMP:   Recent Labs   Lab 01/18/23  1329 01/19/23  0557   NA  --  139   K  --  5.3*   CL  --  110   CO2  --  21*   GLU  --  84   BUN  --  69*    CREATININE 2.1* 1.9*   CALCIUM  --  8.2*   ANIONGAP  --  8       Significant Imaging: I have reviewed all pertinent imaging results/findings within the past 24 hours.

## 2023-01-19 NOTE — ASSESSMENT & PLAN NOTE
Stage 3b chronic kidney disease  - overnight 01/05 K 6.6 - given calcium gluconate, albuterol, and lokelma  - scheduled lokelma discontinued  - monitor with labs  - monitor on tele

## 2023-01-19 NOTE — PT/OT/SLP PROGRESS
Physical Therapy   Progress Note    Patient Name:  Jenniffer Jimenez  MRN: [id number]    Admit Date: 1/4/2023  Admitting Diagnosis:  Cellulitis of right lower extremity  Length of Stay: 10 days  Recent Surgery: * No surgery found *      Recommendations:     Discharge Recommendations: Skilled Nursing Facility   Equipment recommendations: shower chair  Barriers to discharge: Increased level of skilled assistance required and Fall risk     Assessment:     Jenniffer Jimenez is a 89 y.o. [place] [facility] [place] with medical diagnosis of Cellulitis of right lower extremity. Pt presents with weakness, impaired endurance, impaired self care skills, impaired balance, gait instability, impaired functional mobility, decreased upper extremity function, decreased coordination, decreased safety awareness, impaired skin. Pt is progressing towards goals, but has not yet reached prior level of function.   Pt agreeable to therapy session after encouragement from therapists and daughter. Daughter is concerned about placement. Told her that therapists will convey concerns to case management. Pt able to ambulate ~10 ft and then requested to return back to bed. Once pt was back in bed, she requested to t/f to [facility] [place]. After assisting with pericare, pt t/f back to bed. Will continue to assess functional mobility.  Jenniffer Jimenez would benefit from continued acute PT intervention to improve quality of life, focus on recovery of impairments, provide patient/caregiver education, reduce fall risk, and maximize (I) and safety with functional mobility. Once medically stable, recommending pt discharge to Skilled Nursing Facility .      Rehab Prognosis: Good    Plan:     During this hospitalization, patient to be seen 3 x/week to address the identified rehab impairments via gait training, therapeutic activities, therapeutic exercises, neuromuscular re-education and progress towards stated goals.     Plan of Care Expires:   "02/08/23  Plan of Care reviewed with: patient and family    This plan of care has been discussed with the patient/caregiver, who was included in its development and is in agreement with the identified goals and treatment plan.     Subjective     Communicated with RN prior to session.  Patient found supine upon PT entry to room, agreeable to therapy session. Pt's daughter present during session.    Patient/Family Comments/goals: "Uh oh I have bad news. I need to pee"    Pain/Comfort:  Pain Rating 1: 0/10  Pain Rating Post-Intervention 1: 0/10    Patients cultural, spiritual, Restorationism conflicts given the current situation: None identified     Objective:   OT present for cotreat due to pt's multiple medical comorbidities and functional/cognition deficits requiring two skilled therapists to appropriately progress pt's musculoskeletal strength, neuromuscular control, and endurance while taking into consideration medical acuity and pt safety.    Patient found with: bed alarm    General Precautions: Standard, fall   Orthopedic Precautions:N/A   Braces: N/A   Oxygen Device: room air    Cognition:  Pt is Alert during session.    Therapist provided skilled verbal and tactile cueing to facilitate the following functional mobility tasks. Listed tasks are focused on recovery of impairments and improving pt's independence and efficiency with bed mobility, transfers and ambulation as able.     Bed Mobility:  Supine > Sit: Minimal Assistance x2 trials  Sit > Supine: Minimal Assistance x2 trials    Transfers:   Sit <> Stand Transfer: Moderate Assistance   from eob with RW AD x2 trials  from BSC with RW AD   Bed <> BSC: Minimal Assistance with RW AD                  Gait:  Distance: 10 ft + 10 ft  Assistance level: Moderate Assistance  Assistive Device: rolling walker  Gait Assessment: decreased marco, decreased gait speed, and narrow base of support; weight shifted posteriorly; kyphotic posture    Balance:  Dynamic Sitting: " FAIR+: Maintains balance through MINIMAL excursions of active trunk motion  Standing:  Static: POOR+: Needs MINIMAL assist to maintain   Dynamic: POOR: Needs MOD (moderate) assist during gait    Outcome Measure: AM-PAC 6 CLICK MOBILITY  Total Score:14     Patient/Caregiver Education and Additional Therapeutic Activities/Exercises       Provided pt/caregiver education regarding:   PT POC and goals for therapy   Safety with mobility and fall risk   Safe management of AD as needed   Energy conservation techniques   Instruction on use of call button and importance of calling nursing staff for assistance with mobility     Patient/caregiver able to verbalize understanding; will follow-up with pt/caregiver during current admit for additional questions/concerns within scope of practice.     White board updated.     Patient left supine with all lines intact, call button in reach, bed alarm on, and daughter present.    Goals:     Multidisciplinary Problems       Physical Therapy Goals          Problem: Physical Therapy    Goal Priority Disciplines Outcome Goal Variances Interventions   Physical Therapy Goal     PT, PT/OT Ongoing, Progressing     Description: Goals to be met by: 23     Patient will increase functional independence with mobility by performin. Sit to stand transfer with Stand-by Assistance  2. Gait  x 100 feet with Stand-by Assistance using LRAD.   3. Stand for 6 minutes with Stand-by Assistance using LRAD while performing dynamic tasks.                         Time Tracking:       PT Received On: 23  PT Start Time: 1336     PT Stop Time: 1359  PT Total Time (min): 23 min     Billable Minutes: Gait Training 15 and Therapeutic Activity 8    2023

## 2023-01-19 NOTE — PROGRESS NOTES
Pharmacist Renal Dose Adjustment Note    Jenniffer Jimenez is a 89 y.o. female being treated with the medication enoxaparin    Patient Data:    Vital Signs (Most Recent):  Temp: 96.7 °F (35.9 °C) (01/19/23 1123)  Pulse: 69 (01/19/23 1123)  Resp: 18 (01/19/23 1123)  BP: (!) 123/56 (01/19/23 1123)  SpO2: 98 % (01/19/23 1123)   Vital Signs (72h Range):  Temp:  [5 °F (-15 °C)-98.4 °F (36.9 °C)]   Pulse:  [49-75]   Resp:  [17-18]   BP: ()/(42-75)   SpO2:  [93 %-100 %]      Recent Labs   Lab 01/15/23  0743 01/18/23  1329 01/19/23  0557   CREATININE 1.1 2.1* 1.9*     Serum creatinine: 1.9 mg/dL (H) 01/19/23 0557  Estimated creatinine clearance: 20.2 mL/min (A)    Medication:Enoxaparin 40 mg q24h will be changed to medication:enoxaparin 30 mg q24h     Pharmacist's Name: Елена Johnson  Pharmacist's Extension: [phone]

## 2023-01-19 NOTE — PT/OT/SLP PROGRESS
Occupational Therapy   Co-Treatment  Co-treatment with PT for maximal pt participation, safety, and activity tolerance     Name: Jenniffer Jimenez  MRN: [id number]  Admitting Diagnosis:  Cellulitis of right lower extremity       Recommendations:     Discharge Recommendations: nursing facility, skilled  Discharge Equipment Recommendations:  shower chair  Barriers to discharge:  Inaccessible home environment, Decreased caregiver support    Assessment:     Jenniffer Jimenez is a 89 y.o. female with a medical diagnosis of Cellulitis of right lower extremity.  She presents with impaired ADL and mobility performance deficits. Pt found upright in bed and agreeable for therapy. Pt's daughter present and supportive. Pt required min A for bed mobility and mod A for standing with RW. Pt with quick fatigue with ambulation requiring max for BSC transfer. Pt would benefit from continued OT skilled services 3x/wk to improve daily living skills to optimize QOL.  Pt is recommended to discharge to SNF at this time.   Performance deficits affecting function are weakness, impaired functional mobility, impaired endurance, gait instability, impaired balance, impaired self care skills, decreased lower extremity function, decreased upper extremity function, impaired skin.     Rehab Prognosis:  Good; patient would benefit from acute skilled OT services to address these deficits and reach maximum level of function.       Plan:     Patient to be seen 4 x/week to address the above listed problems via self-care/home management, therapeutic activities, therapeutic exercises  Plan of Care Expires: 02/09/23  Plan of Care Reviewed with: patient    Subjective     Pain/Comfort:  Pain Rating 1: 0/10  Pain Rating Post-Intervention 1: 0/10  Pain Rating Post-Intervention 2: 0/10    Objective:     Communicated with: RN prior to session.  Patient found HOB elevated with bed alarm upon OT entry to room.    General Precautions: Standard, fall    Orthopedic  Precautions:N/A  Braces: N/A  Respiratory Status: Room air     Occupational Performance:     Bed Mobility:    Patient completed Rolling/Turning to Left with  moderate assistance  Patient completed Scooting/Bridging with moderate assistance  Patient completed Supine to Sit with moderate assistance     Functional Mobility/Transfers:  Patient completed Sit <> Stand Transfer with minimum assistance and of 2 persons  with  rolling walker   Patient completed Toilet Transfer Step Transfer technique with moderate assistance with  rolling walker  Functional Mobility: Pt stood with min A x2 with RW however requiring mod A to take several steps in room (~10 steps). Pt returned to bed. Pt then requesting BSC with mod A to pivot to commode and back to bed     Activities of Daily Living:  Upper Body Dressing: minimum assistance donning gown   Toileting: total assistance for standing pericare      AMPAC 6 Click ADL: 14    Treatment & Education:  Pt educated on role of occupational therapy, POC, and safety during ADLs and functional mobility. Pt and OT discussed importance of safe, continued mobility to optimize daily living skills. Pt verbalized understanding.     White board updated during session. Pt given instruction to call for medical staff/nurse for assistance.       Patient left HOB elevated with all lines intact, call button in reach, and RN notified    GOALS:   Multidisciplinary Problems       Occupational Therapy Goals          Problem: Occupational Therapy    Goal Priority Disciplines Outcome Interventions   Occupational Therapy Goal     OT, PT/OT Ongoing, Progressing    Description: Goals to be met by: 1/16/2023 (1 week)     Patient will increase functional independence with ADLs by performing:    UE Dressing with Supervision.  LE Dressing with Supervision.  Grooming while standing at sink with Stand-by Assistance.  Toileting from toilet with Stand-by Assistance for hygiene and clothing management.   Rolling to  Bilateral with [place].   Supine to sit with [place].  Step transfer with Stand-by Assistance  Toilet transfer to toilet with Stand-by Assistance.                         Time Tracking:     OT Date of Treatment: 01/19/23  OT Start Time: 1330  OT Stop Time: 1359  OT Total Time (min): 29 min    Billable Minutes:Self Care/Home Management 15 min  Therapeutic Activity 14 min    OT/ROMERO: OT          1/19/2023

## 2023-01-20 ENCOUNTER — TELEPHONE (OUTPATIENT)
Dept: WOUND CARE | Facility: CLINIC | Age: 88
End: 2023-01-20
Payer: MEDICARE

## 2023-01-20 LAB
ANION GAP SERPL CALC-SCNC: 10 MMOL/L (ref 8–16)
BASOPHILS # BLD AUTO: 0.02 K/UL (ref 0–0.2)
BASOPHILS NFR BLD: 0.4 % (ref 0–1.9)
BUN SERPL-MCNC: 55 MG/DL (ref 8–23)
CALCIUM SERPL-MCNC: 8.4 MG/DL (ref 8.7–10.5)
CHLORIDE SERPL-SCNC: 109 MMOL/L (ref 95–110)
CO2 SERPL-SCNC: 22 MMOL/L (ref 23–29)
CREAT SERPL-MCNC: 1.5 MG/DL (ref 0.5–1.4)
DIFFERENTIAL METHOD: ABNORMAL
EOSINOPHIL # BLD AUTO: 0.1 K/UL (ref 0–0.5)
EOSINOPHIL NFR BLD: 1.9 % (ref 0–8)
ERYTHROCYTE [DISTWIDTH] IN BLOOD BY AUTOMATED COUNT: 17.4 % (ref 11.5–14.5)
EST. GFR  (NO RACE VARIABLE): 32.9 ML/MIN/1.73 M^2
GLUCOSE SERPL-MCNC: 85 MG/DL (ref 70–110)
HCT VFR BLD AUTO: 27.5 % (ref 37–48.5)
HGB BLD-MCNC: 8.7 G/DL (ref 12–16)
IMM GRANULOCYTES # BLD AUTO: 0.07 K/UL (ref 0–0.04)
IMM GRANULOCYTES NFR BLD AUTO: 1.5 % (ref 0–0.5)
LYMPHOCYTES # BLD AUTO: 1.1 K/UL (ref 1–4.8)
LYMPHOCYTES NFR BLD: 24 % (ref 18–48)
MCH RBC QN AUTO: 28.6 PG (ref 27–31)
MCHC RBC AUTO-ENTMCNC: 31.6 G/DL (ref 32–36)
MCV RBC AUTO: 91 FL (ref 82–98)
MONOCYTES # BLD AUTO: 0.7 K/UL (ref 0.3–1)
MONOCYTES NFR BLD: 15.4 % (ref 4–15)
NEUTROPHILS # BLD AUTO: 2.6 K/UL (ref 1.8–7.7)
NEUTROPHILS NFR BLD: 56.8 % (ref 38–73)
NRBC BLD-RTO: 0 /100 WBC
PLATELET # BLD AUTO: 242 K/UL (ref 150–450)
PMV BLD AUTO: 11.6 FL (ref 9.2–12.9)
POCT GLUCOSE: 102 MG/DL (ref 70–110)
POCT GLUCOSE: 130 MG/DL (ref 70–110)
POCT GLUCOSE: 82 MG/DL (ref 70–110)
POTASSIUM SERPL-SCNC: 5 MMOL/L (ref 3.5–5.1)
RBC # BLD AUTO: 3.04 M/UL (ref 4–5.4)
SODIUM SERPL-SCNC: 141 MMOL/L (ref 136–145)
WBC # BLD AUTO: 4.62 K/UL (ref 3.9–12.7)

## 2023-01-20 PROCEDURE — 63600175 PHARM REV CODE 636 W HCPCS: Mod: HCNC | Performed by: HOSPITALIST

## 2023-01-20 PROCEDURE — 80048 BASIC METABOLIC PNL TOTAL CA: CPT | Mod: HCNC

## 2023-01-20 PROCEDURE — 27000207 HC ISOLATION: Mod: HCNC

## 2023-01-20 PROCEDURE — 85025 COMPLETE CBC W/AUTO DIFF WBC: CPT | Mod: HCNC

## 2023-01-20 PROCEDURE — 25000003 PHARM REV CODE 250: Mod: HCNC

## 2023-01-20 PROCEDURE — 93010 ELECTROCARDIOGRAM REPORT: CPT | Mod: HCNC,,, | Performed by: INTERNAL MEDICINE

## 2023-01-20 PROCEDURE — 25000003 PHARM REV CODE 250: Mod: HCNC | Performed by: HOSPITALIST

## 2023-01-20 PROCEDURE — 25000003 PHARM REV CODE 250: Mod: HCNC | Performed by: PHYSICIAN ASSISTANT

## 2023-01-20 PROCEDURE — 36415 COLL VENOUS BLD VENIPUNCTURE: CPT | Mod: HCNC

## 2023-01-20 PROCEDURE — 93010 EKG 12-LEAD: ICD-10-PCS | Mod: HCNC,,, | Performed by: INTERNAL MEDICINE

## 2023-01-20 PROCEDURE — 93005 ELECTROCARDIOGRAM TRACING: CPT | Mod: HCNC

## 2023-01-20 PROCEDURE — 11000001 HC ACUTE MED/SURG PRIVATE ROOM: Mod: HCNC

## 2023-01-20 PROCEDURE — 99233 PR SUBSEQUENT HOSPITAL CARE,LEVL III: ICD-10-PCS | Mod: HCNC,,,

## 2023-01-20 PROCEDURE — 99233 SBSQ HOSP IP/OBS HIGH 50: CPT | Mod: HCNC,,,

## 2023-01-20 RX ADMIN — TRIAMCINOLONE ACETONIDE: 1 OINTMENT TOPICAL at 09:01

## 2023-01-20 RX ADMIN — DICLOFENAC SODIUM 2 G: 10 GEL TOPICAL at 09:01

## 2023-01-20 RX ADMIN — ASPIRIN 81 MG: 81 TABLET, COATED ORAL at 09:01

## 2023-01-20 RX ADMIN — SILVER SULFADIAZINE: 10 CREAM TOPICAL at 09:01

## 2023-01-20 RX ADMIN — PRAVASTATIN SODIUM 40 MG: 40 TABLET ORAL at 09:01

## 2023-01-20 RX ADMIN — GUAIFENESIN 200 MG: 200 SOLUTION ORAL at 09:01

## 2023-01-20 RX ADMIN — ACETAMINOPHEN 1000 MG: 500 TABLET ORAL at 05:01

## 2023-01-20 RX ADMIN — POLYETHYLENE GLYCOL 3350 17 G: 17 POWDER, FOR SOLUTION ORAL at 09:01

## 2023-01-20 RX ADMIN — SENNOSIDES 8.6 MG: 8.6 TABLET, FILM COATED ORAL at 09:01

## 2023-01-20 RX ADMIN — SODIUM CHLORIDE 500 ML: 9 INJECTION, SOLUTION INTRAVENOUS at 09:01

## 2023-01-20 RX ADMIN — ENOXAPARIN SODIUM 30 MG: 100 INJECTION SUBCUTANEOUS at 05:01

## 2023-01-20 RX ADMIN — MICONAZOLE NITRATE 2 % TOPICAL POWDER: at 09:01

## 2023-01-20 RX ADMIN — OLANZAPINE 2.5 MG: 2.5 TABLET, FILM COATED ORAL at 09:01

## 2023-01-20 RX ADMIN — ACETAMINOPHEN 1000 MG: 500 TABLET ORAL at 11:01

## 2023-01-20 NOTE — PROGRESS NOTES
[facility]  Wound Care    Patient Name:  Jenniffer Jimenez   MRN:  [id number]  Date: 2023  Diagnosis: Cellulitis of right lower extremity    History:     Past Medical History:   Diagnosis Date    Amblyopia of left eye 4/10/2013    Anxiety     Arthritis     Facet arthropathy, Lumbosacral    Cataract     Central retinal vein occlusion of left eye     Depression     Diabetic polyneuropathy 2022    Exotropia of both eyes 2013    recession RSR 5.0mm w/ adj; recession LR os 5.0 w/ adj; resect MR os  4.0mm    Hearing loss     History of resection of small bowel     Hypertensive retinopathy of both eyes     Hypoglycemia     Macular degeneration     OA (osteoarthritis) of shoulder     Right    Osteoporosis     Posterior vitreous detachment of both eyes     Rhinitis     TIA (transient ischemic attack)        Social History     Socioeconomic History    Marital status:    Occupational History    Occupation: retired   Tobacco Use    Smoking status: Former     Types: Cigarettes     Quit date: 10/29/1982     Years since quittin.2     Passive exposure: Never    Smokeless tobacco: Never   Substance and Sexual Activity    Alcohol use: Yes     Alcohol/week: 2.0 standard drinks     Types: 2 Shots of liquor per week     Comment: rare    Drug use: No    Sexual activity: Never   Other Topics Concern    Are you pregnant or think you may be? No    Breast-feeding No     Social Determinants of Health     Financial Resource Strain: Low Risk     Difficulty of Paying Living Expenses: Not hard at all   Food Insecurity: No Food Insecurity    Worried About Running Out of Food in the Last Year: Never true    Ran Out of Food in the Last Year: Never true   Transportation Needs: No Transportation Needs    Lack of Transportation (Medical): No    Lack of Transportation (Non-Medical): No   Physical Activity: Inactive    Days of Exercise per Week: 0 days    Minutes of Exercise per Session: 0 min   Stress: Stress  Concern Present    Feeling of Stress : To some extent   Social Connections: Socially Isolated    Frequency of Communication with Friends and Family: Once a week    Frequency of Social Gatherings with Friends and Family: Once a week    Attends Oriental orthodox Services: Never    Active Member of Clubs or Organizations: Yes    Attends Club or Organization Meetings: Never    Marital Status:    Housing Stability: Low Risk     Unable to Pay for Housing in the Last Year: No    Number of Places Lived in the Last Year: 1    Unstable Housing in the Last Year: No       Precautions:     Allergies as of 01/04/2023 - Reviewed 01/04/2023   Allergen Reaction Noted    Opioids - morphine analogues Other (See Comments) 08/15/2018    Tizanidine Other (See Comments) 04/06/2018    Tramadol Hallucinations 01/18/2013    Beta-blockers (beta-adrenergic blocking agts) Other (See Comments) 10/23/2013    Morphine  12/24/2021    Opioids-meperidine and related  01/04/2022    Ciprofloxacin Rash 12/28/2022       WOC Assessment Details/Treatment        01/20/23 1030   WOCN Assessment   WOCN Total Time (mins) 30   Visit Date 01/20/23   Visit Time 1030   Consult Type Follow Up   WOCN Speciality Wound   Intervention assessed;changed;chart review   Teaching on-going        Altered Skin Integrity 09/25/22 2010 Right lower Leg #1   Date First Assessed/Time First Assessed: 09/25/22 2010   Altered Skin Integrity Present on Admission: yes  Side: Right  Orientation: lower  Location: Leg  Wound Number: #1   Wound Image    Dressing Appearance Moist drainage;Intact   Drainage Amount Small   Drainage Characteristics/Odor Serosanguineous   Appearance Red   Red (%), Wound Tissue Color 100 %   Care Cleansed with:  (Dakins)   Dressing Changed;Absorptive Pad;Rolled gauze  (vaseline gauze)     Wound follow up performed for right lower extremity.  Able to make needs known.  Hard of hearing.  Redness noted with scaly skin noted.  Continue current orders for wound care.   Also noted to have rash to almost entire body.  Has orders from dermatology.   01/20/2023

## 2023-01-20 NOTE — ASSESSMENT & PLAN NOTE
- Continue to monitor  - Holding home lasix and aldactone, given euvolemic on exam, restart when clinically appropriate
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
## 2023-01-20 NOTE — SUBJECTIVE & OBJECTIVE
Interval History:   NAEON, VSSAF on my exam. JOSHUA improved overnight, Cr improving. Patient medically ready for discharge to SNF. Discussion with granddaughter, healthcare POA, regarding accepting facilities. Granddaughter will communicate choice to CM. CBC without leukocytosis. CMP without emergent electrolyte abnormalities. Patient without further complaints.     Review of Systems   Constitutional:  Negative for appetite change, chills and fever.   HENT:  Negative for congestion, trouble swallowing and voice change.    Eyes:  Negative for photophobia and visual disturbance.   Respiratory:  Negative for cough, chest tightness and wheezing.    Cardiovascular:  Negative for chest pain, palpitations and leg swelling.   Gastrointestinal:  Negative for abdominal pain, constipation, diarrhea, nausea and vomiting.   Endocrine: Negative for polyphagia and polyuria.   Genitourinary:  Negative for decreased urine volume, difficulty urinating, dysuria, flank pain and hematuria.   Musculoskeletal:  Positive for arthralgias (R hip). Negative for back pain and gait problem.   Skin:  Positive for color change and wound.   Neurological:  Negative for dizziness, seizures, syncope, weakness, numbness and headaches.   Psychiatric/Behavioral:  Negative for agitation, behavioral problems and confusion.    Objective:     Vital Signs (Most Recent):  Temp: 97.2 °F (36.2 °C) (01/20/23 1119)  Pulse: 81 (01/20/23 1119)  Resp: 18 (01/20/23 1119)  BP: 128/61 (01/20/23 1119)  SpO2: 96 % (01/20/23 1119) Vital Signs (24h Range):  Temp:  [96.6 °F (35.9 °C)-98.5 °F (36.9 °C)] 97.2 °F (36.2 °C)  Pulse:  [67-81] 81  Resp:  [18] 18  SpO2:  [96 %-99 %] 96 %  BP: ()/(44-71) 128/61     Weight: 76.8 kg (169 lb 5 oz)  Body mass index is 28.18 kg/m².    Intake/Output Summary (Last 24 hours) at 1/20/2023 8511  Last data filed at 1/20/2023 0745  Gross per 24 hour   Intake 1 ml   Output --   Net 1 ml      Physical Exam  Vitals and nursing note reviewed.    Constitutional:       General: She is not in acute distress.     Appearance: She is well-developed.      Comments: Hard of hearing   HENT:      Head: Normocephalic and atraumatic.      Mouth/Throat:      Mouth: Mucous membranes are moist.      Pharynx: No oropharyngeal exudate.   Eyes:      Extraocular Movements: Extraocular movements intact.      Conjunctiva/sclera: Conjunctivae normal.      Pupils: Pupils are equal, round, and reactive to light.   Cardiovascular:      Rate and Rhythm: Normal rate and regular rhythm.      Heart sounds: Normal heart sounds.   Pulmonary:      Effort: Pulmonary effort is normal. No respiratory distress.      Breath sounds: Normal breath sounds. No wheezing or rales.   Abdominal:      General: Bowel sounds are normal. There is no distension.      Palpations: Abdomen is soft.      Tenderness: There is no abdominal tenderness.      Comments: Candida intertrigo noted to gluteal folds, groin, lower extremities and feet   Musculoskeletal:         General: No swelling or tenderness. Normal range of motion.      Cervical back: Normal range of motion and neck supple.   Lymphadenopathy:      Cervical: No cervical adenopathy.   Skin:     General: Skin is warm and dry.      Capillary Refill: Capillary refill takes less than 2 seconds.      Findings: Erythema and rash (upper and lower extremities, trunk, and back (improved from previous) present.   Neurological:      Mental Status: She is alert and oriented to person, place, and time.      Cranial Nerves: No cranial nerve deficit.      Sensory: No sensory deficit.      Coordination: Coordination normal.   Psychiatric:         Behavior: Behavior normal.         Thought Content: Thought content normal.         Judgment: Judgment normal.       Significant Labs: All pertinent labs within the past 24 hours have been reviewed.  CBC:   Recent Labs   Lab 01/19/23  0557 01/20/23  0458   WBC 5.38 4.62   HGB 9.3* 8.7*   HCT 28.7* 27.5*    242      CMP:   Recent Labs   Lab 01/18/23  1329 01/19/23  0557 01/20/23  0458   NA  --  139 141   K  --  5.3* 5.0   CL  --  110 109   CO2  --  21* 22*   GLU  --  84 85   BUN  --  69* 55*   CREATININE 2.1* 1.9* 1.5*   CALCIUM  --  8.2* 8.4*   ANIONGAP  --  8 10       Significant Imaging: I have reviewed all pertinent imaging results/findings within the past 24 hours.

## 2023-01-20 NOTE — TELEPHONE ENCOUNTER
----- Message from Divya Chance sent at 1/20/2023  1:49 PM CST -----  Regarding: Appt  Contact: Afshan @ [phone]  Case Management is calling to get wound care for pt. Asking if office can call pts daughter, Afshan Duenas.

## 2023-01-20 NOTE — ASSESSMENT & PLAN NOTE
- Creatinine 1.5 on admission, Cr today Estimated Creatinine Clearance: 25.5 mL/min (A) (based on SCr of 1.5 mg/dL (H)). (baseline 1.2)  - hold home lasix and aldactone  - cIVFs overnight, 500cc NS throughout 01/19  - 1/19 - Cr 2.1 --> 1.9 --> 1.5 and improving  - Continue to monitor on daily labs

## 2023-01-21 LAB
ANION GAP SERPL CALC-SCNC: 9 MMOL/L (ref 8–16)
BASOPHILS # BLD AUTO: 0.02 K/UL (ref 0–0.2)
BASOPHILS NFR BLD: 0.4 % (ref 0–1.9)
BUN SERPL-MCNC: 52 MG/DL (ref 8–23)
CALCIUM SERPL-MCNC: 8.4 MG/DL (ref 8.7–10.5)
CHLORIDE SERPL-SCNC: 110 MMOL/L (ref 95–110)
CO2 SERPL-SCNC: 20 MMOL/L (ref 23–29)
CREAT SERPL-MCNC: 1.2 MG/DL (ref 0.5–1.4)
DIFFERENTIAL METHOD: ABNORMAL
EOSINOPHIL # BLD AUTO: 0.1 K/UL (ref 0–0.5)
EOSINOPHIL NFR BLD: 2 % (ref 0–8)
ERYTHROCYTE [DISTWIDTH] IN BLOOD BY AUTOMATED COUNT: 17.4 % (ref 11.5–14.5)
EST. GFR  (NO RACE VARIABLE): 43 ML/MIN/1.73 M^2
GLUCOSE SERPL-MCNC: 87 MG/DL (ref 70–110)
HCT VFR BLD AUTO: 28.7 % (ref 37–48.5)
HGB BLD-MCNC: 9 G/DL (ref 12–16)
IMM GRANULOCYTES # BLD AUTO: 0.03 K/UL (ref 0–0.04)
IMM GRANULOCYTES NFR BLD AUTO: 0.7 % (ref 0–0.5)
LYMPHOCYTES # BLD AUTO: 0.9 K/UL (ref 1–4.8)
LYMPHOCYTES NFR BLD: 20.7 % (ref 18–48)
MCH RBC QN AUTO: 28.2 PG (ref 27–31)
MCHC RBC AUTO-ENTMCNC: 31.4 G/DL (ref 32–36)
MCV RBC AUTO: 90 FL (ref 82–98)
MONOCYTES # BLD AUTO: 0.7 K/UL (ref 0.3–1)
MONOCYTES NFR BLD: 15.3 % (ref 4–15)
NEUTROPHILS # BLD AUTO: 2.7 K/UL (ref 1.8–7.7)
NEUTROPHILS NFR BLD: 60.9 % (ref 38–73)
NRBC BLD-RTO: 0 /100 WBC
PLATELET # BLD AUTO: 225 K/UL (ref 150–450)
PMV BLD AUTO: 11.3 FL (ref 9.2–12.9)
POTASSIUM SERPL-SCNC: 5.1 MMOL/L (ref 3.5–5.1)
RBC # BLD AUTO: 3.19 M/UL (ref 4–5.4)
SODIUM SERPL-SCNC: 139 MMOL/L (ref 136–145)
WBC # BLD AUTO: 4.5 K/UL (ref 3.9–12.7)

## 2023-01-21 PROCEDURE — 27000207 HC ISOLATION: Mod: HCNC

## 2023-01-21 PROCEDURE — 99233 SBSQ HOSP IP/OBS HIGH 50: CPT | Mod: HCNC,,,

## 2023-01-21 PROCEDURE — 93010 ELECTROCARDIOGRAM REPORT: CPT | Mod: HCNC,,, | Performed by: INTERNAL MEDICINE

## 2023-01-21 PROCEDURE — 93010 EKG 12-LEAD: ICD-10-PCS | Mod: HCNC,,, | Performed by: INTERNAL MEDICINE

## 2023-01-21 PROCEDURE — 11000001 HC ACUTE MED/SURG PRIVATE ROOM: Mod: HCNC

## 2023-01-21 PROCEDURE — 25000003 PHARM REV CODE 250: Mod: HCNC | Performed by: PHYSICIAN ASSISTANT

## 2023-01-21 PROCEDURE — 99233 PR SUBSEQUENT HOSPITAL CARE,LEVL III: ICD-10-PCS | Mod: HCNC,,,

## 2023-01-21 PROCEDURE — 36415 COLL VENOUS BLD VENIPUNCTURE: CPT | Mod: HCNC

## 2023-01-21 PROCEDURE — 93005 ELECTROCARDIOGRAM TRACING: CPT | Mod: HCNC

## 2023-01-21 PROCEDURE — 80048 BASIC METABOLIC PNL TOTAL CA: CPT | Mod: HCNC

## 2023-01-21 PROCEDURE — 25000003 PHARM REV CODE 250: Mod: HCNC

## 2023-01-21 PROCEDURE — 63600175 PHARM REV CODE 636 W HCPCS: Mod: HCNC | Performed by: HOSPITALIST

## 2023-01-21 PROCEDURE — 85025 COMPLETE CBC W/AUTO DIFF WBC: CPT | Mod: HCNC

## 2023-01-21 RX ORDER — FUROSEMIDE 20 MG/1
20 TABLET ORAL DAILY
Status: DISCONTINUED | OUTPATIENT
Start: 2023-01-21 | End: 2023-01-22

## 2023-01-21 RX ADMIN — ENOXAPARIN SODIUM 30 MG: 100 INJECTION SUBCUTANEOUS at 05:01

## 2023-01-21 RX ADMIN — ASPIRIN 81 MG: 81 TABLET, COATED ORAL at 08:01

## 2023-01-21 RX ADMIN — ACETAMINOPHEN 1000 MG: 500 TABLET ORAL at 05:01

## 2023-01-21 RX ADMIN — DICLOFENAC SODIUM 2 G: 10 GEL TOPICAL at 09:01

## 2023-01-21 RX ADMIN — TRIAMCINOLONE ACETONIDE: 1 OINTMENT TOPICAL at 09:01

## 2023-01-21 RX ADMIN — FUROSEMIDE 20 MG: 20 TABLET ORAL at 11:01

## 2023-01-21 RX ADMIN — SILVER SULFADIAZINE: 10 CREAM TOPICAL at 08:01

## 2023-01-21 RX ADMIN — PRAVASTATIN SODIUM 40 MG: 40 TABLET ORAL at 08:01

## 2023-01-21 RX ADMIN — OLANZAPINE 2.5 MG: 2.5 TABLET, FILM COATED ORAL at 08:01

## 2023-01-21 RX ADMIN — TRIAMCINOLONE ACETONIDE: 1 OINTMENT TOPICAL at 08:01

## 2023-01-21 RX ADMIN — DICLOFENAC SODIUM 2 G: 10 GEL TOPICAL at 08:01

## 2023-01-21 RX ADMIN — SILVER SULFADIAZINE: 10 CREAM TOPICAL at 09:01

## 2023-01-21 RX ADMIN — ACETAMINOPHEN 1000 MG: 500 TABLET ORAL at 11:01

## 2023-01-21 NOTE — ASSESSMENT & PLAN NOTE
- Creatinine 1.5 on admission, Cr today Estimated Creatinine Clearance: 31.9 mL/min (based on SCr of 1.2 mg/dL). (baseline 1.2)  - hold home lasix and aldactone  - cIVFs overnight, 500cc NS throughout 01/19  - 1/19 - Cr 2.1 --> 1.9 --> 1.5 --> 1.2 and back to baseline  - Continue to monitor on daily labs
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
## 2023-01-21 NOTE — ASSESSMENT & PLAN NOTE
- Pt complaining of occasional SOB with volume overload  -   - CXR with coarse interstitial lung markings and cardiomegaly   - last echo below  - JOSHUA resolved - start lasix daily (holding home lasix bid & aldactone given without significant hypervolemia on exam)  - strict I/Os, daily weights    Results for orders placed during the hospital encounter of 10/07/22  Echo  Interpretation Summary  · The left ventricle is normal in size with concentric remodeling and normal systolic function.  · The estimated ejection fraction is 55-60%.  · Grade III left ventricular diastolic dysfunction.  · Mild mitral regurgitation.  · Normal right ventricular size with mildly reduced right ventricular systolic function.  · Severe tricuspid regurgitation.  · The estimated PA systolic pressure is 52 mmHg. PASP may be under-estimated due to TR severity.  · Elevated central venous pressure (15 mmHg).  · There is pulmonary hypertension.  · Severe left atrial enlargement.

## 2023-01-21 NOTE — PLAN OF CARE
Patient is alert and oriented. Ambulating to the commode. Received lasix x1. Dressing changed to right lower extremity wound. Creams applied to body rash. Diclofenac applied to bilateral hips for pain control. Scheduled tylenol. Plan for SNF.  Call light within reach for safety.     Problem: Adult Inpatient Plan of Care  Goal: Plan of Care Review  Outcome: Ongoing, Progressing     Problem: Infection  Goal: Absence of Infection Signs and Symptoms  Outcome: Ongoing, Progressing     Problem: Impaired Wound Healing  Goal: Optimal Wound Healing  Outcome: Ongoing, Progressing

## 2023-01-21 NOTE — PLAN OF CARE
Problem: Adult Inpatient Plan of Care  Goal: Plan of Care Review  Outcome: Ongoing, Progressing  Goal: Patient-Specific Goal (Individualized)  Outcome: Ongoing, Progressing  Goal: Optimal Comfort and Wellbeing  Outcome: Ongoing, Progressing  Goal: Readiness for Transition of Care  Outcome: Ongoing, Progressing     Problem: Infection  Goal: Absence of Infection Signs and Symptoms  Outcome: Ongoing, Progressing     Problem: Fluid and Electrolyte Imbalance (Acute Kidney Injury/Impairment)  Goal: Fluid and Electrolyte Balance  Outcome: Ongoing, Progressing     Problem: Oral Intake Inadequate (Acute Kidney Injury/Impairment)  Goal: Optimal Nutrition Intake  Outcome: Ongoing, Progressing     Problem: Impaired Wound Healing  Goal: Optimal Wound Healing  Outcome: Ongoing, Progressing     Problem: Fall Injury Risk  Goal: Absence of Fall and Fall-Related Injury  Outcome: Ongoing, Progressing     Problem: Skin Injury Risk Increased  Goal: Skin Health and Integrity  Outcome: Ongoing, Progressing

## 2023-01-21 NOTE — ASSESSMENT & PLAN NOTE
- Continue to monitor  - Holding full home lasix bid dosing and aldactone, given patient without significant hypervolemia on exam  - start lasix oral 20mg

## 2023-01-21 NOTE — SUBJECTIVE & OBJECTIVE
Interval History:   NAEON, VSSAF on my exam. Patient without complaints. JOSHUA resolved. Will resume lasix daily, holding on full home lasix BID and aldactone dosing given lack of significant hypervolemia. [facility] accepted, awaiting authorization. CBC without leukocytosis. CMP without emergent electrolyte abnormalities. Patient without further complaints.     Review of Systems   Constitutional:  Negative for appetite change, chills and fever.   HENT:  Negative for congestion, trouble swallowing and voice change.    Eyes:  Negative for photophobia and visual disturbance.   Respiratory:  Negative for cough, chest tightness and wheezing.    Cardiovascular:  Negative for chest pain, palpitations and leg swelling.   Gastrointestinal:  Negative for abdominal pain, constipation, diarrhea, nausea and vomiting.   Endocrine: Negative for polyphagia and polyuria.   Genitourinary:  Negative for decreased urine volume, difficulty urinating, dysuria, flank pain and hematuria.   Musculoskeletal:  Positive for arthralgias (R hip). Negative for back pain and gait problem.   Skin:  Positive for color change and wound.   Neurological:  Negative for dizziness, seizures, syncope, weakness, numbness and headaches.   Psychiatric/Behavioral:  Negative for agitation, behavioral problems and confusion.    Objective:     Vital Signs (Most Recent):  Temp: 97.8 °F (36.6 °C) (01/21/23 1115)  Pulse: 69 (01/21/23 1115)  Resp: 17 (01/21/23 1115)  BP: (!) 121/58 (01/21/23 1115)  SpO2: 99 % (01/21/23 1115)   Vital Signs (24h Range):  Temp:  [96.2 °F (35.7 °C)-98 °F (36.7 °C)] 97.8 °F (36.6 °C)  Pulse:  [53-72] 69  Resp:  [16-18] 17  SpO2:  [95 %-99 %] 99 %  BP: (101-128)/(47-58) 121/58     Weight: 76.8 kg (169 lb 5 oz)  Body mass index is 28.18 kg/m².    Intake/Output Summary (Last 24 hours) at 1/21/2023 1144  Last data filed at 1/20/2023 1341  Gross per 24 hour   Intake 222 ml   Output --   Net 222 ml      Physical Exam  Vitals and nursing note  reviewed.   Constitutional:       General: She is not in acute distress.     Appearance: She is well-developed.      Comments: Hard of hearing   HENT:      Head: Normocephalic and atraumatic.      Mouth/Throat:      Mouth: Mucous membranes are moist.      Pharynx: No oropharyngeal exudate.   Eyes:      Extraocular Movements: Extraocular movements intact.      Conjunctiva/sclera: Conjunctivae normal.      Pupils: Pupils are equal, round, and reactive to light.   Cardiovascular:      Rate and Rhythm: Normal rate and regular rhythm.      Heart sounds: Normal heart sounds.   Pulmonary:      Effort: Pulmonary effort is normal. No respiratory distress.      Breath sounds: Normal breath sounds. No wheezing or rales.   Abdominal:      General: Bowel sounds are normal. There is no distension.      Palpations: Abdomen is soft.      Tenderness: There is no abdominal tenderness.      Comments: Candida intertrigo noted to gluteal folds, groin, lower extremities and feet   Musculoskeletal:         General: No swelling or tenderness. Normal range of motion.      Cervical back: Normal range of motion and neck supple.   Lymphadenopathy:      Cervical: No cervical adenopathy.   Skin:     General: Skin is warm and dry.      Capillary Refill: Capillary refill takes less than 2 seconds.      Findings: Erythema and rash (upper and lower extremities, trunk, and back (improved from previous) present.   Neurological:      Mental Status: She is alert and oriented to person, place, and time.      Cranial Nerves: No cranial nerve deficit.      Sensory: No sensory deficit.      Coordination: Coordination normal.   Psychiatric:         Behavior: Behavior normal.         Thought Content: Thought content normal.         Judgment: Judgment normal.       Significant Labs: All pertinent labs within the past 24 hours have been reviewed.  CBC:   Recent Labs   Lab 01/20/23  0458 01/21/23  0844   WBC 4.62 4.50   HGB 8.7* 9.0*   HCT 27.5* 28.7*     225     CMP:   Recent Labs   Lab 01/20/23  0458 01/21/23  0844    139   K 5.0 5.1    110   CO2 22* 20*   GLU 85 87   BUN 55* 52*   CREATININE 1.5* 1.2   CALCIUM 8.4* 8.4*   ANIONGAP 10 9       Significant Imaging: I have reviewed all pertinent imaging results/findings within the past 24 hours.

## 2023-01-22 LAB
ANION GAP SERPL CALC-SCNC: 7 MMOL/L (ref 8–16)
BASOPHILS # BLD AUTO: 0.02 K/UL (ref 0–0.2)
BASOPHILS NFR BLD: 0.4 % (ref 0–1.9)
BUN SERPL-MCNC: 45 MG/DL (ref 8–23)
CALCIUM SERPL-MCNC: 8.6 MG/DL (ref 8.7–10.5)
CHLORIDE SERPL-SCNC: 112 MMOL/L (ref 95–110)
CO2 SERPL-SCNC: 22 MMOL/L (ref 23–29)
CREAT SERPL-MCNC: 1.6 MG/DL (ref 0.5–1.4)
DIFFERENTIAL METHOD: ABNORMAL
EOSINOPHIL # BLD AUTO: 0.1 K/UL (ref 0–0.5)
EOSINOPHIL NFR BLD: 2.1 % (ref 0–8)
ERYTHROCYTE [DISTWIDTH] IN BLOOD BY AUTOMATED COUNT: 17.6 % (ref 11.5–14.5)
EST. GFR  (NO RACE VARIABLE): 30.4 ML/MIN/1.73 M^2
GLUCOSE SERPL-MCNC: 73 MG/DL (ref 70–110)
HCT VFR BLD AUTO: 28.7 % (ref 37–48.5)
HGB BLD-MCNC: 9 G/DL (ref 12–16)
IMM GRANULOCYTES # BLD AUTO: 0.04 K/UL (ref 0–0.04)
IMM GRANULOCYTES NFR BLD AUTO: 0.8 % (ref 0–0.5)
LYMPHOCYTES # BLD AUTO: 0.8 K/UL (ref 1–4.8)
LYMPHOCYTES NFR BLD: 15.7 % (ref 18–48)
MCH RBC QN AUTO: 28.4 PG (ref 27–31)
MCHC RBC AUTO-ENTMCNC: 31.4 G/DL (ref 32–36)
MCV RBC AUTO: 91 FL (ref 82–98)
MONOCYTES # BLD AUTO: 0.8 K/UL (ref 0.3–1)
MONOCYTES NFR BLD: 15.1 % (ref 4–15)
NEUTROPHILS # BLD AUTO: 3.4 K/UL (ref 1.8–7.7)
NEUTROPHILS NFR BLD: 65.9 % (ref 38–73)
NRBC BLD-RTO: 0 /100 WBC
PLATELET # BLD AUTO: 208 K/UL (ref 150–450)
PMV BLD AUTO: 11.1 FL (ref 9.2–12.9)
POTASSIUM SERPL-SCNC: 5.7 MMOL/L (ref 3.5–5.1)
RBC # BLD AUTO: 3.17 M/UL (ref 4–5.4)
SODIUM SERPL-SCNC: 141 MMOL/L (ref 136–145)
WBC # BLD AUTO: 5.16 K/UL (ref 3.9–12.7)

## 2023-01-22 PROCEDURE — 93005 ELECTROCARDIOGRAM TRACING: CPT | Mod: HCNC

## 2023-01-22 PROCEDURE — 25000003 PHARM REV CODE 250: Mod: HCNC

## 2023-01-22 PROCEDURE — 36415 COLL VENOUS BLD VENIPUNCTURE: CPT | Mod: HCNC | Performed by: HOSPITALIST

## 2023-01-22 PROCEDURE — 80048 BASIC METABOLIC PNL TOTAL CA: CPT | Mod: HCNC | Performed by: HOSPITALIST

## 2023-01-22 PROCEDURE — 99233 PR SUBSEQUENT HOSPITAL CARE,LEVL III: ICD-10-PCS | Mod: HCNC,,,

## 2023-01-22 PROCEDURE — 93010 ELECTROCARDIOGRAM REPORT: CPT | Mod: HCNC,,, | Performed by: INTERNAL MEDICINE

## 2023-01-22 PROCEDURE — 85025 COMPLETE CBC W/AUTO DIFF WBC: CPT | Mod: HCNC | Performed by: HOSPITALIST

## 2023-01-22 PROCEDURE — 93010 EKG 12-LEAD: ICD-10-PCS | Mod: HCNC,,, | Performed by: INTERNAL MEDICINE

## 2023-01-22 PROCEDURE — 63600175 PHARM REV CODE 636 W HCPCS: Mod: HCNC | Performed by: HOSPITALIST

## 2023-01-22 PROCEDURE — 11000001 HC ACUTE MED/SURG PRIVATE ROOM: Mod: HCNC

## 2023-01-22 PROCEDURE — 27000207 HC ISOLATION: Mod: HCNC

## 2023-01-22 PROCEDURE — 99233 SBSQ HOSP IP/OBS HIGH 50: CPT | Mod: HCNC,,,

## 2023-01-22 PROCEDURE — 25000003 PHARM REV CODE 250: Mod: HCNC | Performed by: PHYSICIAN ASSISTANT

## 2023-01-22 RX ADMIN — ENOXAPARIN SODIUM 30 MG: 100 INJECTION SUBCUTANEOUS at 05:01

## 2023-01-22 RX ADMIN — FUROSEMIDE 20 MG: 20 TABLET ORAL at 08:01

## 2023-01-22 RX ADMIN — SILVER SULFADIAZINE: 10 CREAM TOPICAL at 08:01

## 2023-01-22 RX ADMIN — TRIAMCINOLONE ACETONIDE: 1 OINTMENT TOPICAL at 10:01

## 2023-01-22 RX ADMIN — ACETAMINOPHEN 1000 MG: 500 TABLET ORAL at 12:01

## 2023-01-22 RX ADMIN — DICLOFENAC SODIUM 2 G: 10 GEL TOPICAL at 10:01

## 2023-01-22 RX ADMIN — DICLOFENAC SODIUM 2 G: 10 GEL TOPICAL at 08:01

## 2023-01-22 RX ADMIN — ASPIRIN 81 MG: 81 TABLET, COATED ORAL at 08:01

## 2023-01-22 RX ADMIN — TRIAMCINOLONE ACETONIDE: 1 OINTMENT TOPICAL at 08:01

## 2023-01-22 RX ADMIN — SODIUM ZIRCONIUM CYCLOSILICATE 10 G: 10 POWDER, FOR SUSPENSION ORAL at 10:01

## 2023-01-22 RX ADMIN — ACETAMINOPHEN 1000 MG: 500 TABLET ORAL at 05:01

## 2023-01-22 RX ADMIN — PRAVASTATIN SODIUM 40 MG: 40 TABLET ORAL at 08:01

## 2023-01-22 RX ADMIN — SODIUM ZIRCONIUM CYCLOSILICATE 10 G: 10 POWDER, FOR SUSPENSION ORAL at 03:01

## 2023-01-22 RX ADMIN — OLANZAPINE 2.5 MG: 2.5 TABLET, FILM COATED ORAL at 10:01

## 2023-01-22 RX ADMIN — SILVER SULFADIAZINE: 10 CREAM TOPICAL at 10:01
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
## 2023-01-22 NOTE — PLAN OF CARE
Problem: Adult Inpatient Plan of Care  Goal: Patient-Specific Goal (Individualized)  Outcome: Ongoing, Progressing  Goal: Absence of Hospital-Acquired Illness or Injury  Outcome: Ongoing, Progressing  Goal: Optimal Comfort and Wellbeing  Outcome: Ongoing, Progressing     Problem: Infection  Goal: Absence of Infection Signs and Symptoms  Outcome: Ongoing, Progressing     Problem: Fluid and Electrolyte Imbalance (Acute Kidney Injury/Impairment)  Goal: Fluid and Electrolyte Balance  Outcome: Ongoing, Progressing     Problem: Oral Intake Inadequate (Acute Kidney Injury/Impairment)  Goal: Optimal Nutrition Intake  Outcome: Ongoing, Progressing     Problem: Impaired Wound Healing  Goal: Optimal Wound Healing  Outcome: Ongoing, Progressing     Problem: Fall Injury Risk  Goal: Absence of Fall and Fall-Related Injury  Outcome: Ongoing, Progressing     Problem: Skin Injury Risk Increased  Goal: Skin Health and Integrity  Outcome: Ongoing, Progressing

## 2023-01-22 NOTE — SUBJECTIVE & OBJECTIVE
Interval History:   NAEON, VSSAF on my exam. Patient without complaints. Continuing wound checks, miconazole powder, triamcinolone cream, and lasix. Medically ready for discharge to SNF, authorization pending. CBC & CMP results pending. Will monitor Cr and consider resuming full home dose of lasix bid and aldactone if clinically appropriate. Patient without further complaints.     Review of Systems   Constitutional:  Negative for appetite change, chills and fever.   HENT:  Negative for congestion, trouble swallowing and voice change.    Eyes:  Negative for photophobia and visual disturbance.   Respiratory:  Negative for cough, chest tightness and wheezing.    Cardiovascular:  Negative for chest pain, palpitations and leg swelling.   Gastrointestinal:  Negative for abdominal pain, constipation, diarrhea, nausea and vomiting.   Endocrine: Negative for polyphagia and polyuria.   Genitourinary:  Negative for decreased urine volume, difficulty urinating, dysuria, flank pain and hematuria.   Musculoskeletal:  Positive for arthralgias (R hip). Negative for back pain and gait problem.   Skin:  Positive for color change and wound.   Neurological:  Negative for dizziness, seizures, syncope, weakness, numbness and headaches.   Psychiatric/Behavioral:  Negative for agitation, behavioral problems and confusion.    Objective:     Vital Signs (Most Recent):  Temp: 97.8 °F (36.6 °C) (01/22/23 0722)  Pulse: (!) 59 (01/22/23 0722)  Resp: 17 (01/22/23 0722)  BP: (!) 121/56 (01/22/23 0722)  SpO2: 97 % (01/22/23 0722)   Vital Signs (24h Range):  Temp:  [97.2 °F (36.2 °C)-98.2 °F (36.8 °C)] 97.8 °F (36.6 °C)  Pulse:  [59-77] 59  Resp:  [17-18] 17  SpO2:  [97 %-100 %] 97 %  BP: (104-135)/(54-78) 121/56     Weight: 76.8 kg (169 lb 5 oz)  Body mass index is 28.18 kg/m².  No intake or output data in the 24 hours ending 01/22/23 0932   Physical Exam  Vitals and nursing note reviewed.   Constitutional:       General: She is not in acute  distress.     Appearance: She is well-developed.      Comments: Hard of hearing   HENT:      Head: Normocephalic and atraumatic.      Mouth/Throat:      Mouth: Mucous membranes are moist.      Pharynx: No oropharyngeal exudate.   Eyes:      Extraocular Movements: Extraocular movements intact.      Conjunctiva/sclera: Conjunctivae normal.      Pupils: Pupils are equal, round, and reactive to light.   Cardiovascular:      Rate and Rhythm: Normal rate and regular rhythm.      Heart sounds: Normal heart sounds.   Pulmonary:      Effort: Pulmonary effort is normal. No respiratory distress.      Breath sounds: Normal breath sounds. No wheezing or rales.   Abdominal:      General: Bowel sounds are normal. There is no distension.      Palpations: Abdomen is soft.      Tenderness: There is no abdominal tenderness.      Comments: Candida intertrigo noted to gluteal folds, groin, lower extremities and feet   Musculoskeletal:         General: No swelling or tenderness. Normal range of motion.      Cervical back: Normal range of motion and neck supple.   Lymphadenopathy:      Cervical: No cervical adenopathy.   Skin:     General: Skin is warm and dry.      Capillary Refill: Capillary refill takes less than 2 seconds.      Findings: Erythema and rash (upper and lower extremities, trunk, and back (improved from previous) present.   Neurological:      Mental Status: She is alert and oriented to person, place, and time.      Cranial Nerves: No cranial nerve deficit.      Sensory: No sensory deficit.      Coordination: Coordination normal.   Psychiatric:         Behavior: Behavior normal.         Thought Content: Thought content normal.         Judgment: Judgment normal.       Significant Labs: All pertinent labs within the past 24 hours have been reviewed.  CBC:   Recent Labs   Lab 01/21/23  0844   WBC 4.50   HGB 9.0*   HCT 28.7*        CMP:   Recent Labs   Lab 01/21/23  0844      K 5.1      CO2 20*   GLU 87   BUN  52*   CREATININE 1.2   CALCIUM 8.4*   ANIONGAP 9       Significant Imaging: I have reviewed all pertinent imaging results/findings within the past 24 hours.

## 2023-01-22 NOTE — ASSESSMENT & PLAN NOTE
RESOLVED  - Creatinine 1.5 on admission, Cr today Estimated Creatinine Clearance: 31.9 mL/min (based on SCr of 1.2 mg/dL). (baseline 1.2)  - hold home lasix and aldactone  - cIVFs overnight, 500cc NS throughout 01/19  - Cr 2.1 --> 1.9 --> 1.5 --> 1.2 and back to baseline  - 1/22 - CMP pending, will consider restarting full home lasix dose and aldactone given results and if clinically appropriate  - Continue to monitor on daily labs

## 2023-01-22 NOTE — PHARMACY MED REC
"          Admission Medication History     The home medication history was taken by Kena Moore.    You may go to "Admission" then "Reconcile Home Medications" tabs to review and/or act upon these items.     The home medication list has been updated by the Pharmacy department.   Please read ALL comments highlighted in yellow.   Please address this information as you see fit.    Feel free to contact us if you have any questions or require assistance.      The medications listed below were removed from the home medication list. Please reorder if appropriate:  Patient reports no longer taking the following medication(s):  CLOTRIMAZOLE 1 % CREAM  GENTAMICIN 0.1 % CREAM  MUPIROCIN 2 % OINTMENT      Medications listed below were obtained from: Patient/family  PTA Medications   Medication Sig    acetaminophen (TYLENOL) 500 MG tablet   Take 1,000 mg by mouth 2 (two) times a day.    aspirin (ECOTRIN) 81 MG EC tablet   Take 1 tablet (81 mg total) by mouth once daily.    famotidine (PEPCID) 20 MG tablet   Take 1 tablet (20 mg total) by mouth 2 (two) times daily. For Allergic Reaction.    furosemide (LASIX) 20 MG tablet       Take 1 tablet (20 mg total) by mouth 2 (two) times daily before meals. Take twice a day for Weight greater than 160 lb, once a day for weights less than 160 lb    pravastatin (PRAVACHOL) 40 MG tablet   Take 1 tablet (40 mg total) by mouth once daily.    spironolactone (ALDACTONE) 25 MG tablet   Take 25 mg by mouth 2 (two) times daily.      vit C/E/Zn/coppr/lutein/zeaxan (OCUVITE LUTEIN AND ZEAXANTHIN ORAL)   Take 1 capsule by mouth once daily. Lutien 25 mg, Zeaxanthin 5 mg    vitamin E 400 UNIT capsule   Take 400 Units by mouth once daily.    azelastine (ASTELIN) 137 mcg (0.1 %) nasal spray     1 spray (137 mcg total) by Nasal route 2 (two) times daily. (Patient not taking: Reported on 1/21/2023)    diclofenac sodium (VOLTAREN) 1 % Gel   Apply 2 g topically 4 (four) times daily. Use gloves to apply for " 10 days    fluticasone propionate (FLONASE) 50 mcg/actuation nasal spray   USE 1 SPRAY IN EACH NOSTRIL ONE TIME DAILY (Patient not taking: Reported on 1/21/2023)    guaiFENesin 100 mg/5 ml (ROBITUSSIN) 100 mg/5 mL syrup   Take 15 mLs by mouth once daily.    levocetirizine (XYZAL) 5 MG tablet     Take 1 tablet (5 mg total) by mouth once daily. For Allergies. (Patient not taking: Reported on 1/21/2023)    LIDOcaine (LIDODERM) 5 % Place 1 patch onto the skin once daily. Remove & Discard patch within 12 hours or as directed by provider (Patient not taking: Reported on 1/21/2023)       Potential issues to be addressed PRIOR TO DISCHARGE  Please discuss with the patient barriers to adherence with medication treatment plans  Patient requires education regarding drug therapies     Kena Moore  EXT [phone]        .

## 2023-01-22 NOTE — ASSESSMENT & PLAN NOTE
- Continue to monitor  - Holding full home lasix bid dosing and aldactone, given patient without significant hypervolemia on exam  - continue lasix oral 20mg

## 2023-01-23 LAB
ALBUMIN SERPL BCP-MCNC: 2.6 G/DL (ref 3.5–5.2)
ALP SERPL-CCNC: 116 U/L (ref 55–135)
ALT SERPL W/O P-5'-P-CCNC: 12 U/L (ref 10–44)
ANION GAP SERPL CALC-SCNC: 10 MMOL/L (ref 8–16)
ANION GAP SERPL CALC-SCNC: 10 MMOL/L (ref 8–16)
AST SERPL-CCNC: 12 U/L (ref 10–40)
BASOPHILS # BLD AUTO: 0.02 K/UL (ref 0–0.2)
BASOPHILS NFR BLD: 0.4 % (ref 0–1.9)
BILIRUB SERPL-MCNC: 0.2 MG/DL (ref 0.1–1)
BUN SERPL-MCNC: 42 MG/DL (ref 8–23)
BUN SERPL-MCNC: 47 MG/DL (ref 8–23)
CALCIUM SERPL-MCNC: 8.3 MG/DL (ref 8.7–10.5)
CALCIUM SERPL-MCNC: 8.6 MG/DL (ref 8.7–10.5)
CHLORIDE SERPL-SCNC: 109 MMOL/L (ref 95–110)
CHLORIDE SERPL-SCNC: 112 MMOL/L (ref 95–110)
CO2 SERPL-SCNC: 19 MMOL/L (ref 23–29)
CO2 SERPL-SCNC: 20 MMOL/L (ref 23–29)
CREAT SERPL-MCNC: 1.5 MG/DL (ref 0.5–1.4)
CREAT SERPL-MCNC: 1.6 MG/DL (ref 0.5–1.4)
DIFFERENTIAL METHOD: ABNORMAL
EOSINOPHIL # BLD AUTO: 0.1 K/UL (ref 0–0.5)
EOSINOPHIL NFR BLD: 2.1 % (ref 0–8)
ERYTHROCYTE [DISTWIDTH] IN BLOOD BY AUTOMATED COUNT: 17.7 % (ref 11.5–14.5)
EST. GFR  (NO RACE VARIABLE): 30.4 ML/MIN/1.73 M^2
EST. GFR  (NO RACE VARIABLE): 32.9 ML/MIN/1.73 M^2
GLUCOSE SERPL-MCNC: 105 MG/DL (ref 70–110)
GLUCOSE SERPL-MCNC: 132 MG/DL (ref 70–110)
HCT VFR BLD AUTO: 26.2 % (ref 37–48.5)
HGB BLD-MCNC: 8 G/DL (ref 12–16)
IMM GRANULOCYTES # BLD AUTO: 0.05 K/UL (ref 0–0.04)
IMM GRANULOCYTES NFR BLD AUTO: 1 % (ref 0–0.5)
LYMPHOCYTES # BLD AUTO: 1 K/UL (ref 1–4.8)
LYMPHOCYTES NFR BLD: 19.6 % (ref 18–48)
MAGNESIUM SERPL-MCNC: 2.2 MG/DL (ref 1.6–2.6)
MCH RBC QN AUTO: 28.3 PG (ref 27–31)
MCHC RBC AUTO-ENTMCNC: 30.5 G/DL (ref 32–36)
MCV RBC AUTO: 93 FL (ref 82–98)
MONOCYTES # BLD AUTO: 0.9 K/UL (ref 0.3–1)
MONOCYTES NFR BLD: 19 % (ref 4–15)
NEUTROPHILS # BLD AUTO: 2.8 K/UL (ref 1.8–7.7)
NEUTROPHILS NFR BLD: 57.9 % (ref 38–73)
NRBC BLD-RTO: 0 /100 WBC
PHOSPHATE SERPL-MCNC: 3.6 MG/DL (ref 2.7–4.5)
PLATELET # BLD AUTO: 185 K/UL (ref 150–450)
PMV BLD AUTO: 11.2 FL (ref 9.2–12.9)
POTASSIUM SERPL-SCNC: 5.3 MMOL/L (ref 3.5–5.1)
POTASSIUM SERPL-SCNC: 5.6 MMOL/L (ref 3.5–5.1)
PROT SERPL-MCNC: 5.5 G/DL (ref 6–8.4)
RBC # BLD AUTO: 2.83 M/UL (ref 4–5.4)
SARS-COV-2 RNA RESP QL NAA+PROBE: NOT DETECTED
SODIUM SERPL-SCNC: 139 MMOL/L (ref 136–145)
SODIUM SERPL-SCNC: 141 MMOL/L (ref 136–145)
WBC # BLD AUTO: 4.85 K/UL (ref 3.9–12.7)

## 2023-01-23 PROCEDURE — 99233 SBSQ HOSP IP/OBS HIGH 50: CPT | Mod: HCNC,,,

## 2023-01-23 PROCEDURE — 85025 COMPLETE CBC W/AUTO DIFF WBC: CPT | Mod: HCNC

## 2023-01-23 PROCEDURE — 80048 BASIC METABOLIC PNL TOTAL CA: CPT | Mod: HCNC,XB

## 2023-01-23 PROCEDURE — 27000207 HC ISOLATION: Mod: HCNC

## 2023-01-23 PROCEDURE — 25000003 PHARM REV CODE 250: Mod: HCNC | Performed by: HOSPITALIST

## 2023-01-23 PROCEDURE — U0005 INFEC AGEN DETEC AMPLI PROBE: HCPCS

## 2023-01-23 PROCEDURE — 97535 SELF CARE MNGMENT TRAINING: CPT | Mod: HCNC

## 2023-01-23 PROCEDURE — 97530 THERAPEUTIC ACTIVITIES: CPT | Mod: HCNC

## 2023-01-23 PROCEDURE — 25000003 PHARM REV CODE 250: Mod: HCNC

## 2023-01-23 PROCEDURE — 99233 PR SUBSEQUENT HOSPITAL CARE,LEVL III: ICD-10-PCS | Mod: HCNC,,,

## 2023-01-23 PROCEDURE — 83735 ASSAY OF MAGNESIUM: CPT | Mod: HCNC

## 2023-01-23 PROCEDURE — 93010 ELECTROCARDIOGRAM REPORT: CPT | Mod: HCNC,,, | Performed by: INTERNAL MEDICINE

## 2023-01-23 PROCEDURE — 25000003 PHARM REV CODE 250: Mod: HCNC | Performed by: PHYSICIAN ASSISTANT

## 2023-01-23 PROCEDURE — 63600175 PHARM REV CODE 636 W HCPCS: Mod: HCNC | Performed by: HOSPITALIST

## 2023-01-23 PROCEDURE — 36415 COLL VENOUS BLD VENIPUNCTURE: CPT | Mod: HCNC

## 2023-01-23 PROCEDURE — 80053 COMPREHEN METABOLIC PANEL: CPT | Mod: HCNC

## 2023-01-23 PROCEDURE — 84100 ASSAY OF PHOSPHORUS: CPT | Mod: HCNC

## 2023-01-23 PROCEDURE — 11000001 HC ACUTE MED/SURG PRIVATE ROOM: Mod: HCNC

## 2023-01-23 PROCEDURE — 93010 EKG 12-LEAD: ICD-10-PCS | Mod: HCNC,,, | Performed by: INTERNAL MEDICINE

## 2023-01-23 PROCEDURE — 93005 ELECTROCARDIOGRAM TRACING: CPT | Mod: HCNC

## 2023-01-23 RX ADMIN — ASPIRIN 81 MG: 81 TABLET, COATED ORAL at 09:01

## 2023-01-23 RX ADMIN — POLYETHYLENE GLYCOL 3350 17 G: 17 POWDER, FOR SOLUTION ORAL at 08:01

## 2023-01-23 RX ADMIN — SODIUM CHLORIDE 500 ML: 9 INJECTION, SOLUTION INTRAVENOUS at 11:01

## 2023-01-23 RX ADMIN — ENOXAPARIN SODIUM 30 MG: 100 INJECTION SUBCUTANEOUS at 05:01

## 2023-01-23 RX ADMIN — PRAVASTATIN SODIUM 40 MG: 40 TABLET ORAL at 09:01

## 2023-01-23 RX ADMIN — DICLOFENAC SODIUM 2 G: 10 GEL TOPICAL at 09:01

## 2023-01-23 RX ADMIN — ACETAMINOPHEN 1000 MG: 500 TABLET ORAL at 05:01

## 2023-01-23 RX ADMIN — SENNOSIDES 8.6 MG: 8.6 TABLET, FILM COATED ORAL at 09:01

## 2023-01-23 RX ADMIN — SODIUM ZIRCONIUM CYCLOSILICATE 10 G: 10 POWDER, FOR SUSPENSION ORAL at 11:01

## 2023-01-23 RX ADMIN — TRIAMCINOLONE ACETONIDE: 1 OINTMENT TOPICAL at 08:01

## 2023-01-23 RX ADMIN — SILVER SULFADIAZINE: 10 CREAM TOPICAL at 09:01

## 2023-01-23 RX ADMIN — SILVER SULFADIAZINE: 10 CREAM TOPICAL at 08:01

## 2023-01-23 RX ADMIN — Medication 6 MG: at 08:01

## 2023-01-23 RX ADMIN — OLANZAPINE 2.5 MG: 2.5 TABLET, FILM COATED ORAL at 08:01

## 2023-01-23 RX ADMIN — DICLOFENAC SODIUM 2 G: 10 GEL TOPICAL at 08:01

## 2023-01-23 RX ADMIN — MICONAZOLE NITRATE 2 % TOPICAL POWDER: at 11:01

## 2023-01-23 RX ADMIN — POLYETHYLENE GLYCOL 3350 17 G: 17 POWDER, FOR SOLUTION ORAL at 09:01

## 2023-01-23 RX ADMIN — TRIAMCINOLONE ACETONIDE: 1 OINTMENT TOPICAL at 11:01

## 2023-01-23 RX ADMIN — MICONAZOLE NITRATE 2 % TOPICAL POWDER: at 08:01

## 2023-01-23 RX ADMIN — SODIUM ZIRCONIUM CYCLOSILICATE 10 G: 10 POWDER, FOR SUSPENSION ORAL at 06:01

## 2023-01-23 NOTE — NURSING
Pt was seen at edge of bed putting clothes on. Pt states she's ready to leave and is packing her belongings. Nurse informed pt that discharge orders arent in and is pending Covid results. Pt has been educated on the purpose and use of call light. Pt has been informed to use call light when needed. Will continue to monitor..

## 2023-01-23 NOTE — PLAN OF CARE
Problem: Adult Inpatient Plan of Care  Goal: Plan of Care Review  Outcome: Ongoing, Progressing     Problem: Infection  Goal: Absence of Infection Signs and Symptoms  Outcome: Ongoing, Progressing     Problem: Fluid and Electrolyte Imbalance (Acute Kidney Injury/Impairment)  Goal: Fluid and Electrolyte Balance  1/23/2023 1648 by Saige Middleton RN  Outcome: Ongoing, Progressing  1/23/2023 1640 by Saige Middleton RN  Outcome: Ongoing, Progressing     Problem: Oral Intake Inadequate (Acute Kidney Injury/Impairment)  Goal: Optimal Nutrition Intake  1/23/2023 1648 by Saige Middleton RN  Outcome: Ongoing, Progressing  1/23/2023 1640 by Saige Middleton RN  Outcome: Ongoing, Progressing     Problem: Renal Function Impairment (Acute Kidney Injury/Impairment)  Goal: Effective Renal Function  1/23/2023 1648 by Saige Middleton RN  Outcome: Ongoing, Progressing  1/23/2023 1640 by Saige Middleton RN  Outcome: Ongoing, Progressing     Problem: Fall Injury Risk  Goal: Absence of Fall and Fall-Related Injury  Outcome: Ongoing, Progressing     Problem: Skin Injury Risk Increased  Goal: Skin Health and Integrity  Outcome: Ongoing, Progressing   Pt has been educated on the importance of using call light when need help.

## 2023-01-23 NOTE — ASSESSMENT & PLAN NOTE
- Creatinine 1.5 on admission, Cr today Estimated Creatinine Clearance: 25.5 mL/min (A) (based on SCr of 1.5 mg/dL (H)). (baseline 1.2)  - hold home lasix and aldactone  - cIVFs overnight, 500cc NS throughout 01/19  - Cr 2.1 --> 1.9 --> 1.5 --> 1.2 --> 1.5 - continue holding home lasix and spironolactone  - Continue to monitor on daily labs

## 2023-01-23 NOTE — ASSESSMENT & PLAN NOTE
Stage 3b chronic kidney disease  - overnight 01/05 K 6.6 - given calcium gluconate, albuterol, and lokelma  - 01/23 - K 5.6 --> 5.3 after lokelma  - monitor with labs  - monitor on tele

## 2023-01-23 NOTE — PLAN OF CARE
01/23/23 1107   Post-Acute Status   Post-Acute Authorization Placement   Post-Acute Placement Status Pending payor review/awaiting authorization (if required)   Hospital Resources/Appts/Education Provided Appointments scheduled by Navigator/Coordinator   Discharge Delays None known at this time   Discharge Plan   Discharge Plan A Skilled Nursing Facility   Discharge Plan B Home Health

## 2023-01-23 NOTE — PT/OT/SLP PROGRESS
"Physical Therapy   Progress Note    Patient Name:  Jenniffer Jimenez  MRN: [id number]    Admit Date: 1/4/2023  Admitting Diagnosis:  Cellulitis of right lower extremity  Length of Stay: 14 days  Recent Surgery: * No surgery found *      Recommendations:     Discharge Recommendations: Skilled Nursing Facility   Equipment recommendations: shower chair  Barriers to discharge: Increased level of skilled assistance required and Fall risk     Assessment:     Jenniffer Jimenez is a 89 y.o. female admitted to [facility] [place] 1[place]4/2023 with medical diagnosis of Cellulitis of right lower extremity. Pt presents with weakness, impaired endurance, impaired self care skills, impaired functional mobility, gait instability, impaired balance, decreased coordination, decreased safety awareness, impaired coordination, impaired skin. Pt is progressing towards goals, but has not yet reached prior level of function. Jenniffer Jimenez would benefit from continued acute PT intervention to improve quality of life, focus on recovery of impairments, provide patient/caregiver education, reduce fall risk, and maximize (I) and safety with functional mobility. Once medically stable, recommending pt discharge to Skilled Nursing Facility .      Rehab Prognosis: Good    Plan:     During this hospitalization, patient to be seen 3 x/week to address the identified rehab impairments via gait training, therapeutic activities, therapeutic exercises, neuromuscular re-education and progress towards stated goals.     Plan of Care Expires:  02/08/23  Plan of Care reviewed with: patient    This plan of care has been discussed with the patient/caregiver, who was included in its development and is in agreement with the identified goals and treatment plan.     Subjective     Communicated with RN prior to session.  Patient found supine upon PT entry to room, agreeable to therapy session. Pt alone during session.    Patient/Family Comments/goals: "I really want to " "shower"    Pain/Comfort:  Pain Rating 1: 0/10  Pain Rating Post-Intervention 1: 0/10    Patients cultural, spiritual, Adventism conflicts given the current situation: None identified     Objective:     Patient found with: bed alarm    General Precautions: Standard, fall   Orthopedic Precautions:N/A   Braces: N/A   Oxygen Device: room air    Cognition:  Pt is Alert during session.    Therapist provided skilled verbal and tactile cueing to facilitate the following functional mobility tasks. Listed tasks are focused on recovery of impairments and improving pt's independence and efficiency with bed mobility, transfers and ambulation as able.     Bed Mobility:  Supine > Sit: Stand-by Assistance  Sit > Supine: Stand-by Assistance    Transfers:   Sit <> Stand Transfer: Minimal Assistance from eob/toilet/BSC with RW AD   Pt attempted to prematurely sit during t/f back to bed: Mod required for safe t/f                 Gait:  Distance: 10 ft + 5 ft + 10 ft  Assistance level: Minimal Assistance  Assistive Device: rolling walker  Gait Assessment: decreased step length , decreased marco, decreased gait speed, and unsteady gait   Max v/c for safety awareness to keep RW close during gait    Balance:  Dynamic Sitting: FAIR+: Maintains balance through MINIMAL excursions of active trunk motion  Standing:  Static: POOR+: Needs MINIMAL assist to maintain   Dynamic: POOR+: Needs MIN (minimal ) assist during gait    Outcome Measure: AM-PAC 6 CLICK MOBILITY  Total Score:18     Patient/Caregiver Education and Additional Therapeutic Activities/Exercises   Assisted pt with showering     Provided pt/caregiver education regarding:   PT POC and goals for therapy   Safety with mobility and fall risk   Safe management of AD as needed   Energy conservation techniques   Instruction on use of call button and importance of calling nursing staff for assistance with mobility     Patient/caregiver able to verbalize understanding; will follow-up with " pt/caregiver during current admit for additional questions/concerns within scope of practice.     White board updated.     Patient left supine with all lines intact, call button in reach, and bed alarm on.    Goals:     Multidisciplinary Problems       Physical Therapy Goals          Problem: Physical Therapy    Goal Priority Disciplines Outcome Goal Variances Interventions   Physical Therapy Goal     PT, PT/OT Ongoing, Progressing     Description: Goals to be met by: 23     Patient will increase functional independence with mobility by performin. Sit to stand transfer with Stand-by Assistance  2. Gait  x 100 feet with Stand-by Assistance using LRAD.   3. Stand for 6 minutes with Stand-by Assistance using LRAD while performing dynamic tasks.                         Time Tracking:       PT Received On: 23  PT Start Time: 1021     PT Stop Time: 1103  PT Total Time (min): 42 min     Billable Minutes: Therapeutic Activity 42    2023

## 2023-01-23 NOTE — PT/OT/SLP PROGRESS
Occupational Therapy   Co-Treatment  Co-treatment with PT for maximal pt participation, safety, and activity tolerance     Name: Jenniffer Jimenez  MRN: [id number]  Admitting Diagnosis:  Cellulitis of right lower extremity       Recommendations:     Discharge Recommendations: nursing facility, skilled  Discharge Equipment Recommendations:  shower chair  Barriers to discharge:  Inaccessible home environment, Decreased caregiver support    Assessment:     Jenniffer Jimenez is a 89 y.o. female with a medical diagnosis of Cellulitis of right lower extremity.  She presents with impaired ADL and mobility performance deficits. Pt found upright in bed and agreeable for therapy. Pt session focused on toileting and provided shower today. Pt required SBA for bed mobility and min-mod A with RW. Pt would benefit from continued OT skilled services 3x/wk to improve daily living skills to optimize QOL. Pt is recommended to discharge to SNF at this time.   Performance deficits affecting function are weakness, gait instability, impaired endurance, impaired balance, impaired self care skills, impaired functional mobility, decreased upper extremity function, decreased lower extremity function.     Rehab Prognosis:  Good; patient would benefit from acute skilled OT services to address these deficits and reach maximum level of function.       Plan:     Patient to be seen 3 x/week to address the above listed problems via self-care/home management, therapeutic activities, therapeutic exercises  Plan of Care Expires: 02/09/23  Plan of Care Reviewed with: patient    Subjective     Pain/Comfort:  Pain Rating 1: 0/10  Pain Rating Post-Intervention 1: 0/10  Pain Rating Post-Intervention 2: 0/10    Objective:     Communicated with: RN prior to session.  Patient found HOB elevated with bed alarm upon OT entry to room.    General Precautions: Standard, fall    Orthopedic Precautions:N/A  Braces: N/A  Respiratory Status: Room air     Occupational  Performance:     Bed Mobility:    Patient completed Rolling/Turning to Left with  stand by assistance  Patient completed Scooting/Bridging with stand by assistance  Patient completed Supine to Sit with stand by assistance  Patient completed Sit to Supine with stand by assistance     Functional Mobility/Transfers:  Patient completed Sit <> Stand Transfer with minimum assistance  with  rolling walker   Patient completed Toilet Transfer Step Transfer technique with moderate assistance with  rolling walker  Patient completed  Shower Transfer Step Transfer technique with moderate assistance with rolling walker  Functional Mobility: Pt stood from bed with min A with RW and mobilized to restroom for seated toileting. Pt stood and mobilized from commode to shower for seated bathing    Activities of Daily Living:  Grooming: setup A  for EOB oral care   Upper Body Dressing: minimum assistance donning gown  Toileting: total assistance for pericare      AMPAC 6 Click ADL: 14    Treatment & Education:  Pt educated on role of occupational therapy, POC, and safety during ADLs and functional mobility. Pt and OT discussed importance of safe, continued mobility to optimize daily living skills. Pt verbalized understanding.   White board updated during session. Pt given instruction to call for medical staff/nurse for assistance.       Patient left HOB elevated with all lines intact, call button in reach, bed alarm on, and RN notified    GOALS:   Multidisciplinary Problems       Occupational Therapy Goals          Problem: Occupational Therapy    Goal Priority Disciplines Outcome Interventions   Occupational Therapy Goal     OT, PT/OT Ongoing, Progressing    Description: Goals to be met by: 1/16/2023 (1 week)     Patient will increase functional independence with ADLs by performing:    UE Dressing with Supervision.  LE Dressing with Supervision.  Grooming while standing at sink with Stand-by Assistance.  Toileting from toilet with  Stand-by Assistance for hygiene and clothing management.   Rolling to Bilateral with Crosby.   Supine to sit with [place].  Step transfer with Stand-by Assistance  Toilet transfer to toilet with Stand-by Assistance.                         Time Tracking:     OT Date of Treatment: 01/23/23  OT Start Time: 1021  OT Stop Time: 1103  OT Total Time (min): 42 min    Billable Minutes:Self Care/Home Management 42 min    OT/ROMERO: OT          1/23/2023

## 2023-01-23 NOTE — SUBJECTIVE & OBJECTIVE
Interval History:   NAEON, VSSAF on my exam. Patient without complaints. CBC without leukocytosis. CMP showing Cr improving 1.5 and K improving to 5.3. Holding lasix and spironolactone, given Lokelma. [facility] authorization complete, will discharge to SNF in AM.    Review of Systems   Constitutional:  Negative for appetite change, chills and fever.   HENT:  Negative for congestion, trouble swallowing and voice change.    Eyes:  Negative for photophobia and visual disturbance.   Respiratory:  Negative for cough, chest tightness and wheezing.    Cardiovascular:  Negative for chest pain, palpitations and leg swelling.   Gastrointestinal:  Negative for abdominal pain, constipation, diarrhea, nausea and vomiting.   Endocrine: Negative for polyphagia and polyuria.   Genitourinary:  Negative for decreased urine volume, difficulty urinating, dysuria, flank pain and hematuria.   Musculoskeletal:  Positive for arthralgias (R hip). Negative for back pain and gait problem.   Skin:  Positive for color change and wound.   Neurological:  Negative for dizziness, seizures, syncope, weakness, numbness and headaches.   Psychiatric/Behavioral:  Negative for agitation, behavioral problems and confusion.    Objective:     Vital Signs (Most Recent):  Temp: 98.1 °F (36.7 °C) (01/23/23 1509)  Pulse: 80 (01/23/23 1509)  Resp: 18 (01/23/23 1509)  BP: (!) 158/72 (01/23/23 1509)  SpO2: 96 % (01/23/23 1509)   Vital Signs (24h Range):  Temp:  [97.6 °F (36.4 °C)-98.4 °F (36.9 °C)] 98.1 °F (36.7 °C)  Pulse:  [57-80] 80  Resp:  [18] 18  SpO2:  [93 %-98 %] 96 %  BP: (110-158)/(53-72) 158/72     Weight: 76.8 kg (169 lb 5 oz)  Body mass index is 28.18 kg/m².  No intake or output data in the 24 hours ending 01/23/23 1737   Physical Exam  Vitals and nursing note reviewed.   Constitutional:       General: She is not in acute distress.     Appearance: She is well-developed.      Comments: Hard of hearing   HENT:      Head: Normocephalic and atraumatic.       Mouth/Throat:      Mouth: Mucous membranes are moist.      Pharynx: No oropharyngeal exudate.   Eyes:      Extraocular Movements: Extraocular movements intact.      Conjunctiva/sclera: Conjunctivae normal.      Pupils: Pupils are equal, round, and reactive to light.   Cardiovascular:      Rate and Rhythm: Normal rate and regular rhythm.      Heart sounds: Normal heart sounds.   Pulmonary:      Effort: Pulmonary effort is normal. No respiratory distress.      Breath sounds: Normal breath sounds. No wheezing or rales.   Abdominal:      General: Bowel sounds are normal. There is no distension.      Palpations: Abdomen is soft.      Tenderness: There is no abdominal tenderness.      Comments: Candida intertrigo noted to gluteal folds, groin, lower extremities and feet   Musculoskeletal:         General: No swelling or tenderness. Normal range of motion.      Cervical back: Normal range of motion and neck supple.   Lymphadenopathy:      Cervical: No cervical adenopathy.   Skin:     General: Skin is warm and dry.      Capillary Refill: Capillary refill takes less than 2 seconds.      Findings: Erythema and rash (upper and lower extremities, trunk, and back (improved from previous) present.   Neurological:      Mental Status: She is alert and oriented to person, place, and time.      Cranial Nerves: No cranial nerve deficit.      Sensory: No sensory deficit.      Coordination: Coordination normal.   Psychiatric:         Behavior: Behavior normal.         Thought Content: Thought content normal.         Judgment: Judgment normal.       Significant Labs: All pertinent labs within the past 24 hours have been reviewed.  CBC:   Recent Labs   Lab 01/22/23  1130 01/23/23  0329   WBC 5.16 4.85   HGB 9.0* 8.0*   HCT 28.7* 26.2*    185     CMP:   Recent Labs   Lab 01/22/23  1130 01/23/23  0329 01/23/23  1300    141 139   K 5.7* 5.6* 5.3*   * 112* 109   CO2 22* 19* 20*   GLU 73 105 132*   BUN 45* 47* 42*    CREATININE 1.6* 1.6* 1.5*   CALCIUM 8.6* 8.3* 8.6*   PROT  --  5.5*  --    ALBUMIN  --  2.6*  --    BILITOT  --  0.2  --    ALKPHOS  --  116  --    AST  --  12  --    ALT  --  12  --    ANIONGAP 7* 10 10       Significant Imaging: I have reviewed all pertinent imaging results/findings within the past 24 hours.
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
## 2023-01-23 NOTE — PLAN OF CARE
[facility] 11[facility]  Discharge Final Note    Primary Care Provider: Rubi Young DO  Expected Discharge Date: 1/23/2023  Patient medically ready for discharge to Skilled.  Nurse can call report to [phone].  Transportation stretcher per ambulance.   Is family/patient aware of discharge Yes , .  Hospital follow up scheduled, .    Final Discharge Note (most recent)       Final Note - 01/23/23 1423          Final Note    Assessment Type Final Discharge Note (P)      Anticipated Discharge Disposition Skilled Nursing Facility (P)      What phone number can be called within the next 1-3 days to see how you are doing after discharge? [phone] (P)      Hospital Resources/Appts/Education Provided Appointments scheduled by Navigator/Coordinator (P)         Post-Acute Status    Post-Acute Authorization Placement (P)      Post-Acute Placement Status Set-up Complete/Auth obtained (P)      Discharge Delays None known at this time (P)                    Important Message from Medicare  Important Message from Medicare regarding Discharge Appeal Rights: Given to patient/caregiver, Explained to patient/caregiver, Other (comments) (Patient refused to sign)   Date IMM was signed: 01/23/23  Time IMM was signed: 0750  Referral Info (most recent)       Referral Info    No documentation.                 Contact Info       [facility] - Emergency Dept   Specialty: Emergency Medicine    [address]  [facility] [address]   Phone: [phone]       Next Steps: Go to    Instructions: As needed, If symptoms worsen    Rubi Young DO   Specialty: Internal Medicine   Relationship: PCP - General    [address] [facility] [address]   Phone: [phone]       Next Steps: Schedule an appointment as soon as possible for a visit in 1 day(s)    Wound Care Clinic        Next Steps: Schedule an appointment as soon as possible for a visit in 3 week(s)    Instructions: Wound care will be reaching out to  patients daughter.          Future Appointments   Date Time Provider Department Center   1/24/2023  9:30 AM Brent Chapman Jr., MD [facility] CARDIO [place]   1/30/2023  1:30 PM Agnes Mcneil PA-C [facility] ID [facility]   2/10/2023  1:45 PM Lionel Garland MD [facility] DERM [facility]   2/16/2023 11:30 AM Marty Carrasquillo MD [facility] IM [place]   3/30/2023 10:00 AM Rubi Young DO [facility] IM [place]   6/29/2023  8:30 AM Rachael Case MD [facility] IM [place]     [place], RN  Case Management  Ext: [phone]  01/23/2023

## 2023-01-23 NOTE — PLAN OF CARE
Problem: Adult Inpatient Plan of Care  Goal: Plan of Care Review  Outcome: Ongoing, Progressing     Problem: Infection  Goal: Absence of Infection Signs and Symptoms  Outcome: Ongoing, Progressing     Problem: Fluid and Electrolyte Imbalance (Acute Kidney Injury/Impairment)  Goal: Fluid and Electrolyte Balance  Outcome: Ongoing, Progressing     Problem: Oral Intake Inadequate (Acute Kidney Injury/Impairment)  Goal: Optimal Nutrition Intake  Outcome: Ongoing, Progressing     Problem: Renal Function Impairment (Acute Kidney Injury/Impairment)  Goal: Effective Renal Function  Outcome: Ongoing, Progressing

## 2023-01-23 NOTE — ASSESSMENT & PLAN NOTE
- Pt complaining of occasional SOB with volume overload  -   - CXR with coarse interstitial lung markings and cardiomegaly   - last echo below  - JOSHUA - holding home lasix bid & aldactone  - strict I/Os, daily weights    Results for orders placed during the hospital encounter of 10/07/22  Echo  Interpretation Summary  · The left ventricle is normal in size with concentric remodeling and normal systolic function.  · The estimated ejection fraction is 55-60%.  · Grade III left ventricular diastolic dysfunction.  · Mild mitral regurgitation.  · Normal right ventricular size with mildly reduced right ventricular systolic function.  · Severe tricuspid regurgitation.  · The estimated PA systolic pressure is 52 mmHg. PASP may be under-estimated due to TR severity.  · Elevated central venous pressure (15 mmHg).  · There is pulmonary hypertension.  · Severe left atrial enlargement.

## 2023-01-24 VITALS
OXYGEN SATURATION: 96 % | HEIGHT: 65 IN | BODY MASS INDEX: 28.21 KG/M2 | TEMPERATURE: 98 F | WEIGHT: 169.31 LBS | DIASTOLIC BLOOD PRESSURE: 64 MMHG | SYSTOLIC BLOOD PRESSURE: 138 MMHG | HEART RATE: 94 BPM | RESPIRATION RATE: 18 BRPM

## 2023-01-24 PROBLEM — R41.0 DELIRIUM: Status: RESOLVED | Noted: 2023-01-09 | Resolved: 2023-01-24

## 2023-01-24 PROBLEM — N17.9 AKI (ACUTE KIDNEY INJURY): Status: RESOLVED | Noted: 2023-01-05 | Resolved: 2023-01-24

## 2023-01-24 LAB
ALBUMIN SERPL BCP-MCNC: 3.2 G/DL (ref 3.5–5.2)
ALP SERPL-CCNC: 146 U/L (ref 55–135)
ALT SERPL W/O P-5'-P-CCNC: 19 U/L (ref 10–44)
ANION GAP SERPL CALC-SCNC: 8 MMOL/L (ref 8–16)
AST SERPL-CCNC: 15 U/L (ref 10–40)
BASOPHILS # BLD AUTO: 0.04 K/UL (ref 0–0.2)
BASOPHILS NFR BLD: 0.6 % (ref 0–1.9)
BILIRUB SERPL-MCNC: 0.7 MG/DL (ref 0.1–1)
BUN SERPL-MCNC: 31 MG/DL (ref 8–23)
CALCIUM SERPL-MCNC: 9 MG/DL (ref 8.7–10.5)
CHLORIDE SERPL-SCNC: 107 MMOL/L (ref 95–110)
CO2 SERPL-SCNC: 23 MMOL/L (ref 23–29)
CREAT SERPL-MCNC: 1.1 MG/DL (ref 0.5–1.4)
DIFFERENTIAL METHOD: ABNORMAL
EOSINOPHIL # BLD AUTO: 0.1 K/UL (ref 0–0.5)
EOSINOPHIL NFR BLD: 1.2 % (ref 0–8)
ERYTHROCYTE [DISTWIDTH] IN BLOOD BY AUTOMATED COUNT: 17.5 % (ref 11.5–14.5)
EST. GFR  (NO RACE VARIABLE): 47.7 ML/MIN/1.73 M^2
GLUCOSE SERPL-MCNC: 102 MG/DL (ref 70–110)
HCT VFR BLD AUTO: 30.1 % (ref 37–48.5)
HGB BLD-MCNC: 9.4 G/DL (ref 12–16)
IMM GRANULOCYTES # BLD AUTO: 0.09 K/UL (ref 0–0.04)
IMM GRANULOCYTES NFR BLD AUTO: 1.4 % (ref 0–0.5)
LYMPHOCYTES # BLD AUTO: 0.7 K/UL (ref 1–4.8)
LYMPHOCYTES NFR BLD: 11.1 % (ref 18–48)
MAGNESIUM SERPL-MCNC: 2.1 MG/DL (ref 1.6–2.6)
MCH RBC QN AUTO: 28 PG (ref 27–31)
MCHC RBC AUTO-ENTMCNC: 31.2 G/DL (ref 32–36)
MCV RBC AUTO: 90 FL (ref 82–98)
MONOCYTES # BLD AUTO: 0.9 K/UL (ref 0.3–1)
MONOCYTES NFR BLD: 13.4 % (ref 4–15)
NEUTROPHILS # BLD AUTO: 4.7 K/UL (ref 1.8–7.7)
NEUTROPHILS NFR BLD: 72.3 % (ref 38–73)
NRBC BLD-RTO: 0 /100 WBC
PHOSPHATE SERPL-MCNC: 3.4 MG/DL (ref 2.7–4.5)
PLATELET # BLD AUTO: 262 K/UL (ref 150–450)
PMV BLD AUTO: 11.4 FL (ref 9.2–12.9)
POTASSIUM SERPL-SCNC: 5.3 MMOL/L (ref 3.5–5.1)
PROT SERPL-MCNC: 6.9 G/DL (ref 6–8.4)
RBC # BLD AUTO: 3.36 M/UL (ref 4–5.4)
SODIUM SERPL-SCNC: 138 MMOL/L (ref 136–145)
WBC # BLD AUTO: 6.48 K/UL (ref 3.9–12.7)

## 2023-01-24 PROCEDURE — 25000003 PHARM REV CODE 250: Mod: HCNC

## 2023-01-24 PROCEDURE — 36415 COLL VENOUS BLD VENIPUNCTURE: CPT | Mod: HCNC

## 2023-01-24 PROCEDURE — 25000003 PHARM REV CODE 250: Mod: HCNC | Performed by: PHYSICIAN ASSISTANT

## 2023-01-24 PROCEDURE — 99239 HOSP IP/OBS DSCHRG MGMT >30: CPT | Mod: HCNC,,,

## 2023-01-24 PROCEDURE — 80053 COMPREHEN METABOLIC PANEL: CPT | Mod: HCNC

## 2023-01-24 PROCEDURE — 83735 ASSAY OF MAGNESIUM: CPT | Mod: HCNC

## 2023-01-24 PROCEDURE — 93005 ELECTROCARDIOGRAM TRACING: CPT | Mod: HCNC

## 2023-01-24 PROCEDURE — 99239 PR HOSPITAL DISCHARGE DAY,>30 MIN: ICD-10-PCS | Mod: HCNC,,,

## 2023-01-24 PROCEDURE — 84100 ASSAY OF PHOSPHORUS: CPT | Mod: HCNC

## 2023-01-24 PROCEDURE — 30200315 PPD INTRADERMAL TEST REV CODE 302: Mod: HCNC | Performed by: HOSPITALIST

## 2023-01-24 PROCEDURE — 86580 TB INTRADERMAL TEST: CPT | Mod: HCNC | Performed by: HOSPITALIST

## 2023-01-24 PROCEDURE — 85025 COMPLETE CBC W/AUTO DIFF WBC: CPT | Mod: HCNC

## 2023-01-24 RX ORDER — LIDOCAINE 50 MG/G
1 PATCH TOPICAL DAILY
Qty: 5 PATCH | Refills: 0 | Status: SHIPPED | OUTPATIENT
Start: 2023-01-24 | End: 2023-04-12

## 2023-01-24 RX ORDER — MICONAZOLE NITRATE 2 %
POWDER (GRAM) TOPICAL 2 TIMES DAILY
Refills: 0
Start: 2023-01-24 | End: 2023-04-21

## 2023-01-24 RX ORDER — DICLOFENAC SODIUM 10 MG/G
2 GEL TOPICAL 4 TIMES DAILY
Qty: 100 G | Refills: 1 | Status: SHIPPED | OUTPATIENT
Start: 2023-01-24 | End: 2023-04-21 | Stop reason: SDUPTHER

## 2023-01-24 RX ADMIN — ASPIRIN 81 MG: 81 TABLET, COATED ORAL at 12:01

## 2023-01-24 RX ADMIN — POLYETHYLENE GLYCOL 3350 17 G: 17 POWDER, FOR SOLUTION ORAL at 12:01

## 2023-01-24 RX ADMIN — ACETAMINOPHEN 1000 MG: 500 TABLET ORAL at 12:01

## 2023-01-24 RX ADMIN — TRIAMCINOLONE ACETONIDE: 1 OINTMENT TOPICAL at 09:01

## 2023-01-24 RX ADMIN — SENNOSIDES 8.6 MG: 8.6 TABLET, FILM COATED ORAL at 12:01

## 2023-01-24 RX ADMIN — SODIUM ZIRCONIUM CYCLOSILICATE 10 G: 10 POWDER, FOR SUSPENSION ORAL at 03:01

## 2023-01-24 RX ADMIN — TUBERCULIN PURIFIED PROTEIN DERIVATIVE 5 UNITS: 5 INJECTION, SOLUTION INTRADERMAL at 03:01

## 2023-01-24 RX ADMIN — SODIUM ZIRCONIUM CYCLOSILICATE 10 G: 10 POWDER, FOR SUSPENSION ORAL at 12:01

## 2023-01-24 RX ADMIN — SILVER SULFADIAZINE: 10 CREAM TOPICAL at 09:01

## 2023-01-24 RX ADMIN — ACETAMINOPHEN 1000 MG: 500 TABLET ORAL at 01:01

## 2023-01-24 RX ADMIN — MICONAZOLE NITRATE 2 % TOPICAL POWDER: at 09:01

## 2023-01-24 RX ADMIN — DICLOFENAC SODIUM 2 G: 10 GEL TOPICAL at 09:01

## 2023-01-24 NOTE — PLAN OF CARE
[facility]  Discharge Final Note    Primary Care Provider: Rubi Young DO    Expected Discharge Date: 1/24/2023    Final Discharge Note (most recent)       Final Note - 01/24/23 1443          Final Note    Assessment Type Final Discharge Note     Anticipated Discharge Disposition Skilled Nursing Facility     What phone number can be called within the next 1-3 days to see how you are doing after discharge? [phone]     Hospital Resources/Appts/Education Provided Provided patient/caregiver with written discharge plan information;Appointments scheduled by Navigator/Coordinator        Post-Acute Status    Post-Acute Placement Status Set-up Complete/Auth obtained     Patient choice form signed by patient/caregiver List from System Post-Acute Care     Discharge Delays Ambulance Transport/Facility Transport                     Important Message from Medicare  Important Message from Medicare regarding Discharge Appeal Rights: Given to patient/caregiver, Explained to patient/caregiver, Other (comments) (Patient refused to sign)     Date IMM was signed: 01/23/23  Time IMM was signed: 0750    Contact Info       [facility] - Emergency Dept   Specialty: Emergency Medicine    [address]  [facility] [address]   Phone: [phone]       Next Steps: Go to    Instructions: As needed, If symptoms worsen    Rubi Young DO   Specialty: Internal Medicine   Relationship: PCP - General    [address] [facility] [address]   Phone: [phone]       Next Steps: Schedule an appointment as soon as possible for a visit in 1 week(s)    Wound Care Clinic        Next Steps: Schedule an appointment as soon as possible for a visit in 3 week(s)    Instructions: Wound care will be reaching out to patients daughter.          Pt discharging to [facility], transportation via stretcher set up for 3:15 PM. RN and family, Pt's POA, notified.     RN to call report to [phone], Pt going  "to room 49A, RN is "Marian".    Pt's appointments are scheduled and in her AVS. Pt has no other post acute needs and is cleared for discharge from a case management standpoint.     SARAH Franco LMSW [facility]  [id number]      "

## 2023-01-24 NOTE — NURSING
Informed by PCT pt was standing in her doorway and would not go back to bed. Upon meeting pt she was non-redirectable and would not get back in the bed stating she wanted to go home now. Pt informed about safety concerns as she is a fall risk. Pt finally move from doorway but would not get in bed she is now sitting on the chair part of her walker. Attempted to given PRN olanzapine pt refused.

## 2023-01-24 NOTE — DISCHARGE SUMMARY
[facility] - Observation [address] [place] [address] Medicine  Discharge Summary      Patient Name: Jenniffer Jimenez  MRN: [id number]  NANCY: [id number]  Patient Class: IP- Inpatient  Admission Date: 1/4/2023  Hospital Length of Stay: 15 days  Discharge Date and Time:  01/24/2023 4:48 PM  Attending Physician: Nely Chahal MD   Discharging Provider: Paul Curtis PA-C  Primary Care Provider: Rubi Young DO  [facility] Medicine Team: [facility] MED Y Paul Curtis PA-C  Primary Care Team: [facility] MED Y    HPI:   Jenniffer Jimenez is a 89 y.o. female with a PMHx of HLD, HTN, AF s/p watchman, chronic cellulitis/ wound of her right lower extremity who presents to [facility] for evaluation of worsening redness and pain to her right lwoer extremity. Patient is a poor historian and has difficulty hearing. Per ED note, patient receives wound care for chronic RLE cellulitis. Wound care nuse noted worsening redness, weeping, and pain to her RLE. Apparently patient was supposed to be on ciprofloxacin but had a bad reaction so she has been off of it for the past 2 weeks.  The patient and has not had an alternative antibiotic prescribed but reportedly is supposed to be on an antibiotic. Patient also complains of intermittent shortness of breath recently which mostly occurs on exertion. Denies any fever, nausea, vomiting, chest pain, numbness/tingling, weakness, slurred speech, or recent falls.     ED: AFVSS. No leukocytosis. K 5.9, Cr 1.5 (BL ~1.2). EKG, CXR pending. Given IV vanc.      * No surgery found *      Hospital Course:   Pt placed in observation for management of worsening RLE cellulitis. Pt received IV vancomycin and ciprofloxacin initially, vancomycin discontinued. Derm consulted for worsening rash, ciprofloxacin discontinued for suspected drug reaction, and the pt was started on doxycycline. Psych consulted for worsening delirium and agitation and pt returned to mental baseline. ID consulted for worsening cellulitis. ID  recommends stopping abx and treating with antifungals and steroids. JOSHUA improving s/p IVF, Cr back to baseline. Discontinued lasix and spironolactone, continue lokelma outpatient. Patient medically stable and ready for discharge to SNF. [facility] accepting. Will discharge to SNF. SNF orders updated. Patient will follow up with wound clinic, vascular, infectious disease, and dermatology. Plan of care discussed with patient, patient agreeable to plan, and all questions answered.        Goals of Care Treatment Preferences:  Code Status: Full Code      Consults:   Consults (From admission, onward)        Status Ordering Provider     Inpatient consult to PICC team (\[facility] [place] [facility]\"")  Once        Provider:  (Not yet assigned)    Completed ALDEN KINSEY     Inpatient consult to Infectious Diseases  Once        Provider:  (Not yet assigned)    Completed DANILO MILLER     Inpatient consult to Dermatology  Once        Provider:  (Not yet assigned)    Completed YAJAIRA CANTRELL     Inpatient consult to Psychiatry  Once        Provider:  (Not yet assigned)    Completed YAJAIRA CANTRELL     Inpatient consult to PICC team (\[facility] [place] [facility]\"")  Once        Provider:  (Not yet assigned)    Completed ALDEN KINSEY          No new Assessment & Plan notes have been filed under this hospital service since the last note was generated.  Service: Hospital Medicine    Final Active Diagnoses:    Diagnosis Date Noted POA    PRINCIPAL PROBLEM:  Cellulitis of right lower extremity [L03.115] 10/07/2022 Yes    High anion gap metabolic acidosis [E87.29] 01/12/2023 No    Drug eruption [L27.0] 01/11/2023 No    Intertrigo [L30.4] 01/08/2023 Yes    Trochanteric bursitis of right hip [M70.61] 01/06/2023 Yes    Hyperkalemia [E87.5] 01/05/2023 Yes    Overweight (BMI 25.0-29.9) [E66.3] 01/21/2019 Yes    Stage 3b chronic kidney disease [N18.32] 05/04/2016 Yes    Chronic diastolic heart failure [I50.32] 05/04/2016 Yes     Chronic    Chronic atrial fibrillation  [I48.20] 01/29/2016 Yes     Chronic    Gait instability [R26.81] 06/12/2013 Yes    Debility [R53.81] 04/25/2013 Yes    Primary hypertension [I10]  Yes     Chronic    Pure hypercholesterolemia [E78.00] 07/02/2012 Yes     Chronic      Problems Resolved During this Admission:    Diagnosis Date Noted Date Resolved POA    Delirium [R41.0] 01/09/2023 01/24/2023 No    JOSHUA (acute kidney injury) [N17.9] 01/05/2023 01/24/2023 Yes       Discharged Condition: stable    Disposition: Home or Self Care    Follow Up:   Follow-up Information     [facility] - Emergency Dept. Go to .    Specialty: Emergency Medicine  Why: As needed, If symptoms worsen  Contact information:  [address]  [facility] [address] [phone]           Rubi Young DO. Schedule an appointment as soon as possible for a visit in 1 week(s).    Specialty: Internal Medicine  Contact information:  [address] [facility] [address]  [phone]             Wound Care Clinic. Schedule an appointment as soon as possible for a visit in 3 week(s).    Why: Wound care will be reaching out to patients daughter.                     Patient Instructions:      Ambulatory referral/consult to Infectious Disease   Standing Status: Future   Referral Priority: Urgent Referral Type: Consultation   Referral Reason: Specialty Services Required   Requested Specialty: Infectious Diseases   Number of Visits Requested: 1     Ambulatory referral/consult to Dermatology   Standing Status: Future   Referral Priority: Urgent Referral Type: Consultation   Referral Reason: Specialty Services Required   Requested Specialty: Dermatology   Number of Visits Requested: 1     Ambulatory referral/consult to Vascular Surgery   Standing Status: Future   Referral Priority: Urgent Referral Type: Consultation   Referral Reason: Specialty Services Required   Requested Specialty: Vascular Surgery   Number of Visits Requested: 1     Ambulatory referral/consult to  Wound Clinic   Standing Status: Future   Referral Priority: Urgent Referral Type: Consultation   Referral Reason: Specialty Services Required   Requested Specialty: Wound Care   Number of Visits Requested: 1     Diet Cardiac     Notify your health care provider if you experience any of the following:  redness, tenderness, or signs of infection (pain, swelling, redness, odor or green/yellow discharge around incision site)     Notify your health care provider if you experience any of the following:  temperature >100.4     Notify your health care provider if you experience any of the following:  severe uncontrolled pain     Activity as tolerated       Significant Diagnostic Studies: Labs:   CMP   Recent Labs   Lab 01/23/23  0329 01/23/23  1300 01/24/23  0815    139 138   K 5.6* 5.3* 5.3*   * 109 107   CO2 19* 20* 23    132* 102   BUN 47* 42* 31*   CREATININE 1.6* 1.5* 1.1   CALCIUM 8.3* 8.6* 9.0   PROT 5.5*  --  6.9   ALBUMIN 2.6*  --  3.2*   BILITOT 0.2  --  0.7   ALKPHOS 116  --  146*   AST 12  --  15   ALT 12  --  19   ANIONGAP 10 10 8   , CBC   Recent Labs   Lab 01/23/23  0329 01/24/23  0815   WBC 4.85 6.48   HGB 8.0* 9.4*   HCT 26.2* 30.1*    262     Medications:  Reconciled Home Medications:      Medication List      START taking these medications    miconazole NITRATE 2 % 2 % top powder  Commonly known as: MICOTIN  Apply topically 2 (two) times daily. To RLE.  Replaces: CRITIC-AID CLEAR AF(MICONAZOL) 2 % Oint     * OLANZapine 2.5 MG tablet  Commonly known as: ZyPREXA  Take 1 tablet (2.5 mg total) by mouth every 8 (eight) hours as needed (non-redirectable agitation).     * OLANZapine 2.5 MG tablet  Commonly known as: ZyPREXA  Take 1 tablet (2.5 mg total) by mouth every evening.     silver sulfADIAZINE 1% 1 % cream  Commonly known as: SILVADENE  Apply to RLE skin breakdown twice daily     sodium zirconium cyclosilicate 5 gram packet  Commonly known as: LOKELMA  Take 1 packet (5 g total)  by mouth 2 (two) times a day. Mix entire contents of packet(s) into drinking glass containing 3 tablespoons of water; stir well and drink immediately. Add water and repeat until no powder remains to receive entire dose.     triamcinolone acetonide 0.1% 0.1 % ointment  Commonly known as: KENALOG  Apply to entire body twice daily         * This list has 2 medication(s) that are the same as other medications prescribed for you. Read the directions carefully, and ask your doctor or other care provider to review them with you.            CHANGE how you take these medications    diclofenac sodium 1 % Gel  Commonly known as: VOLTAREN  Apply 2 g topically 4 (four) times daily. Use gloves to apply to right hip for 10 days  What changed: additional instructions     LIDOcaine 5 %  Commonly known as: LIDODERM  Place 1 patch onto the skin once daily. Remove & Discard patch within 12 hours or as directed by provider. Apply to right hip.  What changed: additional instructions        CONTINUE taking these medications    acetaminophen 500 MG tablet  Commonly known as: TYLENOL  Take 1,000 mg by mouth 2 (two) times a day.     aspirin 81 MG EC tablet  Commonly known as: ECOTRIN  Take 1 tablet (81 mg total) by mouth once daily.     azelastine 137 mcg (0.1 %) nasal spray  Commonly known as: ASTELIN  1 spray (137 mcg total) by Nasal route 2 (two) times daily.     famotidine 20 MG tablet  Commonly known as: PEPCID  Take 1 tablet (20 mg total) by mouth 2 (two) times daily. For Allergic Reaction.     fluticasone propionate 50 mcg/actuation nasal spray  Commonly known as: FLONASE  USE 1 SPRAY IN EACH NOSTRIL ONE TIME DAILY     guaiFENesin 100 mg/5 ml 100 mg/5 mL syrup  Commonly known as: ROBITUSSIN  Take 15 mLs by mouth once daily.     levocetirizine 5 MG tablet  Commonly known as: XYZAL  Take 1 tablet (5 mg total) by mouth once daily. For Allergies.     OCUVITE LUTEIN AND ZEAXANTHIN ORAL  Take 1 capsule by mouth once daily. Lutien 25 mg,  Zeaxanthin 5 mg     pravastatin 40 MG tablet  Commonly known as: PRAVACHOL  Take 1 tablet (40 mg total) by mouth once daily.     vitamin E 400 UNIT capsule  Take 400 Units by mouth once daily.        STOP taking these medications    clotrimazole 1 % cream  Commonly known as: LOTRIMIN     CRITIC-AID CLEAR AF(MICONAZOL) 2 % Oint  Generic drug: miconazole nitrate 2%  Replaced by: miconazole NITRATE 2 % 2 % top powder     furosemide 20 MG tablet  Commonly known as: LASIX     gentamicin 0.1 % cream  Commonly known as: GARAMYCIN     mupirocin 2 % ointment  Commonly known as: BACTROBAN     spironolactone 25 MG tablet  Commonly known as: ALDACTONE            Indwelling Lines/Drains at time of discharge:   Lines/Drains/Airways     None                 Time spent on the discharge of patient: 33 minutes         Paul Curtis PA-C  Department of Hospital Medicine  [facility] - Observation 11H

## 2023-01-24 NOTE — PLAN OF CARE
Discharge instructions, diagnosis, medications, and follow up discussed with patient. Patient verbalized understanding. All questions and concerns answered. No needs expressed at the time. Pt is awake, alert and oriented with no acute distress noted. Respirations even and unlabored. Pt escorted kiya by [facility] x2 via stretcher.    Problem: Adult Inpatient Plan of Care  Goal: Plan of Care Review  Outcome: Met  Goal: Patient-Specific Goal (Individualized)  Outcome: Met  Goal: Absence of Hospital-Acquired Illness or Injury  Outcome: Met  Goal: Optimal Comfort and Wellbeing  Outcome: Met  Goal: Readiness for Transition of Care  Outcome: Met     Problem: Infection  Goal: Absence of Infection Signs and Symptoms  Outcome: Met     Problem: Diabetes Comorbidity  Goal: Blood Glucose Level Within Targeted Range  Outcome: Met     Problem: Fluid and Electrolyte Imbalance (Acute Kidney Injury/Impairment)  Goal: Fluid and Electrolyte Balance  Outcome: Met     Problem: Oral Intake Inadequate (Acute Kidney Injury/Impairment)  Goal: Optimal Nutrition Intake  Outcome: Met     Problem: Renal Function Impairment (Acute Kidney Injury/Impairment)  Goal: Effective Renal Function  Outcome: Met     Problem: Impaired Wound Healing  Goal: Optimal Wound Healing  Outcome: Met     Problem: Fall Injury Risk  Goal: Absence of Fall and Fall-Related Injury  Outcome: Met     Problem: Skin Injury Risk Increased  Goal: Skin Health and Integrity  Outcome: Met

## 2023-01-24 NOTE — PLAN OF CARE
"SW left a message for Luciana at [facility]. [facility]     SW will follow.    1:07 PM  SW has uploaded updated orders, chest xray, PASRR/142, MAR, medication history. Waiting for PPD to be placed.     2:00 PM  [facility] wants Pt's RN to call report to [phone], Pt is going to room 49A, nurse is "Marian". Once the Pt's PPD is placed, RN will notify [facility]ICRU[facility] to arrange transport. [facility] will follow.    SARAH Franco LMSW [facility]  [id number]    "

## 2023-01-25 ENCOUNTER — HOSPITAL ENCOUNTER (INPATIENT)
Facility: HOSPITAL | Age: 88
LOS: 5 days | Discharge: HOME HEALTH | DRG: 880 | End: 2023-01-30
Attending: EMERGENCY MEDICINE | Admitting: INTERNAL MEDICINE
Payer: MEDICARE

## 2023-01-25 ENCOUNTER — TELEPHONE (OUTPATIENT)
Dept: PHARMACY | Facility: CLINIC | Age: 88
End: 2023-01-25
Payer: MEDICARE

## 2023-01-25 DIAGNOSIS — R41.0 DELIRIUM: ICD-10-CM

## 2023-01-25 DIAGNOSIS — L03.115 CELLULITIS OF RIGHT LOWER EXTREMITY: Primary | ICD-10-CM

## 2023-01-25 DIAGNOSIS — N18.32 STAGE 3B CHRONIC KIDNEY DISEASE: ICD-10-CM

## 2023-01-25 DIAGNOSIS — E87.5 HYPERKALEMIA: ICD-10-CM

## 2023-01-25 DIAGNOSIS — R41.82 AMS (ALTERED MENTAL STATUS): ICD-10-CM

## 2023-01-25 DIAGNOSIS — R26.81 GAIT INSTABILITY: ICD-10-CM

## 2023-01-25 DIAGNOSIS — R53.81 DEBILITY: ICD-10-CM

## 2023-01-25 DIAGNOSIS — F05 DELIRIUM DUE TO MEDICAL CONDITION WITH BEHAVIORAL DISTURBANCE: ICD-10-CM

## 2023-01-25 DIAGNOSIS — R07.9 CHEST PAIN: ICD-10-CM

## 2023-01-25 LAB
ALBUMIN SERPL BCP-MCNC: 2.9 G/DL (ref 3.5–5.2)
ALLENS TEST: NORMAL
ALP SERPL-CCNC: 141 U/L (ref 55–135)
ALT SERPL W/O P-5'-P-CCNC: 12 U/L (ref 10–44)
ANION GAP SERPL CALC-SCNC: 10 MMOL/L (ref 8–16)
ANION GAP SERPL CALC-SCNC: 12 MMOL/L (ref 8–16)
AST SERPL-CCNC: 14 U/L (ref 10–40)
BASOPHILS # BLD AUTO: 0.02 K/UL (ref 0–0.2)
BASOPHILS NFR BLD: 0.3 % (ref 0–1.9)
BILIRUB SERPL-MCNC: 0.7 MG/DL (ref 0.1–1)
BNP SERPL-MCNC: 183 PG/ML (ref 0–99)
BUN SERPL-MCNC: 21 MG/DL (ref 8–23)
BUN SERPL-MCNC: 23 MG/DL (ref 8–23)
CALCIUM SERPL-MCNC: 8.2 MG/DL (ref 8.7–10.5)
CALCIUM SERPL-MCNC: 8.5 MG/DL (ref 8.7–10.5)
CHLORIDE SERPL-SCNC: 109 MMOL/L (ref 95–110)
CHLORIDE SERPL-SCNC: 109 MMOL/L (ref 95–110)
CK SERPL-CCNC: 54 U/L (ref 20–180)
CO2 SERPL-SCNC: 18 MMOL/L (ref 23–29)
CO2 SERPL-SCNC: 23 MMOL/L (ref 23–29)
CREAT SERPL-MCNC: 1.1 MG/DL (ref 0.5–1.4)
CREAT SERPL-MCNC: 1.1 MG/DL (ref 0.5–1.4)
CRP SERPL-MCNC: 48.4 MG/L (ref 0–8.2)
CTP QC/QA: YES
CTP QC/QA: YES
DIFFERENTIAL METHOD: ABNORMAL
EOSINOPHIL # BLD AUTO: 0 K/UL (ref 0–0.5)
EOSINOPHIL NFR BLD: 0.7 % (ref 0–8)
ERYTHROCYTE [DISTWIDTH] IN BLOOD BY AUTOMATED COUNT: 17.5 % (ref 11.5–14.5)
ERYTHROCYTE [SEDIMENTATION RATE] IN BLOOD BY WESTERGREN METHOD: 43 MM/HR (ref 0–20)
EST. GFR  (NO RACE VARIABLE): 48 ML/MIN/1.73 M^2
EST. GFR  (NO RACE VARIABLE): 48 ML/MIN/1.73 M^2
GLUCOSE SERPL-MCNC: 191 MG/DL (ref 70–110)
GLUCOSE SERPL-MCNC: 87 MG/DL (ref 70–110)
HCT VFR BLD AUTO: 27.5 % (ref 37–48.5)
HGB BLD-MCNC: 9.3 G/DL (ref 12–16)
IMM GRANULOCYTES # BLD AUTO: 0.09 K/UL (ref 0–0.04)
IMM GRANULOCYTES NFR BLD AUTO: 1.5 % (ref 0–0.5)
LACTATE SERPL-SCNC: 1.5 MMOL/L (ref 0.5–2.2)
LACTATE SERPL-SCNC: 1.7 MMOL/L (ref 0.5–2.2)
LDH SERPL L TO P-CCNC: 1.43 MMOL/L (ref 0.5–2.2)
LYMPHOCYTES # BLD AUTO: 0.6 K/UL (ref 1–4.8)
LYMPHOCYTES NFR BLD: 9.9 % (ref 18–48)
MAGNESIUM SERPL-MCNC: 1.9 MG/DL (ref 1.6–2.6)
MCH RBC QN AUTO: 29.3 PG (ref 27–31)
MCHC RBC AUTO-ENTMCNC: 33.8 G/DL (ref 32–36)
MCV RBC AUTO: 87 FL (ref 82–98)
MONOCYTES # BLD AUTO: 1.1 K/UL (ref 0.3–1)
MONOCYTES NFR BLD: 18.1 % (ref 4–15)
NEUTROPHILS # BLD AUTO: 4.3 K/UL (ref 1.8–7.7)
NEUTROPHILS NFR BLD: 69.5 % (ref 38–73)
NRBC BLD-RTO: 0 /100 WBC
PHOSPHATE SERPL-MCNC: 3.4 MG/DL (ref 2.7–4.5)
PLATELET # BLD AUTO: 221 K/UL (ref 150–450)
PMV BLD AUTO: 10.4 FL (ref 9.2–12.9)
POC MOLECULAR INFLUENZA A AGN: NEGATIVE
POC MOLECULAR INFLUENZA B AGN: NEGATIVE
POTASSIUM SERPL-SCNC: 5.3 MMOL/L (ref 3.5–5.1)
POTASSIUM SERPL-SCNC: 5.4 MMOL/L (ref 3.5–5.1)
PROCALCITONIN SERPL IA-MCNC: 0.11 NG/ML
PROT SERPL-MCNC: 6.7 G/DL (ref 6–8.4)
RBC # BLD AUTO: 3.17 M/UL (ref 4–5.4)
SAMPLE: NORMAL
SARS-COV-2 RDRP RESP QL NAA+PROBE: NEGATIVE
SITE: NORMAL
SODIUM SERPL-SCNC: 139 MMOL/L (ref 136–145)
SODIUM SERPL-SCNC: 142 MMOL/L (ref 136–145)
TROPONIN I SERPL DL<=0.01 NG/ML-MCNC: 0.01 NG/ML (ref 0–0.03)
TSH SERPL DL<=0.005 MIU/L-ACNC: 1.65 UIU/ML (ref 0.4–4)
WBC # BLD AUTO: 6.14 K/UL (ref 3.9–12.7)

## 2023-01-25 PROCEDURE — 93010 ELECTROCARDIOGRAM REPORT: CPT | Mod: HCNC,,, | Performed by: INTERNAL MEDICINE

## 2023-01-25 PROCEDURE — 87502 INFLUENZA DNA AMP PROBE: CPT | Mod: HCNC

## 2023-01-25 PROCEDURE — 11000001 HC ACUTE MED/SURG PRIVATE ROOM: Mod: HCNC

## 2023-01-25 PROCEDURE — 82550 ASSAY OF CK (CPK): CPT | Mod: HCNC | Performed by: INTERNAL MEDICINE

## 2023-01-25 PROCEDURE — 63600175 PHARM REV CODE 636 W HCPCS: Mod: HCNC | Performed by: EMERGENCY MEDICINE

## 2023-01-25 PROCEDURE — 96365 THER/PROPH/DIAG IV INF INIT: CPT | Mod: HCNC

## 2023-01-25 PROCEDURE — 93010 EKG 12-LEAD: ICD-10-PCS | Mod: HCNC,,, | Performed by: INTERNAL MEDICINE

## 2023-01-25 PROCEDURE — 25000003 PHARM REV CODE 250: Mod: HCNC | Performed by: INTERNAL MEDICINE

## 2023-01-25 PROCEDURE — 83605 ASSAY OF LACTIC ACID: CPT | Mod: HCNC

## 2023-01-25 PROCEDURE — 87040 BLOOD CULTURE FOR BACTERIA: CPT | Mod: 59,HCNC | Performed by: EMERGENCY MEDICINE

## 2023-01-25 PROCEDURE — S0166 INJ OLANZAPINE 2.5MG: HCPCS | Mod: HCNC | Performed by: INTERNAL MEDICINE

## 2023-01-25 PROCEDURE — 80048 BASIC METABOLIC PNL TOTAL CA: CPT | Mod: HCNC,XB | Performed by: INTERNAL MEDICINE

## 2023-01-25 PROCEDURE — 25000003 PHARM REV CODE 250: Mod: HCNC | Performed by: EMERGENCY MEDICINE

## 2023-01-25 PROCEDURE — 96375 TX/PRO/DX INJ NEW DRUG ADDON: CPT | Mod: HCNC

## 2023-01-25 PROCEDURE — 84145 PROCALCITONIN (PCT): CPT | Mod: HCNC | Performed by: EMERGENCY MEDICINE

## 2023-01-25 PROCEDURE — 84443 ASSAY THYROID STIM HORMONE: CPT | Mod: HCNC | Performed by: EMERGENCY MEDICINE

## 2023-01-25 PROCEDURE — 83735 ASSAY OF MAGNESIUM: CPT | Mod: HCNC | Performed by: EMERGENCY MEDICINE

## 2023-01-25 PROCEDURE — 96367 TX/PROPH/DG ADDL SEQ IV INF: CPT | Mod: HCNC

## 2023-01-25 PROCEDURE — 80053 COMPREHEN METABOLIC PANEL: CPT | Mod: HCNC | Performed by: EMERGENCY MEDICINE

## 2023-01-25 PROCEDURE — 36415 COLL VENOUS BLD VENIPUNCTURE: CPT | Mod: HCNC | Performed by: INTERNAL MEDICINE

## 2023-01-25 PROCEDURE — 85025 COMPLETE CBC W/AUTO DIFF WBC: CPT | Mod: HCNC | Performed by: EMERGENCY MEDICINE

## 2023-01-25 PROCEDURE — 96366 THER/PROPH/DIAG IV INF ADDON: CPT | Mod: HCNC

## 2023-01-25 PROCEDURE — 83605 ASSAY OF LACTIC ACID: CPT | Mod: HCNC | Performed by: EMERGENCY MEDICINE

## 2023-01-25 PROCEDURE — 99285 EMERGENCY DEPT VISIT HI MDM: CPT | Mod: 25,HCNC

## 2023-01-25 PROCEDURE — 84484 ASSAY OF TROPONIN QUANT: CPT | Mod: HCNC | Performed by: EMERGENCY MEDICINE

## 2023-01-25 PROCEDURE — 84100 ASSAY OF PHOSPHORUS: CPT | Mod: HCNC | Performed by: EMERGENCY MEDICINE

## 2023-01-25 PROCEDURE — 86140 C-REACTIVE PROTEIN: CPT | Mod: HCNC | Performed by: INTERNAL MEDICINE

## 2023-01-25 PROCEDURE — 85652 RBC SED RATE AUTOMATED: CPT | Mod: HCNC | Performed by: INTERNAL MEDICINE

## 2023-01-25 PROCEDURE — 87635 SARS-COV-2 COVID-19 AMP PRB: CPT | Mod: HCNC | Performed by: EMERGENCY MEDICINE

## 2023-01-25 PROCEDURE — 99900035 HC TECH TIME PER 15 MIN (STAT): Mod: HCNC

## 2023-01-25 PROCEDURE — 83880 ASSAY OF NATRIURETIC PEPTIDE: CPT | Mod: HCNC | Performed by: EMERGENCY MEDICINE

## 2023-01-25 RX ORDER — SILVER SULFADIAZINE 10 G/1000G
CREAM TOPICAL 2 TIMES DAILY
Status: DISCONTINUED | OUTPATIENT
Start: 2023-01-25 | End: 2023-01-26

## 2023-01-25 RX ORDER — ENOXAPARIN SODIUM 100 MG/ML
40 INJECTION SUBCUTANEOUS EVERY 24 HOURS
Status: DISCONTINUED | OUTPATIENT
Start: 2023-01-25 | End: 2023-01-30 | Stop reason: HOSPADM

## 2023-01-25 RX ORDER — NALOXONE HCL 0.4 MG/ML
0.02 VIAL (ML) INJECTION
Status: DISCONTINUED | OUTPATIENT
Start: 2023-01-25 | End: 2023-01-30 | Stop reason: HOSPADM

## 2023-01-25 RX ORDER — IPRATROPIUM BROMIDE AND ALBUTEROL SULFATE 2.5; .5 MG/3ML; MG/3ML
3 SOLUTION RESPIRATORY (INHALATION) EVERY 4 HOURS PRN
Status: DISCONTINUED | OUTPATIENT
Start: 2023-01-25 | End: 2023-01-30 | Stop reason: HOSPADM

## 2023-01-25 RX ORDER — PROCHLORPERAZINE EDISYLATE 5 MG/ML
5 INJECTION INTRAMUSCULAR; INTRAVENOUS EVERY 6 HOURS PRN
Status: DISCONTINUED | OUTPATIENT
Start: 2023-01-25 | End: 2023-01-28

## 2023-01-25 RX ORDER — AMOXICILLIN 250 MG
1 CAPSULE ORAL 2 TIMES DAILY PRN
Status: DISCONTINUED | OUTPATIENT
Start: 2023-01-25 | End: 2023-01-30 | Stop reason: HOSPADM

## 2023-01-25 RX ORDER — POLYETHYLENE GLYCOL 3350 17 G/17G
17 POWDER, FOR SOLUTION ORAL DAILY
Status: DISCONTINUED | OUTPATIENT
Start: 2023-01-26 | End: 2023-01-30 | Stop reason: HOSPADM

## 2023-01-25 RX ORDER — OLANZAPINE 10 MG/2ML
5 INJECTION, POWDER, FOR SOLUTION INTRAMUSCULAR ONCE
Status: COMPLETED | OUTPATIENT
Start: 2023-01-25 | End: 2023-01-25

## 2023-01-25 RX ORDER — IBUPROFEN 200 MG
16 TABLET ORAL
Status: DISCONTINUED | OUTPATIENT
Start: 2023-01-25 | End: 2023-01-30 | Stop reason: HOSPADM

## 2023-01-25 RX ORDER — SODIUM CHLORIDE 0.9 % (FLUSH) 0.9 %
10 SYRINGE (ML) INJECTION EVERY 12 HOURS PRN
Status: DISCONTINUED | OUTPATIENT
Start: 2023-01-25 | End: 2023-01-30 | Stop reason: HOSPADM

## 2023-01-25 RX ORDER — ONDANSETRON 2 MG/ML
4 INJECTION INTRAMUSCULAR; INTRAVENOUS EVERY 8 HOURS PRN
Status: DISCONTINUED | OUTPATIENT
Start: 2023-01-25 | End: 2023-01-30 | Stop reason: HOSPADM

## 2023-01-25 RX ORDER — ACETAMINOPHEN 325 MG/1
650 TABLET ORAL EVERY 4 HOURS PRN
Status: DISCONTINUED | OUTPATIENT
Start: 2023-01-25 | End: 2023-01-30 | Stop reason: HOSPADM

## 2023-01-25 RX ORDER — SIMETHICONE 80 MG
1 TABLET,CHEWABLE ORAL 4 TIMES DAILY PRN
Status: DISCONTINUED | OUTPATIENT
Start: 2023-01-25 | End: 2023-01-30 | Stop reason: HOSPADM

## 2023-01-25 RX ORDER — LORAZEPAM 2 MG/ML
1 INJECTION INTRAMUSCULAR
Status: COMPLETED | OUTPATIENT
Start: 2023-01-25 | End: 2023-01-25

## 2023-01-25 RX ORDER — PRAVASTATIN SODIUM 40 MG/1
40 TABLET ORAL DAILY
Status: DISCONTINUED | OUTPATIENT
Start: 2023-01-26 | End: 2023-01-30 | Stop reason: HOSPADM

## 2023-01-25 RX ORDER — MUPIROCIN 20 MG/G
OINTMENT TOPICAL 2 TIMES DAILY
Status: DISCONTINUED | OUTPATIENT
Start: 2023-01-25 | End: 2023-01-30 | Stop reason: HOSPADM

## 2023-01-25 RX ORDER — GLUCAGON 1 MG
1 KIT INJECTION
Status: DISCONTINUED | OUTPATIENT
Start: 2023-01-25 | End: 2023-01-30 | Stop reason: HOSPADM

## 2023-01-25 RX ORDER — ASPIRIN 81 MG/1
81 TABLET ORAL DAILY
Status: DISCONTINUED | OUTPATIENT
Start: 2023-01-26 | End: 2023-01-30 | Stop reason: HOSPADM

## 2023-01-25 RX ORDER — MICONAZOLE NITRATE 2 %
POWDER (GRAM) TOPICAL 2 TIMES DAILY
Status: DISCONTINUED | OUTPATIENT
Start: 2023-01-25 | End: 2023-01-30 | Stop reason: HOSPADM

## 2023-01-25 RX ORDER — IBUPROFEN 200 MG
24 TABLET ORAL
Status: DISCONTINUED | OUTPATIENT
Start: 2023-01-25 | End: 2023-01-30 | Stop reason: HOSPADM

## 2023-01-25 RX ORDER — MAG HYDROX/ALUMINUM HYD/SIMETH 200-200-20
30 SUSPENSION, ORAL (FINAL DOSE FORM) ORAL 4 TIMES DAILY PRN
Status: DISCONTINUED | OUTPATIENT
Start: 2023-01-25 | End: 2023-01-30 | Stop reason: HOSPADM

## 2023-01-25 RX ADMIN — SILVER SULFADIAZINE: 10 CREAM TOPICAL at 11:01

## 2023-01-25 RX ADMIN — OLANZAPINE 5 MG: 10 INJECTION, POWDER, FOR SOLUTION INTRAMUSCULAR at 09:01

## 2023-01-25 RX ADMIN — MICONAZOLE NITRATE: 20 POWDER TOPICAL at 11:01

## 2023-01-25 RX ADMIN — PIPERACILLIN AND TAZOBACTAM 4.5 G: 4; .5 INJECTION, POWDER, LYOPHILIZED, FOR SOLUTION INTRAVENOUS; PARENTERAL at 03:01

## 2023-01-25 RX ADMIN — OLANZAPINE 5 MG: 10 INJECTION, POWDER, FOR SOLUTION INTRAMUSCULAR at 10:01

## 2023-01-25 RX ADMIN — LORAZEPAM 1 MG: 2 INJECTION INTRAMUSCULAR; INTRAVENOUS at 05:01

## 2023-01-25 RX ADMIN — VANCOMYCIN HYDROCHLORIDE 2000 MG: 500 INJECTION, POWDER, LYOPHILIZED, FOR SOLUTION INTRAVENOUS at 04:01

## 2023-01-25 RX ADMIN — SODIUM CHLORIDE 1000 ML: 0.9 INJECTION, SOLUTION INTRAVENOUS at 03:01

## 2023-01-25 NOTE — Clinical Note
Diagnosis: AMS (altered mental status) [6949026]   Admitting Provider:: FAMILIA PEREZ [[id number]]   Future Attending Provider: NILE HERNÁNDEZ [[id number]]   Reason for IP Medical Treatment  (Clinical interventions that can only be accomplished in the IP setting? ) :: AMS, cellulitis   Estimated Length of Stay:: 2 midnights   I certify that Inpatient services for greater than or equal to 2 midnights are medically necessary:: Yes   Plans for Post-Acute care--if anticipated (pick the single best option):: A. No post acute care anticipated at this time   Dapsone Pregnancy And Lactation Text: This medication is Pregnancy Category C and is not considered safe during pregnancy or breast feeding.

## 2023-01-25 NOTE — ED PROVIDER NOTES
"Encounter Date: 2023    SCRIBE #1 NOTE: I, Vanessa Mota, am scribing for, and in the presence of,  Dang Manning MD.     History     Chief Complaint   Patient presents with    Altered Mental Status     Presents to the ED via EMS from [facility] after staff stated that patient was "abnormally aggressive". Patient AAOx3,disoriented to situation. Patient keeps stating that she wants to go home.      Jenniffer Jimenez is a 89 y.o. female, with a PMHx of Cellulitis of the RLE, A-fib, Diastolic heart failure (EF 55%), CKD, HTN, and HLD, who presents to the ED with altered mental status. History provided by chart and nursing home: Patient was discharged yesterday from [facility]. She was admitted for 20 days from  to  for cellulitis of the RLE complicated by delirium and agitation. She was sent today by [facility] who said the patient became abnormally agitated and aggressive. During exam, patient is saying that she wants to go home and is repeating random numbers/ addresses she wants to be taken to. She then pivoted to apologizing to the nursing staff repeadedly. Patient initially denies any pain but later states she has back pain. Takes 81 mg ASA, Zyprexa, Pravastatin. Patient was prescribed Silvadene and Micotin for RLE cellulitis. Per chart, patient is a former smoker. Chart review shows a PSHx of Appendectomy, Cardiac catheterization, , Hysterectomy, Oophorectomy, Sinus surgery, Strabismus surgery, Tonsillectomy, Watchman surgery, L knee replacement. Drug allergies to Opioids, Tizanidine, Tramadol, Beta-blockers, Morphine, and Ciprofloxacin.  Per papers from nursing home, patient is full code.    The history is provided by the patient. The history is limited by the condition of the patient. No  was used.   Review of patient's allergies indicates:   Allergen Reactions    Opioids - morphine analogues Other (See Comments)     Bowel issues; bowel obstruction " "   Tizanidine Other (See Comments)     "Lips were numb,  Almost passed out."    Tramadol Hallucinations    Beta-blockers (beta-adrenergic blocking agts) Other (See Comments)     Can not go on beta blockers for long period of time - due to taking allergy injections    Morphine     Opioids-meperidine and related     Ciprofloxacin Rash     Past Medical History:   Diagnosis Date    Amblyopia of left eye 04/10/2013    Anxiety     Arthritis     Facet arthropathy, Lumbosacral    Atherosclerosis of aorta     Cataract     Central retinal vein occlusion of left eye     CKD (chronic kidney disease) stage 3, GFR 30-59 ml/min     Depression     Diabetes mellitus, type 2     Diabetic polyneuropathy 2022    Essential (primary) hypertension     Exotropia of both eyes 2013    recession RSR 5.0mm w/ adj; recession LR os 5.0 w/ adj; resect MR os  4.0mm    Hearing loss     History of resection of small bowel     Hypertensive retinopathy of both eyes     Hypoglycemia     Macular degeneration     OA (osteoarthritis) of shoulder     Right    Osteoporosis     Paroxysmal atrial fibrillation     Posterior vitreous detachment of both eyes     Rhinitis     TIA (transient ischemic attack)      Past Surgical History:   Procedure Laterality Date    APPENDECTOMY      CARDIAC CATHETERIZATION      CATARACT EXTRACTION W/  INTRAOCULAR LENS IMPLANT Bilateral      SECTION, CLASSIC      CLOSURE OF LEFT ATRIAL APPENDAGE USING DEVICE N/A 2020    Procedure: Left atrial appendage closure device;  Surgeon: Abundio Curtis MD;  Location: [facility] CATH LAB;  Service: Cardiology;  Laterality: N/A;    HYSTERECTOMY      INNER EAR SURGERY      JOINT REPLACEMENT      LEFT KNEE REPLACEMENT IN  -    OOPHORECTOMY      SINUS SURGERY      STRABISMUS SURGERY  13    RSR recession 5 mm, LLR recession 5 mm and LMR resection 4mm    STRABISMUS SURGERY  2014    recess LR OD 6mm    TONSILLECTOMY      watchman surgery N/A 2020     Family " History   Problem Relation Age of Onset    Hypertension Mother     Hypertension Father     Liver disease Sister     Diabetes Sister     Heart disease Sister     Diabetes Brother     Breast cancer Maternal Aunt     No Known Problems Maternal Uncle     No Known Problems Paternal Aunt     No Known Problems Paternal Uncle     No Known Problems Maternal Grandmother     No Known Problems Maternal Grandfather     No Known Problems Paternal Grandmother     No Known Problems Paternal Grandfather     No Known Problems Daughter     No Known Problems Son     Heart disease Sister     No Known Problems Son     No Known Problems Son     Cancer Neg Hx         in first degree relatives    Melanoma Neg Hx     Psoriasis Neg Hx     Lupus Neg Hx     Amblyopia Neg Hx     Blindness Neg Hx     Cataracts Neg Hx     Glaucoma Neg Hx     Macular degeneration Neg Hx     Retinal detachment Neg Hx     Strabismus Neg Hx     Stroke Neg Hx     Thyroid disease Neg Hx      Social History     Tobacco Use    Smoking status: Former     Types: Cigarettes     Quit date: 10/29/1982     Years since quittin.2     Passive exposure: Never    Smokeless tobacco: Never   Substance Use Topics    Alcohol use: Yes     Alcohol/week: 2.0 standard drinks     Types: 2 Shots of liquor per week     Comment: rare    Drug use: No     Review of Systems   Unable to perform ROS: Mental status change   Musculoskeletal:  Positive for back pain.   Psychiatric/Behavioral:  Positive for agitation.         (+) AMS.     Physical Exam     Initial Vitals [23 1355]   BP Pulse Resp Temp SpO2   (!) 142/64 103 18 99.9 °F (37.7 °C) 96 %      MAP       --         Physical Exam    Nursing note and vitals reviewed.  Constitutional: She appears well-developed and well-nourished. She is not diaphoretic. No distress.   HENT:   Head: Normocephalic and atraumatic.   Mouth/Throat: Oropharynx is clear and moist. Mucous membranes are dry. No oropharyngeal exudate.   Eyes: Conjunctivae and  EOM are normal. Pupils are equal, round, and reactive to light. Right eye exhibits no discharge. Left eye exhibits no discharge.   Neck: Neck supple. No JVD present.   Normal range of motion.  Cardiovascular:  Normal rate, regular rhythm, normal heart sounds and intact distal pulses.     Exam reveals no gallop and no friction rub.       No murmur heard.  Pulmonary/Chest: Breath sounds normal. No respiratory distress. She has no wheezes. She has no rhonchi. She has no rales.   Slight wheezes.   Abdominal: Abdomen is soft. Bowel sounds are normal. She exhibits no distension. There is no abdominal tenderness. There is no rebound and no guarding.   Musculoskeletal:         General: No tenderness or edema.      Cervical back: Normal range of motion and neck supple.      Comments: Moving all extremities equally. No sacral decubitus.      Lymphadenopathy:     She has no cervical adenopathy.   Neurological: She is alert. She has normal strength. No cranial nerve deficit or sensory deficit.   Not oriented, delirious, confused. Tearful and labile on exam. Repetitive speech.    Skin: Skin is warm and dry. Capillary refill takes less than 2 seconds.   Excoriated wound with erythema to RLE. Rash to BLE. Excoriated wound to L wrist.   Psychiatric: She has a normal mood and affect. Thought content normal.         ED Course   Procedures  Labs Reviewed   CBC W/ AUTO DIFFERENTIAL - Abnormal; Notable for the following components:       Result Value    RBC 3.17 (*)     Hemoglobin 9.3 (*)     Hematocrit 27.5 (*)     RDW 17.5 (*)     Immature Granulocytes 1.5 (*)     Immature Grans (Abs) 0.09 (*)     Lymph # 0.6 (*)     Mono # 1.1 (*)     Lymph % 9.9 (*)     Mono % 18.1 (*)     All other components within normal limits   COMPREHENSIVE METABOLIC PANEL - Abnormal; Notable for the following components:    Potassium 5.4 (*)     Calcium 8.5 (*)     Albumin 2.9 (*)     Alkaline Phosphatase 141 (*)     eGFR 48 (*)     All other components  within normal limits   B-TYPE NATRIURETIC PEPTIDE - Abnormal; Notable for the following components:     (*)     All other components within normal limits   TSH   LACTIC ACID, PLASMA   TROPONIN I   PROCALCITONIN   MAGNESIUM   PHOSPHORUS   LACTIC ACID, PLASMA   URINALYSIS, REFLEX TO URINE CULTURE   POCT INFLUENZA A/B MOLECULAR   SARS-COV-2 RDRP GENE   ISTAT LACTATE          Imaging Results              CT Head Without Contrast (Final result)  Result time 01/25/23 17:59:59      Final result by Jensen Fierro MD (01/25/23 17:59:59)                   Impression:      No acute intracranial abnormalities identified, allowing for patient motion limitations.  No significant detrimental change compared to previous CT head from November 2022.      Electronically signed by: Jensen Fierro MD  Date:    01/25/2023  Time:    17:59               Narrative:    EXAMINATION:  CT HEAD WITHOUT CONTRAST    CLINICAL HISTORY:  Mental status change, unknown cause;    TECHNIQUE:  Low dose axial images were obtained through the head.  Coronal and sagittal reformations were also performed. Contrast was not administered.    COMPARISON:  CT head from November 2022.    FINDINGS:  Motion limited examination.  There is mild generalized cerebral volume loss and chronic microvascular ischemic change.  No evidence of acute/recent major vascular distribution cerebral infarction, intraparenchymal hemorrhage, or intra-axial space occupying lesion. The ventricular system is normal in size and configuration with no evidence of hydrocephalus. No effacement of the skull-base cisterns. No abnormal extra-axial fluid collections or blood products. Visualized paranasal sinuses and mastoid air cells are clear. The calvarium shows no acute abnormalities.  Postsurgical changes of partial left mastoidectomy are seen.                                       X-Ray Chest AP Portable (Final result)  Result time 01/25/23 16:44:08      Final result by Sunshine ACEVEDO  MD Doc (01/25/23 16:44:08)                   Impression:      As above      Electronically signed by: Sunshine Roach MD  Date:    01/25/2023  Time:    16:44               Narrative:    EXAMINATION:  XR CHEST AP PORTABLE    CLINICAL HISTORY:  Sepsis;    TECHNIQUE:  Single frontal view of the chest was performed.    COMPARISON:  January 5, 2023    FINDINGS:  Heart size is mildly enlarged but not significantly changed.  Calcification is present along the wall of the aorta.  There is osseous demineralization and degenerative change.  Increased interstitial markings appears similar to previous examination.  Left lung base is not well evaluated due to patient positioning.  It is difficult to exclude pleural fluid or parenchymal disease in this region.  Consider repeat imaging.                                       Medications   mupirocin 2 % ointment ( Nasal Given 1/26/23 0107)   silver sulfADIAZINE 1% cream ( Topical (Top) Given 1/25/23 2330)   miconazole NITRATE 2 % top powder ( Topical (Top) Given 1/25/23 2330)   aspirin EC tablet 81 mg (has no administration in time range)   pravastatin tablet 40 mg (has no administration in time range)   sodium chloride 0.9% flush 10 mL (has no administration in time range)   naloxone 0.4 mg/mL injection 0.02 mg (has no administration in time range)   glucose chewable tablet 16 g (has no administration in time range)   glucose chewable tablet 24 g (has no administration in time range)   dextrose 10% bolus 125 mL 125 mL (has no administration in time range)   dextrose 10% bolus 250 mL 250 mL (has no administration in time range)   glucagon (human recombinant) injection 1 mg (has no administration in time range)   enoxaparin injection 40 mg (40 mg Subcutaneous Given 1/26/23 0029)   acetaminophen tablet 650 mg (650 mg Oral Not Given 1/25/23 2132)   polyethylene glycol packet 17 g (has no administration in time range)   senna-docusate 8.6-50 mg per tablet 1 tablet (has no  "administration in time range)   ondansetron injection 4 mg (has no administration in time range)   prochlorperazine injection Soln 5 mg (has no administration in time range)   albuterol-ipratropium 2.5 mg-0.5 mg/3 mL nebulizer solution 3 mL (has no administration in time range)   aluminum-magnesium hydroxide-simethicone 200-200-20 mg/5 mL suspension 30 mL (has no administration in time range)   simethicone chewable tablet 80 mg (has no administration in time range)   sodium zirconium cyclosilicate packet 10 g (10 g Oral Given 1/26/23 0107)   piperacillin-tazobactam (ZOSYN) 4.5 g in dextrose 5 % in water (D5W) 5 % 100 mL IVPB (MB+) (0 g Intravenous Stopped 1/25/23 1618)   vancomycin (VANCOCIN) 2,000 mg in dextrose 5 % (D5W) 500 mL IVPB (0 mg Intravenous Stopped 1/25/23 2007)   sodium chloride 0.9% bolus 1,000 mL 1,000 mL (0 mLs Intravenous Stopped 1/25/23 1638)   LORazepam injection 1 mg (1 mg Intravenous Given 1/25/23 1739)   OLANZapine injection 5 mg (5 mg Intramuscular Given 1/25/23 2140)   OLANZapine injection 5 mg (5 mg Intramuscular Given 1/25/23 2256)   metoprolol injection 5 mg (5 mg Intravenous Given 1/26/23 0107)     Medical Decision Making:   History:   Old Medical Records: I decided to obtain old medical records.  Independently Interpreted Test(s):   I have ordered and independently interpreted EKG Reading(s) - see summary below       <> Summary of EKG Reading(s): EKGs independently interpreted by Dr Dang Manning MD reads: see ED course.   Clinical Tests:   Lab Tests: Ordered and Reviewed  Radiological Study: Ordered and Reviewed  Medical Tests: Ordered and Reviewed  Sepsis Perfusion Assessment: "I attest a sepsis perfusion exam was performed within 6 hours of sepsis, severe sepsis, or septic shock presentation, following fluid resuscitation."     MDM  Patient arrived via EMS with AMS. physical exam remarkable for confused and agitated and GCS 14 consistent with delirium. + significant cellulitis noted to " right lower leg and left arm.     Given patient has normal O2, hypoxia less likely.  BgL is ___, making hypoglycemia, DKA, HHS less likely    DDX still includes, but is not limited to: EtOH, electrolyte abnormality (low/high Na, low/high Mg, hypocalcemia, hypophosphatemia), abnormal thyroid function, infection/sepsis, drug overdose, hypothermia, uremia, trauma, encephalopathy/encephalitis, CVA, SAH or other ICH, postictal state 2/2 seizure, psych.    Labs: flu and covid negative. Lactic WNL, No leukocytosis. K of 5.4, similar to previous on [facility] per discharge instructions, no peaked T wave son EKG   Urine: urine pending, patient not able to give clean sample yet and too agitated for in-out cath. BsAbx were given upon arrival   ECG- afib RVR at 115, after IVF patient HR improved to 90s. No acute ST elevation.   CXR: no acute changes noted   Head CT: no acute changes     Given patient significant cellulitis (?fungal) and worsening delirium, agitation, requiring chemical sedation to obtain CT scan, discussed case with Casandra who agrees to admission to Dr. Sifuentes's service.        Scribe Attestation:   Scribe #1: I performed the above scribed service and the documentation accurately describes the services I performed. I attest to the accuracy of the note.      ED Course as of 01/26/23 0144   Wed Jan 25, 2023   1658 EKG 12-lead  Atrial fibrillation with rapid ventricular rate at 115. and PVC.  No significant ST elevation or depression.  T-wave flattening in V1 and 1.  Normal axis.  QTC is 426.  This EKG is slightly difficult to read secondary to patient tremor.  When compared to her previous EKGs she was previously in AFib although the rate is higher now. [JT]      ED Course User Index  [JT] Dang Manning MD               I, Dang Manning, personally performed the services described in this documentation.  All medical record entries made by the scribe were at my direction and in my presence.  I have reviewed the  chart and agree that the record reflects my personal performance and is accurate and complete.  Clinical Impression:   Final diagnoses:  [R41.82] AMS (altered mental status)  [R07.9] Chest pain  [R41.0] Delirium  [L03.115] Cellulitis of right lower extremity (Primary)  [E87.5] Hyperkalemia        ED Disposition Condition    Admit Stable                Dang Manning MD  01/26/23 0152

## 2023-01-26 PROBLEM — F05 DELIRIUM DUE TO MEDICAL CONDITION WITH BEHAVIORAL DISTURBANCE: Status: ACTIVE | Noted: 2023-01-25

## 2023-01-26 PROBLEM — Z86.39 HISTORY OF DIABETES MELLITUS: Status: ACTIVE | Noted: 2023-01-26

## 2023-01-26 PROBLEM — I27.20 PULMONARY HYPERTENSION: Status: ACTIVE | Noted: 2023-01-26

## 2023-01-26 LAB
ALBUMIN SERPL BCP-MCNC: 2.9 G/DL (ref 3.5–5.2)
ALP SERPL-CCNC: 138 U/L (ref 55–135)
ALT SERPL W/O P-5'-P-CCNC: 12 U/L (ref 10–44)
AMMONIA PLAS-SCNC: 24 UMOL/L (ref 10–50)
ANION GAP SERPL CALC-SCNC: 12 MMOL/L (ref 8–16)
AST SERPL-CCNC: 13 U/L (ref 10–40)
BACTERIA #/AREA URNS HPF: NORMAL /HPF
BASOPHILS # BLD AUTO: 0.02 K/UL (ref 0–0.2)
BASOPHILS NFR BLD: 0.3 % (ref 0–1.9)
BILIRUB SERPL-MCNC: 0.7 MG/DL (ref 0.1–1)
BILIRUB UR QL STRIP: NEGATIVE
BUN SERPL-MCNC: 19 MG/DL (ref 8–23)
CALCIUM SERPL-MCNC: 8.6 MG/DL (ref 8.7–10.5)
CHLORIDE SERPL-SCNC: 107 MMOL/L (ref 95–110)
CLARITY UR: CLEAR
CO2 SERPL-SCNC: 22 MMOL/L (ref 23–29)
COLOR UR: YELLOW
CREAT SERPL-MCNC: 1.1 MG/DL (ref 0.5–1.4)
DIFFERENTIAL METHOD: ABNORMAL
EOSINOPHIL # BLD AUTO: 0 K/UL (ref 0–0.5)
EOSINOPHIL NFR BLD: 0.2 % (ref 0–8)
ERYTHROCYTE [DISTWIDTH] IN BLOOD BY AUTOMATED COUNT: 17.2 % (ref 11.5–14.5)
EST. GFR  (NO RACE VARIABLE): 48 ML/MIN/1.73 M^2
ESTIMATED AVG GLUCOSE: 126 MG/DL (ref 68–131)
FOLATE SERPL-MCNC: 12.7 NG/ML (ref 4–24)
GLUCOSE SERPL-MCNC: 131 MG/DL (ref 70–110)
GLUCOSE UR QL STRIP: NEGATIVE
HBA1C MFR BLD: 6 % (ref 4–5.6)
HCT VFR BLD AUTO: 25.9 % (ref 37–48.5)
HGB BLD-MCNC: 8.4 G/DL (ref 12–16)
HGB UR QL STRIP: NEGATIVE
HYALINE CASTS #/AREA URNS LPF: 0 /LPF
IMM GRANULOCYTES # BLD AUTO: 0.08 K/UL (ref 0–0.04)
IMM GRANULOCYTES NFR BLD AUTO: 1.3 % (ref 0–0.5)
IRON SERPL-MCNC: 20 UG/DL (ref 30–160)
KETONES UR QL STRIP: NEGATIVE
LEUKOCYTE ESTERASE UR QL STRIP: NEGATIVE
LYMPHOCYTES # BLD AUTO: 0.7 K/UL (ref 1–4.8)
LYMPHOCYTES NFR BLD: 11.2 % (ref 18–48)
MAGNESIUM SERPL-MCNC: 1.9 MG/DL (ref 1.6–2.6)
MCH RBC QN AUTO: 28.9 PG (ref 27–31)
MCHC RBC AUTO-ENTMCNC: 32.4 G/DL (ref 32–36)
MCV RBC AUTO: 89 FL (ref 82–98)
MICROSCOPIC COMMENT: NORMAL
MONOCYTES # BLD AUTO: 1.4 K/UL (ref 0.3–1)
MONOCYTES NFR BLD: 21.7 % (ref 4–15)
NEUTROPHILS # BLD AUTO: 4.2 K/UL (ref 1.8–7.7)
NEUTROPHILS NFR BLD: 65.3 % (ref 38–73)
NITRITE UR QL STRIP: NEGATIVE
NRBC BLD-RTO: 0 /100 WBC
PH UR STRIP: 7 [PH] (ref 5–8)
PHOSPHATE SERPL-MCNC: 3.4 MG/DL (ref 2.7–4.5)
PLATELET # BLD AUTO: 203 K/UL (ref 150–450)
PMV BLD AUTO: 11 FL (ref 9.2–12.9)
POTASSIUM SERPL-SCNC: 4.5 MMOL/L (ref 3.5–5.1)
PROT SERPL-MCNC: 6.5 G/DL (ref 6–8.4)
PROT UR QL STRIP: ABNORMAL
RBC # BLD AUTO: 2.91 M/UL (ref 4–5.4)
RBC #/AREA URNS HPF: 3 /HPF (ref 0–4)
SATURATED IRON: 5 % (ref 20–50)
SODIUM SERPL-SCNC: 141 MMOL/L (ref 136–145)
SP GR UR STRIP: 1.02 (ref 1–1.03)
TOTAL IRON BINDING CAPACITY: 367 UG/DL (ref 250–450)
TRANSFERRIN SERPL-MCNC: 248 MG/DL (ref 200–375)
URN SPEC COLLECT METH UR: ABNORMAL
UROBILINOGEN UR STRIP-ACNC: NEGATIVE EU/DL
VIT B12 SERPL-MCNC: 509 PG/ML (ref 210–950)
WBC # BLD AUTO: 6.36 K/UL (ref 3.9–12.7)
WBC #/AREA URNS HPF: 0 /HPF (ref 0–5)

## 2023-01-26 PROCEDURE — 51798 US URINE CAPACITY MEASURE: CPT | Mod: HCNC

## 2023-01-26 PROCEDURE — 84425 ASSAY OF VITAMIN B-1: CPT | Mod: HCNC | Performed by: INTERNAL MEDICINE

## 2023-01-26 PROCEDURE — 25000003 PHARM REV CODE 250: Mod: HCNC | Performed by: STUDENT IN AN ORGANIZED HEALTH CARE EDUCATION/TRAINING PROGRAM

## 2023-01-26 PROCEDURE — 97162 PT EVAL MOD COMPLEX 30 MIN: CPT | Mod: HCNC

## 2023-01-26 PROCEDURE — 93005 ELECTROCARDIOGRAM TRACING: CPT | Mod: HCNC

## 2023-01-26 PROCEDURE — 97166 OT EVAL MOD COMPLEX 45 MIN: CPT | Mod: HCNC

## 2023-01-26 PROCEDURE — 92610 EVALUATE SWALLOWING FUNCTION: CPT | Mod: HCNC

## 2023-01-26 PROCEDURE — 11000001 HC ACUTE MED/SURG PRIVATE ROOM: Mod: HCNC

## 2023-01-26 PROCEDURE — 86592 SYPHILIS TEST NON-TREP QUAL: CPT | Mod: HCNC | Performed by: INTERNAL MEDICINE

## 2023-01-26 PROCEDURE — 25000003 PHARM REV CODE 250: Mod: HCNC | Performed by: INTERNAL MEDICINE

## 2023-01-26 PROCEDURE — 97530 THERAPEUTIC ACTIVITIES: CPT | Mod: HCNC

## 2023-01-26 PROCEDURE — 90792 PR PSYCHIATRIC DIAGNOSTIC EVALUATION W/MEDICAL SERVICES: ICD-10-PCS | Mod: HCNC,,, | Performed by: PSYCHIATRY & NEUROLOGY

## 2023-01-26 PROCEDURE — 90792 PSYCH DIAG EVAL W/MED SRVCS: CPT | Mod: HCNC,,, | Performed by: PSYCHIATRY & NEUROLOGY

## 2023-01-26 PROCEDURE — 51701 INSERT BLADDER CATHETER: CPT | Mod: HCNC

## 2023-01-26 PROCEDURE — 83921 ORGANIC ACID SINGLE QUANT: CPT | Mod: HCNC | Performed by: INTERNAL MEDICINE

## 2023-01-26 PROCEDURE — 93010 ELECTROCARDIOGRAM REPORT: CPT | Mod: HCNC,,, | Performed by: INTERNAL MEDICINE

## 2023-01-26 PROCEDURE — 83036 HEMOGLOBIN GLYCOSYLATED A1C: CPT | Mod: HCNC | Performed by: INTERNAL MEDICINE

## 2023-01-26 PROCEDURE — 81000 URINALYSIS NONAUTO W/SCOPE: CPT | Mod: HCNC | Performed by: INTERNAL MEDICINE

## 2023-01-26 PROCEDURE — 82746 ASSAY OF FOLIC ACID SERUM: CPT | Mod: HCNC | Performed by: INTERNAL MEDICINE

## 2023-01-26 PROCEDURE — 80053 COMPREHEN METABOLIC PANEL: CPT | Mod: HCNC | Performed by: INTERNAL MEDICINE

## 2023-01-26 PROCEDURE — 51702 INSERT TEMP BLADDER CATH: CPT | Mod: HCNC

## 2023-01-26 PROCEDURE — 97110 THERAPEUTIC EXERCISES: CPT | Mod: HCNC

## 2023-01-26 PROCEDURE — 93010 EKG 12-LEAD: ICD-10-PCS | Mod: HCNC,,, | Performed by: INTERNAL MEDICINE

## 2023-01-26 PROCEDURE — 82140 ASSAY OF AMMONIA: CPT | Mod: HCNC | Performed by: INTERNAL MEDICINE

## 2023-01-26 PROCEDURE — 84466 ASSAY OF TRANSFERRIN: CPT | Mod: HCNC | Performed by: INTERNAL MEDICINE

## 2023-01-26 PROCEDURE — 84100 ASSAY OF PHOSPHORUS: CPT | Mod: HCNC | Performed by: INTERNAL MEDICINE

## 2023-01-26 PROCEDURE — 85025 COMPLETE CBC W/AUTO DIFF WBC: CPT | Mod: HCNC | Performed by: INTERNAL MEDICINE

## 2023-01-26 PROCEDURE — 83735 ASSAY OF MAGNESIUM: CPT | Mod: HCNC | Performed by: INTERNAL MEDICINE

## 2023-01-26 PROCEDURE — 82607 VITAMIN B-12: CPT | Mod: HCNC | Performed by: INTERNAL MEDICINE

## 2023-01-26 PROCEDURE — 63600175 PHARM REV CODE 636 W HCPCS: Mod: HCNC | Performed by: INTERNAL MEDICINE

## 2023-01-26 RX ORDER — METOPROLOL TARTRATE 1 MG/ML
5 INJECTION, SOLUTION INTRAVENOUS ONCE
Status: COMPLETED | OUTPATIENT
Start: 2023-01-26 | End: 2023-01-26

## 2023-01-26 RX ORDER — TRIAMCINOLONE ACETONIDE 1 MG/G
CREAM TOPICAL 2 TIMES DAILY
Status: DISCONTINUED | OUTPATIENT
Start: 2023-01-26 | End: 2023-01-30 | Stop reason: HOSPADM

## 2023-01-26 RX ADMIN — SODIUM ZIRCONIUM CYCLOSILICATE 10 G: 10 POWDER, FOR SUSPENSION ORAL at 01:01

## 2023-01-26 RX ADMIN — ASPIRIN 81 MG: 81 TABLET, COATED ORAL at 09:01

## 2023-01-26 RX ADMIN — MUPIROCIN: 20 OINTMENT TOPICAL at 09:01

## 2023-01-26 RX ADMIN — PRAVASTATIN SODIUM 40 MG: 40 TABLET ORAL at 09:01

## 2023-01-26 RX ADMIN — METOPROLOL TARTRATE 5 MG: 1 INJECTION, SOLUTION INTRAVENOUS at 01:01

## 2023-01-26 RX ADMIN — TRIAMCINOLONE ACETONIDE: 1 CREAM TOPICAL at 09:01

## 2023-01-26 RX ADMIN — SODIUM ZIRCONIUM CYCLOSILICATE 10 G: 10 POWDER, FOR SUSPENSION ORAL at 09:01

## 2023-01-26 RX ADMIN — POLYETHYLENE GLYCOL 3350 17 G: 17 POWDER, FOR SOLUTION ORAL at 09:01

## 2023-01-26 RX ADMIN — ENOXAPARIN SODIUM 40 MG: 40 INJECTION SUBCUTANEOUS at 12:01

## 2023-01-26 RX ADMIN — MUPIROCIN: 20 OINTMENT TOPICAL at 01:01

## 2023-01-26 RX ADMIN — ENOXAPARIN SODIUM 40 MG: 40 INJECTION SUBCUTANEOUS at 04:01

## 2023-01-26 RX ADMIN — MICONAZOLE NITRATE: 20 POWDER TOPICAL at 10:01

## 2023-01-26 RX ADMIN — SILVER SULFADIAZINE: 10 CREAM TOPICAL at 10:01

## 2023-01-26 NOTE — SUBJECTIVE & OBJECTIVE
"Past Medical History:   Diagnosis Date    Amblyopia of left eye 04/10/2013    Anxiety     Arthritis     Facet arthropathy, Lumbosacral    Atherosclerosis of aorta     Cataract     Central retinal vein occlusion of left eye     CKD (chronic kidney disease) stage 3, GFR 30-59 ml/min     Depression     Diabetes mellitus, type 2     Diabetic polyneuropathy 2022    Essential (primary) hypertension     Exotropia of both eyes 2013    recession RSR 5.0mm w/ adj; recession LR os 5.0 w/ adj; resect MR os  4.0mm    Hearing loss     History of resection of small bowel     Hypertensive retinopathy of both eyes     Hypoglycemia     Macular degeneration     OA (osteoarthritis) of shoulder     Right    Osteoporosis     Posterior vitreous detachment of both eyes     Rhinitis     TIA (transient ischemic attack)        Past Surgical History:   Procedure Laterality Date    APPENDECTOMY      CARDIAC CATHETERIZATION      CATARACT EXTRACTION W/  INTRAOCULAR LENS IMPLANT Bilateral      SECTION, CLASSIC      CLOSURE OF LEFT ATRIAL APPENDAGE USING DEVICE N/A 2020    Procedure: Left atrial appendage closure device;  Surgeon: Abundio Curtis MD;  Location: [facility] CATH LAB;  Service: Cardiology;  Laterality: N/A;    HYSTERECTOMY      INNER EAR SURGERY      JOINT REPLACEMENT      LEFT KNEE REPLACEMENT IN  -    OOPHORECTOMY      SINUS SURGERY      STRABISMUS SURGERY  13    RSR recession 5 mm, LLR recession 5 mm and LMR resection 4mm    STRABISMUS SURGERY  2014    recess LR OD 6mm    TONSILLECTOMY      watchman surgery N/A 2020       Review of patient's allergies indicates:   Allergen Reactions    Opioids - morphine analogues Other (See Comments)     Bowel issues; bowel obstruction    Tizanidine Other (See Comments)     "Lips were numb,  Almost passed out."    Tramadol Hallucinations    Beta-blockers (beta-adrenergic blocking agts) Other (See Comments)     Can not go on beta blockers for long period of " time - due to taking allergy injections    Morphine     Opioids-meperidine and related     Ciprofloxacin Rash       No current facility-administered medications on file prior to encounter.     Current Outpatient Medications on File Prior to Encounter   Medication Sig    acetaminophen (TYLENOL) 500 MG tablet Take 1,000 mg by mouth 2 (two) times a day.    aspirin (ECOTRIN) 81 MG EC tablet Take 1 tablet (81 mg total) by mouth once daily.    azelastine (ASTELIN) 137 mcg (0.1 %) nasal spray 1 spray (137 mcg total) by Nasal route 2 (two) times daily. (Patient not taking: Reported on 1/21/2023)    diclofenac sodium (VOLTAREN) 1 % Gel Apply 2 g topically 4 (four) times daily. Use gloves to apply to right hip for 10 days    famotidine (PEPCID) 20 MG tablet Take 1 tablet (20 mg total) by mouth 2 (two) times daily. For Allergic Reaction.    fluticasone propionate (FLONASE) 50 mcg/actuation nasal spray USE 1 SPRAY IN EACH NOSTRIL ONE TIME DAILY (Patient not taking: Reported on 1/21/2023)    guaiFENesin 100 mg/5 ml (ROBITUSSIN) 100 mg/5 mL syrup Take 15 mLs by mouth once daily.    levocetirizine (XYZAL) 5 MG tablet Take 1 tablet (5 mg total) by mouth once daily. For Allergies. (Patient not taking: Reported on 1/21/2023)    LIDOcaine (LIDODERM) 5 % Place 1 patch onto the skin once daily. Remove & Discard patch within 12 hours or as directed by provider. Apply to right hip.    miconazole NITRATE 2 % (MICOTIN) 2 % top powder Apply topically 2 (two) times daily. To RLE.    OLANZapine (ZYPREXA) 2.5 MG tablet Take 1 tablet (2.5 mg total) by mouth every 8 (eight) hours as needed (non-redirectable agitation).    OLANZapine (ZYPREXA) 2.5 MG tablet Take 1 tablet (2.5 mg total) by mouth every evening.    pravastatin (PRAVACHOL) 40 MG tablet Take 1 tablet (40 mg total) by mouth once daily.    silver sulfADIAZINE 1% (SILVADENE) 1 % cream Apply to RLE skin breakdown twice daily    sodium zirconium cyclosilicate (LOKELMA) 5 gram packet Take 1  packet (5 g total) by mouth 2 (two) times a day. Mix entire contents of packet(s) into drinking glass containing 3 tablespoons of water; stir well and drink immediately. Add water and repeat until no powder remains to receive entire dose.    triamcinolone acetonide 0.1% (KENALOG) 0.1 % ointment Apply to entire body twice daily    vit C/E/Zn/coppr/lutein/zeaxan (OCUVITE LUTEIN AND ZEAXANTHIN ORAL) Take 1 capsule by mouth once daily. Lutien 25 mg, Zeaxanthin 5 mg    vitamin E 400 UNIT capsule Take 400 Units by mouth once daily.     Family History       Problem Relation (Age of Onset)    Breast cancer Maternal Aunt    Diabetes Sister, Brother    Heart disease Sister, Sister    Hypertension Mother, Father    Liver disease Sister    No Known Problems Maternal Uncle, Paternal Aunt, Paternal Uncle, Maternal Grandmother, Maternal Grandfather, Paternal Grandmother, Paternal Grandfather, Daughter, Son, Son, Son          Tobacco Use    Smoking status: Former     Types: Cigarettes     Quit date: 10/29/1982     Years since quittin.2     Passive exposure: Never    Smokeless tobacco: Never   Substance and Sexual Activity    Alcohol use: Yes     Alcohol/week: 2.0 standard drinks     Types: 2 Shots of liquor per week     Comment: rare    Drug use: No    Sexual activity: Never     Review of Systems   Unable to perform ROS: Mental status change   Objective:     Vital Signs (Most Recent):  Temp: 99.9 °F (37.7 °C) (23 1355)  Pulse: 102 (23 1819)  Resp: 20 (23 1849)  BP: (!) 144/83 (23 1819)  SpO2: 95 % (23 1822)   Vital Signs (24h Range):  Temp:  [99.9 °F (37.7 °C)] 99.9 °F (37.7 °C)  Pulse:  [102-103] 102  Resp:  [18-50] 20  SpO2:  [95 %-96 %] 95 %  BP: (142-144)/(64-83) 144/83     Weight: 76.7 kg (169 lb)  Body mass index is 28.12 kg/m².    Physical Exam  Vitals and nursing note reviewed.   Constitutional:       General: She is not in acute distress.     Appearance: She is well-developed. She is not  diaphoretic.   HENT:      Head: Normocephalic and atraumatic.      Mouth/Throat:      Pharynx: No oropharyngeal exudate.   Eyes:      General: No scleral icterus.        Right eye: No discharge.         Left eye: No discharge.      Extraocular Movements: EOM normal.      Conjunctiva/sclera: Conjunctivae normal.      Pupils: Pupils are equal, round, and reactive to light.   Neck:      Thyroid: No thyromegaly.      Vascular: No JVD.      Trachea: No tracheal deviation.   Cardiovascular:      Rate and Rhythm: Normal rate and regular rhythm.      Heart sounds: Normal heart sounds. No murmur heard.    No friction rub. No gallop.   Pulmonary:      Effort: Pulmonary effort is normal. No respiratory distress.      Breath sounds: Normal breath sounds. No stridor. No wheezing or rales.   Chest:      Chest wall: No tenderness.   Abdominal:      General: Bowel sounds are normal. There is no distension.      Palpations: Abdomen is soft. There is no mass.      Tenderness: There is no abdominal tenderness. There is no guarding or rebound.   Musculoskeletal:         General: No tenderness. Normal range of motion.      Cervical back: Normal range of motion and neck supple.   Lymphadenopathy:      Cervical: No cervical adenopathy.   Skin:     General: Skin is warm and dry.      Coloration: Skin is not pale.      Findings: No erythema or rash.      Comments: Diffuse, healing, maculo-papular rash to extremities and trunk.  Skin maceration and    Neurological:      Mental Status: She is alert. She is disoriented and confused.      GCS: GCS eye subscore is 4. GCS verbal subscore is 5. GCS motor subscore is 6.      Comments: Alert to self only   Psychiatric:         Attention and Perception: She does not perceive auditory or visual hallucinations.         Mood and Affect: Affect is labile.         Speech: Speech is rapid and pressured and tangential.         Behavior: Behavior is agitated, aggressive, hyperactive and combative.          Cognition and Memory: Cognition is impaired. Memory is impaired. She exhibits impaired recent memory and impaired remote memory.         Judgment: Judgment is impulsive.         CRANIAL NERVES     CN III, IV, VI   Pupils are equal, round, and reactive to light.  Extraocular motions are normal.      Significant Labs: All pertinent labs within the past 24 hours have been reviewed.  Recent Results (from the past 24 hour(s))   TSH    Collection Time: 01/25/23  3:32 PM   Result Value Ref Range    TSH 1.647 0.400 - 4.000 uIU/mL   CBC auto differential    Collection Time: 01/25/23  3:32 PM   Result Value Ref Range    WBC 6.14 3.90 - 12.70 K/uL    RBC 3.17 (L) 4.00 - 5.40 M/uL    Hemoglobin 9.3 (L) 12.0 - 16.0 g/dL    Hematocrit 27.5 (L) 37.0 - 48.5 %    MCV 87 82 - 98 fL    MCH 29.3 27.0 - 31.0 pg    MCHC 33.8 32.0 - 36.0 g/dL    RDW 17.5 (H) 11.5 - 14.5 %    Platelets 221 150 - 450 K/uL    MPV 10.4 9.2 - 12.9 fL    Immature Granulocytes 1.5 (H) 0.0 - 0.5 %    Gran # (ANC) 4.3 1.8 - 7.7 K/uL    Immature Grans (Abs) 0.09 (H) 0.00 - 0.04 K/uL    Lymph # 0.6 (L) 1.0 - 4.8 K/uL    Mono # 1.1 (H) 0.3 - 1.0 K/uL    Eos # 0.0 0.0 - 0.5 K/uL    Baso # 0.02 0.00 - 0.20 K/uL    nRBC 0 0 /100 WBC    Gran % 69.5 38.0 - 73.0 %    Lymph % 9.9 (L) 18.0 - 48.0 %    Mono % 18.1 (H) 4.0 - 15.0 %    Eosinophil % 0.7 0.0 - 8.0 %    Basophil % 0.3 0.0 - 1.9 %    Differential Method Automated    Comprehensive metabolic panel    Collection Time: 01/25/23  3:32 PM   Result Value Ref Range    Sodium 142 136 - 145 mmol/L    Potassium 5.4 (H) 3.5 - 5.1 mmol/L    Chloride 109 95 - 110 mmol/L    CO2 23 23 - 29 mmol/L    Glucose 87 70 - 110 mg/dL    BUN 23 8 - 23 mg/dL    Creatinine 1.1 0.5 - 1.4 mg/dL    Calcium 8.5 (L) 8.7 - 10.5 mg/dL    Total Protein 6.7 6.0 - 8.4 g/dL    Albumin 2.9 (L) 3.5 - 5.2 g/dL    Total Bilirubin 0.7 0.1 - 1.0 mg/dL    Alkaline Phosphatase 141 (H) 55 - 135 U/L    AST 14 10 - 40 U/L    ALT 12 10 - 44 U/L    Anion Gap 10 8 -  16 mmol/L    eGFR 48 (A) >60 mL/min/1.73 m^2   Lactic acid, plasma #1    Collection Time: 01/25/23  3:32 PM   Result Value Ref Range    Lactate (Lactic Acid) 1.5 0.5 - 2.2 mmol/L   Brain natriuretic peptide    Collection Time: 01/25/23  3:32 PM   Result Value Ref Range     (H) 0 - 99 pg/mL   Troponin I    Collection Time: 01/25/23  3:32 PM   Result Value Ref Range    Troponin I 0.006 0.000 - 0.026 ng/mL   Procalcitonin    Collection Time: 01/25/23  3:32 PM   Result Value Ref Range    Procalcitonin 0.11 <0.25 ng/mL   Magnesium    Collection Time: 01/25/23  3:32 PM   Result Value Ref Range    Magnesium 1.9 1.6 - 2.6 mg/dL   Phosphorus    Collection Time: 01/25/23  3:32 PM   Result Value Ref Range    Phosphorus 3.4 2.7 - 4.5 mg/dL   ISTAT Lactate    Collection Time: 01/25/23  3:33 PM   Result Value Ref Range    POC Lactate 1.43 0.5 - 2.2 mmol/L    Sample VENOUS     Site Other     Allens Test N/A    POCT COVID-19 Rapid Screening    Collection Time: 01/25/23  6:29 PM   Result Value Ref Range    POC Rapid COVID Negative Negative     Acceptable Yes    POCT Influenza A/B Molecular    Collection Time: 01/25/23  6:29 PM   Result Value Ref Range    POC Molecular Influenza A Ag Negative Negative, Not Reported    POC Molecular Influenza B Ag Negative Negative, Not Reported     Acceptable Yes    Lactic acid, plasma #2    Collection Time: 01/25/23  7:54 PM   Result Value Ref Range    Lactate (Lactic Acid) 1.7 0.5 - 2.2 mmol/L         Significant Imaging: I have reviewed all pertinent imaging results/findings within the past 24 hours.

## 2023-01-26 NOTE — ASSESSMENT & PLAN NOTE
Stable to monitor for now  Check B12, SMMA, folate, Fe studies     Latest Reference Range & Units 01/22/23 11:30 01/23/23 03:29 01/24/23 08:15 01/25/23 15:32   Hemoglobin 12.0 - 16.0 g/dL 9.0 (L) 8.0 (L) 9.4 (L) 9.3 (L)

## 2023-01-26 NOTE — ASSESSMENT & PLAN NOTE
"US 1/5/23 showed no evidence of deep venous thrombosis in either lower extremity  She has no fever or leukocytosis, and a normal procalcitonin  Agree with holding antibiotics for now  Consult Vascular to weigh in on venous stasis ulceration possibility   -This does not have a punched out deep, pyoderma gangrenosum appearance    ID saw her just recently at [facility].  They noted the following recommendations"  "ID previously followed and ID staff cf inflammatory component or pyoderma.  Derm defers biopsy.  Skin Cx on admit shows MRSA and patient completed 9d of vanc and doxycycline.  Suspect colonization as no active signs of infection (heat or significant ttp).  Drug rash suspected from Cipro and is now improving off abx with steroid treatment.  Has superimposed candida dermatitis under pannus and in inguinal areas.  Has been on po fluconazole.  No systemic sign of infection.  Suspect underlying chronic venous stasis and venous side insufficiency.   Plan:   As per prior recs, would monitor off of abx at this time. Complete steroids as palnned    Stop fluconazole given recent drug rash and start antifungal powder to affected areas   Continue local wound care to RLE and judicious leg elevation    Continue steroid cream for rash   Rec vasc Sx fu for eval of venous insufficiency in RLE"    "

## 2023-01-26 NOTE — H&P
"[facility] Medicine  History & Physical    Patient Name: Jenniffer Jimenez  MRN: [id number]  Patient Class: IP- Inpatient  Admission Date: 1/25/2023  Attending Physician: Shilo Sifuentes MD   Primary Care Provider: Rubi Young DO         Patient information was obtained from relative(s), past medical records and ER records.     Subjective:     Principal Problem:Delirium due to medical condition with behavioral disturbance    Chief Complaint:   Chief Complaint   Patient presents with    Altered Mental Status     Presents to the ED via EMS from [facility] after staff stated that patient was "abnormally aggressive". Patient AAOx3,disoriented to situation. Patient keeps stating that she wants to go home.         HPI:   Ms. Jimenez is a 89yo lady with a past medical history of anxiety, depression, SB resection, AF s/p watchman, HTN, DM2, TIA, and chronic cellulitis/ wound of her right lower extremity    [place] [facility] [place] - yesterday, 1/24/23 for, "management of worsening RLE cellulitis. Pt received IV vancomycin and ciprofloxacin initially, vancomycin discontinued. Derm consulted for worsening rash, ciprofloxacin discontinued for suspected drug reaction, and the pt was started on doxycycline. Psych consulted for worsening delirium and agitation and pt returned to mental baseline. ID consulted for worsening cellulitis. ID recommends stopping abx and treating with antifungals and steroids. JOSHUA improving s/p IVF, Cr back to baseline. Discontinued lasix and spironolactone, continue lokelma outpatient. Patient medically stable and ready for discharge to SNF. [facility] accepting."    She now returns from skilled unit due to agitated delirium.  As noted, this is not a new problem for her, as she had severe episodes recently at [facility].  She is unable to provide any meaningful history to me, as she it tangential, agitated, confused and " "occasionally combative.  I had a long talk with her daughter at the bedside as well.      In the ED her VS were BP (!) 144/83   Pulse 102   Temp 99.9 °F (37.7 °C) (Oral)   Resp 20   Ht 5' 5" (1.651 m)   Wt 76.7 kg (169 lb)   LMP  (LMP Unknown)   SpO2 95%   BMI 28.12 kg/m².  Labs showed WBC 6, Hg 9.3, K 5.4 (hemolyzed), Cr 1.1, Alb 2.9, , COVID/FLU NEG.    CXR showed the heart size is mildly enlarged but not significantly changed.  Calcification is present along the wall of the aorta.  There is osseous demineralization and degenerative change.  Increased interstitial markings appears similar to previous examination.  Left lung base is not well evaluated due to patient positioning.  It is difficult to exclude pleural fluid or parenchymal disease in this region.  NC CT head showed no acute intracranial abnormalities identified, allowing for patient motion limitations.  No significant detrimental change compared to previous CT head from November 2022.  EKG showed Afib RVR, rate 115, PVCs, nonspecific ST-T changes, QTc 426.     In the ED she was treated with:  Medications   piperacillin-tazobactam (ZOSYN) 4.5 g in dextrose 5 % in water (D5W) 5 % 100 mL IVPB (MB+) (0 g Intravenous Stopped 1/25/23 1618)   vancomycin (VANCOCIN) 2,000 mg in dextrose 5 % (D5W) 500 mL IVPB (0 mg Intravenous Stopped 1/25/23 2007)   sodium chloride 0.9% bolus 1,000 mL 1,000 mL (0 mLs Intravenous Stopped 1/25/23 1638)   LORazepam injection 1 mg (1 mg Intravenous Given 1/25/23 1739)               Past Medical History:   Diagnosis Date    Amblyopia of left eye 04/10/2013    Anxiety     Arthritis     Facet arthropathy, Lumbosacral    Atherosclerosis of aorta     Cataract     Central retinal vein occlusion of left eye     CKD (chronic kidney disease) stage 3, GFR 30-59 ml/min     Depression     Diabetes mellitus, type 2     Diabetic polyneuropathy 01/06/2022    Essential (primary) hypertension     Exotropia of both eyes " "2013    recession RSR 5.0mm w/ adj; recession LR os 5.0 w/ adj; resect MR os  4.0mm    Hearing loss     History of resection of small bowel     Hypertensive retinopathy of both eyes     Hypoglycemia     Macular degeneration     OA (osteoarthritis) of shoulder     Right    Osteoporosis     Posterior vitreous detachment of both eyes     Rhinitis     TIA (transient ischemic attack)        Past Surgical History:   Procedure Laterality Date    APPENDECTOMY      CARDIAC CATHETERIZATION      CATARACT EXTRACTION W/  INTRAOCULAR LENS IMPLANT Bilateral      SECTION, CLASSIC      CLOSURE OF LEFT ATRIAL APPENDAGE USING DEVICE N/A 2020    Procedure: Left atrial appendage closure device;  Surgeon: Abundio Curtis MD;  Location: [facility] CATH LAB;  Service: Cardiology;  Laterality: N/A;    HYSTERECTOMY      INNER EAR SURGERY      JOINT REPLACEMENT      LEFT KNEE REPLACEMENT IN  -    OOPHORECTOMY      SINUS SURGERY      STRABISMUS SURGERY  13    RSR recession 5 mm, LLR recession 5 mm and LMR resection 4mm    STRABISMUS SURGERY  2014    recess LR OD 6mm    TONSILLECTOMY      watchman surgery N/A 2020       Review of patient's allergies indicates:   Allergen Reactions    Opioids - morphine analogues Other (See Comments)     Bowel issues; bowel obstruction    Tizanidine Other (See Comments)     "Lips were numb,  Almost passed out."    Tramadol Hallucinations    Beta-blockers (beta-adrenergic blocking agts) Other (See Comments)     Can not go on beta blockers for long period of time - due to taking allergy injections    Morphine     Opioids-meperidine and related     Ciprofloxacin Rash       No current facility-administered medications on file prior to encounter.     Current Outpatient Medications on File Prior to Encounter   Medication Sig    acetaminophen (TYLENOL) 500 MG tablet Take 1,000 mg by mouth 2 (two) times a day.    aspirin (ECOTRIN) 81 MG EC tablet Take 1 " tablet (81 mg total) by mouth once daily.    azelastine (ASTELIN) 137 mcg (0.1 %) nasal spray 1 spray (137 mcg total) by Nasal route 2 (two) times daily. (Patient not taking: Reported on 1/21/2023)    diclofenac sodium (VOLTAREN) 1 % Gel Apply 2 g topically 4 (four) times daily. Use gloves to apply to right hip for 10 days    famotidine (PEPCID) 20 MG tablet Take 1 tablet (20 mg total) by mouth 2 (two) times daily. For Allergic Reaction.    fluticasone propionate (FLONASE) 50 mcg/actuation nasal spray USE 1 SPRAY IN EACH NOSTRIL ONE TIME DAILY (Patient not taking: Reported on 1/21/2023)    guaiFENesin 100 mg/5 ml (ROBITUSSIN) 100 mg/5 mL syrup Take 15 mLs by mouth once daily.    levocetirizine (XYZAL) 5 MG tablet Take 1 tablet (5 mg total) by mouth once daily. For Allergies. (Patient not taking: Reported on 1/21/2023)    LIDOcaine (LIDODERM) 5 % Place 1 patch onto the skin once daily. Remove & Discard patch within 12 hours or as directed by provider. Apply to right hip.    miconazole NITRATE 2 % (MICOTIN) 2 % top powder Apply topically 2 (two) times daily. To RLE.    OLANZapine (ZYPREXA) 2.5 MG tablet Take 1 tablet (2.5 mg total) by mouth every 8 (eight) hours as needed (non-redirectable agitation).    OLANZapine (ZYPREXA) 2.5 MG tablet Take 1 tablet (2.5 mg total) by mouth every evening.    pravastatin (PRAVACHOL) 40 MG tablet Take 1 tablet (40 mg total) by mouth once daily.    silver sulfADIAZINE 1% (SILVADENE) 1 % cream Apply to RLE skin breakdown twice daily    sodium zirconium cyclosilicate (LOKELMA) 5 gram packet Take 1 packet (5 g total) by mouth 2 (two) times a day. Mix entire contents of packet(s) into drinking glass containing 3 tablespoons of water; stir well and drink immediately. Add water and repeat until no powder remains to receive entire dose.    triamcinolone acetonide 0.1% (KENALOG) 0.1 % ointment Apply to entire body twice daily    vit C/E/Zn/coppr/lutein/zeaxan (OCUVITE LUTEIN AND  ZEAXANTHIN ORAL) Take 1 capsule by mouth once daily. Lutien 25 mg, Zeaxanthin 5 mg    vitamin E 400 UNIT capsule Take 400 Units by mouth once daily.     Family History       Problem Relation (Age of Onset)    Breast cancer Maternal Aunt    Diabetes Sister, Brother    Heart disease Sister, Sister    Hypertension Mother, Father    Liver disease Sister    No Known Problems Maternal Uncle, Paternal Aunt, Paternal Uncle, Maternal Grandmother, Maternal Grandfather, Paternal Grandmother, Paternal Grandfather, Daughter, Son, Son, Son          Tobacco Use    Smoking status: Former     Types: Cigarettes     Quit date: 10/29/1982     Years since quittin.2     Passive exposure: Never    Smokeless tobacco: Never   Substance and Sexual Activity    Alcohol use: Yes     Alcohol/week: 2.0 standard drinks     Types: 2 Shots of liquor per week     Comment: rare    Drug use: No    Sexual activity: Never     Review of Systems   Unable to perform ROS: Mental status change   Objective:     Vital Signs (Most Recent):  Temp: 99.9 °F (37.7 °C) (23 1355)  Pulse: 102 (23 1819)  Resp: 20 (23 1849)  BP: (!) 144/83 (23 1819)  SpO2: 95 % (23 1822)   Vital Signs (24h Range):  Temp:  [99.9 °F (37.7 °C)] 99.9 °F (37.7 °C)  Pulse:  [102-103] 102  Resp:  [18-50] 20  SpO2:  [95 %-96 %] 95 %  BP: (142-144)/(64-83) 144/83     Weight: 76.7 kg (169 lb)  Body mass index is 28.12 kg/m².    Physical Exam  Vitals and nursing note reviewed.   Constitutional:       General: She is not in acute distress.     Appearance: She is well-developed. She is not diaphoretic.   HENT:      Head: Normocephalic and atraumatic.      Mouth/Throat:      Pharynx: No oropharyngeal exudate.   Eyes:      General: No scleral icterus.        Right eye: No discharge.         Left eye: No discharge.      Extraocular Movements: EOM normal.      Conjunctiva/sclera: Conjunctivae normal.      Pupils: Pupils are equal, round, and reactive to light.    Neck:      Thyroid: No thyromegaly.      Vascular: No JVD.      Trachea: No tracheal deviation.   Cardiovascular:      Rate and Rhythm: Normal rate and regular rhythm.      Heart sounds: Normal heart sounds. No murmur heard.    No friction rub. No gallop.   Pulmonary:      Effort: Pulmonary effort is normal. No respiratory distress.      Breath sounds: Normal breath sounds. No stridor. No wheezing or rales.   Chest:      Chest wall: No tenderness.   Abdominal:      General: Bowel sounds are normal. There is no distension.      Palpations: Abdomen is soft. There is no mass.      Tenderness: There is no abdominal tenderness. There is no guarding or rebound.   Musculoskeletal:         General: No tenderness. Normal range of motion.      Cervical back: Normal range of motion and neck supple.   Lymphadenopathy:      Cervical: No cervical adenopathy.   Skin:     General: Skin is warm and dry.      Coloration: Skin is not pale.      Findings: No erythema or rash.      Comments: Diffuse, healing, maculo-papular rash to extremities and trunk.  Skin maceration and    Neurological:      Mental Status: She is alert. She is disoriented and confused.      GCS: GCS eye subscore is 4. GCS verbal subscore is 5. GCS motor subscore is 6.      Comments: Alert to self only   Psychiatric:         Attention and Perception: She does not perceive auditory or visual hallucinations.         Mood and Affect: Affect is labile.         Speech: Speech is rapid and pressured and tangential.         Behavior: Behavior is agitated, aggressive, hyperactive and combative.         Cognition and Memory: Cognition is impaired. Memory is impaired. She exhibits impaired recent memory and impaired remote memory.         Judgment: Judgment is impulsive.         CRANIAL NERVES     CN III, IV, VI   Pupils are equal, round, and reactive to light.  Extraocular motions are normal.      Significant Labs: All pertinent labs within the past 24 hours have been  reviewed.  Recent Results (from the past 24 hour(s))   TSH    Collection Time: 01/25/23  3:32 PM   Result Value Ref Range    TSH 1.647 0.400 - 4.000 uIU/mL   CBC auto differential    Collection Time: 01/25/23  3:32 PM   Result Value Ref Range    WBC 6.14 3.90 - 12.70 K/uL    RBC 3.17 (L) 4.00 - 5.40 M/uL    Hemoglobin 9.3 (L) 12.0 - 16.0 g/dL    Hematocrit 27.5 (L) 37.0 - 48.5 %    MCV 87 82 - 98 fL    MCH 29.3 27.0 - 31.0 pg    MCHC 33.8 32.0 - 36.0 g/dL    RDW 17.5 (H) 11.5 - 14.5 %    Platelets 221 150 - 450 K/uL    MPV 10.4 9.2 - 12.9 fL    Immature Granulocytes 1.5 (H) 0.0 - 0.5 %    Gran # (ANC) 4.3 1.8 - 7.7 K/uL    Immature Grans (Abs) 0.09 (H) 0.00 - 0.04 K/uL    Lymph # 0.6 (L) 1.0 - 4.8 K/uL    Mono # 1.1 (H) 0.3 - 1.0 K/uL    Eos # 0.0 0.0 - 0.5 K/uL    Baso # 0.02 0.00 - 0.20 K/uL    nRBC 0 0 /100 WBC    Gran % 69.5 38.0 - 73.0 %    Lymph % 9.9 (L) 18.0 - 48.0 %    Mono % 18.1 (H) 4.0 - 15.0 %    Eosinophil % 0.7 0.0 - 8.0 %    Basophil % 0.3 0.0 - 1.9 %    Differential Method Automated    Comprehensive metabolic panel    Collection Time: 01/25/23  3:32 PM   Result Value Ref Range    Sodium 142 136 - 145 mmol/L    Potassium 5.4 (H) 3.5 - 5.1 mmol/L    Chloride 109 95 - 110 mmol/L    CO2 23 23 - 29 mmol/L    Glucose 87 70 - 110 mg/dL    BUN 23 8 - 23 mg/dL    Creatinine 1.1 0.5 - 1.4 mg/dL    Calcium 8.5 (L) 8.7 - 10.5 mg/dL    Total Protein 6.7 6.0 - 8.4 g/dL    Albumin 2.9 (L) 3.5 - 5.2 g/dL    Total Bilirubin 0.7 0.1 - 1.0 mg/dL    Alkaline Phosphatase 141 (H) 55 - 135 U/L    AST 14 10 - 40 U/L    ALT 12 10 - 44 U/L    Anion Gap 10 8 - 16 mmol/L    eGFR 48 (A) >60 mL/min/1.73 m^2   Lactic acid, plasma #1    Collection Time: 01/25/23  3:32 PM   Result Value Ref Range    Lactate (Lactic Acid) 1.5 0.5 - 2.2 mmol/L   Brain natriuretic peptide    Collection Time: 01/25/23  3:32 PM   Result Value Ref Range     (H) 0 - 99 pg/mL   Troponin I    Collection Time: 01/25/23  3:32 PM   Result Value Ref  Range    Troponin I 0.006 0.000 - 0.026 ng/mL   Procalcitonin    Collection Time: 01/25/23  3:32 PM   Result Value Ref Range    Procalcitonin 0.11 <0.25 ng/mL   Magnesium    Collection Time: 01/25/23  3:32 PM   Result Value Ref Range    Magnesium 1.9 1.6 - 2.6 mg/dL   Phosphorus    Collection Time: 01/25/23  3:32 PM   Result Value Ref Range    Phosphorus 3.4 2.7 - 4.5 mg/dL   ISTAT Lactate    Collection Time: 01/25/23  3:33 PM   Result Value Ref Range    POC Lactate 1.43 0.5 - 2.2 mmol/L    Sample VENOUS     Site Other     Allens Test N/A    POCT COVID-19 Rapid Screening    Collection Time: 01/25/23  6:29 PM   Result Value Ref Range    POC Rapid COVID Negative Negative     Acceptable Yes    POCT Influenza A/B Molecular    Collection Time: 01/25/23  6:29 PM   Result Value Ref Range    POC Molecular Influenza A Ag Negative Negative, Not Reported    POC Molecular Influenza B Ag Negative Negative, Not Reported     Acceptable Yes    Lactic acid, plasma #2    Collection Time: 01/25/23  7:54 PM   Result Value Ref Range    Lactate (Lactic Acid) 1.7 0.5 - 2.2 mmol/L         Significant Imaging: I have reviewed all pertinent imaging results/findings within the past 24 hours.              Assessment/Plan:     * Delirium due to medical condition with behavioral disturbance  I have given her Zyprexa 5mg IM x 2 with very little effect  She is still quite combative  She got Ativan 1mg iv x 1 in the ED, likely inducing a paradoxical effect in her case   -Avoid further Benzo use (as well as any anticholinergic meds)  Consult Psychiatry  Soft restraints if chemical intervention fails  Her QTc was normal prior to Zyprexa, repeating now after 2 doses (total 10mg, max dose)    TSH 1.6 on 1/5.  Check B12, RPR, thiamine, SMMA, NH3  Telemetry, continuous pulse ox, 24 hour bedside sitter  May have to try Haldol if she gets much worse      Non-healing wound of right lower extremity  US 1/5/23 showed no evidence  "of deep venous thrombosis in either lower extremity  She has no fever or leukocytosis, and a normal procalcitonin  Agree with holding antibiotics for now  Consult Vascular to weigh in on venous stasis ulceration possibility   -This does not have a punched out deep, pyoderma gangrenosum appearance    ID saw her just recently at [facility].  They noted the following recommendations"  "ID previously followed and ID staff cf inflammatory component or pyoderma.  Derm defers biopsy.  Skin Cx on admit shows MRSA and patient completed 9d of vanc and doxycycline.  Suspect colonization as no active signs of infection (heat or significant ttp).  Drug rash suspected from Cipro and is now improving off abx with steroid treatment.  Has superimposed candida dermatitis under pannus and in inguinal areas.  Has been on po fluconazole.  No systemic sign of infection.  Suspect underlying chronic venous stasis and venous side insufficiency.   Plan:   As per prior recs, would monitor off of abx at this time. Complete steroids as palnned    Stop fluconazole given recent drug rash and start antifungal powder to affected areas   Continue local wound care to RLE and judicious leg elevation    Continue steroid cream for rash   Rec vasc Sx fu for eval of venous insufficiency in RLE"      Generalized skin eruption due to medication taken internally  This was attributed to Cipro, which was subsequently discontinued  The macular-papular rash seems to be fading by exam         miconazole NITRATE 2 % top powder, , Topical (Top), BID            Chronic atrial fibrillation  Patient with Paroxysmal (<7 days) atrial fibrillation which is uncontrolled. Patient is currently in Afib with RVR.  [id number] Score: 4.   Anticoagulation not indicated due to s/p Watchman device implant.  She is not on any AV juanito blockers currently.    Lopressor 5mg iv x 1 now  ASA 81mg po qday                      Stage 3b chronic kidney disease  Renal dose all meds, " "avoid NSAIDs, IV dye  At baseline     Latest Reference Range & Units 01/23/23 13:00 01/24/23 08:15 01/25/23 15:32 01/25/23 21:55   Creatinine 0.5 - 1.4 mg/dL 1.5 (H) 1.1 1.1 1.1        Primary hypertension    metoprolol injection 5 mg, 5 mg, Intravenous, Once         Normocytic anemia  Stable to monitor for now  Check B12, SMMA, folate, Fe studies     Latest Reference Range & Units 01/22/23 11:30 01/23/23 03:29 01/24/23 08:15 01/25/23 15:32   Hemoglobin 12.0 - 16.0 g/dL 9.0 (L) 8.0 (L) 9.4 (L) 9.3 (L)          Hyperkalemia  Unfortunately both of the specimens from today are slightly hemolyzed  She has had this a documented issue from [facility] as well  Today her K was 5.3 and 5.4, which correlates with most of her other levels recently, so suspect it's correct    Will continue her prior therapy:    sodium zirconium cyclosilicate packet 10 g, 10 g, Oral, BID, FAMILIA Guajardo MD        Chronic diastolic heart failure  BNP only 183 with no clinical signs of CHF  She has listed BB as allergy - will need to clarify with family, as states, "allergy shots"  She cannot take ACE/ARB due to CKD3b and elevated K  Consider adding low dose hydralazine and nitrate if BP stays elevated    TTE on 10/8/22 showed:   The left ventricle is normal in size with concentric remodeling and normal systolic function.   The estimated ejection fraction is 55-60%.   Grade III left ventricular diastolic dysfunction.   Mild mitral regurgitation.   Normal right ventricular size with mildly reduced right ventricular systolic function.   Severe tricuspid regurgitation.   The estimated PA systolic pressure is 52 mmHg. PASP may be under-estimated due to TR severity.   Elevated central venous pressure (15 mmHg).   There is pulmonary hypertension.   Severe left atrial enlargement.        Pulmonary hypertension  PAP 52 mmHg  Follow up with Cardiology  May have occult BRENDAN, so consider sleep study      History of diabetes mellitus  This " diagnosis is listed on her chart, yet she is on no therapy for it  Check HgA1c      VTE Risk Mitigation (From admission, onward)         Ordered     enoxaparin injection 40 mg  Daily         01/25/23 2122     Place ANITA hose  Until discontinued         01/25/23 2122     IP VTE HIGH RISK PATIENT  Once         01/25/23 2122     Place sequential compression device  Until discontinued         01/25/23 2122                   JENIFER Guajardo MD  Department of Hospital Medicine   [facility]

## 2023-01-26 NOTE — CARE UPDATE
Pt seen and examined at bedside. Resting comfortably, but wakes easily. Denies pain at this time. We will attempt to take off restraints and see how pt does .

## 2023-01-26 NOTE — PLAN OF CARE
Problem: Occupational Therapy  Goal: Occupational Therapy Goal  Description: Goals to be met by: 02/10/23     Patient will increase functional independence with ADLs by performing:    Feeding with Set-up Assistance.  UE Dressing with Minimal Assistance.  LE Dressing with Moderate Assistance.  Grooming while seated with Set-up Assistance.  Toileting from bedside commode with Minimal Assistance for hygiene and clothing management.   Sitting at edge of bed for time required for seated grooming or seated UB there ex w/o LOB with Supervision.  Toilet transfer to bedside commode with Contact Guard Assistance.  Upper extremity exercise program x10 reps per handout, with assistance as needed.    Outcome: Ongoing, Progressing

## 2023-01-26 NOTE — HPI
"Ms. Jimenez is a 89yo lady with a past medical history of anxiety, depression, SB resection, AF s/p watchman, HTN, DM2, TIA, and chronic cellulitis/ wound of her right lower extremity    She was recently admitted to [facility] for 15 days on 1/4/23 - yesterday, 1/24/23 for, "management of worsening RLE cellulitis. Pt received IV vancomycin and ciprofloxacin initially, vancomycin discontinued. Derm consulted for worsening rash, ciprofloxacin discontinued for suspected drug reaction, and the pt was started on doxycycline. Psych consulted for worsening delirium and agitation and pt returned to mental baseline. ID consulted for worsening cellulitis. ID recommends stopping abx and treating with antifungals and steroids. JOSHUA improving s/p IVF, Cr back to baseline. Discontinued lasix and spironolactone, continue lokelma outpatient. Patient medically stable and ready for discharge to SNF. [facility]."    She now returns from skilled unit due to agitated delirium.  As noted, this is not a new problem for her, as she had severe episodes recently at [facility].  She is unable to provide any meaningful history to me, as she it tangential, agitated, confused and occasionally combative.  I had a long talk with her daughter at the bedside as well.      In the ED her VS were BP (!) 144/83   Pulse 102   Temp 99.9 °F (37.7 °C) (Oral)   Resp 20   Ht 5' 5" (1.651 m)   Wt 76.7 kg (169 lb)   LMP  (LMP Unknown)   SpO2 95%   BMI 28.12 kg/m².  Labs showed WBC 6, Hg 9.3, K 5.4 (hemolyzed), Cr 1.1, Alb 2.9, , COVID/FLU NEG.    CXR showed the heart size is mildly enlarged but not significantly changed.  Calcification is present along the wall of the aorta.  There is osseous demineralization and degenerative change.  Increased interstitial markings appears similar to previous examination.  Left lung base is not well evaluated due to patient positioning.  It is difficult to exclude pleural fluid or parenchymal disease in " this region.  NC CT head showed no acute intracranial abnormalities identified, allowing for patient motion limitations.  No significant detrimental change compared to previous CT head from November 2022.  EKG showed Afib RVR, rate 115, PVCs, nonspecific ST-T changes, QTc 426.     In the ED she was treated with:  Medications   piperacillin-tazobactam (ZOSYN) 4.5 g in dextrose 5 % in water (D5W) 5 % 100 mL IVPB (MB+) (0 g Intravenous Stopped 1/25/23 1618)   vancomycin (VANCOCIN) 2,000 mg in dextrose 5 % (D5W) 500 mL IVPB (0 mg Intravenous Stopped 1/25/23 2007)   sodium chloride 0.9% bolus 1,000 mL 1,000 mL (0 mLs Intravenous Stopped 1/25/23 1638)   LORazepam injection 1 mg (1 mg Intravenous Given 1/25/23 1739)

## 2023-01-26 NOTE — PT/OT/SLP EVAL
Speech Language Pathology Evaluation  Bedside Swallow    Patient Name:  Jenniffer Jimenez   MRN:  [id number]  Admitting Diagnosis: Delirium due to medical condition with behavioral disturbance    Recommendations:                 General Recommendations:  Follow-up not indicated  Diet recommendations:  Regular Diet - IDDSI Level 7, Thin liquids - IDDSI Level 0   Aspiration Precautions: 1 bite/sip at a time, Assistance with meals, Frequent oral care, HOB to 90 degrees, and Small bites/sips   General Precautions: Standard,    Communication strategies:  provide increased time to answer    History:     Past Medical History:   Diagnosis Date    Amblyopia of left eye 04/10/2013    Anxiety     Arthritis     Facet arthropathy, Lumbosacral    Atherosclerosis of aorta     Cataract     Central retinal vein occlusion of left eye     CKD (chronic kidney disease) stage 3, GFR 30-59 ml/min     Depression     Diabetes mellitus, type 2     Diabetic polyneuropathy 2022    Essential (primary) hypertension     Exotropia of both eyes 2013    recession RSR 5.0mm w/ adj; recession LR os 5.0 w/ adj; resect MR os  4.0mm    Hearing loss     History of resection of small bowel     Hypertensive retinopathy of both eyes     Hypoglycemia     Macular degeneration     OA (osteoarthritis) of shoulder     Right    Osteoporosis     Paroxysmal atrial fibrillation     Posterior vitreous detachment of both eyes     Rhinitis     TIA (transient ischemic attack)        Past Surgical History:   Procedure Laterality Date    APPENDECTOMY      CARDIAC CATHETERIZATION      CATARACT EXTRACTION W/  INTRAOCULAR LENS IMPLANT Bilateral      SECTION, CLASSIC      CLOSURE OF LEFT ATRIAL APPENDAGE USING DEVICE N/A 2020    Procedure: Left atrial appendage closure device;  Surgeon: Abundio Curtis MD;  Location: [facility] CATH LAB;  Service: Cardiology;  Laterality: N/A;    HYSTERECTOMY      INNER EAR SURGERY      JOINT REPLACEMENT      LEFT KNEE  REPLACEMENT IN 2013 -    OOPHORECTOMY      SINUS SURGERY      STRABISMUS SURGERY  2/6/13    RSR recession 5 mm, LLR recession 5 mm and LMR resection 4mm    STRABISMUS SURGERY  03/19/2014    recess LR OD 6mm    TONSILLECTOMY      watchman surgery N/A 11/2020       Social History: Patient lives at [facility].    Prior Intubation HX:  None    Modified Barium Swallow: None per EMR    Chest X-Rays:   1/25/23    FINDINGS:  Heart size is mildly enlarged but not significantly changed.  Calcification is present along the wall of the aorta.  There is osseous demineralization and degenerative change.  Increased interstitial markings appears similar to previous examination.  Left lung base is not well evaluated due to patient positioning.  It is difficult to exclude pleural fluid or parenchymal disease in this region.  Consider repeat imaging.     Prior diet: Unknown 2/2 pt poor historian.    Subjective     ST obtained clearance from pt's nurse for completion of b/s swallow eval. Per pt's sitter, pt consumed large portion of regular breakfast tray w/o difficulty. Pt pleasantly confused throughout eval, however, was agreeable to all po trials offered.     Patient goals: Cont po intake      Pain/Comfort:  Pain Rating 1: 0/10    Respiratory Status: Nasal cannula, flow 2 L/min    Objective:     Oral Musculature Evaluation  Oral Musculature: WFL  Dentition: present and adequate  Secretion Management: adequate  Mucosal Quality: good  Oral Labial Strength and Mobility: WFL  Lingual Strength and Mobility: WFL    Bedside Swallow Eval:   Consistencies Assessed:  Thin liquids -Half a cup of water via straw  Puree -x5 of pudding via tsp  Solids -x2 bite-sized pieces of patrizia cracker      Oral Phase:   WFL    Pharyngeal Phase:   no overt clinical signs/symptoms of aspiration  no overt clinical signs/symptoms of pharyngeal dysphagia    Compensatory Strategies  None    Treatment: It should be noted that silent aspiration cannot be  r/o via b/s assessment. ST discussed diet recs with the pt's sitter, nurse, and MD which included cont regular diet with thin liquids.     Assessment:     Jenniffer Jimenez is a 89 y.o. female with a dx of Delirium due to medical condition with behavioral disturbance who presents with adequate swallowing fx to cont current regular diet with thin liquids. Meds whole, 1 at a time. No further ST is warranted at this time, as the pt is consuming the safest and least restrictive diet.     Goals:   Multidisciplinary Problems       SLP Goals       Not on file                    Plan:     Patient to be seen:      Plan of Care expires:     Plan of Care reviewed with:  caregiver   SLP Follow-Up:  No       Discharge recommendations:      Barriers to Discharge:  None    Time Tracking:     SLP Treatment Date:   01/26/23  Speech Start Time:  0850  Speech Stop Time:  0905     Speech Total Time (min):  15 min    Billable Minutes: Eval Swallow and Oral Function 15    01/26/2023

## 2023-01-26 NOTE — NURSING
Pt unable to urinate, bladder scan shows 587ml. Dr. Melara notified - order for Sinclair cath placed.

## 2023-01-26 NOTE — CONSULTS
"[facility] Surg  Psychiatry  Consult Note    Patient Name: Jenniffer Jimenez  MRN: [id number]   Code Status: Full Code  Admission Date: 1/25/2023  Hospital Length of Stay: 1 days  Attending Physician: Sarabjit Melara III, MD  Primary Care Provider: Rubi Young DO    Current Legal Status: Uncontested    Patient information was obtained from patient, ER records and chart review, nursing.   Inpatient consult to Psychiatry  Consult performed by: Noel Robles MD  Consult ordered by: FAMILIA Guajardo MD        Subjective:     Principal Problem:Delirium due to medical condition with behavioral disturbance    Chief Complaint:  Altered mental status     HPI: Patient Jenniffer Jimenez was admitted with agitated delirium and worsening cellulitis of her extremities.  Patient was cared for at [facility] main campus recently for this and discharged to care at [facility], where she became agitated.  Sent here for care.  Psychiatric consult was placed for confusion and agitation.  Patient was given 2 injections of olanzapine and 1 injection of lorazepam last night and placed in restraints.  She is very sedated today and doesn't waken easily to engage in conversation.  When I try to wake her, she is mumbling incoherently.  Also noted to be shaking in her extremities.  Bedside sitter states that she was talking this morning but sluggish and tired today.  While at [facility] for her recent care, she was given olanzapine as a scheduled medication daily for her agitation.  There is not documented history of mental illness.  Records state the she is "sharp" for her age.      Hospital Course: No notes on file         Patient History               Medical as of 1/26/2023       Past Medical History       Diagnosis Date Comments Source    Amblyopia of left eye 04/10/2013 -- Provider    Arthritis -- Facet arthropathy, Lumbosacral Provider    Atherosclerosis of aorta -- -- Provider    Cataract -- -- Provider    " Central retinal vein occlusion of left eye -- -- Provider    CKD (chronic kidney disease) stage 3, GFR 30-59 ml/min -- -- Provider    Diabetes mellitus, type 2 -- -- Provider    Diabetic polyneuropathy 01/06/2022 -- Provider    Essential (primary) hypertension -- -- Provider    Exotropia of both eyes 02/06/2013 recession RSR 5.0mm w/ adj; recession LR os 5.0 w/ adj; resect MR os  4.0mm Provider    Hearing loss -- -- Provider    History of resection of small bowel -- -- Provider    Hypertensive retinopathy of both eyes -- -- Provider    Hypoglycemia -- -- Provider    Macular degeneration -- -- Provider    OA (osteoarthritis) of shoulder -- Right Provider    Osteoporosis -- -- Provider    Paroxysmal atrial fibrillation -- -- Provider    Posterior vitreous detachment of both eyes -- -- Provider    Psychiatric problem -- -- Provider    Rhinitis -- -- Provider    TIA (transient ischemic attack) -- -- Provider              Pertinent Negatives       Diagnosis Date Noted Comments Source    Angio-edema 10/24/2012 -- Provider    Asthma 10/24/2012 -- Provider    Asthma 02/24/2014 -- Provider    Basal cell carcinoma 10/20/2015 -- Provider    Cataract 10/15/2012 -- Provider    Diabetes mellitus 10/15/2012 -- Provider    Diabetic retinopathy 10/15/2012 -- Provider    Diabetic retinopathy 04/07/2021 -- Provider    Eczema 10/24/2012 -- Provider    Glaucoma 10/15/2012 -- Provider    Glaucoma 04/07/2021 -- Provider    History of psychiatric hospitalization 01/26/2023 -- Provider    Melanoma 10/20/2015 -- Provider    Recurrent upper respiratory infection (URI) 10/24/2012 -- Provider    Retinal detachment 10/15/2012 -- Provider    Sickle cell anemia 01/08/2016 -- Provider    Sickle cell trait 01/08/2016 -- Provider    Squamous cell carcinoma 10/20/2015 -- Provider    Stroke 02/24/2014 -- Provider    Syncope and collapse 02/24/2014 -- Provider    Therapy 01/26/2023 -- Provider    Urticaria 10/24/2012 -- Provider    Uveitis 10/15/2012 --  Provider                          Surgical as of 2023       Past Surgical History       Procedure Laterality Date Comments Source    CARDIAC CATHETERIZATION -- -- -- Provider    INNER EAR SURGERY -- -- -- Provider    SINUS SURGERY -- -- -- Provider    TONSILLECTOMY -- -- -- Provider     SECTION, CLASSIC -- -- -- Provider    APPENDECTOMY -- -- -- Provider    STRABISMUS SURGERY -- 13 RSR recession 5 mm, LLR recession 5 mm and LMR resection 4mm Provider    STRABISMUS SURGERY -- 2014 recess LR OD 6mm Provider    CATARACT EXTRACTION W/  INTRAOCULAR LENS IMPLANT Bilateral -- -- Provider    HYSTERECTOMY -- -- -- Provider    OOPHORECTOMY -- -- -- Provider    JOINT REPLACEMENT -- -- LEFT KNEE REPLACEMENT IN  - Provider    CLOSURE OF LEFT ATRIAL APPENDAGE USING DEVICE N/A 2020 Procedure: Left atrial appendage closure device;  Surgeon: Abundio Curtis MD;  Location: [facility] CATH LAB;  Service: Cardiology;  Laterality: N/A; Provider    watchman surgery [Other] N/A 2020 -- Provider                          Family as of 2023       Problem Relation Name Age of Onset Comments Source    Hypertension Mother -- -- -- Provider    Hypertension Father -- -- -- Provider    Liver disease Sister -- -- -- Provider    Diabetes Sister -- -- -- Provider    Heart disease Sister -- -- -- Provider    Diabetes Brother -- -- -- Provider    Breast cancer Maternal Aunt -- -- -- Provider    No Known Problems Maternal Uncle -- -- -- Provider    No Known Problems Paternal Aunt -- -- -- Provider    No Known Problems Paternal Uncle -- -- -- Provider    No Known Problems Maternal Grandmother -- -- -- Provider    No Known Problems Maternal Grandfather -- -- -- Provider    No Known Problems Paternal Grandmother -- -- -- Provider    No Known Problems Paternal Grandfather -- -- -- Provider    No Known Problems Daughter -- -- -- Provider    No Known Problems Son -- -- -- Provider    Heart disease Sister -- -- -- Provider     No Known Problems Son -- -- -- Provider    No Known Problems Son -- -- -- Provider    Cancer Neg Hx -- -- in first degree relatives Provider    Melanoma Neg Hx -- -- -- Provider    Psoriasis Neg Hx -- -- -- Provider    Lupus Neg Hx -- -- -- Provider    Amblyopia Neg Hx -- -- -- Provider    Blindness Neg Hx -- -- -- Provider    Cataracts Neg Hx -- -- -- Provider    Glaucoma Neg Hx -- -- -- Provider    Macular degeneration Neg Hx -- -- -- Provider    Retinal detachment Neg Hx -- -- -- Provider    Strabismus Neg Hx -- -- -- Provider    Stroke Neg Hx -- -- -- Provider    Thyroid disease Neg Hx -- -- -- Provider                  Tobacco Use as of 1/26/2023       Smoking Status Smoking Start Date Quit Date Smoking Frequency    Former -- 10/29/1982       Passive Exposure    Never      Smokeless Status Smokeless Type Smokeless Quit Date    Never -- --      Source    Provider                  Alcohol Use as of 1/26/2023       Alcohol Use Drinks/Week Alcohol/Week Comments Source    Yes 2 Shots of liquor 2.0 standard drinks rare Provider                  Drug Use as of 1/26/2023       Drug Use Types Frequency Comments Source    No -- -- -- Provider                  Sexual Activity as of 1/26/2023       Sexually Active Birth Control Partners Comments Source    Never -- -- -- Provider                  Activities of Daily Living as of 1/26/2023       Activities of Daily Living Question Response Comments Source    Patient feels they ought to cut down on drinking/drug use Not Asked -- Provider    Patient annoyed by others criticizing their drinking/drug use Not Asked -- Provider    Patient has felt bad or guilty about drinking/drug use Not Asked -- Provider    Patient has had a drink/used drugs as an eye opener in the AM Not Asked -- Provider    Are you pregnant or think you may be? No -- Provider    Breast-feeding No -- Provider                  Social Documentation as of 1/26/2023    None               Occupational as of  2023       Occupation Employer Comments Source    retired -- -- Provider                  Socioeconomic as of 2023       Marital Status Spouse Name Number of Children Years Education Education Level Preferred Language Ethnicity Race Source     -- -- -- -- English Not  or /a White Provider                  Pertinent History       Question Response Comments    Lives with family --    Place in Birth Order other --    Lives in home --    Number of Siblings 5 --    Raised by biological parents --    Legal Involvement none --    Childhood Trauma uneventful --    Criminal History of none --    Financial Status other --    Highest Level of Education Bachelor's Degree --    Does patient have access to a firearm? No --     Service No --    Primary Leisure Activity time with family --    Spirituality actively participates in organized L[employer]-day --          Past Medical History:   Diagnosis Date    Amblyopia of left eye 04/10/2013    Arthritis     Facet arthropathy, Lumbosacral    Atherosclerosis of aorta     Cataract     Central retinal vein occlusion of left eye     CKD (chronic kidney disease) stage 3, GFR 30-59 ml/min     Diabetes mellitus, type 2     Diabetic polyneuropathy 2022    Essential (primary) hypertension     Exotropia of both eyes 2013    recession RSR 5.0mm w/ adj; recession LR os 5.0 w/ adj; resect MR os  4.0mm    Hearing loss     History of resection of small bowel     Hypertensive retinopathy of both eyes     Hypoglycemia     Macular degeneration     OA (osteoarthritis) of shoulder     Right    Osteoporosis     Paroxysmal atrial fibrillation     Posterior vitreous detachment of both eyes     Psychiatric problem     Rhinitis     TIA (transient ischemic attack)      Past Surgical History:   Procedure Laterality Date    APPENDECTOMY      CARDIAC CATHETERIZATION      CATARACT EXTRACTION W/  INTRAOCULAR LENS IMPLANT Bilateral       "SECTION, CLASSIC      CLOSURE OF LEFT ATRIAL APPENDAGE USING DEVICE N/A 2020    Procedure: Left atrial appendage closure device;  Surgeon: Abundio Curtis MD;  Location: [facility] CATH LAB;  Service: Cardiology;  Laterality: N/A;    HYSTERECTOMY      INNER EAR SURGERY      JOINT REPLACEMENT      LEFT KNEE REPLACEMENT IN 2013 -    OOPHORECTOMY      SINUS SURGERY      STRABISMUS SURGERY  13    RSR recession 5 mm, LLR recession 5 mm and LMR resection 4mm    STRABISMUS SURGERY  2014    recess LR OD 6mm    TONSILLECTOMY      watchman surgery N/A 2020     Family History       Problem Relation (Age of Onset)    Breast cancer Maternal Aunt    Diabetes Sister, Brother    Heart disease Sister, Sister    Hypertension Mother, Father    Liver disease Sister    No Known Problems Maternal Uncle, Paternal Aunt, Paternal Uncle, Maternal Grandmother, Maternal Grandfather, Paternal Grandmother, Paternal Grandfather, Daughter, Son, Son, Son          Tobacco Use    Smoking status: Former     Types: Cigarettes     Quit date: 10/29/1982     Years since quittin.2     Passive exposure: Never    Smokeless tobacco: Never   Substance and Sexual Activity    Alcohol use: Yes     Alcohol/week: 2.0 standard drinks     Types: 2 Shots of liquor per week     Comment: rare    Drug use: No    Sexual activity: Never     Review of patient's allergies indicates:   Allergen Reactions    Opioids - morphine analogues Other (See Comments)     Bowel issues; bowel obstruction    Tizanidine Other (See Comments)     "Lips were numb,  Almost passed out."    Tramadol Hallucinations    Beta-blockers (beta-adrenergic blocking agts) Other (See Comments)     Can not go on beta blockers for long period of time - due to taking allergy injections    Morphine     Opioids-meperidine and related     Ciprofloxacin Rash       No current facility-administered medications on file prior to encounter.     Current Outpatient Medications on " File Prior to Encounter   Medication Sig    acetaminophen (TYLENOL) 500 MG tablet Take 1,000 mg by mouth 2 (two) times a day.    aspirin (ECOTRIN) 81 MG EC tablet Take 1 tablet (81 mg total) by mouth once daily.    azelastine (ASTELIN) 137 mcg (0.1 %) nasal spray 1 spray (137 mcg total) by Nasal route 2 (two) times daily. (Patient not taking: Reported on 1/21/2023)    diclofenac sodium (VOLTAREN) 1 % Gel Apply 2 g topically 4 (four) times daily. Use gloves to apply to right hip for 10 days    famotidine (PEPCID) 20 MG tablet Take 1 tablet (20 mg total) by mouth 2 (two) times daily. For Allergic Reaction.    fluticasone propionate (FLONASE) 50 mcg/actuation nasal spray USE 1 SPRAY IN EACH NOSTRIL ONE TIME DAILY (Patient not taking: Reported on 1/21/2023)    guaiFENesin 100 mg/5 ml (ROBITUSSIN) 100 mg/5 mL syrup Take 15 mLs by mouth once daily.    levocetirizine (XYZAL) 5 MG tablet Take 1 tablet (5 mg total) by mouth once daily. For Allergies. (Patient not taking: Reported on 1/21/2023)    LIDOcaine (LIDODERM) 5 % Place 1 patch onto the skin once daily. Remove & Discard patch within 12 hours or as directed by provider. Apply to right hip.    miconazole NITRATE 2 % (MICOTIN) 2 % top powder Apply topically 2 (two) times daily. To RLE.    OLANZapine (ZYPREXA) 2.5 MG tablet Take 1 tablet (2.5 mg total) by mouth every 8 (eight) hours as needed (non-redirectable agitation).    OLANZapine (ZYPREXA) 2.5 MG tablet Take 1 tablet (2.5 mg total) by mouth every evening.    pravastatin (PRAVACHOL) 40 MG tablet Take 1 tablet (40 mg total) by mouth once daily.    silver sulfADIAZINE 1% (SILVADENE) 1 % cream Apply to RLE skin breakdown twice daily    sodium zirconium cyclosilicate (LOKELMA) 5 gram packet Take 1 packet (5 g total) by mouth 2 (two) times a day. Mix entire contents of packet(s) into drinking glass containing 3 tablespoons of water; stir well and drink immediately. Add water and repeat until no powder  "remains to receive entire dose.    triamcinolone acetonide 0.1% (KENALOG) 0.1 % ointment Apply to entire body twice daily    vit C/E/Zn/coppr/lutein/zeaxan (OCUVITE LUTEIN AND ZEAXANTHIN ORAL) Take 1 capsule by mouth once daily. Lutien 25 mg, Zeaxanthin 5 mg    vitamin E 400 UNIT capsule Take 400 Units by mouth once daily.     Psychotherapeutics (From admission, onward)      None          Review of Systems   Unable to perform ROS: Mental status change   Strengths and Liabilities: Liability: Patient has poor health., Liability: Patient has possible cognitive impairment., Liability: Patient lacks coping skills.    Objective:     Vital Signs (Most Recent):  Temp: 98.3 °F (36.8 °C) (01/26/23 1046)  Pulse: 71 (01/26/23 1046)  Resp: 20 (01/26/23 1046)  BP: 114/69 (01/26/23 1046)  SpO2: 97 % (01/26/23 1046) Vital Signs (24h Range):  Temp:  [98 °F (36.7 °C)-99.7 °F (37.6 °C)] 98.3 °F (36.8 °C)  Pulse:  [] 71  Resp:  [18-50] 20  SpO2:  [93 %-100 %] 97 %  BP: (106-144)/(60-83) 114/69     Height: 5' 5" (165.1 cm)  Weight: 83.1 kg (183 lb 3.2 oz)  Body mass index is 30.49 kg/m².      Intake/Output Summary (Last 24 hours) at 1/26/2023 1456  Last data filed at 1/26/2023 0830  Gross per 24 hour   Intake 1459 ml   Output 400 ml   Net 1059 ml       Physical Exam  Psychiatric:      Comments: EXAMINATION    CONSTITUTIONAL  General Appearance: hospital attire    MUSCULOSKELETAL  Muscle Strength and Tone: weak  Abnormal Involuntary Movements: shaking noted to both arms  Gait and Station: not observed    PSYCHIATRIC MENTAL STATUS EXAM   Level of Consciousness: sleeping heavily  Orientation: not able to obtain  Grooming: limited  Psychomotor Behavior: sedated  Speech: incoherent mumbling  Language: not able to obtain  Mood: not able to obtain  Affect: blunted  Thought Process: not able to obtain  Associations: not able to obtain  Thought Content: not able to obtain  Memory: not able to obtain  Attention: sleeping  Fund of " Knowledge: not able to obtain  Insight: not able to obtain  Judgment: not able to obtain            Significant Labs: All pertinent labs within the past 24 hours have been reviewed.    Significant Imaging: I have reviewed all pertinent imaging results/findings within the past 24 hours.    Assessment/Plan:     * Delirium due to medical condition with behavioral disturbance  Patient with agitated delirium.  Reports state that she has a normal functioning baseline.  Delirium likely due to illnesses, medications, or frequent change of hospitalization.  Start scheduled quetiapine in low dose 25 mg nighty to see if this may help.  Avoid benzos and anticholinergics which may worsen delirium.  Utilize haloperidol 1-2 mg IV every 6 hours PRN acute psychotic or non-redirectable agitation (QTc reviewed as consistently below 450 ms).  Utilize restraints PRN patient and staff safety.         Total Time:  60 minutes      Noel Robles MD   Psychiatry  [facility] Surg

## 2023-01-26 NOTE — PT/OT/SLP EVAL
Physical Therapy Evaluation    Patient Name:  Jenniffer Jimenez   MRN:  [id number]    Recommendations:     Discharge Recommendations: nursing facility, skilled (back to [facility])   Discharge Equipment Recommendations:  (TBD)   Barriers to discharge home:  Pt with confusion, decreased safety awareness, decreased functional mobility and decreased ambulation at this time.    Assessment:     Jenniffer Jimenez is a 89 y.o. female admitted with a medical diagnosis of Delirium due to medical condition with behavioral disturbance.  She presents with the following impairments/functional limitations: weakness, impaired endurance, impaired self care skills, impaired functional mobility, gait instability, impaired balance, impaired cognition, decreased coordination, decreased upper extremity function, decreased lower extremity function, decreased safety awareness, pain, decreased ROM, impaired skin, edema, impaired cardiopulmonary response to activity.    Rehab Prognosis: Fair; patient would benefit from acute skilled PT services to address these deficits and reach maximum level of function.    Recent Surgery: * No surgery found *      Plan:     During this hospitalization, patient to be seen 6 x/week to address the identified rehab impairments via gait training, therapeutic activities, therapeutic exercises, wheelchair management/training and progress toward the following goals:    Plan of Care Expires:  02/09/23    Subjective     Chief Complaint: confusion  Patient/Family Comments/goals: Pt agreeable to therapy.   Pain/Comfort:  Pain Rating 1:  (RLE pain)  Pain Addressed 1: Reposition, Distraction, Cessation of Activity      Living Environment:  Pt unable to provide information.  Per chart, pt lives alone at [facility] 5th floor apartment with elevators.  Pt was recently at [facility] and D/C'ed to [facility], now transferred to [facility].   Prior to admission,  patients level of function was mod I with ambulation using rollator.  Equipment at home: cane, straight, rollator.  Upon discharge, patient will have limited assistance from dtr.    Objective:     Communicated with nurse Bishop prior to session.  Patient found with bed in chair position finishing up lunch with bed alarm, oxygen, PureWick, peripheral IV, pulse ox (continuous), telemetry (hospital safety sitter), B pressure relief boots, LLE SCD, and Avasys monitor upon PT entry to room.    General Precautions: Standard, fall, respiratory, hearing impaired, diabetic  Orthopedic Precautions:N/A   Braces: N/A  Respiratory Status: Nasal cannula, flow 2 L/min    Exams:  Cognitive Exam:  Patient is oriented to Person.  Pt able to follow simple commands.   Gross Motor Coordination:  WFL  Postural Exam:  Patient presented with the following abnormalities:    -       Rounded shoulders  -       Forward head  Skin Integrity/Edema:      -       Skin integrity: RLE with dressings intact  -       Edema: Moderate RLE  BLE ROM: WFL  BLE Strength: WFL    Functional Mobility: Pt was alert and confused, finishing her lunch.  Pt with good appetite, able to follow simple commands.  Pt hyperverbal at this time, unable to directly answer any questions.  Pt calm, pleasant, and cooperative with therapy.  Pt recognized that she has been confused, stated that it's because of her antibiotics.  Pt was continuously reoriented to [facility], [facility].  Pt kept talking about home and [facility].  PT will continue to assess.    Bed Mobility:     Scooting: contact guard assistance and minimum assistance  Supine to Sit: moderate assistance  Sit to Supine: moderate assistance  Transfers:     Sit to Stand: minimum assistance with rolling walker x2 trials   Gait: Pt ambulated ~6 sidesteps and ~4 sidesteps with min A using RW and on 2L O2 NC.  Pt with minimal posterior trunk lean, decreased weight shifting, decreased foot clearance, and  decreased step length.    Balance: Pt with fair static sit balance and fair/fair- static stand balance.       AM-PAC 6 CLICK MOBILITY  Total Score:14       Treatment & Education:  BLE seated there x10 reps: hip flex, LAQ, and AP.    Pt educated on acute skilled PT services and goals, will need reinforcement.  No family present at this time.     Patient left with bed in chair position with all lines intact, call button in reach, bed alarm on, nurse Edna notified, and hospital safety sitter and Avasys monitor present.  B pressure relief boots and LLE SCD reapplied.     GOALS:   Multidisciplinary Problems       Physical Therapy Goals          Problem: Physical Therapy    Goal Priority Disciplines Outcome Goal Variances Interventions   Physical Therapy Goal     PT, PT/OT Ongoing, Progressing     Description: Goals to be met by: 23     Patient will increase functional independence with mobility by performin. Supine to sit with Stand-by Assistance  2. Rolling to Left and Right with Stand-by Assistance  3. Sit to stand transfer with Stand-by Assistance using RW  4. Bed to chair transfer with Stand-by Assistance using Rolling Walker  5. Gait >50 feet with Stand-by Assistance using Rolling Walker   6. Wheelchair propulsion >50 feet with Stand-by Assistance using bilateral upper extremities  7. Lower extremity exercise program 2 sets x15 reps per handout, with independence                         History:     Past Medical History:   Diagnosis Date    Amblyopia of left eye 04/10/2013    Arthritis     Facet arthropathy, Lumbosacral    Atherosclerosis of aorta     Cataract     Central retinal vein occlusion of left eye     CKD (chronic kidney disease) stage 3, GFR 30-59 ml/min     Diabetes mellitus, type 2     Diabetic polyneuropathy 2022    Essential (primary) hypertension     Exotropia of both eyes 2013    recession RSR 5.0mm w/ adj; recession LR os 5.0 w/ adj; resect MR os  4.0mm    Hearing loss      History of resection of small bowel     Hypertensive retinopathy of both eyes     Hypoglycemia     Macular degeneration     OA (osteoarthritis) of shoulder     Right    Osteoporosis     Paroxysmal atrial fibrillation     Posterior vitreous detachment of both eyes     Psychiatric problem     Rhinitis     TIA (transient ischemic attack)        Past Surgical History:   Procedure Laterality Date    APPENDECTOMY      CARDIAC CATHETERIZATION      CATARACT EXTRACTION W/  INTRAOCULAR LENS IMPLANT Bilateral      SECTION, CLASSIC      CLOSURE OF LEFT ATRIAL APPENDAGE USING DEVICE N/A 2020    Procedure: Left atrial appendage closure device;  Surgeon: Abundio Curtis MD;  Location: [facility] CATH LAB;  Service: Cardiology;  Laterality: N/A;    HYSTERECTOMY      INNER EAR SURGERY      JOINT REPLACEMENT      LEFT KNEE REPLACEMENT IN 2013 -    OOPHORECTOMY      SINUS SURGERY      STRABISMUS SURGERY  13    RSR recession 5 mm, LLR recession 5 mm and LMR resection 4mm    STRABISMUS SURGERY  2014    recess LR OD 6mm    TONSILLECTOMY      watchman surgery N/A 2020       Time Tracking:     PT Received On: 23  PT Start Time: 1538     PT Stop Time: 1608  PT Total Time (min): 30 min     Billable Minutes: Evaluation 20 min and Therapeutic Exercise 10 min      2023

## 2023-01-26 NOTE — NURSING
[facility] MEWS TRIGGER FOLLOW UP       MEWS Monitoring, Score is: 3  Indication for review: HR    Bedside Nurse, Brisa contacted, MD aware/ following, instructed to call [phone] for further concerns or assistance.     Pt agitated, confused, attempting to get out of bed, not redirectable or able to be reasoned with. 5 mg IM zyprexa already given. MD Guajardo ordered second dose of 5 mg IM zyprexa. Care ongoing.

## 2023-01-26 NOTE — PT/OT/SLP EVAL
"Occupational Therapy   Evaluation    Name: Jenniffer Jimenez  MRN: [id number]  Admitting Diagnosis: Delirium due to medical condition with behavioral disturbance  Recent Surgery: * No surgery found *      Recommendations:     Discharge Recommendations: nursing facility, skilled  Discharge Equipment Recommendations:   (TBD at NH)  Barriers to discharge:   (acuity, delirium, motor restlessness, retraints and zsSitter in place per over aching nursing POC.)    Assessment:     Jenniffer Jimenez is a 89 y.o. female with a medical diagnosis of Delirium due to medical condition with behavioral disturbance, and presents with AMS, marked motor restlessness, impaired func communication pursuant to all herein. Max increase  assistance needed vs recent norm self care levels per chart.    Performance deficits affecting function: weakness, impaired balance, impaired endurance, impaired self care skills, impaired cognition, impaired functional mobility, decreased coordination, impaired skin, decreased safety awareness, decreased lower extremity function, decreased upper extremity function.      Rehab Prognosis: Good; patient would benefit from acute skilled OT services to address these deficits and reach maximum level of function.       Plan:     Patient to be seen 3 x/week, 5 x/week to address the above listed problems via self-care/home management, therapeutic activities, therapeutic exercises, neuromuscular re-education  Plan of Care Expires: 02/09/23  Plan of Care Reviewed with: patient (RN, no family present.)    Subjective     Chief Complaint: Patient cheerful, disoriented, unable to make needs known , word salad, no family present.   Patient/Family Comments/goals: Unable to obtain.     Occupational Profile:  Living Environment: Per chart review of al jan 2023 [facility]: "Patient lives alone at [facility] with elevator access to 5th floor.   Prior Level of " Function: modified (I) for mobility using rollator; reports (I) ADLs; not driving.   DME owned: single point cane and rollator  Support Available/Caregiver Assistance:  Daughter resides locally ~ 1 mile from Patient and offers  occasional (A)  Equipment Used at Home: wheelchair, walker, rolling (per chart.)    Further: Patient unable to report in absolute terms, no family present. Patient discharged to [facility] when shortly re-admitted to hosp for RLE cellulitis related interventions and AMS, currently in restraints w/ 1: 1 hosp provided sitter at [facility]. RN reporting high Patient injury risks at present.     Pain/Comfort:  Pain Rating 1: 0/10    Patients cultural, spiritual, Scientologist conflicts given the current situation: no    Objective:     Communicated with: RN prior to session.  Patient found supine with bed alarm, peripheral IV, telemetry, PureWick, restraints, pressure boots, upon OT entry to room.    General Precautions: Standard, aspiration, fall, respiratory (delirium precautions)  Orthopedic Precautions: N/A  Braces: N/A  Respiratory Status: Nasal cannula, flow 2 L/min  Hemoglobin  8.4  01/26 0417  Hematocrit  25.9  01/26 0417    Occupational Performance:    Bed Mobility:    Patient completed Rolling/Turning to Left with  minimum assistance  Patient completed Rolling/Turning to Right with maximal assistance  Patient completed Scooting max /Bridging minimum assistance spontaneous  Patient completed Supine to Sit with min a  Patient completed Sit to Supine with max a     Functional Mobility/Transfers: Deemed unsafe 2* motor restlessness, absent Patient inability to follow 1 step familiar direction. PCT/nursing staff entry for Patient care. Trials halted.      Activities of Daily Living:  Feeding:  min a am     Grooming: maximal assistance    Upper Body Dressing: maximal assistance    Lower Body Dressing: dependence RLE cellulitis, (bandages)   Toileting: Pure wick      Cognitive/Visual  Perceptual:  Cognitive/Psychosocial Skills:     -       Oriented to: Person   -       Follows Commands/attention:unable to follow 1 step/familiar with supportive cog.   -       Communication: clear/nonsensical/word salad  -       Memory: ory  -       Safety awareness/insight to disability: Care staff reliatr, 1:1 visual contact required. -       Mood/Affect/Coping skills/emotional control: Excited, Happy, Pleasant,  disorganized    Patient presents with reduced mobility, UB strength, and stamina deficits, which impact the patient's ability to complete functional tasks & activities safely &  and in a timely manner. Pt could benefit from a collaborative therapeutic program to improve quality of life and sustain focus on impairment resoltion. Patient outlook for progression towards goals (+) at this time 2* recent high level functioning < 2 weeks s/p per chart.        Upper Body Physical Exam:  RESPIRATORY: non-labored / normal effort / rate. (-) accessory MM engagement.    UB SKIN: Observable UB skin warm and dry/patchy redness grossly < distally.   Sensation: Norm in UB extremities light touch/localization.  Posture: Norm <>  Rounded shoulders, post pelvic tilt in sitting   BUE AROM WNL  BUE MMT  difficult to accurately assess 2* AMS, Patient subjectively attempting cooperation, spontaneous 3+ < 4-/5  UB GM/FM coordination impaired 2* motor restlessness  UT static / dynamic standing bedside per nursing advisement  (-) UB edema      Treatment & Education:  Discussed role of OT,eval and collaborative POC dev completed. Patient unable to endorses fullest understanding / agreement with all herein.   Interventions:   UE ROM/MMT assessment  Bed mobility training / assessment  Functional mobility assessment  Sitting balance assessment  Educated on importance of sitting OOB in bedside chair to promote increased strength, endurance & proper breathing.    Intro UB seated  AROM constructs (BUE all joints, all planes seated  (1x10, x3 daily recommended) for (I) ex as suggested to counter (-) effects of limited mobility inherent in acute setting. Patient unable to attend sufficiently for completion of info integration, follow up recommended. (+) Patient responses to AAROM BUE all joints tis am in long sit.    Orienting interventions undertaken: Lights on. Patient repositioned to upright, mid line orientation UB/BUE resting neutral and cervical pillow support provided at sessions end to sustain  and counter risks of BUE distal edema related to restrains and dep positioning risks. Marked (+) Patient responses vs as encountered. Patient cheerful/interactive w/ cog assist, soothing music (w/ Patient input) also provided (soft jazz).       AMPAC 6 Click ADL:  AMPAC Total Score: 7    Patient left HOB elevated  Mid> High Cardona's, as above with all lines intact, call button in hand, clipped to gown, bed alarm on, restraints / pressure boots reapplied at end of session, RN notified, and PCT present.    GOALS:   Multidisciplinary Problems       Occupational Therapy Goals          Problem: Occupational Therapy    Goal Priority Disciplines Outcome Interventions   Occupational Therapy Goal     OT, PT/OT Ongoing, Progressing    Description: Goals to be met by: 02/10/23     Patient will increase functional independence with ADLs by performing:    Feeding with Set-up Assistance.  UE Dressing with Minimal Assistance.  LE Dressing with Moderate Assistance.  Grooming while seated with Set-up Assistance.  Toileting from bedside commode with Minimal Assistance for hygiene and clothing management.   Sitting at edge of bed for time required for seated grooming or seated UB there ex w/o LOB with Supervision.  Toilet transfer to bedside commode with Contact Guard Assistance.  Upper extremity exercise program x10 reps per handout, with assistance as needed.                         History:     Past Medical History:   Diagnosis Date    Amblyopia of left eye  04/10/2013    Anxiety     Arthritis     Facet arthropathy, Lumbosacral    Atherosclerosis of aorta     Cataract     Central retinal vein occlusion of left eye     CKD (chronic kidney disease) stage 3, GFR 30-59 ml/min     Depression     Diabetes mellitus, type 2     Diabetic polyneuropathy 2022    Essential (primary) hypertension     Exotropia of both eyes 2013    recession RSR 5.0mm w/ adj; recession LR os 5.0 w/ adj; resect MR os  4.0mm    Hearing loss     History of resection of small bowel     Hypertensive retinopathy of both eyes     Hypoglycemia     Macular degeneration     OA (osteoarthritis) of shoulder     Right    Osteoporosis     Paroxysmal atrial fibrillation     Posterior vitreous detachment of both eyes     Rhinitis     TIA (transient ischemic attack)          Past Surgical History:   Procedure Laterality Date    APPENDECTOMY      CARDIAC CATHETERIZATION      CATARACT EXTRACTION W/  INTRAOCULAR LENS IMPLANT Bilateral      SECTION, CLASSIC      CLOSURE OF LEFT ATRIAL APPENDAGE USING DEVICE N/A 2020    Procedure: Left atrial appendage closure device;  Surgeon: Abundio Curtis MD;  Location: [facility] CATH LAB;  Service: Cardiology;  Laterality: N/A;    HYSTERECTOMY      INNER EAR SURGERY      JOINT REPLACEMENT      LEFT KNEE REPLACEMENT IN  -    OOPHORECTOMY      SINUS SURGERY      STRABISMUS SURGERY  13    RSR recession 5 mm, LLR recession 5 mm and LMR resection 4mm    STRABISMUS SURGERY  2014    recess LR OD 6mm    TONSILLECTOMY      watchman surgery N/A 2020       Time Tracking:     OT Date of Treatment: 23  OT Start Time: 935  OT Stop Time: 1000  OT Total Time (min): 25 min    Billable Minutes:Evaluation 15  Therapeutic Activity 10    2023

## 2023-01-26 NOTE — ASSESSMENT & PLAN NOTE
Patient with Paroxysmal (<7 days) atrial fibrillation which is uncontrolled. Patient is currently in Afib with RVR.  [id number] Score: 4.   Anticoagulation not indicated due to s/p Watchman device implant.  She is not on any AV juanito blockers currently.    Lopressor 5mg iv x 1 now  ASA 81mg po qday

## 2023-01-26 NOTE — HPI
"Patient Jenniffer Jimenez was admitted with agitated delirium and worsening cellulitis of her extremities.  Patient was cared for at [facility] recently for this and discharged to care at [facility], where she became agitated.  Sent here for care.  Psychiatric consult was placed for confusion and agitation.  Patient was given 2 injections of olanzapine and 1 injection of lorazepam last night and placed in restraints.  She is very sedated today and doesn't waken easily to engage in conversation.  When I try to wake her, she is mumbling incoherently.  Also noted to be shaking in her extremities.  Bedside sitter states that she was talking this morning but sluggish and tired today.  While at [facility] for her recent care, she was given olanzapine as a scheduled medication daily for her agitation.  There is not documented history of mental illness.  Records state the she is "sharp" for her age.  "

## 2023-01-26 NOTE — NURSING
Pt currently sleeping, trial to dc restraints, Bilateral wrists restraints untied. Sitter remains at bedside. Will continue to monitor.

## 2023-01-26 NOTE — PLAN OF CARE
Problem: Adult Inpatient Plan of Care  Goal: Plan of Care Review  Outcome: Ongoing, Progressing  Goal: Patient-Specific Goal (Individualized)  Outcome: Ongoing, Progressing  Goal: Absence of Hospital-Acquired Illness or Injury  Outcome: Ongoing, Progressing  Goal: Optimal Comfort and Wellbeing  Outcome: Ongoing, Progressing  Goal: Readiness for Transition of Care  Outcome: Ongoing, Progressing     Problem: Impaired Wound Healing  Goal: Optimal Wound Healing  Outcome: Ongoing, Progressing     Problem: Fall Injury Risk  Goal: Absence of Fall and Fall-Related Injury  Outcome: Ongoing, Progressing     Problem: Infection  Goal: Absence of Infection Signs and Symptoms  Outcome: Ongoing, Progressing     Problem: Skin Injury Risk Increased  Goal: Skin Health and Integrity  Outcome: Ongoing, Progressing

## 2023-01-26 NOTE — ASSESSMENT & PLAN NOTE
"BNP only 183 with no clinical signs of CHF  She has listed BB as allergy - will need to clarify with family, as states, "allergy shots"  She cannot take ACE/ARB due to CKD3b and elevated K  Consider adding low dose hydralazine and nitrate if BP stays elevated    TTE on 10/8/22 showed:   The left ventricle is normal in size with concentric remodeling and normal systolic function.   The estimated ejection fraction is 55-60%.   Grade III left ventricular diastolic dysfunction.   Mild mitral regurgitation.   Normal right ventricular size with mildly reduced right ventricular systolic function.   Severe tricuspid regurgitation.   The estimated PA systolic pressure is 52 mmHg. PASP may be under-estimated due to TR severity.   Elevated central venous pressure (15 mmHg).   There is pulmonary hypertension.   Severe left atrial enlargement.      "

## 2023-01-26 NOTE — ASSESSMENT & PLAN NOTE
Renal dose all meds, avoid NSAIDs, IV dye  At baseline     Latest Reference Range & Units 01/23/23 13:00 01/24/23 08:15 01/25/23 15:32 01/25/23 21:55   Creatinine 0.5 - 1.4 mg/dL 1.5 (H) 1.1 1.1 1.1

## 2023-01-26 NOTE — ASSESSMENT & PLAN NOTE
Patient with agitated delirium.  Reports state that she has a normal functioning baseline.  Delirium likely due to illnesses, medications, or frequent change of hospitalization.  Start scheduled quetiapine in low dose 25 mg nighty to see if this may help.  Avoid benzos and anticholinergics which may worsen delirium.  Utilize haloperidol 1-2 mg IV every 6 hours PRN acute psychotic or non-redirectable agitation (QTc reviewed as consistently below 450 ms).  Utilize restraints PRN patient and staff safety.

## 2023-01-26 NOTE — PLAN OF CARE
Problem: Physical Therapy  Goal: Physical Therapy Goal  Description: Goals to be met by: 23     Patient will increase functional independence with mobility by performin. Supine to sit with Stand-by Assistance  2. Rolling to Left and Right with Stand-by Assistance  3. Sit to stand transfer with Stand-by Assistance using RW  4. Bed to chair transfer with Stand-by Assistance using Rolling Walker  5. Gait >50 feet with Stand-by Assistance using Rolling Walker   6. Wheelchair propulsion >50 feet with Stand-by Assistance using bilateral upper extremities  7. Lower extremity exercise program 2 sets x15 reps per handout, with independence    Outcome: Ongoing, Progressing     Pt ambulated a few sidesteps along bedside with min A using RW.

## 2023-01-26 NOTE — PLAN OF CARE
B/s swallow eval completed. Pt is safe to cont current regular diet with thin liquids. No further ST is warranted at this time.

## 2023-01-26 NOTE — SUBJECTIVE & OBJECTIVE
Patient History               Medical as of 1/26/2023       Past Medical History       Diagnosis Date Comments Source    Amblyopia of left eye 04/10/2013 -- Provider    Arthritis -- Facet arthropathy, Lumbosacral Provider    Atherosclerosis of aorta -- -- Provider    Cataract -- -- Provider    Central retinal vein occlusion of left eye -- -- Provider    CKD (chronic kidney disease) stage 3, GFR 30-59 ml/min -- -- Provider    Diabetes mellitus, type 2 -- -- Provider    Diabetic polyneuropathy 01/06/2022 -- Provider    Essential (primary) hypertension -- -- Provider    Exotropia of both eyes 02/06/2013 recession RSR 5.0mm w/ adj; recession LR os 5.0 w/ adj; resect MR os  4.0mm Provider    Hearing loss -- -- Provider    History of resection of small bowel -- -- Provider    Hypertensive retinopathy of both eyes -- -- Provider    Hypoglycemia -- -- Provider    Macular degeneration -- -- Provider    OA (osteoarthritis) of shoulder -- Right Provider    Osteoporosis -- -- Provider    Paroxysmal atrial fibrillation -- -- Provider    Posterior vitreous detachment of both eyes -- -- Provider    Psychiatric problem -- -- Provider    Rhinitis -- -- Provider    TIA (transient ischemic attack) -- -- Provider              Pertinent Negatives       Diagnosis Date Noted Comments Source    Angio-edema 10/24/2012 -- Provider    Asthma 10/24/2012 -- Provider    Asthma 02/24/2014 -- Provider    Basal cell carcinoma 10/20/2015 -- Provider    Cataract 10/15/2012 -- Provider    Diabetes mellitus 10/15/2012 -- Provider    Diabetic retinopathy 10/15/2012 -- Provider    Diabetic retinopathy 04/07/2021 -- Provider    Eczema 10/24/2012 -- Provider    Glaucoma 10/15/2012 -- Provider    Glaucoma 04/07/2021 -- Provider    History of psychiatric hospitalization 01/26/2023 -- Provider    Melanoma 10/20/2015 -- Provider    Recurrent upper respiratory infection (URI) 10/24/2012 -- Provider    Retinal detachment 10/15/2012 -- Provider    Sickle  cell anemia 2016 -- Provider    Sickle cell trait 2016 -- Provider    Squamous cell carcinoma 10/20/2015 -- Provider    Stroke 2014 -- Provider    Syncope and collapse 2014 -- Provider    Therapy 2023 -- Provider    Urticaria 10/24/2012 -- Provider    Uveitis 10/15/2012 -- Provider                          Surgical as of 2023       Past Surgical History       Procedure Laterality Date Comments Source    CARDIAC CATHETERIZATION -- -- -- Provider    INNER EAR SURGERY -- -- -- Provider    SINUS SURGERY -- -- -- Provider    TONSILLECTOMY -- -- -- Provider     SECTION, CLASSIC -- -- -- Provider    APPENDECTOMY -- -- -- Provider    STRABISMUS SURGERY -- 13 RSR recession 5 mm, LLR recession 5 mm and LMR resection 4mm Provider    STRABISMUS SURGERY -- 2014 recess LR OD 6mm Provider    CATARACT EXTRACTION W/  INTRAOCULAR LENS IMPLANT Bilateral -- -- Provider    HYSTERECTOMY -- -- -- Provider    OOPHORECTOMY -- -- -- Provider    JOINT REPLACEMENT -- -- LEFT KNEE REPLACEMENT IN  - Provider    CLOSURE OF LEFT ATRIAL APPENDAGE USING DEVICE N/A 2020 Procedure: Left atrial appendage closure device;  Surgeon: Abundio Curtis MD;  Location: [facility] CATH LAB;  Service: Cardiology;  Laterality: N/A; Provider    watchman surgery [Other] N/A 2020 -- Provider                          Family as of 2023       Problem Relation Name Age of Onset Comments Source    Hypertension Mother -- -- -- Provider    Hypertension Father -- -- -- Provider    Liver disease Sister -- -- -- Provider    Diabetes Sister -- -- -- Provider    Heart disease Sister -- -- -- Provider    Diabetes Brother -- -- -- Provider    Breast cancer Maternal Aunt -- -- -- Provider    No Known Problems Maternal Uncle -- -- -- Provider    No Known Problems Paternal Aunt -- -- -- Provider    No Known Problems Paternal Uncle -- -- -- Provider    No Known Problems Maternal Grandmother -- -- -- Provider    No Known  Problems Maternal Grandfather -- -- -- Provider    No Known Problems Paternal Grandmother -- -- -- Provider    No Known Problems Paternal Grandfather -- -- -- Provider    No Known Problems Daughter -- -- -- Provider    No Known Problems Son -- -- -- Provider    Heart disease Sister -- -- -- Provider    No Known Problems Son -- -- -- Provider    No Known Problems Son -- -- -- Provider    Cancer Neg Hx -- -- in first degree relatives Provider    Melanoma Neg Hx -- -- -- Provider    Psoriasis Neg Hx -- -- -- Provider    Lupus Neg Hx -- -- -- Provider    Amblyopia Neg Hx -- -- -- Provider    Blindness Neg Hx -- -- -- Provider    Cataracts Neg Hx -- -- -- Provider    Glaucoma Neg Hx -- -- -- Provider    Macular degeneration Neg Hx -- -- -- Provider    Retinal detachment Neg Hx -- -- -- Provider    Strabismus Neg Hx -- -- -- Provider    Stroke Neg Hx -- -- -- Provider    Thyroid disease Neg Hx -- -- -- Provider                  Tobacco Use as of 1/26/2023       Smoking Status Smoking Start Date Quit Date Smoking Frequency    Former -- 10/29/1982       Passive Exposure    Never      Smokeless Status Smokeless Type Smokeless Quit Date    Never -- --      Source    Provider                  Alcohol Use as of 1/26/2023       Alcohol Use Drinks/Week Alcohol/Week Comments Source    Yes 2 Shots of liquor 2.0 standard drinks rare Provider                  Drug Use as of 1/26/2023       Drug Use Types Frequency Comments Source    No -- -- -- Provider                  Sexual Activity as of 1/26/2023       Sexually Active Birth Control Partners Comments Source    Never -- -- -- Provider                  Activities of Daily Living as of 1/26/2023       Activities of Daily Living Question Response Comments Source    Patient feels they ought to cut down on drinking/drug use Not Asked -- Provider    Patient annoyed by others criticizing their drinking/drug use Not Asked -- Provider    Patient has felt bad or guilty about drinking/drug  use Not Asked -- Provider    Patient has had a drink/used drugs as an eye opener in the AM Not Asked -- Provider    Are you pregnant or think you may be? No -- Provider    Breast-feeding No -- Provider                  Social Documentation as of 1/26/2023    None               Occupational as of 1/26/2023       Occupation Employer Comments Source    retired -- -- Provider                  Socioeconomic as of 1/26/2023       Marital Status Spouse Name Number of Children Years Education Education Level Preferred Language Ethnicity Race Source     -- -- -- -- English Not  or /a White Provider                  Pertinent History       Question Response Comments    Lives with family --    Place in Birth Order other --    Lives in home --    Number of Siblings 5 --    Raised by biological parents --    Legal Involvement none --    Childhood Trauma uneventful --    Criminal History of none --    Financial Status other --    Highest Level of Education Bachelor's Degree --    Does patient have access to a firearm? No --     Service No --    Primary Leisure Activity time with family --    Spirituality actively participates in organized Tenriism --          Past Medical History:   Diagnosis Date    Amblyopia of left eye 04/10/2013    Arthritis     Facet arthropathy, Lumbosacral    Atherosclerosis of aorta     Cataract     Central retinal vein occlusion of left eye     CKD (chronic kidney disease) stage 3, GFR 30-59 ml/min     Diabetes mellitus, type 2     Diabetic polyneuropathy 01/06/2022    Essential (primary) hypertension     Exotropia of both eyes 02/06/2013    recession RSR 5.0mm w/ adj; recession LR os 5.0 w/ adj; resect MR os  4.0mm    Hearing loss     History of resection of small bowel     Hypertensive retinopathy of both eyes     Hypoglycemia     Macular degeneration     OA (osteoarthritis) of shoulder     Right    Osteoporosis     Paroxysmal atrial fibrillation     Posterior vitreous  "detachment of both eyes     Psychiatric problem     Rhinitis     TIA (transient ischemic attack)      Past Surgical History:   Procedure Laterality Date    APPENDECTOMY      CARDIAC CATHETERIZATION      CATARACT EXTRACTION W/  INTRAOCULAR LENS IMPLANT Bilateral      SECTION, CLASSIC      CLOSURE OF LEFT ATRIAL APPENDAGE USING DEVICE N/A 2020    Procedure: Left atrial appendage closure device;  Surgeon: Abundio Curtis MD;  Location: [facility] CATH LAB;  Service: Cardiology;  Laterality: N/A;    HYSTERECTOMY      INNER EAR SURGERY      JOINT REPLACEMENT      LEFT KNEE REPLACEMENT IN 2013 -    OOPHORECTOMY      SINUS SURGERY      STRABISMUS SURGERY  13    RSR recession 5 mm, LLR recession 5 mm and LMR resection 4mm    STRABISMUS SURGERY  2014    recess LR OD 6mm    TONSILLECTOMY      watchman surgery N/A 2020     Family History       Problem Relation (Age of Onset)    Breast cancer Maternal Aunt    Diabetes Sister, Brother    Heart disease Sister, Sister    Hypertension Mother, Father    Liver disease Sister    No Known Problems Maternal Uncle, Paternal Aunt, Paternal Uncle, Maternal Grandmother, Maternal Grandfather, Paternal Grandmother, Paternal Grandfather, Daughter, Son, Son, Son          Tobacco Use    Smoking status: Former     Types: Cigarettes     Quit date: 10/29/1982     Years since quittin.2     Passive exposure: Never    Smokeless tobacco: Never   Substance and Sexual Activity    Alcohol use: Yes     Alcohol/week: 2.0 standard drinks     Types: 2 Shots of liquor per week     Comment: rare    Drug use: No    Sexual activity: Never     Review of patient's allergies indicates:   Allergen Reactions    Opioids - morphine analogues Other (See Comments)     Bowel issues; bowel obstruction    Tizanidine Other (See Comments)     "Lips were numb,  Almost passed out."    Tramadol Hallucinations    Beta-blockers (beta-adrenergic blocking agts) Other (See Comments)     Can not go on beta " blockers for long period of time - due to taking allergy injections    Morphine     Opioids-meperidine and related     Ciprofloxacin Rash       No current facility-administered medications on file prior to encounter.     Current Outpatient Medications on File Prior to Encounter   Medication Sig    acetaminophen (TYLENOL) 500 MG tablet Take 1,000 mg by mouth 2 (two) times a day.    aspirin (ECOTRIN) 81 MG EC tablet Take 1 tablet (81 mg total) by mouth once daily.    azelastine (ASTELIN) 137 mcg (0.1 %) nasal spray 1 spray (137 mcg total) by Nasal route 2 (two) times daily. (Patient not taking: Reported on 1/21/2023)    diclofenac sodium (VOLTAREN) 1 % Gel Apply 2 g topically 4 (four) times daily. Use gloves to apply to right hip for 10 days    famotidine (PEPCID) 20 MG tablet Take 1 tablet (20 mg total) by mouth 2 (two) times daily. For Allergic Reaction.    fluticasone propionate (FLONASE) 50 mcg/actuation nasal spray USE 1 SPRAY IN EACH NOSTRIL ONE TIME DAILY (Patient not taking: Reported on 1/21/2023)    guaiFENesin 100 mg/5 ml (ROBITUSSIN) 100 mg/5 mL syrup Take 15 mLs by mouth once daily.    levocetirizine (XYZAL) 5 MG tablet Take 1 tablet (5 mg total) by mouth once daily. For Allergies. (Patient not taking: Reported on 1/21/2023)    LIDOcaine (LIDODERM) 5 % Place 1 patch onto the skin once daily. Remove & Discard patch within 12 hours or as directed by provider. Apply to right hip.    miconazole NITRATE 2 % (MICOTIN) 2 % top powder Apply topically 2 (two) times daily. To RLE.    OLANZapine (ZYPREXA) 2.5 MG tablet Take 1 tablet (2.5 mg total) by mouth every 8 (eight) hours as needed (non-redirectable agitation).    OLANZapine (ZYPREXA) 2.5 MG tablet Take 1 tablet (2.5 mg total) by mouth every evening.    pravastatin (PRAVACHOL) 40 MG tablet Take 1 tablet (40 mg total) by mouth once daily.    silver sulfADIAZINE 1% (SILVADENE) 1 % cream Apply to RLE skin breakdown twice daily    sodium zirconium cyclosilicate  "(LOKELMA) 5 gram packet Take 1 packet (5 g total) by mouth 2 (two) times a day. Mix entire contents of packet(s) into drinking glass containing 3 tablespoons of water; stir well and drink immediately. Add water and repeat until no powder remains to receive entire dose.    triamcinolone acetonide 0.1% (KENALOG) 0.1 % ointment Apply to entire body twice daily    vit C/E/Zn/coppr/lutein/zeaxan (OCUVITE LUTEIN AND ZEAXANTHIN ORAL) Take 1 capsule by mouth once daily. Lutien 25 mg, Zeaxanthin 5 mg    vitamin E 400 UNIT capsule Take 400 Units by mouth once daily.     Psychotherapeutics (From admission, onward)      None          Review of Systems   Unable to perform ROS: Mental status change   Strengths and Liabilities: Liability: Patient has poor health., Liability: Patient has possible cognitive impairment., Liability: Patient lacks coping skills.    Objective:     Vital Signs (Most Recent):  Temp: 98.3 °F (36.8 °C) (01/26/23 1046)  Pulse: 71 (01/26/23 1046)  Resp: 20 (01/26/23 1046)  BP: 114/69 (01/26/23 1046)  SpO2: 97 % (01/26/23 1046) Vital Signs (24h Range):  Temp:  [98 °F (36.7 °C)-99.7 °F (37.6 °C)] 98.3 °F (36.8 °C)  Pulse:  [] 71  Resp:  [18-50] 20  SpO2:  [93 %-100 %] 97 %  BP: (106-144)/(60-83) 114/69     Height: 5' 5" (165.1 cm)  Weight: 83.1 kg (183 lb 3.2 oz)  Body mass index is 30.49 kg/m².      Intake/Output Summary (Last 24 hours) at 1/26/2023 1456  Last data filed at 1/26/2023 0830  Gross per 24 hour   Intake 1459 ml   Output 400 ml   Net 1059 ml       Physical Exam  Psychiatric:      Comments: EXAMINATION    CONSTITUTIONAL  General Appearance: hospital attire    MUSCULOSKELETAL  Muscle Strength and Tone: weak  Abnormal Involuntary Movements: shaking noted to both arms  Gait and Station: not observed    PSYCHIATRIC MENTAL STATUS EXAM   Level of Consciousness: sleeping heavily  Orientation: not able to obtain  Grooming: limited  Psychomotor Behavior: sedated  Speech: incoherent mumbling  Language: " not able to obtain  Mood: not able to obtain  Affect: blunted  Thought Process: not able to obtain  Associations: not able to obtain  Thought Content: not able to obtain  Memory: not able to obtain  Attention: sleeping  Fund of Knowledge: not able to obtain  Insight: not able to obtain  Judgment: not able to obtain            Significant Labs: All pertinent labs within the past 24 hours have been reviewed.    Significant Imaging: I have reviewed all pertinent imaging results/findings within the past 24 hours.

## 2023-01-26 NOTE — ASSESSMENT & PLAN NOTE
Unfortunately both of the specimens from today are slightly hemolyzed  She has had this a documented issue from [facility] as well  Today her K was 5.3 and 5.4, which correlates with most of her other levels recently, so suspect it's correct    Will continue her prior therapy:    sodium zirconium cyclosilicate packet 10 g, 10 g, Oral, BID, FAMILIA Guajardo MD

## 2023-01-26 NOTE — ASSESSMENT & PLAN NOTE
This was attributed to Cipro, which was subsequently discontinued  The macular-papular rash seems to be fading by exam         miconazole NITRATE 2 % top powder, , Topical (Top), BID

## 2023-01-26 NOTE — ASSESSMENT & PLAN NOTE
I have given her Zyprexa 5mg IM x 2 with very little effect  She is still quite combative  She got Ativan 1mg iv x 1 in the ED, likely inducing a paradoxical effect in her case   -Avoid further Benzo use (as well as any anticholinergic meds)  Consult Psychiatry  Soft restraints if chemical intervention fails  Her QTc was normal prior to Zyprexa, repeating now after 2 doses (total 10mg, max dose)    TSH 1.6 on 1/5.  Check B12, RPR, thiamine, SMMA, NH3  Telemetry, continuous pulse ox, 24 hour bedside sitter  May have to try Haldol if she gets much worse

## 2023-01-26 NOTE — PLAN OF CARE
Problem: Adult Inpatient Plan of Care  Goal: Plan of Care Review  Outcome: Ongoing, Progressing  Flowsheets (Taken 1/26/2023 0129)  Plan of Care Reviewed With: caregiver  Goal: Patient-Specific Goal (Individualized)  Outcome: Ongoing, Progressing  Goal: Absence of Hospital-Acquired Illness or Injury  Outcome: Ongoing, Progressing  Intervention: Identify and Manage Fall Risk  Flowsheets (Taken 1/26/2023 0129)  Safety Promotion/Fall Prevention:   diversional activities provided   lighting adjusted   high risk medications identified   pulse ox   Fall Risk reviewed with patient/family   supervised activity   medications reviewed   room near unit station   /camera at bedside   bed alarm set   toileting scheduled  Intervention: Prevent Skin Injury  Flowsheets (Taken 1/26/2023 0129)  Body Position: position changed independently  Skin Protection:   adhesive use limited   skin-to-skin areas padded   tubing/devices free from skin contact   incontinence pads utilized  Intervention: Prevent and Manage VTE (Venous Thromboembolism) Risk  Flowsheets (Taken 1/26/2023 0129)  Range of Motion: ROM (range of motion) performed  Goal: Optimal Comfort and Wellbeing  Outcome: Ongoing, Progressing  Intervention: Monitor Pain and Promote Comfort  Flowsheets (Taken 1/26/2023 0129)  Pain Management Interventions: warm blanket provided  Goal: Readiness for Transition of Care  Outcome: Ongoing, Progressing     Problem: Impaired Wound Healing  Goal: Optimal Wound Healing  Outcome: Ongoing, Progressing     Problem: Fall Injury Risk  Goal: Absence of Fall and Fall-Related Injury  Outcome: Ongoing, Progressing     Problem: Restraint, Nonbehavioral (Nonviolent)  Goal: Absence of Harm or Injury  Outcome: Ongoing, Progressing     Problem: Infection  Goal: Absence of Infection Signs and Symptoms  Outcome: Ongoing, Progressing     Problem: Fall Injury Risk  Goal: Absence of Fall and Fall-Related Injury  Outcome: Ongoing, Progressing      Problem: Infection  Goal: Absence of Infection Signs and Symptoms  Outcome: Ongoing, Progressing     Problem: Restraint, Nonbehavioral (Nonviolent)  Goal: Absence of Harm or Injury  Outcome: Ongoing, Progressing

## 2023-01-26 NOTE — CONSULTS
[facility] Surg  Wound Care    Patient Name:  Jenniffer Jimenez   MRN:  [id number]  Date: 2023  Diagnosis: Delirium due to medical condition with behavioral disturbance    History:     Past Medical History:   Diagnosis Date    Amblyopia of left eye 04/10/2013    Anxiety     Arthritis     Facet arthropathy, Lumbosacral    Atherosclerosis of aorta     Cataract     Central retinal vein occlusion of left eye     CKD (chronic kidney disease) stage 3, GFR 30-59 ml/min     Depression     Diabetes mellitus, type 2     Diabetic polyneuropathy 2022    Essential (primary) hypertension     Exotropia of both eyes 2013    recession RSR 5.0mm w/ adj; recession LR os 5.0 w/ adj; resect MR os  4.0mm    Hearing loss     History of resection of small bowel     Hypertensive retinopathy of both eyes     Hypoglycemia     Macular degeneration     OA (osteoarthritis) of shoulder     Right    Osteoporosis     Paroxysmal atrial fibrillation     Posterior vitreous detachment of both eyes     Rhinitis     TIA (transient ischemic attack)        Social History     Socioeconomic History    Marital status:    Occupational History    Occupation: retired   Tobacco Use    Smoking status: Former     Types: Cigarettes     Quit date: 10/29/1982     Years since quittin.2     Passive exposure: Never    Smokeless tobacco: Never   Substance and Sexual Activity    Alcohol use: Yes     Alcohol/week: 2.0 standard drinks     Types: 2 Shots of liquor per week     Comment: rare    Drug use: No    Sexual activity: Never   Other Topics Concern    Are you pregnant or think you may be? No    Breast-feeding No     Social Determinants of Health     Financial Resource Strain: Low Risk     Difficulty of Paying Living Expenses: Not hard at all   Food Insecurity: No Food Insecurity    Worried About Running Out of Food in the Last Year: Never true    Ran Out of Food in the Last Year: Never true   Transportation Needs: No Transportation  Needs    Lack of Transportation (Medical): No    Lack of Transportation (Non-Medical): No   Physical Activity: Inactive    Days of Exercise per Week: 0 days    Minutes of Exercise per Session: 0 min   Stress: Stress Concern Present    Feeling of Stress : To some extent   Social Connections: Socially Isolated    Frequency of Communication with Friends and Family: Once a week    Frequency of Social Gatherings with Friends and Family: Once a week    Attends Sabianism Services: Never    Active Member of Clubs or Organizations: Yes    Attends Club or Organization Meetings: Never    Marital Status:    Housing Stability: Low Risk     Unable to Pay for Housing in the Last Year: No    Number of Places Lived in the Last Year: 1    Unstable Housing in the Last Year: No       Precautions:     Allergies as of 01/25/2023 - Reviewed 01/25/2023   Allergen Reaction Noted    Opioids - morphine analogues Other (See Comments) 08/15/2018    Tizanidine Other (See Comments) 04/06/2018    Tramadol Hallucinations 01/18/2013    Beta-blockers (beta-adrenergic blocking agts) Other (See Comments) 10/23/2013    Morphine  12/24/2021    Opioids-meperidine and related  01/04/2022    Ciprofloxacin Rash 12/28/2022       [facility] Assessment Details/Treatment   Consulted for altered skin integrity right leg and left forearm  A 89 year old female admitted 1/25/23 from [facility] with delirium due to medical condition with behavioral disturbance; non healing wound of right lower extremity; generalized skin eruption due to medication taken internally; chronic Afib; Stage 3b CKD; primary hypertension; normocytic anemia; hyperkalemia; chronic diastolic heart failure; pulmonary hypertension; history DM  1/26 WBC 6.36 Hgb 8.4 Hct 25.9 Alb 2.9 Weight 183 lbs  On Isoflex mattress; Tee score 10; EHOB boots  Assessment:  Photodocumentation    Right lower leg- Red with dry crusty material on dorsal foot. Draining a moderate amount serous fluid  without foul odor.   Family reports wound has been present for months.  Left forearm- Rash on forearm 8 cm area with scant serous drainage.  Treatment:  Resume local wound care to RLE with triamcinolone and Aquacel hydrofiber to absorb drainage. Will also treat left forearm with same products.  Treatment plan discussed with nursing and daughter.   Orders placed.     01/26/2023

## 2023-01-27 LAB
ALBUMIN SERPL BCP-MCNC: 2.4 G/DL (ref 3.5–5.2)
ALP SERPL-CCNC: 110 U/L (ref 55–135)
ALT SERPL W/O P-5'-P-CCNC: 10 U/L (ref 10–44)
ANION GAP SERPL CALC-SCNC: 9 MMOL/L (ref 8–16)
AST SERPL-CCNC: 11 U/L (ref 10–40)
BASOPHILS # BLD AUTO: 0.02 K/UL (ref 0–0.2)
BASOPHILS NFR BLD: 0.6 % (ref 0–1.9)
BILIRUB SERPL-MCNC: 0.4 MG/DL (ref 0.1–1)
BUN SERPL-MCNC: 25 MG/DL (ref 8–23)
CALCIUM SERPL-MCNC: 7.8 MG/DL (ref 8.7–10.5)
CHLORIDE SERPL-SCNC: 111 MMOL/L (ref 95–110)
CO2 SERPL-SCNC: 22 MMOL/L (ref 23–29)
CREAT SERPL-MCNC: 1.1 MG/DL (ref 0.5–1.4)
DIFFERENTIAL METHOD: ABNORMAL
EOSINOPHIL # BLD AUTO: 0.1 K/UL (ref 0–0.5)
EOSINOPHIL NFR BLD: 1.7 % (ref 0–8)
ERYTHROCYTE [DISTWIDTH] IN BLOOD BY AUTOMATED COUNT: 17.5 % (ref 11.5–14.5)
EST. GFR  (NO RACE VARIABLE): 48 ML/MIN/1.73 M^2
GLUCOSE SERPL-MCNC: 133 MG/DL (ref 70–110)
HCT VFR BLD AUTO: 23.1 % (ref 37–48.5)
HGB BLD-MCNC: 7.3 G/DL (ref 12–16)
IMM GRANULOCYTES # BLD AUTO: 0.02 K/UL (ref 0–0.04)
IMM GRANULOCYTES NFR BLD AUTO: 0.6 % (ref 0–0.5)
LYMPHOCYTES # BLD AUTO: 0.5 K/UL (ref 1–4.8)
LYMPHOCYTES NFR BLD: 14 % (ref 18–48)
MAGNESIUM SERPL-MCNC: 1.9 MG/DL (ref 1.6–2.6)
MCH RBC QN AUTO: 28.2 PG (ref 27–31)
MCHC RBC AUTO-ENTMCNC: 31.6 G/DL (ref 32–36)
MCV RBC AUTO: 89 FL (ref 82–98)
MONOCYTES # BLD AUTO: 0.7 K/UL (ref 0.3–1)
MONOCYTES NFR BLD: 19 % (ref 4–15)
NEUTROPHILS # BLD AUTO: 2.3 K/UL (ref 1.8–7.7)
NEUTROPHILS NFR BLD: 64.1 % (ref 38–73)
NRBC BLD-RTO: 0 /100 WBC
PHOSPHATE SERPL-MCNC: 3.3 MG/DL (ref 2.7–4.5)
PLATELET # BLD AUTO: 154 K/UL (ref 150–450)
PMV BLD AUTO: 11.1 FL (ref 9.2–12.9)
POTASSIUM SERPL-SCNC: 4.1 MMOL/L (ref 3.5–5.1)
PROT SERPL-MCNC: 5.5 G/DL (ref 6–8.4)
RBC # BLD AUTO: 2.59 M/UL (ref 4–5.4)
SODIUM SERPL-SCNC: 142 MMOL/L (ref 136–145)
WBC # BLD AUTO: 3.57 K/UL (ref 3.9–12.7)

## 2023-01-27 PROCEDURE — 25000003 PHARM REV CODE 250: Mod: HCNC | Performed by: STUDENT IN AN ORGANIZED HEALTH CARE EDUCATION/TRAINING PROGRAM

## 2023-01-27 PROCEDURE — 11000001 HC ACUTE MED/SURG PRIVATE ROOM: Mod: HCNC

## 2023-01-27 PROCEDURE — 27000221 HC OXYGEN, UP TO 24 HOURS: Mod: HCNC

## 2023-01-27 PROCEDURE — 25000003 PHARM REV CODE 250: Mod: HCNC | Performed by: INTERNAL MEDICINE

## 2023-01-27 PROCEDURE — 83735 ASSAY OF MAGNESIUM: CPT | Mod: HCNC | Performed by: INTERNAL MEDICINE

## 2023-01-27 PROCEDURE — 97530 THERAPEUTIC ACTIVITIES: CPT | Mod: HCNC

## 2023-01-27 PROCEDURE — 36415 COLL VENOUS BLD VENIPUNCTURE: CPT | Mod: HCNC | Performed by: INTERNAL MEDICINE

## 2023-01-27 PROCEDURE — 94761 N-INVAS EAR/PLS OXIMETRY MLT: CPT | Mod: HCNC

## 2023-01-27 PROCEDURE — 80053 COMPREHEN METABOLIC PANEL: CPT | Mod: HCNC | Performed by: INTERNAL MEDICINE

## 2023-01-27 PROCEDURE — 84100 ASSAY OF PHOSPHORUS: CPT | Mod: HCNC | Performed by: INTERNAL MEDICINE

## 2023-01-27 PROCEDURE — 85025 COMPLETE CBC W/AUTO DIFF WBC: CPT | Mod: HCNC | Performed by: INTERNAL MEDICINE

## 2023-01-27 RX ORDER — LANOLIN ALCOHOL/MO/W.PET/CERES
1 CREAM (GRAM) TOPICAL DAILY
Status: DISCONTINUED | OUTPATIENT
Start: 2023-01-28 | End: 2023-01-30 | Stop reason: HOSPADM

## 2023-01-27 RX ORDER — QUETIAPINE FUMARATE 25 MG/1
25 TABLET, FILM COATED ORAL NIGHTLY
Status: DISCONTINUED | OUTPATIENT
Start: 2023-01-27 | End: 2023-01-30 | Stop reason: HOSPADM

## 2023-01-27 RX ADMIN — MICONAZOLE NITRATE: 20 POWDER TOPICAL at 09:01

## 2023-01-27 RX ADMIN — MICONAZOLE NITRATE: 20 POWDER TOPICAL at 08:01

## 2023-01-27 RX ADMIN — ASPIRIN 81 MG: 81 TABLET, COATED ORAL at 08:01

## 2023-01-27 RX ADMIN — QUETIAPINE FUMARATE 25 MG: 25 TABLET ORAL at 09:01

## 2023-01-27 RX ADMIN — TRIAMCINOLONE ACETONIDE: 1 CREAM TOPICAL at 09:01

## 2023-01-27 RX ADMIN — TRIAMCINOLONE ACETONIDE: 1 CREAM TOPICAL at 08:01

## 2023-01-27 RX ADMIN — PRAVASTATIN SODIUM 40 MG: 40 TABLET ORAL at 08:01

## 2023-01-27 RX ADMIN — SODIUM ZIRCONIUM CYCLOSILICATE 10 G: 10 POWDER, FOR SUSPENSION ORAL at 08:01

## 2023-01-27 RX ADMIN — MUPIROCIN: 20 OINTMENT TOPICAL at 08:01

## 2023-01-27 RX ADMIN — POLYETHYLENE GLYCOL 3350 17 G: 17 POWDER, FOR SOLUTION ORAL at 08:01

## 2023-01-27 NOTE — SUBJECTIVE & OBJECTIVE
Interval History: Pt resting comfortably upon entering room, but easily awoken. Pt stating that she feels ok and just is sleepy. Pt is a&ox2 currently as she did not know the year, but otherwise answers questions appropriately. Discussed pts case with daughter who would like to bring pt home with home health when she is appropriate for d/c.    Review of Systems  Objective:     Vital Signs (Most Recent):  Temp: 99.2 °F (37.3 °C) (01/27/23 1558)  Pulse: 63 (01/27/23 1558)  Resp: 19 (01/27/23 1558)  BP: (!) 108/55 (01/27/23 1558)  SpO2: (!) 92 % (01/27/23 1558)   Vital Signs (24h Range):  Temp:  [97.4 °F (36.3 °C)-99.2 °F (37.3 °C)] 99.2 °F (37.3 °C)  Pulse:  [63-91] 63  Resp:  [18-19] 19  SpO2:  [92 %-100 %] 92 %  BP: (100-129)/(47-59) 108/55     Weight: 85 kg (187 lb 6.3 oz)  Body mass index is 31.18 kg/m².    Intake/Output Summary (Last 24 hours) at 1/27/2023 1602  Last data filed at 1/27/2023 1330  Gross per 24 hour   Intake 480 ml   Output 1025 ml   Net -545 ml      Physical Exam  Vitals reviewed.   Constitutional:       General: She is not in acute distress.     Appearance: She is not toxic-appearing.   HENT:      Head: Normocephalic and atraumatic.      Nose: No congestion or rhinorrhea.      Mouth/Throat:      Mouth: Mucous membranes are moist.      Pharynx: Oropharynx is clear.   Pulmonary:      Effort: Pulmonary effort is normal. No respiratory distress.   Abdominal:      Palpations: Abdomen is soft.      Tenderness: There is no abdominal tenderness.   Neurological:      General: No focal deficit present.      Mental Status: She is alert.      Comments: A&ox2. Knows name and location, but unable to tell me the year. Answers questions appropriately otherwise on my exam.       Significant Labs: All pertinent labs within the past 24 hours have been reviewed.    Significant Imaging: I have reviewed all pertinent imaging results/findings within the past 24 hours.

## 2023-01-27 NOTE — PLAN OF CARE
Afshan Duenas (Daughter)   [phone] says patient lived at [facility] on [address]  Dtr says pt was at [facility] for 3 weeks prior to discharging to [facility].  Daughter says she was there a half day then came to [facility] due to confusion.

## 2023-01-27 NOTE — PLAN OF CARE
Problem: Adult Inpatient Plan of Care  Goal: Plan of Care Review  Outcome: Ongoing, Progressing     Problem: Diabetes Comorbidity  Goal: Blood Glucose Level Within Targeted Range  Outcome: Ongoing, Progressing     Problem: Fall Injury Risk  Goal: Absence of Fall and Fall-Related Injury  Outcome: Ongoing, Progressing     Problem: Skin Injury Risk Increased  Goal: Skin Health and Integrity  Outcome: Ongoing, Progressing     Pt free from falls or any further trauma through out the shift. Prescribed medication administered. No complaint of pain. Pt on 2 lpm O2 via nasal cannula. Pt in no distress. Will continue to monitor.

## 2023-01-27 NOTE — ASSESSMENT & PLAN NOTE
I have given her Zyprexa 5mg IM x 2 with very little effect  She is still quite combative  She got Ativan 1mg iv x 1 in the ED, likely inducing a paradoxical effect in her case   -Avoid further Benzo use (as well as any anticholinergic meds)  Consult Psychiatry, recommend seroquel 25 mg nightly  Soft restraints if chemical intervention fails  Her QTc was normal prior to Zyprexa, repeating now after 2 doses (total 10mg, max dose)  TSH 1.6 on 1/5.   -improved, but pt still sleeping most of the day currently. Baseline mental status according to daughter is very sharp.    Telemetry, continuous pulse ox, 24 hour bedside sitter with continued redirection and opening of blinds during the day  Seroquel 25 mg hs

## 2023-01-27 NOTE — PT/OT/SLP PROGRESS
"Physical Therapy      Patient Name:  Jenniffer Jimenez   MRN:  [id number]    Patient not seen today for PT tx secondary to Patient unwilling to participate, Increased agitation. Pt sleeping in bed with hospital safety sitter present. PT attempted to wake pt up to offer therapy. Pt briefly opened her eyes and stated, "You have no rights to touch me.  Leave me alone.  I will slap you." Pt was unable to be re-directed to have any meaningful conversation. Pt went back to sleep. Pt was very cooperative yesterday, unfortunately agitated today. Will follow-up as appropriate.    "

## 2023-01-27 NOTE — NURSING
Nurses Note -- 4 Eyes      1/27/2023   12:04 AM      Skin assessed during: Admit      [] No Pressure Injuries Present    []Prevention Measures Documented      [x] Yes- Altered Skin Integrity Present or Discovered   [] LDA Added if Not in Epic (Describe Wound)   [x] New Altered Skin Integrity was Present on Admit and Documented in LDA   [x] Wound Image Taken    Wound Care Consulted? Yes    Attending Nurse:  Brisa Kaur RN     Second RN/Staff Member:  Marianne SHARMA charge

## 2023-01-27 NOTE — PROGRESS NOTES
"[facility] Medicine  Progress Note    Patient Name: Jenniffer Jimenez  MRN: [id number]  Patient Class: IP- Inpatient   Admission Date: 1/25/2023  Length of Stay: 2 days  Attending Physician: Sarabjit Melara III, MD  Primary Care Provider: Rubi Young DO        Subjective:     Principal Problem:Delirium due to medical condition with behavioral disturbance        HPI:  Ms. Jimenez is a 89yo lady with a past medical history of anxiety, depression, SB resection, AF s/p watchman, HTN, DM2, TIA, and chronic cellulitis/ wound of her right lower extremity    She was recently admitted to [facility] for 15 days on 1/4/23 - yesterday, 1/24/23 for, "management of worsening RLE cellulitis. Pt received IV vancomycin and ciprofloxacin initially, vancomycin discontinued. Derm consulted for worsening rash, ciprofloxacin discontinued for suspected drug reaction, and the pt was started on doxycycline. Psych consulted for worsening delirium and agitation and pt returned to mental baseline. ID consulted for worsening cellulitis. ID recommends stopping abx and treating with antifungals and steroids. JOSHUA improving s/p IVF, Cr back to baseline. Discontinued lasix and spironolactone, continue lokelma outpatient. Patient medically stable and ready for discharge to SNF. [facility] accepting."    She now returns from skilled unit due to agitated delirium.  As noted, this is not a new problem for her, as she had severe episodes recently at [facility].  She is unable to provide any meaningful history to me, as she it tangential, agitated, confused and occasionally combative.  I had a long talk with her daughter at the bedside as well.      In the ED her VS were BP (!) 144/83   Pulse 102   Temp 99.9 °F (37.7 °C) (Oral)   Resp 20   Ht 5' 5" (1.651 m)   Wt 76.7 kg (169 lb)   LMP  (LMP Unknown)   SpO2 95%   BMI 28.12 kg/m².  Labs showed WBC 6, Hg 9.3, K 5.4 (hemolyzed), Cr 1.1, Alb 2.9, , COVID/FLU NEG.    CXR " showed the heart size is mildly enlarged but not significantly changed.  Calcification is present along the wall of the aorta.  There is osseous demineralization and degenerative change.  Increased interstitial markings appears similar to previous examination.  Left lung base is not well evaluated due to patient positioning.  It is difficult to exclude pleural fluid or parenchymal disease in this region.  NC CT head showed no acute intracranial abnormalities identified, allowing for patient motion limitations.  No significant detrimental change compared to previous CT head from November 2022.  EKG showed Afib RVR, rate 115, PVCs, nonspecific ST-T changes, QTc 426.     In the ED she was treated with:  Medications   piperacillin-tazobactam (ZOSYN) 4.5 g in dextrose 5 % in water (D5W) 5 % 100 mL IVPB (MB+) (0 g Intravenous Stopped 1/25/23 1618)   vancomycin (VANCOCIN) 2,000 mg in dextrose 5 % (D5W) 500 mL IVPB (0 mg Intravenous Stopped 1/25/23 2007)   sodium chloride 0.9% bolus 1,000 mL 1,000 mL (0 mLs Intravenous Stopped 1/25/23 1638)   LORazepam injection 1 mg (1 mg Intravenous Given 1/25/23 1739)               Overview/Hospital Course:  No notes on file    Interval History: Pt resting comfortably upon entering room, but easily awoken. Pt stating that she feels ok and just is sleepy. Pt is a&ox2 currently as she did not know the year, but otherwise answers questions appropriately. Discussed pts case with daughter who would like to bring pt home with home health when she is appropriate for d/c.    Review of Systems  Objective:     Vital Signs (Most Recent):  Temp: 99.2 °F (37.3 °C) (01/27/23 1558)  Pulse: 63 (01/27/23 1558)  Resp: 19 (01/27/23 1558)  BP: (!) 108/55 (01/27/23 1558)  SpO2: (!) 92 % (01/27/23 1558)   Vital Signs (24h Range):  Temp:  [97.4 °F (36.3 °C)-99.2 °F (37.3 °C)] 99.2 °F (37.3 °C)  Pulse:  [63-91] 63  Resp:  [18-19] 19  SpO2:  [92 %-100 %] 92 %  BP: (100-129)/(47-59) 108/55     Weight: 85 kg (187  lb 6.3 oz)  Body mass index is 31.18 kg/m².    Intake/Output Summary (Last 24 hours) at 1/27/2023 1602  Last data filed at 1/27/2023 1330  Gross per 24 hour   Intake 480 ml   Output 1025 ml   Net -545 ml      Physical Exam  Vitals reviewed.   Constitutional:       General: She is not in acute distress.     Appearance: She is not toxic-appearing.   HENT:      Head: Normocephalic and atraumatic.      Nose: No congestion or rhinorrhea.      Mouth/Throat:      Mouth: Mucous membranes are moist.      Pharynx: Oropharynx is clear.   Pulmonary:      Effort: Pulmonary effort is normal. No respiratory distress.   Abdominal:      Palpations: Abdomen is soft.      Tenderness: There is no abdominal tenderness.   Neurological:      General: No focal deficit present.      Mental Status: She is alert.      Comments: A&ox2. Knows name and location, but unable to tell me the year. Answers questions appropriately otherwise on my exam.       Significant Labs: All pertinent labs within the past 24 hours have been reviewed.    Significant Imaging: I have reviewed all pertinent imaging results/findings within the past 24 hours.      Assessment/Plan:      * Delirium due to medical condition with behavioral disturbance  I have given her Zyprexa 5mg IM x 2 with very little effect  She is still quite combative  She got Ativan 1mg iv x 1 in the ED, likely inducing a paradoxical effect in her case   -Avoid further Benzo use (as well as any anticholinergic meds)  Consult Psychiatry, recommend seroquel 25 mg nightly  Soft restraints if chemical intervention fails  Her QTc was normal prior to Zyprexa, repeating now after 2 doses (total 10mg, max dose)  TSH 1.6 on 1/5.   -improved, but pt still sleeping most of the day currently. Baseline mental status according to daughter is very sharp.    Telemetry, continuous pulse ox, 24 hour bedside sitter with continued redirection and opening of blinds during the day  Seroquel 25 mg hs        History of  "diabetes mellitus  This diagnosis is listed on her chart, yet she is on no therapy for it  Check HgA1c      Pulmonary hypertension  PAP 52 mmHg  Follow up with Cardiology  May have occult BRENDAN, so consider sleep study      Generalized skin eruption due to medication taken internally  This was attributed to Cipro, which was subsequently discontinued  The macular-papular rash seems to be fading by exam       Wound care consulted, appreciate there guidance on treatment           Hyperkalemia  Unfortunately both of the specimens from today are slightly hemolyzed  She has had this a documented issue from main campus as well  On admission her K was 5.3 and 5.4, which correlates with most of her other levels recently, so suspect it's correct    -resolved  -lokelma stopped        Non-healing wound of right lower extremity  US 1/5/23 showed no evidence of deep venous thrombosis in either lower extremity  She has no fever or leukocytosis, and a normal procalcitonin  Agree with holding antibiotics for now  Consult Vascular to weigh in on venous stasis ulceration possibility   -This does not have a punched out deep, pyoderma gangrenosum appearance    ID saw her just recently at [facility].  They noted the following recommendations"  "ID previously followed and ID staff cf inflammatory component or pyoderma.  Derm defers biopsy.  Skin Cx on admit shows MRSA and patient completed 9d of vanc and doxycycline.  Suspect colonization as no active signs of infection (heat or significant ttp).  Drug rash suspected from Cipro and is now improving off abx with steroid treatment.  Has superimposed candida dermatitis under pannus and in inguinal areas.  Has been on po fluconazole.  No systemic sign of infection.  Suspect underlying chronic venous stasis and venous side insufficiency.   Plan:   As per prior recs, would monitor off of abx at this time. Complete steroids as palnned    Stop fluconazole given recent drug rash and start antifungal " "powder to affected areas   Continue local wound care to RLE and judicious leg elevation    Continue steroid cream for rash   Rec vasc Sx fu for eval of venous insufficiency in RLE"      Normocytic anemia  Slightly downtrending, but similar to baseline.. no evidence of bleeding  Checked B12, SMMA, folate, Fe studies  -low iron and iron saturation    -start on iron supplementation  -continue to monitor      Chronic diastolic heart failure  BNP only 183 with no clinical signs of CHF  She has listed BB as allergy - will need to clarify with family, as states, "allergy shots"  She cannot take ACE/ARB due to CKD3b and elevated K  Consider adding low dose hydralazine and nitrate if BP stays elevated    TTE on 10/8/22 showed:   The left ventricle is normal in size with concentric remodeling and normal systolic function.   The estimated ejection fraction is 55-60%.   Grade III left ventricular diastolic dysfunction.   Mild mitral regurgitation.   Normal right ventricular size with mildly reduced right ventricular systolic function.   Severe tricuspid regurgitation.   The estimated PA systolic pressure is 52 mmHg. PASP may be under-estimated due to TR severity.   Elevated central venous pressure (15 mmHg).   There is pulmonary hypertension.   Severe left atrial enlargement.        Stage 3b chronic kidney disease  Renal dose all meds, avoid NSAIDs, IV dye  At baseline     Latest Reference Range & Units 01/23/23 13:00 01/24/23 08:15 01/25/23 15:32 01/25/23 21:55   Creatinine 0.5 - 1.4 mg/dL 1.5 (H) 1.1 1.1 1.1        Chronic atrial fibrillation  Patient with Paroxysmal (<7 days) atrial fibrillation which is uncontrolled. Patient is currently in Afib with RVR.  [id number] Score: 4.   Anticoagulation not indicated due to s/p Watchman device implant.  She is not on any AV juanito blockers currently.    ASA 81mg po qday                      Primary hypertension  stable        VTE Risk Mitigation (From admission, onward) "         Ordered     enoxaparin injection 40 mg  Daily         01/25/23 2122     Place ANITA hose  Until discontinued         01/25/23 2122     IP VTE HIGH RISK PATIENT  Once         01/25/23 2122     Place sequential compression device  Until discontinued         01/25/23 2122                Discharge Planning   GILES:      Code Status: Full Code   Is the patient medically ready for discharge?:     Reason for patient still in hospital (select all that apply): Treatment and Consult recommendations  Discharge Plan A: Home                  Sarabjit Melara III, MD  Department of Hospital Medicine   [facility]

## 2023-01-27 NOTE — ASSESSMENT & PLAN NOTE
Slightly downtrending, but similar to baseline.. no evidence of bleeding  Checked B12, SMMA, folate, Fe studies  -low iron and iron saturation    -start on iron supplementation  -continue to monitor

## 2023-01-27 NOTE — ASSESSMENT & PLAN NOTE
This was attributed to Cipro, which was subsequently discontinued  The macular-papular rash seems to be fading by exam       Wound care consulted, appreciate there guidance on treatment

## 2023-01-27 NOTE — PLAN OF CARE
Problem: Adult Inpatient Plan of Care  Goal: Plan of Care Review  Outcome: Ongoing, Progressing  Goal: Patient-Specific Goal (Individualized)  Outcome: Ongoing, Progressing  Goal: Absence of Hospital-Acquired Illness or Injury  Outcome: Ongoing, Progressing  Goal: Optimal Comfort and Wellbeing  Outcome: Ongoing, Progressing  Goal: Readiness for Transition of Care  Outcome: Ongoing, Progressing     Problem: Impaired Wound Healing  Goal: Optimal Wound Healing  Outcome: Ongoing, Progressing     Problem: Fall Injury Risk  Goal: Absence of Fall and Fall-Related Injury  Outcome: Ongoing, Progressing     Problem: Restraint, Nonbehavioral (Nonviolent)  Goal: Absence of Harm or Injury  Outcome: Met     Problem: Skin Injury Risk Increased  Goal: Skin Health and Integrity  Outcome: Ongoing, Progressing

## 2023-01-27 NOTE — ASSESSMENT & PLAN NOTE
"US 1/5/23 showed no evidence of deep venous thrombosis in either lower extremity  She has no fever or leukocytosis, and a normal procalcitonin  Agree with holding antibiotics for now  Consult Vascular to weigh in on venous stasis ulceration possibility   -This does not have a punched out deep, pyoderma gangrenosum appearance    ID saw her just recently at [facility].  They noted the following recommendations"  "ID previously followed and ID staff cf inflammatory component or pyoderma.  Derm defers biopsy.  Skin Cx on admit shows MRSA and patient completed 9d of vanc and doxycycline.  Suspect colonization as no active signs of infection (heat or significant ttp).  Drug rash suspected from Cipro and is now improving off abx with steroid treatment.  Has superimposed candida dermatitis under pannus and in inguinal areas.  Has been on po fluconazole.  No systemic sign of infection.  Suspect underlying chronic venous stasis and venous side insufficiency.   Plan:   As per prior recs, would monitor off of abx at this time. Complete steroids as palnned    Stop fluconazole given recent drug rash and start antifungal powder to affected areas   Continue local wound care to RLE and judicious leg elevation    Continue steroid cream for rash   Rec vasc Sx fu for eval of venous insufficiency in RLE"    "

## 2023-01-28 LAB
ALBUMIN SERPL BCP-MCNC: 2.4 G/DL (ref 3.5–5.2)
ALP SERPL-CCNC: 122 U/L (ref 55–135)
ALT SERPL W/O P-5'-P-CCNC: 7 U/L (ref 10–44)
ANION GAP SERPL CALC-SCNC: 7 MMOL/L (ref 8–16)
AST SERPL-CCNC: 11 U/L (ref 10–40)
BASOPHILS # BLD AUTO: 0.02 K/UL (ref 0–0.2)
BASOPHILS NFR BLD: 0.5 % (ref 0–1.9)
BILIRUB SERPL-MCNC: 0.3 MG/DL (ref 0.1–1)
BUN SERPL-MCNC: 35 MG/DL (ref 8–23)
CALCIUM SERPL-MCNC: 8.3 MG/DL (ref 8.7–10.5)
CHLORIDE SERPL-SCNC: 109 MMOL/L (ref 95–110)
CO2 SERPL-SCNC: 24 MMOL/L (ref 23–29)
CREAT SERPL-MCNC: 1 MG/DL (ref 0.5–1.4)
DIFFERENTIAL METHOD: ABNORMAL
EOSINOPHIL # BLD AUTO: 0.1 K/UL (ref 0–0.5)
EOSINOPHIL NFR BLD: 2.3 % (ref 0–8)
ERYTHROCYTE [DISTWIDTH] IN BLOOD BY AUTOMATED COUNT: 17.4 % (ref 11.5–14.5)
EST. GFR  (NO RACE VARIABLE): 54 ML/MIN/1.73 M^2
GLUCOSE SERPL-MCNC: 147 MG/DL (ref 70–110)
HCT VFR BLD AUTO: 25.7 % (ref 37–48.5)
HGB BLD-MCNC: 8.4 G/DL (ref 12–16)
IMM GRANULOCYTES # BLD AUTO: 0.02 K/UL (ref 0–0.04)
IMM GRANULOCYTES NFR BLD AUTO: 0.5 % (ref 0–0.5)
LYMPHOCYTES # BLD AUTO: 0.9 K/UL (ref 1–4.8)
LYMPHOCYTES NFR BLD: 23.8 % (ref 18–48)
MAGNESIUM SERPL-MCNC: 1.9 MG/DL (ref 1.6–2.6)
MCH RBC QN AUTO: 28.8 PG (ref 27–31)
MCHC RBC AUTO-ENTMCNC: 32.7 G/DL (ref 32–36)
MCV RBC AUTO: 88 FL (ref 82–98)
MONOCYTES # BLD AUTO: 0.6 K/UL (ref 0.3–1)
MONOCYTES NFR BLD: 16.3 % (ref 4–15)
NEUTROPHILS # BLD AUTO: 2.2 K/UL (ref 1.8–7.7)
NEUTROPHILS NFR BLD: 56.6 % (ref 38–73)
NRBC BLD-RTO: 0 /100 WBC
PHOSPHATE SERPL-MCNC: 2.6 MG/DL (ref 2.7–4.5)
PLATELET # BLD AUTO: 182 K/UL (ref 150–450)
PMV BLD AUTO: 10.8 FL (ref 9.2–12.9)
POTASSIUM SERPL-SCNC: 4.4 MMOL/L (ref 3.5–5.1)
PROT SERPL-MCNC: 5.8 G/DL (ref 6–8.4)
RBC # BLD AUTO: 2.92 M/UL (ref 4–5.4)
RPR SER QL: NORMAL
SODIUM SERPL-SCNC: 140 MMOL/L (ref 136–145)
WBC # BLD AUTO: 3.87 K/UL (ref 3.9–12.7)

## 2023-01-28 PROCEDURE — 63600175 PHARM REV CODE 636 W HCPCS: Mod: HCNC | Performed by: INTERNAL MEDICINE

## 2023-01-28 PROCEDURE — 80053 COMPREHEN METABOLIC PANEL: CPT | Mod: HCNC | Performed by: INTERNAL MEDICINE

## 2023-01-28 PROCEDURE — 36415 COLL VENOUS BLD VENIPUNCTURE: CPT | Mod: HCNC | Performed by: INTERNAL MEDICINE

## 2023-01-28 PROCEDURE — 25000003 PHARM REV CODE 250: Mod: HCNC | Performed by: STUDENT IN AN ORGANIZED HEALTH CARE EDUCATION/TRAINING PROGRAM

## 2023-01-28 PROCEDURE — 83735 ASSAY OF MAGNESIUM: CPT | Mod: HCNC | Performed by: INTERNAL MEDICINE

## 2023-01-28 PROCEDURE — 11000001 HC ACUTE MED/SURG PRIVATE ROOM: Mod: HCNC

## 2023-01-28 PROCEDURE — 85025 COMPLETE CBC W/AUTO DIFF WBC: CPT | Mod: HCNC | Performed by: INTERNAL MEDICINE

## 2023-01-28 PROCEDURE — 84100 ASSAY OF PHOSPHORUS: CPT | Mod: HCNC | Performed by: INTERNAL MEDICINE

## 2023-01-28 PROCEDURE — 25000003 PHARM REV CODE 250: Mod: HCNC | Performed by: INTERNAL MEDICINE

## 2023-01-28 RX ADMIN — MUPIROCIN: 20 OINTMENT TOPICAL at 09:01

## 2023-01-28 RX ADMIN — POLYETHYLENE GLYCOL 3350 17 G: 17 POWDER, FOR SOLUTION ORAL at 09:01

## 2023-01-28 RX ADMIN — MICONAZOLE NITRATE: 20 POWDER TOPICAL at 09:01

## 2023-01-28 RX ADMIN — TRIAMCINOLONE ACETONIDE: 1 CREAM TOPICAL at 09:01

## 2023-01-28 RX ADMIN — TRIAMCINOLONE ACETONIDE: 1 CREAM TOPICAL at 11:01

## 2023-01-28 RX ADMIN — QUETIAPINE FUMARATE 25 MG: 25 TABLET ORAL at 09:01

## 2023-01-28 RX ADMIN — ASPIRIN 81 MG: 81 TABLET, COATED ORAL at 09:01

## 2023-01-28 RX ADMIN — FERROUS SULFATE TAB 325 MG (65 MG ELEMENTAL FE) 1 EACH: 325 (65 FE) TAB at 09:01

## 2023-01-28 RX ADMIN — PRAVASTATIN SODIUM 40 MG: 40 TABLET ORAL at 09:01

## 2023-01-28 RX ADMIN — ENOXAPARIN SODIUM 40 MG: 40 INJECTION SUBCUTANEOUS at 05:01

## 2023-01-28 NOTE — NURSING
"Pt refused 0400 vital signs. Pt became combative, threatening, and verbally abusive. She stated, "If you touch me I'll slap you you bitch."  "

## 2023-01-28 NOTE — ASSESSMENT & PLAN NOTE
I have given her Zyprexa 5mg IM x 2 with very little effect  She is still quite combative  She got Ativan 1mg iv x 1 in the ED, likely inducing a paradoxical effect in her case   -Avoid further Benzo use (as well as any anticholinergic meds)  Consult Psychiatry, recommend seroquel 25 mg nightly  Soft restraints if chemical intervention fails  Her QTc was normal prior to Zyprexa, repeating now after 2 doses (total 10mg, max dose)  TSH 1.6 on 1/5.   -improved, but pt still sleeping most of the day currently. Baseline mental status according to daughter is very sharp.    Telemetry, continuous pulse ox, 24 hour bedside sitter with continued redirection and opening of blinds during the day  Seroquel 25 mg hs. Attempting to redirect patient sleep/wake cycle in hopes of improving delirium with goal of getting patient back  Home with her daughter to do HH

## 2023-01-28 NOTE — SUBJECTIVE & OBJECTIVE
Interval History: Pt sleepy, but will wake up to verbal stimuli. Answers questions appropriately and follows some commands, but will fall back asleep in between. Educated sitter about keeping lights on during the day and attempting to wake patient up. Pt did eat breakfast for her.    Review of Systems  Objective:     Vital Signs (Most Recent):  Temp: 98.4 °F (36.9 °C) (01/28/23 0719)  Pulse: 63 (01/28/23 0719)  Resp: 18 (01/28/23 0719)  BP: (!) 121/58 (01/28/23 0719)  SpO2: (!) 93 % (01/28/23 0719)   Vital Signs (24h Range):  Temp:  [98.4 °F (36.9 °C)-99.2 °F (37.3 °C)] 98.4 °F (36.9 °C)  Pulse:  [63-83] 63  Resp:  [18-19] 18  SpO2:  [92 %-95 %] 93 %  BP: (108-126)/(55-60) 121/58     Weight: 85 kg (187 lb 6.3 oz)  Body mass index is 31.18 kg/m².    Intake/Output Summary (Last 24 hours) at 1/28/2023 0940  Last data filed at 1/28/2023 0623  Gross per 24 hour   Intake 240 ml   Output 650 ml   Net -410 ml      Physical Exam  Vitals reviewed.   Constitutional:       General: She is not in acute distress. Awakes to verbal/physical stimuli     Appearance: She is not toxic-appearing.   HENT:      Head: Normocephalic and atraumatic.      Nose: No congestion or rhinorrhea.      Mouth/Throat:      Mouth: Mucous membranes are moist.      Pharynx: Oropharynx is clear.   Pulmonary:      Effort: Pulmonary effort is normal. No respiratory distress.   Abdominal:      Palpations: Abdomen is soft.      Tenderness: There is no abdominal tenderness.   Neurological:      General: No focal deficit present.      Mental Status: She is alert.      Comments: A&ox2. Knows name and location, but unable to tell me the year. Answers questions appropriately otherwise on my exam.     Significant Labs: All pertinent labs within the past 24 hours have been reviewed.    Significant Imaging: I have reviewed all pertinent imaging results/findings within the past 24 hours.

## 2023-01-28 NOTE — ASSESSMENT & PLAN NOTE
Patient with Paroxysmal (<7 days) atrial fibrillation which is uncontrolled. Patient is currently in Afib with RVR.  [id number] Score: 4.   Anticoagulation not indicated due to s/p Watchman device implant.  She is not on any AV juanito blockers currently.    ASA 81mg po qday

## 2023-01-28 NOTE — PT/OT/SLP PROGRESS
Occupational Therapy   Treatment    Name: Jenniffer Jimenez  MRN: [id number]  Admitting Diagnosis:  Delirium due to medical condition with behavioral disturbance     The primary encounter diagnosis was Cellulitis of right lower extremity. Diagnoses of AMS (altered mental status), Chest pain, Delirium, and Hyperkalemia were also pertinent to this visit.    Recommendations:     Discharge Recommendations: nursing facility, skilled  Discharge Equipment Recommendations:   (TBD at NH)  Barriers to discharge:   (increased level of asistance, delirium, high risk for fall and decline)    Assessment:     Jenniffer Jimenez is a 89 y.o. female with a medical diagnosis of Delirium due to medical condition with behavioral disturbance.  She presents with. Performance deficits affecting function are weakness, impaired endurance, impaired self care skills, impaired functional mobility, gait instability, impaired balance, impaired cognition, decreased coordination, decreased upper extremity function, decreased lower extremity function, decreased safety awareness, decreased ROM, impaired coordination, impaired fine motor, impaired skin, impaired cardiopulmonary response to activity.     Poor progress. Pt found sleeping soundly in bed, max stimulation to arouse and increased aggression when awakened, cursing staff and threatening to spit on them if they did not leave her alone. Pt was able to be redirected by OT and sitter who was at pt's bedside  Pt very somnolent, sat EOB ~10 min, total assist for trunk control, no real engagement with tx session despite max stimulation to open her eyes,  pt assisted minimally with UE AAROM ex, eyes closed most of time.  Continue with OT POC.    Rehab Prognosis:  Fair; patient would benefit from acute skilled OT services to address these deficits and reach maximum level of function.       Plan:     Patient to be seen 3 x/week, 5 x/week to address the above listed problems via self-care/home  management, therapeutic activities, therapeutic exercises, neuromuscular re-education, cognitive retraining  Plan of Care Expires: 02/09/23  Plan of Care Reviewed with: patient    Subjective     Pain/Comfort:       Objective:     Communicated with: nurse prior to session.  Patient found HOB elevated with bed alarm, oxygen, PureWick, external pacer, telemetry, SCD, pressure relief boots (risk sitter) upon OT entry to room.    General Precautions: Standard, fall, respiratory, hearing impaired, diabetic (delirium precautions)    Orthopedic Precautions:N/A  Braces: N/A  Respiratory Status: Nasal cannula, flow 2 L/min     Occupational Performance:     Bed Mobility:    Patient completed Scooting/Bridging with dependent and 2 persons  Patient completed Supine to Sit with dependent and 2 persons  Patient completed Sit to Supine with dependent and 2 persons       Treatment & Education:  Pt assisted to EOB with Dep assist x 2, as she resisted being moved to sit up. She sat  EOB with total A for trunk control, eyes closed, max stimulation to open.  Pillows propped behind back for support as she sat and performed BUE ex as stated below.   PROM /AAROM x 10 reps shld flexion.ext and elbow flex seated EOB, little to no assist from pt, eyes closed, pt somnolent.     Patient left HOB elevated with all lines intact, call button in reach, bed alarm on, sitter notified, and sitter present    GOALS:   Multidisciplinary Problems       Occupational Therapy Goals          Problem: Occupational Therapy    Goal Priority Disciplines Outcome Interventions   Occupational Therapy Goal     OT, PT/OT Ongoing, Progressing    Description: Goals to be met by: 02/10/23     Patient will increase functional independence with ADLs by performing:    Feeding with Set-up Assistance.  UE Dressing with Minimal Assistance.  LE Dressing with Moderate Assistance.  Grooming while seated with Set-up Assistance.  Toileting from bedside commode with Minimal  Assistance for hygiene and clothing management.   Sitting at edge of bed for time required for seated grooming or seated UB there ex w/o LOB with Supervision.  Toilet transfer to bedside commode with Contact Guard Assistance.  Upper extremity exercise program x10 reps per handout, with assistance as needed.                         Time Tracking:     OT Date of Treatment: 01/27/23  OT Start Time: 1050  OT Stop Time: 1105  OT Total Time (min): 15 min    Billable Minutes:Therapeutic Activity 15  Total Time 15    OT/ROMERO: OT          1/28/2023

## 2023-01-28 NOTE — ASSESSMENT & PLAN NOTE
"US 1/5/23 showed no evidence of deep venous thrombosis in either lower extremity  She has no fever or leukocytosis, and a normal procalcitonin  Agree with holding antibiotics for now  Consult Vascular to weigh in on venous stasis ulceration possibility   -This does not have a punched out deep, pyoderma gangrenosum appearance    ID saw her just recently at [facility].  They noted the following recommendations"  "ID previously followed and ID staff cf inflammatory component or pyoderma.  Derm defers biopsy.  Skin Cx on admit shows MRSA and patient completed 9d of vanc and doxycycline.  Suspect colonization as no active signs of infection (heat or significant ttp).  Drug rash suspected from Cipro and is now improving off abx with steroid treatment.  Has superimposed candida dermatitis under pannus and in inguinal areas.  Has been on po fluconazole.  No systemic sign of infection.  Suspect underlying chronic venous stasis and venous side insufficiency.   Plan:   As per prior recs, would monitor off of abx at this time. Complete steroids as palnned    Stop fluconazole given recent drug rash and start antifungal powder to affected areas   Continue local wound care to RLE and judicious leg elevation    Continue steroid cream for rash   Rec vasc Sx fu for eval of venous insufficiency in RLE"    "

## 2023-01-28 NOTE — PROGRESS NOTES
"[facility] Medicine  Progress Note    Patient Name: Jenniffer Jimenez  MRN: [id number]  Patient Class: IP- Inpatient   Admission Date: 1/25/2023  Length of Stay: 3 days  Attending Physician: Sarabjit Melara III, MD  Primary Care Provider: Rubi Young DO        Subjective:     Principal Problem:Delirium due to medical condition with behavioral disturbance        HPI:  Ms. Jimenez is a 89yo lady with a past medical history of anxiety, depression, SB resection, AF s/p watchman, HTN, DM2, TIA, and chronic cellulitis/ wound of her right lower extremity    [place] [facility] [place]23 [place] yesterday, 1/24/23 for, "management of worsening RLE cellulitis. Pt received IV vancomycin and ciprofloxacin initially, vancomycin discontinued. Derm consulted for worsening rash, ciprofloxacin discontinued for suspected drug reaction, and the pt was started on doxycycline. Psych consulted for worsening delirium and agitation and pt returned to mental baseline. ID consulted for worsening cellulitis. ID recommends stopping abx and treating with antifungals and steroids. JOSHUA improving s/p IVF, Cr back to baseline. Discontinued lasix and spironolactone, continue lokelma outpatient. Patient medically stable and ready for discharge to SNF. [facility] accepting."    She now returns from skilled unit due to agitated delirium.  As noted, this is not a new problem for her, as she had severe episodes recently at [facility].  She is unable to provide any meaningful history to me, as she it tangential, agitated, confused and occasionally combative.  I had a long talk with her daughter at the bedside as well.      In the ED her VS were BP (!) 144/83   Pulse 102   Temp 99.9 °F (37.7 °C) (Oral)   Resp 20   Ht 5' 5" (1.651 m)   Wt 76.7 kg (169 lb)   LMP  (LMP Unknown)   SpO2 95%   BMI 28.12 kg/m².  Labs showed WBC 6, Hg 9.3, K 5.4 (hemolyzed), Cr 1.1, Alb 2.9, , COVID/FLU NEG.    CXR " showed the heart size is mildly enlarged but not significantly changed.  Calcification is present along the wall of the aorta.  There is osseous demineralization and degenerative change.  Increased interstitial markings appears similar to previous examination.  Left lung base is not well evaluated due to patient positioning.  It is difficult to exclude pleural fluid or parenchymal disease in this region.  NC CT head showed no acute intracranial abnormalities identified, allowing for patient motion limitations.  No significant detrimental change compared to previous CT head from November 2022.  EKG showed Afib RVR, rate 115, PVCs, nonspecific ST-T changes, QTc 426.     In the ED she was treated with:  Medications   piperacillin-tazobactam (ZOSYN) 4.5 g in dextrose 5 % in water (D5W) 5 % 100 mL IVPB (MB+) (0 g Intravenous Stopped 1/25/23 1618)   vancomycin (VANCOCIN) 2,000 mg in dextrose 5 % (D5W) 500 mL IVPB (0 mg Intravenous Stopped 1/25/23 2007)   sodium chloride 0.9% bolus 1,000 mL 1,000 mL (0 mLs Intravenous Stopped 1/25/23 1638)   LORazepam injection 1 mg (1 mg Intravenous Given 1/25/23 1739)               Overview/Hospital Course:  No notes on file    Interval History: Pt sleepy, but will wake up to verbal stimuli. Answers questions appropriately and follows some commands, but will fall back asleep in between. Educated sitter about keeping lights on during the day and attempting to wake patient up. Pt did eat breakfast for her.    Review of Systems  Objective:     Vital Signs (Most Recent):  Temp: 98.4 °F (36.9 °C) (01/28/23 0719)  Pulse: 63 (01/28/23 0719)  Resp: 18 (01/28/23 0719)  BP: (!) 121/58 (01/28/23 0719)  SpO2: (!) 93 % (01/28/23 0719)   Vital Signs (24h Range):  Temp:  [98.4 °F (36.9 °C)-99.2 °F (37.3 °C)] 98.4 °F (36.9 °C)  Pulse:  [63-83] 63  Resp:  [18-19] 18  SpO2:  [92 %-95 %] 93 %  BP: (108-126)/(55-60) 121/58     Weight: 85 kg (187 lb 6.3 oz)  Body mass index is 31.18 kg/m².    Intake/Output  Summary (Last 24 hours) at 1/28/2023 0902  Last data filed at 1/28/2023 0623  Gross per 24 hour   Intake 240 ml   Output 650 ml   Net -410 ml      Physical Exam  Vitals reviewed.   Constitutional:       General: She is not in acute distress. Awakes to verbal/physical stimuli     Appearance: She is not toxic-appearing.   HENT:      Head: Normocephalic and atraumatic.      Nose: No congestion or rhinorrhea.      Mouth/Throat:      Mouth: Mucous membranes are moist.      Pharynx: Oropharynx is clear.   Pulmonary:      Effort: Pulmonary effort is normal. No respiratory distress.   Abdominal:      Palpations: Abdomen is soft.      Tenderness: There is no abdominal tenderness.   Neurological:      General: No focal deficit present.      Mental Status: She is alert.      Comments: A&ox2. Knows name and location, but unable to tell me the year. Answers questions appropriately otherwise on my exam.     Significant Labs: All pertinent labs within the past 24 hours have been reviewed.    Significant Imaging: I have reviewed all pertinent imaging results/findings within the past 24 hours.      Assessment/Plan:      * Delirium due to medical condition with behavioral disturbance  I have given her Zyprexa 5mg IM x 2 with very little effect  She is still quite combative  She got Ativan 1mg iv x 1 in the ED, likely inducing a paradoxical effect in her case   -Avoid further Benzo use (as well as any anticholinergic meds)  Consult Psychiatry, recommend seroquel 25 mg nightly  Soft restraints if chemical intervention fails  Her QTc was normal prior to Zyprexa, repeating now after 2 doses (total 10mg, max dose)  TSH 1.6 on 1/5.   -improved, but pt still sleeping most of the day currently. Baseline mental status according to daughter is very sharp.    Telemetry, continuous pulse ox, 24 hour bedside sitter with continued redirection and opening of blinds during the day  Seroquel 25 mg hs. Attempting to redirect patient sleep/wake cycle  "in hopes of improving delirium with goal of getting patient back  Home with her daughter to do HH        History of diabetes mellitus  This diagnosis is listed on her chart, yet she is on no therapy for it  Checked HgA1c it is 6.0      Pulmonary hypertension  PAP 52 mmHg  Follow up with Cardiology  May have occult BRENDAN, so consider sleep study      Generalized skin eruption due to medication taken internally  This was attributed to Cipro, which was subsequently discontinued  The macular-papular rash seems to be fading by exam       Wound care consulted, appreciate there guidance on treatment           Hyperkalemia  Unfortunately both of the specimens from today are slightly hemolyzed  She has had this a documented issue from [facility] as well  On admission her K was 5.3 and 5.4, which correlates with most of her other levels recently, so suspect it's correct    -resolved  -lokelma stopped        Non-healing wound of right lower extremity  US 1/5/23 showed no evidence of deep venous thrombosis in either lower extremity  She has no fever or leukocytosis, and a normal procalcitonin  Agree with holding antibiotics for now  Consult Vascular to weigh in on venous stasis ulceration possibility   -This does not have a punched out deep, pyoderma gangrenosum appearance    ID saw her just recently at [facility].  They noted the following recommendations"  "ID previously followed and ID staff cf inflammatory component or pyoderma.  Derm defers biopsy.  Skin Cx on admit shows MRSA and patient completed 9d of vanc and doxycycline.  Suspect colonization as no active signs of infection (heat or significant ttp).  Drug rash suspected from Cipro and is now improving off abx with steroid treatment.  Has superimposed candida dermatitis under pannus and in inguinal areas.  Has been on po fluconazole.  No systemic sign of infection.  Suspect underlying chronic venous stasis and venous side insufficiency.   Plan:   As per prior recs, " "would monitor off of abx at this time. Complete steroids as palnned    Stop fluconazole given recent drug rash and start antifungal powder to affected areas   Continue local wound care to RLE and judicious leg elevation    Continue steroid cream for rash   Rec vasc Sx fu for eval of venous insufficiency in RLE"      Normocytic anemia  Slightly downtrending, but similar to baseline.. no evidence of bleeding  Checked B12, SMMA, folate, Fe studies  -low iron and iron saturation    -start on iron supplementation  -continue to monitor      Chronic diastolic heart failure  BNP only 183 with no clinical signs of CHF  She has listed BB as allergy - will need to clarify with family, as states, "allergy shots"  She cannot take ACE/ARB due to CKD3b and elevated K  Consider adding low dose hydralazine and nitrate if BP stays elevated    TTE on 10/8/22 showed:   The left ventricle is normal in size with concentric remodeling and normal systolic function.   The estimated ejection fraction is 55-60%.   Grade III left ventricular diastolic dysfunction.   Mild mitral regurgitation.   Normal right ventricular size with mildly reduced right ventricular systolic function.   Severe tricuspid regurgitation.   The estimated PA systolic pressure is 52 mmHg. PASP may be under-estimated due to TR severity.   Elevated central venous pressure (15 mmHg).   There is pulmonary hypertension.   Severe left atrial enlargement.        Stage 3b chronic kidney disease  Renal dose all meds, avoid NSAIDs, IV dye  At baseline     Latest Reference Range & Units 01/23/23 13:00 01/24/23 08:15 01/25/23 15:32 01/25/23 21:55   Creatinine 0.5 - 1.4 mg/dL 1.5 (H) 1.1 1.1 1.1        Chronic atrial fibrillation  Patient with Paroxysmal (<7 days) atrial fibrillation which is uncontrolled. Patient is currently in Afib with RVR.  [id number] Score: 4.   Anticoagulation not indicated due to s/p Watchman device implant.  She is not on any AV juanito blockers " currently.    ASA 81mg po qday                      Primary hypertension  stable        VTE Risk Mitigation (From admission, onward)         Ordered     enoxaparin injection 40 mg  Daily         01/25/23 2122     Place ANITA hose  Until discontinued         01/25/23 2122     IP VTE HIGH RISK PATIENT  Once         01/25/23 2122     Place sequential compression device  Until discontinued         01/25/23 2122                Discharge Planning   GILES:      Code Status: Full Code   Is the patient medically ready for discharge?:     Reason for patient still in hospital (select all that apply): Treatment and Consult recommendations  Discharge Plan A: Home                  Sarabjit Melara III, MD  Department of Hospital Medicine   [facility]

## 2023-01-29 LAB
BACTERIA BLD CULT: NORMAL
BACTERIA BLD CULT: NORMAL

## 2023-01-29 PROCEDURE — 97530 THERAPEUTIC ACTIVITIES: CPT | Mod: HCNC,CQ

## 2023-01-29 PROCEDURE — 25000003 PHARM REV CODE 250: Mod: HCNC | Performed by: STUDENT IN AN ORGANIZED HEALTH CARE EDUCATION/TRAINING PROGRAM

## 2023-01-29 PROCEDURE — 25000003 PHARM REV CODE 250: Mod: HCNC | Performed by: INTERNAL MEDICINE

## 2023-01-29 PROCEDURE — 11000001 HC ACUTE MED/SURG PRIVATE ROOM: Mod: HCNC

## 2023-01-29 PROCEDURE — 63600175 PHARM REV CODE 636 W HCPCS: Mod: HCNC | Performed by: INTERNAL MEDICINE

## 2023-01-29 RX ORDER — QUETIAPINE FUMARATE 25 MG/1
25 TABLET, FILM COATED ORAL NIGHTLY
Qty: 30 TABLET | Refills: 11 | Status: ON HOLD | OUTPATIENT
Start: 2023-01-29 | End: 2023-04-27 | Stop reason: HOSPADM

## 2023-01-29 RX ADMIN — MUPIROCIN: 20 OINTMENT TOPICAL at 09:01

## 2023-01-29 RX ADMIN — PRAVASTATIN SODIUM 40 MG: 40 TABLET ORAL at 08:01

## 2023-01-29 RX ADMIN — MICONAZOLE NITRATE: 20 POWDER TOPICAL at 09:01

## 2023-01-29 RX ADMIN — TRIAMCINOLONE ACETONIDE: 1 CREAM TOPICAL at 08:01

## 2023-01-29 RX ADMIN — QUETIAPINE FUMARATE 25 MG: 25 TABLET ORAL at 09:01

## 2023-01-29 RX ADMIN — FERROUS SULFATE TAB 325 MG (65 MG ELEMENTAL FE) 1 EACH: 325 (65 FE) TAB at 08:01

## 2023-01-29 RX ADMIN — ASPIRIN 81 MG: 81 TABLET, COATED ORAL at 08:01

## 2023-01-29 RX ADMIN — ENOXAPARIN SODIUM 40 MG: 40 INJECTION SUBCUTANEOUS at 05:01

## 2023-01-29 RX ADMIN — MICONAZOLE NITRATE: 20 POWDER TOPICAL at 08:01

## 2023-01-29 RX ADMIN — TRIAMCINOLONE ACETONIDE: 1 CREAM TOPICAL at 09:01

## 2023-01-29 RX ADMIN — MUPIROCIN: 20 OINTMENT TOPICAL at 08:01

## 2023-01-29 RX ADMIN — POLYETHYLENE GLYCOL 3350 17 G: 17 POWDER, FOR SOLUTION ORAL at 08:01

## 2023-01-29 NOTE — ASSESSMENT & PLAN NOTE
I have given her Zyprexa 5mg IM x 2 with very little effect  She is still quite combative  She got Ativan 1mg iv x 1 in the ED, likely inducing a paradoxical effect in her case   -Avoid further Benzo use (as well as any anticholinergic meds)  Consult Psychiatry, recommend seroquel 25 mg nightly  Soft restraints if chemical intervention fails  Her QTc was normal prior to Zyprexa, repeating now after 2 doses (total 10mg, max dose)  TSH 1.6 on 1/5.   -Started on seroquel 25 mg hs  -much more awake this morning, but did not sleep much last night according to report.     -continue to orient pt and encourage family visitation. Continue on seroquel and attempt to regulate sleep/wake cycle as best as possible. Likely Home with  tomorrow

## 2023-01-29 NOTE — PLAN OF CARE
Problem: Physical Therapy  Goal: Physical Therapy Goal  Description: Goals to be met by: 23     Patient will increase functional independence with mobility by performin. Supine to sit with Stand-by Assistance  2. Rolling to Left and Right with Stand-by Assistance  3. Sit to stand transfer with Stand-by Assistance using RW  4. Bed to chair transfer with Stand-by Assistance using Rolling Walker  5. Gait >50 feet with Stand-by Assistance using Rolling Walker   6. Wheelchair propulsion >50 feet with Stand-by Assistance using bilateral upper extremities  7. Lower extremity exercise program 2 sets x15 reps per handout, with independence    Outcome: Ongoing, Progressing    Pt able to perform bed mobility with Min A. Pt remains confused throughout treatment and needs redirection to stay on task.

## 2023-01-29 NOTE — HOSPITAL COURSE
89yo lady with a past medical history of anxiety, depression, SB resection, AF s/p watchman, HTN, DM2, TIA, and chronic cellulitis/ wound of her right lower extremity admitted on 01/25/2023 for altered mental status.  Patient was found to acute agitated delirium. CT head with   No acute intracranial abnormalities identified, CXR with no focal consolidation.  Patient was started on Seroquel nightly with improvement.  Delirium has resolved, mental status appears near baseline per daughter.  PT/OT consulted and recommend returning to skilled nursing facility.  However, daughter declined and would like patient to return back to her independent living.  Psych was consulted for further recommendations. Suspect delirium likely due to illnesses, medications, or frequent change of hospitalization. Of note, patient was recently hospitalized and was discharge to SNF prior to this admission. Patient lacks capacity based on her limited understanding of her functional capacity.  Her daughter (medical decision maker) is unfortunately making poor decision to let patient return to independent living instead of going to SNF. Psych recommended notify elderly protective Services.  However, patient daughter has stated that she has taken off a week to take care of her mother and plan on hiring a 24 hour bedside care sitter on discharge.  Discussed with  and will not contact elderly protective services at this time given daughter will be with patient on discharge and planning to hire 24 hour care.    Patient is no longer delirious.  Continues to have generalized weakness, will benefit from skilled nursing facility.  However, daughter would prefer patient go home with home health.  Patient is alert and oriented x3, complains of wounds on her bilateral upper and lower extremities, but appears to be improving.  Pt denies any fever, headaches, vision changes, chest pain, shortness of breath, palpitations, abdominal pain, nausea,  vomiting, or any diarrhea. Expressed that she wants to to go home. Patient's exam on discharge was as follow: Patient is alert and oriented, appears in no acute distress, heart with regular rate and rhythm, lungs clear to asculation with non-labored breathing, abdomen soft, and no focal deficits seen. Bilateral lower extremities without any edema or calf tenderness. Chronic wounds on bilateral LE. No bleeding or drainage. Will discharge home with home health and wound care. Will also have wound care and vascular surgery referral placed.     Patient and her daughter was counseled regarding any abnormal labs, differential diagnosis, treatment options, risk-benefit, lifestyle changes, prognosis, current condition, and medications. Patient was interactive and attentive.  Patient's and her daughter's questions were answered in a respectful and timely manner. Patient and her daughter was instructed to have patient follow-up with PCP within 1 week and to continue taking medications as prescribed.  Instructed to also follow up with wound care and vascular surgery outpatient, referrals placed. Also, extensively discussed the risks, benefits, and side effects of patient's medications. Discussed with patient about any medication changes. Patient verbalized understanding and agrees to treatment plan.  Patient is stable for discharge.  Patient has no other questions or concerns at this time.  ED precautions discussed with the patient.    Vital signs are stable. Tolerating p.o. intake without any nausea or vomiting. Afebrile for over 24 hours. Patient is in stable condition and patient and daughter has no questions or concerns. Patient will be discharge to home with home health and DME with referrals for wound care and vascular surgery. Discussed again in length that patient would benefit from SNF. Daughter verbalized understanding but decline at this time and stated that she believe it is best to have patient back home . CM/SW  to assist with discharge planning.     Vitals:    01/29/23 2315 01/30/23 0320 01/30/23 0729 01/30/23 1112   BP: (!) 162/63 (!) 155/69 (!) 140/65 123/60   BP Location: Right arm Right arm Right arm Right arm   Patient Position: Lying Lying Lying Lying   Pulse: 79 76 80 62   Resp: 18 18 19 20   Temp: 97.8 °F (36.6 °C) 97.3 °F (36.3 °C) 98.3 °F (36.8 °C) 98 °F (36.7 °C)   TempSrc: Oral Oral Oral Oral   SpO2: (!) 93% (!) 94% 97% (!) 94%   Weight:       Height:            rolling walker rolling walker

## 2023-01-29 NOTE — ASSESSMENT & PLAN NOTE
Unfortunately both of the specimens from today are slightly hemolyzed  She has had this a documented issue from [facility] as well  On admission her K was 5.3 and 5.4, which correlates with most of her other levels recently, so suspect it's correct    -resolved  -lokelma held

## 2023-01-29 NOTE — SUBJECTIVE & OBJECTIVE
Interval History: Pt states she is doing good and ready to go back home. Discussed with pts daughter who is going to come to see her mother to see if this is her baseline mental status. Pt and daughter plan for her to go home, but need until tomorrow to set up a place for pt to stay with the daughter. Do not want to return to SNF even though this is PT/OT recommendations    Review of Systems  Objective:     Vital Signs (Most Recent):  Temp: 97.4 °F (36.3 °C) (01/29/23 0730)  Pulse: 94 (01/29/23 0730)  Resp: 19 (01/29/23 0730)  BP: (!) 149/76 (01/29/23 0730)  SpO2: 98 % (01/29/23 0730)   Vital Signs (24h Range):  Temp:  [97.4 °F (36.3 °C)-99.5 °F (37.5 °C)] 97.4 °F (36.3 °C)  Pulse:  [67-94] 94  Resp:  [18-20] 19  SpO2:  [92 %-98 %] 98 %  BP: (124-149)/(58-76) 149/76     Weight: 85 kg (187 lb 6.3 oz)  Body mass index is 31.18 kg/m².    Intake/Output Summary (Last 24 hours) at 1/29/2023 1127  Last data filed at 1/29/2023 1030  Gross per 24 hour   Intake 600 ml   Output 1500 ml   Net -900 ml      Physical Exam  Vitals reviewed.   Constitutional:       General: She is not in acute distress.     Appearance: She is not toxic-appearing.   HENT:      Head: Normocephalic and atraumatic.      Nose: No congestion or rhinorrhea.      Mouth/Throat:      Mouth: Mucous membranes are moist.      Pharynx: Oropharynx is clear.   Pulmonary:      Effort: Pulmonary effort is normal. No respiratory distress.   Abdominal:      Palpations: Abdomen is soft.      Tenderness: There is no abdominal tenderness.   Neurological:      General: No focal deficit present.      Mental Status: She is alert.      Comments: A&ox3. Knows name, location and year now. Seems to be back to baseline doing pretty well. Answers questions appropriately otherwise on my exam.   Significant Labs: All pertinent labs within the past 24 hours have been reviewed.    Significant Imaging: I have reviewed all pertinent imaging results/findings within the past 24 hours.

## 2023-01-29 NOTE — ASSESSMENT & PLAN NOTE
Patient with Paroxysmal (<7 days) atrial fibrillation which is uncontrolled. Patient is currently in Afib with RVR.  [id number] Score: 4.   Anticoagulation not indicated due to s/p Watchman device implant.  She is not on any AV juanito blockers currently.    ASA 81mg po qday

## 2023-01-29 NOTE — PT/OT/SLP PROGRESS
Physical Therapy Treatment    Patient Name:  Jenniffer Jimenez   MRN:  [id number]    Recommendations:     Discharge Recommendations: nursing facility, skilled  Discharge Equipment Recommendations:  (ongoing assessment)  Barriers to discharge:  Pt pleasantly confused with decreased safety awareness    Assessment:     Jenniffer Jimenez is a 89 y.o. female admitted with a medical diagnosis of Delirium due to medical condition with behavioral disturbance.  She presents with the following impairments/functional limitations: weakness, impaired endurance, impaired sensation, impaired self care skills, impaired functional mobility, gait instability, decreased upper extremity function, decreased lower extremity function, decreased safety awareness, impaired cognition, edema, impaired skin .    Rehab Prognosis: Good; patient would benefit from acute skilled PT services to address these deficits and reach maximum level of function.    Recent Surgery: * No surgery found *      Plan:     During this hospitalization, patient to be seen 6 x/week to address the identified rehab impairments via gait training, therapeutic exercises, therapeutic activities and progress toward the following goals:    Plan of Care Expires:  01/29/23    Subjective     Chief Complaint: I don't want my cat to leave the room   Patient/Family Comments/goals: I want to be discharged  Pain/Comfort:  Pain Addressed 1: Pre-medicate for activity, Reposition, Distraction      Objective:     Communicated with nurse Carreno prior to session.  Patient found  supine with sitter and  with SCD, dahl catheter, pressure relief boots upon PT entry to room.     General Precautions: Standard, fall, respiratory, diabetic, hearing impaired  Orthopedic Precautions: N/A  Braces: N/A  Respiratory Status: Nasal cannula, flow 2 L/min     Functional Mobility:  Bed Mobility:     Rolling Left:  contact guard assistance  Rolling Right: contact guard assistance  Scooting: contact  guard assistance      AM-PAC 6 CLICK MOBILITY  Turning over in bed (including adjusting bedclothes, sheets and blankets)?: 3  Sitting down on and standing up from a chair with arms (e.g., wheelchair, bedside commode, etc.): 3  Moving from lying on back to sitting on the side of the bed?: 2  Moving to and from a bed to a chair (including a wheelchair)?: 2  Need to walk in hospital room?: 2  Climbing 3-5 steps with a railing?: 2  Basic Mobility Total Score: 14       Treatment & Education:  Pt able to perform supine therapeutic exercise x 10 reps: Heel slides, pillow squeezes, bridges.    Pt with good display of muscle strength with isolated movement. Pt unable to control compound movement at this date. Pt able to use UE to pull to HOB.      Patient left supine with all lines intact, call button in reach, bed alarm on, and sitter present..    GOALS:   Multidisciplinary Problems       Physical Therapy Goals          Problem: Physical Therapy    Goal Priority Disciplines Outcome Goal Variances Interventions   Physical Therapy Goal     PT, PT/OT Ongoing, Progressing     Description: Goals to be met by: 23     Patient will increase functional independence with mobility by performin. Supine to sit with Stand-by Assistance  2. Rolling to Left and Right with Stand-by Assistance  3. Sit to stand transfer with Stand-by Assistance using RW  4. Bed to chair transfer with Stand-by Assistance using Rolling Walker  5. Gait >50 feet with Stand-by Assistance using Rolling Walker   6. Wheelchair propulsion >50 feet with Stand-by Assistance using bilateral upper extremities  7. Lower extremity exercise program 2 sets x15 reps per handout, with independence                         Time Tracking:     PT Received On: 23  PT Start Time: 1137     PT Stop Time: 1150  PT Total Time (min): 13 min     Billable Minutes: Therapeutic Activity 13    Treatment Type: Treatment  PT/PTA: PTA     PTA Visit Number: 2023

## 2023-01-29 NOTE — NURSING
[facility] [place]  Nurses Note -- 4 Eyes      1/29/2023       Skin assessed on: Saturday      [x] No Pressure Injuries Present    [x]Prevention Measures Documented  Turning pillow  No breif only moisture wicking pad (rash on buttocks)  Heel boots in place  Mepilex on sacrum removed to air out rash and z-guard paste applied  [] Yes LDA  for Pressure Injury Previously documented     [] Yes New Pressure Injury Discovered   [] LDA for New Pressure Injury Added      Attending RN:  Lashay Hannon LPN     Second RN:  Sara FERNANDEZ

## 2023-01-29 NOTE — PROGRESS NOTES
"[facility] Medicine  Progress Note    Patient Name: Jenniffer Jimenez  MRN: [id number]  Patient Class: IP- Inpatient   Admission Date: 1/25/2023  Length of Stay: 4 days  Attending Physician: Sarabjit Melara III, MD  Primary Care Provider: Rubi Young DO        Subjective:     Principal Problem:Delirium due to medical condition with behavioral disturbance        HPI:  Ms. Jimenez is a 89yo lady with a past medical history of anxiety, depression, SB resection, AF s/p watchman, HTN, DM2, TIA, and chronic cellulitis/ wound of her right lower extremity    She was recently admitted to [facility] for 15 days on 1/4/23 - yesterday, 1/24/23 for, "management of worsening RLE cellulitis. Pt received IV vancomycin and ciprofloxacin initially, vancomycin discontinued. Derm consulted for worsening rash, ciprofloxacin discontinued for suspected drug reaction, and the pt was started on doxycycline. Psych consulted for worsening delirium and agitation and pt returned to mental baseline. ID consulted for worsening cellulitis. ID recommends stopping abx and treating with antifungals and steroids. JOSHUA improving s/p IVF, Cr back to baseline. Discontinued lasix and spironolactone, continue lokelma outpatient. Patient medically stable and ready for discharge to SNF. [facility] accepting."    She now returns from skilled unit due to agitated delirium.  As noted, this is not a new problem for her, as she had severe episodes recently at [facility].  She is unable to provide any meaningful history to me, as she it tangential, agitated, confused and occasionally combative.  I had a long talk with her daughter at the bedside as well.      In the ED her VS were BP (!) 144/83   Pulse 102   Temp 99.9 °F (37.7 °C) (Oral)   Resp 20   Ht 5' 5" (1.651 m)   Wt 76.7 kg (169 lb)   LMP  (LMP Unknown)   SpO2 95%   BMI 28.12 kg/m².  Labs showed WBC 6, Hg 9.3, K 5.4 (hemolyzed), Cr 1.1, Alb 2.9, , COVID/FLU NEG.    CXR " showed the heart size is mildly enlarged but not significantly changed.  Calcification is present along the wall of the aorta.  There is osseous demineralization and degenerative change.  Increased interstitial markings appears similar to previous examination.  Left lung base is not well evaluated due to patient positioning.  It is difficult to exclude pleural fluid or parenchymal disease in this region.  NC CT head showed no acute intracranial abnormalities identified, allowing for patient motion limitations.  No significant detrimental change compared to previous CT head from November 2022.  EKG showed Afib RVR, rate 115, PVCs, nonspecific ST-T changes, QTc 426.     In the ED she was treated with:  Medications   piperacillin-tazobactam (ZOSYN) 4.5 g in dextrose 5 % in water (D5W) 5 % 100 mL IVPB (MB+) (0 g Intravenous Stopped 1/25/23 1618)   vancomycin (VANCOCIN) 2,000 mg in dextrose 5 % (D5W) 500 mL IVPB (0 mg Intravenous Stopped 1/25/23 2007)   sodium chloride 0.9% bolus 1,000 mL 1,000 mL (0 mLs Intravenous Stopped 1/25/23 1638)   LORazepam injection 1 mg (1 mg Intravenous Given 1/25/23 1739)               Overview/Hospital Course:  No notes on file    Interval History: Pt states she is doing good and ready to go back home. Discussed with pts daughter who is going to come to see her mother to see if this is her baseline mental status. Pt and daughter plan for her to go home, but need until tomorrow to set up a place for pt to stay with the daughter. Do not want to return to SNF even though this is PT/OT recommendations    Review of Systems  Objective:     Vital Signs (Most Recent):  Temp: 97.4 °F (36.3 °C) (01/29/23 0730)  Pulse: 94 (01/29/23 0730)  Resp: 19 (01/29/23 0730)  BP: (!) 149/76 (01/29/23 0730)  SpO2: 98 % (01/29/23 0730)   Vital Signs (24h Range):  Temp:  [97.4 °F (36.3 °C)-99.5 °F (37.5 °C)] 97.4 °F (36.3 °C)  Pulse:  [67-94] 94  Resp:  [18-20] 19  SpO2:  [92 %-98 %] 98 %  BP: (124-149)/(58-76)  149/76     Weight: 85 kg (187 lb 6.3 oz)  Body mass index is 31.18 kg/m².    Intake/Output Summary (Last 24 hours) at 1/29/2023 1127  Last data filed at 1/29/2023 1030  Gross per 24 hour   Intake 600 ml   Output 1500 ml   Net -900 ml      Physical Exam  Vitals reviewed.   Constitutional:       General: She is not in acute distress.     Appearance: She is not toxic-appearing.   HENT:      Head: Normocephalic and atraumatic.      Nose: No congestion or rhinorrhea.      Mouth/Throat:      Mouth: Mucous membranes are moist.      Pharynx: Oropharynx is clear.   Pulmonary:      Effort: Pulmonary effort is normal. No respiratory distress.   Abdominal:      Palpations: Abdomen is soft.      Tenderness: There is no abdominal tenderness.   Neurological:      General: No focal deficit present.      Mental Status: She is alert.      Comments: A&ox3. Knows name, location and year now. Seems to be back to baseline doing pretty well. Answers questions appropriately otherwise on my exam.   Significant Labs: All pertinent labs within the past 24 hours have been reviewed.    Significant Imaging: I have reviewed all pertinent imaging results/findings within the past 24 hours.      Assessment/Plan:      * Delirium due to medical condition with behavioral disturbance  I have given her Zyprexa 5mg IM x 2 with very little effect  She is still quite combative  She got Ativan 1mg iv x 1 in the ED, likely inducing a paradoxical effect in her case   -Avoid further Benzo use (as well as any anticholinergic meds)  Consult Psychiatry, recommend seroquel 25 mg nightly  Soft restraints if chemical intervention fails  Her QTc was normal prior to Zyprexa, repeating now after 2 doses (total 10mg, max dose)  TSH 1.6 on 1/5.   -Started on seroquel 25 mg hs  -much more awake this morning, but did not sleep much last night according to report.     -continue to orient pt and encourage family visitation. Continue on seroquel and attempt to regulate  "sleep/wake cycle as best as possible. Likely Home with  tomorrow    History of diabetes mellitus  This diagnosis is listed on her chart, yet she is on no therapy for it  Checked HgA1c it is 6.0      Pulmonary hypertension  PAP 52 mmHg  Follow up with Cardiology  May have occult BRENDAN, so consider sleep study      Generalized skin eruption due to medication taken internally  This was attributed to Cipro, which was subsequently discontinued  The macular-papular rash seems to be fading by exam       Wound care consulted, appreciate there guidance on treatment           Hyperkalemia  Unfortunately both of the specimens from today are slightly hemolyzed  She has had this a documented issue from [facility] as well  On admission her K was 5.3 and 5.4, which correlates with most of her other levels recently, so suspect it's correct    -resolved  -lokelma held        Non-healing wound of right lower extremity  US 1/5/23 showed no evidence of deep venous thrombosis in either lower extremity  She has no fever or leukocytosis, and a normal procalcitonin  Agree with holding antibiotics for now  Consult Vascular to weigh in on venous stasis ulceration possibility   -This does not have a punched out deep, pyoderma gangrenosum appearance    ID saw her just recently at [facility].  They noted the following recommendations"  "ID previously followed and ID staff cf inflammatory component or pyoderma.  Derm defers biopsy.  Skin Cx on admit shows MRSA and patient completed 9d of vanc and doxycycline.  Suspect colonization as no active signs of infection (heat or significant ttp).  Drug rash suspected from Cipro and is now improving off abx with steroid treatment.  Has superimposed candida dermatitis under pannus and in inguinal areas.  Has been on po fluconazole.  No systemic sign of infection.  Suspect underlying chronic venous stasis and venous side insufficiency.   Plan:   As per prior recs, would monitor off of abx at this time. " "Complete steroids as palnned    Stop fluconazole given recent drug rash and start antifungal powder to affected areas   Continue local wound care to RLE and judicious leg elevation    Continue steroid cream for rash   Rec vasc Sx fu for eval of venous insufficiency in RLE"      Normocytic anemia  Slightly downtrending, but similar to baseline.. no evidence of bleeding  Checked B12, SMMA, folate, Fe studies  -low iron and iron saturation    -start on iron supplementation  -continue to monitor      Chronic diastolic heart failure  BNP only 183 with no clinical signs of CHF  She has listed BB as allergy - will need to clarify with family, as states, "allergy shots"  She cannot take ACE/ARB due to CKD3b and elevated K  Consider adding low dose hydralazine and nitrate if BP stays elevated    TTE on 10/8/22 showed:   The left ventricle is normal in size with concentric remodeling and normal systolic function.   The estimated ejection fraction is 55-60%.   Grade III left ventricular diastolic dysfunction.   Mild mitral regurgitation.   Normal right ventricular size with mildly reduced right ventricular systolic function.   Severe tricuspid regurgitation.   The estimated PA systolic pressure is 52 mmHg. PASP may be under-estimated due to TR severity.   Elevated central venous pressure (15 mmHg).   There is pulmonary hypertension.   Severe left atrial enlargement.        Stage 3b chronic kidney disease  Renal dose all meds, avoid NSAIDs, IV dye  At baseline     Latest Reference Range & Units 01/23/23 13:00 01/24/23 08:15 01/25/23 15:32 01/25/23 21:55   Creatinine 0.5 - 1.4 mg/dL 1.5 (H) 1.1 1.1 1.1        Chronic atrial fibrillation  Patient with Paroxysmal (<7 days) atrial fibrillation which is uncontrolled. Patient is currently in Afib with RVR.  [id number] Score: 4.   Anticoagulation not indicated due to s/p Watchman device implant.  She is not on any AV juanito blockers currently.    ASA 81mg po qday      "                 Primary hypertension  stable        VTE Risk Mitigation (From admission, onward)         Ordered     enoxaparin injection 40 mg  Daily         01/25/23 2122     Place ANITA hose  Until discontinued         01/25/23 2122     IP VTE HIGH RISK PATIENT  Once         01/25/23 2122     Place sequential compression device  Until discontinued         01/25/23 2122                Discharge Planning   GILES:      Code Status: Full Code   Is the patient medically ready for discharge?:     Reason for patient still in hospital (select all that apply): Treatment, PT / OT recommendations and Pending disposition  Discharge Plan A: Home                  Sarabjit Melara III, MD  Department of Hospital Medicine   [facility]

## 2023-01-30 VITALS
DIASTOLIC BLOOD PRESSURE: 60 MMHG | OXYGEN SATURATION: 94 % | WEIGHT: 187.38 LBS | RESPIRATION RATE: 20 BRPM | HEART RATE: 62 BPM | BODY MASS INDEX: 31.22 KG/M2 | TEMPERATURE: 98 F | HEIGHT: 65 IN | SYSTOLIC BLOOD PRESSURE: 123 MMHG

## 2023-01-30 PROCEDURE — 25000003 PHARM REV CODE 250: Mod: HCNC | Performed by: STUDENT IN AN ORGANIZED HEALTH CARE EDUCATION/TRAINING PROGRAM

## 2023-01-30 PROCEDURE — 25000003 PHARM REV CODE 250: Mod: HCNC | Performed by: INTERNAL MEDICINE

## 2023-01-30 PROCEDURE — 99233 PR SUBSEQUENT HOSPITAL CARE,LEVL III: ICD-10-PCS | Mod: HCNC,,, | Performed by: PSYCHIATRY & NEUROLOGY

## 2023-01-30 PROCEDURE — 99233 SBSQ HOSP IP/OBS HIGH 50: CPT | Mod: HCNC,,, | Performed by: PSYCHIATRY & NEUROLOGY

## 2023-01-30 PROCEDURE — 63600175 PHARM REV CODE 636 W HCPCS: Mod: HCNC | Performed by: INTERNAL MEDICINE

## 2023-01-30 RX ADMIN — ONDANSETRON HYDROCHLORIDE 4 MG: 2 SOLUTION INTRAMUSCULAR; INTRAVENOUS at 02:01

## 2023-01-30 RX ADMIN — ASPIRIN 81 MG: 81 TABLET, COATED ORAL at 08:01

## 2023-01-30 RX ADMIN — POLYETHYLENE GLYCOL 3350 17 G: 17 POWDER, FOR SOLUTION ORAL at 08:01

## 2023-01-30 RX ADMIN — MICONAZOLE NITRATE: 20 POWDER TOPICAL at 08:01

## 2023-01-30 RX ADMIN — FERROUS SULFATE TAB 325 MG (65 MG ELEMENTAL FE) 1 EACH: 325 (65 FE) TAB at 08:01

## 2023-01-30 RX ADMIN — PRAVASTATIN SODIUM 40 MG: 40 TABLET ORAL at 08:01

## 2023-01-30 RX ADMIN — TRIAMCINOLONE ACETONIDE: 1 CREAM TOPICAL at 09:01

## 2023-01-30 RX ADMIN — MUPIROCIN: 20 OINTMENT TOPICAL at 08:01

## 2023-01-30 NOTE — SUBJECTIVE & OBJECTIVE
"Interval History: improved; more awake    Family History       Problem Relation (Age of Onset)    Breast cancer Maternal Aunt    Diabetes Sister, Brother    Heart disease Sister, Sister    Hypertension Mother, Father    Liver disease Sister    No Known Problems Maternal Uncle, Paternal Aunt, Paternal Uncle, Maternal Grandmother, Maternal Grandfather, Paternal Grandmother, Paternal Grandfather, Daughter, Son, Son, Son          Tobacco Use    Smoking status: Former     Types: Cigarettes     Quit date: 10/29/1982     Years since quittin.2     Passive exposure: Never    Smokeless tobacco: Never   Substance and Sexual Activity    Alcohol use: Yes     Alcohol/week: 2.0 standard drinks     Types: 2 Shots of liquor per week     Comment: rare    Drug use: No    Sexual activity: Never     Psychotherapeutics (From admission, onward)      Start     Stop Route Frequency Ordered    23  QUEtiapine tablet 25 mg         -- Oral Nightly 23 1127             Review of Systems   Constitutional:  Positive for fatigue.   Respiratory:  Negative for shortness of breath.    Cardiovascular:  Negative for chest pain.   Musculoskeletal:  Positive for myalgias.   Neurological:  Positive for weakness.   Psychiatric/Behavioral:  Negative for dysphoric mood, hallucinations, sleep disturbance and suicidal ideas. The patient is not nervous/anxious.    Objective:     Vital Signs (Most Recent):  Temp: 98 °F (36.7 °C) (23 1112)  Pulse: 62 (23 1112)  Resp: 20 (23 1112)  BP: 123/60 (23 1112)  SpO2: (!) 94 % (23 1112)   Vital Signs (24h Range):  Temp:  [97.3 °F (36.3 °C)-98.3 °F (36.8 °C)] 98 °F (36.7 °C)  Pulse:  [62-88] 62  Resp:  [18-20] 20  SpO2:  [93 %-97 %] 94 %  BP: (123-185)/(60-79) 123/60     Height: 5' 5" (165.1 cm)  Weight: 85 kg (187 lb 6.3 oz)  Body mass index is 31.18 kg/m².      Intake/Output Summary (Last 24 hours) at 2023 1440  Last data filed at 2023 0800  Gross per 24 hour " "  Intake 477 ml   Output 150 ml   Net 327 ml       Physical Exam  Psychiatric:      Comments: EXAMINATION    CONSTITUTIONAL  General Appearance: hospital attire    MUSCULOSKELETAL  Muscle Strength and Tone: not tested  Abnormal Involuntary Movements: none noted  Gait and Station: not observed    PSYCHIATRIC MENTAL STATUS EXAM   Level of Consciousness: awake and alert  Orientation: name, "[facility]", year only  Grooming: limited  Psychomotor Behavior: normal  Speech: normal volume, slightly pressured  Language: no abnormalities  Mood: "good"  Affect: blunted/confused  Thought Process: tangential  Associations: intact but can be loose  Thought Content: denies suicidal/homicidal/psychosis  Memory: intact more to remote, somewhat to recent; difficult to test  Attention: distracted  Fund of Knowledge: simple to conversation  Insight: limited into her medical problems  Judgment: limited into understanding of abilities            Significant Labs: All pertinent labs within the past 24 hours have been reviewed.    Significant Imaging: I have reviewed all pertinent imaging results/findings within the past 24 hours.  "

## 2023-01-30 NOTE — PROGRESS NOTES
"[facility] - Med Surg  Psychiatry  Progress Note    Patient Name: Jenniffer Jimenez  MRN: [id number]   Code Status: Full Code  Admission Date: 1/25/2023  Hospital Length of Stay: 5 days  Expected Discharge Date: 1/30/2023  Attending Physician: No att. providers found  Primary Care Provider: Rubi Young DO    Current Legal Status: Uncontested    Patient information was obtained from patient, ER records and chart review, nursing.     --------------------------------------------------------------------------------------------------------------------  Subjective:     Patient is a 89 y.o., female, presents with:    Principal Problem:Delirium due to medical condition with behavioral disturbance    Chief Complaint: confusion    HPI: Patient Jenniffer Jimenez was admitted with agitated delirium and worsening cellulitis of her extremities.  Patient was cared for at [facility] recently for this and discharged to care at [facility], where she became agitated.  Sent here for care.  Psychiatric consult was placed for confusion and agitation.  Patient was given 2 injections of olanzapine and 1 injection of lorazepam last night and placed in restraints.  She is very sedated today and doesn't waken easily to engage in conversation.  When I try to wake her, she is mumbling incoherently.  Also noted to be shaking in her extremities.  Bedside sitter states that she was talking this morning but sluggish and tired today.  While at [facility] for her recent care, she was given olanzapine as a scheduled medication daily for her agitation.  There is not documented history of mental illness.  Records state the she is "sharp" for her age.      Hospital Course: 01/30/2023 - Patient seen in her room with daughter in background.  Patient is pleasant, slightly confused, and repetitive in her comments.  She says that she is ready to go home to [facility].  She is oriented to name, "[facility]", and year.  She is still " "mildly confused but orients easily.  She repeats a lot of her stories and is fixated on PT.  Daughter says that she is not going to allow mother to go back to SNF and is going to move her back into [facility], independent senior living.  Says that she is not going to "wipe her vagina or her rear end" because she "is not a medical person".  Told daughter that patient will need 24 hour care and is not able to walk to restroom.  Daughter leans over and asks patient "can you walk to the restroom" and patient replies "yes".  Daughter says that she will have home health bathe and care for her needs every other day at the senior living center.  Told daughter again that patient has not been able to walk or is physically able to meet demands to live alone.  Daughter disagrees.  Daughter also says that she is not going to rub a medical cream on patient and she can do it herself.  Nursing reports better sleep, not as sedated during day.  Still not working with therapy.      Interval History: improved; more awake    Family History       Problem Relation (Age of Onset)    Breast cancer Maternal Aunt    Diabetes Sister, Brother    Heart disease Sister, Sister    Hypertension Mother, Father    Liver disease Sister    No Known Problems Maternal Uncle, Paternal Aunt, Paternal Uncle, Maternal Grandmother, Maternal Grandfather, Paternal Grandmother, Paternal Grandfather, Daughter, Son, Son, Son          Tobacco Use    Smoking status: Former     Types: Cigarettes     Quit date: 10/29/1982     Years since quittin.2     Passive exposure: Never    Smokeless tobacco: Never   Substance and Sexual Activity    Alcohol use: Yes     Alcohol/week: 2.0 standard drinks     Types: 2 Shots of liquor per week     Comment: rare    Drug use: No    Sexual activity: Never     Psychotherapeutics (From admission, onward)      Start     Stop Route Frequency Ordered    23 2100  QUEtiapine tablet 25 mg         -- Oral Nightly 23 " "1127             Review of Systems   Constitutional:  Positive for fatigue.   Respiratory:  Negative for shortness of breath.    Cardiovascular:  Negative for chest pain.   Musculoskeletal:  Positive for myalgias.   Neurological:  Positive for weakness.   Psychiatric/Behavioral:  Negative for dysphoric mood, hallucinations, sleep disturbance and suicidal ideas. The patient is not nervous/anxious.    Objective:     Vital Signs (Most Recent):  Temp: 98 °F (36.7 °C) (01/30/23 1112)  Pulse: 62 (01/30/23 1112)  Resp: 20 (01/30/23 1112)  BP: 123/60 (01/30/23 1112)  SpO2: (!) 94 % (01/30/23 1112)   Vital Signs (24h Range):  Temp:  [97.3 °F (36.3 °C)-98.3 °F (36.8 °C)] 98 °F (36.7 °C)  Pulse:  [62-88] 62  Resp:  [18-20] 20  SpO2:  [93 %-97 %] 94 %  BP: (123-185)/(60-79) 123/60     Height: 5' 5" (165.1 cm)  Weight: 85 kg (187 lb 6.3 oz)  Body mass index is 31.18 kg/m².      Intake/Output Summary (Last 24 hours) at 1/30/2023 1440  Last data filed at 1/30/2023 0800  Gross per 24 hour   Intake 477 ml   Output 150 ml   Net 327 ml       Physical Exam  Psychiatric:      Comments: EXAMINATION    CONSTITUTIONAL  General Appearance: hospital attire    MUSCULOSKELETAL  Muscle Strength and Tone: not tested  Abnormal Involuntary Movements: none noted  Gait and Station: not observed    PSYCHIATRIC MENTAL STATUS EXAM   Level of Consciousness: awake and alert  Orientation: name, "[facility]", year only  Grooming: limited  Psychomotor Behavior: normal  Speech: normal volume, slightly pressured  Language: no abnormalities  Mood: "good"  Affect: blunted/confused  Thought Process: tangential  Associations: intact but can be loose  Thought Content: denies suicidal/homicidal/psychosis  Memory: intact more to remote, somewhat to recent; difficult to test  Attention: distracted  Fund of Knowledge: simple to conversation  Insight: limited into her medical problems  Judgment: limited into understanding of abilities            Significant Labs: All " "pertinent labs within the past 24 hours have been reviewed.    Significant Imaging: I have reviewed all pertinent imaging results/findings within the past 24 hours.       Scheduled Medications:   aspirin  81 mg Oral Daily    enoxaparin  40 mg Subcutaneous Daily    ferrous sulfate  1 tablet Oral Daily    miconazole NITRATE 2 %   Topical (Top) BID    mupirocin   Nasal BID    polyethylene glycol  17 g Oral Daily    pravastatin  40 mg Oral Daily    QUEtiapine  25 mg Oral QHS    triamcinolone acetonide 0.1%   Topical (Top) BID       PRN Medications:  acetaminophen, albuterol-ipratropium, aluminum-magnesium hydroxide-simethicone, dextrose 10%, dextrose 10%, glucagon (human recombinant), glucose, glucose, naloxone, ondansetron, senna-docusate 8.6-50 mg, simethicone, sodium chloride 0.9%    Review of patient's allergies indicates:   Allergen Reactions    Opioids - morphine analogues Other (See Comments)     Bowel issues; bowel obstruction    Tizanidine Other (See Comments)     "Lips were numb,  Almost passed out."    Tramadol Hallucinations    Beta-blockers (beta-adrenergic blocking agts) Other (See Comments)     Can not go on beta blockers for long period of time - due to taking allergy injections    Morphine     Opioids-meperidine and related     Ciprofloxacin Rash       Assessment/Plan:     * Delirium due to medical condition with behavioral disturbance  Patient with agitated delirium.  Reports state that she has a normal functioning baseline.  Delirium likely due to illnesses, medications, or frequent change of hospitalization.  Start scheduled quetiapine in low dose 25 mg nighty to see if this may help.  Avoid benzos and anticholinergics which may worsen delirium.  Utilize haloperidol 1-2 mg IV every 6 hours PRN acute psychotic or non-redirectable agitation (QTc reviewed as consistently below 450 ms).  Utilize restraints PRN patient and staff safety.    01/30/23 - Patient is no longer delirious.  " Continue quetiapine for a couple more nights to reinforce the day/night cycle.  No need for psychiatric medications after that.  She lacks capacity based on her limited understanding of her functional capacity.  She thinks that she can do more than she has physically shown.  Her daughter (medical decision maker) is unfortunately making a poor decision to let patient return to independent living without 24 hour bedside care.  Patient has not shown that she can go to the restroom, get food for herself, or take medications on her own.  If she is discharged to daughter's care, would be appropriate to notify elderly protective services to check on patient and make sure that she is properly being cared for.  Patient would be best supported in SNF or home with 24 hour care.           Need for Continued Hospitalization:  No need for inpatient psychiatric hospitalization. Continue medical care as per the primary team.    Anticipated Disposition:  Skilled Nursing Facility    Total time:  35 with greater than 50% of this time spent in counseling and/or coordination of care.       Noel Robles MD   Psychiatry  [facility] - Med Surg

## 2023-01-30 NOTE — PLAN OF CARE
[facility] - Med Surg  Discharge Reassessment    Primary Care Provider: Rubi Young DO    Expected Discharge Date: 1/30/2023    Pt discharging today. CM spoke to pt's daughter, Afshan. Pt's daughter stated she doesn't want pt to return to [facility] and would like home health. Pt's daughter would like [facility]. CM sent referral to [facility].    CM notified Lucinaa with [facility]. CM notified DME that PT/OT requested the following: hospital bed, wheelchair and bedside commode.    Reassessment (most recent)       Discharge Reassessment - 01/30/23 1234          Discharge Reassessment    Assessment Type Discharge Planning Reassessment (P)      Did the patient's condition or plan change since previous assessment? Yes (P)      Discharge Plan discussed with: Adult children (P)      Discharge Plan A Home Health (P)      Discharge Plan B Home (P)      DME Needed Upon Discharge  bedside commode;walker, rolling;hospital bed (P)      Discharge Barriers Identified None (P)      Why the patient remains in the hospital Requires continued medical care (P)         Post-Acute Status    Post-Acute Authorization Home Health (P)      Home Health Status Pending post-acute provider review/more information requested (P)      Coverage [employer] (P)      Patient choice form signed by patient/caregiver List from System Post-Acute Care (P)      Discharge Delays Post-Acute Set-up (P)                         When would you like to schedule peer to peer?  You have no overbooks tomorrow

## 2023-01-30 NOTE — PLAN OF CARE
01/30/23 1231   Post-Acute Status   Post-Acute Authorization Home Health   Home Health Status Pending post-acute provider review/more information requested   Coverage [employer]   Patient choice form signed by patient/caregiver List from System Post-Acute Care   Discharge Delays (!) Post-Acute Set-up   Discharge Plan   Discharge Plan A Home Health   Discharge Plan B Home     Referral sent to [facility].

## 2023-01-30 NOTE — PROGRESS NOTES
Unable schedule a hospital follow up. Micaela () stated that Dr. Young office will call patient to schedule a hospital follow up.

## 2023-01-30 NOTE — HOSPITAL COURSE
"01/30/2023 - Patient seen in her room with daughter in background.  Patient is pleasant, slightly confused, and repetitive in her comments.  She says that she is ready to go home to [facility].  She is oriented to name, "[facility]", and year.  She is still mildly confused but orients easily.  She repeats a lot of her stories and is fixated on PT.  Daughter says that she is not going to allow mother to go back to SNF and is going to move her back into [facility], independent senior living.  Says that she is not going to "wipe her vagina or her rear end" because she "is not a medical person".  Told daughter that patient will need 24 hour care and is not able to walk to restroom.  Daughter leans over and asks patient "can you walk to the restroom" and patient replies "yes".  Daughter says that she will have home health bathe and care for her needs every other day at the senior living center.  Told daughter again that patient has not been able to walk or is physically able to meet demands to live alone.  Daughter disagrees.  Daughter also says that she is not going to rub a medical cream on patient and she can do it herself.  Nursing reports better sleep, not as sedated during day.  Still not working with therapy.  "

## 2023-01-30 NOTE — NURSING
Received call from telemetry monitoring tech reporting patient had a 9 beat run of vtach. Observed patient sitting up in bed on phone. NAD. Notified Dr. Melara.

## 2023-01-30 NOTE — ASSESSMENT & PLAN NOTE
Patient with agitated delirium.  Reports state that she has a normal functioning baseline.  Delirium likely due to illnesses, medications, or frequent change of hospitalization.  Start scheduled quetiapine in low dose 25 mg nighty to see if this may help.  Avoid benzos and anticholinergics which may worsen delirium.  Utilize haloperidol 1-2 mg IV every 6 hours PRN acute psychotic or non-redirectable agitation (QTc reviewed as consistently below 450 ms).  Utilize restraints PRN patient and staff safety.    01/30/23 - Patient is no longer delirious.  Continue quetiapine for a couple more nights to reinforce the day/night cycle.  No need for psychiatric medications after that.  She lacks capacity based on her limited understanding of her functional capacity.  She thinks that she can do more than she has physically shown.  Her daughter (medical decision maker) is unfortunately making a poor decision to let patient return to independent living without 24 hour bedside care.  Patient has not shown that she can go to the restroom, get food for herself, or take medications on her own.  If she is discharged to daughter's care, would be appropriate to notify elderly protective services to check on patient and make sure that she is properly being cared for.  Patient would be best supported in SNF or home with 24 hour care.

## 2023-01-30 NOTE — PLAN OF CARE
01/30/23 1335   Medicare Message   Important Message from Medicare regarding Discharge Appeal Rights Given to patient/caregiver;Explained to patient/caregiver;Signed/date by patient/caregiver   Date IMM was signed 01/30/23   Time IMM was signed 132

## 2023-01-30 NOTE — DISCHARGE SUMMARY
"[facility] Medicine  Discharge Summary      Patient Name: Jenniffer Jimenez  MRN: [id number]  NANCY: [id number]  Patient Class: IP- Inpatient  Admission Date: 1/25/2023  Hospital Length of Stay: 5 days  Discharge Date and Time: 1/30/2023  2:38 PM  Attending Physician: No att. providers found   Discharging Provider: Jorge Mcclure DO  Primary Care Provider: Rubi Young DO    Primary Care Team: Networked reference to record PCT     HPI:   Ms. Jimenez is a 89yo lady with a past medical history of anxiety, depression, SB resection, AF s/p watchman, HTN, DM2, TIA, and chronic cellulitis/ wound of her right lower extremity    She was recently admitted to [facility] [place] 15 days on 1/4/23 - yesterday, 1/24/23 for, "management of worsening RLE cellulitis. Pt received IV vancomycin and ciprofloxacin initially, vancomycin discontinued. Derm consulted for worsening rash, ciprofloxacin discontinued for suspected drug reaction, and the pt was started on doxycycline. Psych consulted for worsening delirium and agitation and pt returned to mental baseline. ID consulted for worsening cellulitis. ID recommends stopping abx and treating with antifungals and steroids. JOSHUA improving s/p IVF, Cr back to baseline. Discontinued lasix and spironolactone, continue lokelma outpatient. Patient medically stable and ready for discharge to SNF. [facility] accepting."    She now returns from skilled unit due to agitated delirium.  As noted, this is not a new problem for her, as she had severe episodes recently at [facility].  She is unable to provide any meaningful history to me, as she it tangential, agitated, confused and occasionally combative.  I had a long talk with her daughter at the bedside as well.      In the ED her VS were BP (!) 144/83   Pulse 102   Temp 99.9 °F (37.7 °C) (Oral)   Resp 20   Ht 5' 5" (1.651 m)   Wt 76.7 kg (169 lb)   LMP  (LMP Unknown)   SpO2 95%   BMI 28.12 kg/m².  Labs showed WBC 6, Hg " 9.3, K 5.4 (hemolyzed), Cr 1.1, Alb 2.9, , COVID/FLU NEG.    CXR showed the heart size is mildly enlarged but not significantly changed.  Calcification is present along the wall of the aorta.  There is osseous demineralization and degenerative change.  Increased interstitial markings appears similar to previous examination.  Left lung base is not well evaluated due to patient positioning.  It is difficult to exclude pleural fluid or parenchymal disease in this region.  NC CT head showed no acute intracranial abnormalities identified, allowing for patient motion limitations.  No significant detrimental change compared to previous CT head from November 2022.  EKG showed Afib RVR, rate 115, PVCs, nonspecific ST-T changes, QTc 426.     In the ED she was treated with:  Medications   piperacillin-tazobactam (ZOSYN) 4.5 g in dextrose 5 % in water (D5W) 5 % 100 mL IVPB (MB+) (0 g Intravenous Stopped 1/25/23 1618)   vancomycin (VANCOCIN) 2,000 mg in dextrose 5 % (D5W) 500 mL IVPB (0 mg Intravenous Stopped 1/25/23 2007)   sodium chloride 0.9% bolus 1,000 mL 1,000 mL (0 mLs Intravenous Stopped 1/25/23 1638)   LORazepam injection 1 mg (1 mg Intravenous Given 1/25/23 1739)               * No surgery found *      Hospital Course:    89yo lady with a past medical history of anxiety, depression, SB resection, AF s/p watchman, HTN, DM2, TIA, and chronic cellulitis/ wound of her right lower extremity admitted on 01/25/2023 for altered mental status.  Patient was found to acute agitated delirium. CT head with   No acute intracranial abnormalities identified, CXR with no focal consolidation.  Patient was started on Seroquel nightly with improvement.  Delirium has resolved, mental status appears near baseline per daughter.  PT/OT consulted and recommend returning to skilled nursing facility.  However, daughter declined and would like patient to return back to her independent living.  Psych was consulted for further  recommendations. Suspect delirium likely due to illnesses, medications, or frequent change of hospitalization. Of note, patient was recently hospitalized and was discharge to SNF prior to this admission. Patient lacks capacity based on her limited understanding of her functional capacity.  Her daughter (medical decision maker) is unfortunately making poor decision to let patient return to independent living instead of going to SNF. Psych recommended notify elderly protective Services.  However, patient daughter has stated that she has taken off a week to take care of her mother and plan on hiring a 24 hour bedside care sitter on discharge.  Discussed with  and will not contact elderly protective services at this time given daughter will be with patient on discharge and planning to hire 24 hour care.    Patient is no longer delirious.  Continues to have generalized weakness, will benefit from skilled nursing facility.  However, daughter would prefer patient go home with home health.  Patient is alert and oriented x3, complains of wounds on her bilateral upper and lower extremities, but appears to be improving.  Pt denies any fever, headaches, vision changes, chest pain, shortness of breath, palpitations, abdominal pain, nausea, vomiting, or any diarrhea. Expressed that she wants to to go home. Patient's exam on discharge was as follow: Patient is alert and oriented, appears in no acute distress, heart with regular rate and rhythm, lungs clear to asculation with non-labored breathing, abdomen soft, and no focal deficits seen. Bilateral lower extremities without any edema or calf tenderness. Chronic wounds on bilateral LE. No bleeding or drainage. Will discharge home with home health and wound care. Will also have wound care and vascular surgery referral placed.     Patient and her daughter was counseled regarding any abnormal labs, differential diagnosis, treatment options, risk-benefit, lifestyle  changes, prognosis, current condition, and medications. Patient was interactive and attentive.  Patient's and her daughter's questions were answered in a respectful and timely manner. Patient and her daughter was instructed to have patient follow-up with PCP within 1 week and to continue taking medications as prescribed.  Instructed to also follow up with wound care and vascular surgery outpatient, referrals placed. Also, extensively discussed the risks, benefits, and side effects of patient's medications. Discussed with patient about any medication changes. Patient verbalized understanding and agrees to treatment plan.  Patient is stable for discharge.  Patient has no other questions or concerns at this time.  ED precautions discussed with the patient.    Vital signs are stable. Tolerating p.o. intake without any nausea or vomiting. Afebrile for over 24 hours. Patient is in stable condition and patient and daughter has no questions or concerns. Patient will be discharge to home with home health and DME with referrals for wound care and vascular surgery. Discussed again in length that patient would benefit from SNF. Daughter verbalized understanding but decline at this time and stated that she believe it is best to have patient back home . CM/SW to assist with discharge planning.     Vitals:    01/29/23 2315 01/30/23 0320 01/30/23 0729 01/30/23 1112   BP: (!) 162/63 (!) 155/69 (!) 140/65 123/60   BP Location: Right arm Right arm Right arm Right arm   Patient Position: Lying Lying Lying Lying   Pulse: 79 76 80 62   Resp: 18 18 19 20   Temp: 97.8 °F (36.6 °C) 97.3 °F (36.3 °C) 98.3 °F (36.8 °C) 98 °F (36.7 °C)   TempSrc: Oral Oral Oral Oral   SpO2: (!) 93% (!) 94% 97% (!) 94%   Weight:       Height:                Goals of Care Treatment Preferences:  Code Status: Full Code      Consults:   Consults (From admission, onward)        Status Ordering Provider     Inpatient consult to Psychiatry  Once        Provider:   Noel Robles MD    Completed FAMILIA PEREZ     Case Management/  Once        Provider:  (Not yet assigned)    Completed FAMILIA PEREZ          No new Assessment & Plan notes have been filed under this hospital service since the last note was generated.  Service: Hospital Medicine    Final Active Diagnoses:    Diagnosis Date Noted POA    PRINCIPAL PROBLEM:  Delirium due to medical condition with behavioral disturbance [F05] 01/25/2023 Yes    Pulmonary hypertension [I27.20] 01/26/2023 Yes    History of diabetes mellitus [Z86.39] 01/26/2023 Yes    Generalized skin eruption due to medication taken internally [L27.0] 01/11/2023 Yes    Hyperkalemia [E87.5] 01/05/2023 Yes    Non-healing wound of right lower extremity [S81.801A] 11/08/2022 Yes    Normocytic anemia [D64.9] 12/17/2020 Yes    Stage 3b chronic kidney disease [N18.32] 05/04/2016 Yes    Chronic diastolic heart failure [I50.32] 05/04/2016 Yes     Chronic    Chronic atrial fibrillation [I48.20] 01/29/2016 Yes     Chronic    Primary hypertension [I10]  Yes     Chronic      Problems Resolved During this Admission:       Discharged Condition: stable    Disposition: Home or Self Care    Follow Up:   Follow-up Information     Rubi Young,  Follow up in 1 week(s).    Specialty: Internal Medicine  Why: Dr Young Office will call to schedule a hospital follow up  Contact information:  [address] [facility] [place] [address]  [phone]             [facility] Follow up.    Specialties: Home Health Services, Home Therapy Services, Home Living Aide Services  Why: Office will call patient  Contact information:  [address]  [facility] [address]  [phone]           [facility] Follow up.    Specialty: Palliative Medicine  Why: Office will call patient  Contact information:  [address]  [facility] [address] [phone]            "[facility] .    Specialty: Hospice and Palliative Medicine  Contact information:  [address] [facility] [address] [place]  [phone]                       Patient Instructions:      COMMODE FOR HOME USE     Order Specific Question Answer Comments   Type: Standard    Height: 5' 5" (1.651 m)    Weight: 85 kg (187 lb 6.3 oz)    Does patient have medical equipment at home? hospital bed    Does patient have medical equipment at home? wheelchair    Does patient have medical equipment at home? bedside commode    Length of need (1-99 months): 99      Ambulatory referral/consult to HOME Palliative Care   Standing Status: Future   Referral Priority: Routine Referral Type: Consultation   Referred to Provider: [facility] Requested Specialty: Hospice and Palliative Medicine   Number of Visits Requested: 1     Diet diabetic     Diet Cardiac     Notify your health care provider if you experience any of the following:  temperature >100.4     Notify your health care provider if you experience any of the following:  difficulty breathing or increased cough     Notify your health care provider if you experience any of the following:  increased confusion or weakness     Notify your health care provider if you experience any of the following:  persistent dizziness, light-headedness, or visual disturbances     Activity as tolerated       Significant Diagnostic Studies: Labs: All labs within the past 24 hours have been reviewed       Recent Results (from the past 100 hour(s))   Comprehensive Metabolic Panel (CMP)    Collection Time: 01/27/23  3:47 AM   Result Value Ref Range    Sodium 142 136 - 145 mmol/L    Potassium 4.1 3.5 - 5.1 mmol/L    Chloride 111 (H) 95 - 110 mmol/L    CO2 22 (L) 23 - 29 mmol/L    Glucose 133 (H) 70 - 110 mg/dL    BUN 25 (H) 8 - 23 mg/dL    Creatinine 1.1 0.5 - 1.4 mg/dL    Calcium 7.8 (L) 8.7 - 10.5 mg/dL    Total Protein " 5.5 (L) 6.0 - 8.4 g/dL    Albumin 2.4 (L) 3.5 - 5.2 g/dL    Total Bilirubin 0.4 0.1 - 1.0 mg/dL    Alkaline Phosphatase 110 55 - 135 U/L    AST 11 10 - 40 U/L    ALT 10 10 - 44 U/L    Anion Gap 9 8 - 16 mmol/L    eGFR 48 (A) >60 mL/min/1.73 m^2   Magnesium    Collection Time: 01/27/23  3:47 AM   Result Value Ref Range    Magnesium 1.9 1.6 - 2.6 mg/dL   Phosphorus    Collection Time: 01/27/23  3:47 AM   Result Value Ref Range    Phosphorus 3.3 2.7 - 4.5 mg/dL   CBC with Automated Differential    Collection Time: 01/27/23  3:47 AM   Result Value Ref Range    WBC 3.57 (L) 3.90 - 12.70 K/uL    RBC 2.59 (L) 4.00 - 5.40 M/uL    Hemoglobin 7.3 (L) 12.0 - 16.0 g/dL    Hematocrit 23.1 (L) 37.0 - 48.5 %    MCV 89 82 - 98 fL    MCH 28.2 27.0 - 31.0 pg    MCHC 31.6 (L) 32.0 - 36.0 g/dL    RDW 17.5 (H) 11.5 - 14.5 %    Platelets 154 150 - 450 K/uL    MPV 11.1 9.2 - 12.9 fL    Immature Granulocytes 0.6 (H) 0.0 - 0.5 %    Gran # (ANC) 2.3 1.8 - 7.7 K/uL    Immature Grans (Abs) 0.02 0.00 - 0.04 K/uL    Lymph # 0.5 (L) 1.0 - 4.8 K/uL    Mono # 0.7 0.3 - 1.0 K/uL    Eos # 0.1 0.0 - 0.5 K/uL    Baso # 0.02 0.00 - 0.20 K/uL    nRBC 0 0 /100 WBC    Gran % 64.1 38.0 - 73.0 %    Lymph % 14.0 (L) 18.0 - 48.0 %    Mono % 19.0 (H) 4.0 - 15.0 %    Eosinophil % 1.7 0.0 - 8.0 %    Basophil % 0.6 0.0 - 1.9 %    Differential Method Automated    Comprehensive Metabolic Panel (CMP)    Collection Time: 01/28/23  5:22 PM   Result Value Ref Range    Sodium 140 136 - 145 mmol/L    Potassium 4.4 3.5 - 5.1 mmol/L    Chloride 109 95 - 110 mmol/L    CO2 24 23 - 29 mmol/L    Glucose 147 (H) 70 - 110 mg/dL    BUN 35 (H) 8 - 23 mg/dL    Creatinine 1.0 0.5 - 1.4 mg/dL    Calcium 8.3 (L) 8.7 - 10.5 mg/dL    Total Protein 5.8 (L) 6.0 - 8.4 g/dL    Albumin 2.4 (L) 3.5 - 5.2 g/dL    Total Bilirubin 0.3 0.1 - 1.0 mg/dL    Alkaline Phosphatase 122 55 - 135 U/L    AST 11 10 - 40 U/L    ALT 7 (L) 10 - 44 U/L    Anion Gap 7 (L) 8 - 16 mmol/L    eGFR 54 (A) >60  mL/min/1.73 m^2   Magnesium    Collection Time: 01/28/23  5:22 PM   Result Value Ref Range    Magnesium 1.9 1.6 - 2.6 mg/dL   Phosphorus    Collection Time: 01/28/23  5:22 PM   Result Value Ref Range    Phosphorus 2.6 (L) 2.7 - 4.5 mg/dL   CBC with Automated Differential    Collection Time: 01/28/23  5:22 PM   Result Value Ref Range    WBC 3.87 (L) 3.90 - 12.70 K/uL    RBC 2.92 (L) 4.00 - 5.40 M/uL    Hemoglobin 8.4 (L) 12.0 - 16.0 g/dL    Hematocrit 25.7 (L) 37.0 - 48.5 %    MCV 88 82 - 98 fL    MCH 28.8 27.0 - 31.0 pg    MCHC 32.7 32.0 - 36.0 g/dL    RDW 17.4 (H) 11.5 - 14.5 %    Platelets 182 150 - 450 K/uL    MPV 10.8 9.2 - 12.9 fL    Immature Granulocytes 0.5 0.0 - 0.5 %    Gran # (ANC) 2.2 1.8 - 7.7 K/uL    Immature Grans (Abs) 0.02 0.00 - 0.04 K/uL    Lymph # 0.9 (L) 1.0 - 4.8 K/uL    Mono # 0.6 0.3 - 1.0 K/uL    Eos # 0.1 0.0 - 0.5 K/uL    Baso # 0.02 0.00 - 0.20 K/uL    nRBC 0 0 /100 WBC    Gran % 56.6 38.0 - 73.0 %    Lymph % 23.8 18.0 - 48.0 %    Mono % 16.3 (H) 4.0 - 15.0 %    Eosinophil % 2.3 0.0 - 8.0 %    Basophil % 0.5 0.0 - 1.9 %    Differential Method Automated        Microbiology Results (last 7 days)     Procedure Component Value Units Date/Time    Blood culture x two cultures. Draw prior to antibiotics. [[id number]] Collected: 01/25/23 1535    Order Status: Completed Specimen: Blood from Peripheral, Antecubital, Left Updated: 01/29/23 8269     Blood Culture, Routine No Growth after 4 days.    Narrative:      Aerobic and anaerobic    Blood culture x two cultures. Draw prior to antibiotics. [[id number]] Collected: 01/25/23 1520    Order Status: Completed Specimen: Blood from Peripheral, Antecubital, Right Updated: 01/29/23 1703     Blood Culture, Routine No Growth after 4 days.    Narrative:      Aerobic and anaerobic          Imaging Results          CT Head Without Contrast (Final result)  Result time 01/25/23 17:59:59    Final result by Jensen Fierro MD (01/25/23 17:59:59)                  Impression:      No acute intracranial abnormalities identified, allowing for patient motion limitations.  No significant detrimental change compared to previous CT head from November 2022.      Electronically signed by: Jensen Fierro MD  Date:    01/25/2023  Time:    17:59             Narrative:    EXAMINATION:  CT HEAD WITHOUT CONTRAST    CLINICAL HISTORY:  Mental status change, unknown cause;    TECHNIQUE:  Low dose axial images were obtained through the head.  Coronal and sagittal reformations were also performed. Contrast was not administered.    COMPARISON:  CT head from November 2022.    FINDINGS:  Motion limited examination.  There is mild generalized cerebral volume loss and chronic microvascular ischemic change.  No evidence of acute/recent major vascular distribution cerebral infarction, intraparenchymal hemorrhage, or intra-axial space occupying lesion. The ventricular system is normal in size and configuration with no evidence of hydrocephalus. No effacement of the skull-base cisterns. No abnormal extra-axial fluid collections or blood products. Visualized paranasal sinuses and mastoid air cells are clear. The calvarium shows no acute abnormalities.  Postsurgical changes of partial left mastoidectomy are seen.                               X-Ray Chest AP Portable (Final result)  Result time 01/25/23 16:44:08    Final result by Sunshine Roach MD (01/25/23 16:44:08)                 Impression:      As above      Electronically signed by: Sunshine Roach MD  Date:    01/25/2023  Time:    16:44             Narrative:    EXAMINATION:  XR CHEST AP PORTABLE    CLINICAL HISTORY:  Sepsis;    TECHNIQUE:  Single frontal view of the chest was performed.    COMPARISON:  January 5, 2023    FINDINGS:  Heart size is mildly enlarged but not significantly changed.  Calcification is present along the wall of the aorta.  There is osseous demineralization and degenerative change.  Increased interstitial markings  appears similar to previous examination.  Left lung base is not well evaluated due to patient positioning.  It is difficult to exclude pleural fluid or parenchymal disease in this region.  Consider repeat imaging.                                  Pending Diagnostic Studies:     Procedure Component Value Units Date/Time    EKG 12-lead [[id number]]     Order Status: Sent Lab Status: No result     Methylmalonic acid, serum [[id number]] Collected: 01/26/23 0417    Order Status: Sent Lab Status: In process Updated: 01/26/23 0437    Specimen: Blood     Vitamin B1 [[id number]] Collected: 01/26/23 0417    Order Status: Sent Lab Status: In process Updated: 01/26/23 0503    Specimen: Blood          Medications:  Reconciled Home Medications:      Medication List      START taking these medications    QUEtiapine 25 MG Tab  Commonly known as: SEROQUEL  Take 1 tablet (25 mg total) by mouth every evening.        CONTINUE taking these medications    acetaminophen 500 MG tablet  Commonly known as: TYLENOL  Take 1,000 mg by mouth 2 (two) times a day.     aspirin 81 MG EC tablet  Commonly known as: ECOTRIN  Take 1 tablet (81 mg total) by mouth once daily.     diclofenac sodium 1 % Gel  Commonly known as: VOLTAREN  Apply 2 g topically 4 (four) times daily. Use gloves to apply to right hip for 10 days     famotidine 20 MG tablet  Commonly known as: PEPCID  Take 1 tablet (20 mg total) by mouth 2 (two) times daily. For Allergic Reaction.     LIDOcaine 5 %  Commonly known as: LIDODERM  Place 1 patch onto the skin once daily. Remove & Discard patch within 12 hours or as directed by provider. Apply to right hip.     miconazole NITRATE 2 % 2 % top powder  Commonly known as: MICOTIN  Apply topically 2 (two) times daily. To RLE.     OCUVITE LUTEIN AND ZEAXANTHIN ORAL  Take 1 capsule by mouth once daily. Lutien 25 mg, Zeaxanthin 5 mg     pravastatin 40 MG tablet  Commonly known as: PRAVACHOL  Take 1 tablet (40 mg total) by mouth once daily.      silver sulfADIAZINE 1% 1 % cream  Commonly known as: SILVADENE  Apply to RLE skin breakdown twice daily     sodium zirconium cyclosilicate 5 gram packet  Commonly known as: LOKELMA  Take 1 packet (5 g total) by mouth 2 (two) times a day. Mix entire contents of packet(s) into drinking glass containing 3 tablespoons of water; stir well and drink immediately. Add water and repeat until no powder remains to receive entire dose.     triamcinolone acetonide 0.1% 0.1 % ointment  Commonly known as: KENALOG  Apply to entire body twice daily     vitamin E 400 UNIT capsule  Take 400 Units by mouth once daily.        STOP taking these medications    azelastine 137 mcg (0.1 %) nasal spray  Commonly known as: ASTELIN     fluticasone propionate 50 mcg/actuation nasal spray  Commonly known as: FLONASE     guaiFENesin 100 mg/5 ml 100 mg/5 mL syrup  Commonly known as: ROBITUSSIN     levocetirizine 5 MG tablet  Commonly known as: XYZAL     OLANZapine 2.5 MG tablet  Commonly known as: ZyPREXA            Indwelling Lines/Drains at time of discharge:   Lines/Drains/Airways     None                 Time spent on the discharge of patient: Greater than 35 minutes         Jorge Mcclure DO  Department of Hospital Medicine  [facility]

## 2023-01-30 NOTE — PLAN OF CARE
[facility] Surg      HOME HEALTH ORDERS  FACE TO FACE ENCOUNTER    Patient Name: Jenniffer Jimenez  YOB: 1932    PCP: Rubi Young DO   PCP Address: [address] [place] [address] / [place] [address]  PCP Phone Number: [phone]  PCP Fax: [phone]    Encounter Date: 1/25/23    Admit to Home Health    Diagnoses:  Active Hospital Problems    Diagnosis  POA    *Delirium due to medical condition with behavioral disturbance [F05]  Yes    Pulmonary hypertension [I27.20]  Yes    History of diabetes mellitus [Z86.39]  Yes    Generalized skin eruption due to medication taken internally [L27.0]  Yes    Hyperkalemia [E87.5]  Yes    Non-healing wound of right lower extremity [S81.801A]  Yes    Normocytic anemia [D64.9]  Yes    Stage 3b chronic kidney disease [N18.32]  Yes    Chronic diastolic heart failure [I50.32]  Yes     Chronic     TTE (10/7/2022):    The left ventricle is normal in size with concentric remodeling and normal systolic function.  The estimated ejection fraction is 55-60%.  Grade III left ventricular diastolic dysfunction.  Mild mitral regurgitation.  Normal right ventricular size with mildly reduced right ventricular systolic function.  Severe tricuspid regurgitation.  The estimated PA systolic pressure is 52 mmHg. PASP may be under-estimated due to TR severity.  Elevated central venous pressure (15 mmHg).  There is pulmonary hypertension.  Severe left atrial enlargement.           Chronic atrial fibrillation [I48.20]  Yes     Chronic    Primary hypertension [I10]  Yes     Chronic      Resolved Hospital Problems   No resolved problems to display.       Follow Up Appointments:  Future Appointments   Date Time Provider Department Center   2/10/2023  1:45 PM Lionel Garland MD [facility] DERM [address]   2/16/2023 11:30 AM Marty Carrasquillo MD [facility] IM [address]   3/30/2023 10:00 AM Rubi Young DO [facility] IM [address]   6/29/2023  8:30 AM Rachael Case MD [facility] [address]  "      Allergies:  Review of patient's allergies indicates:   Allergen Reactions    Opioids - morphine analogues Other (See Comments)     Bowel issues; bowel obstruction    Tizanidine Other (See Comments)     "Lips were numb,  Almost passed out."    Tramadol Hallucinations    Beta-blockers (beta-adrenergic blocking agts) Other (See Comments)     Can not go on beta blockers for long period of time - due to taking allergy injections    Morphine     Opioids-meperidine and related     Ciprofloxacin Rash       Medications: Review discharge medications with patient and family and provide education.    Current Facility-Administered Medications   Medication Dose Route Frequency Provider Last Rate Last Admin    acetaminophen tablet 650 mg  650 mg Oral Q4H PRN FAMILIA Guajardo MD        albuterol-ipratropium 2.5 mg-0.5 mg/3 mL nebulizer solution 3 mL  3 mL Nebulization Q4H PRN FAMILIA Guajardo MD        aluminum-magnesium hydroxide-simethicone 200-200-20 mg/5 mL suspension 30 mL  30 mL Oral QID PRN FAMILIA Guajardo MD        aspirin EC tablet 81 mg  81 mg Oral Daily FAMILIA Guajardo MD   81 mg at 01/30/23 0842    dextrose 10% bolus 125 mL 125 mL  12.5 g Intravenous PRN FAMILIA Guajardo MD        dextrose 10% bolus 250 mL 250 mL  25 g Intravenous PRN FAMILIA Guajardo MD        enoxaparin injection 40 mg  40 mg Subcutaneous Daily FAMILIA Guajardo MD   40 mg at 01/29/23 1717    ferrous sulfate tablet 1 each  1 tablet Oral Daily Sarabjit Melara III, MD   1 each at 01/30/23 0842    glucagon (human recombinant) injection 1 mg  1 mg Intramuscular PRN FAMILIA Guajardo MD        glucose chewable tablet 16 g  16 g Oral PRN FAMILIA Guajardo MD        glucose chewable tablet 24 g  24 g Oral PRN FAMILIA Guajardo MD        miconazole NITRATE 2 % top powder   Topical (Top) BID FAMILIA Guajardo MD   Given at 01/30/23 0843    mupirocin 2 % ointment   Nasal BID FAMILIA Guajardo MD   Given at 01/30/23 0843    naloxone 0.4 mg/mL injection 0.02 mg "  0.02 mg Intravenous PRN FAMILIA Guajardo MD        ondansetron injection 4 mg  4 mg Intravenous Q8H PRN FAMILIA Guajardo MD   4 mg at 01/30/23 0256    polyethylene glycol packet 17 g  17 g Oral Daily FAMILIA Guajardo MD   17 g at 01/30/23 0842    pravastatin tablet 40 mg  40 mg Oral Daily FAMILIA Guajardo MD   40 mg at 01/30/23 0842    QUEtiapine tablet 25 mg  25 mg Oral QHS Sarabjit Melara III, MD   25 mg at 01/29/23 2132    senna-docusate 8.6-50 mg per tablet 1 tablet  1 tablet Oral BID PRN FAMILIA Guajardo MD        simethicone chewable tablet 80 mg  1 tablet Oral QID PRN FAMILIA Guajardo MD        sodium chloride 0.9% flush 10 mL  10 mL Intravenous Q12H PRN FAMILIA Guajardo MD        triamcinolone acetonide 0.1% cream   Topical (Top) BID Sarabjit Melara III, MD   Given at 01/29/23 2133     Current Discharge Medication List        START taking these medications    Details   QUEtiapine (SEROQUEL) 25 MG Tab Take 1 tablet (25 mg total) by mouth every evening.  Qty: 30 tablet, Refills: 11           CONTINUE these medications which have NOT CHANGED    Details   acetaminophen (TYLENOL) 500 MG tablet Take 1,000 mg by mouth 2 (two) times a day.      aspirin (ECOTRIN) 81 MG EC tablet Take 1 tablet (81 mg total) by mouth once daily.  Qty: 90 tablet, Refills: 3    Associated Diagnoses: Chronic atrial fibrillation      diclofenac sodium (VOLTAREN) 1 % Gel Apply 2 g topically 4 (four) times daily. Use gloves to apply to right hip for 10 days  Qty: 100 g, Refills: 1    Associated Diagnoses: Hip pain      famotidine (PEPCID) 20 MG tablet Take 1 tablet (20 mg total) by mouth 2 (two) times daily. For Allergic Reaction.  Qty: 30 tablet, Refills: 0    Associated Diagnoses: Allergic reaction to drug, subsequent encounter      LIDOcaine (LIDODERM) 5 % Place 1 patch onto the skin once daily. Remove & Discard patch within 12 hours or as directed by provider. Apply to right hip.  Qty: 5 patch, Refills: 0    Associated Diagnoses: Hip  pain      miconazole NITRATE 2 % (MICOTIN) 2 % top powder Apply topically 2 (two) times daily. To RLE.  Refills: 0      pravastatin (PRAVACHOL) 40 MG tablet Take 1 tablet (40 mg total) by mouth once daily.  Qty: 90 tablet, Refills: 3    Associated Diagnoses: Pure hypercholesterolemia      silver sulfADIAZINE 1% (SILVADENE) 1 % cream Apply to RLE skin breakdown twice daily      sodium zirconium cyclosilicate (LOKELMA) 5 gram packet Take 1 packet (5 g total) by mouth 2 (two) times a day. Mix entire contents of packet(s) into drinking glass containing 3 tablespoons of water; stir well and drink immediately. Add water and repeat until no powder remains to receive entire dose.  Qty: 60 packet, Refills: 3      triamcinolone acetonide 0.1% (KENALOG) 0.1 % ointment Apply to entire body twice daily      vit C/E/Zn/coppr/lutein/zeaxan (OCUVITE LUTEIN AND ZEAXANTHIN ORAL) Take 1 capsule by mouth once daily. Lutien 25 mg, Zeaxanthin 5 mg      vitamin E 400 UNIT capsule Take 400 Units by mouth once daily.           STOP taking these medications       azelastine (ASTELIN) 137 mcg (0.1 %) nasal spray Comments:   Reason for Stopping:         fluticasone propionate (FLONASE) 50 mcg/actuation nasal spray Comments:   Reason for Stopping:         guaiFENesin 100 mg/5 ml (ROBITUSSIN) 100 mg/5 mL syrup Comments:   Reason for Stopping:         levocetirizine (XYZAL) 5 MG tablet Comments:   Reason for Stopping:         OLANZapine (ZYPREXA) 2.5 MG tablet Comments:   Reason for Stopping:         OLANZapine (ZYPREXA) 2.5 MG tablet Comments:   Reason for Stopping:                 I have seen and examined this patient within the last 30 days. My clinical findings that support the need for the home health skilled services and home bound status are the following:no   Weakness/numbness causing balance and gait disturbance due to Weakness/Debility making it taxing to leave home.     Diet:   cardiac diet and diabetic diet 2000  calorie        Referrals/ Consults  Physical Therapy to evaluate and treat. Evaluate for home safety and equipment needs; Establish/upgrade home exercise program. Perform / instruct on therapeutic exercises, gait training, transfer training, and Range of Motion.  Occupational Therapy to evaluate and treat. Evaluate home environment for safety and equipment needs. Perform/Instruct on transfers, ADL training, ROM, and therapeutic exercises.    Activities:   activity as tolerated    Nursing:   Agency to admit patient within 24 hours of hospital discharge unless specified on physician order or at patient request    SN to complete comprehensive assessment including routine vital signs. Instruct on disease process and s/s of complications to report to MD. Review/verify medication list sent home with the patient at time of discharge  and instruct patient/caregiver as needed. Frequency may be adjusted depending on start of care date.     Skilled nurse to perform up to 3 visits PRN for symptoms related to diagnosis    Notify MD if SBP > 160 or < 90; DBP > 90 or < 50; HR > 120 or < 50; Temp > 101; O2 < 88%;     Ok to schedule additional visits based on staff availability and patient request on consecutive days within the home health episode.    When multiple disciplines ordered:    Start of Care occurs on Sunday - Wednesday schedule remaining discipline evaluations as ordered on separate consecutive days following the start of care.    Thursday SOC -schedule subsequent evaluations Friday and Monday the following week.     Friday - Saturday SOC - schedule subsequent discipline evaluations on consecutive days starting Monday of the following week.    For all post-discharge communication and subsequent orders please contact patient's primary care physician.         Home Health Aide:  Physical Therapy Three times weekly and Occupational Therapy Three times weekly    Wound Care Orders  Yes  Location: right lower extremity  Wound:  superficial venous stasis  local wound care to RLE with triamcinolone and Aquacel hydrofiber to absorb drainage     I certify that this patient is confined to her home and needs intermittent skilled nursing care, physical therapy, and occupational therapy.

## 2023-01-30 NOTE — NURSING
Report received and care assumed. Reviewed call system. No acute distress noted. Patient is asleep, easily aroused, and sitter is at bedside. No sign of pain or discomfort.

## 2023-01-30 NOTE — PLAN OF CARE
01/30/23 1335   Medicare Message   Important Message from Medicare regarding Discharge Appeal Rights Given to patient/caregiver;Explained to patient/caregiver;Signed/date by patient/caregiver   Date IMM was signed 01/30/23   Time IMM was signed [id number]

## 2023-01-30 NOTE — NURSING
Reviewed discharge instructions with patient and daughter. Commode given.Instructions includes follow up appointments,EMS and when to seeks additional medical help. No acute distress noted . However all instructions were not reviewed patient's daughter states read follow up appointment and understands them. Rolled out to car and helped in

## 2023-01-30 NOTE — PLAN OF CARE
"[facility] - Med Surg  Discharge Final Note    Primary Care Provider: Rubi Young DO    Expected Discharge Date: 1/30/2023    CM met with pt's daughter, Afshan to discuss discharge needs. CM informed pt's daughter that [facility] has been assigned. Pt stated : I will have 24 hour sitters because I can't clean my mother." Pt's daughter stated she has taken off  work for 7 days to assist     CM informed pt's daughter that PT/OT recommended a hospital bed, bedside commode and wheelchair. Pt's daughter stated ' I have a wheelchair and I don't need a hospital bed at this time." Pt's daughter does want bedside commode. DME will deliver to bedside.    CM notified nurse, Colleen that pt is ready for discharge from CM standpoint. All CM needs met.    Final Discharge Note (most recent)       Final Note - 01/30/23 1421          Final Note    Assessment Type Final Discharge Note (P)      Anticipated Discharge Disposition Home-Health Care Svc (P)      What phone number can be called within the next 1-3 days to see how you are doing after discharge? [phone] (P)      Hospital Resources/Appts/Education Provided Appointments scheduled and added to AVS;Appointments scheduled by Navigator/Coordinator (P)         Post-Acute Status    Post-Acute Authorization Home Health (P)      Home Health Status Set-up Complete/Auth obtained (P)      Coverage [employer] (P)      Discharge Delays None known at this time (P)                      Important Message from Medicare  Important Message from Medicare regarding Discharge Appeal Rights: Given to patient/caregiver, Explained to patient/caregiver, Signed/date by patient/caregiver     Date IMM was signed: 01/30/23  Time IMM was signed: 1320    Contact Info       Rubi Young DO   Specialty: Internal Medicine   Relationship: PCP - General    [address] [facility] [address]   Phone: [phone]       Next Steps: Follow up in 1 week(s)    Instructions: Dr Young " Office will call to schedule a hospital follow up    [facility]   Specialty: Home Health Services, Home Therapy Services, Home Living Aide Services    [address]  [facility] [place] [address]   Phone: [phone]       Next Steps: Follow up    Instructions: Office will call patient    [facility]   Specialty: Palliative Medicine    [address]  [facility] [address]   Phone: [phone]       Next Steps: Follow up    Instructions: Office will call patient    [facility]   Specialty: Hospice and Palliative Medicine    [address] [address]   Phone: [phone]       Next Steps: Follow up

## 2023-01-31 ENCOUNTER — TELEPHONE (OUTPATIENT)
Dept: INTERNAL MEDICINE | Facility: CLINIC | Age: 88
End: 2023-01-31
Payer: MEDICARE

## 2023-01-31 ENCOUNTER — NURSE TRIAGE (OUTPATIENT)
Dept: ADMINISTRATIVE | Facility: CLINIC | Age: 88
End: 2023-01-31
Payer: MEDICARE

## 2023-01-31 ENCOUNTER — PATIENT OUTREACH (OUTPATIENT)
Dept: ADMINISTRATIVE | Facility: CLINIC | Age: 88
End: 2023-01-31
Payer: MEDICARE

## 2023-01-31 LAB — METHYLMALONATE SERPL-SCNC: 0.41 UMOL/L

## 2023-01-31 NOTE — TELEPHONE ENCOUNTER
Vitamin D 3 2000 units once daily.  Lasix 20 mg 1 tablet PO BID before meals, take 1 extra tablet BID PRN weight gain of 2-3 lbs in a day or 4-6 lbs in a week.  Spironolactone 25 mg 1 tablet BID before meals.

## 2023-01-31 NOTE — TELEPHONE ENCOUNTER
----- Message from Micaela Chiang sent at 1/30/2023 11:15 AM CST -----  Contact: self/[phone]  Caller is requesting an earlier appointment then we can schedule.  Caller is requesting a message be sent to the provider.  If this is for urgent care symptoms, did you offer other providers at this location, providers at other locations, or [facility]? (yes, no, n/a):    If this is for the patients physical, did you offer to schedule next available and put on wait list, or to see NP or PA for their physical?  (yes, no, an/a):    When is the next available appointment with their provider:  2-13-23  Reason for the appointment:  7 day fe//U  Patient preference of timeframe to be scheduled:  ASAP  Would the patient like a call back, or a response through their [facility] portal?:   call back  Comments:       Please advise

## 2023-01-31 NOTE — PROGRESS NOTES
C3 nurse spoke with Jenniffer Tyler for a TCC post hospital discharge follow up call. The patient has a scheduled HOSFU appointment with Marty Carrasquillo on 2/16/23 @ 2230.

## 2023-01-31 NOTE — TELEPHONE ENCOUNTER
"Speaking to pt and Afshan. Having SOB, requesting oxygen, discharged from hospital yesterday. Has had home oxygen before and was needing oxygen while in hospital.     Advised go to ED now. VU but declines. [facility] that they will not go to ED. At home and doing well but requesting oxygen .     Home Health coming to visit today. Will ask HH to check spo2.     Informed caller will route message to PCP. Requesting call =back  Reason for Disposition   Any history of prior "blood clot" in leg or lungs    Additional Information   Negative: SEVERE difficulty breathing (e.g., struggling for each breath, speaks in single words, pulse > 120)   Negative: Breathing stopped and hasn't returned   Negative: Choking on something   Negative: Bluish (or gray) lips or face   Negative: Difficult to awaken or acting confused (e.g., disoriented, slurred speech)   Negative: Passed out (i.e., fainted, collapsed and was not responding)   Negative: Wheezing started suddenly after medicine, an allergic food, or bee sting   Negative: Stridor   Negative: Slow, shallow and weak breathing   Negative: Sounds like a life-threatening emergency to the triager   Negative: MODERATE difficulty breathing (e.g., speaks in phrases, SOB even at rest, pulse 100-120) of new-onset or worse than normal   Negative: Wheezing can be heard across the room    Protocols used: Breathing Difficulty-A-[place]    "

## 2023-02-01 LAB — VIT B1 BLD-MCNC: 46 UG/L (ref 38–122)

## 2023-02-02 ENCOUNTER — OUTPATIENT CASE MANAGEMENT (OUTPATIENT)
Dept: ADMINISTRATIVE | Facility: OTHER | Age: 88
End: 2023-02-02
Payer: MEDICARE

## 2023-02-02 NOTE — PROGRESS NOTES
Outpatient Care Management  Plan of Care Follow Up Visit    Patient: Jenniffer Jimenez  MRN: [id number]  Date of Service: 02/02/2023  Completed by: Lima Washington RN  Referral Date: 10/10/2022    Reason for Visit   Patient presents with    OPCM Chart Review     2/2/2023    Follow-up     2/2/2023      Update Plan Of Care     2/2/2023       Brief Summary: Patient's daughter, Afshan, states that the patient does not currently have any swelling in her ankles at this time.  The patient is short of breath on exertion only since she came home from the hospital.  Her current weight is 171 lbs which is up from 12/8 phone call which was 160 lbs.  Afshan states the patient doesn't use salt but sometimes eats things she shouldn't.  The patient has been in the hospital for part of the time since the last phone call, from 1/4 to 1/30/23 for right leg wound infection.  SNF was recommended for the patient by PT/OT and MD in the hospital but the family chose to take the patient home because of growing delirium being in an unfamiliar environment.  Patient is now walking to the bathroom by herself with her walker.  Patient's daughter, Afshan, states that the patient says her leg wound is looking better.  Afshan states she cannot look at the wound and doesn't know for sure.  She states there is no drainage on the outside of the bandage that she can see.  She states the patient drinks a packet of Bob everyday and sometimes twice a day if Afshan can get her to do it.     CM reviewed the 3 lb/5 lb rule with Afshan, patient's daughter.  CM informed her that patient should be taking her weight daily and writing it down in a log so she can see if she is gaining weight.  She should weigh first thing in the morning after she empties her bladder.  CM reviewed Bob with Afshan and it's importance to wound healing.  CM also reviewed the signs and symptoms of a wound infection with Afshan including fever above 100.4,  swelling, drainage, pain or redness in or around wound, red streaks in skin around wound.  REJI sent message to Clara VAZQUEZ to clarify home health orders of frequency of dressing changes.  Afshan mentioned patient needs audiology appointment, CM called ENT to schedule but needs an ENT referral from Dr. Young first.  CM messaged Dr. Young for referral.    2/3/2023  8:15 AM  CM received phone call from Abi SERRATO in Clara Arteaga's office.  REJI scheduled a hospital follow up appointment with Clara for the patient for Monday 2/6/2023 at 1:00 PM.  REJI then called patient's daughter, Afshan, to inform her of appointment.  Afshan will bring patient on Monday to appointment.    Next steps:   Follow up in two weeks  Follow up on 3 lb/5 lb rule  Follow up on S&S of CHF  Follow up on low sodium diet  Follow up on daily weights and trends  Follow up if taking diuretic medication  Follow up if following 1700 ml fluid restriction  Follow up if avoiding processed foods  Follow up on self care steps for wounds  Follow up if still taking Bob  Educate on S&S of wound infection

## 2023-02-03 ENCOUNTER — TELEPHONE (OUTPATIENT)
Dept: WOUND CARE | Facility: CLINIC | Age: 88
End: 2023-02-03
Payer: MEDICARE

## 2023-02-03 ENCOUNTER — PES CALL (OUTPATIENT)
Dept: ADMINISTRATIVE | Facility: CLINIC | Age: 88
End: 2023-02-03
Payer: MEDICARE

## 2023-02-03 NOTE — TELEPHONE ENCOUNTER
Return call and spoke with Rona, scheduled hospital f/u for Monday 2/6/2023 at 1pm.  Rona will call family to advised of appointment date/time.

## 2023-02-03 NOTE — TELEPHONE ENCOUNTER
----- Message from Lima Washington RN sent at 2/3/2023  7:57 AM CST -----  Regarding: Right leg wound  Good morning,    I am writing about this patient home health leg wound orders.  This patient was recently hospitalized for her right lower leg wound and AMS.  She was discharged on 1/30.  Dr. Jorge Mcclure wrote orders in the hospital for the patient to have wound care by home health.  Per Dr. Mcclure's orders:      Location: right lower extremity  Wound: superficial venous stasis  local wound care to RLE with triamcinolone and Aquacel hydrofiber to absorb drainage     There is no frequency in the orders and was hoping you could clarify the orders for home health.  She is currently with [facility] [phone].  Please advise.    Thank you,  Rona Washington RN  RN Case Manager  Outpatient Complex Care Management  [facility]  [phone]  [email or web address]

## 2023-02-06 ENCOUNTER — OFFICE VISIT (OUTPATIENT)
Dept: WOUND CARE | Facility: CLINIC | Age: 88
End: 2023-02-06
Payer: MEDICARE

## 2023-02-06 ENCOUNTER — PES CALL (OUTPATIENT)
Dept: ADMINISTRATIVE | Facility: CLINIC | Age: 88
End: 2023-02-06
Payer: MEDICARE

## 2023-02-06 ENCOUNTER — TELEPHONE (OUTPATIENT)
Dept: WOUND CARE | Facility: CLINIC | Age: 88
End: 2023-02-06

## 2023-02-06 VITALS
WEIGHT: 188.5 LBS | SYSTOLIC BLOOD PRESSURE: 136 MMHG | HEIGHT: 65 IN | TEMPERATURE: 99 F | DIASTOLIC BLOOD PRESSURE: 68 MMHG | HEART RATE: 75 BPM | BODY MASS INDEX: 31.4 KG/M2

## 2023-02-06 DIAGNOSIS — I87.2 VENOUS INSUFFICIENCY OF BOTH LOWER EXTREMITIES: ICD-10-CM

## 2023-02-06 DIAGNOSIS — S81.802A OPEN WOUND OF LEFT LOWER EXTREMITY, INITIAL ENCOUNTER: ICD-10-CM

## 2023-02-06 DIAGNOSIS — Z86.73 HISTORY OF TIA (TRANSIENT ISCHEMIC ATTACK): ICD-10-CM

## 2023-02-06 DIAGNOSIS — S81.801A NON-HEALING WOUND OF RIGHT LOWER EXTREMITY: Primary | ICD-10-CM

## 2023-02-06 DIAGNOSIS — E13.42 DIABETIC POLYNEUROPATHY ASSOCIATED WITH OTHER SPECIFIED DIABETES MELLITUS: ICD-10-CM

## 2023-02-06 DIAGNOSIS — I10 PRIMARY HYPERTENSION: ICD-10-CM

## 2023-02-06 DIAGNOSIS — N18.32 STAGE 3B CHRONIC KIDNEY DISEASE: ICD-10-CM

## 2023-02-06 DIAGNOSIS — I50.32 CHRONIC DIASTOLIC HEART FAILURE: ICD-10-CM

## 2023-02-06 PROCEDURE — 11045 DEBRIDEMENT: ICD-10-PCS | Mod: HCNC,S$GLB,,

## 2023-02-06 PROCEDURE — 99214 PR OFFICE/OUTPT VISIT, EST, LEVL IV, 30-39 MIN: ICD-10-PCS | Mod: 25,HCNC,S$GLB,

## 2023-02-06 PROCEDURE — 11042 DBRDMT SUBQ TIS 1ST 20SQCM/<: CPT | Mod: HCNC,S$GLB,,

## 2023-02-06 PROCEDURE — 11045 DBRDMT SUBQ TISS EACH ADDL: CPT | Mod: HCNC,S$GLB,,

## 2023-02-06 PROCEDURE — 99214 OFFICE O/P EST MOD 30 MIN: CPT | Mod: 25,HCNC,S$GLB,

## 2023-02-06 PROCEDURE — 99999 PR PBB SHADOW E&M-EST. PATIENT-LVL II: ICD-10-PCS | Mod: PBBFAC,HCNC,,

## 2023-02-06 PROCEDURE — 99999 PR PBB SHADOW E&M-EST. PATIENT-LVL II: CPT | Mod: PBBFAC,HCNC,,

## 2023-02-06 PROCEDURE — 1111F PR DISCHARGE MEDS RECONCILED W/ CURRENT OUTPATIENT MED LIST: ICD-10-PCS | Mod: HCNC,CPTII,S$GLB,

## 2023-02-06 PROCEDURE — 1111F DSCHRG MED/CURRENT MED MERGE: CPT | Mod: HCNC,CPTII,S$GLB,

## 2023-02-06 PROCEDURE — 11042 DEBRIDEMENT: ICD-10-PCS | Mod: HCNC,S$GLB,,

## 2023-02-06 NOTE — TELEPHONE ENCOUNTER
Informed daughter that I told her uncle that it's okay if he take his sister out tomorrow but make sure when he gets back home with her to elevate her legs to help with the swelling.

## 2023-02-06 NOTE — PROGRESS NOTES
"Subjective:       Patient ID: Jenniffer Jimenez is a 89 y.o. female.    Chief Complaint: Wound Check    Patient presents for a reevaluation of a right lower extremity wound. Patient was admitted from 1/4-1/24/23 for her wound cellulitis, JOSHUA, altered mental status, and a rash. Patient was discharged to a skilled nursing facility. Patient was re-admitted on 1/25-1/30/23 for altered mental status. She was discharged home with home health. Pt requests home health to come out 3x a week. Denies fever, chills, erythema, warmth, purulent drainage, or pain.    ABIs 11/90/22:  Right HOLLY:1.17  Left HOLLY: 1.14      From Chart Review:  "Hospital Course:   Pt placed in observation for management of worsening RLE cellulitis. Pt received IV vancomycin and ciprofloxacin initially, vancomycin discontinued. Derm consulted for worsening rash, ciprofloxacin discontinued for suspected drug reaction, and the pt was started on doxycycline. Psych consulted for worsening delirium and agitation and pt returned to mental baseline. ID consulted for worsening cellulitis. ID recommends stopping abx and treating with antifungals and steroids. JOSHUA improving s/p IVF, Cr back to baseline. Discontinued lasix and spironolactone, continue lokelma outpatient. Patient medically stable and ready for discharge to SNF. [facility]. Will discharge to SNF. SNF orders updated. Patient will follow up with wound clinic, vascular, infectious disease, and dermatology. Plan of care discussed with patient, patient agreeable to plan, and all questions answered. "    Review of Systems   Constitutional:  Negative for activity change, chills, diaphoresis, fatigue and fever.   Respiratory:  Negative for apnea, chest tightness and shortness of breath.    Cardiovascular:  Positive for leg swelling. Negative for chest pain and palpitations.   Musculoskeletal:  Negative for gait problem and joint swelling.   Skin:  Positive for wound. Negative for color " change, pallor and rash.   Neurological:  Negative for syncope, weakness and numbness.   Psychiatric/Behavioral:  Negative for agitation. The patient is not nervous/anxious.    All other systems reviewed and are negative.    Objective:      Physical Exam  Vitals reviewed.   Constitutional:       General: She is not in acute distress.     Appearance: Normal appearance.   Cardiovascular:      Rate and Rhythm: Normal rate and regular rhythm.      Pulses: Normal pulses.   Pulmonary:      Effort: No respiratory distress.   Musculoskeletal:         General: No swelling.      Right lower leg: Edema present.   Skin:     General: Skin is warm and dry.      Capillary Refill: Capillary refill takes less than 2 seconds.      Findings: Wound present. No erythema.          Neurological:      General: No focal deficit present.      Mental Status: She is alert and oriented to person, place, and time.   Psychiatric:         Mood and Affect: Mood normal.         Behavior: Behavior normal.         Thought Content: Thought content normal.         Judgment: Judgment normal.       Assessment:       1. Non-healing wound of right lower extremity    2. Venous insufficiency of both lower extremities    3. History of TIA (transient ischemic attack)    4. Diabetic polyneuropathy associated with other specified diabetes mellitus    5. Primary hypertension    6. Chronic diastolic heart failure    7. Stage 3b chronic kidney disease    8. BMI 31.0-31.9,adult    9. Open wound of left lower extremity, initial encounter             Altered Skin Integrity 09/25/22 2010 Right lower Leg #1 (Active)   09/25/22 2010   Altered Skin Integrity Present on Admission: yes   Side: Right   Orientation: lower   Location: Leg   Wound Number: #1   Is this injury device related?:    Primary Wound Type:    Description of Altered Skin Integrity:    Ankle-Brachial Index:    Pulses:    Removal Indication and Assessment:    Wound Outcome:    (Retired) Wound Length (cm):     (Retired) Wound Width (cm):    (Retired) Depth (cm):    Wound Description (Comments):    Removal Indications:    Wound Image     02/06/23 1311   Dressing Appearance Dry;Intact;Clean 02/06/23 1311   Drainage Amount Small 02/06/23 1311   Drainage Characteristics/Odor Serous 02/06/23 1311   Red (%), Wound Tissue Color 50 % 02/06/23 1311   Yellow (%), Wound Tissue Color 50 % 02/06/23 1311   Periwound Area Intact;Dry;Edematous;Pink;Macerated 02/06/23 1311   Wound Length (cm) 10.6 cm 02/06/23 1311   Wound Width (cm) 24.8 cm 02/06/23 1311   Wound Depth (cm) 0.1 cm 02/06/23 1311   Wound Volume (cm^3) 26.288 cm^3 02/06/23 1311   Wound Surface Area (cm^2) 262.88 cm^2 02/06/23 1311   Care Cleansed with:;Soap and water 02/06/23 1311   Dressing Applied;Compression wrap;Other (comment) 02/06/23 1311   Periwound Care Skin barrier film applied 02/06/23 1311   Compression Two layer compression 02/06/23 1311            Altered Skin Integrity Left lower;medial Leg (Active)       Altered Skin Integrity Present on Admission:    Side: Left   Orientation: lower;medial   Location: Leg   Wound Number:    Is this injury device related?:    Primary Wound Type:    Description of Altered Skin Integrity:    Ankle-Brachial Index:    Pulses:    Removal Indication and Assessment:    Wound Outcome:    (Retired) Wound Length (cm):    (Retired) Wound Width (cm):    (Retired) Depth (cm):    Wound Description (Comments):    Removal Indications:    Wound Image    02/06/23 1405   Dressing Appearance Dry;Intact;Clean 02/06/23 1405   Drainage Amount None 02/06/23 1405   Black (%), Wound Tissue Color 100 % 02/06/23 1405   Periwound Area Intact;Edematous;[place] 02/06/23 1405   Wound Length (cm) 4 cm 02/06/23 1405   Wound Width (cm) 0.3 cm 02/06/23 1405   Wound Depth (cm) 0.1 cm 02/06/23 1405   Wound Volume (cm^3) 0.12 cm^3 02/06/23 1405   Wound Surface Area (cm^2) 1.2 cm^2 02/06/23 1405   Care Cleansed with:;Soap and water 02/06/23 1405   Dressing  Applied;Compression wrap 02/06/23 1405   Periwound Care Skin barrier film applied 02/06/23 1405   Compression Two layer compression 02/06/23 1405      Jenniffer was seen in the clinic room and placed in the supine position on the treatment table.  The wound dressing was removed and the leg was cleansed with Easi-clense sponges and dried thoroughly. No odor, erythema, green drainage, or warmth noted from patient's baseline. Sharp debridement with scissors and pickups to remove non-viable tissue from bilateral lower extremities. Pt tolerated procedure well. Left distal scab removed and no open wound noted.      Plan of Care:   Left lower extremity: 2 layer calamine coflex wrap  Right lower extremity: Polymem to wound bed and covered with a 2 layer calamine coflex wrap.  The patient's feet were positioned at a 90 degree angle.  The wrap was applied using a spiral technique avoiding creases or folds.  The wrap was started behind the first metatarsal and ended below the tibial tubercle of the knee.  There was overlap of each turn half the width of the previous turn.    Plan:       Bilateral lower extremities dressed as detailed above.  Patient was instructed to not get the dressings wet and to use cast covers for showering.  Should the dressing become wet, she is to remove it, place a moist dressing over the wound, cover with gauze and roll gauze and use ace wraps for compression and to secure bandages.  She should then notify this office or home health as soon as possible to have a new dressing applied.  Instructed patient to remove wrap if she notices tingling or coldness to her right lower extremity or if her toes become white, blue, or cold.    Faxed home health care orders to [facility]:  HOME HEALTH WOUND CARE ORDERS  D/C previous orders  Wound care and nurse visits: 3X a week and PRN complications  1. Cleanse wound with saline, mild soap and water, or wound cleanser  2.Apply:  Left lower extremity: 2 layer calamine coflex  wrap  Right lower extremity: Polymem to wound bed and covered with a 2 layer calamine coflex.  Two layer calamine wrap from below her first metatarsal and ending below the tibial tubercle of the knee and covering her ankle  3.Cover with appropriate cover dressing  Monitor for infection and teach patient/caregiver signs and symptoms    Discussed nutrition and the role of protein in wound healing with the patient. Instructed patient to continue to optimize protein for wound healing within CKD protein limitations.     Instructed patient to elevate legs whenever sedentary, to follow a low salt diet, and take lasix as prescribed.    Scheduling number given for Infectious Disease. Instructed patient to contact to schedule an appointment.     All questions were answered.  Written and verbal instructions given to patient  RTC in 3-4 weeks      Clara Arteaga PA-C

## 2023-02-06 NOTE — PROCEDURES
Debridement    Date/Time: 2/6/2023 1:00 PM  Performed by: Clara Arteaga PA-C  Authorized by: Clara Arteaga PA-C     Consent Done?:  Yes (Written)  Local anesthesia used?: No      Wound Details:    Location:  Left leg    Type of Debridement:  Excisional       Length (cm):  0.4       Area (sq cm):  0.12       Width (cm):  0.3       Percent Debrided (%):  100       Depth (cm):  0.1       Total Area Debrided (sq cm):  0.12    Depth of debridement:  Subcutaneous tissue    Tissue debrided:  Subcutaneous    Devitalized tissue debrided:  Biofilm, Exudate, Fibrin and Slough    Instruments:  Forceps and Scissors    Additional wounds:  1    2nd Wound Details:     Location:  Right leg    Type of Debridement:  Excisional       Length (cm):  10.6       Area (sq cm):  262.88       Width (cm):  24.8       Percent Debrided (%):  50       Depth (cm):  0.1       Total Area Debrided (sq cm):  131.44    Depth of debridement:  Subcutaneous tissue    Tissue debrided:  Subcutaneous    Devitalized tissue debrided:  Biofilm, Exudate, Fibrin and Slough    Instruments:  Forceps and Scissors    Bleeding:  Minimal  Hemostasis Achieved: Yes    Method Used:  Pressure  Patient tolerance:  Patient tolerated the procedure well with no immediate complications

## 2023-02-06 NOTE — TELEPHONE ENCOUNTER
----- Message from Lili Betts sent at 2/6/2023  3:53 PM CST -----  Regarding: Patient advice  Contact: [phone]  Patient daughter called to ask if 5 hours of setting which means legs are hanging acceptable states mother has wound and not sure if the leg should not be hanging for more than 2 hours Please call to discuss further

## 2023-02-07 ENCOUNTER — TELEPHONE (OUTPATIENT)
Dept: AUDIOLOGY | Facility: CLINIC | Age: 88
End: 2023-02-07
Payer: MEDICARE

## 2023-02-08 ENCOUNTER — TELEPHONE (OUTPATIENT)
Dept: WOUND CARE | Facility: CLINIC | Age: 88
End: 2023-02-08
Payer: MEDICARE

## 2023-02-08 NOTE — TELEPHONE ENCOUNTER
----- Message from Suzy Garcia sent at 2/8/2023 10:41 AM CST -----  Contact: Vinod Hoodsnivia [facility]  Vinod Hoodsnivia [facility] nurse is requesting a callback in regards to pt care wound care orders Please adv       Confirmed contact below:   Contact Name:Vinod  Phone Number: [phone]

## 2023-02-09 ENCOUNTER — TELEPHONE (OUTPATIENT)
Dept: WOUND CARE | Facility: CLINIC | Age: 88
End: 2023-02-09
Payer: MEDICARE

## 2023-02-09 DIAGNOSIS — Z00.00 ENCOUNTER FOR MEDICARE ANNUAL WELLNESS EXAM: ICD-10-CM

## 2023-02-09 NOTE — TELEPHONE ENCOUNTER
Patient stated she was calling to make sure that the Home Health Orders was faxed. I told her yes I had faxed the order twice this week to 2 different numbers as instructed.

## 2023-02-09 NOTE — TELEPHONE ENCOUNTER
----- Message from Syeda Gaming MA sent at 2/9/2023 11:29 AM CST -----  Regarding: orders  Contact: [phone]  Patient called stated that  she  needs orders for her bandages for her wound please call pt at  [phone]

## 2023-02-10 ENCOUNTER — TELEPHONE (OUTPATIENT)
Dept: ADMINISTRATIVE | Facility: CLINIC | Age: 88
End: 2023-02-10
Payer: MEDICARE

## 2023-02-13 ENCOUNTER — TELEPHONE (OUTPATIENT)
Dept: INTERNAL MEDICINE | Facility: CLINIC | Age: 88
End: 2023-02-13
Payer: MEDICARE

## 2023-02-13 PROCEDURE — G0179 MD RECERTIFICATION HHA PT: HCPCS | Mod: ,,, | Performed by: INTERNAL MEDICINE

## 2023-02-13 PROCEDURE — G0179 PR HOME HEALTH MD RECERTIFICATION: ICD-10-PCS | Mod: ,,, | Performed by: INTERNAL MEDICINE

## 2023-02-13 NOTE — TELEPHONE ENCOUNTER
Spoke with deonte from [facility] ,   He states he spoke with a nurse from [facility] and she gave him the ok to put medi honey and foam dressing on skin tare , josias states the tare look much better today .

## 2023-02-13 NOTE — TELEPHONE ENCOUNTER
----- Message from Tran Luis sent at 2/10/2023  2:41 PM CST -----  Contact: Kong PT from [phone]  PT Kong from [facility] would like a call back. Jenniffer has a skin tear on the back of her left arm with Slough  He wants to get orders for Medi Honey foam dressing & chg 3 times a week.

## 2023-02-14 ENCOUNTER — TELEPHONE (OUTPATIENT)
Dept: WOUND CARE | Facility: CLINIC | Age: 88
End: 2023-02-14
Payer: MEDICARE

## 2023-02-14 NOTE — TELEPHONE ENCOUNTER
Spoke with [facility] Nurse [phone] to see what supplies was they using on the patient legs and she stated wrapping with dry foam the one without medication then the calamine coflex wrap. I stated to her that isn't on the wound care orders that was faxed over. She then asked me to call Rob and let her know, spoke with Rob went over the wound care orders with her and she stated that they wasn't using the polymem on the bed of the wounds. She'll get with the nurses today or tomorrow and get back with because the patient might need to make and appointment to come into see Chandler WALKER sooner than 3/7/2023.

## 2023-02-15 ENCOUNTER — OUTPATIENT CASE MANAGEMENT (OUTPATIENT)
Dept: ADMINISTRATIVE | Facility: OTHER | Age: 88
End: 2023-02-15
Payer: MEDICARE

## 2023-02-15 ENCOUNTER — TELEPHONE (OUTPATIENT)
Dept: INTERNAL MEDICINE | Facility: CLINIC | Age: 88
End: 2023-02-15
Payer: MEDICARE

## 2023-02-15 DIAGNOSIS — H91.90 HEARING LOSS, UNSPECIFIED HEARING LOSS TYPE, UNSPECIFIED LATERALITY: Primary | ICD-10-CM

## 2023-02-15 NOTE — PROGRESS NOTES
Outpatient Care Management  Plan of Care Follow Up Visit    Patient: Jenniffer Jimenez  MRN: [id number]  Date of Service: 02/15/2023  Completed by: Lima Washington RN  Referral Date: 10/10/2022    Reason for Visit   Patient presents with    OPCM Chart Review     2/15/2023    OPCM RN First Follow-Up Attempt     2/15/2023  Eating lunch, will call back another time.    OPCM RN Second Follow-Up Attempt     2/23/2023  No answer, voice mailbox is full.  CM sent follow up attempt letter through the portal.      OPCM RN Third Follow-Up Attempt     3/1/2023  No answer, voice mailbox is full.  CM called patient's daughter, Afshan, to inform her of the audiology appointment on Monday 3/6 at 10:00 AM.  3/8/2023 Patient answered phone then call dropped.  CM called back and call went to voicemail.  CM left message.       Brief Summary: Patient's daughter, Afshan, states that the patient was doing really good with her legs until the patient's brother came in town from 2/5-2/9.  She states that he is a rule breaker and was letting eat whatever she wanted, let her legs dangle for 6 hours at a time.  He locked Afshan out of the apartment and told her that the patient didn't need her help.  After he left on 2/9, Afshan had to try to get the patient back on track.  The patient had swollen legs, but they aren't anymore because she has been elevating them like the doctor told her to do.  Afshan states that the patient is eating what she's supposed to now and feeling much better.  She is not short of breath or coughing.  She is taking her Lasix as prescribed.  She weighs herself daily and is 150 lbs.  She went to the audiology appointment REJI made on 3/6 and needs hearing aids.  She is waiting for them to come in. Patient states her legs are a tiny bit improved.  They are still draining a malodorous yellow/green drainage per ZEB VAZQUEZ's note from 3/7.  Patient is now going to go to Dr Cody Cox at the [facility] for treatment starting on Monday 3/13.  Patient is drinking Bob twice a day.  She is not running fever and is keeping her legs elevated to keep swelling to a minimum.  She is still taking antibiotics as prescribed by the doctor.  She see the infectious disease doctor on Monday when she goes to the wound care specialist.      CM also reviewed the signs and symptoms of CHF with patient and daughter including SOB; new or worsening cough especially if your cough is bloody or frothy; worsened swelling in your legs or ankles; fast or irregular heartbeat and instructed to notify the doctor if she has any of them.  CM gave the Financial Assistance Program phone number, [phone], to Afshan to call to see if they can get any assistance for the hearing aids that will cost $2000.  CM reviewed the signs and symptoms of a wound infection with Afshan and patient including fever above 100.4, swelling, drainage, pain or redness in or around wound, red streaks in skin around wound.  CM encouraged patient to continue the Bob and to take her antibiotics as prescribed until prescription is completed.    Next steps:   Follow up in four weeks  Follow up on 3 lb/5 lb rule  Follow up on S&S of CHF  Follow up on low sodium diet  Follow up on daily weights and trends  Follow up if avoiding processed foods  Follow up on self care steps for wounds  Follow up if still taking Bob  Follow up on S&S of wound infection

## 2023-02-16 ENCOUNTER — TELEPHONE (OUTPATIENT)
Dept: WOUND CARE | Facility: CLINIC | Age: 88
End: 2023-02-16
Payer: MEDICARE

## 2023-02-16 NOTE — TELEPHONE ENCOUNTER
HH nurse called to advised patient has large amount of drainage and is requesting verbal orders for absorbant pad and roll gauze to be used in addition  - provider gave verbal order to use ABD pad and roll gauze between the zinc oxide and coban layer.

## 2023-02-16 NOTE — TELEPHONE ENCOUNTER
Spoke with Iraida who advised patient has large amount of drainage and is calling daily to get dressing change.  HH nurse request verbal order to use ABD pads and roll gauze.  Provider Chandler gave verbal order to use ABD pads and roll gauze between zinc and coban layers.

## 2023-02-16 NOTE — TELEPHONE ENCOUNTER
----- Message from Sunitha Oliveira CMA sent at 2/16/2023  1:43 PM CST -----  Regarding: Verbal order needed  Contact: Toya Pittman from [facility] states the pt is calling on a daily basis to have dressing changed on her right leg pt states it oozing. Toya asked if she could get a verbal order to add dressing change supplies (gauze or ABD).      Please call [phone]    Thank you

## 2023-02-20 ENCOUNTER — TELEPHONE (OUTPATIENT)
Dept: WOUND CARE | Facility: CLINIC | Age: 88
End: 2023-02-20
Payer: MEDICARE

## 2023-02-20 ENCOUNTER — OFFICE VISIT (OUTPATIENT)
Dept: WOUND CARE | Facility: CLINIC | Age: 88
End: 2023-02-20
Payer: MEDICARE

## 2023-02-20 VITALS
DIASTOLIC BLOOD PRESSURE: 59 MMHG | BODY MASS INDEX: 26.48 KG/M2 | HEART RATE: 106 BPM | HEIGHT: 65 IN | WEIGHT: 158.94 LBS | TEMPERATURE: 100 F | SYSTOLIC BLOOD PRESSURE: 163 MMHG

## 2023-02-20 DIAGNOSIS — E13.42 DIABETIC POLYNEUROPATHY ASSOCIATED WITH OTHER SPECIFIED DIABETES MELLITUS: ICD-10-CM

## 2023-02-20 DIAGNOSIS — S81.801A MULTIPLE OPEN WOUNDS OF RIGHT LOWER EXTREMITY, INITIAL ENCOUNTER: ICD-10-CM

## 2023-02-20 DIAGNOSIS — Z86.73 HISTORY OF TIA (TRANSIENT ISCHEMIC ATTACK): ICD-10-CM

## 2023-02-20 DIAGNOSIS — N18.32 STAGE 3B CHRONIC KIDNEY DISEASE: ICD-10-CM

## 2023-02-20 DIAGNOSIS — I10 PRIMARY HYPERTENSION: ICD-10-CM

## 2023-02-20 DIAGNOSIS — I87.2 VENOUS INSUFFICIENCY OF BOTH LOWER EXTREMITIES: ICD-10-CM

## 2023-02-20 DIAGNOSIS — I50.32 CHRONIC DIASTOLIC HEART FAILURE: ICD-10-CM

## 2023-02-20 DIAGNOSIS — S81.802A OPEN WOUND OF LEFT LOWER EXTREMITY, INITIAL ENCOUNTER: Primary | ICD-10-CM

## 2023-02-20 PROCEDURE — 1111F PR DISCHARGE MEDS RECONCILED W/ CURRENT OUTPATIENT MED LIST: ICD-10-PCS | Mod: HCNC,CPTII,S$GLB,

## 2023-02-20 PROCEDURE — 99215 PR OFFICE/OUTPT VISIT, EST, LEVL V, 40-54 MIN: ICD-10-PCS | Mod: 25,HCNC,S$GLB,

## 2023-02-20 PROCEDURE — 99215 OFFICE O/P EST HI 40 MIN: CPT | Mod: 25,HCNC,S$GLB,

## 2023-02-20 PROCEDURE — 99999 PR PBB SHADOW E&M-EST. PATIENT-LVL II: CPT | Mod: PBBFAC,HCNC,,

## 2023-02-20 PROCEDURE — 1111F DSCHRG MED/CURRENT MED MERGE: CPT | Mod: HCNC,CPTII,S$GLB,

## 2023-02-20 PROCEDURE — 29581 APPL MULTLAYER CMPRN SYS LEG: CPT | Mod: 50,HCNC,S$GLB,

## 2023-02-20 PROCEDURE — 29581 PR APPL MLT-LAYER VENOUS WOUND COMPRESS BELOW KNEE: ICD-10-PCS | Mod: 50,HCNC,S$GLB,

## 2023-02-20 PROCEDURE — 99999 PR PBB SHADOW E&M-EST. PATIENT-LVL II: ICD-10-PCS | Mod: PBBFAC,HCNC,,

## 2023-02-20 NOTE — TELEPHONE ENCOUNTER
----- Message from Yola Dent sent at 2/20/2023 11:24 AM CST -----  Contact: pt @ [phone]  Jenniffer Jimenez calling regarding Patient Advice (message) for #pt is calling to speak with someone in the office, asking for all back

## 2023-02-20 NOTE — PROGRESS NOTES
Subjective:       Patient ID: Jenniffer Jimenez is a 89 y.o. female.    Chief Complaint: Wound Check    Patient presents for a reevaluation of a right lower extremity wound and a new left lower extremity wound. Patient has home health. She reports that they were not placing the right medication to her leg and it caused her wound to become worse. She reports excessive clear drainage. She states that she can only come to the wound care clinic once a week. Patient reports a new left lower extremity wound. She states that wound is draining clear fluid excessively. Denies fever, chills, erythema, warmth, purulent drainage, or pain.    ABIs 11/9/22:  Right HOLLY:1.17  Left HOLLY: 1.14      Review of Systems   Constitutional:  Negative for activity change, chills, diaphoresis, fatigue and fever.   HENT:  Positive for hearing loss.    Respiratory:  Negative for apnea, chest tightness and shortness of breath.    Cardiovascular:  Positive for leg swelling. Negative for chest pain and palpitations.   Musculoskeletal:  Negative for gait problem and joint swelling.   Skin:  Positive for wound. Negative for color change, pallor and rash.   Neurological:  Negative for syncope, weakness and numbness.   Psychiatric/Behavioral:  Negative for agitation. The patient is not nervous/anxious.    All other systems reviewed and are negative.    Objective:      Physical Exam  Vitals reviewed.   Constitutional:       General: She is not in acute distress.     Appearance: Normal appearance.   Cardiovascular:      Rate and Rhythm: Normal rate and regular rhythm.      Pulses: Normal pulses.   Pulmonary:      Effort: No respiratory distress.   Musculoskeletal:         General: No swelling.      Right lower leg: Edema present.   Skin:     General: Skin is warm and dry.      Capillary Refill: Capillary refill takes less than 2 seconds.      Findings: Wound present. No erythema.          Neurological:      General: No focal deficit present.       Mental Status: She is alert and oriented to person, place, and time.   Psychiatric:         Mood and Affect: Mood normal.         Behavior: Behavior normal.         Thought Content: Thought content normal.         Judgment: Judgment normal.       Assessment:       1. Open wound of left lower extremity, initial encounter    2. Multiple open wounds of right lower extremity, initial encounter    3. Venous insufficiency of both lower extremities    4. History of TIA (transient ischemic attack)    5. Primary hypertension    6. Chronic diastolic heart failure    7. Diabetic polyneuropathy associated with other specified diabetes mellitus    8. Stage 3b chronic kidney disease    9. BMI 31.0-31.9,adult             Altered Skin Integrity Left Calf (Active)       Altered Skin Integrity Present on Admission:    Side: Left   Orientation:    Location: Calf   Wound Number:    Is this injury device related?:    Primary Wound Type:    Description of Altered Skin Integrity:    Ankle-Brachial Index:    Pulses:    Removal Indication and Assessment:    Wound Outcome:    (Retired) Wound Length (cm):    (Retired) Wound Width (cm):    (Retired) Depth (cm):    Wound Description (Comments):    Removal Indications:    Wound Image   02/20/23 1344   Dressing Appearance Dry;Intact;Clean;Moist drainage;Saturated 02/20/23 1344   Drainage Amount Copious 02/20/23 1344   Drainage Characteristics/Odor Serous 02/20/23 1344   Red (%), Wound Tissue Color 100 % 02/20/23 1344   Periwound Area Intact;Edematous;Pink 02/20/23 1344   Wound Length (cm) 9.5 cm 02/20/23 1344   Wound Width (cm) 5 cm 02/20/23 1344   Wound Depth (cm) 0.1 cm 02/20/23 1344   Wound Volume (cm^3) 4.75 cm^3 02/20/23 1344   Wound Surface Area (cm^2) 47.5 cm^2 02/20/23 1344   Care Cleansed with:;Soap and water 02/20/23 1344   Dressing Applied;Calcium alginate;Compression wrap;Absorptive Pad;Other (comment) 02/20/23 1344   Periwound Care Skin barrier film applied 02/20/23 1344    Compression Two layer compression 02/20/23 1344            Altered Skin Integrity Right anterior;lower;proximal;medial Leg (Active)       Altered Skin Integrity Present on Admission:    Side: Right   Orientation: anterior;lower;proximal;medial   Location: Leg   Wound Number:    Is this injury device related?:    Primary Wound Type:    Description of Altered Skin Integrity:    Ankle-Brachial Index:    Pulses:    Removal Indication and Assessment:    Wound Outcome:    (Retired) Wound Length (cm):    (Retired) Wound Width (cm):    (Retired) Depth (cm):    Wound Description (Comments):    Removal Indications:    Wound Image   02/20/23 1344   Dressing Appearance Dry;Intact;Clean;Moist drainage 02/20/23 1344   Drainage Amount Large 02/20/23 1344   Drainage Characteristics/Odor Serous 02/20/23 1344   Red (%), Wound Tissue Color 100 % 02/20/23 1344   Periwound Area Intact;Edematous;Pink 02/20/23 1344   Wound Length (cm) 3.1 cm 02/20/23 1344   Wound Width (cm) 9.4 cm 02/20/23 1344   Wound Depth (cm) 0.1 cm 02/20/23 1344   Wound Volume (cm^3) 2.914 cm^3 02/20/23 1344   Wound Surface Area (cm^2) 29.14 cm^2 02/20/23 1344   Care Cleansed with:;Soap and water 02/20/23 1344   Dressing Applied;Calcium alginate;Compression wrap;Absorptive Pad;Other (comment) 02/20/23 1344   Periwound Care Skin barrier film applied 02/20/23 1344   Compression Two layer compression 02/20/23 1344            Altered Skin Integrity Right anterior;lower;distal;lateral;medial;posterior Leg (Active)       Altered Skin Integrity Present on Admission:    Side: Right   Orientation: anterior;lower;distal;lateral;medial;posterior   Location: Leg   Wound Number:    Is this injury device related?:    Primary Wound Type:    Description of Altered Skin Integrity:    Ankle-Brachial Index:    Pulses:    Removal Indication and Assessment:    Wound Outcome:    (Retired) Wound Length (cm):    (Retired) Wound Width (cm):    (Retired) Depth (cm):    Wound Description  (Comments):    Removal Indications:    Wound Image     02/20/23 1344   Dressing Appearance Dry;Intact;Clean;Moist drainage;Saturated 02/20/23 1344   Drainage Amount Copious 02/20/23 1344   Drainage Characteristics/Odor Serous 02/20/23 1344   Red (%), Wound Tissue Color 100 % 02/20/23 1344   Periwound Area Intact;Edematous;Pink 02/20/23 1344   Wound Length (cm) 19.4 cm 02/20/23 1344   Wound Width (cm) 20.4 cm 02/20/23 1344   Wound Depth (cm) 0.1 cm 02/20/23 1344   Wound Volume (cm^3) 39.576 cm^3 02/20/23 1344   Wound Surface Area (cm^2) 395.76 cm^2 02/20/23 1344   Care Cleansed with:;Soap and water 02/20/23 1344   Dressing Applied;Calcium alginate;Compression wrap;Absorptive Pad;Other (comment) 02/20/23 1344   Periwound Care Skin barrier film applied 02/20/23 1344   Compression Two layer compression 02/20/23 1344      Jenniffer was seen in the clinic room and placed in the supine position on the treatment table.  The wound dressing was removed and the leg was cleansed with Easi-clense sponges and dried thoroughly. No odor, erythema, green drainage, or warmth noted from patient's baseline.     Plan of Care: Calmoseptine to periwound, aquacel to wound bed, drawtex, mextra pad, and a 2 layer zinc coflex wrap to bilateral lower extremities.  The patient's feet were positioned at a 90 degree angle.  The wrap was applied using a spiral technique avoiding creases or folds.  The wrap was started behind the first metatarsal and ended below the tibial tubercle of the knee.  There was overlap of each turn half the width of the previous turn.    Plan:       Bilateral lower extremities dressed as detailed above.  Patient was instructed to not get the dressings wet and to use cast covers for showering.  Should the dressing become wet, she is to remove it, place a moist dressing over the wound, cover with gauze and roll gauze and use ace wraps for compression and to secure bandages.  She should then notify this office or home health as  soon as possible to have a new dressing applied.  Instructed patient to remove wrap if she notices tingling or coldness to her right lower extremity or if her toes become white, blue, or cold.    Discussed drainage control. Patient can only come to clinic once a week. Will see patient on Tuesdays and home health will come out at the end of the week.    Faxed home health care orders:  HOME HEALTH WOUND CARE ORDERS  D/C previous orders  Wound care and nurse visits: 1X a week (at the end of the week) and PRN complications  1. Cleanse wounds with saline, mild soap and water, or wound cleanser  2.Apply: Calmoseptine to periwound, aquacel to wound beds, drawtex, mextra pads, and a two layer zinc or calamine coflex wrap to bilateral lower extremities.  Two layer coflex wrap from below her first metatarsal and ending below the tibial tubercle of the knee and covering her ankle  3.Cover with appropriate cover dressing and contact the office for any substitutions in dressings  Monitor for infection and teach patient/caregiver signs and symptoms    Discussed nutrition and the role of protein in wound healing with the patient. Instructed patient to continue to optimize protein for wound healing within CKD protein limitations.     Discussed offloading wounds with patient. Instructed patient to place pillows, rolled towels, or rolled linens under bilateral knees and ankles to relieve pressure off of wounds.     Instructed patient to elevate legs whenever sedentary, to follow a low salt diet, and take lasix as prescribed.      All questions were answered.  Written and verbal instructions given to patient  RTC in 1 week      Clara Arteaga PA-C                        50  minutes of total time spent on the encounter, which includes face to face time and non-face to face time preparing to see the patient (eg, review of tests), Obtaining and/or reviewing separately obtained history, Documenting clinical information in the electronic or  other health record, Independently interpreting results (not separately reported) and communicating results to the patient/family/caregiver, or Care coordination (not separately reported).

## 2023-02-24 ENCOUNTER — TELEPHONE (OUTPATIENT)
Dept: WOUND CARE | Facility: CLINIC | Age: 88
End: 2023-02-24
Payer: MEDICARE

## 2023-02-24 NOTE — TELEPHONE ENCOUNTER
----- Message from Suzy Garcia sent at 2/24/2023  4:08 PM CST -----  Contact: Vinod Brock is calling in regards to Pt care. Pt leg wounds are getting worse and also have an odor. Pt arm wound has an odor. Pt is also in pain at a level 10.  Please adv Nurse       Confirmed contact below:   Contact Name:[facility]   Phone Number: [phone]

## 2023-02-24 NOTE — TELEPHONE ENCOUNTER
Returned call and spoke with  nurse, Vinod, who advised when she removed the dressings some of her skin peeled off, and she has an odor.   Nurse instructed patient to go to ED, patient declined.  Patient also with complaints of pain 10 out of 10.  Nurse instructed to document ED refusal and continue with current plan of care.  Patient has appointment this Tuesday, 2/28/2023 with provider Chandler.

## 2023-02-27 RX ORDER — OFLOXACIN 3 MG/ML
1 SOLUTION/ DROPS OPHTHALMIC 4 TIMES DAILY
Qty: 5 ML | Refills: 0 | Status: SHIPPED | OUTPATIENT
Start: 2023-02-27 | End: 2023-03-06

## 2023-02-28 ENCOUNTER — OFFICE VISIT (OUTPATIENT)
Dept: WOUND CARE | Facility: CLINIC | Age: 88
End: 2023-02-28
Payer: MEDICARE

## 2023-02-28 ENCOUNTER — TELEPHONE (OUTPATIENT)
Dept: INTERNAL MEDICINE | Facility: CLINIC | Age: 88
End: 2023-02-28
Payer: MEDICARE

## 2023-02-28 ENCOUNTER — OFFICE VISIT (OUTPATIENT)
Dept: INFECTIOUS DISEASES | Facility: CLINIC | Age: 88
End: 2023-02-28
Payer: MEDICARE

## 2023-02-28 VITALS
HEIGHT: 65 IN | DIASTOLIC BLOOD PRESSURE: 60 MMHG | TEMPERATURE: 99 F | SYSTOLIC BLOOD PRESSURE: 127 MMHG | WEIGHT: 156.75 LBS | HEART RATE: 81 BPM | BODY MASS INDEX: 26.12 KG/M2

## 2023-02-28 VITALS
BODY MASS INDEX: 26.12 KG/M2 | SYSTOLIC BLOOD PRESSURE: 127 MMHG | HEART RATE: 90 BPM | WEIGHT: 156.75 LBS | HEIGHT: 65 IN | TEMPERATURE: 99 F | DIASTOLIC BLOOD PRESSURE: 60 MMHG

## 2023-02-28 DIAGNOSIS — S81.801A MULTIPLE OPEN WOUNDS OF RIGHT LOWER EXTREMITY, INITIAL ENCOUNTER: Primary | ICD-10-CM

## 2023-02-28 DIAGNOSIS — N18.32 STAGE 3B CHRONIC KIDNEY DISEASE: ICD-10-CM

## 2023-02-28 DIAGNOSIS — Z86.73 HISTORY OF TIA (TRANSIENT ISCHEMIC ATTACK): ICD-10-CM

## 2023-02-28 DIAGNOSIS — I50.32 CHRONIC DIASTOLIC HEART FAILURE: ICD-10-CM

## 2023-02-28 DIAGNOSIS — L03.90 CELLULITIS, UNSPECIFIED CELLULITIS SITE: ICD-10-CM

## 2023-02-28 DIAGNOSIS — S81.802A OPEN WOUND OF LEFT LOWER EXTREMITY, INITIAL ENCOUNTER: ICD-10-CM

## 2023-02-28 DIAGNOSIS — L03.115 CELLULITIS OF RIGHT LOWER EXTREMITY: ICD-10-CM

## 2023-02-28 DIAGNOSIS — I10 PRIMARY HYPERTENSION: ICD-10-CM

## 2023-02-28 DIAGNOSIS — E13.42 DIABETIC POLYNEUROPATHY ASSOCIATED WITH OTHER SPECIFIED DIABETES MELLITUS: ICD-10-CM

## 2023-02-28 DIAGNOSIS — I87.2 VENOUS INSUFFICIENCY OF BOTH LOWER EXTREMITIES: ICD-10-CM

## 2023-02-28 LAB
GRAM STN SPEC: NORMAL
GRAM STN SPEC: NORMAL

## 2023-02-28 PROCEDURE — 1101F PT FALLS ASSESS-DOCD LE1/YR: CPT | Mod: HCNC,CPTII,S$GLB, | Performed by: REGISTERED NURSE

## 2023-02-28 PROCEDURE — 99214 PR OFFICE/OUTPT VISIT, EST, LEVL IV, 30-39 MIN: ICD-10-PCS | Mod: HCNC,S$GLB,, | Performed by: REGISTERED NURSE

## 2023-02-28 PROCEDURE — 99215 PR OFFICE/OUTPT VISIT, EST, LEVL V, 40-54 MIN: ICD-10-PCS | Mod: HCNC,S$GLB,,

## 2023-02-28 PROCEDURE — 99999 PR PBB SHADOW E&M-EST. PATIENT-LVL III: ICD-10-PCS | Mod: PBBFAC,HCNC,, | Performed by: REGISTERED NURSE

## 2023-02-28 PROCEDURE — 1101F PR PT FALLS ASSESS DOC 0-1 FALLS W/OUT INJ PAST YR: ICD-10-PCS | Mod: HCNC,CPTII,S$GLB,

## 2023-02-28 PROCEDURE — 3288F FALL RISK ASSESSMENT DOCD: CPT | Mod: HCNC,CPTII,S$GLB,

## 2023-02-28 PROCEDURE — 99999 PR PBB SHADOW E&M-EST. PATIENT-LVL III: ICD-10-PCS | Mod: PBBFAC,HCNC,,

## 2023-02-28 PROCEDURE — 1125F PR PAIN SEVERITY QUANTIFIED, PAIN PRESENT: ICD-10-PCS | Mod: HCNC,CPTII,S$GLB,

## 2023-02-28 PROCEDURE — 1111F DSCHRG MED/CURRENT MED MERGE: CPT | Mod: HCNC,CPTII,S$GLB,

## 2023-02-28 PROCEDURE — 3288F PR FALLS RISK ASSESSMENT DOCUMENTED: ICD-10-PCS | Mod: HCNC,CPTII,S$GLB,

## 2023-02-28 PROCEDURE — 1101F PR PT FALLS ASSESS DOC 0-1 FALLS W/OUT INJ PAST YR: ICD-10-PCS | Mod: HCNC,CPTII,S$GLB, | Performed by: REGISTERED NURSE

## 2023-02-28 PROCEDURE — 1111F PR DISCHARGE MEDS RECONCILED W/ CURRENT OUTPATIENT MED LIST: ICD-10-PCS | Mod: HCNC,CPTII,S$GLB, | Performed by: REGISTERED NURSE

## 2023-02-28 PROCEDURE — 87186 SC STD MICRODIL/AGAR DIL: CPT | Mod: 59,HCNC

## 2023-02-28 PROCEDURE — 1111F DSCHRG MED/CURRENT MED MERGE: CPT | Mod: HCNC,CPTII,S$GLB, | Performed by: REGISTERED NURSE

## 2023-02-28 PROCEDURE — 3288F FALL RISK ASSESSMENT DOCD: CPT | Mod: HCNC,CPTII,S$GLB, | Performed by: REGISTERED NURSE

## 2023-02-28 PROCEDURE — 1125F AMNT PAIN NOTED PAIN PRSNT: CPT | Mod: HCNC,CPTII,S$GLB, | Performed by: REGISTERED NURSE

## 2023-02-28 PROCEDURE — 1159F PR MEDICATION LIST DOCUMENTED IN MEDICAL RECORD: ICD-10-PCS | Mod: HCNC,CPTII,S$GLB, | Performed by: REGISTERED NURSE

## 2023-02-28 PROCEDURE — 1111F PR DISCHARGE MEDS RECONCILED W/ CURRENT OUTPATIENT MED LIST: ICD-10-PCS | Mod: HCNC,CPTII,S$GLB,

## 2023-02-28 PROCEDURE — 1159F MED LIST DOCD IN RCRD: CPT | Mod: HCNC,CPTII,S$GLB, | Performed by: REGISTERED NURSE

## 2023-02-28 PROCEDURE — 99999 PR PBB SHADOW E&M-EST. PATIENT-LVL III: CPT | Mod: PBBFAC,HCNC,,

## 2023-02-28 PROCEDURE — 1125F PR PAIN SEVERITY QUANTIFIED, PAIN PRESENT: ICD-10-PCS | Mod: HCNC,CPTII,S$GLB, | Performed by: REGISTERED NURSE

## 2023-02-28 PROCEDURE — 1125F AMNT PAIN NOTED PAIN PRSNT: CPT | Mod: HCNC,CPTII,S$GLB,

## 2023-02-28 PROCEDURE — 3288F PR FALLS RISK ASSESSMENT DOCUMENTED: ICD-10-PCS | Mod: HCNC,CPTII,S$GLB, | Performed by: REGISTERED NURSE

## 2023-02-28 PROCEDURE — 87102 FUNGUS ISOLATION CULTURE: CPT | Mod: HCNC

## 2023-02-28 PROCEDURE — 87077 CULTURE AEROBIC IDENTIFY: CPT | Mod: 59,HCNC

## 2023-02-28 PROCEDURE — 99215 OFFICE O/P EST HI 40 MIN: CPT | Mod: HCNC,S$GLB,,

## 2023-02-28 PROCEDURE — 87070 CULTURE OTHR SPECIMN AEROBIC: CPT | Mod: HCNC

## 2023-02-28 PROCEDURE — 87205 SMEAR GRAM STAIN: CPT | Mod: HCNC

## 2023-02-28 PROCEDURE — 99214 OFFICE O/P EST MOD 30 MIN: CPT | Mod: HCNC,S$GLB,, | Performed by: REGISTERED NURSE

## 2023-02-28 PROCEDURE — 99999 PR PBB SHADOW E&M-EST. PATIENT-LVL III: CPT | Mod: PBBFAC,HCNC,, | Performed by: REGISTERED NURSE

## 2023-02-28 PROCEDURE — 1101F PT FALLS ASSESS-DOCD LE1/YR: CPT | Mod: HCNC,CPTII,S$GLB,

## 2023-02-28 PROCEDURE — 87075 CULTR BACTERIA EXCEPT BLOOD: CPT | Mod: HCNC

## 2023-02-28 RX ORDER — AMOXICILLIN AND CLAVULANATE POTASSIUM 875; 125 MG/1; MG/1
1 TABLET, FILM COATED ORAL 2 TIMES DAILY
Qty: 28 TABLET | Refills: 0 | Status: SHIPPED | OUTPATIENT
Start: 2023-02-28 | End: 2023-03-20 | Stop reason: ALTCHOICE

## 2023-02-28 RX ORDER — AMOXICILLIN 500 MG/1
500 TABLET, FILM COATED ORAL EVERY 12 HOURS
Qty: 14 TABLET | Refills: 0 | Status: CANCELLED | OUTPATIENT
Start: 2023-02-28 | End: 2023-03-07

## 2023-02-28 NOTE — TELEPHONE ENCOUNTER
----- Message from Lima Washington RN sent at 2/6/2023 11:44 AM CST -----  Regarding: ENT referral  Good morning,    I sent a message last week requesting a referral for ENT so we can make an audiology appointment for the patient.  Could someone please place an ENT referral for the patient.  She has to see ENT prior to an audiology appointment.    Thank you,    Rona Washington RN  RN Case Manager  Outpatient Complex Care Management  [facility]  [phone]  [email or web address]

## 2023-02-28 NOTE — PROGRESS NOTES
Subjective:       Patient ID: Jenniffer Jimenez is a 89 y.o. female.    Chief Complaint: Hospital Follow Up    HPI    Ms Jimenez is a 89 year female with a PMH of HLD, HTN, AF, CKD, HFpEF, venous insufficiency who presents to clinic following a wound care follow up. Pt has been followed previously for RLE venous ulcers with suspicion of cellulitis. She was last treated in December with Cipro but developed a drug rash. Pt states she has home health and wound care that sees her ~3 times a week. She states she is unable to change the dressing herself. She states her RLE has had an increase of clear drainage and is saturated prior to the next dressing change. Pt denies fever, body aches, chills. Denies NVD. Denies pus. Reports warmth and swelling to wound. Pt reporting pain to RLE wound following dressing change during wound care. Pt not agreeable to allowing me to undress to assess wound. Pt states she lives in an assisted living facility in which she can request help when needed but noted limited family support. Pt stated she would like to have home health see her more frequently d/t her being unable to change her own dressings. Pt noted that she is unable to bathe by herself without keeping wound dry as well.     To note, pt was seen previously by wound care. Per note, pt was with foul smelling, green drainage on RLE. Pt expressed increased pain during their and my visit. Drainage was cultured per DENISE.     Photos from wound care visit:                                     Review of Systems   Constitutional:  Negative for chills, diaphoresis, fatigue and fever.   HENT: Negative.          Hard of hearing   Eyes: Negative.    Respiratory:  Negative for cough and shortness of breath.    Cardiovascular:  Positive for leg swelling. Negative for chest pain.   Gastrointestinal:  Negative for abdominal pain, constipation and diarrhea.   Genitourinary:  Negative for dysuria.   Neurological:  Positive for weakness. Negative  for dizziness.   Psychiatric/Behavioral:  Negative for agitation and confusion.        Objective:      Physical Exam  Vitals reviewed.   Constitutional:       General: She is not in acute distress.     Appearance: Normal appearance. She is well-developed and normal weight. She is not ill-appearing, toxic-appearing or diaphoretic.   HENT:      Head: Normocephalic and atraumatic.      Nose: Nose normal.      Mouth/Throat:      Dentition: Normal dentition. Does not have dentures. No dental caries or dental abscesses.   Eyes:      Conjunctiva/sclera: Conjunctivae normal.   Cardiovascular:      Rate and Rhythm: Normal rate and regular rhythm.   Pulmonary:      Effort: Pulmonary effort is normal. No respiratory distress.   Abdominal:      General: Bowel sounds are normal. There is no distension.      Palpations: Abdomen is soft.      Tenderness: There is no abdominal tenderness.   Musculoskeletal:      Cervical back: Normal range of motion.      Right lower leg: Edema present.      Left lower leg: Edema present.   Skin:     General: Skin is warm and dry.      Findings: Lesion present. No erythema or rash.      Comments: Bilateral lower leg wounds, see media. Foul smelling odor noted to RLE. Mild warmth/swelling noted to RLE. Both legs wrapped in ace wrap. Pt refused to undress in order to assess.    Neurological:      Mental Status: She is alert and oriented to person, place, and time.      Motor: Weakness present.      Gait: Gait abnormal.   Psychiatric:         Behavior: Behavior normal.         Thought Content: Thought content normal.         Judgment: Judgment normal.       Assessment:       Problem List Items Addressed This Visit    None  Visit Diagnoses       Cellulitis, unspecified cellulitis site        Relevant Medications    amoxicillin-clavulanate 875-125mg (AUGMENTIN) 875-125 mg per tablet            Plan:       Recommendations:   - Start Augmentin 875/125 BID x7d  - Will follow culture   - leg elevation   -  increase wound care with HH  - Discussed with patient that infection is a complication of her wound, not the cause of her wound, and antibiotics will not resolve her wound. Discussed that our goal is to focus on wound care/leg elevation to improve her wound.   - Follow up in 1 week. Can see pt with wound care on 3/7/23 at 1pm as pt has time constraints with transportation.            45 minutes of total time was spent on this encounter, which includes face to face time and non-face to face time preparing to see the patient (eg, review of tests), Obtaining and/or reviewing separately obtained history, documenting clinical information in the electronic or other health record, independently interpreting results (not separately reported) and communicating results to the patient/family/caregiver, or care coordination (not separately reported).

## 2023-02-28 NOTE — PROGRESS NOTES
Subjective:       Patient ID: Jenniffer Jimenez is a 89 y.o. female.    Chief Complaint: Wound Check    Patient presents for a reevaluation of a bilateral lower extremity wounds. Patient has home health. She reports that they are not wrapping her legs correctly. Pt reports a new odor to her right lower extremity. She has an appointment with ID for a hospital follow up this afternoon. Denies fever, chills, erythema, warmth, or pain.    ABIs 11/9/22:  Right HOLLY:1.17  Left HOLLY: 1.14      Review of Systems   Constitutional:  Negative for activity change, chills, diaphoresis, fatigue and fever.   HENT:  Positive for hearing loss.    Respiratory:  Negative for apnea, chest tightness and shortness of breath.    Cardiovascular:  Positive for leg swelling. Negative for chest pain and palpitations.   Musculoskeletal:  Negative for gait problem and joint swelling.   Skin:  Positive for wound. Negative for color change, pallor and rash.   Neurological:  Negative for syncope, weakness and numbness.   Psychiatric/Behavioral:  Negative for agitation. The patient is not nervous/anxious.    All other systems reviewed and are negative.    Objective:      Physical Exam  Vitals reviewed.   Constitutional:       General: She is not in acute distress.     Appearance: Normal appearance.   Cardiovascular:      Rate and Rhythm: Normal rate and regular rhythm.      Pulses: Normal pulses.   Pulmonary:      Effort: No respiratory distress.   Musculoskeletal:         General: No swelling.      Right lower leg: Edema present.   Skin:     General: Skin is warm and dry.      Capillary Refill: Capillary refill takes less than 2 seconds.      Findings: Wound present. No erythema.          Neurological:      General: No focal deficit present.      Mental Status: She is alert and oriented to person, place, and time.   Psychiatric:         Mood and Affect: Mood normal.         Behavior: Behavior normal.         Thought Content: Thought content  normal.         Judgment: Judgment normal.       Assessment:       1. Multiple open wounds of right lower extremity, initial encounter    2. Open wound of left lower extremity, initial encounter    3. Venous insufficiency of both lower extremities    4. History of TIA (transient ischemic attack)    5. Primary hypertension    6. Chronic diastolic heart failure    7. Diabetic polyneuropathy associated with other specified diabetes mellitus    8. Stage 3b chronic kidney disease    9. BMI 26.0-26.9,adult    10. Cellulitis of right lower extremity             Altered Skin Integrity Right lower;medial;proximal Leg (Active)       Altered Skin Integrity Present on Admission:    Side: Right   Orientation: lower;medial;proximal   Location: Leg   Wound Number:    Is this injury device related?:    Primary Wound Type:    Description of Altered Skin Integrity:    Ankle-Brachial Index:    Pulses:    Removal Indication and Assessment:    Wound Outcome:    (Retired) Wound Length (cm):    (Retired) Wound Width (cm):    (Retired) Depth (cm):    Wound Description (Comments):    Removal Indications:    Wound Image   02/28/23 1053   Dressing Appearance Dry;Intact;Clean;Moist drainage 02/28/23 1053   Drainage Amount Large 02/28/23 1053   Drainage Characteristics/Odor Green;Malodorous 02/28/23 1053   Red (%), Wound Tissue Color 100 % 02/28/23 1053   Periwound Area Intact;Edematous;Pink 02/28/23 1053   Wound Length (cm) 1 cm 02/28/23 1053   Wound Width (cm) 1.3 cm 02/28/23 1053   Wound Depth (cm) 0.1 cm 02/28/23 1053   Wound Volume (cm^3) 0.13 cm^3 02/28/23 1053   Wound Surface Area (cm^2) 1.3 cm^2 02/28/23 1053   Care Cleansed with:;Soap and water 02/28/23 1053   Dressing Applied;Calcium alginate;Silver;Absorptive Pad;Other (comment);Rolled gauze 02/28/23 1053   Periwound Care Skin barrier film applied 02/28/23 1053            Altered Skin Integrity Right anterior;lower;posterior;medial;lateral Leg (Active)       Altered Skin Integrity  Present on Admission:    Side: Right   Orientation: anterior;lower;posterior;medial;lateral   Location: Leg   Wound Number:    Is this injury device related?:    Primary Wound Type:    Description of Altered Skin Integrity:    Ankle-Brachial Index:    Pulses:    Removal Indication and Assessment:    Wound Outcome:    (Retired) Wound Length (cm):    (Retired) Wound Width (cm):    (Retired) Depth (cm):    Wound Description (Comments):    Removal Indications:    Wound Image      02/28/23 1053   Dressing Appearance Dry;Intact;Clean;Moist drainage;Saturated 02/28/23 1053   Drainage Amount Copious 02/28/23 1053   Drainage Characteristics/Odor Yellow;Green;Malodorous 02/28/23 1053   Red (%), Wound Tissue Color 100 % 02/28/23 1053   Periwound Area Intact;Edematous;Pink 02/28/23 1053   Wound Length (cm) 22.1 cm 02/28/23 1053   Wound Width (cm) 28.3 cm 02/28/23 1053   Wound Depth (cm) 0.1 cm 02/28/23 1053   Wound Volume (cm^3) 62.543 cm^3 02/28/23 1053   Wound Surface Area (cm^2) 625.43 cm^2 02/28/23 1053   Care Cleansed with:;Soap and water 02/28/23 1053   Dressing Applied;Calcium alginate;Silver;Absorptive Pad;Rolled gauze;Other (comment) 02/28/23 1053   Periwound Care Skin barrier film applied 02/28/23 1053            Altered Skin Integrity Left posterior Leg (Active)       Altered Skin Integrity Present on Admission:    Side: Left   Orientation: posterior   Location: Leg   Wound Number:    Is this injury device related?:    Primary Wound Type:    Description of Altered Skin Integrity:    Ankle-Brachial Index:    Pulses:    Removal Indication and Assessment:    Wound Outcome:    (Retired) Wound Length (cm):    (Retired) Wound Width (cm):    (Retired) Depth (cm):    Wound Description (Comments):    Removal Indications:    Wound Image     02/28/23 1053   Dressing Appearance Dry;Intact;Clean;Moist drainage 02/28/23 1053   Drainage Amount Moderate 02/28/23 1053   Drainage Characteristics/Odor Yellow 02/28/23 1053   Red (%),  Wound Tissue Color 100 % 02/28/23 1053   Periwound Area Intact;Edematous;Pink 02/28/23 1053   Wound Length (cm) 9.6 cm 02/28/23 1053   Wound Width (cm) 15.2 cm 02/28/23 1053   Wound Depth (cm) 0.1 cm 02/28/23 1053   Wound Volume (cm^3) 14.592 cm^3 02/28/23 1053   Wound Surface Area (cm^2) 145.92 cm^2 02/28/23 1053   Care Cleansed with:;Soap and water 02/28/23 1053   Dressing Applied;Calcium alginate;Silver;Absorptive Pad;Rolled gauze;Other (comment) 02/28/23 1053   Periwound Care Skin barrier film applied 02/28/23 1053      Jenniffer was seen in the clinic room and placed in the supine position on the treatment table.  The wound dressings were removed and the legs were cleansed with Easi-clense sponges and dried thoroughly. Odor and green drainage noted to right lower extremity. No erythema or warmth appreciated. Cultured right lower extremity wound.    Plan of Care: Calmoseptine to periwound, aquacel Ag to wound bed, drawtex, mextra pad, kerlix, and 1 loose ACE wrap to hold bandages in place.     Plan:       Bilateral lower extremities dressed as detailed above.  Patient was instructed to not get the dressings wet and to use cast covers for showering.  Should the dressing become wet, she is to remove it, place a moist dressing over the wound, cover with gauze and roll gauze and use ace wraps for compression and to secure bandages.  She should then notify this office or home health as soon as possible to have a new dressing applied.    Discussed drainage control. Patient can come to clinic twice a week. Will see patient on Monday and Wednesday and home health will come out at the end of the week.    Discussed cellulitis with patient. Gram stain, aerobic culture, anaerobic culture, and fungal culture ordered. Concerned for pseudomonas infection. Patient previously broke out in a full body rash due to Ciprofloxacin. Will defer to ID for oral antibiotics. Applied topical antimicrobial wound dressings.     Faxed home health  care orders:  HOME HEALTH WOUND CARE ORDERS  D/C previous orders  Wound care and nurse visits: 2X a week and PRN complications  1. Cleanse wounds with saline, mild soap and water, or wound cleanser  2.Apply: Calmoseptine to periwound, aquacel Ag to wound beds, drawtex, mextra pads, kerlix, and 1 loose ACE wrap to hold bandages in place from below her first metatarsal and ending below the tibial tubercle of the knee and covering her ankle  3.Cover with appropriate cover dressing and contact the office for any substitutions in dressings  Monitor for infection and teach patient/caregiver signs and symptoms    Discussed nutrition and the role of protein in wound healing with the patient. Instructed patient to continue to optimize protein for wound healing within CKD protein limitations.     Discussed offloading wounds with patient. Instructed patient to place pillows, rolled towels, or rolled linens under bilateral knees and ankles to relieve pressure off of wounds.     Instructed patient to elevate legs whenever sedentary, to follow a low salt diet, and take lasix as prescribed.      All questions were answered.  Written and verbal instructions given to patient  RTC in 1 week      Clara Arteaga PA-C                        65  minutes of total time spent on the encounter, which includes face to face time and non-face to face time preparing to see the patient (eg, review of tests), Obtaining and/or reviewing separately obtained history, Documenting clinical information in the electronic or other health record, Independently interpreting results (not separately reported) and communicating results to the patient/family/caregiver, or Care coordination (not separately reported).

## 2023-03-02 ENCOUNTER — TELEPHONE (OUTPATIENT)
Dept: WOUND CARE | Facility: CLINIC | Age: 88
End: 2023-03-02
Payer: MEDICARE

## 2023-03-02 ENCOUNTER — OFFICE VISIT (OUTPATIENT)
Dept: PODIATRY | Facility: CLINIC | Age: 88
End: 2023-03-02
Payer: MEDICARE

## 2023-03-02 ENCOUNTER — TELEPHONE (OUTPATIENT)
Dept: INFECTIOUS DISEASES | Facility: HOSPITAL | Age: 88
End: 2023-03-02
Payer: MEDICARE

## 2023-03-02 VITALS
HEART RATE: 87 BPM | RESPIRATION RATE: 19 BRPM | DIASTOLIC BLOOD PRESSURE: 68 MMHG | HEIGHT: 65 IN | WEIGHT: 156 LBS | BODY MASS INDEX: 25.99 KG/M2 | SYSTOLIC BLOOD PRESSURE: 133 MMHG

## 2023-03-02 DIAGNOSIS — B35.1 ONYCHOMYCOSIS DUE TO DERMATOPHYTE: Primary | ICD-10-CM

## 2023-03-02 DIAGNOSIS — G60.9 IDIOPATHIC PERIPHERAL NEUROPATHY: ICD-10-CM

## 2023-03-02 DIAGNOSIS — R53.81 DEBILITY: ICD-10-CM

## 2023-03-02 PROCEDURE — 99999 PR PBB SHADOW E&M-EST. PATIENT-LVL III: ICD-10-PCS | Mod: PBBFAC,HCNC,, | Performed by: PODIATRIST

## 2023-03-02 PROCEDURE — 1125F AMNT PAIN NOTED PAIN PRSNT: CPT | Mod: HCNC,CPTII,S$GLB, | Performed by: PODIATRIST

## 2023-03-02 PROCEDURE — 99999 PR PBB SHADOW E&M-EST. PATIENT-LVL III: CPT | Mod: PBBFAC,HCNC,, | Performed by: PODIATRIST

## 2023-03-02 PROCEDURE — 1159F MED LIST DOCD IN RCRD: CPT | Mod: HCNC,CPTII,S$GLB, | Performed by: PODIATRIST

## 2023-03-02 PROCEDURE — 1159F PR MEDICATION LIST DOCUMENTED IN MEDICAL RECORD: ICD-10-PCS | Mod: HCNC,CPTII,S$GLB, | Performed by: PODIATRIST

## 2023-03-02 PROCEDURE — 99499 UNLISTED E&M SERVICE: CPT | Mod: HCNC,S$GLB,, | Performed by: PODIATRIST

## 2023-03-02 PROCEDURE — 1125F PR PAIN SEVERITY QUANTIFIED, PAIN PRESENT: ICD-10-PCS | Mod: HCNC,CPTII,S$GLB, | Performed by: PODIATRIST

## 2023-03-02 PROCEDURE — 11721 DEBRIDE NAIL 6 OR MORE: CPT | Mod: Q9,HCNC,S$GLB, | Performed by: PODIATRIST

## 2023-03-02 PROCEDURE — 99499 NO LOS: ICD-10-PCS | Mod: HCNC,S$GLB,, | Performed by: PODIATRIST

## 2023-03-02 PROCEDURE — 11721 PR DEBRIDEMENT OF NAILS, 6 OR MORE: ICD-10-PCS | Mod: Q9,HCNC,S$GLB, | Performed by: PODIATRIST

## 2023-03-02 NOTE — TELEPHONE ENCOUNTER
----- Message from Lili Alpesh sent at 3/1/2023  4:42 PM CST -----  Regarding: Appt  Contact: Pt [phone]  Patient called requesting a call back regarding changing the bandages on her wound on 03/02/23 because of leakage states she will be at facility for appt Please call to discuss further

## 2023-03-02 NOTE — TELEPHONE ENCOUNTER
Call made to Ms Jimenez to discuss recent wound culture report. No answer. Mailbox is full. Currently wound is growing pseudomonas, gent resistant, and GNR. Will continue to follow and plan to discuss at follow up next Monday. Will discuss with pt ordering labs to further assess as well.     Case discussed with ID staff, Dr. Jackson. Unlikely systemic antibiotic therapy will provide benefit to Ms. Luna. Recommending increased wound care with Jentian violet and/or hydroferablue dressings at this time and then move on to topical antibiotic powders if needed. Recommended to transfer pt back to [facility] wound care so she can have close ID follow up with staff (Dr. Jackson) and wound care 3x a week. Discussed with wound care PA, Ms. Arteaga, who is currently following. Will discuss with Ms Jimenez on Monday during follow up.

## 2023-03-02 NOTE — TELEPHONE ENCOUNTER
I'm going to walk over to podiatry and let patient know that I did get the message that home health came in this morning and did her wound care so no need for her to come see wound care today. Keep her appointment next week.

## 2023-03-03 ENCOUNTER — DOCUMENT SCAN (OUTPATIENT)
Dept: HOME HEALTH SERVICES | Facility: HOSPITAL | Age: 88
End: 2023-03-03
Payer: MEDICARE

## 2023-03-03 NOTE — PROGRESS NOTES
Subjective:      Patient ID: Jenniffer Jimenez is a 89 y.o. female.    Chief Complaint: IDIOPATHIC PERIPHERAL NEUROPAATHY, Nail Care, and Foot Pain    Jenniffer is a 89 y.o. female who presents to the clinic for evaluation and treatment of high risk feet. Jenniffer has a past medical history of Amblyopia of left eye (04/10/2013), Arthritis, Atherosclerosis of aorta, Cataract, Central retinal vein occlusion of left eye, CKD (chronic kidney disease) stage 3, GFR 30-59 ml/min, Diabetes mellitus, type 2, Diabetic polyneuropathy (01/06/2022), Essential (primary) hypertension, Exotropia of both eyes (02/06/2013), Hearing loss, History of resection of small bowel, Hypertensive retinopathy of both eyes, Hypoglycemia, Macular degeneration, OA (osteoarthritis) of shoulder, Osteoporosis, Paroxysmal atrial fibrillation, Posterior vitreous detachment of both eyes, Psychiatric problem, Rhinitis, and TIA (transient ischemic attack). The patient's chief complaint is long, thick toenails. This patient has documented high risk feet requiring routine maintenance secondary to peripheral neuropathy.    PCP: Rubi Young, DO    Date Last Seen by PCP:   Chief Complaint   Patient presents with    IDIOPATHIC PERIPHERAL NEUROPAATHY    Nail Care    Foot Pain           Last encounter in this department: Visit date not found    Hemoglobin A1C   Date Value Ref Range Status   01/26/2023 6.0 (H) 4.0 - 5.6 % Final     Comment:     ADA Screening Guidelines:  5.7-6.4%  Consistent with prediabetes  >or=6.5%  Consistent with diabetes    High levels of fetal hemoglobin interfere with the HbA1C  assay. Heterozygous hemoglobin variants (HbS, HgC, etc)do  not significantly interfere with this assay.   However, presence of multiple variants may affect accuracy.     09/20/2022 6.0 (H) 4.0 - 5.6 % Final     Comment:     ADA Screening Guidelines:  5.7-6.4%  Consistent with prediabetes  >or=6.5%  Consistent with diabetes    High levels of fetal hemoglobin  Pt denies HI. Pt reports SI, no plan or intent. Pt reports hearing and seeing a \"marching band.\" Pt up at [facility] and pacing for the majority of the shift. Pt received trazodone at 2102, observed to be effective. Pt pleasant and cooperative with staff. Pt compliant with medications. Pts COWS were unremarkable. Patient agrees to notify staff if suicidal thoughts, plan or intent arise at any point through out the shift and to utilize positive coping skills to maintain safety on the unit. Staff will continue to monitor and follow the current treatment plan in place.       02/10/20 1830   Opiate Withdrawal Scale   Resting Pulse 0 - Less than or equal to 80   GI Upset 0   Sweating 1   Tremor 0   Restlessness 0   Yawning 0   Pupil Size 0   Anxiety or Irritability 0   Bone or Joint Aches 0   Gooseflesh Skin 0   Running Nose or Tearing 0   Opiate Withdrawal Total Score 1      interfere with the HbA1C  assay. Heterozygous hemoglobin variants (HbS, HgC, etc)do  not significantly interfere with this assay.   However, presence of multiple variants may affect accuracy.     09/30/2021 5.7 (H) 4.0 - 5.6 % Final     Comment:     ADA Screening Guidelines:  5.7-6.4%  Consistent with prediabetes  >or=6.5%  Consistent with diabetes    High levels of fetal hemoglobin interfere with the HbA1C  assay. Heterozygous hemoglobin variants (HbS, HgC, etc)do  not significantly interfere with this assay.   However, presence of multiple variants may affect accuracy.         Review of Systems   Constitutional: Negative for chills, decreased appetite and fever.   HENT:  Positive for hearing loss.    Cardiovascular:  Positive for leg swelling.   Skin:  Positive for color change, dry skin, nail changes and poor wound healing.   Musculoskeletal:  Positive for arthritis. Negative for joint pain, joint swelling and myalgias.   Gastrointestinal:  Negative for nausea and vomiting.   Neurological:  Positive for numbness. Negative for loss of balance and paresthesias.         Objective:      Physical Exam  Vitals reviewed.   Constitutional:       Appearance: She is well-developed.   Cardiovascular:      Comments: Dorsalis pedis and posterior tibial pulses are diminished Bilaterally. Toes are cool to touch. Feet are warm proximally.There is decreased digital hair. Skin is atrophic, slightly hyperpigmented, and mildly edematous      Musculoskeletal:         General: No tenderness. Normal range of motion.      Comments: Adequate joint range of motion without pain, limitation, nor crepitation Bilateral feet and ankle joints. Muscle strength is 5/5 in all groups bilaterally.         Skin:     General: Skin is warm and dry.      Findings: No ecchymosis, erythema or lesion.      Comments: Nails x10 are elongated by  2-5mm's, thickened by 2-5 mm's, dystrophic, and are darkened in  coloration . Xerosis Bilaterally. No open lesions  noted.    B/L lower extremity leg dressings noted . CDI   Neurological:      Mental Status: She is alert and oriented to person, place, and time.      Comments: [place] 5.07 monofilamant testing is diminished S[place]il feet. Sharp/dull sensation diminished Bilaterally. Light touch absent Bilaterally.       Psychiatric:         Behavior: Behavior normal.             Assessment:       Encounter Diagnoses   Name Primary?    Onychomycosis due to dermatophyte Yes    Idiopathic peripheral neuropathy     Debility          Plan:       Jenniffer was seen today for idiopathic peripheral neuropaathy, nail care and foot pain.    Diagnoses and all orders for this visit:    Onychomycosis due to dermatophyte    Idiopathic peripheral neuropathy    Debility      I counseled the patient on her conditions, their implications and medical management.        - Shoe inspection. Patient instructed on proper foot hygeine. We discussed wearing proper shoe gear, daily foot inspections, never walking without protective shoe gear, never putting sharp instruments to feet, routine podiatric nail visits every 2-3 months.      - With patient's permission, nails were aggressively reduced and debrided x 10 to their soft tissue attachment mechanically and with electric , removing all offending nail and debris. Patient relates relief following the procedure. She will continue to monitor the areas daily, inspect her feet, wear protective shoe gear when ambulatory, moisturizer to maintain skin integrity and follow in this office in approximately 2-3 months, sooner p.r.n.    - patient is currently under a comprehensive [facility] plan by [facility] for her bilateral lower extremity ulcerations with cellulitis of the right foot.  She has a new prescription for Augmentin which she will start today.  Since patient is already under treatment plan and had her legs dressed today by home health care I did not evaluate her bilateral lower  extremities.  She did have some discomfort to the ankle region secondary to rubbing against her sandal.  Patient was dispensed a Darco shoe for ambulation.  She assures that this felt more comfortable than her sandal and she ambulated out of clinic with the assistance of a walker without any difficulties.

## 2023-03-04 LAB
BACTERIA SPEC AEROBE CULT: ABNORMAL
BACTERIA SPEC AEROBE CULT: ABNORMAL

## 2023-03-05 ENCOUNTER — TELEPHONE (OUTPATIENT)
Dept: HOME HEALTH SERVICES | Facility: CLINIC | Age: 88
End: 2023-03-05
Payer: MEDICARE

## 2023-03-05 NOTE — TELEPHONE ENCOUNTER
"Late entry  2/15/2023    Patient on Palliative Care NP's schedule tomorrow. Called patient's  listed daughter Afshan Duenas who stated she does not have any contact with mother and she has "been put out of the house and I am staying with a friend." Daughter expressed her mother will not let her help her.    Called Ms. Jimenez to setup time for appt but when told her daughter was called, pt started yelling and screaming stating she did not want to talk to me and does not want me coming over to her house. Stated, "Do not call my daughter but call Paul Carl." However, pt. Hung up on me.    Notified Patient's PCP    Hilda Carlson, REGINA, FNP-C  [email or web address]  [phone]    "

## 2023-03-06 ENCOUNTER — CLINICAL SUPPORT (OUTPATIENT)
Dept: AUDIOLOGY | Facility: CLINIC | Age: 88
End: 2023-03-06
Payer: MEDICARE

## 2023-03-06 DIAGNOSIS — H90.A22 SENSORINEURAL HEARING LOSS (SNHL) OF LEFT EAR WITH RESTRICTED HEARING OF RIGHT EAR: Primary | ICD-10-CM

## 2023-03-06 DIAGNOSIS — H93.13 TINNITUS, SUBJECTIVE, BILATERAL: ICD-10-CM

## 2023-03-06 LAB — BACTERIA SPEC ANAEROBE CULT: NORMAL

## 2023-03-06 PROCEDURE — 92557 PR COMPREHENSIVE HEARING TEST: ICD-10-PCS | Mod: HCNC,S$GLB,,

## 2023-03-06 PROCEDURE — 92557 COMPREHENSIVE HEARING TEST: CPT | Mod: HCNC,S$GLB,,

## 2023-03-06 PROCEDURE — 92567 PR TYMPA2METRY: ICD-10-PCS | Mod: HCNC,S$GLB,,

## 2023-03-06 PROCEDURE — 92567 TYMPANOMETRY: CPT | Mod: HCNC,S$GLB,,

## 2023-03-06 NOTE — PROGRESS NOTES
Jenniffer Jimenez was seen today in the clinic for an audiologic evaluation.  Patient's main complaint was decreased hearing, bilaterally. Ms. Jimenez reported she was previously a patient of Lizzette Lopez CCC-A  and would like to re-establish care with her now that she is practicing at [facility]. She reported she has hearing aids, but has recently noticed significant difficulty communicating with them. She reported bilateral tinnitus, most noticeable when not wearing her hearing aids. Mrs. Jimenez denied otalgia and aural fullness. Dr. Bullock will see Ms. Jimenez following this audiogram as she had an opening this morning.    Tympanometry revealed Type C in the right ear and Type C in the left ear.     Audiogram results revealed a moderately-severe to profound sensorineural hearing loss (SNHL) in the right ear and a severe to profound SNHL in the left ear.      Speech reception thresholds were noted at 55 dBHL in the right ear and 80 dBHL in the left ear.    Speech discrimination scores were 64% in the right ear, 32% in the left ear, and 80% in both ears.    Recommendations:  Otologic evaluation  Hearing aid appointment with Dr. Bullock  Annual audiogram or sooner if change perceived  Hearing protection in noise

## 2023-03-06 NOTE — PROGRESS NOTES
Hearing Aid Consultation:    Ms. Jimenez was seen today for a hearing aid consultation. Ms. Jimenez currently wears an Audibel GLYNN hearing aid with an ear mold in the right ear, but says it has not been working well. Ms. Jimenez noted that she had a hearing aid for her left ear also, but lost the device. Ms. Jimenez reported significant difficulty understanding conversational speech. Binaural amplification was recommended due to Ms. Jimenez' binaural word recognition scores. Ms. Jimenez was counseled on realistic expectations with hearing aids. Different hearing aid technology levels were reviewed with the patient. Ms. Abad opted to move forward with purchasing binaural Phonak [employer] rechargeable GLYNN hearing aids with Cshells. Bilateral impressions were taken without incident. Ms. Abad was counseled on and expressed understanding of the 30 day trial period, $250 non-refundable deposit, and that we cannot file with insurance. Ms. Jimenez will return to the clinic in two weeks for her hearing aid fitting.

## 2023-03-07 ENCOUNTER — TELEPHONE (OUTPATIENT)
Dept: ALLERGY | Facility: CLINIC | Age: 88
End: 2023-03-07
Payer: MEDICARE

## 2023-03-07 ENCOUNTER — OFFICE VISIT (OUTPATIENT)
Dept: WOUND CARE | Facility: CLINIC | Age: 88
End: 2023-03-07
Payer: MEDICARE

## 2023-03-07 ENCOUNTER — OFFICE VISIT (OUTPATIENT)
Dept: INFECTIOUS DISEASES | Facility: CLINIC | Age: 88
End: 2023-03-07
Payer: MEDICARE

## 2023-03-07 VITALS
SYSTOLIC BLOOD PRESSURE: 134 MMHG | BODY MASS INDEX: 25.56 KG/M2 | DIASTOLIC BLOOD PRESSURE: 63 MMHG | HEART RATE: 84 BPM | HEIGHT: 65 IN | WEIGHT: 153.44 LBS | TEMPERATURE: 99 F

## 2023-03-07 DIAGNOSIS — N18.32 STAGE 3B CHRONIC KIDNEY DISEASE: ICD-10-CM

## 2023-03-07 DIAGNOSIS — S81.801A MULTIPLE OPEN WOUNDS OF RIGHT LOWER EXTREMITY, INITIAL ENCOUNTER: Primary | ICD-10-CM

## 2023-03-07 DIAGNOSIS — I50.32 CHRONIC DIASTOLIC HEART FAILURE: ICD-10-CM

## 2023-03-07 DIAGNOSIS — L03.90 CELLULITIS, UNSPECIFIED CELLULITIS SITE: Primary | ICD-10-CM

## 2023-03-07 DIAGNOSIS — Z86.73 HISTORY OF TIA (TRANSIENT ISCHEMIC ATTACK): ICD-10-CM

## 2023-03-07 DIAGNOSIS — I87.2 VENOUS INSUFFICIENCY OF BOTH LOWER EXTREMITIES: ICD-10-CM

## 2023-03-07 DIAGNOSIS — L03.115 CELLULITIS OF RIGHT LOWER EXTREMITY: ICD-10-CM

## 2023-03-07 DIAGNOSIS — S81.802A OPEN WOUND OF LEFT LOWER EXTREMITY, INITIAL ENCOUNTER: ICD-10-CM

## 2023-03-07 DIAGNOSIS — I10 PRIMARY HYPERTENSION: ICD-10-CM

## 2023-03-07 DIAGNOSIS — E13.42 DIABETIC POLYNEUROPATHY ASSOCIATED WITH OTHER SPECIFIED DIABETES MELLITUS: ICD-10-CM

## 2023-03-07 PROCEDURE — 99215 PR OFFICE/OUTPT VISIT, EST, LEVL V, 40-54 MIN: ICD-10-PCS | Mod: HCNC,S$GLB,,

## 2023-03-07 PROCEDURE — 1125F AMNT PAIN NOTED PAIN PRSNT: CPT | Mod: HCNC,CPTII,S$GLB,

## 2023-03-07 PROCEDURE — 99999 PR PBB SHADOW E&M-EST. PATIENT-LVL I: ICD-10-PCS | Mod: PBBFAC,HCNC,, | Performed by: REGISTERED NURSE

## 2023-03-07 PROCEDURE — 99999 PR PBB SHADOW E&M-EST. PATIENT-LVL V: ICD-10-PCS | Mod: PBBFAC,HCNC,,

## 2023-03-07 PROCEDURE — 1101F PT FALLS ASSESS-DOCD LE1/YR: CPT | Mod: HCNC,CPTII,S$GLB,

## 2023-03-07 PROCEDURE — 1101F PR PT FALLS ASSESS DOC 0-1 FALLS W/OUT INJ PAST YR: ICD-10-PCS | Mod: HCNC,CPTII,S$GLB,

## 2023-03-07 PROCEDURE — 3288F PR FALLS RISK ASSESSMENT DOCUMENTED: ICD-10-PCS | Mod: HCNC,CPTII,S$GLB,

## 2023-03-07 PROCEDURE — 99214 OFFICE O/P EST MOD 30 MIN: CPT | Mod: HCNC,S$GLB,, | Performed by: REGISTERED NURSE

## 2023-03-07 PROCEDURE — 1159F MED LIST DOCD IN RCRD: CPT | Mod: HCNC,CPTII,S$GLB,

## 2023-03-07 PROCEDURE — 99999 PR PBB SHADOW E&M-EST. PATIENT-LVL V: CPT | Mod: PBBFAC,HCNC,,

## 2023-03-07 PROCEDURE — 99215 OFFICE O/P EST HI 40 MIN: CPT | Mod: HCNC,S$GLB,,

## 2023-03-07 PROCEDURE — 99214 PR OFFICE/OUTPT VISIT, EST, LEVL IV, 30-39 MIN: ICD-10-PCS | Mod: HCNC,S$GLB,, | Performed by: REGISTERED NURSE

## 2023-03-07 PROCEDURE — 3288F FALL RISK ASSESSMENT DOCD: CPT | Mod: HCNC,CPTII,S$GLB,

## 2023-03-07 PROCEDURE — 1159F PR MEDICATION LIST DOCUMENTED IN MEDICAL RECORD: ICD-10-PCS | Mod: HCNC,CPTII,S$GLB,

## 2023-03-07 PROCEDURE — 99999 PR PBB SHADOW E&M-EST. PATIENT-LVL I: CPT | Mod: PBBFAC,HCNC,, | Performed by: REGISTERED NURSE

## 2023-03-07 PROCEDURE — 1125F PR PAIN SEVERITY QUANTIFIED, PAIN PRESENT: ICD-10-PCS | Mod: HCNC,CPTII,S$GLB,

## 2023-03-07 NOTE — TELEPHONE ENCOUNTER
----- Message from Jayna Jansen sent at 3/7/2023  4:38 PM CST -----  Regarding: Appt Access  Contact: Afshan/Daughter [phone]  Pts daughter is calling to see how she should get her mother set up for allergy injections.  Daughter is requesting a phone call.

## 2023-03-07 NOTE — PROGRESS NOTES
Subjective:       Patient ID: Jenniffer Jimenez is a 89 y.o. female.    Chief Complaint: Wound Check and Wound Infection    Patient presents with daughter for a reevaluation of a bilateral lower extremity wounds. Patient has home health. She reports that they are not wrapping her legs correctly. Patient states they wrap her leg from her ankle to her knee, and she states that they won't change her  dressing for PRN visits. Pt reports a new odor to her left lower extremity. Patient follows with ID at [facility]. ID recommends that she gets transferred to [facility] ID department. Cultures grew pseudomonas aeruginosa and e.coli ESBL. Denies fever, chills, erythema, warmth, or pain.    ABIs 11/9/22:  Right HOLLY:1.17  Left HOLLY: 1.14      Review of Systems   Constitutional:  Negative for activity change, chills, diaphoresis, fatigue and fever.   HENT:  Positive for hearing loss.    Respiratory:  Negative for apnea, chest tightness and shortness of breath.    Cardiovascular:  Positive for leg swelling. Negative for chest pain and palpitations.   Musculoskeletal:  Negative for gait problem and joint swelling.   Skin:  Positive for wound. Negative for color change, pallor and rash.   Neurological:  Negative for syncope, weakness and numbness.   Psychiatric/Behavioral:  Negative for agitation. The patient is not nervous/anxious.    All other systems reviewed and are negative.    Objective:      Physical Exam  Vitals reviewed.   Constitutional:       General: She is not in acute distress.     Appearance: Normal appearance.   Cardiovascular:      Rate and Rhythm: Normal rate and regular rhythm.      Pulses: Normal pulses.   Pulmonary:      Effort: No respiratory distress.   Musculoskeletal:         General: No swelling.      Right lower leg: Edema present.   Skin:     General: Skin is warm and dry.      Capillary Refill: Capillary refill takes less than 2 seconds.      Findings: Wound present. No erythema.          Neurological:       General: No focal deficit present.      Mental Status: She is alert and oriented to person, place, and time.   Psychiatric:         Mood and Affect: Mood normal.         Behavior: Behavior normal.         Thought Content: Thought content normal.         Judgment: Judgment normal.       Assessment:       1. Multiple open wounds of right lower extremity, initial encounter    2. Open wound of left lower extremity, initial encounter    3. Venous insufficiency of both lower extremities    4. History of TIA (transient ischemic attack)    5. Primary hypertension    6. Chronic diastolic heart failure    7. Diabetic polyneuropathy associated with other specified diabetes mellitus    8. Stage 3b chronic kidney disease    9. BMI 25.0-25.9,adult    10. Cellulitis of right lower extremity               Altered Skin Integrity Right anterior;lower;lateral;medial;posterior Leg (Active)       Altered Skin Integrity Present on Admission - Did Patient arrive to the hospital with altered skin?:    Side: Right   Orientation: anterior;lower;lateral;medial;posterior   Location: Leg   Wound Number:    Is this injury device related?:    Primary Wound Type:    Description of Altered Skin Integrity:    Ankle-Brachial Index:    Pulses:    Removal Indication and Assessment:    Wound Outcome:    (Retired) Wound Length (cm):    (Retired) Wound Width (cm):    (Retired) Depth (cm):    Wound Description (Comments):    Removal Indications:    Wound Image     03/07/23 1442   Dressing Appearance Dry;Intact;Clean;Moist drainage;Saturated 03/07/23 1442   Drainage Amount Copious 03/07/23 1442   Drainage Characteristics/Odor Yellow;Green;Malodorous 03/07/23 1442   Red (%), Wound Tissue Color 100 % 03/07/23 1442   Periwound Area Intact;Redness;Edematous;Warm 03/07/23 1442   Wound Length (cm) 24.5 cm 03/07/23 1442   Wound Width (cm) 25 cm 03/07/23 1442   Wound Depth (cm) 0.1 cm 03/07/23 1442   Wound Volume (cm^3) 61.25 cm^3 03/07/23 1442   Wound Surface  Area (cm^2) 612.5 cm^2 03/07/23 1442   Care Cleansed with:;Soap and water 03/07/23 1442   Dressing Applied;Calcium alginate;Silver;Absorptive Pad;Rolled gauze;Other (comment) 03/07/23 1442   Periwound Care Skin barrier film applied 03/07/23 1442            Altered Skin Integrity Left lower;lateral Leg (Active)       Altered Skin Integrity Present on Admission - Did Patient arrive to the hospital with altered skin?:    Side: Left   Orientation: lower;lateral   Location: Leg   Wound Number:    Is this injury device related?:    Primary Wound Type:    Description of Altered Skin Integrity:    Ankle-Brachial Index:    Pulses:    Removal Indication and Assessment:    Wound Outcome:    (Retired) Wound Length (cm):    (Retired) Wound Width (cm):    (Retired) Depth (cm):    Wound Description (Comments):    Removal Indications:    Wound Image   03/07/23 1442   Dressing Appearance Dry;Intact;Clean;Moist drainage;Saturated 03/07/23 1442   Drainage Amount Copious 03/07/23 1442   Drainage Characteristics/Odor Green;Yellow;Malodorous 03/07/23 1442   Red (%), Wound Tissue Color 100 % 03/07/23 1442   Periwound Area Intact;Edematous;Pink 03/07/23 1442   Wound Length (cm) 6.9 cm 03/07/23 1442   Wound Width (cm) 6.6 cm 03/07/23 1442   Wound Depth (cm) 0.1 cm 03/07/23 1442   Wound Volume (cm^3) 4.554 cm^3 03/07/23 1442   Wound Surface Area (cm^2) 45.54 cm^2 03/07/23 1442   Care Cleansed with:;Soap and water 03/07/23 1442   Dressing Applied;Calcium alginate;Silver;Absorptive Pad;Rolled gauze;Other (comment) 03/07/23 1442   Periwound Care Skin barrier film applied 03/07/23 1442        Jenniffer was seen in the clinic room and placed in the supine position on the treatment table.  The wound dressings were removed and the legs were cleansed with Easi-clense sponges and dried thoroughly. Odor and green drainage noted to bilateral lower extremities. No erythema or warmth appreciated.     Plan of Care: Calmoseptine to periwound, aquacel Ag to  wound bed, drawtex, mextra pad, kerlix, and 1 loose ACE wrap to hold bandages in place.     Plan:       Bilateral lower extremities dressed as detailed above.  Patient was instructed to not get the dressings wet and to use cast covers for showering.  Should the dressing become wet, she is to remove it, place a moist dressing over the wound, cover with gauze and roll gauze and use ace wraps for compression and to secure bandages.  She should then notify this office or home health as soon as possible to have a new dressing applied.    Discussed drainage control. Patient can come to clinic twice a week. Will see patient on Monday and Wednesday and home health will come out at the end of the week.    Discussed cellulitis with patient. Instructed patient to follow up with ID.  Referral placed for [facility] wound care to coordinate visits with ID. Appointment scheduled for 3/13/23 at 2 pm.    Faxed home health care orders:  HOME HEALTH WOUND CARE ORDERS  D/C previous orders  Wound care and nurse visits: 2X a week and PRN complications  1. Cleanse wounds with saline, mild soap and water, or wound cleanser  2.Apply: Calmoseptine to periwound, aquacel Ag to wound beds, drawtex, mextra pads, kerlix, and 1 loose ACE wrap to hold bandages in place from below her first metatarsal and ending below the tibial tubercle of the knee and covering her ankle  3.Cover with appropriate cover dressing and contact the office for any substitutions in dressings  Monitor for infection and teach patient/caregiver signs and symptoms    Discussed nutrition and the role of protein in wound healing with the patient. Instructed patient to continue to optimize protein for wound healing within CKD protein limitations.     Discussed offloading wounds with patient. Instructed patient to place pillows, rolled towels, or rolled linens under bilateral knees and ankles to relieve pressure off of wounds.     Instructed patient to elevate legs whenever  sedentary, to follow a low salt diet, and take lasix as prescribed.      All questions were answered.  Written and verbal instructions given to patient  RTC PRBASHIR Arteaga PA-C                        50  minutes of total time spent on the encounter, which includes face to face time and non-face to face time preparing to see the patient (eg, review of tests), Obtaining and/or reviewing separately obtained history, Documenting clinical information in the electronic or other health record, Independently interpreting results (not separately reported) and communicating results to the patient/family/caregiver, or Care coordination (not separately reported).

## 2023-03-08 ENCOUNTER — DOCUMENT SCAN (OUTPATIENT)
Dept: HOME HEALTH SERVICES | Facility: HOSPITAL | Age: 88
End: 2023-03-08
Payer: MEDICARE

## 2023-03-08 NOTE — PROGRESS NOTES
Subjective:       Patient ID: Jenniffer Jimenez is a 89 y.o. female.    Chief Complaint: No chief complaint on file.    HPI    Ms. Jimenez is 89 year old female with a PMH of HLD, HTN, AF, CKD, HFpEF, venous insufficiency who presents to clinic today as a follow up. Pt is seen with wound care. Pt is being treated with bilateral lower leg venous ulcer suspicious of cellulitis. During last visit, wounds were with green drainage that were malodorous.     Wound culture obtained per wound care, + pseudomonas and ecoli. Pseudomonas sensitive to FQ but patient had drug rash with cipro in December, otherwise only with IV options. Pt was treated with 7 days of Augmentin prior to wound culture results.      Today pt states HH wound care is not wrapping wounds properly. States foul odor remains. C/o pain. C/p increased drainage. Denies fever, body aches, chills. Denies SS of infection. Pt hard of hearing.     3/7/23                    Review of Systems   Constitutional:  Negative for chills, diaphoresis, fatigue and fever.   HENT: Negative.     Eyes: Negative.    Respiratory:  Negative for cough and shortness of breath.    Cardiovascular:  Negative for chest pain, palpitations and leg swelling.   Gastrointestinal:  Negative for abdominal distention, constipation, diarrhea, nausea and vomiting.   Musculoskeletal:  Positive for leg pain.   Neurological:  Positive for weakness. Negative for dizziness and numbness.   Psychiatric/Behavioral:  Negative for agitation and confusion.        Objective:      Physical Exam  Vitals reviewed.   Constitutional:       General: She is not in acute distress.     Appearance: Normal appearance. She is well-developed and normal weight. She is not ill-appearing, toxic-appearing or diaphoretic.   HENT:      Head: Normocephalic and atraumatic.      Nose: Nose normal.      Mouth/Throat:      Dentition: Normal dentition. Does not have dentures. No dental caries or dental abscesses.   Eyes:       General: No scleral icterus.     Conjunctiva/sclera: Conjunctivae normal.   Cardiovascular:      Rate and Rhythm: Normal rate and regular rhythm.      Heart sounds: Normal heart sounds. No murmur heard.  Pulmonary:      Effort: Pulmonary effort is normal. No respiratory distress.      Breath sounds: Normal breath sounds. No wheezing or rales.   Abdominal:      General: Bowel sounds are normal. There is no distension.      Palpations: Abdomen is soft.      Tenderness: There is no abdominal tenderness.   Musculoskeletal:         General: Tenderness present. Normal range of motion.      Cervical back: Normal range of motion.      Right lower leg: No edema.      Left lower leg: No edema.   Skin:     General: Skin is warm and dry.      Findings: Erythema, lesion and rash present.   Neurological:      General: No focal deficit present.      Mental Status: She is alert and oriented to person, place, and time. Mental status is at baseline.      Motor: Weakness present.      Gait: Gait abnormal.   Psychiatric:         Mood and Affect: Mood normal.         Behavior: Behavior normal.         Thought Content: Thought content normal.         Judgment: Judgment normal.       Assessment:         Visit Diagnoses       Cellulitis, unspecified cellulitis site    -  Primary            Plan:       Recommendations:   Wounds appear to be improving. Do not appear acutely infected today. No foul smelling odor appreciated.   - Recommended pt to elevate legs as much as possible  - Recommended pt to follow up with wound care at [facility] and Dr. Jackson with ID at [facility].   - Case previously discussed with Dr. Jackson. Recommending aggressive wound care and potentially antibiotic powder for localized treatment.   - discussed with pt to return to clinic or seek immediate medical attention for any fevers, chills, sweats, diarrhea, n/v or increase in pain, swelling, drainage from wound.         30 minutes of total time was spent  on this encounter, which includes face to face time and non-face to face time preparing to see the patient (eg, review of tests), Obtaining and/or reviewing separately obtained history, documenting clinical information in the electronic or other health record, independently interpreting results (not separately reported) and communicating results to the patient/family/caregiver, or care coordination (not separately reported).

## 2023-03-09 DIAGNOSIS — R53.81 DEBILITY: Primary | ICD-10-CM

## 2023-03-13 ENCOUNTER — HOSPITAL ENCOUNTER (OUTPATIENT)
Dept: WOUND CARE | Facility: HOSPITAL | Age: 88
Discharge: HOME | End: 2023-03-13
Attending: PREVENTIVE MEDICINE
Payer: MEDICARE

## 2023-03-13 ENCOUNTER — EXTERNAL HOME HEALTH (OUTPATIENT)
Dept: HOME HEALTH SERVICES | Facility: HOSPITAL | Age: 88
End: 2023-03-13
Payer: MEDICARE

## 2023-03-13 ENCOUNTER — CLINICAL SUPPORT (OUTPATIENT)
Dept: INFECTIOUS DISEASES | Facility: CLINIC | Age: 88
End: 2023-03-13
Payer: MEDICARE

## 2023-03-13 VITALS
HEIGHT: 65 IN | WEIGHT: 153 LBS | SYSTOLIC BLOOD PRESSURE: 127 MMHG | HEART RATE: 78 BPM | TEMPERATURE: 98 F | DIASTOLIC BLOOD PRESSURE: 56 MMHG | BODY MASS INDEX: 25.49 KG/M2

## 2023-03-13 DIAGNOSIS — S81.801A MULTIPLE OPEN WOUNDS OF RIGHT LOWER EXTREMITY, INITIAL ENCOUNTER: Primary | ICD-10-CM

## 2023-03-13 DIAGNOSIS — L03.115 CELLULITIS OF RIGHT LOWER EXTREMITY: ICD-10-CM

## 2023-03-13 DIAGNOSIS — L30.4 INTERTRIGO: ICD-10-CM

## 2023-03-13 DIAGNOSIS — N18.32 STAGE 3B CHRONIC KIDNEY DISEASE: ICD-10-CM

## 2023-03-13 DIAGNOSIS — I87.2 VENOUS INSUFFICIENCY OF BOTH LOWER EXTREMITIES: ICD-10-CM

## 2023-03-13 DIAGNOSIS — I50.32 CHRONIC DIASTOLIC HEART FAILURE: Chronic | ICD-10-CM

## 2023-03-13 DIAGNOSIS — S81.802A OPEN WOUND OF LEFT LOWER EXTREMITY, INITIAL ENCOUNTER: ICD-10-CM

## 2023-03-13 DIAGNOSIS — I87.2 VENOUS INSUFFICIENCY OF BOTH LOWER EXTREMITIES: Primary | ICD-10-CM

## 2023-03-13 PROCEDURE — 99214 OFFICE O/P EST MOD 30 MIN: CPT | Mod: HCNC,S$GLB,, | Performed by: INTERNAL MEDICINE

## 2023-03-13 PROCEDURE — 99204 OFFICE O/P NEW MOD 45 MIN: CPT | Mod: HCNC

## 2023-03-13 PROCEDURE — 99214 PR OFFICE/OUTPT VISIT, EST, LEVL IV, 30-39 MIN: ICD-10-PCS | Mod: HCNC,S$GLB,, | Performed by: INTERNAL MEDICINE

## 2023-03-13 RX ORDER — TRIAMCINOLONE ACETONIDE 5 MG/G
CREAM TOPICAL
Qty: 454 G | Refills: 0 | Status: SHIPPED | OUTPATIENT
Start: 2023-03-13 | End: 2023-04-21

## 2023-03-13 NOTE — PROGRESS NOTES
"                     [facility]    Subjective:       Patient ID: Jenniffer Jimenez is a 89 y.o. female.    Chief Complaint: Wound Consult    Consult related to bilateral leg wounds. Patient states the wounds started in July, mentioned concern that the wound started "a few months after I couldn't get my allergy shot" which she has been receiving since 1982, patient unable to name the medication in question.  They have been being seen by home health every other day, who have been cleaning the wound with saline, applying mextra, kerlix, coflex with calamine to both legs.  To bilateral feet +2 dorsalis pedis, +2 posterior tibial, <3s capillary refill, no tingling, history of numbness.  Left HOLLY 0.94, right HOLLY 1.07.  Plan of care established.    Review of Systems   All other systems reviewed and are negative.      Objective:     Vitals:    03/13/23 1357   BP: (!) 127/56   Pulse: 78   Temp: 98.1 °F (36.7 °C)         Physical Exam       Altered Skin Integrity 01/25/23 2130 Left lower;dorsal;posterior Arm Other (comment) (Active)   01/25/23 2130   Altered Skin Integrity Present on Admission - Did Patient arrive to the hospital with altered skin?: yes   Side: Left   Orientation: lower;dorsal;posterior   Location: Arm   Wound Number:    Is this injury device related?: No   Primary Wound Type: Other   Description of Altered Skin Integrity:    Ankle-Brachial Index:    Pulses:    Removal Indication and Assessment:    Wound Outcome:    (Retired) Wound Length (cm):    (Retired) Wound Width (cm):    (Retired) Depth (cm):    Wound Description (Comments):    Removal Indications:    Wound Image   03/13/23 1400   Dressing Appearance Open to air 03/13/23 1400   Drainage Amount None 03/13/23 1400   Appearance Closed/resurfaced 03/13/23 1400   Tissue loss description Not applicable 03/13/23 1400   Periwound Area Redness;Dry 03/13/23 1400   Wound Edges Rolled/closed 03/13/23 1400   Wound Length (cm) 0 cm " 03/13/23 1400   Wound Width (cm) 0 cm 03/13/23 1400   Wound Depth (cm) 0 cm 03/13/23 1400   Wound Volume (cm^3) 0 cm^3 03/13/23 1400   Wound Surface Area (cm^2) 0 cm^2 03/13/23 1400   Care Cleansed with:;Sterile normal saline 03/13/23 1400            Altered Skin Integrity Right anterior;lower;lateral;medial;posterior Leg (Active)       Altered Skin Integrity Present on Admission - Did Patient arrive to the hospital with altered skin?:    Side: Right   Orientation: anterior;lower;lateral;medial;posterior   Location: Leg   Wound Number:    Is this injury device related?:    Primary Wound Type:    Description of Altered Skin Integrity:    Ankle-Brachial Index:    Pulses:    Removal Indication and Assessment:    Wound Outcome:    (Retired) Wound Length (cm):    (Retired) Wound Width (cm):    (Retired) Depth (cm):    Wound Description (Comments):    Removal Indications:    Wound Image      03/13/23 1400   Description of Altered Skin Integrity Partial thickness tissue loss. Shallow open ulcer with a red or pink wound bed, without slough. Intact or Open/Ruptured Serum-filled blister. 03/13/23 1400   Dressing Appearance Intact;Moist drainage;Saturated 03/13/23 1400   Drainage Amount Large 03/13/23 1400   Drainage Characteristics/Odor Green;Serosanguineous 03/13/23 1400   Appearance Red;Moist 03/13/23 1400   Tissue loss description Partial thickness 03/13/23 1400   Red (%), Wound Tissue Color 100 % 03/13/23 1400   Periwound Area Blistered;Dry 03/13/23 1400   Wound Edges Irregular 03/13/23 1400   Wound Length (cm) 20 cm 03/13/23 1400   Wound Width (cm) 25 cm 03/13/23 1400   Wound Depth (cm) 0.1 cm 03/13/23 1400   Wound Volume (cm^3) 50 cm^3 03/13/23 1400   Wound Surface Area (cm^2) 500 cm^2 03/13/23 1400   Care Cleansed with:;Soap and water;Sterile normal saline;Debrided 03/13/23 1400   Dressing Applied;Gauze, wet to moist;Absorptive Pad;Tubular bandage 03/13/23 1400   Periwound Care Topical treatment applied 03/13/23 1400    Compression Tubular elasticized bandage 03/13/23 1400   Dressing Change Due 03/15/23 03/13/23 1400            Altered Skin Integrity Left lower;lateral Leg (Active)       Altered Skin Integrity Present on Admission - Did Patient arrive to the hospital with altered skin?:    Side: Left   Orientation: lower;lateral   Location: Leg   Wound Number:    Is this injury device related?:    Primary Wound Type:    Description of Altered Skin Integrity:    Ankle-Brachial Index:    Pulses:    Removal Indication and Assessment:    Wound Outcome:    (Retired) Wound Length (cm):    (Retired) Wound Width (cm):    (Retired) Depth (cm):    Wound Description (Comments):    Removal Indications:    Wound Image      03/13/23 1400   Description of Altered Skin Integrity Partial thickness tissue loss. Shallow open ulcer with a red or pink wound bed, without slough. Intact or Open/Ruptured Serum-filled blister. 03/13/23 1400   Dressing Appearance Moist drainage;Intact;Saturated 03/13/23 1400   Drainage Amount Large 03/13/23 1400   Drainage Characteristics/Odor Green;Serosanguineous 03/13/23 1400   Appearance Red;Moist 03/13/23 1400   Tissue loss description Partial thickness 03/13/23 1400   Red (%), Wound Tissue Color 100 % 03/13/23 1400   Periwound Area Blistered;Satellite lesion;Dry 03/13/23 1400   Wound Edges Irregular 03/13/23 1400   Wound Length (cm) 9.5 cm 03/13/23 1400   Wound Width (cm) 8 cm 03/13/23 1400   Wound Depth (cm) 0.1 cm 03/13/23 1400   Wound Volume (cm^3) 7.6 cm^3 03/13/23 1400   Wound Surface Area (cm^2) 76 cm^2 03/13/23 1400   Care Cleansed with:;Soap and water;Sterile normal saline 03/13/23 1400   Dressing Applied;Tubular bandage;Absorptive Pad 03/13/23 1400   Periwound Care Topical treatment applied 03/13/23 1400   Compression Tubular elasticized bandage 03/13/23 1400   Dressing Change Due 03/15/23 03/13/23 1400           Assessment/Plan:          ICD-10-CM ICD-9-CM   1. Multiple open wounds of right lower  extremity, initial encounter  S81.801A 894.0   2. Open wound of left lower extremity, initial encounter  S81.802A 891.0   3. Venous insufficiency of both lower extremities  I87.2 459.81           Tissue pathology and/or culture taken:  [] Yes [x] No   Sharp debridement performed:   [] Yes [x] No   Labs ordered this visit:   [] Yes [x] No   Imaging ordered this visit:   [] Yes [x] No           Orders Placed This Encounter   Procedures    Ambulatory referral/consult to Wound Clinic     Standing Status:   Standing     Number of Occurrences:   1     Referral Priority:   Routine     Referral Type:   Consultation     Referral Reason:   Specialty Services Required     Requested Specialty:   Wound Care     Number of Visits Requested:   1    Change dressing     Right lower leg and left lower leg wounds  Cleanse wound with: Soap and water or, Normal Saline  Lidocaine: PRN  Silver nitrate: PRN  Periwound care: betamethasone to dry scaly areas  Primary dressing: vashe gauze to right lower leg wound, then apply 3 mextras to the right leg and two mextras to the left  Secondary dressing: kerlix, then tubigrip E to bilateral legs  Edema control: tubigrip E to bilateral legs toe to knees, elevate legs when sitting  Frequency: three times weekly and as needed  Follow-up: in 1 week with Dr Cox  [facility]: Please continue wound care as above three times weekly and as needed except when the patient is in clinic.  Please arrange for bathing assistance three times weekly before dressings are changed, ok to take off leg bandages and wash legs in the shower with soap and water.  Thank you.        Follow up in about 1 week (around 3/20/2023) for Dr Cox.

## 2023-03-13 NOTE — PROGRESS NOTES
Patient presents for wound care and possible infection. Patient saw ID on 3/7. She has had mild compression stockings - her daughter complaining that they hurt when compressed too tightly. No fevers or chills. Daughter states that her brother came to town and she was allowed to dangle her feet as well as ignore her low sodium diet.     Exam - exposed skin, but no periwound erythema. Lots of dry skin flakes. No exudate or purulence. See pictures.  1. Venous insufficiency of both lower extremities    2. Chronic diastolic heart failure    3. Stage 3b chronic kidney disease    4. Intertrigo    5. Cellulitis of right lower extremity        Discussed at bedside with Dr. Cox, daughter, and patient. Multiple questions about diet, fluid restriction, edema control, wound care, and allergy shots. No indication for antibiotics at this time. Continue wound care. I spent over 30 minutes on this encounter today.

## 2023-03-14 ENCOUNTER — DOCUMENT SCAN (OUTPATIENT)
Dept: HOME HEALTH SERVICES | Facility: HOSPITAL | Age: 88
End: 2023-03-14
Payer: MEDICARE

## 2023-03-14 ENCOUNTER — TELEPHONE (OUTPATIENT)
Dept: WOUND CARE | Facility: HOSPITAL | Age: 88
End: 2023-03-14
Payer: MEDICARE

## 2023-03-14 NOTE — TELEPHONE ENCOUNTER
Received message that Toya had called a second time and was requesting a callback, attempted to call, left my number, waiting for callback.  ---  Spoke with Toya from [facility], she stated the bathing assistance is normally only covered by insurance twice weekly for a few weeks and after that only once weekly and that they would have to decrease to once a week next week.  In addition, the patient's dressings are already saturated, patient's family called for a PRN dressing change, checked with charge nurse, ok to add additional abds and teach the daughter.    ----- Message from Sagrario Schmitz RN sent at 3/14/2023 11:09 AM CDT -----  Toya with [facility] is requesting a call back to discuss orders that were sent yesterday. Call back # [phone]

## 2023-03-17 ENCOUNTER — PES CALL (OUTPATIENT)
Dept: ADMINISTRATIVE | Facility: CLINIC | Age: 88
End: 2023-03-17
Payer: MEDICARE

## 2023-03-19 ENCOUNTER — DOCUMENT SCAN (OUTPATIENT)
Dept: HOME HEALTH SERVICES | Facility: HOSPITAL | Age: 88
End: 2023-03-19
Payer: MEDICARE

## 2023-03-20 ENCOUNTER — HOSPITAL ENCOUNTER (OUTPATIENT)
Dept: WOUND CARE | Facility: HOSPITAL | Age: 88
Discharge: HOME | End: 2023-03-20
Attending: PREVENTIVE MEDICINE
Payer: MEDICARE

## 2023-03-20 DIAGNOSIS — E87.5 HYPERKALEMIA: ICD-10-CM

## 2023-03-20 DIAGNOSIS — I87.2 VENOUS STASIS DERMATITIS OF BOTH LOWER EXTREMITIES: Primary | ICD-10-CM

## 2023-03-20 DIAGNOSIS — S81.801D MULTIPLE OPEN WOUNDS OF RIGHT LOWER EXTREMITY, SUBSEQUENT ENCOUNTER: ICD-10-CM

## 2023-03-20 DIAGNOSIS — S81.802D OPEN WOUND OF LEFT LOWER EXTREMITY, SUBSEQUENT ENCOUNTER: ICD-10-CM

## 2023-03-20 PROCEDURE — 99213 OFFICE O/P EST LOW 20 MIN: CPT | Mod: HCNC

## 2023-03-20 RX ORDER — METHYLPREDNISOLONE 4 MG/1
TABLET ORAL
Qty: 21 EACH | Refills: 0 | Status: SHIPPED | OUTPATIENT
Start: 2023-03-20 | End: 2023-03-30 | Stop reason: ALTCHOICE

## 2023-03-20 RX ORDER — DOXYCYCLINE HYCLATE 100 MG
100 TABLET ORAL EVERY 12 HOURS
Qty: 20 TABLET | Refills: 0 | Status: SHIPPED | OUTPATIENT
Start: 2023-03-20 | End: 2023-03-30 | Stop reason: ALTCHOICE

## 2023-03-20 NOTE — PROGRESS NOTES
[facility]    Subjective:       Patient ID: Jenniffer Jimenez is a 89 y.o. female.    Chief Complaint: Wound Care    Follow up for BLE wounds. No new concerns, patient tolerated dressing changes well.  New orders noted. Rx for oral steroids and Doxycycline sent for patient.  Review of Systems   All other systems reviewed and are negative.      Objective:         Physical Exam       Altered Skin Integrity Right anterior;lower;lateral;medial;posterior Leg (Active)       Altered Skin Integrity Present on Admission - Did Patient arrive to the hospital with altered skin?:    Side: Right   Orientation: anterior;lower;lateral;medial;posterior   Location: Leg   Wound Number:    Is this injury device related?:    Primary Wound Type:    Description of Altered Skin Integrity:    Ankle-Brachial Index:    Pulses:    Removal Indication and Assessment:    Wound Outcome:    (Retired) Wound Length (cm):    (Retired) Wound Width (cm):    (Retired) Depth (cm):    Wound Description (Comments):    Removal Indications:    Wound Image   03/20/23 1544   Dressing Appearance Intact;Moist drainage 03/20/23 1544   Drainage Amount Large 03/20/23 1544   Drainage Characteristics/Odor Serosanguineous 03/20/23 1544   Appearance Intact;Red 03/20/23 1544   Tissue loss description Partial thickness 03/20/23 1544   Red (%), Wound Tissue Color 100 % 03/20/23 1544   Periwound Area Intact;Dry 03/20/23 1544   Wound Edges Irregular 03/20/23 1544   Wound Length (cm) 20 cm 03/20/23 1544   Wound Width (cm) 25 cm 03/20/23 1544   Wound Depth (cm) 0.1 cm 03/20/23 1544   Wound Volume (cm^3) 50 cm^3 03/20/23 1544   Wound Surface Area (cm^2) 500 cm^2 03/20/23 1544   Care Cleansed with:;Soap and water;Sterile normal saline 03/20/23 1544   Dressing Applied;Changed;Non-adherent;Absorptive Pad;Rolled gauze;Tubular bandage 03/20/23 1544   Periwound Care Topical treatment applied 03/20/23 1544   Compression Tubular  elasticized bandage 03/20/23 1544   Dressing Change Due 03/27/23 03/20/23 1544            Altered Skin Integrity Left lower;lateral Leg (Active)       Altered Skin Integrity Present on Admission - Did Patient arrive to the hospital with altered skin?:    Side: Left   Orientation: lower;lateral   Location: Leg   Wound Number:    Is this injury device related?:    Primary Wound Type:    Description of Altered Skin Integrity:    Ankle-Brachial Index:    Pulses:    Removal Indication and Assessment:    Wound Outcome:    (Retired) Wound Length (cm):    (Retired) Wound Width (cm):    (Retired) Depth (cm):    Wound Description (Comments):    Removal Indications:    Wound Image   03/20/23 1544   Dressing Appearance Intact;Moist drainage 03/20/23 1544   Drainage Amount Large 03/20/23 1544   Drainage Characteristics/Odor Serosanguineous 03/20/23 1544   Appearance Intact;Red;Moist 03/20/23 1544   Tissue loss description Partial thickness 03/20/23 1544   Red (%), Wound Tissue Color 100 % 03/20/23 1544   Periwound Area Blistered;Satellite lesion 03/20/23 1544   Wound Edges Irregular 03/20/23 1544   Wound Length (cm) 9.5 cm 03/20/23 1544   Wound Width (cm) 8 cm 03/20/23 1544   Wound Depth (cm) 0.1 cm 03/20/23 1544   Wound Volume (cm^3) 7.6 cm^3 03/20/23 1544   Wound Surface Area (cm^2) 76 cm^2 03/20/23 1544   Care Cleansed with:;Soap and water;Sterile normal saline 03/20/23 1544   Dressing Applied;Changed;Non-adherent;Absorptive Pad;Rolled gauze;Tubular bandage 03/20/23 1544   Periwound Care Topical treatment applied 03/20/23 1544   Compression Tubular elasticized bandage 03/20/23 1544   Dressing Change Due 03/27/23 03/20/23 1544         Assessment/Plan:         ICD-10-CM ICD-9-CM   1. Venous stasis dermatitis of both lower extremities  I87.2 454.1   2. Multiple open wounds of right lower extremity, subsequent encounter  S81.801D V58.89     894.0   3. Open wound of left lower extremity, subsequent encounter  S81.802D V58.89      891.0   4. Hyperkalemia  E87.5 276.7           Tissue pathology and/or culture taken:  [] Yes [x] No   Sharp debridement performed:   [] Yes [x] No   Labs ordered this visit:   [] Yes [x] No   Imaging ordered this visit:   [] Yes [x] No           Orders Placed This Encounter   Procedures    Change dressing     Right lower leg and left lower leg wounds   Cleanse wound with: Efrem's Soap and water   Lidocaine: PRN   Silver nitrate: PRN   Periwound care: betamethasone to dry scaly areas   Primary dressing: Xeroform to right lower leg wound, then apply 3 ABD's to the right leg and 2 ABD's to the left   Secondary dressing: cast padding, then tubigrip E to bilateral legs   Edema control: tubigrip E to bilateral legs toe to knees, elevate legs when sitting   Frequency: three times weekly and as needed   Follow-up: in 1 week with Dr Cox   [facility]: Please continue wound care as above three times weekly and as needed except when the patient is in clinic.  Please arrange for bathing assistance three times weekly before dressings are changed, ok to take off leg bandages and wash legs in the shower with soap and water.      Other Orders: Rx for oral steroids and Doxycycline sent for patient.        Follow up in about 1 week (around 3/27/2023).

## 2023-03-23 ENCOUNTER — CLINICAL SUPPORT (OUTPATIENT)
Dept: AUDIOLOGY | Facility: CLINIC | Age: 88
End: 2023-03-23
Payer: MEDICARE

## 2023-03-23 ENCOUNTER — OFFICE VISIT (OUTPATIENT)
Dept: OTOLARYNGOLOGY | Facility: CLINIC | Age: 88
End: 2023-03-23
Payer: MEDICARE

## 2023-03-23 VITALS — SYSTOLIC BLOOD PRESSURE: 116 MMHG | HEART RATE: 81 BPM | DIASTOLIC BLOOD PRESSURE: 69 MMHG

## 2023-03-23 DIAGNOSIS — Z88.9 HISTORY OF MULTIPLE ALLERGIES: ICD-10-CM

## 2023-03-23 DIAGNOSIS — Z86.69 HISTORY OF MENIERE'S DISEASE: ICD-10-CM

## 2023-03-23 DIAGNOSIS — Z98.890 HISTORY OF EAR SURGERY: ICD-10-CM

## 2023-03-23 DIAGNOSIS — Z97.4 WEARS HEARING AID IN RIGHT EAR: Primary | ICD-10-CM

## 2023-03-23 DIAGNOSIS — H91.8X3 ASYMMETRICAL HEARING LOSS: ICD-10-CM

## 2023-03-23 DIAGNOSIS — H90.A22 SENSORINEURAL HEARING LOSS (SNHL) OF LEFT EAR WITH RESTRICTED HEARING OF RIGHT EAR: Primary | ICD-10-CM

## 2023-03-23 DIAGNOSIS — H61.23 IMPACTED CERUMEN, BILATERAL: ICD-10-CM

## 2023-03-23 PROCEDURE — 1126F AMNT PAIN NOTED NONE PRSNT: CPT | Mod: HCNC,CPTII,S$GLB, | Performed by: OTOLARYNGOLOGY

## 2023-03-23 PROCEDURE — 1101F PR PT FALLS ASSESS DOC 0-1 FALLS W/OUT INJ PAST YR: ICD-10-PCS | Mod: HCNC,CPTII,S$GLB, | Performed by: OTOLARYNGOLOGY

## 2023-03-23 PROCEDURE — 1159F PR MEDICATION LIST DOCUMENTED IN MEDICAL RECORD: ICD-10-PCS | Mod: HCNC,CPTII,S$GLB, | Performed by: OTOLARYNGOLOGY

## 2023-03-23 PROCEDURE — 1101F PT FALLS ASSESS-DOCD LE1/YR: CPT | Mod: HCNC,CPTII,S$GLB, | Performed by: OTOLARYNGOLOGY

## 2023-03-23 PROCEDURE — 1126F PR PAIN SEVERITY QUANTIFIED, NO PAIN PRESENT: ICD-10-PCS | Mod: HCNC,CPTII,S$GLB, | Performed by: OTOLARYNGOLOGY

## 2023-03-23 PROCEDURE — 99999 PR PBB SHADOW E&M-EST. PATIENT-LVL III: CPT | Mod: PBBFAC,HCNC,, | Performed by: OTOLARYNGOLOGY

## 2023-03-23 PROCEDURE — 69210 REMOVE IMPACTED EAR WAX UNI: CPT | Mod: HCNC,S$GLB,, | Performed by: OTOLARYNGOLOGY

## 2023-03-23 PROCEDURE — 1159F MED LIST DOCD IN RCRD: CPT | Mod: HCNC,CPTII,S$GLB, | Performed by: OTOLARYNGOLOGY

## 2023-03-23 PROCEDURE — 99999 PR PBB SHADOW E&M-EST. PATIENT-LVL III: ICD-10-PCS | Mod: PBBFAC,HCNC,, | Performed by: OTOLARYNGOLOGY

## 2023-03-23 PROCEDURE — 69210 PR REMOVAL IMPACTED CERUMEN REQUIRING INSTRUMENTATION, UNILATERAL: ICD-10-PCS | Mod: HCNC,S$GLB,, | Performed by: OTOLARYNGOLOGY

## 2023-03-23 PROCEDURE — 3288F FALL RISK ASSESSMENT DOCD: CPT | Mod: HCNC,CPTII,S$GLB, | Performed by: OTOLARYNGOLOGY

## 2023-03-23 PROCEDURE — 99499 NO LOS: ICD-10-PCS | Mod: HCNC,S$GLB,, | Performed by: OTOLARYNGOLOGY

## 2023-03-23 PROCEDURE — 3288F PR FALLS RISK ASSESSMENT DOCUMENTED: ICD-10-PCS | Mod: HCNC,CPTII,S$GLB, | Performed by: OTOLARYNGOLOGY

## 2023-03-23 PROCEDURE — 99499 UNLISTED E&M SERVICE: CPT | Mod: HCNC,S$GLB,, | Performed by: OTOLARYNGOLOGY

## 2023-03-23 NOTE — PROGRESS NOTES
Hearing Aid Evaluation:    Ms. Abad was seen today for a hearing aid evaluation to be fit with Phonal Lumity Life 30 GLYNN Rechargeable hearing aids with C-shells. Ms. Abad is a previous hearing aid user. Ms. Abad is being fit with binaural hearing aids due to her increase in speech discrimination ability in the binaural condition compared to the unilateral condition. Ms. Abad's hearing aids were programmed to 90% target gain. She reported comfortable and clear sound quality. Ms. Abad was counseled on realistic expectations with her degree of hearing loss and the importance of using good communication strategies. Ms. Abad's hearing aids were connected to her cell phone for streaming. Ms. Abad was demonstrated understanding of proper insertion and removal, care and maintenance, and recharging. Ms. Abad expressed understanding of the 3 year warranty, 30 day trial period, non-refundable $250 deposit, and that we cannot file with insurance for hearing aids. Ms. Abad will return in 2 weeks with the hearing. We will review cleaning at this appointment. It was recommended she bring her daughter for a second set of eyes.     Hearing Aid Information:  Phonak L30-RL   Sand Beige   LT SN: [id number]   RT SN: [id number]   Warranty Exp:  06/11/2026     cShell 4.0 Acryl   Lt SN:  [id number]    ACC: [id number]   RT SN: [id number]   ACC: [id number]   Tube Length: 1   : Power   Warranty Exp  06/13/2023

## 2023-03-23 NOTE — PATIENT INSTRUCTIONS
Dry cerumen impactions removed from both semi-occluded eacs  Pt. interested in  re- starting her allergy injections ( Dr. Ragsdale) ; may consult with Dr. AMADO Oliver [phone]; allergy rx (vs Dr. Burak Jeffery who treats ENT related allergy in office)   Pt. may follow up with Dr. MONICO Lehman re: [facility]'s management prn; card provided

## 2023-03-23 NOTE — PROGRESS NOTES
CC: Ear cleaning +  HPI: Ms. Jimenez is a 89 year old type II diabetic CF who presents today ambulating with use of a walker.    She has suffered with Meniere's disease and sequelae in the past.  Her left ear was operated on by Dr. DEBBI Liang years ago ( endolymphatic sac/shunt procedure vs other?).  She wears a hearing aid in her better hearing right ear. She has a hx of significant AS hearing loss.  She indicates interruption in obtaining her usual allergy injections ( per Dr. Ragsdale/ [place]) ; she is asking if we perform allergy management/immunotherapy here in this ENT department as Dr. Liang used to do ( we do not).  She would like to resume her allergy injections if possible.  She may consider follow-up with Dr. Jesus Oliver of the Allergy Department ( vs. allergy management with Dr. Burak Jeffery at the [facility] )   She is diligent about restricting dietary salt.    She was last examined by me in April 2014 about 9 years ago.  Her last audiometric study is duplicated below for reference.  She is scheduled for an audiometric study to be completed later on this afternoon as I understand her.  She would like to be followed by someone in our department with Meniere's experience; I have given her the name of Dr. Celestino Lehman as a consultant.            Past Medical History:   Diagnosis Date    Amblyopia of left eye 04/10/2013    Arthritis     Facet arthropathy, Lumbosacral    Atherosclerosis of aorta     Cataract     Central retinal vein occlusion of left eye     CKD (chronic kidney disease) stage 3, GFR 30-59 ml/min     Diabetes mellitus, type 2     Diabetic polyneuropathy 01/06/2022    Essential (primary) hypertension     Exotropia of both eyes 02/06/2013    recession RSR 5.0mm w/ adj; recession LR os 5.0 w/ adj; resect MR os  4.0mm    Hearing loss     History of resection of small bowel     Hypertensive retinopathy of both eyes     Hypoglycemia     Macular degeneration     OA (osteoarthritis) of shoulder     Right     Osteoporosis     Paroxysmal atrial fibrillation     Posterior vitreous detachment of both eyes     Psychiatric problem     Rhinitis     TIA (transient ischemic attack)    Allergies:  Opioids, morphine, tramadol, beta-blockers, ciprofloxacin, tizanidine    PE: Gen.:  Alert and oriented bespectacled  female in no acute distress  Both ears were examined under the microscope.  Procedures:  Firm pieces of cerumen extracted from each semi occluded ear canal with use of micro forceps +/-a curette.  Right TM is opaque and sclerotic peripherally.  Left TM is intact unless opaque.    Audiometry is scheduled for later on this morning or early this afternoon.      DIAGNOSIS:   1. Wears hearing aid in right ear        2. History of Meniere's disease        3. Impacted cerumen, bilateral        4. History of multiple allergies      got off of  her usual allergy injections for a while      5. History of ear surgery      AS (endolymphatic sac?)  surgery, Dr. DEBBI Liang      6. Asymmetrical hearing loss            PLAN:   Cerumen impactions extracted from both ear canals prior to scheduled audiometry later this afternoon  She may follow up with otologist Dr. Adrian Lehman re: Meniere's disease/symptoms p.r.n.  She may consult with Dr. Jesus Oliver re: allergy management p.r.n. ( [phone])

## 2023-03-24 ENCOUNTER — OFFICE VISIT (OUTPATIENT)
Dept: CARDIOLOGY | Facility: CLINIC | Age: 88
End: 2023-03-24
Payer: MEDICARE

## 2023-03-24 ENCOUNTER — LAB VISIT (OUTPATIENT)
Dept: LAB | Facility: HOSPITAL | Age: 88
End: 2023-03-24
Attending: INTERNAL MEDICINE
Payer: MEDICARE

## 2023-03-24 VITALS
HEIGHT: 65 IN | HEART RATE: 85 BPM | DIASTOLIC BLOOD PRESSURE: 65 MMHG | WEIGHT: 145.5 LBS | BODY MASS INDEX: 24.24 KG/M2 | SYSTOLIC BLOOD PRESSURE: 141 MMHG

## 2023-03-24 DIAGNOSIS — D50.8 IRON DEFICIENCY ANEMIA SECONDARY TO INADEQUATE DIETARY IRON INTAKE: ICD-10-CM

## 2023-03-24 DIAGNOSIS — I48.20 CHRONIC ATRIAL FIBRILLATION: Chronic | ICD-10-CM

## 2023-03-24 DIAGNOSIS — E87.5 HYPERKALEMIA: Primary | ICD-10-CM

## 2023-03-24 DIAGNOSIS — I50.32 CHRONIC DIASTOLIC HEART FAILURE: Primary | Chronic | ICD-10-CM

## 2023-03-24 DIAGNOSIS — I50.32 CHRONIC DIASTOLIC HEART FAILURE: Chronic | ICD-10-CM

## 2023-03-24 DIAGNOSIS — Z95.818 PRESENCE OF WATCHMAN LEFT ATRIAL APPENDAGE CLOSURE DEVICE: Chronic | ICD-10-CM

## 2023-03-24 DIAGNOSIS — I10 PRIMARY HYPERTENSION: Chronic | ICD-10-CM

## 2023-03-24 DIAGNOSIS — E78.00 PURE HYPERCHOLESTEROLEMIA: Chronic | ICD-10-CM

## 2023-03-24 LAB
ALBUMIN SERPL BCP-MCNC: 3.3 G/DL (ref 3.5–5.2)
ALP SERPL-CCNC: 130 U/L (ref 55–135)
ALT SERPL W/O P-5'-P-CCNC: 11 U/L (ref 10–44)
ANION GAP SERPL CALC-SCNC: 10 MMOL/L (ref 8–16)
AST SERPL-CCNC: 13 U/L (ref 10–40)
BASOPHILS # BLD AUTO: 0.01 K/UL (ref 0–0.2)
BASOPHILS NFR BLD: 0.1 % (ref 0–1.9)
BILIRUB SERPL-MCNC: 0.5 MG/DL (ref 0.1–1)
BUN SERPL-MCNC: 59 MG/DL (ref 10–30)
CALCIUM SERPL-MCNC: 9.7 MG/DL (ref 8.7–10.5)
CHLORIDE SERPL-SCNC: 104 MMOL/L (ref 95–110)
CO2 SERPL-SCNC: 22 MMOL/L (ref 23–29)
CREAT SERPL-MCNC: 1.1 MG/DL (ref 0.5–1.4)
DIFFERENTIAL METHOD: ABNORMAL
EOSINOPHIL # BLD AUTO: 0 K/UL (ref 0–0.5)
EOSINOPHIL NFR BLD: 0 % (ref 0–8)
ERYTHROCYTE [DISTWIDTH] IN BLOOD BY AUTOMATED COUNT: 18.5 % (ref 11.5–14.5)
EST. GFR  (NO RACE VARIABLE): 47.4 ML/MIN/1.73 M^2
GLUCOSE SERPL-MCNC: 96 MG/DL (ref 70–110)
HCT VFR BLD AUTO: 33.2 % (ref 37–48.5)
HGB BLD-MCNC: 10.3 G/DL (ref 12–16)
IMM GRANULOCYTES # BLD AUTO: 0.06 K/UL (ref 0–0.04)
IMM GRANULOCYTES NFR BLD AUTO: 0.8 % (ref 0–0.5)
IRON SERPL-MCNC: 75 UG/DL (ref 30–160)
LYMPHOCYTES # BLD AUTO: 0.9 K/UL (ref 1–4.8)
LYMPHOCYTES NFR BLD: 11.8 % (ref 18–48)
MCH RBC QN AUTO: 27.5 PG (ref 27–31)
MCHC RBC AUTO-ENTMCNC: 31 G/DL (ref 32–36)
MCV RBC AUTO: 89 FL (ref 82–98)
MONOCYTES # BLD AUTO: 0.4 K/UL (ref 0.3–1)
MONOCYTES NFR BLD: 5 % (ref 4–15)
NEUTROPHILS # BLD AUTO: 6.4 K/UL (ref 1.8–7.7)
NEUTROPHILS NFR BLD: 82.3 % (ref 38–73)
NRBC BLD-RTO: 0 /100 WBC
PLATELET # BLD AUTO: 339 K/UL (ref 150–450)
PMV BLD AUTO: 10.6 FL (ref 9.2–12.9)
POTASSIUM SERPL-SCNC: 6 MMOL/L (ref 3.5–5.1)
PROT SERPL-MCNC: 7.4 G/DL (ref 6–8.4)
RBC # BLD AUTO: 3.75 M/UL (ref 4–5.4)
SATURATED IRON: 21 % (ref 20–50)
SODIUM SERPL-SCNC: 136 MMOL/L (ref 136–145)
TOTAL IRON BINDING CAPACITY: 361 UG/DL (ref 250–450)
TRANSFERRIN SERPL-MCNC: 244 MG/DL (ref 200–375)
WBC # BLD AUTO: 7.81 K/UL (ref 3.9–12.7)

## 2023-03-24 PROCEDURE — 1159F MED LIST DOCD IN RCRD: CPT | Mod: CPTII,S$GLB,, | Performed by: INTERNAL MEDICINE

## 2023-03-24 PROCEDURE — 1101F PT FALLS ASSESS-DOCD LE1/YR: CPT | Mod: CPTII,S$GLB,, | Performed by: INTERNAL MEDICINE

## 2023-03-24 PROCEDURE — 99999 PR PBB SHADOW E&M-EST. PATIENT-LVL III: CPT | Mod: PBBFAC,,, | Performed by: INTERNAL MEDICINE

## 2023-03-24 PROCEDURE — 85025 COMPLETE CBC W/AUTO DIFF WBC: CPT | Mod: HCNC | Performed by: INTERNAL MEDICINE

## 2023-03-24 PROCEDURE — 3288F FALL RISK ASSESSMENT DOCD: CPT | Mod: CPTII,S$GLB,, | Performed by: INTERNAL MEDICINE

## 2023-03-24 PROCEDURE — 1126F AMNT PAIN NOTED NONE PRSNT: CPT | Mod: CPTII,S$GLB,, | Performed by: INTERNAL MEDICINE

## 2023-03-24 PROCEDURE — 99214 PR OFFICE/OUTPT VISIT, EST, LEVL IV, 30-39 MIN: ICD-10-PCS | Mod: S$GLB,,, | Performed by: INTERNAL MEDICINE

## 2023-03-24 PROCEDURE — 1160F PR REVIEW ALL MEDS BY PRESCRIBER/CLIN PHARMACIST DOCUMENTED: ICD-10-PCS | Mod: CPTII,S$GLB,, | Performed by: INTERNAL MEDICINE

## 2023-03-24 PROCEDURE — 1160F RVW MEDS BY RX/DR IN RCRD: CPT | Mod: CPTII,S$GLB,, | Performed by: INTERNAL MEDICINE

## 2023-03-24 PROCEDURE — 80053 COMPREHEN METABOLIC PANEL: CPT | Mod: HCNC | Performed by: INTERNAL MEDICINE

## 2023-03-24 PROCEDURE — 99999 PR PBB SHADOW E&M-EST. PATIENT-LVL III: ICD-10-PCS | Mod: PBBFAC,,, | Performed by: INTERNAL MEDICINE

## 2023-03-24 PROCEDURE — 99214 OFFICE O/P EST MOD 30 MIN: CPT | Mod: S$GLB,,, | Performed by: INTERNAL MEDICINE

## 2023-03-24 PROCEDURE — 1159F PR MEDICATION LIST DOCUMENTED IN MEDICAL RECORD: ICD-10-PCS | Mod: CPTII,S$GLB,, | Performed by: INTERNAL MEDICINE

## 2023-03-24 PROCEDURE — 1126F PR PAIN SEVERITY QUANTIFIED, NO PAIN PRESENT: ICD-10-PCS | Mod: CPTII,S$GLB,, | Performed by: INTERNAL MEDICINE

## 2023-03-24 PROCEDURE — 3288F PR FALLS RISK ASSESSMENT DOCUMENTED: ICD-10-PCS | Mod: CPTII,S$GLB,, | Performed by: INTERNAL MEDICINE

## 2023-03-24 PROCEDURE — 36415 COLL VENOUS BLD VENIPUNCTURE: CPT | Performed by: INTERNAL MEDICINE

## 2023-03-24 PROCEDURE — 84466 ASSAY OF TRANSFERRIN: CPT | Mod: HCNC | Performed by: INTERNAL MEDICINE

## 2023-03-24 PROCEDURE — 1101F PR PT FALLS ASSESS DOC 0-1 FALLS W/OUT INJ PAST YR: ICD-10-PCS | Mod: CPTII,S$GLB,, | Performed by: INTERNAL MEDICINE

## 2023-03-24 RX ORDER — FUROSEMIDE 20 MG/1
20 TABLET ORAL DAILY
Qty: 30 TABLET | Refills: 11 | Status: SHIPPED | OUTPATIENT
Start: 2023-03-24 | End: 2023-04-12

## 2023-03-24 RX ORDER — SPIRONOLACTONE 25 MG/1
25 TABLET ORAL DAILY
Qty: 30 TABLET | Refills: 11 | Status: SHIPPED | OUTPATIENT
Start: 2023-03-24 | End: 2023-03-24

## 2023-03-24 NOTE — PROGRESS NOTES
Subjective:   03/24/2023     Patient ID:  Jenniffer Jimenez is a 89 y.o. female who presents for evaulation of Chronic atrial fibrillation and Chronic diastolic heart failure      She comes in for cardiac follow-up.  Lots of problems recently with lower extremity cellulitis.  Hospitalized for that, developed delirium.    She has not had increasing chest pains or tightness, shortness of breath, PND or orthopnea.  She is status placement of a left atrial appendage occluder 2021, November.  Follow up in Interventional Clinic, note made that she should take SBE prophylaxis for life, she has not been doing that.    She has previous had diastolic heart failure, shortness of breath not increasing, she does take furosemide and spironolactone daily, not on her medication list at present.      Chronic atrial fibrillation is present, currently off of anticoagulation, only on aspirin.  No bleeding problems.        Prior note:    She presented for evaluation of chest pain in February of 2021.  She had noted increasing swelling in her legs.  She had taken additional furosemide.  She is status post Watchman device placement in December of 2020.  She is off anticoagulation, now on aspirin only.  She has not had bleeding problems.  She does not have a history of chest pain.  Cardiac catheterization performed many years ago was normal.  Echocardiography continue show evidence for diastolic dysfunction with increased PA pressures and increased left atrial size.  She has not had increasing shortness of breath.  Her weight has been stable, taking additional furosemide and aldosterone is needed.  Laboratory work recently has shown stable renal to and potassium.     Hypercholesterolemia is on present, on moderate dose statin therapy.  Prescription refilled     She does have chronic atrial fibrillation is status post Watchman device as noted above.  Her heart rate is well controlled.     She was felt to have acute on chronic chronic  diastolic heart failure.  My recommendations then were:  1.  Discontinue potassium chloride  2.  Take furosemide 20 mg twice a day to get rid of fluid, decrease to once a day when fluid improved.  3.  Take spironolactone 25 mg 1 with each furosemide.  4.  Call me if you have recurrent chest pain.  5.  Weigh yourself daily.  Record this and bring it in with your next visit     I saw her 2 weeks after the initial presentation.  She was markedly improved.  Recommendations then were:    1.  Decrease Lasix/furosemide and Aldactone/spironolactone to 1 a day as long as weight stays below 156 lb, okay to increase to twice a day for increased weight.  2.  Please do a blood test for me today to check kidney function.    She developed an episode of lightheadedness and her weight target was changed to 160lb.      Echocardiogram from January 2022:  · The left ventricle is normal in size with normal systolic function.  · The estimated ejection fraction is 65%.  · Severe left atrial enlargement.  · Grade III left ventricular diastolic dysfunction.  · The estimated PA systolic pressure is 51 mmHg.  · Normal right ventricular size with normal right ventricular systolic function.  · There is mild pulmonary hypertension.  · Intermediate central venous pressure (8 mmHg).  · Moderate right atrial enlargement.  · Moderate tricuspid regurgitation.  · Mild mitral regurgitation.       Recent carotid ultrasound shows mild bilateral disease:  There is 20-39% right Internal Carotid Stenosis.  There is 40-49% left Internal Carotid Stenosis.      Congestive Heart Failure  Pertinent negatives include no chest pain, claudication, near-syncope or palpitations.   Hypertension  Associated symptoms include headaches. Pertinent negatives include no chest pain, orthopnea, palpitations or PND.   Hyperlipidemia  Pertinent negatives include no chest pain.     Patient Active Problem List   Diagnosis    Pure hypercholesterolemia    Pseudophakia, both eyes     Stable central retinal vein occlusion of left eye    Chronic rhinitis    Hearing loss, sensorineural    Primary hypertension    Arthritis    Exotropia of both eyes    Debility    Gait instability    Meniere's disease    Facet arthropathy, lumbosacral    Lumbar spinal stenosis    Hypermetropia of right eye    Chronic atrial fibrillation    Stage 3b chronic kidney disease    Atherosclerosis of aorta    Chronic diastolic heart failure    History of TIA (transient ischemic attack)    Osteoporosis    Primary osteoarthritis of right shoulder    History of strabismus surgery    Encounter for long-term (current) use of antiplatelets/antithrombotics    Prediabetes    Refractive error    Posterior vitreous detachment, bilateral    Dry eye syndrome    Overweight (BMI 25.0-29.9)    Risk for falls    OAB (overactive bladder)    Venous insufficiency of both lower extremities    BRVO (branch retinal vein occlusion)    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 eye with active choroidal neovascularization    Hypertensive retinopathy of both eyes    Unspecified inflammatory spondylopathy, lumbosacral region    Presence of Watchman left atrial appendage closure device    Normocytic anemia    Cervicogenic headache    Diabetic polyneuropathy    Senile purpura    Skin tear of forearm without complication, left, initial encounter    Tear of left supraspinatus tendon    Weakness of shoulder    Impaired function of upper extremity    Normocytic normochromic anemia    Multiple open wounds of right lower extremity    Unable to care for self    Hyperkalemia    Trochanteric bursitis of right hip    Intertrigo    Generalized skin eruption due to medication taken internally    High anion gap metabolic acidosis    Delirium due to medical condition with behavioral disturbance    Pulmonary hypertension    History of diabetes mellitus        Review of patient's allergies indicates:   Allergen Reactions    Opioids - morphine analogues Other  "(See Comments)     Bowel issues; bowel obstruction    Tizanidine Other (See Comments)     "Lips were numb,  Almost passed out."    Tramadol Hallucinations    Beta-blockers (beta-adrenergic blocking agts) Other (See Comments)     Can not go on beta blockers for long period of time - due to taking allergy injections    Morphine     Opioids-meperidine and related     Ciprofloxacin Rash         Current Outpatient Medications:     acetaminophen (TYLENOL) 500 MG tablet, Take 1,000 mg by mouth 2 (two) times a day., Disp: , Rfl:     aspirin (ECOTRIN) 81 MG EC tablet, Take 1 tablet (81 mg total) by mouth once daily., Disp: 90 tablet, Rfl: 3    diclofenac sodium (VOLTAREN) 1 % Gel, Apply 2 g topically 4 (four) times daily. Use gloves to apply to right hip for 10 days, Disp: 100 g, Rfl: 1    doxycycline (VIBRA-TABS) 100 MG tablet, Take 1 tablet (100 mg total) by mouth every 12 (twelve) hours. for 10 days, Disp: 20 tablet, Rfl: 0    famotidine (PEPCID) 20 MG tablet, TAKE 1 TABLET(20 MG) BY MOUTH TWICE DAILY FOR ALLERGIC REACTION, Disp: 30 tablet, Rfl: 11    furosemide (LASIX) 20 MG tablet, Take 1 tablet (20 mg total) by mouth once daily., Disp: 30 tablet, Rfl: 11    LIDOcaine (LIDODERM) 5 %, Place 1 patch onto the skin once daily. Remove & Discard patch within 12 hours or as directed by provider. Apply to right hip., Disp: 5 patch, Rfl: 0    methylPREDNISolone (MEDROL DOSEPACK) 4 mg tablet, use as directed, Disp: 21 each, Rfl: 0    miconazole NITRATE 2 % (MICOTIN) 2 % top powder, Apply topically 2 (two) times daily. To RLE., Disp: , Rfl: 0    pravastatin (PRAVACHOL) 40 MG tablet, Take 1 tablet (40 mg total) by mouth once daily., Disp: 90 tablet, Rfl: 3    QUEtiapine (SEROQUEL) 25 MG Tab, Take 1 tablet (25 mg total) by mouth every evening., Disp: 30 tablet, Rfl: 11    silver sulfADIAZINE 1% (SILVADENE) 1 % cream, Apply to RLE skin breakdown twice daily, Disp: , Rfl:     sodium zirconium cyclosilicate (LOKELMA) 5 gram packet, " Take 1 packet (5 g total) by mouth 2 (two) times a day. Mix entire contents of packet(s) into drinking glass containing 3 tablespoons of water; stir well and drink immediately. Add water and repeat until no powder remains to receive entire dose., Disp: 60 packet, Rfl: 3    spironolactone (ALDACTONE) 25 MG tablet, Take 1 tablet (25 mg total) by mouth once daily., Disp: 30 tablet, Rfl: 11    triamcinolone acetonide 0.5% (KENALOG) 0.5 % Crea, Apply to legs with dressing changes., Disp: 454 g, Rfl: 0    vit C/E/Zn/coppr/lutein/zeaxan (OCUVITE LUTEIN AND ZEAXANTHIN ORAL), Take 1 capsule by mouth once daily. Lutien 25 mg, Zeaxanthin 5 mg, Disp: , Rfl:     vitamin E 400 UNIT capsule, Take 400 Units by mouth once daily., Disp: , Rfl:      Objective:   Review of Systems   Cardiovascular:  Negative for chest pain, claudication, cyanosis, dyspnea on exertion, irregular heartbeat, leg swelling, near-syncope, orthopnea, palpitations, paroxysmal nocturnal dyspnea and syncope.   Musculoskeletal:  Positive for arthritis and muscle cramps.   Gastrointestinal:  Positive for constipation and diarrhea.   Neurological:  Positive for headaches, loss of balance, numbness and paresthesias.     Vitals:    03/24/23 1050   BP: (!) 141/65   Pulse: 85       Physical Exam  Vitals reviewed.   Constitutional:       General: She is not in acute distress.     Appearance: She is well-developed.   HENT:      Head: Normocephalic and atraumatic.      Nose: Nose normal.   Eyes:      Conjunctiva/sclera: Conjunctivae normal.      Pupils: Pupils are equal, round, and reactive to light.   Neck:      Vascular: No JVD.   Cardiovascular:      Rate and Rhythm: Normal rate and regular rhythm.      Pulses: Intact distal pulses.      Heart sounds: Normal heart sounds. No murmur heard.    No friction rub. No gallop.      Comments: Jugular venous pressure is normal.  No edema  Pulmonary:      Effort: Pulmonary effort is normal. No respiratory distress.      Breath  sounds: Normal breath sounds. No wheezing or rales.   Chest:      Chest wall: No tenderness.   Abdominal:      General: Bowel sounds are normal. There is no distension.      Palpations: Abdomen is soft.      Tenderness: There is no abdominal tenderness.   Musculoskeletal:         General: No tenderness or deformity. Normal range of motion.      Cervical back: Normal range of motion and neck supple.      Right lower leg: No edema (None).      Left lower leg: No edema.      Comments: Both lower extremities below the knee wrapped   Skin:     General: Skin is warm and dry.      Findings: No erythema or rash.   Neurological:      Mental Status: She is alert and oriented to person, place, and time.      Cranial Nerves: No cranial nerve deficit.      Motor: No abnormal muscle tone.      Coordination: Coordination normal.   Psychiatric:         Behavior: Behavior normal.         Thought Content: Thought content normal.         Judgment: Judgment normal.        Assessment and Plan:     Chronic diastolic heart failure  Comments:  Currently on furosemide and spironolactone daily, added to list  Check lab today  Orders:  -     furosemide (LASIX) 20 MG tablet; Take 1 tablet (20 mg total) by mouth once daily.  Dispense: 30 tablet; Refill: 11  -     spironolactone (ALDACTONE) 25 MG tablet; Take 1 tablet (25 mg total) by mouth once daily.  Dispense: 30 tablet; Refill: 11  -     Comprehensive Metabolic Panel; Future; Expected date: 03/24/2023    Chronic atrial fibrillation  Comments:  Accepted as rhythm of choice    Presence of Watchman left atrial appendage closure device  Comments:  No anticoagulation    Pure hypercholesterolemia  Comments:  On moderate dose statin therapy    Primary hypertension  Comments:  Acceptable control at present    Iron deficiency anemia secondary to inadequate dietary iron intake  Comments:  Check iron level  Orders:  -     CBC Auto Differential; Future; Expected date: 03/24/2023  -     Iron and TIBC;  Future; Expected date: 03/24/2023         No follow-ups on file.

## 2023-03-26 ENCOUNTER — DOCUMENT SCAN (OUTPATIENT)
Dept: HOME HEALTH SERVICES | Facility: HOSPITAL | Age: 88
End: 2023-03-26
Payer: MEDICARE

## 2023-03-27 ENCOUNTER — LAB VISIT (OUTPATIENT)
Dept: LAB | Facility: HOSPITAL | Age: 88
End: 2023-03-27
Attending: INTERNAL MEDICINE
Payer: MEDICARE

## 2023-03-27 ENCOUNTER — HOSPITAL ENCOUNTER (OUTPATIENT)
Dept: WOUND CARE | Facility: HOSPITAL | Age: 88
Discharge: HOME | End: 2023-03-27
Attending: PREVENTIVE MEDICINE
Payer: MEDICARE

## 2023-03-27 ENCOUNTER — TELEPHONE (OUTPATIENT)
Dept: INTERNAL MEDICINE | Facility: CLINIC | Age: 88
End: 2023-03-27
Payer: MEDICARE

## 2023-03-27 ENCOUNTER — TELEPHONE (OUTPATIENT)
Dept: WOUND CARE | Facility: HOSPITAL | Age: 88
End: 2023-03-27

## 2023-03-27 VITALS
TEMPERATURE: 98 F | HEIGHT: 65 IN | BODY MASS INDEX: 24.16 KG/M2 | DIASTOLIC BLOOD PRESSURE: 78 MMHG | HEART RATE: 84 BPM | WEIGHT: 145 LBS | SYSTOLIC BLOOD PRESSURE: 133 MMHG

## 2023-03-27 DIAGNOSIS — S81.802D OPEN WOUND OF LEFT LOWER EXTREMITY, SUBSEQUENT ENCOUNTER: ICD-10-CM

## 2023-03-27 DIAGNOSIS — E87.5 HYPERKALEMIA: ICD-10-CM

## 2023-03-27 DIAGNOSIS — S81.801D MULTIPLE OPEN WOUNDS OF RIGHT LOWER EXTREMITY, SUBSEQUENT ENCOUNTER: ICD-10-CM

## 2023-03-27 DIAGNOSIS — I87.2 VENOUS STASIS DERMATITIS OF BOTH LOWER EXTREMITIES: Primary | ICD-10-CM

## 2023-03-27 LAB
ANION GAP SERPL CALC-SCNC: 10 MMOL/L (ref 8–16)
BUN SERPL-MCNC: 45 MG/DL (ref 10–30)
CALCIUM SERPL-MCNC: 9.1 MG/DL (ref 8.7–10.5)
CHLORIDE SERPL-SCNC: 101 MMOL/L (ref 95–110)
CO2 SERPL-SCNC: 24 MMOL/L (ref 23–29)
CREAT SERPL-MCNC: 1.2 MG/DL (ref 0.5–1.4)
EST. GFR  (NO RACE VARIABLE): 42.7 ML/MIN/1.73 M^2
GLUCOSE SERPL-MCNC: 94 MG/DL (ref 70–110)
POTASSIUM SERPL-SCNC: 4.4 MMOL/L (ref 3.5–5.1)
SODIUM SERPL-SCNC: 135 MMOL/L (ref 136–145)

## 2023-03-27 PROCEDURE — 80048 BASIC METABOLIC PNL TOTAL CA: CPT | Mod: HCNC | Performed by: INTERNAL MEDICINE

## 2023-03-27 PROCEDURE — 36415 COLL VENOUS BLD VENIPUNCTURE: CPT | Performed by: INTERNAL MEDICINE

## 2023-03-27 PROCEDURE — 99213 OFFICE O/P EST LOW 20 MIN: CPT | Mod: HCNC

## 2023-03-27 NOTE — PROGRESS NOTES
"                     [facility]    Subjective:       Patient ID: Jenniffer Jimenez is a 89 y.o. female.    Chief Complaint: Wound Care    Follow up for bilateral leg wounds.  No complaints, patient states that her legs "feel so much better", wound improvement noted by patient.  Continue plan of care.  Review of Systems   All other systems reviewed and are negative.      Objective:     Vitals:    03/27/23 0935   BP: 133/78   Pulse: 84   Temp: 97.5 °F (36.4 °C)         Physical Exam       Altered Skin Integrity Right anterior;lower;lateral;medial;posterior Leg (Active)       Altered Skin Integrity Present on Admission - Did Patient arrive to the hospital with altered skin?:    Side: Right   Orientation: anterior;lower;lateral;medial;posterior   Location: Leg   Wound Number:    Is this injury device related?:    Primary Wound Type:    Description of Altered Skin Integrity:    Ankle-Brachial Index:    Pulses:    Removal Indication and Assessment:    Wound Outcome:    (Retired) Wound Length (cm):    (Retired) Wound Width (cm):    (Retired) Depth (cm):    Wound Description (Comments):    Removal Indications:    Wound Image      03/27/23 0900   Dressing Appearance Intact;Clean;Dry 03/27/23 0900   Drainage Amount Scant 03/27/23 0900   Drainage Characteristics/Odor Serous 03/27/23 0900   Appearance Red;Moist 03/27/23 0900   Tissue loss description Partial thickness 03/27/23 0900   Red (%), Wound Tissue Color 100 % 03/27/23 0900   Periwound Area Intact;Dry 03/27/23 0900   Wound Edges Undefined 03/27/23 0900   Wound Length (cm) 18 cm 03/27/23 0900   Wound Width (cm) 23 cm 03/27/23 0900   Wound Depth (cm) 0 cm 03/27/23 0900   Wound Volume (cm^3) 0 cm^3 03/27/23 0900   Wound Surface Area (cm^2) 414 cm^2 03/27/23 0900   Care Cleansed with:;Soap and water;Sterile normal saline 03/27/23 0900   Dressing Applied;Changed;Non-adherent;Absorptive Pad;Rolled gauze;Tubular bandage 03/27/23 0900   Periwound " Care Topical treatment applied 03/27/23 0900   Compression Tubular elasticized bandage 03/27/23 0900   Dressing Change Due 03/29/23 03/27/23 0900            Altered Skin Integrity Left lower;lateral Leg (Active)       Altered Skin Integrity Present on Admission - Did Patient arrive to the hospital with altered skin?:    Side: Left   Orientation: lower;lateral   Location: Leg   Wound Number:    Is this injury device related?:    Primary Wound Type:    Description of Altered Skin Integrity:    Ankle-Brachial Index:    Pulses:    Removal Indication and Assessment:    Wound Outcome:    (Retired) Wound Length (cm):    (Retired) Wound Width (cm):    (Retired) Depth (cm):    Wound Description (Comments):    Removal Indications:    Wound Image    03/27/23 0900   Dressing Appearance Clean;Dry;Intact 03/27/23 0900   Drainage Amount None 03/27/23 0900   Appearance Red;Dry 03/27/23 0900   Tissue loss description Partial thickness 03/27/23 0900   Red (%), Wound Tissue Color 100 % 03/27/23 0900   Periwound Area Dry;Intact 03/27/23 0900   Wound Edges Undefined 03/27/23 0900   Wound Length (cm) 4 cm 03/27/23 0900   Wound Width (cm) 3 cm 03/27/23 0900   Wound Depth (cm) 0 cm 03/27/23 0900   Wound Volume (cm^3) 0 cm^3 03/27/23 0900   Wound Surface Area (cm^2) 12 cm^2 03/27/23 0900   Care Cleansed with:;Soap and water;Sterile normal saline 03/27/23 0900   Dressing Applied;Changed;Non-adherent;Absorptive Pad;Rolled gauze;Tubular bandage 03/27/23 0900   Periwound Care Topical treatment applied 03/27/23 0900   Compression Tubular elasticized bandage 03/27/23 0900   Dressing Change Due 03/29/23 03/27/23 0900         Assessment/Plan:           ICD-10-CM ICD-9-CM   1. Venous stasis dermatitis of both lower extremities  I87.2 454.1   2. Multiple open wounds of right lower extremity, subsequent encounter  S81.801D V58.89     894.0   3. Open wound of left lower extremity, subsequent encounter  S81.802D V58.89     891.0       Wounds slowly  improving. No signs of infection or necrosis.      Tissue pathology and/or culture taken:  [] Yes [x] No   Sharp debridement performed:   [] Yes [x] No   Labs ordered this visit:   [] Yes [x] No   Imaging ordered this visit:   [] Yes [x] No           Orders Placed This Encounter   Procedures    Change dressing     Right lower leg and left lower leg wounds   Cleanse wound with: Efrem's Soap and water   Lidocaine: PRN   Silver nitrate: PRN   Periwound care: betamethasone to dry scaly areas   Primary dressing: Xeroform to right lower leg wound, then apply 3 ABD's to the right leg and 2 ABD's to the left   Secondary dressing: cast padding, then tubigrip E to bilateral legs   Edema control: tubigrip E to bilateral legs toe to knees, elevate legs when sitting   Frequency: three times weekly and as needed   Follow-up: in 1 week with Dr Cox   [facility]: Please continue wound care as above three times weekly and as needed except when the patient is in clinic.  Please arrange for bathing assistance three times weekly before dressings are changed, ok to take off leg bandages and wash legs in the shower with soap and water.    Other orders: continue taking methylprednisolone and doxycycline as directed        Follow up in 1 week (on 4/3/2023) for Dr Cox.

## 2023-03-27 NOTE — TELEPHONE ENCOUNTER
----- Message from Steff Wilson sent at 3/27/2023  2:23 PM CDT -----  Contact: Self/[phone]  Pt said that she is calling in regards to needing to know who her Home Health  is. Please advise

## 2023-03-27 NOTE — TELEPHONE ENCOUNTER
Toya with [facility] called states patient's  insurance will only pay for home health aid once a week to assist with bathing assistance and patient lives in an assisted living facility.

## 2023-03-28 LAB — FUNGUS SPEC CULT: NORMAL

## 2023-03-30 ENCOUNTER — OFFICE VISIT (OUTPATIENT)
Dept: INTERNAL MEDICINE | Facility: CLINIC | Age: 88
End: 2023-03-30
Payer: MEDICARE

## 2023-03-30 VITALS
SYSTOLIC BLOOD PRESSURE: 108 MMHG | HEART RATE: 72 BPM | WEIGHT: 143.5 LBS | HEIGHT: 65 IN | OXYGEN SATURATION: 99 % | RESPIRATION RATE: 18 BRPM | DIASTOLIC BLOOD PRESSURE: 82 MMHG | BODY MASS INDEX: 23.91 KG/M2 | TEMPERATURE: 98 F

## 2023-03-30 DIAGNOSIS — Z95.818 PRESENCE OF WATCHMAN LEFT ATRIAL APPENDAGE CLOSURE DEVICE: Chronic | ICD-10-CM

## 2023-03-30 DIAGNOSIS — Z86.73 HISTORY OF TIA (TRANSIENT ISCHEMIC ATTACK): ICD-10-CM

## 2023-03-30 DIAGNOSIS — I87.2 VENOUS STASIS DERMATITIS OF BOTH LOWER EXTREMITIES: ICD-10-CM

## 2023-03-30 DIAGNOSIS — G63 POLYNEUROPATHY IN DISEASES CLASSIFIED ELSEWHERE: ICD-10-CM

## 2023-03-30 DIAGNOSIS — I10 PRIMARY HYPERTENSION: Chronic | ICD-10-CM

## 2023-03-30 DIAGNOSIS — N18.32 STAGE 3B CHRONIC KIDNEY DISEASE: ICD-10-CM

## 2023-03-30 DIAGNOSIS — I70.0 ATHEROSCLEROSIS OF AORTA: Chronic | ICD-10-CM

## 2023-03-30 DIAGNOSIS — I87.2 VENOUS INSUFFICIENCY OF BOTH LOWER EXTREMITIES: ICD-10-CM

## 2023-03-30 DIAGNOSIS — H34.8392 BRANCH RETINAL VEIN OCCLUSION, UNSPECIFIED COMPLICATION STATUS, UNSPECIFIED LATERALITY: ICD-10-CM

## 2023-03-30 DIAGNOSIS — M46.97 INFLAMMATORY SPONDYLOPATHY OF LUMBOSACRAL REGION: ICD-10-CM

## 2023-03-30 DIAGNOSIS — I27.20 PULMONARY HYPERTENSION: ICD-10-CM

## 2023-03-30 DIAGNOSIS — E78.00 PURE HYPERCHOLESTEROLEMIA: Chronic | ICD-10-CM

## 2023-03-30 DIAGNOSIS — N32.81 OAB (OVERACTIVE BLADDER): ICD-10-CM

## 2023-03-30 DIAGNOSIS — R73.03 PREDIABETES: ICD-10-CM

## 2023-03-30 DIAGNOSIS — I50.32 CHRONIC DIASTOLIC HEART FAILURE: Chronic | ICD-10-CM

## 2023-03-30 DIAGNOSIS — Z09 FOLLOW-UP EXAM, 3-6 MONTHS SINCE PREVIOUS EXAM: Primary | ICD-10-CM

## 2023-03-30 DIAGNOSIS — D69.2 SENILE PURPURA: ICD-10-CM

## 2023-03-30 DIAGNOSIS — I48.20 CHRONIC ATRIAL FIBRILLATION: Chronic | ICD-10-CM

## 2023-03-30 DIAGNOSIS — R53.81 DEBILITY: ICD-10-CM

## 2023-03-30 DIAGNOSIS — H35.033 HYPERTENSIVE RETINOPATHY OF BOTH EYES: ICD-10-CM

## 2023-03-30 DIAGNOSIS — H35.3221 EXUDATIVE AGE-RELATED MACULAR DEGENERATION OF LEFT EYE WITH ACTIVE CHOROIDAL NEOVASCULARIZATION: ICD-10-CM

## 2023-03-30 DIAGNOSIS — M81.6 LOCALIZED OSTEOPOROSIS, UNSPECIFIED PATHOLOGICAL FRACTURE PRESENCE: ICD-10-CM

## 2023-03-30 PROCEDURE — 3288F FALL RISK ASSESSMENT DOCD: CPT | Mod: HCNC,CPTII,S$GLB, | Performed by: FAMILY MEDICINE

## 2023-03-30 PROCEDURE — 99999 PR PBB SHADOW E&M-EST. PATIENT-LVL V: CPT | Mod: PBBFAC,HCNC,, | Performed by: FAMILY MEDICINE

## 2023-03-30 PROCEDURE — 1101F PR PT FALLS ASSESS DOC 0-1 FALLS W/OUT INJ PAST YR: ICD-10-PCS | Mod: HCNC,CPTII,S$GLB, | Performed by: FAMILY MEDICINE

## 2023-03-30 PROCEDURE — 99215 PR OFFICE/OUTPT VISIT, EST, LEVL V, 40-54 MIN: ICD-10-PCS | Mod: HCNC,S$GLB,, | Performed by: FAMILY MEDICINE

## 2023-03-30 PROCEDURE — 1159F PR MEDICATION LIST DOCUMENTED IN MEDICAL RECORD: ICD-10-PCS | Mod: HCNC,CPTII,S$GLB, | Performed by: FAMILY MEDICINE

## 2023-03-30 PROCEDURE — 99215 OFFICE O/P EST HI 40 MIN: CPT | Mod: HCNC,S$GLB,, | Performed by: FAMILY MEDICINE

## 2023-03-30 PROCEDURE — 99999 PR PBB SHADOW E&M-EST. PATIENT-LVL V: ICD-10-PCS | Mod: PBBFAC,HCNC,, | Performed by: FAMILY MEDICINE

## 2023-03-30 PROCEDURE — 3288F PR FALLS RISK ASSESSMENT DOCUMENTED: ICD-10-PCS | Mod: HCNC,CPTII,S$GLB, | Performed by: FAMILY MEDICINE

## 2023-03-30 PROCEDURE — 1160F PR REVIEW ALL MEDS BY PRESCRIBER/CLIN PHARMACIST DOCUMENTED: ICD-10-PCS | Mod: HCNC,CPTII,S$GLB, | Performed by: FAMILY MEDICINE

## 2023-03-30 PROCEDURE — 1101F PT FALLS ASSESS-DOCD LE1/YR: CPT | Mod: HCNC,CPTII,S$GLB, | Performed by: FAMILY MEDICINE

## 2023-03-30 PROCEDURE — 1126F PR PAIN SEVERITY QUANTIFIED, NO PAIN PRESENT: ICD-10-PCS | Mod: HCNC,CPTII,S$GLB, | Performed by: FAMILY MEDICINE

## 2023-03-30 PROCEDURE — 1126F AMNT PAIN NOTED NONE PRSNT: CPT | Mod: HCNC,CPTII,S$GLB, | Performed by: FAMILY MEDICINE

## 2023-03-30 PROCEDURE — 1160F RVW MEDS BY RX/DR IN RCRD: CPT | Mod: HCNC,CPTII,S$GLB, | Performed by: FAMILY MEDICINE

## 2023-03-30 PROCEDURE — 1159F MED LIST DOCD IN RCRD: CPT | Mod: HCNC,CPTII,S$GLB, | Performed by: FAMILY MEDICINE

## 2023-03-30 NOTE — PROGRESS NOTES
Subjective     Patient ID: Jenniffer Jimenez is a 89 y.o. female.    Chief Complaint: Annual Exam    HPI 89-year-old white female patient of Dr. Hernandez with chronic diastolic heart failure, pulmonary hypertension, atrial fibrillation status post Watchman insertion, aortic atherosclerosis, hypertension, hypercholesterolemia, venous insufficiency, branch retinal vein occlusion, macular degeneration, hypertensive retinopathy, prediabetes, stage 3 chronic kidney disease, peripheral neuropathy, previous TIA, osteoporosis, overactive bladder, senile purpura, debility, inflammatory spondylopathy of the lumbosacral region, and lower extremity wounds secondary to venous stasis dermatitis presents to clinic today for follow-up.  She is currently being seen by wound care secondary to the lower extremity wounds which remains stable.  She has been followed by Cardiology for further evaluation of chronic diastolic heart failure and has been noted to have hyperkalemia.  Spironolactone has been discontinued and the patient's hyperkalemia has resolved.  At this time she is without complaints.  Review of Systems   Constitutional:  Negative for appetite change, chills, fatigue and fever.   HENT:  Negative for nasal congestion, ear pain, hearing loss, postnasal drip, rhinorrhea, sinus pressure/congestion, sore throat and tinnitus.    Eyes:  Negative for redness, itching and visual disturbance.   Respiratory:  Negative for cough, chest tightness and shortness of breath.    Cardiovascular:  Negative for chest pain and palpitations.   Gastrointestinal:  Positive for constipation and reflux. Negative for abdominal pain, diarrhea, nausea and vomiting.   Genitourinary:  Negative for decreased urine volume, difficulty urinating, dysuria, frequency, hematuria and urgency.   Musculoskeletal:  Negative for back pain, myalgias, neck pain and neck stiffness.   Integumentary:  Negative for rash.   Neurological:  Negative for dizziness,  light-headedness and headaches.   Psychiatric/Behavioral: Negative.          Objective     Physical Exam  Vitals and nursing note reviewed.   Constitutional:       General: She is not in acute distress.     Appearance: She is well-developed. She is not diaphoretic.   HENT:      Head: Normocephalic and atraumatic.      Right Ear: External ear normal.      Left Ear: External ear normal.      Nose: Nose normal.      Mouth/Throat:      Pharynx: No oropharyngeal exudate.   Eyes:      General: No scleral icterus.        Right eye: No discharge.         Left eye: No discharge.      Conjunctiva/sclera: Conjunctivae normal.      Pupils: Pupils are equal, round, and reactive to light.   Neck:      Thyroid: No thyromegaly.      Vascular: No JVD.      Trachea: No tracheal deviation.   Cardiovascular:      Rate and Rhythm: Normal rate and regular rhythm.      Heart sounds: Normal heart sounds. No murmur heard.    No friction rub. No gallop.   Pulmonary:      Effort: Pulmonary effort is normal. No respiratory distress.      Breath sounds: Normal breath sounds. No stridor. No wheezing or rales.   Abdominal:      General: Bowel sounds are normal. There is no distension.      Palpations: Abdomen is soft. There is no mass.      Tenderness: There is no abdominal tenderness. There is no guarding or rebound.   Musculoskeletal:         General: No tenderness. Normal range of motion.      Cervical back: Normal range of motion and neck supple.      Comments: Ambulates with assistance of walker.   Lymphadenopathy:      Cervical: No cervical adenopathy.   Skin:     General: Skin is warm and dry.      Coloration: Skin is not pale.      Findings: No erythema or rash.      Comments: Bilateral lower extremities in compression dressings   Neurological:      Mental Status: She is alert and oriented to person, place, and time.   Psychiatric:         Behavior: Behavior normal.         Thought Content: Thought content normal.         Judgment:  Judgment normal.        Assessment and Plan        1. Continue aspirin 81 mg daily and Lasix 20 mg daily.  Chronic diastolic heart failure, pulmonary hypertension, and atrial fibrillation remained stable.  Continue follow-up with cardiology as scheduled.    2. Continue aspirin 81 mg daily and pravastatin 40 mg daily.  Atherosclerosis and hypercholesterolemia remained stable.    3. Hypertension remains well controlled on Lasix 40 mg daily.  Will continue to monitor.    4. Continue to follow-up with wound care as scheduled for continued evaluation and treatment of venous stasis dermatitis of the lower extremities.  5. Continue follow-up with Ophthalmology as scheduled for treatment of branch retinal vein occlusion, macular degeneration, and hypertensive retinopathy.  6. Prediabetes remains stable.  7. Stage 3 chronic kidney disease has improved with discontinuation of spironolactone.  Encourage hydration.    8. Continue follow-up with Wound Care as scheduled.  Peripheral neuropathy remains stable.    9. Patient had a previous TIA and remains stable.    10. Overactive bladder is stable.  11. Senile purpura is stable.  12. Continue use of walker.  Debility is stable.    13. Lumbosacral spondylopathy is stable.    14. Return to clinic as needed or in 3 months for annual physical exam.    40 minutes have been spent on this clinic visit.

## 2023-04-03 ENCOUNTER — HOSPITAL ENCOUNTER (OUTPATIENT)
Dept: WOUND CARE | Facility: HOSPITAL | Age: 88
Discharge: HOME | End: 2023-04-03
Attending: PREVENTIVE MEDICINE
Payer: MEDICARE

## 2023-04-03 VITALS
BODY MASS INDEX: 23.82 KG/M2 | TEMPERATURE: 96 F | WEIGHT: 143 LBS | HEART RATE: 73 BPM | SYSTOLIC BLOOD PRESSURE: 124 MMHG | DIASTOLIC BLOOD PRESSURE: 59 MMHG | HEIGHT: 65 IN

## 2023-04-03 DIAGNOSIS — L30.4 INTERTRIGO: ICD-10-CM

## 2023-04-03 DIAGNOSIS — I87.2 VENOUS STASIS DERMATITIS OF BOTH LOWER EXTREMITIES: Primary | ICD-10-CM

## 2023-04-03 DIAGNOSIS — S81.801D MULTIPLE OPEN WOUNDS OF RIGHT LOWER EXTREMITY, SUBSEQUENT ENCOUNTER: ICD-10-CM

## 2023-04-03 PROCEDURE — 99213 OFFICE O/P EST LOW 20 MIN: CPT | Mod: HCNC

## 2023-04-03 RX ORDER — FLUCONAZOLE 150 MG/1
150 TABLET ORAL DAILY
Qty: 1 TABLET | Refills: 0 | Status: SHIPPED | OUTPATIENT
Start: 2023-04-03 | End: 2023-04-05

## 2023-04-03 RX ORDER — NYSTATIN 100000 [USP'U]/G
POWDER TOPICAL 2 TIMES DAILY
Qty: 30 G | Refills: 1 | Status: ON HOLD | OUTPATIENT
Start: 2023-04-03 | End: 2023-04-27 | Stop reason: HOSPADM

## 2023-04-03 NOTE — PROGRESS NOTES
[facility]    Subjective:       Patient ID: Jenniffer Jimenez is a 89 y.o. female.    Chief Complaint: Wound Care    Follow up for bilateral leg wounds.  Left leg wounds are healed. No new complaints. New orders noted  Review of Systems   All other systems reviewed and are negative.      Objective:     Vitals:    04/03/23 0925   BP: (!) 124/59   Pulse: 73   Temp: 96 °F (35.6 °C)         Physical Exam       Altered Skin Integrity Right anterior;lower;lateral;medial;posterior Leg (Active)       Altered Skin Integrity Present on Admission - Did Patient arrive to the hospital with altered skin?:    Side: Right   Orientation: anterior;lower;lateral;medial;posterior   Location: Leg   Wound Number:    Is this injury device related?:    Primary Wound Type:    Description of Altered Skin Integrity:    Ankle-Brachial Index:    Pulses:    Removal Indication and Assessment:    Wound Outcome:    (Retired) Wound Length (cm):    (Retired) Wound Width (cm):    (Retired) Depth (cm):    Wound Description (Comments):    Removal Indications:    Wound Image   04/03/23 0937   Dressing Appearance Intact;Clean;Dry 04/03/23 0937   Drainage Amount Scant 04/03/23 0937   Drainage Characteristics/Odor Serous 04/03/23 0937   Appearance Intact;Red 04/03/23 0937   Tissue loss description Partial thickness 04/03/23 0937   Red (%), Wound Tissue Color 100 % 04/03/23 0937   Periwound Area Intact;Dry 04/03/23 0937   Wound Edges Undefined 04/03/23 0937   Wound Length (cm) 18 cm 04/03/23 0937   Wound Width (cm) 23 cm 04/03/23 0937   Wound Depth (cm) 0 cm 04/03/23 0937   Wound Volume (cm^3) 0 cm^3 04/03/23 0937   Wound Surface Area (cm^2) 414 cm^2 04/03/23 0937   Care Cleansed with:;Soap and water;Sterile normal saline 04/03/23 0937   Dressing Applied;Changed;Non-adherent;Absorptive Pad;Tubular bandage 04/03/23 0937   Periwound Care Topical treatment applied 04/03/23 0937   Compression Tubular  elasticized bandage 04/03/23 0937   Dressing Change Due 04/05/23 04/03/23 0937            Altered Skin Integrity Left lower;lateral Leg (Active)       Altered Skin Integrity Present on Admission - Did Patient arrive to the hospital with altered skin?:    Side: Left   Orientation: lower;lateral   Location: Leg   Wound Number:    Is this injury device related?:    Primary Wound Type:    Description of Altered Skin Integrity:    Ankle-Brachial Index:    Pulses:    Removal Indication and Assessment:    Wound Outcome:    (Retired) Wound Length (cm):    (Retired) Wound Width (cm):    (Retired) Depth (cm):    Wound Description (Comments):    Removal Indications:    Wound Image   04/03/23 0937   Dressing Appearance Intact 04/03/23 0937   Drainage Amount None 04/03/23 0937   Appearance Intact 04/03/23 0937   Wound Length (cm) 0 cm 04/03/23 0937   Wound Width (cm) 0 cm 04/03/23 0937   Wound Depth (cm) 0 cm 04/03/23 0937   Wound Volume (cm^3) 0 cm^3 04/03/23 0937   Wound Surface Area (cm^2) 0 cm^2 04/03/23 0937   Care Cleansed with:;Soap and water 04/03/23 0937   Dressing Applied;Changed;Tubular bandage 04/03/23 0937   Periwound Care Moisturizer applied 04/03/23 0937   Compression Tubular elasticized bandage 04/03/23 0937   Dressing Change Due 04/24/23 04/03/23 0937         Assessment/Plan:         ICD-10-CM ICD-9-CM   1. Venous stasis dermatitis of both lower extremities  I87.2 454.1   2. Multiple open wounds of right lower extremity, subsequent encounter  S81.801D V58.89     894.0   3. Intertrigo  L30.4 695.89           Tissue pathology and/or culture taken:  [] Yes [x] No   Sharp debridement performed:   [] Yes [x] No   Labs ordered this visit:   [] Yes [x] No   Imaging ordered this visit:   [] Yes [x] No           Orders Placed This Encounter   Procedures    Change dressing     Right lower leg:  Cleanse wound with: Efrem's Soap and water   Lidocaine: PRN   Silver nitrate: PRN   Periwound care: betamethasone to dry scaly  areas   Primary dressing: Xeroform to right lower leg wound, then apply 3 ABD's   Secondary dressing: cast padding, then tubigrip E to bilateral legs. Lotion and Tubi  to LLE   Edema control: tubigrip E to bilateral legs toe to knees, elevate legs when sitting   Frequency: three times weekly and as needed   Follow-up: in 3 weeks with Dr Cox   [facility]: Please continue wound care as above three times weekly and as needed except when the patient is in clinic.  Please arrange for bathing assistance three times weekly before dressings are changed, ok to take off leg bandages and wash legs in the shower with soap and water.     Other orders: Rx for Nystatin powder and Diflucan tab sent to patient's pharmacy        Follow up in about 3 weeks (around 4/24/2023).

## 2023-04-05 ENCOUNTER — TELEPHONE (OUTPATIENT)
Dept: INTERNAL MEDICINE | Facility: CLINIC | Age: 88
End: 2023-04-05
Payer: MEDICARE

## 2023-04-05 DIAGNOSIS — R73.03 PREDIABETES: Primary | ICD-10-CM

## 2023-04-05 NOTE — TELEPHONE ENCOUNTER
----- Message from Sarah Byrne sent at 4/5/2023  8:46 AM CDT -----  Contact: [phone]  Pt called to advise that she has on her notes that she needs a diabetic urine test. She would like to have it done when she comes in on 4/6/23 before her appointment. Pt says she is deaf and may not hear the phone; however if you make the appointment she will be there. Please Advise

## 2023-04-06 ENCOUNTER — TELEPHONE (OUTPATIENT)
Dept: CARDIOLOGY | Facility: CLINIC | Age: 88
End: 2023-04-06
Payer: MEDICARE

## 2023-04-06 ENCOUNTER — CLINICAL SUPPORT (OUTPATIENT)
Dept: AUDIOLOGY | Facility: CLINIC | Age: 88
End: 2023-04-06
Payer: MEDICARE

## 2023-04-06 ENCOUNTER — LAB VISIT (OUTPATIENT)
Dept: LAB | Facility: HOSPITAL | Age: 88
End: 2023-04-06
Attending: INTERNAL MEDICINE
Payer: MEDICARE

## 2023-04-06 ENCOUNTER — NURSE TRIAGE (OUTPATIENT)
Dept: ADMINISTRATIVE | Facility: CLINIC | Age: 88
End: 2023-04-06
Payer: MEDICARE

## 2023-04-06 DIAGNOSIS — H90.3 ASYMMETRICAL SENSORINEURAL HEARING LOSS: Primary | ICD-10-CM

## 2023-04-06 DIAGNOSIS — H91.90 HEARING LOSS, UNSPECIFIED HEARING LOSS TYPE, UNSPECIFIED LATERALITY: ICD-10-CM

## 2023-04-06 DIAGNOSIS — R73.03 PREDIABETES: ICD-10-CM

## 2023-04-06 LAB
ALBUMIN/CREAT UR: 23.3 UG/MG (ref 0–30)
CREAT UR-MCNC: 86 MG/DL (ref 15–325)
MICROALBUMIN UR DL<=1MG/L-MCNC: 20 UG/ML

## 2023-04-06 PROCEDURE — 99499 UNLISTED E&M SERVICE: CPT | Mod: HCNC,S$GLB,, | Performed by: AUDIOLOGIST

## 2023-04-06 PROCEDURE — 99499 NO LOS: ICD-10-PCS | Mod: HCNC,S$GLB,, | Performed by: AUDIOLOGIST

## 2023-04-06 PROCEDURE — 82570 ASSAY OF URINE CREATININE: CPT | Mod: HCNC | Performed by: INTERNAL MEDICINE

## 2023-04-06 NOTE — TELEPHONE ENCOUNTER
Patient with episode of weakness today while she was at the audiologist.  She did not want to go to urgent care.  I have advised her to decrease furosemide every other day.  She is now off of spironolactone due to hyperkalemia.  Will fit her in next week for a visit.

## 2023-04-06 NOTE — PROGRESS NOTES
Hearing Aid Follow-up:    Ms. Abad was seen today for her 2 week hearing aid follow-up. Ms. Abad was accompanied by her daughter, Afshan. Ms. Abad reported she is hearing better and especially notices a difference when talking on her cell phone. Ms. Abad reported her left ear mold seems to slip out. A new impressions was taken (without incident) for the left ear and her cshell will be sent to [facility] for remake. Ms. Abad's overall gain was turned to 98% target gain. She reported comfortable sound quality. Ms. Abad and Afshan were counseled on how to properly clean the hearing aids and change the wax filters. Ms. Abad was reminded she can bring her hearing aids to the hearing aid office if she needs assistance cleaning the hearing aids in between visits. Ms. Abad will return to the clinic in two weeks to be fit with her re-made cshell. Will speech-map the hearing aids at this appointment.

## 2023-04-06 NOTE — TELEPHONE ENCOUNTER
----- Message from Margaret Olmos MA sent at 4/6/2023  4:01 PM CDT -----  Regarding: FW: Weak spell    ----- Message -----  From: Juhi Brooks  Sent: 4/6/2023   4:00 PM CDT  To: [facility] Staff  Subject: Weak spell                                       PT had a weak spell and experienced dizziness.  Pt wants to discuss possible medication changes. PT fell and does not want to go to the ED.  PT would like a call back from Dr. MARCUM  @ [phone].  Pt has a new hearing aid.        Thanks

## 2023-04-06 NOTE — TELEPHONE ENCOUNTER
I called patient and she refused to go to the ER Stating that she dose not want to go.  Told her the importance tof her going to get checked .

## 2023-04-06 NOTE — TELEPHONE ENCOUNTER
"Patient states she had a "weak spell" while at her clinic appointment today. Patient states she fell to the floor at this time and refused help from the clinic staff, was put in a wheelchair and wheeled to her car. Patient states she believes she had a TIA at the time.     Patient refused triage/assessment services at this time and stated she does not want to go to an ED due to recent hospital stay. Triage RN attempted to encourage patient to complete triage due to severity of symptoms reported by [facility]  and patient's statements of falling. Patient maintained refusal for triage and states she "only wants to schedule an appointment with her Doctor" and believes Triage RN is "refusing to give her care" by not abiding by her wishes. Patient abruptly ended call by hanging up.     Secure message sent to patient's PCP and case routed as "High Priority".     Reason for Disposition   [1] Angry or rude caller AND [2] doesn't respond to 5 minutes of triager counseling AND [3] sick adult (or caller)    Additional Information   Negative: Violent behavior, or threatening to physically hurt or kill someone   Negative: [1] Caller is very confused AND [2] no other adult (e.g., friend or family member) available   Negative: Sounds like a life-threatening emergency to the triager   Negative: Child abuse suspected   Negative: Suicide thoughts, threats, attempts, or questions   Negative: Patient sounds very sick or weak to the triager   Negative: [1] [place] worried caller AND [2] second call within 4 hours about the same medical problem   Negative: [1] [place] worried caller AND [2] third call within 48 hours about the same medical problem   Negative: [1] [place] worried caller AND [2] can't be reassured by triager   Negative: [1] Caller demands to speak with the PCP AND [2] about sick adult (or sick caller)    Protocols used: Difficult Call-A-AH    "

## 2023-04-07 ENCOUNTER — HOSPITAL ENCOUNTER (EMERGENCY)
Facility: HOSPITAL | Age: 88
Discharge: HOME | End: 2023-04-07
Attending: EMERGENCY MEDICINE
Payer: MEDICARE

## 2023-04-07 VITALS
DIASTOLIC BLOOD PRESSURE: 61 MMHG | RESPIRATION RATE: 23 BRPM | TEMPERATURE: 98 F | OXYGEN SATURATION: 98 % | SYSTOLIC BLOOD PRESSURE: 138 MMHG | HEART RATE: 71 BPM

## 2023-04-07 DIAGNOSIS — E86.0 DEHYDRATION: Primary | ICD-10-CM

## 2023-04-07 DIAGNOSIS — R53.1 WEAKNESS: ICD-10-CM

## 2023-04-07 LAB
ALBUMIN SERPL BCP-MCNC: 3.1 G/DL (ref 3.5–5.2)
ALP SERPL-CCNC: 102 U/L (ref 55–135)
ALT SERPL W/O P-5'-P-CCNC: 12 U/L (ref 10–44)
ANION GAP SERPL CALC-SCNC: 13 MMOL/L (ref 8–16)
AST SERPL-CCNC: 12 U/L (ref 10–40)
BASOPHILS # BLD AUTO: 0.03 K/UL (ref 0–0.2)
BASOPHILS NFR BLD: 0.5 % (ref 0–1.9)
BILIRUB SERPL-MCNC: 0.4 MG/DL (ref 0.1–1)
BNP SERPL-MCNC: 139 PG/ML (ref 0–99)
BUN SERPL-MCNC: 20 MG/DL (ref 10–30)
CALCIUM SERPL-MCNC: 8.4 MG/DL (ref 8.7–10.5)
CHLORIDE SERPL-SCNC: 106 MMOL/L (ref 95–110)
CO2 SERPL-SCNC: 18 MMOL/L (ref 23–29)
CREAT SERPL-MCNC: 0.9 MG/DL (ref 0.5–1.4)
DIFFERENTIAL METHOD: ABNORMAL
EOSINOPHIL # BLD AUTO: 0.1 K/UL (ref 0–0.5)
EOSINOPHIL NFR BLD: 0.9 % (ref 0–8)
ERYTHROCYTE [DISTWIDTH] IN BLOOD BY AUTOMATED COUNT: 18.7 % (ref 11.5–14.5)
EST. GFR  (NO RACE VARIABLE): >60 ML/MIN/1.73 M^2
GLUCOSE SERPL-MCNC: 96 MG/DL (ref 70–110)
HCT VFR BLD AUTO: 27.1 % (ref 37–48.5)
HGB BLD-MCNC: 8.9 G/DL (ref 12–16)
IMM GRANULOCYTES # BLD AUTO: 0.03 K/UL (ref 0–0.04)
IMM GRANULOCYTES NFR BLD AUTO: 0.5 % (ref 0–0.5)
LYMPHOCYTES # BLD AUTO: 1 K/UL (ref 1–4.8)
LYMPHOCYTES NFR BLD: 18.5 % (ref 18–48)
MAGNESIUM SERPL-MCNC: 2.1 MG/DL (ref 1.6–2.6)
MCH RBC QN AUTO: 28.5 PG (ref 27–31)
MCHC RBC AUTO-ENTMCNC: 32.8 G/DL (ref 32–36)
MCV RBC AUTO: 87 FL (ref 82–98)
MONOCYTES # BLD AUTO: 0.6 K/UL (ref 0.3–1)
MONOCYTES NFR BLD: 10.3 % (ref 4–15)
NEUTROPHILS # BLD AUTO: 3.9 K/UL (ref 1.8–7.7)
NEUTROPHILS NFR BLD: 69.3 % (ref 38–73)
NRBC BLD-RTO: 0 /100 WBC
PLATELET # BLD AUTO: 181 K/UL (ref 150–450)
PMV BLD AUTO: 10.5 FL (ref 9.2–12.9)
POTASSIUM SERPL-SCNC: 3.9 MMOL/L (ref 3.5–5.1)
PROT SERPL-MCNC: 6.6 G/DL (ref 6–8.4)
RBC # BLD AUTO: 3.12 M/UL (ref 4–5.4)
SODIUM SERPL-SCNC: 137 MMOL/L (ref 136–145)
TROPONIN I SERPL DL<=0.01 NG/ML-MCNC: <0.006 NG/ML (ref 0–0.03)
WBC # BLD AUTO: 5.61 K/UL (ref 3.9–12.7)

## 2023-04-07 PROCEDURE — 85025 COMPLETE CBC W/AUTO DIFF WBC: CPT | Mod: HCNC | Performed by: EMERGENCY MEDICINE

## 2023-04-07 PROCEDURE — 93005 ELECTROCARDIOGRAM TRACING: CPT | Mod: HCNC

## 2023-04-07 PROCEDURE — 83735 ASSAY OF MAGNESIUM: CPT | Mod: HCNC | Performed by: EMERGENCY MEDICINE

## 2023-04-07 PROCEDURE — 84484 ASSAY OF TROPONIN QUANT: CPT | Mod: HCNC | Performed by: EMERGENCY MEDICINE

## 2023-04-07 PROCEDURE — 99285 EMERGENCY DEPT VISIT HI MDM: CPT | Mod: 25,HCNC

## 2023-04-07 PROCEDURE — 80053 COMPREHEN METABOLIC PANEL: CPT | Mod: HCNC | Performed by: EMERGENCY MEDICINE

## 2023-04-07 PROCEDURE — 83880 ASSAY OF NATRIURETIC PEPTIDE: CPT | Mod: HCNC | Performed by: EMERGENCY MEDICINE

## 2023-04-07 PROCEDURE — 93010 ELECTROCARDIOGRAM REPORT: CPT | Mod: HCNC,,, | Performed by: INTERNAL MEDICINE

## 2023-04-07 PROCEDURE — 93010 EKG 12-LEAD: ICD-10-PCS | Mod: HCNC,,, | Performed by: INTERNAL MEDICINE

## 2023-04-08 NOTE — ED PROVIDER NOTES
"Encounter Date: 4/7/2023       History     Chief Complaint   Patient presents with    Fall     Pt tripped and almost fell today; pt was caught by RN. Pt and daughter were told to come into ED. Pt is Aox3, generalized weakness, and she states: "I just don't feel like myself; I feel slow."     Jenniffer Jimenez is a 89 y.o. female who  has a past medical history of Amblyopia of left eye (04/10/2013), Arthritis, Atherosclerosis of aorta, Cataract, Central retinal vein occlusion of left eye, CKD (chronic kidney disease) stage 3, GFR 30-59 ml/min, Diabetes mellitus, type 2, Diabetic polyneuropathy (01/06/2022), Essential (primary) hypertension, Exotropia of both eyes (02/06/2013), Hearing loss, History of resection of small bowel, Hypertensive retinopathy of both eyes, Hypoglycemia, Macular degeneration, OA (osteoarthritis) of shoulder, Osteoporosis, Paroxysmal atrial fibrillation, Posterior vitreous detachment of both eyes, Psychiatric problem, Rhinitis, and TIA (transient ischemic attack).    The patient presents to the ED due to a fall yesterday.  Patient stood up from a sitting position and fell. This was at a medical appointment and she was caught before she fell and hurt herself.   She denied loss consciousness.  She saw any pain at this time.  She states that she feels generally weak and not like herself since the fall.  She denies any headache vision changes numbness weakness dizziness chest pain or trouble breathing. She walks with a  walker and has been able to do so.     The history is provided by the patient and a relative.   Review of patient's allergies indicates:   Allergen Reactions    Opioids - morphine analogues Other (See Comments)     Bowel issues; bowel obstruction    Tizanidine Other (See Comments)     "Lips were numb,  Almost passed out."    Tramadol Hallucinations    Beta-blockers (beta-adrenergic blocking agts) Other (See Comments)     Can not go on beta blockers for long period of time - due " to taking allergy injections    Morphine     Opioids-meperidine and related     Ciprofloxacin Rash     Past Medical History:   Diagnosis Date    Amblyopia of left eye 04/10/2013    Arthritis     Facet arthropathy, Lumbosacral    Atherosclerosis of aorta     Cataract     Central retinal vein occlusion of left eye     CKD (chronic kidney disease) stage 3, GFR 30-59 ml/min     Diabetes mellitus, type 2     Diabetic polyneuropathy 2022    Essential (primary) hypertension     Exotropia of both eyes 2013    recession RSR 5.0mm w/ adj; recession LR os 5.0 w/ adj; resect MR os  4.0mm    Hearing loss     History of resection of small bowel     Hypertensive retinopathy of both eyes     Hypoglycemia     Macular degeneration     OA (osteoarthritis) of shoulder     Right    Osteoporosis     Paroxysmal atrial fibrillation     Posterior vitreous detachment of both eyes     Psychiatric problem     Rhinitis     TIA (transient ischemic attack)      Past Surgical History:   Procedure Laterality Date    APPENDECTOMY      CARDIAC CATHETERIZATION      CATARACT EXTRACTION W/  INTRAOCULAR LENS IMPLANT Bilateral      SECTION, CLASSIC      CLOSURE OF LEFT ATRIAL APPENDAGE USING DEVICE N/A 2020    Procedure: Left atrial appendage closure device;  Surgeon: Abundio Curtis MD;  Location: [facility] CATH LAB;  Service: Cardiology;  Laterality: N/A;    HYSTERECTOMY      INNER EAR SURGERY      JOINT REPLACEMENT      LEFT KNEE REPLACEMENT IN 2013 -    OOPHORECTOMY      SINUS SURGERY      STRABISMUS SURGERY  13    RSR recession 5 mm, LLR recession 5 mm and LMR resection 4mm    STRABISMUS SURGERY  2014    recess LR OD 6mm    TONSILLECTOMY      watchman surgery N/A 2020     Family History   Problem Relation Age of Onset    Hypertension Mother     Hypertension Father     Liver disease Sister     Diabetes Sister     Heart disease Sister     Diabetes Brother     Breast cancer Maternal Aunt     No Known Problems Maternal  Uncle     No Known Problems Paternal Aunt     No Known Problems Paternal Uncle     No Known Problems Maternal Grandmother     No Known Problems Maternal Grandfather     No Known Problems Paternal Grandmother     No Known Problems Paternal Grandfather     No Known Problems Daughter     No Known Problems Son     Heart disease Sister     No Known Problems Son     No Known Problems Son     Cancer Neg Hx         in first degree relatives    Melanoma Neg Hx     Psoriasis Neg Hx     Lupus Neg Hx     Amblyopia Neg Hx     Blindness Neg Hx     Cataracts Neg Hx     Glaucoma Neg Hx     Macular degeneration Neg Hx     Retinal detachment Neg Hx     Strabismus Neg Hx     Stroke Neg Hx     Thyroid disease Neg Hx      Social History     Tobacco Use    Smoking status: Former     Types: Cigarettes     Quit date: 10/29/1982     Years since quittin.4     Passive exposure: Never    Smokeless tobacco: Never   Substance Use Topics    Alcohol use: Not Currently     Alcohol/week: 2.0 standard drinks     Types: 2 Shots of liquor per week     Comment: rare    Drug use: No     Review of Systems   Constitutional:  Negative for chills and fever.   HENT:  Negative for sore throat.    Respiratory:  Negative for cough and shortness of breath.    Cardiovascular:  Negative for chest pain.   Gastrointestinal:  Negative for nausea and vomiting.   Genitourinary:  Negative for dysuria, frequency and urgency.   Musculoskeletal:  Negative for back pain and neck pain.   Skin:  Negative for rash and wound.   Neurological:  Positive for weakness. Negative for syncope.   Hematological:  Does not bruise/bleed easily.   Psychiatric/Behavioral:  Negative for agitation, behavioral problems and confusion.      Physical Exam     Initial Vitals [23 1834]   BP Pulse Resp Temp SpO2   (!) 121/58 74 20 98.3 °F (36.8 °C) 98 %      MAP       --         Physical Exam    Nursing note and vitals reviewed.  Constitutional: She appears well-developed and  well-nourished. She is not diaphoretic. No distress.   HENT:   Head: Normocephalic and atraumatic.   Mouth/Throat: Oropharynx is clear and moist.   Eyes: EOM are normal. Pupils are equal, round, and reactive to light.   Neck: No tracheal deviation present.   Cardiovascular:  Normal rate, regular rhythm, normal heart sounds and intact distal pulses.           Pulmonary/Chest: Breath sounds normal. No stridor. No respiratory distress. She has no wheezes.   Abdominal: Abdomen is soft. Bowel sounds are normal. She exhibits no distension and no mass. There is no abdominal tenderness.   Musculoskeletal:         General: No edema. Normal range of motion.     Neurological: She is alert and oriented to person, place, and time. She has normal strength. No cranial nerve deficit or sensory deficit.   Finger to nose wnl    Skin: Skin is warm and dry. Capillary refill takes less than 2 seconds. No rash noted.   Psychiatric: She has a normal mood and affect.       ED Course   Procedures  Labs Reviewed   CBC W/ AUTO DIFFERENTIAL - Abnormal; Notable for the following components:       Result Value    RBC 3.12 (*)     Hemoglobin 8.9 (*)     Hematocrit 27.1 (*)     RDW 18.7 (*)     All other components within normal limits   COMPREHENSIVE METABOLIC PANEL - Abnormal; Notable for the following components:    CO2 18 (*)     Calcium 8.4 (*)     Albumin 3.1 (*)     All other components within normal limits   B-TYPE NATRIURETIC PEPTIDE - Abnormal; Notable for the following components:     (*)     All other components within normal limits   MAGNESIUM   TROPONIN I          Imaging Results              X-Ray Chest AP Portable (Final result)  Result time 04/07/23 21:19:15      Final result by Alcon Blanc DO (04/07/23 21:19:15)                   Impression:      No acute cardiopulmonary abnormality.      Electronically signed by: Alcon Blanc  Date:    04/07/2023  Time:    21:19               Narrative:    EXAMINATION:  XR CHEST AP  PORTABLE    CLINICAL HISTORY:  weakness;    TECHNIQUE:  Single frontal view of the chest was performed.    COMPARISON:  01/25/2023.    FINDINGS:  The lungs are well expanded. Coarse chronic interstitial prominence noted. No new focal consolidation. There is nonspecific elevation of the left hemidiaphragm. The pleural spaces are clear. The cardiac silhouette is enlarged. There are calcifications of the aortic arch. The visualized osseous structures demonstrate degenerative changes. There is hardware in the left proximal humerus.                                    X-Rays:   Independently Interpreted Readings:   Chest X-Ray: No acute abnormalities. Cardiomegaly present.   Medications - No data to display  Medical Decision Making:   Differential Diagnosis:   Differential Diagnosis includes, but is not limited to:  CVA/TIA, vertigo, anemia/blood loss, cardiogenic shock, arrhythmia, orthostatic hypotension, dehydration, medication side effect, vitamin deficiency, liver disease, hypothyroidism, drug intoxication/withdrawal, metabolic derangement.    ED Management:  Patient is a very pleasant nontoxic-appearing 89-year-old woman who presents with weakness and generally not feeling like herself after rounds like a near syncopal episode after patient was standing after being seated position for prolonged period of time.  Patient has no focal neuro deficits she has no apparent pain.  Vital signs are stable.  Her labs demonstrate mild dehydration.  Nursing staff had attempted to place an IV a couple of times however unsuccessful.  We discussed oral hydration versus IV fluids and patient and her daughter have elected to proceed with oral hydration.  I think this is reasonable.  Patient and daughter are comfortable with discharge at this time.  She is stable for discharge today.  We discussed indications return to the ED including for worsening symptoms or any new symptoms of concern.  Close follow-up with patient's PCP  advised.    .[facility]             ED Course as of 04/08/23 1338   Fri Apr 07, 2023 2028 EKG rate 74.  Atrial fibrillation.  No STEMI. Qtc wnl [RN]   2119 CBC auto differential(!)  No significant abnormality.    [RN]   2119 Brain natriuretic peptide(!)  Consistent with previous results [RN]   2119 Magnesium  No significant abnormality.    [RN]   2119 Troponin I  No significant abnormality.    [RN]   2119 Comprehensive metabolic panel(!)  No hyperkalmeia, mild decreased bicarb  [RN]      ED Course User Index  [RN] Con Crowley Jr., MD                   Clinical Impression:   Final diagnoses:  [R53.1] Weakness  [E86.0] Dehydration (Primary)        ED Disposition Condition    Discharge Stable          ED Prescriptions    None       Follow-up Information       Follow up With Specialties Details Why Contact Info    Rubi Young, DO Internal Medicine In 3 days  [address] [facility] [address]  [phone]              Portions of this note were dictated using voice recognition software and may contain dictation related errors in spelling/grammar/syntax not found on text review       Con Crowley Jr., MD  04/08/23 1346

## 2023-04-08 NOTE — DISCHARGE INSTRUCTIONS

## 2023-04-08 NOTE — ED NOTES
"Pt c/o "not feeling like herself" and being unsteady on her feet. Reports falling yesterday while having blood taken at another [facility] facility. Pt states she feels "off" and "slow." Denies dizziness, pain, and SOB. Pt AAOx3. Respirations even and unlabored. Skin is warm and dry. Reports hx of TIA. Bandages noted to bilateral feet r/t cellulitis. NAD noted. CB within reach.    APPEARANCE: Alert, oriented and in no acute distress.  CARDIAC: Normal rate. Pt denies chest pain.   PERIPHERAL VASCULAR: Normal cap refill. No edema. Warm to touch.    RESPIRATORY: Respirations are even and unlabored. Normal rate. Pt denies SOB. Symmetrical chest expansion bilaterally. No obvious signs of distress.  GASTRO: Abdomen soft, non-tender, no distention.  MUSC: Full ROM. No bony tenderness or soft tissue tenderness. No obvious deformity.  SKIN: Cellulitis, bruising to BUE. Skin is warm and dry.  NEURO: Madan coma scale: eyes open spontaneously-4, oriented & converses-5, obeys commands-6. No neurological abnormalities.   MENTAL STATUS: Awake, alert and aware of environment.  "

## 2023-04-10 ENCOUNTER — HOSPITAL ENCOUNTER (OUTPATIENT)
Dept: WOUND CARE | Facility: HOSPITAL | Age: 88
Discharge: HOME | End: 2023-04-10
Attending: PREVENTIVE MEDICINE
Payer: MEDICARE

## 2023-04-10 ENCOUNTER — PATIENT MESSAGE (OUTPATIENT)
Dept: ADMINISTRATIVE | Facility: OTHER | Age: 88
End: 2023-04-10
Payer: MEDICARE

## 2023-04-10 ENCOUNTER — OUTPATIENT CASE MANAGEMENT (OUTPATIENT)
Dept: ADMINISTRATIVE | Facility: OTHER | Age: 88
End: 2023-04-10
Payer: MEDICARE

## 2023-04-10 VITALS
SYSTOLIC BLOOD PRESSURE: 132 MMHG | HEIGHT: 65 IN | TEMPERATURE: 98 F | WEIGHT: 143 LBS | DIASTOLIC BLOOD PRESSURE: 63 MMHG | HEART RATE: 75 BPM | BODY MASS INDEX: 23.82 KG/M2

## 2023-04-10 DIAGNOSIS — S81.801D MULTIPLE OPEN WOUNDS OF RIGHT LOWER EXTREMITY, SUBSEQUENT ENCOUNTER: ICD-10-CM

## 2023-04-10 DIAGNOSIS — L30.4 INTERTRIGO: ICD-10-CM

## 2023-04-10 DIAGNOSIS — I87.2 VENOUS INSUFFICIENCY OF BOTH LOWER EXTREMITIES: Primary | ICD-10-CM

## 2023-04-10 PROCEDURE — 99213 OFFICE O/P EST LOW 20 MIN: CPT | Mod: HCNC

## 2023-04-10 NOTE — PROGRESS NOTES
[facility]    Subjective:       Patient ID: Jenniffer Jimenez is a 89 y.o. female.    Chief Complaint: Venous Stasis (Right leg)    Patient called for appt. Today due to concerns of increased drainage. LLE with no open areas. RLE improving. No s/s of infection. Continue POC. Orders sent to .  Review of Systems   All other systems reviewed and are negative.      Objective:     Vitals:    04/10/23 1326   BP: 132/63   Pulse: 75   Temp: 98.1 °F (36.7 °C)         Physical Exam       Altered Skin Integrity Right anterior;lower;lateral;medial;posterior Leg (Active)       Altered Skin Integrity Present on Admission - Did Patient arrive to the hospital with altered skin?:    Side: Right   Orientation: anterior;lower;lateral;medial;posterior   Location: Leg   Wound Number:    Is this injury device related?:    Primary Wound Type:    Description of Altered Skin Integrity:    Ankle-Brachial Index:    Pulses:    Removal Indication and Assessment:    Wound Outcome:    (Retired) Wound Length (cm):    (Retired) Wound Width (cm):    (Retired) Depth (cm):    Wound Description (Comments):    Removal Indications:    Wound Image   04/10/23 1356   Dressing Appearance Moist drainage 04/10/23 1356   Drainage Amount Moderate 04/10/23 1356   Drainage Characteristics/Odor Serous 04/10/23 1356   Appearance Red;Moist 04/10/23 1356   Tissue loss description Partial thickness 04/10/23 1356   Red (%), Wound Tissue Color 100 % 04/10/23 1356   Periwound Area Redness;Edematous 04/10/23 1356   Wound Edges Undefined 04/10/23 1356   Wound Length (cm) 18 cm 04/10/23 1356   Wound Width (cm) 23 cm 04/10/23 1356   Wound Depth (cm) 0.1 cm 04/10/23 1356   Wound Volume (cm^3) 41.4 cm^3 04/10/23 1356   Wound Surface Area (cm^2) 414 cm^2 04/10/23 1356   Care Cleansed with:;Antimicrobial agent;Sterile normal saline 04/10/23 1356   Dressing Applied;Non-adherent;Absorptive Pad;Tubular bandage;Other (comment)  04/10/23 1356   Periwound Care Moisturizer applied 04/10/23 1356   Compression Tubular elasticized bandage 04/10/23 1356   Dressing Change Due 04/12/23 04/10/23 1356            Altered Skin Integrity Left lower;lateral Leg (Active)       Altered Skin Integrity Present on Admission - Did Patient arrive to the hospital with altered skin?:    Side: Left   Orientation: lower;lateral   Location: Leg   Wound Number:    Is this injury device related?:    Primary Wound Type:    Description of Altered Skin Integrity:    Ankle-Brachial Index:    Pulses:    Removal Indication and Assessment:    Wound Outcome:    (Retired) Wound Length (cm):    (Retired) Wound Width (cm):    (Retired) Depth (cm):    Wound Description (Comments):    Removal Indications:    Wound Image   04/10/23 1356   Dressing Appearance Intact 04/10/23 1356   Drainage Amount None 04/10/23 1356   Appearance Pink;Dry 04/10/23 1356   Tissue loss description Not applicable 04/10/23 1356   Red (%), Wound Tissue Color 100 % 04/10/23 1356   Periwound Area Dry 04/10/23 1356   Wound Edges Rolled/closed 04/10/23 1356   Wound Length (cm) 0 cm 04/10/23 1356   Wound Width (cm) 0 cm 04/10/23 1356   Wound Depth (cm) 0 cm 04/10/23 1356   Wound Volume (cm^3) 0 cm^3 04/10/23 1356   Wound Surface Area (cm^2) 0 cm^2 04/10/23 1356   Care Cleansed with:;Soap and water 04/10/23 1356   Dressing Applied;Tubular bandage 04/10/23 1356   Periwound Care Moisturizer applied 04/10/23 1356   Compression Tubular elasticized bandage 04/10/23 1356   Dressing Change Due 04/12/23 04/10/23 1356         Assessment/Plan:         ICD-10-CM ICD-9-CM   1. Venous insufficiency of both lower extremities  I87.2 459.81   2. Multiple open wounds of right lower extremity, subsequent encounter  S81.801D V58.89     894.0   3. Intertrigo  L30.4 695.89           Tissue pathology and/or culture taken:  [] Yes [x] No   Sharp debridement performed:   [] Yes [x] No   Labs ordered this visit:   [] Yes [x] No    Imaging ordered this visit:   [] Yes [x] No           Orders Placed This Encounter   Procedures    Change dressing     Right lower leg:   Cleanse wound with: Soap and water  Lidocaine: PRN   Silver nitrate: PRN   Periwound care: betamethasone to dry scaly areas   Primary dressing: Xeroform to right lower leg wound, then apply 3 ABD's   Secondary dressing: cast padding, then tubigrip E to bilateral legs. Lotion and Tubi  to LLE   Edema control: tubigrip E to bilateral legs toe to knees, elevate legs when sitting   Frequency: three times weekly and as needed   Follow-up: Dr. Cox 4/24/23  Home Health: Please continue wound care as above three times weekly and as needed except when the patient is in clinic.  Please arrange for bathing assistance three times weekly before dressings are changed, ok to take off leg bandages and wash legs in the shower with soap and water.     Other orders: Rx for Mystatin powder and Diflucan tab sent to patient's pharmacy        Follow up in about 2 weeks (around 4/24/2023).

## 2023-04-10 NOTE — PROGRESS NOTES
Patient went to the emergency department due to dehydration on 4/7/23.  She states she was only drinking about a bottle of water (16 oz) a day.  She states she has since increased it to at least two bottles a day.  She weighs herself everyday and is weighing 155 lbs today.  She states the signs and symptoms of CHF are SOB, lightheadedness, and leg swelling.  She states she pretty much eats a low sodium diet.  She did go to [employer] and have the charbroiled oysters and a tuna sandwich from [employer].  She states otherwise she has been doing good with it.  She eats light salads from [employer].  Patient states she is still getting her dressing changed three times a week from home health nurse.  She states she is still going to the wound care clinic also.  She states the legs are looking better.  She states that one day the  nurse saw green drainage on her dressing and sent her to the wound care clinic right away.  Patient states that the wound care clinic told her it was because of her medication and to not be concerned about it.  She is no longer taking the Bob, she states the doctor stopped it from wound care.    REJI reiterated the signs and symptoms of CHF including coughing which patient left off when repeating them to REJI.  REJI also reiterated the 3 lb/5 lb rule of when to notify the MD.  REJI reiterated the signs and symptoms of a wound infection including fever above 100.4, swelling, drainage, pain or redness in or around wound, red streaks in skin around wound.  REJI discussed case closure with patient who is in agreement.  Patient requested REJI to send her contact information to her in an email at [email or web address].  CM sent phone number and email address as patient requested.  REJI informed patient to call REJI if she needs anything or has any questions.

## 2023-04-11 ENCOUNTER — OFFICE VISIT (OUTPATIENT)
Dept: ALLERGY | Facility: CLINIC | Age: 88
End: 2023-04-11
Payer: MEDICARE

## 2023-04-11 VITALS — HEIGHT: 65 IN | BODY MASS INDEX: 26 KG/M2 | WEIGHT: 156.06 LBS

## 2023-04-11 DIAGNOSIS — J30.9 ALLERGIC RHINITIS, UNSPECIFIED SEASONALITY, UNSPECIFIED TRIGGER: Primary | ICD-10-CM

## 2023-04-11 DIAGNOSIS — T78.40XD ALLERGIC REACTION TO DRUG, SUBSEQUENT ENCOUNTER: ICD-10-CM

## 2023-04-11 PROCEDURE — 99214 PR OFFICE/OUTPT VISIT, EST, LEVL IV, 30-39 MIN: ICD-10-PCS | Mod: HCNC,GC,S$GLB, | Performed by: STUDENT IN AN ORGANIZED HEALTH CARE EDUCATION/TRAINING PROGRAM

## 2023-04-11 PROCEDURE — 99214 OFFICE O/P EST MOD 30 MIN: CPT | Mod: HCNC,GC,S$GLB, | Performed by: STUDENT IN AN ORGANIZED HEALTH CARE EDUCATION/TRAINING PROGRAM

## 2023-04-11 PROCEDURE — 1101F PR PT FALLS ASSESS DOC 0-1 FALLS W/OUT INJ PAST YR: ICD-10-PCS | Mod: HCNC,CPTII,GC,S$GLB | Performed by: STUDENT IN AN ORGANIZED HEALTH CARE EDUCATION/TRAINING PROGRAM

## 2023-04-11 PROCEDURE — 99999 PR PBB SHADOW E&M-EST. PATIENT-LVL III: ICD-10-PCS | Mod: PBBFAC,HCNC,GC, | Performed by: STUDENT IN AN ORGANIZED HEALTH CARE EDUCATION/TRAINING PROGRAM

## 2023-04-11 PROCEDURE — 3288F PR FALLS RISK ASSESSMENT DOCUMENTED: ICD-10-PCS | Mod: HCNC,CPTII,GC,S$GLB | Performed by: STUDENT IN AN ORGANIZED HEALTH CARE EDUCATION/TRAINING PROGRAM

## 2023-04-11 PROCEDURE — 1101F PT FALLS ASSESS-DOCD LE1/YR: CPT | Mod: HCNC,CPTII,GC,S$GLB | Performed by: STUDENT IN AN ORGANIZED HEALTH CARE EDUCATION/TRAINING PROGRAM

## 2023-04-11 PROCEDURE — 1125F AMNT PAIN NOTED PAIN PRSNT: CPT | Mod: HCNC,CPTII,GC,S$GLB | Performed by: STUDENT IN AN ORGANIZED HEALTH CARE EDUCATION/TRAINING PROGRAM

## 2023-04-11 PROCEDURE — 1125F PR PAIN SEVERITY QUANTIFIED, PAIN PRESENT: ICD-10-PCS | Mod: HCNC,CPTII,GC,S$GLB | Performed by: STUDENT IN AN ORGANIZED HEALTH CARE EDUCATION/TRAINING PROGRAM

## 2023-04-11 PROCEDURE — 3288F FALL RISK ASSESSMENT DOCD: CPT | Mod: HCNC,CPTII,GC,S$GLB | Performed by: STUDENT IN AN ORGANIZED HEALTH CARE EDUCATION/TRAINING PROGRAM

## 2023-04-11 PROCEDURE — 99999 PR PBB SHADOW E&M-EST. PATIENT-LVL III: CPT | Mod: PBBFAC,HCNC,GC, | Performed by: STUDENT IN AN ORGANIZED HEALTH CARE EDUCATION/TRAINING PROGRAM

## 2023-04-11 NOTE — PROGRESS NOTES
ALLERGY & IMMUNOLOGY CLINIC - FOLLOW UP     HISTORY OF PRESENT ILLNESS     Patient ID: Jenniffer Jimenez is a 89 y.o. female    CC: discuss resuming allergy shots    HPI: Ms. Jenniffer Jimenez is a 89 year old female with a history of allergic rhinitis and Meniere's disease s/p aural shunt by Dr. Mabry in  who presents to clinic today for follow up. She was initially seen by Dr. Ragsdale in  and was on AIT maintenance therapy for alternaria, fusarium, D. Farinae, and D. Pteronyssinus since then has she was previously told by Dr. Mabry that she would need to be on life long AIT for treatment of Meniere's disease. She was tolerating AIT monthly, however, was hospitalized multiple times for cellulitis thus she missed her monthly shots. Since her last shot was beyond the time frame to safely continue receiving AIT, she would need to start from scratch thus reason for presenting to clinic today. Patient notes that since being off of AIT, she has not had symptoms of vertigo, nasal congestion, or rhinorrhea. She is concerned that cellulitis has worsened since being off AIT.    Also of note, patient developed a diffuse pruritic rash as she was nearing completion of a course of Cipro. This rash lasted a few days.      REVIEW OF SYSTEMS     Balance of review of systems negative except as mentioned above     MEDICAL HISTORY     MedHx: active problems reviewed  SurgHx:   Past Surgical History:   Procedure Laterality Date    APPENDECTOMY      CARDIAC CATHETERIZATION      CATARACT EXTRACTION W/  INTRAOCULAR LENS IMPLANT Bilateral      SECTION, CLASSIC      CLOSURE OF LEFT ATRIAL APPENDAGE USING DEVICE N/A 2020    Procedure: Left atrial appendage closure device;  Surgeon: Abundio Curtis MD;  Location: [facility] CATH LAB;  Service: Cardiology;  Laterality: N/A;    HYSTERECTOMY      INNER EAR SURGERY      JOINT REPLACEMENT      LEFT KNEE REPLACEMENT IN 2013 -    OOPHORECTOMY      SINUS SURGERY      STRABISMUS SURGERY   2/6/13    RSR recession 5 mm, LLR recession 5 mm and LMR resection 4mm    STRABISMUS SURGERY  03/19/2014    recess LR OD 6mm    TONSILLECTOMY      watchman surgery N/A 11/2020     Medications: MAR reviewed       PHYSICAL EXAM     Physical Exam  Constitutional:       Appearance: Normal appearance.   HENT:      Head: Normocephalic and atraumatic.      Mouth/Throat:      Mouth: Mucous membranes are moist.   Eyes:      Extraocular Movements: Extraocular movements intact.      Conjunctiva/sclera: Conjunctivae normal.   Cardiovascular:      Rate and Rhythm: Normal rate and regular rhythm.   Pulmonary:      Effort: Pulmonary effort is normal. No respiratory distress.      Breath sounds: Normal breath sounds. No wheezing.   Neurological:      Mental Status: She is alert.   Psychiatric:         Mood and Affect: Mood normal.         Behavior: Behavior normal.         Thought Content: Thought content normal.         Judgment: Judgment normal.      ALLERGEN TESTING      Skin Prick: records unavailable, remote history of prior SPT     Immunocaps: none to date     ASSESSMENT & PLAN     Jenniffer Jimenez is a 89 y.o. female with     Allergic rhinitis: On AIT maintenance therapy for alternaria, fusarium, D. Farinae, and D. Pteronyssinus since 1980's as she was told by ENT Dr. Mabry at [facility] that lifelong AIT can help treat Meniere's disease. She was tolerating AIT monthly, however, was hospitalized multiple times for cellulitis thus she missed her monthly shots. Since her last shot was beyond the time frame to safely continue receiving AIT she would need to undergo desensitization again. Discussed risks and benefits of resuming AIT. Currently symptoms are well controlled off of AIT. Thus in this patient with multiple cardiac comorbidities, it was decided together to not resume AIT at this time as risks outweigh the benefits.     Allergic reaction to drug: Per history and chart review patient developed what seems to be a  maculopapular diffuse rash during her course of ciprofloxacin treatment. This is likely a T cell mediated condition. If ciprofloxacin is needed in the future than can consider receiving first dose in allergy clinic under observation.    Follow up: as needed    Patient discussed with attending, Dr. Gloria Tang MD  Allergy/Immunology Fellow

## 2023-04-12 ENCOUNTER — OFFICE VISIT (OUTPATIENT)
Dept: CARDIOLOGY | Facility: CLINIC | Age: 88
End: 2023-04-12
Payer: MEDICARE

## 2023-04-12 VITALS
WEIGHT: 156.94 LBS | HEART RATE: 71 BPM | DIASTOLIC BLOOD PRESSURE: 52 MMHG | HEIGHT: 65 IN | BODY MASS INDEX: 26.15 KG/M2 | SYSTOLIC BLOOD PRESSURE: 119 MMHG

## 2023-04-12 DIAGNOSIS — I50.32 CHRONIC DIASTOLIC HEART FAILURE: Primary | Chronic | ICD-10-CM

## 2023-04-12 PROCEDURE — 1160F PR REVIEW ALL MEDS BY PRESCRIBER/CLIN PHARMACIST DOCUMENTED: ICD-10-PCS | Mod: CPTII,S$GLB,, | Performed by: INTERNAL MEDICINE

## 2023-04-12 PROCEDURE — 1101F PR PT FALLS ASSESS DOC 0-1 FALLS W/OUT INJ PAST YR: ICD-10-PCS | Mod: CPTII,S$GLB,, | Performed by: INTERNAL MEDICINE

## 2023-04-12 PROCEDURE — 1159F PR MEDICATION LIST DOCUMENTED IN MEDICAL RECORD: ICD-10-PCS | Mod: CPTII,S$GLB,, | Performed by: INTERNAL MEDICINE

## 2023-04-12 PROCEDURE — 99999 PR PBB SHADOW E&M-EST. PATIENT-LVL IV: ICD-10-PCS | Mod: PBBFAC,,, | Performed by: INTERNAL MEDICINE

## 2023-04-12 PROCEDURE — 3288F FALL RISK ASSESSMENT DOCD: CPT | Mod: CPTII,S$GLB,, | Performed by: INTERNAL MEDICINE

## 2023-04-12 PROCEDURE — 3288F PR FALLS RISK ASSESSMENT DOCUMENTED: ICD-10-PCS | Mod: CPTII,S$GLB,, | Performed by: INTERNAL MEDICINE

## 2023-04-12 PROCEDURE — 1101F PT FALLS ASSESS-DOCD LE1/YR: CPT | Mod: CPTII,S$GLB,, | Performed by: INTERNAL MEDICINE

## 2023-04-12 PROCEDURE — 1160F RVW MEDS BY RX/DR IN RCRD: CPT | Mod: CPTII,S$GLB,, | Performed by: INTERNAL MEDICINE

## 2023-04-12 PROCEDURE — 1126F PR PAIN SEVERITY QUANTIFIED, NO PAIN PRESENT: ICD-10-PCS | Mod: CPTII,S$GLB,, | Performed by: INTERNAL MEDICINE

## 2023-04-12 PROCEDURE — 1159F MED LIST DOCD IN RCRD: CPT | Mod: CPTII,S$GLB,, | Performed by: INTERNAL MEDICINE

## 2023-04-12 PROCEDURE — 99214 OFFICE O/P EST MOD 30 MIN: CPT | Mod: S$GLB,,, | Performed by: INTERNAL MEDICINE

## 2023-04-12 PROCEDURE — 1126F AMNT PAIN NOTED NONE PRSNT: CPT | Mod: CPTII,S$GLB,, | Performed by: INTERNAL MEDICINE

## 2023-04-12 PROCEDURE — 99999 PR PBB SHADOW E&M-EST. PATIENT-LVL IV: CPT | Mod: PBBFAC,,, | Performed by: INTERNAL MEDICINE

## 2023-04-12 PROCEDURE — 99214 PR OFFICE/OUTPT VISIT, EST, LEVL IV, 30-39 MIN: ICD-10-PCS | Mod: S$GLB,,, | Performed by: INTERNAL MEDICINE

## 2023-04-12 RX ORDER — FUROSEMIDE 20 MG/1
20 TABLET ORAL DAILY
Qty: 30 TABLET | Refills: 11 | Status: SHIPPED | OUTPATIENT
Start: 2023-04-12 | End: 2023-04-14

## 2023-04-12 NOTE — PROGRESS NOTES
Subjective:   04/12/2023     Patient ID:  Jenniffer Jimenez is a 89 y.o. female who presents for evaulation of Dizziness and ER f/u      She comes in for cardiac follow-up.  Lots of problems recently with lower extremity cellulitis.  Hospitalized for that, developed delirium.  Subsequent to our last visit, she developed dehydration was seen in the emergency room.  Her weight has increased since then from 149 lb to 155 lb.  She is not had chest pains or tightness    She has not had increasing chest pains or tightness, shortness of breath, PND or orthopnea.  She is status placement of a left atrial appendage occluder 2021, November.  Follow up in Interventional Clinic, note made that she should take SBE prophylaxis for life, she has not been doing that.    She has previous had diastolic heart failure, shortness of breath not increasing, she was on spironolactone, caused hyperkalemia.    Chronic atrial fibrillation is present, currently off of anticoagulation, only on aspirin.  No bleeding problems.        Prior note:    She presented for evaluation of chest pain in February of 2021.  She had noted increasing swelling in her legs.  She had taken additional furosemide.  She is status post Watchman device placement in December of 2020.  She is off anticoagulation, now on aspirin only.  She has not had bleeding problems.  She does not have a history of chest pain.  Cardiac catheterization performed many years ago was normal.  Echocardiography continue show evidence for diastolic dysfunction with increased PA pressures and increased left atrial size.  She has not had increasing shortness of breath.  Her weight has been stable, taking additional furosemide and aldosterone is needed.  Laboratory work recently has shown stable renal to and potassium.     Hypercholesterolemia is on present, on moderate dose statin therapy.  Prescription refilled     She does have chronic atrial fibrillation is status post Watchman device as  noted above.  Her heart rate is well controlled.     She was felt to have acute on chronic chronic diastolic heart failure.  My recommendations then were:  1.  Discontinue potassium chloride  2.  Take furosemide 20 mg twice a day to get rid of fluid, decrease to once a day when fluid improved.  3.  Take spironolactone 25 mg 1 with each furosemide.  4.  Call me if you have recurrent chest pain.  5.  Weigh yourself daily.  Record this and bring it in with your next visit     I saw her 2 weeks after the initial presentation.  She was markedly improved.  Recommendations then were:    1.  Decrease Lasix/furosemide and Aldactone/spironolactone to 1 a day as long as weight stays below 156 lb, okay to increase to twice a day for increased weight.  2.  Please do a blood test for me today to check kidney function.    She developed an episode of lightheadedness and her weight target was changed to 160lb.      Echocardiogram from January 2022:  · The left ventricle is normal in size with normal systolic function.  · The estimated ejection fraction is 65%.  · Severe left atrial enlargement.  · Grade III left ventricular diastolic dysfunction.  · The estimated PA systolic pressure is 51 mmHg.  · Normal right ventricular size with normal right ventricular systolic function.  · There is mild pulmonary hypertension.  · Intermediate central venous pressure (8 mmHg).  · Moderate right atrial enlargement.  · Moderate tricuspid regurgitation.  · Mild mitral regurgitation.       Recent carotid ultrasound shows mild bilateral disease:  There is 20-39% right Internal Carotid Stenosis.  There is 40-49% left Internal Carotid Stenosis.      Congestive Heart Failure  Pertinent negatives include no chest pain, claudication, near-syncope or palpitations.   Hypertension  Associated symptoms include headaches. Pertinent negatives include no chest pain, orthopnea, palpitations or PND.   Hyperlipidemia  Pertinent negatives include no chest pain.      Patient Active Problem List   Diagnosis    Pure hypercholesterolemia    Pseudophakia, both eyes    Stable central retinal vein occlusion of left eye    Chronic rhinitis    Hearing loss, sensorineural    Primary hypertension    Arthritis    Exotropia of both eyes    Debility    Gait instability    Meniere's disease    Facet arthropathy, lumbosacral    Lumbar spinal stenosis    Hypermetropia of right eye    Chronic atrial fibrillation    Stage 3b chronic kidney disease    Atherosclerosis of aorta    Chronic diastolic heart failure    History of TIA (transient ischemic attack)    Osteoporosis    Primary osteoarthritis of right shoulder    History of strabismus surgery    Encounter for long-term (current) use of antiplatelets/antithrombotics    Prediabetes    Refractive error    Posterior vitreous detachment, bilateral    Dry eye syndrome    Overweight (BMI 25.0-29.9)    Risk for falls    OAB (overactive bladder)    Venous insufficiency of both lower extremities    BRVO (branch retinal vein occlusion)    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 eye with active choroidal neovascularization    Hypertensive retinopathy of both eyes    Unspecified inflammatory spondylopathy, lumbosacral region    Presence of Watchman left atrial appendage closure device    Normocytic anemia    Cervicogenic headache    Diabetic polyneuropathy    Senile purpura    Skin tear of forearm without complication, left, initial encounter    Tear of left supraspinatus tendon    Weakness of shoulder    Impaired function of upper extremity    Normocytic normochromic anemia    Multiple open wounds of right lower extremity    Unable to care for self    Hyperkalemia    Trochanteric bursitis of right hip    Intertrigo    Generalized skin eruption due to medication taken internally    High anion gap metabolic acidosis    Delirium due to medical condition with behavioral disturbance    Pulmonary hypertension    History of diabetes mellitus     "Polyneuropathy in diseases classified elsewhere        Review of patient's allergies indicates:   Allergen Reactions    Opioids - morphine analogues Other (See Comments)     Bowel issues; bowel obstruction    Tizanidine Other (See Comments)     "Lips were numb,  Almost passed out."    Tramadol Hallucinations    Beta-blockers (beta-adrenergic blocking agts) Other (See Comments)     Can not go on beta blockers for long period of time - due to taking allergy injections    Morphine     Opioids-meperidine and related     Ciprofloxacin Rash         Current Outpatient Medications:     acetaminophen (TYLENOL) 500 MG tablet, Take 1,000 mg by mouth 2 (two) times a day., Disp: , Rfl:     aspirin (ECOTRIN) 81 MG EC tablet, Take 1 tablet (81 mg total) by mouth once daily., Disp: 90 tablet, Rfl: 3    diclofenac sodium (VOLTAREN) 1 % Gel, Apply 2 g topically 4 (four) times daily. Use gloves to apply to right hip for 10 days, Disp: 100 g, Rfl: 1    miconazole NITRATE 2 % (MICOTIN) 2 % top powder, Apply topically 2 (two) times daily. To RLE., Disp: , Rfl: 0    nystatin (MYCOSTATIN) powder, Apply topically 2 (two) times daily., Disp: 30 g, Rfl: 1    pravastatin (PRAVACHOL) 40 MG tablet, Take 1 tablet (40 mg total) by mouth once daily., Disp: 90 tablet, Rfl: 3    silver sulfADIAZINE 1% (SILVADENE) 1 % cream, Apply to RLE skin breakdown twice daily, Disp: , Rfl:     triamcinolone acetonide 0.5% (KENALOG) 0.5 % Crea, Apply to legs with dressing changes., Disp: 454 g, Rfl: 0    vit C/E/Zn/coppr/lutein/zeaxan (OCUVITE LUTEIN AND ZEAXANTHIN ORAL), Take 1 capsule by mouth once daily. Lutien 25 mg, Zeaxanthin 5 mg, Disp: , Rfl:     vitamin E 400 UNIT capsule, Take 400 Units by mouth once daily., Disp: , Rfl:     furosemide (LASIX) 20 MG tablet, Take 1 tablet (20 mg total) by mouth once daily. Hold for weights 150 lb or less, Disp: 30 tablet, Rfl: 11    QUEtiapine (SEROQUEL) 25 MG Tab, Take 1 tablet (25 mg total) by mouth every evening. " (Patient not taking: Reported on 4/12/2023), Disp: 30 tablet, Rfl: 11    sodium zirconium cyclosilicate (LOKELMA) 5 gram packet, Take 1 packet (5 g total) by mouth 2 (two) times a day. Mix entire contents of packet(s) into drinking glass containing 3 tablespoons of water; stir well and drink immediately. Add water and repeat until no powder remains to receive entire dose., Disp: 60 packet, Rfl: 3     Objective:   Review of Systems   Cardiovascular:  Negative for chest pain, claudication, cyanosis, dyspnea on exertion, irregular heartbeat, leg swelling, near-syncope, orthopnea, palpitations, paroxysmal nocturnal dyspnea and syncope.   Musculoskeletal:  Positive for arthritis and muscle cramps.   Gastrointestinal:  Positive for constipation and diarrhea.   Neurological:  Positive for headaches, loss of balance, numbness and paresthesias.     Vitals:    04/12/23 1303   BP: (!) 119/52   Pulse: 71         Physical Exam  Vitals reviewed.   Constitutional:       General: She is not in acute distress.     Appearance: She is well-developed.   HENT:      Head: Normocephalic and atraumatic.      Nose: Nose normal.   Eyes:      Conjunctiva/sclera: Conjunctivae normal.      Pupils: Pupils are equal, round, and reactive to light.   Neck:      Vascular: No carotid bruit or JVD.   Cardiovascular:      Rate and Rhythm: Normal rate and regular rhythm.      Pulses: Intact distal pulses.      Heart sounds: Normal heart sounds. No murmur heard.    No friction rub. No gallop.      Comments: Jugular venous pressure is normal.  No edema  Pulmonary:      Effort: Pulmonary effort is normal. No respiratory distress.      Breath sounds: Normal breath sounds. No wheezing or rales.   Chest:      Chest wall: No tenderness.   Abdominal:      General: Bowel sounds are normal. There is no distension.      Palpations: Abdomen is soft.      Tenderness: There is no abdominal tenderness.   Musculoskeletal:         General: No tenderness or deformity.  Normal range of motion.      Cervical back: Normal range of motion and neck supple.      Right lower leg: No edema (None).      Left lower leg: No edema.      Comments: Both lower extremities below the knee wrapped   Skin:     General: Skin is warm and dry.      Findings: No erythema or rash.   Neurological:      Mental Status: She is alert and oriented to person, place, and time.      Cranial Nerves: No cranial nerve deficit.      Motor: No abnormal muscle tone.      Coordination: Coordination normal.   Psychiatric:         Behavior: Behavior normal.         Thought Content: Thought content normal.         Judgment: Judgment normal.        Assessment and Plan:     Chronic diastolic heart failure  -     Basic Metabolic Panel; Future; Expected date: 04/26/2023  -     furosemide (LASIX) 20 MG tablet; Take 1 tablet (20 mg total) by mouth once daily. Hold for weights 150 lb or less  Dispense: 30 tablet; Refill: 11         Patient with lightheadedness and weakness when weight was less than 150, will hold furosemide for weights 150 or less.  Otherwise, she appears to be doing well.  She did have hyperkalemia while on spironolactone, will recheck her potassium in 2 weeks.  If potassium would be low, would consider resumption of low-dose spironolactone.  No follow-ups on file.

## 2023-04-12 NOTE — PATIENT INSTRUCTIONS
Weigh yourself daily and record the weights    Take furosemide daily, but do not take it if your weight is 150 lb or less    Blood work in 2 weeks

## 2023-04-14 DIAGNOSIS — I50.32 CHRONIC DIASTOLIC HEART FAILURE: Chronic | ICD-10-CM

## 2023-04-14 PROCEDURE — G0179 PR HOME HEALTH MD RECERTIFICATION: ICD-10-PCS | Mod: ,,, | Performed by: INTERNAL MEDICINE

## 2023-04-14 PROCEDURE — G0179 MD RECERTIFICATION HHA PT: HCPCS | Mod: ,,, | Performed by: INTERNAL MEDICINE

## 2023-04-14 RX ORDER — FUROSEMIDE 20 MG/1
20 TABLET ORAL DAILY
Qty: 30 TABLET | Refills: 11 | Status: ON HOLD | OUTPATIENT
Start: 2023-04-14 | End: 2023-04-27 | Stop reason: HOSPADM

## 2023-04-14 NOTE — TELEPHONE ENCOUNTER
----- Message from Sabrina Chapman MA sent at 4/14/2023  9:50 AM CDT -----  Regarding: FW: wt gain    ----- Message -----  From: Jeri Recinos  Sent: 4/14/2023   9:49 AM CDT  To: [facility] Staff  Subject: wt gain                                          Pls call Juliette with [facility] [phone].  She is calling to report a 6 lb wt gain within a week.  She is with the patient at this time.    Thank you

## 2023-04-14 NOTE — TELEPHONE ENCOUNTER
Patient with increased weight.  Furosemide dose changed, she is to take 20 mg 1 a day, hold for weights less than 150, take 2 a day in the morning, for weights 150 over

## 2023-04-17 ENCOUNTER — TELEPHONE (OUTPATIENT)
Dept: OPHTHALMOLOGY | Facility: CLINIC | Age: 88
End: 2023-04-17
Payer: MEDICARE

## 2023-04-17 NOTE — TELEPHONE ENCOUNTER
----- Message from Remi Mitchell sent at 4/17/2023  8:06 AM CDT -----  Contact: [phone]  Pt is calling because she has been waking up in the morning with her eye's crusted over and leaking from the eye's. She states that her eye's are very irritated and red. Please call back to further assist.

## 2023-04-20 ENCOUNTER — TELEPHONE (OUTPATIENT)
Dept: PRIMARY CARE CLINIC | Facility: CLINIC | Age: 88
End: 2023-04-20
Payer: MEDICARE

## 2023-04-20 ENCOUNTER — DOCUMENT SCAN (OUTPATIENT)
Dept: HOME HEALTH SERVICES | Facility: HOSPITAL | Age: 88
End: 2023-04-20
Payer: MEDICARE

## 2023-04-20 NOTE — TELEPHONE ENCOUNTER
Spoke with pt about scheduling appt with Dr. Fagan. Pt's appt slot is held until someone in our clinic can schedule for pt

## 2023-04-20 NOTE — TELEPHONE ENCOUNTER
----- Message from Janice Jackson sent at 4/20/2023  8:27 AM CDT -----  Contact: [phone] - pt  Ms Abad would like to transfer care from Dr Young to establish care with Dr Fagan       Not seeing anything come up in Epic, please call and advise @ [phone]

## 2023-04-20 NOTE — TELEPHONE ENCOUNTER
----- Message from Yuval Jordan sent at 4/20/2023 11:59 AM CDT -----  Contact: Pt [phone]  Per Dr. Hernandez, Can you help schedule appt for pt? Est care - 4/25 at 11 AM with Dr. Fagan. Thanks!. See, chart notes. The computer won't give me any dates.    Thank you

## 2023-04-21 ENCOUNTER — HOSPITAL ENCOUNTER (INPATIENT)
Facility: HOSPITAL | Age: 88
LOS: 3 days | Discharge: NURSING, REHAB OR LONG-TERM CARE | DRG: 291 | End: 2023-04-27
Attending: EMERGENCY MEDICINE | Admitting: STUDENT IN AN ORGANIZED HEALTH CARE EDUCATION/TRAINING PROGRAM
Payer: MEDICARE

## 2023-04-21 DIAGNOSIS — I50.30 HEART FAILURE WITH PRESERVED EJECTION FRACTION, UNSPECIFIED HF CHRONICITY: ICD-10-CM

## 2023-04-21 DIAGNOSIS — A41.9 SEPSIS: ICD-10-CM

## 2023-04-21 DIAGNOSIS — I48.20 CHRONIC A-FIB: Primary | ICD-10-CM

## 2023-04-21 DIAGNOSIS — I50.9 ACUTE DECOMPENSATED HEART FAILURE: ICD-10-CM

## 2023-04-21 DIAGNOSIS — R06.02 SOB (SHORTNESS OF BREATH): ICD-10-CM

## 2023-04-21 LAB
ALBUMIN SERPL BCP-MCNC: 3.1 G/DL (ref 3.5–5.2)
ALP SERPL-CCNC: 120 U/L (ref 55–135)
ALT SERPL W/O P-5'-P-CCNC: 13 U/L (ref 10–44)
ANION GAP SERPL CALC-SCNC: 14 MMOL/L (ref 8–16)
AST SERPL-CCNC: 18 U/L (ref 10–40)
BACTERIA #/AREA URNS HPF: ABNORMAL /HPF
BASOPHILS # BLD AUTO: 0.02 K/UL (ref 0–0.2)
BASOPHILS NFR BLD: 0.4 % (ref 0–1.9)
BILIRUB SERPL-MCNC: 0.3 MG/DL (ref 0.1–1)
BILIRUB UR QL STRIP: NEGATIVE
BNP SERPL-MCNC: 160 PG/ML (ref 0–99)
BUN SERPL-MCNC: 24 MG/DL (ref 10–30)
CALCIUM SERPL-MCNC: 8.4 MG/DL (ref 8.7–10.5)
CHLORIDE SERPL-SCNC: 101 MMOL/L (ref 95–110)
CLARITY UR: CLEAR
CO2 SERPL-SCNC: 22 MMOL/L (ref 23–29)
COLOR UR: YELLOW
CREAT SERPL-MCNC: 1.1 MG/DL (ref 0.5–1.4)
DIFFERENTIAL METHOD: ABNORMAL
EOSINOPHIL # BLD AUTO: 0 K/UL (ref 0–0.5)
EOSINOPHIL NFR BLD: 0.4 % (ref 0–8)
ERYTHROCYTE [DISTWIDTH] IN BLOOD BY AUTOMATED COUNT: 18.1 % (ref 11.5–14.5)
EST. GFR  (NO RACE VARIABLE): 47 ML/MIN/1.73 M^2
GLUCOSE SERPL-MCNC: 88 MG/DL (ref 70–110)
GLUCOSE UR QL STRIP: NEGATIVE
HCO3 UR-SCNC: 26.5 MMOL/L (ref 24–28)
HCT VFR BLD AUTO: 30.5 % (ref 37–48.5)
HGB BLD-MCNC: 9.8 G/DL (ref 12–16)
HGB UR QL STRIP: NEGATIVE
HYALINE CASTS #/AREA URNS LPF: 2 /LPF
IMM GRANULOCYTES # BLD AUTO: 0.03 K/UL (ref 0–0.04)
IMM GRANULOCYTES NFR BLD AUTO: 0.6 % (ref 0–0.5)
KETONES UR QL STRIP: NEGATIVE
LACTATE SERPL-SCNC: 1.5 MMOL/L (ref 0.5–2.2)
LACTATE SERPL-SCNC: 2.2 MMOL/L (ref 0.5–2.2)
LEUKOCYTE ESTERASE UR QL STRIP: NEGATIVE
LYMPHOCYTES # BLD AUTO: 0.5 K/UL (ref 1–4.8)
LYMPHOCYTES NFR BLD: 10.3 % (ref 18–48)
MAGNESIUM SERPL-MCNC: 2 MG/DL (ref 1.6–2.6)
MCH RBC QN AUTO: 27.5 PG (ref 27–31)
MCHC RBC AUTO-ENTMCNC: 32.1 G/DL (ref 32–36)
MCV RBC AUTO: 85 FL (ref 82–98)
MICROSCOPIC COMMENT: ABNORMAL
MONOCYTES # BLD AUTO: 0.4 K/UL (ref 0.3–1)
MONOCYTES NFR BLD: 7.9 % (ref 4–15)
NEUTROPHILS # BLD AUTO: 3.7 K/UL (ref 1.8–7.7)
NEUTROPHILS NFR BLD: 80.4 % (ref 38–73)
NITRITE UR QL STRIP: NEGATIVE
NRBC BLD-RTO: 0 /100 WBC
PCO2 BLDA: 40.3 MMHG (ref 35–45)
PH SMN: 7.43 [PH] (ref 7.35–7.45)
PH UR STRIP: 6 [PH] (ref 5–8)
PLATELET # BLD AUTO: 219 K/UL (ref 150–450)
PMV BLD AUTO: 10.3 FL (ref 9.2–12.9)
PO2 BLDA: 16 MMHG (ref 40–60)
POC BE: 2 MMOL/L
POC SATURATED O2: 24 % (ref 95–100)
POC TCO2: 28 MMOL/L (ref 24–29)
POCT GLUCOSE: 148 MG/DL (ref 70–110)
POCT GLUCOSE: 175 MG/DL (ref 70–110)
POTASSIUM SERPL-SCNC: 5 MMOL/L (ref 3.5–5.1)
PROCALCITONIN SERPL IA-MCNC: 0.17 NG/ML
PROT SERPL-MCNC: 7.1 G/DL (ref 6–8.4)
PROT UR QL STRIP: ABNORMAL
RBC # BLD AUTO: 3.57 M/UL (ref 4–5.4)
RBC #/AREA URNS HPF: 0 /HPF (ref 0–4)
SAMPLE: ABNORMAL
SODIUM SERPL-SCNC: 137 MMOL/L (ref 136–145)
SP GR UR STRIP: 1.02 (ref 1–1.03)
SQUAMOUS #/AREA URNS HPF: 0 /HPF
TROPONIN I SERPL DL<=0.01 NG/ML-MCNC: <0.006 NG/ML (ref 0–0.03)
URN SPEC COLLECT METH UR: ABNORMAL
UROBILINOGEN UR STRIP-ACNC: NEGATIVE EU/DL
WBC # BLD AUTO: 4.66 K/UL (ref 3.9–12.7)
WBC #/AREA URNS HPF: 0 /HPF (ref 0–5)

## 2023-04-21 PROCEDURE — G0378 HOSPITAL OBSERVATION PER HR: HCPCS | Mod: HCNC

## 2023-04-21 PROCEDURE — 82962 GLUCOSE BLOOD TEST: CPT | Mod: HCNC

## 2023-04-21 PROCEDURE — 83880 ASSAY OF NATRIURETIC PEPTIDE: CPT | Mod: HCNC | Performed by: EMERGENCY MEDICINE

## 2023-04-21 PROCEDURE — 63700000 PHARM REV CODE 250 ALT 637 W/O HCPCS: Mod: HCNC | Performed by: NURSE PRACTITIONER

## 2023-04-21 PROCEDURE — 99900035 HC TECH TIME PER 15 MIN (STAT): Mod: HCNC

## 2023-04-21 PROCEDURE — 63600175 PHARM REV CODE 636 W HCPCS: Mod: HCNC | Performed by: NURSE PRACTITIONER

## 2023-04-21 PROCEDURE — 84484 ASSAY OF TROPONIN QUANT: CPT | Mod: HCNC | Performed by: EMERGENCY MEDICINE

## 2023-04-21 PROCEDURE — 93010 EKG 12-LEAD: ICD-10-PCS | Mod: HCNC,,, | Performed by: INTERNAL MEDICINE

## 2023-04-21 PROCEDURE — 25000242 PHARM REV CODE 250 ALT 637 W/ HCPCS: Mod: HCNC | Performed by: EMERGENCY MEDICINE

## 2023-04-21 PROCEDURE — 87040 BLOOD CULTURE FOR BACTERIA: CPT | Mod: HCNC | Performed by: EMERGENCY MEDICINE

## 2023-04-21 PROCEDURE — 83735 ASSAY OF MAGNESIUM: CPT | Mod: HCNC | Performed by: EMERGENCY MEDICINE

## 2023-04-21 PROCEDURE — 25000003 PHARM REV CODE 250: Mod: HCNC | Performed by: NURSE PRACTITIONER

## 2023-04-21 PROCEDURE — 85025 COMPLETE CBC W/AUTO DIFF WBC: CPT | Mod: HCNC | Performed by: EMERGENCY MEDICINE

## 2023-04-21 PROCEDURE — 27000221 HC OXYGEN, UP TO 24 HOURS: Mod: HCNC

## 2023-04-21 PROCEDURE — 84145 PROCALCITONIN (PCT): CPT | Mod: HCNC | Performed by: EMERGENCY MEDICINE

## 2023-04-21 PROCEDURE — 96375 TX/PRO/DX INJ NEW DRUG ADDON: CPT | Mod: HCNC

## 2023-04-21 PROCEDURE — 25000242 PHARM REV CODE 250 ALT 637 W/ HCPCS: Mod: HCNC | Performed by: NURSE PRACTITIONER

## 2023-04-21 PROCEDURE — 80053 COMPREHEN METABOLIC PANEL: CPT | Mod: HCNC | Performed by: EMERGENCY MEDICINE

## 2023-04-21 PROCEDURE — 94640 AIRWAY INHALATION TREATMENT: CPT | Mod: HCNC

## 2023-04-21 PROCEDURE — 63600175 PHARM REV CODE 636 W HCPCS: Mod: HCNC | Performed by: EMERGENCY MEDICINE

## 2023-04-21 PROCEDURE — 96367 TX/PROPH/DG ADDL SEQ IV INF: CPT | Mod: HCNC

## 2023-04-21 PROCEDURE — 93005 ELECTROCARDIOGRAM TRACING: CPT | Mod: HCNC

## 2023-04-21 PROCEDURE — 96361 HYDRATE IV INFUSION ADD-ON: CPT | Mod: HCNC

## 2023-04-21 PROCEDURE — 81000 URINALYSIS NONAUTO W/SCOPE: CPT | Mod: HCNC | Performed by: EMERGENCY MEDICINE

## 2023-04-21 PROCEDURE — 25000003 PHARM REV CODE 250: Mod: HCNC | Performed by: EMERGENCY MEDICINE

## 2023-04-21 PROCEDURE — 96372 THER/PROPH/DIAG INJ SC/IM: CPT | Performed by: NURSE PRACTITIONER

## 2023-04-21 PROCEDURE — 94640 AIRWAY INHALATION TREATMENT: CPT | Mod: HCNC,XB

## 2023-04-21 PROCEDURE — 83605 ASSAY OF LACTIC ACID: CPT | Mod: 91,HCNC | Performed by: EMERGENCY MEDICINE

## 2023-04-21 PROCEDURE — 82803 BLOOD GASES ANY COMBINATION: CPT | Mod: HCNC

## 2023-04-21 PROCEDURE — 99285 EMERGENCY DEPT VISIT HI MDM: CPT | Mod: 25,HCNC

## 2023-04-21 PROCEDURE — 93010 ELECTROCARDIOGRAM REPORT: CPT | Mod: HCNC,,, | Performed by: INTERNAL MEDICINE

## 2023-04-21 PROCEDURE — 94761 N-INVAS EAR/PLS OXIMETRY MLT: CPT | Mod: HCNC

## 2023-04-21 PROCEDURE — 96365 THER/PROPH/DIAG IV INF INIT: CPT | Mod: HCNC

## 2023-04-21 RX ORDER — DILTIAZEM HYDROCHLORIDE 5 MG/ML
10 INJECTION INTRAVENOUS
Status: COMPLETED | OUTPATIENT
Start: 2023-04-21 | End: 2023-04-21

## 2023-04-21 RX ORDER — ACETAMINOPHEN 500 MG
1000 TABLET ORAL EVERY 8 HOURS PRN
Status: DISCONTINUED | OUTPATIENT
Start: 2023-04-21 | End: 2023-04-27 | Stop reason: HOSPADM

## 2023-04-21 RX ORDER — FUROSEMIDE 20 MG/1
20 TABLET ORAL DAILY
Status: DISCONTINUED | OUTPATIENT
Start: 2023-04-22 | End: 2023-04-23

## 2023-04-21 RX ORDER — FLUCONAZOLE 200 MG/1
200 TABLET ORAL DAILY
Status: DISCONTINUED | OUTPATIENT
Start: 2023-04-21 | End: 2023-04-24

## 2023-04-21 RX ORDER — INSULIN ASPART 100 [IU]/ML
0-5 INJECTION, SOLUTION INTRAVENOUS; SUBCUTANEOUS
Status: DISCONTINUED | OUTPATIENT
Start: 2023-04-21 | End: 2023-04-27 | Stop reason: HOSPADM

## 2023-04-21 RX ORDER — NAPROXEN SODIUM 220 MG/1
81 TABLET, FILM COATED ORAL DAILY
Status: DISCONTINUED | OUTPATIENT
Start: 2023-04-22 | End: 2023-04-27 | Stop reason: HOSPADM

## 2023-04-21 RX ORDER — IPRATROPIUM BROMIDE AND ALBUTEROL SULFATE 2.5; .5 MG/3ML; MG/3ML
3 SOLUTION RESPIRATORY (INHALATION) EVERY 6 HOURS PRN
Status: DISCONTINUED | OUTPATIENT
Start: 2023-04-21 | End: 2023-04-27 | Stop reason: HOSPADM

## 2023-04-21 RX ORDER — IPRATROPIUM BROMIDE AND ALBUTEROL SULFATE 2.5; .5 MG/3ML; MG/3ML
3 SOLUTION RESPIRATORY (INHALATION)
Status: COMPLETED | OUTPATIENT
Start: 2023-04-21 | End: 2023-04-21

## 2023-04-21 RX ORDER — DICLOFENAC SODIUM 10 MG/G
2 GEL TOPICAL 4 TIMES DAILY
COMMUNITY

## 2023-04-21 RX ORDER — ONDANSETRON 2 MG/ML
4 INJECTION INTRAMUSCULAR; INTRAVENOUS EVERY 8 HOURS PRN
Status: DISCONTINUED | OUTPATIENT
Start: 2023-04-21 | End: 2023-04-27 | Stop reason: HOSPADM

## 2023-04-21 RX ORDER — KETOROLAC TROMETHAMINE 10 MG/1
10 TABLET, FILM COATED ORAL EVERY 6 HOURS PRN
Status: DISCONTINUED | OUTPATIENT
Start: 2023-04-21 | End: 2023-04-21

## 2023-04-21 RX ORDER — ACETAMINOPHEN 325 MG/1
650 TABLET ORAL EVERY 8 HOURS PRN
Status: DISCONTINUED | OUTPATIENT
Start: 2023-04-21 | End: 2023-04-27 | Stop reason: HOSPADM

## 2023-04-21 RX ORDER — DEXTROSE 40 %
30 GEL (GRAM) ORAL
Status: DISCONTINUED | OUTPATIENT
Start: 2023-04-21 | End: 2023-04-27 | Stop reason: HOSPADM

## 2023-04-21 RX ORDER — ACETAMINOPHEN 500 MG
1000 TABLET ORAL
Status: COMPLETED | OUTPATIENT
Start: 2023-04-21 | End: 2023-04-21

## 2023-04-21 RX ORDER — ACETAMINOPHEN 325 MG/1
325 TABLET ORAL ONCE
Status: COMPLETED | OUTPATIENT
Start: 2023-04-21 | End: 2023-04-21

## 2023-04-21 RX ORDER — VANCOMYCIN HCL IN 5 % DEXTROSE 1G/250ML
1000 PLASTIC BAG, INJECTION (ML) INTRAVENOUS
Status: DISCONTINUED | OUTPATIENT
Start: 2023-04-22 | End: 2023-04-22 | Stop reason: SDUPTHER

## 2023-04-21 RX ORDER — GLUCAGON 1 MG
1 KIT INJECTION
Status: DISCONTINUED | OUTPATIENT
Start: 2023-04-21 | End: 2023-04-27 | Stop reason: HOSPADM

## 2023-04-21 RX ORDER — NALOXONE HCL 0.4 MG/ML
0.02 VIAL (ML) INJECTION
Status: DISCONTINUED | OUTPATIENT
Start: 2023-04-21 | End: 2023-04-27 | Stop reason: HOSPADM

## 2023-04-21 RX ORDER — SODIUM CHLORIDE 0.9 % (FLUSH) 0.9 %
10 SYRINGE (ML) INJECTION EVERY 12 HOURS PRN
Status: DISCONTINUED | OUTPATIENT
Start: 2023-04-21 | End: 2023-04-27 | Stop reason: HOSPADM

## 2023-04-21 RX ORDER — MICONAZOLE NITRATE 2 %
POWDER (GRAM) TOPICAL 2 TIMES DAILY
Status: DISCONTINUED | OUTPATIENT
Start: 2023-04-21 | End: 2023-04-24

## 2023-04-21 RX ORDER — ENOXAPARIN SODIUM 100 MG/ML
40 INJECTION SUBCUTANEOUS EVERY 24 HOURS
Status: DISCONTINUED | OUTPATIENT
Start: 2023-04-21 | End: 2023-04-23

## 2023-04-21 RX ORDER — DEXTROSE 40 %
15 GEL (GRAM) ORAL
Status: DISCONTINUED | OUTPATIENT
Start: 2023-04-21 | End: 2023-04-27 | Stop reason: HOSPADM

## 2023-04-21 RX ADMIN — IPRATROPIUM BROMIDE AND ALBUTEROL SULFATE 3 ML: .5; 2.5 SOLUTION RESPIRATORY (INHALATION) at 11:04

## 2023-04-21 RX ADMIN — PIPERACILLIN AND TAZOBACTAM 4.5 G: 4; .5 INJECTION, POWDER, LYOPHILIZED, FOR SOLUTION INTRAVENOUS; PARENTERAL at 03:04

## 2023-04-21 RX ADMIN — VANCOMYCIN HYDROCHLORIDE 1500 MG: 1.5 INJECTION, POWDER, LYOPHILIZED, FOR SOLUTION INTRAVENOUS at 05:04

## 2023-04-21 RX ADMIN — ACETAMINOPHEN 325 MG: 325 TABLET ORAL at 10:04

## 2023-04-21 RX ADMIN — ENOXAPARIN SODIUM 40 MG: 40 INJECTION SUBCUTANEOUS at 08:04

## 2023-04-21 RX ADMIN — IPRATROPIUM BROMIDE AND ALBUTEROL SULFATE 3 ML: .5; 3 SOLUTION RESPIRATORY (INHALATION) at 06:04

## 2023-04-21 RX ADMIN — SODIUM CHLORIDE, POTASSIUM CHLORIDE, SODIUM LACTATE AND CALCIUM CHLORIDE 1710 ML: 600; 310; 30; 20 INJECTION, SOLUTION INTRAVENOUS at 11:04

## 2023-04-21 RX ADMIN — DILTIAZEM HYDROCHLORIDE 10 MG: 5 INJECTION INTRAVENOUS at 01:04

## 2023-04-21 RX ADMIN — ACETAMINOPHEN 650 MG: 325 TABLET ORAL at 08:04

## 2023-04-21 RX ADMIN — ACETAMINOPHEN 1000 MG: 500 TABLET ORAL at 11:04

## 2023-04-21 RX ADMIN — FLUCONAZOLE 200 MG: 200 TABLET ORAL at 05:04

## 2023-04-21 NOTE — SUBJECTIVE & OBJECTIVE
"Past Medical History:   Diagnosis Date    Amblyopia of left eye 04/10/2013    Arthritis     Facet arthropathy, Lumbosacral    Atherosclerosis of aorta     Cataract     Central retinal vein occlusion of left eye     CKD (chronic kidney disease) stage 3, GFR 30-59 ml/min     Diabetes mellitus, type 2     Diabetic polyneuropathy 2022    Essential (primary) hypertension     Exotropia of both eyes 2013    recession RSR 5.0mm w/ adj; recession LR os 5.0 w/ adj; resect MR os  4.0mm    Hearing loss     History of resection of small bowel     Hypertensive retinopathy of both eyes     Hypoglycemia     Macular degeneration     OA (osteoarthritis) of shoulder     Right    Osteoporosis     Paroxysmal atrial fibrillation     Posterior vitreous detachment of both eyes     Psychiatric problem     Rhinitis     TIA (transient ischemic attack)        Past Surgical History:   Procedure Laterality Date    APPENDECTOMY      CARDIAC CATHETERIZATION      CATARACT EXTRACTION W/  INTRAOCULAR LENS IMPLANT Bilateral      SECTION, CLASSIC      CLOSURE OF LEFT ATRIAL APPENDAGE USING DEVICE N/A 2020    Procedure: Left atrial appendage closure device;  Surgeon: Abundio Curtis MD;  Location: [facility] CATH LAB;  Service: Cardiology;  Laterality: N/A;    HYSTERECTOMY      INNER EAR SURGERY      JOINT REPLACEMENT      LEFT KNEE REPLACEMENT IN  -    OOPHORECTOMY      SINUS SURGERY      STRABISMUS SURGERY  13    RSR recession 5 mm, LLR recession 5 mm and LMR resection 4mm    STRABISMUS SURGERY  2014    recess LR OD 6mm    TONSILLECTOMY      watchman surgery N/A 2020       Review of patient's allergies indicates:   Allergen Reactions    Opioids - morphine analogues Other (See Comments)     Bowel issues; bowel obstruction    Tizanidine Other (See Comments)     "Lips were numb,  Almost passed out."    Tramadol Hallucinations    Beta-blockers (beta-adrenergic blocking agts) Other (See Comments)     Can not go on beta " blockers for long period of time - due to taking allergy injections    Morphine     Opioids-meperidine and related     Ciprofloxacin Rash       No current facility-administered medications on file prior to encounter.     Current Outpatient Medications on File Prior to Encounter   Medication Sig    acetaminophen (TYLENOL) 500 MG tablet Take 1,000 mg by mouth 2 (two) times a day.    aspirin (ECOTRIN) 81 MG EC tablet Take 1 tablet (81 mg total) by mouth once daily.    diclofenac sodium (VOLTAREN) 1 % Gel Apply 2 g topically 4 (four) times daily. Use gloves to apply to right hip for 10 days    furosemide (LASIX) 20 MG tablet Take 1 tablet (20 mg total) by mouth once daily. Hold for weights 150 lb or less, if weight is 155 or over, take 2 tablets in the morning    miconazole NITRATE 2 % (MICOTIN) 2 % top powder Apply topically 2 (two) times daily. To RLE.    nystatin (MYCOSTATIN) powder Apply topically 2 (two) times daily.    pravastatin (PRAVACHOL) 40 MG tablet Take 1 tablet (40 mg total) by mouth once daily.    QUEtiapine (SEROQUEL) 25 MG Tab Take 1 tablet (25 mg total) by mouth every evening. (Patient not taking: Reported on 4/12/2023)    silver sulfADIAZINE 1% (SILVADENE) 1 % cream Apply to RLE skin breakdown twice daily    sodium zirconium cyclosilicate (LOKELMA) 5 gram packet Take 1 packet (5 g total) by mouth 2 (two) times a day. Mix entire contents of packet(s) into drinking glass containing 3 tablespoons of water; stir well and drink immediately. Add water and repeat until no powder remains to receive entire dose.    triamcinolone acetonide 0.5% (KENALOG) 0.5 % Crea Apply to legs with dressing changes.    vit C/E/Zn/coppr/lutein/zeaxan (OCUVITE LUTEIN AND ZEAXANTHIN ORAL) Take 1 capsule by mouth once daily. Lutien 25 mg, Zeaxanthin 5 mg    vitamin E 400 UNIT capsule Take 400 Units by mouth once daily.     Family History       Problem Relation (Age of Onset)    Breast cancer Maternal Aunt    Diabetes Sister,  Brother    Heart disease Sister, Sister    Hypertension Mother, Father    Liver disease Sister    No Known Problems Maternal Uncle, Paternal Aunt, Paternal Uncle, Maternal Grandmother, Maternal Grandfather, Paternal Grandmother, Paternal Grandfather, Daughter, Son, Son, Son          Tobacco Use    Smoking status: Former     Types: Cigarettes     Quit date: 10/29/1982     Years since quittin.5     Passive exposure: Never    Smokeless tobacco: Never   Substance and Sexual Activity    Alcohol use: Not Currently     Alcohol/week: 2.0 standard drinks     Types: 2 Shots of liquor per week     Comment: rare    Drug use: No    Sexual activity: Never     Review of Systems  Objective:     Vital Signs (Most Recent):  Temp: 98.1 °F (36.7 °C) (23 1526)  Pulse: 105 (23 1540)  Resp: (!) 27 (23 1433)  BP: (!) 98/53 (23 1542)  SpO2: 96 % (23 1540)   Vital Signs (24h Range):  Temp:  [98.1 °F (36.7 °C)-101.8 °F (38.8 °C)] 98.1 °F (36.7 °C)  Pulse:  [102-111] 105  Resp:  [22-27] 27  SpO2:  [92 %-100 %] 96 %  BP: ()/(53-69) 98/53     Weight: 70.8 kg (156 lb)  Body mass index is 25.96 kg/m².    Physical Exam  Constitutional:       Appearance: She is ill-appearing.   HENT:      Head: Normocephalic and atraumatic.      Mouth/Throat:      Pharynx: Oropharynx is clear.   Eyes:      Conjunctiva/sclera: Conjunctivae normal.   Cardiovascular:      Rate and Rhythm: Tachycardia present. Rhythm irregular.      Pulses: Normal pulses.      Heart sounds: Normal heart sounds.   Pulmonary:      Effort: Pulmonary effort is normal.      Comments: Mildly coarse   Abdominal:      General: Abdomen is flat. Bowel sounds are normal.      Palpations: Abdomen is soft.   Musculoskeletal:         General: Swelling and tenderness present.      Cervical back: Normal range of motion and neck supple.   Skin:     General: Skin is warm and dry.      Comments: RLE edematous, erythematous, weeping, tender, warm  Palpable pulse      Noted yeast type rash to perineal area while she is sitting on BS commode     Erythematous rash to back    Neurological:      General: No focal deficit present.      Mental Status: She is alert.   Psychiatric:         Mood and Affect: Mood is anxious.         Behavior: Behavior is agitated.           Significant Labs: All pertinent labs within the past 24 hours have been reviewed.    Significant Imaging: I have reviewed all pertinent imaging results/findings within the past 24 hours.

## 2023-04-21 NOTE — HPI
89 yo female with a PMH of cataract, [place], CKD, DM II, diabetic polyneuropathy, HTN, PAF, TIA presents to the ED For evaluation of fever noted by home health nurse. RLE appears edematous, erythematous, weeping worse for unknown duration. She also has a rash to her perineal area and back. She notes pain to the RLE. Since in the ED she was noted to be in afib RVR, which is now rate controlled with a dose of IV cardizem. Patient is a poor historian, steven, hx of psychiatric problem.     I tried to phone POA for further hx- no answer.     Current OP medications as reviewed per recent cardiology note:       acetaminophen (TYLENOL) 500 MG tablet, Take 1,000 mg by mouth 2 (two) times a day., Disp: , Rfl:     aspirin (ECOTRIN) 81 MG EC tablet, Take 1 tablet (81 mg total) by mouth once daily., Disp: 90 tablet, Rfl: 3    diclofenac sodium (VOLTAREN) 1 % Gel, Apply 2 g topically 4 (four) times daily. Use gloves to apply to right hip for 10 days, Disp: 100 g, Rfl: 1    miconazole NITRATE 2 % (MICOTIN) 2 % top powder, Apply topically 2 (two) times daily. To RLE., Disp: , Rfl: 0    nystatin (MYCOSTATIN) powder, Apply topically 2 (two) times daily., Disp: 30 g, Rfl: 1    pravastatin (PRAVACHOL) 40 MG tablet, Take 1 tablet (40 mg total) by mouth once daily., Disp: 90 tablet, Rfl: 3    silver sulfADIAZINE 1% (SILVADENE) 1 % cream, Apply to RLE skin breakdown twice daily, Disp: , Rfl:     triamcinolone acetonide 0.5% (KENALOG) 0.5 % Crea, Apply to legs with dressing changes., Disp: 454 g, Rfl: 0    vit C/E/Zn/coppr/lutein/zeaxan (OCUVITE LUTEIN AND ZEAXANTHIN ORAL), Take 1 capsule by mouth once daily. Lutien 25 mg, Zeaxanthin 5 mg, Disp: , Rfl:     vitamin E 400 UNIT capsule, Take 400 Units by mouth once daily., Disp: , Rfl:     furosemide (LASIX) 20 MG tablet, Take 1 tablet (20 mg total) by mouth once daily. Hold for weights 150 lb or less, Disp: 30 tablet, Rfl: 11    QUEtiapine (SEROQUEL) 25 MG Tab, Take 1 tablet (25 mg total) by  mouth every evening. (Patient not taking: Reported on 4/12/2023), Disp: 30 tablet, Rfl: 11    sodium zirconium cyclosilicate (LOKELMA) 5 gram packet, Take 1 packet (5 g total) by mouth 2 (two) times a day. Mix entire contents of packet(s) into drinking glass containing 3 tablespoons of water; stir well and drink immediately. Add water and repeat until no powder remains to receive entire dose., Disp: 60 packet, Rfl: 3

## 2023-04-21 NOTE — PROGRESS NOTES
"Pharmacokinetic Initial Assessment: IV Vancomycin    Assessment/Plan:    Initiate intravenous vancomycin with loading dose of 1500 mg (20 mg per kg) once followed by a maintenance dose of vancomycin 1000 mg IV every 24 hours  Desired empiric serum trough concentration is 15 to 20 mcg/mL  Draw vancomycin trough level 60 min prior to third dose on 04/23/2023 at approximately 1500  Pharmacy will continue to follow and monitor vancomycin.      Please contact pharmacy at extension [phone] with any questions regarding this assessment.     Thank you for the consult,   Elsa Jimenez       Patient brief summary:  Jenniffer Jimenez is a 89 y.o. female initiated on antimicrobial therapy with IV Vancomycin for treatment of suspected sepsis    Drug Allergies:   Review of patient's allergies indicates:   Allergen Reactions    Opioids - morphine analogues Other (See Comments)     Bowel issues; bowel obstruction    Tizanidine Other (See Comments)     "Lips were numb,  Almost passed out."    Tramadol Hallucinations    Beta-blockers (beta-adrenergic blocking agts) Other (See Comments)     Can not go on beta blockers for long period of time - due to taking allergy injections    Morphine     Opioids-meperidine and related     Ciprofloxacin Rash       Actual Body Weight:   70.8 kg    Renal Function:   Estimated Creatinine Clearance: 32.9 mL/min (based on SCr of 1.1 mg/dL).,     Dialysis Method (if applicable):  N/A    CBC (last 72 hours):  Recent Labs   Lab Result Units 04/21/23  1132   WBC K/uL 4.66   Hemoglobin g/dL 9.8*   Hematocrit % 30.5*   Platelets K/uL 219   Gran % % 80.4*   Lymph % % 10.3*   Mono % % 7.9   Eosinophil % % 0.4   Basophil % % 0.4   Differential Method  Automated       Metabolic Panel (last 72 hours):  Recent Labs   Lab Result Units 04/21/23  1132 04/21/23  1418   Sodium mmol/L 137  --    Potassium mmol/L 5.0  --    Chloride mmol/L 101  --    CO2 mmol/L 22*  --    Glucose mg/dL 88  --    Glucose, UA  "  --  Negative   BUN mg/dL 24  --    Creatinine mg/dL 1.1  --    Albumin g/dL 3.1*  --    Total Bilirubin mg/dL 0.3  --    Alkaline Phosphatase U/L 120  --    AST U/L 18  --    ALT U/L 13  --    Magnesium mg/dL 2.0  --        Drug levels (last 3 results):  No results for input(s): VANCOMYCINRA, VANCORANDOM, VANCOMYCINPE, VANCOPEAK, VANCOMYCINTR, VANCOTROUGH in the last 72 hours.    Microbiologic Results:  Microbiology Results (last 7 days)       Procedure Component Value Units Date/Time    Blood culture x two cultures. Draw prior to antibiotics. [[id number]] Collected: 04/21/23 1136    Order Status: Sent Specimen: Blood from Peripheral, Antecubital, Left Updated: 04/21/23 1138    Blood culture x two cultures. Draw prior to antibiotics. [[id number]] Collected: 04/21/23 1136    Order Status: Sent Specimen: Blood from Peripheral, Wrist, Right Updated: 04/21/23 1137

## 2023-04-21 NOTE — PHARMACY MED REC
"  [facility]           Pharmacy  Admission Medication History     The home medication history was taken by Rossy Singer.      Medication history obtained from Medications listed below were obtained from: Patient/family    Based on information gathered for medication list, you may go to "Admission" then "Reconcile Home Medications" tabs to review and/or act upon those items.     The home medication list has been updated by the Pharmacy department.   Please read ALL comments highlighted in yellow.   Please address this information as you see fit.    Feel free to contact us if you have any questions or require assistance.    The medications listed below were removed from the home medication list.  Please reorder if appropriate:    Patient reports NOT TAKING the following medication(s):  Kenalog cream  Ocuvite       No current facility-administered medications on file prior to encounter.     Current Outpatient Medications on File Prior to Encounter   Medication Sig Dispense Refill    acetaminophen (TYLENOL) 500 MG tablet Take 1,000 mg by mouth 2 (two) times a day.      aspirin (ECOTRIN) 81 MG EC tablet Take 1 tablet (81 mg total) by mouth once daily. 90 tablet 3    diclofenac sodium (VOLTAREN) 1 % Gel Apply 2 g topically 4 (four) times daily. Apply to right hip      furosemide (LASIX) 20 MG tablet Take 1 tablet (20 mg total) by mouth once daily. Hold for weights 150 lb or less, if weight is 155 or over, take 2 tablets in the morning 30 tablet 11    nystatin (MYCOSTATIN) powder Apply topically 2 (two) times daily. 30 g 1    pravastatin (PRAVACHOL) 40 MG tablet Take 1 tablet (40 mg total) by mouth once daily. 90 tablet 3    propylene glycol (SYSTANE COMPLETE OPHT) Apply to eye.      silver sulfADIAZINE 1% (SILVADENE) 1 % cream Apply to RLE skin breakdown twice daily (Patient taking differently: Apply topically 2 (two) times daily. Apply to RLE skin breakdown twice daily)      vitamin E 400 UNIT " capsule Take 400 Units by mouth once daily.      QUEtiapine (SEROQUEL) 25 MG Tab Take 1 tablet (25 mg total) by mouth every evening. (Patient not taking: Reported on 4/21/2023) 30 tablet 11    sodium zirconium cyclosilicate (LOKELMA) 5 gram packet Take 1 packet (5 g total) by mouth 2 (two) times a day. Mix entire contents of packet(s) into drinking glass containing 3 tablespoons of water; stir well and drink immediately. Add water and repeat until no powder remains to receive entire dose. (Patient not taking: Reported on 4/21/2023) 60 packet 3       Please address this information as you see fit.  Feel free to contact us if you have any questions or require assistance.    Rossy Singer  [phone]                .

## 2023-04-21 NOTE — ED PROVIDER NOTES
"Encounter Date: 4/21/2023       History     Chief Complaint   Patient presents with    Shortness of Breath     Patient started having SOB overnight. Patient has a pmhx of CHF and Afib. Patient is winded when trying to speak. Patient arrived in wheelchair. Walks at home with walker.     Daughter at bedside states that home health nurse came to evaluate the patient noticed that vital signs were abnormal a elevated temperature and was advised to report to the emergency department.  The patient is complaining of some shortness of breath.  She arrives on nasal cannula, she does not wear home oxygen.  She does admit to a cough.  Denies any urinary complaints.  Does complain of yeast to the groin area, states that she is been taking a power for this.    Review of patient's allergies indicates:   Allergen Reactions    Opioids - morphine analogues Other (See Comments)     Bowel issues; bowel obstruction    Tizanidine Other (See Comments)     "Lips were numb,  Almost passed out."    Tramadol Hallucinations    Beta-blockers (beta-adrenergic blocking agts) Other (See Comments)     Can not go on beta blockers for long period of time - due to taking allergy injections    Morphine     Opioids-meperidine and related     Ciprofloxacin Rash     Past Medical History:   Diagnosis Date    Amblyopia of left eye 04/10/2013    Arthritis     Facet arthropathy, Lumbosacral    Atherosclerosis of aorta     Cataract     Central retinal vein occlusion of left eye     CKD (chronic kidney disease) stage 3, GFR 30-59 ml/min     Diabetes mellitus, type 2     Diabetic polyneuropathy 01/06/2022    Essential (primary) hypertension     Exotropia of both eyes 02/06/2013    recession RSR 5.0mm w/ adj; recession LR os 5.0 w/ adj; resect MR os  4.0mm    Hearing loss     History of resection of small bowel     Hypertensive retinopathy of both eyes     Hypoglycemia     Macular degeneration     OA (osteoarthritis) of shoulder     Right    Osteoporosis     " Paroxysmal atrial fibrillation     Posterior vitreous detachment of both eyes     Psychiatric problem     Rhinitis     TIA (transient ischemic attack)      Past Surgical History:   Procedure Laterality Date    APPENDECTOMY      CARDIAC CATHETERIZATION      CATARACT EXTRACTION W/  INTRAOCULAR LENS IMPLANT Bilateral      SECTION, CLASSIC      CLOSURE OF LEFT ATRIAL APPENDAGE USING DEVICE N/A 2020    Procedure: Left atrial appendage closure device;  Surgeon: Abundio Curtis MD;  Location: [facility] CATH LAB;  Service: Cardiology;  Laterality: N/A;    HYSTERECTOMY      INNER EAR SURGERY      JOINT REPLACEMENT      LEFT KNEE REPLACEMENT IN 2013 -    OOPHORECTOMY      SINUS SURGERY      STRABISMUS SURGERY  13    RSR recession 5 mm, LLR recession 5 mm and LMR resection 4mm    STRABISMUS SURGERY  2014    recess LR OD 6mm    TONSILLECTOMY      watchman surgery N/A 2020     Family History   Problem Relation Age of Onset    Hypertension Mother     Hypertension Father     Liver disease Sister     Diabetes Sister     Heart disease Sister     Diabetes Brother     Breast cancer Maternal Aunt     No Known Problems Maternal Uncle     No Known Problems Paternal Aunt     No Known Problems Paternal Uncle     No Known Problems Maternal Grandmother     No Known Problems Maternal Grandfather     No Known Problems Paternal Grandmother     No Known Problems Paternal Grandfather     No Known Problems Daughter     No Known Problems Son     Heart disease Sister     No Known Problems Son     No Known Problems Son     Cancer Neg Hx         in first degree relatives    Melanoma Neg Hx     Psoriasis Neg Hx     Lupus Neg Hx     Amblyopia Neg Hx     Blindness Neg Hx     Cataracts Neg Hx     Glaucoma Neg Hx     Macular degeneration Neg Hx     Retinal detachment Neg Hx     Strabismus Neg Hx     Stroke Neg Hx     Thyroid disease Neg Hx      Social History     Tobacco Use    Smoking status: Former     Types: Cigarettes     Quit  date: 10/29/1982     Years since quittin.5     Passive exposure: Never    Smokeless tobacco: Never   Substance Use Topics    Alcohol use: Not Currently     Alcohol/week: 2.0 standard drinks     Types: 2 Shots of liquor per week     Comment: rare    Drug use: No     Review of Systems   Constitutional:  Positive for fever.   Respiratory:  Positive for cough and shortness of breath.      Physical Exam     Initial Vitals [23 1111]   BP Pulse Resp Temp SpO2   (!) 140/69 109 (!) 22 (!) 101.8 °F (38.8 °C) (!) 92 %      MAP       --         Physical Exam    Vitals reviewed.  Constitutional: She appears well-developed and well-nourished.   Cardiovascular:            Tachycardic rate, no murmurs noted   Pulmonary/Chest:   Coarse breath sounds bilaterally   Abdominal: Abdomen is soft. There is no abdominal tenderness.   Musculoskeletal:         General: Normal range of motion.     Neurological: She is alert and oriented to person, place, and time.   Skin:   Candida infection noted to the bilateral groin area  Erythema, edema, calor noted to right lower extremity   Left lower extremity shows no signs of infection       ED Course   Critical Care    Date/Time: 2023 3:00 PM  Performed by: Remi Costa DO  Authorized by: Remi Costa DO   Direct patient critical care time: 20 minutes  Additional history critical care time: 10 minutes  Ordering / reviewing critical care time: 5 minutes  Documentation critical care time: 5 minutes  Consulting other physicians critical care time: 5 minutes  Total critical care time (exclusive of procedural time) : 45 minutes  Critical care time was exclusive of separately billable procedures and treating other patients.  Critical care was necessary to treat or prevent imminent or life-threatening deterioration of the following conditions: sepsis and respiratory failure.  Critical care was time spent personally by me on the following activities: development of  treatment plan with patient or surrogate, discussions with consultants, interpretation of cardiac output measurements, evaluation of patient's response to treatment, examination of patient, obtaining history from patient or surrogate, ordering and performing treatments and interventions, ordering and review of laboratory studies, ordering and review of radiographic studies, pulse oximetry, re-evaluation of patient's condition and review of old charts.      Labs Reviewed   CBC W/ AUTO DIFFERENTIAL - Abnormal; Notable for the following components:       Result Value    RBC 3.57 (*)     Hemoglobin 9.8 (*)     Hematocrit 30.5 (*)     RDW 18.1 (*)     Immature Granulocytes 0.6 (*)     Lymph # 0.5 (*)     Gran % 80.4 (*)     Lymph % 10.3 (*)     All other components within normal limits   COMPREHENSIVE METABOLIC PANEL - Abnormal; Notable for the following components:    CO2 22 (*)     Calcium 8.4 (*)     Albumin 3.1 (*)     eGFR 47 (*)     All other components within normal limits   B-TYPE NATRIURETIC PEPTIDE - Abnormal; Notable for the following components:     (*)     All other components within normal limits   URINALYSIS, REFLEX TO URINE CULTURE - Abnormal; Notable for the following components:    Protein, UA 1+ (*)     All other components within normal limits    Narrative:     Specimen Source->Urine   URINALYSIS MICROSCOPIC - Abnormal; Notable for the following components:    Hyaline Casts, UA 2 (*)     All other components within normal limits    Narrative:     Specimen Source->Urine   ISTAT PROCEDURE - Abnormal; Notable for the following components:    POC PO2 16 (*)     POC SATURATED O2 24 (*)     All other components within normal limits   POCT GLUCOSE - Abnormal; Notable for the following components:    POCT Glucose 148 (*)     All other components within normal limits   LACTIC ACID, PLASMA   LACTIC ACID, PLASMA   PROCALCITONIN   MAGNESIUM   TROPONIN I   POCT GLUCOSE MONITORING CONTINUOUS     EKG  Readings: (Independently Interpreted)   Atrial fibrillation rate of 103, right axis deviation, artifact noted, nonspecific T-wave abnormalities interpreted by me     Imaging Results              US Lower Extremity Arteries Right (Final result)  Result time 04/21/23 18:28:44      Final result by Alcon Blanc DO (04/21/23 18:28:44)                   Impression:      1. No evidence of high-grade stenosis of the right lower extremity arteries.  2. Monophasic waveforms in the popliteal artery and calf arteries, likely related to vessel hardening from atherosclerotic disease.      Electronically signed by: Alcon Blanc  Date:    04/21/2023  Time:    18:28               Narrative:    EXAMINATION:  US LOWER EXTREMITY ARTERIES RIGHT    CLINICAL HISTORY:  nonhealing wounds;    TECHNIQUE:  Ultrasound arterial lower extremity, right.    COMPARISON:  Ultrasound from 11/09/2022.    FINDINGS:  Right lower extremity.    CFA: 170 cm/sec, triphasic.    DFA: 126 cm/sec, triphasic.    Prox SFA: 173 cm/sec, triphasic.    Mid SFA: 180 cm/sec, triphasic.    Dist SFA: 141 cm/sec, biphasic.    Prox pop A: 114 cm/sec, monophasic.    Distal pop A: 169 cm/sec, monophasic.    COTY: 92 cm/sec, monophasic.    PTA: 93 cm/sec, monophasic.                                       X-Ray Chest AP Portable (Final result)  Result time 04/21/23 12:17:04      Final result by Austin Us MD (04/21/23 12:17:04)                   Impression:      Cardiomegaly with pulmonary vascular congestion and trace left-sided pleural effusions, suggestive of fluid overload state.      Electronically signed by: Austin Us MD  Date:    04/21/2023  Time:    12:17               Narrative:    EXAMINATION:  XR CHEST AP PORTABLE    CLINICAL HISTORY:  Sepsis;    TECHNIQUE:  Single frontal view of the chest was performed.    COMPARISON:  04/07/2023.    FINDINGS:  There are changes of prior intramedullary fixation of the left humerus.  There are degenerative changes in the  osseous structures.    There are calcifications of the trachea.  There are calcifications of the aortic knob.  There are postoperative changes in the left paramediastinal region.  The cardiomediastinal silhouette is enlarged.  There is no evidence of free air beneath the hemidiaphragms.  There is no pleural effusion on the right.  There is stable small left-sided pleural effusion.  There is no evidence of a pneumothorax.  There is no evidence of pneumomediastinum.  There is pulmonary interstitial edema.  There is no focal consolidation.  There are degenerative changes in the osseous structures.                                       Medications   vancomycin - pharmacy to dose (has no administration in time range)   piperacillin-tazobactam (ZOSYN) 4.5 g in dextrose 5 % in water (D5W) 5 % 100 mL IVPB (MB+) (0 g Intravenous Stopped 4/22/23 1404)   fluconazole tablet 200 mg (200 mg Oral Given 4/22/23 0955)   sodium chloride 0.9% flush 10 mL (has no administration in time range)   acetaminophen tablet 650 mg (650 mg Oral Given 4/21/23 2023)   naloxone 0.4 mg/mL injection 0.02 mg (has no administration in time range)   enoxaparin injection 40 mg (40 mg Subcutaneous Given 4/21/23 2024)   ondansetron injection 4 mg (has no administration in time range)   miconazole NITRATE 2 % top powder ( Topical (Top) Given 4/22/23 1038)   albuterol-ipratropium 2.5 mg-0.5 mg/3 mL nebulizer solution 3 mL (3 mLs Nebulization Given 4/22/23 1142)   aspirin chewable tablet 81 mg (81 mg Oral Given 4/22/23 0958)   glucagon (human recombinant) injection 1 mg (has no administration in time range)   dextrose 10% bolus 125 mL 125 mL (has no administration in time range)   dextrose 10% bolus 250 mL 250 mL (has no administration in time range)   dextrose 40 % gel 15,000 mg (has no administration in time range)   dextrose 40 % gel 30,000 mg (has no administration in time range)   insulin aspart U-100 pen 0-5 Units (has no administration in time range)    furosemide tablet 20 mg (20 mg Oral Given 4/22/23 0958)   acetaminophen tablet 1,000 mg (has no administration in time range)   diphenhydrAMINE-zinc acetate 2-0.1% cream ( Topical (Top) Given 4/22/23 1458)   vancomycin (VANCOCIN) 1,000 mg in dextrose 5 % (D5W) 250 mL IVPB (Vial-Mate) (1,000 mg Intravenous New Bag 4/22/23 1628)   lactated ringers bolus 1,710 mL (0 mLs Intravenous Stopped 4/21/23 1520)   acetaminophen tablet 1,000 mg (1,000 mg Oral Given 4/21/23 1131)   albuterol-ipratropium 2.5 mg-0.5 mg/3 mL nebulizer solution 3 mL (3 mLs Nebulization Given 4/21/23 1148)   diltiaZEM injection 10 mg (10 mg Intravenous Given 4/21/23 1343)   piperacillin-tazobactam (ZOSYN) 4.5 g in dextrose 5 % in water (D5W) 5 % 100 mL IVPB (MB+) (0 g Intravenous Stopped 4/21/23 1630)   vancomycin 1,500 mg in dextrose 5 % (D5W) 250 mL IVPB (Vial-Mate) (0 mg Intravenous Stopped 4/21/23 1851)   acetaminophen tablet 325 mg (325 mg Oral Given 4/21/23 2201)   furosemide injection 20 mg (20 mg Intravenous Given 4/22/23 1214)     Medical Decision Making:   ED Management:  Chest x-ray reviewed and shows cardiomegaly with no other acute processes noted of an her within           ED Course as of 04/22/23 1710 Fri Apr 21, 2023   1131 Urinalysis, Reflex to Urine Culture Urine, Clean Catch [CD]   1155 CBC auto differential(!)  Reviewed, chronic normocytic anemia [CD]   1225 Brain natriuretic peptide(!)  Reviewed, elevated [CD]   1225 Troponin I  Reviewd, wnl [CD]   1242 Comprehensive metabolic panel(!)  Reviewed, nonspecific findings [CD]   1300 Lactic acid, plasma #1  Reviewed within normal limits [CD]   1311 Procalcitonin  Reviewed, wnl [CD]   1456 Spoke with [facility] who will admit the patient [CD]      ED Course User Index  [CD] Remi Costa DO                 Clinical Impression:   Final diagnoses:  [R06.02] SOB (shortness of breath)  [A41.9] Sepsis        ED Disposition Condition    Observation                 Remi CRONIN  DO Julissa  04/22/23 [id number]

## 2023-04-21 NOTE — ASSESSMENT & PLAN NOTE
Patient with Persistent (7 days or more) atrial fibrillation which is controlled currently with Calcium Channel Blocker. Patient is currently in atrial fibrillation.[id number] Score: 4. HASBLED Score:  Anticoagulation not on anticoagulation due to watchman device    Normally rate controlled   afib rvr in the ED   IV cardizem given

## 2023-04-21 NOTE — ASSESSMENT & PLAN NOTE
Lab Results   Component Value Date    HGBA1C 6.0 (H) 01/26/2023     Low CSI   Consistent carb diet

## 2023-04-21 NOTE — ASSESSMENT & PLAN NOTE
This patient does have evidence of infective focus  My overall impression is sepsis.  Source: Skin and Soft Tissue (location RLE)  Antibiotics given-   Antibiotics (72h ago, onward)    Start     Stop Route Frequency Ordered    04/22/23 1600  vancomycin in dextrose 5 % 1 gram/250 mL IVPB 1,000 mg         -- IV Every 24 hours (non-standard times) 04/21/23 1539    04/21/23 2330  piperacillin-tazobactam (ZOSYN) 4.5 g in dextrose 5 % in water (D5W) 5 % 100 mL IVPB (MB+)         -- IV Every 8 hours (non-standard times) 04/21/23 1505    04/21/23 1600  vancomycin - pharmacy to dose  (vancomycin IVPB)        See Hyperspace for full Linked Orders Report.    -- IV pharmacy to manage frequency 04/21/23 1505    04/21/23 1515  vancomycin 1,500 mg in dextrose 5 % (D5W) 250 mL IVPB (Vial-Mate)         04/22 0314 IV ED 1 Time 04/21/23 1501        Latest lactate reviewed-  Recent Labs   Lab 04/21/23  1132 04/21/23  1457   LACTATE 1.5 2.2     Organ dysfunction indicated by no organ dysfunction     Fluid challenge Actual Body weight- Patient will receive 30ml/kg actual body weight to calculate fluid bolus for treatment of septic shock.     Post- resuscitation assessment Yes Perfusion exam was performed within 6 hours of septic shock presentation after bolus shows Adequate tissue perfusion assessed by non-invasive monitoring       Will Not start Pressors- Levophed for MAP of 65  Source control achieved by: IV vancomycin and zosyn     Wound care   PRN tylenol for pain or fever. Patient has multiple allergies  Diflucan and topical tx for yeast   Blood cx pending

## 2023-04-21 NOTE — ED NOTES
Pt assisted to bedside commode x 2 for BM, refused bed pan. Pt increased SOB, tachypneic, audible wheezing noted, tachycardic. KB explained to pt she can no longer get out of bed. She must use a bed pan, pt verbalized understanding.

## 2023-04-21 NOTE — ED NOTES
Pt eating subway sandwich brought by daughter. Bedside commode placed, pt refused purewick, antibx infusing.

## 2023-04-21 NOTE — H&P
[facility] Medicine  History & Physical    Patient Name: Jenniffer Jimenez  MRN: [id number]  Patient Class: OP- Observation  Admission Date: 4/21/2023  Attending Physician: Christy Iniguez MD   Primary Care Provider: Rubi Young DO         Patient information was obtained from patient, past medical records and ER records.     Subjective:     Principal Problem:Sepsis    Chief Complaint:   Chief Complaint   Patient presents with    Shortness of Breath     Patient started having SOB overnight. Patient has a pmhx of CHF and Afib. Patient is winded when trying to speak. Patient arrived in wheelchair. Walks at home with walker.        HPI: 89 yo female with a PMH of cataract, [place], CKD, DM II, diabetic polyneuropathy, HTN, PAF, TIA presents to the ED For evaluation of fever noted by home health nurse. RLE appears edematous, erythematous, weeping worse for unknown duration. She also has a rash to her perineal area and back. She notes pain to the RLE. Since in the ED she was noted to be in afib RVR, which is now rate controlled with a dose of IV cardizem. Patient is a poor historian, steven, hx of psychiatric problem.     I tried to phone POA for further hx- no answer.     Current OP medications as reviewed per recent cardiology note:       acetaminophen (TYLENOL) 500 MG tablet, Take 1,000 mg by mouth 2 (two) times a day., Disp: , Rfl:     aspirin (ECOTRIN) 81 MG EC tablet, Take 1 tablet (81 mg total) by mouth once daily., Disp: 90 tablet, Rfl: 3    diclofenac sodium (VOLTAREN) 1 % Gel, Apply 2 g topically 4 (four) times daily. Use gloves to apply to right hip for 10 days, Disp: 100 g, Rfl: 1    miconazole NITRATE 2 % (MICOTIN) 2 % top powder, Apply topically 2 (two) times daily. To RLE., Disp: , Rfl: 0    nystatin (MYCOSTATIN) powder, Apply topically 2 (two) times daily., Disp: 30 g, Rfl: 1    pravastatin (PRAVACHOL) 40 MG tablet, Take 1 tablet (40 mg total) by mouth once daily., Disp: 90  tablet, Rfl: 3    silver sulfADIAZINE 1% (SILVADENE) 1 % cream, Apply to RLE skin breakdown twice daily, Disp: , Rfl:     triamcinolone acetonide 0.5% (KENALOG) 0.5 % Crea, Apply to legs with dressing changes., Disp: 454 g, Rfl: 0    vit C/E/Zn/coppr/lutein/zeaxan (OCUVITE LUTEIN AND ZEAXANTHIN ORAL), Take 1 capsule by mouth once daily. Lutien 25 mg, Zeaxanthin 5 mg, Disp: , Rfl:     vitamin E 400 UNIT capsule, Take 400 Units by mouth once daily., Disp: , Rfl:     furosemide (LASIX) 20 MG tablet, Take 1 tablet (20 mg total) by mouth once daily. Hold for weights 150 lb or less, Disp: 30 tablet, Rfl: 11    QUEtiapine (SEROQUEL) 25 MG Tab, Take 1 tablet (25 mg total) by mouth every evening. (Patient not taking: Reported on 4/12/2023), Disp: 30 tablet, Rfl: 11    sodium zirconium cyclosilicate (LOKELMA) 5 gram packet, Take 1 packet (5 g total) by mouth 2 (two) times a day. Mix entire contents of packet(s) into drinking glass containing 3 tablespoons of water; stir well and drink immediately. Add water and repeat until no powder remains to receive entire dose., Disp: 60 packet, Rfl: 3       Past Medical History:   Diagnosis Date    Amblyopia of left eye 04/10/2013    Arthritis     Facet arthropathy, Lumbosacral    Atherosclerosis of aorta     Cataract     Central retinal vein occlusion of left eye     CKD (chronic kidney disease) stage 3, GFR 30-59 ml/min     Diabetes mellitus, type 2     Diabetic polyneuropathy 01/06/2022    Essential (primary) hypertension     Exotropia of both eyes 02/06/2013    recession RSR 5.0mm w/ adj; recession LR os 5.0 w/ adj; resect MR os  4.0mm    Hearing loss     History of resection of small bowel     Hypertensive retinopathy of both eyes     Hypoglycemia     Macular degeneration     OA (osteoarthritis) of shoulder     Right    Osteoporosis     Paroxysmal atrial fibrillation     Posterior vitreous detachment of both eyes     Psychiatric problem     Rhinitis      "TIA (transient ischemic attack)        Past Surgical History:   Procedure Laterality Date    APPENDECTOMY      CARDIAC CATHETERIZATION      CATARACT EXTRACTION W/  INTRAOCULAR LENS IMPLANT Bilateral      SECTION, CLASSIC      CLOSURE OF LEFT ATRIAL APPENDAGE USING DEVICE N/A 2020    Procedure: Left atrial appendage closure device;  Surgeon: Abundio Curtis MD;  Location: [facility] CATH LAB;  Service: Cardiology;  Laterality: N/A;    HYSTERECTOMY      INNER EAR SURGERY      JOINT REPLACEMENT      LEFT KNEE REPLACEMENT IN 2013 -    OOPHORECTOMY      SINUS SURGERY      STRABISMUS SURGERY  13    RSR recession 5 mm, LLR recession 5 mm and LMR resection 4mm    STRABISMUS SURGERY  2014    recess LR OD 6mm    TONSILLECTOMY      watchman surgery N/A 2020       Review of patient's allergies indicates:   Allergen Reactions    Opioids - morphine analogues Other (See Comments)     Bowel issues; bowel obstruction    Tizanidine Other (See Comments)     "Lips were numb,  Almost passed out."    Tramadol Hallucinations    Beta-blockers (beta-adrenergic blocking agts) Other (See Comments)     Can not go on beta blockers for long period of time - due to taking allergy injections    Morphine     Opioids-meperidine and related     Ciprofloxacin Rash       No current facility-administered medications on file prior to encounter.     Current Outpatient Medications on File Prior to Encounter   Medication Sig    acetaminophen (TYLENOL) 500 MG tablet Take 1,000 mg by mouth 2 (two) times a day.    aspirin (ECOTRIN) 81 MG EC tablet Take 1 tablet (81 mg total) by mouth once daily.    diclofenac sodium (VOLTAREN) 1 % Gel Apply 2 g topically 4 (four) times daily. Use gloves to apply to right hip for 10 days    furosemide (LASIX) 20 MG tablet Take 1 tablet (20 mg total) by mouth once daily. Hold for weights 150 lb or less, if weight is 155 or over, take 2 tablets in the morning    miconazole NITRATE 2 " % (MICOTIN) 2 % top powder Apply topically 2 (two) times daily. To RLE.    nystatin (MYCOSTATIN) powder Apply topically 2 (two) times daily.    pravastatin (PRAVACHOL) 40 MG tablet Take 1 tablet (40 mg total) by mouth once daily.    QUEtiapine (SEROQUEL) 25 MG Tab Take 1 tablet (25 mg total) by mouth every evening. (Patient not taking: Reported on 2023)    silver sulfADIAZINE 1% (SILVADENE) 1 % cream Apply to RLE skin breakdown twice daily    sodium zirconium cyclosilicate (LOKELMA) 5 gram packet Take 1 packet (5 g total) by mouth 2 (two) times a day. Mix entire contents of packet(s) into drinking glass containing 3 tablespoons of water; stir well and drink immediately. Add water and repeat until no powder remains to receive entire dose.    triamcinolone acetonide 0.5% (KENALOG) 0.5 % Crea Apply to legs with dressing changes.    vit C/E/Zn/coppr/lutein/zeaxan (OCUVITE LUTEIN AND ZEAXANTHIN ORAL) Take 1 capsule by mouth once daily. Lutien 25 mg, Zeaxanthin 5 mg    vitamin E 400 UNIT capsule Take 400 Units by mouth once daily.     Family History       Problem Relation (Age of Onset)    Breast cancer Maternal Aunt    Diabetes Sister, Brother    Heart disease Sister, Sister    Hypertension Mother, Father    Liver disease Sister    No Known Problems Maternal Uncle, Paternal Aunt, Paternal Uncle, Maternal Grandmother, Maternal Grandfather, Paternal Grandmother, Paternal Grandfather, Daughter, Son, Son, Son          Tobacco Use    Smoking status: Former     Types: Cigarettes     Quit date: 10/29/1982     Years since quittin.5     Passive exposure: Never    Smokeless tobacco: Never   Substance and Sexual Activity    Alcohol use: Not Currently     Alcohol/week: 2.0 standard drinks     Types: 2 Shots of liquor per week     Comment: rare    Drug use: No    Sexual activity: Never     Review of Systems  Objective:     Vital Signs (Most Recent):  Temp: 98.1 °F (36.7 °C) (23 1526)  Pulse: 105 (23  1540)  Resp: (!) 27 (04/21/23 1433)  BP: (!) 98/53 (04/21/23 1542)  SpO2: 96 % (04/21/23 1540)   Vital Signs (24h Range):  Temp:  [98.1 °F (36.7 °C)-101.8 °F (38.8 °C)] 98.1 °F (36.7 °C)  Pulse:  [102-111] 105  Resp:  [22-27] 27  SpO2:  [92 %-100 %] 96 %  BP: ()/(53-69) 98/53     Weight: 70.8 kg (156 lb)  Body mass index is 25.96 kg/m².    Physical Exam  Constitutional:       Appearance: She is ill-appearing.   HENT:      Head: Normocephalic and atraumatic.      Mouth/Throat:      Pharynx: Oropharynx is clear.   Eyes:      Conjunctiva/sclera: Conjunctivae normal.   Cardiovascular:      Rate and Rhythm: Tachycardia present. Rhythm irregular.      Pulses: Normal pulses.      Heart sounds: Normal heart sounds.   Pulmonary:      Effort: Pulmonary effort is normal.      Comments: Mildly coarse   Abdominal:      General: Abdomen is flat. Bowel sounds are normal.      Palpations: Abdomen is soft.   Musculoskeletal:         General: Swelling and tenderness present.      Cervical back: Normal range of motion and neck supple.   Skin:     General: Skin is warm and dry.      Comments: RLE edematous, erythematous, weeping, tender, warm  Palpable pulse     Noted yeast type rash to perineal area while she is sitting on BS commode     Erythematous rash to back    Neurological:      General: No focal deficit present.      Mental Status: She is alert.   Psychiatric:         Mood and Affect: Mood is anxious.         Behavior: Behavior is agitated.           Significant Labs: All pertinent labs within the past 24 hours have been reviewed.    Significant Imaging: I have reviewed all pertinent imaging results/findings within the past 24 hours.    Assessment/Plan:     * Sepsis  This patient does have evidence of infective focus  My overall impression is sepsis.  Source: Skin and Soft Tissue (location RLE)  Antibiotics given-   Antibiotics (72h ago, onward)    Start     Stop Route Frequency Ordered    04/22/23 1600  vancomycin in  dextrose 5 % 1 gram/250 mL IVPB 1,000 mg         -- IV Every 24 hours (non-standard times) 04/21/23 1539    04/21/23 2330  piperacillin-tazobactam (ZOSYN) 4.5 g in dextrose 5 % in water (D5W) 5 % 100 mL IVPB (MB+)         -- IV Every 8 hours (non-standard times) 04/21/23 1505    04/21/23 1600  vancomycin - pharmacy to dose  (vancomycin IVPB)        See Hyperspace for full Linked Orders Report.    -- IV pharmacy to manage frequency 04/21/23 1505    04/21/23 1515  vancomycin 1,500 mg in dextrose 5 % (D5W) 250 mL IVPB (Vial-Mate)         04/22 0314 IV ED 1 Time 04/21/23 1501        Latest lactate reviewed-  Recent Labs   Lab 04/21/23  1132 04/21/23  1457   LACTATE 1.5 2.2     Organ dysfunction indicated by no organ dysfunction     Fluid challenge Actual Body weight- Patient will receive 30ml/kg actual body weight to calculate fluid bolus for treatment of septic shock.     Post- resuscitation assessment Yes Perfusion exam was performed within 6 hours of septic shock presentation after bolus shows Adequate tissue perfusion assessed by non-invasive monitoring       Will Not start Pressors- Levophed for MAP of 65  Source control achieved by: IV vancomycin and zosyn     Wound care   PRN tylenol for pain or fever. Patient has multiple allergies  Diflucan and topical tx for yeast   Blood cx pending     Venous insufficiency of both lower extremities    Palpate R dorsalis pedal pulse   Will US RLE arterial for further vascular assessment     Prediabetes    Lab Results   Component Value Date    HGBA1C 6.0 (H) 01/26/2023     Low CSI   Consistent carb diet     History of TIA (transient ischemic attack)    Resume asa  Hold statin while septic     Chronic diastolic heart failure    Patient is identified as having Diastolic (HFpEF) heart failure that is Chronic. CHF is currently controlled. Latest ECHO performed and demonstrates- Results for orders placed during the hospital encounter of 10/07/22    Echo    Interpretation Summary  ·  The left ventricle is normal in size with concentric remodeling and normal systolic function.  · The estimated ejection fraction is 55-60%.  · Grade III left ventricular diastolic dysfunction.  · Mild mitral regurgitation.  · Normal right ventricular size with mildly reduced right ventricular systolic function.  · Severe tricuspid regurgitation.  · The estimated PA systolic pressure is 52 mmHg. PASP may be under-estimated due to TR severity.  · Elevated central venous pressure (15 mmHg).  · There is pulmonary hypertension.  · Severe left atrial enlargement.  . Continue Furosemide and monitor clinical status closely. Monitor on telemetry. Patient is off CHF pathway.  Monitor strict Is&Os and daily weights.  Place on fluid restriction of 1.5 L. Continue to stress to patient importance of self efficacy and  on diet for CHF. Last BNP reviewed- and noted below   Recent Labs   Lab 04/21/23  1132   *   .    Hold lasix while septic       Stage 3b chronic kidney disease    Cr 1.1  Baseline     Chronic atrial fibrillation  Patient with Persistent (7 days or more) atrial fibrillation which is controlled currently with Calcium Channel Blocker. Patient is currently in atrial fibrillation.[id number] Score: 4. HASBLED Score:  Anticoagulation not on anticoagulation due to watchman device    Normally rate controlled   afib rvr in the ED   IV cardizem given       Primary hypertension    Hold medications while septic     Pure hypercholesterolemia    Hold statin while septic       VTE Risk Mitigation (From admission, onward)         Ordered     enoxaparin injection 40 mg  Daily         04/21/23 1507     IP VTE HIGH RISK PATIENT  Once         04/21/23 1507     Place sequential compression device  Until discontinued         04/21/23 1507                     On 04/21/2023, patient should be placed in hospital observation services under my care in collaboration with Dr. Christy Iniguez.      Amna Hernandez NP  Department of  [facility] Medicine  [place] - Emergency Dept

## 2023-04-22 PROBLEM — D50.9 MICROCYTIC ANEMIA: Status: ACTIVE | Noted: 2023-04-22

## 2023-04-22 LAB
ABO + RH BLD: NORMAL
ALBUMIN SERPL BCP-MCNC: 2.2 G/DL (ref 3.5–5.2)
ALP SERPL-CCNC: 86 U/L (ref 55–135)
ALT SERPL W/O P-5'-P-CCNC: 8 U/L (ref 10–44)
ANION GAP SERPL CALC-SCNC: 10 MMOL/L (ref 8–16)
AST SERPL-CCNC: 11 U/L (ref 10–40)
BASOPHILS # BLD AUTO: 0.01 K/UL (ref 0–0.2)
BASOPHILS NFR BLD: 0.3 % (ref 0–1.9)
BILIRUB SERPL-MCNC: 0.3 MG/DL (ref 0.1–1)
BLD GP AB SCN CELLS X3 SERPL QL: NORMAL
BUN SERPL-MCNC: 27 MG/DL (ref 10–30)
CALCIUM SERPL-MCNC: 7.6 MG/DL (ref 8.7–10.5)
CHLORIDE SERPL-SCNC: 104 MMOL/L (ref 95–110)
CO2 SERPL-SCNC: 21 MMOL/L (ref 23–29)
CREAT SERPL-MCNC: 1.2 MG/DL (ref 0.5–1.4)
CRP SERPL-MCNC: 111.6 MG/L (ref 0–8.2)
DIFFERENTIAL METHOD: ABNORMAL
EOSINOPHIL # BLD AUTO: 0 K/UL (ref 0–0.5)
EOSINOPHIL NFR BLD: 0.5 % (ref 0–8)
ERYTHROCYTE [DISTWIDTH] IN BLOOD BY AUTOMATED COUNT: 17.9 % (ref 11.5–14.5)
ERYTHROCYTE [SEDIMENTATION RATE] IN BLOOD BY PHOTOMETRIC METHOD: 68 MM/HR (ref 0–36)
EST. GFR  (NO RACE VARIABLE): 43 ML/MIN/1.73 M^2
FERRITIN SERPL-MCNC: 109 NG/ML (ref 20–300)
FOLATE SERPL-MCNC: 12.7 NG/ML (ref 4–24)
GLUCOSE SERPL-MCNC: 107 MG/DL (ref 70–110)
HCT VFR BLD AUTO: 22.6 % (ref 37–48.5)
HGB BLD-MCNC: 7.3 G/DL (ref 12–16)
IMM GRANULOCYTES # BLD AUTO: 0.03 K/UL (ref 0–0.04)
IMM GRANULOCYTES NFR BLD AUTO: 0.8 % (ref 0–0.5)
INR PPP: 1.1 (ref 0.8–1.2)
IRON SERPL-MCNC: <10 UG/DL (ref 30–160)
LYMPHOCYTES # BLD AUTO: 0.7 K/UL (ref 1–4.8)
LYMPHOCYTES NFR BLD: 18.4 % (ref 18–48)
MAGNESIUM SERPL-MCNC: 2 MG/DL (ref 1.6–2.6)
MCH RBC QN AUTO: 27.8 PG (ref 27–31)
MCHC RBC AUTO-ENTMCNC: 32.3 G/DL (ref 32–36)
MCV RBC AUTO: 86 FL (ref 82–98)
MONOCYTES # BLD AUTO: 0.6 K/UL (ref 0.3–1)
MONOCYTES NFR BLD: 15.3 % (ref 4–15)
NEUTROPHILS # BLD AUTO: 2.5 K/UL (ref 1.8–7.7)
NEUTROPHILS NFR BLD: 64.7 % (ref 38–73)
NRBC BLD-RTO: 0 /100 WBC
PHOSPHATE SERPL-MCNC: 3.7 MG/DL (ref 2.7–4.5)
PLATELET # BLD AUTO: 161 K/UL (ref 150–450)
PMV BLD AUTO: 10.3 FL (ref 9.2–12.9)
POCT GLUCOSE: 104 MG/DL (ref 70–110)
POCT GLUCOSE: 113 MG/DL (ref 70–110)
POCT GLUCOSE: 153 MG/DL (ref 70–110)
POCT GLUCOSE: 94 MG/DL (ref 70–110)
POTASSIUM SERPL-SCNC: 4.1 MMOL/L (ref 3.5–5.1)
PROT SERPL-MCNC: 5.3 G/DL (ref 6–8.4)
PROTHROMBIN TIME: 11.2 SEC (ref 9–12.5)
RBC # BLD AUTO: 2.63 M/UL (ref 4–5.4)
RETICS/RBC NFR AUTO: 0.7 % (ref 0.5–2.5)
SATURATED IRON: ABNORMAL % (ref 20–50)
SODIUM SERPL-SCNC: 135 MMOL/L (ref 136–145)
SPECIMEN OUTDATE: NORMAL
TOTAL IRON BINDING CAPACITY: 244 UG/DL (ref 250–450)
TRANSFERRIN SERPL-MCNC: 165 MG/DL (ref 200–375)
VIT B12 SERPL-MCNC: 628 PG/ML (ref 210–950)
WBC # BLD AUTO: 3.8 K/UL (ref 3.9–12.7)

## 2023-04-22 PROCEDURE — 80053 COMPREHEN METABOLIC PANEL: CPT | Mod: HCNC | Performed by: NURSE PRACTITIONER

## 2023-04-22 PROCEDURE — 86900 BLOOD TYPING SEROLOGIC ABO: CPT | Mod: HCNC | Performed by: NURSE PRACTITIONER

## 2023-04-22 PROCEDURE — 99900035 HC TECH TIME PER 15 MIN (STAT): Mod: HCNC

## 2023-04-22 PROCEDURE — 84100 ASSAY OF PHOSPHORUS: CPT | Mod: HCNC | Performed by: NURSE PRACTITIONER

## 2023-04-22 PROCEDURE — 25000242 PHARM REV CODE 250 ALT 637 W/ HCPCS: Mod: HCNC | Performed by: NURSE PRACTITIONER

## 2023-04-22 PROCEDURE — 86140 C-REACTIVE PROTEIN: CPT | Mod: HCNC | Performed by: NURSE PRACTITIONER

## 2023-04-22 PROCEDURE — 94640 AIRWAY INHALATION TREATMENT: CPT | Mod: HCNC

## 2023-04-22 PROCEDURE — 82607 VITAMIN B-12: CPT | Mod: HCNC | Performed by: NURSE PRACTITIONER

## 2023-04-22 PROCEDURE — 96372 THER/PROPH/DIAG INJ SC/IM: CPT | Performed by: NURSE PRACTITIONER

## 2023-04-22 PROCEDURE — 82746 ASSAY OF FOLIC ACID SERUM: CPT | Mod: HCNC | Performed by: NURSE PRACTITIONER

## 2023-04-22 PROCEDURE — 85610 PROTHROMBIN TIME: CPT | Mod: HCNC | Performed by: NURSE PRACTITIONER

## 2023-04-22 PROCEDURE — 85045 AUTOMATED RETICULOCYTE COUNT: CPT | Mod: HCNC | Performed by: NURSE PRACTITIONER

## 2023-04-22 PROCEDURE — 63600175 PHARM REV CODE 636 W HCPCS: Mod: HCNC | Performed by: STUDENT IN AN ORGANIZED HEALTH CARE EDUCATION/TRAINING PROGRAM

## 2023-04-22 PROCEDURE — 85652 RBC SED RATE AUTOMATED: CPT | Mod: HCNC | Performed by: NURSE PRACTITIONER

## 2023-04-22 PROCEDURE — 63600175 PHARM REV CODE 636 W HCPCS: Mod: HCNC | Performed by: NURSE PRACTITIONER

## 2023-04-22 PROCEDURE — 85025 COMPLETE CBC W/AUTO DIFF WBC: CPT | Mod: HCNC | Performed by: NURSE PRACTITIONER

## 2023-04-22 PROCEDURE — 25000003 PHARM REV CODE 250: Mod: HCNC | Performed by: STUDENT IN AN ORGANIZED HEALTH CARE EDUCATION/TRAINING PROGRAM

## 2023-04-22 PROCEDURE — 99223 PR INITIAL HOSPITAL CARE,LEVL III: ICD-10-PCS | Mod: HCNC,,, | Performed by: INTERNAL MEDICINE

## 2023-04-22 PROCEDURE — 82728 ASSAY OF FERRITIN: CPT | Mod: HCNC | Performed by: NURSE PRACTITIONER

## 2023-04-22 PROCEDURE — 99223 1ST HOSP IP/OBS HIGH 75: CPT | Mod: HCNC,,, | Performed by: INTERNAL MEDICINE

## 2023-04-22 PROCEDURE — G0378 HOSPITAL OBSERVATION PER HR: HCPCS | Mod: HCNC

## 2023-04-22 PROCEDURE — 96375 TX/PRO/DX INJ NEW DRUG ADDON: CPT

## 2023-04-22 PROCEDURE — 84466 ASSAY OF TRANSFERRIN: CPT | Mod: HCNC | Performed by: NURSE PRACTITIONER

## 2023-04-22 PROCEDURE — 83735 ASSAY OF MAGNESIUM: CPT | Mod: HCNC | Performed by: NURSE PRACTITIONER

## 2023-04-22 PROCEDURE — 96366 THER/PROPH/DIAG IV INF ADDON: CPT

## 2023-04-22 PROCEDURE — 25000003 PHARM REV CODE 250: Mod: HCNC | Performed by: NURSE PRACTITIONER

## 2023-04-22 PROCEDURE — 36415 COLL VENOUS BLD VENIPUNCTURE: CPT | Mod: HCNC | Performed by: NURSE PRACTITIONER

## 2023-04-22 RX ORDER — FUROSEMIDE 10 MG/ML
20 INJECTION INTRAMUSCULAR; INTRAVENOUS ONCE
Status: COMPLETED | OUTPATIENT
Start: 2023-04-22 | End: 2023-04-22

## 2023-04-22 RX ADMIN — PIPERACILLIN AND TAZOBACTAM 4.5 G: 4; .5 INJECTION, POWDER, LYOPHILIZED, FOR SOLUTION INTRAVENOUS; PARENTERAL at 12:04

## 2023-04-22 RX ADMIN — MICONAZOLE NITRATE: 20 POWDER TOPICAL at 08:04

## 2023-04-22 RX ADMIN — PIPERACILLIN AND TAZOBACTAM 4.5 G: 4; .5 INJECTION, POWDER, LYOPHILIZED, FOR SOLUTION INTRAVENOUS; PARENTERAL at 10:04

## 2023-04-22 RX ADMIN — DIPHENHYDRAMINE HYDROCHLORIDE, ZINC ACETATE: 2; .1 CREAM TOPICAL at 02:04

## 2023-04-22 RX ADMIN — DIPHENHYDRAMINE HYDROCHLORIDE, ZINC ACETATE: 2; .1 CREAM TOPICAL at 09:04

## 2023-04-22 RX ADMIN — ACETAMINOPHEN 1000 MG: 500 TABLET ORAL at 05:04

## 2023-04-22 RX ADMIN — FUROSEMIDE 20 MG: 10 INJECTION, SOLUTION INTRAMUSCULAR; INTRAVENOUS at 12:04

## 2023-04-22 RX ADMIN — IPRATROPIUM BROMIDE AND ALBUTEROL SULFATE 3 ML: .5; 3 SOLUTION RESPIRATORY (INHALATION) at 11:04

## 2023-04-22 RX ADMIN — FUROSEMIDE 20 MG: 20 TABLET ORAL at 09:04

## 2023-04-22 RX ADMIN — ENOXAPARIN SODIUM 40 MG: 40 INJECTION SUBCUTANEOUS at 08:04

## 2023-04-22 RX ADMIN — ASPIRIN 81 MG CHEWABLE TABLET 81 MG: 81 TABLET CHEWABLE at 09:04

## 2023-04-22 RX ADMIN — FLUCONAZOLE 200 MG: 200 TABLET ORAL at 09:04

## 2023-04-22 RX ADMIN — PIPERACILLIN AND TAZOBACTAM 4.5 G: 4; .5 INJECTION, POWDER, LYOPHILIZED, FOR SOLUTION INTRAVENOUS; PARENTERAL at 06:04

## 2023-04-22 RX ADMIN — DIPHENHYDRAMINE HYDROCHLORIDE, ZINC ACETATE: 2; .1 CREAM TOPICAL at 08:04

## 2023-04-22 RX ADMIN — VANCOMYCIN HYDROCHLORIDE 1000 MG: 1 INJECTION, POWDER, LYOPHILIZED, FOR SOLUTION INTRAVENOUS at 04:04

## 2023-04-22 RX ADMIN — MICONAZOLE NITRATE: 20 POWDER TOPICAL at 10:04

## 2023-04-22 NOTE — PLAN OF CARE
04/22/23 0527   Admission   Initial VN Admission Questions Complete   Communication Issues? Patient Hearing  (Phone admit with assistance from Paul ( Granddaughter ))   Shift   Virtual Nurse - Rounding Complete   Pain Management Interventions quiet environment facilitated;relaxation techniques promoted   Virtual Nurse - Patient Verbalized Approval Of VN Rounding;Camera Use   Virtual Nurse - Patient Declined VN Rounding;Camera Use   Type of Frequent Check   Type Patient Rounds;Telemetry Monitoring   Safety/Activity   Patient Rounds bed in low position;bed wheels locked;call light in patient/parent reach;clutter free environment maintained;ID band on;visualized patient;placement of personal items at bedside   Safety Promotion/Fall Prevention assistive device/personal item within reach;bed alarm set;instructed to call staff for mobility;side rails raised x 2;nonskid shoes/socks when out of bed;Fall Risk reviewed with patient/family;lighting adjusted;medications reviewed;room near unit station;/camera at bedside   Safety Precautions emergency equipment at bedside   Activity Management Ambulated to bathroom - L4   Positioning   Body Position position changed independently   Head of Bed (HOB) Positioning HOB elevated    VN spoke with Roxie to complete admit assessment. VIP model introduced; VN working alongside bedside treatment team.  Plan of care reviewed with roxie while pt. Was asleep . Patient informed of fall risk, fall precautions, call light within reach, side rails x2 elevated. Patient notified to ask staff for assistance.Family  verbalized complete understanding. Time allowed for questions. Will continue to monitor and intervene as needed.

## 2023-04-22 NOTE — ASSESSMENT & PLAN NOTE
Patient with Persistent (7 days or more) atrial fibrillation which is controlled currently with Calcium Channel Blocker. Patient is currently in atrial fibrillation.[id number] Score: 4. HASBLED Score:  Anticoagulation not on anticoagulation due to watchman device    Normally rate controlled   afib rvr in the ED   IV cardizem given, BB on allergy list   6 beat run of v-tach overnight, pt asymptomatic    Cardiology consulted

## 2023-04-22 NOTE — ASSESSMENT & PLAN NOTE
Patient is identified as having Diastolic (HFpEF) heart failure that is Chronic. CHF is currently uncontrolled. Latest ECHO performed and demonstrates- Results for orders placed during the hospital encounter of 10/07/22    Echo    Interpretation Summary  · The left ventricle is normal in size with concentric remodeling and normal systolic function.  · The estimated ejection fraction is 55-60%.  · Grade III left ventricular diastolic dysfunction.  · Mild mitral regurgitation.  · Normal right ventricular size with mildly reduced right ventricular systolic function.  · Severe tricuspid regurgitation.  · The estimated PA systolic pressure is 52 mmHg. PASP may be under-estimated due to TR severity.  · Elevated central venous pressure (15 mmHg).  · There is pulmonary hypertension.  · Severe left atrial enlargement.  . Continue Furosemide and monitor clinical status closely. Monitor on telemetry. Patient is off CHF pathway.  Monitor strict Is&Os and daily weights.  Place on fluid restriction of 1.5 L. Continue to stress to patient importance of self efficacy and  on diet for CHF. Last BNP reviewed- and noted below   Recent Labs   Lab 04/21/23  1132   *   .    S/p sepsis IVF. Appears volume overloaded - lasix 20 mg IV x1 today then may continue lasix at home dose PO   Repeat 2d echo  Strict I&O    Low na diet   Following cardiology recs

## 2023-04-22 NOTE — PLAN OF CARE
04/21/23 2237   Patient Request   Patient Requested Proactive Round   Nurse Notification   Charge Nurse Notified? Yes   Name of Charge Nurse Mallorie RN , Georgie Bal   Bedside Nurse Notified? Yes   Name of Bedside Nurse Sabrina Drew RN   Nurse Notfication Method Secure Chat;Telephone   Nurse Notified Of Patient Request  ([id number])

## 2023-04-22 NOTE — NURSING
Dr. Perez informed that the patient had a 6 beat run of v-tach, pt asymptomatic, VSS 98.6,73,97% on 2L NC, 19, 107/48(69). Cards consult in. Will continue to monitor as ordered.

## 2023-04-22 NOTE — PLAN OF CARE
04/21/23 7789   Patient Request   Patient Requested Called Paul SUTHERLAND , left  to call at earliest convenience to complete assessment .   Nurse Notification   Charge Nurse Notified? Yes   Name of Charge Nurse Mallorie SHARMA   Bedside Nurse Notified? Yes   Name of Bedside Nurse Sabrina Drew RN   Nurse Notfication Method Secure Chat;Telephone   Nurse Notified Of Patient Request   Admission   Initial VN Admission Questions Incomplete   Communication Issues?   (Notified by Charge nurse Family member in room but no answer went this VN cued in asked permission to reposition camera to complete questions x2.)    Notified by Mallorie SHARMA . ENA will return in am to answer questions .   Patients INR today in 3.0  Last INR was 3.1 on 9/24/2021    Patient currently taking 5 mg everyday. Gerard is having a procedure today and his INR needed to be within 2.7-3.5 per Dermatologist. Per Shadi, we need to find out if his Dermatologist will ok him to restart his Warfarin tonight and/or what his range needs to be after the procedure. Shayy will be calling us back after his procedure to give us this information. The procure he is having done is a mole removal off the back side of his head. He will also be having a couple more biopsys taken next month.

## 2023-04-22 NOTE — CONSULTS
[facility]  Cardiology  Consult Note    Patient Name: Jenniffer Jimenez  MRN: [id number]  Admission Date: 2023  Hospital Length of Stay: 0 days  Code Status: Full Code   Attending Provider: Christy Iniguez MD   Consulting Provider: Joe Casas MD  Primary Care Physician: Rubi Young DO  Principal Problem:Sepsis    Patient information was obtained from patient and ER records.     Consults  Subjective:     Chief Complaint:  SOB, LE edema     HPI:   89 y/o female who presented with worsening SOB and LE edema. She has a hx of Afib no AC s/p LAAO device (Watchman), HFpEF, HLD, DM, HTN, TIA. Has been having issues with LE cellulitis and volume status. In ED afib with RVR and given Dilt IV x 1 with improvement. HR ON 's. Fever 101.8. Tele with afib with 6 beat NSVT. Last 2DE with EF 55%. Arterial doppler reviewed.     Past Medical History:   Diagnosis Date    Amblyopia of left eye 04/10/2013    Arthritis     Facet arthropathy, Lumbosacral    Atherosclerosis of aorta     Cataract     Central retinal vein occlusion of left eye     CKD (chronic kidney disease) stage 3, GFR 30-59 ml/min     Diabetes mellitus, type 2     Diabetic polyneuropathy 2022    Essential (primary) hypertension     Exotropia of both eyes 2013    recession RSR 5.0mm w/ adj; recession LR os 5.0 w/ adj; resect MR os  4.0mm    Hearing loss     History of resection of small bowel     Hypertensive retinopathy of both eyes     Hypoglycemia     Macular degeneration     OA (osteoarthritis) of shoulder     Right    Osteoporosis     Paroxysmal atrial fibrillation     Posterior vitreous detachment of both eyes     Psychiatric problem     Rhinitis     TIA (transient ischemic attack)        Past Surgical History:   Procedure Laterality Date    APPENDECTOMY      CARDIAC CATHETERIZATION      CATARACT EXTRACTION W/  INTRAOCULAR LENS IMPLANT Bilateral      SECTION, CLASSIC      CLOSURE OF LEFT ATRIAL APPENDAGE USING DEVICE  "N/A 11/4/2020    Procedure: Left atrial appendage closure device;  Surgeon: Abundio Curtis MD;  Location: [facility] CATH LAB;  Service: Cardiology;  Laterality: N/A;    HYSTERECTOMY      INNER EAR SURGERY      JOINT REPLACEMENT      LEFT KNEE REPLACEMENT IN 2013 -    OOPHORECTOMY      SINUS SURGERY      STRABISMUS SURGERY  2/6/13    RSR recession 5 mm, LLR recession 5 mm and LMR resection 4mm    STRABISMUS SURGERY  03/19/2014    recess LR OD 6mm    TONSILLECTOMY      watchman surgery N/A 11/2020       Review of patient's allergies indicates:   Allergen Reactions    Opioids - morphine analogues Other (See Comments)     Bowel issues; bowel obstruction    Tizanidine Other (See Comments)     "Lips were numb,  Almost passed out."    Tramadol Hallucinations    Beta-blockers (beta-adrenergic blocking agts) Other (See Comments)     Can not go on beta blockers for long period of time - due to taking allergy injections    Morphine     Opioids-meperidine and related     Ciprofloxacin Rash       No current facility-administered medications on file prior to encounter.     Current Outpatient Medications on File Prior to Encounter   Medication Sig    acetaminophen (TYLENOL) 500 MG tablet Take 1,000 mg by mouth 2 (two) times a day.    aspirin (ECOTRIN) 81 MG EC tablet Take 1 tablet (81 mg total) by mouth once daily.    diclofenac sodium (VOLTAREN) 1 % Gel Apply 2 g topically 4 (four) times daily. Apply to right hip    furosemide (LASIX) 20 MG tablet Take 1 tablet (20 mg total) by mouth once daily. Hold for weights 150 lb or less, if weight is 155 or over, take 2 tablets in the morning    nystatin (MYCOSTATIN) powder Apply topically 2 (two) times daily.    pravastatin (PRAVACHOL) 40 MG tablet Take 1 tablet (40 mg total) by mouth once daily.    propylene glycol (SYSTANE COMPLETE OPHT) Apply to eye.    silver sulfADIAZINE 1% (SILVADENE) 1 % cream Apply to RLE skin breakdown twice daily (Patient taking differently: Apply topically 2 " (two) times daily. Apply to RLE skin breakdown twice daily)    vitamin E 400 UNIT capsule Take 400 Units by mouth once daily.    QUEtiapine (SEROQUEL) 25 MG Tab Take 1 tablet (25 mg total) by mouth every evening. (Patient not taking: Reported on 2023)    sodium zirconium cyclosilicate (LOKELMA) 5 gram packet Take 1 packet (5 g total) by mouth 2 (two) times a day. Mix entire contents of packet(s) into drinking glass containing 3 tablespoons of water; stir well and drink immediately. Add water and repeat until no powder remains to receive entire dose. (Patient not taking: Reported on 2023)     Family History       Problem Relation (Age of Onset)    Breast cancer Maternal Aunt    Diabetes Sister, Brother    Heart disease Sister, Sister    Hypertension Mother, Father    Liver disease Sister    No Known Problems Maternal Uncle, Paternal Aunt, Paternal Uncle, Maternal Grandmother, Maternal Grandfather, Paternal Grandmother, Paternal Grandfather, Daughter, Son, Son, Son          Tobacco Use    Smoking status: Former     Types: Cigarettes     Quit date: 10/29/1982     Years since quittin.5     Passive exposure: Never    Smokeless tobacco: Never   Substance and Sexual Activity    Alcohol use: Not Currently     Alcohol/week: 2.0 standard drinks     Types: 2 Shots of liquor per week     Comment: rare    Drug use: No    Sexual activity: Never     Review of Systems   Constitutional: Positive for fever and malaise/fatigue.   HENT:  Negative for congestion.    Eyes:  Negative for blurred vision.   Cardiovascular:  Positive for dyspnea on exertion and leg swelling. Negative for chest pain, claudication, cyanosis, irregular heartbeat, near-syncope, orthopnea, palpitations, paroxysmal nocturnal dyspnea and syncope.   Respiratory:  Negative for shortness of breath.    Endocrine: Negative for polyuria.   Hematologic/Lymphatic: Negative for bleeding problem.   Skin:  Positive for poor wound healing. Negative for itching  and rash.   Musculoskeletal:  Positive for muscle weakness. Negative for joint swelling and muscle cramps.   Gastrointestinal:  Negative for abdominal pain, hematemesis, hematochezia, melena, nausea and vomiting.   Genitourinary:  Negative for dysuria and hematuria.   Neurological:  Positive for weakness. Negative for dizziness, focal weakness, headaches, light-headedness and loss of balance.   Psychiatric/Behavioral:  Negative for depression. The patient is not nervous/anxious.    Objective:     Vital Signs (Most Recent):  Temp: 98 °F (36.7 °C) (04/22/23 0748)  Pulse: 83 (04/22/23 0748)  Resp: 20 (04/22/23 0748)  BP: 126/64 (04/22/23 0748)  SpO2: 96 % (04/22/23 0748) Vital Signs (24h Range):  Temp:  [98 °F (36.7 °C)-101.8 °F (38.8 °C)] 98 °F (36.7 °C)  Pulse:  [] 83  Resp:  [18-27] 20  SpO2:  [92 %-100 %] 96 %  BP: ()/(45-69) 126/64     Weight: 70.8 kg (156 lb)  Body mass index is 25.96 kg/m².    SpO2: 96 %         Intake/Output Summary (Last 24 hours) at 4/22/2023 1015  Last data filed at 4/21/2023 1418  Gross per 24 hour   Intake --   Output 250 ml   Net -250 ml       Lines/Drains/Airways       Peripheral Intravenous Line  Duration                  Peripheral IV - Single Lumen 04/21/23 1131 24 G Anterior;Distal;Right Forearm <1 day         Peripheral IV - Single Lumen 04/21/23 1515 20 G Left Antecubital <1 day                    Physical Exam  Constitutional:       Appearance: She is well-developed.   HENT:      Head: Normocephalic and atraumatic.   Neck:      Vascular: No JVD.   Cardiovascular:      Rate and Rhythm: Normal rate. Rhythm irregularly irregular.      Pulses:           Carotid pulses are 2+ on the right side and 2+ on the left side.       Radial pulses are 2+ on the right side and 2+ on the left side.        Femoral pulses are 2+ on the right side and 2+ on the left side.     Heart sounds: Normal heart sounds.   Pulmonary:      Effort: Pulmonary effort is normal.      Breath sounds:  Normal breath sounds.   Abdominal:      General: Bowel sounds are normal.      Palpations: Abdomen is soft.   Musculoskeletal:      Cervical back: Neck supple.      Right lower leg: Edema present.      Left lower leg: Edema present.   Skin:     General: Skin is warm and dry.      Findings: Lesion present.   Neurological:      Mental Status: She is alert and oriented to person, place, and time.   Psychiatric:         Behavior: Behavior normal.         Thought Content: Thought content normal.       Significant Labs:   Recent Lab Results  (Last 5 results in the past 24 hours)        04/22/23  0417   04/22/23  0349   04/21/23  2337   04/21/23  1723   04/21/23  1457        Albumin   2.2             Alkaline Phosphatase   86             ALT   8             Anion Gap   10             AST   11             Baso #   0.01             Basophil %   0.3             BILIRUBIN TOTAL   0.3  Comment: For infants and newborns, interpretation of results should be based  on gestational age, weight and in agreement with clinical  observations.    Premature Infant recommended reference ranges:  Up to 24 hours.............<8.0 mg/dL  Up to 48 hours............<12.0 mg/dL  3-5 days..................<15.0 mg/dL  6-29 days.................<15.0 mg/dL               BUN   27             Calcium   7.6             Chloride   104             CO2   21             Creatinine   1.2             Differential Method   Automated             eGFR   43             Eos #   0.0             Eosinophil %   0.5             Glucose   107             Gran # (ANC)   2.5             Gran %   64.7             Hematocrit   22.6             Hemoglobin   7.3             Immature Grans (Abs)   0.03  Comment: Mild elevation in immature granulocytes is non specific and   can be seen in a variety of conditions including stress response,   acute inflammation, trauma and pregnancy. Correlation with other   laboratory and clinical findings is essential.               Immature  Granulocytes   0.8             INR   1.1  Comment: Coumadin Therapy:  2.0 - 3.0 for INR for all indicators except mechanical heart valves  and antiphospholipid syndromes which should use 2.5 - 3.5.  LOT^040^PT Inn^[id number]               Lactate, Jose         2.2  Comment: Falsely low lactic acid results can be found in samples   containing >=13.0 mg/dL total bilirubin and/or >=3.5 mg/dL   direct bilirubin.         Lymph #   0.7             Lymph %   18.4             Magnesium   2.0             MCH   27.8             MCHC   32.3             MCV   86             Mono #   0.6             Mono %   15.3             MPV   10.3             nRBC   0             Phosphorus   3.7             Platelets   161             POCT Glucose 113     175   148         Potassium   4.1             PROTEIN TOTAL   5.3             Protime   11.2             RBC   2.63             RDW   17.9             Sodium   135             WBC   3.80                                  Assessment and Plan:     Active Diagnoses:    Diagnosis Date Noted POA    PRINCIPAL PROBLEM:  Sepsis [A41.9] 04/21/2023 Unknown    Venous insufficiency of both lower extremities [I87.2] 11/22/2019 Yes    Prediabetes [R73.03] 03/31/2017 Yes    Stage 3b chronic kidney disease [N18.32] 05/04/2016 Yes    Chronic diastolic heart failure [I50.32] 05/04/2016 Yes     Chronic    History of TIA (transient ischemic attack) [Z86.73] 05/04/2016 Not Applicable    Chronic atrial fibrillation [I48.20] 01/29/2016 Yes     Chronic    Primary hypertension [I10]  Yes     Chronic    Pure hypercholesterolemia [E78.00] 07/02/2012 Yes     Chronic      Problems Resolved During this Admission:     HFpEF  -appears acutely decompensated - has been drinking a lot of gatorade at home  -Recommend dose of IV lasix today  -Unclear why BB listed as an allergy - defer to outpt  -check 2DE    Afib  -currently rate controlled and BP stable  -Can use CCB (PO dilt) if rate control needed in light of BB  ""allergy"    Cellulitis  -+fever  -LE edema/wounds  -Aggressive wound care  -management per primary  -check RLE venous doppler    Anemia  -worsening   -anemia w/u  -hold AC for now          VTE Risk Mitigation (From admission, onward)           Ordered     enoxaparin injection 40 mg  Daily         04/21/23 1507     IP VTE HIGH RISK PATIENT  Once         04/21/23 1507     Place sequential compression device  Until discontinued         04/21/23 1507                    Thank you for your consult. I will follow-up with patient. Please contact us if you have any additional questions.    Joe Casas MD  Cardiology   [place] - Med Surg        "

## 2023-04-22 NOTE — ASSESSMENT & PLAN NOTE
This patient does have evidence of infective focus  My overall impression is sepsis.  Source: Skin and Soft Tissue (location RLE)  Antibiotics given-   Antibiotics (72h ago, onward)    Start     Stop Route Frequency Ordered    04/22/23 1600  vancomycin (VANCOCIN) 1,000 mg in dextrose 5 % (D5W) 250 mL IVPB (Vial-Mate)         -- IV Every 24 hours (non-standard times) 04/22/23 0958    04/21/23 2330  piperacillin-tazobactam (ZOSYN) 4.5 g in dextrose 5 % in water (D5W) 5 % 100 mL IVPB (MB+)         -- IV Every 8 hours (non-standard times) 04/21/23 1505    04/21/23 1600  vancomycin - pharmacy to dose  (vancomycin IVPB)        See Hyperspace for full Linked Orders Report.    -- IV pharmacy to manage frequency 04/21/23 1505        Latest lactate reviewed-  Recent Labs   Lab 04/21/23  1132 04/21/23  1457   LACTATE 1.5 2.2     Organ dysfunction indicated by no organ dysfunction     Fluid challenge Actual Body weight- Patient will receive 30ml/kg actual body weight to calculate fluid bolus for treatment of septic shock.     Post- resuscitation assessment Yes Perfusion exam was performed within 6 hours of septic shock presentation after bolus shows Adequate tissue perfusion assessed by non-invasive monitoring       Will Not start Pressors- Levophed for MAP of 65  Source control achieved by: IV vancomycin and zosyn     Wound care   PRN tylenol for pain or fever. Patient has multiple allergies  Diflucan and topical tx for yeast   Blood cx pending NGTD    ESR, CRP, xray RLE to r/o osteo with chronic wound

## 2023-04-22 NOTE — PLAN OF CARE
Problem: Adult Inpatient Plan of Care  Goal: Plan of Care Review  Outcome: Ongoing, Progressing  Flowsheets (Taken 4/22/2023 0507)  Plan of Care Reviewed With: patient     Problem: Diabetes Comorbidity  Goal: Blood Glucose Level Within Targeted Range  Outcome: Ongoing, Progressing  Intervention: Monitor and Manage Glycemia  Flowsheets (Taken 4/22/2023 0507)  Glycemic Management: blood glucose monitored     Problem: Impaired Wound Healing  Goal: Optimal Wound Healing  Outcome: Ongoing, Progressing  Intervention: Promote Wound Healing  Flowsheets (Taken 4/22/2023 0507)  Sleep/Rest Enhancement:   awakenings minimized   noise level reduced   room darkened  Activity Management: Up to bedside commode - L3  Pain Management Interventions:   care clustered   diversional activity provided   pain management plan reviewed with patient/caregiver   pillow support provided   quiet environment facilitated

## 2023-04-22 NOTE — PROGRESS NOTES
[facility]  Progress Note    Patient Name: Jenniffer Jimenez  MRN: [id number]  Patient Class: OP- Observation   Admission Date: 4/21/2023  Length of Stay: 0 days  Attending Physician: Chirsty Iniguez MD  Primary Care Provider: Rubi Young DO        Subjective:     Principal Problem:Sepsis        HPI:  89 yo female with a PMH of cataract, [place], CKD, DM II, diabetic polyneuropathy, HTN, PAF, TIA presents to the ED For evaluation of fever noted by home health nurse. RLE appears edematous, erythematous, weeping worse for unknown duration. She also has a rash to her perineal area and back. She notes pain to the RLE. Since in the ED she was noted to be in afib RVR, which is now rate controlled with a dose of IV cardizem. Patient is a poor historian, yelling, hx of psychiatric problem.     I tried to phone POA for further hx- no answer.     Current OP medications as reviewed per recent cardiology note:       acetaminophen (TYLENOL) 500 MG tablet, Take 1,000 mg by mouth 2 (two) times a day., Disp: , Rfl:     aspirin (ECOTRIN) 81 MG EC tablet, Take 1 tablet (81 mg total) by mouth once daily., Disp: 90 tablet, Rfl: 3    diclofenac sodium (VOLTAREN) 1 % Gel, Apply 2 g topically 4 (four) times daily. Use gloves to apply to right hip for 10 days, Disp: 100 g, Rfl: 1    miconazole NITRATE 2 % (MICOTIN) 2 % top powder, Apply topically 2 (two) times daily. To RLE., Disp: , Rfl: 0    nystatin (MYCOSTATIN) powder, Apply topically 2 (two) times daily., Disp: 30 g, Rfl: 1    pravastatin (PRAVACHOL) 40 MG tablet, Take 1 tablet (40 mg total) by mouth once daily., Disp: 90 tablet, Rfl: 3    silver sulfADIAZINE 1% (SILVADENE) 1 % cream, Apply to RLE skin breakdown twice daily, Disp: , Rfl:     triamcinolone acetonide 0.5% (KENALOG) 0.5 % Crea, Apply to legs with dressing changes., Disp: 454 g, Rfl: 0    vit C/E/Zn/coppr/lutein/zeaxan (OCUVITE LUTEIN AND ZEAXANTHIN ORAL), Take 1 capsule by mouth once  daily. Lutien 25 mg, Zeaxanthin 5 mg, Disp: , Rfl:     vitamin E 400 UNIT capsule, Take 400 Units by mouth once daily., Disp: , Rfl:     furosemide (LASIX) 20 MG tablet, Take 1 tablet (20 mg total) by mouth once daily. Hold for weights 150 lb or less, Disp: 30 tablet, Rfl: 11    QUEtiapine (SEROQUEL) 25 MG Tab, Take 1 tablet (25 mg total) by mouth every evening. (Patient not taking: Reported on 4/12/2023), Disp: 30 tablet, Rfl: 11    sodium zirconium cyclosilicate (LOKELMA) 5 gram packet, Take 1 packet (5 g total) by mouth 2 (two) times a day. Mix entire contents of packet(s) into drinking glass containing 3 tablespoons of water; stir well and drink immediately. Add water and repeat until no powder remains to receive entire dose., Disp: 60 packet, Rfl: 3       Overview/Hospital Course:  No notes on file    Interval hx: this am patient is more calm and cooperative.She has some SOB with a dry cough. She has pain to RLE. She denies bleeding aside from minimal bleed to RLE wound. Further complaints not noted. Over night 6 beat run of v-tach, pt asymptomatic      Objective:     Vital Signs (Most Recent):  Temp: 97.6 °F (36.4 °C) (04/22/23 1123)  Pulse: 74 (04/22/23 1123)  Resp: 20 (04/22/23 1123)  BP: (!) 118/58 (04/22/23 1123)  SpO2: 100 % (04/22/23 1123)   Vital Signs (24h Range):  Temp:  [97.6 °F (36.4 °C)-100.5 °F (38.1 °C)] 97.6 °F (36.4 °C)  Pulse:  [] 74  Resp:  [18-27] 20  SpO2:  [96 %-100 %] 100 %  BP: ()/(45-64) 118/58     Weight: 70.8 kg (156 lb)  Body mass index is 25.96 kg/m².    Physical Exam  Constitutional:       Appearance: She is ill-appearing.   HENT:      Head: Normocephalic and atraumatic.      Mouth/Throat:      Pharynx: Oropharynx is clear.   Eyes:      Conjunctiva/sclera: Conjunctivae normal.   Cardiovascular:      Rate and Rhythm: Tachycardia present. Rhythm irregular.      Pulses: Normal pulses.      Heart sounds: Normal heart sounds.   Pulmonary:      Effort: Pulmonary effort is  normal.      Comments: Mildly coarse   Abdominal:      General: Abdomen is flat. Bowel sounds are normal.      Palpations: Abdomen is soft.   Musculoskeletal:         General: Swelling and tenderness present.      Cervical back: Normal range of motion and neck supple.   Skin:     General: Skin is warm and dry.      Comments: RLE edematous, erythematous, weeping, tender, warm  Palpable pulse     Noted yeast type rash to perineal area while she is sitting on BS commode     Erythematous rash to back    Neurological:      General: No focal deficit present.      Mental Status: She is alert.   Psychiatric:         Mood and Affect: Mood is not anxious.         Behavior: Behavior is not agitated.           Significant Labs: All pertinent labs within the past 24 hours have been reviewed.    Significant Imaging: I have reviewed all pertinent imaging results/findings within the past 24 hours.      Assessment/Plan:      * Sepsis  This patient does have evidence of infective focus  My overall impression is sepsis.  Source: Skin and Soft Tissue (location RLE)  Antibiotics given-   Antibiotics (72h ago, onward)    Start     Stop Route Frequency Ordered    04/22/23 1600  vancomycin (VANCOCIN) 1,000 mg in dextrose 5 % (D5W) 250 mL IVPB (Vial-Mate)         -- IV Every 24 hours (non-standard times) 04/22/23 0958    04/21/23 2330  piperacillin-tazobactam (ZOSYN) 4.5 g in dextrose 5 % in water (D5W) 5 % 100 mL IVPB (MB+)         -- IV Every 8 hours (non-standard times) 04/21/23 1505    04/21/23 1600  vancomycin - pharmacy to dose  (vancomycin IVPB)        See Hyperspace for full Linked Orders Report.    -- IV pharmacy to manage frequency 04/21/23 1505        Latest lactate reviewed-  Recent Labs   Lab 04/21/23  1132 04/21/23  1457   LACTATE 1.5 2.2     Organ dysfunction indicated by no organ dysfunction     Fluid challenge Actual Body weight- Patient will receive 30ml/kg actual body weight to calculate fluid bolus for treatment of  septic shock.     Post- resuscitation assessment Yes Perfusion exam was performed within 6 hours of septic shock presentation after bolus shows Adequate tissue perfusion assessed by non-invasive monitoring       Will Not start Pressors- Levophed for MAP of 65  Source control achieved by: IV vancomycin and zosyn     Wound care   PRN tylenol for pain or fever. Patient has multiple allergies  Diflucan and topical tx for yeast   Blood cx pending NGTD    ESR, CRP, xray RLE to r/o osteo with chronic wound     Microcytic anemia    hgb 9.8--> 7.3  Anemia panel   Stool for OCB   Type and screen   UA without blood   Close monitoring   Transfuse if Hgb < 7 or symptomatic     Venous insufficiency of both lower extremities    Palpable R dorsalis pedal pulse   Will US RLE arterial for further vascular assessment   No evidence of high-grade stenosis of the right lower extremity arteries.  2. Monophasic waveforms in the popliteal artery and calf arteries, likely related to vessel hardening from atherosclerotic disease.    Check venous doppler to RO DVT     Prediabetes    Lab Results   Component Value Date    HGBA1C 6.0 (H) 01/26/2023     Low CSI   Consistent carb diet     History of TIA (transient ischemic attack)    Resume asa  Hold statin while septic     Chronic diastolic heart failure    Patient is identified as having Diastolic (HFpEF) heart failure that is Chronic. CHF is currently uncontrolled. Latest ECHO performed and demonstrates- Results for orders placed during the hospital encounter of 10/07/22    Echo    Interpretation Summary  · The left ventricle is normal in size with concentric remodeling and normal systolic function.  · The estimated ejection fraction is 55-60%.  · Grade III left ventricular diastolic dysfunction.  · Mild mitral regurgitation.  · Normal right ventricular size with mildly reduced right ventricular systolic function.  · Severe tricuspid regurgitation.  · The estimated PA systolic pressure is 52  mmHg. PASP may be under-estimated due to TR severity.  · Elevated central venous pressure (15 mmHg).  · There is pulmonary hypertension.  · Severe left atrial enlargement.  . Continue Furosemide and monitor clinical status closely. Monitor on telemetry. Patient is off CHF pathway.  Monitor strict Is&Os and daily weights.  Place on fluid restriction of 1.5 L. Continue to stress to patient importance of self efficacy and  on diet for CHF. Last BNP reviewed- and noted below   Recent Labs   Lab 04/21/23  1132   *   .    S/p sepsis IVF. Appears volume overloaded - lasix 20 mg IV x1 today then may continue lasix at home dose PO   Repeat 2d echo  Strict I&O    Low na diet   Following cardiology recs     Stage 3b chronic kidney disease    Cr 1.1  Baseline     Chronic atrial fibrillation  Patient with Persistent (7 days or more) atrial fibrillation which is controlled currently with Calcium Channel Blocker. Patient is currently in atrial fibrillation.[id number] Score: 4. HASBLED Score:  Anticoagulation not on anticoagulation due to watchman device    Normally rate controlled   afib rvr in the ED   IV cardizem given, BB on allergy list   6 beat run of v-tach overnight, pt asymptomatic    Cardiology consulted     Primary hypertension    Hold medications while septic     Pure hypercholesterolemia    Hold statin while septic       VTE Risk Mitigation (From admission, onward)         Ordered     enoxaparin injection 40 mg  Daily         04/21/23 1507     IP VTE HIGH RISK PATIENT  Once         04/21/23 1507     Place sequential compression device  Until discontinued         04/21/23 1507                Discharge Planning   GILES:      Code Status: Full Code   Is the patient medically ready for discharge?:     Reason for patient still in hospital (select all that apply): Patient trending condition                     Amna Hernandez NP  Department of Hospital Medicine   [facility]

## 2023-04-22 NOTE — SUBJECTIVE & OBJECTIVE
Interval hx: this am patient is more calm and cooperative.She has some SOB with a dry cough. She has pain to RLE. She denies bleeding aside from minimal bleed to RLE wound. Further complaints not noted. Over night 6 beat run of v-tach, pt asymptomatic      Objective:     Vital Signs (Most Recent):  Temp: 97.6 °F (36.4 °C) (04/22/23 1123)  Pulse: 74 (04/22/23 1123)  Resp: 20 (04/22/23 1123)  BP: (!) 118/58 (04/22/23 1123)  SpO2: 100 % (04/22/23 1123)   Vital Signs (24h Range):  Temp:  [97.6 °F (36.4 °C)-100.5 °F (38.1 °C)] 97.6 °F (36.4 °C)  Pulse:  [] 74  Resp:  [18-27] 20  SpO2:  [96 %-100 %] 100 %  BP: ()/(45-64) 118/58     Weight: 70.8 kg (156 lb)  Body mass index is 25.96 kg/m².    Physical Exam  Constitutional:       Appearance: She is ill-appearing.   HENT:      Head: Normocephalic and atraumatic.      Mouth/Throat:      Pharynx: Oropharynx is clear.   Eyes:      Conjunctiva/sclera: Conjunctivae normal.   Cardiovascular:      Rate and Rhythm: Tachycardia present. Rhythm irregular.      Pulses: Normal pulses.      Heart sounds: Normal heart sounds.   Pulmonary:      Effort: Pulmonary effort is normal.      Comments: Mildly coarse   Abdominal:      General: Abdomen is flat. Bowel sounds are normal.      Palpations: Abdomen is soft.   Musculoskeletal:         General: Swelling and tenderness present.      Cervical back: Normal range of motion and neck supple.   Skin:     General: Skin is warm and dry.      Comments: RLE edematous, erythematous, weeping, tender, warm  Palpable pulse     Noted yeast type rash to perineal area while she is sitting on BS commode     Erythematous rash to back    Neurological:      General: No focal deficit present.      Mental Status: She is alert.   Psychiatric:         Mood and Affect: Mood is not anxious.         Behavior: Behavior is not agitated.           Significant Labs: All pertinent labs within the past 24 hours have been reviewed.    Significant Imaging: I have  reviewed all pertinent imaging results/findings within the past 24 hours.

## 2023-04-22 NOTE — ASSESSMENT & PLAN NOTE
hgb 9.8--> 7.3  Anemia panel   Stool for OCB   Type and screen   UA without blood   Close monitoring   Transfuse if Hgb < 7 or symptomatic

## 2023-04-22 NOTE — ASSESSMENT & PLAN NOTE
Palpable R dorsalis pedal pulse   Will US RLE arterial for further vascular assessment   No evidence of high-grade stenosis of the right lower extremity arteries.  2. Monophasic waveforms in the popliteal artery and calf arteries, likely related to vessel hardening from atherosclerotic disease.    Check venous doppler to RO DVT

## 2023-04-22 NOTE — PLAN OF CARE
AAOx4. C/o pain. PRN pain med given per MAR.  Tolerating cardiac diet. IV ABX.    Blood glucose and cardiac monitoring maintained. Bed locked in lowest position, bed alarm set and call bell within reach.

## 2023-04-22 NOTE — NURSING
"RAPID RESPONSE NURSE PROACTIVE ROUNDING NOTE     Time of Visit: N/A    Admit Date: 2023  LOS: 0  Code Status: Full Code   Date of Visit: 2023  : 1932  Age: 89 y.o.  Sex: female  Race: White  Bed: K521/K521 A:   MRN: [id number]  Was the patient discharged from an ICU this admission? no   Was the patient discharged from a PACU within last 24 hours?  no  Did the patient receive conscious sedation/general anesthesia in last 24 hours?  no  Was the patient in the ED within the past 24 hours?  yes  Was the patient started on NIPPV within the past 24 hours?  no  Attending Physician: Christy Iniguez MD  Primary Service: Networked reference to record PCT     ASSESSMENT     Notified by charge RN during rounding.  Reason for alert: increased oxygen requirements    Diagnosis: Sepsis    Abnormal Vital Signs: BP (!) 113/56   Pulse (!) 113   Temp (!) 100.5 °F (38.1 °C)   Resp 18   Ht 5' 5" (1.651 m)   Wt 70.8 kg (156 lb)   LMP  (LMP Unknown)   SpO2 100%   BMI 25.96 kg/m²      Clinical Issues: Respiratory    Patient  has a past medical history of Amblyopia of left eye, Arthritis, Atherosclerosis of aorta, Cataract, Central retinal vein occlusion of left eye, CKD (chronic kidney disease) stage 3, GFR 30-59 ml/min, Diabetes mellitus, type 2, Diabetic polyneuropathy, Essential (primary) hypertension, Exotropia of both eyes, Hearing loss, History of resection of small bowel, Hypertensive retinopathy of both eyes, Hypoglycemia, Macular degeneration, OA (osteoarthritis) of shoulder, Osteoporosis, Paroxysmal atrial fibrillation, Posterior vitreous detachment of both eyes, Psychiatric problem, Rhinitis, and TIA (transient ischemic attack).      Chart reviewed. Notified about pt arrival from ED by charge RN. Pt presented with SOB. Notable hx of CHF, Afib, HTN. Pt HR elevated in ED, appeared to be A Fib RVR that was treated with 10mg diltiazem IVP. Last documented HR appears controlled at 113, mild tachycardia. Reports " O2 sat in 80s on arrival. Appears pt initially on 2L NC, pt placed on 8L 31% venturi mask by RT. O2 sats improved to 97%. Pt has PRN breathing tx ordered. Additionally, pt febrile at 100.5.      INTERVENTIONS/ RECOMMENDATIONS     Administer tylenol PRN.   Continue to monitor O2 sat, HR and rhythm.   Attempt to wean O2 as tolerated.   Call for any concerns.     Discussed plan of care with charge RNMallorie.    PHYSICIAN ESCALATION     Yes/No  no    Orders received and case discussed with NA.    Disposition: Remain in room 521.    FOLLOW-UP     Call back the Rapid Response Nurse, JAMES CORDOVA, RN at [facility] Phone: [phone] for additional questions or concerns.

## 2023-04-23 PROBLEM — K59.00 CONSTIPATION: Status: ACTIVE | Noted: 2023-04-23

## 2023-04-23 LAB
ALBUMIN SERPL BCP-MCNC: 2.3 G/DL (ref 3.5–5.2)
ALP SERPL-CCNC: 81 U/L (ref 55–135)
ALT SERPL W/O P-5'-P-CCNC: 9 U/L (ref 10–44)
ANION GAP SERPL CALC-SCNC: 10 MMOL/L (ref 8–16)
AORTIC ROOT ANNULUS: 3.45 CM
AST SERPL-CCNC: 11 U/L (ref 10–40)
AV INDEX (PROSTH): 0.77
AV MEAN GRADIENT: 4 MMHG
AV PEAK GRADIENT: 9 MMHG
AV VALVE AREA: 2.18 CM2
AV VELOCITY RATIO: 0.66
BASOPHILS # BLD AUTO: 0.01 K/UL (ref 0–0.2)
BASOPHILS NFR BLD: 0.3 % (ref 0–1.9)
BILIRUB SERPL-MCNC: 0.2 MG/DL (ref 0.1–1)
BSA FOR ECHO PROCEDURE: 1.8 M2
BUN SERPL-MCNC: 26 MG/DL (ref 10–30)
CALCIUM SERPL-MCNC: 7.9 MG/DL (ref 8.7–10.5)
CHLORIDE SERPL-SCNC: 104 MMOL/L (ref 95–110)
CO2 SERPL-SCNC: 23 MMOL/L (ref 23–29)
CREAT SERPL-MCNC: 1.2 MG/DL (ref 0.5–1.4)
CV ECHO LV RWT: 0.53 CM
DIFFERENTIAL METHOD: ABNORMAL
DOP CALC AO PEAK VEL: 1.46 M/S
DOP CALC AO VTI: 27.5 CM
DOP CALC LVOT AREA: 2.8 CM2
DOP CALC LVOT DIAMETER: 1.9 CM
DOP CALC LVOT PEAK VEL: 0.96 M/S
DOP CALC LVOT STROKE VOLUME: 60.08 CM3
DOP CALCLVOT PEAK VEL VTI: 21.2 CM
ECHO LV POSTERIOR WALL: 1.21 CM (ref 0.6–1.1)
EJECTION FRACTION: 55 %
EOSINOPHIL # BLD AUTO: 0 K/UL (ref 0–0.5)
EOSINOPHIL NFR BLD: 1.1 % (ref 0–8)
ERYTHROCYTE [DISTWIDTH] IN BLOOD BY AUTOMATED COUNT: 18.2 % (ref 11.5–14.5)
EST. GFR  (NO RACE VARIABLE): 43 ML/MIN/1.73 M^2
FRACTIONAL SHORTENING: 31 % (ref 28–44)
GLUCOSE SERPL-MCNC: 95 MG/DL (ref 70–110)
HCT VFR BLD AUTO: 24.3 % (ref 37–48.5)
HGB BLD-MCNC: 7.9 G/DL (ref 12–16)
IMM GRANULOCYTES # BLD AUTO: 0.04 K/UL (ref 0–0.04)
IMM GRANULOCYTES NFR BLD AUTO: 1.1 % (ref 0–0.5)
INTERVENTRICULAR SEPTUM: 1.19 CM (ref 0.6–1.1)
IVC DIAMETER: 2.21 CM
IVRT: 65.65 MSEC
LA MAJOR: 6.15 CM
LA MINOR: 5.85 CM
LA WIDTH: 4.2 CM
LEFT ATRIUM SIZE: 4.72 CM
LEFT ATRIUM VOLUME INDEX MOD: 48.7 ML/M2
LEFT ATRIUM VOLUME INDEX: 56.8 ML/M2
LEFT ATRIUM VOLUME MOD: 86.68 CM3
LEFT ATRIUM VOLUME: 101.04 CM3
LEFT INTERNAL DIMENSION IN SYSTOLE: 3.19 CM (ref 2.1–4)
LEFT VENTRICLE DIASTOLIC VOLUME INDEX: 54.31 ML/M2
LEFT VENTRICLE DIASTOLIC VOLUME: 96.67 ML
LEFT VENTRICLE MASS INDEX: 115 G/M2
LEFT VENTRICLE SYSTOLIC VOLUME INDEX: 22.8 ML/M2
LEFT VENTRICLE SYSTOLIC VOLUME: 40.59 ML
LEFT VENTRICULAR INTERNAL DIMENSION IN DIASTOLE: 4.59 CM (ref 3.5–6)
LEFT VENTRICULAR MASS: 204.3 G
LVOT MG: 2.1 MMHG
LVOT MV: 0.69 CM/S
LYMPHOCYTES # BLD AUTO: 0.5 K/UL (ref 1–4.8)
LYMPHOCYTES NFR BLD: 14.9 % (ref 18–48)
MAGNESIUM SERPL-MCNC: 2 MG/DL (ref 1.6–2.6)
MCH RBC QN AUTO: 27.9 PG (ref 27–31)
MCHC RBC AUTO-ENTMCNC: 32.5 G/DL (ref 32–36)
MCV RBC AUTO: 86 FL (ref 82–98)
MONOCYTES # BLD AUTO: 0.4 K/UL (ref 0.3–1)
MONOCYTES NFR BLD: 11.6 % (ref 4–15)
NEUTROPHILS # BLD AUTO: 2.6 K/UL (ref 1.8–7.7)
NEUTROPHILS NFR BLD: 71 % (ref 38–73)
NRBC BLD-RTO: 0 /100 WBC
OHS LV EJECTION FRACTION SIMPSONS BIPLANE MOD: 5 %
PHOSPHATE SERPL-MCNC: 3.6 MG/DL (ref 2.7–4.5)
PISA MRMAX VEL: 3.07 M/S
PISA TR MAX VEL: 3.22 M/S
PLATELET # BLD AUTO: 162 K/UL (ref 150–450)
PMV BLD AUTO: 10.1 FL (ref 9.2–12.9)
POCT GLUCOSE: 119 MG/DL (ref 70–110)
POCT GLUCOSE: 187 MG/DL (ref 70–110)
POCT GLUCOSE: 90 MG/DL (ref 70–110)
POCT GLUCOSE: 97 MG/DL (ref 70–110)
POTASSIUM SERPL-SCNC: 4.2 MMOL/L (ref 3.5–5.1)
PROT SERPL-MCNC: 5.6 G/DL (ref 6–8.4)
PULM VEIN S/D RATIO: 0.49
PV PEAK D VEL: 0.59 M/S
PV PEAK S VEL: 0.29 M/S
RA MAJOR: 6.31 CM
RA PRESSURE: 15 MMHG
RA WIDTH: 4.5 CM
RBC # BLD AUTO: 2.83 M/UL (ref 4–5.4)
RIGHT VENTRICULAR END-DIASTOLIC DIMENSION: 3.14 CM
RV TISSUE DOPPLER FREE WALL SYSTOLIC VELOCITY 1 (APICAL 4 CHAMBER VIEW): 0.01 CM/S
SODIUM SERPL-SCNC: 137 MMOL/L (ref 136–145)
TDI LATERAL: 0.16 M/S
TDI SEPTAL: 0.14 M/S
TDI: 0.15 M/S
TR MAX PG: 41 MMHG
TV REST PULMONARY ARTERY PRESSURE: 56 MMHG
VANCOMYCIN TROUGH SERPL-MCNC: 10.7 UG/ML (ref 10–22)
WBC # BLD AUTO: 3.63 K/UL (ref 3.9–12.7)

## 2023-04-23 PROCEDURE — 85025 COMPLETE CBC W/AUTO DIFF WBC: CPT | Mod: HCNC | Performed by: NURSE PRACTITIONER

## 2023-04-23 PROCEDURE — 97165 OT EVAL LOW COMPLEX 30 MIN: CPT | Mod: HCNC

## 2023-04-23 PROCEDURE — 99233 PR SUBSEQUENT HOSPITAL CARE,LEVL III: ICD-10-PCS | Mod: HCNC,,, | Performed by: INTERNAL MEDICINE

## 2023-04-23 PROCEDURE — 83735 ASSAY OF MAGNESIUM: CPT | Mod: HCNC | Performed by: NURSE PRACTITIONER

## 2023-04-23 PROCEDURE — 80053 COMPREHEN METABOLIC PANEL: CPT | Mod: HCNC | Performed by: NURSE PRACTITIONER

## 2023-04-23 PROCEDURE — 97535 SELF CARE MNGMENT TRAINING: CPT | Mod: HCNC

## 2023-04-23 PROCEDURE — 63600175 PHARM REV CODE 636 W HCPCS: Mod: HCNC | Performed by: STUDENT IN AN ORGANIZED HEALTH CARE EDUCATION/TRAINING PROGRAM

## 2023-04-23 PROCEDURE — 36415 COLL VENOUS BLD VENIPUNCTURE: CPT | Mod: HCNC | Performed by: NURSE PRACTITIONER

## 2023-04-23 PROCEDURE — 25000003 PHARM REV CODE 250: Mod: HCNC | Performed by: NURSE PRACTITIONER

## 2023-04-23 PROCEDURE — 63600175 PHARM REV CODE 636 W HCPCS: Mod: HCNC | Performed by: NURSE PRACTITIONER

## 2023-04-23 PROCEDURE — 94799 UNLISTED PULMONARY SVC/PX: CPT | Mod: HCNC

## 2023-04-23 PROCEDURE — 36415 COLL VENOUS BLD VENIPUNCTURE: CPT | Mod: HCNC | Performed by: STUDENT IN AN ORGANIZED HEALTH CARE EDUCATION/TRAINING PROGRAM

## 2023-04-23 PROCEDURE — 94640 AIRWAY INHALATION TREATMENT: CPT | Mod: HCNC

## 2023-04-23 PROCEDURE — 96376 TX/PRO/DX INJ SAME DRUG ADON: CPT

## 2023-04-23 PROCEDURE — G0378 HOSPITAL OBSERVATION PER HR: HCPCS | Mod: HCNC

## 2023-04-23 PROCEDURE — 96372 THER/PROPH/DIAG INJ SC/IM: CPT | Performed by: STUDENT IN AN ORGANIZED HEALTH CARE EDUCATION/TRAINING PROGRAM

## 2023-04-23 PROCEDURE — 99233 SBSQ HOSP IP/OBS HIGH 50: CPT | Mod: HCNC,,, | Performed by: INTERNAL MEDICINE

## 2023-04-23 PROCEDURE — 94640 AIRWAY INHALATION TREATMENT: CPT | Mod: HCNC,XB

## 2023-04-23 PROCEDURE — 94761 N-INVAS EAR/PLS OXIMETRY MLT: CPT | Mod: HCNC

## 2023-04-23 PROCEDURE — 25000242 PHARM REV CODE 250 ALT 637 W/ HCPCS: Mod: HCNC | Performed by: NURSE PRACTITIONER

## 2023-04-23 PROCEDURE — 27000221 HC OXYGEN, UP TO 24 HOURS: Mod: HCNC

## 2023-04-23 PROCEDURE — 84100 ASSAY OF PHOSPHORUS: CPT | Mod: HCNC | Performed by: NURSE PRACTITIONER

## 2023-04-23 PROCEDURE — 80202 ASSAY OF VANCOMYCIN: CPT | Mod: HCNC | Performed by: STUDENT IN AN ORGANIZED HEALTH CARE EDUCATION/TRAINING PROGRAM

## 2023-04-23 PROCEDURE — 99900035 HC TECH TIME PER 15 MIN (STAT): Mod: HCNC

## 2023-04-23 PROCEDURE — 25000003 PHARM REV CODE 250: Mod: HCNC | Performed by: STUDENT IN AN ORGANIZED HEALTH CARE EDUCATION/TRAINING PROGRAM

## 2023-04-23 PROCEDURE — 96366 THER/PROPH/DIAG IV INF ADDON: CPT

## 2023-04-23 RX ORDER — DOCUSATE SODIUM 100 MG/1
100 CAPSULE, LIQUID FILLED ORAL DAILY
Status: DISCONTINUED | OUTPATIENT
Start: 2023-04-23 | End: 2023-04-26

## 2023-04-23 RX ORDER — FUROSEMIDE 10 MG/ML
20 INJECTION INTRAMUSCULAR; INTRAVENOUS ONCE
Status: COMPLETED | OUTPATIENT
Start: 2023-04-23 | End: 2023-04-23

## 2023-04-23 RX ORDER — GUAIFENESIN 600 MG/1
600 TABLET, EXTENDED RELEASE ORAL 2 TIMES DAILY
Status: DISCONTINUED | OUTPATIENT
Start: 2023-04-23 | End: 2023-04-27 | Stop reason: HOSPADM

## 2023-04-23 RX ORDER — FLUTICASONE PROPIONATE 50 MCG
2 SPRAY, SUSPENSION (ML) NASAL DAILY
Status: DISCONTINUED | OUTPATIENT
Start: 2023-04-23 | End: 2023-04-27 | Stop reason: HOSPADM

## 2023-04-23 RX ORDER — LANOLIN ALCOHOL/MO/W.PET/CERES
1 CREAM (GRAM) TOPICAL DAILY
Status: DISCONTINUED | OUTPATIENT
Start: 2023-04-23 | End: 2023-04-27 | Stop reason: HOSPADM

## 2023-04-23 RX ORDER — FUROSEMIDE 10 MG/ML
40 INJECTION INTRAMUSCULAR; INTRAVENOUS DAILY
Status: DISCONTINUED | OUTPATIENT
Start: 2023-04-24 | End: 2023-04-26

## 2023-04-23 RX ORDER — ENOXAPARIN SODIUM 100 MG/ML
30 INJECTION SUBCUTANEOUS EVERY 24 HOURS
Status: DISCONTINUED | OUTPATIENT
Start: 2023-04-23 | End: 2023-04-25

## 2023-04-23 RX ORDER — PRAVASTATIN SODIUM 40 MG/1
40 TABLET ORAL DAILY
Status: DISCONTINUED | OUTPATIENT
Start: 2023-04-23 | End: 2023-04-27 | Stop reason: HOSPADM

## 2023-04-23 RX ADMIN — DIPHENHYDRAMINE HYDROCHLORIDE, ZINC ACETATE: 2; .1 CREAM TOPICAL at 02:04

## 2023-04-23 RX ADMIN — PIPERACILLIN AND TAZOBACTAM 4.5 G: 4; .5 INJECTION, POWDER, LYOPHILIZED, FOR SOLUTION INTRAVENOUS; PARENTERAL at 06:04

## 2023-04-23 RX ADMIN — DIPHENHYDRAMINE HYDROCHLORIDE, ZINC ACETATE: 2; .1 CREAM TOPICAL at 08:04

## 2023-04-23 RX ADMIN — FLUTICASONE PROPIONATE 100 MCG: 50 SPRAY, METERED NASAL at 05:04

## 2023-04-23 RX ADMIN — GUAIFENESIN 600 MG: 600 TABLET, EXTENDED RELEASE ORAL at 08:04

## 2023-04-23 RX ADMIN — IPRATROPIUM BROMIDE AND ALBUTEROL SULFATE 3 ML: .5; 3 SOLUTION RESPIRATORY (INHALATION) at 02:04

## 2023-04-23 RX ADMIN — PIPERACILLIN AND TAZOBACTAM 4.5 G: 4; .5 INJECTION, POWDER, LYOPHILIZED, FOR SOLUTION INTRAVENOUS; PARENTERAL at 10:04

## 2023-04-23 RX ADMIN — PRAVASTATIN SODIUM 40 MG: 40 TABLET ORAL at 01:04

## 2023-04-23 RX ADMIN — ACETAMINOPHEN 650 MG: 325 TABLET ORAL at 05:04

## 2023-04-23 RX ADMIN — FERROUS SULFATE TAB 325 MG (65 MG ELEMENTAL FE) 1 EACH: 325 (65 FE) TAB at 02:04

## 2023-04-23 RX ADMIN — ENOXAPARIN SODIUM 30 MG: 40 INJECTION SUBCUTANEOUS at 04:04

## 2023-04-23 RX ADMIN — MICONAZOLE NITRATE: 20 POWDER TOPICAL at 09:04

## 2023-04-23 RX ADMIN — FUROSEMIDE 20 MG: 10 INJECTION, SOLUTION INTRAMUSCULAR; INTRAVENOUS at 01:04

## 2023-04-23 RX ADMIN — ASPIRIN 81 MG CHEWABLE TABLET 81 MG: 81 TABLET CHEWABLE at 09:04

## 2023-04-23 RX ADMIN — FUROSEMIDE 20 MG: 20 TABLET ORAL at 09:04

## 2023-04-23 RX ADMIN — GUAIFENESIN 600 MG: 600 TABLET, EXTENDED RELEASE ORAL at 05:04

## 2023-04-23 RX ADMIN — DIPHENHYDRAMINE HYDROCHLORIDE, ZINC ACETATE: 2; .1 CREAM TOPICAL at 09:04

## 2023-04-23 RX ADMIN — FUROSEMIDE 20 MG: 10 INJECTION, SOLUTION INTRAMUSCULAR; INTRAVENOUS at 10:04

## 2023-04-23 RX ADMIN — DOCUSATE SODIUM 100 MG: 100 CAPSULE, LIQUID FILLED ORAL at 10:04

## 2023-04-23 RX ADMIN — FLUCONAZOLE 200 MG: 200 TABLET ORAL at 09:04

## 2023-04-23 RX ADMIN — MICONAZOLE NITRATE: 20 POWDER TOPICAL at 08:04

## 2023-04-23 RX ADMIN — IPRATROPIUM BROMIDE AND ALBUTEROL SULFATE 3 ML: .5; 3 SOLUTION RESPIRATORY (INHALATION) at 04:04

## 2023-04-23 RX ADMIN — VANCOMYCIN HYDROCHLORIDE 1000 MG: 1 INJECTION, POWDER, LYOPHILIZED, FOR SOLUTION INTRAVENOUS at 04:04

## 2023-04-23 RX ADMIN — PIPERACILLIN AND TAZOBACTAM 4.5 G: 4; .5 INJECTION, POWDER, LYOPHILIZED, FOR SOLUTION INTRAVENOUS; PARENTERAL at 01:04

## 2023-04-23 NOTE — CARE UPDATE
Updated daughter Afshan on patient status, POC. She voiced understanding. All questions answered.

## 2023-04-23 NOTE — ASSESSMENT & PLAN NOTE
This patient does have evidence of infective focus  My overall impression is sepsis.  Source: Skin and Soft Tissue (location RLE)  Antibiotics given-   Antibiotics (72h ago, onward)    Start     Stop Route Frequency Ordered    04/22/23 1600  vancomycin (VANCOCIN) 1,000 mg in dextrose 5 % (D5W) 250 mL IVPB (Vial-Mate)         -- IV Every 24 hours (non-standard times) 04/22/23 0958    04/21/23 2330  piperacillin-tazobactam (ZOSYN) 4.5 g in dextrose 5 % in water (D5W) 5 % 100 mL IVPB (MB+)         -- IV Every 8 hours (non-standard times) 04/21/23 1505    04/21/23 1600  vancomycin - pharmacy to dose  (vancomycin IVPB)        See Hyperspace for full Linked Orders Report.    -- IV pharmacy to manage frequency 04/21/23 1505        Latest lactate reviewed-  Recent Labs   Lab 04/21/23  1132 04/21/23  1457   LACTATE 1.5 2.2     Organ dysfunction indicated by no organ dysfunction     Fluid challenge Actual Body weight- Patient will receive 30ml/kg actual body weight to calculate fluid bolus for treatment of septic shock.     Post- resuscitation assessment Yes Perfusion exam was performed within 6 hours of septic shock presentation after bolus shows Adequate tissue perfusion assessed by non-invasive monitoring       Will Not start Pressors- Levophed for MAP of 65  Source control achieved by: IV vancomycin and zosyn     Wound care   PRN tylenol for pain or fever. Patient has multiple allergies  Diflucan and topical tx for yeast   Blood cx pending NGTD    ESR, CRP - elevated, xray RLE to r/o osteo with chronic wound. No evidence of osteo. Will MRI given ESR, CRP elevated       -given multiple positive wound cx in the past to lower extremities will consult ID to guide abx        PT/OT for mobility

## 2023-04-23 NOTE — CARE UPDATE
Patient seen and evaluated. Full note to follow.  CHF - recommend scheduling lasix - 40 mg IV daily  OK to restart statin  Incentive spirometry to bedside

## 2023-04-23 NOTE — ASSESSMENT & PLAN NOTE
hgb 9.8--> 7.3  Anemia panel   Stool for OCB   Type and screen   UA without blood   Close monitoring   Transfuse if Hgb < 7 or symptomatic     Lab Results   Component Value Date    IRON <10 (L) 04/22/2023    TRANSFERRIN 165 (L) 04/22/2023    TIBC 244 (L) 04/22/2023    FESATURATED Unable to calculate 04/22/2023      Add iron PO

## 2023-04-23 NOTE — ASSESSMENT & PLAN NOTE
Palpable R dorsalis pedal pulse   Will US RLE arterial for further vascular assessment   No evidence of high-grade stenosis of the right lower extremity arteries.  2. Monophasic waveforms in the popliteal artery and calf arteries, likely related to vessel hardening from atherosclerotic disease.    Check venous doppler to RO DVT No evidence of deep venous thrombosis in the right lower extremity.     Right lower extremity nonspecific subcutaneous edema, with right groin few prominent but nonenlarged lymph nodes.

## 2023-04-23 NOTE — SUBJECTIVE & OBJECTIVE
Interval hx: this am patient is more calm and cooperative.She has some SOB with a dry cough. She has pain to RLE. She denies bleeding aside from minimal bleed to RLE wound. Further complaints not noted.     Objective:     Vital Signs (Most Recent):  Temp: 98 °F (36.7 °C) (04/23/23 1139)  Pulse: 69 (04/23/23 1159)  Resp: 19 (04/23/23 1139)  BP: (!) 117/59 (04/23/23 1139)  SpO2: 98 % (04/23/23 1139)   Vital Signs (24h Range):  Temp:  [97.5 °F (36.4 °C)-98.3 °F (36.8 °C)] 98 °F (36.7 °C)  Pulse:  [62-83] 69  Resp:  [18-20] 19  SpO2:  [97 %-100 %] 98 %  BP: ()/(51-94) 117/59     Weight: 70.8 kg (156 lb)  Body mass index is 25.96 kg/m².    Physical Exam  Constitutional:       Appearance: She is ill-appearing.   HENT:      Head: Normocephalic and atraumatic.      Mouth/Throat:      Pharynx: Oropharynx is clear.   Eyes:      Conjunctiva/sclera: Conjunctivae normal.   Cardiovascular:      Rate and Rhythm: Tachycardia present. Rhythm irregular.      Pulses: Normal pulses.      Heart sounds: Normal heart sounds.   Pulmonary:      Effort: Pulmonary effort is normal.      Comments: coarse   Abdominal:      General: Abdomen is flat. Bowel sounds are normal.      Palpations: Abdomen is soft.   Musculoskeletal:         General: Swelling and tenderness present.      Cervical back: Normal range of motion and neck supple.   Skin:     General: Skin is warm and dry.      Comments: RLE edematous, erythematous, weeping, tender, warm  Palpable pulse     LLE with skin tear and rash, does not appear infected     Noted yeast type rash to perineal area while she is sitting on BS commode     Erythematous rash to back    Neurological:      General: No focal deficit present.      Mental Status: She is alert.   Psychiatric:         Mood and Affect: Mood is not anxious.         Behavior: Behavior is not agitated.           Significant Labs: All pertinent labs within the past 24 hours have been reviewed.    Significant Imaging: I have  reviewed all pertinent imaging results/findings within the past 24 hours.  Review of Systems   IMPROVE-DD Application Not Available

## 2023-04-23 NOTE — PLAN OF CARE
Problem: Occupational Therapy  Goal: Occupational Therapy Goal  Description: Goals to be met by: 05/23/23     Patient will increase functional independence with ADLs by performing:    UE Dressing with Set-up Assistance.  LE Dressing with Minimal Assistance.  Grooming while EOB with Modified [place].  Toileting from bedside commode with Supervision for hygiene and clothing management.   Toilet transfer to bedside commode with Supervision.  Upper extremity exercise program 3x10 reps per handout, with supervision.    Outcome: Ongoing, Progressing     Pt was agreeable to and participated in OT evaluation.  Pt reports that she lives alone in a senior apartment complex.  She states that she completed all of her func mobility skills and ADLs with mod I at [facility].  Pt currently requires CGA - min A with func mobility and set up - Mod A with ADLs.  Goals established to assist pt with returning to [facility] regarding ADLs and func mobility.  Pt will benefit from skilled OT services in order to increase her level of safety and independence with ADLs and mobility.      Елена Wheatley, OT  4/23/2023

## 2023-04-23 NOTE — PROGRESS NOTES
"Pharmacokinetic Assessment Follow Up: IV Vancomycin    Vancomycin serum concentration assessment(s):    The trough level was drawn correctly and can be used to guide therapy at this time. The measurement is below the desired definitive target range of 15 to 20 mcg/mL.    Vancomycin Regimen Plan:    Change regimen to Vancomycin 1250 mg IV every 24 hours with next serum trough concentration measured at 15:00 on 4-26    Drug levels (last 3 results):  Recent Labs   Lab Result Units 04/23/23  1516   Vancomycin-Trough ug/mL 10.7       Pharmacy will continue to follow and monitor vancomycin.    Please contact pharmacy at extension [phone] for questions regarding this assessment.    Thank you for the consult,   Spencer Lane       Patient brief summary:  Jenniffer Jimenez is a 89 y.o. female initiated on antimicrobial therapy with IV Vancomycin for treatment of sepsis    The patient's current regimen is 1000 q24    Drug Allergies:   Review of patient's allergies indicates:   Allergen Reactions    Opioids - morphine analogues Other (See Comments)     Bowel issues; bowel obstruction    Tizanidine Other (See Comments)     "Lips were numb,  Almost passed out."    Tramadol Hallucinations    Beta-blockers (beta-adrenergic blocking agts) Other (See Comments)     Can not go on beta blockers for long period of time - due to taking allergy injections    Morphine     Opioids-meperidine and related     Ciprofloxacin Rash       Actual Body Weight:   70.8 kg    Renal Function:   Estimated Creatinine Clearance: 30.1 mL/min (based on SCr of 1.2 mg/dL).,     Dialysis Method (if applicable):  N/A    CBC (last 72 hours):  Recent Labs   Lab Result Units 04/21/23  1132 04/22/23  0349 04/23/23  0642   WBC K/uL 4.66 3.80* 3.63*   Hemoglobin g/dL 9.8* 7.3* 7.9*   Hematocrit % 30.5* 22.6* 24.3*   Platelets K/uL 219 161 162   Gran % % 80.4* 64.7 71.0   Lymph % % 10.3* 18.4 14.9*   Mono % % 7.9 15.3* 11.6   Eosinophil % % 0.4 0.5 1.1   Basophil % % " 0.4 0.3 0.3   Differential Method  Automated Automated Automated       Metabolic Panel (last 72 hours):  Recent Labs   Lab Result Units 04/21/23  1132 04/21/23  1418 04/22/23  0349 04/23/23  0642   Sodium mmol/L 137  --  135* 137   Potassium mmol/L 5.0  --  4.1 4.2   Chloride mmol/L 101  --  104 104   CO2 mmol/L 22*  --  21* 23   Glucose mg/dL 88  --  107 95   Glucose, UA   --  Negative  --   --    BUN mg/dL 24  --  27 26   Creatinine mg/dL 1.1  --  1.2 1.2   Albumin g/dL 3.1*  --  2.2* 2.3*   Total Bilirubin mg/dL 0.3  --  0.3 0.2   Alkaline Phosphatase U/L 120  --  86 81   AST U/L 18  --  11 11   ALT U/L 13  --  8* 9*   Magnesium mg/dL 2.0  --  2.0 2.0   Phosphorus mg/dL  --   --  3.7 3.6       Vancomycin Administrations:  vancomycin given in the last 96 hours                     vancomycin (VANCOCIN) 1,000 mg in dextrose 5 % (D5W) 250 mL IVPB (Vial-Mate) (mg) 1,000 mg New Bag 04/23/23 1623     1,000 mg New Bag 04/22/23 1628    vancomycin 1,500 mg in dextrose 5 % (D5W) 250 mL IVPB (Vial-Mate) (mg) 1,500 mg New Bag 04/21/23 1721                    Microbiologic Results:  Microbiology Results (last 7 days)       Procedure Component Value Units Date/Time    Blood culture x two cultures. Draw prior to antibiotics. [[id number]] Collected: 04/21/23 1136    Order Status: Completed Specimen: Blood from Peripheral, Antecubital, Left Updated: 04/22/23 2212     Blood Culture, Routine No Growth to date      No Growth to date    Narrative:      Aerobic and anaerobic    Blood culture x two cultures. Draw prior to antibiotics. [[id number]] Collected: 04/21/23 1136    Order Status: Completed Specimen: Blood from Peripheral, Wrist, Right Updated: 04/22/23 2212     Blood Culture, Routine No Growth to date      No Growth to date    Narrative:      Aerobic and anaerobic

## 2023-04-23 NOTE — PROGRESS NOTES
Pharmacist Renal Dose Adjustment Note    Jenniffer Jimenez is a 89 y.o. female being treated with the medication enoxaparin    Patient Data:    Vital Signs (Most Recent):  Temp: 97.8 °F (36.6 °C) (04/23/23 0440)  Pulse: 83 (04/23/23 0440)  Resp: 18 (04/23/23 0440)  BP: (!) 125/94 (04/23/23 0440)  SpO2: 98 % (04/23/23 0440)   Vital Signs (72h Range):  Temp:  [97.5 °F (36.4 °C)-101.8 °F (38.8 °C)]   Pulse:  []   Resp:  [18-27]   BP: ()/(45-94)   SpO2:  [92 %-100 %]      Recent Labs   Lab 04/21/23  1132 04/22/23  0349   CREATININE 1.1 1.2     Serum creatinine: 1.2 mg/dL 04/22/23 0349  Estimated creatinine clearance: 30.1 mL/min    Medication:enoxaparin dose: 40 mg frequency q24h will be changed to medication:enoxaparin dose:30 mg frequency:q24h    Pharmacist's Name: Darby Mackay  Pharmacist's Extension: [phone]

## 2023-04-23 NOTE — PLAN OF CARE
AAOx4. No C/o pain. Tolerating cardiac diet. IV ABX.    Blood glucose and cardiac monitoring maintained. Bed locked in lowest position, bed alarm set and call bell within reach.

## 2023-04-23 NOTE — ASSESSMENT & PLAN NOTE
Patient with Persistent (7 days or more) atrial fibrillation which is controlled currently with Calcium Channel Blocker. Patient is currently in atrial fibrillation.[id number] Score: 4. HASBLED Score:  Anticoagulation not on anticoagulation due to watchman device on asa only     Normally rate controlled   afib rvr in the ED   IV cardizem given, BB on allergy list   6 beat run of v-tach overnight 4/22, pt asymptomatic    Cardiology consulted

## 2023-04-23 NOTE — ASSESSMENT & PLAN NOTE
Patient is identified as having Diastolic (HFpEF) heart failure that is Chronic. CHF is currently uncontrolled. Latest ECHO performed and demonstrates- Results for orders placed during the hospital encounter of 10/07/22    Echo     The left ventricle is normal in size with concentric hypertrophy and normal systolic function.   The estimated ejection fraction is 55%.   Indeterminate left ventricular diastolic function.   With normal right ventricular systolic function.   Severe left atrial enlargement.   Severe right atrial enlargement.   Mild mitral regurgitation.   Mild to moderate tricuspid regurgitation.   There is pulmonary hypertension.   The estimated PA systolic pressure is 56 mmHg.   Elevated central venous pressure (15 mmHg  . Continue Furosemide and monitor clinical status closely. Monitor on telemetry. Patient is off CHF pathway.  Monitor strict Is&Os and daily weights.  Place on fluid restriction of 1.5 L. Continue to stress to patient importance of self efficacy and  on diet for CHF. Last BNP reviewed- and noted below   Recent Labs   Lab 04/21/23  1132   *   .    S/p sepsis IVF. Appears volume overloaded - lasix 20 mg IV x1  4/22 then may continue lasix at home dose PO     Strict I&O    Low na diet   Following cardiology recs       Continue lasix at 40 mg IV daily - patient needs increased diuresis   Incentive spirometry   CXR: The heart is enlarged.  Calcified atheromatous disease affects the aorta.  There is pulmonary vascular congestion with mild edema.  A left-sided pleural effusion is present.  Findings appear slightly worsened as compared to the previous study of 04/21/2023

## 2023-04-23 NOTE — PLAN OF CARE
Problem: Adult Inpatient Plan of Care  Goal: Plan of Care Review  Outcome: Ongoing, Progressing     Problem: Diabetes Comorbidity  Goal: Blood Glucose Level Within Targeted Range  Outcome: Ongoing, Progressing     Problem: Infection Progression (Sepsis/Septic Shock)  Goal: Absence of Infection Signs and Symptoms  Outcome: Ongoing, Progressing     Problem: Fall Injury Risk  Goal: Absence of Fall and Fall-Related Injury  Outcome: Ongoing, Progressing

## 2023-04-23 NOTE — PROGRESS NOTES
[facility] Medicine  Progress Note    Patient Name: Jenniffer Jimenez  MRN: [id number]  Patient Class: OP- Observation   Admission Date: 4/21/2023  Length of Stay: 0 days  Attending Physician: Christy Iniguez MD  Primary Care Provider: Rubi Young DO        Subjective:     Principal Problem:Sepsis        HPI:  89 yo female with a PMH of cataract, [place], CKD, DM II, diabetic polyneuropathy, HTN, PAF, TIA presents to the ED For evaluation of fever noted by home health nurse. RLE appears edematous, erythematous, weeping worse for unknown duration. She also has a rash to her perineal area and back. She notes pain to the RLE. Since in the ED she was noted to be in afib RVR, which is now rate controlled with a dose of IV cardizem. Patient is a poor historian, yelling, hx of psychiatric problem.     I tried to phone POA for further hx- no answer.     Current OP medications as reviewed per recent cardiology note:       acetaminophen (TYLENOL) 500 MG tablet, Take 1,000 mg by mouth 2 (two) times a day., Disp: , Rfl:     aspirin (ECOTRIN) 81 MG EC tablet, Take 1 tablet (81 mg total) by mouth once daily., Disp: 90 tablet, Rfl: 3    diclofenac sodium (VOLTAREN) 1 % Gel, Apply 2 g topically 4 (four) times daily. Use gloves to apply to right hip for 10 days, Disp: 100 g, Rfl: 1    miconazole NITRATE 2 % (MICOTIN) 2 % top powder, Apply topically 2 (two) times daily. To RLE., Disp: , Rfl: 0    nystatin (MYCOSTATIN) powder, Apply topically 2 (two) times daily., Disp: 30 g, Rfl: 1    pravastatin (PRAVACHOL) 40 MG tablet, Take 1 tablet (40 mg total) by mouth once daily., Disp: 90 tablet, Rfl: 3    silver sulfADIAZINE 1% (SILVADENE) 1 % cream, Apply to RLE skin breakdown twice daily, Disp: , Rfl:     triamcinolone acetonide 0.5% (KENALOG) 0.5 % Crea, Apply to legs with dressing changes., Disp: 454 g, Rfl: 0    vit C/E/Zn/coppr/lutein/zeaxan (OCUVITE LUTEIN AND ZEAXANTHIN ORAL), Take 1 capsule by mouth once  daily. Lutien 25 mg, Zeaxanthin 5 mg, Disp: , Rfl:     vitamin E 400 UNIT capsule, Take 400 Units by mouth once daily., Disp: , Rfl:     furosemide (LASIX) 20 MG tablet, Take 1 tablet (20 mg total) by mouth once daily. Hold for weights 150 lb or less, Disp: 30 tablet, Rfl: 11    QUEtiapine (SEROQUEL) 25 MG Tab, Take 1 tablet (25 mg total) by mouth every evening. (Patient not taking: Reported on 4/12/2023), Disp: 30 tablet, Rfl: 11    sodium zirconium cyclosilicate (LOKELMA) 5 gram packet, Take 1 packet (5 g total) by mouth 2 (two) times a day. Mix entire contents of packet(s) into drinking glass containing 3 tablespoons of water; stir well and drink immediately. Add water and repeat until no powder remains to receive entire dose., Disp: 60 packet, Rfl: 3       Overview/Hospital Course:  No notes on file    Interval hx: this am patient is more calm and cooperative.She has some SOB with a dry cough. She has pain to RLE. She denies bleeding aside from minimal bleed to RLE wound. Further complaints not noted.     Objective:     Vital Signs (Most Recent):  Temp: 98 °F (36.7 °C) (04/23/23 1139)  Pulse: 69 (04/23/23 1159)  Resp: 19 (04/23/23 1139)  BP: (!) 117/59 (04/23/23 1139)  SpO2: 98 % (04/23/23 1139)   Vital Signs (24h Range):  Temp:  [97.5 °F (36.4 °C)-98.3 °F (36.8 °C)] 98 °F (36.7 °C)  Pulse:  [62-83] 69  Resp:  [18-20] 19  SpO2:  [97 %-100 %] 98 %  BP: ()/(51-94) 117/59     Weight: 70.8 kg (156 lb)  Body mass index is 25.96 kg/m².    Physical Exam  Constitutional:       Appearance: She is ill-appearing.   HENT:      Head: Normocephalic and atraumatic.      Mouth/Throat:      Pharynx: Oropharynx is clear.   Eyes:      Conjunctiva/sclera: Conjunctivae normal.   Cardiovascular:      Rate and Rhythm: Tachycardia present. Rhythm irregular.      Pulses: Normal pulses.      Heart sounds: Normal heart sounds.   Pulmonary:      Effort: Pulmonary effort is normal.      Comments: coarse   Abdominal:      General:  Abdomen is flat. Bowel sounds are normal.      Palpations: Abdomen is soft.   Musculoskeletal:         General: Swelling and tenderness present.      Cervical back: Normal range of motion and neck supple.   Skin:     General: Skin is warm and dry.      Comments: RLE edematous, erythematous, weeping, tender, warm  Palpable pulse     LLE with skin tear and rash, does not appear infected     Noted yeast type rash to perineal area while she is sitting on BS commode     Erythematous rash to back    Neurological:      General: No focal deficit present.      Mental Status: She is alert.   Psychiatric:         Mood and Affect: Mood is not anxious.         Behavior: Behavior is not agitated.           Significant Labs: All pertinent labs within the past 24 hours have been reviewed.    Significant Imaging: I have reviewed all pertinent imaging results/findings within the past 24 hours.  Review of Systems      Assessment/Plan:      * Sepsis  This patient does have evidence of infective focus  My overall impression is sepsis.  Source: Skin and Soft Tissue (location RLE)  Antibiotics given-   Antibiotics (72h ago, onward)    Start     Stop Route Frequency Ordered    04/22/23 1600  vancomycin (VANCOCIN) 1,000 mg in dextrose 5 % (D5W) 250 mL IVPB (Vial-Mate)         -- IV Every 24 hours (non-standard times) 04/22/23 0958    04/21/23 2330  piperacillin-tazobactam (ZOSYN) 4.5 g in dextrose 5 % in water (D5W) 5 % 100 mL IVPB (MB+)         -- IV Every 8 hours (non-standard times) 04/21/23 1505    04/21/23 1600  vancomycin - pharmacy to dose  (vancomycin IVPB)        See Hyperspace for full Linked Orders Report.    -- IV pharmacy to manage frequency 04/21/23 1505        Latest lactate reviewed-  Recent Labs   Lab 04/21/23  1132 04/21/23  1457   LACTATE 1.5 2.2     Organ dysfunction indicated by no organ dysfunction     Fluid challenge Actual Body weight- Patient will receive 30ml/kg actual body weight to calculate fluid bolus for  treatment of septic shock.     Post- resuscitation assessment Yes Perfusion exam was performed within 6 hours of septic shock presentation after bolus shows Adequate tissue perfusion assessed by non-invasive monitoring       Will Not start Pressors- Levophed for MAP of 65  Source control achieved by: IV vancomycin and zosyn     Wound care   PRN tylenol for pain or fever. Patient has multiple allergies  Diflucan and topical tx for yeast   Blood cx pending NGTD    ESR, CRP - elevated, xray RLE to r/o osteo with chronic wound. No evidence of osteo. Will MRI given ESR, CRP elevated       -given multiple positive wound cx in the past to lower extremities will consult ID to guide abx        PT/OT for mobility    Constipation    Add colace     Microcytic anemia    hgb 9.8--> 7.3  Anemia panel   Stool for OCB   Type and screen   UA without blood   Close monitoring   Transfuse if Hgb < 7 or symptomatic     Lab Results   Component Value Date    IRON <10 (L) 04/22/2023    TRANSFERRIN 165 (L) 04/22/2023    TIBC 244 (L) 04/22/2023    FESATURATED Unable to calculate 04/22/2023      Add iron PO     Venous insufficiency of both lower extremities    Palpable R dorsalis pedal pulse   Will US RLE arterial for further vascular assessment   No evidence of high-grade stenosis of the right lower extremity arteries.  2. Monophasic waveforms in the popliteal artery and calf arteries, likely related to vessel hardening from atherosclerotic disease.    Check venous doppler to RO DVT No evidence of deep venous thrombosis in the right lower extremity.     Right lower extremity nonspecific subcutaneous edema, with right groin few prominent but nonenlarged lymph nodes.    Prediabetes    Lab Results   Component Value Date    HGBA1C 6.0 (H) 01/26/2023     Low CSI   Consistent carb diet     History of TIA (transient ischemic attack)    Resume asa, statin     Chronic diastolic heart failure    Patient is identified as having Diastolic (HFpEF) heart  failure that is Chronic. CHF is currently uncontrolled. Latest ECHO performed and demonstrates- Results for orders placed during the hospital encounter of 10/07/22    Echo     The left ventricle is normal in size with concentric hypertrophy and normal systolic function.   The estimated ejection fraction is 55%.   Indeterminate left ventricular diastolic function.   With normal right ventricular systolic function.   Severe left atrial enlargement.   Severe right atrial enlargement.   Mild mitral regurgitation.   Mild to moderate tricuspid regurgitation.   There is pulmonary hypertension.   The estimated PA systolic pressure is 56 mmHg.   Elevated central venous pressure (15 mmHg  . Continue Furosemide and monitor clinical status closely. Monitor on telemetry. Patient is off CHF pathway.  Monitor strict Is&Os and daily weights.  Place on fluid restriction of 1.5 L. Continue to stress to patient importance of self efficacy and  on diet for CHF. Last BNP reviewed- and noted below   Recent Labs   Lab 04/21/23  1132   *   .    S/p sepsis IVF. Appears volume overloaded - lasix 20 mg IV x1  4/22 then may continue lasix at home dose PO     Strict I&O    Low na diet   Following cardiology recs       Continue lasix at 40 mg IV daily - patient needs increased diuresis   Incentive spirometry   CXR: The heart is enlarged.  Calcified atheromatous disease affects the aorta.  There is pulmonary vascular congestion with mild edema.  A left-sided pleural effusion is present.  Findings appear slightly worsened as compared to the previous study of 04/21/2023         Stage 3b chronic kidney disease    Cr 1.1  Baseline     Chronic atrial fibrillation  Patient with Persistent (7 days or more) atrial fibrillation which is controlled currently with Calcium Channel Blocker. Patient is currently in atrial fibrillation.[id number] Score: 4. HASBLED Score:  Anticoagulation not on anticoagulation due to watchman device on  asa only     Normally rate controlled   afib rvr in the ED   IV cardizem given, BB on allergy list   6 beat run of v-tach overnight 4/22, pt asymptomatic    Cardiology consulted     Primary hypertension    Hold medications for now    Pure hypercholesterolemia    Ok to resume statin       VTE Risk Mitigation (From admission, onward)         Ordered     enoxaparin injection 30 mg  Daily         04/23/23 0636     IP VTE HIGH RISK PATIENT  Once         04/21/23 1507     Place sequential compression device  Until discontinued         04/21/23 1507                Discharge Planning   GILES:      Code Status: Full Code   Is the patient medically ready for discharge?:     Reason for patient still in hospital (select all that apply): Patient trending condition                     Amna Hernandez NP  Department of [facility] Medicine   [facility] Surg

## 2023-04-23 NOTE — PLAN OF CARE
VIRTUAL NURSE:  Labs, notes, orders, and careplan reviewed. VN to be available as needed.        Problem: Adult Inpatient Plan of Care  Goal: Plan of Care Review  Outcome: Ongoing, Progressing  Goal: Patient-Specific Goal (Individualized)  Outcome: Ongoing, Progressing  Goal: Absence of Hospital-Acquired Illness or Injury  Outcome: Ongoing, Progressing  Goal: Optimal Comfort and Wellbeing  Outcome: Ongoing, Progressing  Goal: Readiness for Transition of Care  Outcome: Ongoing, Progressing     Problem: Diabetes Comorbidity  Goal: Blood Glucose Level Within Targeted Range  Outcome: Ongoing, Progressing     Problem: Adjustment to Illness (Sepsis/Septic Shock)  Goal: Optimal Coping  Outcome: Ongoing, Progressing     Problem: Bleeding (Sepsis/Septic Shock)  Goal: Absence of Bleeding  Outcome: Ongoing, Progressing     Problem: Glycemic Control Impaired (Sepsis/Septic Shock)  Goal: Blood Glucose Level Within Desired Range  Outcome: Ongoing, Progressing     Problem: Infection Progression (Sepsis/Septic Shock)  Goal: Absence of Infection Signs and Symptoms  Outcome: Ongoing, Progressing     Problem: Nutrition Impaired (Sepsis/Septic Shock)  Goal: Optimal Nutrition Intake  Outcome: Ongoing, Progressing     Problem: Impaired Wound Healing  Goal: Optimal Wound Healing  Outcome: Ongoing, Progressing     Problem: Fall Injury Risk  Goal: Absence of Fall and Fall-Related Injury  Outcome: Ongoing, Progressing     Problem: Skin Injury Risk Increased  Goal: Skin Health and Integrity  Outcome: Ongoing, Progressing     Problem: Adjustment to Illness (Chronic Kidney Disease)  Goal: Optimal Coping with Chronic Illness  Outcome: Ongoing, Progressing     Problem: Electrolyte Imbalance (Chronic Kidney Disease)  Goal: Electrolyte Balance  Outcome: Ongoing, Progressing     Problem: Fluid Volume Excess (Chronic Kidney Disease)  Goal: Fluid Balance  Outcome: Ongoing, Progressing     Problem: Functional Decline (Chronic Kidney Disease)  Goal:  Optimal Functional Ability  Outcome: Ongoing, Progressing     Problem: Hematologic Alteration (Chronic Kidney Disease)  Goal: Absence of Anemia Signs and Symptoms  Outcome: Ongoing, Progressing     Problem: Oral Intake Inadequate (Chronic Kidney Disease)  Goal: Optimal Oral Intake  Outcome: Ongoing, Progressing     Problem: Pain (Chronic Kidney Disease)  Goal: Acceptable Pain Control  Outcome: Ongoing, Progressing     Problem: Renal Function Impairment (Chronic Kidney Disease)  Goal: Minimize Renal Failure Effects  Outcome: Ongoing, Progressing

## 2023-04-23 NOTE — PT/OT/SLP EVAL
Occupational Therapy   Evaluation    Name: Jenniffer Jimenez  MRN: [id number]  Admitting Diagnosis: Sepsis  Recent Surgery: * No surgery found *      Recommendations:     Discharge Recommendations: nursing facility, skilled  Discharge Equipment Recommendations:   (TBD)  Barriers to discharge:  Decreased caregiver support    Assessment:     Jenniffer Jimenez is a 89 y.o. female with a medical diagnosis of Sepsis.  She presents with the following performance deficits affecting function: weakness, impaired endurance, impaired sensation, impaired self care skills, impaired functional mobility, gait instability, impaired balance, impaired cognition, decreased coordination, decreased lower extremity function, decreased safety awareness, decreased ROM, impaired coordination, impaired skin, edema, impaired cardiopulmonary response to activity.  Pt was agreeable to and participated in OT evaluation.  Pt reports that she lives alone in a senior apartment complex.  She states that she completed all of her func mobility skills and ADLs with mod I at [facility].  Pt currently requires CGA - min A with func mobility and set up - Mod A with ADLs.  Goals established to assist pt with returning to [facility] regarding ADLs and func mobility.  Pt will benefit from skilled OT services in order to increase her level of safety and independence with ADLs and mobility.      Rehab Prognosis: Fair; patient would benefit from acute skilled OT services to address these deficits and reach maximum level of function.       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 on 5th  floor with elevator access - has WIS with bench and GB  Previous level of function: mod I with mobility and ADLs using DME  Roles and Routines: active with activities at home  Equipment Used at Home: bedside commode, walker, rolling, wheelchair, rollator, cane, straight  Assistance upon Discharge: limited    Pain/Comfort:  Pain Rating 1: 0/10  Pain Rating Post-Intervention 1: 0/10    Patients cultural, spiritual, Mandaen conflicts given the current situation: no    Objective:     Communicated with: nurse prior to session.  Patient found HOB elevated with bed alarm, oxygen, peripheral IV, pulse ox (continuous), telemetry ([employer] camera) upon OT entry to room.    General Precautions: Standard, fall, hearing impaired  Orthopedic Precautions: N/A  Braces: N/A  Respiratory Status: Nasal cannula, flow 4 L/min    Occupational Performance:    Bed Mobility:    Patient completed Scooting/Bridging with minimum assistance  Patient completed Supine to Sit with contact guard assistance  Patient completed Sit to Supine with minimum assistance    Functional Mobility/Transfers:  Patient completed Sit <> Stand Transfer with contact guard assistance  with  hand-held assist   Patient completed Toilet Transfer Step Transfer technique with contact guard assistance with  bedside commode    Activities of Daily Living:  Feeding:  set up A    Grooming: set up A sitting EOB    Toileting: moderate assistance needed A with hygiene    Cognitive/Visual Perceptual:  Cognitive/Psychosocial Skills:     -       Oriented to: Person, Place, and Situation   -       Follows Commands/attention:Easily distracted and Follows one-step commands  -       Communication: clear/fluent and [place] - wears hearing aids  -       Memory: No Deficits noted  -       Safety awareness/insight to disability: impaired   -       Mood/Affect/Coping skills/emotional control: Appropriate to situation    Physical Exam:  Balance: -       Sitting = good;  Standing = CGA-min  A  Postural examination/scapula alignment:    -       Rounded shoulders  -       Forward head  Skin integrity: wounds and bandages on B lower legs - R worse than L  Edema:  Moderate B LEs  Sensation: -       Impaired  B LEs  Upper Extremity Range of Motion:     -       Right Upper Extremity: WFL for ADLS  -       Left Upper Extremity: WFL except shoulder  Upper Extremity Strength:    -       Right Upper Extremity: WFL for ADLS  -       Left Upper Extremity: WFL except shoulder   Strength:  B hands = WFL for ADLs    AMPAC 6 Click ADL:  AMPAC Total Score: 18    Treatment & Education:  Pt completed ADLs and func mobility activities for tx session as noted above  Pt noted with decreased oxygenation following transfers - SOB noted and required cues to take deep breaths (in through nose and out through mouth) until O2 saturation returned to normal - occurred 2x - pt reports no O2 use at home  Pt educated on role of OT and POC      Patient left HOB elevated with all lines intact, call button in reach, and bed alarm on    GOALS:   Multidisciplinary Problems       Occupational Therapy Goals          Problem: Occupational Therapy    Goal Priority Disciplines Outcome Interventions   Occupational Therapy Goal     OT, PT/OT Ongoing, Progressing    Description: Goals to be met by: 05/23/23     Patient will increase functional independence with ADLs by performing:    UE Dressing with Set-up Assistance.  LE Dressing with Minimal Assistance.  Grooming while EOB with Modified [place].  Toileting from bedside commode with Supervision for hygiene and clothing management.   Toilet transfer to bedside commode with Supervision.  Upper extremity exercise program 3x10 reps per handout, with supervision.                         History:     Past Medical History:   Diagnosis Date    Amblyopia of left eye 04/10/2013    Arthritis     Facet arthropathy, Lumbosacral    Atherosclerosis of aorta     Cataract     Central retinal vein  occlusion of left eye     CKD (chronic kidney disease) stage 3, GFR 30-59 ml/min     Diabetes mellitus, type 2     Diabetic polyneuropathy 2022    Essential (primary) hypertension     Exotropia of both eyes 2013    recession RSR 5.0mm w/ adj; recession LR os 5.0 w/ adj; resect MR os  4.0mm    Hearing loss     History of resection of small bowel     Hypertensive retinopathy of both eyes     Hypoglycemia     Macular degeneration     OA (osteoarthritis) of shoulder     Right    Osteoporosis     Paroxysmal atrial fibrillation     Posterior vitreous detachment of both eyes     Psychiatric problem     Rhinitis     TIA (transient ischemic attack)          Past Surgical History:   Procedure Laterality Date    APPENDECTOMY      CARDIAC CATHETERIZATION      CATARACT EXTRACTION W/  INTRAOCULAR LENS IMPLANT Bilateral      SECTION, CLASSIC      CLOSURE OF LEFT ATRIAL APPENDAGE USING DEVICE N/A 2020    Procedure: Left atrial appendage closure device;  Surgeon: Abundio Curtis MD;  Location: [facility] CATH LAB;  Service: Cardiology;  Laterality: N/A;    HYSTERECTOMY      INNER EAR SURGERY      JOINT REPLACEMENT      LEFT KNEE REPLACEMENT IN  -    OOPHORECTOMY      SINUS SURGERY      STRABISMUS SURGERY  13    RSR recession 5 mm, LLR recession 5 mm and LMR resection 4mm    STRABISMUS SURGERY  2014    recess LR OD 6mm    TONSILLECTOMY      watchman surgery N/A 2020       Time Tracking:     OT Date of Treatment: 23  OT Start Time: 1110  OT Stop Time: 1129  OT Total Time (min): 19 min    Billable Minutes:Evaluation 10  Self Care/Home Management 9    2023

## 2023-04-24 ENCOUNTER — TELEPHONE (OUTPATIENT)
Dept: PRIMARY CARE CLINIC | Facility: CLINIC | Age: 88
End: 2023-04-24
Payer: MEDICARE

## 2023-04-24 PROBLEM — I50.30 HEART FAILURE WITH PRESERVED EJECTION FRACTION: Status: ACTIVE | Noted: 2023-04-24

## 2023-04-24 LAB
ALBUMIN SERPL BCP-MCNC: 2.3 G/DL (ref 3.5–5.2)
ALP SERPL-CCNC: 73 U/L (ref 55–135)
ALT SERPL W/O P-5'-P-CCNC: 9 U/L (ref 10–44)
ANION GAP SERPL CALC-SCNC: 10 MMOL/L (ref 8–16)
AST SERPL-CCNC: 12 U/L (ref 10–40)
BASOPHILS # BLD AUTO: 0.01 K/UL (ref 0–0.2)
BASOPHILS NFR BLD: 0.3 % (ref 0–1.9)
BILIRUB SERPL-MCNC: 0.2 MG/DL (ref 0.1–1)
BUN SERPL-MCNC: 28 MG/DL (ref 10–30)
CALCIUM SERPL-MCNC: 8.2 MG/DL (ref 8.7–10.5)
CHLORIDE SERPL-SCNC: 103 MMOL/L (ref 95–110)
CO2 SERPL-SCNC: 26 MMOL/L (ref 23–29)
CREAT SERPL-MCNC: 1.3 MG/DL (ref 0.5–1.4)
DIFFERENTIAL METHOD: ABNORMAL
EOSINOPHIL # BLD AUTO: 0.1 K/UL (ref 0–0.5)
EOSINOPHIL NFR BLD: 2.1 % (ref 0–8)
ERYTHROCYTE [DISTWIDTH] IN BLOOD BY AUTOMATED COUNT: 18 % (ref 11.5–14.5)
EST. GFR  (NO RACE VARIABLE): 39 ML/MIN/1.73 M^2
GLUCOSE SERPL-MCNC: 95 MG/DL (ref 70–110)
HCT VFR BLD AUTO: 25.4 % (ref 37–48.5)
HGB BLD-MCNC: 8.3 G/DL (ref 12–16)
IMM GRANULOCYTES # BLD AUTO: 0.02 K/UL (ref 0–0.04)
IMM GRANULOCYTES NFR BLD AUTO: 0.6 % (ref 0–0.5)
LYMPHOCYTES # BLD AUTO: 0.7 K/UL (ref 1–4.8)
LYMPHOCYTES NFR BLD: 20.9 % (ref 18–48)
MAGNESIUM SERPL-MCNC: 1.9 MG/DL (ref 1.6–2.6)
MCH RBC QN AUTO: 27.6 PG (ref 27–31)
MCHC RBC AUTO-ENTMCNC: 32.7 G/DL (ref 32–36)
MCV RBC AUTO: 84 FL (ref 82–98)
MONOCYTES # BLD AUTO: 0.4 K/UL (ref 0.3–1)
MONOCYTES NFR BLD: 12.5 % (ref 4–15)
NEUTROPHILS # BLD AUTO: 2.1 K/UL (ref 1.8–7.7)
NEUTROPHILS NFR BLD: 63.6 % (ref 38–73)
NRBC BLD-RTO: 0 /100 WBC
OB PNL STL: NEGATIVE
PHOSPHATE SERPL-MCNC: 4.2 MG/DL (ref 2.7–4.5)
PLATELET # BLD AUTO: 185 K/UL (ref 150–450)
PMV BLD AUTO: 10.6 FL (ref 9.2–12.9)
POCT GLUCOSE: 102 MG/DL (ref 70–110)
POCT GLUCOSE: 109 MG/DL (ref 70–110)
POCT GLUCOSE: 119 MG/DL (ref 70–110)
POCT GLUCOSE: 201 MG/DL (ref 70–110)
POTASSIUM SERPL-SCNC: 3.7 MMOL/L (ref 3.5–5.1)
PROT SERPL-MCNC: 5.7 G/DL (ref 6–8.4)
RBC # BLD AUTO: 3.01 M/UL (ref 4–5.4)
SODIUM SERPL-SCNC: 139 MMOL/L (ref 136–145)
WBC # BLD AUTO: 3.35 K/UL (ref 3.9–12.7)

## 2023-04-24 PROCEDURE — 99222 PR INITIAL HOSPITAL CARE,LEVL II: ICD-10-PCS | Mod: HCNC,,, | Performed by: INTERNAL MEDICINE

## 2023-04-24 PROCEDURE — 82272 OCCULT BLD FECES 1-3 TESTS: CPT | Mod: HCNC | Performed by: NURSE PRACTITIONER

## 2023-04-24 PROCEDURE — 94761 N-INVAS EAR/PLS OXIMETRY MLT: CPT | Mod: HCNC

## 2023-04-24 PROCEDURE — 83735 ASSAY OF MAGNESIUM: CPT | Mod: HCNC | Performed by: NURSE PRACTITIONER

## 2023-04-24 PROCEDURE — 97535 SELF CARE MNGMENT TRAINING: CPT | Mod: HCNC,CO

## 2023-04-24 PROCEDURE — 94799 UNLISTED PULMONARY SVC/PX: CPT | Mod: HCNC

## 2023-04-24 PROCEDURE — 80053 COMPREHEN METABOLIC PANEL: CPT | Mod: HCNC | Performed by: NURSE PRACTITIONER

## 2023-04-24 PROCEDURE — 63600175 PHARM REV CODE 636 W HCPCS: Mod: HCNC | Performed by: STUDENT IN AN ORGANIZED HEALTH CARE EDUCATION/TRAINING PROGRAM

## 2023-04-24 PROCEDURE — 99233 PR SUBSEQUENT HOSPITAL CARE,LEVL III: ICD-10-PCS | Mod: HCNC,,, | Performed by: NURSE PRACTITIONER

## 2023-04-24 PROCEDURE — 27000221 HC OXYGEN, UP TO 24 HOURS: Mod: HCNC

## 2023-04-24 PROCEDURE — 25000242 PHARM REV CODE 250 ALT 637 W/ HCPCS: Mod: HCNC | Performed by: NURSE PRACTITIONER

## 2023-04-24 PROCEDURE — 21400001 HC TELEMETRY ROOM: Mod: HCNC

## 2023-04-24 PROCEDURE — 36415 COLL VENOUS BLD VENIPUNCTURE: CPT | Mod: HCNC | Performed by: NURSE PRACTITIONER

## 2023-04-24 PROCEDURE — 99233 SBSQ HOSP IP/OBS HIGH 50: CPT | Mod: HCNC,,, | Performed by: NURSE PRACTITIONER

## 2023-04-24 PROCEDURE — 85025 COMPLETE CBC W/AUTO DIFF WBC: CPT | Mod: HCNC | Performed by: NURSE PRACTITIONER

## 2023-04-24 PROCEDURE — 97116 GAIT TRAINING THERAPY: CPT | Mod: HCNC

## 2023-04-24 PROCEDURE — 96376 TX/PRO/DX INJ SAME DRUG ADON: CPT

## 2023-04-24 PROCEDURE — 25000003 PHARM REV CODE 250: Mod: HCNC | Performed by: NURSE PRACTITIONER

## 2023-04-24 PROCEDURE — 97530 THERAPEUTIC ACTIVITIES: CPT | Mod: HCNC,CO

## 2023-04-24 PROCEDURE — 99233 SBSQ HOSP IP/OBS HIGH 50: CPT | Mod: HCNC,,, | Performed by: INTERNAL MEDICINE

## 2023-04-24 PROCEDURE — 94640 AIRWAY INHALATION TREATMENT: CPT | Mod: HCNC

## 2023-04-24 PROCEDURE — 97530 THERAPEUTIC ACTIVITIES: CPT | Mod: HCNC

## 2023-04-24 PROCEDURE — 99900035 HC TECH TIME PER 15 MIN (STAT): Mod: HCNC

## 2023-04-24 PROCEDURE — 84100 ASSAY OF PHOSPHORUS: CPT | Mod: HCNC | Performed by: NURSE PRACTITIONER

## 2023-04-24 PROCEDURE — 96366 THER/PROPH/DIAG IV INF ADDON: CPT

## 2023-04-24 PROCEDURE — 63600175 PHARM REV CODE 636 W HCPCS: Mod: HCNC | Performed by: NURSE PRACTITIONER

## 2023-04-24 PROCEDURE — 97162 PT EVAL MOD COMPLEX 30 MIN: CPT | Mod: HCNC

## 2023-04-24 PROCEDURE — 99233 PR SUBSEQUENT HOSPITAL CARE,LEVL III: ICD-10-PCS | Mod: HCNC,,, | Performed by: INTERNAL MEDICINE

## 2023-04-24 PROCEDURE — 99222 1ST HOSP IP/OBS MODERATE 55: CPT | Mod: HCNC,,, | Performed by: INTERNAL MEDICINE

## 2023-04-24 RX ORDER — FLUCONAZOLE 200 MG/1
200 TABLET ORAL DAILY
Status: DISCONTINUED | OUTPATIENT
Start: 2023-04-25 | End: 2023-04-27 | Stop reason: HOSPADM

## 2023-04-24 RX ORDER — LACTULOSE 10 G/15ML
15 SOLUTION ORAL DAILY
Status: DISCONTINUED | OUTPATIENT
Start: 2023-04-24 | End: 2023-04-26

## 2023-04-24 RX ADMIN — ENOXAPARIN SODIUM 30 MG: 40 INJECTION SUBCUTANEOUS at 04:04

## 2023-04-24 RX ADMIN — ASPIRIN 81 MG CHEWABLE TABLET 81 MG: 81 TABLET CHEWABLE at 09:04

## 2023-04-24 RX ADMIN — LACTULOSE 15 G: 20 SOLUTION ORAL at 10:04

## 2023-04-24 RX ADMIN — MICONAZOLE NITRATE: 20 OINTMENT TOPICAL at 10:04

## 2023-04-24 RX ADMIN — FERROUS SULFATE TAB 325 MG (65 MG ELEMENTAL FE) 1 EACH: 325 (65 FE) TAB at 09:04

## 2023-04-24 RX ADMIN — FLUCONAZOLE 200 MG: 200 TABLET ORAL at 09:04

## 2023-04-24 RX ADMIN — DOCUSATE SODIUM 100 MG: 100 CAPSULE, LIQUID FILLED ORAL at 09:04

## 2023-04-24 RX ADMIN — FUROSEMIDE 40 MG: 10 INJECTION, SOLUTION INTRAMUSCULAR; INTRAVENOUS at 09:04

## 2023-04-24 RX ADMIN — MICONAZOLE NITRATE: 20 POWDER TOPICAL at 09:04

## 2023-04-24 RX ADMIN — IPRATROPIUM BROMIDE AND ALBUTEROL SULFATE 3 ML: .5; 3 SOLUTION RESPIRATORY (INHALATION) at 08:04

## 2023-04-24 RX ADMIN — PRAVASTATIN SODIUM 40 MG: 40 TABLET ORAL at 09:04

## 2023-04-24 RX ADMIN — DIPHENHYDRAMINE HYDROCHLORIDE, ZINC ACETATE: 2; .1 CREAM TOPICAL at 09:04

## 2023-04-24 RX ADMIN — ACETAMINOPHEN 1000 MG: 500 TABLET ORAL at 02:04

## 2023-04-24 RX ADMIN — PIPERACILLIN AND TAZOBACTAM 4.5 G: 4; .5 INJECTION, POWDER, LYOPHILIZED, FOR SOLUTION INTRAVENOUS; PARENTERAL at 02:04

## 2023-04-24 RX ADMIN — GUAIFENESIN 600 MG: 600 TABLET, EXTENDED RELEASE ORAL at 09:04

## 2023-04-24 RX ADMIN — ACETAMINOPHEN 1000 MG: 500 TABLET ORAL at 10:04

## 2023-04-24 RX ADMIN — GUAIFENESIN 600 MG: 600 TABLET, EXTENDED RELEASE ORAL at 10:04

## 2023-04-24 RX ADMIN — FLUTICASONE PROPIONATE 100 MCG: 50 SPRAY, METERED NASAL at 09:04

## 2023-04-24 RX ADMIN — PIPERACILLIN AND TAZOBACTAM 4.5 G: 4; .5 INJECTION, POWDER, LYOPHILIZED, FOR SOLUTION INTRAVENOUS; PARENTERAL at 10:04

## 2023-04-24 NOTE — PLAN OF CARE
PT eval.  Assisted to EOB and onto BSC.  Amb in room < 10' with RW and assist.  Desats into low to mid 80's but quick rebound back to >88%  REC:  post acute  DME:  defer to post acute  Problem: Physical Therapy  Goal: Physical Therapy Goal  Description: Goals to be met by: 23     Patient will increase functional independence with mobility by performin. Supine to sit with Modified [place]  2. Sit to supine with Modified [place]  3. Sit to stand transfer with Modified [place]  4. Bed to chair transfer with Supervision using Rolling Walker  5. Gait  x >100' feet with Supervision using Rolling Walker.     Outcome: Ongoing, Progressing

## 2023-04-24 NOTE — PROGRESS NOTES
Therapy with Vancomycin complete and/or consult discontinued by provider.  Pharmacy will sign off, please re-consult as needed.     Thanks,    Antwon Lane, Pharm.D

## 2023-04-24 NOTE — PT/OT/SLP PROGRESS
"Occupational Therapy   Treatment    Name: Jenniffer Jimenez  MRN: [id number]  Admitting Diagnosis:  Sepsis       Recommendations:     Discharge Recommendations: nursing facility, skilled  Discharge Equipment Recommendations:  to be determined by next level of care  Barriers to discharge:  Other (Comment) (Lives in [facility])    Assessment:     Jenniffer Jimenez is a 89 y.o. female with a medical diagnosis of Sepsis. Performance deficits affecting function are weakness, impaired endurance, impaired self care skills, impaired functional mobility, gait instability, impaired balance, impaired sensation, decreased coordination, decreased upper extremity function, decreased lower extremity function, decreased safety awareness, pain, impaired skin, edema, impaired coordination, decreased ROM, impaired cardiopulmonary response to activity.     Rehab Prognosis:  Fair; patient would benefit from acute skilled OT services to address these deficits and reach maximum level of function.       Plan:     Patient to be seen 4 x/week to address the above listed problems via self-care/home management, therapeutic activities, therapeutic exercises  Plan of Care Expires: 05/23/23  Plan of Care Reviewed with: patient    Subjective     Chief Complaint: "I get SOB sometimes"  Patient/Family Comments/goals: return to PLOF  Pain/Comfort:  Pain Rating 1:  (did not rate)    Objective:     Communicated with: nurseFunmi prior to session.  Patient found  on BSC  with telemetry, oxygen, pulse ox (continuous) upon OT entry to room.    General Precautions: Standard, fall, hearing impaired      Bed Mobility:    Patient completed Scooting/Bridging with dependent, 2 persons, and to HOB via drawsheet  Patient completed Sit to Supine with minimum assistance and with leg lift     Functional Mobility/Transfers:  Patient completed Toilet Transfer Step Transfer technique with contact guard assistance and minimum assistance with  hand-held " assist    Activities of Daily Living:  Toileting: maximal assistance hygiene /p urinating    [facility] 6 Click ADL: 17    Treatment & Education:  On arrival, patient seated on BSC and urinating. Became very SOB and tachypneic (though sats 88% and above); required max cues and visual demo of PLB technique. Patient completed t/f back to EOB > supine 2/2 feeling fatigued and SOB. Patient completed bed level BUE AROM (R sh limited ROM, but able to complete range to tolerance). Discussed post-acute therapy placement (patient initially wishing to return home to her cat; lives in [facility]) and all questions answered within OT scope of practice    Patient left HOB elevated with all lines intact, call button in reach, and bed alarm on    GOALS:   Multidisciplinary Problems       Occupational Therapy Goals          Problem: Occupational Therapy    Goal Priority Disciplines Outcome Interventions   Occupational Therapy Goal     OT, PT/OT Ongoing, Progressing    Description: Goals to be met by: 05/23/23     Patient will increase functional independence with ADLs by performing:    UE Dressing with Set-up Assistance.  LE Dressing with Minimal Assistance.  Grooming while EOB with Modified [place].  Toileting from bedside commode with Supervision for hygiene and clothing management.   Toilet transfer to bedside commode with Supervision.  Upper extremity exercise program 3x10 reps per handout, with supervision.                         Time Tracking:     OT Date of Treatment: 04/24/23  OT Start Time: 1017  OT Stop Time: 1041  OT Total Time (min): 24 min    Billable Minutes:Self Care/Home Management 12  Therapeutic Activity 12    OT/ROMERO: ROMERO     Number of ROMERO visits since last OT visit: 1    4/24/2023

## 2023-04-24 NOTE — ASSESSMENT & PLAN NOTE
"Patient with Persistent (7 days or more) atrial fibrillation which is controlled currently with Calcium Channel Blocker. Patient is currently in atrial fibrillation.[id number] Score: 4. HASBLED Score:  Anticoagulation not on anticoagulation due to watchman device, on asa only     Normally rate controlled   afib rvr in the ED   IV cardizem given, BB on allergy list   6 beat run of v-tach overnight 4/22, pt asymptomatic    Cardiology consulted currently rate controlled and BP stable  -Can use CCB (PO dilt) if rate control needed in light of BB "allergy"  "

## 2023-04-24 NOTE — ASSESSMENT & PLAN NOTE
- on Pravastatin as an outpatient  - goal LDL less than   - continue Pravastatin; cholesterol controlled

## 2023-04-24 NOTE — ASSESSMENT & PLAN NOTE
hgb 9.8--> 7.3--> 8.3  Anemia panel   Stool for OCB   Type and screen   UA without blood   Close monitoring   Transfuse if Hgb < 7 or symptomatic     Lab Results   Component Value Date    IRON <10 (L) 04/22/2023    TRANSFERRIN 165 (L) 04/22/2023    TIBC 244 (L) 04/22/2023    FESATURATED Unable to calculate 04/22/2023      Add iron PO

## 2023-04-24 NOTE — NURSING
Patient tolerating getting up to the bedside commode without getting anxious and increased SOB. Remains on 2L via NC, saturations % overnight.Telemetry in progress. Blood glucose checked as ordered. Safety maintained, call light in reach, bed alarm in use.

## 2023-04-24 NOTE — TELEPHONE ENCOUNTER
----- Message from Paige Corrigan sent at 4/24/2023  1:28 PM CDT -----  Contact: [phone]  Pt is scheduled to see Dr Fagan tomorrow but she is currently hospitalized at [facility] in [place] and then will be in rehab for 2 weeks. She would like a call to discuss rs after that. There were no appts that showed up available. Can you please call her to assist? Thanks

## 2023-04-24 NOTE — CONSULTS
[facility] Surg  Wound Care    Patient Name:  Jenniffer Jimenez   MRN:  [id number]  Date: 2023  Diagnosis: Sepsis    History:     Past Medical History:   Diagnosis Date    Amblyopia of left eye 04/10/2013    Arthritis     Facet arthropathy, Lumbosacral    Atherosclerosis of aorta     Cataract     Central retinal vein occlusion of left eye     CKD (chronic kidney disease) stage 3, GFR 30-59 ml/min     Diabetes mellitus, type 2     Diabetic polyneuropathy 2022    Essential (primary) hypertension     Exotropia of both eyes 2013    recession RSR 5.0mm w/ adj; recession LR os 5.0 w/ adj; resect MR os  4.0mm    Hearing loss     History of resection of small bowel     Hypertensive retinopathy of both eyes     Hypoglycemia     Macular degeneration     OA (osteoarthritis) of shoulder     Right    Osteoporosis     Paroxysmal atrial fibrillation     Posterior vitreous detachment of both eyes     Psychiatric problem     Rhinitis     TIA (transient ischemic attack)        Social History     Socioeconomic History    Marital status:    Occupational History    Occupation: retired   Tobacco Use    Smoking status: Former     Types: Cigarettes     Quit date: 10/29/1982     Years since quittin.5     Passive exposure: Never    Smokeless tobacco: Never   Substance and Sexual Activity    Alcohol use: Not Currently     Alcohol/week: 2.0 standard drinks     Types: 2 Shots of liquor per week     Comment: rare    Drug use: No    Sexual activity: Never   Other Topics Concern    Are you pregnant or think you may be? No    Breast-feeding No     Social Determinants of Health     Financial Resource Strain: Low Risk     Difficulty of Paying Living Expenses: Not hard at all   Food Insecurity: No Food Insecurity    Worried About Running Out of Food in the Last Year: Never true    Ran Out of Food in the Last Year: Never true   Transportation Needs: No Transportation Needs    Lack of Transportation (Medical): No    Lack  of Transportation (Non-Medical): No   Physical Activity: Inactive    Days of Exercise per Week: 0 days    Minutes of Exercise per Session: 0 min   Stress: Stress Concern Present    Feeling of Stress : To some extent   Social Connections: Socially Isolated    Frequency of Communication with Friends and Family: Once a week    Frequency of Social Gatherings with Friends and Family: Once a week    Attends Zoroastrian Services: Never    Active Member of Clubs or Organizations: Yes    Attends Club or Organization Meetings: Never    Marital Status:    Housing Stability: Low Risk     Unable to Pay for Housing in the Last Year: No    Number of Places Lived in the Last Year: 1    Unstable Housing in the Last Year: No       Precautions:     Allergies as of 04/21/2023 - Reviewed 04/21/2023   Allergen Reaction Noted    Opioids - morphine analogues Other (See Comments) 08/15/2018    Tizanidine Other (See Comments) 04/06/2018    Tramadol Hallucinations 01/18/2013    Beta-blockers (beta-adrenergic blocking agts) Other (See Comments) 10/23/2013    Morphine  12/24/2021    Opioids-meperidine and related  01/04/2022    Ciprofloxacin Rash 12/28/2022       [facility] Assessment Details/Treatment       RLE- crusty dry skin- erythema- appearance of venous dermatitis with fungal component        LLE- crusty dry skin- appearance of venous dermatitis with fungal component- blood blister proximally- not related to pressure        Perineum/ pannus/inner thighs- fungal appearing rash          Back- fungal appearing rash        Bilateral buttocks- blanchable redness with fungal appearing rash      Recommendations discussed with pt, nurse and YINKA Hernandez NP:  - Nursing to apply waffle overlay to bed surface to maintain pressure injury prevention interventions  - D/c miconazole powder and banophen ointment and begin miconazole moisture barrier BID to perineum/pannus/inner thighs/buttock  - BLE- moisturizer to dry skin- xeroform to redness/wounds  daily    04/24/2023

## 2023-04-24 NOTE — PLAN OF CARE
Problem: Adult Inpatient Plan of Care  Goal: Plan of Care Review  4/24/2023 0403 by Camilla Wilhelm RN  Outcome: Ongoing, Progressing     Problem: Infection Progression (Sepsis/Septic Shock)  Goal: Absence of Infection Signs and Symptoms  4/24/2023 0403 by Camilla Wilhelm RN  Outcome: Ongoing, Progressing     Problem: Impaired Wound Healing  Goal: Optimal Wound Healing  4/24/2023 0403 by Camilla Wilhelm RN  Outcome: Ongoing, Progressing     Problem: Fall Injury Risk  Goal: Absence of Fall and Fall-Related Injury  4/24/2023 0403 by Camilla Wilhelm RN  Outcome: Ongoing, Progressing     Problem: Skin Injury Risk Increased  Goal: Skin Health and Integrity  4/24/2023 0403 by Camilla Wilhelm RN  Outcome: Ongoing, Progressing     Problem: Gas Exchange Impaired  Goal: Optimal Gas Exchange  Outcome: Ongoing, Progressing

## 2023-04-24 NOTE — PT/OT/SLP PROGRESS
Occupational Therapy  Visit Attempt    Patient Name:  Jenniffer Jimenez   MRN:  [id number]    Patient not seen today secondary to Other (Comment) (9:09 - Patient eating breakfast and requested therapy return later.).     4/24/2023

## 2023-04-24 NOTE — PROGRESS NOTES
[facility]  Cardiology  Progress Note    Patient Name: Jenniffer Jimenez  MRN: [id number]  Admission Date: 4/21/2023  Hospital Length of Stay: 0 days  Code Status: Full Code   Attending Physician: Christy Iniguez MD   Primary Care Physician: Rubi Young DO  Expected Discharge Date:   Principal Problem:Sepsis    Subjective:     Hospital Course:   Per consult noted by Dr. Casas 4/22/223-4/23/2023 89 y/o female who presented with worsening SOB and LE edema. She has a hx of Afib no AC s/p LAAO device (Watchman), HFpEF, HLD, DM, HTN, TIA. Has been having issues with LE cellulitis and volume status. In ED afib with RVR and given Dilt IV x 1 with improvement. HR ON 's. Fever 101.8. Tele with afib with 6 beat NSVT. Last 2DE with EF 55%. Arterial doppler reviewed.      4/23/2023 Overnight no acute events. No significant output documented. Audibly SOB.  4/24/2023  Remains on IV Lasix daily with 950cc out overnight negative 251cc since admission. HR and BP stable. BMP WNL. CBC with H&H 8.3 & 25.4 this AM. Remains rate controlled in afib with HR 70s             Review of Systems   Constitutional: Negative for chills, decreased appetite, diaphoresis and fever.   Cardiovascular:  Negative for chest pain, claudication, cyanosis, dyspnea on exertion, irregular heartbeat, leg swelling, near-syncope, orthopnea, palpitations, paroxysmal nocturnal dyspnea and syncope.   Respiratory:  Negative for cough, hemoptysis, shortness of breath and wheezing.    Skin:  Positive for poor wound healing.   Gastrointestinal:  Negative for bloating, abdominal pain, constipation, diarrhea, melena, nausea and vomiting.   Neurological:  Negative for dizziness and weakness.   Objective:     Vital Signs (Most Recent):  Temp: 97.4 °F (36.3 °C) (04/24/23 1144)  Pulse: 71 (04/24/23 1205)  Resp: 20 (04/24/23 1144)  BP: (!) 115/55 (04/24/23 1144)  SpO2: (!) 94 % (04/24/23 1144)   Vital Signs (24h Range):  Temp:  [97.4 °F (36.3 °C)-98.4 °F  (36.9 °C)] 97.4 °F (36.3 °C)  Pulse:  [62-76] 71  Resp:  [16-20] 20  SpO2:  [94 %-100 %] 94 %  BP: (100-126)/(49-61) 115/55     Weight: 70.8 kg (156 lb)  Body mass index is 25.96 kg/m².     SpO2: (!) 94 %         Intake/Output Summary (Last 24 hours) at 4/24/2023 1527  Last data filed at 4/24/2023 1200  Gross per 24 hour   Intake 711.27 ml   Output 1000 ml   Net -288.73 ml       Lines/Drains/Airways       Peripheral Intravenous Line  Duration                  Peripheral IV - Single Lumen 04/21/23 1131 24 G Anterior;Distal;Right Forearm 3 days         Peripheral IV - Single Lumen 04/21/23 1515 20 G Left Antecubital 3 days                    Physical Exam  Constitutional:       General: She is not in acute distress.     Appearance: She is well-developed.   Cardiovascular:      Rate and Rhythm: Normal rate and regular rhythm.      Heart sounds: No murmur heard.    No gallop.   Pulmonary:      Effort: Pulmonary effort is normal. No respiratory distress.      Breath sounds: Normal breath sounds. No wheezing.   Abdominal:      General: Bowel sounds are normal. There is no distension.      Palpations: Abdomen is soft.      Tenderness: There is no abdominal tenderness.   Skin:     General: Skin is warm and dry.   Neurological:      Mental Status: She is alert and oriented to person, place, and time.       Significant Labs: BMP:   Recent Labs   Lab 04/23/23  0642 04/24/23  0529   GLU 95 95    139   K 4.2 3.7    103   CO2 23 26   BUN 26 28   CREATININE 1.2 1.3   CALCIUM 7.9* 8.2*   MG 2.0 1.9    and CBC   Recent Labs   Lab 04/23/23  0642 04/24/23  0529   WBC 3.63* 3.35*   HGB 7.9* 8.3*   HCT 24.3* 25.4*    185       Significant Imaging: Echocardiogram: Transthoracic echo (TTE) complete (Cupid Only):   Results for orders placed or performed during the hospital encounter of 04/21/23   Echo   Result Value Ref Range    BSA 1.8 m2    TDI SEPTAL 0.14 m/s    LA WIDTH 4.20 cm    IVC diameter 2.21 cm    Left  Ventricular Outflow Tract Mean Velocity 0.69 cm/s    Left Ventricular Outflow Tract Mean Gradient 2.10 mmHg    TDI LATERAL 0.16 m/s    LVIDd 4.59 3.5 - 6.0 cm    IVS 1.19 (A) 0.6 - 1.1 cm    Posterior Wall 1.21 (A) 0.6 - 1.1 cm    Ao root annulus 3.45 cm    LVIDs 3.19 2.1 - 4.0 cm    FS 31 28 - 44 %    LA volume 101.04 cm3    LV mass 204.30 g    LA size 4.72 cm    RVDD 3.14 cm    RV S' 0.01 cm/s    Left Ventricle Relative Wall Thickness 0.53 cm    AV mean gradient 4 mmHg    AV valve area 2.18 cm2    AV Velocity Ratio 0.66     AV index (prosthetic) 0.77     Mean e' 0.15 m/s    IVRT 65.65 msec    Pulm vein S/D ratio 0.49     LVOT diameter 1.90 cm    LVOT area 2.8 cm2    LVOT peak rg 0.96 m/s    LVOT peak VTI 21.20 cm    Ao peak rg 1.46 m/s    Ao VTI 27.5 cm    Mr max rg 3.07 m/s    LVOT stroke volume 60.08 cm3    AV peak gradient 9 mmHg    TR Max Rg 3.22 m/s    PV Peak S Rg 0.29 m/s    PV Peak D Rg 0.59 m/s    LV Systolic Volume 40.59 mL    LV Systolic Volume Index 22.8 mL/m2    LV Diastolic Volume 96.67 mL    LV Diastolic Volume Index 54.31 mL/m2    LA Volume Index 56.8 mL/m2    LV Mass Index 115 g/m2    RA Major Axis 6.31 cm    Left Atrium Minor Axis 5.85 cm    Left Atrium Major Axis 6.15 cm    Triscuspid Valve Regurgitation Peak Gradient 41 mmHg    LA Volume Index (Mod) 48.7 mL/m2    LA volume (mod) 86.68 cm3    RA Width 4.50 cm    Gill's Biplane MOD Ejection Fraction 5 %    Right Atrial Pressure (from IVC) 15 mmHg    EF 55 %    TV rest pulmonary artery pressure 56 mmHg    Narrative    · The left ventricle is normal in size with concentric hypertrophy and   normal systolic function.  · The estimated ejection fraction is 55%.  · Indeterminate left ventricular diastolic function.  · With normal right ventricular systolic function.  · Severe left atrial enlargement.  · Severe right atrial enlargement.  · Mild mitral regurgitation.  · Mild to moderate tricuspid regurgitation.  · There is pulmonary  hypertension.  · The estimated PA systolic pressure is 56 mmHg.  · Elevated central venous pressure (15 mmHg).        Assessment and Plan:     Brief HPI: Seen this afternoon on rounds with Dr. Casas while resting in bed. Reports feeling better today and appears better on Dr. Casas's assessment in comparison to yesterday.Discussed POC as detailed below-verbalized understanding and agrees with POC     Chronic diastolic heart failure  - echo with normal LVEF; reports drinking fluids (Gatorade) as an outpatient likely the cause of ADHF  - diuresis in process with IV lasix 40mg daily with 950cc out overnight negative 251cc since admission ?accuracy  - reports SOB improving; appears improved from yesterday  - goal BP less than 130/80and well controlled  - anticipate transition to oral Lasix soon; recommend slight up titration to 40mg po daily with close monitoring     Chronic atrial fibrillation  - rate controlled with HR 70s  - not on BB as an outpatient  - no chronic OAC given Watchman      Primary hypertension  - SBP 110s-120s overnight  - goal BP less than 130/80  - no antihypertensives noted on home med rec  - BP well controlled; continue to monitor     Pure hypercholesterolemia  - on Pravastatin as an outpatient  - goal LDL less than   - continue Pravastatin; cholesterol controlled         VTE Risk Mitigation (From admission, onward)         Ordered     enoxaparin injection 30 mg  Daily         04/23/23 0636     IP VTE HIGH RISK PATIENT  Once         04/21/23 1507     Place sequential compression device  Until discontinued         04/21/23 1507                VANESSA Miranda, ANP  Cardiology  [facility]

## 2023-04-24 NOTE — ASSESSMENT & PLAN NOTE
Patient is identified as having Diastolic (HFpEF) heart failure that is acute on Chronic. CHF is currently uncontrolled. Latest ECHO performed and demonstrates- Results for orders placed during the hospital encounter of 10/07/22    Echo     The left ventricle is normal in size with concentric hypertrophy and normal systolic function.   The estimated ejection fraction is 55%.   Indeterminate left ventricular diastolic function.   With normal right ventricular systolic function.   Severe left atrial enlargement.   Severe right atrial enlargement.   Mild mitral regurgitation.   Mild to moderate tricuspid regurgitation.   There is pulmonary hypertension.   The estimated PA systolic pressure is 56 mmHg.   Elevated central venous pressure (15 mmHg  . Continue Furosemide and monitor clinical status closely. Monitor on telemetry. Patient is off CHF pathway.  Monitor strict Is&Os and daily weights.  Place on fluid restriction of 1.5 L. Continue to stress to patient importance of self efficacy and  on diet for CHF. Last BNP reviewed- and noted below   Recent Labs   Lab 04/21/23  1132   *   .    S/p sepsis IVF. Appears volume overloaded - lasix 20 mg IV x1  4/22 then may continue lasix at home dose PO     Strict I&O    Low na diet   Following cardiology recs       Continue lasix at 40 mg IV daily - patient needs increased diuresis   Incentive spirometry   CXR: The heart is enlarged.  Calcified atheromatous disease affects the aorta.  There is pulmonary vascular congestion with mild edema.  A left-sided pleural effusion is present.  Findings appear slightly worsened as compared to the previous study of 04/21/2023 4/24  Lung sounds improving, however still with some crackles, cough and SOB, improving from yesterday, but not at baseline. On 2L NC. Does not wear home oxygen  Continue diuresis      4/25  -continue diuresis today; some significant wheezing on exam   -weaning oxygen down as tolerated

## 2023-04-24 NOTE — PLAN OF CARE
SW attempted assessment. SW noted therapy to be at bedside. SW to review notes and assess later.      Future Appointments   Date Time Provider Department Center   4/25/2023 11:00 AM Rani Fagan MD [facility] PRICRE [place]   4/26/2023 12:30 PM LAB, [facility] LABDRA [place]   5/4/2023  3:00 PM KIMBERLY Lopez [facility] AUDIO [place]   6/15/2023 11:15 AM Lennie Louis DPM [facility] POD [place]   6/29/2023  8:30 AM Rachael Case MD [facility] IM [place]   7/12/2023 11:30 AM Brent Chapman Jr., MD [facility] CARDIO [place]     Martha Lemus [facility]  [facility]  [phone]

## 2023-04-24 NOTE — PROGRESS NOTES
[facility]  Progress Note    Patient Name: Jenniffer Jimenez  MRN: [id number]  Patient Class: OP- Observation   Admission Date: 4/21/2023  Length of Stay: 0 days  Attending Physician: Christy Iniguez MD  Primary Care Provider: Rubi Young DO        Subjective:     Principal Problem:Sepsis        HPI:  89 yo female with a PMH of cataract, [place], CKD, DM II, diabetic polyneuropathy, HTN, PAF, TIA presents to the ED For evaluation of fever noted by home health nurse. RLE appears edematous, erythematous, weeping worse for unknown duration. She also has a rash to her perineal area and back. She notes pain to the RLE. Since in the ED she was noted to be in afib RVR, which is now rate controlled with a dose of IV cardizem. Patient is a poor historian, yelling, hx of psychiatric problem.     I tried to phone POA for further hx- no answer.     Current OP medications as reviewed per recent cardiology note:       acetaminophen (TYLENOL) 500 MG tablet, Take 1,000 mg by mouth 2 (two) times a day., Disp: , Rfl:     aspirin (ECOTRIN) 81 MG EC tablet, Take 1 tablet (81 mg total) by mouth once daily., Disp: 90 tablet, Rfl: 3    diclofenac sodium (VOLTAREN) 1 % Gel, Apply 2 g topically 4 (four) times daily. Use gloves to apply to right hip for 10 days, Disp: 100 g, Rfl: 1    miconazole NITRATE 2 % (MICOTIN) 2 % top powder, Apply topically 2 (two) times daily. To RLE., Disp: , Rfl: 0    nystatin (MYCOSTATIN) powder, Apply topically 2 (two) times daily., Disp: 30 g, Rfl: 1    pravastatin (PRAVACHOL) 40 MG tablet, Take 1 tablet (40 mg total) by mouth once daily., Disp: 90 tablet, Rfl: 3    silver sulfADIAZINE 1% (SILVADENE) 1 % cream, Apply to RLE skin breakdown twice daily, Disp: , Rfl:     triamcinolone acetonide 0.5% (KENALOG) 0.5 % Crea, Apply to legs with dressing changes., Disp: 454 g, Rfl: 0    vit C/E/Zn/coppr/lutein/zeaxan (OCUVITE LUTEIN AND ZEAXANTHIN ORAL), Take 1 capsule by mouth once daily.  Lutien 25 mg, Zeaxanthin 5 mg, Disp: , Rfl:     vitamin E 400 UNIT capsule, Take 400 Units by mouth once daily., Disp: , Rfl:     furosemide (LASIX) 20 MG tablet, Take 1 tablet (20 mg total) by mouth once daily. Hold for weights 150 lb or less, Disp: 30 tablet, Rfl: 11    QUEtiapine (SEROQUEL) 25 MG Tab, Take 1 tablet (25 mg total) by mouth every evening. (Patient not taking: Reported on 4/12/2023), Disp: 30 tablet, Rfl: 11    sodium zirconium cyclosilicate (LOKELMA) 5 gram packet, Take 1 packet (5 g total) by mouth 2 (two) times a day. Mix entire contents of packet(s) into drinking glass containing 3 tablespoons of water; stir well and drink immediately. Add water and repeat until no powder remains to receive entire dose., Disp: 60 packet, Rfl: 3       Overview/Hospital Course:  No notes on file    Interval hx: this am patient is more calm and cooperative.She has some SOB with a dry cough, starting to improve. She has pain to RLE. She denies bleeding aside from minimal bleed to RLE wound. No BM     Objective:     Vital Signs (Most Recent):  Temp: 97.4 °F (36.3 °C) (04/24/23 1144)  Pulse: 75 (04/24/23 1144)  Resp: 20 (04/24/23 1144)  BP: (!) 115/55 (04/24/23 1144)  SpO2: (!) 94 % (04/24/23 1144)   Vital Signs (24h Range):  Temp:  [97.4 °F (36.3 °C)-98.4 °F (36.9 °C)] 97.4 °F (36.3 °C)  Pulse:  [62-76] 75  Resp:  [16-20] 20  SpO2:  [94 %-100 %] 94 %  BP: (100-126)/(49-61) 115/55     Weight: 70.8 kg (156 lb)  Body mass index is 25.96 kg/m².    Physical Exam  Constitutional:       Appearance: She is ill-appearing.   HENT:      Head: Normocephalic and atraumatic.      Mouth/Throat:      Pharynx: Oropharynx is clear.   Eyes:      Conjunctiva/sclera: Conjunctivae normal.   Cardiovascular:      Rate and Rhythm: regular rate  present. Rhythm irregular.      Pulses: Normal pulses.      Heart sounds: Normal heart sounds.   Pulmonary:      Effort: Pulmonary effort is normal.      Comments: Coarse, but improving   Abdominal:       General: Abdomen is flat. Bowel sounds are normal.      Palpations: Abdomen is soft.   Musculoskeletal:         General: Swelling and tenderness present.      Cervical back: Normal range of motion and neck supple.   Skin:     General: Skin is warm and dry.      Comments: RLE edematous, erythematous, weeping, tender, warm  Palpable pulse     LLE with skin tear and rash, does not appear infected     Noted yeast type rash to perineal area while she is sitting on BS commode     Erythematous rash to back    Neurological:      General: No focal deficit present.      Mental Status: She is alert.   Psychiatric:         Mood and Affect: Mood is not anxious.         Behavior: Behavior is not agitated.           Significant Labs: All pertinent labs within the past 24 hours have been reviewed.    Significant Imaging: I have reviewed all pertinent imaging results/findings within the past 24 hours.  Review of Systems      Assessment/Plan:      * Sepsis  This patient does have evidence of infective focus  My overall impression is sepsis.  Source: Skin and Soft Tissue (location RLE)  Antibiotics given-   Antibiotics (72h ago, onward)      Start     Stop Route Frequency Ordered    04/24/23 1600  vancomycin 1,250 mg in dextrose 5 % (D5W) 250 mL IVPB (Vial-Mate)         -- IV Every 24 hours (non-standard times) 04/23/23 1722    04/21/23 2330  piperacillin-tazobactam (ZOSYN) 4.5 g in dextrose 5 % in water (D5W) 5 % 100 mL IVPB (MB+)         -- IV Every 8 hours (non-standard times) 04/21/23 1505    04/21/23 1600  vancomycin - pharmacy to dose  (vancomycin IVPB)        See Hyperspace for full Linked Orders Report.    -- IV pharmacy to manage frequency 04/21/23 1505          Latest lactate reviewed-  Recent Labs   Lab 04/21/23  1457   LACTATE 2.2     Organ dysfunction indicated by no organ dysfunction     Fluid challenge Actual Body weight- Patient will receive 30ml/kg actual body weight to calculate fluid bolus for treatment of septic  shock.     Post- resuscitation assessment Yes Perfusion exam was performed within 6 hours of septic shock presentation after bolus shows Adequate tissue perfusion assessed by non-invasive monitoring       Will Not start Pressors- Levophed for MAP of 65  Source control achieved by: IV vancomycin and zosyn     Wound care   PRN tylenol for pain or fever. Patient has multiple allergies  Diflucan and topical tx for yeast   Blood cx pending NGTD    ESR, CRP - elevated, xray RLE to r/o osteo with chronic wound. No evidence of osteo. Will MRI given ESR, CRP elevated       -given multiple positive wound cx in the past to lower extremities will consult ID to guide abx        PT/OT for mobility- recommending SNF     Constipation    Add colace   Add lactulose     Microcytic anemia    hgb 9.8--> 7.3--> 8.3  Anemia panel   Stool for OCB   Type and screen   UA without blood   Close monitoring   Transfuse if Hgb < 7 or symptomatic     Lab Results   Component Value Date    IRON <10 (L) 04/22/2023    TRANSFERRIN 165 (L) 04/22/2023    TIBC 244 (L) 04/22/2023    FESATURATED Unable to calculate 04/22/2023      Add iron PO     Venous insufficiency of both lower extremities    Palpable R dorsalis pedal pulse   Will US RLE arterial for further vascular assessment   No evidence of high-grade stenosis of the right lower extremity arteries.  2. Monophasic waveforms in the popliteal artery and calf arteries, likely related to vessel hardening from atherosclerotic disease.    Check venous doppler to RO DVT No evidence of deep venous thrombosis in the right lower extremity.     Right lower extremity nonspecific subcutaneous edema, with right groin few prominent but nonenlarged lymph nodes.    Prediabetes    Lab Results   Component Value Date    HGBA1C 6.0 (H) 01/26/2023     Low CSI   Consistent carb diet     History of TIA (transient ischemic attack)    Resume asa, statin     Chronic diastolic heart failure    Patient is identified as having  Diastolic (HFpEF) heart failure that is acute on Chronic. CHF is currently uncontrolled. Latest ECHO performed and demonstrates- Results for orders placed during the hospital encounter of 10/07/22    Echo    The left ventricle is normal in size with concentric hypertrophy and normal systolic function.  The estimated ejection fraction is 55%.  Indeterminate left ventricular diastolic function.  With normal right ventricular systolic function.  Severe left atrial enlargement.  Severe right atrial enlargement.  Mild mitral regurgitation.  Mild to moderate tricuspid regurgitation.  There is pulmonary hypertension.  The estimated PA systolic pressure is 56 mmHg.  Elevated central venous pressure (15 mmHg  . Continue Furosemide and monitor clinical status closely. Monitor on telemetry. Patient is off CHF pathway.  Monitor strict Is&Os and daily weights.  Place on fluid restriction of 1.5 L. Continue to stress to patient importance of self efficacy and  on diet for CHF. Last BNP reviewed- and noted below   Recent Labs   Lab 04/21/23  1132   *   .    S/p sepsis IVF. Appears volume overloaded - lasix 20 mg IV x1  4/22 then may continue lasix at home dose PO     Strict I&O    Low na diet   Following cardiology recs       Continue lasix at 40 mg IV daily - patient needs increased diuresis   Incentive spirometry   CXR: The heart is enlarged.  Calcified atheromatous disease affects the aorta.  There is pulmonary vascular congestion with mild edema.  A left-sided pleural effusion is present.  Findings appear slightly worsened as compared to the previous study of 04/21/2023 4/24  Lung sounds improving, however still with some crackles, cough and SOB, improving from yesterday, but not at baseline.   On 2L NC. Does not wear home oxygen  Continue diuresis       Stage 3b chronic kidney disease    Cr 1.1  Baseline     Chronic atrial fibrillation  Patient with Persistent (7 days or more) atrial fibrillation which is  "controlled currently with Calcium Channel Blocker. Patient is currently in atrial fibrillation.[id number] Score: 4. HASBLED Score:  Anticoagulation not on anticoagulation due to watchman device, on asa only     Normally rate controlled   afib rvr in the ED   IV cardizem given, BB on allergy list   6 beat run of v-tach overnight 4/22, pt asymptomatic    Cardiology consulted currently rate controlled and BP stable  -Can use CCB (PO dilt) if rate control needed in light of BB "allergy"    Primary hypertension    Hold medications for now    Pure hypercholesterolemia    Ok to resume statin       VTE Risk Mitigation (From admission, onward)           Ordered     enoxaparin injection 30 mg  Daily         04/23/23 0636     IP VTE HIGH RISK PATIENT  Once         04/21/23 1507     Place sequential compression device  Until discontinued         04/21/23 1507                    Discharge Planning   GILES:      Code Status: Full Code   Is the patient medically ready for discharge?:     Reason for patient still in hospital (select all that apply): Patient trending condition                     Amna Hernandez NP  Department of Hospital Medicine   [facility]  "

## 2023-04-24 NOTE — CONSULTS
[facility]  Cardiology  Consult Note    Patient Name: Jenniffer Jimenez  MRN: [id number]  Admission Date: 4/21/2023  Hospital Length of Stay: 0 days  Code Status: Full Code   Attending Provider: Christy Iniguez MD   Consulting Provider: VANESSA Miranda, ANP  Primary Care Physician: Rubi Young DO  Principal Problem:Sepsis      Inpatient consult to Cardiology-[facility]  Consult performed by: VANESSA Rowan, ANP  Consult ordered by: Amna Hernandez NP        See full consult note dated 4/22/2023 by Dr. Joe Casas

## 2023-04-24 NOTE — PLAN OF CARE
Patient received on  1  Lpm NC with SpO2    95%. Pt with no apparent distress noted. Will continue to monitor.

## 2023-04-24 NOTE — ASSESSMENT & PLAN NOTE
- SBP 110s-120s overnight  - goal BP less than 130/80  - no antihypertensives noted on home med rec  - BP well controlled; continue to monitor

## 2023-04-24 NOTE — PROGRESS NOTES
[facility]                   Pharmacy  Pharmacy Vancomycin/AG Sign-off    Therapy with vancomycin completed and/or consult discontinued by provider.  Pharmacy will sign off, please re-consult as needed. Thank you for allowing us to participate in this patient's care.     Rome Mcneil, PharmD    [phone]

## 2023-04-24 NOTE — PROGRESS NOTES
[place] [facility] Surg  Cardiology  Progress Note    Patient Name: Jenniffer Jimenez  MRN: [id number]  Admission Date: 4/21/2023  Hospital Length of Stay: 0 days  Code Status: Full Code   Attending Provider: Christy Iniguez MD   Consulting Provider: Joe Casas MD  Primary Care Physician: Rubi Young DO  Principal Problem:Sepsis    Patient information was obtained from patient and ER records.     Consults  Subjective:     Chief Complaint:  SOB, LE edema     HPI:   89 y/o female who presented with worsening SOB and LE edema. She has a hx of Afib no AC s/p LAAO device (Watchman), HFpEF, HLD, DM, HTN, TIA. Has been having issues with LE cellulitis and volume status. In ED afib with RVR and given Dilt IV x 1 with improvement. HR ON 's. Fever 101.8. Tele with afib with 6 beat NSVT. Last 2DE with EF 55%. Arterial doppler reviewed.     4/23/2023:  Overnight no acute events. No significant output documented. Audibly SOB.    Past Medical History:   Diagnosis Date    Amblyopia of left eye 04/10/2013    Arthritis     Facet arthropathy, Lumbosacral    Atherosclerosis of aorta     Cataract     Central retinal vein occlusion of left eye     CKD (chronic kidney disease) stage 3, GFR 30-59 ml/min     Diabetes mellitus, type 2     Diabetic polyneuropathy 01/06/2022    Essential (primary) hypertension     Exotropia of both eyes 02/06/2013    recession RSR 5.0mm w/ adj; recession LR os 5.0 w/ adj; resect MR os  4.0mm    Hearing loss     History of resection of small bowel     Hypertensive retinopathy of both eyes     Hypoglycemia     Macular degeneration     OA (osteoarthritis) of shoulder     Right    Osteoporosis     Paroxysmal atrial fibrillation     Posterior vitreous detachment of both eyes     Psychiatric problem     Rhinitis     TIA (transient ischemic attack)        Past Surgical History:   Procedure Laterality Date    APPENDECTOMY      CARDIAC CATHETERIZATION      CATARACT EXTRACTION W/  INTRAOCULAR LENS IMPLANT  "Bilateral      SECTION, CLASSIC      CLOSURE OF LEFT ATRIAL APPENDAGE USING DEVICE N/A 2020    Procedure: Left atrial appendage closure device;  Surgeon: Abundio Curtis MD;  Location: [facility] CATH LAB;  Service: Cardiology;  Laterality: N/A;    HYSTERECTOMY      INNER EAR SURGERY      JOINT REPLACEMENT      LEFT KNEE REPLACEMENT IN 2013 -    OOPHORECTOMY      SINUS SURGERY      STRABISMUS SURGERY  13    RSR recession 5 mm, LLR recession 5 mm and LMR resection 4mm    STRABISMUS SURGERY  2014    recess LR OD 6mm    TONSILLECTOMY      watchman surgery N/A 2020       Review of patient's allergies indicates:   Allergen Reactions    Opioids - morphine analogues Other (See Comments)     Bowel issues; bowel obstruction    Tizanidine Other (See Comments)     "Lips were numb,  Almost passed out."    Tramadol Hallucinations    Beta-blockers (beta-adrenergic blocking agts) Other (See Comments)     Can not go on beta blockers for long period of time - due to taking allergy injections    Morphine     Opioids-meperidine and related     Ciprofloxacin Rash       No current facility-administered medications on file prior to encounter.     Current Outpatient Medications on File Prior to Encounter   Medication Sig    acetaminophen (TYLENOL) 500 MG tablet Take 1,000 mg by mouth 2 (two) times a day.    aspirin (ECOTRIN) 81 MG EC tablet Take 1 tablet (81 mg total) by mouth once daily.    diclofenac sodium (VOLTAREN) 1 % Gel Apply 2 g topically 4 (four) times daily. Apply to right hip    furosemide (LASIX) 20 MG tablet Take 1 tablet (20 mg total) by mouth once daily. Hold for weights 150 lb or less, if weight is 155 or over, take 2 tablets in the morning    nystatin (MYCOSTATIN) powder Apply topically 2 (two) times daily.    pravastatin (PRAVACHOL) 40 MG tablet Take 1 tablet (40 mg total) by mouth once daily.    propylene glycol (SYSTANE COMPLETE OPHT) Apply to eye.    silver sulfADIAZINE 1% (SILVADENE) 1 % cream " Apply to RLE skin breakdown twice daily (Patient taking differently: Apply topically 2 (two) times daily. Apply to RLE skin breakdown twice daily)    vitamin E 400 UNIT capsule Take 400 Units by mouth once daily.    QUEtiapine (SEROQUEL) 25 MG Tab Take 1 tablet (25 mg total) by mouth every evening. (Patient not taking: Reported on 2023)    sodium zirconium cyclosilicate (LOKELMA) 5 gram packet Take 1 packet (5 g total) by mouth 2 (two) times a day. Mix entire contents of packet(s) into drinking glass containing 3 tablespoons of water; stir well and drink immediately. Add water and repeat until no powder remains to receive entire dose. (Patient not taking: Reported on 2023)     Family History       Problem Relation (Age of Onset)    Breast cancer Maternal Aunt    Diabetes Sister, Brother    Heart disease Sister, Sister    Hypertension Mother, Father    Liver disease Sister    No Known Problems Maternal Uncle, Paternal Aunt, Paternal Uncle, Maternal Grandmother, Maternal Grandfather, Paternal Grandmother, Paternal Grandfather, Daughter, Son, Son, Son          Tobacco Use    Smoking status: Former     Types: Cigarettes     Quit date: 10/29/1982     Years since quittin.5     Passive exposure: Never    Smokeless tobacco: Never   Substance and Sexual Activity    Alcohol use: Not Currently     Alcohol/week: 2.0 standard drinks     Types: 2 Shots of liquor per week     Comment: rare    Drug use: No    Sexual activity: Never     Review of Systems   Constitutional: Positive for fever and malaise/fatigue.   HENT:  Negative for congestion.    Eyes:  Negative for blurred vision.   Cardiovascular:  Positive for dyspnea on exertion and leg swelling. Negative for chest pain, claudication, cyanosis, irregular heartbeat, near-syncope, orthopnea, palpitations, paroxysmal nocturnal dyspnea and syncope.   Respiratory:  Negative for shortness of breath.    Endocrine: Negative for polyuria.   Hematologic/Lymphatic: Negative  for bleeding problem.   Skin:  Positive for poor wound healing. Negative for itching and rash.   Musculoskeletal:  Positive for muscle weakness. Negative for joint swelling and muscle cramps.   Gastrointestinal:  Negative for abdominal pain, hematemesis, hematochezia, melena, nausea and vomiting.   Genitourinary:  Negative for dysuria and hematuria.   Neurological:  Positive for weakness. Negative for dizziness, focal weakness, headaches, light-headedness and loss of balance.   Psychiatric/Behavioral:  Negative for depression. The patient is not nervous/anxious.    Objective:     Vital Signs (Most Recent):  Temp: 97.8 °F (36.6 °C) (04/23/23 1540)  Pulse: 72 (04/23/23 1631)  Resp: 20 (04/23/23 1631)  BP: 126/61 (04/23/23 1540)  SpO2: 99 % (04/23/23 1631) Vital Signs (24h Range):  Temp:  [97.5 °F (36.4 °C)-98.3 °F (36.8 °C)] 97.8 °F (36.6 °C)  Pulse:  [62-83] 72  Resp:  [18-20] 20  SpO2:  [97 %-100 %] 99 %  BP: ()/(52-94) 126/61     Weight: 70.8 kg (156 lb)  Body mass index is 25.96 kg/m².    SpO2: 99 %         Intake/Output Summary (Last 24 hours) at 4/23/2023 1924  Last data filed at 4/23/2023 1351  Gross per 24 hour   Intake 170 ml   Output 575 ml   Net -405 ml         Lines/Drains/Airways       Peripheral Intravenous Line  Duration                  Peripheral IV - Single Lumen 04/21/23 1131 24 G Anterior;Distal;Right Forearm 2 days         Peripheral IV - Single Lumen 04/21/23 1515 20 G Left Antecubital 2 days                    Physical Exam  Constitutional:       Appearance: She is well-developed.   HENT:      Head: Normocephalic and atraumatic.   Neck:      Vascular: No JVD.   Cardiovascular:      Rate and Rhythm: Normal rate. Rhythm irregularly irregular.      Pulses:           Carotid pulses are 2+ on the right side and 2+ on the left side.       Radial pulses are 2+ on the right side and 2+ on the left side.        Femoral pulses are 2+ on the right side and 2+ on the left side.     Heart sounds:  Normal heart sounds.   Pulmonary:      Effort: Pulmonary effort is normal.      Breath sounds: Normal breath sounds.   Abdominal:      General: Bowel sounds are normal.      Palpations: Abdomen is soft.   Musculoskeletal:      Cervical back: Neck supple.      Right lower leg: Edema present.      Left lower leg: Edema present.   Skin:     General: Skin is warm and dry.      Findings: Lesion present.   Neurological:      Mental Status: She is alert and oriented to person, place, and time.   Psychiatric:         Behavior: Behavior normal.         Thought Content: Thought content normal.       Significant Labs:   Recent Lab Results  (Last 5 results in the past 24 hours)        04/23/23  1538   04/23/23  1516   04/23/23  1102   04/23/23  0642   04/23/23  0432        Albumin       2.3         Alkaline Phosphatase       81         ALT       9         Anion Gap       10         AST       11         Baso #       0.01         Basophil %       0.3         BILIRUBIN TOTAL       0.2  Comment: For infants and newborns, interpretation of results should be based  on gestational age, weight and in agreement with clinical  observations.    Premature Infant recommended reference ranges:  Up to 24 hours.............<8.0 mg/dL  Up to 48 hours............<12.0 mg/dL  3-5 days..................<15.0 mg/dL  6-29 days.................<15.0 mg/dL           BUN       26         Calcium       7.9         Chloride       104         CO2       23         Creatinine       1.2         Differential Method       Automated         eGFR       43         Eos #       0.0         Eosinophil %       1.1         Glucose       95         Gran # (ANC)       2.6         Gran %       71.0         Hematocrit       24.3         Hemoglobin       7.9         Immature Grans (Abs)       0.04  Comment: Mild elevation in immature granulocytes is non specific and   can be seen in a variety of conditions including stress response,   acute inflammation, trauma and  "pregnancy. Correlation with other   laboratory and clinical findings is essential.           Immature Granulocytes       1.1         Lymph #       0.5         Lymph %       14.9         Magnesium       2.0         MCH       27.9         MCHC       32.5         MCV       86         Mono #       0.4         Mono %       11.6         MPV       10.1         nRBC       0         Phosphorus       3.6         Platelets       162         POCT Glucose 90     97     119       Potassium       4.2         PROTEIN TOTAL       5.6         RBC       2.83         RDW       18.2         Sodium       137         Vancomycin-Trough   10.7             WBC       3.63                              Assessment and Plan:     Active Diagnoses:    Diagnosis Date Noted POA    PRINCIPAL PROBLEM:  Sepsis [A41.9] 04/21/2023 Unknown    Constipation [K59.00] 04/23/2023 Yes    Microcytic anemia [D50.9] 04/22/2023 Yes    Venous insufficiency of both lower extremities [I87.2] 11/22/2019 Yes    Prediabetes [R73.03] 03/31/2017 Yes    Stage 3b chronic kidney disease [N18.32] 05/04/2016 Yes    Chronic diastolic heart failure [I50.32] 05/04/2016 Yes     Chronic    History of TIA (transient ischemic attack) [Z86.73] 05/04/2016 Not Applicable    Chronic atrial fibrillation [I48.20] 01/29/2016 Yes     Chronic    Primary hypertension [I10]  Yes     Chronic    Pure hypercholesterolemia [E78.00] 07/02/2012 Yes     Chronic      Problems Resolved During this Admission:     HFpEF  -appears acutely decompensated - has been drinking a lot of gatorade at home  -Increase lasix to 40 mg IV lasix  -Unclear why BB listed as an allergy - defer to outpt      Afib  -currently rate controlled and BP stable  -Can use CCB (PO dilt) if rate control needed in light of BB "allergy"    Cellulitis  -+fever  -LE edema/wounds  -Aggressive wound care  -management per primary  -RLE venous doppler without DVT    Anemia  -worsening   -anemia w/u  -hold AC for now          VTE Risk " Mitigation (From admission, onward)           Ordered     enoxaparin injection 30 mg  Daily         04/23/23 0636     IP VTE HIGH RISK PATIENT  Once         04/21/23 1507     Place sequential compression device  Until discontinued         04/21/23 1507                    Thank you for your consult. I will follow-up with patient. Please contact us if you have any additional questions.    Joe Casas MD  Cardiology   [place] - [facility] S[facility]

## 2023-04-24 NOTE — HOSPITAL COURSE
Per consult noted by Dr. Casas 4/22/223-4/23/2023 89 y/o female who presented with worsening SOB and LE edema. She has a hx of Afib no AC s/p LAAO device (Watchman), HFpEF, HLD, DM, HTN, TIA. Has been having issues with LE cellulitis and volume status. In ED afib with RVR and given Dilt IV x 1 with improvement. HR ON 's. Fever 101.8. Tele with afib with 6 beat NSVT. Last 2DE with EF 55%. Arterial doppler reviewed.      4/23/2023 Overnight no acute events. No significant output documented. Audibly SOB.  4/24/2023  Remains on IV Lasix daily with 950cc out overnight negative 251cc since admission. HR and BP stable. BMP WNL. CBC with H&H 8.3 & 25.4 this AM. Remains rate controlled in afib with HR 70s  4/25/2023 Remains on IV Lasix with 700cc documented out overnight. BMP WNL. HR and BP stable. Cough and rhonchi noted on exam this AM

## 2023-04-24 NOTE — CONSULTS
[facility] Consult Note    Primary Attending Physician: Dr. Iniguez  Consultant Attending: Dr. Deshpande    Reason for Consult:     Cellulitis    Assessment/Recommendations:     Jenniffer Jimenez is a 89 y.o. female with PMH of CKD, DM2, diabetic polyneuropathy, HTN, PAD, TIA, chronic bilateral lower extremity stasis skin changes/cellulitis. She was admitted due to concern of fever from home health nurse and found to be in Afib with RVR. Has been seen by ID for the BL Lower leg venous ulcer/suspicious of cellulitis. Previously had pseudomonas and ESBL Ecoli infection (cultures in 2/2023). At visit on 3/7 noted that didn't appear acutely infected, decided to encourage elevation of legs and aggressive wound care and potential localized antibiotic powder.   The legs this admission appear similar to photographs in chart from previous clinic visit, without pus or warmth, low concern for an active infection going on in the wound.    - Stop antibiotics  - Continue to monitor for systemic signs of infection  - Aggressive wound care  - Followup with Dr. Deshpande at clinic here upon discharge     Thank you for allowing us to participate in the care of this patient. Pt seen and evaluated with Dr. Deshpande, please contact us if you have any questions regarding this consult. ID will continue to follow.     Rena Person MD  [facility] IM/Peds PGY3/HO2    Subjective:      History of Present Illness:  Jenniffer Jimenez is a 89 y.o. female with a PMH of cataract, [place], CKD, DM II, diabetic polyneuropathy, HTN, PAF, TIA presents to the ED For evaluation of fever noted by home health nurse. RLE appears edematous, erythematous, weeping worse for unknown duration. She also has a rash to her perineal area and back. She notes pain to the RLE. Since in the ED she was noted to be in afib RVR, which is since rate controlled with a dose of IV cardizem.  Per pt and daughter she has been dealing with the BLLE wounds/stasis ulcers for quite  "some time. They are looking better than previous. No foul discharge, warmth.     Past Medical History:  Past Medical History:   Diagnosis Date    Amblyopia of left eye 04/10/2013    Arthritis     Facet arthropathy, Lumbosacral    Atherosclerosis of aorta     Cataract     Central retinal vein occlusion of left eye     CKD (chronic kidney disease) stage 3, GFR 30-59 ml/min     Diabetes mellitus, type 2     Diabetic polyneuropathy 2022    Essential (primary) hypertension     Exotropia of both eyes 2013    recession RSR 5.0mm w/ adj; recession LR os 5.0 w/ adj; resect MR os  4.0mm    Hearing loss     History of resection of small bowel     Hypertensive retinopathy of both eyes     Hypoglycemia     Macular degeneration     OA (osteoarthritis) of shoulder     Right    Osteoporosis     Paroxysmal atrial fibrillation     Posterior vitreous detachment of both eyes     Psychiatric problem     Rhinitis     TIA (transient ischemic attack)        Past Surgical History:  Past Surgical History:   Procedure Laterality Date    APPENDECTOMY      CARDIAC CATHETERIZATION      CATARACT EXTRACTION W/  INTRAOCULAR LENS IMPLANT Bilateral      SECTION, CLASSIC      CLOSURE OF LEFT ATRIAL APPENDAGE USING DEVICE N/A 2020    Procedure: Left atrial appendage closure device;  Surgeon: Abundio Curtis MD;  Location: [facility] CATH LAB;  Service: Cardiology;  Laterality: N/A;    HYSTERECTOMY      INNER EAR SURGERY      JOINT REPLACEMENT      LEFT KNEE REPLACEMENT IN  -    OOPHORECTOMY      SINUS SURGERY      STRABISMUS SURGERY  13    RSR recession 5 mm, LLR recession 5 mm and LMR resection 4mm    STRABISMUS SURGERY  2014    recess LR OD 6mm    TONSILLECTOMY      watchman surgery N/A 2020       Allergies:  Review of patient's allergies indicates:   Allergen Reactions    Opioids - morphine analogues Other (See Comments)     Bowel issues; bowel obstruction    Tizanidine Other (See Comments)     "Lips were numb,  " "Almost passed out."    Tramadol Hallucinations    Beta-blockers (beta-adrenergic blocking agts) Other (See Comments)     Can not go on beta blockers for long period of time - due to taking allergy injections    Morphine     Opioids-meperidine and related     Ciprofloxacin Rash       Medications:   In-Hospital Scheduled Medications:   aspirin  81 mg Oral Daily    diphenhydrAMINE-zinc acetate 2-0.1%   Topical (Top) TID    docusate sodium  100 mg Oral Daily    enoxaparin  30 mg Subcutaneous Daily    ferrous sulfate  1 tablet Oral Daily    fluconazole  200 mg Oral Daily    fluticasone propionate  2 spray Each Nostril Daily    furosemide (LASIX) injection  40 mg Intravenous Daily    guaiFENesin  600 mg Oral BID    lactulose  15 g Oral Daily    miconazole NITRATE 2 %   Topical (Top) BID    piperacillin-tazobactam (ZOSYN) IVPB  4.5 g Intravenous Q8H    pravastatin  40 mg Oral Daily    vancomycin (VANCOCIN) IVPB  1,250 mg Intravenous Q24H      In-Hospital PRN Medications:  acetaminophen, acetaminophen, albuterol-ipratropium, dextrose 10%, dextrose 10%, dextrose, dextrose, glucagon (human recombinant), insulin aspart U-100, naloxone, ondansetron, sodium chloride 0.9%, Pharmacy to dose Vancomycin consult **AND** vancomycin - pharmacy to dose   In-Hospital IV Infusion Medications:     Home Medications:  Prior to Admission medications    Medication Sig Start Date End Date Taking? Authorizing Provider   acetaminophen (TYLENOL) 500 MG tablet Take 1,000 mg by mouth 2 (two) times a day.   Yes Historical Provider   aspirin (ECOTRIN) 81 MG EC tablet Take 1 tablet (81 mg total) by mouth once daily. 10/10/22 10/10/23 Yes Chito Jade MD   diclofenac sodium (VOLTAREN) 1 % Gel Apply 2 g topically 4 (four) times daily. Apply to right hip   Yes Historical Provider   furosemide (LASIX) 20 MG tablet Take 1 tablet (20 mg total) by mouth once daily. Hold for weights 150 lb or less, if weight is 155 or over, take 2 tablets in the morning " 4/14/23 4/14/24 Yes Brent Chapman Jr., MD   nystatin (MYCOSTATIN) powder Apply topically 2 (two) times daily. 4/3/23  Yes Cody Cox MD   pravastatin (PRAVACHOL) 40 MG tablet Take 1 tablet (40 mg total) by mouth once daily. 4/3/23  Yes Brent Chapman Jr., MD   propylene glycol (SYSTANE COMPLETE OPHT) Apply to eye.   Yes Historical Provider   silver sulfADIAZINE 1% (SILVADENE) 1 % cream Apply to RLE skin breakdown twice daily  Patient taking differently: Apply topically 2 (two) times daily. Apply to RLE skin breakdown twice daily 1/16/23  Yes Lorena Moore PA-C   vitamin E 400 UNIT capsule Take 400 Units by mouth once daily.   Yes Historical Provider   QUEtiapine (SEROQUEL) 25 MG Tab Take 1 tablet (25 mg total) by mouth every evening.  Patient not taking: Reported on 4/21/2023 1/29/23 1/29/24  Sarabjit Melara III, MD   sodium zirconium cyclosilicate (LOKELMA) 5 gram packet Take 1 packet (5 g total) by mouth 2 (two) times a day. Mix entire contents of packet(s) into drinking glass containing 3 tablespoons of water; stir well and drink immediately. Add water and repeat until no powder remains to receive entire dose.  Patient not taking: Reported on 4/21/2023 1/24/23 5/24/23  JUSTINO QuirozC       Family History:  Family History   Problem Relation Age of Onset    Hypertension Mother     Hypertension Father     Liver disease Sister     Diabetes Sister     Heart disease Sister     Diabetes Brother     Breast cancer Maternal Aunt     No Known Problems Maternal Uncle     No Known Problems Paternal Aunt     No Known Problems Paternal Uncle     No Known Problems Maternal Grandmother     No Known Problems Maternal Grandfather     No Known Problems Paternal Grandmother     No Known Problems Paternal Grandfather     No Known Problems Daughter     No Known Problems Son     Heart disease Sister     No Known Problems Son     No Known Problems Son     Cancer Neg Hx         in first degree relatives    Melanoma  Neg Hx     Psoriasis Neg Hx     Lupus Neg Hx     Amblyopia Neg Hx     Blindness Neg Hx     Cataracts Neg Hx     Glaucoma Neg Hx     Macular degeneration Neg Hx     Retinal detachment Neg Hx     Strabismus Neg Hx     Stroke Neg Hx     Thyroid disease Neg Hx        Social History:  Social History     Tobacco Use    Smoking status: Former     Types: Cigarettes     Quit date: 10/29/1982     Years since quittin.5     Passive exposure: Never    Smokeless tobacco: Never   Substance Use Topics    Alcohol use: Not Currently     Alcohol/week: 2.0 standard drinks     Types: 2 Shots of liquor per week     Comment: rare    Drug use: No       Review of Systems   Constitutional:  Negative for chills and fever.   Respiratory:  Positive for shortness of breath.    Cardiovascular:  Positive for orthopnea and leg swelling. Negative for chest pain.   Skin:         BLLE skin wounds, right worse than left. No new pus or odors in past week        Objective:   Last 24 Hour Vital Signs:  BP  Min: 100/49  Max: 126/59  Temp  Av.9 °F (36.6 °C)  Min: 97.4 °F (36.3 °C)  Max: 98.4 °F (36.9 °C)  Pulse  Av.9  Min: 62  Max: 76  Resp  Av.9  Min: 16  Max: 20  SpO2  Av %  Min: 94 %  Max: 100 %  I/O last 3 completed shifts:  In: 839.3 [P.O.:245; IV Piggyback:594.3]  Out: 1175 [Urine:1175]    Physical Exam  Vitals reviewed.   Constitutional:       General: She is not in acute distress.     Appearance: She is not toxic-appearing.      Comments: Pleasant, very hard of hearing, appropriate responses when can hear   Cardiovascular:      Rate and Rhythm: Normal rate.   Pulmonary:      Effort: Pulmonary effort is normal.      Breath sounds: No stridor. Rales present. No wheezing.   Musculoskeletal:      Cervical back: Normal range of motion.      Right lower leg: Edema present.      Left lower leg: Edema present.      Comments: Edematous right leg, not warm, no pus or drainage, see images in chart from [facility]   Skin:     General:  Skin is warm and dry.   Neurological:      Mental Status: She is alert and oriented to person, place, and time.       Laboratory Results:  Most Recent Data:  DM:   Lab Results   Component Value Date    HGBA1C 6.0 (H) 01/26/2023    HGBA1C 6.0 (H) 09/20/2022    HGBA1C 5.7 (H) 09/30/2021    GLUF 101 08/14/2007    LDLCALC 105.8 09/20/2022    CREATININE 1.3 04/24/2023     Cardiac:   Lab Results   Component Value Date    TROPONINI <0.006 04/21/2023     (H) 04/21/2023     Trended Lab Data:  Recent Labs   Lab 04/22/23  0349 04/23/23  0642 04/24/23  0529   WBC 3.80* 3.63* 3.35*   HGB 7.3* 7.9* 8.3*   HCT 22.6* 24.3* 25.4*    162 185   MCV 86 86 84   RDW 17.9* 18.2* 18.0*   * 137 139   K 4.1 4.2 3.7    104 103   CO2 21* 23 26   BUN 27 26 28    95 95   PROT 5.3* 5.6* 5.7*   ALBUMIN 2.2* 2.3* 2.3*   BILITOT 0.3 0.2 0.2   AST 11 11 12   ALKPHOS 86 81 73   ALT 8* 9* 9*       Microbiology Data:  4/21 BlCx: NGTD  4/21 BlCx: NGTD  2/28: R leg wound culture: pseudomonas aeruginosa, ESBL EColi    Antimicrobials:  Vancomycin 4/21- now  Cefepime 4/21 - now    Other Results:    Radiology:  X-Ray Chest 1 View    Result Date: 4/23/2023  EXAMINATION: XR CHEST 1 VIEW CLINICAL HISTORY: sob; TECHNIQUE: Single frontal view of the chest was performed. COMPARISON: 04/07/2023 FINDINGS: The heart is enlarged.  Calcified atheromatous disease affects the aorta.  There is pulmonary vascular congestion with mild edema.  A left-sided pleural effusion is present.  Findings appear slightly worsened as compared to the previous study of 04/21/2023     As above. Electronically signed by: Terri Andujar MD Date:    04/23/2023 Time:    09:47    X-Ray Tibia Fibula 2 View Right    Result Date: 4/22/2023  EXAMINATION: XR TIBIA FIBULA 2 VIEW RIGHT CLINICAL HISTORY: ro osteo; TECHNIQUE: Two views of the right tibia and fibula COMPARISON: 01/13/2023 FINDINGS: The alignment is within normal limits.  No displaced fractures identified.   No evidence of lytic or blastic lesions.Joint spaces are unremarkable.Mild soft tissue swelling about the right lower extremity.  Vascular calcifications are seen.  No osseous erosions.     As above.  If there is high clinical concern for osteomyelitis, consider further evaluation with MRI. Electronically signed by: Terri Andujar MD Date:    04/22/2023 Time:    13:38    X-Ray Chest AP Portable    Result Date: 4/21/2023  EXAMINATION: XR CHEST AP PORTABLE CLINICAL HISTORY: Sepsis; TECHNIQUE: Single frontal view of the chest was performed. COMPARISON: 04/07/2023. FINDINGS: There are changes of prior intramedullary fixation of the left humerus.  There are degenerative changes in the osseous structures. There are calcifications of the trachea.  There are calcifications of the aortic knob.  There are postoperative changes in the left paramediastinal region.  The cardiomediastinal silhouette is enlarged.  There is no evidence of free air beneath the hemidiaphragms.  There is no pleural effusion on the right.  There is stable small left-sided pleural effusion.  There is no evidence of a pneumothorax.  There is no evidence of pneumomediastinum.  There is pulmonary interstitial edema.  There is no focal consolidation.  There are degenerative changes in the osseous structures.     Cardiomegaly with pulmonary vascular congestion and trace left-sided pleural effusions, suggestive of fluid overload state. Electronically signed by: Austin Us MD Date:    04/21/2023 Time:    12:17    X-Ray Chest AP Portable    Result Date: 4/7/2023  EXAMINATION: XR CHEST AP PORTABLE CLINICAL HISTORY: weakness; TECHNIQUE: Single frontal view of the chest was performed. COMPARISON: 01/25/2023. FINDINGS: The lungs are well expanded. Coarse chronic interstitial prominence noted. No new focal consolidation. There is nonspecific elevation of the left hemidiaphragm. The pleural spaces are clear. The cardiac silhouette is enlarged. There are  calcifications of the aortic arch. The visualized osseous structures demonstrate degenerative changes. There is hardware in the left proximal humerus.     No acute cardiopulmonary abnormality. Electronically signed by: Alcon Blanc Date:    04/07/2023 Time:    21:19    US Lower Extremity Arteries Right    Result Date: 4/21/2023  EXAMINATION: US LOWER EXTREMITY ARTERIES RIGHT CLINICAL HISTORY: nonhealing wounds; TECHNIQUE: Ultrasound arterial lower extremity, right. COMPARISON: Ultrasound from 11/09/2022. FINDINGS: Right lower extremity. CFA: 170 cm/sec, triphasic. DFA: 126 cm/sec, triphasic. Prox SFA: 173 cm/sec, triphasic. Mid SFA: 180 cm/sec, triphasic. Dist SFA: 141 cm/sec, biphasic. Prox pop A: 114 cm/sec, monophasic. Distal pop A: 169 cm/sec, monophasic. COTY: 92 cm/sec, monophasic. PTA: 93 cm/sec, monophasic.     1. No evidence of high-grade stenosis of the right lower extremity arteries. 2. Monophasic waveforms in the popliteal artery and calf arteries, likely related to vessel hardening from atherosclerotic disease. Electronically signed by: Alcon Blanc Date:    04/21/2023 Time:    18:28    US Lower Extremity Veins Right    Result Date: 4/22/2023  EXAMINATION: US LOWER EXTREMITY VEINS RIGHT CLINICAL HISTORY: ro DVT; TECHNIQUE: Duplex and color flow Doppler evaluation and graded compression of the right lower extremity veins was performed. COMPARISON: Bilateral lower extremity venous upper ultrasound 11/08/2022 FINDINGS: Right thigh veins: The common femoral, femoral, popliteal, upper greater saphenous, and deep femoral veins are patent and free of thrombus. The veins are normally compressible and have normal phasic flow and augmentation response. Right calf veins: The visualized calf veins are patent. Contralateral CFV: The contralateral (left) common femoral vein is patent and free of thrombus. Miscellaneous: Few prominent but otherwise nonenlarged normal morphologic appearing lymph nodes at the right  inguinal region.  Nonspecific subcutaneous edema.     No evidence of deep venous thrombosis in the right lower extremity. Right lower extremity nonspecific subcutaneous edema, with right groin few prominent but nonenlarged lymph nodes. Electronically signed by: Himanshu Perez MD Date:    04/22/2023 Time:    13:04    Echo    Result Date: 4/23/2023  · The left ventricle is normal in size with concentric hypertrophy and normal systolic function. · The estimated ejection fraction is 55%. · Indeterminate left ventricular diastolic function. · With normal right ventricular systolic function. · Severe left atrial enlargement. · Severe right atrial enlargement. · Mild mitral regurgitation. · Mild to moderate tricuspid regurgitation. · There is pulmonary hypertension. · The estimated PA systolic pressure is 56 mmHg. · Elevated central venous pressure (15 mmHg).

## 2023-04-24 NOTE — PT/OT/SLP EVAL
Physical Therapy Evaluation and treatment    Patient Name:  Jenniffer Jimenez   MRN:  [id number]    Recommendations:     Discharge Recommendations: nursing facility, skilled   Discharge Equipment Recommendations:  (defer to post acute)   Barriers to discharge: Decreased caregiver support    Assessment:     Jenniffer Jimenez is a 89 y.o. female admitted with a medical diagnosis of Sepsis.  She presents with the following impairments/functional limitations: impaired cardiopulmonary response to activity, weakness, impaired endurance, impaired sensation, impaired self care skills, impaired functional mobility, gait instability, impaired balance, decreased safety awareness, edema, impaired skin, decreased ROM, decreased upper extremity function, decreased lower extremity function.  Desat with minimal activity but recovery time is decreasing.  Needs post acute to return to I PLOF    Rehab Prognosis: Good; patient would benefit from acute skilled PT services to address these deficits and reach maximum level of function.    Recent Surgery: * No surgery found *      Plan:     During this hospitalization, patient to be seen 5 x/week to address the identified rehab impairments via gait training, therapeutic activities, therapeutic exercises and progress toward the following goals:    Plan of Care Expires:  05/24/23    Subjective     Chief Complaint: need to go to bathroom  Patient/Family Comments/goals: PLOF  Pain/Comfort:  Pain Rating 1: 0/10    Patients cultural, spiritual, Yazidi conflicts given the current situation:  (none stated)    Living Environment:  Pt lives alone in 5th floor apt in [facility] senior living-with elevator.  Handicap toilet, WIS with built in bench and GB   Prior to admission, patients level of function was mod I for ADl and amb with rollator.  Pt states she rode stationary bike for 1 hr 5 x week.  .  Equipment used at home: bedside commode, walker, rolling, rollator, cane, straight.   DME owned (not currently used): none.  Upon discharge, patient will have limited assistance from daughter.    Objective:     Communicated with nursing prior to session.  Patient found HOB elevated with telemetry, oxygen, peripheral IV, pulse ox (continuous), bed alarm  upon PT entry to room.    General Precautions: Standard, fall, hearing impaired  Orthopedic Precautions:N/A   Braces: N/A  Respiratory Status: Nasal cannula, flow 2 L/min    Exams:  Cognitive Exam:  Patient is oriented to Person, Place, Time, and Situation  Gross Motor Coordination:  WFL  Postural Exam:  Patient presented with the following abnormalities:    -       Rounded shoulders  Sensation:    -       Impaired  feet up to ankles  Skin Integrity/Edema:      -       Skin integrity:  B lower legs and feet bandaged.  Rash-redness and flaking skin to multiple areas-back, legs, perineum  RLE ROM: WFL  RLE Strength: grossly 4-/5  LLE ROM: WFL  LLE Strength: grossly 4-/5    Functional Mobility:  Bed Mobility:     Rolling Left:  contact guard assistance  Rolling Right: contact guard assistance  Supine to Sit: minimum assistance  Sit to Supine: left up in chair  Transfers:     Sit to Stand:  minimum assistance with hand-held assist and rolling walker  Toilet Transfer: minimum assistance with  hand-held assist  using  Stand Pivot  Gait: pt amb 10' with RW and CGA around bottom of bed  from BSC to bs chair.  No LOB.  Balance: standing F+      AM-PAC 6 CLICK MOBILITY  Total Score:17       Treatment & Education:  Pt evaluated. Instruct in role of PT and POC, importance of OOB.   Assist to R side of bed.  O2 sats in low 90's.  Desat down to 83-85% with activity but recovers quickly with purse lipped breathing(PLB)  Pt needed to urinate so placed back in bed and assisted OOB on opposite side of bed.  Stand pivot t/f to BSC.  Assist with toilet hygiene.  Amb with RW as above to bs chair.  Seated rest and PLB between each activity to recover.  Set up with lunch  and discussion with daughter who arrived during session .  Questions answered    Patient left up in chair with all lines intact, call button in reach, chair alarm on, and nurse notified.    GOALS:   Multidisciplinary Problems       Physical Therapy Goals          Problem: Physical Therapy    Goal Priority Disciplines Outcome Goal Variances Interventions   Physical Therapy Goal     PT, PT/OT Ongoing, Progressing     Description: Goals to be met by: 23     Patient will increase functional independence with mobility by performin. Supine to sit with Modified Dawson  2. Sit to supine with Modified Dawson  3. Sit to stand transfer with Modified Dawson  4. Bed to chair transfer with Supervision using Rolling Walker  5. Gait  x >100' feet with Supervision using Rolling Walker.                          History:     Past Medical History:   Diagnosis Date    Amblyopia of left eye 04/10/2013    Arthritis     Facet arthropathy, Lumbosacral    Atherosclerosis of aorta     Cataract     Central retinal vein occlusion of left eye     CKD (chronic kidney disease) stage 3, GFR 30-59 ml/min     Diabetes mellitus, type 2     Diabetic polyneuropathy 2022    Essential (primary) hypertension     Exotropia of both eyes 2013    recession RSR 5.0mm w/ adj; recession LR os 5.0 w/ adj; resect MR os  4.0mm    Hearing loss     History of resection of small bowel     Hypertensive retinopathy of both eyes     Hypoglycemia     Macular degeneration     OA (osteoarthritis) of shoulder     Right    Osteoporosis     Paroxysmal atrial fibrillation     Posterior vitreous detachment of both eyes     Psychiatric problem     Rhinitis     TIA (transient ischemic attack)        Past Surgical History:   Procedure Laterality Date    APPENDECTOMY      CARDIAC CATHETERIZATION      CATARACT EXTRACTION W/  INTRAOCULAR LENS IMPLANT Bilateral      SECTION, CLASSIC      CLOSURE OF LEFT ATRIAL APPENDAGE USING DEVICE N/A  11/4/2020    Procedure: Left atrial appendage closure device;  Surgeon: Abundio Curtis MD;  Location: [facility] CATH LAB;  Service: Cardiology;  Laterality: N/A;    HYSTERECTOMY      INNER EAR SURGERY      JOINT REPLACEMENT      LEFT KNEE REPLACEMENT IN 2013 -    OOPHORECTOMY      SINUS SURGERY      STRABISMUS SURGERY  2/6/13    RSR recession 5 mm, LLR recession 5 mm and LMR resection 4mm    STRABISMUS SURGERY  03/19/2014    recess LR OD 6mm    TONSILLECTOMY      watchman surgery N/A 11/2020       Time Tracking:     PT Received On: 04/24/23  PT Start Time: 1154     PT Stop Time: 1252  PT Total Time (min): 58 min     Billable Minutes: Evaluation 15, Gait Training 10, and Therapeutic Activity 33      04/24/2023

## 2023-04-24 NOTE — ASSESSMENT & PLAN NOTE
This patient does have evidence of infective focus  My overall impression is sepsis.  Source: Skin and Soft Tissue (location RLE)  Antibiotics given-   Antibiotics (72h ago, onward)    Start     Stop Route Frequency Ordered    04/24/23 1600  vancomycin 1,250 mg in dextrose 5 % (D5W) 250 mL IVPB (Vial-Mate)         -- IV Every 24 hours (non-standard times) 04/23/23 1722    04/21/23 2330  piperacillin-tazobactam (ZOSYN) 4.5 g in dextrose 5 % in water (D5W) 5 % 100 mL IVPB (MB+)         -- IV Every 8 hours (non-standard times) 04/21/23 1505    04/21/23 1600  vancomycin - pharmacy to dose  (vancomycin IVPB)        See Vitalypace for full Linked Orders Report.    -- IV pharmacy to manage frequency 04/21/23 1505        Latest lactate reviewed-  Recent Labs   Lab 04/21/23  1457   LACTATE 2.2     Organ dysfunction indicated by no organ dysfunction     Fluid challenge Actual Body weight- Patient will receive 30ml/kg actual body weight to calculate fluid bolus for treatment of septic shock.     Post- resuscitation assessment Yes Perfusion exam was performed within 6 hours of septic shock presentation after bolus shows Adequate tissue perfusion assessed by non-invasive monitoring       Will Not start Pressors- Levophed for MAP of 65  Source control achieved by: IV vancomycin and zosyn     Wound care   PRN tylenol for pain or fever. Patient has multiple allergies  Diflucan and topical tx for yeast   Blood cx pending NGTD    ESR, CRP - elevated, xray RLE to r/o osteo with chronic wound. No evidence of osteo. Will MRI given ESR, CRP elevated       -given multiple positive wound cx in the past to lower extremities will consult ID to guide abx        PT/OT for mobility- recommending SNF

## 2023-04-24 NOTE — NURSING
Wounds to BLE changed at bedside by wound care nurse, Anila and bedside nurse, Funmi. Waffle mattress applied to patients bed.

## 2023-04-24 NOTE — SUBJECTIVE & OBJECTIVE
Review of Systems   Constitutional: Negative for chills, decreased appetite, diaphoresis and fever.   Cardiovascular:  Negative for chest pain, claudication, cyanosis, dyspnea on exertion, irregular heartbeat, leg swelling, near-syncope, orthopnea, palpitations, paroxysmal nocturnal dyspnea and syncope.   Respiratory:  Negative for cough, hemoptysis, shortness of breath and wheezing.    Skin:  Positive for poor wound healing.   Gastrointestinal:  Negative for bloating, abdominal pain, constipation, diarrhea, melena, nausea and vomiting.   Neurological:  Negative for dizziness and weakness.   Objective:     Vital Signs (Most Recent):  Temp: 97.4 °F (36.3 °C) (04/24/23 1144)  Pulse: 71 (04/24/23 1205)  Resp: 20 (04/24/23 1144)  BP: (!) 115/55 (04/24/23 1144)  SpO2: (!) 94 % (04/24/23 1144)   Vital Signs (24h Range):  Temp:  [97.4 °F (36.3 °C)-98.4 °F (36.9 °C)] 97.4 °F (36.3 °C)  Pulse:  [62-76] 71  Resp:  [16-20] 20  SpO2:  [94 %-100 %] 94 %  BP: (100-126)/(49-61) 115/55     Weight: 70.8 kg (156 lb)  Body mass index is 25.96 kg/m².     SpO2: (!) 94 %         Intake/Output Summary (Last 24 hours) at 4/24/2023 1527  Last data filed at 4/24/2023 1200  Gross per 24 hour   Intake 711.27 ml   Output 1000 ml   Net -288.73 ml       Lines/Drains/Airways       Peripheral Intravenous Line  Duration                  Peripheral IV - Single Lumen 04/21/23 1131 24 G Anterior;Distal;Right Forearm 3 days         Peripheral IV - Single Lumen 04/21/23 1515 20 G Left Antecubital 3 days                    Physical Exam  Constitutional:       General: She is not in acute distress.     Appearance: She is well-developed.   Cardiovascular:      Rate and Rhythm: Normal rate and regular rhythm.      Heart sounds: No murmur heard.    No gallop.   Pulmonary:      Effort: Pulmonary effort is normal. No respiratory distress.      Breath sounds: Normal breath sounds. No wheezing.   Abdominal:      General: Bowel sounds are normal. There is no  distension.      Palpations: Abdomen is soft.      Tenderness: There is no abdominal tenderness.   Skin:     General: Skin is warm and dry.   Neurological:      Mental Status: She is alert and oriented to person, place, and time.       Significant Labs: BMP:   Recent Labs   Lab 04/23/23  0642 04/24/23  0529   GLU 95 95    139   K 4.2 3.7    103   CO2 23 26   BUN 26 28   CREATININE 1.2 1.3   CALCIUM 7.9* 8.2*   MG 2.0 1.9    and CBC   Recent Labs   Lab 04/23/23  0642 04/24/23  0529   WBC 3.63* 3.35*   HGB 7.9* 8.3*   HCT 24.3* 25.4*    185       Significant Imaging: Echocardiogram: Transthoracic echo (TTE) complete (Cupid Only):   Results for orders placed or performed during the hospital encounter of 04/21/23   Echo   Result Value Ref Range    BSA 1.8 m2    TDI SEPTAL 0.14 m/s    LA WIDTH 4.20 cm    IVC diameter 2.21 cm    Left Ventricular Outflow Tract Mean Velocity 0.69 cm/s    Left Ventricular Outflow Tract Mean Gradient 2.10 mmHg    TDI LATERAL 0.16 m/s    LVIDd 4.59 3.5 - 6.0 cm    IVS 1.19 (A) 0.6 - 1.1 cm    Posterior Wall 1.21 (A) 0.6 - 1.1 cm    Ao root annulus 3.45 cm    LVIDs 3.19 2.1 - 4.0 cm    FS 31 28 - 44 %    LA volume 101.04 cm3    LV mass 204.30 g    LA size 4.72 cm    RVDD 3.14 cm    RV S' 0.01 cm/s    Left Ventricle Relative Wall Thickness 0.53 cm    AV mean gradient 4 mmHg    AV valve area 2.18 cm2    AV Velocity Ratio 0.66     AV index (prosthetic) 0.77     Mean e' 0.15 m/s    IVRT 65.65 msec    Pulm vein S/D ratio 0.49     LVOT diameter 1.90 cm    LVOT area 2.8 cm2    LVOT peak rg 0.96 m/s    LVOT peak VTI 21.20 cm    Ao peak rg 1.46 m/s    Ao VTI 27.5 cm    Mr max rg 3.07 m/s    LVOT stroke volume 60.08 cm3    AV peak gradient 9 mmHg    TR Max Rg 3.22 m/s    PV Peak S Rg 0.29 m/s    PV Peak D Rg 0.59 m/s    LV Systolic Volume 40.59 mL    LV Systolic Volume Index 22.8 mL/m2    LV Diastolic Volume 96.67 mL    LV Diastolic Volume Index 54.31 mL/m2    LA Volume Index  56.8 mL/m2    LV Mass Index 115 g/m2    RA Major Axis 6.31 cm    Left Atrium Minor Axis 5.85 cm    Left Atrium Major Axis 6.15 cm    Triscuspid Valve Regurgitation Peak Gradient 41 mmHg    LA Volume Index (Mod) 48.7 mL/m2    LA volume (mod) 86.68 cm3    RA Width 4.50 cm    Gill's Biplane MOD Ejection Fraction 5 %    Right Atrial Pressure (from IVC) 15 mmHg    EF 55 %    TV rest pulmonary artery pressure 56 mmHg    Narrative    · The left ventricle is normal in size with concentric hypertrophy and   normal systolic function.  · The estimated ejection fraction is 55%.  · Indeterminate left ventricular diastolic function.  · With normal right ventricular systolic function.  · Severe left atrial enlargement.  · Severe right atrial enlargement.  · Mild mitral regurgitation.  · Mild to moderate tricuspid regurgitation.  · There is pulmonary hypertension.  · The estimated PA systolic pressure is 56 mmHg.  · Elevated central venous pressure (15 mmHg).

## 2023-04-24 NOTE — ASSESSMENT & PLAN NOTE
- echo with normal LVEF; reports drinking fluids (Gatorade) as an outpatient likely the cause of ADHF  - diuresis in process with IV lasix 40mg daily with 950cc out overnight negative 251cc since admission ?accuracy  - reports SOB improving; appears improved from yesterday  - goal BP less than 130/80and well controlled  - anticipate transition to oral Lasix soon; recommend slight up titration to 40mg po daily with close monitoring

## 2023-04-24 NOTE — PLAN OF CARE
SW met with pt at bedside to complete assessment. Pt is AxO X3  and able to verbally answer assessment questions. [place] get support from daughter Afshan with transportation etc. Pt is supported by daughter Paul for POA support. Pt reports able to ambulate at home with rolling walker. Pt reports able to bathe and clothe self. SW updated whiteboard with SW name and contact information. SW confirmed pt understanding of Observation unit and expected discharge plan. SW will continue to follow pt throughout care and assist with any discharge needs.         04/24/23 1610   Discharge Planning   Assessment Type Discharge Planning Brief Assessment   Resource/Environmental Concerns none   Support Systems Children;Family members   Equipment Currently Used at Home walker, rolling;rollator   Current Living Arrangements home   Patient/Family Anticipates Transition to inpatient rehabilitation facility   Patient/Family Anticipated Services at Transition none   DME Needed Upon Discharge  none   Discharge Plan A Independent living facility   Discharge Plan B Skilled Nursing Facility;Rehab       Future Appointments   Date Time Provider Department Center   4/25/2023 11:00 AM Rani Fagan MD [facility] [place]   5/4/2023  3:00 PM KIMBERLY Lopez [facility]   6/15/2023 11:15 AM Lennie Louis DPM [facility] POD [facility]   6/29/2023  8:30 AM Rachael Case MD MET IM [place]   7/12/2023 11:30 AM Brent Chapman Jr., MD [facility] CARDIO [place]     KAYLA Garcia Case Management  [phone]

## 2023-04-24 NOTE — SUBJECTIVE & OBJECTIVE
Interval hx: this am patient is more calm and cooperative.She has some SOB with a dry cough. She has pain to RLE. She denies bleeding aside from minimal bleed to RLE wound. No BM     Objective:     Vital Signs (Most Recent):  Temp: 97.4 °F (36.3 °C) (04/24/23 1144)  Pulse: 75 (04/24/23 1144)  Resp: 20 (04/24/23 1144)  BP: (!) 115/55 (04/24/23 1144)  SpO2: (!) 94 % (04/24/23 1144)   Vital Signs (24h Range):  Temp:  [97.4 °F (36.3 °C)-98.4 °F (36.9 °C)] 97.4 °F (36.3 °C)  Pulse:  [62-76] 75  Resp:  [16-20] 20  SpO2:  [94 %-100 %] 94 %  BP: (100-126)/(49-61) 115/55     Weight: 70.8 kg (156 lb)  Body mass index is 25.96 kg/m².    Physical Exam  Constitutional:       Appearance: She is ill-appearing.   HENT:      Head: Normocephalic and atraumatic.      Mouth/Throat:      Pharynx: Oropharynx is clear.   Eyes:      Conjunctiva/sclera: Conjunctivae normal.   Cardiovascular:      Rate and Rhythm: Tachycardia present. Rhythm irregular.      Pulses: Normal pulses.      Heart sounds: Normal heart sounds.   Pulmonary:      Effort: Pulmonary effort is normal.      Comments: Coarse, but improving   Abdominal:      General: Abdomen is flat. Bowel sounds are normal.      Palpations: Abdomen is soft.   Musculoskeletal:         General: Swelling and tenderness present.      Cervical back: Normal range of motion and neck supple.   Skin:     General: Skin is warm and dry.      Comments: RLE edematous, erythematous, weeping, tender, warm  Palpable pulse     LLE with skin tear and rash, does not appear infected     Noted yeast type rash to perineal area while she is sitting on BS commode     Erythematous rash to back    Neurological:      General: No focal deficit present.      Mental Status: She is alert.   Psychiatric:         Mood and Affect: Mood is not anxious.         Behavior: Behavior is not agitated.           Significant Labs: All pertinent labs within the past 24 hours have been reviewed.    Significant Imaging: I have  reviewed all pertinent imaging results/findings within the past 24 hours.  Review of Systems

## 2023-04-24 NOTE — ASSESSMENT & PLAN NOTE
Patient is identified as having Diastolic (HFpEF) heart failure that is acute on Chronic. CHF is currently uncontrolled. Latest ECHO performed and demonstrates- Results for orders placed during the hospital encounter of 10/07/22    Echo     The left ventricle is normal in size with concentric hypertrophy and normal systolic function.   The estimated ejection fraction is 55%.   Indeterminate left ventricular diastolic function.   With normal right ventricular systolic function.   Severe left atrial enlargement.   Severe right atrial enlargement.   Mild mitral regurgitation.   Mild to moderate tricuspid regurgitation.   There is pulmonary hypertension.   The estimated PA systolic pressure is 56 mmHg.   Elevated central venous pressure (15 mmHg  . Continue Furosemide and monitor clinical status closely. Monitor on telemetry. Patient is off CHF pathway.  Monitor strict Is&Os and daily weights.  Place on fluid restriction of 1.5 L. Continue to stress to patient importance of self efficacy and  on diet for CHF. Last BNP reviewed- and noted below   Recent Labs   Lab 04/21/23  1132   *   .    S/p sepsis IVF. Appears volume overloaded - lasix 20 mg IV x1  4/22 then may continue lasix at home dose PO     Strict I&O    Low na diet   Following cardiology recs       Continue lasix at 40 mg IV daily - patient needs increased diuresis   Incentive spirometry   CXR: The heart is enlarged.  Calcified atheromatous disease affects the aorta.  There is pulmonary vascular congestion with mild edema.  A left-sided pleural effusion is present.  Findings appear slightly worsened as compared to the previous study of 04/21/2023 4/24  Lung sounds improving, however still with some crackles, cough and SOB, improving from yesterday, but not at baseline.   Continue diuresis

## 2023-04-24 NOTE — HOSPITAL COURSE
"Ms. Jimenez was admitted for AFib RVR and fever, presumed cellulitis to right lower extremity.  She was treated with IV Cardizem for RVR.  Beta-blockers are listed as allergies on her allergy profile.  She was treated with IV vancomycin and Zosyn.    She was also treated with IV Lasix for acute exacerbation of chronic diastolic heart failure. Now appears euvolemic. Have increased home lasix dose from 20mg->40mg. Did repeat CXR which is concerning for development of LLL opacification, c/f CAP. On oral antibiotics. Now weaned off supplemental O2. Per ID 4/24 low concern for an active infection going on in the wound. Will continue aggressive wound care. Arrange for follow up with [facility], Cardiology, and Wound Care clinic. Discharge to SNF.     BP (!) 124/58   Pulse 78   Temp 98.3 °F (36.8 °C)   Resp 17   Ht 5' 5" (1.651 m)   Wt 70.8 kg (156 lb)   LMP  (LMP Unknown)   SpO2 97%   BMI 25.96 kg/m²   Physical Exam  Constitutional:       Appearance: She is ill-appearing.   HENT:      Head: Normocephalic and atraumatic.      Mouth/Throat:      Pharynx: Oropharynx is clear.   Eyes:      Conjunctiva/sclera: Conjunctivae normal.   Cardiovascular:      Rate and Rhythm: Normal rate. Rhythm irregular.      Pulses: Normal pulses.      Heart sounds: Normal heart sounds.   Pulmonary:      Effort: Pulmonary effort is normal.      Comments: Coarse, but improving   Abdominal:      General: Abdomen is flat. Bowel sounds are normal.      Palpations: Abdomen is soft.   Musculoskeletal:         General: Swelling and tenderness present.      Cervical back: Normal range of motion and neck supple.   Skin:     General: Skin is warm and dry.      Findings: Rash present.      Comments: RLE edematous, erythematous, weeping, tender, warm  Palpable pulse      LLE with skin tear and rash, does not appear infected      Noted yeast type rash to perineal area     Erythematous rash to back    Neurological:      General: No focal deficit " present.      Mental Status: She is alert.   Psychiatric:         Mood and Affect: Mood is not anxious.         Behavior: Behavior is not agitated.

## 2023-04-25 PROBLEM — A41.9 SEPSIS: Status: RESOLVED | Noted: 2023-04-21 | Resolved: 2023-04-25

## 2023-04-25 LAB
ANION GAP SERPL CALC-SCNC: 11 MMOL/L (ref 8–16)
BUN SERPL-MCNC: 22 MG/DL (ref 10–30)
CALCIUM SERPL-MCNC: 8.5 MG/DL (ref 8.7–10.5)
CHLORIDE SERPL-SCNC: 104 MMOL/L (ref 95–110)
CO2 SERPL-SCNC: 24 MMOL/L (ref 23–29)
CREAT SERPL-MCNC: 1 MG/DL (ref 0.5–1.4)
EST. GFR  (NO RACE VARIABLE): 53 ML/MIN/1.73 M^2
GLUCOSE SERPL-MCNC: 89 MG/DL (ref 70–110)
POCT GLUCOSE: 123 MG/DL (ref 70–110)
POCT GLUCOSE: 124 MG/DL (ref 70–110)
POCT GLUCOSE: 205 MG/DL (ref 70–110)
POCT GLUCOSE: 98 MG/DL (ref 70–110)
POTASSIUM SERPL-SCNC: 4 MMOL/L (ref 3.5–5.1)
SODIUM SERPL-SCNC: 139 MMOL/L (ref 136–145)

## 2023-04-25 PROCEDURE — 96376 TX/PRO/DX INJ SAME DRUG ADON: CPT

## 2023-04-25 PROCEDURE — 25000003 PHARM REV CODE 250: Mod: HCNC | Performed by: NURSE PRACTITIONER

## 2023-04-25 PROCEDURE — 63600175 PHARM REV CODE 636 W HCPCS: Mod: HCNC | Performed by: NURSE PRACTITIONER

## 2023-04-25 PROCEDURE — 25000242 PHARM REV CODE 250 ALT 637 W/ HCPCS: Mod: HCNC | Performed by: NURSE PRACTITIONER

## 2023-04-25 PROCEDURE — 97530 THERAPEUTIC ACTIVITIES: CPT | Mod: HCNC

## 2023-04-25 PROCEDURE — 25000003 PHARM REV CODE 250: Mod: HCNC | Performed by: HOSPITALIST

## 2023-04-25 PROCEDURE — 99233 SBSQ HOSP IP/OBS HIGH 50: CPT | Mod: HCNC,,, | Performed by: NURSE PRACTITIONER

## 2023-04-25 PROCEDURE — 27000221 HC OXYGEN, UP TO 24 HOURS: Mod: HCNC

## 2023-04-25 PROCEDURE — 21400001 HC TELEMETRY ROOM: Mod: HCNC

## 2023-04-25 PROCEDURE — 99900035 HC TECH TIME PER 15 MIN (STAT): Mod: HCNC

## 2023-04-25 PROCEDURE — 94761 N-INVAS EAR/PLS OXIMETRY MLT: CPT | Mod: HCNC

## 2023-04-25 PROCEDURE — 97110 THERAPEUTIC EXERCISES: CPT | Mod: HCNC

## 2023-04-25 PROCEDURE — 94799 UNLISTED PULMONARY SVC/PX: CPT | Mod: HCNC

## 2023-04-25 PROCEDURE — 97116 GAIT TRAINING THERAPY: CPT | Mod: HCNC

## 2023-04-25 PROCEDURE — 97535 SELF CARE MNGMENT TRAINING: CPT | Mod: HCNC,CO

## 2023-04-25 PROCEDURE — 97530 THERAPEUTIC ACTIVITIES: CPT | Mod: HCNC,CO

## 2023-04-25 PROCEDURE — 99233 PR SUBSEQUENT HOSPITAL CARE,LEVL III: ICD-10-PCS | Mod: HCNC,,, | Performed by: NURSE PRACTITIONER

## 2023-04-25 PROCEDURE — 63600175 PHARM REV CODE 636 W HCPCS: Mod: HCNC | Performed by: HOSPITALIST

## 2023-04-25 PROCEDURE — 94640 AIRWAY INHALATION TREATMENT: CPT | Mod: HCNC

## 2023-04-25 PROCEDURE — 36415 COLL VENOUS BLD VENIPUNCTURE: CPT | Mod: HCNC | Performed by: HOSPITALIST

## 2023-04-25 PROCEDURE — 80048 BASIC METABOLIC PNL TOTAL CA: CPT | Mod: HCNC | Performed by: HOSPITALIST

## 2023-04-25 RX ORDER — GUAIFENESIN 100 MG/5ML
200 SOLUTION ORAL EVERY 4 HOURS PRN
Status: DISCONTINUED | OUTPATIENT
Start: 2023-04-25 | End: 2023-04-27 | Stop reason: HOSPADM

## 2023-04-25 RX ORDER — ENOXAPARIN SODIUM 100 MG/ML
40 INJECTION SUBCUTANEOUS EVERY 24 HOURS
Status: DISCONTINUED | OUTPATIENT
Start: 2023-04-25 | End: 2023-04-27 | Stop reason: HOSPADM

## 2023-04-25 RX ORDER — GUAIFENESIN/DEXTROMETHORPHAN 100-10MG/5
5 SYRUP ORAL EVERY 4 HOURS PRN
Status: DISCONTINUED | OUTPATIENT
Start: 2023-04-25 | End: 2023-04-27 | Stop reason: HOSPADM

## 2023-04-25 RX ORDER — MUPIROCIN 20 MG/G
OINTMENT TOPICAL 2 TIMES DAILY
Status: DISCONTINUED | OUTPATIENT
Start: 2023-04-25 | End: 2023-04-27 | Stop reason: HOSPADM

## 2023-04-25 RX ADMIN — GUAIFENESIN 600 MG: 600 TABLET, EXTENDED RELEASE ORAL at 08:04

## 2023-04-25 RX ADMIN — FUROSEMIDE 40 MG: 10 INJECTION, SOLUTION INTRAMUSCULAR; INTRAVENOUS at 08:04

## 2023-04-25 RX ADMIN — FERROUS SULFATE TAB 325 MG (65 MG ELEMENTAL FE) 1 EACH: 325 (65 FE) TAB at 08:04

## 2023-04-25 RX ADMIN — PRAVASTATIN SODIUM 40 MG: 40 TABLET ORAL at 08:04

## 2023-04-25 RX ADMIN — ENOXAPARIN SODIUM 40 MG: 40 INJECTION SUBCUTANEOUS at 05:04

## 2023-04-25 RX ADMIN — FLUCONAZOLE 200 MG: 200 TABLET ORAL at 08:04

## 2023-04-25 RX ADMIN — GUAIFENESIN 600 MG: 600 TABLET, EXTENDED RELEASE ORAL at 09:04

## 2023-04-25 RX ADMIN — IPRATROPIUM BROMIDE AND ALBUTEROL SULFATE 3 ML: .5; 3 SOLUTION RESPIRATORY (INHALATION) at 08:04

## 2023-04-25 RX ADMIN — MICONAZOLE NITRATE: 20 OINTMENT TOPICAL at 09:04

## 2023-04-25 RX ADMIN — MICONAZOLE NITRATE: 20 OINTMENT TOPICAL at 08:04

## 2023-04-25 RX ADMIN — DOCUSATE SODIUM 100 MG: 100 CAPSULE, LIQUID FILLED ORAL at 08:04

## 2023-04-25 RX ADMIN — LACTULOSE 15 G: 20 SOLUTION ORAL at 08:04

## 2023-04-25 RX ADMIN — FLUTICASONE PROPIONATE 100 MCG: 50 SPRAY, METERED NASAL at 08:04

## 2023-04-25 RX ADMIN — ASPIRIN 81 MG CHEWABLE TABLET 81 MG: 81 TABLET CHEWABLE at 08:04

## 2023-04-25 RX ADMIN — IPRATROPIUM BROMIDE AND ALBUTEROL SULFATE 3 ML: .5; 3 SOLUTION RESPIRATORY (INHALATION) at 09:04

## 2023-04-25 RX ADMIN — MUPIROCIN: 20 OINTMENT TOPICAL at 09:04

## 2023-04-25 RX ADMIN — MUPIROCIN: 20 OINTMENT TOPICAL at 12:04

## 2023-04-25 NOTE — ASSESSMENT & PLAN NOTE
"Patient with Persistent (7 days or more) atrial fibrillation which is controlled currently with Calcium Channel Blocker. Patient is currently in atrial fibrillation.[id number] Score: 4. HASBLED Score:  Anticoagulation not on anticoagulation due to watchman device, on asa only     Normally rate controlled   afib rvr in the ED   IV cardizem given, BB on allergy list   6 beat run of v-tach overnight 4/22, pt asymptomatic    Cardiology consulted currently rate controlled and BP stable  -Can use CCB (PO dilt) if rate control needed in light of BB "allergy"  "

## 2023-04-25 NOTE — PROGRESS NOTES
"[facility]           Pharmacy  Admission Medication History     The home medication history was taken by Afshin Mcneil PharmD.      Medication history obtained from Medications listed below were obtained from: Patient/family and Analytic software- [employer]    Based on information gathered for medication list, you may go to "Admission" then "Reconcile Home Medications" tabs to review and/or act upon those items.     The home medication list has been updated by the Pharmacy department.   Please read ALL comments highlighted in yellow.   Please address this information as you see fit.    Feel free to contact us if you have any questions or require assistance.  No discrepancy noted  Potential issues to be addressed PRIOR TO DISCHARGE      No current facility-administered medications on file prior to encounter.     Current Outpatient Medications on File Prior to Encounter   Medication Sig Dispense Refill    acetaminophen (TYLENOL) 500 MG tablet Take 1,000 mg by mouth 2 (two) times a day.      aspirin (ECOTRIN) 81 MG EC tablet Take 1 tablet (81 mg total) by mouth once daily. 90 tablet 3    diclofenac sodium (VOLTAREN) 1 % Gel Apply 2 g topically 4 (four) times daily. Apply to right hip      furosemide (LASIX) 20 MG tablet Take 1 tablet (20 mg total) by mouth once daily. Hold for weights 150 lb or less, if weight is 155 or over, take 2 tablets in the morning 30 tablet 11    nystatin (MYCOSTATIN) powder Apply topically 2 (two) times daily. 30 g 1    pravastatin (PRAVACHOL) 40 MG tablet Take 1 tablet (40 mg total) by mouth once daily. 90 tablet 3    propylene glycol (SYSTANE COMPLETE OPHT) Apply to eye.      silver sulfADIAZINE 1% (SILVADENE) 1 % cream Apply to RLE skin breakdown twice daily (Patient taking differently: Apply topically 2 (two) times daily. Apply to RLE skin breakdown twice daily)      vitamin E 400 UNIT capsule Take 400 Units by mouth once daily.      QUEtiapine (SEROQUEL) 25 MG Tab Take " 1 tablet (25 mg total) by mouth every evening. (Patient not taking: Reported on 4/21/2023) 30 tablet 11    sodium zirconium cyclosilicate (LOKELMA) 5 gram packet Take 1 packet (5 g total) by mouth 2 (two) times a day. Mix entire contents of packet(s) into drinking glass containing 3 tablespoons of water; stir well and drink immediately. Add water and repeat until no powder remains to receive entire dose. (Patient not taking: Reported on 4/21/2023) 60 packet 3       Please address this information as you see fit.  Feel free to contact us if you have any questions or require assistance.    Afshin Mcneil, PharmD  [phone]

## 2023-04-25 NOTE — PROGRESS NOTES
[place] [facility]  Cardiology  Progress Note    Patient Name: Jenniffer Jimenez  MRN: [id number]  Admission Date: 4/21/2023  Hospital Length of Stay: 1 days  Code Status: Full Code   Attending Physician: Ari Medrano, *   Primary Care Physician: Rubi Young DO  Expected Discharge Date:   Principal Problem:Sepsis    Subjective:     Hospital Course:   Per consult noted by Dr. Casas 4/22/223-4/23/2023 89 y/o female who presented with worsening SOB and LE edema. She has a hx of Afib no AC s/p LAAO device (Watchman), HFpEF, HLD, DM, HTN, TIA. Has been having issues with LE cellulitis and volume status. In ED afib with RVR and given Dilt IV x 1 with improvement. HR ON 's. Fever 101.8. Tele with afib with 6 beat NSVT. Last 2DE with EF 55%. Arterial doppler reviewed.      4/23/2023 Overnight no acute events. No significant output documented. Audibly SOB.  4/24/2023  Remains on IV Lasix daily with 950cc out overnight negative 251cc since admission. HR and BP stable. BMP WNL. CBC with H&H 8.3 & 25.4 this AM. Remains rate controlled in afib with HR 70s  4/25/2023 Remains on IV Lasix with 700cc documented out overnight. BMP WNL. HR and BP stable. Cough and rhonchi noted on exam this AM           Review of Systems   Constitutional: Negative for chills, decreased appetite, diaphoresis and fever.   Cardiovascular:  Positive for dyspnea on exertion. Negative for chest pain, claudication, irregular heartbeat, leg swelling, near-syncope, orthopnea, palpitations, paroxysmal nocturnal dyspnea and syncope. Cyanosis: with coughing.  Respiratory:  Positive for cough. Negative for hemoptysis, shortness of breath and wheezing.    Gastrointestinal:  Negative for bloating, abdominal pain, constipation, diarrhea, melena, nausea and vomiting.   Neurological:  Negative for dizziness and weakness.   Objective:     Vital Signs (Most Recent):  Temp: 97.6 °F (36.4 °C) (04/25/23 0735)  Pulse: 76 (04/25/23 0921)  Resp: (!) 21  (04/25/23 0921)  BP: 134/60 (04/25/23 0735)  SpO2: 98 % (04/25/23 0921)   Vital Signs (24h Range):  Temp:  [97.4 °F (36.3 °C)-98.2 °F (36.8 °C)] 97.6 °F (36.4 °C)  Pulse:  [59-84] 76  Resp:  [18-21] 21  SpO2:  [94 %-100 %] 98 %  BP: (114-134)/(56-61) 134/60     Weight: 70.8 kg (156 lb)  Body mass index is 25.96 kg/m².     SpO2: 98 %         Intake/Output Summary (Last 24 hours) at 4/25/2023 1145  Last data filed at 4/24/2023 1658  Gross per 24 hour   Intake 120 ml   Output 300 ml   Net -180 ml       Lines/Drains/Airways       Peripheral Intravenous Line  Duration                  Peripheral IV - Single Lumen 04/21/23 1131 24 G Anterior;Distal;Right Forearm 4 days         Peripheral IV - Single Lumen 04/21/23 1515 20 G Left Antecubital 3 days                    Physical Exam  Constitutional:       General: She is not in acute distress.     Appearance: She is well-developed.   Cardiovascular:      Rate and Rhythm: Normal rate. Rhythm irregular.      Heart sounds: No murmur heard.    No gallop.   Pulmonary:      Effort: Pulmonary effort is normal. No respiratory distress.      Breath sounds: Rhonchi present. No wheezing.   Abdominal:      General: Bowel sounds are normal. There is no distension.      Palpations: Abdomen is soft.      Tenderness: There is no abdominal tenderness.   Skin:     General: Skin is warm and dry.   Neurological:      Mental Status: She is alert and oriented to person, place, and time.       Significant Labs: BMP:   Recent Labs   Lab 04/24/23  0529 04/25/23  0753   GLU 95 89    139   K 3.7 4.0    104   CO2 26 24   BUN 28 22   CREATININE 1.3 1.0   CALCIUM 8.2* 8.5*   MG 1.9  --     and CBC   Recent Labs   Lab 04/24/23  0529   WBC 3.35*   HGB 8.3*   HCT 25.4*          Significant Imaging: Echocardiogram: Transthoracic echo (TTE) complete (Cupid Only):   Results for orders placed or performed during the hospital encounter of 04/21/23   Echo   Result Value Ref Range    BSA 1.8 m2     TDI SEPTAL 0.14 m/s    LA WIDTH 4.20 cm    IVC diameter 2.21 cm    Left Ventricular Outflow Tract Mean Velocity 0.69 cm/s    Left Ventricular Outflow Tract Mean Gradient 2.10 mmHg    TDI LATERAL 0.16 m/s    LVIDd 4.59 3.5 - 6.0 cm    IVS 1.19 (A) 0.6 - 1.1 cm    Posterior Wall 1.21 (A) 0.6 - 1.1 cm    Ao root annulus 3.45 cm    LVIDs 3.19 2.1 - 4.0 cm    FS 31 28 - 44 %    LA volume 101.04 cm3    LV mass 204.30 g    LA size 4.72 cm    RVDD 3.14 cm    RV S' 0.01 cm/s    Left Ventricle Relative Wall Thickness 0.53 cm    AV mean gradient 4 mmHg    AV valve area 2.18 cm2    AV Velocity Ratio 0.66     AV index (prosthetic) 0.77     Mean e' 0.15 m/s    IVRT 65.65 msec    Pulm vein S/D ratio 0.49     LVOT diameter 1.90 cm    LVOT area 2.8 cm2    LVOT peak rg 0.96 m/s    LVOT peak VTI 21.20 cm    Ao peak rg 1.46 m/s    Ao VTI 27.5 cm    Mr max rg 3.07 m/s    LVOT stroke volume 60.08 cm3    AV peak gradient 9 mmHg    TR Max Rg 3.22 m/s    PV Peak S Rg 0.29 m/s    PV Peak D Rg 0.59 m/s    LV Systolic Volume 40.59 mL    LV Systolic Volume Index 22.8 mL/m2    LV Diastolic Volume 96.67 mL    LV Diastolic Volume Index 54.31 mL/m2    LA Volume Index 56.8 mL/m2    LV Mass Index 115 g/m2    RA Major Axis 6.31 cm    Left Atrium Minor Axis 5.85 cm    Left Atrium Major Axis 6.15 cm    Triscuspid Valve Regurgitation Peak Gradient 41 mmHg    LA Volume Index (Mod) 48.7 mL/m2    LA volume (mod) 86.68 cm3    RA Width 4.50 cm    Gill's Biplane MOD Ejection Fraction 5 %    Right Atrial Pressure (from IVC) 15 mmHg    EF 55 %    TV rest pulmonary artery pressure 56 mmHg    Narrative    · The left ventricle is normal in size with concentric hypertrophy and   normal systolic function.  · The estimated ejection fraction is 55%.  · Indeterminate left ventricular diastolic function.  · With normal right ventricular systolic function.  · Severe left atrial enlargement.  · Severe right atrial enlargement.  · Mild mitral regurgitation.  ·  Mild to moderate tricuspid regurgitation.  · There is pulmonary hypertension.  · The estimated PA systolic pressure is 56 mmHg.  · Elevated central venous pressure (15 mmHg).        Assessment and Plan:     Brief HPI: Seen this morning on AM NP rounds while resting in bed. Complains of cough and congestion different than presentation. Denies any chest pain. Discussed POC as detailed below-verbalized understanding and agrees with POC     Chronic diastolic heart failure  - echo with normal LVEF; reports drinking fluids (Gatorade) as an outpatient likely the cause of ADHF  - diuresis in process with IV lasix 40mg daily with 700cc out overnight negative 711cc since admission ?accuracy  - reports SOB improving but then recurred this AM with coughing; rhonchi noted on exam and patient reports SOB different than presentation  - goal BP less than 130/80and well controlled  - considered transition to oral Lasix but will continue IV Lasix for now; once transitioned to oral Lasix will up titrate slightly to  40mg po daily with close monitoring     Chronic a-fib  - rate controlled with HR 70s  - not on BB as an outpatient  - no chronic OAC given Watchman      Primary hypertension  - SBP 120s-130s overnight  - goal BP less than 130/80  - no antihypertensives noted on home med rec  - BP well controlled; continue to monitor     Pure hypercholesterolemia  - on Pravastatin as an outpatient  - goal LDL less than   - continue Pravastatin; cholesterol controlled         VTE Risk Mitigation (From admission, onward)         Ordered     enoxaparin injection 40 mg  Daily         04/25/23 0946     IP VTE HIGH RISK PATIENT  Once         04/21/23 1507     Place sequential compression device  Until discontinued         04/21/23 1507                VANESSA Miranda, ANP  Cardiology  [place] - Med Surg

## 2023-04-25 NOTE — ASSESSMENT & PLAN NOTE
- echo with normal LVEF; reports drinking fluids (Gatorade) as an outpatient likely the cause of ADHF  - diuresis in process with IV lasix 40mg daily with 700cc out overnight negative 711cc since admission ?accuracy  - reports SOB improving but then recurred this AM with coughing; rhonchi noted on exam and patient reports SOB different than presentation  - goal BP less than 130/80and well controlled  - considered transition to oral Lasix but will continue IV Lasix for now; once transitioned to oral Lasix will up titrate slightly to  40mg po daily with close monitoring

## 2023-04-25 NOTE — PROGRESS NOTES
Pharmacist Renal Dose Adjustment Note    Jenniffer Jimenez is a 89 y.o. female being treated with the medication enoxaparin    Patient Data:    Vital Signs (Most Recent):  Temp: 97.6 °F (36.4 °C) (04/25/23 0735)  Pulse: 76 (04/25/23 0921)  Resp: (!) 21 (04/25/23 0921)  BP: 134/60 (04/25/23 0735)  SpO2: 98 % (04/25/23 0921)   Vital Signs (72h Range):  Temp:  [97.4 °F (36.3 °C)-98.4 °F (36.9 °C)]   Pulse:  [59-90]   Resp:  [16-21]   BP: ()/(49-94)   SpO2:  [94 %-100 %]      Recent Labs   Lab 04/23/23  0642 04/24/23  0529 04/25/23  0753   CREATININE 1.2 1.3 1.0     Serum creatinine: 1 mg/dL 04/25/23 0753  Estimated creatinine clearance: 36.2 mL/min    Medication:enoxaparin dose: 30mg frequency q24h will be changed to medication:enoxaparin dose:40mg frequency:q24h    Pharmacist's Name: Afshin Mcneil  Pharmacist's Extension: [phone]

## 2023-04-25 NOTE — PLAN OF CARE
Problem: Physical Therapy  Goal: Physical Therapy Goal  Description: Goals to be met by: 23     Patient will increase functional independence with mobility by performin. Supine to sit with Modified [place]  2. Sit to supine with Modified [place]  3. Sit to stand transfer with Modified [place]  4. Bed to chair transfer with Supervision using Rolling Walker  5. Gait  x >100' feet with Supervision using Rolling Walker.     Outcome: Ongoing, Progressing   Patient sitting in bedside chair; O2 sats 100% HR 77<->88 on 1L O2; pt performed BLE AROM ex with instruction in pursed lip breathing for SOB, counting out loud to keep from holding her breath; pt coughing throughout session intermittently; then scooted to EOB with SBA and VCs for technique; sit to stand with VCs/TCs for hand and foot placement and [place]; pt amb ~24ft using RW with max VCs for RW management/keeping it within close proximity, increasing step length and chest/head lift; pt somewhat impulsive during turn to sit - pt letting go of RW when not fully turned to sit; pt returned to sitting and elevated LEs; pt fatigued following session; O2 sats 99% HR 77; cont to recommend SNF to maximize functional status.

## 2023-04-25 NOTE — PT/OT/SLP PROGRESS
"Physical Therapy Treatment    Patient Name:  Jenniffer Jimenez   MRN:  [id number]    Recommendations:     Discharge Recommendations: nursing facility, skilled  Discharge Equipment Recommendations:  (defer to next level of care)  Barriers to discharge: Decreased caregiver support    Assessment:     Jenniffer Jimenez is a 89 y.o. female admitted with a medical diagnosis of Acute on chronic diastolic heart failure.  She presents with the following impairments/functional limitations: weakness, impaired endurance, impaired self care skills, impaired functional mobility, gait instability, impaired balance, decreased coordination, decreased upper extremity function, decreased lower extremity function, decreased safety awareness, impaired skin, pain, impaired cardiopulmonary response to activity, impaired coordination     Rehab Prognosis: Fair; patient would benefit from acute skilled PT services to address these deficits and reach maximum level of function.    Recent Surgery: * No surgery found *      Plan:     During this hospitalization, patient to be seen 5 x/week to address the identified rehab impairments via gait training, therapeutic activities, therapeutic exercises, neuromuscular re-education and progress toward the following goals:    Plan of Care Expires:  05/24/23    Subjective     Chief Complaint: "I would like to go [facility]"  Patient/Family Comments/goals: return to PLOF  Pain/Comfort:  Pain Rating 1: 0/10  Pain Rating Post-Intervention 1: 0/10      Objective:     Communicated with nurse prior to session.  Patient found up in chair with bed alarm, telemetry, oxygen, pulse ox (continuous) upon PT entry to room.     General Precautions: Standard, fall, hearing impaired  Orthopedic Precautions: N/A  Braces: N/A  Respiratory Status: Nasal cannula, flow 1 L/min     Functional Mobility:  Bed Mobility:  Patient already sitting up in bedside chair  Transfers:     Sit to Stand:  minimum " assistance with rolling walker  Gait: pt amb ~24ft using RW with max VCs for RW management/keeping it within close proximity, increasing step length and chest/head lift      AM-PAC 6 CLICK MOBILITY  Turning over in bed (including adjusting bedclothes, sheets and blankets)?: 3  Sitting down on and standing up from a chair with arms (e.g., wheelchair, bedside commode, etc.): 3  Moving from lying on back to sitting on the side of the bed?: 3  Moving to and from a bed to a chair (including a wheelchair)?: 3  Need to walk in hospital room?: 3  Climbing 3-5 steps with a railing?: 2  Basic Mobility Total Score: 17       Treatment & Education:  Patient sitting in bedside chair; O2 sats 100% HR 77<->88 on 1L O2; pt performed BLE AROM ex with instruction in pursed lip breathing for SOB, counting out loud to keep from holding her breath; performed 20 APs, 10 ankle circles, 10 hip/knee fl, 10 hip abd/add, 10 QS, 10 GS, 10 SLR, BUE alternating rows x 10; pt coughing throughout session intermittently; then scooted to EOB with SBA and VCs for technique; sit to stand with VCs/TCs for hand and foot placement and Everardo; pt amb ~24ft using RW with max VCs for RW management/keeping it within close proximity, increasing step length and chest/head lift; pt somewhat impulsive during turn to sit - pt letting go of RW when not fully turned to sit; pt returned to sitting and elevated LEs; pt fatigued following session; O2 sats 99% HR 77; cont to recommend SNF to maximize functional status.    Patient left up in chair with all lines intact, call button in reach, chair alarm on, and nurse notified..    GOALS:   Multidisciplinary Problems       Physical Therapy Goals          Problem: Physical Therapy    Goal Priority Disciplines Outcome Goal Variances Interventions   Physical Therapy Goal     PT, PT/OT Ongoing, Progressing     Description: Goals to be met by: 5/23/23     Patient will increase functional independence with mobility by  performin. Supine to sit with Modified [place]  2. Sit to supine with Modified [place]  3. Sit to stand transfer with Modified [place]  4. Bed to chair transfer with Supervision using Rolling Walker  5. Gait  x >100' feet with Supervision using Rolling Walker.                          Time Tracking:     PT Received On: 23  PT Start Time: 1440     PT Stop Time: 1518  PT Total Time (min): 38 min     Billable Minutes: Gait Training 10, Therapeutic Activity 13, and Therapeutic Exercise 15    Treatment Type: Treatment  PT/PTA: PT     Number of PTA visits since last PT visit: 0     2023

## 2023-04-25 NOTE — PLAN OF CARE
Pt sat's is maintain at 94% on 1 LPM on nasal cannula. Pt perform incentive spirometry achieving 800 ml.

## 2023-04-25 NOTE — PROGRESS NOTES
[facility] Medicine  Progress Note    Patient Name: Jenniffer Jimenez  MRN: [id number]  Patient Class: IP- Inpatient   Admission Date: 4/21/2023  Length of Stay: 1 days  Attending Physician: Ari Medrano, *  Primary Care Provider: Rubi Young DO        Subjective:     Principal Problem:Acute on chronic diastolic heart failure        HPI:  89 yo female with a PMH of cataract, Ramona, CKD, DM II, diabetic polyneuropathy, HTN, PAF, TIA presents to the ED For evaluation of fever noted by home health nurse. RLE appears edematous, erythematous, weeping worse for unknown duration. She also has a rash to her perineal area and back. She notes pain to the RLE. Since in the ED she was noted to be in afib RVR, which is now rate controlled with a dose of IV cardizem. Patient is a poor historian, yelling, hx of psychiatric problem.     I tried to phone POA for further hx- no answer.     Current OP medications as reviewed per recent cardiology note:       acetaminophen (TYLENOL) 500 MG tablet, Take 1,000 mg by mouth 2 (two) times a day., Disp: , Rfl:     aspirin (ECOTRIN) 81 MG EC tablet, Take 1 tablet (81 mg total) by mouth once daily., Disp: 90 tablet, Rfl: 3    diclofenac sodium (VOLTAREN) 1 % Gel, Apply 2 g topically 4 (four) times daily. Use gloves to apply to right hip for 10 days, Disp: 100 g, Rfl: 1    miconazole NITRATE 2 % (MICOTIN) 2 % top powder, Apply topically 2 (two) times daily. To RLE., Disp: , Rfl: 0    nystatin (MYCOSTATIN) powder, Apply topically 2 (two) times daily., Disp: 30 g, Rfl: 1    pravastatin (PRAVACHOL) 40 MG tablet, Take 1 tablet (40 mg total) by mouth once daily., Disp: 90 tablet, Rfl: 3    silver sulfADIAZINE 1% (SILVADENE) 1 % cream, Apply to RLE skin breakdown twice daily, Disp: , Rfl:     triamcinolone acetonide 0.5% (KENALOG) 0.5 % Crea, Apply to legs with dressing changes., Disp: 454 g, Rfl: 0    vit C/E/Zn/coppr/lutein/zeaxan (OCUVITE LUTEIN AND ZEAXANTHIN  ORAL), Take 1 capsule by mouth once daily. Lutien 25 mg, Zeaxanthin 5 mg, Disp: , Rfl:     vitamin E 400 UNIT capsule, Take 400 Units by mouth once daily., Disp: , Rfl:     furosemide (LASIX) 20 MG tablet, Take 1 tablet (20 mg total) by mouth once daily. Hold for weights 150 lb or less, Disp: 30 tablet, Rfl: 11    QUEtiapine (SEROQUEL) 25 MG Tab, Take 1 tablet (25 mg total) by mouth every evening. (Patient not taking: Reported on 4/12/2023), Disp: 30 tablet, Rfl: 11    sodium zirconium cyclosilicate (LOKELMA) 5 gram packet, Take 1 packet (5 g total) by mouth 2 (two) times a day. Mix entire contents of packet(s) into drinking glass containing 3 tablespoons of water; stir well and drink immediately. Add water and repeat until no powder remains to receive entire dose., Disp: 60 packet, Rfl: 3       Overview/Hospital Course:  Admitted for AFib RVR and fever, presumed cellulitis to right lower extremity.  She was treated with IV Cardizem for RVR.  Beta-blockers are listed as allergies on her allergy profile.  She was treated with IV vancomycin and Zosyn.  She was also treated with IV Lasix for acute exacerbation of diastolic heart failure  She is not yet euvolemic.  We will need to continue Lasix at this time due to lung crackles, cough.  Per ID 4/24 low concern for an active infection going on in the wound.     - Stop antibiotics  - Continue to monitor for systemic signs of infection  - Aggressive wound care  - Followup with Dr. Deshpande at clinic here upon discharge     Patient may discharge when euvolemic  Continue physical and occupational therapy, wound care  She will need skilled nursing placement           Interval hx:  Appears to be in good spirits.  Wheezing this morning but breathing did improve following a treatment.  She has been out of bed.  Still with some cough today    Objective:     Vital Signs (Most Recent):  Temp: 97.5 °F (36.4 °C) (04/25/23 1100)  Pulse: 76 (04/25/23 1221)  Resp: 20 (04/25/23  1100)  BP: (!) 130/59 (04/25/23 1100)  SpO2: 98 % (04/25/23 1545)   Vital Signs (24h Range):  Temp:  [97.4 °F (36.3 °C)-98.2 °F (36.8 °C)] 97.5 °F (36.4 °C)  Pulse:  [59-84] 76  Resp:  [18-21] 20  SpO2:  [94 %-100 %] 98 %  BP: (114-134)/(56-61) 130/59     Weight: 70.8 kg (156 lb)  Body mass index is 25.96 kg/m².    Physical Exam  Constitutional:       Appearance: She is ill-appearing.   HENT:      Head: Normocephalic and atraumatic.      Mouth/Throat:      Pharynx: Oropharynx is clear.   Eyes:      Conjunctiva/sclera: Conjunctivae normal.   Cardiovascular:      Rate and Rhythm: Normal rate. Rhythm irregular.      Pulses: Normal pulses.      Heart sounds: Normal heart sounds.   Pulmonary:      Effort: Pulmonary effort is normal.      Comments: Coarse, but improving   Abdominal:      General: Abdomen is flat. Bowel sounds are normal.      Palpations: Abdomen is soft.   Musculoskeletal:         General: Swelling and tenderness present.      Cervical back: Normal range of motion and neck supple.   Skin:     General: Skin is warm and dry.      Findings: Rash present.      Comments: RLE edematous, erythematous, weeping, tender, warm  Palpable pulse     LLE with skin tear and rash, does not appear infected     Noted yeast type rash to perineal area while she is sitting on BS commode     Erythematous rash to back    Neurological:      General: No focal deficit present.      Mental Status: She is alert.   Psychiatric:         Mood and Affect: Mood is not anxious.         Behavior: Behavior is not agitated.           Significant Labs: All pertinent labs within the past 24 hours have been reviewed.    Significant Imaging: I have reviewed all pertinent imaging results/findings within the past 24 hours.  Review of Systems      Assessment/Plan:      * Acute on chronic diastolic heart failure    Patient is identified as having Diastolic (HFpEF) heart failure that is acute on Chronic. CHF is currently uncontrolled. Latest ECHO  performed and demonstrates- Results for orders placed during the hospital encounter of 10/07/22    Echo     The left ventricle is normal in size with concentric hypertrophy and normal systolic function.   The estimated ejection fraction is 55%.   Indeterminate left ventricular diastolic function.   With normal right ventricular systolic function.   Severe left atrial enlargement.   Severe right atrial enlargement.   Mild mitral regurgitation.   Mild to moderate tricuspid regurgitation.   There is pulmonary hypertension.   The estimated PA systolic pressure is 56 mmHg.   Elevated central venous pressure (15 mmHg  . Continue Furosemide and monitor clinical status closely. Monitor on telemetry. Patient is off CHF pathway.  Monitor strict Is&Os and daily weights.  Place on fluid restriction of 1.5 L. Continue to stress to patient importance of self efficacy and  on diet for CHF. Last BNP reviewed- and noted below   Recent Labs   Lab 04/21/23  1132   *   .    S/p sepsis IVF. Appears volume overloaded - lasix 20 mg IV x1  4/22 then may continue lasix at home dose PO     Strict I&O    Low na diet   Following cardiology recs       Continue lasix at 40 mg IV daily - patient needs increased diuresis   Incentive spirometry   CXR: The heart is enlarged.  Calcified atheromatous disease affects the aorta.  There is pulmonary vascular congestion with mild edema.  A left-sided pleural effusion is present.  Findings appear slightly worsened as compared to the previous study of 04/21/2023 4/24  Lung sounds improving, however still with some crackles, cough and SOB, improving from yesterday, but not at baseline. On 2L NC. Does not wear home oxygen  Continue diuresis      4/25  -continue diuresis today; some significant wheezing on exam   -weaning oxygen down as tolerated    Constipation  Add colace   Add lactulose     Microcytic anemia  hgb 9.8--> 7.3--> 8.3  Anemia panel   Stool for OCB   Type and screen  "  UA without blood   Close monitoring   Transfuse if Hgb < 7 or symptomatic     Lab Results   Component Value Date    IRON <10 (L) 04/22/2023    TRANSFERRIN 165 (L) 04/22/2023    TIBC 244 (L) 04/22/2023    FESATURATED Unable to calculate 04/22/2023      Add iron PO     Presence of Watchman left atrial appendage closure device  -noted   -no need for anticoagulation      Venous insufficiency of both lower extremities  Palpable R dorsalis pedal pulse   Will US RLE arterial for further vascular assessment   No evidence of high-grade stenosis of the right lower extremity arteries.  2. Monophasic waveforms in the popliteal artery and calf arteries, likely related to vessel hardening from atherosclerotic disease.    Check venous doppler to RO DVT No evidence of deep venous thrombosis in the right lower extremity.     Right lower extremity nonspecific subcutaneous edema, with right groin few prominent but nonenlarged lymph nodes.    Prediabetes    Lab Results   Component Value Date    HGBA1C 6.0 (H) 01/26/2023     Low CSI   Consistent carb diet     History of TIA (transient ischemic attack)  Continue asa, statin     Stage 3b chronic kidney disease  Cr 1.1  Baseline     Chronic a-fib  Patient with Persistent (7 days or more) atrial fibrillation which is controlled currently with Calcium Channel Blocker. Patient is currently in atrial fibrillation.[id number] Score: 4. HASBLED Score:  Anticoagulation not on anticoagulation due to watchman device, on asa only     Normally rate controlled   afib rvr in the ED   IV cardizem given, BB on allergy list   6 beat run of v-tach overnight 4/22, pt asymptomatic    Cardiology consulted currently rate controlled and BP stable  -Can use CCB (PO dilt) if rate control needed in light of BB "allergy"    Debility  -PT/OT recommend SNF      Primary hypertension  Hold medications for now; can resume as becomes hypertensive    Pure hypercholesterolemia  -continue statin     VTE Risk Mitigation " (From admission, onward)         Ordered     enoxaparin injection 40 mg  Daily         04/25/23 0946     IP VTE HIGH RISK PATIENT  Once         04/21/23 1507     Place sequential compression device  Until discontinued         04/21/23 1507                Discharge Planning   GILES:      Code Status: Full Code   Is the patient medically ready for discharge?:     Reason for patient still in hospital (select all that apply): Patient trending condition, Treatment, PT / OT recommendations and Pending disposition  Discharge Plan A: Independent living facility                  Ari Medrano MD  Department of [facility] Medicine   [facility]

## 2023-04-25 NOTE — PT/OT/SLP PROGRESS
"Occupational Therapy   Treatment    Name: Jenniffer Jimenez  MRN: [id number]  Admitting Diagnosis:  Sepsis       Recommendations:     Discharge Recommendations: nursing facility, skilled  Discharge Equipment Recommendations:  to be determined by next level of care  Barriers to discharge:  Decreased caregiver support    Assessment:     Jenniffer Jimenez is a 89 y.o. female with a medical diagnosis of Sepsis. Performance deficits affecting function are weakness, impaired endurance, impaired self care skills, impaired functional mobility, gait instability, impaired balance, decreased coordination, decreased upper extremity function, decreased lower extremity function, decreased safety awareness, impaired skin, pain, impaired cardiopulmonary response to activity, impaired coordination.     Rehab Prognosis:  Fair; patient would benefit from acute skilled OT services to address these deficits and reach maximum level of function.       Plan:     Patient to be seen 5 x/week to address the above listed problems via self-care/home management, therapeutic activities, therapeutic exercises  Plan of Care Expires: 05/23/23  Plan of Care Reviewed with: patient    Subjective     Chief Complaint: "I want to speak with the "  Patient/Family Comments/goals: return to PLOF  Pain/Comfort:  Pain Rating 1: 0/10  Pain Rating Post-Intervention 1: 0/10    Objective:     Communicated with: Funmi osullivan prior to session.  Patient found HOB elevated with bed alarm, telemetry, oxygen, pulse ox (continuous) upon OT entry to room.    General Precautions: Standard, fall, hearing impaired      Bed Mobility:    Patient completed Scooting/Bridging with contact guard assistance  Patient completed Supine to Sit with contact guard assistance and with side rail     Functional Mobility/Transfers:  Patient completed Sit <> Stand Transfer with minimum assistance  with  rolling walker   Patient completed Bed <> Chair Transfer using Step " Transfer technique with minimum assistance with rolling walker  Patient completed Toilet Transfer Step Transfer technique with minimum assistance with  rolling walker    Activities of Daily Living:  Grooming: [place] for HH    Toileting: total assistance for hygiene /p urinating and small BM    [facility] 6 Click ADL: 16    Treatment & Education:  Patient perseverating on speaking /c  again (nsg notified and aware). Patient requires constant VCs for all tasks/transitions. Impaired safety awareness throughout. Patient ambulates /c short, shuffling steps and requires max cues for RW mgmt/proximity and increasing step length. Patient t/f to bedside chair and BLEs elevated and reclined for comfort and safety.    Patient left up in chair with all lines intact, call button in reach, and P.T. present    GOALS:   Multidisciplinary Problems       Occupational Therapy Goals          Problem: Occupational Therapy    Goal Priority Disciplines Outcome Interventions   Occupational Therapy Goal     OT, PT/OT Ongoing, Progressing    Description: Goals to be met by: 05/23/23     Patient will increase functional independence with ADLs by performing:    UE Dressing with Set-up Assistance.  LE Dressing with Minimal Assistance.  Grooming while EOB with Modified [place].  Toileting from bedside commode with Supervision for hygiene and clothing management.   Toilet transfer to bedside commode with Supervision.  Upper extremity exercise program 3x10 reps per handout, with supervision.                         Time Tracking:     OT Date of Treatment: 04/25/23  OT Start Time: 1416  OT Stop Time: 1439  OT Total Time (min): 23 min    Billable Minutes:Self Care/Home Management 15  Therapeutic Activity 8    OT/ROMERO: ROMERO     Number of ROMERO visits since last OT visit: 2    4/25/2023

## 2023-04-25 NOTE — SUBJECTIVE & OBJECTIVE
Interval hx:  Appears to be in good spirits.  Wheezing this morning but breathing did improve following a treatment.  She has been out of bed.  Still with some cough today    Objective:     Vital Signs (Most Recent):  Temp: 97.5 °F (36.4 °C) (04/25/23 1100)  Pulse: 76 (04/25/23 1221)  Resp: 20 (04/25/23 1100)  BP: (!) 130/59 (04/25/23 1100)  SpO2: 98 % (04/25/23 1545)   Vital Signs (24h Range):  Temp:  [97.4 °F (36.3 °C)-98.2 °F (36.8 °C)] 97.5 °F (36.4 °C)  Pulse:  [59-84] 76  Resp:  [18-21] 20  SpO2:  [94 %-100 %] 98 %  BP: (114-134)/(56-61) 130/59     Weight: 70.8 kg (156 lb)  Body mass index is 25.96 kg/m².    Physical Exam  Constitutional:       Appearance: She is ill-appearing.   HENT:      Head: Normocephalic and atraumatic.      Mouth/Throat:      Pharynx: Oropharynx is clear.   Eyes:      Conjunctiva/sclera: Conjunctivae normal.   Cardiovascular:      Rate and Rhythm: Normal rate. Rhythm irregular.      Pulses: Normal pulses.      Heart sounds: Normal heart sounds.   Pulmonary:      Effort: Pulmonary effort is normal.      Comments: Coarse, but improving   Abdominal:      General: Abdomen is flat. Bowel sounds are normal.      Palpations: Abdomen is soft.   Musculoskeletal:         General: Swelling and tenderness present.      Cervical back: Normal range of motion and neck supple.   Skin:     General: Skin is warm and dry.      Findings: Rash present.      Comments: RLE edematous, erythematous, weeping, tender, warm  Palpable pulse     LLE with skin tear and rash, does not appear infected     Noted yeast type rash to perineal area while she is sitting on BS commode     Erythematous rash to back    Neurological:      General: No focal deficit present.      Mental Status: She is alert.   Psychiatric:         Mood and Affect: Mood is not anxious.         Behavior: Behavior is not agitated.           Significant Labs: All pertinent labs within the past 24 hours have been reviewed.    Significant Imaging: I  have reviewed all pertinent imaging results/findings within the past 24 hours.  Review of Systems

## 2023-04-25 NOTE — SUBJECTIVE & OBJECTIVE
Review of Systems   Constitutional: Negative for chills, decreased appetite, diaphoresis and fever.   Cardiovascular:  Positive for dyspnea on exertion. Negative for chest pain, claudication, irregular heartbeat, leg swelling, near-syncope, orthopnea, palpitations, paroxysmal nocturnal dyspnea and syncope. Cyanosis: with coughing.  Respiratory:  Positive for cough. Negative for hemoptysis, shortness of breath and wheezing.    Gastrointestinal:  Negative for bloating, abdominal pain, constipation, diarrhea, melena, nausea and vomiting.   Neurological:  Negative for dizziness and weakness.   Objective:     Vital Signs (Most Recent):  Temp: 97.6 °F (36.4 °C) (04/25/23 0735)  Pulse: 76 (04/25/23 0921)  Resp: (!) 21 (04/25/23 0921)  BP: 134/60 (04/25/23 0735)  SpO2: 98 % (04/25/23 0921)   Vital Signs (24h Range):  Temp:  [97.4 °F (36.3 °C)-98.2 °F (36.8 °C)] 97.6 °F (36.4 °C)  Pulse:  [59-84] 76  Resp:  [18-21] 21  SpO2:  [94 %-100 %] 98 %  BP: (114-134)/(56-61) 134/60     Weight: 70.8 kg (156 lb)  Body mass index is 25.96 kg/m².     SpO2: 98 %         Intake/Output Summary (Last 24 hours) at 4/25/2023 1145  Last data filed at 4/24/2023 1658  Gross per 24 hour   Intake 120 ml   Output 300 ml   Net -180 ml       Lines/Drains/Airways       Peripheral Intravenous Line  Duration                  Peripheral IV - Single Lumen 04/21/23 1131 24 G Anterior;Distal;Right Forearm 4 days         Peripheral IV - Single Lumen 04/21/23 1515 20 G Left Antecubital 3 days                    Physical Exam  Constitutional:       General: She is not in acute distress.     Appearance: She is well-developed.   Cardiovascular:      Rate and Rhythm: Normal rate. Rhythm irregular.      Heart sounds: No murmur heard.    No gallop.   Pulmonary:      Effort: Pulmonary effort is normal. No respiratory distress.      Breath sounds: Rhonchi present. No wheezing.   Abdominal:      General: Bowel sounds are normal. There is no distension.       Palpations: Abdomen is soft.      Tenderness: There is no abdominal tenderness.   Skin:     General: Skin is warm and dry.   Neurological:      Mental Status: She is alert and oriented to person, place, and time.       Significant Labs: BMP:   Recent Labs   Lab 04/24/23  0529 04/25/23  0753   GLU 95 89    139   K 3.7 4.0    104   CO2 26 24   BUN 28 22   CREATININE 1.3 1.0   CALCIUM 8.2* 8.5*   MG 1.9  --     and CBC   Recent Labs   Lab 04/24/23  0529   WBC 3.35*   HGB 8.3*   HCT 25.4*          Significant Imaging: Echocardiogram: Transthoracic echo (TTE) complete (Cupid Only):   Results for orders placed or performed during the hospital encounter of 04/21/23   Echo   Result Value Ref Range    BSA 1.8 m2    TDI SEPTAL 0.14 m/s    LA WIDTH 4.20 cm    IVC diameter 2.21 cm    Left Ventricular Outflow Tract Mean Velocity 0.69 cm/s    Left Ventricular Outflow Tract Mean Gradient 2.10 mmHg    TDI LATERAL 0.16 m/s    LVIDd 4.59 3.5 - 6.0 cm    IVS 1.19 (A) 0.6 - 1.1 cm    Posterior Wall 1.21 (A) 0.6 - 1.1 cm    Ao root annulus 3.45 cm    LVIDs 3.19 2.1 - 4.0 cm    FS 31 28 - 44 %    LA volume 101.04 cm3    LV mass 204.30 g    LA size 4.72 cm    RVDD 3.14 cm    RV S' 0.01 cm/s    Left Ventricle Relative Wall Thickness 0.53 cm    AV mean gradient 4 mmHg    AV valve area 2.18 cm2    AV Velocity Ratio 0.66     AV index (prosthetic) 0.77     Mean e' 0.15 m/s    IVRT 65.65 msec    Pulm vein S/D ratio 0.49     LVOT diameter 1.90 cm    LVOT area 2.8 cm2    LVOT peak rg 0.96 m/s    LVOT peak VTI 21.20 cm    Ao peak rg 1.46 m/s    Ao VTI 27.5 cm    Mr max rg 3.07 m/s    LVOT stroke volume 60.08 cm3    AV peak gradient 9 mmHg    TR Max Rg 3.22 m/s    PV Peak S Rg 0.29 m/s    PV Peak D Rg 0.59 m/s    LV Systolic Volume 40.59 mL    LV Systolic Volume Index 22.8 mL/m2    LV Diastolic Volume 96.67 mL    LV Diastolic Volume Index 54.31 mL/m2    LA Volume Index 56.8 mL/m2    LV Mass Index 115 g/m2    RA Major Axis  6.31 cm    Left Atrium Minor Axis 5.85 cm    Left Atrium Major Axis 6.15 cm    Triscuspid Valve Regurgitation Peak Gradient 41 mmHg    LA Volume Index (Mod) 48.7 mL/m2    LA volume (mod) 86.68 cm3    RA Width 4.50 cm    Gill's Biplane MOD Ejection Fraction 5 %    Right Atrial Pressure (from IVC) 15 mmHg    EF 55 %    TV rest pulmonary artery pressure 56 mmHg    Narrative    · The left ventricle is normal in size with concentric hypertrophy and   normal systolic function.  · The estimated ejection fraction is 55%.  · Indeterminate left ventricular diastolic function.  · With normal right ventricular systolic function.  · Severe left atrial enlargement.  · Severe right atrial enlargement.  · Mild mitral regurgitation.  · Mild to moderate tricuspid regurgitation.  · There is pulmonary hypertension.  · The estimated PA systolic pressure is 56 mmHg.  · Elevated central venous pressure (15 mmHg).

## 2023-04-25 NOTE — ASSESSMENT & PLAN NOTE
This patient does have evidence of infective focus  My overall impression is sepsis.  Source: Skin and Soft Tissue (location RLE)  Antibiotics given-   Antibiotics (72h ago, onward)    Start     Stop Route Frequency Ordered    04/25/23 1100  mupirocin 2 % ointment         04/30 0859 Nasl 2 times daily 04/25/23 0946        Latest lactate reviewed-  No results for input(s): LACTATE in the last 72 hours.  Organ dysfunction indicated by no organ dysfunction     Fluid challenge Actual Body weight- Patient will receive 30ml/kg actual body weight to calculate fluid bolus for treatment of septic shock.     Post- resuscitation assessment Yes Perfusion exam was performed within 6 hours of septic shock presentation after bolus shows Adequate tissue perfusion assessed by non-invasive monitoring       Will Not start Pressors- Levophed for MAP of 65  Source control achieved by: IV vancomycin and zosyn     Wound care   PRN tylenol for pain or fever. Patient has multiple allergies  Diflucan and topical tx for yeast   Blood cx pending NGTD    ESR, CRP - elevated, xray RLE to r/o osteo with chronic wound. No evidence of osteo. Will MRI given ESR, CRP elevated       -given multiple positive wound cx in the past to lower extremities will consult ID to guide abx        PT/OT for mobility- recommending SNF

## 2023-04-25 NOTE — ASSESSMENT & PLAN NOTE
- SBP 120s-130s overnight  - goal BP less than 130/80  - no antihypertensives noted on home med rec  - BP well controlled; continue to monitor

## 2023-04-26 PROBLEM — J18.9 COMMUNITY ACQUIRED PNEUMONIA OF LEFT LOWER LOBE OF LUNG: Status: ACTIVE | Noted: 2023-04-26

## 2023-04-26 PROBLEM — K59.00 CONSTIPATION: Status: RESOLVED | Noted: 2023-04-23 | Resolved: 2023-04-26

## 2023-04-26 LAB
ANION GAP SERPL CALC-SCNC: 11 MMOL/L (ref 8–16)
BACTERIA BLD CULT: NORMAL
BACTERIA BLD CULT: NORMAL
BUN SERPL-MCNC: 22 MG/DL (ref 10–30)
CALCIUM SERPL-MCNC: 8.3 MG/DL (ref 8.7–10.5)
CHLORIDE SERPL-SCNC: 103 MMOL/L (ref 95–110)
CO2 SERPL-SCNC: 25 MMOL/L (ref 23–29)
CREAT SERPL-MCNC: 0.9 MG/DL (ref 0.5–1.4)
EST. GFR  (NO RACE VARIABLE): >60 ML/MIN/1.73 M^2
GLUCOSE SERPL-MCNC: 100 MG/DL (ref 70–110)
POCT GLUCOSE: 103 MG/DL (ref 70–110)
POCT GLUCOSE: 121 MG/DL (ref 70–110)
POCT GLUCOSE: 160 MG/DL (ref 70–110)
POCT GLUCOSE: 84 MG/DL (ref 70–110)
POTASSIUM SERPL-SCNC: 3.6 MMOL/L (ref 3.5–5.1)
SODIUM SERPL-SCNC: 139 MMOL/L (ref 136–145)

## 2023-04-26 PROCEDURE — 97110 THERAPEUTIC EXERCISES: CPT | Mod: HCNC,CQ

## 2023-04-26 PROCEDURE — 94761 N-INVAS EAR/PLS OXIMETRY MLT: CPT | Mod: HCNC

## 2023-04-26 PROCEDURE — 80048 BASIC METABOLIC PNL TOTAL CA: CPT | Mod: HCNC | Performed by: HOSPITALIST

## 2023-04-26 PROCEDURE — 25000003 PHARM REV CODE 250: Mod: HCNC | Performed by: NURSE PRACTITIONER

## 2023-04-26 PROCEDURE — 97116 GAIT TRAINING THERAPY: CPT | Mod: HCNC,CQ

## 2023-04-26 PROCEDURE — 99900035 HC TECH TIME PER 15 MIN (STAT): Mod: HCNC

## 2023-04-26 PROCEDURE — 36415 COLL VENOUS BLD VENIPUNCTURE: CPT | Mod: HCNC | Performed by: HOSPITALIST

## 2023-04-26 PROCEDURE — 96376 TX/PRO/DX INJ SAME DRUG ADON: CPT

## 2023-04-26 PROCEDURE — 97530 THERAPEUTIC ACTIVITIES: CPT | Mod: HCNC,CO

## 2023-04-26 PROCEDURE — 63600175 PHARM REV CODE 636 W HCPCS: Mod: HCNC | Performed by: NURSE PRACTITIONER

## 2023-04-26 PROCEDURE — 21400001 HC TELEMETRY ROOM: Mod: HCNC

## 2023-04-26 PROCEDURE — 63600175 PHARM REV CODE 636 W HCPCS: Mod: HCNC | Performed by: HOSPITALIST

## 2023-04-26 PROCEDURE — U0005 INFEC AGEN DETEC AMPLI PROBE: HCPCS | Performed by: HOSPITALIST

## 2023-04-26 PROCEDURE — 25000003 PHARM REV CODE 250: Mod: HCNC | Performed by: HOSPITALIST

## 2023-04-26 PROCEDURE — 94799 UNLISTED PULMONARY SVC/PX: CPT | Mod: HCNC

## 2023-04-26 RX ORDER — DOXYCYCLINE HYCLATE 100 MG
100 TABLET ORAL EVERY 12 HOURS
Status: DISCONTINUED | OUTPATIENT
Start: 2023-04-26 | End: 2023-04-27 | Stop reason: HOSPADM

## 2023-04-26 RX ORDER — FUROSEMIDE 40 MG/1
40 TABLET ORAL DAILY
Status: DISCONTINUED | OUTPATIENT
Start: 2023-04-27 | End: 2023-04-27 | Stop reason: HOSPADM

## 2023-04-26 RX ORDER — POTASSIUM CHLORIDE 20 MEQ/1
40 TABLET, EXTENDED RELEASE ORAL ONCE
Status: COMPLETED | OUTPATIENT
Start: 2023-04-26 | End: 2023-04-26

## 2023-04-26 RX ORDER — AMOXICILLIN AND CLAVULANATE POTASSIUM 875; 125 MG/1; MG/1
1 TABLET, FILM COATED ORAL EVERY 12 HOURS
Status: DISCONTINUED | OUTPATIENT
Start: 2023-04-26 | End: 2023-04-27 | Stop reason: HOSPADM

## 2023-04-26 RX ADMIN — FERROUS SULFATE TAB 325 MG (65 MG ELEMENTAL FE) 1 EACH: 325 (65 FE) TAB at 09:04

## 2023-04-26 RX ADMIN — FUROSEMIDE 40 MG: 10 INJECTION, SOLUTION INTRAMUSCULAR; INTRAVENOUS at 09:04

## 2023-04-26 RX ADMIN — MUPIROCIN: 20 OINTMENT TOPICAL at 09:04

## 2023-04-26 RX ADMIN — AMOXICILLIN AND CLAVULANATE POTASSIUM 1 TABLET: 875; 125 TABLET, FILM COATED ORAL at 09:04

## 2023-04-26 RX ADMIN — AMOXICILLIN AND CLAVULANATE POTASSIUM 1 TABLET: 875; 125 TABLET, FILM COATED ORAL at 11:04

## 2023-04-26 RX ADMIN — GUAIFENESIN 600 MG: 600 TABLET, EXTENDED RELEASE ORAL at 09:04

## 2023-04-26 RX ADMIN — FLUCONAZOLE 200 MG: 200 TABLET ORAL at 09:04

## 2023-04-26 RX ADMIN — PRAVASTATIN SODIUM 40 MG: 40 TABLET ORAL at 09:04

## 2023-04-26 RX ADMIN — DOXYCYCLINE HYCLATE 100 MG: 100 TABLET, COATED ORAL at 09:04

## 2023-04-26 RX ADMIN — DOXYCYCLINE HYCLATE 100 MG: 100 TABLET, COATED ORAL at 11:04

## 2023-04-26 RX ADMIN — ASPIRIN 81 MG CHEWABLE TABLET 81 MG: 81 TABLET CHEWABLE at 09:04

## 2023-04-26 RX ADMIN — FLUTICASONE PROPIONATE 100 MCG: 50 SPRAY, METERED NASAL at 08:04

## 2023-04-26 RX ADMIN — POTASSIUM CHLORIDE 40 MEQ: 1500 TABLET, EXTENDED RELEASE ORAL at 09:04

## 2023-04-26 RX ADMIN — ENOXAPARIN SODIUM 40 MG: 40 INJECTION SUBCUTANEOUS at 05:04

## 2023-04-26 RX ADMIN — MICONAZOLE NITRATE: 20 OINTMENT TOPICAL at 09:04

## 2023-04-26 NOTE — PLAN OF CARE
04/26/23 1415   Post-Acute Status   Post-Acute Authorization Placement   Post-Acute Placement Status Pending post-acute provider review/more information requested  (Pt agreeable to [facility]. Auth submitted. Pending auth. Assigned SW to be updated.)

## 2023-04-26 NOTE — PLAN OF CARE
Jeri with [facility] expressed that they have received auth and can admit pt tomorrow. SW communicated with pts daughter Afshan Duenas [phone] to discuss dc planning. Pts daughter made aware of stated above. Pts daughter verbally completed pts choice form. Pts choice form provided to Joslyn to place in chart. Pt and pts daughter agreeable to [facility].     Anticipate dc tomorrow to [facility].     SW will continue to follow pt throughout her transitions of care and assist with any dc needs.    Future Appointments   Date Time Provider Department Center   5/4/2023  3:00 PM KIMBERLY Lopez [facility] AUDIO [facility]   6/15/2023 11:15 AM Lennie Louis DPM [facility] POD [facility] [place]   6/29/2023  8:30 AM Rachael Case MD [facility] [place]   7/12/2023 11:30 AM Brent Chapman Jr., MD [facility] CARDIO [place]        04/26/23 1426   Post-Acute Status   Post-Acute Authorization Placement

## 2023-04-26 NOTE — PT/OT/SLP PROGRESS
Occupational Therapy   Treatment    Name: Jenniffer Jimenez  MRN: [id number]  Admitting Diagnosis:  Acute on chronic diastolic heart failure       Recommendations:     Discharge Recommendations: nursing facility, skilled  Discharge Equipment Recommendations:  to be determined by next level of care  Barriers to discharge:  Decreased caregiver support    Assessment:     Jenniffer Jimenez is a 89 y.o. female with a medical diagnosis of Acute on chronic diastolic heart failure.  Performance deficits affecting function are weakness, impaired endurance, impaired self care skills, impaired functional mobility, gait instability, impaired balance, decreased safety awareness, impaired skin, decreased coordination, decreased upper extremity function, decreased lower extremity function, impaired coordination, impaired cardiopulmonary response to activity.     Rehab Prognosis:  Fair; patient would benefit from acute skilled OT services to address these deficits and reach maximum level of function.       Plan:     Patient to be seen 5 x/week to address the above listed problems via self-care/home management, therapeutic activities, therapeutic exercises  Plan of Care Expires: 05/23/23  Plan of Care Reviewed with: patient    Subjective       Patient/Family Comments/goals: return to PLOF  Pain/Comfort:  Pain Rating 1:  (did not rate)    Objective:     Communicated with: nurseAbby prior to session.  Patient found HOB elevated with bed alarm, telemetry upon OT entry to room.    General Precautions: Standard, fall, hearing impaired    Orthopedic Precautions:N/A  Braces: N/A  Respiratory Status: Room air    Bed Mobility:    Patient completed Scooting/Bridging with minimum assistance     [facility] 6 Click ADL: 16    Treatment & Education:  Patient reports she spent majority of day in bedside chair. Agreeable to G/H tasks (just recently completed toileting on [facility]). Patient able to scoot self up to HOB using rails, B feet and VCs for  technique /c Deirdre (to allow better position upon transition to EOB sitting). OLIVAREZ retrieved necessary items and upon return nsg present and beginning wound care to BLEs (dressing change). Session ended early.     Patient left supine with all lines intact, call button in reach, and nursing present    GOALS:   Multidisciplinary Problems       Occupational Therapy Goals          Problem: Occupational Therapy    Goal Priority Disciplines Outcome Interventions   Occupational Therapy Goal     OT, PT/OT Ongoing, Progressing    Description: Goals to be met by: 05/23/23     Patient will increase functional independence with ADLs by performing:    UE Dressing with Set-up Assistance.  LE Dressing with Minimal Assistance.  Grooming while EOB with Modified [place].  Toileting from bedside commode with Supervision for hygiene and clothing management.   Toilet transfer to bedside commode with Supervision.  Upper extremity exercise program 3x10 reps per handout, with supervision.                         Time Tracking:     OT Date of Treatment: 04/26/23  OT Start Time: 1430  OT Stop Time: 1439  OT Total Time (min): 9 min    Billable Minutes:Therapeutic Activity 9    OT/ROMERO: ROMERO     Number of ROMERO visits since last OT visit: 3    4/26/2023

## 2023-04-26 NOTE — PT/OT/SLP PROGRESS
Physical Therapy Treatment    Patient Name:  Jenniffer Jimenez   MRN:  [id number]    Recommendations:     Discharge Recommendations: nursing facility, skilled  Discharge Equipment Recommendations:  (TBD)  Barriers to discharge:  decreased mobility,strength and endurance    Assessment:     Jenniffer Jimenez is a 89 y.o. female admitted with a medical diagnosis of Acute on chronic diastolic heart failure.  She presents with the following impairments/functional limitations: weakness, impaired endurance, impaired functional mobility, gait instability, impaired balance, decreased lower extremity function, decreased ROM, impaired coordination,pt with good participation and requires assistance with all mobility at this time,pt will benefit from post acute services upon discharge.    Rehab Prognosis: Good; patient would benefit from acute skilled PT services to address these deficits and reach maximum level of function.    Recent Surgery: * No surgery found *      Plan:     During this hospitalization, patient to be seen 5 x/week to address the identified rehab impairments via gait training, therapeutic activities, therapeutic exercises, neuromuscular re-education and progress toward the following goals:    Plan of Care Expires:  05/24/23    Subjective     Chief Complaint: n/a  Patient/Family Comments/goals: pt states she has a rash on her back.  Pain/Comfort:  Pain Rating 1: 0/10      Objective:     Communicated with nsg prior to session.  Patient found supine with bed alarm, pulse ox (continuous), telemetry upon PT entry to room.     General Precautions: Standard, fall, hearing impaired  Orthopedic Precautions: N/A  Braces: N/A  Respiratory Status:  supplemental O2 that was out of pt's nose.     Functional Mobility:  Bed Mobility:     Supine to Sit: supervision  Transfers:     Sit to Stand:  stand by assistance with rolling walker  Bed to Chair: minimum assistance with  rolling walker  using  ambulation  Gait: amb ~28'  with RW and Min A.  Balance: fair standing balance with RW      AM-PAC 6 CLICK MOBILITY  Turning over in bed (including adjusting bedclothes, sheets and blankets)?: 3  Sitting down on and standing up from a chair with arms (e.g., wheelchair, bedside commode, etc.): 3  Moving from lying on back to sitting on the side of the bed?: 3  Moving to and from a bed to a chair (including a wheelchair)?: 3  Need to walk in hospital room?: 3  Climbing 3-5 steps with a railing?: 2  Basic Mobility Total Score: 17       Treatment & Education: le seated ex's X 10 reps inc: ap,laq's and hip flex,pt's O2 sats on rm air at rest and with gait at 97%,nsg notified and O2 doffed.      Patient left up in chair with all lines intact, call button in reach, chair alarm on, and nsg notified..    GOALS: see general POC  Multidisciplinary Problems       Physical Therapy Goals          Problem: Physical Therapy    Goal Priority Disciplines Outcome Goal Variances Interventions   Physical Therapy Goal     PT, PT/OT Ongoing, Progressing     Description: Goals to be met by: 23     Patient will increase functional independence with mobility by performin. Supine to sit with Modified [place]  2. Sit to supine with Modified [place]  3. Sit to stand transfer with Modified [place]  4. Bed to chair transfer with Supervision using Rolling Walker  5. Gait  x >100' feet with Supervision using Rolling Walker.                          Time Tracking:     PT Received On: 23  PT Start Time: 826     PT Stop Time: 0850  PT Total Time (min): 24 min     Billable Minutes: Gait Training 13 and Therapeutic Exercise 11    Treatment Type: Treatment  PT/PTA: PTA     Number of PTA visits since last PT visit: 2023

## 2023-04-26 NOTE — PLAN OF CARE
Problem: Adult Inpatient Plan of Care  Goal: Plan of Care Review  Outcome: Ongoing, Progressing   Chart and plan of care reviewed per VN.

## 2023-04-26 NOTE — PLAN OF CARE
Problem: Physical Therapy  Goal: Physical Therapy Goal  Description: Goals to be met by: 23     Patient will increase functional independence with mobility by performin. Supine to sit with Modified [place]  2. Sit to supine with Modified [place]  3. Sit to stand transfer with Modified [place]  4. Bed to chair transfer with Supervision using Rolling Walker  5. Gait  x >100' feet with Supervision using Rolling Walker.     Outcome: Ongoing, Progressing

## 2023-04-26 NOTE — ASSESSMENT & PLAN NOTE
Patient is identified as having Diastolic (HFpEF) heart failure that is acute on Chronic. CHF is currently uncontrolled. Latest ECHO performed and demonstrates- Results for orders placed during the hospital encounter of 10/07/22    Echo     The left ventricle is normal in size with concentric hypertrophy and normal systolic function.   The estimated ejection fraction is 55%.   Indeterminate left ventricular diastolic function.   With normal right ventricular systolic function.   Severe left atrial enlargement.   Severe right atrial enlargement.   Mild mitral regurgitation.   Mild to moderate tricuspid regurgitation.   There is pulmonary hypertension.   The estimated PA systolic pressure is 56 mmHg.   Elevated central venous pressure (15 mmHg  . Continue Furosemide and monitor clinical status closely. Monitor on telemetry. Patient is off CHF pathway.  Monitor strict Is&Os and daily weights.  Place on fluid restriction of 1.5 L. Continue to stress to patient importance of self efficacy and  on diet for CHF. Last BNP reviewed- and noted below   Recent Labs   Lab 04/21/23  1132   *   .    S/p sepsis IVF. Appears volume overloaded - lasix 20 mg IV x1  4/22 then may continue lasix at home dose PO     Strict I&O    Low na diet   Following cardiology recs       Continue lasix at 40 mg IV daily - patient needs increased diuresis   Incentive spirometry   CXR: The heart is enlarged.  Calcified atheromatous disease affects the aorta.  There is pulmonary vascular congestion with mild edema.  A left-sided pleural effusion is present.  Findings appear slightly worsened as compared to the previous study of 04/21/2023 4/24  Lung sounds improving, however still with some crackles, cough and SOB, improving from yesterday, but not at baseline. On 2L NC. Does not wear home oxygen  Continue diuresis      4/25  -continue diuresis today; some significant wheezing on exam   -weaning oxygen down as  tolerated    4/26  - transition to PO diuretics

## 2023-04-26 NOTE — PROGRESS NOTES
[facility] Medicine  Progress Note    Patient Name: Jenniffer Jimenez  MRN: [id number]  Patient Class: IP- Inpatient   Admission Date: 4/21/2023  Length of Stay: 2 days  Attending Physician: Ari Medrano, *  Primary Care Provider: Rubi Young DO        Subjective:     Principal Problem:Acute on chronic diastolic heart failure        HPI:  89 yo female with a PMH of cataract, [place], CKD, DM II, diabetic polyneuropathy, HTN, PAF, TIA presents to the ED For evaluation of fever noted by home health nurse. RLE appears edematous, erythematous, weeping worse for unknown duration. She also has a rash to her perineal area and back. She notes pain to the RLE. Since in the ED she was noted to be in afib RVR, which is now rate controlled with a dose of IV cardizem. Patient is a poor historian, yelling, hx of psychiatric problem.     I tried to phone POA for further hx- no answer.     Current OP medications as reviewed per recent cardiology note:       acetaminophen (TYLENOL) 500 MG tablet, Take 1,000 mg by mouth 2 (two) times a day., Disp: , Rfl:     aspirin (ECOTRIN) 81 MG EC tablet, Take 1 tablet (81 mg total) by mouth once daily., Disp: 90 tablet, Rfl: 3    diclofenac sodium (VOLTAREN) 1 % Gel, Apply 2 g topically 4 (four) times daily. Use gloves to apply to right hip for 10 days, Disp: 100 g, Rfl: 1    miconazole NITRATE 2 % (MICOTIN) 2 % top powder, Apply topically 2 (two) times daily. To RLE., Disp: , Rfl: 0    nystatin (MYCOSTATIN) powder, Apply topically 2 (two) times daily., Disp: 30 g, Rfl: 1    pravastatin (PRAVACHOL) 40 MG tablet, Take 1 tablet (40 mg total) by mouth once daily., Disp: 90 tablet, Rfl: 3    silver sulfADIAZINE 1% (SILVADENE) 1 % cream, Apply to RLE skin breakdown twice daily, Disp: , Rfl:     triamcinolone acetonide 0.5% (KENALOG) 0.5 % Crea, Apply to legs with dressing changes., Disp: 454 g, Rfl: 0    vit C/E/Zn/coppr/lutein/zeaxan (OCUVITE LUTEIN AND ZEAXANTHIN  ORAL), Take 1 capsule by mouth once daily. Lutien 25 mg, Zeaxanthin 5 mg, Disp: , Rfl:     vitamin E 400 UNIT capsule, Take 400 Units by mouth once daily., Disp: , Rfl:     furosemide (LASIX) 20 MG tablet, Take 1 tablet (20 mg total) by mouth once daily. Hold for weights 150 lb or less, Disp: 30 tablet, Rfl: 11    QUEtiapine (SEROQUEL) 25 MG Tab, Take 1 tablet (25 mg total) by mouth every evening. (Patient not taking: Reported on 4/12/2023), Disp: 30 tablet, Rfl: 11    sodium zirconium cyclosilicate (LOKELMA) 5 gram packet, Take 1 packet (5 g total) by mouth 2 (two) times a day. Mix entire contents of packet(s) into drinking glass containing 3 tablespoons of water; stir well and drink immediately. Add water and repeat until no powder remains to receive entire dose., Disp: 60 packet, Rfl: 3       Overview/Hospital Course:  Admitted for AFib RVR and fever, presumed cellulitis to right lower extremity.  She was treated with IV Cardizem for RVR.  Beta-blockers are listed as allergies on her allergy profile.  She was treated with IV vancomycin and Zosyn.  She was also treated with IV Lasix for acute exacerbation of diastolic heart failure  She is not yet euvolemic.  We will need to continue Lasix at this time due to lung crackles, cough.  Per ID 4/24 low concern for an active infection going on in the wound.     - Stop antibiotics  - Continue to monitor for systemic signs of infection  - Aggressive wound care  - Followup with Dr. Deshpande at clinic here upon discharge     Patient may discharge when euvolemic  Continue physical and occupational therapy, wound care  She will need skilled nursing placement           Interval hx:  Again had episode of shortness of breath overnight, but looks much better today.  Sitting in a chair.  Will do walk test today.  Planning on SNF placement; she has accepting bed for tomorrow so will likely be able to discharge in the morning than.  Discussed all of this with patient and her  daughter at bedside.    Objective:     Vital Signs (Most Recent):  Temp: 98.1 °F (36.7 °C) (04/26/23 1210)  Pulse: 89 (04/26/23 1210)  Resp: 17 (04/26/23 1210)  BP: (!) 149/70 (04/26/23 1210)  SpO2: 97 % (04/26/23 1210)   Vital Signs (24h Range):  Temp:  [97.2 °F (36.2 °C)-98.2 °F (36.8 °C)] 98.1 °F (36.7 °C)  Pulse:  [66-89] 89  Resp:  [17-24] 17  SpO2:  [95 %-100 %] 97 %  BP: (117-149)/(56-70) 149/70     Weight: 70.8 kg (156 lb)  Body mass index is 25.96 kg/m².    Physical Exam  Constitutional:       Appearance: She is ill-appearing.   HENT:      Head: Normocephalic and atraumatic.      Mouth/Throat:      Pharynx: Oropharynx is clear.   Eyes:      Conjunctiva/sclera: Conjunctivae normal.   Cardiovascular:      Rate and Rhythm: Normal rate. Rhythm irregular.      Pulses: Normal pulses.      Heart sounds: Normal heart sounds.   Pulmonary:      Effort: Pulmonary effort is normal.      Comments: Coarse, but improving   Abdominal:      General: Abdomen is flat. Bowel sounds are normal.      Palpations: Abdomen is soft.   Musculoskeletal:         General: Swelling and tenderness present.      Cervical back: Normal range of motion and neck supple.   Skin:     General: Skin is warm and dry.      Findings: Rash present.      Comments: RLE edematous, erythematous, weeping, tender, warm  Palpable pulse     LLE with skin tear and rash, does not appear infected     Noted yeast type rash to perineal area while she is sitting on BS commode     Erythematous rash to back    Neurological:      General: No focal deficit present.      Mental Status: She is alert.   Psychiatric:         Mood and Affect: Mood is not anxious.         Behavior: Behavior is not agitated.           Significant Labs: All pertinent labs within the past 24 hours have been reviewed.    Significant Imaging: I have reviewed all pertinent imaging results/findings within the past 24 hours.  Review of Systems      Assessment/Plan:      * Acute on chronic diastolic  heart failure    Patient is identified as having Diastolic (HFpEF) heart failure that is acute on Chronic. CHF is currently uncontrolled. Latest ECHO performed and demonstrates- Results for orders placed during the hospital encounter of 10/07/22    Echo     The left ventricle is normal in size with concentric hypertrophy and normal systolic function.   The estimated ejection fraction is 55%.   Indeterminate left ventricular diastolic function.   With normal right ventricular systolic function.   Severe left atrial enlargement.   Severe right atrial enlargement.   Mild mitral regurgitation.   Mild to moderate tricuspid regurgitation.   There is pulmonary hypertension.   The estimated PA systolic pressure is 56 mmHg.   Elevated central venous pressure (15 mmHg  . Continue Furosemide and monitor clinical status closely. Monitor on telemetry. Patient is off CHF pathway.  Monitor strict Is&Os and daily weights.  Place on fluid restriction of 1.5 L. Continue to stress to patient importance of self efficacy and  on diet for CHF. Last BNP reviewed- and noted below   Recent Labs   Lab 04/21/23  1132   *   .    S/p sepsis IVF. Appears volume overloaded - lasix 20 mg IV x1  4/22 then may continue lasix at home dose PO     Strict I&O    Low na diet   Following cardiology recs       Continue lasix at 40 mg IV daily - patient needs increased diuresis   Incentive spirometry   CXR: The heart is enlarged.  Calcified atheromatous disease affects the aorta.  There is pulmonary vascular congestion with mild edema.  A left-sided pleural effusion is present.  Findings appear slightly worsened as compared to the previous study of 04/21/2023 4/24  Lung sounds improving, however still with some crackles, cough and SOB, improving from yesterday, but not at baseline. On 2L NC. Does not wear home oxygen  Continue diuresis      4/25  -continue diuresis today; some significant wheezing on exam   -weaning oxygen down  as tolerated    4/26  - transition to PO diuretics    Community acquired pneumonia of left lower lobe of lung  - consolidation left base  - resume antibiotics for 7d total course; had already received vanc/zosyn at admission for LE cellulitis      Microcytic anemia  hgb 9.8--> 7.3--> 8.3  Anemia panel   Stool for OCB   Type and screen   UA without blood   Close monitoring   Transfuse if Hgb < 7 or symptomatic     Lab Results   Component Value Date    IRON <10 (L) 04/22/2023    TRANSFERRIN 165 (L) 04/22/2023    TIBC 244 (L) 04/22/2023    FESATURATED Unable to calculate 04/22/2023      Add iron PO     Presence of Watchman left atrial appendage closure device  -noted   -no need for anticoagulation      Venous insufficiency of both lower extremities  Palpable R dorsalis pedal pulse   Will US RLE arterial for further vascular assessment   No evidence of high-grade stenosis of the right lower extremity arteries.  2. Monophasic waveforms in the popliteal artery and calf arteries, likely related to vessel hardening from atherosclerotic disease.    Check venous doppler to RO DVT No evidence of deep venous thrombosis in the right lower extremity.     Right lower extremity nonspecific subcutaneous edema, with right groin few prominent but nonenlarged lymph nodes.    Prediabetes    Lab Results   Component Value Date    HGBA1C 6.0 (H) 01/26/2023     Low CSI   Consistent carb diet     History of TIA (transient ischemic attack)  Continue asa, statin     Stage 3b chronic kidney disease  Cr 1.1  Baseline     Chronic a-fib  Patient with Persistent (7 days or more) atrial fibrillation which is controlled currently with Calcium Channel Blocker. Patient is currently in atrial fibrillation.[id number] Score: 4. HASBLED Score:  Anticoagulation not on anticoagulation due to watchman device, on asa only     Normally rate controlled   afib rvr in the ED   IV cardizem given, BB on allergy list   6 beat run of v-tach overnight 4/22, pt  "asymptomatic    Cardiology consulted currently rate controlled and BP stable  -Can use CCB (PO dilt) if rate control needed in light of BB "allergy"    Debility  -PT/OT recommend SNF      Primary hypertension  Hold medications for now; can resume as becomes hypertensive    Pure hypercholesterolemia  -continue statin       VTE Risk Mitigation (From admission, onward)         Ordered     enoxaparin injection 40 mg  Daily         04/25/23 0946     IP VTE HIGH RISK PATIENT  Once         04/21/23 1507     Place sequential compression device  Until discontinued         04/21/23 1507                Discharge Planning   GILES:      Code Status: Full Code   Is the patient medically ready for discharge?:     Reason for patient still in hospital (select all that apply): Patient trending condition and Pending disposition  Discharge Plan A: Independent living facility                  Ari Medrano MD  Department of Hospital Medicine   [facility]  "

## 2023-04-26 NOTE — NURSING
PROACTIVE ROUNDING NOTE       Time of Visit: 2100    Admit Date: 2023  LOS: 1  Code Status: Full Code   Date of Visit: 2023  : 1932  Age: 89 y.o.  Sex: female  Race: White  Bed: K521/K521 A:   MRN: [id number]  Was the patient discharged from an ICU this admission? No   Was the patient discharged from a PACU within last 24 hours? No   Did the patient receive conscious sedation/general anesthesia in last 24 hours? No   Was the patient in the ED within the past 24 hours? No   Was the patient on NIPPV within the past 24 hours? No   Attending Physician: Ari Medrano, *  Primary Service: Hospitalist   Time spent at the bedside: 15 -30 min    SITUATION    Notified by charge RN via phone call  Reason for alert: Alerted by charge nurse for worsening lung sounds. Pt not in distress.    Diagnosis: Acute on chronic diastolic heart failure   has a past medical history of Amblyopia of left eye, Arthritis, Atherosclerosis of aorta, Cataract, Central retinal vein occlusion of left eye, CKD (chronic kidney disease) stage 3, GFR 30-59 ml/min, Diabetes mellitus, type 2, Diabetic polyneuropathy, Essential (primary) hypertension, Exotropia of both eyes, Hearing loss, History of resection of small bowel, Hypertensive retinopathy of both eyes, Hypoglycemia, Macular degeneration, OA (osteoarthritis) of shoulder, Osteoporosis, Paroxysmal atrial fibrillation, Posterior vitreous detachment of both eyes, Psychiatric problem, Rhinitis, and TIA (transient ischemic attack).    Last Vitals:  Temp: 98 °F (36.7 °C) (1932)  Pulse: 83 (2043)  Resp: 24 (2043)  BP: 117/57 (1932)  SpO2: 99 % (2034)    24 Hour Vitals Range:  Temp:  [97.4 °F (36.3 °C)-98 °F (36.7 °C)]   Pulse:  [63-84]   Resp:  [18-24]   BP: (117-134)/(57-61)   SpO2:  [96 %-100 %]     Clinical Issues: Respiratory    ASSESSMENT/INTERVENTIONS    Chart review performed. Last X-ray noted .     RECOMMENDATIONS  Repeat X-ray. Incentive  tomy     Discussed plan of care with Charge RNSelina    PROVIDER ESCALATION    Physician escalation: Yes    Orders received and case discussed with  SEAN Haney. Stated she would come assess pt.    Disposition:Remain in room 521    FOLLOW UP    Call back the Rapid Response NurseJennifer at [phone] for additional questions or concerns.

## 2023-04-26 NOTE — ASSESSMENT & PLAN NOTE
"Patient with Persistent (7 days or more) atrial fibrillation which is controlled currently with Calcium Channel Blocker. Patient is currently in atrial fibrillation.[id number] Score: 4. HASBLED Score:  Anticoagulation not on anticoagulation due to watchman device, on asa only     Normally rate controlled   afib rvr in the ED   IV cardizem given, BB on allergy list   6 beat run of v-tach overnight 4/22, pt asymptomatic    Cardiology consulted currently rate controlled and BP stable  -Can use CCB (PO dilt) if rate control needed in light of BB "allergy"  "

## 2023-04-26 NOTE — SUBJECTIVE & OBJECTIVE
Interval hx:  Again had episode of shortness of breath overnight, but looks much better today.  Sitting in a chair.  Will do walk test today.  Planning on SNF placement; she has accepting bed for tomorrow so will likely be able to discharge in the morning than.  Discussed all of this with patient and her daughter at bedside.    Objective:     Vital Signs (Most Recent):  Temp: 98.1 °F (36.7 °C) (04/26/23 1210)  Pulse: 89 (04/26/23 1210)  Resp: 17 (04/26/23 1210)  BP: (!) 149/70 (04/26/23 1210)  SpO2: 97 % (04/26/23 1210)   Vital Signs (24h Range):  Temp:  [97.2 °F (36.2 °C)-98.2 °F (36.8 °C)] 98.1 °F (36.7 °C)  Pulse:  [66-89] 89  Resp:  [17-24] 17  SpO2:  [95 %-100 %] 97 %  BP: (117-149)/(56-70) 149/70     Weight: 70.8 kg (156 lb)  Body mass index is 25.96 kg/m².    Physical Exam  Constitutional:       Appearance: She is ill-appearing.   HENT:      Head: Normocephalic and atraumatic.      Mouth/Throat:      Pharynx: Oropharynx is clear.   Eyes:      Conjunctiva/sclera: Conjunctivae normal.   Cardiovascular:      Rate and Rhythm: Normal rate. Rhythm irregular.      Pulses: Normal pulses.      Heart sounds: Normal heart sounds.   Pulmonary:      Effort: Pulmonary effort is normal.      Comments: Coarse, but improving   Abdominal:      General: Abdomen is flat. Bowel sounds are normal.      Palpations: Abdomen is soft.   Musculoskeletal:         General: Swelling and tenderness present.      Cervical back: Normal range of motion and neck supple.   Skin:     General: Skin is warm and dry.      Findings: Rash present.      Comments: RLE edematous, erythematous, weeping, tender, warm  Palpable pulse     LLE with skin tear and rash, does not appear infected     Noted yeast type rash to perineal area while she is sitting on BS commode     Erythematous rash to back    Neurological:      General: No focal deficit present.      Mental Status: She is alert.   Psychiatric:         Mood and Affect: Mood is not anxious.          Behavior: Behavior is not agitated.           Significant Labs: All pertinent labs within the past 24 hours have been reviewed.    Significant Imaging: I have reviewed all pertinent imaging results/findings within the past 24 hours.  Review of Systems

## 2023-04-27 ENCOUNTER — PATIENT OUTREACH (OUTPATIENT)
Dept: ADMINISTRATIVE | Facility: OTHER | Age: 88
End: 2023-04-27
Payer: MEDICARE

## 2023-04-27 ENCOUNTER — HOSPITAL ENCOUNTER (INPATIENT)
Facility: HOSPITAL | Age: 88
LOS: 15 days | Discharge: HOME HEALTH | DRG: 291 | End: 2023-05-12
Attending: HOSPITALIST | Admitting: HOSPITALIST
Payer: MEDICARE

## 2023-04-27 VITALS
TEMPERATURE: 99 F | HEIGHT: 65 IN | HEART RATE: 67 BPM | RESPIRATION RATE: 20 BRPM | OXYGEN SATURATION: 100 % | DIASTOLIC BLOOD PRESSURE: 63 MMHG | BODY MASS INDEX: 25.99 KG/M2 | SYSTOLIC BLOOD PRESSURE: 129 MMHG | WEIGHT: 156 LBS

## 2023-04-27 DIAGNOSIS — L27.0 GENERALIZED SKIN ERUPTION DUE TO MEDICATION TAKEN INTERNALLY: Primary | ICD-10-CM

## 2023-04-27 DIAGNOSIS — L30.4 INTERTRIGO: ICD-10-CM

## 2023-04-27 DIAGNOSIS — I50.9 CHF (CONGESTIVE HEART FAILURE): ICD-10-CM

## 2023-04-27 DIAGNOSIS — S81.801D MULTIPLE OPEN WOUNDS OF RIGHT LOWER EXTREMITY, SUBSEQUENT ENCOUNTER: ICD-10-CM

## 2023-04-27 LAB
ANION GAP SERPL CALC-SCNC: 11 MMOL/L (ref 8–16)
BASOPHILS # BLD AUTO: 0.02 K/UL (ref 0–0.2)
BASOPHILS NFR BLD: 0.5 % (ref 0–1.9)
BUN SERPL-MCNC: 18 MG/DL (ref 10–30)
CALCIUM SERPL-MCNC: 8.3 MG/DL (ref 8.7–10.5)
CHLORIDE SERPL-SCNC: 103 MMOL/L (ref 95–110)
CO2 SERPL-SCNC: 25 MMOL/L (ref 23–29)
CREAT SERPL-MCNC: 0.9 MG/DL (ref 0.5–1.4)
DIFFERENTIAL METHOD: ABNORMAL
EOSINOPHIL # BLD AUTO: 0.1 K/UL (ref 0–0.5)
EOSINOPHIL NFR BLD: 2.1 % (ref 0–8)
ERYTHROCYTE [DISTWIDTH] IN BLOOD BY AUTOMATED COUNT: 17.8 % (ref 11.5–14.5)
EST. GFR  (NO RACE VARIABLE): >60 ML/MIN/1.73 M^2
GLUCOSE SERPL-MCNC: 100 MG/DL (ref 70–110)
HCT VFR BLD AUTO: 24.6 % (ref 37–48.5)
HGB BLD-MCNC: 7.9 G/DL (ref 12–16)
IMM GRANULOCYTES # BLD AUTO: 0.03 K/UL (ref 0–0.04)
IMM GRANULOCYTES NFR BLD AUTO: 0.8 % (ref 0–0.5)
LYMPHOCYTES # BLD AUTO: 0.8 K/UL (ref 1–4.8)
LYMPHOCYTES NFR BLD: 19.4 % (ref 18–48)
MCH RBC QN AUTO: 27.2 PG (ref 27–31)
MCHC RBC AUTO-ENTMCNC: 32.1 G/DL (ref 32–36)
MCV RBC AUTO: 85 FL (ref 82–98)
MONOCYTES # BLD AUTO: 0.4 K/UL (ref 0.3–1)
MONOCYTES NFR BLD: 9.6 % (ref 4–15)
NEUTROPHILS # BLD AUTO: 2.6 K/UL (ref 1.8–7.7)
NEUTROPHILS NFR BLD: 67.6 % (ref 38–73)
NRBC BLD-RTO: 0 /100 WBC
PLATELET # BLD AUTO: 199 K/UL (ref 150–450)
PMV BLD AUTO: 9.6 FL (ref 9.2–12.9)
POCT GLUCOSE: 112 MG/DL (ref 70–110)
POCT GLUCOSE: 161 MG/DL (ref 70–110)
POCT GLUCOSE: 86 MG/DL (ref 70–110)
POTASSIUM SERPL-SCNC: 3.7 MMOL/L (ref 3.5–5.1)
RBC # BLD AUTO: 2.9 M/UL (ref 4–5.4)
SARS-COV-2 RNA RESP QL NAA+PROBE: NOT DETECTED
SODIUM SERPL-SCNC: 139 MMOL/L (ref 136–145)
WBC # BLD AUTO: 3.86 K/UL (ref 3.9–12.7)

## 2023-04-27 PROCEDURE — 11000004 HC SNF PRIVATE: Mod: HCNC

## 2023-04-27 PROCEDURE — 85025 COMPLETE CBC W/AUTO DIFF WBC: CPT | Mod: HCNC | Performed by: HOSPITALIST

## 2023-04-27 PROCEDURE — 25000003 PHARM REV CODE 250: Mod: HCNC | Performed by: NURSE PRACTITIONER

## 2023-04-27 PROCEDURE — 80048 BASIC METABOLIC PNL TOTAL CA: CPT | Mod: HCNC | Performed by: HOSPITALIST

## 2023-04-27 PROCEDURE — 94761 N-INVAS EAR/PLS OXIMETRY MLT: CPT | Mod: HCNC

## 2023-04-27 PROCEDURE — 25000003 PHARM REV CODE 250: Mod: HCNC | Performed by: HOSPITALIST

## 2023-04-27 PROCEDURE — 97535 SELF CARE MNGMENT TRAINING: CPT | Mod: HCNC

## 2023-04-27 PROCEDURE — 25000242 PHARM REV CODE 250 ALT 637 W/ HCPCS: Mod: HCNC | Performed by: HOSPITALIST

## 2023-04-27 PROCEDURE — 94799 UNLISTED PULMONARY SVC/PX: CPT | Mod: HCNC

## 2023-04-27 PROCEDURE — 99900035 HC TECH TIME PER 15 MIN (STAT): Mod: HCNC

## 2023-04-27 PROCEDURE — 27000221 HC OXYGEN, UP TO 24 HOURS: Mod: HCNC

## 2023-04-27 PROCEDURE — 94640 AIRWAY INHALATION TREATMENT: CPT | Mod: HCNC

## 2023-04-27 PROCEDURE — 36415 COLL VENOUS BLD VENIPUNCTURE: CPT | Mod: HCNC | Performed by: HOSPITALIST

## 2023-04-27 RX ORDER — DICLOFENAC SODIUM 10 MG/G
2 GEL TOPICAL 4 TIMES DAILY
Status: DISCONTINUED | OUTPATIENT
Start: 2023-04-27 | End: 2023-05-12 | Stop reason: HOSPADM

## 2023-04-27 RX ORDER — PRAVASTATIN SODIUM 20 MG/1
40 TABLET ORAL DAILY
Status: DISCONTINUED | OUTPATIENT
Start: 2023-04-28 | End: 2023-05-12 | Stop reason: HOSPADM

## 2023-04-27 RX ORDER — FLUTICASONE PROPIONATE 50 MCG
2 SPRAY, SUSPENSION (ML) NASAL DAILY
Status: DISCONTINUED | OUTPATIENT
Start: 2023-04-28 | End: 2023-05-12 | Stop reason: HOSPADM

## 2023-04-27 RX ORDER — DOXYCYCLINE HYCLATE 100 MG
100 TABLET ORAL EVERY 12 HOURS
Start: 2023-04-27 | End: 2023-08-07 | Stop reason: ALTCHOICE

## 2023-04-27 RX ORDER — GUAIFENESIN/DEXTROMETHORPHAN 100-10MG/5
5 SYRUP ORAL EVERY 4 HOURS PRN
Status: DISCONTINUED | OUTPATIENT
Start: 2023-04-27 | End: 2023-05-01

## 2023-04-27 RX ORDER — AMOXICILLIN AND CLAVULANATE POTASSIUM 875; 125 MG/1; MG/1
1 TABLET, FILM COATED ORAL EVERY 12 HOURS
Status: COMPLETED | OUTPATIENT
Start: 2023-04-27 | End: 2023-05-01

## 2023-04-27 RX ORDER — ACETAMINOPHEN 325 MG/1
650 TABLET ORAL EVERY 6 HOURS PRN
Status: DISCONTINUED | OUTPATIENT
Start: 2023-04-27 | End: 2023-05-12 | Stop reason: HOSPADM

## 2023-04-27 RX ORDER — ACETAMINOPHEN 500 MG
1000 TABLET ORAL 2 TIMES DAILY
Status: DISCONTINUED | OUTPATIENT
Start: 2023-04-27 | End: 2023-05-12 | Stop reason: HOSPADM

## 2023-04-27 RX ORDER — AMOXICILLIN AND CLAVULANATE POTASSIUM 875; 125 MG/1; MG/1
1 TABLET, FILM COATED ORAL EVERY 12 HOURS
Start: 2023-04-27 | End: 2023-08-07 | Stop reason: ALTCHOICE

## 2023-04-27 RX ORDER — SILVER SULFADIAZINE 10 G/1000G
CREAM TOPICAL 2 TIMES DAILY
Status: DISCONTINUED | OUTPATIENT
Start: 2023-04-27 | End: 2023-05-12 | Stop reason: HOSPADM

## 2023-04-27 RX ORDER — FUROSEMIDE 40 MG/1
40 TABLET ORAL DAILY
Qty: 30 TABLET | Refills: 11
Start: 2023-04-27 | End: 2023-05-25

## 2023-04-27 RX ORDER — FUROSEMIDE 40 MG/1
40 TABLET ORAL DAILY
Status: DISCONTINUED | OUTPATIENT
Start: 2023-04-28 | End: 2023-05-04

## 2023-04-27 RX ORDER — LANOLIN ALCOHOL/MO/W.PET/CERES
1 CREAM (GRAM) TOPICAL DAILY
Status: DISCONTINUED | OUTPATIENT
Start: 2023-04-28 | End: 2023-05-12 | Stop reason: HOSPADM

## 2023-04-27 RX ORDER — FLUCONAZOLE 200 MG/1
200 TABLET ORAL DAILY
Status: COMPLETED | OUTPATIENT
Start: 2023-04-28 | End: 2023-05-06

## 2023-04-27 RX ORDER — DOXYCYCLINE HYCLATE 100 MG
100 TABLET ORAL EVERY 12 HOURS
Status: COMPLETED | OUTPATIENT
Start: 2023-04-27 | End: 2023-05-01

## 2023-04-27 RX ORDER — FLUTICASONE PROPIONATE 50 MCG
2 SPRAY, SUSPENSION (ML) NASAL DAILY
Refills: 0 | Status: ON HOLD
Start: 2023-04-27 | End: 2024-01-29

## 2023-04-27 RX ORDER — TALC
6 POWDER (GRAM) TOPICAL NIGHTLY PRN
Status: DISCONTINUED | OUTPATIENT
Start: 2023-04-27 | End: 2023-05-12 | Stop reason: HOSPADM

## 2023-04-27 RX ORDER — GUAIFENESIN/DEXTROMETHORPHAN 100-10MG/5
5 SYRUP ORAL EVERY 4 HOURS PRN
Refills: 0
Start: 2023-04-27 | End: 2023-05-07

## 2023-04-27 RX ORDER — CALCIUM CARBONATE 200(500)MG
500 TABLET,CHEWABLE ORAL 2 TIMES DAILY PRN
Status: DISCONTINUED | OUTPATIENT
Start: 2023-04-27 | End: 2023-05-12 | Stop reason: HOSPADM

## 2023-04-27 RX ORDER — IPRATROPIUM BROMIDE AND ALBUTEROL SULFATE 2.5; .5 MG/3ML; MG/3ML
3 SOLUTION RESPIRATORY (INHALATION) EVERY 6 HOURS PRN
Qty: 75 ML | Refills: 0
Start: 2023-04-27 | End: 2024-04-26

## 2023-04-27 RX ORDER — FLUCONAZOLE 200 MG/1
200 TABLET ORAL DAILY
Qty: 30 TABLET | Refills: 0
Start: 2023-04-27 | End: 2023-05-27

## 2023-04-27 RX ORDER — AMOXICILLIN 250 MG
1 CAPSULE ORAL 2 TIMES DAILY
Status: DISCONTINUED | OUTPATIENT
Start: 2023-04-27 | End: 2023-05-12 | Stop reason: HOSPADM

## 2023-04-27 RX ORDER — FERROUS SULFATE 325(65) MG
325 TABLET, DELAYED RELEASE (ENTERIC COATED) ORAL DAILY
Start: 2023-04-27

## 2023-04-27 RX ORDER — ASPIRIN 81 MG/1
81 TABLET ORAL DAILY
Status: DISCONTINUED | OUTPATIENT
Start: 2023-04-28 | End: 2023-05-12 | Stop reason: HOSPADM

## 2023-04-27 RX ORDER — IPRATROPIUM BROMIDE AND ALBUTEROL SULFATE 2.5; .5 MG/3ML; MG/3ML
3 SOLUTION RESPIRATORY (INHALATION) EVERY 6 HOURS PRN
Status: DISCONTINUED | OUTPATIENT
Start: 2023-04-27 | End: 2023-05-12 | Stop reason: HOSPADM

## 2023-04-27 RX ORDER — VITAMIN E 268 MG
400 CAPSULE ORAL DAILY
Status: DISCONTINUED | OUTPATIENT
Start: 2023-04-28 | End: 2023-05-12 | Stop reason: HOSPADM

## 2023-04-27 RX ADMIN — PRAVASTATIN SODIUM 40 MG: 40 TABLET ORAL at 08:04

## 2023-04-27 RX ADMIN — MUPIROCIN: 20 OINTMENT TOPICAL at 08:04

## 2023-04-27 RX ADMIN — DICLOFENAC SODIUM 2 G: 10 GEL TOPICAL at 06:04

## 2023-04-27 RX ADMIN — FLUCONAZOLE 200 MG: 200 TABLET ORAL at 08:04

## 2023-04-27 RX ADMIN — DOXYCYCLINE HYCLATE 100 MG: 100 TABLET, COATED ORAL at 08:04

## 2023-04-27 RX ADMIN — GUAIFENESIN AND DEXTROMETHORPHAN 5 ML: 100; 10 SYRUP ORAL at 07:04

## 2023-04-27 RX ADMIN — AMOXICILLIN AND CLAVULANATE POTASSIUM 1 TABLET: 875; 125 TABLET, FILM COATED ORAL at 08:04

## 2023-04-27 RX ADMIN — FUROSEMIDE 40 MG: 40 TABLET ORAL at 08:04

## 2023-04-27 RX ADMIN — DICLOFENAC SODIUM 2 G: 10 GEL TOPICAL at 09:04

## 2023-04-27 RX ADMIN — FERROUS SULFATE TAB 325 MG (65 MG ELEMENTAL FE) 1 EACH: 325 (65 FE) TAB at 08:04

## 2023-04-27 RX ADMIN — ASPIRIN 81 MG CHEWABLE TABLET 81 MG: 81 TABLET CHEWABLE at 08:04

## 2023-04-27 RX ADMIN — GUAIFENESIN 600 MG: 600 TABLET, EXTENDED RELEASE ORAL at 08:04

## 2023-04-27 RX ADMIN — MICONAZOLE NITRATE: 20 OINTMENT TOPICAL at 09:04

## 2023-04-27 RX ADMIN — FLUTICASONE PROPIONATE 100 MCG: 50 SPRAY, METERED NASAL at 08:04

## 2023-04-27 RX ADMIN — IPRATROPIUM BROMIDE AND ALBUTEROL SULFATE 3 ML: 2.5; .5 SOLUTION RESPIRATORY (INHALATION) at 06:04

## 2023-04-27 RX ADMIN — ACETAMINOPHEN 1000 MG: 500 TABLET ORAL at 08:04

## 2023-04-27 NOTE — PLAN OF CARE
[facility]    FACILITY TRANSFER ORDERS      Patient Name: Jenniffer Jimenez  YOB: 1932    PCP: Rubi Young DO   PCP Address: [address] [facility] / [place] [address]  PCP Phone Number: [phone]  PCP Fax: [phone]    Encounter Date: 04/27/2023    Admit to: SNF    Vital Signs:  Routine    Diagnoses:   Active Hospital Problems    Diagnosis  POA    *Acute on chronic diastolic heart failure [I50.33]  Yes     TTE (10/7/2022):    The left ventricle is normal in size with concentric remodeling and normal systolic function.  The estimated ejection fraction is 55-60%.  Grade III left ventricular diastolic dysfunction.  Mild mitral regurgitation.  Normal right ventricular size with mildly reduced right ventricular systolic function.  Severe tricuspid regurgitation.  The estimated PA systolic pressure is 52 mmHg. PASP may be under-estimated due to TR severity.  Elevated central venous pressure (15 mmHg).  There is pulmonary hypertension.  Severe left atrial enlargement.           Community acquired pneumonia of left lower lobe of lung [J18.9]  Yes    Heart failure with preserved ejection fraction [I50.30]  Yes    Microcytic anemia [D50.9]  Yes    Presence of Watchman left atrial appendage closure device [Z95.818]  Yes     Chronic    Venous insufficiency of both lower extremities [I87.2]  Yes    Prediabetes [R73.03]  Yes    Stage 3b chronic kidney disease [N18.32]  Yes    History of TIA (transient ischemic attack) [Z86.73]  Not Applicable    Chronic a-fib [I48.20]  Yes    Debility [R53.81]  Yes    Primary hypertension [I10]  Yes     Chronic    Pure hypercholesterolemia [E78.00]  Yes     Chronic      Resolved Hospital Problems    Diagnosis Date Resolved POA    Constipation [K59.00] 04/26/2023 Yes    Sepsis [A41.9] 04/25/2023 Yes       Allergies:  Review of patient's allergies indicates:   Allergen Reactions    Opioids - morphine analogues Other (See Comments)     Bowel issues;  "bowel obstruction    Tizanidine Other (See Comments)     "Lips were numb,  Almost passed out."    Tramadol Hallucinations    Beta-blockers (beta-adrenergic blocking agts) Other (See Comments)     Can not go on beta blockers for long period of time - due to taking allergy injections    Morphine     Opioids-meperidine and related     Ciprofloxacin Rash       Diet: cardiac diet    Activities: Activity as tolerated    Goals of Care Treatment Preferences:  Code Status: Full Code      Nursing: per facility protocol     Labs: per facililty protocol    CONSULTS:    Physical Therapy to evaluate and treat. , Occupational Therapy to evaluate and treat., and  to evaluate for community resources/long-range planning.    MISCELLANEOUS CARE:  Routine Skin for Bedridden Patients: Apply moisture barrier cream to all skin folds and wet areas in perineal area daily and after baths and all bowel movements.    WOUND CARE ORDERS  Yes:    Nursing to cleanse BLE wound with Vashe wound  cleanser and apply moisturizer to dry skin- xeroform dressing to redness- cover with abd pads and secure with Kerlix.  Change daily.    Medications: Review discharge medications with patient and family and provide education.      Current Discharge Medication List        START taking these medications    Details   albuterol-ipratropium (DUO-NEB) 2.5 mg-0.5 mg/3 mL nebulizer solution Take 3 mLs by nebulization every 6 (six) hours as needed for Wheezing or Shortness of Breath. Rescue  Qty: 75 mL, Refills: 0      amoxicillin-clavulanate 875-125mg (AUGMENTIN) 875-125 mg per tablet Take 1 tablet by mouth every 12 (twelve) hours. End 4/30/23      dextromethorphan-guaiFENesin  mg/5 ml (ROBITUSSIN-DM)  mg/5 mL liquid Take 5 mLs by mouth every 4 (four) hours as needed (cough).  Refills: 0      doxycycline (VIBRA-TABS) 100 MG tablet Take 1 tablet (100 mg total) by mouth every 12 (twelve) hours. End 4/30/23      ferrous sulfate 325 (65 FE) MG EC " tablet Take 1 tablet (325 mg total) by mouth once daily.      fluconazole (DIFLUCAN) 200 MG Tab Take 1 tablet (200 mg total) by mouth once daily. End 5/5/23  Qty: 30 tablet, Refills: 0      fluticasone propionate (FLONASE) 50 mcg/actuation nasal spray 2 sprays (100 mcg total) by Each Nostril route once daily.  Refills: 0      miconazole nitrate 2% (MICOTIN) 2 % Oint Apply topically 2 (two) times daily. Coloplast Clear Antifungal ointment- (green top)- nursing to apply to perineum, inner thighs, pannus, and buttocks BID  Refills: 0           CONTINUE these medications which have CHANGED    Details   furosemide (LASIX) 40 MG tablet Take 1 tablet (40 mg total) by mouth once daily.  Qty: 30 tablet, Refills: 11           CONTINUE these medications which have NOT CHANGED    Details   acetaminophen (TYLENOL) 500 MG tablet Take 1,000 mg by mouth 2 (two) times a day.      aspirin (ECOTRIN) 81 MG EC tablet Take 1 tablet (81 mg total) by mouth once daily.  Qty: 90 tablet, Refills: 3    Associated Diagnoses: Chronic atrial fibrillation      diclofenac sodium (VOLTAREN) 1 % Gel Apply 2 g topically 4 (four) times daily. Apply to right hip      pravastatin (PRAVACHOL) 40 MG tablet Take 1 tablet (40 mg total) by mouth once daily.  Qty: 90 tablet, Refills: 3    Associated Diagnoses: Pure hypercholesterolemia      propylene glycol (SYSTANE COMPLETE OPHT) Apply to eye.      silver sulfADIAZINE 1% (SILVADENE) 1 % cream Apply to RLE skin breakdown twice daily      vitamin E 400 UNIT capsule Take 400 Units by mouth once daily.           STOP taking these medications       nystatin (MYCOSTATIN) powder Comments:   Reason for Stopping:         QUEtiapine (SEROQUEL) 25 MG Tab Comments:   Reason for Stopping:         sodium zirconium cyclosilicate (LOKELMA) 5 gram packet Comments:   Reason for Stopping:                  Immunizations Administered as of 4/27/2023       Name Date Dose VIS Date Route Exp Date    COVID-19, MRNA, LN-S, PF (Pfizer)  (Purple Cap) 9/17/2021 0.3 mL -- Intramuscular --    Site: Left deltoid     : Pf[employer] Inc     Lot: [id number]     COVID-19, MRNA, LN-S, PF (Pfizer) (Purple Cap) 2/5/2021 10:50 AM 0.3 mL 12/12/2020 Intramuscular 5/31/2021    Site: Right deltoid     Given By: Rani Rutherford LPN     : Pf[employer] Inc     Lot: [id number]     COVID-19, MRNA, LN-S, PF (Pfizer) (Purple Cap) 1/14/2021  2:30 PM 0.3 mL 12/12/2020 Intramuscular 4/30/2021    Site: Right arm     Given By: Chela Lopez LPN     : Pf[employer] Inc     Lot: [id number]             This patient has had both covid vaccinations    Some patients may experience side effects after vaccination.  These may include fever, headache, muscle or joint aches.  Most symptoms resolve with 24-48 hours and do not require urgent medical evaluation unless they persist for more than 72 hours or symptoms are concerning for an unrelated medical condition.          _________________________________  Ari Medrano MD  04/27/2023

## 2023-04-27 NOTE — PHYSICIAN QUERY
PT Name: Jenniffer Jimenez  MR #: [id number]     DOCUMENTATION CLARIFICATION     CDS/: Corinne Burk RN CDIS             Contact information: [email or web address]    This form is a permanent document in the medical record.     Query Date: April 27, 2023    By submitting this query, we are merely seeking further clarification of documentation.  Please utilize your independent clinical judgment when addressing the question(s) below.  The Medical Record contains the following:  Indicators Supporting Clinical Findings Location in Medical Record   x HR         RR          BP        Temp Vital Signs (24h Range):  Temp:  [98.1 °F (36.7 °C)-101.8 °F (38.8 °C)] 98.1 °F (36.7 °C)  Pulse:  [102-111] 105  Resp:  [22-27] 27  SpO2:  [92 %-100 %] 96 %  BP: ()/(53-69) 98/53    Vital Signs (24h Range):  Temp:  [97.6 °F (36.4 °C)-100.5 °F (38.1 °C)] 97.6 °F (36.4 °C)  Pulse:  [] 74  Resp:  [18-27] 20  SpO2:  [96 %-100 %] 100 %  BP: ()/(45-64) 118/58 H&P 4/21               HM note 4/22    x Lactic Acid          Procalcitonin Lab 04/21/23  1132 04/21/23  1457   LACTATE 1.5 2.2      Latest Reference Range & Units 04/21/23 11:32   Procalcitonin <0.25 ng/mL 0.17    Labs    x WBC           Bands          CRP     Latest Reference Range & Units 04/21/23 11:32 04/22/23 03:49 04/23/23 06:42 04/24/23 05:29 04/27/23 05:07   WBC 3.90 - 12.70 K/uL 4.66 3.80 (L) 3.63 (L) 3.35 (L) 3.86 (L)      Latest Reference Range & Units 04/22/23 11:14   CRP 0.0 - 8.2 mg/L 111.6 (H)    Labs     Culture(s) Blood Culture, Routine No growth after 5 days.     Labs 4/21     AMS, Confusion, LOC, etc.      Organ Dysfunction/Failure     x Bacteremia or Sepsis / Septic Sepsis  This patient does have evidence of infective focus  My overall impression is sepsis.  Source: Skin and Soft Tissue (location RLE) H&P    x Known or Suspected Source of Infection documented  presumed cellulitis to right lower extremity  note 4/26     (Failed)  Outpatient Treatment     x Medication amoxicillin-clavulanate 875-125mg per tablet 1 tablet  Dose: 1 tablet  Freq: Every 12 hours Route: Oral  Indications of Use: lower respiratory infection  Start: 04/26/23 1115 End: 04/30/23 0859    piperacillin-tazobactam (ZOSYN) 4.5 g in dextrose 5 % in water (D5W) 5 % 100 mL IVPB (MB+)  Dose: 4.5 g  Freq: ED 1 Time Route: IV  Indications of Use: Skin & soft tissue  Start: 04/21/23 1500 End: 04/21/23 1630    piperacillin-tazobactam (ZOSYN) 4.5 g in dextrose 5 % in water (D5W) 5 % 100 mL IVPB (MB+)  Dose: 4.5 g  Freq: Every 8 hours (non-standard times) Route: IV  Indications of Use: Skin & soft tissue  Start: 04/21/23 2330 End: 04/24/23 1431    vancomycin - pharmacy to dose  Freq: pharmacy to manage frequency Route: IV  PRN Reason: Other  Start: 04/21/23 1600 End: 04/24/23 1431 MAR             MAR               MAR             MAR    x Treatment Wound care   PRN tylenol for pain or fever. Patient has multiple allergies  Diflucan and topical tx for yeast   Blood cx pending H&P    x Other Per ID 4/24 low concern for an active infection going on in the wound.  note 4/26         Provider, please specify diagnosis or diagnoses associated with above clinical findings.  [   ] Sepsis due to unknown organism   [ x  ] Sepsis Ruled Out   [   ] Other Infectious Disease (please specify): __________         Please document in your progress notes daily for the duration of treatment until resolved and include in your discharge summary.

## 2023-04-27 NOTE — PLAN OF CARE
Pt will dc to [facility]. Pt and pts daughter made aware of pts dc today. Pts daughter made aware of dc time. Jeri provided SW clearance for pt to dc today to [facility].     Report: [phone] (Call at 2pm)  Room: 310  Transportation: SW setup wheelchair transport for 2:30 pm. ETA pending.   Transport packet at nursing station  Facility: [facility]    SW will continue to follow pt throughout her transitions of care and assist with any dc needs.    Cleared from CM . Bedside Nurse and VN notified.      Future Appointments   Date Time Provider Department Center   5/4/2023  3:00 PM KIMBERLY Lopez [facility] AUDIO [facility]   6/15/2023 11:15 AM Lennie Louis DPM [facility] POD [facility] [place]   6/29/2023  8:30 AM Rachael Case MD MET IM [place]   7/12/2023 11:30 AM Brent Chapman Jr., MD [facility] CARDIO [place]        04/27/23 1219   Final Note   Assessment Type Final Discharge Note   Anticipated Discharge Disposition SNF   Hospital Resources/Appts/Education Provided Appointments scheduled by Navigator/Coordinator   Post-Acute Status   Discharge Delays None known at this time

## 2023-04-27 NOTE — PLAN OF CARE
Problem: Occupational Therapy  Goal: Occupational Therapy Goal  Description: Goals to be met by: 05/23/23     Patient will increase functional independence with ADLs by performing:    UE Dressing with Set-up Assistance.  GOAL MET 4/27/2023.  LE Dressing with Minimal Assistance.  Grooming while EOB with Modified [place].  Toileting from bedside commode with Supervision for hygiene and clothing management.   Toilet transfer to bedside commode with Supervision.  Upper extremity exercise program 3x10 reps per handout, with supervision.    Outcome: Ongoing, Progressing     Progressing towards goals as Pt. Performing UE dress with set-up while seated and handed pullover gown.   Requiring CGA for steadying/safety during functional transfers and short household level ambulation using RW due to impaired balance/stability.  Continued recommendation of post acute OT in SNF.  To benefit from continued acute care OT services to increase independence in self-care/functional transfers.  Continue POC.

## 2023-04-27 NOTE — PROGRESS NOTES
IP Liaison - Initial Visit Note    Patient: Jenniffer Jimenez  MRN:  [id number]  Date of Service:  4/27/2023  Completed by:  MICHAEL García    Reason for Visit   Patient presents with    IP Liaison Initial Visit       RSW met with patient at bedside in order to complete SDOH questionnaire and liaison assessment.  Pt has identified no immediate social barriers to care.  Pt expressed receiving support from pt daughter Afshan for transportation. Pt stated she was slightly stressed due to having to pay hospital bills/copays. RSW added [facility] information to pt AVS.     The following were addressed during this visit:  - Review SDOH Questions   - Complete patient assessment   - Complete initial visit with patient        Patient Summary     IP Liaison Patient Assessment    General  Level of Caregiver support: Member independent and does not need caregiver assistance  Have you had to make a decision between paying for any of the following in the last 2 months?: None  Transportation means: Family  Employment status: Retired and not working  Assessments  Was the PHQ Depression Screening completed this visit?: No  Was the YING-7 Screening completed this visit?: No       MICHAEL García

## 2023-04-27 NOTE — PT/OT/SLP PROGRESS
Occupational Therapy   Treatment    Name: Jenniffer Jimenez  MRN: [id number]  Admitting Diagnosis:  Acute on chronic diastolic heart failure       Recommendations:     Discharge Recommendations: nursing facility, skilled  Discharge Equipment Recommendations:   (TBD at next level of care)  Barriers to discharge:  Decreased caregiver support (current functional level)    Assessment:   Progressing towards goals as Pt. Performing UE dress with set-up while seated and handed pullover gown.   Requiring CGA for steadying/safety during functional transfers and short household level ambulation using RW due to impaired balance/stability.  Safety cues for safe RW positioning prior to ADL standing at sink and also cue needed for safe step direction bed>BSC.  Continued recommendation of post acute OT in SNF.  To benefit from continued acute care OT services to increase independence in self-care/functional transfers.  Continue POC.     Jenniffer Jimenez is a 89 y.o. female with a medical diagnosis of Acute on chronic diastolic heart failure.  She presents with below deficits decreasing independence in self-care/functional transfers. Performance deficits affecting function are weakness, impaired endurance, impaired self care skills, impaired functional mobility, gait instability, decreased lower extremity function, decreased upper extremity function, impaired balance, decreased safety awareness, impaired skin, impaired cardiopulmonary response to activity.     Rehab Prognosis:  Good; patient would benefit from acute skilled OT services to address these deficits and reach maximum level of function.       Plan:     Patient to be seen 5 x/week to address the above listed problems via self-care/home management, therapeutic activities, therapeutic exercises  Plan of Care Expires: 05/23/23  Plan of Care Reviewed with: patient    Subjective     Chief Complaint: No c/o pain.   Patient/Family Comments/goals: No goals stated at this time.    Pain/Comfort:  Pain Rating 1: 0/10  Pain Rating Post-Intervention 1: 0/10    Objective:     Communicated with: Viviana HODGES RN prior to session.  Patient found HOB elevated with chair check, telemetry, peripheral IV, oxygen upon OT entry to room.    General Precautions: Standard, fall ([place]; strict I&O's and 1500mL fluid restriction)    Orthopedic Precautions:N/A  Braces: N/A  Respiratory Status: Nasal cannula, flow 1 L/min     Occupational Performance:     Bed Mobility:    Supine>>sit SBA  Increased time   HOB  elevated    Functional Mobility/Transfers:  Step transfer bed>BSC CGA  Verbal cue for safe hand placement  Steadying assist during steps  Cue for step direction  Ambulating BSC>sink>bedside chair with RW CGA  Steadying assist needed  Cue for safe RW position at sink prior to ADL task with good follow through  Pt. Initially attempting to discard RW when near sink  Step transfer to BSC with CGA  Steadying assist needed for safety    Activities of Daily Living:  Toileting with MOD A   Voiding 300 CC with RN made aware  No need to manage clothing due to open back gown  Handed wipe and able to clean front nelida region in stance with CGA for steadying  Assist needed for back nelida hygiene in stance with BUE supported at RW  Hand hygiene seated at bedside chair with setup  Handed eye brow pencil and able to draw on B-eyebrows with RUE  UE dress with setup  Handed gown while seated  Assist for pulling gown over hips in stance with CGA       AMPAC 6 Click ADL: 15    Treatment & Education:  Supine>>sit SBA  Increased time   HOB  elevated  Step transfer bed>BSC CGA  Verbal cue for safe hand placement  Steadying assist during steps  Cue for step direction  Ambulating BSC>sink>bedside chair with RW CGA  Steadying assist needed  Cue for safe RW position at sink prior to ADL task with good follow through  Pt. Initially attempting to discard RW when near sink  Step transfer to BSC with CGA  Steadying assist needed for  safety  Toileting with MOD A   Voiding 300 CC with RN made aware  No need to manage clothing due to open back gown  Handed wipe and able to clean front nelida region in stance with CGA for steadying  Assist needed for back nelida hygiene in stance with BUE supported at RW  Hand hygiene seated at bedside chair with setup  Handed eye brow pencil and able to draw on B-eyebrows with RUE  UE dress with setup  Handed gown while seated  Assist for pulling gown over hips in stance with CGA     Patient left up in chair with all lines intact, call button in reach (educated on use and importance of calling for needs), and nursing notified    GOALS:   Multidisciplinary Problems       Occupational Therapy Goals          Problem: Occupational Therapy    Goal Priority Disciplines Outcome Interventions   Occupational Therapy Goal     OT, PT/OT Ongoing, Progressing    Description: Goals to be met by: 05/23/23     Patient will increase functional independence with ADLs by performing:    UE Dressing with Set-up Assistance.  GOAL MET 4/27/2023.  LE Dressing with Minimal Assistance.  Grooming while EOB with Modified [place].  Toileting from bedside commode with Supervision for hygiene and clothing management.   Toilet transfer to bedside commode with Supervision.  Upper extremity exercise program 3x10 reps per handout, with supervision.                         Time Tracking:     OT Date of Treatment: 04/27/23  OT Start Time: 1229  OT Stop Time: 1257  OT Total Time (min): 28 min    Billable Minutes:Self Care/Home Management 28    OT/ROMERO: OT     Number of ROMERO visits since last OT visit: 3    4/27/2023

## 2023-04-27 NOTE — PROGRESS NOTES
Reached out to dtr by phone to obtain IMM, no answer. No family at bedside when I called with [email or web address] will try to obtain later.

## 2023-04-27 NOTE — DISCHARGE SUMMARY
[facility] Medicine  Discharge Summary      Patient Name: Jenniffer Jimenez  MRN: [id number]  NANCY: [id number]  Patient Class: IP- Inpatient  Admission Date: 4/21/2023  Hospital Length of Stay: 3 days  Discharge Date and Time:  04/27/2023 11:17 AM  Attending Physician: Ari Medrano, *   Discharging Provider: Ari Medrano MD  Primary Care Provider: Rubi Young DO    Primary Care Team: Networked reference to record PCT     HPI:   89 yo female with a PMH of cataract, [place], CKD, DM II, diabetic polyneuropathy, HTN, PAF, TIA presents to the ED For evaluation of fever noted by home health nurse. RLE appears edematous, erythematous, weeping worse for unknown duration. She also has a rash to her perineal area and back. She notes pain to the RLE. Since in the ED she was noted to be in afib RVR, which is now rate controlled with a dose of IV cardizem. Patient is a poor historian, yelling, hx of psychiatric problem.     I tried to phone POA for further hx- no answer.     Current OP medications as reviewed per recent cardiology note:       acetaminophen (TYLENOL) 500 MG tablet, Take 1,000 mg by mouth 2 (two) times a day., Disp: , Rfl:     aspirin (ECOTRIN) 81 MG EC tablet, Take 1 tablet (81 mg total) by mouth once daily., Disp: 90 tablet, Rfl: 3    diclofenac sodium (VOLTAREN) 1 % Gel, Apply 2 g topically 4 (four) times daily. Use gloves to apply to right hip for 10 days, Disp: 100 g, Rfl: 1    miconazole NITRATE 2 % (MICOTIN) 2 % top powder, Apply topically 2 (two) times daily. To RLE., Disp: , Rfl: 0    nystatin (MYCOSTATIN) powder, Apply topically 2 (two) times daily., Disp: 30 g, Rfl: 1    pravastatin (PRAVACHOL) 40 MG tablet, Take 1 tablet (40 mg total) by mouth once daily., Disp: 90 tablet, Rfl: 3    silver sulfADIAZINE 1% (SILVADENE) 1 % cream, Apply to RLE skin breakdown twice daily, Disp: , Rfl:     triamcinolone acetonide 0.5% (KENALOG) 0.5 % Crea, Apply to legs with  "dressing changes., Disp: 454 g, Rfl: 0    vit C/E/Zn/coppr/lutein/zeaxan (OCUVITE LUTEIN AND ZEAXANTHIN ORAL), Take 1 capsule by mouth once daily. Lutien 25 mg, Zeaxanthin 5 mg, Disp: , Rfl:     vitamin E 400 UNIT capsule, Take 400 Units by mouth once daily., Disp: , Rfl:     furosemide (LASIX) 20 MG tablet, Take 1 tablet (20 mg total) by mouth once daily. Hold for weights 150 lb or less, Disp: 30 tablet, Rfl: 11    QUEtiapine (SEROQUEL) 25 MG Tab, Take 1 tablet (25 mg total) by mouth every evening. (Patient not taking: Reported on 4/12/2023), Disp: 30 tablet, Rfl: 11    sodium zirconium cyclosilicate (LOKELMA) 5 gram packet, Take 1 packet (5 g total) by mouth 2 (two) times a day. Mix entire contents of packet(s) into drinking glass containing 3 tablespoons of water; stir well and drink immediately. Add water and repeat until no powder remains to receive entire dose., Disp: 60 packet, Rfl: 3       * No surgery found *      Hospital Course:   Ms. Jimenez was admitted for AFib RVR and fever, presumed cellulitis to right lower extremity.  She was treated with IV Cardizem for RVR.  Beta-blockers are listed as allergies on her allergy profile.  She was treated with IV vancomycin and Zosyn.    She was also treated with IV Lasix for acute exacerbation of chronic diastolic heart failure. Now appears euvolemic. Have increased home lasix dose from 20mg->40mg. Did repeat CXR which is concerning for development of LLL opacification, c/f CAP. On oral antibiotics. Now weaned off supplemental O2. Per ID 4/24 low concern for an active infection going on in the wound. Will continue aggressive wound care. Arrange for follow up with [facility], Cardiology, and Wound Care clinic. Discharge to SNF.     BP (!) 124/58   Pulse 78   Temp 98.3 °F (36.8 °C)   Resp 17   Ht 5' 5" (1.651 m)   Wt 70.8 kg (156 lb)   LMP  (LMP Unknown)   SpO2 97%   BMI 25.96 kg/m²   Physical Exam  Constitutional:       Appearance: She is " ill-appearing.   HENT:      Head: Normocephalic and atraumatic.      Mouth/Throat:      Pharynx: Oropharynx is clear.   Eyes:      Conjunctiva/sclera: Conjunctivae normal.   Cardiovascular:      Rate and Rhythm: Normal rate. Rhythm irregular.      Pulses: Normal pulses.      Heart sounds: Normal heart sounds.   Pulmonary:      Effort: Pulmonary effort is normal.      Comments: Coarse, but improving   Abdominal:      General: Abdomen is flat. Bowel sounds are normal.      Palpations: Abdomen is soft.   Musculoskeletal:         General: Swelling and tenderness present.      Cervical back: Normal range of motion and neck supple.   Skin:     General: Skin is warm and dry.      Findings: Rash present.      Comments: RLE edematous, erythematous, weeping, tender, warm  Palpable pulse      LLE with skin tear and rash, does not appear infected      Noted yeast type rash to perineal area     Erythematous rash to back    Neurological:      General: No focal deficit present.      Mental Status: She is alert.   Psychiatric:         Mood and Affect: Mood is not anxious.         Behavior: Behavior is not agitated.        Goals of Care Treatment Preferences:  Code Status: Full Code      Consults:   Consults (From admission, onward)        Status Ordering Provider     Inpatient consult to Infectious Diseases  Once        Provider:  (Not yet assigned)    Completed MYKE JONES     IP consult to case management  Once        Provider:  (Not yet assigned)    Completed HAWA STAPLETON     Inpatient consult to Cardiology-[facility]  Once        Provider:  (Not yet assigned)    Completed MYKE JONES          No new Assessment & Plan notes have been filed under this hospital service since the last note was generated.  Service: Hospital Medicine    Final Active Diagnoses:    Diagnosis Date Noted POA    PRINCIPAL PROBLEM:  Acute on chronic diastolic heart failure [I50.33] 05/04/2016 Yes    Community acquired pneumonia of left  lower lobe of lung [J18.9] 04/26/2023 Yes    Heart failure with preserved ejection fraction [I50.30] 04/24/2023 Yes    Microcytic anemia [D50.9] 04/22/2023 Yes    Presence of Watchman left atrial appendage closure device [Z95.818] 12/15/2020 Yes     Chronic    Venous insufficiency of both lower extremities [I87.2] 11/22/2019 Yes    Prediabetes [R73.03] 03/31/2017 Yes    Stage 3b chronic kidney disease [N18.32] 05/04/2016 Yes    History of TIA (transient ischemic attack) [Z86.73] 05/04/2016 Not Applicable    Chronic a-fib [I48.20] 01/29/2016 Yes    Debility [R53.81] 04/25/2013 Yes    Primary hypertension [I10]  Yes     Chronic    Pure hypercholesterolemia [E78.00] 07/02/2012 Yes     Chronic      Problems Resolved During this Admission:    Diagnosis Date Noted Date Resolved POA    Constipation [K59.00] 04/23/2023 04/26/2023 Yes    Sepsis [A41.9] 04/21/2023 04/25/2023 Yes       Discharged Condition: fair    Disposition: Skilled Nursing Facility    Follow Up:   Follow-up Information     Srinivas Deshpande MD Follow up in 1 week(s).    Specialty: Infectious Diseases  Contact information:  [address]  [address] [place], [place] [address] [id number] [phone]                       Patient Instructions:      Ambulatory referral/consult to Cardiology   Standing Status: Future   Referral Priority: Routine Referral Type: Consultation   Referral Reason: Specialty Services Required   Requested Specialty: Cardiology   Number of Visits Requested: 1     Ambulatory referral/consult to Wound Clinic   Standing Status: Future   Referral Priority: Routine Referral Type: Consultation   Referral Reason: Specialty Services Required   Requested Specialty: Wound Care   Number of Visits Requested: 1     Diet Adult Regular     Notify your health care provider if you experience any of the following:  temperature >100.4     Notify your health care provider if you experience any of the following:  persistent nausea and vomiting or  diarrhea     Notify your health care provider if you experience any of the following:  severe uncontrolled pain     Notify your health care provider if you experience any of the following:  difficulty breathing or increased cough     Notify your health care provider if you experience any of the following:  severe persistent headache     Notify your health care provider if you experience any of the following:  persistent dizziness, light-headedness, or visual disturbances     Notify your health care provider if you experience any of the following:  increased confusion or weakness     Activity as tolerated       Significant Diagnostic Studies: N/A    Pending Diagnostic Studies:     None         Medications:  Reconciled Home Medications:      Medication List      START taking these medications    albuterol-ipratropium 2.5 mg-0.5 mg/3 mL nebulizer solution  Commonly known as: DUO-NEB  Take 3 mLs by nebulization every 6 (six) hours as needed for Wheezing or Shortness of Breath. Rescue     amoxicillin-clavulanate 875-125mg 875-125 mg per tablet  Commonly known as: AUGMENTIN  Take 1 tablet by mouth every 12 (twelve) hours. End 4/30/23     dextromethorphan-guaiFENesin  mg/5 ml  mg/5 mL liquid  Commonly known as: ROBITUSSIN-DM  Take 5 mLs by mouth every 4 (four) hours as needed (cough).     doxycycline 100 MG tablet  Commonly known as: VIBRA-TABS  Take 1 tablet (100 mg total) by mouth every 12 (twelve) hours. End 4/30/23     ferrous sulfate 325 (65 FE) MG EC tablet  Take 1 tablet (325 mg total) by mouth once daily.     fluconazole 200 MG Tab  Commonly known as: DIFLUCAN  Take 1 tablet (200 mg total) by mouth once daily. End 5/5/23     fluticasone propionate 50 mcg/actuation nasal spray  Commonly known as: FLONASE  2 sprays (100 mcg total) by Each Nostril route once daily.     miconazole nitrate 2% 2 % Oint  Commonly known as: MICOTIN  Apply topically 2 (two) times daily. Coloplast Clear Antifungal ointment-  (green top)- nursing to apply to perineum, inner thighs, pannus, and buttocks BID        CHANGE how you take these medications    furosemide 40 MG tablet  Commonly known as: LASIX  Take 1 tablet (40 mg total) by mouth once daily.  What changed:   · medication strength  · how much to take  · additional instructions     silver sulfADIAZINE 1% 1 % cream  Commonly known as: SILVADENE  Apply to RLE skin breakdown twice daily  What changed:   · how to take this  · when to take this        CONTINUE taking these medications    acetaminophen 500 MG tablet  Commonly known as: TYLENOL  Take 1,000 mg by mouth 2 (two) times a day.     aspirin 81 MG EC tablet  Commonly known as: ECOTRIN  Take 1 tablet (81 mg total) by mouth once daily.     diclofenac sodium 1 % Gel  Commonly known as: VOLTAREN  Apply 2 g topically 4 (four) times daily. Apply to right hip     pravastatin 40 MG tablet  Commonly known as: PRAVACHOL  Take 1 tablet (40 mg total) by mouth once daily.     SYSTANE COMPLETE OPHT  Apply to eye.     vitamin E 400 UNIT capsule  Take 400 Units by mouth once daily.        STOP taking these medications    NYSTOP powder  Generic drug: nystatin     QUEtiapine 25 MG Tab  Commonly known as: SEROQUEL     sodium zirconium cyclosilicate 5 gram packet  Commonly known as: LOKELMA            Indwelling Lines/Drains at time of discharge:   Lines/Drains/Airways     None                 Time spent on the discharge of patient: 33 minutes         Ari Medrano MD  Department of Hospital Medicine  [facility]

## 2023-04-27 NOTE — NURSING
Assumed care of patient from EDITH Mora, patient is AAOX4, room air, vitals WNL, daughter visiting at bedside, no c/o pain or discomfort, applied anti-fungal on lower body area and skin folds, patient takes meds whole, SBAX1 to BSC and chair if needed, b/l leg dressing change completed during dayshift, all safety precautions are in place, call bell and tray table are within reach of the patient and will continue to monitor.

## 2023-04-27 NOTE — PLAN OF CARE
Problem: Adult Inpatient Plan of Care  Goal: Plan of Care Review  Outcome: Ongoing, Progressing  Goal: Patient-Specific Goal (Individualized)  Outcome: Ongoing, Progressing  Goal: Absence of Hospital-Acquired Illness or Injury  Outcome: Ongoing, Progressing  Goal: Optimal Comfort and Wellbeing  Outcome: Ongoing, Progressing  Goal: Readiness for Transition of Care  Outcome: Ongoing, Progressing     Problem: Diabetes Comorbidity  Goal: Blood Glucose Level Within Targeted Range  Outcome: Ongoing, Progressing     Problem: Adjustment to Illness (Sepsis/Septic Shock)  Goal: Optimal Coping  Outcome: Ongoing, Progressing     Problem: Bleeding (Sepsis/Septic Shock)  Goal: Absence of Bleeding  Outcome: Ongoing, Progressing     Problem: Glycemic Control Impaired (Sepsis/Septic Shock)  Goal: Blood Glucose Level Within Desired Range  Outcome: Ongoing, Progressing     Problem: Infection Progression (Sepsis/Septic Shock)  Goal: Absence of Infection Signs and Symptoms  Outcome: Ongoing, Progressing     Problem: Nutrition Impaired (Sepsis/Septic Shock)  Goal: Optimal Nutrition Intake  Outcome: Ongoing, Progressing     Problem: Impaired Wound Healing  Goal: Optimal Wound Healing  Outcome: Ongoing, Progressing     Problem: Fall Injury Risk  Goal: Absence of Fall and Fall-Related Injury  Outcome: Ongoing, Progressing     Problem: Skin Injury Risk Increased  Goal: Skin Health and Integrity  Outcome: Ongoing, Progressing     Problem: Adjustment to Illness (Chronic Kidney Disease)  Goal: Optimal Coping with Chronic Illness  Outcome: Ongoing, Progressing     Problem: Electrolyte Imbalance (Chronic Kidney Disease)  Goal: Electrolyte Balance  Outcome: Ongoing, Progressing     Problem: Fluid Volume Excess (Chronic Kidney Disease)  Goal: Fluid Balance  Outcome: Ongoing, Progressing     Problem: Functional Decline (Chronic Kidney Disease)  Goal: Optimal Functional Ability  Outcome: Ongoing, Progressing     Problem: Hematologic Alteration  (Chronic Kidney Disease)  Goal: Absence of Anemia Signs and Symptoms  Outcome: Ongoing, Progressing     Problem: Oral Intake Inadequate (Chronic Kidney Disease)  Goal: Optimal Oral Intake  Outcome: Ongoing, Progressing     Problem: Pain (Chronic Kidney Disease)  Goal: Acceptable Pain Control  Outcome: Ongoing, Progressing     Problem: Renal Function Impairment (Chronic Kidney Disease)  Goal: Minimize Renal Failure Effects  Outcome: Ongoing, Progressing     Problem: Gas Exchange Impaired  Goal: Optimal Gas Exchange  Outcome: Ongoing, Progressing     Problem: Fluid Imbalance (Pneumonia)  Goal: Fluid Balance  Outcome: Ongoing, Progressing     Problem: Infection (Pneumonia)  Goal: Resolution of Infection Signs and Symptoms  Outcome: Ongoing, Progressing     Problem: Respiratory Compromise (Pneumonia)  Goal: Effective Oxygenation and Ventilation  Outcome: Ongoing, Progressing

## 2023-04-27 NOTE — PLAN OF CARE
"RN Case  Order Review      Date:  04/27/2023  Discharging Facility noted:   [facility]  Isolation needed:    ESBL, MDRO  Med List Reconciled?:   Reviewed. Facility is aware of orders and reviewing. PENDING ORDER ACCEPTANCE AND REPORT INFORMATION.  Oxygen:  RA    Lines noted: PIV SL'd to be DC'd Prior to DC.      LBM:    04/26/2023  Woundcare/Wound vac:  noted on orders    ABX with end date: NA  Discharging with Line:  NA    Ins auth:   Auth received by facility    Intake forms completed:   NA    Future Appointments   Date Time Provider Department Center   5/4/2023  3:00 PM KIMBERLY Lopez [facility]   6/15/2023 11:15 AM Lennie Louis DPM [facility] POD [facility] [place]   6/29/2023  8:30 AM Rachael Case MD [facility] IM [place]   7/12/2023 11:30 AM Brent Chapman Jr., MD [facility] CARDIO [place]        Follow-up Information       Srinivas Deshpande MD Follow up in 1 week(s).    Specialty: Infectious Diseases  Contact information:  [address]  [address]  [place] [address]  [phone]                             BP (!) 124/58   Pulse 78   Temp 98.3 °F (36.8 °C)   Resp 17   Ht 5' 5" (1.651 m)   Wt 70.8 kg (156 lb)   LMP  (LMP Unknown)   SpO2 97%   BMI 25.96 kg/m²        Medication List        START taking these medications      albuterol-ipratropium 2.5 mg-0.5 mg/3 mL nebulizer solution  Commonly known as: DUO-NEB  Take 3 mLs by nebulization every 6 (six) hours as needed for Wheezing or Shortness of Breath. Rescue     amoxicillin-clavulanate 875-125mg 875-125 mg per tablet  Commonly known as: AUGMENTIN  Take 1 tablet by mouth every 12 (twelve) hours. End 4/30/23     dextromethorphan-guaiFENesin  mg/5 ml  mg/5 mL liquid  Commonly known as: ROBITUSSIN-DM  Take 5 mLs by mouth every 4 (four) hours as needed (cough).     doxycycline 100 MG tablet  Commonly known as: VIBRA-TABS  Take 1 tablet (100 mg total) by mouth every 12 (twelve) hours. End 4/30/23     ferrous " sulfate 325 (65 FE) MG EC tablet  Take 1 tablet (325 mg total) by mouth once daily.     fluconazole 200 MG Tab  Commonly known as: DIFLUCAN  Take 1 tablet (200 mg total) by mouth once daily. End 5/5/23     fluticasone propionate 50 mcg/actuation nasal spray  Commonly known as: FLONASE  2 sprays (100 mcg total) by Each Nostril route once daily.     miconazole nitrate 2% 2 % Oint  Commonly known as: MICOTIN  Apply topically 2 (two) times daily. Coloplast Clear Antifungal ointment- (green top)- nursing to apply to perineum, inner thighs, pannus, and buttocks BID            CHANGE how you take these medications      furosemide 40 MG tablet  Commonly known as: LASIX  Take 1 tablet (40 mg total) by mouth once daily.  What changed:   medication strength  how much to take  additional instructions     silver sulfADIAZINE 1% 1 % cream  Commonly known as: SILVADENE  Apply to RLE skin breakdown twice daily  What changed:   how to take this  when to take this            CONTINUE taking these medications      acetaminophen 500 MG tablet  Commonly known as: TYLENOL     aspirin 81 MG EC tablet  Commonly known as: ECOTRIN  Take 1 tablet (81 mg total) by mouth once daily.     diclofenac sodium 1 % Gel  Commonly known as: VOLTAREN     pravastatin 40 MG tablet  Commonly known as: PRAVACHOL  Take 1 tablet (40 mg total) by mouth once daily.     SYSTANE COMPLETE OPHT     vitamin E 400 UNIT capsule            STOP taking these medications      NYSTOP powder  Generic drug: nystatin     QUEtiapine 25 MG Tab  Commonly known as: SEROQUEL     sodium zirconium cyclosilicate 5 gram packet  Commonly known as: LOKELMA               Where to Get Your Medications        Information about where to get these medications is not yet available    Ask your nurse or doctor about these medications  albuterol-ipratropium 2.5 mg-0.5 mg/3 mL nebulizer solution  amoxicillin-clavulanate 875-125mg 875-125 mg per tablet  dextromethorphan-guaiFENesin  mg/5 ml   mg/5 mL liquid  doxycycline 100 MG tablet  ferrous sulfate 325 (65 FE) MG EC tablet  fluconazole 200 MG Tab  fluticasone propionate 50 mcg/actuation nasal spray  furosemide 40 MG tablet  miconazole nitrate 2% 2 % Oint

## 2023-04-27 NOTE — NURSING
RECEIVED ON FLOOR FROM [facility] BY [facility] WHEELCHAIR TRANSPORT SERVICE.ACCOMPANIED BY TRANSPORTER.AWAKE AND ALERT ORIENTED X4.TRANSPORTED TO BED WITH ASSISTANCE.NO COMPLAINT OF DISCOMFORT VERBALIZED..

## 2023-04-27 NOTE — PLAN OF CARE
Problem: Adult Inpatient Plan of Care  Goal: Plan of Care Review  Outcome: Ongoing, Progressing   Chart reviewed per VN.

## 2023-04-28 ENCOUNTER — PATIENT OUTREACH (OUTPATIENT)
Dept: ADMINISTRATIVE | Facility: OTHER | Age: 88
End: 2023-04-28
Payer: MEDICARE

## 2023-04-28 LAB
ESTIMATED AVG GLUCOSE: 123 MG/DL (ref 68–131)
HBA1C MFR BLD: 5.9 % (ref 4–5.6)
POCT GLUCOSE: 111 MG/DL (ref 70–110)
POCT GLUCOSE: 140 MG/DL (ref 70–110)
POCT GLUCOSE: 151 MG/DL (ref 70–110)
POCT GLUCOSE: 162 MG/DL (ref 70–110)

## 2023-04-28 PROCEDURE — 97162 PT EVAL MOD COMPLEX 30 MIN: CPT | Mod: HCNC

## 2023-04-28 PROCEDURE — 11000004 HC SNF PRIVATE: Mod: HCNC

## 2023-04-28 PROCEDURE — 99900035 HC TECH TIME PER 15 MIN (STAT): Mod: HCNC

## 2023-04-28 PROCEDURE — 36415 COLL VENOUS BLD VENIPUNCTURE: CPT | Mod: HCNC | Performed by: NURSE PRACTITIONER

## 2023-04-28 PROCEDURE — 25000242 PHARM REV CODE 250 ALT 637 W/ HCPCS: Mod: HCNC | Performed by: HOSPITALIST

## 2023-04-28 PROCEDURE — 97165 OT EVAL LOW COMPLEX 30 MIN: CPT | Mod: HCNC

## 2023-04-28 PROCEDURE — 97110 THERAPEUTIC EXERCISES: CPT | Mod: HCNC

## 2023-04-28 PROCEDURE — 83036 HEMOGLOBIN GLYCOSYLATED A1C: CPT | Mod: HCNC | Performed by: NURSE PRACTITIONER

## 2023-04-28 PROCEDURE — 97535 SELF CARE MNGMENT TRAINING: CPT | Mod: HCNC

## 2023-04-28 PROCEDURE — 97530 THERAPEUTIC ACTIVITIES: CPT | Mod: HCNC

## 2023-04-28 PROCEDURE — 94640 AIRWAY INHALATION TREATMENT: CPT | Mod: HCNC

## 2023-04-28 PROCEDURE — 25000003 PHARM REV CODE 250: Mod: HCNC | Performed by: NURSE PRACTITIONER

## 2023-04-28 PROCEDURE — 25000003 PHARM REV CODE 250: Mod: HCNC | Performed by: HOSPITALIST

## 2023-04-28 PROCEDURE — 94761 N-INVAS EAR/PLS OXIMETRY MLT: CPT | Mod: HCNC

## 2023-04-28 RX ORDER — GUAIFENESIN 600 MG/1
600 TABLET, EXTENDED RELEASE ORAL 2 TIMES DAILY
Status: DISCONTINUED | OUTPATIENT
Start: 2023-04-28 | End: 2023-05-01

## 2023-04-28 RX ORDER — FUROSEMIDE 20 MG/1
20 TABLET ORAL ONCE
Status: COMPLETED | OUTPATIENT
Start: 2023-04-28 | End: 2023-04-28

## 2023-04-28 RX ORDER — GLUCAGON 1 MG
1 KIT INJECTION
Status: DISCONTINUED | OUTPATIENT
Start: 2023-04-28 | End: 2023-05-12 | Stop reason: HOSPADM

## 2023-04-28 RX ORDER — DEXTROSE 40 %
30 GEL (GRAM) ORAL
Status: DISCONTINUED | OUTPATIENT
Start: 2023-04-28 | End: 2023-05-12 | Stop reason: HOSPADM

## 2023-04-28 RX ORDER — DEXTROSE 40 %
15 GEL (GRAM) ORAL
Status: DISCONTINUED | OUTPATIENT
Start: 2023-04-28 | End: 2023-05-12 | Stop reason: HOSPADM

## 2023-04-28 RX ADMIN — DICLOFENAC SODIUM 2 G: 10 GEL TOPICAL at 09:04

## 2023-04-28 RX ADMIN — SENNOSIDES AND DOCUSATE SODIUM 1 TABLET: 50; 8.6 TABLET ORAL at 09:04

## 2023-04-28 RX ADMIN — DOXYCYCLINE HYCLATE 100 MG: 100 TABLET, COATED ORAL at 08:04

## 2023-04-28 RX ADMIN — FUROSEMIDE 40 MG: 40 TABLET ORAL at 09:04

## 2023-04-28 RX ADMIN — GUAIFENESIN 600 MG: 600 TABLET, EXTENDED RELEASE ORAL at 08:04

## 2023-04-28 RX ADMIN — ACETAMINOPHEN 1000 MG: 500 TABLET ORAL at 08:04

## 2023-04-28 RX ADMIN — SENNOSIDES AND DOCUSATE SODIUM 1 TABLET: 50; 8.6 TABLET ORAL at 08:04

## 2023-04-28 RX ADMIN — PRAVASTATIN SODIUM 40 MG: 20 TABLET ORAL at 09:04

## 2023-04-28 RX ADMIN — AMOXICILLIN AND CLAVULANATE POTASSIUM 1 TABLET: 875; 125 TABLET, FILM COATED ORAL at 08:04

## 2023-04-28 RX ADMIN — FUROSEMIDE 20 MG: 20 TABLET ORAL at 02:04

## 2023-04-28 RX ADMIN — MICONAZOLE NITRATE: 20 OINTMENT TOPICAL at 09:04

## 2023-04-28 RX ADMIN — AMOXICILLIN AND CLAVULANATE POTASSIUM 1 TABLET: 875; 125 TABLET, FILM COATED ORAL at 09:04

## 2023-04-28 RX ADMIN — FERROUS SULFATE TAB 325 MG (65 MG ELEMENTAL FE) 1 EACH: 325 (65 FE) TAB at 09:04

## 2023-04-28 RX ADMIN — DOXYCYCLINE HYCLATE 100 MG: 100 TABLET, COATED ORAL at 09:04

## 2023-04-28 RX ADMIN — GUAIFENESIN AND DEXTROMETHORPHAN 5 ML: 100; 10 SYRUP ORAL at 12:04

## 2023-04-28 RX ADMIN — FLUCONAZOLE 200 MG: 200 TABLET ORAL at 09:04

## 2023-04-28 RX ADMIN — GUAIFENESIN 600 MG: 600 TABLET, EXTENDED RELEASE ORAL at 02:04

## 2023-04-28 RX ADMIN — Medication 400 UNITS: at 09:04

## 2023-04-28 RX ADMIN — IPRATROPIUM BROMIDE AND ALBUTEROL SULFATE 3 ML: 2.5; .5 SOLUTION RESPIRATORY (INHALATION) at 08:04

## 2023-04-28 RX ADMIN — ASPIRIN 81 MG: 81 TABLET, COATED ORAL at 09:04

## 2023-04-28 RX ADMIN — ACETAMINOPHEN 1000 MG: 500 TABLET ORAL at 09:04

## 2023-04-28 RX ADMIN — DICLOFENAC SODIUM 2 G: 10 GEL TOPICAL at 12:04

## 2023-04-28 RX ADMIN — FLUTICASONE PROPIONATE 100 MCG: 50 SPRAY, METERED NASAL at 09:04

## 2023-04-28 NOTE — PT/OT/SLP EVAL
Physical Therapy Evaluation    Patient Name:  Jenniffer Jimenez   MRN:  [id number]  Admit Date: 4/27/2023  Recent Surgeries: N/A    General Precautions: Standard, fall, hearing impaired   Orthopedic Precautions: N/A   Braces: N/A    Recommendations:     Discharge Recommendations: assisted living facility   Level of Assistance Recommended: 24 hours light assistance  Discharge Equipment Recommendations: none   Barriers to discharge: None    Assessment:     Jenniffer Jimenez is a 89 y.o. female admitted with a medical diagnosis of  Sepsis.  Performance deficits affecting function  weakness, impaired endurance, impaired self care skills, impaired functional mobility, gait instability, impaired balance, decreased upper extremity function, decreased lower extremity function, decreased ROM, impaired cardiopulmonary response to activity. Pt was Mod I using a rollator PTA and now requires Deirdre for most functional mobility.pt is very motivated to return to her prior level of function and will therefore benefit from continued SNF rehab.    Rehab Potential is good     Activity Tolerance: Good    Plan:     Patient to be seen 5 x/week to address the above listed problems via gait training, therapeutic activities, therapeutic exercises, neuromuscular re-education, wheelchair management/training     Plan of Care Expires: 05/28/23  Plan of Care Reviewed with: patient    Subjective     Chief Complaint: fear of falling  Patient/Family Comments/goals: gain (I)  Pain/Comfort:  Pain Rating 1: 0/10  Pain Rating Post-Intervention 1: 0/10    Living Environment:     Pt lives alone in 5th floor [facility] senior living-with elevator.  Handicap toilet, WIS with built in bench and GB   Prior to admission, patients level of function was mod I for ADl and amb with rollator.  Pt states she rode stationary bike for 1 hr 5 x week.  .  Equipment used at home: bedside commode, walker, rolling, rollator, cane, straight.  DME owned (not  currently used): none.  Upon discharge, patient will have limited assistance from daughter.       Patients cultural, spiritual, Quaker conflicts given the current situation: yes    Objective:     Communicated with pt's nurse prior to session.  Patient found HOB elevated with oxygen (uses PRN)  upon PT entry to room.    Exams:  Cognitive Exam:  Patient is oriented to Person, Place, Time, and Situation  Gross Motor Coordination:  WFL  Sensation:    -       Intact  Skin Integrity/Edema:      -       Dressings on B l/Es intact  LUE Strength: decreased as pt having difficulty lifting RW and pushing off from w/c armrests.  RLE ROM: WFL  RLE Strength: grossly 4/5  LLE ROM: WFL  LLE Strength: grossly 4/5    Functional Mobility:     04/28/23 1423   Prior Functioning: Everyday Activities   Self Care Independent   Indoor Mobility (Ambulation) Independent   Stairs Unknown   Functional Cognition Independent   Prior Device Use Walker   Roll Left and Right   Assistance Needed Incidental touching   CARE Score - Roll Left and Right 4   Sit to Lying   Assistance Needed Incidental touching   CARE Score - Sit to Lying 4   Lying to Sitting on Side of Bed   Assistance Needed Incidental touching   CARE Score - Lying to Sitting on Side of Bed 4   Sit to Stand   Assistance Needed Incidental touching   Comment with RW   CARE Score - Sit to Stand 4   Chair/Bed-to-Chair Transfer   Assistance Needed Incidental touching   Comment SPT with no AD   CARE Score - Chair/Bed-to-Chair Transfer 4   Chair/Bed-to-Chair Transfer Discharge Goal   Discharge Goal 5   Car Transfer   Reason if not Attempted Environmental limitations   CARE Score - Car Transfer 10   Walk 10 Feet   Physical Assistance Level 25% or less   Comment with RW, Deirdre, 2* to pt with FFT, decrease marco and fear of falling. pt ambulated 80ft.   CARE Score - Walk 10 Feet 3   Walk 50 Feet with Two Turns   Physical Assistance Level 25% or less   Comment with RW, Deirdre, 2* to pt with FFT,  decrease marco and fear of falling. pt ambulated 80ft.   CARE Score - Walk 50 Feet with Two Turns 3   Walk 150 Feet   Reason if not Attempted Safety concerns   CARE Score - Walk 150 Feet 88   Walking 10 Feet on Uneven Surfaces   Physical Assistance Level 25% or less   Comment with RW   CARE Score - Walking 10 Feet on Uneven Surfaces 3   1 Step (Curb)   Physical Assistance Level 25% or less   Comment Deirdre mainly to elevate RW onto curb d.t pt with decrease MMT LUE   CARE Score - 1 Step (Curb) 3   4 Steps   Reason if not Attempted Safety concerns   CARE Score - 4 Steps 88   12 Steps   Reason if not Attempted Safety concerns   CARE Score - 12 Steps 88   Picking Up Object   Reason if not Attempted Safety concerns   CARE Score - Picking Up Object 88   Uses a Wheelchair/Scooter?   Uses a Wheelchair/Scooter? Yes   Wheel 50 Feet with Two Turns   Physical Assistance Level 26%-50%   CARE Score - Wheel 50 Feet with Two Turns 3   Type of Wheelchair/Scooter Manual   Wheel 150 Feet   Physical Assistance Level 26%-50%   CARE Score - Wheel 150 Feet 3   Type of Wheelchair/Scooter Manual       Therapeutic Activities and Exercises: Additional time spent on education and therex.  Bed>BSC transfer with no Ad and CGA.  Mini-eliptical with resistance set at medium for 12mins to help improve B L/E MMT and endurance.    Education:  Patient provided with daily orientation and goals of this PT session.  Patient educated to transfer to bedside chair/bedside commode/bathroom with RN/PCT present.   Patient educated on Fall risk and plan of care by explanation.   Patient Verbalized Understanding.  Time provided for therapeutic counseling and discussion of current health disposition. All questions answered to satisfaction, within scope of PT practice; voiced no other concerns. White board updated in patient's room, RN notified of session.     AM-PAC 6 CLICK MOBILITY  Total Score:16     Patient left up in chair with all lines intact and call  button in reach.    GOALS:   Multidisciplinary Problems       Physical Therapy Goals          Problem: Physical Therapy    Goal Priority Disciplines Outcome Goal Variances Interventions   Physical Therapy Goal     PT, PT/OT Ongoing, Progressing     Description: Goals to be met by: 23     Patient will increase functional independence with mobility by performing:    . Supine to sit with Set-up [place]  . Sit to supine with Set-up [place]  . Rolling to Left and Right with Modified [place].  . Sit to stand transfer with Supervision  . Bed to chair transfer with Supervision using Rolling Walker  . Gait  x 150 feet with Stand-by Assistance using Rolling Walker.   . Wheelchair propulsion x150 feet with Stand-by Assistance using bilateral uppper extremities  . Ascend/Descend 4 inch curb step with Contact Guard Assistance using Rolling Walker.                         History:     Past Medical History:   Diagnosis Date    Amblyopia of left eye 04/10/2013    Arthritis     Facet arthropathy, Lumbosacral    Atherosclerosis of aorta     Cataract     Central retinal vein occlusion of left eye     CKD (chronic kidney disease) stage 3, GFR 30-59 ml/min     Diabetes mellitus, type 2     Diabetic polyneuropathy 2022    Essential (primary) hypertension     Exotropia of both eyes 2013    recession RSR 5.0mm w/ adj; recession LR os 5.0 w/ adj; resect MR os  4.0mm    Hearing loss     History of resection of small bowel     Hypertensive retinopathy of both eyes     Hypoglycemia     Macular degeneration     OA (osteoarthritis) of shoulder     Right    Osteoporosis     Paroxysmal atrial fibrillation     Posterior vitreous detachment of both eyes     Psychiatric problem     Rhinitis     TIA (transient ischemic attack)        Past Surgical History:   Procedure Laterality Date    APPENDECTOMY      CARDIAC CATHETERIZATION      CATARACT EXTRACTION W/  INTRAOCULAR LENS IMPLANT Bilateral      SECTION, CLASSIC       CLOSURE OF LEFT ATRIAL APPENDAGE USING DEVICE N/A 11/4/2020    Procedure: Left atrial appendage closure device;  Surgeon: Abundio Curtis MD;  Location: [facility] C[facility] LAB;  Service: Cardiology;  Laterality: N/A;    HYSTERECTOMY      INNER EAR SURGERY      JOINT REPLACEMENT      LEFT KNEE REPLACEMENT IN 2013 -    OOPHORECTOMY      SINUS SURGERY      STRABISMUS SURGERY  2/6/13    RSR recession 5 mm, LLR recession 5 mm and LMR resection 4mm    STRABISMUS SURGERY  03/19/2014    recess LR OD 6mm    TONSILLECTOMY      watchman surgery N/A 11/2020       Time Tracking:     PT Received On: 04/28/23  PT Start Time: 1015     PT Stop Time: 1100  PT Total Time (min): 45 min     Billable Minutes: Evaluation 20, Therapeutic Activity 10, and Therapeutic Exercise 15      04/28/2023

## 2023-04-28 NOTE — PLAN OF CARE
Problem: Occupational Therapy  Goal: Occupational Therapy Goal  Description: Goals to be met by: 5/19/23     Patient will increase functional independence with ADLs by performing:    UE Dressing with Stand-by Assistance.  LE Dressing with Stand-by Assistance and Assistive Devices as needed.  Grooming while standing at sink with Supervision.  Toileting from toilet with Supervision for hygiene and clothing management.   Bathing from  standing at sink with Stand-by Assistance.  Supine to sit with Supervision.  Step transfer with Supervision  Upper extremity exercise program with assistance as needed.    Outcome: Ongoing, Progressing     OT eval completed. The above goals are established to improve functional (I) and mobility.

## 2023-04-28 NOTE — PT/OT/SLP EVAL
Occupational Therapy   Evaluation    Name: Jenniffer Jimenez  MRN: [id number]  Admit Date: 4/27/2023  Recent Surgeries: none     General Precautions: Standard, hearing impaired, fall  Orthopedic Precautions:N/A   Braces: N/A    Recommendations:     Discharge Recommendations: assisted living facility  Level of Assistance Recommended: Intermittent assistance   Discharge Equipment Recommendations:  none  Barriers to discharge:  None    Assessment:     Jenniffer Jimenez is a 89 y.o. female with a medical diagnosis of <principal problem not specified>.  She presents with performance deficits affecting function: weakness, impaired endurance, impaired self care skills, impaired functional mobility, gait instability, impaired balance, decreased upper extremity function, decreased lower extremity function, decreased ROM, impaired cardiopulmonary response to activity.  Pt is limited by LE wounds and bandages, as well as LUE rotator cuff tear. Pt participates well and is motivated to regain functional independence in mobility and self care.      Rehab Potential is good    Activity Tolerance: Good    Plan:     Patient to be seen 5 x/week to address the above listed problems via self-care/home management, therapeutic activities, therapeutic exercises  Plan of Care Expires: 05/19/23  Plan of Care Reviewed with: patient    Subjective     Chief Complaint: wounds, weakness, LUE rotator cuff tear  Patient/Family Comments/goals: Get well    Occupational Profile:  Living Environment: Pt lives in [facility]  Previous level of function: Mod I to Ind  Roles and Routines: Exercise, watch TV  Equipment Used at Home:  Rollator as needed  Assistance upon Discharge: Available intermittently    Pain/Comfort:   No pain    Patients cultural, spiritual, Latter day conflicts given the current situation: yes    Objective:     Communicated with: RN prior to session.  Patient found HOB elevated with 1.5 L oxygen upon OT entry to room.    Occupational  Performance:          04/28/23 1641   Prior Functioning: Everyday Activities   Self Care Independent   Indoor Mobility (Ambulation) Independent   Stairs Unknown   Functional Cognition Independent   Prior Device Use Walker   Eating   Assistance Needed Independent   Physical Assistance Level No physical assistance   CARE Score - Eating 6   Eating Discharge Goal   Discharge Goal 9   Oral Hygiene   Assistance Needed Supervision   Physical Assistance Level No physical assistance   CARE Score - Oral Hygiene 4   Oral Hygiene Discharge Goal   Discharge Goal 9   Toileting Hygiene   Assistance Needed Physical assistance   Physical Assistance Level 25% or less   Comment   (CGA for balance during standing clothing management)   CARE Score - Toileting Hygiene 3   Toileting Hygiene Discharge Goal   Discharge Goal 6   Upper Body Dressing   Assistance Needed Physical assistance   Physical Assistance Level 25% or less   CARE Score - Upper Body Dressing 3   Upper Body Dressing Discharge Goal   Discharge Goal 9   Lower Body Dressing   Assistance Needed Physical assistance   Physical Assistance Level 26%-50%   Comment   (mostly due to LE bandages)   CARE Score - Lower Body Dressing 3   Lower Body Dressing Discharge Goal   Discharge Goal 9   Putting On/Taking Off Footwear   Assistance Needed Physical assistance   Physical Assistance Level 76% or more   Comment   (due to LE bandages)   CARE Score - Putting On/Taking Off Footwear 2   Putting On/Taking Off Footwear Discharge Goal   Discharge Goal 9   Sit to Lying   Assistance Needed Physical assistance   Physical Assistance Level 25% or less   CARE Score - Sit to Lying 3   Lying to Sitting on Side of Bed   Assistance Needed Incidental touching   Physical Assistance Level 25% or less   CARE Score - Lying to Sitting on Side of Bed 3   Sit to Stand   Assistance Needed Incidental touching   Physical Assistance Level 25% or less   Comment RW   CARE Score - Sit to Stand 3   Toilet Transfer    Assistance Needed Incidental touching   Physical Assistance Level 25% or less   CARE Score - Toilet Transfer 3       Cognitive/Visual Perceptual:  Cognitive/Psychosocial Skills:     -       Oriented to: Person, Place, Time, and Situation   -       Follows Commands/attention:Follows multistep  commands  -       Safety awareness/insight to disability: intact     Physical Exam:  Upper Extremity Range of Motion:     -       Right Upper Extremity: WFL  -       Left Upper Extremity: 30 degrees at shoulder; old rotator cuff tear  Upper Extremity Strength:    -       Right Upper Extremity: 3-/5  -       Left Upper Extremity: 2/5   Strength:    -       Right Upper Extremity: WFL  -       Left Upper Extremity: WFL  Fine Motor Coordination:    -       Intact  Gross motor coordination:   WFL    AMPAC 6 Click ADL:  AMPAC Total Score: 17    Treatment & Education:  Pt edu re OT role, POC and safety.  Pt performed bed mobility, transfers with RW, t/f on/off BSC over toilet, toileting and grooming tasks; pt requiring assistance for dressing due to L rotator cuff tear and LE wounds/bandages.    Patient left HOB elevated with all lines intact and call button in reach    GOALS:   Multidisciplinary Problems       Occupational Therapy Goals          Problem: Occupational Therapy    Goal Priority Disciplines Outcome Interventions   Occupational Therapy Goal     OT, PT/OT Ongoing, Progressing    Description: Goals to be met by: 5/19/23     Patient will increase functional independence with ADLs by performing:    UE Dressing with Stand-by Assistance.  LE Dressing with Stand-by Assistance and Assistive Devices as needed.  Grooming while standing at sink with Supervision.  Toileting from toilet with Supervision for hygiene and clothing management.   Bathing from  standing at sink with Stand-by Assistance.  Supine to sit with Supervision.  Step transfer with Supervision  Upper extremity exercise program with assistance as needed.                          History:     Past Medical History:   Diagnosis Date    Amblyopia of left eye 04/10/2013    Arthritis     Facet arthropathy, Lumbosacral    Atherosclerosis of aorta     Cataract     Central retinal vein occlusion of left eye     CKD (chronic kidney disease) stage 3, GFR 30-59 ml/min     Diabetes mellitus, type 2     Diabetic polyneuropathy 2022    Essential (primary) hypertension     Exotropia of both eyes 2013    recession RSR 5.0mm w/ adj; recession LR os 5.0 w/ adj; resect MR os  4.0mm    Hearing loss     History of resection of small bowel     Hypertensive retinopathy of both eyes     Hypoglycemia     Macular degeneration     OA (osteoarthritis) of shoulder     Right    Osteoporosis     Paroxysmal atrial fibrillation     Posterior vitreous detachment of both eyes     Psychiatric problem     Rhinitis     TIA (transient ischemic attack)          Past Surgical History:   Procedure Laterality Date    APPENDECTOMY      CARDIAC CATHETERIZATION      CATARACT EXTRACTION W/  INTRAOCULAR LENS IMPLANT Bilateral      SECTION, CLASSIC      CLOSURE OF LEFT ATRIAL APPENDAGE USING DEVICE N/A 2020    Procedure: Left atrial appendage closure device;  Surgeon: Abundio Curtis MD;  Location: [facility] CATH LAB;  Service: Cardiology;  Laterality: N/A;    HYSTERECTOMY      INNER EAR SURGERY      JOINT REPLACEMENT      LEFT KNEE REPLACEMENT IN 2013 -    OOPHORECTOMY      SINUS SURGERY      STRABISMUS SURGERY  13    RSR recession 5 mm, LLR recession 5 mm and LMR resection 4mm    STRABISMUS SURGERY  2014    recess LR OD 6mm    TONSILLECTOMY      watchman surgery N/A 2020       Time Tracking:     OT Date of Treatment: 23  OT Start Time: 1610  OT Stop Time: 1637  OT Total Time (min): 27 min    Billable Minutes:Evaluation 10 minutes  Self Care/Home Management 17 minutes    ZACHARIAH Wood  2023  Pager: [phone]

## 2023-04-28 NOTE — PROGRESS NOTES
[facility]                                  Skilled Nursing Facility                   Progress Note     Patient Name: Jenniffer Jimenez  YOB: 1932  MRN: [id number]  Room: DUGI521/GKDF777 A     Admit Date: 4/27/2023   GILES:      Principal Problem: Acute on chronic diastolic heart failure    HPI obtained from patient interview and chart review     Chief Complaint:   Establish Care/ Initial Visit      HPI:   Jenniffer Jimenez is a 89 y.o. female with PMH of cataract, [place], CKD, DM II, diabetic polyneuropathy, HTN, PAF, TIA presents to the ED For evaluation of fever. Admitted for AFib RVR and presumed cellulitis to right lower extremity. She was treated with IV Cardizem and IV lasix. Beta-blockers are listed as allergies. Cellulitis treated with IV vancomycin and Zosyn transitioned to Amoxicillin and doxycycline stop date 4/30. Plan to continue aggressive wound care. Arrange for follow up with [facility], Cardiology, and Wound Care clinic. Followup with Dr. Deshpande after discharge.      Patient will be treated at [facility] with PT and OT to improve functional status and ability to perform ADLs.     Interval History  All of today's labs reviewed and are listed below.  24 hr vital sign ranges listed below.  Patient endorses some shortness of breath and wheezing. Denies abdominal discomfort, nausea, or vomiting.  Daughter at bedside. Discussed plan with her and patient. Crackles noted at bases. Give additional dose of Lasix 20mg PO now.      Past Medical History: Patient has a past medical history of Amblyopia of left eye (04/10/2013), Arthritis, Atherosclerosis of aorta, Cataract, Central retinal vein occlusion of left eye, CKD (chronic kidney disease) stage 3, GFR 30-59 ml/min, Diabetes mellitus, type 2, Diabetic polyneuropathy (01/06/2022), Essential (primary) hypertension, Exotropia of both eyes (02/06/2013), Hearing  loss, History of resection of small bowel, Hypertensive retinopathy of both eyes, Hypoglycemia, Macular degeneration, OA (osteoarthritis) of shoulder, Osteoporosis, Paroxysmal atrial fibrillation, Posterior vitreous detachment of both eyes, Psychiatric problem, Rhinitis, and TIA (transient ischemic attack).    Past Surgical History: Patient has a past surgical history that includes Cardiac catheterization; Inner ear surgery; Sinus surgery; Tonsillectomy;  section, classic; Appendectomy; Strabismus surgery (13); Strabismus surgery (2014); Cataract extraction w/  intraocular lens implant (Bilateral); Hysterectomy; Oophorectomy; Joint replacement; Closure of left atrial appendage using device (N/A, 2020); and watchman surgery (N/A, 2020).    Social History: Patient reports that she quit smoking about 40 years ago. Her smoking use included cigarettes. She has never been exposed to tobacco smoke. She has never used smokeless tobacco. She reports that she does not currently use alcohol after a past usage of about 2.0 standard drinks per week. She reports that she does not use drugs.    Family History: family history includes Breast cancer in her maternal aunt; Diabetes in her brother and sister; Heart disease in her sister and sister; Hypertension in her father and mother; Liver disease in her sister; No Known Problems in her daughter, maternal grandfather, maternal grandmother, maternal uncle, paternal aunt, paternal grandfather, paternal grandmother, paternal uncle, son, son, and son.    Allergies: Patient is allergic to opioids - morphine analogues, tizanidine, tramadol, beta-blockers (beta-adrenergic blocking agts), morphine, opioids-meperidine and related, and ciprofloxacin.        Review of Systems   Constitutional:  Positive for malaise/fatigue. Negative for chills and fever.   HENT:  Negative for congestion, hearing loss and sore throat.    Eyes:  Negative for blurred vision and double  vision.   Respiratory:  Positive for cough, sputum production, shortness of breath and wheezing.    Cardiovascular:  Negative for chest pain, palpitations and leg swelling.   Gastrointestinal:  Negative for abdominal pain, constipation, diarrhea, heartburn, nausea and vomiting.   Genitourinary:  Negative for dysuria and urgency.   Musculoskeletal:  Positive for joint pain and myalgias. Negative for back pain.   Skin:  Negative for rash.        + wound   Neurological:  Positive for weakness. Negative for dizziness and headaches.   Endo/Heme/Allergies:  Negative for polydipsia. Does not bruise/bleed easily.   Psychiatric/Behavioral:  Negative for depression and hallucinations. The patient is not nervous/anxious.        24 hour Vital Sign Range   Temp:  [96.1 °F (35.6 °C)-98.6 °F (37 °C)]   Pulse:  [77-94]   Resp:  [18-20]   BP: (127-133)/(57-72)   SpO2:  [93 %-100 %]       Physical Exam  Vitals and nursing note reviewed.   Constitutional:       General: She is not in acute distress.     Appearance: Normal appearance. She is not ill-appearing.   HENT:      Head: Normocephalic and atraumatic.      Ears:      Comments: [place]     Nose: Nose normal. No congestion.      Mouth/Throat:      Mouth: Mucous membranes are moist.      Pharynx: Oropharynx is clear.   Eyes:      Extraocular Movements: Extraocular movements intact.      Conjunctiva/sclera: Conjunctivae normal.      Pupils: Pupils are equal, round, and reactive to light.   Cardiovascular:      Rate and Rhythm: Normal rate. Rhythm irregular.      Pulses: Normal pulses.      Heart sounds: Normal heart sounds. No murmur heard.  Pulmonary:      Effort: Pulmonary effort is normal. No respiratory distress.      Breath sounds: Wheezing and rhonchi present.   Abdominal:      General: Bowel sounds are normal. There is no distension.      Palpations: Abdomen is soft. There is no mass.      Tenderness: There is no abdominal tenderness.   Musculoskeletal:         General: Swelling  and tenderness present.      Cervical back: Normal range of motion and neck supple. No tenderness.      Right lower leg: Edema present.      Left lower leg: Edema present.      Comments: BLE   Skin:     General: Skin is warm and dry.      Capillary Refill: Capillary refill takes less than 2 seconds.      Findings: No erythema or rash.   Neurological:      Mental Status: She is alert and oriented to person, place, and time. Mental status is at baseline.      Motor: Weakness present.   Psychiatric:         Mood and Affect: Mood normal.         Behavior: Behavior normal.     Full skin assessment completed by this SEAN Schultz 4/28/23          Altered Skin Integrity Right anterior;lower;lateral;medial;posterior Leg (Active)       Altered Skin Integrity Present on Admission - Did Patient arrive to the hospital with altered skin?: yes   Side: Right   Orientation: anterior;lower;lateral;medial;posterior   Location: Leg   Dressing Appearance Clean;Dry;Intact    Drainage Amount Scant    Drainage Characteristics/Odor Clear;No odor    Appearance Red;Moist             Altered Skin Integrity Left lower;lateral Leg (Active)       Altered Skin Integrity Present on Admission - Did Patient arrive to the hospital with altered skin?: yes   Side: Left   Orientation: lower;lateral   Location: Leg   Dressing Appearance Clean;Dry;Intact    Drainage Amount None    Drainage Characteristics/Odor No odor    Appearance Dressing in place, unable to visualize         Altered Skin Integrity Buttocks Moisture associated dermatitis Intact skin with non-blanchable redness of localized area (Active)      Altered Skin Integrity Present on Admission - Did Patient arrive to the hospital with altered skin?: yes   Location: Buttocks   Is this injury device related?: No   Primary Wound Type: Moisture associated    Description of Altered Skin Integrity Intact skin with non-blanchable redness of localized area    Dressing Appearance Open to air    Drainage  Amount None    Drainage Characteristics/Odor No odor    Appearance Red;Dry    Periwound Area Intact;Dry             Rash Left anterior Groin (Active)      Present Prior to Hospital Arrival?: Yes   Side: Left   Orientation: anterior   Location: Groin   Distribution generalized    Characteristics redness/erythema    Color red             Rash Right anterior Groin (Active)   Present Prior to Hospital Arrival?: Yes   Side: Right   Orientation: anterior   Location: Groin   Distribution generalized    Characteristics dryness    Color red         Rash Back (Active)      Present Prior to Hospital Arrival?: Yes   Location: Back   Distribution generalized    Characteristics redness/erythema    Color red            Labs:  Recent Labs   Lab 04/23/23  0642 04/24/23  0529 04/27/23  0507   WBC 3.63* 3.35* 3.86*   HGB 7.9* 8.3* 7.9*   HCT 24.3* 25.4* 24.6*    185 199     Recent Labs   Lab 04/22/23  0349 04/23/23  0642 04/24/23  0529 04/25/23  0753 04/26/23  0331 04/27/23  0507   * 137 139 139 139 139   K 4.1 4.2 3.7 4.0 3.6 3.7    104 103 104 103 103   CO2 21* 23 26 24 25 25   BUN 27 26 28 22 22 18   CREATININE 1.2 1.2 1.3 1.0 0.9 0.9    95 95 89 100 100   CALCIUM 7.6* 7.9* 8.2* 8.5* 8.3* 8.3*   MG 2.0 2.0 1.9  --   --   --    PHOS 3.7 3.6 4.2  --   --   --      Recent Labs   Lab 04/22/23  0349 04/23/23  0642 04/24/23  0529   ALKPHOS 86 81 73   ALT 8* 9* 9*   AST 11 11 12   ALBUMIN 2.2* 2.3* 2.3*   PROT 5.3* 5.6* 5.7*   BILITOT 0.3 0.2 0.2   INR 1.1  --   --        Recent Labs     04/26/23  1208 04/26/23  1720 04/26/23  2024 04/27/23  0433 04/27/23  1150 04/27/23  2037 04/28/23  0912 04/28/23  1147   POCTGLUCOSE 84 121* 160* 112* 86 161* 151* 162*       Meds Scheduled:   acetaminophen  1,000 mg Oral BID    amoxicillin-clavulanate 875-125mg  1 tablet Oral Q12H    aspirin  81 mg Oral Daily    diclofenac sodium  2 g Topical (Top) QID    doxycycline  100 mg Oral Q12H    ferrous sulfate  1 tablet Oral Daily     "fluconazole  200 mg Oral Daily    fluticasone propionate  2 spray Each Nostril Daily    furosemide  20 mg Oral Once    furosemide  40 mg Oral Daily    guaiFENesin  600 mg Oral BID    miconazole nitrate 2%   Topical (Top) BID    pravastatin  40 mg Oral Daily    senna-docusate 8.6-50 mg  1 tablet Oral BID    silver sulfADIAZINE 1%   Topical (Top) BID    vitamin E  400 Units Oral Daily       PRN:   acetaminophen, albuterol-ipratropium, calcium carbonate, dextromethorphan-guaiFENesin  mg/5 ml, dextrose 10%, dextrose 10%, dextrose, dextrose, glucagon (human recombinant), melatonin      Assessment and Plan:       * Acute on chronic diastolic heart failure  The estimated ejection fraction is 55%  Continue Furosemide and monitor clinical status closely. Strict Is&Os and daily weights.    Fluid restriction of 1.5 L.   Low na diet   Continue Lasix 40mg daily   Give additional dose Lasix 20mg x one dose 4/28/23      Cellulitis BLE  Continue Amoxicillin and Doxycycline x 4 days end date 4/30    Community acquired pneumonia of left lower lobe of lung  Continue Amoxicillin and Doxycycline x 4 days       Microcytic anemia  Transfuse if Hgb < 7 or symptomatic       Presence of Watchman left atrial appendage closure device  No need for anticoagulation        Venous insufficiency of both lower extremities  Palpable R dorsalis pedal pulse     Prediabetes           Lab Results   Component Value Date     HGBA1C 6.0 (H) 01/26/2023      Consistent carb diet      History of TIA (transient ischemic attack)  Continue asa, statin      Stage 3b chronic kidney disease  Baseline      Chronic a-fib  [id number] Score: 4. HASBLED Score:  Anticoagulation not on anticoagulation due to watchman device, on asa only    BB on allergy list   Can use CCB (PO dilt) if rate control needed in light of BB "allergy"     Debility  Consult PT/OT      Primary hypertension  Hold medications for now; can resume as becomes hypertensive     Pure " hypercholesterolemia  -continue statin        Anticipate disposition:    Home with home health      Follow-up needed during SNF admission:   none    Follow-up needed after discharge from SNF:   -Cardiology and ID after discharge   - PCP within 1-2 weeks  - See appt scheduled below     Future Appointments   Date Time Provider Department Center   5/4/2023  3:00 PM KIMBERLY Lopez [facility] AUDIO [address]   6/15/2023 11:15 AM Lennie Louis DPM [facility] POD [address]   6/29/2023  8:30 AM Rachael Case MD [facility] IM [place]   7/12/2023 11:30 AM Brent Chapman Jr., MD [facility] CARDIO [place]         I certify that SNF services are required to be given on an inpatient basis because Jenniffer Jimenez needs for skilled nursing care and/or skilled rehabilitation are required on a daily basis and such services can only practically be provided in a skilled nursing facility setting and are for an ongoing condition for which she received inpatient care in the hospital.       Extended Visit:   Total time spent: 127 minutes  Description of Time: counseling provided on clinical condition, therapies provided, plan of are, emotional support, coordinating patient care with other care team members, reviewing and interpreting labs and imaging, collaboration with physician, initiating new orders, chart review, and documentation. See interval hx.         Rani Schultz NP  Department of Hospital Medicine   [facility]     DOS: 4/28/2023       Patient note was created using MModal Dictation.  Any errors in syntax or even information may not have been identified and edited on initial review prior to signing this note.

## 2023-04-28 NOTE — PLAN OF CARE
Problem: Physical Therapy  Goal: Physical Therapy Goal  Description: Goals to be met by: 5/28/23     Patient will increase functional independence with mobility by performing:    . Supine to sit with Set-up [place]  . Sit to supine with Set-up [place]  . Rolling to Left and Right with Modified [place].  . Sit to stand transfer with Supervision  . Bed to chair transfer with Supervision using Rolling Walker  . Gait  x 150 feet with Stand-by Assistance using Rolling Walker.   . Wheelchair propulsion x150 feet with Stand-by Assistance using bilateral uppper extremities  . Ascend/Descend 4 inch curb step with Contact Guard Assistance using Rolling Walker.    Outcome: Ongoing, Progressing

## 2023-04-28 NOTE — PROGRESS NOTES
IP Liaison - Final Visit Note    Patient: Jenniffer Jimenez  MRN:  [id number]  Date of Service:  4/28/2023  Completed by:  MICHAEL García    Reason for Visit   Patient presents with    IP Liaison Chart Review     Patient discharged from hospital before RSW was able to complete follow-up visit.        Patient Summary     Discharge Date: 4/27/2023  Discharge telephone number/address: [phone] / [facility]  Follow up provider: n/a  Follow up appointments: n/a  Home Health agency & telephone number: n/a  DME ordered &  name: n/a  Assigned OPCM RN/SW: n/a  Report sent to follow up team (PCP/OPCM) via in basket message: n/a  Community Resources arranged including agency name & contact info: [facility] information      MICHAEL García

## 2023-04-29 LAB
POCT GLUCOSE: 163 MG/DL (ref 70–110)
POCT GLUCOSE: 191 MG/DL (ref 70–110)
POCT GLUCOSE: 85 MG/DL (ref 70–110)
POCT GLUCOSE: 87 MG/DL (ref 70–110)

## 2023-04-29 PROCEDURE — 25000003 PHARM REV CODE 250: Mod: HCNC | Performed by: HOSPITALIST

## 2023-04-29 PROCEDURE — 99900035 HC TECH TIME PER 15 MIN (STAT): Mod: HCNC

## 2023-04-29 PROCEDURE — 97530 THERAPEUTIC ACTIVITIES: CPT | Mod: HCNC,CQ

## 2023-04-29 PROCEDURE — 94640 AIRWAY INHALATION TREATMENT: CPT | Mod: HCNC

## 2023-04-29 PROCEDURE — 11000004 HC SNF PRIVATE: Mod: HCNC

## 2023-04-29 PROCEDURE — 25000242 PHARM REV CODE 250 ALT 637 W/ HCPCS: Mod: HCNC | Performed by: HOSPITALIST

## 2023-04-29 PROCEDURE — 97116 GAIT TRAINING THERAPY: CPT | Mod: HCNC,CQ

## 2023-04-29 PROCEDURE — 25000003 PHARM REV CODE 250: Mod: HCNC | Performed by: NURSE PRACTITIONER

## 2023-04-29 PROCEDURE — 97110 THERAPEUTIC EXERCISES: CPT | Mod: HCNC,CQ

## 2023-04-29 RX ADMIN — MICONAZOLE NITRATE: 20 OINTMENT TOPICAL at 09:04

## 2023-04-29 RX ADMIN — FLUCONAZOLE 200 MG: 200 TABLET ORAL at 09:04

## 2023-04-29 RX ADMIN — SENNOSIDES AND DOCUSATE SODIUM 1 TABLET: 50; 8.6 TABLET ORAL at 09:04

## 2023-04-29 RX ADMIN — FERROUS SULFATE TAB 325 MG (65 MG ELEMENTAL FE) 1 EACH: 325 (65 FE) TAB at 09:04

## 2023-04-29 RX ADMIN — DICLOFENAC SODIUM 2 G: 10 GEL TOPICAL at 09:04

## 2023-04-29 RX ADMIN — DOXYCYCLINE HYCLATE 100 MG: 100 TABLET, COATED ORAL at 09:04

## 2023-04-29 RX ADMIN — DICLOFENAC SODIUM 2 G: 10 GEL TOPICAL at 12:04

## 2023-04-29 RX ADMIN — SILVER SULFADIAZINE: 10 CREAM TOPICAL at 09:04

## 2023-04-29 RX ADMIN — IPRATROPIUM BROMIDE AND ALBUTEROL SULFATE 3 ML: 2.5; .5 SOLUTION RESPIRATORY (INHALATION) at 10:04

## 2023-04-29 RX ADMIN — GUAIFENESIN 600 MG: 600 TABLET, EXTENDED RELEASE ORAL at 09:04

## 2023-04-29 RX ADMIN — FLUTICASONE PROPIONATE 100 MCG: 50 SPRAY, METERED NASAL at 09:04

## 2023-04-29 RX ADMIN — DICLOFENAC SODIUM 2 G: 10 GEL TOPICAL at 05:04

## 2023-04-29 RX ADMIN — AMOXICILLIN AND CLAVULANATE POTASSIUM 1 TABLET: 875; 125 TABLET, FILM COATED ORAL at 09:04

## 2023-04-29 RX ADMIN — ACETAMINOPHEN 1000 MG: 500 TABLET ORAL at 09:04

## 2023-04-29 RX ADMIN — PRAVASTATIN SODIUM 40 MG: 20 TABLET ORAL at 09:04

## 2023-04-29 RX ADMIN — FUROSEMIDE 40 MG: 40 TABLET ORAL at 09:04

## 2023-04-29 RX ADMIN — ASPIRIN 81 MG: 81 TABLET, COATED ORAL at 09:04

## 2023-04-29 RX ADMIN — Medication 400 UNITS: at 09:04

## 2023-04-29 NOTE — CONSULTS
"  [facility] - Skilled Nursing  Adult Nutrition  Consult Note    SUMMARY   Recommendations  Continue cardiac diet ,  Follow glucose and potassium,RD following  Goals: PO to meet 85% of assessed needs by next RD visit  Nutrition Goal Status: new  Communication of RD Recs: other (comment) (POC)    Assessment and Plan    No nutrition diagnosis at this time    Malnutrition Assessment 4/28     Skin (Micronutrient): dry, scaly, edema  Hair/Scalp (Micronutrient): dry           Orbital Region (Subcutaneous Fat Loss): mild depletion  Upper Arm Region (Subcutaneous Fat Loss): mild depletion   [place] Region (Muscle Loss): mild depletion  Clavicle and Acromion Bone Region (Muscle Loss): mild depletion  Dorsal Hand (Muscle Loss): mild depletion                 Reason for Assessment    Reason For Assessment: consult  Diagnosis:  (HF, pneumonia)  Relevant Medical History: cellulitis bilateral legs, TIA, OA, DM, CKD3, HTN, HLD, HF, AFIB, neuropathy,  Interdisciplinary Rounds: did not attend  General Information Comments: Lives in senior apartment complex, has meals provided, also has meal on wheels delivered weekdays, lives alone with cat. no food allergies, own teeth no chewing problems but swallowing issue today. Patient states she feels the facility tries to accomadate her low sodium diet.  Hard of hearing.  Nutrition Discharge Planning: Dc on cardiac diet diabetic diet  Nutrition/Diet History    Patient Reported Diet/Restrictions/Preferences: low salt  Typical Food/Fluid Intake: 2-3 meals provided  Food Preferences: likes salads  Spiritual, Cultural Beliefs, Mosque Practices, Values that Affect Care: no  Food Allergies: NKFA  Factors Affecting Nutritional Intake: None identified at this time    Anthropometrics    Temp: 96.1 °F (35.6 °C)  Height Method: Stated  Height: 5' 5" (165.1 cm)  Height (inches): 65 in  Weight Method: Bed Scale  Weight: 70.2 kg (154 lb 12.2 oz)  Weight (lb): 154.76 lb  Ideal Body Weight (IBW), Female: " 125 lb  % Ideal Body Weight, Female (lb): 123.81 %  BMI (Calculated): 25.8  BMI Grade: 25 - 29.9 - overweight  Weight Loss: intentional  Usual Body Weight (UBW), k.7 kg  Weight Change Amount:  (8% weight loss this past year, intentional, recent weight gain 5% was from fluid extremities)  % Usual Body Weight: 90.54  % Weight Change From Usual Weight: -9.65 %       Lab/Procedures/Meds    Pertinent Labs Reviewed: reviewed  Pertinent Labs Comments: .6, Hg 7.9, Hct 24.6, HgA1c 5.9  Pertinent Medications Reviewed: reviewed  Pertinent Medications Comments: Abx, ASA, Fe, lasix, Vit E, senna-docusate, statin    Estimated/Assessed Needs    Weight Used For Calorie Calculations: 70.2 kg (154 lb 12.2 oz)  Energy Calorie Requirements (kcal): 1453  Energy Need Method: Aydlett-St Jeor (x 1.3(PAL))  Protein Requirements: 70  Weight Used For Protein Calculations: 70.2 kg (154 lb 12.2 oz)  Fluid Requirements (mL): 1453 or per MD  Estimated Fluid Requirement Method: RDA Method  RDA Method (mL): 1453  CHO Requirement: 181      Nutrition Prescription Ordered    Current Diet Order: Cardiac  Nutrition Order Comments: PO %  Oral Nutrition Supplement: -    Evaluation of Received Nutrient/Fluid Intake    I/O: no data  Energy Calories Required: meeting needs  Fluid Required: meeting needs  Comments: LBM   Tolerance: tolerating  % Intake of Estimated Energy Needs: %  % Meal Intake: 75 - 100 %    Nutrition Risk    Level of Risk/Frequency of Follow-up: low (one time per week)       Monitor and Evaluation    Food and Nutrient Intake: food and beverage intake  Food and Nutrient Adminstration: diet order  Anthropometric Measurements: weight change  Biochemical Data, Medical Tests and Procedures: gastrointestinal profile, electrolyte and renal panel, glucose/endocrine profile  Nutrition-Focused Physical Findings: skin       Nutrition Follow-Up    RD Follow-up?: Yes

## 2023-04-30 LAB
POCT GLUCOSE: 103 MG/DL (ref 70–110)
POCT GLUCOSE: 140 MG/DL (ref 70–110)
POCT GLUCOSE: 324 MG/DL (ref 70–110)
POCT GLUCOSE: 85 MG/DL (ref 70–110)

## 2023-04-30 PROCEDURE — 25000003 PHARM REV CODE 250: Mod: HCNC | Performed by: NURSE PRACTITIONER

## 2023-04-30 PROCEDURE — 25000003 PHARM REV CODE 250: Mod: HCNC | Performed by: HOSPITALIST

## 2023-04-30 PROCEDURE — 97110 THERAPEUTIC EXERCISES: CPT | Mod: HCNC,CO

## 2023-04-30 PROCEDURE — 94761 N-INVAS EAR/PLS OXIMETRY MLT: CPT | Mod: HCNC

## 2023-04-30 PROCEDURE — 97530 THERAPEUTIC ACTIVITIES: CPT | Mod: HCNC,CO

## 2023-04-30 PROCEDURE — 11000004 HC SNF PRIVATE: Mod: HCNC

## 2023-04-30 RX ADMIN — DICLOFENAC SODIUM 2 G: 10 GEL TOPICAL at 04:04

## 2023-04-30 RX ADMIN — DICLOFENAC SODIUM 2 G: 10 GEL TOPICAL at 09:04

## 2023-04-30 RX ADMIN — MICONAZOLE NITRATE: 20 OINTMENT TOPICAL at 09:04

## 2023-04-30 RX ADMIN — SILVER SULFADIAZINE: 10 CREAM TOPICAL at 10:04

## 2023-04-30 RX ADMIN — MICONAZOLE NITRATE: 20 OINTMENT TOPICAL at 10:04

## 2023-04-30 RX ADMIN — ASPIRIN 81 MG: 81 TABLET, COATED ORAL at 09:04

## 2023-04-30 RX ADMIN — ACETAMINOPHEN 1000 MG: 500 TABLET ORAL at 09:04

## 2023-04-30 RX ADMIN — AMOXICILLIN AND CLAVULANATE POTASSIUM 1 TABLET: 875; 125 TABLET, FILM COATED ORAL at 09:04

## 2023-04-30 RX ADMIN — GUAIFENESIN 600 MG: 600 TABLET, EXTENDED RELEASE ORAL at 10:04

## 2023-04-30 RX ADMIN — SENNOSIDES AND DOCUSATE SODIUM 1 TABLET: 50; 8.6 TABLET ORAL at 09:04

## 2023-04-30 RX ADMIN — FLUCONAZOLE 200 MG: 200 TABLET ORAL at 09:04

## 2023-04-30 RX ADMIN — FLUTICASONE PROPIONATE 100 MCG: 50 SPRAY, METERED NASAL at 09:04

## 2023-04-30 RX ADMIN — SILVER SULFADIAZINE: 10 CREAM TOPICAL at 11:04

## 2023-04-30 RX ADMIN — DOXYCYCLINE HYCLATE 100 MG: 100 TABLET, COATED ORAL at 09:04

## 2023-04-30 RX ADMIN — DICLOFENAC SODIUM 2 G: 10 GEL TOPICAL at 01:04

## 2023-04-30 RX ADMIN — DOXYCYCLINE HYCLATE 100 MG: 100 TABLET, COATED ORAL at 10:04

## 2023-04-30 RX ADMIN — ACETAMINOPHEN 1000 MG: 500 TABLET ORAL at 10:04

## 2023-04-30 RX ADMIN — PRAVASTATIN SODIUM 40 MG: 20 TABLET ORAL at 09:04

## 2023-04-30 RX ADMIN — AMOXICILLIN AND CLAVULANATE POTASSIUM 1 TABLET: 875; 125 TABLET, FILM COATED ORAL at 10:04

## 2023-04-30 RX ADMIN — GUAIFENESIN 600 MG: 600 TABLET, EXTENDED RELEASE ORAL at 09:04

## 2023-04-30 RX ADMIN — DICLOFENAC SODIUM 2 G: 10 GEL TOPICAL at 10:04

## 2023-04-30 RX ADMIN — FERROUS SULFATE TAB 325 MG (65 MG ELEMENTAL FE) 1 EACH: 325 (65 FE) TAB at 09:04

## 2023-04-30 RX ADMIN — FUROSEMIDE 40 MG: 40 TABLET ORAL at 09:04

## 2023-04-30 RX ADMIN — Medication 400 UNITS: at 09:04

## 2023-04-30 NOTE — PLAN OF CARE
Problem: Adult Inpatient Plan of Care  Goal: Plan of Care Review  Outcome: Ongoing, Progressing  Flowsheets (Taken 4/30/2023 5094)  Plan of Care Reviewed With: patient     Problem: Diabetes Comorbidity  Goal: Blood Glucose Level Within Targeted Range  Outcome: Ongoing, Progressing     Problem: Fluid Imbalance (Pneumonia)  Goal: Fluid Balance  Outcome: Ongoing, Progressing     Problem: Infection (Pneumonia)  Goal: Resolution of Infection Signs and Symptoms  Outcome: Ongoing, Progressing     Problem: Respiratory Compromise (Pneumonia)  Goal: Effective Oxygenation and Ventilation  Outcome: Ongoing, Progressing     Problem: Impaired Wound Healing  Goal: Optimal Wound Healing  Outcome: Ongoing, Progressing     Problem: Fall Injury Risk  Goal: Absence of Fall and Fall-Related Injury  Outcome: Ongoing, Progressing     Problem: Skin Injury Risk Increased  Goal: Skin Health and Integrity  Outcome: Ongoing, Progressing

## 2023-04-30 NOTE — PLAN OF CARE
Problem: Adult Inpatient Plan of Care  Goal: Plan of Care Review  Outcome: Ongoing, Progressing  Goal: Patient-Specific Goal (Individualized)  Outcome: Ongoing, Progressing  Goal: Absence of Hospital-Acquired Illness or Injury  Outcome: Ongoing, Progressing  Goal: Optimal Comfort and Wellbeing  Outcome: Ongoing, Progressing     Problem: Diabetes Comorbidity  Goal: Blood Glucose Level Within Targeted Range  Outcome: Ongoing, Progressing     Problem: Impaired Wound Healing  Goal: Optimal Wound Healing  Outcome: Ongoing, Progressing     Problem: Fall Injury Risk  Goal: Absence of Fall and Fall-Related Injury  Outcome: Ongoing, Progressing

## 2023-04-30 NOTE — PT/OT/SLP PROGRESS
Occupational Therapy   Treatment    Name: Jenniffer Jimenez  MRN: [id number]  Admit Date: 4/27/2023  Admitting Diagnosis:  Acute on chronic diastolic heart failure    General Precautions: Standard, hearing impaired, fall   Orthopedic Precautions: N/A   Braces: N/A    Recommendations:     Discharge Recommendations:  assisted living facility  Level of Assistance Recommended at Discharge: Intermittent assistance for ADL's and homemaking tasks  Discharge Equipment Recommendations: none  Barriers to discharge:  None    Assessment:     Jenniffer Jimenez is a 89 y.o. female with a medical diagnosis of Acute on chronic diastolic heart failure .  She presents with performance deficits affecting function are weakness, impaired endurance, impaired self care skills, impaired functional mobility, gait instability, impaired balance, decreased upper extremity function, decreased lower extremity function, decreased ROM, impaired cardiopulmonary response to activity.  Pt tolerated session well and without incident, but she continues to require assistance to perform self-care tasks and mobility.  She would continue to benefit from skilled OT services at SNF to maximize her gains in functional independence.      Rehab Potential is good    Activity tolerance:  Good    Plan:     Patient to be seen 5 x/week to address the above listed problems via self-care/home management, therapeutic activities, therapeutic exercises    Plan of Care Expires: 05/19/23  Plan of Care Reviewed with: patient    Subjective     Communicated with: pt agreeable to session.      Pain/Comfort:  Pain Rating 1: 0/10  Pain Rating Post-Intervention 1: 0/10    Patient's cultural, spiritual, Zoroastrianism conflicts given the current situation:  yes    Objective:     Patient found  seated on BSC  with no active lines upon OT entry to room.    Functional Mobility/Transfers:  Patient completed Sit <> Stand Transfer with contact guard assistance  with  rolling walker    Patient completed Bed <> Chair Transfer using Stand Pivot technique with contact guard assistance with rolling walker  Patient completed Toilet Transfer Stand Pivot technique with contact guard assistance with  grab bars    Activities of Daily Living:  Upper Body Dressing: pt donned gown with minimum assistance   Toileting: maximal assistance standing using grab bar  Grooming: pt performed face washing, hair grooming and oral hygiene with Supervision sitting at sink in w/c    [facility] 6 Click ADL: 17    Therapeutic Activity:   Pt performed standing balance with CGA using RW while performing unilateral ax crossing midline and weight shifting to increase standing balance and tolerance for ADLs and functional mobility     Treatment & Education:  ADLs, functional mobility, safety awareness, standing balance/tolerance, OT roles and goals     Patient left up in chair with call button in reach    GOALS:   Multidisciplinary Problems       Occupational Therapy Goals          Problem: Occupational Therapy    Goal Priority Disciplines Outcome Interventions   Occupational Therapy Goal     OT, PT/OT Ongoing, Progressing    Description: Goals to be met by: 5/19/23     Patient will increase functional independence with ADLs by performing:    UE Dressing with Stand-by Assistance.  LE Dressing with Stand-by Assistance and Assistive Devices as needed.  Grooming while standing at sink with Supervision.  Toileting from toilet with Supervision for hygiene and clothing management.   Bathing from  standing at sink with Stand-by Assistance.  Supine to sit with Supervision.  Step transfer with Supervision  Upper extremity exercise program with assistance as needed.                         Time Tracking:     OT Date of Treatment: 04/30/23  OT Start Time: 0914    OT Stop Time: 0952  OT Total Time (min): 38 min    Billable Minutes:Self Care/Home Management 14  Therapeutic Activity 24    4/30/2023

## 2023-05-01 LAB
ANION GAP SERPL CALC-SCNC: 10 MMOL/L (ref 8–16)
BASOPHILS # BLD AUTO: 0.03 K/UL (ref 0–0.2)
BASOPHILS NFR BLD: 0.6 % (ref 0–1.9)
BUN SERPL-MCNC: 34 MG/DL (ref 10–30)
CALCIUM SERPL-MCNC: 8.5 MG/DL (ref 8.7–10.5)
CHLORIDE SERPL-SCNC: 101 MMOL/L (ref 95–110)
CO2 SERPL-SCNC: 25 MMOL/L (ref 23–29)
CREAT SERPL-MCNC: 1.3 MG/DL (ref 0.5–1.4)
DIFFERENTIAL METHOD: ABNORMAL
EOSINOPHIL # BLD AUTO: 0.1 K/UL (ref 0–0.5)
EOSINOPHIL NFR BLD: 1.6 % (ref 0–8)
ERYTHROCYTE [DISTWIDTH] IN BLOOD BY AUTOMATED COUNT: 18.6 % (ref 11.5–14.5)
EST. GFR  (NO RACE VARIABLE): 38.8 ML/MIN/1.73 M^2
GLUCOSE SERPL-MCNC: 88 MG/DL (ref 70–110)
HCT VFR BLD AUTO: 24.1 % (ref 37–48.5)
HGB BLD-MCNC: 7.6 G/DL (ref 12–16)
IMM GRANULOCYTES # BLD AUTO: 0.07 K/UL (ref 0–0.04)
IMM GRANULOCYTES NFR BLD AUTO: 1.4 % (ref 0–0.5)
LYMPHOCYTES # BLD AUTO: 0.9 K/UL (ref 1–4.8)
LYMPHOCYTES NFR BLD: 17.7 % (ref 18–48)
MAGNESIUM SERPL-MCNC: 1.9 MG/DL (ref 1.6–2.6)
MCH RBC QN AUTO: 27.7 PG (ref 27–31)
MCHC RBC AUTO-ENTMCNC: 31.5 G/DL (ref 32–36)
MCV RBC AUTO: 88 FL (ref 82–98)
MONOCYTES # BLD AUTO: 0.5 K/UL (ref 0.3–1)
MONOCYTES NFR BLD: 10.9 % (ref 4–15)
NEUTROPHILS # BLD AUTO: 3.3 K/UL (ref 1.8–7.7)
NEUTROPHILS NFR BLD: 67.8 % (ref 38–73)
NRBC BLD-RTO: 0 /100 WBC
PHOSPHATE SERPL-MCNC: 4.2 MG/DL (ref 2.7–4.5)
PLATELET # BLD AUTO: 230 K/UL (ref 150–450)
PMV BLD AUTO: 10.8 FL (ref 9.2–12.9)
POCT GLUCOSE: 117 MG/DL (ref 70–110)
POCT GLUCOSE: 137 MG/DL (ref 70–110)
POCT GLUCOSE: 148 MG/DL (ref 70–110)
POCT GLUCOSE: 97 MG/DL (ref 70–110)
POTASSIUM SERPL-SCNC: 4.9 MMOL/L (ref 3.5–5.1)
RBC # BLD AUTO: 2.74 M/UL (ref 4–5.4)
SODIUM SERPL-SCNC: 136 MMOL/L (ref 136–145)
WBC # BLD AUTO: 4.86 K/UL (ref 3.9–12.7)

## 2023-05-01 PROCEDURE — 94667 MNPJ CHEST WALL 1ST: CPT | Mod: HCNC

## 2023-05-01 PROCEDURE — 85025 COMPLETE CBC W/AUTO DIFF WBC: CPT | Mod: HCNC | Performed by: HOSPITALIST

## 2023-05-01 PROCEDURE — 11000004 HC SNF PRIVATE: Mod: HCNC

## 2023-05-01 PROCEDURE — 94640 AIRWAY INHALATION TREATMENT: CPT | Mod: HCNC

## 2023-05-01 PROCEDURE — 83735 ASSAY OF MAGNESIUM: CPT | Mod: HCNC | Performed by: HOSPITALIST

## 2023-05-01 PROCEDURE — 25000003 PHARM REV CODE 250: Mod: HCNC | Performed by: NURSE PRACTITIONER

## 2023-05-01 PROCEDURE — 97110 THERAPEUTIC EXERCISES: CPT | Mod: HCNC,CQ

## 2023-05-01 PROCEDURE — 97530 THERAPEUTIC ACTIVITIES: CPT | Mod: HCNC,CQ

## 2023-05-01 PROCEDURE — 80048 BASIC METABOLIC PNL TOTAL CA: CPT | Mod: HCNC | Performed by: HOSPITALIST

## 2023-05-01 PROCEDURE — 36415 COLL VENOUS BLD VENIPUNCTURE: CPT | Mod: HCNC | Performed by: HOSPITALIST

## 2023-05-01 PROCEDURE — 84100 ASSAY OF PHOSPHORUS: CPT | Mod: HCNC | Performed by: HOSPITALIST

## 2023-05-01 PROCEDURE — 25000003 PHARM REV CODE 250: Mod: HCNC | Performed by: HOSPITALIST

## 2023-05-01 PROCEDURE — 25000242 PHARM REV CODE 250 ALT 637 W/ HCPCS: Mod: HCNC | Performed by: HOSPITALIST

## 2023-05-01 PROCEDURE — 97535 SELF CARE MNGMENT TRAINING: CPT | Mod: HCNC,CO

## 2023-05-01 PROCEDURE — 97116 GAIT TRAINING THERAPY: CPT | Mod: HCNC,CQ

## 2023-05-01 RX ORDER — HYDROCORTISONE 1 %
CREAM (GRAM) TOPICAL 2 TIMES DAILY
Status: DISCONTINUED | OUTPATIENT
Start: 2023-05-01 | End: 2023-05-12 | Stop reason: HOSPADM

## 2023-05-01 RX ORDER — ASCORBIC ACID 250 MG
250 TABLET ORAL DAILY
Status: DISCONTINUED | OUTPATIENT
Start: 2023-05-01 | End: 2023-05-12 | Stop reason: HOSPADM

## 2023-05-01 RX ORDER — ZINC SULFATE 50(220)MG
220 CAPSULE ORAL DAILY
Status: DISCONTINUED | OUTPATIENT
Start: 2023-05-01 | End: 2023-05-12 | Stop reason: HOSPADM

## 2023-05-01 RX ADMIN — GUAIFENESIN 600 MG: 600 TABLET, EXTENDED RELEASE ORAL at 09:05

## 2023-05-01 RX ADMIN — FUROSEMIDE 40 MG: 40 TABLET ORAL at 09:05

## 2023-05-01 RX ADMIN — AMOXICILLIN AND CLAVULANATE POTASSIUM 1 TABLET: 875; 125 TABLET, FILM COATED ORAL at 09:05

## 2023-05-01 RX ADMIN — DICLOFENAC SODIUM 2 G: 10 GEL TOPICAL at 01:05

## 2023-05-01 RX ADMIN — DICLOFENAC SODIUM 2 G: 10 GEL TOPICAL at 09:05

## 2023-05-01 RX ADMIN — ZINC SULFATE 220 MG (50 MG) CAPSULE 220 MG: CAPSULE at 03:05

## 2023-05-01 RX ADMIN — FERROUS SULFATE TAB 325 MG (65 MG ELEMENTAL FE) 1 EACH: 325 (65 FE) TAB at 09:05

## 2023-05-01 RX ADMIN — ACETAMINOPHEN 1000 MG: 500 TABLET ORAL at 09:05

## 2023-05-01 RX ADMIN — IPRATROPIUM BROMIDE AND ALBUTEROL SULFATE 3 ML: 2.5; .5 SOLUTION RESPIRATORY (INHALATION) at 08:05

## 2023-05-01 RX ADMIN — GUAIFENESIN AND DEXTROMETHORPHAN HYDROBROMIDE 1 TABLET: 600; 30 TABLET, EXTENDED RELEASE ORAL at 08:05

## 2023-05-01 RX ADMIN — DICLOFENAC SODIUM 2 G: 10 GEL TOPICAL at 05:05

## 2023-05-01 RX ADMIN — ACETAMINOPHEN 1000 MG: 500 TABLET ORAL at 08:05

## 2023-05-01 RX ADMIN — Medication 400 UNITS: at 09:05

## 2023-05-01 RX ADMIN — FLUTICASONE PROPIONATE 100 MCG: 50 SPRAY, METERED NASAL at 09:05

## 2023-05-01 RX ADMIN — SILVER SULFADIAZINE: 10 CREAM TOPICAL at 09:05

## 2023-05-01 RX ADMIN — Medication 250 MG: at 03:05

## 2023-05-01 RX ADMIN — PRAVASTATIN SODIUM 40 MG: 20 TABLET ORAL at 09:05

## 2023-05-01 RX ADMIN — DOXYCYCLINE HYCLATE 100 MG: 100 TABLET, COATED ORAL at 09:05

## 2023-05-01 RX ADMIN — FLUCONAZOLE 200 MG: 200 TABLET ORAL at 09:05

## 2023-05-01 RX ADMIN — SILVER SULFADIAZINE: 10 CREAM TOPICAL at 02:05

## 2023-05-01 RX ADMIN — MICONAZOLE NITRATE: 20 OINTMENT TOPICAL at 09:05

## 2023-05-01 RX ADMIN — THERA TABS 1 TABLET: TAB at 03:05

## 2023-05-01 RX ADMIN — ASPIRIN 81 MG: 81 TABLET, COATED ORAL at 09:05

## 2023-05-01 RX ADMIN — MICONAZOLE NITRATE: 20 OINTMENT TOPICAL at 08:05

## 2023-05-01 NOTE — PT/OT/SLP PROGRESS
"Occupational Therapy   Treatment    Name: Jenniffer Jimenez  MRN: [id number]  Admit Date: 4/27/2023  Admitting Diagnosis:  Acute on chronic diastolic heart failure    General Precautions: Standard, fall, hearing impaired   Orthopedic Precautions: N/A   Braces: N/A    Recommendations:     Discharge Recommendations:  assisted living facility  Level of Assistance Recommended at Discharge: Intermittent assistance for ADL's and homemaking tasks  Discharge Equipment Recommendations: none  Barriers to discharge:  None    Assessment:     Jenniffer Jimenez is a 89 y.o. female with a medical diagnosis of Acute on chronic diastolic heart failure .  She presents with performance deficits affecting function are weakness, impaired endurance, impaired self care skills, impaired functional mobility, gait instability, impaired balance, decreased upper extremity function, decreased lower extremity function, decreased ROM, impaired cardiopulmonary response to activity.     Pt participated well during today's session. Pt tolerated tx session without incident and is making progress, however, continues to demonstrate deficits with self care skills, balance, functional mobility, UB strength and endurance. Pt will benefit from continued OT services to progress towards goals.     Rehab Potential is good    Activity tolerance:  Good    Plan:     Patient to be seen 5 x/week to address the above listed problems via self-care/home management, therapeutic activities, therapeutic exercises    Plan of Care Expires: 05/19/23  Plan of Care Reviewed with: patient    Subjective   "Good morning, can we get dressed?"  Communicated with: Octavia prior to session.  .    Pain/Comfort:  Pain Rating 1: 0/10  Pain Rating Post-Intervention 1: 0/10    Patient's cultural, spiritual, Mandaen conflicts given the current situation:  yes    Objective:     Patient found supine upon OT entry to room.    Bed Mobility:    Patient completed Rolling/Turning to Right with " stand by assistance  Patient completed Scooting/Bridging with stand by assistance  Patient completed Supine to Sit with stand by assistance     Functional Mobility/Transfers:  Patient completed Sit <> Stand Transfer x 3 with contact guard assistance  with  rolling walker   Patient completed Bed <> Chair Transfer using Step Transfer technique with contact guard assistance with rolling walker  Patient completed Toilet Transfer Step Transfer technique with contact guard assistance with  rolling walker    Activities of Daily Living:  Feeding:  set up breakfast tray. Open containers and utensils.    Grooming: modified independence to perform oral/facial hygiene and apply makeup seated at sink.   Bathing: stand by assistance to perform sponge bath at sink.   Upper Body Dressing: minimum assistance to doff gown and don bra and pullover shirt.   Lower Body Dressing: minimum assistance to thread B LE into pants and brief with use of AE. (A) provided in stance to manage pants over hips.    Toileting: maximal assistance to perform nelida hygiene to rear region in stance wth use of grab bar and RW for balance and safety.     [facility] 6 Click ADL: 17    Treatment & Education:  Pt and family educated on:  - role of OT  - level of assistance  - adaptive equipment  -  safety while performing functional transfers and self care tasks,   - progress towards OT goals.     Patient left up in chair with call button in reach    GOALS:   Multidisciplinary Problems       Occupational Therapy Goals          Problem: Occupational Therapy    Goal Priority Disciplines Outcome Interventions   Occupational Therapy Goal     OT, PT/OT Ongoing, Progressing    Description: Goals to be met by: 5/19/23     Patient will increase functional independence with ADLs by performing:    UE Dressing with Stand-by Assistance.  LE Dressing with Stand-by Assistance and Assistive Devices as needed.  Grooming while standing at sink with Supervision.  Toileting from toilet  with Supervision for hygiene and clothing management.   Bathing from  standing at sink with Stand-by Assistance.  Supine to sit with Supervision.  Step transfer with Supervision  Upper extremity exercise program with assistance as needed.                         Time Tracking:     OT Date of Treatment: 05/01/23  OT Start Time: 0753    OT Stop Time: 0827  OT Total Time (min): 34 min    Billable Minutes:Self Care/Home Management 34    5/1/2023

## 2023-05-01 NOTE — PLAN OF CARE
Problem: Occupational Therapy  Goal: Occupational Therapy Goal  Description: Goals to be met by: 5/19/23     Patient will increase functional independence with ADLs by performing:    UE Dressing with Stand-by Assistance.  LE Dressing with Stand-by Assistance and Assistive Devices as needed.  Grooming while standing at sink with Supervision.  Toileting from toilet with Supervision for hygiene and clothing management.   Bathing from  standing at sink with Stand-by Assistance.  Supine to sit with Supervision.  Step transfer with Supervision  Upper extremity exercise program with assistance as needed.    Outcome: Ongoing, Progressing

## 2023-05-01 NOTE — CLINICAL REVIEW
"Clinical Pharmacy Chart Review Note      Admit Date: 4/27/2023   LOS: 4 days       Jenniffer Jimenez is a 89 y.o. female admitted to SNF for PT/OT after hospitalization for acute on chronic diastolic heart failure.    Active Hospital Problems    Diagnosis  POA    *Acute on chronic diastolic heart failure [I50.33]  Yes      Resolved Hospital Problems   No resolved problems to display.     Review of patient's allergies indicates:   Allergen Reactions    Opioids - morphine analogues Other (See Comments)     Bowel issues; bowel obstruction    Tizanidine Other (See Comments)     "Lips were numb,  Almost passed out."    Tramadol Hallucinations    Beta-blockers (beta-adrenergic blocking agts) Other (See Comments)     Can not go on beta blockers for long period of time - due to taking allergy injections    Morphine     Opioids-meperidine and related     Ciprofloxacin Rash     Patient Active Problem List    Diagnosis Date Noted    Community acquired pneumonia of left lower lobe of lung 04/26/2023    Heart failure with preserved ejection fraction 04/24/2023    Microcytic anemia 04/22/2023    Polyneuropathy in diseases classified elsewhere 03/30/2023    Pulmonary hypertension 01/26/2023    History of diabetes mellitus 01/26/2023    Delirium due to medical condition with behavioral disturbance 01/25/2023    High anion gap metabolic acidosis 01/12/2023    Generalized skin eruption due to medication taken internally 01/11/2023    Intertrigo 01/08/2023    Trochanteric bursitis of right hip 01/06/2023    Hyperkalemia 01/05/2023    Unable to care for self 11/12/2022    Multiple open wounds of right lower extremity 11/08/2022    Normocytic normochromic anemia 10/21/2022    Tear of left supraspinatus tendon 02/23/2022    Weakness of shoulder 02/23/2022    Impaired function of upper extremity 02/23/2022    Senile purpura 01/10/2022    Skin tear of forearm without complication, left, initial encounter 01/10/2022    Diabetic " polyneuropathy 01/06/2022    Cervicogenic headache 08/10/2021    Normocytic anemia 12/17/2020    Presence of Watchman left atrial appendage closure device 12/15/2020    Unspecified inflammatory spondylopathy, lumbosacral region 10/21/2020    Hypertensive retinopathy of both eyes     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 eye with active choroidal neovascularization 08/11/2020    BRVO (branch retinal vein occlusion) 06/25/2020    Venous insufficiency of both lower extremities 11/22/2019    OAB (overactive bladder) 11/04/2019    Overweight (BMI 25.0-29.9) 01/21/2019    Risk for falls 01/21/2019    Refractive error 11/14/2018    Posterior vitreous detachment, bilateral 11/14/2018    Dry eye syndrome 11/14/2018    Prediabetes 03/31/2017    Encounter for long-term (current) use of antiplatelets/antithrombotics 10/19/2016    History of strabismus surgery 07/20/2016    Primary osteoarthritis of right shoulder 05/11/2016    Stage 3b chronic kidney disease 05/04/2016    Atherosclerosis of aorta 05/04/2016    Acute on chronic diastolic heart failure 05/04/2016    History of TIA (transient ischemic attack) 05/04/2016    Osteoporosis 05/04/2016    Chronic a-fib 01/29/2016    Hypermetropia of right eye 09/10/2015    Facet arthropathy, lumbosacral 06/20/2014    Lumbar spinal stenosis 06/20/2014    Meniere's disease 04/02/2014    Gait instability 06/12/2013    Debility 04/25/2013    Exotropia of both eyes 02/06/2013    Primary hypertension     Arthritis     Hearing loss, sensorineural 10/29/2012    Chronic rhinitis 10/24/2012    Stable central retinal vein occlusion of left eye 10/15/2012    Pseudophakia, both eyes 10/11/2012    Pure hypercholesterolemia 07/02/2012       Scheduled Meds:    acetaminophen  1,000 mg Oral BID    amoxicillin-clavulanate 875-125mg  1 tablet Oral Q12H    aspirin  81 mg Oral Daily    diclofenac sodium  2 g Topical (Top) QID    doxycycline  100 mg Oral Q12H    ferrous sulfate  1 tablet Oral  Daily    fluconazole  200 mg Oral Daily    fluticasone propionate  2 spray Each Nostril Daily    furosemide  40 mg Oral Daily    guaiFENesin  600 mg Oral BID    miconazole nitrate 2%   Topical (Top) BID    pravastatin  40 mg Oral Daily    senna-docusate 8.6-50 mg  1 tablet Oral BID    silver sulfADIAZINE 1%   Topical (Top) BID    vitamin E  400 Units Oral Daily     Continuous Infusions:   PRN Meds: acetaminophen, albuterol-ipratropium, calcium carbonate, dextromethorphan-guaiFENesin  mg/5 ml, dextrose 10%, dextrose 10%, dextrose, dextrose, glucagon (human recombinant), melatonin    OBJECTIVE:     Vital Signs (Last 24H)  Temp:  [97.8 °F (36.6 °C)]   Pulse:  [68]   Resp:  [17]   BP: (122)/(57)   SpO2:  [97 %]     Laboratory:  CBC:   Recent Labs   Lab 04/27/23  0507 05/01/23 0445   WBC 3.86* 4.86   RBC 2.90* 2.74*   HGB 7.9* 7.6*   HCT 24.6* 24.1*    230   MCV 85 88   MCH 27.2 27.7   MCHC 32.1 31.5*     BMP:   Recent Labs   Lab 04/26/23  0331 04/27/23  0507 05/01/23  0445    100 88    139 136   K 3.6 3.7 4.9    103 101   CO2 25 25 25   BUN 22 18 34*   CREATININE 0.9 0.9 1.3   CALCIUM 8.3* 8.3* 8.5*   MG  --   --  1.9     CMP:   Recent Labs   Lab 04/26/23  0331 04/27/23  0507 05/01/23  0445    100 88   CALCIUM 8.3* 8.3* 8.5*    139 136   K 3.6 3.7 4.9   CO2 25 25 25    103 101   BUN 22 18 34*   CREATININE 0.9 0.9 1.3     Lab Results   Component Value Date    ALT 9 (L) 04/24/2023    AST 12 04/24/2023    ALKPHOS 73 04/24/2023    BILITOT 0.2 04/24/2023     Lab Results   Component Value Date    HGBA1C 5.9 (H) 04/28/2023         ASSESSMENT/PLAN:     I have reviewed the medications in compliance with CMS Regulation F756 of the [place]. No irregularities were noted at this time.    Medications reviewed by PharmD, please re-consult if needed.      Sagrraio Veras, Pharm. D.  Clinical Pharmacist  [facility]

## 2023-05-01 NOTE — PROGRESS NOTES
[facility]                                  Skilled Nursing Facility                   Progress Note     Admit Date: 4/27/2023  GILES TBD  Principal Problem:  Acute on chronic diastolic heart failure   HPI obtained from patient interview and chart review     Chief Complaint: Re-evaluation of medical treatment and therapy status: Lab review    HPI:   Jenniffer Jimenez is a 89 y.o. female with PMHx of cataract, [place], CKD, DM II, diabetic polyneuropathy, HTN, PAF, TIA presents to SNF following hospitalization for AFib RVR and presumed cellulitis to right lower extremity.     Interval history: All of today's labs reviewed and are listed below.  BUN/creatinine increased to 34/1.3 from 18/0.9.  24 hr vital sign ranges listed below.  Patient with productive cough.  Patient and daughter concerned about delayed wound healing to bilateral lower extremities.  Patient displays itchy rash to back that patient and daughter states is improving.  Patient denies shortness of breath, abdominal discomfort, nausea, or vomiting.  Patient reports an adequate appetite.  Patient denies dysuria.  Patient reports having regular bowel movements.  Patient progessing with PT/OT. Continuing to follow and treat all acute and chronic conditions.      Past Medical History: Patient has a past medical history of Amblyopia of left eye (04/10/2013), Arthritis, Atherosclerosis of aorta, Cataract, Central retinal vein occlusion of left eye, CKD (chronic kidney disease) stage 3, GFR 30-59 ml/min, Diabetes mellitus, type 2, Diabetic polyneuropathy (01/06/2022), Essential (primary) hypertension, Exotropia of both eyes (02/06/2013), Hearing loss, History of resection of small bowel, Hypertensive retinopathy of both eyes, Hypoglycemia, Macular degeneration, OA (osteoarthritis) of shoulder, Osteoporosis, Paroxysmal atrial fibrillation, Posterior vitreous detachment of both eyes,  Psychiatric problem, Rhinitis, and TIA (transient ischemic attack).    Past Surgical History: Patient has a past surgical history that includes Cardiac catheterization; Inner ear surgery; Sinus surgery; Tonsillectomy;  section, classic; Appendectomy; Strabismus surgery (13); Strabismus surgery (2014); Cataract extraction w/  intraocular lens implant (Bilateral); Hysterectomy; Oophorectomy; Joint replacement; Closure of left atrial appendage using device (N/A, 2020); and watchman surgery (N/A, 2020).    Social History: Patient reports that she quit smoking about 40 years ago. Her smoking use included cigarettes. She has never been exposed to tobacco smoke. She has never used smokeless tobacco. She reports that she does not currently use alcohol after a past usage of about 2.0 standard drinks per week. She reports that she does not use drugs.    Family History: family history includes Breast cancer in her maternal aunt; Diabetes in her brother and sister; Heart disease in her sister and sister; Hypertension in her father and mother; Liver disease in her sister; No Known Problems in her daughter, maternal grandfather, maternal grandmother, maternal uncle, paternal aunt, paternal grandfather, paternal grandmother, paternal uncle, son, son, and son.    Allergies: Patient is allergic to opioids - morphine analogues, tizanidine, tramadol, beta-blockers (beta-adrenergic blocking agts), morphine, opioids-meperidine and related, and ciprofloxacin.    ROS  Constitutional: Negative for fever   Eyes: Negative for blurred vision, double vision   Respiratory: Negative for shortness of breath.  +productive cough   Cardiovascular: Negative for chest pain, palpitations.  + leg swelling.   Gastrointestinal: Negative for abdominal pain, constipation, diarrhea, nausea, vomiting.   Genitourinary: Negative for dysuria, frequency   Musculoskeletal:  + generalized weakness.  + for back pain and myalgias.   Skin:  +  for itching and rash.   Neurological: Negative for dizziness, headaches.   Psychiatric/Behavioral: Negative for depression. The patient is not nervous/anxious.      24 hour Vital Sign Range   Temp:  [97.8 °F (36.6 °C)-98.1 °F (36.7 °C)]   Pulse:  [68-74]   Resp:  [17-18]   BP: (112-122)/(57-60)   SpO2:  [97 %-98 %]     Current BMI: Body mass index is 25.75 kg/m².    PEx  Constitutional: Patient appears debilitated.  No distress noted  HENT:   Head: Normocephalic and atraumatic.   Eyes: Pupils are equal, round  Neck: Normal range of motion. Neck supple.   Cardiovascular: Normal rate, irregular rhythm and normal heart sounds.    Pulmonary/Chest: Effort normal and breath sounds are clear  Abdominal: Soft. Bowel sounds are normal.   Musculoskeletal: Normal range of motion.   Neurological: Alert and oriented to person, place, and time.   Psychiatric: Normal mood and affect. Behavior is normal.   Skin: Skin is warm and dry. Full skin assessment to be completed next assessment.    Recent Labs   Lab 05/01/23  0445      K 4.9      CO2 25   BUN 34*   CREATININE 1.3   MG 1.9       Recent Labs   Lab 05/01/23  0445   WBC 4.86   RBC 2.74*   HGB 7.6*   HCT 24.1*      MCV 88   MCH 27.7   MCHC 31.5*       Recent Labs   Lab 04/29/23  2021 04/30/23  0709 04/30/23  1107 04/30/23  1632 04/30/23  1945 05/01/23  0701   POCTGLUCOSE 191* 103 324* 85 140* 97        Assessment and Plan:    Acute on chronic diastolic heart failure  - last echo from 10/07/2022 with EF of 55%  - monitor Is&Os and daily weights.    - Fluid restriction of 1.5 L, cardiac diet  - Continue Lasix 40mg daily      Cellulitis BLE  Chronic lower extremity wounds  - completed Amoxicillin and Doxycycline x 4 days end date 4/30  - initiated Bob supplements, zinc, multivitamin, ascorbic acid, continue vitamin-E     Community acquired pneumonia of left lower lobe of lung  - completed Amoxicillin and Doxycycline x 4 days  - initiated Mucinex DM, CPT BID x3  "days    Stage 3b chronic kidney disease  - sCr baseline around 1.2  - continue to monitor twice weekly BMPs, avoid nephrotoxic agents, renally dose medications when appropriate.     Chronic a-fib  - [id number] Score: 4. HASBLED Score:  Anticoagulation not on anticoagulation due to watchman device, on asa only   -  BB on allergy list   - Can use CCB (PO dilt) if rate control needed in light of BB "allergy"     Microcytic anemia  - continue ferrous sulfate daily  - continue to monitor twice weekly CBC, Transfuse if Hgb < 7 or symptomatic     Intertrigo  - continue fluconazole 20 mg daily x9 days      Presence of Watchman left atrial appendage closure device  - No need for anticoagulation      Venous insufficiency of both lower extremities  - continue ASA, pravastatin 40 mg daily     Prediabetes  - last A1c 6.0 on 01/26/2023  - cardiac diet  - Accu-Cheks AC/HS     History of TIA (transient ischemic attack)  - Continue ASA 81 mg daily, pravastatin     Pure hypercholesterolemia  - continue pravastatin 40 mg daily    Debility   - Continue with PT/OT for gait training and strengthening and restoration of ADL's   - Encourage mobility, OOB in chair, and early ambulation as appropriate  - Fall precautions   - Monitor for bowel and bladder dysfunction  - Monitor for and prevent skin breakdown and pressure ulcers  - Continue DVT prophylaxis with frequent ambulation              Anticipate disposition:  Home with home health      Follow-up needed during SNF stay-    Follow-up needed after discharge from SNF: PCP, care clinic    Future Appointments   Date Time Provider Department Center   5/4/2023  3:00 PM KIMBERLY Lopez [facility] AUDIO [facility]   5/5/2023  1:00 PM Cody Cox MD [facility] WOUND [facility]   6/15/2023 11:15 AM Lennie Louis DPM [facility] POD [facility]   6/29/2023  8:30 AM Rachael Case MD [facility] IM [place]   7/12/2023 11:30 AM Brent Chapman Jr., MD [facility] CARDIO [place]           I spent > 45 " minutes reviewing patient records, examining, and counseling the patient/ patient's family with greater than 50% of the time spent in direct patient care and coordination.        Suzy Aparicio NP  Department of Hospital Medicine   [facility]     DOS: 5/1/2023       Patient note was created using MModal Dictation.  Any errors in syntax or even information may not have been identified and edited on initial review prior to signing this note.

## 2023-05-01 NOTE — PLAN OF CARE
Problem: Adult Inpatient Plan of Care  Goal: Plan of Care Review  Outcome: Ongoing, Progressing  Goal: Patient-Specific Goal (Individualized)  Outcome: Ongoing, Progressing  Goal: Absence of Hospital-Acquired Illness or Injury  Outcome: Ongoing, Progressing  Goal: Optimal Comfort and Wellbeing  Outcome: Ongoing, Progressing  Goal: Readiness for Transition of Care  Outcome: Ongoing, Progressing     Problem: Diabetes Comorbidity  Goal: Blood Glucose Level Within Targeted Range  Outcome: Ongoing, Progressing     Problem: Fluid Imbalance (Pneumonia)  Goal: Fluid Balance  Outcome: Ongoing, Progressing     Problem: Infection (Pneumonia)  Goal: Resolution of Infection Signs and Symptoms  Outcome: Ongoing, Progressing     Problem: Respiratory Compromise (Pneumonia)  Goal: Effective Oxygenation and Ventilation  Outcome: Ongoing, Progressing     Problem: Impaired Wound Healing  Goal: Optimal Wound Healing  Outcome: Ongoing, Progressing     Problem: Fall Injury Risk  Goal: Absence of Fall and Fall-Related Injury  Outcome: Ongoing, Progressing     Problem: Skin Injury Risk Increased  Goal: Skin Health and Integrity  Outcome: Ongoing, Progressing     Problem: Adult Inpatient Plan of Care  Goal: Plan of Care Review  Outcome: Ongoing, Progressing  Goal: Patient-Specific Goal (Individualized)  Outcome: Ongoing, Progressing  Goal: Absence of Hospital-Acquired Illness or Injury  Outcome: Ongoing, Progressing  Goal: Optimal Comfort and Wellbeing  Outcome: Ongoing, Progressing  Goal: Readiness for Transition of Care  Outcome: Ongoing, Progressing     Problem: Diabetes Comorbidity  Goal: Blood Glucose Level Within Targeted Range  Outcome: Ongoing, Progressing     Problem: Fluid Imbalance (Pneumonia)  Goal: Fluid Balance  Outcome: Ongoing, Progressing     Problem: Infection (Pneumonia)  Goal: Resolution of Infection Signs and Symptoms  Outcome: Ongoing, Progressing     Problem: Respiratory Compromise (Pneumonia)  Goal: Effective  8/5/2022 4:07 PM    Mr. Karla Harper  [address] [id number]      8/5/2022  Name: Karla Harper   MRN: [id number]   YOB: 2019   Date of Visit: 8/5/2022       Dear Dr. Jose Chong MD,     I had the opportunity to see your patient, Karla Harper, age 3 y.o. in the Pediatric Gastroenterology office on 8/5/2022 for evaluation of his:  1. Constipation, unspecified constipation type    2. Periumbilical abdominal pain    3. Voluntary holding of bowel movements    4. Pain with bowel movements        Today's visit included:    Impression:    Karla Harper is a 3 y.o. male being seen today in pediatric GI clinic secondary to issues with  constipation since 10to 9months of age associated with withholding behavior and periumbilical abdominal pain. He is currently on MiraLAX 1 capful once daily with significant improvement in symptoms since the last visit. He has been having regular and softer bowel movements on a daily basis. Mom also reports improvement in oral intake and appetite. He has lost weight recently due to ongoing viral illness and decreased oral intake. Discussed in detail about the importance of increased fiber and water intake and behavior modification in addition to medications in the management of functional constipation. Plan:    Decrease Miralax 0.5 capful in 4 oz of liquid once daily and adjust the dose depending on frequency and consistency of bowel movements  Increase water and fiber intake   Follow up in 3-4 months  Restrict milk and milk products such as cheese, yogurt         Thank you very much for allowing me to participate in Bob's care. Please do not hesitate to contact our office with any questions or concerns.            Sincerely,      Trixie Ko MD Oxygenation and Ventilation  Outcome: Ongoing, Progressing     Problem: Impaired Wound Healing  Goal: Optimal Wound Healing  Outcome: Ongoing, Progressing     Problem: Fall Injury Risk  Goal: Absence of Fall and Fall-Related Injury  Outcome: Ongoing, Progressing     Problem: Skin Injury Risk Increased  Goal: Skin Health and Integrity  Outcome: Ongoing, Progressing     Problem: Adult Inpatient Plan of Care  Goal: Plan of Care Review  Outcome: Ongoing, Progressing  Goal: Patient-Specific Goal (Individualized)  Outcome: Ongoing, Progressing  Goal: Absence of Hospital-Acquired Illness or Injury  Outcome: Ongoing, Progressing  Goal: Optimal Comfort and Wellbeing  Outcome: Ongoing, Progressing  Goal: Readiness for Transition of Care  Outcome: Ongoing, Progressing     Problem: Diabetes Comorbidity  Goal: Blood Glucose Level Within Targeted Range  Outcome: Ongoing, Progressing     Problem: Fluid Imbalance (Pneumonia)  Goal: Fluid Balance  Outcome: Ongoing, Progressing     Problem: Infection (Pneumonia)  Goal: Resolution of Infection Signs and Symptoms  Outcome: Ongoing, Progressing     Problem: Respiratory Compromise (Pneumonia)  Goal: Effective Oxygenation and Ventilation  Outcome: Ongoing, Progressing     Problem: Impaired Wound Healing  Goal: Optimal Wound Healing  Outcome: Ongoing, Progressing     Problem: Fall Injury Risk  Goal: Absence of Fall and Fall-Related Injury  Outcome: Ongoing, Progressing     Problem: Skin Injury Risk Increased  Goal: Skin Health and Integrity  Outcome: Ongoing, Progressing     Problem: Adult Inpatient Plan of Care  Goal: Plan of Care Review  Outcome: Ongoing, Progressing  Goal: Patient-Specific Goal (Individualized)  Outcome: Ongoing, Progressing  Goal: Absence of Hospital-Acquired Illness or Injury  Outcome: Ongoing, Progressing  Goal: Optimal Comfort and Wellbeing  Outcome: Ongoing, Progressing  Goal: Readiness for Transition of Care  Outcome: Ongoing, Progressing     Problem: Diabetes  Comorbidity  Goal: Blood Glucose Level Within Targeted Range  Outcome: Ongoing, Progressing     Problem: Fluid Imbalance (Pneumonia)  Goal: Fluid Balance  Outcome: Ongoing, Progressing     Problem: Infection (Pneumonia)  Goal: Resolution of Infection Signs and Symptoms  Outcome: Ongoing, Progressing     Problem: Respiratory Compromise (Pneumonia)  Goal: Effective Oxygenation and Ventilation  Outcome: Ongoing, Progressing     Problem: Impaired Wound Healing  Goal: Optimal Wound Healing  Outcome: Ongoing, Progressing     Problem: Fall Injury Risk  Goal: Absence of Fall and Fall-Related Injury  Outcome: Ongoing, Progressing     Problem: Skin Injury Risk Increased  Goal: Skin Health and Integrity  Outcome: Ongoing, Progressing

## 2023-05-01 NOTE — PT/OT/SLP PROGRESS
"Physical Therapy Treatment    Patient Name:  Jenniffer Jimenez   MRN:  [id number]  Admit Date: 4/27/2023  Admitting Diagnosis: Acute on chronic diastolic heart failure  Recent Surgeries:     General Precautions: Standard, fall, hearing impaired  Orthopedic Precautions: N/A  Braces: N/A    Recommendations:     Discharge Recommendations: assisted living facility  Level of Assistance Recommended at Discharge: 24 hours light assistance  Discharge Equipment Recommendations: none  Barriers to discharge: None    Assessment:     Jenniffer Jimenez is a 89 y.o. female admitted with a medical diagnosis of Acute on chronic diastolic heart failure . Pt tolerated well, improved confidence noted today with gait trials, pt would continue to benefit from skilled PT services to improve overall functional mobility, strength and endurance.  .      Performance deficits affecting function: weakness, impaired endurance, impaired self care skills, impaired functional mobility, gait instability, impaired balance, decreased upper extremity function, decreased lower extremity function, decreased ROM, impaired cardiopulmonary response to activity.    Rehab Potential is good    Activity Tolerance: Fair    Plan:     Patient to be seen 5 x/week to address the above listed problems via gait training, therapeutic activities, therapeutic exercises, neuromuscular re-education, wheelchair management/training    Plan of Care Expires: 05/28/23  Plan of Care Reviewed with: patient    Subjective     "Ready".     Pain/Comfort:  Pain Rating 1: 0/10  Pain Rating Post-Intervention 1: 0/10    Patient's cultural, spiritual, Synagogue conflicts given the current situation:  yes    Objective:      Patient found in wc BLE bandaged upon PT entry to room.     Therapeutic Activities and Exercises: 2x10 reps AP,GS,LAQ,hip flex, abd/add    Functional Mobility:  Transfers:     Sit to Stand:  contact guard assistance with rolling walker and vcs for tech/hand placement, " pt remembered on 2nd and 3rd trial  Gait: amb with RW CGA wc follow ~ 76 ft, 86 ft and 96 ft seated rest break, improved marco/confidence noted  WC mobility ~ 25 ft with SHANITA Mcgee    AM-PAC 6 CLICK MOBILITY  16    Patient left up in chair with call button in reach and belongings in reach .    GOALS:   Multidisciplinary Problems       Physical Therapy Goals          Problem: Physical Therapy    Goal Priority Disciplines Outcome Goal Variances Interventions   Physical Therapy Goal     PT, PT/OT Ongoing, Progressing     Description: Goals to be met by: 5/28/23     Patient will increase functional independence with mobility by performing:    . Supine to sit with Set-up [place]  . Sit to supine with Set-up [place]  . Rolling to Left and Right with Modified [place].  . Sit to stand transfer with Supervision  . Bed to chair transfer with Supervision using Rolling Walker  . Gait  x 150 feet with Stand-by Assistance using Rolling Walker.   . Wheelchair propulsion x150 feet with Stand-by Assistance using bilateral uppper extremities  . Ascend/Descend 4 inch curb step with Contact Guard Assistance using Rolling Walker.                         Time Tracking:     PT Received On: 05/01/23  PT Start Time: 0842  PT Stop Time: 0922  PT Total Time (min): 40 min    Billable Minutes: Gait Training 18, Therapeutic Activity 12, and Therapeutic Exercise 10    Treatment Type: Treatment  PT/PTA: PTA     Number of PTA visits since last PT visit: 2     05/01/2023

## 2023-05-01 NOTE — PLAN OF CARE
Interdisciplinary team, Elsy Bhat PT, Marquise Chin LMSW, Priyanka Brandon, Activities, Divya Simon RN and Lynn Aldridge, Dietician, spoke to patient and patient's daughter for care plan conference, weekly status update, and therapy progress update. Tentative discharge date set for 5/18/23

## 2023-05-02 LAB
POCT GLUCOSE: 122 MG/DL (ref 70–110)
POCT GLUCOSE: 174 MG/DL (ref 70–110)
POCT GLUCOSE: 199 MG/DL (ref 70–110)
POCT GLUCOSE: 97 MG/DL (ref 70–110)

## 2023-05-02 PROCEDURE — 11000004 HC SNF PRIVATE: Mod: HCNC

## 2023-05-02 PROCEDURE — 25000003 PHARM REV CODE 250: Mod: HCNC | Performed by: HOSPITALIST

## 2023-05-02 PROCEDURE — 94668 MNPJ CHEST WALL SBSQ: CPT | Mod: HCNC

## 2023-05-02 PROCEDURE — 94640 AIRWAY INHALATION TREATMENT: CPT | Mod: HCNC

## 2023-05-02 PROCEDURE — 25000242 PHARM REV CODE 250 ALT 637 W/ HCPCS: Mod: HCNC | Performed by: HOSPITALIST

## 2023-05-02 PROCEDURE — 97116 GAIT TRAINING THERAPY: CPT | Mod: HCNC,CQ

## 2023-05-02 PROCEDURE — 97530 THERAPEUTIC ACTIVITIES: CPT | Mod: HCNC,CQ

## 2023-05-02 PROCEDURE — 25000003 PHARM REV CODE 250: Mod: HCNC | Performed by: NURSE PRACTITIONER

## 2023-05-02 PROCEDURE — 97110 THERAPEUTIC EXERCISES: CPT | Mod: HCNC,CO

## 2023-05-02 PROCEDURE — 94761 N-INVAS EAR/PLS OXIMETRY MLT: CPT | Mod: HCNC

## 2023-05-02 PROCEDURE — 97110 THERAPEUTIC EXERCISES: CPT | Mod: HCNC,CQ

## 2023-05-02 PROCEDURE — 99900035 HC TECH TIME PER 15 MIN (STAT): Mod: HCNC

## 2023-05-02 PROCEDURE — 97535 SELF CARE MNGMENT TRAINING: CPT | Mod: HCNC,CO

## 2023-05-02 RX ORDER — GUAIFENESIN/DEXTROMETHORPHAN 100-10MG/5
10 SYRUP ORAL 3 TIMES DAILY
Status: DISCONTINUED | OUTPATIENT
Start: 2023-05-02 | End: 2023-05-12 | Stop reason: HOSPADM

## 2023-05-02 RX ADMIN — ASPIRIN 81 MG: 81 TABLET, COATED ORAL at 08:05

## 2023-05-02 RX ADMIN — Medication 400 UNITS: at 08:05

## 2023-05-02 RX ADMIN — ZINC SULFATE 220 MG (50 MG) CAPSULE 220 MG: CAPSULE at 08:05

## 2023-05-02 RX ADMIN — ACETAMINOPHEN 1000 MG: 500 TABLET ORAL at 08:05

## 2023-05-02 RX ADMIN — HYDROCORTISONE: 1 CREAM TOPICAL at 08:05

## 2023-05-02 RX ADMIN — FLUCONAZOLE 200 MG: 200 TABLET ORAL at 08:05

## 2023-05-02 RX ADMIN — DICLOFENAC SODIUM 2 G: 10 GEL TOPICAL at 05:05

## 2023-05-02 RX ADMIN — DICLOFENAC SODIUM 2 G: 10 GEL TOPICAL at 08:05

## 2023-05-02 RX ADMIN — IPRATROPIUM BROMIDE AND ALBUTEROL SULFATE 3 ML: 2.5; .5 SOLUTION RESPIRATORY (INHALATION) at 07:05

## 2023-05-02 RX ADMIN — GUAIFENESIN AND DEXTROMETHORPHAN HYDROBROMIDE 1 TABLET: 600; 30 TABLET, EXTENDED RELEASE ORAL at 08:05

## 2023-05-02 RX ADMIN — THERA TABS 1 TABLET: TAB at 09:05

## 2023-05-02 RX ADMIN — PRAVASTATIN SODIUM 40 MG: 20 TABLET ORAL at 08:05

## 2023-05-02 RX ADMIN — MICONAZOLE NITRATE: 20 OINTMENT TOPICAL at 08:05

## 2023-05-02 RX ADMIN — IPRATROPIUM BROMIDE AND ALBUTEROL SULFATE 3 ML: 2.5; .5 SOLUTION RESPIRATORY (INHALATION) at 09:05

## 2023-05-02 RX ADMIN — DICLOFENAC SODIUM 2 G: 10 GEL TOPICAL at 01:05

## 2023-05-02 RX ADMIN — SILVER SULFADIAZINE: 10 CREAM TOPICAL at 08:05

## 2023-05-02 RX ADMIN — Medication 250 MG: at 08:05

## 2023-05-02 RX ADMIN — HYDROCORTISONE: 1 CREAM TOPICAL at 05:05

## 2023-05-02 RX ADMIN — FUROSEMIDE 40 MG: 40 TABLET ORAL at 08:05

## 2023-05-02 RX ADMIN — FLUTICASONE PROPIONATE 100 MCG: 50 SPRAY, METERED NASAL at 08:05

## 2023-05-02 RX ADMIN — FERROUS SULFATE TAB 325 MG (65 MG ELEMENTAL FE) 1 EACH: 325 (65 FE) TAB at 08:05

## 2023-05-02 RX ADMIN — GUAIFENESIN AND DEXTROMETHORPHAN 10 ML: 100; 10 SYRUP ORAL at 08:05

## 2023-05-02 RX ADMIN — GUAIFENESIN AND DEXTROMETHORPHAN 10 ML: 100; 10 SYRUP ORAL at 03:05

## 2023-05-02 NOTE — PROGRESS NOTES
[facility] - Skilled Nursing  Wound Care    Patient Name:  Jenniffer Jimenez   MRN:  [id number]  Date: 2023  Diagnosis: Acute on chronic diastolic heart failure    History:     Past Medical History:   Diagnosis Date    Amblyopia of left eye 04/10/2013    Arthritis     Facet arthropathy, Lumbosacral    Atherosclerosis of aorta     Cataract     Central retinal vein occlusion of left eye     CKD (chronic kidney disease) stage 3, GFR 30-59 ml/min     Diabetes mellitus, type 2     Diabetic polyneuropathy 2022    Essential (primary) hypertension     Exotropia of both eyes 2013    recession RSR 5.0mm w/ adj; recession LR os 5.0 w/ adj; resect MR os  4.0mm    Hearing loss     History of resection of small bowel     Hypertensive retinopathy of both eyes     Hypoglycemia     Macular degeneration     OA (osteoarthritis) of shoulder     Right    Osteoporosis     Paroxysmal atrial fibrillation     Posterior vitreous detachment of both eyes     Psychiatric problem     Rhinitis     TIA (transient ischemic attack)        Social History     Socioeconomic History    Marital status:    Occupational History    Occupation: retired   Tobacco Use    Smoking status: Former     Types: Cigarettes     Quit date: 10/29/1982     Years since quittin.5     Passive exposure: Never    Smokeless tobacco: Never   Substance and Sexual Activity    Alcohol use: Not Currently     Alcohol/week: 2.0 standard drinks     Types: 2 Shots of liquor per week     Comment: rare    Drug use: No    Sexual activity: Never   Other Topics Concern    Are you pregnant or think you may be? No    Breast-feeding No     Social Determinants of Health     Financial Resource Strain: Low Risk     Difficulty of Paying Living Expenses: Not very hard   Food Insecurity: No Food Insecurity    Worried About Running Out of Food in the Last Year: Never true    Ran Out of Food in the Last Year: Never true   Transportation Needs: No Transportation Needs     Lack of Transportation (Medical): No    Lack of Transportation (Non-Medical): No   Physical Activity: Inactive    Days of Exercise per Week: 0 days    Minutes of Exercise per Session: 0 min   Stress: No Stress Concern Present    Feeling of Stress : Only a little   Social Connections: Moderately Isolated    Frequency of Communication with Friends and Family: Twice a week    Frequency of Social Gatherings with Friends and Family: Once a week    Attends Roman Catholic Services: Never    Active Member of Clubs or Organizations: Yes    Attends Club or Organization Meetings: Never    Marital Status:    Housing Stability: Low Risk     Unable to Pay for Housing in the Last Year: No    Number of Places Lived in the Last Year: 1    Unstable Housing in the Last Year: No       Precautions:     Allergies as of 04/27/2023 - Reviewed 04/27/2023   Allergen Reaction Noted    Opioids - morphine analogues Other (See Comments) 08/15/2018    Tizanidine Other (See Comments) 04/06/2018    Tramadol Hallucinations 01/18/2013    Beta-blockers (beta-adrenergic blocking agts) Other (See Comments) 10/23/2013    Morphine  12/24/2021    Opioids-meperidine and related  01/04/2022    Ciprofloxacin Rash 12/28/2022       [facility] Assessment Details/Treatment   Wound care consulted for bilateral lower legs, groin, buttocks  The BLE skin is crusty/peeling off to reveal dry,shiny, red skin with irregular edges and thickened skin near ankles.  The skin was cleansed with VASHE wound solution, pat dry, then Miconazole ointment applied to skin.  The lower abdominal fold- pannus- bilateral groins, inner thighs, buttocks, and groin area are red, moist, peeling skin with irritation. The skin was cleansed with VASHE wound solution, pat dry, then Miconazole ointment applied to skin.   Discussed wound care with Ms. Jimenez and daughter- verbalized understanding.    Plan:  BLE- cleanse skin with VASHE wound solution, pat dry then apply Miconazole ointment BID  May  leave open to air.  IF weeping, cover with white pad while in bed/up in chair.  Lower abdominal skin fold, groins, perineal, buttocks, inner thighs- cleanse with Vashe wound solution, pat dry then apply Miconazole ointment BID/prn cleansing.   Continue pressure prevention measures  Refer to Derm for consult--possible triamcindone acetonide 1% BID to BLE  Recommend Antifungal medication PO.    Nursing to continue care, wound care will follow-up  Recommendations made to primary team for above plan per written report and verbal  . Orders placed.      05/02/23 0900        Altered Skin Integrity 04/24/23 1136 Buttocks Moisture associated dermatitis Intact skin with non-blanchable redness of localized area   Date First Assessed/Time First Assessed: 04/24/23 1136   Altered Skin Integrity Present on Admission - Did Patient arrive to the hospital with altered skin?: yes  Location: (c) Buttocks  Is this injury device related?: No  Primary Wound Type: Moisture...   Wound Image    Dressing Appearance Open to air   Drainage Amount Scant   Drainage Characteristics/Odor Clear   Appearance Pink;Red;Moist   Tissue loss description Not applicable   Periwound Area Dry   Wound Edges Rolled/closed   Care Cleansed with:;Soap and water;Applied:;Skin Barrier;Other (see comments)  (miconazole ointment)   Dressing Change Due 05/02/23        Altered Skin Integrity Left lower;lateral Leg   No Date First Assessed or Time First Assessed found.   Altered Skin Integrity Present on Admission - Did Patient arrive to the hospital with altered skin?: yes  Side: Left  Orientation: lower;lateral  Location: Leg   Wound Image    Dressing Appearance Dry;Intact;Clean   Drainage Amount Scant   Drainage Characteristics/Odor Clear   Appearance Red;Dry   Tissue loss description Not applicable   Periwound Area Intact;Dry;Excoriated;Redness  (miconazole ointment)   Care Cleansed with:;Antimicrobial agent;Applied:;Skin Barrier        Altered Skin Integrity Right  anterior;lower;lateral;medial;posterior Leg   No Date First Assessed or Time First Assessed found.   Altered Skin Integrity Present on Admission - Did Patient arrive to the hospital with altered skin?: yes  Side: Right  Orientation: anterior;lower;lateral;medial;posterior  Location: Leg   Wound Image     Dressing Appearance Dry;Intact;Clean   Drainage Amount Scant   Drainage Characteristics/Odor Clear   Appearance Red;Dry   Tissue loss description Not applicable   Periwound Area Intact;Excoriated;Redness   Care Cleansed with:;Antimicrobial agent;Applied:;Skin Barrier  (micnoazole ointment)   Dressing Change Due 05/02/23        Rash 04/24/23 1047 Left anterior Groin   Date of First Assessment/Time of First Assessment: 04/24/23 1047   Present Prior to Hospital Arrival?: Yes  Side: Left  Orientation: anterior  Location: Groin   Distribution regional   Configuration/Shape asymmetric   Characteristics redness/erythema;pruritus/itching;burning   Color deep red;red        Rash 04/24/23 1047 Right anterior Groin   Date of First Assessment/Time of First Assessment: 04/24/23 1047   Present Prior to Hospital Arrival?: Yes  Side: Right  Orientation: anterior  Location: Groin   Distribution regional   Configuration/Shape asymmetric   Characteristics redness/erythema;pruritus/itching;burning   Color red       05/02/2023

## 2023-05-02 NOTE — PT/OT/SLP PROGRESS
"Physical Therapy Treatment    Patient Name:  Jenniffer Jimenez   MRN:  [id number]  Admit Date: 4/27/2023  Admitting Diagnosis: Acute on chronic diastolic heart failure  Recent Surgeries:     General Precautions: Standard, fall, hearing impaired  Orthopedic Precautions: N/A  Braces: N/A    Recommendations:     Discharge Recommendations: assisted living facility  Level of Assistance Recommended at Discharge: 24 hours light assistance  Discharge Equipment Recommendations: none  Barriers to discharge: None    Assessment:     Jenniffer Jimenez is a 89 y.o. female admitted with a medical diagnosis of Acute on chronic diastolic heart failure . Pt tolerated well, pt would continue to benefit from skilled PT services to improve overall functional mobility, strength and endurance.  .      Performance deficits affecting function: weakness, impaired endurance, impaired self care skills, impaired functional mobility, gait instability, impaired balance, decreased upper extremity function, decreased lower extremity function, decreased ROM, impaired cardiopulmonary response to activity.    Rehab Potential is good    Activity Tolerance: Fair    Plan:     Patient to be seen 5 x/week to address the above listed problems via gait training, therapeutic activities, therapeutic exercises, neuromuscular re-education, wheelchair management/training    Plan of Care Expires: 05/28/23  Plan of Care Reviewed with: patient    Subjective     "Lets go".     Pain/Comfort:  Pain Rating 1: 0/10  Pain Rating Post-Intervention 1: 0/10    Patient's cultural, spiritual, Alevism conflicts given the current situation:  yes    Objective:       Patient found with  (in wc) upon PT entry to room.     Therapeutic Activities and Exercises: mini elliptical x 10 min    Functional Mobility:  Transfers:     Sit to Stand:  contact guard assistance with rolling walker  Toilet Transfer: contact guard assistance with  no AD and grab bars  using  Stand Pivot  Gait: " "amb with RW CGA  95 ft occ unsteady but no LOB  Curb asc/walker 4" curb with RW min A to ascend  and min A to descend , However after stepping up pt began to lean back, when I instructed not to lean back, (Max A to maintain balance) she would lean more, unsure if she couldn't hear correctly and po,ss due to fear, the tech had to put the WC up on the step to let pt sit down, before stepping down.      AM-PAC 6 CLICK MOBILITY  16    Patient left up in chair with call button in reach and belongings in reach .    GOALS:   Multidisciplinary Problems       Physical Therapy Goals          Problem: Physical Therapy    Goal Priority Disciplines Outcome Goal Variances Interventions   Physical Therapy Goal     PT, PT/OT Ongoing, Progressing     Description: Goals to be met by: 5/28/23     Patient will increase functional independence with mobility by performing:    . Supine to sit with Set-up [place]  . Sit to supine with Set-up [place]  . Rolling to Left and Right with Modified [place].  . Sit to stand transfer with Supervision  . Bed to chair transfer with Supervision using Rolling Walker  . Gait  x 150 feet with Stand-by Assistance using Rolling Walker.   . Wheelchair propulsion x150 feet with Stand-by Assistance using bilateral uppper extremities  . Ascend/Descend 4 inch curb step with Contact Guard Assistance using Rolling Walker.                         Time Tracking:     PT Received On: 05/02/23  PT Start Time: 1017  PT Stop Time: 1057  PT Total Time (min): 40 min    Billable Minutes: Gait Training 10, Therapeutic Activity 20, and Therapeutic Exercise 10    Treatment Type: Treatment  PT/PTA: PTA     Number of PTA visits since last PT visit: 3     05/02/2023  "

## 2023-05-02 NOTE — PT/OT/SLP PROGRESS
Occupational Therapy   Treatment    Name: Jenniffer Jimenez  MRN: [id number]  Admit Date: 4/27/2023  Admitting Diagnosis:  Acute on chronic diastolic heart failure    General Precautions: Standard, fall, hearing impaired   Orthopedic Precautions: N/A   Braces: N/A    Recommendations:     Discharge Recommendations:  assisted living facility  Level of Assistance Recommended at Discharge: Intermittent assistance for ADL's and homemaking tasks  Discharge Equipment Recommendations: none  Barriers to discharge:  None    Assessment:     Jenniffer Jimenez is a 89 y.o. female with a medical diagnosis of Acute on chronic diastolic heart failure.  She presents with  Performance deficits affecting function are weakness, impaired endurance, impaired self care skills, impaired functional mobility, gait instability, impaired balance, decreased upper extremity function, decreased lower extremity function, decreased ROM, impaired cardiopulmonary response to activity.     Pt. Was cooperative and participated well with session on this day. Pt  continues to demonstrate levels of physical deficits with  functional indep with daily management activities tasks, selfcare skills with balance,  functional mobility, UB strength and endurance. Pt. Will continue to benefit from continued OT to progress towards goals      Rehab Potential is fair    Activity tolerance:  Fair    Plan:     Patient to be seen 5 x/week to address the above listed problems via self-care/home management, therapeutic activities, therapeutic exercises    Plan of Care Expires: 05/19/23  Plan of Care Reviewed with: patient    Subjective     Communicated with: nsg and Pt. prior to session.  Pranav Carcamo thank you from working with me    Pain/Comfort:  Pain Rating 1: 0/10  Pain Rating Post-Intervention 1: 0/10    Patient's cultural, spiritual, Judaism conflicts given the current situation:  yes    Objective:     Patient found up in chair with   upon OT entry to room.    Bed  Mobility:    Patient completed Rolling/Turning to Left with  contact guard assistance  Patient completed Rolling/Turning to Right with contact guard assistance  Patient completed Scooting/Bridging with minimum assistance  Patient completed Supine to Sit with minimum assistance and with side rail     Functional Mobility/Transfers:  Patient completed Sit <> Stand Transfer with contact guard assistance  with  rolling walker   Patient completed Bed <> Chair Transfer using Stand Pivot technique with contact guard assistance with rolling walker      Activities of Daily Living:  Grooming: stand by assistance with grooming needs  Upper Body Dressing: minimum assistance to doff/wilberto pull of shirt'  Lower Body Dressing: maximal assistance to wilberto depends and UW EOB level and to mange over hips instance with RW for  bal and TA for BLE socks    [facility] 6 Click ADL: 17    OT Exercises: UE Ergometer 10 min    Treatment & Education:  Pt edu on role of OT, POC, safety when performing self care tasks , benefit of performing OOB activity, and safety when performing functional transfers and mobility management for preparation with goals to progress towards next level of care     Patient left up in chair with  PTA present    GOALS:   Multidisciplinary Problems       Occupational Therapy Goals          Problem: Occupational Therapy    Goal Priority Disciplines Outcome Interventions   Occupational Therapy Goal     OT, PT/OT Ongoing, Progressing    Description: Goals to be met by: 5/19/23     Patient will increase functional independence with ADLs by performing:    UE Dressing with Stand-by Assistance.  LE Dressing with Stand-by Assistance and Assistive Devices as needed.  Grooming while standing at sink with Supervision.  Toileting from toilet with Supervision for hygiene and clothing management.   Bathing from  standing at sink with Stand-by Assistance.  Supine to sit with Supervision.  Step transfer with Supervision  Upper extremity  exercise program with assistance as needed.                         Time Tracking:     OT Date of Treatment: 05/02/23  OT Start Time: 0930    OT Stop Time: 1009  OT Total Time (min): 39 min    Billable Minutes:Self Care/Home Management 29  Therapeutic Exercise 10    5/2/2023  A client care conference was performed between the LOTR and OLIVAREZ, prior to treatment to discuss the patient's status, treatment plan and established goals.

## 2023-05-02 NOTE — PLAN OF CARE
Problem: Adult Inpatient Plan of Care  Goal: Plan of Care Review  Outcome: Ongoing, Progressing  Goal: Patient-Specific Goal (Individualized)  Outcome: Ongoing, Progressing  Goal: Absence of Hospital-Acquired Illness or Injury  Outcome: Ongoing, Progressing  Goal: Optimal Comfort and Wellbeing  Outcome: Ongoing, Progressing  Goal: Readiness for Transition of Care  Outcome: Ongoing, Progressing     Problem: Diabetes Comorbidity  Goal: Blood Glucose Level Within Targeted Range  Outcome: Ongoing, Progressing     Problem: Fluid Imbalance (Pneumonia)  Goal: Fluid Balance  Outcome: Ongoing, Progressing     Problem: Infection (Pneumonia)  Goal: Resolution of Infection Signs and Symptoms  Outcome: Ongoing, Progressing     Problem: Respiratory Compromise (Pneumonia)  Goal: Effective Oxygenation and Ventilation  Outcome: Ongoing, Progressing     Problem: Impaired Wound Healing  Goal: Optimal Wound Healing  Outcome: Ongoing, Progressing     Problem: Fall Injury Risk  Goal: Absence of Fall and Fall-Related Injury  Outcome: Ongoing, Progressing     Problem: Skin Injury Risk Increased  Goal: Skin Health and Integrity  Outcome: Ongoing, Progressing     Problem: Adult Inpatient Plan of Care  Goal: Plan of Care Review  Outcome: Ongoing, Progressing  Goal: Patient-Specific Goal (Individualized)  Outcome: Ongoing, Progressing  Goal: Absence of Hospital-Acquired Illness or Injury  Outcome: Ongoing, Progressing  Goal: Optimal Comfort and Wellbeing  Outcome: Ongoing, Progressing  Goal: Readiness for Transition of Care  Outcome: Ongoing, Progressing     Problem: Diabetes Comorbidity  Goal: Blood Glucose Level Within Targeted Range  Outcome: Ongoing, Progressing     Problem: Fluid Imbalance (Pneumonia)  Goal: Fluid Balance  Outcome: Ongoing, Progressing     Problem: Infection (Pneumonia)  Goal: Resolution of Infection Signs and Symptoms  Outcome: Ongoing, Progressing     Problem: Respiratory Compromise (Pneumonia)  Goal: Effective  Oxygenation and Ventilation  Outcome: Ongoing, Progressing     Problem: Impaired Wound Healing  Goal: Optimal Wound Healing  Outcome: Ongoing, Progressing     Problem: Fall Injury Risk  Goal: Absence of Fall and Fall-Related Injury  Outcome: Ongoing, Progressing     Problem: Skin Injury Risk Increased  Goal: Skin Health and Integrity  Outcome: Ongoing, Progressing     Problem: Adult Inpatient Plan of Care  Goal: Plan of Care Review  Outcome: Ongoing, Progressing  Goal: Patient-Specific Goal (Individualized)  Outcome: Ongoing, Progressing  Goal: Absence of Hospital-Acquired Illness or Injury  Outcome: Ongoing, Progressing  Goal: Optimal Comfort and Wellbeing  Outcome: Ongoing, Progressing  Goal: Readiness for Transition of Care  Outcome: Ongoing, Progressing     Problem: Diabetes Comorbidity  Goal: Blood Glucose Level Within Targeted Range  Outcome: Ongoing, Progressing     Problem: Fluid Imbalance (Pneumonia)  Goal: Fluid Balance  Outcome: Ongoing, Progressing     Problem: Infection (Pneumonia)  Goal: Resolution of Infection Signs and Symptoms  Outcome: Ongoing, Progressing     Problem: Respiratory Compromise (Pneumonia)  Goal: Effective Oxygenation and Ventilation  Outcome: Ongoing, Progressing     Problem: Impaired Wound Healing  Goal: Optimal Wound Healing  Outcome: Ongoing, Progressing     Problem: Fall Injury Risk  Goal: Absence of Fall and Fall-Related Injury  Outcome: Ongoing, Progressing     Problem: Skin Injury Risk Increased  Goal: Skin Health and Integrity  Outcome: Ongoing, Progressing     Problem: Adult Inpatient Plan of Care  Goal: Plan of Care Review  Outcome: Ongoing, Progressing  Goal: Patient-Specific Goal (Individualized)  Outcome: Ongoing, Progressing  Goal: Absence of Hospital-Acquired Illness or Injury  Outcome: Ongoing, Progressing  Goal: Optimal Comfort and Wellbeing  Outcome: Ongoing, Progressing  Goal: Readiness for Transition of Care  Outcome: Ongoing, Progressing     Problem: Diabetes  Comorbidity  Goal: Blood Glucose Level Within Targeted Range  Outcome: Ongoing, Progressing     Problem: Fluid Imbalance (Pneumonia)  Goal: Fluid Balance  Outcome: Ongoing, Progressing     Problem: Infection (Pneumonia)  Goal: Resolution of Infection Signs and Symptoms  Outcome: Ongoing, Progressing     Problem: Respiratory Compromise (Pneumonia)  Goal: Effective Oxygenation and Ventilation  Outcome: Ongoing, Progressing     Problem: Impaired Wound Healing  Goal: Optimal Wound Healing  Outcome: Ongoing, Progressing     Problem: Fall Injury Risk  Goal: Absence of Fall and Fall-Related Injury  Outcome: Ongoing, Progressing     Problem: Skin Injury Risk Increased  Goal: Skin Health and Integrity  Outcome: Ongoing, Progressing     Problem: Adult Inpatient Plan of Care  Goal: Plan of Care Review  Outcome: Ongoing, Progressing  Goal: Patient-Specific Goal (Individualized)  Outcome: Ongoing, Progressing  Goal: Absence of Hospital-Acquired Illness or Injury  Outcome: Ongoing, Progressing  Goal: Optimal Comfort and Wellbeing  Outcome: Ongoing, Progressing  Goal: Readiness for Transition of Care  Outcome: Ongoing, Progressing     Problem: Diabetes Comorbidity  Goal: Blood Glucose Level Within Targeted Range  Outcome: Ongoing, Progressing     Problem: Fluid Imbalance (Pneumonia)  Goal: Fluid Balance  Outcome: Ongoing, Progressing     Problem: Infection (Pneumonia)  Goal: Resolution of Infection Signs and Symptoms  Outcome: Ongoing, Progressing     Problem: Respiratory Compromise (Pneumonia)  Goal: Effective Oxygenation and Ventilation  Outcome: Ongoing, Progressing     Problem: Impaired Wound Healing  Goal: Optimal Wound Healing  Outcome: Ongoing, Progressing     Problem: Fall Injury Risk  Goal: Absence of Fall and Fall-Related Injury  Outcome: Ongoing, Progressing     Problem: Skin Injury Risk Increased  Goal: Skin Health and Integrity  Outcome: Ongoing, Progressing

## 2023-05-02 NOTE — PROGRESS NOTES
[facility]                                  Skilled Nursing Facility                   Progress Note     Admit Date: 4/27/2023  GILES 5/18/2023  Principal Problem:  Acute on chronic diastolic heart failure   HPI obtained from patient interview and chart review     Chief Complaint: Re-evaluation of medical treatment and therapy status, re-eval cough     HPI:   Jenniffer Jimenez is a 89 y.o. female with PMHx of cataract, [place], CKD, DM II, diabetic polyneuropathy, HTN, PAF, TIA presents to SNF following hospitalization for AFib RVR and presumed cellulitis to right lower extremity.     Interval history:  24 hr vital sign ranges listed below.  Patient states her cough is improved.  Seen by Wound Care today, orders updated.  Patient denies shortness of breath, abdominal discomfort, nausea, or vomiting.  Patient reports an adequate appetite.  Patient denies dysuria.  Patient reports having regular bowel movements.  Patient progessing with PT/OT- Gait: amb with RW CGA wc follow ~ 76 ft, 86 ft and 96 ft seated rest break, improved mraco/confidence noted. Continuing to follow and treat all acute and chronic conditions.      Past Medical History: Patient has a past medical history of Amblyopia of left eye (04/10/2013), Arthritis, Atherosclerosis of aorta, Cataract, Central retinal vein occlusion of left eye, CKD (chronic kidney disease) stage 3, GFR 30-59 ml/min, Diabetes mellitus, type 2, Diabetic polyneuropathy (01/06/2022), Essential (primary) hypertension, Exotropia of both eyes (02/06/2013), Hearing loss, History of resection of small bowel, Hypertensive retinopathy of both eyes, Hypoglycemia, Macular degeneration, OA (osteoarthritis) of shoulder, Osteoporosis, Paroxysmal atrial fibrillation, Posterior vitreous detachment of both eyes, Psychiatric problem, Rhinitis, and TIA (transient ischemic attack).    Past Surgical History: Patient has a  past surgical history that includes Cardiac catheterization; Inner ear surgery; Sinus surgery; Tonsillectomy;  section, classic; Appendectomy; Strabismus surgery (13); Strabismus surgery (2014); Cataract extraction w/  intraocular lens implant (Bilateral); Hysterectomy; Oophorectomy; Joint replacement; Closure of left atrial appendage using device (N/A, 2020); and watchman surgery (N/A, 2020).    Social History: Patient reports that she quit smoking about 40 years ago. Her smoking use included cigarettes. She has never been exposed to tobacco smoke. She has never used smokeless tobacco. She reports that she does not currently use alcohol after a past usage of about 2.0 standard drinks per week. She reports that she does not use drugs.    Family History: family history includes Breast cancer in her maternal aunt; Diabetes in her brother and sister; Heart disease in her sister and sister; Hypertension in her father and mother; Liver disease in her sister; No Known Problems in her daughter, maternal grandfather, maternal grandmother, maternal uncle, paternal aunt, paternal grandfather, paternal grandmother, paternal uncle, son, son, and son.    Allergies: Patient is allergic to opioids - morphine analogues, tizanidine, tramadol, beta-blockers (beta-adrenergic blocking agts), morphine, opioids-meperidine and related, and ciprofloxacin.    ROS  Constitutional: Negative for fever   Eyes: Negative for blurred vision, double vision   Respiratory: Negative for shortness of breath.  +productive cough   Cardiovascular: Negative for chest pain, palpitations.  + leg swelling.   Gastrointestinal: Negative for abdominal pain, constipation, diarrhea, nausea, vomiting.   Genitourinary: Negative for dysuria, frequency   Musculoskeletal:  + generalized weakness.  + for back pain and myalgias.   Skin:  + for itching and rash.   Neurological: Negative for dizziness, headaches.   Psychiatric/Behavioral: Negative  for depression. The patient is not nervous/anxious.      24 hour Vital Sign Range   Temp:  [97.4 °F (36.3 °C)]   Pulse:  [70-87]   Resp:  [18-20]   BP: (103)/(59)   SpO2:  [96 %-100 %]     Current BMI: Body mass index is 26.85 kg/m².    PEx  Constitutional: Patient appears debilitated.  No distress noted  HENT:   Head: Normocephalic and atraumatic.   Eyes: Pupils are equal, round  Neck: Normal range of motion. Neck supple.   Cardiovascular: Normal rate, irregular rhythm and normal heart sounds.    Pulmonary/Chest: Effort normal and breath sounds are clear  Abdominal: Soft. Bowel sounds are normal.   Musculoskeletal: Normal range of motion.   Neurological: Alert and oriented to person, place, and time.   Psychiatric: Normal mood and affect. Behavior is normal.   Skin: Skin is warm and dry.       05/02/23 0900         Altered Skin Integrity 04/24/23 1136 Buttocks Moisture associated dermatitis Intact skin with non-blanchable redness of localized area   Date First Assessed/Time First Assessed: 04/24/23 1136   Altered Skin Integrity Present on Admission - Did Patient arrive to the hospital with altered skin?: yes  Location: (c) Buttocks  Is this injury device related?: No  Primary Wound Type: Moisture...   Dressing Appearance Open to air   Drainage Amount Scant   Drainage Characteristics/Odor Clear   Appearance Pink;Red;Moist   Tissue loss description Not applicable   Periwound Area Dry   Wound Edges Rolled/closed   Care Cleansed with:;Soap and water;Applied:;Skin Barrier;Other (see comments)  (miconazole ointment)   Dressing Change Due 05/02/23        Altered Skin Integrity Left lower;lateral Leg   No Date First Assessed or Time First Assessed found.   Altered Skin Integrity Present on Admission - Did Patient arrive to the hospital with altered skin?: yes  Side: Left  Orientation: lower;lateral  Location: Leg   Dressing Appearance Dry;Intact;Clean   Drainage Amount Scant   Drainage Characteristics/Odor Clear    Appearance Red;Dry   Tissue loss description Not applicable   Periwound Area Intact;Dry;Excoriated;Redness  (miconazole ointment)   Care Cleansed with:;Antimicrobial agent;Applied:;Skin Barrier        Altered Skin Integrity Right anterior;lower;lateral;medial;posterior Leg   No Date First Assessed or Time First Assessed found.   Altered Skin Integrity Present on Admission - Did Patient arrive to the hospital with altered skin?: yes  Side: Right  Orientation: anterior;lower;lateral;medial;posterior  Location: Leg   Dressing Appearance Dry;Intact;Clean   Drainage Amount Scant   Drainage Characteristics/Odor Clear   Appearance Red;Dry   Tissue loss description Not applicable   Periwound Area Intact;Excoriated;Redness   Care Cleansed with:;Antimicrobial agent;Applied:;Skin Barrier  (micnoazole ointment)   Dressing Change Due 05/02/23        Rash 04/24/23 1047 Left anterior Groin   Date of First Assessment/Time of First Assessment: 04/24/23 1047   Present Prior to Hospital Arrival?: Yes  Side: Left  Orientation: anterior  Location: Groin   Distribution regional   Configuration/Shape asymmetric   Characteristics redness/erythema;pruritus/itching;burning   Color deep red;red        Rash 04/24/23 1047 Right anterior Groin   Date of First Assessment/Time of First Assessment: 04/24/23 1047   Present Prior to Hospital Arrival?: Yes  Side: Right  Orientation: anterior  Location: Groin   Distribution regional   Configuration/Shape asymmetric   Characteristics redness/erythema;pruritus/itching;burning         No results for input(s): GLUCOSE, NA, K, CL, CO2, BUN, CREATININE, MG in the last 24 hours.    Invalid input(s):  CALCIUM      No results for input(s): WBC, RBC, HGB, HCT, PLT, MCV, MCH, MCHC in the last 24 hours.      Recent Labs   Lab 04/30/23  1945 05/01/23  0701 05/01/23  1135 05/01/23  1616 05/01/23  2108 05/02/23  0709   POCTGLUCOSE 140* 97 117* 148* 137* 97        Assessment and Plan:    Acute on chronic diastolic  "heart failure  - last echo from 10/07/2022 with EF of 55%  - monitor Is&Os and daily weights.    - Fluid restriction of 1.5 L, cardiac diet  - stable, Continue Lasix 40mg daily      Cellulitis BLE  Chronic lower extremity wounds  - completed Amoxicillin and Doxycycline x 4 days end date 4/30  - Bob supplements, zinc, multivitamin, ascorbic acid, continue vitamin-E  - ambulatory referral placed for Dermatology for patient to have further evaluation.     Community acquired pneumonia of left lower lobe of lung  Productive cough  - completed Amoxicillin and Doxycycline x 4 days  - persisting, change Mucinex DM to liquid formula, CPT BID x3 days    Stage 3b chronic kidney disease  - sCr baseline around 1.2  - stable, continue to monitor twice weekly BMPs, avoid nephrotoxic agents, renally dose medications when appropriate.     Chronic a-fib  - [id number] Score: 4. HASBLED Score:  Anticoagulation not on anticoagulation due to watchman device, on asa only   -  BB on allergy list   - Can use CCB (PO dilt) if rate control needed in light of BB "allergy"     Microcytic anemia  - continue ferrous sulfate daily  - stable, continue to monitor twice weekly CBC, Transfuse if Hgb < 7 or symptomatic     Intertrigo  - severe, continue fluconazole 200 mg daily x9 days  - referral placed to Dermatology for patient to have further evaluation      Presence of Watchman left atrial appendage closure device  - No need for anticoagulation      Venous insufficiency of both lower extremities  - continue ASA, pravastatin 40 mg daily     Prediabetes  - last A1c 6.0 on 01/26/2023  - cardiac diet  - [place]-Storm AC/HS     History of TIA (transient ischemic attack)  - Continue ASA 81 mg daily, pravastatin     Pure hypercholesterolemia  - continue pravastatin 40 mg daily    Debility   - Continue with PT/OT for gait training and strengthening and restoration of ADL's   - Encourage mobility, OOB in chair, and early ambulation as appropriate  - Fall " precautions   - Monitor for bowel and bladder dysfunction  - Monitor for and prevent skin breakdown and pressure ulcers  - Continue DVT prophylaxis with frequent ambulation              Anticipate disposition:  Home with home health      Follow-up needed during SNF stay-    Follow-up needed after discharge from SNF: PCP, care clinic    Future Appointments   Date Time Provider Department Center   5/4/2023  3:00 PM KIMBERLY Lopez [facility] AUDIO [facility]   5/5/2023  1:00 PM Cody Cox MD [facility] WOUND [facility]   6/15/2023 11:15 AM Lennie Louis DPM [facility] POD [facility]   6/29/2023  8:30 AM Rachael Case MD [facility] IM [facility]   7/12/2023 11:30 AM Brent Chapman Jr., MD [facility] CARDIO [facility]       Suzy Aparicio NP  Department of Hospital Medicine   [facility]     DOS: 5/2/2023       Patient note was created using MModal Dictation.  Any errors in syntax or even information may not have been identified and edited on initial review prior to signing this note.

## 2023-05-03 ENCOUNTER — TELEPHONE (OUTPATIENT)
Dept: WOUND CARE | Facility: HOSPITAL | Age: 88
End: 2023-05-03
Payer: MEDICARE

## 2023-05-03 LAB
POCT GLUCOSE: 103 MG/DL (ref 70–110)
POCT GLUCOSE: 150 MG/DL (ref 70–110)
POCT GLUCOSE: 96 MG/DL (ref 70–110)

## 2023-05-03 PROCEDURE — 94668 MNPJ CHEST WALL SBSQ: CPT | Mod: HCNC

## 2023-05-03 PROCEDURE — 97530 THERAPEUTIC ACTIVITIES: CPT | Mod: HCNC,CO

## 2023-05-03 PROCEDURE — 97110 THERAPEUTIC EXERCISES: CPT | Mod: HCNC,CQ

## 2023-05-03 PROCEDURE — 11000004 HC SNF PRIVATE: Mod: HCNC

## 2023-05-03 PROCEDURE — 94761 N-INVAS EAR/PLS OXIMETRY MLT: CPT | Mod: HCNC

## 2023-05-03 PROCEDURE — 99306 1ST NF CARE HIGH MDM 50: CPT | Mod: AI,HCNC,, | Performed by: HOSPITALIST

## 2023-05-03 PROCEDURE — 25000242 PHARM REV CODE 250 ALT 637 W/ HCPCS: Mod: HCNC | Performed by: HOSPITALIST

## 2023-05-03 PROCEDURE — 94640 AIRWAY INHALATION TREATMENT: CPT | Mod: HCNC

## 2023-05-03 PROCEDURE — 25000003 PHARM REV CODE 250: Mod: HCNC | Performed by: HOSPITALIST

## 2023-05-03 PROCEDURE — 99306 PR NURSING FACILITY CARE, INIT, HIGH SEVERITY: ICD-10-PCS | Mod: AI,HCNC,, | Performed by: HOSPITALIST

## 2023-05-03 PROCEDURE — 97530 THERAPEUTIC ACTIVITIES: CPT | Mod: HCNC,CQ

## 2023-05-03 PROCEDURE — 97116 GAIT TRAINING THERAPY: CPT | Mod: HCNC,CQ

## 2023-05-03 PROCEDURE — 25000003 PHARM REV CODE 250: Mod: HCNC | Performed by: NURSE PRACTITIONER

## 2023-05-03 RX ORDER — FUROSEMIDE 40 MG/1
40 TABLET ORAL ONCE
Status: COMPLETED | OUTPATIENT
Start: 2023-05-03 | End: 2023-05-03

## 2023-05-03 RX ADMIN — ACETAMINOPHEN 1000 MG: 500 TABLET ORAL at 08:05

## 2023-05-03 RX ADMIN — HYDROCORTISONE: 1 CREAM TOPICAL at 09:05

## 2023-05-03 RX ADMIN — IPRATROPIUM BROMIDE AND ALBUTEROL SULFATE 3 ML: 2.5; .5 SOLUTION RESPIRATORY (INHALATION) at 07:05

## 2023-05-03 RX ADMIN — FERROUS SULFATE TAB 325 MG (65 MG ELEMENTAL FE) 1 EACH: 325 (65 FE) TAB at 08:05

## 2023-05-03 RX ADMIN — ASPIRIN 81 MG: 81 TABLET, COATED ORAL at 08:05

## 2023-05-03 RX ADMIN — MICONAZOLE NITRATE: 20 OINTMENT TOPICAL at 09:05

## 2023-05-03 RX ADMIN — FUROSEMIDE 40 MG: 40 TABLET ORAL at 08:05

## 2023-05-03 RX ADMIN — DICLOFENAC SODIUM 2 G: 10 GEL TOPICAL at 04:05

## 2023-05-03 RX ADMIN — SILVER SULFADIAZINE: 10 CREAM TOPICAL at 09:05

## 2023-05-03 RX ADMIN — GUAIFENESIN AND DEXTROMETHORPHAN 10 ML: 100; 10 SYRUP ORAL at 08:05

## 2023-05-03 RX ADMIN — HYDROCORTISONE: 1 CREAM TOPICAL at 11:05

## 2023-05-03 RX ADMIN — FLUTICASONE PROPIONATE 100 MCG: 50 SPRAY, METERED NASAL at 08:05

## 2023-05-03 RX ADMIN — SILVER SULFADIAZINE: 10 CREAM TOPICAL at 11:05

## 2023-05-03 RX ADMIN — FUROSEMIDE 40 MG: 40 TABLET ORAL at 05:05

## 2023-05-03 RX ADMIN — DICLOFENAC SODIUM 2 G: 10 GEL TOPICAL at 11:05

## 2023-05-03 RX ADMIN — DICLOFENAC SODIUM 2 G: 10 GEL TOPICAL at 09:05

## 2023-05-03 RX ADMIN — GUAIFENESIN AND DEXTROMETHORPHAN 10 ML: 100; 10 SYRUP ORAL at 04:05

## 2023-05-03 RX ADMIN — PRAVASTATIN SODIUM 40 MG: 20 TABLET ORAL at 08:05

## 2023-05-03 RX ADMIN — THERA TABS 1 TABLET: TAB at 08:05

## 2023-05-03 RX ADMIN — Medication 400 UNITS: at 08:05

## 2023-05-03 RX ADMIN — MICONAZOLE NITRATE: 20 OINTMENT TOPICAL at 11:05

## 2023-05-03 RX ADMIN — Medication 250 MG: at 08:05

## 2023-05-03 RX ADMIN — ZINC SULFATE 220 MG (50 MG) CAPSULE 220 MG: CAPSULE at 08:05

## 2023-05-03 RX ADMIN — DICLOFENAC SODIUM 2 G: 10 GEL TOPICAL at 01:05

## 2023-05-03 RX ADMIN — FLUCONAZOLE 200 MG: 200 TABLET ORAL at 08:05

## 2023-05-03 NOTE — PLAN OF CARE
Problem: Adult Inpatient Plan of Care  Goal: Plan of Care Review  Outcome: Ongoing, Progressing  Goal: Patient-Specific Goal (Individualized)  Outcome: Ongoing, Progressing  Goal: Absence of Hospital-Acquired Illness or Injury  Outcome: Ongoing, Progressing  Goal: Optimal Comfort and Wellbeing  Outcome: Ongoing, Progressing  Goal: Readiness for Transition of Care  Outcome: Ongoing, Progressing     Problem: Diabetes Comorbidity  Goal: Blood Glucose Level Within Targeted Range  Outcome: Ongoing, Progressing     Problem: Fluid Imbalance (Pneumonia)  Goal: Fluid Balance  Outcome: Ongoing, Progressing     Problem: Infection (Pneumonia)  Goal: Resolution of Infection Signs and Symptoms  Outcome: Ongoing, Progressing     Problem: Respiratory Compromise (Pneumonia)  Goal: Effective Oxygenation and Ventilation  Outcome: Ongoing, Progressing     Problem: Impaired Wound Healing  Goal: Optimal Wound Healing  Outcome: Ongoing, Progressing     Problem: Fall Injury Risk  Goal: Absence of Fall and Fall-Related Injury  Outcome: Ongoing, Progressing     Problem: Skin Injury Risk Increased  Goal: Skin Health and Integrity  Outcome: Ongoing, Progressing  Pt admitted to Skilled Nursing for Pneumonia, unspecified organism. Patient is AA0X4. Receiving daily PT/OT for strengthening, balance, gait training and ambulation. Pt currently requiring  1,2,3,4: 1 person assistance,for transfers and ambulation. Patient also requiring 1 person assistance with dressing and set up for grooming and eating. Daily skilled nursing interventions include pain management, medication management, surgical wound management and ADL assistance. Currently pain is being managed by Pain medications prescribed. and rates pain PAIN 0-10: 1. Pt is on a Diet (All options): Cardiac diet, tolerating well. Care plan reviewed and updated. No complaints at this time, will continue to update care and monitor.

## 2023-05-03 NOTE — PT/OT/SLP PROGRESS
Occupational Therapy   Treatment    Name: Jenniffer Jimenez  MRN: [id number]  Admit Date: 4/27/2023  Admitting Diagnosis:  Acute on chronic diastolic heart failure    General Precautions: Standard, fall, hearing impaired   Orthopedic Precautions: N/A   Braces: N/A    Recommendations:     Discharge Recommendations:  assisted living facility  Level of Assistance Recommended at Discharge: Intermittent assistance for ADL's and homemaking tasks  Discharge Equipment Recommendations: none  Barriers to discharge:  None    Assessment:     Jenniffer Jimenez is a 89 y.o. female with a medical diagnosis of Acute on chronic diastolic heart failure.  She presents with  Performance deficits affecting function are weakness, impaired endurance, impaired self care skills, impaired functional mobility, gait instability, impaired balance, decreased upper extremity function, decreased lower extremity function, decreased ROM, impaired cardiopulmonary response to activity.     Pt. Was cooperative and participated well with session on this day. Pt  continues to demonstrate levels of physical deficits with  functional indep with daily management activities tasks, selfcare skills with balance,  functional mobility, UB strength and endurance. Pt. Will continue to benefit from continued OT to progress towards goals      Rehab Potential is fair    Activity tolerance:  Fair    Plan:     Patient to be seen 5 x/week to address the above listed problems via self-care/home management, therapeutic activities, therapeutic exercises    Plan of Care Expires: 05/19/23  Plan of Care Reviewed with: patient    Subjective     Communicated with: nsg and Pt. prior to session.  I am doing well today    Pain/Comfort:  Pain Rating 1: 0/10  Pain Rating Post-Intervention 1: 0/10    Patient's cultural, spiritual, Sikhism conflicts given the current situation:  yes    Objective:     Patient found up in chair with  PTA   upon OT entry to room.    Bed Mobility:     Not tested     Functional Mobility/Transfers:  Patient completed Sit <> Stand Transfer with contact guard assistance  with  rolling walker       Activities of Daily Living:  Pt. Already dressed     [facility] 6 Click ADL: 17    OT Exercises: UE Ergometer 10 min    Treatment & Education:  Pt. With standing act on this day with task. Pt. With CGA/SBA for balance aspects with task with  AD at raised counter Pt with visual perception task with discrimination of various shapes and sizes x 4:45 min/sec with standing bal and min cues through out with weight shifting and use of BUE's incorporated and crossing mid line and facilitation with posture in prep for home management .     Pt. With 2# dowel activity with 2x20 reps with  AAROM at E due to limitations shd flex, bicep curls horz adb/add and forward flex motion to increase BUE ROM and strength.    Pt. With therex performed to increase ROM, endurance selfcare task and fxl mobility for independence     Pt edu on role of OT, POC, safety when performing self care tasks , benefit of performing OOB activity, and safety when performing functional transfers and mobility management for preparation with goals to progress towards next level of care     Patient left up in chair with    present    GOALS:   Multidisciplinary Problems       Occupational Therapy Goals          Problem: Occupational Therapy    Goal Priority Disciplines Outcome Interventions   Occupational Therapy Goal     OT, PT/OT Ongoing, Progressing    Description: Goals to be met by: 5/19/23     Patient will increase functional independence with ADLs by performing:    UE Dressing with Stand-by Assistance.  LE Dressing with Stand-by Assistance and Assistive Devices as needed.  Grooming while standing at sink with Supervision.  Toileting from toilet with Supervision for hygiene and clothing management.   Bathing from  standing at sink with Stand-by Assistance.  Supine to sit with Supervision.  Step  transfer with Supervision  Upper extremity exercise program with assistance as needed.                         Time Tracking:     OT Date of Treatment: 05/03/23  OT Start Time: 1010    OT Stop Time: 1048  OT Total Time (min): 38 min    Billable Minutes:Therapeutic Activity 38    5/3/2023

## 2023-05-03 NOTE — PT/OT/SLP PROGRESS
"Physical Therapy Treatment    Patient Name:  Jenniffer Jimenez   MRN:  [id number]  Admit Date: 4/27/2023  Admitting Diagnosis: Acute on chronic diastolic heart failure    General Precautions: Standard, fall, hearing impaired  Orthopedic Precautions: N/A  Braces: N/A    Recommendations:     Discharge Recommendations: assisted living facility  Level of Assistance Recommended at Discharge: 24 hours light assistance  Discharge Equipment Recommendations: none  Barriers to discharge: None    Assessment:     Jenniffer Jimenez is a 89 y.o. female admitted with a medical diagnosis of Acute on chronic diastolic heart failure.  Pt was agreeablea nd toelrated sesison well. Pt completed bed mobility, gait training, curb training, and therex today. Occasional rest breaks and cues for required for technique and safety. Pt demonstrated less posterior leaning with curb training, but 2nd person present for safety. Pt is progressing well and continues to benefit from therapy to improve functional endurance, strength, and mobility.       Performance deficits affecting function: weakness, impaired endurance, impaired self care skills, impaired functional mobility, gait instability, impaired balance, decreased upper extremity function, decreased lower extremity function, decreased ROM, impaired cardiopulmonary response to activity.    Rehab Potential is good    Activity Tolerance: Good    Plan:     Patient to be seen 5 x/week to address the above listed problems via gait training, therapeutic activities, therapeutic exercises, neuromuscular re-education, wheelchair management/training    Plan of Care Expires: 05/28/23  Plan of Care Reviewed with: patient    Subjective     "I need my lasix" nurse notified.     Pain/Comfort:  Pain Rating 1: 0/10    Patient's cultural, spiritual, Pentecostal conflicts given the current situation:  yes    Objective:     Patient found HOB elevated with  (no lines) upon PT entry to room. Daughter " "present.    Therapeutic Activities and Exercises:   Increase time assisted with changing clothes.   Seated BLE therex x10 reps: heel/toe raises, LAQ, marching   Mini elliptical x10 mins (light resistance)  To improve BLE endurance, strength, and ROM  Patient educated on role of therapy, goals of session, and benefits of out of bed mobility.   Instructed on use of call button and importance of calling nursing staff for assistance with mobility   Questions/concerns addressed within PTA scope of practice  Pt verbalized understanding.    Functional Mobility:  Bed Mobility:   Scooting to EOB: contact guard assistance  Supine to Sit: contact guard assistance; HOB flat and HOB elevated  2 trials. Increase time with HOB flat   Sit to Supine: contact guard assistance  Transfers:   Sit <> Stand Transfer: contact guard assistance with rolling walker   Cues to scoot forward and hand placement for safety   Bed <> Chair Transfer: contact guard assistance with rolling walker using Stand Pivot technique   Gait:  Pt ambulated ~160 ft with  Everardo-CGA  and rolling walker. Wheelchair following   2 standing rest breaks & occasional lateral instability to the left  Included 2 trials ambulating over uneven blue mat   Gait Deviation(s): occasional unsteady gait with decrease marco, decrease step length, narrow ROSETTA, and downward gaze.   Verbal/tactile cues for upright posture and RW management when turning   Wheelchair Propulsion:    Pt propelled Standard wheelchair x ~70 feet on Level tile with  Bilateral upper extremity with Minimal Assistance.   Pt tends to veer left, Everardo to steer straight  Curb:  Pt ascended/descended 4" curb step with Rolling Walker with Minimal Assistance.   Cues for sequencing and RW management   Not posterior lean noted today  2nd person present for safety     AM-PAC 6 CLICK MOBILITY  16    Patient left  up in wheelchair in therapy gym  with  ROMERO present for hand-off.    GOALS:   Multidisciplinary Problems       " Physical Therapy Goals          Problem: Physical Therapy    Goal Priority Disciplines Outcome Goal Variances Interventions   Physical Therapy Goal     PT, PT/OT Ongoing, Progressing     Description: Goals to be met by: 5/28/23     Patient will increase functional independence with mobility by performing:    . Supine to sit with Set-up [place]  . Sit to supine with Set-up [place]  . Rolling to Left and Right with Modified [place].  . Sit to stand transfer with Supervision  . Bed to chair transfer with Supervision using Rolling Walker  . Gait  x 150 feet with Stand-by Assistance using Rolling Walker.   . Wheelchair propulsion x150 feet with Stand-by Assistance using bilateral uppper extremities  . Ascend/Descend 4 inch curb step with Contact Guard Assistance using Rolling Walker.                         Time Tracking:     PT Received On: 05/03/23  PT Start Time: 0924  PT Stop Time: 1007  PT Total Time (min): 43 min    Billable Minutes: Gait Training 15, Therapeutic Activity 13, and Therapeutic Exercise 15    Treatment Type: Treatment  PT/PTA: PTA     Number of PTA visits since last PT visit: 4     05/03/2023

## 2023-05-03 NOTE — H&P
"Hospital Medicine  Skilled Nursing Facility   History and Physical Exam      Date of Service: 05/03/2023      Patient Name: Jenniffer Jimenez  MRN: [id number]  Admission Date: 4/27/2023  Attending Physician: Gaurav Fu MD  Primary Care Provider: Rubi Young DO  Code Status: Full Code      Principal problem:  Acute on chronic diastolic heart failure      Chief Complaint:   Chief Complaint   Patient presents with    Congestive Heart Failure     Admitted to [facility] for rehabilitation following hospital stay for acute on chronic heart failure, BLE cellulitis, and pneumonia.           HPI:   "Jenniffer Jimenez is a 89 y.o. female with PMH of cataract, [place], CKD, DM II, diabetic polyneuropathy, HTN, PAF, TIA presents to the ED For evaluation of fever. Admitted for AFib RVR and presumed cellulitis to right lower extremity. She was treated with IV Cardizem and IV lasix. Beta-blockers are listed as allergies. Cellulitis treated with IV vancomycin and Zosyn transitioned to Amoxicillin and doxycycline stop date 4/30. Plan to continue aggressive wound care. Arrange for follow up with [facility], Cardiology, and Wound Care clinic. Followup with Dr. Deshpande after discharge.      Patient will be treated at [facility] with PT and OT to improve functional status and ability to perform ADLs.      Interval History  All of today's labs reviewed and are listed below.  24 hr vital sign ranges listed below.  Patient endorses some shortness of breath and wheezing. Denies abdominal discomfort, nausea, or vomiting.  Daughter at bedside. Discussed plan with her and patient. Crackles noted at bases. Give additional dose of Lasix 20mg PO now." Per Rani Schultz NP on 4/28/23.     She is doing okay today. Says that her legs are feeling better. Wound continues to have some drainage, but seems less. She notes that she is having some increased shortness of breath with exertion and feels that she needs an extra dose of furosemide " this afternoon. She is working more with therapy and feeling stronger.     Patient admitted with skilled services with PT and OT to improve functional status and ability to perform ADLs.       Past Medical History:   Past Medical History:   Diagnosis Date    Amblyopia of left eye 04/10/2013    Arthritis     Facet arthropathy, Lumbosacral    Atherosclerosis of aorta     Cataract     Central retinal vein occlusion of left eye     CKD (chronic kidney disease) stage 3, GFR 30-59 ml/min     Diabetes mellitus, type 2     Diabetic polyneuropathy 2022    Essential (primary) hypertension     Exotropia of both eyes 2013    recession RSR 5.0mm w/ adj; recession LR os 5.0 w/ adj; resect MR os  4.0mm    Hearing loss     History of resection of small bowel     Hypertensive retinopathy of both eyes     Hypoglycemia     Macular degeneration     OA (osteoarthritis) of shoulder     Right    Osteoporosis     Paroxysmal atrial fibrillation     Posterior vitreous detachment of both eyes     Psychiatric problem     Rhinitis     TIA (transient ischemic attack)        Past Surgical History:   Past Surgical History:   Procedure Laterality Date    APPENDECTOMY      CARDIAC CATHETERIZATION      CATARACT EXTRACTION W/  INTRAOCULAR LENS IMPLANT Bilateral      SECTION, CLASSIC      CLOSURE OF LEFT ATRIAL APPENDAGE USING DEVICE N/A 2020    Procedure: Left atrial appendage closure device;  Surgeon: Abundio Curtis MD;  Location: [facility] CATH LAB;  Service: Cardiology;  Laterality: N/A;    HYSTERECTOMY      INNER EAR SURGERY      JOINT REPLACEMENT      LEFT KNEE REPLACEMENT IN  -    OOPHORECTOMY      SINUS SURGERY      STRABISMUS SURGERY  13    RSR recession 5 mm, LLR recession 5 mm and LMR resection 4mm    STRABISMUS SURGERY  2014    recess LR OD 6mm    TONSILLECTOMY      watchman surgery N/A 2020       Social History:   Tobacco Use    Smoking status: Former     Types: Cigarettes     Quit date: 10/29/1982      Years since quittin.5     Passive exposure: Never    Smokeless tobacco: Never   Substance and Sexual Activity    Alcohol use: Not Currently     Alcohol/week: 2.0 standard drinks     Types: 2 Shots of liquor per week     Comment: rare    Drug use: No    Sexual activity: Never       Family History:   Family History       Problem Relation (Age of Onset)    Breast cancer Maternal Aunt    Diabetes Sister, Brother    Heart disease Sister, Sister    Hypertension Mother, Father    Liver disease Sister    No Known Problems Maternal Uncle, Paternal Aunt, Paternal Uncle, Maternal Grandmother, Maternal Grandfather, Paternal Grandmother, Paternal Grandfather, Daughter, Son, Son, Son            No current facility-administered medications on file prior to encounter.     Current Outpatient Medications on File Prior to Encounter   Medication Sig    acetaminophen (TYLENOL) 500 MG tablet Take 1,000 mg by mouth 2 (two) times a day.    albuterol-ipratropium (DUO-NEB) 2.5 mg-0.5 mg/3 mL nebulizer solution Take 3 mLs by nebulization every 6 (six) hours as needed for Wheezing or Shortness of Breath. Rescue    amoxicillin-clavulanate 875-125mg (AUGMENTIN) 875-125 mg per tablet Take 1 tablet by mouth every 12 (twelve) hours. End 23    aspirin (ECOTRIN) 81 MG EC tablet Take 1 tablet (81 mg total) by mouth once daily.    dextromethorphan-guaiFENesin  mg/5 ml (ROBITUSSIN-DM)  mg/5 mL liquid Take 5 mLs by mouth every 4 (four) hours as needed (cough).    diclofenac sodium (VOLTAREN) 1 % Gel Apply 2 g topically 4 (four) times daily. Apply to right hip    doxycycline (VIBRA-TABS) 100 MG tablet Take 1 tablet (100 mg total) by mouth every 12 (twelve) hours. End 23    ferrous sulfate 325 (65 FE) MG EC tablet Take 1 tablet (325 mg total) by mouth once daily.    fluconazole (DIFLUCAN) 200 MG Tab Take 1 tablet (200 mg total) by mouth once daily. End 23    fluticasone propionate (FLONASE) 50 mcg/actuation nasal spray 2  "sprays (100 mcg total) by Each Nostril route once daily.    furosemide (LASIX) 40 MG tablet Take 1 tablet (40 mg total) by mouth once daily.    miconazole nitrate 2% (MICOTIN) 2 % Oint Apply topically 2 (two) times daily. Coloplast Clear Antifungal ointment- (green top)- nursing to apply to perineum, inner thighs, pannus, and buttocks BID    pravastatin (PRAVACHOL) 40 MG tablet Take 1 tablet (40 mg total) by mouth once daily.    propylene glycol (SYSTANE COMPLETE OPHT) Apply to eye.    silver sulfADIAZINE 1% (SILVADENE) 1 % cream Apply to RLE skin breakdown twice daily (Patient taking differently: Apply topically 2 (two) times daily. Apply to RLE skin breakdown twice daily)    vitamin E 400 UNIT capsule Take 400 Units by mouth once daily.       Allergies:   Review of patient's allergies indicates:   Allergen Reactions    Opioids - morphine analogues Other (See Comments)     Bowel issues; bowel obstruction    Tizanidine Other (See Comments)     "Lips were numb,  Almost passed out."    Tramadol Hallucinations    Beta-blockers (beta-adrenergic blocking agts) Other (See Comments)     Can not go on beta blockers for long period of time - due to taking allergy injections    Morphine     Opioids-meperidine and related     Ciprofloxacin Rash       ROS:  Review of Systems   Constitutional:  Negative for appetite change and fever.   Respiratory:  Positive for shortness of breath (with exertion). Negative for cough.    Cardiovascular:  Positive for leg swelling. Negative for chest pain and palpitations.   Gastrointestinal:  Negative for abdominal pain, nausea and vomiting.   Genitourinary:  Negative for difficulty urinating and dysuria.   Musculoskeletal:  Negative for arthralgias and back pain.   Skin:  Positive for wound.   Neurological:  Positive for weakness. Negative for dizziness and light-headedness.   Psychiatric/Behavioral:  Negative for sleep disturbance. The patient is not nervous/anxious.        Objective:  Temp:  " [97.4 °F (36.3 °C)-98.3 °F (36.8 °C)]   Pulse:  [76-98]   Resp:  [16-20]   BP: (116-117)/(58-69)   SpO2:  [96 %-99 %]     Body mass index is 26.52 kg/m².      Physical Exam  Vitals and nursing note reviewed.   Constitutional:       General: She is not in acute distress.     Appearance: She is well-developed.   Cardiovascular:      Rate and Rhythm: Normal rate and regular rhythm.      Heart sounds: S1 normal and S2 normal. No murmur heard.  Pulmonary:      Effort: Pulmonary effort is normal. No respiratory distress.      Breath sounds: Normal breath sounds. No wheezing or rales.   Abdominal:      General: Bowel sounds are normal. There is no distension.      Palpations: Abdomen is soft.      Tenderness: There is no abdominal tenderness.   Musculoskeletal:         General: No tenderness.      Right lower le+ Pitting Edema present.      Left lower leg: No edema.   Skin:     General: Skin is warm and dry.      Findings: Bruising present.   Neurological:      Mental Status: She is alert and oriented to person, place, and time.   Psychiatric:         Mood and Affect: Mood normal.         Behavior: Behavior normal. Behavior is cooperative.         Thought Content: Thought content normal.       Significant Labs:   A1C:   Recent Labs   Lab 23  0417 23  1005   HGBA1C 6.0* 5.9*     TSH:   Recent Labs   Lab 23  1532   TSH 1.647     CBC:  Recent Labs   Lab 23  0445   WBC 4.86   HGB 7.6*   HCT 24.1*      MCV 88     BMP:  Recent Labs   Lab 23  0445   GLU 88      K 4.9      CO2 25   BUN 34*   CREATININE 1.3   CALCIUM 8.5*   MG 1.9   PHOS 4.2         Significant Imaging: I have reviewed all pertinent imaging results/findings completed during prior hospitalization.      Assessment and Plan:  Active Diagnoses:    Diagnosis Date Noted POA    PRINCIPAL PROBLEM:  Acute on chronic diastolic heart failure [I50.33] 2016 Yes    Community acquired pneumonia of left lower lobe of lung  [J18.9] 04/26/2023 Yes    Heart failure with preserved ejection fraction [I50.30] 04/24/2023 Yes    Microcytic anemia [D50.9] 04/22/2023 Yes    Polyneuropathy in diseases classified elsewhere [G63] 03/30/2023 Yes    Pulmonary hypertension [I27.20] 01/26/2023 Yes    Multiple open wounds of right lower extremity [S81.801A] 11/08/2022 Yes    Diabetic polyneuropathy [E11.42] 01/06/2022 Yes    Presence of Watchman left atrial appendage closure device [Z95.818] 12/15/2020 Yes     Chronic    Venous insufficiency of both lower extremities [I87.2] 11/22/2019 Yes    History of TIA (transient ischemic attack) [Z86.73] 05/04/2016 Not Applicable    Stage 3b chronic kidney disease [N18.32] 05/04/2016 Yes    Chronic a-fib [I48.20] 01/29/2016 Yes    Meniere's disease [H81.09] 04/02/2014 Yes    Debility [R53.81] 04/25/2013 Yes    Primary hypertension [I10]  Yes     Chronic    Pure hypercholesterolemia [E78.00] 07/02/2012 Yes     Chronic      Problems Resolved During this Admission:       Acute on chronic diastolic heart failure  - last echo from 10/07/2022 with EF of 55%  - monitor Is&Os and daily weights.    - Fluid restriction of 1.5 L, cardiac diet  - Continue furosemide 40mg daily   - Will give additional dose of furosemide 40 mg this afternoon.      Cellulitis BLE  Chronic lower extremity wounds  - completed Amoxicillin and Doxycycline on 4/30  - Bob supplements, zinc, multivitamin, ascorbic acid, continue vitamin-E  - ambulatory referral placed for Dermatology for patient to have further evaluation.     Community acquired pneumonia of left lower lobe of lung  Productive cough  - completed Amoxicillin and Doxycycline x 4 days  - Continue Mucinex DM      Stage 3b chronic kidney disease  - sCr baseline around 1.2  - continue to monitor twice weekly BMPs  - avoid nephrotoxic agents  - renally dose medications when appropriate.     Chronic a-fib  Presence of Watchman left atrial appendage closure device  - No need for  anticoagulation  - [id number] Score: 4.   - Continue ASA only   -  Beta blocker on allergy list      Microcytic anemia  - continue ferrous sulfate daily  - continue to monitor twice weekly CBC  - Transfuse if Hgb < 7 or symptomatic      Intertrigo  - Continue fluconazole 200 mg daily to complete 14 day course  - referral placed to Dermatology for patient to have further evaluation      Venous insufficiency of both lower extremities  - continue ASA, pravastatin 40 mg daily     Prediabetes  Lab Results   Component Value Date    HGBA1C 5.9 (H) 04/28/2023   - cardiac diet  - Accu-Cheks AC/HS     History of TIA (transient ischemic attack)  - Continue ASA 81 mg daily, pravastatin     Pure hypercholesterolemia  - continue pravastatin 40 mg daily     Debility   - Continue with PT/OT for gait training and strengthening and restoration of ADL's   - Encourage mobility, OOB in chair, and early ambulation as appropriate  - Fall precautions   - Monitor for bowel and bladder dysfunction  - Monitor for and prevent skin breakdown and pressure ulcers  - Continue DVT prophylaxis with frequent ambulation       Anticipated Disposition:  Home with home health      Future Appointments   Date Time Provider Department Center   5/4/2023  3:00 PM KIMBERLY Lopez [facility] AUDIO [facility]   5/5/2023  1:00 PM Coyd Cox MD [facility] WOUND [facility]   6/15/2023 11:15 AM Lennie Louis DPM [facility] POD [facility]   6/29/2023  8:30 AM Rachael Case MD [facility] IM [place]   7/12/2023 11:30 AM Brent Chapman Jr., MD [facility] CARDIO [place]       I certify that SNF services are required to be given on an inpatient basis because Jenniffer Jimenez needs for skilled nursing care and/or skilled rehabilitation are required on a daily basis and such services can only practically be provided in a skilled nursing facility setting and are for an ongoing condition for which she received inpatient care in the hospital.       Gaurav Fu,  MD  Department of Hospital Medicine   [facility] - Skilled Nursing

## 2023-05-03 NOTE — TELEPHONE ENCOUNTER
Daughter left voicemail on nurse line. With concerns about her mother's care at skilled facility. Called daughter back no answer voicemail full. Will try to call daughter again

## 2023-05-04 ENCOUNTER — TELEPHONE (OUTPATIENT)
Dept: WOUND CARE | Facility: HOSPITAL | Age: 88
End: 2023-05-04
Payer: MEDICARE

## 2023-05-04 LAB
ANION GAP SERPL CALC-SCNC: 9 MMOL/L (ref 8–16)
BASOPHILS # BLD AUTO: 0.03 K/UL (ref 0–0.2)
BASOPHILS NFR BLD: 0.7 % (ref 0–1.9)
BNP SERPL-MCNC: 166 PG/ML (ref 0–99)
BUN SERPL-MCNC: 45 MG/DL (ref 10–30)
CALCIUM SERPL-MCNC: 8.7 MG/DL (ref 8.7–10.5)
CHLORIDE SERPL-SCNC: 102 MMOL/L (ref 95–110)
CO2 SERPL-SCNC: 27 MMOL/L (ref 23–29)
CREAT SERPL-MCNC: 1.4 MG/DL (ref 0.5–1.4)
DIFFERENTIAL METHOD: ABNORMAL
EOSINOPHIL # BLD AUTO: 0.1 K/UL (ref 0–0.5)
EOSINOPHIL NFR BLD: 2.4 % (ref 0–8)
ERYTHROCYTE [DISTWIDTH] IN BLOOD BY AUTOMATED COUNT: 19 % (ref 11.5–14.5)
EST. GFR  (NO RACE VARIABLE): 35.5 ML/MIN/1.73 M^2
GLUCOSE SERPL-MCNC: 87 MG/DL (ref 70–110)
HCT VFR BLD AUTO: 24.2 % (ref 37–48.5)
HGB BLD-MCNC: 7.5 G/DL (ref 12–16)
IMM GRANULOCYTES # BLD AUTO: 0.04 K/UL (ref 0–0.04)
IMM GRANULOCYTES NFR BLD AUTO: 0.9 % (ref 0–0.5)
LYMPHOCYTES # BLD AUTO: 0.9 K/UL (ref 1–4.8)
LYMPHOCYTES NFR BLD: 20 % (ref 18–48)
MAGNESIUM SERPL-MCNC: 2.1 MG/DL (ref 1.6–2.6)
MCH RBC QN AUTO: 27 PG (ref 27–31)
MCHC RBC AUTO-ENTMCNC: 31 G/DL (ref 32–36)
MCV RBC AUTO: 87 FL (ref 82–98)
MONOCYTES # BLD AUTO: 0.5 K/UL (ref 0.3–1)
MONOCYTES NFR BLD: 12 % (ref 4–15)
NEUTROPHILS # BLD AUTO: 2.9 K/UL (ref 1.8–7.7)
NEUTROPHILS NFR BLD: 64 % (ref 38–73)
NRBC BLD-RTO: 0 /100 WBC
PHOSPHATE SERPL-MCNC: 5.1 MG/DL (ref 2.7–4.5)
PLATELET # BLD AUTO: 238 K/UL (ref 150–450)
PMV BLD AUTO: 10.4 FL (ref 9.2–12.9)
POCT GLUCOSE: 105 MG/DL (ref 70–110)
POCT GLUCOSE: 119 MG/DL (ref 70–110)
POCT GLUCOSE: 183 MG/DL (ref 70–110)
POCT GLUCOSE: 86 MG/DL (ref 70–110)
POTASSIUM SERPL-SCNC: 5.6 MMOL/L (ref 3.5–5.1)
RBC # BLD AUTO: 2.78 M/UL (ref 4–5.4)
SODIUM SERPL-SCNC: 138 MMOL/L (ref 136–145)
WBC # BLD AUTO: 4.49 K/UL (ref 3.9–12.7)

## 2023-05-04 PROCEDURE — 25000242 PHARM REV CODE 250 ALT 637 W/ HCPCS: Mod: HCNC | Performed by: HOSPITALIST

## 2023-05-04 PROCEDURE — 83735 ASSAY OF MAGNESIUM: CPT | Mod: HCNC | Performed by: HOSPITALIST

## 2023-05-04 PROCEDURE — 25000003 PHARM REV CODE 250: Mod: HCNC | Performed by: HOSPITALIST

## 2023-05-04 PROCEDURE — 85025 COMPLETE CBC W/AUTO DIFF WBC: CPT | Mod: HCNC | Performed by: HOSPITALIST

## 2023-05-04 PROCEDURE — 94668 MNPJ CHEST WALL SBSQ: CPT | Mod: HCNC

## 2023-05-04 PROCEDURE — 36415 COLL VENOUS BLD VENIPUNCTURE: CPT | Mod: HCNC | Performed by: NURSE PRACTITIONER

## 2023-05-04 PROCEDURE — 80048 BASIC METABOLIC PNL TOTAL CA: CPT | Mod: HCNC | Performed by: HOSPITALIST

## 2023-05-04 PROCEDURE — 97535 SELF CARE MNGMENT TRAINING: CPT | Mod: HCNC,CO

## 2023-05-04 PROCEDURE — 83880 ASSAY OF NATRIURETIC PEPTIDE: CPT | Mod: HCNC | Performed by: NURSE PRACTITIONER

## 2023-05-04 PROCEDURE — 36415 COLL VENOUS BLD VENIPUNCTURE: CPT | Mod: HCNC | Performed by: HOSPITALIST

## 2023-05-04 PROCEDURE — 11000004 HC SNF PRIVATE: Mod: HCNC

## 2023-05-04 PROCEDURE — 94761 N-INVAS EAR/PLS OXIMETRY MLT: CPT | Mod: HCNC

## 2023-05-04 PROCEDURE — 94640 AIRWAY INHALATION TREATMENT: CPT | Mod: HCNC

## 2023-05-04 PROCEDURE — 84100 ASSAY OF PHOSPHORUS: CPT | Mod: HCNC | Performed by: HOSPITALIST

## 2023-05-04 PROCEDURE — 25000003 PHARM REV CODE 250: Mod: HCNC | Performed by: NURSE PRACTITIONER

## 2023-05-04 RX ORDER — FUROSEMIDE 20 MG/1
20 TABLET ORAL DAILY
Status: DISCONTINUED | OUTPATIENT
Start: 2023-05-05 | End: 2023-05-12 | Stop reason: HOSPADM

## 2023-05-04 RX ADMIN — FERROUS SULFATE TAB 325 MG (65 MG ELEMENTAL FE) 1 EACH: 325 (65 FE) TAB at 08:05

## 2023-05-04 RX ADMIN — DICLOFENAC SODIUM 2 G: 10 GEL TOPICAL at 04:05

## 2023-05-04 RX ADMIN — Medication 400 UNITS: at 08:05

## 2023-05-04 RX ADMIN — DICLOFENAC SODIUM 2 G: 10 GEL TOPICAL at 09:05

## 2023-05-04 RX ADMIN — FLUTICASONE PROPIONATE 100 MCG: 50 SPRAY, METERED NASAL at 08:05

## 2023-05-04 RX ADMIN — FUROSEMIDE 40 MG: 40 TABLET ORAL at 08:05

## 2023-05-04 RX ADMIN — GUAIFENESIN AND DEXTROMETHORPHAN 10 ML: 100; 10 SYRUP ORAL at 08:05

## 2023-05-04 RX ADMIN — MICONAZOLE NITRATE: 20 OINTMENT TOPICAL at 09:05

## 2023-05-04 RX ADMIN — ASPIRIN 81 MG: 81 TABLET, COATED ORAL at 08:05

## 2023-05-04 RX ADMIN — FLUCONAZOLE 200 MG: 200 TABLET ORAL at 08:05

## 2023-05-04 RX ADMIN — SODIUM ZIRCONIUM CYCLOSILICATE 10 G: 5 POWDER, FOR SUSPENSION ORAL at 12:05

## 2023-05-04 RX ADMIN — Medication 250 MG: at 08:05

## 2023-05-04 RX ADMIN — THERA TABS 1 TABLET: TAB at 08:05

## 2023-05-04 RX ADMIN — ACETAMINOPHEN 1000 MG: 500 TABLET ORAL at 08:05

## 2023-05-04 RX ADMIN — SODIUM ZIRCONIUM CYCLOSILICATE 10 G: 5 POWDER, FOR SUSPENSION ORAL at 02:05

## 2023-05-04 RX ADMIN — GUAIFENESIN AND DEXTROMETHORPHAN 10 ML: 100; 10 SYRUP ORAL at 02:05

## 2023-05-04 RX ADMIN — ZINC SULFATE 220 MG (50 MG) CAPSULE 220 MG: CAPSULE at 08:05

## 2023-05-04 RX ADMIN — DICLOFENAC SODIUM 2 G: 10 GEL TOPICAL at 01:05

## 2023-05-04 RX ADMIN — PRAVASTATIN SODIUM 40 MG: 20 TABLET ORAL at 08:05

## 2023-05-04 RX ADMIN — HYDROCORTISONE: 1 CREAM TOPICAL at 09:05

## 2023-05-04 RX ADMIN — IPRATROPIUM BROMIDE AND ALBUTEROL SULFATE 3 ML: 2.5; .5 SOLUTION RESPIRATORY (INHALATION) at 07:05

## 2023-05-04 NOTE — TELEPHONE ENCOUNTER
Patient's daughter Afshan left voicemail that patient's legs are doing much better and she is canceling patient's wound care appointment tomorrow for 1 pm with  and will follow up when patient is discharged from skilled nursing facility.

## 2023-05-04 NOTE — PT/OT/SLP PROGRESS
Occupational Therapy   Treatment    Name: Jenniffer Jimenez  MRN: [id number]  Admit Date: 4/27/2023  Admitting Diagnosis:  Acute on chronic diastolic heart failure    General Precautions: Standard, fall, hearing impaired   Orthopedic Precautions: N/A   Braces: N/A    Recommendations:     Discharge Recommendations:  assisted living facility  Level of Assistance Recommended at Discharge: Intermittent assistance for ADL's and homemaking tasks  Discharge Equipment Recommendations: none  Barriers to discharge:  None    Assessment:     Jenniffer Jimenez is a 89 y.o. female with a medical diagnosis of Acute on chronic diastolic heart failure.  She presents with  Performance deficits affecting function are weakness, impaired endurance, impaired self care skills, impaired functional mobility, gait instability, impaired balance, decreased upper extremity function, decreased lower extremity function, decreased ROM, impaired cardiopulmonary response to activity.     Pt. Was cooperative and participated well with session on this day. Pt  continues to demonstrate levels of physical deficits with  functional indep with daily management activities tasks, selfcare skills with balance,  functional mobility, UB strength and endurance. Pt. Will continue to benefit from continued OT to progress towards goals      Rehab Potential is fair    Activity tolerance:  Fair    Plan:     Patient to be seen 5 x/week to address the above listed problems via self-care/home management, therapeutic activities, therapeutic exercises    Plan of Care Expires: 05/19/23  Plan of Care Reviewed with: patient    Subjective     Communicated with: sridhar prior to session. I left my hearing aids out of my ear they need to charge .    Pain/Comfort:  Pain Rating 1: 0/10  Pain Rating Post-Intervention 1: 0/10    Patient's cultural, spiritual, Sabianist conflicts given the current situation:  yes    Objective:     Patient found up in chair    upon OT entry to  room.    Bed Mobility:    Not tested     Functional Mobility/Transfers:  Patient completed Sit <> Stand Transfer with contact guard assistance  with  rolling walker   Functional Mobility: Pt. With fxl mobility to in room bath with RW and CGA for bal    Activities of Daily Living:  Grooming: supervision at sink level with grooming needs  Bathing: stand by assistance for UB portion only, wound care in to apply cream to Pt. BLE's  Upper Body Dressing: contact guard assistance to doff/wilberto fresh pull over shirt  Lower Body Dressing: moderate assistance to wilberto BLE feet into pants and Pt. With assisting to manage over hips instance with RW for bal.    [facility] 6 Click ADL: 17    Treatment & Education:    Pt edu on role of OT, POC, safety when performing self care tasks , benefit of performing OOB activity, and safety when performing functional transfers and mobility management for preparation with goals to progress towards next level of care     Patient left up in chair with all lines intact and call button in reach    GOALS:   Multidisciplinary Problems       Occupational Therapy Goals          Problem: Occupational Therapy    Goal Priority Disciplines Outcome Interventions   Occupational Therapy Goal     OT, PT/OT Ongoing, Progressing    Description: Goals to be met by: 5/19/23     Patient will increase functional independence with ADLs by performing:    UE Dressing with Stand-by Assistance.  LE Dressing with Stand-by Assistance and Assistive Devices as needed.  Grooming while standing at sink with Supervision.  Toileting from toilet with Supervision for hygiene and clothing management.   Bathing from  standing at sink with Stand-by Assistance.  Supine to sit with Supervision.  Step transfer with Supervision  Upper extremity exercise program with assistance as needed.                         Time Tracking:     OT Date of Treatment: 05/04/23  OT Start Time: 0844    OT Stop Time: 0914  OT Total Time (min): 30  min    Billable Minutes:Self Care/Home Management 30    5/4/2023

## 2023-05-04 NOTE — PROGRESS NOTES
[facility] ([place] - Skilled Nursing  Wound Care    Patient Name:  Jenniffer Jimenez   MRN:  [id number]  Date: 2023  Diagnosis: Acute on chronic diastolic heart failure    History:     Past Medical History:   Diagnosis Date    Amblyopia of left eye 04/10/2013    Arthritis     Facet arthropathy, Lumbosacral    Atherosclerosis of aorta     Cataract     Central retinal vein occlusion of left eye     CKD (chronic kidney disease) stage 3, GFR 30-59 ml/min     Diabetes mellitus, type 2     Diabetic polyneuropathy 2022    Essential (primary) hypertension     Exotropia of both eyes 2013    recession RSR 5.0mm w/ adj; recession LR os 5.0 w/ adj; resect MR os  4.0mm    Hearing loss     History of resection of small bowel     Hypertensive retinopathy of both eyes     Hypoglycemia     Macular degeneration     OA (osteoarthritis) of shoulder     Right    Osteoporosis     Paroxysmal atrial fibrillation     Posterior vitreous detachment of both eyes     Psychiatric problem     Rhinitis     TIA (transient ischemic attack)        Social History     Socioeconomic History    Marital status:    Occupational History    Occupation: retired   Tobacco Use    Smoking status: Former     Types: Cigarettes     Quit date: 10/29/1982     Years since quittin.5     Passive exposure: Never    Smokeless tobacco: Never   Substance and Sexual Activity    Alcohol use: Not Currently     Alcohol/week: 2.0 standard drinks     Types: 2 Shots of liquor per week     Comment: rare    Drug use: No    Sexual activity: Never   Other Topics Concern    Are you pregnant or think you may be? No    Breast-feeding No     Social Determinants of Health     Financial Resource Strain: Low Risk     Difficulty of Paying Living Expenses: Not very hard   Food Insecurity: No Food Insecurity    Worried About Running Out of Food in the Last Year: Never true    Ran Out of Food in the Last Year: Never true   Transportation Needs: No Transportation Needs     "Lack of Transportation (Medical): No    Lack of Transportation (Non-Medical): No   Physical Activity: Inactive    Days of Exercise per Week: 0 days    Minutes of Exercise per Session: 0 min   Stress: No Stress Concern Present    Feeling of Stress : Only a little   Social Connections: Moderately Isolated    Frequency of Communication with Friends and Family: Twice a week    Frequency of Social Gatherings with Friends and Family: Once a week    Attends Latter day Services: Never    Active Member of Clubs or Organizations: Yes    Attends Club or Organization Meetings: Never    Marital Status:    Housing Stability: Low Risk     Unable to Pay for Housing in the Last Year: No    Number of Places Lived in the Last Year: 1    Unstable Housing in the Last Year: No       Precautions:     Allergies as of 04/27/2023 - Reviewed 04/27/2023   Allergen Reaction Noted    Opioids - morphine analogues Other (See Comments) 08/15/2018    Tizanidine Other (See Comments) 04/06/2018    Tramadol Hallucinations 01/18/2013    Beta-blockers (beta-adrenergic blocking agts) Other (See Comments) 10/23/2013    Morphine  12/24/2021    Opioids-meperidine and related  01/04/2022    Ciprofloxacin Rash 12/28/2022       [facility] Assessment Details/Treatment   Wound care follow-up  The BLE skin is clearing, the skin is lighter red.  The right lower leg is slightly moist (dressing over lower leg removed), the left leg is dry, light red.   The groin, inner thighs and pannus skin is dry, less flaky, skin is intact, less itchy.    InterDry cloths placed in skin folds of abdomen, groin.   Discussed wound care, verbalized understanding.     Plan:  Continue antifungal ointment to the BLE, pannus, groin, inner thighs, perineal area BID/prn cleansing  Place InterDry cloths flat to the skin folds of abdominal pannus and groin area- change q 5 days- may rinse cloths, allow to dry then replace- use briefs/underwear to hold cloths in place with at least 2" " extending from skin folds.   Continue pressure prevention measures    Nursing to continue care, wound care will follow  Recommendations made to primary team for above plan per written report . Orders placed.      05/04/23 0730        Altered Skin Integrity 04/24/23 1136 Buttocks Moisture associated dermatitis Intact skin with non-blanchable redness of localized area   Date First Assessed/Time First Assessed: 04/24/23 1136   Altered Skin Integrity Present on Admission - Did Patient arrive to the hospital with altered skin?: yes  Location: (c) Buttocks  Is this injury device related?: No  Primary Wound Type: Moisture...   Wound Image    Dressing Appearance Open to air   Drainage Amount None   Appearance Pink;Red;Dry   Tissue loss description Not applicable   Periwound Area Intact;Dry   Wound Edges Rolled/closed   Care Cleansed with:;Antimicrobial agent;Applied:  (antifungal ointment applied)        Altered Skin Integrity Left lower;lateral Leg   No Date First Assessed or Time First Assessed found.   Altered Skin Integrity Present on Admission - Did Patient arrive to the hospital with altered skin?: yes  Side: Left  Orientation: lower;lateral  Location: Leg   Wound Image    Dressing Appearance Open to air   Drainage Amount None   Appearance Pink;Red;Dry  (skin peeling)   Tissue loss description Not applicable   Periwound Area Intact;Dry   Wound Edges Rolled/closed   Care Cleansed with:;Antimicrobial agent;Applied:;Skin Barrier  (miconazole ointment)        Altered Skin Integrity Right anterior;lower;lateral;medial;posterior Leg   No Date First Assessed or Time First Assessed found.   Altered Skin Integrity Present on Admission - Did Patient arrive to the hospital with altered skin?: yes  Side: Right  Orientation: anterior;lower;lateral;medial;posterior  Location: Leg   Wound Image     Dressing Appearance Intact   Drainage Amount Scant   Drainage Characteristics/Odor Clear   Appearance Pink;Red;Moist   Tissue loss  description Partial thickness   Periwound Area Intact;Satellite lesion;Redness;Moist   Wound Edges Jagged;Irregular   Care Cleansed with:;Antimicrobial agent;Applied:;Skin Barrier  (miconazole ointment)        Rash 04/24/23 1047 Left anterior Groin   Date of First Assessment/Time of First Assessment: 04/24/23 1047   Present Prior to Hospital Arrival?: Yes  Side: Left  Orientation: anterior  Location: Groin   Distribution regional   Configuration/Shape asymmetric   Characteristics redness/erythema   Color pink;red   Rash Care cleansed with;skin cleanser (specify);antifungal applied  (Vashe wound solution)        Rash 04/24/23 1047 Right anterior Groin   Date of First Assessment/Time of First Assessment: 04/24/23 1047   Present Prior to Hospital Arrival?: Yes  Side: Right  Orientation: anterior  Location: Groin   Distribution regional   Configuration/Shape asymmetric   Characteristics redness/erythema   Color pink;red   Rash Care cleansed with;skin cleanser (specify);antifungal applied  (Vashe wound solution)       05/04/2023

## 2023-05-04 NOTE — PLAN OF CARE
Problem: Adult Inpatient Plan of Care  Goal: Plan of Care Review  Outcome: Ongoing, Progressing  Goal: Patient-Specific Goal (Individualized)  Outcome: Ongoing, Progressing  Goal: Absence of Hospital-Acquired Illness or Injury  Outcome: Ongoing, Progressing  Goal: Optimal Comfort and Wellbeing  Outcome: Ongoing, Progressing  Goal: Readiness for Transition of Care  Outcome: Ongoing, Progressing     Problem: Diabetes Comorbidity  Goal: Blood Glucose Level Within Targeted Range  Outcome: Ongoing, Progressing     Problem: Fluid Imbalance (Pneumonia)  Goal: Fluid Balance  Outcome: Ongoing, Progressing     Problem: Infection (Pneumonia)  Goal: Resolution of Infection Signs and Symptoms  Outcome: Ongoing, Progressing     Problem: Respiratory Compromise (Pneumonia)  Goal: Effective Oxygenation and Ventilation  Outcome: Ongoing, Progressing     Problem: Impaired Wound Healing  Goal: Optimal Wound Healing  Outcome: Ongoing, Progressing     Problem: Fall Injury Risk  Goal: Absence of Fall and Fall-Related Injury  Outcome: Ongoing, Progressing     Problem: Skin Injury Risk Increased  Goal: Skin Health and Integrity  Outcome: Ongoing, Progressing     Problem: Adult Inpatient Plan of Care  Goal: Plan of Care Review  Outcome: Ongoing, Progressing  Goal: Patient-Specific Goal (Individualized)  Outcome: Ongoing, Progressing  Goal: Absence of Hospital-Acquired Illness or Injury  Outcome: Ongoing, Progressing  Goal: Optimal Comfort and Wellbeing  Outcome: Ongoing, Progressing  Goal: Readiness for Transition of Care  Outcome: Ongoing, Progressing     Problem: Diabetes Comorbidity  Goal: Blood Glucose Level Within Targeted Range  Outcome: Ongoing, Progressing     Problem: Fluid Imbalance (Pneumonia)  Goal: Fluid Balance  Outcome: Ongoing, Progressing     Problem: Infection (Pneumonia)  Goal: Resolution of Infection Signs and Symptoms  Outcome: Ongoing, Progressing     Problem: Respiratory Compromise (Pneumonia)  Goal: Effective  Oxygenation and Ventilation  Outcome: Ongoing, Progressing     Problem: Impaired Wound Healing  Goal: Optimal Wound Healing  Outcome: Ongoing, Progressing     Problem: Fall Injury Risk  Goal: Absence of Fall and Fall-Related Injury  Outcome: Ongoing, Progressing     Problem: Skin Injury Risk Increased  Goal: Skin Health and Integrity  Outcome: Ongoing, Progressing     Problem: Adult Inpatient Plan of Care  Goal: Plan of Care Review  Outcome: Ongoing, Progressing  Goal: Patient-Specific Goal (Individualized)  Outcome: Ongoing, Progressing  Goal: Absence of Hospital-Acquired Illness or Injury  Outcome: Ongoing, Progressing  Goal: Optimal Comfort and Wellbeing  Outcome: Ongoing, Progressing  Goal: Readiness for Transition of Care  Outcome: Ongoing, Progressing     Problem: Diabetes Comorbidity  Goal: Blood Glucose Level Within Targeted Range  Outcome: Ongoing, Progressing     Problem: Fluid Imbalance (Pneumonia)  Goal: Fluid Balance  Outcome: Ongoing, Progressing     Problem: Infection (Pneumonia)  Goal: Resolution of Infection Signs and Symptoms  Outcome: Ongoing, Progressing     Problem: Respiratory Compromise (Pneumonia)  Goal: Effective Oxygenation and Ventilation  Outcome: Ongoing, Progressing     Problem: Impaired Wound Healing  Goal: Optimal Wound Healing  Outcome: Ongoing, Progressing     Problem: Fall Injury Risk  Goal: Absence of Fall and Fall-Related Injury  Outcome: Ongoing, Progressing     Problem: Skin Injury Risk Increased  Goal: Skin Health and Integrity  Outcome: Ongoing, Progressing

## 2023-05-04 NOTE — PROGRESS NOTES
[facility]                                  Skilled Nursing Facility                   Progress Note     Admit Date: 4/27/2023  GLIES 5/18/2023  Principal Problem:  Acute on chronic diastolic heart failure   HPI obtained from patient interview and chart review     Chief Complaint: Re-evaluation of medical treatment and therapy status: Lab review    HPI:   Jenniffer Jimenez is a 89 y.o. female with PMHx of cataract, Circle, CKD, DM II, diabetic polyneuropathy, HTN, PAF, TIA presents to SNF following hospitalization for AFib RVR and presumed cellulitis to right lower extremity.     Interval history:  All of today's labs reviewed and are listed below.  Worsening renal function, BUN/creatinine  45/1.4 from 34/1.3, K 5.6.  Patient given additional 40 mEq of Lasix yesterday.  24 hr vital sign ranges listed below.  Patient denies shortness of breath, abdominal discomfort, nausea, or vomiting.  Patient reports an adequate appetite.  Patient denies dysuria.  Patient reports having regular bowel movements.  Patient progessing with PT/OT-ambulated ~160 ft with  Everardo-CGA  and rolling walker. Continuing to follow and treat all acute and chronic conditions. Spoke with wound care regarding pt. Derm apt obtained.     Past Medical History: Patient has a past medical history of Amblyopia of left eye (04/10/2013), Arthritis, Atherosclerosis of aorta, Cataract, Central retinal vein occlusion of left eye, CKD (chronic kidney disease) stage 3, GFR 30-59 ml/min, Diabetes mellitus, type 2, Diabetic polyneuropathy (01/06/2022), Essential (primary) hypertension, Exotropia of both eyes (02/06/2013), Hearing loss, History of resection of small bowel, Hypertensive retinopathy of both eyes, Hypoglycemia, Macular degeneration, OA (osteoarthritis) of shoulder, Osteoporosis, Paroxysmal atrial fibrillation, Posterior vitreous detachment of both eyes, Psychiatric problem,  Rhinitis, and TIA (transient ischemic attack).    Past Surgical History: Patient has a past surgical history that includes Cardiac catheterization; Inner ear surgery; Sinus surgery; Tonsillectomy;  section, classic; Appendectomy; Strabismus surgery (13); Strabismus surgery (2014); Cataract extraction w/  intraocular lens implant (Bilateral); Hysterectomy; Oophorectomy; Joint replacement; Closure of left atrial appendage using device (N/A, 2020); and watchman surgery (N/A, 2020).    Social History: Patient reports that she quit smoking about 40 years ago. Her smoking use included cigarettes. She has never been exposed to tobacco smoke. She has never used smokeless tobacco. She reports that she does not currently use alcohol after a past usage of about 2.0 standard drinks per week. She reports that she does not use drugs.    Family History: family history includes Breast cancer in her maternal aunt; Diabetes in her brother and sister; Heart disease in her sister and sister; Hypertension in her father and mother; Liver disease in her sister; No Known Problems in her daughter, maternal grandfather, maternal grandmother, maternal uncle, paternal aunt, paternal grandfather, paternal grandmother, paternal uncle, son, son, and son.    Allergies: Patient is allergic to opioids - morphine analogues, tizanidine, tramadol, beta-blockers (beta-adrenergic blocking agts), morphine, opioids-meperidine and related, and ciprofloxacin.    ROS  Constitutional: Negative for fever   Eyes: Negative for blurred vision, double vision   Respiratory: Negative for shortness of breath.  +productive cough   Cardiovascular: Negative for chest pain, palpitations.  + leg swelling.   Gastrointestinal: Negative for abdominal pain, constipation, diarrhea, nausea, vomiting.   Genitourinary: Negative for dysuria, frequency   Musculoskeletal:  + generalized weakness.  + for back pain and myalgias.   Skin:  + for itching and  rash.   Neurological: Negative for dizziness, headaches.   Psychiatric/Behavioral: Negative for depression. The patient is not nervous/anxious.      24 hour Vital Sign Range   Temp:  [97.5 °F (36.4 °C)-98.3 °F (36.8 °C)]   Pulse:  [75-87]   Resp:  [18-19]   BP: (119-138)/(56-63)   SpO2:  [96 %-99 %]     Current BMI: Body mass index is 26.52 kg/m².    PEx  Constitutional: Patient appears debilitated.  No distress noted  HENT:   Head: Normocephalic and atraumatic.   Eyes: Pupils are equal, round  Neck: Normal range of motion. Neck supple.   Cardiovascular: Normal rate, irregular rhythm and normal heart sounds.    Pulmonary/Chest: Effort normal and breath sounds are clear  Abdominal: Soft. Bowel sounds are normal.   Musculoskeletal: Normal range of motion.   Neurological: Alert and oriented to person, place, and time.   Psychiatric: Normal mood and affect. Behavior is normal.   Skin: Skin is warm and dry.       05/02/23 0900         Altered Skin Integrity 04/24/23 1136 Buttocks Moisture associated dermatitis Intact skin with non-blanchable redness of localized area   Date First Assessed/Time First Assessed: 04/24/23 1136   Altered Skin Integrity Present on Admission - Did Patient arrive to the hospital with altered skin?: yes  Location: (c) Buttocks  Is this injury device related?: No  Primary Wound Type: Moisture...   Dressing Appearance Open to air   Drainage Amount Scant   Drainage Characteristics/Odor Clear   Appearance Pink;Red;Moist   Tissue loss description Not applicable   Periwound Area Dry   Wound Edges Rolled/closed   Care Cleansed with:;Soap and water;Applied:;Skin Barrier;Other (see comments)  (miconazole ointment)   Dressing Change Due 05/02/23        Altered Skin Integrity Left lower;lateral Leg   No Date First Assessed or Time First Assessed found.   Altered Skin Integrity Present on Admission - Did Patient arrive to the hospital with altered skin?: yes  Side: Left  Orientation: lower;lateral  Location:  Leg   Dressing Appearance Dry;Intact;Clean   Drainage Amount Scant   Drainage Characteristics/Odor Clear   Appearance Red;Dry   Tissue loss description Not applicable   Periwound Area Intact;Dry;Excoriated;Redness  (miconazole ointment)   Care Cleansed with:;Antimicrobial agent;Applied:;Skin Barrier        Altered Skin Integrity Right anterior;lower;lateral;medial;posterior Leg   No Date First Assessed or Time First Assessed found.   Altered Skin Integrity Present on Admission - Did Patient arrive to the hospital with altered skin?: yes  Side: Right  Orientation: anterior;lower;lateral;medial;posterior  Location: Leg   Dressing Appearance Dry;Intact;Clean   Drainage Amount Scant   Drainage Characteristics/Odor Clear   Appearance Red;Dry   Tissue loss description Not applicable   Periwound Area Intact;Excoriated;Redness   Care Cleansed with:;Antimicrobial agent;Applied:;Skin Barrier  (micnoazole ointment)   Dressing Change Due 05/02/23        Rash 04/24/23 1047 Left anterior Groin   Date of First Assessment/Time of First Assessment: 04/24/23 1047   Present Prior to Hospital Arrival?: Yes  Side: Left  Orientation: anterior  Location: Groin   Distribution regional   Configuration/Shape asymmetric   Characteristics redness/erythema;pruritus/itching;burning   Color deep red;red        Rash 04/24/23 1047 Right anterior Groin   Date of First Assessment/Time of First Assessment: 04/24/23 1047   Present Prior to Hospital Arrival?: Yes  Side: Right  Orientation: anterior  Location: Groin   Distribution regional   Configuration/Shape asymmetric   Characteristics redness/erythema;pruritus/itching;burning         Recent Labs   Lab 05/04/23  0459      K 5.6*      CO2 27   BUN 45*   CREATININE 1.4   MG 2.1         Recent Labs   Lab 05/04/23  0459   WBC 4.49   RBC 2.78*   HGB 7.5*   HCT 24.2*      MCV 87   MCH 27.0   MCHC 31.0*         Recent Labs   Lab 05/02/23  1633 05/02/23 2014 05/03/23  0702 05/03/23  1218  "05/03/23 2124 05/04/23  0716   POCTGLUCOSE 122* 174* 96 103 150* 105        Assessment and Plan:    Acute on chronic diastolic heart failure  - last echo from 10/07/2022 with EF of 55%  - monitor Is&Os and daily weights.    - Fluid restriction of 1.5 L, cardiac diet  - ordered for a BNP today, reduce Lasix 20mg daily     Stage 3b chronic kidney disease  - sCr baseline around 1.2  - unstable, BUN/creatinine trending upward, decreasing Lasix 20 mg daily, repeat BMP tomorrow continue to monitor twice weekly BMPs, avoid nephrotoxic agents, renally dose medications when appropriate.      Hyperkalemia  - initiated Lokelma 10 mg q.4 hours x2 doses, repeat BMP tomorrow    Cellulitis BLE  Chronic lower extremity wounds  - completed Amoxicillin and Doxycycline x 4 days end date 4/30  - Bob supplements, zinc, multivitamin, ascorbic acid, continue vitamin-E  - apt to Dermatology obtained for pt     Community acquired pneumonia of left lower lobe of lung  Productive cough  - completed Amoxicillin and Doxycycline x 4 days  - persisting, change Mucinex DM to liquid formula, CPT BID x3 days    Chronic a-fib  - [id number] Score: 4. HASBLED Score:  Anticoagulation not on anticoagulation due to watchman device, on asa only   -  BB on allergy list   - Can use CCB (PO dilt) if rate control needed in light of BB "allergy"     Microcytic anemia  - continue ferrous sulfate daily  - stable, continue to monitor twice weekly CBC, Transfuse if Hgb < 7 or symptomatic     Intertrigo  - severe, continue fluconazole 200 mg daily x9 days  - referral placed to Dermatology for patient to have further evaluation      Presence of Watchman left atrial appendage closure device  - No need for anticoagulation      Venous insufficiency of both lower extremities  - continue ASA, pravastatin 40 mg daily     Prediabetes  - last A1c 6.0 on 01/26/2023  - cardiac diet  - [place]-Storm AC/HS     History of TIA (transient ischemic attack)  - Continue ASA 81 mg " daily, pravastatin     Pure hypercholesterolemia  - continue pravastatin 40 mg daily    Debility   - Continue with PT/OT for gait training and strengthening and restoration of ADL's   - Encourage mobility, OOB in chair, and early ambulation as appropriate  - Fall precautions   - Monitor for bowel and bladder dysfunction  - Monitor for and prevent skin breakdown and pressure ulcers  - Continue DVT prophylaxis with frequent ambulation              Anticipate disposition:  Home with home health      Follow-up needed during SNF stay-    Follow-up needed after discharge from SNF: PCP, care clinic    Future Appointments   Date Time Provider Department Center   5/4/2023  3:00 PM KIMBERLY Lopez [facility] AUDIO [facility]   6/15/2023 11:15 AM Lennie Louis DPM [facility] POD [facility] [place]   6/29/2023  8:30 AM Rachael Case MD [facility] IM [place]   7/12/2023 11:30 AM Brent Chapman Jr., MD [facility] CARDIO [place]         I spent > 45 minutes reviewing patient records, examining, and counseling the patient/ patient's family with greater than 50% of the time spent in direct patient care and coordination.      Suzy Aparicio NP  Department of Hospital Medicine   [facility]     DOS: 5/4/2023       Patient note was created using MModal Dictation.  Any errors in syntax or even information may not have been identified and edited on initial review prior to signing this note.

## 2023-05-05 ENCOUNTER — TELEPHONE (OUTPATIENT)
Dept: DERMATOLOGY | Facility: CLINIC | Age: 88
End: 2023-05-05

## 2023-05-05 ENCOUNTER — OFFICE VISIT (OUTPATIENT)
Dept: DERMATOLOGY | Facility: CLINIC | Age: 88
End: 2023-05-05
Payer: MEDICARE

## 2023-05-05 DIAGNOSIS — I87.2 VENOUS STASIS DERMATITIS, UNSPECIFIED LATERALITY: ICD-10-CM

## 2023-05-05 DIAGNOSIS — S81.801D MULTIPLE OPEN WOUNDS OF RIGHT LOWER EXTREMITY, SUBSEQUENT ENCOUNTER: ICD-10-CM

## 2023-05-05 DIAGNOSIS — L30.0 NUMMULAR DERMATITIS: ICD-10-CM

## 2023-05-05 LAB
ANION GAP SERPL CALC-SCNC: 9 MMOL/L (ref 8–16)
BUN SERPL-MCNC: 43 MG/DL (ref 10–30)
CALCIUM SERPL-MCNC: 8.4 MG/DL (ref 8.7–10.5)
CHLORIDE SERPL-SCNC: 102 MMOL/L (ref 95–110)
CO2 SERPL-SCNC: 26 MMOL/L (ref 23–29)
CREAT SERPL-MCNC: 1.2 MG/DL (ref 0.5–1.4)
EST. GFR  (NO RACE VARIABLE): 42.7 ML/MIN/1.73 M^2
GLUCOSE SERPL-MCNC: 98 MG/DL (ref 70–110)
POCT GLUCOSE: 105 MG/DL (ref 70–110)
POCT GLUCOSE: 189 MG/DL (ref 70–110)
POTASSIUM SERPL-SCNC: 4.5 MMOL/L (ref 3.5–5.1)
SODIUM SERPL-SCNC: 137 MMOL/L (ref 136–145)

## 2023-05-05 PROCEDURE — 1101F PR PT FALLS ASSESS DOC 0-1 FALLS W/OUT INJ PAST YR: ICD-10-PCS | Mod: HCNC,CPTII,S$GLB, | Performed by: STUDENT IN AN ORGANIZED HEALTH CARE EDUCATION/TRAINING PROGRAM

## 2023-05-05 PROCEDURE — 99214 PR OFFICE/OUTPT VISIT, EST, LEVL IV, 30-39 MIN: ICD-10-PCS | Mod: HCNC,S$GLB,, | Performed by: STUDENT IN AN ORGANIZED HEALTH CARE EDUCATION/TRAINING PROGRAM

## 2023-05-05 PROCEDURE — 99999 PR PBB SHADOW E&M-EST. PATIENT-LVL III: CPT | Mod: PBBFAC,HCNC,, | Performed by: STUDENT IN AN ORGANIZED HEALTH CARE EDUCATION/TRAINING PROGRAM

## 2023-05-05 PROCEDURE — 1159F PR MEDICATION LIST DOCUMENTED IN MEDICAL RECORD: ICD-10-PCS | Mod: HCNC,CPTII,S$GLB, | Performed by: STUDENT IN AN ORGANIZED HEALTH CARE EDUCATION/TRAINING PROGRAM

## 2023-05-05 PROCEDURE — 80048 BASIC METABOLIC PNL TOTAL CA: CPT | Mod: HCNC | Performed by: NURSE PRACTITIONER

## 2023-05-05 PROCEDURE — 97116 GAIT TRAINING THERAPY: CPT | Mod: HCNC

## 2023-05-05 PROCEDURE — 36415 COLL VENOUS BLD VENIPUNCTURE: CPT | Mod: HCNC | Performed by: NURSE PRACTITIONER

## 2023-05-05 PROCEDURE — 1111F DSCHRG MED/CURRENT MED MERGE: CPT | Mod: HCNC,CPTII,S$GLB, | Performed by: STUDENT IN AN ORGANIZED HEALTH CARE EDUCATION/TRAINING PROGRAM

## 2023-05-05 PROCEDURE — 99214 OFFICE O/P EST MOD 30 MIN: CPT | Mod: HCNC,S$GLB,, | Performed by: STUDENT IN AN ORGANIZED HEALTH CARE EDUCATION/TRAINING PROGRAM

## 2023-05-05 PROCEDURE — 97530 THERAPEUTIC ACTIVITIES: CPT | Mod: HCNC

## 2023-05-05 PROCEDURE — 1159F MED LIST DOCD IN RCRD: CPT | Mod: HCNC,CPTII,S$GLB, | Performed by: STUDENT IN AN ORGANIZED HEALTH CARE EDUCATION/TRAINING PROGRAM

## 2023-05-05 PROCEDURE — 3288F FALL RISK ASSESSMENT DOCD: CPT | Mod: HCNC,CPTII,S$GLB, | Performed by: STUDENT IN AN ORGANIZED HEALTH CARE EDUCATION/TRAINING PROGRAM

## 2023-05-05 PROCEDURE — 25000003 PHARM REV CODE 250: Mod: HCNC | Performed by: HOSPITALIST

## 2023-05-05 PROCEDURE — 3288F PR FALLS RISK ASSESSMENT DOCUMENTED: ICD-10-PCS | Mod: HCNC,CPTII,S$GLB, | Performed by: STUDENT IN AN ORGANIZED HEALTH CARE EDUCATION/TRAINING PROGRAM

## 2023-05-05 PROCEDURE — 1160F RVW MEDS BY RX/DR IN RCRD: CPT | Mod: HCNC,CPTII,S$GLB, | Performed by: STUDENT IN AN ORGANIZED HEALTH CARE EDUCATION/TRAINING PROGRAM

## 2023-05-05 PROCEDURE — 25000003 PHARM REV CODE 250: Mod: HCNC | Performed by: NURSE PRACTITIONER

## 2023-05-05 PROCEDURE — 1111F PR DISCHARGE MEDS RECONCILED W/ CURRENT OUTPATIENT MED LIST: ICD-10-PCS | Mod: HCNC,CPTII,S$GLB, | Performed by: STUDENT IN AN ORGANIZED HEALTH CARE EDUCATION/TRAINING PROGRAM

## 2023-05-05 PROCEDURE — 97110 THERAPEUTIC EXERCISES: CPT | Mod: HCNC

## 2023-05-05 PROCEDURE — 11000004 HC SNF PRIVATE: Mod: HCNC

## 2023-05-05 PROCEDURE — 1101F PT FALLS ASSESS-DOCD LE1/YR: CPT | Mod: HCNC,CPTII,S$GLB, | Performed by: STUDENT IN AN ORGANIZED HEALTH CARE EDUCATION/TRAINING PROGRAM

## 2023-05-05 PROCEDURE — 1126F AMNT PAIN NOTED NONE PRSNT: CPT | Mod: HCNC,CPTII,S$GLB, | Performed by: STUDENT IN AN ORGANIZED HEALTH CARE EDUCATION/TRAINING PROGRAM

## 2023-05-05 PROCEDURE — 1160F PR REVIEW ALL MEDS BY PRESCRIBER/CLIN PHARMACIST DOCUMENTED: ICD-10-PCS | Mod: HCNC,CPTII,S$GLB, | Performed by: STUDENT IN AN ORGANIZED HEALTH CARE EDUCATION/TRAINING PROGRAM

## 2023-05-05 PROCEDURE — 1126F PR PAIN SEVERITY QUANTIFIED, NO PAIN PRESENT: ICD-10-PCS | Mod: HCNC,CPTII,S$GLB, | Performed by: STUDENT IN AN ORGANIZED HEALTH CARE EDUCATION/TRAINING PROGRAM

## 2023-05-05 PROCEDURE — 99999 PR PBB SHADOW E&M-EST. PATIENT-LVL III: ICD-10-PCS | Mod: PBBFAC,HCNC,, | Performed by: STUDENT IN AN ORGANIZED HEALTH CARE EDUCATION/TRAINING PROGRAM

## 2023-05-05 RX ORDER — TRIAMCINOLONE ACETONIDE 1 MG/G
CREAM TOPICAL 2 TIMES DAILY
Status: DISCONTINUED | OUTPATIENT
Start: 2023-05-05 | End: 2023-05-12 | Stop reason: HOSPADM

## 2023-05-05 RX ORDER — TRIAMCINOLONE ACETONIDE 1 MG/G
OINTMENT TOPICAL 2 TIMES DAILY
Qty: 454 G | Refills: 1 | Status: SHIPPED | OUTPATIENT
Start: 2023-05-05 | End: 2023-05-29 | Stop reason: SDUPTHER

## 2023-05-05 RX ADMIN — FLUTICASONE PROPIONATE 100 MCG: 50 SPRAY, METERED NASAL at 09:05

## 2023-05-05 RX ADMIN — THERA TABS 1 TABLET: TAB at 09:05

## 2023-05-05 RX ADMIN — Medication 400 UNITS: at 09:05

## 2023-05-05 RX ADMIN — GUAIFENESIN AND DEXTROMETHORPHAN 10 ML: 100; 10 SYRUP ORAL at 09:05

## 2023-05-05 RX ADMIN — DICLOFENAC SODIUM 2 G: 10 GEL TOPICAL at 09:05

## 2023-05-05 RX ADMIN — MICONAZOLE NITRATE: 20 OINTMENT TOPICAL at 09:05

## 2023-05-05 RX ADMIN — HYDROCORTISONE: 1 CREAM TOPICAL at 09:05

## 2023-05-05 RX ADMIN — GUAIFENESIN AND DEXTROMETHORPHAN 10 ML: 100; 10 SYRUP ORAL at 02:05

## 2023-05-05 RX ADMIN — ZINC SULFATE 220 MG (50 MG) CAPSULE 220 MG: CAPSULE at 09:05

## 2023-05-05 RX ADMIN — ASPIRIN 81 MG: 81 TABLET, COATED ORAL at 09:05

## 2023-05-05 RX ADMIN — FERROUS SULFATE TAB 325 MG (65 MG ELEMENTAL FE) 1 EACH: 325 (65 FE) TAB at 09:05

## 2023-05-05 RX ADMIN — ACETAMINOPHEN 1000 MG: 500 TABLET ORAL at 09:05

## 2023-05-05 RX ADMIN — FUROSEMIDE 20 MG: 20 TABLET ORAL at 09:05

## 2023-05-05 RX ADMIN — DICLOFENAC SODIUM 2 G: 10 GEL TOPICAL at 01:05

## 2023-05-05 RX ADMIN — FLUCONAZOLE 200 MG: 200 TABLET ORAL at 09:05

## 2023-05-05 RX ADMIN — SENNOSIDES AND DOCUSATE SODIUM 1 TABLET: 50; 8.6 TABLET ORAL at 09:05

## 2023-05-05 RX ADMIN — PRAVASTATIN SODIUM 40 MG: 20 TABLET ORAL at 09:05

## 2023-05-05 RX ADMIN — Medication 250 MG: at 09:05

## 2023-05-05 NOTE — PT/OT/SLP PROGRESS
"Physical Therapy Treatment    Patient Name:  Jenniffer Jimenez   MRN:  [id number]  Admit Date: 4/27/2023  Admitting Diagnosis: Acute on chronic diastolic heart failure  Recent Surgeries: N/A    General Precautions: Standard, fall, hearing impaired  Orthopedic Precautions: N/A  Braces: N/A    Recommendations:     Discharge Recommendations: home health PT  Level of Assistance Recommended at Discharge: 24 hours light assistance  Discharge Equipment Recommendations: none  Barriers to discharge: None    Assessment:     Jenniffer Jimenez is a 89 y.o. female admitted with a medical diagnosis of Acute on chronic diastolic heart failure. Patient agreeable to PT treatment this AM. PT discussed care plan of 5x/week upon patient request; patient expressed understanding. Patient demonstrates improved tolerance for ambulation as evidenced by increased gait distance traveled. Patient continues to require Deirdre for curb step training for safety and management of RW. Patient will benefit from continued SNF rehabilitation services to address deficits as well as progress mobility towards increased functional independence for safe transition to home environment at time of discharge.       Performance deficits affecting function: weakness, impaired endurance, impaired self care skills, impaired functional mobility, gait instability, impaired balance, decreased lower extremity function, decreased safety awareness.    Rehab Potential is good    Activity Tolerance: Good    Plan:     Patient to be seen 5 x/week to address the above listed problems via gait training, therapeutic activities, therapeutic exercises, neuromuscular re-education, wheelchair management/training    Plan of Care Expires: 05/28/23  Plan of Care Reviewed with: patient    Subjective     "How did I do?"     Pain/Comfort:  Pain Rating 1: 0/10  Pain Rating Post-Intervention 1: 0/10    Patient's cultural, spiritual, Jehovah's witness conflicts given the current " "situation:  yes    Objective:     Communicated with nursing staff prior to session.  Patient found  seated in bedside chair  with  (no active lines) upon PT entry to room.     Therapeutic Activities and Exercises:   LE ergometer x 10 minutes at minimal resistance for LE strengthening, ROM, and endurance; no rest break required.     Functional Mobility:  Transfers:     Sit to Stand:  stand by assistance with rolling walker  Bedside Chair to Wheelchair: contact guard assistance with  rolling walker  using  Step Transfer  Gait: Patient ambulated 182 feet and an additional 72 feet to access LE ergometer using RW with CGA. No LOB. Slow pace of gait at end of initial gait trial and throughout entire second gait trial; prolonged time in stance phase for BLE. PT with W/C in tow.    Stairs:  Pt ascended/descended 4" curb step with Rolling Walker with no handrails with Minimal Assistance. Repeated x 2 consecutive trials. Assist to manage RW.  Wheelchair Propulsion:  Pt propelled Standard wheelchair x 70 feet on Level tile with  Bilateral upper extremity with Minimal Assistance.     AM-PAC 6 CLICK MOBILITY  16    Patient left up in chair with call button in reach and nursing staff notified.    GOALS:   Multidisciplinary Problems       Physical Therapy Goals          Problem: Physical Therapy    Goal Priority Disciplines Outcome Goal Variances Interventions   Physical Therapy Goal     PT, PT/OT Ongoing, Progressing     Description: Goals to be met by: 5/28/23     Patient will increase functional independence with mobility by performing:    . Supine to sit with Set-up [place]  . Sit to supine with Set-up [place]  . Rolling to Left and Right with Modified [place]  . Sit to stand transfer with Supervision - not met, progressing  . Bed to chair transfer with Supervision using Rolling Walker - not met, progressing  . Gait  x 150 feet with Stand-by Assistance using Rolling Walker - not met, progressing  . Wheelchair " propulsion x 150 feet with Stand-by Assistance using bilateral upper extremities - not met, progressing  . Ascend/Descend 4 inch curb step with Contact Guard Assistance using Rolling Walker - not met, progressing                         Time Tracking:     PT Received On: 05/05/23  PT Start Time: 0840  PT Stop Time: 0920  PT Total Time (min): 40 min    Billable Minutes: Gait Training 15, Therapeutic Activity 15, and Therapeutic Exercise 10    Treatment Type: Treatment  PT/PTA: PT     Number of PTA visits since last PT visit: 0     05/05/2023

## 2023-05-05 NOTE — PROGRESS NOTES
Subjective:      Patient ID:  Jenniffer Jimenez is a 89 y.o. female who presents for   Chief Complaint   Patient presents with    Cellulitis    Eczema     History of Present Illness: The patient presents to establish care.     Patient with new complaint of lesion(s)  Location: bilateral legs  Duration: sinec July   Symptoms: cellulitis, eczema on arms   Relieving factors/Previous treatments: none          Eczema    Patient has had longstanding venous stasis dermatitis of her legs seen by Dr. Coello in 2019. Her daughter reports that she has had issues with leg redness and swelling for about a year. Previously followed by Dr. Cox. Currently in LTAC and following with wound care there. They are occasionally itchy and painful    Review of Systems   Hematologic/Lymphatic: Does not bruise/bleed easily.     Objective:   Physical Exam   Constitutional: She appears well-developed and well-nourished. No distress.   Neurological: She is alert and oriented to person, place, and time. She is not disoriented.   Psychiatric: She has a normal mood and affect.   Skin:   Areas Examined (abnormalities noted in diagram):   RLE Inspected  LLE Inspection Performed          Diagram Legend     Erythematous scaling macule/papule c/w actinic keratosis       Vascular papule c/w angioma      Pigmented verrucoid papule/plaque c/w seborrheic keratosis      Yellow umbilicated papule c/w sebaceous hyperplasia      Irregularly shaped tan macule c/w lentigo     1-2 mm smooth white papules consistent with Milia      Movable subcutaneous cyst with punctum c/w epidermal inclusion cyst      Subcutaneous movable cyst c/w pilar cyst      Firm pink to brown papule c/w dermatofibroma      Pedunculated fleshy papule(s) c/w skin tag(s)      Evenly pigmented macule c/w junctional nevus     Mildly variegated pigmented, slightly irregular-bordered macule c/w mildly atypical nevus      Flesh colored to evenly pigmented papule c/w intradermal nevus        Pink pearly papule/plaque c/w basal cell carcinoma      Erythematous hyperkeratotic cursted plaque c/w SCC      Surgical scar with no sign of skin cancer recurrence      Open and closed comedones      Inflammatory papules and pustules      Verrucoid papule consistent consistent with wart     Erythematous eczematous patches and plaques     Dystrophic onycholytic nail with subungual debris c/w onychomycosis     Umbilicated papule    Erythematous-base heme-crusted tan verrucoid plaque consistent with inflamed seborrheic keratosis     Erythematous Silvery Scaling Plaque c/w Psoriasis     See annotation            Assessment / Plan:        Venous stasis dermatitis, unspecified laterality  -     Ambulatory referral/consult to Dermatology  -     triamcinolone acetonide 0.1% (KENALOG) 0.1 % ointment; Apply topically 2 (two) times daily. Use to affected areas for up to 2 weeks then take a 1 week break or decrease to 3 times weekly. Do not apply to groin or face. Apply to red scaly areas on the legs and arms  Dispense: 454 g; Refill: 1    - Legs with significant pitting edema with erythema and scaling and some erosions and serous drainage. Not concerning for infection  - Would recommend to continue to follow with wound care physician or nurse. Can use wraps or compression and elevation of the leg to help improve edema, which is the underlying cause of her dermatitis and weeping. May benefit from unna boot of right leg but will defer to wound care. Can continue to cover with antibiotic ointment (such as mupirocin or silvadene) to broken skin and triamcinolone to red scaly areas    Nummular dermatitis--arms and back  - Possibly atopic dermatitis vs id reaction to venous stasis dermatitis  - Triamcinolone 0.1% ointment BID to affected areas for up to 2 weeks then take 1 week break or wean to TIW PRN. Avoid use on face and groin. Counseled on RBSE including atrophy and hypopigmentation          Follow up if symptoms worsen or  fail to improve.

## 2023-05-05 NOTE — PLAN OF CARE
Continue with POC.     Problem: Physical Therapy  Goal: Physical Therapy Goal  Description: Goals to be met by: 5/28/23     Patient will increase functional independence with mobility by performing:    . Supine to sit with Set-up [place]  . Sit to supine with Set-up [place]  . Rolling to Left and Right with Modified [place]  . Sit to stand transfer with Supervision - not met, progressing  . Bed to chair transfer with Supervision using Rolling Walker - not met, progressing  . Gait  x 150 feet with Stand-by Assistance using Rolling Walker - not met, progressing  . Wheelchair propulsion x 150 feet with Stand-by Assistance using bilateral upper extremities - not met, progressing  . Ascend/Descend 4 inch curb step with Contact Guard Assistance using Rolling Walker - not met, progressing    Outcome: Ongoing, Progressing   5/5/2023

## 2023-05-05 NOTE — PLAN OF CARE
Continue cardiac diet , Bob BID, 1.5 L fluid restriction, RD following  Goals: PO to meet 85% of assessed needs by next RD visit  Nutrition Goal Status: goal met  Communication of RD Recs: other (comment) (POC)     Assessment and Plan  Increased nutrient needs related to wound healing as evidenced by chronic leg wounds, edema in lower extremities.     New     Plan   Collaboration with other providers  Fat and mineral modified diet- cardiac  Fluid restricted diet-1.5 L  Nutrition education- Pre-diabetes, fluid restriction, wound care supplement, protein sources  Commercial beverage(amino acids) Bob BID

## 2023-05-05 NOTE — PROGRESS NOTES
[facility] - Skilled Nursing  Adult Nutrition  Progress Note    SUMMARY   Recommendations  Continue cardiac diet , Bob BID, 1.5 L fluid restriction, RD following  Goals: PO to meet 85% of assessed needs by next RD visit  Nutrition Goal Status: goal met  Communication of RD Recs: other (comment) (POC)    Assessment and Plan  Increased nutrient needs related to wound healing as evidenced by chronic leg wounds, edema in lower extremities.    New    Plan   Collaboration with other providers  Fat and mineral modified diet- cardiac  Fluid restricted diet-1.5 L  Nutrition education- Pre-diabetes, fluid restriction, wound care supplement, protein sources  Commercial beverage(amino acids) Bob BID    Malnutrition Assessment 4/28     Skin (Micronutrient): dry, scaly, edema  Hair/Scalp (Micronutrient): dry           Orbital Region (Subcutaneous Fat Loss): mild depletion  Upper Arm Region (Subcutaneous Fat Loss): mild depletion   [place] Region (Muscle Loss): mild depletion  Clavicle and Acromion Bone Region (Muscle Loss): mild depletion  Dorsal Hand (Muscle Loss): mild depletion                 Reason for Assessment    Reason For Assessment: RD follow-up  Diagnosis:  (HF, pneumonia)  Relevant Medical History: cellulitis bilateral legs, TIA, OA, DM, CKD3, HTN, HLD, HF, AFIB, neuropathy,  Interdisciplinary Rounds: attended  General Information Comments: Patient with good appetite,likes the food, she was told in past not to take Bob, but NP states that issue no longer is an issue for patient and she will be informed by RD to continue BID. pt is pre-diabetic and she acknowledges this.  %  Nutrition Discharge Planning: Dc on cardiac diet diabetic diet    Nutrition/Diet History    Patient Reported Diet/Restrictions/Preferences: low salt  Typical Food/Fluid Intake: 2-3 meals provided  Food Preferences: likes salads  Spiritual, Cultural Beliefs, Latter-day Practices, Values that Affect Care: no  Food Allergies: NKFA  Factors  "Affecting Nutritional Intake: None identified at this time    Anthropometrics    Temp: 98.2 °F (36.8 °C)  Height Method: Stated  Height: 5' 5" (165.1 cm)  Height (inches): 65 in  Weight Method: Bed Scale  Weight: 73.3 kg (161 lb 9.6 oz)  Weight (lb): 161.6 lb  Ideal Body Weight (IBW), Female: 125 lb  % Ideal Body Weight, Female (lb): 129.28 %  BMI (Calculated): 26.9  BMI Grade: 25 - 29.9 - overweight  Weight Loss: intentional  Usual Body Weight (UBW), k.7 kg  Weight Change Amount:  (8% weight loss this past year, intentional, recent weight gain 5% was from fluid extremities)  % Usual Body Weight: 90.54  % Weight Change From Usual Weight: -9.65 %       Lab/Procedures/Meds    Pertinent Labs Reviewed: reviewed  Pertinent Labs Comments: .6, Hg 7.9, Hct 24.6, HgA1c 5.9  Pertinent Medications Reviewed: reviewed  Pertinent Medications Comments: Vit C, MVI, statin,       Estimated/Assessed Needs    Weight Used For Calorie Calculations: 70.2 kg (154 lb 12.2 oz)  Energy Calorie Requirements (kcal): 1453  Energy Need Method: [place]-St Jeor (x 1.3(PAL))  Protein Requirements: 70  Weight Used For Protein Calculations: 70.2 kg (154 lb 12.2 oz)  Fluid Requirements (mL): 1453 or per MD  Estimated Fluid Requirement Method: RDA Method  RDA Method (mL): 1453  CHO Requirement: 181      Nutrition Prescription Ordered    Current Diet Order: Cardiac  Nutrition Order Comments: Patient informed on fluid restriciton  Oral Nutrition Supplement: Bob BID    Evaluation of Received Nutrient/Fluid Intake    I/O: no data  Energy Calories Required: meeting needs  Protein Required: meeting needs  Fluid Required: meeting needs  Comments: LBM 5/4  Tolerance: tolerating  % Intake of Estimated Energy Needs: 75 - 100 %  % Meal Intake: 75 - 100 %    Nutrition Risk    Level of Risk/Frequency of Follow-up: low (one time per week)     Monitor and Evaluation    Food and Nutrient Intake: food and beverage intake  Food and Nutrient Adminstration: " diet order  Anthropometric Measurements: weight change  Biochemical Data, Medical Tests and Procedures: gastrointestinal profile, electrolyte and renal panel, glucose/endocrine profile  Nutrition-Focused Physical Findings: skin     Nutrition Follow-Up    RD Follow-up?: Yes

## 2023-05-05 NOTE — PROGRESS NOTES
[facility]                                  Skilled Nursing Facility                   Progress Note     Admit Date: 4/27/2023  GILES 5/18/2023  Principal Problem:  Acute on chronic diastolic heart failure   HPI obtained from patient interview and chart review     Chief Complaint: Re-evaluation of medical treatment and therapy status, lab review    HPI:   Jenniffer Jimenez is a 89 y.o. female with PMHx of cataract, [place], CKD, DM II, diabetic polyneuropathy, HTN, PAF, TIA presents to SNF following hospitalization for AFib RVR and presumed cellulitis to right lower extremity.     Interval history:  All of today's labs reviewed and are listed below.  K normalized today to 4.5 after 2 doses of Lokelma yesterday for hyperkalemia.  BUN/creatinine also 43/1.2.  Continues in is 166.  24 hr vital sign ranges listed below.  Patient able to go to Dermatology Clinic for evaluation suggested by wound care.  Orders updated in computer.  Patient denies shortness of breath, abdominal discomfort, nausea, or vomiting.  Patient reports an adequate appetite.  Patient denies dysuria.  Patient reports having regular bowel movements.  Patient progessing with PT/OT. Continuing to follow and treat all acute and chronic conditions.      Past Medical History: Patient has a past medical history of Amblyopia of left eye (04/10/2013), Arthritis, Atherosclerosis of aorta, Cataract, Central retinal vein occlusion of left eye, CKD (chronic kidney disease) stage 3, GFR 30-59 ml/min, Diabetes mellitus, type 2, Diabetic polyneuropathy (01/06/2022), Essential (primary) hypertension, Exotropia of both eyes (02/06/2013), Hearing loss, History of resection of small bowel, Hypertensive retinopathy of both eyes, Hypoglycemia, Macular degeneration, OA (osteoarthritis) of shoulder, Osteoporosis, Paroxysmal atrial fibrillation, Posterior vitreous detachment of both eyes, Psychiatric  problem, Rhinitis, and TIA (transient ischemic attack).    Past Surgical History: Patient has a past surgical history that includes Cardiac catheterization; Inner ear surgery; Sinus surgery; Tonsillectomy;  section, classic; Appendectomy; Strabismus surgery (13); Strabismus surgery (2014); Cataract extraction w/  intraocular lens implant (Bilateral); Hysterectomy; Oophorectomy; Joint replacement; Closure of left atrial appendage using device (N/A, 2020); and watchman surgery (N/A, 2020).    Social History: Patient reports that she quit smoking about 40 years ago. Her smoking use included cigarettes. She has never been exposed to tobacco smoke. She has never used smokeless tobacco. She reports that she does not currently use alcohol after a past usage of about 2.0 standard drinks per week. She reports that she does not use drugs.    Family History: family history includes Breast cancer in her maternal aunt; Diabetes in her brother and sister; Heart disease in her sister and sister; Hypertension in her father and mother; Liver disease in her sister; No Known Problems in her daughter, maternal grandfather, maternal grandmother, maternal uncle, paternal aunt, paternal grandfather, paternal grandmother, paternal uncle, son, son, and son.    Allergies: Patient is allergic to opioids - morphine analogues, tizanidine, tramadol, beta-blockers (beta-adrenergic blocking agts), morphine, opioids-meperidine and related, and ciprofloxacin.    ROS  Constitutional: Negative for fever   Eyes: Negative for blurred vision, double vision   Respiratory: Negative for shortness of breath.  +productive cough   Cardiovascular: Negative for chest pain, palpitations.  + leg swelling.   Gastrointestinal: Negative for abdominal pain, constipation, diarrhea, nausea, vomiting.   Genitourinary: Negative for dysuria, frequency   Musculoskeletal:  + generalized weakness.  + for back pain and myalgias.   Skin:  + for itching  and rash.   Neurological: Negative for dizziness, headaches.   Psychiatric/Behavioral: Negative for depression. The patient is not nervous/anxious.      24 hour Vital Sign Range   Temp:  [98.1 °F (36.7 °C)]   Pulse:  [78]   Resp:  [18]   BP: (128-132)/(54-58)   SpO2:  [90 %]     Current BMI: Body mass index is 26.89 kg/m².    PEx  Constitutional: Patient appears debilitated.  No distress noted  HENT:   Head: Normocephalic and atraumatic.   Eyes: Pupils are equal, round  Neck: Normal range of motion. Neck supple.   Cardiovascular: Normal rate, irregular rhythm and normal heart sounds.    Pulmonary/Chest: Effort normal and breath sounds are clear  Abdominal: Soft. Bowel sounds are normal.   Musculoskeletal: Normal range of motion.   Neurological: Alert and oriented to person, place, and time.   Psychiatric: Normal mood and affect. Behavior is normal.   Skin: Skin is warm and dry.       05/02/23 0900         Altered Skin Integrity 04/24/23 1136 Buttocks Moisture associated dermatitis Intact skin with non-blanchable redness of localized area   Date First Assessed/Time First Assessed: 04/24/23 1136   Altered Skin Integrity Present on Admission - Did Patient arrive to the hospital with altered skin?: yes  Location: (c) Buttocks  Is this injury device related?: No  Primary Wound Type: Moisture...   Dressing Appearance Open to air   Drainage Amount Scant   Drainage Characteristics/Odor Clear   Appearance Pink;Red;Moist   Tissue loss description Not applicable   Periwound Area Dry   Wound Edges Rolled/closed   Care Cleansed with:;Soap and water;Applied:;Skin Barrier;Other (see comments)  (miconazole ointment)   Dressing Change Due 05/02/23        Altered Skin Integrity Left lower;lateral Leg   No Date First Assessed or Time First Assessed found.   Altered Skin Integrity Present on Admission - Did Patient arrive to the hospital with altered skin?: yes  Side: Left  Orientation: lower;lateral  Location: Leg   Dressing  Appearance Dry;Intact;Clean   Drainage Amount Scant   Drainage Characteristics/Odor Clear   Appearance Red;Dry   Tissue loss description Not applicable   Periwound Area Intact;Dry;Excoriated;Redness  (miconazole ointment)   Care Cleansed with:;Antimicrobial agent;Applied:;Skin Barrier        Altered Skin Integrity Right anterior;lower;lateral;medial;posterior Leg   No Date First Assessed or Time First Assessed found.   Altered Skin Integrity Present on Admission - Did Patient arrive to the hospital with altered skin?: yes  Side: Right  Orientation: anterior;lower;lateral;medial;posterior  Location: Leg   Dressing Appearance Dry;Intact;Clean   Drainage Amount Scant   Drainage Characteristics/Odor Clear   Appearance Red;Dry   Tissue loss description Not applicable   Periwound Area Intact;Excoriated;Redness   Care Cleansed with:;Antimicrobial agent;Applied:;Skin Barrier  (micnoazole ointment)   Dressing Change Due 05/02/23        Rash 04/24/23 1047 Left anterior Groin   Date of First Assessment/Time of First Assessment: 04/24/23 1047   Present Prior to Hospital Arrival?: Yes  Side: Left  Orientation: anterior  Location: Groin   Distribution regional   Configuration/Shape asymmetric   Characteristics redness/erythema;pruritus/itching;burning   Color deep red;red        Rash 04/24/23 1047 Right anterior Groin   Date of First Assessment/Time of First Assessment: 04/24/23 1047   Present Prior to Hospital Arrival?: Yes  Side: Right  Orientation: anterior  Location: Groin   Distribution regional   Configuration/Shape asymmetric   Characteristics redness/erythema;pruritus/itching;burning         Recent Labs   Lab 05/05/23  0604      K 4.5      CO2 26   BUN 43*   CREATININE 1.2         No results for input(s): WBC, RBC, HGB, HCT, PLT, MCV, MCH, MCHC in the last 24 hours.        Recent Labs   Lab 05/03/23  2124 05/04/23  0716 05/04/23  1208 05/04/23  1602 05/04/23  2039 05/05/23  0718   POCTGLUCOSE 150* 105 119* 86  "183* 105        Assessment and Plan:    Acute on chronic diastolic heart failure  - last echo from 10/07/2022 with EF of 55%  - monitor Is&Os and daily weights.    - Fluid restriction of 1.5 L, cardiac diet  - BMP at terrible at 166, continue Lasix 20mg daily     Stage 3b chronic kidney disease  - sCr baseline around 1.2  - improving, continue reduced Lasix 20 mg daily, avoid nephrotoxic agents, renally dose medications when appropriate.      Hyperkalemia  - resolved s/p Lokelma 10 mg q.4 hours x2 doses    Cellulitis BLE  Chronic lower extremity wounds  - completed Amoxicillin and Doxycycline x 4 days end date 4/30  - Bob supplements, zinc, multivitamin, ascorbic acid, continue vitamin-E  -  Dermatology appointment today, updated orders to reflect recommendations     Community acquired pneumonia of left lower lobe of lung  Productive cough  - completed Amoxicillin and Doxycycline x 4 days  - persisting, change Mucinex DM to liquid formula, CPT BID x3 days    Chronic a-fib  - [id number] Score: 4. HASBLED Score:  Anticoagulation not on anticoagulation due to watchman device, on asa only   -  BB on allergy list   - Can use CCB (PO dilt) if rate control needed in light of BB "allergy"     Microcytic anemia  - continue ferrous sulfate daily  - stable, continue to monitor twice weekly CBC, Transfuse if Hgb < 7 or symptomatic     Intertrigo  - severe, continue fluconazole 200 mg daily x9 days  - referral placed to Dermatology for patient to have further evaluation      Presence of Watchman left atrial appendage closure device  - No need for anticoagulation      Venous insufficiency of both lower extremities  - continue ASA, pravastatin 40 mg daily     Prediabetes  - last A1c 6.0 on 01/26/2023  - cardiac diet  - Accu-Cheks AC/HS     History of TIA (transient ischemic attack)  - Continue ASA 81 mg daily, pravastatin     Pure hypercholesterolemia  - continue pravastatin 40 mg daily    Debility   - Continue with PT/OT for " gait training and strengthening and restoration of ADL's   - Encourage mobility, OOB in chair, and early ambulation as appropriate  - Fall precautions   - Monitor for bowel and bladder dysfunction  - Monitor for and prevent skin breakdown and pressure ulcers  - Continue DVT prophylaxis with frequent ambulation              Anticipate disposition:  Home with home health      Follow-up needed during SNF stay-    Follow-up needed after discharge from SNF: PCP, care clinic    Future Appointments   Date Time Provider Department Center   6/15/2023 11:15 AM Lennie Louis DPM [facility] POD [address]   6/29/2023  8:30 AM Rachael Case MD [facility] IM [place]   7/12/2023 11:30 AM Brent Chapman Jr., MD [facility] CARDIO [place]         I spent > 45 minutes reviewing patient records, examining, and counseling the patient/ patient's family with greater than 50% of the time spent in direct patient care and coordination.      Suzy Aparicio NP  Department of Hospital Medicine   [facility]- Skilled Nursing Facility     DOS: 5/5/2023       Patient note was created using MModal Dictation.  Any errors in syntax or even information may not have been identified and edited on initial review prior to signing this note.

## 2023-05-06 LAB
POCT GLUCOSE: 109 MG/DL (ref 70–110)
POCT GLUCOSE: 110 MG/DL (ref 70–110)
POCT GLUCOSE: 117 MG/DL (ref 70–110)
POCT GLUCOSE: 145 MG/DL (ref 70–110)
POCT GLUCOSE: 147 MG/DL (ref 70–110)

## 2023-05-06 PROCEDURE — 25000003 PHARM REV CODE 250: Mod: HCNC | Performed by: HOSPITALIST

## 2023-05-06 PROCEDURE — 11000004 HC SNF PRIVATE: Mod: HCNC

## 2023-05-06 PROCEDURE — 97530 THERAPEUTIC ACTIVITIES: CPT | Mod: HCNC

## 2023-05-06 PROCEDURE — 97116 GAIT TRAINING THERAPY: CPT | Mod: HCNC

## 2023-05-06 PROCEDURE — 25000003 PHARM REV CODE 250: Mod: HCNC | Performed by: NURSE PRACTITIONER

## 2023-05-06 PROCEDURE — 94640 AIRWAY INHALATION TREATMENT: CPT | Mod: HCNC

## 2023-05-06 PROCEDURE — 25000242 PHARM REV CODE 250 ALT 637 W/ HCPCS: Mod: HCNC | Performed by: HOSPITALIST

## 2023-05-06 PROCEDURE — 94761 N-INVAS EAR/PLS OXIMETRY MLT: CPT | Mod: HCNC

## 2023-05-06 PROCEDURE — 97110 THERAPEUTIC EXERCISES: CPT | Mod: HCNC

## 2023-05-06 RX ADMIN — FERROUS SULFATE TAB 325 MG (65 MG ELEMENTAL FE) 1 EACH: 325 (65 FE) TAB at 09:05

## 2023-05-06 RX ADMIN — MICONAZOLE NITRATE: 20 OINTMENT TOPICAL at 09:05

## 2023-05-06 RX ADMIN — ZINC SULFATE 220 MG (50 MG) CAPSULE 220 MG: CAPSULE at 09:05

## 2023-05-06 RX ADMIN — DICLOFENAC SODIUM 2 G: 10 GEL TOPICAL at 05:05

## 2023-05-06 RX ADMIN — GUAIFENESIN AND DEXTROMETHORPHAN 10 ML: 100; 10 SYRUP ORAL at 09:05

## 2023-05-06 RX ADMIN — DICLOFENAC SODIUM 2 G: 10 GEL TOPICAL at 01:05

## 2023-05-06 RX ADMIN — HYDROCORTISONE: 1 CREAM TOPICAL at 09:05

## 2023-05-06 RX ADMIN — ASPIRIN 81 MG: 81 TABLET, COATED ORAL at 09:05

## 2023-05-06 RX ADMIN — DICLOFENAC SODIUM 2 G: 10 GEL TOPICAL at 09:05

## 2023-05-06 RX ADMIN — FLUTICASONE PROPIONATE 100 MCG: 50 SPRAY, METERED NASAL at 09:05

## 2023-05-06 RX ADMIN — ACETAMINOPHEN 1000 MG: 500 TABLET ORAL at 09:05

## 2023-05-06 RX ADMIN — FLUCONAZOLE 200 MG: 200 TABLET ORAL at 09:05

## 2023-05-06 RX ADMIN — THERA TABS 1 TABLET: TAB at 09:05

## 2023-05-06 RX ADMIN — Medication 400 UNITS: at 09:05

## 2023-05-06 RX ADMIN — IPRATROPIUM BROMIDE AND ALBUTEROL SULFATE 3 ML: 2.5; .5 SOLUTION RESPIRATORY (INHALATION) at 09:05

## 2023-05-06 RX ADMIN — FUROSEMIDE 20 MG: 20 TABLET ORAL at 09:05

## 2023-05-06 RX ADMIN — SILVER SULFADIAZINE: 10 CREAM TOPICAL at 09:05

## 2023-05-06 RX ADMIN — Medication 250 MG: at 09:05

## 2023-05-06 RX ADMIN — GUAIFENESIN AND DEXTROMETHORPHAN 10 ML: 100; 10 SYRUP ORAL at 03:05

## 2023-05-06 RX ADMIN — PRAVASTATIN SODIUM 40 MG: 20 TABLET ORAL at 09:05

## 2023-05-06 NOTE — PT/OT/SLP PROGRESS
"Physical Therapy Treatment    Patient Name:  Jenniffer Jimenez   MRN:  [id number]  Admit Date: 4/27/2023  Admitting Diagnosis: Acute on chronic diastolic heart failure  Recent Surgeries: N/A    General Precautions: Standard, fall, hearing impaired  Orthopedic Precautions: N/A  Braces: N/A    Recommendations:     Discharge Recommendations: home health PT  Level of Assistance Recommended at Discharge: 24 hours light assistance  Discharge Equipment Recommendations: none  Barriers to discharge: None    Assessment:     Jenniffer Jimenez is a 89 y.o. female admitted with a medical diagnosis of Acute on chronic diastolic heart failure. Patient agreeable to PT treatment this AM. Patient with overall maintenance of gait distance traveled this session. Patient continues to require assistance and cues for safety during curb step training using RW. Patient tolerated increased time for BLE exercise on mini elliptical without adverse reaction. Patient will benefit from continued SNF rehabilitation services to address deficits as well as progress mobility towards PLOF with focus on fall prevention and safety awareness during all mobility tasks.       Performance deficits affecting function: weakness, impaired endurance, impaired self care skills, impaired functional mobility, gait instability, impaired balance, decreased safety awareness, decreased lower extremity function.    Rehab Potential is good    Activity Tolerance: Good    Plan:     Patient to be seen 5 x/week to address the above listed problems via gait training, therapeutic activities, therapeutic exercises, neuromuscular re-education, wheelchair management/training    Plan of Care Expires: 05/28/23  Plan of Care Reviewed with: patient    Subjective     "Can I do the bike?"     Pain/Comfort:  Pain Rating 1: 0/10  Pain Rating Post-Intervention 1: 0/10    Patient's cultural, spiritual, Mandaen conflicts given the current situation:  yes    Objective:     Communicated " "with nursing staff prior to session.  Patient found  seated in bedside chair  with  (no active lines) upon PT entry to room.     Therapeutic Activities and Exercises:   LE ergometer x 15 minutes at minimal resistance for LE strengthening, ROM, and endurance; no rest break required.     Functional Mobility:  Transfers:     Sit to Stand:  stand by assistance with rolling walker  Bedside chair to wheelchair: contact guard assistance with  no AD  using  Stand Pivot  Gait: Patient ambulated 154 feet x 2 trials using RW with CGA. Patient with occasional attempt to exaggerate step length resulting in slow pace of gait and decreased stability/balance. PT with W/C in tow. Seated rest break between trials.   Stairs:  Pt ascended/descended 4" curb step with Rolling Walker with no handrails with Minimal Assistance and repeated x 2 consecutive trials.     AM-PAC 6 CLICK MOBILITY  16    Patient left up in chair with call button in reach and nursing staff notified.    GOALS:   Multidisciplinary Problems       Physical Therapy Goals          Problem: Physical Therapy    Goal Priority Disciplines Outcome Goal Variances Interventions   Physical Therapy Goal     PT, PT/OT Ongoing, Progressing     Description: Goals to be met by: 5/28/23     Patient will increase functional independence with mobility by performing:    . Supine to sit with Set-up [place]  . Sit to supine with Set-up [place]  . Rolling to Left and Right with Modified [place]  . Sit to stand transfer with Supervision - not met, progressing  . Bed to chair transfer with Supervision using Rolling Walker - not met, progressing  . Gait  x 150 feet with Stand-by Assistance using Rolling Walker - not met, progressing  . Wheelchair propulsion x 150 feet with Stand-by Assistance using bilateral upper extremities - not met, progressing  . Ascend/Descend 4 inch curb step with Contact Guard Assistance using Rolling Walker - not met, progressing                   "       Time Tracking:     PT Received On: 05/06/23  PT Start Time: 1041  PT Stop Time: 1121  PT Total Time (min): 40 min    Billable Minutes: Gait Training 15, Therapeutic Activity 10, and Therapeutic Exercise 15    Treatment Type: Treatment  PT/PTA: PT     Number of PTA visits since last PT visit: 0     05/06/2023

## 2023-05-07 LAB
POCT GLUCOSE: 112 MG/DL (ref 70–110)
POCT GLUCOSE: 137 MG/DL (ref 70–110)
POCT GLUCOSE: 192 MG/DL (ref 70–110)
POCT GLUCOSE: 95 MG/DL (ref 70–110)

## 2023-05-07 PROCEDURE — 11000004 HC SNF PRIVATE: Mod: HCNC

## 2023-05-07 PROCEDURE — 94640 AIRWAY INHALATION TREATMENT: CPT | Mod: HCNC

## 2023-05-07 PROCEDURE — 25000242 PHARM REV CODE 250 ALT 637 W/ HCPCS: Mod: HCNC | Performed by: HOSPITALIST

## 2023-05-07 PROCEDURE — 25000003 PHARM REV CODE 250: Mod: HCNC | Performed by: NURSE PRACTITIONER

## 2023-05-07 PROCEDURE — 94761 N-INVAS EAR/PLS OXIMETRY MLT: CPT | Mod: HCNC

## 2023-05-07 PROCEDURE — 25000003 PHARM REV CODE 250: Mod: HCNC | Performed by: HOSPITALIST

## 2023-05-07 RX ADMIN — ACETAMINOPHEN 1000 MG: 500 TABLET ORAL at 09:05

## 2023-05-07 RX ADMIN — FERROUS SULFATE TAB 325 MG (65 MG ELEMENTAL FE) 1 EACH: 325 (65 FE) TAB at 09:05

## 2023-05-07 RX ADMIN — DICLOFENAC SODIUM 2 G: 10 GEL TOPICAL at 09:05

## 2023-05-07 RX ADMIN — Medication 250 MG: at 09:05

## 2023-05-07 RX ADMIN — FLUTICASONE PROPIONATE 100 MCG: 50 SPRAY, METERED NASAL at 09:05

## 2023-05-07 RX ADMIN — MICONAZOLE NITRATE: 20 OINTMENT TOPICAL at 09:05

## 2023-05-07 RX ADMIN — HYDROCORTISONE: 1 CREAM TOPICAL at 09:05

## 2023-05-07 RX ADMIN — SENNOSIDES AND DOCUSATE SODIUM 1 TABLET: 50; 8.6 TABLET ORAL at 09:05

## 2023-05-07 RX ADMIN — Medication 400 UNITS: at 09:05

## 2023-05-07 RX ADMIN — ASPIRIN 81 MG: 81 TABLET, COATED ORAL at 09:05

## 2023-05-07 RX ADMIN — IPRATROPIUM BROMIDE AND ALBUTEROL SULFATE 3 ML: 2.5; .5 SOLUTION RESPIRATORY (INHALATION) at 05:05

## 2023-05-07 RX ADMIN — ZINC SULFATE 220 MG (50 MG) CAPSULE 220 MG: CAPSULE at 09:05

## 2023-05-07 RX ADMIN — SILVER SULFADIAZINE: 10 CREAM TOPICAL at 09:05

## 2023-05-07 RX ADMIN — GUAIFENESIN AND DEXTROMETHORPHAN 10 ML: 100; 10 SYRUP ORAL at 02:05

## 2023-05-07 RX ADMIN — PRAVASTATIN SODIUM 40 MG: 20 TABLET ORAL at 09:05

## 2023-05-07 RX ADMIN — GUAIFENESIN AND DEXTROMETHORPHAN 10 ML: 100; 10 SYRUP ORAL at 09:05

## 2023-05-07 RX ADMIN — FUROSEMIDE 20 MG: 20 TABLET ORAL at 09:05

## 2023-05-07 RX ADMIN — THERA TABS 1 TABLET: TAB at 09:05

## 2023-05-07 RX ADMIN — GUAIFENESIN AND DEXTROMETHORPHAN 10 ML: 100; 10 SYRUP ORAL at 08:05

## 2023-05-07 RX ADMIN — DICLOFENAC SODIUM 2 G: 10 GEL TOPICAL at 05:05

## 2023-05-07 NOTE — PLAN OF CARE
Problem: Adult Inpatient Plan of Care  Goal: Plan of Care Review  Outcome: Ongoing, Progressing  Goal: Patient-Specific Goal (Individualized)  Outcome: Ongoing, Progressing  Goal: Absence of Hospital-Acquired Illness or Injury  Outcome: Ongoing, Progressing  Goal: Optimal Comfort and Wellbeing  Outcome: Ongoing, Progressing  Goal: Readiness for Transition of Care  Outcome: Ongoing, Progressing     Problem: Diabetes Comorbidity  Goal: Blood Glucose Level Within Targeted Range  Outcome: Ongoing, Progressing     Problem: Fluid Imbalance (Pneumonia)  Goal: Fluid Balance  Outcome: Ongoing, Progressing     Problem: Infection (Pneumonia)  Goal: Resolution of Infection Signs and Symptoms  Outcome: Ongoing, Progressing     Problem: Respiratory Compromise (Pneumonia)  Goal: Effective Oxygenation and Ventilation  Outcome: Ongoing, Progressing     Problem: Impaired Wound Healing  Goal: Optimal Wound Healing  Outcome: Ongoing, Progressing     Problem: Fall Injury Risk  Goal: Absence of Fall and Fall-Related Injury  Outcome: Ongoing, Progressing     Problem: Skin Injury Risk Increased  Goal: Skin Health and Integrity  Outcome: Ongoing, Progressing

## 2023-05-08 LAB
ANION GAP SERPL CALC-SCNC: 11 MMOL/L (ref 8–16)
BASOPHILS # BLD AUTO: 0.05 K/UL (ref 0–0.2)
BASOPHILS NFR BLD: 1.3 % (ref 0–1.9)
BUN SERPL-MCNC: 37 MG/DL (ref 10–30)
CALCIUM SERPL-MCNC: 8.8 MG/DL (ref 8.7–10.5)
CHLORIDE SERPL-SCNC: 104 MMOL/L (ref 95–110)
CO2 SERPL-SCNC: 20 MMOL/L (ref 23–29)
CREAT SERPL-MCNC: 0.9 MG/DL (ref 0.5–1.4)
DIFFERENTIAL METHOD: ABNORMAL
EOSINOPHIL # BLD AUTO: 0.1 K/UL (ref 0–0.5)
EOSINOPHIL NFR BLD: 2.8 % (ref 0–8)
ERYTHROCYTE [DISTWIDTH] IN BLOOD BY AUTOMATED COUNT: 19.9 % (ref 11.5–14.5)
EST. GFR  (NO RACE VARIABLE): >60 ML/MIN/1.73 M^2
GLUCOSE SERPL-MCNC: 90 MG/DL (ref 70–110)
HCT VFR BLD AUTO: 27.1 % (ref 37–48.5)
HGB BLD-MCNC: 8.2 G/DL (ref 12–16)
IMM GRANULOCYTES # BLD AUTO: 0.05 K/UL (ref 0–0.04)
IMM GRANULOCYTES NFR BLD AUTO: 1.3 % (ref 0–0.5)
LYMPHOCYTES # BLD AUTO: 0.9 K/UL (ref 1–4.8)
LYMPHOCYTES NFR BLD: 22.5 % (ref 18–48)
MAGNESIUM SERPL-MCNC: 2 MG/DL (ref 1.6–2.6)
MCH RBC QN AUTO: 27.7 PG (ref 27–31)
MCHC RBC AUTO-ENTMCNC: 30.3 G/DL (ref 32–36)
MCV RBC AUTO: 92 FL (ref 82–98)
MONOCYTES # BLD AUTO: 0.4 K/UL (ref 0.3–1)
MONOCYTES NFR BLD: 9.6 % (ref 4–15)
NEUTROPHILS # BLD AUTO: 2.5 K/UL (ref 1.8–7.7)
NEUTROPHILS NFR BLD: 62.5 % (ref 38–73)
NRBC BLD-RTO: 0 /100 WBC
PHOSPHATE SERPL-MCNC: 3.9 MG/DL (ref 2.7–4.5)
PLATELET # BLD AUTO: 231 K/UL (ref 150–450)
PMV BLD AUTO: 10.9 FL (ref 9.2–12.9)
POCT GLUCOSE: 132 MG/DL (ref 70–110)
POCT GLUCOSE: 163 MG/DL (ref 70–110)
POCT GLUCOSE: 98 MG/DL (ref 70–110)
POCT GLUCOSE: 98 MG/DL (ref 70–110)
POTASSIUM SERPL-SCNC: 4.9 MMOL/L (ref 3.5–5.1)
RBC # BLD AUTO: 2.96 M/UL (ref 4–5.4)
SODIUM SERPL-SCNC: 135 MMOL/L (ref 136–145)
WBC # BLD AUTO: 3.96 K/UL (ref 3.9–12.7)

## 2023-05-08 PROCEDURE — 85025 COMPLETE CBC W/AUTO DIFF WBC: CPT | Mod: HCNC | Performed by: HOSPITALIST

## 2023-05-08 PROCEDURE — 97530 THERAPEUTIC ACTIVITIES: CPT | Mod: HCNC,CQ

## 2023-05-08 PROCEDURE — 84100 ASSAY OF PHOSPHORUS: CPT | Mod: HCNC | Performed by: HOSPITALIST

## 2023-05-08 PROCEDURE — 25000003 PHARM REV CODE 250: Mod: HCNC | Performed by: NURSE PRACTITIONER

## 2023-05-08 PROCEDURE — 97110 THERAPEUTIC EXERCISES: CPT | Mod: HCNC

## 2023-05-08 PROCEDURE — 25000003 PHARM REV CODE 250: Mod: HCNC | Performed by: HOSPITALIST

## 2023-05-08 PROCEDURE — 25000242 PHARM REV CODE 250 ALT 637 W/ HCPCS: Mod: HCNC | Performed by: HOSPITALIST

## 2023-05-08 PROCEDURE — 97110 THERAPEUTIC EXERCISES: CPT | Mod: HCNC,CQ

## 2023-05-08 PROCEDURE — 83735 ASSAY OF MAGNESIUM: CPT | Mod: HCNC | Performed by: HOSPITALIST

## 2023-05-08 PROCEDURE — 97530 THERAPEUTIC ACTIVITIES: CPT | Mod: HCNC

## 2023-05-08 PROCEDURE — 94640 AIRWAY INHALATION TREATMENT: CPT | Mod: HCNC

## 2023-05-08 PROCEDURE — 11000004 HC SNF PRIVATE: Mod: HCNC

## 2023-05-08 PROCEDURE — 36415 COLL VENOUS BLD VENIPUNCTURE: CPT | Mod: HCNC | Performed by: HOSPITALIST

## 2023-05-08 PROCEDURE — 80048 BASIC METABOLIC PNL TOTAL CA: CPT | Mod: HCNC | Performed by: HOSPITALIST

## 2023-05-08 PROCEDURE — 97116 GAIT TRAINING THERAPY: CPT | Mod: HCNC,CQ

## 2023-05-08 RX ORDER — SODIUM BICARBONATE 650 MG/1
650 TABLET ORAL ONCE
Status: COMPLETED | OUTPATIENT
Start: 2023-05-08 | End: 2023-05-08

## 2023-05-08 RX ADMIN — IPRATROPIUM BROMIDE AND ALBUTEROL SULFATE 3 ML: 2.5; .5 SOLUTION RESPIRATORY (INHALATION) at 11:05

## 2023-05-08 RX ADMIN — GUAIFENESIN AND DEXTROMETHORPHAN 10 ML: 100; 10 SYRUP ORAL at 02:05

## 2023-05-08 RX ADMIN — PRAVASTATIN SODIUM 40 MG: 20 TABLET ORAL at 09:05

## 2023-05-08 RX ADMIN — SENNOSIDES AND DOCUSATE SODIUM 1 TABLET: 50; 8.6 TABLET ORAL at 09:05

## 2023-05-08 RX ADMIN — DICLOFENAC SODIUM 2 G: 10 GEL TOPICAL at 05:05

## 2023-05-08 RX ADMIN — GUAIFENESIN AND DEXTROMETHORPHAN 10 ML: 100; 10 SYRUP ORAL at 10:05

## 2023-05-08 RX ADMIN — Medication 250 MG: at 09:05

## 2023-05-08 RX ADMIN — HYDROCORTISONE: 1 CREAM TOPICAL at 09:05

## 2023-05-08 RX ADMIN — SODIUM BICARBONATE 650 MG: 650 TABLET ORAL at 12:05

## 2023-05-08 RX ADMIN — ACETAMINOPHEN 1000 MG: 500 TABLET ORAL at 10:05

## 2023-05-08 RX ADMIN — ACETAMINOPHEN 1000 MG: 500 TABLET ORAL at 09:05

## 2023-05-08 RX ADMIN — GUAIFENESIN AND DEXTROMETHORPHAN 10 ML: 100; 10 SYRUP ORAL at 09:05

## 2023-05-08 RX ADMIN — TRIAMCINOLONE ACETONIDE: 1 CREAM TOPICAL at 09:05

## 2023-05-08 RX ADMIN — SENNOSIDES AND DOCUSATE SODIUM 1 TABLET: 50; 8.6 TABLET ORAL at 10:05

## 2023-05-08 RX ADMIN — DICLOFENAC SODIUM 2 G: 10 GEL TOPICAL at 01:05

## 2023-05-08 RX ADMIN — THERA TABS 1 TABLET: TAB at 09:05

## 2023-05-08 RX ADMIN — FUROSEMIDE 20 MG: 20 TABLET ORAL at 09:05

## 2023-05-08 RX ADMIN — DICLOFENAC SODIUM 2 G: 10 GEL TOPICAL at 10:05

## 2023-05-08 RX ADMIN — DICLOFENAC SODIUM 2 G: 10 GEL TOPICAL at 09:05

## 2023-05-08 RX ADMIN — ZINC SULFATE 220 MG (50 MG) CAPSULE 220 MG: CAPSULE at 09:05

## 2023-05-08 RX ADMIN — FLUTICASONE PROPIONATE 100 MCG: 50 SPRAY, METERED NASAL at 09:05

## 2023-05-08 RX ADMIN — ASPIRIN 81 MG: 81 TABLET, COATED ORAL at 09:05

## 2023-05-08 RX ADMIN — MICONAZOLE NITRATE: 20 OINTMENT TOPICAL at 09:05

## 2023-05-08 RX ADMIN — HYDROCORTISONE: 1 CREAM TOPICAL at 10:05

## 2023-05-08 RX ADMIN — FERROUS SULFATE TAB 325 MG (65 MG ELEMENTAL FE) 1 EACH: 325 (65 FE) TAB at 09:05

## 2023-05-08 RX ADMIN — SILVER SULFADIAZINE: 10 CREAM TOPICAL at 09:05

## 2023-05-08 RX ADMIN — SILVER SULFADIAZINE: 10 CREAM TOPICAL at 10:05

## 2023-05-08 RX ADMIN — Medication 400 UNITS: at 09:05

## 2023-05-08 NOTE — PROGRESS NOTES
[facility] - Skilled Nursing  Wound Care    Patient Name:  Jenniffer Jimenez   MRN:  [id number]  Date: 2023  Diagnosis: Acute on chronic diastolic heart failure    History:     Past Medical History:   Diagnosis Date    Amblyopia of left eye 04/10/2013    Arthritis     Facet arthropathy, Lumbosacral    Atherosclerosis of aorta     Cataract     Central retinal vein occlusion of left eye     CKD (chronic kidney disease) stage 3, GFR 30-59 ml/min     Diabetes mellitus, type 2     Diabetic polyneuropathy 2022    Essential (primary) hypertension     Exotropia of both eyes 2013    recession RSR 5.0mm w/ adj; recession LR os 5.0 w/ adj; resect MR os  4.0mm    Hearing loss     History of resection of small bowel     Hypertensive retinopathy of both eyes     Hypoglycemia     Macular degeneration     OA (osteoarthritis) of shoulder     Right    Osteoporosis     Paroxysmal atrial fibrillation     Posterior vitreous detachment of both eyes     Psychiatric problem     Rhinitis     TIA (transient ischemic attack)        Social History     Socioeconomic History    Marital status:    Occupational History    Occupation: retired   Tobacco Use    Smoking status: Former     Types: Cigarettes     Quit date: 10/29/1982     Years since quittin.5     Passive exposure: Never    Smokeless tobacco: Never   Substance and Sexual Activity    Alcohol use: Not Currently     Alcohol/week: 2.0 standard drinks     Types: 2 Shots of liquor per week     Comment: rare    Drug use: No    Sexual activity: Never   Other Topics Concern    Are you pregnant or think you may be? No    Breast-feeding No     Social Determinants of Health     Financial Resource Strain: Low Risk     Difficulty of Paying Living Expenses: Not very hard   Food Insecurity: No Food Insecurity    Worried About Running Out of Food in the Last Year: Never true    Ran Out of Food in the Last Year: Never true   Transportation Needs: No Transportation Needs     Lack of Transportation (Medical): No    Lack of Transportation (Non-Medical): No   Physical Activity: Inactive    Days of Exercise per Week: 0 days    Minutes of Exercise per Session: 0 min   Stress: No Stress Concern Present    Feeling of Stress : Only a little   Social Connections: Moderately Isolated    Frequency of Communication with Friends and Family: Twice a week    Frequency of Social Gatherings with Friends and Family: Once a week    Attends [employer] Services: Never    Active Member of Clubs or Organizations: Yes    Attends Club or Organization Meetings: Never    Marital Status:    Housing Stability: Low Risk     Unable to Pay for Housing in the Last Year: No    Number of Places Lived in the Last Year: 1    Unstable Housing in the Last Year: No       Precautions:     Allergies as of 04/27/2023 - Reviewed 04/27/2023   Allergen Reaction Noted    Opioids - morphine analogues Other (See Comments) 08/15/2018    Tizanidine Other (See Comments) 04/06/2018    Tramadol Hallucinations 01/18/2013    Beta-blockers (beta-adrenergic blocking agts) Other (See Comments) 10/23/2013    Morphine  12/24/2021    Opioids-meperidine and related  01/04/2022    Ciprofloxacin Rash 12/28/2022       [facility] Assessment Details/Treatment   Wound care follow-up  Recent visit and recommendations per Derm.   The BLE were cleansed with Vashe wound solution, dry skin peeling off in large patches- skin intact. Ointment applied as ordered, tubigrip F applied from toes to knees for light compression.   Discussed wound care, verbalized understanding.     Plan:   BLE -- cleanse with Vashe wound solution, Ointment applied as ordered, Right shin/calf- apply surpaabsorbant dressing (mextra/Euxdry) then  tubigrip F applied from toes to knees for light compression BID  Continue pressure prevention measures    Nursing to continue care, wound care will follow  Recommendations made to primary team for above plan per written report . Orders placed.       05/08/23 0730        Altered Skin Integrity 04/24/23 1136 Buttocks Moisture associated dermatitis Intact skin with non-blanchable redness of localized area   Date First Assessed/Time First Assessed: 04/24/23 1136   Altered Skin Integrity Present on Admission - Did Patient arrive to the hospital with altered skin?: yes  Location: (c) Buttocks  Is this injury device related?: No  Primary Wound Type: Moisture...   Wound Image    Dressing Appearance Open to air   Drainage Amount Scant   Drainage Characteristics/Odor Clear   Appearance Pink;Red   Tissue loss description Not applicable   Periwound Area Intact;Dry   Wound Edges Rolled/closed   Care Cleansed with:;Soap and water;Applied:;Skin Barrier        Altered Skin Integrity Left lower;lateral Leg   No Date First Assessed or Time First Assessed found.   Altered Skin Integrity Present on Admission - Did Patient arrive to the hospital with altered skin?: yes  Side: Left  Orientation: lower;lateral  Location: Leg   Wound Image    Dressing Appearance Dry;Intact;Clean   Drainage Amount None   Appearance Pink;Red;Dry   Tissue loss description Not applicable   Periwound Area Intact;Dry;Other (see comments)  (dry peeling skin)   Wound Edges Jagged;Irregular   Care Cleansed with:;Antimicrobial agent;Soap and water;Applied:;Skin Barrier   Dressing Applied;Elastic bandage   Dressing Change Due 05/08/23        Altered Skin Integrity Right anterior;lower;lateral;medial;posterior Leg   No Date First Assessed or Time First Assessed found.   Altered Skin Integrity Present on Admission - Did Patient arrive to the hospital with altered skin?: yes  Side: Right  Orientation: anterior;lower;lateral;medial;posterior  Location: Leg   Wound Image    Dressing Appearance Dry;Clean;Moist drainage   Drainage Amount Scant   Drainage Characteristics/Odor Clear;Serous   Appearance Pink;Red;Dry   Periwound Area Intact;Dry;Pink;Redness   Wound Edges Rolled/closed   Care Cleansed  with:;Antimicrobial agent;Applied:;Skin Barrier   Dressing Applied;Changed;Elastic bandage       05/08/2023

## 2023-05-08 NOTE — PROGRESS NOTES
[facility]                                  Skilled Nursing Facility                   Progress Note     Admit Date: 4/27/2023  GILES 5/18/2023  Principal Problem:  Acute on chronic diastolic heart failure   HPI obtained from patient interview and chart review     Chief Complaint: Re-evaluation of medical treatment and therapy status, lab review    HPI:   Jenniffer Jimenez is a 89 y.o. female with PMHx of cataract, [place], CKD, DM II, diabetic polyneuropathy, HTN, PAF, TIA presents to SNF following hospitalization for AFib RVR and presumed cellulitis to right lower extremity.     Interval history:  All of today's labs reviewed and are listed below.  Bicarb 20, BUN improved to 37 from 43.  24 hr vital sign ranges listed below.  Patient denies shortness of breath, abdominal discomfort, nausea, or vomiting.  Patient reports an adequate appetite.  Patient denies dysuria.  Patient reports having regular bowel movements.  Patient progessing with PT/OT- Gait: Patient ambulated 154 feet x 2 trials using RW with CGA. Patient with occasional attempt to exaggerate step length resulting in slow pace of gait and decreased stability/balance. PT with W/C in tow. Seated rest break between trials. Continuing to follow and treat all acute and chronic conditions      Past Medical History: Patient has a past medical history of Amblyopia of left eye (04/10/2013), Arthritis, Atherosclerosis of aorta, Cataract, Central retinal vein occlusion of left eye, CKD (chronic kidney disease) stage 3, GFR 30-59 ml/min, Diabetes mellitus, type 2, Diabetic polyneuropathy (01/06/2022), Essential (primary) hypertension, Exotropia of both eyes (02/06/2013), Hearing loss, History of resection of small bowel, Hypertensive retinopathy of both eyes, Hypoglycemia, Macular degeneration, OA (osteoarthritis) of shoulder, Osteoporosis, Paroxysmal atrial fibrillation, Posterior vitreous  detachment of both eyes, Psychiatric problem, Rhinitis, and TIA (transient ischemic attack).    Past Surgical History: Patient has a past surgical history that includes Cardiac catheterization; Inner ear surgery; Sinus surgery; Tonsillectomy;  section, classic; Appendectomy; Strabismus surgery (13); Strabismus surgery (2014); Cataract extraction w/  intraocular lens implant (Bilateral); Hysterectomy; Oophorectomy; Joint replacement; Closure of left atrial appendage using device (N/A, 2020); and watchman surgery (N/A, 2020).    Social History: Patient reports that she quit smoking about 40 years ago. Her smoking use included cigarettes. She has never been exposed to tobacco smoke. She has never used smokeless tobacco. She reports that she does not currently use alcohol after a past usage of about 2.0 standard drinks per week. She reports that she does not use drugs.    Family History: family history includes Breast cancer in her maternal aunt; Diabetes in her brother and sister; Heart disease in her sister and sister; Hypertension in her father and mother; Liver disease in her sister; No Known Problems in her daughter, maternal grandfather, maternal grandmother, maternal uncle, paternal aunt, paternal grandfather, paternal grandmother, paternal uncle, son, son, and son.    Allergies: Patient is allergic to opioids - morphine analogues, tizanidine, tramadol, beta-blockers (beta-adrenergic blocking agts), morphine, opioids-meperidine and related, and ciprofloxacin.    ROS  Constitutional: Negative for fever   Eyes: Negative for blurred vision, double vision   Respiratory: Negative for shortness of breath.  +productive cough   Cardiovascular: Negative for chest pain, palpitations.  + leg swelling.   Gastrointestinal: Negative for abdominal pain, constipation, diarrhea, nausea, vomiting.   Genitourinary: Negative for dysuria, frequency   Musculoskeletal:  + generalized weakness.  + for back pain  and myalgias.   Skin:  + for itching and rash.   Neurological: Negative for dizziness, headaches.   Psychiatric/Behavioral: Negative for depression. The patient is not nervous/anxious.      24 hour Vital Sign Range   Temp:  [98.1 °F (36.7 °C)-98.5 °F (36.9 °C)]   Pulse:  [69-75]   Resp:  [18-20]   BP: (110-123)/(56-60)   SpO2:  [96 %-98 %]     Current BMI: Body mass index is 27 kg/m².    PEx  Constitutional: Patient appears debilitated.  No distress noted  HENT:   Head: Normocephalic and atraumatic.   Eyes: Pupils are equal, round  Neck: Normal range of motion. Neck supple.   Cardiovascular: Normal rate, irregular rhythm and normal heart sounds.    Pulmonary/Chest: Effort normal and breath sounds are clear  Abdominal: Soft. Bowel sounds are normal.   Musculoskeletal: Normal range of motion.   Neurological: Alert and oriented to person, place, and time.   Psychiatric: Normal mood and affect. Behavior is normal.   Skin: Skin is warm and dry.       05/02/23 0900         Altered Skin Integrity 04/24/23 1136 Buttocks Moisture associated dermatitis Intact skin with non-blanchable redness of localized area   Date First Assessed/Time First Assessed: 04/24/23 1136   Altered Skin Integrity Present on Admission - Did Patient arrive to the hospital with altered skin?: yes  Location: (c) Buttocks  Is this injury device related?: No  Primary Wound Type: Moisture...   Dressing Appearance Open to air   Drainage Amount Scant   Drainage Characteristics/Odor Clear   Appearance Pink;Red;Moist   Tissue loss description Not applicable   Periwound Area Dry   Wound Edges Rolled/closed   Care Cleansed with:;Soap and water;Applied:;Skin Barrier;Other (see comments)  (miconazole ointment)   Dressing Change Due 05/02/23        Altered Skin Integrity Left lower;lateral Leg   No Date First Assessed or Time First Assessed found.   Altered Skin Integrity Present on Admission - Did Patient arrive to the hospital with altered skin?: yes  Side: Left   Orientation: lower;lateral  Location: Leg   Dressing Appearance Dry;Intact;Clean   Drainage Amount Scant   Drainage Characteristics/Odor Clear   Appearance Red;Dry   Tissue loss description Not applicable   Periwound Area Intact;Dry;Excoriated;Redness  (miconazole ointment)   Care Cleansed with:;Antimicrobial agent;Applied:;Skin Barrier        Altered Skin Integrity Right anterior;lower;lateral;medial;posterior Leg   No Date First Assessed or Time First Assessed found.   Altered Skin Integrity Present on Admission - Did Patient arrive to the hospital with altered skin?: yes  Side: Right  Orientation: anterior;lower;lateral;medial;posterior  Location: Leg   Dressing Appearance Dry;Intact;Clean   Drainage Amount Scant   Drainage Characteristics/Odor Clear   Appearance Red;Dry   Tissue loss description Not applicable   Periwound Area Intact;Excoriated;Redness   Care Cleansed with:;Antimicrobial agent;Applied:;Skin Barrier  (micnoazole ointment)   Dressing Change Due 05/02/23        Rash 04/24/23 1047 Left anterior Groin   Date of First Assessment/Time of First Assessment: 04/24/23 1047   Present Prior to Hospital Arrival?: Yes  Side: Left  Orientation: anterior  Location: Groin   Distribution regional   Configuration/Shape asymmetric   Characteristics redness/erythema;pruritus/itching;burning   Color deep red;red        Rash 04/24/23 1047 Right anterior Groin   Date of First Assessment/Time of First Assessment: 04/24/23 1047   Present Prior to Hospital Arrival?: Yes  Side: Right  Orientation: anterior  Location: Groin   Distribution regional   Configuration/Shape asymmetric   Characteristics redness/erythema;pruritus/itching;burning         Recent Labs   Lab 05/08/23  0542   *   K 4.9      CO2 20*   BUN 37*   CREATININE 0.9   MG 2.0         Recent Labs   Lab 05/08/23  0542   WBC 3.96   RBC 2.96*   HGB 8.2*   HCT 27.1*      MCV 92   MCH 27.7   MCHC 30.3*           Recent Labs   Lab 05/06/23 2026  "05/07/23  0712 05/07/23  1123 05/07/23  1700 05/07/23 2032 05/08/23  0710   POCTGLUCOSE 147* 112* 137* 95 192* 98        Assessment and Plan:    Stage 3b chronic kidney disease  - sCr baseline around 1.2  - improving, continue reduced Lasix 20 mg daily, avoid nephrotoxic agents, renally dose medications when appropriate.    Metabolic acidosis, mild  - initiated sodium bicarbonate 650 mg x 1 dose    Acute on chronic diastolic heart failure  - last echo from 10/07/2022 with EF of 55%  - monitor Is&Os and daily weights.    - Fluid restriction of 1.5 L, cardiac diet  -   - continue Lasix 20mg daily     Cellulitis BLE  Chronic lower extremity wounds  - completed Amoxicillin and Doxycycline x 4 days end date 4/30  - Bob supplements, zinc, multivitamin, ascorbic acid, continue vitamin-E  -  Dermatology appointment today, updated orders to reflect recommendations  - patient will need to follow-up with her previous wound care doctor, daughter to make appointment     Community acquired pneumonia of left lower lobe of lung  Productive cough  - completed Amoxicillin and Doxycycline x 4 days  - persisting, change Mucinex DM to liquid formula, CPT BID x3 days    Chronic a-fib  - [id number] Score: 4. HASBLED Score:  Anticoagulation not on anticoagulation due to watchman device, on asa only   -  BB on allergy list   - Can use CCB (PO dilt) if rate control needed in light of BB "allergy"     Microcytic anemia  - continue ferrous sulfate daily  - stable, continue to monitor twice weekly CBC, Transfuse if Hgb < 7 or symptomatic     Intertrigo  - severe, continue fluconazole 200 mg daily x9 days  - referral placed to Dermatology for patient to have further evaluation      Presence of Watchman left atrial appendage closure device  - No need for anticoagulation      Venous insufficiency of both lower extremities  - continue ASA, pravastatin 40 mg daily     Prediabetes  - last A1c 6.0 on 01/26/2023  - cardiac diet  - Accu-Cheks " AC/HS     History of TIA (transient ischemic attack)  - Continue ASA 81 mg daily, pravastatin     Pure hypercholesterolemia  - continue pravastatin 40 mg daily    Debility   - Continue with PT/OT for gait training and strengthening and restoration of ADL's   - Encourage mobility, OOB in chair, and early ambulation as appropriate  - Fall precautions   - Monitor for bowel and bladder dysfunction  - Monitor for and prevent skin breakdown and pressure ulcers  - Continue DVT prophylaxis with frequent ambulation              Anticipate disposition:  Home with home health      Follow-up needed during SNF stay-    Follow-up needed after discharge from SNF: PCP, care clinic    Future Appointments   Date Time Provider Department Center   6/15/2023 11:15 AM Lennie Louis DPM [facility] POD [address]   6/29/2023  8:30 AM Rachael Case MD [facility] IM [place]   7/12/2023 11:30 AM Brent Chapman Jr., MD [facility] CARDIO [place]         Suzy Aparicio NP  Department of [facility] Medicine   [facility]- Skilled Nursing Facility     DOS: 5/8/2023       Patient note was created using MModal Dictation.  Any errors in syntax or even information may not have been identified and edited on initial review prior to signing this note.

## 2023-05-08 NOTE — PLAN OF CARE
Interdisciplinary team, Elsy Bhat PT, Marquise Chin LMSW, Priyanka Brandon, Activities, Divya Simon RN and Lynn Aldridge, Dietician, spoke to patient and daughter for care plan conference, weekly status update, and therapy progress update. Tentative discharge date set for 5/12/23

## 2023-05-08 NOTE — TREATMENT PLAN
"Rehab Services' DME recommendations    Jenniffer Jimenez  MRN: [id number]      [x] Walker Adult (5'1"-6"6")    Wheels Yes     [x] Home health PT, OT, and Aide      Kaila Bradford, PTA 5/8/2023      "

## 2023-05-08 NOTE — PT/OT/SLP PROGRESS
"Occupational Therapy   Treatment    Name: Jenniffer Jimenez  MRN: [id number]  Admit Date: 4/27/2023  Admitting Diagnosis:  Acute on chronic diastolic heart failure    General Precautions: Standard, fall, hearing impaired   Orthopedic Precautions: N/A   Braces: N/A    Recommendations:     Discharge Recommendations:  home health OT  Level of Assistance Recommended at Discharge: 24 hours light assistance for ADL's and homemaking tasks  Discharge Equipment Recommendations: walker, rolling  Barriers to discharge:  None    Assessment:     Jenniffer Jimenez is a 89 y.o. female with a medical diagnosis of Acute on chronic diastolic heart failure.   Pt tolerated session well and without incident, but she continues to require assistance to perform self-care tasks and mobility.  She would continue to benefit from skilled OT services at SNF to maximize her gains in functional independence.  She presents with the following.  Performance deficits affecting function are weakness, impaired endurance, impaired self care skills, impaired functional mobility, gait instability, impaired balance, decreased lower extremity function, impaired skin, decreased ROM, decreased upper extremity function, impaired cardiopulmonary response to activity.     Rehab Potential is good    Activity tolerance:  Good    Plan:     Patient to be seen 5 x/week to address the above listed problems via self-care/home management, therapeutic activities, therapeutic exercises    Plan of Care Expires: 05/19/23  Plan of Care Reviewed with: patient    Subjective   "The cellulitis hurts my legs when I touch them."  Communicated with: nursing prior to session.    Pain/Comfort:  Pain Rating 1: 0/10  Location - Side 1: Left  Location - Orientation 1: generalized  Location 1: leg (only when attempting to use shoe horn to doff sock)  Pain Addressed 1: Cessation of Activity  Pain Rating Post-Intervention 1: 0/10    Patient's cultural, spiritual, Moravian conflicts " given the current situation:  No    Objective:     Patient found up in bedside chair with Other (comments) (no lines attached) upon OT entry to room.    Bed Mobility:    N/A due to pt sitting in bedside chair at beginning and end of session    Functional Mobility/Transfers:  Patient completed Sit <> Stand Transfer from bedside chair x 1 trial and from w/c x 2 trials with CGA with RW, taking increased time to perform during last stand from w/c  Patient completed Bedside Chair <> Wheelchair <> Bedside Chair Transfers using Step Transfer technique with contact guard assistance with rolling walker  Functional Mobility: Pt ambulated ~5 ft x 2 trials with CGA with RW.  She also propelled w/c ~44 ft with SBA before fatiguing.     Activities of Daily Living:  Lower Body Dressing: Pt attempted to doff sock on her L foot with shoe horn but verbalized pain when shoe horn touched her skin; activity terminated.     [facility] 6 Click ADL: 17    OT Exercises: [facility]MM P[place] performed to increase functional endurance and strength in order increase independence when performing self care tasks, functional ambulation, W/C propulsion, and functional standing activities.  She performed shoulder flexion using her RUE to assist for several reps.  Pt later performed functional reaching/simulated cleaning activity while seated at the rehab gym table, placing her L hand on top of a wash cloth and her R hand over her L hand.  Pt moved the cloth forward and backward and then side to side on the table for several reps.     Treatment & Education:    - Pt performed matching activity in standing to address her standing endurance that's required for daily activities, such as ADLs and functional transfers.  Activity facilitated functional reaching, BUE use, visual scanning, and weight shifting in multiple directions.  She stood with SBA with RW at adjustable rehab counter for 6 min and 50 sec, completing the task.  Noted that pt was gently wheezing during  activity but had no complaints of dizziness.    Pt edu on role of OT, POC, safety when performing self care tasks, benefit of performing OOB activity, and safety when performing functional transfers and mobility.    - Self care tasks completed-- as noted above      Patient left up in bedside chair with call button in reach and  present    GOALS:   Multidisciplinary Problems       Occupational Therapy Goals          Problem: Occupational Therapy    Goal Priority Disciplines Outcome Interventions   Occupational Therapy Goal     OT, PT/OT Ongoing, Progressing    Description: Goals to be met by: 5/19/23     Patient will increase functional independence with ADLs by performing:    UE Dressing with Stand-by Assistance.  LE Dressing with Stand-by Assistance and Assistive Devices as needed.  Grooming while standing at sink with Supervision.  Toileting from toilet with Supervision for hygiene and clothing management.   Bathing from  standing at sink with Stand-by Assistance.  Supine to sit with Supervision.  Step transfer with Supervision  Upper extremity exercise program with assistance as needed.                         Time Tracking:     OT Date of Treatment: 05/08/23  OT Start Time: 1027    OT Stop Time: 1111  OT Total Time (min): 44 min    Billable Minutes:Therapeutic Activity 29 min  Therapeutic Exercise 15 min    5/8/2023

## 2023-05-08 NOTE — PT/OT/SLP PROGRESS
"Physical Therapy Treatment    Patient Name:  Jenniffer Jimenez   MRN:  [id number]  Admit Date: 4/27/2023  Admitting Diagnosis: Acute on chronic diastolic heart failure  Recent Surgeries:     General Precautions: Standard, fall, hearing impaired  Orthopedic Precautions: N/A  Braces: N/A    Recommendations:     Discharge Recommendations: home health PT  Level of Assistance Recommended at Discharge: 24 hours light assistance  Discharge Equipment Recommendations: none  Barriers to discharge: None    Assessment:     Jenniffer Jimenez is a 89 y.o. female admitted with a medical diagnosis of Acute on chronic diastolic heart failure . Pt tolerated well, occ vcs for taking her time for inc safety, pt would continue to benefit from skilled PT services to improve overall functional mobility, strength and endurance.  .      Performance deficits affecting function: weakness, impaired endurance, impaired self care skills, impaired functional mobility, gait instability, impaired balance, decreased safety awareness, decreased lower extremity function.    Rehab Potential is good    Activity Tolerance: Fair    Plan:     Patient to be seen 5 x/week to address the above listed problems via gait training, therapeutic activities, therapeutic exercises, neuromuscular re-education, wheelchair management/training    Plan of Care Expires: 05/28/23  Plan of Care Reviewed with: patient    Subjective     "I need to use the bathroom".     Pain/Comfort:  Pain Rating 1: 0/10  Pain Rating Post-Intervention 1: 0/10    Patient's cultural, spiritual, Yazidism conflicts given the current situation:  yes    Objective:      Patient found with  (in BSC) upon PT entry to room.     Therapeutic Activities and Exercises: mini elliptical x15 min    Functional Mobility:  Transfers:     Sit to Stand:  stand by assistance with rolling walker  Toilet Transfer: contact guard /SB assistance with  rolling walker  using  Stand Pivot  Some A with clothing mgmt, nelida " "care SBA in sitting, hand hygiene in standing with RW CGA  Gait: amb with RW CGA ~ 188 ft and 130 ft seated rest break no LOB loud vcs on R side better hearing  Curb asc/walker 4" curb with RW min/CGA vcs for safety/tech 2nd person close by for safety    AM-PAC 6 CLICK MOBILITY  16    Patient left up in chair with call button in reach, daug present with IDT meeting, and PCT will trfs to BSC after IDT finishes.    GOALS:   Multidisciplinary Problems       Physical Therapy Goals          Problem: Physical Therapy    Goal Priority Disciplines Outcome Goal Variances Interventions   Physical Therapy Goal     PT, PT/OT Ongoing, Progressing     Description: Goals to be met by: 5/28/23     Patient will increase functional independence with mobility by performing:    . Supine to sit with Set-up [place]  . Sit to supine with Set-up [place]  . Rolling to Left and Right with Modified [place]  . Sit to stand transfer with Supervision - not met, progressing  . Bed to chair transfer with Supervision using Rolling Walker - not met, progressing  . Gait  x 150 feet with Stand-by Assistance using Rolling Walker - not met, progressing  . Wheelchair propulsion x 150 feet with Stand-by Assistance using bilateral upper extremities - not met, progressing  . Ascend/Descend 4 inch curb step with Contact Guard Assistance using Rolling Walker - not met, progressing                         Time Tracking:     PT Received On: 05/08/23  PT Start Time: 1412  PT Stop Time: 1457  PT Total Time (min): 45 min    Billable Minutes: Gait Training 15, Therapeutic Activity 15, and Therapeutic Exercise 15    Treatment Type: Treatment  PT/PTA: PTA     Number of PTA visits since last PT visit: 1 05/08/2023  "

## 2023-05-09 LAB
POCT GLUCOSE: 110 MG/DL (ref 70–110)
POCT GLUCOSE: 112 MG/DL (ref 70–110)
POCT GLUCOSE: 123 MG/DL (ref 70–110)
POCT GLUCOSE: 192 MG/DL (ref 70–110)

## 2023-05-09 PROCEDURE — 97110 THERAPEUTIC EXERCISES: CPT | Mod: HCNC,CQ

## 2023-05-09 PROCEDURE — 25000003 PHARM REV CODE 250: Mod: HCNC | Performed by: NURSE PRACTITIONER

## 2023-05-09 PROCEDURE — 94761 N-INVAS EAR/PLS OXIMETRY MLT: CPT | Mod: HCNC

## 2023-05-09 PROCEDURE — 97530 THERAPEUTIC ACTIVITIES: CPT | Mod: HCNC,CQ

## 2023-05-09 PROCEDURE — 11000004 HC SNF PRIVATE: Mod: HCNC

## 2023-05-09 PROCEDURE — 97116 GAIT TRAINING THERAPY: CPT | Mod: HCNC,CQ

## 2023-05-09 PROCEDURE — 97535 SELF CARE MNGMENT TRAINING: CPT | Mod: HCNC,CO

## 2023-05-09 PROCEDURE — 25000003 PHARM REV CODE 250: Mod: HCNC | Performed by: HOSPITALIST

## 2023-05-09 RX ADMIN — ZINC SULFATE 220 MG (50 MG) CAPSULE 220 MG: CAPSULE at 09:05

## 2023-05-09 RX ADMIN — MICONAZOLE NITRATE: 20 OINTMENT TOPICAL at 09:05

## 2023-05-09 RX ADMIN — DICLOFENAC SODIUM 2 G: 10 GEL TOPICAL at 01:05

## 2023-05-09 RX ADMIN — SILVER SULFADIAZINE: 10 CREAM TOPICAL at 09:05

## 2023-05-09 RX ADMIN — TRIAMCINOLONE ACETONIDE: 1 CREAM TOPICAL at 09:05

## 2023-05-09 RX ADMIN — GUAIFENESIN AND DEXTROMETHORPHAN 10 ML: 100; 10 SYRUP ORAL at 09:05

## 2023-05-09 RX ADMIN — DICLOFENAC SODIUM 2 G: 10 GEL TOPICAL at 05:05

## 2023-05-09 RX ADMIN — PRAVASTATIN SODIUM 40 MG: 20 TABLET ORAL at 09:05

## 2023-05-09 RX ADMIN — DICLOFENAC SODIUM 2 G: 10 GEL TOPICAL at 09:05

## 2023-05-09 RX ADMIN — ACETAMINOPHEN 1000 MG: 500 TABLET ORAL at 09:05

## 2023-05-09 RX ADMIN — Medication 400 UNITS: at 09:05

## 2023-05-09 RX ADMIN — SENNOSIDES AND DOCUSATE SODIUM 1 TABLET: 50; 8.6 TABLET ORAL at 09:05

## 2023-05-09 RX ADMIN — HYDROCORTISONE: 1 CREAM TOPICAL at 09:05

## 2023-05-09 RX ADMIN — FUROSEMIDE 20 MG: 20 TABLET ORAL at 09:05

## 2023-05-09 RX ADMIN — GUAIFENESIN AND DEXTROMETHORPHAN 10 ML: 100; 10 SYRUP ORAL at 03:05

## 2023-05-09 RX ADMIN — FERROUS SULFATE TAB 325 MG (65 MG ELEMENTAL FE) 1 EACH: 325 (65 FE) TAB at 09:05

## 2023-05-09 RX ADMIN — THERA TABS 1 TABLET: TAB at 09:05

## 2023-05-09 RX ADMIN — FLUTICASONE PROPIONATE 100 MCG: 50 SPRAY, METERED NASAL at 09:05

## 2023-05-09 RX ADMIN — Medication 250 MG: at 09:05

## 2023-05-09 RX ADMIN — ASPIRIN 81 MG: 81 TABLET, COATED ORAL at 09:05

## 2023-05-09 NOTE — PT/OT/SLP PROGRESS
"Physical Therapy Treatment    Patient Name:  Jenniffer Jimenez   MRN:  [id number]  Admit Date: 4/27/2023  Admitting Diagnosis: Acute on chronic diastolic heart failure  Recent Surgeries:     General Precautions: Standard, fall, hearing impaired  Orthopedic Precautions: N/A  Braces: N/A    Recommendations:     Discharge Recommendations: home health PT  Level of Assistance Recommended at Discharge: 24 hours light assistance  Discharge Equipment Recommendations: none  Barriers to discharge: None    Assessment:     Jenniffer Jimenez is a 89 y.o. female admitted with a medical diagnosis of Acute on chronic diastolic heart failure . Pt tolerated well, pt would continue to benefit from skilled PT services to improve overall functional mobility, strength and endurance.  .      Performance deficits affecting function: weakness, impaired endurance, impaired self care skills, impaired functional mobility, gait instability, impaired balance, decreased safety awareness, decreased lower extremity function.    Rehab Potential is good    Activity Tolerance: Fair    Plan:     Patient to be seen 5 x/week to address the above listed problems via gait training, therapeutic activities, therapeutic exercises, neuromuscular re-education, wheelchair management/training    Plan of Care Expires: 05/28/23  Plan of Care Reviewed with: patient    Subjective     "I'm ready". "Can I practice the steps and the bed again?"    Pain/Comfort:  Pain Rating 1: 6/10  Location - Side 1: Bilateral  Location - Orientation 1: lower  Location 1: leg  Pain Addressed 1: Pre-medicate for activity, Reposition, Distraction, Cessation of Activity, Nurse notified  Pain Rating Post-Intervention 1: 6/10 (no further mentioned)    Patient's cultural, spiritual, Quaker conflicts given the current situation:  yes    Objective:       Patient found with  (in WC) upon PT entry to room. Discussed with PT/cleared to perform steps, PT will set goal    Therapeutic " "Activities and Exercises: mini elliptical x 15 min    Functional Mobility:  Bed Mobility:     Supine to Sit: supervision and on mat with wedge  Sit to Supine: supervision and on mat with wedge  Transfers:     Sit to Stand:  stand by assistance with rolling walker  Chair to mat: contact guard assistance with  rolling walker  using  Stand Pivot  Toilet Transfer: contact guard assistance with  rolling walker  using  Stand Pivot  Some A with clothing mgt, CGA for post nelida care and hand hygiene in standing  Gait: amb with RW CGA ~ 150 ft total, 1 stop for toileting along the way  Stairs: asc/de 4 step with BHR CGA, asc BUE on BHR, to walker BUE on 1 rail vcs for safety/tech, ed pt not to do on her own after d/c only with therapy, to include trfs/gait with assistance, pt verbalized understanding  Curb asc/walker 4" curb with RW CGA    AM-PAC 6 CLICK MOBILITY  18    Patient left up in chair with call button in reach and belongings in reach .    GOALS:   Multidisciplinary Problems       Physical Therapy Goals          Problem: Physical Therapy    Goal Priority Disciplines Outcome Goal Variances Interventions   Physical Therapy Goal     PT, PT/OT Ongoing, Progressing     Description: Goals to be met by: 5/28/23     Patient will increase functional independence with mobility by performing:    . Supine to sit with Set-up [place]  . Sit to supine with Set-up [place]  . Rolling to Left and Right with Modified [place]  . Sit to stand transfer with Supervision - not met, progressing  . Bed to chair transfer with Supervision using Rolling Walker - not met, progressing  . Gait  x 150 feet with Stand-by Assistance using Rolling Walker - not met, progressing  . Wheelchair propulsion x 150 feet with Stand-by Assistance using bilateral upper extremities - not met, progressing  . Ascend/Descend 4 inch curb step with Contact Guard Assistance using Rolling Walker - not met, progressing                         Time Tracking: "     PT Received On: 05/09/23  PT Start Time: 0844  PT Stop Time: 0925  PT Total Time (min): 41 min    Billable Minutes: Gait Training 11, Therapeutic Activity 15, and Therapeutic Exercise 15    Treatment Type: Treatment  PT/PTA: PTA     Number of PTA visits since last PT visit: 2     05/09/2023

## 2023-05-09 NOTE — PT/OT/SLP PROGRESS
"Occupational Therapy   Treatment    Name: Jenniffer Jimenez  MRN: [id number]  Admit Date: 4/27/2023  Admitting Diagnosis:  Acute on chronic diastolic heart failure    General Precautions: Standard, fall, hearing impaired   Orthopedic Precautions: N/A   Braces: N/A    Recommendations:     Discharge Recommendations:  home health OT  Level of Assistance Recommended at Discharge: 24 hours light assistance for ADL's and homemaking tasks  Discharge Equipment Recommendations: walker, rolling  Barriers to discharge:  None    Assessment:     Jenniffer Jimenez is a 89 y.o. female with a medical diagnosis of Acute on chronic diastolic heart failure .  She presents with performance deficits affecting function are weakness, impaired endurance, impaired self care skills, impaired functional mobility, gait instability, impaired balance, decreased lower extremity function, impaired skin, decreased ROM, decreased upper extremity function, impaired cardiopulmonary response to activity.     Pt participated well during today's session. Pt tolerated tx session without incident and is making progress, however, continues to demonstrate deficits with self care skills, balance, functional mobility, UB strength and endurance. Pt will benefit from continued OT services to progress towards goals.     Rehab Potential is good    Activity tolerance:  Good    Plan:     Patient to be seen 5 x/week to address the above listed problems via self-care/home management, therapeutic activities, therapeutic exercises    Plan of Care Expires: 05/19/23  Plan of Care Reviewed with: patient    Subjective   "I'm ready to get up for the day"  Communicated with: Octavia prior to session.  .    Pain/Comfort:  Pain Rating 1: 6/10  Location - Side 1: Left  Location - Orientation 1: generalized  Location 1: leg  Pain Addressed 1: Nurse notified, Reposition, Distraction  Pain Rating Post-Intervention 1: 6/10    Patient's cultural, spiritual, Presybeterian conflicts given " the current situation:  yes    Objective:     Patient found HOB elevated upon OT entry to room.    Bed Mobility:    Patient completed Rolling/Turning to Right with supervision  Patient completed Scooting/Bridging with supervision  Patient completed Supine to Sit with supervision     Functional Mobility/Transfers:  Patient completed Sit <> Stand Transfer with stand by assistance  with  rolling walker   Patient completed Bed <> Chair Transfer using Step Transfer technique with stand by assistance with rolling walker  Patient completed Toilet Transfer Step Transfer technique with stand by assistance with  rolling walker  Functional Mobility:     Activities of Daily Living:  Grooming: modified independence to perform oral/facial hygiene and apply makeup seated at sink.   Upper Body Dressing: minimum assistance to doff gown and don pullover shirt.   Lower Body Dressing: moderate assistance to thread B LE into pants and undergarment. (A) provided in stance to manage over hips in stance.   Toileting: stand by assistance to perform nelida hygiene seated on commode.     [facility] 6 Click ADL: 17    Treatment & Education:  Pt educated on:  - role of OT  - level of assistance  - adaptive equipment  -  safety while performing functional transfers and self care tasks,   - progress towards OT goals.     Patient left up in chair with  PTA present.    GOALS:   Multidisciplinary Problems       Occupational Therapy Goals          Problem: Occupational Therapy    Goal Priority Disciplines Outcome Interventions   Occupational Therapy Goal     OT, PT/OT Ongoing, Progressing    Description: Goals to be met by: 5/19/23     Patient will increase functional independence with ADLs by performing:    UE Dressing with Stand-by Assistance.  LE Dressing with Stand-by Assistance and Assistive Devices as needed.  Grooming while standing at sink with Supervision.  Toileting from toilet with Supervision for hygiene and clothing management.   Bathing from   standing at sink with Stand-by Assistance.  Supine to sit with Supervision.  Step transfer with Supervision  Upper extremity exercise program with assistance as needed.                         Time Tracking:     OT Date of Treatment: 05/09/23  OT Start Time: 0820    OT Stop Time: 0843  OT Total Time (min): 23 min    Billable Minutes:Self Care/Home Management 23 5/9/2023

## 2023-05-09 NOTE — PROGRESS NOTES
[facility]                                  Skilled Nursing Facility                   Progress Note     Admit Date: 4/27/2023  GILES 5/12/2023  Principal Problem:  Acute on chronic diastolic heart failure   HPI obtained from patient interview and chart review     Chief Complaint: Re-evaluation of medical treatment and therapy status    HPI:   Jenniffer Jimenez is a 89 y.o. female with PMHx of cataract, [place], CKD, DM II, diabetic polyneuropathy, HTN, PAF, TIA presents to SNF following hospitalization for AFib RVR and presumed cellulitis to right lower extremity.     Interval history:  24 hr vital sign ranges listed below.  Patient denies shortness of breath, abdominal discomfort, nausea, or vomiting.  Patient reports an adequate appetite.  Patient denies dysuria.  Patient reports having regular bowel movements.  Patient progessing with PT/OT-Gait: amb with RW CGA ~ 188 ft and 130 ft seated rest break no LOB loud vcs on R side better hearing. Continuing to follow and treat all acute and chronic conditions.    Past Medical History: Patient has a past medical history of Amblyopia of left eye (04/10/2013), Arthritis, Atherosclerosis of aorta, Cataract, Central retinal vein occlusion of left eye, CKD (chronic kidney disease) stage 3, GFR 30-59 ml/min, Diabetes mellitus, type 2, Diabetic polyneuropathy (01/06/2022), Essential (primary) hypertension, Exotropia of both eyes (02/06/2013), Hearing loss, History of resection of small bowel, Hypertensive retinopathy of both eyes, Hypoglycemia, Macular degeneration, OA (osteoarthritis) of shoulder, Osteoporosis, Paroxysmal atrial fibrillation, Posterior vitreous detachment of both eyes, Psychiatric problem, Rhinitis, and TIA (transient ischemic attack).    Past Surgical History: Patient has a past surgical history that includes Cardiac catheterization; Inner ear surgery; Sinus surgery; Tonsillectomy;   section, classic; Appendectomy; Strabismus surgery (13); Strabismus surgery (2014); Cataract extraction w/  intraocular lens implant (Bilateral); Hysterectomy; Oophorectomy; Joint replacement; Closure of left atrial appendage using device (N/A, 2020); and watchman surgery (N/A, 2020).    Social History: Patient reports that she quit smoking about 40 years ago. Her smoking use included cigarettes. She has never been exposed to tobacco smoke. She has never used smokeless tobacco. She reports that she does not currently use alcohol after a past usage of about 2.0 standard drinks per week. She reports that she does not use drugs.    Family History: family history includes Breast cancer in her maternal aunt; Diabetes in her brother and sister; Heart disease in her sister and sister; Hypertension in her father and mother; Liver disease in her sister; No Known Problems in her daughter, maternal grandfather, maternal grandmother, maternal uncle, paternal aunt, paternal grandfather, paternal grandmother, paternal uncle, son, son, and son.    Allergies: Patient is allergic to opioids - morphine analogues, tizanidine, tramadol, beta-blockers (beta-adrenergic blocking agts), morphine, opioids-meperidine and related, and ciprofloxacin.    ROS  Constitutional: Negative for fever   Eyes: Negative for blurred vision, double vision   Respiratory: Negative for shortness of breath.  +productive cough   Cardiovascular: Negative for chest pain, palpitations.  + leg swelling.   Gastrointestinal: Negative for abdominal pain, constipation, diarrhea, nausea, vomiting.   Genitourinary: Negative for dysuria, frequency   Musculoskeletal:  + generalized weakness.  + for back pain and myalgias.   Skin:  + for itching and rash.   Neurological: Negative for dizziness, headaches.   Psychiatric/Behavioral: Negative for depression. The patient is not nervous/anxious.      24 hour Vital Sign Range   Temp:  [97.3 °F (36.3  °C)]   Pulse:  [70-71]   Resp:  [16-18]   BP: (128-141)/(58-66)   SpO2:  [96 %-97 %]     Current BMI: Body mass index is 27.33 kg/m².    PEx  Constitutional: Patient appears debilitated.  No distress noted  HENT:   Head: Normocephalic and atraumatic.   Eyes: Pupils are equal, round  Neck: Normal range of motion. Neck supple.   Cardiovascular: Normal rate, irregular rhythm and normal heart sounds.    Pulmonary/Chest: Effort normal and breath sounds are clear  Abdominal: Soft. Bowel sounds are normal.   Musculoskeletal: Normal range of motion.   Neurological: Alert and oriented to person, place, and time.   Psychiatric: Normal mood and affect. Behavior is normal.   Skin: Skin is warm and dry.       05/08/23 0730        Altered Skin Integrity 04/24/23 1136 Buttocks Moisture associated dermatitis Intact skin with non-blanchable redness of localized area   Date First Assessed/Time First Assessed: 04/24/23 1136   Altered Skin Integrity Present on Admission - Did Patient arrive to the hospital with altered skin?: yes  Location: (c) Buttocks  Is this injury device related?: No  Primary Wound Type: Moisture...   Dressing Appearance Open to air   Drainage Amount Scant   Drainage Characteristics/Odor Clear   Appearance Pink;Red   Tissue loss description Not applicable   Periwound Area Intact;Dry   Wound Edges Rolled/closed   Care Cleansed with:;Soap and water;Applied:;Skin Barrier        Altered Skin Integrity Left lower;lateral Leg   No Date First Assessed or Time First Assessed found.   Altered Skin Integrity Present on Admission - Did Patient arrive to the hospital with altered skin?: yes  Side: Left  Orientation: lower;lateral  Location: Leg   Dressing Appearance Dry;Intact;Clean   Drainage Amount None   Appearance Pink;Red;Dry   Tissue loss description Not applicable   Periwound Area Intact;Dry;Other (see comments)  (dry peeling skin)   Wound Edges Jagged;Irregular   Care Cleansed with:;Antimicrobial agent;Soap and  water;Applied:;Skin Barrier   Dressing Applied;Elastic bandage   Dressing Change Due 05/08/23        Altered Skin Integrity Right anterior;lower;lateral;medial;posterior Leg   No Date First Assessed or Time First Assessed found.   Altered Skin Integrity Present on Admission - Did Patient arrive to the hospital with altered skin?: yes  Side: Right  Orientation: anterior;lower;lateral;medial;posterior  Location: Leg   Dressing Appearance Dry;Clean;Moist drainage   Drainage Amount Scant   Drainage Characteristics/Odor Clear;Serous   Appearance Pink;Red;Dry   Periwound Area Intact;Dry;Pink;Redness   Wound Edges Rolled/closed   Care Cleansed with:;Antimicrobial agent;Applied:;Skin Barrier   Dressing Applied;Changed;Elastic bandage        No results for input(s): GLUCOSE, NA, K, CL, CO2, BUN, CREATININE, MG in the last 24 hours.    Invalid input(s):  CALCIUM        No results for input(s): WBC, RBC, HGB, HCT, PLT, MCV, MCH, MCHC in the last 24 hours.          Recent Labs   Lab 05/07/23  2032 05/08/23  0710 05/08/23  1118 05/08/23  1635 05/08/23  2047 05/09/23  0711   POCTGLUCOSE 192* 98 132* 98 163* 112*        Assessment and Plan:      Cellulitis BLE  Chronic lower extremity wounds  - completed Amoxicillin and Doxycycline x 4 days end date 4/30  - Bob supplements, zinc, multivitamin, ascorbic acid, continue vitamin-E  -  Dermatology appointment today, updated orders to reflect recommendations  - patient will need to follow-up with her previous wound care doctor, daughter to make appointment  - appearance improving    Stage 3b chronic kidney disease  - sCr baseline around 1.2  - improving, continue reduced Lasix 20 mg daily, avoid nephrotoxic agents, renally dose medications when appropriate.    Acute on chronic diastolic heart failure  - last echo from 10/07/2022 with EF of 55%  - monitor Is&Os and daily weights.    - Fluid restriction of 1.5 L, cardiac diet  -   - continue Lasix 20mg daily     Community  "acquired pneumonia of left lower lobe of lung  Productive cough  - completed Amoxicillin and Doxycycline x 4 days  - persisting, change Mucinex DM to liquid formula, CPT BID x3 days    Chronic a-fib  - [id number] Score: 4. HASBLED Score:  Anticoagulation not on anticoagulation due to watchman device, on asa only   -  BB on allergy list   - Can use CCB (PO dilt) if rate control needed in light of BB "allergy"     Microcytic anemia  - continue ferrous sulfate daily  - stable, continue to monitor twice weekly CBC, Transfuse if Hgb < 7 or symptomatic     Intertrigo  - severe, continue fluconazole 200 mg daily x9 days  - referral placed to Dermatology for patient to have further evaluation  - appearance improving      Presence of Watchman left atrial appendage closure device  - No need for anticoagulation      Venous insufficiency of both lower extremities  - continue ASA, pravastatin 40 mg daily     Prediabetes  - last A1c 6.0 on 01/26/2023  - cardiac diet  - Accu-Cheks AC/HS     History of TIA (transient ischemic attack)  - Continue ASA 81 mg daily, pravastatin     Pure hypercholesterolemia  - continue pravastatin 40 mg daily    Debility   - Continue with PT/OT for gait training and strengthening and restoration of ADL's   - Encourage mobility, OOB in chair, and early ambulation as appropriate  - Fall precautions   - Monitor for bowel and bladder dysfunction  - Monitor for and prevent skin breakdown and pressure ulcers  - Continue DVT prophylaxis with frequent ambulation              Anticipate disposition:  Home with home health      Follow-up needed during SNF stay-    Follow-up needed after discharge from SNF: PCP, care clinic    Future Appointments   Date Time Provider Department Center   5/15/2023  9:00 AM Cody Cox MD [facility] WOUND [facility]   6/15/2023 11:15 AM Lennie Louis DPM [facility] POD [address] [facility]   6/29/2023  8:30 AM Rachael Case MD [facility] IM [place]   7/12/2023 11:30 AM Brent MCCAIN" Adela Odell MD [facility] [place]         Suzy Aparicio NP  Department of Hospital Medicine   [facility]     DOS: 5/9/2023       Patient note was created using MModal Dictation.  Any errors in syntax or even information may not have been identified and edited on initial review prior to signing this note.

## 2023-05-09 NOTE — PLAN OF CARE
CHW served per facility [facility] to patient. Patient signed, copy was given and faxed to [facility]

## 2023-05-09 NOTE — PLAN OF CARE
Problem: Physical Therapy  Goal: Physical Therapy Goal  Description: Goals to be met by: 5/28/23     Patient will increase functional independence with mobility by performing:    . Supine to sit with Set-up [place]  . Sit to supine with Set-up [place]  . Rolling to Left and Right with Modified [place]  . Sit to stand transfer with Supervision - not met, progressing  . Bed to chair transfer with Supervision using Rolling Walker - not met, progressing  . Gait  x 150 feet with Stand-by Assistance using Rolling Walker - not met, progressing  . Wheelchair propulsion x 150 feet with Stand-by Assistance using bilateral upper extremities - not met, progressing  . Ascend/Descend 4 inch curb step with Contact Guard Assistance using Rolling Walker - not met, progressing  . Added 5/9/2023 Ascend/Descend 4 steps with B rails and SBA for safety  Outcome: Ongoing, Progressing

## 2023-05-10 LAB
POCT GLUCOSE: 103 MG/DL (ref 70–110)
POCT GLUCOSE: 107 MG/DL (ref 70–110)
POCT GLUCOSE: 127 MG/DL (ref 70–110)
POCT GLUCOSE: 91 MG/DL (ref 70–110)

## 2023-05-10 PROCEDURE — 97116 GAIT TRAINING THERAPY: CPT | Mod: HCNC

## 2023-05-10 PROCEDURE — 25000003 PHARM REV CODE 250: Mod: HCNC | Performed by: NURSE PRACTITIONER

## 2023-05-10 PROCEDURE — 97530 THERAPEUTIC ACTIVITIES: CPT | Mod: HCNC

## 2023-05-10 PROCEDURE — 97110 THERAPEUTIC EXERCISES: CPT | Mod: HCNC

## 2023-05-10 PROCEDURE — 25000003 PHARM REV CODE 250: Mod: HCNC | Performed by: HOSPITALIST

## 2023-05-10 PROCEDURE — 11000004 HC SNF PRIVATE: Mod: HCNC

## 2023-05-10 RX ADMIN — ACETAMINOPHEN 1000 MG: 500 TABLET ORAL at 09:05

## 2023-05-10 RX ADMIN — FERROUS SULFATE TAB 325 MG (65 MG ELEMENTAL FE) 1 EACH: 325 (65 FE) TAB at 09:05

## 2023-05-10 RX ADMIN — DICLOFENAC SODIUM 2 G: 10 GEL TOPICAL at 01:05

## 2023-05-10 RX ADMIN — THERA TABS 1 TABLET: TAB at 09:05

## 2023-05-10 RX ADMIN — FUROSEMIDE 20 MG: 20 TABLET ORAL at 09:05

## 2023-05-10 RX ADMIN — MICONAZOLE NITRATE: 20 OINTMENT TOPICAL at 09:05

## 2023-05-10 RX ADMIN — DICLOFENAC SODIUM 2 G: 10 GEL TOPICAL at 05:05

## 2023-05-10 RX ADMIN — SILVER SULFADIAZINE: 10 CREAM TOPICAL at 09:05

## 2023-05-10 RX ADMIN — DICLOFENAC SODIUM 2 G: 10 GEL TOPICAL at 09:05

## 2023-05-10 RX ADMIN — TRIAMCINOLONE ACETONIDE: 1 CREAM TOPICAL at 09:05

## 2023-05-10 RX ADMIN — HYDROCORTISONE: 1 CREAM TOPICAL at 09:05

## 2023-05-10 RX ADMIN — GUAIFENESIN AND DEXTROMETHORPHAN 10 ML: 100; 10 SYRUP ORAL at 08:05

## 2023-05-10 RX ADMIN — ZINC SULFATE 220 MG (50 MG) CAPSULE 220 MG: CAPSULE at 09:05

## 2023-05-10 RX ADMIN — ASPIRIN 81 MG: 81 TABLET, COATED ORAL at 09:05

## 2023-05-10 RX ADMIN — GUAIFENESIN AND DEXTROMETHORPHAN 10 ML: 100; 10 SYRUP ORAL at 09:05

## 2023-05-10 RX ADMIN — SENNOSIDES AND DOCUSATE SODIUM 1 TABLET: 50; 8.6 TABLET ORAL at 09:05

## 2023-05-10 RX ADMIN — PRAVASTATIN SODIUM 40 MG: 20 TABLET ORAL at 09:05

## 2023-05-10 RX ADMIN — GUAIFENESIN AND DEXTROMETHORPHAN 10 ML: 100; 10 SYRUP ORAL at 03:05

## 2023-05-10 RX ADMIN — Medication 400 UNITS: at 09:05

## 2023-05-10 RX ADMIN — FLUTICASONE PROPIONATE 100 MCG: 50 SPRAY, METERED NASAL at 09:05

## 2023-05-10 RX ADMIN — Medication 250 MG: at 09:05

## 2023-05-10 NOTE — PT/OT/SLP PROGRESS
"Occupational Therapy   Treatment    Name: Jenniffer Jimenez  MRN: [id number]  Admit Date: 4/27/2023  Admitting Diagnosis:  Acute on chronic diastolic heart failure    General Precautions: Standard, fall, hearing impaired   Orthopedic Precautions: N/A   Braces: N/A    Recommendations:     Discharge Recommendations:  home health OT  Level of Assistance Recommended at Discharge: 24 hours light assistance for ADL's and homemaking tasks  Discharge Equipment Recommendations: walker, rolling  Barriers to discharge:  None    Assessment:     Jenniffer Jimenez is a 89 y.o. female with a medical diagnosis of Acute on chronic diastolic heart failure.   Pt tolerated session well and without incident, but she continues to require assistance to perform self-care tasks and mobility.  She would continue to benefit from skilled OT services at SNF to maximize her gains in functional independence.  She presents with the following.  Performance deficits affecting function are weakness, impaired endurance, impaired self care skills, impaired functional mobility, gait instability, impaired balance, decreased upper extremity function, decreased lower extremity function, decreased ROM, impaired skin, edema.     Rehab Potential is good    Activity tolerance:  Good    Plan:     Patient to be seen 5 x/week to address the above listed problems via self-care/home management, therapeutic activities, therapeutic exercises    Plan of Care Expires: 05/19/23  Plan of Care Reviewed with: patient, daughter    Subjective   "I want to practice transferring to the bed with the wheelchair."  Communicated with: nursing prior to session.    Pain/Comfort:  Pain Rating 1: 0/10  Pain Rating Post-Intervention 1: 0/10    Patient's cultural, spiritual, Adventist conflicts given the current situation:  No    Objective:     Patient found up in chair in the hallway with Other (comments) (no lines attached) with her daughter present upon OT entry to room.    Bed " Mobility:    N/A due to pt sitting in w/c at beginning of session and in bedside chair at end of session    Functional Mobility/Transfers:  Patient completed Sit <> Stand Transfer from w/c with stand by assistance and contact guard assistance with rolling walker; pt performed STS t/f from EOB with CGA with no AD  Patient completed Wheelchair <> Bed Transfer using Stand Pivot technique with contact guard assistance with no assistive device  Patient completed Bed <> Bedside Chair using Step Transfer technique with contact guard assistance with rolling walker, requiring cueing for RW management during turns and for hand placement on arm of chair for safe descent  Functional Mobility: Pt propelled w/c ~105 ft with SBA using BUE and BLE.  She later ambulated ~180 ft from gym to her room and inside her room with CGA with RW and w/c follow.  She had no overt LOB.   Noted that pt had difficulty reaching w/c wheels with arm rests in the way.  Therapist lifted arm rests up for pt to perform with therapist next to her.  Educated pt not to perform on her own for safety.  She verbalized understanding.    Activities of Daily Living:  Pt already performed    AMPAC 6 Click ADL: 17    OT Exercises: UE exercises performed with 2 lb dowel to increase functional endurance and strength in order increase independence when performing self care tasks, functional ambulation, W/C propulsion, and functional standing activities.  She performed AAROM shoulder flex/ext and rows x 1 set of 15 reps each with (A) to her LUE.  Pt then performed AROM biceps curls x 2 sets of 20 reps.     Treatment & Education:  - Pt performed visual perception activity with fine motor component in standing to address her standing endurance that's required for daily activities, such as ADLs and functional transfers.  She stood 13 min and 15 sec with CGA with RW at rehab gym counter, requiring intermittent assistance and cueing to correctly match the small objects to  their appropriate slots.  Pt dropped one item on the floor.     Pt and her daughter edu on role of OT, POC, safety when performing self care tasks, benefit of performing OOB activity, and safety when performing functional transfers and mobility.    - Self care tasks completed-- as noted above      Patient left up in bedside chair with BLE elevated with call button in reach and her daughter present    GOALS:   Multidisciplinary Problems       Occupational Therapy Goals          Problem: Occupational Therapy    Goal Priority Disciplines Outcome Interventions   Occupational Therapy Goal     OT, PT/OT Ongoing, Progressing    Description: Goals to be met by: 5/19/23     Patient will increase functional independence with ADLs by performing:    UE Dressing with Stand-by Assistance.  LE Dressing with Stand-by Assistance and Assistive Devices as needed.  Grooming while standing at sink with Supervision.  Toileting from toilet with Supervision for hygiene and clothing management.   Bathing from  standing at sink with Stand-by Assistance.  Supine to sit with Supervision.  Step transfer with Supervision  Upper extremity exercise program with assistance as needed.                         Time Tracking:     OT Date of Treatment: 05/10/23  OT Start Time: 0947    OT Stop Time: 1028  OT Total Time (min): 41 min    Billable Minutes:Therapeutic Activity 26 min  Therapeutic Exercise 15 min    5/10/2023

## 2023-05-10 NOTE — PROGRESS NOTES
[facility]                                  Skilled Nursing Facility                   Progress Note     Admit Date: 4/27/2023  GILES 5/12/2023  Principal Problem:  Acute on chronic diastolic heart failure   HPI obtained from patient interview and chart review     Chief Complaint: Re-evaluation of medical treatment and therapy status    HPI:   Jenniffer Jimenez is a 89 y.o. female with PMHx of cataract, Craig, CKD, DM II, diabetic polyneuropathy, HTN, PAF, TIA presents to SNF following hospitalization for AFib RVR and presumed cellulitis to right lower extremity.     Interval history:  24 hr vital sign ranges listed below.  Wounds are healing well.  Patient denies shortness of breath, abdominal discomfort, nausea, or vomiting.  Patient reports an adequate appetite.  Patient denies dysuria.  Patient reports having regular bowel movements.  Patient progessing with PT/OT-Gait: amb with RW CGA ~ 150 ft total, 1 stop for toileting along the way. Continuing to follow and treat all acute and chronic conditions.    Past Medical History: Patient has a past medical history of Amblyopia of left eye (04/10/2013), Arthritis, Atherosclerosis of aorta, Cataract, Central retinal vein occlusion of left eye, CKD (chronic kidney disease) stage 3, GFR 30-59 ml/min, Diabetes mellitus, type 2, Diabetic polyneuropathy (01/06/2022), Essential (primary) hypertension, Exotropia of both eyes (02/06/2013), Hearing loss, History of resection of small bowel, Hypertensive retinopathy of both eyes, Hypoglycemia, Macular degeneration, OA (osteoarthritis) of shoulder, Osteoporosis, Paroxysmal atrial fibrillation, Posterior vitreous detachment of both eyes, Psychiatric problem, Rhinitis, and TIA (transient ischemic attack).    Past Surgical History: Patient has a past surgical history that includes Cardiac catheterization; Inner ear surgery; Sinus surgery; Tonsillectomy;   section, classic; Appendectomy; Strabismus surgery (13); Strabismus surgery (2014); Cataract extraction w/  intraocular lens implant (Bilateral); Hysterectomy; Oophorectomy; Joint replacement; Closure of left atrial appendage using device (N/A, 2020); and watchman surgery (N/A, 2020).    Social History: Patient reports that she quit smoking about 40 years ago. Her smoking use included cigarettes. She has never been exposed to tobacco smoke. She has never used smokeless tobacco. She reports that she does not currently use alcohol after a past usage of about 2.0 standard drinks per week. She reports that she does not use drugs.    Family History: family history includes Breast cancer in her maternal aunt; Diabetes in her brother and sister; Heart disease in her sister and sister; Hypertension in her father and mother; Liver disease in her sister; No Known Problems in her daughter, maternal grandfather, maternal grandmother, maternal uncle, paternal aunt, paternal grandfather, paternal grandmother, paternal uncle, son, son, and son.    Allergies: Patient is allergic to opioids - morphine analogues, tizanidine, tramadol, beta-blockers (beta-adrenergic blocking agts), morphine, opioids-meperidine and related, and ciprofloxacin.    ROS  Constitutional: Negative for fever   Eyes: Negative for blurred vision, double vision   Respiratory: Negative for shortness of breath.  +productive cough   Cardiovascular: Negative for chest pain, palpitations.  + leg swelling.   Gastrointestinal: Negative for abdominal pain, constipation, diarrhea, nausea, vomiting.   Genitourinary: Negative for dysuria, frequency   Musculoskeletal:  + generalized weakness.  + for back pain and myalgias.   Skin:  + for itching and rash.   Neurological: Negative for dizziness, headaches.   Psychiatric/Behavioral: Negative for depression. The patient is not nervous/anxious.      24 hour Vital Sign Range   Temp:  [96.4 °F (35.8  °C)-98.1 °F (36.7 °C)]   Pulse:  [66-83]   Resp:  [18]   BP: (124-153)/(54-63)   SpO2:  [95 %-97 %]     Current BMI: Body mass index is 26.89 kg/m².    PEx  Constitutional: Patient appears debilitated.  No distress noted  HENT:   Head: Normocephalic and atraumatic.   Eyes: Pupils are equal, round  Neck: Normal range of motion. Neck supple.   Cardiovascular: Normal rate, irregular rhythm and normal heart sounds.    Pulmonary/Chest: Effort normal and breath sounds are clear  Abdominal: Soft. Bowel sounds are normal.   Musculoskeletal: Normal range of motion.   Neurological: Alert and oriented to person, place, and time.   Psychiatric: Normal mood and affect. Behavior is normal.   Skin: Skin is warm and dry.       05/08/23 0730        Altered Skin Integrity 04/24/23 1136 Buttocks Moisture associated dermatitis Intact skin with non-blanchable redness of localized area   Date First Assessed/Time First Assessed: 04/24/23 1136   Altered Skin Integrity Present on Admission - Did Patient arrive to the hospital with altered skin?: yes  Location: (c) Buttocks  Is this injury device related?: No  Primary Wound Type: Moisture...   Dressing Appearance Open to air   Drainage Amount Scant   Drainage Characteristics/Odor Clear   Appearance Pink;Red   Tissue loss description Not applicable   Periwound Area Intact;Dry   Wound Edges Rolled/closed   Care Cleansed with:;Soap and water;Applied:;Skin Barrier        Altered Skin Integrity Left lower;lateral Leg   No Date First Assessed or Time First Assessed found.   Altered Skin Integrity Present on Admission - Did Patient arrive to the hospital with altered skin?: yes  Side: Left  Orientation: lower;lateral  Location: Leg   Dressing Appearance Dry;Intact;Clean   Drainage Amount None   Appearance Pink;Red;Dry   Tissue loss description Not applicable   Periwound Area Intact;Dry;Other (see comments)  (dry peeling skin)   Wound Edges Jagged;Irregular   Care Cleansed with:;Antimicrobial  agent;Soap and water;Applied:;Skin Barrier   Dressing Applied;Elastic bandage   Dressing Change Due 05/08/23        Altered Skin Integrity Right anterior;lower;lateral;medial;posterior Leg   No Date First Assessed or Time First Assessed found.   Altered Skin Integrity Present on Admission - Did Patient arrive to the hospital with altered skin?: yes  Side: Right  Orientation: anterior;lower;lateral;medial;posterior  Location: Leg   Dressing Appearance Dry;Clean;Moist drainage   Drainage Amount Scant   Drainage Characteristics/Odor Clear;Serous   Appearance Pink;Red;Dry   Periwound Area Intact;Dry;Pink;Redness   Wound Edges Rolled/closed   Care Cleansed with:;Antimicrobial agent;Applied:;Skin Barrier   Dressing Applied;Changed;Elastic bandage        No results for input(s): GLUCOSE, NA, K, CL, CO2, BUN, CREATININE, MG in the last 24 hours.    Invalid input(s):  CALCIUM        No results for input(s): WBC, RBC, HGB, HCT, PLT, MCV, MCH, MCHC in the last 24 hours.          Recent Labs   Lab 05/08/23  2047 05/09/23  0711 05/09/23  1139 05/09/23  1624 05/09/23  2041 05/10/23  0711   POCTGLUCOSE 163* 112* 123* 110 192* 91        Assessment and Plan:      Cellulitis BLE  Chronic lower extremity wounds  - completed Amoxicillin and Doxycycline x 4 days end date 4/30  - Bob supplements, zinc, multivitamin, ascorbic acid, continue vitamin-E  -  Dermatology appointment today, updated orders to reflect recommendations  - patient will need to follow-up with her previous wound care doctor, daughter to make appointment  - appearance improving    Stage 3b chronic kidney disease  - sCr baseline around 1.2  - improving, continue reduced Lasix 20 mg daily, avoid nephrotoxic agents, renally dose medications when appropriate.    Acute on chronic diastolic heart failure  - last echo from 10/07/2022 with EF of 55%  - monitor Is&Os and daily weights.    - Fluid restriction of 1.5 L, cardiac diet  -   - continue Lasix 20mg daily  "    Community acquired pneumonia of left lower lobe of lung  Productive cough  - completed Amoxicillin and Doxycycline x 4 days  - persisting, change Mucinex DM to liquid formula, CPT BID x3 days    Chronic a-fib  - UUTUR2UCRe Score: 4. HASBLED Score:  Anticoagulation not on anticoagulation due to watchman device, on asa only   -  BB on allergy list   - Can use CCB (PO dilt) if rate control needed in light of BB "allergy"     Microcytic anemia  - continue ferrous sulfate daily  - stable, continue to monitor twice weekly CBC, Transfuse if Hgb < 7 or symptomatic     Intertrigo  - severe, continue fluconazole 200 mg daily x9 days  - referral placed to Dermatology for patient to have further evaluation  - appearance improving      Presence of Watchman left atrial appendage closure device  - No need for anticoagulation      Venous insufficiency of both lower extremities  - continue ASA, pravastatin 40 mg daily     Prediabetes  - last A1c 6.0 on 01/26/2023  - cardiac diet  - Accu-Cheks AC/HS     History of TIA (transient ischemic attack)  - Continue ASA 81 mg daily, pravastatin     Pure hypercholesterolemia  - continue pravastatin 40 mg daily    Debility   - Continue with PT/OT for gait training and strengthening and restoration of ADL's   - Encourage mobility, OOB in chair, and early ambulation as appropriate  - Fall precautions   - Monitor for bowel and bladder dysfunction  - Monitor for and prevent skin breakdown and pressure ulcers  - Continue DVT prophylaxis with frequent ambulation              Anticipate disposition:  Home with home health      Follow-up needed during SNF stay-    Follow-up needed after discharge from SNF: PCP, care clinic    Future Appointments   Date Time Provider Department Center   5/15/2023  9:00 AM Cody Cox MD [facility] WOUND [facility]   6/15/2023 11:15 AM Lennie Louis DPM [facility] POD [address] [facility]   6/29/2023  8:30 AM Rachael Case MD [facility] IM [place]   7/12/2023 11:30 AM " Brent Chapman Jr., MD [facility] CARDIO [place]         Suzy Aparicio NP  Department of [facility] Medicine   [facility]     DOS: 5/10/2023       Patient note was created using MModal Dictation.  Any errors in syntax or even information may not have been identified and edited on initial review prior to signing this note.

## 2023-05-10 NOTE — PLAN OF CARE
[facility] [place] [facility]      HOME HEALTH ORDERS  FACE TO FACE ENCOUNTER    Patient Name: Jenniffer Jimenez  YOB: 1932    PCP: Rubi Young DO   PCP Address: [address] / [place] [address]  PCP Phone Number: [phone]  PCP Fax: [phone]    Encounter Date: 4/27/23    Admit to Home Health    Diagnoses:  Active Hospital Problems    Diagnosis  POA    *Acute on chronic diastolic heart failure [I50.33]  Yes     TTE (10/7/2022):    The left ventricle is normal in size with concentric remodeling and normal systolic function.  The estimated ejection fraction is 55-60%.  Grade III left ventricular diastolic dysfunction.  Mild mitral regurgitation.  Normal right ventricular size with mildly reduced right ventricular systolic function.  Severe tricuspid regurgitation.  The estimated PA systolic pressure is 52 mmHg. PASP may be under-estimated due to TR severity.  Elevated central venous pressure (15 mmHg).  There is pulmonary hypertension.  Severe left atrial enlargement.           Community acquired pneumonia of left lower lobe of lung [J18.9]  Yes    Heart failure with preserved ejection fraction [I50.30]  Yes    Microcytic anemia [D50.9]  Yes    Polyneuropathy in diseases classified elsewhere [G63]  Yes    Pulmonary hypertension [I27.20]  Yes    Multiple open wounds of right lower extremity [S81.801A]  Yes    Diabetic polyneuropathy [E11.42]  Yes    Presence of Watchman left atrial appendage closure device [Z95.818]  Yes     Chronic    Venous insufficiency of both lower extremities [I87.2]  Yes    History of TIA (transient ischemic attack) [Z86.73]  Not Applicable    Stage 3b chronic kidney disease [N18.32]  Yes    Chronic a-fib [I48.20]  Yes    Meniere's disease [H81.09]  Yes    Debility [R53.81]  Yes    Primary hypertension [I10]  Yes     Chronic    Pure hypercholesterolemia [E78.00]  Yes     Chronic      Resolved Hospital Problems   No resolved problems to display.  "      Follow Up Appointments:  Future Appointments   Date Time Provider Department Center   5/15/2023  9:00 AM Cody Cox MD [facility] WOUND [facility]   6/15/2023 11:15 AM Lennie Louis DPM [facility] POD [place] [facility]   6/29/2023  8:30 AM Rachael Case MD [facility] IM [place]   7/12/2023 11:30 AM Brent Chapman Jr., MD [facility] CARDIO [place]       Allergies:  Review of patient's allergies indicates:   Allergen Reactions    Opioids - morphine analogues Other (See Comments)     Bowel issues; bowel obstruction    Tizanidine Other (See Comments)     "Lips were numb,  Almost passed out."    Tramadol Hallucinations    Beta-blockers (beta-adrenergic blocking agts) Other (See Comments)     Can not go on beta blockers for long period of time - due to taking allergy injections    Morphine     Opioids-meperidine and related     Ciprofloxacin Rash       Medications: Review discharge medications with patient and family and provide education.      I have seen and examined this patient within the last 30 days. My clinical findings that support the need for the home health skilled services and home bound status are the following:no   Weakness/numbness causing balance and gait disturbance due to Infection making it taxing to leave home.     Referrals/ Consults  Physical Therapy to evaluate and treat. Evaluate for home safety and equipment needs; Establish/upgrade home exercise program. Perform / instruct on therapeutic exercises, gait training, transfer training, and Range of Motion.  Occupational Therapy to evaluate and treat. Evaluate home environment for safety and equipment needs. Perform/Instruct on transfers, ADL training, ROM, and therapeutic exercises.   to evaluate for community resources/long-range planning.  Aide to provide assistance with personal care, ADLs, and vital signs.    Activities:   activity as tolerated    Nursing:   Agency to admit patient within 24 hours of hospital discharge unless " "specified on physician order or at patient request    SN to complete comprehensive assessment including routine vital signs. Instruct on disease process and s/s of complications to report to MD. Review/verify medication list sent home with the patient at time of discharge  and instruct patient/caregiver as needed. Frequency may be adjusted depending on start of care date.     Skilled nurse to perform up to 3 visits PRN for symptoms related to diagnosis    Notify MD if SBP > 160 or < 90; DBP > 90 or < 50; HR > 120 or < 50; Temp > 101; O2 < 88%    Ok to schedule additional visits based on staff availability and patient request on consecutive days within the home health episode.    Miscellaneous   Diabetic Care:   SN to perform and educate Diabetic management with blood glucose monitoring:, Fingerstick blood sugar AC and HS, and Report CBG < 60 or > 350 to physician.    Home Health Aide:  Nursing Three times weekly, Physical Therapy Three times weekly, Medical Social Work Three times weekly, and Home Health Aide Three times weekly    Wound Care Orders    Bilateral lower extremities-- cleanse with Vashe wound solution, Triamcinolone Ointment applied as ordered, Right shin/calf- apply surpa-absorbant dressing (mextra/Euxdry) then  tubigrip F applied from toes to knees for light compression BID  Continue pressure prevention measures    Place InterDry cloths flat to the skin folds of abdominal pannus and groin area- change q 5 days- may rinse cloths, allow to dry then replace- use briefs/underwear to hold cloths in place with at least 2" extending from skin folds.      I certify that this patient is confined to her home and needs intermittent skilled nursing care, physical therapy, and occupational therapy.            "

## 2023-05-10 NOTE — PT/OT/SLP PROGRESS
Physical Therapy Treatment    Patient Name:  Jenniffer Jimenez   MRN:  [id number]  Admit Date: 4/27/2023  Admitting Diagnosis: Acute on chronic diastolic heart failure  Recent Surgeries: N/A    General Precautions: Standard, fall, hearing impaired  Orthopedic Precautions: N/A  Braces: N/A    Recommendations:     Discharge Recommendations: home health PT  Level of Assistance Recommended at Discharge: 24 hours light assistance  Discharge Equipment Recommendations: walker, rolling  Barriers to discharge: Decreased caregiver support (fall risk)    Assessment:     Jenniffer Jimenez is a 89 y.o. female admitted with a medical diagnosis of Acute on chronic diastolic heart failure. Patient agreeable to PT treatment this AM. Patient requesting use of LE ergometer at start of session prior to standing mobility; increased duration also requested. Patient tolerated increased time on LE ergometer without adverse reaction or rest break. Patient with limited tolerance for ambulation training with decreased gait distance traveled this session. Patient with posterior lean and LOB with each gait trial requiring Deirdre to recover and a seated rest break in W/C. Patient educated on safety during functional mobility for fall prevention. Patient will benefit from continued SNF rehabilitation services to address deficits as well as progress mobility towards increased functional independence for safe transition to home environment at time of discharge.     Performance deficits affecting function: weakness, impaired endurance, impaired self care skills, impaired functional mobility, gait instability, impaired balance, decreased lower extremity function, decreased safety awareness, pain, edema.    Rehab Potential is good    Activity Tolerance: Fair    Plan:     Patient to be seen 5 x/week to address the above listed problems via gait training, therapeutic activities, therapeutic exercises, neuromuscular re-education, wheelchair  "management/training    Plan of Care Expires: 05/28/23  Plan of Care Reviewed with: patient    Subjective     "I think I did too much earlier today. I was trying to push my wheelchair with my feet this morning; it might have been too much."     Pain/Comfort:  Pain Rating 1:  (Pain level not quantified. Mild pain reported.)  Location - Side 1: Right  Location - Orientation 1: generalized  Location 1:  (lower extremity/foot)  Pain Addressed 1: Reposition, Distraction, Cessation of Activity  Pain Rating Post-Intervention 1:  (Pain level not quantified. Mild pain reported.)    Patient's cultural, spiritual, Druze conflicts given the current situation:  yes    Objective:     Communicated with nursing staff prior to session.  Patient found  seated in bedside chair  with  (no active lines) upon PT entry to room.     Therapeutic Activities and Exercises:   LE ergometer x 20 minutes at minimal resistance for LE strengthening, ROM, and endurance; no rest break required. PT with check in every 4-5 minutes (supervising continuously); patient reports doing well and no need for rest.    Functional Mobility:  Transfers:     Sit to Stand:  Deirdre from bedside chair in room with no AD and CGA/SBA from W/C.  Bed to Chair: contact guard assistance with  no AD  using  Stand Pivot  Gait: Patient ambulated 63 feet, 29 feet and 49 feet using RW with Deirdre. Patient with posterior trunk lean and LOB with each gait trial requiring seated rest break for safety. Patient ambulating with long step length with narrow ROSETTA; cues to increase ROSETTA and decrease step length for increased stability/balance. Seated rest break following each gait trial.      AM-PAC 6 CLICK MOBILITY  17    Patient left up in chair with call button in reach and nursing staff notified.    GOALS:   Multidisciplinary Problems       Physical Therapy Goals          Problem: Physical Therapy    Goal Priority Disciplines Outcome Goal Variances Interventions   Physical Therapy Goal "     PT, PT/OT Ongoing, Progressing     Description: Goals to be met by: 5/28/23     Patient will increase functional independence with mobility by performing:    . Supine to sit with Set-up [place]  . Sit to supine with Set-up [place]  . Rolling to Left and Right with Modified [place]  . Sit to stand transfer with Supervision - not met, progressing  . Bed to chair transfer with Supervision using Rolling Walker - not met, progressing  . Gait  x 150 feet with Stand-by Assistance using Rolling Walker - not met, progressing  . Wheelchair propulsion x 150 feet with Stand-by Assistance using bilateral upper extremities - not met, progressing  . Ascend/Descend 4 inch curb step with Contact Guard Assistance using Rolling Walker - not met, progressing  . Added 5/9/2023 Ascend/Descend 4 steps with B rails and SBA for safety                       Time Tracking:     PT Received On: 05/10/23  PT Start Time: 1058  PT Stop Time: 1140  PT Total Time (min): 42 min (9 min Individual/33 min Concurrent)    Billable Minutes: Gait Training 14, Therapeutic Activity 8, and Therapeutic Exercise 20    Treatment Type: Treatment  PT/PTA: PT     Number of PTA visits since last PT visit: 0     05/10/2023

## 2023-05-10 NOTE — PLAN OF CARE
Goals remain appropriate. Continue with POC.     Problem: Physical Therapy  Goal: Physical Therapy Goal  Description: Goals to be met by: 5/28/23     Patient will increase functional independence with mobility by performing:    . Supine to sit with Set-up [place]  . Sit to supine with Set-up [place]  . Rolling to Left and Right with Modified [place]  . Sit to stand transfer with Supervision - not met, progressing  . Bed to chair transfer with Supervision using Rolling Walker - not met, progressing  . Gait  x 150 feet with Stand-by Assistance using Rolling Walker - not met, progressing  . Wheelchair propulsion x 150 feet with Stand-by Assistance using bilateral upper extremities - not met, progressing  . Ascend/Descend 4 inch curb step with Contact Guard Assistance using Rolling Walker - not met, progressing  . Added 5/9/2023 Ascend/Descend 4 steps with B rails and SBA for safety  Outcome: Ongoing, Progressing   5/10/2023

## 2023-05-10 NOTE — PLAN OF CARE
Problem: Adult Inpatient Plan of Care  Goal: Plan of Care Review  Outcome: Ongoing, Progressing  Goal: Patient-Specific Goal (Individualized)  Outcome: Ongoing, Progressing     Problem: Diabetes Comorbidity  Goal: Blood Glucose Level Within Targeted Range  Outcome: Ongoing, Progressing     Problem: Fluid Imbalance (Pneumonia)  Goal: Fluid Balance  Outcome: Ongoing, Progressing     Problem: Infection (Pneumonia)  Goal: Resolution of Infection Signs and Symptoms  Outcome: Ongoing, Progressing     Problem: Impaired Wound Healing  Goal: Optimal Wound Healing  Outcome: Ongoing, Progressing     Problem: Skin Injury Risk Increased  Goal: Skin Health and Integrity  Outcome: Ongoing, Progressing     Problem: Fall Injury Risk  Goal: Absence of Fall and Fall-Related Injury  Outcome: Ongoing, Progressing

## 2023-05-11 ENCOUNTER — TELEPHONE (OUTPATIENT)
Dept: INTERNAL MEDICINE | Facility: CLINIC | Age: 88
End: 2023-05-11
Payer: MEDICARE

## 2023-05-11 LAB
ANION GAP SERPL CALC-SCNC: 7 MMOL/L (ref 8–16)
BASOPHILS # BLD AUTO: 0.03 K/UL (ref 0–0.2)
BASOPHILS NFR BLD: 0.7 % (ref 0–1.9)
BUN SERPL-MCNC: 30 MG/DL (ref 10–30)
CALCIUM SERPL-MCNC: 8.9 MG/DL (ref 8.7–10.5)
CHLORIDE SERPL-SCNC: 107 MMOL/L (ref 95–110)
CO2 SERPL-SCNC: 24 MMOL/L (ref 23–29)
CREAT SERPL-MCNC: 0.8 MG/DL (ref 0.5–1.4)
DIFFERENTIAL METHOD: ABNORMAL
EOSINOPHIL # BLD AUTO: 0.1 K/UL (ref 0–0.5)
EOSINOPHIL NFR BLD: 3.4 % (ref 0–8)
ERYTHROCYTE [DISTWIDTH] IN BLOOD BY AUTOMATED COUNT: 20.2 % (ref 11.5–14.5)
EST. GFR  (NO RACE VARIABLE): >60 ML/MIN/1.73 M^2
GLUCOSE SERPL-MCNC: 107 MG/DL (ref 70–110)
HCT VFR BLD AUTO: 25.5 % (ref 37–48.5)
HGB BLD-MCNC: 8.2 G/DL (ref 12–16)
IMM GRANULOCYTES # BLD AUTO: 0.03 K/UL (ref 0–0.04)
IMM GRANULOCYTES NFR BLD AUTO: 0.7 % (ref 0–0.5)
LYMPHOCYTES # BLD AUTO: 0.8 K/UL (ref 1–4.8)
LYMPHOCYTES NFR BLD: 20 % (ref 18–48)
MAGNESIUM SERPL-MCNC: 2.1 MG/DL (ref 1.6–2.6)
MCH RBC QN AUTO: 27.7 PG (ref 27–31)
MCHC RBC AUTO-ENTMCNC: 32.2 G/DL (ref 32–36)
MCV RBC AUTO: 86 FL (ref 82–98)
MONOCYTES # BLD AUTO: 0.5 K/UL (ref 0.3–1)
MONOCYTES NFR BLD: 11.1 % (ref 4–15)
NEUTROPHILS # BLD AUTO: 2.7 K/UL (ref 1.8–7.7)
NEUTROPHILS NFR BLD: 64.1 % (ref 38–73)
NRBC BLD-RTO: 0 /100 WBC
PHOSPHATE SERPL-MCNC: 4 MG/DL (ref 2.7–4.5)
PLATELET # BLD AUTO: 259 K/UL (ref 150–450)
PMV BLD AUTO: 10.8 FL (ref 9.2–12.9)
POCT GLUCOSE: 109 MG/DL (ref 70–110)
POCT GLUCOSE: 124 MG/DL (ref 70–110)
POCT GLUCOSE: 137 MG/DL (ref 70–110)
POCT GLUCOSE: 98 MG/DL (ref 70–110)
POTASSIUM SERPL-SCNC: 4.5 MMOL/L (ref 3.5–5.1)
RBC # BLD AUTO: 2.96 M/UL (ref 4–5.4)
SODIUM SERPL-SCNC: 138 MMOL/L (ref 136–145)
WBC # BLD AUTO: 4.16 K/UL (ref 3.9–12.7)

## 2023-05-11 PROCEDURE — 97110 THERAPEUTIC EXERCISES: CPT | Mod: HCNC

## 2023-05-11 PROCEDURE — 25000003 PHARM REV CODE 250: Mod: HCNC | Performed by: NURSE PRACTITIONER

## 2023-05-11 PROCEDURE — 97530 THERAPEUTIC ACTIVITIES: CPT | Mod: HCNC

## 2023-05-11 PROCEDURE — 11000004 HC SNF PRIVATE: Mod: HCNC

## 2023-05-11 PROCEDURE — 36415 COLL VENOUS BLD VENIPUNCTURE: CPT | Mod: HCNC | Performed by: HOSPITALIST

## 2023-05-11 PROCEDURE — 83735 ASSAY OF MAGNESIUM: CPT | Mod: HCNC | Performed by: HOSPITALIST

## 2023-05-11 PROCEDURE — 97116 GAIT TRAINING THERAPY: CPT | Mod: HCNC

## 2023-05-11 PROCEDURE — 84100 ASSAY OF PHOSPHORUS: CPT | Mod: HCNC | Performed by: HOSPITALIST

## 2023-05-11 PROCEDURE — 80048 BASIC METABOLIC PNL TOTAL CA: CPT | Mod: HCNC | Performed by: HOSPITALIST

## 2023-05-11 PROCEDURE — 85025 COMPLETE CBC W/AUTO DIFF WBC: CPT | Mod: HCNC | Performed by: HOSPITALIST

## 2023-05-11 PROCEDURE — 97535 SELF CARE MNGMENT TRAINING: CPT | Mod: HCNC,CO

## 2023-05-11 PROCEDURE — 25000003 PHARM REV CODE 250: Mod: HCNC | Performed by: HOSPITALIST

## 2023-05-11 RX ORDER — HYDROCORTISONE 1 %
CREAM (GRAM) TOPICAL 2 TIMES DAILY
Refills: 0 | COMMUNITY
Start: 2023-05-11

## 2023-05-11 RX ORDER — ZINC SULFATE 50(220)MG
220 CAPSULE ORAL DAILY
Qty: 18 CAPSULE | Refills: 0 | OUTPATIENT
Start: 2023-05-12 | End: 2023-05-30

## 2023-05-11 RX ORDER — GUAIFENESIN/DEXTROMETHORPHAN 100-10MG/5
5 SYRUP ORAL EVERY 4 HOURS PRN
Refills: 0 | COMMUNITY
Start: 2023-05-11 | End: 2023-05-21

## 2023-05-11 RX ORDER — FUROSEMIDE 20 MG/1
20 TABLET ORAL DAILY
Qty: 30 TABLET | Refills: 3 | OUTPATIENT
Start: 2023-05-12

## 2023-05-11 RX ORDER — ASCORBIC ACID 250 MG
250 TABLET ORAL DAILY
COMMUNITY
Start: 2023-05-12

## 2023-05-11 RX ORDER — SILVER SULFADIAZINE 10 G/1000G
CREAM TOPICAL
Qty: 50 G | Refills: 2 | OUTPATIENT
Start: 2023-05-11

## 2023-05-11 RX ADMIN — DICLOFENAC SODIUM 2 G: 10 GEL TOPICAL at 09:05

## 2023-05-11 RX ADMIN — FERROUS SULFATE TAB 325 MG (65 MG ELEMENTAL FE) 1 EACH: 325 (65 FE) TAB at 09:05

## 2023-05-11 RX ADMIN — THERA TABS 1 TABLET: TAB at 09:05

## 2023-05-11 RX ADMIN — MICONAZOLE NITRATE: 20 OINTMENT TOPICAL at 09:05

## 2023-05-11 RX ADMIN — ASPIRIN 81 MG: 81 TABLET, COATED ORAL at 09:05

## 2023-05-11 RX ADMIN — ACETAMINOPHEN 1000 MG: 500 TABLET ORAL at 09:05

## 2023-05-11 RX ADMIN — ZINC SULFATE 220 MG (50 MG) CAPSULE 220 MG: CAPSULE at 09:05

## 2023-05-11 RX ADMIN — SENNOSIDES AND DOCUSATE SODIUM 1 TABLET: 50; 8.6 TABLET ORAL at 09:05

## 2023-05-11 RX ADMIN — FUROSEMIDE 20 MG: 20 TABLET ORAL at 09:05

## 2023-05-11 RX ADMIN — Medication 400 UNITS: at 09:05

## 2023-05-11 RX ADMIN — SILVER SULFADIAZINE: 10 CREAM TOPICAL at 09:05

## 2023-05-11 RX ADMIN — DICLOFENAC SODIUM 2 G: 10 GEL TOPICAL at 02:05

## 2023-05-11 RX ADMIN — PRAVASTATIN SODIUM 40 MG: 20 TABLET ORAL at 09:05

## 2023-05-11 RX ADMIN — GUAIFENESIN AND DEXTROMETHORPHAN 10 ML: 100; 10 SYRUP ORAL at 08:05

## 2023-05-11 RX ADMIN — DICLOFENAC SODIUM 2 G: 10 GEL TOPICAL at 04:05

## 2023-05-11 RX ADMIN — Medication 250 MG: at 09:05

## 2023-05-11 RX ADMIN — TRIAMCINOLONE ACETONIDE: 1 CREAM TOPICAL at 09:05

## 2023-05-11 RX ADMIN — HYDROCORTISONE: 1 CREAM TOPICAL at 09:05

## 2023-05-11 RX ADMIN — GUAIFENESIN AND DEXTROMETHORPHAN 10 ML: 100; 10 SYRUP ORAL at 09:05

## 2023-05-11 RX ADMIN — FLUTICASONE PROPIONATE 100 MCG: 50 SPRAY, METERED NASAL at 09:05

## 2023-05-11 RX ADMIN — GUAIFENESIN AND DEXTROMETHORPHAN 10 ML: 100; 10 SYRUP ORAL at 02:05

## 2023-05-11 NOTE — PT/OT/SLP PROGRESS
"Physical Therapy Treatment/Discharge Note    Patient Name:  Jenniffer Jimenez   MRN:  [id number]  Admit Date: 4/27/2023  Admitting Diagnosis: Acute on chronic diastolic heart failure  Recent Surgeries: N/A    General Precautions: Standard, fall, hearing impaired  Orthopedic Precautions: N/A  Braces: N/A    Recommendations:     Discharge Recommendations: home health PT  Level of Assistance Recommended at Discharge: 24 hours light assistance  Discharge Equipment Recommendations: walker, rolling  Barriers to discharge: Decreased caregiver support (fall risk)    Assessment:     Jenniffer Jimenez is a 89 y.o. female admitted with a medical diagnosis of Acute on chronic diastolic heart failure. Patient agreeable to PT treatment this AM. Patient demonstrating improved quality of gait without LOB and with increased distance traveled this session. Patient able to ambulate 179 feet x 2 trials with CGA using RW. Patient requiring SBA for functional transfers, Min/CGA for curb step and stair training (4 steps w/B HR) and Supervision/Mod I for bed mobility. Patient tolerated LE ergometer without adverse reaction at end of session. Patient scheduled for D/C on 5/12/23 with recommendation for Home Health PT/OT to continue with progression of mobility toward functional independence and decreased fall risk.    Performance deficits affecting function: weakness, impaired endurance, impaired self care skills, impaired functional mobility, gait instability, impaired balance, decreased lower extremity function, decreased safety awareness, pain, edema.    Rehab Potential is good    Activity Tolerance: Good    Plan:     Patient to be seen 5 x/week to address the above listed problems via gait training, therapeutic activities, therapeutic exercises, neuromuscular re-education, wheelchair management/training    Plan of Care Expires: 05/28/23  Plan of Care Reviewed with: patient, daughter    Subjective     "I am feeling good today." "     Pain/Comfort:  Pain Rating 1:  (Pain level not quantified.)  Location - Side 1: Right  Location - Orientation 1: generalized  Location 1: hip  Pain Addressed 1: Reposition, Cessation of Activity, Distraction  Pain Rating Post-Intervention 1:  (Pain level not quantified.)    Patient's cultural, spiritual, Zoroastrianism conflicts given the current situation:  yes    Objective:     Communicated with nursing staff prior to session.  Patient found  seated in bedside chair with BLE elevated  with  (No active lines) upon PT entry to room. Daughter present.     Therapeutic Activities and Exercises:   LE ergometer x 10 minutes at minimal resistance for LE strengthening, ROM, and endurance; no rest break required.     Functional Mobility:  Bed Mobility:     Rolling Left:  modified independence and on level mat without railings  Rolling Right: modified independence and on level mat without railings  Supine to Sit: supervision and on level mat without railings  Sit to Supine: supervision and on level mat without railings  Transfers:     Sit to Stand:  stand by assistance with rolling walker  Chair to Mat/Bedside Chair to Wheelchair: stand by assistance with no AD  using Stand Pivot and Squat Pivot  Gait: Patient ambulated 179 feet x 2 trials using RW with CGA. Seated rest break between trials. PT with W/C in tow. Patient initially with downward gaze; cues to look up and forward in the direction of travel. Patient initially with long strides and narrow ROSETTA; cues to decreased step length and increased ROSETTA for improved stability/balance. No LOB.   Stairs:  Pt ascended/descended 4 stair(s) with No Assistive Device with bilateral handrails with Contact Guard Assistance and Minimal Assistance. Patient ascended/descended 4 inch curb step x 2 consecutive trials using RW with Min/CGA  Wheelchair Propulsion:  Pt propelled Standard wheelchair x 70 feet on Level tile with  Bilateral upper extremity with Stand-by Assistance.     AM-PAC 6  CLICK MOBILITY  18    Patient left up in chair with  daughter present.    GOALS:   Multidisciplinary Problems       Physical Therapy Goals          Problem: Physical Therapy    Goal Priority Disciplines Outcome Goal Variances Interventions   Physical Therapy Goal     PT, PT/OT Adequate for Care Transition     Description: Goals to be met by: 5/28/23     Patient will increase functional independence with mobility by performing:    . Supine to sit with Set-up [place] - MET  . Sit to supine with Set-up [place] - MET  . Rolling to Left and Right with Modified [place] - MET  . Sit to stand transfer with Supervision - not met, SBA for safety  . Bed to chair transfer with Supervision using Rolling Walker - not met, SBA for safety  . Gait  x 150 feet with Stand-by Assistance using Rolling Walker - not met, CGA for safety  . Wheelchair propulsion x 150 feet with Stand-by Assistance using bilateral upper extremities - not met, limited distance secondary to UE fatigue  . Ascend/Descend 4 inch curb step with Contact Guard Assistance using Rolling Walker - not met, Min/CGA for safety  . Added 5/9/2023 Ascend/Descend 4 steps with B rails and SBA for safety - not met, Deirdre/CGA for safety                       Time Tracking:     PT Received On: 05/11/23  PT Start Time: 1102  PT Stop Time: 1142  PT Total Time (min): 40 min    Billable Minutes: Gait Training 20, Therapeutic Activity 10, and Therapeutic Exercise 10    Treatment Type: Treatment  PT/PTA: PT     Number of PTA visits since last PT visit: 0     05/11/2023

## 2023-05-11 NOTE — PT/OT/SLP PROGRESS
"Occupational Therapy   Treatment/Discharge Summary    Name: Jenniffer Jimenez  MRN: [id number]  Admit Date: 4/27/2023  Admitting Diagnosis:  Acute on chronic diastolic heart failure    General Precautions: Standard, fall, hearing impaired   Orthopedic Precautions: N/A   Braces: N/A    Recommendations:     Discharge Recommendations:  home health OT  Level of Assistance Recommended at Discharge: 24 hours light assistance for ADL's and homemaking tasks  Discharge Equipment Recommendations: walker, rolling  Barriers to discharge:  None    Assessment:     Jenniffer Jimenez is a 89 y.o. female with a medical diagnosis of Acute on chronic diastolic heart failure .  She presents with performance deficits affecting function are weakness, impaired endurance, impaired self care skills, impaired functional mobility, gait instability, impaired balance, decreased upper extremity function, decreased lower extremity function, decreased ROM, impaired skin, edema.     Pt participated well during today's session. Pt tolerated tx session without incident and is making progress, however, continues to demonstrate deficits with self care skills, balance, functional mobility, UB strength and endurance. Pt will benefit from continued OT services to progress towards goals.     Rehab Potential is good    Activity tolerance:  Good    Plan:     Patient to be seen 5 x/week to address the above listed problems via self-care/home management, therapeutic activities, therapeutic exercises    Plan of Care Expires: 05/19/23  Plan of Care Reviewed with: patient, daughter    Subjective   "Can I take a shower?"  Communicated with: Octavia prior to session.  .    Pain/Comfort:  Pain Rating 1: 6/10  Location - Side 1: Right  Location - Orientation 1: generalized  Location 1: hip  Pain Addressed 1: Nurse notified, Distraction, Reposition  Pain Rating Post-Intervention 1: 6/10    Patient's cultural, spiritual, Faith conflicts given the current " situation:  yes    Objective:     Patient found HOB elevated with daughter present upon OT entry to room.    Bed Mobility:    Patient completed Rolling/Turning to Right with supervision  Patient completed Scooting/Bridging with supervision  Patient completed Supine to Sit with supervision     Functional Mobility/Transfers:  Patient completed Sit <> Stand Transfer with stand by assistance  with  rolling walker   Patient completed Bed <> Chair Transfer using Step Transfer technique with contact guard assistance with rolling walker  Patient completed Toilet Transfer Step Transfer technique with stand by assistance and contact guard assistance with  rolling walker  Patient completed  Shower Transfer Step Transfer technique with contact guard assistance with rolling walker  Functional Mobility: Pt ambulated 18 ft in room with SBA/CGA and  RW.     Activities of Daily Living:  Grooming: supervision to perform oral/facial hygiene seated at sink.   Bathing: minimum assistance to perform upper and lower body cleansing. (A) provided to wash back seated on commode.   Upper Body Dressing: stand by assistance to doff/don pullover gown and shirt. Required v/c to thread affected limb first.   Lower Body Dressing: moderate assistance to thread B LE into pants and brief. (A) provided in stance to manage over hips.   Toileting: supervision to perform nelida hygiene seated on raised commode.     [facility] 6 Click ADL: 17    Treatment & Education:  Pt and daughter educated on:  - role of OT  - level of assistance  - adaptive equipment  -  safety while performing functional transfers and self care tasks  - progress towards OT goals.     Patient left up in chair with call button in reach and daughter present    GOALS:   Multidisciplinary Problems       Occupational Therapy Goals          Problem: Occupational Therapy    Goal Priority Disciplines Outcome Interventions   Occupational Therapy Goal     OT, PT/OT Ongoing, Progressing    Description:  Goals to be met by: 5/19/23     Patient will increase functional independence with ADLs by performing:    UE Dressing with Stand-by Assistance.- MET  LE Dressing with Stand-by Assistance and Assistive Devices as needed.- NOT MET  Grooming while standing at sink with Supervision._ MET  Toileting from toilet with Supervision for hygiene and clothing management. - MET  Bathing from  standing at sink with Stand-by Assistance.- NOT MET  Supine to sit with Supervision.- MET  Step transfer with Supervision - MET  Upper extremity exercise program with assistance as needed.- MET                         Time Tracking:     OT Date of Treatment: 05/11/23  OT Start Time: 0825    OT Stop Time: 0905  OT Total Time (min): 40 min    Billable Minutes:Self Care/Home Management 40    5/11/2023

## 2023-05-11 NOTE — PLAN OF CARE
Problem: Adult Inpatient Plan of Care  Goal: Plan of Care Review  Outcome: Ongoing, Progressing  Goal: Patient-Specific Goal (Individualized)  Outcome: Ongoing, Progressing     Problem: Diabetes Comorbidity  Goal: Blood Glucose Level Within Targeted Range  Outcome: Ongoing, Progressing     Problem: Fluid Imbalance (Pneumonia)  Goal: Fluid Balance  Outcome: Ongoing, Progressing     Problem: Infection (Pneumonia)  Goal: Resolution of Infection Signs and Symptoms  Outcome: Ongoing, Progressing     Problem: Impaired Wound Healing  Goal: Optimal Wound Healing  Outcome: Ongoing, Progressing     Problem: Fall Injury Risk  Goal: Absence of Fall and Fall-Related Injury  Outcome: Ongoing, Progressing

## 2023-05-11 NOTE — PLAN OF CARE
D/C on 5/12/23    Problem: Physical Therapy  Goal: Physical Therapy Goal  Description: Goals to be met by: 5/28/23     Patient will increase functional independence with mobility by performing:    . Supine to sit with Set-up [place] - MET  . Sit to supine with Set-up [place] - MET  . Rolling to Left and Right with Modified [place] - MET  . Sit to stand transfer with Supervision - not met, SBA for safety  . Bed to chair transfer with Supervision using Rolling Walker - not met, SBA for safety  . Gait  x 150 feet with Stand-by Assistance using Rolling Walker - not met, CGA for safety  . Wheelchair propulsion x 150 feet with Stand-by Assistance using bilateral upper extremities - not met, limited distance secondary to UE fatigue  . Ascend/Descend 4 inch curb step with Contact Guard Assistance using Rolling Walker - not met, Min/CGA for safety  . Added 5/9/2023 Ascend/Descend 4 steps with B rails and SBA for safety - not met, Deirdre/CGA for safety  Outcome: Adequate for Care Transition   5/11/2023

## 2023-05-11 NOTE — TELEPHONE ENCOUNTER
I called the assistant living  for Mar[facility] and she was gone.  Asked that they would live a message to let her know that we are unable to get her in with DR Young . Asked if they would talk Mrs Luna to see if she is will to see one of our doctor in this practices.

## 2023-05-11 NOTE — TELEPHONE ENCOUNTER
----- Message from Alyssa Freedman sent at 5/11/2023  2:21 PM CDT -----  Contact: Luciana/[facility]/ [phone]  Caller is requesting an earlier appointment then we can schedule.  Caller is requesting a message be sent to the provider.  If this is for urgent care symptoms, did you offer other providers at this location, providers at other locations, or [facility]? (yes, no, n/a):  n/a  If this is for the patients physical, did you offer to schedule next available and put on wait list, or to see NP or PA for their physical?  (yes, no, n/a):  n/a  When is the next available appointment with their provider: 0  Reason for the appointment:  hosp f/u   Patient preference of timeframe to be scheduled:  7-10  Would the patient like a call back, or a response through their [facility] portal?:   phone  Comments:  unable to find an open appointment with pcp for hosp f/u please advise. Thank you

## 2023-05-11 NOTE — PROGRESS NOTES
[facility]                                  Skilled Nursing Facility                   Progress Note     Admit Date: 4/27/2023  GILES 5/12/2023  Principal Problem:  Acute on chronic diastolic heart failure   HPI obtained from patient interview and chart review     Chief Complaint: Re-evaluation of medical treatment and therapy status: Lab review    HPI:   Jenniffer Jimenez is a 89 y.o. female with PMHx of cataract, [place], CKD, DM II, diabetic polyneuropathy, HTN, PAF, TIA presents to SNF following hospitalization for AFib RVR and presumed cellulitis to right lower extremity.     Interval history:  All of today's labs reviewed and are listed below.  24 hr vital sign ranges listed below.  Wounds are healing well. . Patient denies shortness of breath, abdominal discomfort, nausea, or vomiting.  Patient reports an adequate appetite.  Patient denies dysuria.  Patient reports having regular bowel movements.  Patient progessing with PT/OT- Gait: Patient ambulated 63 feet, 29 feet and 49 feet using RW with Deirdre. Patient with posterior trunk lean and LOB with each gait trial requiring seated rest break for safety. Patient ambulating with long step length with narrow ROSETTA; cues to increase ROSETTA and decrease step length for increased stability/balance. Seated rest break following each gait trial.  . Continuing to follow and treat all acute and chronic conditions.      Past Medical History: Patient has a past medical history of Amblyopia of left eye (04/10/2013), Arthritis, Atherosclerosis of aorta, Cataract, Central retinal vein occlusion of left eye, CKD (chronic kidney disease) stage 3, GFR 30-59 ml/min, Diabetes mellitus, type 2, Diabetic polyneuropathy (01/06/2022), Essential (primary) hypertension, Exotropia of both eyes (02/06/2013), Hearing loss, History of resection of small bowel, Hypertensive retinopathy of both eyes, Hypoglycemia, Macular  degeneration, OA (osteoarthritis) of shoulder, Osteoporosis, Paroxysmal atrial fibrillation, Posterior vitreous detachment of both eyes, Psychiatric problem, Rhinitis, and TIA (transient ischemic attack).    Past Surgical History: Patient has a past surgical history that includes Cardiac catheterization; Inner ear surgery; Sinus surgery; Tonsillectomy;  section, classic; Appendectomy; Strabismus surgery (13); Strabismus surgery (2014); Cataract extraction w/  intraocular lens implant (Bilateral); Hysterectomy; Oophorectomy; Joint replacement; Closure of left atrial appendage using device (N/A, 2020); and watchman surgery (N/A, 2020).    Social History: Patient reports that she quit smoking about 40 years ago. Her smoking use included cigarettes. She has never been exposed to tobacco smoke. She has never used smokeless tobacco. She reports that she does not currently use alcohol after a past usage of about 2.0 standard drinks per week. She reports that she does not use drugs.    Family History: family history includes Breast cancer in her maternal aunt; Diabetes in her brother and sister; Heart disease in her sister and sister; Hypertension in her father and mother; Liver disease in her sister; No Known Problems in her daughter, maternal grandfather, maternal grandmother, maternal uncle, paternal aunt, paternal grandfather, paternal grandmother, paternal uncle, son, son, and son.    Allergies: Patient is allergic to opioids - morphine analogues, tizanidine, tramadol, beta-blockers (beta-adrenergic blocking agts), morphine, opioids-meperidine and related, and ciprofloxacin.    ROS  Constitutional: Negative for fever   Eyes: Negative for blurred vision, double vision   Respiratory: Negative for shortness of breath.  +productive cough   Cardiovascular: Negative for chest pain, palpitations.  + leg swelling.   Gastrointestinal: Negative for abdominal pain, constipation, diarrhea, nausea,  vomiting.   Genitourinary: Negative for dysuria, frequency   Musculoskeletal:  + generalized weakness.  + for back pain and myalgias.   Skin:  + for itching and rash.   Neurological: Negative for dizziness, headaches.   Psychiatric/Behavioral: Negative for depression. The patient is not nervous/anxious.      24 hour Vital Sign Range   Temp:  [98.2 °F (36.8 °C)-98.3 °F (36.8 °C)]   Pulse:  [82-85]   Resp:  [18]   BP: (132-138)/(60)   SpO2:  [96 %-98 %]     Current BMI: Body mass index is 26.85 kg/m².    PEx  Constitutional: Patient appears debilitated.  No distress noted  HENT:   Head: Normocephalic and atraumatic.   Eyes: Pupils are equal, round  Neck: Normal range of motion. Neck supple.   Cardiovascular: Normal rate, irregular rhythm and normal heart sounds.    Pulmonary/Chest: Effort normal and breath sounds are clear  Abdominal: Soft. Bowel sounds are normal.   Musculoskeletal: Normal range of motion.   Neurological: Alert and oriented to person, place, and time.   Psychiatric: Normal mood and affect. Behavior is normal.   Skin: Skin is warm and dry.       05/08/23 0730        Altered Skin Integrity 04/24/23 1136 Buttocks Moisture associated dermatitis Intact skin with non-blanchable redness of localized area   Date First Assessed/Time First Assessed: 04/24/23 1136   Altered Skin Integrity Present on Admission - Did Patient arrive to the hospital with altered skin?: yes  Location: (c) Buttocks  Is this injury device related?: No  Primary Wound Type: Moisture...   Dressing Appearance Open to air   Drainage Amount Scant   Drainage Characteristics/Odor Clear   Appearance Pink;Red   Tissue loss description Not applicable   Periwound Area Intact;Dry   Wound Edges Rolled/closed   Care Cleansed with:;Soap and water;Applied:;Skin Barrier        Altered Skin Integrity Left lower;lateral Leg   No Date First Assessed or Time First Assessed found.   Altered Skin Integrity Present on Admission - Did Patient arrive to the  hospital with altered skin?: yes  Side: Left  Orientation: lower;lateral  Location: Leg   Dressing Appearance Dry;Intact;Clean   Drainage Amount None   Appearance Pink;Red;Dry   Tissue loss description Not applicable   Periwound Area Intact;Dry;Other (see comments)  (dry peeling skin)   Wound Edges Jagged;Irregular   Care Cleansed with:;Antimicrobial agent;Soap and water;Applied:;Skin Barrier   Dressing Applied;Elastic bandage   Dressing Change Due 05/08/23        Altered Skin Integrity Right anterior;lower;lateral;medial;posterior Leg   No Date First Assessed or Time First Assessed found.   Altered Skin Integrity Present on Admission - Did Patient arrive to the hospital with altered skin?: yes  Side: Right  Orientation: anterior;lower;lateral;medial;posterior  Location: Leg   Dressing Appearance Dry;Clean;Moist drainage   Drainage Amount Scant   Drainage Characteristics/Odor Clear;Serous   Appearance Pink;Red;Dry   Periwound Area Intact;Dry;Pink;Redness   Wound Edges Rolled/closed   Care Cleansed with:;Antimicrobial agent;Applied:;Skin Barrier   Dressing Applied;Changed;Elastic bandage        Recent Labs   Lab 05/11/23  0402      K 4.5      CO2 24   BUN 30   CREATININE 0.8   MG 2.1           Recent Labs   Lab 05/11/23  0402   WBC 4.16   RBC 2.96*   HGB 8.2*   HCT 25.5*      MCV 86   MCH 27.7   MCHC 32.2             Recent Labs   Lab 05/09/23  2041 05/10/23  0711 05/10/23  1152 05/10/23  1633 05/10/23  1955 05/11/23  0740   POCTGLUCOSE 192* 91 107 127* 103 109        Assessment and Plan:      Cellulitis BLE  Chronic lower extremity wounds  - completed Amoxicillin and Doxycycline x 4 days end date 4/30  - Bob supplements, zinc, multivitamin, ascorbic acid, continue vitamin-E  -  Dermatology appointment today, updated orders to reflect recommendations  - patient will need to follow-up with her previous wound care doctor, daughter to make appointment  - appearance improving    Stage 3b chronic  "kidney disease  - sCr baseline around 1.2  - improving, continue reduced Lasix 20 mg daily, avoid nephrotoxic agents, renally dose medications when appropriate.    Acute on chronic diastolic heart failure  - last echo from 10/07/2022 with EF of 55%  - monitor Is&Os and daily weights.    - Fluid restriction of 1.5 L, cardiac diet  -   - stable, continue Lasix 20mg daily     Community acquired pneumonia of left lower lobe of lung  Productive cough  - completed Amoxicillin and Doxycycline x 4 days  - persisting, change Mucinex DM to liquid formula, CPT BID x3 days    Chronic a-fib  - [id number] Score: 4. HASBLED Score:  Anticoagulation not on anticoagulation due to watchman device, on asa only   -  BB on allergy list   - Can use CCB (PO dilt) if rate control needed in light of BB "allergy"     Microcytic anemia  - continue ferrous sulfate daily  - stable, continue to monitor twice weekly CBC, Transfuse if Hgb < 7 or symptomatic     Intertrigo  - severe, continue fluconazole 200 mg daily x9 days  - referral placed to Dermatology for patient to have further evaluation  - appearance improving      Presence of Watchman left atrial appendage closure device  - No need for anticoagulation      Venous insufficiency of both lower extremities  - continue ASA, pravastatin 40 mg daily     Prediabetes  - last A1c 6.0 on 01/26/2023  - cardiac diet  - Accu-Cheks AC/HS     History of TIA (transient ischemic attack)  - Continue ASA 81 mg daily, pravastatin     Pure hypercholesterolemia  - continue pravastatin 40 mg daily    Debility   - Continue with PT/OT for gait training and strengthening and restoration of ADL's   - Encourage mobility, OOB in chair, and early ambulation as appropriate  - Fall precautions   - Monitor for bowel and bladder dysfunction  - Monitor for and prevent skin breakdown and pressure ulcers  - Continue DVT prophylaxis with frequent ambulation              Anticipate disposition:  Home with home " health      Follow-up needed during SNF stay-    Follow-up needed after discharge from SNF: PCP, care clinic    Future Appointments   Date Time Provider Department Center   5/15/2023  9:00 AM Cody Cox MD [facility] WOUND [facility]   6/15/2023 11:15 AM Lennie Louis DPM [facility] POD [address]   6/29/2023  8:30 AM Rachael Case MD [facility] IM [place]   7/12/2023 11:30 AM Brent Chapman Jr., MD [facility] CARDIO [place]         Suzy Aparicio NP  Department of Hospital Medicine   [facility]     DOS: 5/11/2023       Patient note was created using MModal Dictation.  Any errors in syntax or even information may not have been identified and edited on initial review prior to signing this note.

## 2023-05-12 VITALS
HEIGHT: 65 IN | SYSTOLIC BLOOD PRESSURE: 142 MMHG | RESPIRATION RATE: 18 BRPM | TEMPERATURE: 98 F | OXYGEN SATURATION: 97 % | DIASTOLIC BLOOD PRESSURE: 60 MMHG | WEIGHT: 159.19 LBS | BODY MASS INDEX: 26.52 KG/M2 | HEART RATE: 74 BPM

## 2023-05-12 LAB
POCT GLUCOSE: 129 MG/DL (ref 70–110)
POCT GLUCOSE: 97 MG/DL (ref 70–110)

## 2023-05-12 PROCEDURE — 25000003 PHARM REV CODE 250: Mod: HCNC | Performed by: NURSE PRACTITIONER

## 2023-05-12 PROCEDURE — 25000003 PHARM REV CODE 250: Mod: HCNC | Performed by: HOSPITALIST

## 2023-05-12 RX ADMIN — Medication 250 MG: at 09:05

## 2023-05-12 RX ADMIN — THERA TABS 1 TABLET: TAB at 09:05

## 2023-05-12 RX ADMIN — GUAIFENESIN AND DEXTROMETHORPHAN 10 ML: 100; 10 SYRUP ORAL at 09:05

## 2023-05-12 RX ADMIN — ACETAMINOPHEN 1000 MG: 500 TABLET ORAL at 09:05

## 2023-05-12 RX ADMIN — ASPIRIN 81 MG: 81 TABLET, COATED ORAL at 09:05

## 2023-05-12 RX ADMIN — DICLOFENAC SODIUM 2 G: 10 GEL TOPICAL at 09:05

## 2023-05-12 RX ADMIN — MICONAZOLE NITRATE: 20 OINTMENT TOPICAL at 09:05

## 2023-05-12 RX ADMIN — SILVER SULFADIAZINE: 10 CREAM TOPICAL at 09:05

## 2023-05-12 RX ADMIN — FUROSEMIDE 20 MG: 20 TABLET ORAL at 09:05

## 2023-05-12 RX ADMIN — TRIAMCINOLONE ACETONIDE: 1 CREAM TOPICAL at 09:05

## 2023-05-12 RX ADMIN — ZINC SULFATE 220 MG (50 MG) CAPSULE 220 MG: CAPSULE at 09:05

## 2023-05-12 RX ADMIN — PRAVASTATIN SODIUM 40 MG: 20 TABLET ORAL at 09:05

## 2023-05-12 RX ADMIN — FERROUS SULFATE TAB 325 MG (65 MG ELEMENTAL FE) 1 EACH: 325 (65 FE) TAB at 09:05

## 2023-05-12 RX ADMIN — Medication 400 UNITS: at 09:05

## 2023-05-12 RX ADMIN — FLUTICASONE PROPIONATE 100 MCG: 50 SPRAY, METERED NASAL at 09:05

## 2023-05-12 NOTE — HOSPITAL COURSE
Patient progressed well with PT and OT- last PT note states that patient ambulated***. Patient had no significant events during their stay at SNF. Home health was set up. DME was ordered if needed. Follow up appointment to be made by patient within one week. All prescriptions and discharge instructions were ordered to be given to the patient prior to discharge.     PEx  Constitutional: Patient appears debilitated.  No distress noted  HENT:   Head: Normocephalic and atraumatic.   Eyes: Pupils are equal, round  Neck: Normal range of motion. Neck supple.   Cardiovascular: Normal rate, irregular rhythm and normal heart sounds.    Pulmonary/Chest: Effort normal and breath sounds are clear  Abdominal: Soft. Bowel sounds are normal.   Musculoskeletal: Normal range of motion.   Neurological: Alert and oriented to person, place, and time.   Psychiatric: Normal mood and affect. Behavior is normal.   Skin: Skin is warm and dry.        05/08/23 0730        Altered Skin Integrity 04/24/23 1136 Buttocks Moisture associated dermatitis Intact skin with non-blanchable redness of localized area   Date First Assessed/Time First Assessed: 04/24/23 1136   Altered Skin Integrity Present on Admission - Did Patient arrive to the hospital with altered skin?: yes  Location: (c) Buttocks  Is this injury device related?: No  Primary Wound Type: Moisture...   Dressing Appearance Open to air   Drainage Amount Scant   Drainage Characteristics/Odor Clear   Appearance Pink;Red   Tissue loss description Not applicable   Periwound Area Intact;Dry   Wound Edges Rolled/closed   Care Cleansed with:;Soap and water;Applied:;Skin Barrier        Altered Skin Integrity Left lower;lateral Leg   No Date First Assessed or Time First Assessed found.   Altered Skin Integrity Present on Admission - Did Patient arrive to the hospital with altered skin?: yes  Side: Left  Orientation: lower;lateral  Location: Leg   Dressing Appearance Dry;Intact;Clean   Drainage  Amount None   Appearance Pink;Red;Dry   Tissue loss description Not applicable   Periwound Area Intact;Dry;Other (see comments)  (dry peeling skin)   Wound Edges Jagged;Irregular   Care Cleansed with:;Antimicrobial agent;Soap and water;Applied:;Skin Barrier   Dressing Applied;Elastic bandage   Dressing Change Due 05/08/23        Altered Skin Integrity Right anterior;lower;lateral;medial;posterior Leg   No Date First Assessed or Time First Assessed found.   Altered Skin Integrity Present on Admission - Did Patient arrive to the hospital with altered skin?: yes  Side: Right  Orientation: anterior;lower;lateral;medial;posterior  Location: Leg   Dressing Appearance Dry;Clean;Moist drainage   Drainage Amount Scant   Drainage Characteristics/Odor Clear;Serous   Appearance Pink;Red;Dry   Periwound Area Intact;Dry;Pink;Redness   Wound Edges Rolled/closed   Care Cleansed with:;Antimicrobial agent;Applied:;Skin Barrier   Dressing Applied;Changed;Elastic bandage

## 2023-05-12 NOTE — NURSING
Patient is set to discharge on 5/12/23 at 11am . Patient discharging to home with daughter via personal car. Patient set up with [facility] and [facility] for Palliative care. DME provided: Rolling walker purchased by patient at discharge. Patients PCP appointment is scheduled for 5/15/2023 at 1pm.

## 2023-05-12 NOTE — PROGRESS NOTES
Food & Nutrition  Education    Diet Education: low sodium, fluid restricted, wound healing tips  Time Spent: 15 minutes  Learners:patient and daughter      Nutrition Education provided with handouts:   Protein content of food list with protein goals for healing leg wounds  Low sodium diet with sample menu.   (Patient to check that the low sodium seasoning they provide  at [facility] does not contain potassium chloride.   We discussed fluid limit of 1.5 L daily. Reviewed what counts as fluid. Patient was asking about wine intake and was referred to her next MD appointment.   Patient clarifies that her issue with Bob in the past, and she still has a supply at home, was an elevated potassium. They plan to ask Cardiology if they should continue Bob. At this point, they do not plan to buy anymore product.    Comments:she uses sugar substitute at home, she does not use salt.     We reviewed discharge orders to continue MVI, zinc supplement Bob, Vit C and Vit E per SEAN. ZEB Aparicio.    All questions and concerns answered.

## 2023-05-12 NOTE — DISCHARGE SUMMARY
[facility] Medicine  Discharge Summary      Patient Name: Jenniffer Jimenez  MRN: [id number]  NANCY: [id number]  Patient Class: IP- SNF  Admission Date: 4/27/2023  Hospital Length of Stay: 15 days  Discharge Date and Time:  05/12/2023 2:58 PM  Attending Physician: No att. providers found   Discharging Provider: Suzy Aparicio NP  Primary Care Provider: Rubi Young DO    Primary Care Team: [facility] 8 SUZY APARICIO     HPI:   Jenniffer Jimenez is a 89 y.o. female with PMH of cataract, [place], CKD, DM II, diabetic polyneuropathy, HTN, PAF, TIA presents to the ED For evaluation of fever. Admitted for AFib RVR and presumed cellulitis to right lower extremity. She was treated with IV Cardizem and IV lasix. Beta-blockers are listed as allergies. Cellulitis treated with IV vancomycin and Zosyn transitioned to Amoxicillin and doxycycline stop date 4/30. Plan to continue aggressive wound care. Arrange for follow [facility], and Wound Care clinic. Followup with Dr. Deshpande after discharge.     Hospital Course:   Patient progressed well with PT and OT- last PT note states that patient [id number] feet x 2 trials using RW with CGA. Seated rest break between trials. PT with W/C in tow. Patient initially with downward gaze; cues to look up and forward in the direction of travel. Patient initially with long strides and narrow ROSETTA; cues to decreased step length and increased ROSETTA for improved stability/balance. No LOB. Patient had no significant events during their stay at SNF.  Patient seen by Dermatology during SNF stay here.  Recommendations to see outpatient wound care provider, patient daughter to set up appointment.  Patient's lower extremity wounds improving throughout SNF stay.  Home health was set up. DME was ordered if needed. Follow up appointment to be made by patient within one week. All prescriptions and discharge instructions were ordered to be given to the patient  prior to discharge.     PEx  Constitutional: Patient appears debilitated.  No distress noted  HENT:   Head: Normocephalic and atraumatic.   Eyes: Pupils are equal, round  Neck: Normal range of motion. Neck supple.   Cardiovascular: Normal rate, irregular rhythm and normal heart sounds.    Pulmonary/Chest: Effort normal and breath sounds are clear  Abdominal: Soft. Bowel sounds are normal.   Musculoskeletal: Normal range of motion.   Neurological: Alert and oriented to person, place, and time.   Psychiatric: Normal mood and affect. Behavior is normal.   Skin: Skin is warm and dry.        05/08/23 0730        Altered Skin Integrity 04/24/23 1136 Buttocks Moisture associated dermatitis Intact skin with non-blanchable redness of localized area   Date First Assessed/Time First Assessed: 04/24/23 1136   Altered Skin Integrity Present on Admission - Did Patient arrive to the hospital with altered skin?: yes  Location: (c) Buttocks  Is this injury device related?: No  Primary Wound Type: Moisture...   Dressing Appearance Open to air   Drainage Amount Scant   Drainage Characteristics/Odor Clear   Appearance Pink;Red   Tissue loss description Not applicable   Periwound Area Intact;Dry   Wound Edges Rolled/closed   Care Cleansed with:;Soap and water;Applied:;Skin Barrier        Altered Skin Integrity Left lower;lateral Leg   No Date First Assessed or Time First Assessed found.   Altered Skin Integrity Present on Admission - Did Patient arrive to the hospital with altered skin?: yes  Side: Left  Orientation: lower;lateral  Location: Leg   Dressing Appearance Dry;Intact;Clean   Drainage Amount None   Appearance Pink;Red;Dry   Tissue loss description Not applicable   Periwound Area Intact;Dry;Other (see comments)  (dry peeling skin)   Wound Edges Jagged;Irregular   Care Cleansed with:;Antimicrobial agent;Soap and water;Applied:;Skin Barrier   Dressing Applied;Elastic bandage   Dressing Change Due 05/08/23        Altered Skin  Integrity Right anterior;lower;lateral;medial;posterior Leg   No Date First Assessed or Time First Assessed found.   Altered Skin Integrity Present on Admission - Did Patient arrive to the hospital with altered skin?: yes  Side: Right  Orientation: anterior;lower;lateral;medial;posterior  Location: Leg   Dressing Appearance Dry;Clean;Moist drainage   Drainage Amount Scant   Drainage Characteristics/Odor Clear;Serous   Appearance Pink;Red;Dry   Periwound Area Intact;Dry;Pink;Redness   Wound Edges Rolled/closed   Care Cleansed with:;Antimicrobial agent;Applied:;Skin Barrier   Dressing Applied;Changed;Elastic bandage              Goals of Care Treatment Preferences:  Code Status: Full Code      Consults:   Consults (From admission, onward)        Status Ordering Provider     Inpatient consult to Registered Dietitian/Nutritionist  Once        Provider:  (Not yet assigned)    Completed BIA MEDEROS     Inpatient consult to Registered Dietitian/Nutritionist  Once        Provider:  (Not yet assigned)    Completed EDWARD STONE          No new Assessment & Plan notes have been filed under this hospital service since the last note was generated.  Service: Hospital Medicine    Final Active Diagnoses:    Diagnosis Date Noted POA    PRINCIPAL PROBLEM:  Acute on chronic diastolic heart failure [I50.33] 05/04/2016 Yes    Community acquired pneumonia of left lower lobe of lung [J18.9] 04/26/2023 Yes    Heart failure with preserved ejection fraction [I50.30] 04/24/2023 Yes    Microcytic anemia [D50.9] 04/22/2023 Yes    Polyneuropathy in diseases classified elsewhere [G63] 03/30/2023 Yes    Pulmonary hypertension [I27.20] 01/26/2023 Yes    Multiple open wounds of right lower extremity [S81.801A] 11/08/2022 Yes    Diabetic polyneuropathy [E11.42] 01/06/2022 Yes    Presence of Watchman left atrial appendage closure device [Z95.818] 12/15/2020 Yes     Chronic    Venous insufficiency of both lower extremities  "[I87.2] 11/22/2019 Yes    History of TIA (transient ischemic attack) [Z86.73] 05/04/2016 Not Applicable    Stage 3b chronic kidney disease [N18.32] 05/04/2016 Yes    Chronic a-fib [I48.20] 01/29/2016 Yes    Meniere's disease [H81.09] 04/02/2014 Yes    Debility [R53.81] 04/25/2013 Yes    Primary hypertension [I10]  Yes     Chronic    Pure hypercholesterolemia [E78.00] 07/02/2012 Yes     Chronic      Problems Resolved During this Admission:       Discharged Condition: good    Disposition: Home-Health Care [facility]    Follow Up:    Patient Instructions:      WALKER FOR HOME USE     Order Specific Question Answer Comments   Type of Walker: Adult (5'4"-6'6")    With wheels? Yes    Height: 5' 5" (1.651 m)    Weight: 73.3 kg (161 lb 9.6 oz)    Length of need (1-99 months): 99    Please check all that apply: Patient's condition impairs ambulation.    Please check all that apply: Walker will be used for gait training.    Please check all that apply: Patient is unable to safely ambulate without equipment.      Ambulatory referral/consult to Dermatology   Standing Status: Future   Referral Priority: Urgent Referral Type: Consultation   Referral Reason: Specialty Services Required   Requested Specialty: Dermatology   Number of Visits Requested: 1     No driving until:   Order Comments: Cleared by PCP     Notify your health care provider if you experience any of the following:  temperature >100.4     Notify your health care provider if you experience any of the following:  persistent nausea and vomiting or diarrhea     Notify your health care provider if you experience any of the following:  severe uncontrolled pain     Notify your health care provider if you experience any of the following:  redness, tenderness, or signs of infection (pain, swelling, redness, odor or green/yellow discharge around incision site)     Notify your health care provider if you experience any of the following:  difficulty breathing or increased " cough     Notify your health care provider if you experience any of the following:  severe persistent headache     Notify your health care provider if you experience any of the following:  worsening rash     Notify your health care provider if you experience any of the following:  persistent dizziness, light-headedness, or visual disturbances     Notify your health care provider if you experience any of the following:  increased confusion or weakness     Activity as tolerated       Significant Diagnostic Studies: N/A    Pending Diagnostic Studies:     None         Medications:  Reconciled Home Medications:      Medication List      ASK your doctor about these medications    acetaminophen 500 MG tablet  Commonly known as: TYLENOL  Take 1,000 mg by mouth 2 (two) times a day.     albuterol-ipratropium 2.5 mg-0.5 mg/3 mL nebulizer solution  Commonly known as: DUO-NEB  Take 3 mLs by nebulization every 6 (six) hours as needed for Wheezing or Shortness of Breath. Rescue     amoxicillin-clavulanate 875-125mg 875-125 mg per tablet  Commonly known as: AUGMENTIN  Take 1 tablet by mouth every 12 (twelve) hours. End 4/30/23     aspirin 81 MG EC tablet  Commonly known as: ECOTRIN  Take 1 tablet (81 mg total) by mouth once daily.     dextromethorphan-guaiFENesin  mg/5 ml  mg/5 mL liquid  Commonly known as: ROBITUSSIN-DM  Take 5 mLs by mouth every 4 (four) hours as needed (cough).  Ask about: Should I take this medication?     diclofenac sodium 1 % Gel  Commonly known as: VOLTAREN  Apply 2 g topically 4 (four) times daily. Apply to right hip     doxycycline 100 MG tablet  Commonly known as: VIBRA-TABS  Take 1 tablet (100 mg total) by mouth every 12 (twelve) hours. End 4/30/23     ferrous sulfate 325 (65 FE) MG EC tablet  Take 1 tablet (325 mg total) by mouth once daily.     fluconazole 200 MG Tab  Commonly known as: DIFLUCAN  Take 1 tablet (200 mg total) by mouth once daily. End 5/5/23     fluticasone propionate 50  mcg/actuation nasal spray  Commonly known as: FLONASE  2 sprays (100 mcg total) by Each Nostril route once daily.     furosemide 40 MG tablet  Commonly known as: LASIX  Take 1 tablet (40 mg total) by mouth once daily.     miconazole nitrate 2% 2 % Oint  Commonly known as: MICOTIN  Apply topically 2 (two) times daily. Coloplast Clear Antifungal ointment- (green top)- nursing to apply to perineum, inner thighs, pannus, and buttocks BID     pravastatin 40 MG tablet  Commonly known as: PRAVACHOL  Take 1 tablet (40 mg total) by mouth once daily.     silver sulfADIAZINE 1% 1 % cream  Commonly known as: SILVADENE  Apply to RLE skin breakdown twice daily     SYSTANE COMPLETE OPHT  Apply to eye.     triamcinolone acetonide 0.1% 0.1 % ointment  Commonly known as: KENALOG  Apply topically 2 (two) times daily. Use to affected areas for up to 2 weeks then take a 1 week break or decrease to 3 times weekly. Do not apply to groin or face. Apply to red scaly areas on the legs and arms     vitamin E 400 UNIT capsule  Take 400 Units by mouth once daily.            Indwelling Lines/Drains at time of discharge:   Lines/Drains/Airways     None                 Time spent on the discharge of patient: 38 minutes         Suzy Aparicio NP  Department of [facility] Medicine  [facility] - Sk[facility]

## 2023-05-15 ENCOUNTER — HOSPITAL ENCOUNTER (OUTPATIENT)
Dept: WOUND CARE | Facility: HOSPITAL | Age: 88
Discharge: HOME | End: 2023-05-15
Attending: PREVENTIVE MEDICINE
Payer: MEDICARE

## 2023-05-15 ENCOUNTER — PATIENT OUTREACH (OUTPATIENT)
Dept: ADMINISTRATIVE | Facility: CLINIC | Age: 88
End: 2023-05-15
Payer: MEDICARE

## 2023-05-15 VITALS
SYSTOLIC BLOOD PRESSURE: 144 MMHG | WEIGHT: 159 LBS | HEIGHT: 65 IN | TEMPERATURE: 98 F | BODY MASS INDEX: 26.49 KG/M2 | HEART RATE: 77 BPM | DIASTOLIC BLOOD PRESSURE: 67 MMHG

## 2023-05-15 DIAGNOSIS — S81.802D OPEN WOUND OF LEFT LOWER EXTREMITY, SUBSEQUENT ENCOUNTER: ICD-10-CM

## 2023-05-15 DIAGNOSIS — S81.801D MULTIPLE OPEN WOUNDS OF RIGHT LOWER EXTREMITY, SUBSEQUENT ENCOUNTER: ICD-10-CM

## 2023-05-15 DIAGNOSIS — I87.2 VENOUS STASIS DERMATITIS OF BOTH LOWER EXTREMITIES: Primary | ICD-10-CM

## 2023-05-15 PROCEDURE — 99213 OFFICE O/P EST LOW 20 MIN: CPT | Mod: HCNC

## 2023-05-15 NOTE — PROGRESS NOTES
[facility]    Subjective:       Patient ID: Jenniffer Jimenez is a 89 y.o. female.    Chief Complaint: Wound Care    Follow up appt. Wounds to bilateral lower extremities  improved. Areas to abdomen and groin improved. Discussed plan of care with patient and family member. Understanding voiced. Wound care orders sent to home health.   Review of Systems   All other systems reviewed and are negative.      Objective:     Vitals:    05/15/23 0920   BP: (!) 144/67   Pulse: 77   Temp: 97.6 °F (36.4 °C)         Physical Exam       Altered Skin Integrity Right anterior;lower;lateral;medial;posterior Leg (Active)       Altered Skin Integrity Present on Admission - Did Patient arrive to the hospital with altered skin?: yes   Side: Right   Orientation: anterior;lower;lateral;medial;posterior   Location: Leg   Wound Number:    Is this injury device related?:    Primary Wound Type:    Description of Altered Skin Integrity:    Ankle-Brachial Index:    Pulses:    Removal Indication and Assessment:    Wound Outcome:    (Retired) Wound Length (cm):    (Retired) Wound Width (cm):    (Retired) Depth (cm):    Wound Description (Comments):    Removal Indications:    Wound Image   05/15/23 0951   Dressing Appearance Open to air 05/15/23 0951   Drainage Amount Scant 05/15/23 0951   Drainage Characteristics/Odor Serous 05/15/23 0951   Appearance Pink;Moist 05/15/23 0951   Tissue loss description Partial thickness 05/15/23 0951   Red (%), Wound Tissue Color 100 % 05/15/23 0951   Periwound Area Dry;Redness 05/15/23 0951   Wound Edges Undefined 05/15/23 0951   Wound Length (cm) 18 cm 05/15/23 0951   Wound Width (cm) 23 cm 05/15/23 0951   Wound Surface Area (cm^2) 414 cm^2 05/15/23 0951   Care Cleansed with:;Antimicrobial agent 05/15/23 0951   Dressing Applied;Rolled gauze;Tubular bandage 05/15/23 0951   Periwound Care Topical treatment applied 05/15/23 0951   Compression Tubular elasticized  bandage 05/15/23 0951   Dressing Change Due 05/18/23 05/15/23 0951            Altered Skin Integrity Left lower;lateral Leg (Active)       Altered Skin Integrity Present on Admission - Did Patient arrive to the hospital with altered skin?: yes   Side: Left   Orientation: lower;lateral   Location: Leg   Wound Number:    Is this injury device related?:    Primary Wound Type:    Description of Altered Skin Integrity:    Ankle-Brachial Index:    Pulses:    Removal Indication and Assessment:    Wound Outcome:    (Retired) Wound Length (cm):    (Retired) Wound Width (cm):    (Retired) Depth (cm):    Wound Description (Comments):    Removal Indications:    Wound Image   05/15/23 0951   Dressing Appearance Intact 05/15/23 0951   Drainage Amount None 05/15/23 0951   Appearance Pink;Dry 05/15/23 0951   Tissue loss description Not applicable 05/15/23 0951   Red (%), Wound Tissue Color 100 % 05/15/23 0951   Periwound Area Dry 05/15/23 0951   Wound Edges Other (see comments) 05/15/23 0951   Wound Length (cm) 0 cm 05/15/23 0951   Wound Width (cm) 0 cm 05/15/23 0951   Wound Depth (cm) 0 cm 05/15/23 0951   Wound Volume (cm^3) 0 cm^3 05/15/23 0951   Wound Surface Area (cm^2) 0 cm^2 05/15/23 0951   Care Cleansed with:;Soap and water 05/15/23 0951   Dressing Applied;Tubular bandage 05/15/23 0951   Periwound Care Moisturizer applied 05/15/23 0951   Compression Tubular elasticized bandage 05/15/23 0951   Dressing Change Due 05/18/23 05/15/23 0951         Assessment/Plan:         ICD-10-CM ICD-9-CM   1. Venous stasis dermatitis of both lower extremities  I87.2 454.1   2. Multiple open wounds of right lower extremity, subsequent encounter  S81.801D V58.89     894.0   3. Open wound of left lower extremity, subsequent encounter  S81.802D V58.89     891.0           Tissue pathology and/or culture taken:  [] Yes [x] No   Sharp debridement performed:   [] Yes [x] No   Labs ordered this visit:   [] Yes [x] No   Imaging ordered this visit:   []  Yes [x] No           Orders Placed This Encounter   Procedures    Change dressing     Right lower leg:   Cleanse wound with: Soap and water or Normal Saline  Lidocaine: PRN   Silver nitrate: PRN   Periwound care: Triamcinolone 0.1% to dry scaly areas   Primary dressing: Xeroform to right lower leg wound, then apply ABD pads   Secondary dressing: cast padding, then tubigrip E to bilateral legs. Lotion and Tubi  to LLE   Edema control: tubigrip E to bilateral legs toe to knees, elevate legs when sitting   Frequency: three times weekly and as needed   Follow-up: Dr. Cox 2 weeks  Home Health: Please continue wound care as above three times weekly and as needed except when the patient is in clinic.  Please arrange for bathing assistance three times weekly before dressings are changed, ok to take off leg bandages and wash legs in the shower with soap and water.    Change dressing     Right lower leg:   Cleanse wound with: Soap and water or Normal Saline   Lidocaine: PRN   Silver nitrate: PRN   Periwound care: Triamcinolone 0.1% to dry scaly areas   Primary dressing: Xeroform to right lower leg wound, then apply ABD pads   Secondary dressing: cast padding, then tubigrip E to bilateral legs. Lotion and Tubi  to LLE   Edema control: tubigrip E to bilateral legs toe to knees, elevate legs when sitting   Frequency: three times weekly and as needed    Abd and groin:  Apply antifungal cream, ABD pad as needed and intradry as needed      Follow-up: Dr. Cox 2 weeks     Home Health: Please continue wound care as above three times weekly and as needed except when the patient is in clinic.  Please arrange for bathing assistance three times weekly before dressings are changed, ok to take off leg bandages and wash legs in the shower with soap and water.        Follow up in about 2 weeks (around 5/29/2023).

## 2023-05-15 NOTE — PROGRESS NOTES
C3 nurse attempted to contact Jenniffer Guillory for a TCC post hospital discharge follow up call. No answer at phone number listed. Left voice mail   Has apt with Elenita Hastings Monday May 15, 2023 1:00 PM  Reed Ruiz MD Thursday May 18, 2023 10:00 AM

## 2023-05-17 ENCOUNTER — TELEPHONE (OUTPATIENT)
Dept: INTERNAL MEDICINE | Facility: CLINIC | Age: 88
End: 2023-05-17
Payer: MEDICARE

## 2023-05-18 ENCOUNTER — OFFICE VISIT (OUTPATIENT)
Dept: INTERNAL MEDICINE | Facility: CLINIC | Age: 88
End: 2023-05-18
Payer: MEDICARE

## 2023-05-18 ENCOUNTER — EXTERNAL HOME HEALTH (OUTPATIENT)
Dept: HOME HEALTH SERVICES | Facility: HOSPITAL | Age: 88
End: 2023-05-18
Payer: MEDICARE

## 2023-05-18 VITALS
OXYGEN SATURATION: 97 % | HEART RATE: 92 BPM | SYSTOLIC BLOOD PRESSURE: 110 MMHG | HEIGHT: 65 IN | TEMPERATURE: 98 F | BODY MASS INDEX: 26.6 KG/M2 | WEIGHT: 159.63 LBS | DIASTOLIC BLOOD PRESSURE: 60 MMHG

## 2023-05-18 DIAGNOSIS — L03.119 CELLULITIS OF LOWER EXTREMITY, UNSPECIFIED LATERALITY: ICD-10-CM

## 2023-05-18 DIAGNOSIS — I50.32 CHRONIC DIASTOLIC HEART FAILURE: ICD-10-CM

## 2023-05-18 DIAGNOSIS — I50.33 ACUTE ON CHRONIC DIASTOLIC HEART FAILURE: ICD-10-CM

## 2023-05-18 DIAGNOSIS — L60.9 NAIL ABNORMALITIES: ICD-10-CM

## 2023-05-18 DIAGNOSIS — Z09 HOSPITAL DISCHARGE FOLLOW-UP: Primary | ICD-10-CM

## 2023-05-18 PROCEDURE — 99214 OFFICE O/P EST MOD 30 MIN: CPT | Mod: HCNC,PO | Performed by: INTERNAL MEDICINE

## 2023-05-18 PROCEDURE — 99495 TRANSJ CARE MGMT MOD F2F 14D: CPT | Mod: ,,, | Performed by: INTERNAL MEDICINE

## 2023-05-18 PROCEDURE — 1126F AMNT PAIN NOTED NONE PRSNT: CPT | Mod: CPTII,,, | Performed by: INTERNAL MEDICINE

## 2023-05-18 PROCEDURE — 3288F FALL RISK ASSESSMENT DOCD: CPT | Mod: CPTII,,, | Performed by: INTERNAL MEDICINE

## 2023-05-18 PROCEDURE — 1101F PT FALLS ASSESS-DOCD LE1/YR: CPT | Mod: CPTII,,, | Performed by: INTERNAL MEDICINE

## 2023-05-18 PROCEDURE — 1111F DSCHRG MED/CURRENT MED MERGE: CPT | Mod: CPTII,,, | Performed by: INTERNAL MEDICINE

## 2023-05-18 PROCEDURE — 1101F PR PT FALLS ASSESS DOC 0-1 FALLS W/OUT INJ PAST YR: ICD-10-PCS | Mod: CPTII,,, | Performed by: INTERNAL MEDICINE

## 2023-05-18 PROCEDURE — 1160F PR REVIEW ALL MEDS BY PRESCRIBER/CLIN PHARMACIST DOCUMENTED: ICD-10-PCS | Mod: CPTII,,, | Performed by: INTERNAL MEDICINE

## 2023-05-18 PROCEDURE — 1126F PR PAIN SEVERITY QUANTIFIED, NO PAIN PRESENT: ICD-10-PCS | Mod: CPTII,,, | Performed by: INTERNAL MEDICINE

## 2023-05-18 PROCEDURE — 1159F MED LIST DOCD IN RCRD: CPT | Mod: CPTII,,, | Performed by: INTERNAL MEDICINE

## 2023-05-18 PROCEDURE — 3288F PR FALLS RISK ASSESSMENT DOCUMENTED: ICD-10-PCS | Mod: CPTII,,, | Performed by: INTERNAL MEDICINE

## 2023-05-18 PROCEDURE — 99495 TCM SERVICES (MODERATE COMPLEXITY): ICD-10-PCS | Mod: ,,, | Performed by: INTERNAL MEDICINE

## 2023-05-18 PROCEDURE — 99999 PR PBB SHADOW E&M-EST. PATIENT-LVL IV: ICD-10-PCS | Mod: PBBFAC,,, | Performed by: INTERNAL MEDICINE

## 2023-05-18 PROCEDURE — 1159F PR MEDICATION LIST DOCUMENTED IN MEDICAL RECORD: ICD-10-PCS | Mod: CPTII,,, | Performed by: INTERNAL MEDICINE

## 2023-05-18 PROCEDURE — 1160F RVW MEDS BY RX/DR IN RCRD: CPT | Mod: CPTII,,, | Performed by: INTERNAL MEDICINE

## 2023-05-18 PROCEDURE — 1111F PR DISCHARGE MEDS RECONCILED W/ CURRENT OUTPATIENT MED LIST: ICD-10-PCS | Mod: CPTII,,, | Performed by: INTERNAL MEDICINE

## 2023-05-18 PROCEDURE — 99999 PR PBB SHADOW E&M-EST. PATIENT-LVL IV: CPT | Mod: PBBFAC,,, | Performed by: INTERNAL MEDICINE

## 2023-05-18 RX ORDER — SPIRONOLACTONE 25 MG/1
TABLET ORAL
COMMUNITY
Start: 2023-03-24 | End: 2023-05-25

## 2023-05-18 RX ORDER — TRIAMCINOLONE ACETONIDE 0.25 MG/G
OINTMENT TOPICAL
COMMUNITY
Start: 2023-05-13 | End: 2024-01-27 | Stop reason: ALTCHOICE

## 2023-05-18 RX ORDER — DOXYLAMINE SUCCINATE 25 MG
TABLET ORAL
COMMUNITY
Start: 2023-05-12

## 2023-05-18 RX ORDER — FUROSEMIDE 20 MG/1
TABLET ORAL
COMMUNITY
Start: 2023-05-14 | End: 2023-05-25

## 2023-05-18 NOTE — PROGRESS NOTES
Subjective:       Patient ID: Jenniffer Jimenez is a 89 y.o. female.    Chief Complaint: hospital f/u    HPI    89-year-old female here for follow-up.  From hospital 04/27/2023, discharged from skilled nursing 05/12/2023    Family and/or Caretaker present at visit?  Yes.  Diagnostic tests reviewed/disposition: I have reviewed all completed as well as pending diagnostic tests at the time of discharge.  Disease/illness education:  Right lower extremity cellulitis acute on chronic diastolic heart failure  Home health/community services discussion/referrals: Patient has home health established at [facility] .   Establishment or re-establishment of referral orders for community resources: No other necessary community resources.   Discussion with other health care providers: No discussion with other health care providers necessary.  I reviewed and reconciled the medications from hospital discharge.    She has had this since July.  She was bed ridden after getting too much oxycodone and got delirious and had a 5 week hospital stay.  She has wound care at [place].  She had a fever and SOB and was diagnosed with sepsis and admitted her.  She was in for a week and went to SNF.  She needs to move.  She is tired, because she is doing the bike daily for an hour.  She has to keep moving.  She has had improvement in her legs since she has been moving more.  She is not on antibiotics currently.  She weighs herself at home and is on lasix 40 mg daily.    Discharge summary:  HPI:   89 yo female with a PMH of cataract, [place], CKD, DM II, diabetic polyneuropathy, HTN, PAF, TIA presents to the ED For evaluation of fever noted by home health nurse. RLE appears edematous, erythematous, weeping worse for unknown duration. She also has a rash to her perineal area and back. She notes pain to the RLE. Since in the ED she was noted to be in afib RVR, which is now rate controlled with a dose of IV cardizem. Patient is a poor historian, steven hx  of psychiatric problem.      I tried to phone POA for further hx- no answer.      Current OP medications as reviewed per recent cardiology note:        acetaminophen (TYLENOL) 500 MG tablet, Take 1,000 mg by mouth 2 (two) times a day., Disp: , Rfl:     aspirin (ECOTRIN) 81 MG EC tablet, Take 1 tablet (81 mg total) by mouth once daily., Disp: 90 tablet, Rfl: 3    diclofenac sodium (VOLTAREN) 1 % Gel, Apply 2 g topically 4 (four) times daily. Use gloves to apply to right hip for 10 days, Disp: 100 g, Rfl: 1    miconazole NITRATE 2 % (MICOTIN) 2 % top powder, Apply topically 2 (two) times daily. To RLE., Disp: , Rfl: 0    nystatin (MYCOSTATIN) powder, Apply topically 2 (two) times daily., Disp: 30 g, Rfl: 1    pravastatin (PRAVACHOL) 40 MG tablet, Take 1 tablet (40 mg total) by mouth once daily., Disp: 90 tablet, Rfl: 3    silver sulfADIAZINE 1% (SILVADENE) 1 % cream, Apply to RLE skin breakdown twice daily, Disp: , Rfl:     triamcinolone acetonide 0.5% (KENALOG) 0.5 % Crea, Apply to legs with dressing changes., Disp: 454 g, Rfl: 0    vit C/E/Zn/coppr/lutein/zeaxan (OCUVITE LUTEIN AND ZEAXANTHIN ORAL), Take 1 capsule by mouth once daily. Lutien 25 mg, Zeaxanthin 5 mg, Disp: , Rfl:     vitamin E 400 UNIT capsule, Take 400 Units by mouth once daily., Disp: , Rfl:     furosemide (LASIX) 20 MG tablet, Take 1 tablet (20 mg total) by mouth once daily. Hold for weights 150 lb or less, Disp: 30 tablet, Rfl: 11    QUEtiapine (SEROQUEL) 25 MG Tab, Take 1 tablet (25 mg total) by mouth every evening. (Patient not taking: Reported on 4/12/2023), Disp: 30 tablet, Rfl: 11    sodium zirconium cyclosilicate (LOKELMA) 5 gram packet, Take 1 packet (5 g total) by mouth 2 (two) times a day. Mix entire contents of packet(s) into drinking glass containing 3 tablespoons of water; stir well and drink immediately. Add water and repeat until no powder remains to receive entire dose., Disp: 60 packet, Rfl: 3         * No surgery found *        Hospital Course:   Ms. Jimenez was admitted for AFib RVR and fever, presumed cellulitis to right lower extremity.  She was treated with IV Cardizem for RVR.  Beta-blockers are listed as allergies on her allergy profile.  She was treated with IV vancomycin and Zosyn.     She was also treated with IV Lasix for acute exacerbation of chronic diastolic heart failure. Now appears euvolemic. Have increased home lasix dose from 20mg->40mg. Did repeat CXR which is concerning for development of LLL opacification, c/f CAP. On oral antibiotics. Now weaned off supplemental O2. Per ID 4/24 low concern for an active infection going on in the wound. Will continue aggressive wound care. Arrange for follow up with [facility], Cardiology, and Wound Care clinic. Discharge to SNF.    Skilled nursing discharge:  HPI:   Jenniffer Jimenez is a 89 y.o. female with PMH of cataract, [place], CKD, DM II, diabetic polyneuropathy, HTN, PAF, TIA presents to the ED For evaluation of fever. Admitted for AFib RVR and presumed cellulitis to right lower extremity. She was treated with IV Cardizem and IV lasix. Beta-blockers are listed as allergies. Cellulitis treated with IV vancomycin and Zosyn transitioned to Amoxicillin and doxycycline stop date 4/30. Plan to continue aggressive wound care. Arrange for follow up with [facility], Cardiology, and Wound Care clinic. Followup with Dr. Deshpande after discharge.      Hospital Course:   Patient progressed well with PT and OT- last PT note states that patient [id number] feet x 2 trials using RW with CGA. Seated rest break between trials. PT with W/C in tow. Patient initially with downward gaze; cues to look up and forward in the direction of travel. Patient initially with long strides and narrow ROSETTA; cues to decreased step length and increased ROSETTA for improved stability/balance. No LOB. Patient had no significant events during their stay at SNF.  Patient seen by Dermatology during SNF stay here.   Recommendations to see outpatient wound care provider, patient daughter to set up appointment.  Patient's lower extremity wounds improving throughout SNF stay.  Home health was set up. DME was ordered if needed. Follow up appointment to be made by patient within one week. All prescriptions and discharge instructions were ordered to be given to the patient prior to discharge.        Review of Systems      Objective:      Physical Exam  Vitals reviewed.   Constitutional:       Appearance: She is well-developed.   HENT:      Head: Normocephalic and atraumatic.      Mouth/Throat:      Pharynx: No oropharyngeal exudate.   Eyes:      General: No scleral icterus.        Right eye: No discharge.         Left eye: No discharge.      Pupils: Pupils are equal, round, and reactive to light.   Neck:      Thyroid: No thyromegaly.      Trachea: No tracheal deviation.   Cardiovascular:      Rate and Rhythm: Normal rate and regular rhythm.      Heart sounds: Normal heart sounds. No murmur heard.    No friction rub. No gallop.   Pulmonary:      Effort: Pulmonary effort is normal. No respiratory distress.      Breath sounds: Normal breath sounds. No wheezing or rales.   Chest:      Chest wall: No tenderness.   Abdominal:      General: Bowel sounds are normal. There is no distension.      Palpations: Abdomen is soft. There is no mass.      Tenderness: There is no abdominal tenderness. There is no guarding or rebound.   Musculoskeletal:         General: No tenderness. Normal range of motion.      Cervical back: Normal range of motion and neck supple.   Skin:     General: Skin is warm and dry.      Coloration: Skin is not pale.      Findings: No erythema or rash.   Neurological:      Mental Status: She is alert and oriented to person, place, and time.   Psychiatric:         Behavior: Behavior normal.             Assessment:       1. Hospital discharge follow-up    2. Cellulitis of lower extremity, unspecified laterality    3. Chronic  diastolic heart failure    4. Acute on chronic diastolic heart failure      Plan:       1/2.  Cellulitis has resolved.    3/4.  Continue Lasix 40 mg daily.  Continue to follow with wound care.    Return to clinic in 1 month or sooner if needed.

## 2023-05-23 ENCOUNTER — PATIENT MESSAGE (OUTPATIENT)
Dept: PODIATRY | Facility: CLINIC | Age: 88
End: 2023-05-23
Payer: MEDICARE

## 2023-05-23 ENCOUNTER — TELEPHONE (OUTPATIENT)
Dept: PODIATRY | Facility: CLINIC | Age: 88
End: 2023-05-23
Payer: MEDICARE

## 2023-05-23 NOTE — TELEPHONE ENCOUNTER
Called pat daughter back and she ask for an earlier appointment. Offered her 5/31/23 and I saw that she has appt with Dr. Vences on 6/2 and one with Dr. Malloy on 6/15. Ask her if she want to cancel these appointments, its for nailcare. She said she would like to know if the insurance is paying for that if she is coming on 5/31/23. Told the patient that usually the insurance paying it is every 3 mon. She said she will keep her appt on 6/2 and don't want to come in earlier

## 2023-05-23 NOTE — TELEPHONE ENCOUNTER
----- Message from Syeda Gaming MA sent at 5/23/2023  8:25 AM CDT -----  Regarding: Rescheduled  Contact: Afshan at  [phone]  Pt  daughter is calling to reschedule her appt would like to be  rescheduled before  June 1st. Afshan declined the next available appt pls call Afshan at  [phone]

## 2023-05-23 NOTE — TELEPHONE ENCOUNTER
----- Message from Nereida Agarwal sent at 5/23/2023 12:43 PM CDT -----  Contact: daughter  Type:  Patient Returning Call    Who Called:pt daughter   Who Left Message for Patient:Lizzie  Does the patient know what this is regarding?:reschedule   Would the patient rather a call back or a response via [email or web address]? Call   Best Call Back Number:  Additional Information:

## 2023-05-24 ENCOUNTER — TELEPHONE (OUTPATIENT)
Dept: INTERNAL MEDICINE | Facility: CLINIC | Age: 88
End: 2023-05-24
Payer: MEDICARE

## 2023-05-24 ENCOUNTER — DOCUMENT SCAN (OUTPATIENT)
Dept: HOME HEALTH SERVICES | Facility: HOSPITAL | Age: 88
End: 2023-05-24
Payer: MEDICARE

## 2023-05-24 DIAGNOSIS — R26.81 GAIT INSTABILITY: ICD-10-CM

## 2023-05-24 DIAGNOSIS — Z78.9 UNABLE TO CARE FOR SELF: Primary | ICD-10-CM

## 2023-05-24 DIAGNOSIS — R53.81 DEBILITY: ICD-10-CM

## 2023-05-24 NOTE — TELEPHONE ENCOUNTER
Received request for pallitive care with [facility] after family had informational meeting with agency. Need external referral for pallative care.

## 2023-05-25 ENCOUNTER — LAB VISIT (OUTPATIENT)
Dept: LAB | Facility: HOSPITAL | Age: 88
End: 2023-05-25
Attending: INTERNAL MEDICINE
Payer: MEDICARE

## 2023-05-25 ENCOUNTER — OFFICE VISIT (OUTPATIENT)
Dept: CARDIOLOGY | Facility: CLINIC | Age: 88
End: 2023-05-25
Payer: MEDICARE

## 2023-05-25 VITALS — HEIGHT: 65 IN | WEIGHT: 163.13 LBS | BODY MASS INDEX: 27.18 KG/M2

## 2023-05-25 DIAGNOSIS — I70.0 ATHEROSCLEROSIS OF AORTA: Chronic | ICD-10-CM

## 2023-05-25 DIAGNOSIS — I50.31 ACUTE HEART FAILURE WITH PRESERVED EJECTION FRACTION: Chronic | ICD-10-CM

## 2023-05-25 DIAGNOSIS — I10 PRIMARY HYPERTENSION: Chronic | ICD-10-CM

## 2023-05-25 DIAGNOSIS — I87.2 VENOUS STASIS DERMATITIS OF BOTH LOWER EXTREMITIES: Chronic | ICD-10-CM

## 2023-05-25 DIAGNOSIS — Z95.818 PRESENCE OF WATCHMAN LEFT ATRIAL APPENDAGE CLOSURE DEVICE: Chronic | ICD-10-CM

## 2023-05-25 DIAGNOSIS — I50.33 ACUTE ON CHRONIC DIASTOLIC HEART FAILURE: ICD-10-CM

## 2023-05-25 DIAGNOSIS — I48.20 CHRONIC A-FIB: Chronic | ICD-10-CM

## 2023-05-25 DIAGNOSIS — I50.31 ACUTE HEART FAILURE WITH PRESERVED EJECTION FRACTION: Primary | Chronic | ICD-10-CM

## 2023-05-25 DIAGNOSIS — E78.00 PURE HYPERCHOLESTEROLEMIA: Chronic | ICD-10-CM

## 2023-05-25 PROBLEM — I50.30 HEART FAILURE WITH PRESERVED EJECTION FRACTION: Chronic | Status: ACTIVE | Noted: 2023-04-24

## 2023-05-25 LAB
ANION GAP SERPL CALC-SCNC: 10 MMOL/L (ref 8–16)
BUN SERPL-MCNC: 34 MG/DL (ref 10–30)
CALCIUM SERPL-MCNC: 8.6 MG/DL (ref 8.7–10.5)
CHLORIDE SERPL-SCNC: 103 MMOL/L (ref 95–110)
CO2 SERPL-SCNC: 24 MMOL/L (ref 23–29)
CREAT SERPL-MCNC: 1.2 MG/DL (ref 0.5–1.4)
EST. GFR  (NO RACE VARIABLE): 42.7 ML/MIN/1.73 M^2
GLUCOSE SERPL-MCNC: 90 MG/DL (ref 70–110)
POTASSIUM SERPL-SCNC: 4.3 MMOL/L (ref 3.5–5.1)
SODIUM SERPL-SCNC: 137 MMOL/L (ref 136–145)

## 2023-05-25 PROCEDURE — 99215 PR OFFICE/OUTPT VISIT, EST, LEVL V, 40-54 MIN: ICD-10-PCS | Mod: S$GLB,,, | Performed by: INTERNAL MEDICINE

## 2023-05-25 PROCEDURE — 1111F DSCHRG MED/CURRENT MED MERGE: CPT | Mod: CPTII,S$GLB,, | Performed by: INTERNAL MEDICINE

## 2023-05-25 PROCEDURE — 99999 PR PBB SHADOW E&M-EST. PATIENT-LVL III: ICD-10-PCS | Mod: PBBFAC,,, | Performed by: INTERNAL MEDICINE

## 2023-05-25 PROCEDURE — 1101F PT FALLS ASSESS-DOCD LE1/YR: CPT | Mod: CPTII,S$GLB,, | Performed by: INTERNAL MEDICINE

## 2023-05-25 PROCEDURE — 3288F PR FALLS RISK ASSESSMENT DOCUMENTED: ICD-10-PCS | Mod: CPTII,S$GLB,, | Performed by: INTERNAL MEDICINE

## 2023-05-25 PROCEDURE — 80048 BASIC METABOLIC PNL TOTAL CA: CPT | Performed by: INTERNAL MEDICINE

## 2023-05-25 PROCEDURE — 1126F PR PAIN SEVERITY QUANTIFIED, NO PAIN PRESENT: ICD-10-PCS | Mod: CPTII,S$GLB,, | Performed by: INTERNAL MEDICINE

## 2023-05-25 PROCEDURE — 1126F AMNT PAIN NOTED NONE PRSNT: CPT | Mod: CPTII,S$GLB,, | Performed by: INTERNAL MEDICINE

## 2023-05-25 PROCEDURE — 36415 COLL VENOUS BLD VENIPUNCTURE: CPT | Performed by: INTERNAL MEDICINE

## 2023-05-25 PROCEDURE — 1111F PR DISCHARGE MEDS RECONCILED W/ CURRENT OUTPATIENT MED LIST: ICD-10-PCS | Mod: CPTII,S$GLB,, | Performed by: INTERNAL MEDICINE

## 2023-05-25 PROCEDURE — 99999 PR PBB SHADOW E&M-EST. PATIENT-LVL III: CPT | Mod: PBBFAC,,, | Performed by: INTERNAL MEDICINE

## 2023-05-25 PROCEDURE — 1101F PR PT FALLS ASSESS DOC 0-1 FALLS W/OUT INJ PAST YR: ICD-10-PCS | Mod: CPTII,S$GLB,, | Performed by: INTERNAL MEDICINE

## 2023-05-25 PROCEDURE — 3288F FALL RISK ASSESSMENT DOCD: CPT | Mod: CPTII,S$GLB,, | Performed by: INTERNAL MEDICINE

## 2023-05-25 PROCEDURE — 1159F MED LIST DOCD IN RCRD: CPT | Mod: CPTII,S$GLB,, | Performed by: INTERNAL MEDICINE

## 2023-05-25 PROCEDURE — 99215 OFFICE O/P EST HI 40 MIN: CPT | Mod: S$GLB,,, | Performed by: INTERNAL MEDICINE

## 2023-05-25 PROCEDURE — 1159F PR MEDICATION LIST DOCUMENTED IN MEDICAL RECORD: ICD-10-PCS | Mod: CPTII,S$GLB,, | Performed by: INTERNAL MEDICINE

## 2023-05-25 RX ORDER — FUROSEMIDE 20 MG/1
TABLET ORAL
Qty: 100 TABLET | Refills: 11 | Status: SHIPPED | OUTPATIENT
Start: 2023-05-25 | End: 2023-08-17 | Stop reason: SDUPTHER

## 2023-05-25 NOTE — PROGRESS NOTES
Subjective:   05/25/2023     Patient ID:  Jenniffer Jimenez is a 89 y.o. female who presents for evaulation of Shortness of Breath and Congestive Heart Failure      Comes in for follow-up of heart failure.  Your weight has gone up.  Yesterday she was short of breath, weight had increased, was taking furosemide 40 mg daily, took an extra 40 mg last night.  Her breathing is better today.    Has chronic atrial fibrillation, rate controlled, no anticoagulation post Watchman.          Echocardiogram 4/23:   · The left ventricle is normal in size with concentric hypertrophy and normal systolic function.  · The estimated ejection fraction is 55%.  · Indeterminate left ventricular diastolic function.  · With normal right ventricular systolic function.  · Severe left atrial enlargement.  · Severe right atrial enlargement.  · Mild mitral regurgitation.  · Mild to moderate tricuspid regurgitation.  · There is pulmonary hypertension.  · The estimated PA systolic pressure is 56 mmHg.  · Elevated central venous pressure (15 mmHg).       Prior note:   She comes in for cardiac follow-up.  Lots of problems recently with lower extremity cellulitis.  Hospitalized for that, developed delirium.  Subsequent to our last visit, she developed dehydration was seen in the emergency room.  Her weight has increased since then from 149 lb to 155 lb.  She is not had chest pains or tightness    She has not had increasing chest pains or tightness, shortness of breath, PND or orthopnea.  She is status placement of a left atrial appendage occluder 2021, November.  Follow up in [facility], note made that she should take SBE prophylaxis for life, she has not been doing that.    She has previous had diastolic heart failure, shortness of breath not increasing, she was on spironolactone, caused hyperkalemia.    Chronic atrial fibrillation is present, currently off of anticoagulation, only on aspirin.  No bleeding problems.        Prior  note:    She presented for evaluation of chest pain in February of 2021.  She had noted increasing swelling in her legs.  She had taken additional furosemide.  She is status post Watchman device placement in December of 2020.  She is off anticoagulation, now on aspirin only.  She has not had bleeding problems.  She does not have a history of chest pain.  Cardiac catheterization performed many years ago was normal.  Echocardiography continue show evidence for diastolic dysfunction with increased PA pressures and increased left atrial size.  She has not had increasing shortness of breath.  Her weight has been stable, taking additional furosemide and aldosterone is needed.  Laboratory work recently has shown stable renal to and potassium.     Hypercholesterolemia is on present, on moderate dose statin therapy.  Prescription refilled     She does have chronic atrial fibrillation is status post Watchman device as noted above.  Her heart rate is well controlled.     She was felt to have acute on chronic chronic diastolic heart failure.  My recommendations then were:  1.  Discontinue potassium chloride  2.  Take furosemide 20 mg twice a day to get rid of fluid, decrease to once a day when fluid improved.  3.  Take spironolactone 25 mg 1 with each furosemide.  4.  Call me if you have recurrent chest pain.  5.  Weigh yourself daily.  Record this and bring it in with your next visit     I saw her 2 weeks after the initial presentation.  She was markedly improved.  Recommendations then were:    1.  Decrease Lasix/furosemide and Aldactone/spironolactone to 1 a day as long as weight stays below 156 lb, okay to increase to twice a day for increased weight.  2.  Please do a blood test for me today to check kidney function.    She developed an episode of lightheadedness and her weight target was changed to 160lb.      Echocardiogram from January 2022:  · The left ventricle is normal in size with normal systolic function.  · The  estimated ejection fraction is 65%.  · Severe left atrial enlargement.  · Grade III left ventricular diastolic dysfunction.  · The estimated PA systolic pressure is 51 mmHg.  · Normal right ventricular size with normal right ventricular systolic function.  · There is mild pulmonary hypertension.  · Intermediate central venous pressure (8 mmHg).  · Moderate right atrial enlargement.  · Moderate tricuspid regurgitation.  · Mild mitral regurgitation.       Recent carotid ultrasound shows mild bilateral disease:  There is 20-39% right Internal Carotid Stenosis.  There is 40-49% left Internal Carotid Stenosis.      Congestive Heart Failure  Pertinent negatives include no chest pain, claudication, near-syncope or palpitations.   Hypertension  Associated symptoms include headaches. Pertinent negatives include no chest pain, orthopnea, palpitations or PND.   Hyperlipidemia  Pertinent negatives include no chest pain.     Patient Active Problem List   Diagnosis    Pure hypercholesterolemia    Pseudophakia, both eyes    Stable central retinal vein occlusion of left eye    Chronic rhinitis    Hearing loss, sensorineural    Primary hypertension    Arthritis    Exotropia of both eyes    Debility    Gait instability    Meniere's disease    Facet arthropathy, lumbosacral    Lumbar spinal stenosis    Hypermetropia of right eye    Chronic a-fib    Stage 3b chronic kidney disease    Atherosclerosis of aorta    Acute on chronic diastolic heart failure    History of TIA (transient ischemic attack)    Osteoporosis    Primary osteoarthritis of right shoulder    History of strabismus surgery    Encounter for long-term (current) use of antiplatelets/antithrombotics    Prediabetes    Refractive error    Posterior vitreous detachment, bilateral    Dry eye syndrome    Overweight (BMI 25.0-29.9)    Risk for falls    OAB (overactive bladder)    Venous stasis dermatitis of both lower extremities    BRVO (branch retinal vein occlusion)     "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 eye with active choroidal neovascularization    Hypertensive retinopathy of both eyes    Unspecified inflammatory spondylopathy, lumbosacral region    Presence of Watchman left atrial appendage closure device    Normocytic anemia    Cervicogenic headache    Diabetic polyneuropathy    Senile purpura    Skin tear of forearm without complication, left, initial encounter    Tear of left supraspinatus tendon    Weakness of shoulder    Impaired function of upper extremity    Normocytic normochromic anemia    Multiple open wounds of right lower extremity    Unable to care for self    Hyperkalemia    Trochanteric bursitis of right hip    Intertrigo    Generalized skin eruption due to medication taken internally    High anion gap metabolic acidosis    Delirium due to medical condition with behavioral disturbance    Pulmonary hypertension    History of diabetes mellitus    Polyneuropathy in diseases classified elsewhere    Microcytic anemia    Heart failure with preserved ejection fraction    Community acquired pneumonia of left lower lobe of lung        Review of patient's allergies indicates:   Allergen Reactions    Opioids - morphine analogues Other (See Comments)     Bowel issues; bowel obstruction    Tizanidine Other (See Comments)     "Lips were numb,  Almost passed out."    Tramadol Hallucinations    Beta-blockers (beta-adrenergic blocking agts) Other (See Comments)     Can not go on beta blockers for long period of time - due to taking allergy injections    Morphine     Opioids-meperidine and related     Ciprofloxacin Rash         Current Outpatient Medications:     acetaminophen (TYLENOL) 500 MG tablet, Take 1,000 mg by mouth 2 (two) times a day., Disp: , Rfl:     albuterol-ipratropium (DUO-NEB) 2.5 mg-0.5 mg/3 mL nebulizer solution, Take 3 mLs by nebulization every 6 (six) hours as needed for Wheezing or Shortness of Breath. Rescue, Disp: 75 mL, Rfl: 0    " amoxicillin-clavulanate 875-125mg (AUGMENTIN) 875-125 mg per tablet, Take 1 tablet by mouth every 12 (twelve) hours. End 4/30/23, Disp: , Rfl:     aspirin (ECOTRIN) 81 MG EC tablet, Take 1 tablet (81 mg total) by mouth once daily., Disp: 90 tablet, Rfl: 3    chlorpheniramine-pseudoephedrine-acetaminophen (SINUTAB) 2- mg per tablet, 2 tablet 2 TIMES DAILY (route: oral), Disp: , Rfl:     diclofenac sodium (VOLTAREN) 1 % Gel, Apply 2 g topically 4 (four) times daily. Apply to right hip, Disp: , Rfl:     doxycycline (VIBRA-TABS) 100 MG tablet, Take 1 tablet (100 mg total) by mouth every 12 (twelve) hours. End 4/30/23, Disp: , Rfl:     ferrous sulfate 325 (65 FE) MG EC tablet, Take 1 tablet (325 mg total) by mouth once daily., Disp: , Rfl:     fluconazole (DIFLUCAN) 200 MG Tab, Take 1 tablet (200 mg total) by mouth once daily. End 5/5/23, Disp: 30 tablet, Rfl: 0    fluticasone propionate (FLONASE) 50 mcg/actuation nasal spray, 2 sprays (100 mcg total) by Each Nostril route once daily., Disp: , Rfl: 0    miconazole (MICOTIN) 2 % cream, Per instructions 2 TIMES DAILY (route: topical), Disp: , Rfl:     miconazole nitrate 2% (MICOTIN) 2 % Oint, Apply topically 2 (two) times daily. Coloplast Clear Antifungal ointment- (green top)- nursing to apply to perineum, inner thighs, pannus, and buttocks BID, Disp: , Rfl: 0    pravastatin (PRAVACHOL) 40 MG tablet, Take 1 tablet (40 mg total) by mouth once daily., Disp: 90 tablet, Rfl: 3    propylene glycol (SYSTANE COMPLETE OPHT), Apply to eye., Disp: , Rfl:     silver sulfADIAZINE 1% (SILVADENE) 1 % cream, Apply to RLE skin breakdown twice daily (Patient taking differently: Apply topically 2 (two) times daily. Apply to RLE skin breakdown twice daily), Disp: , Rfl:     triamcinolone acetonide 0.025% (KENALOG) 0.025 % Oint, Per instructions DAILY (route: topical), Disp: , Rfl:     triamcinolone acetonide 0.1% (KENALOG) 0.1 % ointment, Apply topically 2 (two) times daily. Use to  affected areas for up to 2 weeks then take a 1 week break or decrease to 3 times weekly. Do not apply to groin or face. Apply to red scaly areas on the legs and arms, Disp: 454 g, Rfl: 1    vitamin E 400 UNIT capsule, Take 400 Units by mouth once daily., Disp: , Rfl:     empagliflozin (JARDIANCE) 10 mg tablet, 1/2 tablet daily, Disp: 30 tablet, Rfl: 11    furosemide (LASIX) 20 MG tablet, Take 2 tablets in the a.m. for weights 155-159, take 2 tablets twice a day for weights 160 over, none for weights less than 155, Disp: 100 tablet, Rfl: 11     Objective:   Review of Systems   Cardiovascular:  Negative for chest pain, claudication, cyanosis, dyspnea on exertion, irregular heartbeat, leg swelling, near-syncope, orthopnea, palpitations, paroxysmal nocturnal dyspnea and syncope.   Musculoskeletal:  Positive for arthritis and muscle cramps.   Gastrointestinal:  Positive for constipation and diarrhea.   Neurological:  Positive for headaches, loss of balance, numbness and paresthesias.     There were no vitals filed for this visit.        Physical Exam  Vitals reviewed.   Constitutional:       General: She is not in acute distress.     Appearance: She is well-developed.   HENT:      Head: Normocephalic and atraumatic.      Nose: Nose normal.   Eyes:      Conjunctiva/sclera: Conjunctivae normal.      Pupils: Pupils are equal, round, and reactive to light.   Neck:      Vascular: Hepatojugular reflux and JVD present. No carotid bruit.   Cardiovascular:      Rate and Rhythm: Normal rate and regular rhythm.      Pulses: Intact distal pulses.      Heart sounds: Normal heart sounds. No murmur heard.    No friction rub. No gallop.      Comments: Jugular venous pressure is normal.  No edema  Pulmonary:      Effort: Pulmonary effort is normal. No respiratory distress.      Breath sounds: Normal breath sounds. No wheezing or rales.   Chest:      Chest wall: No tenderness.   Abdominal:      General: Bowel sounds are normal. There is  no distension.      Palpations: Abdomen is soft.      Tenderness: There is no abdominal tenderness.   Musculoskeletal:         General: No tenderness or deformity. Normal range of motion.      Cervical back: Normal range of motion and neck supple.      Right lower leg: Edema (NonePositive edema to flank) present.      Left lower leg: Edema (positive edema to flank) present.      Comments: Both lower extremities below the knee wrapped   Skin:     General: Skin is warm and dry.      Findings: No erythema or rash.   Neurological:      Mental Status: She is alert and oriented to person, place, and time.      Cranial Nerves: No cranial nerve deficit.      Motor: No abnormal muscle tone.      Coordination: Coordination normal.   Psychiatric:         Behavior: Behavior normal.         Thought Content: Thought content normal.         Judgment: Judgment normal.        Assessment and Plan:     Acute heart failure with preserved ejection fraction  -     Basic Metabolic Panel; Standing  -     empagliflozin (JARDIANCE) 10 mg tablet; 1/2 tablet daily  Dispense: 30 tablet; Refill: 11  -     furosemide (LASIX) 20 MG tablet; Take 2 tablets in the a.m. for weights 155-159, take 2 tablets twice a day for weights 160 over, none for weights less than 155  Dispense: 100 tablet; Refill: 11    Acute on chronic diastolic heart failure    Atherosclerosis of aorta    Chronic a-fib    Presence of Watchman left atrial appendage closure device    Pure hypercholesterolemia    Primary hypertension    Venous stasis dermatitis of both lower extremities        Visit today for acute heart failure, somewhat improved overnight after additional furosemide I ordered.  She remains volume overloaded with 2+ edema into the flank.  Will adjust furosemide as sliding scale, add Jardiance, blood work next week, office visit in 2 weeks.    Follow up in about 2 weeks (around 6/8/2023).

## 2023-05-29 ENCOUNTER — HOSPITAL ENCOUNTER (OUTPATIENT)
Dept: WOUND CARE | Facility: HOSPITAL | Age: 88
Discharge: HOME | End: 2023-05-29
Attending: PREVENTIVE MEDICINE
Payer: MEDICARE

## 2023-05-29 VITALS
SYSTOLIC BLOOD PRESSURE: 133 MMHG | DIASTOLIC BLOOD PRESSURE: 63 MMHG | HEART RATE: 73 BPM | WEIGHT: 163 LBS | TEMPERATURE: 98 F | HEIGHT: 65 IN | BODY MASS INDEX: 27.16 KG/M2

## 2023-05-29 DIAGNOSIS — S81.801D MULTIPLE OPEN WOUNDS OF RIGHT LOWER EXTREMITY, SUBSEQUENT ENCOUNTER: ICD-10-CM

## 2023-05-29 DIAGNOSIS — L30.4 INTERTRIGO: ICD-10-CM

## 2023-05-29 DIAGNOSIS — I87.2 VENOUS STASIS DERMATITIS OF BOTH LOWER EXTREMITIES: Primary | ICD-10-CM

## 2023-05-29 DIAGNOSIS — S81.802D OPEN WOUND OF LEFT LOWER EXTREMITY, SUBSEQUENT ENCOUNTER: ICD-10-CM

## 2023-05-29 PROCEDURE — 99213 OFFICE O/P EST LOW 20 MIN: CPT

## 2023-05-29 RX ORDER — TRIAMCINOLONE ACETONIDE 1 MG/G
OINTMENT TOPICAL 2 TIMES DAILY
Qty: 454 G | Refills: 1 | Status: SHIPPED | OUTPATIENT
Start: 2023-05-29

## 2023-05-29 NOTE — PROGRESS NOTES
[facility]    Subjective:       Patient ID: Jenniffer Jimenez is a 89 y.o. female.    Chief Complaint: Wound Care    Follow up for her right lower leg wound, yeast infection to abdomen and groin.  No acute complaints.  Wound improvement noted by patient.    Review of Systems   All other systems reviewed and are negative.      Objective:     Vitals:    05/29/23 0928   BP: 133/63   Pulse: 73   Temp: 97.6 °F (36.4 °C)         Physical Exam       Altered Skin Integrity Right anterior;lower;lateral;medial;posterior Leg (Active)       Altered Skin Integrity Present on Admission - Did Patient arrive to the hospital with altered skin?: yes   Side: Right   Orientation: anterior;lower;lateral;medial;posterior   Location: Leg   Wound Number:    Is this injury device related?:    Primary Wound Type:    Description of Altered Skin Integrity:    Ankle-Brachial Index:    Pulses:    Removal Indication and Assessment:    Wound Outcome:    (Retired) Wound Length (cm):    (Retired) Wound Width (cm):    (Retired) Depth (cm):    Wound Description (Comments):    Removal Indications:    Wound Image    05/29/23 0900   Dressing Appearance Intact;Dried drainage 05/29/23 0900   Drainage Amount Scant 05/29/23 0900   Drainage Characteristics/Odor Serous 05/29/23 0900   Appearance Pink;Red;Moist 05/29/23 0900   Tissue loss description Partial thickness 05/29/23 0900   Red (%), Wound Tissue Color 100 % 05/29/23 0900   Periwound Area Dry;Redness;Satellite lesion 05/29/23 0900   Wound Edges Undefined 05/29/23 0900   Wound Length (cm) 3.5 cm 05/29/23 0900   Wound Width (cm) 5 cm 05/29/23 0900   Wound Depth (cm) 0.1 cm 05/29/23 0900   Wound Volume (cm^3) 1.75 cm^3 05/29/23 0900   Wound Surface Area (cm^2) 17.5 cm^2 05/29/23 0900   Care Cleansed with:;Soap and water;Sterile normal saline 05/29/23 0900   Dressing Applied;Rolled gauze;Tubular bandage;Non-adherent 05/29/23 0900   Periwound Care Topical  treatment applied 05/29/23 0900   Compression Tubular elasticized bandage 05/29/23 0900   Dressing Change Due 05/31/23 05/29/23 0900            Altered Skin Integrity Left lower;lateral Leg (Active)       Altered Skin Integrity Present on Admission - Did Patient arrive to the hospital with altered skin?: yes   Side: Left   Orientation: lower;lateral   Location: Leg   Wound Number:    Is this injury device related?:    Primary Wound Type:    Description of Altered Skin Integrity:    Ankle-Brachial Index:    Pulses:    Removal Indication and Assessment:    Wound Outcome:    (Retired) Wound Length (cm):    (Retired) Wound Width (cm):    (Retired) Depth (cm):    Wound Description (Comments):    Removal Indications:    Wound Image   05/29/23 0900   Dressing Appearance Dry;Intact;Clean 05/29/23 0900   Drainage Amount None 05/29/23 0900   Appearance Closed/resurfaced 05/29/23 0900   Tissue loss description Not applicable 05/29/23 0900   Periwound Area Dry;Intact 05/29/23 0900   Wound Edges Rolled/closed 05/29/23 0900   Wound Length (cm) 0 cm 05/29/23 0900   Wound Width (cm) 0 cm 05/29/23 0900   Wound Depth (cm) 0 cm 05/29/23 0900   Wound Volume (cm^3) 0 cm^3 05/29/23 0900   Wound Surface Area (cm^2) 0 cm^2 05/29/23 0900   Care Cleansed with:;Soap and water 05/29/23 0900   Dressing Applied;Tubular bandage 05/29/23 0900   Periwound Care Moisturizer applied 05/29/23 0900   Compression Tubular elasticized bandage 05/29/23 0900   Dressing Change Due 05/31/23 05/29/23 0900           Assessment/Plan:             ICD-10-CM ICD-9-CM   1. Venous stasis dermatitis of both lower extremities  I87.2 454.1   2. Multiple open wounds of right lower extremity, subsequent encounter  S81.801D V58.89     894.0   3. Open wound of left lower extremity, subsequent encounter  S81.802D V58.89     891.0   4. Intertrigo  L30.4 695.89           Tissue pathology and/or culture taken:  [] Yes [x] No   Sharp debridement performed:   [] Yes [x] No   Labs  ordered this visit:   [] Yes [x] No   Imaging ordered this visit:   [] Yes [x] No           Orders Placed This Encounter   Procedures    Change dressing     Right lower leg:   Cleanse wound with: Soap and water or Normal Saline   Lidocaine: PRN   Silver nitrate: PRN   Periwound care: Triamcinolone 0.1% to dry scaly areas at home, betamethasone in clinic  Primary dressing: Xeroform to right lower leg wound, then apply ABD pads   Secondary dressing: cast padding, then tubigrip E to bilateral legs. Lotion and Tubi  to LLE   Edema control: tubigrip E to bilateral legs toe to knees, elevate legs when sitting   Frequency: three times weekly and as needed     Abd and groin:   Apply antifungal cream, ABD pad as needed and intradry as needed     Follow-up: Dr. Cox 2 weeks     Home Health: Please continue wound care as above three times weekly and as needed except when the patient is in clinic.  Please arrange for bathing assistance three times weekly before dressings are changed, ok to take off leg bandages and wash legs in the shower with soap and water.    Ok for patient to wash legs in the shower with soap and water on the morning of dressing changes and leave open to air until dressing change performed.        Follow up in about 2 weeks (around 6/12/2023) for Dr Cox.

## 2023-06-02 ENCOUNTER — TELEPHONE (OUTPATIENT)
Dept: WOUND CARE | Facility: HOSPITAL | Age: 88
End: 2023-06-02
Payer: MEDICARE

## 2023-06-02 ENCOUNTER — OFFICE VISIT (OUTPATIENT)
Dept: PODIATRY | Facility: CLINIC | Age: 88
End: 2023-06-02
Payer: MEDICARE

## 2023-06-02 VITALS
HEIGHT: 65 IN | SYSTOLIC BLOOD PRESSURE: 127 MMHG | DIASTOLIC BLOOD PRESSURE: 62 MMHG | HEART RATE: 73 BPM | BODY MASS INDEX: 27.12 KG/M2

## 2023-06-02 DIAGNOSIS — L60.9 NAIL ABNORMALITIES: ICD-10-CM

## 2023-06-02 DIAGNOSIS — B35.1 ONYCHOMYCOSIS DUE TO DERMATOPHYTE: ICD-10-CM

## 2023-06-02 DIAGNOSIS — G60.9 IDIOPATHIC PERIPHERAL NEUROPATHY: Primary | ICD-10-CM

## 2023-06-02 PROCEDURE — 99999 PR PBB SHADOW E&M-EST. PATIENT-LVL IV: ICD-10-PCS | Mod: PBBFAC,,, | Performed by: STUDENT IN AN ORGANIZED HEALTH CARE EDUCATION/TRAINING PROGRAM

## 2023-06-02 PROCEDURE — 99214 OFFICE O/P EST MOD 30 MIN: CPT | Mod: 25,S$GLB,, | Performed by: STUDENT IN AN ORGANIZED HEALTH CARE EDUCATION/TRAINING PROGRAM

## 2023-06-02 PROCEDURE — 99214 PR OFFICE/OUTPT VISIT, EST, LEVL IV, 30-39 MIN: ICD-10-PCS | Mod: 25,S$GLB,, | Performed by: STUDENT IN AN ORGANIZED HEALTH CARE EDUCATION/TRAINING PROGRAM

## 2023-06-02 PROCEDURE — 99999 PR PBB SHADOW E&M-EST. PATIENT-LVL IV: CPT | Mod: PBBFAC,,, | Performed by: STUDENT IN AN ORGANIZED HEALTH CARE EDUCATION/TRAINING PROGRAM

## 2023-06-02 PROCEDURE — 1111F DSCHRG MED/CURRENT MED MERGE: CPT | Mod: CPTII,S$GLB,, | Performed by: STUDENT IN AN ORGANIZED HEALTH CARE EDUCATION/TRAINING PROGRAM

## 2023-06-02 PROCEDURE — 1159F MED LIST DOCD IN RCRD: CPT | Mod: CPTII,S$GLB,, | Performed by: STUDENT IN AN ORGANIZED HEALTH CARE EDUCATION/TRAINING PROGRAM

## 2023-06-02 PROCEDURE — 1126F AMNT PAIN NOTED NONE PRSNT: CPT | Mod: CPTII,S$GLB,, | Performed by: STUDENT IN AN ORGANIZED HEALTH CARE EDUCATION/TRAINING PROGRAM

## 2023-06-02 PROCEDURE — 1159F PR MEDICATION LIST DOCUMENTED IN MEDICAL RECORD: ICD-10-PCS | Mod: CPTII,S$GLB,, | Performed by: STUDENT IN AN ORGANIZED HEALTH CARE EDUCATION/TRAINING PROGRAM

## 2023-06-02 PROCEDURE — 1126F PR PAIN SEVERITY QUANTIFIED, NO PAIN PRESENT: ICD-10-PCS | Mod: CPTII,S$GLB,, | Performed by: STUDENT IN AN ORGANIZED HEALTH CARE EDUCATION/TRAINING PROGRAM

## 2023-06-02 PROCEDURE — 11721 ROUTINE FOOT CARE: ICD-10-PCS | Mod: Q9,S$GLB,, | Performed by: STUDENT IN AN ORGANIZED HEALTH CARE EDUCATION/TRAINING PROGRAM

## 2023-06-02 PROCEDURE — 11721 DEBRIDE NAIL 6 OR MORE: CPT | Mod: Q9,S$GLB,, | Performed by: STUDENT IN AN ORGANIZED HEALTH CARE EDUCATION/TRAINING PROGRAM

## 2023-06-02 PROCEDURE — 1111F PR DISCHARGE MEDS RECONCILED W/ CURRENT OUTPATIENT MED LIST: ICD-10-PCS | Mod: CPTII,S$GLB,, | Performed by: STUDENT IN AN ORGANIZED HEALTH CARE EDUCATION/TRAINING PROGRAM

## 2023-06-02 NOTE — PROGRESS NOTES
Subjective:      Patient ID: Jenniffer Jimenez is a 89 y.o. female.    Chief Complaint: Nail Problem (Pt stated she is only here to get her toenails cut, bilateral ingrown toenails) and Nail Care    Jenniffer is a 89 y.o. female who presents to the clinic for evaluation and treatment of high risk feet. Jenniffer has a past medical history of Amblyopia of left eye (04/10/2013), Arthritis, Atherosclerosis of aorta, Cataract, Central retinal vein occlusion of left eye, CKD (chronic kidney disease) stage 3, GFR 30-59 ml/min, Diabetes mellitus, type 2, Diabetic polyneuropathy (01/06/2022), Exotropia of both eyes (02/06/2013), Hearing loss, History of resection of small bowel, Hypertensive retinopathy of both eyes, Hypoglycemia, Macular degeneration, OA (osteoarthritis) of shoulder, Osteoporosis, Posterior vitreous detachment of both eyes, Psychiatric problem, Rhinitis, and TIA (transient ischemic attack). The patient's chief complaint is long, thick toenails. This patient has documented high risk feet requiring routine maintenance secondary to peripheral neuropathy.    6/2/23: Seen today for routine foot care, previously seen per Dr. Malloy. Relates to ingrown toenails to both great toes. No further pedal complaints.     PCP: Reed Ruiz MD    Date Last Seen by PCP:   Chief Complaint   Patient presents with    Nail Problem     Pt stated she is only here to get her toenails cut, bilateral ingrown toenails    Nail Care           Last encounter in this department: Visit date not found    Hemoglobin A1C   Date Value Ref Range Status   04/28/2023 5.9 (H) 4.0 - 5.6 % Final     Comment:     ADA Screening Guidelines:  5.7-6.4%  Consistent with prediabetes  >or=6.5%  Consistent with diabetes    High levels of fetal hemoglobin interfere with the HbA1C  assay. Heterozygous hemoglobin variants (HbS, HgC, etc)do  not significantly interfere with this assay.   However, presence of multiple variants may affect accuracy.     01/26/2023 6.0 (H)  4.0 - 5.6 % Final     Comment:     ADA Screening Guidelines:  5.7-6.4%  Consistent with prediabetes  >or=6.5%  Consistent with diabetes    High levels of fetal hemoglobin interfere with the HbA1C  assay. Heterozygous hemoglobin variants (HbS, HgC, etc)do  not significantly interfere with this assay.   However, presence of multiple variants may affect accuracy.     09/20/2022 6.0 (H) 4.0 - 5.6 % Final     Comment:     ADA Screening Guidelines:  5.7-6.4%  Consistent with prediabetes  >or=6.5%  Consistent with diabetes    High levels of fetal hemoglobin interfere with the HbA1C  assay. Heterozygous hemoglobin variants (HbS, HgC, etc)do  not significantly interfere with this assay.   However, presence of multiple variants may affect accuracy.         Review of Systems   Constitutional: Negative for chills, decreased appetite, diaphoresis and fever.   HENT:  Positive for hearing loss. Negative for congestion.    Cardiovascular:  Positive for leg swelling. Negative for chest pain, claudication and syncope.   Respiratory:  Negative for cough and shortness of breath.    Skin:  Positive for color change, dry skin, nail changes and poor wound healing. Negative for flushing, itching and rash.   Musculoskeletal:  Positive for arthritis. Negative for back pain, joint pain, joint swelling and myalgias.   Gastrointestinal:  Negative for nausea and vomiting.   Neurological:  Positive for numbness and paresthesias. Negative for focal weakness, loss of balance and weakness.   Psychiatric/Behavioral:  Negative for altered mental status. The patient is not nervous/anxious.          Objective:      Physical Exam  Vitals reviewed.   Constitutional:       General: She is not in acute distress.     Appearance: She is well-developed. She is not diaphoretic.   Cardiovascular:      Comments: Dorsalis pedis and posterior tibial pulses are diminished Bilaterally. Toes are cool to touch. Feet are warm proximally.There is decreased digital hair.  Skin is atrophic, slightly hyperpigmented, and mildly edematous      Musculoskeletal:         General: Normal range of motion.      Comments: Adequate joint range of motion without pain, limitation, nor crepitation Bilateral feet and ankle joints. Muscle strength is 5/5 in all groups bilaterally.         Lymphadenopathy:      Comments: Negative lymphangitic streaking    Skin:     General: Skin is warm and dry.      Findings: No ecchymosis, erythema or lesion.      Comments: Nails x10 are elongated by  2-5mm's, thickened by 2-5 mm's, dystrophic, and are darkened in  coloration . Xerosis Bilaterally. No open lesions noted.    Cryptotic distal borders to bilateral hallux toenail, no associated open wounds or signs of infection    B/L lower extremity leg dressings noted . CDI   Neurological:      Mental Status: She is alert and oriented to person, place, and time.      Sensory: Sensory deficit present.      Motor: No abnormal muscle tone.      Comments: [place] 5.07 monofilamant testing is diminished Senthil feet. Sharp/dull sensation diminished Bilaterally. Light touch absent Bilaterally.       Psychiatric:         Behavior: Behavior normal.         Thought Content: Thought content normal.         Judgment: Judgment normal.             Assessment:       Encounter Diagnoses   Name Primary?    Nail abnormalities     Idiopathic peripheral neuropathy Yes    Onychomycosis due to dermatophyte          Plan:       Jenniffer was seen today for nail problem and nail care.    Diagnoses and all orders for this visit:    Idiopathic peripheral neuropathy  -     Routine Foot Care    Nail abnormalities  -     Ambulatory referral/consult to Podiatry  -     Routine Foot Care    Onychomycosis due to dermatophyte  -     Routine Foot Care      I counseled the patient on her conditions, their implications and medical management.    - Shoe inspection. Patient instructed on proper foot hygeine. We discussed wearing proper shoe gear, daily  foot inspections, never walking without protective shoe gear, never putting sharp instruments to feet, routine podiatric nail visits every 2-3 months.      - With patient's permission, nails were aggressively reduced and debrided x 10 to their soft tissue attachment mechanically and with electric , removing all offending nail and debris. Patient relates relief following the procedure. She will continue to monitor the areas daily, inspect her feet, wear protective shoe gear when ambulatory, moisturizer to maintain skin integrity and follow in this office in approximately 2-3 months, sooner p.r.n.    -Utilizing sterile toenail clippers I aggressively trimmed  the offending the above mentioned cryptotic  nail border approximately 3 mm from its edge and carried the nail plate incision down at an angle in order to wedge out the offending cryptotic portion of the nail plate. The offending border was then removed in toto. No blood was drawn. Patient tolerated the procedure well and related significant relief.     - Rx topical antifungal [facility]     - Patient is currently under a [facility]  Since patient is already under treatment plan and had her legs dressed by home health care I did not evaluate her bilateral lower extremities.

## 2023-06-02 NOTE — TELEPHONE ENCOUNTER
Received call from Paul with [facility] that the insurance would only cover twice weekly visits due to decreased drainage and wound healing.  Attempted to call and inform patient, left v/mail requesting callback.  ---  Patient returned call, informed patient that her insurance would only cover two home health visits weekly as her drainage and wounds had improved, patient verbalized understanding.

## 2023-06-02 NOTE — PROCEDURES
"Routine Foot Care    Date/Time: 6/2/2023 9:15 AM  Performed by: Hilda Vences DPM  Authorized by: Hilda Vences DPM     Time out: Immediately prior to procedure a "time out" was called to verify the correct patient, procedure, equipment, support staff and site/side marked as required.    Consent Done?:  Yes (Verbal)  Hyperkeratotic Skin Lesions?: No      Nail Care Type:  Debride  Location(s): All  (Left 1st Toe, Left 3rd Toe, Left 2nd Toe, Left 4th Toe, Left 5th Toe, Right 1st Toe, Right 2nd Toe, Right 3rd Toe, Right 4th Toe and Right 5th Toe)  Patient tolerance:  Patient tolerated the procedure well with no immediate complications  "

## 2023-06-09 ENCOUNTER — DOCUMENT SCAN (OUTPATIENT)
Dept: HOME HEALTH SERVICES | Facility: HOSPITAL | Age: 88
End: 2023-06-09
Payer: MEDICARE

## 2023-06-12 ENCOUNTER — HOSPITAL ENCOUNTER (OUTPATIENT)
Dept: WOUND CARE | Facility: HOSPITAL | Age: 88
Discharge: HOME | End: 2023-06-12
Attending: PREVENTIVE MEDICINE
Payer: MEDICARE

## 2023-06-12 VITALS
BODY MASS INDEX: 27.16 KG/M2 | DIASTOLIC BLOOD PRESSURE: 59 MMHG | HEART RATE: 77 BPM | SYSTOLIC BLOOD PRESSURE: 130 MMHG | WEIGHT: 163 LBS | HEIGHT: 65 IN | TEMPERATURE: 97 F

## 2023-06-12 DIAGNOSIS — L30.4 INTERTRIGO: ICD-10-CM

## 2023-06-12 DIAGNOSIS — S81.801D MULTIPLE OPEN WOUNDS OF RIGHT LOWER EXTREMITY, SUBSEQUENT ENCOUNTER: ICD-10-CM

## 2023-06-12 DIAGNOSIS — S81.802D OPEN WOUND OF LEFT LOWER EXTREMITY, SUBSEQUENT ENCOUNTER: ICD-10-CM

## 2023-06-12 DIAGNOSIS — I87.2 VENOUS STASIS DERMATITIS OF BOTH LOWER EXTREMITIES: Primary | ICD-10-CM

## 2023-06-12 PROCEDURE — 99213 OFFICE O/P EST LOW 20 MIN: CPT

## 2023-06-12 NOTE — PROGRESS NOTES
[facility]    Subjective:       Patient ID: Jenniffer Jimenez is a 89 y.o. female.    Chief Complaint: Non-healing Wound Follow Up    6/12/23 Patient seen for RLE venous ulcers.  Dry scaly patches no open areas presented.  Persistent edema BLE, R>L.  Tolerated dressing application well.  New orders routed to home health.  Review of Systems   All other systems reviewed and are negative.      Objective:     Vitals:    06/12/23 0905   BP: (!) 130/59   Pulse: 77   Temp: 96.5 °F (35.8 °C)         Physical Exam       Altered Skin Integrity Right anterior;lower;lateral;medial;posterior Leg (Active)       Altered Skin Integrity Present on Admission - Did Patient arrive to the hospital with altered skin?: yes   Side: Right   Orientation: anterior;lower;lateral;medial;posterior   Location: Leg   Wound Number:    Is this injury device related?:    Primary Wound Type:    Description of Altered Skin Integrity:    Ankle-Brachial Index:    Pulses:    Removal Indication and Assessment:    Wound Outcome:    (Retired) Wound Length (cm):    (Retired) Wound Width (cm):    (Retired) Depth (cm):    Wound Description (Comments):    Removal Indications:    Wound Image    06/12/23 0938   Dressing Appearance Open to air 06/12/23 0938   Drainage Amount Scant 06/12/23 0938   Drainage Characteristics/Odor Sanguineous 06/12/23 0938   Periwound Area Intact;Dry;Edematous 06/12/23 0938   Wound Edges Irregular;Undefined 06/12/23 0938   Wound Length (cm) 6.5 cm 06/12/23 0938   Wound Width (cm) 4.5 cm 06/12/23 0938   Wound Depth (cm) 0 cm 06/12/23 0938   Wound Volume (cm^3) 0 cm^3 06/12/23 0938   Wound Surface Area (cm^2) 29.25 cm^2 06/12/23 0938   Care Cleansed with:;Soap and water;Sterile normal saline 06/12/23 0938   Dressing Applied;Non-adherent;Absorptive Pad 06/12/23 0938   Compression Tubular elasticized bandage 06/12/23 0938   Dressing Change Due 06/15/23 06/12/23 0938            Altered  Skin Integrity Left lower;lateral Leg (Active)       Altered Skin Integrity Present on Admission - Did Patient arrive to the hospital with altered skin?: yes   Side: Left   Orientation: lower;lateral   Location: Leg   Wound Number:    Is this injury device related?:    Primary Wound Type:    Description of Altered Skin Integrity:    Ankle-Brachial Index:    Pulses:    Removal Indication and Assessment:    Wound Outcome:    (Retired) Wound Length (cm):    (Retired) Wound Width (cm):    (Retired) Depth (cm):    Wound Description (Comments):    Removal Indications:    Wound Image   06/12/23 0938   Description of Altered Skin Integrity Intact skin with non-blanchable redness of localized area 06/12/23 0938   Dressing Appearance Open to air 06/12/23 0938   Drainage Amount None 06/12/23 0938   Tissue loss description Not applicable 06/12/23 0938   Periwound Area Intact;Dry;Edematous 06/12/23 0938   Wound Length (cm) 0 cm 06/12/23 0938   Wound Width (cm) 0 cm 06/12/23 0938   Wound Depth (cm) 0 cm 06/12/23 0938   Wound Volume (cm^3) 0 cm^3 06/12/23 0938   Wound Surface Area (cm^2) 0 cm^2 06/12/23 0938   Care Cleansed with:;Soap and water 06/12/23 0938   Periwound Care Moisturizer applied 06/12/23 0938   Compression Tubular elasticized bandage 06/12/23 0938   Dressing Change Due 06/15/23 06/12/23 0938         Assessment/Plan:         ICD-10-CM ICD-9-CM   1. Venous stasis dermatitis of both lower extremities  I87.2 454.1   2. Multiple open wounds of right lower extremity, subsequent encounter  S81.801D V58.89     894.0   3. Open wound of left lower extremity, subsequent encounter  S81.802D V58.89     891.0   4. Intertrigo  L30.4 695.89           Tissue pathology and/or culture taken:  [] Yes [x] No   Sharp debridement performed:   [] Yes [x] No   Labs ordered this visit:   [] Yes [x] No   Imaging ordered this visit:   [] Yes [x] No           Orders Placed This Encounter   Procedures    Change dressing     Right lower leg:    Cleanse wound with: Soap and water or Normal Saline   Lidocaine: PRN   Silver nitrate: PRN   Periwound care: Triamcinolone 0.1% to dry scaly areas at home, betamethasone in clinic   Primary dressing: Xeroform to right lower leg wound, then apply ABD pad   Secondary dressing: cast padding, then tubigrip E to bilateral legs. Lotion and Tubi  to LLE   Edema control: tubigrip E to bilateral legs toe to knees, elevate legs when sitting   Frequency: 2 x weekly and as needed     Abd and groin:   Apply antifungal cream, ABD pad as needed and intradry as needed     Follow-up: Dr. Cox 2 weeks     Home Health: Please continue wound care as above.  Please arrange for bathing assistance before dressings are changed, ok to take off leg bandages and wash legs in the shower with soap and water.     Ok for patient to wash legs in the shower with soap and water on the morning of dressing changes and leave open to air until dressing change performed.        Follow up in about 2 weeks (around 6/26/2023) for Dr Cox.

## 2023-06-13 ENCOUNTER — DOCUMENT SCAN (OUTPATIENT)
Dept: HOME HEALTH SERVICES | Facility: HOSPITAL | Age: 88
End: 2023-06-13
Payer: MEDICARE

## 2023-06-14 ENCOUNTER — DOCUMENT SCAN (OUTPATIENT)
Dept: HOME HEALTH SERVICES | Facility: HOSPITAL | Age: 88
End: 2023-06-14
Payer: MEDICARE

## 2023-06-14 ENCOUNTER — PATIENT MESSAGE (OUTPATIENT)
Dept: PODIATRY | Facility: CLINIC | Age: 88
End: 2023-06-14
Payer: MEDICARE

## 2023-06-21 ENCOUNTER — TELEPHONE (OUTPATIENT)
Dept: AUDIOLOGY | Facility: CLINIC | Age: 88
End: 2023-06-21
Payer: MEDICARE

## 2023-06-22 ENCOUNTER — EXTERNAL HOME HEALTH (OUTPATIENT)
Dept: HOME HEALTH SERVICES | Facility: HOSPITAL | Age: 88
End: 2023-06-22
Payer: MEDICARE

## 2023-06-26 ENCOUNTER — HOSPITAL ENCOUNTER (OUTPATIENT)
Dept: WOUND CARE | Facility: HOSPITAL | Age: 88
Discharge: HOME | End: 2023-06-26
Attending: PREVENTIVE MEDICINE
Payer: MEDICARE

## 2023-06-26 VITALS
HEIGHT: 65 IN | HEART RATE: 79 BPM | BODY MASS INDEX: 27.16 KG/M2 | DIASTOLIC BLOOD PRESSURE: 71 MMHG | SYSTOLIC BLOOD PRESSURE: 158 MMHG | TEMPERATURE: 97 F | WEIGHT: 163 LBS

## 2023-06-26 DIAGNOSIS — I87.2 VENOUS INSUFFICIENCY OF BOTH LOWER EXTREMITIES: ICD-10-CM

## 2023-06-26 DIAGNOSIS — I87.2 VENOUS STASIS DERMATITIS OF BOTH LOWER EXTREMITIES: Primary | ICD-10-CM

## 2023-06-26 DIAGNOSIS — S81.801D MULTIPLE OPEN WOUNDS OF RIGHT LOWER EXTREMITY, SUBSEQUENT ENCOUNTER: ICD-10-CM

## 2023-06-26 DIAGNOSIS — S81.802D OPEN WOUND OF LEFT LOWER EXTREMITY, SUBSEQUENT ENCOUNTER: ICD-10-CM

## 2023-06-26 PROCEDURE — 99212 OFFICE O/P EST SF 10 MIN: CPT

## 2023-06-26 RX ORDER — CLOTRIMAZOLE AND BETAMETHASONE DIPROPIONATE 10; .64 MG/G; MG/G
CREAM TOPICAL DAILY
Qty: 45 G | Refills: 1 | Status: SHIPPED | OUTPATIENT
Start: 2023-06-26 | End: 2023-08-07 | Stop reason: SDUPTHER

## 2023-06-26 NOTE — PROGRESS NOTES
[facility]    Subjective:       Patient ID: Jenniffer Jimenez is a 89 y.o. female.    Chief Complaint: Venous Stasis    Patient seen for RLE venous ulcers.  Rash to RLE presented.  Patient and daughter educated on need to obtain own compression stocking or Tubigrips E . Verbalized understanding.   Review of Systems   All other systems reviewed and are negative.      Objective:     Vitals:    06/26/23 1022   BP: (!) 158/71   Pulse: 79   Temp: 97 °F (36.1 °C)         Physical Exam       Altered Skin Integrity Right anterior;lower;lateral;medial;posterior Leg (Active)       Altered Skin Integrity Present on Admission - Did Patient arrive to the hospital with altered skin?: yes   Side: Right   Orientation: anterior;lower;lateral;medial;posterior   Location: Leg   Wound Number:    Is this injury device related?:    Primary Wound Type:    Description of Altered Skin Integrity:    Ankle-Brachial Index:    Pulses:    Removal Indication and Assessment:    Wound Outcome:    (Retired) Wound Length (cm):    (Retired) Wound Width (cm):    (Retired) Depth (cm):    Wound Description (Comments):    Removal Indications:    Wound Image   06/26/23 1027   Dressing Appearance Open to air 06/26/23 1027   Drainage Amount None 06/26/23 1027   Red (%), Wound Tissue Color 100 % 06/26/23 1027   Periwound Area Other (see comments);Dry;Intact 06/26/23 1027   Care Cleansed with:;Sterile normal saline 06/26/23 1027   Dressing Applied;Other (comment) 06/26/23 1027   Compression Tubular elasticized bandage 06/26/23 1027   Dressing Change Due 06/27/23 06/26/23 1027       [REMOVED]      Altered Skin Integrity Left lower;lateral Leg (Removed)       Altered Skin Integrity Present on Admission - Did Patient arrive to the hospital with altered skin?: yes   Side: Left   Orientation: lower;lateral   Location: Leg   Wound Number:    Is this injury device related?:    Primary Wound Type:    Description of  Altered Skin Integrity:    Ankle-Brachial Index:    Pulses:    Removal Indication and Assessment:    Wound Outcome: Healed   (Retired) Wound Length (cm):    (Retired) Wound Width (cm):    (Retired) Depth (cm):    Wound Description (Comments):    Removal Indications:    Removed 06/26/23 1028   Wound Image   06/26/23 1027   Dressing Appearance Open to air 06/26/23 1027   Drainage Amount None 06/26/23 1027         Assessment/Plan:         ICD-10-CM ICD-9-CM   1. Venous stasis dermatitis of both lower extremities  I87.2 454.1   2. Venous insufficiency of both lower extremities  I87.2 459.81   3. Multiple open wounds of right lower extremity, subsequent encounter  S81.801D V58.89     894.0   4. Open wound of left lower extremity, subsequent encounter  S81.802D V58.89     891.0           Tissue pathology and/or culture taken:  [] Yes [x] No   Sharp debridement performed:   [] Yes [x] No   Labs ordered this visit:   [] Yes [x] No   Imaging ordered this visit:   [] Yes [x] No           Orders Placed This Encounter   Procedures    Change dressing     BLE    Cleanse with: Soap and water   Periwound care: Triamcinolone 0.1% to dry scaly areas RLE at home, betamethasone in clinic   Edema control: tubigrip E to bilateral legs toe to knees, on am, off hs; elevate legs when sitting   Frequency: daily  Follow-up: Dr. Cox 3 weeks     Patient may use own compression stocking 15 mmHg.    Ok for patient to take showers.        Follow up in about 3 weeks (around 7/17/2023) for DR Cox.

## 2023-06-30 ENCOUNTER — OFFICE VISIT (OUTPATIENT)
Dept: INTERNAL MEDICINE | Facility: CLINIC | Age: 88
End: 2023-06-30
Payer: MEDICARE

## 2023-06-30 ENCOUNTER — TELEPHONE (OUTPATIENT)
Dept: INTERNAL MEDICINE | Facility: CLINIC | Age: 88
End: 2023-06-30

## 2023-06-30 VITALS
SYSTOLIC BLOOD PRESSURE: 116 MMHG | DIASTOLIC BLOOD PRESSURE: 62 MMHG | WEIGHT: 170.63 LBS | HEART RATE: 78 BPM | BODY MASS INDEX: 28.43 KG/M2 | HEIGHT: 65 IN | OXYGEN SATURATION: 97 %

## 2023-06-30 DIAGNOSIS — L72.3 SEBACEOUS CYST OF AXILLA: Primary | ICD-10-CM

## 2023-06-30 PROCEDURE — 1160F PR REVIEW ALL MEDS BY PRESCRIBER/CLIN PHARMACIST DOCUMENTED: ICD-10-PCS | Mod: CPTII,S$GLB,, | Performed by: PHYSICIAN ASSISTANT

## 2023-06-30 PROCEDURE — 3288F PR FALLS RISK ASSESSMENT DOCUMENTED: ICD-10-PCS | Mod: CPTII,S$GLB,, | Performed by: PHYSICIAN ASSISTANT

## 2023-06-30 PROCEDURE — 1160F RVW MEDS BY RX/DR IN RCRD: CPT | Mod: CPTII,S$GLB,, | Performed by: PHYSICIAN ASSISTANT

## 2023-06-30 PROCEDURE — 1159F PR MEDICATION LIST DOCUMENTED IN MEDICAL RECORD: ICD-10-PCS | Mod: CPTII,S$GLB,, | Performed by: PHYSICIAN ASSISTANT

## 2023-06-30 PROCEDURE — 1101F PR PT FALLS ASSESS DOC 0-1 FALLS W/OUT INJ PAST YR: ICD-10-PCS | Mod: CPTII,S$GLB,, | Performed by: PHYSICIAN ASSISTANT

## 2023-06-30 PROCEDURE — 99999 PR PBB SHADOW E&M-EST. PATIENT-LVL V: CPT | Mod: PBBFAC,,, | Performed by: PHYSICIAN ASSISTANT

## 2023-06-30 PROCEDURE — 99999 PR PBB SHADOW E&M-EST. PATIENT-LVL V: ICD-10-PCS | Mod: PBBFAC,,, | Performed by: PHYSICIAN ASSISTANT

## 2023-06-30 PROCEDURE — 3288F FALL RISK ASSESSMENT DOCD: CPT | Mod: CPTII,S$GLB,, | Performed by: PHYSICIAN ASSISTANT

## 2023-06-30 PROCEDURE — 1159F MED LIST DOCD IN RCRD: CPT | Mod: CPTII,S$GLB,, | Performed by: PHYSICIAN ASSISTANT

## 2023-06-30 PROCEDURE — 1101F PT FALLS ASSESS-DOCD LE1/YR: CPT | Mod: CPTII,S$GLB,, | Performed by: PHYSICIAN ASSISTANT

## 2023-06-30 PROCEDURE — 99213 PR OFFICE/OUTPT VISIT, EST, LEVL III, 20-29 MIN: ICD-10-PCS | Mod: S$GLB,,, | Performed by: PHYSICIAN ASSISTANT

## 2023-06-30 PROCEDURE — 1126F PR PAIN SEVERITY QUANTIFIED, NO PAIN PRESENT: ICD-10-PCS | Mod: CPTII,S$GLB,, | Performed by: PHYSICIAN ASSISTANT

## 2023-06-30 PROCEDURE — 99213 OFFICE O/P EST LOW 20 MIN: CPT | Mod: S$GLB,,, | Performed by: PHYSICIAN ASSISTANT

## 2023-06-30 PROCEDURE — 1126F AMNT PAIN NOTED NONE PRSNT: CPT | Mod: CPTII,S$GLB,, | Performed by: PHYSICIAN ASSISTANT

## 2023-06-30 RX ORDER — CLINDAMYCIN PHOSPHATE 10 UG/ML
LOTION TOPICAL 2 TIMES DAILY
Qty: 60 ML | Refills: 0 | Status: SHIPPED | OUTPATIENT
Start: 2023-06-30 | End: 2023-06-30

## 2023-06-30 RX ORDER — MUPIROCIN 20 MG/G
OINTMENT TOPICAL 3 TIMES DAILY
Qty: 30 G | Refills: 0 | OUTPATIENT
Start: 2023-06-30 | End: 2024-01-12

## 2023-06-30 NOTE — PATIENT INSTRUCTIONS
Warm compresses    Antibiotic cream twice a day    Let us know if your symptoms do not improve, if so we will arrange follow up in Dermatology or general surgeon.

## 2023-06-30 NOTE — TELEPHONE ENCOUNTER
----- Message from Yamini Madrigal sent at 6/30/2023 11:59 AM CDT -----  Contact: self [phone]  Pt stated the med that was ordered is $88 too expensive need alternative.    Please call and advise

## 2023-06-30 NOTE — PROGRESS NOTES
"Subjective     Patient ID: Jenniffer Jimenez is a 89 y.o. female.    Chief Complaint: Mass    HPI    Established pt of Reed Ruiz MD (new to me)    Same day/urgent care appt.     Pt here with concerns of lump under left arm.   Noticed it yesterday. Mildly tender.      Past Medical History:   Diagnosis Date    Amblyopia of left eye 04/10/2013    Arthritis     Facet arthropathy, Lumbosacral    Atherosclerosis of aorta     Cataract     Central retinal vein occlusion of left eye     CKD (chronic kidney disease) stage 3, GFR 30-59 ml/min     Diabetes mellitus, type 2     Diabetic polyneuropathy 2022    Exotropia of both eyes 2013    recession RSR 5.0mm w/ adj; recession LR os 5.0 w/ adj; resect MR os  4.0mm    Hearing loss     History of resection of small bowel     Hypertensive retinopathy of both eyes     Hypoglycemia     Macular degeneration     OA (osteoarthritis) of shoulder     Right    Osteoporosis     Posterior vitreous detachment of both eyes     Psychiatric problem     Rhinitis     TIA (transient ischemic attack)      Social History     Tobacco Use    Smoking status: Former     Types: Cigarettes     Quit date: 10/29/1982     Years since quittin.6     Passive exposure: Never    Smokeless tobacco: Never   Substance Use Topics    Alcohol use: Not Currently     Alcohol/week: 2.0 standard drinks     Types: 2 Shots of liquor per week     Comment: rare    Drug use: No     Review of patient's allergies indicates:   Allergen Reactions    Opioids - morphine analogues Other (See Comments)     Bowel issues; bowel obstruction    Tizanidine Other (See Comments)     "Lips were numb,  Almost passed out."    Tramadol Hallucinations    Beta-blockers (beta-adrenergic blocking agts) Other (See Comments)     Can not go on beta blockers for long period of time - due to taking allergy injections    Morphine     Opioids-meperidine and related     Ciprofloxacin Rash       Review of Systems   Constitutional:  Negative " "for chills, diaphoresis and fever.   Integumentary:  Negative for rash.        Objective  /62 (BP Location: Right arm, Patient Position: Sitting, BP Method: Medium (Manual))   Pulse 78   Ht 5' 5" (1.651 m)   Wt 77.4 kg (170 lb 10.2 oz)   LMP  (LMP Unknown)   SpO2 97%   BMI 28.40 kg/m²       Physical Exam  Constitutional:       General: She is not in acute distress.     Appearance: She is not ill-appearing.   Cardiovascular:      Rate and Rhythm: Normal rate and regular rhythm.   Pulmonary:      Effort: Pulmonary effort is normal. No respiratory distress.   Neurological:      Mental Status: She is alert.       Approx 1cm cyst with 2 scant pustule, mild tender, no warmth or active drainage  No significant fluctuance/induration               Assessment and Plan     1. Sebaceous cyst of axilla  -     clindamycin (CLEOCIN T) 1 % lotion; Apply topically 2 (two) times daily.  Dispense: 60 mL; Refill: 0      Patient Instructions   Warm compresses    Antibiotic cream twice a day    Let us know if your symptoms do not improve, if so we will arrange follow up in Dermatology or general surgeon.     Future Appointments   Date Time Provider Department Center   7/12/2023 11:40 AM Brent Chapman Jr., MD [facility] CARDIO [place]   7/17/2023 11:00 AM Cody Cox MD [facility] WOUND [facility]   7/21/2023 10:20 AM Reed Ruiz MD [facility] IM [place]   9/5/2023 10:30 AM Hilda Vences DPM [facility] PODIAT [facility]       Jayna Dalton PA-C  "

## 2023-07-18 ENCOUNTER — CLINICAL SUPPORT (OUTPATIENT)
Dept: AUDIOLOGY | Facility: CLINIC | Age: 88
End: 2023-07-18
Payer: MEDICARE

## 2023-07-18 DIAGNOSIS — H90.A22 SENSORINEURAL HEARING LOSS (SNHL) OF LEFT EAR WITH RESTRICTED HEARING OF RIGHT EAR: Primary | ICD-10-CM

## 2023-07-18 PROCEDURE — 99499 UNLISTED E&M SERVICE: CPT | Mod: HCNC,S$GLB,, | Performed by: AUDIOLOGIST

## 2023-07-18 PROCEDURE — 99499 NO LOS: ICD-10-PCS | Mod: HCNC,S$GLB,, | Performed by: AUDIOLOGIST

## 2023-07-18 NOTE — PROGRESS NOTES
Hearing Aid Follow-up:    Ms. Abad was seen today for a hearing aid follow-up appointment and to be fit with her new cshell for her left ear. Ms. Abad's hearing aids were cleaned and checked. We reviewed how to properly clean the hearing aids and change the wax filters. Ms. Abad reported her new cshell fit comfortably. Overall gain was increased to 100% target gain. Ms. Abad reported improved sound quality and clarity. Ms. Abad will follow-up in 6 months or sooner if needed.     Hearing Aid Information:  Phonak L30-RL   Sand Beige   LT SN: [id number]   RT SN: [id number]   Warranty Exp:  06/11/2026     cShell 4.0 Acryl   Lt SN:  [id number]   ACC: [id number]   RT SN: [id number]   ACC: [id number]   Tube Length: 1   : Power   Warranty Exp  06/13/2023

## 2023-07-21 ENCOUNTER — OFFICE VISIT (OUTPATIENT)
Dept: INTERNAL MEDICINE | Facility: CLINIC | Age: 88
End: 2023-07-21
Payer: MEDICARE

## 2023-07-21 ENCOUNTER — LAB VISIT (OUTPATIENT)
Dept: LAB | Facility: HOSPITAL | Age: 88
End: 2023-07-21
Attending: INTERNAL MEDICINE
Payer: MEDICARE

## 2023-07-21 VITALS
WEIGHT: 164.88 LBS | BODY MASS INDEX: 27.47 KG/M2 | TEMPERATURE: 97 F | OXYGEN SATURATION: 99 % | SYSTOLIC BLOOD PRESSURE: 120 MMHG | HEIGHT: 65 IN | DIASTOLIC BLOOD PRESSURE: 60 MMHG | HEART RATE: 105 BPM

## 2023-07-21 DIAGNOSIS — N18.32 STAGE 3B CHRONIC KIDNEY DISEASE: ICD-10-CM

## 2023-07-21 DIAGNOSIS — Z99.89 USES WALKER: ICD-10-CM

## 2023-07-21 DIAGNOSIS — I10 PRIMARY HYPERTENSION: Chronic | ICD-10-CM

## 2023-07-21 DIAGNOSIS — M81.6 LOCALIZED OSTEOPOROSIS, UNSPECIFIED PATHOLOGICAL FRACTURE PRESENCE: ICD-10-CM

## 2023-07-21 DIAGNOSIS — I50.31 ACUTE HEART FAILURE WITH PRESERVED EJECTION FRACTION: Chronic | ICD-10-CM

## 2023-07-21 DIAGNOSIS — Z00.00 ANNUAL PHYSICAL EXAM: Primary | ICD-10-CM

## 2023-07-21 DIAGNOSIS — I70.0 ATHEROSCLEROSIS OF AORTA: Chronic | ICD-10-CM

## 2023-07-21 DIAGNOSIS — E78.00 PURE HYPERCHOLESTEROLEMIA: Chronic | ICD-10-CM

## 2023-07-21 DIAGNOSIS — I48.20 CHRONIC A-FIB: Chronic | ICD-10-CM

## 2023-07-21 LAB
ALBUMIN SERPL BCP-MCNC: 4 G/DL (ref 3.5–5.2)
ALP SERPL-CCNC: 132 U/L (ref 55–135)
ALT SERPL W/O P-5'-P-CCNC: 27 U/L (ref 10–44)
ANION GAP SERPL CALC-SCNC: 12 MMOL/L (ref 8–16)
AST SERPL-CCNC: 21 U/L (ref 10–40)
BASOPHILS # BLD AUTO: 0.03 K/UL (ref 0–0.2)
BASOPHILS NFR BLD: 0.7 % (ref 0–1.9)
BILIRUB SERPL-MCNC: 0.6 MG/DL (ref 0.1–1)
BUN SERPL-MCNC: 37 MG/DL (ref 10–30)
CALCIUM SERPL-MCNC: 9 MG/DL (ref 8.7–10.5)
CHLORIDE SERPL-SCNC: 101 MMOL/L (ref 95–110)
CHOLEST SERPL-MCNC: 170 MG/DL (ref 120–199)
CHOLEST/HDLC SERPL: 2.6 {RATIO} (ref 2–5)
CO2 SERPL-SCNC: 26 MMOL/L (ref 23–29)
CREAT SERPL-MCNC: 1.1 MG/DL (ref 0.5–1.4)
DIFFERENTIAL METHOD: ABNORMAL
EOSINOPHIL # BLD AUTO: 0.1 K/UL (ref 0–0.5)
EOSINOPHIL NFR BLD: 1.7 % (ref 0–8)
ERYTHROCYTE [DISTWIDTH] IN BLOOD BY AUTOMATED COUNT: 17 % (ref 11.5–14.5)
EST. GFR  (NO RACE VARIABLE): 47.4 ML/MIN/1.73 M^2
GLUCOSE SERPL-MCNC: 107 MG/DL (ref 70–110)
HCT VFR BLD AUTO: 31 % (ref 37–48.5)
HDLC SERPL-MCNC: 65 MG/DL (ref 40–75)
HDLC SERPL: 38.2 % (ref 20–50)
HGB BLD-MCNC: 9.9 G/DL (ref 12–16)
IMM GRANULOCYTES # BLD AUTO: 0.03 K/UL (ref 0–0.04)
IMM GRANULOCYTES NFR BLD AUTO: 0.7 % (ref 0–0.5)
LDLC SERPL CALC-MCNC: 93 MG/DL (ref 63–159)
LYMPHOCYTES # BLD AUTO: 1 K/UL (ref 1–4.8)
LYMPHOCYTES NFR BLD: 20.9 % (ref 18–48)
MCH RBC QN AUTO: 26.8 PG (ref 27–31)
MCHC RBC AUTO-ENTMCNC: 31.9 G/DL (ref 32–36)
MCV RBC AUTO: 84 FL (ref 82–98)
MONOCYTES # BLD AUTO: 0.4 K/UL (ref 0.3–1)
MONOCYTES NFR BLD: 9.1 % (ref 4–15)
NEUTROPHILS # BLD AUTO: 3.1 K/UL (ref 1.8–7.7)
NEUTROPHILS NFR BLD: 66.9 % (ref 38–73)
NONHDLC SERPL-MCNC: 105 MG/DL
NRBC BLD-RTO: 0 /100 WBC
PLATELET # BLD AUTO: 179 K/UL (ref 150–450)
PMV BLD AUTO: 12.2 FL (ref 9.2–12.9)
POTASSIUM SERPL-SCNC: 3.9 MMOL/L (ref 3.5–5.1)
PROT SERPL-MCNC: 7.4 G/DL (ref 6–8.4)
RBC # BLD AUTO: 3.69 M/UL (ref 4–5.4)
SODIUM SERPL-SCNC: 139 MMOL/L (ref 136–145)
TRIGL SERPL-MCNC: 60 MG/DL (ref 30–150)
TSH SERPL DL<=0.005 MIU/L-ACNC: 3.09 UIU/ML (ref 0.4–4)
WBC # BLD AUTO: 4.6 K/UL (ref 3.9–12.7)

## 2023-07-21 PROCEDURE — 99397 PER PM REEVAL EST PAT 65+ YR: CPT | Mod: HCNC,S$GLB,, | Performed by: INTERNAL MEDICINE

## 2023-07-21 PROCEDURE — 1160F PR REVIEW ALL MEDS BY PRESCRIBER/CLIN PHARMACIST DOCUMENTED: ICD-10-PCS | Mod: HCNC,CPTII,S$GLB, | Performed by: INTERNAL MEDICINE

## 2023-07-21 PROCEDURE — 99397 PR PREVENTIVE VISIT,EST,65 & OVER: ICD-10-PCS | Mod: HCNC,S$GLB,, | Performed by: INTERNAL MEDICINE

## 2023-07-21 PROCEDURE — 80061 LIPID PANEL: CPT | Mod: HCNC | Performed by: INTERNAL MEDICINE

## 2023-07-21 PROCEDURE — 1101F PR PT FALLS ASSESS DOC 0-1 FALLS W/OUT INJ PAST YR: ICD-10-PCS | Mod: HCNC,CPTII,S$GLB, | Performed by: INTERNAL MEDICINE

## 2023-07-21 PROCEDURE — 36415 COLL VENOUS BLD VENIPUNCTURE: CPT | Mod: HCNC,PO | Performed by: INTERNAL MEDICINE

## 2023-07-21 PROCEDURE — 1160F RVW MEDS BY RX/DR IN RCRD: CPT | Mod: HCNC,CPTII,S$GLB, | Performed by: INTERNAL MEDICINE

## 2023-07-21 PROCEDURE — 80053 COMPREHEN METABOLIC PANEL: CPT | Mod: HCNC | Performed by: INTERNAL MEDICINE

## 2023-07-21 PROCEDURE — 1126F PR PAIN SEVERITY QUANTIFIED, NO PAIN PRESENT: ICD-10-PCS | Mod: HCNC,CPTII,S$GLB, | Performed by: INTERNAL MEDICINE

## 2023-07-21 PROCEDURE — 3288F PR FALLS RISK ASSESSMENT DOCUMENTED: ICD-10-PCS | Mod: HCNC,CPTII,S$GLB, | Performed by: INTERNAL MEDICINE

## 2023-07-21 PROCEDURE — 99999 PR PBB SHADOW E&M-EST. PATIENT-LVL V: CPT | Mod: PBBFAC,HCNC,, | Performed by: INTERNAL MEDICINE

## 2023-07-21 PROCEDURE — 1126F AMNT PAIN NOTED NONE PRSNT: CPT | Mod: HCNC,CPTII,S$GLB, | Performed by: INTERNAL MEDICINE

## 2023-07-21 PROCEDURE — 85025 COMPLETE CBC W/AUTO DIFF WBC: CPT | Mod: HCNC | Performed by: INTERNAL MEDICINE

## 2023-07-21 PROCEDURE — 1159F PR MEDICATION LIST DOCUMENTED IN MEDICAL RECORD: ICD-10-PCS | Mod: HCNC,CPTII,S$GLB, | Performed by: INTERNAL MEDICINE

## 2023-07-21 PROCEDURE — 1159F MED LIST DOCD IN RCRD: CPT | Mod: HCNC,CPTII,S$GLB, | Performed by: INTERNAL MEDICINE

## 2023-07-21 PROCEDURE — 84443 ASSAY THYROID STIM HORMONE: CPT | Mod: HCNC | Performed by: INTERNAL MEDICINE

## 2023-07-21 PROCEDURE — 3288F FALL RISK ASSESSMENT DOCD: CPT | Mod: HCNC,CPTII,S$GLB, | Performed by: INTERNAL MEDICINE

## 2023-07-21 PROCEDURE — 99999 PR PBB SHADOW E&M-EST. PATIENT-LVL V: ICD-10-PCS | Mod: PBBFAC,HCNC,, | Performed by: INTERNAL MEDICINE

## 2023-07-21 PROCEDURE — 1101F PT FALLS ASSESS-DOCD LE1/YR: CPT | Mod: HCNC,CPTII,S$GLB, | Performed by: INTERNAL MEDICINE

## 2023-07-21 RX ORDER — FLUCYTOSINE 500 MG/1
CAPSULE ORAL
Status: ON HOLD | COMMUNITY
Start: 2023-06-03 | End: 2024-02-04 | Stop reason: HOSPADM

## 2023-07-21 RX ORDER — CLINDAMYCIN PHOSPHATE 10 UG/ML
LOTION TOPICAL
COMMUNITY
Start: 2023-07-15 | End: 2024-01-27

## 2023-07-22 ENCOUNTER — TELEPHONE (OUTPATIENT)
Dept: INTERNAL MEDICINE | Facility: CLINIC | Age: 88
End: 2023-07-22
Payer: MEDICARE

## 2023-07-22 DIAGNOSIS — D64.9 ANEMIA, UNSPECIFIED TYPE: Primary | ICD-10-CM

## 2023-07-22 NOTE — TELEPHONE ENCOUNTER
Your blood counts are stable from prior check.  Your electrolytes, liver function, thyroid function, cholesterol are normal.  Kidney function is stable.  I think the blood counts are low, because the chronic kidney disease, but I would like to check labs to ensure this is the case.

## 2023-07-25 ENCOUNTER — APPOINTMENT (OUTPATIENT)
Dept: RADIOLOGY | Facility: CLINIC | Age: 88
End: 2023-07-25
Attending: INTERNAL MEDICINE
Payer: MEDICARE

## 2023-07-25 DIAGNOSIS — M81.6 LOCALIZED OSTEOPOROSIS, UNSPECIFIED PATHOLOGICAL FRACTURE PRESENCE: ICD-10-CM

## 2023-07-25 PROCEDURE — 77080 DXA BONE DENSITY AXIAL: CPT | Mod: TC,HCNC,PO

## 2023-07-25 PROCEDURE — 77080 DXA BONE DENSITY AXIAL SKELETON 1 OR MORE SITES: ICD-10-PCS | Mod: 26,HCNC,, | Performed by: INTERNAL MEDICINE

## 2023-07-25 PROCEDURE — 77080 DXA BONE DENSITY AXIAL: CPT | Mod: 26,HCNC,, | Performed by: INTERNAL MEDICINE

## 2023-07-31 ENCOUNTER — HOSPITAL ENCOUNTER (OUTPATIENT)
Dept: WOUND CARE | Facility: HOSPITAL | Age: 88
Discharge: HOME | End: 2023-07-31
Attending: PREVENTIVE MEDICINE
Payer: MEDICARE

## 2023-07-31 VITALS
TEMPERATURE: 96 F | SYSTOLIC BLOOD PRESSURE: 136 MMHG | WEIGHT: 164 LBS | BODY MASS INDEX: 27.32 KG/M2 | HEART RATE: 111 BPM | DIASTOLIC BLOOD PRESSURE: 60 MMHG | HEIGHT: 65 IN

## 2023-07-31 DIAGNOSIS — I87.2 VENOUS STASIS DERMATITIS OF BOTH LOWER EXTREMITIES: Primary | ICD-10-CM

## 2023-07-31 DIAGNOSIS — I87.2 VENOUS INSUFFICIENCY OF BOTH LOWER EXTREMITIES: ICD-10-CM

## 2023-07-31 DIAGNOSIS — S81.801D MULTIPLE OPEN WOUNDS OF RIGHT LOWER EXTREMITY, SUBSEQUENT ENCOUNTER: ICD-10-CM

## 2023-07-31 DIAGNOSIS — L30.4 INTERTRIGO: ICD-10-CM

## 2023-07-31 PROCEDURE — 99212 OFFICE O/P EST SF 10 MIN: CPT | Mod: HCNC

## 2023-07-31 NOTE — PROGRESS NOTES
[facility]    Subjective:       Patient ID: Jenniffer Jimenez is a 89 y.o. female.    Chief Complaint: Wound Care    Follow up for her right left lower leg wound.  No complaints.  Wounds have resolved.  Patient brought her own compression stockings.        Review of Systems   All other systems reviewed and are negative.        Objective:     Vitals:    07/31/23 1120   BP: 136/60   Pulse:    Temp:          Physical Exam       Altered Skin Integrity Right anterior;lower;lateral;medial;posterior Leg (Active)       Altered Skin Integrity Present on Admission - Did Patient arrive to the hospital with altered skin?: yes   Side: Right   Orientation: anterior;lower;lateral;medial;posterior   Location: Leg   Wound Number:    Is this injury device related?:    Primary Wound Type:    Description of Altered Skin Integrity:    Ankle-Brachial Index:    Pulses:    Removal Indication and Assessment:    Wound Outcome:    (Retired) Wound Length (cm):    (Retired) Wound Width (cm):    (Retired) Depth (cm):    Wound Description (Comments):    Removal Indications:    Wound Image   07/31/23 1100   Dressing Appearance Open to air 07/31/23 1100   Drainage Amount None 07/31/23 1100   Tissue loss description Not applicable 07/31/23 1100   Periwound Area Intact;Dry 07/31/23 1100   Wound Edges Rolled/closed 07/31/23 1100   Wound Length (cm) 0 cm 07/31/23 1100   Wound Width (cm) 0 cm 07/31/23 1100   Wound Depth (cm) 0 cm 07/31/23 1100   Wound Volume (cm^3) 0 cm^3 07/31/23 1100   Wound Surface Area (cm^2) 0 cm^2 07/31/23 1100   Care Cleansed with:;Soap and water 07/31/23 1100   Periwound Care Moisturizer applied 07/31/23 1100   Compression Compression Stocking (20-30 mmHg) 07/31/23 1100   Dressing Change Due 08/01/23 07/31/23 1100         Assessment/Plan:         ICD-10-CM ICD-9-CM   1. Venous stasis dermatitis of both lower extremities  I87.2 454.1   2. Multiple open wounds of right lower  extremity, subsequent encounter  S81.801D V58.89     894.0   3. Intertrigo  L30.4 695.89   4. Venous insufficiency of both lower extremities  I87.2 459.81           Tissue pathology and/or culture taken:  [] Yes [x] No   Sharp debridement performed:   [] Yes [x] No   Labs ordered this visit:   [] Yes [x] No   Imaging ordered this visit:   [] Yes [x] No           Orders Placed This Encounter   Procedures    Change dressing     Healed Wound Instructions:Your wound is healed, it is extremely fragile at this stage; protect it from friction, wash it gently and pat dry, continue applying triamcinolone 0.1% to dry scaly areas on right lower leg until running out of triamcinolone, then resume application of lotion to bilateral legs.  Patient to wear compression stockings to bilateral legs daily.  If the wound should re-open, please call [phone] for further instructions.        Follow up for Discharged from clinic.            This includes face to face time and non-face to face time preparing to see the patient (eg, review of tests), obtaining and/or reviewing separately obtained history, documenting clinical information in the electronic or other health record, independently interpreting results and communicating results to the patient/family/caregiver, or care coordinator.

## 2023-08-07 ENCOUNTER — HOSPITAL ENCOUNTER (OUTPATIENT)
Dept: WOUND CARE | Facility: HOSPITAL | Age: 88
Discharge: HOME | End: 2023-08-07
Attending: PREVENTIVE MEDICINE
Payer: MEDICARE

## 2023-08-07 VITALS
SYSTOLIC BLOOD PRESSURE: 155 MMHG | WEIGHT: 164 LBS | DIASTOLIC BLOOD PRESSURE: 69 MMHG | HEIGHT: 65 IN | HEART RATE: 89 BPM | TEMPERATURE: 98 F | BODY MASS INDEX: 27.32 KG/M2

## 2023-08-07 DIAGNOSIS — L97.921 NON-PRESSURE ULCER OF LOWER EXTREMITY, LIMITED TO BREAKDOWN OF SKIN, LEFT: Primary | ICD-10-CM

## 2023-08-07 DIAGNOSIS — I87.2 VENOUS STASIS DERMATITIS OF BOTH LOWER EXTREMITIES: ICD-10-CM

## 2023-08-07 DIAGNOSIS — S81.802D OPEN WOUND OF LEFT LOWER EXTREMITY, SUBSEQUENT ENCOUNTER: ICD-10-CM

## 2023-08-07 PROBLEM — J18.9 COMMUNITY ACQUIRED PNEUMONIA OF LEFT LOWER LOBE OF LUNG: Status: RESOLVED | Noted: 2023-04-26 | Resolved: 2023-08-07

## 2023-08-07 PROCEDURE — 99213 OFFICE O/P EST LOW 20 MIN: CPT | Mod: HCNC

## 2023-08-07 RX ORDER — DOXYCYCLINE HYCLATE 100 MG
100 TABLET ORAL EVERY 12 HOURS
Qty: 14 TABLET | Refills: 0 | Status: SHIPPED | OUTPATIENT
Start: 2023-08-07 | End: 2023-08-14

## 2023-08-07 RX ORDER — CLOTRIMAZOLE AND BETAMETHASONE DIPROPIONATE 10; .64 MG/G; MG/G
CREAM TOPICAL DAILY
Qty: 45 G | Refills: 1 | Status: SHIPPED | OUTPATIENT
Start: 2023-08-07 | End: 2024-01-27 | Stop reason: SDUPTHER

## 2023-08-07 NOTE — PROGRESS NOTES
"                     [facility]    Subjective:       Patient ID: Jenniffer Jimenez is a 89 y.o. female.    Chief Complaint: Wound Care    Follow up related to traumatic left lower leg injury.  Patient states that "I was opening a can for my cat when I dropped the knife", "initially it was only freckle sized and it bled a lot", "my home health nurse came the next day and said I should call to see you".  States that there are new red spots since this morning.  She has been cleaning it with soap and water, "a spray home health left", her triamcinolone and leaving it open to air.  No fever, chills or pus.  Periwound is warm.  Plan of care updated, orders faxed to home health.        Review of Systems   All other systems reviewed and are negative.        Objective:     Vitals:    08/07/23 1357   BP: (!) 155/69   Pulse: 89   Temp: 97.6 °F (36.4 °C)         Physical Exam       Altered Skin Integrity 08/07/23 1400 Left lower;medial Leg Traumatic Full thickness tissue loss. Base is covered by slough and/or eschar in the wound bed (Active)   08/07/23 1400   Altered Skin Integrity Present on Admission - Did Patient arrive to the hospital with altered skin?: yes   Side: Left   Orientation: lower;medial   Location: Leg   Wound Number:    Is this injury device related?: No   Primary Wound Type: Traumatic   Description of Altered Skin Integrity: Full thickness tissue loss. Base is covered by slough and/or eschar in the wound bed   Ankle-Brachial Index:    Pulses:    Removal Indication and Assessment:    Wound Outcome:    (Retired) Wound Length (cm):    (Retired) Wound Width (cm):    (Retired) Depth (cm):    Wound Description (Comments):    Removal Indications:    Wound Image   08/07/23 1400   Description of Altered Skin Integrity Full thickness tissue loss. Base is covered by slough and/or eschar in the wound bed 08/07/23 1400   Dressing Appearance Open to air 08/07/23 1400   Drainage Amount None " 08/07/23 1400   Appearance Black;Dry;Eschar 08/07/23 1400   Tissue loss description Not applicable 08/07/23 1400   Black (%), Wound Tissue Color 100 % 08/07/23 1400   Periwound Area Warm;Redness;Dry 08/07/23 1400   Wound Edges Rolled/closed 08/07/23 1400   Wound Length (cm) 0.3 cm 08/07/23 1400   Wound Width (cm) 0.4 cm 08/07/23 1400   Wound Depth (cm) 0 cm 08/07/23 1400   Wound Volume (cm^3) 0 cm^3 08/07/23 1400   Wound Surface Area (cm^2) 0.12 cm^2 08/07/23 1400   Care Cleansed with:;Soap and water;Sterile normal saline 08/07/23 1400         Assessment/Plan:         ICD-10-CM ICD-9-CM   1. Non-pressure ulcer of lower extremity, limited to breakdown of skin, left  L97.921 707.10   2. Venous stasis dermatitis of both lower extremities  I87.2 454.1   3. Open wound of left lower extremity, subsequent encounter  S81.802D V58.89     891.0           Tissue pathology and/or culture taken:  [] Yes [x] No   Sharp debridement performed:   [] Yes [x] No   Labs ordered this visit:   [] Yes [x] No   Imaging ordered this visit:   [] Yes [x] No           Orders Placed This Encounter   Procedures    Change dressing     Left lower leg traumatic injury    Cleanse with: Soap and water or normal saline  Periwound care: Lotrisone to LLE redness at home, betamethasone in clinic   Primary dressing: xeroform, cover with abd pad  Edema control: tubigrip E to left leg toes to knee, on am, off hs; elevate legs when sitting   Frequency: three times weekly and as needed   Follow-up: Dr. Cox 2 weeks     Home health: Please continue wound care as above three times weekly and as needed except when the patient is in clinic, thank you.    Patient may use own compression stocking 15 mmHg to right leg.     Ok for patient to take showers.    Other orders: patient to start taking doxycyline 100mg PO BID        Follow up in about 2 weeks (around 8/21/2023) for Dr Cox.            This includes face to face time and non-face to face time preparing  to see the patient (eg, review of tests), obtaining and/or reviewing separately obtained history, documenting clinical information in the electronic or other health record, independently interpreting results and communicating results to the patient/family/caregiver, or care coordinator.

## 2023-08-11 PROBLEM — S81.802A OPEN WOUND OF LEFT LOWER EXTREMITY: Status: ACTIVE | Noted: 2022-11-08

## 2023-08-15 ENCOUNTER — OFFICE VISIT (OUTPATIENT)
Dept: CARDIOLOGY | Facility: CLINIC | Age: 88
End: 2023-08-15
Payer: MEDICARE

## 2023-08-15 VITALS
HEIGHT: 65 IN | WEIGHT: 165 LBS | SYSTOLIC BLOOD PRESSURE: 152 MMHG | HEART RATE: 75 BPM | BODY MASS INDEX: 27.49 KG/M2 | DIASTOLIC BLOOD PRESSURE: 66 MMHG

## 2023-08-15 DIAGNOSIS — I10 ESSENTIAL HYPERTENSION: Chronic | ICD-10-CM

## 2023-08-15 DIAGNOSIS — I50.32 CHRONIC DIASTOLIC HEART FAILURE: Primary | ICD-10-CM

## 2023-08-15 DIAGNOSIS — I48.20 CHRONIC A-FIB: Chronic | ICD-10-CM

## 2023-08-15 DIAGNOSIS — E78.00 PURE HYPERCHOLESTEROLEMIA: Chronic | ICD-10-CM

## 2023-08-15 DIAGNOSIS — Z95.818 PRESENCE OF WATCHMAN LEFT ATRIAL APPENDAGE CLOSURE DEVICE: Chronic | ICD-10-CM

## 2023-08-15 PROCEDURE — 1160F PR REVIEW ALL MEDS BY PRESCRIBER/CLIN PHARMACIST DOCUMENTED: ICD-10-PCS | Mod: CPTII,S$GLB,, | Performed by: INTERNAL MEDICINE

## 2023-08-15 PROCEDURE — 1126F PR PAIN SEVERITY QUANTIFIED, NO PAIN PRESENT: ICD-10-PCS | Mod: CPTII,S$GLB,, | Performed by: INTERNAL MEDICINE

## 2023-08-15 PROCEDURE — 1159F PR MEDICATION LIST DOCUMENTED IN MEDICAL RECORD: ICD-10-PCS | Mod: CPTII,S$GLB,, | Performed by: INTERNAL MEDICINE

## 2023-08-15 PROCEDURE — 1159F MED LIST DOCD IN RCRD: CPT | Mod: CPTII,S$GLB,, | Performed by: INTERNAL MEDICINE

## 2023-08-15 PROCEDURE — 99214 PR OFFICE/OUTPT VISIT, EST, LEVL IV, 30-39 MIN: ICD-10-PCS | Mod: S$GLB,,, | Performed by: INTERNAL MEDICINE

## 2023-08-15 PROCEDURE — 1126F AMNT PAIN NOTED NONE PRSNT: CPT | Mod: CPTII,S$GLB,, | Performed by: INTERNAL MEDICINE

## 2023-08-15 PROCEDURE — 1101F PR PT FALLS ASSESS DOC 0-1 FALLS W/OUT INJ PAST YR: ICD-10-PCS | Mod: CPTII,S$GLB,, | Performed by: INTERNAL MEDICINE

## 2023-08-15 PROCEDURE — 99999 PR PBB SHADOW E&M-EST. PATIENT-LVL IV: ICD-10-PCS | Mod: PBBFAC,,, | Performed by: INTERNAL MEDICINE

## 2023-08-15 PROCEDURE — 3288F PR FALLS RISK ASSESSMENT DOCUMENTED: ICD-10-PCS | Mod: CPTII,S$GLB,, | Performed by: INTERNAL MEDICINE

## 2023-08-15 PROCEDURE — 1101F PT FALLS ASSESS-DOCD LE1/YR: CPT | Mod: CPTII,S$GLB,, | Performed by: INTERNAL MEDICINE

## 2023-08-15 PROCEDURE — 99214 OFFICE O/P EST MOD 30 MIN: CPT | Mod: S$GLB,,, | Performed by: INTERNAL MEDICINE

## 2023-08-15 PROCEDURE — 3288F FALL RISK ASSESSMENT DOCD: CPT | Mod: CPTII,S$GLB,, | Performed by: INTERNAL MEDICINE

## 2023-08-15 PROCEDURE — 99999 PR PBB SHADOW E&M-EST. PATIENT-LVL IV: CPT | Mod: PBBFAC,,, | Performed by: INTERNAL MEDICINE

## 2023-08-15 PROCEDURE — 1160F RVW MEDS BY RX/DR IN RCRD: CPT | Mod: CPTII,S$GLB,, | Performed by: INTERNAL MEDICINE

## 2023-08-15 RX ORDER — SPIRONOLACTONE 25 MG/1
12.5 TABLET ORAL DAILY
Qty: 15 TABLET | Refills: 11 | Status: SHIPPED | OUTPATIENT
Start: 2023-08-15 | End: 2023-08-17 | Stop reason: SDUPTHER

## 2023-08-15 NOTE — PROGRESS NOTES
Subjective:   08/15/2023     Patient ID:  Jenniffer Jimenez is a 89 y.o. female who presents for evaulation of Follow-up      Comes in for follow-up of heart failure.  Your weight has gone up.  Yesterday she was short of breath, weight had increased, was taking furosemide 40 mg daily, took an extra 40 mg last night.  Her breathing is better today.    Has chronic atrial fibrillation, rate controlled, no anticoagulation post Watchman.          Echocardiogram 4/23:   · The left ventricle is normal in size with concentric hypertrophy and normal systolic function.  · The estimated ejection fraction is 55%.  · Indeterminate left ventricular diastolic function.  · With normal right ventricular systolic function.  · Severe left atrial enlargement.  · Severe right atrial enlargement.  · Mild mitral regurgitation.  · Mild to moderate tricuspid regurgitation.  · There is pulmonary hypertension.  · The estimated PA systolic pressure is 56 mmHg.  · Elevated central venous pressure (15 mmHg).       Prior note:   She comes in for cardiac follow-up.  Lots of problems recently with lower extremity cellulitis.  Hospitalized for that, developed delirium.  Subsequent to our last visit, she developed dehydration was seen in the emergency room.  Her weight has increased since then from 149 lb to 155 lb.  She is not had chest pains or tightness    She has not had increasing chest pains or tightness, shortness of breath, PND or orthopnea.  She is status placement of a left atrial appendage occluder 2021, November.  Follow up in [facility], note made that she should take SBE prophylaxis for life, she has not been doing that.    She has previous had diastolic heart failure, shortness of breath not increasing, she was on spironolactone, caused hyperkalemia.    Chronic atrial fibrillation is present, currently off of anticoagulation, only on aspirin.  No bleeding problems.    Comes in today for heart failure follow-up.  I would  put her on Jardiance last visit, she never took it, worried about side effects.  She had been off of spironolactone also, review of the chart shows 1 episode of hyperkalemia, now on the low side.      Her swelling has been stable, wounds of the lower extremity are healing, no exertional chest pains tightness or shortness of breath, no PND orthopnea.    Hypercholesterolemia is on present, on moderate dose statin therapy.       She does have chronic atrial fibrillation is status post Watchman device as noted above.  Her heart rate is well controlled.    Prior note:    She presented for evaluation of chest pain in February of 2021.  She had noted increasing swelling in her legs.  She had taken additional furosemide.  She is status post Watchman device placement in December of 2020.  She is off anticoagulation, now on aspirin only.  She has not had bleeding problems.  She does not have a history of chest pain.  Cardiac catheterization performed many years ago was normal.  Echocardiography continue show evidence for diastolic dysfunction with increased PA pressures and increased left atrial size.  She has not had increasing shortness of breath.  Her weight has been stable, taking additional furosemide and aldosterone is needed.  Laboratory work recently has shown stable renal to and potassium.          She was felt to have acute on chronic chronic diastolic heart failure.  My recommendations then were:  1.  Discontinue potassium chloride  2.  Take furosemide 20 mg twice a day to get rid of fluid, decrease to once a day when fluid improved.  3.  Take spironolactone 25 mg 1 with each furosemide.  4.  Call me if you have recurrent chest pain.  5.  Weigh yourself daily.  Record this and bring it in with your next visit     I saw her 2 weeks after the initial presentation.  She was markedly improved.  Recommendations then were:    1.  Decrease Lasix/furosemide and Aldactone/spironolactone to 1 a day as long as weight stays  below 156 lb, okay to increase to twice a day for increased weight.  2.  Please do a blood test for me today to check kidney function.    She developed an episode of lightheadedness and her weight target was changed to 160lb.      Echocardiogram from January 2022:  · The left ventricle is normal in size with normal systolic function.  · The estimated ejection fraction is 65%.  · Severe left atrial enlargement.  · Grade III left ventricular diastolic dysfunction.  · The estimated PA systolic pressure is 51 mmHg.  · Normal right ventricular size with normal right ventricular systolic function.  · There is mild pulmonary hypertension.  · Intermediate central venous pressure (8 mmHg).  · Moderate right atrial enlargement.  · Moderate tricuspid regurgitation.  · Mild mitral regurgitation.       Recent carotid ultrasound shows mild bilateral disease:  There is 20-39% right Internal Carotid Stenosis.  There is 40-49% left Internal Carotid Stenosis.      Congestive Heart Failure  Pertinent negatives include no chest pain, claudication, near-syncope or palpitations.   Hypertension  Associated symptoms include headaches. Pertinent negatives include no chest pain, orthopnea, palpitations or PND.   Hyperlipidemia  Pertinent negatives include no chest pain.   Follow-up  Associated symptoms include headaches and numbness. Pertinent negatives include no chest pain.       Patient Active Problem List   Diagnosis    Pure hypercholesterolemia    Pseudophakia, both eyes    Stable central retinal vein occlusion of left eye    Chronic rhinitis    Hearing loss, sensorineural    Primary hypertension    Arthritis    Exotropia of both eyes    Debility    Gait instability    Meniere's disease    Facet arthropathy, lumbosacral    Lumbar spinal stenosis    Hypermetropia of right eye    Chronic a-fib    Stage 3b chronic kidney disease    Atherosclerosis of aorta    Acute on chronic diastolic heart failure    History of TIA (transient ischemic  "attack)    Osteoporosis    Primary osteoarthritis of right shoulder    History of strabismus surgery    Encounter for long-term (current) use of antiplatelets/antithrombotics    Prediabetes    Refractive error    Posterior vitreous detachment, bilateral    Dry eye syndrome    Overweight (BMI 25.0-29.9)    Risk for falls    OAB (overactive bladder)    Venous stasis dermatitis    BRVO (branch retinal vein occlusion)    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 eye with active choroidal neovascularization    Hypertensive retinopathy of both eyes    Unspecified inflammatory spondylopathy, lumbosacral region    Presence of Watchman left atrial appendage closure device    Normocytic anemia    Cervicogenic headache    Diabetic polyneuropathy    Senile purpura    Skin tear of forearm without complication, left, initial encounter    Tear of left supraspinatus tendon    Weakness of shoulder    Impaired function of upper extremity    Normocytic normochromic anemia    Open wound of left lower extremity    Unable to care for self    Hyperkalemia    Trochanteric bursitis of right hip    Intertrigo    Generalized skin eruption due to medication taken internally    High anion gap metabolic acidosis    Delirium due to medical condition with behavioral disturbance    Pulmonary hypertension    History of diabetes mellitus    Polyneuropathy in diseases classified elsewhere    Microcytic anemia    Heart failure with preserved ejection fraction    Uses walker        Review of patient's allergies indicates:   Allergen Reactions    Opioids - morphine analogues Other (See Comments)     Bowel issues; bowel obstruction    Tizanidine Other (See Comments)     "Lips were numb,  Almost passed out."    Tramadol Hallucinations    Beta-blockers (beta-adrenergic blocking agts) Other (See Comments)     Can not go on beta blockers for long period of time - due to taking allergy injections    Morphine     Opioids-meperidine and related     " Ciprofloxacin Rash         Current Outpatient Medications:     acetaminophen (TYLENOL) 500 MG tablet, Take 1,000 mg by mouth 2 (two) times a day., Disp: , Rfl:     albuterol-ipratropium (DUO-NEB) 2.5 mg-0.5 mg/3 mL nebulizer solution, Take 3 mLs by nebulization every 6 (six) hours as needed for Wheezing or Shortness of Breath. Rescue, Disp: 75 mL, Rfl: 0    aspirin (ECOTRIN) 81 MG EC tablet, Take 1 tablet (81 mg total) by mouth once daily., Disp: 90 tablet, Rfl: 3    chlorpheniramine-pseudoephedrine-acetaminophen (SINUTAB) 2- mg per tablet, 2 tablet 2 TIMES DAILY (route: oral), Disp: , Rfl:     clindamycin (CLEOCIN T) 1 % lotion, , Disp: , Rfl:     clotrimazole-betamethasone 1-0.05% (LOTRISONE) cream, Apply topically once daily. To both legs, Disp: 45 g, Rfl: 1    diclofenac sodium (VOLTAREN) 1 % Gel, Apply 2 g topically 4 (four) times daily. Apply to right hip, Disp: , Rfl:     ferrous sulfate 325 (65 FE) MG EC tablet, Take 1 tablet (325 mg total) by mouth once daily., Disp: , Rfl:     flucytosine (ANCOBON) 500 mg Cap, Take by mouth., Disp: , Rfl:     fluticasone propionate (FLONASE) 50 mcg/actuation nasal spray, 2 sprays (100 mcg total) by Each Nostril route once daily., Disp: , Rfl: 0    furosemide (LASIX) 20 MG tablet, Take 2 tablets in the a.m. for weights 155-159, take 2 tablets twice a day for weights 160 over, none for weights less than 155, Disp: 100 tablet, Rfl: 11    miconazole (MICOTIN) 2 % cream, Per instructions 2 TIMES DAILY (route: topical), Disp: , Rfl:     miconazole nitrate 2% (MICOTIN) 2 % Oint, Apply topically 2 (two) times daily. Coloplast Clear Antifungal ointment- (green top)- nursing to apply to perineum, inner thighs, pannus, and buttocks BID, Disp: 141 g, Rfl: 0    mupirocin (BACTROBAN) 2 % ointment, Apply topically 3 (three) times daily., Disp: 30 g, Rfl: 0    pravastatin (PRAVACHOL) 40 MG tablet, Take 1 tablet (40 mg total) by mouth once daily., Disp: 90 tablet, Rfl: 3    propylene  glycol (SYSTANE COMPLETE OPHT), Apply to eye., Disp: , Rfl:     silver sulfADIAZINE 1% (SILVADENE) 1 % cream, Apply to RLE skin breakdown twice daily (Patient taking differently: Apply topically 2 (two) times daily. Apply to RLE skin breakdown twice daily), Disp: , Rfl:     triamcinolone acetonide 0.025% (KENALOG) 0.025 % Oint, Per instructions DAILY (route: topical), Disp: , Rfl:     triamcinolone acetonide 0.1% (KENALOG) 0.1 % ointment, Apply topically 2 (two) times daily. Use to affected areas for up to 2 weeks then take a 1 week break or decrease to 3 times weekly. Do not apply to groin or face. Apply to red scaly areas on the legs and arms, Disp: 454 g, Rfl: 1    vitamin E 400 UNIT capsule, Take 400 Units by mouth once daily., Disp: , Rfl:     spironolactone (ALDACTONE) 25 MG tablet, Take 0.5 tablets (12.5 mg total) by mouth once daily., Disp: 15 tablet, Rfl: 11     Objective:   Review of Systems   Cardiovascular:  Negative for chest pain, claudication, cyanosis, dyspnea on exertion, irregular heartbeat, leg swelling, near-syncope, orthopnea, palpitations, paroxysmal nocturnal dyspnea and syncope.   Musculoskeletal:  Positive for arthritis and muscle cramps.   Gastrointestinal:  Positive for constipation and diarrhea.   Neurological:  Positive for headaches, loss of balance, numbness and paresthesias.       Vitals:    08/15/23 0947   BP: (!) 152/66   Pulse: 75           Physical Exam  Vitals reviewed.   Constitutional:       General: She is not in acute distress.     Appearance: She is well-developed.   HENT:      Head: Normocephalic and atraumatic.      Nose: Nose normal.   Eyes:      Conjunctiva/sclera: Conjunctivae normal.      Pupils: Pupils are equal, round, and reactive to light.   Neck:      Vascular: Hepatojugular reflux and JVD present. No carotid bruit.   Cardiovascular:      Rate and Rhythm: Normal rate and regular rhythm.      Pulses: Intact distal pulses.      Heart sounds: Normal heart sounds. No  murmur heard.     No friction rub. No gallop.      Comments: Jugular venous pressure is normal.  No edema  Pulmonary:      Effort: Pulmonary effort is normal. No respiratory distress.      Breath sounds: Normal breath sounds. No wheezing or rales.   Chest:      Chest wall: No tenderness.   Abdominal:      General: Bowel sounds are normal. There is no distension.      Palpations: Abdomen is soft.      Tenderness: There is no abdominal tenderness.   Musculoskeletal:         General: No tenderness or deformity. Normal range of motion.      Cervical back: Normal range of motion and neck supple.      Right lower leg: Edema (edema to thigh) present.      Left lower leg: Edema (positive edema to thigh) present.      Comments: Both lower extremities below the knee wrapped   Skin:     General: Skin is warm and dry.      Findings: No erythema or rash.   Neurological:      Mental Status: She is alert and oriented to person, place, and time.      Cranial Nerves: No cranial nerve deficit.      Motor: No abnormal muscle tone.      Coordination: Coordination normal.   Psychiatric:         Behavior: Behavior normal.         Thought Content: Thought content normal.         Judgment: Judgment normal.          Assessment and Plan:     Chronic diastolic heart failure  Comments:  Still volume overloaded, add low-dose spironolactone  Refuses SG LT 2 inhibitor  Orders:  -     spironolactone (ALDACTONE) 25 MG tablet; Take 0.5 tablets (12.5 mg total) by mouth once daily.  Dispense: 15 tablet; Refill: 11    Essential hypertension  Comments:  Add spironolactone  Orders:  -     spironolactone (ALDACTONE) 25 MG tablet; Take 0.5 tablets (12.5 mg total) by mouth once daily.  Dispense: 15 tablet; Refill: 11    Presence of Watchman left atrial appendage closure device  Comments:  No anticoagulation    Chronic a-fib  Comments:  Rate controlled    Pure hypercholesterolemia  Comments:  On moderate dose statin therapy        Visit today for acute heart  failure, somewhat improved overnight after additional furosemide I ordered.  She remains volume overloaded with 2+ edema into the flank.  Will adjust furosemide as sliding scale, add Jardiance, blood work next week, office visit in 2 weeks.    Follow up in about 6 months (around 2/15/2024).

## 2023-08-17 DIAGNOSIS — I50.32 CHRONIC DIASTOLIC HEART FAILURE: ICD-10-CM

## 2023-08-17 DIAGNOSIS — I10 ESSENTIAL HYPERTENSION: Chronic | ICD-10-CM

## 2023-08-17 DIAGNOSIS — E78.00 PURE HYPERCHOLESTEROLEMIA: ICD-10-CM

## 2023-08-17 DIAGNOSIS — I50.31 ACUTE HEART FAILURE WITH PRESERVED EJECTION FRACTION: Chronic | ICD-10-CM

## 2023-08-17 RX ORDER — FUROSEMIDE 20 MG/1
TABLET ORAL
Qty: 100 TABLET | Refills: 11 | Status: SHIPPED | OUTPATIENT
Start: 2023-08-17 | End: 2023-10-09 | Stop reason: SDUPTHER

## 2023-08-17 RX ORDER — PRAVASTATIN SODIUM 40 MG/1
40 TABLET ORAL DAILY
Qty: 90 TABLET | Refills: 3 | Status: SHIPPED | OUTPATIENT
Start: 2023-08-17 | End: 2024-01-27 | Stop reason: SDUPTHER

## 2023-08-17 RX ORDER — SPIRONOLACTONE 25 MG/1
12.5 TABLET ORAL DAILY
Qty: 15 TABLET | Refills: 11 | Status: SHIPPED | OUTPATIENT
Start: 2023-08-17 | End: 2023-10-09 | Stop reason: SDUPTHER

## 2023-08-22 ENCOUNTER — DOCUMENTATION ONLY (OUTPATIENT)
Dept: AUDIOLOGY | Facility: CLINIC | Age: 88
End: 2023-08-22
Payer: MEDICARE

## 2023-08-22 NOTE — PROGRESS NOTES
Patient [facility] to be [facility] and tested.  The Left hearing aid  was not working. Patient was given a loaner  with a dome to use while hers is being fixed.  I will reach out to Mrs. Jimenez when her earmold is back.    Patient also purchased 4 packs of filters.      Hearing Aid Information:  Phonak L30-RL   Sand Beige   LT SN: [id number]   RT SN: [id number]   Warranty Exp:  06/11/2026     cShell 4.0 Acryl   Lt SN:  [id number]   ACC: [id number]   RT SN: [id number]   ACC: [id number]   Tube Length: 1   : Power   Warranty Exp  06/13/2023

## 2023-08-25 ENCOUNTER — HOSPITAL ENCOUNTER (OUTPATIENT)
Dept: WOUND CARE | Facility: HOSPITAL | Age: 88
Discharge: HOME | End: 2023-08-25
Attending: PREVENTIVE MEDICINE
Payer: MEDICARE

## 2023-08-25 VITALS
HEIGHT: 65 IN | TEMPERATURE: 98 F | HEART RATE: 72 BPM | WEIGHT: 165 LBS | SYSTOLIC BLOOD PRESSURE: 113 MMHG | DIASTOLIC BLOOD PRESSURE: 54 MMHG | BODY MASS INDEX: 27.49 KG/M2

## 2023-08-25 DIAGNOSIS — S81.801D MULTIPLE OPEN WOUNDS OF RIGHT LOWER EXTREMITY, SUBSEQUENT ENCOUNTER: ICD-10-CM

## 2023-08-25 DIAGNOSIS — I87.2 VENOUS STASIS DERMATITIS OF BOTH LOWER EXTREMITIES: Primary | ICD-10-CM

## 2023-08-25 DIAGNOSIS — L97.921 NON-PRESSURE ULCER OF LOWER EXTREMITY, LIMITED TO BREAKDOWN OF SKIN, LEFT: ICD-10-CM

## 2023-08-25 DIAGNOSIS — I87.2 VENOUS INSUFFICIENCY OF BOTH LOWER EXTREMITIES: ICD-10-CM

## 2023-08-25 PROCEDURE — 99212 OFFICE O/P EST SF 10 MIN: CPT | Mod: HCNC

## 2023-08-25 NOTE — PROGRESS NOTES
[facility]    Subjective:       Patient ID: Jenniffer Jimenez is a 89 y.o. female.    Chief Complaint: Venous Stasis     Follow up for BLE leg edema.   No complaints.  Wounds have resolved with residual excoriations.  Patient wearing her own compression stockings.         Review of Systems   All other systems reviewed and are negative.        Objective:     Vitals:    08/25/23 1414   BP: (!) 113/54   Pulse: 72   Temp: 97.6 °F (36.4 °C)         Physical Exam       Altered Skin Integrity 08/25/23 1446 Right lower Leg Other (comment) (Active)   08/25/23 1446   Altered Skin Integrity Present on Admission - Did Patient arrive to the hospital with altered skin?: yes   Side: Right   Orientation: lower   Location: Leg   Wound Number:    Is this injury device related?: No   Primary Wound Type: Other   Description of Altered Skin Integrity:    Ankle-Brachial Index:    Pulses:    Removal Indication and Assessment:    Wound Outcome:    (Retired) Wound Length (cm):    (Retired) Wound Width (cm):    (Retired) Depth (cm):    Wound Description (Comments):    Removal Indications:    Wound Image   08/25/23 1447   Dressing Appearance Intact 08/25/23 1447   Drainage Amount None 08/25/23 1447   Appearance Intact 08/25/23 1447   Care Cleansed with:;Sterile normal saline 08/25/23 1447   Dressing Other (comment) 08/25/23 1447   Periwound Care Moisturizer applied 08/25/23 1447   Compression Other (see comments) 08/25/23 1447   Dressing Change Due 09/22/23 08/25/23 1447         Assessment/Plan:         ICD-10-CM ICD-9-CM   1. Venous stasis dermatitis of both lower extremities  I87.2 454.1           Tissue pathology and/or culture taken:  [] Yes [x] No   Sharp debridement performed:   [] Yes [x] No   Labs ordered this visit:   [] Yes [x] No   Imaging ordered this visit:   [] Yes [x] No           Orders Placed This Encounter   Procedures    Change dressing     BLE:    Cleanse with: Soap and  water or normal saline   Periwound care: Lotrisone to BLE redness at home, betamethasone in clinic   Primary dressing: xeroform, cover with abd pad   Edema control: tubigrip E to left leg toes to knee, on am, off hs; elevate legs when sitting   Frequency: three times weekly and as needed   Follow-up: Dr. Cox 4 weeks        Patient may use own compression stocking 15 mmHg to BLE    Ok for patient to take showers.        Follow up in about 4 weeks (around 9/22/2023).            This includes face to face time and non-face to face time preparing to see the patient (eg, review of tests), obtaining and/or reviewing separately obtained history, documenting clinical information in the electronic or other health record, independently interpreting results and communicating results to the patient/family/caregiver, or care coordinator.

## 2023-08-30 ENCOUNTER — TELEPHONE (OUTPATIENT)
Dept: AUDIOLOGY | Facility: CLINIC | Age: 88
End: 2023-08-30
Payer: MEDICARE

## 2023-08-30 ENCOUNTER — DOCUMENTATION ONLY (OUTPATIENT)
Dept: AUDIOLOGY | Facility: CLINIC | Age: 88
End: 2023-08-30
Payer: MEDICARE

## 2023-08-30 NOTE — PROGRESS NOTES
Received in warranty repair on earmold from [employer].    Action Taken:  Replaced       cShell 4.0 Francineyl    SN:  [id number]   ACC: [id number]   Tube Length: 1   : Power   Warranty Exp  06/13/2023

## 2023-09-12 ENCOUNTER — OFFICE VISIT (OUTPATIENT)
Dept: OPHTHALMOLOGY | Facility: CLINIC | Age: 88
End: 2023-09-12
Payer: MEDICARE

## 2023-09-12 ENCOUNTER — TELEPHONE (OUTPATIENT)
Dept: AUDIOLOGY | Facility: CLINIC | Age: 88
End: 2023-09-12
Payer: MEDICARE

## 2023-09-12 ENCOUNTER — CLINICAL SUPPORT (OUTPATIENT)
Dept: AUDIOLOGY | Facility: CLINIC | Age: 88
End: 2023-09-12
Payer: MEDICARE

## 2023-09-12 DIAGNOSIS — H90.3 ASYMMETRICAL SENSORINEURAL HEARING LOSS: Primary | ICD-10-CM

## 2023-09-12 DIAGNOSIS — H35.3221 EXUDATIVE AGE-RELATED MACULAR DEGENERATION OF LEFT EYE WITH ACTIVE CHOROIDAL NEOVASCULARIZATION: Primary | ICD-10-CM

## 2023-09-12 DIAGNOSIS — H35.033 HYPERTENSIVE RETINOPATHY OF BOTH EYES: ICD-10-CM

## 2023-09-12 DIAGNOSIS — H34.8122 STABLE CENTRAL RETINAL VEIN OCCLUSION OF LEFT EYE: ICD-10-CM

## 2023-09-12 DIAGNOSIS — H43.813 POSTERIOR VITREOUS DETACHMENT, BILATERAL: ICD-10-CM

## 2023-09-12 PROCEDURE — 1160F RVW MEDS BY RX/DR IN RCRD: CPT | Mod: HCNC,CPTII,S$GLB, | Performed by: OPHTHALMOLOGY

## 2023-09-12 PROCEDURE — 92134 CPTRZ OPH DX IMG PST SGM RTA: CPT | Mod: HCNC,S$GLB,, | Performed by: OPHTHALMOLOGY

## 2023-09-12 PROCEDURE — 99999 PR PBB SHADOW E&M-EST. PATIENT-LVL II: ICD-10-PCS | Mod: PBBFAC,HCNC,, | Performed by: OPHTHALMOLOGY

## 2023-09-12 PROCEDURE — 99999 PR PBB SHADOW E&M-EST. PATIENT-LVL II: CPT | Mod: PBBFAC,HCNC,, | Performed by: OPHTHALMOLOGY

## 2023-09-12 PROCEDURE — 1160F PR REVIEW ALL MEDS BY PRESCRIBER/CLIN PHARMACIST DOCUMENTED: ICD-10-PCS | Mod: HCNC,CPTII,S$GLB, | Performed by: OPHTHALMOLOGY

## 2023-09-12 PROCEDURE — 92201 OPSCPY EXTND RTA DRAW UNI/BI: CPT | Mod: HCNC,S$GLB,, | Performed by: OPHTHALMOLOGY

## 2023-09-12 PROCEDURE — 92014 COMPRE OPH EXAM EST PT 1/>: CPT | Mod: HCNC,S$GLB,, | Performed by: OPHTHALMOLOGY

## 2023-09-12 PROCEDURE — 99499 UNLISTED E&M SERVICE: CPT | Mod: HCNC,S$GLB,,

## 2023-09-12 PROCEDURE — 1159F MED LIST DOCD IN RCRD: CPT | Mod: HCNC,CPTII,S$GLB, | Performed by: OPHTHALMOLOGY

## 2023-09-12 PROCEDURE — 99499 NO LOS: ICD-10-PCS | Mod: HCNC,S$GLB,,

## 2023-09-12 PROCEDURE — 92014 PR EYE EXAM, EST PATIENT,COMPREHESV: ICD-10-PCS | Mod: HCNC,S$GLB,, | Performed by: OPHTHALMOLOGY

## 2023-09-12 PROCEDURE — 1159F PR MEDICATION LIST DOCUMENTED IN MEDICAL RECORD: ICD-10-PCS | Mod: HCNC,CPTII,S$GLB, | Performed by: OPHTHALMOLOGY

## 2023-09-12 PROCEDURE — 92134 POSTERIOR SEGMENT OCT RETINA (OCULAR COHERENCE TOMOGRAPHY)-BOTH EYES: ICD-10-PCS | Mod: HCNC,S$GLB,, | Performed by: OPHTHALMOLOGY

## 2023-09-12 PROCEDURE — 1101F PR PT FALLS ASSESS DOC 0-1 FALLS W/OUT INJ PAST YR: ICD-10-PCS | Mod: HCNC,CPTII,S$GLB, | Performed by: OPHTHALMOLOGY

## 2023-09-12 PROCEDURE — 1101F PT FALLS ASSESS-DOCD LE1/YR: CPT | Mod: HCNC,CPTII,S$GLB, | Performed by: OPHTHALMOLOGY

## 2023-09-12 PROCEDURE — 3288F FALL RISK ASSESSMENT DOCD: CPT | Mod: HCNC,CPTII,S$GLB, | Performed by: OPHTHALMOLOGY

## 2023-09-12 PROCEDURE — 3288F PR FALLS RISK ASSESSMENT DOCUMENTED: ICD-10-PCS | Mod: HCNC,CPTII,S$GLB, | Performed by: OPHTHALMOLOGY

## 2023-09-12 PROCEDURE — 92201 PR OPHTHALMOSCOPY, EXT, W/RET DRAW/SCLERAL DEPR, I&R, UNI/BI: ICD-10-PCS | Mod: HCNC,S$GLB,, | Performed by: OPHTHALMOLOGY

## 2023-09-12 NOTE — PROGRESS NOTES
Hearing Aid Follow-up:    Ms. Abad was seen today for a hearing aid follow-up appointment to  her repaired cshell for her left hearing aid. She reported she has been hearing well with her hearing aids and has been changing her wax filters regularly. Ms. Abad's hearing aids were cleaned and checked. A listening check confirmed both hearing aids to be in good working condition. Ms. Abad will follow-up in 6 months of sooner if needed.       Hearing Aid Information:  Phonak L30-RL   Sand Beige   LT SN: [id number]   RT SN: [id number]   Warranty Exp:  06/11/2026     cShell 4.0 Acryl   Lt SN:  [id number]   ACC: [id number]   RT SN: [id number]   ACC: [id number]   Tube Length: 1   : Power   Warranty Exp  06/13/2023

## 2023-09-12 NOTE — PROGRESS NOTES
HPI     EYE EXAM  YEARLY OU    DFE      Additional comments: DFE OU   ARMD   DRYNESS OU   OCT OU   S/P AVASTIN INJECTION S OS   CRVO  OS   PCIOL    HTN   S/P STRABISMUS  SX    MGD OU            Comments    DLS   08/05/22    PPCNVM   PTOSIS   MIGHT BE INTERESTED IN LID LIFT  ?              OCT - OD - no ME  OS - SRF resolved      A/P    1. Wet AMD OS  PPCNVM  S/p Avastin OS x 6 (last 12/20)    Stable - continue obs    2. Prior CRVO OS  Stable    3. PCIOL OU    4. S/p strab sx    5. HTN Ret OU  BP control      12 months  OCT and dilate

## 2023-09-12 NOTE — TELEPHONE ENCOUNTER
----- Message from Sim Wick sent at 9/12/2023  9:36 AM CDT -----  Regarding: Pt advice  Contact: [phone]  Pt would like to see the provider Kobe No due to she will be there already and would like to see her for no more information. Please call She will be on campus for 1

## 2023-09-13 DIAGNOSIS — Z12.31 SCREENING MAMMOGRAM FOR BREAST CANCER: Primary | ICD-10-CM

## 2023-09-21 ENCOUNTER — HOSPITAL ENCOUNTER (OUTPATIENT)
Dept: RADIOLOGY | Facility: HOSPITAL | Age: 88
Discharge: HOME | End: 2023-09-21
Attending: INTERNAL MEDICINE
Payer: MEDICARE

## 2023-09-21 ENCOUNTER — OFFICE VISIT (OUTPATIENT)
Dept: INTERNAL MEDICINE | Facility: CLINIC | Age: 88
End: 2023-09-21
Payer: MEDICARE

## 2023-09-21 VITALS
WEIGHT: 173.06 LBS | SYSTOLIC BLOOD PRESSURE: 112 MMHG | BODY MASS INDEX: 28.83 KG/M2 | DIASTOLIC BLOOD PRESSURE: 58 MMHG | TEMPERATURE: 97 F | RESPIRATION RATE: 20 BRPM | HEIGHT: 65 IN | HEART RATE: 83 BPM | OXYGEN SATURATION: 96 %

## 2023-09-21 DIAGNOSIS — I48.20 CHRONIC A-FIB: Chronic | ICD-10-CM

## 2023-09-21 DIAGNOSIS — I70.0 ATHEROSCLEROSIS OF AORTA: Chronic | ICD-10-CM

## 2023-09-21 DIAGNOSIS — I10 PRIMARY HYPERTENSION: Primary | Chronic | ICD-10-CM

## 2023-09-21 DIAGNOSIS — Z12.31 SCREENING MAMMOGRAM FOR BREAST CANCER: ICD-10-CM

## 2023-09-21 DIAGNOSIS — I50.32 CHRONIC HEART FAILURE WITH PRESERVED EJECTION FRACTION: Chronic | ICD-10-CM

## 2023-09-21 DIAGNOSIS — E78.00 PURE HYPERCHOLESTEROLEMIA: Chronic | ICD-10-CM

## 2023-09-21 PROBLEM — E11.42 DIABETIC POLYNEUROPATHY: Status: RESOLVED | Noted: 2022-01-06 | Resolved: 2023-09-21

## 2023-09-21 PROCEDURE — 1101F PR PT FALLS ASSESS DOC 0-1 FALLS W/OUT INJ PAST YR: ICD-10-PCS | Mod: HCNC,CPTII,S$GLB, | Performed by: INTERNAL MEDICINE

## 2023-09-21 PROCEDURE — 3288F FALL RISK ASSESSMENT DOCD: CPT | Mod: HCNC,CPTII,S$GLB, | Performed by: INTERNAL MEDICINE

## 2023-09-21 PROCEDURE — 99999 PR PBB SHADOW E&M-EST. PATIENT-LVL IV: CPT | Mod: PBBFAC,HCNC,, | Performed by: INTERNAL MEDICINE

## 2023-09-21 PROCEDURE — 77067 MAMMO DIGITAL SCREENING BILAT WITH TOMO: ICD-10-PCS | Mod: 26,HCNC,, | Performed by: RADIOLOGY

## 2023-09-21 PROCEDURE — 1159F MED LIST DOCD IN RCRD: CPT | Mod: HCNC,CPTII,S$GLB, | Performed by: INTERNAL MEDICINE

## 2023-09-21 PROCEDURE — 1126F PR PAIN SEVERITY QUANTIFIED, NO PAIN PRESENT: ICD-10-PCS | Mod: HCNC,CPTII,S$GLB, | Performed by: INTERNAL MEDICINE

## 2023-09-21 PROCEDURE — 1160F RVW MEDS BY RX/DR IN RCRD: CPT | Mod: HCNC,CPTII,S$GLB, | Performed by: INTERNAL MEDICINE

## 2023-09-21 PROCEDURE — 77067 SCR MAMMO BI INCL CAD: CPT | Mod: TC,HCNC,PO

## 2023-09-21 PROCEDURE — 1126F AMNT PAIN NOTED NONE PRSNT: CPT | Mod: HCNC,CPTII,S$GLB, | Performed by: INTERNAL MEDICINE

## 2023-09-21 PROCEDURE — 77067 SCR MAMMO BI INCL CAD: CPT | Mod: 26,HCNC,, | Performed by: RADIOLOGY

## 2023-09-21 PROCEDURE — 99214 PR OFFICE/OUTPT VISIT, EST, LEVL IV, 30-39 MIN: ICD-10-PCS | Mod: HCNC,S$GLB,, | Performed by: INTERNAL MEDICINE

## 2023-09-21 PROCEDURE — 77063 BREAST TOMOSYNTHESIS BI: CPT | Mod: 26,HCNC,, | Performed by: RADIOLOGY

## 2023-09-21 PROCEDURE — 99999 PR PBB SHADOW E&M-EST. PATIENT-LVL IV: ICD-10-PCS | Mod: PBBFAC,HCNC,, | Performed by: INTERNAL MEDICINE

## 2023-09-21 PROCEDURE — 1159F PR MEDICATION LIST DOCUMENTED IN MEDICAL RECORD: ICD-10-PCS | Mod: HCNC,CPTII,S$GLB, | Performed by: INTERNAL MEDICINE

## 2023-09-21 PROCEDURE — 1101F PT FALLS ASSESS-DOCD LE1/YR: CPT | Mod: HCNC,CPTII,S$GLB, | Performed by: INTERNAL MEDICINE

## 2023-09-21 PROCEDURE — 1160F PR REVIEW ALL MEDS BY PRESCRIBER/CLIN PHARMACIST DOCUMENTED: ICD-10-PCS | Mod: HCNC,CPTII,S$GLB, | Performed by: INTERNAL MEDICINE

## 2023-09-21 PROCEDURE — 99214 OFFICE O/P EST MOD 30 MIN: CPT | Mod: HCNC,S$GLB,, | Performed by: INTERNAL MEDICINE

## 2023-09-21 PROCEDURE — 77063 MAMMO DIGITAL SCREENING BILAT WITH TOMO: ICD-10-PCS | Mod: 26,HCNC,, | Performed by: RADIOLOGY

## 2023-09-21 PROCEDURE — 3288F PR FALLS RISK ASSESSMENT DOCUMENTED: ICD-10-PCS | Mod: HCNC,CPTII,S$GLB, | Performed by: INTERNAL MEDICINE

## 2023-09-21 NOTE — PROGRESS NOTES
Subjective:       Patient ID: Jenniffer Jimenez is a 89 y.o. female.    Chief Complaint: Follow-up    HPI    89-year-old female here for follow-up.  She has started her iron tablets.  She had been constipated.  She does not have constipation currently.  She was before    HTN - Patient is currently on Aldactone 25 mg. She does not check her BP at home.  She would like to get a BP cuff. Side effects of medications note: none. Denies headaches, blurred vision, chest pain, shortness of breath, nausea.    She reports that her weight will not go below 165#.  Months ago, she was below 155#.  She is trying to eat a lot of fruit and vegetables.  She is watching the sodium.  She eats meals on wheels at noon.  She eats peaches at night.  She is on lasix 40 mg in the am and 40 mg in the pm.  She has no trouble breathing.  She is able to ride a stationary bike.  She usually does 30-60 minutes daily on her bike.      Her left knee was replaced in 2015.    HLD - Patient is currently on pravastatin 40 mg.  Her last lipid panel was   Cholesterol   Date Value Ref Range Status   07/21/2023 170 120 - 199 mg/dL Final     Comment:     The National Cholesterol Education Program (NCEP) has set the  following guidelines (reference ranges) for Cholesterol:  Optimal.....................<200 mg/dL  Borderline High.............200-239 mg/dL  High........................> or = 240 mg/dL       Triglycerides   Date Value Ref Range Status   07/21/2023 60 30 - 150 mg/dL Final     Comment:     The National Cholesterol Education Program (NCEP) has set the  following guidelines (reference values) for triglycerides:  Normal......................<150 mg/dL  Borderline High.............150-199 mg/dL  High........................200-499 mg/dL       HDL   Date Value Ref Range Status   07/21/2023 65 40 - 75 mg/dL Final     Comment:     The National Cholesterol Education Program (NCEP) has set the  following guidelines (reference values) for HDL  Cholesterol:  Low...............<40 mg/dL  Optimal...........>60 mg/dL       LDL Cholesterol   Date Value Ref Range Status   07/21/2023 93.0 63.0 - 159.0 mg/dL Final     Comment:     The National Cholesterol Education Program (NCEP) has set the  following guidelines (reference values) for LDL Cholesterol:  Optimal.......................<130 mg/dL  Borderline High...............130-159 mg/dL  High..........................160-189 mg/dL  Very High.....................>190 mg/dL     .  Side effects of the medication: none.    Patient has atrial fibrillation and is on no current medication.    Patient has heart failure and is on Lasix 20 mg daily.    Review of Systems      Objective:      Physical Exam  Vitals reviewed.   Constitutional:       Appearance: She is well-developed.   HENT:      Head: Normocephalic and atraumatic.      Mouth/Throat:      Pharynx: No oropharyngeal exudate.   Eyes:      General: No scleral icterus.        Right eye: No discharge.         Left eye: No discharge.      Pupils: Pupils are equal, round, and reactive to light.   Neck:      Thyroid: No thyromegaly.      Trachea: No tracheal deviation.   Cardiovascular:      Rate and Rhythm: Normal rate and regular rhythm.      Heart sounds: Normal heart sounds. No murmur heard.     No friction rub. No gallop.   Pulmonary:      Effort: Pulmonary effort is normal. No respiratory distress.      Breath sounds: Normal breath sounds. No wheezing or rales.   Chest:      Chest wall: No tenderness.   Abdominal:      General: Bowel sounds are normal. There is no distension.      Palpations: Abdomen is soft. There is no mass.      Tenderness: There is no abdominal tenderness. There is no guarding or rebound.   Musculoskeletal:         General: No tenderness. Normal range of motion.      Cervical back: Normal range of motion and neck supple.   Skin:     General: Skin is warm and dry.      Coloration: Skin is not pale.      Findings: No erythema or rash.    Neurological:      Mental Status: She is alert and oriented to person, place, and time.   Psychiatric:         Behavior: Behavior normal.         Assessment:       1. Primary hypertension    2. Pure hypercholesterolemia    3. Chronic a-fib    4. Atherosclerosis of aorta    5. Chronic heart failure with preserved ejection fraction      Plan:       1. Continue Aldactone 25 mg.  2.  Continue pravastatin 40 mg p.o.   3.  Monitor.    4.  Continue pravastatin 40 mg p.o.  5.  Continue Lasix 40 mg BID

## 2023-09-22 ENCOUNTER — HOSPITAL ENCOUNTER (OUTPATIENT)
Dept: WOUND CARE | Facility: HOSPITAL | Age: 88
Discharge: HOME | End: 2023-09-22
Attending: PREVENTIVE MEDICINE
Payer: MEDICARE

## 2023-09-22 VITALS
HEART RATE: 73 BPM | HEIGHT: 65 IN | SYSTOLIC BLOOD PRESSURE: 141 MMHG | DIASTOLIC BLOOD PRESSURE: 62 MMHG | TEMPERATURE: 99 F | BODY MASS INDEX: 28.82 KG/M2 | WEIGHT: 173 LBS

## 2023-09-22 DIAGNOSIS — L97.921 NON-PRESSURE ULCER OF LOWER EXTREMITY, LIMITED TO BREAKDOWN OF SKIN, LEFT: ICD-10-CM

## 2023-09-22 DIAGNOSIS — I87.2 VENOUS STASIS DERMATITIS OF BOTH LOWER EXTREMITIES: Primary | ICD-10-CM

## 2023-09-22 DIAGNOSIS — E13.42 DIABETIC POLYNEUROPATHY ASSOCIATED WITH OTHER SPECIFIED DIABETES MELLITUS: ICD-10-CM

## 2023-09-22 PROCEDURE — 99212 OFFICE O/P EST SF 10 MIN: CPT | Mod: HCNC

## 2023-09-22 NOTE — PROGRESS NOTES
[facility]    Subjective:       Patient ID: Jenniffer Jimenez is a 89 y.o. female.    Chief Complaint: Wound Care    Follow up for her right and left lower legs.  Wounds are resolved.  No new complaints.           Review of Systems   All other systems reviewed and are negative.        Objective:     Vitals:    09/22/23 1342   BP: (!) 141/62   Pulse: 73   Temp: 98.6 °F (37 °C)         Physical Exam       Altered Skin Integrity 08/25/23 1446 Right lower Leg Other (comment) (Active)   08/25/23 1446   Altered Skin Integrity Present on Admission - Did Patient arrive to the hospital with altered skin?: yes   Side: Right   Orientation: lower   Location: Leg   Wound Number:    Is this injury device related?: No   Primary Wound Type: Other   Description of Altered Skin Integrity:    Ankle-Brachial Index:    Pulses:    Removal Indication and Assessment:    Wound Outcome:    (Retired) Wound Length (cm):    (Retired) Wound Width (cm):    (Retired) Depth (cm):    Wound Description (Comments):    Removal Indications:    Wound Image    09/22/23 1400   Dressing Appearance Open to air 09/22/23 1400   Drainage Amount None 09/22/23 1400   Appearance Closed/resurfaced 09/22/23 1400   Tissue loss description Not applicable 09/22/23 1400   Periwound Area Redness;Dry;Intact 09/22/23 1400   Wound Edges Rolled/closed 09/22/23 1400   Wound Length (cm) 0 cm 09/22/23 1400   Wound Width (cm) 0 cm 09/22/23 1400   Wound Depth (cm) 0 cm 09/22/23 1400   Wound Volume (cm^3) 0 cm^3 09/22/23 1400   Wound Surface Area (cm^2) 0 cm^2 09/22/23 1400   Care Cleansed with:;Soap and water;Sterile normal saline 09/22/23 1400   Dressing Applied 09/22/23 1400   Periwound Care Topical treatment applied 09/22/23 1400   Compression Other (see comments) 09/22/23 1400   Dressing Change Due 09/23/23 09/22/23 1400           Assessment/Plan:         ICD-10-CM ICD-9-CM   1. Venous stasis dermatitis of both lower  extremities  I87.2 454.1   2. Non-pressure ulcer of lower extremity, limited to breakdown of skin, left  L97.921 707.10   3. Diabetic polyneuropathy associated with other specified diabetes mellitus  E13.42 250.60     357.2           Tissue pathology and/or culture taken:  [] Yes [x] No   Sharp debridement performed:   [] Yes [x] No   Labs ordered this visit:   [] Yes [x] No   Imaging ordered this visit:   [] Yes [x] No           Orders Placed This Encounter   Procedures    Change dressing     Right and left lower legs  Cleanse with: Soap and water or normal saline   Periwound care: Lotrisone to BLE redness at home, betamethasone in clinic  Edema control: compression stockings to bilateral legs, on am, off hs; elevate legs when sitting   Frequency: daily and as needed  Follow-up: Dr. Cox 8 weeks       Patient may use own compression stocking 15 mmHg to BLE     Ok for patient to take showers.        Follow up in about 8 weeks (around 11/17/2023) for Dr Cox.            This includes face to face time and non-face to face time preparing to see the patient (eg, review of tests), obtaining and/or reviewing separately obtained history, documenting clinical information in the electronic or other health record, independently interpreting results and communicating results to the patient/family/caregiver, or care coordinator.

## 2023-10-02 ENCOUNTER — TELEPHONE (OUTPATIENT)
Dept: PODIATRY | Facility: CLINIC | Age: 88
End: 2023-10-02

## 2023-10-09 ENCOUNTER — TELEPHONE (OUTPATIENT)
Dept: CARDIOLOGY | Facility: CLINIC | Age: 88
End: 2023-10-09
Payer: MEDICARE

## 2023-10-09 DIAGNOSIS — I10 ESSENTIAL HYPERTENSION: Chronic | ICD-10-CM

## 2023-10-09 DIAGNOSIS — I50.31 ACUTE HEART FAILURE WITH PRESERVED EJECTION FRACTION: Chronic | ICD-10-CM

## 2023-10-09 DIAGNOSIS — I50.32 CHRONIC DIASTOLIC HEART FAILURE: ICD-10-CM

## 2023-10-09 RX ORDER — FUROSEMIDE 20 MG/1
TABLET ORAL
Qty: 120 TABLET | Refills: 11 | Status: SHIPPED | OUTPATIENT
Start: 2023-10-09 | End: 2024-01-03 | Stop reason: SDUPTHER

## 2023-10-09 RX ORDER — SPIRONOLACTONE 25 MG/1
12.5 TABLET ORAL DAILY
Qty: 15 TABLET | Refills: 11 | Status: SHIPPED | OUTPATIENT
Start: 2023-10-09 | End: 2024-01-27 | Stop reason: DRUGHIGH

## 2023-10-09 RX ORDER — FUROSEMIDE 20 MG/1
TABLET ORAL
Qty: 100 TABLET | Refills: 11 | Status: SHIPPED | OUTPATIENT
Start: 2023-10-09 | End: 2023-10-09 | Stop reason: SDUPTHER

## 2023-10-09 NOTE — TELEPHONE ENCOUNTER
----- Message from Jeri Recinos sent at 10/9/2023  4:06 PM CDT -----  Regarding: rx  Pt needs a rx forfurosemide (LASIX) 20 MG tablet, 120 pills b/c she takes 4 a day sent to [facility] for delivery [phone].  She has no transportation.    Pls call pt at [phone] to confirm.

## 2023-10-09 NOTE — TELEPHONE ENCOUNTER
----- Message from Carissa Moore sent at 10/9/2023  8:42 AM CDT -----  Contact: [phone]  Pt is requesting a callback from the office. She would like to make sure the correct dosage is sent in.  Per pt, for her furosemide (LASIX) 20 MG tablet 100 tablet  she was only given a 100 pills. She will be out before the end of the week.  Pt states she needs enough pills to take 4 a day.     Pt is also requesting a refill on spironolactone (ALDACTONE) 25 MG tablet 15 tablet.     Pt will be using   [id number] - [place] [facility] [address]  AT [facility] [address] [facility] [address]  [address] [place] [address]  Phone: [phone] Fax: [phone]. Pt states she needs to have them delivered.     Per pt, please give her a call after 12pm today.     Thank you

## 2023-10-12 ENCOUNTER — OFFICE VISIT (OUTPATIENT)
Dept: PODIATRY | Facility: CLINIC | Age: 88
End: 2023-10-12
Payer: MEDICARE

## 2023-10-12 VITALS
WEIGHT: 166 LBS | RESPIRATION RATE: 17 BRPM | HEIGHT: 65 IN | SYSTOLIC BLOOD PRESSURE: 121 MMHG | BODY MASS INDEX: 27.66 KG/M2 | DIASTOLIC BLOOD PRESSURE: 64 MMHG | HEART RATE: 73 BPM

## 2023-10-12 DIAGNOSIS — G63 POLYNEUROPATHY IN DISEASES CLASSIFIED ELSEWHERE: ICD-10-CM

## 2023-10-12 DIAGNOSIS — B35.1 ONYCHOMYCOSIS DUE TO DERMATOPHYTE: ICD-10-CM

## 2023-10-12 DIAGNOSIS — L60.9 NAIL ABNORMALITIES: Primary | ICD-10-CM

## 2023-10-12 DIAGNOSIS — G60.9 IDIOPATHIC PERIPHERAL NEUROPATHY: ICD-10-CM

## 2023-10-12 PROCEDURE — 99999 PR PBB SHADOW E&M-EST. PATIENT-LVL III: ICD-10-PCS | Mod: PBBFAC,HCNC,, | Performed by: PODIATRIST

## 2023-10-12 PROCEDURE — 99499 UNLISTED E&M SERVICE: CPT | Mod: HCNC,S$GLB,, | Performed by: PODIATRIST

## 2023-10-12 PROCEDURE — 11721 DEBRIDE NAIL 6 OR MORE: CPT | Mod: Q9,HCNC,S$GLB, | Performed by: PODIATRIST

## 2023-10-12 PROCEDURE — 99499 NO LOS: ICD-10-PCS | Mod: HCNC,S$GLB,, | Performed by: PODIATRIST

## 2023-10-12 PROCEDURE — 11721 PR DEBRIDEMENT OF NAILS, 6 OR MORE: ICD-10-PCS | Mod: Q9,HCNC,S$GLB, | Performed by: PODIATRIST

## 2023-10-12 PROCEDURE — 99999 PR PBB SHADOW E&M-EST. PATIENT-LVL III: CPT | Mod: PBBFAC,HCNC,, | Performed by: PODIATRIST

## 2023-10-17 NOTE — PROGRESS NOTES
Subjective:     Patient ID: Jenniffer Jimenez is a 89 y.o. female.    Chief Complaint: PCP (Reed Ruiz MD/Internal Medicine  9/21/23), Diabetic Foot Exam, Nail Care, and Toe Pain    Jenniffer is a 89 y.o. female who presents to the clinic for evaluation and treatment of high risk feet. Jenniffer has a past medical history of Amblyopia of left eye (04/10/2013), Arthritis, Atherosclerosis of aorta, Cataract, Central retinal vein occlusion of left eye, CKD (chronic kidney disease) stage 3, GFR 30-59 ml/min, Diabetes mellitus, type 2, Diabetic polyneuropathy (01/06/2022), Exotropia of both eyes (02/06/2013), Hearing loss, History of resection of small bowel, Hypertensive retinopathy of both eyes, Hypoglycemia, Macular degeneration, OA (osteoarthritis) of shoulder, Osteoporosis, Posterior vitreous detachment of both eyes, Psychiatric problem, Rhinitis, and TIA (transient ischemic attack). The patient's chief complaint is long, thick toenails. This patient has documented high risk feet requiring routine maintenance secondary to diabetes mellitis and those secondary complications of diabetes, as mentioned..    PCP: Reed Ruiz MD    Date Last Seen by PCP:   Chief Complaint   Patient presents with    PCP     Reed Ruiz MD  Internal Medicine  9/21/23    Diabetic Foot Exam    Nail Care    Toe Pain     Hemoglobin A1C   Date Value Ref Range Status   04/28/2023 5.9 (H) 4.0 - 5.6 % Final     Comment:     ADA Screening Guidelines:  5.7-6.4%  Consistent with prediabetes  >or=6.5%  Consistent with diabetes    High levels of fetal hemoglobin interfere with the HbA1C  assay. Heterozygous hemoglobin variants (HbS, HgC, etc)do  not significantly interfere with this assay.   However, presence of multiple variants may affect accuracy.     01/26/2023 6.0 (H) 4.0 - 5.6 % Final     Comment:     ADA Screening Guidelines:  5.7-6.4%  Consistent with prediabetes  >or=6.5%  Consistent with diabetes    High levels of fetal hemoglobin interfere with the  "HbA1C  assay. Heterozygous hemoglobin variants (HbS, HgC, etc)do  not significantly interfere with this assay.   However, presence of multiple variants may affect accuracy.     09/20/2022 6.0 (H) 4.0 - 5.6 % Final     Comment:     ADA Screening Guidelines:  5.7-6.4%  Consistent with prediabetes  >or=6.5%  Consistent with diabetes    High levels of fetal hemoglobin interfere with the HbA1C  assay. Heterozygous hemoglobin variants (HbS, HgC, etc)do  not significantly interfere with this assay.   However, presence of multiple variants may affect accuracy.         Review of Systems   Constitutional: Negative for chills, decreased appetite and fever.   Cardiovascular:  Negative for leg swelling.   Skin:  Positive for dry skin and nail changes.   Musculoskeletal:  Positive for arthritis. Negative for joint pain, joint swelling and myalgias.   Gastrointestinal:  Negative for nausea and vomiting.   Neurological:  Negative for loss of balance, numbness and paresthesias.      Objective:     Vitals:    10/12/23 1354   BP: 121/64   Pulse: 73   Resp: 17   Weight: 75.3 kg (166 lb)   Height: 5' 5" (1.651 m)   PainSc:   4   PainLoc: Toe        Physical Exam  Constitutional:       Appearance: She is well-developed.   Cardiovascular:      Pulses:           Dorsalis pedis pulses are 1+ on the right side and 1+ on the left side.        Posterior tibial pulses are 1+ on the right side and 1+ on the left side.      Comments: Toes are cool to touch. Feet are warm proximally.There is decreased digital hair. Skin is atrophic, slightly hyperpigmented, and mildly edematous      Musculoskeletal:         General: No tenderness. Normal range of motion.      Comments: Adequate joint range of motion without pain, limitation, nor crepitation Bilateral feet and ankle joints. Muscle strength is 5/5 in all groups bilaterally.         Skin:     General: Skin is warm and dry.      Findings: No ecchymosis, erythema or lesion.      Comments: Nails x10 are " elongated by  2-4mm's, thickened by 2-5 mm's, dystrophic, and are darkened in  coloration . Xerosis Bilaterally. No open lesions noted.       Neurological:      Mental Status: She is alert and oriented to person, place, and time.      Comments: [place] 5.07 monofilamant testing is diminished Senthil feet. Sharp/dull sensation diminished Bilaterally. Light touch absent Bilaterally.       Psychiatric:         Behavior: Behavior normal.         Assessment:      Encounter Diagnoses   Name Primary?    Nail abnormalities Yes    Idiopathic peripheral neuropathy     Onychomycosis due to dermatophyte     Polyneuropathy in diseases classified elsewhere      Plan:     Jenniffer was seen today for pcp, diabetic foot exam, nail care and toe pain.    Diagnoses and all orders for this visit:    Nail abnormalities    Idiopathic peripheral neuropathy    Onychomycosis due to dermatophyte    Polyneuropathy in diseases classified elsewhere      I counseled the patient on her conditions, their implications and medical management.        - Shoe inspection. Diabetic Foot Education. Patient reminded of the importance of good nutrition and blood sugar control to help prevent podiatric complications of diabetes. Patient instructed on proper foot hygeine. We discussed wearing proper shoe gear, daily foot inspections, never walking without protective shoe gear, never putting sharp instruments to feet, routine podiatric nail visits every 2-3 months.      - With patient's permission, nails were aggressively reduced and debrided x 10 to their soft tissue attachment mechanically and with electric , removing all offending nail and debris. Patient relates relief following the procedure. She will continue to monitor the areas daily, inspect her feet, wear protective shoe gear when ambulatory, moisturizer to maintain skin integrity and follow in this office in approximately 2-3 months, sooner p.r.n.

## 2023-10-24 DIAGNOSIS — E78.00 PURE HYPERCHOLESTEROLEMIA: ICD-10-CM

## 2023-10-24 DIAGNOSIS — I10 ESSENTIAL HYPERTENSION: Chronic | ICD-10-CM

## 2023-10-24 DIAGNOSIS — I50.32 CHRONIC DIASTOLIC HEART FAILURE: ICD-10-CM

## 2023-10-24 RX ORDER — SPIRONOLACTONE 25 MG/1
25 TABLET ORAL DAILY
Qty: 90 TABLET | Refills: 3 | Status: SHIPPED | OUTPATIENT
Start: 2023-10-24 | End: 2024-03-25

## 2023-10-24 RX ORDER — PRAVASTATIN SODIUM 40 MG/1
40 TABLET ORAL DAILY
Qty: 90 TABLET | Refills: 3 | Status: SHIPPED | OUTPATIENT
Start: 2023-10-24

## 2023-10-26 ENCOUNTER — PATIENT MESSAGE (OUTPATIENT)
Dept: WOUND CARE | Facility: HOSPITAL | Age: 88
End: 2023-10-26
Payer: MEDICARE

## 2023-10-26 ENCOUNTER — PATIENT MESSAGE (OUTPATIENT)
Dept: AUDIOLOGY | Facility: CLINIC | Age: 88
End: 2023-10-26
Payer: MEDICARE

## 2023-11-16 ENCOUNTER — PATIENT MESSAGE (OUTPATIENT)
Dept: ADMINISTRATIVE | Facility: HOSPITAL | Age: 88
End: 2023-11-16
Payer: MEDICARE

## 2023-11-16 ENCOUNTER — PATIENT OUTREACH (OUTPATIENT)
Dept: ADMINISTRATIVE | Facility: HOSPITAL | Age: 88
End: 2023-11-16
Payer: MEDICARE

## 2023-11-16 NOTE — PROGRESS NOTES
Chart review done. HM updated. Immunizations reviewed & updated. Care Everywhere updated. Pt eligible for OPCM. Portal message sent.

## 2023-11-17 ENCOUNTER — HOSPITAL ENCOUNTER (OUTPATIENT)
Dept: WOUND CARE | Facility: HOSPITAL | Age: 88
Discharge: HOME | End: 2023-11-17
Attending: PREVENTIVE MEDICINE
Payer: MEDICARE

## 2023-11-17 VITALS
SYSTOLIC BLOOD PRESSURE: 115 MMHG | DIASTOLIC BLOOD PRESSURE: 55 MMHG | WEIGHT: 166 LBS | BODY MASS INDEX: 27.66 KG/M2 | HEIGHT: 65 IN | HEART RATE: 95 BPM

## 2023-11-17 DIAGNOSIS — I87.2 VENOUS STASIS DERMATITIS OF BOTH LOWER EXTREMITIES: ICD-10-CM

## 2023-11-17 DIAGNOSIS — L97.921 NON-PRESSURE ULCER OF LOWER EXTREMITY, LIMITED TO BREAKDOWN OF SKIN, LEFT: Primary | ICD-10-CM

## 2023-11-17 PROCEDURE — 99213 OFFICE O/P EST LOW 20 MIN: CPT | Mod: HCNC

## 2023-11-17 NOTE — PROGRESS NOTES
[facility]    Subjective:       Patient ID: Jenniffer Jimenez is a 89 y.o. female.    Chief Complaint: Wound Care    Follow up for BLE dermatitis that is healed. Discharge instructions given.       Objective:     Vitals:    11/17/23 1120   BP: (!) 115/55   Pulse: 95         Physical Exam       Altered Skin Integrity 08/25/23 1446 Right lower Leg Other (comment) (Active)   08/25/23 1446   Altered Skin Integrity Present on Admission - Did Patient arrive to the hospital with altered skin?: yes   Side: Right   Orientation: lower   Location: Leg   Wound Number:    Is this injury device related?: No   Primary Wound Type: Other   Description of Altered Skin Integrity:    Ankle-Brachial Index:    Pulses:    Removal Indication and Assessment:    Wound Outcome:    (Retired) Wound Length (cm):    (Retired) Wound Width (cm):    (Retired) Depth (cm):    Wound Description (Comments):    Removal Indications:    Wound Image   11/17/23 1101   Dressing Appearance Open to air 11/17/23 1101   Drainage Amount None 11/17/23 1101   Appearance Closed/resurfaced 11/17/23 1101   Tissue loss description Not applicable 11/17/23 1101   Periwound Care Topical treatment applied 11/17/23 1101   Compression Compression Stocking (20-30 mmHg) 11/17/23 1101         Assessment/Plan:         ICD-10-CM ICD-9-CM   1. Non-pressure ulcer of lower extremity, limited to breakdown of skin, left  L97.921 707.10   2. Venous stasis dermatitis of both lower extremities  I87.2 454.1           Tissue pathology and/or culture taken:  [] Yes [x] No   Sharp debridement performed:   [] Yes [x] No   Labs ordered this visit:   [] Yes [x] No   Imaging ordered this visit:   [] Yes [x] No           Orders Placed This Encounter   Procedures    Change dressing     Healed Wound Instructions:Your wound is healed, it is extremely fragile at this stage; protect it from friction, wash it gently and pat dry.  If the wound should  re-open, please call [phone] for further instructions.        Follow up if symptoms worsen or fail to improve.            This includes face to face time and non-face to face time preparing to see the patient (eg, review of tests), obtaining and/or reviewing separately obtained history, documenting clinical information in the electronic or other health record, independently interpreting results and communicating results to the patient/family/caregiver, or care coordinator.

## 2023-11-20 ENCOUNTER — TELEPHONE (OUTPATIENT)
Dept: INTERNAL MEDICINE | Facility: CLINIC | Age: 88
End: 2023-11-20
Payer: MEDICARE

## 2023-11-20 DIAGNOSIS — I10 PRIMARY HYPERTENSION: Primary | ICD-10-CM

## 2023-11-20 NOTE — TELEPHONE ENCOUNTER
----- Message from Yamini Madrigal sent at 11/20/2023  3:42 PM CST -----  Contact: self  [phone]  Pt requesting a call stated she need digital blood pressure cuff machine stated need [facility] [address].    Please call and advise

## 2023-11-27 ENCOUNTER — TELEPHONE (OUTPATIENT)
Dept: INTERNAL MEDICINE | Facility: CLINIC | Age: 88
End: 2023-11-27
Payer: MEDICARE

## 2023-11-27 NOTE — TELEPHONE ENCOUNTER
----- Message from Sonido Aceves sent at 11/27/2023  8:54 AM CST -----  Contact: [phone]@patient  Good morning patient would like a call back to get a apt patients has a  rash under arm and hurts and thinks she has a UTI. Please give patient a call back today  patient is hearing impaired if she doesn't answer please hang up and call right back to give her time to answer [phone]

## 2023-11-27 NOTE — TELEPHONE ENCOUNTER
Spoke to pt and she was c/o of a rash, offered and appt for our location on 11/29 and she denied it because of it being with a NP. Pt told the story of her being in the hospital for months because of a NP. She never wants to see a NP again, and ask me to put a note in her chart not to see a NP. Note added. Pt was offered appt for tomorrow 11/28/23 with a MD at another location. She denied and said she has another appt later that day. Told pt she would need to see someone this week,  next available would be 12/05/2023 and we ended up booking pt on that day for 11 am. And added to the wait list for a sooner appointment. Pt felt as if her age was a reason she was being moved around. She kept saying she is discriminated by her age. I infromed the women this was not at all a reason, and I was not treating her different, I tried working with her as best I could but she denied going to  to see a doctor, and going to the ED, denied the 2 appts I offered for a sooner appt with MD,and said she only wants to see . His next available was January 2024. I told her the next available I could get her in with  on 12/05/2023. She took the appt and was still saying it was to late to see him. I told her I gave her plenty other options and she denied them all, I asked how can I help and she said she wanted to see . I informed her if her symptoms worsen she would have to go to the ED , but it would be better to see a doctor this week, and it didn't have to be , she was back and forth so much. She has appt scheduled and added to the wait list.

## 2023-12-05 ENCOUNTER — LAB VISIT (OUTPATIENT)
Dept: LAB | Facility: HOSPITAL | Age: 88
End: 2023-12-05
Attending: INTERNAL MEDICINE
Payer: MEDICARE

## 2023-12-05 ENCOUNTER — OFFICE VISIT (OUTPATIENT)
Dept: INTERNAL MEDICINE | Facility: CLINIC | Age: 88
End: 2023-12-05
Payer: MEDICARE

## 2023-12-05 VITALS
RESPIRATION RATE: 17 BRPM | HEIGHT: 65 IN | TEMPERATURE: 96 F | BODY MASS INDEX: 28.17 KG/M2 | SYSTOLIC BLOOD PRESSURE: 122 MMHG | OXYGEN SATURATION: 98 % | HEART RATE: 68 BPM | WEIGHT: 169.06 LBS | DIASTOLIC BLOOD PRESSURE: 68 MMHG

## 2023-12-05 DIAGNOSIS — R30.0 DYSURIA: Primary | ICD-10-CM

## 2023-12-05 DIAGNOSIS — B35.9 TINEA: ICD-10-CM

## 2023-12-05 DIAGNOSIS — R30.0 DYSURIA: ICD-10-CM

## 2023-12-05 LAB
BACTERIA #/AREA URNS AUTO: ABNORMAL /HPF
BILIRUB UR QL STRIP: NEGATIVE
CLARITY UR REFRACT.AUTO: ABNORMAL
COLOR UR AUTO: YELLOW
GLUCOSE UR QL STRIP: NEGATIVE
HGB UR QL STRIP: NEGATIVE
HYALINE CASTS UR QL AUTO: 12 /LPF
KETONES UR QL STRIP: NEGATIVE
LEUKOCYTE ESTERASE UR QL STRIP: ABNORMAL
MICROSCOPIC COMMENT: ABNORMAL
NITRITE UR QL STRIP: NEGATIVE
PH UR STRIP: 5 [PH] (ref 5–8)
PROT UR QL STRIP: NEGATIVE
RBC #/AREA URNS AUTO: 2 /HPF (ref 0–4)
SP GR UR STRIP: 1.01 (ref 1–1.03)
SQUAMOUS #/AREA URNS AUTO: 1 /HPF
URN SPEC COLLECT METH UR: ABNORMAL
WBC #/AREA URNS AUTO: >100 /HPF (ref 0–5)
WBC CLUMPS UR QL AUTO: ABNORMAL

## 2023-12-05 PROCEDURE — 87186 SC STD MICRODIL/AGAR DIL: CPT | Mod: HCNC | Performed by: INTERNAL MEDICINE

## 2023-12-05 PROCEDURE — 3288F PR FALLS RISK ASSESSMENT DOCUMENTED: ICD-10-PCS | Mod: HCNC,CPTII,S$GLB, | Performed by: INTERNAL MEDICINE

## 2023-12-05 PROCEDURE — 1159F PR MEDICATION LIST DOCUMENTED IN MEDICAL RECORD: ICD-10-PCS | Mod: HCNC,CPTII,S$GLB, | Performed by: INTERNAL MEDICINE

## 2023-12-05 PROCEDURE — 99214 OFFICE O/P EST MOD 30 MIN: CPT | Mod: HCNC,S$GLB,, | Performed by: INTERNAL MEDICINE

## 2023-12-05 PROCEDURE — 87086 URINE CULTURE/COLONY COUNT: CPT | Mod: HCNC | Performed by: INTERNAL MEDICINE

## 2023-12-05 PROCEDURE — 87088 URINE BACTERIA CULTURE: CPT | Mod: HCNC | Performed by: INTERNAL MEDICINE

## 2023-12-05 PROCEDURE — 1159F MED LIST DOCD IN RCRD: CPT | Mod: HCNC,CPTII,S$GLB, | Performed by: INTERNAL MEDICINE

## 2023-12-05 PROCEDURE — 87077 CULTURE AEROBIC IDENTIFY: CPT | Mod: HCNC | Performed by: INTERNAL MEDICINE

## 2023-12-05 PROCEDURE — 1101F PT FALLS ASSESS-DOCD LE1/YR: CPT | Mod: HCNC,CPTII,S$GLB, | Performed by: INTERNAL MEDICINE

## 2023-12-05 PROCEDURE — 3288F FALL RISK ASSESSMENT DOCD: CPT | Mod: HCNC,CPTII,S$GLB, | Performed by: INTERNAL MEDICINE

## 2023-12-05 PROCEDURE — 99214 PR OFFICE/OUTPT VISIT, EST, LEVL IV, 30-39 MIN: ICD-10-PCS | Mod: HCNC,S$GLB,, | Performed by: INTERNAL MEDICINE

## 2023-12-05 PROCEDURE — 81001 URINALYSIS AUTO W/SCOPE: CPT | Mod: HCNC | Performed by: INTERNAL MEDICINE

## 2023-12-05 PROCEDURE — 99999 PR PBB SHADOW E&M-EST. PATIENT-LVL IV: CPT | Mod: PBBFAC,HCNC,, | Performed by: INTERNAL MEDICINE

## 2023-12-05 PROCEDURE — 1160F RVW MEDS BY RX/DR IN RCRD: CPT | Mod: HCNC,CPTII,S$GLB, | Performed by: INTERNAL MEDICINE

## 2023-12-05 PROCEDURE — 1160F PR REVIEW ALL MEDS BY PRESCRIBER/CLIN PHARMACIST DOCUMENTED: ICD-10-PCS | Mod: HCNC,CPTII,S$GLB, | Performed by: INTERNAL MEDICINE

## 2023-12-05 PROCEDURE — 99999 PR PBB SHADOW E&M-EST. PATIENT-LVL IV: ICD-10-PCS | Mod: PBBFAC,HCNC,, | Performed by: INTERNAL MEDICINE

## 2023-12-05 PROCEDURE — 1101F PR PT FALLS ASSESS DOC 0-1 FALLS W/OUT INJ PAST YR: ICD-10-PCS | Mod: HCNC,CPTII,S$GLB, | Performed by: INTERNAL MEDICINE

## 2023-12-05 RX ORDER — FLUCONAZOLE 150 MG/1
150 TABLET ORAL
Qty: 3 TABLET | Refills: 0 | Status: SHIPPED | OUTPATIENT
Start: 2023-12-05 | End: 2024-01-27

## 2023-12-05 RX ORDER — CLOTRIMAZOLE AND BETAMETHASONE DIPROPIONATE 10; .64 MG/G; MG/G
CREAM TOPICAL 2 TIMES DAILY
Qty: 45 G | Refills: 1 | Status: SHIPPED | OUTPATIENT
Start: 2023-12-05

## 2023-12-05 RX ORDER — CEFDINIR 300 MG/1
300 CAPSULE ORAL 2 TIMES DAILY
Qty: 14 CAPSULE | Refills: 0 | Status: SHIPPED | OUTPATIENT
Start: 2023-12-05 | End: 2023-12-12

## 2023-12-05 NOTE — PROGRESS NOTES
Subjective     Patient ID: Jenniffer Jimenez is a 89 y.o. female.    Chief Complaint: Rash (Underneath Lt arm possible Cellulitis pt stated  ), Urinary Tract Infection (Itch,Frequent urination & has an odor. No vignal discharge are no abdomen pain.), Medication Refill (Pravastatin), and Breast Problem (Pt stated Lt breast leaks)    HPI  Pt c/o 1 wk of persistent UTI symptoms of increased frequency/urgency and slight burning. No back pain, hematuria, fevers/chills, N/V.     Pt also c/o itchy rash located in her left axillary area x 2 wks. It has gotten slightly worse. She has not tried any meds for relief.   Review of Systems   Constitutional:  Negative for activity change, appetite change, chills, diaphoresis, fatigue, fever and unexpected weight change.   HENT:  Negative for postnasal drip, rhinorrhea, sinus pressure/congestion, sneezing, sore throat, trouble swallowing and voice change.    Respiratory:  Negative for cough, shortness of breath and wheezing.    Cardiovascular:  Negative for chest pain, palpitations and leg swelling.   Gastrointestinal:  Negative for abdominal pain, blood in stool, constipation, diarrhea, nausea and vomiting.   Genitourinary:  Positive for dysuria, frequency and urgency. Negative for decreased urine volume, flank pain, hematuria and nocturia.   Musculoskeletal:  Negative for arthralgias and myalgias.   Integumentary:  Positive for rash (left axilla). Negative for wound.   Allergic/Immunologic: Negative for environmental allergies and food allergies.   Hematological:  Negative for adenopathy. Does not bruise/bleed easily.          Objective     Physical Exam  Constitutional:       General: She is not in acute distress.     Appearance: Normal appearance. She is well-developed. She is not diaphoretic.   HENT:      Head: Normocephalic and atraumatic.      Right Ear: External ear normal.      Left Ear: External ear normal.      Nose: Nose normal.      Mouth/Throat:      Pharynx: No  oropharyngeal exudate.   Eyes:      General: No scleral icterus.        Right eye: No discharge.         Left eye: No discharge.      Conjunctiva/sclera: Conjunctivae normal.      Pupils: Pupils are equal, round, and reactive to light.   Neck:      Vascular: No JVD.   Cardiovascular:      Rate and Rhythm: Normal rate and regular rhythm.      Pulses: Normal pulses.      Heart sounds: Normal heart sounds.   Pulmonary:      Effort: Pulmonary effort is normal. No respiratory distress.      Breath sounds: Normal breath sounds. No wheezing, rhonchi or rales.   Chest:       Abdominal:      General: Bowel sounds are normal. There is no distension.      Palpations: Abdomen is soft.      Tenderness: There is no abdominal tenderness. There is no guarding or rebound.   Musculoskeletal:      Cervical back: Neck supple.      Right lower leg: No edema.      Left lower leg: No edema.   Lymphadenopathy:      Cervical: No cervical adenopathy.   Skin:     General: Skin is warm and dry.      Coloration: Skin is not pale.      Findings: No rash.   Neurological:      General: No focal deficit present.      Mental Status: She is alert and oriented to person, place, and time.      Gait: Gait normal.   Psychiatric:         Behavior: Behavior normal.         Thought Content: Thought content normal.            Assessment and Plan     1. Dysuria  -     Urinalysis; Future  -     Urine culture; Future; Expected date: 12/05/2023  -     cefdinir (OMNICEF) 300 MG capsule; Take 1 capsule (300 mg total) by mouth 2 (two) times daily. for 7 days  Dispense: 14 capsule; Refill: 0    2. Tinea  -     fluconazole (DIFLUCAN) 150 MG Tab; Take 1 tablet (150 mg total) by mouth Every 3 (three) days.  Dispense: 3 tablet; Refill: 0  -     clotrimazole-betamethasone 1-0.05% (LOTRISONE) cream; Apply topically 2 (two) times daily.  Dispense: 45 g; Refill: 1        Dysuria- UA/Urine Cx today   -Rf Cefdinir     Tinea- Rx Diflucan/Lotrisone cream

## 2023-12-07 LAB — BACTERIA UR CULT: ABNORMAL

## 2023-12-12 NOTE — TELEPHONE ENCOUNTER
----- Message from Marty Carrasquillo MD sent at 10/26/2022  7:40 AM CDT -----  Please see derm referral  Thank you for your time.     Please contact your Oncologist if you have any questions regarding the ivig administration that you received today. Patient instructed if experience any of the symptoms following today's infusion  / to notify MD immediately or go to emergency department.     * dizziness/lightheadedness  *acute nausea/vomiting - not relieved with medication  *headache - not relieved from Tylenol/pain medication  *chest pain/pressure  *rash/itching  *shortness of breath        Drink fluids - 48oz fluids daily  Call if develop fever/ chills/ signs or symptoms of infection

## 2023-12-25 NOTE — PT/OT/SLP PROGRESS
Acute Skilled PT Services    If D/C'ed home, pt will benefit from hospital bed, w/c, and BSC.  Pt has not been able to participate with acute skilled PT services on Friday 1/27/23 and Sunday 1/29/23 due to confusion and inability to follow directions to participate with therapy and has been confined to the bed.  Thank you.    SICU

## 2024-01-03 DIAGNOSIS — I50.31 ACUTE HEART FAILURE WITH PRESERVED EJECTION FRACTION: Chronic | ICD-10-CM

## 2024-01-03 RX ORDER — FUROSEMIDE 20 MG/1
TABLET ORAL
Qty: 120 TABLET | Refills: 11 | Status: SHIPPED | OUTPATIENT
Start: 2024-01-03

## 2024-01-09 ENCOUNTER — TELEPHONE (OUTPATIENT)
Dept: OTOLARYNGOLOGY | Facility: CLINIC | Age: 89
End: 2024-01-09
Payer: MEDICARE

## 2024-01-12 ENCOUNTER — HOSPITAL ENCOUNTER (EMERGENCY)
Facility: HOSPITAL | Age: 89
Discharge: HOME | End: 2024-01-12
Attending: EMERGENCY MEDICINE
Payer: MEDICARE

## 2024-01-12 VITALS
SYSTOLIC BLOOD PRESSURE: 136 MMHG | DIASTOLIC BLOOD PRESSURE: 65 MMHG | OXYGEN SATURATION: 96 % | BODY MASS INDEX: 28.12 KG/M2 | TEMPERATURE: 99 F | HEART RATE: 72 BPM | RESPIRATION RATE: 18 BRPM | WEIGHT: 169 LBS

## 2024-01-12 DIAGNOSIS — S01.20XA OPEN WOUND OF NASAL CAVITY, INITIAL ENCOUNTER: Primary | ICD-10-CM

## 2024-01-12 PROCEDURE — 99283 EMERGENCY DEPT VISIT LOW MDM: CPT | Mod: HCNC

## 2024-01-12 RX ORDER — MUPIROCIN 20 MG/G
OINTMENT TOPICAL 3 TIMES DAILY
Qty: 15 G | Refills: 0 | Status: SHIPPED | OUTPATIENT
Start: 2024-01-12 | End: 2024-06-17 | Stop reason: SDUPTHER

## 2024-01-12 NOTE — ED NOTES
Pt ambulatory to ED room with c/o nasal pain x 1 month. Per pt she has tried prescription nasal sprays as well as qtips and spraying water in the nose. No bleeding or drainage noted. AAOx4, in NAD, respirations unlabored.

## 2024-01-12 NOTE — ED TRIAGE NOTES
"Pt arrived with c/o "small lumps" inside both nostrils worsening over the past few months.  Pt reports soreness to the lumps in her nose, denies any drainage from lumps.  Pt answering questions appropriately, speaking in complete sentences, respirations even and unlabored, [place].  Aao x 4.  "

## 2024-01-12 NOTE — DISCHARGE INSTRUCTIONS
Do not pick or scratch nose.    Use hand  regularly    You may use nasal spray for your allergies, however be careful not to irritate the affected area.    You may continue to use antibiotic ointment if symptoms persist past 1 week. Use until symptoms are resolved.    Follow up with your ENT as scheduled.      Thank you for coming in to see us at [facility]! It was nice to meet you, and I hope you feel better soon. Please feel free to return to the ER at any time should your symptoms get worse, or if you have different emergent concerns.    Our goal in the emergency department is to always give you outstanding care and exceptional service. You may receive a survey by mail or e-mail in the next week regarding your experience in our ED. We would greatly appreciate your completing and returning the survey. Your feedback provides us with a way to recognize our staff who give very good care and it helps us learn how to improve when your experience was below our aspiration of excellence.       Sincerely,    Jeffy Wheatley MD  Medical Director  Emergency Department  [facility]

## 2024-01-12 NOTE — ED PROVIDER NOTES
"Encounter Date: 1/12/2024       History     Chief Complaint   Patient presents with    Nasal Polyps     Pt complains of lump in nose x several mths, + congestion noted      HPI  This is a 89 y.o. female who has a past medical history of Amblyopia of left eye (04/10/2013), Arthritis, Atherosclerosis of aorta, Cataract, Central retinal vein occlusion of left eye, CKD (chronic kidney disease) stage 3, GFR 30-59 ml/min, Diabetes mellitus, type 2, Diabetic polyneuropathy (01/06/2022), Exotropia of both eyes (02/06/2013), Hearing loss, History of resection of small bowel, Hypertensive retinopathy of both eyes, Hypoglycemia, Macular degeneration, OA (osteoarthritis) of shoulder, Osteoporosis, Posterior vitreous detachment of both eyes, Psychiatric problem, Rhinitis, and TIA (transient ischemic attack).     The patient presents to the Emergency Department with bump/wound to her left Powers times several months, associated with congestion.  No fever, no changes to the skin other than crusting over the particular area.  Patient uses nasal spray for rhinitis, saline spray as well, without relief of the wound. No bleeding.      Review of patient's allergies indicates:   Allergen Reactions    Opioids - morphine analogues Other (See Comments)     Bowel issues; bowel obstruction    Tizanidine Other (See Comments)     "Lips were numb,  Almost passed out."    Tramadol Hallucinations    Beta-blockers (beta-adrenergic blocking agts) Other (See Comments)     Can not go on beta blockers for long period of time - due to taking allergy injections    Morphine     Opioids-meperidine and related     Ciprofloxacin Rash     Past Medical History:   Diagnosis Date    Amblyopia of left eye 04/10/2013    Arthritis     Facet arthropathy, Lumbosacral    Atherosclerosis of aorta     Cataract     Central retinal vein occlusion of left eye     CKD (chronic kidney disease) stage 3, GFR 30-59 ml/min     Diabetes mellitus, type 2     Diabetic polyneuropathy " 2022    Exotropia of both eyes 2013    recession RSR 5.0mm w/ adj; recession LR os 5.0 w/ adj; resect MR os  4.0mm    Hearing loss     History of resection of small bowel     Hypertensive retinopathy of both eyes     Hypoglycemia     Macular degeneration     OA (osteoarthritis) of shoulder     Right    Osteoporosis     Posterior vitreous detachment of both eyes     Psychiatric problem     Rhinitis     TIA (transient ischemic attack)      Past Surgical History:   Procedure Laterality Date    APPENDECTOMY      CARDIAC CATHETERIZATION      CATARACT EXTRACTION W/  INTRAOCULAR LENS IMPLANT Bilateral      SECTION, CLASSIC      CLOSURE OF LEFT ATRIAL APPENDAGE USING DEVICE N/A 2020    Procedure: Left atrial appendage closure device;  Surgeon: Abundio Curtis MD;  Location: [facility] CATH LAB;  Service: Cardiology;  Laterality: N/A;    HYSTERECTOMY      INNER EAR SURGERY      JOINT REPLACEMENT      LEFT KNEE REPLACEMENT IN  -    OOPHORECTOMY      SINUS SURGERY      STRABISMUS SURGERY  13    RSR recession 5 mm, LLR recession 5 mm and LMR resection 4mm    STRABISMUS SURGERY  2014    recess LR OD 6mm    TONSILLECTOMY      watchman surgery N/A 2020     Family History   Problem Relation Age of Onset    Hypertension Mother     Hypertension Father     Liver disease Sister     Diabetes Sister     Heart disease Sister     Diabetes Brother     Breast cancer Maternal Aunt     No Known Problems Maternal Uncle     No Known Problems Paternal Aunt     No Known Problems Paternal Uncle     No Known Problems Maternal Grandmother     No Known Problems Maternal Grandfather     No Known Problems Paternal Grandmother     No Known Problems Paternal Grandfather     No Known Problems Daughter     No Known Problems Son     Heart disease Sister     No Known Problems Son     No Known Problems Son     Cancer Neg Hx         in first degree relatives    Melanoma Neg Hx     Psoriasis Neg Hx     Lupus Neg Hx      Amblyopia Neg Hx     Blindness Neg Hx     Cataracts Neg Hx     Glaucoma Neg Hx     Macular degeneration Neg Hx     Retinal detachment Neg Hx     Strabismus Neg Hx     Stroke Neg Hx     Thyroid disease Neg Hx      Social History     Tobacco Use    Smoking status: Former     Current packs/day: 0.00     Types: Cigarettes     Quit date: 10/29/1982     Years since quittin.2     Passive exposure: Never    Smokeless tobacco: Never   Substance Use Topics    Alcohol use: Not Currently     Alcohol/week: 2.0 standard drinks of alcohol     Types: 2 Shots of liquor per week     Comment: rare    Drug use: No     Review of Systems   HENT:  Negative for nosebleeds.        Physical Exam     Initial Vitals [24 1007]   BP Pulse Resp Temp SpO2   136/65 72 18 98.7 °F (37.1 °C) 96 %      MAP       --         Physical Exam    Nursing note and vitals reviewed.  Constitutional: She appears well-developed and well-nourished. She is not diaphoretic. No distress.   HENT:   Head: Normocephalic and atraumatic.   Mouth/Throat: Oropharynx is clear and moist.   Two small areas in the left naris with appearance of a wound and crusting.  Bilateral nares have mild inflammatory changes and mucosal edema.  There is no significant discharge.  There is no evidence of epistaxis or bleeding.  There are no other masses noted.  There is no foreign body.    No sinus tenderness.  There is no erythema of the nose or face, no facial edema.   Eyes: Conjunctivae are normal.   Cardiovascular:  Normal rate, regular rhythm and intact distal pulses.           Pulmonary/Chest: No respiratory distress.   Musculoskeletal:         General: Normal range of motion.     Neurological: She is alert and oriented to person, place, and time.   Skin: Skin is warm and dry. Capillary refill takes less than 2 seconds. No rash noted. No erythema.   Psychiatric: She has a normal mood and affect.         ED Course   Procedures  Labs Reviewed - No data to display       Imaging  Results    None          Medications - No data to display  Medical Decision Making  This is an emergent evaluation of a 89 y.o.female patient with presentation of chronic wounds to the left Powers.  Patient admits to picking and scratching.  She is also applying some nasal sprays without improvement.  There is no evidence of a acute bacterial infection, facial cellulitis or otherwise.  No evidence of sinusitis..     Initial differentials include but are not limited to:  Chronic nasal wound, rhinitis, facial cellulitis, sinusitis    Plan:  Bactroban nasal ointment bilaterally x1 week.  Risks versus benefits of oral antibiotics discussed with patient and daughter at bedside.    Amount and/or Complexity of Data Reviewed  External Data Reviewed: notes.     Details:     03/05/2023:  PCP visit for UTI, prescribed antibiotics      Risk  Prescription drug management.  Diagnosis or treatment significantly limited by social determinants of health.                                      Clinical Impression:  Final diagnoses:  [S01.20XA] Open wound of nasal cavity, initial encounter (Primary)          ED Disposition Condition    Discharge Stable          ED Prescriptions       Medication Sig Dispense Start Date End Date Auth. Provider    mupirocin (BACTROBAN) 2 % ointment by Nasal route 3 (three) times daily. To both nares for 7 days 15 g 1/12/2024 1/19/2024 Jeffy Wheatley MD          Follow-up Information    None          Jeffy Wheatley MD  01/12/24 [id number]

## 2024-01-16 ENCOUNTER — PATIENT OUTREACH (OUTPATIENT)
Dept: EMERGENCY MEDICINE | Facility: HOSPITAL | Age: 89
End: 2024-01-16
Payer: MEDICARE

## 2024-01-25 ENCOUNTER — OFFICE VISIT (OUTPATIENT)
Dept: PODIATRY | Facility: CLINIC | Age: 89
End: 2024-01-25
Payer: MEDICARE

## 2024-01-25 ENCOUNTER — CLINICAL SUPPORT (OUTPATIENT)
Dept: AUDIOLOGY | Facility: CLINIC | Age: 89
End: 2024-01-25
Payer: MEDICARE

## 2024-01-25 VITALS
DIASTOLIC BLOOD PRESSURE: 60 MMHG | SYSTOLIC BLOOD PRESSURE: 109 MMHG | BODY MASS INDEX: 28.16 KG/M2 | HEIGHT: 65 IN | WEIGHT: 169 LBS | RESPIRATION RATE: 18 BRPM | HEART RATE: 67 BPM

## 2024-01-25 DIAGNOSIS — H90.A22 SENSORINEURAL HEARING LOSS (SNHL) OF LEFT EAR WITH RESTRICTED HEARING OF RIGHT EAR: Primary | ICD-10-CM

## 2024-01-25 DIAGNOSIS — G60.9 IDIOPATHIC PERIPHERAL NEUROPATHY: Primary | ICD-10-CM

## 2024-01-25 DIAGNOSIS — L60.9 NAIL ABNORMALITIES: ICD-10-CM

## 2024-01-25 PROCEDURE — 99499 UNLISTED E&M SERVICE: CPT | Mod: HCNC,S$GLB,, | Performed by: PODIATRIST

## 2024-01-25 PROCEDURE — 99499 UNLISTED E&M SERVICE: CPT | Mod: HCNC,S$GLB,, | Performed by: AUDIOLOGIST

## 2024-01-25 PROCEDURE — 99999 PR PBB SHADOW E&M-EST. PATIENT-LVL IV: CPT | Mod: PBBFAC,HCNC,, | Performed by: PODIATRIST

## 2024-01-25 PROCEDURE — 11721 DEBRIDE NAIL 6 OR MORE: CPT | Mod: Q9,HCNC,S$GLB, | Performed by: PODIATRIST

## 2024-01-25 NOTE — PROGRESS NOTES
Hearing aid Follow-up:    Ms. Abad was seen today for a hearing aid follow-up appointment. She reported she lost her left hearing aid and cshell. Ms. Abad also reported she has not been hearing well with her right hearing aid. The right hearing aid was cleaned and checked. Troubleshooting revealed the right cshell no longer fits in Ms. Abad's ear canal and protrudes significantly. Ms. Abad's right hearing aid was reprogrammed with a power dome. A loaner GLYNN with a power dome was also programmed for Ms. Abad's left ear. She reported improved speech clarity. A new impression was taken of Ms. Abad's left ear without incident. An L&D claim will be placed for the left hearing aid. Ms. Abad expressed understanding of the $300 charge. Ms. Abad will try wearing a dome in the right ear and will decide if she would like to order a new cshell at her next visit. Ms. Abad will return for f/u with an audiogram when her replacement hearing aid arrives to clinic.       Hearing Aid Information:  Phonak L30-RL   Sand Beige   LT SN: [id number]   RT SN: [id number]   Warranty Exp:  06/11/2026     cShell 4.0 Acryl   Lt SN:  [id number]   ACC: [id number]   RT SN: [id number]   ACC: [id number]   Tube Length: 1   : Power   Warranty Exp  06/13/2023

## 2024-01-25 NOTE — PROGRESS NOTES
Subjective:     Patient ID: Jenniffer Jimenez is a 89 y.o. female.    Chief Complaint: Nail Problem (Nail fungus trim thick nails )    Jenniffer is a 89 y.o. female who presents to the clinic for evaluation and treatment of high risk feet. Jenniffer has a past medical history of Amblyopia of left eye (04/10/2013), Arthritis, Atherosclerosis of aorta, Cataract, Central retinal vein occlusion of left eye, CKD (chronic kidney disease) stage 3, GFR 30-59 ml/min, Diabetes mellitus, type 2, Diabetic polyneuropathy (01/06/2022), Exotropia of both eyes (02/06/2013), Hearing loss, History of resection of small bowel, Hypertensive retinopathy of both eyes, Hypoglycemia, Macular degeneration, OA (osteoarthritis) of shoulder, Osteoporosis, Posterior vitreous detachment of both eyes, Psychiatric problem, Rhinitis, and TIA (transient ischemic attack). The patient's chief complaint is long, thick toenails. This patient has documented high risk feet requiring routine maintenance secondary to diabetes mellitis and those secondary complications of diabetes, as mentioned..    PCP: Reed Ruiz MD    Date Last Seen by PCP:   Chief Complaint   Patient presents with    Nail Problem     Nail fungus trim thick nails      Hemoglobin A1C   Date Value Ref Range Status   04/28/2023 5.9 (H) 4.0 - 5.6 % Final     Comment:     ADA Screening Guidelines:  5.7-6.4%  Consistent with prediabetes  >or=6.5%  Consistent with diabetes    High levels of fetal hemoglobin interfere with the HbA1C  assay. Heterozygous hemoglobin variants (HbS, HgC, etc)do  not significantly interfere with this assay.   However, presence of multiple variants may affect accuracy.     01/26/2023 6.0 (H) 4.0 - 5.6 % Final     Comment:     ADA Screening Guidelines:  5.7-6.4%  Consistent with prediabetes  >or=6.5%  Consistent with diabetes    High levels of fetal hemoglobin interfere with the HbA1C  assay. Heterozygous hemoglobin variants (HbS, HgC, etc)do  not significantly interfere  "with this assay.   However, presence of multiple variants may affect accuracy.     09/20/2022 6.0 (H) 4.0 - 5.6 % Final     Comment:     ADA Screening Guidelines:  5.7-6.4%  Consistent with prediabetes  >or=6.5%  Consistent with diabetes    High levels of fetal hemoglobin interfere with the HbA1C  assay. Heterozygous hemoglobin variants (HbS, HgC, etc)do  not significantly interfere with this assay.   However, presence of multiple variants may affect accuracy.         Review of Systems   Constitutional: Negative for chills, decreased appetite and fever.   Cardiovascular:  Negative for leg swelling.   Skin:  Positive for dry skin and nail changes.   Musculoskeletal:  Positive for arthritis. Negative for joint pain, joint swelling and myalgias.   Gastrointestinal:  Negative for nausea and vomiting.   Neurological:  Negative for loss of balance, numbness and paresthesias.        Objective:     Vitals:    01/25/24 1355   BP: 109/60   Pulse: 67   Resp: 18   Weight: 76.7 kg (169 lb)   Height: 5' 5" (1.651 m)   PainSc: 0-No pain        Physical Exam  Vitals reviewed.   Constitutional:       Appearance: She is well-developed.   Cardiovascular:      Pulses:           Dorsalis pedis pulses are 1+ on the right side and 1+ on the left side.        Posterior tibial pulses are 1+ on the right side and 1+ on the left side.      Comments: Toes are cool to touch. Feet are warm proximally.There is decreased digital hair. Skin is atrophic, slightly hyperpigmented, and mildly edematous      Musculoskeletal:         General: No tenderness. Normal range of motion.      Comments: Adequate joint range of motion without pain, limitation, nor crepitation Bilateral feet and ankle joints. Muscle strength is 5/5 in all groups bilaterally.         Skin:     General: Skin is warm and dry.      Coloration: Skin is not ashen or jaundiced.      Findings: No ecchymosis, erythema or lesion.      Comments: Nails x10 are elongated by  2-3mm's, thickened " by 2-5 mm's, dystrophic, and are darkened in  coloration . Xerosis Bilaterally. No open lesions noted.       Neurological:      Mental Status: She is alert and oriented to person, place, and time.      Comments: Cornelius-Anne 5.07 monofilamant testing is diminished Senthil feet. Sharp/dull sensation diminished Bilaterally. Light touch absent Bilaterally.       Psychiatric:         Behavior: Behavior normal.         Assessment:      Encounter Diagnoses   Name Primary?    Idiopathic peripheral neuropathy Yes    Nail abnormalities      Plan:     Jenniffer was seen today for nail problem.    Diagnoses and all orders for this visit:    Idiopathic peripheral neuropathy    Nail abnormalities      I counseled the patient on her conditions, their implications and medical management.        - Shoe inspection. Diabetic Foot Education. Patient reminded of the importance of good nutrition and blood sugar control to help prevent podiatric complications of diabetes. Patient instructed on proper foot hygeine. We discussed wearing proper shoe gear, daily foot inspections, never walking without protective shoe gear, never putting sharp instruments to feet, routine podiatric nail visits every 2-3 months.      - With patient's permission, nails were aggressively reduced and debrided x 10 to their soft tissue attachment mechanically  removing all offending nail and debris. Patient relates relief following the procedure. She will continue to monitor the areas daily, inspect her feet, wear protective shoe gear when ambulatory, moisturizer to maintain skin integrity and follow in this office in approximately 2-3 months, sooner p.r.n.

## 2024-01-26 ENCOUNTER — OFFICE VISIT (OUTPATIENT)
Dept: INTERNAL MEDICINE | Facility: CLINIC | Age: 89
End: 2024-01-26
Payer: MEDICARE

## 2024-01-26 VITALS
BODY MASS INDEX: 28.76 KG/M2 | HEART RATE: 81 BPM | RESPIRATION RATE: 20 BRPM | HEIGHT: 65 IN | OXYGEN SATURATION: 100 % | SYSTOLIC BLOOD PRESSURE: 126 MMHG | WEIGHT: 172.63 LBS | TEMPERATURE: 98 F | DIASTOLIC BLOOD PRESSURE: 64 MMHG
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
## 2024-01-26 PROCEDURE — 1126F AMNT PAIN NOTED NONE PRSNT: CPT | Mod: HCNC,CPTII,S$GLB, | Performed by: NURSE PRACTITIONER

## 2024-01-26 PROCEDURE — 1159F MED LIST DOCD IN RCRD: CPT | Mod: HCNC,CPTII,S$GLB, | Performed by: NURSE PRACTITIONER

## 2024-01-26 PROCEDURE — 3288F FALL RISK ASSESSMENT DOCD: CPT | Mod: HCNC,CPTII,S$GLB, | Performed by: NURSE PRACTITIONER

## 2024-01-26 PROCEDURE — 99999 PR PBB SHADOW E&M-EST. PATIENT-LVL V: CPT | Mod: PBBFAC,HCNC,, | Performed by: NURSE PRACTITIONER

## 2024-01-26 PROCEDURE — 1160F RVW MEDS BY RX/DR IN RCRD: CPT | Mod: HCNC,CPTII,S$GLB, | Performed by: NURSE PRACTITIONER

## 2024-01-26 PROCEDURE — 99213 OFFICE O/P EST LOW 20 MIN: CPT | Mod: HCNC,S$GLB,, | Performed by: NURSE PRACTITIONER

## 2024-01-26 PROCEDURE — 1101F PT FALLS ASSESS-DOCD LE1/YR: CPT | Mod: HCNC,CPTII,S$GLB, | Performed by: NURSE PRACTITIONER

## 2024-01-26 RX ORDER — AMOXICILLIN AND CLAVULANATE POTASSIUM 875; 125 MG/1; MG/1
1 TABLET, FILM COATED ORAL 2 TIMES DAILY
Qty: 5 TABLET | Refills: 0 | Status: ON HOLD | OUTPATIENT
Start: 2024-01-26 | End: 2024-02-04

## 2024-01-27 ENCOUNTER — HOSPITAL ENCOUNTER (INPATIENT)
Facility: HOSPITAL | Age: 89
LOS: 8 days | Discharge: HOME HEALTH | DRG: 580 | End: 2024-02-04
Attending: EMERGENCY MEDICINE | Admitting: FAMILY MEDICINE
Payer: MEDICARE

## 2024-01-27 DIAGNOSIS — R07.9 CHEST PAIN: ICD-10-CM

## 2024-01-27 DIAGNOSIS — L02.511: ICD-10-CM

## 2024-01-27 DIAGNOSIS — L08.9 INFECTED CAT BITE OF HAND, RIGHT, INITIAL ENCOUNTER: Primary | ICD-10-CM

## 2024-01-27 DIAGNOSIS — N30.00 ACUTE CYSTITIS WITHOUT HEMATURIA: ICD-10-CM
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
## 2024-01-27 DIAGNOSIS — L03.113 CELLULITIS OF HAND, RIGHT: ICD-10-CM
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
## 2024-01-27 DIAGNOSIS — S61.451A INFECTED CAT BITE OF HAND, RIGHT, INITIAL ENCOUNTER: Primary | ICD-10-CM

## 2024-01-27 LAB
ALBUMIN SERPL BCP-MCNC: 4 G/DL (ref 3.5–5.2)
ALP SERPL-CCNC: 110 U/L (ref 55–135)
ALT SERPL W/O P-5'-P-CCNC: 16 U/L (ref 10–44)
ANION GAP SERPL CALC-SCNC: 11 MMOL/L (ref 8–16)
AST SERPL-CCNC: 14 U/L (ref 10–40)
BACTERIA #/AREA URNS HPF: ABNORMAL /HPF
BILIRUB SERPL-MCNC: 1 MG/DL (ref 0.1–1)
BILIRUB UR QL STRIP: NEGATIVE
BUN SERPL-MCNC: 43 MG/DL (ref 10–30)
CALCIUM SERPL-MCNC: 9.3 MG/DL (ref 8.7–10.5)
CHLORIDE SERPL-SCNC: 101 MMOL/L (ref 95–110)
CLARITY UR: CLEAR
CO2 SERPL-SCNC: 26 MMOL/L (ref 23–29)
COLOR UR: YELLOW
CREAT SERPL-MCNC: 1.4 MG/DL (ref 0.5–1.4)
ERYTHROCYTE [DISTWIDTH] IN BLOOD BY AUTOMATED COUNT: 16.5 % (ref 11.5–14.5)
EST. GFR  (NO RACE VARIABLE): 36 ML/MIN/1.73 M^2
GLUCOSE SERPL-MCNC: 112 MG/DL (ref 70–110)
GLUCOSE UR QL STRIP: NEGATIVE
HCT VFR BLD AUTO: 31.3 % (ref 37–48.5)
HGB BLD-MCNC: 10.5 G/DL (ref 12–16)
HGB UR QL STRIP: NEGATIVE
KETONES UR QL STRIP: NEGATIVE
LEUKOCYTE ESTERASE UR QL STRIP: ABNORMAL
MCH RBC QN AUTO: 29.3 PG (ref 27–31)
MCHC RBC AUTO-ENTMCNC: 33.5 G/DL (ref 32–36)
MCV RBC AUTO: 87 FL (ref 82–98)
MICROSCOPIC COMMENT: ABNORMAL
NITRITE UR QL STRIP: NEGATIVE
PH UR STRIP: 7 [PH] (ref 5–8)
PLATELET # BLD AUTO: 143 K/UL (ref 150–450)
PMV BLD AUTO: 10.9 FL (ref 9.2–12.9)
POTASSIUM SERPL-SCNC: 5 MMOL/L (ref 3.5–5.1)
PROT SERPL-MCNC: 6.9 G/DL (ref 6–8.4)
PROT UR QL STRIP: ABNORMAL
RBC # BLD AUTO: 3.58 M/UL (ref 4–5.4)
RBC #/AREA URNS HPF: 3 /HPF (ref 0–4)
SODIUM SERPL-SCNC: 138 MMOL/L (ref 136–145)
SP GR UR STRIP: 1.01 (ref 1–1.03)
SQUAMOUS #/AREA URNS HPF: 0 /HPF
URN SPEC COLLECT METH UR: ABNORMAL
UROBILINOGEN UR STRIP-ACNC: NEGATIVE EU/DL
WBC # BLD AUTO: 6.85 K/UL (ref 3.9–12.7)
WBC #/AREA URNS HPF: >100 /HPF (ref 0–5)
WBC CLUMPS URNS QL MICRO: ABNORMAL

## 2024-01-27 PROCEDURE — 25000003 PHARM REV CODE 250: Mod: HCNC | Performed by: NURSE PRACTITIONER

## 2024-01-27 PROCEDURE — 96374 THER/PROPH/DIAG INJ IV PUSH: CPT

## 2024-01-27 PROCEDURE — A4216 STERILE WATER/SALINE, 10 ML: HCPCS | Mod: HCNC | Performed by: NURSE PRACTITIONER

## 2024-01-27 PROCEDURE — 81000 URINALYSIS NONAUTO W/SCOPE: CPT | Mod: HCNC | Performed by: EMERGENCY MEDICINE

## 2024-01-27 PROCEDURE — 10060 I&D ABSCESS SIMPLE/SINGLE: CPT

## 2024-01-27 PROCEDURE — 63600175 PHARM REV CODE 636 W HCPCS: Mod: HCNC | Performed by: EMERGENCY MEDICINE

## 2024-01-27 PROCEDURE — 90715 TDAP VACCINE 7 YRS/> IM: CPT | Mod: HCNC | Performed by: EMERGENCY MEDICINE

## 2024-01-27 PROCEDURE — 0H9FXZZ DRAINAGE OF RIGHT HAND SKIN, EXTERNAL APPROACH: ICD-10-PCS | Performed by: EMERGENCY MEDICINE

## 2024-01-27 PROCEDURE — 99285 EMERGENCY DEPT VISIT HI MDM: CPT | Mod: 25

## 2024-01-27 PROCEDURE — 87086 URINE CULTURE/COLONY COUNT: CPT | Mod: HCNC | Performed by: EMERGENCY MEDICINE

## 2024-01-27 PROCEDURE — 85027 COMPLETE CBC AUTOMATED: CPT | Mod: HCNC | Performed by: EMERGENCY MEDICINE

## 2024-01-27 PROCEDURE — 90471 IMMUNIZATION ADMIN: CPT | Mod: HCNC | Performed by: EMERGENCY MEDICINE

## 2024-01-27 PROCEDURE — 3E0234Z INTRODUCTION OF SERUM, TOXOID AND VACCINE INTO MUSCLE, PERCUTANEOUS APPROACH: ICD-10-PCS | Performed by: EMERGENCY MEDICINE

## 2024-01-27 PROCEDURE — 11000001 HC ACUTE MED/SURG PRIVATE ROOM: Mod: HCNC

## 2024-01-27 PROCEDURE — 87040 BLOOD CULTURE FOR BACTERIA: CPT | Mod: HCNC | Performed by: NURSE PRACTITIONER

## 2024-01-27 PROCEDURE — 25000003 PHARM REV CODE 250: Mod: HCNC | Performed by: EMERGENCY MEDICINE

## 2024-01-27 PROCEDURE — 80053 COMPREHEN METABOLIC PANEL: CPT | Mod: HCNC | Performed by: EMERGENCY MEDICINE

## 2024-01-27 RX ORDER — COVID-19 VACCINE, MRNA 0.04 MG/.418ML
INJECTION, SUSPENSION INTRAMUSCULAR
COMMUNITY
Start: 2023-10-02

## 2024-01-27 RX ORDER — ONDANSETRON 8 MG/1
8 TABLET, ORALLY DISINTEGRATING ORAL EVERY 8 HOURS PRN
Status: DISCONTINUED | OUTPATIENT
Start: 2024-01-27 | End: 2024-02-04 | Stop reason: HOSPADM

## 2024-01-27 RX ORDER — SIMETHICONE 80 MG
1 TABLET,CHEWABLE ORAL 4 TIMES DAILY PRN
Status: DISCONTINUED | OUTPATIENT
Start: 2024-01-27 | End: 2024-02-04 | Stop reason: HOSPADM

## 2024-01-27 RX ORDER — ONDANSETRON HYDROCHLORIDE 2 MG/ML
4 INJECTION, SOLUTION INTRAVENOUS EVERY 8 HOURS PRN
Status: DISCONTINUED | OUTPATIENT
Start: 2024-01-27 | End: 2024-02-04 | Stop reason: HOSPADM

## 2024-01-27 RX ORDER — ASPIRIN 81 MG/1
81 TABLET ORAL DAILY
COMMUNITY

## 2024-01-27 RX ORDER — ACETAMINOPHEN 325 MG/1
650 TABLET ORAL EVERY 4 HOURS PRN
Status: DISCONTINUED | OUTPATIENT
Start: 2024-01-27 | End: 2024-02-04 | Stop reason: HOSPADM

## 2024-01-27 RX ORDER — ALUMINUM HYDROXIDE, MAGNESIUM HYDROXIDE, AND SIMETHICONE 1200; 120; 1200 MG/30ML; MG/30ML; MG/30ML
30 SUSPENSION ORAL 4 TIMES DAILY PRN
Status: DISCONTINUED | OUTPATIENT
Start: 2024-01-27 | End: 2024-02-04 | Stop reason: HOSPADM

## 2024-01-27 RX ORDER — TALC
6 POWDER (GRAM) TOPICAL NIGHTLY PRN
Status: DISCONTINUED | OUTPATIENT
Start: 2024-01-27 | End: 2024-02-04 | Stop reason: HOSPADM

## 2024-01-27 RX ORDER — AMOXICILLIN 250 MG
1 CAPSULE ORAL 2 TIMES DAILY
Status: DISCONTINUED | OUTPATIENT
Start: 2024-01-27 | End: 2024-02-04 | Stop reason: HOSPADM

## 2024-01-27 RX ORDER — INFLUENZA A VIRUS A/VICTORIA/4897/2022 IVR-238 (H1N1) ANTIGEN (FORMALDEHYDE INACTIVATED), INFLUENZA A VIRUS A/DARWIN/6/2021 IVR-227 (H3N2) ANTIGEN (FORMALDEHYDE INACTIVATED), INFLUENZA B VIRUS B/AUSTRIA/1359417/2021 BVR-26 ANTIGEN (FORMALDEHYDE INACTIVATED), INFLUENZA B VIRUS B/PHUKET/3073/2013 BVR-1B ANTIGEN (FORMALDEHYDE INACTIVATED) 15; 15; 15; 15 UG/.5ML; UG/.5ML; UG/.5ML; UG/.5ML
INJECTION, SUSPENSION INTRAMUSCULAR
COMMUNITY
Start: 2023-10-02

## 2024-01-27 RX ORDER — IPRATROPIUM BROMIDE AND ALBUTEROL SULFATE 2.5; .5 MG/3ML; MG/3ML
3 SOLUTION RESPIRATORY (INHALATION) EVERY 4 HOURS PRN
Status: DISCONTINUED | OUTPATIENT
Start: 2024-01-27 | End: 2024-02-04 | Stop reason: HOSPADM

## 2024-01-27 RX ORDER — LIDOCAINE HYDROCHLORIDE 10 MG/ML
5 INJECTION, SOLUTION EPIDURAL; INFILTRATION; INTRACAUDAL; PERINEURAL
Status: DISCONTINUED | OUTPATIENT
Start: 2024-01-27 | End: 2024-01-27

## 2024-01-27 RX ORDER — LIDOCAINE HYDROCHLORIDE 10 MG/ML
5 INJECTION, SOLUTION EPIDURAL; INFILTRATION; INTRACAUDAL; PERINEURAL
Status: COMPLETED | OUTPATIENT
Start: 2024-01-27 | End: 2024-01-27

## 2024-01-27 RX ORDER — SODIUM CHLORIDE 0.9 % (FLUSH) 0.9 %
10 SYRINGE (ML) INJECTION EVERY 8 HOURS
Status: DISCONTINUED | OUTPATIENT
Start: 2024-01-27 | End: 2024-02-04 | Stop reason: HOSPADM

## 2024-01-27 RX ADMIN — Medication 1 ML: at 11:01

## 2024-01-27 RX ADMIN — LIDOCAINE HYDROCHLORIDE 50 MG: 10 INJECTION, SOLUTION EPIDURAL; INFILTRATION; INTRACAUDAL at 01:01

## 2024-01-27 RX ADMIN — Medication 10 ML: at 02:01

## 2024-01-27 RX ADMIN — AMPICILLIN AND SULBACTAM 3 G: 2; 1 INJECTION, POWDER, FOR SOLUTION INTRAVENOUS at 01:01

## 2024-01-27 RX ADMIN — TETANUS TOXOID, REDUCED DIPHTHERIA TOXOID AND ACELLULAR PERTUSSIS VACCINE, ADSORBED 0.5 ML: 5; 2.5; 8; 8; 2.5 SUSPENSION INTRAMUSCULAR at 11:01

## 2024-01-27 RX ADMIN — Medication 10 ML: at 09:01

## 2024-01-27 RX ADMIN — DOCUSATE SODIUM AND SENNOSIDES 1 TABLET: 8.6; 5 TABLET, FILM COATED ORAL at 09:01

## 2024-01-27 NOTE — Clinical Note
Diagnosis: Infected cat bite of hand, right, initial encounter [[id number]]   Future Attending Provider: CRESENCIO REYES [[id number]]   Reason for IP Medical Treatment  (Clinical interventions that can only be accomplished in the IP setting? ) :: IV ABX, cellulitis and UTI   I certify that Inpatient services for greater than or equal to 2 midnights are medically necessary:: Yes   Plans for Post-Acute care--if anticipated (pick the single best option):: A. No post acute care anticipated at this time

## 2024-01-27 NOTE — PHARMACY MED REC
"[facility]           Pharmacy  Admission Medication History     The home medication history was taken by Rossy Singer.      Medication history obtained from Medications listed below were obtained from: Patient/family    Based on information gathered for medication list, you may go to "Admission" then "Reconcile Home Medications" tabs to review and/or act upon those items.     The home medication list has been updated by the Pharmacy department.   Please read ALL comments highlighted in yellow.   Please address this information as you see fit.    Feel free to contact us if you have any questions or require assistance.    The medications listed below were removed from the home medication list.  Please reorder if appropriate:    Patient reports NOT TAKING the following medication(s):  Sinutab 2-30-500mg  Cleocin t lotion  Diflucan 150mg      No current facility-administered medications on file prior to encounter.     Current Outpatient Medications on File Prior to Encounter   Medication Sig Dispense Refill    acetaminophen (TYLENOL) 500 MG tablet Take 1,000 mg by mouth 2 (two) times a day.      albuterol-ipratropium (DUO-NEB) 2.5 mg-0.5 mg/3 mL nebulizer solution Take 3 mLs by nebulization every 6 (six) hours as needed for Wheezing or Shortness of Breath. Rescue 75 mL 0    amoxicillin-clavulanate 875-125mg (AUGMENTIN) 875-125 mg per tablet Take 1 tablet by mouth 2 (two) times daily. 5 tablet 0    aspirin (ECOTRIN) 81 MG EC tablet Take 81 mg by mouth once daily.      clotrimazole-betamethasone 1-0.05% (LOTRISONE) cream Apply topically 2 (two) times daily. 45 g 1    diclofenac sodium (VOLTAREN) 1 % Gel Apply 2 g topically 4 (four) times daily. Apply to right hip      ferrous sulfate 325 (65 FE) MG EC tablet Take 1 tablet (325 mg total) by mouth once daily.      furosemide (LASIX) 20 MG tablet Take 2 tablets in the a.m. for weights 155-159, take 2 tablets twice a day for weights 160 over, none for " weights less than 155 120 tablet 11    miconazole (MICOTIN) 2 % cream Per instructions 2 TIMES DAILY (route: topical)      miconazole nitrate 2% (MICOTIN) 2 % Oint Apply topically 2 (two) times daily. Coloplast Clear Antifungal ointment- (green top)- nursing to apply to perineum, inner thighs, pannus, and buttocks  g 0    pravastatin (PRAVACHOL) 40 MG tablet Take 1 tablet (40 mg total) by mouth once daily. 90 tablet 3    propylene glycol (SYSTANE COMPLETE OPHT) Apply to eye.      silver sulfADIAZINE 1% (SILVADENE) 1 % cream Apply to RLE skin breakdown twice daily (Patient taking differently: Apply topically 2 (two) times daily. Apply to RLE skin breakdown twice daily)      spironolactone (ALDACTONE) 25 MG tablet Take 1 tablet (25 mg total) by mouth once daily. 90 tablet 3    triamcinolone acetonide 0.1% (KENALOG) 0.1 % ointment Apply topically 2 (two) times daily. Use to affected areas for up to 2 weeks then take a 1 week break or decrease to 3 times weekly. Do not apply to groin or face. Apply to red scaly areas on the legs and arms 454 g 1    vitamin E 400 UNIT capsule Take 400 Units by mouth once daily.      COMIRNATY 2023-24, 12Y UP,,PF, 30 mcg/0.3 mL inection       FLUAD QUAD 2023-24,65Y UP,,PF, 60 mcg (15 mcg x 4)/0.5 mL Syrg       flucytosine (ANCOBON) 500 mg Cap Take by mouth.      fluticasone propionate (FLONASE) 50 mcg/actuation nasal spray 2 sprays (100 mcg total) by Each Nostril route once daily. (Patient not taking: Reported on 1/27/2024)  0       Please address this information as you see fit.  Feel free to contact us if you have any questions or require assistance.    Rossy Singer  [phone]                  .

## 2024-01-27 NOTE — ED PROVIDER NOTES
Emergency Department Encounter  Provider Note    Jenniffer Jimenez  [id number]  1/27/2024    Evaluation:    History Acquisition:     Chief Complaint   Patient presents with    Animal Bite     Bit by her cat on (R) hand 4 days ago. Pt. Was seen and prescribed Augmentin which she started last night (has taken 2 doses). Pt. Has a painful abscess to palm side of hand with purulent drainage. There is moderate swelling to hand       History of Present Illness:  Jenniffer Jimenez is a 89 y.o. female who has a past medical history of Amblyopia of left eye (04/10/2013), Arthritis, Atherosclerosis of aorta, Cataract, Central retinal vein occlusion of left eye, CKD (chronic kidney disease) stage 3, GFR 30-59 ml/min, Diabetes mellitus, type 2, Diabetic polyneuropathy (01/06/2022), Exotropia of both eyes (02/06/2013), Hearing loss, History of resection of small bowel, Hypertensive retinopathy of both eyes, Hypoglycemia, Macular degeneration, OA (osteoarthritis) of shoulder, Osteoporosis, Posterior vitreous detachment of both eyes, Psychiatric problem, Rhinitis, and TIA (transient ischemic attack).    The patient presents to the ED due to R hand pain and swelling.     Daughter is at bedside, who provides history.    Patient was bitten by her cat on Wednesday. She was seen by a Nurse Practitioner 2 days ago and prescribed Augmentin. She presents today due to worsening pain, swelling, and purulent drainage from the bite site. No fever, N/V, or systemic symptoms. She endorses pain to her fingers, palm, and wrist. No other prior injury or surgery. No other complaints or concerns.     Additional historians utilized:  Daughter at bedside, see HPI.     Prior medical records were reviewed:   IM visit 1/26 for cat bite. RX Augmentin.   Podiatry visit 01/25 for fungal nail infection  IM visit 12/5 for UTI, rash    The patient's list of active medical problems, social history, medications, and allergies as documented has been reviewed.      Past Medical History:   Diagnosis Date    Amblyopia of left eye 04/10/2013    Arthritis     Facet arthropathy, Lumbosacral    Atherosclerosis of aorta     Cataract     Central retinal vein occlusion of left eye     CKD (chronic kidney disease) stage 3, GFR 30-59 ml/min     Diabetes mellitus, type 2     Diabetic polyneuropathy 2022    Exotropia of both eyes 2013    recession RSR 5.0mm w/ adj; recession LR os 5.0 w/ adj; resect MR os  4.0mm    Hearing loss     History of resection of small bowel     Hypertensive retinopathy of both eyes     Hypoglycemia     Macular degeneration     OA (osteoarthritis) of shoulder     Right    Osteoporosis     Posterior vitreous detachment of both eyes     Psychiatric problem     Rhinitis     TIA (transient ischemic attack)      Past Surgical History:   Procedure Laterality Date    APPENDECTOMY      CARDIAC CATHETERIZATION      CATARACT EXTRACTION W/  INTRAOCULAR LENS IMPLANT Bilateral      SECTION, CLASSIC      CLOSURE OF LEFT ATRIAL APPENDAGE USING DEVICE N/A 2020    Procedure: Left atrial appendage closure device;  Surgeon: Abundio Curtis MD;  Location: [facility] CATH LAB;  Service: Cardiology;  Laterality: N/A;    HYSTERECTOMY      INNER EAR SURGERY      JOINT REPLACEMENT      LEFT KNEE REPLACEMENT IN  -    OOPHORECTOMY      SINUS SURGERY      STRABISMUS SURGERY  13    RSR recession 5 mm, LLR recession 5 mm and LMR resection 4mm    STRABISMUS SURGERY  2014    recess LR OD 6mm    TONSILLECTOMY      watchman surgery N/A 2020     Family History   Problem Relation Age of Onset    Hypertension Mother     Hypertension Father     Liver disease Sister     Diabetes Sister     Heart disease Sister     Diabetes Brother     Breast cancer Maternal Aunt     No Known Problems Maternal Uncle     No Known Problems Paternal Aunt     No Known Problems Paternal Uncle     No Known Problems Maternal Grandmother     No Known Problems Maternal Grandfather     No  Known Problems Paternal Grandmother     No Known Problems Paternal Grandfather     No Known Problems Daughter     No Known Problems Son     Heart disease Sister     No Known Problems Son     No Known Problems Son     Cancer Neg Hx         in first degree relatives    Melanoma Neg Hx     Psoriasis Neg Hx     Lupus Neg Hx     Amblyopia Neg Hx     Blindness Neg Hx     Cataracts Neg Hx     Glaucoma Neg Hx     Macular degeneration Neg Hx     Retinal detachment Neg Hx     Strabismus Neg Hx     Stroke Neg Hx     Thyroid disease Neg Hx      Social History     Socioeconomic History    Marital status:    Occupational History    Occupation: retired   Tobacco Use    Smoking status: Former     Current packs/day: 0.00     Types: Cigarettes     Quit date: 10/29/1982     Years since quittin.2     Passive exposure: Never    Smokeless tobacco: Never   Substance and Sexual Activity    Alcohol use: Not Currently     Alcohol/week: 2.0 standard drinks of alcohol     Types: 2 Shots of liquor per week     Comment: rare    Drug use: No    Sexual activity: Yes   Other Topics Concern    Are you pregnant or think you may be? No    Breast-feeding No     Social Determinants of Health     Financial Resource Strain: Low Risk  (2023)    Overall Financial Resource Strain (CARDIA)     Difficulty of Paying Living Expenses: Not very hard   Food Insecurity: No Food Insecurity (2023)    Hunger Vital Sign     Worried About Running Out of Food in the Last Year: Never true     Ran Out of Food in the Last Year: Never true   Transportation Needs: No Transportation Needs (2023)    PRAPARE - Transportation     Lack of Transportation (Medical): No     Lack of Transportation (Non-Medical): No   Physical Activity: Inactive (2023)    Exercise Vital Sign     Days of Exercise per Week: 0 days     Minutes of Exercise per Session: 0 min   Stress: No Stress Concern Present (2023)    Citizen of [place] Index of Occupational Health -  "Occupational Stress Questionnaire     Feeling of Stress : Only a little   Social Connections: Unknown (4/27/2023)    Social Connection and Isolation Panel [NHANES]     Frequency of Communication with Friends and Family: Twice a week     Frequency of Social Gatherings with Friends and Family: Once a week     Marital Status:    Recent Concern: Social Connections - Moderately Isolated (4/27/2023)    Social Connection and Isolation Panel [NHANES]     Frequency of Communication with Friends and Family: Twice a week     Frequency of Social Gatherings with Friends and Family: Once a week     Attends [employer] Services: Never     Active Member of Clubs or Organizations: Yes     Attends Club or Organization Meetings: Never     Marital Status:    Housing Stability: Low Risk  (4/27/2023)    Housing Stability Vital Sign     Unable to Pay for Housing in the Last Year: No     Number of Places Lived in the Last Year: 1     Unstable Housing in the Last Year: No     Review of patient's allergies indicates:   Allergen Reactions    Opioids - morphine analogues Other (See Comments)     Bowel issues; bowel obstruction    Tizanidine Other (See Comments)     "Lips were numb,  Almost passed out."    Tramadol Hallucinations    Beta-blockers (beta-adrenergic blocking agts) Other (See Comments)     Can not go on beta blockers for long period of time - due to taking allergy injections    Morphine     Opioids-meperidine and related     Ciprofloxacin Rash       Review of Systems   Musculoskeletal:  Positive for arthralgias and myalgias.   Skin:  Positive for rash and wound.         Physical Exam:     Initial Vitals [01/27/24 1056]   BP Pulse Resp Temp SpO2   127/74 87 20 98 °F (36.7 °C) 97 %      MAP       --         Physical Exam    Nursing note and vitals reviewed.  Constitutional: She appears well-developed and well-nourished. She is not diaphoretic. No distress.   HENT:   Head: Normocephalic and atraumatic.   Mouth/Throat: " Oropharynx is clear and moist.   Eyes: EOM are normal. Pupils are equal, round, and reactive to light.   Neck: No tracheal deviation present.   Cardiovascular:  Normal rate, regular rhythm, normal heart sounds and intact distal pulses.           Pulmonary/Chest: Breath sounds normal. No stridor. No respiratory distress. She has no wheezes.   Abdominal: Abdomen is soft. Bowel sounds are normal. She exhibits no distension and no mass. There is no abdominal tenderness.   Musculoskeletal:         General: No edema. Normal range of motion.        Hands:       Comments: Pustule with purulent drainage to the palmar aspect of the right thenar eminence.  Surrounding erythema, edema, and tenderness.  Swelling and limited range of motion of the palm and right wrist.     Neurological: She is alert and oriented to person, place, and time. She has normal strength. No cranial nerve deficit or sensory deficit.   Skin: Skin is warm and dry. Capillary refill takes less than 2 seconds. No pallor.   Psychiatric: She has a normal mood and affect. Her behavior is normal. Thought content normal.         Differential Diagnoses:   Based on available information and initial assessment, Differential Diagnosis includes, but is not limited to:  Cellulitis, abscess, necrotizing fasciitis, osteomyelitis, septic joint, MRSA, DVT, drug eruption, allergic/contact dermatitis, EM/SJS, viral exanthem, local trauma/contusion      ED Management:   I & D - Incision and Drainage    Date/Time: 1/27/2024 12:30 PM  Location procedure was performed: [facility] EMERGENCY DEPARTMENT    Performed by: Abrahan Boswell MD  Authorized by: Abrahan Boswell MD  Consent Done: Emergent Situation  Type: abscess  Body area: upper extremity  Location details: right hand  Anesthesia: local infiltration    Anesthesia:  Local Anesthetic: lidocaine 1% without epinephrine  Anesthetic total: 3 mL    Patient sedated: no  Scalpel size: 11  Incision type: single straight  Incision depth:  dermal  Complexity: simple  Drainage: serous and bloody  Drainage amount: scant  Wound treatment: wound left open    Incision depth: dermal          Orders Placed This Encounter    Urine culture    Blood culture    Blood culture    X-Ray Hand 3 view Right    CBC Without Differential    Comprehensive metabolic panel    Urinalysis, Reflex to Urine Culture Urine, Clean Catch    Urinalysis Microscopic    Basic Metabolic Panel (BMP)    Magnesium    CBC with Automated Differential    Diet Cardiac Standard Tray    Nursing communication    Vital Signs    Cardiac Monitoring - Adult    Weigh patient    Strict intake and output (1) Document % meals taken (2) # of urinations (3) # and consistency of BMs    Bladder scan    Notify Physician    Place sequential compression device    Full code    Inhalation Treatment Q4H PRN    EKG 12-lead    Insert saline lock    Possible Hospitalization    Admit to Inpatient    Fall precautions    Aspiration precautions    Tdap (BOOSTRIX) vaccine injection 0.5 mL    LETS (LIDOcaine-TETRAcaine-EPINEPHrine) gel solution 1 mL    ampicillin-sulbactam (UNASYN) 3 g in sodium chloride 0.9 % 100 mL IVPB (MB+)    LIDOcaine (PF) 10 mg/ml (1%) injection 50 mg    sodium chloride 0.9% flush 10 mL    albuterol-ipratropium 2.5 mg-0.5 mg/3 mL nebulizer solution 3 mL    melatonin tablet 6 mg    ondansetron disintegrating tablet 8 mg    ondansetron injection 4 mg    senna-docusate 8.6-50 mg per tablet 1 tablet    simethicone chewable tablet 80 mg    aluminum-magnesium hydroxide-simethicone 200-200-20 mg/5 mL suspension 30 mL    acetaminophen tablet 650 mg    ampicillin-sulbactam (UNASYN) 3 g in sodium chloride 0.9 % 100 mL IVPB (MB+)    IP VTE HIGH RISK PATIENT    Progressive Mobility Protocol (mobilize patient to their highest level of functioning at least twice daily)          EKG:       Labs:     Labs Reviewed   CBC WITHOUT DIFFERENTIAL - Abnormal; Notable for the following components:       Result Value    RBC  3.58 (*)     Hemoglobin 10.5 (*)     Hematocrit 31.3 (*)     RDW 16.5 (*)     Platelets 143 (*)     All other components within normal limits   COMPREHENSIVE METABOLIC PANEL - Abnormal; Notable for the following components:    Glucose 112 (*)     BUN 43 (*)     eGFR 36 (*)     All other components within normal limits   URINALYSIS, REFLEX TO URINE CULTURE - Abnormal; Notable for the following components:    Protein, UA Trace (*)     Leukocytes, UA 3+ (*)     All other components within normal limits    Narrative:     Specimen Source->Urine   URINALYSIS MICROSCOPIC - Abnormal; Notable for the following components:    WBC, UA >100 (*)     WBC Clumps, UA Few (*)     All other components within normal limits    Narrative:     Specimen Source->Urine   CULTURE, URINE     Independent review of the labs ordered include:   See ED course  CMP unremarkable     Imaging:     Imaging Results              X-Ray Hand 3 view Right (Final result)  Result time 01/27/24 12:35:56      Final result by Luis Goodwin MD (01/27/24 12:35:56)                   Impression:      No acute findings.      Electronically signed by: Luis Goodwin MD  Date:    01/27/2024  Time:    12:35               Narrative:    EXAMINATION:  XR HAND COMPLETE 3 VIEW RIGHT    CLINICAL HISTORY:  right hand swelling;    TECHNIQUE:  PA, lateral, and oblique views of the right hand were performed.    COMPARISON:  None    FINDINGS:  Scattered degenerative changes, most prominent at the 1st CMC and triscaphe joint, noting marked joint space loss with osteophytosis.  Osteopenia noted.  No fracture identified.  Alignment within normal limits.  Prominent scattered calcific atherosclerosis noted.                                         Medications Given:     Medications   sodium chloride 0.9% flush 10 mL (10 mLs Intravenous Given 1/28/24 0700)   albuterol-ipratropium 2.5 mg-0.5 mg/3 mL nebulizer solution 3 mL (has no administration in time range)   melatonin tablet 6 mg  (has no administration in time range)   ondansetron disintegrating tablet 8 mg (has no administration in time range)   ondansetron injection 4 mg (has no administration in time range)   senna-docusate 8.6-50 mg per tablet 1 tablet (1 tablet Oral Given 1/28/24 0901)   simethicone chewable tablet 80 mg (has no administration in time range)   aluminum-magnesium hydroxide-simethicone 200-200-20 mg/5 mL suspension 30 mL (has no administration in time range)   acetaminophen tablet 650 mg (has no administration in time range)   ampicillin-sulbactam (UNASYN) 3 g in sodium chloride 0.9 % 100 mL IVPB (MB+) (0 g Intravenous Stopped 1/28/24 0240)   Tdap (BOOSTRIX) vaccine injection 0.5 mL (0.5 mLs Intramuscular Given 1/27/24 1130)   LETS (LIDOcaine-TETRAcaine-EPINEPHrine) gel solution 1 mL (1 mL Topical (Top) Given 1/27/24 1134)   ampicillin-sulbactam (UNASYN) 3 g in sodium chloride 0.9 % 100 mL IVPB (MB+) (0 g Intravenous Stopped 1/27/24 1431)   LIDOcaine (PF) 10 mg/ml (1%) injection 50 mg (50 mg Infiltration Given by Other 1/27/24 1304)        Medical Decision Making:    Additional Consideration:   Additional testing considered during clinical course: none    Social determinants of health considered during development of treatment plan include: poor access to care    Current co-morbidities considered which impacted clinical decision making: recurrent UTI, HTN, a-fib, CHF    Case discussed with additional provider: [facility] contacted for admission and further management of infected cat bite with abscess.    ED Course as of 01/28/24 1211   Sat Jan 27, 2024   1230 SpO2: 97 % [SS]   1230 Resp: 20 [SS]   1230 Temp Source: Oral [SS]   1230 Pulse: 87 [SS]   1230 Temp: 98 °F (36.7 °C) [SS]   1230 BP: 127/74  Vitals reassuring [SS]   1230 X-Ray Hand 3 view Right  Hand x-ray independently interpreted: no fracture or dislocation, no opaque foreign body.  Agree with radiologist interpretation.    [SS]   1313 Urinalysis, Reflex to Urine  Culture Urine, Clean Catch(!) [SS]   1314 Urinalysis Microscopic(!)  Ua consistent with UTI. IV ABX given for cellulitis, will cover UTI as well.  [SS]   1314 CBC Without Differential(!)  Unremarkable  [SS]      ED Course User Index  [SS] Abrahan Boswell MD            Medical Decision Making  89 yo F with infected cat bite, which has failed outpatient antibiotics.  Small pustule incised and drained on the palm of the right hand.  IV antibiotics given.  Labs unremarkable.  UA does show UTI.  Will admit for IV antibiotics and further management of infected cat bite.    Problems Addressed:  Abscess of palmar surface of finger of right hand: acute illness or injury  Acute cystitis without hematuria: acute illness or injury  Cellulitis of hand, right: acute illness or injury  Chest pain: self-limited or minor problem  Infected cat bite of hand, right, initial encounter: acute illness or injury    Amount and/or Complexity of Data Reviewed  Independent Historian: caregiver  External Data Reviewed: notes.  Labs: ordered. Decision-making details documented in ED Course.  Radiology: ordered and independent interpretation performed. Decision-making details documented in ED Course.    Risk  OTC drugs.  Prescription drug management.  Decision regarding hospitalization.  Diagnosis or treatment significantly limited by social determinants of health.  Minor surgery with no identified risk factors.        Clinical Impression:       ICD-10-CM ICD-9-CM   1. Infected cat bite of hand, right, initial encounter  S61.451A 882.1    L08.9 E906.3    W55.01XA    2. Cellulitis of hand, right  L03.113 682.4   3. Abscess of palmar surface of finger of right hand  L02.511 681.00   4. Acute cystitis without hematuria  N30.00 595.0   5. Chest pain  R07.9 786.50         Follow-up Information    None          ED Disposition Condition    Admit Stable              On re-evaluation, the patient's status has remained stable.  At this time, I believe the  patient should be admitted to the hospital for further evaluation and management.  The consulting physician/team agrees with plan and will admit under their service.   The patient and family were updated with test results, overall impression, and further plan of care. All questions were answered.       Abrahan Boswell MD  01/28/24 [id number]

## 2024-01-28 LAB
ANION GAP SERPL CALC-SCNC: 10 MMOL/L (ref 8–16)
BACTERIA UR CULT: NO GROWTH
BASOPHILS # BLD AUTO: 0.03 K/UL (ref 0–0.2)
BASOPHILS NFR BLD: 0.7 % (ref 0–1.9)
BUN SERPL-MCNC: 42 MG/DL (ref 10–30)
CALCIUM SERPL-MCNC: 8.8 MG/DL (ref 8.7–10.5)
CHLORIDE SERPL-SCNC: 105 MMOL/L (ref 95–110)
CO2 SERPL-SCNC: 24 MMOL/L (ref 23–29)
CREAT SERPL-MCNC: 1.4 MG/DL (ref 0.5–1.4)
DIFFERENTIAL METHOD BLD: ABNORMAL
EOSINOPHIL # BLD AUTO: 0.1 K/UL (ref 0–0.5)
EOSINOPHIL NFR BLD: 1.1 % (ref 0–8)
ERYTHROCYTE [DISTWIDTH] IN BLOOD BY AUTOMATED COUNT: 16.3 % (ref 11.5–14.5)
EST. GFR  (NO RACE VARIABLE): 36 ML/MIN/1.73 M^2
GLUCOSE SERPL-MCNC: 129 MG/DL (ref 70–110)
HCT VFR BLD AUTO: 28.1 % (ref 37–48.5)
HGB BLD-MCNC: 9.8 G/DL (ref 12–16)
IMM GRANULOCYTES # BLD AUTO: 0.05 K/UL (ref 0–0.04)
IMM GRANULOCYTES NFR BLD AUTO: 1.1 % (ref 0–0.5)
LYMPHOCYTES # BLD AUTO: 0.6 K/UL (ref 1–4.8)
LYMPHOCYTES NFR BLD: 14.2 % (ref 18–48)
MAGNESIUM SERPL-MCNC: 2.1 MG/DL (ref 1.6–2.6)
MCH RBC QN AUTO: 30 PG (ref 27–31)
MCHC RBC AUTO-ENTMCNC: 34.9 G/DL (ref 32–36)
MCV RBC AUTO: 86 FL (ref 82–98)
MONOCYTES # BLD AUTO: 0.5 K/UL (ref 0.3–1)
MONOCYTES NFR BLD: 11.3 % (ref 4–15)
NEUTROPHILS # BLD AUTO: 3.2 K/UL (ref 1.8–7.7)
NEUTROPHILS NFR BLD: 71.6 % (ref 38–73)
NRBC BLD-RTO: 0 /100 WBC
PLATELET # BLD AUTO: 129 K/UL (ref 150–450)
PMV BLD AUTO: 11.2 FL (ref 9.2–12.9)
POTASSIUM SERPL-SCNC: 4.1 MMOL/L (ref 3.5–5.1)
RBC # BLD AUTO: 3.27 M/UL (ref 4–5.4)
SODIUM SERPL-SCNC: 139 MMOL/L (ref 136–145)
WBC # BLD AUTO: 4.51 K/UL (ref 3.9–12.7)

## 2024-01-28 PROCEDURE — 94761 N-INVAS EAR/PLS OXIMETRY MLT: CPT | Mod: HCNC

## 2024-01-28 PROCEDURE — 11000001 HC ACUTE MED/SURG PRIVATE ROOM: Mod: HCNC

## 2024-01-28 PROCEDURE — 25000003 PHARM REV CODE 250: Mod: HCNC | Performed by: NURSE PRACTITIONER

## 2024-01-28 PROCEDURE — 63600175 PHARM REV CODE 636 W HCPCS: Mod: HCNC | Performed by: NURSE PRACTITIONER

## 2024-01-28 PROCEDURE — 36415 COLL VENOUS BLD VENIPUNCTURE: CPT | Mod: HCNC | Performed by: NURSE PRACTITIONER

## 2024-01-28 PROCEDURE — 80048 BASIC METABOLIC PNL TOTAL CA: CPT | Mod: HCNC | Performed by: NURSE PRACTITIONER

## 2024-01-28 PROCEDURE — 99900035 HC TECH TIME PER 15 MIN (STAT): Mod: HCNC

## 2024-01-28 PROCEDURE — 63600175 PHARM REV CODE 636 W HCPCS: Mod: HCNC | Performed by: INTERNAL MEDICINE

## 2024-01-28 PROCEDURE — 83735 ASSAY OF MAGNESIUM: CPT | Mod: HCNC | Performed by: NURSE PRACTITIONER

## 2024-01-28 PROCEDURE — 85025 COMPLETE CBC W/AUTO DIFF WBC: CPT | Mod: HCNC | Performed by: NURSE PRACTITIONER

## 2024-01-28 PROCEDURE — A4216 STERILE WATER/SALINE, 10 ML: HCPCS | Mod: HCNC | Performed by: NURSE PRACTITIONER

## 2024-01-28 RX ORDER — DIPHENHYDRAMINE HYDROCHLORIDE 50 MG/ML
12.5 INJECTION INTRAMUSCULAR; INTRAVENOUS ONCE
Status: COMPLETED | OUTPATIENT
Start: 2024-01-28 | End: 2024-01-28

## 2024-01-28 RX ADMIN — AMPICILLIN SODIUM AND SULBACTAM SODIUM 3 G: 2; 1 INJECTION, POWDER, FOR SOLUTION INTRAMUSCULAR; INTRAVENOUS at 01:01

## 2024-01-28 RX ADMIN — Medication 10 ML: at 02:01

## 2024-01-28 RX ADMIN — ACETAMINOPHEN 650 MG: 325 TABLET ORAL at 02:01

## 2024-01-28 RX ADMIN — DIPHENHYDRAMINE HYDROCHLORIDE 12.5 MG: 50 INJECTION, SOLUTION INTRAMUSCULAR; INTRAVENOUS at 10:01

## 2024-01-28 RX ADMIN — Medication 10 ML: at 07:01

## 2024-01-28 RX ADMIN — Medication 6 MG: at 09:01

## 2024-01-28 RX ADMIN — Medication 10 ML: at 10:01

## 2024-01-28 RX ADMIN — DOCUSATE SODIUM AND SENNOSIDES 1 TABLET: 8.6; 5 TABLET, FILM COATED ORAL at 09:01

## 2024-01-28 RX ADMIN — AMPICILLIN SODIUM AND SULBACTAM SODIUM 3 G: 2; 1 INJECTION, POWDER, FOR SOLUTION INTRAMUSCULAR; INTRAVENOUS at 02:01

## 2024-01-28 NOTE — HPI
Jenniffer Jimenez is a 89-year-old female with a past medical history of CKD, diabetes, TIA osteoarthritis presents with cat bite.    Patient states that about 4 or 5 days ago, patient was bit by her cat on her right hand.  She was seen by an outside provider and prescribed Augmentin which she started last night.  She has taken 2 doses so far but she has been complaining of worsening painful abscess to the palm site of her hand, which has significant drainage and swelling.  She presents for further care.    Triage vitals exam significant for tachypnea.  Review of systems is significant for tender hand with drainage.  CBC significant for normocytic anemia, thrombocytopenia.  CMP is significant for slightly increased sugar.    UA significant for elevated WBCs.  Urine culture is pending.    Blood cultures are pending.    X-ray of right hand showed no acute findings.    ED provider gave patient Tdap vaccine injection.  Patient was also started on Unasyn.    Patient admitted for hand infection.

## 2024-01-28 NOTE — ASSESSMENT & PLAN NOTE
Body mass index is 27.96 kg/m². Morbid obesity complicates all aspects of disease management from diagnostic modalities to treatment. Weight loss encouraged and health benefits explained to patient.       Nausea

## 2024-01-28 NOTE — ASSESSMENT & PLAN NOTE
Chronic, controlled. Latest blood pressure and vitals reviewed-     Temp:  [98 °F (36.7 °C)-98.7 °F (37.1 °C)]   Pulse:  [58-87]   Resp:  [18-20]   BP: ()/(46-79)   SpO2:  [94 %-98 %] .   Home meds for hypertension were reviewed and noted below.   Hypertension Medications               furosemide (LASIX) 20 MG tablet Take 2 tablets in the a.m. for weights 155-159, take 2 tablets twice a day for weights 160 over, none for weights less than 155    spironolactone (ALDACTONE) 25 MG tablet Take 1 tablet (25 mg total) by mouth once daily.            While in the hospital, will manage blood pressure as follows; Continue home antihypertensive regimen    Will utilize p.r.n. blood pressure medication only if patient's blood pressure greater than 180/110 and she develops symptoms such as worsening chest pain or shortness of breath.

## 2024-01-28 NOTE — PROGRESS NOTES
[facility] Medicine  Progress Note    Patient Name: Jenniffer Jimenez  MRN: [id number]  Patient Class: IP- Inpatient   Admission Date: 1/27/2024  Length of Stay: 1 days  Attending Physician: Courtney Cook*  Primary Care Provider: Reed Ruiz MD        Subjective:     Principal Problem:Cat scratch        HPI:  Jenniffer Jimenez is a 89-year-old female with a past medical history of CKD, diabetes, TIA osteoarthritis presents with cat bite.    Patient states that about 4 or 5 days ago, patient was bit by her cat on her right hand.  She was seen by an outside provider and prescribed Augmentin which she started last night.  She has taken 2 doses so far but she has been complaining of worsening painful abscess to the palm site of her hand, which has significant drainage and swelling.  She presents for further care.    Triage vitals exam significant for tachypnea.  Review of systems is significant for tender hand with drainage.  CBC significant for normocytic anemia, thrombocytopenia.  CMP is significant for slightly increased sugar.    UA significant for elevated WBCs.  Urine culture is pending.    Blood cultures are pending.    X-ray of right hand showed no acute findings.    ED provider gave patient Tdap vaccine injection.  Patient was also started on Unasyn.    Patient admitted for hand infection.    Overview/Hospital Course:  She was admitted to [facility]- service for further care. IV abx were ordered. Following blood cultures and urine cultures. Tdap was given in the ED. Will cont IV unasyn for now.     Interval History: seen at the bedside this am. No distress noted. Right hand wound noted with slight improvement from admission. Will cont IV unasyn and following wound and blood cultures.  Urine cx pending as well.     Review of Systems   Constitutional:  Negative for fatigue and fever.   HENT:  Negative for trouble swallowing.    Respiratory:  Negative for cough and shortness of breath.     Cardiovascular:  Negative for chest pain.   Gastrointestinal:  Negative for diarrhea, nausea and vomiting.   Genitourinary:  Positive for frequency.   Musculoskeletal:  Negative for gait problem.   Skin:  Positive for wound (right hand).   Hematological:  Bruises/bleeds easily.   Psychiatric/Behavioral:  Negative for confusion.      Objective:     Vital Signs (Most Recent):  Temp: 98.4 °F (36.9 °C) (01/28/24 0852)  Pulse: 79 (01/28/24 0852)  Resp: 18 (01/28/24 0852)  BP: 133/60 (01/28/24 0852)  SpO2: (!) 94 % (01/28/24 0852) Vital Signs (24h Range):  Temp:  [98 °F (36.7 °C)-98.7 °F (37.1 °C)] 98.4 °F (36.9 °C)  Pulse:  [58-87] 79  Resp:  [18-20] 18  SpO2:  [94 %-98 %] 94 %  BP: ()/(46-79) 133/60     Weight: 76.2 kg (167 lb 15.9 oz)  Body mass index is 27.96 kg/m².    Intake/Output Summary (Last 24 hours) at 1/28/2024 0910  Last data filed at 1/28/2024 0517  Gross per 24 hour   Intake 173.99 ml   Output 250 ml   Net -76.01 ml         Physical Exam  Vitals and nursing note reviewed.   Constitutional:       Appearance: Normal appearance.   HENT:      Head: Normocephalic and atraumatic.      Mouth/Throat:      Mouth: Mucous membranes are moist.   Eyes:      Extraocular Movements: Extraocular movements intact.   Cardiovascular:      Rate and Rhythm: Normal rate and regular rhythm.      Pulses: Normal pulses.      Heart sounds: Normal heart sounds.   Pulmonary:      Effort: No respiratory distress.      Breath sounds: Normal breath sounds. No wheezing.   Abdominal:      General: Bowel sounds are normal. There is no distension.      Palpations: Abdomen is soft.      Tenderness: There is no abdominal tenderness. There is no guarding.   Musculoskeletal:         General: Swelling (improved swelling to the right hand) and tenderness present.      Cervical back: Normal range of motion and neck supple.   Skin:     General: Skin is warm and dry.      Capillary Refill: Capillary refill takes 2 to 3 seconds.      Findings:  Bruising and erythema (right hand) present.   Neurological:      Mental Status: She is oriented to person, place, and time.   Psychiatric:         Mood and Affect: Mood normal.         Behavior: Behavior normal.             Significant Labs: All pertinent labs within the past 24 hours have been reviewed.    Significant Imaging: I have reviewed all pertinent imaging results/findings within the past 24 hours.    Assessment/Plan:      * Cat scratch  Patient noted with a cat scratch to her right hand--patient states the cat is her cat and has been for the last 14 years.  -she was seen outpatient and started on Augmentin---patient has failed outpatient therapy  -blood cultures and wound cultures pending  -will cont Unasyn while inpatient and follow cultures      Heart failure with preserved ejection fraction  TTE--4/22/23:  The left ventricle is normal in size with concentric hypertrophy and normal systolic function.  The estimated ejection fraction is 55%.  Indeterminate left ventricular diastolic function.  With normal right ventricular systolic function.  Severe left atrial enlargement.  Severe right atrial enlargement.  Mild mitral regurgitation.  Mild to moderate tricuspid regurgitation.  There is pulmonary hypertension.  The estimated PA systolic pressure is 56 mmHg.  Elevated central venous pressure (15 mmHg).  stable      Overweight (BMI 25.0-29.9)  Body mass index is 27.96 kg/m². Morbid obesity complicates all aspects of disease management from diagnostic modalities to treatment. Weight loss encouraged and health benefits explained to patient.        Chronic a-fib  ASA 81 mg PO daily  Cardiac tele   Cont monitoring      Primary hypertension  Chronic, controlled. Latest blood pressure and vitals reviewed-     Temp:  [98 °F (36.7 °C)-98.7 °F (37.1 °C)]   Pulse:  [58-87]   Resp:  [18-20]   BP: ()/(46-79)   SpO2:  [94 %-98 %] .   Home meds for hypertension were reviewed and noted below.   Hypertension  Medications               furosemide (LASIX) 20 MG tablet Take 2 tablets in the a.m. for weights 155-159, take 2 tablets twice a day for weights 160 over, none for weights less than 155    spironolactone (ALDACTONE) 25 MG tablet Take 1 tablet (25 mg total) by mouth once daily.            While in the hospital, will manage blood pressure as follows; Continue home antihypertensive regimen    Will utilize p.r.n. blood pressure medication only if patient's blood pressure greater than 180/110 and she develops symptoms such as worsening chest pain or shortness of breath.      VTE Risk Mitigation (From admission, onward)           Ordered     IP VTE HIGH RISK PATIENT  Once         01/27/24 1328     Place sequential compression device  Until discontinued         01/27/24 1328                    Discharge Planning   GILES:      Code Status: Full Code   Is the patient medically ready for discharge?:     Reason for patient still in hospital (select all that apply): Laboratory test, Treatment, Imaging, Consult recommendations, and PT / OT recommendations                     Pito Shaw NP  Department of Hospital Medicine   [facility]

## 2024-01-28 NOTE — ASSESSMENT & PLAN NOTE
Patient noted with a cat scratch to her left hand--patient states the cat is her cat and has been for the last 14 years.  -she was seen outpatient and started on Augmentin---patient has failed outpatient therapy  -blood cultures and wound cultures pending  -will start Unasyn while inpatient and follow cultures

## 2024-01-28 NOTE — PLAN OF CARE
Problem: Adult Inpatient Plan of Care  Goal: Plan of Care Review  Outcome: Ongoing, Progressing  Flowsheets (Taken 1/28/2024 1772)  Plan of Care Reviewed With: patient

## 2024-01-28 NOTE — ASSESSMENT & PLAN NOTE
Chronic, controlled. Latest blood pressure and vitals reviewed-     Temp:  [98 °F (36.7 °C)]   Pulse:  [73-87]   Resp:  [20]   BP: (127-143)/(74-79)   SpO2:  [95 %-98 %] .   Home meds for hypertension were reviewed and noted below.   Hypertension Medications               furosemide (LASIX) 20 MG tablet Take 2 tablets in the a.m. for weights 155-159, take 2 tablets twice a day for weights 160 over, none for weights less than 155    spironolactone (ALDACTONE) 25 MG tablet Take 1 tablet (25 mg total) by mouth once daily.            While in the hospital, will manage blood pressure as follows; Continue home antihypertensive regimen    Will utilize p.r.n. blood pressure medication only if patient's blood pressure greater than 180/110 and she develops symptoms such as worsening chest pain or shortness of breath.

## 2024-01-28 NOTE — PROGRESS NOTES
01/27/24 2005   Admission   Initial VN Admission Questions Complete   Communication Issues? Patient Hearing;Technical Issue   Shift   Pain Management Interventions pain management plan reviewed with patient/caregiver   Safety/Activity   Safety Promotion/Fall Prevention Fall Risk reviewed with patient/family     Admission questions completed by previous bedside nurse, Zeinab. No vidyo monitor in room. VN called pt's room telephone to explain role of VN and review plan of care. Pt is very [place] and reports she cannot hear VN on telephone. Nurse/PCT Augustine at bedside. Augustine communicated b/t pt and vn the plan of care. Pt instructed to call for needs/assist oob.

## 2024-01-28 NOTE — H&P
[facility] Medicine  History & Physical    Patient Name: Jenniffer Jimenez  MRN: [id number]  Patient Class: IP- Inpatient  Admission Date: 1/27/2024  Attending Physician: Courtney Cook*   Primary Care Provider: Reed Ruiz MD         Patient information was obtained from patient and ER records.     Subjective:     Principal Problem:Cat scratch    Chief Complaint:   Chief Complaint   Patient presents with    Animal Bite     Bit by her cat on (R) hand 4 days ago. Pt. Was seen and prescribed Augmentin which she started last night (has taken 2 doses). Pt. Has a painful abscess to palm side of hand with purulent drainage. There is moderate swelling to hand        HPI: Jenniffer Jimenez is a 89-year-old female with a past medical history of CKD, diabetes, TIA osteoarthritis presents with cat bite.    Patient states that about 4 or 5 days ago, patient was bit by her cat on her right hand.  She was seen by an outside provider and prescribed Augmentin which she started last night.  She has taken 2 doses so far but she has been complaining of worsening painful abscess to the palm site of her hand, which has significant drainage and swelling.  She presents for further care.    Triage vitals exam significant for tachypnea.  Review of systems is significant for tender hand with drainage.  CBC significant for normocytic anemia, thrombocytopenia.  CMP is significant for slightly increased sugar.    UA significant for elevated WBCs.  Urine culture is pending.    Blood cultures are pending.    X-ray of right hand showed no acute findings.    ED provider gave patient Tdap vaccine injection.  Patient was also started on Unasyn.    Patient admitted for hand infection.    Past Medical History:   Diagnosis Date    Amblyopia of left eye 04/10/2013    Arthritis     Facet arthropathy, Lumbosacral    Atherosclerosis of aorta     Cataract     Central retinal vein occlusion of left eye     CKD (chronic kidney disease)  "stage 3, GFR 30-59 ml/min     Diabetes mellitus, type 2     Diabetic polyneuropathy 2022    Exotropia of both eyes 2013    recession RSR 5.0mm w/ adj; recession LR os 5.0 w/ adj; resect MR os  4.0mm    Hearing loss     History of resection of small bowel     Hypertensive retinopathy of both eyes     Hypoglycemia     Macular degeneration     OA (osteoarthritis) of shoulder     Right    Osteoporosis     Posterior vitreous detachment of both eyes     Psychiatric problem     Rhinitis     TIA (transient ischemic attack)        Past Surgical History:   Procedure Laterality Date    APPENDECTOMY      CARDIAC CATHETERIZATION      CATARACT EXTRACTION W/  INTRAOCULAR LENS IMPLANT Bilateral      SECTION, CLASSIC      CLOSURE OF LEFT ATRIAL APPENDAGE USING DEVICE N/A 2020    Procedure: Left atrial appendage closure device;  Surgeon: Abundio Curtis MD;  Location: [facility] C[facility] LAB;  Service: Cardiology;  Laterality: N/A;    HYSTERECTOMY      INNER EAR SURGERY      JOINT REPLACEMENT      LEFT KNEE REPLACEMENT IN  -    OOPHORECTOMY      SINUS SURGERY      STRABISMUS SURGERY  13    RSR recession 5 mm, LLR recession 5 mm and LMR resection 4mm    STRABISMUS SURGERY  2014    recess LR OD 6mm    TONSILLECTOMY      watchman surgery N/A 2020       Review of patient's allergies indicates:   Allergen Reactions    Opioids - morphine analogues Other (See Comments)     Bowel issues; bowel obstruction    Tizanidine Other (See Comments)     "Lips were numb,  Almost passed out."    Tramadol Hallucinations    Beta-blockers (beta-adrenergic blocking agts) Other (See Comments)     Can not go on beta blockers for long period of time - due to taking allergy injections    Morphine     Opioids-meperidine and related     Ciprofloxacin Rash       No current facility-administered medications on file prior to encounter.     Current Outpatient Medications on File Prior to Encounter   Medication Sig    acetaminophen " (TYLENOL) 500 MG tablet Take 1,000 mg by mouth 2 (two) times a day.    albuterol-ipratropium (DUO-NEB) 2.5 mg-0.5 mg/3 mL nebulizer solution Take 3 mLs by nebulization every 6 (six) hours as needed for Wheezing or Shortness of Breath. Rescue    amoxicillin-clavulanate 875-125mg (AUGMENTIN) 875-125 mg per tablet Take 1 tablet by mouth 2 (two) times daily.    aspirin (ECOTRIN) 81 MG EC tablet Take 81 mg by mouth once daily.    clotrimazole-betamethasone 1-0.05% (LOTRISONE) cream Apply topically 2 (two) times daily.    diclofenac sodium (VOLTAREN) 1 % Gel Apply 2 g topically 4 (four) times daily. Apply to right hip    ferrous sulfate 325 (65 FE) MG EC tablet Take 1 tablet (325 mg total) by mouth once daily.    furosemide (LASIX) 20 MG tablet Take 2 tablets in the a.m. for weights 155-159, take 2 tablets twice a day for weights 160 over, none for weights less than 155    miconazole (MICOTIN) 2 % cream Per instructions 2 TIMES DAILY (route: topical)    miconazole nitrate 2% (MICOTIN) 2 % Oint Apply topically 2 (two) times daily. Coloplast Clear Antifungal ointment- (green top)- nursing to apply to perineum, inner thighs, pannus, and buttocks BID    pravastatin (PRAVACHOL) 40 MG tablet Take 1 tablet (40 mg total) by mouth once daily.    propylene glycol (SYSTANE COMPLETE OPHT) Apply to eye.    silver sulfADIAZINE 1% (SILVADENE) 1 % cream Apply to RLE skin breakdown twice daily (Patient taking differently: Apply topically 2 (two) times daily. Apply to RLE skin breakdown twice daily)    spironolactone (ALDACTONE) 25 MG tablet Take 1 tablet (25 mg total) by mouth once daily.    triamcinolone acetonide 0.1% (KENALOG) 0.1 % ointment Apply topically 2 (two) times daily. Use to affected areas for up to 2 weeks then take a 1 week break or decrease to 3 times weekly. Do not apply to groin or face. Apply to red scaly areas on the legs and arms    vitamin E 400 UNIT capsule Take 400 Units by mouth once daily.    COMIRNATY 2023-24,  12Y UP,,PF, 30 mcg/0.3 mL inection     FLUAD QUAD -24,65Y UP,,PF, 60 mcg (15 mcg x 4)/0.5 mL Syrg     flucytosine (ANCOBON) 500 mg Cap Take by mouth.    fluticasone propionate (FLONASE) 50 mcg/actuation nasal spray 2 sprays (100 mcg total) by Each Nostril route once daily. (Patient not taking: Reported on 2024)    [DISCONTINUED] aspirin (ECOTRIN) 81 MG EC tablet Take 1 tablet (81 mg total) by mouth once daily.    [DISCONTINUED] chlorpheniramine-pseudoephedrine-acetaminophen (SINUTAB) 2- mg per tablet 2 tablet 2 TIMES DAILY (route: oral)    [DISCONTINUED] clindamycin (CLEOCIN T) 1 % lotion     [DISCONTINUED] clotrimazole-betamethasone 1-0.05% (LOTRISONE) cream Apply topically once daily. To both legs    [DISCONTINUED] fluconazole (DIFLUCAN) 150 MG Tab Take 1 tablet (150 mg total) by mouth Every 3 (three) days.    [DISCONTINUED] pravastatin (PRAVACHOL) 40 MG tablet Take 1 tablet (40 mg total) by mouth once daily.    [DISCONTINUED] spironolactone (ALDACTONE) 25 MG tablet Take 0.5 tablets (12.5 mg total) by mouth once daily.    [DISCONTINUED] triamcinolone acetonide 0.025% (KENALOG) 0.025 % Oint Per instructions DAILY (route: topical)     Family History       Problem Relation (Age of Onset)    Breast cancer Maternal Aunt    Diabetes Sister, Brother    Heart disease Sister, Sister    Hypertension Mother, Father    Liver disease Sister    No Known Problems Maternal Uncle, Paternal Aunt, Paternal Uncle, Maternal Grandmother, Maternal Grandfather, Paternal Grandmother, Paternal Grandfather, Daughter, Son, Son, Son          Tobacco Use    Smoking status: Former     Current packs/day: 0.00     Types: Cigarettes     Quit date: 10/29/1982     Years since quittin.2     Passive exposure: Never    Smokeless tobacco: Never   Substance and Sexual Activity    Alcohol use: Not Currently     Alcohol/week: 2.0 standard drinks of alcohol     Types: 2 Shots of liquor per week     Comment: rare    Drug use: No    Sexual  activity: Yes     Review of Systems   Constitutional:  Negative for appetite change and chills.   HENT:  Negative for ear discharge and ear pain.    Respiratory:  Negative for cough and choking.    Cardiovascular:  Negative for chest pain and leg swelling.   Gastrointestinal:  Negative for anal bleeding and blood in stool.   Endocrine: Negative for polydipsia and polyphagia.   Genitourinary:  Negative for flank pain and hematuria.   Musculoskeletal:  Positive for myalgias. Negative for back pain and gait problem.        Hand pain   Skin:  Negative for pallor and rash.   Allergic/Immunologic: Negative for food allergies and immunocompromised state.   Neurological:  Negative for seizures and speech difficulty.   Hematological:  Negative for adenopathy. Does not bruise/bleed easily.   Psychiatric/Behavioral:  Negative for decreased concentration and dysphoric mood.      Objective:     Vital Signs (Most Recent):  Temp: 98.5 °F (36.9 °C) (01/27/24 2041)  Pulse: 69 (01/27/24 2041)  Resp: 18 (01/27/24 2041)  BP: (!) 109/53 (01/27/24 2041)  SpO2: 95 % (01/27/24 2041) Vital Signs (24h Range):  Temp:  [98 °F (36.7 °C)-98.7 °F (37.1 °C)] 98.5 °F (36.9 °C)  Pulse:  [69-87] 69  Resp:  [18-20] 18  SpO2:  [95 %-98 %] 95 %  BP: (109-143)/(53-79) 109/53     Weight: 76.2 kg (167 lb 15.9 oz)  Body mass index is 27.96 kg/m².     Physical Exam  Vitals reviewed.   Constitutional:       General: She is not in acute distress.     Appearance: She is not toxic-appearing.   HENT:      Right Ear: There is no impacted cerumen.      Left Ear: There is no impacted cerumen.      Nose: No congestion or rhinorrhea.      Mouth/Throat:      Pharynx: No oropharyngeal exudate or posterior oropharyngeal erythema.   Eyes:      General:         Right eye: No discharge.         Left eye: No discharge.   Cardiovascular:      Rate and Rhythm: Normal rate.      Heart sounds: No murmur heard.     No gallop.   Pulmonary:      Breath sounds: No wheezing or rales.  "  Abdominal:      General: There is no distension.      Tenderness: There is no abdominal tenderness.   Musculoskeletal:         General: No swelling or deformity.      Cervical back: No rigidity.   Lymphadenopathy:      Cervical: No cervical adenopathy.   Skin:     Coloration: Skin is not jaundiced.      Findings: No bruising.      Comments: Skin abscess on right hand noted on exam   Neurological:      General: No focal deficit present.      Cranial Nerves: No cranial nerve deficit.      Motor: No weakness.   Psychiatric:         Mood and Affect: Mood normal.                Significant Labs: All pertinent labs within the past 24 hours have been reviewed.  Blood Culture: No results for input(s): "LABBLOO" in the last 48 hours.  BMP:   Recent Labs   Lab 01/27/24  1304   *      K 5.0      CO2 26   BUN 43*   CREATININE 1.4   CALCIUM 9.3     CBC:   Recent Labs   Lab 01/27/24  1304   WBC 6.85   HGB 10.5*   HCT 31.3*   *     CMP:   Recent Labs   Lab 01/27/24  1304      K 5.0      CO2 26   *   BUN 43*   CREATININE 1.4   CALCIUM 9.3   PROT 6.9   ALBUMIN 4.0   BILITOT 1.0   ALKPHOS 110   AST 14   ALT 16   ANIONGAP 11     Lipase: No results for input(s): "LIPASE" in the last 48 hours.  Lipid Panel: No results for input(s): "CHOL", "HDL", "LDLCALC", "TRIG", "CHOLHDL" in the last 48 hours.  Magnesium: No results for input(s): "MG" in the last 48 hours.  TSH: No results for input(s): "TSH" in the last 4320 hours.  Urine Culture: No results for input(s): "LABURIN" in the last 48 hours.    Significant Imaging: I have reviewed all pertinent imaging results/findings within the past 24 hours.  I have reviewed and interpreted all pertinent imaging results/findings within the past 24 hours.  Assessment/Plan:     * Cat scratch  Patient noted with a cat scratch to her left hand--patient states the cat is her cat and has been for the last 14 years.  -she was seen outpatient and started on " Augmentin---patient has failed outpatient therapy  -blood cultures and wound cultures pending  -will start Unasyn while inpatient and follow cultures      Heart failure with preserved ejection fraction  TTE--4/22/23:  The left ventricle is normal in size with concentric hypertrophy and normal systolic function.  The estimated ejection fraction is 55%.  Indeterminate left ventricular diastolic function.  With normal right ventricular systolic function.  Severe left atrial enlargement.  Severe right atrial enlargement.  Mild mitral regurgitation.  Mild to moderate tricuspid regurgitation.  There is pulmonary hypertension.  The estimated PA systolic pressure is 56 mmHg.  Elevated central venous pressure (15 mmHg).  stable      Overweight (BMI 25.0-29.9)  Body mass index is 27.96 kg/m². Morbid obesity complicates all aspects of disease management from diagnostic modalities to treatment. Weight loss encouraged and health benefits explained to patient.        Chronic a-fib  ASA 81 mg PO daily  Cardiac tele   Cont monitoring      Primary hypertension  Chronic, controlled. Latest blood pressure and vitals reviewed-     Temp:  [98 °F (36.7 °C)]   Pulse:  [73-87]   Resp:  [20]   BP: (127-143)/(74-79)   SpO2:  [95 %-98 %] .   Home meds for hypertension were reviewed and noted below.   Hypertension Medications               furosemide (LASIX) 20 MG tablet Take 2 tablets in the a.m. for weights 155-159, take 2 tablets twice a day for weights 160 over, none for weights less than 155    spironolactone (ALDACTONE) 25 MG tablet Take 1 tablet (25 mg total) by mouth once daily.            While in the hospital, will manage blood pressure as follows; Continue home antihypertensive regimen    Will utilize p.r.n. blood pressure medication only if patient's blood pressure greater than 180/110 and she develops symptoms such as worsening chest pain or shortness of breath.      VTE Risk Mitigation (From admission, onward)           Ordered      IP VTE HIGH RISK PATIENT  Once         01/27/24 1328     Place sequential compression device  Until discontinued         01/27/24 1328                               Patient arrived to unit, VSS. Resp even and unlabored. Fall risk precautions reviewed.  Tele box placed. Ambulated to bathroom. Voided with out difficulties.  Denies pain to right hand. Message sent to team notifying of patient arrival to floor.    01/27/24 1834   Vital Signs   Temp 98.7 °F (37.1 °C)   Pulse 75   Resp 18   SpO2 95 %   BP (!) 123/58   MAP (mmHg) 84         Frandy Galvan MD  Department of Hospital Medicine  [place] [facility]

## 2024-01-28 NOTE — SUBJECTIVE & OBJECTIVE
Interval History: seen at the bedside this am. No distress noted. Right hand wound noted with slight improvement from admission. Will cont IV unasyn and following wound and blood cultures.  Urine cx pending as well.     Review of Systems   Constitutional:  Negative for fatigue and fever.   HENT:  Negative for trouble swallowing.    Respiratory:  Negative for cough and shortness of breath.    Cardiovascular:  Negative for chest pain.   Gastrointestinal:  Negative for diarrhea, nausea and vomiting.   Genitourinary:  Positive for frequency.   Musculoskeletal:  Negative for gait problem.   Skin:  Positive for wound (right hand).   Hematological:  Bruises/bleeds easily.   Psychiatric/Behavioral:  Negative for confusion.      Objective:     Vital Signs (Most Recent):  Temp: 98.4 °F (36.9 °C) (01/28/24 0852)  Pulse: 79 (01/28/24 0852)  Resp: 18 (01/28/24 0852)  BP: 133/60 (01/28/24 0852)  SpO2: (!) 94 % (01/28/24 0852) Vital Signs (24h Range):  Temp:  [98 °F (36.7 °C)-98.7 °F (37.1 °C)] 98.4 °F (36.9 °C)  Pulse:  [58-87] 79  Resp:  [18-20] 18  SpO2:  [94 %-98 %] 94 %  BP: ()/(46-79) 133/60     Weight: 76.2 kg (167 lb 15.9 oz)  Body mass index is 27.96 kg/m².    Intake/Output Summary (Last 24 hours) at 1/28/2024 0910  Last data filed at 1/28/2024 0517  Gross per 24 hour   Intake 173.99 ml   Output 250 ml   Net -76.01 ml         Physical Exam  Vitals and nursing note reviewed.   Constitutional:       Appearance: Normal appearance.   HENT:      Head: Normocephalic and atraumatic.      Mouth/Throat:      Mouth: Mucous membranes are moist.   Eyes:      Extraocular Movements: Extraocular movements intact.   Cardiovascular:      Rate and Rhythm: Normal rate and regular rhythm.      Pulses: Normal pulses.      Heart sounds: Normal heart sounds.   Pulmonary:      Effort: No respiratory distress.      Breath sounds: Normal breath sounds. No wheezing.   Abdominal:      General: Bowel sounds are normal. There is no distension.       Palpations: Abdomen is soft.      Tenderness: There is no abdominal tenderness. There is no guarding.   Musculoskeletal:         General: Swelling (improved swelling to the right hand) and tenderness present.      Cervical back: Normal range of motion and neck supple.   Skin:     General: Skin is warm and dry.      Capillary Refill: Capillary refill takes 2 to 3 seconds.      Findings: Bruising and erythema (right hand) present.   Neurological:      Mental Status: She is oriented to person, place, and time.   Psychiatric:         Mood and Affect: Mood normal.         Behavior: Behavior normal.             Significant Labs: All pertinent labs within the past 24 hours have been reviewed.    Significant Imaging: I have reviewed all pertinent imaging results/findings within the past 24 hours.

## 2024-01-28 NOTE — PROGRESS NOTES
Patient arrived to unit, VSS. Resp even and unlabored. Fall risk precautions reviewed.  Tele box placed. Ambulated to bathroom. Voided with out difficulties.  Denies pain to right hand. Message sent to team notifying of patient arrival to floor.    01/27/24 1834   Vital Signs   Temp 98.7 °F (37.1 °C)   Pulse 75   Resp 18   SpO2 95 %   BP (!) 123/58   MAP (mmHg) 84

## 2024-01-28 NOTE — HOSPITAL COURSE
[place] [facility] [place]. IV abx were ordered. Following blood cultures and urine cultures. Tdap was given in the ED. Will cont IV unasyn for now.   1/29-Urine cultures with no growth. Blood cultures with ngtd. Still following cultures from the hand. Will cont Unasyn for now for right hand animal scratch. Will ask hand sx to evaluate given the increased in swelling on yesterday.  1/30- Patient seen and examined today. She report feeling better today. Vital signs stable. Plan I & D of right hand today.   1/31-status post I&D of the right and postop day 1.  Following we will continue Unasyn---can deescalate once sensitivities have resulted.  2/1-POD #2 continue Unasyn, sensitivities remain pending  2/3-POD4, cont Unasyn--awaiting sensitivities   2/4/24-OK to D/C per Dr. Moran on oral augmentin. Patient with stable vitals.  orders in.

## 2024-01-28 NOTE — ASSESSMENT & PLAN NOTE
Patient noted with a cat scratch to her left hand--patient states the cat is her cat and has been for the last 14 years.  -she was seen outpatient and started on Augmentin---patient has failed outpatient therapy  -blood cultures and wound cultures pending  -will cont Unasyn while inpatient and follow cultures

## 2024-01-28 NOTE — SUBJECTIVE & OBJECTIVE
"Past Medical History:   Diagnosis Date    Amblyopia of left eye 04/10/2013    Arthritis     Facet arthropathy, Lumbosacral    Atherosclerosis of aorta     Cataract     Central retinal vein occlusion of left eye     CKD (chronic kidney disease) stage 3, GFR 30-59 ml/min     Diabetes mellitus, type 2     Diabetic polyneuropathy 2022    Exotropia of both eyes 2013    recession RSR 5.0mm w/ adj; recession LR os 5.0 w/ adj; resect MR os  4.0mm    Hearing loss     History of resection of small bowel     Hypertensive retinopathy of both eyes     Hypoglycemia     Macular degeneration     OA (osteoarthritis) of shoulder     Right    Osteoporosis     Posterior vitreous detachment of both eyes     Psychiatric problem     Rhinitis     TIA (transient ischemic attack)        Past Surgical History:   Procedure Laterality Date    APPENDECTOMY      CARDIAC CATHETERIZATION      CATARACT EXTRACTION W/  INTRAOCULAR LENS IMPLANT Bilateral      SECTION, CLASSIC      CLOSURE OF LEFT ATRIAL APPENDAGE USING DEVICE N/A 2020    Procedure: Left atrial appendage closure device;  Surgeon: Abundio Curtis MD;  Location: [facility] CATH LAB;  Service: Cardiology;  Laterality: N/A;    HYSTERECTOMY      INNER EAR SURGERY      JOINT REPLACEMENT      LEFT KNEE REPLACEMENT IN  -    OOPHORECTOMY      SINUS SURGERY      STRABISMUS SURGERY  13    RSR recession 5 mm, LLR recession 5 mm and LMR resection 4mm    STRABISMUS SURGERY  2014    recess LR OD 6mm    TONSILLECTOMY      watchman surgery N/A 2020       Review of patient's allergies indicates:   Allergen Reactions    Opioids - morphine analogues Other (See Comments)     Bowel issues; bowel obstruction    Tizanidine Other (See Comments)     "Lips were numb,  Almost passed out."    Tramadol Hallucinations    Beta-blockers (beta-adrenergic blocking agts) Other (See Comments)     Can not go on beta blockers for long period of time - due to taking allergy injections    " Morphine     Opioids-meperidine and related     Ciprofloxacin Rash       No current facility-administered medications on file prior to encounter.     Current Outpatient Medications on File Prior to Encounter   Medication Sig    acetaminophen (TYLENOL) 500 MG tablet Take 1,000 mg by mouth 2 (two) times a day.    albuterol-ipratropium (DUO-NEB) 2.5 mg-0.5 mg/3 mL nebulizer solution Take 3 mLs by nebulization every 6 (six) hours as needed for Wheezing or Shortness of Breath. Rescue    amoxicillin-clavulanate 875-125mg (AUGMENTIN) 875-125 mg per tablet Take 1 tablet by mouth 2 (two) times daily.    aspirin (ECOTRIN) 81 MG EC tablet Take 81 mg by mouth once daily.    clotrimazole-betamethasone 1-0.05% (LOTRISONE) cream Apply topically 2 (two) times daily.    diclofenac sodium (VOLTAREN) 1 % Gel Apply 2 g topically 4 (four) times daily. Apply to right hip    ferrous sulfate 325 (65 FE) MG EC tablet Take 1 tablet (325 mg total) by mouth once daily.    furosemide (LASIX) 20 MG tablet Take 2 tablets in the a.m. for weights 155-159, take 2 tablets twice a day for weights 160 over, none for weights less than 155    miconazole (MICOTIN) 2 % cream Per instructions 2 TIMES DAILY (route: topical)    miconazole nitrate 2% (MICOTIN) 2 % Oint Apply topically 2 (two) times daily. Coloplast Clear Antifungal ointment- (green top)- nursing to apply to perineum, inner thighs, pannus, and buttocks BID    pravastatin (PRAVACHOL) 40 MG tablet Take 1 tablet (40 mg total) by mouth once daily.    propylene glycol (SYSTANE COMPLETE OPHT) Apply to eye.    silver sulfADIAZINE 1% (SILVADENE) 1 % cream Apply to RLE skin breakdown twice daily (Patient taking differently: Apply topically 2 (two) times daily. Apply to RLE skin breakdown twice daily)    spironolactone (ALDACTONE) 25 MG tablet Take 1 tablet (25 mg total) by mouth once daily.    triamcinolone acetonide 0.1% (KENALOG) 0.1 % ointment Apply topically 2 (two) times daily. Use to affected  areas for up to 2 weeks then take a 1 week break or decrease to 3 times weekly. Do not apply to groin or face. Apply to red scaly areas on the legs and arms    vitamin E 400 UNIT capsule Take 400 Units by mouth once daily.    COMIRNATY , 12Y UP,,PF, 30 mcg/0.3 mL inection     FLUAD QUAD ,65Y UP,,PF, 60 mcg (15 mcg x 4)/0.5 mL Syrg     flucytosine (ANCOBON) 500 mg Cap Take by mouth.    fluticasone propionate (FLONASE) 50 mcg/actuation nasal spray 2 sprays (100 mcg total) by Each Nostril route once daily. (Patient not taking: Reported on 2024)    [DISCONTINUED] aspirin (ECOTRIN) 81 MG EC tablet Take 1 tablet (81 mg total) by mouth once daily.    [DISCONTINUED] chlorpheniramine-pseudoephedrine-acetaminophen (SINUTAB) 2- mg per tablet 2 tablet 2 TIMES DAILY (route: oral)    [DISCONTINUED] clindamycin (CLEOCIN T) 1 % lotion     [DISCONTINUED] clotrimazole-betamethasone 1-0.05% (LOTRISONE) cream Apply topically once daily. To both legs    [DISCONTINUED] fluconazole (DIFLUCAN) 150 MG Tab Take 1 tablet (150 mg total) by mouth Every 3 (three) days.    [DISCONTINUED] pravastatin (PRAVACHOL) 40 MG tablet Take 1 tablet (40 mg total) by mouth once daily.    [DISCONTINUED] spironolactone (ALDACTONE) 25 MG tablet Take 0.5 tablets (12.5 mg total) by mouth once daily.    [DISCONTINUED] triamcinolone acetonide 0.025% (KENALOG) 0.025 % Oint Per instructions DAILY (route: topical)     Family History       Problem Relation (Age of Onset)    Breast cancer Maternal Aunt    Diabetes Sister, Brother    Heart disease Sister, Sister    Hypertension Mother, Father    Liver disease Sister    No Known Problems Maternal Uncle, Paternal Aunt, Paternal Uncle, Maternal Grandmother, Maternal Grandfather, Paternal Grandmother, Paternal Grandfather, Daughter, Son, Son, Son          Tobacco Use    Smoking status: Former     Current packs/day: 0.00     Types: Cigarettes     Quit date: 10/29/1982     Years since quittin.2      Passive exposure: Never    Smokeless tobacco: Never   Substance and Sexual Activity    Alcohol use: Not Currently     Alcohol/week: 2.0 standard drinks of alcohol     Types: 2 Shots of liquor per week     Comment: rare    Drug use: No    Sexual activity: Yes     Review of Systems   Constitutional:  Negative for appetite change and chills.   HENT:  Negative for ear discharge and ear pain.    Respiratory:  Negative for cough and choking.    Cardiovascular:  Negative for chest pain and leg swelling.   Gastrointestinal:  Negative for anal bleeding and blood in stool.   Endocrine: Negative for polydipsia and polyphagia.   Genitourinary:  Negative for flank pain and hematuria.   Musculoskeletal:  Positive for myalgias. Negative for back pain and gait problem.        Hand pain   Skin:  Negative for pallor and rash.   Allergic/Immunologic: Negative for food allergies and immunocompromised state.   Neurological:  Negative for seizures and speech difficulty.   Hematological:  Negative for adenopathy. Does not bruise/bleed easily.   Psychiatric/Behavioral:  Negative for decreased concentration and dysphoric mood.      Objective:     Vital Signs (Most Recent):  Temp: 98.5 °F (36.9 °C) (01/27/24 2041)  Pulse: 69 (01/27/24 2041)  Resp: 18 (01/27/24 2041)  BP: (!) 109/53 (01/27/24 2041)  SpO2: 95 % (01/27/24 2041) Vital Signs (24h Range):  Temp:  [98 °F (36.7 °C)-98.7 °F (37.1 °C)] 98.5 °F (36.9 °C)  Pulse:  [69-87] 69  Resp:  [18-20] 18  SpO2:  [95 %-98 %] 95 %  BP: (109-143)/(53-79) 109/53     Weight: 76.2 kg (167 lb 15.9 oz)  Body mass index is 27.96 kg/m².     Physical Exam  Vitals reviewed.   Constitutional:       General: She is not in acute distress.     Appearance: She is not toxic-appearing.   HENT:      Right Ear: There is no impacted cerumen.      Left Ear: There is no impacted cerumen.      Nose: No congestion or rhinorrhea.      Mouth/Throat:      Pharynx: No oropharyngeal exudate or posterior oropharyngeal erythema.  "  Eyes:      General:         Right eye: No discharge.         Left eye: No discharge.   Cardiovascular:      Rate and Rhythm: Normal rate.      Heart sounds: No murmur heard.     No gallop.   Pulmonary:      Breath sounds: No wheezing or rales.   Abdominal:      General: There is no distension.      Tenderness: There is no abdominal tenderness.   Musculoskeletal:         General: No swelling or deformity.      Cervical back: No rigidity.   Lymphadenopathy:      Cervical: No cervical adenopathy.   Skin:     Coloration: Skin is not jaundiced.      Findings: No bruising.      Comments: Skin abscess on right hand noted on exam   Neurological:      General: No focal deficit present.      Cranial Nerves: No cranial nerve deficit.      Motor: No weakness.   Psychiatric:         Mood and Affect: Mood normal.                Significant Labs: All pertinent labs within the past 24 hours have been reviewed.  Blood Culture: No results for input(s): "LABBLOO" in the last 48 hours.  BMP:   Recent Labs   Lab 01/27/24  1304   *      K 5.0      CO2 26   BUN 43*   CREATININE 1.4   CALCIUM 9.3     CBC:   Recent Labs   Lab 01/27/24  1304   WBC 6.85   HGB 10.5*   HCT 31.3*   *     CMP:   Recent Labs   Lab 01/27/24  1304      K 5.0      CO2 26   *   BUN 43*   CREATININE 1.4   CALCIUM 9.3   PROT 6.9   ALBUMIN 4.0   BILITOT 1.0   ALKPHOS 110   AST 14   ALT 16   ANIONGAP 11     Lipase: No results for input(s): "LIPASE" in the last 48 hours.  Lipid Panel: No results for input(s): "CHOL", "HDL", "LDLCALC", "TRIG", "CHOLHDL" in the last 48 hours.  Magnesium: No results for input(s): "MG" in the last 48 hours.  TSH: No results for input(s): "TSH" in the last 4320 hours.  Urine Culture: No results for input(s): "LABURIN" in the last 48 hours.    Significant Imaging: I have reviewed all pertinent imaging results/findings within the past 24 hours.  I have reviewed and interpreted all pertinent imaging " results/findings within the past 24 hours.

## 2024-01-28 NOTE — ASSESSMENT & PLAN NOTE
Body mass index is 27.96 kg/m². Morbid obesity complicates all aspects of disease management from diagnostic modalities to treatment. Weight loss encouraged and health benefits explained to patient.

## 2024-01-28 NOTE — ASSESSMENT & PLAN NOTE
TTE--4/22/23:  The left ventricle is normal in size with concentric hypertrophy and normal systolic function.  The estimated ejection fraction is 55%.  Indeterminate left ventricular diastolic function.  With normal right ventricular systolic function.  Severe left atrial enlargement.  Severe right atrial enlargement.  Mild mitral regurgitation.  Mild to moderate tricuspid regurgitation.  There is pulmonary hypertension.  The estimated PA systolic pressure is 56 mmHg.  Elevated central venous pressure (15 mmHg).  stable

## 2024-01-29 LAB
ANION GAP SERPL CALC-SCNC: 10 MMOL/L (ref 8–16)
BASOPHILS # BLD AUTO: 0.02 K/UL (ref 0–0.2)
BASOPHILS NFR BLD: 0.5 % (ref 0–1.9)
BUN SERPL-MCNC: 40 MG/DL (ref 10–30)
CALCIUM SERPL-MCNC: 9 MG/DL (ref 8.7–10.5)
CHLORIDE SERPL-SCNC: 105 MMOL/L (ref 95–110)
CO2 SERPL-SCNC: 24 MMOL/L (ref 23–29)
CREAT SERPL-MCNC: 1.5 MG/DL (ref 0.5–1.4)
DIFFERENTIAL METHOD BLD: ABNORMAL
EOSINOPHIL # BLD AUTO: 0.1 K/UL (ref 0–0.5)
EOSINOPHIL NFR BLD: 1.7 % (ref 0–8)
ERYTHROCYTE [DISTWIDTH] IN BLOOD BY AUTOMATED COUNT: 16 % (ref 11.5–14.5)
EST. GFR  (NO RACE VARIABLE): 33 ML/MIN/1.73 M^2
GLUCOSE SERPL-MCNC: 127 MG/DL (ref 70–110)
HCT VFR BLD AUTO: 28 % (ref 37–48.5)
HGB BLD-MCNC: 9.6 G/DL (ref 12–16)
IMM GRANULOCYTES # BLD AUTO: 0.02 K/UL (ref 0–0.04)
IMM GRANULOCYTES NFR BLD AUTO: 0.5 % (ref 0–0.5)
LYMPHOCYTES # BLD AUTO: 0.8 K/UL (ref 1–4.8)
LYMPHOCYTES NFR BLD: 18.3 % (ref 18–48)
MAGNESIUM SERPL-MCNC: 2.1 MG/DL (ref 1.6–2.6)
MCH RBC QN AUTO: 29.8 PG (ref 27–31)
MCHC RBC AUTO-ENTMCNC: 34.3 G/DL (ref 32–36)
MCV RBC AUTO: 87 FL (ref 82–98)
MONOCYTES # BLD AUTO: 0.6 K/UL (ref 0.3–1)
MONOCYTES NFR BLD: 13.8 % (ref 4–15)
NEUTROPHILS # BLD AUTO: 2.8 K/UL (ref 1.8–7.7)
NEUTROPHILS NFR BLD: 65.2 % (ref 38–73)
NRBC BLD-RTO: 0 /100 WBC
PLATELET # BLD AUTO: 128 K/UL (ref 150–450)
PMV BLD AUTO: 10.3 FL (ref 9.2–12.9)
POTASSIUM SERPL-SCNC: 4.3 MMOL/L (ref 3.5–5.1)
RBC # BLD AUTO: 3.22 M/UL (ref 4–5.4)
SODIUM SERPL-SCNC: 139 MMOL/L (ref 136–145)
WBC # BLD AUTO: 4.21 K/UL (ref 3.9–12.7)

## 2024-01-29 PROCEDURE — 25000003 PHARM REV CODE 250: Mod: HCNC | Performed by: NURSE PRACTITIONER

## 2024-01-29 PROCEDURE — A4216 STERILE WATER/SALINE, 10 ML: HCPCS | Mod: HCNC | Performed by: NURSE PRACTITIONER

## 2024-01-29 PROCEDURE — 63600175 PHARM REV CODE 636 W HCPCS: Mod: HCNC | Performed by: NURSE PRACTITIONER

## 2024-01-29 PROCEDURE — 63700000 PHARM REV CODE 250 ALT 637 W/O HCPCS: Mod: HCNC | Performed by: NURSE PRACTITIONER

## 2024-01-29 PROCEDURE — 11000001 HC ACUTE MED/SURG PRIVATE ROOM: Mod: HCNC

## 2024-01-29 PROCEDURE — 87070 CULTURE OTHR SPECIMN AEROBIC: CPT | Mod: HCNC | Performed by: NURSE PRACTITIONER

## 2024-01-29 PROCEDURE — 80048 BASIC METABOLIC PNL TOTAL CA: CPT | Mod: HCNC | Performed by: NURSE PRACTITIONER

## 2024-01-29 PROCEDURE — 83735 ASSAY OF MAGNESIUM: CPT | Mod: HCNC | Performed by: NURSE PRACTITIONER

## 2024-01-29 PROCEDURE — 85025 COMPLETE CBC W/AUTO DIFF WBC: CPT | Mod: HCNC | Performed by: NURSE PRACTITIONER

## 2024-01-29 PROCEDURE — 87075 CULTR BACTERIA EXCEPT BLOOD: CPT | Mod: HCNC | Performed by: NURSE PRACTITIONER

## 2024-01-29 PROCEDURE — 36415 COLL VENOUS BLD VENIPUNCTURE: CPT | Mod: HCNC | Performed by: NURSE PRACTITIONER

## 2024-01-29 PROCEDURE — 87076 CULTURE ANAEROBE IDENT EACH: CPT | Mod: HCNC | Performed by: NURSE PRACTITIONER

## 2024-01-29 RX ORDER — SPIRONOLACTONE 25 MG/1
25 TABLET ORAL DAILY
Status: DISCONTINUED | OUTPATIENT
Start: 2024-01-29 | End: 2024-02-04 | Stop reason: HOSPADM

## 2024-01-29 RX ORDER — LANOLIN ALCOHOL/MO/W.PET/CERES
1 CREAM (GRAM) TOPICAL DAILY
Status: DISCONTINUED | OUTPATIENT
Start: 2024-01-29 | End: 2024-02-04 | Stop reason: HOSPADM

## 2024-01-29 RX ORDER — PRAVASTATIN SODIUM 40 MG/1
40 TABLET ORAL DAILY
Status: DISCONTINUED | OUTPATIENT
Start: 2024-01-29 | End: 2024-02-04 | Stop reason: HOSPADM

## 2024-01-29 RX ORDER — VITAMIN E 268 MG
400 CAPSULE ORAL DAILY
Status: DISCONTINUED | OUTPATIENT
Start: 2024-01-29 | End: 2024-02-04 | Stop reason: HOSPADM

## 2024-01-29 RX ORDER — BROMPHENIRAMINE MALEATE, DEXTROMETHORPHAN HBR, PHENYLEPHRINE HCL, DIPHENHYDRAMINE HCL, PHENYLEPHRINE HCL 0.52G
3 KIT ORAL 3 TIMES DAILY PRN
COMMUNITY

## 2024-01-29 RX ORDER — ASPIRIN 81 MG/1
81 TABLET ORAL DAILY
Status: DISCONTINUED | OUTPATIENT
Start: 2024-01-29 | End: 2024-02-04 | Stop reason: HOSPADM

## 2024-01-29 RX ORDER — BROMPHENIRAMINE MALEATE, DEXTROMETHORPHAN HBR, PHENYLEPHRINE HCL, DIPHENHYDRAMINE HCL, PHENYLEPHRINE HCL 0.52G
3 KIT ORAL 3 TIMES DAILY PRN
Status: DISCONTINUED | OUTPATIENT
Start: 2024-01-29 | End: 2024-02-04 | Stop reason: HOSPADM

## 2024-01-29 RX ORDER — ENOXAPARIN SODIUM 100 MG/ML
30 INJECTION SUBCUTANEOUS EVERY 24 HOURS
Status: DISCONTINUED | OUTPATIENT
Start: 2024-01-29 | End: 2024-02-01

## 2024-01-29 RX ADMIN — Medication 400 UNITS: at 02:01

## 2024-01-29 RX ADMIN — ASPIRIN 81 MG: 81 TABLET, COATED ORAL at 02:01

## 2024-01-29 RX ADMIN — DOCUSATE SODIUM AND SENNOSIDES 1 TABLET: 8.6; 5 TABLET, FILM COATED ORAL at 08:01

## 2024-01-29 RX ADMIN — SIMETHICONE ANTIGAS 1.56 G: 125 TABLET, CHEWABLE ORAL at 12:01

## 2024-01-29 RX ADMIN — PRAVASTATIN SODIUM 40 MG: 40 TABLET ORAL at 09:01

## 2024-01-29 RX ADMIN — AMPICILLIN SODIUM AND SULBACTAM SODIUM 3 G: 2; 1 INJECTION, POWDER, FOR SOLUTION INTRAMUSCULAR; INTRAVENOUS at 01:01

## 2024-01-29 RX ADMIN — ACETAMINOPHEN 650 MG: 325 TABLET ORAL at 10:01

## 2024-01-29 RX ADMIN — AMPICILLIN SODIUM AND SULBACTAM SODIUM 3 G: 2; 1 INJECTION, POWDER, FOR SOLUTION INTRAMUSCULAR; INTRAVENOUS at 02:01

## 2024-01-29 RX ADMIN — Medication 10 ML: at 02:01

## 2024-01-29 RX ADMIN — SIMETHICONE ANTIGAS 1.56 G: 125 TABLET, CHEWABLE ORAL at 10:01

## 2024-01-29 RX ADMIN — ENOXAPARIN SODIUM 30 MG: 30 INJECTION SUBCUTANEOUS at 05:01

## 2024-01-29 RX ADMIN — SIMETHICONE ANTIGAS 1.56 G: 125 TABLET, CHEWABLE ORAL at 05:01

## 2024-01-29 RX ADMIN — Medication 10 ML: at 06:01

## 2024-01-29 RX ADMIN — DOCUSATE SODIUM AND SENNOSIDES 1 TABLET: 8.6; 5 TABLET, FILM COATED ORAL at 09:01

## 2024-01-29 RX ADMIN — FERROUS SULFATE TAB 325 MG (65 MG ELEMENTAL FE) 1 EACH: 325 (65 FE) TAB at 02:01

## 2024-01-29 RX ADMIN — Medication 10 ML: at 09:01

## 2024-01-29 RX ADMIN — SPIRONOLACTONE 25 MG: 25 TABLET, FILM COATED ORAL at 02:01

## 2024-01-29 NOTE — ASSESSMENT & PLAN NOTE
Body mass index is 28.58 kg/m². Morbid obesity complicates all aspects of disease management from diagnostic modalities to treatment. Weight loss encouraged and health benefits explained to patient.

## 2024-01-29 NOTE — PLAN OF CARE
Pt AAO x4.  VSS. Plan of care reviewed.   Pt remained afebrile throughout this shift.   IV administered per order.  Right hand wound now draining. Team updated. Orders to be entered.   Pt remained free of falls this shift.   Pt with complaints of pain to right hand this shift.   Pain meds administered as ordered.   Plan of care reviewed. Patient verbalizes understanding.   Pt moving/turing independently. Frequent weight shifting encouraged.  Patient SR on monitor.   Bed low, side rails up x 2, wheels locked, call light in reach.   Bed alarm maintained for safety.   Patient instructed to call for assistance.   Hourly rounding completed.   24 hour chart check completed.  Will continue to monitor.      Problem: Adult Inpatient Plan of Care  Goal: Plan of Care Review  Outcome: Ongoing, Progressing  Goal: Patient-Specific Goal (Individualized)  Outcome: Ongoing, Progressing  Goal: Absence of Hospital-Acquired Illness or Injury  Outcome: Ongoing, Progressing  Goal: Optimal Comfort and Wellbeing  Outcome: Ongoing, Progressing  Goal: Readiness for Transition of Care  Outcome: Ongoing, Progressing     Problem: Impaired Wound Healing  Goal: Optimal Wound Healing  Outcome: Ongoing, Progressing     Problem: Fall Injury Risk  Goal: Absence of Fall and Fall-Related Injury  Outcome: Ongoing, Progressing

## 2024-01-29 NOTE — PROGRESS NOTES
Subjective     Patient ID: Jenniffer Jimenez is a 89 y.o. female.    Chief Complaint: Animal Bite    Pt in office for evaluation and management of her cat bit she sustained while at the vet this past Wednesday. Pt took her cat to the vet and while helping to hold her for immunizations she was bitten. Pt states the vet cleaned her wound but for the past few days she has been experiencing worsening pain, and edema to the R thumb. There are 2 puncture marks one the  dorsum and plantar aspect of the R thumb. Pain has been worse with some limitations to ROM of thumb.     Animal Bite       Review of Systems   Integumentary:  Positive for wound (Cat bite on R thumb.).   All other systems reviewed and are negative.       Objective   Physical Exam  Vitals reviewed.   Constitutional:       Appearance: Normal appearance. She is obese.   HENT:      Head: Normocephalic and atraumatic.   Eyes:      Conjunctiva/sclera: Conjunctivae normal.      Pupils: Pupils are equal, round, and reactive to light.   Cardiovascular:      Rate and Rhythm: Normal rate and regular rhythm.   Pulmonary:      Effort: Pulmonary effort is normal.      Breath sounds: Normal breath sounds.   Abdominal:      General: Bowel sounds are normal.      Palpations: Abdomen is soft.   Musculoskeletal:         General: Normal range of motion.      Cervical back: Normal range of motion and neck supple.   Skin:         Neurological:      General: No focal deficit present.      Mental Status: She is alert.   Psychiatric:         Mood and Affect: Mood normal.        Assessment and Plan     1. Cat bite, initial encounter  Assessment & Plan:  Will start pt on Augmentin BID and will follow up with pt on Monday.   We had a long discussion about going to the ER this weekend if symptoms progressively worsen such as worsening pain, redness up the arm from the entry points, N/V or fever.     Sent medication to Ty at [facility]  per her request as she came to clinic in an  UBER and her options are restrictive.     Orders:  -     amoxicillin-clavulanate 875-125mg (AUGMENTIN) 875-125 mg per tablet; Take 1 tablet by mouth 2 (two) times daily.  Dispense: 5 tablet; Refill: 0      RTC PRN.

## 2024-01-29 NOTE — ASSESSMENT & PLAN NOTE
Will start pt on Augmentin BID and will follow up with pt on Monday.   We had a long discussion about going to the ER this weekend if symptoms progressively worsen such as worsening pain, redness up the arm from the entry points, N/V or fever.     Sent medication to Ty at [facility]  per her request as she came to clinic in an UBER and her options are restrictive.

## 2024-01-29 NOTE — PROGRESS NOTES
[facility] Medicine  Progress Note    Patient Name: Jenniffer Jimenez  MRN: [id number]  Patient Class: IP- Inpatient   Admission Date: 1/27/2024  Length of Stay: 2 days  Attending Physician: Courtney Cook*  Primary Care Provider: Reed Ruiz MD        Subjective:     Principal Problem:Cat scratch        HPI:  Jenniffer Jimenez is a 89-year-old female with a past medical history of CKD, diabetes, TIA osteoarthritis presents with cat bite.    Patient states that about 4 or 5 days ago, patient was bit by her cat on her right hand.  She was seen by an outside provider and prescribed Augmentin which she started last night.  She has taken 2 doses so far but she has been complaining of worsening painful abscess to the palm site of her hand, which has significant drainage and swelling.  She presents for further care.    Triage vitals exam significant for tachypnea.  Review of systems is significant for tender hand with drainage.  CBC significant for normocytic anemia, thrombocytopenia.  CMP is significant for slightly increased sugar.    UA significant for elevated WBCs.  Urine culture is pending.    Blood cultures are pending.    X-ray of right hand showed no acute findings.    ED provider gave patient Tdap vaccine injection.  Patient was also started on Unasyn.    Patient admitted for hand infection.    Overview/Hospital Course:  She was admitted to the [facility]- service for further care. IV abx were ordered. Following blood cultures and urine cultures. Tdap was given in the ED. Will cont IV unasyn for now.   1/29-Urine cultures with no growth. Blood cultures with ngtd. Still following cultures from the hand. Will cont Unasyn for now for right hand animal scratch. Will ask hand sx to evaluate given the increased in swelling on yesterday.    Interval History: seen at the bedside this am. No distress noted. Right hand wound noted with slight improvement from admission. Will cont IV unasyn and following  wound and blood cultures--BC with ngtd from 1/27.  Urine cx with no growth    Review of Systems   Constitutional:  Negative for fatigue and fever.   HENT:  Negative for trouble swallowing.    Respiratory:  Negative for cough and shortness of breath.    Cardiovascular:  Negative for chest pain.   Gastrointestinal:  Negative for diarrhea, nausea and vomiting.   Genitourinary:  Positive for frequency.   Musculoskeletal:  Negative for gait problem.   Skin:  Positive for wound (right hand).   Hematological:  Bruises/bleeds easily.   Psychiatric/Behavioral:  Negative for confusion.      Objective:     Vital Signs (Most Recent):  Temp: 98.6 °F (37 °C) (01/29/24 0743)  Pulse: 70 (01/29/24 0743)  Resp: 18 (01/29/24 0743)  BP: (!) 119/57 (01/29/24 0743)  SpO2: (!) 92 % (01/29/24 0743) Vital Signs (24h Range):  Temp:  [97.7 °F (36.5 °C)-98.6 °F (37 °C)] 98.6 °F (37 °C)  Pulse:  [57-75] 70  Resp:  [17-20] 18  SpO2:  [92 %-96 %] 92 %  BP: (105-150)/(53-65) 119/57     Weight: 77.9 kg (171 lb 11.8 oz)  Body mass index is 28.58 kg/m².    Intake/Output Summary (Last 24 hours) at 1/29/2024 1111  Last data filed at 1/29/2024 0621  Gross per 24 hour   Intake 530 ml   Output 1550 ml   Net -1020 ml           Physical Exam  Vitals and nursing note reviewed.   Constitutional:       Appearance: Normal appearance.   HENT:      Head: Normocephalic and atraumatic.      Mouth/Throat:      Mouth: Mucous membranes are moist.   Eyes:      Extraocular Movements: Extraocular movements intact.   Cardiovascular:      Rate and Rhythm: Normal rate and regular rhythm.      Pulses: Normal pulses.      Heart sounds: Normal heart sounds.   Pulmonary:      Effort: No respiratory distress.      Breath sounds: Normal breath sounds. No wheezing.   Abdominal:      General: Bowel sounds are normal. There is no distension.      Palpations: Abdomen is soft.      Tenderness: There is no abdominal tenderness. There is no guarding.   Musculoskeletal:         General:  Swelling (improved swelling to the right hand) and tenderness present.      Cervical back: Normal range of motion and neck supple.   Skin:     General: Skin is warm and dry.      Capillary Refill: Capillary refill takes 2 to 3 seconds.      Findings: Bruising and erythema (right hand) present.   Neurological:      Mental Status: She is oriented to person, place, and time.   Psychiatric:         Mood and Affect: Mood normal.         Behavior: Behavior normal.             Significant Labs: All pertinent labs within the past 24 hours have been reviewed.    Significant Imaging: I have reviewed all pertinent imaging results/findings within the past 24 hours.    Assessment/Plan:      * Cat scratch  Patient noted with a cat scratch to her right hand--patient states the cat is her cat and has been for the last 14 years.  -she was seen outpatient and started on Augmentin---patient has failed outpatient therapy  -blood cultures and wound cultures pending  -will cont Unasyn while inpatient and follow cultures  -1/29:  It was reported to the staff that on yesterday evening the wound was noted to be swollen more so than on admission, nursing stated they placed warm compress to the right hand which helped pustule draining.  We will continue IV Unasyn---leukocytosis improved and patient has been afebrile.  Will ask hand surgery to evaluate.  Follow blood cultures.      Heart failure with preserved ejection fraction  TTE--4/22/23:  The left ventricle is normal in size with concentric hypertrophy and normal systolic function.  The estimated ejection fraction is 55%.  Indeterminate left ventricular diastolic function.  With normal right ventricular systolic function.  Severe left atrial enlargement.  Severe right atrial enlargement.  Mild mitral regurgitation.  Mild to moderate tricuspid regurgitation.  There is pulmonary hypertension.  The estimated PA systolic pressure is 56 mmHg.  Elevated central venous pressure (15  mmHg).  stable      Overweight (BMI 25.0-29.9)  Body mass index is 28.58 kg/m². Morbid obesity complicates all aspects of disease management from diagnostic modalities to treatment. Weight loss encouraged and health benefits explained to patient.        Chronic a-fib  ASA 81 mg PO daily  Cardiac tele   Cont monitoring      Primary hypertension  Chronic, controlled. Latest blood pressure and vitals reviewed-     Temp:  [97.7 °F (36.5 °C)-99 °F (37.2 °C)]   Pulse:  [57-75]   Resp:  [17-20]   BP: (105-150)/(53-65)   SpO2:  [92 %-96 %] .   Home meds for hypertension were reviewed and noted below.   Hypertension Medications               furosemide (LASIX) 20 MG tablet Take 2 tablets in the a.m. for weights 155-159, take 2 tablets twice a day for weights 160 over, none for weights less than 155    spironolactone (ALDACTONE) 25 MG tablet Take 1 tablet (25 mg total) by mouth once daily.            While in the hospital, will manage blood pressure as follows; Continue home antihypertensive regimen    Will utilize p.r.n. blood pressure medication only if patient's blood pressure greater than 180/110 and she develops symptoms such as worsening chest pain or shortness of breath.      VTE Risk Mitigation (From admission, onward)           Ordered     enoxaparin injection 30 mg  Every 24 hours         01/29/24 1114     IP VTE HIGH RISK PATIENT  Once         01/27/24 1328     Place sequential compression device  Until discontinued         01/27/24 1328                    Discharge Planning   GILES:      Code Status: Full Code   Is the patient medically ready for discharge?:     Reason for patient still in hospital (select all that apply): Laboratory test, Treatment, Imaging, and Consult recommendations                     Pito Shaw NP  Department of Hospital Medicine   [facility]

## 2024-01-29 NOTE — CONSULTS
[facility] - Telemetry  Wound Care    Patient Name:  Jenniffer Jimenez   MRN:  [id number]  Date: 2024  Diagnosis: Cat scratch    History:     Past Medical History:   Diagnosis Date    Amblyopia of left eye 04/10/2013    Arthritis     Facet arthropathy, Lumbosacral    Atherosclerosis of aorta     Cataract     Central retinal vein occlusion of left eye     CKD (chronic kidney disease) stage 3, GFR 30-59 ml/min     Diabetes mellitus, type 2     Diabetic polyneuropathy 2022    Exotropia of both eyes 2013    recession RSR 5.0mm w/ adj; recession LR os 5.0 w/ adj; resect MR os  4.0mm    Hearing loss     History of resection of small bowel     Hypertensive retinopathy of both eyes     Hypoglycemia     Macular degeneration     OA (osteoarthritis) of shoulder     Right    Osteoporosis     Posterior vitreous detachment of both eyes     Psychiatric problem     Rhinitis     TIA (transient ischemic attack)        Social History     Socioeconomic History    Marital status:    Occupational History    Occupation: retired   Tobacco Use    Smoking status: Former     Current packs/day: 0.00     Types: Cigarettes     Quit date: 10/29/1982     Years since quittin.2     Passive exposure: Never    Smokeless tobacco: Never   Substance and Sexual Activity    Alcohol use: Not Currently     Alcohol/week: 2.0 standard drinks of alcohol     Types: 2 Shots of liquor per week     Comment: rare    Drug use: No    Sexual activity: Yes   Other Topics Concern    Are you pregnant or think you may be? No    Breast-feeding No     Social Determinants of Health     Financial Resource Strain: Low Risk  (2024)    Overall Financial Resource Strain (CARDIA)     Difficulty of Paying Living Expenses: Not hard at all   Food Insecurity: No Food Insecurity (2024)    Hunger Vital Sign     Worried About Running Out of Food in the Last Year: Never true     Ran Out of Food in the Last Year: Never true   Transportation Needs: No  Transportation Needs (1/29/2024)    PRAPARE - Transportation     Lack of Transportation (Medical): No     Lack of Transportation (Non-Medical): No   Physical Activity: Inactive (1/29/2024)    Exercise Vital Sign     Days of Exercise per Week: 0 days     Minutes of Exercise per Session: 0 min   Stress: Stress Concern Present (1/29/2024)    [place] [employer] - Occupational Stress Questionnaire     Feeling of Stress : To some extent   Social Connections: Unknown (1/29/2024)    Social Connection and Isolation Panel [NHANES]     Frequency of Communication with Friends and Family: More than three times a week     Frequency of Social Gatherings with Friends and Family: More than three times a week     Marital Status:    Housing Stability: Low Risk  (1/29/2024)    Housing Stability Vital Sign     Unable to Pay for Housing in the Last Year: No     Number of Places Lived in the Last Year: 1     Unstable Housing in the Last Year: No       Precautions:     Allergies as of 01/27/2024 - Reviewed 01/27/2024   Allergen Reaction Noted    Opioids - morphine analogues Other (See Comments) 08/15/2018    Tizanidine Other (See Comments) 04/06/2018    Tramadol Hallucinations 01/18/2013    Beta-blockers (beta-adrenergic blocking agts) Other (See Comments) 10/23/2013    Morphine  12/24/2021    Opioids-meperidine and related  01/04/2022    Ciprofloxacin Rash 12/28/2022       [facility] Assessment Details/Treatment     R hand/thumb- infected cat bite- partial thickness at this time- plans for I&D with Dr. Moran  tomorrow- applied mepilex border for today and Dr. Moran will manage wound.       01/29/2024

## 2024-01-29 NOTE — CONSULTS
CC:  Right hand and thumb infection related to cat bite        HPI:  Jenniffer Jimenez is a very pleasant 89 y.o. female sustained cat bite injury to her right hand about 5 days ago  She is started having pain redness and swelling several days ago were started on oral Augmentin symptoms fail to improve   She was admitted 2 days ago to [place] for IV antibiotics   Local I and D was attempted in the ER with some drainage of purulent material   She has improved a little bit since admission but still having pain redness and swelling of the right hand         PAST MEDICAL HISTORY:   Past Medical History:   Diagnosis Date    Amblyopia of left eye 04/10/2013    Arthritis     Facet arthropathy, Lumbosacral    Atherosclerosis of aorta     Cataract     Central retinal vein occlusion of left eye     CKD (chronic kidney disease) stage 3, GFR 30-59 ml/min     Diabetes mellitus, type 2     Diabetic polyneuropathy 2022    Exotropia of both eyes 2013    recession RSR 5.0mm w/ adj; recession LR os 5.0 w/ adj; resect MR os  4.0mm    Hearing loss     History of resection of small bowel     Hypertensive retinopathy of both eyes     Hypoglycemia     Macular degeneration     OA (osteoarthritis) of shoulder     Right    Osteoporosis     Posterior vitreous detachment of both eyes     Psychiatric problem     Rhinitis     TIA (transient ischemic attack)      PAST SURGICAL HISTORY:   Past Surgical History:   Procedure Laterality Date    APPENDECTOMY      CARDIAC CATHETERIZATION      CATARACT EXTRACTION W/  INTRAOCULAR LENS IMPLANT Bilateral      SECTION, CLASSIC      CLOSURE OF LEFT ATRIAL APPENDAGE USING DEVICE N/A 2020    Procedure: Left atrial appendage closure device;  Surgeon: Abundio Curtis MD;  Location: [facility] CATH LAB;  Service: Cardiology;  Laterality: N/A;    HYSTERECTOMY      INNER EAR SURGERY      JOINT REPLACEMENT      LEFT KNEE REPLACEMENT IN 2013 -    OOPHORECTOMY      SINUS SURGERY      STRABISMUS  SURGERY  13    RSR recession 5 mm, LLR recession 5 mm and LMR resection 4mm    STRABISMUS SURGERY  2014    recess LR OD 6mm    TONSILLECTOMY      watchman surgery N/A 2020     FAMILY HISTORY:   Family History   Problem Relation Age of Onset    Hypertension Mother     Hypertension Father     Liver disease Sister     Diabetes Sister     Heart disease Sister     Diabetes Brother     Breast cancer Maternal Aunt     No Known Problems Maternal Uncle     No Known Problems Paternal Aunt     No Known Problems Paternal Uncle     No Known Problems Maternal Grandmother     No Known Problems Maternal Grandfather     No Known Problems Paternal Grandmother     No Known Problems Paternal Grandfather     No Known Problems Daughter     No Known Problems Son     Heart disease Sister     No Known Problems Son     No Known Problems Son     Cancer Neg Hx         in first degree relatives    Melanoma Neg Hx     Psoriasis Neg Hx     Lupus Neg Hx     Amblyopia Neg Hx     Blindness Neg Hx     Cataracts Neg Hx     Glaucoma Neg Hx     Macular degeneration Neg Hx     Retinal detachment Neg Hx     Strabismus Neg Hx     Stroke Neg Hx     Thyroid disease Neg Hx      SOCIAL HISTORY:   Social History     Socioeconomic History    Marital status:    Occupational History    Occupation: retired   Tobacco Use    Smoking status: Former     Current packs/day: 0.00     Types: Cigarettes     Quit date: 10/29/1982     Years since quittin.2     Passive exposure: Never    Smokeless tobacco: Never   Substance and Sexual Activity    Alcohol use: Not Currently     Alcohol/week: 2.0 standard drinks of alcohol     Types: 2 Shots of liquor per week     Comment: rare    Drug use: No    Sexual activity: Yes   Other Topics Concern    Are you pregnant or think you may be? No    Breast-feeding No     Social Determinants of Health     Financial Resource Strain: Low Risk  (2023)    Overall Financial Resource Strain (CARDIA)     Difficulty of  Paying Living Expenses: Not very hard   Food Insecurity: No Food Insecurity (4/27/2023)    Hunger Vital Sign     Worried About Running Out of Food in the Last Year: Never true     Ran Out of Food in the Last Year: Never true   Transportation Needs: No Transportation Needs (4/27/2023)    PRAPARE - Transportation     Lack of Transportation (Medical): No     Lack of Transportation (Non-Medical): No   Physical Activity: Inactive (4/27/2023)    Exercise Vital Sign     Days of Exercise per Week: 0 days     Minutes of Exercise per Session: 0 min   Stress: No Stress Concern Present (4/27/2023)    Citizen of [place] [employer] Occupational [employer] - Occupational Stress Questionnaire     Feeling of Stress : Only a little   Social Connections: Unknown (4/27/2023)    Social Connection and Isolation Panel [NHANES]     Frequency of Communication with Friends and Family: Twice a week     Frequency of Social Gatherings with Friends and Family: Once a week     Marital Status:    Recent Concern: Social Connections - Moderately Isolated (4/27/2023)    Social Connection and Isolation Panel [NHANES]     Frequency of Communication with Friends and Family: Twice a week     Frequency of Social Gatherings with Friends and Family: Once a week     Attends Jewish Services: Never     Active Member of Clubs or Organizations: Yes     Attends Club or Organization Meetings: Never     Marital Status:    Housing Stability: Low Risk  (4/27/2023)    Housing Stability Vital Sign     Unable to Pay for Housing in the Last Year: No     Number of Places Lived in the Last Year: 1     Unstable Housing in the Last Year: No       MEDICATIONS:   Current Facility-Administered Medications:     acetaminophen tablet 650 mg, 650 mg, Oral, Q4H PRN, Pito Shaw NP, 650 mg at 01/28/24 1422    albuterol-ipratropium 2.5 mg-0.5 mg/3 mL nebulizer solution 3 mL, 3 mL, Nebulization, Q4H PRN, Pito Shaw NP    aluminum-magnesium  "hydroxide-simethicone 200-200-20 mg/5 mL suspension 30 mL, 30 mL, Oral, QID PRN, Pito Shaw, SEAN    ampicillin-sulbactam (UNASYN) 3 g in sodium chloride 0.9 % 100 mL IVPB (MB+), 3 g, Intravenous, Q12H, Pito Shaw, NP, Stopped at 01/29/24 0258    aspirin EC tablet 81 mg, 81 mg, Oral, Daily, Pito Shaw, NP    enoxaparin injection 30 mg, 30 mg, Subcutaneous, Q24H (prophylaxis, 1700), Pito Shaw, SEAN    ferrous sulfate tablet 1 each, 1 tablet, Oral, Daily, Pito Shaw, SEAN    melatonin tablet 6 mg, 6 mg, Oral, Nightly PRN, Pito Shaw, NP, 6 mg at 01/28/24 2141    ondansetron disintegrating tablet 8 mg, 8 mg, Oral, Q8H PRN, Pito Shaw, SEAN    ondansetron injection 4 mg, 4 mg, Intravenous, Q8H PRN, Pito Shaw, SEAN    pravastatin tablet 40 mg, 40 mg, Oral, Daily, Pito Shaw, SEAN    psyllium capsule 1.56 g, 3 capsule, Oral, TID PRN, Pito Shaw, NP, 1.56 g at 01/29/24 1202    senna-docusate 8.6-50 mg per tablet 1 tablet, 1 tablet, Oral, BID, Pito Shaw, NP, 1 tablet at 01/29/24 0856    simethicone chewable tablet 80 mg, 1 tablet, Oral, QID PRN, Pito Shaw, SEAN    sodium chloride 0.9% flush 10 mL, 10 mL, Intravenous, Q8H, Pito Shaw, NP, 10 mL at 01/29/24 0621    spironolactone tablet 25 mg, 25 mg, Oral, Daily, Pito Shaw, NP    vitamin E capsule 400 Units, 400 Units, Oral, Daily, Pito Shaw, SEAN  ALLERGIES:   Review of patient's allergies indicates:   Allergen Reactions    Opioids - morphine analogues Other (See Comments)     Bowel issues; bowel obstruction    Tizanidine Other (See Comments)     "Lips were numb,  Almost passed out."    Tramadol Hallucinations    Beta-blockers (beta-adrenergic blocking agts) Other (See Comments)     Can not go on beta blockers for long period of time " - due to taking allergy injections    Morphine     Opioids-meperidine and related     Ciprofloxacin Rash       Review of Systems:  Constitutional: no fever or chills  ENT: no nasal congestion or sore throat  Respiratory: no cough or shortness of breath  Cardiovascular: no chest pain or palpitations  Gastrointestinal: no nausea or vomiting, PUD, GERD, NSAID intolerance  Genitourinary: no hematuria or dysuria  Integument/Breast: no rash or pruritis  Hematologic/Lymphatic: no easy bruising or lymphadenopathy  Musculoskeletal: see HPI  Neurological: no seizures or tremors  Behavioral/Psych: no auditory or visual hallucinations      Physical Exam   Vitals:    01/29/24 0600 01/29/24 0743 01/29/24 1124 01/29/24 1206   BP:  (!) 119/57 134/60    Pulse:  70 69 74   Resp:  18 18    Temp:  98.6 °F (37 °C) 99 °F (37.2 °C)    TempSrc:  Oral Oral    SpO2:  (!) 92% (!) 94%    Weight: 77.9 kg (171 lb 11.8 oz)      Height:           Constitutional: Oriented to person, place, and time. Appears well-developed and well-nourished.   HENT:   Head: Normocephalic and atraumatic.   Nose: Nose normal.   Eyes: No scleral icterus.   Neck: Normal range of motion.   Cardiovascular: Normal rate and regular rhythm.    Pulses:       Radial pulses are 2+ on the right side, and 2+ on the left side.   Pulmonary/Chest: Effort normal and breath sounds normal.   Abdominal: Soft.   Neurological: Alert and oriented to person, place, and time.   Skin: Skin is warm.   Psychiatric: Normal mood and affect.     MUSCULOSKELETAL UPPER EXTREMITY:  Examination right hand there is a puncture wound on the dorsum of the right thumb near the base swelling is noted with surrounding redness extending volarly mild fluctuance   Diffuse tenderness   No involvement of the fingers or wrist per se               Diagnostic Studies:  None        Assessment:  Abscess right hand and thumb dorsal related to cat bite    Plan:  Symptoms have not resolved after 2 days of IV antibiotics  "so I have recommended surgical treatment   We will plan surgical I and D right hand for tomorrow   The patient understands and has signed the consent form for surgery      The risks and benefits of surgery were discussed with the patient today and they understand.  The consent was signed in the office for surgery.      Con Moran MD (Jay)  [facility]  Orthopedic Upper Extremity Surgery      "
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
## 2024-01-29 NOTE — ASSESSMENT & PLAN NOTE
Chronic, controlled. Latest blood pressure and vitals reviewed-     Temp:  [97.7 °F (36.5 °C)-99 °F (37.2 °C)]   Pulse:  [57-75]   Resp:  [17-20]   BP: (105-150)/(53-65)   SpO2:  [92 %-96 %] .   Home meds for hypertension were reviewed and noted below.   Hypertension Medications               furosemide (LASIX) 20 MG tablet Take 2 tablets in the a.m. for weights 155-159, take 2 tablets twice a day for weights 160 over, none for weights less than 155    spironolactone (ALDACTONE) 25 MG tablet Take 1 tablet (25 mg total) by mouth once daily.            While in the hospital, will manage blood pressure as follows; Continue home antihypertensive regimen    Will utilize p.r.n. blood pressure medication only if patient's blood pressure greater than 180/110 and she develops symptoms such as worsening chest pain or shortness of breath.

## 2024-01-29 NOTE — SUBJECTIVE & OBJECTIVE
Interval History: seen at the bedside this am. No distress noted. Right hand wound noted with slight improvement from admission. Will cont IV unasyn and following wound and blood cultures--BC with ngtd from 1/27.  Urine cx with no growth    Review of Systems   Constitutional:  Negative for fatigue and fever.   HENT:  Negative for trouble swallowing.    Respiratory:  Negative for cough and shortness of breath.    Cardiovascular:  Negative for chest pain.   Gastrointestinal:  Negative for diarrhea, nausea and vomiting.   Genitourinary:  Positive for frequency.   Musculoskeletal:  Negative for gait problem.   Skin:  Positive for wound (right hand).   Hematological:  Bruises/bleeds easily.   Psychiatric/Behavioral:  Negative for confusion.      Objective:     Vital Signs (Most Recent):  Temp: 98.6 °F (37 °C) (01/29/24 0743)  Pulse: 70 (01/29/24 0743)  Resp: 18 (01/29/24 0743)  BP: (!) 119/57 (01/29/24 0743)  SpO2: (!) 92 % (01/29/24 0743) Vital Signs (24h Range):  Temp:  [97.7 °F (36.5 °C)-98.6 °F (37 °C)] 98.6 °F (37 °C)  Pulse:  [57-75] 70  Resp:  [17-20] 18  SpO2:  [92 %-96 %] 92 %  BP: (105-150)/(53-65) 119/57     Weight: 77.9 kg (171 lb 11.8 oz)  Body mass index is 28.58 kg/m².    Intake/Output Summary (Last 24 hours) at 1/29/2024 1111  Last data filed at 1/29/2024 0621  Gross per 24 hour   Intake 530 ml   Output 1550 ml   Net -1020 ml           Physical Exam  Vitals and nursing note reviewed.   Constitutional:       Appearance: Normal appearance.   HENT:      Head: Normocephalic and atraumatic.      Mouth/Throat:      Mouth: Mucous membranes are moist.   Eyes:      Extraocular Movements: Extraocular movements intact.   Cardiovascular:      Rate and Rhythm: Normal rate and regular rhythm.      Pulses: Normal pulses.      Heart sounds: Normal heart sounds.   Pulmonary:      Effort: No respiratory distress.      Breath sounds: Normal breath sounds. No wheezing.   Abdominal:      General: Bowel sounds are normal.  There is no distension.      Palpations: Abdomen is soft.      Tenderness: There is no abdominal tenderness. There is no guarding.   Musculoskeletal:         General: Swelling (improved swelling to the right hand) and tenderness present.      Cervical back: Normal range of motion and neck supple.   Skin:     General: Skin is warm and dry.      Capillary Refill: Capillary refill takes 2 to 3 seconds.      Findings: Bruising and erythema (right hand) present.   Neurological:      Mental Status: She is oriented to person, place, and time.   Psychiatric:         Mood and Affect: Mood normal.         Behavior: Behavior normal.             Significant Labs: All pertinent labs within the past 24 hours have been reviewed.    Significant Imaging: I have reviewed all pertinent imaging results/findings within the past 24 hours.
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
## 2024-01-29 NOTE — ASSESSMENT & PLAN NOTE
Patient noted with a cat scratch to her right hand--patient states the cat is her cat and has been for the last 14 years.  -she was seen outpatient and started on Augmentin---patient has failed outpatient therapy  -blood cultures and wound cultures pending  -will cont Unasyn while inpatient and follow cultures  -1/29:  It was reported to the staff that on yesterday evening the wound was noted to be swollen more so than on admission, nursing stated they placed warm compress to the right hand which helped pustule draining.  We will continue IV Unasyn---leukocytosis improved and patient has been afebrile.  Will ask hand surgery to evaluate.  Follow blood cultures.

## 2024-01-29 NOTE — PROGRESS NOTES
"[facility]           Pharmacy  Admission Medication History     The home medication history was taken by Afshin Mcneil PharmD.      Medication history obtained from Medications listed below were obtained from: Patient/family and Analytic software- Identified    Based on information gathered for medication list, you may go to "Admission" then "Reconcile Home Medications" tabs to review and/or act upon those items.     The home medication list has been updated by the Pharmacy department.   Please read ALL comments highlighted in yellow.   Please address this information as you see fit.    Feel free to contact us if you have any questions or require assistance.    The medications listed below were removed from the home medication list.  Please reorder if appropriate:    Addressed with provider   Potential issues to be addressed PRIOR TO DISCHARGE      No current facility-administered medications on file prior to encounter.     Current Outpatient Medications on File Prior to Encounter   Medication Sig Dispense Refill    acetaminophen (TYLENOL) 500 MG tablet Take 1,000 mg by mouth 2 (two) times a day.      albuterol-ipratropium (DUO-NEB) 2.5 mg-0.5 mg/3 mL nebulizer solution Take 3 mLs by nebulization every 6 (six) hours as needed for Wheezing or Shortness of Breath. Rescue 75 mL 0    amoxicillin-clavulanate 875-125mg (AUGMENTIN) 875-125 mg per tablet Take 1 tablet by mouth 2 (two) times daily. 5 tablet 0    aspirin (ECOTRIN) 81 MG EC tablet Take 81 mg by mouth once daily.      clotrimazole-betamethasone 1-0.05% (LOTRISONE) cream Apply topically 2 (two) times daily. 45 g 1    diclofenac sodium (VOLTAREN) 1 % Gel Apply 2 g topically 4 (four) times daily. Apply to right hip      ferrous sulfate 325 (65 FE) MG EC tablet Take 1 tablet (325 mg total) by mouth once daily.      furosemide (LASIX) 20 MG tablet Take 2 tablets in the a.m. for weights 155-159, take 2 tablets twice a day for weights 160 over, none " for weights less than 155 120 tablet 11    miconazole (MICOTIN) 2 % cream Per instructions 2 TIMES DAILY (route: topical)      miconazole nitrate 2% (MICOTIN) 2 % Oint Apply topically 2 (two) times daily. Coloplast Clear Antifungal ointment- (green top)- nursing to apply to perineum, inner thighs, pannus, and buttocks  g 0    pravastatin (PRAVACHOL) 40 MG tablet Take 1 tablet (40 mg total) by mouth once daily. 90 tablet 3    propylene glycol (SYSTANE COMPLETE OPHT) Apply to eye.      silver sulfADIAZINE 1% (SILVADENE) 1 % cream Apply to RLE skin breakdown twice daily (Patient taking differently: Apply topically 2 (two) times daily. Apply to RLE skin breakdown twice daily)      spironolactone (ALDACTONE) 25 MG tablet Take 1 tablet (25 mg total) by mouth once daily. 90 tablet 3    triamcinolone acetonide 0.1% (KENALOG) 0.1 % ointment Apply topically 2 (two) times daily. Use to affected areas for up to 2 weeks then take a 1 week break or decrease to 3 times weekly. Do not apply to groin or face. Apply to red scaly areas on the legs and arms 454 g 1    vitamin E 400 UNIT capsule Take 400 Units by mouth once daily.      COMIRNATY 2023-24, 12Y UP,,PF, 30 mcg/0.3 mL inection       FLUAD QUAD 2023-24,65Y UP,,PF, 60 mcg (15 mcg x 4)/0.5 mL Syrg       flucytosine (ANCOBON) 500 mg Cap Take by mouth.      [DISCONTINUED] fluticasone propionate (FLONASE) 50 mcg/actuation nasal spray 2 sprays (100 mcg total) by Each Nostril route once daily. (Patient not taking: Reported on 1/27/2024)  0       Please address this information as you see fit.  Feel free to contact us if you have any questions or require assistance.    Afshin Mcneil, PharmD  [phone]

## 2024-01-29 NOTE — ASSESSMENT & PLAN NOTE
Patient noted with a cat scratch to her left hand--patient states the cat is her cat and has been for the last 14 years.  -she was seen outpatient and started on Augmentin---patient has failed outpatient therapy  -blood cultures and wound cultures pending  -will cont Unasyn while inpatient and follow cultures  -1/29:  It was reported to the staff that on yesterday evening the wound was noted to be swollen more so than on admission, nursing stated they placed warm compress to the right hand which helped pustule draining.  We will continue IV Unasyn---leukocytosis improved and patient has been afebrile.  Will ask hand surgery to evaluate.  Follow blood cultures.

## 2024-01-30 ENCOUNTER — ANESTHESIA (OUTPATIENT)
Dept: SURGERY | Facility: HOSPITAL | Age: 89
DRG: 580 | End: 2024-01-30
Payer: MEDICARE

## 2024-01-30 ENCOUNTER — ANESTHESIA EVENT (OUTPATIENT)
Dept: SURGERY | Facility: HOSPITAL | Age: 89
DRG: 580 | End: 2024-01-30
Payer: MEDICARE

## 2024-01-30 PROBLEM — S61.451A INFECTED CAT BITE OF HAND, RIGHT, INITIAL ENCOUNTER: Status: ACTIVE | Noted: 2024-01-30
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
## 2024-01-30 PROBLEM — L08.9 INFECTED CAT BITE OF HAND, RIGHT, INITIAL ENCOUNTER: Status: ACTIVE | Noted: 2024-01-30

## 2024-01-30 LAB
ANION GAP SERPL CALC-SCNC: 10 MMOL/L (ref 8–16)
BASOPHILS # BLD AUTO: 0.03 K/UL (ref 0–0.2)
BASOPHILS NFR BLD: 0.7 % (ref 0–1.9)
BUN SERPL-MCNC: 31 MG/DL (ref 10–30)
CALCIUM SERPL-MCNC: 9 MG/DL (ref 8.7–10.5)
CHLORIDE SERPL-SCNC: 105 MMOL/L (ref 95–110)
CO2 SERPL-SCNC: 22 MMOL/L (ref 23–29)
CREAT SERPL-MCNC: 1.2 MG/DL (ref 0.5–1.4)
DIFFERENTIAL METHOD BLD: ABNORMAL
EOSINOPHIL # BLD AUTO: 0.1 K/UL (ref 0–0.5)
EOSINOPHIL NFR BLD: 1.8 % (ref 0–8)
ERYTHROCYTE [DISTWIDTH] IN BLOOD BY AUTOMATED COUNT: 15.9 % (ref 11.5–14.5)
EST. GFR  (NO RACE VARIABLE): 43 ML/MIN/1.73 M^2
GLUCOSE SERPL-MCNC: 125 MG/DL (ref 70–110)
HCT VFR BLD AUTO: 28.5 % (ref 37–48.5)
HGB BLD-MCNC: 9.8 G/DL (ref 12–16)
IMM GRANULOCYTES # BLD AUTO: 0.05 K/UL (ref 0–0.04)
IMM GRANULOCYTES NFR BLD AUTO: 1.1 % (ref 0–0.5)
LYMPHOCYTES # BLD AUTO: 0.7 K/UL (ref 1–4.8)
LYMPHOCYTES NFR BLD: 16.3 % (ref 18–48)
MAGNESIUM SERPL-MCNC: 1.9 MG/DL (ref 1.6–2.6)
MCH RBC QN AUTO: 29.7 PG (ref 27–31)
MCHC RBC AUTO-ENTMCNC: 34.4 G/DL (ref 32–36)
MCV RBC AUTO: 86 FL (ref 82–98)
MONOCYTES # BLD AUTO: 0.5 K/UL (ref 0.3–1)
MONOCYTES NFR BLD: 10.6 % (ref 4–15)
NEUTROPHILS # BLD AUTO: 3.2 K/UL (ref 1.8–7.7)
NEUTROPHILS NFR BLD: 69.5 % (ref 38–73)
NRBC BLD-RTO: 0 /100 WBC
PLATELET # BLD AUTO: 145 K/UL (ref 150–450)
PMV BLD AUTO: 10.8 FL (ref 9.2–12.9)
POTASSIUM SERPL-SCNC: 4.3 MMOL/L (ref 3.5–5.1)
RBC # BLD AUTO: 3.3 M/UL (ref 4–5.4)
SODIUM SERPL-SCNC: 137 MMOL/L (ref 136–145)
WBC # BLD AUTO: 4.54 K/UL (ref 3.9–12.7)

## 2024-01-30 PROCEDURE — 11000001 HC ACUTE MED/SURG PRIVATE ROOM: Mod: HCNC

## 2024-01-30 PROCEDURE — 37000009 HC ANESTHESIA EA ADD 15 MINS: Mod: HCNC | Performed by: ORTHOPAEDIC SURGERY

## 2024-01-30 PROCEDURE — 83735 ASSAY OF MAGNESIUM: CPT | Mod: HCNC | Performed by: NURSE PRACTITIONER

## 2024-01-30 PROCEDURE — 87102 FUNGUS ISOLATION CULTURE: CPT | Mod: HCNC | Performed by: ORTHOPAEDIC SURGERY

## 2024-01-30 PROCEDURE — 25000003 PHARM REV CODE 250: Mod: HCNC | Performed by: ORTHOPAEDIC SURGERY

## 2024-01-30 PROCEDURE — 87116 MYCOBACTERIA CULTURE: CPT | Mod: HCNC | Performed by: ORTHOPAEDIC SURGERY

## 2024-01-30 PROCEDURE — 25000003 PHARM REV CODE 250: Mod: HCNC | Performed by: NURSE PRACTITIONER

## 2024-01-30 PROCEDURE — 36000707: Mod: HCNC | Performed by: ORTHOPAEDIC SURGERY

## 2024-01-30 PROCEDURE — 87076 CULTURE ANAEROBE IDENT EACH: CPT | Mod: HCNC | Performed by: ORTHOPAEDIC SURGERY

## 2024-01-30 PROCEDURE — 87206 SMEAR FLUORESCENT/ACID STAI: CPT | Mod: HCNC | Performed by: ORTHOPAEDIC SURGERY

## 2024-01-30 PROCEDURE — 63700000 PHARM REV CODE 250 ALT 637 W/O HCPCS: Mod: HCNC | Performed by: NURSE PRACTITIONER

## 2024-01-30 PROCEDURE — 36000706: Mod: HCNC | Performed by: ORTHOPAEDIC SURGERY

## 2024-01-30 PROCEDURE — 36415 COLL VENOUS BLD VENIPUNCTURE: CPT | Mod: HCNC | Performed by: NURSE PRACTITIONER

## 2024-01-30 PROCEDURE — 71000039 HC RECOVERY, EACH ADD'L HOUR: Mod: HCNC | Performed by: ORTHOPAEDIC SURGERY

## 2024-01-30 PROCEDURE — 87077 CULTURE AEROBIC IDENTIFY: CPT | Mod: HCNC | Performed by: ORTHOPAEDIC SURGERY

## 2024-01-30 PROCEDURE — 63600175 PHARM REV CODE 636 W HCPCS: Mod: HCNC | Performed by: ORTHOPAEDIC SURGERY

## 2024-01-30 PROCEDURE — 0JBJ0ZZ EXCISION OF RIGHT HAND SUBCUTANEOUS TISSUE AND FASCIA, OPEN APPROACH: ICD-10-PCS | Performed by: ORTHOPAEDIC SURGERY

## 2024-01-30 PROCEDURE — 87070 CULTURE OTHR SPECIMN AEROBIC: CPT | Mod: HCNC | Performed by: ORTHOPAEDIC SURGERY

## 2024-01-30 PROCEDURE — D9220A PRA ANESTHESIA: Mod: ANES,,, | Performed by: STUDENT IN AN ORGANIZED HEALTH CARE EDUCATION/TRAINING PROGRAM

## 2024-01-30 PROCEDURE — 25000003 PHARM REV CODE 250: Mod: HCNC | Performed by: NURSE ANESTHETIST, CERTIFIED REGISTERED

## 2024-01-30 PROCEDURE — 10061 I&D ABSCESS COMP/MULTIPLE: CPT | Mod: HCNC,,, | Performed by: ORTHOPAEDIC SURGERY

## 2024-01-30 PROCEDURE — D9220A PRA ANESTHESIA: Mod: CRNA,,, | Performed by: NURSE ANESTHETIST, CERTIFIED REGISTERED

## 2024-01-30 PROCEDURE — 85025 COMPLETE CBC W/AUTO DIFF WBC: CPT | Mod: HCNC | Performed by: NURSE PRACTITIONER

## 2024-01-30 PROCEDURE — 87205 SMEAR GRAM STAIN: CPT | Mod: HCNC | Performed by: ORTHOPAEDIC SURGERY

## 2024-01-30 PROCEDURE — 87075 CULTR BACTERIA EXCEPT BLOOD: CPT | Mod: HCNC | Performed by: ORTHOPAEDIC SURGERY

## 2024-01-30 PROCEDURE — 94761 N-INVAS EAR/PLS OXIMETRY MLT: CPT | Mod: HCNC

## 2024-01-30 PROCEDURE — 80048 BASIC METABOLIC PNL TOTAL CA: CPT | Mod: HCNC | Performed by: NURSE PRACTITIONER

## 2024-01-30 PROCEDURE — 87015 SPECIMEN INFECT AGNT CONCNTJ: CPT | Mod: HCNC | Performed by: ORTHOPAEDIC SURGERY

## 2024-01-30 PROCEDURE — 37000008 HC ANESTHESIA 1ST 15 MINUTES: Mod: HCNC | Performed by: ORTHOPAEDIC SURGERY

## 2024-01-30 PROCEDURE — 63600175 PHARM REV CODE 636 W HCPCS: Mod: HCNC | Performed by: NURSE ANESTHETIST, CERTIFIED REGISTERED

## 2024-01-30 PROCEDURE — 63600175 PHARM REV CODE 636 W HCPCS: Mod: HCNC | Performed by: NURSE PRACTITIONER

## 2024-01-30 PROCEDURE — 71000033 HC RECOVERY, INTIAL HOUR: Mod: HCNC | Performed by: ORTHOPAEDIC SURGERY

## 2024-01-30 RX ORDER — HYDROMORPHONE HYDROCHLORIDE 2 MG/ML
0.2 INJECTION, SOLUTION INTRAMUSCULAR; INTRAVENOUS; SUBCUTANEOUS EVERY 5 MIN PRN
Status: DISCONTINUED | OUTPATIENT
Start: 2024-01-30 | End: 2024-02-04 | Stop reason: HOSPADM

## 2024-01-30 RX ORDER — PROPOFOL 10 MG/ML
VIAL (ML) INTRAVENOUS
Status: DISCONTINUED | OUTPATIENT
Start: 2024-01-30 | End: 2024-01-30

## 2024-01-30 RX ORDER — OXYCODONE AND ACETAMINOPHEN 5; 325 MG/1; MG/1
1 TABLET ORAL EVERY 4 HOURS PRN
Status: DISCONTINUED | OUTPATIENT
Start: 2024-01-30 | End: 2024-02-04 | Stop reason: HOSPADM

## 2024-01-30 RX ORDER — KETOROLAC TROMETHAMINE 30 MG/ML
15 INJECTION, SOLUTION INTRAMUSCULAR; INTRAVENOUS ONCE
Status: DISPENSED | OUTPATIENT
Start: 2024-01-30 | End: 2024-02-02

## 2024-01-30 RX ORDER — ACETAMINOPHEN 10 MG/ML
INJECTION, SOLUTION INTRAVENOUS
Status: DISCONTINUED | OUTPATIENT
Start: 2024-01-30 | End: 2024-01-30

## 2024-01-30 RX ORDER — LIDOCAINE HYDROCHLORIDE 10 MG/ML
INJECTION, SOLUTION EPIDURAL; INFILTRATION; INTRACAUDAL; PERINEURAL
Status: DISCONTINUED | OUTPATIENT
Start: 2024-01-30 | End: 2024-01-30 | Stop reason: HOSPADM

## 2024-01-30 RX ORDER — PROPOFOL 10 MG/ML
VIAL (ML) INTRAVENOUS CONTINUOUS PRN
Status: DISCONTINUED | OUTPATIENT
Start: 2024-01-30 | End: 2024-01-30

## 2024-01-30 RX ORDER — VANCOMYCIN HYDROCHLORIDE 1 G/20ML
INJECTION, POWDER, LYOPHILIZED, FOR SOLUTION INTRAVENOUS
Status: DISCONTINUED | OUTPATIENT
Start: 2024-01-30 | End: 2024-01-30 | Stop reason: HOSPADM

## 2024-01-30 RX ORDER — ONDANSETRON HYDROCHLORIDE 2 MG/ML
4 INJECTION, SOLUTION INTRAVENOUS DAILY PRN
Status: DISCONTINUED | OUTPATIENT
Start: 2024-01-30 | End: 2024-02-04 | Stop reason: HOSPADM

## 2024-01-30 RX ORDER — OXYCODONE HYDROCHLORIDE 5 MG/1
5 TABLET ORAL
Status: DISCONTINUED | OUTPATIENT
Start: 2024-01-30 | End: 2024-02-04 | Stop reason: HOSPADM

## 2024-01-30 RX ORDER — BUPIVACAINE HYDROCHLORIDE 5 MG/ML
INJECTION, SOLUTION EPIDURAL; INTRACAUDAL
Status: DISCONTINUED | OUTPATIENT
Start: 2024-01-30 | End: 2024-01-30 | Stop reason: HOSPADM

## 2024-01-30 RX ADMIN — DOCUSATE SODIUM AND SENNOSIDES 1 TABLET: 8.6; 5 TABLET, FILM COATED ORAL at 09:01

## 2024-01-30 RX ADMIN — AMPICILLIN SODIUM AND SULBACTAM SODIUM 3 G: 2; 1 INJECTION, POWDER, FOR SOLUTION INTRAMUSCULAR; INTRAVENOUS at 02:01

## 2024-01-30 RX ADMIN — SIMETHICONE ANTIGAS 1.56 G: 125 TABLET, CHEWABLE ORAL at 05:01

## 2024-01-30 RX ADMIN — PROPOFOL 50 MG: 10 INJECTION, EMULSION INTRAVENOUS at 06:01

## 2024-01-30 RX ADMIN — ACETAMINOPHEN 1000 MG: 10 INJECTION, SOLUTION INTRAVENOUS at 06:01

## 2024-01-30 RX ADMIN — ASPIRIN 81 MG: 81 TABLET, COATED ORAL at 10:01

## 2024-01-30 RX ADMIN — FERROUS SULFATE TAB 325 MG (65 MG ELEMENTAL FE) 1 EACH: 325 (65 FE) TAB at 10:01

## 2024-01-30 RX ADMIN — DOCUSATE SODIUM AND SENNOSIDES 1 TABLET: 8.6; 5 TABLET, FILM COATED ORAL at 10:01

## 2024-01-30 RX ADMIN — PRAVASTATIN SODIUM 40 MG: 40 TABLET ORAL at 09:01

## 2024-01-30 RX ADMIN — Medication 400 UNITS: at 10:01

## 2024-01-30 RX ADMIN — SPIRONOLACTONE 25 MG: 25 TABLET, FILM COATED ORAL at 10:01

## 2024-01-30 RX ADMIN — PROPOFOL 100 MCG/KG/MIN: 10 INJECTION, EMULSION INTRAVENOUS at 06:01

## 2024-01-30 RX ADMIN — SODIUM CHLORIDE: 0.9 INJECTION, SOLUTION INTRAVENOUS at 05:01

## 2024-01-30 NOTE — SUBJECTIVE & OBJECTIVE
Interval History: I & D of cat bite plan for today, continue antibiotic.     Review of Systems   Constitutional:  Negative for fatigue and fever.   HENT:  Negative for trouble swallowing.    Respiratory:  Negative for cough and shortness of breath.    Cardiovascular:  Negative for chest pain.   Gastrointestinal:  Negative for diarrhea, nausea and vomiting.   Genitourinary:  Negative for frequency.   Musculoskeletal:  Negative for gait problem.   Skin:  Positive for wound (right hand).   Hematological:  Bruises/bleeds easily.   Psychiatric/Behavioral:  Negative for confusion.      Objective:     Vital Signs (Most Recent):  Temp: 97.9 °F (36.6 °C) (01/30/24 1138)  Pulse: 71 (01/30/24 1138)  Resp: 18 (01/30/24 1138)  BP: (!) 109/54 (01/30/24 1138)  SpO2: 96 % (01/30/24 1138) Vital Signs (24h Range):  Temp:  [97.5 °F (36.4 °C)-98.4 °F (36.9 °C)] 97.9 °F (36.6 °C)  Pulse:  [68-81] 71  Resp:  [17-18] 18  SpO2:  [95 %-97 %] 96 %  BP: (109-159)/(53-70) 109/54     Weight: 78.7 kg (173 lb 8 oz)  Body mass index is 28.87 kg/m².    Intake/Output Summary (Last 24 hours) at 1/30/2024 1212  Last data filed at 1/30/2024 0342  Gross per 24 hour   Intake 200 ml   Output 1300 ml   Net -1100 ml         Physical Exam  Vitals and nursing note reviewed.   Constitutional:       Appearance: Normal appearance.   HENT:      Head: Normocephalic and atraumatic.      Mouth/Throat:      Mouth: Mucous membranes are moist.   Eyes:      Extraocular Movements: Extraocular movements intact.   Cardiovascular:      Rate and Rhythm: Normal rate and regular rhythm.      Pulses: Normal pulses.      Heart sounds: Normal heart sounds.   Pulmonary:      Effort: No respiratory distress.      Breath sounds: Normal breath sounds. No wheezing.   Abdominal:      General: Bowel sounds are normal. There is no distension.      Palpations: Abdomen is soft.      Tenderness: There is no abdominal tenderness. There is no guarding.   Musculoskeletal:         General:  Swelling (improved swelling to the right hand) and tenderness (mild) present.      Cervical back: Normal range of motion and neck supple.   Skin:     General: Skin is warm and dry.      Capillary Refill: Capillary refill takes 2 to 3 seconds.      Findings: Bruising and erythema (right hand) present.   Neurological:      Mental Status: She is oriented to person, place, and time.   Psychiatric:         Mood and Affect: Mood normal.         Behavior: Behavior normal.             Significant Labs: All pertinent labs within the past 24 hours have been reviewed.  BMP:   Recent Labs   Lab 01/30/24  0302   *      K 4.3      CO2 22*   BUN 31*   CREATININE 1.2   CALCIUM 9.0   MG 1.9     CBC:   Recent Labs   Lab 01/29/24  0324 01/30/24  0302   WBC 4.21 4.54   HGB 9.6* 9.8*   HCT 28.0* 28.5*   * 145*       Significant Imaging:     Imaging Results              X-Ray Hand 3 view Right (Final result)  Result time 01/27/24 12:35:56      Final result by Luis Goodwin MD (01/27/24 12:35:56)                   Impression:      No acute findings.      Electronically signed by: Luis Goodwin MD  Date:    01/27/2024  Time:    12:35               Narrative:    EXAMINATION:  XR HAND COMPLETE 3 VIEW RIGHT    CLINICAL HISTORY:  right hand swelling;    TECHNIQUE:  PA, lateral, and oblique views of the right hand were performed.    COMPARISON:  None    FINDINGS:  Scattered degenerative changes, most prominent at the 1st CMC and triscaphe joint, noting marked joint space loss with osteophytosis.  Osteopenia noted.  No fracture identified.  Alignment within normal limits.  Prominent scattered calcific atherosclerosis noted.

## 2024-01-30 NOTE — PROGRESS NOTES
[facility] Medicine  Progress Note    Patient Name: Jenniffer Jimenez  MRN: [id number]  Patient Class: IP- Inpatient   Admission Date: 1/27/2024  Length of Stay: 3 days  Attending Physician: José Miguel Beverly MD  Primary Care Provider: Reed Ruiz MD        Subjective:     Principal Problem:Cat scratch        HPI:  Jenniffer Jimenez is a 89-year-old female with a past medical history of CKD, diabetes, TIA osteoarthritis presents with cat bite.    Patient states that about 4 or 5 days ago, patient was bit by her cat on her right hand.  She was seen by an outside provider and prescribed Augmentin which she started last night.  She has taken 2 doses so far but she has been complaining of worsening painful abscess to the palm site of her hand, which has significant drainage and swelling.  She presents for further care.    Triage vitals exam significant for tachypnea.  Review of systems is significant for tender hand with drainage.  CBC significant for normocytic anemia, thrombocytopenia.  CMP is significant for slightly increased sugar.    UA significant for elevated WBCs.  Urine culture is pending.    Blood cultures are pending.    X-ray of right hand showed no acute findings.    ED provider gave patient Tdap vaccine injection.  Patient was also started on Unasyn.    Patient admitted for hand infection.    Overview/Hospital Course:  She was admitted to the [facility]- service for further care. IV abx were ordered. Following blood cultures and urine cultures. Tdap was given in the ED. Will cont IV unasyn for now.   1/29-Urine cultures with no growth. Blood cultures with ngtd. Still following cultures from the hand. Will cont Unasyn for now for right hand animal scratch. Will ask hand sx to evaluate given the increased in swelling on yesterday.  1/30- Patient seen and examined today. She report feeling better today. Vital signs stable. Plan I & D of right hand today.     Interval History: I & D of cat bite plan for  today, continue antibiotic.     Review of Systems   Constitutional:  Negative for fatigue and fever.   HENT:  Negative for trouble swallowing.    Respiratory:  Negative for cough and shortness of breath.    Cardiovascular:  Negative for chest pain.   Gastrointestinal:  Negative for diarrhea, nausea and vomiting.   Genitourinary:  Negative for frequency.   Musculoskeletal:  Negative for gait problem.   Skin:  Positive for wound (right hand).   Hematological:  Bruises/bleeds easily.   Psychiatric/Behavioral:  Negative for confusion.      Objective:     Vital Signs (Most Recent):  Temp: 97.9 °F (36.6 °C) (01/30/24 1138)  Pulse: 71 (01/30/24 1138)  Resp: 18 (01/30/24 1138)  BP: (!) 109/54 (01/30/24 1138)  SpO2: 96 % (01/30/24 1138) Vital Signs (24h Range):  Temp:  [97.5 °F (36.4 °C)-98.4 °F (36.9 °C)] 97.9 °F (36.6 °C)  Pulse:  [68-81] 71  Resp:  [17-18] 18  SpO2:  [95 %-97 %] 96 %  BP: (109-159)/(53-70) 109/54     Weight: 78.7 kg (173 lb 8 oz)  Body mass index is 28.87 kg/m².    Intake/Output Summary (Last 24 hours) at 1/30/2024 1212  Last data filed at 1/30/2024 0342  Gross per 24 hour   Intake 200 ml   Output 1300 ml   Net -1100 ml         Physical Exam  Vitals and nursing note reviewed.   Constitutional:       Appearance: Normal appearance.   HENT:      Head: Normocephalic and atraumatic.      Mouth/Throat:      Mouth: Mucous membranes are moist.   Eyes:      Extraocular Movements: Extraocular movements intact.   Cardiovascular:      Rate and Rhythm: Normal rate and regular rhythm.      Pulses: Normal pulses.      Heart sounds: Normal heart sounds.   Pulmonary:      Effort: No respiratory distress.      Breath sounds: Normal breath sounds. No wheezing.   Abdominal:      General: Bowel sounds are normal. There is no distension.      Palpations: Abdomen is soft.      Tenderness: There is no abdominal tenderness. There is no guarding.   Musculoskeletal:         General: Swelling (improved swelling to the right hand)  and tenderness (mild) present.      Cervical back: Normal range of motion and neck supple.   Skin:     General: Skin is warm and dry.      Capillary Refill: Capillary refill takes 2 to 3 seconds.      Findings: Bruising and erythema (right hand) present.   Neurological:      Mental Status: She is oriented to person, place, and time.   Psychiatric:         Mood and Affect: Mood normal.         Behavior: Behavior normal.             Significant Labs: All pertinent labs within the past 24 hours have been reviewed.  BMP:   Recent Labs   Lab 01/30/24  0302   *      K 4.3      CO2 22*   BUN 31*   CREATININE 1.2   CALCIUM 9.0   MG 1.9     CBC:   Recent Labs   Lab 01/29/24  0324 01/30/24  0302   WBC 4.21 4.54   HGB 9.6* 9.8*   HCT 28.0* 28.5*   * 145*       Significant Imaging:     Imaging Results              X-Ray Hand 3 view Right (Final result)  Result time 01/27/24 12:35:56      Final result by Luis Goodwin MD (01/27/24 12:35:56)                   Impression:      No acute findings.      Electronically signed by: Luis Goodwin MD  Date:    01/27/2024  Time:    12:35               Narrative:    EXAMINATION:  XR HAND COMPLETE 3 VIEW RIGHT    CLINICAL HISTORY:  right hand swelling;    TECHNIQUE:  PA, lateral, and oblique views of the right hand were performed.    COMPARISON:  None    FINDINGS:  Scattered degenerative changes, most prominent at the 1st CMC and triscaphe joint, noting marked joint space loss with osteophytosis.  Osteopenia noted.  No fracture identified.  Alignment within normal limits.  Prominent scattered calcific atherosclerosis noted.                                       Assessment/Plan:      * Cat scratch  Patient noted with a cat scratch to her right hand--patient states the cat is her cat and has been for the last 14 years.  -she was seen outpatient and started on Augmentin---patient has failed outpatient therapy  -blood cultures and wound cultures pending  -will cont  Unasyn while inpatient and follow cultures  -1/29:  It was reported to the staff that on yesterday evening the wound was noted to be swollen more so than on admission, nursing stated they placed warm compress to the right hand which helped pustule draining.  We will continue IV Unasyn---leukocytosis improved and patient has been afebrile.  Will ask hand surgery to evaluate.  Follow blood cultures.      Heart failure with preserved ejection fraction  TTE--4/22/23:  The left ventricle is normal in size with concentric hypertrophy and normal systolic function.  The estimated ejection fraction is 55%.  Indeterminate left ventricular diastolic function.  With normal right ventricular systolic function.  Severe left atrial enlargement.  Severe right atrial enlargement.  Mild mitral regurgitation.  Mild to moderate tricuspid regurgitation.  There is pulmonary hypertension.  The estimated PA systolic pressure is 56 mmHg.  Elevated central venous pressure (15 mmHg).  stable      Overweight (BMI 25.0-29.9)  Body mass index is 28.87 kg/m². Morbid obesity complicates all aspects of disease management from diagnostic modalities to treatment. Weight loss encouraged and health benefits explained to patient.        Chronic a-fib  ASA 81 mg PO daily  Cardiac tele   Cont monitoring      Primary hypertension  Chronic, controlled. Latest blood pressure and vitals reviewed-     Temp:  [97.7 °F (36.5 °C)-99 °F (37.2 °C)]   Pulse:  [57-75]   Resp:  [17-20]   BP: (105-150)/(53-65)   SpO2:  [92 %-96 %] .   Home meds for hypertension were reviewed and noted below.   Hypertension Medications               furosemide (LASIX) 20 MG tablet Take 2 tablets in the a.m. for weights 155-159, take 2 tablets twice a day for weights 160 over, none for weights less than 155    spironolactone (ALDACTONE) 25 MG tablet Take 1 tablet (25 mg total) by mouth once daily.            While in the hospital, will manage blood pressure as follows; Continue home  antihypertensive regimen    Will utilize p.r.n. blood pressure medication only if patient's blood pressure greater than 180/110 and she develops symptoms such as worsening chest pain or shortness of breath.      VTE Risk Mitigation (From admission, onward)           Ordered     enoxaparin injection 30 mg  Every 24 hours         01/29/24 1114     IP VTE HIGH RISK PATIENT  Once         01/27/24 1328     Place sequential compression device  Until discontinued         01/27/24 1328                    Discharge Planning   GILES:      Code Status: Full Code   Is the patient medically ready for discharge?:     Reason for patient still in hospital (select all that apply): Patient trending condition and Treatment  Discharge Plan A: Home, Home with family, Home Health                  Walter Grier NP  Department of Hospital Medicine   [facility]

## 2024-01-30 NOTE — PLAN OF CARE
Pt not ready for discharge today. Scheduled for I&D of R hand. Referrals were sent for  services, pt is accepted by [facility] when ready for discharge.  Future Appointments   Date Time Provider Department Center   2/7/2024 10:00 AM Afshan Schaeffer MD [facility] [place] [facility]   2/15/2024 10:00 AM Brent Chapman Jr., MD [facility] CARDIO [place]   2/21/2024  1:00 PM Reed Ruiz MD [facility] IM [place]   4/25/2024 10:45 AM Lennie Louis DPM [facility] POD [address]      01/30/24 1405   Post-Acute Status   Post-Acute Authorization Home Health   Home Health Status Referrals Sent   Coverage [facility] Resources/Appts/Education Provided Appointments scheduled and added to AVS   Discharge Delays None known at this time   Discharge Plan   Discharge Plan A Home;Home with family;Home Health

## 2024-01-30 NOTE — ASSESSMENT & PLAN NOTE
Body mass index is 28.87 kg/m². Morbid obesity complicates all aspects of disease management from diagnostic modalities to treatment. Weight loss encouraged and health benefits explained to patient.

## 2024-01-30 NOTE — NURSING
Contacted Dr. Ojeda for clarification on NPO for patient because order says NPO at 0500 but okay for patient to have early breakfast. Per Dr. ALCANTAR it will be okay for patient to have breakfast and then be NPO.

## 2024-01-30 NOTE — ANESTHESIA PREPROCEDURE EVALUATION
2024  Jenniffer Jimenez is a 89 y.o., female for IRRIGATION AND DEBRIDEMENT, UPPER EXTREMITY (Right)  Past Medical History:   Diagnosis Date    Amblyopia of left eye 04/10/2013    Arthritis     Facet arthropathy, Lumbosacral    Atherosclerosis of aorta     Cataract     Central retinal vein occlusion of left eye     CKD (chronic kidney disease) stage 3, GFR 30-59 ml/min     Diabetes mellitus, type 2     Diabetic polyneuropathy 2022    Exotropia of both eyes 2013    recession RSR 5.0mm w/ adj; recession LR os 5.0 w/ adj; resect MR os  4.0mm    Hearing loss     History of resection of small bowel     Hypertensive retinopathy of both eyes     Hypoglycemia     Macular degeneration     OA (osteoarthritis) of shoulder     Right    Osteoporosis     Posterior vitreous detachment of both eyes     Psychiatric problem     Rhinitis     TIA (transient ischemic attack)      Past Surgical History:   Procedure Laterality Date    APPENDECTOMY      CARDIAC CATHETERIZATION      CATARACT EXTRACTION W/  INTRAOCULAR LENS IMPLANT Bilateral      SECTION, CLASSIC      CLOSURE OF LEFT ATRIAL APPENDAGE USING DEVICE N/A 2020    Procedure: Left atrial appendage closure device;  Surgeon: Abundio Curtis MD;  Location: [facility] CATH LAB;  Service: Cardiology;  Laterality: N/A;    HYSTERECTOMY      INNER EAR SURGERY      JOINT REPLACEMENT      LEFT KNEE REPLACEMENT IN  -    OOPHORECTOMY      SINUS SURGERY      STRABISMUS SURGERY  13    RSR recession 5 mm, LLR recession 5 mm and LMR resection 4mm    STRABISMUS SURGERY  2014    recess LR OD 6mm    TONSILLECTOMY      watchman surgery N/A 2020     Pre-op Assessment    I have reviewed the Patient Summary Reports.     I have reviewed the Nursing Notes. I have reviewed the NPO Status.   I have reviewed the Medications.     Review of  Systems  Anesthesia Hx:  No problems with previous Anesthesia             Denies Family Hx of Anesthesia complications.    Denies Personal Hx of Anesthesia complications.                    Social:  Non-Smoker       Cardiovascular:     Hypertension    Dysrhythmias atrial fibrillation  CHF    hyperlipidemia                       Hypertension     Atrial Fibrillation     Pulmonary:  Pulmonary Normal                       Renal/:  Chronic Renal Disease, CKD        Kidney Function/Disease             Hepatic/GI:  Hepatic/GI Normal                 Musculoskeletal:  Arthritis   Infected cat bite      Arthritis          Neurological:  TIA,  Neuromuscular Disease,  Headaches     Requires hearing aids  Dx of Headaches   Arthritis               TIA - Transient Ischemic Attack             Neuromuscular Disease   Endocrine:  Diabetes    Diabetes                      Psych:  Psychiatric History                  Physical Exam  General: Well nourished and Cooperative    Airway:  Mallampati: II   TM Distance: Normal  Neck ROM: Normal ROM    Summary    The left ventricle is normal in size with concentric remodeling and normal systolic function.  The estimated ejection fraction is 55-60%.  Grade III left ventricular diastolic dysfunction.  Mild mitral regurgitation.  Normal right ventricular size with mildly reduced right ventricular systolic function.  Severe tricuspid regurgitation.  The estimated PA systolic pressure is 52 mmHg. PASP may be under-estimated due to TR severity.  Elevated central venous pressure (15 mmHg).  There is pulmonary hypertension.  Severe left atrial enlargement.       Anesthesia Plan  Type of Anesthesia, risks & benefits discussed:    Anesthesia Type: Gen Natural Airway  Intra-op Monitoring Plan: Standard ASA Monitors  Post Op Pain Control Plan: multimodal analgesia and IV/PO Opioids PRN  Induction:  IV  Informed Consent: Informed consent signed with the Patient and all parties understand the risks and  agree with anesthesia plan.  All questions answered.   ASA Score: 3  Day of Surgery Review of History & Physical: H&P Update referred to the surgeon/provider.    Ready For Surgery From Anesthesia Perspective.     .

## 2024-01-31 LAB
GRAM STN SPEC: NORMAL
GRAM STN SPEC: NORMAL

## 2024-01-31 PROCEDURE — 63700000 PHARM REV CODE 250 ALT 637 W/O HCPCS: Mod: HCNC | Performed by: ORTHOPAEDIC SURGERY

## 2024-01-31 PROCEDURE — 99900035 HC TECH TIME PER 15 MIN (STAT): Mod: HCNC

## 2024-01-31 PROCEDURE — 94761 N-INVAS EAR/PLS OXIMETRY MLT: CPT | Mod: HCNC

## 2024-01-31 PROCEDURE — 63600175 PHARM REV CODE 636 W HCPCS: Mod: HCNC | Performed by: ORTHOPAEDIC SURGERY

## 2024-01-31 PROCEDURE — A4216 STERILE WATER/SALINE, 10 ML: HCPCS | Mod: HCNC | Performed by: ORTHOPAEDIC SURGERY

## 2024-01-31 PROCEDURE — 25000003 PHARM REV CODE 250: Mod: HCNC | Performed by: HOSPITALIST

## 2024-01-31 PROCEDURE — 25000003 PHARM REV CODE 250: Mod: HCNC | Performed by: NURSE PRACTITIONER

## 2024-01-31 PROCEDURE — 25000003 PHARM REV CODE 250: Mod: HCNC | Performed by: ORTHOPAEDIC SURGERY

## 2024-01-31 PROCEDURE — 63600175 PHARM REV CODE 636 W HCPCS: Mod: HCNC | Performed by: HOSPITALIST

## 2024-01-31 PROCEDURE — 11000001 HC ACUTE MED/SURG PRIVATE ROOM: Mod: HCNC

## 2024-01-31 RX ADMIN — FERROUS SULFATE TAB 325 MG (65 MG ELEMENTAL FE) 1 EACH: 325 (65 FE) TAB at 09:01

## 2024-01-31 RX ADMIN — DOCUSATE SODIUM AND SENNOSIDES 1 TABLET: 8.6; 5 TABLET, FILM COATED ORAL at 08:01

## 2024-01-31 RX ADMIN — Medication 10 ML: at 11:01

## 2024-01-31 RX ADMIN — AMPICILLIN SODIUM AND SULBACTAM SODIUM 3 G: 2; 1 INJECTION, POWDER, FOR SOLUTION INTRAMUSCULAR; INTRAVENOUS at 02:01

## 2024-01-31 RX ADMIN — ASPIRIN 81 MG: 81 TABLET, COATED ORAL at 09:01

## 2024-01-31 RX ADMIN — SIMETHICONE ANTIGAS 1.56 G: 125 TABLET, CHEWABLE ORAL at 09:01

## 2024-01-31 RX ADMIN — PRAVASTATIN SODIUM 40 MG: 40 TABLET ORAL at 08:01

## 2024-01-31 RX ADMIN — ENOXAPARIN SODIUM 30 MG: 30 INJECTION SUBCUTANEOUS at 05:01

## 2024-01-31 RX ADMIN — Medication 10 ML: at 02:01

## 2024-01-31 RX ADMIN — SPIRONOLACTONE 25 MG: 25 TABLET, FILM COATED ORAL at 09:01

## 2024-01-31 RX ADMIN — Medication 400 UNITS: at 09:01

## 2024-01-31 RX ADMIN — AMPICILLIN SODIUM AND SULBACTAM SODIUM 3 G: 2; 1 INJECTION, POWDER, FOR SOLUTION INTRAMUSCULAR; INTRAVENOUS at 11:01

## 2024-01-31 RX ADMIN — DOCUSATE SODIUM AND SENNOSIDES 1 TABLET: 8.6; 5 TABLET, FILM COATED ORAL at 09:01

## 2024-01-31 RX ADMIN — SIMETHICONE ANTIGAS 1.56 G: 125 TABLET, CHEWABLE ORAL at 02:01

## 2024-01-31 RX ADMIN — Medication 10 ML: at 06:01

## 2024-01-31 RX ADMIN — ACETAMINOPHEN 650 MG: 325 TABLET ORAL at 05:01

## 2024-01-31 NOTE — ANESTHESIA POSTPROCEDURE EVALUATION
Anesthesia Post Evaluation    Patient: Jenniffer Jimenez    Procedure(s) Performed: Procedure(s) (LRB):  IRRIGATION AND DEBRIDEMENT, UPPER EXTREMITY (Right)    Final Anesthesia Type: general      Patient location during evaluation: PACU  Patient participation: Yes- Able to Participate  Level of consciousness: awake and alert  Post-procedure vital signs: reviewed and stable  Pain management: adequate  Airway patency: patent    PONV status at discharge: No PONV  Anesthetic complications: no      Cardiovascular status: stable  Respiratory status: room air  Hydration status: euvolemic  Follow-up not needed.              Vitals Value Taken Time   /58 01/31/24 0801   Temp 36.6 °C (97.8 °F) 01/31/24 0801   Pulse 69 01/31/24 0815   Resp 16 01/31/24 0815   SpO2 94 % 01/31/24 0815         Event Time   Out of Recovery 01/30/2024 19:41:25         Pain/Hammad Score: Hammad Score: 9 (1/30/2024  7:30 PM)

## 2024-01-31 NOTE — TRANSFER OF CARE
"Anesthesia Transfer of Care Note    Patient: Jenniffer Jimenez    Procedure(s) Performed: Procedure(s) (LRB):  IRRIGATION AND DEBRIDEMENT, UPPER EXTREMITY (Right)    Patient location: PACU    Anesthesia Type: general    Transport from OR: Transported from OR on room air with adequate spontaneous ventilation    Post pain: adequate analgesia    Post assessment: no apparent anesthetic complications and tolerated procedure well    Post vital signs: stable    Level of consciousness: awake    Nausea/Vomiting: no nausea/vomiting    Complications: none    Transfer of care protocol was followed      Last vitals: Visit Vitals  BP (!) 146/89   Pulse 71   Temp 36.5 °C (97.7 °F)   Resp (!) 22   Ht 5' 5" (1.651 m)   Wt 78.7 kg (173 lb 8 oz)   LMP  (LMP Unknown)   SpO2 (!) 94%   Breastfeeding No   BMI 28.87 kg/m²     "

## 2024-01-31 NOTE — PLAN OF CARE
Pt is not medically ready for discharge to home today. Presently on IV antibiotics. Plan is to discharge to home with home health services by [facility] [place] who has agreed to accept pt.  Future Appointments   Date Time Provider Department Center   2/7/2024 10:00 AM Afshan Schaeffer MD [facility]   2/15/2024 10:00 AM Brent Chapman Jr., MD [facility] [place]   2/21/2024  1:00 PM Reed Ruiz MD [facility] [place]   4/25/2024 10:45 AM Lennie Louis DPM [facility] POD [address]      01/31/24 1202   Post-Acute Status   Post-Acute Authorization Home Health   Home Health Status Pending medical clearance/testing   Coverage [employer] Managed   Hospital Resources/Appts/Education Provided Appointments scheduled and added to AVS   Discharge Delays None known at this time   Discharge Plan   Discharge Plan A Independent living facility;Home Health

## 2024-01-31 NOTE — SUBJECTIVE & OBJECTIVE
Interval History:  Postop day 1 I and D to the right hand.  Continue antibiotic treatment.  Following cultures--sensitivities pending.    Review of Systems   Constitutional:  Negative for fatigue and fever.   HENT:  Negative for trouble swallowing.    Respiratory:  Negative for cough and shortness of breath.    Cardiovascular:  Negative for chest pain.   Gastrointestinal:  Negative for diarrhea, nausea and vomiting.   Genitourinary:  Negative for frequency.   Musculoskeletal:  Negative for gait problem.   Skin:  Positive for wound (right hand).   Hematological:  Bruises/bleeds easily.   Psychiatric/Behavioral:  Negative for confusion.      Objective:     Vital Signs (Most Recent):  Temp: 97.8 °F (36.6 °C) (01/31/24 0801)  Pulse: 69 (01/31/24 0815)  Resp: 16 (01/31/24 0815)  BP: (!) 113/58 (01/31/24 0801)  SpO2: (!) 94 % (01/31/24 0815) Vital Signs (24h Range):  Temp:  [97.5 °F (36.4 °C)-98.4 °F (36.9 °C)] 97.8 °F (36.6 °C)  Pulse:  [59-76] 69  Resp:  [13-22] 16  SpO2:  [91 %-96 %] 94 %  BP: (108-146)/(53-89) 113/58     Weight: 78.7 kg (173 lb 8 oz)  Body mass index is 28.87 kg/m².    Intake/Output Summary (Last 24 hours) at 1/31/2024 1107  Last data filed at 1/31/2024 0819  Gross per 24 hour   Intake 555 ml   Output 900 ml   Net -345 ml           Physical Exam  Vitals and nursing note reviewed.   Constitutional:       Appearance: Normal appearance.   HENT:      Head: Normocephalic and atraumatic.      Mouth/Throat:      Mouth: Mucous membranes are moist.   Eyes:      Extraocular Movements: Extraocular movements intact.   Cardiovascular:      Rate and Rhythm: Normal rate and regular rhythm.      Pulses: Normal pulses.      Heart sounds: Normal heart sounds.   Pulmonary:      Effort: No respiratory distress.      Breath sounds: Normal breath sounds. No wheezing.   Abdominal:      General: Bowel sounds are normal. There is no distension.      Palpations: Abdomen is soft.      Tenderness: There is no abdominal  tenderness. There is no guarding.   Musculoskeletal:         General: Swelling (improved swelling to the right hand) and tenderness (mild) present.      Cervical back: Normal range of motion and neck supple.   Skin:     General: Skin is warm and dry.      Capillary Refill: Capillary refill takes 2 to 3 seconds.      Findings: Bruising and erythema (right hand) present.   Neurological:      Mental Status: She is oriented to person, place, and time.   Psychiatric:         Mood and Affect: Mood normal.         Behavior: Behavior normal.             Significant Labs: All pertinent labs within the past 24 hours have been reviewed.  BMP:   Recent Labs   Lab 01/30/24  0302   *      K 4.3      CO2 22*   BUN 31*   CREATININE 1.2   CALCIUM 9.0   MG 1.9       CBC:   Recent Labs   Lab 01/30/24  0302   WBC 4.54   HGB 9.8*   HCT 28.5*   *         Significant Imaging:     Imaging Results              X-Ray Hand 3 view Right (Final result)  Result time 01/27/24 12:35:56      Final result by Luis Goodwin MD (01/27/24 12:35:56)                   Impression:      No acute findings.      Electronically signed by: Luis Goodwin MD  Date:    01/27/2024  Time:    12:35               Narrative:    EXAMINATION:  XR HAND COMPLETE 3 VIEW RIGHT    CLINICAL HISTORY:  right hand swelling;    TECHNIQUE:  PA, lateral, and oblique views of the right hand were performed.    COMPARISON:  None    FINDINGS:  Scattered degenerative changes, most prominent at the 1st CMC and triscaphe joint, noting marked joint space loss with osteophytosis.  Osteopenia noted.  No fracture identified.  Alignment within normal limits.  Prominent scattered calcific atherosclerosis noted.

## 2024-01-31 NOTE — PROGRESS NOTES
"[facility]  Orthopedics  Progress Note    Patient Name: Jenniffer Jimenez  MRN: [id number]  Admission Date: 1/27/2024  Hospital Length of Stay: 4 days  Attending Provider: José Miguel Beverly MD  Primary Care Provider: Reed Ruiz MD  Follow-up For: Procedure(s) (LRB):  IRRIGATION AND DEBRIDEMENT, UPPER EXTREMITY (Right)    Post-Operative Day: 1 Day Post-Op  Subjective:     Principal Problem:Infected cat bite of hand, right, initial encounter    Principal Orthopedic Problem:   R hand abscess s/p cat bite    2. Postop Day #1 I&D with intraoperative cultures to R hand abscess    Interval History: Ms. Jimenez was seen at the bedside.  She notes good pain control.  Patient admits to spontaneously using her right hand without difficulty.    Review of patient's allergies indicates:   Allergen Reactions    Opioids - morphine analogues Other (See Comments)     Bowel issues; bowel obstruction    Tizanidine Other (See Comments)     "Lips were numb,  Almost passed out."    Tramadol Hallucinations    Beta-blockers (beta-adrenergic blocking agts) Other (See Comments)     Can not go on beta blockers for long period of time - due to taking allergy injections    Morphine     Opioids-meperidine and related     Ciprofloxacin Rash       Current Facility-Administered Medications   Medication    acetaminophen tablet 650 mg    albuterol-ipratropium 2.5 mg-0.5 mg/3 mL nebulizer solution 3 mL    aluminum-magnesium hydroxide-simethicone 200-200-20 mg/5 mL suspension 30 mL    ampicillin-sulbactam (UNASYN) 3 g in sodium chloride 0.9 % 100 mL IVPB (MB+)    aspirin EC tablet 81 mg    enoxaparin injection 30 mg    ferrous sulfate tablet 1 each    HYDROmorphone (PF) injection 0.2 mg    ketorolac injection 15 mg    melatonin tablet 6 mg    ondansetron disintegrating tablet 8 mg    ondansetron injection 4 mg    ondansetron injection 4 mg    oxyCODONE immediate release tablet 5 mg    oxyCODONE-acetaminophen 5-325 mg per tablet 1 tablet    " "pravastatin tablet 40 mg    psyllium capsule 1.56 g    senna-docusate 8.6-50 mg per tablet 1 tablet    simethicone chewable tablet 80 mg    sodium chloride 0.9% flush 10 mL    spironolactone tablet 25 mg    vitamin E capsule 400 Units     Objective:     Vital Signs (Most Recent):  Temp: 97.6 °F (36.4 °C) (01/31/24 1122)  Pulse: 77 (01/31/24 1233)  Resp: 18 (01/31/24 1122)  BP: 139/65 (01/31/24 1122)  SpO2: 98 % (01/31/24 1122) Vital Signs (24h Range):  Temp:  [97.5 °F (36.4 °C)-98.4 °F (36.9 °C)] 97.6 °F (36.4 °C)  Pulse:  [59-77] 77  Resp:  [13-22] 18  SpO2:  [91 %-98 %] 98 %  BP: (108-146)/(53-89) 139/65     Weight: 78.7 kg (173 lb 8 oz)  Height: 5' 5" (165.1 cm)  Body mass index is 28.87 kg/m².      Intake/Output Summary (Last 24 hours) at 1/31/2024 1508  Last data filed at 1/31/2024 0819  Gross per 24 hour   Intake 555 ml   Output 500 ml   Net 55 ml       Ortho/SPM Exam  General:  Alert and oriented x3, no acute distress, sitting in a chair at the bedside    Right hand examination  Surgical dressing intact with mild-to-moderate sanguinous output.  Dressing removed at the bedside.  Iodoform gauze intact to 2 sites.  No surrounding redness or active purulent drainage to wounds.  Sensation grossly intact distally  Brisk cap refill to finger pads  Patient able to flex and extend digits to command without pain or difficulty    Significant Labs:       Component 1 d ago   Gram Stain Result Moderate WBC's   Gram Stain Result Rare Gram positive cocci        AFB CULTURE STAIN No acid fast bacilli seen.     Cultures in process for fungus, aerobic and anaerobic    Assessment/Plan:     Active Diagnoses:    Diagnosis Date Noted POA    PRINCIPAL PROBLEM:  Infected cat bite of hand, right, initial encounter [S61.451A, L08.9, W55.01XA] 01/30/2024 Yes    Cat scratch [W55.03XA] 01/26/2024 Yes     Chronic    Heart failure with preserved ejection fraction [I50.30] 04/24/2023 Yes     Chronic    Overweight (BMI 25.0-29.9) [E66.3] " 01/21/2019 Yes    Chronic a-fib [I48.20] 01/29/2016 Yes     Chronic    Primary hypertension [I10]  Yes     Chronic      Problems Resolved During this Admission:     89yo F s/p I&D right hand abscess with intraoperative cultures    Wound care: Iodoform packing was removed at the bedside.  Dressing exchange performed by ortho    Pain control: Multimodal analgesia.  Patient notes good pain control at present    Activity: Patient encouraged on digit range of motion.  No heavy lifting    ID:  Patient currently on Unasyn antibiotic coverage.  Plan to deescalate once cultures resolve.  Management per hospital team    IM:  Patient admitted to hospital team.  Appreciate recs    Dispo: CM following. Plan to d/c home with [facility] once clt complete and abx selected    Plan of care discussed with JUSTINO BrownC  Orthopedics  [place] [facility]

## 2024-01-31 NOTE — PHYSICIAN QUERY
PT Name: Jenniffer Jimenez  MR #: [id number]     DOCUMENTATION CLARIFICATION     CDS: Thania Valdez RN      Contact information:[email or web address]  This form is a permanent document in the medical record.     Query Date: January 31, 2024    By submitting this query, we are merely seeking further clarification of documentation.  Please utilize your independent clinical judgment when addressing the question(s) below.    The Medical Record contains the following   Indicators Supporting Clinical Findings Location in Medical Record   x Heart Failure documented Heart failure with preserved ejection fraction        stable    H&P - 1/30 HM PNs    BNP     x EF/Echo TTE--4/22/23:  The left ventricle is normal in size with concentric hypertrophy and normal systolic function.  The estimated ejection fraction is 55%.  Indeterminate left ventricular diastolic function.  With normal right ventricular systolic function.  Severe left atrial enlargement.  Severe right atrial enlargement.  Mild mitral regurgitation.  Mild to moderate tricuspid regurgitation.  There is pulmonary hypertension.  The estimated PA systolic pressure is 56 mmHg.  Elevated central venous pressure (15 mmHg).   H&P    Radiology findings     x Subjective/Objective Respiratory Conditions Triage vitals exam significant for tachypnea  H&P    Recent/Current MI      Heart Transplant, LVAD      Edema, JVD      Ascites     x Diuretics/Meds Current Outpatient Medications on File Prior to Encounter:     furosemide (LASIX) 20 MG tablet,  Take 2 tablets in the a.m. for weights 155-159, take 2 tablets twice a day for weights 160 over, none for weights less than 155     H&P    Other Treatment      Other       Heart failure is a clinical diagnosis which includes symptomatic fluid retention, elevated intracardiac pressures, and/or the inability of the heart to deliver adequate blood flow.    Heart Failure with reduced Ejection Fraction (HFrEF) or Systolic Heart  Failure (loses ability to contract normally, EF is <40%)    Heart Failure with preserved Ejection Fraction (HFpEF) or Diastolic Heart Failure (stiff ventricles, does not relax properly, EF is >50%)     Heart Failure with Combined Systolic and Diastolic Failure (stiff ventricles, does not relax properly and EF is <50%)    Mid-range or mildly reduced ejection fraction (HFmrEF) is classified as systolic heart failure.  Congestive heart failure with a recovered EF is classified as Diastolic Heart Failure.  Common clues to acute exacerbation:  Rapidly progressive symptoms (w/in 2 weeks of presentation), using IV diuretics, using supplemental O2, pulmonary edema on Xray, new or worsening pleural effusion, +JVD or other signs of volume overload, MI w/in 4 weeks, and/or BNP >500  The clinical guidelines noted are only system guidelines, and do not replace the providers clinical judgment.    Provider, please specify the Acuity/Chronicity of the Heart Failure with Preserved EF associated with the above clinical findings.    [ x  ]  Chronic - preexisting and stable   [   ]  Other (please specify): ___________________________________   [  ]  Clinically Undetermined       Please document in your progress notes daily for the duration of treatment until resolved and include in your discharge summary.    References:  American Heart Association editorial staff. (2017, May). Ejection Fraction Heart Failure Measurement. American Heart Association. https://www.heart.org/en/health-topics/heart-failure/diagnosing-heart-failure/ejection-fraction-heart-failure-measurement#:~:text=Ejection%20fraction%20(EF)%20is%20a,pushed%20out%20with%20each%20heartbeat  MEHRDAD Sanchez (2020, December 15). Heart failure with preserved ejection fraction: Clinical manifestations and diagnosis. [employer]. https://www.[employer].com/contents/heart-failure-with-preserved-ejection-fraction-clinical-manifestations-and-diagnosis.  ICD-10-CM/PCS Coding Clinic [facility]  Quarter ICD-10, Effective with discharges: September 8, 2020 [facility] § Heart failure with mid-range or mildly reduced ejection fraction (2020).  ICD-10-CM/PCS Coding Clinic Third Quarter ICD-10, Effective with discharges: September 8, 2020 [facility] § Heart failure with recovered ejection fraction (2020).  Form No. 94136

## 2024-01-31 NOTE — OP NOTE
[facility]  Surgery Department  Operative Note    SUMMARY     Date of Procedure: 1/30/2024     Procedure: Procedure(s) (LRB):  IRRIGATION AND DEBRIDEMENT, UPPER EXTREMITY (Right)     Surgeon(s) and Role:     * Con Moran Jr., MD - Primary    Assisting Surgeon: None    Pre-Operative Diagnosis: Infected cat bite of hand, right, initial encounter [S61.451A, L08.9, W55.01XA]    Post-Operative Diagnosis: Post-Op Diagnosis Codes:     * Infected cat bite of hand, right, initial encounter [S61.451A, L08.9, W55.01XA]    Anesthesia: Local MAC    Operative Findings (including complications, if any):  Abscess right hand dorsal    Description of Technical Procedures:     Preop diagnosis:  Abscess right hand dorsal.      Postop diagnosis: Same.      Operative procedure:  Excisional debridement abscess right hand X 2.      Surgeon:  Dean.      Anesthesia:  Mac.      EBL: Minimal.      Specimen: Cultures.      Operative procedure in detail as follows:     After operative consent was obtained patient brought the operating room placed supine operating table.  Anesthesia by IV sedation followed by injection of xylocaine Marcaine combination over the dorsum of the right hand and thumb.  Tourniquet applied right arm right upper extremity prepped and draped out in the normal sterile fashion.  The Esmarch used to exsanguinated and the tourniquet inflated 225 mmHg.      Following this a dorsal longitudinal incision made over the abscess at the base of the right thumb with a 15 blade careful dissection used to probe into the abscess purulent material was encountered and sent for culture.  The abscess continued down around the volar aspect of the thumb involving the thenar compartment this area was thoroughly irrigated with antibiotic saline solution excisional debridement was performed of the soft tissue down to the fascia but not the bone or joint.  A 2nd incision made over the dorsum of the right hand near the 2nd  metacarpal through a small abscess in a similar manner purulent material was expressed irrigated and debrided sharply with the scissors and scalpel.  Both wounds were then packed with iodoform gauze and not closed.  Sterile bandage applied followed by light wrap and the tourniquet deflated brought to the recovery room in stable condition all sponge needle counts reported as correct no complications    Significant Surgical Tasks Conducted by the Assistant(s), if Applicable: na    Estimated Blood Loss (EBL): * No values recorded between 1/30/2024  6:20 PM and 1/30/2024  6:38 PM *           Implants: * No implants in log *    Specimens:   Specimen (24h ago, onward)      None                    Condition: Good    Disposition: PACU - hemodynamically stable.    Attestation: I was present and scrubbed for the entire procedure.

## 2024-01-31 NOTE — PROGRESS NOTES
Pharmacist Renal Dose Adjustment Note    Jenniffer Jimenez is a 89 y.o. female being treated with the medication unasyn    Patient Data:    Vital Signs (Most Recent):  Temp: 97.6 °F (36.4 °C) (01/31/24 1122)  Pulse: 77 (01/31/24 1233)  Resp: 18 (01/31/24 1122)  BP: 139/65 (01/31/24 1122)  SpO2: 98 % (01/31/24 1122) Vital Signs (72h Range):  Temp:  [97.5 °F (36.4 °C)-99 °F (37.2 °C)]   Pulse:  [57-81]   Resp:  [13-22]   BP: (105-159)/(53-89)   SpO2:  [91 %-98 %]      Recent Labs   Lab 01/28/24  0249 01/29/24  0324 01/30/24  0302   CREATININE 1.4 1.5* 1.2     Serum creatinine: 1.2 mg/dL 01/30/24 0302  Estimated creatinine clearance: 31.7 mL/min    Medication:unasyn dose: 3grams frequency q12 will be changed to medication:unasyn dose:3grams frequency:q8h    Pharmacist's Name: Afshin Mcneil  Pharmacist's Extension: [phone]

## 2024-01-31 NOTE — ASSESSMENT & PLAN NOTE
Infected cat bite of hand, right, initial encounter      Patient noted with a cat scratch to her right hand--patient states the cat is her cat and has been for the last 14 years.  -she was seen outpatient and started on Augmentin---patient has failed outpatient therapy  -blood cultures and wound cultures pending  -will cont Unasyn while inpatient and follow cultures  -1/29:  It was reported to the staff that on yesterday evening the wound was noted to be swollen more so than on admission, nursing stated they placed warm compress to the right hand which helped pustule draining.  We will continue IV Unasyn---leukocytosis improved and patient has been afebrile.  Will ask hand surgery to evaluate.  Follow blood cultures.  -1/31:  Appreciate Dr. Moran--status post I&D to the right hand.  Following surgical cultures.  Awaiting culture sensitivities.

## 2024-01-31 NOTE — ASSESSMENT & PLAN NOTE
Chronic, controlled. Latest blood pressure and vitals reviewed-     Temp:  [97.5 °F (36.4 °C)-98.4 °F (36.9 °C)]   Pulse:  [59-76]   Resp:  [13-22]   BP: (108-146)/(53-89)   SpO2:  [91 %-96 %] .   Home meds for hypertension were reviewed and noted below.   Hypertension Medications               furosemide (LASIX) 20 MG tablet Take 2 tablets in the a.m. for weights 155-159, take 2 tablets twice a day for weights 160 over, none for weights less than 155    spironolactone (ALDACTONE) 25 MG tablet Take 1 tablet (25 mg total) by mouth once daily.            While in the hospital, will manage blood pressure as follows; Continue home antihypertensive regimen    Will utilize p.r.n. blood pressure medication only if patient's blood pressure greater than 180/110 and she develops symptoms such as worsening chest pain or shortness of breath.

## 2024-01-31 NOTE — PROGRESS NOTES
[facility] Medicine  Progress Note    Patient Name: Jenniffer Jimenez  MRN: [id number]  Patient Class: IP- Inpatient   Admission Date: 1/27/2024  Length of Stay: 4 days  Attending Physician: José Miguel Beverly MD  Primary Care Provider: Reed Ruiz MD        Subjective:     Principal Problem:Infected cat bite of hand, right, initial encounter        HPI:  Jenniffer Jimenez is a 89-year-old female with a past medical history of CKD, diabetes, TIA osteoarthritis presents with cat bite.    Patient states that about 4 or 5 days ago, patient was bit by her cat on her right hand.  She was seen by an outside provider and prescribed Augmentin which she started last night.  She has taken 2 doses so far but she has been complaining of worsening painful abscess to the palm site of her hand, which has significant drainage and swelling.  She presents for further care.    Triage vitals exam significant for tachypnea.  Review of systems is significant for tender hand with drainage.  CBC significant for normocytic anemia, thrombocytopenia.  CMP is significant for slightly increased sugar.    UA significant for elevated WBCs.  Urine culture is pending.    Blood cultures are pending.    X-ray of right hand showed no acute findings.    ED provider gave patient Tdap vaccine injection.  Patient was also started on Unasyn.    Patient admitted for hand infection.    Overview/Hospital Course:  She was admitted to the [facility] [place] [facility]- service for further care. IV abx were ordered. Following blood cultures and urine cultures. Tdap was given in the ED. Will cont IV unasyn for now.   1/29-Urine cultures with no growth. Blood cultures with ngtd. Still following cultures from the hand. Will cont Unasyn for now for right hand animal scratch. Will ask hand sx to evaluate given the increased in swelling on yesterday.  1/30- Patient seen and examined today. She report feeling better today. Vital signs stable. Plan I & D of right hand today.    1/31-status post I&D of the right and postop day 1.  Following we will continue Unasyn---can deescalate once sensitivities have resulted.    Interval History:  Postop day 1 I and D to the right hand.  Continue antibiotic treatment.  Following cultures--sensitivities pending.    Review of Systems   Constitutional:  Negative for fatigue and fever.   HENT:  Negative for trouble swallowing.    Respiratory:  Negative for cough and shortness of breath.    Cardiovascular:  Negative for chest pain.   Gastrointestinal:  Negative for diarrhea, nausea and vomiting.   Genitourinary:  Negative for frequency.   Musculoskeletal:  Negative for gait problem.   Skin:  Positive for wound (right hand).   Hematological:  Bruises/bleeds easily.   Psychiatric/Behavioral:  Negative for confusion.      Objective:     Vital Signs (Most Recent):  Temp: 97.8 °F (36.6 °C) (01/31/24 0801)  Pulse: 69 (01/31/24 0815)  Resp: 16 (01/31/24 0815)  BP: (!) 113/58 (01/31/24 0801)  SpO2: (!) 94 % (01/31/24 0815) Vital Signs (24h Range):  Temp:  [97.5 °F (36.4 °C)-98.4 °F (36.9 °C)] 97.8 °F (36.6 °C)  Pulse:  [59-76] 69  Resp:  [13-22] 16  SpO2:  [91 %-96 %] 94 %  BP: (108-146)/(53-89) 113/58     Weight: 78.7 kg (173 lb 8 oz)  Body mass index is 28.87 kg/m².    Intake/Output Summary (Last 24 hours) at 1/31/2024 1107  Last data filed at 1/31/2024 0819  Gross per 24 hour   Intake 555 ml   Output 900 ml   Net -345 ml           Physical Exam  Vitals and nursing note reviewed.   Constitutional:       Appearance: Normal appearance.   HENT:      Head: Normocephalic and atraumatic.      Mouth/Throat:      Mouth: Mucous membranes are moist.   Eyes:      Extraocular Movements: Extraocular movements intact.   Cardiovascular:      Rate and Rhythm: Normal rate and regular rhythm.      Pulses: Normal pulses.      Heart sounds: Normal heart sounds.   Pulmonary:      Effort: No respiratory distress.      Breath sounds: Normal breath sounds. No wheezing.   Abdominal:       General: Bowel sounds are normal. There is no distension.      Palpations: Abdomen is soft.      Tenderness: There is no abdominal tenderness. There is no guarding.   Musculoskeletal:         General: Swelling (improved swelling to the right hand) and tenderness (mild) present.      Cervical back: Normal range of motion and neck supple.   Skin:     General: Skin is warm and dry.      Capillary Refill: Capillary refill takes 2 to 3 seconds.      Findings: Bruising and erythema (right hand) present.   Neurological:      Mental Status: She is oriented to person, place, and time.   Psychiatric:         Mood and Affect: Mood normal.         Behavior: Behavior normal.             Significant Labs: All pertinent labs within the past 24 hours have been reviewed.  BMP:   Recent Labs   Lab 01/30/24  0302   *      K 4.3      CO2 22*   BUN 31*   CREATININE 1.2   CALCIUM 9.0   MG 1.9       CBC:   Recent Labs   Lab 01/30/24  0302   WBC 4.54   HGB 9.8*   HCT 28.5*   *         Significant Imaging:     Imaging Results              X-Ray Hand 3 view Right (Final result)  Result time 01/27/24 12:35:56      Final result by Luis Goodwin MD (01/27/24 12:35:56)                   Impression:      No acute findings.      Electronically signed by: Luis Goodwin MD  Date:    01/27/2024  Time:    12:35               Narrative:    EXAMINATION:  XR HAND COMPLETE 3 VIEW RIGHT    CLINICAL HISTORY:  right hand swelling;    TECHNIQUE:  PA, lateral, and oblique views of the right hand were performed.    COMPARISON:  None    FINDINGS:  Scattered degenerative changes, most prominent at the 1st CMC and triscaphe joint, noting marked joint space loss with osteophytosis.  Osteopenia noted.  No fracture identified.  Alignment within normal limits.  Prominent scattered calcific atherosclerosis noted.                                       Assessment/Plan:      Cat scratch  Infected cat bite of hand, right, initial  encounter      Patient noted with a cat scratch to her right hand--patient states the cat is her cat and has been for the last 14 years.  -she was seen outpatient and started on Augmentin---patient has failed outpatient therapy  -blood cultures and wound cultures pending  -will cont Unasyn while inpatient and follow cultures  -1/29:  It was reported to the staff that on yesterday evening the wound was noted to be swollen more so than on admission, nursing stated they placed warm compress to the right hand which helped pustule draining.  We will continue IV Unasyn---leukocytosis improved and patient has been afebrile.  Will ask hand surgery to evaluate.  Follow blood cultures.  -1/31:  Appreciate Dr. Moran--status post I&D to the right hand.  Following surgical cultures.  Awaiting culture sensitivities.      Heart failure with preserved ejection fraction  TTE--4/22/23:  The left ventricle is normal in size with concentric hypertrophy and normal systolic function.  The estimated ejection fraction is 55%.  Indeterminate left ventricular diastolic function.  With normal right ventricular systolic function.  Severe left atrial enlargement.  Severe right atrial enlargement.  Mild mitral regurgitation.  Mild to moderate tricuspid regurgitation.  There is pulmonary hypertension.  The estimated PA systolic pressure is 56 mmHg.  Elevated central venous pressure (15 mmHg).  stable      Overweight (BMI 25.0-29.9)  Body mass index is 28.87 kg/m². Morbid obesity complicates all aspects of disease management from diagnostic modalities to treatment. Weight loss encouraged and health benefits explained to patient.        Chronic a-fib  ASA 81 mg PO daily  Cardiac tele   Cont monitoring      Primary hypertension  Chronic, controlled. Latest blood pressure and vitals reviewed-     Temp:  [97.5 °F (36.4 °C)-98.4 °F (36.9 °C)]   Pulse:  [59-76]   Resp:  [13-22]   BP: (108-146)/(53-89)   SpO2:  [91 %-96 %] .   Home meds for hypertension  were reviewed and noted below.   Hypertension Medications               furosemide (LASIX) 20 MG tablet Take 2 tablets in the a.m. for weights 155-159, take 2 tablets twice a day for weights 160 over, none for weights less than 155    spironolactone (ALDACTONE) 25 MG tablet Take 1 tablet (25 mg total) by mouth once daily.            While in the hospital, will manage blood pressure as follows; Continue home antihypertensive regimen    Will utilize p.r.n. blood pressure medication only if patient's blood pressure greater than 180/110 and she develops symptoms such as worsening chest pain or shortness of breath.      VTE Risk Mitigation (From admission, onward)           Ordered     enoxaparin injection 30 mg  Every 24 hours         01/29/24 1114     IP VTE HIGH RISK PATIENT  Once         01/27/24 1328     Place sequential compression device  Until discontinued         01/27/24 1328                    Discharge Planning   GILES:      Code Status: Full Code   Is the patient medically ready for discharge?:     Reason for patient still in hospital (select all that apply): Laboratory test, Treatment, Imaging, and Consult recommendations  Discharge Plan A: Home, Home with family, Home Health   Discharge Delays: None known at this time              Pito Shaw NP  Department of Hospital Medicine   [facility]

## 2024-02-01 LAB
BACTERIA BLD CULT: NORMAL
BACTERIA SPEC AEROBE CULT: NO GROWTH

## 2024-02-01 PROCEDURE — 99900035 HC TECH TIME PER 15 MIN (STAT): Mod: HCNC

## 2024-02-01 PROCEDURE — 25000003 PHARM REV CODE 250: Mod: HCNC | Performed by: NURSE PRACTITIONER

## 2024-02-01 PROCEDURE — A4216 STERILE WATER/SALINE, 10 ML: HCPCS | Mod: HCNC | Performed by: ORTHOPAEDIC SURGERY

## 2024-02-01 PROCEDURE — 63600175 PHARM REV CODE 636 W HCPCS: Mod: HCNC | Performed by: HOSPITALIST

## 2024-02-01 PROCEDURE — 11000001 HC ACUTE MED/SURG PRIVATE ROOM: Mod: HCNC

## 2024-02-01 PROCEDURE — 94761 N-INVAS EAR/PLS OXIMETRY MLT: CPT | Mod: HCNC

## 2024-02-01 PROCEDURE — 63700000 PHARM REV CODE 250 ALT 637 W/O HCPCS: Mod: HCNC | Performed by: ORTHOPAEDIC SURGERY

## 2024-02-01 PROCEDURE — 25000003 PHARM REV CODE 250: Mod: HCNC | Performed by: ORTHOPAEDIC SURGERY

## 2024-02-01 PROCEDURE — 25000003 PHARM REV CODE 250: Mod: HCNC | Performed by: HOSPITALIST

## 2024-02-01 RX ORDER — ENOXAPARIN SODIUM 100 MG/ML
40 INJECTION SUBCUTANEOUS EVERY 24 HOURS
Status: DISCONTINUED | OUTPATIENT
Start: 2024-02-01 | End: 2024-02-04 | Stop reason: HOSPADM

## 2024-02-01 RX ADMIN — Medication 10 ML: at 03:02

## 2024-02-01 RX ADMIN — Medication 10 ML: at 05:02

## 2024-02-01 RX ADMIN — AMPICILLIN SODIUM AND SULBACTAM SODIUM 3 G: 2; 1 INJECTION, POWDER, FOR SOLUTION INTRAMUSCULAR; INTRAVENOUS at 04:02

## 2024-02-01 RX ADMIN — SPIRONOLACTONE 25 MG: 25 TABLET, FILM COATED ORAL at 10:02

## 2024-02-01 RX ADMIN — ASPIRIN 81 MG: 81 TABLET, COATED ORAL at 10:02

## 2024-02-01 RX ADMIN — Medication 400 UNITS: at 10:02

## 2024-02-01 RX ADMIN — AMPICILLIN SODIUM AND SULBACTAM SODIUM 3 G: 2; 1 INJECTION, POWDER, FOR SOLUTION INTRAMUSCULAR; INTRAVENOUS at 10:02

## 2024-02-01 RX ADMIN — PRAVASTATIN SODIUM 40 MG: 40 TABLET ORAL at 08:02

## 2024-02-01 RX ADMIN — ENOXAPARIN SODIUM 40 MG: 40 INJECTION SUBCUTANEOUS at 04:02

## 2024-02-01 RX ADMIN — Medication 10 ML: at 11:02

## 2024-02-01 RX ADMIN — DOCUSATE SODIUM AND SENNOSIDES 1 TABLET: 8.6; 5 TABLET, FILM COATED ORAL at 10:02

## 2024-02-01 RX ADMIN — SIMETHICONE ANTIGAS 1.56 G: 125 TABLET, CHEWABLE ORAL at 10:02

## 2024-02-01 RX ADMIN — FERROUS SULFATE TAB 325 MG (65 MG ELEMENTAL FE) 1 EACH: 325 (65 FE) TAB at 10:02

## 2024-02-01 NOTE — SUBJECTIVE & OBJECTIVE
Interval History: Sitting up in bed, reports some mild diplopia. She also reports this happened 2 days ago. Possibly ongoing yesterday. No unilateral weakness or slurred speech. Has had recent anesthesia and pain meds    Review of Systems   Eyes:         Diplopia   Musculoskeletal:  Positive for joint swelling (rthand).   All other systems reviewed and are negative.    Objective:     Vital Signs (Most Recent):  Temp: 97 °F (36.1 °C) (02/01/24 1126)  Pulse: 67 (02/01/24 1241)  Resp: 18 (02/01/24 1126)  BP: (!) 144/63 (02/01/24 1126)  SpO2: 96 % (02/01/24 1126) Vital Signs (24h Range):  Temp:  [97 °F (36.1 °C)-98.4 °F (36.9 °C)] 97 °F (36.1 °C)  Pulse:  [60-78] 67  Resp:  [16-18] 18  SpO2:  [94 %-97 %] 96 %  BP: (114-144)/(56-87) 144/63     Weight: 78.7 kg (173 lb 8 oz)  Body mass index is 28.87 kg/m².    Intake/Output Summary (Last 24 hours) at 2/1/2024 1347  Last data filed at 2/1/2024 0450  Gross per 24 hour   Intake 500 ml   Output 1000 ml   Net -500 ml         Physical Exam  Constitutional:       Appearance: Normal appearance.   HENT:      Head: Normocephalic.      Mouth/Throat:      Mouth: Mucous membranes are dry.   Eyes:      Pupils: Pupils are equal, round, and reactive to light.   Cardiovascular:      Rate and Rhythm: Normal rate. Rhythm irregular.      Pulses: Normal pulses.      Heart sounds: Normal heart sounds.   Pulmonary:      Effort: Pulmonary effort is normal.      Breath sounds: Normal breath sounds.   Abdominal:      General: Bowel sounds are normal.      Palpations: Abdomen is soft.   Musculoskeletal:         General: Normal range of motion.      Cervical back: Normal range of motion.   Skin:     General: Skin is warm and dry.      Capillary Refill: Capillary refill takes less than 2 seconds.   Neurological:      General: No focal deficit present.      Mental Status: She is alert and oriented to person, place, and time. Mental status is at baseline.   Psychiatric:         Mood and Affect: Mood  normal.         Behavior: Behavior normal.             Significant Labs: All pertinent labs within the past 24 hours have been reviewed.  Recent Lab Results       None            Significant Imaging: I have reviewed all pertinent imaging results/findings within the past 24 hours.  I have reviewed and interpreted all pertinent imaging results/findings within the past 24 hours.

## 2024-02-01 NOTE — ASSESSMENT & PLAN NOTE
Infected cat bite of hand, right, initial encounter      Patient noted with a cat scratch to her right hand--patient states the cat is her cat and has been for the last 14 years.  -she was seen outpatient and started on Augmentin---patient has failed outpatient therapy  -blood cultures and wound cultures pending  -will cont Unasyn while inpatient and follow cultures  -1/29:  It was reported to the staff that on yesterday evening the wound was noted to be swollen more so than on admission, nursing stated they placed warm compress to the right hand which helped pustule draining.  We will continue IV Unasyn---leukocytosis improved and patient has been afebrile.  Will ask hand surgery to evaluate.  Follow blood cultures.  -1/31:  Appreciate Dr. Moran--status post I&D to the right hand.  Following surgical cultures.  Awaiting culture sensitivities.  2/1-POD#2, I&D, Cx pending continue Unasyn     Continue with methadone 130 mg orally daily and Valium 10 mg orally twice daily, with goal of continued valium taper as tolerated  Continue with methadone 130 mg orally daily and Valium 10 mg orally twice daily, with goal of continued valium taper as tolerated  Continue with methadone 130 mg orally daily and Valium 10 mg orally twice daily, with goal of continued valium taper as tolerated  Continue with methadone 130 mg orally daily and Valium 10 mg orally twice daily, with goal of continued valium taper as tolerated

## 2024-02-01 NOTE — PLAN OF CARE
Problem: Adult Inpatient Plan of Care  Goal: Plan of Care Review  Outcome: Ongoing, Progressing     Problem: Fall Injury Risk  Goal: Absence of Fall and Fall-Related Injury  Outcome: Ongoing, Progressing     Problem: Heart Failure Comorbidity  Goal: Maintenance of Heart Failure Symptom Control  Outcome: Ongoing, Progressing

## 2024-02-01 NOTE — ASSESSMENT & PLAN NOTE
Chronic, controlled. Latest blood pressure and vitals reviewed-     Temp:  [97 °F (36.1 °C)-98.4 °F (36.9 °C)]   Pulse:  [60-78]   Resp:  [16-18]   BP: (114-144)/(56-87)   SpO2:  [94 %-97 %] .   Home meds for hypertension were reviewed and noted below.   Hypertension Medications               furosemide (LASIX) 20 MG tablet Take 2 tablets in the a.m. for weights 155-159, take 2 tablets twice a day for weights 160 over, none for weights less than 155    spironolactone (ALDACTONE) 25 MG tablet Take 1 tablet (25 mg total) by mouth once daily.            While in the hospital, will manage blood pressure as follows; Continue home antihypertensive regimen    Will utilize p.r.n. blood pressure medication only if patient's blood pressure greater than 180/110 and she develops symptoms such as worsening chest pain or shortness of breath.

## 2024-02-01 NOTE — PROGRESS NOTES
[facility] Medicine  Progress Note    Patient Name: Jenniffer Jimenez  MRN: [id number]  Patient Class: IP- Inpatient   Admission Date: 1/27/2024  Length of Stay: 5 days  Attending Physician: José Miguel Beverly MD  Primary Care Provider: Reed Ruiz MD        Subjective:     Principal Problem:Infected cat bite of hand, right, initial encounter        HPI:  Jenniffer Jimenez is a 89-year-old female with a past medical history of CKD, diabetes, TIA osteoarthritis presents with cat bite.    Patient states that about 4 or 5 days ago, patient was bit by her cat on her right hand.  She was seen by an outside provider and prescribed Augmentin which she started last night.  She has taken 2 doses so far but she has been complaining of worsening painful abscess to the palm site of her hand, which has significant drainage and swelling.  She presents for further care.    Triage vitals exam significant for tachypnea.  Review of systems is significant for tender hand with drainage.  CBC significant for normocytic anemia, thrombocytopenia.  CMP is significant for slightly increased sugar.    UA significant for elevated WBCs.  Urine culture is pending.    Blood cultures are pending.    X-ray of right hand showed no acute findings.    ED provider gave patient Tdap vaccine injection.  Patient was also started on Unasyn.    Patient admitted for hand infection.    Overview/Hospital Course:  She was admitted to the [facility]- service for further care. IV abx were ordered. Following blood cultures and urine cultures. Tdap was given in the ED. Will cont IV unasyn for now.   1/29-Urine cultures with no growth. Blood cultures with ngtd. Still following cultures from the hand. Will cont Unasyn for now for right hand animal scratch. Will ask hand sx to evaluate given the increased in swelling on yesterday.  1/30- Patient seen and examined today. She report feeling better today. Vital signs stable. Plan I & D of right hand today.    1/31-status post I&D of the right and postop day 1.  Following we will continue Unasyn---can deescalate once sensitivities have resulted.  2/1-POD #2 continue Unasyn, sensitivities remain pending    Interval History: Sitting up in bed, reports some mild diplopia. She also reports this happened 2 days ago. Possibly ongoing yesterday. No unilateral weakness or slurred speech. Has had recent anesthesia and pain meds    Review of Systems   Eyes:         Diplopia   Musculoskeletal:  Positive for joint swelling (rthand).   All other systems reviewed and are negative.    Objective:     Vital Signs (Most Recent):  Temp: 97 °F (36.1 °C) (02/01/24 1126)  Pulse: 67 (02/01/24 1241)  Resp: 18 (02/01/24 1126)  BP: (!) 144/63 (02/01/24 1126)  SpO2: 96 % (02/01/24 1126) Vital Signs (24h Range):  Temp:  [97 °F (36.1 °C)-98.4 °F (36.9 °C)] 97 °F (36.1 °C)  Pulse:  [60-78] 67  Resp:  [16-18] 18  SpO2:  [94 %-97 %] 96 %  BP: (114-144)/(56-87) 144/63     Weight: 78.7 kg (173 lb 8 oz)  Body mass index is 28.87 kg/m².    Intake/Output Summary (Last 24 hours) at 2/1/2024 1347  Last data filed at 2/1/2024 0450  Gross per 24 hour   Intake 500 ml   Output 1000 ml   Net -500 ml         Physical Exam  Constitutional:       Appearance: Normal appearance.   HENT:      Head: Normocephalic.      Mouth/Throat:      Mouth: Mucous membranes are dry.   Eyes:      Pupils: Pupils are equal, round, and reactive to light.   Cardiovascular:      Rate and Rhythm: Normal rate. Rhythm irregular.      Pulses: Normal pulses.      Heart sounds: Normal heart sounds.   Pulmonary:      Effort: Pulmonary effort is normal.      Breath sounds: Normal breath sounds.   Abdominal:      General: Bowel sounds are normal.      Palpations: Abdomen is soft.   Musculoskeletal:         General: Normal range of motion.      Cervical back: Normal range of motion.   Skin:     General: Skin is warm and dry.      Capillary Refill: Capillary refill takes less than 2 seconds.    Neurological:      General: No focal deficit present.      Mental Status: She is alert and oriented to person, place, and time. Mental status is at baseline.   Psychiatric:         Mood and Affect: Mood normal.         Behavior: Behavior normal.             Significant Labs: All pertinent labs within the past 24 hours have been reviewed.  Recent Lab Results       None            Significant Imaging: I have reviewed all pertinent imaging results/findings within the past 24 hours.  I have reviewed and interpreted all pertinent imaging results/findings within the past 24 hours.    Assessment/Plan:      * Infected cat bite of hand, right, initial encounter  Patient noted with a cat scratch to her right hand--patient states the cat is her cat and has been for the last 14 years.  -she was seen outpatient and started on Augmentin---patient has failed outpatient therapy  -blood cultures and wound cultures pending  -will cont Unasyn while inpatient and follow cultures  -1/29:  It was reported to the staff that on yesterday evening the wound was noted to be swollen more so than on admission, nursing stated they placed warm compress to the right hand which helped pustule draining.  We will continue IV Unasyn---leukocytosis improved and patient has been afebrile.  Will ask hand surgery to evaluate.  Follow blood cultures.  -1/31:  Appreciate Dr. Moran--status post I&D to the right hand.  Following surgical cultures.  Awaiting culture sensitivities.  2/1-POD #2, I&D, Cx pending continue Unasyn    Cat scratch  Infected cat bite of hand, right, initial encounter      Patient noted with a cat scratch to her right hand--patient states the cat is her cat and has been for the last 14 years.  -she was seen outpatient and started on Augmentin---patient has failed outpatient therapy  -blood cultures and wound cultures pending  -will cont Unasyn while inpatient and follow cultures  -1/29:  It was reported to the staff that on yesterday  evening the wound was noted to be swollen more so than on admission, nursing stated they placed warm compress to the right hand which helped pustule draining.  We will continue IV Unasyn---leukocytosis improved and patient has been afebrile.  Will ask hand surgery to evaluate.  Follow blood cultures.  -1/31:  Appreciate Dr. Moran--status post I&D to the right hand.  Following surgical cultures.  Awaiting culture sensitivities.  2/1-POD#2, I&D, Cx pending continue Unasyn      Heart failure with preserved ejection fraction  TTE--4/22/23:  The left ventricle is normal in size with concentric hypertrophy and normal systolic function.  The estimated ejection fraction is 55%.  Indeterminate left ventricular diastolic function.  With normal right ventricular systolic function.  Severe left atrial enlargement.  Severe right atrial enlargement.  Mild mitral regurgitation.  Mild to moderate tricuspid regurgitation.  There is pulmonary hypertension.  The estimated PA systolic pressure is 56 mmHg.  Elevated central venous pressure (15 mmHg).  stable      Overweight (BMI 25.0-29.9)  Body mass index is 28.87 kg/m². Morbid obesity complicates all aspects of disease management from diagnostic modalities to treatment. Weight loss encouraged and health benefits explained to patient.        Chronic a-fib  Hx watchman device  ASA 81 mg PO daily  Cardiac tele-rate controlled  Cont monitoring      Primary hypertension  Chronic, controlled. Latest blood pressure and vitals reviewed-     Temp:  [97 °F (36.1 °C)-98.4 °F (36.9 °C)]   Pulse:  [60-78]   Resp:  [16-18]   BP: (114-144)/(56-87)   SpO2:  [94 %-97 %] .   Home meds for hypertension were reviewed and noted below.   Hypertension Medications               furosemide (LASIX) 20 MG tablet Take 2 tablets in the a.m. for weights 155-159, take 2 tablets twice a day for weights 160 over, none for weights less than 155    spironolactone (ALDACTONE) 25 MG tablet Take 1 tablet (25 mg total) by  mouth once daily.            While in the hospital, will manage blood pressure as follows; Continue home antihypertensive regimen    Will utilize p.r.n. blood pressure medication only if patient's blood pressure greater than 180/110 and she develops symptoms such as worsening chest pain or shortness of breath.      VTE Risk Mitigation (From admission, onward)           Ordered     enoxaparin injection 40 mg  Every 24 hours         02/01/24 1224     IP VTE HIGH RISK PATIENT  Once         01/27/24 1328     Place sequential compression device  Until discontinued         01/27/24 1328                    Discharge Planning   GILES:      Code Status: Full Code   Is the patient medically ready for discharge?:     Reason for patient still in hospital (select all that apply): Patient trending condition, Laboratory test, Treatment, and Consult recommendations  Discharge Plan A: Independent living facility, Home Health   Discharge Delays: None known at this time              Eron Gentile NP  Department of Hospital Medicine   [facility]

## 2024-02-01 NOTE — PLAN OF CARE
Problem: Adult Inpatient Plan of Care  Goal: Plan of Care Review  2/1/2024 0150 by Sagrario Cota, RN  Outcome: Ongoing, Progressing  Chart and care plan reviewed

## 2024-02-01 NOTE — PLAN OF CARE
CM visited pt at bedside. Not medically ready for discharge to home today. Plan is home with HH services when medically ready.  Future Appointments   Date Time Provider Department Center   2/7/2024 10:00 AM Afshan Schaeffer MD [facility] [place] [facility]   2/15/2024 10:00 AM Brent Chapman Jr., MD [facility] C[facility]IO [place]   2/21/2024  1:00 PM Reed Ruiz MD [facility] IM [place]   4/25/2024 10:45 AM Lennie Louis DPM [facility] POD [address]      02/01/24 1527   Post-Acute Status   Post-Acute Authorization Home Health   Home Health Status Pending medical clearance/testing   Coverage [employer] Managed   Hospital Resources/Appts/Education Provided Appointments scheduled and added to AVS   Discharge Delays None known at this time   Discharge Plan   Discharge Plan A Home;Home with family;Home Health

## 2024-02-01 NOTE — PROGRESS NOTES
Pharmacist Renal Dose Adjustment Note    Jenniffer Jimenez is a 89 y.o. female being treated with the medication enoxaparin    Patient Data:    Vital Signs (Most Recent):  Temp: 97 °F (36.1 °C) (02/01/24 1126)  Pulse: 70 (02/01/24 1126)  Resp: 18 (02/01/24 1126)  BP: (!) 144/63 (02/01/24 1126)  SpO2: 96 % (02/01/24 1126) Vital Signs (72h Range):  Temp:  [97 °F (36.1 °C)-98.4 °F (36.9 °C)]   Pulse:  [59-81]   Resp:  [13-22]   BP: (108-159)/(53-89)   SpO2:  [91 %-98 %]      Recent Labs   Lab 01/28/24  0249 01/29/24  0324 01/30/24  0302   CREATININE 1.4 1.5* 1.2     Serum creatinine: 1.2 mg/dL 01/30/24 0302  Estimated creatinine clearance: 31.7 mL/min    Medication:enoxaparin dose: 40mg frequency q24h will be changed to medication:enoxaparin dose:30mg frequency:q24h    Pharmacist's Name: Afshin Mcneil  Pharmacist's Extension: [phone]

## 2024-02-01 NOTE — NURSING
"RAPID RESPONSE NURSE PROACTIVE ROUNDING NOTE       Time of Visit:     Admit Date: 2024  LOS: 4  Code Status: Full Code   Date of Visit: 2024  : 1932  Age: 89 y.o.  Sex: female  Race: White  Bed: K474/K474 A:   MRN: [id number]  Was the patient discharged from an ICU this admission? No   Was the patient discharged from a PACU within last 24 hours? No   Did the patient receive conscious sedation/general anesthesia in last 24 hours? No   Was the patient in the ED within the past 24 hours? No   Was the patient on NIPPV within the past 24 hours? No   Attending Physician: José Miguel Beverly MD  Primary Service: Hospitalist,Family Medicine   Time spent at the bedside: < 15 min    SITUATION    Notified by bedside RN via phone call  Reason for alert: IV access    Diagnosis: Infected cat bite of hand, right, initial encounter   has a past medical history of Amblyopia of left eye, Arthritis, Atherosclerosis of aorta, Cataract, Central retinal vein occlusion of left eye, CKD (chronic kidney disease) stage 3, GFR 30-59 ml/min, Diabetes mellitus, type 2, Diabetic polyneuropathy, Exotropia of both eyes, Hearing loss, History of resection of small bowel, Hypertensive retinopathy of both eyes, Hypoglycemia, Macular degeneration, OA (osteoarthritis) of shoulder, Osteoporosis, Posterior vitreous detachment of both eyes, Psychiatric problem, Rhinitis, and TIA (transient ischemic attack).    Last Vitals:  Temp: 98 °F (36.7 °C) (1920)  Pulse: 78 (1920)  Resp: 18 (1920)  BP: 128/59 (1920)  SpO2: 96 % (1954)    24 Hour Vitals Range:  Temp:  [97.5 °F (36.4 °C)-98.4 °F (36.9 °C)]   Pulse:  [60-78]   Resp:  [16-18]   BP: (113-139)/(58-65)   SpO2:  [94 %-98 %]     Contacted by bedside RN for PIV access. 20g 2.25" accucath placed to R cephalic vein.    FOLLOW UP    Call back the Rapid Response NurseTono at [phone] for additional questions or concerns.           "

## 2024-02-01 NOTE — ASSESSMENT & PLAN NOTE
Patient noted with a cat scratch to her right hand--patient states the cat is her cat and has been for the last 14 years.  -she was seen outpatient and started on Augmentin---patient has failed outpatient therapy  -blood cultures and wound cultures pending  -will cont Unasyn while inpatient and follow cultures  -1/29:  It was reported to the staff that on yesterday evening the wound was noted to be swollen more so than on admission, nursing stated they placed warm compress to the right hand which helped pustule draining.  We will continue IV Unasyn---leukocytosis improved and patient has been afebrile.  Will ask hand surgery to evaluate.  Follow blood cultures.  -1/31:  Appreciate Dr. Moran--status post I&D to the right hand.  Following surgical cultures.  Awaiting culture sensitivities.  2/1-POD #2, I&D, Cx pending continue Unasyn

## 2024-02-01 NOTE — PROGRESS NOTES
"[facility]  Orthopedics  Progress Note    Patient Name: Jenniffer Jimenez  MRN: [id number]  Admission Date: 1/27/2024  Hospital Length of Stay: 5 days  Attending Provider: José Miguel Beverly MD  Primary Care Provider: Reed Ruiz MD  Follow-up For: Procedure(s) (LRB):  IRRIGATION AND DEBRIDEMENT, UPPER EXTREMITY (Right)    Post-Operative Day: 2 Days Post-Op  Subjective:     Principal Problem:Infected cat bite of hand, right, initial encounter    Principal Orthopedic Problem:  Abscess right hand    Interval History:  Postop day 2 status post I&D right hand  Feeling better less pain minimal drainage    Review of patient's allergies indicates:   Allergen Reactions    Opioids - morphine analogues Other (See Comments)     Bowel issues; bowel obstruction    Tizanidine Other (See Comments)     "Lips were numb,  Almost passed out."    Tramadol Hallucinations    Beta-blockers (beta-adrenergic blocking agts) Other (See Comments)     Can not go on beta blockers for long period of time - due to taking allergy injections    Morphine     Opioids-meperidine and related     Ciprofloxacin Rash       Current Facility-Administered Medications   Medication    acetaminophen tablet 650 mg    albuterol-ipratropium 2.5 mg-0.5 mg/3 mL nebulizer solution 3 mL    aluminum-magnesium hydroxide-simethicone 200-200-20 mg/5 mL suspension 30 mL    ampicillin-sulbactam (UNASYN) 3 g in sodium chloride 0.9 % 100 mL IVPB (MB+)    aspirin EC tablet 81 mg    enoxaparin injection 40 mg    ferrous sulfate tablet 1 each    HYDROmorphone (PF) injection 0.2 mg    ketorolac injection 15 mg    melatonin tablet 6 mg    ondansetron disintegrating tablet 8 mg    ondansetron injection 4 mg    ondansetron injection 4 mg    oxyCODONE immediate release tablet 5 mg    oxyCODONE-acetaminophen 5-325 mg per tablet 1 tablet    pravastatin tablet 40 mg    psyllium capsule 1.56 g    senna-docusate 8.6-50 mg per tablet 1 tablet    simethicone chewable tablet 80 mg    " "sodium chloride 0.9% flush 10 mL    spironolactone tablet 25 mg    vitamin E capsule 400 Units     Objective:     Vital Signs (Most Recent):  Temp: 97 °F (36.1 °C) (02/01/24 1126)  Pulse: 67 (02/01/24 1241)  Resp: 18 (02/01/24 1126)  BP: (!) 144/63 (02/01/24 1126)  SpO2: 96 % (02/01/24 1126) Vital Signs (24h Range):  Temp:  [97 °F (36.1 °C)-98.4 °F (36.9 °C)] 97 °F (36.1 °C)  Pulse:  [60-78] 67  Resp:  [16-18] 18  SpO2:  [94 %-97 %] 96 %  BP: (114-144)/(56-87) 144/63     Weight: 78.7 kg (173 lb 8 oz)  Height: 5' 5" (165.1 cm)  Body mass index is 28.87 kg/m².      Intake/Output Summary (Last 24 hours) at 2/1/2024 1529  Last data filed at 2/1/2024 0450  Gross per 24 hour   Intake 250 ml   Output 1000 ml   Net -750 ml       Ortho/SPM Exam right hand improving redness decreasing minimal drainage diffuse tenderness range of motion fingers slightly decreased    Significant Labs: Wound Culture: No results for input(s): "LABAERO" in the last 48 hours.  All pertinent labs within the past 24 hours have been reviewed.    Significant Imaging: I have reviewed and interpreted all pertinent imaging results/findings.    Assessment/Plan:     Active Diagnoses:    Diagnosis Date Noted POA    PRINCIPAL PROBLEM:  Infected cat bite of hand, right, initial encounter [S61.451A, L08.9, W55.01XA] 01/30/2024 Yes    Cat scratch [W55.03XA] 01/26/2024 Yes     Chronic    Heart failure with preserved ejection fraction [I50.30] 04/24/2023 Yes     Chronic    Overweight (BMI 25.0-29.9) [E66.3] 01/21/2019 Yes    Chronic a-fib [I48.20] 01/29/2016 Yes     Chronic    Primary hypertension [I10]  Yes     Chronic      Problems Resolved During this Admission:     Plan:  Patient should be okay for discharge tomorrow on oral antibiotics like Augmentin or clindamycin  Daily dressing to right hand: Home health orders placed  Follow-up with me in 7-10 days    Con Moran Jr, MD  Orthopedics  [place] - Telemetry  "

## 2024-02-02 LAB
BACTERIA BLD CULT: NORMAL
BACTERIA SPEC AEROBE CULT: ABNORMAL
BACTERIA SPEC ANAEROBE CULT: ABNORMAL

## 2024-02-02 PROCEDURE — 63600175 PHARM REV CODE 636 W HCPCS: Mod: HCNC | Performed by: HOSPITALIST

## 2024-02-02 PROCEDURE — 94761 N-INVAS EAR/PLS OXIMETRY MLT: CPT | Mod: HCNC

## 2024-02-02 PROCEDURE — 63700000 PHARM REV CODE 250 ALT 637 W/O HCPCS: Mod: HCNC | Performed by: ORTHOPAEDIC SURGERY

## 2024-02-02 PROCEDURE — A4216 STERILE WATER/SALINE, 10 ML: HCPCS | Mod: HCNC | Performed by: ORTHOPAEDIC SURGERY

## 2024-02-02 PROCEDURE — 11000001 HC ACUTE MED/SURG PRIVATE ROOM: Mod: HCNC

## 2024-02-02 PROCEDURE — 25000003 PHARM REV CODE 250: Mod: HCNC | Performed by: HOSPITALIST

## 2024-02-02 PROCEDURE — 25000003 PHARM REV CODE 250: Mod: HCNC | Performed by: ORTHOPAEDIC SURGERY

## 2024-02-02 PROCEDURE — 25000003 PHARM REV CODE 250: Mod: HCNC | Performed by: NURSE PRACTITIONER

## 2024-02-02 RX ADMIN — AMPICILLIN SODIUM AND SULBACTAM SODIUM 3 G: 2; 1 INJECTION, POWDER, FOR SOLUTION INTRAMUSCULAR; INTRAVENOUS at 12:02

## 2024-02-02 RX ADMIN — PRAVASTATIN SODIUM 40 MG: 40 TABLET ORAL at 08:02

## 2024-02-02 RX ADMIN — Medication 10 ML: at 10:02

## 2024-02-02 RX ADMIN — SIMETHICONE ANTIGAS 1.56 G: 125 TABLET, CHEWABLE ORAL at 04:02

## 2024-02-02 RX ADMIN — AMPICILLIN SODIUM AND SULBACTAM SODIUM 3 G: 2; 1 INJECTION, POWDER, FOR SOLUTION INTRAMUSCULAR; INTRAVENOUS at 04:02

## 2024-02-02 RX ADMIN — FERROUS SULFATE TAB 325 MG (65 MG ELEMENTAL FE) 1 EACH: 325 (65 FE) TAB at 09:02

## 2024-02-02 RX ADMIN — AMPICILLIN SODIUM AND SULBACTAM SODIUM 3 G: 2; 1 INJECTION, POWDER, FOR SOLUTION INTRAMUSCULAR; INTRAVENOUS at 09:02

## 2024-02-02 RX ADMIN — ASPIRIN 81 MG: 81 TABLET, COATED ORAL at 09:02

## 2024-02-02 RX ADMIN — Medication 10 ML: at 05:02

## 2024-02-02 RX ADMIN — SPIRONOLACTONE 25 MG: 25 TABLET, FILM COATED ORAL at 09:02

## 2024-02-02 RX ADMIN — Medication 10 ML: at 01:02

## 2024-02-02 RX ADMIN — SIMETHICONE ANTIGAS 1.56 G: 125 TABLET, CHEWABLE ORAL at 09:02

## 2024-02-02 RX ADMIN — ENOXAPARIN SODIUM 40 MG: 40 INJECTION SUBCUTANEOUS at 04:02

## 2024-02-02 RX ADMIN — Medication 400 UNITS: at 09:02

## 2024-02-02 NOTE — PLAN OF CARE
Not medically ready for discharge today, plan is when ready for discharge she will go home with family and  services, pt lives at [facility].  Future Appointments   Date Time Provider Department Center   2/7/2024 10:00 AM Afshan Schaeffer MD [facility] [place] [facility]   2/15/2024 10:00 AM Brent Chapman Jr., MD [facility] C[facility] [place]   2/21/2024  1:00 PM Reed Ruiz MD [facility] IM [place]   4/25/2024 10:45 AM Lennie Louis DPM [facility] P[facility] [address]      02/02/24 1321   Post-Acute Status   Post-Acute Authorization Home Health   Home Health Status Pending medical clearance/testing   Coverage [employer] Managed   Hospital Resources/Appts/Education Provided Appointments scheduled and added to AVS   Discharge Delays None known at this time   Discharge Plan   Discharge Plan A Home;Home with family;Home Health

## 2024-02-02 NOTE — ASSESSMENT & PLAN NOTE
Infected cat bite of hand, right, initial encounter      Patient noted with a cat scratch to her right hand--patient states the cat is her cat and has been for the last 14 years.  -she was seen outpatient and started on Augmentin---patient has failed outpatient therapy  -blood cultures and wound cultures pending  -will cont Unasyn while inpatient and follow cultures  -1/29:  It was reported to the staff that on yesterday evening the wound was noted to be swollen more so than on admission, nursing stated they placed warm compress to the right hand which helped pustule draining.  We will continue IV Unasyn---leukocytosis improved and patient has been afebrile.  Will ask hand surgery to evaluate.  Follow blood cultures.  -1/31:  Appreciate Dr. Moran--status post I&D to the right hand.  Following surgical cultures.  Awaiting culture sensitivities.  2/1-POD#2, I&D, Cx pending continue Unasyn

## 2024-02-02 NOTE — PROGRESS NOTES
[facility] Medicine  Progress Note    Patient Name: Jenniffer Jimenez  MRN: [id number]  Patient Class: IP- Inpatient   Admission Date: 1/27/2024  Length of Stay: 6 days  Attending Physician: Courtney Cook*  Primary Care Provider: Reed Ruiz MD        Subjective:     Principal Problem:Infected cat bite of hand, right, initial encounter        HPI:  Jenniffer Jimenez is a 89-year-old female with a past medical history of CKD, diabetes, TIA osteoarthritis presents with cat bite.    Patient states that about 4 or 5 days ago, patient was bit by her cat on her right hand.  She was seen by an outside provider and prescribed Augmentin which she started last night.  She has taken 2 doses so far but she has been complaining of worsening painful abscess to the palm site of her hand, which has significant drainage and swelling.  She presents for further care.    Triage vitals exam significant for tachypnea.  Review of systems is significant for tender hand with drainage.  CBC significant for normocytic anemia, thrombocytopenia.  CMP is significant for slightly increased sugar.    UA significant for elevated WBCs.  Urine culture is pending.    Blood cultures are pending.    X-ray of right hand showed no acute findings.    ED provider gave patient Tdap vaccine injection.  Patient was also started on Unasyn.    Patient admitted for hand infection.    Overview/Hospital Course:  [place] was [place] [facility] care. IV abx were ordered. Following blood cultures and urine cultures. Tdap was given in the ED. Will cont IV unasyn for now.   1/29-Urine cultures with no growth. Blood cultures with ngtd. Still following cultures from the hand. Will cont Unasyn for now for right hand animal scratch. Will ask hand sx to evaluate given the increased in swelling on yesterday.  1/30- Patient seen and examined today. She report feeling better today. Vital signs stable. Plan I & D of right hand today.    1/31-status post I&D of the right and postop day 1.  Following we will continue Unasyn---can deescalate once sensitivities have resulted.  2/1-POD #2 continue Unasyn, sensitivities remain pending    Interval History: awake and alert, feels better.   Status post I&D 1/ Aerobic culture with pasteurella multocida-presently on Unasyn-continue pending sensitivity.   Updated patient and daughter at the bedside, questions and concerns were addressed.   Possible discharge in 1-2 days    Review of Systems   Constitutional:  Negative for fatigue and fever.   HENT:          Hard of hearing   Respiratory:  Negative for cough and shortness of breath.    Cardiovascular:  Negative for chest pain.   Gastrointestinal:  Negative for diarrhea, nausea and vomiting.   Genitourinary:  Negative for frequency.   Musculoskeletal:  Negative for gait problem.   Skin:  Positive for wound (right hand).   Psychiatric/Behavioral:  Negative for confusion.      Objective:     Vital Signs (Most Recent):  Temp: 98.4 °F (36.9 °C) (02/02/24 0747)  Pulse: 77 (02/02/24 0747)  Resp: 18 (02/02/24 0747)  BP: (!) 144/73 (02/02/24 0747)  SpO2: (!) 94 % (02/02/24 0747) Vital Signs (24h Range):  Temp:  [97 °F (36.1 °C)-98.8 °F (37.1 °C)] 98.4 °F (36.9 °C)  Pulse:  [67-77] 77  Resp:  [18-20] 18  SpO2:  [93 %-97 %] 94 %  BP: (112-166)/(52-85) 144/73     Weight: 76.1 kg (167 lb 12.3 oz)  Body mass index is 27.92 kg/m².    Intake/Output Summary (Last 24 hours) at 2/2/2024 0800  Last data filed at 2/2/2024 0403  Gross per 24 hour   Intake 240 ml   Output --   Net 240 ml         Physical Exam  Vitals and nursing note reviewed.   Constitutional:       Appearance: Normal appearance.   HENT:      Head: Normocephalic and atraumatic.   Eyes:      Extraocular Movements: Extraocular movements intact.   Cardiovascular:      Rate and Rhythm: Normal rate and regular rhythm.      Pulses: Normal pulses.      Heart sounds: Normal heart sounds.   Pulmonary:      Effort: No  "respiratory distress.      Breath sounds: Normal breath sounds. No wheezing.   Abdominal:      General: Bowel sounds are normal. There is no distension.      Palpations: Abdomen is soft.      Tenderness: There is no abdominal tenderness. There is no guarding.   Musculoskeletal:         General: Swelling (improved swelling to the right hand) and tenderness (mild) present.      Cervical back: Normal range of motion and neck supple.   Skin:     General: Skin is warm and dry.      Capillary Refill: Capillary refill takes 2 to 3 seconds.      Comments: Wound dressing clean and dry,    Neurological:      Mental Status: She is oriented to person, place, and time.   Psychiatric:         Mood and Affect: Mood normal.         Behavior: Behavior normal.             Significant Labs: A1C: No results for input(s): "HGBA1C" in the last 4320 hours.  ABGs: No results for input(s): "PH", "PCO2", "HCO3", "POCSATURATED", "BE", "TOTALHB", "COHB", "METHB", "O2HB", "POCFIO2", "PO2" in the last 48 hours.  Blood Culture: No results for input(s): "LABBLOO" in the last 48 hours.  CBC: No results for input(s): "WBC", "HGB", "HCT", "PLT" in the last 48 hours.  CMP: No results for input(s): "NA", "K", "CL", "CO2", "GLU", "BUN", "CREATININE", "CALCIUM", "PROT", "ALBUMIN", "BILITOT", "ALKPHOS", "AST", "ALT", "ANIONGAP", "EGFRNONAA" in the last 48 hours.    Invalid input(s): "ESTGFAFRICA"  Lactic Acid: No results for input(s): "LACTATE" in the last 48 hours.  Lipase: No results for input(s): "LIPASE" in the last 48 hours.  Lipid Panel: No results for input(s): "CHOL", "HDL", "LDLCALC", "TRIG", "CHOLHDL" in the last 48 hours.  Magnesium: No results for input(s): "MG" in the last 48 hours.  Troponin: No results for input(s): "TROPONINI", "TROPONINIHS" in the last 48 hours.  TSH: No results for input(s): "TSH" in the last 4320 hours.  Urine Culture: No results for input(s): "LABURIN" in the last 48 hours.  Urine Studies: No results for input(s): " ""COLORU", "APPEARANCEUA", "PHUR", "SPECGRAV", "PROTEINUA", "GLUCUA", "KETONESU", "BILIRUBINUA", "OCCULTUA", "NITRITE", "UROBILINOGEN", "LEUKOCYTESUR", "RBCUA", "WBCUA", "BACTERIA", "SQUAMEPITHEL", "HYALINECASTS" in the last 48 hours.    Invalid input(s): "WRIGHTSUR"    Significant Imaging: I have reviewed all pertinent imaging results/findings within the past 24 hours.    Assessment/Plan:      * Infected cat bite of hand, right, initial encounter  Patient noted with a cat scratch to her right hand--patient states the cat is her cat and has been for the last 14 years.  -she was seen outpatient and started on Augmentin---patient has failed outpatient therapy  -blood cultures and wound cultures pending  -will cont Unasyn while inpatient and follow cultures  -1/29:  It was reported to the staff that on yesterday evening the wound was noted to be swollen more so than on admission, nursing stated they placed warm compress to the right hand which helped pustule draining.  We will continue IV Unasyn---leukocytosis improved and patient has been afebrile.  Will ask hand surgery to evaluate.  Follow blood cultures.  -1/31:  Appreciate Dr. Moran--status post I&D to the right hand.  Following surgical cultures.  Awaiting culture sensitivities.  2/1-POD #2, I&D, Cx pending continue Unasyn    Cat scratch  Infected cat bite of hand, right, initial encounter      Patient noted with a cat scratch to her right hand--patient states the cat is her cat and has been for the last 14 years.  -she was seen outpatient and started on Augmentin---patient has failed outpatient therapy  -blood cultures and wound cultures pending  -will cont Unasyn while inpatient and follow cultures  -1/29:  It was reported to the staff that on yesterday evening the wound was noted to be swollen more so than on admission, nursing stated they placed warm compress to the right hand which helped pustule draining.  We will continue IV Unasyn---leukocytosis improved " and patient has been afebrile.  Will ask hand surgery to evaluate.  Follow blood cultures.  -1/31:  Appreciate Dr. Moran--status post I&D to the right hand.  Following surgical cultures.  Awaiting culture sensitivities.  2/1-POD#2, I&D, Cx pending continue Unasyn      Heart failure with preserved ejection fraction  TTE--4/22/23:  The left ventricle is normal in size with concentric hypertrophy and normal systolic function.  The estimated ejection fraction is 55%.  Indeterminate left ventricular diastolic function.  With normal right ventricular systolic function.  Severe left atrial enlargement.  Severe right atrial enlargement.  Mild mitral regurgitation.  Mild to moderate tricuspid regurgitation.  There is pulmonary hypertension.  The estimated PA systolic pressure is 56 mmHg.  Elevated central venous pressure (15 mmHg).  stable      Overweight (BMI 25.0-29.9)  Body mass index is 28.87 kg/m². Morbid obesity complicates all aspects of disease management from diagnostic modalities to treatment. Weight loss encouraged and health benefits explained to patient.        Chronic a-fib  Hx watchman device  ASA 81 mg PO daily  Cardiac tele-rate controlled  Cont monitoring      Primary hypertension  Chronic, controlled. Latest blood pressure and vitals reviewed-     Temp:  [97 °F (36.1 °C)-98.4 °F (36.9 °C)]   Pulse:  [60-78]   Resp:  [16-18]   BP: (114-144)/(56-87)   SpO2:  [94 %-97 %] .   Home meds for hypertension were reviewed and noted below.   Hypertension Medications               furosemide (LASIX) 20 MG tablet Take 2 tablets in the a.m. for weights 155-159, take 2 tablets twice a day for weights 160 over, none for weights less than 155    spironolactone (ALDACTONE) 25 MG tablet Take 1 tablet (25 mg total) by mouth once daily.            While in the hospital, will manage blood pressure as follows; Continue home antihypertensive regimen    Will utilize p.r.n. blood pressure medication only if patient's blood  pressure greater than 180/110 and she develops symptoms such as worsening chest pain or shortness of breath.      VTE Risk Mitigation (From admission, onward)           Ordered     enoxaparin injection 40 mg  Every 24 hours         02/01/24 1224     IP VTE HIGH RISK PATIENT  Once         01/27/24 1328     Place sequential compression device  Until discontinued         01/27/24 1328                    Discharge Planning   GILES:      Code Status: Full Code   Is the patient medically ready for discharge?:     Reason for patient still in hospital (select all that apply): Patient trending condition  Discharge Plan A: Home, Home with family, Home Health   Discharge Delays: None known at this time              Courtney Cook MD  Department of Hospital Medicine   [place] [facility]

## 2024-02-02 NOTE — SUBJECTIVE & OBJECTIVE
Interval History: awake and alert, feels better.   Status post I&D 1/ Aerobic culture with pasteurella multocida-presently on Unasyn-continue pending sensitivity.   Updated patient and daughter at the bedside, questions and concerns were addressed.   Possible discharge in 1-2 days    Review of Systems   Constitutional:  Negative for fatigue and fever.   HENT:          Hard of hearing   Respiratory:  Negative for cough and shortness of breath.    Cardiovascular:  Negative for chest pain.   Gastrointestinal:  Negative for diarrhea, nausea and vomiting.   Genitourinary:  Negative for frequency.   Musculoskeletal:  Negative for gait problem.   Skin:  Positive for wound (right hand).   Psychiatric/Behavioral:  Negative for confusion.      Objective:     Vital Signs (Most Recent):  Temp: 98.4 °F (36.9 °C) (02/02/24 0747)  Pulse: 77 (02/02/24 0747)  Resp: 18 (02/02/24 0747)  BP: (!) 144/73 (02/02/24 0747)  SpO2: (!) 94 % (02/02/24 0747) Vital Signs (24h Range):  Temp:  [97 °F (36.1 °C)-98.8 °F (37.1 °C)] 98.4 °F (36.9 °C)  Pulse:  [67-77] 77  Resp:  [18-20] 18  SpO2:  [93 %-97 %] 94 %  BP: (112-166)/(52-85) 144/73     Weight: 76.1 kg (167 lb 12.3 oz)  Body mass index is 27.92 kg/m².    Intake/Output Summary (Last 24 hours) at 2/2/2024 0800  Last data filed at 2/2/2024 0403  Gross per 24 hour   Intake 240 ml   Output --   Net 240 ml         Physical Exam  Vitals and nursing note reviewed.   Constitutional:       Appearance: Normal appearance.   HENT:      Head: Normocephalic and atraumatic.   Eyes:      Extraocular Movements: Extraocular movements intact.   Cardiovascular:      Rate and Rhythm: Normal rate and regular rhythm.      Pulses: Normal pulses.      Heart sounds: Normal heart sounds.   Pulmonary:      Effort: No respiratory distress.      Breath sounds: Normal breath sounds. No wheezing.   Abdominal:      General: Bowel sounds are normal. There is no distension.      Palpations: Abdomen is soft.      Tenderness:  "There is no abdominal tenderness. There is no guarding.   Musculoskeletal:         General: Swelling (improved swelling to the right hand) and tenderness (mild) present.      Cervical back: Normal range of motion and neck supple.   Skin:     General: Skin is warm and dry.      Capillary Refill: Capillary refill takes 2 to 3 seconds.      Comments: Wound dressing clean and dry,    Neurological:      Mental Status: She is oriented to person, place, and time.   Psychiatric:         Mood and Affect: Mood normal.         Behavior: Behavior normal.             Significant Labs: A1C: No results for input(s): "HGBA1C" in the last 4320 hours.  ABGs: No results for input(s): "PH", "PCO2", "HCO3", "POCSATURATED", "BE", "TOTALHB", "COHB", "METHB", "O2HB", "POCFIO2", "PO2" in the last 48 hours.  Blood Culture: No results for input(s): "LABBLOO" in the last 48 hours.  CBC: No results for input(s): "WBC", "HGB", "HCT", "PLT" in the last 48 hours.  CMP: No results for input(s): "NA", "K", "CL", "CO2", "GLU", "BUN", "CREATININE", "CALCIUM", "PROT", "ALBUMIN", "BILITOT", "ALKPHOS", "AST", "ALT", "ANIONGAP", "EGFRNONAA" in the last 48 hours.    Invalid input(s): "ESTGFAFRICA"  Lactic Acid: No results for input(s): "LACTATE" in the last 48 hours.  Lipase: No results for input(s): "LIPASE" in the last 48 hours.  Lipid Panel: No results for input(s): "CHOL", "HDL", "LDLCALC", "TRIG", "CHOLHDL" in the last 48 hours.  Magnesium: No results for input(s): "MG" in the last 48 hours.  Troponin: No results for input(s): "TROPONINI", "TROPONINIHS" in the last 48 hours.  TSH: No results for input(s): "TSH" in the last 4320 hours.  Urine Culture: No results for input(s): "LABURIN" in the last 48 hours.  Urine Studies: No results for input(s): "COLORU", "APPEARANCEUA", "PHUR", "SPECGRAV", "PROTEINUA", "GLUCUA", "KETONESU", "BILIRUBINUA", "OCCULTUA", "NITRITE", "UROBILINOGEN", "LEUKOCYTESUR", "RBCUA", "WBCUA", "BACTERIA", "SQUAMEPITHEL", " ""HYALINECASTS" in the last 48 hours.    Invalid input(s): "WRIGHTSUR"    Significant Imaging: I have reviewed all pertinent imaging results/findings within the past 24 hours.  "

## 2024-02-03 LAB
ANION GAP SERPL CALC-SCNC: 10 MMOL/L (ref 8–16)
BASOPHILS # BLD AUTO: 0.04 K/UL (ref 0–0.2)
BASOPHILS NFR BLD: 1.1 % (ref 0–1.9)
BUN SERPL-MCNC: 32 MG/DL (ref 10–30)
CALCIUM SERPL-MCNC: 9.1 MG/DL (ref 8.7–10.5)
CHLORIDE SERPL-SCNC: 107 MMOL/L (ref 95–110)
CO2 SERPL-SCNC: 21 MMOL/L (ref 23–29)
CREAT SERPL-MCNC: 1.4 MG/DL (ref 0.5–1.4)
DIFFERENTIAL METHOD BLD: ABNORMAL
EOSINOPHIL # BLD AUTO: 0.1 K/UL (ref 0–0.5)
EOSINOPHIL NFR BLD: 2.5 % (ref 0–8)
ERYTHROCYTE [DISTWIDTH] IN BLOOD BY AUTOMATED COUNT: 16.2 % (ref 11.5–14.5)
EST. GFR  (NO RACE VARIABLE): 36 ML/MIN/1.73 M^2
GLUCOSE SERPL-MCNC: 168 MG/DL (ref 70–110)
HCT VFR BLD AUTO: 29.7 % (ref 37–48.5)
HGB BLD-MCNC: 10.2 G/DL (ref 12–16)
IMM GRANULOCYTES # BLD AUTO: 0.02 K/UL (ref 0–0.04)
IMM GRANULOCYTES NFR BLD AUTO: 0.5 % (ref 0–0.5)
LYMPHOCYTES # BLD AUTO: 0.9 K/UL (ref 1–4.8)
LYMPHOCYTES NFR BLD: 24 % (ref 18–48)
MAGNESIUM SERPL-MCNC: 2.1 MG/DL (ref 1.6–2.6)
MCH RBC QN AUTO: 30 PG (ref 27–31)
MCHC RBC AUTO-ENTMCNC: 34.3 G/DL (ref 32–36)
MCV RBC AUTO: 87 FL (ref 82–98)
MONOCYTES # BLD AUTO: 0.3 K/UL (ref 0.3–1)
MONOCYTES NFR BLD: 8.2 % (ref 4–15)
NEUTROPHILS # BLD AUTO: 2.3 K/UL (ref 1.8–7.7)
NEUTROPHILS NFR BLD: 63.7 % (ref 38–73)
NRBC BLD-RTO: 0 /100 WBC
PLATELET # BLD AUTO: 142 K/UL (ref 150–450)
PMV BLD AUTO: 10.4 FL (ref 9.2–12.9)
POTASSIUM SERPL-SCNC: 4.8 MMOL/L (ref 3.5–5.1)
RBC # BLD AUTO: 3.4 M/UL (ref 4–5.4)
SODIUM SERPL-SCNC: 138 MMOL/L (ref 136–145)
WBC # BLD AUTO: 3.66 K/UL (ref 3.9–12.7)

## 2024-02-03 PROCEDURE — 83735 ASSAY OF MAGNESIUM: CPT | Mod: HCNC | Performed by: NURSE PRACTITIONER

## 2024-02-03 PROCEDURE — 11000001 HC ACUTE MED/SURG PRIVATE ROOM: Mod: HCNC

## 2024-02-03 PROCEDURE — 63700000 PHARM REV CODE 250 ALT 637 W/O HCPCS: Mod: HCNC | Performed by: ORTHOPAEDIC SURGERY

## 2024-02-03 PROCEDURE — 25000003 PHARM REV CODE 250: Mod: HCNC | Performed by: NURSE PRACTITIONER

## 2024-02-03 PROCEDURE — 36415 COLL VENOUS BLD VENIPUNCTURE: CPT | Mod: HCNC | Performed by: NURSE PRACTITIONER

## 2024-02-03 PROCEDURE — 80048 BASIC METABOLIC PNL TOTAL CA: CPT | Mod: HCNC | Performed by: NURSE PRACTITIONER

## 2024-02-03 PROCEDURE — 85025 COMPLETE CBC W/AUTO DIFF WBC: CPT | Mod: HCNC | Performed by: NURSE PRACTITIONER

## 2024-02-03 PROCEDURE — 25000003 PHARM REV CODE 250: Mod: HCNC | Performed by: ORTHOPAEDIC SURGERY

## 2024-02-03 PROCEDURE — A4216 STERILE WATER/SALINE, 10 ML: HCPCS | Mod: HCNC | Performed by: ORTHOPAEDIC SURGERY

## 2024-02-03 PROCEDURE — 25000003 PHARM REV CODE 250: Mod: HCNC | Performed by: HOSPITALIST

## 2024-02-03 PROCEDURE — 94761 N-INVAS EAR/PLS OXIMETRY MLT: CPT | Mod: HCNC

## 2024-02-03 PROCEDURE — 63600175 PHARM REV CODE 636 W HCPCS: Mod: HCNC | Performed by: HOSPITALIST

## 2024-02-03 RX ADMIN — Medication 10 ML: at 09:02

## 2024-02-03 RX ADMIN — SIMETHICONE ANTIGAS 1.56 G: 125 TABLET, CHEWABLE ORAL at 09:02

## 2024-02-03 RX ADMIN — Medication 400 UNITS: at 09:02

## 2024-02-03 RX ADMIN — ASPIRIN 81 MG: 81 TABLET, COATED ORAL at 09:02

## 2024-02-03 RX ADMIN — AMPICILLIN SODIUM AND SULBACTAM SODIUM 3 G: 2; 1 INJECTION, POWDER, FOR SOLUTION INTRAMUSCULAR; INTRAVENOUS at 09:02

## 2024-02-03 RX ADMIN — SPIRONOLACTONE 25 MG: 25 TABLET, FILM COATED ORAL at 09:02

## 2024-02-03 RX ADMIN — ENOXAPARIN SODIUM 40 MG: 40 INJECTION SUBCUTANEOUS at 04:02

## 2024-02-03 RX ADMIN — AMPICILLIN SODIUM AND SULBACTAM SODIUM 3 G: 2; 1 INJECTION, POWDER, FOR SOLUTION INTRAMUSCULAR; INTRAVENOUS at 11:02

## 2024-02-03 RX ADMIN — AMPICILLIN SODIUM AND SULBACTAM SODIUM 3 G: 2; 1 INJECTION, POWDER, FOR SOLUTION INTRAMUSCULAR; INTRAVENOUS at 02:02

## 2024-02-03 RX ADMIN — Medication 10 ML: at 01:02

## 2024-02-03 RX ADMIN — SIMETHICONE ANTIGAS 1.56 G: 125 TABLET, CHEWABLE ORAL at 04:02

## 2024-02-03 RX ADMIN — PRAVASTATIN SODIUM 40 MG: 40 TABLET ORAL at 09:02

## 2024-02-03 RX ADMIN — Medication 10 ML: at 06:02

## 2024-02-03 RX ADMIN — FERROUS SULFATE TAB 325 MG (65 MG ELEMENTAL FE) 1 EACH: 325 (65 FE) TAB at 09:02

## 2024-02-03 NOTE — SUBJECTIVE & OBJECTIVE
Interval History: awake and alert, feels better.   Status post I&D 1/ Aerobic culture with pasteurella multocida-presently on Unasyn-continue pending sensitivity.   Updated patient at the bedside  Possible discharge in 1-2 days    Review of Systems   Constitutional:  Negative for fatigue and fever.   HENT:          Hard of hearing   Respiratory:  Negative for cough and shortness of breath.    Cardiovascular:  Negative for chest pain.   Gastrointestinal:  Negative for diarrhea, nausea and vomiting.   Genitourinary:  Negative for frequency.   Musculoskeletal:  Negative for gait problem.   Skin:  Positive for wound (right hand).   Psychiatric/Behavioral:  Negative for confusion.      Objective:     Vital Signs (Most Recent):  Temp: 97.6 °F (36.4 °C) (02/03/24 0724)  Pulse: 69 (02/03/24 0724)  Resp: 18 (02/03/24 0724)  BP: 118/62 (02/03/24 0724)  SpO2: 96 % (02/03/24 0803) Vital Signs (24h Range):  Temp:  [97.5 °F (36.4 °C)-98 °F (36.7 °C)] 97.6 °F (36.4 °C)  Pulse:  [59-79] 69  Resp:  [18-20] 18  SpO2:  [95 %-96 %] 96 %  BP: ()/(46-62) 118/62     Weight: 81 kg (178 lb 9.2 oz)  Body mass index is 29.72 kg/m².    Intake/Output Summary (Last 24 hours) at 2/3/2024 1043  Last data filed at 2/3/2024 0545  Gross per 24 hour   Intake 480 ml   Output --   Net 480 ml           Physical Exam  Vitals and nursing note reviewed.   Constitutional:       Appearance: Normal appearance.   HENT:      Head: Normocephalic and atraumatic.   Eyes:      Extraocular Movements: Extraocular movements intact.   Cardiovascular:      Rate and Rhythm: Normal rate and regular rhythm.      Pulses: Normal pulses.      Heart sounds: Normal heart sounds.   Pulmonary:      Effort: No respiratory distress.      Breath sounds: Normal breath sounds. No wheezing.   Abdominal:      General: Bowel sounds are normal. There is no distension.      Palpations: Abdomen is soft.      Tenderness: There is no abdominal tenderness. There is no guarding.  "  Musculoskeletal:         General: Swelling (improved swelling to the right hand) and tenderness (mild) present.      Cervical back: Normal range of motion and neck supple.   Skin:     General: Skin is warm and dry.      Capillary Refill: Capillary refill takes 2 to 3 seconds.      Comments: Wound dressing clean and dry,    Neurological:      Mental Status: She is oriented to person, place, and time.   Psychiatric:         Mood and Affect: Mood normal.         Behavior: Behavior normal.             Significant Labs: A1C: No results for input(s): "HGBA1C" in the last 4320 hours.  ABGs: No results for input(s): "PH", "PCO2", "HCO3", "POCSATURATED", "BE", "TOTALHB", "COHB", "METHB", "O2HB", "POCFIO2", "PO2" in the last 48 hours.  Blood Culture: No results for input(s): "LABBLOO" in the last 48 hours.  CBC:   Recent Labs   Lab 02/03/24  0807   WBC 3.66*   HGB 10.2*   HCT 29.7*   *     CMP:   Recent Labs   Lab 02/03/24  0807      K 4.8      CO2 21*   *   BUN 32*   CREATININE 1.4   CALCIUM 9.1   ANIONGAP 10     Lactic Acid: No results for input(s): "LACTATE" in the last 48 hours.  Lipase: No results for input(s): "LIPASE" in the last 48 hours.  Lipid Panel: No results for input(s): "CHOL", "HDL", "LDLCALC", "TRIG", "CHOLHDL" in the last 48 hours.  Magnesium:   Recent Labs   Lab 02/03/24  0807   MG 2.1     Troponin: No results for input(s): "TROPONINI", "TROPONINIHS" in the last 48 hours.  TSH: No results for input(s): "TSH" in the last 4320 hours.  Urine Culture: No results for input(s): "LABURIN" in the last 48 hours.  Urine Studies: No results for input(s): "COLORU", "APPEARANCEUA", "PHUR", "SPECGRAV", "PROTEINUA", "GLUCUA", "KETONESU", "BILIRUBINUA", "OCCULTUA", "NITRITE", "UROBILINOGEN", "LEUKOCYTESUR", "RBCUA", "WBCUA", "BACTERIA", "SQUAMEPITHEL", "HYALINECASTS" in the last 48 hours.    Invalid input(s): "WRIGHTSUR"    Significant Imaging: I have reviewed all pertinent imaging " results/findings within the past 24 hours.

## 2024-02-03 NOTE — ASSESSMENT & PLAN NOTE
Body mass index is 29.72 kg/m². Morbid obesity complicates all aspects of disease management from diagnostic modalities to treatment. Weight loss encouraged and health benefits explained to patient.

## 2024-02-03 NOTE — PLAN OF CARE
Problem: Adult Inpatient Plan of Care  Goal: Plan of Care Review  Outcome: Ongoing, Progressing  Goal: Optimal Comfort and Wellbeing  Outcome: Ongoing, Progressing     Problem: Infection  Goal: Absence of Infection Signs and Symptoms  Outcome: Ongoing, Progressing     Problem: UTI (Urinary Tract Infection)  Goal: Improved Infection Symptoms  Outcome: Ongoing, Progressing

## 2024-02-03 NOTE — ASSESSMENT & PLAN NOTE
Patient noted with a cat scratch to her right hand--patient states the cat is her cat and has been for the last 14 years.  -she was seen outpatient and started on Augmentin---patient has failed outpatient therapy  -blood cultures and wound cultures pending  -will cont Unasyn while inpatient and follow cultures  -1/29:  It was reported to the staff that on yesterday evening the wound was noted to be swollen more so than on admission, nursing stated they placed warm compress to the right hand which helped pustule draining.  We will continue IV Unasyn---leukocytosis improved and patient has been afebrile.  Will ask hand surgery to evaluate.  Follow blood cultures.  -1/31:  Appreciate Dr. Moran--status post I&D to the right hand.  Following surgical cultures.  Awaiting culture sensitivities.  2/1-POD #2, I&D, Cx pending continue Unasyn  2/3-POD #4, I&D, Cx pending continue Unasyn

## 2024-02-03 NOTE — PROGRESS NOTES
[facility] Medicine  Progress Note    Patient Name: Jenniffer Jimenez  MRN: [id number]  Patient Class: IP- Inpatient   Admission Date: 1/27/2024  Length of Stay: 7 days  Attending Physician: José Miguel Beverly MD  Primary Care Provider: Reed Ruiz MD        Subjective:     Principal Problem:Infected cat bite of hand, right, initial encounter        HPI:  Jenniffer Jimenez is a 89-year-old female with a past medical history of CKD, diabetes, TIA osteoarthritis presents with cat bite.    Patient states that about 4 or 5 days ago, patient was bit by her cat on her right hand.  She was seen by an outside provider and prescribed Augmentin which she started last night.  She has taken 2 doses so far but she has been complaining of worsening painful abscess to the palm site of her hand, which has significant drainage and swelling.  She presents for further care.    Triage vitals exam significant for tachypnea.  Review of systems is significant for tender hand with drainage.  CBC significant for normocytic anemia, thrombocytopenia.  CMP is significant for slightly increased sugar.    UA significant for elevated WBCs.  Urine culture is pending.    Blood cultures are pending.    X-ray of right hand showed no acute findings.    ED provider gave patient Tdap vaccine injection.  Patient was also started on Unasyn.    Patient admitted for hand infection.    Overview/Hospital Course:  She was admitted to the [facility] [place], [place]- service for further care. IV abx were ordered. Following blood cultures and urine cultures. Tdap was given in the ED. Will cont IV unasyn for now.   1/29-Urine cultures with no growth. Blood cultures with ngtd. Still following cultures from the hand. Will cont Unasyn for now for right hand animal scratch. Will ask hand sx to evaluate given the increased in swelling on yesterday.  1/30- Patient seen and examined today. She report feeling better today. Vital signs stable. Plan I & D of right hand today.    1/31-status post I&D of the right and postop day 1.  Following we will continue Unasyn---can deescalate once sensitivities have resulted.  2/1-POD #2 continue Unasyn, sensitivities remain pending  2/3-POD4, cont Unasyn--awaiting sensitivities     Interval History: awake and alert, feels better.   Status post I&D 1/ Aerobic culture with pasteurella multocida-presently on Unasyn-continue pending sensitivity.   Updated patient at the bedside  Possible discharge in 1-2 days    Review of Systems   Constitutional:  Negative for fatigue and fever.   HENT:          Hard of hearing   Respiratory:  Negative for cough and shortness of breath.    Cardiovascular:  Negative for chest pain.   Gastrointestinal:  Negative for diarrhea, nausea and vomiting.   Genitourinary:  Negative for frequency.   Musculoskeletal:  Negative for gait problem.   Skin:  Positive for wound (right hand).   Psychiatric/Behavioral:  Negative for confusion.      Objective:     Vital Signs (Most Recent):  Temp: 97.6 °F (36.4 °C) (02/03/24 0724)  Pulse: 69 (02/03/24 0724)  Resp: 18 (02/03/24 0724)  BP: 118/62 (02/03/24 0724)  SpO2: 96 % (02/03/24 0803) Vital Signs (24h Range):  Temp:  [97.5 °F (36.4 °C)-98 °F (36.7 °C)] 97.6 °F (36.4 °C)  Pulse:  [59-79] 69  Resp:  [18-20] 18  SpO2:  [95 %-96 %] 96 %  BP: ()/(46-62) 118/62     Weight: 81 kg (178 lb 9.2 oz)  Body mass index is 29.72 kg/m².    Intake/Output Summary (Last 24 hours) at 2/3/2024 1043  Last data filed at 2/3/2024 0545  Gross per 24 hour   Intake 480 ml   Output --   Net 480 ml           Physical Exam  Vitals and nursing note reviewed.   Constitutional:       Appearance: Normal appearance.   HENT:      Head: Normocephalic and atraumatic.   Eyes:      Extraocular Movements: Extraocular movements intact.   Cardiovascular:      Rate and Rhythm: Normal rate and regular rhythm.      Pulses: Normal pulses.      Heart sounds: Normal heart sounds.   Pulmonary:      Effort: No respiratory distress.  "     Breath sounds: Normal breath sounds. No wheezing.   Abdominal:      General: Bowel sounds are normal. There is no distension.      Palpations: Abdomen is soft.      Tenderness: There is no abdominal tenderness. There is no guarding.   Musculoskeletal:         General: Swelling (improved swelling to the right hand) and tenderness (mild) present.      Cervical back: Normal range of motion and neck supple.   Skin:     General: Skin is warm and dry.      Capillary Refill: Capillary refill takes 2 to 3 seconds.      Comments: Wound dressing clean and dry,    Neurological:      Mental Status: She is oriented to person, place, and time.   Psychiatric:         Mood and Affect: Mood normal.         Behavior: Behavior normal.             Significant Labs: A1C: No results for input(s): "HGBA1C" in the last 4320 hours.  ABGs: No results for input(s): "PH", "PCO2", "HCO3", "POCSATURATED", "BE", "TOTALHB", "COHB", "METHB", "O2HB", "POCFIO2", "PO2" in the last 48 hours.  Blood Culture: No results for input(s): "LABBLOO" in the last 48 hours.  CBC:   Recent Labs   Lab 02/03/24  0807   WBC 3.66*   HGB 10.2*   HCT 29.7*   *     CMP:   Recent Labs   Lab 02/03/24  0807      K 4.8      CO2 21*   *   BUN 32*   CREATININE 1.4   CALCIUM 9.1   ANIONGAP 10     Lactic Acid: No results for input(s): "LACTATE" in the last 48 hours.  Lipase: No results for input(s): "LIPASE" in the last 48 hours.  Lipid Panel: No results for input(s): "CHOL", "HDL", "LDLCALC", "TRIG", "CHOLHDL" in the last 48 hours.  Magnesium:   Recent Labs   Lab 02/03/24  0807   MG 2.1     Troponin: No results for input(s): "TROPONINI", "TROPONINIHS" in the last 48 hours.  TSH: No results for input(s): "TSH" in the last 4320 hours.  Urine Culture: No results for input(s): "LABURIN" in the last 48 hours.  Urine Studies: No results for input(s): "COLORU", "APPEARANCEUA", "PHUR", "SPECGRAV", "PROTEINUA", "GLUCUA", "KETONESU", "BILIRUBINUA", " ""OCCULTUA", "NITRITE", "UROBILINOGEN", "LEUKOCYTESUR", "RBCUA", "WBCUA", "BACTERIA", "SQUAMEPITHEL", "HYALINECASTS" in the last 48 hours.    Invalid input(s): "WRIGHTSUR"    Significant Imaging: I have reviewed all pertinent imaging results/findings within the past 24 hours.    Assessment/Plan:      * Infected cat bite of hand, right, initial encounter  Patient noted with a cat scratch to her right hand--patient states the cat is her cat and has been for the last 14 years.  -she was seen outpatient and started on Augmentin---patient has failed outpatient therapy  -blood cultures and wound cultures pending  -will cont Unasyn while inpatient and follow cultures  -1/29:  It was reported to the staff that on yesterday evening the wound was noted to be swollen more so than on admission, nursing stated they placed warm compress to the right hand which helped pustule draining.  We will continue IV Unasyn---leukocytosis improved and patient has been afebrile.  Will ask hand surgery to evaluate.  Follow blood cultures.  -1/31:  Appreciate Dr. Moran--status post I&D to the right hand.  Following surgical cultures.  Awaiting culture sensitivities.  2/1-POD #2, I&D, Cx pending continue Unasyn  2/3-POD #4, I&D, Cx pending continue Unasyn    Cat scratch  Infected cat bite of hand, right, initial encounter      Patient noted with a cat scratch to her right hand--patient states the cat is her cat and has been for the last 14 years.  -she was seen outpatient and started on Augmentin---patient has failed outpatient therapy  -blood cultures and wound cultures pending  -will cont Unasyn while inpatient and follow cultures  -1/29:  It was reported to the staff that on yesterday evening the wound was noted to be swollen more so than on admission, nursing stated they placed warm compress to the right hand which helped pustule draining.  We will continue IV Unasyn---leukocytosis improved and patient has been afebrile.  Will ask hand " surgery to evaluate.  Follow blood cultures.  -1/31:  Appreciate Dr. Moran--status post I&D to the right hand.  Following surgical cultures.  Awaiting culture sensitivities.  2/1-POD#2, I&D, Cx pending continue Unasyn  2/3-POD4 I&D-cont abx      Heart failure with preserved ejection fraction  TTE--4/22/23:  The left ventricle is normal in size with concentric hypertrophy and normal systolic function.  The estimated ejection fraction is 55%.  Indeterminate left ventricular diastolic function.  With normal right ventricular systolic function.  Severe left atrial enlargement.  Severe right atrial enlargement.  Mild mitral regurgitation.  Mild to moderate tricuspid regurgitation.  There is pulmonary hypertension.  The estimated PA systolic pressure is 56 mmHg.  Elevated central venous pressure (15 mmHg).  stable      Overweight (BMI 25.0-29.9)  Body mass index is 29.72 kg/m². Morbid obesity complicates all aspects of disease management from diagnostic modalities to treatment. Weight loss encouraged and health benefits explained to patient.        Chronic a-fib  Hx watchman device  ASA 81 mg PO daily  Cardiac tele-rate controlled  Cont monitoring      Primary hypertension  Chronic, controlled. Latest blood pressure and vitals reviewed-     Temp:  [97.5 °F (36.4 °C)-98 °F (36.7 °C)]   Pulse:  [59-79]   Resp:  [18-20]   BP: ()/(46-62)   SpO2:  [95 %-96 %] .   Home meds for hypertension were reviewed and noted below.   Hypertension Medications               furosemide (LASIX) 20 MG tablet Take 2 tablets in the a.m. for weights 155-159, take 2 tablets twice a day for weights 160 over, none for weights less than 155    spironolactone (ALDACTONE) 25 MG tablet Take 1 tablet (25 mg total) by mouth once daily.            While in the hospital, will manage blood pressure as follows; Continue home antihypertensive regimen    Will utilize p.r.n. blood pressure medication only if patient's blood pressure greater than 180/110  and she develops symptoms such as worsening chest pain or shortness of breath.      VTE Risk Mitigation (From admission, onward)           Ordered     enoxaparin injection 40 mg  Every 24 hours         02/01/24 1224     IP VTE HIGH RISK PATIENT  Once         01/27/24 1328     Place sequential compression device  Until discontinued         01/27/24 1328                    Discharge Planning   GILES:      Code Status: Full Code   Is the patient medically ready for discharge?:     Reason for patient still in hospital (select all that apply): Laboratory test, Treatment, Imaging, and Consult recommendations  Discharge Plan A: Home, Home with family, Home Health   Discharge Delays: None known at this time              Pito Shaw NP  Department of Hospital Medicine   [facility]

## 2024-02-03 NOTE — ASSESSMENT & PLAN NOTE
Chronic, controlled. Latest blood pressure and vitals reviewed-     Temp:  [97.5 °F (36.4 °C)-98 °F (36.7 °C)]   Pulse:  [59-79]   Resp:  [18-20]   BP: ()/(46-62)   SpO2:  [95 %-96 %] .   Home meds for hypertension were reviewed and noted below.   Hypertension Medications               furosemide (LASIX) 20 MG tablet Take 2 tablets in the a.m. for weights 155-159, take 2 tablets twice a day for weights 160 over, none for weights less than 155    spironolactone (ALDACTONE) 25 MG tablet Take 1 tablet (25 mg total) by mouth once daily.            While in the hospital, will manage blood pressure as follows; Continue home antihypertensive regimen    Will utilize p.r.n. blood pressure medication only if patient's blood pressure greater than 180/110 and she develops symptoms such as worsening chest pain or shortness of breath.

## 2024-02-03 NOTE — PROGRESS NOTES
"[facility]  Orthopedics  Progress Note    Patient Name: Jenniffer Jimenez  MRN: [id number]  Admission Date: 1/27/2024  Hospital Length of Stay: 7 days  Attending Provider: José Miguel Beverly MD  Primary Care Provider: Reed Ruiz MD  Follow-up For: Procedure(s) (LRB):  IRRIGATION AND DEBRIDEMENT, UPPER EXTREMITY (Right)    Post-Operative Day: 4 Days Post-Op  Subjective:     Principal Problem:Infected cat bite of hand, right, initial encounter    Principal Orthopedic Problem:  Abscess right hand    Interval History:  Doing well  Right hand less painful swelling down       Review of patient's allergies indicates:   Allergen Reactions    Opioids - morphine analogues Other (See Comments)     Bowel issues; bowel obstruction    Tizanidine Other (See Comments)     "Lips were numb,  Almost passed out."    Tramadol Hallucinations    Beta-blockers (beta-adrenergic blocking agts) Other (See Comments)     Can not go on beta blockers for long period of time - due to taking allergy injections    Morphine     Opioids-meperidine and related     Ciprofloxacin Rash       Current Facility-Administered Medications   Medication    acetaminophen tablet 650 mg    albuterol-ipratropium 2.5 mg-0.5 mg/3 mL nebulizer solution 3 mL    aluminum-magnesium hydroxide-simethicone 200-200-20 mg/5 mL suspension 30 mL    ampicillin-sulbactam (UNASYN) 3 g in sodium chloride 0.9 % 100 mL IVPB (MB+)    aspirin EC tablet 81 mg    enoxaparin injection 40 mg    ferrous sulfate tablet 1 each    HYDROmorphone (PF) injection 0.2 mg    melatonin tablet 6 mg    ondansetron disintegrating tablet 8 mg    ondansetron injection 4 mg    ondansetron injection 4 mg    oxyCODONE immediate release tablet 5 mg    oxyCODONE-acetaminophen 5-325 mg per tablet 1 tablet    pravastatin tablet 40 mg    psyllium capsule 1.56 g    senna-docusate 8.6-50 mg per tablet 1 tablet    simethicone chewable tablet 80 mg    sodium chloride 0.9% flush 10 mL    spironolactone tablet 25 " "mg    vitamin E capsule 400 Units     Objective:     Vital Signs (Most Recent):  Temp: 97.6 °F (36.4 °C) (02/03/24 1122)  Pulse: 67 (02/03/24 1230)  Resp: 18 (02/03/24 1122)  BP: (!) 113/56 (02/03/24 1122)  SpO2: 97 % (02/03/24 1122) Vital Signs (24h Range):  Temp:  [97.5 °F (36.4 °C)-98 °F (36.7 °C)] 97.6 °F (36.4 °C)  Pulse:  [59-79] 67  Resp:  [18-20] 18  SpO2:  [95 %-97 %] 97 %  BP: ()/(46-62) 113/56     Weight: 81 kg (178 lb 9.2 oz)  Height: 5' 5" (165.1 cm)  Body mass index is 29.72 kg/m².      Intake/Output Summary (Last 24 hours) at 2/3/2024 1413  Last data filed at 2/3/2024 0545  Gross per 24 hour   Intake 480 ml   Output --   Net 480 ml       Ortho/SPM Exam wounds are healing well minimal redness no drainage decreased swelling   Range of motion fingers improved    Significant Labs: All pertinent labs within the past 24 hours have been reviewed.    Significant Imaging: I have reviewed and interpreted all pertinent imaging results/findings.    Assessment/Plan:     Active Diagnoses:    Diagnosis Date Noted POA    PRINCIPAL PROBLEM:  Infected cat bite of hand, right, initial encounter [S61.451A, L08.9, W55.01XA] 01/30/2024 Yes    Cat scratch [W55.03XA] 01/26/2024 Yes     Chronic    Heart failure with preserved ejection fraction [I50.30] 04/24/2023 Yes     Chronic    Overweight (BMI 25.0-29.9) [E66.3] 01/21/2019 Yes    Chronic a-fib [I48.20] 01/29/2016 Yes     Chronic    Primary hypertension [I10]  Yes     Chronic      Problems Resolved During this Admission:     Plan:  Okay for discharge on oral Augmentin   Follow-up 1-2 weeks    Con Moran Jr, MD  Orthopedics  [place] - Telemetry  "

## 2024-02-03 NOTE — ASSESSMENT & PLAN NOTE
Infected cat bite of hand, right, initial encounter      Patient noted with a cat scratch to her right hand--patient states the cat is her cat and has been for the last 14 years.  -she was seen outpatient and started on Augmentin---patient has failed outpatient therapy  -blood cultures and wound cultures pending  -will cont Unasyn while inpatient and follow cultures  -1/29:  It was reported to the staff that on yesterday evening the wound was noted to be swollen more so than on admission, nursing stated they placed warm compress to the right hand which helped pustule draining.  We will continue IV Unasyn---leukocytosis improved and patient has been afebrile.  Will ask hand surgery to evaluate.  Follow blood cultures.  -1/31:  Appreciate Dr. Moran--status post I&D to the right hand.  Following surgical cultures.  Awaiting culture sensitivities.  2/1-POD#2, I&D, Cx pending continue Unasyn  2/3-POD4 I&D-cont abx

## 2024-02-04 VITALS
TEMPERATURE: 98 F | WEIGHT: 178.56 LBS | SYSTOLIC BLOOD PRESSURE: 106 MMHG | OXYGEN SATURATION: 95 % | RESPIRATION RATE: 18 BRPM | HEIGHT: 65 IN | DIASTOLIC BLOOD PRESSURE: 46 MMHG | HEART RATE: 72 BPM | BODY MASS INDEX: 29.75 KG/M2

## 2024-02-04 PROCEDURE — 63600175 PHARM REV CODE 636 W HCPCS: Mod: HCNC | Performed by: HOSPITALIST

## 2024-02-04 PROCEDURE — 25000003 PHARM REV CODE 250: Mod: HCNC | Performed by: NURSE PRACTITIONER

## 2024-02-04 PROCEDURE — 25000003 PHARM REV CODE 250: Mod: HCNC | Performed by: ORTHOPAEDIC SURGERY

## 2024-02-04 PROCEDURE — 94761 N-INVAS EAR/PLS OXIMETRY MLT: CPT | Mod: HCNC

## 2024-02-04 PROCEDURE — 63700000 PHARM REV CODE 250 ALT 637 W/O HCPCS: Mod: HCNC | Performed by: ORTHOPAEDIC SURGERY

## 2024-02-04 PROCEDURE — A4216 STERILE WATER/SALINE, 10 ML: HCPCS | Mod: HCNC | Performed by: ORTHOPAEDIC SURGERY

## 2024-02-04 PROCEDURE — 25000003 PHARM REV CODE 250: Mod: HCNC | Performed by: HOSPITALIST

## 2024-02-04 RX ORDER — AMOXICILLIN AND CLAVULANATE POTASSIUM 875; 125 MG/1; MG/1
1 TABLET, FILM COATED ORAL 2 TIMES DAILY
Qty: 28 TABLET | Refills: 0 | Status: SHIPPED | OUTPATIENT
Start: 2024-02-04 | End: 2024-02-18

## 2024-02-04 RX ADMIN — AMPICILLIN SODIUM AND SULBACTAM SODIUM 3 G: 2; 1 INJECTION, POWDER, FOR SOLUTION INTRAMUSCULAR; INTRAVENOUS at 11:02

## 2024-02-04 RX ADMIN — Medication 400 UNITS: at 09:02

## 2024-02-04 RX ADMIN — ASPIRIN 81 MG: 81 TABLET, COATED ORAL at 09:02

## 2024-02-04 RX ADMIN — Medication 10 ML: at 05:02

## 2024-02-04 RX ADMIN — FERROUS SULFATE TAB 325 MG (65 MG ELEMENTAL FE) 1 EACH: 325 (65 FE) TAB at 09:02

## 2024-02-04 RX ADMIN — AMPICILLIN SODIUM AND SULBACTAM SODIUM 3 G: 2; 1 INJECTION, POWDER, FOR SOLUTION INTRAMUSCULAR; INTRAVENOUS at 05:02

## 2024-02-04 RX ADMIN — SPIRONOLACTONE 25 MG: 25 TABLET, FILM COATED ORAL at 09:02

## 2024-02-04 NOTE — PLAN OF CARE
[facility]  [address]  [facility] [address]  Phone: [phone]    Home Health Orders  Face to Face Encounter    Patient Name: Jenniffer Jimenez  YOB: 1932    PCP: Reed Ruiz MD   PCP Address: [address] [place] [address]  PCP Phone Number: [phone]  PCP Fax: [phone]    Encounter Date: 02/04/2024    Admit To Home Health    Diagnoses:  Active Hospital Problems    Diagnosis  POA    *Infected cat bite of hand, right, initial encounter [S61.451A, L08.9, W55.01XA]  Yes    Cat scratch [W55.03XA]  Yes     Chronic    Heart failure with preserved ejection fraction [I50.30]  Yes     Chronic    Overweight (BMI 25.0-29.9) [E66.3]  Yes    Chronic a-fib [I48.20]  Yes     Chronic    Primary hypertension [I10]  Yes     Chronic      Resolved Hospital Problems   No resolved problems to display.       Future Appointments   Date Time Provider Department Center   2/7/2024 10:00 AM Afshan Schaeffer MD [facility] IMPRI [place] [facility]   2/15/2024 10:00 AM Brent Chapman Jr., MD [facility] CARDIO [place]   2/21/2024  1:00 PM Reed Ruiz MD [facility]   4/25/2024 10:45 AM Lennie Louis DPM [facility] P[facility] [place]      Follow-up Information       Afshan Schaeffer MD Follow up on 2/7/2024.    Specialty: Internal Medicine  Why: Appointment scheduled for 10 AM  Contact information:  [address]  [facility] [address]  [phone]               Brent Chapman Jr., MD Follow up on 2/15/2024.    Specialties: Interventional Cardiology, Cardiology  Why: A[ppointment scheduled for 10 AM  Contact information:  [address]  [address]  [place] [address]  [phone]               Reed Ruiz MD Follow up on 2/21/2024.    Specialty: Internal Medicine  Why: Appointment scheduledfor 1: 00 PM  Contact information:  [address] [place] [address]  [phone]               Con Moran Jr., MD. Schedule an appointment as soon as  "possible for a visit.    Specialties: Hand Surgery, Orthopedic Surgery  Why: For wound re-check  Contact information:  [address]  [address]  [place] [address]  [phone]               [facility] Follow up.    Specialties: Home Health Services, Home Therapy Services, Home Living Aide Services  Contact information:  [address]  [facility] [address]  [phone]                             I have seen and examined this patient face to face today. My clinical findings that support the need for the home health skilled services and home bound status are the following:  Weakness/numbness causing balance and gait disturbance due to Infection and Weakness/Debility making it taxing to leave home.  Requiring assistive device to leave home due to unsteady gait caused by  Infection and Weakness/Debility.  Patient with medication mismanagement issues requiring home bound status as evidenced by  Poor understanding of medication regimen/dosage and dressing changes.      Allergies:  Review of patient's allergies indicates:   Allergen Reactions    Opioids - morphine analogues Other (See Comments)     Bowel issues; bowel obstruction    Tizanidine Other (See Comments)     "Lips were numb,  Almost passed out."    Tramadol Hallucinations    Beta-blockers (beta-adrenergic blocking agts) Other (See Comments)     Can not go on beta blockers for long period of time - due to taking allergy injections    Morphine     Opioids-meperidine and related     Ciprofloxacin Rash         Code Status:    Code Status: Full Code      Diet: regular diet low sodium 2 gm      Referrals/ Consults     Physical Therapy to evaluate and treat     Occupational Therapy to evaluate and treat     Nutrition to evaluate and recommend diet      to evaluate for community resources/long-range planning     Aide to provide assistance with personal care, ADLs, and vital signs      Activities:   activity " as tolerated      Wound Care Orders:  yes:  right hand: Clean with peroxide and use kerlix wrap      Nursing:   Agency to admit patient within 24 hours of hospital discharge unless specified on physician order or at patient request    SN to complete comprehensive assessment including routine vital signs. Instruct on disease process and s/s of complications to report to MD. Review/verify medication list sent home with the patient at time of discharge  and instruct patient/caregiver as needed. Frequency may be adjusted depending on start of care date.     Skilled nurse to perform up to 3 visits PRN for symptoms related to diagnosis    Notify MD if SBP > 160 or < 90; DBP > 90 or < 50; HR > 120 or < 50; Temp > 101; O2 < 88%    Ok to schedule additional visits based on staff availability and patient request on consecutive days within the home health episode.      When multiple disciplines ordered:    Start of Care occurs on Sunday - Wednesday schedule remaining discipline evaluations as ordered on separate consecutive days following the start of care.    Thursday SOC -schedule subsequent evaluations Friday and Monday the following week.     Friday - Saturday SOC - schedule subsequent discipline evaluations on consecutive days starting Monday of the following week.      For all post-discharge communication and subsequent orders please contact patient's primary care physician. If unable to reach primary care physician or do not receive response within 30 minutes, please contact [facility] on call for clinical staff order clarification      Medications: Review discharge medications with patient and family and provide education.      Current Discharge Medication List        CONTINUE these medications which have CHANGED    Details   amoxicillin-clavulanate 875-125mg (AUGMENTIN) 875-125 mg per tablet Take 1 tablet by mouth 2 (two) times daily. for 14 days  Qty: 28 tablet, Refills: 0    Comments: Please fill as soon as possible.  Pt travelling by uber.  Associated Diagnoses: Cat bite, initial encounter           CONTINUE these medications which have NOT CHANGED    Details   acetaminophen (TYLENOL) 500 MG tablet Take 1,000 mg by mouth 2 (two) times a day.      albuterol-ipratropium (DUO-NEB) 2.5 mg-0.5 mg/3 mL nebulizer solution Take 3 mLs by nebulization every 6 (six) hours as needed for Wheezing or Shortness of Breath. Rescue  Qty: 75 mL, Refills: 0      aspirin (ECOTRIN) 81 MG EC tablet Take 81 mg by mouth once daily.      clotrimazole-betamethasone 1-0.05% (LOTRISONE) cream Apply topically 2 (two) times daily.  Qty: 45 g, Refills: 1    Associated Diagnoses: Tinea      diclofenac sodium (VOLTAREN) 1 % Gel Apply 2 g topically 4 (four) times daily. Apply to right hip      ferrous sulfate 325 (65 FE) MG EC tablet Take 1 tablet (325 mg total) by mouth once daily.      furosemide (LASIX) 20 MG tablet Take 2 tablets in the a.m. for weights 155-159, take 2 tablets twice a day for weights 160 over, none for weights less than 155  Qty: 120 tablet, Refills: 11    Associated Diagnoses: Acute heart failure with preserved ejection fraction      miconazole (MICOTIN) 2 % cream Per instructions 2 TIMES DAILY (route: topical)      miconazole nitrate 2% (MICOTIN) 2 % Oint Apply topically 2 (two) times daily. Coloplast Clear Antifungal ointment- (green top)- nursing to apply to perineum, inner thighs, pannus, and buttocks BID  Qty: 141 g, Refills: 0      pravastatin (PRAVACHOL) 40 MG tablet Take 1 tablet (40 mg total) by mouth once daily.  Qty: 90 tablet, Refills: 3    Associated Diagnoses: Pure hypercholesterolemia      propylene glycol (SYSTANE COMPLETE OPHT) Apply to eye.      psyllium 0.52 gram capsule Take 3 capsules by mouth 3 (three) times daily as needed (constipation).      silver sulfADIAZINE 1% (SILVADENE) 1 % cream Apply to RLE skin breakdown twice daily      spironolactone (ALDACTONE) 25 MG tablet Take 1 tablet (25 mg total) by mouth once  daily.  Qty: 90 tablet, Refills: 3    Comments: .  Associated Diagnoses: Chronic diastolic heart failure; Essential hypertension      triamcinolone acetonide 0.1% (KENALOG) 0.1 % ointment Apply topically 2 (two) times daily. Use to affected areas for up to 2 weeks then take a 1 week break or decrease to 3 times weekly. Do not apply to groin or face. Apply to red scaly areas on the legs and arms  Qty: 454 g, Refills: 1    Associated Diagnoses: Venous stasis dermatitis of both lower extremities      vitamin E 400 UNIT capsule Take 400 Units by mouth once daily.      COMIRNATY 2023-24, 12Y UP,,PF, 30 mcg/0.3 mL inection       FLUAD QUAD 2023-24,65Y UP,,PF, 60 mcg (15 mcg x 4)/0.5 mL Syrg            STOP taking these medications       flucytosine (ANCOBON) 500 mg Cap Comments:   Reason for Stopping:               I certify that this patient is confined to her home and needs intermittent skilled nursing care, physical therapy and occupational therapy.    Rani Simon, NP  [facility] Medicine

## 2024-02-04 NOTE — PLAN OF CARE
Discharge orders noted. AVS prepared with medication list, importance of medication compliance, follow up appointments, diet, home care instructions, treatment plan, self management, and when to seek medical attention. Detailed clinical reference list attached. AVS printed and handed to patient by bedside nurse. VN reviewed discharge instructions with patient using teachback method.  Allowed time for questions, all questions answered.  Patient verbalized complete understanding of discharge instructions and voices no concerns.      Discharge instructions complete.  Transport wheelchair requested.  Bedside nurse notified.

## 2024-02-04 NOTE — PLAN OF CARE
Problem: Adult Inpatient Plan of Care  Goal: Plan of Care Review  Outcome: Ongoing, Progressing  Goal: Optimal Comfort and Wellbeing  Outcome: Ongoing, Progressing     Problem: Fall Injury Risk  Goal: Absence of Fall and Fall-Related Injury  Outcome: Ongoing, Progressing     Problem: Infection  Goal: Absence of Infection Signs and Symptoms  Outcome: Ongoing, Progressing     Problem: Hypertension Comorbidity  Goal: Blood Pressure in Desired Range  Outcome: Ongoing, Progressing

## 2024-02-04 NOTE — PLAN OF CARE
"[facility] - Telemetry  Discharge Final Note    Primary Care Provider: Reed Ruiz MD    Expected Discharge Date: 2/4/2024    Pharmacist will go over home medications and reasons for medications. VN and bedside nurse to reiterate final discharge instructions.     Cleared from CM . Bedside Nurse and VN notified.    [place] to  at [place]. HH at [place] with [facility]. Orders sent via [facility]port.    Final Discharge Note (most recent)       Final Note - 02/04/24 1146          Final Note    Assessment Type Final Discharge Note (P)      Anticipated Discharge Disposition Home-Health Care Svc (P)      Hospital Resources/Appts/Education Provided Appointments scheduled and added to AVS;Post-Acute resouces added to AVS (P)         Post-Acute Status    Post-Acute Authorization Home Health;IV Infusion (P)      Home Health Status Set-up Complete/Auth obtained (P)    HH orders sent via Careport to [facility]. Original SOC 2/2    IV Infusion Status Patient declined/refused (P)    Pt to DC with PO AB[facility] at [place].    Discharge Delays Personal Transportation (P)    Family to  at [place].                  Future Appointments   Date Time Provider Department Center   2/7/2024 10:00 AM Afshan Schaeffer MD [facility] IMPRI [place] [facility]   2/15/2024 10:00 AM Brent Chapman Jr., MD [facility] CARDIO [place]   2/21/2024  1:00 PM Reed Ruiz MD [facility] IM [place]   4/25/2024 10:45 AM Lennie Louis DPM [facility] P[facility] [address]     BP (!) 106/46 (BP Location: Left arm, Patient Position: Lying)   Pulse 60   Temp 97.6 °F (36.4 °C) (Oral)   Resp 18   Ht 5' 5" (1.651 m)   Wt 81 kg (178 lb 9.2 oz)   LMP  (LMP Unknown)   SpO2 95%   Breastfeeding No   BMI 29.72 kg/m²       Contact Info       Afshan Schaeffer MD   Specialty: Internal Medicine    [address]  [address]  [place] [address]   Phone: [phone]       Next Steps: Follow up on 2/7/2024    Instructions: Appointment scheduled for 10 AM    Brent Chapman Jr., MD   Specialty: " Interventional Cardiology, Cardiology   Relationship: Consulting Physician    [address]  [address]  [place] [address]   Phone: [phone]       Next Steps: Follow up on 2/15/2024    Instructions: A[ppointment scheduled for 10 AM    Reed Ruiz MD   Specialty: Internal Medicine   Relationship: PCP - General    [address]  [place] [address]   Phone: [phone]       Next Steps: Follow up on 2/21/2024    Instructions: Appointment scheduledfor 1: 00 PM    Con Moran Jr., MD   Specialty: Hand Surgery, Orthopedic Surgery    [address]  [address]  [facility] [address]   Phone: [phone]       Next Steps: Schedule an appointment as soon as possible for a visit    Instructions: For wound re-check    [facility]   Specialty: Home Health Services, Home Therapy Services, Home Living Aide Services    [address]  [place] [address]   Phone: [phone]       Next Steps: Follow up              Orders Placed This Encounter   Procedures    Ambulatory referral/consult to Home Health     Standing Status:   Future     Standing Expiration Date:   3/1/2025     Referral Priority:   Routine     Referral Type:   Home Health     Referral Reason:   Specialty Services Required     Requested Specialty:   Home Health Services     Number of Visits Requested:   1

## 2024-02-04 NOTE — DISCHARGE SUMMARY
[facility] Medicine  Discharge Summary      Patient Name: Jenniffer Jimenez  MRN: [id number]  NANCY: [id number]  Patient Class: IP- Inpatient  Admission Date: 1/27/2024  Hospital Length of Stay: 8 days  Discharge Date and Time:  02/04/2024 11:38 AM  Attending Physician: José Miguel Beverly MD   Discharging Provider: Rani Simon NP  Primary Care Provider: Reed Ruiz MD    Primary Care Team: Networked reference to record PCT     HPI:   Jenniffer Jimenez is a 89-year-old female with a past medical history of CKD, diabetes, TIA osteoarthritis presents with cat bite.    Patient states that about 4 or 5 days ago, patient was bit by her cat on her right hand.  She was seen by an outside provider and prescribed Augmentin which she started last night.  She has taken 2 doses so far but she has been complaining of worsening painful abscess to the palm site of her hand, which has significant drainage and swelling.  She presents for further care.    Triage vitals exam significant for tachypnea.  Review of systems is significant for tender hand with drainage.  CBC significant for normocytic anemia, thrombocytopenia.  CMP is significant for slightly increased sugar.    UA significant for elevated WBCs.  Urine culture is pending.    Blood cultures are pending.    X-ray of right hand showed no acute findings.    ED provider gave patient Tdap vaccine injection.  Patient was also started on Unasyn.    Patient admitted for hand infection.    Procedure(s) (LRB):  IRRIGATION AND DEBRIDEMENT, UPPER EXTREMITY (Right)      Hospital Course:   She was admitted to the [facility]- service for further care. IV abx were ordered. Following blood cultures and urine cultures. Tdap was given in the ED. Will cont IV unasyn for now.   1/29-Urine cultures with no growth. Blood cultures with ngtd. Still following cultures from the hand. Will cont Unasyn for now for right hand animal scratch. Will ask hand sx to evaluate given the increased in  swelling on yesterday.  1/30- Patient seen and examined today. She report feeling better today. Vital signs stable. Plan I & D of right hand today.   1/31-status post I&D of the right and postop day 1.  Following we will continue Unasyn---can deescalate once sensitivities have resulted.  2/1-POD #2 continue Unasyn, sensitivities remain pending  2/3-POD4, cont Unasyn--awaiting sensitivities   2/4/24-OK to D/C per Dr. Moran on oral augmentin. Patient with stable vitals.  orders in.     Goals of Care Treatment Preferences:  Code Status: Full Code      Consults:   Consults (From admission, onward)          Status Ordering Provider     Inpatient consult to Orthopedic Surgery  Once        Provider:  Con Moran Jr., MD    [facility]            Orthopedic  Cat scratch  Infected cat bite of hand, right, initial encounter      Patient noted with a cat scratch to her right hand--patient states the cat is her cat and has been for the last 14 years.  -she was seen outpatient and started on Augmentin---patient has failed outpatient therapy  -blood cultures and wound cultures pending  -will cont Unasyn while inpatient and follow cultures  -1/29:  It was reported to the staff that on yesterday evening the wound was noted to be swollen more so than on admission, nursing stated they placed warm compress to the right hand which helped pustule draining.  We will continue IV Unasyn---leukocytosis improved and patient has been afebrile.  Will ask hand surgery to evaluate.  Follow blood cultures.  -1/31:  Appreciate Dr. Moran--status post I&D to the right hand.  Following surgical cultures.  Awaiting culture sensitivities.  2/1-POD#2, I&D, Cx pending continue Unasyn  2/3-POD4 I&D-cont abx  2/4-D/C home on oral augmentin per Dr. Moran        Final Active Diagnoses:    Diagnosis Date Noted POA    PRINCIPAL PROBLEM:  Infected cat bite of hand, right, initial encounter [S61.451A, L08.9, W55.01XA]  01/30/2024 Yes    Cat scratch [W55.03XA] 01/26/2024 Yes     Chronic    Heart failure with preserved ejection fraction [I50.30] 04/24/2023 Yes     Chronic    Overweight (BMI 25.0-29.9) [E66.3] 01/21/2019 Yes    Chronic a-fib [I48.20] 01/29/2016 Yes     Chronic    Primary hypertension [I10]  Yes     Chronic      Problems Resolved During this Admission:       Discharged Condition: stable    Disposition: Home or Self Care    Follow Up:   Follow-up Information       Afshan Schaeffer MD Follow up on 2/7/2024.    Specialty: Internal Medicine  Why: Appointment scheduled for 10 AM  Contact information:  [address]  [address]  [facility] [address]  [phone]               Brent Chapman Jr., MD Follow up on 2/15/2024.    Specialties: Interventional Cardiology, Cardiology  Why: A[ppointment scheduled for 10 AM  Contact information:  [address]  [address]  [facility] [address]  [phone]               Reed Ruiz MD Follow up on 2/21/2024.    Specialty: Internal Medicine  Why: Appointment scheduledfor 1: 00 PM  Contact information:  [address] [address]  [phone]               Con Moran Jr., MD. Schedule an appointment as soon as possible for a visit.    Specialties: Hand Surgery, Orthopedic Surgery  Why: For wound re-check  Contact information:  [address]  [address]  [facility] [address]  [phone]               [facility] Follow up.    Specialties: Home Health Services, Home Therapy Services, Home Living Aide Services  Contact information:  [address]  [facility] [address]  [phone]                         Patient Instructions:      Ambulatory referral/consult to Home Health   Standing Status: Future   Referral Priority: Routine Referral Type: Home Health   Referral Reason: Specialty Services Required   Requested Specialty: Home Health Services   Number of Visits Requested: 1     Diet Cardiac    Order Comments: Low salt     Change dressing (specify)   Order Comments: Dressing change: daily : Clean with peroxide and use kerlix wrap .     Activity as tolerated       Significant Diagnostic Studies: Labs: All labs within the past 24 hours have been reviewed    Pending Diagnostic Studies:       None           Medications:  Reconciled Home Medications:      Medication List        CHANGE how you take these medications      silver sulfADIAZINE 1% 1 % cream  Commonly known as: SILVADENE  Apply to RLE skin breakdown twice daily  What changed:   how to take this  when to take this            CONTINUE taking these medications      acetaminophen 500 MG tablet  Commonly known as: TYLENOL  Take 1,000 mg by mouth 2 (two) times a day.     albuterol-ipratropium 2.5 mg-0.5 mg/3 mL nebulizer solution  Commonly known as: DUO-NEB  Take 3 mLs by nebulization every 6 (six) hours as needed for Wheezing or Shortness of Breath. Rescue     amoxicillin-clavulanate 875-125mg 875-125 mg per tablet  Commonly known as: AUGMENTIN  Take 1 tablet by mouth 2 (two) times daily. for 14 days     aspirin 81 MG EC tablet  Commonly known as: ECOTRIN  Take 81 mg by mouth once daily.     clotrimazole-betamethasone 1-0.05% cream  Commonly known as: LOTRISONE  Apply topically 2 (two) times daily.     COMIRNATY 2023-24 (12Y UP)(PF) 30 mcg/0.3 mL inection  Generic drug: COVID vac23-24(12up)(raxt)(PF)     diclofenac sodium 1 % Gel  Commonly known as: VOLTAREN  Apply 2 g topically 4 (four) times daily. Apply to right hip     ferrous sulfate 325 (65 FE) MG EC tablet  Take 1 tablet (325 mg total) by mouth once daily.     FLUAD QUAD 2023-24(65Y UP)(PF) 60 mcg (15 mcg x 4)/0.5 mL Syrg  Generic drug: flu vac 2023 65up-cbbCT38M(PF)     furosemide 20 MG tablet  Commonly known as: LASIX  Take 2 tablets in the a.m. for weights 155-159, take 2 tablets twice a day for weights 160 over, none for weights less than 155     * miconazole 2 % cream  Commonly known as:  MICOTIN  Per instructions 2 TIMES DAILY (route: topical)     * miconazole nitrate 2% 2 % Oint  Commonly known as: MICOTIN  Apply topically 2 (two) times daily. Coloplast Clear Antifungal ointment- (green top)- nursing to apply to perineum, inner thighs, pannus, and buttocks BID     pravastatin 40 MG tablet  Commonly known as: PRAVACHOL  Take 1 tablet (40 mg total) by mouth once daily.     psyllium 0.52 gram capsule  Take 3 capsules by mouth 3 (three) times daily as needed (constipation).     spironolactone 25 MG tablet  Commonly known as: ALDACTONE  Take 1 tablet (25 mg total) by mouth once daily.     SYSTANE COMPLETE OPHT  Apply to eye.     triamcinolone acetonide 0.1% 0.1 % ointment  Commonly known as: KENALOG  Apply topically 2 (two) times daily. Use to affected areas for up to 2 weeks then take a 1 week break or decrease to 3 times weekly. Do not apply to groin or face. Apply to red scaly areas on the legs and arms     vitamin E 400 UNIT capsule  Take 400 Units by mouth once daily.           * This list has 2 medication(s) that are the same as other medications prescribed for you. Read the directions carefully, and ask your doctor or other care provider to review them with you.                STOP taking these medications      flucytosine 500 mg Cap  Commonly known as: ANCOBON              Indwelling Lines/Drains at time of discharge:   Lines/Drains/Airways       None                   Time spent on the discharge of patient: 35 minutes         Rani Simon NP  Department of Hospital Medicine  [place] [facility]

## 2024-02-04 NOTE — ASSESSMENT & PLAN NOTE
Infected cat bite of hand, right, initial encounter      Patient noted with a cat scratch to her right hand--patient states the cat is her cat and has been for the last 14 years.  -she was seen outpatient and started on Augmentin---patient has failed outpatient therapy  -blood cultures and wound cultures pending  -will cont Unasyn while inpatient and follow cultures  -1/29:  It was reported to the staff that on yesterday evening the wound was noted to be swollen more so than on admission, nursing stated they placed warm compress to the right hand which helped pustule draining.  We will continue IV Unasyn---leukocytosis improved and patient has been afebrile.  Will ask hand surgery to evaluate.  Follow blood cultures.  -1/31:  Appreciate Dr. Moran--status post I&D to the right hand.  Following surgical cultures.  Awaiting culture sensitivities.  2/1-POD#2, I&D, Cx pending continue Unasyn  2/3-POD4 I&D-cont abx  2/4-D/C home on oral augmentin per Dr. Moran

## 2024-02-05 ENCOUNTER — TELEPHONE (OUTPATIENT)
Dept: ORTHOPEDICS | Facility: CLINIC | Age: 89
End: 2024-02-05
Payer: MEDICARE

## 2024-02-05 PROCEDURE — G0180 MD CERTIFICATION HHA PATIENT: HCPCS | Mod: ,,, | Performed by: ORTHOPAEDIC SURGERY

## 2024-02-06 ENCOUNTER — TELEPHONE (OUTPATIENT)
Dept: ORTHOPEDICS | Facility: CLINIC | Age: 89
End: 2024-02-06
Payer: MEDICARE

## 2024-02-06 ENCOUNTER — PATIENT MESSAGE (OUTPATIENT)
Dept: ORTHOPEDICS | Facility: CLINIC | Age: 89
End: 2024-02-06
Payer: MEDICARE

## 2024-02-06 LAB — BACTERIA SPEC ANAEROBE CULT: ABNORMAL

## 2024-02-06 NOTE — TELEPHONE ENCOUNTER
----- Message from Jayashree Herbert sent at 2/6/2024  4:26 PM CST -----  Type:  Sooner Appointment Request    Caller is requesting a sooner appointment.  Caller declined first available appointment listed below.  Caller will not accept being placed on the waitlist and is requesting a message be sent to doctor.  Name of Caller:pt   When is the first available appointment?3/25  Symptoms:S61.451A,L08.9,W55.01XA (ICD-10-CM) - Infected cat bite of hand, right, initial encounter  Would the patient rather a call back or a response via [facility]? call  Best Call Back Number: [phone]  Additional Information: HFU discharged on 2/4/2024

## 2024-02-06 NOTE — TELEPHONE ENCOUNTER
----- Message from Elaine Cuevas sent at 2/5/2024  4:39 PM CST -----  Type:  Needs Medical Advice    Who Called: Pt   Would the patient rather a call back or a response via [facility]? Callback  Best Call Back Number:  [phone]  Additional Information: Pt is requesting a callback from this provider office in regards to f/u appt        
----- Message from Stacy Garsia sent at 2/5/2024  2:46 PM CST -----    Type:  Sooner Appointment Request    Caller is requesting a sooner appointment.  Caller declined first available appointment listed below.  Caller will not accept being placed on the waitlist and is requesting a message be sent to doctor.  Name of Caller:pt  When is the first available appointment?n/a  Symptoms:follow up  Would the patient rather a call back or a response via [facility]? call  Best Call Back Number:[phone]    Additional Information:  Please don't leave a message the pt said she is hard of hearing.  Pt needs an appt for Friday because of transportation problems after 12 pm.          
Attempted to reach pt at listed number - no answer. Unable to leave vm- vm is full . Will send message via portal.  
monika -[facility] -[phone]

## 2024-02-07 ENCOUNTER — OFFICE VISIT (OUTPATIENT)
Dept: PRIMARY CARE CLINIC | Facility: CLINIC | Age: 89
End: 2024-02-07
Payer: MEDICARE

## 2024-02-07 ENCOUNTER — TELEPHONE (OUTPATIENT)
Dept: ORTHOPEDICS | Facility: CLINIC | Age: 89
End: 2024-02-07
Payer: MEDICARE

## 2024-02-07 VITALS
OXYGEN SATURATION: 95 % | WEIGHT: 170.19 LBS | SYSTOLIC BLOOD PRESSURE: 130 MMHG | DIASTOLIC BLOOD PRESSURE: 71 MMHG | HEART RATE: 76 BPM | BODY MASS INDEX: 28.32 KG/M2

## 2024-02-07 DIAGNOSIS — S61.451A INFECTED CAT BITE OF HAND, RIGHT, INITIAL ENCOUNTER: ICD-10-CM
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
## 2024-02-07 DIAGNOSIS — L08.9 INFECTED CAT BITE OF HAND, RIGHT, INITIAL ENCOUNTER: ICD-10-CM

## 2024-02-07 DIAGNOSIS — L08.9: ICD-10-CM

## 2024-02-07 DIAGNOSIS — L03.90 PASTEURELLA CELLULITIS DUE TO CAT BITE: Primary | ICD-10-CM

## 2024-02-07 DIAGNOSIS — S61.451S: ICD-10-CM
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
## 2024-02-07 DIAGNOSIS — A28.0 PASTEURELLA CELLULITIS DUE TO CAT BITE: Primary | ICD-10-CM

## 2024-02-07 DIAGNOSIS — A49.8 BACTEROIDES INFECTION: ICD-10-CM

## 2024-02-07 PROCEDURE — 99214 OFFICE O/P EST MOD 30 MIN: CPT | Mod: HCNC,S$GLB,, | Performed by: INTERNAL MEDICINE

## 2024-02-07 PROCEDURE — 1159F MED LIST DOCD IN RCRD: CPT | Mod: HCNC,CPTII,S$GLB, | Performed by: INTERNAL MEDICINE

## 2024-02-07 PROCEDURE — 3288F FALL RISK ASSESSMENT DOCD: CPT | Mod: HCNC,CPTII,S$GLB, | Performed by: INTERNAL MEDICINE

## 2024-02-07 PROCEDURE — 1126F AMNT PAIN NOTED NONE PRSNT: CPT | Mod: HCNC,CPTII,S$GLB, | Performed by: INTERNAL MEDICINE

## 2024-02-07 PROCEDURE — 1111F DSCHRG MED/CURRENT MED MERGE: CPT | Mod: HCNC,CPTII,S$GLB, | Performed by: INTERNAL MEDICINE

## 2024-02-07 PROCEDURE — 1101F PT FALLS ASSESS-DOCD LE1/YR: CPT | Mod: HCNC,CPTII,S$GLB, | Performed by: INTERNAL MEDICINE

## 2024-02-07 PROCEDURE — 1160F RVW MEDS BY RX/DR IN RCRD: CPT | Mod: HCNC,CPTII,S$GLB, | Performed by: INTERNAL MEDICINE

## 2024-02-07 PROCEDURE — 99999 PR PBB SHADOW E&M-EST. PATIENT-LVL IV: CPT | Mod: PBBFAC,HCNC,, | Performed by: INTERNAL MEDICINE

## 2024-02-07 NOTE — TELEPHONE ENCOUNTER
----- Message from Rosette Anderson LPN sent at 2/7/2024 10:26 AM CST -----  Good morning. This patient has a post op scheduled but only wants to see Dr. Moran. Could you assist in changing her appointment to be with Dr. Moran?    Thank you,  Rosette

## 2024-02-07 NOTE — PROGRESS NOTES
[facility]   New Visit Progress Note   Recent Hospital Discharge     PRESENTING HISTORY     Chief Complaint/Reason for Admission:  Follow up Hospital Discharge   PCP: Reed Ruiz MD    History of Present Illness:  Ms. Jenniffer Jimenez is a 89 y.o. female who was recently admitted to the hospital.    [facility] Medicine  Discharge Summary        Patient Name: Jenniffer Jimenez  MRN: [id number]  NANCY: [id number]  Patient Class: IP- Inpatient  Admission Date: 1/27/2024  Hospital Length of Stay: 8 days  Discharge Date and Time:  02/04/2024 11:38 AM  Attending Physician: José Miguel Beverly MD   Discharging Provider: Rani Simon NP  Primary Care Provider: Reed Ruiz MD  ___________________________________________________________________    Today:  [facility] to [facility] for initial hospital follow up.  Recently hospitalized for management of cat bite to right hand.   Had two doses outpatient Augmentin, but hand pain progressed and she presented to ED for evaluation.   Tdap vaccine administered in ED and Unasyn initiated.  Imaging unremarkable.   UA concerning for infection.  Patient admitted to [facility] Medicine service with consultation to Orthopedic hand surgery team.   Underwent I&D right hand abscess on 1/30/24.   Abscess Cx with growth of Bacteroides pyogenes and Pasteurella multocida.    Blood and Urine Cx NGTD.   Discharged to home on 14 day course Augmentin.   [facility] arranged.     Patient accompanied today by family.  Ambulatory with Rol[facility].   Brought medication bottles for review and reports compliance.    Review of Systems  General ROS: negative for chills, fever or weight loss  Psychological ROS: negative for hallucination, depression or suicidal ideation  Ophthalmic ROS: negative for blurry vision, photophobia or eye pain  ENT ROS: negative for epistaxis, sore throat or rhinorrhea  Respiratory ROS: no cough, shortness of breath, or  wheezing  Cardiovascular ROS: no chest pain or dyspnea on exertion  Gastrointestinal ROS: no abdominal pain, change in bowel habits, or black/ bloody stools  Genito-Urinary ROS: no dysuria, trouble voiding, or hematuria  Musculoskeletal ROS: +unsteady gait     Neurological ROS: no syncope or seizures; no ataxia  Dermatological ROS: negative for pruritis, rash and jaundice      PAST HISTORY:     Past Medical History:   Diagnosis Date    Amblyopia of left eye 04/10/2013    Arthritis     Facet arthropathy, Lumbosacral    Atherosclerosis of aorta     Cataract     Central retinal vein occlusion of left eye     CKD (chronic kidney disease) stage 3, GFR 30-59 ml/min     Diabetes mellitus, type 2     Diabetic polyneuropathy 2022    Exotropia of both eyes 2013    recession RSR 5.0mm w/ adj; recession LR os 5.0 w/ adj; resect MR os  4.0mm    Hearing loss     History of resection of small bowel     Hypertensive retinopathy of both eyes     Hypoglycemia     Macular degeneration     OA (osteoarthritis) of shoulder     Right    Osteoporosis     Posterior vitreous detachment of both eyes     Psychiatric problem     Rhinitis     TIA (transient ischemic attack)        Past Surgical History:   Procedure Laterality Date    APPENDECTOMY      CARDIAC CATHETERIZATION      CATARACT EXTRACTION W/  INTRAOCULAR LENS IMPLANT Bilateral      SECTION, CLASSIC      CLOSURE OF LEFT ATRIAL APPENDAGE USING DEVICE N/A 2020    Procedure: Left atrial appendage closure device;  Surgeon: Abundio Curtis MD;  Location: [facility] CATH LAB;  Service: Cardiology;  Laterality: N/A;    HYSTERECTOMY      INNER EAR SURGERY      IRRIGATION AND DEBRIDEMENT OF UPPER EXTREMITY Right 2024    Procedure: IRRIGATION AND DEBRIDEMENT, UPPER EXTREMITY;  Surgeon: Con Moran Jr., MD;  Location: [facility] OR;  Service: Orthopedics;  Laterality: Right;    JOINT REPLACEMENT      LEFT KNEE REPLACEMENT IN 2013 -    OOPHORECTOMY      SINUS SURGERY       STRABISMUS SURGERY  2/6/13    RSR recession 5 mm, LLR recession 5 mm and LMR resection 4mm    STRABISMUS SURGERY  03/19/2014    recess LR OD 6mm    TONSILLECTOMY      watchman surgery N/A 11/2020       Family History   Problem Relation Age of Onset    Hypertension Mother     Hypertension Father     Liver disease Sister     Diabetes Sister     Heart disease Sister     Diabetes Brother     Breast cancer Maternal Aunt     No Known Problems Maternal Uncle     No Known Problems Paternal Aunt     No Known Problems Paternal Uncle     No Known Problems Maternal Grandmother     No Known Problems Maternal Grandfather     No Known Problems Paternal Grandmother     No Known Problems Paternal Grandfather     No Known Problems Daughter     No Known Problems Son     Heart disease Sister     No Known Problems Son     No Known Problems Son     Cancer Neg Hx         in first degree relatives    Melanoma Neg Hx     Psoriasis Neg Hx     Lupus Neg Hx     Amblyopia Neg Hx     Blindness Neg Hx     Cataracts Neg Hx     Glaucoma Neg Hx     Macular degeneration Neg Hx     Retinal detachment Neg Hx     Strabismus Neg Hx     Stroke Neg Hx     Thyroid disease Neg Hx          MEDICATIONS & ALLERGIES:     Current Outpatient Medications on File Prior to Visit   Medication Sig Dispense Refill    acetaminophen (TYLENOL) 500 MG tablet Take 1,000 mg by mouth 2 (two) times a day.      albuterol-ipratropium (DUO-NEB) 2.5 mg-0.5 mg/3 mL nebulizer solution Take 3 mLs by nebulization every 6 (six) hours as needed for Wheezing or Shortness of Breath. Rescue 75 mL 0    amoxicillin-clavulanate 875-125mg (AUGMENTIN) 875-125 mg per tablet Take 1 tablet by mouth 2 (two) times daily. for 14 days 28 tablet 0    aspirin (ECOTRIN) 81 MG EC tablet Take 81 mg by mouth once daily.      clotrimazole-betamethasone 1-0.05% (LOTRISONE) cream Apply topically 2 (two) times daily. 45 g 1    COMIRNATY 2023-24, 12Y UP,,PF, 30 mcg/0.3 mL inection       diclofenac sodium  "(VOLTAREN) 1 % Gel Apply 2 g topically 4 (four) times daily. Apply to right hip      ferrous sulfate 325 (65 FE) MG EC tablet Take 1 tablet (325 mg total) by mouth once daily.      FLUAD QUAD 2023-24,65Y UP,,PF, 60 mcg (15 mcg x 4)/0.5 mL Syrg       furosemide (LASIX) 20 MG tablet Take 2 tablets in the a.m. for weights 155-159, take 2 tablets twice a day for weights 160 over, none for weights less than 155 120 tablet 11    miconazole (MICOTIN) 2 % cream Per instructions 2 TIMES DAILY (route: topical)      miconazole nitrate 2% (MICOTIN) 2 % Oint Apply topically 2 (two) times daily. Coloplast Clear Antifungal ointment- (green top)- nursing to apply to perineum, inner thighs, pannus, and buttocks  g 0    pravastatin (PRAVACHOL) 40 MG tablet Take 1 tablet (40 mg total) by mouth once daily. 90 tablet 3    propylene glycol (SYSTANE COMPLETE OPHT) Apply to eye.      psyllium 0.52 gram capsule Take 3 capsules by mouth 3 (three) times daily as needed (constipation).      silver sulfADIAZINE 1% (SILVADENE) 1 % cream Apply to RLE skin breakdown twice daily (Patient taking differently: Apply topically 2 (two) times daily. Apply to RLE skin breakdown twice daily)      spironolactone (ALDACTONE) 25 MG tablet Take 1 tablet (25 mg total) by mouth once daily. 90 tablet 3    triamcinolone acetonide 0.1% (KENALOG) 0.1 % ointment Apply topically 2 (two) times daily. Use to affected areas for up to 2 weeks then take a 1 week break or decrease to 3 times weekly. Do not apply to groin or face. Apply to red scaly areas on the legs and arms 454 g 1    vitamin E 400 UNIT capsule Take 400 Units by mouth once daily.       No current facility-administered medications on file prior to visit.        Review of patient's allergies indicates:   Allergen Reactions    Opioids - morphine analogues Other (See Comments)     Bowel issues; bowel obstruction    Tizanidine Other (See Comments)     "Lips were numb,  Almost passed out."    Tramadol " Hallucinations    Beta-blockers (beta-adrenergic blocking agts) Other (See Comments)     Can not go on beta blockers for long period of time - due to taking allergy injections    Morphine     Opioids-meperidine and related     Ciprofloxacin Rash       OBJECTIVE:     Vital Signs:  /71 (BP Location: Right arm, Patient Position: Sitting, BP Method: Small (Automatic))   Pulse 76   Wt 77.2 kg (170 lb 3.1 oz)   LMP  (LMP Unknown)   SpO2 95%   BMI 28.32 kg/m²   Wt Readings from Last 3 Encounters:   02/07/24 1001 77.2 kg (170 lb 3.1 oz)   02/03/24 0113 81 kg (178 lb 9.2 oz)   02/02/24 0033 76.1 kg (167 lb 12.3 oz)   01/30/24 0412 78.7 kg (173 lb 8 oz)   01/29/24 0600 77.9 kg (171 lb 11.8 oz)   01/27/24 1834 76.2 kg (167 lb 15.9 oz)   01/27/24 1056 76.2 kg (168 lb)   01/26/24 1547 78.3 kg (172 lb 9.9 oz)     Body mass index is 28.32 kg/m².        Physical Exam:  /71 (BP Location: Right arm, Patient Position: Sitting, BP Method: Small (Automatic))   Pulse 76   Wt 77.2 kg (170 lb 3.1 oz)   LMP  (LMP Unknown)   SpO2 95%   BMI 28.32 kg/m²   General appearance: alert, cooperative, no distress  Constitutional:Oriented to person, place, and time  + appears well-developed and well-nourished.   HEENT: Normocephalic, atraumatic, neck symmetrical, no nasal discharge   Eyes: conjunctivae/corneas clear, PERRL, EOM's intact  Lungs: clear to auscultation bilaterally, no dullness to percussion bilaterally  Heart: regular rate and rhythm without rub; no displacement of the PMI   Abdomen: soft, non-tender; bowel sounds normoactive; no organomegaly  Extremities: extremities symmetric; no clubbing, cyanosis, or edema  + right hand - wound with intact scab, no draining, minimal surrounding erythema ; hand/thumb with full range of motion, no tenderness to palpation   Integument: Skin color, texture, turgor normal; no rashes; hair distrubution normal  Neurologic: Alert and oriented X 3, normal strength, normal coordination    Psychiatric: no pressured speech; normal affect; no evidence of impaired cognition     Laboratory  Lab Results   Component Value Date    WBC 3.66 (L) 02/03/2024    HGB 10.2 (L) 02/03/2024    HCT 29.7 (L) 02/03/2024    MCV 87 02/03/2024     (L) 02/03/2024     BMP  Lab Results   Component Value Date     02/03/2024    K 4.8 02/03/2024     02/03/2024    CO2 21 (L) 02/03/2024    BUN 32 (H) 02/03/2024    CREATININE 1.4 02/03/2024    CALCIUM 9.1 02/03/2024    ANIONGAP 10 02/03/2024    EGFRNORACEVR 36 (A) 02/03/2024     Lab Results   Component Value Date    ALT 16 01/27/2024    AST 14 01/27/2024    ALKPHOS 110 01/27/2024    BILITOT 1.0 01/27/2024     Lab Results   Component Value Date    INR 1.1 04/22/2023    INR 1.0 10/06/2020    INR 1.3 (H) 08/05/2020     Lab Results   Component Value Date    HGBA1C 5.9 (H) 04/28/2023       ASSESSMENT & PLAN:     Pasteurella cellulitis due to cat bite  Infected cat bite of hand, right, sequela  Bacteroides infection  - recent hospitalization as above  - s/p I&D on 1/30/24   - wound Cx with growth of Bacteroides and Pasteurella   - continue Augmentin to complete 14 day course  - see Orthopedic team 2/12/24     Patient will be released from hospital follow up clinic.  She will see her PCP, Dr Reed Ruiz, 2/21/24.   Instructions for the patient:      Scheduled Follow-up :  Future Appointments   Date Time Provider Department Center   2/12/2024  1:00 PM Beverly Chu PA-C [facility]THO [facility]   2/15/2024 10:00 AM Brent Chapman Jr., MD [facility] CARDIO [place]   2/21/2024  1:00 PM Reed Ruzi MD [facility] IM [place]   3/25/2024  3:20 PM Con Moran Jr., MD [facility]TH[facility]   4/25/2024 10:45 AM Lennie Louis, DPM [facility] POD [facility]       Post Visit Medication List:     Medication List            Accurate as of February 7, 2024 10:53 AM. If you have any questions, ask your nurse or doctor.                CHANGE how you take these medications       silver sulfADIAZINE 1% 1 % cream  Commonly known as: SILVADENE  Apply to RLE skin breakdown twice daily  What changed:   how to take this  when to take this            CONTINUE taking these medications      acetaminophen 500 MG tablet  Commonly known as: TYLENOL     albuterol-ipratropium 2.5 mg-0.5 mg/3 mL nebulizer solution  Commonly known as: DUO-NEB  Take 3 mLs by nebulization every 6 (six) hours as needed for Wheezing or Shortness of Breath. Rescue     amoxicillin-clavulanate 875-125mg 875-125 mg per tablet  Commonly known as: AUGMENTIN  Take 1 tablet by mouth 2 (two) times daily. for 14 days     aspirin 81 MG EC tablet  Commonly known as: ECOTRIN     clotrimazole-betamethasone 1-0.05% cream  Commonly known as: LOTRISONE  Apply topically 2 (two) times daily.     COMIRNATY 2023-24 (12Y UP)(PF) 30 mcg/0.3 mL inection  Generic drug: COVID [id number](12up)(raxt)(PF)     diclofenac sodium 1 % Gel  Commonly known as: VOLTAREN     ferrous sulfate 325 (65 FE) MG EC tablet  Take 1 tablet (325 mg total) by mouth once daily.     FLUAD QUAD 2023-24(65Y UP)(PF) 60 mcg (15 mcg x 4)/0.5 mL Syrg  Generic drug: flu vac 2023 [id number](PF)     furosemide 20 MG tablet  Commonly known as: LASIX  Take 2 tablets in the a.m. for weights 155-159, take 2 tablets twice a day for weights 160 over, none for weights less than 155     * miconazole 2 % cream  Commonly known as: MICOTIN     * miconazole nitrate 2% 2 % Oint  Commonly known as: MICOTIN  Apply topically 2 (two) times daily. Coloplast Clear Antifungal ointment- (green top)- nursing to apply to perineum, inner thighs, pannus, and buttocks BID     pravastatin 40 MG tablet  Commonly known as: PRAVACHOL  Take 1 tablet (40 mg total) by mouth once daily.     psyllium 0.52 gram capsule     spironolactone 25 MG tablet  Commonly known as: ALDACTONE  Take 1 tablet (25 mg total) by mouth once daily.     SYSTANE COMPLETE OPHT     triamcinolone acetonide 0.1% 0.1 % ointment  Commonly  known as: KENALOG  Apply topically 2 (two) times daily. Use to affected areas for up to 2 weeks then take a 1 week break or decrease to 3 times weekly. Do not apply to groin or face. Apply to red scaly areas on the legs and arms     vitamin E 400 UNIT capsule           * This list has 2 medication(s) that are the same as other medications prescribed for you. Read the directions carefully, and ask your doctor or other care provider to review them with you.                  Signing Physician:  Afshan Schaeffer MD

## 2024-02-12 ENCOUNTER — TELEPHONE (OUTPATIENT)
Dept: ORTHOPEDICS | Facility: CLINIC | Age: 89
End: 2024-02-12
Payer: MEDICARE

## 2024-02-12 NOTE — TELEPHONE ENCOUNTER
Spoke with Jenniffer, in regards to missed appointment on today. Patient states that she was unaware of appointment on today. Attempted to reschedule patient to Wednesday with a different PA, but patient states that she has to get transportation. Patient rescheduled to 2/23 at 3:30 PM with Carol Sanchez PA-C, as patient states that she is only available next Friday. Spoke with Dr. Moran, and he states this is ok. All questions answered and verbalization expressed.

## 2024-02-14 ENCOUNTER — TELEPHONE (OUTPATIENT)
Dept: AUDIOLOGY | Facility: CLINIC | Age: 89
End: 2024-02-14
Payer: MEDICARE

## 2024-02-14 NOTE — TELEPHONE ENCOUNTER
Joselo chamberlain/ MsRaul Abad to inform her that her hearing aid is back from repair. She was not ready to schedule an appointment but will call this clinic back tomorrow. She was given our phone number.

## 2024-02-15 ENCOUNTER — OFFICE VISIT (OUTPATIENT)
Dept: CARDIOLOGY | Facility: CLINIC | Age: 89
End: 2024-02-15
Payer: MEDICARE

## 2024-02-15 VITALS
WEIGHT: 170.63 LBS | DIASTOLIC BLOOD PRESSURE: 81 MMHG | SYSTOLIC BLOOD PRESSURE: 147 MMHG | HEART RATE: 84 BPM | BODY MASS INDEX: 28.4 KG/M2

## 2024-02-15 DIAGNOSIS — I50.32 CHRONIC HEART FAILURE WITH PRESERVED EJECTION FRACTION: Primary | Chronic | ICD-10-CM

## 2024-02-15 DIAGNOSIS — I70.0 ATHEROSCLEROSIS OF AORTA: ICD-10-CM

## 2024-02-15 DIAGNOSIS — I27.22 PULMONARY HYPERTENSION DUE TO LEFT VENTRICULAR DIASTOLIC DYSFUNCTION: ICD-10-CM

## 2024-02-15 DIAGNOSIS — I48.20 CHRONIC A-FIB: Chronic | ICD-10-CM

## 2024-02-15 DIAGNOSIS — Z95.818 PRESENCE OF WATCHMAN LEFT ATRIAL APPENDAGE CLOSURE DEVICE: Chronic | ICD-10-CM

## 2024-02-15 DIAGNOSIS — I10 PRIMARY HYPERTENSION: Chronic | ICD-10-CM

## 2024-02-15 DIAGNOSIS — E78.00 PURE HYPERCHOLESTEROLEMIA: Chronic | ICD-10-CM

## 2024-02-15 PROCEDURE — 99214 OFFICE O/P EST MOD 30 MIN: CPT | Mod: S$GLB,,, | Performed by: INTERNAL MEDICINE

## 2024-02-15 PROCEDURE — 99999 PR PBB SHADOW E&M-EST. PATIENT-LVL III: CPT | Mod: PBBFAC,,, | Performed by: INTERNAL MEDICINE

## 2024-02-15 PROCEDURE — 1111F DSCHRG MED/CURRENT MED MERGE: CPT | Mod: CPTII,S$GLB,, | Performed by: INTERNAL MEDICINE

## 2024-02-15 PROCEDURE — 1159F MED LIST DOCD IN RCRD: CPT | Mod: CPTII,S$GLB,, | Performed by: INTERNAL MEDICINE

## 2024-02-15 PROCEDURE — 1126F AMNT PAIN NOTED NONE PRSNT: CPT | Mod: CPTII,S$GLB,, | Performed by: INTERNAL MEDICINE

## 2024-02-15 NOTE — PROGRESS NOTES
Subjective:   02/15/2024     Patient ID:  Jenniffer Jimenez is a 89 y.o. female who presents for evaulation of Follow-up      Comes in for follow-up of heart failure.   Recently hospitalized with a cat bite infection.  Doing well from the heart standpoint.  Blood pressures have been normal at home.    Has chronic atrial fibrillation, rate controlled, no anticoagulation post Watchman.  She is status placement of a left atrial appendage occluder 2021, November.  Follow up in Interventional Clinic, note made that she should take SBE prophylaxis for life, she has not been doing that.    She has previous had diastolic heart failure, shortness of breath not increasing, she was on spironolactone, caused hyperkalemia , but now back on spironolactone and tolerating well.. She had been placed on SG LT 2 inhibitor therapy in the past, had not wish to take though.  Worried about side effects.            Echocardiogram 4/23:   · The left ventricle is normal in size with concentric hypertrophy and normal systolic function.  · The estimated ejection fraction is 55%.  · Indeterminate left ventricular diastolic function.  · With normal right ventricular systolic function.  · Severe left atrial enlargement.  · Severe right atrial enlargement.  · Mild mitral regurgitation.  · Mild to moderate tricuspid regurgitation.  · There is pulmonary hypertension.  · The estimated PA systolic pressure is 56 mmHg.  · Elevated central venous pressure (15 mmHg).       Prior note:   She comes in for cardiac follow-up.  Lots of problems recently with lower extremity cellulitis.  Hospitalized for that, developed delirium.  Subsequent to our last visit, she developed dehydration was seen in the emergency room.  Her weight has increased since then from 149 lb to 155 lb.  She is not had chest pains or tightness    She has not had increasing chest pains or tightness, shortness of breath, PND or orthopnea.    Comes in today for heart failure follow-up.   I would put her on Jardiance last visit, she never took it, worried about side effects.  She had been off of spironolactone also, review of the chart shows 1 episode of hyperkalemia, now on the low side.      Her swelling has been stable, wounds of the lower extremity are healing, no exertional chest pains tightness or shortness of breath, no PND orthopnea.    Hypercholesterolemia is on present, on moderate dose statin therapy.       She does have chronic atrial fibrillation is status post Watchman device as noted above.  Her heart rate is well controlled.    Prior note:    She presented for evaluation of chest pain in February of 2021.  She had noted increasing swelling in her legs.  She had taken additional furosemide.  She is status post Watchman device placement in December of 2020.  She is off anticoagulation, now on aspirin only.  She has not had bleeding problems.  She does not have a history of chest pain.  Cardiac catheterization performed many years ago was normal.  Echocardiography continue show evidence for diastolic dysfunction with increased PA pressures and increased left atrial size.  She has not had increasing shortness of breath.  Her weight has been stable, taking additional furosemide and aldosterone is needed.  Laboratory work recently has shown stable renal to and potassium.          She was felt to have acute on chronic chronic diastolic heart failure.  My recommendations then were:  1.  Discontinue potassium chloride  2.  Take furosemide 20 mg twice a day to get rid of fluid, decrease to once a day when fluid improved.  3.  Take spironolactone 25 mg 1 with each furosemide.  4.  Call me if you have recurrent chest pain.  5.  Weigh yourself daily.  Record this and bring it in with your next visit     I saw her 2 weeks after the initial presentation.  She was markedly improved.  Recommendations then were:    1.  Decrease Lasix/furosemide and Aldactone/spironolactone to 1 a day as long as weight  stays below 156 lb, okay to increase to twice a day for increased weight.  2.  Please do a blood test for me today to check kidney function.    She developed an episode of lightheadedness and her weight target was changed to 160lb.      Echocardiogram from January 2022:  · The left ventricle is normal in size with normal systolic function.  · The estimated ejection fraction is 65%.  · Severe left atrial enlargement.  · Grade III left ventricular diastolic dysfunction.  · The estimated PA systolic pressure is 51 mmHg.  · Normal right ventricular size with normal right ventricular systolic function.  · There is mild pulmonary hypertension.  · Intermediate central venous pressure (8 mmHg).  · Moderate right atrial enlargement.  · Moderate tricuspid regurgitation.  · Mild mitral regurgitation.       Recent carotid ultrasound shows mild bilateral disease:  There is 20-39% right Internal Carotid Stenosis.  There is 40-49% left Internal Carotid Stenosis.      Congestive Heart Failure  Pertinent negatives include no chest pain, claudication, near-syncope or palpitations.   Hypertension  Associated symptoms include headaches. Pertinent negatives include no chest pain, orthopnea, palpitations or PND.   Hyperlipidemia  Pertinent negatives include no chest pain.   Follow-up  Associated symptoms include headaches and numbness. Pertinent negatives include no chest pain.       Patient Active Problem List   Diagnosis    Pure hypercholesterolemia    Pseudophakia, both eyes    Stable central retinal vein occlusion of left eye    Chronic rhinitis    Hearing loss, sensorineural    Primary hypertension    Arthritis    Exotropia of both eyes    Debility    Gait instability    Meniere's disease    Facet arthropathy, lumbosacral    Lumbar spinal stenosis    Hypermetropia of right eye    Chronic a-fib    Stage 3b chronic kidney disease    Atherosclerosis of aorta    History of TIA (transient ischemic attack)    Osteoporosis    Primary  "osteoarthritis of right shoulder    History of strabismus surgery    Encounter for long-term (current) use of antiplatelets/antithrombotics    Prediabetes    Refractive error    Posterior vitreous detachment, bilateral    Dry eye syndrome    Overweight (BMI 25.0-29.9)    Risk for falls    OAB (overactive bladder)    Venous stasis dermatitis    BRVO (branch retinal vein occlusion)    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 eye with active choroidal neovascularization    Hypertensive retinopathy of both eyes    Unspecified inflammatory spondylopathy, lumbosacral region    Presence of Watchman left atrial appendage closure device    Normocytic anemia    Cervicogenic headache    Senile purpura    Skin tear of forearm without complication, left, initial encounter    Tear of left supraspinatus tendon    Weakness of shoulder    Impaired function of upper extremity    Normocytic normochromic anemia    Open wound of left lower extremity    Unable to care for self    Hyperkalemia    Trochanteric bursitis of right hip    Intertrigo    Generalized skin eruption due to medication taken internally    High anion gap metabolic acidosis    Delirium due to medical condition with behavioral disturbance    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    History of diabetes mellitus    Polyneuropathy in diseases classified elsewhere    Microcytic anemia    Heart failure with preserved ejection fraction    Uses walker    Cat bite    Infected cat bite of hand, right, sequela        Review of patient's allergies indicates:   Allergen Reactions    Opioids - morphine analogues Other (See Comments)     Bowel issues; bowel obstruction    Tizanidine Other (See Comments)     "Lips were numb,  Almost passed out."    Tramadol Hallucinations    Beta-blockers (beta-adrenergic blocking agts) Other (See Comments)     Can not go on beta blockers for long period of time - due to taking allergy injections    Morphine  "    Opioids-meperidine and related     Ciprofloxacin Rash         Current Outpatient Medications:     albuterol-ipratropium (DUO-NEB) 2.5 mg-0.5 mg/3 mL nebulizer solution, Take 3 mLs by nebulization every 6 (six) hours as needed for Wheezing or Shortness of Breath. Rescue, Disp: 75 mL, Rfl: 0    amoxicillin-clavulanate 875-125mg (AUGMENTIN) 875-125 mg per tablet, Take 1 tablet by mouth 2 (two) times daily. for 14 days, Disp: 28 tablet, Rfl: 0    aspirin (ECOTRIN) 81 MG EC tablet, Take 81 mg by mouth once daily., Disp: , Rfl:     clotrimazole-betamethasone 1-0.05% (LOTRISONE) cream, Apply topically 2 (two) times daily., Disp: 45 g, Rfl: 1    COMIRNATY 2023-24, 12Y UP,,PF, 30 mcg/0.3 mL inection, , Disp: , Rfl:     diclofenac sodium (VOLTAREN) 1 % Gel, Apply 2 g topically 4 (four) times daily. Apply to right hip, Disp: , Rfl:     furosemide (LASIX) 20 MG tablet, Take 2 tablets in the a.m. for weights 155-159, take 2 tablets twice a day for weights 160 over, none for weights less than 155, Disp: 120 tablet, Rfl: 11    miconazole (MICOTIN) 2 % cream, Per instructions 2 TIMES DAILY (route: topical), Disp: , Rfl:     miconazole nitrate 2% (MICOTIN) 2 % Oint, Apply topically 2 (two) times daily. Coloplast Clear Antifungal ointment- (green top)- nursing to apply to perineum, inner thighs, pannus, and buttocks BID, Disp: 141 g, Rfl: 0    pravastatin (PRAVACHOL) 40 MG tablet, Take 1 tablet (40 mg total) by mouth once daily., Disp: 90 tablet, Rfl: 3    propylene glycol (SYSTANE COMPLETE OPHT), Apply to eye., Disp: , Rfl:     psyllium 0.52 gram capsule, Take 3 capsules by mouth 3 (three) times daily as needed (constipation)., Disp: , Rfl:     silver sulfADIAZINE 1% (SILVADENE) 1 % cream, Apply to RLE skin breakdown twice daily (Patient taking differently: Apply topically 2 (two) times daily. Apply to RLE skin breakdown twice daily), Disp: , Rfl:     spironolactone (ALDACTONE) 25 MG tablet, Take 1 tablet (25 mg total) by mouth  once daily., Disp: 90 tablet, Rfl: 3    triamcinolone acetonide 0.1% (KENALOG) 0.1 % ointment, Apply topically 2 (two) times daily. Use to affected areas for up to 2 weeks then take a 1 week break or decrease to 3 times weekly. Do not apply to groin or face. Apply to red scaly areas on the legs and arms, Disp: 454 g, Rfl: 1    vitamin E 400 UNIT capsule, Take 400 Units by mouth once daily., Disp: , Rfl:     ferrous sulfate 325 (65 FE) MG EC tablet, Take 1 tablet (325 mg total) by mouth once daily., Disp: , Rfl:     FLUAD QUAD 2023-24,65Y UP,,PF, 60 mcg (15 mcg x 4)/0.5 mL Syrg, , Disp: , Rfl:      Objective:   Review of Systems   Cardiovascular:  Negative for chest pain, claudication, cyanosis, dyspnea on exertion, irregular heartbeat, leg swelling, near-syncope, orthopnea, palpitations, paroxysmal nocturnal dyspnea and syncope.   Musculoskeletal:  Positive for arthritis and muscle cramps.   Gastrointestinal:  Positive for constipation and diarrhea.   Neurological:  Positive for headaches, loss of balance, numbness and paresthesias.       Vitals:    02/15/24 0949   BP: (!) 147/81   Pulse: 84             Physical Exam  Vitals reviewed.   Constitutional:       General: She is not in acute distress.     Appearance: She is well-developed.   HENT:      Head: Normocephalic and atraumatic.      Nose: Nose normal.   Eyes:      Conjunctiva/sclera: Conjunctivae normal.      Pupils: Pupils are equal, round, and reactive to light.   Neck:      Vascular: Hepatojugular reflux and JVD present. No carotid bruit.   Cardiovascular:      Rate and Rhythm: Normal rate and regular rhythm.      Pulses: Intact distal pulses.      Heart sounds: Normal heart sounds. No murmur heard.     No friction rub. No gallop.      Comments: Jugular venous pressure is normal.  No edema  Pulmonary:      Effort: Pulmonary effort is normal. No respiratory distress.      Breath sounds: Normal breath sounds. No wheezing or rales.   Chest:      Chest wall: No  tenderness.   Abdominal:      General: Bowel sounds are normal. There is no distension.      Palpations: Abdomen is soft.      Tenderness: There is no abdominal tenderness.   Musculoskeletal:         General: No tenderness or deformity. Normal range of motion.      Cervical back: Normal range of motion and neck supple.      Right lower leg: Edema (Trace) present.      Left lower leg: Edema (trace) present.      Comments: Both lower extremities below the knee wrapped   Skin:     General: Skin is warm and dry.      Findings: No erythema or rash.   Neurological:      Mental Status: She is alert and oriented to person, place, and time.      Cranial Nerves: No cranial nerve deficit.      Motor: No abnormal muscle tone.      Coordination: Coordination normal.   Psychiatric:         Behavior: Behavior normal.         Thought Content: Thought content normal.         Judgment: Judgment normal.          Assessment and Plan:     Chronic heart failure with preserved ejection fraction  Comments:  continue volume management  Did not wish to try SG LT 2 inhibitor therapy unless problems would occur with current therapy  Orders:  -     Echo; Future; Expected date: 08/15/2024    Chronic a-fib  Comments:  accepted as rhythm of choice    Presence of Watchman left atrial appendage closure device  Comments:  endocarditis prophylaxis required    Primary hypertension  Comments:  generally better controlled, check blood pressures at home    Pure hypercholesterolemia  Comments:  on moderate dose statin therapy    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

## 2024-02-16 ENCOUNTER — HOSPITAL ENCOUNTER (EMERGENCY)
Facility: HOSPITAL | Age: 89
Discharge: HOME | End: 2024-02-16
Attending: EMERGENCY MEDICINE
Payer: MEDICARE

## 2024-02-16 VITALS
HEIGHT: 65 IN | HEART RATE: 72 BPM | TEMPERATURE: 98 F | DIASTOLIC BLOOD PRESSURE: 56 MMHG | BODY MASS INDEX: 27.82 KG/M2 | OXYGEN SATURATION: 97 % | WEIGHT: 167 LBS | RESPIRATION RATE: 16 BRPM | SYSTOLIC BLOOD PRESSURE: 122 MMHG
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
## 2024-02-16 DIAGNOSIS — M79.602 LEFT ARM PAIN: ICD-10-CM

## 2024-02-16 DIAGNOSIS — M25.512 LEFT SHOULDER PAIN: ICD-10-CM

## 2024-02-16 DIAGNOSIS — S40.022A CONTUSION OF LEFT UPPER EXTREMITY, INITIAL ENCOUNTER: Primary | ICD-10-CM

## 2024-02-16 DIAGNOSIS — M25.532 LEFT WRIST PAIN: ICD-10-CM

## 2024-02-16 DIAGNOSIS — M25.522 LEFT ELBOW PAIN: ICD-10-CM

## 2024-02-16 PROCEDURE — 25000003 PHARM REV CODE 250: Performed by: EMERGENCY MEDICINE

## 2024-02-16 PROCEDURE — 99284 EMERGENCY DEPT VISIT MOD MDM: CPT | Mod: 25,HCNC

## 2024-02-16 RX ORDER — NAPROXEN 250 MG/1
250 TABLET ORAL
Status: COMPLETED | OUTPATIENT
Start: 2024-02-16 | End: 2024-02-16

## 2024-02-16 RX ORDER — OXYCODONE AND ACETAMINOPHEN 5; 325 MG/1; MG/1
1 TABLET ORAL EVERY 6 HOURS PRN
Qty: 12 TABLET | Refills: 0 | Status: SHIPPED | OUTPATIENT
Start: 2024-02-16 | End: 2024-02-19

## 2024-02-16 RX ORDER — ACETAMINOPHEN 325 MG/1
650 TABLET ORAL
Status: COMPLETED | OUTPATIENT
Start: 2024-02-16 | End: 2024-02-16

## 2024-02-16 RX ADMIN — NAPROXEN 250 MG: 250 TABLET ORAL at 11:02

## 2024-02-16 RX ADMIN — ACETAMINOPHEN 650 MG: 325 TABLET ORAL at 09:02

## 2024-02-16 NOTE — ED NOTES
Pt assisted to bedside commode, x1 assist, stand-pivot. Pt voided. Sample collected. Pt assisted back to bed. Denies any complaints at this time. Call light within reach.

## 2024-02-16 NOTE — DISCHARGE INSTRUCTIONS
Your x-rays today did not show any new fractures or broken bones.  However, if your pain does not improve, it may be necessary to repeat x-rays in 1-2 weeks. You may use the arm sling for comfort. Follow up with the Orthopedic clinic listed as soon as you can.       Thank you for choosing [facility]!     Our goal in the Emergency Department is to always provide outstanding medical care. You may receive a survey by mail or e-mail in the next week regarding your experience today. We would greatly appreciate you completing and returning the survey. Your feedback provides us with a way to recognize our staff who provide very good care, and it helps us learn how to improve when your experience was below our aspiration of excellence.      It is important to remember that some problems are difficult to diagnose and may not be found during your first visit. Be sure to follow up with your primary care doctor and review any labs/imaging that was performed during your visit with them. If you do not have a primary care doctor, you may contact the one listed on your discharge paperwork, or you may also call the [facility] Appointment Desk at [phone] to schedule an appointment.     All medications may potentially have side effects and it is impossible to predict which medications may give you side effects. If you feel that you are having a negative effect of any medication you should immediately stop taking them and seek medical attention.  Do not drive or make any important decisions for 24 hours if you have received any pain medications, sedatives or mood altering drugs during your ER visit.    We appreciate you trusting us with your medical care. We will be happy to take care of you for all of your future medical needs. You may return to the ER at any time for any new/concerning symptoms, worsening condition, or failure to improve. We hope you feel better soon.     Sincerely,    Abrahan Boswell Jr.,  MD  Board-Certified Emergency Medicine Physician  [facility]

## 2024-02-16 NOTE — ED NOTES
Pain to left shoulder. No obvious deformity noted at present time. Neurovascular intact. Pain 9/10

## 2024-02-16 NOTE — ED PROVIDER NOTES
"Emergency Department Encounter  Provider Note    Jenniffer Jimenez  [id number]  2/16/2024    Evaluation:    History Acquisition:     Chief Complaint   Patient presents with    Fall     Lost balance and fell just PTA - presents awake, alert with c/o left shoulder and left chest wall pain. Resp unlabored. + pulses       History of Present Illness:  Jenniffer Jimenez is a 89 y.o. female who has a past medical history of Amblyopia of left eye (04/10/2013), Arthritis, Atherosclerosis of aorta, Cataract, Central retinal vein occlusion of left eye, CKD (chronic kidney disease) stage 3, GFR 30-59 ml/min, Diabetes mellitus, type 2, Diabetic polyneuropathy (01/06/2022), Exotropia of both eyes (02/06/2013), Hearing loss, History of resection of small bowel, Hypertensive retinopathy of both eyes, Hypoglycemia, Macular degeneration, OA (osteoarthritis) of shoulder, Osteoporosis, Posterior vitreous detachment of both eyes, Psychiatric problem, Rhinitis, and TIA (transient ischemic attack).    The patient presents to the ED due to L arm pain.   Patient reports she lost her balance and fell onto her L side.  She endorses L shoulder and upper arm pain.   She states it is her "bad arm" as she had a previous surgery from a shoulder fracture.  No head impact, LOC, neck/back pain, or lower extremity pain/injury. She was able to stand after the fall. No other complaints or concerns. She requests only medication that won't make her sleepy, confused, or constipated.    Additional historians utilized:  Daughter at bedside, states she has had surgery on L arm in the past. She lives in an apartment.     Prior medical records were reviewed:   2/15 - cards visit for f/u CHF  2/7 - priority care appointment for f/u cat bite with abscess  Admitted 1/27 for hand abscess after cat bite    The patient's list of active medical problems, social history, medications, and allergies as documented has been reviewed.     Past Medical History:   Diagnosis " Date    Amblyopia of left eye 04/10/2013    Arthritis     Facet arthropathy, Lumbosacral    Atherosclerosis of aorta     Cataract     Central retinal vein occlusion of left eye     CKD (chronic kidney disease) stage 3, GFR 30-59 ml/min     Diabetes mellitus, type 2     Diabetic polyneuropathy 2022    Exotropia of both eyes 2013    recession RSR 5.0mm w/ adj; recession LR os 5.0 w/ adj; resect MR os  4.0mm    Hearing loss     History of resection of small bowel     Hypertensive retinopathy of both eyes     Hypoglycemia     Macular degeneration     OA (osteoarthritis) of shoulder     Right    Osteoporosis     Posterior vitreous detachment of both eyes     Psychiatric problem     Rhinitis     TIA (transient ischemic attack)      Past Surgical History:   Procedure Laterality Date    APPENDECTOMY      CARDIAC CATHETERIZATION      CATARACT EXTRACTION W/  INTRAOCULAR LENS IMPLANT Bilateral      SECTION, CLASSIC      CLOSURE OF LEFT ATRIAL APPENDAGE USING DEVICE N/A 2020    Procedure: Left atrial appendage closure device;  Surgeon: Abundio Curtis MD;  Location: [facility] CATH LAB;  Service: Cardiology;  Laterality: N/A;    HYSTERECTOMY      INNER EAR SURGERY      IRRIGATION AND DEBRIDEMENT OF UPPER EXTREMITY Right 2024    Procedure: IRRIGATION AND DEBRIDEMENT, UPPER EXTREMITY;  Surgeon: Con Moran Jr., MD;  Location: [facility] OR;  Service: Orthopedics;  Laterality: Right;    JOINT REPLACEMENT      LEFT KNEE REPLACEMENT IN  -    OOPHORECTOMY      SINUS SURGERY      STRABISMUS SURGERY  13    RSR recession 5 mm, LLR recession 5 mm and LMR resection 4mm    STRABISMUS SURGERY  2014    recess LR OD 6mm    TONSILLECTOMY      watchman surgery N/A 2020     Family History   Problem Relation Age of Onset    Hypertension Mother     Hypertension Father     Liver disease Sister     Diabetes Sister     Heart disease Sister     Diabetes Brother     Breast cancer Maternal Aunt     No Known  Problems Maternal Uncle     No Known Problems Paternal Aunt     No Known Problems Paternal Uncle     No Known Problems Maternal Grandmother     No Known Problems Maternal Grandfather     No Known Problems Paternal Grandmother     No Known Problems Paternal Grandfather     No Known Problems Daughter     No Known Problems Son     Heart disease Sister     No Known Problems Son     No Known Problems Son     Cancer Neg Hx         in first degree relatives    Melanoma Neg Hx     Psoriasis Neg Hx     Lupus Neg Hx     Amblyopia Neg Hx     Blindness Neg Hx     Cataracts Neg Hx     Glaucoma Neg Hx     Macular degeneration Neg Hx     Retinal detachment Neg Hx     Strabismus Neg Hx     Stroke Neg Hx     Thyroid disease Neg Hx      Social History     Socioeconomic History    Marital status:    Occupational History    Occupation: retired   Tobacco Use    Smoking status: Former     Current packs/day: 0.00     Types: Cigarettes     Quit date: 10/29/1982     Years since quittin.3     Passive exposure: Never    Smokeless tobacco: Never   Substance and Sexual Activity    Alcohol use: Not Currently     Alcohol/week: 2.0 standard drinks of alcohol     Types: 2 Shots of liquor per week     Comment: rare    Drug use: No    Sexual activity: Yes   Other Topics Concern    Are you pregnant or think you may be? No    Breast-feeding No     Social Determinants of Health     Financial Resource Strain: Low Risk  (2024)    Overall Financial Resource Strain (CARDIA)     Difficulty of Paying Living Expenses: Not hard at all   Food Insecurity: No Food Insecurity (2024)    Hunger Vital Sign     Worried About Running Out of Food in the Last Year: Never true     Ran Out of Food in the Last Year: Never true   Transportation Needs: No Transportation Needs (2024)    PRAPARE - Transportation     Lack of Transportation (Medical): No     Lack of Transportation (Non-Medical): No   Physical Activity: Inactive (2024)    Exercise  "Vital Sign     Days of Exercise per Week: 0 days     Minutes of Exercise per Session: 0 min   Stress: Stress Concern Present (1/29/2024)    Citizen of [place] of Occupational Health - Occupational Stress Questionnaire     Feeling of Stress : To some extent   Social Connections: Unknown (1/29/2024)    Social Connection and Isolation Panel [NHANES]     Frequency of Communication with Friends and Family: More than three times a week     Frequency of Social Gatherings with Friends and Family: More than three times a week     Marital Status:    Housing Stability: Low Risk  (1/29/2024)    Housing Stability Vital Sign     Unable to Pay for Housing in the Last Year: No     Number of Places Lived in the Last Year: 1     Unstable Housing in the Last Year: No     Review of patient's allergies indicates:   Allergen Reactions    Opioids - morphine analogues Other (See Comments)     Bowel issues; bowel obstruction    Tizanidine Other (See Comments)     "Lips were numb,  Almost passed out."    Tramadol Hallucinations    Beta-blockers (beta-adrenergic blocking agts) Other (See Comments)     Can not go on beta blockers for long period of time - due to taking allergy injections    Morphine     Opioids-meperidine and related     Ciprofloxacin Rash       Review of Systems   Musculoskeletal:  Positive for arthralgias and myalgias.         Physical Exam:     Initial Vitals [02/16/24 0851]   BP Pulse Resp Temp SpO2   (!) 148/70 82 18 98.2 °F (36.8 °C) 100 %      MAP       --         Physical Exam    Nursing note and vitals reviewed.  Constitutional: She appears well-developed and well-nourished. She is not diaphoretic. No distress.   HENT:   Head: Normocephalic and atraumatic.   Mouth/Throat: Oropharynx is clear and moist.   Eyes: EOM are normal. Pupils are equal, round, and reactive to light.   Neck: No tracheal deviation present.   Cardiovascular:  Normal rate, regular rhythm, normal heart sounds and intact distal pulses.         "   Pulmonary/Chest: Breath sounds normal. No stridor. No respiratory distress. She has no wheezes.   Abdominal: Abdomen is soft. Bowel sounds are normal. She exhibits no distension and no mass. There is no abdominal tenderness.   Musculoskeletal:         General: No edema. Normal range of motion.      Comments: TTP L shoulder, arm, elbow, wrist. Limited ROM due to pain. No deformity.   Distal pulses and sensation intact.      Neurological: She is alert and oriented to person, place, and time. She has normal strength. No cranial nerve deficit or sensory deficit.   Skin: Skin is warm and dry. Capillary refill takes less than 2 seconds. No pallor.   Psychiatric: She has a normal mood and affect. Her behavior is normal. Thought content normal.         Differential Diagnoses:   Based on available information and initial assessment, Differential Diagnosis includes, but is not limited to:  Fracture, dislocation, compartment syndrome, nerve injury/palsy, vascular injury, DVT, rhabdomyolysis, hemarthrosis, septic joint, cellulitis, bursitis, muscle strain, ligament tear/sprain, laceration, foreign body, abrasion, soft tissue contusion, osteoarthritis.      ED Management:   Procedures    Orders Placed This Encounter    ORTHOPEDIC BRACING FOR HOME USE - UPPER EXTREMITY    X-Ray Shoulder Trauma Left    X-Ray Humerus 2 View Left    X-Ray Elbow Complete Left    X-Ray Wrist Complete Left    X-Ray Chest AP Portable    Ambulatory referral/consult to Orthopedics    Apply Sling    acetaminophen tablet 650 mg    naproxen tablet 250 mg    oxyCODONE-acetaminophen (PERCOCET) 5-325 mg per tablet          EKG:       Labs:   Labs Reviewed - No data to display  Independent review of the labs ordered include:       Imaging:     Imaging Results              X-Ray Wrist Complete Left (Final result)  Result time 02/16/24 10:57:11      Final result by Juan Jose Perera MD (02/16/24 10:57:11)                   Narrative:    EXAMINATION:  XR WRIST  COMPLETE 3 VIEWS LEFT    CLINICAL HISTORY:  Pain in left wrist    TECHNIQUE:  PA, lateral, and oblique views of the left wrist were performed.    COMPARISON:  None    FINDINGS:  Articular and triangular fibro cartilaginous calcification is evidence suggesting CPPD.  Prominent degenerative changes are seen at the 1st and 2nd carpal/metacarpal joint.  No definite acute fracture subluxation is seen about the wrist.  Vascular calcifications are noted.      Electronically signed by: Juan Jose Perera MD  Date:    02/16/2024  Time:    10:57                                     X-Ray Shoulder Trauma Left (Final result)  Result time 02/16/24 11:00:33      Final result by Juan Jose Perera MD (02/16/24 11:00:33)                   Narrative:    EXAMINATION:  XR SHOULDER TRAUMA 3 VIEW LEFT; XR HUMERUS 2 VIEW LEFT    CLINICAL HISTORY:  Pain in left shoulder; Pain in left arm    TECHNIQUE:  Three views of the left shoulder were performed.  Two views of the left humerus were also performed.    COMPARISON  December 23, 2021    FINDINGS:  An intramedullary krysten is in place in the humerus and there is no evidence of displacement or complication.  Acromioclavicular and glenohumeral degenerative changes are present.  No definite acute fracture seen about the left shoulder or humerus.  There is a possible elbow effusion.      Electronically signed by: Juan Jose Perera MD  Date:    02/16/2024  Time:    11:00                                     X-Ray Humerus 2 View Left (Final result)  Result time 02/16/24 11:00:33      Final result by Juan Jose Perera MD (02/16/24 11:00:33)                   Narrative:    EXAMINATION:  XR SHOULDER TRAUMA 3 VIEW LEFT; XR HUMERUS 2 VIEW LEFT    CLINICAL HISTORY:  Pain in left shoulder; Pain in left arm    TECHNIQUE:  Three views of the left shoulder were performed.  Two views of the left humerus were also performed.    COMPARISON  December 23, 2021    FINDINGS:  An intramedullary krysten is in place in the  humerus and there is no evidence of displacement or complication.  Acromioclavicular and glenohumeral degenerative changes are present.  No definite acute fracture seen about the left shoulder or humerus.  There is a possible elbow effusion.      Electronically signed by: Juan Jose Perera MD  Date:    02/16/2024  Time:    11:00                                     X-Ray Elbow Complete Left (Final result)  Result time 02/16/24 10:53:29      Final result by Juan Jose Perera MD (02/16/24 10:53:29)                   Narrative:    EXAMINATION:  XR ELBOW COMPLETE 3 VIEW LEFT    CLINICAL HISTORY:  Pain in left elbow    TECHNIQUE:  AP, lateral, and oblique views of the left elbow were performed.    COMPARISON:  None    FINDINGS:  The lateral view suggest a joint effusion.  There is moderate demineralization about the elbow.  A definite displaced fracture is not seen but there is limited ability to position on the AP view.  There is a questionable lucency in the supracondylar region and a nondisplaced supracondylar fracture cannot be excluded.  A portion of intramedullary krysten is visualized      Electronically signed by: Juan Jose Perera MD  Date:    02/16/2024  Time:    10:53                                     X-Ray Chest AP Portable (Final result)  Result time 02/16/24 10:55:44      Final result by Juan Jose Perera MD (02/16/24 10:55:44)                   Impression:      Suggestion of some increased atelectasis in the left base in this patient with chronic globular enlargement of cardiac silhouette and interstitial changes in both parul thoraces.      Electronically signed by: Juan Jose Perera MD  Date:    02/16/2024  Time:    10:55               Narrative:    EXAMINATION:  XR CHEST AP PORTABLE    CLINICAL HISTORY:  chest pain after fall;    TECHNIQUE:  Single frontal view of the chest was performed.    COMPARISON:  April 28, 2023    FINDINGS:  There is moderate globular enlargement of the cardiac silhouette.  A left  intramedullary krysten is seen in the humerus.  Acromioclavicular degenerative hypertrophy is seen bilaterally.  There is apparent relative elevation of the left hemidiaphragm and there is suggestion of left basal atelectasis versus airspace disease.  No definite pneumothorax is seen.                                         Medications Given:     Medications   acetaminophen tablet 650 mg (650 mg Oral Given 2/16/24 0943)   naproxen tablet 250 mg (250 mg Oral Given 2/16/24 1156)        Medical Decision Making:    Additional Consideration:   Additional testing considered during clinical course: none    Social determinants of health considered during development of treatment plan include: poor access to care    Current co-morbidities considered which impacted clinical decision making: HTN, HLD, a-fib, CHF, CKD    Case discussed with additional provider: none    ED Course as of 02/17/24 0630   Fri Feb 16, 2024   1131 SpO2: 100 % [SS]   1131 Resp: 18 [SS]   1131 Pulse: 82 [SS]   1131 Temp Source: Oral [SS]   1131 Temp: 98.2 °F (36.8 °C) [SS]   1131 BP(!): 148/70  Vitals reassuring [SS]   1131 X-Ray Shoulder Trauma Left  Shoulder x-ray independently interpreted: post-op IM krysten in place, no acute fracture or dislocation.  Agree with radiologist interpretation.    [SS]   1132 X-Ray Humerus 2 View Left  Humerus x-ray independently interpreted: IM krysten in place, no fracture or dislocation.  Agree with radiologist interpretation.    [SS]   1132 X-Ray Wrist Complete Left  Wrist x-ray independently interpreted: degenerative changes, no fracture or dislocation.  Agree with radiologist interpretation.    [SS]   1133 X-Ray Chest AP Portable  CXR independently interpreted: no displaced rib fracture, edema, free air, or other acute process.  Agree with radiologist interpretation.    [SS]   1133 X-Ray Elbow Complete Left  Elbow x-ray independently interpreted: no fracture or dislocation. IM krysten in place.  Agree with radiologist  interpretation.    [SS]      ED Course User Index  [SS] Abrahan Boswell MD            Medical Decision Making  89 yo F presents after mechanical fall. L arm pain on exam.  X-rays without displaced fracture.  Will place in sling for comfort and refer to Ortho for f/u and repeat x-rays if pain does not improve.  Daughter states she will stay with patient to monitor and help with pain control.   Recommend NSAIDs, pain medication RX provided for severe pain.  Stable for D/C.    Problems Addressed:  Contusion of left upper extremity, initial encounter: acute illness or injury  Fall, initial encounter: acute illness or injury  Left arm pain: acute illness or injury  Left elbow pain: acute illness or injury  Left shoulder pain: acute illness or injury  Left wrist pain: acute illness or injury    Amount and/or Complexity of Data Reviewed  Independent Historian: guardian  External Data Reviewed: notes.  Radiology: ordered and independent interpretation performed. Decision-making details documented in ED Course.    Risk  OTC drugs.  Prescription drug management.  Diagnosis or treatment significantly limited by social determinants of health.        Clinical Impression:       ICD-10-CM ICD-9-CM   1. Contusion of left upper extremity, initial encounter  S40.022A 923.9   2. Left shoulder pain  M25.512 719.41   3. Left arm pain  M79.602 729.5   4. Left elbow pain  M25.522 719.42   5. Left wrist pain  M25.532 719.43   6. Fall, initial encounter  W19.XXXA E888.9       Discharge Medications:  Discharge Medication List as of 2/16/2024 12:36 PM        START taking these medications    Details   oxyCODONE-acetaminophen (PERCOCET) 5-325 mg per tablet Take 1 tablet by mouth every 6 (six) hours as needed for Pain (severe pain)., Starting Fri 2/16/2024, Until Mon 2/19/2024 at 2355, Print               Follow-up Information       Follow up With Specialties Details Why Contact Info Additional Information    Reed Ruiz MD Internal Medicine  Schedule an appointment as soon as possible for a visit   [address] [facility]  [place] [address]  [phone]       [facility] Orthopedics Schedule an appointment as soon as possible for a visit   [address]  [facility] [address] [phone] Please park in [address] or D and use [employer]. Take Medical Office Bldg. elevators.             ED Disposition Condition    Discharge Stable              On re-evaluation, the patient's status has improved.  After complete ED evaluation, clinical impression is most consistent with L arm pain, fall.  PCP/Ortho follow-up as soon as possible was recommended.    After taking into careful account the patient's history, physical exam findings, as well as empirical and objective data obtained throughout ED workup, I feel no emergent medical condition has been identified. No further evaluation or admission was felt to be required, and the patient is stable for discharge from the ED. The patient and any additional family present were updated with test results, overall clinical impression, and recommended further plan of care, including discharge instructions as provided and outpatient follow-up for continued evaluation and management as needed. All questions were answered. The patient expressed understanding and agreed with current plan for discharge and follow-up plan of care. Strict ED return precautions were provided, including return/worsening of current symptoms, new symptoms, or any other concerns.       Abrahan Boswell MD  02/17/24 0658

## 2024-02-20 ENCOUNTER — PATIENT OUTREACH (OUTPATIENT)
Dept: EMERGENCY MEDICINE | Facility: HOSPITAL | Age: 89
End: 2024-02-20
Payer: MEDICARE

## 2024-02-20 NOTE — TELEPHONE ENCOUNTER
----- Message from Priyanka Madrid sent at 11/17/2022  8:33 AM CST -----  Regarding: refill  Contact: patient [phone]  1MEDICALADVICE     Patient is calling for Medical Advice regarding: Pain for Bursitis in hip    How long has patient had these symptoms: 3-4 weeks    Pharmacy name and phone#: [facility]  [address]  [place] [address]  Phone: [phone] Fax: [phone]    Would like response via [employer]:  please call Will be back home after 2:00pm if a call is needed.            Provider at patient bedside at this time.     Landy Gallegos RN  02/20/24 0515

## 2024-02-21 ENCOUNTER — OFFICE VISIT (OUTPATIENT)
Dept: INTERNAL MEDICINE | Facility: CLINIC | Age: 89
End: 2024-02-21
Payer: MEDICARE

## 2024-02-21 VITALS
HEIGHT: 65 IN | SYSTOLIC BLOOD PRESSURE: 128 MMHG | DIASTOLIC BLOOD PRESSURE: 80 MMHG | BODY MASS INDEX: 27.85 KG/M2 | OXYGEN SATURATION: 98 % | RESPIRATION RATE: 20 BRPM | WEIGHT: 167.13 LBS | TEMPERATURE: 98 F | HEART RATE: 70 BPM

## 2024-02-21 DIAGNOSIS — E78.00 PURE HYPERCHOLESTEROLEMIA: Chronic | ICD-10-CM

## 2024-02-21 DIAGNOSIS — D69.2 SENILE PURPURA: ICD-10-CM

## 2024-02-21 DIAGNOSIS — H35.3221 EXUDATIVE AGE-RELATED MACULAR DEGENERATION OF LEFT EYE WITH ACTIVE CHOROIDAL NEOVASCULARIZATION: ICD-10-CM

## 2024-02-21 DIAGNOSIS — I50.32 CHRONIC HEART FAILURE WITH PRESERVED EJECTION FRACTION: Chronic | ICD-10-CM
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
## 2024-02-21 DIAGNOSIS — I70.0 ATHEROSCLEROSIS OF AORTA: Chronic | ICD-10-CM

## 2024-02-21 DIAGNOSIS — S46.012D TRAUMATIC TEAR OF LEFT ROTATOR CUFF, UNSPECIFIED TEAR EXTENT, SUBSEQUENT ENCOUNTER: ICD-10-CM

## 2024-02-21 DIAGNOSIS — D61.818 PANCYTOPENIA: ICD-10-CM

## 2024-02-21 DIAGNOSIS — I10 PRIMARY HYPERTENSION: Primary | Chronic | ICD-10-CM

## 2024-02-21 DIAGNOSIS — M46.97 INFLAMMATORY SPONDYLOPATHY OF LUMBOSACRAL REGION: ICD-10-CM
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
## 2024-02-21 DIAGNOSIS — H34.8122 STABLE CENTRAL RETINAL VEIN OCCLUSION OF LEFT EYE: ICD-10-CM

## 2024-02-21 DIAGNOSIS — I48.20 CHRONIC A-FIB: Chronic | ICD-10-CM

## 2024-02-21 DIAGNOSIS — N18.32 STAGE 3B CHRONIC KIDNEY DISEASE: ICD-10-CM

## 2024-02-21 DIAGNOSIS — G63 POLYNEUROPATHY IN DISEASES CLASSIFIED ELSEWHERE: ICD-10-CM

## 2024-02-21 PROBLEM — Z86.39 HISTORY OF DIABETES MELLITUS: Status: RESOLVED | Noted: 2023-01-26 | Resolved: 2024-02-21

## 2024-02-21 PROCEDURE — 99999 PR PBB SHADOW E&M-EST. PATIENT-LVL V: CPT | Mod: PBBFAC,,, | Performed by: INTERNAL MEDICINE

## 2024-02-21 PROCEDURE — 99214 OFFICE O/P EST MOD 30 MIN: CPT | Mod: HCNC,S$GLB,, | Performed by: INTERNAL MEDICINE

## 2024-02-21 PROCEDURE — 1111F DSCHRG MED/CURRENT MED MERGE: CPT | Mod: HCNC,CPTII,S$GLB, | Performed by: INTERNAL MEDICINE

## 2024-02-21 PROCEDURE — 3288F FALL RISK ASSESSMENT DOCD: CPT | Mod: HCNC,CPTII,S$GLB, | Performed by: INTERNAL MEDICINE

## 2024-02-21 PROCEDURE — 1101F PT FALLS ASSESS-DOCD LE1/YR: CPT | Mod: HCNC,CPTII,S$GLB, | Performed by: INTERNAL MEDICINE

## 2024-02-21 PROCEDURE — 1125F AMNT PAIN NOTED PAIN PRSNT: CPT | Mod: HCNC,CPTII,S$GLB, | Performed by: INTERNAL MEDICINE

## 2024-02-21 PROCEDURE — 1159F MED LIST DOCD IN RCRD: CPT | Mod: HCNC,CPTII,S$GLB, | Performed by: INTERNAL MEDICINE

## 2024-02-21 RX ORDER — GABAPENTIN 300 MG/1
300 CAPSULE ORAL NIGHTLY
Qty: 30 CAPSULE | Refills: 0 | Status: SHIPPED | OUTPATIENT
Start: 2024-02-21 | End: 2025-02-20

## 2024-02-21 NOTE — PROGRESS NOTES
Subjective:       Patient ID: Jenniffer Jimenez is a 89 y.o. female.    Chief Complaint: Fall    HPI    89-year-old female with CKD stage IIIB, pancytopenia, stable central arterial vein occlusion left eye, exudative age-related macular degeneration, inflammatory spondylopathy, senile purpura, polyneuropathy here for follow-up.    HTN - Patient is currently on Aldactone 25 mg. She does not check her BP at home. Side effects of medications note: none. Denies headaches, blurred vision, chest pain, shortness of breath, nausea.    She was recently in the hospital for a cat bite and received antibiotics.  She is healing well for this.    She fell Friday morning and went to [place].  She lost her balance in her kitchen and waited until someone came to deliver her paper.  She had the door open and got the paper person to come in and help her.  No fractures noted on the xrays.  She has not been tolerating the percocet, because of constipation.  She has been tylenol to help with the pain.  It does not help as much as she would like.  She has home health coming for the bite wound.    HLD/atherosclerosis of aorta - Patient is currently on pravastatin 40 mg.  Her last lipid panel was   Cholesterol   Date Value Ref Range Status   07/21/2023 170 120 - 199 mg/dL Final     Comment:     The National Cholesterol Education Program (NCEP) has set the  following guidelines (reference ranges) for Cholesterol:  Optimal.....................<200 mg/dL  Borderline High.............200-239 mg/dL  High........................> or = 240 mg/dL       Triglycerides   Date Value Ref Range Status   07/21/2023 60 30 - 150 mg/dL Final     Comment:     The National Cholesterol Education Program (NCEP) has set the  following guidelines (reference values) for triglycerides:  Normal......................<150 mg/dL  Borderline High.............150-199 mg/dL  High........................200-499 mg/dL       HDL   Date Value Ref Range Status   07/21/2023 65  40 - 75 mg/dL Final     Comment:     The National Cholesterol Education Program (NCEP) has set the  following guidelines (reference values) for HDL Cholesterol:  Low...............<40 mg/dL  Optimal...........>60 mg/dL       LDL Cholesterol   Date Value Ref Range Status   07/21/2023 93.0 63.0 - 159.0 mg/dL Final     Comment:     The National Cholesterol Education Program (NCEP) has set the  following guidelines (reference values) for LDL Cholesterol:  Optimal.......................<130 mg/dL  Borderline High...............130-159 mg/dL  High..........................160-189 mg/dL  Very High.....................>190 mg/dL     .  Side effects of the medication: none.    Review of Systems      Objective:      Physical Exam  Vitals reviewed.   Constitutional:       Appearance: She is well-developed.   HENT:      Head: Normocephalic and atraumatic.      Mouth/Throat:      Pharynx: No oropharyngeal exudate.   Eyes:      General: No scleral icterus.        Right eye: No discharge.         Left eye: No discharge.      Pupils: Pupils are equal, round, and reactive to light.   Neck:      Thyroid: No thyromegaly.      Trachea: No tracheal deviation.   Cardiovascular:      Rate and Rhythm: Normal rate and regular rhythm.      Heart sounds: Normal heart sounds. No murmur heard.     No friction rub. No gallop.   Pulmonary:      Effort: Pulmonary effort is normal. No respiratory distress.      Breath sounds: Normal breath sounds. No wheezing or rales.   Chest:      Chest wall: No tenderness.   Abdominal:      General: Bowel sounds are normal. There is no distension.      Palpations: Abdomen is soft. There is no mass.      Tenderness: There is no abdominal tenderness. There is no guarding or rebound.   Musculoskeletal:         General: No tenderness. Normal range of motion.      Cervical back: Normal range of motion and neck supple.   Skin:     General: Skin is warm and dry.      Coloration: Skin is not pale.      Findings: No  erythema or rash.   Neurological:      Mental Status: She is alert and oriented to person, place, and time.   Psychiatric:         Behavior: Behavior normal.         Assessment:       1. Primary hypertension    2. Pure hypercholesterolemia    3. Atherosclerosis of aorta    4. Chronic a-fib    5. Chronic heart failure with preserved ejection fraction    6. Stage 3b chronic kidney disease    7. Pancytopenia    8. Stable central retinal vein occlusion of left eye    9. 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 eye with active choroidal neovascularization    10. Inflammatory spondylopathy of lumbosacral region    11. Senile purpura    12. Polyneuropathy in diseases classified elsewhere    13. Fall, subsequent encounter    14. Cat bite, subsequent encounter    15. Traumatic tear of left rotator cuff, unspecified tear extent, subsequent encounter      Plan:       1. Continue Aldactone 25 mg.    2/3.  Continue pravastatin 40 mg p.o.  4. Monitor.  Continue aspirin 81 mg p.o.  5.  Monitor   6. Monitor  7.  Monitor  8.  Monitor  9.  Monitor  10.  Monitor  11.  Monitor  12.  Monitor   13. Continue with home health and get physical therapy.  14.  Monitor.  Healed.    15. Home health and Orthopedics.

## 2024-02-22 ENCOUNTER — PATIENT MESSAGE (OUTPATIENT)
Dept: ADMINISTRATIVE | Facility: HOSPITAL | Age: 89
End: 2024-02-22
Payer: MEDICARE

## 2024-02-22 ENCOUNTER — TELEPHONE (OUTPATIENT)
Dept: INTERNAL MEDICINE | Facility: CLINIC | Age: 89
End: 2024-02-22
Payer: MEDICARE

## 2024-02-22 NOTE — PROGRESS NOTES
Subjective:      Patient ID: Jenniffer Jimenez is a 89 y.o. female.    Chief Complaint: Post-op Evaluation of the Right Hand (3 week po /I&D) and Injury of the Left Shoulder      HPI:  Jenniffer Jimenez is here for a postop visit.     Surgery: I&D abscess dorsal R hand secondary to cat bite  Date of Surgery: 1/30/2024  Surgeon: Dr. Moran    She is doing well.   Pain has been controlled.   Has been compliant with abx (augmentin x 14 days), which are now completed  Denies numbness and tingling   Post surgical complaints include: none.   No n/v, fever, chills, SOB, leg pain, surgical site irritation or drainage.    Cultures during hospitalization: + pasturella multicida, + bacteriodes pyogenes      Unrelated, pt has been having left shoulder pain since a fall on 2/16/2024  She was seen in the ER for this injury  She endorses having shoulder pain and stiffness prior the the fall, which is now worse  Not wearing shoulder sling provided by the ER    Past Medical History:   Diagnosis Date    Amblyopia of left eye 04/10/2013    Arthritis     Facet arthropathy, Lumbosacral    Atherosclerosis of aorta     Cataract     Central retinal vein occlusion of left eye     CKD (chronic kidney disease) stage 3, GFR 30-59 ml/min     Diabetes mellitus, type 2     Diabetic polyneuropathy 01/06/2022    Exotropia of both eyes 02/06/2013    recession RSR 5.0mm w/ adj; recession LR os 5.0 w/ adj; resect MR os  4.0mm    Hearing loss     History of resection of small bowel     Hypertensive retinopathy of both eyes     Hypoglycemia     Macular degeneration     OA (osteoarthritis) of shoulder     Right    Osteoporosis     Posterior vitreous detachment of both eyes     Psychiatric problem     Rhinitis     TIA (transient ischemic attack)        Current Outpatient Medications:     albuterol-ipratropium (DUO-NEB) 2.5 mg-0.5 mg/3 mL nebulizer solution, Take 3 mLs by nebulization every 6 (six) hours as needed for Wheezing or Shortness of  Breath. Rescue, Disp: 75 mL, Rfl: 0    aspirin (ECOTRIN) 81 MG EC tablet, Take 81 mg by mouth once daily., Disp: , Rfl:     clotrimazole-betamethasone 1-0.05% (LOTRISONE) cream, Apply topically 2 (two) times daily., Disp: 45 g, Rfl: 1    COMIRNATY 2023-24, 12Y UP,,PF, 30 mcg/0.3 mL inection, , Disp: , Rfl:     diclofenac sodium (VOLTAREN) 1 % Gel, Apply 2 g topically 4 (four) times daily. Apply to right hip, Disp: , Rfl:     ferrous sulfate 325 (65 FE) MG EC tablet, Take 1 tablet (325 mg total) by mouth once daily., Disp: , Rfl:     FLUAD QUAD 2023-24,65Y UP,,PF, 60 mcg (15 mcg x 4)/0.5 mL Syrg, , Disp: , Rfl:     furosemide (LASIX) 20 MG tablet, Take 2 tablets in the a.m. for weights 155-159, take 2 tablets twice a day for weights 160 over, none for weights less than 155, Disp: 120 tablet, Rfl: 11    gabapentin (NEURONTIN) 300 MG capsule, Take 1 capsule (300 mg total) by mouth every evening., Disp: 30 capsule, Rfl: 0    miconazole (MICOTIN) 2 % cream, Per instructions 2 TIMES DAILY (route: topical), Disp: , Rfl:     miconazole nitrate 2% (MICOTIN) 2 % Oint, Apply topically 2 (two) times daily. Coloplast Clear Antifungal ointment- (green top)- nursing to apply to perineum, inner thighs, pannus, and buttocks BID, Disp: 141 g, Rfl: 0    pravastatin (PRAVACHOL) 40 MG tablet, Take 1 tablet (40 mg total) by mouth once daily., Disp: 90 tablet, Rfl: 3    propylene glycol (SYSTANE COMPLETE OPHT), Apply to eye., Disp: , Rfl:     psyllium 0.52 gram capsule, Take 3 capsules by mouth 3 (three) times daily as needed (constipation)., Disp: , Rfl:     silver sulfADIAZINE 1% (SILVADENE) 1 % cream, Apply to RLE skin breakdown twice daily (Patient taking differently: Apply topically 2 (two) times daily. Apply to RLE skin breakdown twice daily), Disp: , Rfl:     spironolactone (ALDACTONE) 25 MG tablet, Take 1 tablet (25 mg total) by mouth once daily., Disp: 90 tablet, Rfl: 3    triamcinolone acetonide 0.1% (KENALOG) 0.1 % ointment,  "Apply topically 2 (two) times daily. Use to affected areas for up to 2 weeks then take a 1 week break or decrease to 3 times weekly. Do not apply to groin or face. Apply to red scaly areas on the legs and arms, Disp: 454 g, Rfl: 1    vitamin E 400 UNIT capsule, Take 400 Units by mouth once daily., Disp: , Rfl:   Review of patient's allergies indicates:   Allergen Reactions    Opioids - morphine analogues Other (See Comments)     Bowel issues; bowel obstruction    Tizanidine Other (See Comments)     "Lips were numb,  Almost passed out."    Tramadol Hallucinations    Beta-blockers (beta-adrenergic blocking agts) Other (See Comments)     Can not go on beta blockers for long period of time - due to taking allergy injections    Morphine     Opioids-meperidine and related     Ciprofloxacin Rash       Ht 5' 5" (1.651 m)   Wt 75.8 kg (167 lb)   LMP  (LMP Unknown)   BMI 27.79 kg/m²     Review of Systems   Constitutional: Negative for chills and fever.   HENT:  Negative for congestion and sore throat.    Eyes:  Negative for discharge and double vision.   Cardiovascular:  Negative for chest pain, palpitations and syncope.   Respiratory:  Negative for cough and shortness of breath.    Endocrine: Negative for cold intolerance and heat intolerance.   Skin:  Negative for dry skin and rash.   Musculoskeletal:  Positive for joint pain, joint swelling and stiffness.   Gastrointestinal:  Negative for abdominal pain, nausea and vomiting.   Neurological:  Negative for focal weakness, numbness and paresthesias.         Objective:   Ortho Exam   Right hand:  Significant for dorsal incision.   Wound margins are well approximated and healing nicely.   No redness, warmth, drainage, or other signs of infection.   mild swelling.   ROM wrist and fingers full.     strength 5/5.    Neurovascularly intact    Left shoulder:   Shoulder with significantly decreased ROM  Pain with movement and palpation of the shoulder and upper arm  Large area " of ecchymosis spanning upper arm  Unable to assess strength due to pain  Neurovascularly intact    GEN: Well developed, well nourished. AAOX3. No acute distress.   Breathing unlabored.  Mood and affect pleasant.     Imaging:   I have personally reviewed the patient's x-ray of the left shoulder which demonstrates no acute fractures or dislocations, no arthritic changes, and a humeral krysten and screw due to an old humerus fracture    Assessment/Plan:     1. Infected cat bite of hand, right, initial encounter    2. Post-operative state    3. Cat scratch    4. Left shoulder pain    5. Left elbow pain    6. Contusion of left upper extremity, initial encounter      Wound care: sutures removed today in clinic. Regular wound care explained with soap and water. Patient encouraged to continue digit range-of-motion exercises as well as scar massage once wound fully heals.  Infection tx: previous abx completed. No more abx warranted at this time, as infection has resolved. Positive cx: pasturella multicida, bacteriodes pyogenes  Pain Management: continue current pain regimen  I explained symptoms will continue to resolve with time.  DME: Wean out of brace and start using hand for light activities.  Okay to continue wearing shoulder sling as needed for pain  Shoulder pain should continue to resolve with time the acute injury has time to heal.  May consider CSI in the future, as it is unclear the pain the patient is experiencing is due to possible frozen shoulder or acute soft tissue injury  Work status: WBAT  Follow up: in 3 weeks with Dr Moran    All of the patient's questions were answered and the patient will contact us if they have any questions or concerns in the interim.      Carol Sanchez PA-C  [facility]  Orthopedic Surgery

## 2024-02-22 NOTE — TELEPHONE ENCOUNTER
----- Message from Carissa Moore sent at 2/22/2024 10:15 AM CST -----  Contact: Vinod chamberlain/[facility]  [phone]  Vinod is calling in regards to getting an order for Occupational Therapy eval and a home health aide to help her bathe. Per Vinod, since the pt had the fall.          Thank you    
Verbal given for OT and HHA  
No

## 2024-02-23 ENCOUNTER — OFFICE VISIT (OUTPATIENT)
Dept: ORTHOPEDICS | Facility: CLINIC | Age: 89
End: 2024-02-23
Payer: MEDICARE

## 2024-02-23 ENCOUNTER — TELEPHONE (OUTPATIENT)
Dept: ORTHOPEDICS | Facility: CLINIC | Age: 89
End: 2024-02-23
Payer: MEDICARE

## 2024-02-23 ENCOUNTER — CLINICAL SUPPORT (OUTPATIENT)
Dept: AUDIOLOGY | Facility: CLINIC | Age: 89
End: 2024-02-23
Payer: MEDICARE

## 2024-02-23 VITALS — WEIGHT: 167 LBS | BODY MASS INDEX: 27.82 KG/M2 | HEIGHT: 65 IN

## 2024-02-23 DIAGNOSIS — M25.512 LEFT SHOULDER PAIN, UNSPECIFIED CHRONICITY: ICD-10-CM

## 2024-02-23 DIAGNOSIS — L08.9 INFECTED CAT BITE OF HAND, RIGHT, INITIAL ENCOUNTER: Primary | ICD-10-CM

## 2024-02-23 DIAGNOSIS — H90.3 ASYMMETRICAL SENSORINEURAL HEARING LOSS: Primary | ICD-10-CM

## 2024-02-23 DIAGNOSIS — Z98.890 POST-OPERATIVE STATE: ICD-10-CM

## 2024-02-23 DIAGNOSIS — S40.022A CONTUSION OF LEFT UPPER EXTREMITY, INITIAL ENCOUNTER: ICD-10-CM

## 2024-02-23 DIAGNOSIS — S46.012D TRAUMATIC TEAR OF LEFT ROTATOR CUFF, UNSPECIFIED TEAR EXTENT, SUBSEQUENT ENCOUNTER: ICD-10-CM

## 2024-02-23 DIAGNOSIS — S61.451A INFECTED CAT BITE OF HAND, RIGHT, INITIAL ENCOUNTER: Primary | ICD-10-CM
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
## 2024-02-23 DIAGNOSIS — M25.522 LEFT ELBOW PAIN: ICD-10-CM
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
## 2024-02-23 PROCEDURE — 1125F AMNT PAIN NOTED PAIN PRSNT: CPT | Mod: HCNC,CPTII,S$GLB,

## 2024-02-23 PROCEDURE — 1159F MED LIST DOCD IN RCRD: CPT | Mod: HCNC,CPTII,S$GLB,

## 2024-02-23 PROCEDURE — 92557 COMPREHENSIVE HEARING TEST: CPT | Mod: HCNC,S$GLB,, | Performed by: AUDIOLOGIST

## 2024-02-23 PROCEDURE — 1100F PTFALLS ASSESS-DOCD GE2>/YR: CPT | Mod: HCNC,CPTII,S$GLB,

## 2024-02-23 PROCEDURE — 92567 TYMPANOMETRY: CPT | Mod: HCNC,S$GLB,, | Performed by: AUDIOLOGIST

## 2024-02-23 PROCEDURE — 3288F FALL RISK ASSESSMENT DOCD: CPT | Mod: HCNC,CPTII,S$GLB,

## 2024-02-23 PROCEDURE — 99213 OFFICE O/P EST LOW 20 MIN: CPT | Mod: HCNC,S$GLB,,

## 2024-02-23 PROCEDURE — 1160F RVW MEDS BY RX/DR IN RCRD: CPT | Mod: HCNC,CPTII,S$GLB,

## 2024-02-23 PROCEDURE — 1111F DSCHRG MED/CURRENT MED MERGE: CPT | Mod: HCNC,CPTII,S$GLB,

## 2024-02-23 PROCEDURE — 99999 PR PBB SHADOW E&M-EST. PATIENT-LVL IV: CPT | Mod: PBBFAC,HCNC,,

## 2024-02-23 NOTE — PROGRESS NOTES
Hearing Aid Follow-up:    Ms. Abad was seen today to  her L&D replacement left hearing aid. Rachel replaced her left cshell as a courtesy. Ms. Abad would like to continue using a power dome with her right hearing aid instead of purchasing a new cshell for that ear. Ms. Abad's right hearing aid was cleaned and checked. Ms. Abad's hearing aids were connected to the phonak software and programmed to her most recent audiogram. She reported comfortable sound quality and improved clarity. She was shown how to remove the dome on the right hearing aid to change her filter, but will likely still require assistance. Ms. Abad will f/u in one month to confirm fit of her new cshell and to confirm HA settings. Will run real ear if able.      LEFT:    Rachel Estrada [id number]   Khadijah Gutierrez   SN: [id number]   Speaker: #1 UP   Service Warranty Exp: 06/11/26   One time courtesy replacement     cShell 4.0 Acrylic   SN: [id number] ACC: [id number]   Service Warranty Exp: 05/07/2024     RIGHT:    RT SN: [id number]   Warranty Exp:  06/11/2026   Small power dome

## 2024-02-23 NOTE — PROGRESS NOTES
Audiologic Evaluation 2/23/2024:       Jenniffer Jimenez, a 89 y.o. female, was seen today in the clinic for an audiologic evaluation for hearing loss.  Ms. Jimenez reported bilateral hearing loss that is worse in the left ear. Ms. Jimenez wears binaural GLYNN hearing aids and was also seen today for a hearing aid follow-up appointment. She denied otalgia, dizziness, and tinnitus.       Tympanometry revealed Type A tympanogram in the right ear and Type A tympanogram in the left ear. Audiogram results revealed moderate to severe sensorineural hearing loss in the right ear and severe to profound sensorineural hearing loss in the left ear.  Speech reception thresholds were noted at 60 dB in the right ear and 85 dB in the left ear.  Speech discrimination scores were 60% in the right ear and 24% in the left ear. Binaural speech discrimination was 76%.    Recommendations:  Otologic evaluation  Daily use of hearing aids  Annual audiogram  Hearing protection when in noise

## 2024-02-23 NOTE — TELEPHONE ENCOUNTER
Pt was seen today in clinic for post op of the right wrist . Pt was able to be seen for left arm injury , er f/u at appointment . Pt will have a follow up appointment with  on 03/11.    ----- Message from Tashi Maravilla sent at 2/23/2024  1:53 PM CST -----  Contact: JULIETTE  Type: Requesting to speak with nurse        Who Called: Juliette   Regarding: needing L arm clarification   Would the patient rather a call back or a response via [employer]? Call back  Best Call Back Number: [phone]  Additional Information:

## 2024-03-05 LAB — FUNGUS SPEC CULT: NORMAL

## 2024-03-07 NOTE — PROGRESS NOTES
Patient called 01/17/20 to karo appt 01/29/20 to 01/27/20.    [facility]'s [facility] and Ear, Nose, & Throat  Dr. Raj Webb, Dr. Jeison Ritchie, Dr. Blanca Crawford, Dr. Colton Rhodes,   RAFI Schaffer, ADILIA    1.  You were seen in the ENT Clinic today by RAFI Schaffer.   2.  Plan is to return to clinic with RAFI Schaffer in 6 months with an Audiogram    Thank you!  Lynnette Martins      Scheduling Information  Pediatric Appointment [phone]  ENT Surgery Coordinator (Alyse): [phone]  Imaging [phone]  Main  Services: [phone]    For urgent matters that arise during the evening, weekends, or holidays that cannot wait for normal business hours, please call [phone] and ask for the ENT Resident on-call to be paged.

## 2024-03-20 LAB
ACID FAST MOD KINY STN SPEC: NORMAL
MYCOBACTERIUM SPEC QL CULT: NORMAL

## 2024-03-25 ENCOUNTER — DOCUMENT SCAN (OUTPATIENT)
Dept: HOME HEALTH SERVICES | Facility: HOSPITAL | Age: 89
End: 2024-03-25
Payer: MEDICARE

## 2024-03-25 DIAGNOSIS — I10 ESSENTIAL HYPERTENSION: Chronic | ICD-10-CM

## 2024-03-25 DIAGNOSIS — I50.32 CHRONIC DIASTOLIC HEART FAILURE: ICD-10-CM

## 2024-03-25 RX ORDER — SPIRONOLACTONE 25 MG/1
25 TABLET ORAL 2 TIMES DAILY
Qty: 180 TABLET | Refills: 3 | Status: SHIPPED | OUTPATIENT
Start: 2024-03-25 | End: 2025-03-26

## 2024-03-31 NOTE — PT/OT/SLP PROGRESS
"Physical Therapy Treatment    Patient Name:  Jenniffer Jimenez   MRN:  [id number]  Admit Date: 4/27/2023  Admitting Diagnosis: Sepsis  Recent Surgeries:     General Precautions: Standard, fall, hearing impaired  Orthopedic Precautions: N/A  Braces: N/A    Recommendations:     Discharge Recommendations: assisted living facility  Level of Assistance Recommended at Discharge: 24 hours light assistance  Discharge Equipment Recommendations: none  Barriers to discharge: None    Assessment:     Jenniffer Jimenez is a 89 y.o. female admitted with a medical diagnosis of sepsis. Pt tolerated well, pt is pleasant, demo good effort, motivated however inc anxiety noted about fear of falling, inc time for reassurance she was safe, pt would continue to benefit from skilled PT services to improve overall functional mobility, strength and endurance.  .      Performance deficits affecting function: weakness, impaired endurance, impaired self care skills, impaired functional mobility, gait instability, impaired balance, decreased upper extremity function, decreased lower extremity function, decreased ROM, impaired cardiopulmonary response to activity.    Rehab Potential is good    Activity Tolerance: Fair    Plan:     Patient to be seen 5 x/week to address the above listed problems via gait training, therapeutic activities, therapeutic exercises, neuromuscular re-education, wheelchair management/training    Plan of Care Expires: 05/28/23  Plan of Care Reviewed with: patient    Subjective     1st attempt still in bed needing to get dressed, 2nd attempt "Slept good, I'm here for therapy, please come back don't forget me". "So scared of falling"     Pain/Comfort:  Pain Rating 1: 5/10  Location - Side 1: Right  Location - Orientation 1: generalized  Location 1: hip  Pain Addressed 1: Pre-medicate for activity, Reposition, Distraction, Cessation of Activity, Nurse notified  Pain Rating Post-Intervention 1: 0/10    Patient's cultural, " Avoid bubble baths.  Wear cotton underwear.  Thongs may cause migration of bacteria, avoid wearing if possible and don’t wear to bed.  If a culture was taken, you should be notified in 2-3 days of results.  Finish all antibiotics as prescribed to prevent resistance.  Drink at least 8 8oz. cups of fluids per day.  Drink cranberry juice 1-2 times per day.  Wipe from front to back when using toilet.  Urinate after intercourse.  If using pyridium, only take for a maximum of 2 days and may cause orange urine color.  May use Ibuprofen 200-600 mg every 6-8 hrs as needed for pain.  May use Acetaminophen 325-650mg every 4-6 hrs as needed for pain or fever.  If taking any contraceptives, use another form of birth control for 3 weeks after antibiotic is completed and may have break through bleeding due to decreased effectiveness of birth control pill when taken with antibiotics    Follow-up with your primary care provider or [facility] in 48 hrs if no improvement or worsening symptoms.  Go directly to ER for fevers above 101 or severe back or flank pain despite treatment.   spiritual, Cheondoism conflicts given the current situation:  yes    Objective:       Patient found in WC with  (on RA 02 sats at rest 96-97% HR 83-84, after gait  % HR 86-91 , per nsg keep on RA) upon PT entry to room.     Therapeutic Activities and Exercises: 2x10 reps AP,LAQ, mini elliptical x 10 min 1 rest break for fatigue    Functional Mobility:  Transfers:     Sit to Stand:  minimum assistance with rolling walker and vcs for safety/tech/handplacement  Bed to Chair: minimum assistance with  rolling walker  using  Stand Pivot  Gait: amb with RW min A wc follow ~ 20 ft to include curb step and 40 ft and 50 ft seated rest break, slow marco appears apprehensive/fearful with mobility  Balance: CGA with BUE support ~ 1 min while nursing applied cream to R hip    AM-PAC 6 CLICK MOBILITY  16    Patient left up in chair with call button in reach and belongings in reach BLE elev .    GOALS:   Multidisciplinary Problems       Physical Therapy Goals          Problem: Physical Therapy    Goal Priority Disciplines Outcome Goal Variances Interventions   Physical Therapy Goal     PT, PT/OT Ongoing, Progressing     Description: Goals to be met by: 5/28/23     Patient will increase functional independence with mobility by performing:    . Supine to sit with Set-up [place]  . Sit to supine with Set-up [place]  . Rolling to Left and Right with Modified [place].  . Sit to stand transfer with Supervision  . Bed to chair transfer with Supervision using Rolling Walker  . Gait  x 150 feet with Stand-by Assistance using Rolling Walker.   . Wheelchair propulsion x150 feet with Stand-by Assistance using bilateral uppper extremities  . Ascend/Descend 4 inch curb step with Contact Guard Assistance using Rolling Walker.                         Time Tracking:     PT Received On: 04/29/23  PT Start Time: 0928  PT Stop Time: 1022  PT Total Time (min): 54 min    Billable Minutes: Gait Training 23, Therapeutic Activity 19,  and Therapeutic Exercise 12    Treatment Type: Treatment  PT/PTA: PTA     Number of PTA visits since last PT visit: 1 04/29/2023

## 2024-04-02 ENCOUNTER — TELEPHONE (OUTPATIENT)
Dept: SPORTS MEDICINE | Facility: CLINIC | Age: 89
End: 2024-04-02
Payer: MEDICARE

## 2024-04-02 NOTE — TELEPHONE ENCOUNTER
Called pt to see if she would like to return to Dr. Martha Wren since she has been seen by her for the L shoulder previously. Pt wishes to return to Dr. Wren - pt is scheduled for 5/1.    Jayashree Beverly MS, OTC  Clinical Assistant to Dr. Tatianna Chapman

## 2024-04-03 ENCOUNTER — TELEPHONE (OUTPATIENT)
Dept: AUDIOLOGY | Facility: CLINIC | Age: 89
End: 2024-04-03
Payer: MEDICARE

## 2024-04-05 ENCOUNTER — DOCUMENT SCAN (OUTPATIENT)
Dept: HOME HEALTH SERVICES | Facility: HOSPITAL | Age: 89
End: 2024-04-05
Payer: MEDICARE

## 2024-04-05 ENCOUNTER — EXTERNAL HOME HEALTH (OUTPATIENT)
Dept: HOME HEALTH SERVICES | Facility: HOSPITAL | Age: 89
End: 2024-04-05
Payer: MEDICARE

## 2024-04-08 ENCOUNTER — HOSPITAL ENCOUNTER (EMERGENCY)
Facility: HOSPITAL | Age: 89
Discharge: HOME | End: 2024-04-09
Attending: STUDENT IN AN ORGANIZED HEALTH CARE EDUCATION/TRAINING PROGRAM
Payer: MEDICARE

## 2024-04-08 ENCOUNTER — NURSE TRIAGE (OUTPATIENT)
Dept: ADMINISTRATIVE | Facility: CLINIC | Age: 89
End: 2024-04-08
Payer: MEDICARE

## 2024-04-08 DIAGNOSIS — K59.00 CONSTIPATION, UNSPECIFIED CONSTIPATION TYPE: Primary | ICD-10-CM

## 2024-04-08 DIAGNOSIS — N39.0 URINARY TRACT INFECTION WITH HEMATURIA, SITE UNSPECIFIED: ICD-10-CM

## 2024-04-08 DIAGNOSIS — R10.9 ABDOMINAL PAIN: ICD-10-CM

## 2024-04-08 DIAGNOSIS — R31.9 URINARY TRACT INFECTION WITH HEMATURIA, SITE UNSPECIFIED: ICD-10-CM

## 2024-04-08 DIAGNOSIS — N17.9 AKI (ACUTE KIDNEY INJURY): ICD-10-CM

## 2024-04-08 LAB
ALBUMIN SERPL BCP-MCNC: 4.3 G/DL (ref 3.5–5.2)
ALP SERPL-CCNC: 124 U/L (ref 55–135)
ALT SERPL W/O P-5'-P-CCNC: 14 U/L (ref 10–44)
ANION GAP SERPL CALC-SCNC: 16 MMOL/L (ref 8–16)
AST SERPL-CCNC: 21 U/L (ref 10–40)
BACTERIA #/AREA URNS HPF: ABNORMAL /HPF
BASOPHILS # BLD AUTO: 0.03 K/UL (ref 0–0.2)
BASOPHILS NFR BLD: 0.3 % (ref 0–1.9)
BILIRUB SERPL-MCNC: 0.8 MG/DL (ref 0.1–1)
BILIRUB UR QL STRIP: NEGATIVE
BUN SERPL-MCNC: 48 MG/DL (ref 10–30)
CALCIUM SERPL-MCNC: 9.8 MG/DL (ref 8.7–10.5)
CHLORIDE SERPL-SCNC: 97 MMOL/L (ref 95–110)
CLARITY UR: ABNORMAL
CO2 SERPL-SCNC: 22 MMOL/L (ref 23–29)
COLOR UR: YELLOW
CREAT SERPL-MCNC: 1.9 MG/DL (ref 0.5–1.4)
DIFFERENTIAL METHOD BLD: ABNORMAL
EOSINOPHIL # BLD AUTO: 0.1 K/UL (ref 0–0.5)
EOSINOPHIL NFR BLD: 0.5 % (ref 0–8)
ERYTHROCYTE [DISTWIDTH] IN BLOOD BY AUTOMATED COUNT: 15.7 % (ref 11.5–14.5)
EST. GFR  (NO RACE VARIABLE): 24 ML/MIN/1.73 M^2
GLUCOSE SERPL-MCNC: 158 MG/DL (ref 70–110)
GLUCOSE UR QL STRIP: NEGATIVE
HCT VFR BLD AUTO: 33.2 % (ref 37–48.5)
HGB BLD-MCNC: 11.4 G/DL (ref 12–16)
HGB UR QL STRIP: ABNORMAL
HYALINE CASTS #/AREA URNS LPF: 0 /LPF
IMM GRANULOCYTES # BLD AUTO: 0.1 K/UL (ref 0–0.04)
IMM GRANULOCYTES NFR BLD AUTO: 1.1 % (ref 0–0.5)
KETONES UR QL STRIP: ABNORMAL
LEUKOCYTE ESTERASE UR QL STRIP: ABNORMAL
LIPASE SERPL-CCNC: 27 U/L (ref 4–60)
LYMPHOCYTES # BLD AUTO: 0.6 K/UL (ref 1–4.8)
LYMPHOCYTES NFR BLD: 6.5 % (ref 18–48)
MCH RBC QN AUTO: 30.4 PG (ref 27–31)
MCHC RBC AUTO-ENTMCNC: 34.3 G/DL (ref 32–36)
MCV RBC AUTO: 89 FL (ref 82–98)
MICROSCOPIC COMMENT: ABNORMAL
MONOCYTES # BLD AUTO: 0.7 K/UL (ref 0.3–1)
MONOCYTES NFR BLD: 6.9 % (ref 4–15)
NEUTROPHILS # BLD AUTO: 8 K/UL (ref 1.8–7.7)
NEUTROPHILS NFR BLD: 84.7 % (ref 38–73)
NITRITE UR QL STRIP: NEGATIVE
NON-SQ EPI CELLS #/AREA URNS HPF: 1 /HPF
NRBC BLD-RTO: 0 /100 WBC
PH UR STRIP: 6 [PH] (ref 5–8)
PLATELET # BLD AUTO: 181 K/UL (ref 150–450)
PMV BLD AUTO: 10.5 FL (ref 9.2–12.9)
POTASSIUM SERPL-SCNC: 5.2 MMOL/L (ref 3.5–5.1)
PROT SERPL-MCNC: 7.5 G/DL (ref 6–8.4)
PROT UR QL STRIP: ABNORMAL
RBC # BLD AUTO: 3.75 M/UL (ref 4–5.4)
RBC #/AREA URNS HPF: 4 /HPF (ref 0–4)
SODIUM SERPL-SCNC: 135 MMOL/L (ref 136–145)
SP GR UR STRIP: 1.02 (ref 1–1.03)
SQUAMOUS #/AREA URNS HPF: 1 /HPF
UNIDENT CRYS URNS QL MICRO: 1
URN SPEC COLLECT METH UR: ABNORMAL
UROBILINOGEN UR STRIP-ACNC: NEGATIVE EU/DL
WBC # BLD AUTO: 9.5 K/UL (ref 3.9–12.7)
WBC #/AREA URNS HPF: >100 /HPF (ref 0–5)

## 2024-04-08 PROCEDURE — 87077 CULTURE AEROBIC IDENTIFY: CPT | Mod: HCNC

## 2024-04-08 PROCEDURE — 87086 URINE CULTURE/COLONY COUNT: CPT | Mod: HCNC

## 2024-04-08 PROCEDURE — 85025 COMPLETE CBC W/AUTO DIFF WBC: CPT | Mod: HCNC

## 2024-04-08 PROCEDURE — 87088 URINE BACTERIA CULTURE: CPT | Mod: HCNC

## 2024-04-08 PROCEDURE — 80053 COMPREHEN METABOLIC PANEL: CPT | Mod: HCNC

## 2024-04-08 PROCEDURE — 83690 ASSAY OF LIPASE: CPT | Mod: HCNC

## 2024-04-08 PROCEDURE — 93010 ELECTROCARDIOGRAM REPORT: CPT | Mod: HCNC,,, | Performed by: INTERNAL MEDICINE

## 2024-04-08 PROCEDURE — 93005 ELECTROCARDIOGRAM TRACING: CPT | Mod: HCNC

## 2024-04-08 PROCEDURE — 99285 EMERGENCY DEPT VISIT HI MDM: CPT | Mod: 25,HCNC

## 2024-04-08 PROCEDURE — 96360 HYDRATION IV INFUSION INIT: CPT | Mod: HCNC

## 2024-04-08 PROCEDURE — 87186 SC STD MICRODIL/AGAR DIL: CPT | Mod: HCNC

## 2024-04-08 PROCEDURE — 25000003 PHARM REV CODE 250: Mod: HCNC | Performed by: STUDENT IN AN ORGANIZED HEALTH CARE EDUCATION/TRAINING PROGRAM

## 2024-04-08 PROCEDURE — 81000 URINALYSIS NONAUTO W/SCOPE: CPT | Mod: HCNC

## 2024-04-08 RX ORDER — PSEUDOEPHEDRINE/ACETAMINOPHEN 30MG-500MG
100 TABLET ORAL
Status: COMPLETED | OUTPATIENT
Start: 2024-04-08 | End: 2024-04-08

## 2024-04-08 RX ORDER — SYRING-NEEDL,DISP,INSUL,0.3 ML 29 G X1/2"
296 SYRINGE, EMPTY DISPOSABLE MISCELLANEOUS
Status: COMPLETED | OUTPATIENT
Start: 2024-04-08 | End: 2024-04-08

## 2024-04-08 RX ADMIN — SODIUM CHLORIDE 500 ML: 0.9 INJECTION, SOLUTION INTRAVENOUS at 11:04

## 2024-04-08 RX ADMIN — SODIUM CHLORIDE 500 ML: 9 INJECTION, SOLUTION INTRAVENOUS at 10:04

## 2024-04-08 RX ADMIN — Medication 100 ML: at 11:04

## 2024-04-08 RX ADMIN — MAGNESIUM CITRATE 296 ML: 1.75 LIQUID ORAL at 11:04

## 2024-04-09 VITALS
BODY MASS INDEX: 27.22 KG/M2 | DIASTOLIC BLOOD PRESSURE: 57 MMHG | OXYGEN SATURATION: 97 % | TEMPERATURE: 98 F | WEIGHT: 163.38 LBS | RESPIRATION RATE: 20 BRPM | HEART RATE: 90 BPM | SYSTOLIC BLOOD PRESSURE: 113 MMHG | HEIGHT: 65 IN

## 2024-04-09 LAB
OHS QRS DURATION: 72 MS
OHS QRS DURATION: 74 MS
OHS QTC CALCULATION: 455 MS
OHS QTC CALCULATION: 464 MS

## 2024-04-09 PROCEDURE — 25000003 PHARM REV CODE 250: Mod: HCNC | Performed by: STUDENT IN AN ORGANIZED HEALTH CARE EDUCATION/TRAINING PROGRAM

## 2024-04-09 RX ORDER — ONDANSETRON 8 MG/1
8 TABLET, ORALLY DISINTEGRATING ORAL 3 TIMES DAILY PRN
Qty: 20 TABLET | Refills: 0 | Status: SHIPPED | OUTPATIENT
Start: 2024-04-09

## 2024-04-09 RX ORDER — CEPHALEXIN 500 MG/1
500 CAPSULE ORAL EVERY 12 HOURS
Qty: 14 CAPSULE | Refills: 0 | Status: SHIPPED | OUTPATIENT
Start: 2024-04-09 | End: 2024-04-16

## 2024-04-09 RX ORDER — POLYETHYLENE GLYCOL 3350 17 G/17G
17 POWDER, FOR SOLUTION ORAL DAILY
Qty: 510 G | Refills: 0 | Status: SHIPPED | OUTPATIENT
Start: 2024-04-09 | End: 2024-05-09

## 2024-04-09 RX ORDER — ONDANSETRON 4 MG/1
4 TABLET, ORALLY DISINTEGRATING ORAL
Status: COMPLETED | OUTPATIENT
Start: 2024-04-09 | End: 2024-04-09

## 2024-04-09 RX ADMIN — ONDANSETRON 4 MG: 4 TABLET, ORALLY DISINTEGRATING ORAL at 01:04

## 2024-04-09 NOTE — TELEPHONE ENCOUNTER
No Contact/Triage. Duplicate Case.       Reason for Disposition   Caller has already spoken with another triager and has no further questions.    Additional Information   Negative: Caller is angry or rude (e.g., hangs up, verbally abusive, yelling)   Negative: Caller hangs up   Negative: Caller has already spoken with the PCP and has no further questions.    Protocols used: No Contact or Duplicate Contact Call-A-

## 2024-04-09 NOTE — ED PROVIDER NOTES
ED Provider Note - 4/8/2024    History     Chief Complaint   Patient presents with    Abdominal Pain     Pt reports abdominal pain with nausea that began at about noon. Pt had one episode of emesis just prior to arrival after taking tums. Pt states she feels the urge to have bowel movement but can't go. Pt states she did have a bowel movement earlier today but it was hard. Pt describes abdominal pain as shooting pains. Pt has hx of afib. Pt aao x 4. Pt states she has been thinking of calling a doctor to have her urine tested.        HPI     Jenniffer Jimenez is a 89 y.o. year old female with past medical and surgical history as seen below, presenting with chief complaint of generalized abdominal pain with associated nausea and vomiting.  One episode of vomiting just prior to coming to emergency department after taking Tums.  Has had urge to have bowel movements but has difficulty and straining when she does go.  Had small bowel movement that was very hard today and yesterday.  Take psyllium husk at home without other laxatives.        Past Medical History:   Diagnosis Date    Amblyopia of left eye 04/10/2013    Arthritis     Facet arthropathy, Lumbosacral    Atherosclerosis of aorta     Cataract     Central retinal vein occlusion of left eye     CKD (chronic kidney disease) stage 3, GFR 30-59 ml/min     Diabetes mellitus, type 2     Diabetic polyneuropathy 01/06/2022    Exotropia of both eyes 02/06/2013    recession RSR 5.0mm w/ adj; recession LR os 5.0 w/ adj; resect MR os  4.0mm    Hearing loss     History of resection of small bowel     Hypertensive retinopathy of both eyes     Hypoglycemia     Macular degeneration     OA (osteoarthritis) of shoulder     Right    Osteoporosis     Posterior vitreous detachment of both eyes     Psychiatric problem     Rhinitis     TIA (transient ischemic attack)      Past Surgical History:   Procedure Laterality Date    APPENDECTOMY      CARDIAC CATHETERIZATION      CATARACT  EXTRACTION W/  INTRAOCULAR LENS IMPLANT Bilateral      SECTION, CLASSIC      CLOSURE OF LEFT ATRIAL APPENDAGE USING DEVICE N/A 2020    Procedure: Left atrial appendage closure device;  Surgeon: Abundio Curtis MD;  Location: [facility] CATH LAB;  Service: Cardiology;  Laterality: N/A;    HYSTERECTOMY      INNER EAR SURGERY      IRRIGATION AND DEBRIDEMENT OF UPPER EXTREMITY Right 2024    Procedure: IRRIGATION AND DEBRIDEMENT, UPPER EXTREMITY;  Surgeon: Con Moran Jr., MD;  Location: [facility] OR;  Service: Orthopedics;  Laterality: Right;    JOINT REPLACEMENT      LEFT KNEE REPLACEMENT IN 2013 -    OOPHORECTOMY      SINUS SURGERY      STRABISMUS SURGERY  13    RSR recession 5 mm, LLR recession 5 mm and LMR resection 4mm    STRABISMUS SURGERY  2014    recess LR OD 6mm    TONSILLECTOMY      watchman surgery N/A 2020         Family History   Problem Relation Age of Onset    Hypertension Mother     Hypertension Father     Liver disease Sister     Diabetes Sister     Heart disease Sister     Diabetes Brother     Breast cancer Maternal Aunt     No Known Problems Maternal Uncle     No Known Problems Paternal Aunt     No Known Problems Paternal Uncle     No Known Problems Maternal Grandmother     No Known Problems Maternal Grandfather     No Known Problems Paternal Grandmother     No Known Problems Paternal Grandfather     No Known Problems Daughter     No Known Problems Son     Heart disease Sister     No Known Problems Son     No Known Problems Son     Cancer Neg Hx         in first degree relatives    Melanoma Neg Hx     Psoriasis Neg Hx     Lupus Neg Hx     Amblyopia Neg Hx     Blindness Neg Hx     Cataracts Neg Hx     Glaucoma Neg Hx     Macular degeneration Neg Hx     Retinal detachment Neg Hx     Strabismus Neg Hx     Stroke Neg Hx     Thyroid disease Neg Hx      Social History     Tobacco Use    Smoking status: Former     Current packs/day: 0.00     Types: Cigarettes     Quit date:  "10/29/1982     Years since quittin.4     Passive exposure: Never    Smokeless tobacco: Never   Substance Use Topics    Alcohol use: Not Currently     Alcohol/week: 2.0 standard drinks of alcohol     Types: 2 Shots of liquor per week     Comment: rare    Drug use: No     Social Determinants of Health with Concerns     Tobacco Use: Medium Risk (2024)    Patient History     Smoking Tobacco Use: Former     Smokeless Tobacco Use: Never     Passive Exposure: Never   Physical Activity: Inactive (2024)    Exercise Vital Sign     Days of Exercise per Week: 0 days     Minutes of Exercise per Session: 0 min   Stress: Stress Concern Present (2024)    [place] [employer] - Occupational Stress Questionnaire     Feeling of Stress : To some extent   Social Connections: Unknown (2024)    Social Connection and Isolation Panel [NHANES]     Frequency of Communication with Friends and Family: More than three times a week     Frequency of Social Gatherings with Friends and Family: More than three times a week     Attends Gnosticist Services: Not on file     Active Member of Clubs or Organizations: Not on file     Attends Club or Organization Meetings: Not on file     Marital Status:       Review of patient's allergies indicates:   Allergen Reactions    Opioids - morphine analogues Other (See Comments)     Bowel issues; bowel obstruction    Tizanidine Other (See Comments)     "Lips were numb,  Almost passed out."    Tramadol Hallucinations    Beta-blockers (beta-adrenergic blocking agts) Other (See Comments)     Can not go on beta blockers for long period of time - due to taking allergy injections    Morphine     Opioids-meperidine and related     Ciprofloxacin Rash       Review of Systems     A full Review of Systems (ROS) was performed and was negative unless otherwise stated in the HPI.      Physical Exam     Vitals:    24 2121 24 0102 24 0151   BP: (!) 154/63 (!) " "113/57 (!) 113/57   Pulse: 80  90   Resp: 20  20   Temp: 98 °F (36.7 °C)  98 °F (36.7 °C)   TempSrc: Oral     SpO2: 96%  97%   Weight: 74.1 kg (163 lb 6.4 oz)     Height: 5' 5" (1.651 m)          Physical Exam    Nursing note and vitals reviewed.  Constitutional: She appears well-developed and well-nourished. No distress.   HENT:   Head: Normocephalic and atraumatic.   Right Ear: External ear normal.   Left Ear: External ear normal.   Nose: Nose normal.   Mouth/Throat: Oropharynx is clear and moist.   Eyes: Conjunctivae and EOM are normal. Pupils are equal, round, and reactive to light.   Neck: Neck supple.   Normal range of motion.  Cardiovascular:  Normal rate, regular rhythm, normal heart sounds and intact distal pulses.           Pulmonary/Chest: Breath sounds normal. No stridor. No respiratory distress. She has no wheezes. She has no rhonchi. She has no rales.   Abdominal: Abdomen is soft. Bowel sounds are normal. She exhibits distension. There is abdominal tenderness (Generalized). There is no rebound and no guarding.   Musculoskeletal:         General: No tenderness or edema. Normal range of motion.      Cervical back: Normal range of motion and neck supple.     Neurological: She is alert and oriented to person, place, and time. She has normal strength. No cranial nerve deficit or sensory deficit.   Skin: Skin is warm and dry. No rash noted.   Psychiatric: She has a normal mood and affect. Thought content normal.         Lab Results- Independently reviewed by myself      Labs Reviewed   CBC W/ AUTO DIFFERENTIAL - Abnormal; Notable for the following components:       Result Value    RBC 3.75 (*)     Hemoglobin 11.4 (*)     Hematocrit 33.2 (*)     RDW 15.7 (*)     Immature Granulocytes 1.1 (*)     Gran # (ANC) 8.0 (*)     Immature Grans (Abs) 0.10 (*)     Lymph # 0.6 (*)     Gran % 84.7 (*)     Lymph % 6.5 (*)     All other components within normal limits   COMPREHENSIVE METABOLIC PANEL - Abnormal; Notable for " the following components:    Sodium 135 (*)     Potassium 5.2 (*)     CO2 22 (*)     Glucose 158 (*)     BUN 48 (*)     Creatinine 1.9 (*)     eGFR 24 (*)     All other components within normal limits   URINALYSIS, REFLEX TO URINE CULTURE - Abnormal; Notable for the following components:    Appearance, UA Hazy (*)     Protein, UA 1+ (*)     Ketones, UA Trace (*)     Occult Blood UA 1+ (*)     Leukocytes, UA 3+ (*)     All other components within normal limits    Narrative:     Specimen Source->Urine   URINALYSIS MICROSCOPIC - Abnormal; Notable for the following components:    WBC, UA >100 (*)     Non-Squam Epith 1 (*)     All other components within normal limits    Narrative:     Specimen Source->Urine   CULTURE, URINE   LIPASE           Imaging     Imaging Results              CT Abdomen Pelvis  Without Contrast (Final result)  Result time 04/08/24 22:45:06      Final result by Alcon Blanc DO (04/08/24 22:45:06)                   Impression:      1. Mildly prominent loops of small bowel with multiple air-fluid levels without transition point seen.  Findings are concerning for ileus or low-grade partial obstruction.  2. Large volume of stool, correlate for constipation.  3. Urinary bladder wall thickening and perivesicular fat stranding suspicious for cystitis.      Electronically signed by: Alcon Blanc  Date:    04/08/2024  Time:    22:45               Narrative:    EXAMINATION:  CT ABDOMEN PELVIS WITHOUT CONTRAST    CLINICAL HISTORY:  Abdominal pain, acute, nonlocalized;Nausea/vomiting;    TECHNIQUE:  Multiplanar images were obtained of the abdomen and pelvis from the hemidiaphragms through the symphysis pubis without intravenous contrast.    COMPARISON:  CT of the abdomen and pelvis from 09/01/2017.    FINDINGS:  Lung Bases: Minimal atelectasis in the left lung base.  The lung bases are otherwise clear.    Heart: Heart size is normal.  No pericardial effusion.    Liver: Normal in size and attenuation  without focal hepatic lesion.    Biliary tract: No intrahepatic or extrahepatic biliary ductal dilatation.    Gallbladder: The gallbladder is absent.    Pancreas: Normal. No pancreatic ductal dilatation.    Spleen: There is a calcified granuloma in the spleen.    Adrenals: Normal.    Kidneys and urinary collecting systems: Normal.  No hydronephrosis or urolithiasis.    Lymph nodes: None enlarged.    Stomach and bowel: There is a small hiatal hernia.  The stomach is otherwise unremarkable.  There are mildly distended loops of small bowel with multiple air-fluid levels, concerning for low-grade partial obstruction or ileus.  No transition point.  No bowel wall thickening.  There is a large volume of stool.  The appendix is not definitively seen however there is no evidence of right lower quadrant inflammation.    Peritoneum and mesentery: No ascites or free intraperitoneal air. No abdominal fluid collection.    Vasculature: There is severe atherosclerosis.  There is no aneurysm.    Urinary bladder: There is urinary bladder wall thickening and perivesicular fat stranding.    Reproductive organs: The uterus is absent.    Body wall: No abnormality.    Musculoskeletal: No aggressive osseous lesion.  There are multilevel degenerative changes of the spine                                    X-Rays:   Independently Interpreted Readings:   Abdomen:   Abdomen and Pelvis CT with Contrast - Large volume stool burden      EKG Readings: (Independently Interpreted)   Initial Reading: No STEMI. Rhythm: Atrial Fibrillation. Heart Rate: 82. Ectopy: PVCs. Axis: Right Axis Deviation.           ED Course         Critical Care    Date/Time: 4/9/2024 4:58 AM    Performed by: Percy Avendaño MD  Authorized by: Percy Avendaño MD  Direct patient critical care time: 12 minutes  Additional history critical care time: 7 minutes  Ordering / reviewing critical care time: 7 minutes  Documentation critical care time: 8 minutes  Total critical care  time (exclusive of procedural time) : 34 minutes  Critical care time was exclusive of separately billable procedures and treating other patients and teaching time.  Critical care was necessary to treat or prevent imminent or life-threatening deterioration of the following conditions: renal failure.  Critical care was time spent personally by me on the following activities: blood draw for specimens, discussions with consultants, interpretation of cardiac output measurements, evaluation of patient's response to treatment, examination of patient, obtaining history from patient or surrogate, ordering and review of laboratory studies, ordering and performing treatments and interventions, ordering and review of radiographic studies, pulse oximetry, re-evaluation of patient's condition and review of old charts.               Orders Placed This Encounter    Urine culture    CT Abdomen Pelvis  Without Contrast    CBC auto differential    Comprehensive metabolic panel    Lipase    Urinalysis, Reflex to Urine Culture Urine, Clean Catch    Urinalysis Microscopic    Ambulatory referral/consult to Outpatient Case Management    EKG 12-lead    Possible Hospitalization    sodium chloride 0.9% bolus 500 mL 500 mL    AND Linked Order Group     glycerin 99.5% topical solution 100 mL     magnesium citrate solution 296 mL     sodium chloride 0.9% bolus 500 mL 500 mL    polyethylene glycol (GLYCOLAX) 17 gram/dose powder    ondansetron disintegrating tablet 4 mg    cephALEXin (KEFLEX) 500 MG capsule    ondansetron (ZOFRAN-ODT) 8 MG TbDL          ED Course as of 04/09/24 0457   Tue Apr 09, 2024   0044 Patient has had multiple large BM's with resolution of symptoms since enema. Given this, doubt high grade obstruction or ileus. Previously discussed findings and plan with daughter. Patient seems safe to be dc'd with f/u PCP. Advised continued fiber supplementation with addition of daily miralax to soften stools. Return precautions given for  "worsening or changing symptoms. [KB]      ED Course User Index  [KB] Percy Avendaño MD              Medical Decision Making       The patient's list of active medical problems, social history, medications, and allergies as documented per RN staff has been reviewed.     Comorbidities taken into consideration for development of diagnosis and treatment plan include DM, HTN, and CKD.      Medical Decision Making  Patient presents with generalized abdominal pain. Abdominal exam without peritoneal signs. No evidence of acute abdomen at this time. Well appearing.  Considered and doubt ovarian torsion given history and presentation.  No pain in pelvis that would be consistent with PID or TOA especially in setting of no vaginal discharge.  Given work up, low suspicion for acute hepatobiliary disease (including acute cholecystitis), acute pancreatitis (neg lipase), PUD and gastric perforation, acute infectious processes (pneumonia, hepatitis, pyelonephritis), acute appendicitis, vascular catastrophe, bowel obstruction or viscus perforation, diverticulitis.  Serial abdominal exam performed with soft, nontender, nondistended abdomen.  Presentation not consistent with other acute, emergent causes of abdominal pain at this time.  Patient's symptoms improved greatly at time of reassessment.      CT imaging of the abdomen/pelvis obtained showing significant stool burden with question of possible ileus versus partial small-bowel obstruction.  Patient overall appeared well.  Given findings of CT I considered admission but felt that if patient could have successful bowel movement this would not be necessary.  Given a "brown bomb" enema with several significant bowel movements and improvement of condition.    Patient appears safe and stable for discharge and outpatient follow-up.  Advised continued hydration and small volume, bland diet.  Return to ED precautions given for worsening or changing symptoms.      Amount and/or Complexity " of Data Reviewed  Independent Historian: caregiver  External Data Reviewed: notes.  Labs: ordered.     Details: CBC: No significant anemia, platelet disorder, or leukocytosis.  CMP: Normal electrolytes and liver enzymes.  JOSHUA based off creatinine elevation    Radiology: ordered and independent interpretation performed.  ECG/medicine tests: ordered and independent interpretation performed.    Risk  OTC drugs.  Prescription drug management.                    ED Prescriptions       Medication Sig Dispense Start Date End Date Auth. Provider    polyethylene glycol (GLYCOLAX) 17 gram/dose powder Take 17 g by mouth once daily. 510 g 4/9/2024 5/9/2024 Percy Avendaño MD    cephALEXin (KEFLEX) 500 MG capsule Take 1 capsule (500 mg total) by mouth every 12 (twelve) hours. for 7 days 14 capsule 4/9/2024 4/16/2024 Percy Avendaño MD    ondansetron (ZOFRAN-ODT) 8 MG TbDL Take 1 tablet (8 mg total) by mouth 3 (three) times daily as needed (nausea or vomiting). 20 tablet 4/9/2024 -- Percy Avendaño MD              Clinical Impression       Follow-up Information       Follow up With Specialties Details Why Contact Info    Reed Ruiz MD Internal Medicine Schedule an appointment as soon as possible for a visit in 3 days For follow-up on today's visit. [address] [facility] [address]  [phone]              Referrals:  Orders Placed This Encounter   Procedures    Ambulatory referral/consult to Outpatient Case Management     Referral Priority:   Routine     Referral Type:   Consultation     Referral Reason:   Specialty Services Required     Number of Visits Requested:   1       Disposition   ED Disposition Condition    Discharge Stable            Diagnosis    ICD-10-CM ICD-9-CM   1. Constipation, unspecified constipation type  K59.00 564.00   2. Abdominal pain  R10.9 789.00   3. JOSHUA (acute kidney injury)  N17.9 584.9   4. Urinary tract infection with hematuria, site unspecified  N39.0 599.0    R31.9 599.70            Percy Avendaño MD        04/09/2024          DISCLAIMER: This note was prepared with [email or web address] voice recognition transcription software. Garbled syntax, mangled pronouns, and other bizarre constructions may be attributed to that software system.       Percy Avendaño MD  04/09/24 2567

## 2024-04-09 NOTE — ED TRIAGE NOTES
Pt comes to er complaining of abdominal pain and nausea that started yesterday. Denies chest pain, dizziness, and headache. Reports that it is hard for her to have a bm. Last bm was yesterday but a very small amount. Reports that she took tums this morning.  Pt states that she does take medication for constipation. Abdomin appears distended and tender. Pt expresses pain upon palpation. Active bowel sounds x4. AAO x4. Transported to bathroom via wheelchair for urine sample. Daughter at bedside. Lungs clear. Radial and pedal pulses present.

## 2024-04-09 NOTE — TELEPHONE ENCOUNTER
"Jenniffer c/o "terrible pains in my stomach that began at 1200 today and pain has not stopped." +nausea. Denies fever. Advised pt per triage protocol to go to nearest ED now for physician nguyen. Instructed to call  now if no immediate . V/u.   Reason for Disposition   [1] SEVERE pain (e.g., excruciating) AND [2] present > 1 hour    Additional Information   Negative: Shock suspected (e.g., cold/pale/clammy skin, too weak to stand, low BP, rapid pulse)   Negative: Difficult to awaken or acting confused (e.g., disoriented, slurred speech)   Negative: Passed out (i.e., lost consciousness, collapsed and was not responding)   Negative: Sounds like a life-threatening emergency to the triager    Protocols used: Abdominal Pain - Female-A-AH    "

## 2024-04-09 NOTE — ED NOTES
Patient sleeping, resting comfortably in bed. Easily arouses. Reports nausea improved and pain improved. Ok to go home.

## 2024-04-10 ENCOUNTER — LAB VISIT (OUTPATIENT)
Dept: LAB | Facility: HOSPITAL | Age: 89
End: 2024-04-10
Attending: INTERNAL MEDICINE
Payer: MEDICARE

## 2024-04-10 ENCOUNTER — DOCUMENT SCAN (OUTPATIENT)
Dept: HOME HEALTH SERVICES | Facility: HOSPITAL | Age: 89
End: 2024-04-10
Payer: MEDICARE

## 2024-04-10 ENCOUNTER — OFFICE VISIT (OUTPATIENT)
Dept: INTERNAL MEDICINE | Facility: CLINIC | Age: 89
End: 2024-04-10
Payer: MEDICARE

## 2024-04-10 ENCOUNTER — TELEPHONE (OUTPATIENT)
Dept: INTERNAL MEDICINE | Facility: CLINIC | Age: 89
End: 2024-04-10
Payer: MEDICARE

## 2024-04-10 VITALS
RESPIRATION RATE: 12 BRPM | HEART RATE: 81 BPM | TEMPERATURE: 98 F | SYSTOLIC BLOOD PRESSURE: 126 MMHG | HEIGHT: 65 IN | OXYGEN SATURATION: 98 % | WEIGHT: 161 LBS | BODY MASS INDEX: 26.82 KG/M2 | DIASTOLIC BLOOD PRESSURE: 68 MMHG

## 2024-04-10 DIAGNOSIS — R10.9 ABDOMINAL PAIN, UNSPECIFIED ABDOMINAL LOCATION: ICD-10-CM

## 2024-04-10 DIAGNOSIS — R73.03 PREDIABETES: ICD-10-CM

## 2024-04-10 DIAGNOSIS — N17.9 AKI (ACUTE KIDNEY INJURY): ICD-10-CM

## 2024-04-10 DIAGNOSIS — M75.02 ADHESIVE CAPSULITIS OF LEFT SHOULDER: ICD-10-CM

## 2024-04-10 DIAGNOSIS — K59.00 CONSTIPATION, UNSPECIFIED CONSTIPATION TYPE: Primary | ICD-10-CM

## 2024-04-10 LAB
ALBUMIN SERPL BCP-MCNC: 3.9 G/DL (ref 3.5–5.2)
ALP SERPL-CCNC: 110 U/L (ref 55–135)
ALT SERPL W/O P-5'-P-CCNC: 12 U/L (ref 10–44)
ANION GAP SERPL CALC-SCNC: 10 MMOL/L (ref 8–16)
AST SERPL-CCNC: 15 U/L (ref 10–40)
BACTERIA UR CULT: ABNORMAL
BILIRUB SERPL-MCNC: 0.5 MG/DL (ref 0.1–1)
BUN SERPL-MCNC: 50 MG/DL (ref 10–30)
CALCIUM SERPL-MCNC: 9.5 MG/DL (ref 8.7–10.5)
CHLORIDE SERPL-SCNC: 102 MMOL/L (ref 95–110)
CO2 SERPL-SCNC: 25 MMOL/L (ref 23–29)
CREAT SERPL-MCNC: 1.8 MG/DL (ref 0.5–1.4)
EST. GFR  (NO RACE VARIABLE): 26.1 ML/MIN/1.73 M^2
ESTIMATED AVG GLUCOSE: 123 MG/DL (ref 68–131)
GLUCOSE SERPL-MCNC: 93 MG/DL (ref 70–110)
HBA1C MFR BLD: 5.9 % (ref 4–5.6)
POTASSIUM SERPL-SCNC: 5.5 MMOL/L (ref 3.5–5.1)
PROT SERPL-MCNC: 7 G/DL (ref 6–8.4)
SODIUM SERPL-SCNC: 137 MMOL/L (ref 136–145)

## 2024-04-10 PROCEDURE — 99214 OFFICE O/P EST MOD 30 MIN: CPT | Mod: HCNC,S$GLB,, | Performed by: INTERNAL MEDICINE

## 2024-04-10 PROCEDURE — 1126F AMNT PAIN NOTED NONE PRSNT: CPT | Mod: HCNC,CPTII,S$GLB, | Performed by: INTERNAL MEDICINE

## 2024-04-10 PROCEDURE — 99999 PR PBB SHADOW E&M-EST. PATIENT-LVL IV: CPT | Mod: PBBFAC,HCNC,, | Performed by: INTERNAL MEDICINE

## 2024-04-10 PROCEDURE — 83036 HEMOGLOBIN GLYCOSYLATED A1C: CPT | Mod: HCNC | Performed by: INTERNAL MEDICINE

## 2024-04-10 PROCEDURE — 1159F MED LIST DOCD IN RCRD: CPT | Mod: HCNC,CPTII,S$GLB, | Performed by: INTERNAL MEDICINE

## 2024-04-10 PROCEDURE — 1160F RVW MEDS BY RX/DR IN RCRD: CPT | Mod: HCNC,CPTII,S$GLB, | Performed by: INTERNAL MEDICINE

## 2024-04-10 PROCEDURE — 80053 COMPREHEN METABOLIC PANEL: CPT | Mod: HCNC | Performed by: INTERNAL MEDICINE

## 2024-04-10 PROCEDURE — 36415 COLL VENOUS BLD VENIPUNCTURE: CPT | Mod: HCNC,PO | Performed by: INTERNAL MEDICINE

## 2024-04-10 NOTE — PROGRESS NOTES
Subjective:       Patient ID: Jenniffer Jimenez is a 89 y.o. female.    Chief Complaint: Follow-up (ER/)    HPI    CC: ED visit -     89 y.o.female presents today in f/u to ED visit  ED dx:  Abdominal pain, constipation, JOSHUA, UTI  Presenting sx: She went to the ER for abdominal pain.  She is still having abdominal pain since the enema.  She is still hurting.  She has not had any BMs since the ER visit.  She was given information about dialysis.  She was told she was taking too much psyllium husks.    Tests done:   Lab:  CMP with stable kidney function, slight change in electrolytes, lipase within normal limits, CBC stable, urine culture with Gram-negative rods, urinalysis with UTI   Imaging: EKG with atrial fibrillation, CT abdomen pelvis with mildly prominent loops of bowel with multiple air-fluid levels without transition point seen.  Concerning for ileus or low-grade partial obstruction.  Large volume stool.  Urinary bladder wall thickening and perivascular fat stranding suspicious for cystitis.  Disposition: home with antibiotics and laxatives.    Post ED: She has a BM about three times a day, but none since ER visit.  Not a lot is coming out.  She was still having the movement.  She was given miralax when she left the ER.  She started to take this at night.  She had one glass of miralax. Her daughter got it last night.  She has done suppositories without relief.  In the long run, she eventually has a BM.    Her left shoulder does not have as much range of motion as her right shoulder.  She is seeing Dr. Wren.    Review of Systems      Objective:      Physical Exam  Vitals reviewed.   Constitutional:       Appearance: She is well-developed.   HENT:      Head: Normocephalic and atraumatic.      Mouth/Throat:      Pharynx: No oropharyngeal exudate.   Eyes:      General: No scleral icterus.        Right eye: No discharge.         Left eye: No discharge.      Pupils: Pupils are equal, round, and reactive to  light.   Neck:      Thyroid: No thyromegaly.      Trachea: No tracheal deviation.   Cardiovascular:      Rate and Rhythm: Normal rate and regular rhythm.      Heart sounds: Normal heart sounds. No murmur heard.     No friction rub. No gallop.   Pulmonary:      Effort: Pulmonary effort is normal. No respiratory distress.      Breath sounds: Normal breath sounds. No wheezing or rales.   Chest:      Chest wall: No tenderness.   Abdominal:      General: Bowel sounds are normal. There is no distension.      Palpations: Abdomen is soft. There is no mass.      Tenderness: There is no abdominal tenderness. There is no guarding or rebound.   Musculoskeletal:         General: No tenderness.      Right shoulder: Normal.      Left shoulder: Decreased range of motion (reduced rom).      Cervical back: Normal range of motion and neck supple.   Skin:     General: Skin is warm and dry.      Coloration: Skin is not pale.      Findings: No erythema or rash.   Neurological:      Mental Status: She is alert and oriented to person, place, and time.   Psychiatric:         Behavior: Behavior normal.         Assessment:       1. Constipation, unspecified constipation type    2. Abdominal pain, unspecified abdominal location    3. Prediabetes  - Hemoglobin A1C; Future    4. JOSHUA (acute kidney injury)  - Comprehensive Metabolic Panel; Future    5. Adhesive capsulitis of left shoulder      Plan:       1/2. Continue MiraLax.  Take twice daily.  Recommend patient get Dulcolax suppositories as well.  If no improvement, may need to return to ER, because patient states unable to give herself enemas.  3. Check A1c.  4.  Check CMP.    5. Follow-up with orthopedics.

## 2024-04-11 ENCOUNTER — TELEPHONE (OUTPATIENT)
Dept: INTERNAL MEDICINE | Facility: CLINIC | Age: 89
End: 2024-04-11
Payer: MEDICARE

## 2024-04-11 ENCOUNTER — CLINICAL SUPPORT (OUTPATIENT)
Dept: AUDIOLOGY | Facility: CLINIC | Age: 89
End: 2024-04-11
Payer: MEDICARE

## 2024-04-11 DIAGNOSIS — H90.3 ASYMMETRICAL SENSORINEURAL HEARING LOSS: Primary | ICD-10-CM

## 2024-04-11 DIAGNOSIS — K59.09 CHRONIC CONSTIPATION: Primary | ICD-10-CM

## 2024-04-11 DIAGNOSIS — N17.9 AKI (ACUTE KIDNEY INJURY): Primary | ICD-10-CM

## 2024-04-11 PROCEDURE — 99499 UNLISTED E&M SERVICE: CPT | Mod: HCNC,S$GLB,, | Performed by: AUDIOLOGIST

## 2024-04-11 NOTE — TELEPHONE ENCOUNTER
----- Message from Yuval Jordan sent at 4/11/2024  7:12 AM CDT -----  Contact: Pt [phone]  Consult    She took Miralax and 2 suppository laxatives when she got home yesterday and nothing happened. Then she took 2 more suppositories late that night. She still haven't had a bowel movement    Thank you

## 2024-04-11 NOTE — PROGRESS NOTES
A1c indicates an increased risk of diabetes.  Regular exercise 5 days a week, 30 minute a day will help as well as Mediterranean diet.  Your electrolytes, liver function are normal.  Kidney function is stable from the emergency room, but lower than prior checks.  Please drink plenty of fluid, and let us recheck Monday or Tuesday of next week.

## 2024-04-11 NOTE — TELEPHONE ENCOUNTER
Called patient. Informed her of note from yesterday. Patient staed that she's been having constipation for years, and something is not right. The only thing that has helped her have a bowel movement in the past is the liquid they give you before a colonoscopy mixed with psyllium husk. I advised the patient that if she felt she needed immediate care she should go to the emergency room or urgent care.    Patient is requesting an urgent referral with GI. She wants to be seen in the next few days. Patient did not request to be seen by us.

## 2024-04-11 NOTE — TELEPHONE ENCOUNTER
----- Message from Kathleen Barakat sent at 4/11/2024 11:24 AM CDT -----  Contact: [phone]  Type: Returning a call    Who left a message?Ethan Winn MA    When did the practice call? Today at 9:00 am     Comments: Patient is requesting a call from Ms. Langford

## 2024-04-11 NOTE — TELEPHONE ENCOUNTER
This is okay as long as patient does not have increasing abdominal pain.  If patient has increasing abdominal pain, she needs to go back to the emergency room.

## 2024-04-11 NOTE — PROGRESS NOTES
Hearing Aid Follow-up:  Ms. Abad was seen today for a hearing aid follow-up appointment. She reported she is unable to insert her left cshell. Ms. Abad has limited mobility of her left arm. She also feels it is bulky and doesn't go deep enough into her ear canal. Ms. Abad's hearing aids were cleaned and checked. A listening check confirmed both hearing aids to be in good working condition. We reviewed how to remove the dome and change the filter of  the right hearing aid. The hearing aids were connected to the Deed fitting software and a firmware update was performed. Overall gain was increased in the right hearing aid. Ms. Abad's left c shell was modified and the canal lock was removed. We practiced proper insertion/removal. She was having trouble orienting the correct direction of the cshell in her EAC when using her right arm, so I recommended she place the over the ear portion before inserting the cshell. Ms. Abad was able to successfully insert the left hearing aid. A remake order was placed for the left c shell to make it less bulky and insert more deeply into the ear canal. Ms. Abad reported she is hearing much better. She will return for f/u to be fit with her new cshell in 2 weeks.       Hearing Aid Information:  LEFT:     Rachel Estrada [id number]   Khadijah Gutierrez   SN: [id number]   Speaker: #1 UP   Service Warranty Exp: 06/11/26   One time courtesy replacement     cShell 4.0 Acrylic   SN: [id number] ACC: [id number]   Service Warranty Exp: 05/07/2024      RIGHT:     RT SN: [id number]   Warranty Exp:  06/11/2026   Small power dome

## 2024-04-12 ENCOUNTER — OUTPATIENT CASE MANAGEMENT (OUTPATIENT)
Dept: ADMINISTRATIVE | Facility: OTHER | Age: 89
End: 2024-04-12
Payer: MEDICARE

## 2024-04-15 ENCOUNTER — OUTPATIENT CASE MANAGEMENT (OUTPATIENT)
Dept: ADMINISTRATIVE | Facility: OTHER | Age: 89
End: 2024-04-15
Payer: MEDICARE

## 2024-04-15 ENCOUNTER — PATIENT OUTREACH (OUTPATIENT)
Dept: ADMINISTRATIVE | Facility: OTHER | Age: 89
End: 2024-04-15
Payer: MEDICARE

## 2024-04-15 ENCOUNTER — LAB VISIT (OUTPATIENT)
Dept: LAB | Facility: HOSPITAL | Age: 89
End: 2024-04-15
Attending: INTERNAL MEDICINE
Payer: MEDICARE

## 2024-04-15 ENCOUNTER — TELEPHONE (OUTPATIENT)
Dept: INTERNAL MEDICINE | Facility: CLINIC | Age: 89
End: 2024-04-15
Payer: MEDICARE

## 2024-04-15 DIAGNOSIS — N17.9 AKI (ACUTE KIDNEY INJURY): ICD-10-CM

## 2024-04-15 LAB
ALBUMIN SERPL BCP-MCNC: 4.3 G/DL (ref 3.5–5.2)
ALP SERPL-CCNC: 119 U/L (ref 55–135)
ALT SERPL W/O P-5'-P-CCNC: 16 U/L (ref 10–44)
ANION GAP SERPL CALC-SCNC: 13 MMOL/L (ref 8–16)
AST SERPL-CCNC: 21 U/L (ref 10–40)
BILIRUB SERPL-MCNC: 0.8 MG/DL (ref 0.1–1)
BUN SERPL-MCNC: 43 MG/DL (ref 10–30)
CALCIUM SERPL-MCNC: 9.9 MG/DL (ref 8.7–10.5)
CHLORIDE SERPL-SCNC: 97 MMOL/L (ref 95–110)
CO2 SERPL-SCNC: 19 MMOL/L (ref 23–29)
CREAT SERPL-MCNC: 1.3 MG/DL (ref 0.5–1.4)
EST. GFR  (NO RACE VARIABLE): 38.6 ML/MIN/1.73 M^2
GLUCOSE SERPL-MCNC: 97 MG/DL (ref 70–110)
POTASSIUM SERPL-SCNC: 5.8 MMOL/L (ref 3.5–5.1)
PROT SERPL-MCNC: 7.4 G/DL (ref 6–8.4)
SODIUM SERPL-SCNC: 129 MMOL/L (ref 136–145)

## 2024-04-15 PROCEDURE — 36415 COLL VENOUS BLD VENIPUNCTURE: CPT | Mod: HCNC,PO | Performed by: INTERNAL MEDICINE

## 2024-04-15 PROCEDURE — 80053 COMPREHEN METABOLIC PANEL: CPT | Mod: HCNC | Performed by: INTERNAL MEDICINE

## 2024-04-15 NOTE — TELEPHONE ENCOUNTER
----- Message from Tavia Hightower sent at 4/15/2024  7:09 AM CDT -----  Contact: Pt [phone]  Patient is requesting a call back to discuss the up coming labs scheduled for tomorrow.    Please call and advise.    Thank You

## 2024-04-15 NOTE — TELEPHONE ENCOUNTER
Please have patient resume her usual fluid intake restrictions.  Have patient double dose of Lasix for the next 3 days.  If patient has not had a bowel movement since her last visit, she needs to go to the emergency room.

## 2024-04-15 NOTE — PROGRESS NOTES
This Community Health Worker (CHW) completed Social Determinant of Health (SDOH)  Questionnaire with patient/caregiver via telephone today.  Notified OPCM CM lOive Holder RN  of completion.      Patient denied any SDOH needs at this time, however CHW will follow up for outcome of call with utility company.

## 2024-04-15 NOTE — TELEPHONE ENCOUNTER
Called Pt, [phone]  Regarding fluid intake, weight fluctuation, and light-headedness.    Pt stated she has gained 4 lbs overnight, and been feeling light-headed.    Pt stated Currently she getting different/contradicting instructions from PCP and Cardiologist:  -Currently instructed to drink lots of fluids while taking Miralax for Constipation,   -And instructed to limit fluid intake and taking  Furosemide.    She would like to get clear advice from PCP or Cardiologist regarding these directions.

## 2024-04-16 ENCOUNTER — OUTPATIENT CASE MANAGEMENT (OUTPATIENT)
Dept: ADMINISTRATIVE | Facility: OTHER | Age: 89
End: 2024-04-16
Payer: MEDICARE

## 2024-04-16 ENCOUNTER — PATIENT MESSAGE (OUTPATIENT)
Dept: ADMINISTRATIVE | Facility: HOSPITAL | Age: 89
End: 2024-04-16
Payer: MEDICARE

## 2024-04-16 ENCOUNTER — TELEPHONE (OUTPATIENT)
Dept: INTERNAL MEDICINE | Facility: CLINIC | Age: 89
End: 2024-04-16
Payer: MEDICARE

## 2024-04-16 NOTE — PROGRESS NOTES
Outpatient Care Management  Plan of Care Follow Up Visit    Patient: Jenniffer Jimenez  MRN: [id number]  Date of Service: 04/15/2024  Completed by: Olive Holder RN  Referral Date: 04/09/2024    Reason for Visit   Patient presents with    OPCM RN Follow Up Call    OPCM Chart Review       Brief Summary:   OPCM RN follow-up call completed.   Continue education on Constipation, UTI, CHF.  Next Steps: Patient is in agreement to follow-up call on or around 4/16/2024 due to weight gain.

## 2024-04-18 ENCOUNTER — HOSPITAL ENCOUNTER (OUTPATIENT)
Dept: RADIOLOGY | Facility: HOSPITAL | Age: 89
Discharge: HOME | End: 2024-04-18
Attending: ORTHOPAEDIC SURGERY
Payer: MEDICARE

## 2024-04-18 ENCOUNTER — OFFICE VISIT (OUTPATIENT)
Dept: ORTHOPEDICS | Facility: CLINIC | Age: 89
End: 2024-04-18
Payer: MEDICARE

## 2024-04-18 VITALS — HEIGHT: 65 IN | BODY MASS INDEX: 26.81 KG/M2 | WEIGHT: 160.94 LBS

## 2024-04-18 DIAGNOSIS — M25.512 LEFT SHOULDER PAIN, UNSPECIFIED CHRONICITY: ICD-10-CM

## 2024-04-18 DIAGNOSIS — R29.898 WEAKNESS OF SHOULDER: ICD-10-CM

## 2024-04-18 DIAGNOSIS — M75.102 TEAR OF LEFT SUPRASPINATUS TENDON: ICD-10-CM

## 2024-04-18 DIAGNOSIS — M25.512 LEFT SHOULDER PAIN, UNSPECIFIED CHRONICITY: Primary | ICD-10-CM

## 2024-04-18 PROCEDURE — 99024 POSTOP FOLLOW-UP VISIT: CPT | Mod: HCNC,S$GLB,, | Performed by: ORTHOPAEDIC SURGERY

## 2024-04-18 PROCEDURE — 99999 PR PBB SHADOW E&M-EST. PATIENT-LVL II: CPT | Mod: PBBFAC,HCNC,, | Performed by: ORTHOPAEDIC SURGERY

## 2024-04-18 PROCEDURE — 1101F PT FALLS ASSESS-DOCD LE1/YR: CPT | Mod: HCNC,CPTII,S$GLB, | Performed by: ORTHOPAEDIC SURGERY

## 2024-04-18 PROCEDURE — 73030 X-RAY EXAM OF SHOULDER: CPT | Mod: TC,HCNC,PN,LT

## 2024-04-18 PROCEDURE — 1125F AMNT PAIN NOTED PAIN PRSNT: CPT | Mod: HCNC,CPTII,S$GLB, | Performed by: ORTHOPAEDIC SURGERY

## 2024-04-18 PROCEDURE — 73030 X-RAY EXAM OF SHOULDER: CPT | Mod: 26,HCNC,LT, | Performed by: INTERNAL MEDICINE

## 2024-04-18 PROCEDURE — 3288F FALL RISK ASSESSMENT DOCD: CPT | Mod: HCNC,CPTII,S$GLB, | Performed by: ORTHOPAEDIC SURGERY

## 2024-04-18 NOTE — PROGRESS NOTES
"Subjective:      Patient ID: Jenniffer Jimenez is a 89 y.o. female.  Chief Complaint: Pain of the Right Hand      HPI  Jenniffer Jimenez is a  89 y.o. female presenting today for follow up of I and D of infected hand right hand due to cat bite.  She reports that she is improving with the right hand but now having pain in the left shoulder related to an old injury.    Review of patient's allergies indicates:   Allergen Reactions    Opioids - morphine analogues Other (See Comments)     Bowel issues; bowel obstruction    Tizanidine Other (See Comments)     "Lips were numb,  Almost passed out."    Tramadol Hallucinations    Beta-blockers (beta-adrenergic blocking agts) Other (See Comments)     Can not go on beta blockers for long period of time - due to taking allergy injections    Morphine     Opioids-meperidine and related     Ciprofloxacin Rash         Current Outpatient Medications   Medication Sig Dispense Refill    albuterol-ipratropium (DUO-NEB) 2.5 mg-0.5 mg/3 mL nebulizer solution Take 3 mLs by nebulization every 6 (six) hours as needed for Wheezing or Shortness of Breath. Rescue 75 mL 0    aspirin (ECOTRIN) 81 MG EC tablet Take 81 mg by mouth once daily.      COMIRNATY 2023-24, 12Y UP,,PF, 30 mcg/0.3 mL inection       ferrous sulfate 325 (65 FE) MG EC tablet Take 1 tablet (325 mg total) by mouth once daily.      FLUAD QUAD 2023-24,65Y UP,,PF, 60 mcg (15 mcg x 4)/0.5 mL Syrg       furosemide (LASIX) 20 MG tablet Take 2 tablets in the a.m. for weights 155-159, take 2 tablets twice a day for weights 160 over, none for weights less than 155 120 tablet 11    polyethylene glycol (GLYCOLAX) 17 gram/dose powder Take 17 g by mouth once daily. 510 g 0    pravastatin (PRAVACHOL) 40 MG tablet Take 1 tablet (40 mg total) by mouth once daily. 90 tablet 3    propylene glycol (SYSTANE COMPLETE OPHT) Apply to eye.      psyllium 0.52 gram capsule Take 3 capsules by mouth 3 (three) times daily as needed (constipation).   "    spironolactone (ALDACTONE) 25 MG tablet Take 1 tablet (25 mg total) by mouth 2 (two) times daily. 180 tablet 3    vitamin E 400 UNIT capsule Take 400 Units by mouth once daily.      clotrimazole-betamethasone 1-0.05% (LOTRISONE) cream Apply topically 2 (two) times daily. (Patient not taking: Reported on 4/15/2024) 45 g 1    diclofenac sodium (VOLTAREN) 1 % Gel Apply 2 g topically 4 (four) times daily. Apply to right hip (Patient not taking: Reported on 4/15/2024)      gabapentin (NEURONTIN) 300 MG capsule Take 1 capsule (300 mg total) by mouth every evening. (Patient not taking: Reported on 4/15/2024) 30 capsule 0    miconazole (MICOTIN) 2 % cream Per instructions 2 TIMES DAILY (route: topical) (Patient not taking: Reported on 4/15/2024)      miconazole nitrate 2% (MICOTIN) 2 % Oint Apply topically 2 (two) times daily. Coloplast Clear Antifungal ointment- (green top)- nursing to apply to perineum, inner thighs, pannus, and buttocks BID (Patient not taking: Reported on 4/15/2024) 141 g 0    ondansetron (ZOFRAN-ODT) 8 MG TbDL Take 1 tablet (8 mg total) by mouth 3 (three) times daily as needed (nausea or vomiting). (Patient not taking: Reported on 4/15/2024) 20 tablet 0    silver sulfADIAZINE 1% (SILVADENE) 1 % cream Apply to RLE skin breakdown twice daily (Patient not taking: Reported on 4/15/2024)      triamcinolone acetonide 0.1% (KENALOG) 0.1 % ointment Apply topically 2 (two) times daily. Use to affected areas for up to 2 weeks then take a 1 week break or decrease to 3 times weekly. Do not apply to groin or face. Apply to red scaly areas on the legs and arms (Patient not taking: Reported on 4/15/2024) 454 g 1     No current facility-administered medications for this visit.       Past Medical History:   Diagnosis Date    Amblyopia of left eye 04/10/2013    Arthritis     Facet arthropathy, Lumbosacral    Atherosclerosis of aorta     Cataract     Central retinal vein occlusion of left eye     CKD (chronic kidney  "disease) stage 3, GFR 30-59 ml/min     Diabetes mellitus, type 2     Diabetic polyneuropathy 2022    Exotropia of both eyes 2013    recession RSR 5.0mm w/ adj; recession LR os 5.0 w/ adj; resect MR os  4.0mm    Hearing loss     History of resection of small bowel     Hypertensive retinopathy of both eyes     Hypoglycemia     Macular degeneration     OA (osteoarthritis) of shoulder     Right    Osteoporosis     Posterior vitreous detachment of both eyes     Psychiatric problem     Rhinitis     TIA (transient ischemic attack)        Past Surgical History:   Procedure Laterality Date    APPENDECTOMY      CARDIAC CATHETERIZATION      CATARACT EXTRACTION W/  INTRAOCULAR LENS IMPLANT Bilateral      SECTION, CLASSIC      CLOSURE OF LEFT ATRIAL APPENDAGE USING DEVICE N/A 2020    Procedure: Left atrial appendage closure device;  Surgeon: Abundio Curtis MD;  Location: [facility] CATH LAB;  Service: Cardiology;  Laterality: N/A;    HYSTERECTOMY      INNER EAR SURGERY      IRRIGATION AND DEBRIDEMENT OF UPPER EXTREMITY Right 2024    Procedure: IRRIGATION AND DEBRIDEMENT, UPPER EXTREMITY;  Surgeon: Con Moran Jr., MD;  Location: [facility];  Service: Orthopedics;  Laterality: Right;    JOINT REPLACEMENT      LEFT KNEE REPLACEMENT IN 2013 -    OOPHORECTOMY      SINUS SURGERY      STRABISMUS SURGERY  13    RSR recession 5 mm, LLR recession 5 mm and LMR resection 4mm    STRABISMUS SURGERY  2014    recess LR OD 6mm    TONSILLECTOMY      watchman surgery N/A 2020       OBJECTIVE:   PHYSICAL EXAM:  Height: 5' 5" (165.1 cm) Weight: 73 kg (160 lb 15 oz)  Vitals:    24 0836   Weight: 73 kg (160 lb 15 oz)   Height: 5' 5" (1.651 m)   PainSc:   2     Ortho/SPM Exam  Examination right hand the previous incision well healed no swelling no redness range of motion fingers good  strength decreased   Examination left shoulder shows previous well healed surgical incision range of motion limited " especially elevation with weakness of the rotator    RADIOGRAPHS:  AP lateral x-ray left shoulder show previous krysten placement of the humeral shaft with some elevation of the humeral head  Comments: I have personally reviewed the imaging and I agree with the above radiologist's report.    ASSESSMENT/PLAN:     IMPRESSION:  1. Status post I&D cat bite infection right hand doing well.      2. Chronic left shoulder weakness probably related to rotator cuff tear    PLAN:  For the right hand I recommended a squeeze ball advance activities as tolerated   For the left shoulder I recommended a round of physical therapy for range of motion and strengthening  Although I did explain that there is no guarantee that she will have improvement fortunately it has not too painful at the present time although we could try an injection pain worsens    FOLLOW UP:  6-8 weeks    Disclaimer: This note has been generated using voice-recognition software. There may be typographical errors that have been missed during proof-reading.

## 2024-04-19 ENCOUNTER — PATIENT OUTREACH (OUTPATIENT)
Dept: ADMINISTRATIVE | Facility: OTHER | Age: 89
End: 2024-04-19
Payer: MEDICARE

## 2024-04-19 NOTE — PROGRESS NOTES
CHW - Outreach Attempt    Community Health Worker left a voicemail message for 1st attempt to contact patient regarding: [employer]  Community Health Worker to attempt to contact patient on: telephone

## 2024-04-22 ENCOUNTER — OUTPATIENT CASE MANAGEMENT (OUTPATIENT)
Dept: ADMINISTRATIVE | Facility: OTHER | Age: 89
End: 2024-04-22
Payer: MEDICARE

## 2024-04-22 NOTE — PROGRESS NOTES
Outpatient Care Management  Plan of Care Follow Up Visit    Patient: Jenniffer Jimenez  MRN: [id number]  Date of Service: 04/22/2024  Completed by: Olive Holder RN  Referral Date: 04/09/2024    Reason for Visit   Patient presents with    OPCM RN Follow Up Call    OPCM Chart Review       Brief Summary:   OPCM RN follow-up call completed.   Continue education on Constipation, CHF, UTI.   Next Steps: Patient is in agreement to follow-up call on or around 5/13/2024.

## 2024-04-25 ENCOUNTER — OFFICE VISIT (OUTPATIENT)
Dept: PODIATRY | Facility: CLINIC | Age: 89
End: 2024-04-25
Payer: MEDICARE

## 2024-04-25 VITALS
RESPIRATION RATE: 20 BRPM | DIASTOLIC BLOOD PRESSURE: 58 MMHG | WEIGHT: 160.94 LBS | BODY MASS INDEX: 26.81 KG/M2 | HEIGHT: 65 IN | SYSTOLIC BLOOD PRESSURE: 132 MMHG | HEART RATE: 63 BPM

## 2024-04-25 DIAGNOSIS — L60.9 NAIL ABNORMALITIES: ICD-10-CM

## 2024-04-25 DIAGNOSIS — G60.9 IDIOPATHIC PERIPHERAL NEUROPATHY: Primary | ICD-10-CM

## 2024-04-25 DIAGNOSIS — B35.1 ONYCHOMYCOSIS DUE TO DERMATOPHYTE: ICD-10-CM

## 2024-04-25 PROCEDURE — 99999 PR PBB SHADOW E&M-EST. PATIENT-LVL IV: CPT | Mod: PBBFAC,HCNC,, | Performed by: PODIATRIST

## 2024-04-25 PROCEDURE — 11721 DEBRIDE NAIL 6 OR MORE: CPT | Mod: Q9,HCNC,S$GLB, | Performed by: PODIATRIST

## 2024-04-25 PROCEDURE — 99499 UNLISTED E&M SERVICE: CPT | Mod: HCNC,S$GLB,, | Performed by: PODIATRIST

## 2024-04-25 NOTE — PROGRESS NOTES
Subjective:     Patient ID: Jenniffer Jimenez is a 89 y.o. female.    Chief Complaint: Peripheral Neuropathy and Nail Care    Jenniffer is a 89 y.o. female who presents to the clinic for evaluation and treatment of high risk feet. Jenniffer has a past medical history of Amblyopia of left eye (04/10/2013), Arthritis, Atherosclerosis of aorta, Cataract, Central retinal vein occlusion of left eye, CKD (chronic kidney disease) stage 3, GFR 30-59 ml/min, Diabetes mellitus, type 2, Diabetic polyneuropathy (01/06/2022), Exotropia of both eyes (02/06/2013), Hearing loss, History of resection of small bowel, Hypertensive retinopathy of both eyes, Hypoglycemia, Macular degeneration, OA (osteoarthritis) of shoulder, Osteoporosis, Posterior vitreous detachment of both eyes, Psychiatric problem, Rhinitis, and TIA (transient ischemic attack). The patient's chief complaint is long, thick toenails. This patient has documented high risk feet requiring routine maintenance secondary to diabetes mellitis and those secondary complications of diabetes, as mentioned..    PCP: Reed Ruiz MD    Date Last Seen by PCP:   Chief Complaint   Patient presents with    Peripheral Neuropathy    Nail Care     Hemoglobin A1C   Date Value Ref Range Status   04/10/2024 5.9 (H) 4.0 - 5.6 % Final     Comment:     ADA Screening Guidelines:  5.7-6.4%  Consistent with prediabetes  >or=6.5%  Consistent with diabetes    High levels of fetal hemoglobin interfere with the HbA1C  assay. Heterozygous hemoglobin variants (HbS, HgC, etc)do  not significantly interfere with this assay.   However, presence of multiple variants may affect accuracy.     04/28/2023 5.9 (H) 4.0 - 5.6 % Final     Comment:     ADA Screening Guidelines:  5.7-6.4%  Consistent with prediabetes  >or=6.5%  Consistent with diabetes    High levels of fetal hemoglobin interfere with the HbA1C  assay. Heterozygous hemoglobin variants (HbS, HgC, etc)do  not significantly interfere with this assay.  "  However, presence of multiple variants may affect accuracy.     01/26/2023 6.0 (H) 4.0 - 5.6 % Final     Comment:     ADA Screening Guidelines:  5.7-6.4%  Consistent with prediabetes  >or=6.5%  Consistent with diabetes    High levels of fetal hemoglobin interfere with the HbA1C  assay. Heterozygous hemoglobin variants (HbS, HgC, etc)do  not significantly interfere with this assay.   However, presence of multiple variants may affect accuracy.         Review of Systems   Constitutional: Negative for chills, decreased appetite and fever.   Cardiovascular:  Negative for leg swelling.   Skin:  Positive for dry skin and nail changes.   Musculoskeletal:  Positive for arthritis. Negative for joint pain, joint swelling and myalgias.   Gastrointestinal:  Negative for nausea and vomiting.   Neurological:  Negative for loss of balance, numbness and paresthesias.        Objective:     Vitals:    04/25/24 1104   BP: (!) 132/58   Pulse: 63   Resp: 20   Weight: 73 kg (160 lb 15 oz)   Height: 5' 5" (1.651 m)   PainSc: 0-No pain        Physical Exam  Vitals reviewed.   Constitutional:       Appearance: She is well-developed.   Cardiovascular:      Pulses:           Dorsalis pedis pulses are 1+ on the right side and 1+ on the left side.        Posterior tibial pulses are 1+ on the right side and 1+ on the left side.      Comments: Toes are cool to touch. Feet are warm proximally.There is decreased digital hair. Skin is atrophic, slightly hyperpigmented, and mildly edematous      Musculoskeletal:         General: No tenderness. Normal range of motion.      Comments: Adequate joint range of motion without pain, limitation, nor crepitation Bilateral feet and ankle joints. Muscle strength is 5/5 in all groups bilaterally.         Skin:     General: Skin is warm and dry.      Coloration: Skin is not ashen or jaundiced.      Findings: No bruising, ecchymosis, erythema, lesion or rash.      Comments: Nails x10 are elongated by  2-5mm's, " thickened by 2-5 mm's, dystrophic, and are darkened in  coloration . Xerosis Bilaterally. No open lesions noted.       Neurological:      Mental Status: She is alert and oriented to person, place, and time.      Comments: Bogart-Anne 5.07 monofilamant testing is diminished Senthil feet. Sharp/dull sensation diminished Bilaterally. Light touch absent Bilaterally.       Psychiatric:         Behavior: Behavior normal.       Assessment:      Encounter Diagnoses   Name Primary?    Idiopathic peripheral neuropathy Yes    Nail abnormalities     Onychomycosis due to dermatophyte      Plan:     Jenniffer was seen today for peripheral neuropathy and nail care.    Diagnoses and all orders for this visit:    Idiopathic peripheral neuropathy    Nail abnormalities    Onychomycosis due to dermatophyte      I counseled the patient on her conditions, their implications and medical management.        - Shoe inspection. Diabetic Foot Education. Patient reminded of the importance of good nutrition and blood sugar control to help prevent podiatric complications of diabetes. Patient instructed on proper foot hygeine. We discussed wearing proper shoe gear, daily foot inspections, never walking without protective shoe gear, never putting sharp instruments to feet, routine podiatric nail visits every 2-3 months.      - With patient's permission, nails were aggressively reduced and debrided x 10 to their soft tissue attachment mechanically  removing all offending nail and debris. Patient relates relief following the procedure. She will continue to monitor the areas daily, inspect her feet, wear protective shoe gear when ambulatory, moisturizer to maintain skin integrity and follow in this office in approximately 2-3 months, sooner p.r.n.

## 2024-04-29 ENCOUNTER — CLINICAL SUPPORT (OUTPATIENT)
Dept: AUDIOLOGY | Facility: CLINIC | Age: 89
End: 2024-04-29
Payer: MEDICARE

## 2024-04-29 ENCOUNTER — PATIENT OUTREACH (OUTPATIENT)
Dept: ADMINISTRATIVE | Facility: OTHER | Age: 89
End: 2024-04-29
Payer: MEDICARE

## 2024-04-29 DIAGNOSIS — H90.3 ASYMMETRICAL SENSORINEURAL HEARING LOSS: Primary | ICD-10-CM

## 2024-04-29 PROCEDURE — 99499 UNLISTED E&M SERVICE: CPT | Mod: HCNC,S$GLB,, | Performed by: AUDIOLOGIST

## 2024-04-29 NOTE — PROGRESS NOTES
CHW - Follow Up    This Community Health Worker completed a follow up visit with patient via telephone today.  Pt/Caregiver reported:  Ms. Jimenez stated she has been experiencing constant diarrhea for about a week now. She has been prescribe stool softeners and also takes Miralax. She believes this has caused her diarrhea. She has stopped taking the miralax and is thinking about going to only a liquid diet in hopes it will slow her diarrhea.   Community Health Worker provided: I have reached out to CM Rn Olive Holder to informed of the call with Ms. Jimenez and her concerns. Olive has agreed to reach out to ms Jimenez and I will follow for outcome of call. No additional needs at this time.   Follow up required: yes

## 2024-04-29 NOTE — PROGRESS NOTES
[facility] Follow-up:    Ms. Abad was seen today for a hearing aid follow-up appointment to be fit with her re-made left c shell. Ms. Abad reported improved physical fit, but requested the cshell be made with a deeper canal portion. Ms. Abad's hearing aids were cleaned and checked. Otoscopy revealed clear canals bilaterally. Ms. Abad reported comfortable sound quality. She will return in 3 weeks when her remade c  shell arrives to clinic.             BUNDLED PACKAGE  LEFT:     [employer] [id number]   Purchase Date: 3/14/2023  Khadijah Gutierrez   SN: [id number]   Speaker: #1 UP   Service Warranty Exp: 06/11/26   One time courtesy replacement     cShell 4.0 Acrylic   SN: [id number] ACC: [id number]   Service Warranty Exp: 05/07/2024      RIGHT:     RT SN: [id number]   Warranty Exp:  06/11/2026   Small power dome

## 2024-04-30 ENCOUNTER — OUTPATIENT CASE MANAGEMENT (OUTPATIENT)
Dept: ADMINISTRATIVE | Facility: OTHER | Age: 89
End: 2024-04-30
Payer: MEDICARE

## 2024-05-01 ENCOUNTER — OUTPATIENT CASE MANAGEMENT (OUTPATIENT)
Dept: ADMINISTRATIVE | Facility: OTHER | Age: 89
End: 2024-05-01
Payer: MEDICARE

## 2024-05-01 NOTE — PROGRESS NOTES
Outpatient Care Management  Plan of Care Follow Up Visit    Patient: Jenniffer Jimenez  MRN: [id number]  Date of Service: 05/01/2024  Completed by: Olive Holder RN  Referral Date: 04/09/2024    Reason for Visit   Patient presents with    OPCM RN Follow Up Call    OPCM Chart Review       Brief Summary:   OPCM RN follow-up call completed.   Continue education on Constipation, HF, UTI.   Next Steps: Patient is in agreement to follow-up call on or around 5/13/2024.

## 2024-05-03 ENCOUNTER — PATIENT OUTREACH (OUTPATIENT)
Dept: ADMINISTRATIVE | Facility: OTHER | Age: 89
End: 2024-05-03
Payer: MEDICARE

## 2024-05-03 NOTE — PROGRESS NOTES
CHW - Case Closure    This Community Health Worker spoke to patient via telephone today.   Pt/Caregiver reported: Ms. Jimenez stated that she isn't having any diarrhea at the moment and when she does have to have a bowel movement its small enedina.    Pt denied any additional needs at this time and agrees with episode closure at this time.  Provided patient with Community Health Worker's contact information and encouraged him/her to contact this Community Health Worker if additional needs arise.

## 2024-05-09 ENCOUNTER — CLINICAL SUPPORT (OUTPATIENT)
Dept: REHABILITATION | Facility: HOSPITAL | Age: 89
End: 2024-05-09
Attending: ORTHOPAEDIC SURGERY
Payer: MEDICARE

## 2024-05-09 DIAGNOSIS — M75.102 TEAR OF LEFT SUPRASPINATUS TENDON: ICD-10-CM

## 2024-05-09 DIAGNOSIS — M62.81 DECREASED MUSCLE STRENGTH: ICD-10-CM

## 2024-05-09 DIAGNOSIS — M25.512 LEFT SHOULDER PAIN, UNSPECIFIED CHRONICITY: ICD-10-CM

## 2024-05-09 DIAGNOSIS — M25.60 DECREASED RANGE OF MOTION: Primary | ICD-10-CM

## 2024-05-09 PROCEDURE — 97110 THERAPEUTIC EXERCISES: CPT

## 2024-05-09 PROCEDURE — 97165 OT EVAL LOW COMPLEX 30 MIN: CPT

## 2024-05-09 NOTE — PATIENT INSTRUCTIONS
Access Code: [email or web address]  URL: [email or web address]  Date: 05/09/2024  Prepared by: Nereida Barksdale    Exercises  - Seated Shoulder Flexion Towel Slide at Table Top  - 1 x daily - 7 x weekly - 3 sets - 10 reps  - Supine Shoulder Flexion AAROM with Hands Clasped  - 1 x daily - 7 x weekly - 3 sets - 10 reps  - Seated Shoulder Flexion AAROM with Pulley Behind  - 1 x daily - 7 x weekly - 3 sets - 10 reps  - Seated Shoulder Scaption Slide at Table Top with Forearm in Neutral  - 1 x daily - 7 x weekly - 3 sets - 10 reps

## 2024-05-13 ENCOUNTER — OUTPATIENT CASE MANAGEMENT (OUTPATIENT)
Dept: ADMINISTRATIVE | Facility: OTHER | Age: 89
End: 2024-05-13
Payer: MEDICARE

## 2024-05-20 ENCOUNTER — CLINICAL SUPPORT (OUTPATIENT)
Dept: REHABILITATION | Facility: HOSPITAL | Age: 89
End: 2024-05-20
Payer: MEDICARE

## 2024-05-20 DIAGNOSIS — M25.60 DECREASED RANGE OF MOTION: Primary | ICD-10-CM

## 2024-05-20 DIAGNOSIS — M62.81 DECREASED MUSCLE STRENGTH: ICD-10-CM

## 2024-05-20 PROCEDURE — 97530 THERAPEUTIC ACTIVITIES: CPT

## 2024-05-20 NOTE — PROGRESS NOTES
"[facility]  Occupational Therapy Treatment Note     Date: 5/20/2024  Name: Jenniffer Jimenez  Clinic Number: [id number]    Therapy Diagnosis:   Encounter Diagnoses   Name Primary?    Decreased range of motion Yes    Decreased muscle strength      Physician: Con Moran Jr., *      Physician Orders:   Medical Diagnosis:   Diagnosis   M25.512 (ICD-10-CM) - Left shoulder pain, unspecified chronicity   M75.102 (ICD-10-CM) - Tear of left supraspinatus tendon      Date of Injury/Onset:  > 2 years   Evaluation Date: 5/9/2024  Insurance Authorization Period Expiration: 12/31/2024  Plan of Care Expiration: 10 weeks; 7/19/2024  Date of Return to MD: TBD  Visit # / Visits authorized: 1 / 1  FOTO: (date and score)        FOTO Lobby Code:      Precautions:  Standard and Fall           Time In: 11:00 AM   Time Out: 12:00 PM  Total Appointment Time (timed & untimed codes): 60 minutes        Subjective     Patient reports: "I lost my sheet of paper. But I was doing the table slides  She was compliant with home exercise program given last session.   Response to previous treatment: first tx   Functional change: none noted to this date       Pain: 0/10  Location: left shoulder      Objective     Objective Measures updated at progress report unless specified.    Shoulder ROM. Measured in degrees    5/9/2024 5/9/2024     Left Right   Shoulder Flexion 30 A  105 P  135   Shoulder Abduction 40 WNL    Shoulder Extension        Shoulder IR Belt  Belt    Shoulder ER             Elbow and Wrist ROM. Measured in degrees.    5/20/2024 5/20/2024     Left Right   Elbow Ext/Flex       Supination/Pronation       Wrist Ext/Flex       Wrist RD/UD          Hand ROM. Measured in degrees.    5/20/2024 5/20/2024     Left Right           Index: MP                   PIP                      DIP                  SOOD               Long:  MP                  PIP                  DIP                  SOOD               Ring:   " MP                  PIP                  DIP                  SOOD               Small:  MP                   PIP                   DIP                  SOOD               Thumb: MP                    IP           Rad ADD/ABD           Pal ADD/ABD              Opposition              Strength (Dynamometer) and Pinch Strength (Pinch Gauge)  Measured in pounds.    5/20/2024 5/20/2024     Left Right   Rung II 29/34 25/21   Key Pinch       3pt Pinch       2pt Pinch          Sensation    5/9/2024 5/9/2024     Left Right   [place]       Normal 1.65-2.83       Diminished Light Touch 3.22-3.61       Diminished Protective 3.84-4.31       Loss of Protective 4.56-6.65       Untestable >6.65       2 Point Discrimination       Static       Dynamic          Manual Muscle Test    5/20/2024 5/20/2024     Left Right   Wrist Extension        Wrist Flexion       Radial Deviation       Ulnar Deviation       Supination       Pronation       Elbow Extension       Elbow Flexion            Limitation/Restriction for FOTO initial eval; shoulder  Survey     Therapist reviewed FOTO scores for Norwalkshade Jimenez on 5/9/2024.   FOTO documents entered into [employer] - see Media section.     Limitation Score: %          Treatment     [place] received the treatments listed below:          therapeutic exercises to develop endurance for 25 minutes including:  Forward/scaption table slide x 10 reps each   AAROM shoulder flexion OH raises x 10 reps   Shoulder shrugs       therapeutic activities to improve functional performance for 25  minutes, including:  Stacking cones (functional ER + flexion)  Ambulation for safety, contact guard ~       hot pack for 10 minutes to L shoulder.        Patient Education and Home Exercises     Education provided:   - HEP print out  - Progress towards goals     Written Home Exercises Provided: yes.  Exercises were reviewed and [place] was able to demonstrate them prior to the end of the session.  [place] demonstrated  good  understanding of the home exercise program provided. See electronic medical record under Patient Instructions for exercises provided during therapy sessions.       Assessment     Demonstrated good endurance throughout session. However patient grunting during exercises yet verbalizes she is not in pain.      Jenniffer is progressing well towards her goals and there are no updates to goals at this time. Pt prognosis is Good.     Patient will continue to benefit from skilled outpatient occupational therapy to address the deficits listed in the problem list on initial evaluation provide patient/family education and to maximize patient's level of independence in the home and community environment.     Patient's spiritual, cultural and educational needs considered and patient agreeable to plan of care and goals.    Anticipated barriers to occupational therapy:     Goals:  Long Term Goals (LTGs); to be met by discharge.  Long Term Goals (LTGs); to be met by discharge.  Pt will demo improved  strength by 10 pounds   Pt will improve shoulder ROM by 20 degrees   Pt will report pain level no greater than 1-3/10 during functional daily work and community activities      Short Term Goals (STGs); to be met within 4 weeks ().  Pt will demo improved  strength by 5 pounds   Pt will improve shoulder ROM by 10 degrees   Pt will report pain level no greater than 3-4/10 during light activity     Plan     Updates/Grading for next session:     Nereida Barksdale OT   5/20/2024

## 2024-05-20 NOTE — PLAN OF CARE
[facility]  Occupational Therapy Initial Evaluation    Date: 5/9/2024  Name: Jenniffer Jimenez  Clinic Number: [id number]    Therapy Diagnosis:   Encounter Diagnoses   Name Primary?    Left shoulder pain, unspecified chronicity     Tear of left supraspinatus tendon     Decreased range of motion Yes    Decreased muscle strength      Physician: Con Moran Jr., *      Physician Orders:   Medical Diagnosis:   Diagnosis   M25.512 (ICD-10-CM) - Left shoulder pain, unspecified chronicity   M75.102 (ICD-10-CM) - Tear of left supraspinatus tendon     Date of Injury/Onset:  > 2 years   Evaluation Date: 5/9/2024  Insurance Authorization Period Expiration: 12/31/2024  Plan of Care Expiration: 10 weeks; 7/19/2024  Date of Return to MD: TBD  Visit # / Visits authorized: 1 / 1  FOTO: (date and score)      FOTO Lobby Code:     Precautions:  Standard and Fall        Time In: 11:00 AM   Time Out: 12:00 PM  Total Appointment Time (timed & untimed codes): 60 minutes      SUBJECTIVE     Date of Onset: 2 years     History of Current Condition/Mechanism of Injury: [place] reports:  60 min of bike everyday, cellulitis 1 year, 5 weeks in hospital. 2 years ago carrying case of water. Currently no pain. Limited ROM wants to be able to raise arm to shoulder height. She was working out in the gym at [employer] [employer]. Pulley, table slides,        Falls: 3        Involved Side: Left   Dominant Side: Right  Imaging:    Prior Therapy: at living facility   Occupation:    Working presently:   Duties:     Functional Limitations/Social History:    Previous functional status includes: Independent with all ADLs.     Current Functional Status   Home/Living environment: lives alone      Limitation of Functional Status as follows:   ADLs/IADLs:     - Feeding:     - Bathing: shower chair,      - Dressing/Grooming:     - Driving:        Leisure:     Pain:  Functional Pain Scale Rating 0-10: Current 0/10, worst /10, best /10    Location:   Description: Aching  Aggravating Factors: Morning  Easing Factors: rest     Patient's Goals for Therapy:     Medical History:   Past Medical History:   Diagnosis Date    Amblyopia of left eye 04/10/2013    Arthritis     Facet arthropathy, Lumbosacral    Atherosclerosis of aorta     Cataract     Central retinal vein occlusion of left eye     CKD (chronic kidney disease) stage 3, GFR 30-59 ml/min     Diabetes mellitus, type 2     Diabetic polyneuropathy 2022    Exotropia of both eyes 2013    recession RSR 5.0mm w/ adj; recession LR os 5.0 w/ adj; resect MR os  4.0mm    Hearing loss     History of resection of small bowel     Hypertensive retinopathy of both eyes     Hypoglycemia     Macular degeneration     OA (osteoarthritis) of shoulder     Right    Osteoporosis     Posterior vitreous detachment of both eyes     Psychiatric problem     Rhinitis     TIA (transient ischemic attack)        Surgical History:    has a past surgical history that includes Cardiac catheterization; Inner ear surgery; Sinus surgery; Tonsillectomy;  section, classic; Appendectomy; Strabismus surgery (13); Strabismus surgery (2014); Cataract extraction w/  intraocular lens implant (Bilateral); Hysterectomy; Oophorectomy; Joint replacement; Closure of left atrial appendage using device (N/A, 2020); watchman surgery (N/A, 2020); and Irrigation and debridement of upper extremity (Right, 2024).    Medications:   has a current medication list which includes the following prescription(s): albuterol-ipratropium, aspirin, clotrimazole-betamethasone 1-0.05%, comirnaty  (12y up)(pf), diclofenac sodium, ferrous sulfate, fluad quad (65y up)(pf), furosemide, gabapentin, miconazole, miconazole nitrate 2%, ondansetron, pravastatin, propylene glycol, psyllium, silver sulfadiazine 1%, spironolactone, triamcinolone acetonide 0.1%, and vitamin e.    Allergies:   Review of patient's allergies  "indicates:   Allergen Reactions    Opioids - morphine analogues Other (See Comments)     Bowel issues; bowel obstruction    Tizanidine Other (See Comments)     "Lips were numb,  Almost passed out."    Tramadol Hallucinations    Beta-blockers (beta-adrenergic blocking agts) Other (See Comments)     Can not go on beta blockers for long period of time - due to taking allergy injections    Morphine     Opioids-meperidine and related     Ciprofloxacin Rash          OBJECTIVE     Observation/Appearance:     Edema. Measured in centimeters.   5/20/2024 5/20/2024    Left Right   2in. Above elbow     2in. Below elbow     Wrist Crease     Figure of 8     MCPs       Edema. Measured in centimeters.   5/20/2024 5/20/2024    Left Right   Index:       P1      PIP     P2      DIP     P3     Long:       P1      PIP     P2      DIP     P3     Ring:       P1                 PIP                P2                  DIP     P3     Small:        P1                 PIP            P2             DIP     P3     Thumb:     Prox. Phalanx     IP     Distal Phalanx         Shoulder ROM. Measured in degrees   5/9/2024 5/9/2024    Left Right   Shoulder Flexion 30 A  105 P  135   Shoulder Abduction 40 WNL    Shoulder Extension      Shoulder IR Belt  Belt    Shoulder ER         Elbow and Wrist ROM. Measured in degrees.   5/20/2024 5/20/2024    Left Right   Elbow Ext/Flex     Supination/Pronation     Wrist Ext/Flex     Wrist RD/UD       Hand ROM. Measured in degrees.   5/20/2024 5/20/2024    Left Right        Index: MP                 PIP                    DIP                SOOD          Long:  MP                PIP                DIP                SOOD          Ring:   MP                PIP                DIP                SOOD          Small:  MP                 PIP                 DIP                SOOD          Thumb: MP                  IP         Rad ADD/ABD         Pal ADD/ABD            Opposition          Strength (Dynamometer) and Pinch " Strength (Pinch Gauge)  Measured in pounds.   5/20/2024 5/20/2024    Left Right   Rung II 29/34 25/21   Key Pinch     3pt Pinch     2pt Pinch       Sensation   5/9/2024 5/9/2024    Left Right   [place]     Normal 1.65-2.83     Diminished Light Touch 3.22-3.61     Diminished Protective 3.84-4.31     Loss of Protective 4.56-6.65     Untestable >6.65     2 Point Discrimination     Static     Dynamic       Manual Muscle Test   5/20/2024 5/20/2024    Left Right   Wrist Extension      Wrist Flexion     Radial Deviation     Ulnar Deviation     Supination     Pronation     Elbow Extension     Elbow Flexion       Special Tests  Thumb CMC Grind Test    Finkelstein's Test    Phalen's Test    Tinel's Test    Carlos's Test    Chinook-Littler Test    Digital Collateral Stress Test    ORL Test    Froment's Sign    Pinch OK Sign    Lis's Sign     Egawa Sign     Clamp Sign     SL Ballottement Test    LT Ballottement Test    Scaphoid/WatsonTest    Linscheid's Test    Metacarpal Stress Test    Piano Key Test    ECU Synergy Test    Ulnar Compression Test    TFCC Load Test    Ulnocarpal Stress Test    Midcarpal Shift Test    Pisiform Boost Test    Elbow Flexion Test    Scratch Collapse Test    Tennis Elbow Test    Resisted Middle Finger Extension Test    Mills Test    Chair Test    Biceps Squeeze Test    Biceps Hook Test    Milking Test    Press Up Manuever    PLRI Test    Valgus Stress Test    Varus Stress Test    Spurling Test    Cervical Distraction Test    ULNTT - General    ULNTT - Median Nerve    ULNTT - Radial Nerve    ULNTT - Ulnar Nerve         Limitation/Restriction for FOTO initial eval; shoulder  Survey    Therapist reviewed FOTO scores for Jenniffer Jimenez on 5/9/2024.   FOTO documents entered into [employer] - see Media section.    Limitation Score: %         Treatment   Total Treatment time (time-based codes) separate from Evaluation: 20 minutes    Jenniffer received the treatments listed below:     Supervised  modalities after being cleared for contradictions: Hot Pack - 10 minutes       Therapeutic exercises to develop ROM for 10 minutes, including:  - table slides flexion  - table slides scaption   -     Patient Education and Home Exercises      Education provided:   - HEP    Written Home Exercises Provided: Patient instructed to cont prior HEP.  Exercises were reviewed and Jenniffer was able to demonstrate them prior to the end of the session.  Jenniffer demonstrated good  understanding of the education provided. See EMR under Patient Instructions for exercises provided during therapy sessions.     Pt was advised to perform these exercises free of pain, and to stop performing them if pain occurs.    Patient/Family Education: role of OT, goals for OT, scheduling/cancellations - pt verbalized understanding. Discussed insurance limitations with patient.    ASSESSMENT       Jenniffer Jimenez is a 89 y.o. female referred to outpatient occupational therapy and presents with a medical diagnosis of shoulder pain.  Patient presents with the following therapy deficits: Decreased ROM, Decreased  strength, Decreased muscle strength, Decreased functional hand use, Increased pain, and Joint Stiffness and demonstrates limitations as described in the chart below. Following medical record review it is determined that pt will benefit from occupational therapy services in order to maximize pain free and/or functional use of left shoulder. The following goals were discussed with the patient and patient is in agreement with them as to be addressed in the treatment plan. The patient's rehab potential is Fair.     Anticipated barriers to occupational therapy:   Pt has no cultural, educational or language barriers to learning provided.  Medical Necessity is demonstrated by the following  Occupational Profile/History  Co-morbidities and personal factors that may impact the plan of care [x] LOW: Brief chart review  [] MODERATE: Expanded chart  review   [] HIGH: Extensive chart review    Moderate / High Support Documentation:      Examination  Performance deficits relating to physical, cognitive or psychosocial skills that result in activity limitations and/or participation restrictions  [] LOW: addressing 1-3 Performance deficits  [x] MODERATE: 3-5 Performance deficits  [] HIGH: 5+ Performance deficits (please support below)    Moderate / High Support Documentation:    Physical:  Joint Mobility  Joint Stability  Muscle Power/Strength  Muscle Endurance   Strength  Pinch Strength  Pain    Cognitive:  Attention  Memory    Psychosocial:    Habits  Routines  Rituals     Treatment Options [] LOW: Limited options  [] MODERATE: Several options  [] HIGH: Multiple options      Decision Making/ Complexity Score: low           Goals:   The following goals were discussed with the patient and patient is in agreement with them as to be addressed in the treatment plan.   Long Term Goals (LTGs); to be met by discharge.  Long Term Goals (LTGs); to be met by discharge.  Pt will demo improved  strength by 10 pounds   Pt will improve shoulder ROM by 20 degrees   Pt will report pain level no greater than 1-3/10 during functional daily work and community activities     Short Term Goals (STGs); to be met within 4 weeks ().  Pt will demo improved  strength by 5 pounds   Pt will improve shoulder ROM by 10 degrees   Pt will report pain level no greater than 3-4/10 during light activity           PLAN   Plan of Care Certification: 5/9/2024 to 7/26/2024.     Outpatient Occupational Therapy 1 times weekly for 8 weeks to include the following interventions: Paraffin, Manual therapy/joint mobilizations, Modalities for pain management, Therapeutic exercises/activities., Strengthening, Joint Protection, and Energy Conservation.      Nereida Barksdale, OT      I CERTIFY THE NEED FOR THESE SERVICES FURNISHED UNDER THIS PLAN OF TREATMENT AND WHILE UNDER MY CARE  Physician's  comments:      Physician's Signature: ___________________________________________________

## 2024-05-24 ENCOUNTER — TELEPHONE (OUTPATIENT)
Dept: AUDIOLOGY | Facility: CLINIC | Age: 89
End: 2024-05-24
Payer: MEDICARE

## 2024-05-27 ENCOUNTER — CLINICAL SUPPORT (OUTPATIENT)
Dept: REHABILITATION | Facility: HOSPITAL | Age: 89
End: 2024-05-27
Payer: MEDICARE

## 2024-05-27 DIAGNOSIS — M25.60 DECREASED RANGE OF MOTION: Primary | ICD-10-CM

## 2024-05-27 DIAGNOSIS — M62.81 DECREASED MUSCLE STRENGTH: ICD-10-CM

## 2024-05-27 PROCEDURE — 97110 THERAPEUTIC EXERCISES: CPT

## 2024-05-27 PROCEDURE — 97530 THERAPEUTIC ACTIVITIES: CPT

## 2024-05-27 NOTE — PROGRESS NOTES
"[facility]  Occupational Therapy Treatment Note     Date: 5/27/2024  Name: Jenniffer Jimenez  Clinic Number: [id number]    Therapy Diagnosis:   Encounter Diagnoses   Name Primary?    Decreased range of motion Yes    Decreased muscle strength        Physician: Con Moran Jr., *      Physician Orders:   Medical Diagnosis:   Diagnosis   M25.512 (ICD-10-CM) - Left shoulder pain, unspecified chronicity   M75.102 (ICD-10-CM) - Tear of left supraspinatus tendon      Date of Injury/Onset:  > 2 years   Evaluation Date: 5/9/2024  Insurance Authorization Period Expiration: 12/31/2024  Plan of Care Expiration: 10 weeks; 7/19/2024  Date of Return to MD: TBD  Visit # / Visits authorized: 1 / 1  FOTO: (date and score)        FOTO Lobby Code:      Precautions:  Standard and Fall           Time In: 11:00 AM   Time Out: 12:00 PM  Total Appointment Time (timed & untimed codes): 60 minutes        Subjective     Patient reports: "I lost my sheet of paper. But I was doing the table slides  She was compliant with home exercise program given last session.   Response to previous treatment: first tx   Functional change: none noted to this date       Pain: 0/10  Location: left shoulder      Objective     Objective Measures updated at progress report unless specified.    Shoulder ROM. Measured in degrees    5/9/2024 5/9/2024     Left Right   Shoulder Flexion 30 A  105 P  135   Shoulder Abduction 40 WNL    Shoulder Extension        Shoulder IR Belt  Belt    Shoulder ER             Elbow and Wrist ROM. Measured in degrees.    5/20/2024 5/20/2024     Left Right   Elbow Ext/Flex       Supination/Pronation       Wrist Ext/Flex       Wrist RD/UD          Hand ROM. Measured in degrees.    5/20/2024 5/20/2024     Left Right           Index: MP                   PIP                      DIP                  SOOD               Long:  MP                  PIP                  DIP                  SOOD               Ring:   " MP                  PIP                  DIP                  SOOD               Small:  MP                   PIP                   DIP                  SOOD               Thumb: MP                    IP           Rad ADD/ABD           Pal ADD/ABD              Opposition              Strength (Dynamometer) and Pinch Strength (Pinch Gauge)  Measured in pounds.    5/20/2024 5/20/2024     Left Right   Rung II 29/34 25/21   Key Pinch       3pt Pinch       2pt Pinch          Sensation    5/9/2024 5/9/2024     Left Right   [place]       Normal 1.65-2.83       Diminished Light Touch 3.22-3.61       Diminished Protective 3.84-4.31       Loss of Protective 4.56-6.65       Untestable >6.65       2 Point Discrimination       Static       Dynamic          Manual Muscle Test    5/20/2024 5/20/2024     Left Right   Wrist Extension        Wrist Flexion       Radial Deviation       Ulnar Deviation       Supination       Pronation       Elbow Extension       Elbow Flexion            Limitation/Restriction for FOTO initial eval; shoulder  Survey     Therapist reviewed FOTO scores for Provoshade Jimenez on 5/9/2024.   FOTO documents entered into [employer] - see Media section.     Limitation Score: %          Treatment     [place] received the treatments listed below:          therapeutic exercises to develop endurance for 25 minutes including:  Forward/scaption table slide x 10 reps each   AAROM shoulder flexion OH raises x 10 reps   Shoulder shrugs       therapeutic activities to improve functional performance for 25  minutes, including:  Stacking cones (functional ER + flexion)  Ambulation for safety, contact guard ~       hot pack for 10 minutes to L shoulder.        Patient Education and Home Exercises     Education provided:   - HEP print out  - Progress towards goals     Written Home Exercises Provided: yes.  Exercises were reviewed and [place] was able to demonstrate them prior to the end of the session.  [place] demonstrated  good  understanding of the home exercise program provided. See electronic medical record under Patient Instructions for exercises provided during therapy sessions.       Assessment     Demonstrated good endurance throughout session. However patient grunting during exercises yet verbalizes she is not in pain.      Jenniffer is progressing well towards her goals and there are no updates to goals at this time. Pt prognosis is Good.     Patient will continue to benefit from skilled outpatient occupational therapy to address the deficits listed in the problem list on initial evaluation provide patient/family education and to maximize patient's level of independence in the home and community environment.     Patient's spiritual, cultural and educational needs considered and patient agreeable to plan of care and goals.    Anticipated barriers to occupational therapy:     Goals:  Long Term Goals (LTGs); to be met by discharge.  Long Term Goals (LTGs); to be met by discharge.  Pt will demo improved  strength by 10 pounds   Pt will improve shoulder ROM by 20 degrees   Pt will report pain level no greater than 1-3/10 during functional daily work and community activities      Short Term Goals (STGs); to be met within 4 weeks ().  Pt will demo improved  strength by 5 pounds   Pt will improve shoulder ROM by 10 degrees   Pt will report pain level no greater than 3-4/10 during light activity     Plan     Updates/Grading for next session:     Nereida Barksdale, KASSY   5/27/2024

## 2024-05-28 ENCOUNTER — CLINICAL SUPPORT (OUTPATIENT)
Dept: AUDIOLOGY | Facility: CLINIC | Age: 89
End: 2024-05-28
Payer: MEDICARE

## 2024-05-28 DIAGNOSIS — H90.3 ASYMMETRICAL SENSORINEURAL HEARING LOSS: Primary | ICD-10-CM

## 2024-05-28 PROCEDURE — 99499 UNLISTED E&M SERVICE: CPT | Mod: HCNC,S$GLB,, | Performed by: AUDIOLOGIST

## 2024-05-28 NOTE — PROGRESS NOTES
Hearing Aid Follow-up:    Ms. Abad was seen today for a hearing aid follow-up to  her remade cshell. At Ms. Abad's last appointment she requested her cshell be made with a deeper canal portion. Today, Ms. Abad reported her cshell fits very well and feels comfortable. The new mold protruded from her EAC and was difficult to insert due to the curvy nature of her canal. Ms. Abad will continue to wear her current cshell. She reported she hears pretty well in one-on-one conversations, but has trouble talking with others in the lounge at her assisted living center. We discussed realistic expectations with her degree of hearing loss and hearing aids. We discussed that she may be a candidate for a cochlear implant (she is not currently interested). The hearing aids were cleaned and checked. Overall gain was increased 2 clicks. Ms. Abad reported comfortable sound quality. She will return for f/u in 3 months.     BUNDLED PACKAGE  LEFT:     Novadiol [id number]-NK   Purchase Date: 3/14/2023  Khadijah Rahulclarke   SN: [id number]   Speaker: #1 UP   Service Warranty Exp: 06/11/26   One time courtesy replacement     cShell 4.0 Acrylic   SN: [id number] ACC: [phone]   Service Warranty Exp: 05/07/2024      RIGHT:     RT SN: [id number]   Warranty Exp:  06/11/2026   Small power dome

## 2024-06-05 ENCOUNTER — CLINICAL SUPPORT (OUTPATIENT)
Dept: REHABILITATION | Facility: HOSPITAL | Age: 89
End: 2024-06-05
Payer: MEDICARE

## 2024-06-05 DIAGNOSIS — M25.60 DECREASED RANGE OF MOTION: Primary | ICD-10-CM

## 2024-06-05 DIAGNOSIS — M62.81 DECREASED MUSCLE STRENGTH: ICD-10-CM

## 2024-06-05 PROCEDURE — 97530 THERAPEUTIC ACTIVITIES: CPT

## 2024-06-05 PROCEDURE — 97110 THERAPEUTIC EXERCISES: CPT

## 2024-06-05 NOTE — PROGRESS NOTES
[facility]  Occupational Therapy Treatment Note     Date: 6/5/2024  Name: Jenniffer Jimenez  Clinic Number: [id number]    Therapy Diagnosis:   Encounter Diagnoses   Name Primary?    Decreased range of motion Yes    Decreased muscle strength          Physician: Con Moran Jr., *      Physician Orders:   Medical Diagnosis:   Diagnosis   M25.512 (ICD-10-CM) - Left shoulder pain, unspecified chronicity   M75.102 (ICD-10-CM) - Tear of left supraspinatus tendon      Date of Injury/Onset:  > 2 years   Evaluation Date: 5/9/2024  Insurance Authorization Period Expiration: 12/31/2024  Plan of Care Expiration: 10 weeks; 7/19/2024  Date of Return to MD: TBD  Visit # / Visits authorized: 3 / 10  FOTO: (date and score)        FOTO Lobby Code:      Precautions:  Standard and Fall           Time In: 01:05 PM   Time Out:  02:05 PM  Total Appointment Time (timed & untimed codes): 60 minutes        Subjective     Patient reports: practicing cups  She was compliant with home exercise program given last session.   Response to previous treatment: easier to bring arm to table   Functional change: none noted to this date       Pain: 0/10  Location: left shoulder      Objective     Objective Measures updated at progress report unless specified.    Shoulder ROM. Measured in degrees    5/9/2024 5/9/2024 6/5/2024      Left Right Left    Shoulder Flexion 30 A  105 P  135 40 A  107 P   Shoulder Abduction 40 WNL  38 A   Shoulder Extension         Shoulder IR Belt  Belt  Belt   Shoulder ER              Elbow and Wrist ROM. Measured in degrees.    5/20/2024 5/20/2024     Left Right   Elbow Ext/Flex       Supination/Pronation       Wrist Ext/Flex       Wrist RD/UD          Hand ROM. Measured in degrees.    5/20/2024 5/20/2024     Left Right           Index: MP                   PIP                      DIP                  SOOD               Long:  MP                  PIP                  DIP                  SOOD                Ring:   MP                  PIP                  DIP                  SOOD               Small:  MP                   PIP                   DIP                  SOOD               Thumb: MP                    IP           Rad ADD/ABD           Pal ADD/ABD              Opposition              Strength (Dynamometer) and Pinch Strength (Pinch Gauge)  Measured in pounds.    5/20/2024 5/20/2024     Left Right   Rung II 29/34 25/21   Key Pinch       3pt Pinch       2pt Pinch          Sensation    5/9/2024 5/9/2024     Left Right   [place]       Normal 1.65-2.83       Diminished Light Touch 3.22-3.61       Diminished Protective 3.84-4.31       Loss of Protective 4.56-6.65       Untestable >6.65       2 Point Discrimination       Static       Dynamic          Manual Muscle Test    5/20/2024 5/20/2024     Left Right   Wrist Extension        Wrist Flexion       Radial Deviation       Ulnar Deviation       Supination       Pronation       Elbow Extension       Elbow Flexion            Limitation/Restriction for FOTO initial eval; shoulder  Survey     Therapist reviewed FOTO scores for Jenniffershade Jimenez on 5/9/2024.   FOTO documents entered into [employer] - see Media section.     Limitation Score: %          Treatment     Jenniffer received the treatments listed below:          therapeutic exercises to develop endurance for 25 minutes including:  Forward/scaption table slide x 10 reps each NT  AAROM shoulder flexion OH raises x 10 reps   Shoulder shrugs x 15 reps   Bicep curls x 10 reps   UBE level 1.0 total of 8 min   AAROM 1# dowel ER x 10 reps       therapeutic activities to improve functional performance for 15  minutes, including:  Stacking cones (functional ER + flexion)  Ambulation for safety, contact guard ~       hot pack for 10 minutes to L shoulder.        Patient Education and Home Exercises     Education provided:   - HEP print out  - Progress towards goals     Written Home Exercises Provided:  yes.  Exercises were reviewed and Jenniffer was able to demonstrate them prior to the end of the session.  Jenniffer demonstrated good  understanding of the home exercise program provided. See electronic medical record under Patient Instructions for exercises provided during therapy sessions.       Assessment     Improved endurance today.      Jenniffer is progressing well towards her goals and there are no updates to goals at this time. Pt prognosis is Good.     Patient will continue to benefit from skilled outpatient occupational therapy to address the deficits listed in the problem list on initial evaluation provide patient/family education and to maximize patient's level of independence in the home and community environment.     Patient's spiritual, cultural and educational needs considered and patient agreeable to plan of care and goals.    Anticipated barriers to occupational therapy:     Goals:  Long Term Goals (LTGs); to be met by discharge.  Long Term Goals (LTGs); to be met by discharge.  Pt will demo improved  strength by 10 pounds   Pt will improve shoulder ROM by 20 degrees   Pt will report pain level no greater than 1-3/10 during functional daily work and community activities      Short Term Goals (STGs); to be met within 4 weeks ().  Pt will demo improved  strength by 5 pounds   Pt will improve shoulder ROM by 10 degrees   Pt will report pain level no greater than 3-4/10 during light activity     Plan     Updates/Grading for next session:     Nereida Barksdale OT   6/5/2024

## 2024-06-12 ENCOUNTER — CLINICAL SUPPORT (OUTPATIENT)
Dept: REHABILITATION | Facility: HOSPITAL | Age: 89
End: 2024-06-12
Payer: MEDICARE

## 2024-06-12 DIAGNOSIS — M62.81 DECREASED MUSCLE STRENGTH: ICD-10-CM

## 2024-06-12 DIAGNOSIS — M25.60 DECREASED RANGE OF MOTION: Primary | ICD-10-CM

## 2024-06-12 PROCEDURE — 97140 MANUAL THERAPY 1/> REGIONS: CPT

## 2024-06-12 PROCEDURE — 97110 THERAPEUTIC EXERCISES: CPT

## 2024-06-12 NOTE — PROGRESS NOTES
"    [facility]  Occupational Therapy Treatment Note     Date: 6/12/2024  Name: Jenniffer Jimenez  Clinic Number: [id number]    Therapy Diagnosis:   Encounter Diagnoses   Name Primary?    Decreased range of motion Yes    Decreased muscle strength            Physician: Con Moran Jr., *      Physician Orders:   Medical Diagnosis:   Diagnosis   M25.512 (ICD-10-CM) - Left shoulder pain, unspecified chronicity   M75.102 (ICD-10-CM) - Tear of left supraspinatus tendon      Date of Injury/Onset:  > 2 years   Evaluation Date: 5/9/2024  Insurance Authorization Period Expiration: 12/31/2024  Plan of Care Expiration: 10 weeks; 7/19/2024  Date of Return to MD: TBD  Visit # / Visits authorized: 4 / 10  FOTO: (date and score)        FOTO Lobby Code:      Precautions:  Standard and Fall           Time In: 01:05 PM   Time Out:  02:00  PM  Total Appointment Time (timed & untimed codes): 55 minutes        Subjective     Patient reports: "I've been practicing the other exercises, except for the cups"   She was compliant with home exercise program given last session.   Response to previous treatment: easier to bring arm to table   Functional change: none noted to this date       Pain: 0/10  Location: left shoulder      Objective     Objective Measures updated at progress report unless specified.    Shoulder ROM. Measured in degrees    5/9/2024 5/9/2024 6/5/2024      Left Right Left    Shoulder Flexion 30 A  105 P  135 40 A  107 P   Shoulder Abduction 40 WNL  38 A   Shoulder Extension         Shoulder IR Belt  Belt  Belt   Shoulder ER              Elbow and Wrist ROM. Measured in degrees.    5/20/2024 5/20/2024     Left Right   Elbow Ext/Flex       Supination/Pronation       Wrist Ext/Flex       Wrist RD/UD          Hand ROM. Measured in degrees.    5/20/2024 5/20/2024     Left Right           Index: MP                   PIP                      DIP                  SOOD               Long:  MP          "         PIP                  DIP                  SOOD               Ring:   MP                  PIP                  DIP                  SOOD               Small:  MP                   PIP                   DIP                  SOOD               Thumb: MP                    IP           Rad ADD/ABD           Pal ADD/ABD              Opposition              Strength (Dynamometer) and Pinch Strength (Pinch Gauge)  Measured in pounds.    5/20/2024 5/20/2024     Left Right   Rung II 29/34 25/21   Key Pinch       3pt Pinch       2pt Pinch          Sensation    5/9/2024 5/9/2024     Left Right   [place]       Normal 1.65-2.83       Diminished Light Touch 3.22-3.61       Diminished Protective 3.84-4.31       Loss of Protective 4.56-6.65       Untestable >6.65       2 Point Discrimination       Static       Dynamic          Manual Muscle Test    5/20/2024 5/20/2024     Left Right   Wrist Extension        Wrist Flexion       Radial Deviation       Ulnar Deviation       Supination       Pronation       Elbow Extension       Elbow Flexion            Limitation/Restriction for FOTO initial eval; shoulder  Survey     Therapist reviewed FOTO scores for Jenniffershade Jimenez on 5/9/2024.   FOTO documents entered into [employer] - see Media section.     Limitation Score: %          Treatment     Jenniffer received the treatments listed below:        Manual therapy: 15 minute   STM bicep/deltoid/triceps       therapeutic exercises to develop endurance for 25 minutes including:  Rows with tables slides x 10 reps (3 sets)  Shoulder shrugs x 15 reps   Scap retraction x 10 reps       Bicep curls x 10 reps NT  UBE level 1.0 total of 8 min NT  AAROM 1# dowel ER x 10 reps NT      therapeutic activities to improve functional performance for 15  minutes, including: NT   Stacking cones (functional ER + flexion)  Ambulation for safety, contact guard ~       hot pack for 10 minutes to L shoulder.        Patient Education and Home Exercises      Education provided:   - HEP print out  - Progress towards goals     Written Home Exercises Provided: yes.  Exercises were reviewed and Jenniffer was able to demonstrate them prior to the end of the session.  Jenniffer demonstrated good  understanding of the home exercise program provided. See electronic medical record under Patient Instructions for exercises provided during therapy sessions.       Assessment     Soft tissue tightness noted today during session. Advised to stop doing pulley's at apartment gym to avoid increased subluxation. Pt verbalized during session pain in her R CMC as well as L lower back/hip. This is the first time patient has verbalized this information.     Jenniffer is progressing well towards her goals and there are no updates to goals at this time. Pt prognosis is Good.     Patient will continue to benefit from skilled outpatient occupational therapy to address the deficits listed in the problem list on initial evaluation provide patient/family education and to maximize patient's level of independence in the home and community environment.     Patient's spiritual, cultural and educational needs considered and patient agreeable to plan of care and goals.    Anticipated barriers to occupational therapy:     Goals:  Long Term Goals (LTGs); to be met by discharge.  Long Term Goals (LTGs); to be met by discharge.  Pt will demo improved  strength by 10 pounds   Pt will improve shoulder ROM by 20 degrees   Pt will report pain level no greater than 1-3/10 during functional daily work and community activities      Short Term Goals (STGs); to be met within 4 weeks ().  Pt will demo improved  strength by 5 pounds   Pt will improve shoulder ROM by 10 degrees   Pt will report pain level no greater than 3-4/10 during light activity     Plan     Updates/Grading for next session:     Nereida Barksdale OT   6/12/2024

## 2024-06-17 ENCOUNTER — OUTPATIENT CASE MANAGEMENT (OUTPATIENT)
Dept: ADMINISTRATIVE | Facility: OTHER | Age: 89
End: 2024-06-17
Payer: MEDICARE

## 2024-06-17 RX ORDER — MUPIROCIN 20 MG/G
OINTMENT TOPICAL 3 TIMES DAILY
Qty: 15 G | Refills: 0 | Status: SHIPPED | OUTPATIENT
Start: 2024-06-17 | End: 2024-06-24

## 2024-06-17 NOTE — PROGRESS NOTES
Outpatient Care Management  Plan of Care Follow Up Visit    Patient: Jenniffer Jimenez  MRN: [id number]  Date of Service: 06/17/2024  Completed by: Olive Holder RN  Referral Date: 04/09/2024    Reason for Visit   Patient presents with    OPCM RN Follow Up Call    OPCM Chart Review       Brief Summary:   OPCM RN follow-up call completed.   Continue education on Constipation, CHF, UTI/Urinary incont-- completed  Next Steps: Patient is in agreement to follow-up call on or around 6/20/204-- then close.

## 2024-06-17 NOTE — TELEPHONE ENCOUNTER
----- Message from Olive Holder RN sent at 6/17/2024  3:13 PM CDT -----  Regarding: Pt asking for a refill on Mupirocin (Bactroban) 2%  Dr. Ruiz/Staff:    In my follow-up call to Ms. Jimenez, she is asking for a refill on Mupirocin (Bactroban) 2% oint  applied to both nares TID ordered 1/12/2024 in [place] ED. She she says she has polyps in nose, causing hard crusts to form daily that are difficult to remove with a Q-tip. Makes breathing more difficult. The oint has been helping.     Her PCP appt is not until 7/18/2024 and she'll be out by then of the little she has left.     Please advise Ms. Jimenez. Thank you.     Sincerely,  CHUCKIE Yancey, RN, CCM [facility]  [email or web address]  TEL:  [phone]

## 2024-06-18 ENCOUNTER — OUTPATIENT CASE MANAGEMENT (OUTPATIENT)
Dept: ADMINISTRATIVE | Facility: OTHER | Age: 89
End: 2024-06-18
Payer: MEDICARE

## 2024-06-19 ENCOUNTER — TELEPHONE (OUTPATIENT)
Dept: ORTHOPEDICS | Facility: CLINIC | Age: 89
End: 2024-06-19
Payer: MEDICARE

## 2024-06-19 ENCOUNTER — CLINICAL SUPPORT (OUTPATIENT)
Dept: REHABILITATION | Facility: HOSPITAL | Age: 89
End: 2024-06-19
Payer: MEDICARE

## 2024-06-19 DIAGNOSIS — M25.60 DECREASED RANGE OF MOTION: Primary | ICD-10-CM

## 2024-06-19 DIAGNOSIS — M62.81 DECREASED MUSCLE STRENGTH: ICD-10-CM

## 2024-06-19 PROCEDURE — 97110 THERAPEUTIC EXERCISES: CPT

## 2024-06-19 NOTE — PROGRESS NOTES
Outpatient Care Management  Plan of Care Follow Up Visit    Patient: Jenniffer Jimenez  MRN: [id number]  Date of Service: 06/18/2024  Completed by: Olive Holder RN  Referral Date: 04/09/2024    Reason for Visit   Patient presents with    OPCM RN Follow Up Call    Case Closure       Brief Summary:   Pt made aware of new Bactroban rx at [facility]. Unable to reach patient sooner.   Case closed.

## 2024-06-19 NOTE — PROGRESS NOTES
"  [facility]  Occupational Therapy Discharge Summary      Date: 6/19/2024  Name: Jenniffer Jimenez  Clinic Number: [id number]    Therapy Diagnosis:   Encounter Diagnoses   Name Primary?    Decreased range of motion Yes    Decreased muscle strength        Physician: Con Moran Jr., *      Physician Orders:   Medical Diagnosis:   Diagnosis   M25.512 (ICD-10-CM) - Left shoulder pain, unspecified chronicity   M75.102 (ICD-10-CM) - Tear of left supraspinatus tendon      Date of Injury/Onset:  > 2 years   Evaluation Date: 5/9/2024  Insurance Authorization Period Expiration: 12/31/2024  Plan of Care Expiration: 10 weeks; 7/19/2024  Date of Return to MD: TBD  Visit # / Visits authorized: 5 / 10  FOTO: (date and score)        FOTO [place] Code:      Precautions:  Standard and Fall           Time In: 02:05 PM   Time Out:  03:00  PM  Total Appointment Time (timed & untimed codes): 55 minutes        Subjective     Patient reports: "My hip feels better and my R wrist feels better. I think I can start doing exercises at home"   She was compliant with home exercise program given last session.   Response to previous treatment: easier to bring arm to table   Functional change: none noted to this date       Pain: 0/10  Location: left shoulder      Objective     Objective Measures updated at progress report unless specified.    Shoulder ROM. Measured in degrees    5/9/2024 5/9/2024 6/5/2024      Left Right Left    Shoulder Flexion 30 A  105 P  135 40 A  107 P   Shoulder Abduction 40 WNL  38 A   Shoulder Extension         Shoulder IR Belt  Belt  Belt   Shoulder ER              Elbow and Wrist ROM. Measured in degrees.    5/20/2024 5/20/2024     Left Right   Elbow Ext/Flex       Supination/Pronation       Wrist Ext/Flex       Wrist RD/UD          Hand ROM. Measured in degrees.    5/20/2024 5/20/2024     Left Right           Index: MP                   PIP                      DIP                  SOOD     "           Long:  MP                  PIP                  DIP                  SOOD               Ring:   MP                  PIP                  DIP                  SOOD               Small:  MP                   PIP                   DIP                  SOOD               Thumb: MP                    IP           Rad ADD/ABD           Pal ADD/ABD              Opposition              Strength (Dynamometer) and Pinch Strength (Pinch Gauge)  Measured in pounds.    5/20/2024 5/20/2024 6/19/2024      Left Right Left    Rung II 29/34 25/21 25/30/31   Key Pinch        3pt Pinch        2pt Pinch           Sensation    5/9/2024 5/9/2024     Left Right   [place]       Normal 1.65-2.83       Diminished Light Touch 3.22-3.61       Diminished Protective 3.84-4.31       Loss of Protective 4.56-6.65       Untestable >6.65       2 Point Discrimination       Static       Dynamic          Manual Muscle Test    5/20/2024 5/20/2024     Left Right   Wrist Extension        Wrist Flexion       Radial Deviation       Ulnar Deviation       Supination       Pronation       Elbow Extension       Elbow Flexion            Limitation/Restriction for FOTO initial eval; shoulder  Survey     Therapist reviewed FOTO scores for Jenniffershade Jimenez on 5/9/2024.   FOTO documents entered into [employer] - see Media section.     Limitation Score: %          Treatment     Jenniffer received the treatments listed below:        Therapeutic exercises to develop endurance for 25 minutes including:  Rows with tables slides x 10 reps (3 sets)  Shoulder shrugs x 15 reps   Scap retraction x 10 reps   Bicep curls x 10 reps (2 sets)   UBE level 1.0 total of 8 min   AAROM 1# dowel ER x 10 reps NT      therapeutic activities to improve functional performance for 15  minutes, including:    Stacking cones (functional ER + flexion)  Ambulation for safety, contact guard ~       hot pack for 10 minutes to L shoulder.        Patient Education and Home Exercises      Education provided:   - HEP print out; what exercises to continue   - Progress towards goals     Written Home Exercises Provided: yes.  Exercises were reviewed and Jenniffer was able to demonstrate them prior to the end of the session.  Jenniffer demonstrated good  understanding of the home exercise program provided. See electronic medical record under Patient Instructions for exercises provided during therapy sessions.       Assessment     Pt is being discharged from occupational therapy services. Educated on HEP to continue to help with strengthening of LUE.           Patient's spiritual, cultural and educational needs considered and patient agreeable to plan of care and goals.    Anticipated barriers to occupational therapy:     Goals:  Long Term Goals (LTGs); to be met by discharge.  Long Term Goals (LTGs); to be met by discharge.  Pt will demo improved  strength by 10 pounds not met  Pt will improve shoulder ROM by 20 degrees met   Pt will report pain level no greater than 1-3/10 during functional daily work and community activities met      Short Term Goals (STGs); to be met within 4 weeks ().  Pt will demo improved  strength by 5 pounds not met   Pt will improve shoulder ROM by 10 degrees met   Pt will report pain level no greater than 3-4/10 during light activity met     Plan     Updates/Grading for next session:     Nereida Barksdale OT   6/19/2024

## 2024-06-19 NOTE — TELEPHONE ENCOUNTER
----- Message from Con Moran Jr., MD sent at 6/19/2024  2:38 PM CDT -----  Regarding: FW: reschedule appt tomorrow    ----- Message -----  From: Nereida Barksdale OT  Sent: 6/19/2024   2:25 PM CDT  To: Con Moran Jr., MD  Subject: reschedule appt tomorrow                         Hi Dr. Moran,     Ms. [place] would like to reschedule her appt tomorrow. She told me is very tired and would like a later appt time. Please have office call her!       Thanks so much,   Nereida

## 2024-06-19 NOTE — TELEPHONE ENCOUNTER
Attempted to reach pt in regards to rescheduling appointment. No answer. Unable to lvm- mailbox full.

## 2024-07-02 ENCOUNTER — TELEPHONE (OUTPATIENT)
Dept: CARDIOLOGY | Facility: CLINIC | Age: 89
End: 2024-07-02
Payer: MEDICARE

## 2024-07-02 NOTE — TELEPHONE ENCOUNTER
----- Message from Brent Chapman Jr., MD sent at 7/2/2024  8:22 AM CDT -----  Regarding: RE: possible clot  I do not have a spot to see her today.  She call Dr. Ruiz's office to see if he could see her.  She could go to urgent care if they would not have a spot available  ----- Message -----  From: Sabrina Chapman MA  Sent: 7/2/2024   8:13 AM CDT  To: Brent Chapman Jr., MD  Subject: RE: possible clot                                Appointment or ultrasound  ----- Message -----  From: Jeri Recinos  Sent: 7/2/2024   8:10 AM CDT  To: Adela Kennedy Staff  Subject: possible clot                                    Pls call pt at [phone].  She says she has a hard lump on her leg that is the size of a nickel and it is discolored and painful to the touch and is concerned about a possible clot.  She has had it for 2 days.    Thank you

## 2024-07-02 NOTE — TELEPHONE ENCOUNTER
----- Message from Yuval Jordan sent at 7/2/2024  7:53 AM CDT -----  Contact: Pt [phone] or [phone]  Caller is requesting an earlier appointment then we can schedule.  Caller is requesting a message be sent to the provider.    When is the next available appointment with their provider:  8/2024    Reason for the appointment:  A blood clot on right leg just below the knee and it hurts    Patient preference of timeframe to be scheduled:  Anytime

## 2024-07-08 ENCOUNTER — PATIENT MESSAGE (OUTPATIENT)
Dept: PODIATRY | Facility: CLINIC | Age: 89
End: 2024-07-08
Payer: MEDICARE

## 2024-07-15 ENCOUNTER — LAB VISIT (OUTPATIENT)
Dept: LAB | Facility: HOSPITAL | Age: 89
End: 2024-07-15
Attending: INTERNAL MEDICINE
Payer: MEDICARE

## 2024-07-15 ENCOUNTER — OFFICE VISIT (OUTPATIENT)
Dept: INTERNAL MEDICINE | Facility: CLINIC | Age: 89
End: 2024-07-15
Payer: MEDICARE

## 2024-07-15 VITALS
OXYGEN SATURATION: 97 % | SYSTOLIC BLOOD PRESSURE: 132 MMHG | DIASTOLIC BLOOD PRESSURE: 54 MMHG | TEMPERATURE: 98 F | HEIGHT: 65 IN | HEART RATE: 69 BPM | WEIGHT: 159.38 LBS | BODY MASS INDEX: 26.55 KG/M2

## 2024-07-15 DIAGNOSIS — N18.4 HYPERTENSIVE HEART AND KIDNEY DISEASE WITHOUT HEART FAILURE AND WITH STAGE 4 CHRONIC KIDNEY DISEASE: ICD-10-CM

## 2024-07-15 DIAGNOSIS — I13.10 HYPERTENSIVE HEART AND KIDNEY DISEASE WITHOUT HEART FAILURE AND WITH STAGE 4 CHRONIC KIDNEY DISEASE: ICD-10-CM

## 2024-07-15 DIAGNOSIS — I48.20 CHRONIC A-FIB: Chronic | ICD-10-CM

## 2024-07-15 DIAGNOSIS — E78.00 PURE HYPERCHOLESTEROLEMIA: Chronic | ICD-10-CM

## 2024-07-15 DIAGNOSIS — N30.00 ACUTE CYSTITIS WITHOUT HEMATURIA: ICD-10-CM

## 2024-07-15 DIAGNOSIS — Z00.00 ANNUAL PHYSICAL EXAM: Primary | ICD-10-CM

## 2024-07-15 DIAGNOSIS — R05.9 COUGH, UNSPECIFIED TYPE: ICD-10-CM

## 2024-07-15 DIAGNOSIS — H35.033 HYPERTENSIVE RETINOPATHY OF BOTH EYES: ICD-10-CM

## 2024-07-15 DIAGNOSIS — I10 PRIMARY HYPERTENSION: Chronic | ICD-10-CM

## 2024-07-15 DIAGNOSIS — J34.89 NASAL PAIN: ICD-10-CM

## 2024-07-15 DIAGNOSIS — I70.0 ATHEROSCLEROSIS OF AORTA: Chronic | ICD-10-CM

## 2024-07-15 DIAGNOSIS — I50.32 CHRONIC HEART FAILURE WITH PRESERVED EJECTION FRACTION: Chronic | ICD-10-CM

## 2024-07-15 LAB
ALBUMIN SERPL BCP-MCNC: 4.1 G/DL (ref 3.5–5.2)
ALP SERPL-CCNC: 133 U/L (ref 55–135)
ALT SERPL W/O P-5'-P-CCNC: 12 U/L (ref 10–44)
ANION GAP SERPL CALC-SCNC: 15 MMOL/L (ref 8–16)
AST SERPL-CCNC: 17 U/L (ref 10–40)
BASOPHILS # BLD AUTO: 0.03 K/UL (ref 0–0.2)
BASOPHILS NFR BLD: 0.7 % (ref 0–1.9)
BILIRUB SERPL-MCNC: 1 MG/DL (ref 0.1–1)
BUN SERPL-MCNC: 35 MG/DL (ref 10–30)
CALCIUM SERPL-MCNC: 9.8 MG/DL (ref 8.7–10.5)
CHLORIDE SERPL-SCNC: 102 MMOL/L (ref 95–110)
CHOLEST SERPL-MCNC: 214 MG/DL (ref 120–199)
CHOLEST/HDLC SERPL: 4.8 {RATIO} (ref 2–5)
CO2 SERPL-SCNC: 20 MMOL/L (ref 23–29)
CREAT SERPL-MCNC: 1.5 MG/DL (ref 0.5–1.4)
DIFFERENTIAL METHOD BLD: ABNORMAL
EOSINOPHIL # BLD AUTO: 0.1 K/UL (ref 0–0.5)
EOSINOPHIL NFR BLD: 1.4 % (ref 0–8)
ERYTHROCYTE [DISTWIDTH] IN BLOOD BY AUTOMATED COUNT: 15.6 % (ref 11.5–14.5)
EST. GFR  (NO RACE VARIABLE): 32.5 ML/MIN/1.73 M^2
GLUCOSE SERPL-MCNC: 99 MG/DL (ref 70–110)
HCT VFR BLD AUTO: 35.5 % (ref 37–48.5)
HDLC SERPL-MCNC: 45 MG/DL (ref 40–75)
HDLC SERPL: 21 % (ref 20–50)
HGB BLD-MCNC: 11.4 G/DL (ref 12–16)
IMM GRANULOCYTES # BLD AUTO: 0.02 K/UL (ref 0–0.04)
IMM GRANULOCYTES NFR BLD AUTO: 0.5 % (ref 0–0.5)
LDLC SERPL CALC-MCNC: 148 MG/DL (ref 63–159)
LYMPHOCYTES # BLD AUTO: 0.6 K/UL (ref 1–4.8)
LYMPHOCYTES NFR BLD: 12.7 % (ref 18–48)
MCH RBC QN AUTO: 28.9 PG (ref 27–31)
MCHC RBC AUTO-ENTMCNC: 32.1 G/DL (ref 32–36)
MCV RBC AUTO: 90 FL (ref 82–98)
MONOCYTES # BLD AUTO: 0.4 K/UL (ref 0.3–1)
MONOCYTES NFR BLD: 10 % (ref 4–15)
NEUTROPHILS # BLD AUTO: 3.3 K/UL (ref 1.8–7.7)
NEUTROPHILS NFR BLD: 74.7 % (ref 38–73)
NONHDLC SERPL-MCNC: 169 MG/DL
NRBC BLD-RTO: 0 /100 WBC
PLATELET # BLD AUTO: 190 K/UL (ref 150–450)
PMV BLD AUTO: 12.2 FL (ref 9.2–12.9)
POTASSIUM SERPL-SCNC: 5.3 MMOL/L (ref 3.5–5.1)
PROT SERPL-MCNC: 7.1 G/DL (ref 6–8.4)
RBC # BLD AUTO: 3.95 M/UL (ref 4–5.4)
SODIUM SERPL-SCNC: 137 MMOL/L (ref 136–145)
TRIGL SERPL-MCNC: 105 MG/DL (ref 30–150)
TSH SERPL DL<=0.005 MIU/L-ACNC: 2.28 UIU/ML (ref 0.4–4)
WBC # BLD AUTO: 4.42 K/UL (ref 3.9–12.7)

## 2024-07-15 PROCEDURE — 1159F MED LIST DOCD IN RCRD: CPT | Mod: HCNC,CPTII,S$GLB, | Performed by: INTERNAL MEDICINE

## 2024-07-15 PROCEDURE — 84443 ASSAY THYROID STIM HORMONE: CPT | Mod: HCNC | Performed by: INTERNAL MEDICINE

## 2024-07-15 PROCEDURE — 85025 COMPLETE CBC W/AUTO DIFF WBC: CPT | Mod: HCNC | Performed by: INTERNAL MEDICINE

## 2024-07-15 PROCEDURE — 3288F FALL RISK ASSESSMENT DOCD: CPT | Mod: HCNC,CPTII,S$GLB, | Performed by: INTERNAL MEDICINE

## 2024-07-15 PROCEDURE — 80061 LIPID PANEL: CPT | Mod: HCNC | Performed by: INTERNAL MEDICINE

## 2024-07-15 PROCEDURE — 99999 PR PBB SHADOW E&M-EST. PATIENT-LVL IV: CPT | Mod: PBBFAC,HCNC,, | Performed by: INTERNAL MEDICINE

## 2024-07-15 PROCEDURE — 1101F PT FALLS ASSESS-DOCD LE1/YR: CPT | Mod: HCNC,CPTII,S$GLB, | Performed by: INTERNAL MEDICINE

## 2024-07-15 PROCEDURE — 1160F RVW MEDS BY RX/DR IN RCRD: CPT | Mod: HCNC,CPTII,S$GLB, | Performed by: INTERNAL MEDICINE

## 2024-07-15 PROCEDURE — 80053 COMPREHEN METABOLIC PANEL: CPT | Mod: HCNC | Performed by: INTERNAL MEDICINE

## 2024-07-15 PROCEDURE — 1125F AMNT PAIN NOTED PAIN PRSNT: CPT | Mod: HCNC,CPTII,S$GLB, | Performed by: INTERNAL MEDICINE

## 2024-07-15 PROCEDURE — 99397 PER PM REEVAL EST PAT 65+ YR: CPT | Mod: HCNC,S$GLB,, | Performed by: INTERNAL MEDICINE

## 2024-07-15 PROCEDURE — 36415 COLL VENOUS BLD VENIPUNCTURE: CPT | Mod: HCNC,PO | Performed by: INTERNAL MEDICINE

## 2024-07-15 RX ORDER — MUPIROCIN 20 MG/G
OINTMENT TOPICAL 3 TIMES DAILY
Qty: 15 G | Refills: 2 | Status: SHIPPED | OUTPATIENT
Start: 2024-07-15 | End: 2024-07-22

## 2024-07-15 RX ORDER — AMOXICILLIN AND CLAVULANATE POTASSIUM 875; 125 MG/1; MG/1
1 TABLET, FILM COATED ORAL 2 TIMES DAILY
Qty: 10 TABLET | Refills: 0 | Status: SHIPPED | OUTPATIENT
Start: 2024-07-15 | End: 2024-07-18

## 2024-07-15 RX ORDER — SULFAMETHOXAZOLE AND TRIMETHOPRIM 800; 160 MG/1; MG/1
1 TABLET ORAL 2 TIMES DAILY
Qty: 14 TABLET | Refills: 0 | Status: SHIPPED | OUTPATIENT
Start: 2024-07-15 | End: 2024-07-22

## 2024-07-15 NOTE — PROGRESS NOTES
Subjective:       Patient ID: Jenniffer Jimenez is a 89 y.o. female.    Chief Complaint: Annual Exam    HPI    89 y.o. female here for annual exam.     Cholesterol: needs  Vaccines: Influenza - 2023; Tetanus - 2024; Prevnar 20 - 13 and 23 done; Zoster - 2 done; COVID - 7 done  Eye exam: done last - not sure when. Gets a notice.  Mammogram:  No longer indicated (patient agrees)  Gyn exam: No longer indicated (patient agrees)  Colonoscopy: No longer indicated (patient agrees)  DEXA:  2023, osteoporosis - not on medication - does not want fosamax or boniva.    Exercise: 30 minutes a day to 60 minutes a day.  [facility]  Diet: meals on wheels for lunch. No breakfast on occasion.  Fruit for dinner.    She is concerned she has a UTI. She is uncomfortable in her pelvis and has dysuria.    She complains of stuff in her nose.  She was told she has polyps in her nose and was given mupirocin for this.  She has pain in her nose.  If she skips a day, a crust forms.  It is flakey.  She uses a cue tip to go up her nose.  If she does not it comes back and has pain.  The crust blocks her breathing.    She has been coughing a couple weeks to a month ago.  She is coughing all the time and get mucus up.  It is grey in color.  She drinks 60 ounces a day.  She has a post nasal drip.  It sometimes get caught in the back of her nose and throat.    Past Medical History:   Diagnosis Date    Amblyopia of left eye 04/10/2013    Arthritis     Facet arthropathy, Lumbosacral    Atherosclerosis of aorta     Cataract     Central retinal vein occlusion of left eye     CKD (chronic kidney disease) stage 3, GFR 30-59 ml/min     Diabetes mellitus, type 2     Diabetic polyneuropathy 01/06/2022    Exotropia of both eyes 02/06/2013    recession RSR 5.0mm w/ adj; recession LR os 5.0 w/ adj; resect MR os  4.0mm    Hearing loss     History of resection of small bowel     Hypertensive retinopathy of both eyes     Hypoglycemia      Macular degeneration     OA (osteoarthritis) of shoulder     Right    Osteoporosis     Posterior vitreous detachment of both eyes     Psychiatric problem     Rhinitis     TIA (transient ischemic attack)      Past Surgical History:   Procedure Laterality Date    APPENDECTOMY      CARDIAC CATHETERIZATION      CATARACT EXTRACTION W/  INTRAOCULAR LENS IMPLANT Bilateral      SECTION, CLASSIC      CLOSURE OF LEFT ATRIAL APPENDAGE USING DEVICE N/A 2020    Procedure: Left atrial appendage closure device;  Surgeon: Abundio Curtis MD;  Location: [facility] CATH LAB;  Service: Cardiology;  Laterality: N/A;    HYSTERECTOMY      INNER EAR SURGERY      IRRIGATION AND DEBRIDEMENT OF UPPER EXTREMITY Right 2024    Procedure: IRRIGATION AND DEBRIDEMENT, UPPER EXTREMITY;  Surgeon: Con Moran Jr., MD;  Location: [facility] OR;  Service: Orthopedics;  Laterality: Right;    JOINT REPLACEMENT      LEFT KNEE REPLACEMENT IN 2013 -    OOPHORECTOMY      SINUS SURGERY      STRABISMUS SURGERY  13    RSR recession 5 mm, LLR recession 5 mm and LMR resection 4mm    STRABISMUS SURGERY  2014    recess LR OD 6mm    TONSILLECTOMY      watchman surgery N/A 2020     Social History     Socioeconomic History    Marital status:    Occupational History    Occupation: retired   Tobacco Use    Smoking status: Former     Current packs/day: 0.00     Types: Cigarettes     Quit date: 10/29/1982     Years since quittin.7     Passive exposure: Never    Smokeless tobacco: Never   Substance and Sexual Activity    Alcohol use: Not Currently     Alcohol/week: 2.0 standard drinks of alcohol     Types: 2 Shots of liquor per week     Comment: rare    Drug use: No    Sexual activity: Yes   Other Topics Concern    Are you pregnant or think you may be? No    Breast-feeding No     Social Determinants of Health     Financial Resource Strain: High Risk (4/15/2024)    Overall Financial Resource Strain (CARDIA)     Difficulty of Paying  "Living Expenses: Very hard   Food Insecurity: Food Insecurity Present (4/15/2024)    Hunger Vital Sign     Worried About Running Out of Food in the Last Year: Sometimes true     Ran Out of Food in the Last Year: Sometimes true   Transportation Needs: No Transportation Needs (4/15/2024)    PRAPARE - Transportation     Lack of Transportation (Medical): No     Lack of Transportation (Non-Medical): No   Physical Activity: Sufficiently Active (4/15/2024)    Exercise Vital Sign     Days of Exercise per Week: 7 days     Minutes of Exercise per Session: 90 min   Recent Concern: Physical Activity - Inactive (1/29/2024)    Exercise Vital Sign     Days of Exercise per Week: 0 days     Minutes of Exercise per Session: 0 min   Stress: No Stress Concern Present (4/15/2024)    [employer] - Occupational Stress Questionnaire     Feeling of Stress : Only a little   Recent Concern: Stress - Stress Concern Present (1/29/2024)    [employer] - Occupational Stress Questionnaire     Feeling of Stress : To some extent   Housing Stability: Low Risk  (4/15/2024)    Housing Stability Vital Sign     Unable to Pay for Housing in the Last Year: No     Homeless in the Last Year: No     Review of patient's allergies indicates:   Allergen Reactions    Opioids - morphine analogues Other (See Comments)     Bowel issues; bowel obstruction    Tizanidine Other (See Comments)     "Lips were numb,  Almost passed out."    Tramadol Hallucinations    Beta-blockers (beta-adrenergic blocking agts) Other (See Comments)     Can not go on beta blockers for long period of time - due to taking allergy injections    Morphine     Opioids-meperidine and related     Ciprofloxacin Rash     Mrs. Jenniffer Jimenez had no medications administered during this visit.    Review of Systems      Objective:      Physical Exam  Vitals reviewed.   Constitutional:       Appearance: She is well-developed.   HENT:      Head: " Normocephalic and atraumatic.      Mouth/Throat:      Pharynx: No oropharyngeal exudate.   Eyes:      General: No scleral icterus.        Right eye: No discharge.         Left eye: No discharge.      Pupils: Pupils are equal, round, and reactive to light.   Neck:      Thyroid: No thyromegaly.      Trachea: No tracheal deviation.   Cardiovascular:      Rate and Rhythm: Normal rate and regular rhythm.      Heart sounds: Normal heart sounds. No murmur heard.     No friction rub. No gallop.   Pulmonary:      Effort: Pulmonary effort is normal. No respiratory distress.      Breath sounds: Normal breath sounds. No wheezing or rales.   Chest:      Chest wall: No tenderness.   Abdominal:      General: Bowel sounds are normal. There is no distension.      Palpations: Abdomen is soft. There is no mass.      Tenderness: There is no abdominal tenderness. There is no guarding or rebound.   Musculoskeletal:         General: No tenderness. Normal range of motion.      Cervical back: Normal range of motion and neck supple.   Skin:     General: Skin is warm and dry.      Coloration: Skin is not pale.      Findings: No erythema or rash.   Neurological:      Mental Status: She is alert and oriented to person, place, and time.   Psychiatric:         Behavior: Behavior normal.         Assessment:       1. Annual physical exam    2. Primary hypertension  - CBC Auto Differential; Future  - Comprehensive Metabolic Panel; Future  - TSH; Future  - Lipid Panel; Future  - Microalbumin/creatinine urine ratio    3. Hypertensive retinopathy of both eyes    4. Pure hypercholesterolemia    5. Atherosclerosis of aorta    6. Chronic heart failure with preserved ejection fraction    7. Chronic a-fib    8. 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 Acute cystitis without hematuria  - Urinalysis, Reflex to Urine Culture Urine, Clean Catch; Future  - Urine Culture High Risk    10. Nasal  pain  - Ambulatory referral/consult to ENT; Future    11. Cough, unspecified type      Plan:       1. Check CBC, CMP, TSH, lipids, urine microalbumin.  Discussed diet and exercise.  Discussed vaccines.    2/. Continue Aldactone 25 mg.    4/5.  Continue pravastatin 40 mg p.o.   6. Continue aspirin 81 mg, Lasix 20 mg, Aldactone 25 mg p.o.   Seven.  Monitor.    Eight.  Monitor   9. Check urinalysis with reflex to culture.  Bactrim ds b.i.d. x 7 days prescribed.  10. Refer to ENT.  Bactrim prescribed.  Continue mupirocin.  11. Think this is allergies, but hold off on using medication, because of sore in nose.

## 2024-07-18 RX ORDER — CEFDINIR 300 MG/1
300 CAPSULE ORAL 2 TIMES DAILY
Qty: 10 CAPSULE | Refills: 0 | Status: SHIPPED | OUTPATIENT
Start: 2024-07-18 | End: 2024-07-23

## 2024-08-02 ENCOUNTER — OFFICE VISIT (OUTPATIENT)
Dept: OTOLARYNGOLOGY | Facility: CLINIC | Age: 89
End: 2024-08-02
Payer: MEDICARE

## 2024-08-02 VITALS
WEIGHT: 163.81 LBS | BODY MASS INDEX: 27.26 KG/M2 | SYSTOLIC BLOOD PRESSURE: 131 MMHG | DIASTOLIC BLOOD PRESSURE: 67 MMHG | HEART RATE: 73 BPM

## 2024-08-02 DIAGNOSIS — J34.89 NASAL CRUSTING: ICD-10-CM

## 2024-08-02 PROCEDURE — 99999 PR PBB SHADOW E&M-EST. PATIENT-LVL III: CPT | Mod: PBBFAC,,, | Performed by: OTOLARYNGOLOGY

## 2024-08-02 NOTE — PROGRESS NOTES
Ms. Jimenez     Vitals:    24 0940   BP: 131/67   Pulse: 73       Chief Complaint:  Nose Problem       HPI:   is a 89-year-old white female who presents referred by Dr. Ruiz for nasal pain.  Upon discussion with her she denies any nasal pain but is having some nasal crusting.  She has been on Bactrim and mupirocin ointment, the latter of which she has been using for the past 7 months.  She denies any nasal obstruction or chronic recurrent sinus disease.    SNOT22- 26 NOSE- 45    Review of Systems:  Constitutional:   weight loss or weight gain: Negative  Allergy/Immunologic:   Negative  Nasal Congestion/Obstruction:   Positive for occasional nasal congestion and runny nose as well as postnasal drip  Nosebleeds:   Negative  Sinus infections:   Negative  Headache/Facial Pain:   Negative  Snoring/BRENDAN:   Negative  Throat: Infections/Pain:   Negative  Hoarseness/Speech Disturbance:   Negative  Trauma Hx:  Negative    Cardiovascular:  M/I Angina: Negative, positive for chronic AFib  Hypertension:  Positive  Endocrine:    DM/Steroids:  Positive reported history of prediabetes  GI:   Dysphagia/Reflux: Negative  :   GYN Pregnancy: Negative, positive for stage 3 chronic kidney disease  Renal:   Dialysis: Negative  Lymphatic:   Neck Mass/Lymphadenopathy: Negative  Muscoloskeletal:   Negative  Hematologic:   Bleeding Disorders/Anemia: Negative  Neurologic:    Cranial/Neuralgia: Negative  Pulmonary:   Asthma/SOB/Cough: Negative  Skin Disorders: Negative    Past Medical/Surgical/Family/Social History:    ENT Surgery: Negative  Occupational Exposure: Negative   Problems: Negative  Cancer: Negative    Past Family History:   Family history of Cancer: Negative    Past Social History:   Tobacco: Nonsmoker   Alcohol:  Once a month Social Drinker      Allergies and medications: Reviewed per med card.    Physical Examination:  Ears:   External auditory canals:  Clear   Hearing: Grossly intact   Tympanic Membranes:  Clear  Nose:   External: Normal   Intranasal:  Her septum is straight, turbinates 1 to 2+, nasal airway clear.  No evidence of masses lesions polyps or purulence intranasally.  No evidence of nasal vestibulitis or crusting.  Mouth:   Intraorally: Lips, teeth, and gums: Normal   Oropharynx: Normal   Mucosa: Normal   Tongue: Normal  Throat:      Palate: Normal palate with elevation   Tonsils:  Absent   Posterior Pharynx: Normal  Fiberoptic exam: Not performed  Head/Face:     Inspection: Normal and atraumatic   Palpation/Percussion: Non tender   Facial strength: Normal and symmetric   Salivary glands: Normal  Neck: Supple  Thyroid: No masses  Lymphatics: No nodes  Respiratory:   Effort: Normal  Eyes:   Ocular Mobility: Normal   Vision: Grossly intact  Neuro/Psych:   Cranial Nerves: Grossly Intact   Orientation: Normal   Mood/Affect: Normal      Assessment/Plan:  I have discussed my findings with her in detail as well as my recommendations for treatment.  I have suggested that she discontinue her mupirocin ointment at this time and only use this if she develops redness and tenderness in her nasal vestibule.  I have suggested that she use nasal saline sprays for moisturization and a slight bit of Vaseline.  She states that she has been applying her mupirocin ointment on a Q-tip pushing this as far she can intranasally.  I have have told her to absolutely discontinue this and only apply Vaseline just inside her nasal vestibule no further than the cotton portion of the Q-tip.  I have given her a card for Rohith Goldstein are ENT PA should she require any further follow-up.

## 2024-08-06 ENCOUNTER — HOSPITAL ENCOUNTER (EMERGENCY)
Facility: HOSPITAL | Age: 89
Discharge: HOME | End: 2024-08-06
Attending: STUDENT IN AN ORGANIZED HEALTH CARE EDUCATION/TRAINING PROGRAM
Payer: MEDICARE

## 2024-08-06 VITALS
HEIGHT: 65 IN | BODY MASS INDEX: 26.66 KG/M2 | HEART RATE: 75 BPM | DIASTOLIC BLOOD PRESSURE: 65 MMHG | SYSTOLIC BLOOD PRESSURE: 148 MMHG | RESPIRATION RATE: 20 BRPM | OXYGEN SATURATION: 96 % | WEIGHT: 160 LBS | TEMPERATURE: 99 F

## 2024-08-06 DIAGNOSIS — N18.32 STAGE 3B CHRONIC KIDNEY DISEASE: ICD-10-CM

## 2024-08-06 DIAGNOSIS — N39.0 RECURRENT UTI: Primary | ICD-10-CM

## 2024-08-06 LAB
ALBUMIN SERPL BCP-MCNC: 4.3 G/DL (ref 3.5–5.2)
ALP SERPL-CCNC: 133 U/L (ref 55–135)
ALT SERPL W/O P-5'-P-CCNC: 8 U/L (ref 10–44)
ANION GAP SERPL CALC-SCNC: 14 MMOL/L (ref 8–16)
AST SERPL-CCNC: 15 U/L (ref 10–40)
BACTERIA #/AREA URNS HPF: ABNORMAL /HPF
BASOPHILS # BLD AUTO: 0.04 K/UL (ref 0–0.2)
BASOPHILS NFR BLD: 0.8 % (ref 0–1.9)
BILIRUB SERPL-MCNC: 0.6 MG/DL (ref 0.1–1)
BILIRUB UR QL STRIP: NEGATIVE
BUN SERPL-MCNC: 40 MG/DL (ref 10–30)
CALCIUM SERPL-MCNC: 9.7 MG/DL (ref 8.7–10.5)
CHLORIDE SERPL-SCNC: 103 MMOL/L (ref 95–110)
CLARITY UR: ABNORMAL
CO2 SERPL-SCNC: 20 MMOL/L (ref 23–29)
COLOR UR: YELLOW
CREAT SERPL-MCNC: 1.5 MG/DL (ref 0.5–1.4)
DIFFERENTIAL METHOD BLD: ABNORMAL
EOSINOPHIL # BLD AUTO: 0.1 K/UL (ref 0–0.5)
EOSINOPHIL NFR BLD: 1.5 % (ref 0–8)
ERYTHROCYTE [DISTWIDTH] IN BLOOD BY AUTOMATED COUNT: 16.2 % (ref 11.5–14.5)
EST. GFR  (NO RACE VARIABLE): 32 ML/MIN/1.73 M^2
GLUCOSE SERPL-MCNC: 94 MG/DL (ref 70–110)
GLUCOSE UR QL STRIP: NEGATIVE
HCT VFR BLD AUTO: 30.6 % (ref 37–48.5)
HGB BLD-MCNC: 10.5 G/DL (ref 12–16)
HGB UR QL STRIP: NEGATIVE
IMM GRANULOCYTES # BLD AUTO: 0.04 K/UL (ref 0–0.04)
IMM GRANULOCYTES NFR BLD AUTO: 0.8 % (ref 0–0.5)
KETONES UR QL STRIP: NEGATIVE
LEUKOCYTE ESTERASE UR QL STRIP: ABNORMAL
LYMPHOCYTES # BLD AUTO: 0.7 K/UL (ref 1–4.8)
LYMPHOCYTES NFR BLD: 13.8 % (ref 18–48)
MCH RBC QN AUTO: 29.4 PG (ref 27–31)
MCHC RBC AUTO-ENTMCNC: 34.3 G/DL (ref 32–36)
MCV RBC AUTO: 86 FL (ref 82–98)
MICROSCOPIC COMMENT: ABNORMAL
MONOCYTES # BLD AUTO: 0.5 K/UL (ref 0.3–1)
MONOCYTES NFR BLD: 10.4 % (ref 4–15)
NEUTROPHILS # BLD AUTO: 3.5 K/UL (ref 1.8–7.7)
NEUTROPHILS NFR BLD: 72.7 % (ref 38–73)
NITRITE UR QL STRIP: NEGATIVE
NRBC BLD-RTO: 0 /100 WBC
PH UR STRIP: 6 [PH] (ref 5–8)
PLATELET # BLD AUTO: 193 K/UL (ref 150–450)
PMV BLD AUTO: 10.6 FL (ref 9.2–12.9)
POTASSIUM SERPL-SCNC: 4.9 MMOL/L (ref 3.5–5.1)
PROT SERPL-MCNC: 7.4 G/DL (ref 6–8.4)
PROT UR QL STRIP: NEGATIVE
RBC # BLD AUTO: 3.57 M/UL (ref 4–5.4)
RBC #/AREA URNS HPF: 4 /HPF (ref 0–4)
SODIUM SERPL-SCNC: 137 MMOL/L (ref 136–145)
SP GR UR STRIP: 1 (ref 1–1.03)
SQUAMOUS #/AREA URNS HPF: 1 /HPF
URN SPEC COLLECT METH UR: ABNORMAL
UROBILINOGEN UR STRIP-ACNC: NEGATIVE EU/DL
WBC # BLD AUTO: 4.8 K/UL (ref 3.9–12.7)
WBC #/AREA URNS HPF: >100 /HPF (ref 0–5)
WBC CLUMPS URNS QL MICRO: ABNORMAL

## 2024-08-06 PROCEDURE — 81000 URINALYSIS NONAUTO W/SCOPE: CPT | Mod: HCNC | Performed by: NURSE PRACTITIONER

## 2024-08-06 PROCEDURE — 85025 COMPLETE CBC W/AUTO DIFF WBC: CPT | Mod: HCNC

## 2024-08-06 PROCEDURE — 80053 COMPREHEN METABOLIC PANEL: CPT | Mod: HCNC

## 2024-08-06 PROCEDURE — 25000003 PHARM REV CODE 250: Mod: HCNC | Performed by: STUDENT IN AN ORGANIZED HEALTH CARE EDUCATION/TRAINING PROGRAM

## 2024-08-06 PROCEDURE — 87186 SC STD MICRODIL/AGAR DIL: CPT | Mod: HCNC | Performed by: NURSE PRACTITIONER

## 2024-08-06 PROCEDURE — 87088 URINE BACTERIA CULTURE: CPT | Mod: HCNC | Performed by: NURSE PRACTITIONER

## 2024-08-06 PROCEDURE — 87086 URINE CULTURE/COLONY COUNT: CPT | Mod: HCNC | Performed by: NURSE PRACTITIONER

## 2024-08-06 PROCEDURE — 99283 EMERGENCY DEPT VISIT LOW MDM: CPT | Mod: HCNC

## 2024-08-06 RX ORDER — CEFUROXIME AXETIL 250 MG/1
250 TABLET ORAL EVERY 12 HOURS
Qty: 10 TABLET | Refills: 0 | Status: SHIPPED | OUTPATIENT
Start: 2024-08-06 | End: 2024-08-11

## 2024-08-06 RX ORDER — CEFUROXIME AXETIL 250 MG/1
250 TABLET ORAL
Status: COMPLETED | OUTPATIENT
Start: 2024-08-06 | End: 2024-08-06

## 2024-08-06 RX ADMIN — CEFUROXIME AXETIL 250 MG: 250 TABLET ORAL at 03:08

## 2024-08-06 NOTE — ED NOTES
Pt reports dx bladder infection 2 weeks ago. Pt states her sx have not improved with initial antibiotics. Pt reports pain on urination with cloudy urine. Pt denies fever, sob, cp. Pt aaox4

## 2024-08-08 LAB — BACTERIA UR CULT: ABNORMAL

## 2024-08-08 NOTE — ED PROVIDER NOTES
NAME:  Jenniffer Jimenez  CSN:     [id number]  MRN:    [id number]  ADMIT DATE: 8/6/2024        eMERGENCY dEPARTMENT eNCOUnter    CHIEF COMPLAINT    Chief Complaint   Patient presents with    Cystitis     Pt reports dx bladder infection 2 weeks ago. Pt states her sx have not improved with initial antibiotics. Pt reports pain on urination with cloudy urine.        HPI    Jenniffer Jimenez is a 89 y.o. female with a past medical history of  has a past medical history of Amblyopia of left eye (04/10/2013), Arthritis, Atherosclerosis of aorta, Cataract, Central retinal vein occlusion of left eye, CKD (chronic kidney disease) stage 3, GFR 30-59 ml/min, Diabetes mellitus, type 2, Diabetic polyneuropathy (01/06/2022), Exotropia of both eyes (02/06/2013), Hearing loss, History of resection of small bowel, Hypertensive retinopathy of both eyes, Hypoglycemia, Macular degeneration, OA (osteoarthritis) of shoulder, Osteoporosis, Posterior vitreous detachment of both eyes, Psychiatric problem, Rhinitis, and TIA (transient ischemic attack).     she presents to the ED due to ongoing dysuria.  About 2 weeks ago she was treated for UTI.  She states she was taking amoxicillin and Bactrim at the same time.  She felt that she got better for a few days but she now has suprapubic pain, urinary frequency and dysuria again.  Denies any fevers, nausea or vomiting.  Notes history of recurrent UTI and she was sure that she was another 1 at this time.  She does live independently.  She was here with her friend who works as a nurse practitioner.    HPI       PAST MEDICAL HISTORY  Past Medical History:   Diagnosis Date    Amblyopia of left eye 04/10/2013    Arthritis     Facet arthropathy, Lumbosacral    Atherosclerosis of aorta     Cataract     Central retinal vein occlusion of left eye     CKD (chronic kidney disease) stage 3, GFR 30-59 ml/min     Diabetes mellitus, type 2     Diabetic polyneuropathy 01/06/2022    Exotropia of both eyes  2013    recession RSR 5.0mm w/ adj; recession LR os 5.0 w/ adj; resect MR os  4.0mm    Hearing loss     History of resection of small bowel     Hypertensive retinopathy of both eyes     Hypoglycemia     Macular degeneration     OA (osteoarthritis) of shoulder     Right    Osteoporosis     Posterior vitreous detachment of both eyes     Psychiatric problem     Rhinitis     TIA (transient ischemic attack)        SURGICAL HISTORY    Past Surgical History:   Procedure Laterality Date    APPENDECTOMY      CARDIAC CATHETERIZATION      CATARACT EXTRACTION W/  INTRAOCULAR LENS IMPLANT Bilateral      SECTION, CLASSIC      CLOSURE OF LEFT ATRIAL APPENDAGE USING DEVICE N/A 2020    Procedure: Left atrial appendage closure device;  Surgeon: Abundio Curtis MD;  Location: [facility] CATH LAB;  Service: Cardiology;  Laterality: N/A;    HYSTERECTOMY      INNER EAR SURGERY      IRRIGATION AND DEBRIDEMENT OF UPPER EXTREMITY Right 2024    Procedure: IRRIGATION AND DEBRIDEMENT, UPPER EXTREMITY;  Surgeon: Con Moran Jr., MD;  Location: [facility] OR;  Service: Orthopedics;  Laterality: Right;    JOINT REPLACEMENT      LEFT KNEE REPLACEMENT IN  -    OOPHORECTOMY      SINUS SURGERY      STRABISMUS SURGERY  13    RSR recession 5 mm, LLR recession 5 mm and LMR resection 4mm    STRABISMUS SURGERY  2014    recess LR OD 6mm    TONSILLECTOMY      watchman surgery N/A 2020       FAMILY HISTORY    Family History   Problem Relation Name Age of Onset    Hypertension Mother      Hypertension Father      Liver disease Sister      Diabetes Sister      Heart disease Sister      Diabetes Brother      Breast cancer Maternal Aunt      No Known Problems Maternal Uncle      No Known Problems Paternal Aunt      No Known Problems Paternal Uncle      No Known Problems Maternal Grandmother      No Known Problems Maternal Grandfather      No Known Problems Paternal Grandmother      No Known Problems Paternal Grandfather       No Known Problems Daughter      No Known Problems Son      Heart disease Sister      No Known Problems Son      No Known Problems Son      Cancer Neg Hx          in first degree relatives    Melanoma Neg Hx      Psoriasis Neg Hx      Lupus Neg Hx      Amblyopia Neg Hx      Blindness Neg Hx      Cataracts Neg Hx      Glaucoma Neg Hx      Macular degeneration Neg Hx      Retinal detachment Neg Hx      Strabismus Neg Hx      Stroke Neg Hx      Thyroid disease Neg Hx         SOCIAL HISTORY    Social History     Socioeconomic History    Marital status:    Occupational History    Occupation: retired   Tobacco Use    Smoking status: Former     Current packs/day: 0.00     Types: Cigarettes     Quit date: 10/29/1982     Years since quittin.8     Passive exposure: Never    Smokeless tobacco: Never   Substance and Sexual Activity    Alcohol use: Not Currently     Alcohol/week: 2.0 standard drinks of alcohol     Types: 2 Shots of liquor per week     Comment: rare    Drug use: No    Sexual activity: Yes   Other Topics Concern    Are you pregnant or think you may be? No    Breast-feeding No     Social Determinants of Health     Financial Resource Strain: High Risk (4/15/2024)    Overall Financial Resource Strain (CARDIA)     Difficulty of Paying Living Expenses: Very hard   Food Insecurity: Food Insecurity Present (4/15/2024)    Hunger Vital Sign     Worried About Running Out of Food in the Last Year: Sometimes true     Ran Out of Food in the Last Year: Sometimes true   Transportation Needs: No Transportation Needs (4/15/2024)    PRAPARE - Transportation     Lack of Transportation (Medical): No     Lack of Transportation (Non-Medical): No   Physical Activity: Sufficiently Active (4/15/2024)    Exercise Vital Sign     Days of Exercise per Week: 7 days     Minutes of Exercise per Session: 90 min   Recent Concern: Physical Activity - Inactive (2024)    Exercise Vital Sign     Days of Exercise per Week: 0 days      Minutes of Exercise per Session: 0 min   Stress: No Stress Concern Present (4/15/2024)    [employer] - Occupational Stress Questionnaire     Feeling of Stress : Only a little   Recent Concern: Stress - Stress Concern Present (1/29/2024)    [employer] - Occupational Stress Questionnaire     Feeling of Stress : To some extent   Housing Stability: Low Risk  (4/15/2024)    Housing Stability Vital Sign     Unable to Pay for Housing in the Last Year: No     Homeless in the Last Year: No       MEDICATIONS  Current Outpatient Medications   Medication Instructions    albuterol-ipratropium (DUO-NEB) 2.5 mg-0.5 mg/3 mL nebulizer solution 3 mLs, Nebulization, Every 6 hours PRN, Rescue    aspirin (ECOTRIN) 81 mg, Oral, Daily    cefUROXime (CEFTIN) 250 mg, Oral, Every 12 hours    clotrimazole-betamethasone 1-0.05% (LOTRISONE) cream Topical (Top), 2 times daily    COMIRNATY 2023-24, 12Y UP,,PF, 30 mcg/0.3 mL inection     diclofenac sodium (VOLTAREN) 2 g, Topical (Top), 4 times daily, Apply to right hip    ferrous sulfate 325 mg, Oral, Daily    FLUAD QUAD 2023-24,65Y UP,,PF, 60 mcg (15 mcg x 4)/0.5 mL Syrg     furosemide (LASIX) 20 MG tablet Take 2 tablets in the a.m. for weights 155-159, take 2 tablets twice a day for weights 160 over, none for weights less than 155    gabapentin (NEURONTIN) 300 mg, Oral, Nightly    miconazole (MICOTIN) 2 % cream     miconazole nitrate 2% (MICOTIN) 2 % Oint Topical (Top), 2 times daily, Coloplast Clear Antifungal ointment- (green top)- nursing to apply to perineum, inner thighs, pannus, and buttocks BID    ondansetron (ZOFRAN-ODT) 8 mg, Oral, 3 times daily PRN    pravastatin (PRAVACHOL) 40 mg, Oral, Daily    propylene glycol (SYSTANE COMPLETE OPHT) Ophthalmic    psyllium 0.52 gram capsule 3 capsules, Oral, 3 times daily PRN    silver sulfADIAZINE 1% (SILVADENE) 1 % cream Apply to RLE skin breakdown twice daily    spironolactone  "(ALDACTONE) 25 mg, Oral, 2 times daily    triamcinolone acetonide 0.1% (KENALOG) 0.1 % ointment Topical (Top), 2 times daily, Use to affected areas for up to 2 weeks then take a 1 week break or decrease to 3 times weekly. Do not apply to groin or face. Apply to red scaly areas on the legs and arms    vitamin E 400 Units, Oral, Daily       ALLERGIES    Review of patient's allergies indicates:   Allergen Reactions    Opioids - morphine analogues Other (See Comments)     Bowel issues; bowel obstruction    Tizanidine Other (See Comments)     "Lips were numb,  Almost passed out."    Tramadol Hallucinations    Beta-blockers (beta-adrenergic blocking agts) Other (See Comments)     Can not go on beta blockers for long period of time - due to taking allergy injections    Morphine     Opioids-meperidine and related     Ciprofloxacin Rash         REVIEW OF SYSTEMS   Review of Systems       PHYSICAL EXAM    Reviewed Triage Note    VITAL SIGNS:   ED Triage Vitals [08/06/24 1304]   Enc Vitals Group      BP (!) 148/65      Pulse 75      Resp 20      Temp 98.7 °F (37.1 °C)      Temp Source Oral      SpO2 96 %      Weight 160 lb      Height 5' 5"      Head Circumference       Peak Flow       Pain Score       Pain Loc       Pain Education       Exclude from Growth Chart        No data found.    Physical Exam    Nursing note and vitals reviewed.  Constitutional: She appears well-developed and well-nourished.   HENT:   Head: Normocephalic and atraumatic.   Eyes: EOM are normal. Pupils are equal, round, and reactive to light.   Neck: Neck supple.   Normal range of motion.  Cardiovascular:  Normal rate and regular rhythm.           Pulmonary/Chest: Breath sounds normal. No respiratory distress.   Abdominal: Abdomen is soft. There is abdominal tenderness (suprapubiv).   Musculoskeletal:         General: Normal range of motion.      Cervical back: Normal range of motion and neck supple.     Neurological: She is alert and oriented to " person, place, and time.   Skin: Skin is warm and dry.   Psychiatric: She has a normal mood and affect.            EKG     Interpreted by EM physician if performed:               LABS  Pertinent labs reviewed. (See chart for details)   Labs Reviewed   CULTURE, URINE - Abnormal       Result Value    Urine Culture, Routine   (*)     Value: GRAM NEGATIVE PIO  50,000 - 99,999 cfu/ml  Identification and susceptibility pending      Narrative:     Specimen Source->Urine   URINALYSIS, REFLEX TO URINE CULTURE - Abnormal    Specimen UA Urine, Clean Catch      Color, UA Yellow      Appearance, UA Hazy (*)     pH, UA 6.0      Specific Gravity, UA 1.005      Protein, UA Negative      Glucose, UA Negative      Ketones, UA Negative      Bilirubin (UA) Negative      Occult Blood UA Negative      Nitrite, UA Negative      Urobilinogen, UA Negative      Leukocytes, UA 3+ (*)     Narrative:     Specimen Source->Urine   CBC W/ AUTO DIFFERENTIAL - Abnormal    WBC 4.80      RBC 3.57 (*)     Hemoglobin 10.5 (*)     Hematocrit 30.6 (*)     MCV 86      MCH 29.4      MCHC 34.3      RDW 16.2 (*)     Platelets 193      MPV 10.6      Immature Granulocytes 0.8 (*)     Gran # (ANC) 3.5      Immature Grans (Abs) 0.04      Lymph # 0.7 (*)     Mono # 0.5      Eos # 0.1      Baso # 0.04      nRBC 0      Gran % 72.7      Lymph % 13.8 (*)     Mono % 10.4      Eosinophil % 1.5      Basophil % 0.8      Differential Method Automated     COMPREHENSIVE METABOLIC PANEL - Abnormal    Sodium 137      Potassium 4.9      Chloride 103      CO2 20 (*)     Glucose 94      BUN 40 (*)     Creatinine 1.5 (*)     Calcium 9.7      Total Protein 7.4      Albumin 4.3      Total Bilirubin 0.6      Alkaline Phosphatase 133      AST 15      ALT 8 (*)     eGFR 32 (*)     Anion Gap 14     URINALYSIS MICROSCOPIC - Abnormal    RBC, UA 4      WBC, UA >100 (*)     WBC Clumps, UA Few (*)     Bacteria Occasional      Squam Epithel, UA 1      Microscopic Comment SEE COMMENT       Narrative:     Specimen Source->Urine         RADIOLOGY          Imaging Results    None           PROCEDURES    Procedures      ED COURSE & MEDICAL DECISION MAKING    Pertinent Labs & Imaging studies reviewed. (See chart for details and specific orders.)          Summary of review of records:   Recently treated for UTI.  Initially treated with Bactrim and switch to cefdinir.  Previous cultures show resistance to ampicillin, gentamicin and Bactrim.    Medical Decision Making  Amount and/or Complexity of Data Reviewed  Labs: ordered.    Risk  Prescription drug management.      Jenniffer Jimenez is a 89 y.o. female who presents for urinary symptoms.    Differential includes but is not limited to UTI, doubt pyelo, JOSHUA or ureteral stone at this time.          Medications   cefUROXime tablet 250 mg (250 mg Oral Given 8/6/24 1559)       ED Course as of 08/08/24 0304   Tue Aug 06, 2024   1302 Per last Urine Culture-  Bacteria is resistant to Bactrim.  I am switching you to cefdinir 300 mg twice a day for 5 days. [DT]      ED Course User Index  [DT] Lennie Perez, NP     CBC does not show leukocytosis.  CMP shows baseline CKD.  Urine consistent with likely infection.  Will treat with Ceftin and neurology referral placed for recurrent UTI.  Strict return precautions provided and all questions addressed.        FINAL IMPRESSION    Final diagnoses:  [N39.0] Recurrent UTI (Primary)  [N18.32] Stage 3b chronic kidney disease       DISPOSITION  Patient discharge in stable condition        ED Prescriptions       Medication Sig Dispense Start Date End Date Auth. Provider    cefUROXime (CEFTIN) 250 MG tablet Take 1 tablet (250 mg total) by mouth every 12 (twelve) hours. for 5 days 10 tablet 8/6/2024 8/11/2024 Courtney Martinez, DO          Follow-up Information       Follow up With Specialties Details Why Contact Info    Reed Ruiz MD Internal Medicine Schedule an appointment as soon as possible for a visit   [address] [facility]  [place] [address]  [phone]      [facility] Emergency Medicine  As needed, If symptoms worsen [address]  [facility] [address] [phone]              DISCLAIMER: This note was prepared with [employer] voice recognition transcription software. Garbled syntax, mangled pronouns, and other bizarre constructions may be attributed to that software system.             Courtney Martinez, DO  08/08/24 0308

## 2024-08-15 ENCOUNTER — OFFICE VISIT (OUTPATIENT)
Dept: CARDIOLOGY | Facility: CLINIC | Age: 89
End: 2024-08-15
Payer: MEDICARE

## 2024-08-15 VITALS
SYSTOLIC BLOOD PRESSURE: 126 MMHG | WEIGHT: 168 LBS | HEIGHT: 65 IN | DIASTOLIC BLOOD PRESSURE: 70 MMHG | BODY MASS INDEX: 27.99 KG/M2 | HEART RATE: 79 BPM

## 2024-08-15 DIAGNOSIS — I13.10 HYPERTENSIVE HEART AND KIDNEY DISEASE WITHOUT HEART FAILURE AND WITH STAGE 4 CHRONIC KIDNEY DISEASE: ICD-10-CM

## 2024-08-15 DIAGNOSIS — Z95.818 PRESENCE OF WATCHMAN LEFT ATRIAL APPENDAGE CLOSURE DEVICE: Chronic | ICD-10-CM

## 2024-08-15 DIAGNOSIS — I50.32 CHRONIC HEART FAILURE WITH PRESERVED EJECTION FRACTION: Primary | Chronic | ICD-10-CM

## 2024-08-15 DIAGNOSIS — N18.4 HYPERTENSIVE HEART AND KIDNEY DISEASE WITHOUT HEART FAILURE AND WITH STAGE 4 CHRONIC KIDNEY DISEASE: ICD-10-CM

## 2024-08-15 DIAGNOSIS — I48.20 CHRONIC A-FIB: Chronic | ICD-10-CM

## 2024-08-15 DIAGNOSIS — I27.22 PULMONARY HYPERTENSION DUE TO LEFT VENTRICULAR DIASTOLIC DYSFUNCTION: ICD-10-CM

## 2024-08-15 DIAGNOSIS — Z29.89 INDICATION PRESENT FOR ENDOCARDITIS PROPHYLAXIS: ICD-10-CM

## 2024-08-15 DIAGNOSIS — E78.00 PURE HYPERCHOLESTEROLEMIA: Chronic | ICD-10-CM

## 2024-08-15 DIAGNOSIS — I10 PRIMARY HYPERTENSION: Chronic | ICD-10-CM

## 2024-08-15 PROCEDURE — 1101F PT FALLS ASSESS-DOCD LE1/YR: CPT | Mod: CPTII,S$GLB,, | Performed by: INTERNAL MEDICINE

## 2024-08-15 PROCEDURE — 1126F AMNT PAIN NOTED NONE PRSNT: CPT | Mod: CPTII,S$GLB,, | Performed by: INTERNAL MEDICINE

## 2024-08-15 PROCEDURE — 3288F FALL RISK ASSESSMENT DOCD: CPT | Mod: CPTII,S$GLB,, | Performed by: INTERNAL MEDICINE

## 2024-08-15 PROCEDURE — 99999 PR PBB SHADOW E&M-EST. PATIENT-LVL III: CPT | Mod: PBBFAC,,, | Performed by: INTERNAL MEDICINE

## 2024-08-15 PROCEDURE — 99214 OFFICE O/P EST MOD 30 MIN: CPT | Mod: S$GLB,,, | Performed by: INTERNAL MEDICINE

## 2024-08-15 PROCEDURE — 1159F MED LIST DOCD IN RCRD: CPT | Mod: CPTII,S$GLB,, | Performed by: INTERNAL MEDICINE

## 2024-08-15 NOTE — PROGRESS NOTES
Subjective:   08/15/2024     Patient ID:  Jenniffer Jimenez is a 89 y.o. female who presents for evaulation of Follow-up      Patient doing well with no chest pains tightness, PND or orthopnea.  She does have mild lower extremity edema.  Takes Lasix as noted in the medication list adjusted by weight.  Has not wanted to try SG LT 2 inhibitor therapy in the past    Has chronic atrial fibrillation, no anticoagulation post Watchman.      Prior note reviewed:  Comes in for follow-up of heart failure.   Recently hospitalized with a cat bite infection.  Doing well from the heart standpoint.  Blood pressures have been normal at home.    Has chronic atrial fibrillation, rate controlled, no anticoagulation post Watchman.  She is status placement of a left atrial appendage occluder 2021, November.  Follow up in [facility], note made that she should take SBE prophylaxis for life, she has not been doing that.    She has previous had diastolic heart failure, shortness of breath not increasing, she was on spironolactone, caused hyperkalemia , but now back on spironolactone and tolerating well.. She had been placed on SG LT 2 inhibitor therapy in the past, had not wish to take though.  Worried about side effects.    Assessment and Plan:    Chronic heart failure with preserved ejection fraction  Comments:  continue volume management  Did not wish to try SG LT 2 inhibitor therapy unless problems would occur with current therapy  Orders:  -     Echo; Future; Expected date: 08/15/2024    Chronic a-fib  Comments:  accepted as rhythm of choice    Presence of Watchman left atrial appendage closure device  Comments:  endocarditis prophylaxis required    Primary hypertension  Comments:  generally better controlled, check blood pressures at home    Pure hypercholesterolemia  Comments:  on moderate dose statin therapy    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  Comments:  ECHO with return to clinic  in 6 months    Atherosclerosis of aorta            Follow up in about 6 months (around 8/15/2024).        Echocardiogram 4/23:   · The left ventricle is normal in size with concentric hypertrophy and normal systolic function.  · The estimated ejection fraction is 55%.  · Indeterminate left ventricular diastolic function.  · With normal right ventricular systolic function.  · Severe left atrial enlargement.  · Severe right atrial enlargement.  · Mild mitral regurgitation.  · Mild to moderate tricuspid regurgitation.  · There is pulmonary hypertension.  · The estimated PA systolic pressure is 56 mmHg.  · Elevated central venous pressure (15 mmHg).       Prior note:   She comes in for cardiac follow-up.  Lots of problems recently with lower extremity cellulitis.  Hospitalized for that, developed delirium.  Subsequent to our last visit, she developed dehydration was seen in the emergency room.  Her weight has increased since then from 149 lb to 155 lb.  She is not had chest pains or tightness    She has not had increasing chest pains or tightness, shortness of breath, PND or orthopnea.    Comes in today for heart failure follow-up.  I would put her on Jardiance last visit, she never took it, worried about side effects.  She had been off of spironolactone also, review of the chart shows 1 episode of hyperkalemia, now on the low side.      Her swelling has been stable, wounds of the lower extremity are healing, no exertional chest pains tightness or shortness of breath, no PND orthopnea.    Hypercholesterolemia is on present, on moderate dose statin therapy.       She does have chronic atrial fibrillation is status post Watchman device as noted above.  Her heart rate is well controlled.    Prior note:    She presented for evaluation of chest pain in February of 2021.  She had noted increasing swelling in her legs.  She had taken additional furosemide.  She is status post Watchman device placement in December of 2020.  She  is off anticoagulation, now on aspirin only.  She has not had bleeding problems.  She does not have a history of chest pain.  Cardiac catheterization performed many years ago was normal.  Echocardiography continue show evidence for diastolic dysfunction with increased PA pressures and increased left atrial size.  She has not had increasing shortness of breath.  Her weight has been stable, taking additional furosemide and aldosterone is needed.  Laboratory work recently has shown stable renal to and potassium.          She was felt to have acute on chronic chronic diastolic heart failure.  My recommendations then were:  1.  Discontinue potassium chloride  2.  Take furosemide 20 mg twice a day to get rid of fluid, decrease to once a day when fluid improved.  3.  Take spironolactone 25 mg 1 with each furosemide.  4.  Call me if you have recurrent chest pain.  5.  Weigh yourself daily.  Record this and bring it in with your next visit     I saw her 2 weeks after the initial presentation.  She was markedly improved.  Recommendations then were:    1.  Decrease Lasix/furosemide and Aldactone/spironolactone to 1 a day as long as weight stays below 156 lb, okay to increase to twice a day for increased weight.  2.  Please do a blood test for me today to check kidney function.    She developed an episode of lightheadedness and her weight target was changed to 160lb.      Echocardiogram from January 2022:  · The left ventricle is normal in size with normal systolic function.  · The estimated ejection fraction is 65%.  · Severe left atrial enlargement.  · Grade III left ventricular diastolic dysfunction.  · The estimated PA systolic pressure is 51 mmHg.  · Normal right ventricular size with normal right ventricular systolic function.  · There is mild pulmonary hypertension.  · Intermediate central venous pressure (8 mmHg).  · Moderate right atrial enlargement.  · Moderate tricuspid regurgitation.  · Mild mitral regurgitation.        Recent carotid ultrasound shows mild bilateral disease:  There is 20-39% right Internal Carotid Stenosis.  There is 40-49% left Internal Carotid Stenosis.      Congestive Heart Failure  Pertinent negatives include no chest pain, claudication, near-syncope or palpitations.   Hypertension  Associated symptoms include headaches. Pertinent negatives include no chest pain, orthopnea, palpitations or PND.   Hyperlipidemia  Pertinent negatives include no chest pain.   Follow-up  Associated symptoms include headaches and numbness. Pertinent negatives include no chest pain.       Patient Active Problem List   Diagnosis    Pure hypercholesterolemia    Pseudophakia, both eyes    Stable central retinal vein occlusion of left eye    Chronic rhinitis    Hearing loss, sensorineural    Primary hypertension    Arthritis    Exotropia of both eyes    Debility    Gait instability    Meniere's disease    Facet arthropathy, lumbosacral    Lumbar spinal stenosis    Hypermetropia of right eye    Chronic a-fib    Stage 3b chronic kidney disease    Atherosclerosis of aorta    History of TIA (transient ischemic attack)    Osteoporosis    Primary osteoarthritis of right shoulder    History of strabismus surgery    Encounter for long-term (current) use of antiplatelets/antithrombotics    Prediabetes    Refractive error    Posterior vitreous detachment, bilateral    Dry eye syndrome    Overweight (BMI 25.0-29.9)    Risk for falls    OAB (overactive bladder)    Venous stasis dermatitis    BRVO (branch retinal vein occlusion)    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 eye with active choroidal neovascularization    Hypertensive retinopathy of both eyes    Unspecified inflammatory spondylopathy, lumbosacral region    Presence of Watchman left atrial appendage closure device    Normocytic anemia    Cervicogenic headache    Senile purpura    Skin tear of forearm without complication, left, initial encounter    Tear of left  "supraspinatus tendon    Weakness of shoulder    Impaired function of upper extremity    Normocytic normochromic anemia    Open wound of left lower extremity    Unable to care for self    Hyperkalemia    Trochanteric bursitis of right hip    Intertrigo    Generalized skin eruption due to medication taken internally    High anion gap metabolic acidosis    Delirium due to medical condition with behavioral disturbance    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    Polyneuropathy in diseases classified elsewhere    Microcytic anemia    Heart failure with preserved ejection fraction    Uses walker    Cat bite    Infected cat bite of hand, right, sequela    Pancytopenia    Decreased range of motion    Decreased muscle strength    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    Indication present for endocarditis prophylaxis        Review of patient's allergies indicates:   Allergen Reactions    Opioids - morphine analogues Other (See Comments)     Bowel issues; bowel obstruction    Tizanidine Other (See Comments)     "Lips were numb,  Almost passed out."    Tramadol Hallucinations    Beta-blockers (beta-adrenergic blocking agts) Other (See Comments)     Can not go on beta blockers for long period of time - due to taking allergy injections    Morphine     Opioids-meperidine and related     Ciprofloxacin Rash         Current Outpatient Medications:     aspirin (ECOTRIN) 81 MG EC tablet, Take 81 mg by mouth once daily., Disp: , Rfl:     FLUAD QUAD 2023-24,65Y UP,,PF, 60 mcg (15 mcg x 4)/0.5 mL Syrg, , Disp: , Rfl:     furosemide (LASIX) 20 MG tablet, Take 2 tablets in the a.m. for weights 155-159, take 2 tablets twice a day for weights 160 over, none for weights less than 155, Disp: 120 tablet, Rfl: 11    pravastatin (PRAVACHOL) 40 MG tablet, Take 1 tablet (40 mg total) by mouth once daily., Disp: 90 tablet, Rfl: 3    propylene glycol (SYSTANE " COMPLETE OPHT), Apply to eye., Disp: , Rfl:     psyllium 0.52 gram capsule, Take 3 capsules by mouth 3 (three) times daily as needed (constipation)., Disp: , Rfl:     silver sulfADIAZINE 1% (SILVADENE) 1 % cream, Apply to RLE skin breakdown twice daily, Disp: , Rfl:     spironolactone (ALDACTONE) 25 MG tablet, Take 1 tablet (25 mg total) by mouth 2 (two) times daily., Disp: 180 tablet, Rfl: 3    vitamin E 400 UNIT capsule, Take 400 Units by mouth once daily., Disp: , Rfl:      Objective:   Review of Systems   Cardiovascular:  Negative for chest pain, claudication, cyanosis, dyspnea on exertion, irregular heartbeat, leg swelling, near-syncope, orthopnea, palpitations, paroxysmal nocturnal dyspnea and syncope.   Musculoskeletal:  Positive for arthritis and muscle cramps.   Gastrointestinal:  Positive for constipation and diarrhea.   Neurological:  Positive for headaches, loss of balance, numbness and paresthesias.       Vitals:    08/15/24 1332   BP: 126/70   Pulse: 79               Physical Exam  Vitals reviewed.   Constitutional:       General: She is not in acute distress.     Appearance: She is well-developed.   HENT:      Head: Normocephalic and atraumatic.      Nose: Nose normal.   Eyes:      Conjunctiva/sclera: Conjunctivae normal.      Pupils: Pupils are equal, round, and reactive to light.   Neck:      Vascular: Hepatojugular reflux and JVD present. No carotid bruit.   Cardiovascular:      Rate and Rhythm: Normal rate and regular rhythm.      Pulses: Intact distal pulses.      Heart sounds: Normal heart sounds. No murmur heard.     No friction rub. No gallop.      Comments: Jugular venous pressure is normal.  No edema  Pulmonary:      Effort: Pulmonary effort is normal. No respiratory distress.      Breath sounds: Normal breath sounds. No wheezing or rales.   Chest:      Chest wall: No tenderness.   Abdominal:      General: Bowel sounds are normal. There is no distension.      Palpations: Abdomen is soft.       Tenderness: There is no abdominal tenderness.   Musculoskeletal:         General: No tenderness or deformity. Normal range of motion.      Cervical back: Normal range of motion and neck supple.      Right lower leg: Edema (Trace) present.      Left lower leg: Edema (trace) present.      Comments: Both lower extremities below the knee wrapped   Skin:     General: Skin is warm and dry.      Findings: No erythema or rash.   Neurological:      Mental Status: She is alert and oriented to person, place, and time.      Cranial Nerves: No cranial nerve deficit.      Motor: No abnormal muscle tone.      Coordination: Coordination normal.   Psychiatric:         Behavior: Behavior normal.         Thought Content: Thought content normal.         Judgment: Judgment normal.          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  Return to clinic as needed

## 2024-08-19 ENCOUNTER — PATIENT MESSAGE (OUTPATIENT)
Dept: PODIATRY | Facility: CLINIC | Age: 89
End: 2024-08-19
Payer: MEDICARE

## 2024-08-21 ENCOUNTER — OFFICE VISIT (OUTPATIENT)
Dept: PODIATRY | Facility: CLINIC | Age: 89
End: 2024-08-21
Payer: MEDICARE

## 2024-08-21 VITALS
BODY MASS INDEX: 27.92 KG/M2 | WEIGHT: 167.56 LBS | HEART RATE: 66 BPM | SYSTOLIC BLOOD PRESSURE: 123 MMHG | DIASTOLIC BLOOD PRESSURE: 69 MMHG | RESPIRATION RATE: 18 BRPM | HEIGHT: 65 IN

## 2024-08-21 DIAGNOSIS — L60.9 NAIL ABNORMALITIES: ICD-10-CM

## 2024-08-21 DIAGNOSIS — G60.9 IDIOPATHIC PERIPHERAL NEUROPATHY: Primary | ICD-10-CM

## 2024-08-21 PROCEDURE — 99999 PR PBB SHADOW E&M-EST. PATIENT-LVL III: CPT | Mod: PBBFAC,HCNC,, | Performed by: PODIATRIST

## 2024-08-21 NOTE — PROGRESS NOTES
Subjective:     Patient ID: Jenniffer Jimenez is a 89 y.o. female.    Chief Complaint: Routine Foot Care (Nail care )    Jenniffer is a 89 y.o. female who presents to the clinic for evaluation and treatment of high risk feet. Jenniffer has a past medical history of Amblyopia of left eye (04/10/2013), Arthritis, Atherosclerosis of aorta, Cataract, Central retinal vein occlusion of left eye, CKD (chronic kidney disease) stage 3, GFR 30-59 ml/min, Diabetes mellitus, type 2, Diabetic polyneuropathy (01/06/2022), Exotropia of both eyes (02/06/2013), Hearing loss, History of resection of small bowel, Hypertensive retinopathy of both eyes, Hypoglycemia, Macular degeneration, OA (osteoarthritis) of shoulder, Osteoporosis, Posterior vitreous detachment of both eyes, Psychiatric problem, Rhinitis, and TIA (transient ischemic attack). The patient's chief complaint is long, thick toenails. This patient has documented high risk feet requiring routine maintenance secondary to diabetes mellitis and those secondary complications of diabetes, as mentioned..    PCP: Reed Ruiz MD    Date Last Seen by PCP:   Chief Complaint   Patient presents with    Routine Foot Care     Nail care      Hemoglobin A1C   Date Value Ref Range Status   04/10/2024 5.9 (H) 4.0 - 5.6 % Final     Comment:     ADA Screening Guidelines:  5.7-6.4%  Consistent with prediabetes  >or=6.5%  Consistent with diabetes    High levels of fetal hemoglobin interfere with the HbA1C  assay. Heterozygous hemoglobin variants (HbS, HgC, etc)do  not significantly interfere with this assay.   However, presence of multiple variants may affect accuracy.     04/28/2023 5.9 (H) 4.0 - 5.6 % Final     Comment:     ADA Screening Guidelines:  5.7-6.4%  Consistent with prediabetes  >or=6.5%  Consistent with diabetes    High levels of fetal hemoglobin interfere with the HbA1C  assay. Heterozygous hemoglobin variants (HbS, HgC, etc)do  not significantly interfere with this assay.  "  However, presence of multiple variants may affect accuracy.     01/26/2023 6.0 (H) 4.0 - 5.6 % Final     Comment:     ADA Screening Guidelines:  5.7-6.4%  Consistent with prediabetes  >or=6.5%  Consistent with diabetes    High levels of fetal hemoglobin interfere with the HbA1C  assay. Heterozygous hemoglobin variants (HbS, HgC, etc)do  not significantly interfere with this assay.   However, presence of multiple variants may affect accuracy.         Review of Systems   Constitutional: Negative for chills, decreased appetite and fever.   Cardiovascular:  Negative for leg swelling.   Skin:  Positive for dry skin and nail changes. Negative for flushing, itching and poor wound healing.   Musculoskeletal:  Positive for arthritis. Negative for joint pain, joint swelling and myalgias.   Gastrointestinal:  Negative for nausea and vomiting.   Neurological:  Negative for loss of balance, numbness and paresthesias.        Objective:     Vitals:    08/21/24 1344   BP: 123/69   Pulse: 66   Resp: 18   Weight: 76 kg (167 lb 8.8 oz)   Height: 5' 5" (1.651 m)   PainSc: 0-No pain        Physical Exam  Vitals reviewed.   Constitutional:       General: She is not in acute distress.     Appearance: She is well-developed. She is not toxic-appearing.   Cardiovascular:      Pulses:           Dorsalis pedis pulses are 1+ on the right side and 1+ on the left side.        Posterior tibial pulses are 1+ on the right side and 1+ on the left side.      Comments: Toes are cool to touch. Feet are warm proximally.There is decreased digital hair. Skin is atrophic, slightly hyperpigmented, and mildly edematous      Musculoskeletal:         General: No tenderness. Normal range of motion.      Comments: Adequate joint range of motion without pain, limitation, nor crepitation Bilateral feet and ankle joints. Muscle strength is 5/5 in all groups bilaterally.         Skin:     General: Skin is warm and dry.      Coloration: Skin is not ashen or " jaundiced.      Findings: No bruising, ecchymosis, erythema, lesion or rash.      Comments: Nails x10 are elongated by  2-4mm's, thickened by 2-5 mm's, dystrophic, and are darkened in  coloration . Xerosis Bilaterally. No open lesions noted.       Neurological:      Mental Status: She is alert and oriented to person, place, and time.      Comments: [place] 5.07 monofilamant testing is diminished Senthil feet. Sharp/dull sensation diminished Bilaterally. Light touch absent Bilaterally.       Psychiatric:         Behavior: Behavior normal.         Assessment:      Encounter Diagnoses   Name Primary?    Idiopathic peripheral neuropathy Yes    Nail abnormalities      Plan:     Jenniffer was seen today for routine foot care.    Diagnoses and all orders for this visit:    Idiopathic peripheral neuropathy    Nail abnormalities      I counseled the patient on her conditions, their implications and medical management.        - Shoe inspection.  Foot Education. Patient instructed on proper foot hygeine. We discussed wearing proper shoe gear, daily foot inspections, never walking without protective shoe gear, never putting sharp instruments to feet, routine podiatric nail visits every 2-3 months.      - With patient's permission, nails were aggressively reduced and debrided x 10 to their soft tissue attachment mechanically  removing all offending nail and debris. Patient relates relief following the procedure. She will continue to monitor the areas daily, inspect her feet, wear protective shoe gear when ambulatory, moisturizer to maintain skin integrity and follow in this office in approximately 2-3 months, sooner p.r.n.

## 2024-08-26 ENCOUNTER — OFFICE VISIT (OUTPATIENT)
Dept: UROLOGY | Facility: CLINIC | Age: 89
End: 2024-08-26
Payer: MEDICARE

## 2024-08-26 ENCOUNTER — TELEPHONE (OUTPATIENT)
Dept: UROLOGY | Facility: CLINIC | Age: 89
End: 2024-08-26
Payer: MEDICARE

## 2024-08-26 ENCOUNTER — HOSPITAL ENCOUNTER (OUTPATIENT)
Dept: RADIOLOGY | Facility: HOSPITAL | Age: 89
Discharge: HOME | End: 2024-08-26
Attending: UROLOGY
Payer: MEDICARE

## 2024-08-26 VITALS
WEIGHT: 165.13 LBS | SYSTOLIC BLOOD PRESSURE: 131 MMHG | HEART RATE: 73 BPM | BODY MASS INDEX: 27.48 KG/M2 | DIASTOLIC BLOOD PRESSURE: 87 MMHG

## 2024-08-26 DIAGNOSIS — R33.9 INCOMPLETE BLADDER EMPTYING: Primary | ICD-10-CM

## 2024-08-26 DIAGNOSIS — N39.0 RECURRENT UTI: ICD-10-CM

## 2024-08-26 PROCEDURE — 87077 CULTURE AEROBIC IDENTIFY: CPT | Performed by: UROLOGY

## 2024-08-26 PROCEDURE — 1101F PT FALLS ASSESS-DOCD LE1/YR: CPT | Mod: CPTII,S$GLB,, | Performed by: UROLOGY

## 2024-08-26 PROCEDURE — 87186 SC STD MICRODIL/AGAR DIL: CPT | Performed by: UROLOGY

## 2024-08-26 PROCEDURE — 87086 URINE CULTURE/COLONY COUNT: CPT | Performed by: UROLOGY

## 2024-08-26 PROCEDURE — 51701 INSERT BLADDER CATHETER: CPT | Mod: S$GLB,,, | Performed by: UROLOGY

## 2024-08-26 PROCEDURE — 76775 US EXAM ABDO BACK WALL LIM: CPT | Mod: TC

## 2024-08-26 PROCEDURE — 99999 PR PBB SHADOW E&M-EST. PATIENT-LVL III: CPT | Mod: PBBFAC,,, | Performed by: UROLOGY

## 2024-08-26 PROCEDURE — 1126F AMNT PAIN NOTED NONE PRSNT: CPT | Mod: CPTII,S$GLB,, | Performed by: UROLOGY

## 2024-08-26 PROCEDURE — 99204 OFFICE O/P NEW MOD 45 MIN: CPT | Mod: 25,S$GLB,, | Performed by: UROLOGY

## 2024-08-26 PROCEDURE — 81002 URINALYSIS NONAUTO W/O SCOPE: CPT | Mod: S$GLB,,, | Performed by: UROLOGY

## 2024-08-26 PROCEDURE — 87088 URINE BACTERIA CULTURE: CPT | Performed by: UROLOGY

## 2024-08-26 PROCEDURE — 3288F FALL RISK ASSESSMENT DOCD: CPT | Mod: CPTII,S$GLB,, | Performed by: UROLOGY

## 2024-08-26 PROCEDURE — 1159F MED LIST DOCD IN RCRD: CPT | Mod: CPTII,S$GLB,, | Performed by: UROLOGY

## 2024-08-26 RX ORDER — DOXYCYCLINE HYCLATE 100 MG
100 TABLET ORAL ONCE
OUTPATIENT
Start: 2024-08-26 | End: 2024-08-26

## 2024-08-26 RX ORDER — LIDOCAINE HYDROCHLORIDE 20 MG/ML
JELLY TOPICAL ONCE
OUTPATIENT
Start: 2024-08-26 | End: 2024-08-26

## 2024-08-26 NOTE — PROGRESS NOTES
CHIEF COMPLAINT:    Mrs. Jimenez is a 89 y.o. female presenting for a consultation at the request of Dr. Reed Ruiz. Patient presents with recurrent UTI.    PRESENTING ILLNESS:    Jenniffer Jimenez is a 89 y.o. female who presents with a history of recurrent UTI.  Has had a laminectomy    Onset of symptoms:  several years    Culture Proven (yes/no):  yes    Symptoms:  suprapubic tenderness and urgency to urinate.  No fevers or chills.      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     Risk factors:     Bowel movements:  tendency to having constipation   Menopausal status:    Using Vaginal Estrogen:  none    Reason why not using Vaginal Estrogen:  not prescribed   Sexually active:  none   fluid intake:     Pad use:  uses a pull up.  Started wearing a pull up in rehab, she does not have incontinence.      Preventative measures:   Probiotics:  yes    , hysterectomy, not sexually active, has constipation, takes a stool softener. If she is really constipated, she takes Miralax.   Lives in assisted living at [facility].      REVIEW OF SYSTEMS:    Review of Systems   Constitutional: Negative.    HENT: Negative.     Eyes: Negative.    Respiratory: Negative.     Cardiovascular: Negative.    Gastrointestinal: Negative.    Genitourinary: Negative.    Musculoskeletal: Negative.    Skin: Negative.    Neurological:  Positive for sensory change.   Endo/Heme/Allergies: Negative.    Psychiatric/Behavioral: Negative.         PATIENT HISTORY:    Past Medical History:   Diagnosis Date    Amblyopia of left eye 04/10/2013    Arthritis     Facet arthropathy, Lumbosacral    Atherosclerosis of aorta     Cataract     Central retinal vein occlusion of left eye     CKD (chronic kidney disease) stage 3, GFR 30-59 ml/min     Diabetes mellitus, type 2     Diabetic polyneuropathy 2022    Exotropia of both eyes 2013    recession RSR 5.0mm w/ adj; recession LR os 5.0  w/ adj; resect MR os  4.0mm    Hearing loss     History of resection of small bowel     Hypertensive retinopathy of both eyes     Hypoglycemia     Macular degeneration     OA (osteoarthritis) of shoulder     Right    Osteoporosis     Posterior vitreous detachment of both eyes     Psychiatric problem     Rhinitis     TIA (transient ischemic attack)        Past Surgical History:   Procedure Laterality Date    APPENDECTOMY      CARDIAC CATHETERIZATION      CATARACT EXTRACTION W/  INTRAOCULAR LENS IMPLANT Bilateral      SECTION, CLASSIC      CLOSURE OF LEFT ATRIAL APPENDAGE USING DEVICE N/A 2020    Procedure: Left atrial appendage closure device;  Surgeon: Abundio Curtis MD;  Location[facility] CATH LAB;  Service: Cardiology;  Laterality: N/A;    HYSTERECTOMY      INNER EAR SURGERY      IRRIGATION AND DEBRIDEMENT OF UPPER EXTREMITY Right 2024    Procedure: IRRIGATION AND DEBRIDEMENT, UPPER EXTREMITY;  Surgeon: Con Moran Jr., MD;  Location: [facility];  Service: Orthopedics;  Laterality: Right;    JOINT REPLACEMENT      LEFT KNEE REPLACEMENT IN  -    OOPHORECTOMY      SINUS SURGERY      STRABISMUS SURGERY  13    RSR recession 5 mm, LLR recession 5 mm and LMR resection 4mm    STRABISMUS SURGERY  2014    recess LR OD 6mm    TONSILLECTOMY      watchman surgery N/A 2020       Family History   Problem Relation Name Age of Onset    Hypertension Mother      Hypertension Father      Liver disease Sister      Diabetes Sister      Heart disease Sister      Diabetes Brother      Breast cancer Maternal Aunt      Heart disease Sister      Cancer Neg Hx          in first degree relatives     Social History     Socioeconomic History    Marital status:    Occupational History    Occupation: retired   Tobacco Use    Smoking status: Former     Current packs/day: 0.00     Types: Cigarettes     Quit date: 10/29/1982     Years since quittin.8     Passive exposure: Never    Smokeless  tobacco: Never   Substance and Sexual Activity    Alcohol use: Not Currently     Alcohol/week: 2.0 standard drinks of alcohol     Types: 2 Shots of liquor per week     Comment: rare    Drug use: No    Sexual activity: Yes   Other Topics Concern    Are you pregnant or think you may be? No    Breast-feeding No     Social Determinants of Health     Financial Resource Strain: High Risk (4/15/2024)    Overall Financial Resource Strain (CARDIA)     Difficulty of Paying Living Expenses: Very hard   Food Insecurity: Food Insecurity Present (4/15/2024)    Hunger Vital Sign     Worried About Running Out of Food in the Last Year: Sometimes true     Ran Out of Food in the Last Year: Sometimes true   Transportation Needs: No Transportation Needs (4/15/2024)    PRAPARE - Transportation     Lack of Transportation (Medical): No     Lack of Transportation (Non-Medical): No   Physical Activity: Sufficiently Active (4/15/2024)    Exercise Vital Sign     Days of Exercise per Week: 7 days     Minutes of Exercise per Session: 90 min   Recent Concern: Physical Activity - Inactive (1/29/2024)    Exercise Vital Sign     Days of Exercise per Week: 0 days     Minutes of Exercise per Session: 0 min   Stress: No Stress Concern Present (4/15/2024)    [employer] - Occupational Stress Questionnaire     Feeling of Stress : Only a little   Recent Concern: Stress - Stress Concern Present (1/29/2024)    [employer] - Occupational Stress Questionnaire     Feeling of Stress : To some extent   Housing Stability: Low Risk  (4/15/2024)    Housing Stability Vital Sign     Unable to Pay for Housing in the Last Year: No     Homeless in the Last Year: No       Allergies:  Opioids - morphine analogues, Tizanidine, Tramadol, Morphine, Opioids-meperidine and related, and Ciprofloxacin    Medications:  Outpatient Encounter Medications as of 8/26/2024   Medication Sig Dispense Refill    aspirin (ECOTRIN) 81  MG EC tablet Take 81 mg by mouth once daily.      FLUAD QUAD 2023-24,65Y UP,,PF, 60 mcg (15 mcg x 4)/0.5 mL Syrg       furosemide (LASIX) 20 MG tablet Take 2 tablets in the a.m. for weights 155-159, take 2 tablets twice a day for weights 160 over, none for weights less than 155 120 tablet 11    pravastatin (PRAVACHOL) 40 MG tablet Take 1 tablet (40 mg total) by mouth once daily. 90 tablet 3    propylene glycol (SYSTANE COMPLETE OPHT) Apply to eye.      psyllium 0.52 gram capsule Take 3 capsules by mouth 3 (three) times daily as needed (constipation).      silver sulfADIAZINE 1% (SILVADENE) 1 % cream Apply to RLE skin breakdown twice daily      spironolactone (ALDACTONE) 25 MG tablet Take 1 tablet (25 mg total) by mouth 2 (two) times daily. 180 tablet 3    vitamin E 400 UNIT capsule Take 400 Units by mouth once daily.       No facility-administered encounter medications on file as of 8/26/2024.         PHYSICAL EXAMINATION:    The patient generally appears in good health, is appropriately interactive, and is in no apparent distress.    Skin: No lesions.    Mental: Cooperative with normal affect.    Neuro: Grossly intact.    HEENT: Normal. No evidence of lymphadenopathy.    Chest:  normal inspiratory effort.    Abdomen: Soft, non-tender. No masses or organomegaly. Bladder is not palpable. No evidence of flank discomfort. No evidence of inguinal hernia.    Extremities: No clubbing, cyanosis, or edema    NOTE:  the exam was carried out with a nurse chaperone present  Normal external female genitalia, vagina is narrowed  Grade II urogenital atrophy  Urethral meatus is normal  Urethra and bladder are nontender to bimanual exam  Well supported anteriorly and posteriorly   Uterus and cervix are surgically absent  No adnexal masses  PVR by catheterization was 260 ml    LABS:    Lab Results   Component Value Date    BUN 40 (H) 08/06/2024    CREATININE 1.5 (H) 08/06/2024       UA 1.005, pH 7, + leuk, nitrite negative, tr  protein, otherwise, negative.     IMPRESSION:    Incomplete bladder emptying  Recurrent UTI    PLAN:    1.  Recommended double voiding  2.  Renal ultrasound and cystoscopy]  3.  The catheterized specimen was sent for culture      I spent 45 minutes with the patient of which more than half was spent in direct consultation with the patient in regards to our treatment and plan.    Copy to:  Dr. Reed Ruiz.

## 2024-08-26 NOTE — TELEPHONE ENCOUNTER
----- Message from Olga Lidia Finnegan sent at 8/26/2024  2:02 PM CDT -----  Type:  Patient Returning Call     Who Called:CASTILLO ARREDONDO [[id number]]  Who Left Message for Patient:n/a  Does the patient know what this is regarding?:no  Would the patient rather a call back or a response via [facility]? call  Best Call Back Number:[phone]   Additional Information: The patient received a call and thinks its from the provider. Please give them a call.

## 2024-08-28 LAB — BACTERIA UR CULT: ABNORMAL

## 2024-09-04 ENCOUNTER — TELEPHONE (OUTPATIENT)
Dept: UROLOGY | Facility: CLINIC | Age: 89
End: 2024-09-04
Payer: MEDICARE

## 2024-09-04 DIAGNOSIS — N39.0 RECURRENT UTI: Primary | ICD-10-CM

## 2024-09-04 RX ORDER — AMPICILLIN 500 MG/1
500 CAPSULE ORAL 4 TIMES DAILY
Qty: 20 CAPSULE | Refills: 0 | Status: SHIPPED | OUTPATIENT
Start: 2024-09-04

## 2024-09-04 NOTE — TELEPHONE ENCOUNTER
Patient had Enterococcus in the urine.  Ampicillin x 5 dasy was sent to her [facility] on [address].

## 2024-09-05 ENCOUNTER — TELEPHONE (OUTPATIENT)
Dept: UROLOGY | Facility: CLINIC | Age: 89
End: 2024-09-05
Payer: MEDICARE

## 2024-09-09 ENCOUNTER — CLINICAL SUPPORT (OUTPATIENT)
Dept: AUDIOLOGY | Facility: CLINIC | Age: 89
End: 2024-09-09
Payer: MEDICARE

## 2024-09-09 DIAGNOSIS — H90.3 ASYMMETRICAL SENSORINEURAL HEARING LOSS: Primary | ICD-10-CM

## 2024-09-09 PROCEDURE — 99499 UNLISTED E&M SERVICE: CPT | Mod: HCNC,S$GLB,, | Performed by: AUDIOLOGIST

## 2024-09-09 NOTE — PROGRESS NOTES
Hearing Aid Follow-up:    Ms. Abad was seen today for a hearing aid follow-up appointment. She reported she is doing well with her hearing aids. She still struggles in noisy environments or when the speaker is not facing her, but understands realistic expectations for hearing aids with her hearing loss. She reported she sometimes has trouble putting the dome back on her right hearing aid after changing her filter. Ms. Abad previously wore a cshell on the right hearing aid, but the right cshell no longer fits due to weight changes. Ms. Abad previously decided not to replace the right c shell because she likes how the power dome fits deeply into her ear canal. Ms. Abad was counseled that she would likely hear better with a chsell and it would be easier to replace the filters. She will think about it and let me know at her next f/u appointment. The hearing aids were cleaned and checked. A listening check confirmed both hearing aids to be in good working condition. Mid-frequency gain was increased per Ms. Abad's request. She reported good sound quality. Ms. Abad will f/u in 6 months or sooner if needed.           Rachel Estrada [id number]   Purchase Date: 3/14/2023  Khadijah Gutierrez   SN: [id number]   Speaker: #1 UP   Service Warranty Exp: 06/11/26   One time courtesy replacement     cShell 4.0 Acrylic   SN: [id number] ACC: [id number]   Service Warranty Exp: 05/07/2024      RIGHT:     RT SN: [id number]   Warranty Exp:  06/11/2026   Small power dom

## 2024-09-16 ENCOUNTER — TELEPHONE (OUTPATIENT)
Dept: UROLOGY | Facility: CLINIC | Age: 89
End: 2024-09-16
Payer: MEDICARE

## 2024-09-16 NOTE — TELEPHONE ENCOUNTER
spoke w/ pt's daughter (Afshan) who said previous antibiotic instructions were too complicated, calling for pt to take 4 times per day and 2 hours before and after meals. Pt tried her best to take medication correctly but still believes she has UTI. Cannot say what symptoms she is experiencing as she did not know she had an UTI previously until called with results of urine culture and notification about medication being sent to pharmacy. Concerned that infection may still be lingering so I offered soonest available appointment which was next day (9/17 at 3:40pm) with NP at [facility].

## 2024-09-16 NOTE — TELEPHONE ENCOUNTER
----- Message from Yola Dent sent at 9/16/2024  8:03 AM CDT -----  Contact: josefina @ [phone]  Jenniffer Jimenez calling regarding Same Day Appointment  (message) for #pt is calling to see if possible can be seen today or get a script for bad UTI, asking for call back

## 2024-09-17 ENCOUNTER — OFFICE VISIT (OUTPATIENT)
Dept: UROLOGY | Facility: CLINIC | Age: 89
End: 2024-09-17
Payer: MEDICARE

## 2024-09-17 VITALS
BODY MASS INDEX: 28.28 KG/M2 | DIASTOLIC BLOOD PRESSURE: 70 MMHG | HEART RATE: 79 BPM | SYSTOLIC BLOOD PRESSURE: 154 MMHG | HEIGHT: 65 IN | WEIGHT: 169.75 LBS

## 2024-09-17 DIAGNOSIS — R10.2 SUPRAPUBIC PAIN: Primary | ICD-10-CM

## 2024-09-17 DIAGNOSIS — R30.0 DYSURIA: ICD-10-CM

## 2024-09-17 PROCEDURE — 87088 URINE BACTERIA CULTURE: CPT | Mod: HCNC

## 2024-09-17 PROCEDURE — 1160F RVW MEDS BY RX/DR IN RCRD: CPT | Mod: CPTII,S$GLB,,

## 2024-09-17 PROCEDURE — 1159F MED LIST DOCD IN RCRD: CPT | Mod: CPTII,S$GLB,,

## 2024-09-17 PROCEDURE — 87186 SC STD MICRODIL/AGAR DIL: CPT | Mod: HCNC

## 2024-09-17 PROCEDURE — 99999 PR PBB SHADOW E&M-EST. PATIENT-LVL III: CPT | Mod: PBBFAC,,,

## 2024-09-17 PROCEDURE — 99214 OFFICE O/P EST MOD 30 MIN: CPT | Mod: S$GLB,,,

## 2024-09-17 PROCEDURE — 87086 URINE CULTURE/COLONY COUNT: CPT | Mod: HCNC

## 2024-09-17 PROCEDURE — 1101F PT FALLS ASSESS-DOCD LE1/YR: CPT | Mod: CPTII,S$GLB,,

## 2024-09-17 PROCEDURE — 3288F FALL RISK ASSESSMENT DOCD: CPT | Mod: CPTII,S$GLB,,

## 2024-09-17 NOTE — PROGRESS NOTES
[facility]      Urinary Tract Infection Evaluation Note    9/17/2024    Referred by:  No ref. provider found    HPI: Jenniffer Jimenez is a very pleasant 89 y.o. female who is an established patient seen today for evaluation of acute cystitis. She reports that symptoms began 2 weeks ago. Symptoms suggestive that this episode is associated with urinary tract infection include: dysuria and suprapubic pain. She was prescribed amoxicillin for a positive culture last week but fears her infection persists.    Review of the history reveals positive urine cultures demonstrating growth of E. coli. Review of culture sensitivities shows that infections have had significant antibiotic resistance. Patient has been treated with appropriate, culture-specific antibiotics in the past.     She reports  occasional constipation . She reports no voiding symptoms. Her symptoms typically resolve with antibiotic therapy. She reports recent use of antibiotic. She is postmenopausal. She does not use topical vaginal estrogen.     Catheterized PVR was 50 mL.       Previous evaluation for her infections have included no upper or lower tract evaluation. She is currently scheduled to undergo upper and lower tract evaluation with Dr. Ruby. Previous treatments for her recurrent infections have included antibiotics for each infection.     A review of 10+ systems was conducted with pertinent positive and negative findings documented in HPI with all other systems reviewed and negative.    Past medical, family, surgical and social history reviewed as documented in chart with pertinent positive medical, family, surgical and social history detailed in HPI.    Exam Findings:    NOTE:  the exam was carried out with a nurse chaperone present  Normal external female genitalia  Urethral meatus is normal  Urethra and bladder are nontender to bimanual exam  Vaginal Mucosa: mild atrophy Const: no acute distress, conversant and alert  Eyes:  anicteric, extraocular muscles intact  ENMT: normocephalic, Nl oral membranes  Cardio: no cyanosis, nl cap refill  Pulm: no tachypnea; no resp distress  Abd: soft, no tenderness  Musc: no laceration, no tenderness  Neuro: alert; oriented x 3  Skin: warm, dry; no petichiae  Psych: no anxiety; normal speech       Assessment/Plan:    Acute Cystitis (est, addt'l workup):    The catheterized specimen was sent for culture   Patient appears to present today with symptomatic lower urinary tract infection; will await culture results given resistance pattern         Preventative measure recommended today:  D-mannose 1000 mg bid     I spent a total of 30 minutes on the day of the visit.This includes face to face time and non-face to face time preparing to see the patient (eg, review of tests), obtaining and/or reviewing separately obtained history, documenting clinical information in the electronic or other health record, independently interpreting results and communicating results to the patient/family/caregiver, or care coordinator.

## 2024-09-20 DIAGNOSIS — N30.00 ACUTE CYSTITIS WITHOUT HEMATURIA: Primary | ICD-10-CM

## 2024-09-20 LAB — BACTERIA UR CULT: ABNORMAL

## 2024-09-20 RX ORDER — CEFDINIR 300 MG/1
300 CAPSULE ORAL 2 TIMES DAILY
Qty: 14 CAPSULE | Refills: 0 | Status: SHIPPED | OUTPATIENT
Start: 2024-09-20 | End: 2024-09-27

## 2024-09-23 ENCOUNTER — PROCEDURE VISIT (OUTPATIENT)
Facility: CLINIC | Age: 89
End: 2024-09-23
Payer: MEDICARE

## 2024-09-23 VITALS
DIASTOLIC BLOOD PRESSURE: 63 MMHG | HEART RATE: 80 BPM | TEMPERATURE: 98 F | BODY MASS INDEX: 28.07 KG/M2 | WEIGHT: 168.63 LBS | RESPIRATION RATE: 20 BRPM | SYSTOLIC BLOOD PRESSURE: 119 MMHG

## 2024-09-23 DIAGNOSIS — N39.0 RECURRENT UTI: ICD-10-CM

## 2024-09-23 RX ORDER — LIDOCAINE HYDROCHLORIDE 20 MG/ML
JELLY TOPICAL ONCE
OUTPATIENT
Start: 2024-09-23 | End: 2024-09-23

## 2024-09-23 RX ORDER — CEFDINIR 300 MG/1
300 CAPSULE ORAL 2 TIMES DAILY
Qty: 14 CAPSULE | Refills: 0 | Status: SHIPPED | OUTPATIENT
Start: 2024-09-23 | End: 2024-10-01

## 2024-09-23 RX ORDER — METHENAMINE HIPPURATE 1000 MG/1
1 TABLET ORAL 2 TIMES DAILY
Qty: 60 TABLET | Refills: 5 | Status: SHIPPED | OUTPATIENT
Start: 2024-09-23

## 2024-09-23 NOTE — PROCEDURES
Procedures    Patient never received the message that she had a positive culture, nor did the pharmacy alert her that she had a prescription ready.  She never took the Cefdinir.  Patient requests that the prescription be sent to [facility] as she does not have transportation to get it from [facility].  This was done.  Will reschedule her.

## 2024-09-23 NOTE — PATIENT INSTRUCTIONS
Methenamine--Instructions for Use    Methenamine is an antiseptic for the bladder.  It works better when taken with Vitamin C.  So the recommendation is to take 500 mg Vitamin C with the methenamine.  If you start an antibiotic for a bladder infection, hold the methenamine/vit C and once it has been completed, restart the methenamine/Vitamin C.

## 2024-09-27 ENCOUNTER — TELEPHONE (OUTPATIENT)
Facility: CLINIC | Age: 89
End: 2024-09-27
Payer: MEDICARE

## 2024-09-27 NOTE — TELEPHONE ENCOUNTER
Spoke with pt re: procedure appt for MOnday.   Discussed location & arrival time for 1:00pm.  Pt verbalized understanding

## 2024-09-30 ENCOUNTER — PROCEDURE VISIT (OUTPATIENT)
Facility: CLINIC | Age: 89
End: 2024-09-30
Payer: MEDICARE

## 2024-09-30 VITALS
DIASTOLIC BLOOD PRESSURE: 74 MMHG | BODY MASS INDEX: 28.36 KG/M2 | WEIGHT: 170.44 LBS | RESPIRATION RATE: 18 BRPM | HEART RATE: 76 BPM | TEMPERATURE: 98 F | SYSTOLIC BLOOD PRESSURE: 145 MMHG

## 2024-09-30 DIAGNOSIS — N39.0 RECURRENT UTI: ICD-10-CM

## 2024-09-30 PROCEDURE — 52000 CYSTOURETHROSCOPY: CPT | Mod: S$GLB,,, | Performed by: UROLOGY

## 2024-09-30 RX ORDER — DOXYCYCLINE HYCLATE 100 MG
100 TABLET ORAL ONCE
Status: SHIPPED | OUTPATIENT
Start: 2024-09-30

## 2024-09-30 RX ORDER — LIDOCAINE HYDROCHLORIDE 20 MG/ML
JELLY TOPICAL ONCE
Status: COMPLETED | OUTPATIENT
Start: 2024-09-30 | End: 2024-09-30

## 2024-09-30 RX ADMIN — LIDOCAINE HYDROCHLORIDE: 20 JELLY TOPICAL at 01:09

## 2024-09-30 NOTE — PROCEDURES
Procedures    CYSTOSCOPY REPORT    Pre Procedure Diagnosis:  recurrent UTI    Post Procedure Diagnosis:  normal lower urinary tract    Anesthesia: 10 cc 2% lidocaine jelly applied per urethra.    14 FR Flexible Olympus cystoscope used.    FINDINGS:  Dome, anterior, posterior, lateral walls and bladder base free of urothelial abnormalities. Right and left ureteral orifices in the normal postion and configuration, both effluxed clear urine.  Bladder neck and urethra were normal.    Specimen:  none    The patient was taken to the cystoscopy suite and placed in dorsal lithotomy position.  The genitalia was prepped and draped  in the usual sterile fashion.  Time out was performed.  Two percent lidocaine jelly was inserted in the urethra.  After sufficent time had passed to allow good local anesthesia, the cystoscope was inserted in the urethra and passed into the bladder visualizing the urethra along its entire course.  The dome, anterior, posterior and lateral walls were examined systematically.  The ureteral orifices were in their usual position and configuration.  The cystoscope was turned upon itself 180 degrees to visualize the bladder neck.  The cystoscope was then brought to the level of the bladder neck, the water was turned on and the urethra was visualized.  The cystoscope was removed and the patient was instructed to urinate prior to leaving the office.     Post procedure medication:  has one more dose of cipro to take tonight.  Start methenamine and Vitamin C tomorrow.  She wanted to know if she could take her iron tablets, Vitamin E, Vitamin D3 2000 IU, and CBD at night for sleep.  Yes, fine to take these.      ADDITIONAL NOTES:  renal ultrasound 8/26/2024 was normal.      ASSESSMENT/PLAN:  89 year old woman status post flexible cystoscopy.  1. Push fluids for 24 hours.  2. May see blood in the urine, this should gradually improve over the next 2-3 days.  3. The patient was instructed to return to the office  or go to the emergency should fever, chills, cloudy urine, or inability to urinate develop.  4. Follow up in 6 months.  Check the Cr n 6 months to make sure she can continue the methenamine

## 2024-09-30 NOTE — PATIENT INSTRUCTIONS
What to Expect After a Cystoscopy  For the next 24-48 hours, you may feel a mild burning when you urinate. This burning is normal and expected. Drink plenty of water to dilute the urine to help relieve the burning sensation. You may also see a small amount of blood in your urine after the procedure.    Unless you are already taking antibiotics, you may be given an antibiotic after the test to prevent infection.    Signs and Symptoms to Report  Call the [facility] at [phone] if you develop any of the following:  Fever of 101 degrees or higher  Chills or persistent bleeding  Inability to urinate

## 2024-10-01 ENCOUNTER — TELEPHONE (OUTPATIENT)
Dept: UROLOGY | Facility: CLINIC | Age: 89
End: 2024-10-01
Payer: MEDICARE

## 2024-10-01 NOTE — TELEPHONE ENCOUNTER
----- Message from Shantell Ruby MD sent at 9/30/2024  5:59 PM CDT -----  Please put her in for a 6 month recall.  Ok to see midlevel.

## 2024-10-11 ENCOUNTER — TELEPHONE (OUTPATIENT)
Dept: INTERNAL MEDICINE | Facility: CLINIC | Age: 89
End: 2024-10-11
Payer: MEDICARE

## 2024-10-11 DIAGNOSIS — Z12.31 VISIT FOR SCREENING MAMMOGRAM: Primary | ICD-10-CM

## 2024-10-11 NOTE — TELEPHONE ENCOUNTER
Pt requesting orders for mammogram. Do I need to put in order or does she exceed mammogram age range?

## 2024-10-11 NOTE — TELEPHONE ENCOUNTER
----- Message from Bella sent at 10/11/2024  3:16 PM CDT -----  Contact: Pt [phone]  Caller is requesting to schedule their annual screening mammogram appointment. Order is not listed in Epic.  Please enter order and contact patient to schedule.    Would the patient like a call back, or a response through their [facility] portal?:   Callback     Where would they like the mammogram performed?: [facility]     Comments:

## 2024-10-17 ENCOUNTER — OFFICE VISIT (OUTPATIENT)
Dept: URGENT CARE | Facility: CLINIC | Age: 89
End: 2024-10-17
Payer: MEDICARE

## 2024-10-17 ENCOUNTER — TELEPHONE (OUTPATIENT)
Dept: UROLOGY | Facility: CLINIC | Age: 89
End: 2024-10-17
Payer: MEDICARE

## 2024-10-17 VITALS
OXYGEN SATURATION: 98 % | SYSTOLIC BLOOD PRESSURE: 133 MMHG | TEMPERATURE: 98 F | BODY MASS INDEX: 28.32 KG/M2 | HEIGHT: 65 IN | RESPIRATION RATE: 15 BRPM | WEIGHT: 170 LBS | DIASTOLIC BLOOD PRESSURE: 78 MMHG | HEART RATE: 66 BPM

## 2024-10-17 DIAGNOSIS — R23.3 PETECHIAL RASH: ICD-10-CM

## 2024-10-17 DIAGNOSIS — N30.01 ACUTE CYSTITIS WITH HEMATURIA: Primary | ICD-10-CM

## 2024-10-17 DIAGNOSIS — S51.811A SKIN TEAR OF FOREARM WITHOUT COMPLICATION, RIGHT, INITIAL ENCOUNTER: ICD-10-CM

## 2024-10-17 LAB
BILIRUBIN, UA POC OHS: NEGATIVE
BLOOD, UA POC OHS: ABNORMAL
CLARITY, UA POC OHS: ABNORMAL
COLOR, UA POC OHS: YELLOW
GLUCOSE, UA POC OHS: NEGATIVE
KETONES, UA POC OHS: ABNORMAL
LEUKOCYTES, UA POC OHS: ABNORMAL
NITRITE, UA POC OHS: NEGATIVE
PH, UA POC OHS: 6
PROTEIN, UA POC OHS: ABNORMAL
SPECIFIC GRAVITY, UA POC OHS: 1.01
UROBILINOGEN, UA POC OHS: 0.2

## 2024-10-17 PROCEDURE — 87186 SC STD MICRODIL/AGAR DIL: CPT | Mod: HCNC | Performed by: NURSE PRACTITIONER

## 2024-10-17 PROCEDURE — 87086 URINE CULTURE/COLONY COUNT: CPT | Mod: HCNC | Performed by: NURSE PRACTITIONER

## 2024-10-17 PROCEDURE — 87088 URINE BACTERIA CULTURE: CPT | Mod: HCNC | Performed by: NURSE PRACTITIONER

## 2024-10-17 PROCEDURE — 99214 OFFICE O/P EST MOD 30 MIN: CPT | Mod: S$GLB,,, | Performed by: NURSE PRACTITIONER

## 2024-10-17 PROCEDURE — 81003 URINALYSIS AUTO W/O SCOPE: CPT | Mod: QW,S$GLB,, | Performed by: NURSE PRACTITIONER

## 2024-10-17 RX ORDER — HYDROCORTISONE 25 MG/G
CREAM TOPICAL 2 TIMES DAILY PRN
Qty: 30 G | Refills: 1 | Status: SHIPPED | OUTPATIENT
Start: 2024-10-17

## 2024-10-17 RX ORDER — MUPIROCIN 20 MG/G
OINTMENT TOPICAL
Qty: 30 G | Refills: 1 | Status: SHIPPED | OUTPATIENT
Start: 2024-10-17

## 2024-10-17 RX ORDER — CEPHALEXIN 500 MG/1
500 CAPSULE ORAL EVERY 12 HOURS
Qty: 14 CAPSULE | Refills: 0 | Status: SHIPPED | OUTPATIENT
Start: 2024-10-17 | End: 2024-10-24

## 2024-10-17 NOTE — TELEPHONE ENCOUNTER
Attempted to call pt to inform her of Brionna's next availability on Tuesday, Oct. 22nd. No VM box set up to leave a message.    ----- Message from Erin sent at 10/17/2024  8:42 AM CDT -----  Contact: [phone]  CASTILLO ARREDONDO calling regarding Refills  (message) Pt asking for a call back she states she has a UTI and needs something sent in pt also states that she is having a side affect from the medications the provider put her on that she would like to discuss Pt states she would like to come in today

## 2024-10-17 NOTE — PROGRESS NOTES
"Subjective:      Patient ID: Jenniffer Jimenez is a 89 y.o. female.    Vitals:  height is 5' 5" (1.651 m) and weight is 77.1 kg (170 lb). Her oral temperature is 97.5 °F (36.4 °C). Her blood pressure is 133/78 and her pulse is 66. Her respiration is 15 and oxygen saturation is 98%.     Chief Complaint: Urinary Tract Infection and Trauma    89 y/o female with h/o UTI, anemia, pancytopenia, ans senile purpura (in addition to other significant medical history (see problem list), presents to  today with c/o dysuria x3 days; and rash to both legs.  Pt takes aspirin therapy (but no other blood thinners, she states).    Pt was placed on methenamine for recurring UTIs, (last month), and believes the rash she has is due to this medication.  Rash is minimally itchy.      She also reports cut to her right arm that occurred this morning after scraping arm against her door, at home.  She states the skin peeled back.          Urinary Tract Infection   This is a new problem. The current episode started in the past 7 days. The problem occurs every urination. The problem has been unchanged. The quality of the pain is described as aching. The pain is moderate. There has been no fever. Associated symptoms include frequency, urgency and rash. Pertinent negatives include no flank pain or vomiting. She has tried nothing for the symptoms.   Trauma  The incident occurred 1 to 3 hours ago. The incident occurred at home. The injury mechanism was a direct blow. The injury occurred in the context of other. The pain is moderate. Pertinent negatives include no abdominal pain, coughing, numbness, tingling or vomiting. Her tetanus status is unknown.       Constitution: Negative for fever.   Respiratory:  Negative for cough, shortness of breath and wheezing.    Gastrointestinal:  Negative for abdominal pain, vomiting and diarrhea.   Genitourinary:  Positive for dysuria, frequency and urgency. Negative for flank pain.   Skin:  Positive for " rash, wound, erythema and bruising. Negative for hives.   Allergic/Immunologic: Positive for itching. Negative for hives.   Neurological:  Negative for numbness.      Objective:     Physical Exam   Constitutional: She is oriented to person, place, and time.  Non-toxic appearance. She does not appear ill. No distress.      Comments:Pt appears stable with good activity level. NAD.      HENT:   Head: Normocephalic.   Nose: Nose normal.   Mouth/Throat: Mucous membranes are moist.   Cardiovascular: Normal rate.   Pulmonary/Chest: Effort normal.   Abdominal: Normal appearance. She exhibits no distension. Soft. flat abdomen There is no abdominal tenderness. There is no left CVA tenderness and no right CVA tenderness.   Musculoskeletal: Normal range of motion.         General: Normal range of motion.   Neurological: She is alert and oriented to person, place, and time.   Skin: Skin is warm, dry, not diaphoretic and rash. bruising and erythema         Comments: Skin tear noted to right forearm (type 1). No active bleeding. Cleaned wound in UC. Flap set back in place as best as possible; abx ointment placed; and site covered with bandage. Will not require suturing. Bruising noted around site.     Erythematous rash (petechial and senile purpura) noted to bilateral lower legs and feet. Non-blanchable. No surrounding cellulitis. Bruising noted to left lower leg, beneath the knee.   No open wounds or drainage.   No edema. Pedal pulse 2+ and cap refill 2 seconds to toes.   Pt wearing compression socks.    Psychiatric: Her behavior is normal. Mood normal.   Nursing note and vitals reviewed.      Assessment:     1. Acute cystitis with hematuria    2. Skin tear of forearm without complication, right, initial encounter    3. Petechial rash        Plan:   Petechial rash/senile purpura likely chronic from medical history/medication. I do not suspect sepsis.   Pt requested referral to follow up with wound care: Dr. Cox.   Referral  placed on her behalf.   Pt instructed to notify and follow up with urology concerning the methenamine.     Acute cystitis with hematuria  -     POCT Urinalysis(Instrument)  -     CULTURE, URINE  -     cephALEXin (KEFLEX) 500 MG capsule; Take 1 capsule (500 mg total) by mouth every 12 (twelve) hours. for 7 days  Dispense: 14 capsule; Refill: 0    Skin tear of forearm without complication, right, initial encounter  -     mupirocin (BACTROBAN) 2 % ointment; Apply topically to wound three times a day for one week  Dispense: 30 g; Refill: 1  -     Ambulatory referral/consult to Wound Clinic    Petechial rash  Comments:  bilateral lower legs  Orders:  -     Ambulatory referral/consult to Wound Clinic    Other orders  -     hydrocortisone 2.5 % cream; Apply topically 2 (two) times daily as needed (itchy rash).  Dispense: 30 g; Refill: 1      Patient Instructions   A referral has been placed for you with Wound Care. [facility] should call you to set up the appt, but if you do not hear from [facility], please call [phone] to make the appointment.  You may request Dr. Cxo when making appointment.     Wound:   Keep wound clean and dry  Apply prescribed antibiotic ointment  Keep covered for first couple or few days (or longer if you have any oozing)  Monitor site and follow up with any: increased redness, swelling, pus-like drainage, red-streaking, or if you develop fever    UTI:  Increase water intake  Wipe front to back  Take time on toilet while voiding  Use gentle soap  Avoid scented soaps/gels/bubble baths

## 2024-10-17 NOTE — PATIENT INSTRUCTIONS
A referral has been placed for you with Wound Care. [facility] should call you to set up the appt, but if you do not hear from [facility], please call [phone] to make the appointment.  You may request Dr. Cox when making appointment.     Wound:   Keep wound clean and dry  Apply prescribed antibiotic ointment  Keep covered for first couple or few days (or longer if you have any oozing)  Monitor site and follow up with any: increased redness, swelling, pus-like drainage, red-streaking, or if you develop fever    UTI:  Increase water intake  Wipe front to back  Take time on toilet while voiding  Use gentle soap  Avoid scented soaps/gels/bubble baths

## 2024-10-17 NOTE — TELEPHONE ENCOUNTER
Advised pt to go to urgent care for them to look at her rash and treat her UTI sx per Dr. Ruby. Pt voiced understanding, and I provided the address to her nearest [facility] urgent care.

## 2024-10-19 LAB — BACTERIA UR CULT: ABNORMAL

## 2024-10-24 ENCOUNTER — HOSPITAL ENCOUNTER (OUTPATIENT)
Dept: RADIOLOGY | Facility: HOSPITAL | Age: 89
Discharge: HOME | End: 2024-10-24
Attending: INTERNAL MEDICINE
Payer: MEDICARE

## 2024-10-24 DIAGNOSIS — Z12.31 VISIT FOR SCREENING MAMMOGRAM: ICD-10-CM

## 2024-10-24 PROCEDURE — 77063 BREAST TOMOSYNTHESIS BI: CPT | Mod: TC,HCNC,PO

## 2024-10-24 PROCEDURE — 77067 SCR MAMMO BI INCL CAD: CPT | Mod: TC,HCNC,PO

## 2024-11-21 ENCOUNTER — OFFICE VISIT (OUTPATIENT)
Dept: PODIATRY | Facility: CLINIC | Age: 89
End: 2024-11-21
Payer: MEDICARE

## 2024-11-21 VITALS
SYSTOLIC BLOOD PRESSURE: 144 MMHG | WEIGHT: 170.19 LBS | BODY MASS INDEX: 28.36 KG/M2 | HEIGHT: 65 IN | DIASTOLIC BLOOD PRESSURE: 79 MMHG | HEART RATE: 81 BPM | RESPIRATION RATE: 19 BRPM

## 2024-11-21 DIAGNOSIS — B35.1 ONYCHOMYCOSIS DUE TO DERMATOPHYTE: ICD-10-CM

## 2024-11-21 DIAGNOSIS — G60.9 IDIOPATHIC PERIPHERAL NEUROPATHY: Primary | ICD-10-CM

## 2024-11-21 DIAGNOSIS — L60.9 NAIL ABNORMALITIES: ICD-10-CM

## 2024-11-21 PROCEDURE — 99999 PR PBB SHADOW E&M-EST. PATIENT-LVL III: CPT | Mod: PBBFAC,HCNC,, | Performed by: PODIATRIST

## 2024-11-21 NOTE — PROGRESS NOTES
Subjective:     Patient ID: Jenniffer Jimenez is a 89 y.o. female.    Chief Complaint: No chief complaint on file.    Jenniffer is a 89 y.o. female who presents to the clinic for evaluation and treatment of high risk feet. Jenniffer has a past medical history of Amblyopia of left eye (04/10/2013), Arthritis, Atherosclerosis of aorta, Cataract, Central retinal vein occlusion of left eye, CKD (chronic kidney disease) stage 3, GFR 30-59 ml/min, Diabetes mellitus, type 2, Diabetic polyneuropathy (01/06/2022), Exotropia of both eyes (02/06/2013), Hearing loss, History of resection of small bowel, Hypertensive retinopathy of both eyes, Hypoglycemia, Macular degeneration, OA (osteoarthritis) of shoulder, Osteoporosis, Posterior vitreous detachment of both eyes, Psychiatric problem, Rhinitis, and TIA (transient ischemic attack). The patient's chief complaint is long, thick toenails. This patient has documented high risk feet requiring routine maintenance secondary to diabetes mellitis and those secondary complications of diabetes, as mentioned..    PCP: Reed Ruiz MD    Date Last Seen by PCP:   No chief complaint on file.    Hemoglobin A1C   Date Value Ref Range Status   04/10/2024 5.9 (H) 4.0 - 5.6 % Final     Comment:     ADA Screening Guidelines:  5.7-6.4%  Consistent with prediabetes  >or=6.5%  Consistent with diabetes    High levels of fetal hemoglobin interfere with the HbA1C  assay. Heterozygous hemoglobin variants (HbS, HgC, etc)do  not significantly interfere with this assay.   However, presence of multiple variants may affect accuracy.     04/28/2023 5.9 (H) 4.0 - 5.6 % Final     Comment:     ADA Screening Guidelines:  5.7-6.4%  Consistent with prediabetes  >or=6.5%  Consistent with diabetes    High levels of fetal hemoglobin interfere with the HbA1C  assay. Heterozygous hemoglobin variants (HbS, HgC, etc)do  not significantly interfere with this assay.   However, presence of multiple variants may affect  "accuracy.     01/26/2023 6.0 (H) 4.0 - 5.6 % Final     Comment:     ADA Screening Guidelines:  5.7-6.4%  Consistent with prediabetes  >or=6.5%  Consistent with diabetes    High levels of fetal hemoglobin interfere with the HbA1C  assay. Heterozygous hemoglobin variants (HbS, HgC, etc)do  not significantly interfere with this assay.   However, presence of multiple variants may affect accuracy.         Review of Systems   Constitutional: Negative for chills, decreased appetite and fever.   Cardiovascular:  Negative for leg swelling.   Skin:  Positive for dry skin and nail changes. Negative for flushing, itching and poor wound healing.   Musculoskeletal:  Positive for arthritis. Negative for joint pain, joint swelling and myalgias.   Gastrointestinal:  Negative for nausea and vomiting.   Neurological:  Negative for loss of balance, numbness and paresthesias.        Objective:     Vitals:    11/21/24 1317   BP: (!) 144/79   Pulse: 81   Resp: 19   Weight: 77.2 kg (170 lb 3.1 oz)   Height: 5' 5" (1.651 m)   PainSc: 0-No pain        Physical Exam  Vitals reviewed.   Constitutional:       General: She is not in acute distress.     Appearance: She is well-developed. She is not toxic-appearing.   Cardiovascular:      Pulses:           Dorsalis pedis pulses are 1+ on the right side and 1+ on the left side.        Posterior tibial pulses are 1+ on the right side and 1+ on the left side.      Comments: Toes are cool to touch. Feet are warm proximally.There is decreased digital hair. Skin is atrophic, slightly hyperpigmented, and mildly edematous      Musculoskeletal:         General: No tenderness. Normal range of motion.      Comments: Adequate joint range of motion without pain, limitation, nor crepitation Bilateral feet and ankle joints. Muscle strength is 5/5 in all groups bilaterally.         Skin:     General: Skin is warm and dry.      Coloration: Skin is not ashen or jaundiced.      Findings: No bruising, ecchymosis, " erythema, lesion or rash.      Comments: Nails x10 are elongated by  2-6mm's, thickened by 2-5 mm's, dystrophic, and are darkened in  coloration . Xerosis Bilaterally. No open lesions noted.       Neurological:      Mental Status: She is alert and oriented to person, place, and time.      Comments: Mertzon-Anne 5.07 monofilamant testing is diminished Senthil feet. Sharp/dull sensation diminished Bilaterally. Light touch absent Bilaterally.       Psychiatric:         Behavior: Behavior normal.         Assessment:      Encounter Diagnoses   Name Primary?    Idiopathic peripheral neuropathy Yes    Nail abnormalities     Onychomycosis due to dermatophyte      Plan:     Diagnoses and all orders for this visit:    Idiopathic peripheral neuropathy    Nail abnormalities    Onychomycosis due to dermatophyte      I counseled the patient on her conditions, their implications and medical management.        - Shoe inspection.  Foot Education. Patient instructed on proper foot hygeine. We discussed wearing proper shoe gear, daily foot inspections, never walking without protective shoe gear, never putting sharp instruments to feet, routine podiatric nail visits every 2-3 months.      - With patient's permission, nails were aggressively reduced and debrided x 10 to their soft tissue attachment mechanically  removing all offending nail and debris. Patient relates relief following the procedure. She will continue to monitor the areas daily, inspect her feet, wear protective shoe gear when ambulatory, moisturizer to maintain skin integrity and follow in this office in approximately 2-3 months, sooner p.r.n.

## 2024-12-07 ENCOUNTER — HOSPITAL ENCOUNTER (EMERGENCY)
Facility: HOSPITAL | Age: 89
Discharge: HOME | End: 2024-12-07
Attending: STUDENT IN AN ORGANIZED HEALTH CARE EDUCATION/TRAINING PROGRAM
Payer: MEDICARE

## 2024-12-07 VITALS
BODY MASS INDEX: 26.99 KG/M2 | RESPIRATION RATE: 18 BRPM | WEIGHT: 162 LBS | SYSTOLIC BLOOD PRESSURE: 168 MMHG | HEART RATE: 69 BPM | DIASTOLIC BLOOD PRESSURE: 63 MMHG | HEIGHT: 65 IN | OXYGEN SATURATION: 99 % | TEMPERATURE: 98 F

## 2024-12-07 DIAGNOSIS — N39.0 URINARY TRACT INFECTION WITHOUT HEMATURIA, SITE UNSPECIFIED: ICD-10-CM

## 2024-12-07 DIAGNOSIS — S81.812A NONINFECTED SKIN TEAR OF LEG, LEFT, INITIAL ENCOUNTER: ICD-10-CM
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
## 2024-12-07 DIAGNOSIS — R25.1 OCCASIONAL TREMORS: ICD-10-CM

## 2024-12-07 DIAGNOSIS — S01.81XA FACIAL LACERATION, INITIAL ENCOUNTER: Primary | ICD-10-CM

## 2024-12-07 LAB
BACTERIA #/AREA URNS HPF: ABNORMAL /HPF
BILIRUB UR QL STRIP: NEGATIVE
CLARITY UR: ABNORMAL
COLOR UR: YELLOW
GLUCOSE UR QL STRIP: NEGATIVE
HGB UR QL STRIP: ABNORMAL
KETONES UR QL STRIP: NEGATIVE
LEUKOCYTE ESTERASE UR QL STRIP: ABNORMAL
MICROSCOPIC COMMENT: ABNORMAL
NITRITE UR QL STRIP: NEGATIVE
PH UR STRIP: 7 [PH] (ref 5–8)
PROT UR QL STRIP: NEGATIVE
RBC #/AREA URNS HPF: 5 /HPF (ref 0–4)
SP GR UR STRIP: 1.01 (ref 1–1.03)
SQUAMOUS #/AREA URNS HPF: 1 /HPF
URN SPEC COLLECT METH UR: ABNORMAL
UROBILINOGEN UR STRIP-ACNC: NEGATIVE EU/DL
WBC #/AREA URNS HPF: >100 /HPF (ref 0–5)
WBC CLUMPS URNS QL MICRO: ABNORMAL

## 2024-12-07 PROCEDURE — 12011 RPR F/E/E/N/L/M 2.5 CM/<: CPT | Mod: HCNC

## 2024-12-07 PROCEDURE — 87088 URINE BACTERIA CULTURE: CPT | Mod: HCNC | Performed by: STUDENT IN AN ORGANIZED HEALTH CARE EDUCATION/TRAINING PROGRAM

## 2024-12-07 PROCEDURE — 87186 SC STD MICRODIL/AGAR DIL: CPT | Mod: HCNC | Performed by: STUDENT IN AN ORGANIZED HEALTH CARE EDUCATION/TRAINING PROGRAM

## 2024-12-07 PROCEDURE — 87086 URINE CULTURE/COLONY COUNT: CPT | Mod: HCNC | Performed by: STUDENT IN AN ORGANIZED HEALTH CARE EDUCATION/TRAINING PROGRAM

## 2024-12-07 PROCEDURE — 25000003 PHARM REV CODE 250: Mod: HCNC | Performed by: STUDENT IN AN ORGANIZED HEALTH CARE EDUCATION/TRAINING PROGRAM

## 2024-12-07 PROCEDURE — 99285 EMERGENCY DEPT VISIT HI MDM: CPT | Mod: 25,HCNC

## 2024-12-07 PROCEDURE — 81000 URINALYSIS NONAUTO W/SCOPE: CPT | Mod: HCNC | Performed by: STUDENT IN AN ORGANIZED HEALTH CARE EDUCATION/TRAINING PROGRAM

## 2024-12-07 RX ORDER — CEFPODOXIME PROXETIL 100 MG/1
100 TABLET, FILM COATED ORAL 2 TIMES DAILY
Qty: 14 TABLET | Refills: 0 | Status: SHIPPED | OUTPATIENT
Start: 2024-12-07 | End: 2024-12-14

## 2024-12-07 RX ORDER — ACETAMINOPHEN 500 MG
1000 TABLET ORAL
Status: COMPLETED | OUTPATIENT
Start: 2024-12-07 | End: 2024-12-07

## 2024-12-07 RX ADMIN — ACETAMINOPHEN 1000 MG: 500 TABLET ORAL at 01:12

## 2024-12-07 NOTE — ED PROVIDER NOTES
ED Provider Note - 2024    History     Chief Complaint   Patient presents with    Fall     The pt presents to the ED from the [facility] where she currently lives with a complaint of a witnessed fall.  The pt denies any LOC, no head/neck/back pain.  Pt takes daily aspirin.  Pt has skin tear to her RLE and L temple.  Pt was originally waiting for her daughter to come to the ED for a possible UTI and tremors x6 months and wanted to try and get evaluated by neurology for Parkinsons.         HPI     Jenniffer Jimenez is a 89 y.o. year old female with past medical and surgical history as seen below, presenting with chief complaint of fall.  Mechanical.  1.5 cm laceration to area above left eyebrow.  Aspirin daily.  Skin tear to right lower extremity.        Past Medical History:   Diagnosis Date    Amblyopia of left eye 04/10/2013    Arthritis     Facet arthropathy, Lumbosacral    Atherosclerosis of aorta     Cataract     Central retinal vein occlusion of left eye     CKD (chronic kidney disease) stage 3, GFR 30-59 ml/min     Diabetes mellitus, type 2     Diabetic polyneuropathy 2022    Exotropia of both eyes 2013    recession RSR 5.0mm w/ adj; recession LR os 5.0 w/ adj; resect MR os  4.0mm    Hearing loss     History of resection of small bowel     Hypertensive retinopathy of both eyes     Hypoglycemia     Macular degeneration     OA (osteoarthritis) of shoulder     Right    Osteoporosis     Posterior vitreous detachment of both eyes     Psychiatric problem     Rhinitis     TIA (transient ischemic attack)      Past Surgical History:   Procedure Laterality Date    APPENDECTOMY      CARDIAC CATHETERIZATION      CATARACT EXTRACTION W/  INTRAOCULAR LENS IMPLANT Bilateral      SECTION, CLASSIC      CLOSURE OF LEFT ATRIAL APPENDAGE USING DEVICE N/A 2020    Procedure: Left atrial appendage closure device;  Surgeon: Abundio Curtis MD;  Location: [facility] CATH LAB;  Service: Cardiology;   Laterality: N/A;    HYSTERECTOMY      INNER EAR SURGERY      IRRIGATION AND DEBRIDEMENT OF UPPER EXTREMITY Right 2024    Procedure: IRRIGATION AND DEBRIDEMENT, UPPER EXTREMITY;  Surgeon: Con Moran Jr., MD;  Location: [facility];  Service: Orthopedics;  Laterality: Right;    JOINT REPLACEMENT      LEFT KNEE REPLACEMENT IN  -    OOPHORECTOMY      SINUS SURGERY      STRABISMUS SURGERY  13    RSR recession 5 mm, LLR recession 5 mm and LMR resection 4mm    STRABISMUS SURGERY  2014    recess LR OD 6mm    TONSILLECTOMY      watchman surgery N/A 2020         Family History   Problem Relation Name Age of Onset    Hypertension Mother      Hypertension Father      Liver disease Sister      Diabetes Sister      Heart disease Sister      Diabetes Brother      Breast cancer Maternal Aunt      No Known Problems Maternal Uncle      No Known Problems Paternal Aunt      No Known Problems Paternal Uncle      No Known Problems Maternal Grandmother      No Known Problems Maternal Grandfather      No Known Problems Paternal Grandmother      No Known Problems Paternal Grandfather      No Known Problems Daughter      No Known Problems Son      Heart disease Sister      No Known Problems Son      No Known Problems Son      Cancer Neg Hx          in first degree relatives    Melanoma Neg Hx      Psoriasis Neg Hx      Lupus Neg Hx      Amblyopia Neg Hx      Blindness Neg Hx      Cataracts Neg Hx      Glaucoma Neg Hx      Macular degeneration Neg Hx      Retinal detachment Neg Hx      Strabismus Neg Hx      Stroke Neg Hx      Thyroid disease Neg Hx       Social History     Tobacco Use    Smoking status: Former     Current packs/day: 0.00     Types: Cigarettes     Quit date: 10/29/1982     Years since quittin.1     Passive exposure: Never    Smokeless tobacco: Never   Substance Use Topics    Alcohol use: Not Currently     Alcohol/week: 2.0 standard drinks of alcohol     Types: 2 Shots of liquor per week      "Comment: rare    Drug use: No     Social Drivers of Health with Concerns     Tobacco Use: Medium Risk (12/7/2024)    Patient History     Smoking Tobacco Use: Former     Smokeless Tobacco Use: Never     Passive Exposure: Never   Financial Resource Strain: High Risk (4/15/2024)    Overall Financial Resource Strain (CARDIA)     Difficulty of Paying Living Expenses: Very hard   Food Insecurity: Food Insecurity Present (4/15/2024)    Hunger Vital Sign     Worried About Running Out of Food in the Last Year: Sometimes true     Ran Out of Food in the Last Year: Sometimes true   Physical Activity: Sufficiently Active (4/15/2024)    Exercise Vital Sign     Days of Exercise per Week: 7 days     Minutes of Exercise per Session: 90 min   Recent Concern: Physical Activity - Inactive (1/29/2024)    Exercise Vital Sign     Days of Exercise per Week: 0 days     Minutes of Exercise per Session: 0 min   Stress: No Stress Concern Present (4/15/2024)    Citizen of [place] [employer] - Occupational Stress Questionnaire     Feeling of Stress : Only a little   Recent Concern: Stress - Stress Concern Present (1/29/2024)    Citizen [place] [employer] - Occupational Stress Questionnaire     Feeling of Stress : To some extent   Utilities: Not on file   Social Isolation: Not on file (5/3/2024)      Review of patient's allergies indicates:   Allergen Reactions    Opioids - morphine analogues Other (See Comments)     Bowel issues; bowel obstruction    Tizanidine Other (See Comments)     "Lips were numb,  Almost passed out."    Tramadol Hallucinations    Morphine     Opioids-meperidine and related     Ciprofloxacin Rash       Review of Systems     A full Review of Systems (ROS) was performed and was negative unless otherwise stated in the HPI.      Physical Exam     Vitals:    12/07/24 1218   BP: (!) 168/63   Pulse: 69   Resp: 18   Temp: 97.9 °F (36.6 °C)   TempSrc: Oral   SpO2: 99%   Weight: 73.5 kg (162 lb)   Height: 5' " "5" (1.651 m)        Physical Exam    Nursing note and vitals reviewed.  Constitutional: She appears well-developed and well-nourished.   HENT:   Head: Normocephalic.   Right Ear: External ear normal.   Left Ear: External ear normal.   Nose: Nose normal.   1.5 cm laceration overlying left eyebrow   Eyes: Conjunctivae and EOM are normal. Pupils are equal, round, and reactive to light.   Neck: No tracheal deviation present.   Normal range of motion.  Cardiovascular:  Normal rate and regular rhythm.           Pulmonary/Chest: Breath sounds normal. No respiratory distress. She has no wheezes.   Musculoskeletal:         General: No edema. Normal range of motion.      Cervical back: Normal range of motion. No bony tenderness. No pain with movement.      Comments: Skin tear to distal right lower extremity     Neurological: She is alert and oriented to person, place, and time. She has normal strength. No cranial nerve deficit or sensory deficit. Gait normal.   Skin: Skin is warm and dry.   Psychiatric: She has a normal mood and affect. Her behavior is normal. Thought content normal.         Lab Results- Independently reviewed by myself      Labs Reviewed   URINALYSIS, REFLEX TO URINE CULTURE - Abnormal       Result Value    Specimen UA Urine, Clean Catch      Color, UA Yellow      Appearance, UA Hazy (*)     pH, UA 7.0      Specific Gravity, UA 1.010      Protein, UA Negative      Glucose, UA Negative      Ketones, UA Negative      Bilirubin (UA) Negative      Occult Blood UA Trace (*)     Nitrite, UA Negative      Urobilinogen, UA Negative      Leukocytes, UA 3+ (*)     Narrative:     Specimen Source->Urine   URINALYSIS MICROSCOPIC - Abnormal    RBC, UA 5 (*)     WBC, UA >100 (*)     WBC Clumps, UA Few (*)     Bacteria Occasional      Squam Epithel, UA 1      Microscopic Comment SEE COMMENT      Narrative:     Specimen Source->Urine   CULTURE, URINE           Imaging     Imaging Results              CT Cervical Spine " Without Contrast (Final result)  Result time 12/07/24 14:47:27      Final result by Venu Corley MD (12/07/24 14:47:27)                   Impression:      No acute cervical fracture.      Electronically signed by: Venu Corley  Date:    12/07/2024  Time:    14:47               Narrative:    EXAMINATION:  CT CERVICAL SPINE WITHOUT CONTRAST    CLINICAL HISTORY:  Neck trauma (Age >= 65y);    TECHNIQUE:  Low dose axial images, sagittal and coronal reformations were performed though the cervical spine.  Contrast was not administered.    COMPARISON:  MRI 02/02/2022    FINDINGS:  No acute cervical fracture.    The prevertebral soft tissues are unremarkable.    Degenerative retrolisthesis of C3 on C4 with endplate osteophyte formation at C3-4 through C6-7 that flattens the traversing cord most advanced at C4-5 where there may be an element of cord impingement, grossly similar to the prior study.                                       CT Head Without Contrast (Final result)  Result time 12/07/24 14:41:12      Final result by Venu Corley MD (12/07/24 14:41:12)                   Impression:      No acute intracranial hemorrhage/injury.      Electronically signed by: Venu Corley  Date:    12/07/2024  Time:    14:41               Narrative:    EXAMINATION:  CT HEAD WITHOUT CONTRAST    CLINICAL HISTORY:  Head trauma, minor (Age >= 65y);    TECHNIQUE:  Low dose axial images were obtained through the head.  Coronal and sagittal reformations were also performed. Contrast was not administered.    COMPARISON:  CT 01/25/2023 MRI 08/02/2021    FINDINGS:  There is no evidence of hydrocephalus, mass effect, intracranial hemorrhage or acute territorial infarct.    Decreased attenuation in the periventricular white matter is nonspecific but may reflect mild chronic small vessel ischemic change.    No acute calvarial fracture is identified.  Scalp soft tissue swelling left frontotemporal region the visualized sinuses and  mastoid air cells are clear.                                       X-Ray Elbow Complete Left (Final result)  Result time 12/07/24 13:44:11      Final result by Rohith Rios MD (12/07/24 13:44:11)                   Impression:      No compelling evidence of a displaced fracture or dislocation.      Electronically signed by: Rohith Rios MD  Date:    12/07/2024  Time:    13:44               Narrative:    EXAMINATION:  XR ELBOW COMPLETE 3 VIEW LEFT    CLINICAL HISTORY:  . Unspecified fall, initial encounter    TECHNIQUE:  AP, lateral, and oblique views of the elbow were performed.    COMPARISON:  02/16/2024    FINDINGS:  Partially visualized intramedullary krysten in the humerus.  No evidence of acute displaced fracture or focal osseous destructive lesion.  No evidence of dislocation.  No advanced degenerative change.  No demonstrable joint effusion.                                    X-Rays:   Independently Interpreted Readings:   Head CT: No hemorrhage.  No skull fracture.   Other Readings:  Independent Interpretation of XR:   No acute fractures or dislocations                ED Course         Lac Repair    Date/Time: 12/7/2024 2:07 PM    Performed by: Percy Avendaño MD  Authorized by: Percy Avendaño MD    Consent:     Consent obtained:  Verbal    Consent given by:  Parent    Risks, benefits, and alternatives were discussed: yes      Risks discussed:  Need for additional repair, infection and pain    Alternatives discussed:  No treatment and observation  Universal protocol:     Procedure explained and questions answered to patient or proxy's satisfaction: yes      Patient identity confirmed:  Verbally with patient and hospital-assigned identification number  Anesthesia:     Anesthesia method:  None  Laceration details:     Location:  Face    Face location:  Forehead    Length (cm):  1.5  Exploration:     Limited defect created (wound extended): no      Hemostasis achieved with:  Direct pressure    Wound  exploration: wound explored through full range of motion and entire depth of wound visualized      Contaminated: no    Treatment:     Area cleansed with:  Saline    Amount of cleaning:  Standard    Irrigation method:  Pressure wash    Debridement:  None    Undermining:  None  Skin repair:     Repair method:  Tissue adhesive  Approximation:     Approximation:  Close  Repair type:     Repair type:  Simple  Post-procedure details:     Dressing:  Open (no dressing)    Procedure completion:  Tolerated well, no immediate complications           Orders Placed This Encounter    LACERATION REPAIR    Urine culture    X-Ray Elbow Complete Left    CT Head Without Contrast    CT Cervical Spine Without Contrast    Urinalysis, Reflex to Urine Culture Urine, Clean Catch    Urinalysis Microscopic    Ambulatory referral/consult to Outpatient Case Management    Ambulatory referral/consult to [facility]    Ambulatory referral/consult to Neurology    acetaminophen tablet 1,000 mg    cefpodoxime (VANTIN) 100 MG tablet          ED Course as of 12/08/24 0901   Sat Dec 07, 2024   1236 Reviewed records of urology office visit 09/17 for recurrent UTIs.  Also reviewed urgent care visits in October with similar complaints. [KB]   1236 Reviewed laboratory results for urinalysis in September and October along with urine cultures in September to on October both showing e Coli. [KB]      ED Course User Index  [KB] Percy Avendaño MD              Medical Decision Making       The patient's list of active medical problems, social history, medications, and allergies as documented per RN staff has been reviewed.           Medical Decision Making  This is a 89 y.o. female, presenting with head trauma from [facility].  Patient's neurological exam was non-focal and unremarkable.      [place] Head CT Rule was applied and patient did not fall into the low risk category so a head CT was obtained.  This showed no significant acute findings.      At this time,  it is felt that the most likely explanation for the patient's symptoms is closed head trauma. Patient not exhibiting any overt signs of trauma.  I also considered SAH, SDH, Epidural Hematoma, IPH, skull fracture, migraine but this appears less likely considering the data gathered thus far.       This patient presents with symptoms consistent with acute uncomplicated cystitis. No systemic symptoms. Not septic. Well appearing. Low suspicion for acute pyelonephritis given lack of fever, CVAT, or systemic features. Low suspicion for kidney stone or infected stone. Low suspicion for ovarian torsion, PID, or appendicitis.    Given frequent reoccurrences of same bacteria, I did discuss with the daughter the possibility of colonization. Advised f/u with their urologist.     Patient remained stable and neurologically intact while in the emergency department.  Discussed warning signs that would prompt return to ED.            Amount and/or Complexity of Data Reviewed  Independent Historian: EMS     Details: EMS and pt's daughter provided significant portions of history  External Data Reviewed: labs and notes.  Labs: ordered.     Details: UA c/w possible UTI, cultures pending  Radiology: ordered and independent interpretation performed.    Risk  OTC drugs.  Prescription drug management.                    ED Prescriptions       Medication Sig Dispense Start Date End Date Auth. Provider    cefpodoxime (VANTIN) 100 MG tablet Take 1 tablet (100 mg total) by mouth 2 (two) times daily. for 7 days 14 tablet 12/7/2024 12/14/2024 Percy Avendaño MD              Clinical Impression       Follow-up Information       Follow up With Specialties Details Why Contact Info Additional Information    [facility] Family Medicine Family Medicine Schedule an appointment as soon as possible for a visit  For follow-up on today's visit. [address]  [address] [address] [phone] Please park in [address] or D and use [facility]  Dr. barber. [facility] Medical Office [facility]. elevators.    [place] - Emergency Dept Emergency Medicine Go to  As needed, If symptoms worsen [address]  [facility] [address] [phone]             Referrals:  Orders Placed This Encounter   Procedures    Ambulatory referral/consult to Outpatient Case Management     Referral Priority:   Routine     Referral Type:   Consultation     Referral Reason:   Specialty Services Required     Number of Visits Requested:   1    Ambulatory referral/consult to [facility]     Standing Status:   Future     Standing Expiration Date:   1/7/2026     Referral Priority:   Routine     Referral Type:   Consultation     Referral Reason:   Specialty Services Required     Requested Specialty:   Family Medicine     Number of Visits Requested:   1    Ambulatory referral/consult to Neurology     Standing Status:   Future     Standing Expiration Date:   1/7/2026     Referral Priority:   Routine     Referral Type:   Consultation     Referral Reason:   Specialty Services Required     Requested Specialty:   Neurology     Number of Visits Requested:   1       Disposition   ED Disposition Condition    Discharge Stable              Final diagnoses:  [W19.XXXA] Fall from standing, initial encounter  [S01.81XA] Facial laceration, initial encounter (Primary)  [S81.812A] Noninfected skin tear of leg, left, initial encounter  [N39.0] Urinary tract infection without hematuria, site unspecified  [R25.1] Occasional tremors        Percy Avendaño MD        12/08/2024          DISCLAIMER: This note was prepared with [email or web address] voice recognition transcription software. Garbled syntax, mangled pronouns, and other bizarre constructions may be attributed to that software system.       Percy Avendaño MD  12/08/24 0901       Percy Avendaño MD  12/08/24 0903

## 2024-12-07 NOTE — ED NOTES
Patient presents to ER complaining of a RLE tear and a laceration to the left temple post fall. Patient stated that she felt her legs gave out and just fell. Denies LOC.  Takes aspirin daily. Patient also complaining of tremors.

## 2024-12-09 ENCOUNTER — TELEPHONE (OUTPATIENT)
Dept: UROLOGY | Facility: CLINIC | Age: 89
End: 2024-12-09
Payer: MEDICARE

## 2024-12-09 ENCOUNTER — PATIENT OUTREACH (OUTPATIENT)
Dept: EMERGENCY MEDICINE | Facility: HOSPITAL | Age: 89
End: 2024-12-09
Payer: MEDICARE

## 2024-12-09 NOTE — TELEPHONE ENCOUNTER
----- Message from Nurse Juarez sent at 12/9/2024  4:10 PM CST -----  Regarding: FW: Advice  Contact: [phone]    ----- Message -----  From: Renuka Ingram  Sent: 12/9/2024  10:54 AM CST  To: [facility] St[facility] Staff  Subject: Advice                                           Type:  Sooner Apoointment Request    Caller is requesting a sooner appointment.  Caller declined first available appointment listed below.  Caller will not accept being placed on the waitlist and is requesting a message be sent to doctor.    Name of Caller: Jenniffer Liam Jimenez     When is the first available appointment?  3/6     Symptoms: UTI     Would the patient rather a call back or a response via [facility]? Call     Best Call Back Number: [phone]     Additional Information:  Pt states she had three UTI since last visit would like to be scheduled sooner.  Do not want to take Methenamine (HIPREX) 1 gram Tab due to rash break out.

## 2024-12-10 LAB — BACTERIA UR CULT: ABNORMAL

## 2024-12-10 NOTE — PROGRESS NOTES
Patient was seen in the ED on 12/7/24 for lam Leiva. Patient was contacted for post ED discharge navigation. They have an appointment scheduled with Dr. Shantell Ruby on 12/17/24 at 11:20. ED navigator will remind patient of appointment.

## 2024-12-11 ENCOUNTER — OUTPATIENT CASE MANAGEMENT (OUTPATIENT)
Dept: ADMINISTRATIVE | Facility: OTHER | Age: 89
End: 2024-12-11
Payer: MEDICARE

## 2024-12-11 ENCOUNTER — PATIENT OUTREACH (OUTPATIENT)
Dept: ADMINISTRATIVE | Facility: OTHER | Age: 89
End: 2024-12-11
Payer: MEDICARE

## 2024-12-11 NOTE — LETTER
Jenniffer Jimenez  [address]  [place] [address]    Dear Jenniffer Jimenez,     Welcome to [facility] Outpatient Care Management Program. We are here to assist patients with multiple long-term (chronic) conditions who often need more personalized healthcare.    It was a pleasure talking with you today. My name is Rona Washington RN. I look forward to working with you as your Care Manager. I will be contacting you by telephone routinely to help coordinate care and resolve issues.    My goal is to help you function at the healthiest and highest level possible. You can contact me directly at [phone].    As an [facility] patient with [employer], some of the services we provide, at no cost to you, include:     Development of an individualized care plan with a Registered Nurse   Connection with a   Assistance from a Community Health Worker  Connection with available resources and services    Coordinate communication among your care team members   Provide coaching and education  Help you understand your doctor's treatment plan  Help you obtain information about your insurance coverage.    All services provided by [facility]s Outpatient Care Managers and other care team members are coordinated with and communicated to your primary care team.      As part of your enrollment, you will be receiving education materials and more information about these services in your [facility] account, by phone, or through the mail. If you do not wish to participate or receive information, you can Opt Out by contacting our office at [phone].      Sincerely,    Rona Washington RN  [facility]   Outpatient Care Management

## 2024-12-11 NOTE — PROGRESS NOTES
This Community Health Worker (CHW) completed Social Determinant of Health (SDOH)  Questionnaire with patient/caregiver via telephone today.  Notified OPCM CM Lima Washington RN of completion.      Patient denied any SDOH needs at this time. Ms. Jimenez stated her rent is 2,950 dollars in [employer] which include a $200 dollar food stipend, her electricity and water. Ms. Jimenez stated she has a medical alert that she is currently paying for out of pocket. She has agreed to a call with me to [employer] to verity that she does not have an medial alert benefit. No additional needs at this time.

## 2024-12-11 NOTE — PROGRESS NOTES
Outpatient Care Management  Initial Patient Assessment    Patient: Jenniffer Jimenez  MRN: [id number]  Date of Service: 12/11/2024  Completed by: Lima Washington RN  Referral Date: 12/07/2024  Date of Eligibility: 12/9/2024  Program:   High Risk  Status: Ongoing  Effective Dates: 12/11/2024 - present  Responsible Staff: Lima Washington RN        Reason for Visit   Patient presents with    OPCM Enrollment Call    Nursing Assessment       Brief Summary:  Jenniffer Jimenez was referred by Dr Percy Avendaño for fall from standing. Patient qualifies for program based on risk score of 87.8%.   Active problem list, medical, surgical and social history reviewed. Active comorbidities include CKD 3, DM2, polyneuropathy, macular degeneration, OA, TIA, lumbar spinal stenosis, HLD, HTN, A Fib, HFpEF, normocytic anemia. Areas of need identified by patient include CHF.     Next steps:   Follow up if received educational materials  Educate on S&S of CHF  Educate on low sodium diet  Educate on daily weights    Disability Status  Is the patient alert and oriented (person, place, time, and situation)?: Alert and oriented x 4  Hearing Difficulty or Deaf: yes  Hearing Management: TTY/TDD devices  Visual Difficulty or Blind: yes  Visual and Hearing Needs Conclusion: wears glasses for reading and has a TTY device for translation when on the telephone.  Vision Management: Other  Difficulty Concentrating, Remembering or Making Decisions: no  Communication Difficulty: no  Eating/Swallowing Difficulty: no  Walking or Climbing Stairs Difficulty: yes  Walking or Climbing Stairs: ambulation difficulty, requires equipment; stair climbing difficulty, assistance 1 person; stair climbing difficulty, requires equipment  Mobility Management: uses a walker to ambulate and able to climb steps with a railing and assistance  Dressing/Bathing Difficulty: no  Toileting : Independent  Continence : Continence - Not a problem  Difficulty Managing Errands  Independently: yes  Errands Management: uses the transportation from the [employer] where she lives.  Equipment Currently Used at Home: walker, rolling; shower chair  ADL Conclusion Statement: Very independent with ADL's, uses transportation from [employer], ambulates with a rolling walker and has no problems with going to the bathroom.  She is very hard of hearing and has TTY for her phone and wears reading glasses.  Service Animal Required: no  Change in Functional Status Since Onset of Current Illness/Injury: no        Spiritual Beliefs  Spiritual, Cultural Beliefs, Holiness Practices, Values that Affect Care: no      Social History     Socioeconomic History    Marital status:    Occupational History    Occupation: retired   Tobacco Use    Smoking status: Former     Current packs/day: 0.00     Types: Cigarettes     Quit date: 10/29/1982     Years since quittin.1     Passive exposure: Never    Smokeless tobacco: Never   Substance and Sexual Activity    Alcohol use: Not Currently     Alcohol/week: 2.0 standard drinks of alcohol     Types: 2 Shots of liquor per week     Comment: rare    Drug use: No    Sexual activity: Yes   Other Topics Concern    Are you pregnant or think you may be? No    Breast-feeding No     Social Drivers of Health     Financial Resource Strain: High Risk (4/15/2024)    Overall Financial Resource Strain (CARDIA)     Difficulty of Paying Living Expenses: Very hard   Food Insecurity: Food Insecurity Present (4/15/2024)    Hunger Vital Sign     Worried About Running Out of Food in the Last Year: Sometimes true     Ran Out of Food in the Last Year: Sometimes true   Transportation Needs: No Transportation Needs (4/15/2024)    PRAPARE - Transportation     Lack of Transportation (Medical): No     Lack of Transportation (Non-Medical): No   Physical Activity: Sufficiently Active (4/15/2024)    Exercise Vital Sign     Days of Exercise per Week: 7 days     Minutes of Exercise per  Session: 90 min   Recent Concern: Physical Activity - Inactive (1/29/2024)    Exercise Vital Sign     Days of Exercise per Week: 0 days     Minutes of Exercise per Session: 0 min   Stress: No Stress Concern Present (4/15/2024)    [employer] - Occupational Stress Questionnaire     Feeling of Stress : Only a little   Recent Concern: Stress - Stress Concern Present (1/29/2024)    [place] - Occupational Stress Questionnaire     Feeling of Stress : To some extent   Housing Stability: Low Risk  (4/15/2024)    Housing Stability Vital Sign     Unable to Pay for Housing in the Last Year: No     Homeless in the Last Year: No       Roles and Relationships  Primary Source of Support/Comfort: child(harpal)  Name of Support/Comfort Primary Source: Afshan  Primary Roles/Responsibilities: retired  Secondary Source of Support/Comfort: friend  Name of Support/Comfort Secondary Source: Emelina      Advance Directives (For Healthcare)  Advance Directive  (If Adv Dir status is received, view document under Adv Dir in header or Chart Review Media tab): Patient has advance directive, copy not received.        Patient Reported Insurance  Verified current insurance plan:: [employer]age  [employer] benefits discussed:: [facility]; Mail Order Pharmacy            12/11/2024    11:49 AM 8/15/2024     1:25 PM 4/22/2024     4:19 PM 2/15/2024     9:50 AM 1/26/2024     3:49 PM 8/15/2023     9:43 AM 5/25/2023     7:58 AM   Depression Patient Health Questionnaire   Over the last two weeks how often have you been bothered by little interest or pleasure in doing things Not at all Not at all Not at all Not at all Not at all Not at all Not at all   Over the last two weeks how often have you been bothered by feeling down, depressed or hopeless Not at all Not at all Not at all Not at all Not at all Not at all Not at all   PHQ-2 Total Score 0 0 0 0 0 0 0       Learning Assessment        12/11/2024 1218 [facility] (12/11/2024 - Present)   Created by Lima Washington RN -  (Nurse) Status: Complete                 PRIMARY LEARNER     Primary Learner Name:  Jenniffer Jimenez DB - 12/11/2024 1218    Relationship:  Patient DB - 12/11/2024 1218    Does the primary learner have any barriers to learning?:  No Barriers DB - 12/11/2024 1218    What is the preferred language of the primary learner?:  English DB - 12/11/2024 1218    Is an  required?:  No  - 12/11/2024 1218    How does the primary learner prefer to learn new concepts?:  Listening, Reading DB - 12/11/2024 1218    How often do you need to have someone help you read instructions, pamphlets, or written material from your doctor or pharmacy?:  Never DB - 12/11/2024 1218        CO-LEARNER #1     No question answered        CO-LEARNER #2     No question answered        SPECIAL TOPICS     No question answered        ANSWERED BY:     No question answered        Comments         Edit History       Lima Washington, RN -  (Nurse)   12/11/2024 1218

## 2024-12-16 ENCOUNTER — PATIENT OUTREACH (OUTPATIENT)
Dept: EMERGENCY MEDICINE | Facility: HOSPITAL | Age: 89
End: 2024-12-16
Payer: MEDICARE

## 2024-12-17 ENCOUNTER — OFFICE VISIT (OUTPATIENT)
Dept: UROLOGY | Facility: CLINIC | Age: 89
End: 2024-12-17
Payer: MEDICARE

## 2024-12-17 ENCOUNTER — PATIENT OUTREACH (OUTPATIENT)
Dept: ADMINISTRATIVE | Facility: OTHER | Age: 89
End: 2024-12-17
Payer: MEDICARE

## 2024-12-17 VITALS
WEIGHT: 166.69 LBS | BODY MASS INDEX: 27.73 KG/M2 | SYSTOLIC BLOOD PRESSURE: 166 MMHG | HEART RATE: 74 BPM | DIASTOLIC BLOOD PRESSURE: 73 MMHG

## 2024-12-17 DIAGNOSIS — N39.0 RECURRENT UTI: Primary | ICD-10-CM

## 2024-12-17 PROCEDURE — 3288F FALL RISK ASSESSMENT DOCD: CPT | Mod: CPTII,S$GLB,, | Performed by: UROLOGY

## 2024-12-17 PROCEDURE — 99214 OFFICE O/P EST MOD 30 MIN: CPT | Mod: S$GLB,,, | Performed by: UROLOGY

## 2024-12-17 PROCEDURE — 1100F PTFALLS ASSESS-DOCD GE2>/YR: CPT | Mod: CPTII,S$GLB,, | Performed by: UROLOGY

## 2024-12-17 PROCEDURE — 1126F AMNT PAIN NOTED NONE PRSNT: CPT | Mod: CPTII,S$GLB,, | Performed by: UROLOGY

## 2024-12-17 PROCEDURE — 99999 PR PBB SHADOW E&M-EST. PATIENT-LVL III: CPT | Mod: PBBFAC,,, | Performed by: UROLOGY

## 2024-12-17 PROCEDURE — 1159F MED LIST DOCD IN RCRD: CPT | Mod: CPTII,S$GLB,, | Performed by: UROLOGY

## 2024-12-17 NOTE — PROGRESS NOTES
Community Health Worker attempted to contact patient via telephone to assist with [employer] call. .  Will attempt again at a later date.

## 2024-12-17 NOTE — PROGRESS NOTES
History of Present Illness    CHIEF COMPLAINT:  Ms. Jimenez presents today for follow up of recurrent urinary tract infections.    URINARY TRACT INFECTIONS:  She reports experiencing 2-3 UTIs since last visit. First episode was treated at urgent care where she developed a medication-induced rash requiring discontinuation. Most recent UTI was diagnosed in the emergency room following a fall requiring ambulance transport.    MEDICATIONS:  She is currently taking methenamine but reports it is not effective. She also uses Urinary Tract Complete supplement.    IMAGING:  Renal ultrasound on 8- showed no hydronephrosis, stones, or masses.      ROS:  General: -fever, -chills, -fatigue, -weight gain, -weight loss  Eyes: -vision changes, -redness, -discharge  ENT: -ear pain, -nasal congestion, -sore throat  Cardiovascular: -chest pain, -palpitations, -lower extremity edema  Respiratory: -cough, -shortness of breath  Gastrointestinal: -abdominal pain, -nausea, -vomiting, -diarrhea, -constipation, -blood in stool  Genitourinary: -dysuria, -hematuria, -frequency  Musculoskeletal: -joint pain, -muscle pain  Skin: +rash, -lesion  Neurological: -headache, -dizziness, -numbness, -tingling  Psychiatric: -anxiety, -depression, -sleep difficulty         PATIENT HISTORY:    Past Medical History:   Diagnosis Date    Amblyopia of left eye 04/10/2013    Arthritis     Facet arthropathy, Lumbosacral    Atherosclerosis of aorta     Cataract     Central retinal vein occlusion of left eye     CKD (chronic kidney disease) stage 3, GFR 30-59 ml/min     Diabetes mellitus, type 2     Diabetic polyneuropathy 01/06/2022    Exotropia of both eyes 02/06/2013    recession RSR 5.0mm w/ adj; recession LR os 5.0 w/ adj; resect MR os  4.0mm    Hearing loss     History of resection of small bowel     Hypertensive retinopathy of both eyes     Hypoglycemia     Macular degeneration     OA (osteoarthritis) of shoulder     Right    Osteoporosis      Posterior vitreous detachment of both eyes     Psychiatric problem     Rhinitis     TIA (transient ischemic attack)        Past Surgical History:   Procedure Laterality Date    APPENDECTOMY      CARDIAC CATHETERIZATION      CATARACT EXTRACTION W/  INTRAOCULAR LENS IMPLANT Bilateral      SECTION, CLASSIC      CLOSURE OF LEFT ATRIAL APPENDAGE USING DEVICE N/A 2020    Procedure: Left atrial appendage closure device;  Surgeon: Abundio Curtis MD;  Location: [facility] CATH LAB;  Service: Cardiology;  Laterality: N/A;    HYSTERECTOMY      INNER EAR SURGERY      IRRIGATION AND DEBRIDEMENT OF UPPER EXTREMITY Right 2024    Procedure: IRRIGATION AND DEBRIDEMENT, UPPER EXTREMITY;  Surgeon: Con Moran Jr., MD;  Location: [facility] OR;  Service: Orthopedics;  Laterality: Right;    JOINT REPLACEMENT      LEFT KNEE REPLACEMENT IN 2013 -    OOPHORECTOMY      SINUS SURGERY      STRABISMUS SURGERY  13    RSR recession 5 mm, LLR recession 5 mm and LMR resection 4mm    STRABISMUS SURGERY  2014    recess LR OD 6mm    TONSILLECTOMY      watchman surgery N/A 2020       Family History   Problem Relation Name Age of Onset    Hypertension Mother      Hypertension Father      Liver disease Sister      Diabetes Sister      Heart disease Sister      Diabetes Brother      Breast cancer Maternal Aunt      Heart disease Sister       Social History     Socioeconomic History    Marital status:    Occupational History    Occupation: retired   Tobacco Use    Smoking status: Former     Current packs/day: 0.00     Types: Cigarettes     Quit date: 10/29/1982     Years since quittin.1     Passive exposure: Never    Smokeless tobacco: Never   Substance and Sexual Activity    Alcohol use: Not Currently     Alcohol/week: 2.0 standard drinks of alcohol     Types: 2 Shots of liquor per week     Comment: rare    Drug use: No    Sexual activity: Yes   Other Topics Concern    Are you pregnant or think you may be? No     Breast-feeding No     Social Drivers of Health     Financial Resource Strain: High Risk (2024)    Overall Financial Resource Strain (CARDIA)     Difficulty of Paying Living Expenses: Hard   Food Insecurity: Food Insecurity Present (2024)    Hunger Vital Sign     Worried About Running Out of Food in the Last Year: Often true     Ran Out of Food in the Last Year: Sometimes true   Transportation Needs: No Transportation Needs (2024)    PRAPARE - Transportation     Lack of Transportation (Medical): No     Lack of Transportation (Non-Medical): No   Physical Activity: Sufficiently Active (2024)    Exercise Vital Sign     Days of Exercise per Week: 7 days     Minutes of Exercise per Session: 40 min   Stress: No Stress Concern Present (2024)    [employer] - Occupational Stress Questionnaire     Feeling of Stress : Not at all   Housing Stability: Low Risk  (2024)    Housing Stability Vital Sign     Unable to Pay for Housing in the Last Year: No     Homeless in the Last Year: No       Allergies:  Opioids - morphine analogues, Tizanidine, Tramadol, Morphine, Opioids-meperidine and related, and Ciprofloxacin    Medications:  Outpatient Encounter Medications as of 2024   Medication Sig Dispense Refill    aspirin (ECOTRIN) 81 MG EC tablet Take 81 mg by mouth once daily.      [] cefpodoxime (VANTIN) 100 MG tablet Take 1 tablet (100 mg total) by mouth 2 (two) times daily. for 7 days 14 tablet 0    FLUAD QUAD ,65Y UP,,PF, 60 mcg (15 mcg x 4)/0.5 mL Syrg       furosemide (LASIX) 20 MG tablet Take 2 tablets in the a.m. for weights 155-159, take 2 tablets twice a day for weights 160 over, none for weights less than 155 120 tablet 11    hydrocortisone 2.5 % cream Apply topically 2 (two) times daily as needed (itchy rash). 30 g 1    mupirocin (BACTROBAN) 2 % ointment Apply topically to wound three times a day for one week 30 g 1    pravastatin  (PRAVACHOL) 40 MG tablet Take 1 tablet (40 mg total) by mouth once daily. 90 tablet 3    propylene glycol (SYSTANE COMPLETE OPHT) Apply to eye.      psyllium 0.52 gram capsule Take 3 capsules by mouth 3 (three) times daily as needed (constipation).      silver sulfADIAZINE 1% (SILVADENE) 1 % cream Apply to RLE skin breakdown twice daily      spironolactone (ALDACTONE) 25 MG tablet Take 1 tablet (25 mg total) by mouth 2 (two) times daily. 180 tablet 3    vitamin E 400 UNIT capsule Take 400 Units by mouth once daily. (Patient not taking: Reported on 12/11/2024)      [DISCONTINUED] methenamine (HIPREX) 1 gram Tab Take 1 tablet (1 g total) by mouth 2 (two) times daily. 60 tablet 5     Facility-Administered Encounter Medications as of 12/17/2024   Medication Dose Route Frequency Provider Last Rate Last Admin    doxycycline tablet 100 mg  100 mg Oral Once              PHYSICAL EXAMINATION:    The patient generally appears in good health, is appropriately interactive, and is in no apparent distress.    Skin: No lesions.    Mental: Cooperative with normal affect.    Neuro: Grossly intact.    HEENT: Normal. No evidence of lymphadenopathy.    Chest:  normal inspiratory effort.    Abdomen: Soft, non-tender. No masses or organomegaly. Bladder is not palpable. No evidence of flank discomfort. No evidence of inguinal hernia.    Extremities: No clubbing, cyanosis, or edema    LABS:    Lab Results   Component Value Date    BUN 40 (H) 08/06/2024    CREATININE 1.5 (H) 08/06/2024       UA 1.010, pH 6, otherwise, negative.     IMPRESSION:    Assessment & Plan    IMPRESSION:  - Assessed patient's recurrent UTIs and recent episodes since last visit  - Evaluated effectiveness of current preventive measures, including methenamine  - Considered alternative approaches using OTC supplements for UTI prevention  - Recommend discontinuation of methenamine due to perceived ineffectiveness  - Approved use of Uqora and Urinary Tract Complete  supplements as alternative preventive measures    URINARY TRACT INFECTION:  - Explained that supplements do not have to prove efficacy like prescription medications.  - Discussed that Uqora has shown positive results for several patients.  - Discontinued methenamine.  - Started Uqora supplement.  - Started Urinary Tract Complete supplement containing D-mannose, cranberry, and apple cider vinegar.  - Go to urgent care for acute bladder infections, as not always available in the office.    INCOMPLETE BLADDER EMPTYING:  - Ms. Jimenez to continue double voiding to address incomplete bladder emptying.    GENERAL CARE:  - patient asked for several names as she would prefer to have a female primary at [place].  Several names were provided.     FOLLOW-UP:  - Follow up in 3 months.       I spent a total of 30 minutes on the day of the visit.  This includes face to face time and non-face to face time preparing to see the patient (eg, review of tests), obtaining and/or reviewing separately obtained history, documenting clinical information in the electronic or other health record, independently interpreting results and communicating results to the patient/family/caregiver, or care coordinator.      This note was generated with the assistance of ambient listening technology. Verbal consent was obtained by the patient and accompanying visitor(s) for the recording of patient appointment to facilitate this note. I attest to having reviewed and edited the generated note for accuracy, though some syntax or spelling errors may persist. Please contact the author of this note for any clarification.

## 2024-12-18 ENCOUNTER — OUTPATIENT CASE MANAGEMENT (OUTPATIENT)
Dept: ADMINISTRATIVE | Facility: OTHER | Age: 89
End: 2024-12-18
Payer: MEDICARE

## 2024-12-18 NOTE — LETTER
Jenniffer Jimenez  [address]  [place] [address]      Dear Jenniffer Jimenez,     I am your nurse with [facility] Department. I was unsuccessful in reaching you today. At your earliest convenience, I would like to discuss your healthcare progress.      Please contact me at [phone] from 8:00AM to 4:30 PM on Monday thru Friday.     As you know, [facility] is a program offered to you through [facility] where a nurse is available 24/7 to answer questions or provide medical advice, their number is [phone].    Thanks,    Rona Washington RN  Outpatient Care Management

## 2024-12-20 ENCOUNTER — PATIENT OUTREACH (OUTPATIENT)
Dept: ADMINISTRATIVE | Facility: OTHER | Age: 89
End: 2024-12-20
Payer: MEDICARE

## 2024-12-20 NOTE — PROGRESS NOTES
CHW - Case Closure    CHW has contacted [employer] to verify if Ms. Patel has any life alert benefits. Rep was unable to see.informed of any available benefits listed. I have also search [employer] site and was informed Medicare does not cover life alerts. Case closed

## 2025-01-16 ENCOUNTER — OUTPATIENT CASE MANAGEMENT (OUTPATIENT)
Dept: ADMINISTRATIVE | Facility: OTHER | Age: OVER 89
End: 2025-01-16
Payer: MEDICARE

## 2025-01-16 ENCOUNTER — PATIENT MESSAGE (OUTPATIENT)
Dept: ADMINISTRATIVE | Facility: HOSPITAL | Age: OVER 89
End: 2025-01-16
Payer: MEDICARE

## 2025-01-16 NOTE — PROGRESS NOTES
Outpatient Care Management  Plan of Care Follow Up Visit    Patient: Jenniffer Jimenez  MRN: [id number]  Date of Service: 01/16/2025  Completed by: Lima Washington RN  Referral Date: 12/07/2024    Reason for Visit   Patient presents with    Case Closure    Nursing Assessment       Brief Summary: SDOH completed by Abiola PEDROZA and reviewed by this CM.  Patient had inquiry into medic alert bracelet being covered by insurance. Medicare does not cover medic alert bracelets.     Case closure, unable to reach patient.

## 2025-01-17 ENCOUNTER — HOSPITAL ENCOUNTER (EMERGENCY)
Facility: HOSPITAL | Age: OVER 89
Discharge: HOME | End: 2025-01-17
Attending: EMERGENCY MEDICINE
Payer: MEDICARE

## 2025-01-17 VITALS
BODY MASS INDEX: 27.16 KG/M2 | HEART RATE: 66 BPM | OXYGEN SATURATION: 98 % | HEIGHT: 65 IN | RESPIRATION RATE: 16 BRPM | WEIGHT: 163 LBS | SYSTOLIC BLOOD PRESSURE: 131 MMHG | DIASTOLIC BLOOD PRESSURE: 68 MMHG | TEMPERATURE: 98 F
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
## 2025-01-17 DIAGNOSIS — S51.819A SKIN TEAR OF FOREARM WITHOUT COMPLICATION, INITIAL ENCOUNTER: ICD-10-CM

## 2025-01-17 PROCEDURE — 99283 EMERGENCY DEPT VISIT LOW MDM: CPT | Mod: HCNC,25

## 2025-01-17 PROCEDURE — 63600175 PHARM REV CODE 636 W HCPCS: Mod: HCNC | Performed by: PHYSICIAN ASSISTANT

## 2025-01-17 PROCEDURE — 12032 INTMD RPR S/A/T/EXT 2.6-7.5: CPT | Mod: HCNC

## 2025-01-17 PROCEDURE — 25000003 PHARM REV CODE 250: Mod: HCNC | Performed by: PHYSICIAN ASSISTANT

## 2025-01-17 RX ORDER — AMOXICILLIN AND CLAVULANATE POTASSIUM 875; 125 MG/1; MG/1
1 TABLET, FILM COATED ORAL 2 TIMES DAILY
Qty: 14 TABLET | Refills: 0 | Status: SHIPPED | OUTPATIENT
Start: 2025-01-17 | End: 2025-01-24

## 2025-01-17 RX ORDER — LIDOCAINE HYDROCHLORIDE 10 MG/ML
5 INJECTION, SOLUTION EPIDURAL; INFILTRATION; INTRACAUDAL; PERINEURAL
Status: COMPLETED | OUTPATIENT
Start: 2025-01-17 | End: 2025-01-17

## 2025-01-17 RX ORDER — AMOXICILLIN AND CLAVULANATE POTASSIUM 875; 125 MG/1; MG/1
1 TABLET, FILM COATED ORAL
Status: COMPLETED | OUTPATIENT
Start: 2025-01-17 | End: 2025-01-17

## 2025-01-17 RX ORDER — ACETAMINOPHEN 500 MG
1000 TABLET ORAL
Status: COMPLETED | OUTPATIENT
Start: 2025-01-17 | End: 2025-01-17

## 2025-01-17 RX ADMIN — AMOXICILLIN AND CLAVULANATE POTASSIUM 1 TABLET: 875; 125 TABLET, FILM COATED ORAL at 02:01

## 2025-01-17 RX ADMIN — ACETAMINOPHEN 1000 MG: 500 TABLET ORAL at 01:01

## 2025-01-17 RX ADMIN — LIDOCAINE HYDROCHLORIDE 50 MG: 10 INJECTION, SOLUTION EPIDURAL; INFILTRATION; INTRACAUDAL at 01:01

## 2025-01-17 NOTE — ED TRIAGE NOTES
Patient arrived for the chief complaint of a cat scratch causing a skin tear. Patient endorses cat up to date with its vaccines, cat did not bite. Patient currently is on blood thinners.

## 2025-01-17 NOTE — DISCHARGE INSTRUCTIONS
You may go to your primary care doctor, urgent care or ED in 10 days for suture removal.  I have prescribed you antibiotics to help prevent any infection.  Please keep the area clean you may wash with regular soap and water.  If you develop any signs of infection, lymph node swelling or new or worsening symptoms please return to ED sooner.

## 2025-01-17 NOTE — ED PROVIDER NOTES
"Encounter Date: 2025       History     Chief Complaint   Patient presents with    Animal Bite     Pt arrived to ED via pov from home with complaint of cat scratch to left forearm. Pt is currently on blood thinners.      89-year-old female with past medical history of atrial fibrillation, DM T2, CKD, hypertension, TIA presents ED for a cat scratch to the left forearm.  Patient states they were attempting to give her cat medication was holding her down when she scratches to the left forearm.  States her cat is up-to-date on vaccinations denies bitten.  Per chart review tetanus was updated last year.        Review of patient's allergies indicates:   Allergen Reactions    Opioids - morphine analogues Other (See Comments)     Bowel issues; bowel obstruction    Tizanidine Other (See Comments)     "Lips were numb,  Almost passed out."    Tramadol Hallucinations    Morphine     Opioids-meperidine and related     Ciprofloxacin Rash     Past Medical History:   Diagnosis Date    Amblyopia of left eye 04/10/2013    Arthritis     Facet arthropathy, Lumbosacral    Atherosclerosis of aorta     Cataract     Central retinal vein occlusion of left eye     CKD (chronic kidney disease) stage 3, GFR 30-59 ml/min     Diabetes mellitus, type 2     Diabetic polyneuropathy 2022    Exotropia of both eyes 2013    recession RSR 5.0mm w/ adj; recession LR os 5.0 w/ adj; resect MR os  4.0mm    Hearing loss     History of resection of small bowel     Hypertensive retinopathy of both eyes     Hypoglycemia     Macular degeneration     OA (osteoarthritis) of shoulder     Right    Osteoporosis     Posterior vitreous detachment of both eyes     Psychiatric problem     Rhinitis     TIA (transient ischemic attack)      Past Surgical History:   Procedure Laterality Date    APPENDECTOMY      CARDIAC CATHETERIZATION      CATARACT EXTRACTION W/  INTRAOCULAR LENS IMPLANT Bilateral      SECTION, CLASSIC      CLOSURE OF LEFT ATRIAL " APPENDAGE USING DEVICE N/A 2020    Procedure: Left atrial appendage closure device;  Surgeon: Abundio Curtis MD;  Location: [facility] CATH LAB;  Service: Cardiology;  Laterality: N/A;    HYSTERECTOMY      INNER EAR SURGERY      IRRIGATION AND DEBRIDEMENT OF UPPER EXTREMITY Right 2024    Procedure: IRRIGATION AND DEBRIDEMENT, UPPER EXTREMITY;  Surgeon: Con Moran Jr., MD;  Location: [facility] OR;  Service: Orthopedics;  Laterality: Right;    JOINT REPLACEMENT      LEFT KNEE REPLACEMENT IN 2013 -    OOPHORECTOMY      SINUS SURGERY      STRABISMUS SURGERY  13    RSR recession 5 mm, LLR recession 5 mm and LMR resection 4mm    STRABISMUS SURGERY  2014    recess LR OD 6mm    TONSILLECTOMY      watchman surgery N/A 2020     Family History   Problem Relation Name Age of Onset    Hypertension Mother      Hypertension Father      Liver disease Sister      Diabetes Sister      Heart disease Sister      Diabetes Brother      Breast cancer Maternal Aunt      No Known Problems Maternal Uncle      No Known Problems Paternal Aunt      No Known Problems Paternal Uncle      No Known Problems Maternal Grandmother      No Known Problems Maternal Grandfather      No Known Problems Paternal Grandmother      No Known Problems Paternal Grandfather      No Known Problems Daughter      No Known Problems Son      Heart disease Sister      No Known Problems Son      No Known Problems Son      Cancer Neg Hx          in first degree relatives    Melanoma Neg Hx      Psoriasis Neg Hx      Lupus Neg Hx      Amblyopia Neg Hx      Blindness Neg Hx      Cataracts Neg Hx      Glaucoma Neg Hx      Macular degeneration Neg Hx      Retinal detachment Neg Hx      Strabismus Neg Hx      Stroke Neg Hx      Thyroid disease Neg Hx       Social History     Tobacco Use    Smoking status: Former     Current packs/day: 0.00     Types: Cigarettes     Quit date: 10/29/1982     Years since quittin.2     Passive exposure: Never     Smokeless tobacco: Never   Substance Use Topics    Alcohol use: Not Currently     Alcohol/week: 2.0 standard drinks of alcohol     Types: 2 Shots of liquor per week     Comment: rare    Drug use: No     Review of Systems    Physical Exam     Initial Vitals [01/17/25 1220]   BP Pulse Resp Temp SpO2   (!) 141/62 81 16 98.1 °F (36.7 °C) 100 %      MAP       --         Physical Exam    Nursing note and vitals reviewed.  Constitutional: She appears well-developed and well-nourished.   HENT:   Head: Normocephalic and atraumatic.   Eyes: Conjunctivae are normal. Pupils are equal, round, and reactive to light.   Neck: Neck supple.   Normal range of motion.  Cardiovascular:  Normal rate.           Pulmonary/Chest: No respiratory distress.   Abdominal: She exhibits no distension.   Musculoskeletal:      Cervical back: Normal range of motion and neck supple.     Neurological: She is alert and oriented to person, place, and time.   Skin: Skin is warm and dry.   There is a 6 cm L-shaped skin tear to the left forearm.  With extensive surrounding bruising.  Upper extremities neurovascularly intact.         ED Course   Lac Repair    Date/Time: 1/17/2025 2:12 PM    Performed by: Maddie Kemp PA-C  Authorized by: Sarabjit Man MD    Consent:     Consent obtained:  Verbal    Consent given by:  Patient    Risks discussed:  Infection, need for additional repair, nerve damage, poor wound healing, poor cosmetic result and pain    Alternatives discussed:  No treatment and delayed treatment  Universal protocol:     Patient identity confirmed:  Verbally with patient and arm band  Anesthesia:     Anesthesia method:  Local infiltration    Local anesthetic:  Lidocaine 1% w/o epi  Laceration details:     Location:  Shoulder/arm    Shoulder/arm location:  L lower arm    Length (cm):  6  Exploration:     Hemostasis achieved with:  Direct pressure    Wound exploration: wound explored through full range of motion      Contaminated: no     Treatment:     Area cleansed with:  Povidone-iodine and saline    Amount of cleaning:  Extensive    Irrigation solution:  Sterile water    Irrigation method:  Pressure wash  Skin repair:     Repair method:  Sutures    Suture size:  4-0    Suture material:  Nylon    Number of sutures:  10  Approximation:     Approximation:  Close  Repair type:     Repair type:  Simple  Post-procedure details:     Dressing:  Non-adherent dressing    Procedure completion:  Tolerated well, no immediate complications    Labs Reviewed - No data to display       Imaging Results    None          Medications   LIDOcaine (PF) 10 mg/ml (1%) injection 50 mg (50 mg Infiltration Given 1/17/25 1340)   acetaminophen tablet 1,000 mg (1,000 mg Oral Given 1/17/25 1336)     Medical Decision Making  89-year-old female presents emergency department for cat scratch to the left arm.  She has a skin tear to the left forearm.  Tetanus is up-to-date.  This is her personal CT with a provoked attack denies any bite no indication for rabies prophylaxis.  Wound was copiously irrigated with a pressure wash with 50 50 Betadine normal saline and laceration repaired with 10 4-0 nylon sutures.  Onset on wound care.  Will cover with Augmentin.  Counseled on return to ED precaution as well as signs and symptoms of cat scratch fever.  Recommend return to ED/urgent care or PCP in 10 days for suture removal.    Risk  OTC drugs.  Prescription drug management.                                      Clinical Impression:  Final diagnoses:  [W55.03XA] Cat scratch (Primary)  [S51.819A] Skin tear of forearm without complication, initial encounter          ED Disposition Condition    Discharge Stable          ED Prescriptions       Medication Sig Dispense Start Date End Date Auth. Provider    amoxicillin-clavulanate 875-125mg (AUGMENTIN) 875-125 mg per tablet Take 1 tablet by mouth 2 (two) times daily. for 7 days 14 tablet 1/17/2025 1/24/2025 Maddie Kemp PA-C          Follow-up  Information       Follow up With Specialties Details Why Contact Info    Reed Ruiz MD Internal Medicine Schedule an appointment as soon as possible for a visit  As needed [address] [facility] [address]  [phone]      [facility] - Emergency Dept Emergency Medicine Schedule an appointment as soon as possible for a visit in 10 days  [address]  [facility] [address]  [phone]             Maddie Kemp PA-C  01/17/25 1422

## 2025-01-24 DIAGNOSIS — I50.31 ACUTE HEART FAILURE WITH PRESERVED EJECTION FRACTION: Chronic | ICD-10-CM

## 2025-01-24 DIAGNOSIS — E78.00 PURE HYPERCHOLESTEROLEMIA: ICD-10-CM

## 2025-01-24 RX ORDER — FUROSEMIDE 20 MG/1
TABLET ORAL
Qty: 120 TABLET | Refills: 11 | Status: SHIPPED | OUTPATIENT
Start: 2025-01-24

## 2025-01-24 RX ORDER — PRAVASTATIN SODIUM 40 MG/1
40 TABLET ORAL DAILY
Qty: 90 TABLET | Refills: 3 | Status: SHIPPED | OUTPATIENT
Start: 2025-01-24

## 2025-01-29 ENCOUNTER — OFFICE VISIT (OUTPATIENT)
Dept: URGENT CARE | Facility: CLINIC | Age: OVER 89
End: 2025-01-29
Payer: MEDICARE

## 2025-01-29 VITALS
HEART RATE: 68 BPM | SYSTOLIC BLOOD PRESSURE: 105 MMHG | OXYGEN SATURATION: 95 % | TEMPERATURE: 97 F | HEIGHT: 65 IN | WEIGHT: 163 LBS | BODY MASS INDEX: 27.16 KG/M2 | DIASTOLIC BLOOD PRESSURE: 58 MMHG | RESPIRATION RATE: 16 BRPM

## 2025-01-29 DIAGNOSIS — Z48.02 VISIT FOR SUTURE REMOVAL: Primary | ICD-10-CM

## 2025-01-29 NOTE — PROCEDURES
Suture Removal    Date/Time: 1/29/2025 11:15 AM  Location procedure was performed: [facility]    Performed by: Constance Bynum NP  Authorized by: Constance Bynum NP  Body area: upper extremity  Location details: left lower arm  Wound Appearance: no drainage and nontender  Sutures Removed: 10  Staples Removed: 0  Post-removal: no dressing applied  Complications: No  Specimens: No  Implants: No  Patient tolerance: Patient tolerated the procedure well with no immediate complications

## 2025-01-29 NOTE — PROGRESS NOTES
"Subjective:      Patient ID: Jenniffer Jimenez is a 89 y.o. female.    Vitals:  height is 5' 5" (1.651 m) and weight is 73.9 kg (163 lb). Her oral temperature is 97.3 °F (36.3 °C). Her blood pressure is 105/58 (abnormal) and her pulse is 68. Her respiration is 16 and oxygen saturation is 95%.     Chief Complaint: Suture / Staple Removal    89 yr old female presents to the Urgent Care with complaint of suture removal to left forearm. Patient was originally seen in the ER for laceration s/p cat scratch on 01/17/2025. Patient denies any concerns at this time.       Suture / Staple Removal  The sutures were placed 11 to 14 days ago. She tried antibiotic ointment use since the wound repair. The treatment provided significant relief. Her temperature was unmeasured prior to arrival. There has been no drainage from the wound. There is no redness present. There is no swelling present. There is no pain present. She has no difficulty moving the affected extremity or digit.       Skin:  Positive for laceration.      Objective:     Physical Exam   Constitutional: She is oriented to person, place, and time. She appears well-developed.   HENT:   Head: Head is without abrasion, without contusion and without laceration.   Mouth/Throat: Oropharynx is clear and moist and mucous membranes are normal.   Eyes: EOM and lids are normal.   Neck: Trachea normal and phonation normal. Neck supple.   Cardiovascular: Normal rate.   Pulmonary/Chest: Effort normal. No stridor. No respiratory distress.   Musculoskeletal: Normal range of motion.         General: Normal range of motion.      Left forearm: She exhibits laceration.   Neurological: She is alert and oriented to person, place, and time.   Skin: Skin is warm, dry and intact. Capillary refill takes less than 2 seconds. Lacerations - upper ext.:  left forearmNo abrasion, No burn and No ecchymosis        Psychiatric: Her speech is normal and behavior is normal. Judgment and thought content " normal.   Nursing note and vitals reviewed.      Assessment:     1. Visit for suture removal        Plan:      10 sutures removed. Patient tolerated well.   Daughter was slightly rude and patient was pleasant.     Visit for suture removal      Patient Instructions   If you were prescribed a narcotic or controlled medication, do not drive or operate heavy equipment or machinery while taking these medications.  You must understand that you've received an Urgent Care treatment only and that you may be released before all your medical problems are known or treated. You, the patient, will arrange for follow up care as instructed.  Follow up with your PCP or specialty clinic as directed within 2-5 days if not improved or as needed.  You can call [phone] to schedule an appointment with the appropriate provider.  If your condition worsens we recommend that you receive another evaluation at the emergency room immediately or contact your primary medical clinics after hours call service to discuss your concerns.  Please return here or go to the Emergency Department for any concerns or worsening of condition.

## 2025-01-29 NOTE — PATIENT INSTRUCTIONS
If you were prescribed a narcotic or controlled medication, do not drive or operate heavy equipment or machinery while taking these medications.  You must understand that you've received an Urgent Care treatment only and that you may be released before all your medical problems are known or treated. You, the patient, will arrange for follow up care as instructed.  Follow up with your PCP or specialty clinic as directed within 2-5 days if not improved or as needed.  You can call [phone] to schedule an appointment with the appropriate provider.  If your condition worsens we recommend that you receive another evaluation at the emergency room immediately or contact your primary medical clinics after hours call service to discuss your concerns.  Please return here or go to the Emergency Department for any concerns or worsening of condition.

## 2025-01-30 DIAGNOSIS — Z00.00 ENCOUNTER FOR MEDICARE ANNUAL WELLNESS EXAM: ICD-10-CM

## 2025-01-30 NOTE — H&P
[facility] - Emergency Dept  [facility] Medicine  History & Physical    Patient Name: Jenniffer Jimenez  MRN: [id number]  Patient Class: OP- Observation  Admission Date: 1/4/2023  Attending Physician: Brandy Henry MD   Primary Care Provider: Rubi Young DO         Patient information was obtained from patient, past medical records and ER records.     Subjective:     Principal Problem:Cellulitis of right lower extremity    Chief Complaint:   Chief Complaint   Patient presents with    Wound Check     To right leg, was told by her nurses to come to ed to get checks, needs antibiotics, onset July        HPI: Jenniffer Jimenez is a 89 y.o. female with a PMHx of HLD, HTN, AF s/p watchman, chronic cellulitis/ wound of her right lower extremity who presents to [facility] for evaluation of worsening redness and pain to her right lwoer extremity. Patient is a poor historian and has difficulty hearing. Per ED note, patient receives wound care for chronic RLE cellulitis. Wound care nuse noted worsening redness, weeping, and pain to her RLE. Apparently patient was supposed to be on ciprofloxacin but had a bad reaction so she has been off of it for the past 2 weeks.  The patient and has not had an alternative antibiotic prescribed but reportedly is supposed to be on an antibiotic. Patient also complains of intermittent shortness of breath recently which mostly occurs on exertion. Denies any fever, nausea, vomiting, chest pain, numbness/tingling, weakness, slurred speech, or recent falls.     ED: AFVSS. No leukocytosis. K 5.9, Cr 1.5 (BL ~1.2). EKG, CXR pending. Given IV vanc.      Past Medical History:   Diagnosis Date    Amblyopia of left eye 4/10/2013    Anxiety     Arthritis     Facet arthropathy, Lumbosacral    Cataract     Central retinal vein occlusion of left eye     Depression     Diabetic polyneuropathy 1/6/2022    Exotropia of both eyes 2/6/2013    recession RSR 5.0mm w/ adj; recession LR os 5.0 w/ adj;  Pt wondered if Dr. Walker could order another test for thyroid. She stated she just had it checked yesterday.  But in the middle of night she went to take Asprin and thinks she took thyroid medication instead?    Please call pt at     "resect MR os  4.0mm    Hearing loss     History of resection of small bowel     Hypertensive retinopathy of both eyes     Hypoglycemia     Macular degeneration     OA (osteoarthritis) of shoulder     Right    Osteoporosis     Posterior vitreous detachment of both eyes     Rhinitis     TIA (transient ischemic attack)        Past Surgical History:   Procedure Laterality Date    APPENDECTOMY      CARDIAC CATHETERIZATION      CATARACT EXTRACTION W/  INTRAOCULAR LENS IMPLANT Bilateral      SECTION, CLASSIC      CLOSURE OF LEFT ATRIAL APPENDAGE USING DEVICE N/A 2020    Procedure: Left atrial appendage closure device;  Surgeon: Abundio Curtis MD;  Location: [facility] CATH LAB;  Service: Cardiology;  Laterality: N/A;    HYSTERECTOMY      INNER EAR SURGERY      JOINT REPLACEMENT      LEFT KNEE REPLACEMENT IN 2013 -    OOPHORECTOMY      SINUS SURGERY      STRABISMUS SURGERY  13    RSR recession 5 mm, LLR recession 5 mm and LMR resection 4mm    STRABISMUS SURGERY  2014    recess LR OD 6mm    TONSILLECTOMY      watchman surgery N/A 2020       Review of patient's allergies indicates:   Allergen Reactions    Opioids - morphine analogues Other (See Comments)     Bowel issues; bowel obstruction    Tizanidine Other (See Comments)     "Lips were numb,  Almost passed out."    Tramadol Hallucinations    Beta-blockers (beta-adrenergic blocking agts) Other (See Comments)     Can not go on beta blockers for long period of time - due to taking allergy injections    Morphine     Opioids-meperidine and related     Ciprofloxacin Rash       No current facility-administered medications on file prior to encounter.     Current Outpatient Medications on File Prior to Encounter   Medication Sig    acetaminophen (TYLENOL) 500 MG tablet Take 1,000 mg by mouth 2 (two) times a day.    aspirin (ECOTRIN) 81 MG EC tablet Take 1 tablet (81 mg total) by mouth once daily.    azelastine (ASTELIN) 137 " mcg (0.1 %) nasal spray 1 spray (137 mcg total) by Nasal route 2 (two) times daily.    clotrimazole (LOTRIMIN) 1 % cream Apply topically 2 (two) times daily. Apply up to 4 weeks to affected area    diclofenac sodium (VOLTAREN) 1 % Gel Apply 2 g topically 4 (four) times daily. Use gloves to apply for 10 days    famotidine (PEPCID) 20 MG tablet Take 1 tablet (20 mg total) by mouth 2 (two) times daily. For Allergic Reaction.    fluticasone propionate (FLONASE) 50 mcg/actuation nasal spray USE 1 SPRAY IN EACH NOSTRIL ONE TIME DAILY    furosemide (LASIX) 20 MG tablet Take 1 tablet (20 mg total) by mouth 2 (two) times daily before meals. Take twice a day for Weight greater than 160 lb, once a day for weights less than 160 lb    gentamicin (GARAMYCIN) 0.1 % cream APPLY TOPICALLY TO THE AFFECTED AREA EVERY DAY AS NEEDED AS DIRECTED    guaiFENesin 100 mg/5 ml (ROBITUSSIN) 100 mg/5 mL syrup Take 15 mLs by mouth once daily.    levocetirizine (XYZAL) 5 MG tablet Take 1 tablet (5 mg total) by mouth once daily. For Allergies.    LIDOcaine (LIDODERM) 5 % Place 1 patch onto the skin once daily. Remove & Discard patch within 12 hours or as directed by provider    miconazole nitrate 2% (MICOTIN) 2 % Oint Apply topically every other day.    mupirocin (BACTROBAN) 2 % ointment Apply topically 3 (three) times daily. Apply locally every other day    pravastatin (PRAVACHOL) 40 MG tablet Take 1 tablet (40 mg total) by mouth once daily.    spironolactone (ALDACTONE) 25 MG tablet Take twice a day for Weight greater than 160 lb, once a day for weights less than 160 lb    vit C/E/Zn/coppr/lutein/zeaxan (OCUVITE LUTEIN AND ZEAXANTHIN ORAL) Take 1 capsule by mouth once daily. Lutien 25 mg, Zeaxanthin 5 mg    vitamin E 400 UNIT capsule Take 400 Units by mouth once daily.     Family History       Problem Relation (Age of Onset)    Breast cancer Maternal Aunt    Diabetes Sister, Brother    Heart disease Sister, Sister    Hypertension  Mother, Father    Liver disease Sister    No Known Problems Maternal Uncle, Paternal Aunt, Paternal Uncle, Maternal Grandmother, Maternal Grandfather, Paternal Grandmother, Paternal Grandfather, Daughter, Son, Son, Son          Tobacco Use    Smoking status: Former     Types: Cigarettes     Quit date: 10/29/1982     Years since quittin.2     Passive exposure: Never    Smokeless tobacco: Never   Substance and Sexual Activity    Alcohol use: Yes     Alcohol/week: 2.0 standard drinks     Types: 2 Shots of liquor per week     Comment: rare    Drug use: No    Sexual activity: Never     Review of Systems   Constitutional:  Negative for appetite change, chills and fever.   HENT:  Negative for congestion, trouble swallowing and voice change.    Eyes:  Negative for photophobia and visual disturbance.   Respiratory:  Positive for shortness of breath. Negative for cough, chest tightness and wheezing.    Cardiovascular:  Positive for leg swelling. Negative for chest pain and palpitations.   Gastrointestinal:  Negative for abdominal pain, constipation, diarrhea, nausea and vomiting.   Endocrine: Negative for polyphagia and polyuria.   Genitourinary:  Negative for decreased urine volume, difficulty urinating, dysuria, flank pain and hematuria.   Musculoskeletal:  Negative for arthralgias, back pain and gait problem.   Skin:  Positive for color change and wound.   Neurological:  Negative for dizziness, seizures, syncope, weakness, numbness and headaches.   Psychiatric/Behavioral:  Negative for agitation, behavioral problems and confusion.    Objective:     Vital Signs (Most Recent):  Temp: 98.1 °F (36.7 °C) (23)  Pulse: 76 (23)  Resp: 20 (23 0037)  BP: (!) 140/60 (23)  SpO2: 99 % (23)   Vital Signs (24h Range):  Temp:  [98.1 °F (36.7 °C)-98.6 °F (37 °C)] 98.1 °F (36.7 °C)  Pulse:  [76-83] 76  Resp:  [16-22] 20  SpO2:  [97 %-99 %] 99 %  BP: (140-142)/(60-83) 140/60      Weight: 76.7 kg (169 lb)  Body mass index is 28.12 kg/m².    Physical Exam  Vitals and nursing note reviewed.   Constitutional:       General: She is not in acute distress.     Appearance: She is well-developed.   HENT:      Head: Normocephalic and atraumatic.      Mouth/Throat:      Pharynx: No oropharyngeal exudate.   Eyes:      Extraocular Movements: Extraocular movements intact.      Conjunctiva/sclera: Conjunctivae normal.   Cardiovascular:      Rate and Rhythm: Normal rate and regular rhythm.      Heart sounds: Normal heart sounds.   Pulmonary:      Effort: Pulmonary effort is normal. No respiratory distress.      Breath sounds: No wheezing.      Comments: Slight diminished BS to BLL  Abdominal:      General: Bowel sounds are normal. There is no distension.      Palpations: Abdomen is soft.      Tenderness: There is no abdominal tenderness.   Musculoskeletal:         General: Swelling and tenderness present. Normal range of motion.      Cervical back: Normal range of motion and neck supple.   Lymphadenopathy:      Cervical: No cervical adenopathy.   Skin:     General: Skin is warm and dry.      Capillary Refill: Capillary refill takes less than 2 seconds.      Findings: Erythema present. No rash.      Comments: Exam limited 2/2 ace bandage in place. Erythema and wound to RLE. Good pulses and perfusion.    Neurological:      Mental Status: She is alert and oriented to person, place, and time.      Cranial Nerves: No cranial nerve deficit.      Sensory: No sensory deficit.      Coordination: Coordination normal.   Psychiatric:         Behavior: Behavior normal.         Thought Content: Thought content normal.         Judgment: Judgment normal.           Significant Labs: All pertinent labs within the past 24 hours have been reviewed.  BMP:   Recent Labs   Lab 01/04/23  2308   *      K 5.9*      CO2 20*   BUN 48*   CREATININE 1.5*   CALCIUM 9.1     CBC:   Recent Labs   Lab 01/04/23  2308  01/05/23  0050   WBC 7.29  --    HGB 10.4*  --    HCT 31.4* 29*     --        Significant Imaging: I have reviewed all pertinent imaging results/findings within the past 24 hours.    Assessment/Plan:     * Cellulitis of right lower extremity  - acute on chronic issue  - afebrile without leukocytosis  - ESR,CRP elevated  - start IV vanc (pharmD to dose)  - wound care consulted  - LE US ordered to rule out DVT  - elevated extremity  - pain control   - consider advanced imaging if no improvement in AM    JOSHUA (acute kidney injury)  - Cr 1.5, baseline ~1.2  - difficult determine volume status based on physical exam alone  - BNP and CXR pending-- decision for IVFs vs lasix pending results  - check UA  - avoid nephrotoxins, renally dose meds  - trend BMP    Chronic diastolic heart failure  - appears euvoluemic but complains of occasional SOB c/f volume overload  - BNP, CXR pending  - if normal results, will hold lasix and give IVFs  - if abnormal results, will give IV lasix   - strict I/Os, daily weights    Hyperkalemia  Stage 3b chronic kidney disease  - K 5.9, EKG pending  - likely 2/2 JOSHUA  - shift with albuterol neb and lokelma  - IVFs vs lasix as above  - monitor on tele    Chronic atrial fibrillation  - no longer on eliquis s/p Watchman implantation    Primary hypertension  - hold aldactone given JOSHUA    Pure hypercholesterolemia  - continue ASA, statin     VTE Risk Mitigation (From admission, onward)         Ordered     enoxaparin injection 30 mg  Daily         01/05/23 0040     IP VTE HIGH RISK PATIENT  Once         01/05/23 0040                   Ciera Barboza PA-C  Department of Hospital Medicine   [facility] - Emergency Dept

## 2025-02-03 ENCOUNTER — TELEPHONE (OUTPATIENT)
Dept: CARDIOLOGY | Facility: CLINIC | Age: OVER 89
End: 2025-02-03
Payer: MEDICARE

## 2025-02-03 ENCOUNTER — OFFICE VISIT (OUTPATIENT)
Dept: URGENT CARE | Facility: CLINIC | Age: OVER 89
End: 2025-02-03
Payer: MEDICARE

## 2025-02-03 VITALS
BODY MASS INDEX: 27.16 KG/M2 | SYSTOLIC BLOOD PRESSURE: 130 MMHG | RESPIRATION RATE: 18 BRPM | OXYGEN SATURATION: 98 % | WEIGHT: 163 LBS | HEIGHT: 65 IN | TEMPERATURE: 98 F | HEART RATE: 72 BPM | DIASTOLIC BLOOD PRESSURE: 73 MMHG

## 2025-02-03 DIAGNOSIS — N39.0 ACUTE UTI: Primary | ICD-10-CM

## 2025-02-03 LAB
BILIRUBIN, UA POC OHS: NEGATIVE
BLOOD, UA POC OHS: ABNORMAL
CLARITY, UA POC OHS: ABNORMAL
COLOR, UA POC OHS: YELLOW
GLUCOSE, UA POC OHS: NEGATIVE
KETONES, UA POC OHS: NEGATIVE
LEUKOCYTES, UA POC OHS: ABNORMAL
NITRITE, UA POC OHS: NEGATIVE
PH, UA POC OHS: 7
PROTEIN, UA POC OHS: 30
SPECIFIC GRAVITY, UA POC OHS: 1.01
UROBILINOGEN, UA POC OHS: 0.2

## 2025-02-03 PROCEDURE — 87086 URINE CULTURE/COLONY COUNT: CPT | Mod: HCNC | Performed by: PHYSICIAN ASSISTANT

## 2025-02-03 PROCEDURE — 81003 URINALYSIS AUTO W/O SCOPE: CPT | Mod: QW,S$GLB,, | Performed by: PHYSICIAN ASSISTANT

## 2025-02-03 PROCEDURE — 99213 OFFICE O/P EST LOW 20 MIN: CPT | Mod: S$GLB,,, | Performed by: PHYSICIAN ASSISTANT

## 2025-02-03 RX ORDER — CEPHALEXIN 500 MG/1
500 CAPSULE ORAL EVERY 12 HOURS
Qty: 14 CAPSULE | Refills: 0 | Status: SHIPPED | OUTPATIENT
Start: 2025-02-03 | End: 2025-02-10

## 2025-02-03 NOTE — PATIENT INSTRUCTIONS
If your condition worsens or fails to improve we recommend that you receive another evaluation at the ER immediately or contact your PCP to discuss your concerns or return here. You must understand that you've received an urgent care treatment only and that you may be released before all your medical problems are known or treated. You the patient will arrange for followup care as instructed.     If you had cultures done it will take 3-5 days to result. We will call you with the result.   Take antibiotic as prescribed with food.    Cranberry juice may help. Get the 100% cranberry juice and mix 4 oz of juice with 4 oz of water and drink this 8 oz glass of liquid once a day.   Increase water intake to at least 8-10 glasses/day.      Avoid caffeine, alcohol, or spicy foods as they irritate the bladder.      If symptoms do not improve in 2-3 days please return for evaluation

## 2025-02-03 NOTE — PROGRESS NOTES
"Subjective:      Patient ID: Jenniffer Jimenez is a 89 y.o. female.    Vitals:  height is 5' 5" (1.651 m) and weight is 73.9 kg (163 lb). Her oral temperature is 98.1 °F (36.7 °C). Her blood pressure is 130/73 and her pulse is 72. Her respiration is 18 and oxygen saturation is 98%.     Chief Complaint: Urinary Tract Infection    This is a 89 y.o. female who presents today with a chief complaint of  UTI. Patient s symptoms started 7 days ago.       Urinary Tract Infection   This is a new problem. The current episode started in the past 7 days. The problem occurs every urination. The problem has been gradually worsening. The quality of the pain is described as burning. The pain is at a severity of 9/10. The pain is severe. There has been no fever. She is Not sexually active. There is No history of pyelonephritis. Associated symptoms include frequency and urgency. Pertinent negatives include no chills, hematuria, nausea, vomiting, constipation or rash. She has tried acetaminophen (tylenol) for the symptoms. The treatment provided mild relief. Her past medical history is significant for recurrent UTIs.       Constitution: Negative for appetite change, chills and fever.   Cardiovascular:  Negative for chest pain.   Respiratory:  Negative for shortness of breath.    Gastrointestinal:  Negative for abdominal pain, nausea, vomiting, constipation and diarrhea.   Genitourinary:  Positive for dysuria, frequency and urgency. Negative for hematuria, vaginal discharge, vaginal bleeding, vaginal odor, genital sore and pelvic pain.   Musculoskeletal:  Positive for back pain.   Skin:  Negative for rash.      Past Medical History:   Diagnosis Date    Amblyopia of left eye 04/10/2013    Arthritis     Facet arthropathy, Lumbosacral    Atherosclerosis of aorta     Cataract     Central retinal vein occlusion of left eye     CKD (chronic kidney disease) stage 3, GFR 30-59 ml/min     Diabetes mellitus, type 2     Diabetic polyneuropathy " 2022    Exotropia of both eyes 2013    recession RSR 5.0mm w/ adj; recession LR os 5.0 w/ adj; resect MR os  4.0mm    Hearing loss     History of resection of small bowel     Hypertensive retinopathy of both eyes     Hypoglycemia     Macular degeneration     OA (osteoarthritis) of shoulder     Right    Osteoporosis     Posterior vitreous detachment of both eyes     Psychiatric problem     Rhinitis     TIA (transient ischemic attack)        Past Surgical History:   Procedure Laterality Date    APPENDECTOMY      CARDIAC CATHETERIZATION      CATARACT EXTRACTION W/  INTRAOCULAR LENS IMPLANT Bilateral      SECTION, CLASSIC      CLOSURE OF LEFT ATRIAL APPENDAGE USING DEVICE N/A 2020    Procedure: Left atrial appendage closure device;  Surgeon: Abundio Curtis MD;  Location: [facility] CATH LAB;  Service: Cardiology;  Laterality: N/A;    HYSTERECTOMY      INNER EAR SURGERY      IRRIGATION AND DEBRIDEMENT OF UPPER EXTREMITY Right 2024    Procedure: IRRIGATION AND DEBRIDEMENT, UPPER EXTREMITY;  Surgeon: Con Moran Jr., MD;  Location: [facility];  Service: Orthopedics;  Laterality: Right;    JOINT REPLACEMENT      LEFT KNEE REPLACEMENT IN  -    OOPHORECTOMY      SINUS SURGERY      STRABISMUS SURGERY  13    RSR recession 5 mm, LLR recession 5 mm and LMR resection 4mm    STRABISMUS SURGERY  2014    recess LR OD 6mm    TONSILLECTOMY      watchman surgery N/A 2020       Family History   Problem Relation Name Age of Onset    Hypertension Mother      Hypertension Father      Liver disease Sister      Diabetes Sister      Heart disease Sister      Diabetes Brother      Breast cancer Maternal Aunt      No Known Problems Maternal Uncle      No Known Problems Paternal Aunt      No Known Problems Paternal Uncle      No Known Problems Maternal Grandmother      No Known Problems Maternal Grandfather      No Known Problems Paternal Grandmother      No Known Problems Paternal Grandfather       No Known Problems Daughter      No Known Problems Son      Heart disease Sister      No Known Problems Son      No Known Problems Son      Cancer Neg Hx          in first degree relatives    Melanoma Neg Hx      Psoriasis Neg Hx      Lupus Neg Hx      Amblyopia Neg Hx      Blindness Neg Hx      Cataracts Neg Hx      Glaucoma Neg Hx      Macular degeneration Neg Hx      Retinal detachment Neg Hx      Strabismus Neg Hx      Stroke Neg Hx      Thyroid disease Neg Hx         Social History     Socioeconomic History    Marital status:    Occupational History    Occupation: retired   Tobacco Use    Smoking status: Former     Current packs/day: 0.00     Types: Cigarettes     Quit date: 10/29/1982     Years since quittin.2     Passive exposure: Never    Smokeless tobacco: Never   Substance and Sexual Activity    Alcohol use: Not Currently     Alcohol/week: 2.0 standard drinks of alcohol     Types: 2 Shots of liquor per week     Comment: rare    Drug use: No    Sexual activity: Yes   Other Topics Concern    Are you pregnant or think you may be? No    Breast-feeding No     Social Drivers of Health     Financial Resource Strain: High Risk (2024)    Overall Financial Resource Strain (CARDIA)     Difficulty of Paying Living Expenses: Hard   Food Insecurity: Food Insecurity Present (2024)    Hunger Vital Sign     Worried About Running Out of Food in the Last Year: Often true     Ran Out of Food in the Last Year: Sometimes true   Transportation Needs: No Transportation Needs (2024)    PRAPARE - Transportation     Lack of Transportation (Medical): No     Lack of Transportation (Non-Medical): No   Physical Activity: Sufficiently Active (2024)    Exercise Vital Sign     Days of Exercise per Week: 7 days     Minutes of Exercise per Session: 40 min   Stress: No Stress Concern Present (2024)    [employer] - Occupational Stress Questionnaire     Feeling of Stress :  "Not at all   Housing Stability: Low Risk  (12/11/2024)    Housing Stability Vital Sign     Unable to Pay for Housing in the Last Year: No     Homeless in the Last Year: No       Current Outpatient Medications   Medication Sig Dispense Refill    aspirin (ECOTRIN) 81 MG EC tablet Take 81 mg by mouth once daily.      furosemide (LASIX) 20 MG tablet Take 2 tablets in the a.m. for weights 155-159, take 2 tablets twice a day for weights 160 over, none for weights less than 155 120 tablet 11    hydrocortisone 2.5 % cream Apply topically 2 (two) times daily as needed (itchy rash). 30 g 1    pravastatin (PRAVACHOL) 40 MG tablet Take 1 tablet (40 mg total) by mouth once daily. 90 tablet 3    propylene glycol (SYSTANE COMPLETE OPHT) Apply to eye.      psyllium 0.52 gram capsule Take 3 capsules by mouth 3 (three) times daily as needed (constipation).      spironolactone (ALDACTONE) 25 MG tablet Take 1 tablet (25 mg total) by mouth 2 (two) times daily. 180 tablet 3     Current Facility-Administered Medications   Medication Dose Route Frequency Provider Last Rate Last Admin    doxycycline tablet 100 mg  100 mg Oral Once            Review of patient's allergies indicates:   Allergen Reactions    Opioids - morphine analogues Other (See Comments)     Bowel issues; bowel obstruction    Tizanidine Other (See Comments)     "Lips were numb,  Almost passed out."    Tramadol Hallucinations    Morphine     Opioids-meperidine and related     Ciprofloxacin Rash       Objective:     Physical Exam   Constitutional:  Non-toxic appearance. She does not appear ill. No distress.   Eyes: Conjunctivae are normal. No scleral icterus.   Cardiovascular: Normal rate, regular rhythm and normal heart sounds.   No murmur heard.Exam reveals no gallop and no friction rub.   Pulmonary/Chest: Effort normal and breath sounds normal. No stridor. No respiratory distress. She has no wheezes. She has no rhonchi. She has no rales.   Abdominal: Normal appearance and " bowel sounds are normal. She exhibits no distension and no mass. Soft. flat abdomen There is abdominal tenderness in the suprapubic area. There is no rebound, no guarding, negative Castellon's sign, no left CVA tenderness and no right CVA tenderness. No hernia.      Comments: History of appendectomy   Neurological: She is alert.   Skin: Skin is warm, dry, not diaphoretic and no rash.   Psychiatric: Her behavior is normal. Mood normal.   Nursing note and vitals reviewed.    Results for orders placed or performed in visit on 02/03/25   POCT Urinalysis(Instrument)    Collection Time: 02/03/25 10:55 AM   Result Value Ref Range    Color, POC UA Yellow Yellow, Straw, Colorless    Clarity, POC UA Cloudy (A) Clear    Glucose, POC UA Negative Negative    Bilirubin, POC UA Negative Negative    Ketones, POC UA Negative Negative    Spec Grav POC UA 1.015 1.005 - 1.030    Blood, POC UA Small (A) Negative    pH, POC UA 7.0 5.0 - 8.0    Protein, POC UA 30 (A) Negative    Urobilinogen, POC UA 0.2 <=1.0    Nitrite, POC UA Negative Negative    WBC, POC UA Large (A) Negative     *Note: Due to a large number of results and/or encounters for the requested time period, some results have not been displayed. A complete set of results can be found in Results Review.       Assessment:     1. Acute UTI        Plan:       Acute UTI  -     POCT Urinalysis(Instrument)  -     Urine Culture High Risk  -     cephALEXin (KEFLEX) 500 MG capsule; Take 1 capsule (500 mg total) by mouth every 12 (twelve) hours. for 7 days  Dispense: 14 capsule; Refill: 0           Results reviewed  I have reviewed the patient chart and pertinent past imaging/labs.  Pa-student zeenat brennan   History of multidrug resistant    Patient Instructions   If your condition worsens or fails to improve we recommend that you receive another evaluation at the ER immediately or contact your PCP to discuss your concerns or return here. You must understand that you've received an  urgent care treatment only and that you may be released before all your medical problems are known or treated. You the patient will arrange for followup care as instructed.     If you had cultures done it will take 3-5 days to result. We will call you with the result.   Take antibiotic as prescribed with food.    Cranberry juice may help. Get the 100% cranberry juice and mix 4 oz of juice with 4 oz of water and drink this 8 oz glass of liquid once a day.   Increase water intake to at least 8-10 glasses/day.      Avoid caffeine, alcohol, or spicy foods as they irritate the bladder.      If symptoms do not improve in 2-3 days please return for evaluation

## 2025-02-03 NOTE — TELEPHONE ENCOUNTER
----- Message from Brent Chapman MD sent at 2/3/2025  9:22 AM CST -----  Regarding: RE: Travel  Contact: [phone]  She can try I will by car.  It would be reasonable for her to wear support stockings at least to the knee.  She should move the legs frequently to prevent blood clots.  She should stopped to walk every 2 hours.  ----- Message -----  From: Melania Simon MA  Sent: 2/3/2025   8:47 AM CST  To: Brent Chapman Jr., MD  Subject: Travel                                           Please advise.  ----- Message -----  From: Elsa Collado  Sent: 2/3/2025   8:42 AM CST  To: Adela Kennedy Staff    .[email or web address]     Patient is calling for Medical Advice regarding:she wants to travel in a car     How long has patient had these symptoms:needs to ask if this is ok     Pharmacy name and phone#:    Patient wants a call back or thru [facility]:call back     Comments:  Pt is stating she may want to leave tomorrow but she needs to make sure it is ok for her to travel by car please advise   Please advise patient replies from provider may take up to 48 hours.

## 2025-02-04 LAB — BACTERIA UR CULT: NO GROWTH

## 2025-02-05 ENCOUNTER — PATIENT MESSAGE (OUTPATIENT)
Dept: URGENT CARE | Facility: CLINIC | Age: OVER 89
End: 2025-02-05
Payer: MEDICARE

## 2025-02-12 NOTE — PLAN OF CARE
Patient in EP PACU s/p SONYA post watchman. Patient is AAOx3, vital signs stable, denies pain. Patient tolerating sips of water. Granddaughter updated on patient's status, states she will pick patient up when time for discharge. Pharmacy notified to have patient's plavix prescription delivered to bedside. Discharge orders placed; patient to be discharged once med delivered.  
Pt arrived to floor ambulatory from home. Pt oriented to room. Iv inserted. Admit assessment completed. Nurse call bell within reach. Pt denies any complaints at this time. Will continue to monitor  
Vs stable awakens easily moves all extrem.alert and oriented  
Parent(s)

## 2025-02-18 ENCOUNTER — TELEPHONE (OUTPATIENT)
Dept: CARDIOLOGY | Facility: CLINIC | Age: OVER 89
End: 2025-02-18
Payer: MEDICARE

## 2025-02-18 NOTE — TELEPHONE ENCOUNTER
----- Message from Alphonse sent at 2/18/2025  9:58 AM CST -----  Patient is calling to schedule appointment. Would like an appointment before the end of March. She can be reached at [phone].Thank you

## 2025-03-08 ENCOUNTER — OFFICE VISIT (OUTPATIENT)
Dept: URGENT CARE | Facility: CLINIC | Age: OVER 89
End: 2025-03-08
Payer: MEDICARE

## 2025-03-08 VITALS
TEMPERATURE: 98 F | SYSTOLIC BLOOD PRESSURE: 124 MMHG | RESPIRATION RATE: 16 BRPM | WEIGHT: 163 LBS | DIASTOLIC BLOOD PRESSURE: 69 MMHG | OXYGEN SATURATION: 98 % | BODY MASS INDEX: 27.16 KG/M2 | HEIGHT: 65 IN | HEART RATE: 73 BPM

## 2025-03-08 DIAGNOSIS — N39.0 RECURRENT UTI: Primary | ICD-10-CM

## 2025-03-08 DIAGNOSIS — R30.0 DYSURIA: ICD-10-CM

## 2025-03-08 DIAGNOSIS — S60.511A ABRASION OF SKIN OF RIGHT HAND: ICD-10-CM

## 2025-03-08 LAB
BILIRUBIN, UA POC OHS: NEGATIVE
BLOOD, UA POC OHS: ABNORMAL
CLARITY, UA POC OHS: ABNORMAL
COLOR, UA POC OHS: YELLOW
GLUCOSE, UA POC OHS: NEGATIVE
KETONES, UA POC OHS: NEGATIVE
LEUKOCYTES, UA POC OHS: ABNORMAL
NITRITE, UA POC OHS: NEGATIVE
PH, UA POC OHS: 5.5
PROTEIN, UA POC OHS: >=300
SPECIFIC GRAVITY, UA POC OHS: 1.02
UROBILINOGEN, UA POC OHS: 0.2

## 2025-03-08 PROCEDURE — 87186 SC STD MICRODIL/AGAR DIL: CPT | Mod: HCNC | Performed by: NURSE PRACTITIONER

## 2025-03-08 PROCEDURE — 87088 URINE BACTERIA CULTURE: CPT | Mod: HCNC | Performed by: NURSE PRACTITIONER

## 2025-03-08 PROCEDURE — 99213 OFFICE O/P EST LOW 20 MIN: CPT | Mod: S$GLB,,, | Performed by: NURSE PRACTITIONER

## 2025-03-08 PROCEDURE — 87086 URINE CULTURE/COLONY COUNT: CPT | Mod: HCNC | Performed by: NURSE PRACTITIONER

## 2025-03-08 PROCEDURE — 81003 URINALYSIS AUTO W/O SCOPE: CPT | Mod: QW,S$GLB,, | Performed by: NURSE PRACTITIONER

## 2025-03-08 RX ORDER — CEPHALEXIN 500 MG/1
500 CAPSULE ORAL EVERY 12 HOURS
Qty: 14 CAPSULE | Refills: 0 | Status: SHIPPED | OUTPATIENT
Start: 2025-03-08 | End: 2025-03-15

## 2025-03-08 RX ORDER — MUPIROCIN 20 MG/G
OINTMENT TOPICAL 3 TIMES DAILY
Qty: 1 G | Refills: 0 | Status: SHIPPED | OUTPATIENT
Start: 2025-03-08

## 2025-03-08 RX ORDER — MUPIROCIN 20 MG/G
OINTMENT TOPICAL
Status: COMPLETED | OUTPATIENT
Start: 2025-03-08 | End: 2025-03-08

## 2025-03-08 RX ADMIN — MUPIROCIN: 20 OINTMENT TOPICAL at 03:03

## 2025-03-08 NOTE — PATIENT INSTRUCTIONS
Wound care completed in clinic  Keep wound clean and dry  Perform wound care once daily  Take antibiotics until completed  Follow up with PCP and Urology    Urine culture was ordered if you have a history of recurrent UTIs, male, pediatrics, and elderly population. You will get a phone call within 3-5 days regarding urine culture results if there any concerns with antibiotic choice. All results will be available on [facility].   If you were prescribed antibiotics, please take them to completion.  If you were prescribed a fluoroquinolone antibiotic such as levofloxacin or ciprofloxacin, avoid heavy lifting for 1-2 weeks after completion of the antibiotic as these antibiotics have a rare complication of tendon rupture. Avoidance of heavy lifting or strenuous exercise reduces this risk.  If you were prescribed a narcotic medication, do not drive or operate heavy equipment or machinery while taking these medications.  Please drink plenty of fluids.  Please get plenty of rest.  If you were prescribed Pyridium (phenazopyridine), please be aware that if you wear contact lens that this medication may stain your contacts.  While taking this medication it is recommended that you do not wear your contacts until 24 hours after your last dose. If it not covered, you can  purchase Azo (phenazopyridine 99 mg) over the counter at drug stores.  If you  smoke, please stop smoking.  If not allergic, take Tylenol (Acetaminophen) 650 mg to  1 g every 6 hours as needed for fever and/or Motrin (Ibuprofen) 600 to 800 mg every 6 hours as needed for fever as directed for control of pain and/or fever      Please remember that you have received care at an urgent care today. Urgent cares are not emergency rooms and are not equipped to handle life threatening emergencies and cannot rule in or out certain medical conditions and you may be released before all of your medical problems are known or treated. Please arrange follow up with your  primary care physician or speciality clinic  within 2-5 days if your signs and symptoms have not resolved or worsen. Patient can call our Referral Hotline at [phone] to make an appointment.    Please return here or go to the Emergency Department for any concerns or worsening of condition.Patient was educated on signs/symptoms that would warrant emergent medical attention.   Signs of infection. These include a fever of 100.4°F (38°C) or higher, chills, back pain, nausea, throwing up, or bloody urine.  Signs come back after treatment ends  You notice more blood in your urine.  Your signs get worse or do not improve within 24 hours of starting treatment.  You are not able to urinate for more than 8 hours.  Your signs come back after treatment has stopped.

## 2025-03-08 NOTE — PROGRESS NOTES
"Subjective:      Patient ID: Jenniffer Jimenez is a 89 y.o. female.    Vitals:  height is 5' 5" (1.651 m) and weight is 73.9 kg (163 lb). Her oral temperature is 98 °F (36.7 °C). Her blood pressure is 124/69 and her pulse is 73. Her respiration is 16 and oxygen saturation is 98%.     Chief Complaint: Urinary Tract Infection    89 y.o. female who presents today with daughter for a chief complaint of burning and urinary frequency x 2 days. She is a current Urology patient and reports frequent UTI's She reports increasing fluid intake with ongoing urinary infections.    She is also concerned about abrasion to her hand that developed as she was getting into the car today. She reports attempting to place a bandage to the area after the incident occurred.      Urinary Tract Infection   This is a new problem. The current episode started in the past 7 days. The problem has been gradually worsening. The quality of the pain is described as burning. The pain is at a severity of 9/10. The pain is severe. There has been no fever. She is Not sexually active. There is No history of pyelonephritis. Associated symptoms include frequency and hesitancy. Pertinent negatives include no behavior changes, chills, discharge, flank pain, hematuria, nausea, possible pregnancy, sweats, urgency, vomiting, weight loss, bubble bath use, constipation, rash or withholding. She has tried nothing for the symptoms. The treatment provided no relief. Her past medical history is significant for recurrent UTIs. There is no history of catheterization, diabetes insipidus, diabetes mellitus, genitourinary reflux, hypertension, kidney stones, a single kidney, STD, urinary stasis or a urological procedure.       Constitution: Negative for activity change, appetite change, chills, sweating, fatigue, fever, unexpected weight change, generalized weakness and international travel in last 60 days.   HENT: Negative.     Neck: neck negative.   Cardiovascular: " Negative.    Eyes: Negative.    Respiratory: Negative.     Gastrointestinal: Negative.  Negative for nausea, vomiting and constipation.   Endocrine: negative.   Genitourinary:  Positive for frequency. Negative for urgency, urine decreased, flank pain, bladder incontinence, bed wetting, hematuria, history of kidney stones, painful menstruation and irregular menstruation.   Musculoskeletal: Negative.    Skin:  Positive for abrasion. Negative for rash and wound.   Allergic/Immunologic: Negative.    Neurological: Negative.    Hematologic/Lymphatic: Negative.    Psychiatric/Behavioral: Negative.        Objective:     Physical Exam   Constitutional: She is oriented to person, place, and time. She appears well-developed.   HENT:   Head: Normocephalic and atraumatic.   Ears:   Right Ear: External ear normal.   Left Ear: External ear normal.   Nose: Nose normal.   Mouth/Throat: Mucous membranes are normal.   Eyes: Conjunctivae and lids are normal.   Neck: Trachea normal. Neck supple.   Cardiovascular: Normal rate, regular rhythm and normal heart sounds.   Pulmonary/Chest: Effort normal and breath sounds normal. No respiratory distress.   Abdominal: Normal appearance and bowel sounds are normal. She exhibits no distension and no mass. Soft. There is no abdominal tenderness.   Musculoskeletal: Normal range of motion.         General: Normal range of motion.   Neurological: She is alert and oriented to person, place, and time. She has normal strength.   Skin: Skin is warm, dry, intact, not diaphoretic and not pale. Abrasions - upper ext.:  hand (left)    Psychiatric: Her speech is normal and behavior is normal. Judgment and thought content normal.   Nursing note and vitals reviewed.      Assessment:     1. Recurrent UTI    2. Dysuria    3. Abrasion of skin of right hand        Plan:       Recurrent UTI  -     POCT Urinalysis(Instrument)  -     cephALEXin (KEFLEX) 500 MG capsule; Take 1 capsule (500 mg total) by mouth every 12  (twelve) hours. for 7 days  Dispense: 14 capsule; Refill: 0  -     Cancel: Urinalysis, Reflex to Urine Culture Urine, Clean Catch  -     Cancel: Urinalysis  -     Urine Culture High Risk    Dysuria  -     Cancel: Urinalysis, Reflex to Urine Culture Urine, Clean Catch  -     Cancel: Urinalysis  -     Urine Culture High Risk    Abrasion of skin of right hand  -     mupirocin 2 % ointment  -     mupirocin (BACTROBAN) 2 % ointment; Apply topically 3 (three) times daily.  Dispense: 1 g; Refill: 0      Results for orders placed or performed in visit on 03/08/25   POCT Urinalysis(Instrument)    Collection Time: 03/08/25  3:19 PM   Result Value Ref Range    Color, POC UA Yellow Yellow, Straw, Colorless    Clarity, POC UA Cloudy (A) Clear    Glucose, POC UA Negative Negative    Bilirubin, POC UA Negative Negative    Ketones, POC UA Negative Negative    Spec Grav POC UA 1.020 1.005 - 1.030    Blood, POC UA Moderate (A) Negative    pH, POC UA 5.5 5.0 - 8.0    Protein, POC UA >=300 (A) Negative    Urobilinogen, POC UA 0.2 <=1.0    Nitrite, POC UA Negative Negative    WBC, POC UA Large (A) Negative     *Note: Due to a large number of results and/or encounters for the requested time period, some results have not been displayed. A complete set of results can be found in Results Review.     Patient Instructions   Wound care completed in clinic  Keep wound clean and dry  Perform wound care once daily  Take antibiotics until completed  Follow up with PCP and Urology    Urine culture was ordered if you have a history of recurrent UTIs, male, pediatrics, and elderly population. You will get a phone call within 3-5 days regarding urine culture results if there any concerns with antibiotic choice. All results will be available on [facility].   If you were prescribed antibiotics, please take them to completion.  If you were prescribed a fluoroquinolone antibiotic such as levofloxacin or ciprofloxacin, avoid heavy lifting for 1-2  weeks after completion of the antibiotic as these antibiotics have a rare complication of tendon rupture. Avoidance of heavy lifting or strenuous exercise reduces this risk.  If you were prescribed a narcotic medication, do not drive or operate heavy equipment or machinery while taking these medications.  Please drink plenty of fluids.  Please get plenty of rest.  If you were prescribed Pyridium (phenazopyridine), please be aware that if you wear contact lens that this medication may stain your contacts.  While taking this medication it is recommended that you do not wear your contacts until 24 hours after your last dose. If it not covered, you can  purchase Azo (phenazopyridine 99 mg) over the counter at drug stores.  If you  smoke, please stop smoking.  If not allergic, take Tylenol (Acetaminophen) 650 mg to  1 g every 6 hours as needed for fever and/or Motrin (Ibuprofen) 600 to 800 mg every 6 hours as needed for fever as directed for control of pain and/or fever      Please remember that you have received care at an urgent care today. Urgent cares are not emergency rooms and are not equipped to handle life threatening emergencies and cannot rule in or out certain medical conditions and you may be released before all of your medical problems are known or treated. Please arrange follow up with your primary care physician or speciality clinic  within 2-5 days if your signs and symptoms have not resolved or worsen. Patient can call our Referral Hotline at [phone] to make an appointment.    Please return here or go to the Emergency Department for any concerns or worsening of condition.Patient was educated on signs/symptoms that would warrant emergent medical attention.   Signs of infection. These include a fever of 100.4°F (38°C) or higher, chills, back pain, nausea, throwing up, or bloody urine.  Signs come back after treatment ends  You notice more blood in your urine.  Your signs get worse or do not improve  within 24 hours of starting treatment.  You are not able to urinate for more than 8 hours.  Your signs come back after treatment has stopped.

## 2025-03-10 ENCOUNTER — RESULTS FOLLOW-UP (OUTPATIENT)
Dept: URGENT CARE | Facility: CLINIC | Age: OVER 89
End: 2025-03-10

## 2025-03-11 ENCOUNTER — PATIENT MESSAGE (OUTPATIENT)
Dept: URGENT CARE | Facility: CLINIC | Age: OVER 89
End: 2025-03-11
Payer: MEDICARE

## 2025-03-11 LAB — BACTERIA UR CULT: ABNORMAL

## 2025-03-18 ENCOUNTER — TELEPHONE (OUTPATIENT)
Dept: CARDIOLOGY | Facility: CLINIC | Age: OVER 89
End: 2025-03-18
Payer: MEDICARE

## 2025-03-18 ENCOUNTER — OFFICE VISIT (OUTPATIENT)
Dept: UROLOGY | Facility: CLINIC | Age: OVER 89
End: 2025-03-18
Payer: MEDICARE

## 2025-03-18 VITALS
SYSTOLIC BLOOD PRESSURE: 147 MMHG | HEART RATE: 74 BPM | DIASTOLIC BLOOD PRESSURE: 65 MMHG | WEIGHT: 162.94 LBS | BODY MASS INDEX: 27.11 KG/M2

## 2025-03-18 DIAGNOSIS — N95.8 GENITOURINARY SYNDROME OF MENOPAUSE: Primary | ICD-10-CM

## 2025-03-18 DIAGNOSIS — Z76.89 ENCOUNTER TO ESTABLISH CARE WITH NEW DOCTOR: ICD-10-CM

## 2025-03-18 PROCEDURE — 99214 OFFICE O/P EST MOD 30 MIN: CPT | Mod: S$GLB,,, | Performed by: UROLOGY

## 2025-03-18 PROCEDURE — 1159F MED LIST DOCD IN RCRD: CPT | Mod: CPTII,S$GLB,, | Performed by: UROLOGY

## 2025-03-18 PROCEDURE — 3288F FALL RISK ASSESSMENT DOCD: CPT | Mod: CPTII,S$GLB,, | Performed by: UROLOGY

## 2025-03-18 PROCEDURE — 81002 URINALYSIS NONAUTO W/O SCOPE: CPT | Mod: S$GLB,,, | Performed by: UROLOGY

## 2025-03-18 PROCEDURE — 1100F PTFALLS ASSESS-DOCD GE2>/YR: CPT | Mod: CPTII,S$GLB,, | Performed by: UROLOGY

## 2025-03-18 PROCEDURE — 99999 PR PBB SHADOW E&M-EST. PATIENT-LVL III: CPT | Mod: PBBFAC,,, | Performed by: UROLOGY

## 2025-03-18 PROCEDURE — G2211 COMPLEX E/M VISIT ADD ON: HCPCS | Mod: S$GLB,,, | Performed by: UROLOGY

## 2025-03-18 PROCEDURE — 1126F AMNT PAIN NOTED NONE PRSNT: CPT | Mod: CPTII,S$GLB,, | Performed by: UROLOGY

## 2025-03-18 RX ORDER — ESTRADIOL 0.1 MG/G
1 CREAM VAGINAL
Qty: 42.5 G | Refills: 3 | Status: SHIPPED | OUTPATIENT
Start: 2025-03-19 | End: 2026-03-19

## 2025-03-18 NOTE — PROGRESS NOTES
History of Present Illness    CHIEF COMPLAINT:  Ms. Jimenez presents today for follow up of recurrent urinary tract infections.    She is accompanied by her daughter in law who is visiting with her son.  They live in [place].      GENITOURINARY/RENAL:  She reports experiencing recurrent urinary tract infections with a pattern of completing prescribed antibiotics for 7-10 days, followed by recurrence approximately one week after completion of treatment. She currently takes Uqora. She experiences urinary incontinence and wears mini pads or liners for protection.    GASTROINTESTINAL:  She reports normal bowel movements, though experienced one episode of fecal incontinence this morning followed by a regular bowel movement.    MEDICAL HISTORY:  She denies personal history of breast cancer.      ROS:  General: -fever, -chills, -fatigue, -weight gain, -weight loss  Eyes: -vision changes, -redness, -discharge  ENT: -ear pain, -nasal congestion, -sore throat  Cardiovascular: -chest pain, -palpitations, -lower extremity edema  Respiratory: -cough, -shortness of breath  Gastrointestinal: -abdominal pain, -nausea, -vomiting, -diarrhea, -constipation, -blood in stool, +bowel incontinence  Genitourinary: -dysuria, -hematuria, -frequency  Musculoskeletal: -joint pain, -muscle pain  Skin: -rash, -lesion  Neurological: -headache, -dizziness, -numbness, -tingling  Psychiatric: -anxiety, -depression, -sleep difficulty       Past Medical History:   Diagnosis Date    Amblyopia of left eye 04/10/2013    Arthritis     Facet arthropathy, Lumbosacral    Atherosclerosis of aorta     Cataract     Central retinal vein occlusion of left eye     CKD (chronic kidney disease) stage 3, GFR 30-59 ml/min     Diabetes mellitus, type 2     Diabetic polyneuropathy 01/06/2022    Exotropia of both eyes 02/06/2013    recession RSR 5.0mm w/ adj; recession LR os 5.0 w/ adj; resect MR os  4.0mm    Hearing loss     History of resection of small bowel      Hypertensive retinopathy of both eyes     Hypoglycemia     Macular degeneration     OA (osteoarthritis) of shoulder     Right    Osteoporosis     Posterior vitreous detachment of both eyes     Psychiatric problem     Rhinitis     TIA (transient ischemic attack)        Past Surgical History:   Procedure Laterality Date    APPENDECTOMY      CARDIAC CATHETERIZATION      CATARACT EXTRACTION W/  INTRAOCULAR LENS IMPLANT Bilateral      SECTION, CLASSIC      CLOSURE OF LEFT ATRIAL APPENDAGE USING DEVICE N/A 2020    Procedure: Left atrial appendage closure device;  Surgeon: Abundio Curtis MD;  Location: [facility] CATH LAB;  Service: Cardiology;  Laterality: N/A;    HYSTERECTOMY      INNER EAR SURGERY      IRRIGATION AND DEBRIDEMENT OF UPPER EXTREMITY Right 2024    Procedure: IRRIGATION AND DEBRIDEMENT, UPPER EXTREMITY;  Surgeon: Con Moran Jr., MD;  Location: [facility] OR;  Service: Orthopedics;  Laterality: Right;    JOINT REPLACEMENT      LEFT KNEE REPLACEMENT IN  -    OOPHORECTOMY      SINUS SURGERY      STRABISMUS SURGERY  13    RSR recession 5 mm, LLR recession 5 mm and LMR resection 4mm    STRABISMUS SURGERY  2014    recess LR OD 6mm    TONSILLECTOMY      watchman surgery N/A 2020       Family History   Problem Relation Name Age of Onset    Hypertension Mother      Hypertension Father      Liver disease Sister      Diabetes Sister      Heart disease Sister      Diabetes Brother      Breast cancer Maternal Aunt      Heart disease Sister       Social History[1]    Allergies:  Opioids - morphine analogues, Tizanidine, Tramadol, Morphine, Opioids-meperidine and related, and Ciprofloxacin    Medications:  Encounter Medications[2]      PHYSICAL EXAMINATION:    The patient generally appears in good health, is appropriately interactive, and is in no apparent distress.    Skin: No lesions.    Mental: Cooperative with normal affect.    Neuro: Grossly intact.    HEENT: Normal. No evidence of  lymphadenopathy.    Chest:  normal inspiratory effort.    Abdomen: Soft, non-tender. No masses or organomegaly. Bladder is not palpable. No evidence of flank discomfort. No evidence of inguinal hernia.    Extremities: No clubbing, cyanosis, or edema    NOTE:  the exam was carried out with a nurse chaperone present  Normal external female genitalia  Grade II urogenital atrophy  Urethral meatus is normal  Urethra and bladder are nontender to bimanual exam  Well supported anteriorly and posteriorly   Uterus and cervix are surgically absent  No adnexal masses    LABS:    Lab Results   Component Value Date    BUN 40 (H) 08/06/2024    CREATININE 1.5 (H) 08/06/2024       UA 1.005, pH 6, otherwise, negative.       Assessment & Plan    IMPRESSION:    URINARY TRACT INFECTION PREVENTION:  - Explained mechanism of vaginal estrogen cream in preventing bladder infections by changing vaginal lining and improving secretions.  - Provided information sheet on preventing bladder infections.  - Discussed importance of adequate water intake for UTI prevention.  - Ms. Jimenez to drink at least 3 16.9-oz bottles of water daily.  - Started Estrace (estradiol) vaginal cream: Use applicator to insert cream vaginally 3 times per week.  - Expect several months for cream to start working effectively.  - If cream costs more than $20, refuse it and inform doctor.  - Contact office if prescribed cream costs more than $20.  - Urinalysis performed during visit.    BOWEL MANAGEMENT:  - Ms. Jimenez to maintain regular bowel movements.    FOLLOW-UP:  - Follow up in 6 months to assess progress.       I spent a total of 30 minutes on the day of the visit.  This includes face to face time and non-face to face time preparing to see the patient (eg, review of tests), obtaining and/or reviewing separately obtained history, documenting clinical information in the electronic or other health record, independently interpreting results and communicating results to  the patient/family/caregiver, or care coordinator.    Visit complexity today is associated with medical care services that are part of the ongoing care related to the single serious and/or complex condition of Recurrent urinary tract infections (Abbie) and Genitourinary syndrome of menopause (GSM). A longitudinal relationship exists or is being developed between the patient and this practitioner for the care of this condition.         This note was generated with the assistance of ambient listening technology. Verbal consent was obtained by the patient and accompanying visitor(s) for the recording of patient appointment to facilitate this note. I attest to having reviewed and edited the generated note for accuracy, though some syntax or spelling errors may persist. Please contact the author of this note for any clarification.             [1]   Social History  Socioeconomic History    Marital status:    Occupational History    Occupation: retired   Tobacco Use    Smoking status: Former     Current packs/day: 0.00     Types: Cigarettes     Quit date: 10/29/1982     Years since quittin.4     Passive exposure: Never    Smokeless tobacco: Never   Substance and Sexual Activity    Alcohol use: Not Currently     Alcohol/week: 2.0 standard drinks of alcohol     Types: 2 Shots of liquor per week     Comment: rare    Drug use: No    Sexual activity: Yes   Other Topics Concern    Are you pregnant or think you may be? No    Breast-feeding No     Social Drivers of Health     Financial Resource Strain: High Risk (2024)    Overall Financial Resource Strain (CARDIA)     Difficulty of Paying Living Expenses: Hard   Food Insecurity: Food Insecurity Present (2024)    Hunger Vital Sign     Worried About Running Out of Food in the Last Year: Often true     Ran Out of Food in the Last Year: Sometimes true   Transportation Needs: No Transportation Needs (2024)    PRAPARE - Transportation     Lack of  Transportation (Medical): No     Lack of Transportation (Non-Medical): No   Physical Activity: Sufficiently Active (2024)    Exercise Vital Sign     Days of Exercise per Week: 7 days     Minutes of Exercise per Session: 40 min   Stress: No Stress Concern Present (2024)    [employer] - Occupational Stress Questionnaire     Feeling of Stress : Not at all   Housing Stability: Low Risk  (2024)    Housing Stability Vital Sign     Unable to Pay for Housing in the Last Year: No     Number of Times Moved in the Last Year: 1     Homeless in the Last Year: No   [2]   Outpatient Encounter Medications as of 3/18/2025   Medication Sig Dispense Refill    aspirin (ECOTRIN) 81 MG EC tablet Take 81 mg by mouth once daily.      [] cephALEXin (KEFLEX) 500 MG capsule Take 1 capsule (500 mg total) by mouth every 12 (twelve) hours. for 7 days 14 capsule 0    [START ON 3/19/2025] estradioL (ESTRACE) 0.01 % (0.1 mg/gram) vaginal cream Place 1 g vaginally 3 (three) times a week. 42.5 g 3    furosemide (LASIX) 20 MG tablet Take 2 tablets in the a.m. for weights 155-159, take 2 tablets twice a day for weights 160 over, none for weights less than 155 120 tablet 11    hydrocortisone 2.5 % cream Apply topically 2 (two) times daily as needed (itchy rash). 30 g 1    mupirocin (BACTROBAN) 2 % ointment Apply topically 3 (three) times daily. 1 g 0    pravastatin (PRAVACHOL) 40 MG tablet Take 1 tablet (40 mg total) by mouth once daily. 90 tablet 3    propylene glycol (SYSTANE COMPLETE OPHT) Apply to eye.      psyllium 0.52 gram capsule Take 3 capsules by mouth 3 (three) times daily as needed (constipation).      spironolactone (ALDACTONE) 25 MG tablet Take 1 tablet (25 mg total) by mouth 2 (two) times daily. 180 tablet 3     Facility-Administered Encounter Medications as of 3/18/2025   Medication Dose Route Frequency Provider Last Rate Last Admin    [DISCONTINUED] doxycycline tablet 100 mg  100 mg  Oral Once

## 2025-03-18 NOTE — TELEPHONE ENCOUNTER
----- Message from Mendy sent at 3/18/2025  3:44 PM CDT -----  Regarding: Transportation Form  Good afternoon, This patient states she has a form that needs to be filled out for her to continue getting public handicap transportation. She asks if Dr. Reed Ruiz (PCP) can help her with that, or if Dr. Brent Chapman can fill out the form for her at her appointment tomorrow?Please call her to advise: [phone]; She says her form expires in 2 weeks.Thank Ming

## 2025-03-19 ENCOUNTER — OFFICE VISIT (OUTPATIENT)
Dept: CARDIOLOGY | Facility: CLINIC | Age: OVER 89
End: 2025-03-19
Payer: MEDICARE

## 2025-03-19 VITALS
SYSTOLIC BLOOD PRESSURE: 130 MMHG | DIASTOLIC BLOOD PRESSURE: 49 MMHG | HEART RATE: 66 BPM | BODY MASS INDEX: 27.37 KG/M2 | WEIGHT: 164.44 LBS

## 2025-03-19 DIAGNOSIS — I27.22 PULMONARY HYPERTENSION DUE TO LEFT VENTRICULAR DIASTOLIC DYSFUNCTION: ICD-10-CM

## 2025-03-19 DIAGNOSIS — I13.10 HYPERTENSIVE HEART AND KIDNEY DISEASE WITHOUT HEART FAILURE AND WITH STAGE 4 CHRONIC KIDNEY DISEASE: ICD-10-CM

## 2025-03-19 DIAGNOSIS — I10 PRIMARY HYPERTENSION: Primary | Chronic | ICD-10-CM

## 2025-03-19 DIAGNOSIS — N18.4 HYPERTENSIVE HEART AND KIDNEY DISEASE WITHOUT HEART FAILURE AND WITH STAGE 4 CHRONIC KIDNEY DISEASE: ICD-10-CM

## 2025-03-19 DIAGNOSIS — Z29.89 INDICATION PRESENT FOR ENDOCARDITIS PROPHYLAXIS: ICD-10-CM

## 2025-03-19 DIAGNOSIS — I48.20 CHRONIC A-FIB: ICD-10-CM

## 2025-03-19 DIAGNOSIS — I50.32 CHRONIC HEART FAILURE WITH PRESERVED EJECTION FRACTION: Chronic | ICD-10-CM

## 2025-03-19 PROCEDURE — 99999 PR PBB SHADOW E&M-EST. PATIENT-LVL III: CPT | Mod: PBBFAC,,, | Performed by: INTERNAL MEDICINE

## 2025-03-19 NOTE — PROGRESS NOTES
Subjective:   03/19/2025     Patient ID:  Jenniffer Jimenez is a 89 y.o. female who presents for evaulation of Follow-up and Congestive Heart Failure      Patient doing well with no chest pains tightness, PND or orthopnea.  She does have mild lower extremity edema.  Takes Lasix as noted in the medication list adjusted by weight.  Has not wanted to try SG LT 2 inhibitor therapy in the past.  Has frequent urinary tract infections    Has chronic atrial fibrillation, no anticoagulation post Watchman.  Continue aspirin.            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   Recently hospitalized with a cat bite infection.  Doing well from the heart standpoint.  Blood pressures have been normal at home.    Has chronic atrial fibrillation, rate controlled, no anticoagulation post Watchman.  She is status placement of a left atrial appendage occluder 2021, November.  Follow up in [facility], note made that she should take SBE prophylaxis for life, she has not been doing that.    She has previous had diastolic heart failure, shortness of breath not increasing, she was on spironolactone, caused hyperkalemia , but now back on spironolactone and tolerating well..  She had been placed on SG LT 2 inhibitor therapy in the past, had not wish to take though.  Worried about side effects.    Assessment and Plan:    Chronic heart failure with preserved ejection fraction  Comments:  continue volume management  Did not wish to try SG LT 2 inhibitor therapy unless problems would occur with current therapy  Orders:  -     Echo; Future; Expected date: 08/15/2024    Chronic a-fib  Comments:  accepted as rhythm of choice    Presence of Watchman left atrial appendage closure device  Comments:  endocarditis prophylaxis required    Primary hypertension  Comments:  generally better controlled, check blood pressures at home    Pure hypercholesterolemia  Comments:  on moderate dose statin therapy    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        Echocardiogram 4/23:   · The left ventricle is normal in size with concentric hypertrophy and normal systolic function.  · The estimated ejection fraction is 55%.  · Indeterminate left ventricular diastolic function.  · With normal right ventricular systolic function.  · Severe left atrial enlargement.  · Severe right atrial enlargement.  · Mild mitral regurgitation.  · Mild to moderate tricuspid regurgitation.  · There is pulmonary hypertension.  · The estimated PA systolic pressure is 56 mmHg.  · Elevated central venous pressure (15 mmHg).       Prior note:   She comes in for cardiac follow-up.  Lots of problems recently with lower extremity cellulitis.  Hospitalized for that, developed delirium.  Subsequent to our last visit, she developed dehydration was seen in the emergency room.  Her weight has increased since then from 149 lb to 155 lb.  She is not had chest pains or tightness    She has not had increasing chest pains or tightness, shortness of breath, PND or orthopnea.    Comes in  today for heart failure follow-up.  I would put her on Jardiance last visit, she never took it, worried about side effects.  She had been off of spironolactone also, review of the chart shows 1 episode of hyperkalemia, now on the low side.      Her swelling has been stable, wounds of the lower extremity are healing, no exertional chest pains tightness or shortness of breath, no PND orthopnea.    Hypercholesterolemia is on present, on moderate dose statin therapy.       She does have chronic atrial fibrillation is status post Watchman device as noted above.  Her heart rate is well controlled.    Prior note:    She presented for evaluation of chest pain in February of 2021.  She had noted increasing swelling in her legs.  She had taken additional furosemide.  She is status post Watchman device placement in December of 2020.  She is off anticoagulation, now on aspirin only.  She has not had bleeding problems.  She does not have a history of chest pain.  Cardiac catheterization performed many years ago was normal.  Echocardiography continue show evidence for diastolic dysfunction with increased PA pressures and increased left atrial size.  She has not had increasing shortness of breath.  Her weight has been stable, taking additional furosemide and aldosterone is needed.  Laboratory work recently has shown stable renal to and potassium.          She was felt to have acute on chronic chronic diastolic heart failure.  My recommendations then were:  1.  Discontinue potassium chloride  2.  Take furosemide 20 mg twice a day to get rid of fluid, decrease to once a day when fluid improved.  3.  Take spironolactone 25 mg 1 with each furosemide.  4.  Call me if you have recurrent chest pain.  5.  Weigh yourself daily.  Record this and bring it in with your next visit     I saw her 2 weeks after the initial presentation.  She was markedly improved.  Recommendations then were:    1.  Decrease Lasix/furosemide and  Aldactone/spironolactone to 1 a day as long as weight stays below 156 lb, okay to increase to twice a day for increased weight.  2.  Please do a blood test for me today to check kidney function.    She developed an episode of lightheadedness and her weight target was changed to 160lb.      Echocardiogram from January 2022:  · The left ventricle is normal in size with normal systolic function.  · The estimated ejection fraction is 65%.  · Severe left atrial enlargement.  · Grade III left ventricular diastolic dysfunction.  · The estimated PA systolic pressure is 51 mmHg.  · Normal right ventricular size with normal right ventricular systolic function.  · There is mild pulmonary hypertension.  · Intermediate central venous pressure (8 mmHg).  · Moderate right atrial enlargement.  · Moderate tricuspid regurgitation.  · Mild mitral regurgitation.       Recent carotid ultrasound shows mild bilateral disease:  There is 20-39% right Internal Carotid Stenosis.  There is 40-49% left Internal Carotid Stenosis.      Congestive Heart Failure  Pertinent negatives include no chest pain, claudication, near-syncope or palpitations.   Hypertension  Associated symptoms include headaches. Pertinent negatives include no chest pain, orthopnea, palpitations or PND.   Hyperlipidemia  Pertinent negatives include no chest pain.   Follow-up  Associated symptoms include headaches and numbness. Pertinent negatives include no chest pain.       Patient Active Problem List   Diagnosis    Pure hypercholesterolemia    Pseudophakia, both eyes    Stable central retinal vein occlusion of left eye    Chronic rhinitis    Hearing loss, sensorineural    Primary hypertension    Arthritis    Exotropia of both eyes    Debility    Gait instability    Meniere's disease    Facet arthropathy, lumbosacral    Lumbar spinal stenosis    Hypermetropia of right eye    Chronic a-fib    Stage 3b chronic kidney disease    Atherosclerosis of aorta    History of TIA  (transient ischemic attack)    Osteoporosis    Primary osteoarthritis of right shoulder    History of strabismus surgery    Encounter for long-term (current) use of antiplatelets/antithrombotics    Prediabetes    Refractive error    Posterior vitreous detachment, bilateral    Dry eye syndrome    Overweight (BMI 25.0-29.9)    Risk for falls    OAB (overactive bladder)    Venous stasis dermatitis    BRVO (branch retinal vein occlusion)    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 eye with active choroidal neovascularization    Hypertensive retinopathy of both eyes    Unspecified inflammatory spondylopathy, lumbosacral region    Presence of Watchman left atrial appendage closure device    Normocytic anemia    Cervicogenic headache    Senile purpura    Skin tear of forearm without complication, left, initial encounter    Tear of left supraspinatus tendon    Weakness of shoulder    Impaired function of upper extremity    Normocytic normochromic anemia    Open wound of left lower extremity    Unable to care for self    Hyperkalemia    Trochanteric bursitis of right hip    Intertrigo    Generalized skin eruption due to medication taken internally    High anion gap metabolic acidosis    Delirium due to medical condition with behavioral disturbance    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    Polyneuropathy in diseases classified elsewhere    Microcytic anemia    Heart failure with preserved ejection fraction    Uses walker    Cat bite    Infected cat bite of hand, right, sequela    Pancytopenia    Decreased range of motion    Decreased muscle strength    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    Indication present for endocarditis prophylaxis        Review of patient's allergies indicates:   Allergen Reactions    Opioids - morphine analogues Other (See Comments)     Bowel issues; bowel obstruction    Tizanidine Other (See  "Comments)     "Lips were numb,  Almost passed out."    Tramadol Hallucinations    Morphine     Opioids-meperidine and related     Ciprofloxacin Rash         Current Outpatient Medications:     aspirin (ECOTRIN) 81 MG EC tablet, Take 81 mg by mouth once daily., Disp: , Rfl:     estradioL (ESTRACE) 0.01 % (0.1 mg/gram) vaginal cream, Place 1 g vaginally 3 (three) times a week., Disp: 42.5 g, Rfl: 3    furosemide (LASIX) 20 MG tablet, Take 2 tablets in the a.m. for weights 155-159, take 2 tablets twice a day for weights 160 over, none for weights less than 155, Disp: 120 tablet, Rfl: 11    hydrocortisone 2.5 % cream, Apply topically 2 (two) times daily as needed (itchy rash)., Disp: 30 g, Rfl: 1    mupirocin (BACTROBAN) 2 % ointment, Apply topically 3 (three) times daily., Disp: 1 g, Rfl: 0    pravastatin (PRAVACHOL) 40 MG tablet, Take 1 tablet (40 mg total) by mouth once daily., Disp: 90 tablet, Rfl: 3    propylene glycol (SYSTANE COMPLETE OPHT), Apply to eye., Disp: , Rfl:     psyllium 0.52 gram capsule, Take 3 capsules by mouth 3 (three) times daily as needed (constipation)., Disp: , Rfl:     spironolactone (ALDACTONE) 25 MG tablet, Take 1 tablet (25 mg total) by mouth 2 (two) times daily., Disp: 180 tablet, Rfl: 3     Objective:   Review of Systems   Cardiovascular:  Positive for leg swelling. Negative for chest pain, claudication, cyanosis, dyspnea on exertion, irregular heartbeat, near-syncope, orthopnea, palpitations, paroxysmal nocturnal dyspnea and syncope.   Musculoskeletal:  Positive for arthritis and muscle cramps.   Gastrointestinal:  Positive for constipation and diarrhea.   Neurological:  Positive for headaches, loss of balance, numbness and paresthesias.       Vitals:    03/19/25 1040   BP: (!) 130/49   Pulse: 66               Physical Exam  Vitals reviewed.   Constitutional:       General: She is not in acute distress.     Appearance: She is well-developed.   HENT:      Head: Normocephalic and " atraumatic.      Nose: Nose normal.   Eyes:      Conjunctiva/sclera: Conjunctivae normal.      Pupils: Pupils are equal, round, and reactive to light.   Neck:      Vascular: Hepatojugular reflux and JVD present. No carotid bruit.   Cardiovascular:      Rate and Rhythm: Normal rate and regular rhythm.      Pulses: Intact distal pulses.      Heart sounds: Normal heart sounds. No murmur heard.     No friction rub. No gallop.      Comments: Jugular venous pressure is normal.  No edema  Pulmonary:      Effort: Pulmonary effort is normal. No respiratory distress.      Breath sounds: Normal breath sounds. No wheezing or rales.   Chest:      Chest wall: No tenderness.   Abdominal:      General: Bowel sounds are normal. There is no distension.      Palpations: Abdomen is soft.      Tenderness: There is no abdominal tenderness.   Musculoskeletal:         General: No tenderness or deformity. Normal range of motion.      Cervical back: Normal range of motion and neck supple.      Right lower leg: Edema (Trace) present.      Left lower leg: Edema (trace) present.      Comments: Both lower extremities below the knee wrapped   Skin:     General: Skin is warm and dry.      Findings: No erythema or rash.   Neurological:      Mental Status: She is alert and oriented to person, place, and time.      Cranial Nerves: No cranial nerve deficit.      Motor: No abnormal muscle tone.      Coordination: Coordination normal.   Psychiatric:         Behavior: Behavior normal.         Thought Content: Thought content normal.         Judgment: Judgment normal.          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

## 2025-03-24 ENCOUNTER — PATIENT MESSAGE (OUTPATIENT)
Dept: INTERNAL MEDICINE | Facility: CLINIC | Age: OVER 89
End: 2025-03-24
Payer: MEDICARE

## 2025-05-09 DIAGNOSIS — I10 ESSENTIAL HYPERTENSION: Chronic | ICD-10-CM

## 2025-05-09 DIAGNOSIS — I50.32 CHRONIC DIASTOLIC HEART FAILURE: ICD-10-CM

## 2025-05-09 RX ORDER — SPIRONOLACTONE 25 MG/1
25 TABLET ORAL 2 TIMES DAILY
Qty: 180 TABLET | Refills: 3 | Status: SHIPPED | OUTPATIENT
Start: 2025-05-09 | End: 2026-05-10

## 2025-05-23 ENCOUNTER — TELEPHONE (OUTPATIENT)
Dept: INTERNAL MEDICINE | Facility: CLINIC | Age: OVER 89
End: 2025-05-23
Payer: MEDICARE

## 2025-05-26 ENCOUNTER — LAB VISIT (OUTPATIENT)
Dept: LAB | Facility: HOSPITAL | Age: OVER 89
End: 2025-05-26
Attending: INTERNAL MEDICINE
Payer: MEDICARE

## 2025-05-26 ENCOUNTER — OFFICE VISIT (OUTPATIENT)
Dept: INTERNAL MEDICINE | Facility: CLINIC | Age: OVER 89
End: 2025-05-26
Payer: MEDICARE

## 2025-05-26 VITALS
WEIGHT: 165.38 LBS | HEART RATE: 75 BPM | HEIGHT: 65 IN | BODY MASS INDEX: 27.56 KG/M2 | TEMPERATURE: 97 F | OXYGEN SATURATION: 98 % | DIASTOLIC BLOOD PRESSURE: 58 MMHG | SYSTOLIC BLOOD PRESSURE: 138 MMHG

## 2025-05-26 DIAGNOSIS — I48.20 CHRONIC A-FIB: Chronic | ICD-10-CM

## 2025-05-26 DIAGNOSIS — R26.81 GAIT INSTABILITY: ICD-10-CM

## 2025-05-26 DIAGNOSIS — Z76.89 ENCOUNTER TO ESTABLISH CARE WITH NEW DOCTOR: ICD-10-CM

## 2025-05-26 DIAGNOSIS — H35.3221 EXUDATIVE AGE-RELATED MACULAR DEGENERATION OF LEFT EYE WITH ACTIVE CHOROIDAL NEOVASCULARIZATION: ICD-10-CM

## 2025-05-26 DIAGNOSIS — Z00.00 ANNUAL PHYSICAL EXAM: Primary | ICD-10-CM

## 2025-05-26 DIAGNOSIS — I50.32 CHRONIC HEART FAILURE WITH PRESERVED EJECTION FRACTION: Chronic | ICD-10-CM

## 2025-05-26 DIAGNOSIS — H35.033 HYPERTENSIVE RETINOPATHY OF BOTH EYES: Chronic | ICD-10-CM

## 2025-05-26 DIAGNOSIS — I10 PRIMARY HYPERTENSION: Chronic | ICD-10-CM

## 2025-05-26 DIAGNOSIS — R53.1 WEAKNESS: ICD-10-CM

## 2025-05-26 DIAGNOSIS — N95.8 GENITOURINARY SYNDROME OF MENOPAUSE: ICD-10-CM

## 2025-05-26 DIAGNOSIS — M25.551 RIGHT HIP PAIN: ICD-10-CM

## 2025-05-26 DIAGNOSIS — Z95.818 PRESENCE OF WATCHMAN LEFT ATRIAL APPENDAGE CLOSURE DEVICE: Chronic | ICD-10-CM

## 2025-05-26 DIAGNOSIS — H34.8122 STABLE CENTRAL RETINAL VEIN OCCLUSION OF LEFT EYE: ICD-10-CM

## 2025-05-26 DIAGNOSIS — I10 PRIMARY HYPERTENSION: ICD-10-CM

## 2025-05-26 DIAGNOSIS — I70.0 ATHEROSCLEROSIS OF AORTA: Chronic | ICD-10-CM

## 2025-05-26 DIAGNOSIS — E78.00 PURE HYPERCHOLESTEROLEMIA: Chronic | ICD-10-CM

## 2025-05-26 DIAGNOSIS — R25.1 TREMOR: ICD-10-CM

## 2025-05-26 DIAGNOSIS — M81.6 LOCALIZED OSTEOPOROSIS, UNSPECIFIED PATHOLOGICAL FRACTURE PRESENCE: Chronic | ICD-10-CM

## 2025-05-26 PROBLEM — D61.818 PANCYTOPENIA: Status: RESOLVED | Noted: 2024-02-21 | Resolved: 2025-05-26

## 2025-05-26 LAB
ABSOLUTE EOSINOPHIL (OHS): 0.02 K/UL
ABSOLUTE MONOCYTE (OHS): 0.43 K/UL (ref 0.3–1)
ABSOLUTE NEUTROPHIL COUNT (OHS): 3.38 K/UL (ref 1.8–7.7)
ALBUMIN SERPL BCP-MCNC: 4.2 G/DL (ref 3.5–5.2)
ALP SERPL-CCNC: 93 UNIT/L (ref 40–150)
ALT SERPL W/O P-5'-P-CCNC: 15 UNIT/L (ref 10–44)
ANION GAP (OHS): 13 MMOL/L (ref 8–16)
AST SERPL-CCNC: 18 UNIT/L (ref 11–45)
BASOPHILS # BLD AUTO: 0.02 K/UL
BASOPHILS NFR BLD AUTO: 0.5 %
BILIRUB SERPL-MCNC: 0.7 MG/DL (ref 0.1–1)
BUN SERPL-MCNC: 71 MG/DL (ref 10–30)
CALCIUM SERPL-MCNC: 9.1 MG/DL (ref 8.7–10.5)
CHLORIDE SERPL-SCNC: 101 MMOL/L (ref 95–110)
CHOLEST SERPL-MCNC: 158 MG/DL (ref 120–199)
CHOLEST/HDLC SERPL: 2.5 {RATIO} (ref 2–5)
CO2 SERPL-SCNC: 22 MMOL/L (ref 23–29)
CREAT SERPL-MCNC: 2.1 MG/DL (ref 0.5–1.4)
ERYTHROCYTE [DISTWIDTH] IN BLOOD BY AUTOMATED COUNT: 16.2 % (ref 11.5–14.5)
GFR SERPLBLD CREATININE-BSD FMLA CKD-EPI: 22 ML/MIN/1.73/M2
GLUCOSE SERPL-MCNC: 108 MG/DL (ref 70–110)
HCT VFR BLD AUTO: 31.2 % (ref 37–48.5)
HDLC SERPL-MCNC: 62 MG/DL (ref 40–75)
HDLC SERPL: 39.2 % (ref 20–50)
HGB BLD-MCNC: 10.2 GM/DL (ref 12–16)
IMM GRANULOCYTES # BLD AUTO: 0.06 K/UL (ref 0–0.04)
IMM GRANULOCYTES NFR BLD AUTO: 1.4 % (ref 0–0.5)
LDLC SERPL CALC-MCNC: 84.8 MG/DL (ref 63–159)
LYMPHOCYTES # BLD AUTO: 0.53 K/UL (ref 1–4.8)
MCH RBC QN AUTO: 29.4 PG (ref 27–31)
MCHC RBC AUTO-ENTMCNC: 32.7 G/DL (ref 32–36)
MCV RBC AUTO: 90 FL (ref 82–98)
NONHDLC SERPL-MCNC: 96 MG/DL
NUCLEATED RBC (/100WBC) (OHS): 0 /100 WBC
PLATELET # BLD AUTO: 162 K/UL (ref 150–450)
PMV BLD AUTO: 11.4 FL (ref 9.2–12.9)
POTASSIUM SERPL-SCNC: 5.5 MMOL/L (ref 3.5–5.1)
PROT SERPL-MCNC: 7.1 GM/DL (ref 6–8.4)
RBC # BLD AUTO: 3.47 M/UL (ref 4–5.4)
RELATIVE EOSINOPHIL (OHS): 0.5 %
RELATIVE LYMPHOCYTE (OHS): 11.9 % (ref 18–48)
RELATIVE MONOCYTE (OHS): 9.7 % (ref 4–15)
RELATIVE NEUTROPHIL (OHS): 76 % (ref 38–73)
SODIUM SERPL-SCNC: 136 MMOL/L (ref 136–145)
TRIGL SERPL-MCNC: 56 MG/DL (ref 30–150)
TSH SERPL-ACNC: 1.91 UIU/ML (ref 0.4–4)
WBC # BLD AUTO: 4.44 K/UL (ref 3.9–12.7)

## 2025-05-26 PROCEDURE — 1101F PT FALLS ASSESS-DOCD LE1/YR: CPT | Mod: CPTII,HCNC,S$GLB, | Performed by: INTERNAL MEDICINE

## 2025-05-26 PROCEDURE — 99213 OFFICE O/P EST LOW 20 MIN: CPT | Mod: HCNC,25,S$GLB, | Performed by: INTERNAL MEDICINE

## 2025-05-26 PROCEDURE — 84443 ASSAY THYROID STIM HORMONE: CPT | Mod: HCNC

## 2025-05-26 PROCEDURE — 80053 COMPREHEN METABOLIC PANEL: CPT | Mod: HCNC

## 2025-05-26 PROCEDURE — 3288F FALL RISK ASSESSMENT DOCD: CPT | Mod: CPTII,HCNC,S$GLB, | Performed by: INTERNAL MEDICINE

## 2025-05-26 PROCEDURE — 36415 COLL VENOUS BLD VENIPUNCTURE: CPT | Mod: HCNC,PO

## 2025-05-26 PROCEDURE — 99397 PER PM REEVAL EST PAT 65+ YR: CPT | Mod: HCNC,S$GLB,, | Performed by: INTERNAL MEDICINE

## 2025-05-26 PROCEDURE — 1160F RVW MEDS BY RX/DR IN RCRD: CPT | Mod: CPTII,HCNC,S$GLB, | Performed by: INTERNAL MEDICINE

## 2025-05-26 PROCEDURE — 80061 LIPID PANEL: CPT | Mod: HCNC

## 2025-05-26 PROCEDURE — 85025 COMPLETE CBC W/AUTO DIFF WBC: CPT | Mod: HCNC

## 2025-05-26 PROCEDURE — 1159F MED LIST DOCD IN RCRD: CPT | Mod: CPTII,HCNC,S$GLB, | Performed by: INTERNAL MEDICINE

## 2025-05-26 PROCEDURE — 1125F AMNT PAIN NOTED PAIN PRSNT: CPT | Mod: CPTII,HCNC,S$GLB, | Performed by: INTERNAL MEDICINE

## 2025-05-26 PROCEDURE — 99999 PR PBB SHADOW E&M-EST. PATIENT-LVL V: CPT | Mod: PBBFAC,HCNC,, | Performed by: INTERNAL MEDICINE

## 2025-05-26 RX ORDER — ESTRADIOL 0.1 MG/G
1 CREAM VAGINAL
Qty: 42.5 G | Refills: 3 | Status: SHIPPED | OUTPATIENT
Start: 2025-05-26 | End: 2026-05-26

## 2025-05-26 NOTE — PROGRESS NOTES
Assessment:       1. Annual physical exam  - Ambulatory referral/consult to Optometry; Future    2. Primary hypertension  - CBC Auto Differential; Future  - Comprehensive Metabolic Panel; Future  - TSH; Future  - Lipid Panel; Future    3. Hypertensive retinopathy of both eyes    4. Pure hypercholesterolemia    5. Atherosclerosis of aorta    6. Chronic a-fib    7. Presence of Watchman left atrial appendage closure device    8. Chronic heart failure with preserved ejection fraction    9. Localized osteoporosis, unspecified pathological fracture presence    10. Stable central retinal vein occlusion of left eye    11. 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 eye with active choroidal neovascularization    12. Encounter to establish care with new doctor  - Ambulatory referral/consult to Internal Medicine    13. Right hip pain    14. Tremor  - Ambulatory referral/consult to Neurology; Future    15. Genitourinary syndrome of menopause  - estradioL (ESTRACE) 0.01 % (0.1 mg/gram) vaginal cream; Place 1 g vaginally 3 (three) times a week.  Dispense: 42.5 g; Refill: 3    16. Weakness  - Ambulatory Referral/Consult to Physical Therapy; Future    17. Gait instability  - Ambulatory Referral/Consult to Physical Therapy; Future        Plan:       1. Check CBC, CMP, TSH, lipids.  Discussed diet and exercise.  Discussed vaccines.  Refer to Optometry.  2/3.  Continue Aldactone 25 mg   4/5.  Continue pravastatin 40 mg   6/7.  Continue aspirin 81 mg.  8. Continue Lasix 20 mg   9.  Monitor   10/11.  Continue to follow with Optometry.  12. Not pertinent.  Cannot remove.    13. Refer to physical therapy   14.  Refer to Neurology   15.  Estrace prescribed.    16/17.  Refer to physical therapy.    Deep Scribe:  IMPRESSION:  1. Assessed HTN, HLD, a-fib, heart failure, and osteoporosis.  2. Evaluated recent weight gain, likely due to increased sodium intake.  3. Assessed reported shaking and hip pain.  4. Evaluated reports of  lightheadedness, likely due to fluid shifts rather than glucose issues.  5. Considered vaginal atrophy as contributing factor to recurrent UTIs and explained relationship between the two.  6. Discussed importance of proper hydration and its impact on lightheadedness, including need for balance between fluid intake and heart condition management.    SUMMARY:   Prescribed vaginal cream for vaginal atrophy and recurrent UTIs management   Continue Tylenol 500 mg 4 times daily for pain management   Continue Lasix 20 mg with instructions to adjust dosage based on weight changes   Ordered labs   Referred to physical therapy for improving mobility, strength, and fall prevention   Assist in scheduling an appointment with a neurologist for evaluation of abnormal head movements   Follow up to reassess hip pain   Referral to physical therapy to improve mobility and overall lifestyle    STABLE CENTRAL RETINAL VEIN OCCLUSION OF LEFT EYE:   Monitored the patient's stable central retinal vein occlusion of the left eye.    EXUDATIVE AGE-RELATED MACULAR DEGENERATION OF LEFT EYE WITH ACTIVE CHOROIDAL NEOVASCULARIZATION:   Monitored the patient's exudative macular degeneration currently followed by optometry.    ## ABNORMAL HEAD MOVEMENTS:   Ms. Jimenez has been experiencing progressive shaking symptoms for approximately 6 months.   Ms. Jimenez canceled previous neurology appointment due to personal reasons but desires neurological evaluation.   Will assist in scheduling an appointment with a neurologist for comprehensive evaluation of these abnormal head movements.    ## DIZZINESS AND LIGHTHEADEDNESS:   Lightheadedness likely attributed to fluid shifts and dehydration.   Ms. Jimenez currently takes sugar pills when feeling lightheaded.   Discussed how fluid shifts can cause lightheadedness and recommended increasing water intake.   Advised to monitor weight to better manage fluid balance.    ## FALL RISK AND MOBILITY ISSUES:   Ms. Jimenez  experienced a fall in December resulting in an emergency department visit, where a neurologist referral was made.   Ms. Jimenez uses a walker for fall prevention and is very compliant with this recommendation.   Referred to physical therapy to improve mobility, prevent future falls, and potentially reduce dependence on the walker.    ## HIP PAIN:   Ms. Jimenez reports throbbing, periodic hip pain present for approximately 1 month.   Currently taking Tylenol 500 mg 4 times daily with good pain relief.   Physical exam of the hip performed during visit.   Advised to continue Tylenol for pain management.   Referred to physical therapy for improving mobility and strength.   Will follow up to reassess hip pain.    ## URINARY TRACT INFECTIONS:   Ms. Jimenez has recurrent urinary tract infections and has increased water intake as a self-management strategy.   Diagnosed vaginal atrophy as a contributing factor to recurrent UTIs.   Discussed importance of vaginal rejuvenation and prescribed vaginal cream to address the underlying vaginal atrophy and help manage recurrent UTIs.    ## FLUID OVERLOAD:   Fluid overload likely due to excessive sodium intake from consuming crawfish.   Ms. Jimenez monitors weight daily and reports weight fluctuations related to sodium intake.   Instructed to continue daily weight monitoring and to reduce fluid intake if weight in  creases by 3 lbs in 1 day or 5 lbs in 3 days.   Advised to watch for swelling in feet and ankles as indicators of excessive fluid retention.   Continued Lasix 20 mg with instructions to adjust dosage based on weight changes and fluid retention.    ## LIFESTYLE AND GENERAL HEALTH:   Ms. Jimenez reports rare alcohol consumption and denies smoking or drug use.   Follows a diet from [employer] and consumes fruit regularly.   Discussed lifestyle choices including dietary habits and exercise routines.   Referred to physical therapy to improve mobility and overall lifestyle.    Ordered labs.                 This note was generated with the assistance of ambient listening technology. Verbal consent was obtained by the patient and accompanying visitor(s) for the recording of patient appointment to facilitate this note. I attest to having reviewed and edited the generated note for accuracy, though some syntax or spelling errors may persist. Please contact the author of this note for any clarification.       Subjective:       Patient ID: Jenniffer Jimenez is a 89 y.o. female.    Chief Complaint: Annual Exam    HPI    89 y.o. female here for annual exam.     Cholesterol: needs  Vaccines: Influenza - 2024; Tetanus - 2024; Prevnar 20 - 13 and 23 done; Zoster - 2 done; COVID - 7 done  Eye exam: due  Mammogram:  No longer indicated (patient agrees)  Gyn exam: No longer indicated (patient agrees)  Colonoscopy: No longer indicated (patient agrees)  DEXA:  2023, osteoporosis - not on medication - does not want fosamax or boniva.     Exercise: was doing bike, but not as much lately since her hip started to hurt.  Diet: meals on wheels for lunch. No breakfast on occasion.  Fruit for dinner.    Patient has hypertension and hypertensive retinopathy of both eyes on Aldactone 25 mg.    Patient has hyperlipidemia and atherosclerosis of the aorta on pravastatin 40 mg.    Patient has chronic atrial fibrillation on aspirin 81 mg and has the presence of Watchman.    Patient has chronic heart failure with preserved ejection fraction on Lasix 20 mg.    Patient has osteoporosis on no current medication.    Patient has stable central retinal vein occlusion of the left eye and exudative macular degeneration followed by Optometry.    History of Present Illness    CHIEF COMPLAINT:  89-year-old female presents today for follow-up.    NEUROLOGICAL SYMPTOMS:  She reports experiencing tremors for approximately 6 months with progressive worsening. She experiences episodes of lightheadedness, which she manages with an  unspecified generic medication. She had a fall incident requiring an emergency room visit while moving quickly within her apartment complex, leading to a neurologist referral.    MOBILITY AND PAIN:  She requires a walker for all ambulation and is compliant with its use. She reports hip pain for about a month, described as throbbing and periodic in nature. She takes Tylenol 500mg 4 times daily with improvement. Due to hip pain, she discontinued her previous exercise routine of 30-60 minutes daily on a stationary bike.    WEIGHT MANAGEMENT:  She monitors her weight daily. She experienced a temporary weight gain approximately one week ago after consuming crawfish, associated with feeling unwell, which she attributes to excessive sodium intake. She typically monitors her sodium intake carefully but acknowledges occasional lapses.    GENITOURINARY:  She reports experiencing recurrent urinary tract infections.    SOCIAL HISTORY:  She reports rare alcohol consumption, approximately every six months. She prefers liquor but avoids regular consumption due to concerns about balance issues and potential misinterpretation of falls.      ROS:  Constitutional: +lightheadedness, +change in weight  Musculoskeletal: +limb pain  Neurological: +tremors, +trembling, +balance issues         Review of Systems          Objective:      Physical Exam  Vitals reviewed.   Constitutional:       Appearance: She is well-developed.   HENT:      Head: Normocephalic and atraumatic.      Mouth/Throat:      Pharynx: No oropharyngeal exudate.   Eyes:      General: No scleral icterus.        Right eye: No discharge.         Left eye: No discharge.      Pupils: Pupils are equal, round, and reactive to light.   Neck:      Thyroid: No thyromegaly.      Trachea: No tracheal deviation.   Cardiovascular:      Rate and Rhythm: Normal rate and regular rhythm.      Heart sounds: Normal heart sounds. No murmur heard.     No friction rub. No gallop.   Pulmonary:       Effort: Pulmonary effort is normal. No respiratory distress.      Breath sounds: Normal breath sounds. No wheezing or rales.   Chest:      Chest wall: No tenderness.   Abdominal:      General: Bowel sounds are normal. There is no distension.      Palpations: Abdomen is soft. There is no mass.      Tenderness: There is no abdominal tenderness. There is no guarding or rebound.   Musculoskeletal:         General: No tenderness. Normal range of motion.      Cervical back: Normal range of motion and neck supple.   Skin:     General: Skin is warm and dry.      Coloration: Skin is not pale.      Findings: No erythema or rash.   Neurological:      Mental Status: She is alert and oriented to person, place, and time.   Psychiatric:         Behavior: Behavior normal.

## 2025-05-27 ENCOUNTER — RESULTS FOLLOW-UP (OUTPATIENT)
Dept: INTERNAL MEDICINE | Facility: CLINIC | Age: OVER 89
End: 2025-05-27

## 2025-06-09 ENCOUNTER — TELEPHONE (OUTPATIENT)
Dept: INTERNAL MEDICINE | Facility: CLINIC | Age: OVER 89
End: 2025-06-09
Payer: MEDICARE

## 2025-06-09 ENCOUNTER — OCHSNER VIRTUAL EMERGENCY DEPARTMENT (OUTPATIENT)
Facility: CLINIC | Age: OVER 89
End: 2025-06-09
Payer: MEDICARE

## 2025-06-09 ENCOUNTER — NURSE TRIAGE (OUTPATIENT)
Dept: ADMINISTRATIVE | Facility: CLINIC | Age: OVER 89
End: 2025-06-09
Payer: MEDICARE

## 2025-06-09 NOTE — TELEPHONE ENCOUNTER
Copied from CRM #[id number]. Topic: General Inquiry - Patient Advice  >> Jun 6, 2025  3:07 PM Nidia wrote:  .1MEDICALADVICE     Patient is calling for Medical Advice regarding:Good Afternoon, Patient need help to get handicap transportation . Patient need application completed so she can go to the store and go to Dr appointment .  Patient asked for a call . Patient need us to let call ring due to her walking with a walker.    How long has patient had these symptoms:    Pharmacy name and phone#:    Patient wants a call back or thru [facility]:call     Comments:    Please advise patient replies from provider may take up to 48 hours.

## 2025-06-10 NOTE — TELEPHONE ENCOUNTER
Pt has been having hip pain, and she has been taking Tylenol Extra  strength 500 mg 4 times a day and she is wondering if she can use the Equate NSAID cream to rub on her hip as well as using the Tylenol? Dispo- Call Doctor within 24 hours.Reached out to [facility], Dr. Sarabjit Man and he advised, nsaid cream is fine just follow directions. Pt aware and VU. Advised to call back with any further concerns or questions.               Reason for Disposition   [1] Caller has medicine question about med NOT prescribed by primary care doctor (or NP/PA) or specialist AND [2] triager unable to answer question (e.g., compatibility with other med, storage)    Additional Information   Negative: [1] Intentional drug overdose AND [2] suicidal thoughts or ideas   Negative: MORE THAN A DOUBLE DOSE of a prescription or over-the-counter (OTC) drug   Negative: [1] DOUBLE DOSE (an extra dose or lesser amount) of prescription drug AND [2] any symptoms (e.g., dizziness, nausea, pain, sleepiness)   Negative: [1] DOUBLE DOSE (an extra dose or lesser amount) of over-the-counter (OTC) drug AND [2] any symptoms (e.g., dizziness, nausea, pain, sleepiness)   Negative: Took another person's prescription drug   Negative: [1] DOUBLE DOSE (an extra dose or lesser amount) of prescription drug AND [2] NO symptoms  (Exception: A double dose of antibiotics.)   Negative: Diabetes drug error or overdose (e.g., took wrong type of insulin or took extra dose)   Negative: [1] Prescription not at pharmacy AND [2] was prescribed by doctor (or NP/PA) recently  (Exception: Triager has access to EMR and prescription is recorded there. Go to Home Care and confirm for pharmacy.)   Negative: [1] Pharmacy calling with prescription question AND [2] triager unable to answer question   Negative: [1] Caller has URGENT medicine question about med that primary care doctor (or NP/PA) or specialist prescribed AND [2] triager unable to answer question   Negative: Medicine patch  causing local rash or itching    Protocols used: Medication Question Call-A-AH

## 2025-06-10 NOTE — TELEPHONE ENCOUNTER
Pt called Nurse Triage to verify it was okay to use Equate NSAID crm to rub on hip in addition to taking Tylenol Extra Strength 500mg qid. Anton - Dr. Sarabjit Man, verified ok to use as directed on package. Pt VU and was instructed to call back if have further questions.

## 2025-06-10 NOTE — PLAN OF CARE-OVED
[facility] Emergency Department Plan of Care Note  Referral Source: Nurse On-Call                               Chief Complaint   Patient presents with    Hip Pain     Has been using tylenol for it for some time, wants to know if equate nsaid cream is ok       Recommendation: Treat in place                       Recommendation comment: ok to use topical nsaid

## 2025-06-16 NOTE — ED NOTES
Suturing completed per PA - dressing applied per PA.    PRE-OP DIAGNOSIS:  Colon cancer 16-Jun-2025 17:13:19  Sudarshan Ramesh

## 2025-06-20 ENCOUNTER — HOSPITAL ENCOUNTER (EMERGENCY)
Facility: HOSPITAL | Age: OVER 89
Discharge: HOME | End: 2025-06-20
Attending: EMERGENCY MEDICINE
Payer: MEDICARE

## 2025-06-20 VITALS
WEIGHT: 165 LBS | OXYGEN SATURATION: 99 % | BODY MASS INDEX: 27.46 KG/M2 | TEMPERATURE: 98 F | DIASTOLIC BLOOD PRESSURE: 61 MMHG | RESPIRATION RATE: 16 BRPM | HEART RATE: 65 BPM | SYSTOLIC BLOOD PRESSURE: 143 MMHG

## 2025-06-20 DIAGNOSIS — M25.559 HIP PAIN: ICD-10-CM

## 2025-06-20 DIAGNOSIS — N30.00 ACUTE CYSTITIS WITHOUT HEMATURIA: Primary | ICD-10-CM

## 2025-06-20 LAB
BACTERIA #/AREA URNS AUTO: ABNORMAL /HPF
BILIRUB UR QL STRIP.AUTO: NEGATIVE
CLARITY UR: ABNORMAL
COLOR UR AUTO: YELLOW
GLUCOSE UR QL STRIP: NEGATIVE
HGB UR QL STRIP: ABNORMAL
HOLD SPECIMEN: NORMAL
KETONES UR QL STRIP: NEGATIVE
LEUKOCYTE ESTERASE UR QL STRIP: ABNORMAL
MICROSCOPIC COMMENT: ABNORMAL
NITRITE UR QL STRIP: NEGATIVE
PH UR STRIP: 6 [PH]
PROT UR QL STRIP: NEGATIVE
RBC #/AREA URNS AUTO: 2 /HPF (ref 0–4)
SP GR UR STRIP: 1.01
SQUAMOUS #/AREA URNS AUTO: <1 /HPF
UROBILINOGEN UR STRIP-ACNC: NEGATIVE EU/DL
WBC #/AREA URNS AUTO: >100 /HPF (ref 0–5)
WBC CLUMPS UR QL AUTO: ABNORMAL

## 2025-06-20 PROCEDURE — 99285 EMERGENCY DEPT VISIT HI MDM: CPT | Mod: 25,HCNC

## 2025-06-20 PROCEDURE — 81001 URINALYSIS AUTO W/SCOPE: CPT | Mod: HCNC | Performed by: STUDENT IN AN ORGANIZED HEALTH CARE EDUCATION/TRAINING PROGRAM

## 2025-06-20 PROCEDURE — 87077 CULTURE AEROBIC IDENTIFY: CPT | Mod: HCNC | Performed by: STUDENT IN AN ORGANIZED HEALTH CARE EDUCATION/TRAINING PROGRAM

## 2025-06-20 RX ORDER — CEPHALEXIN 500 MG/1
500 CAPSULE ORAL EVERY 12 HOURS
Qty: 10 CAPSULE | Refills: 0 | Status: SHIPPED | OUTPATIENT
Start: 2025-06-20 | End: 2025-06-25

## 2025-06-20 NOTE — ED TRIAGE NOTES
"Jenniffer Jimenez, a 89 y.o. female presents to the ED w/ complaint of Hip/leg pain.    Triage note:  Chief Complaint   Patient presents with    Hip Pain     Pt reports right hip pain x2 days. Pt denies injury or recent unusual activity. Pt also reports burning with urination.     Review of patient's allergies indicates:   Allergen Reactions    Opioids - morphine analogues Other (See Comments)     Bowel issues; bowel obstruction    Tizanidine Other (See Comments)     "Lips were numb,  Almost passed out."    Tramadol Hallucinations    Morphine     Opioids-meperidine and related     Ciprofloxacin Rash     Past Medical History:   Diagnosis Date    Amblyopia of left eye 04/10/2013    Arthritis     Facet arthropathy, Lumbosacral    Atherosclerosis of aorta     Cataract     Central retinal vein occlusion of left eye     CKD (chronic kidney disease) stage 3, GFR 30-59 ml/min     Diabetes mellitus, type 2     Diabetic polyneuropathy 01/06/2022    Exotropia of both eyes 02/06/2013    recession RSR 5.0mm w/ adj; recession LR os 5.0 w/ adj; resect MR os  4.0mm    Hearing loss     History of resection of small bowel     Hypertensive retinopathy of both eyes     Hypoglycemia     Macular degeneration     OA (osteoarthritis) of shoulder     Right    Osteoporosis     Posterior vitreous detachment of both eyes     Psychiatric problem     Rhinitis     TIA (transient ischemic attack)        "

## 2025-06-20 NOTE — ED PROVIDER NOTES
"Encounter Date: 6/20/2025       History     Chief Complaint   Patient presents with    Hip Pain     Pt reports right hip pain x2 days. Pt denies injury or recent unusual activity. Pt also reports burning with urination.     Patient is a 89-year-old female with past medical history of hypertension, hyperlipidemia, CKD and diabetes that presents to emergency department with leg pain.  Patient states that she was walking on a treadmill when she had sudden onset right hip pain.  Ever since the right hip pain began she ever wants all has a small spasm of the left leg.  States that she has chronic pain, however has not worsened in the past 2 days.  Patient is still is able to ambulate with walker and has no numbness or tingling.  Denies urinary incontinence or fecal incontinence.        Review of patient's allergies indicates:   Allergen Reactions    Opioids - morphine analogues Other (See Comments)     Bowel issues; bowel obstruction    Tizanidine Other (See Comments)     "Lips were numb,  Almost passed out."    Tramadol Hallucinations    Morphine     Opioids-meperidine and related     Ciprofloxacin Rash     Past Medical History:   Diagnosis Date    Amblyopia of left eye 04/10/2013    Arthritis     Facet arthropathy, Lumbosacral    Atherosclerosis of aorta     Cataract     Central retinal vein occlusion of left eye     CKD (chronic kidney disease) stage 3, GFR 30-59 ml/min     Diabetes mellitus, type 2     Diabetic polyneuropathy 01/06/2022    Exotropia of both eyes 02/06/2013    recession RSR 5.0mm w/ adj; recession LR os 5.0 w/ adj; resect MR os  4.0mm    Hearing loss     History of resection of small bowel     Hypertensive retinopathy of both eyes     Hypoglycemia     Macular degeneration     OA (osteoarthritis) of shoulder     Right    Osteoporosis     Posterior vitreous detachment of both eyes     Psychiatric problem     Rhinitis     TIA (transient ischemic attack)      Past Surgical History:   Procedure Laterality " Date    APPENDECTOMY      CARDIAC CATHETERIZATION      CATARACT EXTRACTION W/  INTRAOCULAR LENS IMPLANT Bilateral      SECTION, CLASSIC      CLOSURE OF LEFT ATRIAL APPENDAGE USING DEVICE N/A 2020    Procedure: Left atrial appendage closure device;  Surgeon: Abundio Curtis MD;  Location: [facility] CATH LAB;  Service: Cardiology;  Laterality: N/A;    HYSTERECTOMY      INNER EAR SURGERY      IRRIGATION AND DEBRIDEMENT OF UPPER EXTREMITY Right 2024    Procedure: IRRIGATION AND DEBRIDEMENT, UPPER EXTREMITY;  Surgeon: Con Moran Jr., MD;  Location: [facility] OR;  Service: Orthopedics;  Laterality: Right;    JOINT REPLACEMENT      LEFT KNEE REPLACEMENT IN 2013 -    OOPHORECTOMY      SINUS SURGERY      STRABISMUS SURGERY  13    RSR recession 5 mm, LLR recession 5 mm and LMR resection 4mm    STRABISMUS SURGERY  2014    recess LR OD 6mm    TONSILLECTOMY      watchman surgery N/A 2020     Family History   Problem Relation Name Age of Onset    Hypertension Mother      Hypertension Father      Liver disease Sister      Diabetes Sister      Heart disease Sister      Diabetes Brother      Breast cancer Maternal Aunt      No Known Problems Maternal Uncle      No Known Problems Paternal Aunt      No Known Problems Paternal Uncle      No Known Problems Maternal Grandmother      No Known Problems Maternal Grandfather      No Known Problems Paternal Grandmother      No Known Problems Paternal Grandfather      No Known Problems Daughter      No Known Problems Son      Heart disease Sister      No Known Problems Son      No Known Problems Son      Cancer Neg Hx          in first degree relatives    Melanoma Neg Hx      Psoriasis Neg Hx      Lupus Neg Hx      Amblyopia Neg Hx      Blindness Neg Hx      Cataracts Neg Hx      Glaucoma Neg Hx      Macular degeneration Neg Hx      Retinal detachment Neg Hx      Strabismus Neg Hx      Stroke Neg Hx      Thyroid disease Neg Hx       Social History[1]  Review of  Systems  ROS negative except as noted in HPI     Physical Exam     Initial Vitals [06/20/25 1220]   BP Pulse Resp Temp SpO2   (!) 124/56 68 18 98.6 °F (37 °C) 97 %      MAP       --         Physical Exam    Nursing note and vitals reviewed.  Constitutional: No distress.   HENT:   Head: Normocephalic.   Nose: Nose normal. Mouth/Throat: Oropharynx is clear and moist.   Eyes: Conjunctivae are normal.   Cardiovascular:  Normal rate.           Pulmonary/Chest: Breath sounds normal. No respiratory distress.   Abdominal: Abdomen is soft. She exhibits no distension. There is no abdominal tenderness. There is no rebound.   Genitourinary:    Genitourinary Comments: Good rectal tone     Musculoskeletal:         General: Tenderness (Right hip) present. No edema.     Neurological: She is alert and oriented to person, place, and time. She has normal strength. No cranial nerve deficit or sensory deficit. GCS score is 15. GCS eye subscore is 4. GCS verbal subscore is 5. GCS motor subscore is 6.   Skin: Skin is warm. Capillary refill takes less than 2 seconds.   Psychiatric: She has a normal mood and affect.         ED Course   Procedures  Labs Reviewed - No data to display       Imaging Results              CT Lumbar Spine Without Contrast (In process)                      X-Ray Pelvis Routine AP (Final result)  Result time 06/20/25 14:31:26      Final result by Brandon Grimm III, MD (06/20/25 14:31:26)                   Narrative:    EXAMINATION:  XR PELVIS ROUTINE AP    CLINICAL HISTORY:  Pain in unspecified hip    FINDINGS:  Pelvis AP one view.    There is baseline DJD.  No fracture dislocation bone destruction seen.  No acute trauma seen.      Electronically signed by: Brandon Grimm MD  Date:    06/20/2025  Time:    14:31                                     Medications - No data to display  Medical Decision Making  Patient is a 89-year-old female with past medical history of hypertension, hyperlipidemia, CKD and diabetes  that presents to emergency department with leg pain.     On initial evaluation patient's vitals are within normal limits and she is cooperative with the history and physical exam.    She is speaking in full sentences.    Differential for patient's presentation includes MSK injury related to right hip pain, compression fracture of lumbar spine, considered pathologies such as cauda equina however patient has no saddle anesthesia, good rectal tone and no history of urinary or fecal incontinence.  Low suspicion at this time for cauda equina.  Patient has good range of motion and was ambulatory prior to my evaluation.    At time of sign-out patient pending CT scan and likely discharge home.  Final disposition will be assessed by oncoming ER team.        Amount and/or Complexity of Data Reviewed  Radiology: ordered. Decision-making details documented in ED Course.               ED Course as of 25 1535      1438 X-Ray Pelvis Routine AP  Per radiology: There is baseline DJD.  No fracture dislocation bone destruction seen.  No acute trauma seen. [TK]      ED Course User Index  [TK] Osiris Mayorga DO                           Clinical Impression:  Final diagnoses:  [M25.559] Hip pain  [M25.559] Hip pain - right hip                       [1]   Social History  Tobacco Use    Smoking status: Former     Current packs/day: 0.00     Types: Cigarettes     Quit date: 10/29/1982     Years since quittin.6     Passive exposure: Never    Smokeless tobacco: Never   Substance Use Topics    Alcohol use: Yes     Alcohol/week: 2.0 standard drinks of alcohol     Types: 2 Shots of liquor per week     Comment: less than once every 6 months    Drug use: No        Osiris Mayorga DO  Resident  25 1535

## 2025-06-20 NOTE — DISCHARGE INSTRUCTIONS
You were seen in the emergency department for you were hip pain.  It does not appear that you have any broken bones by you do appear to have low bone mass.  We recommend that he follow up with your primary care doctor for further management.  You mentioned that you are having some discomfort with urination and it does appear that you have a UTI.  We are discharging you with antibiotics.  Please follow up with your primary care doctor in the next week to make sure that your symptoms are improving.    Please return to the emergency department if you develop uncontrolled pain, loss of sensation in your legs, loss of control of your bowel or bladder, weakness, difficulty walking, fever/chills, uncontrolled vomiting, or any new concern.

## 2025-06-20 NOTE — ED NOTES
I-STAT Chem-8+ Results:   Value Reference Range   Sodium 127 136-145 mmol/L   Potassium  5.3 3.5-5.1 mmol/L   Chloride 94  mmol/L   Ionized Calcium 1.16 1.06-1.42 mmol/L   CO2 (measured) 21 23-29 mmol/L   Glucose 87  mg/dL   BUN 56 6-30 mg/dL   Creatinine 1.9 0.5-1.4 mg/dL   Hematocrit 30 36-54%

## 2025-06-20 NOTE — ED NOTES
Assumed care for pt after recieving report from EDITH Wren. Pt. resting in bed. Pt. offered bathroom assistance and denies need at this time. Explanation of care/wait provided. Pt verbalizes no needs at this time. Bed in low, locked position with rails up and call bell in reach. Pt's white board updated with today's care team and plan.

## 2025-06-20 NOTE — PROVIDER PROGRESS NOTES - EMERGENCY DEPT.
Encounter Date: 6/20/2025    ED Physician Progress Notes          ED Wright of Care Note  4:14 PM 6/20/2025  Jenniffer Jimenez is a 89 y.o. female who presented to the ED on 6/20/2025 and has been managed by , who reports patient C/O hip pain. I assumed care of patient from off-going ED physician team at 3:14 PM pending CT l spine.    On my evaluation, Jenniffer Jimenez appears well and is hemodynamically stable. Thus far, Jenniffer Jimenez has received:  Medications - No data to display    On my exam, I appreciate:  BP (!) 142/66   Pulse 70   Temp 98.6 °F (37 °C) (Oral)   Resp 18   Wt 74.8 kg (165 lb)   LMP  (LMP Unknown)   SpO2 98%   Breastfeeding No   BMI 27.46 kg/m²     ED Course as of 06/20/25 1816   Fri Jun 20, 2025   1438 X-Ray Pelvis Routine AP  Per radiology: There is baseline DJD.  No fracture dislocation bone destruction seen.  No acute trauma seen. [TK]      ED Course User Index  [TK] Osiris Mayorga,         Additional ED course:  CT lumbar spine: No evidence of acute fracture or traumatic malalignment of the lumbar spine. Diffuse osteopenia.   UA with prominent leuks. Given symptoms will treat.  Istat w/ stable Cr. Will discharge home with 5d of keflex with PCP follow up. Return precautions provided.    Disposition: discharge  I have discussed and counseled Jenniffer Jimenez regarding exam, results, diagnosis, treatment, and plan.  ______________________  Fadumo Nelson MD   Emergency Medicine Physician  6/20/2025

## 2025-06-21 ENCOUNTER — PATIENT OUTREACH (OUTPATIENT)
Facility: OTHER | Age: OVER 89
End: 2025-06-21
Payer: MEDICARE

## 2025-06-22 ENCOUNTER — RESULTS FOLLOW-UP (OUTPATIENT)
Dept: EMERGENCY MEDICINE | Facility: HOSPITAL | Age: OVER 89
End: 2025-06-22

## 2025-06-22 NOTE — ASSESSMENT & PLAN NOTE
Patient with Paroxysmal (<7 days) atrial fibrillation which is uncontrolled. Patient is currently in Afib with RVR.  [id number] Score: 4.   Anticoagulation not indicated due to s/p Watchman device implant.  She is not on any AV juanito blockers currently.    ASA 81mg po qday                     No

## 2025-06-23 LAB
BACTERIA UR CULT: ABNORMAL
BUN SERPL-MCNC: 56 MG/DL (ref 6–30)
CHLORIDE SERPL-SCNC: 94 MMOL/L (ref 95–110)
CREAT SERPL-MCNC: 1.9 MG/DL (ref 0.5–1.4)
GLUCOSE SERPL-MCNC: 87 MG/DL (ref 70–110)
HCT VFR BLD CALC: 30 %PCV (ref 36–54)
POC IONIZED CALCIUM: 1.16 MMOL/L (ref 1.06–1.42)
POC TCO2 (MEASURED): 21 MMOL/L (ref 23–29)
POTASSIUM BLD-SCNC: 5.3 MMOL/L (ref 3.5–5.1)
SAMPLE: ABNORMAL
SODIUM BLD-SCNC: 127 MMOL/L (ref 136–145)

## 2025-06-27 ENCOUNTER — TELEPHONE (OUTPATIENT)
Dept: PHARMACY | Facility: CLINIC | Age: OVER 89
End: 2025-06-27
Payer: MEDICARE

## 2025-06-28 NOTE — TELEPHONE ENCOUNTER
[facility]/Population Health Chart Review & Patient Outreach Details For Medication Adherence Project    Reason for Outreach Encounter: 3rd Party payor non-compliance report ([employer], [employer], [employer], etc)  2.  Patient Outreach Method: Reviewed patient chart   3.   Medication in question:    Hyperlipidemia Medications              pravastatin (PRAVACHOL) 40 MG tablet Take 1 tablet (40 mg total) by mouth once daily.                  LF 90 ds 6/4/25    4.  Reviewed and or Updates Made To: Patient Chart  5. Outreach Outcomes and/or actions taken: Patient filled medication and is on track to be adherent  Additional Notes:

## 2025-07-15 NOTE — ASSESSMENT & PLAN NOTE
Apply a thin layer of Mupirocin Ointment to the scalp lesion, 3 times daily for 7 days    Try to discourage Charanjit from picking at this lesion    A referral to Dermatology was provided for Charanjit's scalp-lesion, below are some practices to contact for an eval (see if they take your insurance and can see Charanjit in a relatively reasonable timeframe):    Dermatology Groups in the [facility]:    [facility] - [phone]  [facility]:  [phone]  [facility]:  [phone]  [facility]:  [phone]  [facility]:  [phone]  [facility]:  [phone]     Patient's FSGs are controlled on current medication regimen.  Last A1c reviewed-   Lab Results   Component Value Date    HGBA1C 6.0 (H) 09/20/2022     Most recent fingerstick glucose reviewed-   No results for input(s): POCTGLUCOSE in the last 24 hours.  Current correctional scale  Low  Maintain anti-hyperglycemic dose as follows-   Antihyperglycemics (From admission, onward)    None        Hold Oral hypoglycemics while patient is in the hospital.  -Accuchecks AC/HS

## 2025-07-17 ENCOUNTER — HOSPITAL ENCOUNTER (INPATIENT)
Facility: HOSPITAL | Age: OVER 89
LOS: 4 days | Discharge: HOME HEALTH | DRG: 291 | End: 2025-07-22
Attending: EMERGENCY MEDICINE | Admitting: HOSPITALIST
Payer: MEDICARE

## 2025-07-17 DIAGNOSIS — R25.1 TREMOR: ICD-10-CM

## 2025-07-17 DIAGNOSIS — R68.89 IMPAIRED FUNCTION OF UPPER EXTREMITY: ICD-10-CM
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
## 2025-07-17 DIAGNOSIS — R07.9 CHEST PAIN, UNSPECIFIED TYPE: ICD-10-CM

## 2025-07-17 DIAGNOSIS — N17.9 AKI (ACUTE KIDNEY INJURY): ICD-10-CM

## 2025-07-17 DIAGNOSIS — I50.31 ACUTE HEART FAILURE WITH PRESERVED EJECTION FRACTION: Chronic | ICD-10-CM

## 2025-07-17 DIAGNOSIS — R07.9 CHEST PAIN: ICD-10-CM

## 2025-07-17 DIAGNOSIS — R29.6 FALLS: ICD-10-CM

## 2025-07-17 DIAGNOSIS — R06.02 SHORTNESS OF BREATH: ICD-10-CM

## 2025-07-17 DIAGNOSIS — Z99.89 USES ROLLER WALKER: ICD-10-CM

## 2025-07-17 DIAGNOSIS — R29.6 FREQUENT FALLS: ICD-10-CM

## 2025-07-17 DIAGNOSIS — Z78.9 DECREASED ACTIVITIES OF DAILY LIVING (ADL): ICD-10-CM

## 2025-07-17 DIAGNOSIS — E87.5 HYPERKALEMIA: ICD-10-CM

## 2025-07-17 DIAGNOSIS — I50.9 ACUTE CONGESTIVE HEART FAILURE, UNSPECIFIED HEART FAILURE TYPE: ICD-10-CM

## 2025-07-17 PROBLEM — E87.1 HYPONATREMIA: Status: ACTIVE | Noted: 2025-07-17

## 2025-07-17 PROBLEM — R26.2 AMBULATORY DYSFUNCTION: Status: ACTIVE | Noted: 2025-07-17

## 2025-07-17 LAB
ABSOLUTE EOSINOPHIL (OHS): 0.01 K/UL
ABSOLUTE MONOCYTE (OHS): 0.42 K/UL (ref 0.3–1)
ABSOLUTE NEUTROPHIL COUNT (OHS): 3.16 K/UL (ref 1.8–7.7)
ALBUMIN SERPL BCP-MCNC: 4.2 G/DL (ref 3.5–5.2)
ALP SERPL-CCNC: 96 UNIT/L (ref 40–150)
ALT SERPL W/O P-5'-P-CCNC: 15 UNIT/L (ref 10–44)
ANION GAP (OHS): 10 MMOL/L (ref 8–16)
ANION GAP (OHS): 9 MMOL/L (ref 8–16)
APTT PPP: 27.5 SECONDS (ref 21–32)
AST SERPL-CCNC: 18 UNIT/L (ref 11–45)
BACTERIA #/AREA URNS AUTO: NORMAL /HPF
BASOPHILS # BLD AUTO: 0.01 K/UL
BASOPHILS NFR BLD AUTO: 0.3 %
BILIRUB SERPL-MCNC: 0.7 MG/DL (ref 0.1–1)
BILIRUB UR QL STRIP.AUTO: NEGATIVE
BNP SERPL-MCNC: 273 PG/ML (ref 0–99)
BUN SERPL-MCNC: 53 MG/DL (ref 10–30)
BUN SERPL-MCNC: 53 MG/DL (ref 10–30)
CALCIUM SERPL-MCNC: 8.5 MG/DL (ref 8.7–10.5)
CALCIUM SERPL-MCNC: 8.7 MG/DL (ref 8.7–10.5)
CHLORIDE SERPL-SCNC: 98 MMOL/L (ref 95–110)
CHLORIDE SERPL-SCNC: 99 MMOL/L (ref 95–110)
CLARITY UR: CLEAR
CO2 SERPL-SCNC: 19 MMOL/L (ref 23–29)
CO2 SERPL-SCNC: 20 MMOL/L (ref 23–29)
COLOR UR AUTO: YELLOW
CREAT SERPL-MCNC: 1.8 MG/DL (ref 0.5–1.4)
CREAT SERPL-MCNC: 1.9 MG/DL (ref 0.5–1.4)
ERYTHROCYTE [DISTWIDTH] IN BLOOD BY AUTOMATED COUNT: 16.7 % (ref 11.5–14.5)
GFR SERPLBLD CREATININE-BSD FMLA CKD-EPI: 24 ML/MIN/1.73/M2
GFR SERPLBLD CREATININE-BSD FMLA CKD-EPI: 26 ML/MIN/1.73/M2
GLUCOSE SERPL-MCNC: 121 MG/DL (ref 70–110)
GLUCOSE SERPL-MCNC: 70 MG/DL (ref 70–110)
GLUCOSE UR QL STRIP: NEGATIVE
HCT VFR BLD AUTO: 26.7 % (ref 37–48.5)
HGB BLD-MCNC: 9 GM/DL (ref 12–16)
HGB UR QL STRIP: NEGATIVE
HYALINE CASTS UR QL AUTO: 1 /LPF (ref 0–1)
IMM GRANULOCYTES # BLD AUTO: 0.02 K/UL (ref 0–0.04)
IMM GRANULOCYTES NFR BLD AUTO: 0.5 % (ref 0–0.5)
INR PPP: 1 (ref 0.8–1.2)
KETONES UR QL STRIP: NEGATIVE
LEUKOCYTE ESTERASE UR QL STRIP: ABNORMAL
LYMPHOCYTES # BLD AUTO: 0.37 K/UL (ref 1–4.8)
MAGNESIUM SERPL-MCNC: 2 MG/DL (ref 1.6–2.6)
MCH RBC QN AUTO: 29.4 PG (ref 27–31)
MCHC RBC AUTO-ENTMCNC: 33.7 G/DL (ref 32–36)
MCV RBC AUTO: 87 FL (ref 82–98)
MICROSCOPIC COMMENT: NORMAL
NITRITE UR QL STRIP: NEGATIVE
NUCLEATED RBC (/100WBC) (OHS): 0 /100 WBC
OSMOLALITY SERPL: 285 MOSM/KG (ref 275–295)
OSMOLALITY UR: 281 MOSM/KG (ref 50–1200)
PH UR STRIP: 5 [PH]
PLATELET # BLD AUTO: 118 K/UL (ref 150–450)
PMV BLD AUTO: 10.3 FL (ref 9.2–12.9)
POTASSIUM SERPL-SCNC: 5 MMOL/L (ref 3.5–5.1)
POTASSIUM SERPL-SCNC: 5.6 MMOL/L (ref 3.5–5.1)
PROT SERPL-MCNC: 6.8 GM/DL (ref 6–8.4)
PROT UR QL STRIP: NEGATIVE
PROTHROMBIN TIME: 11.3 SECONDS (ref 9–12.5)
RBC # BLD AUTO: 3.06 M/UL (ref 4–5.4)
RBC #/AREA URNS AUTO: <1 /HPF (ref 0–4)
RELATIVE EOSINOPHIL (OHS): 0.3 %
RELATIVE LYMPHOCYTE (OHS): 9.3 % (ref 18–48)
RELATIVE MONOCYTE (OHS): 10.5 % (ref 4–15)
RELATIVE NEUTROPHIL (OHS): 79.1 % (ref 38–73)
SODIUM SERPL-SCNC: 127 MMOL/L (ref 136–145)
SODIUM SERPL-SCNC: 128 MMOL/L (ref 136–145)
SP GR UR STRIP: 1.01
SQUAMOUS #/AREA URNS AUTO: 2 /HPF
TROPONIN I SERPL HS-MCNC: 7 NG/L
UROBILINOGEN UR STRIP-ACNC: NEGATIVE EU/DL
WBC # BLD AUTO: 3.99 K/UL (ref 3.9–12.7)
WBC #/AREA URNS AUTO: 3 /HPF (ref 0–5)

## 2025-07-17 PROCEDURE — G0378 HOSPITAL OBSERVATION PER HR: HCPCS | Mod: HCNC

## 2025-07-17 PROCEDURE — 96375 TX/PRO/DX INJ NEW DRUG ADDON: CPT | Mod: HCNC

## 2025-07-17 PROCEDURE — 93005 ELECTROCARDIOGRAM TRACING: CPT | Mod: HCNC

## 2025-07-17 PROCEDURE — 85730 THROMBOPLASTIN TIME PARTIAL: CPT | Mod: HCNC | Performed by: PHYSICIAN ASSISTANT

## 2025-07-17 PROCEDURE — 63600175 PHARM REV CODE 636 W HCPCS: Mod: JZ,TB,HCNC | Performed by: INTERNAL MEDICINE

## 2025-07-17 PROCEDURE — 63600175 PHARM REV CODE 636 W HCPCS: Mod: HCNC | Performed by: PHYSICIAN ASSISTANT

## 2025-07-17 PROCEDURE — 94761 N-INVAS EAR/PLS OXIMETRY MLT: CPT | Mod: HCNC

## 2025-07-17 PROCEDURE — 25000003 PHARM REV CODE 250: Mod: HCNC | Performed by: INTERNAL MEDICINE

## 2025-07-17 PROCEDURE — 83880 ASSAY OF NATRIURETIC PEPTIDE: CPT | Mod: HCNC | Performed by: PHYSICIAN ASSISTANT

## 2025-07-17 PROCEDURE — 85610 PROTHROMBIN TIME: CPT | Mod: HCNC | Performed by: PHYSICIAN ASSISTANT

## 2025-07-17 PROCEDURE — 83735 ASSAY OF MAGNESIUM: CPT | Mod: HCNC | Performed by: PHYSICIAN ASSISTANT

## 2025-07-17 PROCEDURE — 84484 ASSAY OF TROPONIN QUANT: CPT | Mod: HCNC | Performed by: PHYSICIAN ASSISTANT

## 2025-07-17 PROCEDURE — 83935 ASSAY OF URINE OSMOLALITY: CPT | Mod: HCNC | Performed by: INTERNAL MEDICINE

## 2025-07-17 PROCEDURE — 85025 COMPLETE CBC W/AUTO DIFF WBC: CPT | Mod: HCNC | Performed by: PHYSICIAN ASSISTANT

## 2025-07-17 PROCEDURE — 83930 ASSAY OF BLOOD OSMOLALITY: CPT | Mod: HCNC | Performed by: INTERNAL MEDICINE

## 2025-07-17 PROCEDURE — 96376 TX/PRO/DX INJ SAME DRUG ADON: CPT | Mod: HCNC

## 2025-07-17 PROCEDURE — 96372 THER/PROPH/DIAG INJ SC/IM: CPT | Performed by: INTERNAL MEDICINE

## 2025-07-17 PROCEDURE — 80053 COMPREHEN METABOLIC PANEL: CPT | Mod: HCNC | Performed by: PHYSICIAN ASSISTANT

## 2025-07-17 PROCEDURE — 81003 URINALYSIS AUTO W/O SCOPE: CPT | Mod: HCNC | Performed by: PHYSICIAN ASSISTANT

## 2025-07-17 PROCEDURE — 93010 ELECTROCARDIOGRAM REPORT: CPT | Mod: HCNC,,, | Performed by: INTERNAL MEDICINE

## 2025-07-17 PROCEDURE — 99291 CRITICAL CARE FIRST HOUR: CPT | Mod: HCNC

## 2025-07-17 PROCEDURE — 25000003 PHARM REV CODE 250: Mod: HCNC | Performed by: PHYSICIAN ASSISTANT

## 2025-07-17 PROCEDURE — 96374 THER/PROPH/DIAG INJ IV PUSH: CPT | Mod: HCNC

## 2025-07-17 RX ORDER — ASPIRIN 81 MG/1
81 TABLET ORAL DAILY
Status: DISCONTINUED | OUTPATIENT
Start: 2025-07-18 | End: 2025-07-22 | Stop reason: HOSPADM

## 2025-07-17 RX ORDER — SODIUM CHLORIDE 0.9 % (FLUSH) 0.9 %
10 SYRINGE (ML) INJECTION EVERY 12 HOURS PRN
Status: DISCONTINUED | OUTPATIENT
Start: 2025-07-17 | End: 2025-07-22 | Stop reason: HOSPADM

## 2025-07-17 RX ORDER — IBUPROFEN 200 MG
16 TABLET ORAL
Status: DISCONTINUED | OUTPATIENT
Start: 2025-07-17 | End: 2025-07-22 | Stop reason: HOSPADM

## 2025-07-17 RX ORDER — GLUCAGON 1 MG
1 KIT INJECTION
Status: DISCONTINUED | OUTPATIENT
Start: 2025-07-17 | End: 2025-07-22 | Stop reason: HOSPADM

## 2025-07-17 RX ORDER — KETOROLAC TROMETHAMINE 30 MG/ML
15 INJECTION, SOLUTION INTRAMUSCULAR; INTRAVENOUS ONCE AS NEEDED
Status: COMPLETED | OUTPATIENT
Start: 2025-07-17 | End: 2025-07-17

## 2025-07-17 RX ORDER — ACETAMINOPHEN 325 MG/1
650 TABLET ORAL EVERY 6 HOURS PRN
Status: DISCONTINUED | OUTPATIENT
Start: 2025-07-17 | End: 2025-07-18

## 2025-07-17 RX ORDER — PRAVASTATIN SODIUM 20 MG/1
40 TABLET ORAL DAILY
Status: DISCONTINUED | OUTPATIENT
Start: 2025-07-18 | End: 2025-07-22 | Stop reason: HOSPADM

## 2025-07-17 RX ORDER — FUROSEMIDE 10 MG/ML
80 INJECTION INTRAMUSCULAR; INTRAVENOUS
Status: COMPLETED | OUTPATIENT
Start: 2025-07-17 | End: 2025-07-17

## 2025-07-17 RX ORDER — ACETAMINOPHEN 325 MG/1
650 TABLET ORAL
Status: COMPLETED | OUTPATIENT
Start: 2025-07-17 | End: 2025-07-17

## 2025-07-17 RX ORDER — HEPARIN SODIUM 5000 [USP'U]/ML
5000 INJECTION, SOLUTION INTRAVENOUS; SUBCUTANEOUS EVERY 8 HOURS
Status: DISCONTINUED | OUTPATIENT
Start: 2025-07-17 | End: 2025-07-22 | Stop reason: HOSPADM

## 2025-07-17 RX ORDER — TALC
6 POWDER (GRAM) TOPICAL NIGHTLY PRN
Status: DISCONTINUED | OUTPATIENT
Start: 2025-07-17 | End: 2025-07-22 | Stop reason: HOSPADM

## 2025-07-17 RX ORDER — NALOXONE HCL 0.4 MG/ML
0.02 VIAL (ML) INJECTION
Status: DISCONTINUED | OUTPATIENT
Start: 2025-07-17 | End: 2025-07-22 | Stop reason: HOSPADM

## 2025-07-17 RX ORDER — FUROSEMIDE 10 MG/ML
40 INJECTION INTRAMUSCULAR; INTRAVENOUS DAILY
Status: DISCONTINUED | OUTPATIENT
Start: 2025-07-18 | End: 2025-07-18

## 2025-07-17 RX ORDER — IBUPROFEN 200 MG
24 TABLET ORAL
Status: DISCONTINUED | OUTPATIENT
Start: 2025-07-17 | End: 2025-07-22 | Stop reason: HOSPADM

## 2025-07-17 RX ADMIN — Medication 3 MG: at 10:07

## 2025-07-17 RX ADMIN — SODIUM ZIRCONIUM CYCLOSILICATE 10 G: 10 POWDER, FOR SUSPENSION ORAL at 03:07

## 2025-07-17 RX ADMIN — HEPARIN SODIUM 5000 UNITS: 5000 INJECTION INTRAVENOUS; SUBCUTANEOUS at 10:07

## 2025-07-17 RX ADMIN — KETOROLAC TROMETHAMINE 15 MG: 30 INJECTION, SOLUTION INTRAMUSCULAR; INTRAVENOUS at 10:07

## 2025-07-17 RX ADMIN — ACETAMINOPHEN 650 MG: 325 TABLET ORAL at 05:07

## 2025-07-17 RX ADMIN — FUROSEMIDE 80 MG: 10 INJECTION, SOLUTION INTRAVENOUS at 03:07

## 2025-07-17 NOTE — Clinical Note
Diagnosis: Fall [858322]   Future Attending Provider: ALDEN KINSEY [[id number]]   Is the patient being sent to ED Observation?: No

## 2025-07-17 NOTE — ED TRIAGE NOTES
Patient arrived to ED for complaints of increased tremors and pain of left leg from a post fall two days ago. Patient denies hitting her head or losing consciousness. Patient also stating she is now experiencing tremors in upper bilateral extremities. Patient voice having chest discomfort and SOB. Patient denies N/V.

## 2025-07-17 NOTE — ED PROVIDER NOTES
"Encounter Date: 7/17/2025       History     Chief Complaint   Patient presents with    Fall     Fell 2 days, states hue to shakes, pain to L knee and lower leg     89-year-old female with a past medical history of HTN, DM, CKD, TIA, chronic atrial fibrillation status post Watchman procedure is presenting to the ED after a fall that occurred 2 days ago.  The patient states she has been having tremors in her left lower extremity for the past 6 months; her left leg became very tremulous and she kicked her walker away (uses walker at baseline) causing her to fall.  EMS was called no and is evaluated the patient.  She has been ambulatory with her walker since but is still experiencing the tremors.  She is complaining of pain to bilateral knees.  She has a bruise on her right upper arm but is unsure if that was from the fall.  Today she began noticing the tremulous sensation in her chest with associated pain and shortness of breath.  Her daughter at bedside states the patient has been more winded over the past few days.  The patient's daughter did express concern that the patient is having multiple falls due to the tremors that appear to be worsening, however they have been unable to see Neurology.  The patient states she has scared to walk at home even with her walker due to the falls.    The history is provided by the patient. No  was used.     Review of patient's allergies indicates:   Allergen Reactions    Opioids - morphine analogues Other (See Comments)     Bowel issues; bowel obstruction    Tizanidine Other (See Comments)     "Lips were numb, almost passed out."    Tramadol Hallucinations    Morphine     Opioids-meperidine and related     Ciprofloxacin Rash     Past Medical History:   Diagnosis Date    Amblyopia of left eye 04/10/2013    Arthritis     Facet arthropathy, Lumbosacral    Atherosclerosis of aorta     Cataract     Central retinal vein occlusion of left eye     CKD (chronic kidney " disease) stage 3, GFR 30-59 ml/min     Diabetes mellitus, type 2     Diabetic polyneuropathy 2022    Exotropia of both eyes 2013    recession RSR 5.0mm w/ adj; recession LR os 5.0 w/ adj; resect MR os  4.0mm    Hearing loss     History of resection of small bowel     Hypertensive retinopathy of both eyes     Hypoglycemia     Macular degeneration     OA (osteoarthritis) of shoulder     Right    Osteoporosis     Posterior vitreous detachment of both eyes     Psychiatric problem     Rhinitis     TIA (transient ischemic attack)      Past Surgical History:   Procedure Laterality Date    APPENDECTOMY      CARDIAC CATHETERIZATION      CATARACT EXTRACTION W/  INTRAOCULAR LENS IMPLANT Bilateral      SECTION, CLASSIC      CLOSURE OF LEFT ATRIAL APPENDAGE USING DEVICE N/A 2020    Procedure: Left atrial appendage closure device;  Surgeon: Abundio Curtis MD;  Location: [facility] CATH LAB;  Service: Cardiology;  Laterality: N/A;    HYSTERECTOMY      INNER EAR SURGERY      IRRIGATION AND DEBRIDEMENT OF UPPER EXTREMITY Right 2024    Procedure: IRRIGATION AND DEBRIDEMENT, UPPER EXTREMITY;  Surgeon: Con Moran Jr., MD;  Location: [facility];  Service: Orthopedics;  Laterality: Right;    JOINT REPLACEMENT      LEFT KNEE REPLACEMENT IN 2013 -    OOPHORECTOMY      SINUS SURGERY      STRABISMUS SURGERY  13    RSR recession 5 mm, LLR recession 5 mm and LMR resection 4mm    STRABISMUS SURGERY  2014    recess LR OD 6mm    TONSILLECTOMY      watchman surgery N/A 2020     Family History   Problem Relation Name Age of Onset    Hypertension Mother      Hypertension Father      Liver disease Sister      Diabetes Sister      Heart disease Sister      Diabetes Brother      Breast cancer Maternal Aunt      No Known Problems Maternal Uncle      No Known Problems Paternal Aunt      No Known Problems Paternal Uncle      No Known Problems Maternal Grandmother      No Known Problems Maternal Grandfather       No Known Problems Paternal Grandmother      No Known Problems Paternal Grandfather      No Known Problems Daughter      No Known Problems Son      Heart disease Sister      No Known Problems Son      No Known Problems Son      Cancer Neg Hx          in first degree relatives    Melanoma Neg Hx      Psoriasis Neg Hx      Lupus Neg Hx      Amblyopia Neg Hx      Blindness Neg Hx      Cataracts Neg Hx      Glaucoma Neg Hx      Macular degeneration Neg Hx      Retinal detachment Neg Hx      Strabismus Neg Hx      Stroke Neg Hx      Thyroid disease Neg Hx       Social History[1]  Review of Systems   Constitutional:  Negative for diaphoresis, fatigue and fever.   Respiratory:  Positive for shortness of breath. Negative for cough.    Cardiovascular:  Positive for chest pain and palpitations.   Gastrointestinal:  Negative for abdominal pain, nausea, rectal pain and vomiting.   Genitourinary:  Negative for dysuria and frequency.   Musculoskeletal:  Positive for arthralgias. Negative for back pain and neck pain.   Neurological:  Positive for tremors. Negative for dizziness, weakness, light-headedness, numbness and headaches.   Hematological:  Bruises/bleeds easily.   All other systems reviewed and are negative.      Physical Exam     Initial Vitals [07/17/25 1152]   BP Pulse Resp Temp SpO2   (!) 148/67 75 18 98.3 °F (36.8 °C) 98 %      MAP       --         Physical Exam    Constitutional: She appears well-developed. She is not diaphoretic. She is active.  Non-toxic appearance. She does not have a sickly appearance. She does not appear ill. No distress.   HENT:   Head: Normocephalic and atraumatic.   Eyes: Conjunctivae, EOM and lids are normal.   Neck: Neck supple.   Normal range of motion.  Cardiovascular:  Normal rate, regular rhythm and normal heart sounds.           Pulmonary/Chest: Effort normal and breath sounds normal. No apnea. No respiratory distress. She has no decreased breath sounds. She has no wheezes. She has no  rhonchi. She has no rales.   Abdominal: Abdomen is soft and flat. There is no abdominal tenderness.   No right CVA tenderness.  No left CVA tenderness. There is no rebound and no guarding.   Musculoskeletal:      Right shoulder: Normal. No deformity, tenderness or bony tenderness. Normal range of motion.      Right upper arm: Tenderness present.      Right elbow: Normal.      Cervical back: Normal, normal range of motion and neck supple. No deformity, tenderness or bony tenderness. No spinous process tenderness or muscular tenderness.      Thoracic back: Normal. No deformity, tenderness or bony tenderness.      Lumbar back: Normal. No deformity, tenderness or bony tenderness.      Right knee: No swelling, deformity or bony tenderness. Normal range of motion. No tenderness.      Left knee: No swelling, deformity or bony tenderness. Normal range of motion. No tenderness.      Right lower leg: No tenderness. Edema present.      Left lower leg: No tenderness. Edema present.      Right ankle: Normal. Normal range of motion.      Left ankle: Normal. Normal range of motion.      Right foot: Normal pulse.      Left foot: Normal pulse.      Comments: Large bruise to right upper arm. No tenderness to palpation.      Neurological: She is alert and oriented to person, place, and time. No cranial nerve deficit or sensory deficit. She exhibits normal muscle tone.   Skin: Skin is warm and dry. Bruising and ecchymosis noted. No laceration noted.   Large bruise that appears fresh to right upper arm.  Diffuse ecchymosis to forearms and hands bilaterally; unchanged from baseline per daughter at bedside.         ED Course   Critical Care    Date/Time: 7/17/2025 5:14 PM    Performed by: Sharon Castaneda PA-C  Authorized by: Sharon Castaneda PA-C  Direct patient critical care time: 20 minutes  Additional history critical care time: 10 minutes  Ordering / reviewing critical care time: 5 minutes  Documentation critical care time: 5  minutes  Other critical care time: 5 minutes  Total critical care time (exclusive of procedural time) : 45 minutes  Critical care time was exclusive of separately billable procedures and treating other patients.  Critical care was necessary to treat or prevent imminent or life-threatening deterioration of the following conditions: renal failure, cardiac failure, circulatory failure and metabolic crisis.  Critical care was time spent personally by me on the following activities: evaluation of patient's response to treatment, examination of patient, obtaining history from patient or surrogate, ordering and performing treatments and interventions, ordering and review of laboratory studies, ordering and review of radiographic studies, pulse oximetry, re-evaluation of patient's condition and review of old charts.        Labs Reviewed   COMPREHENSIVE METABOLIC PANEL - Abnormal       Result Value    Sodium 128 (*)     Potassium 5.6 (*)     Chloride 99      CO2 20 (*)     Glucose 70      BUN 53 (*)     Creatinine 1.9 (*)     Calcium 8.7      Protein Total 6.8      Albumin 4.2      Bilirubin Total 0.7      ALP 96      AST 18      ALT 15      Anion Gap 9      eGFR 24 (*)    B-TYPE NATRIURETIC PEPTIDE - Abnormal     (*)    URINALYSIS, REFLEX TO URINE CULTURE - Abnormal    Color, UA Yellow      Appearance, UA Clear      pH, UA 5.0      Spec Grav UA 1.010      Protein, UA Negative      Glucose, UA Negative      Ketones, UA Negative      Bilirubin, UA Negative      Blood, UA Negative      Nitrites, UA Negative      Urobilinogen, UA Negative      Leukocyte Esterase, UA Trace (*)    CBC WITH DIFFERENTIAL - Abnormal    WBC 3.99      RBC 3.06 (*)     HGB 9.0 (*)     HCT 26.7 (*)     MCV 87      MCH 29.4      MCHC 33.7      RDW 16.7 (*)     Platelet Count 118 (*)     MPV 10.3      Nucleated RBC 0      Neut % 79.1 (*)     Lymph % 9.3 (*)     Mono % 10.5      Eos % 0.3      Basophil % 0.3      Imm Grans % 0.5      Neut # 3.16       Lymph # 0.37 (*)     Mono # 0.42      Eos # 0.01      Baso # 0.01      Imm Grans # 0.02     TROPONIN I HIGH SENSITIVITY - Normal    Troponin High Sensitive 7     MAGNESIUM - Normal    Magnesium  2.0     APTT - Normal    PTT 27.5     PROTIME-INR - Normal    PT 11.3      INR 1.0     CBC W/ AUTO DIFFERENTIAL    Narrative:     The following orders were created for panel order CBC auto differential.  Procedure                               Abnormality         Status                     ---------                               -----------         ------                     CBC with Differential[[id number]]       Abnormal            Final result                 Please view results for these tests on the individual orders.   URINALYSIS MICROSCOPIC    RBC, UA <1      WBC, UA 3      Bacteria, UA Rare      Squamous Epithelial Cells, UA 2      Hyaline Casts, UA 1      Microscopic Comment       GREY TOP URINE HOLD   POCT GLUCOSE MONITORING CONTINUOUS     EKG Readings: (Independently Interpreted)   Initial Reading: No STEMI. Previous EKG: Compared with most recent EKG Previous EKG Date: 4/8/24. Rhythm: Atrial Fibrillation. Heart Rate: 69. Clinical Impression: Atrial Fibrillation       Imaging Results              CT Lumbar Spine Without Contrast (Final result)  Result time 07/17/25 17:08:40      Final result by Joesph Chery MD (07/17/25 17:08:40)                   Impression:      No acute fracture or traumatic malalignment.    Advanced degenerative changes with high-grade spinal canal and neural foraminal stenosis at multiple levels as described above.    Electronically signed by resident: Celestino Lópze  Date:    07/17/2025  Time:    15:47    Electronically signed by: Joesph Chery MD  Date:    07/17/2025  Time:    17:08               Narrative:    EXAMINATION:  CT LUMBAR SPINE WITHOUT CONTRAST    CLINICAL HISTORY:  Low back pain, trauma;    TECHNIQUE:  Low-dose axial, sagittal and coronal reformations are obtained  through the lumbar spine.  Contrast was not administered.    COMPARISON:  CT lumbar spine 06/20/2025 and CT abdomen pelvis 04/08/2024    FINDINGS:  Alignment: Normal sagittal alignment. No spondylolisthesis.    Vertebrae: Diffuse osteopenia.  Transitional lumbosacral anatomy with partial lumbarization of S1.  Vertebral body heights are unchanged.  Stable compression fracture at L3 versus prominent superior endplate Schmorl's node.  No acute fracture or dislocation. No osseous destructive lesions.    Discs: Disc heights are diffusely narrowed with prominent vacuum disc phenomenon.    Sacroiliac joints: Degenerative change bilaterally with subchondral cystic change and right-sided vacuum phenomenon.    Degenerative findings:    T12-L1: Partially calcified central disc protrusion and advanced facet arthropathy resulting in severe spinal canal stenosis.  Severe right and moderate left foraminal narrowing.    L1-L2: Circumferential disc bulge and facet arthropathy resulting in moderate spinal canal stenosis.  Severe right and mild left neural foraminal narrowing.    L2-L3: Partially calcified circumferential disc bulge, facet arthropathy and ligamentum flavum ossification resulting in severe spinal canal stenosis.  Moderate to severe bilateral neural foraminal narrowing.    L3-L4: Partially calcified circumferential disc bulge and advanced bilateral facet arthropathy resulting in moderate to severe spinal canal stenosis.  Severe bilateral neural foraminal narrowing.    L4-L5: Circumferential disc bulge with inferiorly projecting central extrusion and advanced bilateral facet arthropathy resulting in severe spinal canal stenosis.  Moderate left and severe right neural foraminal narrowing.    L5-S1: Circumferential disc bulge and advanced bilateral facet arthropathy resulting in severe spinal canal stenosis.  Moderate bilateral neural foraminal narrowing.    Paraspinal muscles & soft tissues: Paraspinal muscle atrophy.   Advanced calcific atherosclerosis.  Colonic diverticulosis.                                       CT Cervical Spine Without Contrast (Final result)  Result time 07/17/25 16:12:39      Final result by Rohith Rios MD (07/17/25 16:12:39)                   Impression:      Multilevel cervical spondylosis similar to prior, without evidence of an acute displaced fracture.    Electronically signed by resident: Ronit Schwarz  Date:    07/17/2025  Time:    15:36    Electronically signed by: Rohith Rios MD  Date:    07/17/2025  Time:    16:12               Narrative:    EXAMINATION:  CT CERVICAL SPINE WITHOUT CONTRAST    CLINICAL HISTORY:  Neck trauma (Age >= 65y);    TECHNIQUE:  Low dose axial CT images through the cervical spine, with sagittal and coronal reformations.  Contrast was not administered.    COMPARISON:  CT cervical spine 12/07/2024    FINDINGS:  The vertebral bodies appear similar to the prior exam.  No evidence of an acute displaced fracture or osseous destructive process.    Subtle retrolisthesis at C3-4 and anterolisthesis at C6-7, C7-T1 and T1-2, as before.  No new spondylolisthesis.    Mild multilevel degenerative change throughout the cervical spine, again noting loss of disc height at C3-4 where there is some associated spinal canal encroachment.  No new disc height loss.  Additional canal encroachment at C4-5 better delineated on prior MRI.  There is persistent multilevel neural foraminal narrowing from uncovertebral and facet hypertrophy.    Limited evaluation of the intraspinal contents demonstrates no hematoma or mass.    Paraspinal soft tissues exhibit no acute abnormalities.  Scattered vascular calcification.                                       CT Head Without Contrast (Final result)  Result time 07/17/25 16:07:44      Final result by Rohith Rios MD (07/17/25 16:07:44)                   Impression:      No evidence of acute intracranial hemorrhage.    Electronically signed by  resident: Ronit Schwarz  Date:    07/17/2025  Time:    15:27    Electronically signed by: Rohith Rios MD  Date:    07/17/2025  Time:    16:07               Narrative:    EXAMINATION:  CT HEAD WITHOUT CONTRAST    CLINICAL HISTORY:  Head trauma, minor (Age >= 65y);    TECHNIQUE:  Low dose axial CT images obtained throughout the head without the use of intravenous contrast.  Axial, sagittal and coronal reconstructions were performed.    COMPARISON:  CT head 12/07/2024    FINDINGS:  Intracranial compartment:    Ventricles are stable in size.  Pattern of cerebral volume loss, without evidence of hydrocephalus.    Chronic small vessel ischemic change in the supratentorial white matter, similar to prior exam.  No new parenchymal hemorrhage, edema, mass effect or major vascular distribution infarct.    No new extra-axial blood or fluid collections.    Skull/extracranial contents (limited evaluation):    No displaced calvarial fracture.    The mastoid air cells and visualized paranasal sinuses are essentially clear.                                       X-Ray Knee 1 or 2 View Bilateral (Final result)  Result time 07/17/25 13:09:08      Final result by José Miguel Ovalle MD (07/17/25 13:09:08)                   Impression:      No acute osseous abnormality.  Left knee arthroplasty.  Right knee degenerative change.      Electronically signed by: José Miguel Ovalle MD  Date:    07/17/2025  Time:    13:09               Narrative:    EXAMINATION:  XR KNEE 1 OR 2 VIEW BILATERAL    CLINICAL HISTORY:  Fall;    TECHNIQUE:  Two views right knee and two views left knee    COMPARISON:  03/11/2018    FINDINGS:  There is a cemented left total knee arthroplasty.  No evidence for loosening, migration, or periprosthetic fracture right knee demonstrates moderate tricompartment joint space loss.  No fracture, dislocation, or joint effusion.  There are moderate vascular calcifications.                                       X-Ray Hips  Bilateral 2 View Incl AP Pelvis (Final result)  Result time 07/17/25 13:00:02      Final result by Efrem Anne MD (07/17/25 13:00:02)                   Impression:      1. No convincing acute displaced fracture or dislocation of the left or right hip.      Electronically signed by: Efrem Anne MD  Date:    07/17/2025  Time:    13:00               Narrative:    EXAMINATION:  XR HIPS BILATERAL 2 VIEW INCL AP PELVIS    CLINICAL HISTORY:  Unspecified fall, initial encounter    TECHNIQUE:  AP view of the pelvis and frogleg lateral views of both hips were performed.    COMPARISON:  11/23/2022    FINDINGS:  Two views bilateral hips.    Please note, artifact from habitus limits visualization of the osseous structures.    Allowing for the above, the bilateral sacroiliac joints are intact.  The pubic symphysis is intact noting degenerative change.  The bilateral femoroacetabular joints are intact.                                       X-Ray Humerus 2 View Right (Final result)  Result time 07/17/25 12:58:44      Final result by Efrem Anne MD (07/17/25 12:58:44)                   Impression:      1. No convincing acute displaced fracture or dislocation of the humerus.      Electronically signed by: Efrem Anne MD  Date:    07/17/2025  Time:    12:58               Narrative:    EXAMINATION:  XR HUMERUS 2 VIEW RIGHT    CLINICAL HISTORY:  Unspecified fall, initial encounter    COMPARISON:  None    FINDINGS:  Two views right humerus.    The right humeral head maintains appropriate relationship with the glenoid. There is osteopenia. No convincing acute displaced fracture or dislocation of the humerus. The elbow appears intact.                                       X-Ray Chest AP Portable (Final result)  Result time 07/17/25 13:02:47      Final result by Srinivas Mace MD (07/17/25 13:02:47)                   Impression:      Continued demonstration of cardiomegaly, but there has been no significant  detrimental interval change in the appearance of the chest since 02/16/2024.      Electronically signed by: Srinivas Mace MD  Date:    07/17/2025  Time:    13:02               Narrative:    EXAMINATION:  XR CHEST AP PORTABLE    CLINICAL HISTORY:  Chest pain;    TECHNIQUE:  One view    COMPARISON:  Comparison is made to 02/16/2024.    FINDINGS:  Left atrial appendage closure device again observed, as is partial visualization of the intramedullary nail within the left humerus seen previously.  The heart is again noted to be enlarged, but the degree of cardiomegaly and the appearance of the cardiomediastinal silhouette and pulmonary vascularity have not changed appreciably since the examination referenced above.  Lung zones appear unchanged as well, and are free of significant airspace consolidation or volume loss.  No pleural fluid of any substantial volume is noted on either side.  No pneumothorax.  Incidental observations include right upper quadrant surgical clips and calcification in the wall of the aortic arch.                                    X-Rays:   Independently Interpreted Readings:   Chest X-Ray: Cardiomegaly present. Increased vascular markings unchanged from previous chest x-ray.   Other Readings:  Right humerus x-ray: No acute osseous findings.    Pelvis x-ray:  No acute osseous findings, fracture, or dislocation.  Bilateral knee x-ray:  Hardware in left knee.  No acute osseous findings, fracture, or dislocation.    Medications   sodium zirconium cyclosilicate packet 10 g (10 g Oral Given 7/17/25 1524)   furosemide injection 80 mg (80 mg Intravenous Given 7/17/25 1524)   acetaminophen tablet 650 mg (650 mg Oral Given 7/17/25 1723)     Medical Decision Making  89-year-old female with multiple chronic medical comorbidities presenting to the ED with her daughter for further evaluation after a fall that occurred 2 days ago.  The patient's daughter expressed concern that the patient has been experiencing  worsening left lower extremity tremors; the patient believes the tremors are what caused her to fall.  The patient's daughter has been attempting to get the patient establishes with Neurology; per chart review, PCP had placed an ambulatory referral to Neurology, however the patient has not seen a provider yet.  The patient also reported chest pain and shortness of breath beginning shortly prior to arrival.  Broad workup initiated to evaluate for injuries secondary to the fall in addition to cardiac workup.  The patient was unsure if she hit her head, however given age CT head and C-spine were ordered.  Workup significant for hyperkalemia, elevated BNP.  She was treated with Lasix for both the hyper K and elevated BNP given she has had recent shortness of breath, and she was also given Lokelma for the hyper K. no abnormal findings on imaging.  Hospital medicine consulted for admission.    Problems Addressed:  Acute congestive heart failure, unspecified heart failure type: acute illness or injury  JOSHUA (acute kidney injury): acute illness or injury  Chest pain, unspecified type: acute illness or injury  Decreased activities of daily living (ADL): complicated acute illness or injury  Fall: acute illness or injury  Frequent falls: acute illness or injury  Hyperkalemia: acute illness or injury that poses a threat to life or bodily functions  Shortness of breath: acute illness or injury  Tremor: chronic illness or injury with exacerbation, progression, or side effects of treatment    Amount and/or Complexity of Data Reviewed  Independent Historian:      Details: Daughter  Labs: ordered. Decision-making details documented in ED Course.  Radiology: ordered and independent interpretation performed.  ECG/medicine tests: ordered and independent interpretation performed.    Risk  OTC drugs.  Prescription drug management.  Decision regarding hospitalization.    Critical Care  Total time providing critical care: 45 minutes                ED Course as of 25 1741   Thu 2025   1402 Hemoglobin(!): 9.0  Mildly decreased from 1 month ago (10.2).  [SE]   1410 BNP(!): 273  Will give dose of Lasix given she is more short of breath.  [SE]   1412 Potassium(!): 5.6 [SE]   1412 Creatinine(!): 1.9 [SE]   1412 BNP(!): 273 [SE]   1732 Hospital medicine consulted for admission.  [SE]      ED Course User Index  [SE] Sharon Castaneda PA-C                               Clinical Impression:  Final diagnoses:  [W19.XXXA] Fall (Primary)  [R25.1] Tremor  [R07.9] Chest pain, unspecified type  [R06.02] Shortness of breath  [Z99.89] Uses roller walker  [Z78.9] Decreased activities of daily living (ADL)  [R29.6] Frequent falls  [E87.5] Hyperkalemia  [N17.9] JOSHUA (acute kidney injury)  [I50.9] Acute congestive heart failure, unspecified heart failure type          ED Disposition Condition    Observation                       [1]   Social History  Tobacco Use    Smoking status: Former     Current packs/day: 0.00     Types: Cigarettes     Quit date: 10/29/1982     Years since quittin.7     Passive exposure: Never    Smokeless tobacco: Never   Substance Use Topics    Alcohol use: Yes     Alcohol/week: 2.0 standard drinks of alcohol     Types: 2 Shots of liquor per week     Comment: less than once every 6 months    Drug use: No        Sharon Castaneda PA-C  [id number]

## 2025-07-18 PROBLEM — I50.33 ACUTE ON CHRONIC HEART FAILURE WITH PRESERVED EJECTION FRACTION: Status: ACTIVE | Noted: 2023-04-24

## 2025-07-18 LAB
ALBUMIN SERPL BCP-MCNC: 3.9 G/DL (ref 3.5–5.2)
ALP SERPL-CCNC: 89 UNIT/L (ref 40–150)
ALT SERPL W/O P-5'-P-CCNC: 14 UNIT/L (ref 10–44)
ANION GAP (OHS): 9 MMOL/L (ref 8–16)
AST SERPL-CCNC: 19 UNIT/L (ref 11–45)
BILIRUB SERPL-MCNC: 0.9 MG/DL (ref 0.1–1)
BUN SERPL-MCNC: 53 MG/DL (ref 10–30)
CALCIUM SERPL-MCNC: 8.5 MG/DL (ref 8.7–10.5)
CHLORIDE SERPL-SCNC: 100 MMOL/L (ref 95–110)
CO2 SERPL-SCNC: 19 MMOL/L (ref 23–29)
CREAT SERPL-MCNC: 1.9 MG/DL (ref 0.5–1.4)
ERYTHROCYTE [DISTWIDTH] IN BLOOD BY AUTOMATED COUNT: 16.8 % (ref 11.5–14.5)
GFR SERPLBLD CREATININE-BSD FMLA CKD-EPI: 24 ML/MIN/1.73/M2
GLUCOSE SERPL-MCNC: 88 MG/DL (ref 70–110)
HCT VFR BLD AUTO: 25.5 % (ref 37–48.5)
HGB BLD-MCNC: 8.7 GM/DL (ref 12–16)
HOLD SPECIMEN: NORMAL
MAGNESIUM SERPL-MCNC: 1.9 MG/DL (ref 1.6–2.6)
MCH RBC QN AUTO: 29.7 PG (ref 27–31)
MCHC RBC AUTO-ENTMCNC: 34.1 G/DL (ref 32–36)
MCV RBC AUTO: 87 FL (ref 82–98)
OHS QRS DURATION: 78 MS
OHS QTC CALCULATION: 394 MS
PLATELET # BLD AUTO: 118 K/UL (ref 150–450)
PMV BLD AUTO: 10.6 FL (ref 9.2–12.9)
POTASSIUM SERPL-SCNC: 5.1 MMOL/L (ref 3.5–5.1)
PROT SERPL-MCNC: 6.3 GM/DL (ref 6–8.4)
RBC # BLD AUTO: 2.93 M/UL (ref 4–5.4)
SODIUM SERPL-SCNC: 128 MMOL/L (ref 136–145)
SODIUM UR-SCNC: 64 MMOL/L (ref 20–250)
TSH SERPL-ACNC: 1.87 UIU/ML (ref 0.4–4)
VIT B12 SERPL-MCNC: 581 PG/ML (ref 210–950)
WBC # BLD AUTO: 2.94 K/UL (ref 3.9–12.7)

## 2025-07-18 PROCEDURE — 97162 PT EVAL MOD COMPLEX 30 MIN: CPT | Mod: HCNC

## 2025-07-18 PROCEDURE — 63600175 PHARM REV CODE 636 W HCPCS: Mod: HCNC | Performed by: INTERNAL MEDICINE

## 2025-07-18 PROCEDURE — 25000003 PHARM REV CODE 250: Mod: HCNC | Performed by: NURSE PRACTITIONER

## 2025-07-18 PROCEDURE — 63600175 PHARM REV CODE 636 W HCPCS: Mod: HCNC | Performed by: HOSPITALIST

## 2025-07-18 PROCEDURE — 82607 VITAMIN B-12: CPT | Mod: HCNC | Performed by: HOSPITALIST

## 2025-07-18 PROCEDURE — 25000003 PHARM REV CODE 250: Mod: HCNC | Performed by: INTERNAL MEDICINE

## 2025-07-18 PROCEDURE — 84300 ASSAY OF URINE SODIUM: CPT | Mod: HCNC | Performed by: INTERNAL MEDICINE

## 2025-07-18 PROCEDURE — 94761 N-INVAS EAR/PLS OXIMETRY MLT: CPT | Mod: HCNC

## 2025-07-18 PROCEDURE — 25000003 PHARM REV CODE 250: Mod: HCNC | Performed by: HOSPITALIST

## 2025-07-18 PROCEDURE — 36415 COLL VENOUS BLD VENIPUNCTURE: CPT | Mod: HCNC | Performed by: HOSPITALIST

## 2025-07-18 PROCEDURE — 96372 THER/PROPH/DIAG INJ SC/IM: CPT | Performed by: INTERNAL MEDICINE

## 2025-07-18 PROCEDURE — 84443 ASSAY THYROID STIM HORMONE: CPT | Mod: HCNC | Performed by: HOSPITALIST

## 2025-07-18 PROCEDURE — 97116 GAIT TRAINING THERAPY: CPT | Mod: HCNC

## 2025-07-18 PROCEDURE — 97530 THERAPEUTIC ACTIVITIES: CPT | Mod: HCNC

## 2025-07-18 PROCEDURE — 80053 COMPREHEN METABOLIC PANEL: CPT | Mod: HCNC | Performed by: HOSPITALIST

## 2025-07-18 PROCEDURE — 99223 1ST HOSP IP/OBS HIGH 75: CPT | Mod: HCNC,GC,, | Performed by: PSYCHIATRY & NEUROLOGY

## 2025-07-18 PROCEDURE — 21400001 HC TELEMETRY ROOM: Mod: HCNC

## 2025-07-18 PROCEDURE — 84425 ASSAY OF VITAMIN B-1: CPT | Mod: HCNC | Performed by: HOSPITALIST

## 2025-07-18 PROCEDURE — 85027 COMPLETE CBC AUTOMATED: CPT | Mod: HCNC | Performed by: INTERNAL MEDICINE

## 2025-07-18 PROCEDURE — 83735 ASSAY OF MAGNESIUM: CPT | Mod: HCNC | Performed by: HOSPITALIST

## 2025-07-18 RX ORDER — METHOCARBAMOL 500 MG/1
500 TABLET, FILM COATED ORAL ONCE
Status: COMPLETED | OUTPATIENT
Start: 2025-07-18 | End: 2025-07-18

## 2025-07-18 RX ORDER — ACETAMINOPHEN 500 MG
1000 TABLET ORAL 3 TIMES DAILY
Status: DISCONTINUED | OUTPATIENT
Start: 2025-07-18 | End: 2025-07-22 | Stop reason: HOSPADM

## 2025-07-18 RX ORDER — LIDOCAINE 50 MG/G
1 PATCH TOPICAL DAILY
Status: DISCONTINUED | OUTPATIENT
Start: 2025-07-18 | End: 2025-07-22 | Stop reason: HOSPADM

## 2025-07-18 RX ORDER — LANOLIN ALCOHOL/MO/W.PET/CERES
400 CREAM (GRAM) TOPICAL ONCE
Status: COMPLETED | OUTPATIENT
Start: 2025-07-18 | End: 2025-07-18

## 2025-07-18 RX ORDER — DICLOFENAC SODIUM 10 MG/G
2 GEL TOPICAL
Status: DISCONTINUED | OUTPATIENT
Start: 2025-07-18 | End: 2025-07-22 | Stop reason: HOSPADM

## 2025-07-18 RX ORDER — FUROSEMIDE 10 MG/ML
80 INJECTION INTRAMUSCULAR; INTRAVENOUS ONCE
Status: COMPLETED | OUTPATIENT
Start: 2025-07-18 | End: 2025-07-18

## 2025-07-18 RX ADMIN — ACETAMINOPHEN 650 MG: 325 TABLET ORAL at 02:07

## 2025-07-18 RX ADMIN — HEPARIN SODIUM 5000 UNITS: 5000 INJECTION INTRAVENOUS; SUBCUTANEOUS at 02:07

## 2025-07-18 RX ADMIN — Medication 6 MG: at 09:07

## 2025-07-18 RX ADMIN — Medication 400 MG: at 10:07

## 2025-07-18 RX ADMIN — FUROSEMIDE 80 MG: 10 INJECTION, SOLUTION INTRAVENOUS at 05:07

## 2025-07-18 RX ADMIN — METHOCARBAMOL 500 MG: 500 TABLET ORAL at 10:07

## 2025-07-18 RX ADMIN — PRAVASTATIN SODIUM 40 MG: 20 TABLET ORAL at 08:07

## 2025-07-18 RX ADMIN — ASPIRIN 81 MG: 81 TABLET, COATED ORAL at 08:07

## 2025-07-18 RX ADMIN — ACETAMINOPHEN 1000 MG: 500 TABLET ORAL at 08:07

## 2025-07-18 RX ADMIN — MUSCLE RUB CREAM: 100; 150 CREAM TOPICAL at 11:07

## 2025-07-18 RX ADMIN — HEPARIN SODIUM 5000 UNITS: 5000 INJECTION INTRAVENOUS; SUBCUTANEOUS at 05:07

## 2025-07-18 RX ADMIN — LIDOCAINE 1 PATCH: 50 PATCH CUTANEOUS at 05:07

## 2025-07-18 RX ADMIN — FUROSEMIDE 40 MG: 10 INJECTION, SOLUTION INTRAVENOUS at 08:07

## 2025-07-18 RX ADMIN — HEPARIN SODIUM 5000 UNITS: 5000 INJECTION INTRAVENOUS; SUBCUTANEOUS at 09:07

## 2025-07-18 RX ADMIN — ACETAMINOPHEN 1000 MG: 500 TABLET ORAL at 05:07

## 2025-07-18 NOTE — NURSING
Patient arrived to the floor via stretcher. Patient ambulated with assistance to the bed. VSS and no signs of distress. Bedside commode placed at the bedside with hat inside for strict Is and Os. Patient voices no concerns or complaints.

## 2025-07-18 NOTE — ASSESSMENT & PLAN NOTE
Hyperkalemia is likely due to Medication induced.The patients most recent potassium results are listed below.  Recent Labs     07/17/25  1329   K 5.6*     Plan  - Monitor for arrhythmias with EKG and/or continuous telemetry.   - Treat the hyperkalemia with Potassium Binders and Furosemide.   - Monitor potassium: Every 12 hours  - The patient's hyperkalemia is stable

## 2025-07-18 NOTE — ASSESSMENT & PLAN NOTE
Hyponatremia is likely due to Heart Failure. The patient's most recent sodium results are listed below.  Recent Labs     07/17/25  1329   *     Plan  - Correct the sodium by 4-6mEq in 24 hours.   - Obtain the following studies: Urine sodium, urine osmolality, serum osmolality.  - Will treat the hyponatremia -  Fluid restriction of:  1.2 liter per day  - Monitor sodium Daily.   - Patient hyponatremia is stable  - Check BMP now

## 2025-07-18 NOTE — ASSESSMENT & PLAN NOTE
Patient's blood pressure range in the last 24 hours was: BP  Min: 99/61  Max: 160/70.The patient's inpatient anti-hypertensive regimen is listed below:  Current Antihypertensives  furosemide injection 40 mg, Daily, Intravenous  Not on any BP meds  Plan  - BP is controlled, no changes needed to their regimen

## 2025-07-18 NOTE — PLAN OF CARE
Inpatient Upgrade Note    Jenniffer Jimenez has warranted treatment spanning two or more midnights of hospital level care for the management of heart failure and falls/ tremors . She continues to require IV diuresis, daily labs, further testing/imaging, monitoring of vital signs, and further evaluation by consultants. Her condition is also complicated by the following comorbidities: Coronary Artery Disease, Hypertension, Diabetes, and Chronic kidney disease.

## 2025-07-18 NOTE — ASSESSMENT & PLAN NOTE
Likely related to progressive tremors.  Traumatic evaluation showed no trial tip fracture or misalignment.  Neurology consulted. Appreciate recs  PT/OT evaluation.

## 2025-07-18 NOTE — HOSPITAL COURSE
Presented with progressive upper and lower extremity tremors with multiple falls at home. Neurology and PTOT were consulted. MRI brain w/o acute findings; B1 pending. Neuro started sinemet trial with outpatient ramp and Neurology appt scheduled. Patient also found to be in acute CHF and associated hyponatremia, requiring IV Lasix. Na much improved and now euvolemia. Increase home Lasix to 40 mg daily.  DVT u/s ordered for R leg pain - negative. Has CHF clinic, hospital follow up appts scheduled.

## 2025-07-18 NOTE — SUBJECTIVE & OBJECTIVE
"Past Medical History:   Diagnosis Date    Amblyopia of left eye 04/10/2013    Arthritis     Facet arthropathy, Lumbosacral    Atherosclerosis of aorta     Cataract     Central retinal vein occlusion of left eye     CKD (chronic kidney disease) stage 3, GFR 30-59 ml/min     Diabetes mellitus, type 2     Diabetic polyneuropathy 2022    Exotropia of both eyes 2013    recession RSR 5.0mm w/ adj; recession LR os 5.0 w/ adj; resect MR os  4.0mm    Hearing loss     History of resection of small bowel     Hypertensive retinopathy of both eyes     Hypoglycemia     Macular degeneration     OA (osteoarthritis) of shoulder     Right    Osteoporosis     Posterior vitreous detachment of both eyes     Psychiatric problem     Rhinitis     TIA (transient ischemic attack)        Past Surgical History:   Procedure Laterality Date    APPENDECTOMY      CARDIAC CATHETERIZATION      CATARACT EXTRACTION W/  INTRAOCULAR LENS IMPLANT Bilateral      SECTION, CLASSIC      CLOSURE OF LEFT ATRIAL APPENDAGE USING DEVICE N/A 2020    Procedure: Left atrial appendage closure device;  Surgeon: Abundio Curtis MD;  Location: [facility] CATH LAB;  Service: Cardiology;  Laterality: N/A;    HYSTERECTOMY      INNER EAR SURGERY      IRRIGATION AND DEBRIDEMENT OF UPPER EXTREMITY Right 2024    Procedure: IRRIGATION AND DEBRIDEMENT, UPPER EXTREMITY;  Surgeon: Con Moran Jr., MD;  Location: [facility];  Service: Orthopedics;  Laterality: Right;    JOINT REPLACEMENT      LEFT KNEE REPLACEMENT IN  -    OOPHORECTOMY      SINUS SURGERY      STRABISMUS SURGERY  13    RSR recession 5 mm, LLR recession 5 mm and LMR resection 4mm    STRABISMUS SURGERY  2014    recess LR OD 6mm    TONSILLECTOMY      watchman surgery N/A 2020       Review of patient's allergies indicates:   Allergen Reactions    Opioids - morphine analogues Other (See Comments)     Bowel issues; bowel obstruction    Tizanidine Other (See Comments)     "Lips " "were numb, almost passed out."    Tramadol Hallucinations    Morphine     Opioids-meperidine and related     Ciprofloxacin Rash       Current Neurological Medications: see below    No current facility-administered medications on file prior to encounter.     Current Outpatient Medications on File Prior to Encounter   Medication Sig    aspirin (ECOTRIN) 81 MG EC tablet Take 81 mg by mouth once daily.    estradioL (ESTRACE) 0.01 % (0.1 mg/gram) vaginal cream Place 1 g vaginally 3 (three) times a week.    furosemide (LASIX) 20 MG tablet Take 2 tablets in the a.m. for weights 155-159, take 2 tablets twice a day for weights 160 over, none for weights less than 155    pravastatin (PRAVACHOL) 40 MG tablet Take 1 tablet (40 mg total) by mouth once daily.    psyllium 0.52 gram capsule Take 3 capsules by mouth 3 (three) times daily as needed (constipation).    spironolactone (ALDACTONE) 25 MG tablet Take 1 tablet (25 mg total) by mouth 2 (two) times daily.     Family History       Problem Relation (Age of Onset)    Breast cancer Maternal Aunt    Diabetes Sister, Brother    Heart disease Sister, Sister    Hypertension Mother, Father    Liver disease Sister    No Known Problems Maternal Uncle, Paternal Aunt, Paternal Uncle, Maternal Grandmother, Maternal Grandfather, Paternal Grandmother, Paternal Grandfather, Daughter, Son, Son, Son          Tobacco Use    Smoking status: Former     Current packs/day: 0.00     Types: Cigarettes     Quit date: 10/29/1982     Years since quittin.7     Passive exposure: Never    Smokeless tobacco: Never   Substance and Sexual Activity    Alcohol use: Yes     Alcohol/week: 2.0 standard drinks of alcohol     Types: 2 Shots of liquor per week     Comment: less than once every 6 months    Drug use: No    Sexual activity: Yes     Review of Systems   Neurological:  Positive for tremors.     Objective:     Vital Signs (Most Recent):  Temp: 97.8 °F (36.6 °C) (25 1100)  Pulse: 67 (25 " 1400)  Resp: 19 (07/18/25 1400)  BP: 126/61 (07/18/25 1500)  SpO2: 100 % (07/18/25 1500) Vital Signs (24h Range):  Temp:  [96.8 °F (36 °C)-98.2 °F (36.8 °C)] 97.8 °F (36.6 °C)  Pulse:  [53-77] 67  Resp:  [14-21] 19  SpO2:  [86 %-100 %] 100 %  BP: ()/(51-89) 126/61     Weight: 74.8 kg (165 lb)  Body mass index is 27.46 kg/m².     Physical Exam  Vitals and nursing note reviewed.   Constitutional:       Appearance: She is ill-appearing.   HENT:      Head: Normocephalic and atraumatic.   Cardiovascular:      Rate and Rhythm: Normal rate and regular rhythm.   Pulmonary:      Effort: Pulmonary effort is normal.   Skin:     General: Skin is warm and dry.   Neurological:      Comments: Alert. Very hard of hearing. Oriented to person and hospital.    CNs grossly intact.    Strength: 4+/5 throughout the RUE, 4-/5 throughout the LUE (patient cites prior rotator cuff tear). 4/5 BL LEs.    DTRs: 2+ in the UEs. 1+ throughout the BL LEs.    Coordination: questioned apparent tremor in BL UE with intent/posture, but not necessarily convincing that this is out of proportion to weakness. Not evident in the BL LEs, but gait deferred.     No hypomimia, bradyphrenia.               Significant Labs: All pertinent lab results from the past 24 hours have been reviewed.    Significant Imaging: I have reviewed all pertinent imaging results/findings within the past 24 hours.

## 2025-07-18 NOTE — ED NOTES
Pt. Rounded on. Pt. Resting comfortably in bed with both side rails up. Pt. Has call bell and all belongings in reach at bedside. Patient asked if they needed to go to the restroom. Patient updated on treatment plan, patient verbalized understanding. Pt. Remains hooked up to continuous BP, pulse ox, and Cardiac monitoring at this time. Pt. Progress ongoing.

## 2025-07-18 NOTE — ASSESSMENT & PLAN NOTE
Reports worsening tremors initially involving lower extremities and now affecting the upper extremities.  Likely etiology of new falls  Will consult neurology inpatient to evaluate for movement disorder since she has missed prior neurology appt.  May be a candidate for a dopaminergic agent trial.  Neurology consulted. Appreciate recs

## 2025-07-18 NOTE — CONSULTS
"[facility] - Emergency Dept  Neurology  Consult Note    Patient Name: Jenniffer Jimenez  MRN: [id number]  Admission Date: 7/17/2025  Hospital Length of Stay: 0 days  Code Status: Full Code   Attending Provider: Nely Chahal MD   Consulting Provider: Mayito Crowley DO  Primary Care Physician: Reed Ruiz MD  Principal Problem:Acute on chronic heart failure with preserved ejection fraction    Inpatient consult to General Neurology  Consult performed by: Mayito Crowley DO  Consult ordered by: Nely Chahal MD         Subjective:     Chief Complaint:  tremor     HPI:   89F. Hx. Afib s/p watchman on ASA, CKD, HTN, DM2. Px. for tremor and associated falls. She notes onset of tremor several months ago: began in the lower extremities. A "few days to weeks ago," she notes that the tremor began to involve her upper extremities. It is most notable when she is standing, or engaged in activity: i.e. postural and/or with intent. The tremor in the legs has lead to multiple falls, including two in the last two days. She denies any family history of tremor or alcohol use, denies any significant personal history of alcohol use. Since admission, AFVSS, labs notable for hyponatremia to 128 and hyperkalemia to 5.6, elevated BUN/CR (~baseline, c/w CKD). XR hips and LE w/o evidence of fracture. CT spine with degenerative disease, lumbar > cervical. CTH without acute findings. Neurology consulted for opinion on tremor.     Past Medical History:   Diagnosis Date    Amblyopia of left eye 04/10/2013    Arthritis     Facet arthropathy, Lumbosacral    Atherosclerosis of aorta     Cataract     Central retinal vein occlusion of left eye     CKD (chronic kidney disease) stage 3, GFR 30-59 ml/min     Diabetes mellitus, type 2     Diabetic polyneuropathy 01/06/2022    Exotropia of both eyes 02/06/2013    recession RSR 5.0mm w/ adj; recession LR os 5.0 w/ adj; resect MR os  4.0mm    Hearing loss     History of resection of small bowel     Hypertensive " "retinopathy of both eyes     Hypoglycemia     Macular degeneration     OA (osteoarthritis) of shoulder     Right    Osteoporosis     Posterior vitreous detachment of both eyes     Psychiatric problem     Rhinitis     TIA (transient ischemic attack)        Past Surgical History:   Procedure Laterality Date    APPENDECTOMY      CARDIAC CATHETERIZATION      CATARACT EXTRACTION W/  INTRAOCULAR LENS IMPLANT Bilateral      SECTION, CLASSIC      CLOSURE OF LEFT ATRIAL APPENDAGE USING DEVICE N/A 2020    Procedure: Left atrial appendage closure device;  Surgeon: Abundio Curtis MD;  Location: [facility] CATH LAB;  Service: Cardiology;  Laterality: N/A;    HYSTERECTOMY      INNER EAR SURGERY      IRRIGATION AND DEBRIDEMENT OF UPPER EXTREMITY Right 2024    Procedure: IRRIGATION AND DEBRIDEMENT, UPPER EXTREMITY;  Surgeon: Con Moran Jr., MD;  Location: [facility] OR;  Service: Orthopedics;  Laterality: Right;    JOINT REPLACEMENT      LEFT KNEE REPLACEMENT IN 2013 -    OOPHORECTOMY      SINUS SURGERY      STRABISMUS SURGERY  13    RSR recession 5 mm, LLR recession 5 mm and LMR resection 4mm    STRABISMUS SURGERY  2014    recess LR OD 6mm    TONSILLECTOMY      watchman surgery N/A 2020       Review of patient's allergies indicates:   Allergen Reactions    Opioids - morphine analogues Other (See Comments)     Bowel issues; bowel obstruction    Tizanidine Other (See Comments)     "Lips were numb, almost passed out."    Tramadol Hallucinations    Morphine     Opioids-meperidine and related     Ciprofloxacin Rash       Current Neurological Medications: see below    No current facility-administered medications on file prior to encounter.     Current Outpatient Medications on File Prior to Encounter   Medication Sig    aspirin (ECOTRIN) 81 MG EC tablet Take 81 mg by mouth once daily.    estradioL (ESTRACE) 0.01 % (0.1 mg/gram) vaginal cream Place 1 g vaginally 3 (three) times a week.    furosemide (LASIX) " 20 MG tablet Take 2 tablets in the a.m. for weights 155-159, take 2 tablets twice a day for weights 160 over, none for weights less than 155    pravastatin (PRAVACHOL) 40 MG tablet Take 1 tablet (40 mg total) by mouth once daily.    psyllium 0.52 gram capsule Take 3 capsules by mouth 3 (three) times daily as needed (constipation).    spironolactone (ALDACTONE) 25 MG tablet Take 1 tablet (25 mg total) by mouth 2 (two) times daily.     Family History       Problem Relation (Age of Onset)    Breast cancer Maternal Aunt    Diabetes Sister, Brother    Heart disease Sister, Sister    Hypertension Mother, Father    Liver disease Sister    No Known Problems Maternal Uncle, Paternal Aunt, Paternal Uncle, Maternal Grandmother, Maternal Grandfather, Paternal Grandmother, Paternal Grandfather, Daughter, Son, Son, Son          Tobacco Use    Smoking status: Former     Current packs/day: 0.00     Types: Cigarettes     Quit date: 10/29/1982     Years since quittin.7     Passive exposure: Never    Smokeless tobacco: Never   Substance and Sexual Activity    Alcohol use: Yes     Alcohol/week: 2.0 standard drinks of alcohol     Types: 2 Shots of liquor per week     Comment: less than once every 6 months    Drug use: No    Sexual activity: Yes     Review of Systems   Neurological:  Positive for tremors.     Objective:     Vital Signs (Most Recent):  Temp: 97.8 °F (36.6 °C) (25 1100)  Pulse: 67 (25 1400)  Resp: 19 (25 1400)  BP: 126/61 (25 1500)  SpO2: 100 % (25 1500) Vital Signs (24h Range):  Temp:  [96.8 °F (36 °C)-98.2 °F (36.8 °C)] 97.8 °F (36.6 °C)  Pulse:  [53-77] 67  Resp:  [14-21] 19  SpO2:  [86 %-100 %] 100 %  BP: ()/(51-89) 126/61     Weight: 74.8 kg (165 lb)  Body mass index is 27.46 kg/m².     Physical Exam  Vitals and nursing note reviewed.   Constitutional:       Appearance: She is ill-appearing.   HENT:      Head: Normocephalic and atraumatic.   Cardiovascular:      Rate and  "Rhythm: Normal rate and regular rhythm.   Pulmonary:      Effort: Pulmonary effort is normal.   Skin:     General: Skin is warm and dry.   Neurological:      Comments: Alert. Very hard of hearing. Oriented to person and hospital.    CNs grossly intact.    Strength: 4+/5 throughout the RUE, 4-/5 throughout the LUE (patient cites prior rotator cuff tear). 4/5 BL LEs.    DTRs: 2+ in the UEs. 1+ throughout the BL LEs.    Coordination: questioned apparent tremor in BL UE with intent/posture, but not necessarily convincing that this is out of proportion to weakness. Not evident in the BL LEs, but gait deferred.     No hypomimia, bradyphrenia.               Significant Labs: All pertinent lab results from the past 24 hours have been reviewed.    Significant Imaging: I have reviewed all pertinent imaging results/findings within the past 24 hours.  Assessment and Plan:     Tremor of unknown origin  89F. Hx. Afib s/p watchman on ASA, CKD, HTN, DM2. Px. for tremor and associated falls. She notes onset of tremor several months ago: began in the lower extremities. A "few days to weeks ago," she notes that the tremor began to involve her upper extremities. It is most notable when she is standing, or engaged in activity: i.e. postural and/or with intent. The tremor in the legs has lead to multiple falls, including two in the last two days. She denies any family history of tremor or alcohol use, denies any significant personal history of alcohol use. Since admission, AFVSS, labs notable for hyponatremia to 128 and hyperkalemia to 5.6, elevated BUN/CR (~baseline, c/w CKD). XR hips and LE w/o evidence of fracture. CT spine with degenerative disease, lumbar > cervical. CTH without acute findings. Neurology consulted for opinion on tremor.    Understand concern for tremor with intent/posture: though this phenomenon is visible in the BL UEs, it may in fact not be out of proportion to weakness/deconditioning. Not compelling for ET/rubral " termor. Neither is parkinsonian, but important to note that vascular parkinsonism moreso tends to affect the lower extremities: gait testing deferred today. Metabolic disturbances such as hyponetremia, hyperkalemia, elevated BUN/Cr may contribute to tremor/weakness. With her risk factors (and more disabling symptoms in the lower extremities), would investigate vascular causes first with MRI brain w/o contrast.     Recommendations  -MRI brain w/o contrast  -Nutritional studies if not done recently: TSH, B1, B9, B12/MMA  -continue to address toxic/metabolic issues  -We will follow  -Contact us with questions        VTE Risk Mitigation (From admission, onward)           Ordered     heparin (porcine) injection 5,000 Units  Every 8 hours         07/17/25 1818     IP VTE HIGH RISK PATIENT  Once         07/17/25 1818     Place sequential compression device  Until discontinued         07/17/25 1818                    Thank you for your consult. I will follow-up with patient. Please contact us if you have any additional questions.    Mayito Crowley, DO  Neurology  [facility] - Emergency Dept

## 2025-07-18 NOTE — ASSESSMENT & PLAN NOTE
Patient's blood pressure range in the last 24 hours was: BP  Min: 124/84  Max: 148/67.The patient's inpatient anti-hypertensive regimen is listed below:  Current Antihypertensives  furosemide injection 40 mg, Daily, Intravenous  Not on any BP meds  Plan  - BP is controlled, no changes needed to their regimen

## 2025-07-18 NOTE — PT/OT/SLP EVAL
"Physical Therapy Evaluation and Treatment    Patient Name: Jenniffer Jimenez   MRN: [id number]  Recent Surgery: * No surgery found *      Recommendations:     Discharge Recommendations: Low Intensity Therapy   Discharge Equipment Recommendations: walker, rolling, bedside commode   Barriers to discharge: Increased level of assist, Decreased caregiver support, and Fall Risk     Assessment:     Jenniffer Jimenez is a 89 y.o. female admitted with a medical diagnosis of Ambulatory dysfunction. She presents with the following impairments/functional limitations: weakness, impaired endurance, impaired sensation, impaired functional mobility, gait instability, impaired balance, decreased lower extremity function, pain, impaired cardiopulmonary response to activity. Pt was A + O x 4 at start of session and agreeable to PT evaluation. She is pleasant and eager to participate & ambulate. She demonstrates functional strength; however, demonstrates unsteady and uncoordinated gait with RW and with Rollator. Ambulation limited by shortness of breath which has been occurring intermittently for the last month. Patient currently demonstrates a need for low intensity therapy on a scheduled basis secondary to a decline in functional status due to injury.       Rehab Prognosis: Good; patient would benefit from acute PT services to address these deficits and reach maximum level of function.    Plan:     During this hospitalization, patient to be seen 4 x/week to address the above listed problems via gait training, therapeutic activities, therapeutic exercises, neuromuscular re-education.     Plan of Care Expires: 07/25/25    Subjective     Chief Complaint: Hip pain in supine or long sit  Patient Comments/Goals: "Why do I have these tremors? Why does my R hip hurt?" Daughter present and expressed frustration with patient and her frequent UTIs. Reports she encourages patient to drink excessive water to prevent UTIs.    Pain/Comfort:  Pain " "Rating 1: 0/10  Pain Rating Post-Intervention 1: 0/10 (at EOB pt reports no pain; however, reports 10/10 hip pain in longsit/supine)    Social History:  Living Environment: Patient lives alone in a fifth floor apartment with ramped, elevator, walk-in shower, grab bars, and built-in shower chair  Prior Level of Function: Prior to admission, patient was modified independent, uses rollator for amb at baseline, recently needing more rest breaks.   Equipment Used at Home: rollator, shower chair  DME owned (not currently used): rolling walker   Assistance Upon Discharge: unknown, daughter reports that she is "unreliable" and pt "doesn't want to rely on her anyway"     Objective:     Communicated with RN prior to session. Patient found sitting edge of bed with telemetry, pulse ox (continuous), blood pressure cuff upon PT entry to room.    General Precautions: Standard, fall, hearing impaired   Orthopedic Precautions: N/A   Braces: N/A    Respiratory Status: Room air    Exams:  Cognitive Exam: Patient is oriented to Person, Place, Time, Situation, follows commands 100% of the time  RLE ROM: WFL  RLE Strength: WFL hip flexor at least 3/5 , resistance not applied 2/2 hip pain  LLE ROM: WFL  LLE Strength: WFL  Sensation: intact except diminished light/touch from R mid calf down, pt still able to discern light touch but reports numbness.   Coordination: Heel to Shin: intact bilaterally and Alternating Dorsiflexion/Plantarflexion: intact bilaterally  Tone: normal    Functional Mobility:  Gait belt applied - Yes  Bed Mobility  Seated at EOB at start of session and returned to EOB  Transfers  Sit to Stand: contact guard assistance   Two trials with rolling walker  First trial: Pt initiated however, Min A provided to complete end of transfer  Second trial: CGA with RW, improved from previous trial   One trial with rollator- CGA. Pt did not lock the brakes.   Gait  Patient ambulated 15 ft with rolling walker and minimum assistance " "with periods of mod A during loss of balance.   Patient demonstrates unsteady and uncoordinated gait, decreased step length, wide base of support, shuffle gait.   Patient expressed shortness of breath and fatigue once trial was completed and she returned to sitting. Pulse Ox initially not reading accurately but once able to obtain was noted to be in upper 80s, approximately 1 min after sitting. Pt was then instructed on deep, diaphragmatic breathing.   All lines remained intact throughout ambulation trail.  Patient ambulated 10 ft with rollator and minimum assistance. Pt kindly stated "get outta my way." when PT provided assistance. Minimal assistance with hand on gait belt was still provided throughout trial.   Patient demonstrates unsteady gait, decreased step length, decreased foot clearance, shuffle gait, and increased gait speed. All lines remained intact throughout ambulation trail.  Patient expressed shortness of breath and fatigue once trial was completed and she returned to sitting. Pulse Ox initially not reading accurately but once able to obtain was noted to be in upper 80s, approximately 1 min after sitting.  Pt was then instructed on deep, diaphragmatic breathing.   All lines remained intact throughout ambulation trail.  Balance:  Static Sitting: stand by assistance at EOB x 5 minutes  Dynamic Sitting: stand by assistance at EOB x 6 minutes  Static Standing: contact guard assistance with rolling walker for one trial and rollator for one trial  Dynamic Standing: see gait     Therapeutic Activities and Exercises:  Patient educated on role of acute care PT and PT POC.  Educated about diaphragmatic breathing technique and cued for use with mobility.  Directed patient to appropriate healthcare providers for various questions (UTIs, supplements, hip pain, tremors etc.)    AM-PAC 6 CLICK MOBILITY  Turning over in bed (including adjusting bedclothes, sheets and blankets)?: 3  Sitting down on and standing up " from a chair with arms (e.g., wheelchair, bedside commode, etc.): 3  Moving from lying on back to sitting on the side of the bed?: 3  Moving to and from a bed to a chair (including a wheelchair)?: 3  Need to walk in hospital room?: 3  Climbing 3-5 steps with a railing?: 1  Basic Mobility Total Score: 16     Patient left sitting edge of bed with all lines intact, call button in reach, and RN and family present.    GOALS:   Multidisciplinary Problems       Physical Therapy Goals          Problem: Physical Therapy    Goal Priority Disciplines Outcome Interventions   Physical Therapy Goal     PT, PT/OT Progressing    Description: Goals to be met by: 25     Patient will increase functional independence with mobility by performin. Supine to sit with Stand-by Assistance  2. Sit to supine with Stand-by Assistance  3. Sit to stand transfer with Stand-by Assistance with LRAD.   4. Gait  x 100 feet with Stand-by Assistance using LRAD.                          DME Justifications:   Jenniffer requires a commode for home use because she is confined to a single room.   Jenniffer's mobility limitation cannot be sufficiently resolved by the use of a cane. Her functional mobility deficit can be sufficiently resolved with the use of a Rolling Walker. Patient's mobility limitation significantly impairs their ability to participate in one of more activities of daily living.  The use of a RW will significantly improve the patient's ability to participate in MRADLS and the patient will use it on regular basis in the home.    History:     Past Medical History:   Diagnosis Date    Amblyopia of left eye 04/10/2013    Arthritis     Facet arthropathy, Lumbosacral    Atherosclerosis of aorta     Cataract     Central retinal vein occlusion of left eye     CKD (chronic kidney disease) stage 3, GFR 30-59 ml/min     Diabetes mellitus, type 2     Diabetic polyneuropathy 2022    Exotropia of both eyes 2013    recession RSR 5.0mm w/ adj;  recession LR os 5.0 w/ adj; resect MR os  4.0mm    Hearing loss     History of resection of small bowel     Hypertensive retinopathy of both eyes     Hypoglycemia     Macular degeneration     OA (osteoarthritis) of shoulder     Right    Osteoporosis     Posterior vitreous detachment of both eyes     Psychiatric problem     Rhinitis     TIA (transient ischemic attack)        Past Surgical History:   Procedure Laterality Date    APPENDECTOMY      CARDIAC CATHETERIZATION      CATARACT EXTRACTION W/  INTRAOCULAR LENS IMPLANT Bilateral      SECTION, CLASSIC      CLOSURE OF LEFT ATRIAL APPENDAGE USING DEVICE N/A 2020    Procedure: Left atrial appendage closure device;  Surgeon: Abundio Curtis MD;  Location: [facility] CATH LAB;  Service: Cardiology;  Laterality: N/A;    HYSTERECTOMY      INNER EAR SURGERY      IRRIGATION AND DEBRIDEMENT OF UPPER EXTREMITY Right 2024    Procedure: IRRIGATION AND DEBRIDEMENT, UPPER EXTREMITY;  Surgeon: Con Moran Jr., MD;  Location: [facility] OR;  Service: Orthopedics;  Laterality: Right;    JOINT REPLACEMENT      LEFT KNEE REPLACEMENT IN 2013 -    OOPHORECTOMY      SINUS SURGERY      STRABISMUS SURGERY  13    RSR recession 5 mm, LLR recession 5 mm and LMR resection 4mm    STRABISMUS SURGERY  2014    recess LR OD 6mm    TONSILLECTOMY      watchman surgery N/A 2020       Time Tracking:     PT Received On: 25  PT Start Time: 0850  PT Stop Time: 0941  PT Total Time (min): 51 min     Billable Minutes: Evaluation 10, Gait Training 30, and Therapeutic Activity 11    2025

## 2025-07-18 NOTE — PLAN OF CARE
PT Eval complete, appropriate goals created.     Problem: Physical Therapy  Goal: Physical Therapy Goal  Description: Goals to be met by: 25     Patient will increase functional independence with mobility by performin. Supine to sit with Stand-by Assistance  2. Sit to supine with Stand-by Assistance  3. Sit to stand transfer with Stand-by Assistance with LRAD.   4. Gait  x 100 feet with Stand-by Assistance using LRAD.     Outcome: Progressing

## 2025-07-18 NOTE — PHARMACY MED REC
"  Admission Medication History     The home medication history was taken by Kena Wyatt.    You may go to "Admission" then "Reconcile Home Medications" tabs to review and/or act upon these items.     The home medication list has been updated by the Pharmacy department.   Please read ALL comments highlighted in yellow.   Please address this information as you see fit.    Feel free to contact us if you have any questions or require assistance.      The medications listed below were removed from the home medication list. Please reorder if appropriate:  Patient reports no longer taking the following medication(s):  HYDROCORTISONE 2.5 % CREAM  MUPIROCIN 2 % OINTMENT  PROPYLENE GLYCOL(SYSTANE COMPLETE) OPHT SOL    Medications listed below were obtained from: Patient/family and Analytic software- Interventional Imaging  Current Outpatient Medications on File Prior to Encounter   Medication Sig    aspirin (ECOTRIN) 81 MG EC tablet Take 81 mg by mouth once daily.        estradioL (ESTRACE) 0.01 % (0.1 mg/gram) vaginal cream Place 1 g vaginally 3 (three) times a week.      furosemide (LASIX) 20 MG tablet Take 2 tablets in the a.m. for weights 155-159, take 2 tablets twice a day for weights 160 over, none for weights less than 155      pravastatin (PRAVACHOL) 40 MG tablet Take 1 tablet (40 mg total) by mouth once daily.      psyllium 0.52 gram capsule Take 3 capsules by mouth 3 (three) times daily as needed for constipation.      spironolactone (ALDACTONE) 25 MG tablet Take 1 tablet (25 mg total) by mouth 2 (two) times daily.           Potential issues to be addressed PRIOR TO DISCHARGE  Please discuss with the patient barriers to adherence with medication treatment plans  Patient requires education regarding drug therapies     Kena Wyatt  EXT [phone]                .          "

## 2025-07-18 NOTE — ASSESSMENT & PLAN NOTE
Likely related to progressive tremors.  Traumatic evaluation showed no fracture or misalignment.  Neurology consulted  PT/OT evaluation - low intensity, RW, BSC

## 2025-07-18 NOTE — ASSESSMENT & PLAN NOTE
Patient has long standing persistent (>12 months) atrial fibrillation. Patient is currently in atrial fibrillation. [id number] Score: 3. The patients heart rate in the last 24 hours is as follows:  Pulse  Min: 53  Max: 77     Antiarrhythmics       Anticoagulants  heparin (porcine) injection 5,000 Units, Every 8 hours, Subcutaneous    Plan  - Replete lytes with a goal of K>4, Mg >2  - Patient is not anticoagulated due to watchman's device  - Patient's afib is currently controlled

## 2025-07-18 NOTE — ASSESSMENT & PLAN NOTE
Hyperkalemia is likely due to Medication induced.The patients most recent potassium results are listed below.  Recent Labs     07/17/25  1329 07/17/25  2210 07/18/25  0626   K 5.6* 5.0 5.1     Plan  - Monitor for arrhythmias with EKG and/or continuous telemetry.   - Treat the hyperkalemia with Potassium Binders and Furosemide.   - Monitor potassium: Every 12 hours  - The patient's hyperkalemia is resolved

## 2025-07-18 NOTE — H&P
[facility] - Emergency Dept  [facility]  History & Physical    Patient Name: Jenniffer Jimenez  MRN: [id number]  Patient Class: OP- Observation  Admission Date: 7/17/2025  Attending Physician: Nely Chahal MD   Primary Care Provider: Reed Ruiz MD         Patient information was obtained from patient, relative(s), and ER records.     Subjective:     Principal Problem:Ambulatory dysfunction    Chief Complaint:   Chief Complaint   Patient presents with    Fall     Fell 2 days, states hue to shakes, pain to L knee and lower leg        HPI: Ms. Abad is a 89 year female medical history significant for atrial fibrillation status post Watchman, CKD, hypertension, T2DM, and new tremor disorder who presented to the ER following falls at home.  Of note, patient fell at home about 2 days ago.  She had 2 fall episodes in the span of 2 days. She reports that the tremors have worsened  which caused her to fall. She denies head strikes or bleeding from any orifices. '    She endorses left knee pain.  She is seen at bedside with her daughter.  Patient is extremely hard of hearing.  States her major concern is the frequent falls as well as tremors.  She reports that the tremors have worsened.  She is scheduled with neurology on August 12.      In the ER, she was noted to be hemodynamically stable.  CBC revealed hemoglobin of 9, WBC count of 3.9 and platelet count of 118.  CMP with sodium of 128, potassium of 5.6, chloride of 99, bicarb of 20, BUN of 53, creatinine of 1.9 glucose of 17, magnesium of 2.0. Troponin was negative.  BNP of 273. Urinalysis with trace leukocyte esterase and unremarkable microscopy.   Traumatic evaluation with x-ray of the right humerus, chest, bilateral hips, bilateral knees, CT head, CT cervical spine, CT lumbar spine were all negative.    She received lasix 80 mg and lokelma in the ER.        Past Medical History:   Diagnosis Date    Amblyopia of left eye 04/10/2013    Arthritis     Facet  "arthropathy, Lumbosacral    Atherosclerosis of aorta     Cataract     Central retinal vein occlusion of left eye     CKD (chronic kidney disease) stage 3, GFR 30-59 ml/min     Diabetes mellitus, type 2     Diabetic polyneuropathy 2022    Exotropia of both eyes 2013    recession RSR 5.0mm w/ adj; recession LR os 5.0 w/ adj; resect MR os  4.0mm    Hearing loss     History of resection of small bowel     Hypertensive retinopathy of both eyes     Hypoglycemia     Macular degeneration     OA (osteoarthritis) of shoulder     Right    Osteoporosis     Posterior vitreous detachment of both eyes     Psychiatric problem     Rhinitis     TIA (transient ischemic attack)        Past Surgical History:   Procedure Laterality Date    APPENDECTOMY      CARDIAC CATHETERIZATION      CATARACT EXTRACTION W/  INTRAOCULAR LENS IMPLANT Bilateral      SECTION, CLASSIC      CLOSURE OF LEFT ATRIAL APPENDAGE USING DEVICE N/A 2020    Procedure: Left atrial appendage closure device;  Surgeon: Abundio Curtis MD;  Location: [facility] CATH LAB;  Service: Cardiology;  Laterality: N/A;    HYSTERECTOMY      INNER EAR SURGERY      IRRIGATION AND DEBRIDEMENT OF UPPER EXTREMITY Right 2024    Procedure: IRRIGATION AND DEBRIDEMENT, UPPER EXTREMITY;  Surgeon: Con Moran Jr., MD;  Location: [facility];  Service: Orthopedics;  Laterality: Right;    JOINT REPLACEMENT      LEFT KNEE REPLACEMENT IN  -    OOPHORECTOMY      SINUS SURGERY      STRABISMUS SURGERY  13    RSR recession 5 mm, LLR recession 5 mm and LMR resection 4mm    STRABISMUS SURGERY  2014    recess LR OD 6mm    TONSILLECTOMY      watchman surgery N/A 2020       Review of patient's allergies indicates:   Allergen Reactions    Opioids - morphine analogues Other (See Comments)     Bowel issues; bowel obstruction    Tizanidine Other (See Comments)     "Lips were numb, almost passed out."    Tramadol Hallucinations    Morphine     Opioids-meperidine and " related     Ciprofloxacin Rash       No current facility-administered medications on file prior to encounter.     Current Outpatient Medications on File Prior to Encounter   Medication Sig    aspirin (ECOTRIN) 81 MG EC tablet Take 81 mg by mouth once daily.    estradioL (ESTRACE) 0.01 % (0.1 mg/gram) vaginal cream Place 1 g vaginally 3 (three) times a week.    furosemide (LASIX) 20 MG tablet Take 2 tablets in the a.m. for weights 155-159, take 2 tablets twice a day for weights 160 over, none for weights less than 155    hydrocortisone 2.5 % cream Apply topically 2 (two) times daily as needed (itchy rash).    mupirocin (BACTROBAN) 2 % ointment Apply topically 3 (three) times daily.    pravastatin (PRAVACHOL) 40 MG tablet Take 1 tablet (40 mg total) by mouth once daily.    propylene glycol (SYSTANE COMPLETE OPHT) Apply to eye.    psyllium 0.52 gram capsule Take 3 capsules by mouth 3 (three) times daily as needed (constipation).    spironolactone (ALDACTONE) 25 MG tablet Take 1 tablet (25 mg total) by mouth 2 (two) times daily.     Family History       Problem Relation (Age of Onset)    Breast cancer Maternal Aunt    Diabetes Sister, Brother    Heart disease Sister, Sister    Hypertension Mother, Father    Liver disease Sister    No Known Problems Maternal Uncle, Paternal Aunt, Paternal Uncle, Maternal Grandmother, Maternal Grandfather, Paternal Grandmother, Paternal Grandfather, Daughter, Son, Son, Son          Tobacco Use    Smoking status: Former     Current packs/day: 0.00     Types: Cigarettes     Quit date: 10/29/1982     Years since quittin.7     Passive exposure: Never    Smokeless tobacco: Never   Substance and Sexual Activity    Alcohol use: Yes     Alcohol/week: 2.0 standard drinks of alcohol     Types: 2 Shots of liquor per week     Comment: less than once every 6 months    Drug use: No    Sexual activity: Yes     Review of Systems   Constitutional: Negative.  Negative for fatigue and fever.   HENT:  Negative.     Respiratory: Negative.  Negative for choking, wheezing and stridor.    Cardiovascular:  Negative for chest pain and palpitations.   Gastrointestinal: Negative.  Negative for abdominal distention, constipation, diarrhea and nausea.   Genitourinary: Negative.    Musculoskeletal: Negative.    Skin: Negative.    Neurological:  Positive for tremors. Negative for seizures, facial asymmetry and light-headedness.     Objective:     Vital Signs (Most Recent):  Temp: 96.8 °F (36 °C) (07/17/25 1555)  Pulse: 77 (07/17/25 1800)  Resp: 20 (07/17/25 1555)  BP: (!) 134/57 (07/17/25 1555)  SpO2: (!) 86 % (07/17/25 1800) Vital Signs (24h Range):  Temp:  [96.8 °F (36 °C)-98.3 °F (36.8 °C)] 96.8 °F (36 °C)  Pulse:  [66-77] 77  Resp:  [18-20] 20  SpO2:  [86 %-98 %] 86 %  BP: (127-148)/(57-79) 134/57     Weight: 74.8 kg (165 lb)  Body mass index is 27.46 kg/m².     Physical Exam  Vitals and nursing note reviewed.   Constitutional:       General: She is not in acute distress.     Appearance: She is not ill-appearing or toxic-appearing.      Comments: Extremely hard of hearing   HENT:      Head: Normocephalic and atraumatic.   Cardiovascular:      Rate and Rhythm: Normal rate. Rhythm irregular.      Pulses: Normal pulses.      Heart sounds: No murmur heard.  Pulmonary:      Effort: Pulmonary effort is normal. No respiratory distress.      Breath sounds: Normal breath sounds. No wheezing.   Abdominal:      General: Bowel sounds are normal. There is no distension.      Palpations: Abdomen is soft.      Tenderness: There is no abdominal tenderness.   Musculoskeletal:      Right lower leg: No edema.      Left lower leg: No edema.   Skin:     Coloration: Skin is not jaundiced or pale.   Neurological:      Mental Status: She is alert. Mental status is at baseline.                Significant Labs: All pertinent labs within the past 24 hours have been reviewed.  Recent Lab Results         07/17/25  1521   07/17/25  1329         Albumin   4.2       ALP   96       ALT   15       Anion Gap   9       Appearance, UA Clear         PTT   27.5  Comment: Refer to local heparin nomogram for intensity/dose specific therapeutic range.       AST   18       Bacteria, UA Rare         Baso #   0.01       Basophil %   0.3       Bilirubin (UA) Negative         BILIRUBIN TOTAL   0.7  Comment: For infants and newborns, interpretation of results should be based   on gestational age, weight and in agreement with clinical   observations.    Premature Infant recommended reference ranges:   0-24 hours:  <8.0 mg/dL   24-48 hours: <12.0 mg/dL   3-5 days:    <15.0 mg/dL   6-29 days:   <15.0 mg/dL       BNP   273  Comment: Values of less than 100 pg/ml are consistent with non-CHF populations.        BUN   53       Calcium   8.7       Chloride   99       CO2   20       Color, UA Yellow         Creatinine   1.9       eGFR   24  Comment: Estimated GFR calculated using the CKD-EPI creatinine (2021) equation.       Eos #   0.01       Eos %   0.3       Glucose   70       Glucose, UA Negative         Gran # (ANC)   3.16       Hematocrit   26.7       Hemoglobin   9.0       Hyaline Casts, UA 1         Immature Grans (Abs)   0.02  Comment: Mild elevation in immature granulocytes is non specific and can be seen in a variety of conditions including stress response, acute inflammation, trauma and pregnancy. Correlation with other laboratory and clinical findings is essential.       Immature Granulocytes   0.5       INR   1.0  Comment: Coumadin Therapy:    2.0 - 3.0 for INR for all indicators except mechanical heart valves    and antiphospholipid syndromes which should use 2.5 - 3.5.       Ketones, UA Negative         Leukocyte Esterase, UA Trace         Lymph #   0.37       Lymph %   9.3       Magnesium    2.0       MCH   29.4       MCHC   33.7       MCV   87       Microscopic Comment   Comment: Other formed elements not mentioned in the report are not present in the microscopic  examination.         Mono #   0.42       Mono %   10.5       MPV   10.3       Neut %   79.1       NITRITE UA Negative         nRBC   0       Blood, UA Negative         pH, UA 5.0         Platelet Count   118       Potassium   5.6  Comment: *No Visible Hemolysis       PROTEIN TOTAL   6.8       Protein, UA Negative  Comment: Recommend a 24 hour urine protein or a urine protein/creatinine ratio if globulin induced proteinuria is clinically suspected.         PT   11.3       RBC   3.06       RBC, UA <1         RDW   16.7       Sodium   128       Spec Grav UA 1.010         Squam Epithel, UA 2         Troponin I High Sensitivity   7       Urobilinogen, UA Negative         WBC, UA 3         WBC   3.99               Significant Imaging: I have reviewed all pertinent imaging results/findings within the past 24 hours.  Assessment/Plan:     Assessment & Plan  Ambulatory dysfunction  Falls  Likely related to progressive tremors.  Traumatic evaluation showed no trial tip fracture or misalignment.  Neurology consulted. Appreciate recs  PT/OT evaluation.        Primary hypertension  Patient's blood pressure range in the last 24 hours was: BP  Min: 124/84  Max: 148/67.The patient's inpatient anti-hypertensive regimen is listed below:  Current Antihypertensives  furosemide injection 40 mg, Daily, Intravenous  Not on any BP meds  Plan  - BP is controlled, no changes needed to their regimen  Chronic a-fib  Patient has long standing persistent (>12 months) atrial fibrillation. Patient is currently in atrial fibrillation. [id number] Score: 3. The patients heart rate in the last 24 hours is as follows:  Pulse  Min: 66  Max: 77     Antiarrhythmics       Anticoagulants  heparin (porcine) injection 5,000 Units, Every 8 hours, Subcutaneous    Plan  - Replete lytes with a goal of K>4, Mg >2  - Patient is not anticoagulated due to watchman's device  - Patient's afib is currently controlled      Hyperkalemia  Hyperkalemia is likely due to  Medication induced.The patients most recent potassium results are listed below.  Recent Labs     07/17/25  1329   K 5.6*     Plan  - Monitor for arrhythmias with EKG and/or continuous telemetry.   - Treat the hyperkalemia with Potassium Binders and Furosemide.   - Monitor potassium: Every 12 hours  - The patient's hyperkalemia is stable          Heart failure with preserved ejection fraction  Endorses SOB on exertion.  Does have mild JVD  Last echo in 2023 - LVEF of 55% with indeterminate diastolic dysfunction  Will give IV lasix.  Fluid restriction < 1.2L daily  Can consider 2D echo for reassessment.    Tremor of unknown origin    Reports worsening tremors initially involving lower extremities and now affecting the upper extremities.  Likely etiology of new falls  Will consult neurology inpatient to evaluate for movement disorder since she has missed prior neurology appt.  May be a candidate for a dopaminergic agent trial.  Neurology consulted. Appreciate recs  Hyponatremia  Hyponatremia is likely due to Heart Failure. The patient's most recent sodium results are listed below.  Recent Labs     07/17/25  1329   *     Plan  - Correct the sodium by 4-6mEq in 24 hours.   - Obtain the following studies: Urine sodium, urine osmolality, serum osmolality.  - Will treat the hyponatremia -  Fluid restriction of:  1.2 liter per day  - Monitor sodium Daily.   - Patient hyponatremia is stable  - Check BMP now  VTE Risk Mitigation (From admission, onward)           Ordered     heparin (porcine) injection 5,000 Units  Every 8 hours         07/17/25 1818     IP VTE HIGH RISK PATIENT  Once         07/17/25 1818     Place sequential compression device  Until discontinued         07/17/25 1818                    SDOH Screening:  The patient was screened for utility difficulties, food insecurity, transport difficulties, housing insecurity, and interpersonal safety and there were no concerns identified this admission.         On  07/17/2025, patient should be placed in hospital observation services under my care.             Marquise Scruggs MD  Department of Hospital Medicine  [facility] - Emergency Dept

## 2025-07-18 NOTE — ED NOTES
Pt remains  on cardiac monitor, continuous pulse ox, cycling blood pressures. Side rails up x2, call bell in reach, bed in low position with brake engaged. AAOx4 , skin w/d, resp wnl.  IV site asymptomatic, on tele , Instructed to shame[place], for assistance to bedside commode.  Both hearing aides at bedside on  at this time.  Aware of I and O.     LOC: The patient is awake and alert; oriented x 3 and speaking appropriately.  APPEARANCE: Patient resting comfortably, patient is clean and well groomed  SKIN: warm and dry, normal skin turgor & moist mucus membranes, skin intact, no breakdown noted.Bruising  on rt upper arm  under BP cuff. Dark skin on both  forearms. Skin tear left arm healed/ scabbed over  MUSCULOSKELETAL: Patient moving all extremities well, no obvious swelling or deformities noted, requires assistance when ambulating  RESPIRATORY: Airway is open and patent, respirations are spontaneous, normal effort and rate, SOB w3/ exertion noted.   CARDIAC: Patient has a normal rate,  peripheral edema noted to lower extremities, capillary refill < 3 seconds; No complaints of chest pain   ABDOMEN: Soft and non tender to palpation, no distention noted. BS commode  in use.

## 2025-07-18 NOTE — ASSESSMENT & PLAN NOTE
"89F. Hx. Afib s/p watchman on ASA, CKD, HTN, DM2. Px. for tremor and associated falls. She notes onset of tremor several months ago: began in the lower extremities. A "few days to weeks ago," she notes that the tremor began to involve her upper extremities. It is most notable when she is standing, or engaged in activity: i.e. postural and/or with intent. The tremor in the legs has lead to multiple falls, including two in the last two days. She denies any family history of tremor or alcohol use, denies any significant personal history of alcohol use. Since admission, AFVSS, labs notable for hyponatremia to 128 and hyperkalemia to 5.6, elevated BUN/CR (~baseline, c/w CKD). XR hips and LE w/o evidence of fracture. CT spine with degenerative disease, lumbar > cervical. CTH without acute findings. Neurology consulted for opinion on tremor.    Understand concern for tremor with intent/posture: though this phenomenon is visible in the BL UEs, it may in fact not be out of proportion to weakness/deconditioning. Not compelling for ET/rubral termor. Neither is parkinsonian, but important to note that vascular parkinsonism moreso tends to affect the lower extremities: gait testing deferred today. Metabolic disturbances such as hyponetremia, hyperkalemia, elevated BUN/Cr may contribute to tremor/weakness. With her risk factors (and more disabling symptoms in the lower extremities), would investigate vascular causes first with MRI brain w/o contrast.     Recommendations  -MRI brain w/o contrast  -Nutritional studies if not done recently: TSH, B1, B9, B12/MMA  -continue to address toxic/metabolic issues  -We will follow  -Contact us with questions  "

## 2025-07-18 NOTE — HPI
"89F. Hx. Afib s/p watchman on ASA, CKD, HTN, DM2. Px. for tremor and associated falls. She notes onset of tremor several months ago: began in the lower extremities. A "few days to weeks ago," she notes that the tremor began to involve her upper extremities. It is most notable when she is standing, or engaged in activity: i.e. postural and/or with intent. The tremor in the legs has lead to multiple falls, including two in the last two days. She denies any family history of tremor or alcohol use, denies any significant personal history of alcohol use. Since admission, AFVSS, labs notable for hyponatremia to 128 and hyperkalemia to 5.6, elevated BUN/CR (~baseline, c/w CKD). XR hips and LE w/o evidence of fracture. CT spine with degenerative disease, lumbar > cervical. CTH without acute findings. Neurology consulted for opinion on tremor.  "
Ms. Abad is a 89 year female medical history significant for atrial fibrillation status post Watchman, CKD, hypertension, T2DM, and new tremor disorder who presented to the ER following falls at home.  Of note, patient fell at home about 2 days ago.  She had 2 fall episodes in the span of 2 days. She reports that the tremors have worsened  which caused her to fall. She denies head strikes or bleeding from any orifices. '    She endorses left knee pain.  She is seen at bedside with her daughter.  Patient is extremely hard of hearing.  States her major concern is the frequent falls as well as tremors.  She reports that the tremors have worsened.  She is scheduled with neurology on August 12.      In the ER, she was noted to be hemodynamically stable.  CBC revealed hemoglobin of 9, WBC count of 3.9 and platelet count of 118.  CMP with sodium of 128, potassium of 5.6, chloride of 99, bicarb of 20, BUN of 53, creatinine of 1.9 glucose of 17, magnesium of 2.0. Troponin was negative.  BNP of 273. Urinalysis with trace leukocyte esterase and unremarkable microscopy.   Traumatic evaluation with x-ray of the right humerus, chest, bilateral hips, bilateral knees, CT head, CT cervical spine, CT lumbar spine were all negative.    She received lasix 80 mg and lokelma in the ER.      
I have reviewed and confirmed nurses' notes for patient's medications, allergies, medical history, and surgical history.

## 2025-07-18 NOTE — SUBJECTIVE & OBJECTIVE
"Past Medical History:   Diagnosis Date    Amblyopia of left eye 04/10/2013    Arthritis     Facet arthropathy, Lumbosacral    Atherosclerosis of aorta     Cataract     Central retinal vein occlusion of left eye     CKD (chronic kidney disease) stage 3, GFR 30-59 ml/min     Diabetes mellitus, type 2     Diabetic polyneuropathy 2022    Exotropia of both eyes 2013    recession RSR 5.0mm w/ adj; recession LR os 5.0 w/ adj; resect MR os  4.0mm    Hearing loss     History of resection of small bowel     Hypertensive retinopathy of both eyes     Hypoglycemia     Macular degeneration     OA (osteoarthritis) of shoulder     Right    Osteoporosis     Posterior vitreous detachment of both eyes     Psychiatric problem     Rhinitis     TIA (transient ischemic attack)        Past Surgical History:   Procedure Laterality Date    APPENDECTOMY      CARDIAC CATHETERIZATION      CATARACT EXTRACTION W/  INTRAOCULAR LENS IMPLANT Bilateral      SECTION, CLASSIC      CLOSURE OF LEFT ATRIAL APPENDAGE USING DEVICE N/A 2020    Procedure: Left atrial appendage closure device;  Surgeon: Abundio Curtis MD;  Location: [facility] CATH LAB;  Service: Cardiology;  Laterality: N/A;    HYSTERECTOMY      INNER EAR SURGERY      IRRIGATION AND DEBRIDEMENT OF UPPER EXTREMITY Right 2024    Procedure: IRRIGATION AND DEBRIDEMENT, UPPER EXTREMITY;  Surgeon: Con Moran Jr., MD;  Location: [facility];  Service: Orthopedics;  Laterality: Right;    JOINT REPLACEMENT      LEFT KNEE REPLACEMENT IN  -    OOPHORECTOMY      SINUS SURGERY      STRABISMUS SURGERY  13    RSR recession 5 mm, LLR recession 5 mm and LMR resection 4mm    STRABISMUS SURGERY  2014    recess LR OD 6mm    TONSILLECTOMY      watchman surgery N/A 2020       Review of patient's allergies indicates:   Allergen Reactions    Opioids - morphine analogues Other (See Comments)     Bowel issues; bowel obstruction    Tizanidine Other (See Comments)     "Lips " "were numb, almost passed out."    Tramadol Hallucinations    Morphine     Opioids-meperidine and related     Ciprofloxacin Rash       No current facility-administered medications on file prior to encounter.     Current Outpatient Medications on File Prior to Encounter   Medication Sig    aspirin (ECOTRIN) 81 MG EC tablet Take 81 mg by mouth once daily.    estradioL (ESTRACE) 0.01 % (0.1 mg/gram) vaginal cream Place 1 g vaginally 3 (three) times a week.    furosemide (LASIX) 20 MG tablet Take 2 tablets in the a.m. for weights 155-159, take 2 tablets twice a day for weights 160 over, none for weights less than 155    hydrocortisone 2.5 % cream Apply topically 2 (two) times daily as needed (itchy rash).    mupirocin (BACTROBAN) 2 % ointment Apply topically 3 (three) times daily.    pravastatin (PRAVACHOL) 40 MG tablet Take 1 tablet (40 mg total) by mouth once daily.    propylene glycol (SYSTANE COMPLETE OPHT) Apply to eye.    psyllium 0.52 gram capsule Take 3 capsules by mouth 3 (three) times daily as needed (constipation).    spironolactone (ALDACTONE) 25 MG tablet Take 1 tablet (25 mg total) by mouth 2 (two) times daily.     Family History       Problem Relation (Age of Onset)    Breast cancer Maternal Aunt    Diabetes Sister, Brother    Heart disease Sister, Sister    Hypertension Mother, Father    Liver disease Sister    No Known Problems Maternal Uncle, Paternal Aunt, Paternal Uncle, Maternal Grandmother, Maternal Grandfather, Paternal Grandmother, Paternal Grandfather, Daughter, Son, Son, Son          Tobacco Use    Smoking status: Former     Current packs/day: 0.00     Types: Cigarettes     Quit date: 10/29/1982     Years since quittin.7     Passive exposure: Never    Smokeless tobacco: Never   Substance and Sexual Activity    Alcohol use: Yes     Alcohol/week: 2.0 standard drinks of alcohol     Types: 2 Shots of liquor per week     Comment: less than once every 6 months    Drug use: No    Sexual activity: " Yes     Review of Systems   Constitutional: Negative.  Negative for fatigue and fever.   HENT: Negative.     Respiratory: Negative.  Negative for choking, wheezing and stridor.    Cardiovascular:  Negative for chest pain and palpitations.   Gastrointestinal: Negative.  Negative for abdominal distention, constipation, diarrhea and nausea.   Genitourinary: Negative.    Musculoskeletal: Negative.    Skin: Negative.    Neurological:  Positive for tremors. Negative for seizures, facial asymmetry and light-headedness.     Objective:     Vital Signs (Most Recent):  Temp: 96.8 °F (36 °C) (07/17/25 1555)  Pulse: 77 (07/17/25 1800)  Resp: 20 (07/17/25 1555)  BP: (!) 134/57 (07/17/25 1555)  SpO2: (!) 86 % (07/17/25 1800) Vital Signs (24h Range):  Temp:  [96.8 °F (36 °C)-98.3 °F (36.8 °C)] 96.8 °F (36 °C)  Pulse:  [66-77] 77  Resp:  [18-20] 20  SpO2:  [86 %-98 %] 86 %  BP: (127-148)/(57-79) 134/57     Weight: 74.8 kg (165 lb)  Body mass index is 27.46 kg/m².     Physical Exam  Vitals and nursing note reviewed.   Constitutional:       General: She is not in acute distress.     Appearance: She is not ill-appearing or toxic-appearing.      Comments: Extremely hard of hearing   HENT:      Head: Normocephalic and atraumatic.   Cardiovascular:      Rate and Rhythm: Normal rate. Rhythm irregular.      Pulses: Normal pulses.      Heart sounds: No murmur heard.  Pulmonary:      Effort: Pulmonary effort is normal. No respiratory distress.      Breath sounds: Normal breath sounds. No wheezing.   Abdominal:      General: Bowel sounds are normal. There is no distension.      Palpations: Abdomen is soft.      Tenderness: There is no abdominal tenderness.   Musculoskeletal:      Right lower leg: No edema.      Left lower leg: No edema.   Skin:     Coloration: Skin is not jaundiced or pale.   Neurological:      Mental Status: She is alert. Mental status is at baseline.                Significant Labs: All pertinent labs within the past 24  hours have been reviewed.  Recent Lab Results         07/17/25  1521   07/17/25  1329        Albumin   4.2       ALP   96       ALT   15       Anion Gap   9       Appearance, UA Clear         PTT   27.5  Comment: Refer to local heparin nomogram for intensity/dose specific therapeutic range.       AST   18       Bacteria, UA Rare         Baso #   0.01       Basophil %   0.3       Bilirubin (UA) Negative         BILIRUBIN TOTAL   0.7  Comment: For infants and newborns, interpretation of results should be based   on gestational age, weight and in agreement with clinical   observations.    Premature Infant recommended reference ranges:   0-24 hours:  <8.0 mg/dL   24-48 hours: <12.0 mg/dL   3-5 days:    <15.0 mg/dL   6-29 days:   <15.0 mg/dL       BNP   273  Comment: Values of less than 100 pg/ml are consistent with non-CHF populations.        BUN   53       Calcium   8.7       Chloride   99       CO2   20       Color, UA Yellow         Creatinine   1.9       eGFR   24  Comment: Estimated GFR calculated using the CKD-EPI creatinine (2021) equation.       Eos #   0.01       Eos %   0.3       Glucose   70       Glucose, UA Negative         Gran # (ANC)   3.16       Hematocrit   26.7       Hemoglobin   9.0       Hyaline Casts, UA 1         Immature Grans (Abs)   0.02  Comment: Mild elevation in immature granulocytes is non specific and can be seen in a variety of conditions including stress response, acute inflammation, trauma and pregnancy. Correlation with other laboratory and clinical findings is essential.       Immature Granulocytes   0.5       INR   1.0  Comment: Coumadin Therapy:    2.0 - 3.0 for INR for all indicators except mechanical heart valves    and antiphospholipid syndromes which should use 2.5 - 3.5.       Ketones, UA Negative         Leukocyte Esterase, UA Trace         Lymph #   0.37       Lymph %   9.3       Magnesium    2.0       MCH   29.4       MCHC   33.7       MCV   87       Microscopic Comment    Comment: Other formed elements not mentioned in the report are not present in the microscopic examination.         Mono #   0.42       Mono %   10.5       MPV   10.3       Neut %   79.1       NITRITE UA Negative         nRBC   0       Blood, UA Negative         pH, UA 5.0         Platelet Count   118       Potassium   5.6  Comment: *No Visible Hemolysis       PROTEIN TOTAL   6.8       Protein, UA Negative  Comment: Recommend a 24 hour urine protein or a urine protein/creatinine ratio if globulin induced proteinuria is clinically suspected.         PT   11.3       RBC   3.06       RBC, UA <1         RDW   16.7       Sodium   128       Spec Grav UA 1.010         Squam Epithel, UA 2         Troponin I High Sensitivity   7       Urobilinogen, UA Negative         WBC, UA 3         WBC   3.99               Significant Imaging: I have reviewed all pertinent imaging results/findings within the past 24 hours.

## 2025-07-18 NOTE — ASSESSMENT & PLAN NOTE
Patient has long standing persistent (>12 months) atrial fibrillation. Patient is currently in atrial fibrillation. [id number] Score: 3. The patients heart rate in the last 24 hours is as follows:  Pulse  Min: 66  Max: 77     Antiarrhythmics       Anticoagulants  heparin (porcine) injection 5,000 Units, Every 8 hours, Subcutaneous    Plan  - Replete lytes with a goal of K>4, Mg >2  - Patient is not anticoagulated due to watchman's device  - Patient's afib is currently controlled

## 2025-07-18 NOTE — ASSESSMENT & PLAN NOTE
Hyponatremia is likely due to Heart Failure. The patient's most recent sodium results are listed below.  Recent Labs     07/17/25  1329 07/17/25  2210 07/18/25  0626   * 127* 128*     Plan  - Correct the sodium by 4-6mEq in 24 hours.   - Obtain the following studies: Urine sodium, urine osmolality, serum osmolality.  - Will treat the hyponatremia -  Fluid restriction of:  1.2 liter per day, diuresis  - Monitor sodium Daily.   - Patient hyponatremia is stable

## 2025-07-18 NOTE — ASSESSMENT & PLAN NOTE
Endorses SOB on exertion.  Elevated BNP above baseline.   On exam: rales, wheezes, BLE pitting edema, JVD  Last echo in 2023 - LVEF of 55% with indeterminate diastolic dysfunction  Continue IV lasix with goal net negative 2-3 L daily  Fluid restriction < 1.2L daily

## 2025-07-18 NOTE — PROGRESS NOTES
[facility] - Emergency Dept  Hospital Medicine  Progress Note    Patient Name: Jenniffer Jimenez  MRN: [id number]  Patient Class: IP- Inpatient   Admission Date: 7/17/2025  Length of Stay: 0 days  Attending Physician: Nely Chahal MD  Primary Care Provider: Reed Ruiz MD        Subjective     Principal Problem:Acute on chronic heart failure with preserved ejection fraction        HPI:  Ms. Abad is a 89 year female medical history significant for atrial fibrillation status post Watchman, CKD, hypertension, T2DM, and new tremor disorder who presented to the ER following falls at home.  Of note, patient fell at home about 2 days ago.  She had 2 fall episodes in the span of 2 days. She reports that the tremors have worsened  which caused her to fall. She denies head strikes or bleeding from any orifices. '    She endorses left knee pain.  She is seen at bedside with her daughter.  Patient is extremely hard of hearing.  States her major concern is the frequent falls as well as tremors.  She reports that the tremors have worsened.  She is scheduled with neurology on August 12.      In the ER, she was noted to be hemodynamically stable.  CBC revealed hemoglobin of 9, WBC count of 3.9 and platelet count of 118.  CMP with sodium of 128, potassium of 5.6, chloride of 99, bicarb of 20, BUN of 53, creatinine of 1.9 glucose of 17, magnesium of 2.0. Troponin was negative.  BNP of 273. Urinalysis with trace leukocyte esterase and unremarkable microscopy.   Traumatic evaluation with x-ray of the right humerus, chest, bilateral hips, bilateral knees, CT head, CT cervical spine, CT lumbar spine were all negative.    She received lasix 80 mg and lokelma in the ER.        Overview/Hospital Course:  Presented with progressive upper and lower extremity tremors with multiple falls at home. Neurology and PTOT were consulted. Patient also found to be in acute CHF and associated hyponatremia, requiring IV Lasix.    Interval History: met with  patient and daughter at bedside. Pt reports continued tremors of upper and lower extremities. She reports continued shortness of breath and edema of her legs. Consulting with Neurology as well as PTOT. Continue IV Lasix for symptomatic volume overload.     Review of Systems   Constitutional:  Positive for activity change and fatigue. Negative for appetite change, chills, diaphoresis, fever and unexpected weight change.   Respiratory:  Positive for shortness of breath and wheezing. Negative for apnea, cough, choking, chest tightness and stridor.    Cardiovascular:  Positive for leg swelling. Negative for chest pain and palpitations.   Neurological:  Positive for tremors and weakness. Negative for syncope.   All other systems reviewed and are negative.    Objective:     Vital Signs (Most Recent):  Temp: 97.8 °F (36.6 °C) (07/18/25 1100)  Pulse: 65 (07/18/25 1200)  Resp: 18 (07/18/25 1130)  BP: (!) 148/70 (07/18/25 1200)  SpO2: 99 % (07/18/25 1200) Vital Signs (24h Range):  Temp:  [96.8 °F (36 °C)-98.2 °F (36.8 °C)] 97.8 °F (36.6 °C)  Pulse:  [53-77] 65  Resp:  [14-21] 18  SpO2:  [86 %-100 %] 99 %  BP: ()/(51-89) 148/70     Weight: 74.8 kg (165 lb)  Body mass index is 27.46 kg/m².    Intake/Output Summary (Last 24 hours) at 7/18/2025 1334  Last data filed at 7/18/2025 1311  Gross per 24 hour   Intake 360 ml   Output 600 ml   Net -240 ml         Physical Exam  Vitals reviewed.   Constitutional:       General: She is not in acute distress.     Appearance: Normal appearance. She is normal weight. She is not ill-appearing, toxic-appearing or diaphoretic.   HENT:      Head: Normocephalic and atraumatic.      Nose: Nose normal.      Mouth/Throat:      Mouth: Mucous membranes are moist.      Pharynx: No oropharyngeal exudate.   Eyes:      General: No scleral icterus.        Right eye: No discharge.         Left eye: No discharge.      Extraocular Movements: Extraocular movements intact.      Conjunctiva/sclera:  Conjunctivae normal.   Neck:      Thyroid: No thyromegaly.      Vascular: No JVD.      Trachea: No tracheal deviation.   Cardiovascular:      Comments: JVD  Pulmonary:      Effort: Pulmonary effort is normal. No respiratory distress.      Breath sounds: Wheezing and rales present.   Abdominal:      General: Abdomen is flat.   Musculoskeletal:         General: No deformity.      Right lower leg: Edema present.      Left lower leg: Edema present.   Skin:     Coloration: Skin is not pale.   Neurological:      Mental Status: She is alert and oriented to person, place, and time. Mental status is at baseline.      Cranial Nerves: No cranial nerve deficit.      Motor: No abnormal muscle tone.               Significant Labs: All pertinent labs within the past 24 hours have been reviewed.    Significant Imaging: I have reviewed all pertinent imaging results/findings within the past 24 hours.      Assessment & Plan  Acute on chronic heart failure with preserved ejection fraction  Endorses SOB on exertion.  Elevated BNP above baseline.   On exam: rales, wheezes, BLE pitting edema, JVD  Last echo in 2023 - LVEF of 55% with indeterminate diastolic dysfunction  Continue IV lasix with goal net negative 2-3 L daily  Fluid restriction < 1.2L daily  Ambulatory dysfunction  Falls  Likely related to progressive tremors.  Traumatic evaluation showed no fracture or misalignment.  Neurology consulted  PT/OT evaluation - low intensity, RW, BSC  Primary hypertension  Patient's blood pressure range in the last 24 hours was: BP  Min: 99/61  Max: 160/70.The patient's inpatient anti-hypertensive regimen is listed below:  Current Antihypertensives  furosemide injection 40 mg, Daily, Intravenous  Not on any BP meds  Plan  - BP is controlled, no changes needed to their regimen  Chronic a-fib  Patient has long standing persistent (>12 months) atrial fibrillation. Patient is currently in atrial fibrillation. [id number] Score: 3. The patients heart  rate in the last 24 hours is as follows:  Pulse  Min: 53  Max: 77     Antiarrhythmics       Anticoagulants  heparin (porcine) injection 5,000 Units, Every 8 hours, Subcutaneous    Plan  - Replete lytes with a goal of K>4, Mg >2  - Patient is not anticoagulated due to watchman's device  - Patient's afib is currently controlled  Hyperkalemia  Hyperkalemia is likely due to Medication induced.The patients most recent potassium results are listed below.  Recent Labs     07/17/25  1329 07/17/25  2210 07/18/25  0626   K 5.6* 5.0 5.1     Plan  - Monitor for arrhythmias with EKG and/or continuous telemetry.   - Treat the hyperkalemia with Potassium Binders and Furosemide.   - Monitor potassium: Every 12 hours  - The patient's hyperkalemia is resolved  Tremor of unknown origin  Reports worsening tremors initially involving lower extremities and now affecting the upper extremities.  Likely etiology of new falls  Will consult neurology inpatient to evaluate for movement disorder since she has missed prior neurology appt.  May be a candidate for a dopaminergic agent trial.  Neurology consulted. Appreciate recs  Hyponatremia  Hyponatremia is likely due to Heart Failure. The patient's most recent sodium results are listed below.  Recent Labs     07/17/25  1329 07/17/25  2210 07/18/25  0626   * 127* 128*     Plan  - Correct the sodium by 4-6mEq in 24 hours.   - Obtain the following studies: Urine sodium, urine osmolality, serum osmolality.  - Will treat the hyponatremia -  Fluid restriction of:  1.2 liter per day, diuresis  - Monitor sodium Daily.   - Patient hyponatremia is stable  VTE Risk Mitigation (From admission, onward)           Ordered     heparin (porcine) injection 5,000 Units  Every 8 hours         07/17/25 1818     IP VTE HIGH RISK PATIENT  Once         07/17/25 1818     Place sequential compression device  Until discontinued         07/17/25 1818                    Discharge Planning   GILES: 7/19/2025     Code  Status: Full Code   Medical Readiness for Discharge Date:   Discharge Plan A: Home with family                        Nely Chahal MD  Department of Hospital Medicine   [facility] - Emergency Dept

## 2025-07-18 NOTE — ASSESSMENT & PLAN NOTE
Endorses SOB on exertion.  Does have mild JVD  Last echo in 2023 - LVEF of 55% with indeterminate diastolic dysfunction  Will give IV lasix.  Fluid restriction < 1.2L daily  Can consider 2D echo for reassessment.

## 2025-07-18 NOTE — PLAN OF CARE
[facility] - Emergency Dept  Initial Discharge Assessment       Primary Care Provider: Reed Ruiz MD    Admission Diagnosis: Fall [W19.XXXA]    Admission Date: 7/17/2025  Expected Discharge Date:     Sw met pt at bedside. Pt lives alone and ambulates with a Rolator. Pt's daughter stated pt is semi independent. Pt has no acute case management needs at this time.      Carola Conway LMSW  Case Management  Emergency Department  [phone]         Transition of Care Barriers: (P) None    Payor: [employer] MEDICARE / Plan: [employer] MEDICARE HMO / Product Type: Capitation /     Extended Emergency Contact Information  Primary Emergency Contact: Paul Duenas  Mobile Phone: [phone]  Relation: Healthcare Power of   Preferred language: English   needed? No  Secondary Emergency Contact: Afshan Duenas   [facility]  Home Phone: [phone]  Relation: Daughter  Preferred language: English   needed? No    Discharge Plan A: (P) Home with family  Discharge Plan B: (P) Home with family      [id number] - [place] - [address] AT [facility] [address]  [address] [place] [address]  Phone: [phone] Fax: [phone]    [id number] - [place] - [address] AT [facility]  [address]  [place] [address]  Phone: [phone] Fax: [phone]    [facility]  [address]  [place] [address]  Phone: [phone] Fax: [phone]    [facility] [place]  [address]  [place] [address]  Phone: [phone] Fax: [phone]    [facility] ([place]) - [place] - [address]  [address] [place] [address]  Phone: [phone] Fax: [phone]      Initial Assessment (most recent)       Adult Discharge Assessment - 07/17/25 2000          Discharge Assessment    Assessment Type Discharge Planning  Assessment (P)      Confirmed/corrected address, phone number and insurance Yes (P)      Confirmed Demographics Correct on Facesheet (P)      Source of Information patient (P)      Communicated GILES with patient/caregiver Date not available/Unable to determine (P)      People in Home alone (P)      Do you expect to return to your current living situation? Yes (P)      Do you have help at home or someone to help you manage your care at home? Yes (P)      Prior to hospitilization cognitive status: Alert/Oriented (P)      Current cognitive status: Alert/Oriented (P)      Walking or Climbing Stairs Difficulty yes (P)      Walking or Climbing Stairs ambulation difficulty, requires equipment (P)      Dressing/Bathing Difficulty yes (P)      Home Accessibility not wheelchair accessible (P)      Equipment Currently Used at Home walker, standard;shower chair (P)      Readmission within 30 days? No (P)      Patient currently being followed by outpatient case management? No (P)      Do you currently have service(s) that help you manage your care at home? No (P)      Do you take prescription medications? Yes (P)      Do you have prescription coverage? Yes (P)      Do you have any problems affording any of your prescribed medications? Yes (P)      Is the patient taking medications as prescribed? yes (P)      How do you get to doctors appointments? family or friend will provide (P)      Are you on dialysis? No (P)      Do you take coumadin? No (P)      Discharge Plan A Home with family (P)      Discharge Plan B Home with family (P)      DME Needed Upon Discharge  none (P)      Discharge Plan discussed with: Patient (P)      Transition of Care Barriers None (P)         Physical Activity    On average, how many days per week do you engage in moderate to strenuous exercise (like a brisk walk)? 5 days (P)      On average, how many minutes do you engage in exercise at this level? 40 min (P)         Financial Resource Strain    How hard  is it for you to pay for the very basics like food, housing, medical care, and heating? Not hard at all (P)         Housing Stability    In the last 12 months, was there a time when you were not able to pay the mortgage or rent on time? No (P)      At any time in the past 12 months, were you homeless or living in a shelter (including now)? No (P)         Transportation Needs    In the past 12 months, has lack of transportation kept you from medical appointments or from getting medications? No (P)      In the past 12 months, has lack of transportation kept you from meetings, work, or from getting things needed for daily living? No (P)         Food Insecurity    Within the past 12 months, you worried that your food would run out before you got the money to buy more. Never true (P)      Within the past 12 months, the food you bought just didn't last and you didn't have money to get more. Never true (P)         Stress    Do you feel stress - tense, restless, nervous, or anxious, or unable to sleep at night because your mind is troubled all the time - these days? Rather much (P)         Social Isolation    How often do you feel lonely or isolated from those around you?  Rarely (P)         Alcohol Use    Q1: How often do you have a drink containing alcohol? Never (P)      Q2: How many drinks containing alcohol do you have on a typical day when you are drinking? Patient does not drink (P)      Q3: How often do you have six or more drinks on one occasion? Never (P)         Utilities    In the past 12 months has the electric, gas, oil, or water company threatened to shut off services in your home? No (P)         Health Literacy    How often do you need to have someone help you when you read instructions, pamphlets, or other written material from your doctor or pharmacy? Never (P)

## 2025-07-18 NOTE — SUBJECTIVE & OBJECTIVE
Interval History: met with patient and daughter at bedside. Pt reports continued tremors of upper and lower extremities. She reports continued shortness of breath and edema of her legs. Consulting with Neurology as well as PTOT. Continue IV Lasix for symptomatic volume overload.     Review of Systems   Constitutional:  Positive for activity change and fatigue. Negative for appetite change, chills, diaphoresis, fever and unexpected weight change.   Respiratory:  Positive for shortness of breath and wheezing. Negative for apnea, cough, choking, chest tightness and stridor.    Cardiovascular:  Positive for leg swelling. Negative for chest pain and palpitations.   Neurological:  Positive for tremors and weakness. Negative for syncope.   All other systems reviewed and are negative.    Objective:     Vital Signs (Most Recent):  Temp: 97.8 °F (36.6 °C) (07/18/25 1100)  Pulse: 65 (07/18/25 1200)  Resp: 18 (07/18/25 1130)  BP: (!) 148/70 (07/18/25 1200)  SpO2: 99 % (07/18/25 1200) Vital Signs (24h Range):  Temp:  [96.8 °F (36 °C)-98.2 °F (36.8 °C)] 97.8 °F (36.6 °C)  Pulse:  [53-77] 65  Resp:  [14-21] 18  SpO2:  [86 %-100 %] 99 %  BP: ()/(51-89) 148/70     Weight: 74.8 kg (165 lb)  Body mass index is 27.46 kg/m².    Intake/Output Summary (Last 24 hours) at 7/18/2025 1334  Last data filed at 7/18/2025 1311  Gross per 24 hour   Intake 360 ml   Output 600 ml   Net -240 ml         Physical Exam  Vitals reviewed.   Constitutional:       General: She is not in acute distress.     Appearance: Normal appearance. She is normal weight. She is not ill-appearing, toxic-appearing or diaphoretic.   HENT:      Head: Normocephalic and atraumatic.      Nose: Nose normal.      Mouth/Throat:      Mouth: Mucous membranes are moist.      Pharynx: No oropharyngeal exudate.   Eyes:      General: No scleral icterus.        Right eye: No discharge.         Left eye: No discharge.      Extraocular Movements: Extraocular movements intact.       Conjunctiva/sclera: Conjunctivae normal.   Neck:      Thyroid: No thyromegaly.      Vascular: No JVD.      Trachea: No tracheal deviation.   Cardiovascular:      Comments: JVD  Pulmonary:      Effort: Pulmonary effort is normal. No respiratory distress.      Breath sounds: Wheezing and rales present.   Abdominal:      General: Abdomen is flat.   Musculoskeletal:         General: No deformity.      Right lower leg: Edema present.      Left lower leg: Edema present.   Skin:     Coloration: Skin is not pale.   Neurological:      Mental Status: She is alert and oriented to person, place, and time. Mental status is at baseline.      Cranial Nerves: No cranial nerve deficit.      Motor: No abnormal muscle tone.               Significant Labs: All pertinent labs within the past 24 hours have been reviewed.    Significant Imaging: I have reviewed all pertinent imaging results/findings within the past 24 hours.

## 2025-07-18 NOTE — ED NOTES
Rounding on the patient has been done. The patient has been updated on the plan of care and current status. Pain was assessed and is currently a 5/10. Comfort positioning and restroom needs were addressed. Pt assisted to use bedside commode.Necessary items were placed with in reach and was advised when a reassessment would take place. The call bell remains at the bedside for any additional patient needs. The patient is resting comfortably on the stretcher, respirations are even and unlabored at rest - SOB noted w/ exertion,  skin warm and dry. Will continue to monitor.

## 2025-07-19 LAB
ALBUMIN SERPL BCP-MCNC: 3.9 G/DL (ref 3.5–5.2)
ALP SERPL-CCNC: 91 UNIT/L (ref 40–150)
ALT SERPL W/O P-5'-P-CCNC: 11 UNIT/L (ref 10–44)
ANION GAP (OHS): 12 MMOL/L (ref 8–16)
AST SERPL-CCNC: 17 UNIT/L (ref 11–45)
BILIRUB SERPL-MCNC: 0.7 MG/DL (ref 0.1–1)
BUN SERPL-MCNC: 62 MG/DL (ref 10–30)
CALCIUM SERPL-MCNC: 8.3 MG/DL (ref 8.7–10.5)
CHLORIDE SERPL-SCNC: 99 MMOL/L (ref 95–110)
CO2 SERPL-SCNC: 18 MMOL/L (ref 23–29)
CREAT SERPL-MCNC: 2 MG/DL (ref 0.5–1.4)
GFR SERPLBLD CREATININE-BSD FMLA CKD-EPI: 23 ML/MIN/1.73/M2
GLUCOSE SERPL-MCNC: 99 MG/DL (ref 70–110)
MAGNESIUM SERPL-MCNC: 2 MG/DL (ref 1.6–2.6)
POTASSIUM SERPL-SCNC: 5.1 MMOL/L (ref 3.5–5.1)
PROT SERPL-MCNC: 6.3 GM/DL (ref 6–8.4)
SODIUM SERPL-SCNC: 129 MMOL/L (ref 136–145)

## 2025-07-19 PROCEDURE — 25000003 PHARM REV CODE 250: Mod: HCNC | Performed by: INTERNAL MEDICINE

## 2025-07-19 PROCEDURE — 63600175 PHARM REV CODE 636 W HCPCS: Mod: HCNC | Performed by: INTERNAL MEDICINE

## 2025-07-19 PROCEDURE — 83735 ASSAY OF MAGNESIUM: CPT | Mod: HCNC | Performed by: INTERNAL MEDICINE

## 2025-07-19 PROCEDURE — 25000003 PHARM REV CODE 250: Mod: HCNC | Performed by: HOSPITALIST

## 2025-07-19 PROCEDURE — 21400001 HC TELEMETRY ROOM: Mod: HCNC

## 2025-07-19 PROCEDURE — 36415 COLL VENOUS BLD VENIPUNCTURE: CPT | Mod: HCNC | Performed by: INTERNAL MEDICINE

## 2025-07-19 PROCEDURE — 94761 N-INVAS EAR/PLS OXIMETRY MLT: CPT | Mod: HCNC

## 2025-07-19 PROCEDURE — 80053 COMPREHEN METABOLIC PANEL: CPT | Mod: HCNC | Performed by: INTERNAL MEDICINE

## 2025-07-19 PROCEDURE — 63600175 PHARM REV CODE 636 W HCPCS: Mod: HCNC | Performed by: HOSPITALIST

## 2025-07-19 RX ORDER — FUROSEMIDE 10 MG/ML
80 INJECTION INTRAMUSCULAR; INTRAVENOUS ONCE
Status: DISCONTINUED | OUTPATIENT
Start: 2025-07-19 | End: 2025-07-19

## 2025-07-19 RX ORDER — OXYCODONE HYDROCHLORIDE 5 MG/1
5 TABLET ORAL ONCE AS NEEDED
Status: DISCONTINUED | OUTPATIENT
Start: 2025-07-19 | End: 2025-07-19

## 2025-07-19 RX ORDER — MORPHINE SULFATE 2 MG/ML
1 INJECTION, SOLUTION INTRAMUSCULAR; INTRAVENOUS ONCE
Status: DISCONTINUED | OUTPATIENT
Start: 2025-07-19 | End: 2025-07-19

## 2025-07-19 RX ORDER — FUROSEMIDE 10 MG/ML
120 INJECTION INTRAMUSCULAR; INTRAVENOUS ONCE
Status: COMPLETED | OUTPATIENT
Start: 2025-07-19 | End: 2025-07-19

## 2025-07-19 RX ADMIN — ACETAMINOPHEN 1000 MG: 500 TABLET ORAL at 04:07

## 2025-07-19 RX ADMIN — MUSCLE RUB CREAM: 100; 150 CREAM TOPICAL at 08:07

## 2025-07-19 RX ADMIN — HEPARIN SODIUM 5000 UNITS: 5000 INJECTION INTRAVENOUS; SUBCUTANEOUS at 05:07

## 2025-07-19 RX ADMIN — ASPIRIN 81 MG: 81 TABLET, COATED ORAL at 08:07

## 2025-07-19 RX ADMIN — LIDOCAINE 1 PATCH: 50 PATCH CUTANEOUS at 08:07

## 2025-07-19 RX ADMIN — PRAVASTATIN SODIUM 40 MG: 20 TABLET ORAL at 08:07

## 2025-07-19 RX ADMIN — ACETAMINOPHEN 1000 MG: 500 TABLET ORAL at 08:07

## 2025-07-19 RX ADMIN — MUSCLE RUB CREAM: 100; 150 CREAM TOPICAL at 04:07

## 2025-07-19 RX ADMIN — FUROSEMIDE 120 MG: 10 INJECTION, SOLUTION INTRAVENOUS at 04:07

## 2025-07-19 RX ADMIN — HEPARIN SODIUM 5000 UNITS: 5000 INJECTION INTRAVENOUS; SUBCUTANEOUS at 04:07

## 2025-07-19 RX ADMIN — HEPARIN SODIUM 5000 UNITS: 5000 INJECTION INTRAVENOUS; SUBCUTANEOUS at 09:07

## 2025-07-19 NOTE — ASSESSMENT & PLAN NOTE
Patient has long standing persistent (>12 months) atrial fibrillation. Patient is currently in atrial fibrillation. [id number] Score: 3. The patients heart rate in the last 24 hours is as follows:  Pulse  Min: 57  Max: 75     Antiarrhythmics       Anticoagulants  heparin (porcine) injection 5,000 Units, Every 8 hours, Subcutaneous    Plan  - Replete lytes with a goal of K>4, Mg >2  - Patient is not anticoagulated due to watchman's device  - Patient's afib is currently controlled

## 2025-07-19 NOTE — ASSESSMENT & PLAN NOTE
Patient's blood pressure range in the last 24 hours was: BP  Min: 105/51  Max: 145/65.The patient's inpatient anti-hypertensive regimen is listed below:  Current Antihypertensives  furosemide injection 80 mg, Once, Intravenous  furosemide injection 80 mg, Once, Intravenous  Not on any BP meds  Plan  - BP is controlled, no changes needed to their regimen

## 2025-07-19 NOTE — SUBJECTIVE & OBJECTIVE
Interval History:  She reports continued shortness of breath and edema of her legs. Continue IV Lasix for symptomatic volume overload. Awaiting MRI brain per discussion w/ Neurology    Review of Systems   Constitutional:  Positive for activity change and fatigue. Negative for appetite change, chills, diaphoresis, fever and unexpected weight change.   Respiratory:  Positive for shortness of breath. Negative for apnea, cough, choking, chest tightness, wheezing and stridor.    Cardiovascular:  Positive for leg swelling. Negative for chest pain and palpitations.   Neurological:  Positive for tremors and weakness. Negative for syncope.   All other systems reviewed and are negative.    Objective:     Vital Signs (Most Recent):  Temp: 97.4 °F (36.3 °C) (07/19/25 1109)  Pulse: 61 (07/19/25 1113)  Resp: 17 (07/19/25 1109)  BP: 134/65 (07/19/25 1109)  SpO2: 98 % (07/19/25 1109) Vital Signs (24h Range):  Temp:  [97.3 °F (36.3 °C)-98.9 °F (37.2 °C)] 97.4 °F (36.3 °C)  Pulse:  [57-75] 61  Resp:  [15-20] 17  SpO2:  [95 %-100 %] 98 %  BP: (105-145)/(51-74) 134/65     Weight: 74.8 kg (165 lb)  Body mass index is 27.46 kg/m².    Intake/Output Summary (Last 24 hours) at 7/19/2025 1221  Last data filed at 7/19/2025 0441  Gross per 24 hour   Intake 300 ml   Output 900 ml   Net -600 ml         Physical Exam  Vitals reviewed.   Constitutional:       General: She is not in acute distress.     Appearance: Normal appearance. She is normal weight. She is not ill-appearing, toxic-appearing or diaphoretic.   HENT:      Head: Normocephalic and atraumatic.      Nose: Nose normal.      Mouth/Throat:      Mouth: Mucous membranes are moist.      Pharynx: No oropharyngeal exudate.   Eyes:      General: No scleral icterus.        Right eye: No discharge.         Left eye: No discharge.      Extraocular Movements: Extraocular movements intact.      Conjunctiva/sclera: Conjunctivae normal.   Neck:      Thyroid: No thyromegaly.      Vascular: No JVD.       Trachea: No tracheal deviation.   Cardiovascular:      Comments: JVD  Pulmonary:      Effort: Pulmonary effort is normal. No respiratory distress.      Breath sounds: No wheezing or rales.   Abdominal:      General: Abdomen is flat.   Musculoskeletal:         General: No deformity.      Right lower leg: Edema present.      Left lower leg: Edema present.   Skin:     Coloration: Skin is not pale.   Neurological:      Mental Status: She is alert and oriented to person, place, and time. Mental status is at baseline.      Cranial Nerves: No cranial nerve deficit.      Motor: No abnormal muscle tone.               Significant Labs: All pertinent labs within the past 24 hours have been reviewed.    Significant Imaging: I have reviewed all pertinent imaging results/findings within the past 24 hours.

## 2025-07-19 NOTE — PROGRESS NOTES
[facility] Medicine  Progress Note    Patient Name: Jenniffer Jimenez  MRN: [id number]  Patient Class: IP- Inpatient   Admission Date: 7/17/2025  Length of Stay: 1 days  Attending Physician: Nely Chahal MD  Primary Care Provider: Reed Ruiz MD        Subjective     Principal Problem:Acute on chronic heart failure with preserved ejection fraction        HPI:  Ms. Abad is a 89 year female medical history significant for atrial fibrillation status post Watchman, CKD, hypertension, T2DM, and new tremor disorder who presented to the ER following falls at home.  Of note, patient fell at home about 2 days ago.  She had 2 fall episodes in the span of 2 days. She reports that the tremors have worsened  which caused her to fall. She denies head strikes or bleeding from any orifices. '    She endorses left knee pain.  She is seen at bedside with her daughter.  Patient is extremely hard of hearing.  States her major concern is the frequent falls as well as tremors.  She reports that the tremors have worsened.  She is scheduled with neurology on August 12.      In the ER, she was noted to be hemodynamically stable.  CBC revealed hemoglobin of 9, WBC count of 3.9 and platelet count of 118.  CMP with sodium of 128, potassium of 5.6, chloride of 99, bicarb of 20, BUN of 53, creatinine of 1.9 glucose of 17, magnesium of 2.0. Troponin was negative.  BNP of 273. Urinalysis with trace leukocyte esterase and unremarkable microscopy.   Traumatic evaluation with x-ray of the right humerus, chest, bilateral hips, bilateral knees, CT head, CT cervical spine, CT lumbar spine were all negative.    She received lasix 80 mg and lokelma in the ER.        Overview/Hospital Course:  Presented with progressive upper and lower extremity tremors with multiple falls at home. Neurology and PTOT were consulted. MRI brain ordered; B1 pending. Patient also found to be in acute CHF and associated hyponatremia, requiring IV Lasix. DVT  u/s ordered for R leg pain.     Interval History:  She reports continued shortness of breath and edema of her legs. Continue IV Lasix for symptomatic volume overload. Awaiting MRI brain per discussion w/ Neurology    Review of Systems   Constitutional:  Positive for activity change and fatigue. Negative for appetite change, chills, diaphoresis, fever and unexpected weight change.   Respiratory:  Positive for shortness of breath. Negative for apnea, cough, choking, chest tightness, wheezing and stridor.    Cardiovascular:  Positive for leg swelling. Negative for chest pain and palpitations.   Neurological:  Positive for tremors and weakness. Negative for syncope.   All other systems reviewed and are negative.    Objective:     Vital Signs (Most Recent):  Temp: 97.4 °F (36.3 °C) (07/19/25 1109)  Pulse: 61 (07/19/25 1113)  Resp: 17 (07/19/25 1109)  BP: 134/65 (07/19/25 1109)  SpO2: 98 % (07/19/25 1109) Vital Signs (24h Range):  Temp:  [97.3 °F (36.3 °C)-98.9 °F (37.2 °C)] 97.4 °F (36.3 °C)  Pulse:  [57-75] 61  Resp:  [15-20] 17  SpO2:  [95 %-100 %] 98 %  BP: (105-145)/(51-74) 134/65     Weight: 74.8 kg (165 lb)  Body mass index is 27.46 kg/m².    Intake/Output Summary (Last 24 hours) at 7/19/2025 1221  Last data filed at 7/19/2025 0441  Gross per 24 hour   Intake 300 ml   Output 900 ml   Net -600 ml         Physical Exam  Vitals reviewed.   Constitutional:       General: She is not in acute distress.     Appearance: Normal appearance. She is normal weight. She is not ill-appearing, toxic-appearing or diaphoretic.   HENT:      Head: Normocephalic and atraumatic.      Nose: Nose normal.      Mouth/Throat:      Mouth: Mucous membranes are moist.      Pharynx: No oropharyngeal exudate.   Eyes:      General: No scleral icterus.        Right eye: No discharge.         Left eye: No discharge.      Extraocular Movements: Extraocular movements intact.      Conjunctiva/sclera: Conjunctivae normal.   Neck:      Thyroid: No  thyromegaly.      Vascular: No JVD.      Trachea: No tracheal deviation.   Cardiovascular:      Comments: JVD  Pulmonary:      Effort: Pulmonary effort is normal. No respiratory distress.      Breath sounds: No wheezing or rales.   Abdominal:      General: Abdomen is flat.   Musculoskeletal:         General: No deformity.      Right lower leg: Edema present.      Left lower leg: Edema present.   Skin:     Coloration: Skin is not pale.   Neurological:      Mental Status: She is alert and oriented to person, place, and time. Mental status is at baseline.      Cranial Nerves: No cranial nerve deficit.      Motor: No abnormal muscle tone.               Significant Labs: All pertinent labs within the past 24 hours have been reviewed.    Significant Imaging: I have reviewed all pertinent imaging results/findings within the past 24 hours.      Assessment & Plan  Acute on chronic heart failure with preserved ejection fraction  Endorses SOB on exertion.  Elevated BNP above baseline.   On exam: rales, wheezes, BLE pitting edema, JVD  Last echo in 2023 - LVEF of 55% with indeterminate diastolic dysfunction  Continue IV lasix with goal net negative 2-3 L daily  Fluid restriction < 1.2L daily  Ambulatory dysfunction  Falls  Likely related to progressive tremors.  Traumatic evaluation showed no fracture or misalignment.  Neurology consulted - MRI brain, B1 pending  PT/OT evaluation - low intensity, RW, BSC  Primary hypertension  Patient's blood pressure range in the last 24 hours was: BP  Min: 105/51  Max: 145/65.The patient's inpatient anti-hypertensive regimen is listed below:  Current Antihypertensives  furosemide injection 80 mg, Once, Intravenous  furosemide injection 80 mg, Once, Intravenous  Not on any BP meds  Plan  - BP is controlled, no changes needed to their regimen  Chronic a-fib  Patient has long standing persistent (>12 months) atrial fibrillation. Patient is currently in atrial fibrillation. [id number] Score: 3.  The patients heart rate in the last 24 hours is as follows:  Pulse  Min: 57  Max: 75     Antiarrhythmics       Anticoagulants  heparin (porcine) injection 5,000 Units, Every 8 hours, Subcutaneous    Plan  - Replete lytes with a goal of K>4, Mg >2  - Patient is not anticoagulated due to watchman's device  - Patient's afib is currently controlled  Hyperkalemia  Hyperkalemia is likely due to Medication induced.The patients most recent potassium results are listed below.  Recent Labs     07/17/25 2210 07/18/25  0626 07/19/25  0354   K 5.0 5.1 5.1     Plan  - Monitor for arrhythmias with EKG and/or continuous telemetry.   - Treat the hyperkalemia with Potassium Binders and Furosemide.   - Monitor potassium: Every 12 hours  - The patient's hyperkalemia is resolved  Tremor of unknown origin  Reports worsening tremors initially involving lower extremities and now affecting the upper extremities.  Likely etiology of new falls  Will consult neurology inpatient to evaluate for movement disorder since she has missed prior neurology appt.  May be a candidate for a dopaminergic agent trial.  Neurology consulted. Appreciate recs  Hyponatremia  Hyponatremia is likely due to Heart Failure. The patient's most recent sodium results are listed below.  Recent Labs     07/17/25 2210 07/18/25 0626 07/19/25  0354   * 128* 129*     Plan  - Correct the sodium by 4-6mEq in 24 hours.   - Obtain the following studies: Urine sodium, urine osmolality, serum osmolality.  - Will treat the hyponatremia -  Fluid restriction of:  1.2 liter per day, diuresis  - Monitor sodium Daily.   - Patient hyponatremia is stable  VTE Risk Mitigation (From admission, onward)           Ordered     heparin (porcine) injection 5,000 Units  Every 8 hours         07/17/25 1818     IP VTE HIGH RISK PATIENT  Once         07/17/25 1818     Place sequential compression device  Until discontinued         07/17/25 1818                    Discharge Planning   GILES:  7/20/2025     Code Status: Full Code   Medical Readiness for Discharge Date:   Discharge Plan A: Home with family                        Nely Chahal MD  Department of Hospital Medicine   [facility]

## 2025-07-19 NOTE — ASSESSMENT & PLAN NOTE
Likely related to progressive tremors.  Traumatic evaluation showed no fracture or misalignment.  Neurology consulted - MRI brain, B1 pending  PT/OT evaluation - low intensity, RW, BSC

## 2025-07-19 NOTE — PLAN OF CARE
Nutritional studies sent. B12 WNL. Would also send folate.    Pending MRI brain w/o contrast. We will continue to follow for results. Contact us with questions.    Discussed with Dr. Salvador.

## 2025-07-19 NOTE — PLAN OF CARE
Problem: Infection  Goal: Absence of Infection Signs and Symptoms  Outcome: Not Progressing     Problem: Adult Inpatient Plan of Care  Goal: Plan of Care Review  Outcome: Not Progressing  Goal: Patient-Specific Goal (Individualized)  Outcome: Not Progressing  Goal: Absence of Hospital-Acquired Illness or Injury  Outcome: Not Progressing  Goal: Optimal Comfort and Wellbeing  Outcome: Not Progressing  Goal: Readiness for Transition of Care  Outcome: Not Progressing     Problem: Skin Injury Risk Increased  Goal: Skin Health and Integrity  Outcome: Not Progressing     Problem: Wound  Goal: Optimal Coping  Outcome: Not Progressing  Goal: Optimal Functional Ability  Outcome: Not Progressing  Goal: Absence of Infection Signs and Symptoms  Outcome: Not Progressing  Goal: Improved Oral Intake  Outcome: Not Progressing  Goal: Optimal Pain Control and Function  Outcome: Not Progressing  Goal: Skin Health and Integrity  Outcome: Not Progressing  Goal: Optimal Wound Healing  Outcome: Not Progressing     Problem: Fall Injury Risk  Goal: Absence of Fall and Fall-Related Injury  Outcome: Not Progressing

## 2025-07-19 NOTE — ASSESSMENT & PLAN NOTE
Hyponatremia is likely due to Heart Failure. The patient's most recent sodium results are listed below.  Recent Labs     07/17/25  2210 07/18/25  0626 07/19/25  0354   * 128* 129*     Plan  - Correct the sodium by 4-6mEq in 24 hours.   - Obtain the following studies: Urine sodium, urine osmolality, serum osmolality.  - Will treat the hyponatremia -  Fluid restriction of:  1.2 liter per day, diuresis  - Monitor sodium Daily.   - Patient hyponatremia is stable

## 2025-07-19 NOTE — ASSESSMENT & PLAN NOTE
Hyperkalemia is likely due to Medication induced.The patients most recent potassium results are listed below.  Recent Labs     07/17/25  2210 07/18/25  0626 07/19/25  0354   K 5.0 5.1 5.1     Plan  - Monitor for arrhythmias with EKG and/or continuous telemetry.   - Treat the hyperkalemia with Potassium Binders and Furosemide.   - Monitor potassium: Every 12 hours  - The patient's hyperkalemia is resolved

## 2025-07-19 NOTE — PLAN OF CARE
07/19/25 1252   Rounds   Attendance Nurse ;Charge nurse   Discharge Plan A Home with family   Why the patient remains in the hospital Requires continued medical care   Transition of Care Barriers None     Reviewed with charge GILES pushed needs therapy luke Caballero RN  Case Management  [phone]

## 2025-07-20 LAB
ALBUMIN SERPL BCP-MCNC: 4.1 G/DL (ref 3.5–5.2)
ALP SERPL-CCNC: 99 UNIT/L (ref 40–150)
ALT SERPL W/O P-5'-P-CCNC: 16 UNIT/L (ref 10–44)
ANION GAP (OHS): 9 MMOL/L (ref 8–16)
AST SERPL-CCNC: 16 UNIT/L (ref 11–45)
BILIRUB SERPL-MCNC: 0.6 MG/DL (ref 0.1–1)
BUN SERPL-MCNC: 63 MG/DL (ref 10–30)
CALCIUM SERPL-MCNC: 9.2 MG/DL (ref 8.7–10.5)
CHLORIDE SERPL-SCNC: 100 MMOL/L (ref 95–110)
CO2 SERPL-SCNC: 21 MMOL/L (ref 23–29)
CREAT SERPL-MCNC: 2 MG/DL (ref 0.5–1.4)
GFR SERPLBLD CREATININE-BSD FMLA CKD-EPI: 23 ML/MIN/1.73/M2
GLUCOSE SERPL-MCNC: 94 MG/DL (ref 70–110)
MAGNESIUM SERPL-MCNC: 2.1 MG/DL (ref 1.6–2.6)
POTASSIUM SERPL-SCNC: 5.8 MMOL/L (ref 3.5–5.1)
POTASSIUM SERPL-SCNC: 5.8 MMOL/L (ref 3.5–5.1)
PROT SERPL-MCNC: 6.9 GM/DL (ref 6–8.4)
SODIUM SERPL-SCNC: 130 MMOL/L (ref 136–145)

## 2025-07-20 PROCEDURE — 99232 SBSQ HOSP IP/OBS MODERATE 35: CPT | Mod: HCNC,GC,, | Performed by: PSYCHIATRY & NEUROLOGY

## 2025-07-20 PROCEDURE — 94761 N-INVAS EAR/PLS OXIMETRY MLT: CPT | Mod: HCNC

## 2025-07-20 PROCEDURE — 63600175 PHARM REV CODE 636 W HCPCS: Mod: HCNC | Performed by: HOSPITALIST

## 2025-07-20 PROCEDURE — 25000003 PHARM REV CODE 250: Mod: HCNC | Performed by: HOSPITALIST

## 2025-07-20 PROCEDURE — 63600175 PHARM REV CODE 636 W HCPCS: Mod: HCNC | Performed by: INTERNAL MEDICINE

## 2025-07-20 PROCEDURE — 21400001 HC TELEMETRY ROOM: Mod: HCNC

## 2025-07-20 PROCEDURE — 36415 COLL VENOUS BLD VENIPUNCTURE: CPT | Mod: HCNC | Performed by: INTERNAL MEDICINE

## 2025-07-20 PROCEDURE — 80053 COMPREHEN METABOLIC PANEL: CPT | Mod: HCNC | Performed by: INTERNAL MEDICINE

## 2025-07-20 PROCEDURE — 25000003 PHARM REV CODE 250: Mod: HCNC | Performed by: INTERNAL MEDICINE

## 2025-07-20 PROCEDURE — 84132 ASSAY OF SERUM POTASSIUM: CPT | Mod: HCNC | Performed by: HOSPITALIST

## 2025-07-20 PROCEDURE — 83735 ASSAY OF MAGNESIUM: CPT | Mod: HCNC | Performed by: INTERNAL MEDICINE

## 2025-07-20 PROCEDURE — 36415 COLL VENOUS BLD VENIPUNCTURE: CPT | Mod: HCNC | Performed by: HOSPITALIST

## 2025-07-20 RX ORDER — FUROSEMIDE 10 MG/ML
120 INJECTION INTRAMUSCULAR; INTRAVENOUS ONCE
Status: COMPLETED | OUTPATIENT
Start: 2025-07-20 | End: 2025-07-20

## 2025-07-20 RX ORDER — CARBIDOPA AND LEVODOPA 25; 100 MG/1; MG/1
TABLET ORAL
Qty: 74 TABLET | Refills: 0 | Status: SHIPPED | OUTPATIENT
Start: 2025-07-20

## 2025-07-20 RX ORDER — SODIUM CHLORIDE 0.9 % (FLUSH) 0.9 %
10 SYRINGE (ML) INJECTION
Status: DISCONTINUED | OUTPATIENT
Start: 2025-07-20 | End: 2025-07-22 | Stop reason: HOSPADM

## 2025-07-20 RX ORDER — CARBIDOPA AND LEVODOPA 25; 100 MG/1; MG/1
TABLET ORAL
Qty: 4 TABLET | Refills: 0 | Status: SHIPPED | OUTPATIENT
Start: 2025-07-20 | End: 2025-07-20

## 2025-07-20 RX ORDER — FUROSEMIDE 40 MG/1
TABLET ORAL
Qty: 60 TABLET | Refills: 1 | Status: SHIPPED | OUTPATIENT
Start: 2025-07-20 | End: 2025-07-22

## 2025-07-20 RX ADMIN — HEPARIN SODIUM 5000 UNITS: 5000 INJECTION INTRAVENOUS; SUBCUTANEOUS at 02:07

## 2025-07-20 RX ADMIN — LIDOCAINE 1 PATCH: 50 PATCH CUTANEOUS at 08:07

## 2025-07-20 RX ADMIN — ACETAMINOPHEN 1000 MG: 500 TABLET ORAL at 09:07

## 2025-07-20 RX ADMIN — HEPARIN SODIUM 5000 UNITS: 5000 INJECTION INTRAVENOUS; SUBCUTANEOUS at 09:07

## 2025-07-20 RX ADMIN — FUROSEMIDE 120 MG: 10 INJECTION, SOLUTION INTRAVENOUS at 12:07

## 2025-07-20 RX ADMIN — SODIUM ZIRCONIUM CYCLOSILICATE 10 G: 10 POWDER, FOR SUSPENSION ORAL at 12:07

## 2025-07-20 RX ADMIN — SODIUM ZIRCONIUM CYCLOSILICATE 10 G: 10 POWDER, FOR SUSPENSION ORAL at 02:07

## 2025-07-20 RX ADMIN — ACETAMINOPHEN 1000 MG: 500 TABLET ORAL at 02:07

## 2025-07-20 RX ADMIN — MUSCLE RUB CREAM: 100; 150 CREAM TOPICAL at 09:07

## 2025-07-20 RX ADMIN — CARBIDOPA AND LEVODOPA 1 SPLIT TABLET: 25; 100 TABLET ORAL at 12:07

## 2025-07-20 RX ADMIN — PRAVASTATIN SODIUM 40 MG: 20 TABLET ORAL at 08:07

## 2025-07-20 RX ADMIN — ACETAMINOPHEN 1000 MG: 500 TABLET ORAL at 08:07

## 2025-07-20 RX ADMIN — HEPARIN SODIUM 5000 UNITS: 5000 INJECTION INTRAVENOUS; SUBCUTANEOUS at 05:07

## 2025-07-20 RX ADMIN — ASPIRIN 81 MG: 81 TABLET, COATED ORAL at 08:07

## 2025-07-20 RX ADMIN — MUSCLE RUB CREAM: 100; 150 CREAM TOPICAL at 03:07

## 2025-07-20 NOTE — PLAN OF CARE
"[facility]      HOME HEALTH ORDERS  FACE TO FACE ENCOUNTER    Patient Name: Jenniffer Jimenez  YOB: 1932    PCP: Reed Ruiz MD   PCP Address: [address] [facility] / [place] [address]  PCP Phone Number: [phone]  PCP Fax: [phone]    Encounter Date: 7/17/25    Admit to Home Health    Diagnoses:  Active Hospital Problems    Diagnosis  POA    *Acute on chronic heart failure with preserved ejection fraction [I50.33]  Yes    Tremor of unknown origin [R25.1]  Yes    Ambulatory dysfunction [R26.2]  Yes    Falls [R29.6]  Not Applicable    Hyponatremia [E87.1]  Yes    Hyperkalemia [E87.5]  Yes    Chronic a-fib [I48.20]  Yes     Chronic    Primary hypertension [I10]  Yes     Chronic      Resolved Hospital Problems   No resolved problems to display.       Follow Up Appointments:  Future Appointments   Date Time Provider Department Center   8/8/2025  9:30 AM Brisa Bull NP [facility] IM [place]   8/12/2025  9:20 AM Mannie Camacho MD [facility] NEUMOV [facility]   10/15/2025 11:00 AM Mraia Pedraza OD [facility] OPTICLB [facility]       Allergies:  Review of patient's allergies indicates:   Allergen Reactions    Opioids - morphine analogues Other (See Comments)     Bowel issues; bowel obstruction    Tizanidine Other (See Comments)     "Lips were numb, almost passed out."    Tramadol Hallucinations    Morphine     Opioids-meperidine and related     Ciprofloxacin Rash       Medications: Review discharge medications with patient and family and provide education.    Current Medications[1]     Medication List        START taking these medications      carbidopa-levodopa  mg  mg per tablet  Commonly known as: SINEMET  Week 1: 1/2 tab around breakfast. Week 2: 1/2 tab around breakfast and lunch. Week 3: 1/2 tab around breakfast, lunch and dinner. Week 4: 1 tab around breakfast and 1/2 tab around lunch and dinner. Week 5: 1 tab around breakfast and lunch and 1/2 tab around dinner. Week 6: 1 " tab around breakfast, lunch and dinner.            CHANGE how you take these medications      furosemide 40 MG tablet  Commonly known as: LASIX  Take 40 mg daily for weight 155-159, take 40 mg twice a day for weights 160 over, none for weights less than 155  What changed:   medication strength  additional instructions            CONTINUE taking these medications      aspirin 81 MG EC tablet  Commonly known as: ECOTRIN  Take 81 mg by mouth once daily.     estradioL 0.01 % (0.1 mg/gram) vaginal cream  Commonly known as: ESTRACE  Place 1 g vaginally 3 (three) times a week.     pravastatin 40 MG tablet  Commonly known as: PRAVACHOL  Take 1 tablet (40 mg total) by mouth once daily.     psyllium 0.52 gram capsule  Take 3 capsules by mouth 3 (three) times daily as needed (constipation).     spironolactone 25 MG tablet  Commonly known as: ALDACTONE  Take 1 tablet (25 mg total) by mouth 2 (two) times daily.                I have seen and examined this patient within the last 30 days. My clinical findings that support the need for the home health skilled services and home bound status are the following:no   Weakness/numbness causing balance and gait disturbance due to Heart Failure and Weakness/Debility making it taxing to leave home.  Medical restrictions requiring assistance of another human to leave home due to  Dyspnea on exertion (SOB), Fluid/volume overload, and Unstable ambulation.     Diet:   cardiac diet, fluid restriction, and 2 gram sodium diet    Labs:  SN to perform labs:  BMP: Weekly; 2 week(s) and Report Lab results to PCP.    Referrals/ Consults  Physical Therapy to evaluate and treat. Evaluate for home safety and equipment needs; Establish/upgrade home exercise program. Perform / instruct on therapeutic exercises, gait training, transfer training, and Range of Motion.  Occupational Therapy to evaluate and treat. Evaluate home environment for safety and equipment needs. Perform/Instruct on transfers, ADL  training, ROM, and therapeutic exercises.  Aide to provide assistance with personal care, ADLs, and vital signs.    Activities:   activity as tolerated    Nursing:   Agency to admit patient within 24 hours of hospital discharge unless specified on physician order or at patient request    SN to complete comprehensive assessment including routine vital signs. Instruct on disease process and s/s of complications to report to MD. Review/verify medication list sent home with the patient at time of discharge  and instruct patient/caregiver as needed. Frequency may be adjusted depending on start of care date.     Skilled nurse to perform up to 3 visits PRN for symptoms related to diagnosis    Notify MD if SBP > 160 or < 90; DBP > 90 or < 50; HR > 120 or < 50; Temp > 101; O2 < 88%; Other:       Ok to schedule additional visits based on staff availability and patient request on consecutive days within the home health episode.    When multiple disciplines ordered:    Start of Care occurs on Sunday - Wednesday schedule remaining discipline evaluations as ordered on separate consecutive days following the start of care.    Thursday SOC -schedule subsequent evaluations Friday and Monday the following week.     Friday - Saturday SOC - schedule subsequent discipline evaluations on consecutive days starting Monday of the following week.    For all post-discharge communication and subsequent orders please contact patient's primary care physician. If unable to reach primary care physician or do not receive response within 30 minutes, please contact  for clinical staff order clarification    Miscellaneous   Heart Failure:      SN to instruct on the following:    Instruct on the definition of CHF.   Instruct on the signs/sympoms of CHF to be reported.   Instruct on and monitor daily weights.   Instruct on factors that cause exacerbation.   Instruct on action, dose, schedule, and side effects of medications.   Instruct on diet as  prescribed.   Instruct on activity allowed.   Instruct on life-style modifications for life long management of CHF   SN to assess compliance with daily weights, diet, medications, fluid retention,    safety precautions, activities permitted and life-style modifications.   Additional 1-2 SN visits per week as needed for signs and symptoms     of CHF exacerbation.      For Weight Gain > 2-3 lbs in 1 day or 4-6 lbs over 1 week notify PCP:  See prescription bottle instructions    Home Health Aide:  Nursing Weekly, Physical Therapy Three times weekly, Occupational Therapy Three times weekly, and Home Health Aide Weekly    Wound Care Orders  no    I certify that this patient is confined to her home and needs intermittent skilled nursing care, physical therapy, and occupational therapy.               [1]   Current Facility-Administered Medications   Medication Dose Route Frequency Provider Last Rate Last Admin    acetaminophen tablet 1,000 mg  1,000 mg Oral TID Nely Chahal MD   1,000 mg at 07/20/25 0805    aspirin EC tablet 81 mg  81 mg Oral Daily Marquise Scruggs MD   81 mg at 07/20/25 0805    carbidopa-levodopa 12.5-50 mg split tablet 1 split tablet  1 split tablet Oral QAM Nely Chahal MD   1 split tablet at 07/20/25 1257    dextrose 50% injection 12.5 g  12.5 g Intravenous PRN Marquise Scruggs MD        dextrose 50% injection 25 g  25 g Intravenous PRN Marquise Scruggs MD        diclofenac sodium 1 % gel 2 g  2 g Topical (Top) PRN Nely Chahal MD        glucagon (human recombinant) injection 1 mg  1 mg Intramuscular PRN Marquise Scruggs MD        glucose chewable tablet 16 g  16 g Oral PRN Marquise Scruggs MD        glucose chewable tablet 24 g  24 g Oral PRN Marquise Scruggs MD        heparin (porcine) injection 5,000 Units  5,000 Units Subcutaneous Q8H Marquise Scruggs MD   5,000 Units at 07/20/25 0536    LIDOcaine 5 % patch 1 patch  1 patch Transdermal Daily Nely Chahal  MD   1 patch at 07/20/25 0806    melatonin tablet 6 mg  6 mg Oral Nightly PRN Marquise Scruggs MD   6 mg at 07/18/25 2103    methyl salicylate-menthol 15-10% cream   Topical (Top) TID Anette Pereyra, NP   Given at 07/20/25 0900    naloxone 0.4 mg/mL injection 0.02 mg  0.02 mg Intravenous PRN Marquise Scruggs MD        pravastatin tablet 40 mg  40 mg Oral Daily Marquise Scruggs MD   40 mg at 07/20/25 0805    sodium chloride 0.9% flush 10 mL  10 mL Intravenous Q12H PRN Marquise Scruggs MD        sodium chloride 0.9% flush 10 mL  10 mL Intravenous PRN Nely Chahal MD        sodium zirconium cyclosilicate packet 10 g  10 g Oral TID Nely Chahal MD   10 g at 07/20/25 1248

## 2025-07-20 NOTE — ASSESSMENT & PLAN NOTE
Hyponatremia is likely due to Heart Failure. The patient's most recent sodium results are listed below.  Recent Labs     07/18/25  0626 07/19/25  0354 07/20/25  0228   * 129* 130*     Plan  - Correct the sodium by 4-6mEq in 24 hours.   - Obtain the following studies: Urine sodium, urine osmolality, serum osmolality.  - Will treat the hyponatremia -  Fluid restriction of:  1.2 liter per day, diuresis  - Monitor sodium Daily.   - Patient hyponatremia is improving

## 2025-07-20 NOTE — PROGRESS NOTES
[facility] Medicine  Progress Note    Patient Name: Jenniffer Jimenez  MRN: [id number]  Patient Class: IP- Inpatient   Admission Date: 7/17/2025  Length of Stay: 2 days  Attending Physician: Nely Chahal MD  Primary Care Provider: Reed Ruiz MD        Subjective     Principal Problem:Acute on chronic heart failure with preserved ejection fraction        HPI:  Ms. Abad is a 89 year female medical history significant for atrial fibrillation status post Watchman, CKD, hypertension, T2DM, and new tremor disorder who presented to the ER following falls at home.  Of note, patient fell at home about 2 days ago.  She had 2 fall episodes in the span of 2 days. She reports that the tremors have worsened  which caused her to fall. She denies head strikes or bleeding from any orifices. '    She endorses left knee pain.  She is seen at bedside with her daughter.  Patient is extremely hard of hearing.  States her major concern is the frequent falls as well as tremors.  She reports that the tremors have worsened.  She is scheduled with neurology on August 12.      In the ER, she was noted to be hemodynamically stable.  CBC revealed hemoglobin of 9, WBC count of 3.9 and platelet count of 118.  CMP with sodium of 128, potassium of 5.6, chloride of 99, bicarb of 20, BUN of 53, creatinine of 1.9 glucose of 17, magnesium of 2.0. Troponin was negative.  BNP of 273. Urinalysis with trace leukocyte esterase and unremarkable microscopy.   Traumatic evaluation with x-ray of the right humerus, chest, bilateral hips, bilateral knees, CT head, CT cervical spine, CT lumbar spine were all negative.    She received lasix 80 mg and lokelma in the ER.        Overview/Hospital Course:  Presented with progressive upper and lower extremity tremors with multiple falls at home. Neurology and PTOT were consulted. MRI brain ordered; B1 pending. Patient also found to be in acute CHF and associated hyponatremia, requiring IV Lasix. DVT  u/s ordered for R leg pain.     Interval History:  did not sleep much night due to frequent urination. No acute complaints this AM. Still overloaded on exam - continue IV diuresis. DVT u/s pending although she reports her RLE feels a bit better today. Neuro starting sinemet trial. Lokelma added for hyperkalemia    Review of Systems   Constitutional:  Positive for activity change and fatigue. Negative for appetite change, chills, diaphoresis, fever and unexpected weight change.   Respiratory:  Positive for shortness of breath. Negative for apnea, cough, choking, chest tightness, wheezing and stridor.    Cardiovascular:  Positive for leg swelling. Negative for chest pain and palpitations.   Neurological:  Positive for tremors and weakness. Negative for syncope.   All other systems reviewed and are negative.    Objective:     Vital Signs (Most Recent):  Temp: 98.1 °F (36.7 °C) (07/20/25 0727)  Pulse: 65 (07/20/25 0727)  Resp: 16 (07/20/25 0727)  BP: (!) 116/56 (07/20/25 0727)  SpO2: 96 % (07/20/25 0727) Vital Signs (24h Range):  Temp:  [97.4 °F (36.3 °C)-98.1 °F (36.7 °C)] 98.1 °F (36.7 °C)  Pulse:  [60-70] 65  Resp:  [15-18] 16  SpO2:  [92 %-99 %] 96 %  BP: ()/(56-79) 116/56     Weight: 74.8 kg (165 lb)  Body mass index is 27.46 kg/m².  No intake or output data in the 24 hours ending 07/20/25 1202        Physical Exam  Vitals reviewed.   Constitutional:       General: She is not in acute distress.     Appearance: Normal appearance. She is normal weight. She is not ill-appearing, toxic-appearing or diaphoretic.   HENT:      Head: Normocephalic and atraumatic.      Nose: Nose normal.      Mouth/Throat:      Mouth: Mucous membranes are moist.      Pharynx: No oropharyngeal exudate.   Eyes:      General: No scleral icterus.        Right eye: No discharge.         Left eye: No discharge.      Extraocular Movements: Extraocular movements intact.      Conjunctiva/sclera: Conjunctivae normal.   Neck:      Thyroid: No  thyromegaly.      Vascular: No JVD.      Trachea: No tracheal deviation.   Cardiovascular:      Comments: JVD  Pulmonary:      Effort: Pulmonary effort is normal. No respiratory distress.      Breath sounds: Wheezing present. No rales.   Abdominal:      General: Abdomen is flat.   Musculoskeletal:         General: No deformity.      Right lower leg: Edema present.      Left lower leg: Edema present.   Skin:     Coloration: Skin is not pale.   Neurological:      Mental Status: She is alert and oriented to person, place, and time. Mental status is at baseline.      Cranial Nerves: No cranial nerve deficit.      Motor: No abnormal muscle tone.               Significant Labs: All pertinent labs within the past 24 hours have been reviewed.    Significant Imaging: I have reviewed all pertinent imaging results/findings within the past 24 hours.      Assessment & Plan  Acute on chronic heart failure with preserved ejection fraction  Endorses SOB on exertion.  Elevated BNP above baseline.   On exam: rales, wheezes, BLE pitting edema, JVD  Last echo in 2023 - LVEF of 55% with indeterminate diastolic dysfunction  Continue IV lasix with goal net negative 2-3 L daily  Fluid restriction < 1.2L daily  Ambulatory dysfunction  Falls  Likely related to progressive tremors.  Traumatic evaluation showed no fracture or misalignment.  Neurology consulted  PT/OT evaluation - low intensity, RW, BSC  Primary hypertension  Patient's blood pressure range in the last 24 hours was: BP  Min: 99/71  Max: 140/63.The patient's inpatient anti-hypertensive regimen is listed below:  Current Antihypertensives  furosemide injection 120 mg, Once, Intravenous  Not on any BP meds  Plan  - BP is controlled, no changes needed to their regimen  Chronic a-fib  Patient has long standing persistent (>12 months) atrial fibrillation. Patient is currently in atrial fibrillation. [id number] Score: 3. The patients heart rate in the last 24 hours is as follows:  Pulse   Min: 60  Max: 70     Antiarrhythmics       Anticoagulants  heparin (porcine) injection 5,000 Units, Every 8 hours, Subcutaneous    Plan  - Replete lytes with a goal of K>4, Mg >2  - Patient is not anticoagulated due to watchman's device  - Patient's afib is currently controlled  Hyperkalemia  Hyperkalemia is likely due to Medication induced.The patients most recent potassium results are listed below.  Recent Labs     07/19/25  0354 07/20/25  0228 07/20/25  1047   K 5.1 5.8* 5.8*     Plan  - Monitor for arrhythmias with EKG and/or continuous telemetry.   - Treat the hyperkalemia with Potassium Binders and Furosemide.   - Monitor potassium: Every 12 hours  - The patient's hyperkalemia is worsening. Will add lokelma  Tremor of unknown origin  Reports worsening tremors initially involving lower extremities and now affecting the upper extremities.  Likely etiology of new falls  Will consult neurology inpatient to evaluate for movement disorder since she has missed prior neurology appt.  Neurology consulted - - MRI brain w/o acute findings, B1 pending. Starting on sinemet trial with prolonged ramp and Neurology f/u  Hyponatremia  Hyponatremia is likely due to Heart Failure. The patient's most recent sodium results are listed below.  Recent Labs     07/18/25  0626 07/19/25  0354 07/20/25  0228   * 129* 130*     Plan  - Correct the sodium by 4-6mEq in 24 hours.   - Obtain the following studies: Urine sodium, urine osmolality, serum osmolality.  - Will treat the hyponatremia -  Fluid restriction of:  1.2 liter per day, diuresis  - Monitor sodium Daily.   - Patient hyponatremia is improving  VTE Risk Mitigation (From admission, onward)           Ordered     heparin (porcine) injection 5,000 Units  Every 8 hours         07/17/25 1818     IP VTE HIGH RISK PATIENT  Once         07/17/25 1818     Place sequential compression device  Until discontinued         07/17/25 1818                    Discharge Planning   GILES:  7/21/2025     Code Status: Full Code   Medical Readiness for Discharge Date:   Discharge Plan A: Home with family                        Nely Chahal MD  Department of Hospital Medicine   [facility]

## 2025-07-20 NOTE — ASSESSMENT & PLAN NOTE
"89F. Hx. Afib s/p watchman on ASA, CKD, HTN, DM2. Px. for tremor and associated falls. She notes onset of tremor several months ago: began in the lower extremities. A "few days to weeks ago," she notes that the tremor began to involve her upper extremities. It is most notable when she is standing, or engaged in activity: i.e. postural and/or with intent. The tremor in the legs has lead to multiple falls, including two in the last two days. She denies any family history of tremor or alcohol use, denies any significant personal history of alcohol use. Since admission, AFVSS, labs notable for hyponatremia to 128 and hyperkalemia to 5.6, elevated BUN/CR (~baseline, c/w CKD). XR hips and LE w/o evidence of fracture. CT spine with degenerative disease, lumbar > cervical. CTH without acute findings. Neurology consulted for opinion on tremor.    MRI brain with generalized atrophy, enlarged Virchow Paul spaces, no significant subcortical lesion to explain issues w/ambulation. Examining her again today, we do note some parkinsonian characteristics: facial masking, bradykinesia w/finger tap, increased tone BL LEs, R > L. When stands up to walk, she exhibits "tremor," which consists of flailing movements of the LLE. Possible in clinical context that these movements may be a response to bradykinesia perceived as weakness. Anvik trying a gentle ramp of Sinemet and assess for response outpatient     Recommendations  -Sinemet ramp as below  ->Week 1: 1/2 tab around breakfast.  ->Week 2: 1/2 tab around breakfast and lunch.   ->Week 3: 1/2 tab around breakfast, lunch and dinner.   ->Week 4: 1 tab around breakfast and 1/2 tab around lunch and dinner.   ->Week 5: 1 tab around breakfast and lunch and 1/2 tab around dinner.   ->Week 6: 1 tab around breakfast, lunch and dinner. Do not increased to more than this until outpatient follow-up   -Follow up with myself and movement faculty in resident clinic: have messaged scheduling staff to " arrange  -PT/OT  -We will sign off  -Contact us with questions

## 2025-07-20 NOTE — PLAN OF CARE
07/20/25 1547   Post-Acute Status   Post-Acute Authorization Home Health   Patient choice form signed by patient/caregiver List from System Post-Acute Care   Discharge Delays None known at this time   Discharge Plan   Discharge Plan A Home Health   Discharge Plan B Home Health     Discharge Plan A and Plan B have been determined by review of patient's clinical status, future medical and therapeutic needs, and coverage/benefits for post-acute care in coordination with multidisciplinary team members.      Met with patient  to review discharge recommendation of HH and is agreeable to plan, states has had a company previously and would like to use them was unsure of name , asked CM to contact daughter to verify CM placed call to daughter went to , list provided at bedside for review.     Patient/family provided list of facilities in-network with patient's payor plan. Providers that are owned, operated, or affiliated with [facility] are included on the list.       4:12 PM  Cm called back into room patient [facility]  and would like to resume care with them.     Notified that referral sent to below listed facilities from in-network list based on proximity to home/family support:   [facility]   Pending verification from daughter     Patient/family instructed to identify preference.    Preferred Facility: (if more than 1, listed in order of descending preference)  [facility]     If an additional preferred facility not listed above is identified, additional referral to be sent. If above facilities unable to accept, will send additional referrals to in-network providers.      Rima Caballero RN  Case Management  [phone]

## 2025-07-20 NOTE — ASSESSMENT & PLAN NOTE
Hyperkalemia is likely due to Medication induced.The patients most recent potassium results are listed below.  Recent Labs     07/19/25  0354 07/20/25  0228 07/20/25  1047   K 5.1 5.8* 5.8*     Plan  - Monitor for arrhythmias with EKG and/or continuous telemetry.   - Treat the hyperkalemia with Potassium Binders and Furosemide.   - Monitor potassium: Every 12 hours  - The patient's hyperkalemia is worsening. Will add lokelma

## 2025-07-20 NOTE — ASSESSMENT & PLAN NOTE
Patient has long standing persistent (>12 months) atrial fibrillation. Patient is currently in atrial fibrillation. [id number] Score: 3. The patients heart rate in the last 24 hours is as follows:  Pulse  Min: 60  Max: 70     Antiarrhythmics       Anticoagulants  heparin (porcine) injection 5,000 Units, Every 8 hours, Subcutaneous    Plan  - Replete lytes with a goal of K>4, Mg >2  - Patient is not anticoagulated due to watchman's device  - Patient's afib is currently controlled

## 2025-07-20 NOTE — ASSESSMENT & PLAN NOTE
Reports worsening tremors initially involving lower extremities and now affecting the upper extremities.  Likely etiology of new falls  Will consult neurology inpatient to evaluate for movement disorder since she has missed prior neurology appt.  Neurology consulted - - MRI brain w/o acute findings, B1 pending. Starting on sinemet trial with prolonged ramp and Neurology f/u

## 2025-07-20 NOTE — PLAN OF CARE
Problem: Infection  Goal: Absence of Infection Signs and Symptoms  Outcome: Progressing     Problem: Adult Inpatient Plan of Care  Goal: Plan of Care Review  Outcome: Progressing  Goal: Patient-Specific Goal (Individualized)  Outcome: Progressing  Goal: Absence of Hospital-Acquired Illness or Injury  Outcome: Progressing  Goal: Optimal Comfort and Wellbeing  Outcome: Progressing  Goal: Readiness for Transition of Care  Outcome: Progressing     Problem: Skin Injury Risk Increased  Goal: Skin Health and Integrity  Outcome: Progressing     Problem: Wound  Goal: Optimal Coping  Outcome: Progressing  Goal: Optimal Functional Ability  Outcome: Progressing  Goal: Absence of Infection Signs and Symptoms  Outcome: Progressing  Goal: Improved Oral Intake  Outcome: Progressing  Goal: Optimal Pain Control and Function  Outcome: Progressing  Goal: Skin Health and Integrity  Outcome: Progressing  Goal: Optimal Wound Healing  Outcome: Progressing     Problem: Fall Injury Risk  Goal: Absence of Fall and Fall-Related Injury  Outcome: Progressing

## 2025-07-20 NOTE — SUBJECTIVE & OBJECTIVE
Interval History:  did not sleep much night due to frequent urination. No acute complaints this AM. Still overloaded on exam - continue IV diuresis. DVT u/s pending although she reports her RLE feels a bit better today. Neuro starting sinemet trial. Lokelma added for hyperkalemia    Review of Systems   Constitutional:  Positive for activity change and fatigue. Negative for appetite change, chills, diaphoresis, fever and unexpected weight change.   Respiratory:  Positive for shortness of breath. Negative for apnea, cough, choking, chest tightness, wheezing and stridor.    Cardiovascular:  Positive for leg swelling. Negative for chest pain and palpitations.   Neurological:  Positive for tremors and weakness. Negative for syncope.   All other systems reviewed and are negative.    Objective:     Vital Signs (Most Recent):  Temp: 98.1 °F (36.7 °C) (07/20/25 0727)  Pulse: 65 (07/20/25 0727)  Resp: 16 (07/20/25 0727)  BP: (!) 116/56 (07/20/25 0727)  SpO2: 96 % (07/20/25 0727) Vital Signs (24h Range):  Temp:  [97.4 °F (36.3 °C)-98.1 °F (36.7 °C)] 98.1 °F (36.7 °C)  Pulse:  [60-70] 65  Resp:  [15-18] 16  SpO2:  [92 %-99 %] 96 %  BP: ()/(56-79) 116/56     Weight: 74.8 kg (165 lb)  Body mass index is 27.46 kg/m².  No intake or output data in the 24 hours ending 07/20/25 1202        Physical Exam  Vitals reviewed.   Constitutional:       General: She is not in acute distress.     Appearance: Normal appearance. She is normal weight. She is not ill-appearing, toxic-appearing or diaphoretic.   HENT:      Head: Normocephalic and atraumatic.      Nose: Nose normal.      Mouth/Throat:      Mouth: Mucous membranes are moist.      Pharynx: No oropharyngeal exudate.   Eyes:      General: No scleral icterus.        Right eye: No discharge.         Left eye: No discharge.      Extraocular Movements: Extraocular movements intact.      Conjunctiva/sclera: Conjunctivae normal.   Neck:      Thyroid: No thyromegaly.      Vascular: No  JVD.      Trachea: No tracheal deviation.   Cardiovascular:      Comments: JVD  Pulmonary:      Effort: Pulmonary effort is normal. No respiratory distress.      Breath sounds: Wheezing present. No rales.   Abdominal:      General: Abdomen is flat.   Musculoskeletal:         General: No deformity.      Right lower leg: Edema present.      Left lower leg: Edema present.   Skin:     Coloration: Skin is not pale.   Neurological:      Mental Status: She is alert and oriented to person, place, and time. Mental status is at baseline.      Cranial Nerves: No cranial nerve deficit.      Motor: No abnormal muscle tone.               Significant Labs: All pertinent labs within the past 24 hours have been reviewed.    Significant Imaging: I have reviewed all pertinent imaging results/findings within the past 24 hours.

## 2025-07-20 NOTE — NURSING
I, Me'Cher have been in the patient's room every hour on the hour and whenever she was called to urinate and use the beside commode. When I was in another patient's room, I sent nurse Kath to her room and she went in immediately to help her. She hollered at Kath for taking too long and then fussed at us. We explained to her that we were in other patients' rooms and that we are not ignoring her. We offered several resolutions including using the purewick if someone doesn't come immediately to help her to the beside commode. Patient refused. Jam came into the room and saw both me and  Kath there. She called her daughter and stated that no one has been in her room all night. Her daughter called the unit and stated that her mom states no one has been in her room. I went into her room immediately about 1:30 am after the daughter called and she stated she told her daughter on us and that she now has to urinate again. I helped her to the commode and she continued to holler and fuss at me even though I was helping her. She stated she wanted to sit there for a few minutes. Kath checked on her around 1:40 am for me. She stated she was fine and wanted to continue to sit on the toilet. I checked on her again around 1:50, she stated she didn't want to get off the commode and back in bed. She said she is more comfortable on the toilet. Charge nurse Jam notified. Checked on patient again around 2:20 and 2:45 am, patient is still on the beside commode sittting and refusing to get in the bed. Patient stated she might be ready when I come to check in on her again. Charge nurse notified.   Around 3:45 am patient decided she wanted to get back in the back. Patient was assisted back into the bed with her call light and phone within her reach.

## 2025-07-20 NOTE — ASSESSMENT & PLAN NOTE
Patient's blood pressure range in the last 24 hours was: BP  Min: 99/71  Max: 140/63.The patient's inpatient anti-hypertensive regimen is listed below:  Current Antihypertensives  furosemide injection 120 mg, Once, Intravenous  Not on any BP meds  Plan  - BP is controlled, no changes needed to their regimen

## 2025-07-20 NOTE — SUBJECTIVE & OBJECTIVE
Subjective:     Interval History: see hospital course    Current Neurological Medications: see below    Current Medications[1]    Review of Systems   Neurological:  Positive for weakness.     Objective:     Vital Signs (Most Recent):  Temp: 98.1 °F (36.7 °C) (07/20/25 0727)  Pulse: 65 (07/20/25 0727)  Resp: 16 (07/20/25 0727)  BP: (!) 116/56 (07/20/25 0727)  SpO2: 96 % (07/20/25 0727) Vital Signs (24h Range):  Temp:  [97.4 °F (36.3 °C)-98.1 °F (36.7 °C)] 98.1 °F (36.7 °C)  Pulse:  [60-70] 65  Resp:  [15-18] 16  SpO2:  [92 %-99 %] 96 %  BP: ()/(56-79) 116/56     Weight: 74.8 kg (165 lb)  Body mass index is 27.46 kg/m².     Physical Exam  Vitals and nursing note reviewed.   Constitutional:       Appearance: She is ill-appearing.   HENT:      Head: Normocephalic and atraumatic.   Cardiovascular:      Rate and Rhythm: Normal rate and regular rhythm.   Pulmonary:      Effort: Pulmonary effort is normal.   Skin:     General: Skin is warm and dry.   Neurological:      Comments: Alert. Oriented to person, hospital.    Cranial nerves grossly intact. Hypomimia.    Upper extremities  -Strength 5-/5 RUE throughout, 4/5 LUE throughout (rotator cuff injury)  -Normal tone, no tremor in repose, little noted with intent/posture today  -Some bradykinesia, decrement BL w/fingertaps    Lower extremities  -Strength 4/5 BL LE throughout  -When stands with walker, walks 1-2 steps and makes flailing movements with the LLE  -Tone increased BL, R > L               Significant Labs: All pertinent lab results from the past 24 hours have been reviewed.    Significant Imaging: I have reviewed all pertinent imaging results/findings within the past 24 hours.       [1]   Current Facility-Administered Medications   Medication Dose Route Frequency Provider Last Rate Last Admin    acetaminophen tablet 1,000 mg  1,000 mg Oral TID Nely Chahal MD   1,000 mg at 07/20/25 0805    aspirin EC tablet 81 mg  81 mg Oral Daily Marquise Scruggs MD    81 mg at 07/20/25 0805    dextrose 50% injection 12.5 g  12.5 g Intravenous PRN Marquise Scruggs MD        dextrose 50% injection 25 g  25 g Intravenous PRN Marquise Scruggs MD        diclofenac sodium 1 % gel 2 g  2 g Topical (Top) PRN Nely Chahal MD        glucagon (human recombinant) injection 1 mg  1 mg Intramuscular PRN Marquise Scruggs MD        glucose chewable tablet 16 g  16 g Oral PRN Marquise Scruggs MD        glucose chewable tablet 24 g  24 g Oral PRN Marquise Scruggs MD        heparin (porcine) injection 5,000 Units  5,000 Units Subcutaneous Q8H Marquise Scruggs MD   5,000 Units at 07/20/25 0536    LIDOcaine 5 % patch 1 patch  1 patch Transdermal Daily Nely Chahal MD   1 patch at 07/20/25 0806    melatonin tablet 6 mg  6 mg Oral Nightly PRN Marquise Scruggs MD   6 mg at 07/18/25 2103    methyl salicylate-menthol 15-10% cream   Topical (Top) TID Anette Pereyra, NP   Given at 07/20/25 0900    naloxone 0.4 mg/mL injection 0.02 mg  0.02 mg Intravenous PRN Marquise Scruggs MD        pravastatin tablet 40 mg  40 mg Oral Daily Marquise Scruggs MD   40 mg at 07/20/25 0805    sodium chloride 0.9% flush 10 mL  10 mL Intravenous Q12H PRN Marquise Scruggs MD

## 2025-07-20 NOTE — PROGRESS NOTES
"[facility]  Neurology  Progress Note    Patient Name: Jenniffer Jimenez  MRN: [id number]  Admission Date: 7/17/2025  Hospital Length of Stay: 2 days  Code Status: Full Code   Attending Provider: Nely Cahhal MD  Primary Care Physician: Reed Ruiz MD   Principal Problem:Acute on chronic heart failure with preserved ejection fraction    HPI:   89F. Hx. Afib s/p watchman on ASA, CKD, HTN, DM2. Px. for tremor and associated falls. She notes onset of tremor several months ago: began in the lower extremities. A "few days to weeks ago," she notes that the tremor began to involve her upper extremities. It is most notable when she is standing, or engaged in activity: i.e. postural and/or with intent. The tremor in the legs has lead to multiple falls, including two in the last two days. She denies any family history of tremor or alcohol use, denies any significant personal history of alcohol use. Since admission, AFVSS, labs notable for hyponatremia to 128 and hyperkalemia to 5.6, elevated BUN/CR (~baseline, c/w CKD). XR hips and LE w/o evidence of fracture. CT spine with degenerative disease, lumbar > cervical. CTH without acute findings. Neurology consulted for opinion on tremor.    Overview/Hospital Course:  -7/20/25: MRI brain unimpressive        Subjective:     Interval History: see hospital course    Current Neurological Medications: see below    Current Medications[1]    Review of Systems   Neurological:  Positive for weakness.     Objective:     Vital Signs (Most Recent):  Temp: 98.1 °F (36.7 °C) (07/20/25 0727)  Pulse: 65 (07/20/25 0727)  Resp: 16 (07/20/25 0727)  BP: (!) 116/56 (07/20/25 0727)  SpO2: 96 % (07/20/25 0727) Vital Signs (24h Range):  Temp:  [97.4 °F (36.3 °C)-98.1 °F (36.7 °C)] 98.1 °F (36.7 °C)  Pulse:  [60-70] 65  Resp:  [15-18] 16  SpO2:  [92 %-99 %] 96 %  BP: ()/(56-79) 116/56     Weight: 74.8 kg (165 lb)  Body mass index is 27.46 kg/m².     Physical Exam  Vitals and nursing note " "reviewed.   Constitutional:       Appearance: She is ill-appearing.   HENT:      Head: Normocephalic and atraumatic.   Cardiovascular:      Rate and Rhythm: Normal rate and regular rhythm.   Pulmonary:      Effort: Pulmonary effort is normal.   Skin:     General: Skin is warm and dry.   Neurological:      Comments: Alert. Oriented to person, hospital.    Cranial nerves grossly intact. Hypomimia.    Upper extremities  -Strength 5-/5 RUE throughout, 4/5 LUE throughout (rotator cuff injury)  -Normal tone, no tremor in repose, little noted with intent/posture today  -Some bradykinesia, decrement BL w/fingertaps    Lower extremities  -Strength 4/5 BL LE throughout  -When stands with walker, walks 1-2 steps and makes flailing movements with the LLE  -Tone increased BL, R > L               Significant Labs: All pertinent lab results from the past 24 hours have been reviewed.    Significant Imaging: I have reviewed all pertinent imaging results/findings within the past 24 hours.    Assessment and Plan:     Tremor of unknown origin  89F. Hx. Afib s/p watchman on ASA, CKD, HTN, DM2. Px. for tremor and associated falls. She notes onset of tremor several months ago: began in the lower extremities. A "few days to weeks ago," she notes that the tremor began to involve her upper extremities. It is most notable when she is standing, or engaged in activity: i.e. postural and/or with intent. The tremor in the legs has lead to multiple falls, including two in the last two days. She denies any family history of tremor or alcohol use, denies any significant personal history of alcohol use. Since admission, AFVSS, labs notable for hyponatremia to 128 and hyperkalemia to 5.6, elevated BUN/CR (~baseline, c/w CKD). XR hips and LE w/o evidence of fracture. CT spine with degenerative disease, lumbar > cervical. CTH without acute findings. Neurology consulted for opinion on tremor.    MRI brain with generalized atrophy, enlarged Virchow Paul " "spaces, no significant subcortical lesion to explain issues w/ambulation. Examining her again today, we do note some parkinsonian characteristics: facial masking, bradykinesia w/finger tap, increased tone BL LEs, R > L. When stands up to walk, she exhibits "tremor," which consists of flailing movements of the LLE. Possible in clinical context that these movements may be a response to bradykinesia perceived as weakness. [place] trying a gentle ramp of Sinemet and assess for response outpatient     Recommendations  -Sinemet ramp as below  ->Week 1: 1/2 tab around breakfast.  ->Week 2: 1/2 tab around breakfast and lunch.   ->Week 3: 1/2 tab around breakfast, lunch and dinner.   ->Week 4: 1 tab around breakfast and 1/2 tab around lunch and dinner.   ->Week 5: 1 tab around breakfast and lunch and 1/2 tab around dinner.   ->Week 6: 1 tab around breakfast, lunch and dinner. Do not increased to more than this until outpatient follow-up   -Follow up with myself and movement faculty in resident clinic: have messaged scheduling staff to arrange  -PT/OT  -We will sign off  -Contact us with questions        VTE Risk Mitigation (From admission, onward)           Ordered     heparin (porcine) injection 5,000 Units  Every 8 hours         07/17/25 1818     IP VTE HIGH RISK PATIENT  Once         07/17/25 1818     Place sequential compression device  Until discontinued         07/17/25 1818                    Mayito Crowley DO  Neurology  [facility]       [1]   Current Facility-Administered Medications   Medication Dose Route Frequency Provider Last Rate Last Admin    acetaminophen tablet 1,000 mg  1,000 mg Oral TID Nely Chahal MD   1,000 mg at 07/20/25 0805    aspirin EC tablet 81 mg  81 mg Oral Daily Marquise Scruggs MD   81 mg at 07/20/25 0805    dextrose 50% injection 12.5 g  12.5 g Intravenous PRN Marquise Scruggs MD        dextrose 50% injection 25 g  25 g Intravenous PRN Marquise Scruggs MD        " diclofenac sodium 1 % gel 2 g  2 g Topical (Top) PRN Nely Chahal MD        glucagon (human recombinant) injection 1 mg  1 mg Intramuscular PRN Marquise Scruggs MD        glucose chewable tablet 16 g  16 g Oral PRN Marquise Scruggs MD        glucose chewable tablet 24 g  24 g Oral PRN Marquise Scruggs MD        heparin (porcine) injection 5,000 Units  5,000 Units Subcutaneous Q8H Marquise Scruggs MD   5,000 Units at 07/20/25 0536    LIDOcaine 5 % patch 1 patch  1 patch Transdermal Daily Nely Chahal MD   1 patch at 07/20/25 0806    melatonin tablet 6 mg  6 mg Oral Nightly PRN Marquise Scruggs MD   6 mg at 07/18/25 2103    methyl salicylate-menthol 15-10% cream   Topical (Top) TID Anette Pereyra, NP   Given at 07/20/25 0900    naloxone 0.4 mg/mL injection 0.02 mg  0.02 mg Intravenous PRN Marquise Scruggs MD        pravastatin tablet 40 mg  40 mg Oral Daily Marquise Scruggs MD   40 mg at 07/20/25 0805    sodium chloride 0.9% flush 10 mL  10 mL Intravenous Q12H PRN Marquise Scruggs MD

## 2025-07-20 NOTE — PLAN OF CARE
07/20/25 1233   Rounds   Attendance Nurse ;Charge nurse   Discharge Plan A Home Health;Home with family   Why the patient remains in the hospital Requires continued medical care   Transition of Care Barriers None      GILES of today pushed by provider    Rima Caballero RN  Case Management  [phone]

## 2025-07-21 ENCOUNTER — DOCUMENTATION ONLY (OUTPATIENT)
Dept: CARDIOLOGY | Facility: CLINIC | Age: OVER 89
End: 2025-07-21
Payer: MEDICARE

## 2025-07-21 DIAGNOSIS — R06.02 SOB (SHORTNESS OF BREATH): Primary | ICD-10-CM

## 2025-07-21 LAB
ANION GAP (OHS): 11 MMOL/L (ref 8–16)
BUN SERPL-MCNC: 60 MG/DL (ref 10–30)
CALCIUM SERPL-MCNC: 8.7 MG/DL (ref 8.7–10.5)
CHLORIDE SERPL-SCNC: 100 MMOL/L (ref 95–110)
CO2 SERPL-SCNC: 20 MMOL/L (ref 23–29)
CREAT SERPL-MCNC: 1.8 MG/DL (ref 0.5–1.4)
GFR SERPLBLD CREATININE-BSD FMLA CKD-EPI: 26 ML/MIN/1.73/M2
GLUCOSE SERPL-MCNC: 97 MG/DL (ref 70–110)
MAGNESIUM SERPL-MCNC: 2 MG/DL (ref 1.6–2.6)
POTASSIUM SERPL-SCNC: 4.7 MMOL/L (ref 3.5–5.1)
SODIUM SERPL-SCNC: 131 MMOL/L (ref 136–145)

## 2025-07-21 PROCEDURE — 63600175 PHARM REV CODE 636 W HCPCS: Mod: HCNC | Performed by: INTERNAL MEDICINE

## 2025-07-21 PROCEDURE — 25000003 PHARM REV CODE 250: Mod: HCNC | Performed by: HOSPITALIST

## 2025-07-21 PROCEDURE — 94761 N-INVAS EAR/PLS OXIMETRY MLT: CPT | Mod: HCNC

## 2025-07-21 PROCEDURE — 63600175 PHARM REV CODE 636 W HCPCS: Mod: HCNC | Performed by: HOSPITALIST

## 2025-07-21 PROCEDURE — 36415 COLL VENOUS BLD VENIPUNCTURE: CPT | Mod: HCNC | Performed by: HOSPITALIST

## 2025-07-21 PROCEDURE — 21400001 HC TELEMETRY ROOM: Mod: HCNC

## 2025-07-21 PROCEDURE — 80048 BASIC METABOLIC PNL TOTAL CA: CPT | Mod: HCNC | Performed by: HOSPITALIST

## 2025-07-21 PROCEDURE — 25000003 PHARM REV CODE 250: Mod: HCNC | Performed by: INTERNAL MEDICINE

## 2025-07-21 PROCEDURE — 83735 ASSAY OF MAGNESIUM: CPT | Mod: HCNC | Performed by: HOSPITALIST

## 2025-07-21 RX ORDER — METOLAZONE 2.5 MG/1
2.5 TABLET ORAL ONCE
Status: COMPLETED | OUTPATIENT
Start: 2025-07-21 | End: 2025-07-21

## 2025-07-21 RX ORDER — FUROSEMIDE 10 MG/ML
120 INJECTION INTRAMUSCULAR; INTRAVENOUS ONCE
Status: COMPLETED | OUTPATIENT
Start: 2025-07-21 | End: 2025-07-21

## 2025-07-21 RX ORDER — DOXYLAMINE SUCCINATE 25 MG
TABLET ORAL 2 TIMES DAILY
Status: DISCONTINUED | OUTPATIENT
Start: 2025-07-21 | End: 2025-07-22 | Stop reason: HOSPADM

## 2025-07-21 RX ADMIN — LIDOCAINE 1 PATCH: 50 PATCH CUTANEOUS at 09:07

## 2025-07-21 RX ADMIN — FUROSEMIDE 120 MG: 10 INJECTION, SOLUTION INTRAVENOUS at 10:07

## 2025-07-21 RX ADMIN — HEPARIN SODIUM 5000 UNITS: 5000 INJECTION INTRAVENOUS; SUBCUTANEOUS at 06:07

## 2025-07-21 RX ADMIN — ACETAMINOPHEN 1000 MG: 500 TABLET ORAL at 02:07

## 2025-07-21 RX ADMIN — MUSCLE RUB CREAM: 100; 150 CREAM TOPICAL at 09:07

## 2025-07-21 RX ADMIN — MICONAZOLE NITRATE: 20 CREAM TOPICAL at 10:07

## 2025-07-21 RX ADMIN — CARBIDOPA AND LEVODOPA 1 SPLIT TABLET: 25; 100 TABLET ORAL at 06:07

## 2025-07-21 RX ADMIN — ACETAMINOPHEN 1000 MG: 500 TABLET ORAL at 08:07

## 2025-07-21 RX ADMIN — PRAVASTATIN SODIUM 40 MG: 20 TABLET ORAL at 09:07

## 2025-07-21 RX ADMIN — HEPARIN SODIUM 5000 UNITS: 5000 INJECTION INTRAVENOUS; SUBCUTANEOUS at 02:07

## 2025-07-21 RX ADMIN — ASPIRIN 81 MG: 81 TABLET, COATED ORAL at 09:07

## 2025-07-21 RX ADMIN — HEPARIN SODIUM 5000 UNITS: 5000 INJECTION INTRAVENOUS; SUBCUTANEOUS at 10:07

## 2025-07-21 RX ADMIN — FUROSEMIDE 120 MG: 10 INJECTION, SOLUTION INTRAVENOUS at 04:07

## 2025-07-21 RX ADMIN — METOLAZONE 2.5 MG: 2.5 TABLET ORAL at 04:07

## 2025-07-21 RX ADMIN — ACETAMINOPHEN 1000 MG: 500 TABLET ORAL at 09:07

## 2025-07-21 RX ADMIN — MUSCLE RUB CREAM: 100; 150 CREAM TOPICAL at 03:07

## 2025-07-21 RX ADMIN — MICONAZOLE NITRATE: 20 CREAM TOPICAL at 09:07

## 2025-07-21 RX ADMIN — DICLOFENAC SODIUM 2 G: 10 GEL TOPICAL at 04:07

## 2025-07-21 NOTE — PROGRESS NOTES
[facility]  Progress Note    Patient Name: Jenniffer Jimenez  MRN: [id number]  Patient Class: IP- Inpatient   Admission Date: 7/17/2025  Length of Stay: 3 days  Attending Physician: Nely Chahal MD  Primary Care Provider: Reed Ruiz MD        Subjective     Principal Problem:Acute on chronic heart failure with preserved ejection fraction        HPI:  Ms. Abad is a 89 year female medical history significant for atrial fibrillation status post Watchman, CKD, hypertension, T2DM, and new tremor disorder who presented to the ER following falls at home.  Of note, patient fell at home about 2 days ago.  She had 2 fall episodes in the span of 2 days. She reports that the tremors have worsened  which caused her to fall. She denies head strikes or bleeding from any orifices. '    She endorses left knee pain.  She is seen at bedside with her daughter.  Patient is extremely hard of hearing.  States her major concern is the frequent falls as well as tremors.  She reports that the tremors have worsened.  She is scheduled with neurology on August 12.      In the ER, she was noted to be hemodynamically stable.  CBC revealed hemoglobin of 9, WBC count of 3.9 and platelet count of 118.  CMP with sodium of 128, potassium of 5.6, chloride of 99, bicarb of 20, BUN of 53, creatinine of 1.9 glucose of 17, magnesium of 2.0. Troponin was negative.  BNP of 273. Urinalysis with trace leukocyte esterase and unremarkable microscopy.   Traumatic evaluation with x-ray of the right humerus, chest, bilateral hips, bilateral knees, CT head, CT cervical spine, CT lumbar spine were all negative.    She received lasix 80 mg and lokelma in the ER.        Overview/Hospital Course:  Presented with progressive upper and lower extremity tremors with multiple falls at home. Neurology and PTOT were consulted. MRI brain w/o acute findings; B1 pending. Neuro started sinemet trial. Patient also found to be in acute CHF and associated  hyponatremia, requiring IV Lasix. DVT u/s ordered for R leg pain - negative.     No new subjective & objective note has been filed under this hospital service since the last note was generated.        Assessment & Plan  Acute on chronic heart failure with preserved ejection fraction  Endorses SOB on exertion.  Elevated BNP above baseline.   On exam: rales, wheezes, BLE pitting edema, JVD  Last echo in 2023 - LVEF of 55% with indeterminate diastolic dysfunction  Continue IV lasix with goal net negative 2-3 L daily  Needs strict I/O's documented - discussed with charge RN  Fluid restriction < 1.2L daily  Ambulatory dysfunction  Falls  Likely related to progressive tremors.  Traumatic evaluation showed no fracture or misalignment.  Neurology consulted  PT/OT evaluation - low intensity. HH, RW, BSC ordered.  Primary hypertension  Patient's blood pressure range in the last 24 hours was: BP  Min: 102/54  Max: 142/62.The patient's inpatient anti-hypertensive regimen is listed below:  Current Antihypertensives  furosemide (LASIX) tablet, ,   furosemide injection 120 mg, Once, Intravenous  Not on any BP meds  Plan  - BP is controlled, no changes needed to their regimen  Chronic a-fib  Patient has long standing persistent (>12 months) atrial fibrillation. Patient is currently in atrial fibrillation. [id number] Score: 3. The patients heart rate in the last 24 hours is as follows:  Pulse  Min: 60  Max: 71     Antiarrhythmics       Anticoagulants  heparin (porcine) injection 5,000 Units, Every 8 hours, Subcutaneous    Plan  - Replete lytes with a goal of K>4, Mg >2  - Patient is not anticoagulated due to watchman's device  - Patient's afib is currently controlled  Hyperkalemia  Hyperkalemia is likely due to Medication induced.The patients most recent potassium results are listed below.  Recent Labs     07/20/25  0228 07/20/25  1047 07/21/25  0323   K 5.8* 5.8* 4.7     Plan  - Monitor for arrhythmias with EKG and/or continuous  telemetry.   - Treat the hyperkalemia with Potassium Binders and Furosemide.   - Monitor potassium: Every 12 hours  - The patient's hyperkalemia is worsening. Will add lokelma  Tremor of unknown origin  Reports worsening tremors initially involving lower extremities and now affecting the upper extremities.  Likely etiology of new falls  Will consult neurology inpatient to evaluate for movement disorder since she has missed prior neurology appt.  Neurology consulted - - MRI brain w/o acute findings, B1 pending. Starting on sinemet trial with prolonged ramp and Neurology f/u  Hyponatremia  Hyponatremia is likely due to Heart Failure. The patient's most recent sodium results are listed below.  Recent Labs     07/19/25  0354 07/20/25  0228 07/21/25  0323   * 130* 131*     Plan  - Correct the sodium by 4-6mEq in 24 hours.   - Obtain the following studies: Urine sodium, urine osmolality, serum osmolality.  - Will treat the hyponatremia -  Fluid restriction of:  1.2 liter per day, diuresis  - Monitor sodium Daily.   - Patient hyponatremia is improving  VTE Risk Mitigation (From admission, onward)           Ordered     heparin (porcine) injection 5,000 Units  Every 8 hours         07/17/25 1818     IP VTE HIGH RISK PATIENT  Once         07/17/25 1818     Place sequential compression device  Until discontinued         07/17/25 1818                    Discharge Planning   GILES: 7/22/2025     Code Status: Full Code   Medical Readiness for Discharge Date:   Discharge Plan A: Home Health   Discharge Delays: None known at this time                    Nely Chahal MD  Department of Hospital Medicine   [facility]

## 2025-07-21 NOTE — ASSESSMENT & PLAN NOTE
Endorses SOB on exertion.  Elevated BNP above baseline.   On exam: rales, wheezes, BLE pitting edema, JVD  Last echo in 2023 - LVEF of 55% with indeterminate diastolic dysfunction  Continue IV lasix with goal net negative 2-3 L daily  Needs strict I/O's documented - discussed with charge RN  Fluid restriction < 1.2L daily

## 2025-07-21 NOTE — PROGRESS NOTES
"[facility]) nurse navigator notified of HFTCC candidate in need of education and introduction to 4-6 week program.      PT aao x 3 while sitting on the bed side commode in room #602 7/21/2025. Introduced self to pt as HFTCC nurse navigator.     Patient given "[facility] Home Care Guide" which includes "Daily weight and symptom tracker".  Encouraged pt to review information.      Reviewed the following key points of HFTCC program with pt and family:    Take your medications as directed. Call our provider if any Health Care Professional changes your heart/fluid medications.    Weight yourself daily. Daily dry weights. Upon waking, empty your bladder, weigh with as little clothes as possible before eating or drinking. Record weight in Symptom Tracker and compare these weights for fluid gain. Weight gain overnight of 3 - 4lbs is fluid, also gain of 5lbs in 3 days is fluid as well. Call us!   Follow low salt and limit fluid diet. Salt/sodium restrictions 2000 - 3000mg. Sodium makes you hold onto fluid. High sodium foods: deli meat, deli cheese, sausages, hot dogs, fast food, restaurant food, anything in a box, bottle, or can. Cook with fresh or frozen ingredients and use seasonings that are labeled NO SALT/SODIUM. Check portions sizes; salt is reported for one portion. A can may contain 2 - 3 portions. Fluid restriction 1.5 - 2 liters per day, measure all your fluid, the milk in your cereal, broth in your soup, yogurt, applesauce, Jello, and ice cream because anything that melts at room temperature is a liquid.    Stop smoking and start exercising. Brisk walking is good; dont walk like youre going to the [employer] and DONT WALK IN THE HEAT! Start slow and increase as tolerated. Remember if you dont use it, you lose it.    Go to all your appointments and call your team. Have your weight, blood pressure and pulse ready when we call to do the phone check-ins " and call us if you have fluid gain or questions.     Pt was given [employer] pamphlet Live more worry less with the CardioMEMS HF System. The pt would like to have late morning appts. The pt will be schedule one week after expected discharge date.      Pt reminded to follow Symptom tracker and to call at the onset of symptoms according to tracker.     Reviewed plan for follow up once discharged to include phone calls, in person and virtual visits to assist pt optimizing their heart failure medication regimen and encouraging healthy lifestyle modifications.  Reminded pt that program will assist them over the next 4-6 weeks and then patient will be transferred to long term care provider .  Reminded pt how to contact HFTCC navigator via phone and or via mycirQlet.     Pt instructed appointment will be printed on hospital discharge paperwork.     Pt also reminded LPN will call 48-72 hours after discharge to check on them.     PT verbalize read back of some of the information given.  Encouraged pt and family to read over information often and contact team with any questions or concerns.

## 2025-07-21 NOTE — ASSESSMENT & PLAN NOTE
Likely related to progressive tremors.  Traumatic evaluation showed no fracture or misalignment.  Neurology consulted  PT/OT evaluation - low intensity. HH, RW, BSC ordered.

## 2025-07-21 NOTE — NURSING
Called to bedside by pt, upon entering the room pt tearful and in hysterics. Reporting that she is upset because her daughter told her that she was over dealing with her due to patient not wanting to follow up with a doctor out of network upon discharge. Pt reports that her daughter is the only family and help that she has, and that she does a lot for her. But that the daughter causes her a lot of stress, in and out of the hospital. Pt was comforted and was able to calm pt down. Told patient that we would get with case management to find out what resources she may have access to. She does not want her daughter to know about this conversation because she doesn't want to upset her. Will reach out to case management tomorrow in regards to this.

## 2025-07-21 NOTE — ASSESSMENT & PLAN NOTE
Patient has long standing persistent (>12 months) atrial fibrillation. Patient is currently in atrial fibrillation. [id number] Score: 3. The patients heart rate in the last 24 hours is as follows:  Pulse  Min: 60  Max: 71     Antiarrhythmics       Anticoagulants  heparin (porcine) injection 5,000 Units, Every 8 hours, Subcutaneous    Plan  - Replete lytes with a goal of K>4, Mg >2  - Patient is not anticoagulated due to watchman's device  - Patient's afib is currently controlled

## 2025-07-21 NOTE — SUBJECTIVE & OBJECTIVE
Interval History:  reporting chronic pain in her right lower extremity. Continues to have shortness of breath. Remains overloaded on exam - continue IV diuresis. Discussed need for strict I/O documentation with charge nurse.     Review of Systems   Constitutional:  Positive for activity change and fatigue. Negative for appetite change, chills, diaphoresis, fever and unexpected weight change.   Respiratory:  Positive for shortness of breath. Negative for apnea, cough, choking, chest tightness, wheezing and stridor.    Cardiovascular:  Positive for leg swelling. Negative for chest pain and palpitations.   Neurological:  Positive for tremors and weakness. Negative for syncope.   All other systems reviewed and are negative.    Objective:     Vital Signs (Most Recent):  Temp: 97.4 °F (36.3 °C) (07/21/25 0742)  Pulse: 64 (07/21/25 0742)  Resp: 16 (07/21/25 0742)  BP: 124/61 (07/21/25 0742)  SpO2: 96 % (07/21/25 0742) Vital Signs (24h Range):  Temp:  [97.4 °F (36.3 °C)-98.2 °F (36.8 °C)] 97.4 °F (36.3 °C)  Pulse:  [60-71] 64  Resp:  [16-18] 16  SpO2:  [94 %-99 %] 96 %  BP: (102-142)/(50-62) 124/61     Weight: 74.8 kg (165 lb)  Body mass index is 27.46 kg/m².    Intake/Output Summary (Last 24 hours) at 7/21/2025 0954  Last data filed at 7/20/2025 1703  Gross per 24 hour   Intake 711 ml   Output 900 ml   Net -189 ml           Physical Exam  Vitals reviewed.   Constitutional:       General: She is not in acute distress.     Appearance: Normal appearance. She is normal weight. She is not ill-appearing, toxic-appearing or diaphoretic.   HENT:      Head: Normocephalic and atraumatic.      Nose: Nose normal.      Mouth/Throat:      Mouth: Mucous membranes are moist.      Pharynx: No oropharyngeal exudate.   Eyes:      General: No scleral icterus.        Right eye: No discharge.         Left eye: No discharge.      Extraocular Movements: Extraocular movements intact.      Conjunctiva/sclera: Conjunctivae normal.   Neck:       Thyroid: No thyromegaly.      Vascular: No JVD.      Trachea: No tracheal deviation.   Cardiovascular:      Comments: JVD  Pulmonary:      Effort: Pulmonary effort is normal. No respiratory distress.      Breath sounds: No wheezing or rales.   Abdominal:      General: Abdomen is flat.   Musculoskeletal:         General: No deformity.      Right lower leg: Edema present.      Left lower leg: Edema present.   Skin:     Coloration: Skin is not pale.   Neurological:      Mental Status: She is alert and oriented to person, place, and time. Mental status is at baseline.      Cranial Nerves: No cranial nerve deficit.      Motor: No abnormal muscle tone.               Significant Labs: All pertinent labs within the past 24 hours have been reviewed.    Significant Imaging: I have reviewed all pertinent imaging results/findings within the past 24 hours.

## 2025-07-21 NOTE — PROGRESS NOTES
[facility] Medicine  Progress Note    Patient Name: Jenniffer Jimenez  MRN: [id number]  Patient Class: IP- Inpatient   Admission Date: 7/17/2025  Length of Stay: 3 days  Attending Physician: Nely Chahal MD  Primary Care Provider: Reed Ruiz MD        Subjective     Principal Problem:Acute on chronic heart failure with preserved ejection fraction        HPI:  Ms. Abad is a 89 year female medical history significant for atrial fibrillation status post Watchman, CKD, hypertension, T2DM, and new tremor disorder who presented to the ER following falls at home.  Of note, patient fell at home about 2 days ago.  She had 2 fall episodes in the span of 2 days. She reports that the tremors have worsened  which caused her to fall. She denies head strikes or bleeding from any orifices. '    She endorses left knee pain.  She is seen at bedside with her daughter.  Patient is extremely hard of hearing.  States her major concern is the frequent falls as well as tremors.  She reports that the tremors have worsened.  She is scheduled with neurology on August 12.      In the ER, she was noted to be hemodynamically stable.  CBC revealed hemoglobin of 9, WBC count of 3.9 and platelet count of 118.  CMP with sodium of 128, potassium of 5.6, chloride of 99, bicarb of 20, BUN of 53, creatinine of 1.9 glucose of 17, magnesium of 2.0. Troponin was negative.  BNP of 273. Urinalysis with trace leukocyte esterase and unremarkable microscopy.   Traumatic evaluation with x-ray of the right humerus, chest, bilateral hips, bilateral knees, CT head, CT cervical spine, CT lumbar spine were all negative.    She received lasix 80 mg and lokelma in the ER.        Overview/Hospital Course:  Presented with progressive upper and lower extremity tremors with multiple falls at home. Neurology and PTOT were consulted. MRI brain w/o acute findings; B1 pending. Neuro started sinemet trial. Patient also found to be in acute CHF and associated  hyponatremia, requiring IV Lasix. DVT u/s ordered for R leg pain - negative.     Interval History:  reporting chronic pain in her right lower extremity. Continues to have shortness of breath. Remains overloaded on exam - continue IV diuresis. Discussed need for strict I/O documentation with charge nurse.     Review of Systems   Constitutional:  Positive for activity change and fatigue. Negative for appetite change, chills, diaphoresis, fever and unexpected weight change.   Respiratory:  Positive for shortness of breath. Negative for apnea, cough, choking, chest tightness, wheezing and stridor.    Cardiovascular:  Positive for leg swelling. Negative for chest pain and palpitations.   Neurological:  Positive for tremors and weakness. Negative for syncope.   All other systems reviewed and are negative.    Objective:     Vital Signs (Most Recent):  Temp: 97.4 °F (36.3 °C) (07/21/25 0742)  Pulse: 64 (07/21/25 0742)  Resp: 16 (07/21/25 0742)  BP: 124/61 (07/21/25 0742)  SpO2: 96 % (07/21/25 0742) Vital Signs (24h Range):  Temp:  [97.4 °F (36.3 °C)-98.2 °F (36.8 °C)] 97.4 °F (36.3 °C)  Pulse:  [60-71] 64  Resp:  [16-18] 16  SpO2:  [94 %-99 %] 96 %  BP: (102-142)/(50-62) 124/61     Weight: 74.8 kg (165 lb)  Body mass index is 27.46 kg/m².    Intake/Output Summary (Last 24 hours) at 7/21/2025 0954  Last data filed at 7/20/2025 1703  Gross per 24 hour   Intake 711 ml   Output 900 ml   Net -189 ml           Physical Exam  Vitals reviewed.   Constitutional:       General: She is not in acute distress.     Appearance: Normal appearance. She is normal weight. She is not ill-appearing, toxic-appearing or diaphoretic.   HENT:      Head: Normocephalic and atraumatic.      Nose: Nose normal.      Mouth/Throat:      Mouth: Mucous membranes are moist.      Pharynx: No oropharyngeal exudate.   Eyes:      General: No scleral icterus.        Right eye: No discharge.         Left eye: No discharge.      Extraocular Movements: Extraocular  movements intact.      Conjunctiva/sclera: Conjunctivae normal.   Neck:      Thyroid: No thyromegaly.      Vascular: No JVD.      Trachea: No tracheal deviation.   Cardiovascular:      Comments: JVD  Pulmonary:      Effort: Pulmonary effort is normal. No respiratory distress.      Breath sounds: No wheezing or rales.   Abdominal:      General: Abdomen is flat.   Musculoskeletal:         General: No deformity.      Right lower leg: Edema present.      Left lower leg: Edema present.   Skin:     Coloration: Skin is not pale.   Neurological:      Mental Status: She is alert and oriented to person, place, and time. Mental status is at baseline.      Cranial Nerves: No cranial nerve deficit.      Motor: No abnormal muscle tone.               Significant Labs: All pertinent labs within the past 24 hours have been reviewed.    Significant Imaging: I have reviewed all pertinent imaging results/findings within the past 24 hours.      Assessment & Plan  Acute on chronic heart failure with preserved ejection fraction  Endorses SOB on exertion.  Elevated BNP above baseline.   On exam: rales, wheezes, BLE pitting edema, JVD  Last echo in 2023 - LVEF of 55% with indeterminate diastolic dysfunction  Continue IV lasix with goal net negative 2-3 L daily  Fluid restriction < 1.2L daily  Ambulatory dysfunction  Falls  Likely related to progressive tremors.  Traumatic evaluation showed no fracture or misalignment.  Neurology consulted  PT/OT evaluation - low intensity, RW, BSC  Primary hypertension  Patient's blood pressure range in the last 24 hours was: BP  Min: 102/54  Max: 142/62.The patient's inpatient anti-hypertensive regimen is listed below:  Current Antihypertensives  furosemide (LASIX) tablet, ,   furosemide injection 120 mg, Once, Intravenous  Not on any BP meds  Plan  - BP is controlled, no changes needed to their regimen  Chronic a-fib  Patient has long standing persistent (>12 months) atrial fibrillation. Patient is  currently in atrial fibrillation. [id number] Score: 3. The patients heart rate in the last 24 hours is as follows:  Pulse  Min: 60  Max: 71     Antiarrhythmics       Anticoagulants  heparin (porcine) injection 5,000 Units, Every 8 hours, Subcutaneous    Plan  - Replete lytes with a goal of K>4, Mg >2  - Patient is not anticoagulated due to watchman's device  - Patient's afib is currently controlled  Hyperkalemia  Hyperkalemia is likely due to Medication induced.The patients most recent potassium results are listed below.  Recent Labs     07/20/25  0228 07/20/25  1047 07/21/25  0323   K 5.8* 5.8* 4.7     Plan  - Monitor for arrhythmias with EKG and/or continuous telemetry.   - Treat the hyperkalemia with Potassium Binders and Furosemide.   - Monitor potassium: Every 12 hours  - The patient's hyperkalemia is worsening. Will add lokelma  Tremor of unknown origin  Reports worsening tremors initially involving lower extremities and now affecting the upper extremities.  Likely etiology of new falls  Will consult neurology inpatient to evaluate for movement disorder since she has missed prior neurology appt.  Neurology consulted - - MRI brain w/o acute findings, B1 pending. Starting on sinemet trial with prolonged ramp and Neurology f/u  Hyponatremia  Hyponatremia is likely due to Heart Failure. The patient's most recent sodium results are listed below.  Recent Labs     07/19/25  0354 07/20/25  0228 07/21/25  0323   * 130* 131*     Plan  - Correct the sodium by 4-6mEq in 24 hours.   - Obtain the following studies: Urine sodium, urine osmolality, serum osmolality.  - Will treat the hyponatremia -  Fluid restriction of:  1.2 liter per day, diuresis  - Monitor sodium Daily.   - Patient hyponatremia is improving  VTE Risk Mitigation (From admission, onward)           Ordered     heparin (porcine) injection 5,000 Units  Every 8 hours         07/17/25 1818     IP VTE HIGH RISK PATIENT  Once         07/17/25 1818     Place  sequential compression device  Until discontinued         07/17/25 1818                    Discharge Planning   GILES: 7/22/2025     Code Status: Full Code   Medical Readiness for Discharge Date:   Discharge Plan A: Home Health   Discharge Delays: None known at this time                    Nely Chahal MD  Department of Hospital Medicine   [facility]

## 2025-07-21 NOTE — ASSESSMENT & PLAN NOTE
Hyponatremia is likely due to Heart Failure. The patient's most recent sodium results are listed below.  Recent Labs     07/19/25  0354 07/20/25  0228 07/21/25  0323   * 130* 131*     Plan  - Correct the sodium by 4-6mEq in 24 hours.   - Obtain the following studies: Urine sodium, urine osmolality, serum osmolality.  - Will treat the hyponatremia -  Fluid restriction of:  1.2 liter per day, diuresis  - Monitor sodium Daily.   - Patient hyponatremia is improving

## 2025-07-21 NOTE — PLAN OF CARE
Patient still reporting pain on BLE, USD of BLE negative for DVT.  Prn Voltaren applied to RLE. Patient reported burning sensation with application of Bengay.     Bed locked and lowered. Call light placed within patient reach.     Problem: Infection  Goal: Absence of Infection Signs and Symptoms  Outcome: Progressing     Problem: Adult Inpatient Plan of Care  Goal: Plan of Care Review  Outcome: Progressing  Goal: Patient-Specific Goal (Individualized)  Outcome: Progressing  Goal: Absence of Hospital-Acquired Illness or Injury  Outcome: Progressing  Goal: Optimal Comfort and Wellbeing  Outcome: Progressing  Goal: Readiness for Transition of Care  Outcome: Progressing     Problem: Skin Injury Risk Increased  Goal: Skin Health and Integrity  Outcome: Progressing     Problem: Wound  Goal: Optimal Coping  Outcome: Progressing  Goal: Optimal Functional Ability  Outcome: Progressing  Goal: Absence of Infection Signs and Symptoms  Outcome: Progressing  Goal: Improved Oral Intake  Outcome: Progressing  Goal: Optimal Pain Control and Function  Outcome: Progressing  Goal: Skin Health and Integrity  Outcome: Progressing  Goal: Optimal Wound Healing  Outcome: Progressing     Problem: Fall Injury Risk  Goal: Absence of Fall and Fall-Related Injury  Outcome: Progressing

## 2025-07-21 NOTE — CONSULTS
Food & Nutrition Education     Diet Education: Fluid and Salt restriction  Time Spent: 10 minutes  Learners: Patient     Handouts provided: Low Sodium Nutrition Therapy, Fluid-Restricted Nutrition Therapy     Comments: Discussed importance of a low sodium diet. Reviewed high sodium foods that should be avoided. Food labels, salt free seasonings, and recommended sodium intake reviewed. Encouraged healthy, fresh foods that are low in sodium that are good for consumption. Fluid intake and conversions discussed. Foods considered fluids were reviewed and encouraged to monitor. General healthy diet encouraged. All questions/concerns were addressed. RD contact provided.     Follow-Up: Yes  Please Re-consult as needed.     Thanks!

## 2025-07-21 NOTE — PLAN OF CARE
07/21/25 1026   Rounds   Attendance ;Assigned nurse  ([facility])   Discharge Plan A Home Health   Why the patient remains in the hospital Requires continued medical care;Other (see comment)  (Pt need more diuresis.)   Transition of Care Barriers None     Jyoti Roy LMSW  Part-Time-  [facility]  Ext. [phone]

## 2025-07-21 NOTE — ASSESSMENT & PLAN NOTE
Hyperkalemia is likely due to Medication induced.The patients most recent potassium results are listed below.  Recent Labs     07/20/25  0228 07/20/25  1047 07/21/25  0323   K 5.8* 5.8* 4.7     Plan  - Monitor for arrhythmias with EKG and/or continuous telemetry.   - Treat the hyperkalemia with Potassium Binders and Furosemide.   - Monitor potassium: Every 12 hours  - The patient's hyperkalemia is worsening. Will add lokelma

## 2025-07-21 NOTE — PLAN OF CARE
[facility]  Discharge Reassessment    Primary Care Provider: Reed Ruiz MD    Expected Discharge Date: 7/22/2025      SW met with patient and daughter (Afshan) regarding regarding d/c plan.  Pt lives at the [facility].  Pt still ambulatory and uses a rollator for mobility.  Pt independent with her ADLS.  Pt has 4 adult children but Afshan lives locally.  Pt reported she receives support from Afshan at home.  SW talked with patient and daughter about additional services at home for sitter services.  Pt's daughter reported they are not interested in sitter services at this time.  SW spoke with patient's family about [facility] program and asked if they would be interested if she qualify and pt and daughter agreed.  SW will also complete a referral for OPCM.  SW also discussed recommendations for  services and patient is agreeable with plan.    Pt accepted by [facility].      If an additional preferred facility not listed above is identified, additional referral to be sent. If above facilities unable to accept, will send additional referrals to in-network providers.     Discharge Plan A and Plan B have been determined by review of patient's clinical status, future medical and therapeutic needs, and coverage/benefits for post-acute care in coordination with multidisciplinary team members.    Reassessment (most recent)       Discharge Reassessment - 07/21/25 1248          Discharge Reassessment    Assessment Type Discharge Planning Reassessment     Did the patient's condition or plan change since previous assessment? No     Discharge Plan discussed with: Patient;Adult children     Communicated GILES with patient/caregiver Yes     Discharge Plan A Home Health     Discharge Plan B Home;Home with family     DME Needed Upon Discharge  none     Transition of Care Barriers None     Why the patient remains in the hospital Requires continued medical care        Post-Acute Status     Post-Acute Authorization Home Health     Home Health Status Referrals Sent     Discharge Delays None known at this time                   Jyoti Roy LMSW  Part-Time-  [facility]  Ext. [phone]

## 2025-07-21 NOTE — ASSESSMENT & PLAN NOTE
Patient has long standing persistent (>12 months) atrial fibrillation. Patient is currently in atrial fibrillation. [id number] Score: 3. The patients heart rate in the last 24 hours is as follows:  Pulse  Min: 60  Max: 71     Antiarrhythmics       Anticoagulants  heparin (porcine) injection 5,000 Units, Every 8 hours, Subcutaneous    Plan  - Replete lytes with a goal of K>4, Mg >2  - Patient is not anticoagulated due to watchman's device  - Patient's afib is currently controlled

## 2025-07-21 NOTE — ASSESSMENT & PLAN NOTE
Patient's blood pressure range in the last 24 hours was: BP  Min: 102/54  Max: 142/62.The patient's inpatient anti-hypertensive regimen is listed below:  Current Antihypertensives  furosemide (LASIX) tablet, ,   furosemide injection 120 mg, Once, Intravenous  Not on any BP meds  Plan  - BP is controlled, no changes needed to their regimen

## 2025-07-22 ENCOUNTER — TELEPHONE (OUTPATIENT)
Dept: NEUROLOGY | Facility: CLINIC | Age: OVER 89
End: 2025-07-22
Payer: MEDICARE

## 2025-07-22 VITALS
HEIGHT: 65 IN | SYSTOLIC BLOOD PRESSURE: 115 MMHG | DIASTOLIC BLOOD PRESSURE: 57 MMHG | OXYGEN SATURATION: 95 % | HEART RATE: 75 BPM | BODY MASS INDEX: 28.91 KG/M2 | TEMPERATURE: 98 F | WEIGHT: 173.5 LBS | RESPIRATION RATE: 18 BRPM

## 2025-07-22 LAB
ANION GAP (OHS): 10 MMOL/L (ref 8–16)
BUN SERPL-MCNC: 68 MG/DL (ref 10–30)
CALCIUM SERPL-MCNC: 8.7 MG/DL (ref 8.7–10.5)
CHLORIDE SERPL-SCNC: 102 MMOL/L (ref 95–110)
CO2 SERPL-SCNC: 21 MMOL/L (ref 23–29)
CREAT SERPL-MCNC: 2.2 MG/DL (ref 0.5–1.4)
GFR SERPLBLD CREATININE-BSD FMLA CKD-EPI: 20 ML/MIN/1.73/M2
GLUCOSE SERPL-MCNC: 93 MG/DL (ref 70–110)
MAGNESIUM SERPL-MCNC: 2 MG/DL (ref 1.6–2.6)
POTASSIUM SERPL-SCNC: 4.8 MMOL/L (ref 3.5–5.1)
SODIUM SERPL-SCNC: 133 MMOL/L (ref 136–145)

## 2025-07-22 PROCEDURE — 25000003 PHARM REV CODE 250: Mod: HCNC | Performed by: HOSPITALIST

## 2025-07-22 PROCEDURE — 83735 ASSAY OF MAGNESIUM: CPT | Mod: HCNC | Performed by: HOSPITALIST

## 2025-07-22 PROCEDURE — 36415 COLL VENOUS BLD VENIPUNCTURE: CPT | Mod: HCNC | Performed by: HOSPITALIST

## 2025-07-22 PROCEDURE — 25000003 PHARM REV CODE 250: Mod: HCNC | Performed by: NURSE PRACTITIONER

## 2025-07-22 PROCEDURE — 80048 BASIC METABOLIC PNL TOTAL CA: CPT | Mod: HCNC | Performed by: HOSPITALIST

## 2025-07-22 PROCEDURE — 63600175 PHARM REV CODE 636 W HCPCS: Mod: HCNC | Performed by: INTERNAL MEDICINE

## 2025-07-22 PROCEDURE — 25000003 PHARM REV CODE 250: Mod: HCNC | Performed by: INTERNAL MEDICINE

## 2025-07-22 RX ORDER — FUROSEMIDE 40 MG/1
TABLET ORAL
Qty: 60 TABLET | Refills: 1 | Status: SHIPPED | OUTPATIENT
Start: 2025-07-23

## 2025-07-22 RX ADMIN — HEPARIN SODIUM 5000 UNITS: 5000 INJECTION INTRAVENOUS; SUBCUTANEOUS at 01:07

## 2025-07-22 RX ADMIN — HEPARIN SODIUM 5000 UNITS: 5000 INJECTION INTRAVENOUS; SUBCUTANEOUS at 06:07

## 2025-07-22 RX ADMIN — ASPIRIN 81 MG: 81 TABLET, COATED ORAL at 08:07

## 2025-07-22 RX ADMIN — PRAVASTATIN SODIUM 40 MG: 20 TABLET ORAL at 08:07

## 2025-07-22 RX ADMIN — MUSCLE RUB CREAM: 100; 150 CREAM TOPICAL at 10:07

## 2025-07-22 RX ADMIN — LIDOCAINE 1 PATCH: 50 PATCH CUTANEOUS at 08:07

## 2025-07-22 RX ADMIN — ACETAMINOPHEN 1000 MG: 500 TABLET ORAL at 08:07

## 2025-07-22 RX ADMIN — MICONAZOLE NITRATE: 20 CREAM TOPICAL at 10:07

## 2025-07-22 RX ADMIN — CARBIDOPA AND LEVODOPA 1 SPLIT TABLET: 25; 100 TABLET ORAL at 07:07

## 2025-07-22 RX ADMIN — ACETAMINOPHEN 1000 MG: 500 TABLET ORAL at 02:07

## 2025-07-22 NOTE — PLAN OF CARE
Problem: Infection  Goal: Absence of Infection Signs and Symptoms  Outcome: Progressing     Problem: Skin Injury Risk Increased  Goal: Skin Health and Integrity  Outcome: Progressing     Problem: Fall Injury Risk  Goal: Absence of Fall and Fall-Related Injury  Outcome: Progressing     Problem: Adult Inpatient Plan of Care  Goal: Plan of Care Review  Outcome: Progressing  Goal: Patient-Specific Goal (Individualized)  Outcome: Progressing  Goal: Absence of Hospital-Acquired Illness or Injury  Outcome: Progressing   Early on in my shift patient was asking for assisting to BSC within a few minutes she was asking to go again  . She would only void a few drops each time. After 3 times in 3 hours of assisting to BSC  and only a few drops in BSC. I explain to patient we may have to try the pure wick. I also went and get the Bladder scan to see if patient is emptying out. Pt was upset and did not want to use the bladder scan. I ask OC Geraldine to come assist me in bladder scanning. Within a few seconds of her trying to perform task Pt demanded that we stopped. Which we, did. A couple of hours later patient  was able to void 100 ml and a little later another 150 . ML. Patient remain calm from about 12mn but woke up around 0400 requesting twice back to back to get up to BSC which PCT Dana assisted,PCT did informed me that she went a little  the second time but did not give me the amount . I will ask her and make sure we document. No fall this shift. Bed alarm on. Call light in reach. Bed in low position. .

## 2025-07-22 NOTE — ASSESSMENT & PLAN NOTE
Patient has long standing persistent (>12 months) atrial fibrillation. Patient is currently in atrial fibrillation. [id number] Score: 3. The patients heart rate in the last 24 hours is as follows:  Pulse  Min: 61  Max: 75     Antiarrhythmics       Anticoagulants  heparin (porcine) injection 5,000 Units, Every 8 hours, Subcutaneous    Plan  - Replete lytes with a goal of K>4, Mg >2  - Patient is not anticoagulated due to watchman's device  - Patient's afib is currently controlled

## 2025-07-22 NOTE — PT/OT/SLP PROGRESS
Physical Therapy      Patient Name:  Jenniffer Jimenez   MRN:  [id number]    Attempt made at 12:41. Patient not seen today secondary to pt eating lunch . Second attempt made at 14:25. Pt D/C.

## 2025-07-22 NOTE — ASSESSMENT & PLAN NOTE
Likely related to progressive tremors.  Traumatic evaluation showed no fracture or misalignment.  Neurology consulted  PT/OT evaluation - low intensity. HH ordered.

## 2025-07-22 NOTE — NURSING
Pt discharged home. IV removed without complication. AVS reviewed with daughter at bedside. Discharge medications delivered via bedside pharmacy. Patient left the floor via wheelchair assisted by her daughter.

## 2025-07-22 NOTE — DISCHARGE INSTRUCTIONS
Our goal at [facility] is to always give you outstanding care and exceptional service. You may receive a survey from [employer] by mail, text or e-mail in the next 24-48 hours asking about the care you received with us. The survey should only take 5-10 minutes to complete and is very important to us.     Your feedback provides us with a way to recognize our staff who work tirelessly to provide the best care! Also, your responses help us learn how to improve when your experience was below our aspiration of excellence. We are always looking for ways to improve your stay. We WILL use your feedback to continue making improvements to help us provide the highest quality care. We keep your personal information and feedback confidential. We appreciate your time completing this survey and can't wait to hear from you!!!    We look forward to your continued care with us! Thanks so much for choosing [facility] for your healthcare needs!

## 2025-07-22 NOTE — PLAN OF CARE
Pt asleep.  MARICRUZ telephoned pt's daughter, Afshan [phone], regarding d/c plan to notify her patient is medically ready for discharge.  SW left a message requesting a return call.  Orders sent to [facility].      1:34 PM  MARICRUZ spoke to pt's daughter Afshan [phone] regarding d/c plan and advised pt is medically ready for d/c.  [facility] will resume  services on Friday.  Also notified referrals for OPCM and Acute Care at Home was placed.  Pt all cleared with case management.      Discharge Plan A and Plan B have been determined by review of patient's clinical status, future medical and therapeutic needs, and coverage/benefits for post-acute care in coordination with multidisciplinary team members.     07/22/25 1249   Post-Acute Status   Post-Acute Authorization Home Health   Home Health Status Set-up Complete/Auth obtained   Discharge Delays None known at this time   Discharge Plan   Discharge Plan A Home Health;Home   Discharge Plan B Home;Home with family     Jyoti Roy LMSW  Part-Time-  [facility]  Ext. [phone]

## 2025-07-22 NOTE — ASSESSMENT & PLAN NOTE
Hyperkalemia is likely due to Medication induced.The patients most recent potassium results are listed below.  Recent Labs     07/20/25  1047 07/21/25  0323 07/22/25  0556   K 5.8* 4.7 4.8     Plan  - Monitor for arrhythmias with EKG and/or continuous telemetry.   - Treat the hyperkalemia with Potassium Binders and Furosemide.   - Monitor potassium: Every 12 hours  - The patient's hyperkalemia is worsening. Will add lokelma

## 2025-07-22 NOTE — TELEPHONE ENCOUNTER
Spoke with the patient's dtrAfshan. Had a lengthy pleasant conversation re: mom's health as it r/t heart, kidney, lungs, fluid overload. Afshan states Mom has CKD and is now fluid restricted and afraid to drink water. Suggested filling pitcher or bottle with volume given and encouraging mom to finish throughout the day.   Offered next available appointment 8/11 with Dr. Crowley. Afshan declined as they already have a scheduled appt with Dr Camacho, made in May.   Verified pt's previously scheduled appt 8/12 at 9;20 am with BLAKE.  Afshan repeated back date/time/location of scheduled appointment.

## 2025-07-22 NOTE — ASSESSMENT & PLAN NOTE
Endorses SOB on exertion.  Elevated BNP above baseline.   On exam: rales, wheezes, BLE pitting edema, JVD  Last echo in 2023 - LVEF of 55% with indeterminate diastolic dysfunction  Rec'vd IV lasix 120 mg daily with goal net negative 2-3 L daily  Fluid restriction 2 L daily

## 2025-07-22 NOTE — ASSESSMENT & PLAN NOTE
Patient's blood pressure range in the last 24 hours was: BP  Min: 103/49  Max: 146/50.The patient's inpatient anti-hypertensive regimen is listed below:  Current Antihypertensives  furosemide (LASIX) tablet, ,   Not on any BP meds  Plan  - BP is controlled, no changes needed to their regimen

## 2025-07-22 NOTE — NURSING
Patient voiding only a few ml's and asking to get on BSCC every few mins. or so. Patient refused bladder scan, and got very upset. Daughter is explaining to her since she is very hard of hearing.

## 2025-07-22 NOTE — DISCHARGE SUMMARY
[facility]  Discharge Summary      Patient Name: Jenniffer Jimenez  MRN: [id number]  NANCY: [id number]  Patient Class: IP- Inpatient  Admission Date: 7/17/2025  Hospital Length of Stay: 5 d  Discharge Date and Time: 07/22/2025 11:03 AM  Attending Physician: Nely Chahal MD   Discharging Provider: Nely Chahal MD  Primary Care Provider: Reed Ruiz MD  [facility] Medicine Team: [facility] Nely Chahal MD  Primary Care Team: [facility]    HPI:   Ms. Abad is a 89 year female medical history significant for atrial fibrillation status post Watchman, CKD, hypertension, T2DM, and new tremor disorder who presented to the ER following falls at home.  Of note, patient fell at home about 2 days ago.  She had 2 fall episodes in the span of 2 days. She reports that the tremors have worsened  which caused her to fall. She denies head strikes or bleeding from any orifices. '    She endorses left knee pain.  She is seen at bedside with her daughter.  Patient is extremely hard of hearing.  States her major concern is the frequent falls as well as tremors.  She reports that the tremors have worsened.  She is scheduled with neurology on August 12.      In the ER, she was noted to be hemodynamically stable.  CBC revealed hemoglobin of 9, WBC count of 3.9 and platelet count of 118.  CMP with sodium of 128, potassium of 5.6, chloride of 99, bicarb of 20, BUN of 53, creatinine of 1.9 glucose of 17, magnesium of 2.0. Troponin was negative.  BNP of 273. Urinalysis with trace leukocyte esterase and unremarkable microscopy.   Traumatic evaluation with x-ray of the right humerus, chest, bilateral hips, bilateral knees, CT head, CT cervical spine, CT lumbar spine were all negative.    She received lasix 80 mg and lokelma in the ER.        * No surgery found *      Hospital Course:   Presented with progressive upper and lower extremity tremors with multiple falls at home. Neurology and PTOT were consulted. MRI  brain w/o acute findings; B1 pending. Neuro started sinemet trial with outpatient ramp and Neurology appt scheduled. Patient also found to be in acute CHF and associated hyponatremia, requiring IV Lasix. Na much improved and now euvolemia. Increase home Lasix to 40 mg daily.  DVT u/s ordered for R leg pain - negative. Has CHF clinic, hospital follow up appts scheduled.      Goals of Care Treatment Preferences:  Code Status: Full Code         Consults:   Consults (From admission, onward)          Status Ordering Provider     Inpatient consult to Registered Dietitian/Nutritionist  Once        Provider:  (Not yet assigned)    Completed ALDEN KINSEY     Inpatient consult to General Neurology  Once        Provider:  (Not yet assigned)    Completed ALDEN KINSEY            Assessment & Plan  Acute on chronic heart failure with preserved ejection fraction  Endorses SOB on exertion.  Elevated BNP above baseline.   On exam: rales, wheezes, BLE pitting edema, JVD  Last echo in 2023 - LVEF of 55% with indeterminate diastolic dysfunction  Rec'vd IV lasix 120 mg daily with goal net negative 2-3 L daily  Fluid restriction 2 L daily  Ambulatory dysfunction  Falls  Likely related to progressive tremors.  Traumatic evaluation showed no fracture or misalignment.  Neurology consulted  PT/OT evaluation - low intensity. HH ordered.  Primary hypertension  Patient's blood pressure range in the last 24 hours was: BP  Min: 103/49  Max: 146/50.The patient's inpatient anti-hypertensive regimen is listed below:  Current Antihypertensives  furosemide (LASIX) tablet, ,   Not on any BP meds  Plan  - BP is controlled, no changes needed to their regimen  Chronic a-fib  Patient has long standing persistent (>12 months) atrial fibrillation. Patient is currently in atrial fibrillation. [id number] Score: 3. The patients heart rate in the last 24 hours is as follows:  Pulse  Min: 61  Max: 75     Antiarrhythmics       Anticoagulants  heparin (porcine)  injection 5,000 Units, Every 8 hours, Subcutaneous    Plan  - Replete lytes with a goal of K>4, Mg >2  - Patient is not anticoagulated due to watchman's device  - Patient's afib is currently controlled  Hyperkalemia  Hyperkalemia is likely due to Medication induced.The patients most recent potassium results are listed below.  Recent Labs     07/20/25  1047 07/21/25  0323 07/22/25  0556   K 5.8* 4.7 4.8     Plan  - Monitor for arrhythmias with EKG and/or continuous telemetry.   - Treat the hyperkalemia with Potassium Binders and Furosemide.   - Monitor potassium: Every 12 hours  - The patient's hyperkalemia is worsening. Will add lokelma  Tremor of unknown origin  Reports worsening tremors initially involving lower extremities and now affecting the upper extremities.  Likely etiology of new falls  Will consult neurology inpatient to evaluate for movement disorder since she has missed prior neurology appt.  Neurology consulted - - MRI brain w/o acute findings, B1 pending. Starting on sinemet trial with prolonged ramp and Neurology f/u  Hyponatremia  Hyponatremia is likely due to Heart Failure. The patient's most recent sodium results are listed below.  Recent Labs     07/20/25  0228 07/21/25  0323 07/22/25  0556   * 131* 133*     Plan  - Correct the sodium by 4-6mEq in 24 hours.   - Obtain the following studies: Urine sodium, urine osmolality, serum osmolality.  - Will treat the hyponatremia -  Fluid restriction of:  1.2 liter per day, diuresis  - Monitor sodium Daily.   - Patient hyponatremia is improving  Final Active Diagnoses:    Diagnosis Date Noted POA    PRINCIPAL PROBLEM:  Acute on chronic heart failure with preserved ejection fraction [I50.33] 04/24/2023 Yes    Tremor of unknown origin [R25.1] 07/17/2025 Yes    Ambulatory dysfunction [R26.2] 07/17/2025 Yes    Falls [R29.6] 07/17/2025 Not Applicable    Hyponatremia [E87.1] 07/17/2025 Yes    Hyperkalemia [E87.5] 01/05/2023 Yes    Chronic a-fib [I48.20]  "01/29/2016 Yes     Chronic    Primary hypertension [I10]  Yes     Chronic      Problems Resolved During this Admission:       Discharged Condition: stable    [place] [facility]    Follow Up:   Contact information for follow-up providers       Reed Ruiz MD. Schedule an appointment as soon as possible for a visit.    Specialty: Internal Medicine  Contact information:  [address] [address]  [phone]               Mayito Crowley DO. Schedule an appointment as soon as possible for a visit.    Specialty: Neurology  Contact information:  [address] [id number] [phone]               Beny George .    Specialty: Internal Medicine  Contact information:  [address]  [address]  [place] [address]                       Contact information for after-discharge care       Home Medical Care       [facility] .    Service: Home Health Services  Contact information:  [address]  [facility] [address]  [phone]                                 Patient Instructions:      COMMODE FOR HOME USE     Order Specific Question Answer Comments   Type: Standard    Height: 5' 5" (1.651 m)    Weight: 74.8 kg (165 lb)    Does patient have medical equipment at home? rollator    Does patient have medical equipment at home? shower chair    Length of need (1-99 months): 99      WALKER FOR HOME USE     Order Specific Question Answer Comments   Type of Walker: Adult (5'4"-6'6")    With wheels? Yes    Height: 5' 5" (1.651 m)    Weight: 74.8 kg (165 lb)    Length of need (1-99 months): 99    Does patient have medical equipment at home? rollator    Does patient have medical equipment at home? shower chair    Please check all that apply: Patient's condition impairs ambulation.    Please check all that apply: Patient is unable to safely ambulate without equipment.      Ambulatory referral/consult to Heart Failure Transitional Care " Clinic   Standing Status: Future   Referral Priority: Routine Referral Type: Consultation   Referral Reason: Specialty Services Required   Requested Specialty: Cardiology   Number of Visits Requested: 1     Ambulatory referral/consult to Cardiology   Standing Status: Future   Referral Priority: Routine Referral Type: Consultation   Referral Reason: Specialty Services Required   Requested Specialty: Cardiology   Number of Visits Requested: 1     Ambulatory referral/consult to Outpatient Case Management   Referral Priority: Routine Referral Type: Consultation   Referral Reason: Specialty Services Required   Number of Visits Requested: 1     Ambulatory referral/consult to [facility] Acute Care at Home   Standing Status: Future   Referral Priority: Routine Referral Type: Consultation   Referred to Provider: VIK PROVIDER Requested Specialty: Internal Medicine   Number of Visits Requested: 1     Diet Cardiac     Notify your health care provider if you experience any of the following:  increased confusion or weakness     Notify your health care provider if you experience any of the following:  persistent dizziness, light-headedness, or visual disturbances     Notify your health care provider if you experience any of the following:  difficulty breathing or increased cough     Activity as tolerated       Significant Diagnostic Studies: N/A    Pending Diagnostic Studies:       Procedure Component Value Units Date/Time    Vitamin B1 [[id number]] Collected: 07/18/25 1739    Order Status: Sent Lab Status: In process Updated: 07/18/25 1754    Specimen: Blood            Medications:  Reconciled Home Medications:      Medication List        START taking these medications      carbidopa-levodopa  mg  mg per tablet  Commonly known as: SINEMET  Week 1: 1/2 tab around breakfast. Week 2: 1/2 tab around breakfast and lunch. Week 3: 1/2 tab around breakfast, lunch and dinner. Week 4: 1 tab around breakfast and 1/2 tab  around lunch and dinner. Week 5: 1 tab around breakfast and lunch and 1/2 tab around dinner. Week 6: 1 tab around breakfast, lunch and dinner.            CHANGE how you take these medications      furosemide 40 MG tablet  Commonly known as: LASIX  Take 40 mg daily for weight 155-159, take 40 mg twice a day for weights 160 over, none for weights less than 155  Start taking on: July 23, 2025  What changed:   medication strength  additional instructions            CONTINUE taking these medications      aspirin 81 MG EC tablet  Commonly known as: ECOTRIN  Take 81 mg by mouth once daily.     estradioL 0.01 % (0.1 mg/gram) vaginal cream  Commonly known as: ESTRACE  Place 1 g vaginally 3 (three) times a week.     pravastatin 40 MG tablet  Commonly known as: PRAVACHOL  Take 1 tablet (40 mg total) by mouth once daily.     psyllium 0.52 gram capsule  Take 3 capsules by mouth 3 (three) times daily as needed (constipation).     spironolactone 25 MG tablet  Commonly known as: ALDACTONE  Take 1 tablet (25 mg total) by mouth 2 (two) times daily.              Indwelling Lines/Drains at time of discharge:   Lines/Drains/Airways       None                       Time spent on the discharge of patient: 45 minutes of time spent on discharge, including examining the patient, providing discharge instructions, arranging follow up, and documentation.            Nely Chahal MD  Department of Hospital Medicine  [facility]

## 2025-07-22 NOTE — PLAN OF CARE
Problem: Infection  Goal: Absence of Infection Signs and Symptoms  Outcome: Met     Problem: Adult Inpatient Plan of Care  Goal: Plan of Care Review  Outcome: Met  Goal: Patient-Specific Goal (Individualized)  Outcome: Met  Goal: Absence of Hospital-Acquired Illness or Injury  Outcome: Met  Goal: Optimal Comfort and Wellbeing  Outcome: Met  Goal: Readiness for Transition of Care  Outcome: Met     Problem: Skin Injury Risk Increased  Goal: Skin Health and Integrity  Outcome: Met     Problem: Wound  Goal: Optimal Coping  Outcome: Met  Goal: Optimal Functional Ability  Outcome: Met  Goal: Absence of Infection Signs and Symptoms  Outcome: Met  Goal: Improved Oral Intake  Outcome: Met  Goal: Optimal Pain Control and Function  Outcome: Met  Goal: Skin Health and Integrity  Outcome: Met  Goal: Optimal Wound Healing  Outcome: Met     Problem: Fall Injury Risk  Goal: Absence of Fall and Fall-Related Injury  Outcome: Met     Problem: Hospitalized Older Adult  Goal: Optimal Cognitive Function  Outcome: Met  Goal: Effective Bowel Elimination  Outcome: Met  Goal: Optimal Coping  Outcome: Met  Goal: Fluid and Electrolyte Balance  Outcome: Met  Goal: Optimal Functional Ability  Outcome: Met  Goal: Improved Oral Intake  Outcome: Met  Goal: Adequate Sleep/Rest  Outcome: Met  Goal: Effective Urinary Elimination  Outcome: Met

## 2025-07-22 NOTE — ASSESSMENT & PLAN NOTE
Hyponatremia is likely due to Heart Failure. The patient's most recent sodium results are listed below.  Recent Labs     07/20/25  0228 07/21/25  0323 07/22/25  0556   * 131* 133*     Plan  - Correct the sodium by 4-6mEq in 24 hours.   - Obtain the following studies: Urine sodium, urine osmolality, serum osmolality.  - Will treat the hyponatremia -  Fluid restriction of:  1.2 liter per day, diuresis  - Monitor sodium Daily.   - Patient hyponatremia is improving

## 2025-07-23 ENCOUNTER — PATIENT OUTREACH (OUTPATIENT)
Dept: ADMINISTRATIVE | Facility: CLINIC | Age: OVER 89
End: 2025-07-23
Payer: MEDICARE

## 2025-07-23 LAB — W VITAMIN B1: 46 UG/L

## 2025-07-23 NOTE — PROGRESS NOTES
C3 nurse spoke with Jenniffer Liam sesar, and patient's daughter, Afshan, for a TCC post hospital discharge follow up call. The patient has a scheduled HOSFU appointment with Deena Daly NP on 7/30/2025 at 10:20 AM.

## 2025-07-24 ENCOUNTER — TELEPHONE (OUTPATIENT)
Dept: INTERNAL MEDICINE | Facility: CLINIC | Age: OVER 89
End: 2025-07-24
Payer: MEDICARE

## 2025-07-24 NOTE — PLAN OF CARE
[facility] - Med Surg  Discharge Final Note    Primary Care Provider: Reed Ruiz MD    Expected Discharge Date: 7/22/2025    Pt discharged home with [facility].  Referrals were also placed for OPCM and Acute Care at home.  Pt's daughter, Afshan, provided transportation home.      Discharge Plan A and Plan B have been determined by review of patient's clinical status, future medical and therapeutic needs, and coverage/benefits for post-acute care in coordination with multidisciplinary team members.    Future Appointments   Date Time Provider Department Center   7/29/2025 10:30 AM LAB, APPOINTMENT [facility] LAB [facility]   7/29/2025 11:00 AM Suyapa Vaughan PA-C [facility]   7/29/2025 11:00 AM [facility] HEART FAILURE NURSE [facility]   7/30/2025 10:20 AM Deena Daly NP [facility]   8/8/2025  9:30 AM Brisa Blul NP [facility]   8/12/2025  9:20 AM Mannie Camacho MD [facility] NEUMOV [facility]   10/15/2025 11:00 AM Maria Pedraza OD [facility]         Final Discharge Note (most recent)       Final Note - 07/23/25 1951          Final Note    Assessment Type Final Discharge Note     Anticipated Discharge Disposition Home-Health Care [facility]     Hospital Resources/Appts/Education Provided Provided patient/caregiver with written discharge plan information        Post-Acute Status    Post-Acute Authorization Home Health     Home Health Status Set-up Complete/Auth obtained     Discharge Delays None known at this time                     Important Message from Medicare              Follow-up providers       Reed Ruiz MD   Specialty: Internal Medicine   Relationship: PCP - General    [address] [address]   Phone: [phone]       Next Steps: Schedule an appointment as soon as possible for a visit    Mayito Crowley DO   Specialty: Neurology    [address] [address]   Phone: [phone]       Next Steps: Schedule an  appointment as soon as possible for a visit    Beny Provider   Specialty: Internal Medicine    [address]  [address]  [place] [place] [address]       Next Steps: Follow up              After-discharge care                Home Medical Care       *[facility]   Service: Home Health Services    [address]  [place] [address]   Phone: [phone]                             Jyoti Roy LMSW  Part-Time-  [facility]  Ext. [phone]

## 2025-07-24 NOTE — TELEPHONE ENCOUNTER
Spoke to patient daughter and we Cx the appointment the June 30 appointment .  They  will see DR Ruiz  on  Aug 5 @ 10 am

## 2025-07-24 NOTE — TELEPHONE ENCOUNTER
Copied from CRM #[id number]. Topic: General Inquiry - Return Call  >> Jul 24, 2025 12:03 PM Efrem wrote:  Patient is returning a phone call.    Who left a message for the patient:  rBisa Romero MA    Does patient know what this is regarding:  return phone call    Would you like a call back, or a response through your [facility] portal?:   call back     Best call back number: [phone]    Comments:

## 2025-07-26 ENCOUNTER — OFFICE VISIT (OUTPATIENT)
Dept: URGENT CARE | Facility: CLINIC | Age: OVER 89
End: 2025-07-26
Payer: MEDICARE

## 2025-07-26 VITALS
DIASTOLIC BLOOD PRESSURE: 64 MMHG | HEIGHT: 65 IN | WEIGHT: 173 LBS | RESPIRATION RATE: 20 BRPM | BODY MASS INDEX: 28.82 KG/M2 | HEART RATE: 81 BPM | SYSTOLIC BLOOD PRESSURE: 129 MMHG | TEMPERATURE: 98 F | OXYGEN SATURATION: 95 %

## 2025-07-26 DIAGNOSIS — N39.0 ACUTE UTI: Primary | ICD-10-CM

## 2025-07-26 DIAGNOSIS — R30.0 DYSURIA: ICD-10-CM

## 2025-07-26 LAB
BILIRUBIN, UA POC OHS: NEGATIVE
BLOOD, UA POC OHS: ABNORMAL
CLARITY, UA POC OHS: ABNORMAL
COLOR, UA POC OHS: YELLOW
GLUCOSE, UA POC OHS: NEGATIVE
KETONES, UA POC OHS: NEGATIVE
LEUKOCYTES, UA POC OHS: ABNORMAL
NITRITE, UA POC OHS: NEGATIVE
PH, UA POC OHS: 6.5
PROTEIN, UA POC OHS: 100
SPECIFIC GRAVITY, UA POC OHS: 1.01
UROBILINOGEN, UA POC OHS: 0.2

## 2025-07-26 PROCEDURE — 81003 URINALYSIS AUTO W/O SCOPE: CPT | Mod: QW,S$GLB,,

## 2025-07-26 PROCEDURE — 99214 OFFICE O/P EST MOD 30 MIN: CPT | Mod: 25,S$GLB,,

## 2025-07-26 PROCEDURE — 96372 THER/PROPH/DIAG INJ SC/IM: CPT | Mod: S$GLB,,, | Performed by: FAMILY MEDICINE

## 2025-07-26 PROCEDURE — 87086 URINE CULTURE/COLONY COUNT: CPT | Mod: HCNC

## 2025-07-26 RX ORDER — CEPHALEXIN 500 MG/1
CAPSULE ORAL
COMMUNITY
Start: 2025-07-24

## 2025-07-26 RX ORDER — CEFTRIAXONE 1 G/1
1 INJECTION, POWDER, FOR SOLUTION INTRAMUSCULAR; INTRAVENOUS
Status: COMPLETED | OUTPATIENT
Start: 2025-07-26 | End: 2025-07-26

## 2025-07-26 RX ORDER — LIDOCAINE HYDROCHLORIDE 10 MG/ML
2.1 INJECTION, SOLUTION INFILTRATION; PERINEURAL
Status: COMPLETED | OUTPATIENT
Start: 2025-07-26 | End: 2025-07-26

## 2025-07-26 RX ADMIN — CEFTRIAXONE 1 G: 1 INJECTION, POWDER, FOR SOLUTION INTRAMUSCULAR; INTRAVENOUS at 11:07

## 2025-07-26 RX ADMIN — LIDOCAINE HYDROCHLORIDE 2.1 ML: 10 INJECTION, SOLUTION INFILTRATION; PERINEURAL at 11:07

## 2025-07-26 NOTE — PROGRESS NOTES
"Subjective:      Patient ID: Jenniffer Jimenez is a 89 y.o. female.    Vitals:  height is 5' 5" (1.651 m) and weight is 78.5 kg (173 lb). Her oral temperature is 98 °F (36.7 °C). Her blood pressure is 129/64 and her pulse is 81. Her respiration is 20 and oxygen saturation is 95%.     Chief Complaint: Dysuria    This is a 89 y.o. female who presents today with a chief complaint of UTI.  Patient complains of pain with urination and frequency.  Patient had a telehealth visit 2 days ago and was prescribed Keflex for her symptoms.  Patient states normally when she gets a UTI she also gets a shot of antibiotics, she thinks that this works better than oral antibiotics alone based on her experience, she is requesting an injection today.  She does state she has her symptoms are improving but have not resolved.  Denies any fever or flank pain.       Dysuria   This is a new problem. The current episode started in the past 7 days. The quality of the pain is described as burning. The pain is at a severity of 6/10. The pain is moderate. She is Not sexually active. There is No history of pyelonephritis. Associated symptoms include frequency and urgency. Pertinent negatives include no flank pain or hematuria. She has tried antibiotics for the symptoms. The treatment provided no relief. Her past medical history is significant for recurrent UTIs.     Constitution: Negative for fever and generalized weakness.   HENT:  Negative for ear pain, sinus pain and sore throat.    Neck: Negative for neck pain.   Cardiovascular:  Negative for chest pain.   Respiratory:  Negative for cough and shortness of breath.    Gastrointestinal:  Negative for abdominal pain.   Genitourinary:  Positive for dysuria, frequency and urgency. Negative for flank pain and hematuria.   Neurological:  Negative for headaches.      Objective:     Physical Exam   Constitutional: She is oriented to person, place, and time. She appears well-developed. She is " cooperative. She does not appear ill.      Comments:Accompanied by daughter.  Very well-appearing, answering questions appropriately     HENT:   Head: Normocephalic and atraumatic.   Ears:   Right Ear: Hearing, tympanic membrane, external ear and ear canal normal.   Left Ear: Hearing, tympanic membrane, external ear and ear canal normal.   Nose: Nose normal. No mucosal edema or nasal deformity. No epistaxis. Right sinus exhibits no maxillary sinus tenderness and no frontal sinus tenderness. Left sinus exhibits no maxillary sinus tenderness and no frontal sinus tenderness.   Mouth/Throat: Uvula is midline, oropharynx is clear and moist and mucous membranes are normal. No trismus in the jaw. Normal dentition. No uvula swelling.   Eyes: Conjunctivae and lids are normal.   Neck: Trachea normal and phonation normal. Neck supple.   Cardiovascular: Normal rate, regular rhythm, normal heart sounds and normal pulses.   Pulmonary/Chest: Effort normal and breath sounds normal.   Abdominal: Normal appearance and bowel sounds are normal. Soft. There is no abdominal tenderness. There is no left CVA tenderness and no right CVA tenderness.   Musculoskeletal: Normal range of motion.         General: Normal range of motion.   Neurological: She is alert and oriented to person, place, and time. She exhibits normal muscle tone.   Skin: Skin is warm, dry and intact.   Psychiatric: Her speech is normal and behavior is normal. Judgment and thought content normal.   Nursing note and vitals reviewed.      Assessment:     1. Acute UTI    2. Dysuria        Plan:   Urinalysis in clinic today does show a UTI due to trace amounts of blood and white blood cells in the urine.  I suspect that as patient is already being treated by Keflex, her infection is improving, however due to her age, frequency of UTIs, and multiple comorbidities I will give her a Rocephin injection in clinic today.  I will also send her urine for culture, as recent culture  results are unable to be viewed in her chart due to being out of the [facility] system.  I discussed with patient and daughter return precautions, as well as supportive care for UTI treatment and UTI prevention.  Instructed patient to continue taking her prescribe Keflex. Patient and daughter acknowledged understanding and we will follow up as discussed    Results for orders placed or performed in visit on 07/26/25   POCT Urinalysis(Instrument)    Collection Time: 07/26/25 11:27 AM   Result Value Ref Range    Color, POC UA Yellow Yellow, Straw, Colorless    Clarity, POC UA Slight Cloudy (A) Clear    Glucose, POC UA Negative Negative    Bilirubin, POC UA Negative Negative    Ketones, POC UA Negative Negative    Spec Grav POC UA 1.015 1.005 - 1.030    Blood, POC UA Trace-intact (A) Negative    pH, POC UA 6.5 5.0 - 8.0    Protein, POC  (A) Negative    Urobilinogen, POC UA 0.2 <=1.0    Nitrite, POC UA Negative Negative    WBC, POC UA Small (A) Negative     *Note: Due to a large number of results and/or encounters for the requested time period, some results have not been displayed. A complete set of results can be found in Results Review.         Acute UTI  -     cefTRIAXone injection 1 g  -     LIDOcaine HCL 10 mg/ml (1%) injection 2.1 mL    Dysuria  -     POCT Urinalysis(Instrument)  -     Urine Culture High Risk ($$)

## 2025-07-26 NOTE — PATIENT INSTRUCTIONS
Please continue to take your antibiotics as prescribed.    May take over-the-counter azo cranberry pills to help prevent future UTIs.    We will call you if the results of your urine culture sure that you need an antibiotic change.    Please follow up with your urologist and primary care doctor.    If you have any worsening symptoms at all please return to urgent care or go to the emergency room.    - You must understand that you have received an Urgent Care treatment only and that you may be released before all of your medical problems are known or treated.   - You, the patient, will arrange for follow up care as instructed.   - If your condition worsens or fails to improve we recommend that you receive another evaluation at the ER immediately or contact your PCP to discuss your concerns or return here.   - Follow up with your PCP or specialty clinic as directed in the next 1-2 weeks if not improved or as needed.  You can call [phone] to schedule an appointment with the appropriate provider.      If your symptoms do not improve or worsen, go to the emergency room immediately.  '

## 2025-07-26 NOTE — PROGRESS NOTES
"Subjective:      Patient ID: Jenniffer Jimenez is a 89 y.o. female.    Vitals:  height is 5' 5" (1.651 m) and weight is 78.5 kg (173 lb). Her respiration is 16.     Chief Complaint: Dysuria    Pt states     Dysuria     Genitourinary:  Positive for dysuria.    Objective:     Physical Exam    Assessment:     1. Dysuria        Plan:       Dysuria  -     POCT Urinalysis(Instrument)                    "

## 2025-07-27 LAB — BACTERIA UR CULT: NO GROWTH

## 2025-07-28 ENCOUNTER — TELEPHONE (OUTPATIENT)
Dept: CARDIOLOGY | Facility: CLINIC | Age: OVER 89
End: 2025-07-28
Payer: MEDICARE

## 2025-07-28 NOTE — TELEPHONE ENCOUNTER
[facility]    Ms. Afshan Duenas called due to having a missed call from our number. I have informed her our MA may have called you to remind you mother of her appt with us on tomorrow 7/29/2025 morning. The pt's daughter has confirmed she and the pt will be there.

## 2025-07-28 NOTE — PROGRESS NOTES
[facility] Provider Note (Initial Clinic) Consult Note    Age: 89 y.o.  Gender: female  Type of Congestive Heart Failure: Diastolic   Etiology: unspecified  Enrolled in Infusion suite: no    Diagnostic Labs:   EKG - 07/18/2025  CXR - 07/17/2025  ECHO - 04/23/2023  Stress test -   Stress echo -   Pharmacologic stress -   Cardiac catheterization -    Cardiac MRI -     Lab Results   Component Value Date     (L) 07/22/2025     (L) 07/21/2025    K 4.8 07/22/2025    K 4.7 07/21/2025     07/22/2025     07/21/2025    CO2 21 (L) 07/22/2025    CO2 20 (L) 07/21/2025    GLU 93 07/22/2025    GLU 97 07/21/2025    BUN 68 (H) 07/22/2025    BUN 60 (H) 07/21/2025    CREATININE 2.2 (H) 07/22/2025    CREATININE 1.8 (H) 07/21/2025    CALCIUM 8.7 07/22/2025    CALCIUM 8.7 07/21/2025    PROT 6.9 07/20/2025    PROT 6.3 07/19/2025    ALBUMIN 4.1 07/20/2025    ALBUMIN 3.9 07/19/2025    BILITOT 0.6 07/20/2025    BILITOT 0.7 07/19/2025    ALKPHOS 99 07/20/2025    ALKPHOS 91 07/19/2025    AST 16 07/20/2025    AST 17 07/19/2025    ALT 16 07/20/2025    ALT 11 07/19/2025    ANIONGAP 10 07/22/2025    ANIONGAP 11 07/21/2025    ESTGFRAFRICA 46.3 (A) 12/23/2021    ESTGFRAFRICA 38.4 (A) 11/11/2021    EGFRNONAA 40.2 (A) 12/23/2021    EGFRNONAA 33.3 (A) 11/11/2021       Lab Results   Component Value Date    WBC 2.94 (L) 07/18/2025    WBC 3.99 07/17/2025    RBC 2.93 (L) 07/18/2025    RBC 3.06 (L) 07/17/2025    HGB 8.7 (L) 07/18/2025    HGB 9.0 (L) 07/17/2025    HCT 25.5 (L) 07/18/2025    HCT 26.7 (L) 07/17/2025    MCV 87 07/18/2025    MCV 87 07/17/2025    MCH 29.7 07/18/2025    MCH 29.4 07/17/2025    MCHC 34.1 07/18/2025    MCHC 33.7 07/17/2025    RDW 16.8 (H) 07/18/2025    RDW 16.7 (H) 07/17/2025     (L) 07/18/2025     (L) 07/17/2025    MPV 10.6 07/18/2025    MPV 10.3 07/17/2025    IMMGR 0.5 07/17/2025    IMMGR 1.4 (H) 05/26/2025    IGABS 0.02 07/17/2025    IGABS 0.06 (H) 05/26/2025    LYMPH 9.3 (L) 07/17/2025    LYMPH  0.37 (L) 07/17/2025    MONO 10.5 07/17/2025    MONO 0.42 07/17/2025    EOS 0.3 07/17/2025    EOS 0.01 07/17/2025    BASO 0.04 08/06/2024    BASO 0.03 07/15/2024    NRBC 0 07/17/2025    NRBC 0 05/26/2025    GRAN 3.5 08/06/2024    GRAN 72.7 08/06/2024    EOSINOPHIL 1.5 08/06/2024    EOSINOPHIL 1.4 07/15/2024    BASOPHIL 0.3 07/17/2025    BASOPHIL 0.01 07/17/2025    PLTEST Appears normal 01/05/2023    PLTEST Appears normal 01/04/2023    ANISO Slight 01/08/2023    ANISO Slight 01/05/2023    HYPO Occasional 01/08/2023    HYPO Occasional 01/05/2023       Lab Results   Component Value Date     (H) 07/17/2025     (H) 05/04/2023    MG 2.0 07/22/2025    MG 2.0 07/21/2025    PHOS 4.0 05/11/2023    PHOS 3.9 05/08/2023    NTPROBNP 1550 (H) 02/11/2021    TROPONINI <0.006 04/21/2023    TROPONINI <0.006 04/07/2023    HGBA1C 5.9 (H) 04/10/2024    HGBA1C 5.9 (H) 04/28/2023    TSH 1.867 07/18/2025    TSH 1.907 05/26/2025       Lab Results   Component Value Date    IRON 51 07/25/2023    TIBC 482 (H) 07/25/2023    FERRITIN 95 07/25/2023    CHOL 158 05/26/2025    TRIG 56 05/26/2025    HDL 62 05/26/2025    LDLCALC 84.8 05/26/2025    CHOLHDL 39.2 05/26/2025    TOTALCHOLEST 2.5 05/26/2025    NONHDLCHOL 96 05/26/2025    COLORU Yellow 07/26/2025    APPEARANCEUA Clear 07/17/2025    PHUR 6.5 07/26/2025    SPECGRAV 1.015 07/26/2025    PROTEINUA Negative 07/17/2025    GLUCUA Negative 07/17/2025    KETONESU Negative 07/26/2025    BILIRUBINUA Negative 07/17/2025    OCCULTUA Negative 07/17/2025    NITRITE Negative 07/26/2025    LEUKOCYTESUR Trace (A) 07/17/2025       Medications Ordered Prior to Encounter[1]      HPI:  Patient reports intermittent SOB with ADL, first noted a couple months ago. Endorses SOB walking to bathroom and back at assisted living facility   Patient sleeps on 3 number of pillows, endorses orthopnea   Patient wakes up SOB, has to get out of bed, associated cough- denies PND symptoms. Endorses both daytime and  nighttime dry cough   Dizzy, light-headed, pre-syncope or syncope- endorses intermittent dizziness/lightheadedness over the past couple months. Denies pre-syncope/syncope   Since discharge frequency of performing weights, home weight and weight change- performing home weights, 162lbs on home scale    Other information felt pertinent to HPI: Ms. Jenniffer Jimenez is a 89 yo female with chronic AF s/p watchman, HFpEF, CKD IV, HTN, T2DM, new tremor disorder who presents to first [facility] visit following recent admission for ADHF after presenting to ED s/p multiple ground level falls. She reports that the tremors have worsened which caused her to fall. In the ER, she was noted to be hemodynamically stable. CMP with sodium of 128, potassium of 5.6, chloride of 99, bicarb of 20, BUN of 53, creatinine of 1.9 glucose of 17, magnesium of 2.0. Troponin was negative. BNP of 273. Urinalysis with trace leukocyte esterase and unremarkable microscopy. Traumatic evaluation with x-ray of the right humerus, chest, bilateral hips, bilateral knees, CT head, CT cervical spine, CT lumbar spine were all negative. Neurology and PTOT were consulted. MRI brain w/o acute findings; B1 pending. Neuro started sinemet trial with outpatient ramp and Neurology appt scheduled. Patient also found to be in acute CHF and associated hyponatremia, requiring IV Lasix with improvement in Na.      PHYSICAL:   Vitals:    07/29/25 1051   BP: (!) 143/63   Pulse: 79      @[id number](3)@    JVD: no   Heart rhythm: regular  Cardiac murmur: No    S3: no  S4: no  Lungs: clear  Hepatojugular reflux: yes  Edema: yes, 1+ BLE edema      Echo 4/22/23:  The left ventricle is normal in size with concentric hypertrophy and normal systolic function.  The estimated ejection fraction is 55%.  Indeterminate left ventricular diastolic function.  With normal right ventricular systolic function.  Severe left atrial enlargement.  Severe right atrial enlargement.  Mild mitral  regurgitation.  Mild to moderate tricuspid regurgitation.  There is pulmonary hypertension.  The estimated PA systolic pressure is 56 mmHg.  Elevated central venous pressure (15 mmHg).       ASSESSMENT/PLAN:    1. Acute heart failure with preserved ejection fraction  -NYHA Class III symptoms. Mild fluid volume on exam today. Pt reports currently taking lasix 20mg BID rather than 40mg daily.   -K 5.7 on labs. Recommend holding spironolactone with repeat labs in next couple days. Take lasix 40mg BID for the next 2 days for mild fluid volume noted on exam, then start lasix 40mg daily dosing. Anticipate K to normalize with prn lasix doses.  -Will get updated TTE to reassess cardiac function given progressive symptoms over the past couple months.  -Recommend 2-3 gram sodium restriction and 1500cc- 2000cc fluid restriction.  -Requested patient to weigh themselves daily, and to notify us if their weight increases by more than 3 lbs in 1 day or 5 lbs in 3 days.  -Will coordinate follow up pending updated TTE     Suyapa Vaughan PA-C       [1]   Current Outpatient Medications on File Prior to Visit   Medication Sig Dispense Refill    aspirin (ECOTRIN) 81 MG EC tablet Take 81 mg by mouth once daily.      carbidopa-levodopa  mg (SINEMET)  mg per tablet Week 1: 1/2 tab around breakfast. Week 2: 1/2 tab around breakfast and lunch. Week 3: 1/2 tab around breakfast, lunch and dinner. Week 4: 1 tab around breakfast and 1/2 tab around lunch and dinner. Week 5: 1 tab around breakfast and lunch and 1/2 tab around dinner. Week 6: 1 tab around breakfast, lunch and dinner. 74 tablet 0    cephALEXin (KEFLEX) 500 MG capsule Take 1 capsule every 8 hours by oral route for 7 days.      estradioL (ESTRACE) 0.01 % (0.1 mg/gram) vaginal cream Place 1 g vaginally 3 (three) times a week. 42.5 g 3    furosemide (LASIX) 40 MG tablet Take 40 mg daily for weight 155-159, take 40 mg twice a day for weights 160 over, none for weights less than  155 60 tablet 1    pravastatin (PRAVACHOL) 40 MG tablet Take 1 tablet (40 mg total) by mouth once daily. 90 tablet 3    psyllium 0.52 gram capsule Take 3 capsules by mouth 3 (three) times daily as needed (constipation).      spironolactone (ALDACTONE) 25 MG tablet Take 1 tablet (25 mg total) by mouth 2 (two) times daily. 180 tablet 3     No current facility-administered medications on file prior to visit.

## 2025-07-29 ENCOUNTER — DOCUMENTATION ONLY (OUTPATIENT)
Dept: CARDIOLOGY | Facility: CLINIC | Age: OVER 89
End: 2025-07-29

## 2025-07-29 ENCOUNTER — TELEPHONE (OUTPATIENT)
Dept: CARDIOLOGY | Facility: CLINIC | Age: OVER 89
End: 2025-07-29

## 2025-07-29 ENCOUNTER — OFFICE VISIT (OUTPATIENT)
Dept: CARDIOLOGY | Facility: CLINIC | Age: OVER 89
End: 2025-07-29
Payer: MEDICARE

## 2025-07-29 ENCOUNTER — LAB VISIT (OUTPATIENT)
Dept: LAB | Facility: HOSPITAL | Age: OVER 89
End: 2025-07-29
Payer: MEDICARE

## 2025-07-29 VITALS
SYSTOLIC BLOOD PRESSURE: 143 MMHG | OXYGEN SATURATION: 95 % | BODY MASS INDEX: 27.97 KG/M2 | HEIGHT: 65 IN | DIASTOLIC BLOOD PRESSURE: 63 MMHG | WEIGHT: 167.88 LBS | HEART RATE: 79 BPM

## 2025-07-29 DIAGNOSIS — R06.02 SOB (SHORTNESS OF BREATH): ICD-10-CM

## 2025-07-29 DIAGNOSIS — I50.31 ACUTE HEART FAILURE WITH PRESERVED EJECTION FRACTION: Chronic | ICD-10-CM

## 2025-07-29 LAB
ABSOLUTE EOSINOPHIL (OHS): 0.02 K/UL
ABSOLUTE MONOCYTE (OHS): 0.5 K/UL (ref 0.3–1)
ABSOLUTE NEUTROPHIL COUNT (OHS): 3.3 K/UL (ref 1.8–7.7)
ALBUMIN SERPL BCP-MCNC: 4.1 G/DL (ref 3.5–5.2)
ALP SERPL-CCNC: 110 UNIT/L (ref 40–150)
ALT SERPL W/O P-5'-P-CCNC: <8 UNIT/L (ref 0–55)
ANION GAP (OHS): 12 MMOL/L (ref 8–16)
AST SERPL-CCNC: 24 UNIT/L (ref 0–50)
BASOPHILS # BLD AUTO: 0.04 K/UL
BASOPHILS NFR BLD AUTO: 0.9 %
BILIRUB SERPL-MCNC: 0.6 MG/DL (ref 0.1–1)
BUN SERPL-MCNC: 78 MG/DL (ref 10–30)
CALCIUM SERPL-MCNC: 9.1 MG/DL (ref 8.7–10.5)
CHLORIDE SERPL-SCNC: 103 MMOL/L (ref 95–110)
CO2 SERPL-SCNC: 22 MMOL/L (ref 23–29)
CREAT SERPL-MCNC: 2 MG/DL (ref 0.5–1.4)
ERYTHROCYTE [DISTWIDTH] IN BLOOD BY AUTOMATED COUNT: 17.1 % (ref 11.5–14.5)
GFR SERPLBLD CREATININE-BSD FMLA CKD-EPI: 23 ML/MIN/1.73/M2
GLUCOSE SERPL-MCNC: 143 MG/DL (ref 70–110)
HCT VFR BLD AUTO: 27.4 % (ref 37–48.5)
HGB BLD-MCNC: 9.3 GM/DL (ref 12–16)
IMM GRANULOCYTES # BLD AUTO: 0.05 K/UL (ref 0–0.04)
IMM GRANULOCYTES NFR BLD AUTO: 1.1 % (ref 0–0.5)
LYMPHOCYTES # BLD AUTO: 0.45 K/UL (ref 1–4.8)
MAGNESIUM SERPL-MCNC: 2.3 MG/DL (ref 1.6–2.6)
MCH RBC QN AUTO: 30.2 PG (ref 27–31)
MCHC RBC AUTO-ENTMCNC: 33.9 G/DL (ref 32–36)
MCV RBC AUTO: 89 FL (ref 82–98)
NT-PROBNP SERPL-MCNC: 3207 PG/ML
NUCLEATED RBC (/100WBC) (OHS): 0 /100 WBC
PHOSPHATE SERPL-MCNC: 5.3 MG/DL (ref 2.7–4.5)
PLATELET # BLD AUTO: 155 K/UL (ref 150–450)
PMV BLD AUTO: 10.9 FL (ref 9.2–12.9)
POTASSIUM SERPL-SCNC: 5.7 MMOL/L (ref 3.5–5.1)
PROT SERPL-MCNC: 6.9 GM/DL (ref 6–8.4)
RBC # BLD AUTO: 3.08 M/UL (ref 4–5.4)
RELATIVE EOSINOPHIL (OHS): 0.5 %
RELATIVE LYMPHOCYTE (OHS): 10.3 % (ref 18–48)
RELATIVE MONOCYTE (OHS): 11.5 % (ref 4–15)
RELATIVE NEUTROPHIL (OHS): 75.7 % (ref 38–73)
SODIUM SERPL-SCNC: 137 MMOL/L (ref 136–145)
WBC # BLD AUTO: 4.36 K/UL (ref 3.9–12.7)

## 2025-07-29 PROCEDURE — 85025 COMPLETE CBC W/AUTO DIFF WBC: CPT | Mod: HCNC

## 2025-07-29 PROCEDURE — 83735 ASSAY OF MAGNESIUM: CPT | Mod: HCNC

## 2025-07-29 PROCEDURE — 99999 PR PBB SHADOW E&M-EST. PATIENT-LVL V: CPT | Mod: PBBFAC,HCNC,,

## 2025-07-29 PROCEDURE — 84075 ASSAY ALKALINE PHOSPHATASE: CPT | Mod: HCNC

## 2025-07-29 PROCEDURE — 83880 ASSAY OF NATRIURETIC PEPTIDE: CPT | Mod: HCNC

## 2025-07-29 PROCEDURE — 84100 ASSAY OF PHOSPHORUS: CPT | Mod: HCNC

## 2025-07-29 PROCEDURE — 36415 COLL VENOUS BLD VENIPUNCTURE: CPT | Mod: HCNC

## 2025-07-29 NOTE — PATIENT INSTRUCTIONS
HOLD spironolactone (aldactone) until instructed to resume. Will coordinate repeat labs through home health later this week to recheck potassium.    Take lasix 40mg twice daily for the next 2 days. On Thursday, resume lasix 40mg daily but in the morning. Will call Thursday to check weight and symptoms.     Will get updated echo to evaluate current heart function.

## 2025-07-30 ENCOUNTER — HOSPITAL ENCOUNTER (OUTPATIENT)
Dept: CARDIOLOGY | Facility: HOSPITAL | Age: OVER 89
Discharge: HOME | End: 2025-07-30
Payer: MEDICARE

## 2025-07-30 VITALS
SYSTOLIC BLOOD PRESSURE: 115 MMHG | DIASTOLIC BLOOD PRESSURE: 70 MMHG | BODY MASS INDEX: 27.82 KG/M2 | HEIGHT: 65 IN | WEIGHT: 167 LBS | HEART RATE: 65 BPM

## 2025-07-30 DIAGNOSIS — I50.31 ACUTE HEART FAILURE WITH PRESERVED EJECTION FRACTION: Chronic | ICD-10-CM

## 2025-07-30 LAB
AORTIC SIZE INDEX (SOV): 1.9 CM/M2
AORTIC SIZE INDEX: 1.7 CM/M2
ASCENDING AORTA: 3.2 CM
AV AREA BY CONTINUOUS VTI: 2.8 CM2
AV INDEX (PROSTH): 0.74
AV LVOT MEAN GRADIENT: 3 MMHG
AV LVOT PEAK GRADIENT: 4 MMHG
AV MEAN GRADIENT: 4 MMHG
AV PEAK GRADIENT: 8 MMHG
AV VALVE AREA BY VELOCITY RATIO: 2.7 CM²
AV VALVE AREA: 2.8 CM2
AV VELOCITY RATIO: 0.71
BSA FOR ECHO PROCEDURE: 1.86 M2
CV ECHO LV RWT: 0.38 CM
DOP CALC AO PEAK VEL: 1.4 M/S
DOP CALC AO VTI: 35 CM
DOP CALC LVOT AREA: 3.8 CM2
DOP CALC LVOT DIAMETER: 2.2 CM
DOP CALC LVOT PEAK VEL: 1 M/S
DOP CALCLVOT PEAK VEL VTI: 25.9 CM
E WAVE DECELERATION TIME: 150 MS
E/A RATIO: 3.19
E/E' RATIO: 13 M/S
ECHO EF ESTIMATED: 56 %
ECHO LV POSTERIOR WALL: 0.9 CM (ref 0.6–1.1)
FRACTIONAL SHORTENING: 29.8 % (ref 28–44)
HR MV ECHO: 65 BPM
INTERVENTRICULAR SEPTUM: 0.9 CM (ref 0.6–1.1)
IVC DIAMETER: 2.22 CM
LA MAJOR: 6.5 CM
LA MINOR: 4.9 CM
LA WIDTH: 3.6 CM
LEFT ATRIUM SIZE: 4.8 CM
LEFT ATRIUM VOLUME INDEX MOD: 34 ML/M2
LEFT ATRIUM VOLUME INDEX: 45 ML/M2
LEFT ATRIUM VOLUME MOD: 63 ML
LEFT ATRIUM VOLUME: 82 CM3
LEFT INTERNAL DIMENSION IN SYSTOLE: 3.3 CM (ref 2.1–4)
LEFT VENTRICLE DIASTOLIC VOLUME INDEX: 56.83 ML/M2
LEFT VENTRICLE DIASTOLIC VOLUME: 104 ML
LEFT VENTRICLE MASS INDEX: 78 G/M2
LEFT VENTRICLE SYSTOLIC VOLUME INDEX: 24.6 ML/M2
LEFT VENTRICLE SYSTOLIC VOLUME: 45 ML
LEFT VENTRICULAR INTERNAL DIMENSION IN DIASTOLE: 4.7 CM (ref 3.5–6)
LEFT VENTRICULAR MASS: 142.7 G
LV LATERAL E/E' RATIO: 10.9
LV SEPTAL E/E' RATIO: 17
Lab: 1.9 CM/M
Lab: 2.1 CM/M
MV A" WAVE DURATION": 133.21 MS
MV MEAN GRADIENT: 3 MMHG
MV PEAK A VEL: 0.48 M/S
MV PEAK E VEL: 1.53 M/S
MV PEAK GRADIENT: 10 MMHG
OHS CV CPX PATIENT HEIGHT IN: 65
OHS CV RV/LV RATIO: 0.7 CM
OHS LV EJECTION FRACTION SIMPSONS BIPLANE MOD: 55 %
PISA TR MAX VEL: 3 M/S
PULM VEIN A" WAVE DURATION": 133.21 MS
PULM VEIN S/D RATIO: 0.97
PULMONIC VEIN PEAK A VELOCITY: 0.4 M/S
PV PEAK D VEL: 0.36 M/S
PV PEAK S VEL: 0.35 M/S
RA MAJOR: 6.22 CM
RA PRESSURE ESTIMATED: 15 MMHG
RA WIDTH: 3.57 CM
RIGHT ATRIAL AREA: 26.9 CM2
RIGHT VENTRICLE DIASTOLIC BASEL DIMENSION: 3.3 CM
RV TB RVSP: 18 MMHG
RV TISSUE DOPPLER FREE WALL SYSTOLIC VELOCITY 1 (APICAL 4 CHAMBER VIEW): 12.42 CM/S
SINUS: 3.5 CM
STJ: 3.6 CM
TDI LATERAL: 0.14 M/S
TDI SEPTAL: 0.09 M/S
TDI: 0.12 M/S
TRICUSPID ANNULAR PLANE SYSTOLIC EXCURSION: 1.5 CM
TV PEAK GRADIENT: 36 MMHG
TV REST PULMONARY ARTERY PRESSURE: 51 MMHG
Z-SCORE OF LEFT VENTRICULAR DIMENSION IN END DIASTOLE: -0.76
Z-SCORE OF LEFT VENTRICULAR DIMENSION IN END SYSTOLE: 0.41

## 2025-07-30 PROCEDURE — 93306 TTE W/DOPPLER COMPLETE: CPT

## 2025-07-30 PROCEDURE — 93306 TTE W/DOPPLER COMPLETE: CPT | Mod: 26,,, | Performed by: INTERNAL MEDICINE

## 2025-07-30 NOTE — TELEPHONE ENCOUNTER
Call to [facility] to verify receipt of Faxed Lab request to have CMP drawn on Thursday 7-31-25 to check PT's K level as it was 5.7 on 7-29-25. Was transferred to Rima who is the  and had to leave a .

## 2025-07-31 ENCOUNTER — TELEPHONE (OUTPATIENT)
Dept: CARDIOLOGY | Facility: CLINIC | Age: OVER 89
End: 2025-07-31
Payer: MEDICARE

## 2025-07-31 ENCOUNTER — LAB REQUISITION (OUTPATIENT)
Dept: LAB | Facility: HOSPITAL | Age: OVER 89
End: 2025-07-31
Payer: MEDICARE

## 2025-07-31 DIAGNOSIS — I13.0 HYPERTENSIVE HEART AND CHRONIC KIDNEY DISEASE WITH HEART FAILURE AND STAGE 1 THROUGH STAGE 4 CHRONIC KIDNEY DISEASE, OR UNSPECIFIED CHRONIC KIDNEY DISEASE: ICD-10-CM

## 2025-07-31 LAB
ALBUMIN SERPL BCP-MCNC: 4.1 G/DL (ref 3.5–5.2)
ALP SERPL-CCNC: 108 UNIT/L (ref 40–150)
ALT SERPL W/O P-5'-P-CCNC: 9 UNIT/L (ref 0–55)
ANION GAP (OHS): 9 MMOL/L (ref 8–16)
AST SERPL-CCNC: 23 UNIT/L (ref 0–50)
BILIRUB SERPL-MCNC: 0.6 MG/DL (ref 0.1–1)
BUN SERPL-MCNC: 66 MG/DL (ref 10–30)
CALCIUM SERPL-MCNC: 9.3 MG/DL (ref 8.7–10.5)
CHLORIDE SERPL-SCNC: 101 MMOL/L (ref 95–110)
CO2 SERPL-SCNC: 25 MMOL/L (ref 23–29)
CREAT SERPL-MCNC: 1.9 MG/DL (ref 0.5–1.4)
GFR SERPLBLD CREATININE-BSD FMLA CKD-EPI: 24 ML/MIN/1.73/M2
GLUCOSE SERPL-MCNC: 111 MG/DL (ref 70–110)
POTASSIUM SERPL-SCNC: 5.6 MMOL/L (ref 3.5–5.1)
PROT SERPL-MCNC: 6.8 GM/DL (ref 6–8.4)
SODIUM SERPL-SCNC: 135 MMOL/L (ref 136–145)

## 2025-07-31 PROCEDURE — 84460 ALANINE AMINO (ALT) (SGPT): CPT | Mod: HCNC

## 2025-07-31 NOTE — TELEPHONE ENCOUNTER
Call with Echo results:    Can we call with Echo results. overall echo looks stable. EF normal, does note diastolic dysfunction, but echo did show increased fluid level. I would like her to continue lasix 40mg BID for now. We're still waiting to get labs back for today but will likely need another set of labs next week with continuing increased lasix dosing.    Nurse Navigator to call [facility] and fax orders in the AM.

## 2025-08-04 ENCOUNTER — LAB REQUISITION (OUTPATIENT)
Dept: LAB | Facility: HOSPITAL | Age: OVER 89
End: 2025-08-04
Payer: MEDICARE

## 2025-08-04 DIAGNOSIS — I13.0 HYPERTENSIVE HEART AND CHRONIC KIDNEY DISEASE WITH HEART FAILURE AND STAGE 1 THROUGH STAGE 4 CHRONIC KIDNEY DISEASE, OR UNSPECIFIED CHRONIC KIDNEY DISEASE: ICD-10-CM

## 2025-08-04 LAB
ALBUMIN SERPL BCP-MCNC: 3.9 G/DL (ref 3.5–5.2)
ALP SERPL-CCNC: 93 UNIT/L (ref 40–150)
ALT SERPL W/O P-5'-P-CCNC: <8 UNIT/L (ref 10–44)
ANION GAP (OHS): 12 MMOL/L (ref 8–16)
AST SERPL-CCNC: 25 UNIT/L (ref 11–45)
BILIRUB SERPL-MCNC: 0.5 MG/DL (ref 0.1–1)
BUN SERPL-MCNC: 62 MG/DL (ref 10–30)
CALCIUM SERPL-MCNC: 8.8 MG/DL (ref 8.7–10.5)
CHLORIDE SERPL-SCNC: 104 MMOL/L (ref 95–110)
CO2 SERPL-SCNC: 20 MMOL/L (ref 23–29)
CREAT SERPL-MCNC: 1.8 MG/DL (ref 0.5–1.4)
GFR SERPLBLD CREATININE-BSD FMLA CKD-EPI: 26 ML/MIN/1.73/M2
GLUCOSE SERPL-MCNC: 80 MG/DL (ref 70–110)
POTASSIUM SERPL-SCNC: 4.4 MMOL/L (ref 3.5–5.1)
PROT SERPL-MCNC: 6.4 GM/DL (ref 6–8.4)
SODIUM SERPL-SCNC: 136 MMOL/L (ref 136–145)

## 2025-08-04 PROCEDURE — 80053 COMPREHEN METABOLIC PANEL: CPT | Mod: HCNC | Performed by: HOSPITALIST

## 2025-08-05 ENCOUNTER — OFFICE VISIT (OUTPATIENT)
Dept: INTERNAL MEDICINE | Facility: CLINIC | Age: OVER 89
End: 2025-08-05
Payer: MEDICARE

## 2025-08-05 ENCOUNTER — LAB VISIT (OUTPATIENT)
Dept: LAB | Facility: HOSPITAL | Age: OVER 89
End: 2025-08-05
Attending: INTERNAL MEDICINE
Payer: MEDICARE

## 2025-08-05 ENCOUNTER — TELEPHONE (OUTPATIENT)
Dept: CARDIOLOGY | Facility: CLINIC | Age: OVER 89
End: 2025-08-05
Payer: MEDICARE

## 2025-08-05 VITALS
DIASTOLIC BLOOD PRESSURE: 70 MMHG | RESPIRATION RATE: 12 BRPM | OXYGEN SATURATION: 94 % | BODY MASS INDEX: 27.13 KG/M2 | TEMPERATURE: 97 F | SYSTOLIC BLOOD PRESSURE: 122 MMHG | HEART RATE: 71 BPM | HEIGHT: 65 IN | WEIGHT: 162.81 LBS

## 2025-08-05 DIAGNOSIS — R06.02 SOB (SHORTNESS OF BREATH): ICD-10-CM

## 2025-08-05 DIAGNOSIS — N30.00 ACUTE CYSTITIS WITHOUT HEMATURIA: ICD-10-CM

## 2025-08-05 DIAGNOSIS — Z09 HOSPITAL DISCHARGE FOLLOW-UP: Primary | ICD-10-CM

## 2025-08-05 DIAGNOSIS — I50.33 ACUTE ON CHRONIC HEART FAILURE WITH PRESERVED EJECTION FRACTION: ICD-10-CM

## 2025-08-05 PROBLEM — I13.10 HYPERTENSIVE HEART AND KIDNEY DISEASE WITHOUT HEART FAILURE AND WITH STAGE 4 CHRONIC KIDNEY DISEASE: Chronic | Status: ACTIVE | Noted: 2024-07-15

## 2025-08-05 PROBLEM — N18.4 HYPERTENSIVE HEART AND KIDNEY DISEASE WITHOUT HEART FAILURE AND WITH STAGE 4 CHRONIC KIDNEY DISEASE: Chronic | Status: ACTIVE | Noted: 2024-07-15

## 2025-08-05 LAB
BACTERIA #/AREA URNS AUTO: ABNORMAL /HPF
BILIRUB UR QL STRIP.AUTO: NEGATIVE
CLARITY UR: CLEAR
COLOR UR AUTO: YELLOW
GLUCOSE UR QL STRIP: NEGATIVE
HGB UR QL STRIP: NEGATIVE
HYALINE CASTS UR QL AUTO: 21 /LPF (ref 0–1)
KETONES UR QL STRIP: NEGATIVE
LEUKOCYTE ESTERASE UR QL STRIP: ABNORMAL
MICROSCOPIC COMMENT: ABNORMAL
NITRITE UR QL STRIP: NEGATIVE
PH UR STRIP: 6 [PH]
PROT UR QL STRIP: ABNORMAL
RBC #/AREA URNS AUTO: 1 /HPF (ref 0–4)
SP GR UR STRIP: 1.01
SQUAMOUS #/AREA URNS AUTO: 2 /HPF
UROBILINOGEN UR STRIP-ACNC: NEGATIVE EU/DL
WBC #/AREA URNS AUTO: 55 /HPF (ref 0–5)
WBC CLUMPS UR QL AUTO: ABNORMAL

## 2025-08-05 PROCEDURE — 81003 URINALYSIS AUTO W/O SCOPE: CPT | Mod: HCNC

## 2025-08-05 PROCEDURE — 99999 PR PBB SHADOW E&M-EST. PATIENT-LVL III: CPT | Mod: PBBFAC,HCNC,, | Performed by: INTERNAL MEDICINE

## 2025-08-05 RX ORDER — DOXYCYCLINE 100 MG/1
100 CAPSULE ORAL 2 TIMES DAILY
Qty: 10 CAPSULE | Refills: 0 | Status: SHIPPED | OUTPATIENT
Start: 2025-08-05 | End: 2025-08-10

## 2025-08-05 NOTE — TELEPHONE ENCOUNTER
"[facility]    Attempted to call pt to complete 1 week "check in" call. Unable to reach pt at listed phone numbers.  Was able to leave a message stating the information needed to complete the weekly call. Was unable to leave contact information.   "

## 2025-08-05 NOTE — PROGRESS NOTES
Assessment:       1. Hospital discharge follow-up    2. Acute on chronic heart failure with preserved ejection fraction    3. SOB (shortness of breath)    4. Acute cystitis without hematuria  - Urine culture; Future  - Urinalysis; Future  - doxycycline (VIBRAMYCIN) 100 MG Cap; Take 1 capsule (100 mg total) by mouth 2 (two) times daily. for 5 days  Dispense: 10 capsule; Refill: 0        Plan:       1/2/3.  Continue checking weights at home.  Call if weight increases by 3 lb in a day or 5 lb in 3 days.  Continue Lasix 40 mg  4. Check urinalysis, urine culture.  Doxycycline 100 mg twice a day for 5 days.    Deep Scribe:  IMPRESSION:  1. Assessed recent hospitalizations and ER visits for hip pain, UTIs, and heart failure exacerbation.  2. Evaluated echo results showing normal heart contraction but impaired relaxation, severely dilated left atrium, aortic valve stenosis, and tricuspid valve regurgitation.  3. Considered pulmonary HTN as a factor in shortness of breath and fluid retention.  4. Restarted Aldactone based on normalized potassium levels.  5. Started Doxycycline 100 mg twice daily for 5 days for presumed ongoing UTI based on persistent symptoms and exam findings.    SUMMARY:   Ordered urine culture and analysis   Restarted Aldactone due to normalized potassium levels   Started Doxycycline 100 mg twice daily for 5 days for presumed UTI   Maintain fluid restriction as previously instructed   Follow up in 1 month    ACUTE ON CHRONIC HEART FAILURE WITH PRESERVED EF:   Explained echo results, including: Heart stiffening with age leading to reduced blood volume pumped.   Consequences of reduced blood pumping, such as swelling in feet, ankles, and lungs.   Left atrium dilation likely due to history of HTN.   Aortic valve stenosis as a normal aging process.   Tricuspid valve regurgitation and its relation to pulmonary HTN.   Discussed the importance of fluid balance in managing heart condition and preventing hospital  readmissions.   Ms. Jimenez to monitor weight daily, recording it each morning.   Ms. Jimenez to call if weight increases by 3 lbs in a day or 5 lbs in 3 days.   Ms. Jimenez to maintain fluid restriction as previously instructed.   Restarted Aldactone based on normalized potassium levels.   Follow up in 1 month.    HOSPITAL DISCHARGE FOLLOW-UP:   Discussed the importance of fluid balance in managing heart condition and preventing hospital readmissions.   Follow up in 1 month.    SOB (SHORTNESS OF BREATH):   Consequences of reduced blood pumping, such as swelling in feet, ankles, and lungs.   Tricuspid valve regurgitation and its relation to pulmonary HTN.    ACUTE CYSTITIS WITHOUT HEMATURIA:   Started Doxycycline 100 mg twice daily for 5 days for presumed ongoing UTI based on persistent symptoms and exam findings.   Ordered urine culture and analysis.                 This note was generated with the assistance of ambient listening technology. Verbal consent was obtained by the patient and accompanying visitor(s) for the recording of patient appointment to facilitate this note. I attest to having reviewed and edited the generated note for accuracy, though some syntax or spelling errors may persist. Please contact the author of this note for any clarification.       Subjective:       Patient ID: Jenniffer Jimenez is a 89 y.o. female.    Chief Complaint: Hospital Follow Up    HPI    89-year-old female here for hospital follow-up.  Discharged 07/22/2025.    Family and/or Caretaker present at visit?  Yes.  Diagnostic tests reviewed/disposition: I have reviewed all completed as well as pending diagnostic tests at the time of discharge.  Disease/illness education: SOB, heart failure exacerbation  Home health/community services discussion/referrals: Patient has home health established at [facility].   Establishment or re-establishment of referral orders for community resources: No other necessary community resources.    Discussion with other health care providers: No discussion with other health care providers necessary.  I reviewed and reconciled the medications from hospital discharge.    History of Present Illness    CHIEF COMPLAINT:  Ms. Jimenez presents today for hospital follow-up after discharge on 07/12/2025.    HISTORY OF PRESENT ILLNESS:  She was recently hospitalized following two falls with knee pain and progressive shortness of breath over 2-3 weeks that significantly impacted her mobility. During hospitalization, she was found to have heart failure exacerbation with 2-3 L of extra fluid and a UTI. While her shortness of breath has improved since hospitalization, she continues to experience some respiratory symptoms. Her weight has decreased from 167 lbs during hospitalization to current average of 162 lbs with daily morning monitoring. She understands to report weight increases of 2-3 lbs in a day or 5 lbs in three days.    RECENT HEALTHCARE ENCOUNTERS:  She had an ER visit at the end of June for hip pain where she was diagnosed with UTI and treated with Keflex 500mg TID, which she completed. CT of lumbar spine showed arthritic changes. She denies ongoing hip pain. One week ago, she visited urgent care for persistent UTI symptoms and received IM Rocephin 1g. She was prescribed Ciprofloxacin but continues to have bladder pressure and urinary symptoms.    CURRENT SYMPTOMS:  She reports difficulty swallowing with occasional food getting stuck requiring back patting. She notes hand tremors and expresses concern about potential Parkinson's disease. She confirms snoring.    MEDICAL HISTORY:  Her history includes pulmonary hypertension and tricuspid valve regurgitation, which contribute to her fluid balance and respiratory issues.    HOME SERVICES:  She currently receives home health services through [facility].      ROS:  ENT: +difficulty swallowing  Respiratory: +shortness of breath, +exertional dyspnea,  +snoring  Genitourinary: +dysuria, +pelvic pressure, +painful urination  Musculoskeletal: +joint pain  Neurological: +tremors         Discharge summary:  HPI:   Ms. Abad is a 89 year female medical history significant for atrial fibrillation status post Watchman, CKD, hypertension, T2DM, and new tremor disorder who presented to the ER following falls at home.  Of note, patient fell at home about 2 days ago.  She had 2 fall episodes in the span of 2 days. She reports that the tremors have worsened  which caused her to fall. She denies head strikes or bleeding from any orifices. '     She endorses left knee pain.  She is seen at bedside with her daughter.  Patient is extremely hard of hearing.  States her major concern is the frequent falls as well as tremors.  She reports that the tremors have worsened.  She is scheduled with neurology on August 12.     In the ER, she was noted to be hemodynamically stable.  CBC revealed hemoglobin of 9, WBC count of 3.9 and platelet count of 118.  CMP with sodium of 128, potassium of 5.6, chloride of 99, bicarb of 20, BUN of 53, creatinine of 1.9 glucose of 17, magnesium of 2.0. Troponin was negative.  BNP of 273. Urinalysis with trace leukocyte esterase and unremarkable microscopy.   Traumatic evaluation with x-ray of the right humerus, chest, bilateral hips, bilateral knees, CT head, CT cervical spine, CT lumbar spine were all negative.     She received lasix 80 mg and lokelma in the ER.           * No surgery found *       Hospital Course:   Presented with progressive upper and lower extremity tremors with multiple falls at home. Neurology and PTOT were consulted. MRI brain w/o acute findings; B1 pending. Neuro started sinemet trial with outpatient ramp and Neurology appt scheduled. Patient also found to be in acute CHF and associated hyponatremia, requiring IV Lasix. Na much improved and now euvolemia. Increase home Lasix to 40 mg daily.  DVT u/s ordered for R leg pain -  negative. Has CHF clinic, hospital follow up appts scheduled.      Acute on chronic heart failure with preserved ejection fraction  Endorses SOB on exertion.  Elevated BNP above baseline.   On exam: rales, wheezes, BLE pitting edema, JVD  Last echo in 2023 - LVEF of 55% with indeterminate diastolic dysfunction  Rec'vd IV lasix 120 mg daily with goal net negative 2-3 L daily  Fluid restriction 2 L daily  Ambulatory dysfunction  Falls  Likely related to progressive tremors.  Traumatic evaluation showed no fracture or misalignment.  Neurology consulted  PT/OT evaluation - low intensity. HH ordered.  Primary hypertension  Patient's blood pressure range in the last 24 hours was: BP  Min: 103/49  Max: 146/50.The patient's inpatient anti-hypertensive regimen is listed below:  Current Antihypertensives  furosemide (LASIX) tablet, ,   Not on any BP meds  Plan  - BP is controlled, no changes needed to their regimen  Chronic a-fib  Patient has long standing persistent (>12 months) atrial fibrillation. Patient is currently in atrial fibrillation. [id number] Score: 3. The patients heart rate in the last 24 hours is as follows:  Pulse  Min: 61  Max: 75      Antiarrhythmics     Anticoagulants  heparin (porcine) injection 5,000 Units, Every 8 hours, Subcutaneous     Plan  - Replete lytes with a goal of K>4, Mg >2  - Patient is not anticoagulated due to watchman's device  - Patient's afib is currently controlled  Hyperkalemia  Hyperkalemia is likely due to Medication induced.The patients most recent potassium results are listed below.        Recent Labs     07/20/25  1047 07/21/25  0323 07/22/25  0556   K 5.8* 4.7 4.8      Plan  - Monitor for arrhythmias with EKG and/or continuous telemetry.   - Treat the hyperkalemia with Potassium Binders and Furosemide.   - Monitor potassium: Every 12 hours  - The patient's hyperkalemia is worsening. Will add lokelma  Tremor of unknown origin  Reports worsening tremors initially involving lower  extremities and now affecting the upper extremities.  Likely etiology of new falls  Will consult neurology inpatient to evaluate for movement disorder since she has missed prior neurology appt.  Neurology consulted - - MRI brain w/o acute findings, B1 pending. Starting on sinemet trial with prolonged ramp and Neurology f/u  Hyponatremia  Hyponatremia is likely due to Heart Failure. The patient's most recent sodium results are listed below.        Recent Labs     07/20/25  0228 07/21/25  0323 07/22/25  0556   * 131* 133*      Plan  - Correct the sodium by 4-6mEq in 24 hours.   - Obtain the following studies: Urine sodium, urine osmolality, serum osmolality.  - Will treat the hyponatremia -  Fluid restriction of:  1.2 liter per day, diuresis  - Monitor sodium Daily.   - Patient hyponatremia is improving         Final Active Diagnoses:     Diagnosis Date Noted POA    PRINCIPAL PROBLEM:  Acute on chronic heart failure with preserved ejection fraction [I50.33] 04/24/2023 Yes    Tremor of unknown origin [R25.1] 07/17/2025 Yes    Ambulatory dysfunction [R26.2] 07/17/2025 Yes    Falls [R29.6] 07/17/2025 Not Applicable    Hyponatremia [E87.1] 07/17/2025 Yes    Hyperkalemia [E87.5] 01/05/2023 Yes    Chronic a-fib [I48.20] 01/29/2016 Yes       Chronic    Primary hypertension [I10]   Yes       Chronic       Problems Resolved During this Admission:         Discharged Condition: stable     Disposition: Home-Health Care [facility]     Follow Up:    Contact information for follow-up providers           Review of Systems          Objective:      Physical Exam  Vitals reviewed.   Constitutional:       Appearance: She is well-developed.   HENT:      Head: Normocephalic and atraumatic.      Mouth/Throat:      Pharynx: No oropharyngeal exudate.   Eyes:      General: No scleral icterus.        Right eye: No discharge.         Left eye: No discharge.      Pupils: Pupils are equal, round, and reactive to light.   Neck:      Thyroid: No  thyromegaly.      Trachea: No tracheal deviation.   Cardiovascular:      Rate and Rhythm: Normal rate and regular rhythm.      Heart sounds: Normal heart sounds. No murmur heard.     No friction rub. No gallop.   Pulmonary:      Effort: Pulmonary effort is normal. No respiratory distress.      Breath sounds: Normal breath sounds. No wheezing or rales.   Chest:      Chest wall: No tenderness.   Abdominal:      General: Bowel sounds are normal. There is no distension.      Palpations: Abdomen is soft. There is no mass.      Tenderness: There is no abdominal tenderness. There is no guarding or rebound.   Musculoskeletal:         General: No tenderness. Normal range of motion.      Cervical back: Normal range of motion and neck supple.   Skin:     General: Skin is warm and dry.      Coloration: Skin is not pale.      Findings: No erythema or rash.   Neurological:      Mental Status: She is alert and oriented to person, place, and time.   Psychiatric:         Behavior: Behavior normal.

## 2025-08-06 ENCOUNTER — TELEPHONE (OUTPATIENT)
Dept: INTERNAL MEDICINE | Facility: CLINIC | Age: OVER 89
End: 2025-08-06
Payer: MEDICARE

## 2025-08-06 NOTE — TELEPHONE ENCOUNTER
Copied from CRM #[id number]. Topic: Appointments - Appointment Access  >> Aug 6, 2025  1:25 PM Isadora wrote:  Type:  Needs Patient RELEASE PAPERS from APPOINTMENT YESTERDAY at 10:00 am    Who Called: Daughter  Additional Information: patient daughter is coming by to  release papers from her Mothers visit.

## 2025-08-07 ENCOUNTER — LAB REQUISITION (OUTPATIENT)
Dept: LAB | Facility: HOSPITAL | Age: OVER 89
End: 2025-08-07
Payer: MEDICARE

## 2025-08-07 ENCOUNTER — TELEPHONE (OUTPATIENT)
Dept: INTERNAL MEDICINE | Facility: CLINIC | Age: OVER 89
End: 2025-08-07
Payer: MEDICARE

## 2025-08-07 DIAGNOSIS — Z78.9 OTHER SPECIFIED HEALTH STATUS: ICD-10-CM

## 2025-08-07 LAB
ALBUMIN SERPL BCP-MCNC: 4 G/DL (ref 3.5–5.2)
ALP SERPL-CCNC: 108 UNIT/L (ref 40–150)
ALT SERPL W/O P-5'-P-CCNC: <8 UNIT/L (ref 0–55)
ANION GAP (OHS): 10 MMOL/L (ref 8–16)
AST SERPL-CCNC: 26 UNIT/L (ref 0–50)
BILIRUB SERPL-MCNC: 0.7 MG/DL (ref 0.1–1)
BUN SERPL-MCNC: 62 MG/DL (ref 10–30)
CALCIUM SERPL-MCNC: 9.1 MG/DL (ref 8.7–10.5)
CHLORIDE SERPL-SCNC: 105 MMOL/L (ref 95–110)
CO2 SERPL-SCNC: 20 MMOL/L (ref 23–29)
CREAT SERPL-MCNC: 1.7 MG/DL (ref 0.5–1.4)
GFR SERPLBLD CREATININE-BSD FMLA CKD-EPI: 28 ML/MIN/1.73/M2
GLUCOSE SERPL-MCNC: 91 MG/DL (ref 70–110)
POTASSIUM SERPL-SCNC: 5.2 MMOL/L (ref 3.5–5.1)
PROT SERPL-MCNC: 6.7 GM/DL (ref 6–8.4)
SODIUM SERPL-SCNC: 135 MMOL/L (ref 136–145)

## 2025-08-07 PROCEDURE — 82040 ASSAY OF SERUM ALBUMIN: CPT | Mod: HCNC

## 2025-08-07 NOTE — TELEPHONE ENCOUNTER
Spoke to patient's daughter  and she wanted the AVS  printed for her and home health nurse . AVS printed and left at the desk for patient's daughter to pickup

## 2025-08-12 ENCOUNTER — OFFICE VISIT (OUTPATIENT)
Facility: CLINIC | Age: OVER 89
End: 2025-08-12
Payer: MEDICARE

## 2025-08-12 VITALS
HEIGHT: 65 IN | WEIGHT: 162 LBS | DIASTOLIC BLOOD PRESSURE: 62 MMHG | SYSTOLIC BLOOD PRESSURE: 123 MMHG | BODY MASS INDEX: 26.99 KG/M2 | HEART RATE: 65 BPM

## 2025-08-12 DIAGNOSIS — G95.9 CERVICAL MYELOPATHY: Primary | ICD-10-CM

## 2025-08-12 DIAGNOSIS — M54.16 LUMBAR RADICULOPATHY: ICD-10-CM

## 2025-08-12 PROCEDURE — 99999 PR PBB SHADOW E&M-EST. PATIENT-LVL III: CPT | Mod: PBBFAC,HCNC,, | Performed by: PSYCHIATRY & NEUROLOGY

## 2025-08-18 ENCOUNTER — TELEPHONE (OUTPATIENT)
Dept: PHARMACY | Facility: CLINIC | Age: OVER 89
End: 2025-08-18
Payer: MEDICARE

## 2025-08-23 ENCOUNTER — HOSPITAL ENCOUNTER (OUTPATIENT)
Dept: RADIOLOGY | Facility: HOSPITAL | Age: OVER 89
Discharge: HOME | End: 2025-08-23
Attending: PSYCHIATRY & NEUROLOGY
Payer: MEDICARE

## 2025-08-23 ENCOUNTER — NURSE TRIAGE (OUTPATIENT)
Dept: ADMINISTRATIVE | Facility: CLINIC | Age: OVER 89
End: 2025-08-23
Payer: MEDICARE

## 2025-08-23 DIAGNOSIS — G95.9 CERVICAL MYELOPATHY: ICD-10-CM

## 2025-08-23 DIAGNOSIS — M54.16 LUMBAR RADICULOPATHY: ICD-10-CM

## 2025-08-25 ENCOUNTER — DOCUMENTATION ONLY (OUTPATIENT)
Dept: CARDIOLOGY | Facility: CLINIC | Age: OVER 89
End: 2025-08-25
Payer: MEDICARE

## 2025-08-28 ENCOUNTER — PATIENT MESSAGE (OUTPATIENT)
Facility: CLINIC | Age: OVER 89
End: 2025-08-28
Payer: MEDICARE

## 2025-08-28 DIAGNOSIS — G95.9 MYELOPATHY: Primary | ICD-10-CM

## 2025-08-28 RX ORDER — ALPRAZOLAM 0.5 MG/1
0.5 TABLET ORAL 3 TIMES DAILY PRN
Qty: 90 TABLET | Refills: 0 | Status: SHIPPED | OUTPATIENT
Start: 2025-08-28

## 2025-08-29 ENCOUNTER — TELEPHONE (OUTPATIENT)
Facility: CLINIC | Age: OVER 89
End: 2025-08-29
Payer: MEDICARE

## 2025-08-29 ENCOUNTER — OUTPATIENT CASE MANAGEMENT (OUTPATIENT)
Dept: ADMINISTRATIVE | Facility: OTHER | Age: OVER 89
End: 2025-08-29
Payer: MEDICARE

## 2025-08-30 ENCOUNTER — HOSPITAL ENCOUNTER (OUTPATIENT)
Dept: RADIOLOGY | Facility: HOSPITAL | Age: OVER 89
Discharge: HOME | End: 2025-08-30
Attending: PSYCHIATRY & NEUROLOGY
Payer: MEDICARE

## 2025-08-30 DIAGNOSIS — G95.9 CERVICAL MYELOPATHY: ICD-10-CM

## 2025-08-30 PROCEDURE — 72148 MRI LUMBAR SPINE W/O DYE: CPT | Mod: TC,HCNC

## 2025-08-30 PROCEDURE — 72141 MRI NECK SPINE W/O DYE: CPT | Mod: 26,HCNC,, | Performed by: RADIOLOGY

## 2025-08-30 PROCEDURE — 72148 MRI LUMBAR SPINE W/O DYE: CPT | Mod: 26,HCNC,, | Performed by: RADIOLOGY

## 2025-08-30 PROCEDURE — 72141 MRI NECK SPINE W/O DYE: CPT | Mod: TC,HCNC

## 2025-09-02 ENCOUNTER — TELEPHONE (OUTPATIENT)
Dept: NEUROSURGERY | Facility: CLINIC | Age: OVER 89
End: 2025-09-02
Payer: MEDICARE

## (undated) DEVICE — ELECTRODE REM PLYHSV RETURN 9

## (undated) DEVICE — DRESSING TELFA STRL 4X3 LF

## (undated) DEVICE — GLOVE SURG BIOGEL LATEX SZ 7.5

## (undated) DEVICE — BANDAGE ACE NON LATEX 3IN

## (undated) DEVICE — PROTECTION STATION PLUS

## (undated) DEVICE — CATH SUPER TORQUE MP 4FRX80CM

## (undated) DEVICE — WIRE BENTSON 035/180

## (undated) DEVICE — PACK BASIC

## (undated) DEVICE — PAD PREP CUFFED NS 24X48IN

## (undated) DEVICE — OMNIPAQUE 350 200ML

## (undated) DEVICE — BLADE SCALP OPHTL BEVEL STR

## (undated) DEVICE — KIT CUSTOM MANIFOLD

## (undated) DEVICE — SEE L#120831

## (undated) DEVICE — SHEATH INTRODUCER 8FR 11CM

## (undated) DEVICE — GUIDEWIRE TORAY INOUE

## (undated) DEVICE — PACK SURGERY START

## (undated) DEVICE — KIT MICROINTRO 4F .018X40X7CM

## (undated) DEVICE — DEVICE PERCLOSE SUT CLSR 6FR

## (undated) DEVICE — GUIDEWIRE AMPLATZ .035X260

## (undated) DEVICE — SOL 9P NACL IRR PIC IL

## (undated) DEVICE — SEE MEDLINE ITEM 156894

## (undated) DEVICE — APPLICATOR CHLORAPREP ORN 26ML

## (undated) DEVICE — SEE MEDLINE ITEM 157117

## (undated) DEVICE — PACKING STRIP IDOFORM 1X5YD

## (undated) DEVICE — SUT MCRYL PLUS 4-0 PS2 27IN

## (undated) DEVICE — DIALATOR COONS TAPER 12F 20CM

## (undated) DEVICE — SPIKE CONTRAST CONTROLLER

## (undated) DEVICE — CATH DXTERITY PG145 110CM 6FR

## (undated) DEVICE — GOWN SURGICAL X-LARGE

## (undated) DEVICE — SHEATH 8FR MULLINS TRANS

## (undated) DEVICE — GOWN POLY REINF BRTH SLV LG

## (undated) DEVICE — GOWN POLY REINF BRTH SLV XL

## (undated) DEVICE — GUIDEWIRE AMPLATZ STF .032X260

## (undated) DEVICE — TRAY MINOR GEN SURG

## (undated) DEVICE — NDL TRANSEPTAL ADULT 71.0

## (undated) DEVICE — SYS WATCHMAN TRUSEAL ANT CURVE

## (undated) DEVICE — DRESSING XEROFORM 1X8IN

## (undated) DEVICE — DRESSING ADH ISLAND 3.6 X 14

## (undated) DEVICE — ADHESIVE MASTISOL VIAL 48/BX

## (undated) DEVICE — DRESSING TRANS 4X4 3/4

## (undated) DEVICE — DRESSING ABSRBNT ISLAND 3.6X8

## (undated) DEVICE — DRAPE INCISE IOBAN 2 23X17IN

## (undated) DEVICE — CLOSURE SKIN STERI STRIP 1/2X4

## (undated) DEVICE — DRESSING XEROFORM NONADH 1X8IN

## (undated) DEVICE — SUT VICRYL PLUS 3-0 SH 18IN

## (undated) DEVICE — LEFT ATRIAL APPENDAGE CLOSURE DEVICE WITH DELIVERY SYSTEM
Type: IMPLANTABLE DEVICE | Site: HEART | Status: NON-FUNCTIONAL
Brand: WATCHMAN FLX™

## (undated) DEVICE — SEE MEDLINE ITEM 157148

## (undated) DEVICE — DRESSING TRANS 4X4 TEGADERM